# Patient Record
Sex: MALE | Race: BLACK OR AFRICAN AMERICAN | NOT HISPANIC OR LATINO | Employment: OTHER | URBAN - METROPOLITAN AREA
[De-identification: names, ages, dates, MRNs, and addresses within clinical notes are randomized per-mention and may not be internally consistent; named-entity substitution may affect disease eponyms.]

---

## 2015-01-15 LAB — HIV1/HIV2 ANTIBODY: NON REACTIVE

## 2017-03-21 ENCOUNTER — LAB VISIT (OUTPATIENT)
Dept: LAB | Facility: HOSPITAL | Age: 62
End: 2017-03-21
Attending: INTERNAL MEDICINE
Payer: COMMERCIAL

## 2017-03-21 ENCOUNTER — OFFICE VISIT (OUTPATIENT)
Dept: FAMILY MEDICINE | Facility: CLINIC | Age: 62
End: 2017-03-21
Payer: COMMERCIAL

## 2017-03-21 VITALS
SYSTOLIC BLOOD PRESSURE: 100 MMHG | TEMPERATURE: 98 F | BODY MASS INDEX: 29.55 KG/M2 | HEART RATE: 76 BPM | DIASTOLIC BLOOD PRESSURE: 80 MMHG | OXYGEN SATURATION: 99 % | WEIGHT: 195 LBS | HEIGHT: 68 IN

## 2017-03-21 DIAGNOSIS — I69.30 HISTORY OF STROKE WITH RESIDUAL EFFECTS: ICD-10-CM

## 2017-03-21 DIAGNOSIS — R71.8 MICROCYTOSIS: ICD-10-CM

## 2017-03-21 DIAGNOSIS — R60.0 PERIPHERAL EDEMA: ICD-10-CM

## 2017-03-21 DIAGNOSIS — R35.89 POLYURIA: ICD-10-CM

## 2017-03-21 DIAGNOSIS — Z86.73 HISTORY OF STROKE: Primary | ICD-10-CM

## 2017-03-21 DIAGNOSIS — Z79.4 CONTROLLED TYPE 2 DIABETES MELLITUS WITHOUT COMPLICATION, WITH LONG-TERM CURRENT USE OF INSULIN: ICD-10-CM

## 2017-03-21 DIAGNOSIS — I69.398 SEIZURE DISORDER AS SEQUELA OF CEREBROVASCULAR ACCIDENT: ICD-10-CM

## 2017-03-21 DIAGNOSIS — Z51.81 THERAPEUTIC DRUG MONITORING: ICD-10-CM

## 2017-03-21 DIAGNOSIS — N40.0 BENIGN NON-NODULAR PROSTATIC HYPERPLASIA WITHOUT LOWER URINARY TRACT SYMPTOMS: ICD-10-CM

## 2017-03-21 DIAGNOSIS — E78.5 HYPERLIPIDEMIA, UNSPECIFIED HYPERLIPIDEMIA TYPE: ICD-10-CM

## 2017-03-21 DIAGNOSIS — N18.30 CKD STAGE 3 SECONDARY TO DIABETES: ICD-10-CM

## 2017-03-21 DIAGNOSIS — I10 ESSENTIAL HYPERTENSION: ICD-10-CM

## 2017-03-21 DIAGNOSIS — E11.9 CONTROLLED TYPE 2 DIABETES MELLITUS WITHOUT COMPLICATION, WITH LONG-TERM CURRENT USE OF INSULIN: ICD-10-CM

## 2017-03-21 DIAGNOSIS — E11.22 CKD STAGE 3 SECONDARY TO DIABETES: ICD-10-CM

## 2017-03-21 DIAGNOSIS — G40.909 SEIZURE DISORDER AS SEQUELA OF CEREBROVASCULAR ACCIDENT: ICD-10-CM

## 2017-03-21 DIAGNOSIS — I69.351 HEMIPLEGIA AND HEMIPARESIS FOLLOWING CEREBRAL INFARCTION AFFECTING RIGHT DOMINANT SIDE: ICD-10-CM

## 2017-03-21 LAB
ALBUMIN SERPL BCP-MCNC: 3.6 G/DL
ALP SERPL-CCNC: 74 U/L
ALT SERPL W/O P-5'-P-CCNC: 17 U/L
ANION GAP SERPL CALC-SCNC: 10 MMOL/L
AST SERPL-CCNC: 17 U/L
BASOPHILS # BLD AUTO: 0.02 K/UL
BASOPHILS NFR BLD: 0.5 %
BILIRUB SERPL-MCNC: 0.4 MG/DL
BUN SERPL-MCNC: 32 MG/DL
CALCIUM SERPL-MCNC: 8.8 MG/DL
CHLORIDE SERPL-SCNC: 106 MMOL/L
CO2 SERPL-SCNC: 23 MMOL/L
CREAT SERPL-MCNC: 1.6 MG/DL
DIFFERENTIAL METHOD: ABNORMAL
EOSINOPHIL # BLD AUTO: 0 K/UL
EOSINOPHIL NFR BLD: 1.1 %
ERYTHROCYTE [DISTWIDTH] IN BLOOD BY AUTOMATED COUNT: 13.7 %
EST. GFR  (AFRICAN AMERICAN): 52.6 ML/MIN/1.73 M^2
EST. GFR  (NON AFRICAN AMERICAN): 45.5 ML/MIN/1.73 M^2
GLUCOSE SERPL-MCNC: 84 MG/DL
HCT VFR BLD AUTO: 39.9 %
HGB BLD-MCNC: 14 G/DL
LYMPHOCYTES # BLD AUTO: 1.1 K/UL
LYMPHOCYTES NFR BLD: 29.8 %
MCH RBC QN AUTO: 26.4 PG
MCHC RBC AUTO-ENTMCNC: 35.1 %
MCV RBC AUTO: 75 FL
MONOCYTES # BLD AUTO: 0.3 K/UL
MONOCYTES NFR BLD: 8.2 %
NEUTROPHILS # BLD AUTO: 2.3 K/UL
NEUTROPHILS NFR BLD: 60.4 %
PHENYTOIN SERPL-MCNC: 10 UG/ML
PLATELET # BLD AUTO: 204 K/UL
PMV BLD AUTO: 9.6 FL
POTASSIUM SERPL-SCNC: 4.7 MMOL/L
PROT SERPL-MCNC: 6.9 G/DL
RBC # BLD AUTO: 5.31 M/UL
SODIUM SERPL-SCNC: 139 MMOL/L
WBC # BLD AUTO: 3.79 K/UL

## 2017-03-21 PROCEDURE — 3074F SYST BP LT 130 MM HG: CPT | Mod: S$GLB,,, | Performed by: INTERNAL MEDICINE

## 2017-03-21 PROCEDURE — 36415 COLL VENOUS BLD VENIPUNCTURE: CPT | Mod: PO

## 2017-03-21 PROCEDURE — 80053 COMPREHEN METABOLIC PANEL: CPT

## 2017-03-21 PROCEDURE — 83036 HEMOGLOBIN GLYCOSYLATED A1C: CPT

## 2017-03-21 PROCEDURE — 3046F HEMOGLOBIN A1C LEVEL >9.0%: CPT | Mod: S$GLB,,, | Performed by: INTERNAL MEDICINE

## 2017-03-21 PROCEDURE — 85025 COMPLETE CBC W/AUTO DIFF WBC: CPT

## 2017-03-21 PROCEDURE — 80185 ASSAY OF PHENYTOIN TOTAL: CPT

## 2017-03-21 PROCEDURE — 1160F RVW MEDS BY RX/DR IN RCRD: CPT | Mod: S$GLB,,, | Performed by: INTERNAL MEDICINE

## 2017-03-21 PROCEDURE — 4010F ACE/ARB THERAPY RXD/TAKEN: CPT | Mod: S$GLB,,, | Performed by: INTERNAL MEDICINE

## 2017-03-21 PROCEDURE — 99204 OFFICE O/P NEW MOD 45 MIN: CPT | Mod: S$GLB,,, | Performed by: INTERNAL MEDICINE

## 2017-03-21 PROCEDURE — 3079F DIAST BP 80-89 MM HG: CPT | Mod: S$GLB,,, | Performed by: INTERNAL MEDICINE

## 2017-03-21 PROCEDURE — 99999 PR PBB SHADOW E&M-NEW PATIENT-LVL III: CPT | Mod: PBBFAC,,, | Performed by: INTERNAL MEDICINE

## 2017-03-21 PROCEDURE — 2022F DILAT RTA XM EVC RTNOPTHY: CPT | Mod: S$GLB,,, | Performed by: INTERNAL MEDICINE

## 2017-03-21 RX ORDER — PHENYTOIN SODIUM 100 MG/1
100 CAPSULE, EXTENDED RELEASE ORAL 3 TIMES DAILY
Qty: 90 CAPSULE | Refills: 5 | Status: SHIPPED | OUTPATIENT
Start: 2017-03-21 | End: 2017-10-10 | Stop reason: SDUPTHER

## 2017-03-21 RX ORDER — TAMSULOSIN HYDROCHLORIDE 0.4 MG/1
0.4 CAPSULE ORAL DAILY
COMMUNITY
End: 2017-03-21 | Stop reason: SDUPTHER

## 2017-03-21 RX ORDER — CLOPIDOGREL BISULFATE 75 MG/1
75 TABLET ORAL DAILY
COMMUNITY
End: 2017-03-21 | Stop reason: SDUPTHER

## 2017-03-21 RX ORDER — CLOPIDOGREL BISULFATE 75 MG/1
75 TABLET ORAL DAILY
Qty: 30 TABLET | Refills: 5 | Status: SHIPPED | OUTPATIENT
Start: 2017-03-21 | End: 2017-09-28 | Stop reason: SDUPTHER

## 2017-03-21 RX ORDER — FUROSEMIDE 40 MG/1
40 TABLET ORAL DAILY
COMMUNITY
End: 2017-03-21

## 2017-03-21 RX ORDER — BENAZEPRIL HYDROCHLORIDE 10 MG/1
10 TABLET ORAL DAILY
COMMUNITY
End: 2017-03-21 | Stop reason: SDUPTHER

## 2017-03-21 RX ORDER — BENAZEPRIL HYDROCHLORIDE 10 MG/1
10 TABLET ORAL DAILY
Qty: 30 TABLET | Refills: 5 | Status: SHIPPED | OUTPATIENT
Start: 2017-03-21 | End: 2017-05-30 | Stop reason: SDUPTHER

## 2017-03-21 RX ORDER — FUROSEMIDE 20 MG/1
20 TABLET ORAL DAILY
Qty: 30 TABLET | Refills: 5 | Status: SHIPPED | OUTPATIENT
Start: 2017-03-21 | End: 2018-05-11 | Stop reason: SDUPTHER

## 2017-03-21 RX ORDER — BACLOFEN 10 MG/1
10 TABLET ORAL 4 TIMES DAILY
COMMUNITY
End: 2017-03-21 | Stop reason: SDUPTHER

## 2017-03-21 RX ORDER — PHENYTOIN SODIUM 100 MG/1
100 CAPSULE, EXTENDED RELEASE ORAL 3 TIMES DAILY
COMMUNITY
End: 2017-03-21 | Stop reason: SDUPTHER

## 2017-03-21 RX ORDER — TAMSULOSIN HYDROCHLORIDE 0.4 MG/1
0.4 CAPSULE ORAL DAILY
Qty: 30 CAPSULE | Refills: 5 | Status: SHIPPED | OUTPATIENT
Start: 2017-03-21 | End: 2017-05-08 | Stop reason: SDUPTHER

## 2017-03-21 RX ORDER — FUROSEMIDE 20 MG/1
20 TABLET ORAL DAILY
COMMUNITY
End: 2017-03-21 | Stop reason: SDUPTHER

## 2017-03-21 RX ORDER — ATORVASTATIN CALCIUM 80 MG/1
80 TABLET, FILM COATED ORAL DAILY
Qty: 30 TABLET | Refills: 5 | Status: SHIPPED | OUTPATIENT
Start: 2017-03-21 | End: 2018-05-11 | Stop reason: SDUPTHER

## 2017-03-21 RX ORDER — BACLOFEN 10 MG/1
10 TABLET ORAL 4 TIMES DAILY
Qty: 120 TABLET | Refills: 5 | Status: SHIPPED | OUTPATIENT
Start: 2017-03-21 | End: 2017-12-06 | Stop reason: SDUPTHER

## 2017-03-21 RX ORDER — INSULIN GLARGINE 100 [IU]/ML
INJECTION, SOLUTION SUBCUTANEOUS NIGHTLY
COMMUNITY
End: 2017-08-02

## 2017-03-21 NOTE — PROGRESS NOTES
Assessment & Plan    History of stroke with residual effects - R sided hemiplegia.  Refilled baclofen.  ASA and plavix. Referral to neurology. Needs statin - start atorva 80.  On ACEI  History of stroke   -     Ambulatory referral to Neurology  -     baclofen (LIORESAL) 10 MG tablet; Take 1 tablet (10 mg total) by mouth 4 (four) times daily.  Dispense: 120 tablet; Refill: 5  -     clopidogrel (PLAVIX) 75 mg tablet; Take 1 tablet (75 mg total) by mouth once daily.  Dispense: 30 tablet; Refill: 5    Essential hypertension - stable, benazepril 10  -     benazepril (LOTENSIN) 10 MG tablet; Take 1 tablet (10 mg total) by mouth once daily.  Dispense: 30 tablet; Refill: 5    Hyperlipidemia, unspecified hyperlipidemia type - with hx of CVA - needs secondary prevention - start high dose statin - lipitor 80.  -     atorvastatin (LIPITOR) 80 MG tablet; Take 1 tablet (80 mg total) by mouth once daily.  Dispense: 30 tablet; Refill: 5    Benign non-nodular prostatic hyperplasia without lower urinary tract symptoms - check UA.  On flomax.    Seizure disorder as sequela of cerebrovascular accident - check dilantin level.  Referral to neuro.  No seizures since by self-report.  -     Ambulatory referral to Neurology  -     phenytoin (DILANTIN) 100 MG ER capsule; Take 1 capsule (100 mg total) by mouth 3 (three) times daily.  Dispense: 90 capsule; Refill: 5    Hemiplegia and hemiparesis following cerebral infarction affecting right dominant side  -     Ambulatory referral to Neurology  -     baclofen (LIORESAL) 10 MG tablet; Take 1 tablet (10 mg total) by mouth 4 (four) times daily.  Dispense: 120 tablet; Refill: 5    Therapeutic drug monitoring - check CBC, CMP, dilantin level.  -     Comprehensive metabolic panel; Future; Expected date: 3/21/17  -     CBC auto differential; Future; Expected date: 3/21/17  -     Phenytoin level, total; Future; Expected date: 3/21/17    Polyuria - r/o uncontrolled DM; check UA for infection?  -      "Urinalysis; Future; Expected date: 3/21/17  -     tamsulosin (FLOMAX) 0.4 mg Cp24; Take 1 capsule (0.4 mg total) by mouth once daily.  Dispense: 30 capsule; Refill: 5    Peripheral edema - lasix 20 works; not using lasix 40; refilled lasix.  -     furosemide (LASIX) 20 MG tablet; Take 1 tablet (20 mg total) by mouth once daily.  Dispense: 30 tablet; Refill: 5    Controlled type 2 diabetes mellitus without complication, with long-term current use of insulin - unsure last labs, so check A1c today.  Off lantus, just using humalog 7 units with meals.  -     Hemoglobin A1c; Future; Expected date: 3/21/17    Reports he was followed by cardiology - unclear reason - for CHF? (pedal edema); HTN and lipid? Old records from Dr. Bowman.  On ACEI, statin, plavix, ASA      Medications Discontinued During This Encounter   Medication Reason    baclofen (LIORESAL) 10 MG tablet Reorder    benazepril (LOTENSIN) 10 MG tablet Reorder    clopidogrel (PLAVIX) 75 mg tablet Reorder    phenytoin (DILANTIN) 100 MG ER capsule Reorder    tamsulosin (FLOMAX) 0.4 mg Cp24 Reorder    furosemide (LASIX) 20 MG tablet Reorder    furosemide (LASIX) 40 MG tablet        Follow-up: Return in about 3 months (around 6/21/2017) for follow up chronic medical problem, non-fasting.      =================================================================      Chief Complaint   Patient presents with    [facility]       HPI  Miguel is a 61 y.o. male, last appointment with this clinic was Visit date not found.    Here with significant other.  History mainly from the patient.    Was seeing Freddy Mcneil until insurance change. Also Demetrius Bowman (cardiology); it's unclear specifically the reason he sees cardiology.    Hx of CVA with R sided deficits, 1/2015, [facility].  Old records reviewed:    "CT head neg for hemorrhage.  No TPA.  A1c at that time 11.   at presentation.  Carotid Ultrasound showed no hemodynamic significant " "stenosis.  TTE showed EF > 55%, normal diastolic filling, and negative bubble study.  MRI Brain:Focal early subacute infarct along the deep cerebral white matter in the left periventricular corona radiata without evidence of acute bleed. Moderate dilation of the lateral ventricles without focal abnormal signal abnormality, this may represent chronic diffuse cerebral white matter atrophy, normal-pressure hydrocephalus is not excluded.  MRA Brain: Mild to moderate (50-60% narrowing) of the M1 segment of the left MCA.   CTA of head and neck: No hemodynamically significant lesions are observed vessels of the neck. There is moderate stenoses in the cavernous segment of the left internal carotid artery, the M1 segment of the left middle cerebral artery, and M2 segment of the right middle cerebral artery. No major intracranial vessel occlusion is identified.    Consulted Neurosurgery for opinion on the stenoses noted on imaging. Recommended maximal medical management and possible candidate for intracranial stenting. Pt was transferred to [facility] to undergo cerebral angiogram and angioplasty with possible stenting."    HTN - was started on lisinopril and amlodipine at that admission.    Lipid - was started on crestor 40 at that admission.  DM - discharged on NPH  Pt notes he had subsequent stenting.  Residual R sided deficits.  Able to perform his own ADLs.   Peripheral edema.  Takes a fluid pill for this - furosemide. Was given rx for 20 mg and for 40 mg and told to take the 40 if the 20 didn't work.  The 20 mg works so he takes that one not the 40 mg.  Post -CVA seizure.  Taking dilantin - pt thinks he did have a seizure during rehabilitation in the subacute period.  Taking regularly, TID.    DM - Currently taking 7 units of humalog with meals.  Is supposed to be taking lantus 15 units at night.  But has not been taking; does not feel he needs it.  Home readings - 120s, 130s. Off lantus x 2 months.  Does not " recall hx of hypoglycemia.  Not currently on statin.  At least, brought in his meds, statin not included.  He notes polyuria.  No pain no burning.    Patient Care Team:  Raciel Raymond MD as PCP - General (Internal Medicine)    Patient Active Problem List    Diagnosis Date Noted    Essential hypertension 03/21/2017    Hyperlipidemia 03/21/2017    Controlled type 2 diabetes mellitus, with long-term current use of insulin 03/21/2017    Benign non-nodular prostatic hyperplasia without lower urinary tract symptoms 03/21/2017    Seizure disorder as sequela of cerebrovascular accident 03/21/2017    Cerebral infarction due to thrombosis of cerebral artery 01/13/2015       PAST MEDICAL HISTORY:  Past Medical History:   Diagnosis Date    Diabetes mellitus, type 2     Hypertension     Stroke        PAST SURGICAL HISTORY:  No past surgical history on file.    Family History   Problem Relation Age of Onset    Diabetes Mother     Heart disease Mother     Hypertension Mother     Hypertension Sister     No Known Problems Brother     No Known Problems Daughter      Family History   Problem Relation Age of Onset    Diabetes Mother     Heart disease Mother     Hypertension Mother     Hypertension Sister     No Known Problems Brother     No Known Problems Daughter          SOCIAL HISTORY:  Social History     Social History    Marital status: Single     Spouse name: N/A    Number of children: N/A    Years of education: N/A     Occupational History    disabled - former  - dozers, etc      Social History Main Topics    Smoking status: Never Smoker    Smokeless tobacco: Not on file    Alcohol use Not on file    Drug use: Not on file    Sexual activity: Not on file     Other Topics Concern    Not on file     Social History Narrative    No narrative on file       ALLERGIES AND MEDICATIONS: updated and reviewed.  Review of patient's allergies indicates:   Allergen Reactions    Penicillins  "Hives     Current Outpatient Prescriptions   Medication Sig Dispense Refill    baclofen (LIORESAL) 10 MG tablet Take 10 mg by mouth 4 (four) times daily.      benazepril (LOTENSIN) 10 MG tablet Take 10 mg by mouth once daily.      clopidogrel (PLAVIX) 75 mg tablet Take 75 mg by mouth once daily.      furosemide (LASIX) 20 MG tablet Take 20 mg by mouth once daily.      furosemide (LASIX) 40 MG tablet Take 40 mg by mouth once daily.      insulin glargine (LANTUS) 100 unit/mL injection Inject into the skin every evening.      phenytoin (DILANTIN) 100 MG ER capsule Take 100 mg by mouth 3 (three) times daily.      tamsulosin (FLOMAX) 0.4 mg Cp24 Take 0.4 mg by mouth once daily.       No current facility-administered medications for this visit.      Not taking lasix 40.  Not taking lantus    Review of Systems   Constitutional: Negative for fever, malaise/fatigue and weight loss.   HENT: Negative for congestion.    Eyes: Negative for blurred vision and pain.   Respiratory: Negative for shortness of breath and wheezing.    Cardiovascular: Positive for leg swelling. Negative for chest pain and palpitations.   Gastrointestinal: Negative for abdominal pain, blood in stool, constipation, diarrhea and heartburn.   Genitourinary: Positive for frequency. Negative for dysuria, hematuria and urgency.   Musculoskeletal: Negative for joint pain.   Neurological: Positive for focal weakness. Negative for tingling, weakness and headaches.        Chronic R hemiplegia   Psychiatric/Behavioral: Negative for depression. The patient is not nervous/anxious.        Physical Exam   Vitals:    03/21/17 1021 03/21/17 1100   BP: (!) 140/82 100/80   BP Location:  Left arm   Patient Position:  Standing   Pulse: 76    Temp: 98.2 °F (36.8 °C)    TempSrc: Oral    SpO2: 99%    Weight: 88.5 kg (195 lb)    Height: 5' 8" (1.727 m)     Body mass index is 29.65 kg/(m^2).  Weight: 88.5 kg (195 lb)   Height: 5' 8" (172.7 cm)     Physical Exam "   Constitutional: He is oriented to person, place, and time. He appears well-developed and well-nourished. No distress.   Eyes: EOM are normal.   Cardiovascular: Normal rate, regular rhythm and normal heart sounds.    No murmur heard.  Pulmonary/Chest: Effort normal and breath sounds normal.   Musculoskeletal: Normal range of motion. He exhibits edema.   Right-sided hemiplegia.  Ambulating with walker.  Right-sided facial droop  2 edema to knees bilaterally, R > L   Neurological: He is alert and oriented to person, place, and time. A cranial nerve deficit is present. He exhibits abnormal muscle tone. Coordination abnormal.   Skin: Skin is warm and dry.   Psychiatric: He has a normal mood and affect. His behavior is normal. Thought content normal.

## 2017-03-22 PROBLEM — N40.0 BENIGN NON-NODULAR PROSTATIC HYPERPLASIA WITHOUT LOWER URINARY TRACT SYMPTOMS: Chronic | Status: ACTIVE | Noted: 2017-03-21

## 2017-03-22 PROBLEM — N18.30 CONTROLLED TYPE 2 DIABETES MELLITUS WITH STAGE 3 CHRONIC KIDNEY DISEASE, WITH LONG-TERM CURRENT USE OF INSULIN: Status: ACTIVE | Noted: 2017-03-21

## 2017-03-22 PROBLEM — E11.22 CKD STAGE 3 SECONDARY TO DIABETES: Status: ACTIVE | Noted: 2017-03-22

## 2017-03-22 PROBLEM — I69.351 HEMIPLEGIA AND HEMIPARESIS FOLLOWING CEREBRAL INFARCTION AFFECTING RIGHT DOMINANT SIDE: Chronic | Status: ACTIVE | Noted: 2017-03-21

## 2017-03-22 PROBLEM — E11.22 CONTROLLED TYPE 2 DIABETES MELLITUS WITH STAGE 3 CHRONIC KIDNEY DISEASE, WITH LONG-TERM CURRENT USE OF INSULIN: Status: ACTIVE | Noted: 2017-03-21

## 2017-03-22 PROBLEM — E11.22 CONTROLLED TYPE 2 DIABETES MELLITUS WITH STAGE 3 CHRONIC KIDNEY DISEASE, WITH LONG-TERM CURRENT USE OF INSULIN: Chronic | Status: ACTIVE | Noted: 2017-03-21

## 2017-03-22 PROBLEM — N18.30 CONTROLLED TYPE 2 DIABETES MELLITUS WITH STAGE 3 CHRONIC KIDNEY DISEASE, WITH LONG-TERM CURRENT USE OF INSULIN: Chronic | Status: ACTIVE | Noted: 2017-03-21

## 2017-03-22 PROBLEM — N18.30 CKD STAGE 3 SECONDARY TO DIABETES: Status: ACTIVE | Noted: 2017-03-22

## 2017-03-22 PROBLEM — R71.8 MICROCYTOSIS: Status: ACTIVE | Noted: 2017-03-22

## 2017-03-22 LAB
ESTIMATED AVG GLUCOSE: 134 MG/DL
HBA1C MFR BLD HPLC: 6.3 %

## 2017-03-23 NOTE — PROGRESS NOTES
CKD - seen on admission at [facility] at time of his stroke.  Microcytosis - also seen on previous labs.  Hb/Hct WNL.  Suspect hemoglobinopathy.  Dilantin level normal.  Results to pt.  Old records requested.  Plan for f/u 1 month.

## 2017-04-03 ENCOUNTER — OFFICE VISIT (OUTPATIENT)
Dept: PODIATRY | Facility: CLINIC | Age: 62
End: 2017-04-03
Payer: COMMERCIAL

## 2017-04-03 VITALS — HEIGHT: 68 IN | BODY MASS INDEX: 29.55 KG/M2 | WEIGHT: 195 LBS

## 2017-04-03 DIAGNOSIS — L84 CORN OR CALLUS: ICD-10-CM

## 2017-04-03 DIAGNOSIS — Z86.73 HISTORY OF STROKE: ICD-10-CM

## 2017-04-03 DIAGNOSIS — R53.1 RIGHT SIDED WEAKNESS: ICD-10-CM

## 2017-04-03 DIAGNOSIS — B35.1 ONYCHOMYCOSIS DUE TO DERMATOPHYTE: ICD-10-CM

## 2017-04-03 DIAGNOSIS — R60.0 BILATERAL LOWER EXTREMITY EDEMA: ICD-10-CM

## 2017-04-03 DIAGNOSIS — E11.49 TYPE II DIABETES MELLITUS WITH NEUROLOGICAL MANIFESTATIONS: Primary | ICD-10-CM

## 2017-04-03 PROCEDURE — 99999 PR PBB SHADOW E&M-EST. PATIENT-LVL II: CPT | Mod: PBBFAC,,, | Performed by: PODIATRIST

## 2017-04-03 PROCEDURE — 3044F HG A1C LEVEL LT 7.0%: CPT | Mod: S$GLB,,, | Performed by: PODIATRIST

## 2017-04-03 PROCEDURE — 11055 PARING/CUTG B9 HYPRKER LES 1: CPT | Mod: S$GLB,,, | Performed by: PODIATRIST

## 2017-04-03 PROCEDURE — 4010F ACE/ARB THERAPY RXD/TAKEN: CPT | Mod: S$GLB,,, | Performed by: PODIATRIST

## 2017-04-03 PROCEDURE — 1160F RVW MEDS BY RX/DR IN RCRD: CPT | Mod: S$GLB,,, | Performed by: PODIATRIST

## 2017-04-03 PROCEDURE — 99203 OFFICE O/P NEW LOW 30 MIN: CPT | Mod: 25,S$GLB,, | Performed by: PODIATRIST

## 2017-04-03 PROCEDURE — 11721 DEBRIDE NAIL 6 OR MORE: CPT | Mod: 59,S$GLB,, | Performed by: PODIATRIST

## 2017-04-03 NOTE — MR AVS SNAPSHOT
[facility]  [address]  [place] [address]  Phone: [phone]                  Miguel Moore   4/3/2017 3:45 PM   Office Visit    Description:  Male : 1955   Provider:  Gabriella Manjarrez DPM   Department:  [facility]           Reason for Visit     Diabetic Foot Exam     Diabetes Mellitus     Nail Care     Callouses           Diagnoses this Visit        Comments    Type II diabetes mellitus with neurological manifestations    -  Primary            To Do List           Future Appointments        Provider Department Dept Phone    2017 8:40 AM Geovany Rucker MD [facility] [phone]    7/3/2017 4:00 PM Gabriella Manjarrez DPM [facility] [phone]      Goals (5 Years of Data)     None      [facility]     [facility] On Call Nurse Care Line -  Assistance  Unless otherwise directed by your provider, please contact [facility] On-Call, our nurse care line that is available for  assistance.     Registered nurses in the [facility] On Call Center provide: appointment scheduling, clinical advisement, health education, and other advisory services.  Call: [phone] (toll free)               Medications                Verify that the below list of medications is an accurate representation of the medications you are currently taking.  If none reported, the list may be blank. If incorrect, please contact your healthcare provider. Carry this list with you in case of emergency.           Current Medications     atorvastatin (LIPITOR) 80 MG tablet Take 1 tablet (80 mg total) by mouth once daily.    baclofen (LIORESAL) 10 MG tablet Take 1 tablet (10 mg total) by mouth 4 (four) times daily.    benazepril (LOTENSIN) 10 MG tablet Take 1 tablet (10 mg total) by mouth once daily.    clopidogrel (PLAVIX) 75 mg tablet Take 1 tablet (75 mg total) by mouth once daily.    furosemide (LASIX) 20 MG tablet Take 1 tablet (20 mg total) by mouth once daily.    insulin glargine (LANTUS)  "100 unit/mL injection Inject into the skin every evening.    phenytoin (DILANTIN) 100 MG ER capsule Take 1 capsule (100 mg total) by mouth 3 (three) times daily.    tamsulosin (FLOMAX) 0.4 mg Cp24 Take 1 capsule (0.4 mg total) by mouth once daily.           Clinical Reference Information           Your Vitals Were     Height Weight BMI          5' 8" (1.727 m) 88.5 kg (195 lb) 29.65 kg/m2        Allergies as of 4/3/2017     Penicillins      Immunizations Administered on Date of Encounter - 4/3/2017     None      [facility] Sign-Up     Activating your [facility] account is as easy as 1-2-3!     1) Visit [email or web address], select Sign Up Now, enter this activation code and your date of birth, then select Next.  [email or web address]  Expires: 5/18/2017  4:01 PM      2) Create a username and password to use when you visit [facility] in the future and select a security question in case you lose your password and select Next.    3) Enter your e-mail address and click Sign Up!    Additional Information  If you have questions, please e-mail [email or web address] or call [phone] to talk to our [facility] staff. Remember, [facility] is NOT to be used for urgent needs. For medical emergencies, dial 911.         Language Assistance Services     ATTENTION: Language assistance services are available, free of charge. Please call [phone].      ATENCIÓN: Si habla bayleeañol, tiene a babin disposición servicios gratuitos de asistencia lingüística. Llame al [phone].     CHÚ Ý: N?u b?n nói Ti?ng Vi?t, có các d?ch v? h? tr? ngôn ng? mi?n phí dành cho b?n. G?i s? [phone].         [facility] complies with applicable Federal civil rights laws and does not discriminate on the basis of race, color, national origin, age, disability, or sex.        "

## 2017-04-03 NOTE — PROGRESS NOTES
Subjective:      Patient ID: Miguel Moore is a 61 y.o. male.    Chief Complaint: Diabetic Foot Exam (bilateral swelling Pcp Dr. Raymond  03/21/2017); Diabetes Mellitus; Nail Care; and Callouses    Miguel is a 61 y.o. male who presents to the clinic for evaluation and treatment of high risk feet. Miguel has a past medical history of Diabetes mellitus, type 2; Hypertension; and Stroke. The patient's chief complaint is long, thick toenails and uncomfortable callus at tip of left great toe under nail plate. Previously saw podiatrist at . Here to establish care. This patient has documented high risk feet requiring routine maintenance secondary to diabetes mellitis and those secondary complications of diabetes, as mentioned. no other major complaints today. Had stroke 2 years ago and has undergone extensive therapy and has improved with getting around greatly. Using cane today.     PCP: Raciel Raymond MD    Date Last Seen by PCP:   Chief Complaint   Patient presents with    Diabetic Foot Exam     bilateral swelling Pcp Dr. Raymond  03/21/2017    Diabetes Mellitus    Nail Care    Callouses         Current shoe gear:   Tennis shoes         Hemoglobin A1C   Date Value Ref Range Status   03/21/2017 6.3 (H) 4.5 - 6.2 % Final     Comment:     According to ADA guidelines, hemoglobin A1C <7.0% represents  optimal control in non-pregnant diabetic patients.  Different  metrics may apply to specific populations.   Standards of Medical Care in Diabetes - 2016.  For the purpose of screening for the presence of diabetes:  <5.7%     Consistent with the absence of diabetes  5.7-6.4%  Consistent with increasing risk for diabetes   (prediabetes)  >or=6.5%  Consistent with diabetes  Currently no consensus exists for use of hemoglobin A1C  for diagnosis of diabetes for children.       Past Medical History:   Diagnosis Date    Diabetes mellitus, type 2     Hypertension     Stroke        History reviewed. No pertinent surgical  history.    Family History   Problem Relation Age of Onset    Diabetes Mother     Heart disease Mother     Hypertension Mother     Hypertension Sister     No Known Problems Brother     No Known Problems Daughter        Social History     Social History    Marital status: Single     Spouse name: N/A    Number of children: N/A    Years of education: N/A     Occupational History    disabled - former  - dozers, etc      Social History Main Topics    Smoking status: Never Smoker    Smokeless tobacco: None    Alcohol use None    Drug use: None    Sexual activity: Not Asked     Other Topics Concern    None     Social History Narrative       Current Outpatient Prescriptions   Medication Sig Dispense Refill    atorvastatin (LIPITOR) 80 MG tablet Take 1 tablet (80 mg total) by mouth once daily. 30 tablet 5    baclofen (LIORESAL) 10 MG tablet Take 1 tablet (10 mg total) by mouth 4 (four) times daily. 120 tablet 5    benazepril (LOTENSIN) 10 MG tablet Take 1 tablet (10 mg total) by mouth once daily. 30 tablet 5    clopidogrel (PLAVIX) 75 mg tablet Take 1 tablet (75 mg total) by mouth once daily. 30 tablet 5    furosemide (LASIX) 20 MG tablet Take 1 tablet (20 mg total) by mouth once daily. 30 tablet 5    insulin glargine (LANTUS) 100 unit/mL injection Inject into the skin every evening.      phenytoin (DILANTIN) 100 MG ER capsule Take 1 capsule (100 mg total) by mouth 3 (three) times daily. 90 capsule 5    tamsulosin (FLOMAX) 0.4 mg Cp24 Take 1 capsule (0.4 mg total) by mouth once daily. 30 capsule 5     No current facility-administered medications for this visit.        Review of patient's allergies indicates:   Allergen Reactions    Penicillins Hives       Review of Systems   Constitution: Negative for chills and fever.   Cardiovascular: Positive for leg swelling. Negative for chest pain and claudication.   Respiratory: Negative for cough and shortness of breath.    Skin: Positive for  dry skin and nail changes.   Musculoskeletal: Positive for muscle weakness.   Gastrointestinal: Negative for nausea and vomiting.   Neurological: Positive for focal weakness (right sided), numbness and sensory change. Negative for paresthesias.   Psychiatric/Behavioral: Negative for altered mental status.           Objective:      Physical Exam   Constitutional: He is oriented to person, place, and time. He appears well-developed and well-nourished.   HENT:   Head: Normocephalic.   Cardiovascular: Intact distal pulses.    Pulses:       Dorsalis pedis pulses are 2+ on the right side, and 2+ on the left side.        Posterior tibial pulses are 1+ on the right side, and 1+ on the left side.   CRT < 3 sec to tips of toes. 2+ pitting edema noted to b/l LE. No vericosities noted to b/l LEs.      Pulmonary/Chest: No respiratory distress.   Musculoskeletal:   Gastrocnemius equinus noted to b/l ankles with decreased DF noted on exam. MMT 5/5 in DF/PF/Inv/Ev resistance with no reproduction of pain in any direction Right, 3/5 left. Passive range of motion of ankle and pedal joints is painless. Adequate pedal joint ROM.      Neurological: He is alert and oriented to person, place, and time. He has normal strength. A sensory deficit is present.   Light touch, proprioception, and sharp/dull sensation are all intact bilaterally. Protective threshold with the Ephraim-Wienstein monofilament is intact bilaterally. Vibratory sensation diminished b/l distal foot.      Skin: Skin is warm, dry and intact. No erythema.   No open lesions, lacerations or wounds noted. Nails are thickened, elongated, discolored yellow/brown with subungual debris and brittleness to R 1-5 and L 1-5. Interdigital spaces clean, dry and intact b/l. No erythema noted to b/l foot. Skin texture thin, shiny, atrophic. Pedal hair absent. Toes cool to touch b/l.     Hyperkeratotic lesion noted to distal tip of left hallux just below elongated nail. Stable lesion without  signs of open wounds, however pre-ulcerative.      Psychiatric: He has a normal mood and affect. His behavior is normal. Judgment and thought content normal.   Vitals reviewed.            Assessment:       Encounter Diagnoses   Name Primary?    Type II diabetes mellitus with neurological manifestations Yes    History of stroke     Right sided weakness     Onychomycosis due to dermatophyte     Corn or callus     Bilateral lower extremity edema          Plan:       Miguel was seen today for diabetic foot exam, diabetes mellitus, nail care and callouses.    Diagnoses and all orders for this visit:    Type II diabetes mellitus with neurological manifestations    History of stroke    Right sided weakness    Onychomycosis due to dermatophyte    Corn or callus    Bilateral lower extremity edema      I counseled the patient on his conditions, their implications and medical management.        - Shoe inspection. Diabetic Foot Education. Patient reminded of the importance of good nutrition and blood sugar control to help prevent podiatric complications of diabetes. Patient instructed on proper foot hygeine. We discussed wearing proper shoe gear, daily foot inspections, never walking without protective shoe gear, never putting sharp instruments to feet, routine podiatric nail visits every 2-3 months.      - With patient's permission, nails were aggressively reduced and debrided x 10 to their soft tissue attachment mechanically and with electric , removing all offending nail and debris. Patient relates relief following the procedure. He will continue to monitor the areas daily, inspect his feet, wear protective shoe gear when ambulatory, moisturizer to maintain skin integrity and follow in this office in approximately 2-3 months, sooner p.r.n.    - Discussed regular and routine moisturizer to skin of both feet to help improve dry skin. Advised to apply twice daily until resolution of symptoms. Avoid between toes.      - Discussed the use of compression stockings b/l to help control edema. Tubigrip applied today. Discussed proper and consistent elevation of lower extremities, above the level of the heart, while at rest, to help control/improve edema.     RTC 3 months, sooner PRN

## 2017-04-10 ENCOUNTER — NURSE TRIAGE (OUTPATIENT)
Dept: ADMINISTRATIVE | Facility: CLINIC | Age: 62
End: 2017-04-10

## 2017-04-10 DIAGNOSIS — E11.22 CONTROLLED TYPE 2 DIABETES MELLITUS WITH STAGE 3 CHRONIC KIDNEY DISEASE, WITHOUT LONG-TERM CURRENT USE OF INSULIN: Primary | ICD-10-CM

## 2017-04-10 DIAGNOSIS — N18.30 CONTROLLED TYPE 2 DIABETES MELLITUS WITH STAGE 3 CHRONIC KIDNEY DISEASE, WITHOUT LONG-TERM CURRENT USE OF INSULIN: Primary | ICD-10-CM

## 2017-04-10 RX ORDER — INSULIN ASPART 100 [IU]/ML
7 INJECTION, SOLUTION INTRAVENOUS; SUBCUTANEOUS
Qty: 5 BOX | Refills: 3 | Status: SHIPPED | OUTPATIENT
Start: 2017-04-10 | End: 2018-05-11 | Stop reason: SDUPTHER

## 2017-04-10 NOTE — TELEPHONE ENCOUNTER
Spoke w/[facility] pharmacist, requesting Humalog flexpen insulin be changed to Novalog flexpen. States insurance will not cover Humalog. Patient states he injects 7units 3x/day.

## 2017-04-10 NOTE — TELEPHONE ENCOUNTER
----- Message from Laney Hayes sent at 4/10/2017 10:41 AM CDT -----  Contact: self  Pt calling to request a refill of insulin glargine (LANTUS) 100 unit/mL injection. Please call pt to verify medication first. Pt would like request sent to [facility]. Please call [phone].

## 2017-04-11 NOTE — TELEPHONE ENCOUNTER
Reason for Disposition   Caller has NON-URGENT medication question about med that PCP prescribed and triager unable to answer question    Protocols used: ST MEDICATION QUESTION CALL-A-AH    Miguel called to say he has been on Humalog insulin for some time.  He recently began coming to [facility], and he said Raciel Raymond MD ordered Novolog instead of Humalog.  He said he called the pharmacist, who told him both are basically the same. He would like verification of that information, and he wants to know why the change was made. Of note, he has not gone to pick it up from pharmacy yet, as he still has some Humalog left, and does not want to switch to the Novolog until he understands why the change was made.  Assured him I will message Dr Raymond, with request that a call be made to him with reason for change, and should he expect any difference in the response to this new medication.  Message to Raciel Raymond MD .  Please contact caller directly with any additional care advice.

## 2017-05-08 ENCOUNTER — OFFICE VISIT (OUTPATIENT)
Dept: FAMILY MEDICINE | Facility: CLINIC | Age: 62
End: 2017-05-08
Payer: COMMERCIAL

## 2017-05-08 VITALS
SYSTOLIC BLOOD PRESSURE: 120 MMHG | TEMPERATURE: 98 F | HEIGHT: 68 IN | HEART RATE: 94 BPM | BODY MASS INDEX: 29.3 KG/M2 | OXYGEN SATURATION: 98 % | WEIGHT: 193.31 LBS | DIASTOLIC BLOOD PRESSURE: 78 MMHG

## 2017-05-08 DIAGNOSIS — N18.30 CONTROLLED TYPE 2 DIABETES MELLITUS WITH STAGE 3 CHRONIC KIDNEY DISEASE, WITH LONG-TERM CURRENT USE OF INSULIN: Chronic | ICD-10-CM

## 2017-05-08 DIAGNOSIS — R35.89 POLYURIA: ICD-10-CM

## 2017-05-08 DIAGNOSIS — Z23 NEED FOR VACCINATION FOR STREP PNEUMONIAE: ICD-10-CM

## 2017-05-08 DIAGNOSIS — Z23 NEED FOR DIPHTHERIA-TETANUS-PERTUSSIS (TDAP) VACCINE, ADULT/ADOLESCENT: ICD-10-CM

## 2017-05-08 DIAGNOSIS — I69.30 HISTORY OF STROKE WITH RESIDUAL EFFECTS: Chronic | ICD-10-CM

## 2017-05-08 DIAGNOSIS — E11.22 CONTROLLED TYPE 2 DIABETES MELLITUS WITH STAGE 3 CHRONIC KIDNEY DISEASE, WITH LONG-TERM CURRENT USE OF INSULIN: Chronic | ICD-10-CM

## 2017-05-08 DIAGNOSIS — E78.5 HYPERLIPIDEMIA, UNSPECIFIED HYPERLIPIDEMIA TYPE: Chronic | ICD-10-CM

## 2017-05-08 DIAGNOSIS — Z12.11 SCREENING FOR COLON CANCER: ICD-10-CM

## 2017-05-08 DIAGNOSIS — Z79.4 CONTROLLED TYPE 2 DIABETES MELLITUS WITH STAGE 3 CHRONIC KIDNEY DISEASE, WITH LONG-TERM CURRENT USE OF INSULIN: Chronic | ICD-10-CM

## 2017-05-08 DIAGNOSIS — N18.30 CKD STAGE 3 SECONDARY TO DIABETES: ICD-10-CM

## 2017-05-08 DIAGNOSIS — N13.9 URINARY OBSTRUCTION: Primary | ICD-10-CM

## 2017-05-08 DIAGNOSIS — E11.22 CKD STAGE 3 SECONDARY TO DIABETES: ICD-10-CM

## 2017-05-08 PROCEDURE — 3044F HG A1C LEVEL LT 7.0%: CPT | Mod: S$GLB,,, | Performed by: INTERNAL MEDICINE

## 2017-05-08 PROCEDURE — 99214 OFFICE O/P EST MOD 30 MIN: CPT | Mod: 25,S$GLB,, | Performed by: INTERNAL MEDICINE

## 2017-05-08 PROCEDURE — 90715 TDAP VACCINE 7 YRS/> IM: CPT | Mod: S$GLB,,, | Performed by: INTERNAL MEDICINE

## 2017-05-08 PROCEDURE — 90472 IMMUNIZATION ADMIN EACH ADD: CPT | Mod: S$GLB,,, | Performed by: INTERNAL MEDICINE

## 2017-05-08 PROCEDURE — 99999 PR PBB SHADOW E&M-EST. PATIENT-LVL V: CPT | Mod: PBBFAC,,, | Performed by: INTERNAL MEDICINE

## 2017-05-08 PROCEDURE — 3078F DIAST BP <80 MM HG: CPT | Mod: S$GLB,,, | Performed by: INTERNAL MEDICINE

## 2017-05-08 PROCEDURE — 3074F SYST BP LT 130 MM HG: CPT | Mod: S$GLB,,, | Performed by: INTERNAL MEDICINE

## 2017-05-08 PROCEDURE — 90471 IMMUNIZATION ADMIN: CPT | Mod: S$GLB,,, | Performed by: INTERNAL MEDICINE

## 2017-05-08 PROCEDURE — 1160F RVW MEDS BY RX/DR IN RCRD: CPT | Mod: S$GLB,,, | Performed by: INTERNAL MEDICINE

## 2017-05-08 PROCEDURE — 4010F ACE/ARB THERAPY RXD/TAKEN: CPT | Mod: S$GLB,,, | Performed by: INTERNAL MEDICINE

## 2017-05-08 PROCEDURE — 90732 PPSV23 VACC 2 YRS+ SUBQ/IM: CPT | Mod: S$GLB,,, | Performed by: INTERNAL MEDICINE

## 2017-05-08 RX ORDER — TAMSULOSIN HYDROCHLORIDE 0.4 MG/1
0.8 CAPSULE ORAL DAILY
Qty: 60 CAPSULE | Refills: 5 | Status: SHIPPED | OUTPATIENT
Start: 2017-05-08 | End: 2017-11-08 | Stop reason: SDUPTHER

## 2017-05-08 NOTE — PROGRESS NOTES
Assessment & Plan    Polyuria  Urinary obstruction-urgent referral for urology for evaluation.  Has had the Nunez catheter in since this past Friday.  Increase the Flomax from 0.4 mg to 0.8 mg in the interim.  -     Cancel: Ambulatory referral to Urology  -     Ambulatory referral to Urology  -     tamsulosin (FLOMAX) 0.4 mg Cp24; Take 2 capsules (0.8 mg total) by mouth once daily. Increased dose.  Dispense: 60 capsule; Refill: 5    Need for vaccination for Strep pneumoniae  -     Pneumococcal Polysaccharide Vaccine (23 Valent) (SQ/IM)    Need for diphtheria-tetanus-pertussis (Tdap) vaccine, adult/adolescent  -     Tdap Vaccine    Controlled type 2 diabetes mellitus with stage 3 chronic kidney disease, with long-term current use of insulin-A1c very good.  Denies episodes of hypoglycemia.  For now no change.  Diabetic eye camera ordered today.  -     Diabetic Eye Screening Photo; Future    Screening for colon cancer-referral for colonoscopy ordered  -     Case request GI: COLONOSCOPY, ESOPHAGOGASTRODUODENOSCOPY (EGD)    CKD stage 3 secondary to diabetes-we talked about avoiding NSAIDs, which she doesn't use any way, and limiting protein intake to avoid increased kidney load.    History of stroke with residual effects-stable.    Hyperlipidemia, unspecified hyperlipidemia type-started on statin, has not quite been 3 months yet, no change    Other orders  -     Cancel: Hepatitis C antibody; Future; Expected date: 5/8/17  -     Cancel: Lipid panel; Future; Expected date: 5/8/17  -     Cancel: Case request GI: COLONOSCOPY        There are no discontinued medications.    Follow-up: No Follow-up on file. follow-up 3 months, at that visit, check lipid profile on new start statin, check hep C screening at that time as well, check A1c follow-up      =================================================================      Chief Complaint   Patient presents with    Urinary Retention     [facility] follow up from friday  for urinary retention       HPI  Miguel is a 61 y.o. male, last appointment with this clinic was 3/21/2017.    DM with neuropathy - diminished vibriception; on novolog only, had stopped lantus previously; a1c 6.3.  Hx of CVA with R sided hemiplegia  Seizure disorder after CVA, dilantin  Hyperlipidemia started on atorvastatin last OV  HTN benazepril  CKD  Peripheral edema, lasix  Microcytosis, suspect hemoglobinopathy  BPH on flomax  Needs fasting labs (lipid), HCV    Still have not received old records.    Had to go to the ER for urinary retention and had a Sinclair placed.  Needs to see urology to have it removed.  Had similar episode a few years ago related to prostate swelling and had to have sinclair at that time.  Was told that he couldn't leave it in more than a week.  Went to [facility] ER.  Given rx for tamsulosin.  But he's already taking.  0.4 mg.      Patient Care Team:  Raciel Raymond MD as PCP - General (Internal Medicine)    Patient Active Problem List    Diagnosis Date Noted    Microcytosis 03/22/2017     Seen on admission as well 2015; normal Hb, low MCV.  Normal RDW      CKD stage 3 secondary to diabetes 03/22/2017    Essential hypertension 03/21/2017    Hyperlipidemia 03/21/2017    Controlled type 2 diabetes mellitus with stage 3 chronic kidney disease, with long-term current use of insulin 03/21/2017    Benign non-nodular prostatic hyperplasia without lower urinary tract symptoms 03/21/2017    Seizure disorder as sequela of cerebrovascular accident 03/21/2017    Hemiplegia and hemiparesis following cerebral infarction affecting right dominant side 03/21/2017    History of stroke with residual effects 01/13/2015 1/2015 Carotid Ultrasound showed no hemodynamic significant stenosis.  1/2015: TTE showed EF > 55%, normal diastolic filling, and negative bubble study.  1/2015: MRI Brain:Focal early subacute infarct along the deep cerebral white matter in the left periventricular corona radiata  without evidence of acute bleed. Moderate dilation of the lateral ventricles without focal abnormal signal abnormality, this may represent chronic diffuse cerebral white matter atrophy, normal-pressure hydrocephalus is not excluded.  1/2015: MRA Brain: Mild to moderate (50-60% narrowing) of the M1 segment of the left MCA.   1/2015: CTA of head and neck: No hemodynamically significant lesions are observed vessels of the neck. There is moderate stenoses in the cavernous segment of the left internal carotid artery, the M1 segment of the left middle cerebral artery, and M2 segment of the right middle cerebral artery. No major intracranial vessel occlusion is identified.  S/p stenting of the left ICA by patient report         PAST MEDICAL HISTORY:  Past Medical History:   Diagnosis Date    Diabetes mellitus, type 2     Hypertension     Stroke        PAST SURGICAL HISTORY:  History reviewed. No pertinent surgical history.    SOCIAL HISTORY:  Social History     Social History    Marital status: Single     Spouse name: N/A    Number of children: N/A    Years of education: N/A     Occupational History    disabled - former  - dozers, etc      Social History Main Topics    Smoking status: Never Smoker    Smokeless tobacco: Not on file    Alcohol use Not on file    Drug use: Not on file    Sexual activity: Not on file     Other Topics Concern    Not on file     Social History Narrative       ALLERGIES AND MEDICATIONS: updated and reviewed.  Review of patient's allergies indicates:   Allergen Reactions    Penicillins Hives     Current Outpatient Prescriptions   Medication Sig Dispense Refill    atorvastatin (LIPITOR) 80 MG tablet Take 1 tablet (80 mg total) by mouth once daily. 30 tablet 5    baclofen (LIORESAL) 10 MG tablet Take 1 tablet (10 mg total) by mouth 4 (four) times daily. 120 tablet 5    benazepril (LOTENSIN) 10 MG tablet Take 1 tablet (10 mg total) by mouth once daily. 30 tablet 5     "clopidogrel (PLAVIX) 75 mg tablet Take 1 tablet (75 mg total) by mouth once daily. 30 tablet 5    furosemide (LASIX) 20 MG tablet Take 1 tablet (20 mg total) by mouth once daily. 30 tablet 5    insulin aspart (NOVOLOG) 100 unit/mL InPn pen Inject 7 Units into the skin 3 (three) times daily with meals. 5 Box 3    insulin glargine (LANTUS) 100 unit/mL injection Inject into the skin every evening.      phenytoin (DILANTIN) 100 MG ER capsule Take 1 capsule (100 mg total) by mouth 3 (three) times daily. 90 capsule 5    tamsulosin (FLOMAX) 0.4 mg Cp24 Take 1 capsule (0.4 mg total) by mouth once daily. 30 capsule 5     No current facility-administered medications for this visit.        Review of Systems   Constitutional: Negative for fever, malaise/fatigue and weight loss.   HENT: Negative for congestion.    Eyes: Negative for blurred vision and pain.   Respiratory: Negative for shortness of breath and wheezing.    Cardiovascular: Negative for chest pain, palpitations and leg swelling.   Gastrointestinal: Negative for abdominal pain, blood in stool, constipation, diarrhea and heartburn.   Genitourinary:        History of present illness   Musculoskeletal: Negative for joint pain.   Neurological: Negative for tingling, focal weakness, weakness and headaches.   Psychiatric/Behavioral: Negative for depression. The patient is not nervous/anxious.        Physical Exam   Vitals:    05/08/17 0938 05/08/17 1002   BP: (!) 157/80 120/78   BP Location: Left arm Left arm   Patient Position: Sitting Sitting   BP Method: Manual    Pulse: 94    Temp: 98.2 °F (36.8 °C)    TempSrc: Oral    SpO2: 98%    Weight: 87.7 kg (193 lb 5.5 oz)    Height: 5' 8" (1.727 m)     Body mass index is 29.4 kg/(m^2).  Weight: 87.7 kg (193 lb 5.5 oz)   Height: 5' 8" (172.7 cm)     Physical Exam   Constitutional: He is oriented to person, place, and time. He appears well-developed and well-nourished. No distress.   Eyes: EOM are normal.   Cardiovascular: " Normal rate, regular rhythm and normal heart sounds.    No murmur heard.  Pulmonary/Chest: Effort normal and breath sounds normal.   Genitourinary:   Genitourinary Comments: Nunez bag draining clear urine   Musculoskeletal: Normal range of motion.   Neurological: He is alert and oriented to person, place, and time. Coordination normal.   Chronic spastic hemiplegia right side   Skin: Skin is warm and dry.   Psychiatric: He has a normal mood and affect. His behavior is normal. Thought content normal.

## 2017-05-08 NOTE — PATIENT INSTRUCTIONS
Diet for Chronic Kidney Disease  Following a special diet when you have kidney disease can help you stay as healthy as possible. Your healthcare provider or dietitian should make a special diet plan just for you.    Eating right  Here are some good eating rules to follow:  · Protein. Eating protein is important for your body. But too much protein can put a strain on your kidneys. Eating less protein may slow the progression of chronic kidney disease. Foods high in protein include meat, fish, eggs, cheese, and other dairy products. A registered dietitian can help you plan a diet that has the right amount of protein for you.  · Sodium. Having too much salt in your diet can make your body hold onto (retain) water. Ask your provider or dietitian how much sodium per day you are allowed. This will help you avoid fluid buildup in your body (fluid retention). It can also help control high blood pressure. Learn to read food labels to know how much sodium is in one serving. Foods high in salt include processed meats, canned and boxed foods, sauces, salted chips and snacks, pickled foods, frozen dinners, and restaurant and fast food.  · Fluids. If you have advanced kidney disease, you will need to limit the water and fluids you drink. If you dont, then too much water will build up in your body. The exact amount of fluid you can drink depends on how well your kidneys are working. Ask your provider how much water you can safely drink each day.  · Potassium. In advanced kidney disease, your potassium level can go dangerously high. This affects your heart. It can cause an irregular heartbeat (arrhythmia). Ask your provider or dietitian if you should limit potassium in your diet. Foods high in potassium include dairy products (milk, yogurt, cheese), dried beans, bananas, oranges, potatoes, tomatoes, spinach, cantaloupe, honeydew melon, dried fruits, and nuts.   · Calcium. Calcium is important to build strong bones. But foods  high in calcium are also high in phosphorus, which can take calcium from your bones. Limiting foods high in phosphorus will help keep calcium in your bones. Ask your provider how much calcium you should get each day.  · Phosphorus. In advanced kidney disease, your phosphorus level can go dangerously high. This affects many systems in the body and can damage your heart. Limit your intake of phosphorus-rich foods. These include dried beans and peas, nuts, peanut butter, cocoa, beer, cola drinks, and dairy products.  Date Last Reviewed: 8/1/2016  © 6793-7614 [employer]. [address] [place] [address], [place] [address]. All rights reserved. This information is not intended as a substitute for professional medical care. Always follow your healthcare professional's instructions.

## 2017-05-08 NOTE — MR AVS SNAPSHOT
[facility]  [address]  [place] [address]  Phone: [phone]  Fax: [phone]                  Miguel Moore   2017 10:00 AM   Office Visit    Description:  Male : 1955   Provider:  Raciel Raymond MD   Department[facility]  [facility]           Reason for Visit     Urinary Retention           Diagnoses this Visit        Comments    Urinary obstruction    -  Primary     Polyuria         Need for vaccination for Strep pneumoniae         Need for diphtheria-tetanus-pertussis (Tdap) vaccine, adult/adolescent         Controlled type 2 diabetes mellitus with stage 3 chronic kidney disease, with long-term current use of insulin         Screening for colon cancer         CKD stage 3 secondary to diabetes         History of stroke with residual effects         Hyperlipidemia, unspecified hyperlipidemia type                To Do List           Future Appointments        Provider Department Dept Phone    2017 9:20 AM Geovany Rucker MD [facility] [phone]    7/3/2017 4:00 PM Gabriella Manjarrez DPM [facility] [phone]      Goals (5 Years of Data)     None       These Medications        Disp Refills Start End    tamsulosin (FLOMAX) 0.4 mg Cp24 60 capsule 5 2017     Take 2 capsules (0.8 mg total) by mouth once daily. Increased dose. - Oral    Pharmacy: [facility]  [place] - [address] [place] [address] Ph #: [phone]         [facility] On [facility]     [facility] On Call Nurse Care Line -  Assistance  Unless otherwise directed by your provider, please contact [facility], our nurse care line that is available for  assistance.     Registered nurses in the [facility] Center provide: appointment scheduling, clinical advisement, health education, and other advisory services.  Call: [phone] (toll free)               Medications           CHANGE how you are taking these medications     Start Taking Instead of     "tamsulosin (FLOMAX) 0.4 mg Cp24 tamsulosin (FLOMAX) 0.4 mg Cp24    Dosage:  Take 2 capsules (0.8 mg total) by mouth once daily. Increased dose. Dosage:  Take 1 capsule (0.4 mg total) by mouth once daily.    Reason for Change:  Reorder            Verify that the below list of medications is an accurate representation of the medications you are currently taking.  If none reported, the list may be blank. If incorrect, please contact your healthcare provider. Carry this list with you in case of emergency.           Current Medications     atorvastatin (LIPITOR) 80 MG tablet Take 1 tablet (80 mg total) by mouth once daily.    baclofen (LIORESAL) 10 MG tablet Take 1 tablet (10 mg total) by mouth 4 (four) times daily.    benazepril (LOTENSIN) 10 MG tablet Take 1 tablet (10 mg total) by mouth once daily.    clopidogrel (PLAVIX) 75 mg tablet Take 1 tablet (75 mg total) by mouth once daily.    furosemide (LASIX) 20 MG tablet Take 1 tablet (20 mg total) by mouth once daily.    insulin aspart (NOVOLOG) 100 unit/mL InPn pen Inject 7 Units into the skin 3 (three) times daily with meals.    insulin glargine (LANTUS) 100 unit/mL injection Inject into the skin every evening.    phenytoin (DILANTIN) 100 MG ER capsule Take 1 capsule (100 mg total) by mouth 3 (three) times daily.    tamsulosin (FLOMAX) 0.4 mg Cp24 Take 2 capsules (0.8 mg total) by mouth once daily. Increased dose.           Clinical Reference Information           Your Vitals Were     BP Pulse Temp Height Weight SpO2    120/78 (BP Location: Left arm, Patient Position: Sitting) 94 98.2 °F (36.8 °C) (Oral) 5' 8" (1.727 m) 87.7 kg (193 lb 5.5 oz) 98%    BMI                29.4 kg/m2          Blood Pressure          Most Recent Value    BP  120/78      Allergies as of 5/8/2017     Penicillins      Immunizations Administered on Date of Encounter - 5/8/2017     Name Date Dose VIS Date Route    Pneumococcal Polysaccharide - 23 Valent  Incomplete 0.5 mL 4/24/2015 Intramuscular "    TDAP  Incomplete 0.5 mL 2/24/2015 Intramuscular      Orders Placed During Today's Visit      Normal Orders This Visit    Ambulatory referral to Urology     Case request GI: COLONOSCOPY, ESOPHAGOGASTRODUODENOSCOPY (EGD)     Pneumococcal Polysaccharide Vaccine (23 Valent) (SQ/IM)     Tdap Vaccine     Future Labs/Procedures Expected by Expires    Diabetic Eye Screening Photo  As directed 5/8/2018      [facility] Sign-Up     Activating your [facility] account is as easy as 1-2-3!     1) Visit [email or web address], select Sign Up Now, enter this activation code and your date of birth, then select Next.  [email or web address]  Expires: 5/18/2017  4:01 PM      2) Create a username and password to use when you visit [facility] in the future and select a security question in case you lose your password and select Next.    3) Enter your e-mail address and click Sign Up!    Additional Information  If you have questions, please e-mail [email or web address] or call [phone] to talk to our [facility] staff. Remember, [facility] is NOT to be used for urgent needs. For medical emergencies, dial 911.         Instructions      Diet for Chronic Kidney Disease  Following a special diet when you have kidney disease can help you stay as healthy as possible. Your healthcare provider or dietitian should make a special diet plan just for you.    Eating right  Here are some good eating rules to follow:  · Protein. Eating protein is important for your body. But too much protein can put a strain on your kidneys. Eating less protein may slow the progression of chronic kidney disease. Foods high in protein include meat, fish, eggs, cheese, and other dairy products. A registered dietitian can help you plan a diet that has the right amount of protein for you.  · Sodium. Having too much salt in your diet can make your body hold onto (retain) water. Ask your provider or dietitian how much sodium per day you are allowed. This will help you avoid fluid  buildup in your body (fluid retention). It can also help control high blood pressure. Learn to read food labels to know how much sodium is in one serving. Foods high in salt include processed meats, canned and boxed foods, sauces, salted chips and snacks, pickled foods, frozen dinners, and restaurant and fast food.  · Fluids. If you have advanced kidney disease, you will need to limit the water and fluids you drink. If you dont, then too much water will build up in your body. The exact amount of fluid you can drink depends on how well your kidneys are working. Ask your provider how much water you can safely drink each day.  · Potassium. In advanced kidney disease, your potassium level can go dangerously high. This affects your heart. It can cause an irregular heartbeat (arrhythmia). Ask your provider or dietitian if you should limit potassium in your diet. Foods high in potassium include dairy products (milk, yogurt, cheese), dried beans, bananas, oranges, potatoes, tomatoes, spinach, cantaloupe, honeydew melon, dried fruits, and nuts.   · Calcium. Calcium is important to build strong bones. But foods high in calcium are also high in phosphorus, which can take calcium from your bones. Limiting foods high in phosphorus will help keep calcium in your bones. Ask your provider how much calcium you should get each day.  · Phosphorus. In advanced kidney disease, your phosphorus level can go dangerously high. This affects many systems in the body and can damage your heart. Limit your intake of phosphorus-rich foods. These include dried beans and peas, nuts, peanut butter, cocoa, beer, cola drinks, and dairy products.  Date Last Reviewed: 8/1/2016 © 2000-2016 [employer]. [address] [place] [address], [place] [address]. All rights reserved. This information is not intended as a substitute for professional medical care. Always follow your healthcare professional's instructions.             Language Assistance  Services     ATTENTION: Language assistance services are available, free of charge. Please call [phone].      ATENCIÓN: Si habla arnol, tiene a babin disposición servicios gratuitos de asistencia lingüística. Llame al [phone].     CHÚ Ý: N?u b?n nói Ti?ng Vi?t, có các d?ch v? h? tr? ngôn ng? mi?n phí dành cho b?n. G?i s? [phone].         [facility] complies with applicable Federal civil rights laws and does not discriminate on the basis of race, color, national origin, age, disability, or sex.

## 2017-05-09 ENCOUNTER — TELEPHONE (OUTPATIENT)
Dept: FAMILY MEDICINE | Facility: CLINIC | Age: 62
End: 2017-05-09

## 2017-05-09 NOTE — TELEPHONE ENCOUNTER
Patient has an appointment 6/6/17 2pm with DR. Chambers at the [facility], patient was notified.

## 2017-05-10 ENCOUNTER — CLINICAL SUPPORT (OUTPATIENT)
Dept: OPHTHALMOLOGY | Facility: CLINIC | Age: 62
End: 2017-05-10
Attending: INTERNAL MEDICINE
Payer: COMMERCIAL

## 2017-05-10 ENCOUNTER — TELEPHONE (OUTPATIENT)
Dept: OPTOMETRY | Facility: CLINIC | Age: 62
End: 2017-05-10

## 2017-05-10 DIAGNOSIS — E11.22 CONTROLLED TYPE 2 DIABETES MELLITUS WITH STAGE 3 CHRONIC KIDNEY DISEASE, WITH LONG-TERM CURRENT USE OF INSULIN: Chronic | ICD-10-CM

## 2017-05-10 DIAGNOSIS — Z79.4 CONTROLLED TYPE 2 DIABETES MELLITUS WITH STAGE 3 CHRONIC KIDNEY DISEASE, WITH LONG-TERM CURRENT USE OF INSULIN: Chronic | ICD-10-CM

## 2017-05-10 DIAGNOSIS — N18.30 CONTROLLED TYPE 2 DIABETES MELLITUS WITH STAGE 3 CHRONIC KIDNEY DISEASE, WITH LONG-TERM CURRENT USE OF INSULIN: Chronic | ICD-10-CM

## 2017-05-10 PROCEDURE — 99999 PR PBB SHADOW E&M-EST. PATIENT-LVL I: CPT | Mod: PBBFAC,,,

## 2017-05-10 PROCEDURE — 92227 IMG RTA DETCJ/MNTR DS STAFF: CPT | Mod: S$GLB,,, | Performed by: OPHTHALMOLOGY

## 2017-05-10 NOTE — PROGRESS NOTES
HPI     60 y/o here for screening for diabetic retinopathy with non-dilated   fundus photos per   Dr. Raymond.       Last edited by Ursula Albarran on 5/10/2017 11:21 AM.         Assessment /Plan     For exam results, see Encounter Report.    Controlled type 2 diabetes mellitus with stage 3 chronic kidney disease, with long-term current use of insulin  -     Diabetic Eye Screening Photo      Please see Dr. Lozano progress note for interpretation.

## 2017-05-10 NOTE — TELEPHONE ENCOUNTER
Notified pt of eye cam results; images inadequate to diagnose recommending pt to make appt with primary eye care physician.  Pt agreed to make appt with [facility].  Explained Medical and Vision plan insurance to pt ; fact that if chooses to have vision checked insurance may not pay and will be responsible for $30 refraction fee.  Dilation exam will be filed through medical plan ([employer]).  Pt verbally agreed/understood stating he wanted full exam due to not having exam in a long time.  Pt appt on 5/15/17 @ 10:30 AM.

## 2017-05-11 ENCOUNTER — TELEPHONE (OUTPATIENT)
Dept: NEUROLOGY | Facility: CLINIC | Age: 62
End: 2017-05-11

## 2017-05-11 NOTE — TELEPHONE ENCOUNTER
----- Message from Padma Vera sent at 5/11/2017  1:02 PM CDT -----  Contact: Self  Pt calling to speak to nurse regarding catheter. Please call [phone]

## 2017-05-12 DIAGNOSIS — E11.9 TYPE 2 DIABETES MELLITUS WITHOUT COMPLICATION: ICD-10-CM

## 2017-05-12 DIAGNOSIS — Z12.11 COLON CANCER SCREENING: Primary | ICD-10-CM

## 2017-05-15 ENCOUNTER — OFFICE VISIT (OUTPATIENT)
Dept: OPTOMETRY | Facility: CLINIC | Age: 62
End: 2017-05-15
Payer: COMMERCIAL

## 2017-05-15 DIAGNOSIS — Z01.00 DIABETIC EYE EXAM: ICD-10-CM

## 2017-05-15 DIAGNOSIS — H52.7 REFRACTIVE ERROR: ICD-10-CM

## 2017-05-15 DIAGNOSIS — H25.13 NUCLEAR SCLEROSIS, BILATERAL: ICD-10-CM

## 2017-05-15 DIAGNOSIS — E11.9 DIABETIC EYE EXAM: ICD-10-CM

## 2017-05-15 DIAGNOSIS — H25.043 POSTERIOR SUBCAPSULAR AGE-RELATED CATARACT OF BOTH EYES: Primary | ICD-10-CM

## 2017-05-15 DIAGNOSIS — I10 ESSENTIAL HYPERTENSION: Chronic | ICD-10-CM

## 2017-05-15 PROCEDURE — 99999 PR PBB SHADOW E&M-EST. PATIENT-LVL II: CPT | Mod: PBBFAC,,, | Performed by: OPTOMETRIST

## 2017-05-15 PROCEDURE — 92004 COMPRE OPH EXAM NEW PT 1/>: CPT | Mod: S$GLB,,, | Performed by: OPTOMETRIST

## 2017-05-15 NOTE — PROGRESS NOTES
Subjective:       Patient ID: Miguel Moore is a 61 y.o. male      Chief Complaint   Patient presents with    Concerns About Ocular Health    Diabetic Eye Exam     IDDM 2; A1c = 6.3 (3/21/17)    Hypertensive Eye Exam     History of Present Illness  Dls: ? Yrs     Pt here for diabetic and hypertensive eye exam.   Pt c/o blurry vision at near ou. Pt wears otc readers. Pt states no   tearing no itching no burning no pain no ha's no floaters.     Eye meds:   None    Hemoglobin A1C       Date                     Value               Ref Range           Status                03/21/2017               6.3 (H)             4.5 - 6.2 %         Final             ----------        Assessment/Plan:     1. Posterior subcapsular age-related cataract of both eyes  2. Nuclear sclerosis, bilateral  Visually significant cataract OD >> OS. Discussed diagnosis with patient, different lens options, and surgical process. Referral to Dr. Tay for cataract evaluation.    - Ambulatory referral to Ophthalmology    3. Diabetic eye exam  No diabetic retinopathy OS. Unable to assess OD due to dense cataract. Educated patient on importance of good blood sugar control, compliance with meds, and follow up care with PCP.     4. Essential hypertension  No hypertensive retinopathy. Continue BP control. RTC 1 year for DFE.    5. Refractive error  No Srx today. Pt to proceed with cataract surgery.    Return for Cataract consult with Dr. Tay.

## 2017-05-15 NOTE — MR AVS SNAPSHOT
[facility]  [address] [place] [address]  Phone: [phone]  Fax: [phone]                  Miguel Moore   5/15/2017 10:30 AM   Office Visit    Description:  Male : 1955   Provider:  Caroline Rogers OD   Department:  [facility]           Reason for Visit     Concerns About Ocular Health     Diabetic Eye Exam     Hypertensive Eye Exam           Diagnoses this Visit        Comments    Posterior subcapsular age-related cataract of both eyes    -  Primary     Nuclear sclerosis, bilateral         Diabetic eye exam         Essential hypertension         Refractive error                To Do List           Future Appointments        Provider Department Dept Phone    2017 10:20 AM Paige Garcia PA-C [facility] - Urology 4th Floor [phone]    2017 1:45 PM Mac Chambers Jr., MD [facility] [phone]    2017 11:20 AM Geovany Rucker MD [facility]- Neurology [phone]    2017 1:30 PM Bob Tay MD [facility] [phone]    7/3/2017 4:00 PM Gabriella Manjarrez DPM [facility] [phone]      Your Future Surgeries/Procedures     2017   Surgery with Desmond Chapman MD   [facility] ([facility])    [address] [place] [address] [phone]              Goals (5 Years of Data)     None      Follow-Up and Disposition     Return for Cataract consult with Dr. Tay.      [facility]     [facility] Nurse Care Line -  Assistance  Unless otherwise directed by your provider, please contact [facility], our nurse care line that is available for  assistance.     Registered nurses in the [facility] provide: appointment scheduling, clinical advisement, health education, and other advisory services.  Call: [phone] (toll free)               Medications                Verify that the below list of medications is an accurate  representation of the medications you are currently taking.  If none reported, the list may be blank. If incorrect, please contact your healthcare provider. Carry this list with you in case of emergency.           Current Medications     atorvastatin (LIPITOR) 80 MG tablet Take 1 tablet (80 mg total) by mouth once daily.    baclofen (LIORESAL) 10 MG tablet Take 1 tablet (10 mg total) by mouth 4 (four) times daily.    benazepril (LOTENSIN) 10 MG tablet Take 1 tablet (10 mg total) by mouth once daily.    clopidogrel (PLAVIX) 75 mg tablet Take 1 tablet (75 mg total) by mouth once daily.    furosemide (LASIX) 20 MG tablet Take 1 tablet (20 mg total) by mouth once daily.    insulin aspart (NOVOLOG) 100 unit/mL InPn pen Inject 7 Units into the skin 3 (three) times daily with meals.    insulin glargine (LANTUS) 100 unit/mL injection Inject into the skin every evening.    phenytoin (DILANTIN) 100 MG ER capsule Take 1 capsule (100 mg total) by mouth 3 (three) times daily.    tamsulosin (FLOMAX) 0.4 mg Cp24 Take 2 capsules (0.8 mg total) by mouth once daily. Increased dose.           Clinical Reference Information           Allergies as of 5/15/2017     Penicillins      Immunizations Administered on Date of Encounter - 5/15/2017     None      Orders Placed During Today's Visit      Normal Orders This Visit    Ambulatory referral to Ophthalmology       [facility] Sign-Up     Activating your [facility] account is as easy as 1-2-3!     1) Visit [email or web address], select Sign Up Now, enter this activation code and your date of birth, then select Next.  [email or web address]  Expires: 5/18/2017  4:01 PM      2) Create a username and password to use when you visit [facility] in the future and select a security question in case you lose your password and select Next.    3) Enter your e-mail address and click Sign Up!    Additional Information  If you have questions, please e-mail [email or web address] or call [phone] to talk to our  [facility] staff. Remember, [facility] is NOT to be used for urgent needs. For medical emergencies, dial 911.         Language Assistance Services     ATTENTION: Language assistance services are available, free of charge. Please call [phone].      ATENCIÓN: Si habla bayleeañol, tiene a babin disposición servicios gratuitos de asistencia lingüística. Llame al [phone].     CHÚ Ý: N?u b?n nói Ti?ng Vi?t, có các d?ch v? h? tr? ngôn ng? mi?n phí dành cho b?n. G?i s? [phone].         [facility] complies with applicable Federal civil rights laws and does not discriminate on the basis of race, color, national origin, age, disability, or sex.

## 2017-05-15 NOTE — LETTER
May 15, 2017      Raciel Raymond MD  [address]  [place] [address]           [facility]  [address]  [place] [address]  Phone: [phone]  Fax: [phone]          Patient: Migule Moore   MR Number: [id number]   YOB: 1955   Date of Visit: 5/15/2017       Dear Dr. Raciel Raymond:    Thank you for referring Miguel Moore to me for evaluation. Attached you will find relevant portions of my assessment and plan of care.    If you have questions, please do not hesitate to call me. I look forward to following Miguel Moore along with you.    Sincerely,    Caroline Rogers, OD    Enclosure  CC:  No Recipients    If you would like to receive this communication electronically, please contact [email or web address] or [phone] to request more information on [employer] Link access.    For providers and/or their staff who would like to refer a patient to [facility], please contact us through our one-stop-shop provider referral line, [facility], at [phone].    If you feel you have received this communication in error or would no longer like to receive these types of communications, please e-mail [email or web address]

## 2017-05-16 ENCOUNTER — OFFICE VISIT (OUTPATIENT)
Dept: UROLOGY | Facility: CLINIC | Age: 62
End: 2017-05-16
Payer: COMMERCIAL

## 2017-05-16 VITALS
SYSTOLIC BLOOD PRESSURE: 128 MMHG | HEIGHT: 68 IN | BODY MASS INDEX: 28.95 KG/M2 | DIASTOLIC BLOOD PRESSURE: 83 MMHG | HEART RATE: 80 BPM | WEIGHT: 191 LBS

## 2017-05-16 DIAGNOSIS — R35.0 FREQUENCY OF URINATION: ICD-10-CM

## 2017-05-16 DIAGNOSIS — R33.9 URINARY RETENTION: ICD-10-CM

## 2017-05-16 DIAGNOSIS — R35.1 NOCTURIA: ICD-10-CM

## 2017-05-16 DIAGNOSIS — N13.8 BPH (BENIGN PROSTATIC HYPERTROPHY) WITH URINARY OBSTRUCTION: Primary | ICD-10-CM

## 2017-05-16 DIAGNOSIS — N40.1 BPH (BENIGN PROSTATIC HYPERTROPHY) WITH URINARY OBSTRUCTION: Primary | ICD-10-CM

## 2017-05-16 PROCEDURE — 99204 OFFICE O/P NEW MOD 45 MIN: CPT | Mod: 25,S$GLB,, | Performed by: PHYSICIAN ASSISTANT

## 2017-05-16 PROCEDURE — 51700 IRRIGATION OF BLADDER: CPT | Mod: S$GLB,,, | Performed by: PHYSICIAN ASSISTANT

## 2017-05-16 PROCEDURE — 3074F SYST BP LT 130 MM HG: CPT | Mod: S$GLB,,, | Performed by: PHYSICIAN ASSISTANT

## 2017-05-16 PROCEDURE — 3079F DIAST BP 80-89 MM HG: CPT | Mod: S$GLB,,, | Performed by: PHYSICIAN ASSISTANT

## 2017-05-16 PROCEDURE — 1160F RVW MEDS BY RX/DR IN RCRD: CPT | Mod: S$GLB,,, | Performed by: PHYSICIAN ASSISTANT

## 2017-05-16 PROCEDURE — 99999 PR PBB SHADOW E&M-EST. PATIENT-LVL III: CPT | Mod: PBBFAC,,, | Performed by: PHYSICIAN ASSISTANT

## 2017-05-16 NOTE — PROGRESS NOTES
CHIEF COMPLAINT:    Mr. Moore is a 61 y.o. male presenting for urinary retention.   PRESENTING ILLNESS:    Miguel Moore is a 61 y.o. male who presents for urinary retention.  He reports the difficulty urinating and bladder pain.  He presented to the [place] ED the following day (5/5/17).  Nunez catheter was placed and his pain resolved.  He is here today for a voiding trial.  His catheter has been draining well.  He has a history of prostate issues.  He reports an episode in which he experienced urinary retention a few years ago.  He has been on flomax for several years.  He was seen by his pcp on 5/8 who increased his flomax to 0.8mg.  Prior to presenting to the ED earlier this month he reports frequency, and nocturia.  He denies hesitancy, intermittency.  He felt like he emptied his bladder completely.  He reports taking something in the past that stopped him for urinating so frequently.  He can not recall the name.     He denies a family and personal history of prostate cancer.      REVIEW OF SYSTEMS:    Constitutional: Negative for fever and chills.   HENT: Negative for hearing loss.   Eyes: Negative for visual disturbance.   Respiratory: Negative for shortness of breath.   Cardiovascular: Negative for chest pain.   Gastrointestinal: Negative for nausea, vomiting, and constipation.   Genitourinary: Positive for difficulty urinating, incomplete bladder emptying, frequency, nocturia.  Negative for urgency, incontinence, dysuria, hematuria, intermittency, hesitancy, and decreased urine volume.   Neurological: Negative for dizziness.   Hematological: Does not bruise/bleed easily.   Psychiatric/Behavioral: Negative for confusion.       PATIENT HISTORY:    Past Medical History:   Diagnosis Date    Diabetes mellitus, type 2     Hypertension     Stroke        No past surgical history on file.    Family History   Problem Relation Age of Onset    Diabetes Mother     Heart disease Mother     Hypertension  Mother     Cataracts Mother     No Known Problems Father     Hypertension Sister     No Known Problems Brother     No Known Problems Daughter     No Known Problems Maternal Aunt     No Known Problems Maternal Uncle     No Known Problems Paternal Aunt     No Known Problems Paternal Uncle     No Known Problems Maternal Grandmother     No Known Problems Maternal Grandfather     No Known Problems Paternal Grandmother     No Known Problems Paternal Grandfather     Amblyopia Neg Hx     Blindness Neg Hx     Cancer Neg Hx     Glaucoma Neg Hx     Macular degeneration Neg Hx     Retinal detachment Neg Hx     Strabismus Neg Hx     Stroke Neg Hx     Thyroid disease Neg Hx        Social History     Social History    Marital status: Single     Spouse name: N/A    Number of children: N/A    Years of education: N/A     Occupational History    disabled - former  - dozers, etc      Social History Main Topics    Smoking status: Never Smoker    Smokeless tobacco: Not on file    Alcohol use Not on file    Drug use: Not on file    Sexual activity: Not on file     Other Topics Concern    Not on file     Social History Narrative       Allergies:  Penicillins    Medications:    Current Outpatient Prescriptions:     atorvastatin (LIPITOR) 80 MG tablet, Take 1 tablet (80 mg total) by mouth once daily., Disp: 30 tablet, Rfl: 5    baclofen (LIORESAL) 10 MG tablet, Take 1 tablet (10 mg total) by mouth 4 (four) times daily., Disp: 120 tablet, Rfl: 5    benazepril (LOTENSIN) 10 MG tablet, Take 1 tablet (10 mg total) by mouth once daily., Disp: 30 tablet, Rfl: 5    clopidogrel (PLAVIX) 75 mg tablet, Take 1 tablet (75 mg total) by mouth once daily., Disp: 30 tablet, Rfl: 5    furosemide (LASIX) 20 MG tablet, Take 1 tablet (20 mg total) by mouth once daily., Disp: 30 tablet, Rfl: 5    insulin aspart (NOVOLOG) 100 unit/mL InPn pen, Inject 7 Units into the skin 3 (three) times daily with meals.,  Disp: 5 Box, Rfl: 3    insulin glargine (LANTUS) 100 unit/mL injection, Inject into the skin every evening., Disp: , Rfl:     phenytoin (DILANTIN) 100 MG ER capsule, Take 1 capsule (100 mg total) by mouth 3 (three) times daily., Disp: 90 capsule, Rfl: 5    tamsulosin (FLOMAX) 0.4 mg Cp24, Take 2 capsules (0.8 mg total) by mouth once daily. Increased dose., Disp: 60 capsule, Rfl: 5    PHYSICAL EXAMINATION:     Constitutional: He appears well-developed and well-nourished.  He is in no apparent distress.    Eyes: No scleral icterus noted bilaterally.  No discharge noted bilaterally.    Cardiovascular: Normal rate.  Pitting edema noted in lower right extremity    Pulmonary/Chest: Effort normal. No respiratory distress.     Abdominal:  He exhibits no distension.  There is no CVA tenderness.     Lymphadenopathy:        Right: No supraclavicular adenopathy present.        Left: No supraclavicular adenopathy present.     Neurological: He is alert and oriented to person, place, and time.     Skin: Skin is warm and dry.     Psych: Cooperative with normal affect.    Genitourinary:  Normal anal sphincter tone. No rectal mass.  Prostate is smooth, non tender with no nodules noted.    Physical Exam    Yellow urine noted in leg bag.      LABS:  No results found for: PSA, PSADIAG, PSATOTAL, PSAFREE, PSAFREEPCT    IMPRESSION:    Encounter Diagnoses   Name Primary?    BPH (benign prostatic hypertrophy) with urinary obstruction Yes    Urinary retention     Nocturia     Frequency of urination          PLAN:    Given patient's recent catheterization, will check psa in 1 month.    Voiding trial performed by Nurse Jones.  180ml of sterile water was instilled into bladder.  Bladder began to spasm and sterile water started coming out before catheter was removed.  Nunez catheter was removed. Patient urinated 120ml.    Patient was instructed to drink plenty of fluids today.  Instructed patient to call at 2p.m. to give an update on urine  output.  I instructed patient to return to clinic or emergency department (if after clinic hours) to have sinclair catheter put back in if unable to urinate within 5 hours of sinclair catheter removal or starts to experience bladder pressure/pain, decrease flow, straining/difficulty urinating.    Patient voiced understanding.    Continue flomax 0.8mg    Return to clinic in 6-8 weeks for pvr.    Paige Garcia PA-C

## 2017-05-23 ENCOUNTER — TELEPHONE (OUTPATIENT)
Dept: UROLOGY | Facility: CLINIC | Age: 62
End: 2017-05-23

## 2017-05-23 NOTE — TELEPHONE ENCOUNTER
----- Message from Ann Mojica sent at 5/23/2017 12:34 PM CDT -----  Contact: SELF  Pt calling to request and earlier appt due to having a catheter put in by [facility] ER.  Please call pt at .    Thanks

## 2017-05-30 ENCOUNTER — OFFICE VISIT (OUTPATIENT)
Dept: NEUROLOGY | Facility: CLINIC | Age: 62
End: 2017-05-30
Payer: COMMERCIAL

## 2017-05-30 VITALS
DIASTOLIC BLOOD PRESSURE: 90 MMHG | SYSTOLIC BLOOD PRESSURE: 170 MMHG | HEART RATE: 92 BPM | HEIGHT: 68 IN | WEIGHT: 191.69 LBS | BODY MASS INDEX: 29.05 KG/M2

## 2017-05-30 DIAGNOSIS — I69.398 SEIZURE DISORDER AS SEQUELA OF CEREBROVASCULAR ACCIDENT: Chronic | ICD-10-CM

## 2017-05-30 DIAGNOSIS — I69.30 HISTORY OF STROKE WITH RESIDUAL EFFECTS: Chronic | ICD-10-CM

## 2017-05-30 DIAGNOSIS — I69.359 HEMIPARESIS AFFECTING DOMINANT SIDE AS LATE EFFECT OF CEREBROVASCULAR ACCIDENT: Primary | ICD-10-CM

## 2017-05-30 DIAGNOSIS — I10 ESSENTIAL HYPERTENSION: ICD-10-CM

## 2017-05-30 DIAGNOSIS — G40.909 SEIZURE DISORDER AS SEQUELA OF CEREBROVASCULAR ACCIDENT: Chronic | ICD-10-CM

## 2017-05-30 PROCEDURE — 99999 PR PBB SHADOW E&M-EST. PATIENT-LVL III: CPT | Mod: PBBFAC,,, | Performed by: NEUROLOGICAL SURGERY

## 2017-05-30 PROCEDURE — 99204 OFFICE O/P NEW MOD 45 MIN: CPT | Mod: S$GLB,,, | Performed by: NEUROLOGICAL SURGERY

## 2017-05-30 RX ORDER — BENAZEPRIL HYDROCHLORIDE 20 MG/1
20 TABLET ORAL DAILY
Qty: 30 TABLET | Refills: 11 | Status: SHIPPED | OUTPATIENT
Start: 2017-05-30 | End: 2017-08-30 | Stop reason: SDUPTHER

## 2017-05-30 NOTE — LETTER
May 30, 2017      Raciel Raymond MD  [address]  [place] [address]           [facility] [address] [facility] [address]  [facility] [address]  Phone: [phone]  Fax: [phone]          Patient: Miguel Moore   MR Number: [id number]   YOB: 1955   Date of Visit: 5/30/2017       Dear Dr. Raciel Raymond:    Thank you for referring Miguel Moore to me for evaluation. Attached you will find relevant portions of my assessment and plan of care.    If you have questions, please do not hesitate to call me. I look forward to following Miguel Moore along with you.    Sincerely,    Geovany Rucker MD    Enclosure  CC:  No Recipients    If you would like to receive this communication electronically, please contact [email or web address] or [phone] to request more information on [employer] Link access.    For providers and/or their staff who would like to refer a patient to [facility], please contact us through our one-stop-shop provider referral line, [facility], at [phone].    If you feel you have received this communication in error or would no longer like to receive these types of communications, please e-mail [email or web address]

## 2017-05-30 NOTE — PROGRESS NOTES
Chief Complaint   Patient presents with    Seizures        Miguel Moore is a 61 y.o. male with a history of multiple medical diagnoses as listed below that presents for evaluation of stroke. He was hospitalized for stroke in the past and while in the hospital he says that he was told that he had a seizure. Since the time he was hospitalized he has been doing well overall though he has still been dealing with a dense right hemiparesis. He has been dealing with significant muscle spasms in the right arm and leg as well. He has been compliant with all medications since he has been discharged from the hospital.     PAST MEDICAL HISTORY:  Past Medical History:   Diagnosis Date    Diabetes mellitus, type 2     Hypertension     Stroke        PAST SURGICAL HISTORY:  History reviewed. No pertinent surgical history.    SOCIAL HISTORY:  Social History     Social History    Marital status: Single     Spouse name: N/A    Number of children: N/A    Years of education: N/A     Occupational History    disabled - former  - dozers, etc      Social History Main Topics    Smoking status: Never Smoker    Smokeless tobacco: Not on file    Alcohol use Not on file    Drug use: Unknown    Sexual activity: Not on file     Other Topics Concern    Not on file     Social History Narrative    No narrative on file       FAMILY HISTORY:  Family History   Problem Relation Age of Onset    Diabetes Mother     Heart disease Mother     Hypertension Mother     Cataracts Mother     No Known Problems Father     Hypertension Sister     No Known Problems Brother     No Known Problems Daughter     No Known Problems Maternal Aunt     No Known Problems Maternal Uncle     No Known Problems Paternal Aunt     No Known Problems Paternal Uncle     No Known Problems Maternal Grandmother     No Known Problems Maternal Grandfather     No Known Problems Paternal Grandmother     No Known Problems Paternal Grandfather      Amblyopia Neg Hx     Blindness Neg Hx     Cancer Neg Hx     Glaucoma Neg Hx     Macular degeneration Neg Hx     Retinal detachment Neg Hx     Strabismus Neg Hx     Stroke Neg Hx     Thyroid disease Neg Hx        ALLERGIES AND MEDICATIONS: updated and reviewed.  Review of patient's allergies indicates:   Allergen Reactions    Penicillins Hives     Current Outpatient Prescriptions   Medication Sig Dispense Refill    atorvastatin (LIPITOR) 80 MG tablet Take 1 tablet (80 mg total) by mouth once daily. 30 tablet 5    baclofen (LIORESAL) 10 MG tablet Take 1 tablet (10 mg total) by mouth 4 (four) times daily. 120 tablet 5    benazepril (LOTENSIN) 20 MG tablet Take 1 tablet (20 mg total) by mouth once daily. 30 tablet 11    clopidogrel (PLAVIX) 75 mg tablet Take 1 tablet (75 mg total) by mouth once daily. 30 tablet 5    furosemide (LASIX) 20 MG tablet Take 1 tablet (20 mg total) by mouth once daily. 30 tablet 5    insulin aspart (NOVOLOG) 100 unit/mL InPn pen Inject 7 Units into the skin 3 (three) times daily with meals. 5 Box 3    insulin glargine (LANTUS) 100 unit/mL injection Inject into the skin every evening.      phenytoin (DILANTIN) 100 MG ER capsule Take 1 capsule (100 mg total) by mouth 3 (three) times daily. 90 capsule 5    tamsulosin (FLOMAX) 0.4 mg Cp24 Take 2 capsules (0.8 mg total) by mouth once daily. Increased dose. 60 capsule 5     No current facility-administered medications for this visit.        Review of Systems   Constitutional: Negative for activity change, fatigue and unexpected weight change.   HENT: Negative for trouble swallowing and voice change.    Eyes: Negative for photophobia, pain and visual disturbance.   Respiratory: Negative for apnea and shortness of breath.    Cardiovascular: Negative for chest pain and palpitations.   Gastrointestinal: Negative for constipation, nausea and vomiting.   Genitourinary: Negative for difficulty urinating.   Musculoskeletal: Positive  for gait problem, myalgias and neck stiffness. Negative for arthralgias, back pain and neck pain.   Skin: Negative for color change and rash.   Neurological: Positive for facial asymmetry, speech difficulty, weakness and numbness. Negative for dizziness, seizures, syncope, light-headedness and headaches.   Psychiatric/Behavioral: Negative for agitation, behavioral problems and confusion.       Neurologic Exam     Mental Status   Oriented to person, place, and time.   Registration: recalls 3 of 3 objects.   Attention: normal. Concentration: normal.   Speech: speech is normal   Level of consciousness: alert  Knowledge: good.     Cranial Nerves     CN II   Visual fields full to confrontation.   Right visual field deficit: none  Left visual field deficit: none     CN III, IV, VI   Pupils are equal, round, and reactive to light.  Extraocular motions are normal.   Right pupil: Size: 3 mm. Shape: regular. Accommodation: intact.   Left pupil: Size: 3 mm. Shape: regular. Accommodation: intact.   CN III: no CN III palsy  CN VI: no CN VI palsy  Nystagmus: none   Diplopia: none  Ophthalmoparesis: none  Upgaze: normal  Downgaze: normal  Conjugate gaze: present    CN V   Facial sensation intact.   Right facial sensation deficit: none  Left facial sensation deficit: none    CN VII   Facial expression full, symmetric.   Right facial weakness: central  Left facial weakness: none    CN VIII   CN VIII normal.     CN IX, X   CN IX normal.   CN X normal.   Palate: symmetric    CN XI   CN XI normal.   Right sternocleidomastoid strength: weak  Left sternocleidomastoid strength: normal  Right trapezius strength: weak  Left trapezius strength: normal    CN XII   CN XII normal.   Tongue deviation: right    Motor Exam   Muscle bulk: normal  Overall muscle tone: normal  Right arm tone: normal  Left arm tone: normal  Right leg tone: normal  Left leg tone: normal    Strength   Right deltoid: 2/5  Right biceps: 2/5  Right triceps: 2/5  Right  wrist flexion: 2/5  Right wrist extension: 2/5  Right interossei: 2/5  Right iliopsoas: 3/5  Right quadriceps: 4/5  Right hamstrin/5  Right anterior tibial: 3/5  Right posterior tibial: 3/5  Right peroneal: 3/5  Right gastroc: 3/5    Sensory Exam   Right arm light touch: normal  Left arm light touch: normal  Right leg light touch: normal  Left leg light touch: normal  Right arm vibration: normal  Left arm vibration: normal  Right leg vibration: normal  Left leg vibration: normal  Right arm proprioception: normal  Left arm proprioception: normal  Right leg proprioception: normal  Left leg proprioception: normal  Right arm pinprick: normal  Left arm pinprick: normal  Right leg pinprick: normal  Left leg pinprick: normal    Gait, Coordination, and Reflexes     Gait  Gait: normal    Coordination   Romberg: negative  Finger to nose coordination: normal  Heel to shin coordination: normal  Tandem walking coordination: normal    Tremor   Resting tremor: absent    Reflexes   Right brachioradialis: 2+  Left brachioradialis: 2+  Right biceps: 2+  Left biceps: 2+  Right triceps: 2+  Left triceps: 2+  Right patellar: 2+  Left patellar: 2+  Right achilles: 2+  Left achilles: 2+  Right plantar: normal  Left plantar: normal      Physical Exam   Constitutional: He is oriented to person, place, and time. He appears well-developed and well-nourished.   HENT:   Head: Normocephalic and atraumatic.   Eyes: EOM are normal. Pupils are equal, round, and reactive to light.   Cardiovascular: Intact distal pulses.    Pulmonary/Chest: Effort normal. No respiratory distress.   Musculoskeletal: Normal range of motion.   Neurological: He is alert and oriented to person, place, and time. He has a normal Finger-Nose-Finger Test, a normal Heel to Shin Test, a normal Romberg Test and a normal Tandem Gait Test. Gait normal.   Reflex Scores:       Tricep reflexes are 2+ on the right side and 2+ on the left side.       Bicep reflexes are 2+ on the  "right side and 2+ on the left side.       Brachioradialis reflexes are 2+ on the right side and 2+ on the left side.       Patellar reflexes are 2+ on the right side and 2+ on the left side.       Achilles reflexes are 2+ on the right side and 2+ on the left side.  Skin: Skin is warm and dry.   Psychiatric: He has a normal mood and affect. His speech is normal and behavior is normal.       Vitals:    05/30/17 1112   BP: (!) 170/90   BP Location: Left arm   Patient Position: Sitting   BP Method: Manual   Pulse: 92   Weight: 87 kg (191 lb 11 oz)   Height: 5' 8" (1.727 m)       Assessment & Plan:    Problem List Items Addressed This Visit     History of stroke with residual effects (Chronic)    Overview     1/2015 Carotid Ultrasound showed no hemodynamic significant stenosis.  1/2015: TTE showed EF > 55%, normal diastolic filling, and negative bubble study.  1/2015: MRI Brain:Focal early subacute infarct along the deep cerebral white matter in the left periventricular corona radiata without evidence of acute bleed. Moderate dilation of the lateral ventricles without focal abnormal signal abnormality, this may represent chronic diffuse cerebral white matter atrophy, normal-pressure hydrocephalus is not excluded.  1/2015: MRA Brain: Mild to moderate (50-60% narrowing) of the M1 segment of the left MCA.   1/2015: CTA of head and neck: No hemodynamically significant lesions are observed vessels of the neck. There is moderate stenoses in the cavernous segment of the left internal carotid artery, the M1 segment of the left middle cerebral artery, and M2 segment of the right middle cerebral artery. No major intracranial vessel occlusion is identified.  S/p stenting of the left ICA by patient report         Essential hypertension (Chronic)    Relevant Medications    benazepril (LOTENSIN) 20 MG tablet    Seizure disorder as sequela of cerebrovascular accident (Chronic)    Hemiparesis affecting dominant side as late effect of " cerebrovascular accident - Primary    Relevant Orders    Ambulatory Referral to Physical/Occupational Therapy      Other Visit Diagnoses    None.         Follow-up: Return in about 3 months (around 8/30/2017).   More than 50% of this 45 minute encounter was spent in counseling and coordinating care.

## 2017-06-01 ENCOUNTER — OFFICE VISIT (OUTPATIENT)
Dept: UROLOGY | Facility: CLINIC | Age: 62
End: 2017-06-01
Payer: COMMERCIAL

## 2017-06-01 VITALS
HEART RATE: 82 BPM | DIASTOLIC BLOOD PRESSURE: 70 MMHG | SYSTOLIC BLOOD PRESSURE: 122 MMHG | BODY MASS INDEX: 29.16 KG/M2 | WEIGHT: 191.81 LBS

## 2017-06-01 DIAGNOSIS — R33.9 RETENTION OF URINE: Primary | ICD-10-CM

## 2017-06-01 PROCEDURE — 99999 PR PBB SHADOW E&M-EST. PATIENT-LVL III: CPT | Mod: PBBFAC,,, | Performed by: UROLOGY

## 2017-06-01 PROCEDURE — 99213 OFFICE O/P EST LOW 20 MIN: CPT | Mod: S$GLB,,, | Performed by: UROLOGY

## 2017-06-01 NOTE — LETTER
June 1, 2017      Raciel Raymond MD  [address]  [place] [address]           [facility] - Urology 4th Floor  [address]  [place] [address]  Phone: [phone]          Patient: Miguel Moore   MR Number: [id number]   YOB: 1955   Date of Visit: 6/1/2017       Dear Dr. Raciel Raymond:    Thank you for referring Miguel Moore to me for evaluation. Attached you will find relevant portions of my assessment and plan of care.    If you have questions, please do not hesitate to call me. I look forward to following Miguel Moore along with you.    Sincerely,    Mac Chambers Jr., MD    Enclosure  CC:  No Recipients    If you would like to receive this communication electronically, please contact [email or web address] or [phone] to request more information on [employer] Link access.    For providers and/or their staff who would like to refer a patient to [facility], please contact us through our one-stop-shop provider referral line, [facility], at [phone].    If you feel you have received this communication in error or would no longer like to receive these types of communications, please e-mail [email or web address]

## 2017-06-01 NOTE — PROGRESS NOTES
Subjective:       Patient ID: Miguel Moore is a 61 y.o. male.    Chief Complaint: Urinary Retention (sinclair cath placed at [facility] on 5/22. )    HPI patient is here for 2 episodes of urinary retention.  He's also had urinary retention several years ago.  He status post CVA with a right arm paralysis.  He has an indwelling Sinclair.  He saw HANNAH Garcia last month and states was small.  No PSA is down and I will try and repeat one with a Sinclair catheter in place.  He will need a workup    Past Medical History:   Diagnosis Date    Diabetes mellitus, type 2     Hypertension     Stroke        History reviewed. No pertinent surgical history.    Family History   Problem Relation Age of Onset    Diabetes Mother     Heart disease Mother     Hypertension Mother     Cataracts Mother     No Known Problems Father     Hypertension Sister     No Known Problems Brother     No Known Problems Daughter     No Known Problems Maternal Aunt     No Known Problems Maternal Uncle     No Known Problems Paternal Aunt     No Known Problems Paternal Uncle     No Known Problems Maternal Grandmother     No Known Problems Maternal Grandfather     No Known Problems Paternal Grandmother     No Known Problems Paternal Grandfather     Amblyopia Neg Hx     Blindness Neg Hx     Cancer Neg Hx     Glaucoma Neg Hx     Macular degeneration Neg Hx     Retinal detachment Neg Hx     Strabismus Neg Hx     Stroke Neg Hx     Thyroid disease Neg Hx        Social History     Social History    Marital status: Single     Spouse name: N/A    Number of children: N/A    Years of education: N/A     Occupational History    disabled - former  - dozers, etc      Social History Main Topics    Smoking status: Never Smoker    Smokeless tobacco: Not on file    Alcohol use Not on file    Drug use: Unknown    Sexual activity: Not on file     Other Topics Concern    Not on file     Social History Narrative    No narrative on  file       Allergies:  Penicillins    Medications:    Current Outpatient Prescriptions:     atorvastatin (LIPITOR) 80 MG tablet, Take 1 tablet (80 mg total) by mouth once daily., Disp: 30 tablet, Rfl: 5    baclofen (LIORESAL) 10 MG tablet, Take 1 tablet (10 mg total) by mouth 4 (four) times daily., Disp: 120 tablet, Rfl: 5    benazepril (LOTENSIN) 20 MG tablet, Take 1 tablet (20 mg total) by mouth once daily., Disp: 30 tablet, Rfl: 11    clopidogrel (PLAVIX) 75 mg tablet, Take 1 tablet (75 mg total) by mouth once daily., Disp: 30 tablet, Rfl: 5    furosemide (LASIX) 20 MG tablet, Take 1 tablet (20 mg total) by mouth once daily., Disp: 30 tablet, Rfl: 5    insulin aspart (NOVOLOG) 100 unit/mL InPn pen, Inject 7 Units into the skin 3 (three) times daily with meals., Disp: 5 Box, Rfl: 3    insulin glargine (LANTUS) 100 unit/mL injection, Inject into the skin every evening., Disp: , Rfl:     phenytoin (DILANTIN) 100 MG ER capsule, Take 1 capsule (100 mg total) by mouth 3 (three) times daily., Disp: 90 capsule, Rfl: 5    tamsulosin (FLOMAX) 0.4 mg Cp24, Take 2 capsules (0.8 mg total) by mouth once daily. Increased dose., Disp: 60 capsule, Rfl: 5    Review of Systems    Objective:      Physical Exam   Constitutional: He appears well-developed.   HENT:   Head: Normocephalic.   Cardiovascular: Normal rate.    Pulmonary/Chest: Effort normal.   Abdominal: Soft.   Neurological: He is alert.   Skin: Skin is warm.     Psychiatric: He has a normal mood and affect.       Assessment:       1. Retention of urine        Plan:       Miguel was seen today for urinary retention.    Diagnoses and all orders for this visit:    Retention of urine  -     US Retroperitoneal Complete (Kidney and; Future  -     Cystoscopy; Future  -     Simple urodynamics        change Nunez every 4-6 weeks and we'll teach CIC when necessary

## 2017-06-02 ENCOUNTER — OFFICE VISIT (OUTPATIENT)
Dept: UROLOGY | Facility: CLINIC | Age: 62
End: 2017-06-02
Payer: COMMERCIAL

## 2017-06-02 VITALS — DIASTOLIC BLOOD PRESSURE: 73 MMHG | HEART RATE: 71 BPM | SYSTOLIC BLOOD PRESSURE: 133 MMHG

## 2017-06-02 DIAGNOSIS — R33.9 URINARY RETENTION: ICD-10-CM

## 2017-06-02 DIAGNOSIS — Z97.8 FOLEY CATHETER IN PLACE: ICD-10-CM

## 2017-06-02 DIAGNOSIS — R82.90 MALODOROUS URINE: Primary | ICD-10-CM

## 2017-06-02 PROCEDURE — 87088 URINE BACTERIA CULTURE: CPT

## 2017-06-02 PROCEDURE — 99213 OFFICE O/P EST LOW 20 MIN: CPT | Mod: S$GLB,,, | Performed by: PHYSICIAN ASSISTANT

## 2017-06-02 PROCEDURE — 87077 CULTURE AEROBIC IDENTIFY: CPT

## 2017-06-02 PROCEDURE — 99999 PR PBB SHADOW E&M-EST. PATIENT-LVL III: CPT | Mod: PBBFAC,,, | Performed by: PHYSICIAN ASSISTANT

## 2017-06-02 PROCEDURE — 87086 URINE CULTURE/COLONY COUNT: CPT

## 2017-06-02 PROCEDURE — 87186 SC STD MICRODIL/AGAR DIL: CPT

## 2017-06-02 NOTE — PROGRESS NOTES
CHIEF COMPLAINT:    Mr. Moore is a 61 y.o. male presenting for penile swelling and malodorous urine.   PRESENTING ILLNESS:    Miguel Moore is a 61 y.o. male who presents for penile swelling and malodorous urine.  He reports malodorous urine since last night.  He reports swelling of his penis and penile pain x 1 day. He has not taken anything for the pain.   He reports clear yellow urine draining from his catheter.    He was seen in clinic for urinary retention on 5/16/17.  He underwent a voiding trial in which he passed.  However he presented to the ED at [facility] on 5/22/17 to have sinclair catheter placed due to urinary retention.    He is taking flomax 0.8mg.   He was last seen in clinic by Dr. Chambers yesterday.  He is scheduled for suds/cysto later this month.   He reports having chills a few days ago.  He denies fever.      REVIEW OF SYSTEMS:     Constitutional: Negative for fever.  HENT: Negative for hearing loss.   Eyes: Negative for visual disturbance.   Respiratory: Negative for shortness of breath.   Cardiovascular: Negative for chest pain.   Gastrointestinal: Negative for nausea, vomiting, and constipation.   Genitourinary:  Negative for urgency, frequency, nocturia, incontinence, dysuria, hematuria, intermittency, hesitancy, and decreased urine volume.   Neurological: Negative for dizziness.   Hematological: Does bruise/bleed easily.   Psychiatric/Behavioral: Negative for confusion.       PATIENT HISTORY:    Past Medical History:   Diagnosis Date    Diabetes mellitus, type 2     Hypertension     Stroke        History reviewed. No pertinent surgical history.    Family History   Problem Relation Age of Onset    Diabetes Mother     Heart disease Mother     Hypertension Mother     Cataracts Mother     No Known Problems Father     Hypertension Sister     No Known Problems Brother     No Known Problems Daughter     No Known Problems Maternal Aunt     No Known Problems Maternal Uncle     No  Known Problems Paternal Aunt     No Known Problems Paternal Uncle     No Known Problems Maternal Grandmother     No Known Problems Maternal Grandfather     No Known Problems Paternal Grandmother     No Known Problems Paternal Grandfather     Amblyopia Neg Hx     Blindness Neg Hx     Cancer Neg Hx     Glaucoma Neg Hx     Macular degeneration Neg Hx     Retinal detachment Neg Hx     Strabismus Neg Hx     Stroke Neg Hx     Thyroid disease Neg Hx        Social History     Social History    Marital status: Single     Spouse name: N/A    Number of children: N/A    Years of education: N/A     Occupational History    disabled - former  - dozers, etc      Social History Main Topics    Smoking status: Never Smoker    Smokeless tobacco: Not on file    Alcohol use Not on file    Drug use: Unknown    Sexual activity: Not on file     Other Topics Concern    Not on file     Social History Narrative    No narrative on file       Allergies:  Penicillins    Medications:    Current Outpatient Prescriptions:     atorvastatin (LIPITOR) 80 MG tablet, Take 1 tablet (80 mg total) by mouth once daily., Disp: 30 tablet, Rfl: 5    baclofen (LIORESAL) 10 MG tablet, Take 1 tablet (10 mg total) by mouth 4 (four) times daily., Disp: 120 tablet, Rfl: 5    benazepril (LOTENSIN) 20 MG tablet, Take 1 tablet (20 mg total) by mouth once daily., Disp: 30 tablet, Rfl: 11    clopidogrel (PLAVIX) 75 mg tablet, Take 1 tablet (75 mg total) by mouth once daily., Disp: 30 tablet, Rfl: 5    doxycycline (VIBRAMYCIN) 100 MG Cap, Take 1 capsule (100 mg total) by mouth every 12 (twelve) hours. Take with food, Disp: 14 capsule, Rfl: 0    furosemide (LASIX) 20 MG tablet, Take 1 tablet (20 mg total) by mouth once daily., Disp: 30 tablet, Rfl: 5    insulin aspart (NOVOLOG) 100 unit/mL InPn pen, Inject 7 Units into the skin 3 (three) times daily with meals., Disp: 5 Box, Rfl: 3    insulin glargine (LANTUS) 100 unit/mL  injection, Inject into the skin every evening., Disp: , Rfl:     phenytoin (DILANTIN) 100 MG ER capsule, Take 1 capsule (100 mg total) by mouth 3 (three) times daily., Disp: 90 capsule, Rfl: 5    tamsulosin (FLOMAX) 0.4 mg Cp24, Take 2 capsules (0.8 mg total) by mouth once daily. Increased dose., Disp: 60 capsule, Rfl: 5    PHYSICAL EXAMINATION:    Constitutional: He appears well-developed and well-nourished.  He is in no apparent distress.    Eyes: No scleral icterus noted bilaterally.  No discharge noted bilaterally.    Cardiovascular: Normal rate.  No pitting edema noted in lower extremities bilaterally    Pulmonary/Chest: Effort normal. No respiratory distress.     Abdominal:  He exhibits no distension.      Lymphadenopathy:        Right: No supraclavicular adenopathy present.        Left: No supraclavicular adenopathy present.     Neurological: He is alert and oriented to person, place, and time.     Skin: Skin is warm and dry.     Psych: Cooperative with normal affect.    Genitourinary: The penis is uncircumcised with evidence of paraphimosis. Nunez catheter in place.  Yellow urine noted in leg bag.    Physical Exam      LABS:      No results found for: PSA, PSADIAG, PSATOTAL, PSAFREE, PSAFREEPCT    IMPRESSION:    Encounter Diagnoses   Name Primary?    Malodorous urine Yes    Urinary retention     Nunez catheter in place          PLAN:    Paraphimosis reduced in office.  Counseled patient on the cause of paraphimosis.  Instructed him to always pull foreskin back over the glans to its resting postion.  Urine culture.  Treatment based on urine sensitivity.  Follow up on 6/26 for urodynamic testing.       Paige Garcia PA-C

## 2017-06-05 ENCOUNTER — TELEPHONE (OUTPATIENT)
Dept: UROLOGY | Facility: CLINIC | Age: 62
End: 2017-06-05

## 2017-06-05 DIAGNOSIS — A49.9 BACTERIAL UTI: Primary | ICD-10-CM

## 2017-06-05 DIAGNOSIS — N39.0 BACTERIAL UTI: Primary | ICD-10-CM

## 2017-06-05 LAB — BACTERIA UR CULT: NORMAL

## 2017-06-05 RX ORDER — DOXYCYCLINE 100 MG/1
100 CAPSULE ORAL EVERY 12 HOURS
Qty: 14 CAPSULE | Refills: 0 | Status: SHIPPED | OUTPATIENT
Start: 2017-06-05 | End: 2017-06-12

## 2017-06-05 NOTE — TELEPHONE ENCOUNTER
Patient notified of his positive urine culture. Will treat with doxy as he is scheduled for SUDs this month.  He was instructed to wear protective clothing or sunscreen while on doxycycline and one week after he completes it.  He voiced understanding.

## 2017-06-06 ENCOUNTER — TELEPHONE (OUTPATIENT)
Dept: UROLOGY | Facility: CLINIC | Age: 62
End: 2017-06-06

## 2017-06-09 ENCOUNTER — INITIAL CONSULT (OUTPATIENT)
Dept: OPHTHALMOLOGY | Facility: CLINIC | Age: 62
End: 2017-06-09
Payer: COMMERCIAL

## 2017-06-09 DIAGNOSIS — I10 BENIGN ESSENTIAL HTN: ICD-10-CM

## 2017-06-09 DIAGNOSIS — E11.9 DM TYPE 2 WITHOUT RETINOPATHY: ICD-10-CM

## 2017-06-09 DIAGNOSIS — H25.13 NUCLEAR SCLEROSIS, BILATERAL: Primary | ICD-10-CM

## 2017-06-09 DIAGNOSIS — H26.9 CORTICAL CATARACT OF BOTH EYES: ICD-10-CM

## 2017-06-09 PROCEDURE — 99999 PR PBB SHADOW E&M-EST. PATIENT-LVL II: CPT | Mod: PBBFAC,,, | Performed by: OPHTHALMOLOGY

## 2017-06-09 PROCEDURE — 92136 OPHTHALMIC BIOMETRY: CPT | Mod: RT,S$GLB,, | Performed by: OPHTHALMOLOGY

## 2017-06-09 PROCEDURE — 92014 COMPRE OPH EXAM EST PT 1/>: CPT | Mod: S$GLB,,, | Performed by: OPHTHALMOLOGY

## 2017-06-09 NOTE — PROGRESS NOTES
Subjective:       Patient ID: Miguel Moore is a 61 y.o. male.    Chief Complaint: Cataract (Per. )    HPI  Review of Systems    Objective:      Physical Exam    Assessment:       1. Nuclear sclerosis, bilateral    2. Cortical cataract of both eyes    3. DM type 2 without retinopathy    4. Benign essential HTN        Plan:       Visually significant cataract OU -Pt. Wants Sx.     DM-No NPDR OU.  HTN-No retinopathy OU.        Pt to call to schedule CE OD 1st SN60WF 17.5,                                           OS 2nd SN60WF 18.5.  Control DM & HTN.

## 2017-06-09 NOTE — LETTER
June 9, 2017      Caroline Rogers, OD  [address]  [place] [address]           [facility]  [address]  [place] [address]  Phone: [phone]  Fax: [phone]          Patient: Miguel Moore   MR Number: [id number]   YOB: 1955   Date of Visit: 6/9/2017       Dear Dr. Caroline Rogers:    Thank you for referring Miguel Moore to me for evaluation. Attached you will find relevant portions of my assessment and plan of care.    If you have questions, please do not hesitate to call me. I look forward to following Miguel Moore along with you.    Sincerely,    Bob Tay MD    Enclosure  CC:  No Recipients    If you would like to receive this communication electronically, please contact [email or web address] or [phone] to request more information on [employer] Link access.    For providers and/or their staff who would like to refer a patient to [facility], please contact us through our one-stop-shop provider referral line, [facility], at [phone].    If you feel you have received this communication in error or would no longer like to receive these types of communications, please e-mail [email or web address]

## 2017-06-14 ENCOUNTER — TELEPHONE (OUTPATIENT)
Dept: REHABILITATION | Facility: HOSPITAL | Age: 62
End: 2017-06-14

## 2017-06-23 ENCOUNTER — CLINICAL SUPPORT (OUTPATIENT)
Dept: REHABILITATION | Facility: HOSPITAL | Age: 62
End: 2017-06-23
Attending: NEUROLOGICAL SURGERY
Payer: COMMERCIAL

## 2017-06-23 DIAGNOSIS — Z74.09 IMPAIRED FUNCTIONAL MOBILITY, BALANCE, GAIT, AND ENDURANCE: ICD-10-CM

## 2017-06-23 DIAGNOSIS — I69.359 HEMIPARESIS AFFECTING DOMINANT SIDE AS LATE EFFECT OF CEREBROVASCULAR ACCIDENT: Primary | ICD-10-CM

## 2017-06-23 DIAGNOSIS — M62.89 MUSCLE TIGHTNESS: ICD-10-CM

## 2017-06-23 PROCEDURE — G8979 MOBILITY GOAL STATUS: HCPCS | Mod: CK,PN

## 2017-06-23 PROCEDURE — G8978 MOBILITY CURRENT STATUS: HCPCS | Mod: CK,PN

## 2017-06-23 PROCEDURE — 97161 PT EVAL LOW COMPLEX 20 MIN: CPT | Mod: PN

## 2017-06-23 NOTE — PLAN OF CARE
Physical Therapy Evaluation    Name: Miguel Moore  Clinic Number: [id number]      Diagnosis:   Encounter Diagnosis   Name Primary?    Hemiparesis affecting dominant side as late effect of cerebrovascular accident Yes     Physician: Geovany Rucker MD  Treatment Orders: PT Eval and Treat    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    Stroke      Current Outpatient Prescriptions   Medication Sig    atorvastatin (LIPITOR) 80 MG tablet Take 1 tablet (80 mg total) by mouth once daily.    baclofen (LIORESAL) 10 MG tablet Take 1 tablet (10 mg total) by mouth 4 (four) times daily.    benazepril (LOTENSIN) 20 MG tablet Take 1 tablet (20 mg total) by mouth once daily.    clopidogrel (PLAVIX) 75 mg tablet Take 1 tablet (75 mg total) by mouth once daily.    furosemide (LASIX) 20 MG tablet Take 1 tablet (20 mg total) by mouth once daily.    insulin aspart (NOVOLOG) 100 unit/mL InPn pen Inject 7 Units into the skin 3 (three) times daily with meals.    insulin glargine (LANTUS) 100 unit/mL injection Inject into the skin every evening.    phenytoin (DILANTIN) 100 MG ER capsule Take 1 capsule (100 mg total) by mouth 3 (three) times daily.    tamsulosin (FLOMAX) 0.4 mg Cp24 Take 2 capsules (0.8 mg total) by mouth once daily. Increased dose.     No current facility-administered medications for this visit.      Review of patient's allergies indicates:   Allergen Reactions    Penicillins Hives     Precautions: DM, HTN, CVA    Evaluation Date: 6/23/2017  Visit # authorized: 25  Authorization period: 12/31/17      Luci Rivera is a 61 y.o. male that presents to [facility] outpatient clinic secondary to R sided hemiparesis. Pt is scheduled to get his urinary catheter removed on Monday. Pt reports that therapy was helping him a little bit.     Patient c/o: stiffness and flexibility - pt reports he does a lot of strengthening at  "home  Onset:: CVA about 2 years prior - was attending therapy at [facility] about 6 months   Pain Scale: none  Aggravating factors: n/a  Relieving factors: n/a  Previous treatment: at [facility] 6 months prior  Past surgical history: stend placement in groin  Functional deficits: difficulty walking, flexbility  Prior level of function: independent - has 2 wheeled walker and bedside commode -   Occupation: retired/disability,  - day to day basis working on improving functional mobility - independent with daily hygiene and household chores  Environment: 1 story home with 1 steps to enter, has SPC AD, lives with daughter  No cultural or spiritual barriers identified to treatment or learning.  Patient's goals: "improve flexibility and to walk better (more fluent)      Objective     Observation: maintenance of RUE in ADD/IR, clenched fist, pt in standing with decreased weight shift to the RLE    Posture Alignment :midline awareness - maintained weight shift to the L  Dominant hand:  left     ROM:   UPPER EXTREMITY--AROM/PROM  (R) UE: limited as follows: shoulder elevation to 90 degree passively   (L) UE: WFLs           RANGE OF MOTION--LOWER EXTREMITIES  (R) LE Hip: 90 degrees flexion, 0 degrees extension and 17 degrees abduction   Knee: 92 degrees flexion   Ankle: WNL passively    (L) LE: Hip: normal   Knee: normal   Ankle: normal    Lower Extremity Strength  Right LE  Left LE    Hip flexion:  4/5 Hip flexion: 5/5   Knee extension: 4-/5 Knee extension: 5/5   Knee flexion: 3/5 Knee flexion: 5/5   Ankle dorsiflexion:  2/5 Ankle dorsiflexion: 5/5   Ankle plantarflexion:  3/5 Ankle plantarflexion: 5/5   Hip abduction: 3+/5 Hip abduction: 4+/5   Hip adduction: 3/5 Hip adduction 5/5   Hip extension: 2/5 Hip extension: 4/5     Functional Strength:   -30 sec sit to stand: 2   -Heel Walking: unable to perform    -Toe Walking:  Unable to perform      Bed mobility: supervision required for sequencing of rolling to the R - " independent all others    Transfers: Modified Independent    Stairs: heavy assist from LUE - step to pattern to ascend and descend - descents    TU minute 7 seconds    WILD Assessment  1. Sitting to Standin - needs minimal aid to stand or stabilize  2. Standing Unsupported: 4 - able to stand safely 2 minutes without hold  3. Sitting Unsupported: 4 - able to sit safely and securely 2 minutes  4. Standing to Sittin - sit independently but has uncontrolled descent  5. Pivot Transfer: 3 - able to transfer safely with definite use of hands  6. Standing with Eyes Closed: 4 - albe to stand 10 seconds safely  7. Standing with Feet Together: 4 - able to place feet together independently and stand 1 minute safely  8. Reaching Forward with Outstretched Arm: 3 - can reach forward 12 cm/5 inches safely  9. Retrieving Object from Floor: 3 - able to pick slipper but needs supervision  10. Turning to Look Behind: 1 - needs supervision when turning  11. Turning 360 Degrees: 1 - needs close supervision or verbal cueing  12. Placing Alternate Foot on Step: 0 - needs assist to keep from falling/unable to try  13. Standing with One Foot in Front: 0 - Looses balance while stepping or standing  14. Standing on One Foot: 0 - unable to try or needs assistance to prevent fall  Total: 29  Maximum: 56 - HIGH FALL RISK      Coordination:   Rapid Alternating Movements: unable to perform RLE/RUE  Point to Point:    -Finger to Nose: WNL L; unable to perform RLE/RUE   -Heel to Shin: WNL L; unable to perform RLE/RUE    Flexibility:    Ely's test: positive BLE   Hamstring length 90/90: R = 55 degrees ; L = 70 degrees   Rex test: positive BLE    Sensation: grossly intact to light touch throughout BUE and BLE    Gait Assessment:   · AD used: SPC;Assistance: independent; Distance: 50'    · GAIT DEVIATIONS:   Miguel displays the following deviations with ambulation:    Impairments contributing to deviations: impaired motor control,  decreased muscle flexibility, poor balance, muscle weakness    Functional Limitations Reports - G Codes  Category: Mobility  Tool: FOTO  Score: 44% limitation  Current:  CK 40-60%  Goal:  CK 40-60%    PT Evaluation Completed? Yes  Discussed Plan of Care with patient: Yes      Assessment     This is a 61 y.o. male referred to outpatient physical therapy and presents with a medical diagnosis of R sided hemiparesis s/p CVA and demonstrates limitations as described in the problem list. Pt will benefit from physcial therapy services in order to maximize functional independence. The following goals were discussed with the patient and patient is in agreement with them as to be addressed in the treatment plan. Pt verbally understood the instructions as they were given and demonstrated proper form and technique during therapy. Pt was advised to perform these exercises free of pain, and to stop performing them if pain occurs.     History  Co-morbidities and personal factors that may impact the plan of care Examination  Body Structures and Functions, activity limitations and participation restrictions that may impact the plan of care Clinical Presentation   Decision Making/ Complexity Score   Co-morbidities:   CVA, HTN, DM      Personal Factors:   Independent with all ADL's lives with daughter   Body Regions: Trunk, BLE    Body Systems: NM, MSK    Activity limitations: unable to squat, lift, push, pull, amb    Participation Restrictions:   Unable to assist with heavy household chores, unable to resume gym routine   Stable uncomplicated   Low     Prognosis: fair    Anticipated barriers to physical therapy: transportation, chronic hemiparesis    Medical necessity is demonstrated by the following IMPAIRMENTS/PROBLEM LIST:   1) Impaired functional mobility/balance   2) LE weakness   3) Decreased flexibility   4) difficulty walking   5) Lack of HEP    GOALS: Short Term Goals:  4 weeks  1. Pt will demonstrate bed mobility  without PT assist for ease with bed mobility at home  2. Pt to increase B popliteal angle by > or = 10 degrees in order to improve flexibility and posture.   3. Increased MMT  to  3/5 dorsiflexion  to increase tolerance for ADL and work activities.  4. Pt will demonstrate improved TUG score by 3 seconds for ease with functional mobility  5. Pt to tolerate HEP to improve ROM and independence with ADL's      Long Term Goals: 8 weeks  1. Pt will demonstrate sit to stand with minimal use of LUE for ease with transfers  2 .Pt to increase B popliteal angle by > or = 20 degrees in order to improve flexibility and posture.   3. Increased MMT  To 4- in the RLE  to increase tolerance for ADL and work activities.  4. Pt will demonstrate 5 sit to stands on 30 seconds sit to stand test for improvements in endurance and strength  5. Pt to be Independent with HEP to improve ROM and independence with ADL's  6. Pt to improve TUG score by 5 seconds for ease with functional mobility  7. Pt will score <44% limitation on the FOTO for ease with return to participation within the community    Plan     Pt will be treated by physical therapy 1-2 times a week for 8 weeks for Pt Education, HEP, therapeutic exercises, neuromuscular re-education, joint mobilizations, modalities prn to achieve established goals. Miguel may at times be seen by a PTA as part of the Rehab Team. Cont PT for 8 weeks.     I certify the need for these services furnished under this plan of treatment and while under my care.______________________________ Physician/Referring Practitioner  Date of Signature      Gogo Katz, PT

## 2017-06-23 NOTE — PROGRESS NOTES
Physical Therapy Evaluation    Name: Miguel Moore  Clinic Number: [id number]      Diagnosis:   Encounter Diagnosis   Name Primary?    Hemiparesis affecting dominant side as late effect of cerebrovascular accident Yes     Physician: Geovany Rucker MD  Treatment Orders: PT Eval and Treat    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    Stroke      Current Outpatient Prescriptions   Medication Sig    atorvastatin (LIPITOR) 80 MG tablet Take 1 tablet (80 mg total) by mouth once daily.    baclofen (LIORESAL) 10 MG tablet Take 1 tablet (10 mg total) by mouth 4 (four) times daily.    benazepril (LOTENSIN) 20 MG tablet Take 1 tablet (20 mg total) by mouth once daily.    clopidogrel (PLAVIX) 75 mg tablet Take 1 tablet (75 mg total) by mouth once daily.    furosemide (LASIX) 20 MG tablet Take 1 tablet (20 mg total) by mouth once daily.    insulin aspart (NOVOLOG) 100 unit/mL InPn pen Inject 7 Units into the skin 3 (three) times daily with meals.    insulin glargine (LANTUS) 100 unit/mL injection Inject into the skin every evening.    phenytoin (DILANTIN) 100 MG ER capsule Take 1 capsule (100 mg total) by mouth 3 (three) times daily.    tamsulosin (FLOMAX) 0.4 mg Cp24 Take 2 capsules (0.8 mg total) by mouth once daily. Increased dose.     No current facility-administered medications for this visit.      Review of patient's allergies indicates:   Allergen Reactions    Penicillins Hives     Precautions: DM, HTN, CVA    Evaluation Date: 6/23/2017  Visit # authorized: 25  Authorization period: 12/31/17      Luci Rivera is a 61 y.o. male that presents to [facility] outpatient clinic secondary to R sided hemiparesis. Pt is scheduled to get his urinary catheter removed on Monday. Pt reports that therapy was helping him a little bit.     Patient c/o: stiffness and flexibility - pt reports he does a lot of strengthening at  "home  Onset:: CVA about 2 years prior - was attending therapy at [facility] about 6 months   Pain Scale: none  Aggravating factors: n/a  Relieving factors: n/a  Previous treatment: at [facility] 6 months prior  Past surgical history: stend placement in groin  Functional deficits: difficulty walking, flexbility  Prior level of function: independent - has 2 wheeled walker and bedside commode -   Occupation: retired/disability,  - day to day basis working on improving functional mobility - independent with daily hygiene and household chores  Environment: 1 story home with 1 steps to enter, has SPC AD, lives with daughter  No cultural or spiritual barriers identified to treatment or learning.  Patient's goals: "improve flexibility and to walk better (more fluent)      Objective     Observation: maintenance of RUE in ADD/IR, clenched fist, pt in standing with decreased weight shift to the RLE    Posture Alignment :midline awareness - maintained weight shift to the L  Dominant hand:  left     ROM:   UPPER EXTREMITY--AROM/PROM  (R) UE: limited as follows: shoulder elevation to 90 degree passively   (L) UE: WFLs           RANGE OF MOTION--LOWER EXTREMITIES  (R) LE Hip: 90 degrees flexion, 0 degrees extension and 17 degrees abduction   Knee: 92 degrees flexion   Ankle: WNL passively    (L) LE: Hip: normal   Knee: normal   Ankle: normal    Lower Extremity Strength  Right LE  Left LE    Hip flexion:  4/5 Hip flexion: 5/5   Knee extension: 4-/5 Knee extension: 5/5   Knee flexion: 3/5 Knee flexion: 5/5   Ankle dorsiflexion:  2/5 Ankle dorsiflexion: 5/5   Ankle plantarflexion:  3/5 Ankle plantarflexion: 5/5   Hip abduction: 3+/5 Hip abduction: 4+/5   Hip adduction: 3/5 Hip adduction 5/5   Hip extension: 2/5 Hip extension: 4/5     Functional Strength:   -30 sec sit to stand: 2   -Heel Walking: unable to perform    -Toe Walking:  Unable to perform      Bed mobility: supervision required for sequencing of rolling to the R - " independent all others    Transfers: Modified Independent    Stairs: heavy assist from LUE - step to pattern to ascend and descend - descents    TU minute 7 seconds    WILD Assessment  1. Sitting to Standin - needs minimal aid to stand or stabilize  2. Standing Unsupported: 4 - able to stand safely 2 minutes without hold  3. Sitting Unsupported: 4 - able to sit safely and securely 2 minutes  4. Standing to Sittin - sit independently but has uncontrolled descent  5. Pivot Transfer: 3 - able to transfer safely with definite use of hands  6. Standing with Eyes Closed: 4 - albe to stand 10 seconds safely  7. Standing with Feet Together: 4 - able to place feet together independently and stand 1 minute safely  8. Reaching Forward with Outstretched Arm: 3 - can reach forward 12 cm/5 inches safely  9. Retrieving Object from Floor: 3 - able to pick slipper but needs supervision  10. Turning to Look Behind: 1 - needs supervision when turning  11. Turning 360 Degrees: 1 - needs close supervision or verbal cueing  12. Placing Alternate Foot on Step: 0 - needs assist to keep from falling/unable to try  13. Standing with One Foot in Front: 0 - Looses balance while stepping or standing  14. Standing on One Foot: 0 - unable to try or needs assistance to prevent fall  Total: 29  Maximum: 56 - HIGH FALL RISK      Coordination:   Rapid Alternating Movements: unable to perform RLE/RUE  Point to Point:    -Finger to Nose: WNL L; unable to perform RLE/RUE   -Heel to Shin: WNL L; unable to perform RLE/RUE    Flexibility:    Ely's test: positive BLE   Hamstring length 90/90: R = 55 degrees ; L = 70 degrees   Rex test: positive BLE    Sensation: grossly intact to light touch throughout BUE and BLE    Gait Assessment:   · AD used: SPC;Assistance: independent; Distance: 50'    · GAIT DEVIATIONS:   Miguel displays the following deviations with ambulation:    Impairments contributing to deviations: impaired motor control,  decreased muscle flexibility, poor balance, muscle weakness    Functional Limitations Reports - G Codes  Category: Mobility  Tool: FOTO  Score: 44% limitation  Current:  CK 40-60%  Goal:  CK 40-60%    PT Evaluation Completed? Yes  Discussed Plan of Care with patient: Yes      Assessment     This is a 61 y.o. male referred to outpatient physical therapy and presents with a medical diagnosis of R sided hemiparesis s/p CVA and demonstrates limitations as described in the problem list. Pt will benefit from physcial therapy services in order to maximize functional independence. The following goals were discussed with the patient and patient is in agreement with them as to be addressed in the treatment plan. Pt verbally understood the instructions as they were given and demonstrated proper form and technique during therapy. Pt was advised to perform these exercises free of pain, and to stop performing them if pain occurs.     History  Co-morbidities and personal factors that may impact the plan of care Examination  Body Structures and Functions, activity limitations and participation restrictions that may impact the plan of care Clinical Presentation   Decision Making/ Complexity Score   Co-morbidities:   CVA, HTN, DM      Personal Factors:   Independent with all ADL's lives with daughter   Body Regions: Trunk, BLE    Body Systems: NM, MSK    Activity limitations: unable to squat, lift, push, pull, amb    Participation Restrictions:   Unable to assist with heavy household chores, unable to resume gym routine   Stable uncomplicated   Low     Prognosis: fair    Anticipated barriers to physical therapy: transportation, chronic hemiparesis    Medical necessity is demonstrated by the following IMPAIRMENTS/PROBLEM LIST:   1) Impaired functional mobility/balance   2) LE weakness   3) Decreased flexibility   4) difficulty walking   5) Lack of HEP    GOALS: Short Term Goals:  4 weeks  1. Pt will demonstrate bed mobility  without PT assist for ease with bed mobility at home  2. Pt to increase B popliteal angle by > or = 10 degrees in order to improve flexibility and posture.   3. Increased MMT  to  3/5 dorsiflexion  to increase tolerance for ADL and work activities.  4. Pt will demonstrate improved TUG score by 3 seconds for ease with functional mobility  5. Pt to tolerate HEP to improve ROM and independence with ADL's      Long Term Goals: 8 weeks  1. Pt will demonstrate sit to stand with minimal use of LUE for ease with transfers  2 .Pt to increase B popliteal angle by > or = 20 degrees in order to improve flexibility and posture.   3. Increased MMT  To 4- in the RLE  to increase tolerance for ADL and work activities.  4. Pt will demonstrate 5 sit to stands on 30 seconds sit to stand test for improvements in endurance and strength  5. Pt to be Independent with HEP to improve ROM and independence with ADL's  6. Pt to improve TUG score by 5 seconds for ease with functional mobility  7. Pt will score <44% limitation on the FOTO for ease with return to participation within the community    Plan     Pt will be treated by physical therapy 1-2 times a week for 8 weeks for Pt Education, HEP, therapeutic exercises, neuromuscular re-education, joint mobilizations, modalities prn to achieve established goals. Miguel may at times be seen by a PTA as part of the Rehab Team. Cont PT for 8 weeks.     I certify the need for these services furnished under this plan of treatment and while under my care.______________________________ Physician/Referring Practitioner  Date of Signature      Gogo Katz, PT

## 2017-06-26 ENCOUNTER — HOSPITAL ENCOUNTER (OUTPATIENT)
Dept: RADIOLOGY | Facility: HOSPITAL | Age: 62
Discharge: HOME | End: 2017-06-26
Attending: UROLOGY
Payer: COMMERCIAL

## 2017-06-26 ENCOUNTER — PROCEDURE VISIT (OUTPATIENT)
Dept: UROLOGY | Facility: CLINIC | Age: 62
End: 2017-06-26
Payer: COMMERCIAL

## 2017-06-26 VITALS
BODY MASS INDEX: 30.01 KG/M2 | WEIGHT: 198 LBS | TEMPERATURE: 98 F | SYSTOLIC BLOOD PRESSURE: 165 MMHG | HEIGHT: 68 IN | HEART RATE: 72 BPM | DIASTOLIC BLOOD PRESSURE: 95 MMHG

## 2017-06-26 DIAGNOSIS — R33.9 RETENTION OF URINE: ICD-10-CM

## 2017-06-26 PROCEDURE — 76770 US EXAM ABDO BACK WALL COMP: CPT | Mod: 26,,, | Performed by: RADIOLOGY

## 2017-06-26 PROCEDURE — 76770 US EXAM ABDO BACK WALL COMP: CPT | Mod: TC

## 2017-06-26 RX ORDER — SULFAMETHOXAZOLE AND TRIMETHOPRIM 800; 160 MG/1; MG/1
1 TABLET ORAL 2 TIMES DAILY
Qty: 20 TABLET | Refills: 2 | Status: SHIPPED | OUTPATIENT
Start: 2017-06-26 | End: 2017-07-26

## 2017-06-26 NOTE — PROCEDURES
Simple urodynamics  Date/Time: 6/26/2017 12:54 PM  Performed by: NICKIE AGUIAR JR  Authorized by: NICKIE AGUIAR JR   Preparation: Patient was prepped and draped in the usual sterile fashion.  Local anesthesia used: no    Anesthesia:  Local anesthesia used: no    Sedation:  Patient sedated: no  Patient tolerance: Patient tolerated the procedure well with no immediate complications         This office note has been dictated.  Procedure Date: 6/26/2017

## 2017-06-26 NOTE — PATIENT INSTRUCTIONS
SIMPLE URODYNAMIC STUDY (SUDS) & CYSTOSCOPY  UROLOGY CLINIC DISCHARGE INSTRUCTIONS    You have had a procedure that will require time to properly heal. Follow the instructions you have been given on how to care for yourself once you are home. Below is additional information to help in your recovery.    ACTIVITY  · There are no restrictions in activity. Start doing again the things you did before the procedure.  · You may experience a slight burning sensation. You may notice a small amount of blood in your urine. This will clear up within a day. Call the clinic if this continues beyond 48 hours.    DIET  · Continue your normal diet. You may eat the same foods you ate before your procedure.  · Drink plenty of fluids during the first 24-48 hours following your procedure.    MEDICATIONS  · Resume all other previous medications from your prescribing physician.  · Continue any pre=procedure antibiotics until they are all gone.    SIGNS AND SYMPTOMS TO REPORT TO THE DOCTOR  · Chills or fever greater than 101° F within 24 hours of procedure.  · Changes in urination, such as increased bleeding, foul smell, cloudy urine, or painful urination.  · Call your doctor with any questions or concerns.    For any emergency situation, call [phone] immediately or go to your nearest emergency room.    [facility]  [phone]      Instructed by_________________________________          Patient Signature___________________________________                                                                                                                                                                                             If any problems after hours or weekends, you may call [phone] and ask for the urology resident on call.

## 2017-07-03 ENCOUNTER — OFFICE VISIT (OUTPATIENT)
Dept: PODIATRY | Facility: CLINIC | Age: 62
End: 2017-07-03
Payer: COMMERCIAL

## 2017-07-03 VITALS — BODY MASS INDEX: 30.01 KG/M2 | WEIGHT: 198 LBS | HEIGHT: 68 IN

## 2017-07-03 DIAGNOSIS — L84 CORN OR CALLUS: ICD-10-CM

## 2017-07-03 DIAGNOSIS — R53.1 RIGHT SIDED WEAKNESS: ICD-10-CM

## 2017-07-03 DIAGNOSIS — E11.49 TYPE II DIABETES MELLITUS WITH NEUROLOGICAL MANIFESTATIONS: Primary | ICD-10-CM

## 2017-07-03 DIAGNOSIS — Z86.73 HISTORY OF STROKE: ICD-10-CM

## 2017-07-03 DIAGNOSIS — M20.41 HAMMER TOES OF BOTH FEET: ICD-10-CM

## 2017-07-03 DIAGNOSIS — M20.42 HAMMER TOES OF BOTH FEET: ICD-10-CM

## 2017-07-03 DIAGNOSIS — M21.6X9 PES CAVUS, UNSPECIFIED LATERALITY: ICD-10-CM

## 2017-07-03 DIAGNOSIS — B35.1 ONYCHOMYCOSIS DUE TO DERMATOPHYTE: ICD-10-CM

## 2017-07-03 DIAGNOSIS — R60.0 BILATERAL LOWER EXTREMITY EDEMA: ICD-10-CM

## 2017-07-03 PROCEDURE — 11721 DEBRIDE NAIL 6 OR MORE: CPT | Mod: 59,S$GLB,, | Performed by: PODIATRIST

## 2017-07-03 PROCEDURE — 99499 UNLISTED E&M SERVICE: CPT | Mod: S$GLB,,, | Performed by: PODIATRIST

## 2017-07-03 PROCEDURE — 99999 PR PBB SHADOW E&M-EST. PATIENT-LVL II: CPT | Mod: PBBFAC,,, | Performed by: PODIATRIST

## 2017-07-03 PROCEDURE — 11055 PARING/CUTG B9 HYPRKER LES 1: CPT | Mod: S$GLB,,, | Performed by: PODIATRIST

## 2017-07-03 NOTE — PROGRESS NOTES
Subjective:      Patient ID: Miguel Moore is a 61 y.o. male.    Chief Complaint: Diabetic Foot Exam (Pcp Dr. Raymond 05/08/2017); Diabetes Mellitus; and Nail Care    Miguel is a 61 y.o. male who presents to the clinic for evaluation and treatment of high risk feet. Miguel has a past medical history of Diabetes mellitus, type 2; Hypertension; Nuclear sclerosis of both eyes (6/9/2017); Stroke; and Urinary tract infection. The patient's chief complaint is long, thick toenails. Routine trimming of these help keep symptoms under control. This patient has documented high risk feet requiring routine maintenance secondary to diabetes mellitis and those secondary complications of diabetes, as mentioned. no other major complaints today. Had stroke 2 years ago and has undergone extensive therapy and has improved with getting around greatly. Using cane today.     PCP: Raciel Raymond MD    Date Last Seen by PCP:   Chief Complaint   Patient presents with    Diabetic Foot Exam     Pcp Dr. Raymond 05/08/2017    Diabetes Mellitus    Nail Care         Current shoe gear:   Tennis shoes         Hemoglobin A1C   Date Value Ref Range Status   03/21/2017 6.3 (H) 4.5 - 6.2 % Final     Comment:     According to ADA guidelines, hemoglobin A1C <7.0% represents  optimal control in non-pregnant diabetic patients.  Different  metrics may apply to specific populations.   Standards of Medical Care in Diabetes - 2016.  For the purpose of screening for the presence of diabetes:  <5.7%     Consistent with the absence of diabetes  5.7-6.4%  Consistent with increasing risk for diabetes   (prediabetes)  >or=6.5%  Consistent with diabetes  Currently no consensus exists for use of hemoglobin A1C  for diagnosis of diabetes for children.       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    Stroke     Urinary tract infection        History reviewed. No pertinent  surgical history.    Family History   Problem Relation Age of Onset    Diabetes Mother     Heart disease Mother     Hypertension Mother     Cataracts Mother     No Known Problems Father     Hypertension Sister     No Known Problems Brother     No Known Problems Daughter     No Known Problems Maternal Aunt     No Known Problems Maternal Uncle     No Known Problems Paternal Aunt     No Known Problems Paternal Uncle     No Known Problems Maternal Grandmother     No Known Problems Maternal Grandfather     No Known Problems Paternal Grandmother     No Known Problems Paternal Grandfather     Amblyopia Neg Hx     Blindness Neg Hx     Cancer Neg Hx     Glaucoma Neg Hx     Macular degeneration Neg Hx     Retinal detachment Neg Hx     Strabismus Neg Hx     Stroke Neg Hx     Thyroid disease Neg Hx        Social History     Social History    Marital status: Single     Spouse name: N/A    Number of children: N/A    Years of education: N/A     Occupational History    disabled - former  - dozers, etc      Social History Main Topics    Smoking status: Never Smoker    Smokeless tobacco: None    Alcohol use None    Drug use: Unknown    Sexual activity: Not Asked     Other Topics Concern    None     Social History Narrative    None       Current Outpatient Prescriptions   Medication Sig Dispense Refill    atorvastatin (LIPITOR) 80 MG tablet Take 1 tablet (80 mg total) by mouth once daily. 30 tablet 5    baclofen (LIORESAL) 10 MG tablet Take 1 tablet (10 mg total) by mouth 4 (four) times daily. 120 tablet 5    benazepril (LOTENSIN) 20 MG tablet Take 1 tablet (20 mg total) by mouth once daily. 30 tablet 11    clopidogrel (PLAVIX) 75 mg tablet Take 1 tablet (75 mg total) by mouth once daily. 30 tablet 5    furosemide (LASIX) 20 MG tablet Take 1 tablet (20 mg total) by mouth once daily. 30 tablet 5    insulin aspart (NOVOLOG) 100 unit/mL InPn pen Inject 7 Units into the skin 3  (three) times daily with meals. 5 Box 3    insulin glargine (LANTUS) 100 unit/mL injection Inject into the skin every evening.      phenytoin (DILANTIN) 100 MG ER capsule Take 1 capsule (100 mg total) by mouth 3 (three) times daily. 90 capsule 5    sulfamethoxazole-trimethoprim 800-160mg (BACTRIM DS) 800-160 mg Tab Take 1 tablet by mouth 2 (two) times daily. 20 tablet 2    tamsulosin (FLOMAX) 0.4 mg Cp24 Take 2 capsules (0.8 mg total) by mouth once daily. Increased dose. 60 capsule 5     No current facility-administered medications for this visit.        Review of patient's allergies indicates:   Allergen Reactions    Penicillins Hives       Review of Systems   Constitution: Negative for chills and fever.   Cardiovascular: Positive for leg swelling. Negative for chest pain and claudication.   Respiratory: Negative for cough and shortness of breath.    Skin: Positive for dry skin and nail changes.   Musculoskeletal: Positive for muscle weakness.   Gastrointestinal: Negative for nausea and vomiting.   Neurological: Positive for focal weakness (right sided), numbness and sensory change. Negative for paresthesias.   Psychiatric/Behavioral: Negative for altered mental status.           Objective:      Physical Exam   Constitutional: He is oriented to person, place, and time. He appears well-developed and well-nourished.   HENT:   Head: Normocephalic.   Cardiovascular: Intact distal pulses.    Pulses:       Dorsalis pedis pulses are 2+ on the right side, and 2+ on the left side.        Posterior tibial pulses are 1+ on the right side, and 1+ on the left side.   CRT < 3 sec to tips of toes. 2+ pitting edema noted to b/l LE. No vericosities noted to b/l LEs.      Pulmonary/Chest: No respiratory distress.   Musculoskeletal:   Gastrocnemius equinus noted to b/l ankles with decreased DF noted on exam. MMT 5/5 in DF/PF/Inv/Ev resistance with no reproduction of pain in any direction Right, 3/5 left. Passive range of motion  of ankle and pedal joints is painless. Adequate pedal joint ROM.      Neurological: He is alert and oriented to person, place, and time. He has normal strength. A sensory deficit is present.   Light touch, proprioception, and sharp/dull sensation are all intact bilaterally. Protective threshold with the [place]ienstein monofilament is intact bilaterally. Vibratory sensation diminished b/l distal foot.      Skin: Skin is warm, dry and intact. No erythema.   No open lesions, lacerations or wounds noted. Nails are thickened, elongated, discolored yellow/brown with subungual debris and brittleness to R 1-5 and L 1-5. Interdigital spaces clean, dry and intact b/l. No erythema noted to b/l foot. Skin texture thin, shiny, atrophic. Pedal hair absent. Toes cool to touch b/l.     Hyperkeratotic lesion noted to distal tip of left hallux just below elongated nail. Stable lesion without signs of open wounds, however pre-ulcerative.     Mild hyperpigmentation to skin of left foot and ankle consistent with hemosiderin deposits.      Psychiatric: He has a normal mood and affect. His behavior is normal. Judgment and thought content normal.   Vitals reviewed.            Assessment:       Encounter Diagnoses   Name Primary?    Type II diabetes mellitus with neurological manifestations Yes    History of stroke     Right sided weakness     Onychomycosis due to dermatophyte     Corn or callus     Bilateral lower extremity edema          Plan:       Miguel was seen today for diabetic foot exam, diabetes mellitus and nail care.    Diagnoses and all orders for this visit:    Type II diabetes mellitus with neurological manifestations  -     DIABETIC SHOES FOR HOME USE    History of stroke  -     DIABETIC SHOES FOR HOME USE    Right sided weakness  -     DIABETIC SHOES FOR HOME USE    Onychomycosis due to dermatophyte    Corn or callus  -     DIABETIC SHOES FOR HOME USE    Bilateral lower extremity edema  -     DIABETIC SHOES FOR  HOME USE      I counseled the patient on his conditions, their implications and medical management.        - Shoe inspection. Diabetic Foot Education. Patient reminded of the importance of good nutrition and blood sugar control to help prevent podiatric complications of diabetes. Patient instructed on proper foot hygeine. We discussed wearing proper shoe gear, daily foot inspections, never walking without protective shoe gear, never putting sharp instruments to feet, routine podiatric nail visits every 2-3 months.      - With patient's permission, nails were aggressively reduced and debrided x 10 to their soft tissue attachment mechanically and with electric , removing all offending nail and debris. Patient relates relief following the procedure. He will continue to monitor the areas daily, inspect his feet, wear protective shoe gear when ambulatory, moisturizer to maintain skin integrity and follow in this office in approximately 2-3 months, sooner p.r.n.    - The affected area was cleansed with an alcohol prep pad. Next, utilizing a No.15 scalpel, the hyperkeratotic tissues were trimmed from distal tip of left hallux, down to appropriate level of skin. Care was taken to remove any nucleated core from the center of the lesion. No pinpoint bleeding was encountered. The patient tolerated relief following this procedure.     - Discussed regular and routine moisturizer to skin of both feet to help improve dry skin. Advised to apply twice daily until resolution of symptoms. Avoid between toes.     - Discussed the use of compression stockings b/l to help control edema. Tubigrip applied today. Discussed proper and consistent elevation of lower extremities, above the level of the heart, while at rest, to help control/improve edema.     RTC 3 months, sooner PRN

## 2017-07-05 ENCOUNTER — CLINICAL SUPPORT (OUTPATIENT)
Dept: REHABILITATION | Facility: HOSPITAL | Age: 62
End: 2017-07-05
Attending: NEUROLOGICAL SURGERY
Payer: MEDICARE

## 2017-07-05 DIAGNOSIS — M62.89 MUSCLE TIGHTNESS: ICD-10-CM

## 2017-07-05 DIAGNOSIS — I69.359 HEMIPARESIS AFFECTING DOMINANT SIDE AS LATE EFFECT OF CEREBROVASCULAR ACCIDENT: Primary | ICD-10-CM

## 2017-07-05 DIAGNOSIS — R53.1 RIGHT SIDED WEAKNESS: Primary | ICD-10-CM

## 2017-07-05 DIAGNOSIS — Z74.09 IMPAIRED FUNCTIONAL MOBILITY, BALANCE, GAIT, AND ENDURANCE: ICD-10-CM

## 2017-07-05 PROCEDURE — 97110 THERAPEUTIC EXERCISES: CPT | Mod: PN

## 2017-07-05 PROCEDURE — 97112 NEUROMUSCULAR REEDUCATION: CPT | Mod: PN

## 2017-07-05 NOTE — PROGRESS NOTES
Name: Miguel Moore  Clinic Number: [id number]  Date of Treatment: 07/05/2017   Diagnosis:   Encounter Diagnoses   Name Primary?    Hemiparesis affecting dominant side as late effect of cerebrovascular accident Yes    Muscle tightness     Impaired functional mobility, balance, gait, and endurance        Time in: 1000  Time Out: 1105  Total Treatment Time: 65' ( 1:1 with PTA for duration of treatment session)     Visit # 2/ 25       Subjective:    Miguel reports that he is ready to work.   Pt states that he feels his biggest problem is his flexibility.  Patient reports their pain to be 0/10 on a 0-10 scale with 0 being no pain and 10 being the worst pain imaginable.    Objective    Miguel received therapeutic exercises to develop strength, endurance, ROM, flexibility, posture and core stabilization for 55 minutes including:     Nustep x 8'   Heel Props x 3'   SAQ 2' x 3'' hold   Bridges 2 x 10   HL hip adduction x 2'   BKFO RLE x 2'   SLR 2 x 10   Supine HSS with Strap 3 x 30''   Supine hip flexor stretch 2 x 30''   LAQ 10 x 3'' hold at end range      Patient participated in neuromuscular re-education activities to improve: Balance, Coordination and Posture for 10 minutes. The following activities were included:     Sit<>Stand 2 x 10   Gait training x 60' with SBQC and verbal cueing to increase step length     Written Home Exercises Provided: SAQ, bridges,  heel props, hamstring stretch , SLR , hip flexor stretch   Pt demo good understanding of the education provided. Miguel demonstrated good return demonstration of activities.     Assessment:   Mr. Rivera tolerated treatment session well.  Patient with decreased flexibility throughout the right lower extremity.  Patient with decreased neuromuscular control of the RLE requiring very minimal assist with hamstring stretches and RLE support with performance of bridges.   Pt will continue to benefit from skilled PT intervention. Medical Necessity is demonstrated  by:  Fall Risk, Continued inability to participate in vocational pursuits, Unable to participate fully in daily activities, Requires skilled supervision to complete and progress HEP and Weakness.    Patient is making fair progress towards established goals.       GOALS: Short Term Goals:  4 weeks  1. Pt will demonstrate bed mobility without PT assist for ease with bed mobility at home  2. Pt to increase B popliteal angle by > or = 10 degrees in order to improve flexibility and posture.   3. Increased MMT  to  3/5 dorsiflexion  to increase tolerance for ADL and work activities.  4. Pt will demonstrate improved TUG score by 3 seconds for ease with functional mobility  5. Pt to tolerate HEP to improve ROM and independence with ADL's      Plan:  Continue with established Plan of Care towards PT goals.     Kaia Parada, PTA

## 2017-07-07 ENCOUNTER — CLINICAL SUPPORT (OUTPATIENT)
Dept: REHABILITATION | Facility: HOSPITAL | Age: 62
End: 2017-07-07
Attending: NEUROLOGICAL SURGERY
Payer: MEDICARE

## 2017-07-07 DIAGNOSIS — Z74.09 IMPAIRED FUNCTIONAL MOBILITY, BALANCE, GAIT, AND ENDURANCE: ICD-10-CM

## 2017-07-07 DIAGNOSIS — M62.89 MUSCLE TIGHTNESS: ICD-10-CM

## 2017-07-07 PROCEDURE — 97110 THERAPEUTIC EXERCISES: CPT | Mod: PN

## 2017-07-07 PROCEDURE — 97112 NEUROMUSCULAR REEDUCATION: CPT | Mod: PN

## 2017-07-07 NOTE — PROGRESS NOTES
"Name: Miguel Moore  Clinic Number: [id number]  Date of Treatment: 07/07/2017   Diagnosis:   Encounter Diagnoses   Name Primary?    Muscle tightness     Impaired functional mobility, balance, gait, and endurance        Time in: 1300  Time Out: 1400  Total Treatment Time: 60' ( 1:1 with PT for duration of treatment session)     Visit # 3/ 25       Subjective:    Miguel reports that he is ready to work.  Pt states that he feels really tight today.  Patient reports their pain to be 0/10 on a 0-10 scale with 0 being no pain and 10 being the worst pain imaginable.    Objective    Miguel Rivera received therapeutic exercises to develop strength, endurance, ROM, flexibility, posture and core stabilization for 50 minutes including:     Nustep x 8'   Heel Props with quad sets x 3'   SAQ 2' x 3'' hold    Bridges 2 x 10   HL hip adduction x 2'   BKFO RLE x 2'   SLR 2 x 10   Supine HSS with Strap 3 x 30''   Supine hip flexor stretch 2 x 30''   LAQ 10 x 3'' hold at end range  +Step ups 4" 2x10    Patient participated in neuromuscular re-education activities to improve: Balance, Coordination and Posture for 10 minutes. The following activities were included:     Sit<>Stand 2 x 10 - changed to 1x10 today 2* time constraints  Gait training x 60' without SBQC and verbal cueing to increase step length and hip/knee flexion    Written Home Exercises Provided: sit <> stand using LUE on RLE for push off   Pt demo good understanding of the education provided. Miguel demonstrated good return demonstration of activities.     Assessment:   Mr. Rivera tolerated treatment session well.  Pt with significant increased tone and spasticity throughout RLE with some improvement with hamstring stretching  Patient continues to require minimal assistance with SLR and bridges due to decreased neuromuscular control and decreased strength of the RLE. Pt demonstrates improvement of sit <> stand technique after education on technique for push-off " emphasizing weight bearing through the RLE, and decreased reliance on LUE. Pt with difficulty completing step-ups, using compensatory circumduction to achieve clearance. Improvement upon cueing for increased hip and knee flexion. Pt will continue to benefit from skilled PT intervention. Medical Necessity is demonstrated by:  Fall Risk, Continued inability to participate in vocational pursuits, Unable to participate fully in daily activities, Requires skilled supervision to complete and progress HEP and Weakness.    Patient is making fair progress towards established goals.       GOALS: Short Term Goals:  4 weeks  1. Pt will demonstrate bed mobility without PT assist for ease with bed mobility at home  2. Pt to increase B popliteal angle by > or = 10 degrees in order to improve flexibility and posture.   3. Increased MMT  to  3/5 dorsiflexion  to increase tolerance for ADL and work activities.  4. Pt will demonstrate improved TUG score by 3 seconds for ease with functional mobility  5. Pt to tolerate HEP to improve ROM and independence with ADL's      Plan:  Continue with established Plan of Care towards PT goals.     Gogo Katz, PT

## 2017-07-10 ENCOUNTER — CLINICAL SUPPORT (OUTPATIENT)
Dept: REHABILITATION | Facility: HOSPITAL | Age: 62
End: 2017-07-10
Attending: NEUROLOGICAL SURGERY
Payer: MEDICARE

## 2017-07-10 DIAGNOSIS — I69.359 HEMIPARESIS AFFECTING DOMINANT SIDE AS LATE EFFECT OF CEREBROVASCULAR ACCIDENT: Primary | ICD-10-CM

## 2017-07-10 DIAGNOSIS — M62.89 MUSCLE TIGHTNESS: ICD-10-CM

## 2017-07-10 DIAGNOSIS — Z74.09 IMPAIRED FUNCTIONAL MOBILITY, BALANCE, GAIT, AND ENDURANCE: ICD-10-CM

## 2017-07-10 PROCEDURE — 97110 THERAPEUTIC EXERCISES: CPT | Mod: PN

## 2017-07-10 NOTE — PROGRESS NOTES
"Name: Miguel Moore  Clinic Number: [id number]  Date of Treatment: 07/10/2017   Diagnosis:   Encounter Diagnoses   Name Primary?    Hemiparesis affecting dominant side as late effect of cerebrovascular accident Yes    Muscle tightness     Impaired functional mobility, balance, gait, and endurance        Time in: 1000  Time Out: 1055  Total Treatment Time: 55' ( 1:1 with PT for 30 of treatment session)     Visit # 4/ 25       Subjective:    Miguel reports that he was a little sore after his last session but feels looser than before. Patient reports their pain to be 0/10 on a 0-10 scale with 0 being no pain and 10 being the worst pain imaginable.    Objective    Miguel presents to therapy with SBQC.     Miguel received therapeutic exercises to develop strength, endurance, ROM, flexibility, posture and core stabilization for 45 minutes including:     Nustep x 8'   Heel Props with quad sets x 3'   SAQ 2' x 3'' hold    Bridges 2 x 10   HL hip adduction x 2'   BKFO RLE x 2'   SLR 2 x 10   Supine HSS with Strap 3 x 30''   Supine hip flexor stretch 2 x 30''   LAQ 10 x 3'' hold at end range  Step ups 4" 2x10 ea LE    Patient participated in neuromuscular re-education activities to improve: Balance, Coordination and Posture for 10 minutes. The following activities were included:     Sit<>Stand 2 x 10 - LUE on RLE for push-off, YTB used for TC  Gait training x 60' without SBQC and verbal cueing to increase step length and hip/knee flexion    Written Home Exercises Provided: sit <> stand using LUE on RLE for push off   Pt demo good understanding of the education provided. Miguel demonstrated good return demonstration of activities.     Assessment:   Mr. Rivera tolerated treatment session well.  Pt with tightness throughout RLE that improved throughout treatment session with stretching and functional activities  Pt demonstrates good carryover effect with sit <> stand technique but has valgus collapse on RLE due to weak " glute med. Improvement upon VC/TC to push out against YTB. Pt with increased step length and decreased compensatory mechanism for clearance during ambulation today. Pt with posterior weight shift and LUE assistance to complete step ups due to decreased strength and decreased confidence to bear weight through RLE. Pt will continue to benefit from skilled PT intervention. Medical Necessity is demonstrated by:  Fall Risk, Continued inability to participate in vocational pursuits, Unable to participate fully in daily activities, Requires skilled supervision to complete and progress HEP and Weakness.    Patient is making fair progress towards established goals.       GOALS: Short Term Goals:  4 weeks  1. Pt will demonstrate bed mobility without PT assist for ease with bed mobility at home  2. Pt to increase B popliteal angle by > or = 10 degrees in order to improve flexibility and posture.   3. Increased MMT  to  3/5 dorsiflexion  to increase tolerance for ADL and work activities.  4. Pt will demonstrate improved TUG score by 3 seconds for ease with functional mobility  5. Pt to tolerate HEP to improve ROM and independence with ADL's      Plan:  Continue with established Plan of Care towards PT goals.     Gogo Katz, PT

## 2017-07-12 NOTE — PROCEDURES
DATE OF PROCEDURE:  06/26/2017    PREOPERATIVE DIAGNOSIS:  Urinary retention.    POSTOPERATIVE DIAGNOSES:  Urinary retention secondary to BPH.    OPERATIONS PERFORMED:  Cystoscopy, simple urodynamics.    PROCEDURE IN DETAIL:  A two-catheter CMG study was performed using subtractive   rectal pressure monitoring.  The patient entered the Urodynamic Suite with a   Nunez catheter draining pink urine.  A two-catheter CMG study was performed   using subtractive rectal pressure monitoring.  Sterile water was used as a   medium at 30 mL per minute.  There were no uninhibited bladder contractions.    There was no bladder sphincter dyssynergia.  First sensation was at 118 mL,   first desire at 167.  Total volume was 186 mL.  The patient then voided with the   detrusor pressure of 75 cm of water and a maximum flow rate of 17.8 mL per   second.  His residual was 120 mL.  Flexible cystoscopy was then performed.  The   anterior urethra was normal.  Prostatic urethra was 4.5 cm and had obstructing   lateral lobe tissue.  There was no evidence of enlarged median lobe.  There were   grade II trabeculations.  Both ureteral orifices were normal size, shape and   position.  There was evidence of catheter reaction.  There were no stones,   tumors, foreign bodies, or active infections noted.    IMPRESSION:  BPH with outlet obstruction.    RECOMMENDATIONS:  The patient voided after cystoscopy with a fairly good flow   and felt like he emptied.  I offered to replace catheter; however, he would like   to trial of voiding himself without the aid of the catheter.  The patient is   not a good candidate for CIC.  He will continue taking the Flomax and he will   return to see me in two weeks.  If he is unable to void, he will call me sooner   and we will replace the catheter.  He understands that if this happens at night,   he may need to go to the Emergency Room.  If he is unable to void or if he   maintains high residuals, then he would be a  candidate for a laser   prostatectomy.  The patient will call me if he has any problems whatsoever and I   will send him in a prescription for Bactrim DS.      RACHEL/SHAKEEL  dd: 06/26/2017 14:52:23 (CDT)  td: 06/26/2017 22:49:01 (CDT)  Doc ID   #[id number]  Job ID #[id number]    CC:

## 2017-07-17 ENCOUNTER — CLINICAL SUPPORT (OUTPATIENT)
Dept: REHABILITATION | Facility: HOSPITAL | Age: 62
End: 2017-07-17
Attending: NEUROLOGICAL SURGERY
Payer: MEDICARE

## 2017-07-17 DIAGNOSIS — M62.89 MUSCLE TIGHTNESS: ICD-10-CM

## 2017-07-17 DIAGNOSIS — I69.359 HEMIPARESIS AFFECTING DOMINANT SIDE AS LATE EFFECT OF CEREBROVASCULAR ACCIDENT: Primary | ICD-10-CM

## 2017-07-17 DIAGNOSIS — Z74.09 IMPAIRED FUNCTIONAL MOBILITY, BALANCE, GAIT, AND ENDURANCE: ICD-10-CM

## 2017-07-17 PROCEDURE — 97112 NEUROMUSCULAR REEDUCATION: CPT | Mod: PN

## 2017-07-17 PROCEDURE — 97110 THERAPEUTIC EXERCISES: CPT | Mod: PN

## 2017-07-17 NOTE — PROGRESS NOTES
"Name: Miguel Moore  Clinic Number: [id number]  Date of Treatment: 07/17/2017   Diagnosis:   Encounter Diagnoses   Name Primary?    Hemiparesis affecting dominant side as late effect of cerebrovascular accident Yes    Muscle tightness     Impaired functional mobility, balance, gait, and endurance        Time in: 1045  Time Out: 1147  Total Treatment Time:  62'  ( 1:1 with PTA for duration of treatment session)     Visit #  5/ 25       Subjective:    Miguel reports that he just a little slow today.  Patient reports their pain to be 0/10 on a 0-10 scale with 0 being no pain and 10 being the worst pain imaginable.  Pt reports compliance with HEP.     Objective    Miguel presents to therapy with SBQC.     Miguel received therapeutic exercises to develop strength, endurance, ROM, flexibility, posture and core stabilization for 35  minutes including:     Nustep x 8'   Heel Props with quad sets x 3'   SAQ 2' x 3'' hold    Bridges 2 x 10   HL hip adduction x 2'   BKFO RLE x 2'   SLR 2 x 10   Supine HSS with Strap 2 x 30''   Supine hip flexor stretch 2 x 30''   LAQ 10 x 3'' hold at end range    + Standing Heel Raises 1 x 10   + Standing Marches x 20   + Standing Hip abduction 2 x 10 ea LE   Step ups 4" 1 x 10 ea LE  + Lateral Step Ups 1 x 10 ea LE ( unable to achieve full TKE on RLE)   + Cone Taps 1 x 10 ea LE ( cueing for trunk compensation on the RLE)     + Mat Mobility sit<>supine Mod I, sit<>supine Mod I , Rolling L & R Mod I     Patient participated in neuromuscular re-education activities to improve: Balance, Coordination and Posture for 25 minutes. The following activities were included:     Sit<>Stand 2 x 10 - LUE on RLE for push-off, YTB used for TC  Gait training x 60' without SBQC and verbal cueing to increase step length and hip/knee flexion  + Lateral Kelly Step -Overs 1 x 10 ea LE   + Fwd Kelly step over 1 x 10 ea LE   Tandem walking 1 lap  Retro walking 1 lap      Written Home Exercises reviewed.   Pt " fran good understanding of the education provided. Miguel demonstrated good return demonstration of activities.     Assessment:   Mr. Rivera tolerated treatment session well.  Patient with good tolerance to progression of standing exercises, however easily experienced muscle fatigue.  Patient demonstrated hip and trunk compensatory patterns to achieve foot clearance over obstacles.  Patient with decreased neuromuscular control on the RLE.  Pt will continue to benefit from skilled PT intervention. Medical Necessity is demonstrated by:  Fall Risk, Continued inability to participate in vocational pursuits, Unable to participate fully in daily activities, Requires skilled supervision to complete and progress HEP and Weakness.    Patient is making fair progress towards established goals.       GOALS: Short Term Goals:  4 weeks  1. Pt will demonstrate bed mobility without PT assist for ease with bed mobility at home  2. Pt to increase B popliteal angle by > or = 10 degrees in order to improve flexibility and posture.   3. Increased MMT  to  3/5 dorsiflexion  to increase tolerance for ADL and work activities.  4. Pt will demonstrate improved TUG score by 3 seconds for ease with functional mobility  5. Pt to tolerate HEP to improve ROM and independence with ADL's      Plan:  Continue with established Plan of Care towards PT goals.     Kaia Parada, PTA

## 2017-07-18 ENCOUNTER — OFFICE VISIT (OUTPATIENT)
Dept: OTOLARYNGOLOGY | Facility: CLINIC | Age: 62
End: 2017-07-18
Payer: COMMERCIAL

## 2017-07-18 VITALS
DIASTOLIC BLOOD PRESSURE: 88 MMHG | BODY MASS INDEX: 29.83 KG/M2 | WEIGHT: 196.19 LBS | HEART RATE: 67 BPM | SYSTOLIC BLOOD PRESSURE: 167 MMHG

## 2017-07-18 DIAGNOSIS — H61.23 BILATERAL IMPACTED CERUMEN: ICD-10-CM

## 2017-07-18 DIAGNOSIS — H93.8X3 SENSATION OF FULLNESS IN BOTH EARS: Primary | ICD-10-CM

## 2017-07-18 PROCEDURE — 99999 PR PBB SHADOW E&M-EST. PATIENT-LVL III: CPT | Mod: PBBFAC,,, | Performed by: NURSE PRACTITIONER

## 2017-07-18 PROCEDURE — 69210 REMOVE IMPACTED EAR WAX UNI: CPT | Mod: S$GLB,,, | Performed by: NURSE PRACTITIONER

## 2017-07-18 PROCEDURE — 99203 OFFICE O/P NEW LOW 30 MIN: CPT | Mod: 25,S$GLB,, | Performed by: NURSE PRACTITIONER

## 2017-07-18 NOTE — PROGRESS NOTES
Subjective:       Patient ID: Miguel Moore is a 61 y.o. male.    Chief Complaint: Cerumen Impaction and Hearing Loss    Ear Fullness    There is pain in both ears. This is a new problem. The current episode started more than 1 month ago. The problem occurs constantly. The problem has been unchanged. There has been no fever. The patient is experiencing no pain. Associated symptoms include hearing loss. Pertinent negatives include no abdominal pain, coughing, diarrhea, ear discharge, headaches, neck pain, rash, rhinorrhea, sore throat or vomiting. He has tried nothing for the symptoms. There is no history of a chronic ear infection, hearing loss or a tympanostomy tube.        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    Past Surgical History: Patient has no past surgical history on file.    Social History: Patient reports that he has never smoked. He does not have any smokeless tobacco history on file.    Family History: family history includes Cataracts in his mother; Diabetes in his mother; Heart disease in his mother; Hypertension in his mother and sister; No Known Problems in his brother, daughter, father, maternal aunt, maternal grandfather, maternal grandmother, maternal uncle, paternal aunt, paternal grandfather, paternal grandmother, and paternal uncle.    Medications:   Current Outpatient Prescriptions   Medication Sig    atorvastatin (LIPITOR) 80 MG tablet Take 1 tablet (80 mg total) by mouth once daily.    baclofen (LIORESAL) 10 MG tablet Take 1 tablet (10 mg total) by mouth 4 (four) times daily.    benazepril (LOTENSIN) 20 MG tablet Take 1 tablet (20 mg total) by mouth once daily.    clopidogrel (PLAVIX) 75 mg tablet Take 1 tablet (75 mg total) by mouth once daily.    furosemide (LASIX) 20 MG tablet Take 1 tablet (20 mg total) by mouth once daily.    insulin  aspart (NOVOLOG) 100 unit/mL InPn pen Inject 7 Units into the skin 3 (three) times daily with meals.    insulin glargine (LANTUS) 100 unit/mL injection Inject into the skin every evening.    phenytoin (DILANTIN) 100 MG ER capsule Take 1 capsule (100 mg total) by mouth 3 (three) times daily.    sulfamethoxazole-trimethoprim 800-160mg (BACTRIM DS) 800-160 mg Tab Take 1 tablet by mouth 2 (two) times daily.    tamsulosin (FLOMAX) 0.4 mg Cp24 Take 2 capsules (0.8 mg total) by mouth once daily. Increased dose.     No current facility-administered medications for this visit.        Allergies: Patient is allergic to penicillins.    Review of Systems   Constitutional: Negative for activity change, fatigue, fever and unexpected weight change.   HENT: Positive for hearing loss. Negative for congestion, dental problem, ear discharge, ear pain, facial swelling, mouth sores, nosebleeds, postnasal drip, rhinorrhea, sinus pressure, sneezing, sore throat, tinnitus, trouble swallowing and voice change.    Eyes: Negative for pain and visual disturbance.   Respiratory: Negative for cough, choking, chest tightness, shortness of breath, wheezing and stridor.    Cardiovascular: Negative for chest pain.   Gastrointestinal: Negative for abdominal pain, diarrhea, nausea and vomiting.   Musculoskeletal: Negative for gait problem, neck pain and neck stiffness.   Skin: Negative for color change, rash and wound.   Allergic/Immunologic: Negative for environmental allergies.   Neurological: Negative for dizziness, seizures, syncope, facial asymmetry, speech difficulty, weakness, light-headedness, numbness and headaches.   Psychiatric/Behavioral: Negative for agitation, confusion and decreased concentration. The patient is not nervous/anxious.        Objective:       BP (!) 167/88 (BP Location: Right arm, Patient Position: Sitting, BP Method: Automatic)   Pulse 67   Wt 89 kg (196 lb 3.4 oz)   BMI 29.83 kg/m²     Physical Exam    Constitutional: He is oriented to person, place, and time. He appears well-developed and well-nourished.   HENT:   Head: Normocephalic and atraumatic. Not macrocephalic and not microcephalic. Head is without raccoon's eyes, without Jacobs's sign, without abrasion, without contusion, without laceration, without right periorbital erythema and without left periorbital erythema. Hair is normal.   Right Ear: Tympanic membrane, external ear and ear canal normal. No lacerations. No drainage, swelling or tenderness. No foreign bodies. No mastoid tenderness. Tympanic membrane is not injected, not scarred, not perforated, not erythematous, not retracted and not bulging. Tympanic membrane mobility is normal. No middle ear effusion. No hemotympanum. Decreased hearing is noted.   Left Ear: Tympanic membrane, external ear and ear canal normal. No lacerations. No drainage, swelling or tenderness. No foreign bodies. No mastoid tenderness. Tympanic membrane is not injected, not scarred, not perforated, not erythematous, not retracted and not bulging. Tympanic membrane mobility is normal.  No middle ear effusion. No hemotympanum. Decreased hearing is noted.   Nose: Nose normal. No mucosal edema, rhinorrhea, nose lacerations, sinus tenderness or nasal deformity.   Mouth/Throat: Uvula is midline.     PROCEDURE NOTE:  Ceruminosis is noted in both EACs.  Wax was removed by manual debridement and suctioning utilizing the assistance of binocular microscopy revealing EACs and TMs WNL. The patient tolerated this procedure without difficulty. The subjective decrease noted in hearing pre-cleaning was resolved post-cleaning.       Eyes: Conjunctivae, EOM and lids are normal. Pupils are equal, round, and reactive to light.   Neck: Trachea normal and normal range of motion. Neck supple. No spinous process tenderness and no muscular tenderness present. No neck rigidity. No edema, no erythema and normal range of motion present. No thyroid mass  and no thyromegaly present.   Pulmonary/Chest: Effort normal.   Abdominal: Soft.   Musculoskeletal: Normal range of motion.   Lymphadenopathy:        Head (right side): No submental, no submandibular, no tonsillar, no preauricular and no posterior auricular adenopathy present.        Head (left side): No submental, no submandibular, no tonsillar, no preauricular, no posterior auricular and no occipital adenopathy present.     He has no cervical adenopathy.   Neurological: He is alert and oriented to person, place, and time. No cranial nerve deficit or sensory deficit.   Skin: Skin is warm and dry.   Psychiatric: He has a normal mood and affect. His behavior is normal. Judgment and thought content normal.   Nursing note and vitals reviewed.      Assessment:       1. Sensation of fullness in both ears    2. Bilateral impacted cerumen        Plan:       Patient deferred baseline audio.  F/U with PCP as per schedule.  RTC in 1 year for routine ear cleaning and baseline audio or prn sooner if symptoms worsen.

## 2017-07-19 ENCOUNTER — CLINICAL SUPPORT (OUTPATIENT)
Dept: REHABILITATION | Facility: HOSPITAL | Age: 62
End: 2017-07-19
Attending: NEUROLOGICAL SURGERY
Payer: MEDICARE

## 2017-07-19 DIAGNOSIS — Z74.09 IMPAIRED FUNCTIONAL MOBILITY, BALANCE, GAIT, AND ENDURANCE: ICD-10-CM

## 2017-07-19 DIAGNOSIS — M62.89 MUSCLE TIGHTNESS: ICD-10-CM

## 2017-07-19 DIAGNOSIS — I69.359 HEMIPARESIS AFFECTING DOMINANT SIDE AS LATE EFFECT OF CEREBROVASCULAR ACCIDENT: Primary | ICD-10-CM

## 2017-07-19 PROCEDURE — 97112 NEUROMUSCULAR REEDUCATION: CPT | Mod: PN

## 2017-07-19 PROCEDURE — 97110 THERAPEUTIC EXERCISES: CPT | Mod: PN

## 2017-07-19 NOTE — PROGRESS NOTES
"Name: Miguel Moore  Clinic Number: [id number]  Date of Treatment: 07/19/2017   Diagnosis:   Encounter Diagnoses   Name Primary?    Muscle tightness     Impaired functional mobility, balance, gait, and endurance     Hemiparesis affecting dominant side as late effect of cerebrovascular accident Yes       Time in: 1100  Time Out: 1200  Total Treatment Time:  60'  ( 1:1 with PT for duration of treatment session)     Visit #  6/ 25       Subjective:    Miguel reports that he sore from the last treatment session from all the new stuff he did.  Patient reports their pain to be 0/10 on a 0-10 scale with 0 being no pain and 10 being the worst pain imaginable.  Pt reports compliance with HEP.     Objective    Miguel presents to therapy with SBQC.     Miguel received therapeutic exercises to develop strength, endurance, ROM, flexibility, posture and core stabilization for 35  minutes including:     Nustep x 8'   Heel Props with quad sets x 3'   SAQ 2' x 3'' hold    Bridges 2 x 10   HL hip adduction x 2'   BKFO RLE x 2'   SLR 2 x 10   Supine HSS with Strap 2 x 30''   Supine hip flexor stretch 2 x 30''   LAQ 10 x 3'' hold at end range    + Standing Heel Raises 1 x 10   + Standing Marches x 20   + Standing Hip abduction 2 x 10 ea LE   Step ups 4" 1 x 10 ea LE  + Lateral Step Ups 1 x 10 ea LE ( unable to achieve full TKE on RLE)   + Cone Taps 1 x 10 ea LE ( cueing for trunk compensation on the RLE)     + Mat Mobility sit<>supine Mod I, sit<>supine Mod I , Rolling L & R Mod I     Patient participated in neuromuscular re-education activities to improve: Balance, Coordination and Posture for 25 minutes. The following activities were included:     Sit<>Stand 2 x 10 - LUE on RLE for push-off, YTB used for TC  Gait training x 60' without SBQC and verbal cueing to increase step length and hip/knee flexion  + Lateral Kelly Step -Overs 1 x 10 ea LE   + Fwd Kelly step over 1 x 10 ea LE   Tandem walking 1 lap  Retro walking 1 " lap      Written Home Exercises reviewed.   Pt demo good understanding of the education provided. Miguel demonstrated good return demonstration of activities.     Assessment:   Mr. Rivera tolerated treatment session well. Pt demonstrates improved 3-point gait pattern with increased step length on RLE, requiring min VC/TC for increasing LLE step length due to decreased confidence in weight bearing through R side. Patient continues to demonstrate hip and trunk compensatory patterns to achieve foot clearance over obstacles. Improvement upon right hip flexor facilitation and tactile/verbal cueing to increase knee flexion. Pt with crouched posture during lateral step ups due to decreased quad and glute strength necessary to achieve stability on RLE. Pt will continue to benefit from skilled PT intervention. Medical Necessity is demonstrated by:  Fall Risk, Continued inability to participate in vocational pursuits, Unable to participate fully in daily activities, Requires skilled supervision to complete and progress HEP and Weakness.    Patient is making fair progress towards established goals.       GOALS: Short Term Goals:  4 weeks  1. Pt will demonstrate bed mobility without PT assist for ease with bed mobility at home  2. Pt to increase B popliteal angle by > or = 10 degrees in order to improve flexibility and posture.   3. Increased MMT  to  3/5 dorsiflexion  to increase tolerance for ADL and work activities.  4. Pt will demonstrate improved TUG score by 3 seconds for ease with functional mobility  5. Pt to tolerate HEP to improve ROM and independence with ADL's      Plan:  Continue with established Plan of Care towards PT goals.     Gogo Katz, PT

## 2017-07-20 ENCOUNTER — CLINICAL SUPPORT (OUTPATIENT)
Dept: REHABILITATION | Facility: HOSPITAL | Age: 62
End: 2017-07-20
Attending: NEUROLOGICAL SURGERY
Payer: MEDICARE

## 2017-07-20 DIAGNOSIS — R53.1 RIGHT SIDED WEAKNESS: ICD-10-CM

## 2017-07-20 DIAGNOSIS — I69.359 HEMIPARESIS AFFECTING DOMINANT SIDE AS LATE EFFECT OF CEREBROVASCULAR ACCIDENT: Primary | ICD-10-CM

## 2017-07-20 PROCEDURE — G8988 SELF CARE GOAL STATUS: HCPCS | Mod: CK,PN

## 2017-07-20 PROCEDURE — G8987 SELF CARE CURRENT STATUS: HCPCS | Mod: CL,PN

## 2017-07-20 PROCEDURE — 97165 OT EVAL LOW COMPLEX 30 MIN: CPT | Mod: PN

## 2017-07-20 NOTE — PROGRESS NOTES
Occupational Therapy Evaluation    Name: Miguel Moore  MRN: [id number]   Physician: Geovany Rucker MD     Diagnosis:   Encounter Diagnoses   Name Primary?    Hemiparesis affecting dominant side as late effect of cerebrovascular accident Yes    Right sided weakness         Onset date:  Year 2015    Date of service: 7/20/17  Start time: 2:10 pm  End time: 3:10 pm    Orders: Eval and Treat    Subjective:  Patient goals: To be able to move my right hand.    Chief Complaint:   Chief Complaint   Patient presents with    OT Initial Evaluation      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    Stroke     Urinary tract infection       Current Outpatient Prescriptions   Medication Sig    atorvastatin (LIPITOR) 80 MG tablet Take 1 tablet (80 mg total) by mouth once daily.    baclofen (LIORESAL) 10 MG tablet Take 1 tablet (10 mg total) by mouth 4 (four) times daily.    benazepril (LOTENSIN) 20 MG tablet Take 1 tablet (20 mg total) by mouth once daily.    clopidogrel (PLAVIX) 75 mg tablet Take 1 tablet (75 mg total) by mouth once daily.    furosemide (LASIX) 20 MG tablet Take 1 tablet (20 mg total) by mouth once daily.    insulin aspart (NOVOLOG) 100 unit/mL InPn pen Inject 7 Units into the skin 3 (three) times daily with meals.    insulin glargine (LANTUS) 100 unit/mL injection Inject into the skin every evening.    phenytoin (DILANTIN) 100 MG ER capsule Take 1 capsule (100 mg total) by mouth 3 (three) times daily.    sulfamethoxazole-trimethoprim 800-160mg (BACTRIM DS) 800-160 mg Tab Take 1 tablet by mouth 2 (two) times daily.    tamsulosin (FLOMAX) 0.4 mg Cp24 Take 2 capsules (0.8 mg total) by mouth once daily. Increased dose.     No current facility-administered medications for this visit.      Precautions: universal  Social Hx: lives with their daughter    DME: Small based Quad cane    Home access: 2 small steps, one  story home    Review of patient's allergies indicates:   Allergen Reactions    Penicillins Hives     Special Tests:  MRI:   Xrays:    Past treatment includes: OT  OP 6 months, in pt rehab prior to outpt    Precautions:  Pain: 5-6/10 with stretching right wrist/hand. 0/10 at rest. Pain established using the Numbers pain scale.   Pain location:finger/wrist joint  Pain description: tightness  Relieved by: stop stretching    Dominant hand: ambidextrous    Occupation/hobbies/homemaking:   Job description includes: not working since stroke  Driving status: not active    Objective  Cognitive Exam  Oriented: Person, Place, Time and Situation  Behaviors: Follows two-step commands  Follows Commands/attention: Follows two-step commands  Communication: understandable  Memory: n/t  Safety awareness/insight to disability: aware of his capabilities  Coping skills/emotional control: may be unrealistic to RUE potential    Visual/perceptual:   Right eye cataract, reading glasses    Physical Exam:    Postural examination/scapula alignment: Head forward and Affected scapula elevated  Joint integrity: Hard end feel  Skin integrity: intact  Edema: minimal in right hand    Palpation: slight subluxation of humeral head    Sensation: Miguel reports no numbness or tingling in UE's  Light touch:  intact  Sharp/Dull:  impaired fingers of right hand  Kinesthesia: unable to test due to pts limitation in AROM  Proprioception:  intact  Temperature:  Intact per pt report    Joint Evaluation AROM  PROM     Left Right Left Right   Shoulder flex 0-180 40    90   Shoulder Abd 0-180 20   90   Shoulder ER 0-90 10   40   Shoulder IR 0-90 40   45   Shoulder Extension 0-80 neutral   40   Shoulder Horizontal adduction 0-90 5   15   Elbow flex/ext 0-150 90/-70   110/-30   Wrist flex 0-80 0   30   Wrist ext 0-70 0   10   Supination 0-80 -45   15   Pronation 0-80 full   80     Fist: no active finger control    Comments: limited AROM of  right arm     Strength: n/a    Lateral Pinch strength: n/a  3 point pinch strength: n/a  Tip pinch strength:n/a    Fine motor coordination: n/a    Gross motor coordination:n/a    Tone:  Modified Anitra Scale:   3 Considerable increase in muscle tone, passive movement difficult    Comments:     Balance:   Static Sit normal  Dynamic sit:  normal  Static Stand n/t  Dynamic stand n/t    Comments:    Functional Status: currently, pt is able to clean his room, prepare food, shower, dress UB and LB with shoes tied prior to donning                                                    Functional Mobility:  Bed mobility: Mod I  Roll to left: Mod I  Roll to right: Mod I  Supine to sit: Mod I  Sit to supine: Mod I  Transfers to bed: Mod I  Transfers to toilet: Mod I using bedside commode  Car transfers: Mod I  Wheelchair mobility: n/a    ADL's:  Feeding: Mod I  Grooming: Mod I left hair  Hygiene: Mod I  UB Dressing: Mod I  LB Dressing: Mod I  Toileting: Mod I  Bathing: I    IADL's:  Homecare: Mod I  Keeps his room clean  Cooking: Mod I  Laundry: Mod I  Yard work: n/a  Use of telephone: I  Money management: I  Medication management: Mod I  Comments:     TODAY'S TREATMENT     Miguel educated in the importance of wgt bearing on right side.  Pt practiced laying on right side and supporting his arm/hand on table top or arm of chair while at home. Miguel understanding of instructions provided.      Treatment today also included: education re: the potential for change after 2 years post stroke    ASSESSMENT:  OT diagnosis: R hemiparesis with increased tone and decreased active control    Assessment  Miguel Moore is a 61 y.o. male with a medical diagnosis of   Encounter Diagnoses   Name Primary?    Hemiparesis affecting dominant side as late effect of cerebrovascular accident Yes    Right sided weakness     referred to occupational therapy for evaluate and treat. He presents with minimal active movement or use of  RUE.    Miguel can benefit from outpatient Occupational therapy and a home program to address the stated goals to improve impairments and functional limitations. Treatment will be directed at improve the following impairments. Rehab potential is poor.    PROBLEM LIST/IMPAIRMENTS:   decreased flexibility, decreased range of motion      GOALS:  Time frame: 8 weeks  Competence in HEP to increase proprioceptive input into RUE to reduce tone  Pt will demonstrate stretches in clinic for RUE with 90% accuracy  Pt will receive a resting hand splint for RUE.    G CODE TOOL: FOTO  Self care  Current Score  = 53 or 47% impaired   Goal at Discharge Score 57 or 43% impaired   Discharge status Score =  or % impaired     Score interpretation is as follows:     TEST SCORE  Modifier  Impairment Limitation Restriction    0%  CH  0 % impaired, limited or restricted    1-19%  CI  @ least 1% but less than 20% impaired, limited or restricted    20-39%  CJ  @ least 20%<40% impaired, limited or restricted    40-59%  CK  @ least 40%<60% impaired, limited or restricted    60-79%  CL  @ least 60% <80% impaired, limited or restricted    80-99%  CM  @ least 80%<100% impaired limited or restricted    100%  CN  100% impaired, limited or restricted     History Examination Decision Making Complexity Score   Occupational Profile:     Medical and Therapy History:   Brief  Pt has had 6 months of therapy post stroke, 2 years since CVA               Performance Deficits     Physical  Decreased AROM and passive motion of RUE, increased RUE tone    Cognitive intact    Psychosocial:  Pt seems well adjusted to his medical and functional status       FOTO score  62% limitation    Pt will benefit from education re: HEP for wgt bearing and stretching RUE to increase functional active motion  1 x week x 8 weeks    Pt provided with HEP this day    Rehab potential fair to poor LOW       PLAN:    Patient/caregiver understands and agrees with plan  of care.    Outpatient occupational therapy 1  times weekly for 8 weeks  to include: pt ed, hep, therapeutic exercises, therapeutic activity, ADLs,  neuromuscular re-education, joint mobilizations, modalities prn,     Will avoid:   .    Treatment Time: 60    I certify the need for these services furnished under this plan of treatment and while under my care.____________________________________ Physician/Referring _______________________Practitioner Date of Signature

## 2017-07-20 NOTE — PLAN OF CARE
Occupational Therapy Evaluation    Name: Miguel Moore  MRN: [id number]   Physician: Geovany Rucker MD     Diagnosis:   Encounter Diagnoses   Name Primary?    Hemiparesis affecting dominant side as late effect of cerebrovascular accident Yes    Right sided weakness         Onset date:  Year 2015    Date of service: 7/20/17  Start time: 2:10 pm  End time: 3:10 pm    Orders: Eval and Treat    Subjective:  Patient goals: To be able to move my right hand.    Chief Complaint:   Chief Complaint   Patient presents with    OT Initial Evaluation      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    Stroke     Urinary tract infection       Current Outpatient Prescriptions   Medication Sig    atorvastatin (LIPITOR) 80 MG tablet Take 1 tablet (80 mg total) by mouth once daily.    baclofen (LIORESAL) 10 MG tablet Take 1 tablet (10 mg total) by mouth 4 (four) times daily.    benazepril (LOTENSIN) 20 MG tablet Take 1 tablet (20 mg total) by mouth once daily.    clopidogrel (PLAVIX) 75 mg tablet Take 1 tablet (75 mg total) by mouth once daily.    furosemide (LASIX) 20 MG tablet Take 1 tablet (20 mg total) by mouth once daily.    insulin aspart (NOVOLOG) 100 unit/mL InPn pen Inject 7 Units into the skin 3 (three) times daily with meals.    insulin glargine (LANTUS) 100 unit/mL injection Inject into the skin every evening.    phenytoin (DILANTIN) 100 MG ER capsule Take 1 capsule (100 mg total) by mouth 3 (three) times daily.    sulfamethoxazole-trimethoprim 800-160mg (BACTRIM DS) 800-160 mg Tab Take 1 tablet by mouth 2 (two) times daily.    tamsulosin (FLOMAX) 0.4 mg Cp24 Take 2 capsules (0.8 mg total) by mouth once daily. Increased dose.     No current facility-administered medications for this visit.      Precautions: universal  Social Hx: lives with their daughter    DME: Small based Quad cane    Home access: 2 small steps, one  story home    Review of patient's allergies indicates:   Allergen Reactions    Penicillins Hives     Special Tests:  MRI:   Xrays:    Past treatment includes: OT  OP 6 months, in pt rehab prior to outpt    Precautions:  Pain: 5-6/10 with stretching right wrist/hand. 0/10 at rest. Pain established using the Numbers pain scale.   Pain location:finger/wrist joint  Pain description: tightness  Relieved by: stop stretching    Dominant hand: ambidextrous    Occupation/hobbies/homemaking:   Job description includes: not working since stroke  Driving status: not active    Objective  Cognitive Exam  Oriented: Person, Place, Time and Situation  Behaviors: Follows two-step commands  Follows Commands/attention: Follows two-step commands  Communication: understandable  Memory: n/t  Safety awareness/insight to disability: aware of his capabilities  Coping skills/emotional control: may be unrealistic to RUE potential    Visual/perceptual:   Right eye cataract, reading glasses    Physical Exam:    Postural examination/scapula alignment: Head forward and Affected scapula elevated  Joint integrity: Hard end feel  Skin integrity: intact  Edema: minimal in right hand    Palpation: slight subluxation of humeral head    Sensation: Miguel reports no numbness or tingling in UE's  Light touch:  intact  Sharp/Dull:  impaired fingers of right hand  Kinesthesia: unable to test due to pts limitation in AROM  Proprioception:  intact  Temperature:  Intact per pt report    Joint Evaluation AROM  PROM     Left Right Left Right   Shoulder flex 0-180 40    90   Shoulder Abd 0-180 20   90   Shoulder ER 0-90 10   40   Shoulder IR 0-90 40   45   Shoulder Extension 0-80 neutral   40   Shoulder Horizontal adduction 0-90 5   15   Elbow flex/ext 0-150 90/-70   110/-30   Wrist flex 0-80 0   30   Wrist ext 0-70 0   10   Supination 0-80 -45   15   Pronation 0-80 full   80     Fist: no active finger control    Comments: limited AROM of  right arm     Strength: n/a    Lateral Pinch strength: n/a  3 point pinch strength: n/a  Tip pinch strength:n/a    Fine motor coordination: n/a    Gross motor coordination:n/a    Tone:  Modified Anitra Scale:   3 Considerable increase in muscle tone, passive movement difficult    Comments:     Balance:   Static Sit normal  Dynamic sit:  normal  Static Stand n/t  Dynamic stand n/t    Comments:    Functional Status: currently, pt is able to clean his room, prepare food, shower, dress UB and LB with shoes tied prior to donning                                                    Functional Mobility:  Bed mobility: Mod I  Roll to left: Mod I  Roll to right: Mod I  Supine to sit: Mod I  Sit to supine: Mod I  Transfers to bed: Mod I  Transfers to toilet: Mod I using bedside commode  Car transfers: Mod I  Wheelchair mobility: n/a    ADL's:  Feeding: Mod I  Grooming: Mod I left hair  Hygiene: Mod I  UB Dressing: Mod I  LB Dressing: Mod I  Toileting: Mod I  Bathing: I    IADL's:  Homecare: Mod I  Keeps his room clean  Cooking: Mod I  Laundry: Mod I  Yard work: n/a  Use of telephone: I  Money management: I  Medication management: Mod I  Comments:     TODAY'S TREATMENT     Miguel educated in the importance of wgt bearing on right side.  Pt practiced laying on right side and supporting his arm/hand on table top or arm of chair while at home. Miguel understanding of instructions provided.      Treatment today also included: education re: the potential for change after 2 years post stroke    ASSESSMENT:  OT diagnosis: R hemiparesis with increased tone and decreased active control    Assessment  Miguel Moore is a 61 y.o. male with a medical diagnosis of   Encounter Diagnoses   Name Primary?    Hemiparesis affecting dominant side as late effect of cerebrovascular accident Yes    Right sided weakness     referred to occupational therapy for evaluate and treat. He presents with minimal active movement or use of  RUE.    Miguel can benefit from outpatient Occupational therapy and a home program to address the stated goals to improve impairments and functional limitations. Treatment will be directed at improve the following impairments. Rehab potential is poor.    PROBLEM LIST/IMPAIRMENTS:   decreased flexibility, decreased range of motion      GOALS:  Time frame: 8 weeks  Competence in HEP to increase proprioceptive input into RUE to reduce tone  Pt will demonstrate stretches in clinic for RUE with 90% accuracy  Pt will receive a resting hand splint for RUE.    G CODE TOOL: FOTO  Self care  Current Score  = 38 or 62% impaired   Goal at Discharge Score 42 or 58% impaired   Discharge status Score =  or % impaired     Score interpretation is as follows:     TEST SCORE  Modifier  Impairment Limitation Restriction    0%  CH  0 % impaired, limited or restricted    1-19%  CI  @ least 1% but less than 20% impaired, limited or restricted    20-39%  CJ  @ least 20%<40% impaired, limited or restricted    40-59%  CK  @ least 40%<60% impaired, limited or restricted    60-79%  CL  @ least 60% <80% impaired, limited or restricted    80-99%  CM  @ least 80%<100% impaired limited or restricted    100%  CN  100% impaired, limited or restricted     History Examination Decision Making Complexity Score   Occupational Profile:     Medical and Therapy History:   Brief  Pt has had 6 months of therapy post stroke, 2 years since CVA               Performance Deficits     Physical  Decreased AROM and passive motion of RUE, increased RUE tone    Cognitive intact    Psychosocial:  Pt seems well adjusted to his medical and functional status       FOTO score  62% limitation    Pt will benefit from education re: HEP for wgt bearing and stretching RUE to increase functional active motion  1 x week x 8 weeks    Pt provided with HEP this day    Rehab potential fair to poor LOW       PLAN:    Patient/caregiver understands and agrees with plan  of care.    Outpatient occupational therapy 1  times weekly for 8 weeks  to include: pt ed, hep, therapeutic exercises, therapeutic activity, ADLs,  neuromuscular re-education, joint mobilizations, modalities prn,     Will avoid:   .    Treatment Time: 60    I certify the need for these services furnished under this plan of treatment and while under my care.____________________________________ Physician/Referring _______________________Practitioner Date of Signature

## 2017-07-24 NOTE — PATIENT INSTRUCTIONS
Patient deferred baseline audio.  F/U with PCP as per schedule.  RTC in 1 year for routine ear cleaning and baseline audio or prn sooner if symptoms worsen.

## 2017-07-26 ENCOUNTER — CLINICAL SUPPORT (OUTPATIENT)
Dept: REHABILITATION | Facility: HOSPITAL | Age: 62
End: 2017-07-26
Attending: NEUROLOGICAL SURGERY
Payer: MEDICARE

## 2017-07-26 DIAGNOSIS — Z74.09 IMPAIRED FUNCTIONAL MOBILITY, BALANCE, GAIT, AND ENDURANCE: ICD-10-CM

## 2017-07-26 DIAGNOSIS — M62.89 MUSCLE TIGHTNESS: ICD-10-CM

## 2017-07-26 DIAGNOSIS — I69.359 HEMIPARESIS AFFECTING DOMINANT SIDE AS LATE EFFECT OF CEREBROVASCULAR ACCIDENT: Primary | ICD-10-CM

## 2017-07-26 DIAGNOSIS — R53.1 RIGHT SIDED WEAKNESS: ICD-10-CM

## 2017-07-26 PROCEDURE — 97110 THERAPEUTIC EXERCISES: CPT | Mod: PN

## 2017-07-26 PROCEDURE — 97112 NEUROMUSCULAR REEDUCATION: CPT | Mod: PN

## 2017-07-26 NOTE — PROGRESS NOTES
"Name: Miguel Moore  Clinic Number: [id number]  Date of Treatment: 07/26/2017   Diagnosis:   Encounter Diagnoses   Name Primary?    Hemiparesis affecting dominant side as late effect of cerebrovascular accident Yes    Right sided weakness     Muscle tightness     Impaired functional mobility, balance, gait, and endurance        Time in: 1055  Time Out: 1153  Total Treatment Time:  58'  ( 1:1 with PTA for duration of treatment session)     Visit #  7/ 25       Subjective:    Miguel reports that he sore from the last treatment session from all the new stuff he did.  Patient reports their pain to be 0/10 on a 0-10 scale with 0 being no pain and 10 being the worst pain imaginable.  Pt reports compliance with HEP.     Objective    Miguel presents to therapy with SBQC.     Miguel received therapeutic exercises to develop strength, endurance, ROM, flexibility, posture and core stabilization for 35  minutes including:     Nustep x 8'   Heel Props with quad sets x 3'   SAQ 2' x 3'' hold    Bridges 2 x 10   HL hip adduction x 2'   BKFO RLE x 2'   SLR 2 x 10   Supine HSS with Strap 2 x 30''   Supine hip flexor stretch 2 x 30''   LAQ 10 x 3'' hold at end range    Standing Heel Raises 1 x 10   Standing Marches x 20   Standing Hip abduction 2 x 10 ea LE   Step ups 4" 1 x 15 ea LE   Lateral Step Ups 1 x 10 ea LE ( unable to achieve full TKE on RLE)   Cone Taps 1 x 10 ea LE ( cueing for trunk compensation on the RLE)     + Mat Mobility sit<>supine Mod I, sit<>supine Mod I , Rolling L & R Mod I     Patient participated in neuromuscular re-education activities to improve: Balance, Coordination and Posture for 25 minutes. The following activities were included:     Sit<>Stand 2 x 10 - LUE on RLE for push-off, YTB used for TC  Gait training x 60' without SBQC and verbal cueing to increase step length and hip/knee flexion   Lateral Kelly Step -Overs 1 x 10 ea LE   Fwd Kelly step over 1 x 10 ea LE   Tandem walking 1 lap  Retro " walking 4 lap  Fwd [place] with focus on exaggerated step lengths x 5 laps     Written Home Exercises reviewed.   Pt demo good understanding of the education provided. Miguel demonstrated good return demonstration of activities.     Assessment:   Mr. Rivera tolerated treatment session well. Patient with good tolerance to initiation of gait training in the parallel bars demonstrating improvements in gait pattern with use of the SBQC.  Patient with muscle fatigue easily achieved requiring frequent seated rest breaks. Pt will continue to benefit from skilled PT intervention. Medical Necessity is demonstrated by:  Fall Risk, Continued inability to participate in vocational pursuits, Unable to participate fully in daily activities, Requires skilled supervision to complete and progress HEP and Weakness.    Patient is making fair progress towards established goals.       GOALS: Short Term Goals:  4 weeks  1. Pt will demonstrate bed mobility without PT assist for ease with bed mobility at home  2. Pt to increase B popliteal angle by > or = 10 degrees in order to improve flexibility and posture.   3. Increased MMT  to  3/5 dorsiflexion  to increase tolerance for ADL and work activities.  4. Pt will demonstrate improved TUG score by 3 seconds for ease with functional mobility  5. Pt to tolerate HEP to improve ROM and independence with ADL's      Plan:  Continue with established Plan of Care towards PT goals.     Kaia Parada, PTA

## 2017-08-01 NOTE — PROGRESS NOTES
This note was created by combination of typed  and Dragon dictation.  Transcription errors may be present.  If there are any questions, please contact me.    Assessment & Plan  DM type 2 without retinopathy-nonfasting but difficult to get here so I'll check nonfasting labs CMP, A1c, lipid.  Taking NovoLog 7 units with meals, not taking Lantus.  -     Comprehensive metabolic panel; Future; Expected date: 08/02/2017  -     Lipid panel; Future; Expected date: 08/02/2017  -     Hemoglobin A1c; Future; Expected date: 08/02/2017    Hyperlipidemia, unspecified hyperlipidemia type-nonfasting, check lipid profile, on statin    Benign essential HTN-she is agreeable for referral to digital hypertension or gram, submitted.  Stable today.  -     NURSING COMMUNICATION: Create [facility] Account  -     Hypertension Digital [facility] ([facility]) Enrollment Order  -     Hypertension Digital Medicine (HDMP): Assign Onboarding Questionnaires    Need for hepatitis C screening test  -     Hepatitis C antibody; Future; Expected date: 08/02/2017    CKD stage 3 secondary to diabetes-handout regarding chronic kidney diet.  Check PTH, vitamin D, phosphorus.  Rationale discussed.  -     Phosphorus; Future; Expected date: 08/02/2017  -     Vitamin D; Future; Expected date: 08/02/2017  -     PTH, intact; Future; Expected date: 08/02/2017  -     CBC auto differential; Future; Expected date: 08/02/2017    Screening for colon cancer-transportation is an issue in regards to colonoscopy so check home guaiac  -     Occult blood x 1, stool; Future; Expected date: 08/02/2017  -     Occult blood x 1, stool; Future; Expected date: 08/02/2017  -     Occult blood x 1, stool; Future; Expected date: 08/02/2017        There are no discontinued medications.    Follow-up: No Follow-up on file. plan for follow-up 6 months.  In the interim he is followed by neurology.      =================================================================      Chief Complaint    Patient presents with    Hypertension    Hyperlipidemia    Diabetes       HPI  Miguel is a 61 y.o. male, last appointment with this clinic was 5/8/2017.    Last seen 5/2017  DM with neuropathy - diminished vibriception; on novolog only, had stopped lantus previously; a1c 6.3.  Currently 7 units with meals.  Cataracts seen by optometry since OV.  Hx of CVA with R sided hemiplegia  Seizure disorder after CVA, dilantin  Hyperlipidemia started on atorvastatin last OV  HTN benazepril, increased dose previously.  CKD  Peripheral edema, lasix, taking every other day perhaps.   Microcytosis, suspect hemoglobinopathy  BPH on flomax; last OV - urinary retention. Increased the flomax. Plan was cystoscopy and renal US.  6/26/2017 renal US mod prostatomegaly.  Impacted cerumen, ENT    Needs fasting labs (lipid), HCV; already referred for colonoscopy.    Requesting special transportation for [place] Patel.  He filled out the form, but next step is for him to submit and they'll send me more in depth paperwork.  Issues with endurance getting to the bus stop, usually travels alone.  Ambulates with a cane.    nonfasting - ate breakfast - oatmeal, toast, water. Turkey sausage.  However difficult for him to come in so i'll get nonfasting.    Transportation issue for colonoscopy.     Patient Care Team:  Raciel Raymond MD as PCP - General (Internal Medicine)    Patient Active Problem List    Diagnosis Date Noted    Muscle tightness 06/23/2017    Impaired functional mobility, balance, gait, and endurance 06/23/2017    Nuclear sclerosis of both eyes 06/09/2017    Cortical cataract of both eyes 06/09/2017    DM type 2 without retinopathy 06/09/2017    Hemiparesis affecting dominant side as late effect of cerebrovascular accident 05/30/2017    Microcytosis 03/22/2017     Seen on admission as well 2015; normal Hb, low MCV.  Normal RDW      CKD stage 3 secondary to diabetes 03/22/2017    Benign essential HTN 03/21/2017     Hyperlipidemia 03/21/2017    Controlled type 2 diabetes mellitus with stage 3 chronic kidney disease, with long-term current use of insulin 03/21/2017    Benign non-nodular prostatic hyperplasia without lower urinary tract symptoms 03/21/2017    Seizure disorder as sequela of cerebrovascular accident 03/21/2017    Hemiplegia and hemiparesis following cerebral infarction affecting right dominant side 03/21/2017    History of stroke with residual effects 01/13/2015 1/2015 Carotid Ultrasound showed no hemodynamic significant stenosis.  1/2015: TTE showed EF > 55%, normal diastolic filling, and negative bubble study.  1/2015: MRI Brain:Focal early subacute infarct along the deep cerebral white matter in the left periventricular corona radiata without evidence of acute bleed. Moderate dilation of the lateral ventricles without focal abnormal signal abnormality, this may represent chronic diffuse cerebral white matter atrophy, normal-pressure hydrocephalus is not excluded.  1/2015: MRA Brain: Mild to moderate (50-60% narrowing) of the M1 segment of the left MCA.   1/2015: CTA of head and neck: No hemodynamically significant lesions are observed vessels of the neck. There is moderate stenoses in the cavernous segment of the left internal carotid artery, the M1 segment of the left middle cerebral artery, and M2 segment of the right middle cerebral artery. No major intracranial vessel occlusion is identified.  S/p stenting of the left ICA by patient report         PAST MEDICAL HISTORY:  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    Stroke     Urinary tract infection        PAST SURGICAL HISTORY:  No past surgical history on file.    SOCIAL HISTORY:  Social History     Social History    Marital status: Single     Spouse name: N/A    Number of children: N/A    Years of education: N/A     Occupational History    disabled -  former  - dozers, etc      Social History Main Topics    Smoking status: Never Smoker    Smokeless tobacco: Not on file    Alcohol use Not on file    Drug use: Unknown    Sexual activity: Not on file     Other Topics Concern    Not on file     Social History Narrative    No narrative on file       ALLERGIES AND MEDICATIONS: updated and reviewed.  Review of patient's allergies indicates:   Allergen Reactions    Penicillins Hives     Current Outpatient Prescriptions   Medication Sig Dispense Refill    atorvastatin (LIPITOR) 80 MG tablet Take 1 tablet (80 mg total) by mouth once daily. 30 tablet 5    baclofen (LIORESAL) 10 MG tablet Take 1 tablet (10 mg total) by mouth 4 (four) times daily. 120 tablet 5    benazepril (LOTENSIN) 20 MG tablet Take 1 tablet (20 mg total) by mouth once daily. 30 tablet 11    clopidogrel (PLAVIX) 75 mg tablet Take 1 tablet (75 mg total) by mouth once daily. 30 tablet 5    furosemide (LASIX) 20 MG tablet Take 1 tablet (20 mg total) by mouth once daily. 30 tablet 5    insulin aspart (NOVOLOG) 100 unit/mL InPn pen Inject 7 Units into the skin 3 (three) times daily with meals. 5 Box 3    insulin glargine (LANTUS) 100 unit/mL injection Inject into the skin every evening.      phenytoin (DILANTIN) 100 MG ER capsule Take 1 capsule (100 mg total) by mouth 3 (three) times daily. 90 capsule 5    tamsulosin (FLOMAX) 0.4 mg Cp24 Take 2 capsules (0.8 mg total) by mouth once daily. Increased dose. 60 capsule 5     No current facility-administered medications for this visit.      Not taking Lantus  Taking Lasix as needed, estimates every other day    Review of Systems   Constitutional: Negative for fever, malaise/fatigue and weight loss.   HENT: Negative for congestion.    Eyes: Negative for blurred vision and pain.   Respiratory: Negative for shortness of breath and wheezing.    Cardiovascular: Negative for chest pain, palpitations and leg swelling.   Gastrointestinal:  "Negative for abdominal pain, blood in stool, constipation, diarrhea and heartburn.   Genitourinary: Negative for dysuria, hematuria and urgency.   Musculoskeletal: Negative for joint pain.   Neurological: Negative for tingling, weakness and headaches.   Psychiatric/Behavioral: Negative for depression. The patient is not nervous/anxious.        Physical Exam   Vitals:    08/02/17 0943 08/02/17 1013   BP: (!) 148/80 122/64   BP Location:  Left arm   Patient Position:  Sitting   Pulse: 68    Temp: 98 °F (36.7 °C)    SpO2: 98%    Weight: 87.8 kg (193 lb 7.3 oz)    Height: 5' 8" (1.727 m)     Body mass index is 29.41 kg/m².            Physical Exam   Constitutional: He is oriented to person, place, and time. He appears well-developed and well-nourished. No distress.   Eyes: EOM are normal.   Cardiovascular: Normal rate, regular rhythm and normal heart sounds.    No murmur heard.  Pulmonary/Chest: Effort normal and breath sounds normal.   Musculoskeletal: Normal range of motion. He exhibits no edema.   Neurological: He is alert and oriented to person, place, and time. A cranial nerve deficit is present. He exhibits abnormal muscle tone. Coordination normal.   Baseline right-sided hemiplegia   Skin: Skin is warm and dry.   Psychiatric: He has a normal mood and affect. His behavior is normal. Thought content normal.     "

## 2017-08-02 ENCOUNTER — OFFICE VISIT (OUTPATIENT)
Dept: FAMILY MEDICINE | Facility: CLINIC | Age: 62
End: 2017-08-02
Payer: COMMERCIAL

## 2017-08-02 ENCOUNTER — LAB VISIT (OUTPATIENT)
Dept: LAB | Facility: HOSPITAL | Age: 62
End: 2017-08-02
Attending: INTERNAL MEDICINE
Payer: MEDICARE

## 2017-08-02 VITALS
WEIGHT: 193.44 LBS | HEART RATE: 68 BPM | SYSTOLIC BLOOD PRESSURE: 122 MMHG | OXYGEN SATURATION: 98 % | BODY MASS INDEX: 29.32 KG/M2 | HEIGHT: 68 IN | TEMPERATURE: 98 F | DIASTOLIC BLOOD PRESSURE: 64 MMHG

## 2017-08-02 DIAGNOSIS — Z12.11 SCREENING FOR COLON CANCER: ICD-10-CM

## 2017-08-02 DIAGNOSIS — N18.30 CONTROLLED TYPE 2 DIABETES MELLITUS WITH STAGE 3 CHRONIC KIDNEY DISEASE, WITH LONG-TERM CURRENT USE OF INSULIN: Chronic | ICD-10-CM

## 2017-08-02 DIAGNOSIS — N18.30 CKD STAGE 3 SECONDARY TO DIABETES: ICD-10-CM

## 2017-08-02 DIAGNOSIS — E11.9 DM TYPE 2 WITHOUT RETINOPATHY: ICD-10-CM

## 2017-08-02 DIAGNOSIS — Z11.59 NEED FOR HEPATITIS C SCREENING TEST: ICD-10-CM

## 2017-08-02 DIAGNOSIS — E11.22 CONTROLLED TYPE 2 DIABETES MELLITUS WITH STAGE 3 CHRONIC KIDNEY DISEASE, WITH LONG-TERM CURRENT USE OF INSULIN: Chronic | ICD-10-CM

## 2017-08-02 DIAGNOSIS — E78.5 HYPERLIPIDEMIA, UNSPECIFIED HYPERLIPIDEMIA TYPE: Chronic | ICD-10-CM

## 2017-08-02 DIAGNOSIS — E11.9 DM TYPE 2 WITHOUT RETINOPATHY: Primary | ICD-10-CM

## 2017-08-02 DIAGNOSIS — Z79.4 CONTROLLED TYPE 2 DIABETES MELLITUS WITH STAGE 3 CHRONIC KIDNEY DISEASE, WITH LONG-TERM CURRENT USE OF INSULIN: Chronic | ICD-10-CM

## 2017-08-02 DIAGNOSIS — E11.22 CKD STAGE 3 SECONDARY TO DIABETES: ICD-10-CM

## 2017-08-02 DIAGNOSIS — I10 BENIGN ESSENTIAL HTN: Chronic | ICD-10-CM

## 2017-08-02 LAB
25(OH)D3+25(OH)D2 SERPL-MCNC: 4 NG/ML
ALBUMIN SERPL BCP-MCNC: 3.5 G/DL
ALP SERPL-CCNC: 73 U/L
ALT SERPL W/O P-5'-P-CCNC: 20 U/L
ANION GAP SERPL CALC-SCNC: 9 MMOL/L
AST SERPL-CCNC: 16 U/L
BASOPHILS # BLD AUTO: 0 K/UL
BASOPHILS NFR BLD: 0 %
BILIRUB SERPL-MCNC: 0.2 MG/DL
BUN SERPL-MCNC: 30 MG/DL
CALCIUM SERPL-MCNC: 8.6 MG/DL
CHLORIDE SERPL-SCNC: 108 MMOL/L
CHOLEST/HDLC SERPL: 3.3 {RATIO}
CO2 SERPL-SCNC: 21 MMOL/L
CREAT SERPL-MCNC: 1.6 MG/DL
DIFFERENTIAL METHOD: ABNORMAL
EOSINOPHIL # BLD AUTO: 0 K/UL
EOSINOPHIL NFR BLD: 0 %
ERYTHROCYTE [DISTWIDTH] IN BLOOD BY AUTOMATED COUNT: 14.7 %
EST. GFR  (AFRICAN AMERICAN): 52.6 ML/MIN/1.73 M^2
EST. GFR  (NON AFRICAN AMERICAN): 45.5 ML/MIN/1.73 M^2
GLUCOSE SERPL-MCNC: 80 MG/DL
HCT VFR BLD AUTO: 38.3 %
HCV AB SERPL QL IA: NEGATIVE
HDL/CHOLESTEROL RATIO: 30.2 %
HDLC SERPL-MCNC: 189 MG/DL
HDLC SERPL-MCNC: 57 MG/DL
HGB BLD-MCNC: 12.8 G/DL
LDLC SERPL CALC-MCNC: 117.2 MG/DL
LYMPHOCYTES # BLD AUTO: 1.3 K/UL
LYMPHOCYTES NFR BLD: 34.3 %
MCH RBC QN AUTO: 25.5 PG
MCHC RBC AUTO-ENTMCNC: 33.4 G/DL
MCV RBC AUTO: 76 FL
MONOCYTES # BLD AUTO: 0.4 K/UL
MONOCYTES NFR BLD: 10.1 %
NEUTROPHILS # BLD AUTO: 2 K/UL
NEUTROPHILS NFR BLD: 55.3 %
NONHDLC SERPL-MCNC: 132 MG/DL
PHOSPHATE SERPL-MCNC: 3.5 MG/DL
PLATELET # BLD AUTO: 150 K/UL
PMV BLD AUTO: 10.3 FL
POTASSIUM SERPL-SCNC: 5.2 MMOL/L
PROT SERPL-MCNC: 6.8 G/DL
PTH-INTACT SERPL-MCNC: 183 PG/ML
RBC # BLD AUTO: 5.02 M/UL
SODIUM SERPL-SCNC: 138 MMOL/L
TRIGL SERPL-MCNC: 74 MG/DL
WBC # BLD AUTO: 3.67 K/UL

## 2017-08-02 PROCEDURE — 99999 PR PBB SHADOW E&M-EST. PATIENT-LVL III: CPT | Mod: PBBFAC,,, | Performed by: INTERNAL MEDICINE

## 2017-08-02 PROCEDURE — 36415 COLL VENOUS BLD VENIPUNCTURE: CPT | Mod: PO

## 2017-08-02 PROCEDURE — 99214 OFFICE O/P EST MOD 30 MIN: CPT | Mod: S$GLB,,, | Performed by: INTERNAL MEDICINE

## 2017-08-02 PROCEDURE — 80053 COMPREHEN METABOLIC PANEL: CPT

## 2017-08-02 PROCEDURE — 82306 VITAMIN D 25 HYDROXY: CPT

## 2017-08-02 PROCEDURE — 3044F HG A1C LEVEL LT 7.0%: CPT | Mod: S$GLB,,, | Performed by: INTERNAL MEDICINE

## 2017-08-02 PROCEDURE — 85025 COMPLETE CBC W/AUTO DIFF WBC: CPT

## 2017-08-02 PROCEDURE — 83970 ASSAY OF PARATHORMONE: CPT

## 2017-08-02 PROCEDURE — 4010F ACE/ARB THERAPY RXD/TAKEN: CPT | Mod: S$GLB,,, | Performed by: INTERNAL MEDICINE

## 2017-08-02 PROCEDURE — 83036 HEMOGLOBIN GLYCOSYLATED A1C: CPT

## 2017-08-02 PROCEDURE — 80061 LIPID PANEL: CPT

## 2017-08-02 PROCEDURE — 84100 ASSAY OF PHOSPHORUS: CPT

## 2017-08-02 PROCEDURE — 86803 HEPATITIS C AB TEST: CPT

## 2017-08-02 NOTE — PATIENT INSTRUCTIONS
CALL DR. HOSEA SNYDER, EYE DOCTOR, TO SCHEDULE YOUR SURGERY,  - [phone]      Diet for Chronic Kidney Disease  Following a special diet when you have kidney disease can help you stay as healthy as possible. Your healthcare provider or dietitian should make a special diet plan just for you.    Eating right  Here are some good eating rules to follow:  · Protein. Eating protein is important for your body. But too much protein can put a strain on your kidneys. Eating less protein may slow the progression of chronic kidney disease. Foods high in protein include meat, fish, eggs, cheese, and other dairy products. A registered dietitian can help you plan a diet that has the right amount of protein for you.  · Sodium. Having too much salt in your diet can make your body hold onto (retain) water. Ask your provider or dietitian how much sodium per day you are allowed. This will help you avoid fluid buildup in your body (fluid retention). It can also help control high blood pressure. Learn to read food labels to know how much sodium is in one serving. Foods high in salt include processed meats, canned and boxed foods, sauces, salted chips and snacks, pickled foods, frozen dinners, and restaurant and fast food.  · Fluids. If you have advanced kidney disease, you will need to limit the water and fluids you drink. If you dont, then too much water will build up in your body. The exact amount of fluid you can drink depends on how well your kidneys are working. Ask your provider how much water you can safely drink each day.  · Potassium. In advanced kidney disease, your potassium level can go dangerously high. This affects your heart. It can cause an irregular heartbeat (arrhythmia). Ask your provider or dietitian if you should limit potassium in your diet. Foods high in potassium include dairy products (milk, yogurt, cheese), dried beans, bananas, oranges, potatoes, tomatoes, spinach, cantaloupe, honeydew melon, dried  fruits, and nuts.   · Calcium. Calcium is important to build strong bones. But foods high in calcium are also high in phosphorus, which can take calcium from your bones. Limiting foods high in phosphorus will help keep calcium in your bones. Ask your provider how much calcium you should get each day.  · Phosphorus. In advanced kidney disease, your phosphorus level can go dangerously high. This affects many systems in the body and can damage your heart. Limit your intake of phosphorus-rich foods. These include dried beans and peas, nuts, peanut butter, cocoa, beer, cola drinks, and dairy products.  Date Last Reviewed: 8/1/2016  © 3860-3228 [employer]. [address] [place] [address], [place] [address]. All rights reserved. This information is not intended as a substitute for professional medical care. Always follow your healthcare professional's instructions.

## 2017-08-03 ENCOUNTER — CLINICAL SUPPORT (OUTPATIENT)
Dept: REHABILITATION | Facility: HOSPITAL | Age: 62
End: 2017-08-03
Attending: NEUROLOGICAL SURGERY
Payer: MEDICARE

## 2017-08-03 ENCOUNTER — TELEPHONE (OUTPATIENT)
Dept: FAMILY MEDICINE | Facility: CLINIC | Age: 62
End: 2017-08-03

## 2017-08-03 DIAGNOSIS — N25.81 SECONDARY HYPERPARATHYROIDISM OF RENAL ORIGIN: ICD-10-CM

## 2017-08-03 DIAGNOSIS — R53.1 RIGHT SIDED WEAKNESS: ICD-10-CM

## 2017-08-03 DIAGNOSIS — M62.89 MUSCLE TIGHTNESS: ICD-10-CM

## 2017-08-03 DIAGNOSIS — E55.9 VITAMIN D DEFICIENCY: ICD-10-CM

## 2017-08-03 DIAGNOSIS — I69.359 HEMIPARESIS AFFECTING DOMINANT SIDE AS LATE EFFECT OF CEREBROVASCULAR ACCIDENT: Primary | ICD-10-CM

## 2017-08-03 LAB
ESTIMATED AVG GLUCOSE: 140 MG/DL
HBA1C MFR BLD HPLC: 6.5 %

## 2017-08-03 PROCEDURE — 97112 NEUROMUSCULAR REEDUCATION: CPT | Mod: PN

## 2017-08-03 RX ORDER — ERGOCALCIFEROL 1.25 MG/1
50000 CAPSULE ORAL
Qty: 4 CAPSULE | Refills: 10 | Status: SHIPPED | OUTPATIENT
Start: 2017-10-02 | End: 2018-05-11 | Stop reason: ALTCHOICE

## 2017-08-03 RX ORDER — ERGOCALCIFEROL 1.25 MG/1
50000 CAPSULE ORAL
Qty: 4 CAPSULE | Refills: 0 | Status: SHIPPED | OUTPATIENT
Start: 2017-08-03 | End: 2017-10-08 | Stop reason: SDUPTHER

## 2017-08-03 NOTE — PROGRESS NOTES
a1c good on novolog only not on lantus  PTH high and vit D low.  CKD  HCV screen negative  Would start vit D 50k weekly for 8 weeks, then monthly thereafter

## 2017-08-03 NOTE — PROGRESS NOTES
Patient:  Miguel Moore  Clinic #:  [id number]   Date of Note: 08/03/2017   Referring Physician:  Geovany Rucker MD  Diagnosis:    Encounter Diagnoses   Name Primary?    Hemiparesis affecting dominant side as late effect of cerebrovascular accident Yes    Right sided weakness     Muscle tightness         Start Time: 11:25 am  End Time: 12:05 pm  Total Time: 40 min  Group Time: -    2 of 20 visits  Exp 12/31/17    Subjective:   Pt states he is feeling good.  Pain: no pain reported today    Objective:   Patient seen by OT this session. Treatment  consist of the following:  Neuromuscular re-education    Treatment: Neuromuscular re-education x 40 min   Pt was engaged in wgt bearing onto RUE with initial treatment of reducing tone in right hand to achieve palm on surface of half ball. Wgt bearing provided by therapist intermittent pressure.  Additional wgt bearing with reaching activity with LUE to right quadrant for wgt bearing on right hand.  Pt transitioned to supine for self stretching of right shoulder and elbow.  Pt used left hand hold on right wrist for flexion, verbal cues to continue motion for good stretch x 10 reps.  Rolling pin utilized to achieve stretching of right elbow into extension, verbal cues for additional stretch x 10 reps.  Pt's right hand was positioned with pulley handle for passive non resistive pulley stretch for shoulder flexion in seated position x 10 reps.  Pt demonstrated his stretching across a table by positioning hand over edge and shifting his body, pt was instructed lean back as to produce greater elbow extension.  All stretches performed in slow manner to decrease tonal increases with stretch.    Plan of care rec'd from Dr. Rucker for care from 7/20/17 to 9/14/17.    Assessment:  Pt able to demonstrate and performed supine stretches in clinic but still required cueing to achieve full stretch.  Pt will continue to benefit from skilled OT intervention. Medical necessity is  demonstrated by: Pt continues to be limited in functional and leisurely pursuits. Pain limits patients participation in ADL's. Pt is not able to carryout necessary vocational tasks. Patient continues to requires cues and skilled supervision to complete HEP    Plan:  Continue with plan of care 1x week x 8 weeks  during the certification period from   7/20/17  to 9/14/17  in pursuit of  OT goals.

## 2017-08-03 NOTE — TELEPHONE ENCOUNTER
ATTEMPTED TO CONTACT PATIENT CONCERNING TEST RESULTS. NO ANSWER, LEFT VM INSTRUCTING PATIENT TO CALL THE CLINIC AT HIS EARLIEST CONVENIENCE.

## 2017-08-03 NOTE — TELEPHONE ENCOUNTER
Please call pt:  1. Vitamin D very low.  I sent in prescription - take 1 pill every week for 2 months.  After that, take one a month and keep taking it for maintenance.    Rest of his labs were normal.  No change to his insulin regimen.    Follow up 6 months

## 2017-08-03 NOTE — TELEPHONE ENCOUNTER
SPOKE WITH PATIENT CONCERNING TEST RESULTS AND VIT D LEVEL. PATIENT VERBALIZED UNDERSTANDING. NO FURTHER QUESTIONS OR CONCERNS AT THIS TIME.

## 2017-08-07 ENCOUNTER — TELEPHONE (OUTPATIENT)
Dept: OPHTHALMOLOGY | Facility: CLINIC | Age: 62
End: 2017-08-07

## 2017-08-07 DIAGNOSIS — H25.13 NUCLEAR SCLEROTIC CATARACT OF BOTH EYES: Primary | ICD-10-CM

## 2017-08-07 RX ORDER — DIFLUPREDNATE OPHTHALMIC 0.5 MG/ML
1 EMULSION OPHTHALMIC 4 TIMES DAILY
Qty: 5 ML | Refills: 1 | Status: SHIPPED | OUTPATIENT
Start: 2017-10-05 | End: 2017-11-04

## 2017-08-07 RX ORDER — NEPAFENAC 3 MG/ML
1 SUSPENSION/ DROPS OPHTHALMIC DAILY
Qty: 3 ML | Refills: 1 | Status: SHIPPED | OUTPATIENT
Start: 2017-10-02 | End: 2017-11-01

## 2017-08-07 RX ORDER — OFLOXACIN 3 MG/ML
1 SOLUTION/ DROPS OPHTHALMIC 4 TIMES DAILY
Qty: 5 ML | Refills: 1 | Status: SHIPPED | OUTPATIENT
Start: 2017-10-02 | End: 2017-10-12

## 2017-08-14 ENCOUNTER — CLINICAL SUPPORT (OUTPATIENT)
Dept: REHABILITATION | Facility: HOSPITAL | Age: 62
End: 2017-08-14
Attending: NEUROLOGICAL SURGERY
Payer: MEDICARE

## 2017-08-14 DIAGNOSIS — R53.1 RIGHT SIDED WEAKNESS: ICD-10-CM

## 2017-08-14 DIAGNOSIS — M62.89 MUSCLE TIGHTNESS: ICD-10-CM

## 2017-08-14 DIAGNOSIS — I69.359 HEMIPARESIS AFFECTING DOMINANT SIDE AS LATE EFFECT OF CEREBROVASCULAR ACCIDENT: Primary | ICD-10-CM

## 2017-08-14 DIAGNOSIS — Z74.09 IMPAIRED FUNCTIONAL MOBILITY, BALANCE, GAIT, AND ENDURANCE: ICD-10-CM

## 2017-08-14 PROCEDURE — G8979 MOBILITY GOAL STATUS: HCPCS | Mod: CK,PN

## 2017-08-14 PROCEDURE — 97110 THERAPEUTIC EXERCISES: CPT | Mod: PN

## 2017-08-14 PROCEDURE — G8978 MOBILITY CURRENT STATUS: HCPCS | Mod: CK,PN

## 2017-08-14 PROCEDURE — 97112 NEUROMUSCULAR REEDUCATION: CPT | Mod: PN

## 2017-08-14 NOTE — PROGRESS NOTES
Name: Miguel Moore  Clinic Number: [id number]  Date of Treatment: 2017   Diagnosis:   Encounter Diagnoses   Name Primary?    Hemiparesis affecting dominant side as late effect of cerebrovascular accident Yes    Right sided weakness     Muscle tightness     Impaired functional mobility, balance, gait, and endurance        Time in: 1400  Time Out: 1435  Total Treatment Time:  35'  ( 1:1 with PT for duration of treatment session)     Visit #         Subjective:    Miguel reports that he is tired from his OT appointment, and has been working hard with his HEP.  Patient reports their pain to be 0/10 on a 0-10 scale with 0 being no pain and 10 being the worst pain imaginable.       Objective    Lower Extremity Strength  Right LE   Left LE     Hip flexion:  4+/5 Hip flexion: 5/5   Knee extension: 4+/5 Knee extension: 5/5   Knee flexion: 4-/5 Knee flexion: 5/5   Ankle dorsiflexion:  2+/5 Ankle dorsiflexion: 5/5   Ankle plantarflexion:  3/5 Ankle plantarflexion: 5/5   Hip abduction: 4/5 Hip abduction: 4+/5   Hip adduction: 4+/5 Hip adduction 5/5   Hip extension: 2/5 Hip extension: 4/5      Functional Strength:                        -30 sec sit to stand: 5                        -Heel Walking: unable to perform                         -Toe Walking:  Unable to perform  Stairs: heavy assist from LUE - step to pattern to ascend and descend - descents     TU seconds using SBQC     WILD Assessment  1. Sitting to Standing   3 - able to stand independely using hands  2. Standing Unsupported   4 - able to stand safely 2 minutes without hold  3. Sitting Unsupported   4 - able to sit safely and securely 2 minutes  4. Standing to Sitting   3 - controls descent by using hands  5. Pivot Transfer   3 - able to transfer safely with definite use of hands  6. Standing with Eyes Closed   4 - albe to stand 10 seconds safely  7. Standing with Feet Together   4 - able to place feet together independently and stand 1 minute  "safely  8. Reaching Forward with Outstretched Arm   3 - can reach forward 12 cm/5 inches safely  9. Retrieving Object from Floor   4 - able to  slipper safely and easily  10. Turning to Look Behind   3 - looks behind one side only, other side less weight shift  11. Turning 360 Degrees   2 - able to turn 360 safely but slowly  12. Placing Alternate Foot on Step   1 - able to complete > 2 steps needs min assist  13. Standing with One Foot in Front   0 - Looses balance while stepping or standing  14. Standing on One Foot   1 - tries to lift leg and unable to hold 3 seconds but remains standing independently  TOTAL: 38/56    Functional Limitations Reports - G Codes  Category: Mobility  Tool: FOTO  Score: 48% limitation  Current:  CK 40-60%  Goal:  CK 40-60%    Miguel presents to therapy with SBQC.     Miguel received therapeutic exercises to develop strength, endurance, ROM, flexibility, posture and core stabilization for 25  minutes including:     Nustep x 8' hills level 1   Heel Props with quad sets x 3'   SAQ 2' x 3'' hold    Bridges 2 x 10   HL hip adduction x 2'   BKFO RLE x 2'   SLR 2 x 10   Supine HSS with Strap 2 x 30''   Supine hip flexor stretch 2 x 30''   LAQ 10 x 3'' hold at end range    Standing Heel Raises 1 x 10   Standing Marches x 20   Standing Hip abduction 2 x 10 ea LE   Step ups 4" 1 x 15 ea LE   Lateral Step Ups 1 x 10 ea LE ( unable to achieve full TKE on RLE)   Cone Taps 1 x 10 ea LE ( cueing for trunk compensation on the RLE)     + Mat Mobility sit<>supine Mod I, sit<>supine Mod I , Rolling L & R Mod I     Patient participated in neuromuscular re-education activities to improve: Balance, Coordination and Posture for 10 minutes. The following activities were included:     Sit<>Stand 2 x 10 - LUE on RLE for push-off, YTB used for TC  Gait training x 60' without SBQC and verbal cueing to increase step length and hip/knee flexion   Lateral Kelly Step -Overs 1 x 10 ea LE   Fwd " Kelly step over 1 x 10 ea LE   Tandem walking 1 lap  Retro walking 4 lap  Fwd [place] with focus on exaggerated step lengths x 5 laps     Written Home Exercises reviewed.   Pt demo good understanding of the education provided. Miguel demonstrated good return demonstration of activities.     Assessment:   Mr. Rivera tolerated treatment session well.  Patient with muscle fatigue easily achieved requiring frequent seated rest breaks. Monthly assessment performed today. Pt presenting with good improvements in RLE strength with continues greatest limitation in R ankle dorsiflexion and knee flexion. Pt with significant improvements in schulte balance assessment and TUG score, although pt is still considered a fall risk. Pt has achieved all of his short term goals with the exception of LE strength and is progressing apporpaitely towards his long term goals. Pt will continue to benefit from skilled PT intervention. Medical Necessity is demonstrated by:  Fall Risk, Continued inability to participate in vocational pursuits, Unable to participate fully in daily activities, Requires skilled supervision to complete and progress HEP and Weakness.    Patient is making fair progress towards established goals.       GOALS: Short Term Goals:  4 weeks  1. Pt will demonstrate bed mobility without PT assist for ease with bed mobility at home met  2. Pt to increase B popliteal angle by > or = 10 degrees in order to improve flexibility and posture. met  3. Increased MMT  to  3/5 dorsiflexion  to increase tolerance for ADL and work activities. In progress  4. Pt will demonstrate improved TUG score by 3 seconds for ease with functional mobility met  5. Pt to tolerate HEP to improve ROM and independence with ADL's met      Plan:  Continue with established Plan of Care towards PT goals.     Gogo Katz, PT

## 2017-08-14 NOTE — PROGRESS NOTES
Patient:  Miguel Moore  Clinic #:  [id number]   Date of Note: 08/14/2017   Referring Physician:  Geovany Rucker MD  Diagnosis:    Encounter Diagnoses   Name Primary?    Hemiparesis affecting dominant side as late effect of cerebrovascular accident Yes    Right sided weakness     Muscle tightness         Start Time: 1:35 pm  End Time: 2:00 pm  Total Time: 25 min  Group Time: -    3 of 20 visits  Exp 12/31/17    Subjective:   Pt states he had a good birthday.  Pt states he didn't come last week to therapy because it was his birthday.  Pt states he has been sleeping on his side and leaning on his right side for weight bearing.  Pain: no pain reported today    Objective:   Patient seen by OT this session. Treatment  consist of the following:  Neuromuscular re-education    Treatment: Neuromuscular re-education x 25 min      Joint Evaluation AROM  PROM     right Right Left Right   Shoulder flex 0-180 25(-15)    90   Shoulder Abd 0-180 25(+5)   90   Shoulder ER 0-90 20(+10)   40   Shoulder IR 0-90 40(=)   45   Shoulder Extension 0-80 20(+20)   40   Shoulder Horizontal adduction 0-90 10(+5)   15   Elbow flex/ext 0-150 100(+10)/-55(+15)   110/-30   Wrist flex 0-80 0   30   Wrist ext 0-70 0   10   Supination 0-80 -40(+5)   15   Pronation 0-80 full   80      Pt was engaged in wgt bearing onto RUE with initial treatment of reducing tone in right hand to achieve palm on surface table. Pt was demonstrated how in seated position increase in compensatory truck movements with self ranging.  Pt practiced wgt bearing on RUE and table top with table slides.. Wgt bearing provided by therapist intermittent pressure.  Pt performed supine hand hold hand stretch into shoulder flexion with manual cues to  achieve of greater then 90* of motion.  Pt encouraged to continue with his stretching at home.   Plan of care rec'd from Dr. Rucker for care from 7/20/17 to 9/14/17.    Assessment:    GOALS:  Time frame: 8 weeks  Competence in HEP  to increase proprioceptive input into RUE to reduce tone- in progress  Pt will demonstrate stretches in clinic for RUE with 90% accuracy- improving  Pt will receive a resting hand splint for RUE.- not needed due to full AROM with pt in supine position overnight.       Pt will continue to benefit from skilled OT intervention. Medical necessity is demonstrated by: Pt continues to be limited in functional and leisurely pursuits. Pain limits patients participation in ADL's. Pt is not able to carryout necessary vocational tasks. Patient continues to requires cues and skilled supervision to complete HEP    Plan:  Continue with plan of care 1x week x 8 weeks  during the certification period from   7/20/17  to 9/14/17  in pursuit of  OT goals.

## 2017-08-16 ENCOUNTER — CLINICAL SUPPORT (OUTPATIENT)
Dept: REHABILITATION | Facility: HOSPITAL | Age: 62
End: 2017-08-16
Attending: NEUROLOGICAL SURGERY
Payer: MEDICARE

## 2017-08-16 DIAGNOSIS — Z74.09 IMPAIRED FUNCTIONAL MOBILITY, BALANCE, GAIT, AND ENDURANCE: ICD-10-CM

## 2017-08-16 DIAGNOSIS — M62.89 MUSCLE TIGHTNESS: Primary | ICD-10-CM

## 2017-08-16 DIAGNOSIS — I69.359 HEMIPARESIS AFFECTING DOMINANT SIDE AS LATE EFFECT OF CEREBROVASCULAR ACCIDENT: ICD-10-CM

## 2017-08-16 PROCEDURE — 97112 NEUROMUSCULAR REEDUCATION: CPT | Mod: PN

## 2017-08-16 PROCEDURE — 97110 THERAPEUTIC EXERCISES: CPT | Mod: PN

## 2017-08-16 NOTE — PROGRESS NOTES
"Name: Miguel Moore  Clinic Number: [id number]  Date of Treatment: 08/16/2017   Diagnosis:   Encounter Diagnoses   Name Primary?    Muscle tightness Yes    Impaired functional mobility, balance, gait, and endurance     Hemiparesis affecting dominant side as late effect of cerebrovascular accident        Time in: 1000  Time Out: 1100  Total Treatment Time:  60'  ( 1:1 with PT for 30' of treatment session)     Visit #  8/ 25       Subjective:    Miguel reports that he is doing better today than last session.  Patient reports their pain to be 0/10 on a 0-10 scale with 0 being no pain and 10 being the worst pain imaginable.       Objective      Miguel presents to therapy with SBQC.     Miguel received therapeutic exercises to develop strength, endurance, ROM, flexibility, posture and core stabilization for 20  minutes including:     Nustep x 8' hills level 1 - upright bike performed this session  Heel Props with quad sets x 3'   SAQ 2' x 3'' hold    Bridges 2 x 10   HL hip adduction x 2'   BKFO RLE x 2'   SLR 2 x 10   Supine HSS with Strap 2 x 30''   Supine hip flexor stretch 2 x 30''   LAQ 10 x 3'' hold at end range    Standing Heel Raises 1 x 10   Standing Marches x 20   Standing Hip abduction 2 x 10 ea LE   Step ups 4" 1 x 15 ea LE   Lateral Step Ups 1 x 10 ea LE ( unable to achieve full TKE on RLE)   Cone Taps 1 x 10 ea LE ( cueing for trunk compensation on the RLE)     + Mat Mobility sit<>supine Mod I, sit<>supine Mod I , Rolling L & R Mod I     Patient participated in neuromuscular re-education activities to improve: Balance, Coordination and Posture for 40 minutes. The following activities were included:     Sit<>Stand 2 x 10 - LUE on RLE for push-off, YTB used for TC  Gait training x 60' without SBQC in // bars and verbal cueing to increase step length and hip/knee flexion   Lateral Kelly Step -Overs 1 x 10 ea LE   Fwd Kelly step over 1 x 10 ea LE - Tactile facilitation to the R hip flexors)  Tandem " walking 1 lap  Retro walking 4 lap  Fwd Waling with focus on exaggerated step lengths x 5 laps     Written Home Exercises reviewed.   Pt demo good understanding of the education provided. Miguel demonstrated good return demonstration of activities.     Assessment:   Mr. Rivera tolerated treatment session well.  Pt with good tolerance to tactile facilitation of R hip flexors with standing exercises in parallel bars demonstrating improved leg elevation for step clearance. Pt fatigued easily this session, producing mild increase in tone of the RLE, requiring min assist with bed mobility and positioning for supine mat exercises.  Pt will continue to benefit from skilled PT intervention. Medical Necessity is demonstrated by:  Fall Risk, Continued inability to participate in vocational pursuits, Unable to participate fully in daily activities, Requires skilled supervision to complete and progress HEP and Weakness.    Patient is making fair progress towards established goals.       GOALS: Short Term Goals:  4 weeks  1. Pt will demonstrate bed mobility without PT assist for ease with bed mobility at home met  2. Pt to increase B popliteal angle by > or = 10 degrees in order to improve flexibility and posture. met  3. Increased MMT  to  3/5 dorsiflexion  to increase tolerance for ADL and work activities. In progress  4. Pt will demonstrate improved TUG score by 3 seconds for ease with functional mobility met  5. Pt to tolerate HEP to improve ROM and independence with ADL's met      Plan:  Continue with established Plan of Care towards PT goals.     Gogo Katz, PT

## 2017-08-23 ENCOUNTER — CLINICAL SUPPORT (OUTPATIENT)
Dept: REHABILITATION | Facility: HOSPITAL | Age: 62
End: 2017-08-23
Attending: NEUROLOGICAL SURGERY
Payer: MEDICARE

## 2017-08-23 DIAGNOSIS — R53.1 RIGHT SIDED WEAKNESS: Primary | ICD-10-CM

## 2017-08-23 DIAGNOSIS — M62.89 MUSCLE TIGHTNESS: Primary | ICD-10-CM

## 2017-08-23 DIAGNOSIS — Z74.09 IMPAIRED FUNCTIONAL MOBILITY, BALANCE, GAIT, AND ENDURANCE: ICD-10-CM

## 2017-08-23 DIAGNOSIS — I69.359 HEMIPARESIS AFFECTING DOMINANT SIDE AS LATE EFFECT OF CEREBROVASCULAR ACCIDENT: ICD-10-CM

## 2017-08-23 DIAGNOSIS — R53.1 RIGHT SIDED WEAKNESS: ICD-10-CM

## 2017-08-23 PROCEDURE — 97110 THERAPEUTIC EXERCISES: CPT | Mod: PN

## 2017-08-23 PROCEDURE — 97112 NEUROMUSCULAR REEDUCATION: CPT | Mod: PN

## 2017-08-23 NOTE — PROGRESS NOTES
"Name: Miguel Moore  Clinic Number: [id number]  Date of Treatment: 08/23/2017   Diagnosis:   Encounter Diagnoses   Name Primary?    Muscle tightness Yes    Impaired functional mobility, balance, gait, and endurance     Hemiparesis affecting dominant side as late effect of cerebrovascular accident     Right sided weakness        Time in: 1000  Time Out: 1100  Total Treatment Time:  60'  ( 1:1 with PT for 60' of treatment session)     Visit #  9/ 25       Subjective:    Miguel reports that he is doing better today than last session.  Patient reports their pain to be 0/10 on a 0-10 scale with 0 being no pain and 10 being the worst pain imaginable.       Objective      Miguel presents to therapy with SBQC.     Miguel received therapeutic exercises to develop strength, endurance, ROM, flexibility, posture and core stabilization for 30  minutes including:     Nustep x 8' hills level 1   Heel Props with quad sets x 3'   SAQ 2' x 3'' hold    Bridges 2 x 10   HL hip adduction x 2'   BKFO RLE x 2'   SLR 2 x 10   Supine HSS with Strap 2 x 30''   Supine hip flexor stretch 2 x 30''   MATRIX LAQ 10 # 3x5 x 3'' hold at end range    Standing Heel Raises 1 x 10   Standing Marches x 20   Standing Hip abduction 2 x 10 ea LE   Step ups 4" 1 x 15 ea LE   Lateral Step Ups 1 x 10 ea LE ( unable to achieve full TKE on RLE)   Cone Taps 1 x 10 ea LE ( cueing for trunk compensation on the RLE)     + Mat Mobility sit<>supine Mod I, sit<>supine Mod I , Rolling L & R Mod I     Patient participated in neuromuscular re-education activities to improve: Balance, Coordination and Posture for 30 minutes. The following activities were included:     Sit<>Stand 2 x 10 - LUE on RLE for push-off, YTB used for TC  Gait training x 60' without SBQC in // bars and verbal cueing to increase step length and hip/knee flexion  Large stepping forward and return, as well as side and return x10 ea. In // bars   Lateral Kelly Step -Overs 1 x 10 ea LE   Fwd " Kelly step over 1 x 10 ea LE - Tactile facilitation to the R hip flexors)  Tandem walking 1 lap  Retro walking 4 lap  Fwd Waling with focus on exaggerated step lengths x 5 laps     Written Home Exercises reviewed.   Pt demo good understanding of the education provided. Miguel demonstrated good return demonstration of activities.     Assessment:   Mr. Rivera tolerated treatment session well.  Pt with excellent tolerance to progression of LE strengthening this session. Pt with improvements noted in sit to stand transitions without requiring multiple attempts and without significant valgus collapse of the R knee. Pt ambulating with improved step length bilaterally, however, lacks L hip extension requiring increased knee flexion at end stance phase, and decreased dorsiflexion of the R ankle with limitation in heel toe pattern.  Pt will continue to benefit from skilled PT intervention. Medical Necessity is demonstrated by:  Fall Risk, Continued inability to participate in vocational pursuits, Unable to participate fully in daily activities, Requires skilled supervision to complete and progress HEP and Weakness.    Patient is making fair progress towards established goals.       GOALS: Short Term Goals:  4 weeks  1. Pt will demonstrate bed mobility without PT assist for ease with bed mobility at home met  2. Pt to increase B popliteal angle by > or = 10 degrees in order to improve flexibility and posture. met  3. Increased MMT  to  3/5 dorsiflexion  to increase tolerance for ADL and work activities. In progress  4. Pt will demonstrate improved TUG score by 3 seconds for ease with functional mobility met  5. Pt to tolerate HEP to improve ROM and independence with ADL's met      Plan:  Continue with established Plan of Care towards PT goals.     Gogo Katz, PT

## 2017-08-23 NOTE — PROGRESS NOTES
Patient:  Miguel Moore  Clinic #:  [id number]   Date of Note: 08/23/2017   Referring Physician:  Geovany Rucker MD  Diagnosis:    Encounter Diagnoses   Name Primary?    Right sided weakness Yes    Hemiparesis affecting dominant side as late effect of cerebrovascular accident         Start Time: 11:15 am  End Time: 11:50 pm  Total Time: 35 min  Group Time: -    4 of 20 visits  Exp 12/31/17    Subjective:   Pt states he is gotta go he has another appointment at 12.  Pt reports that he has been doing his exercises at home.   Pain: no pain reported today    Objective:   Patient seen by OT this session. Treatment  consist of the following:  Neuromuscular re-education    Treatment: Neuromuscular re-education x 35 min   Pt was engaged in wgt bearing onto RUE with simultaneous digital stretching.  Standing with wgt bearing on ball with movement of arm into forward and lateral movements.  Pt was engaged in supine chest press with dowel, manual cues for more efficient technique x 10 reps.  Pt engaged in left hand hold on right wrist for stretching into shoulder flexion, verbal cues to sustain elbow in extension during  Motion x 10 reps. Pt rec'd stretching of right shoulder into all planes and elbow into flex/ext by therapist with some crepitus noted with rotation and elbow extension.  Pt was encouraged to continue with his HEP.    Plan of care rec'd from Dr. Rucker for care from 7/20/17 to 9/14/17.    Assessment:  Pt with reports of feeling more flexible after therapy session. Pt will continue to benefit from skilled OT intervention. Medical necessity is demonstrated by: Pt continues to be limited in functional and leisurely pursuits. Pain limits patients participation in ADL's. Pt is not able to carryout necessary vocational tasks. Patient continues to requires cues and skilled supervision to complete HEP    Plan:  Continue with plan of care 1x week x 8 weeks  during the certification period from   7/20/17  to  9/14/17  in pursuit of  OT goals.

## 2017-08-28 ENCOUNTER — CLINICAL SUPPORT (OUTPATIENT)
Dept: REHABILITATION | Facility: HOSPITAL | Age: 62
End: 2017-08-28
Attending: NEUROLOGICAL SURGERY
Payer: MEDICARE

## 2017-08-28 DIAGNOSIS — I69.359 HEMIPARESIS AFFECTING DOMINANT SIDE AS LATE EFFECT OF CEREBROVASCULAR ACCIDENT: ICD-10-CM

## 2017-08-28 DIAGNOSIS — M62.89 MUSCLE TIGHTNESS: Primary | ICD-10-CM

## 2017-08-28 DIAGNOSIS — Z74.09 IMPAIRED FUNCTIONAL MOBILITY, BALANCE, GAIT, AND ENDURANCE: ICD-10-CM

## 2017-08-28 DIAGNOSIS — R53.1 RIGHT SIDED WEAKNESS: ICD-10-CM

## 2017-08-28 PROCEDURE — 97112 NEUROMUSCULAR REEDUCATION: CPT | Mod: PN

## 2017-08-28 PROCEDURE — 97110 THERAPEUTIC EXERCISES: CPT | Mod: PN

## 2017-08-28 NOTE — PROGRESS NOTES
"Name: Miguel Moore  Clinic Number: [id number]  Date of Treatment: 08/28/2017   Diagnosis:   No diagnosis found.    Time in: 0955  Time Out: 1050  Total Treatment Time:  55'  ( 1:1 with PT for 55' of treatment session)     Visit #  10/ 25       Subjective:    Miguel reports that he is doing good today.  Patient reports their pain to be 0/10 on a 0-10 scale with 0 being no pain and 10 being the worst pain imaginable.       Objective      Miguel presents to therapy with SBQC.     Miguel received therapeutic exercises to develop strength, endurance, ROM, flexibility, posture and core stabilization for 30  minutes including:     Nustep x 8' hills level 1   Heel Props with quad sets x 3'   SAQ 2' x 3'' hold    Bridges 2 x 10   HL hip adduction x 2'   BKFO RLE x 2'   SLR 2 x 10   Supine HSS with Strap 2 x 30''   Supine hip flexor stretch 2 x 30''   MATRIX LAQ 10 # 3x5 x 3'' hold at end range  Shuttle RLE only (semi reclined) 1.5 cords    Standing Heel Raises 1 x 10   Standing Marches x 20   Standing Hip abduction 2 x 10 ea LE   Step ups 4" 1 x 15 ea LE   Lateral Step Ups 1 x 10 ea LE ( unable to achieve full TKE on RLE)   Standing hamstring stretch 2x30 seconds ea.       + Mat Mobility sit<>supine Mod I, sit<>supine Mod I , Rolling L & R Mod I     Patient participated in neuromuscular re-education activities to improve: Balance, Coordination and Posture for 30 minutes. The following activities were included:     Sit<>Stand 2 x 10 - LUE on RLE for push-off, YTB used for TC  Gait training x 60' without SBQC in // bars and verbal cueing to increase step length and hip/knee flexion  Large stepping forward and return, as well as side and return x10 ea. In // bars  Kelly Taps (level 1) 2 x 10 ea LE ( cueing for trunk compensation on the RLE)   Lateral Kelly Step -Overs 1 x 15 ea LE   Fwd Kelly step over 1 x 15 ea LE - Tactile facilitation to the R hip flexors)  Tandem walking 1 lap  Retro walking 4 lap  Fwd Waling with " focus on exaggerated step lengths x 5 laps     Written Home Exercises reviewed.   Pt demo good understanding of the education provided. Miguel demonstrated good return demonstration of activities.     Assessment:   Mr. Rivera tolerated treatment session well.  Pt able to complete all neuromuscular re-education exercises without tactile facilitation this session, yet continues to demonstrate compensatory movements due to lack of mobility from muscle tone in the RLE. Pt ambulating with improved step length bilaterally, however, lacks L hip extension requiring increased knee flexion at end stance phase, and decreased dorsiflexion of the R ankle with limitation in heel toe pattern.  Pt will continue to benefit from skilled PT intervention. Medical Necessity is demonstrated by:  Fall Risk, Continued inability to participate in vocational pursuits, Unable to participate fully in daily activities, Requires skilled supervision to complete and progress HEP and Weakness.    Patient is making fair progress towards established goals.       GOALS: Short Term Goals:  4 weeks  1. Pt will demonstrate bed mobility without PT assist for ease with bed mobility at home met  2. Pt to increase B popliteal angle by > or = 10 degrees in order to improve flexibility and posture. met  3. Increased MMT  to  3/5 dorsiflexion  to increase tolerance for ADL and work activities. In progress  4. Pt will demonstrate improved TUG score by 3 seconds for ease with functional mobility met  5. Pt to tolerate HEP to improve ROM and independence with ADL's met      Plan:  Continue with established Plan of Care towards PT goals.     Gogo Katz, PT

## 2017-08-30 ENCOUNTER — OFFICE VISIT (OUTPATIENT)
Dept: NEUROLOGY | Facility: CLINIC | Age: 62
End: 2017-08-30
Payer: COMMERCIAL

## 2017-08-30 VITALS
DIASTOLIC BLOOD PRESSURE: 92 MMHG | HEIGHT: 68 IN | SYSTOLIC BLOOD PRESSURE: 165 MMHG | HEART RATE: 91 BPM | BODY MASS INDEX: 29.03 KG/M2 | WEIGHT: 191.56 LBS

## 2017-08-30 DIAGNOSIS — G40.909 SEIZURE DISORDER AS SEQUELA OF CEREBROVASCULAR ACCIDENT: Chronic | ICD-10-CM

## 2017-08-30 DIAGNOSIS — I69.351 HEMIPLEGIA AND HEMIPARESIS FOLLOWING CEREBRAL INFARCTION AFFECTING RIGHT DOMINANT SIDE: Chronic | ICD-10-CM

## 2017-08-30 DIAGNOSIS — I10 ESSENTIAL HYPERTENSION: ICD-10-CM

## 2017-08-30 DIAGNOSIS — I69.398 SEIZURE DISORDER AS SEQUELA OF CEREBROVASCULAR ACCIDENT: Chronic | ICD-10-CM

## 2017-08-30 DIAGNOSIS — I69.30 HISTORY OF STROKE WITH RESIDUAL EFFECTS: Primary | Chronic | ICD-10-CM

## 2017-08-30 PROCEDURE — 99999 PR PBB SHADOW E&M-EST. PATIENT-LVL III: CPT | Mod: PBBFAC,,, | Performed by: NEUROLOGICAL SURGERY

## 2017-08-30 PROCEDURE — 3080F DIAST BP >= 90 MM HG: CPT | Mod: S$GLB,,, | Performed by: NEUROLOGICAL SURGERY

## 2017-08-30 PROCEDURE — 99214 OFFICE O/P EST MOD 30 MIN: CPT | Mod: S$GLB,,, | Performed by: NEUROLOGICAL SURGERY

## 2017-08-30 PROCEDURE — 3077F SYST BP >= 140 MM HG: CPT | Mod: S$GLB,,, | Performed by: NEUROLOGICAL SURGERY

## 2017-08-30 PROCEDURE — 3008F BODY MASS INDEX DOCD: CPT | Mod: S$GLB,,, | Performed by: NEUROLOGICAL SURGERY

## 2017-08-30 RX ORDER — BENAZEPRIL HYDROCHLORIDE 40 MG/1
40 TABLET ORAL DAILY
Qty: 30 TABLET | Refills: 11 | Status: SHIPPED | OUTPATIENT
Start: 2017-08-30 | End: 2018-05-11 | Stop reason: SDUPTHER

## 2017-08-30 NOTE — PROGRESS NOTES
Chief Complaint   Patient presents with    Seizures        Miguel Moore is a 62 y.o. male with a history of multiple medical diagnoses as listed below that presents for evaluation of stroke. He was hospitalized for stroke in the past and while in the hospital he says that he was told that he had a seizure. Since the time he was hospitalized he has been doing well overall though he has still been dealing with a dense right hemiparesis. He has been dealing with significant muscle spasms in the right arm and leg as well. He has been compliant with all medications since he has been discharged from the hospital.    Interval History  08/30/2017  Since last seen he has been feeling that his strength and his ROM have been improved with physical therapy. He has not had any new problems since he was last seen in clinic. He has not been taking his BP at home often but says that he last checked it a few weeks and felt that it was in the 150s.     PAST MEDICAL HISTORY:  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    Stroke     Urinary tract infection        PAST SURGICAL HISTORY:  History reviewed. No pertinent surgical history.    SOCIAL HISTORY:  Social History     Social History    Marital status: Single     Spouse name: N/A    Number of children: N/A    Years of education: N/A     Occupational History    disabled - former  - dozers, etc      Social History Main Topics    Smoking status: Never Smoker    Smokeless tobacco: Never Used    Alcohol use Not on file    Drug use: Unknown    Sexual activity: Not on file     Other Topics Concern    Not on file     Social History Narrative    No narrative on file       FAMILY HISTORY:  Family History   Problem Relation Age of Onset    Diabetes Mother     Heart disease Mother     Hypertension Mother     Cataracts Mother     No Known Problems Father      Hypertension Sister     No Known Problems Brother     No Known Problems Daughter     No Known Problems Maternal Aunt     No Known Problems Maternal Uncle     No Known Problems Paternal Aunt     No Known Problems Paternal Uncle     No Known Problems Maternal Grandmother     No Known Problems Maternal Grandfather     No Known Problems Paternal Grandmother     No Known Problems Paternal Grandfather     Amblyopia Neg Hx     Blindness Neg Hx     Cancer Neg Hx     Glaucoma Neg Hx     Macular degeneration Neg Hx     Retinal detachment Neg Hx     Strabismus Neg Hx     Stroke Neg Hx     Thyroid disease Neg Hx        ALLERGIES AND MEDICATIONS: updated and reviewed.  Review of patient's allergies indicates:   Allergen Reactions    Penicillins Hives     Current Outpatient Prescriptions   Medication Sig Dispense Refill    atorvastatin (LIPITOR) 80 MG tablet Take 1 tablet (80 mg total) by mouth once daily. 30 tablet 5    baclofen (LIORESAL) 10 MG tablet Take 1 tablet (10 mg total) by mouth 4 (four) times daily. 120 tablet 5    benazepril (LOTENSIN) 40 MG tablet Take 1 tablet (40 mg total) by mouth once daily. 30 tablet 11    clopidogrel (PLAVIX) 75 mg tablet Take 1 tablet (75 mg total) by mouth once daily. 30 tablet 5    [START ON 10/5/2017] difluprednate (DUREZOL) 0.05 % Drop ophthalmic solution Place 1 drop into the right eye 4 (four) times daily. For 30 days 5 mL 1    ergocalciferol (ERGOCALCIFEROL) 50,000 unit Cap Take 1 capsule (50,000 Units total) by mouth every 7 days. After 2 months change to monthly 4 capsule 0    [START ON 10/2/2017] ergocalciferol (ERGOCALCIFEROL) 50,000 unit Cap Take 1 capsule (50,000 Units total) by mouth every 30 days. Maintenance dose 4 capsule 10    furosemide (LASIX) 20 MG tablet Take 1 tablet (20 mg total) by mouth once daily. 30 tablet 5    [START ON 10/2/2017] ILEVRO 0.3 % DrpS Place 1 drop into both eyes once daily. For 30 days 3 mL 1    insulin aspart (NOVOLOG)  100 unit/mL InPn pen Inject 7 Units into the skin 3 (three) times daily with meals. 5 Box 3    [START ON 10/2/2017] ofloxacin (OCUFLOX) 0.3 % ophthalmic solution Place 1 drop into the right eye 4 (four) times daily. 5 mL 1    phenytoin (DILANTIN) 100 MG ER capsule Take 1 capsule (100 mg total) by mouth 3 (three) times daily. 90 capsule 5    tamsulosin (FLOMAX) 0.4 mg Cp24 Take 2 capsules (0.8 mg total) by mouth once daily. Increased dose. 60 capsule 5     No current facility-administered medications for this visit.        Review of Systems   Constitutional: Negative for activity change, fatigue and unexpected weight change.   HENT: Negative for trouble swallowing and voice change.    Eyes: Negative for photophobia, pain and visual disturbance.   Respiratory: Negative for apnea and shortness of breath.    Cardiovascular: Negative for chest pain and palpitations.   Gastrointestinal: Negative for constipation, nausea and vomiting.   Genitourinary: Negative for difficulty urinating.   Musculoskeletal: Positive for gait problem, myalgias and neck stiffness. Negative for arthralgias, back pain and neck pain.   Skin: Negative for color change and rash.   Neurological: Positive for facial asymmetry, speech difficulty, weakness and numbness. Negative for dizziness, seizures, syncope, light-headedness and headaches.   Psychiatric/Behavioral: Negative for agitation, behavioral problems and confusion.       Neurologic Exam     Mental Status   Oriented to person, place, and time.   Registration: recalls 3 of 3 objects.   Attention: normal. Concentration: normal.   Speech: speech is normal   Level of consciousness: alert  Knowledge: good.     Cranial Nerves     CN II   Visual fields full to confrontation.   Right visual field deficit: none  Left visual field deficit: none     CN III, IV, VI   Pupils are equal, round, and reactive to light.  Extraocular motions are normal.   Right pupil: Size: 3 mm. Shape: regular.  Accommodation: intact.   Left pupil: Size: 3 mm. Shape: regular. Accommodation: intact.   CN III: no CN III palsy  CN VI: no CN VI palsy  Nystagmus: none   Diplopia: none  Ophthalmoparesis: none  Upgaze: normal  Downgaze: normal  Conjugate gaze: present    CN V   Facial sensation intact.   Right facial sensation deficit: none  Left facial sensation deficit: none    CN VII   Facial expression full, symmetric.   Right facial weakness: central  Left facial weakness: none    CN VIII   CN VIII normal.     CN IX, X   CN IX normal.   CN X normal.   Palate: symmetric    CN XI   CN XI normal.   Right sternocleidomastoid strength: weak  Left sternocleidomastoid strength: normal  Right trapezius strength: weak  Left trapezius strength: normal    CN XII   CN XII normal.   Tongue deviation: right    Motor Exam   Muscle bulk: normal  Overall muscle tone: normal  Right arm tone: normal  Left arm tone: normal  Right leg tone: normal  Left leg tone: normal    Strength   Right deltoid: 2/5  Right biceps: 2/5  Right triceps: 2/5  Right wrist flexion: 2/5  Right wrist extension: 2/5  Right interossei: 2/5  Right iliopsoas: 3/5  Right quadriceps: 4/5  Right hamstrin/5  Right anterior tibial: 3/5  Right posterior tibial: 3/5  Right peroneal: 3/5  Right gastroc: 3/5    Sensory Exam   Right arm light touch: normal  Left arm light touch: normal  Right leg light touch: normal  Left leg light touch: normal  Right arm vibration: normal  Left arm vibration: normal  Right leg vibration: normal  Left leg vibration: normal  Right arm proprioception: normal  Left arm proprioception: normal  Right leg proprioception: normal  Left leg proprioception: normal  Right arm pinprick: normal  Left arm pinprick: normal  Right leg pinprick: normal  Left leg pinprick: normal    Gait, Coordination, and Reflexes     Gait  Gait: normal    Coordination   Romberg: negative  Finger to nose coordination: normal  Heel to shin coordination: normal  Tandem walking  "coordination: normal    Tremor   Resting tremor: absent    Reflexes   Right brachioradialis: 2+  Left brachioradialis: 2+  Right biceps: 2+  Left biceps: 2+  Right triceps: 2+  Left triceps: 2+  Right patellar: 2+  Left patellar: 2+  Right achilles: 2+  Left achilles: 2+  Right plantar: normal  Left plantar: normal      Physical Exam   Constitutional: He is oriented to person, place, and time. He appears well-developed and well-nourished.   HENT:   Head: Normocephalic and atraumatic.   Eyes: EOM are normal. Pupils are equal, round, and reactive to light.   Cardiovascular: Intact distal pulses.    Pulmonary/Chest: Effort normal. No respiratory distress.   Musculoskeletal: Normal range of motion.   Neurological: He is alert and oriented to person, place, and time. He has a normal Finger-Nose-Finger Test, a normal Heel to Shin Test, a normal Romberg Test and a normal Tandem Gait Test. Gait normal.   Reflex Scores:       Tricep reflexes are 2+ on the right side and 2+ on the left side.       Bicep reflexes are 2+ on the right side and 2+ on the left side.       Brachioradialis reflexes are 2+ on the right side and 2+ on the left side.       Patellar reflexes are 2+ on the right side and 2+ on the left side.       Achilles reflexes are 2+ on the right side and 2+ on the left side.  Skin: Skin is warm and dry.   Psychiatric: He has a normal mood and affect. His speech is normal and behavior is normal.       Vitals:    08/30/17 1059   BP: (!) 165/92   BP Location: Left arm   Patient Position: Sitting   BP Method: Large (Automatic)   Pulse: 91   Weight: 86.9 kg (191 lb 9.3 oz)   Height: 5' 8" (1.727 m)       Assessment & Plan:    Problem List Items Addressed This Visit     History of stroke with residual effects - Primary (Chronic)    Overview     1/2015 Carotid Ultrasound showed no hemodynamic significant stenosis.  1/2015: TTE showed EF > 55%, normal diastolic filling, and negative bubble study.  1/2015: MRI Brain:Focal " early subacute infarct along the deep cerebral white matter in the left periventricular corona radiata without evidence of acute bleed. Moderate dilation of the lateral ventricles without focal abnormal signal abnormality, this may represent chronic diffuse cerebral white matter atrophy, normal-pressure hydrocephalus is not excluded.  1/2015: MRA Brain: Mild to moderate (50-60% narrowing) of the M1 segment of the left MCA.   1/2015: CTA of head and neck: No hemodynamically significant lesions are observed vessels of the neck. There is moderate stenoses in the cavernous segment of the left internal carotid artery, the M1 segment of the left middle cerebral artery, and M2 segment of the right middle cerebral artery. No major intracranial vessel occlusion is identified.  S/p stenting of the left ICA by patient report         Relevant Medications    benazepril (LOTENSIN) 40 MG tablet    Seizure disorder as sequela of cerebrovascular accident (Chronic)    Hemiplegia and hemiparesis following cerebral infarction affecting right dominant side (Chronic)      Other Visit Diagnoses     Essential hypertension        Relevant Medications    benazepril (LOTENSIN) 40 MG tablet          Follow-up: Return in about 3 months (around 11/30/2017).   More than 50% of this 25 minute encounter was spent in counseling and coordinating care.

## 2017-08-31 ENCOUNTER — CLINICAL SUPPORT (OUTPATIENT)
Dept: REHABILITATION | Facility: HOSPITAL | Age: 62
End: 2017-08-31
Attending: NEUROLOGICAL SURGERY
Payer: MEDICARE

## 2017-08-31 DIAGNOSIS — M62.89 MUSCLE TIGHTNESS: ICD-10-CM

## 2017-08-31 DIAGNOSIS — Z74.09 IMPAIRED FUNCTIONAL MOBILITY, BALANCE, GAIT, AND ENDURANCE: ICD-10-CM

## 2017-08-31 DIAGNOSIS — R53.1 RIGHT SIDED WEAKNESS: ICD-10-CM

## 2017-08-31 DIAGNOSIS — I69.359 HEMIPARESIS AFFECTING DOMINANT SIDE AS LATE EFFECT OF CEREBROVASCULAR ACCIDENT: Primary | ICD-10-CM

## 2017-08-31 PROCEDURE — G8988 SELF CARE GOAL STATUS: HCPCS | Mod: CK,PN

## 2017-08-31 PROCEDURE — G8987 SELF CARE CURRENT STATUS: HCPCS | Mod: CK,PN

## 2017-08-31 PROCEDURE — 97110 THERAPEUTIC EXERCISES: CPT | Mod: PN

## 2017-08-31 PROCEDURE — 97112 NEUROMUSCULAR REEDUCATION: CPT | Mod: PN

## 2017-08-31 NOTE — PROGRESS NOTES
"Name: Miguel Moore  Clinic Number: [id number]  Date of Treatment: 08/31/2017   Diagnosis:   Encounter Diagnoses   Name Primary?    Hemiparesis affecting dominant side as late effect of cerebrovascular accident Yes    Right sided weakness     Muscle tightness     Impaired functional mobility, balance, gait, and endurance        Time in: 1105  Time Out:   Total Treatment Time:  55'  ( 1:1 with PT/PTA for 55' of treatment session)     Visit #  12/ 25       Subjective:    Miguel reports that he is doing okay today.   Patient reports their pain to be 0/10 on a 0-10 scale with 0 being no pain and 10 being the worst pain imaginable.       Objective    Miguel presents to therapy with SBQC.     Miguel received therapeutic exercises to develop strength, endurance, ROM, flexibility, posture and core stabilization for 30  minutes including:     + Upright bike x 8'  With foot strapped     Nustep x 8' hills level 1   Heel Props with quad sets x 3'   SAQ 2' x 3'' hold    Bridges 2 x 10   HL hip adduction x 2'   BKFO RLE x 2'   SLR 2 x 10   Supine HSS with Strap 2 x 30''   Supine hip flexor stretch 2 x 30''     MATRIX LAQ 10 # 3x5 x 3'' hold at end range  Shuttle RLE only (semi reclined) 1.5 cords    Standing Heel Raises 1 x 10   Standing Marches x 20  Feedback to maintain base of support and focus on heel strike   Standing Hip abduction 2 x 10 ea LE   Step ups 4" 1 x 15 ea LE   Lateral Step Ups 1 x 10 ea LE ( unable to achieve full TKE on RLE)   Standing hamstring stretch 2x30 seconds ea.       Mat Mobility sit<>supine Mod I, sit<>supine Mod I , Rolling L & R Mod I     Patient participated in neuromuscular re-education activities to improve: Balance, Coordination and Posture for 30 minutes. The following activities were included:     Sit<>Stand 2 x 10 - LUE on RLE for push-off, YTB used for TC  Gait training x 60' without SBQC in // bars and verbal cueing to increase step length and hip/knee flexion  Large stepping forward " and return, as well as side and return x10 ea. In // bars  Kelly Taps (level 1) 2 x 10 ea LE ( cueing for trunk compensation on the RLE)   Lateral Kelly Step -Overs 1 x 15 ea LE   Fwd Kelly step over 1 x 15 ea LE - Tactile facilitation to the R hip flexors)    Tandem walking 1 lap  Retro walking 3 lap  Side stepping x2 laps  Fwd Waling with focus on exaggerated step lengths x 5 laps     Written Home Exercises reviewed.   Pt demo good understanding of the education provided. Miguel demonstrated good return demonstration of activities.     Assessment:   Mr. Rivera tolerated treatment session well.  Pt able to perform up right bike today with reverse revolutions , however required TC/VC for control of RLE.  Pt required moderate feedback to maintain base of support and neuromuscular control of the RLE with standing marches.  Pt unable to perform plantarflexion of the right foot in seated position.         Pt able to complete all neuromuscular re-education exercises without tactile facilitation this session, yet continues to demonstrate compensatory movements due to lack of mobility from muscle tone in the RLE. Pt ambulating with improved step length bilaterally, however, lacks L hip extension requiring increased knee flexion at end stance phase, and decreased dorsiflexion of the R ankle with limitation in heel toe pattern.      Pt will continue to benefit from skilled PT intervention. Medical Necessity is demonstrated by:  Fall Risk, Continued inability to participate in vocational pursuits, Unable to participate fully in daily activities, Requires skilled supervision to complete and progress HEP and Weakness.    Patient is making fair progress towards established goals.       GOALS: Short Term Goals:  4 weeks  1. Pt will demonstrate bed mobility without PT assist for ease with bed mobility at home met  2. Pt to increase B popliteal angle by > or = 10 degrees in order to improve flexibility and posture. met  3.  Increased MMT  to  3/5 dorsiflexion  to increase tolerance for ADL and work activities. In progress  4. Pt will demonstrate improved TUG score by 3 seconds for ease with functional mobility met  5. Pt to tolerate HEP to improve ROM and independence with ADL's met      Plan:  Continue with established Plan of Care towards PT goals.     Kaia Parada, PTA , Gogo Katz, PT

## 2017-08-31 NOTE — PROGRESS NOTES
Patient:  Miguel Moore  Clinic #:  [id number]   Date of Note: 08/31/2017   Referring Physician:  Geovany Rucker MD  Diagnosis:    Encounter Diagnoses   Name Primary?    Hemiparesis affecting dominant side as late effect of cerebrovascular accident Yes    Right sided weakness         Start Time: 11:15 am  End Time: 11:50 pm  Total Time: 35 min  Group Time: -    4 of 20 visits  Exp 12/31/17    Subjective:   Pt states he is gotta go he has another appointment at 12.  Pt reports that he has been doing his exercises at home.   Pain: no pain reported today    Objective:   Patient seen by OT this session. Treatment  consist of the following:  Neuromuscular re-education    Treatment: Neuromuscular re-education x 35 min   Pt was engaged in wgt bearing onto RUE with simultaneous digital stretching.  Standing with wgt bearing on ball with movement of arm into forward and lateral movements.  Pt was engaged in supine chest press with dowel, manual cues for more efficient technique x 10 reps.  Pt engaged in left hand hold on right wrist for stretching into shoulder flexion, verbal cues to sustain elbow in extension during  Motion x 10 reps. Pt rec'd stretching of right shoulder into all planes and elbow into flex/ext by therapist with some crepitus noted with rotation and elbow extension.  Pt was encouraged to continue with his HEP.    Plan of care rec'd from Dr. Rucker for care from 7/20/17 to 9/14/17.    Assessment:  Pt with reports of feeling more flexible after therapy session. Pt will continue to benefit from skilled OT intervention. Medical necessity is demonstrated by: Pt continues to be limited in functional and leisurely pursuits. Pain limits patients participation in ADL's. Pt is not able to carryout necessary vocational tasks. Patient continues to requires cues and skilled supervision to complete HEP    Plan:  Continue with plan of care 1x week x 8 weeks  during the certification period from   7/20/17  to  9/14/17  in pursuit of  OT goals.    Patient:  Miguel Moore  [facility] #:  [id number]   Date of Note: 08/31/2017   Referring Physician:  Geovany Rucker MD  Diagnosis:    Encounter Diagnoses   Name Primary?    Hemiparesis affecting dominant side as late effect of cerebrovascular accident Yes    Right sided weakness         Start Time: 10:20 am  End Time: 11:00 am  Total Time: 40 min  Group Time: -  5 of 20 visits  Exp 12/31/17    Subjective:   Pt states he is feeling a little tight today.   Pain: no pain reported today    Objective:   Patient seen by OT this session. Treatment  consist of the following:  Neuromuscular re-education    Treatment: Neuromuscular re-education x 40 min   Pt was engaged in wgt bearing onto RUE with simultaneous digital stretching onto firm surface, increased tone with digital stretch.  RUE stretching attempted in seated position with large therapy ball, increased tone noted with stretching attempts. Pt rec'd stretching of RUE in supine inclusive of shoulder and elbow.  A decrease in tonal resistance with stretching in supine.  Pt engaged in left hand hold on right wrist for stretching into shoulder flexion, verbal cues to sustain elbow in extension during motion x 10 reps. Wgt bearing onto RUE elbow/forearm in sidelying position performed x 30 second interval x 2. Pt was engaged in active flexion and extension of right elbow facilitation to his full extension required. Pt able to flex in a controlled manner x 10 reps.  Pt encouraged to continue with his HEP.    Plan of care rec'd from Dr. Rucker for care from 7/20/17 to 9/14/17.    Assessment:  Pt with increase in tone today of RUE although reduced after therapy. Pt continues with posturing of RUE while ambulating  Pt will continue to benefit from skilled OT intervention. Medical necessity is demonstrated by: Pt continues to be limited in functional and leisurely pursuits. Pain limits patients participation in ADL's. Pt is not able to  carryout necessary vocational tasks. Patient continues to requires cues and skilled supervision to complete HEP    FOTO  Results   G CODE TOOL: FOTO  Self care  Current Score  = 60 or 40% impaired   Goal at Discharge Score  57 or 43% impaired    Met     New goal for QRL UE  Self care  Current Score  = 46 or 54% impaired   Goal at Discharge Score 50 or 50% impaired     Plan:  Continue with plan of care 1x week x 8 weeks  during the certification period from   7/20/17  to 9/14/17  in pursuit of  OT goals.

## 2017-09-06 ENCOUNTER — CLINICAL SUPPORT (OUTPATIENT)
Dept: REHABILITATION | Facility: HOSPITAL | Age: 62
End: 2017-09-06
Attending: NEUROLOGICAL SURGERY
Payer: COMMERCIAL

## 2017-09-06 DIAGNOSIS — I69.359 HEMIPARESIS AFFECTING DOMINANT SIDE AS LATE EFFECT OF CEREBROVASCULAR ACCIDENT: Primary | ICD-10-CM

## 2017-09-06 DIAGNOSIS — Z74.09 IMPAIRED FUNCTIONAL MOBILITY, BALANCE, GAIT, AND ENDURANCE: ICD-10-CM

## 2017-09-06 DIAGNOSIS — M62.89 MUSCLE TIGHTNESS: ICD-10-CM

## 2017-09-06 DIAGNOSIS — I69.351 HEMIPLEGIA AND HEMIPARESIS FOLLOWING CEREBRAL INFARCTION AFFECTING RIGHT DOMINANT SIDE: ICD-10-CM

## 2017-09-06 DIAGNOSIS — I69.30 HISTORY OF STROKE WITH RESIDUAL EFFECTS: ICD-10-CM

## 2017-09-06 DIAGNOSIS — I69.359 HEMIPARESIS AFFECTING DOMINANT SIDE AS LATE EFFECT OF CEREBROVASCULAR ACCIDENT: ICD-10-CM

## 2017-09-06 DIAGNOSIS — R53.1 RIGHT SIDED WEAKNESS: ICD-10-CM

## 2017-09-06 PROCEDURE — 97110 THERAPEUTIC EXERCISES: CPT | Mod: PN

## 2017-09-06 PROCEDURE — 97112 NEUROMUSCULAR REEDUCATION: CPT | Mod: PN

## 2017-09-06 NOTE — PROGRESS NOTES
"Name: Miguel Moore  Clinic Number: [id number]  Date of Treatment: 09/06/2017   Diagnosis:   Encounter Diagnoses   Name Primary?    Hemiparesis affecting dominant side as late effect of cerebrovascular accident Yes    Right sided weakness     Muscle tightness     Impaired functional mobility, balance, gait, and endurance        Time in: 1100  Time Out:  1150  Total Treatment Time:  50'  ( 1:1 with PTA for 30' of treatment session)     Visit #  13/ 25       Subjective:    Miguel reports that he is doing okay today.   Patient reports their pain to be 0/10 on a 0-10 scale with 0 being no pain and 10 being the worst pain imaginable.       Objective    Miguel presents to therapy with SBQC.     Miguel received therapeutic exercises to develop strength, endurance, ROM, flexibility, posture and core stabilization for 20  minutes including:     Upright bike x 8'  With foot strapped     Nustep x 8' hills level 1   Heel Props with quad sets x 3'   SAQ 2' x 3'' hold    Bridges 2 x 10   HL hip adduction x 2'   BKFO RLE x 2'   SLR 2 x 10   Supine HSS with Strap 2 x 30''   Supine hip flexor stretch 2 x 30''     MATRIX LAQ 10 # 3x5 x 3'' hold at end range  Shuttle RLE only (semi reclined) 1.5 cords    Standing Heel Raises 1 x 10   Standing Marches x 20  Feedback to maintain base of support and focus on heel strike   Standing Hip abduction 2 x 10 ea LE   Step ups 4" 1 x 15 ea LE   Lateral Step Ups 1 x 10 ea LE ( unable to achieve full TKE on RLE)   Standing hamstring stretch 2x30 seconds ea.       Mat Mobility sit<>supine Mod I, sit<>supine Mod I , Rolling L & R Mod I     Patient participated in neuromuscular re-education activities to improve: Balance, Coordination and Posture for 30 minutes. The following activities were included:     Sit<>Stand 2 x 10 - LUE on RLE for push-off, YTB used for TC  Gait training x 60' without SBQC in // bars and verbal cueing to increase step length and hip/knee flexion  Large stepping forward " and return, as well as side and return x10 ea. In // bars  Kelly Taps (level 1) 2 x 10 ea LE ( cueing for trunk compensation on the RLE)   Lateral Kelly Step -Overs 1 x 15 ea LE   Fwd Kelly step over 1 x 15 ea LE - Tactile facilitation to the R hip flexors)    Tandem walking 1 lap  Retro walking 3 lap  Side stepping x2 laps  Fwd Waling with focus on exaggerated step lengths x 5 laps     Written Home Exercises reviewed.   Pt demo good understanding of the education provided. Miguel demonstrated good return demonstration of activities.     Assessment:   Mr. Rivera tolerated treatment session well.  Patient continues to lack endurance to perform standing exercises without rest durations.  Pt able to complete all neuromuscular re-education exercises without tactile facilitation this session, yet continues to demonstrate compensatory movements due to lack of mobility from muscle tone in the RLE.  Pt ambulating with improved step length bilaterally, however, lacks L hip extension requiring increased knee flexion at end stance phase, and decreased dorsiflexion of the R ankle with limitation in heel toe pattern.  Pt will continue to benefit from skilled PT intervention. Medical Necessity is demonstrated by:  Fall Risk, Continued inability to participate in vocational pursuits, Unable to participate fully in daily activities, Requires skilled supervision to complete and progress HEP and Weakness.    Patient is making fair progress towards established goals.       GOALS: Short Term Goals:  4 weeks  1. Pt will demonstrate bed mobility without PT assist for ease with bed mobility at home met  2. Pt to increase B popliteal angle by > or = 10 degrees in order to improve flexibility and posture. met  3. Increased MMT  to  3/5 dorsiflexion  to increase tolerance for ADL and work activities. In progress  4. Pt will demonstrate improved TUG score by 3 seconds for ease with functional mobility met  5. Pt to tolerate HEP to  improve ROM and independence with ADL's met      Plan:  Continue with established Plan of Care towards PT goals.     Kaia Parada, PTA

## 2017-09-06 NOTE — PROGRESS NOTES
Patient:  Miguel Moore  Clinic #:  [id number]   Date of Note: 09/06/2017   Referring Physician:  Geovany uRcker MD  Diagnosis:    Encounter Diagnoses   Name Primary?    Hemiparesis affecting dominant side as late effect of cerebrovascular accident     Hemiplegia and hemiparesis following cerebral infarction affecting right dominant side     History of stroke with residual effects         Start Time: 1000 am  End Time: 1050pm  Total Time: 50 min  Group Time: -    5 of 20 visits  Exp 12/31/17    Subjective:   Pt states he is doing pretty good today. Right arm is tight.  Pain: no pain reported today    Objective:   Patient seen by OT this session. Treatment  consist of the following:  Neuromuscular re-education    Treatment: there ex 30 and Neuromuscular re-education x 20min    Patient ambulated into gym with cane TF to supine for PROM to R UR shoulder, elbow wrist and hand. Low load prolonged stretch at end ROM; supine chest press with dowel, manual cues for more efficient technique 2 x 10 reps.  Pt engaged in left hand hold on right wrist for stretching into shoulder flexion, verbal cues to sustain elbow in extension during  Motion x 10 reps. Sitting weight bearing on R UE reaching with L to decrease flexor tone R UE. Standing reaching over swiss ball with hand over hand for stability performed protraction retraction and lateral movements.  Pt was encouraged to continue with his HEP.    Plan of care rec'd from Dr. Rucker for care from 7/20/17 to 9/14/17.    Assessment:  Pt with reports of feeling more flexible after therapy session, but still with significant increased flexor tone in R hand and wrist. Pt will continue to benefit from skilled OT intervention. Medical necessity is demonstrated by: Pt continues to be limited in functional and leisurely pursuits. Pain limits patients participation in ADL's. Pt is not able to carryout necessary vocational tasks. Patient continues to requires cues and skilled  supervision to complete HEP    Plan:  Continue with plan of care 1x week x 8 weeks  during the certification period from   7/20/17  to 9/14/17  in pursuit of  OT goals.    KAREN Rodriges

## 2017-09-11 ENCOUNTER — CLINICAL SUPPORT (OUTPATIENT)
Dept: REHABILITATION | Facility: HOSPITAL | Age: 62
End: 2017-09-11
Attending: NEUROLOGICAL SURGERY
Payer: COMMERCIAL

## 2017-09-11 DIAGNOSIS — M62.89 MUSCLE TIGHTNESS: ICD-10-CM

## 2017-09-11 DIAGNOSIS — Z74.09 IMPAIRED FUNCTIONAL MOBILITY, BALANCE, GAIT, AND ENDURANCE: ICD-10-CM

## 2017-09-11 PROBLEM — R53.1 RIGHT SIDED WEAKNESS: Status: ACTIVE | Noted: 2017-09-11

## 2017-09-11 PROCEDURE — 97112 NEUROMUSCULAR REEDUCATION: CPT | Mod: PN

## 2017-09-11 PROCEDURE — 97110 THERAPEUTIC EXERCISES: CPT | Mod: PN

## 2017-09-11 NOTE — PROGRESS NOTES
"Name: Miguel Moore  Clinic Number: [id number]  Date of Treatment: 09/11/2017   Diagnosis:   Encounter Diagnoses   Name Primary?    Hemiparesis affecting dominant side as late effect of cerebrovascular accident Yes    History of stroke with residual effects     Muscle tightness     Right sided weakness     Impaired functional mobility, balance, gait, and endurance        Time in: 1100  Time Out:  1154  Total Treatment Time:  54'  ( 1:1 with PTA for 54' of treatment session)     Visit #  13/ 25       Subjective:    Miguel reports that he is doing okay today.   Patient reports their pain to be 0/10 on a 0-10 scale with 0 being no pain and 10 being the worst pain imaginable.       Objective    Miguel presents to therapy with SBQC.     Miguel received therapeutic exercises to develop strength, endurance, ROM, flexibility, posture and core stabilization for 25  minutes including:     Upright bike x 8'  With foot strapped     Nustep x 8' hills level 1   Heel Props with quad sets x 3'   SAQ 2' x 3'' hold    Bridges 2 x 10   HL hip adduction x 2'   BKFO RLE x 2'   SLR 2 x 10   Supine HSS with Strap 2 x 30''   Supine hip flexor stretch 2 x 30''     MATRIX LAQ 10 # 3x5 x 3'' hold at end range  Shuttle RLE only (semi reclined) 1.5 cords    Standing Heel Raises 1 x 10   Standing Marches x 20  Feedback to maintain base of support and focus on heel strike   Squats with WBOS 2x10  Standing Hip abduction 2 x 10 ea LE   Step ups 4" 1 x 15 ea LE   Lateral Step Ups 1 x 10 ea LE ( unable to achieve full TKE on RLE)   Standing hamstring stretch 2x30 seconds ea.       Mat Mobility sit<>supine Mod I, sit<>supine Mod I , Rolling L & R Mod I     Patient participated in neuromuscular re-education activities to improve: Balance, Coordination and Posture for 30 minutes. The following activities were included:     Sit<>Stand x5 from standard chair- heavy cueing for anterior weight shift  Gait training x 60' without SBQC in // bars and " verbal cueing to increase step length and hip/knee flexion  Large stepping forward and return, as well as side and return x10 ea. In // bars  Kelly Taps (level 1) 2 x 10 ea LE ( cueing for trunk compensation on the RLE)   Lateral Kelly Step -Overs 1 x 15 ea LE   Fwd Kelly step over 1 x 15 ea LE - Tactile facilitation to the R hip flexors)    Tandem walking 1 lap  Retro walking 3 lap  Side stepping x2 laps  Fwd [place] with focus on exaggerated step lengths x 5 laps     Written Home Exercises reviewed.   Pt demo good understanding of the education provided. Miguel demonstrated good return demonstration of activities.     Assessment:   Mr. Rivera tolerated treatment session well. Pt with good tolerance to progression of step ups this session, yet requires heavy UE assist to achieve full BLE extension secondary to BLE weakness. Pt continues to demonstrate improvements in ambulation achieving adequate step length  Bilaterally, yet with continues with compensatory movements as described previously.  Pt will continue to benefit from skilled PT intervention. Medical Necessity is demonstrated by:  Fall Risk, Continued inability to participate in vocational pursuits, Unable to participate fully in daily activities, Requires skilled supervision to complete and progress HEP and Weakness.    Patient is making fair progress towards established goals.       GOALS: Short Term Goals:  4 weeks  1. Pt will demonstrate bed mobility without PT assist for ease with bed mobility at home met  2. Pt to increase B popliteal angle by > or = 10 degrees in order to improve flexibility and posture. met  3. Increased MMT  to  3/5 dorsiflexion  to increase tolerance for ADL and work activities. In progress  4. Pt will demonstrate improved TUG score by 3 seconds for ease with functional mobility met  5. Pt to tolerate HEP to improve ROM and independence with ADL's met      Plan:  Continue with established Plan of Care towards PT goals.      Gogo Katz, PT

## 2017-09-14 ENCOUNTER — TELEPHONE (OUTPATIENT)
Dept: OPHTHALMOLOGY | Facility: CLINIC | Age: 62
End: 2017-09-14

## 2017-09-14 NOTE — TELEPHONE ENCOUNTER
----- Message from Basilia Larkin sent at 9/14/2017  1:16 PM CDT -----  Contact: Miguel Moore   Pt would like to speak with  nurse about the eye surgery pt can be reached at [phone] please thanks.

## 2017-09-19 ENCOUNTER — TELEPHONE (OUTPATIENT)
Dept: OPHTHALMOLOGY | Facility: CLINIC | Age: 62
End: 2017-09-19

## 2017-09-19 DIAGNOSIS — H25.11 NS (NUCLEAR SCLEROSIS), RIGHT: Primary | ICD-10-CM

## 2017-09-21 ENCOUNTER — TELEPHONE (OUTPATIENT)
Dept: OPHTHALMOLOGY | Facility: CLINIC | Age: 62
End: 2017-09-21

## 2017-09-21 DIAGNOSIS — H25.12 NUCLEAR SCLEROSIS, LEFT: Primary | ICD-10-CM

## 2017-09-22 ENCOUNTER — TELEPHONE (OUTPATIENT)
Dept: OPHTHALMOLOGY | Facility: CLINIC | Age: 62
End: 2017-09-22

## 2017-09-26 ENCOUNTER — TELEPHONE (OUTPATIENT)
Dept: OPHTHALMOLOGY | Facility: CLINIC | Age: 62
End: 2017-09-26

## 2017-09-26 NOTE — TELEPHONE ENCOUNTER
----- Message from Severo Joy sent at 9/26/2017 11:28 AM CDT -----  Contact: Miguel  Mr. Moore would like for you to contact him. I would like to make sure that he have all the information that he needs for his surgery. He can be reached at [phone]. His surgery is scheduled for the 5th of October.

## 2017-09-26 NOTE — TELEPHONE ENCOUNTER
----- Message from Severo Joy sent at 9/26/2017 12:39 PM CDT -----  Contact: Miguel  Mr. Moore was returning your call. He can be reached at [phone].

## 2017-09-27 ENCOUNTER — CLINICAL SUPPORT (OUTPATIENT)
Dept: REHABILITATION | Facility: HOSPITAL | Age: 62
End: 2017-09-27
Attending: NEUROLOGICAL SURGERY
Payer: COMMERCIAL

## 2017-09-27 DIAGNOSIS — Z74.09 IMPAIRED FUNCTIONAL MOBILITY, BALANCE, GAIT, AND ENDURANCE: ICD-10-CM

## 2017-09-27 DIAGNOSIS — I69.351 HEMIPLEGIA AND HEMIPARESIS FOLLOWING CEREBRAL INFARCTION AFFECTING RIGHT DOMINANT SIDE: Primary | ICD-10-CM

## 2017-09-27 DIAGNOSIS — I69.359 HEMIPARESIS AFFECTING DOMINANT SIDE AS LATE EFFECT OF CEREBROVASCULAR ACCIDENT: ICD-10-CM

## 2017-09-27 DIAGNOSIS — R53.1 RIGHT SIDED WEAKNESS: ICD-10-CM

## 2017-09-27 DIAGNOSIS — M62.89 MUSCLE TIGHTNESS: ICD-10-CM

## 2017-09-27 PROCEDURE — 97110 THERAPEUTIC EXERCISES: CPT | Mod: PN

## 2017-09-27 PROCEDURE — 97112 NEUROMUSCULAR REEDUCATION: CPT | Mod: PN

## 2017-09-27 PROCEDURE — G8980 MOBILITY D/C STATUS: HCPCS | Mod: CJ,PN

## 2017-09-27 PROCEDURE — G8979 MOBILITY GOAL STATUS: HCPCS | Mod: CK,PN

## 2017-09-27 NOTE — PROGRESS NOTES
Occupational Therapy Progress Note and D/C Summary    Patient:  Miguel Moore  [facility] #:  [id number]   Date of Note: 09/27/2017   Referring Physician:  Geovany Rucker MD  Diagnosis:    Encounter Diagnoses   Name Primary?    Hemiplegia and hemiparesis following cerebral infarction affecting right dominant side Yes    Right sided weakness         Start Time: 10:35 am  End Time: 11:15 am  Total Time: 40 min  Group Time: -    7 of 20 visits  Exp 12/31/17    Subjective:   Pt states he had a good birthday.  Pt states he didn't come last week to therapy because it was his birthday.  Pt states he has been sleeping on his side and leaning on his right side for weight bearing.  Pain: no pain reported today    Objective:   Patient seen by OT this session. Treatment  consist of the following:  Neuromuscular re-education    Treatment: Neuromuscular re-education x 40 min      Joint Evaluation AROM  PROM     right Right Left Right   Shoulder flex 0-180 20(-5)    90   Shoulder Abd 0-180 35(+10)   90   Shoulder ER 0-90 30(+10)   40   Shoulder IR 0-90 40(=)   45   Shoulder Extension 0-80 20(=)   40   Shoulder Horizontal adduction 0-90 10(=)   15   Elbow flex/ext 0-150 100(+10)/-55(=)   110/-30   Wrist flex 0-80 0   30   Wrist ext 0-70 0   10   Supination 0-80 -40(=)   15   Pronation 0-80 full   80      Pt demonstrated stretching of right arm across table with right finger over edge of table and left placed on top to keep in place.  Stretch held x 4 min with a reduction of right arm tone after releasing stretch.  Pt performed supine hand hold hand stretch into shoulder flexion with good technique x 5 reps.  Pt encouraged to continue with his stretching at home consistently to keep arm flexible for activities such as dressing.        Patient is a 62 y.o. male referred to Occupational Therapy by  with a referral for brittany and treat.  Patient received initial evaluation on 7/20/17 and returned for 6 treatment sessions.  Patient had 3 missed visits due to transportation issues.  Patient's treatment included education re: stretching and wgt bearing and recovery time from strokes, the importance of stretching daily to keep flexible and wgt bearing to help tone.    Assessment:  Pt is demonstrating minimal changes in AROM of right shoulder but is able to demonstrate stretching of right arm in seated and supine positions.  Pt has been inconsistent with performance of his exercises at home.    GOALS:  Time frame: 8 weeks  Competence in HEP to increase proprioceptive input into RUE to reduce tone- met  Pt will demonstrate stretches in clinic for RUE with 90% accuracy- met but is not performed consistently    G CODE TOOL: FOTO  Self car  Goal at Discharge Score 57 or 43% impaired   met  Discharge status Score = 60 or 40% impaired        Plan:  Pt will be d/c from formal occupational therapy at this time due to meeting established goals.

## 2017-09-27 NOTE — PROGRESS NOTES
"Name: Miguel Moore  Clinic Number: [id number]  Date of Treatment: 09/27/2017   Diagnosis:   Encounter Diagnoses   Name Primary?    Muscle tightness     Impaired functional mobility, balance, gait, and endurance     Hemiparesis affecting dominant side as late effect of cerebrovascular accident        Time in: 1125  Time Out:  1200  Total Treatment Time:  35'  ( 1:1 with PT for 35' of treatment session)     Visit #  13/ 25       Subjective:    Miguel reports that he is doing okay today.   Patient reports their pain to be 0/10 on a 0-10 scale with 0 being no pain and 10 being the worst pain imaginable.       Objective    Lower Extremity Strength  Right LE   Left LE     Hip flexion:  4+/5 Hip flexion: 5/5   Knee extension: 4+/5 Knee extension: 5/5   Knee flexion: 4-/5 Knee flexion: 5/5   Ankle dorsiflexion:  2+/5 Ankle dorsiflexion: 5/5   Ankle plantarflexion:  3/5 Ankle plantarflexion: 5/5   Hip abduction: 4/5 Hip abduction: 4+/5   Hip adduction: 4+/5 Hip adduction 5/5   Hip extension: 2+/5 Hip extension: 4/5     Functional Strength:                        -30 sec sit to stand: 9    Functional Limitations Reports - G Codes  Category: Mobility  Tool: FOTO  Score: 38% limitation  Current:  CJ 20-40%  Goal:  CK 40-60%    Miguel presents to therapy with SBQC.     Miguel received therapeutic exercises to develop strength, endurance, ROM, flexibility, posture and core stabilization for 25  minutes including:   MATRIX LAQ 10 # 3x5 x 3'' hold at end range  Shuttle RLE only (semi reclined) 1.5 cords    Standing Heel Raises 1 x 10   Standing Marches x 20  Feedback to maintain base of support and focus on heel strike   Squats with WBOS 2x10  Standing Hip abduction 2 x 10 ea LE   Step ups 4" 1 x 15 ea LE   Lateral Step Ups 1 x 10 ea LE ( unable to achieve full TKE on RLE)   Standing hamstring stretch 2x30 seconds ea.     Patient participated in neuromuscular re-education activities to improve: Balance, " Coordination and Posture for 10 minutes. The following activities were included:   Large stepping forward and return, as well as side and return x20 ea. In // bars      Written Home Exercises reviewed.   Pt demo good understanding of the education provided. Miguel demonstrated good return demonstration of activities.     Assessment:   Mr. Rivera tolerated treatment session well. Discussed with pt that he has been tolerating his therapy sessions well, however, upon re-assessment, pt demonstrates maintenance of objective measures from previous assessments. Pts functional mobility has significantly improved throughout his treatment episode. Pt has achieved all of his short and long term goals with the exception of improvements in popliteal angle, likely secondary to tone. Pt has achieved maximum benefit from skilled PT services at this time and no longer requires skilled PT services. Pt is now discharged.      GOALS: Short Term Goals:  4 weeks  1. Pt will demonstrate bed mobility without PT assist for ease with bed mobility at home met  2. Pt to increase B popliteal angle by > or = 10 degrees in order to improve flexibility and posture. met  3. Increased MMT  to  3/5 dorsiflexion  to increase tolerance for ADL and work activities. met  4. Pt will demonstrate improved TUG score by 3 seconds for ease with functional mobility met  5. Pt to tolerate HEP to improve ROM and independence with ADL's met  Long Term Goals: 8 weeks  1. Pt will demonstrate sit to stand with minimal use of LUE for ease with transfers met  2 .Pt to increase B popliteal angle by > or = 20 degrees in order to improve flexibility and posture.  met  3. Increased MMT  To 4- in the RLE  to increase tolerance for ADL and work activities. Not met  4. Pt will demonstrate 5 sit to stands on 30 seconds sit to stand test for improvements in endurance and strength met  5. Pt to be Independent with HEP to improve ROM and independence with ADL's met  6. Pt will  score <44% limitation on the FOTO for ease with return to participation within the community met       Plan:  Pt is discharged from skilled PT services.     Gogo Katz, PT

## 2017-09-28 DIAGNOSIS — Z86.73 HISTORY OF STROKE: ICD-10-CM

## 2017-09-28 RX ORDER — CLOPIDOGREL BISULFATE 75 MG/1
TABLET ORAL
Qty: 30 TABLET | Refills: 5 | Status: SHIPPED | OUTPATIENT
Start: 2017-09-28 | End: 2018-04-01 | Stop reason: SDUPTHER

## 2017-09-29 NOTE — PRE ADMISSION SCREENING
RN Phone Pre op done 9-29-17.  Instructed to remain NPO after midnight prior to surgery, except to take Dilantin as directed.  Expressed understanding of instructions.  Arrival time 06:30 AM.

## 2017-10-01 NOTE — H&P
[facility]  History & Physical    Subjective:      Chief Complaint/Reason for Admission:     Miguel Moore is a 62 y.o. male.    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    Stroke     Urinary tract infection      Past Surgical History:   Procedure Laterality Date    FEMORAL ARTERY STENT      KNEE SURGERY      bilateral scope     Family History   Problem Relation Age of Onset    Diabetes Mother     Heart disease Mother     Hypertension Mother     Cataracts Mother     No Known Problems Father     Hypertension Sister     No Known Problems Brother     No Known Problems Daughter     No Known Problems Maternal Aunt     No Known Problems Maternal Uncle     No Known Problems Paternal Aunt     No Known Problems Paternal Uncle     No Known Problems Maternal Grandmother     No Known Problems Maternal Grandfather     No Known Problems Paternal Grandmother     No Known Problems Paternal Grandfather     Amblyopia Neg Hx     Blindness Neg Hx     Cancer Neg Hx     Glaucoma Neg Hx     Macular degeneration Neg Hx     Retinal detachment Neg Hx     Strabismus Neg Hx     Stroke Neg Hx     Thyroid disease Neg Hx      Social History   Substance Use Topics    Smoking status: Never Smoker    Smokeless tobacco: Never Used    Alcohol use No       No prescriptions prior to admission.     Review of patient's allergies indicates:   Allergen Reactions    Penicillins Hives        Review of Systems   Eyes: Positive for blurred vision.   All other systems reviewed and are negative.      Objective:      Vital Signs (Most Recent)       Vital Signs Range (Last 24H):       Physical Exam   Constitutional: He is oriented to person, place, and time. He appears well-developed and well-nourished.   HENT:   Head: Normocephalic.   Eyes: Conjunctivae and EOM are normal. Pupils are equal, round, and reactive to light.    Neck: Normal range of motion. Neck supple.   Cardiovascular: Normal rate.    Pulmonary/Chest: Effort normal and breath sounds normal.   Abdominal: Soft. Bowel sounds are normal.   Musculoskeletal: Normal range of motion.   Neurological: He is alert and oriented to person, place, and time.   Skin: Skin is warm.   Psychiatric: He has a normal mood and affect.       Data Review:   ECG:     Assessment:      Cataract OD.    Plan:    CE OD.

## 2017-10-04 ENCOUNTER — OFFICE VISIT (OUTPATIENT)
Dept: PODIATRY | Facility: CLINIC | Age: 62
End: 2017-10-04
Payer: COMMERCIAL

## 2017-10-04 ENCOUNTER — ANESTHESIA EVENT (OUTPATIENT)
Dept: SURGERY | Facility: HOSPITAL | Age: 62
End: 2017-10-04
Payer: COMMERCIAL

## 2017-10-04 VITALS
DIASTOLIC BLOOD PRESSURE: 90 MMHG | BODY MASS INDEX: 30.3 KG/M2 | HEIGHT: 68 IN | WEIGHT: 199.94 LBS | SYSTOLIC BLOOD PRESSURE: 160 MMHG

## 2017-10-04 DIAGNOSIS — L84 PRE-ULCERATIVE CALLUSES: ICD-10-CM

## 2017-10-04 DIAGNOSIS — M20.42 HAMMER TOES OF BOTH FEET: ICD-10-CM

## 2017-10-04 DIAGNOSIS — M20.31 HALLUX MALLEUS OF RIGHT FOOT: ICD-10-CM

## 2017-10-04 DIAGNOSIS — E11.22 CKD STAGE 3 SECONDARY TO DIABETES: ICD-10-CM

## 2017-10-04 DIAGNOSIS — R53.1 RIGHT SIDED WEAKNESS: ICD-10-CM

## 2017-10-04 DIAGNOSIS — M20.41 HAMMER TOES OF BOTH FEET: ICD-10-CM

## 2017-10-04 DIAGNOSIS — L60.1 ONYCHOLYSIS: ICD-10-CM

## 2017-10-04 DIAGNOSIS — N18.30 CKD STAGE 3 SECONDARY TO DIABETES: ICD-10-CM

## 2017-10-04 DIAGNOSIS — B35.1 ONYCHOMYCOSIS DUE TO DERMATOPHYTE: ICD-10-CM

## 2017-10-04 DIAGNOSIS — M20.32 HALLUX MALLEUS OF LEFT FOOT: ICD-10-CM

## 2017-10-04 DIAGNOSIS — E11.49 TYPE II DIABETES MELLITUS WITH NEUROLOGICAL MANIFESTATIONS: Primary | ICD-10-CM

## 2017-10-04 PROCEDURE — 11721 DEBRIDE NAIL 6 OR MORE: CPT | Mod: 59,Q9,S$GLB, | Performed by: PODIATRIST

## 2017-10-04 PROCEDURE — 11056 PARNG/CUTG B9 HYPRKR LES 2-4: CPT | Mod: Q9,S$GLB,, | Performed by: PODIATRIST

## 2017-10-04 PROCEDURE — 99999 PR PBB SHADOW E&M-EST. PATIENT-LVL III: CPT | Mod: PBBFAC,,, | Performed by: PODIATRIST

## 2017-10-04 PROCEDURE — 99499 UNLISTED E&M SERVICE: CPT | Mod: S$GLB,,, | Performed by: PODIATRIST

## 2017-10-04 NOTE — PATIENT INSTRUCTIONS
Recommend lotions: eucerin, aquaphor, A&D ointment, gold bond for diabetics, sween    Shoe recommendations: (try 6pm.com, zappos.com , nordstromrack.com, or shoes.com for discounted prices) you can visit DSW shoes in [place] as well    Asics (GT 1000 or gel foundations), new balance, saucony (stabil c3),  Patel (transcend), vionic, propet (tennis shoe)    soft brand, clarks, crocs, aerosoles, naturalizers, SAS, ecco, jacey, etelvina kim, rockports (dress shoes)    Vionic, volitiles, burkenstocks, fitflops, propet (sandals)    Nike comfort thong sandals, crocs (house shoes)    Nail Home remedy:  Vicks Vapor rub OR Listerine and apple cider vinegar in a spray bottle to nails    Occasional soaks for 15-20 mins in luke warm water with 1 cup of listerine and 1 cup of apple cider vinegar are ok You may add several drops of oil of oregano or tea tree oil as well      Diabetes: Inspecting Your Feet  Diabetes increases your chances of developing foot problems. So inspect your feet every day. This helps you find small skin irritations before they become serious infections. If you have trouble seeing the bottoms of your feet, use a mirror or ask a family member or friend to help.     Pressure spots on the bottom of the foot are common areas where problems develop.   How to check your feet  Below are tips to help you look for foot problems. Try to check your feet at the same time each day, such as when you get out of bed in the morning:  · Check the top of each foot. The tops of toes, back of the heel, and outer edge of the foot can get a lot of rubbing from poor-fitting shoes.  · Check the bottom of each foot. Daily wear and tear often leads to problems at pressure spots.  · Check the toes and nails. Fungal infections often occur between toes. Toenail problems can also be a sign of fungal infections or lead to breaks in the skin.  · Check your shoes, too. Loose objects inside a shoe can injure the foot. Use your hand to feel  inside your shoes for things like rosaura, loose stitching, or rough areas that could irritate your skin.  Warning signs  Look for any color changes in the foot. Redness with streaks can signal a severe infection, which needs immediate medical attention. Tell your doctor right away if you have any of these problems:  · Swelling, sometimes with color changes, may be a sign of poor blood flow or infection. Symptoms include tenderness and an increase in the size of your foot.  · Warm or hot areas on your feet may be signs of infection. A foot that is cold may not be getting enough blood.  · Sensations such as burning, tingling, or pins and needles can be signs of a problem. Also check for areas that may be numb.  · Hot spots are caused by friction or pressure. Look for hot spots in areas that get a lot of rubbing. Hot spots can turn into blisters, calluses, or sores.  · Cracks and sores are caused by dry or irritated skin. They are a sign that the skin is breaking down, which can lead to infection.  · Toenail problems to watch for include nails growing into the skin (ingrown toenail) and causing redness or pain. Thick, yellow, or discolored nails can signal a fungal infection.  · Drainage and odor can develop from untreated sores and ulcers. Call your doctor right away if you notice white or yellow drainage, bleeding, or unpleasant odor.   © 8929-4251 [employer]. [address] [place] [address], [place] [address]. All rights reserved. This information is not intended as a substitute for professional medical care. Always follow your healthcare professional's instructions.

## 2017-10-04 NOTE — PROGRESS NOTES
Subjective:      Patient ID: Miguel Moore is a 62 y.o. male.    Chief Complaint: Diabetes Mellitus (pcp Dair/ 8-2-17); Diabetic Foot Exam; and Nail Care    Miguel is a 62 y.o. male who presents to the clinic for evaluation and treatment of high risk feet. Miguel has a past medical history of Diabetes mellitus, type 2; Hypertension; Nuclear sclerosis of both eyes (6/9/2017); Stroke; and Urinary tract infection. The patient's chief complaint is long, thick toenails. Routine trimming of these help keep symptoms under control. This patient has documented high risk feet requiring routine maintenance secondary to diabetes mellitis and those secondary complications of diabetes, as mentioned. no other major complaints today. Had stroke 2 years ago and has undergone extensive therapy and has improved with getting around greatly. Using cane for assistance. Also has some corns/calluses on both feet. He is wearing unsupportive sneakers and has not gotten his DM shoes yet. Requesting new prescription.     PCP: Raciel Raymond MD    Date Last Seen by PCP:   Chief Complaint   Patient presents with    Diabetes Mellitus     pcp Dair/ 8-2-17    Diabetic Foot Exam    Nail Care         Current shoe gear:   Tennis shoes         Hemoglobin A1C   Date Value Ref Range Status   08/02/2017 6.5 (H) 4.0 - 5.6 % Final     Comment:     According to ADA guidelines, hemoglobin A1c <7.0% represents  optimal control in non-pregnant diabetic patients. Different  metrics may apply to specific patient populations.   Standards of Medical Care in Diabetes-2016.  For the purpose of screening for the presence of diabetes:  <5.7%     Consistent with the absence of diabetes  5.7-6.4%  Consistent with increasing risk for diabetes   (prediabetes)  >or=6.5%  Consistent with diabetes  Currently, no consensus exists for use of hemoglobin A1c  for diagnosis of diabetes for children.  This Hemoglobin A1c assay has significant interference with fetal    hemoglobin   (HbF). The results are invalid for patients with abnormal amounts of   HbF,   including those with known Hereditary Persistence   of Fetal Hemoglobin. Heterozygous hemoglobin variants (HbAS, HbAC,   HbAD, HbAE, HbA2) do not significantly interfere with this assay;   however, presence of multiple variants in a sample may impact the %   interference.     03/21/2017 6.3 (H) 4.5 - 6.2 % Final     Comment:     According to ADA guidelines, hemoglobin A1C <7.0% represents  optimal control in non-pregnant diabetic patients.  Different  metrics may apply to specific populations.   Standards of Medical Care in Diabetes - 2016.  For the purpose of screening for the presence of diabetes:  <5.7%     Consistent with the absence of diabetes  5.7-6.4%  Consistent with increasing risk for diabetes   (prediabetes)  >or=6.5%  Consistent with diabetes  Currently no consensus exists for use of hemoglobin A1C  for diagnosis of diabetes for children.       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    Stroke     Urinary tract infection        Past Surgical History:   Procedure Laterality Date    FEMORAL ARTERY STENT      KNEE SURGERY      bilateral scope       Family History   Problem Relation Age of Onset    Diabetes Mother     Heart disease Mother     Hypertension Mother     Cataracts Mother     No Known Problems Father     Hypertension Sister     No Known Problems Brother     No Known Problems Daughter     No Known Problems Maternal Aunt     No Known Problems Maternal Uncle     No Known Problems Paternal Aunt     No Known Problems Paternal Uncle     No Known Problems Maternal Grandmother     No Known Problems Maternal Grandfather     No Known Problems Paternal Grandmother     No Known Problems Paternal Grandfather     Amblyopia Neg Hx     Blindness Neg Hx     Cancer Neg Hx     Glaucoma Neg Hx     Macular  degeneration Neg Hx     Retinal detachment Neg Hx     Strabismus Neg Hx     Stroke Neg Hx     Thyroid disease Neg Hx        Social History     Social History    Marital status: Single     Spouse name: N/A    Number of children: N/A    Years of education: N/A     Occupational History    disabled - former  - dozers, etc      Social History Main Topics    Smoking status: Never Smoker    Smokeless tobacco: Never Used    Alcohol use No    Drug use: No    Sexual activity: Not Asked     Other Topics Concern    None     Social History Narrative    None       Current Outpatient Prescriptions   Medication Sig Dispense Refill    atorvastatin (LIPITOR) 80 MG tablet Take 1 tablet (80 mg total) by mouth once daily. 30 tablet 5    baclofen (LIORESAL) 10 MG tablet Take 1 tablet (10 mg total) by mouth 4 (four) times daily. 120 tablet 5    benazepril (LOTENSIN) 40 MG tablet Take 1 tablet (40 mg total) by mouth once daily. 30 tablet 11    clopidogrel (PLAVIX) 75 mg tablet TAKE ONE TABLET BY MOUTH ONCE DAILY 30 tablet 5    difluprednate (DUREZOL) 0.05 % Drop ophthalmic solution Place 1 drop into the right eye 4 (four) times daily. For 30 days 5 mL 1    ergocalciferol (ERGOCALCIFEROL) 50,000 unit Cap Take 1 capsule (50,000 Units total) by mouth every 30 days. Maintenance dose 4 capsule 10    furosemide (LASIX) 20 MG tablet Take 1 tablet (20 mg total) by mouth once daily. 30 tablet 5    ILEVRO 0.3 % DrpS Place 1 drop into both eyes once daily. For 30 days 3 mL 1    insulin aspart (NOVOLOG) 100 unit/mL InPn pen Inject 7 Units into the skin 3 (three) times daily with meals. 5 Box 3    ofloxacin (OCUFLOX) 0.3 % ophthalmic solution Place 1 drop into the right eye 4 (four) times daily. 5 mL 1    phenytoin (DILANTIN) 100 MG ER capsule Take 1 capsule (100 mg total) by mouth 3 (three) times daily. 90 capsule 5    tamsulosin (FLOMAX) 0.4 mg Cp24 Take 2 capsules (0.8 mg total) by mouth once daily.  Increased dose. 60 capsule 5     No current facility-administered medications for this visit.        Review of patient's allergies indicates:   Allergen Reactions    Penicillins Hives       Review of Systems   Constitution: Negative for chills and fever.   Cardiovascular: Positive for leg swelling. Negative for chest pain and claudication.   Respiratory: Negative for cough and shortness of breath.    Skin: Positive for dry skin, nail changes and suspicious lesions (corns/callus).   Musculoskeletal: Positive for muscle weakness.   Gastrointestinal: Negative for nausea and vomiting.   Neurological: Positive for focal weakness (right sided), numbness and sensory change. Negative for paresthesias.   Psychiatric/Behavioral: Negative for altered mental status.           Objective:      Physical Exam   Constitutional: He is oriented to person, place, and time. He appears well-developed and well-nourished.   HENT:   Head: Normocephalic.   Cardiovascular: Intact distal pulses.    Pulses:       Dorsalis pedis pulses are 2+ on the right side, and 2+ on the left side.        Posterior tibial pulses are 1+ on the right side, and 1+ on the left side.   CRT < 3 sec to tips of toes. 2+ pitting edema noted to b/l LE. No vericosities noted to b/l LEs.      Pulmonary/Chest: No respiratory distress.   Musculoskeletal:   Gastrocnemius equinus noted to b/l ankles with decreased DF noted on exam. MMT 5/5 in DF/PF/Inv/Ev resistance with no reproduction of pain in any direction Right, 3/5 left. Passive range of motion of ankle and pedal joints is painless. Adequate pedal joint ROM.     Rigid hammertoe contractures noted to toes 2-4 R-asymptomatic.     Limited hallux MTPJ ROM with plantarflexion contracture of b/l hallux IPJ, rigid R semi-reducible L.    Neurological: He is alert and oriented to person, place, and time. He has normal strength. A sensory deficit is present.   Light touch, proprioception, and sharp/dull sensation are all  intact bilaterally. Protective threshold with the [place]-Wienstein monofilament is intact bilaterally. Vibratory sensation diminished b/l distal foot.      Skin: Skin is warm, dry and intact. No erythema.   No open lesions, lacerations or wounds noted. Nails are thickened, elongated, discolored yellow/brown with subungual debris and brittleness to R 1-5 and L 1-5. Interdigital spaces clean, dry and intact b/l. No erythema noted to b/l foot. Skin texture thin, shiny, atrophic. Pedal hair absent. Toes cool to touch b/l. Left hallux toenail distally lysed 70% with no underlying wound. No drainage or signs of infection.     Hyperkeratotic lesion noted to distal tip of left hallux just below elongated nail. Stable lesion without signs of open wounds, however pre-ulcerative.     Focal hyperkeratotic lesions noted to dorsal 2nd digit PIPJ b/l and dorsal PIPJ R 5th toe. All with skin lines present, no signs of deep tissue injury.     Mild hyperpigmentation to skin of left foot and ankle consistent with hemosiderin deposits.      Psychiatric: He has a normal mood and affect. His behavior is normal. Judgment and thought content normal.   Vitals reviewed.            Assessment:       Encounter Diagnoses   Name Primary?    Type II diabetes mellitus with neurological manifestations Yes    CKD stage 3 secondary to diabetes     Right sided weakness     Onychomycosis due to dermatophyte     Onycholysis - Left Foot     Hammer toes of both feet     Hallux malleus of left foot     Hallux malleus of right foot     Pre-ulcerative calluses          Plan:       Miguel was seen today for diabetes mellitus, diabetic foot exam and nail care.    Diagnoses and all orders for this visit:    Type II diabetes mellitus with neurological manifestations  -     DIABETIC SHOES FOR HOME USE    CKD stage 3 secondary to diabetes  -     DIABETIC SHOES FOR HOME USE    Right sided weakness  -     DIABETIC SHOES FOR HOME USE    Onychomycosis due to  dermatophyte    Onycholysis - Left Foot  -     DIABETIC SHOES FOR HOME USE    Hammer toes of both feet  -     DIABETIC SHOES FOR HOME USE    Hallux malleus of left foot  -     DIABETIC SHOES FOR HOME USE    Hallux malleus of right foot  -     DIABETIC SHOES FOR HOME USE    Pre-ulcerative calluses  -     DIABETIC SHOES FOR HOME USE      I counseled the patient on his conditions, their implications and medical management.        - Shoe inspection. Diabetic Foot Education. Patient reminded of the importance of good nutrition and blood sugar control to help prevent podiatric complications of diabetes. Patient instructed on proper foot hygeine. We discussed wearing proper shoe gear, daily foot inspections, never walking without protective shoe gear, never putting sharp instruments to feet, routine podiatric nail visits every 2-3 months.      - With patient's permission, nails were aggressively reduced and debrided x 10 to their soft tissue attachment mechanically and with electric , removing all offending nail and debris. Patient relates relief following the procedure. He will continue to monitor the areas daily, inspect his feet, wear protective shoe gear when ambulatory, moisturizer to maintain skin integrity and follow in this office in approximately 2-3 months, sooner p.r.n.    - The affected area was cleansed with an alcohol prep pad. Next, utilizing a No.15 scalpel, the hyperkeratotic tissues were trimmed from distal tip of left hallux, dorsal PIPJ b/l 2nd toe and R 5th toe, down to appropriate level of skin. Care was taken to remove any nucleated core from the center of the lesion. No pinpoint bleeding was encountered. The patient tolerated relief following this procedure.     - Discussed regular and routine moisturizer to skin of both feet to help improve dry skin. Advised to apply twice daily until resolution of symptoms. Avoid between toes.     - Discussed the use of compression stockings b/l to help  control edema. Tubigrip applied today. Discussed proper and consistent elevation of lower extremities, above the level of the heart, while at rest, to help control/improve edema.     Rx diabetic shoes with custom molded inserts to be worn at all times while ambulating. Prescription provided with list of local retailers.     RTC 3 months, sooner PRN

## 2017-10-05 ENCOUNTER — HOSPITAL ENCOUNTER (OUTPATIENT)
Facility: HOSPITAL | Age: 62
Discharge: HOME | End: 2017-10-05
Attending: OPHTHALMOLOGY | Admitting: OPHTHALMOLOGY
Payer: COMMERCIAL

## 2017-10-05 ENCOUNTER — SURGERY (OUTPATIENT)
Age: 62
End: 2017-10-05

## 2017-10-05 ENCOUNTER — ANESTHESIA (OUTPATIENT)
Dept: SURGERY | Facility: HOSPITAL | Age: 62
End: 2017-10-05
Payer: COMMERCIAL

## 2017-10-05 VITALS
DIASTOLIC BLOOD PRESSURE: 94 MMHG | SYSTOLIC BLOOD PRESSURE: 167 MMHG | HEIGHT: 68 IN | WEIGHT: 200 LBS | RESPIRATION RATE: 17 BRPM | HEART RATE: 70 BPM | TEMPERATURE: 98 F | OXYGEN SATURATION: 99 % | BODY MASS INDEX: 30.31 KG/M2

## 2017-10-05 DIAGNOSIS — H25.10 SENILE NUCLEAR SCLEROSIS: ICD-10-CM

## 2017-10-05 LAB — POCT GLUCOSE: 150 MG/DL (ref 70–110)

## 2017-10-05 PROCEDURE — 36000707: Performed by: OPHTHALMOLOGY

## 2017-10-05 PROCEDURE — D9220A PRA ANESTHESIA: Mod: CRNA,,, | Performed by: NURSE ANESTHETIST, CERTIFIED REGISTERED

## 2017-10-05 PROCEDURE — 36000706: Performed by: OPHTHALMOLOGY

## 2017-10-05 PROCEDURE — 71000015 HC POSTOP RECOV 1ST HR: Performed by: OPHTHALMOLOGY

## 2017-10-05 PROCEDURE — 82962 GLUCOSE BLOOD TEST: CPT | Performed by: OPHTHALMOLOGY

## 2017-10-05 PROCEDURE — D9220A PRA ANESTHESIA: Mod: ANES,,, | Performed by: ANESTHESIOLOGY

## 2017-10-05 PROCEDURE — V2632 POST CHMBR INTRAOCULAR LENS: HCPCS | Performed by: OPHTHALMOLOGY

## 2017-10-05 PROCEDURE — 66984 XCAPSL CTRC RMVL W/O ECP: CPT | Mod: RT,,, | Performed by: OPHTHALMOLOGY

## 2017-10-05 PROCEDURE — 63600175 PHARM REV CODE 636 W HCPCS: Performed by: OPHTHALMOLOGY

## 2017-10-05 PROCEDURE — C9447 INJ, PHENYLEPHRINE KETOROLAC: HCPCS | Performed by: OPHTHALMOLOGY

## 2017-10-05 PROCEDURE — 63600175 PHARM REV CODE 636 W HCPCS: Performed by: NURSE ANESTHETIST, CERTIFIED REGISTERED

## 2017-10-05 PROCEDURE — 25000003 PHARM REV CODE 250: Performed by: ANESTHESIOLOGY

## 2017-10-05 PROCEDURE — 37000009 HC ANESTHESIA EA ADD 15 MINS: Performed by: OPHTHALMOLOGY

## 2017-10-05 PROCEDURE — 25000003 PHARM REV CODE 250: Performed by: OPHTHALMOLOGY

## 2017-10-05 PROCEDURE — 37000008 HC ANESTHESIA 1ST 15 MINUTES: Performed by: OPHTHALMOLOGY

## 2017-10-05 DEVICE — LENS 17.5: Type: IMPLANTABLE DEVICE | Site: EYE | Status: FUNCTIONAL

## 2017-10-05 RX ORDER — HYDRALAZINE HYDROCHLORIDE 20 MG/ML
INJECTION INTRAMUSCULAR; INTRAVENOUS
Status: DISCONTINUED | OUTPATIENT
Start: 2017-10-05 | End: 2017-10-05

## 2017-10-05 RX ORDER — TROPICAMIDE 10 MG/ML
1 SOLUTION/ DROPS OPHTHALMIC
Status: DISCONTINUED | OUTPATIENT
Start: 2017-10-05 | End: 2017-10-05 | Stop reason: HOSPADM

## 2017-10-05 RX ORDER — SODIUM CHLORIDE, SODIUM LACTATE, POTASSIUM CHLORIDE, CALCIUM CHLORIDE 600; 310; 30; 20 MG/100ML; MG/100ML; MG/100ML; MG/100ML
INJECTION, SOLUTION INTRAVENOUS CONTINUOUS
Status: DISCONTINUED | OUTPATIENT
Start: 2017-10-05 | End: 2017-10-05 | Stop reason: HOSPADM

## 2017-10-05 RX ORDER — PROPARACAINE HYDROCHLORIDE 5 MG/ML
1 SOLUTION/ DROPS OPHTHALMIC
Status: DISCONTINUED | OUTPATIENT
Start: 2017-10-05 | End: 2017-10-05 | Stop reason: HOSPADM

## 2017-10-05 RX ORDER — ACETAMINOPHEN 325 MG/1
650 TABLET ORAL EVERY 4 HOURS PRN
Status: DISCONTINUED | OUTPATIENT
Start: 2017-10-05 | End: 2017-10-05 | Stop reason: HOSPADM

## 2017-10-05 RX ORDER — HYDROCODONE BITARTRATE AND ACETAMINOPHEN 5; 325 MG/1; MG/1
1 TABLET ORAL EVERY 4 HOURS PRN
Status: DISCONTINUED | OUTPATIENT
Start: 2017-10-05 | End: 2017-10-05 | Stop reason: HOSPADM

## 2017-10-05 RX ORDER — LIDOCAINE HYDROCHLORIDE 40 MG/ML
INJECTION, SOLUTION RETROBULBAR
Status: DISCONTINUED | OUTPATIENT
Start: 2017-10-05 | End: 2017-10-05 | Stop reason: HOSPADM

## 2017-10-05 RX ORDER — LIDOCAINE HYDROCHLORIDE 10 MG/ML
1 INJECTION, SOLUTION EPIDURAL; INFILTRATION; INTRACAUDAL; PERINEURAL ONCE
Status: DISCONTINUED | OUTPATIENT
Start: 2017-10-05 | End: 2017-10-05 | Stop reason: HOSPADM

## 2017-10-05 RX ORDER — CYCLOPENTOLATE HYDROCHLORIDE 10 MG/ML
1 SOLUTION/ DROPS OPHTHALMIC
Status: COMPLETED | OUTPATIENT
Start: 2017-10-05 | End: 2017-10-05

## 2017-10-05 RX ORDER — POVIDONE-IODINE 5 %
SOLUTION, NON-ORAL OPHTHALMIC (EYE)
Status: DISCONTINUED | OUTPATIENT
Start: 2017-10-05 | End: 2017-10-05 | Stop reason: HOSPADM

## 2017-10-05 RX ORDER — PREDNISOLONE ACETATE 10 MG/ML
SUSPENSION/ DROPS OPHTHALMIC
Status: DISCONTINUED | OUTPATIENT
Start: 2017-10-05 | End: 2017-10-05 | Stop reason: HOSPADM

## 2017-10-05 RX ORDER — MIDAZOLAM HYDROCHLORIDE 1 MG/ML
INJECTION, SOLUTION INTRAMUSCULAR; INTRAVENOUS
Status: DISCONTINUED | OUTPATIENT
Start: 2017-10-05 | End: 2017-10-05

## 2017-10-05 RX ORDER — PHENYLEPHRINE HYDROCHLORIDE 25 MG/ML
1 SOLUTION/ DROPS OPHTHALMIC
Status: DISCONTINUED | OUTPATIENT
Start: 2017-10-05 | End: 2017-10-05 | Stop reason: HOSPADM

## 2017-10-05 RX ORDER — OFLOXACIN 3 MG/ML
1 SOLUTION/ DROPS OPHTHALMIC
Status: COMPLETED | OUTPATIENT
Start: 2017-10-05 | End: 2017-10-05

## 2017-10-05 RX ADMIN — HYDRALAZINE HYDROCHLORIDE 5 MG: 20 INJECTION INTRAMUSCULAR; INTRAVENOUS at 09:10

## 2017-10-05 RX ADMIN — SODIUM CHONDROITIN SULFATE / SODIUM HYALURONATE 1 ML: 0.55-0.5 INJECTION INTRAOCULAR at 09:10

## 2017-10-05 RX ADMIN — OFLOXACIN 1 DROP: 3 SOLUTION OPHTHALMIC at 06:10

## 2017-10-05 RX ADMIN — MIDAZOLAM HYDROCHLORIDE 2 MG: 1 INJECTION, SOLUTION INTRAMUSCULAR; INTRAVENOUS at 09:10

## 2017-10-05 RX ADMIN — CYCLOPENTOLATE HYDROCHLORIDE 1 DROP: 10 SOLUTION/ DROPS OPHTHALMIC at 06:10

## 2017-10-05 RX ADMIN — FLUORESCEIN SODIUM 1 STRIP: 1 STRIP OPHTHALMIC at 09:10

## 2017-10-05 RX ADMIN — POVIDONE-IODINE 30 ML: 5 SOLUTION OPHTHALMIC at 09:10

## 2017-10-05 RX ADMIN — PHENYLEPHRINE HYDROCHLORIDE 1 DROP: 25 SOLUTION/ DROPS OPHTHALMIC at 06:10

## 2017-10-05 RX ADMIN — BALANCED SALT SOLUTION ENRICHED WITH BICARBONATE, DEXTROSE, AND GLUTATHIONE 500 ML: KIT at 09:10

## 2017-10-05 RX ADMIN — TROPICAMIDE 1 DROP: 10 SOLUTION/ DROPS OPHTHALMIC at 06:10

## 2017-10-05 RX ADMIN — LIDOCAINE HYDROCHLORIDE 3 ML: 40 INJECTION, SOLUTION RETROBULBAR; TOPICAL at 09:10

## 2017-10-05 RX ADMIN — PROPARACAINE HYDROCHLORIDE 1 DROP: 5 SOLUTION/ DROPS OPHTHALMIC at 06:10

## 2017-10-05 RX ADMIN — PHENYLEPHRINE AND KETOROLAC 4 ML: 10.16; 2.88 INJECTION, SOLUTION, CONCENTRATE INTRAOCULAR at 09:10

## 2017-10-05 RX ADMIN — SODIUM CHLORIDE, SODIUM LACTATE, POTASSIUM CHLORIDE, AND CALCIUM CHLORIDE: .6; .31; .03; .02 INJECTION, SOLUTION INTRAVENOUS at 07:10

## 2017-10-05 RX ADMIN — PREDNISOLONE ACETATE 2 DROP: 10 SUSPENSION/ DROPS OPHTHALMIC at 09:10

## 2017-10-05 RX ADMIN — Medication 15 ML: at 09:10

## 2017-10-05 NOTE — ANESTHESIA POSTPROCEDURE EVALUATION
"Anesthesia Post Evaluation    Patient: Miguel Moore    Procedure(s) Performed: Procedure(s) (LRB):  PHACOEMULSIFICATION-ASPIRATION-CATARACT (Right)  INSERTION-INTRAOCULAR LENS (IOL) (Right)    Final Anesthesia Type: MAC  Patient location during evaluation: PACU  Patient participation: Yes- Able to Participate  Level of consciousness: awake and alert  Post-procedure vital signs: reviewed and stable  Pain management: adequate  Airway patency: patent  PONV status at discharge: No PONV  Anesthetic complications: no      Cardiovascular status: blood pressure returned to baseline and hemodynamically stable  Respiratory status: unassisted  Hydration status: euvolemic  Follow-up not needed.        Visit Vitals  BP (!) 167/94 (BP Location: Left arm)   Pulse 70   Temp 36.6 °C (97.9 °F) (Oral)   Resp 17   Ht 5' 8" (1.727 m)   Wt 90.7 kg (200 lb)   SpO2 99%   BMI 30.41 kg/m²       Pain/Skyler Score: Pain Assessment Performed: Yes (10/5/2017  9:47 AM)  Presence of Pain: denies (10/5/2017  9:47 AM)  Skyler Score: 10 (10/5/2017  9:51 AM)      "

## 2017-10-05 NOTE — DISCHARGE INSTRUCTIONS
ACTIVITY LEVEL:  If you received sedation or an anesthetic, you may feel sleepy for several hours. Rest until you are more awake. Gradually resume your normal activities. Normal activity will cause no undue risk to your eye. The white part of your eye might be red - this is nothing to worry about. Wear your old glasses or sunglasses that were given to you for protection during the day.    RESTRICTIONS - for the next 7 days  · Do not lift anything over 10 pounds.  · When bending, bend at the knees not the waist.  · Do not rub the eye.  · Do not get water in the eye.  · Do not sleep on the side that had surgery.  · Protect your eye at bedtime with the shield provided.    DIET: At home, continue with liquids. If there is no nausea, you may eat a soft diet and gradually resume your normal diet. Limit alcohol intake for 24 hours.    BATHING: You may shower or bathe as normal. You may take a warm wash cloth, which has been wrung out to remove excess water, and gently remove crusting on the lashes.    MEDICATIONS: You may take Extra Strength Tylenol every 4 hours for pain/headache.     Use your drops     Today: Pink-  1pm     5pm    9pm     Beige - 1pm     5pm    9pm     Grey-  1pm    Tomorrow:  Resume pink and beige cap drops four times a day.  Resume grey cap drops once a day.    Place one drop in the eye that had surgery from the first bottle. Wait 5 minutes and then use the second bottle. (It does not matter which bottle is used first.) CONTINUE all glaucoma drops.   No driving, alcoholic beverages or signing legal documents for next 24 hours or while taking pain medication    Fall Prevention  Millions of people fall every year and injure themselves. You may have had anesthesia or sedation which may increase your risk of falling. You may have health issues that put you at an increased risk of falling.     Here are ways to reduce your risk of falling.  ·   · Make your home safe by keeping walkways clear of objects you  may trip over.  · Use non-slip pads under rugs. Do not use area rugs or small throw rugs.  · Use non-slip mats in bathtubs and showers.  · Install handrails and lights on staircases.  · Do not walk in poorly lit areas.  · Do not stand on chairs or wobbly ladders.  · Use caution when reaching overhead or looking upward. This position can cause a loss of balance.  · Be sure your shoes fit properly, have non-slip bottoms and are in good condition.   · Wear shoes both inside and out. Avoid going barefoot or wearing slippers.  · Be cautious when going up and down stairs, curbs, and when walking on uneven sidewalks.  · If your balance is poor, consider using a cane or walker.  · If your fall was related to alcohol use, stop or limit alcohol intake.   · If your fall was related to use of sleeping medicines, talk to your doctor about this. You may need to reduce your dosage at bedtime if you awaken during the night to go to the bathroom.    · To reduce the need for nighttime bathroom trips:  ¨ Avoid drinking fluids for several hours before going to bed  ¨ Empty your bladder before going to bed  ¨ Men can keep a urinal at the bedside  · Stay as active as you can. Balance, flexibility, strength, and endurance all come from exercise. They all play a role in preventing falls. Ask your healthcare provider which types of activity are right for you.  · Get your vision checked on a regular basis.  · If you have pets, know where they are before you stand up or walk so you don't trip over them.  · Use night lights.

## 2017-10-05 NOTE — OP NOTE
DATE OF PROCEDURE:  10/05/2017    SURGEON:  Bob Tay MD    PREOPERATIVE DIAGNOSIS:  Nucleosclerotic cataract, right eye.    POSTOPERATIVE DIAGNOSIS:  Nucleosclerotic cataract, right eye.    PROCEDURE:  Clear corneal phacoemulsification with posterior chamber intraocular   lens implant, right eye.    ANESTHESIA:  Monitored anesthesia care with 2% Xylocaine jelly topically, 1%   free lidocaine topically and intrachamberly.    PROCEDURE IN DETAIL:  After the appropriate preoperative consent was obtained,   the patient had the 2% Xylocaine jelly applied to the cornea.  The patient was   then brought to the operating room and placed into the supine position.  The   right periorbit was then prepped and draped in the usual sterile ophthalmic   fashion.  A lid speculum was then inserted into the right eye.  Several drops of   the 1% lidocaine were placed onto the right cornea.  The 1% lidocaine was also   applied to the perilimbal region with the Weck-denton sponge.  Using the 0.12-mm   forceps and the Super Sharp blade, a paracentesis site was made at the 12   o'clock position.  Approximately 0.5 mL of the 1% lidocaine was injected into   the anterior chamber.  Next, Viscoat was injected into the anterior chamber   through the paracentesis site.  The 2.75-mm keratome and the cyclodialysis   spatula were used to create a 2.75-mm clear corneal temporal groove.  The   cystitomy needle and Utrata forceps were then used to create a continuous tear   360-degree capsulorrhexis.  BSS in a cannula was then used for hydrodissection.    Phacoemulsification then proceeded in a stop-and-chop fashion without any   complications.  Another 0.5 mL of the 1% lidocaine was injected into the   anterior chamber.  The curved tip irrigation aspiration handpiece was then used   to remove the residual cortical material from the capsular bag.  Again, the 1%   lidocaine was applied to the perilimbal region with the Weck-denton sponge.  Lucy    GV was then injected into the anterior chamber and capsular bag.  An [employer] intraocular lens model SN60WF, 17.5 diopters in power, and serial #   [id number] was injected into the capsular bag with the lens injector.  The   Sinskey hook was used to position the lens into its proper place.  The residual   viscoelastic material was removed from the anterior chamber and capsular bag   with the curved tip irrigation aspiration handpiece.  Both wounds were hydrated   with BSS on a cannula.  Both wounds were checked and found to be watertight.    The lid speculum was then removed from the right eye.  The patient then had 1   drop of Vigamox ophthalmic drop and 1 drop of Econopred +1% ophthalmic drop   instilled onto the right cornea.  The eye was then shielded.  The patient   tolerated the procedure well and was then brought to the recovery room in good   condition.      ALYSON  dd: 10/05/2017 13:04:09 (CDT)  td: 10/05/2017 14:56:42 (CDT)  Doc ID   #[id number]  Job ID #[id number]    CC:

## 2017-10-05 NOTE — ANESTHESIA PREPROCEDURE EVALUATION
10/05/2017  Miguel Moore is a 62 y.o., male.    Anesthesia Evaluation    I have reviewed the Patient Summary Reports.     I have reviewed the Medications.     Review of Systems  Anesthesia Hx:  No problems with previous Anesthesia    Social:  Non-Smoker, No Alcohol Use    Cardiovascular:   Hypertension hyperlipidemia    Renal/:   Chronic Renal Disease, CRI    Neurological:   CVA    Endocrine:   Diabetes        Physical Exam  General:  Well nourished    Airway/Jaw/Neck:  Airway Findings: Mouth Opening: Normal Tongue: Normal  General Airway Assessment: Adult  Mallampati: II  TM Distance: Normal, at least 6 cm  Jaw/Neck Findings:  Neck ROM: Normal ROM      Dental:  Dental Findings: In tact   Chest/Lungs:  Chest/Lungs Findings: Clear to auscultation, Normal Respiratory Rate     Heart/Vascular:  Heart Findings: Rate: Normal        Mental Status:  Mental Status Findings:  Cooperative, Alert and Oriented         Anesthesia Plan  Type of Anesthesia, risks & benefits discussed:  Anesthesia Type:  MAC  Patient's Preference:   Intra-op Monitoring Plan: standard ASA monitors  Intra-op Monitoring Plan Comments:   Post Op Pain Control Plan: multimodal analgesia, IV/PO Opioids PRN and per primary service following discharge from PACU  Post Op Pain Control Plan Comments:   Induction:    Beta Blocker:  Patient is not currently on a Beta-Blocker (No further documentation required).       Informed Consent: Patient understands risks and agrees with Anesthesia plan.  Questions answered. Anesthesia consent signed with patient.  ASA Score: 2     Day of Surgery Review of History & Physical:    H&P update referred to the surgeon.         Ready For Surgery From Anesthesia Perspective.

## 2017-10-05 NOTE — BRIEF OP NOTE
Operative Note     SUMMARY     Surgery Date: 10/5/2017    Surgeon(s) and Role:      Bob Tay MD - Primary    Pre-op Diagnosis:  Nuclear sclerosis     Post-op/ Diagnosis:  Same    Final Diagnosis: Cataract    Procedure(s) (LRB):  PHACOEMULSIFICATION-ASPIRATION-CATARACT   INSERTION-INTRAOCULAR LENS (IOL)     Anesthesia: Monitored Anesthesia Care    Findings/Key Components:  Cataract    Outcome: Tolerated procedure well    Estimated Blood Loss: None         Specimens     None          Follow-up:  Tomorrow in clinic      Discharge Note      SUMMARY     Admit Date: 10/5/2017    Attending Physician: Bob Tay MD    Discharge Physician: Bob Tay MD    Discharge Date: 10/5/2017    Final Diagnosis: Cataract    Outcome: Tolerated procedure well    Disposition: Discharge to Home.    Medications:       Miguel Moore   Home Medication Instructions ROSE:[id number]    Printed on:10/05/17 0946   Medication Information                      atorvastatin (LIPITOR) 80 MG tablet  Take 1 tablet (80 mg total) by mouth once daily.             baclofen (LIORESAL) 10 MG tablet  Take 1 tablet (10 mg total) by mouth 4 (four) times daily.             benazepril (LOTENSIN) 40 MG tablet  Take 1 tablet (40 mg total) by mouth once daily.             clopidogrel (PLAVIX) 75 mg tablet  TAKE ONE TABLET BY MOUTH ONCE DAILY             difluprednate (DUREZOL) 0.05 % Drop ophthalmic solution  Place 1 drop into the right eye 4 (four) times daily. For 30 days             ergocalciferol (ERGOCALCIFEROL) 50,000 unit Cap  Take 1 capsule (50,000 Units total) by mouth every 30 days. Maintenance dose             furosemide (LASIX) 20 MG tablet  Take 1 tablet (20 mg total) by mouth once daily.             ILEVRO 0.3 % DrpS  Place 1 drop into both eyes once daily. For 30 days             insulin aspart (NOVOLOG) 100 unit/mL InPn pen  Inject 7 Units into the skin 3 (three) times daily with meals.             ofloxacin  (OCUFLOX) 0.3 % ophthalmic solution  Place 1 drop into the right eye 4 (four) times daily.             phenytoin (DILANTIN) 100 MG ER capsule  Take 1 capsule (100 mg total) by mouth 3 (three) times daily.             tamsulosin (FLOMAX) 0.4 mg Cp24  Take 2 capsules (0.8 mg total) by mouth once daily. Increased dose.                   Patient Discharge Instructions:     Keep Leon Shield over operated eye when not using drops.     DIET:  Regular    Activity: No heavy lifting or bending X 1 week.    Follow-up:  Tomorrow in clinic

## 2017-10-05 NOTE — TRANSFER OF CARE
"Anesthesia Transfer of Care Note    Patient: Miguel Moore    Procedure(s) Performed: Procedure(s) (LRB):  PHACOEMULSIFICATION-ASPIRATION-CATARACT (Right)  INSERTION-INTRAOCULAR LENS (IOL) (Right)    Patient location: [facility]    Anesthesia Type: MAC    Transport from OR: Transported from OR on room air with adequate spontaneous ventilation    Post pain: adequate analgesia    Post assessment: no apparent anesthetic complications and tolerated procedure well    Post vital signs: stable    Level of consciousness: awake, alert and oriented    Nausea/Vomiting: no nausea/vomiting    Complications: none    Transfer of care protocol was followed      Last vitals:   Visit Vitals  BP (!) 167/94 (BP Location: Left arm)   Pulse 70   Temp 36.6 °C (97.9 °F) (Oral)   Resp 17   Ht 5' 8" (1.727 m)   Wt 90.7 kg (200 lb)   SpO2 99%   BMI 30.41 kg/m²     "

## 2017-10-06 ENCOUNTER — OFFICE VISIT (OUTPATIENT)
Dept: OPHTHALMOLOGY | Facility: CLINIC | Age: 62
End: 2017-10-06
Payer: COMMERCIAL

## 2017-10-06 VITALS — HEART RATE: 72 BPM | SYSTOLIC BLOOD PRESSURE: 188 MMHG | DIASTOLIC BLOOD PRESSURE: 95 MMHG

## 2017-10-06 DIAGNOSIS — Z98.890 POST-OPERATIVE STATE: Primary | ICD-10-CM

## 2017-10-06 PROCEDURE — 99024 POSTOP FOLLOW-UP VISIT: CPT | Mod: S$GLB,,, | Performed by: OPHTHALMOLOGY

## 2017-10-06 PROCEDURE — 99999 PR PBB SHADOW E&M-EST. PATIENT-LVL III: CPT | Mod: PBBFAC,,, | Performed by: OPHTHALMOLOGY

## 2017-10-06 NOTE — PROGRESS NOTES
Subjective:       Patient ID: Miguel Moore is a 62 y.o. male.    Chief Complaint: Post-op Evaluation (1 day po od )    HPI  Review of Systems    Objective:      Physical Exam    Assessment:       1. Post-operative state        Plan:       S/p CE OD- Doing well.           CPM OD.  RTC 1 wk.

## 2017-10-08 DIAGNOSIS — N25.81 SECONDARY HYPERPARATHYROIDISM OF RENAL ORIGIN: ICD-10-CM

## 2017-10-08 DIAGNOSIS — E55.9 VITAMIN D DEFICIENCY: ICD-10-CM

## 2017-10-09 ENCOUNTER — TELEPHONE (OUTPATIENT)
Dept: PODIATRY | Facility: CLINIC | Age: 62
End: 2017-10-09

## 2017-10-09 RX ORDER — ERGOCALCIFEROL 1.25 MG/1
CAPSULE ORAL
Qty: 4 CAPSULE | Refills: 2 | Status: SHIPPED | OUTPATIENT
Start: 2017-10-09 | End: 2018-05-21 | Stop reason: SDUPTHER

## 2017-10-09 NOTE — TELEPHONE ENCOUNTER
----- Message from Rebeca Stubbs sent at 10/9/2017 11:06 AM CDT -----  Contact: self  Patient called requesting a order for diabetic shoes be faxed over to [employer]. Please contact him at [phone].    Thanks!

## 2017-10-10 DIAGNOSIS — I69.398 SEIZURE DISORDER AS SEQUELA OF CEREBROVASCULAR ACCIDENT: ICD-10-CM

## 2017-10-10 DIAGNOSIS — G40.909 SEIZURE DISORDER AS SEQUELA OF CEREBROVASCULAR ACCIDENT: ICD-10-CM

## 2017-10-10 RX ORDER — PHENYTOIN SODIUM 100 MG/1
CAPSULE, EXTENDED RELEASE ORAL
Qty: 270 CAPSULE | Refills: 3 | Status: SHIPPED | OUTPATIENT
Start: 2017-10-10 | End: 2018-05-11 | Stop reason: SDUPTHER

## 2017-10-13 ENCOUNTER — OFFICE VISIT (OUTPATIENT)
Dept: OPHTHALMOLOGY | Facility: CLINIC | Age: 62
End: 2017-10-13
Payer: COMMERCIAL

## 2017-10-13 DIAGNOSIS — Z98.890 POST-OPERATIVE STATE: Primary | ICD-10-CM

## 2017-10-13 DIAGNOSIS — H25.12 NUCLEAR SCLEROSIS, LEFT: ICD-10-CM

## 2017-10-13 PROCEDURE — 92136 OPHTHALMIC BIOMETRY: CPT | Mod: 26,LT,S$GLB, | Performed by: OPHTHALMOLOGY

## 2017-10-13 PROCEDURE — 99024 POSTOP FOLLOW-UP VISIT: CPT | Mod: S$GLB,,, | Performed by: OPHTHALMOLOGY

## 2017-10-13 PROCEDURE — 99999 PR PBB SHADOW E&M-EST. PATIENT-LVL II: CPT | Mod: PBBFAC,,, | Performed by: OPHTHALMOLOGY

## 2017-10-13 NOTE — PROGRESS NOTES
Subjective:       Patient ID: Miguel Moore is a 62 y.o. male.    Chief Complaint: Post-op Evaluation (1 week po od )    HPI  Review of Systems    Objective:      Physical Exam    Assessment:       1. Post-operative state    2. Nuclear sclerosis, left        Plan:       S/p CE OD- Doing well.     Visually significant cataract OS -Pt. Wants Sx.        Taper gtts OD.  CE OS 10/19/17.

## 2017-10-15 NOTE — H&P
[facility]  History & Physical    Subjective:      Chief Complaint/Reason for Admission:       Miguel Moore is a 62 y.o. male.    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    Stroke     Urinary tract infection      Past Surgical History:   Procedure Laterality Date    FEMORAL ARTERY STENT      KNEE SURGERY      bilateral scope     Family History   Problem Relation Age of Onset    Diabetes Mother     Heart disease Mother     Hypertension Mother     Cataracts Mother     No Known Problems Father     Hypertension Sister     No Known Problems Brother     No Known Problems Daughter     No Known Problems Maternal Aunt     No Known Problems Maternal Uncle     No Known Problems Paternal Aunt     No Known Problems Paternal Uncle     No Known Problems Maternal Grandmother     No Known Problems Maternal Grandfather     No Known Problems Paternal Grandmother     No Known Problems Paternal Grandfather     Amblyopia Neg Hx     Blindness Neg Hx     Cancer Neg Hx     Glaucoma Neg Hx     Macular degeneration Neg Hx     Retinal detachment Neg Hx     Strabismus Neg Hx     Stroke Neg Hx     Thyroid disease Neg Hx      Social History   Substance Use Topics    Smoking status: Never Smoker    Smokeless tobacco: Never Used    Alcohol use No       No prescriptions prior to admission.     Review of patient's allergies indicates:   Allergen Reactions    Penicillins Hives        Review of Systems   Eyes: Positive for blurred vision.   All other systems reviewed and are negative.      Objective:      Vital Signs (Most Recent)       Vital Signs Range (Last 24H):  BP: ()/()   Arterial Line BP: ()/()     Physical Exam   Constitutional: He is oriented to person, place, and time. He appears well-developed and well-nourished.   HENT:   Head: Normocephalic.   Eyes: Conjunctivae and EOM are normal. Pupils are  equal, round, and reactive to light.   Neck: Normal range of motion. Neck supple.   Cardiovascular: Normal rate.    Pulmonary/Chest: Effort normal and breath sounds normal.   Abdominal: Soft. Bowel sounds are normal.   Musculoskeletal: Normal range of motion.   Neurological: He is alert and oriented to person, place, and time.   Skin: Skin is warm.   Psychiatric: He has a normal mood and affect.       Data Review:    ECG:     Assessment:      Cataract OS.    Plan:    CE OS.

## 2017-10-16 ENCOUNTER — TELEPHONE (OUTPATIENT)
Dept: PODIATRY | Facility: CLINIC | Age: 62
End: 2017-10-16

## 2017-10-16 NOTE — PRE ADMISSION SCREENING
RN Phone Pre op done.  Instructed to remain NPO after midnight prior to surgery, except to take Lotensin and Dilantin as directed.  Expressed understanding of instructions.  Arrival time 08:45 AM.

## 2017-10-16 NOTE — TELEPHONE ENCOUNTER
----- Message from Padma Vera sent at 10/16/2017 11:53 AM CDT -----  Contact: Self  Pt calling regarding shoes. Please call [phone]

## 2017-10-18 ENCOUNTER — TELEPHONE (OUTPATIENT)
Dept: FAMILY MEDICINE | Facility: CLINIC | Age: 62
End: 2017-10-18

## 2017-10-18 NOTE — TELEPHONE ENCOUNTER
----- Message from Carli Montgomery sent at 10/17/2017 11:03 AM CDT -----  Contact: Self   Patient says he need you to call  his dentist office because they have been trying to fax you to get clearance to have his teeth pulled . [facility] on [facility]. Please call patient at [phone]

## 2017-10-19 ENCOUNTER — HOSPITAL ENCOUNTER (OUTPATIENT)
Facility: HOSPITAL | Age: 62
Discharge: HOME | End: 2017-10-19
Attending: OPHTHALMOLOGY | Admitting: OPHTHALMOLOGY
Payer: COMMERCIAL

## 2017-10-19 ENCOUNTER — SURGERY (OUTPATIENT)
Age: 62
End: 2017-10-19

## 2017-10-19 ENCOUNTER — ANESTHESIA (OUTPATIENT)
Dept: SURGERY | Facility: HOSPITAL | Age: 62
End: 2017-10-19
Payer: COMMERCIAL

## 2017-10-19 ENCOUNTER — ANESTHESIA EVENT (OUTPATIENT)
Dept: SURGERY | Facility: HOSPITAL | Age: 62
End: 2017-10-19
Payer: COMMERCIAL

## 2017-10-19 ENCOUNTER — TELEPHONE (OUTPATIENT)
Dept: FAMILY MEDICINE | Facility: CLINIC | Age: 62
End: 2017-10-19

## 2017-10-19 VITALS
HEART RATE: 70 BPM | HEIGHT: 68 IN | RESPIRATION RATE: 14 BRPM | DIASTOLIC BLOOD PRESSURE: 92 MMHG | SYSTOLIC BLOOD PRESSURE: 158 MMHG | OXYGEN SATURATION: 96 % | WEIGHT: 200 LBS | TEMPERATURE: 98 F | BODY MASS INDEX: 30.31 KG/M2

## 2017-10-19 DIAGNOSIS — H25.10 SENILE NUCLEAR SCLEROSIS: ICD-10-CM

## 2017-10-19 LAB — POCT GLUCOSE: 153 MG/DL (ref 70–110)

## 2017-10-19 PROCEDURE — D9220A PRA ANESTHESIA: Mod: CRNA,,, | Performed by: NURSE ANESTHETIST, CERTIFIED REGISTERED

## 2017-10-19 PROCEDURE — 66984 XCAPSL CTRC RMVL W/O ECP: CPT | Mod: 79,LT,, | Performed by: OPHTHALMOLOGY

## 2017-10-19 PROCEDURE — 37000009 HC ANESTHESIA EA ADD 15 MINS: Performed by: OPHTHALMOLOGY

## 2017-10-19 PROCEDURE — 36000707: Performed by: OPHTHALMOLOGY

## 2017-10-19 PROCEDURE — D9220A PRA ANESTHESIA: Mod: ANES,,, | Performed by: ANESTHESIOLOGY

## 2017-10-19 PROCEDURE — 36000706: Performed by: OPHTHALMOLOGY

## 2017-10-19 PROCEDURE — 25000003 PHARM REV CODE 250: Performed by: OPHTHALMOLOGY

## 2017-10-19 PROCEDURE — V2632 POST CHMBR INTRAOCULAR LENS: HCPCS | Performed by: OPHTHALMOLOGY

## 2017-10-19 PROCEDURE — 63600175 PHARM REV CODE 636 W HCPCS: Performed by: NURSE ANESTHETIST, CERTIFIED REGISTERED

## 2017-10-19 PROCEDURE — 63600175 PHARM REV CODE 636 W HCPCS: Performed by: OPHTHALMOLOGY

## 2017-10-19 PROCEDURE — 82962 GLUCOSE BLOOD TEST: CPT | Performed by: OPHTHALMOLOGY

## 2017-10-19 PROCEDURE — 25000003 PHARM REV CODE 250: Performed by: ANESTHESIOLOGY

## 2017-10-19 PROCEDURE — C9447 INJ, PHENYLEPHRINE KETOROLAC: HCPCS | Performed by: OPHTHALMOLOGY

## 2017-10-19 PROCEDURE — 71000015 HC POSTOP RECOV 1ST HR: Performed by: OPHTHALMOLOGY

## 2017-10-19 PROCEDURE — 71000016 HC POSTOP RECOV ADDL HR: Performed by: OPHTHALMOLOGY

## 2017-10-19 PROCEDURE — 37000008 HC ANESTHESIA 1ST 15 MINUTES: Performed by: OPHTHALMOLOGY

## 2017-10-19 DEVICE — LENS 18.5: Type: IMPLANTABLE DEVICE | Site: EYE | Status: FUNCTIONAL

## 2017-10-19 RX ORDER — MIDAZOLAM HYDROCHLORIDE 1 MG/ML
INJECTION, SOLUTION INTRAMUSCULAR; INTRAVENOUS
Status: DISCONTINUED | OUTPATIENT
Start: 2017-10-19 | End: 2017-10-19

## 2017-10-19 RX ORDER — ACETAMINOPHEN 325 MG/1
650 TABLET ORAL EVERY 4 HOURS PRN
Status: DISCONTINUED | OUTPATIENT
Start: 2017-10-19 | End: 2017-10-19 | Stop reason: HOSPADM

## 2017-10-19 RX ORDER — CYCLOPENTOLATE HYDROCHLORIDE 10 MG/ML
1 SOLUTION/ DROPS OPHTHALMIC
Status: DISCONTINUED | OUTPATIENT
Start: 2017-10-19 | End: 2017-10-19 | Stop reason: HOSPADM

## 2017-10-19 RX ORDER — PROPARACAINE HYDROCHLORIDE 5 MG/ML
1 SOLUTION/ DROPS OPHTHALMIC
Status: DISCONTINUED | OUTPATIENT
Start: 2017-10-19 | End: 2017-10-19 | Stop reason: HOSPADM

## 2017-10-19 RX ORDER — PHENYLEPHRINE HYDROCHLORIDE 25 MG/ML
1 SOLUTION/ DROPS OPHTHALMIC
Status: DISCONTINUED | OUTPATIENT
Start: 2017-10-19 | End: 2017-10-19 | Stop reason: HOSPADM

## 2017-10-19 RX ORDER — PREDNISOLONE ACETATE 10 MG/ML
SUSPENSION/ DROPS OPHTHALMIC
Status: DISCONTINUED | OUTPATIENT
Start: 2017-10-19 | End: 2017-10-19 | Stop reason: HOSPADM

## 2017-10-19 RX ORDER — OFLOXACIN 3 MG/ML
1 SOLUTION/ DROPS OPHTHALMIC
Status: COMPLETED | OUTPATIENT
Start: 2017-10-19 | End: 2017-10-19

## 2017-10-19 RX ORDER — TROPICAMIDE 10 MG/ML
1 SOLUTION/ DROPS OPHTHALMIC
Status: DISCONTINUED | OUTPATIENT
Start: 2017-10-19 | End: 2017-10-19 | Stop reason: HOSPADM

## 2017-10-19 RX ORDER — LIDOCAINE HYDROCHLORIDE 10 MG/ML
1 INJECTION, SOLUTION EPIDURAL; INFILTRATION; INTRACAUDAL; PERINEURAL ONCE
Status: DISCONTINUED | OUTPATIENT
Start: 2017-10-19 | End: 2017-10-19 | Stop reason: HOSPADM

## 2017-10-19 RX ORDER — HYDROCODONE BITARTRATE AND ACETAMINOPHEN 5; 325 MG/1; MG/1
1 TABLET ORAL EVERY 4 HOURS PRN
Status: DISCONTINUED | OUTPATIENT
Start: 2017-10-19 | End: 2017-10-19 | Stop reason: HOSPADM

## 2017-10-19 RX ORDER — LIDOCAINE HYDROCHLORIDE 40 MG/ML
INJECTION, SOLUTION RETROBULBAR
Status: DISCONTINUED | OUTPATIENT
Start: 2017-10-19 | End: 2017-10-19 | Stop reason: HOSPADM

## 2017-10-19 RX ORDER — SODIUM CHLORIDE, SODIUM LACTATE, POTASSIUM CHLORIDE, CALCIUM CHLORIDE 600; 310; 30; 20 MG/100ML; MG/100ML; MG/100ML; MG/100ML
INJECTION, SOLUTION INTRAVENOUS CONTINUOUS
Status: DISCONTINUED | OUTPATIENT
Start: 2017-10-19 | End: 2017-10-19 | Stop reason: HOSPADM

## 2017-10-19 RX ORDER — POVIDONE-IODINE 5 %
SOLUTION, NON-ORAL OPHTHALMIC (EYE)
Status: DISCONTINUED | OUTPATIENT
Start: 2017-10-19 | End: 2017-10-19 | Stop reason: HOSPADM

## 2017-10-19 RX ADMIN — PHENYLEPHRINE AND KETOROLAC 4 ML: 10.16; 2.88 INJECTION, SOLUTION, CONCENTRATE INTRAOCULAR at 10:10

## 2017-10-19 RX ADMIN — PHENYLEPHRINE HYDROCHLORIDE 1 DROP: 25 SOLUTION/ DROPS OPHTHALMIC at 09:10

## 2017-10-19 RX ADMIN — PROPARACAINE HYDROCHLORIDE 1 DROP: 5 SOLUTION/ DROPS OPHTHALMIC at 08:10

## 2017-10-19 RX ADMIN — OFLOXACIN 1 DROP: 3 SOLUTION OPHTHALMIC at 08:10

## 2017-10-19 RX ADMIN — Medication 15 ML: at 10:10

## 2017-10-19 RX ADMIN — CYCLOPENTOLATE HYDROCHLORIDE 1 DROP: 10 SOLUTION/ DROPS OPHTHALMIC at 08:10

## 2017-10-19 RX ADMIN — PHENYLEPHRINE HYDROCHLORIDE 1 DROP: 25 SOLUTION/ DROPS OPHTHALMIC at 08:10

## 2017-10-19 RX ADMIN — FLUORESCEIN SODIUM 1 STRIP: 1 STRIP OPHTHALMIC at 10:10

## 2017-10-19 RX ADMIN — SODIUM CHONDROITIN SULFATE / SODIUM HYALURONATE 1 ML: 0.55-0.5 INJECTION INTRAOCULAR at 10:10

## 2017-10-19 RX ADMIN — CYCLOPENTOLATE HYDROCHLORIDE 1 DROP: 10 SOLUTION/ DROPS OPHTHALMIC at 09:10

## 2017-10-19 RX ADMIN — PREDNISOLONE ACETATE 2 DROP: 10 SUSPENSION/ DROPS OPHTHALMIC at 10:10

## 2017-10-19 RX ADMIN — TROPICAMIDE 1 DROP: 10 SOLUTION/ DROPS OPHTHALMIC at 08:10

## 2017-10-19 RX ADMIN — MIDAZOLAM HYDROCHLORIDE 1 MG: 1 INJECTION, SOLUTION INTRAMUSCULAR; INTRAVENOUS at 10:10

## 2017-10-19 RX ADMIN — SODIUM CHLORIDE, SODIUM LACTATE, POTASSIUM CHLORIDE, AND CALCIUM CHLORIDE: 600; 310; 30; 20 INJECTION, SOLUTION INTRAVENOUS at 08:10

## 2017-10-19 RX ADMIN — LIDOCAINE HYDROCHLORIDE 3 ML: 40 INJECTION, SOLUTION RETROBULBAR; TOPICAL at 10:10

## 2017-10-19 RX ADMIN — TROPICAMIDE 1 DROP: 10 SOLUTION/ DROPS OPHTHALMIC at 09:10

## 2017-10-19 RX ADMIN — OFLOXACIN 1 DROP: 3 SOLUTION OPHTHALMIC at 09:10

## 2017-10-19 RX ADMIN — POVIDONE-IODINE 30 ML: 5 SOLUTION OPHTHALMIC at 10:10

## 2017-10-19 RX ADMIN — BALANCED SALT SOLUTION ENRICHED WITH BICARBONATE, DEXTROSE, AND GLUTATHIONE 500 ML: KIT at 10:10

## 2017-10-19 NOTE — ANESTHESIA PREPROCEDURE EVALUATION
10/19/2017  Miguel Moore is a 62 y.o., male.    Pre-op Assessment    I have reviewed the Patient Summary Reports.      I have reviewed the Medications.     Review of Systems  Anesthesia Hx:  No problems with previous Anesthesia    Social:  Non-Smoker, No Alcohol Use    Cardiovascular:   Hypertension hyperlipidemia    Renal/:   Chronic Renal Disease, CRI    Neurological:   CVA    Endocrine:   Diabetes        Physical Exam  General:  Well nourished    Airway/Jaw/Neck:  Airway Findings: Mouth Opening: Normal Tongue: Normal  General Airway Assessment: Adult  Mallampati: II  TM Distance: Normal, at least 6 cm  Jaw/Neck Findings:  Neck ROM: Normal ROM      Dental:  Dental Findings: In tact   Chest/Lungs:  Chest/Lungs Findings: Clear to auscultation, Normal Respiratory Rate     Heart/Vascular:  Heart Findings: Rate: Normal        Mental Status:  Mental Status Findings:  Cooperative, Alert and Oriented         Anesthesia Plan  Type of Anesthesia, risks & benefits discussed:  Anesthesia Type:  MAC  Patient's Preference:   Intra-op Monitoring Plan: standard ASA monitors  Intra-op Monitoring Plan Comments:   Post Op Pain Control Plan: multimodal analgesia, IV/PO Opioids PRN and per primary service following discharge from PACU  Post Op Pain Control Plan Comments:   Induction:    Beta Blocker:  Patient is not currently on a Beta-Blocker (No further documentation required).       Informed Consent: Patient understands risks and agrees with Anesthesia plan.  Questions answered. Anesthesia consent signed with patient.  ASA Score: 2     Day of Surgery Review of History & Physical:    H&P update referred to the surgeon.         Ready For Surgery From Anesthesia Perspective.

## 2017-10-19 NOTE — TELEPHONE ENCOUNTER
----- Message from Carli Montgomery sent at 10/19/2017 11:29 AM CDT -----  Contact: Self   Patient called to check on the status of his Dental form being sent back to his Dentist. Please call at [phone].

## 2017-10-19 NOTE — OP NOTE
DATE OF PROCEDURE:  10/19/2017.    SURGEON:  Bob Tay M.D.    PREOPERATIVE DIAGNOSIS:  Nuclear sclerotic cataract, left eye.    PREOPERATIVE DIAGNOSIS:  Nuclear sclerotic cataract, left eye.    PROCEDURE:  Clear corneal phacoemulsification with posterior chamber intraocular   lens implant, left eye.    ANESTHESIA:  Monitored anesthesia care with 2% Xylocaine jelly topically, 1%   free lidocaine topically and intrachamberly.    PROCEDURE IN DETAIL:  After the appropriate preoperative consent was obtained,   the patient had the 2% Xylocaine jelly applied to the cornea.  The patient was   then brought to the operating room and placed into the supine position.  The   left periorbit was then prepped and draped in the usual sterile ophthalmic   fashion.  A lid speculum was then inserted into the left eye.  Several drops of   the 1% lidocaine were placed onto the left cornea.  The 1% lidocaine was also   applied to the perilimbal region with the Weck-denton sponge.  Using the 0.12-mm   forceps and the Super Sharp blade, a paracentesis site was made at the 6 o'clock   position.  Approximately 0.5 mL of the 1% lidocaine was injected into the   anterior chamber.  Next, Viscoat was injected into the anterior chamber through   the paracentesis site.  The 2.75-mm keratome and the cyclodialysis spatula were   used to create a 2.75-mm clear corneal temporal groove.  The cystitomy needle   and Utrata forceps were then used to create a continuous tear 360-degree   capsulorrhexis.  BSS in a cannula was then used for hydrodissection.    Phacoemulsification then proceeded in a stop-and-chop fashion without any   complications.  Another 0.5 mL of the 1% lidocaine was injected into the   anterior chamber.  The curved tip irrigation aspiration handpiece was then used   to remove the residual cortical material from the capsular bag.  Again, the 1%   lidocaine was applied to the perilimbal region with the Weck-denton sponge.  Lucy    GV was then injected into the anterior chamber and capsular bag.  An [employer] intraocular lens model SN60WF, 18.5 diopters in power, and serial #   [id number] was injected into the capsular bag with the lens injector.  The   Sinskey hook was used to position the lens into its proper place.  The residual   viscoelastic material was removed from the anterior chamber and capsular bag   with the curved tip irrigation aspiration handpiece.  Both wounds were hydrated   with BSS on a cannula.  Both wounds were checked and found to be watertight.    The lid speculum was then removed from the left eye.  The patient then had 1   drop of Vigamox ophthalmic drop and 1 drop of Econopred +1% ophthalmic drop   instilled onto the left cornea.  The eye was then shielded.  The patient   tolerated the procedure well and was then brought to the recovery room in good   condition.      ALYSON  dd: 10/19/2017 12:26:16 (CDT)  td: 10/19/2017 16:06:17 (CDT)  Doc ID   #[id number]  Job ID #[id number]    CC:

## 2017-10-19 NOTE — TRANSFER OF CARE
"Anesthesia Transfer of Care Note    Patient: Miguel Moore    Procedure(s) Performed: Procedure(s) (LRB):  PHACOEMULSIFICATION-ASPIRATION-CATARACT (Left)  INSERTION-INTRAOCULAR LENS (IOL) (Left)    Patient location: [facility]    Anesthesia Type: MAC    Transport from OR: Transported from OR on room air with adequate spontaneous ventilation    Post assessment: no apparent anesthetic complications and tolerated procedure well    Post vital signs: stable    Level of consciousness: awake, alert and oriented    Nausea/Vomiting: no nausea/vomiting    Complications: none    Transfer of care protocol was followed      Last vitals:   Visit Vitals  BP (!) 158/92   Pulse 70   Temp 36.6 °C (97.9 °F) (Oral)   Resp 14   Ht 5' 8" (1.727 m)   Wt 90.7 kg (200 lb)   SpO2 96%   BMI 30.41 kg/m²     "

## 2017-10-19 NOTE — DISCHARGE INSTRUCTIONS
ACTIVITY LEVEL:  If you received sedation or an anesthetic, you may feel sleepy for several hours. Rest until you are more awake. Gradually resume your normal activities. Normal activity will cause no undue risk to your eye. The white part of your eye might be red - this is nothing to worry about. Wear your old glasses or sunglasses that were given to you for protection during the day.    RESTRICTIONS - for the next 7 days  · Do not lift anything over 10 pounds.  · When bending, bend at the knees not the waist.  · Do not rub the eye.  · Do not get water in the eye.  · Do not sleep on the side that had surgery.  · Protect your eye at bedtime with the shield provided.    DIET: At home, continue with liquids. If there is no nausea, you may eat a soft diet and gradually resume your normal diet. Limit alcohol intake for 24 hours.    BATHING: You may shower or bathe as normal. You may take a warm wash cloth, which has been wrung out to remove excess water, and gently remove crusting on the lashes.    MEDICATIONS: You may take Extra Strength Tylenol every 4 hours for pain/headache.     Use your drops     Today: Pink-  2pm      6pm     10pm     Beige - 2pm     6pm       10pm     Grey-  2pm    Tomorrow:  Resume pink and beige cap drops four times a day.  Resume grey cap drops once a day.    Place one drop in the eye that had surgery from the first bottle. Wait 5 minutes and then use the second bottle. (It does not matter which bottle is used first.) CONTINUE all glaucoma drops.   No driving, alcoholic beverages or signing legal documents for next 24 hours or while taking pain medication      WHEN TO CALL THE DOCTOR:  · Redness that increases significantly  · Pain not relieved by Tylenol  RETURN APPOINTMENT:  You will need to see Dr. Tay on Friday at the [facility] clinic .  Bring your eye kit with you on your follow-up visit. Your kit contains sunglasses, eye shield, tape and your eye drops.  FOR EMERGENCIES:  If any  unusual problems or difficulties occur, contact Dr. Tay at the Clinic office at [phone] during normal business hours. If after hours, call [phone].    Fall Prevention  Millions of people fall every year and injure themselves. You may have had anesthesia or sedation which may increase your risk of falling. You may have health issues that put you at an increased risk of falling.     Here are ways to reduce your risk of falling.  ·   · Make your home safe by keeping walkways clear of objects you may trip over.  · Use non-slip pads under rugs. Do not use area rugs or small throw rugs.  · Use non-slip mats in bathtubs and showers.  · Install handrails and lights on staircases.  · Do not walk in poorly lit areas.  · Do not stand on chairs or wobbly ladders.  · Use caution when reaching overhead or looking upward. This position can cause a loss of balance.  · Be sure your shoes fit properly, have non-slip bottoms and are in good condition.   · Wear shoes both inside and out. Avoid going barefoot or wearing slippers.  · Be cautious when going up and down stairs, curbs, and when walking on uneven sidewalks.  · If your balance is poor, consider using a cane or walker.  · If your fall was related to alcohol use, stop or limit alcohol intake.   · If your fall was related to use of sleeping medicines, talk to your doctor about this. You may need to reduce your dosage at bedtime if you awaken during the night to go to the bathroom.    · To reduce the need for nighttime bathroom trips:  ¨ Avoid drinking fluids for several hours before going to bed  ¨ Empty your bladder before going to bed  ¨ Men can keep a urinal at the bedside  · Stay as active as you can. Balance, flexibility, strength, and endurance all come from exercise. They all play a role in preventing falls. Ask your healthcare provider which types of activity are right for you.  · Get your vision checked on a regular basis.  · If you have pets, know  where they are before you stand up or walk so you don't trip over them.  · Use night lights.

## 2017-10-19 NOTE — ANESTHESIA POSTPROCEDURE EVALUATION
"Anesthesia Post Evaluation    Patient: Miguel Moore    Procedure(s) Performed: Procedure(s) (LRB):  PHACOEMULSIFICATION-ASPIRATION-CATARACT (Left)  INSERTION-INTRAOCULAR LENS (IOL) (Left)    Final Anesthesia Type: MAC  Patient location during evaluation: PACU  Patient participation: Yes- Able to Participate  Level of consciousness: awake and alert and oriented  Post-procedure vital signs: reviewed and stable  Pain management: adequate  Airway patency: patent  PONV status at discharge: No PONV  Anesthetic complications: no      Cardiovascular status: blood pressure returned to baseline and hemodynamically stable  Respiratory status: unassisted, spontaneous ventilation and room air  Hydration status: euvolemic  Follow-up not needed.        Visit Vitals  BP (!) 158/92   Pulse 70   Temp 36.6 °C (97.9 °F) (Oral)   Resp 14   Ht 5' 8" (1.727 m)   Wt 90.7 kg (200 lb)   SpO2 96%   BMI 30.41 kg/m²       Pain/Skyler Score: No Data Recorded      "

## 2017-10-19 NOTE — BRIEF OP NOTE
Operative Note     SUMMARY     Surgery Date: 10/19/2017    Surgeon(s) and Role:      Bob Tay MD - Primary    Pre-op Diagnosis:  Nuclear sclerosis     Post-op/ Diagnosis:  Same    Final Diagnosis: Cataract    Procedure(s) (LRB):  PHACOEMULSIFICATION-ASPIRATION-CATARACT   INSERTION-INTRAOCULAR LENS (IOL)     Anesthesia: Monitored Anesthesia Care    Findings/Key Components:  Cataract    Outcome: Tolerated procedure well    Estimated Blood Loss: None         Specimens     None          Follow-up:  Tomorrow in clinic      Discharge Note      SUMMARY     Admit Date: 10/19/2017    Attending Physician: Bob Tay MD    Discharge Physician: Bob Tay MD    Discharge Date: 10/19/2017    Final Diagnosis: Cataract    Outcome: Tolerated procedure well    Disposition: Discharge to Home.    Medications:       Miguel Moore   Home Medication Instructions ROSE:[id number]    Printed on:10/19/17 1049   Medication Information                      atorvastatin (LIPITOR) 80 MG tablet  Take 1 tablet (80 mg total) by mouth once daily.             baclofen (LIORESAL) 10 MG tablet  Take 1 tablet (10 mg total) by mouth 4 (four) times daily.             benazepril (LOTENSIN) 40 MG tablet  Take 1 tablet (40 mg total) by mouth once daily.             clopidogrel (PLAVIX) 75 mg tablet  TAKE ONE TABLET BY MOUTH ONCE DAILY             difluprednate (DUREZOL) 0.05 % Drop ophthalmic solution  Place 1 drop into the right eye 4 (four) times daily. For 30 days             ergocalciferol (ERGOCALCIFEROL) 50,000 unit Cap  Take 1 capsule (50,000 Units total) by mouth every 30 days. Maintenance dose             furosemide (LASIX) 20 MG tablet  Take 1 tablet (20 mg total) by mouth once daily.             ILEVRO 0.3 % DrpS  Place 1 drop into both eyes once daily. For 30 days             insulin aspart (NOVOLOG) 100 unit/mL InPn pen  Inject 7 Units into the skin 3 (three) times daily with meals.             phenytoin  (DILANTIN) 100 MG ER capsule  TAKE ONE CAPSULE BY MOUTH THREE TIMES DAILY             tamsulosin (FLOMAX) 0.4 mg Cp24  Take 2 capsules (0.8 mg total) by mouth once daily. Increased dose.             VITAMIN D2 50,000 unit capsule  TAKE 1 CAPSULE BY MOUTH EVERY 7 DAYS AFTER 2 MONTHS CHANGE TO 1 CAPSULE ONCE MONTHLY                   Patient Discharge Instructions:     Keep Leon Shield over operated eye when not using drops.     DIET:  Regular    Activity: No heavy lifting or bending X 1 week.    Follow-up:  Tomorrow in clinic

## 2017-10-20 ENCOUNTER — OFFICE VISIT (OUTPATIENT)
Dept: OPHTHALMOLOGY | Facility: CLINIC | Age: 62
End: 2017-10-20
Payer: COMMERCIAL

## 2017-10-20 VITALS — HEART RATE: 91 BPM | SYSTOLIC BLOOD PRESSURE: 139 MMHG | DIASTOLIC BLOOD PRESSURE: 82 MMHG

## 2017-10-20 DIAGNOSIS — Z98.890 POST-OPERATIVE STATE: Primary | ICD-10-CM

## 2017-10-20 PROCEDURE — 99999 PR PBB SHADOW E&M-EST. PATIENT-LVL III: CPT | Mod: PBBFAC,,, | Performed by: OPHTHALMOLOGY

## 2017-10-20 PROCEDURE — 99024 POSTOP FOLLOW-UP VISIT: CPT | Mod: S$GLB,,, | Performed by: OPHTHALMOLOGY

## 2017-10-21 NOTE — PROGRESS NOTES
Subjective:       Patient ID: Miguel Moore is a 62 y.o. male.    Chief Complaint: Post-op Evaluation (1 day PO OS. CE IOL 10/19/2017 OS. )    HPI  Review of Systems    Objective:      Physical Exam    Assessment:       1. Post-operative state        Plan:       S/p CE OU- Doing well.        CPM OS.  Taper gtts OD.  RTC 1 wk.

## 2017-10-27 ENCOUNTER — TELEPHONE (OUTPATIENT)
Dept: OPHTHALMOLOGY | Facility: CLINIC | Age: 62
End: 2017-10-27

## 2017-11-06 ENCOUNTER — OFFICE VISIT (OUTPATIENT)
Dept: PODIATRY | Facility: CLINIC | Age: 62
End: 2017-11-06
Payer: MEDICARE

## 2017-11-06 VITALS — BODY MASS INDEX: 30.31 KG/M2 | HEIGHT: 68 IN | WEIGHT: 200 LBS

## 2017-11-06 DIAGNOSIS — Z87.2 HEALED FOOT ULCER: ICD-10-CM

## 2017-11-06 DIAGNOSIS — E11.49 TYPE II DIABETES MELLITUS WITH NEUROLOGICAL MANIFESTATIONS: Primary | ICD-10-CM

## 2017-11-06 PROCEDURE — 99211 OFF/OP EST MAY X REQ PHY/QHP: CPT | Mod: S$GLB,,, | Performed by: PODIATRIST

## 2017-11-06 PROCEDURE — 99999 PR PBB SHADOW E&M-EST. PATIENT-LVL II: CPT | Mod: PBBFAC,,, | Performed by: PODIATRIST

## 2017-11-06 NOTE — PROGRESS NOTES
Subjective:      Patient ID: Miguel Moore is a 62 y.o. male.    Chief Complaint: Follow-up (left foot blister Pcp Dr. Raymond 8/2/17) and Foot Problem    Miguel is a 62 y.o. male who presents to the clinic for evaluation and treatment of high risk feet. Miguel has a past medical history of Diabetes mellitus, type 2; Hypertension; Nuclear sclerosis of both eyes (6/9/2017); Stroke; and Urinary tract infection. The patient's chief complaint is long, thick toenails. Routine trimming of these help keep symptoms under control. This patient has documented high risk feet requiring routine maintenance secondary to diabetes mellitis and those secondary complications of diabetes, as mentioned. no other major complaints today. Had stroke 2 years ago and has undergone extensive therapy and has improved with getting around greatly. Using cane for assistance. Also has some corns/calluses on both feet. He is wearing unsupportive sneakers and has not gotten his DM shoes yet. Requesting new prescription.     11/07/2017: 3 week follow up for subungual blister left great toe. No pain. No problems was applying Triple abx ointment as directed. Relates improvement. No other complaints today. Just a quick check up of blister.     PCP: Raciel Raymond MD    Date Last Seen by PCP:   Chief Complaint   Patient presents with    Follow-up     left foot blister Pcp Dr. Raymond 8/2/17    Foot Problem         Current shoe gear:   Tennis shoes         Hemoglobin A1C   Date Value Ref Range Status   08/02/2017 6.5 (H) 4.0 - 5.6 % Final     Comment:     According to ADA guidelines, hemoglobin A1c <7.0% represents  optimal control in non-pregnant diabetic patients. Different  metrics may apply to specific patient populations.   Standards of Medical Care in Diabetes-2016.  For the purpose of screening for the presence of diabetes:  <5.7%     Consistent with the absence of diabetes  5.7-6.4%  Consistent with increasing risk for diabetes    (prediabetes)  >or=6.5%  Consistent with diabetes  Currently, no consensus exists for use of hemoglobin A1c  for diagnosis of diabetes for children.  This Hemoglobin A1c assay has significant interference with fetal   hemoglobin   (HbF). The results are invalid for patients with abnormal amounts of   HbF,   including those with known Hereditary Persistence   of Fetal Hemoglobin. Heterozygous hemoglobin variants (HbAS, HbAC,   HbAD, HbAE, HbA2) do not significantly interfere with this assay;   however, presence of multiple variants in a sample may impact the %   interference.     03/21/2017 6.3 (H) 4.5 - 6.2 % Final     Comment:     According to ADA guidelines, hemoglobin A1C <7.0% represents  optimal control in non-pregnant diabetic patients.  Different  metrics may apply to specific populations.   Standards of Medical Care in Diabetes - 2016.  For the purpose of screening for the presence of diabetes:  <5.7%     Consistent with the absence of diabetes  5.7-6.4%  Consistent with increasing risk for diabetes   (prediabetes)  >or=6.5%  Consistent with diabetes  Currently no consensus exists for use of hemoglobin A1C  for diagnosis of diabetes for children.       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 Tay    CATARACT EXTRACTION W/  INTRAOCULAR LENS IMPLANT Left 10/19/2017    Dr. Tay    FEMORAL ARTERY STENT      KNEE SURGERY      bilateral scope       Family History   Problem Relation Age of Onset    Diabetes Mother     Heart disease Mother     Hypertension Mother     Cataracts Mother     No Known Problems Father     Hypertension Sister     No Known Problems Brother     No Known Problems Daughter     No Known Problems Maternal  Aunt     No Known Problems Maternal Uncle     No Known Problems Paternal Aunt     No Known Problems Paternal Uncle     No Known Problems Maternal Grandmother     No Known Problems Maternal Grandfather     No Known Problems Paternal Grandmother     No Known Problems Paternal Grandfather     Amblyopia Neg Hx     Blindness Neg Hx     Cancer Neg Hx     Glaucoma Neg Hx     Macular degeneration Neg Hx     Retinal detachment Neg Hx     Strabismus Neg Hx     Stroke Neg Hx     Thyroid disease Neg Hx        Social History     Social History    Marital status: Single     Spouse name: N/A    Number of children: N/A    Years of education: N/A     Occupational History    disabled - former  - dozers, etc      Social History Main Topics    Smoking status: Never Smoker    Smokeless tobacco: Never Used    Alcohol use No    Drug use: No    Sexual activity: Not Asked     Other Topics Concern    None     Social History Narrative    None       Current Outpatient Prescriptions   Medication Sig Dispense Refill    atorvastatin (LIPITOR) 80 MG tablet Take 1 tablet (80 mg total) by mouth once daily. 30 tablet 5    baclofen (LIORESAL) 10 MG tablet Take 1 tablet (10 mg total) by mouth 4 (four) times daily. 120 tablet 5    benazepril (LOTENSIN) 40 MG tablet Take 1 tablet (40 mg total) by mouth once daily. 30 tablet 11    clopidogrel (PLAVIX) 75 mg tablet TAKE ONE TABLET BY MOUTH ONCE DAILY 30 tablet 5    ergocalciferol (ERGOCALCIFEROL) 50,000 unit Cap Take 1 capsule (50,000 Units total) by mouth every 30 days. Maintenance dose 4 capsule 10    furosemide (LASIX) 20 MG tablet Take 1 tablet (20 mg total) by mouth once daily. 30 tablet 5    insulin aspart (NOVOLOG) 100 unit/mL InPn pen Inject 7 Units into the skin 3 (three) times daily with meals. 5 Box 3    phenytoin (DILANTIN) 100 MG ER capsule TAKE ONE CAPSULE BY MOUTH THREE TIMES DAILY 270 capsule 3    tamsulosin (FLOMAX) 0.4 mg Cp24 Take 2  capsules (0.8 mg total) by mouth once daily. Increased dose. 60 capsule 5    VITAMIN D2 50,000 unit capsule TAKE 1 CAPSULE BY MOUTH EVERY 7 DAYS AFTER 2 MONTHS CHANGE TO 1 CAPSULE ONCE MONTHLY 4 capsule 2     No current facility-administered medications for this visit.        Review of patient's allergies indicates:   Allergen Reactions    Penicillins Hives       Review of Systems   Constitution: Negative for chills and fever.   Cardiovascular: Positive for leg swelling. Negative for chest pain and claudication.   Respiratory: Negative for cough and shortness of breath.    Skin: Positive for dry skin and suspicious lesions (corns/callus).   Musculoskeletal: Positive for muscle weakness.   Gastrointestinal: Negative for nausea and vomiting.   Neurological: Positive for focal weakness (right sided), numbness and sensory change. Negative for paresthesias.   Psychiatric/Behavioral: Negative for altered mental status.           Objective:      Physical Exam   Constitutional: He is oriented to person, place, and time. He appears well-developed and well-nourished.   HENT:   Head: Normocephalic.   Cardiovascular: Intact distal pulses.    Pulses:       Dorsalis pedis pulses are 2+ on the right side, and 2+ on the left side.        Posterior tibial pulses are 1+ on the right side, and 1+ on the left side.   CRT < 3 sec to tips of toes. 2+ pitting edema noted to b/l LE. No vericosities noted to b/l LEs.      Pulmonary/Chest: No respiratory distress.   Musculoskeletal:   Gastrocnemius equinus noted to b/l ankles with decreased DF noted on exam. MMT 5/5 in DF/PF/Inv/Ev resistance with no reproduction of pain in any direction Right, 3/5 left. Passive range of motion of ankle and pedal joints is painless. Adequate pedal joint ROM.     Rigid hammertoe contractures noted to toes 2-4 R-asymptomatic.     Limited hallux MTPJ ROM with plantarflexion contracture of b/l hallux IPJ, rigid R semi-reducible L.    Neurological: He is alert  and oriented to person, place, and time. He has normal strength. A sensory deficit is present.   Light touch, proprioception, and sharp/dull sensation are all intact bilaterally. Protective threshold with the [place]-Wienstein monofilament is intact bilaterally. Vibratory sensation diminished b/l distal foot.      Skin: Skin is warm, dry and intact. No erythema.   No open lesions, lacerations or wounds noted. Nails are dystrophic but well trimmed to R 1-5 and L 1-5. Interdigital spaces clean, dry and intact b/l. No erythema noted to b/l foot. Skin texture thin, shiny, atrophic. Pedal hair absent. Toes cool to touch b/l. Left hallux toenail appears stable with no further signs of blister. Remaining toenails (subungual) appear normal with no signs of open wounds.         Psychiatric: He has a normal mood and affect. His behavior is normal. Judgment and thought content normal.   Vitals reviewed.            Assessment:       Encounter Diagnoses   Name Primary?    Type II diabetes mellitus with neurological manifestations Yes    Healed foot ulcer          Plan:       Miguel was seen today for follow-up and foot problem.    Diagnoses and all orders for this visit:    Type II diabetes mellitus with neurological manifestations    Healed foot ulcer      I counseled the patient on his conditions, their implications and medical management.        - Shoe inspection. Diabetic Foot Education. Patient reminded of the importance of good nutrition and blood sugar control to help prevent podiatric complications of diabetes. Patient instructed on proper foot hygeine. We discussed wearing proper shoe gear, daily foot inspections, never walking without protective shoe gear, never putting sharp instruments to feet, routine podiatric nail visits every 2-3 months.      - no further wound or blisters noted. All stable.     RTC 9 weeks for routine foot care and DM assessment/care.     I warned patient of any wounds forming and watch for  any signs and symptoms of infection including redness, drainage, purulence, odor, streaking, fever, chills, etc and I advised her to seek medical attention (ER or urgent car) if these symptoms arise.

## 2017-11-08 ENCOUNTER — TELEPHONE (OUTPATIENT)
Dept: OPHTHALMOLOGY | Facility: CLINIC | Age: 62
End: 2017-11-08

## 2017-11-08 DIAGNOSIS — R35.89 POLYURIA: ICD-10-CM

## 2017-11-08 RX ORDER — TAMSULOSIN HYDROCHLORIDE 0.4 MG/1
CAPSULE ORAL
Qty: 60 CAPSULE | Refills: 5 | Status: SHIPPED | OUTPATIENT
Start: 2017-11-08 | End: 2018-05-01 | Stop reason: SDUPTHER

## 2017-11-08 NOTE — TELEPHONE ENCOUNTER
----- Message from Tania Gann sent at 11/8/2017 12:06 PM CST -----  Contact: Pt  Pt returning a missed call from Anastasia about rescheduling his post-op appt which is tomorrow Thu 11/09. He can be reached at [phone]

## 2017-11-16 ENCOUNTER — OFFICE VISIT (OUTPATIENT)
Dept: OPHTHALMOLOGY | Facility: CLINIC | Age: 62
End: 2017-11-16
Payer: MEDICARE

## 2017-11-16 DIAGNOSIS — H35.353 CME (CYSTOID MACULAR EDEMA), BILATERAL: ICD-10-CM

## 2017-11-16 DIAGNOSIS — E11.9 DM TYPE 2 WITHOUT RETINOPATHY: ICD-10-CM

## 2017-11-16 DIAGNOSIS — H52.7 REFRACTIVE ERROR: ICD-10-CM

## 2017-11-16 DIAGNOSIS — Z98.890 POST-OPERATIVE STATE: Primary | ICD-10-CM

## 2017-11-16 PROCEDURE — 99999 PR PBB SHADOW E&M-EST. PATIENT-LVL III: CPT | Mod: PBBFAC,,, | Performed by: OPHTHALMOLOGY

## 2017-11-16 PROCEDURE — 99024 POSTOP FOLLOW-UP VISIT: CPT | Mod: S$GLB,,, | Performed by: OPHTHALMOLOGY

## 2017-11-16 RX ORDER — NEPAFENAC 3 MG/ML
1 SUSPENSION/ DROPS OPHTHALMIC DAILY
Qty: 3 ML | Refills: 2 | Status: SHIPPED | OUTPATIENT
Start: 2017-11-16 | End: 2018-01-15

## 2017-11-17 NOTE — PROGRESS NOTES
Subjective:       Patient ID: Miguel Moore is a 62 y.o. male.    Chief Complaint: Post-op Evaluation (3 weeks po ou )    HPI     Post-op Evaluation    Additional comments: 3 weeks po ou            Comments   DSL- 10/20/17     Pt states Va is well. Pt denies pain and discomfort. Pt sees floaters.     Eyemeds  No gtts       Last edited by Nico Rudolph on 11/16/2017  2:05 PM. (History)             Assessment:       1. Post-operative state    2. CME (cystoid macular edema), bilateral    3. DM type 2 without retinopathy    4. Refractive error        Plan:       S/p CE OU- Doing well but with residual iritis OS & CME OU.     CME OU s/p CE OU-OCT's:, .  DM-No NPDR OU.  RE        Start PF qid OU & ilevro qd OU.  RTC 4 wks.

## 2017-12-04 ENCOUNTER — OFFICE VISIT (OUTPATIENT)
Dept: NEUROLOGY | Facility: CLINIC | Age: 62
End: 2017-12-04
Payer: COMMERCIAL

## 2017-12-04 VITALS
DIASTOLIC BLOOD PRESSURE: 85 MMHG | HEART RATE: 95 BPM | BODY MASS INDEX: 30.01 KG/M2 | HEIGHT: 68 IN | WEIGHT: 198 LBS | SYSTOLIC BLOOD PRESSURE: 164 MMHG

## 2017-12-04 DIAGNOSIS — I10 BENIGN ESSENTIAL HTN: Chronic | ICD-10-CM

## 2017-12-04 DIAGNOSIS — E11.9 DM TYPE 2 WITHOUT RETINOPATHY: ICD-10-CM

## 2017-12-04 DIAGNOSIS — Z74.09 IMPAIRED FUNCTIONAL MOBILITY, BALANCE, GAIT, AND ENDURANCE: ICD-10-CM

## 2017-12-04 DIAGNOSIS — R53.1 RIGHT SIDED WEAKNESS: ICD-10-CM

## 2017-12-04 DIAGNOSIS — G40.909 SEIZURE DISORDER AS SEQUELA OF CEREBROVASCULAR ACCIDENT: Chronic | ICD-10-CM

## 2017-12-04 DIAGNOSIS — E78.5 HYPERLIPIDEMIA, UNSPECIFIED HYPERLIPIDEMIA TYPE: Chronic | ICD-10-CM

## 2017-12-04 DIAGNOSIS — I69.398 SEIZURE DISORDER AS SEQUELA OF CEREBROVASCULAR ACCIDENT: Chronic | ICD-10-CM

## 2017-12-04 DIAGNOSIS — Z86.73 HISTORY OF STROKE: Primary | ICD-10-CM

## 2017-12-04 PROCEDURE — 99214 OFFICE O/P EST MOD 30 MIN: CPT | Mod: S$GLB,,, | Performed by: NEUROLOGICAL SURGERY

## 2017-12-04 PROCEDURE — 99999 PR PBB SHADOW E&M-EST. PATIENT-LVL III: CPT | Mod: PBBFAC,,, | Performed by: NEUROLOGICAL SURGERY

## 2017-12-05 NOTE — PROGRESS NOTES
Chief Complaint   Patient presents with    Seizures        Miguel Moore is a 62 y.o. male with a history of multiple medical diagnoses as listed below that presents for evaluation of stroke. He was hospitalized for stroke in the past and while in the hospital he says that he was told that he had a seizure. Since the time he was hospitalized he has been doing well overall though he has still been dealing with a dense right hemiparesis. He has been dealing with significant muscle spasms in the right arm and leg as well. He has been compliant with all medications since he has been discharged from the hospital.    Interval History  08/30/2017  Since last seen he has been feeling that his strength and his ROM have been improved with physical therapy. He has not had any new problems since he was last seen in clinic. He has not been taking his BP at home often but says that he last checked it a few weeks and felt that it was in the 150s.    12/04/2017  He has been discharged from therapy since he was last seen in clinic.  He continues to work at home and does exercises that were recommended to him at his last physical therapy session.  He says that he continues to have difficulty with his walking, but possibly everything that he can to maintain independence.  Uses a 4 pronged cane to walk safely.  He has been compliant with all of his medications.  He says his blood pressures been normal at home.  It is elevated today, but he attributes it to the long walk from the lobby to the examination room.  He says that he has been concerned recently about his new blood pressure medication and his ability to maintain an adequate erection.  He feels that he is not able to maintain the same degree of erection as prior to when he was on the medication.     PAST MEDICAL HISTORY:  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    Stroke     Urinary  tract infection        PAST SURGICAL HISTORY:  Past Surgical History:   Procedure Laterality Date    CATARACT EXTRACTION W/  INTRAOCULAR LENS IMPLANT Right 10/05/2017    Dr. Tay    CATARACT EXTRACTION W/  INTRAOCULAR LENS IMPLANT Left 10/19/2017    Dr. Tay    FEMORAL ARTERY STENT      KNEE SURGERY      bilateral scope       SOCIAL HISTORY:  Social History     Social History    Marital status: Single     Spouse name: N/A    Number of children: N/A    Years of education: N/A     Occupational History    disabled - former  - dozers, etc      Social History Main Topics    Smoking status: Never Smoker    Smokeless tobacco: Never Used    Alcohol use No    Drug use: No    Sexual activity: Not on file     Other Topics Concern    Not on file     Social History Narrative    No narrative on file       FAMILY HISTORY:  Family History   Problem Relation Age of Onset    Diabetes Mother     Heart disease Mother     Hypertension Mother     Cataracts Mother     No Known Problems Father     Hypertension Sister     No Known Problems Brother     No Known Problems Daughter     No Known Problems Maternal Aunt     No Known Problems Maternal Uncle     No Known Problems Paternal Aunt     No Known Problems Paternal Uncle     No Known Problems Maternal Grandmother     No Known Problems Maternal Grandfather     No Known Problems Paternal Grandmother     No Known Problems Paternal Grandfather     Amblyopia Neg Hx     Blindness Neg Hx     Cancer Neg Hx     Glaucoma Neg Hx     Macular degeneration Neg Hx     Retinal detachment Neg Hx     Strabismus Neg Hx     Stroke Neg Hx     Thyroid disease Neg Hx        ALLERGIES AND MEDICATIONS: updated and reviewed.  Review of patient's allergies indicates:   Allergen Reactions    Penicillins Hives     Current Outpatient Prescriptions   Medication Sig Dispense Refill    atorvastatin (LIPITOR) 80 MG tablet Take 1 tablet (80 mg total) by  mouth once daily. 30 tablet 5    baclofen (LIORESAL) 10 MG tablet Take 1 tablet (10 mg total) by mouth 4 (four) times daily. 120 tablet 5    benazepril (LOTENSIN) 40 MG tablet Take 1 tablet (40 mg total) by mouth once daily. 30 tablet 11    clopidogrel (PLAVIX) 75 mg tablet TAKE ONE TABLET BY MOUTH ONCE DAILY 30 tablet 5    ergocalciferol (ERGOCALCIFEROL) 50,000 unit Cap Take 1 capsule (50,000 Units total) by mouth every 30 days. Maintenance dose 4 capsule 10    furosemide (LASIX) 20 MG tablet Take 1 tablet (20 mg total) by mouth once daily. 30 tablet 5    ILEVRO 0.3 % DrpS Place 1 drop into both eyes once daily. For 30 days 3 mL 2    insulin aspart (NOVOLOG) 100 unit/mL InPn pen Inject 7 Units into the skin 3 (three) times daily with meals. 5 Box 3    phenytoin (DILANTIN) 100 MG ER capsule TAKE ONE CAPSULE BY MOUTH THREE TIMES DAILY 270 capsule 3    tamsulosin (FLOMAX) 0.4 mg Cp24 TAKE TWO CAPSULES BY MOUTH ONCE DAILY 60 capsule 5    VITAMIN D2 50,000 unit capsule TAKE 1 CAPSULE BY MOUTH EVERY 7 DAYS AFTER 2 MONTHS CHANGE TO 1 CAPSULE ONCE MONTHLY 4 capsule 2     No current facility-administered medications for this visit.        Review of Systems   Constitutional: Negative for activity change, fatigue and unexpected weight change.   HENT: Negative for trouble swallowing and voice change.    Eyes: Negative for photophobia, pain and visual disturbance.   Respiratory: Negative for apnea and shortness of breath.    Cardiovascular: Negative for chest pain and palpitations.   Gastrointestinal: Negative for constipation, nausea and vomiting.   Genitourinary: Negative for difficulty urinating.   Musculoskeletal: Positive for gait problem, myalgias and neck stiffness. Negative for arthralgias, back pain and neck pain.   Skin: Negative for color change and rash.   Neurological: Positive for facial asymmetry, speech difficulty, weakness and numbness. Negative for dizziness, seizures, syncope, light-headedness and  headaches.   Psychiatric/Behavioral: Negative for agitation, behavioral problems and confusion.       Neurologic Exam     Mental Status   Oriented to person, place, and time.   Registration: recalls 3 of 3 objects.   Attention: normal. Concentration: normal.   Speech: speech is normal   Level of consciousness: alert  Knowledge: good.     Cranial Nerves     CN II   Visual fields full to confrontation.   Right visual field deficit: none  Left visual field deficit: none     CN III, IV, VI   Pupils are equal, round, and reactive to light.  Extraocular motions are normal.   Right pupil: Size: 3 mm. Shape: regular. Accommodation: intact.   Left pupil: Size: 3 mm. Shape: regular. Accommodation: intact.   CN III: no CN III palsy  CN VI: no CN VI palsy  Nystagmus: none   Diplopia: none  Ophthalmoparesis: none  Upgaze: normal  Downgaze: normal  Conjugate gaze: present    CN V   Facial sensation intact.   Right facial sensation deficit: none  Left facial sensation deficit: none    CN VII   Facial expression full, symmetric.   Right facial weakness: central  Left facial weakness: none    CN VIII   CN VIII normal.     CN IX, X   CN IX normal.   CN X normal.   Palate: symmetric    CN XI   CN XI normal.   Right sternocleidomastoid strength: weak  Left sternocleidomastoid strength: normal  Right trapezius strength: weak  Left trapezius strength: normal    CN XII   CN XII normal.   Tongue deviation: right    Motor Exam   Muscle bulk: normal  Overall muscle tone: normal  Right arm tone: normal  Left arm tone: normal  Right leg tone: normal  Left leg tone: normal    Strength   Right deltoid: 2/5  Right biceps: 2/5  Right triceps: 2/5  Right wrist flexion: 2/5  Right wrist extension: 2/5  Right interossei: 2/5  Right iliopsoas: 3/5  Right quadriceps: 4/5  Right hamstrin/5  Right anterior tibial: 3/5  Right posterior tibial: 3/5  Right peroneal: 3/5  Right gastroc: 3/5    Sensory Exam   Right arm light touch: normal  Left arm light  touch: normal  Right leg light touch: normal  Left leg light touch: normal  Right arm vibration: normal  Left arm vibration: normal  Right leg vibration: normal  Left leg vibration: normal  Right arm proprioception: normal  Left arm proprioception: normal  Right leg proprioception: normal  Left leg proprioception: normal  Right arm pinprick: normal  Left arm pinprick: normal  Right leg pinprick: normal  Left leg pinprick: normal    Gait, Coordination, and Reflexes     Gait  Gait: normal    Coordination   Romberg: negative  Finger to nose coordination: normal  Heel to shin coordination: normal  Tandem walking coordination: normal    Tremor   Resting tremor: absent    Reflexes   Right brachioradialis: 2+  Left brachioradialis: 2+  Right biceps: 2+  Left biceps: 2+  Right triceps: 2+  Left triceps: 2+  Right patellar: 2+  Left patellar: 2+  Right achilles: 2+  Left achilles: 2+  Right plantar: normal  Left plantar: normal      Physical Exam   Constitutional: He is oriented to person, place, and time. He appears well-developed and well-nourished.   HENT:   Head: Normocephalic and atraumatic.   Eyes: EOM are normal. Pupils are equal, round, and reactive to light.   Cardiovascular: Intact distal pulses.    Pulmonary/Chest: Effort normal. No respiratory distress.   Musculoskeletal: Normal range of motion.   Neurological: He is alert and oriented to person, place, and time. He has a normal Finger-Nose-Finger Test, a normal Heel to Shin Test, a normal Romberg Test and a normal Tandem Gait Test. Gait normal.   Reflex Scores:       Tricep reflexes are 2+ on the right side and 2+ on the left side.       Bicep reflexes are 2+ on the right side and 2+ on the left side.       Brachioradialis reflexes are 2+ on the right side and 2+ on the left side.       Patellar reflexes are 2+ on the right side and 2+ on the left side.       Achilles reflexes are 2+ on the right side and 2+ on the left side.  Skin: Skin is warm and dry.  "  Psychiatric: He has a normal mood and affect. His speech is normal and behavior is normal.       Vitals:    12/04/17 1052   BP: (!) 164/85   BP Location: Left arm   Patient Position: Sitting   BP Method: Large (Automatic)   Pulse: 95   Weight: 89.8 kg (197 lb 15.6 oz)   Height: 5' 8" (1.727 m)       Assessment & Plan:    Problem List Items Addressed This Visit     Benign essential HTN (Chronic)    Hyperlipidemia (Chronic)    Seizure disorder as sequela of cerebrovascular accident (Chronic)    DM type 2 without retinopathy    Impaired functional mobility, balance, gait, and endurance    Right sided weakness    History of stroke - Primary          Follow-up: Return in about 3 months (around 3/4/2018).   More than 50% of this 25 minute encounter was spent in counseling and coordinating care.        "

## 2017-12-06 DIAGNOSIS — I69.351 HEMIPLEGIA AND HEMIPARESIS FOLLOWING CEREBRAL INFARCTION AFFECTING RIGHT DOMINANT SIDE: ICD-10-CM

## 2017-12-06 DIAGNOSIS — Z86.73 HISTORY OF STROKE: ICD-10-CM

## 2017-12-06 RX ORDER — BACLOFEN 10 MG/1
TABLET ORAL
Qty: 120 TABLET | Refills: 5 | Status: SHIPPED | OUTPATIENT
Start: 2017-12-06 | End: 2018-08-18 | Stop reason: SDUPTHER

## 2017-12-15 ENCOUNTER — OFFICE VISIT (OUTPATIENT)
Dept: OPHTHALMOLOGY | Facility: CLINIC | Age: 62
End: 2017-12-15
Payer: MEDICARE

## 2017-12-15 DIAGNOSIS — H35.353 CME (CYSTOID MACULAR EDEMA), BILATERAL: ICD-10-CM

## 2017-12-15 DIAGNOSIS — Z98.890 POST-OPERATIVE STATE: Primary | ICD-10-CM

## 2017-12-15 DIAGNOSIS — H52.7 REFRACTIVE ERROR: ICD-10-CM

## 2017-12-15 PROCEDURE — 99999 PR PBB SHADOW E&M-EST. PATIENT-LVL II: CPT | Mod: PBBFAC,,, | Performed by: OPHTHALMOLOGY

## 2017-12-15 PROCEDURE — 99024 POSTOP FOLLOW-UP VISIT: CPT | Mod: S$GLB,,, | Performed by: OPHTHALMOLOGY

## 2017-12-15 RX ORDER — KETOROLAC TROMETHAMINE 5 MG/ML
1 SOLUTION OPHTHALMIC 4 TIMES DAILY
Qty: 15 ML | Refills: 2 | Status: SHIPPED | OUTPATIENT
Start: 2017-12-15 | End: 2018-12-15

## 2017-12-15 RX ORDER — PREDNISOLONE ACETATE 10 MG/ML
1 SUSPENSION/ DROPS OPHTHALMIC 4 TIMES DAILY
Qty: 15 ML | Refills: 2 | Status: SHIPPED | OUTPATIENT
Start: 2017-12-15 | End: 2018-01-14

## 2017-12-16 NOTE — PROGRESS NOTES
Subjective:       Patient ID: Miguel Moore is a 62 y.o. male.    Chief Complaint: Post-op Evaluation    HPI     Dls: 11/16/17 Dr. Tay    Po CE OU f/u CME ou   Pt states x 4 days blood shot red os throbbing os no pain blurry vision os   off/on. Pt c/o fbs os itching os off/on   Eye meds:  PF ou qid last dose this am  ilevro ou qd last dose this am           Last edited by Ursula Albarran on 12/15/2017  3:15 PM. (History)             Assessment:       1. Post-operative state    2. CME (cystoid macular edema), bilateral    3. Refractive error        Plan:       S/p CE OU- Doing well.  CME OS>OD s/p CE OU-Improving OD & stable OS per [facility].  RE-Will hold off on giving until CME resolves OU.        Cont PF qid OU.  Switch from ilevro to ketorolac qid OU to save $.  RTC 5 wks.

## 2018-01-15 ENCOUNTER — OFFICE VISIT (OUTPATIENT)
Dept: PODIATRY | Facility: CLINIC | Age: 63
End: 2018-01-15
Payer: MEDICARE

## 2018-01-15 VITALS
DIASTOLIC BLOOD PRESSURE: 70 MMHG | HEIGHT: 68 IN | WEIGHT: 197 LBS | SYSTOLIC BLOOD PRESSURE: 174 MMHG | BODY MASS INDEX: 29.86 KG/M2

## 2018-01-15 DIAGNOSIS — L84 CORN OR CALLUS: ICD-10-CM

## 2018-01-15 DIAGNOSIS — R53.1 RIGHT SIDED WEAKNESS: ICD-10-CM

## 2018-01-15 DIAGNOSIS — Z86.73 HISTORY OF STROKE: ICD-10-CM

## 2018-01-15 DIAGNOSIS — E11.49 TYPE II DIABETES MELLITUS WITH NEUROLOGICAL MANIFESTATIONS: Primary | ICD-10-CM

## 2018-01-15 DIAGNOSIS — B35.1 ONYCHOMYCOSIS DUE TO DERMATOPHYTE: ICD-10-CM

## 2018-01-15 PROCEDURE — 11055 PARING/CUTG B9 HYPRKER LES 1: CPT | Mod: S$GLB,,, | Performed by: PODIATRIST

## 2018-01-15 PROCEDURE — 99499 UNLISTED E&M SERVICE: CPT | Mod: S$GLB,,, | Performed by: PODIATRIST

## 2018-01-15 PROCEDURE — 99999 PR PBB SHADOW E&M-EST. PATIENT-LVL III: CPT | Mod: PBBFAC,,, | Performed by: PODIATRIST

## 2018-01-15 PROCEDURE — 11721 DEBRIDE NAIL 6 OR MORE: CPT | Mod: 59,S$GLB,, | Performed by: PODIATRIST

## 2018-01-15 NOTE — PROGRESS NOTES
Subjective:      Patient ID: Miguel Moore is a 62 y.o. male.    Chief Complaint: Diabetes Mellitus (pcp Dr. Raymond 8-2-17); Diabetic Foot Exam; and Nail Care    Miguel is a 62 y.o. male who presents to the clinic for evaluation and treatment of high risk feet. Miguel has a past medical history of Diabetes mellitus, type 2; Hypertension; Nuclear sclerosis of both eyes (6/9/2017); Stroke; and Urinary tract infection. The patient's chief complaint is long, thick toenails. Routine trimming of these help keep symptoms under control. This patient has documented high risk feet requiring routine maintenance secondary to diabetes mellitis and those secondary complications of diabetes, as mentioned. no other major complaints today. Had stroke 2-3 years ago and has undergone extensive therapy and has improved with getting around greatly. Using cane for assistance. Also has some corns/calluses on both feet. He is wearing new DM shoes and states these feel great. No other pedal issues today.       PCP: Raciel Raymond MD    Date Last Seen by PCP:   Chief Complaint   Patient presents with    Diabetes Mellitus     pcp Dr. Raymond 8-2-17    Diabetic Foot Exam    Nail Care         Current shoe gear:   Rx diabetic extra depth shoes and custom accommodative insoles         Hemoglobin A1C   Date Value Ref Range Status   08/02/2017 6.5 (H) 4.0 - 5.6 % Final     Comment:     According to ADA guidelines, hemoglobin A1c <7.0% represents  optimal control in non-pregnant diabetic patients. Different  metrics may apply to specific patient populations.   Standards of Medical Care in Diabetes-2016.  For the purpose of screening for the presence of diabetes:  <5.7%     Consistent with the absence of diabetes  5.7-6.4%  Consistent with increasing risk for diabetes   (prediabetes)  >or=6.5%  Consistent with diabetes  Currently, no consensus exists for use of hemoglobin A1c  for diagnosis of diabetes for children.  This Hemoglobin A1c assay has  significant interference with fetal   hemoglobin   (HbF). The results are invalid for patients with abnormal amounts of   HbF,   including those with known Hereditary Persistence   of Fetal Hemoglobin. Heterozygous hemoglobin variants (HbAS, HbAC,   HbAD, HbAE, HbA2) do not significantly interfere with this assay;   however, presence of multiple variants in a sample may impact the %   interference.     03/21/2017 6.3 (H) 4.5 - 6.2 % Final     Comment:     According to ADA guidelines, hemoglobin A1C <7.0% represents  optimal control in non-pregnant diabetic patients.  Different  metrics may apply to specific populations.   Standards of Medical Care in Diabetes - 2016.  For the purpose of screening for the presence of diabetes:  <5.7%     Consistent with the absence of diabetes  5.7-6.4%  Consistent with increasing risk for diabetes   (prediabetes)  >or=6.5%  Consistent with diabetes  Currently no consensus exists for use of hemoglobin A1C  for diagnosis of diabetes for children.       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 Tay    CATARACT EXTRACTION W/  INTRAOCULAR LENS IMPLANT Left 10/19/2017    Dr. Tay    FEMORAL ARTERY STENT      KNEE SURGERY      bilateral scope       Family History   Problem Relation Age of Onset    Diabetes Mother     Heart disease Mother     Hypertension Mother     Cataracts Mother     No Known Problems Father     Hypertension Sister     No Known Problems Brother     No Known Problems Daughter     No Known Problems Maternal Aunt     No Known Problems Maternal Uncle     No Known Problems Paternal Aunt     No Known Problems Paternal Uncle     No Known Problems Maternal Grandmother     No Known Problems  Maternal Grandfather     No Known Problems Paternal Grandmother     No Known Problems Paternal Grandfather     Amblyopia Neg Hx     Blindness Neg Hx     Cancer Neg Hx     Glaucoma Neg Hx     Macular degeneration Neg Hx     Retinal detachment Neg Hx     Strabismus Neg Hx     Stroke Neg Hx     Thyroid disease Neg Hx        Social History     Social History    Marital status: Single     Spouse name: N/A    Number of children: N/A    Years of education: N/A     Occupational History    disabled - former  - dozers, etc      Social History Main Topics    Smoking status: Never Smoker    Smokeless tobacco: Never Used    Alcohol use No    Drug use: No    Sexual activity: Not Asked     Other Topics Concern    None     Social History Narrative    None       Current Outpatient Prescriptions   Medication Sig Dispense Refill    atorvastatin (LIPITOR) 80 MG tablet Take 1 tablet (80 mg total) by mouth once daily. 30 tablet 5    baclofen (LIORESAL) 10 MG tablet TAKE ONE TABLET BY MOUTH 4 TIMES DAILY 120 tablet 5    benazepril (LOTENSIN) 40 MG tablet Take 1 tablet (40 mg total) by mouth once daily. 30 tablet 11    clopidogrel (PLAVIX) 75 mg tablet TAKE ONE TABLET BY MOUTH ONCE DAILY 30 tablet 5    ergocalciferol (ERGOCALCIFEROL) 50,000 unit Cap Take 1 capsule (50,000 Units total) by mouth every 30 days. Maintenance dose 4 capsule 10    furosemide (LASIX) 20 MG tablet Take 1 tablet (20 mg total) by mouth once daily. 30 tablet 5    ILEVRO 0.3 % DrpS Place 1 drop into both eyes once daily. For 30 days 3 mL 2    insulin aspart (NOVOLOG) 100 unit/mL InPn pen Inject 7 Units into the skin 3 (three) times daily with meals. 5 Box 3    ketorolac 0.5% (ACULAR) 0.5 % Drop Place 1 drop into both eyes 4 (four) times daily. 15 mL 2    phenytoin (DILANTIN) 100 MG ER capsule TAKE ONE CAPSULE BY MOUTH THREE TIMES DAILY 270 capsule 3    tamsulosin (FLOMAX) 0.4 mg Cp24 TAKE TWO CAPSULES BY MOUTH ONCE DAILY  60 capsule 5    VITAMIN D2 50,000 unit capsule TAKE 1 CAPSULE BY MOUTH EVERY 7 DAYS AFTER 2 MONTHS CHANGE TO 1 CAPSULE ONCE MONTHLY 4 capsule 2     No current facility-administered medications for this visit.        Review of patient's allergies indicates:   Allergen Reactions    Penicillins Hives       Review of Systems   Constitution: Negative for chills and fever.   Cardiovascular: Positive for leg swelling. Negative for chest pain and claudication.   Respiratory: Negative for cough and shortness of breath.    Skin: Positive for dry skin and suspicious lesions (corns/callus).   Musculoskeletal: Positive for muscle weakness.   Gastrointestinal: Negative for nausea and vomiting.   Neurological: Positive for focal weakness (right sided), numbness and sensory change. Negative for paresthesias.   Psychiatric/Behavioral: Negative for altered mental status.           Objective:      Physical Exam   Constitutional: He is oriented to person, place, and time. He appears well-developed and well-nourished.   HENT:   Head: Normocephalic.   Cardiovascular: Intact distal pulses.    Pulses:       Dorsalis pedis pulses are 2+ on the right side, and 2+ on the left side.        Posterior tibial pulses are 1+ on the right side, and 1+ on the left side.   CRT < 3 sec to tips of toes. 2+ pitting edema noted to b/l LE. No vericosities noted to b/l LEs.      Pulmonary/Chest: No respiratory distress.   Musculoskeletal:   Gastrocnemius equinus noted to b/l ankles with decreased DF noted on exam. MMT 5/5 in DF/PF/Inv/Ev resistance with no reproduction of pain in any direction Right, 3/5 left. Passive range of motion of ankle and pedal joints is painless. Adequate pedal joint ROM.     Rigid hammertoe contractures noted to toes 2-4 R-asymptomatic.     Limited hallux MTPJ ROM with plantarflexion contracture of b/l hallux IPJ, rigid R semi-reducible L.    Neurological: He is alert and oriented to person, place, and time. He has normal  strength. A sensory deficit is present.   Light touch, proprioception, and sharp/dull sensation are all intact bilaterally. Protective threshold with the Kensington-Wienstein monofilament is intact bilaterally. Vibratory sensation diminished b/l distal foot.      Skin: Skin is warm, dry and intact. Lesion noted. No bruising and no ecchymosis noted. No erythema.   No open lesions, lacerations or wounds noted. Nails are dystrophic, elongated, discolored with subungual debris, thickened to R 1-5 and L 1-5. Interdigital spaces clean, dry and intact b/l. No erythema noted to b/l foot. Skin texture thin, shiny, atrophic. Pedal hair absent. Toes cool to touch b/l.     Hyperkeratotic lesion noted to distal tip of left hallux. Skin lines present to lesion/s. No signs of deep tissue injury.          Psychiatric: He has a normal mood and affect. His behavior is normal. Judgment and thought content normal.   Vitals reviewed.            Assessment:       Encounter Diagnoses   Name Primary?    Type II diabetes mellitus with neurological manifestations Yes    History of stroke     Right sided weakness     Onychomycosis due to dermatophyte     Corn or callus          Plan:       Miguel was seen today for diabetes mellitus, diabetic foot exam and nail care.    Diagnoses and all orders for this visit:    Type II diabetes mellitus with neurological manifestations    History of stroke    Right sided weakness    Onychomycosis due to dermatophyte    Corn or callus      I counseled the patient on his conditions, their implications and medical management.    - Shoe inspection. Diabetic Foot Education. Patient reminded of the importance of good nutrition and blood sugar control to help prevent podiatric complications of diabetes. Patient instructed on proper foot hygeine. We discussed wearing proper shoe gear, daily foot inspections, never walking without protective shoe gear, never putting sharp instruments to feet, routine podiatric  nail visits every 2-3 months.      - With patient's permission, nails were aggressively reduced and debrided x 10 to their soft tissue attachment mechanically and with electric , removing all offending nail and debris. Patient relates relief following the procedure. He will continue to monitor the areas daily, inspect his feet, wear protective shoe gear when ambulatory, moisturizer to maintain skin integrity and follow in this office in approximately 2-3 months, sooner p.r.n.    - The affected area was cleansed with an alcohol prep pad. Next, utilizing a No.15 scalpel, the hyperkeratotic tissues were trimmed from distal tip of left hallux down to appropriate level of skin. Care was taken to remove any nucleated core from the center of the lesion. No pinpoint bleeding was encountered. The patient tolerated relief following this procedure.     - Discussed regular and routine moisturizer to skin of both feet to help improve dry skin. Advised to apply twice daily until resolution of symptoms. Avoid between toes.     - Discussed the use of compression stockings b/l to help control edema. Tubigrip applied today. Discussed proper and consistent elevation of lower extremities, above the level of the heart, while at rest, to help control/improve edema.     Rx diabetic shoes with custom molded inserts to be worn at all times while ambulating. Continue current pair.     RTC 3 months, sooner PRN

## 2018-01-15 NOTE — PATIENT INSTRUCTIONS
Recommend lotions: eucerin, aquaphor, A&D ointment, gold bond for diabetics, sween; urea 40 with aloe (found on amazon.com)    Shoe recommendations: (try 6pm.com, zappos.com , nordstromrack.com, or shoes.com for discounted prices) you can visit DSW shoes in [place] as well    Asics (GT 2000 or gel foundations), new balance, saucony (stabil c3),  Patel (GTS or Beast or transcend), vionic, propet (tennis shoe)    sofft brand, clarks, crocs, aerosoles, naturalizers, SAS, ecco, jacey, etelvina kim, rockports (dress shoes)    Vionic, burkenstocks, fitflops, propet (sandals)    Nike comfort thong sandals, crocs, propet (house shoes)    Nail Home remedy:  Vicks Vapor rub to nails for easier managability    Occasional soaks for 15-20 mins in luke warm water with 1 cup of listerine and 1 cup of apple cider vinegar are ok You may add several drops of oil of oregano or tea tree oil as well      Diabetes: Inspecting Your Feet  Diabetes increases your chances of developing foot problems. So inspect your feet every day. This helps you find small skin irritations before they become serious infections. If you have trouble seeing the bottoms of your feet, use a mirror or ask a family member or friend to help.     Pressure spots on the bottom of the foot are common areas where problems develop.   How to check your feet  Below are tips to help you look for foot problems. Try to check your feet at the same time each day, such as when you get out of bed in the morning:  · Check the top of each foot. The tops of toes, back of the heel, and outer edge of the foot can get a lot of rubbing from poor-fitting shoes.  · Check the bottom of each foot. Daily wear and tear often leads to problems at pressure spots.  · Check the toes and nails. Fungal infections often occur between toes. Toenail problems can also be a sign of fungal infections or lead to breaks in the skin.  · Check your shoes, too. Loose objects inside a shoe can injure the foot.  Use your hand to feel inside your shoes for things like rosaura, loose stitching, or rough areas that could irritate your skin.  Warning signs  Look for any color changes in the foot. Redness with streaks can signal a severe infection, which needs immediate medical attention. Tell your doctor right away if you have any of these problems:  · Swelling, sometimes with color changes, may be a sign of poor blood flow or infection. Symptoms include tenderness and an increase in the size of your foot.  · Warm or hot areas on your feet may be signs of infection. A foot that is cold may not be getting enough blood.  · Sensations such as burning, tingling, or pins and needles can be signs of a problem. Also check for areas that may be numb.  · Hot spots are caused by friction or pressure. Look for hot spots in areas that get a lot of rubbing. Hot spots can turn into blisters, calluses, or sores.  · Cracks and sores are caused by dry or irritated skin. They are a sign that the skin is breaking down, which can lead to infection.  · Toenail problems to watch for include nails growing into the skin (ingrown toenail) and causing redness or pain. Thick, yellow, or discolored nails can signal a fungal infection.  · Drainage and odor can develop from untreated sores and ulcers. Call your doctor right away if you notice white or yellow drainage, bleeding, or unpleasant odor.   © 3644-8423 [employer]. [address] [place] [address]. All rights reserved. This information is not intended as a substitute for professional medical care. Always follow your healthcare professional's instructions.        Step-by-Step:  Inspecting Your Feet (Diabetes)    Date Last Reviewed: 10/1/2016  © 3515-6295 [employer]. [address] [place] [address]. All rights reserved. This information is not intended as a substitute for professional medical care. Always follow your healthcare professional's  instructions.

## 2018-02-07 ENCOUNTER — TELEPHONE (OUTPATIENT)
Dept: FAMILY MEDICINE | Facility: CLINIC | Age: 63
End: 2018-02-07

## 2018-02-07 DIAGNOSIS — Z79.4 CONTROLLED TYPE 2 DIABETES MELLITUS WITH STAGE 3 CHRONIC KIDNEY DISEASE, WITH LONG-TERM CURRENT USE OF INSULIN: Primary | Chronic | ICD-10-CM

## 2018-02-07 DIAGNOSIS — N18.30 CONTROLLED TYPE 2 DIABETES MELLITUS WITH STAGE 3 CHRONIC KIDNEY DISEASE, WITH LONG-TERM CURRENT USE OF INSULIN: Primary | Chronic | ICD-10-CM

## 2018-02-07 DIAGNOSIS — E11.22 CONTROLLED TYPE 2 DIABETES MELLITUS WITH STAGE 3 CHRONIC KIDNEY DISEASE, WITH LONG-TERM CURRENT USE OF INSULIN: Primary | Chronic | ICD-10-CM

## 2018-02-07 NOTE — TELEPHONE ENCOUNTER
----- Message from Carli Montgomery sent at 2/7/2018 12:12 PM CST -----  Contact: Self   Patient says he need to speak with the doctor to discuss another insulin. Please call at [phone]

## 2018-02-07 NOTE — TELEPHONE ENCOUNTER
Spoke w/patient, requesting advise/recommendations.Want to know if he can use Levimeir insulin that he received from a former doctor. States the Novolog will not be covered by his insurance company and it will cost him $500.00 which he cannot afford. The expiration date on the insulin is 01/2017, patient informed insulin is too old to use. Please advise.

## 2018-02-07 NOTE — TELEPHONE ENCOUNTER
novolog and levemir are not the same.  I will ask the [facility] to assist with possible alternatives.  humalog?

## 2018-02-16 DIAGNOSIS — E11.9 TYPE 2 DIABETES MELLITUS WITHOUT COMPLICATION: ICD-10-CM

## 2018-04-01 DIAGNOSIS — Z86.73 HISTORY OF STROKE: ICD-10-CM

## 2018-04-02 RX ORDER — CLOPIDOGREL BISULFATE 75 MG/1
TABLET ORAL
Qty: 30 TABLET | Refills: 5 | Status: SHIPPED | OUTPATIENT
Start: 2018-04-02 | End: 2018-05-11 | Stop reason: SDUPTHER

## 2018-04-16 ENCOUNTER — OFFICE VISIT (OUTPATIENT)
Dept: PODIATRY | Facility: CLINIC | Age: 63
End: 2018-04-16
Payer: MEDICARE

## 2018-04-16 VITALS — HEIGHT: 68 IN | WEIGHT: 197 LBS | BODY MASS INDEX: 29.86 KG/M2

## 2018-04-16 DIAGNOSIS — Z86.73 HISTORY OF STROKE: ICD-10-CM

## 2018-04-16 DIAGNOSIS — R53.1 RIGHT SIDED WEAKNESS: ICD-10-CM

## 2018-04-16 DIAGNOSIS — L84 CORN OR CALLUS: ICD-10-CM

## 2018-04-16 DIAGNOSIS — B35.1 ONYCHOMYCOSIS DUE TO DERMATOPHYTE: ICD-10-CM

## 2018-04-16 DIAGNOSIS — E11.49 TYPE II DIABETES MELLITUS WITH NEUROLOGICAL MANIFESTATIONS: Primary | ICD-10-CM

## 2018-04-16 PROCEDURE — 99212 OFFICE O/P EST SF 10 MIN: CPT | Mod: PBBFAC,PO | Performed by: PODIATRIST

## 2018-04-16 PROCEDURE — 17999 UNLISTD PX SKN MUC MEMB SUBQ: CPT | Mod: CSM,,, | Performed by: PODIATRIST

## 2018-04-16 PROCEDURE — 99999 PR PBB SHADOW E&M-EST. PATIENT-LVL II: CPT | Mod: PBBFAC,,, | Performed by: PODIATRIST

## 2018-04-16 NOTE — PROGRESS NOTES
Subjective:      Patient ID: Miguel Moore is a 62 y.o. male.    Chief Complaint: Nail Care and Callouses    Miguel is a 62 y.o. male who presents to the clinic for evaluation and treatment of high risk feet. Miguel has a past medical history of Diabetes mellitus, type 2; Hypertension; Nuclear sclerosis of both eyes (6/9/2017); Stroke; and Urinary tract infection. The patient's chief complaint is long, thick toenails on both feet and calluses on toes of left foot. Routine trimming of these help keep symptoms under control. This patient has documented high risk feet requiring routine maintenance secondary to diabetes mellitis and those secondary complications of diabetes, as mentioned. no other major complaints today. Currently he is uninsured and is awaiting Medicare to start in 2 months. No other issues. Here for Proc B nail and callus trimming.     PCP: Raciel Raymond MD    Date Last Seen by PCP:   Chief Complaint   Patient presents with    Nail Care    Callouses         Current shoe gear:   Tennis shoes         Hemoglobin A1C   Date Value Ref Range Status   08/02/2017 6.5 (H) 4.0 - 5.6 % Final     Comment:     According to ADA guidelines, hemoglobin A1c <7.0% represents  optimal control in non-pregnant diabetic patients. Different  metrics may apply to specific patient populations.   Standards of Medical Care in Diabetes-2016.  For the purpose of screening for the presence of diabetes:  <5.7%     Consistent with the absence of diabetes  5.7-6.4%  Consistent with increasing risk for diabetes   (prediabetes)  >or=6.5%  Consistent with diabetes  Currently, no consensus exists for use of hemoglobin A1c  for diagnosis of diabetes for children.  This Hemoglobin A1c assay has significant interference with fetal   hemoglobin   (HbF). The results are invalid for patients with abnormal amounts of   HbF,   including those with known Hereditary Persistence   of Fetal Hemoglobin. Heterozygous hemoglobin variants (HbAS,  HbAC,   HbAD, HbAE, HbA2) do not significantly interfere with this assay;   however, presence of multiple variants in a sample may impact the %   interference.     03/21/2017 6.3 (H) 4.5 - 6.2 % Final     Comment:     According to ADA guidelines, hemoglobin A1C <7.0% represents  optimal control in non-pregnant diabetic patients.  Different  metrics may apply to specific populations.   Standards of Medical Care in Diabetes - 2016.  For the purpose of screening for the presence of diabetes:  <5.7%     Consistent with the absence of diabetes  5.7-6.4%  Consistent with increasing risk for diabetes   (prediabetes)  >or=6.5%  Consistent with diabetes  Currently no consensus exists for use of hemoglobin A1C  for diagnosis of diabetes for children.       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 Tay    CATARACT EXTRACTION W/  INTRAOCULAR LENS IMPLANT Left 10/19/2017    Dr. Tay    FEMORAL ARTERY STENT      KNEE SURGERY      bilateral scope       Family History   Problem Relation Age of Onset    Diabetes Mother     Heart disease Mother     Hypertension Mother     Cataracts Mother     No Known Problems Father     Hypertension Sister     No Known Problems Brother     No Known Problems Daughter     No Known Problems Maternal Aunt     No Known Problems Maternal Uncle     No Known Problems Paternal Aunt     No Known Problems Paternal Uncle     No Known Problems Maternal Grandmother     No Known Problems Maternal Grandfather     No Known Problems Paternal Grandmother     No Known Problems Paternal Grandfather     Amblyopia Neg Hx     Blindness Neg Hx     Cancer Neg Hx     Glaucoma Neg Hx     Macular degeneration Neg Hx     Retinal  detachment Neg Hx     Strabismus Neg Hx     Stroke Neg Hx     Thyroid disease Neg Hx        Social History     Social History    Marital status: Single     Spouse name: N/A    Number of children: N/A    Years of education: N/A     Occupational History    disabled - former  - dozers, etc      Social History Main Topics    Smoking status: Never Smoker    Smokeless tobacco: Never Used    Alcohol use No    Drug use: No    Sexual activity: Not Asked     Other Topics Concern    None     Social History Narrative    None       Current Outpatient Prescriptions   Medication Sig Dispense Refill    baclofen (LIORESAL) 10 MG tablet TAKE ONE TABLET BY MOUTH 4 TIMES DAILY 120 tablet 5    benazepril (LOTENSIN) 40 MG tablet Take 1 tablet (40 mg total) by mouth once daily. 30 tablet 11    clopidogrel (PLAVIX) 75 mg tablet TAKE ONE TABLET BY MOUTH ONCE DAILY 30 tablet 5    ergocalciferol (ERGOCALCIFEROL) 50,000 unit Cap Take 1 capsule (50,000 Units total) by mouth every 30 days. Maintenance dose 4 capsule 10    furosemide (LASIX) 20 MG tablet Take 1 tablet (20 mg total) by mouth once daily. 30 tablet 5    insulin aspart (NOVOLOG) 100 unit/mL InPn pen Inject 7 Units into the skin 3 (three) times daily with meals. 5 Box 3    ketorolac 0.5% (ACULAR) 0.5 % Drop Place 1 drop into both eyes 4 (four) times daily. 15 mL 2    phenytoin (DILANTIN) 100 MG ER capsule TAKE ONE CAPSULE BY MOUTH THREE TIMES DAILY 270 capsule 3    tamsulosin (FLOMAX) 0.4 mg Cp24 TAKE TWO CAPSULES BY MOUTH ONCE DAILY 60 capsule 5    VITAMIN D2 50,000 unit capsule TAKE 1 CAPSULE BY MOUTH EVERY 7 DAYS AFTER 2 MONTHS CHANGE TO 1 CAPSULE ONCE MONTHLY 4 capsule 2    atorvastatin (LIPITOR) 80 MG tablet Take 1 tablet (80 mg total) by mouth once daily. 30 tablet 5     No current facility-administered medications for this visit.        Review of patient's allergies indicates:   Allergen Reactions    Penicillins Hives       Review of  Systems   Constitution: Negative for chills and fever.   Cardiovascular: Positive for leg swelling. Negative for chest pain and claudication.   Respiratory: Negative for cough and shortness of breath.    Skin: Positive for dry skin and suspicious lesions (corns/callus).   Musculoskeletal: Positive for muscle weakness.   Gastrointestinal: Negative for nausea and vomiting.   Neurological: Positive for focal weakness (right sided), numbness and sensory change. Negative for paresthesias.   Psychiatric/Behavioral: Negative for altered mental status.           Objective:      Physical Exam   Constitutional: He is oriented to person, place, and time. He appears well-developed and well-nourished.   HENT:   Head: Normocephalic.   Cardiovascular: Intact distal pulses.    Pulses:       Dorsalis pedis pulses are 2+ on the right side, and 2+ on the left side.        Posterior tibial pulses are 1+ on the right side, and 1+ on the left side.   CRT < 3 sec to tips of toes. 2+ pitting edema noted to b/l LE. No vericosities noted to b/l LEs.      Pulmonary/Chest: No respiratory distress.   Musculoskeletal:   Gastrocnemius equinus noted to b/l ankles with decreased DF noted on exam. MMT 5/5 in DF/PF/Inv/Ev resistance with no reproduction of pain in any direction Right, 3/5 left. Passive range of motion of ankle and pedal joints is painless. Adequate pedal joint ROM.     Rigid hammertoe contractures noted to toes 2-4 R-asymptomatic.     Limited hallux MTPJ ROM with plantarflexion contracture of b/l hallux IPJ, rigid R semi-reducible L.    Neurological: He is alert and oriented to person, place, and time. He has normal strength. A sensory deficit is present.   Light touch, proprioception, and sharp/dull sensation are all intact bilaterally. Protective threshold with the Rose-Wienstein monofilament is intact bilaterally. Vibratory sensation diminished b/l distal foot.      Skin: Skin is warm, dry and intact. Lesion noted. No ecchymosis  noted. No erythema.   No open lesions, lacerations or wounds noted. Nails are dystrophic, elongated, thickened with yellow/brown discoloration to R 1 and L 1-5. Remaining toenails normotrophic.  Interdigital spaces clean, dry and intact b/l. No erythema noted to b/l foot. Skin texture thin, shiny, atrophic. Pedal hair absent. Toes cool to touch b/l. Hyperkeratotic lesion noted to left hallux distal medial aspect. Skin lines present to lesion/s. No signs of deep tissue injury.        Psychiatric: He has a normal mood and affect. His behavior is normal. Judgment and thought content normal.   Vitals reviewed.            Assessment:       Encounter Diagnoses   Name Primary?    Type II diabetes mellitus with neurological manifestations Yes    History of stroke     Right sided weakness     Onychomycosis due to dermatophyte     Corn or callus          Plan:       Miguel was seen today for nail care and callouses.    Diagnoses and all orders for this visit:    Type II diabetes mellitus with neurological manifestations    History of stroke    Right sided weakness    Onychomycosis due to dermatophyte    Corn or callus      I counseled the patient on his conditions, their implications and medical management.        - Shoe inspection. Diabetic Foot Education. Patient reminded of the importance of good nutrition and blood sugar control to help prevent podiatric complications of diabetes. Patient instructed on proper foot hygeine. We discussed wearing proper shoe gear, daily foot inspections, never walking without protective shoe gear, never putting sharp instruments to feet, routine podiatric nail visits every 2-3 months.      - With patient's permission, nails were aggressively reduced and debrided 1,2,3,4, 5 R and 1,2, 3,4,5 L and filed to their soft tissue attachment mechanically and with electric , removing all offending nail and debris. This was done as Proc B today. Patient tolerated this well and no blood was  drawn. Patient reports relief following the procedure.     - The affected area was cleansed with an alcohol prep pad. Next, utilizing a No.15 scalpel, the hyperkeratotic tissues were trimmed from distal tip of left hallux (Proc B), down to appropriate level of skin. Care was taken to remove any nucleated core from the center of the lesion. No pinpoint bleeding was encountered. The patient tolerated relief following this procedure.     RTC 3 months for routine DM foot exam and nail/callus trimming under Medicare.

## 2018-05-01 DIAGNOSIS — R35.89 POLYURIA: ICD-10-CM

## 2018-05-01 RX ORDER — TAMSULOSIN HYDROCHLORIDE 0.4 MG/1
CAPSULE ORAL
Qty: 60 CAPSULE | Refills: 0 | Status: SHIPPED | OUTPATIENT
Start: 2018-05-01 | End: 2018-05-11 | Stop reason: SDUPTHER

## 2018-05-01 NOTE — TELEPHONE ENCOUNTER
Spoke w/patient, states he is having problems with his insurance right now. States he will call office back to schedule appt once he talks with insurance company.

## 2018-05-10 PROBLEM — Z79.4 TYPE 2 DIABETES MELLITUS WITH DIABETIC NEUROPATHY, WITH LONG-TERM CURRENT USE OF INSULIN: Status: ACTIVE | Noted: 2018-05-10

## 2018-05-10 PROBLEM — E11.40 TYPE 2 DIABETES MELLITUS WITH DIABETIC NEUROPATHY, WITHOUT LONG-TERM CURRENT USE OF INSULIN: Status: ACTIVE | Noted: 2018-05-10

## 2018-05-10 NOTE — PROGRESS NOTES
"This note was created by combination of typed  and Dragon dictation.  Transcription errors may be present.  If there are any questions, please contact me.    Assessment & Plan:   Controlled type 2 diabetes mellitus with stage 3 chronic kidney disease, without long-term current use of insulin  Type 2 diabetes mellitus with diabetic neuropathy, with long-term current use of insulin   - refilled meds novolog 7 with meals, metformin, glucose meter and supplies, pen needles, check labs.  -     insulin aspart U-100 (NOVOLOG) 100 unit/mL InPn pen; Inject 7 Units into the skin 3 (three) times daily with meals.  Dispense: 5 Box; Refill: 11  -     blood-glucose meter Misc; 1 each by Misc.(Non-Drug; Combo Route) route once daily. Pharmacy-whichever brand specified by insurance  Dispense: 1 each; Refill: 0  -     lancets (ONETOUCH DELICA LANCETS) 33 gauge Misc; 1 lancet by Misc.(Non-Drug; Combo Route) route once daily. ONCE DAILY BLOOD SUGAR TESTING; WHICHEVER BRAND COVERED BY INSURANCE  Dispense: 30 each; Refill: 11  -     blood sugar diagnostic Strp; ONCE DAILY BLOOD SUGAR TESTING; WHICHEVER BRAND COVERED BY INSURANCE  Dispense: 30 strip; Refill: 11  -     pen needle, diabetic 31 gauge x 1/4" Ndle; For mealtime insulin  Dispense: 300 each; Refill: 11  -     Hemoglobin A1c; Future; Expected date: 05/11/2018    Secondary hyperparathyroidism of renal origin  Vitamin D deficiency  -asked him and his partner to verify if he is taking vit D 50k and if so, how often; instructed to take weekly not daily. Check PTH and vit D.  -     PTH, intact; Future; Expected date: 05/11/2018  -     Vitamin D; Future; Expected date: 05/11/2018    Essential hypertension  History of stroke  Hemiplegia and hemiparesis following cerebral infarction affecting right dominant side  History of stroke with residual effects  Hyperlipidemia, unspecified hyperlipidemia type   - stable, benazepril no change; check labs on lipitor 80; on plavix for " secondary prevention.  -     benazepril (LOTENSIN) 40 MG tablet; Take 1 tablet (40 mg total) by mouth once daily.  Dispense: 90 tablet; Refill: 3  -     clopidogrel (PLAVIX) 75 mg tablet; Take 1 tablet (75 mg total) by mouth once daily.  Dispense: 90 tablet; Refill: 3  -     atorvastatin (LIPITOR) 80 MG tablet; Take 1 tablet (80 mg total) by mouth once daily.  Dispense: 90 tablet; Refill: 3  -     Comprehensive metabolic panel; Future; Expected date: 05/11/2018  -     Lipid panel; Future; Expected date: 05/11/2018    Peripheral edema - refilled lasix, check lytes.  -     furosemide (LASIX) 20 MG tablet; Take 1 tablet (20 mg total) by mouth once daily.  Dispense: 90 tablet; Refill: 3    Polyuria - refilled tamsulosin.   -     tamsulosin (FLOMAX) 0.4 mg Cp24; Take 2 capsules (0.8 mg total) by mouth once daily.  Dispense: 60 capsule; Refill: 3    Seizure disorder as sequela of cerebrovascular accident - check dilantin level.  Takes extended release TID.  -     phenytoin (DILANTIN) 100 MG ER capsule; Take 1 capsule (100 mg total) by mouth 3 (three) times daily.  Dispense: 270 capsule; Refill: 3    Screening for colon cancer  -     Fecal Immunochemical Test (iFOBT); Future; Expected date: 05/11/2018    FitKit was given to patient on 5/11/2018 12:06 PM     Medications Discontinued During This Encounter   Medication Reason    insulin aspart (NOVOLOG) 100 unit/mL InPn pen Reorder    benazepril (LOTENSIN) 40 MG tablet Reorder    clopidogrel (PLAVIX) 75 mg tablet Reorder    atorvastatin (LIPITOR) 80 MG tablet Reorder    furosemide (LASIX) 20 MG tablet Reorder    tamsulosin (FLOMAX) 0.4 mg Cp24 Reorder    phenytoin (DILANTIN) 100 MG ER capsule Reorder    ergocalciferol (ERGOCALCIFEROL) 50,000 unit Cap Therapy completed     Modified Medications    Modified Medication Previous Medication    ATORVASTATIN (LIPITOR) 80 MG TABLET atorvastatin (LIPITOR) 80 MG tablet       Take 1 tablet (80 mg total) by mouth once daily.     "Take 1 tablet (80 mg total) by mouth once daily.    BENAZEPRIL (LOTENSIN) 40 MG TABLET benazepril (LOTENSIN) 40 MG tablet       Take 1 tablet (40 mg total) by mouth once daily.    Take 1 tablet (40 mg total) by mouth once daily.    CLOPIDOGREL (PLAVIX) 75 MG TABLET clopidogrel (PLAVIX) 75 mg tablet       Take 1 tablet (75 mg total) by mouth once daily.    TAKE ONE TABLET BY MOUTH ONCE DAILY    FUROSEMIDE (LASIX) 20 MG TABLET furosemide (LASIX) 20 MG tablet       Take 1 tablet (20 mg total) by mouth once daily.    Take 1 tablet (20 mg total) by mouth once daily.    INSULIN ASPART U-100 (NOVOLOG) 100 UNIT/ML INPN PEN insulin aspart (NOVOLOG) 100 unit/mL InPn pen       Inject 7 Units into the skin 3 (three) times daily with meals.    Inject 7 Units into the skin 3 (three) times daily with meals.    PHENYTOIN (DILANTIN) 100 MG ER CAPSULE phenytoin (DILANTIN) 100 MG ER capsule       Take 1 capsule (100 mg total) by mouth 3 (three) times daily.    TAKE ONE CAPSULE BY MOUTH THREE TIMES DAILY    TAMSULOSIN (FLOMAX) 0.4 MG CP24 tamsulosin (FLOMAX) 0.4 mg Cp24       Take 2 capsules (0.8 mg total) by mouth once daily.    TAKE TWO CAPSULES BY MOUTH ONCE DAILY     New Prescriptions    BLOOD SUGAR DIAGNOSTIC STRP    ONCE DAILY BLOOD SUGAR TESTING; WHICHEVER BRAND COVERED BY INSURANCE    BLOOD-GLUCOSE METER MISC    1 each by Misc.(Non-Drug; Combo Route) route once daily. Pharmacy-whichever brand specified by insurance    LANCETS (ONETOUCH DELICA LANCETS) 33 GAUGE MISC    1 lancet by Misc.(Non-Drug; Combo Route) route once daily. ONCE DAILY BLOOD SUGAR TESTING; WHICHEVER BRAND COVERED BY INSURANCE    PEN NEEDLE, DIABETIC 31 GAUGE X 1/4" NDLE    For mealtime insulin       Follow Up: No Follow-up on file. OV 6 months if normal.        Subjective:     Chief Complaint   Patient presents with    Diabetes    Hypertension       HPI  Miguel is a 62 y.o. male, last appointment with this clinic was Visit date not found.    DM with " neuropathy - diminished vibriception; on novolog only, had stopped lantus previously; a1c 6.3.  Currently 7 units with meals.  Cataracts seen by optometry since OV.  Hx of CVA with R sided hemiplegia  Seizure disorder after CVA, dilantin  Hyperlipidemia started on atorvastatin last OV  HTN benazepril, increased dose previously.  CKD stage 3  Peripheral edema, lasix, taking every other day perhaps.   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  Impacted cerumen, ENT    Labs August 2017, vitamin-D very low.  A1c 6.5.  Lipid profile okay.    No outside BP readings.  novolog 7 units with meals.   It's unclear if he is taking the vit D 50k and if so if he is taking daily or weekly. I have asked him to verify this.  Needs fitkit.    Patient Care Team:  Raciel Raymond MD as PCP - General (Internal Medicine)  Gabriella Manjarrez DPM as Consulting Physician (Podiatry)  Mac Chambers Jr., MD as Consulting Physician (Urology)  Geovany Rucker MD as Consulting Physician (Neurology)  Bob Tay MD as Consulting Physician (Ophthalmology)    Patient Active Problem List    Diagnosis Date Noted    Type 2 diabetes mellitus with diabetic neuropathy, without long-term current use of insulin 05/10/2018    History of stroke 12/04/2017    CME (cystoid macular edema), bilateral 11/16/2017    Refractive error 11/16/2017    Post-operative state 10/06/2017    Senile nuclear sclerosis 10/05/2017    Right sided weakness 09/11/2017    Secondary hyperparathyroidism of renal origin 08/03/2017    Vitamin D deficiency 08/03/2017    Impaired functional mobility, balance, gait, and endurance 06/23/2017    Nuclear sclerosis, left 06/09/2017    Cortical cataract of both eyes 06/09/2017    DM type 2 without retinopathy 06/09/2017    Microcytosis 03/22/2017     Seen on admission as well 2015; normal Hb, low MCV.  Normal RDW      CKD stage 3 secondary to diabetes 03/22/2017     Benign essential HTN 03/21/2017    Hyperlipidemia 03/21/2017    Controlled type 2 diabetes mellitus with stage 3 chronic kidney disease, with long-term current use of insulin 03/21/2017    Benign non-nodular prostatic hyperplasia without lower urinary tract symptoms 03/21/2017    Seizure disorder as sequela of cerebrovascular accident 03/21/2017       PAST MEDICAL HISTORY:  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    Stroke     Urinary tract infection        PAST SURGICAL HISTORY:  Past Surgical History:   Procedure Laterality Date    CATARACT EXTRACTION W/  INTRAOCULAR LENS IMPLANT Right 10/05/2017    Dr. Tay    CATARACT EXTRACTION W/  INTRAOCULAR LENS IMPLANT Left 10/19/2017    Dr. Tay    FEMORAL ARTERY STENT      KNEE SURGERY      bilateral scope       SOCIAL HISTORY:  Social History     Social History    Marital status: Single     Spouse name: N/A    Number of children: N/A    Years of education: N/A     Occupational History    disabled - former  - dozers, etc      Social History Main Topics    Smoking status: Never Smoker    Smokeless tobacco: Never Used    Alcohol use No    Drug use: No    Sexual activity: Not on file     Other Topics Concern    Not on file     Social History Narrative    No narrative on file       ALLERGIES AND MEDICATIONS: updated and reviewed.  Review of patient's allergies indicates:   Allergen Reactions    Penicillins Hives     Current Outpatient Prescriptions   Medication Sig Dispense Refill    atorvastatin (LIPITOR) 80 MG tablet Take 1 tablet (80 mg total) by mouth once daily. 30 tablet 5    baclofen (LIORESAL) 10 MG tablet TAKE ONE TABLET BY MOUTH 4 TIMES DAILY 120 tablet 5    benazepril (LOTENSIN) 40 MG tablet Take 1 tablet (40 mg total) by mouth once daily. 30 tablet 11    clopidogrel (PLAVIX) 75 mg tablet TAKE ONE TABLET BY MOUTH ONCE  "DAILY 30 tablet 5    ergocalciferol (ERGOCALCIFEROL) 50,000 unit Cap Take 1 capsule (50,000 Units total) by mouth every 30 days. Maintenance dose 4 capsule 10    furosemide (LASIX) 20 MG tablet Take 1 tablet (20 mg total) by mouth once daily. 30 tablet 5    insulin aspart (NOVOLOG) 100 unit/mL InPn pen Inject 7 Units into the skin 3 (three) times daily with meals. 5 Box 3    ketorolac 0.5% (ACULAR) 0.5 % Drop Place 1 drop into both eyes 4 (four) times daily. 15 mL 2    phenytoin (DILANTIN) 100 MG ER capsule TAKE ONE CAPSULE BY MOUTH THREE TIMES DAILY 270 capsule 3    tamsulosin (FLOMAX) 0.4 mg Cp24 TAKE TWO CAPSULES BY MOUTH ONCE DAILY 60 capsule 0    VITAMIN D2 50,000 unit capsule TAKE 1 CAPSULE BY MOUTH EVERY 7 DAYS AFTER 2 MONTHS CHANGE TO 1 CAPSULE ONCE MONTHLY 4 capsule 2     No current facility-administered medications for this visit.        Review of Systems   All other systems reviewed and are negative.      Objective:   Physical Exam   Vitals:    05/11/18 1145 05/11/18 1256   BP: (!) 178/104 120/72   BP Location:  Left arm   Patient Position:  Sitting   BP Method:  Large (Manual)   Pulse: 92    Temp: 98.3 °F (36.8 °C)    TempSrc: Oral    SpO2: 98%    Weight: 88.3 kg (194 lb 8.9 oz)    Height: 5' 8" (1.727 m)     There is no height or weight on file to calculate BMI.            Physical Exam   Constitutional: He is oriented to person, place, and time. He appears well-developed and well-nourished. No distress.   Eyes: EOM are normal.   Cardiovascular: Normal rate, regular rhythm and normal heart sounds.    No murmur heard.  Pulmonary/Chest: Effort normal and breath sounds normal.   Musculoskeletal:   Baseline right hemiplegia   Neurological: He is alert and oriented to person, place, and time.   Baseline right hemiplegia  Right sided lower facial droop baseline   Skin: Skin is warm and dry.   Psychiatric: He has a normal mood and affect. His behavior is normal. Thought content normal.     "

## 2018-05-11 ENCOUNTER — LAB VISIT (OUTPATIENT)
Dept: LAB | Facility: HOSPITAL | Age: 63
End: 2018-05-11
Attending: INTERNAL MEDICINE

## 2018-05-11 ENCOUNTER — OFFICE VISIT (OUTPATIENT)
Dept: FAMILY MEDICINE | Facility: CLINIC | Age: 63
End: 2018-05-11
Payer: MEDICARE

## 2018-05-11 VITALS
HEIGHT: 68 IN | HEART RATE: 92 BPM | OXYGEN SATURATION: 98 % | DIASTOLIC BLOOD PRESSURE: 72 MMHG | SYSTOLIC BLOOD PRESSURE: 120 MMHG | WEIGHT: 194.56 LBS | TEMPERATURE: 98 F | BODY MASS INDEX: 29.49 KG/M2

## 2018-05-11 DIAGNOSIS — I69.398 SEIZURE DISORDER AS SEQUELA OF CEREBROVASCULAR ACCIDENT: ICD-10-CM

## 2018-05-11 DIAGNOSIS — I10 BENIGN ESSENTIAL HTN: Chronic | ICD-10-CM

## 2018-05-11 DIAGNOSIS — E55.9 VITAMIN D DEFICIENCY: ICD-10-CM

## 2018-05-11 DIAGNOSIS — N18.30 CONTROLLED TYPE 2 DIABETES MELLITUS WITH STAGE 3 CHRONIC KIDNEY DISEASE, WITHOUT LONG-TERM CURRENT USE OF INSULIN: Primary | ICD-10-CM

## 2018-05-11 DIAGNOSIS — E11.22 CONTROLLED TYPE 2 DIABETES MELLITUS WITH STAGE 3 CHRONIC KIDNEY DISEASE, WITHOUT LONG-TERM CURRENT USE OF INSULIN: Primary | ICD-10-CM

## 2018-05-11 DIAGNOSIS — Z86.73 HISTORY OF STROKE: ICD-10-CM

## 2018-05-11 DIAGNOSIS — I69.30 HISTORY OF STROKE WITH RESIDUAL EFFECTS: Chronic | ICD-10-CM

## 2018-05-11 DIAGNOSIS — E78.5 HYPERLIPIDEMIA, UNSPECIFIED HYPERLIPIDEMIA TYPE: ICD-10-CM

## 2018-05-11 DIAGNOSIS — Z79.4 TYPE 2 DIABETES MELLITUS WITH DIABETIC NEUROPATHY, WITH LONG-TERM CURRENT USE OF INSULIN: ICD-10-CM

## 2018-05-11 DIAGNOSIS — R35.89 POLYURIA: ICD-10-CM

## 2018-05-11 DIAGNOSIS — R60.0 PERIPHERAL EDEMA: ICD-10-CM

## 2018-05-11 DIAGNOSIS — E11.40 TYPE 2 DIABETES MELLITUS WITH DIABETIC NEUROPATHY, WITH LONG-TERM CURRENT USE OF INSULIN: ICD-10-CM

## 2018-05-11 DIAGNOSIS — I10 ESSENTIAL HYPERTENSION: ICD-10-CM

## 2018-05-11 DIAGNOSIS — G40.909 SEIZURE DISORDER AS SEQUELA OF CEREBROVASCULAR ACCIDENT: ICD-10-CM

## 2018-05-11 DIAGNOSIS — N25.81 SECONDARY HYPERPARATHYROIDISM OF RENAL ORIGIN: ICD-10-CM

## 2018-05-11 DIAGNOSIS — Z12.11 SCREENING FOR COLON CANCER: ICD-10-CM

## 2018-05-11 DIAGNOSIS — I69.351 HEMIPLEGIA AND HEMIPARESIS FOLLOWING CEREBRAL INFARCTION AFFECTING RIGHT DOMINANT SIDE: Chronic | ICD-10-CM

## 2018-05-11 LAB
25(OH)D3+25(OH)D2 SERPL-MCNC: 5 NG/ML
CHOLEST SERPL-MCNC: 212 MG/DL
CHOLEST/HDLC SERPL: 3.3 {RATIO}
ESTIMATED AVG GLUCOSE: 131 MG/DL
HBA1C MFR BLD HPLC: 6.2 %
HDLC SERPL-MCNC: 64 MG/DL
HDLC SERPL: 30.2 %
LDLC SERPL CALC-MCNC: 133 MG/DL
NONHDLC SERPL-MCNC: 148 MG/DL
PTH-INTACT SERPL-MCNC: 237 PG/ML
TRIGL SERPL-MCNC: 75 MG/DL

## 2018-05-11 PROCEDURE — 99999 PR PBB SHADOW E&M-EST. PATIENT-LVL III: CPT | Mod: PBBFAC,,, | Performed by: INTERNAL MEDICINE

## 2018-05-11 PROCEDURE — 83970 ASSAY OF PARATHORMONE: CPT

## 2018-05-11 PROCEDURE — 99214 OFFICE O/P EST MOD 30 MIN: CPT | Mod: S$PBB,,, | Performed by: INTERNAL MEDICINE

## 2018-05-11 PROCEDURE — 83036 HEMOGLOBIN GLYCOSYLATED A1C: CPT

## 2018-05-11 PROCEDURE — 82306 VITAMIN D 25 HYDROXY: CPT

## 2018-05-11 PROCEDURE — 80061 LIPID PANEL: CPT

## 2018-05-11 PROCEDURE — 36415 COLL VENOUS BLD VENIPUNCTURE: CPT | Mod: PO

## 2018-05-11 PROCEDURE — 99213 OFFICE O/P EST LOW 20 MIN: CPT | Mod: PBBFAC,PO | Performed by: INTERNAL MEDICINE

## 2018-05-11 RX ORDER — INSULIN ASPART 100 [IU]/ML
7 INJECTION, SOLUTION INTRAVENOUS; SUBCUTANEOUS
Qty: 5 BOX | Refills: 11 | Status: SHIPPED | OUTPATIENT
Start: 2018-05-11 | End: 2019-01-04 | Stop reason: SDUPTHER

## 2018-05-11 RX ORDER — CLOPIDOGREL BISULFATE 75 MG/1
75 TABLET ORAL DAILY
Qty: 90 TABLET | Refills: 3 | Status: SHIPPED | OUTPATIENT
Start: 2018-05-11 | End: 2019-01-04 | Stop reason: SDUPTHER

## 2018-05-11 RX ORDER — FUROSEMIDE 20 MG/1
20 TABLET ORAL DAILY
Qty: 90 TABLET | Refills: 3 | Status: SHIPPED | OUTPATIENT
Start: 2018-05-11 | End: 2018-08-09 | Stop reason: SDUPTHER

## 2018-05-11 RX ORDER — ATORVASTATIN CALCIUM 80 MG/1
80 TABLET, FILM COATED ORAL DAILY
Qty: 90 TABLET | Refills: 3 | Status: SHIPPED | OUTPATIENT
Start: 2018-05-11 | End: 2019-01-04 | Stop reason: SDUPTHER

## 2018-05-11 RX ORDER — PHENYTOIN SODIUM 100 MG/1
100 CAPSULE, EXTENDED RELEASE ORAL 3 TIMES DAILY
Qty: 270 CAPSULE | Refills: 3 | Status: SHIPPED | OUTPATIENT
Start: 2018-05-11 | End: 2019-01-04 | Stop reason: SDUPTHER

## 2018-05-11 RX ORDER — LANCETS 33 GAUGE
1 EACH MISCELLANEOUS DAILY
Qty: 30 EACH | Refills: 11 | Status: SHIPPED | OUTPATIENT
Start: 2018-05-11 | End: 2021-03-30

## 2018-05-11 RX ORDER — DEXTROSE 4 G
1 TABLET,CHEWABLE ORAL DAILY
Qty: 1 EACH | Refills: 0 | Status: SHIPPED | OUTPATIENT
Start: 2018-05-11 | End: 2019-09-09

## 2018-05-11 RX ORDER — PEN NEEDLE, DIABETIC 29 G X1/2"
NEEDLE, DISPOSABLE MISCELLANEOUS
Qty: 300 EACH | Refills: 11 | Status: SHIPPED | OUTPATIENT
Start: 2018-05-11 | End: 2022-08-18

## 2018-05-11 RX ORDER — TAMSULOSIN HYDROCHLORIDE 0.4 MG/1
2 CAPSULE ORAL DAILY
Qty: 60 CAPSULE | Refills: 3 | Status: SHIPPED | OUTPATIENT
Start: 2018-05-11 | End: 2018-12-26 | Stop reason: SDUPTHER

## 2018-05-11 RX ORDER — BENAZEPRIL HYDROCHLORIDE 40 MG/1
40 TABLET ORAL DAILY
Qty: 90 TABLET | Refills: 3 | Status: SHIPPED | OUTPATIENT
Start: 2018-05-11 | End: 2018-07-25 | Stop reason: SDUPTHER

## 2018-05-11 NOTE — PATIENT INSTRUCTIONS
CHECK YOUR MEDICINES AT HOME - THE VITAMIN D 50,000 IU PILL (IF YOU ARE TAKING IT) SHOULD BE ONCE A WEEK, NOT EVERY DAY.

## 2018-05-13 NOTE — PROGRESS NOTES
a1c good  Lipid not ideal but on max dose statin  PTH high; vit D very low.  He said he was taking vit D but does not appear so.    Please call pt - his vitamin D was very low.  I am sending in rx for vitamin D - take every WEEK. Otherwise stay on all other meds.

## 2018-05-14 ENCOUNTER — TELEPHONE (OUTPATIENT)
Dept: FAMILY MEDICINE | Facility: CLINIC | Age: 63
End: 2018-05-14

## 2018-05-14 NOTE — TELEPHONE ENCOUNTER
----- Message from Raciel Raymond MD sent at 5/12/2018  9:38 PM CDT -----  a1c good  Lipid not ideal but on max dose statin  PTH high; vit D very low.  He said he was taking vit D but does not appear so.    Please call pt - his vitamin D was very low.  I am sending in rx for vitamin D - take every WEEK. Otherwise stay on all other meds.

## 2018-05-21 DIAGNOSIS — E55.9 VITAMIN D DEFICIENCY: ICD-10-CM

## 2018-05-21 DIAGNOSIS — N25.81 SECONDARY HYPERPARATHYROIDISM OF RENAL ORIGIN: ICD-10-CM

## 2018-05-21 RX ORDER — ERGOCALCIFEROL 1.25 MG/1
CAPSULE ORAL
Qty: 10 CAPSULE | Refills: 2 | Status: SHIPPED | OUTPATIENT
Start: 2018-05-21 | End: 2018-07-25 | Stop reason: SDUPTHER

## 2018-05-21 NOTE — TELEPHONE ENCOUNTER
----- Message from Beth Tobar sent at 5/21/2018  1:51 PM CDT -----  Contact: Self/ [phone]  Pt calling to speak to nurses about his VIT D prescription. Please call back to advise. Thank you.

## 2018-07-24 PROBLEM — E78.00 PURE HYPERCHOLESTEROLEMIA: Status: ACTIVE | Noted: 2017-03-21

## 2018-07-24 NOTE — PROGRESS NOTES
This note was created by combination of typed  and Dragon dictation.  Transcription errors may be present.  If there are any questions, please contact me.    Assessment & Plan:   Type 2 diabetes mellitus with diabetic neuropathy, with long-term current use of insulin  Controlled type 2 diabetes mellitus with stage 3 chronic kidney disease, with long-term current use of insulin  -check labs today.  Referral to Optometry.  -     Comprehensive metabolic panel; Future; Expected date: 07/25/2018  -     Hemoglobin A1c; Future; Expected date: 07/25/2018  -     Ambulatory referral to Optometry    Secondary hyperparathyroidism of renal origin  Vitamin D deficiency  -he finished his weekly vitamin-D dose and is now on the monthly vitamin-D supplementation.  Check PTH and vitamin-D level.  Takes at the beginning of each month.   -     PTH, intact; Future; Expected date: 07/25/2018        -     Vitamin D; Future; Expected date: 07/25/2018    CKD stage 3 secondary to diabetes  Benign essential HTN  -blood pressure not quite ideal.  Does sound like he could reduce his sodium intake further.  I have given him some information about sodium intake.  He does read labels.  I have recommended he avoid processed meats, processed foods, etc.  He is on Lasix daily though some days he will skip days if he is going to be out and about.  He    Pure hypercholesterolemia - on max dose statin.    Hemiplegia and hemiparesis following cerebral infarction affecting right dominant side    Pedal edema  -no known cardiac dysfunction.  Does have known kidney disease.  Check TSH.  Check BNP.  On Lasix.  For now no change.  Could increase the dose if necessary.  -     Brain natriuretic peptide; Future; Expected date: 07/25/2018  -     TSH; Future; Expected date: 07/25/2018    Screening for colon cancer   -has fitkit at home, needs to perform; however, only has use of 1 arm so difficult for him to perform. He'll try to do it when his daughter  is available.    Other orders  -     Cancel: Vitamin D; Future; Expected date: 07/25/2018    There are no discontinued medications.  Modified Medications    No medications on file     New Prescriptions    No medications on file       Follow Up: No Follow-up on file. f/u labs. Plan for OV 3 months BP check.         Subjective:     Chief Complaint   Patient presents with    3 month checkup       HPI  Miguel is a 62 y.o. male, last appointment with this clinic was 5/11/2018.    DM with neuropathy - diminished vibriception; on novolog only, had stopped lantus previously; a1c 6.3.  Currently 7 units with meals.  Cataracts seen by optometry since OV.  Hx of CVA with R sided hemiplegia  Seizure disorder after CVA, dilantin  Hyperlipidemia started on atorvastatin last OV  HTN benazepril, increased dose previously.  CKD stage 3  Peripheral edema, lasix, taking every other day perhaps.   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  Impacted cerumen, ENT  Secondary hyperPTH  Vit D deficiency    Last seen in May.  Secondary hyperparathyroidism with low vitamin-D.  I wanted him to start a weekly supplement.  Lipid not ideal but on maximum dose statin.    C/o swelling that started about a week ago. Worse as the day goes on. With leg elevation it improves. No chest pain no dyspnea.     Still on insulin 7 units with meals.  120 by recall.  Not daily checks.  Only in the morning.  1 pillow to sleep.  No orthopnea.     Took the vit D weekly and now back to once a month.     Hard for him to do the fitkit. B/c he has one useful arm.       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  Normal RDW      CKD stage 3 secondary to diabetes 03/22/2017    Benign essential HTN 03/21/2017    Pure hypercholesterolemia 03/21/2017    Controlled type 2 diabetes mellitus with stage 3 chronic kidney disease, with long-term current use of insulin 03/21/2017    Benign non-nodular prostatic hyperplasia without lower urinary tract symptoms 03/21/2017    Seizure disorder as sequela of cerebrovascular accident 03/21/2017    Hemiplegia and hemiparesis following cerebral infarction affecting right dominant side 03/21/2017       PAST MEDICAL HISTORY:  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    Stroke     Urinary tract infection        PAST SURGICAL HISTORY:  Past Surgical History:   Procedure Laterality Date    CATARACT EXTRACTION W/  INTRAOCULAR LENS IMPLANT Right 10/05/2017    Dr. Tay    CATARACT EXTRACTION W/  INTRAOCULAR LENS IMPLANT Left 10/19/2017    Dr. Tay    FEMORAL ARTERY STENT      KNEE SURGERY      bilateral scope       SOCIAL HISTORY:  Social History     Social History    Marital status: Single     Spouse name: N/A    Number of children: N/A    Years of  "education: N/A     Occupational History    disabled - former  - dozers, etc      Social History Main Topics    Smoking status: Never Smoker    Smokeless tobacco: Never Used    Alcohol use No    Drug use: No    Sexual activity: Not on file     Other Topics Concern    Not on file     Social History Narrative    No narrative on file       ALLERGIES AND MEDICATIONS: updated and reviewed.  Review of patient's allergies indicates:   Allergen Reactions    Penicillins Hives     Current Outpatient Prescriptions   Medication Sig Dispense Refill    atorvastatin (LIPITOR) 80 MG tablet Take 1 tablet (80 mg total) by mouth once daily. 90 tablet 3    baclofen (LIORESAL) 10 MG tablet TAKE ONE TABLET BY MOUTH 4 TIMES DAILY 120 tablet 5    benazepril (LOTENSIN) 40 MG tablet Take 1 tablet (40 mg total) by mouth once daily. 90 tablet 3    blood sugar diagnostic Strp ONCE DAILY BLOOD SUGAR TESTING; [employer] BR[employer] COVERED BY INSURANCE 30 strip 11    blood-glucose meter Misc 1 each by Misc.(Non-Drug; Combo Route) route once daily. Pharmacy-whichever brand specified by insurance 1 each 0    clopidogrel (PLAVIX) 75 mg tablet Take 1 tablet (75 mg total) by mouth once daily. 90 tablet 3    ergocalciferol (VITAMIN D2) 50,000 unit Cap TAKE 1 CAPSULE BY MOUTH EVERY 7 DAYS AFTER 2 MONTHS CHANGE TO 1 CAPSULE ONCE MONTHLY 10 capsule 2    furosemide (LASIX) 20 MG tablet Take 1 tablet (20 mg total) by mouth once daily. 90 tablet 3    insulin aspart U-100 (NOVOLOG) 100 unit/mL InPn pen Inject 7 Units into the skin 3 (three) times daily with meals. 5 Box 11    ketorolac 0.5% (ACULAR) 0.5 % Drop Place 1 drop into both eyes 4 (four) times daily. 15 mL 2    pen needle, diabetic 31 gauge x 1/4" Ndle For mealtime insulin 300 each 11    phenytoin (DILANTIN) 100 MG ER capsule Take 1 capsule (100 mg total) by mouth 3 (three) times daily. 270 capsule 3    tamsulosin (FLOMAX) 0.4 mg Cp24 Take 2 capsules (0.8 mg total) by " "mouth once daily. 60 capsule 3    lancets (ONETOUCH DELICA LANCETS) 33 gauge Misc 1 lancet by Misc.(Non-Drug; Combo Route) route once daily. ONCE DAILY BLOOD SUGAR TESTING; [employer] COVERED BY INSURANCE 30 each 11     No current facility-administered medications for this visit.        Review of Systems   All other systems reviewed and are negative.      Objective:   Physical Exam   Vitals:    07/25/18 1035 07/25/18 1052   BP: (!) 156/88 (!) 144/80   BP Location:  Left arm   Patient Position:  Sitting   BP Method:  Large (Manual)   Pulse: 68    Temp: 98.1 °F (36.7 °C)    SpO2: 98%    Weight: 85.2 kg (187 lb 13.3 oz)    Height: 5' 8" (1.727 m)     Body mass index is 28.56 kg/m².  Weight: 85.2 kg (187 lb 13.3 oz)   Height: 5' 8" (172.7 cm)     Physical Exam   Constitutional: He is oriented to person, place, and time. He appears well-developed and well-nourished. No distress.   Eyes: EOM are normal.   Cardiovascular: Normal rate, regular rhythm and normal heart sounds.    No murmur heard.  Pulmonary/Chest: Effort normal and breath sounds normal.   Musculoskeletal: Normal range of motion. He exhibits edema.   1+ edema to mid tibiae bilaterally; on compression stockings   Neurological: He is alert and oriented to person, place, and time.   Right facial droop, right hemiplegia baseline   Skin: Skin is warm and dry.   Psychiatric: He has a normal mood and affect. His behavior is normal. Thought content normal.     "

## 2018-07-25 ENCOUNTER — LAB VISIT (OUTPATIENT)
Dept: LAB | Facility: HOSPITAL | Age: 63
End: 2018-07-25
Attending: INTERNAL MEDICINE
Payer: MEDICARE

## 2018-07-25 ENCOUNTER — OFFICE VISIT (OUTPATIENT)
Dept: FAMILY MEDICINE | Facility: CLINIC | Age: 63
End: 2018-07-25
Payer: MEDICARE

## 2018-07-25 ENCOUNTER — OFFICE VISIT (OUTPATIENT)
Dept: PODIATRY | Facility: CLINIC | Age: 63
End: 2018-07-25
Payer: MEDICARE

## 2018-07-25 VITALS
OXYGEN SATURATION: 98 % | WEIGHT: 187.81 LBS | TEMPERATURE: 98 F | HEART RATE: 68 BPM | HEIGHT: 68 IN | SYSTOLIC BLOOD PRESSURE: 144 MMHG | BODY MASS INDEX: 28.46 KG/M2 | DIASTOLIC BLOOD PRESSURE: 80 MMHG

## 2018-07-25 VITALS
DIASTOLIC BLOOD PRESSURE: 74 MMHG | BODY MASS INDEX: 29.4 KG/M2 | HEIGHT: 68 IN | SYSTOLIC BLOOD PRESSURE: 150 MMHG | WEIGHT: 194 LBS

## 2018-07-25 DIAGNOSIS — E55.9 VITAMIN D DEFICIENCY: ICD-10-CM

## 2018-07-25 DIAGNOSIS — I69.351 HEMIPLEGIA AND HEMIPARESIS FOLLOWING CEREBRAL INFARCTION AFFECTING RIGHT DOMINANT SIDE: Chronic | ICD-10-CM

## 2018-07-25 DIAGNOSIS — E87.5 HYPERKALEMIA: Primary | ICD-10-CM

## 2018-07-25 DIAGNOSIS — N18.30 CONTROLLED TYPE 2 DIABETES MELLITUS WITH STAGE 3 CHRONIC KIDNEY DISEASE, WITH LONG-TERM CURRENT USE OF INSULIN: Chronic | ICD-10-CM

## 2018-07-25 DIAGNOSIS — Z86.73 HISTORY OF STROKE: ICD-10-CM

## 2018-07-25 DIAGNOSIS — Z79.4 CONTROLLED TYPE 2 DIABETES MELLITUS WITH STAGE 3 CHRONIC KIDNEY DISEASE, WITH LONG-TERM CURRENT USE OF INSULIN: Chronic | ICD-10-CM

## 2018-07-25 DIAGNOSIS — R53.1 RIGHT SIDED WEAKNESS: ICD-10-CM

## 2018-07-25 DIAGNOSIS — N25.81 SECONDARY HYPERPARATHYROIDISM OF RENAL ORIGIN: ICD-10-CM

## 2018-07-25 DIAGNOSIS — E78.00 PURE HYPERCHOLESTEROLEMIA: ICD-10-CM

## 2018-07-25 DIAGNOSIS — E11.22 CONTROLLED TYPE 2 DIABETES MELLITUS WITH STAGE 3 CHRONIC KIDNEY DISEASE, WITH LONG-TERM CURRENT USE OF INSULIN: Chronic | ICD-10-CM

## 2018-07-25 DIAGNOSIS — N18.30 CKD STAGE 3 SECONDARY TO DIABETES: ICD-10-CM

## 2018-07-25 DIAGNOSIS — I10 BENIGN ESSENTIAL HTN: Chronic | ICD-10-CM

## 2018-07-25 DIAGNOSIS — E11.40 TYPE 2 DIABETES MELLITUS WITH DIABETIC NEUROPATHY, WITH LONG-TERM CURRENT USE OF INSULIN: Primary | ICD-10-CM

## 2018-07-25 DIAGNOSIS — E11.22 CKD STAGE 3 SECONDARY TO DIABETES: ICD-10-CM

## 2018-07-25 DIAGNOSIS — E11.40 TYPE 2 DIABETES MELLITUS WITH DIABETIC NEUROPATHY, WITH LONG-TERM CURRENT USE OF INSULIN: ICD-10-CM

## 2018-07-25 DIAGNOSIS — Z12.11 SCREENING FOR COLON CANCER: ICD-10-CM

## 2018-07-25 DIAGNOSIS — Z79.4 TYPE 2 DIABETES MELLITUS WITH DIABETIC NEUROPATHY, WITH LONG-TERM CURRENT USE OF INSULIN: ICD-10-CM

## 2018-07-25 DIAGNOSIS — I69.30 HISTORY OF STROKE WITH RESIDUAL EFFECTS: Chronic | ICD-10-CM

## 2018-07-25 DIAGNOSIS — R60.0 PEDAL EDEMA: ICD-10-CM

## 2018-07-25 DIAGNOSIS — E11.49 TYPE II DIABETES MELLITUS WITH NEUROLOGICAL MANIFESTATIONS: Primary | ICD-10-CM

## 2018-07-25 DIAGNOSIS — L84 CORN OR CALLUS: ICD-10-CM

## 2018-07-25 DIAGNOSIS — I10 ESSENTIAL HYPERTENSION: ICD-10-CM

## 2018-07-25 DIAGNOSIS — Z79.4 TYPE 2 DIABETES MELLITUS WITH DIABETIC NEUROPATHY, WITH LONG-TERM CURRENT USE OF INSULIN: Primary | ICD-10-CM

## 2018-07-25 DIAGNOSIS — B35.1 ONYCHOMYCOSIS DUE TO DERMATOPHYTE: ICD-10-CM

## 2018-07-25 LAB
25(OH)D3+25(OH)D2 SERPL-MCNC: 13 NG/ML
ALBUMIN SERPL BCP-MCNC: 3.5 G/DL
ALP SERPL-CCNC: 71 U/L
ALT SERPL W/O P-5'-P-CCNC: 18 U/L
ANION GAP SERPL CALC-SCNC: 6 MMOL/L
AST SERPL-CCNC: 17 U/L
BILIRUB SERPL-MCNC: 0.2 MG/DL
BNP SERPL-MCNC: 14 PG/ML
BUN SERPL-MCNC: 44 MG/DL
CALCIUM SERPL-MCNC: 8.8 MG/DL
CHLORIDE SERPL-SCNC: 110 MMOL/L
CO2 SERPL-SCNC: 22 MMOL/L
CREAT SERPL-MCNC: 1.9 MG/DL
EST. GFR  (AFRICAN AMERICAN): 42.4 ML/MIN/1.73 M^2
EST. GFR  (NON AFRICAN AMERICAN): 36.7 ML/MIN/1.73 M^2
ESTIMATED AVG GLUCOSE: 126 MG/DL
GLUCOSE SERPL-MCNC: 118 MG/DL
HBA1C MFR BLD HPLC: 6 %
POTASSIUM SERPL-SCNC: 6.1 MMOL/L
PROT SERPL-MCNC: 6.5 G/DL
PTH-INTACT SERPL-MCNC: 187 PG/ML
SODIUM SERPL-SCNC: 138 MMOL/L
TSH SERPL DL<=0.005 MIU/L-ACNC: 1.12 UIU/ML

## 2018-07-25 PROCEDURE — 83880 ASSAY OF NATRIURETIC PEPTIDE: CPT

## 2018-07-25 PROCEDURE — 99999 PR PBB SHADOW E&M-EST. PATIENT-LVL II: CPT | Mod: PBBFAC,,, | Performed by: PODIATRIST

## 2018-07-25 PROCEDURE — 11056 PARNG/CUTG B9 HYPRKR LES 2-4: CPT | Mod: Q9,S$PBB,, | Performed by: PODIATRIST

## 2018-07-25 PROCEDURE — 83970 ASSAY OF PARATHORMONE: CPT

## 2018-07-25 PROCEDURE — 11721 DEBRIDE NAIL 6 OR MORE: CPT | Mod: 59,Q9,S$PBB, | Performed by: PODIATRIST

## 2018-07-25 PROCEDURE — 99214 OFFICE O/P EST MOD 30 MIN: CPT | Mod: S$PBB,,, | Performed by: INTERNAL MEDICINE

## 2018-07-25 PROCEDURE — 11056 PARNG/CUTG B9 HYPRKR LES 2-4: CPT | Mod: Q9,PBBFAC,PO | Performed by: PODIATRIST

## 2018-07-25 PROCEDURE — 82306 VITAMIN D 25 HYDROXY: CPT

## 2018-07-25 PROCEDURE — 80053 COMPREHEN METABOLIC PANEL: CPT

## 2018-07-25 PROCEDURE — 99214 OFFICE O/P EST MOD 30 MIN: CPT | Mod: PBBFAC,27,PO,25 | Performed by: INTERNAL MEDICINE

## 2018-07-25 PROCEDURE — 99499 UNLISTED E&M SERVICE: CPT | Mod: S$PBB,,, | Performed by: PODIATRIST

## 2018-07-25 PROCEDURE — 99999 PR PBB SHADOW E&M-EST. PATIENT-LVL IV: CPT | Mod: PBBFAC,,, | Performed by: INTERNAL MEDICINE

## 2018-07-25 PROCEDURE — 36415 COLL VENOUS BLD VENIPUNCTURE: CPT | Mod: PO

## 2018-07-25 PROCEDURE — 11721 DEBRIDE NAIL 6 OR MORE: CPT | Mod: Q9,PBBFAC,PO | Performed by: PODIATRIST

## 2018-07-25 PROCEDURE — 84443 ASSAY THYROID STIM HORMONE: CPT

## 2018-07-25 PROCEDURE — 99212 OFFICE O/P EST SF 10 MIN: CPT | Mod: PBBFAC,PO | Performed by: PODIATRIST

## 2018-07-25 PROCEDURE — 83036 HEMOGLOBIN GLYCOSYLATED A1C: CPT

## 2018-07-25 RX ORDER — BENAZEPRIL HYDROCHLORIDE 20 MG/1
20 TABLET ORAL DAILY
Qty: 90 TABLET | Refills: 3 | Status: SHIPPED | OUTPATIENT
Start: 2018-07-25 | End: 2018-08-06 | Stop reason: SINTOL

## 2018-07-25 RX ORDER — ERGOCALCIFEROL 1.25 MG/1
CAPSULE ORAL
Qty: 12 CAPSULE | Refills: 3 | Status: SHIPPED | OUTPATIENT
Start: 2018-07-25 | End: 2019-01-04 | Stop reason: ALTCHOICE

## 2018-07-25 NOTE — PATIENT INSTRUCTIONS
Low-Salt Diet  This diet removes foods that are high in salt. It also limits the amount of salt you use when cooking. It is most often used for people with high blood pressure, edema (fluid retention), and kidney, liver, or heart disease.  Table salt contains the mineral sodium. Your body needs sodium to work normally. But too much sodium can make your health problems worse. Your healthcare provider is recommending a low-salt (also called low-sodium) diet for you. Your total daily allowance of salt is 1,500 to 2,300 milligrams (mg). It is less than 1 teaspoon of table salt. This means you can have only about 500 to 700 mg of sodium at each meal. People with certain health problems should limit salt intake to the lower end of the recommended range.    When you cook, dont add much salt. If you can cook without using salt, even better. Dont add salt to your food at the table.  When shopping, read food labels. Salt is often called sodium on the label. Choose foods that are salt-free, low salt, or very low salt. Note that foods with reduced salt may not lower your salt intake enough.    Beans, potatoes, and pasta  Ok: Dry beans, split peas, lentils, potatoes, rice, macaroni, pasta, spaghetti without added salt  Avoid: Potato chips, tortilla chips, and similar products  Breads and cereals  Ok: Low-sodium breads, rolls, cereals, and cakes; low-salt crackers, matzo crackers  Avoid: Salted crackers, pretzels, popcorn, Tajik toast, pancakes, muffins  Dairy  Ok: Milk, chocolate milk, hot chocolate mix, low-salt cheeses, and yogurt  Avoid: Processed cheese and cheese spreads; Roquefort, Camembert, and cottage cheese; buttermilk, instant breakfast drink  Desserts  Ok: Ice cream, frozen yogurt, juice bars, gelatin, cookies and pies, sugar, honey, jelly, hard candy  Avoid: Most pies, cakes and cookies prepared or processed with salt; instant pudding  Drinks  Ok: Tea, coffee, fizzy (carbonated) drinks, juices  Avoid: Flavored  coffees, electrolyte replacement drinks, sports drinks  Meats  Ok: All fresh meat, fish, poultry, low-salt tuna, eggs, egg substitute  Avoid: Smoked, pickled, brine-cured, or salted meats and fish. This includes arroyo, chipped beef, corned beef, hot dogs, deli meats, ham, kosher meats, salt pork, sausage, canned tuna, salted codfish, smoked salmon, herring, sardines, or anchovies.  Seasonings and spices  Ok: Most seasonings are okay. Good substitutes for salt include: fresh herb blends, hot sauce, lemon, garlic, arzate, vinegar, dry mustard, parsley, cilantro, horseradish, tomato paste, regular margarine, mayonnaise, unsalted butter, cream cheese, vegetable oil, cream, low-salt salad dressing and gravy.  Avoid: Regular ketchup, relishes, pickles, soy sauce, teriyaki sauce, Worcestershire sauce, BBQ sauce, tartar sauce, meat tenderizer, chili sauce, regular gravy, regular salad dressing, salted butter  Soups  Ok: Low-salt soups and broths made with allowed foods  Avoid: Bouillon cubes, soups with smoked or salted meats, regular soup and broth  Vegetables  Ok: Most vegetables are okay; also low-salt tomato and vegetable juices  Avoid: Sauerkraut and other brine-soaked vegetables; pickles and other pickled vegetables; tomato juice, olives  Date Last Reviewed: 8/1/2016 © 2000-2017 [employer]. [address] [place] [id number]. All rights reserved. This information is not intended as a substitute for professional medical care. Always follow your healthcare professional's instructions.

## 2018-07-25 NOTE — PROGRESS NOTES
Subjective:      Patient ID: Miguel Moore is a 62 y.o. male.    Chief Complaint: Diabetes Mellitus (Pcp Dr. Raymond  5/11/18); Diabetic Foot Exam; and Nail Care    Miguel is a 62 y.o. male who presents to the clinic for evaluation and treatment of high risk feet. Miguel has a past medical history of Diabetes mellitus, type 2; Hypertension; Nuclear sclerosis of both eyes (6/9/2017); Stroke; and Urinary tract infection. The patient's chief complaint is long, thick toenails on both feet and calluses on toes of left foot. Routine trimming of these help keep symptoms under control. This patient has documented high risk feet requiring routine maintenance secondary to diabetes mellitis and those secondary complications of diabetes, as mentioned. No other major complaints today. Has hx of stroke and right sided weakness.   PCP: Raciel Raymond MD    Date Last Seen by PCP:   Chief Complaint   Patient presents with    Diabetes Mellitus     Pcp Dr. Raymond  5/11/18    Diabetic Foot Exam    Nail Care         Current shoe gear:   Tennis shoes         Hemoglobin A1C   Date Value Ref Range Status   05/11/2018 6.2 (H) 4.0 - 5.6 % Final     Comment:     According to ADA guidelines, hemoglobin A1c <7.0% represents  optimal control in non-pregnant diabetic patients. Different  metrics may apply to specific patient populations.   Standards of Medical Care in Diabetes-2016.  For the purpose of screening for the presence of diabetes:  <5.7%     Consistent with the absence of diabetes  5.7-6.4%  Consistent with increasing risk for diabetes   (prediabetes)  >or=6.5%  Consistent with diabetes  Currently, no consensus exists for use of hemoglobin A1c  for diagnosis of diabetes for children.  This Hemoglobin A1c assay has significant interference with fetal   hemoglobin   (HbF). The results are invalid for patients with abnormal amounts of   HbF,   including those with known Hereditary Persistence   of Fetal Hemoglobin. Heterozygous  hemoglobin variants (HbAS, HbAC,   HbAD, HbAE, HbA2) do not significantly interfere with this assay;   however, presence of multiple variants in a sample may impact the %   interference.     08/02/2017 6.5 (H) 4.0 - 5.6 % Final     Comment:     According to ADA guidelines, hemoglobin A1c <7.0% represents  optimal control in non-pregnant diabetic patients. Different  metrics may apply to specific patient populations.   Standards of Medical Care in Diabetes-2016.  For the purpose of screening for the presence of diabetes:  <5.7%     Consistent with the absence of diabetes  5.7-6.4%  Consistent with increasing risk for diabetes   (prediabetes)  >or=6.5%  Consistent with diabetes  Currently, no consensus exists for use of hemoglobin A1c  for diagnosis of diabetes for children.  This Hemoglobin A1c assay has significant interference with fetal   hemoglobin   (HbF). The results are invalid for patients with abnormal amounts of   HbF,   including those with known Hereditary Persistence   of Fetal Hemoglobin. Heterozygous hemoglobin variants (HbAS, HbAC,   HbAD, HbAE, HbA2) do not significantly interfere with this assay;   however, presence of multiple variants in a sample may impact the %   interference.     03/21/2017 6.3 (H) 4.5 - 6.2 % Final     Comment:     According to ADA guidelines, hemoglobin A1C <7.0% represents  optimal control in non-pregnant diabetic patients.  Different  metrics may apply to specific populations.   Standards of Medical Care in Diabetes - 2016.  For the purpose of screening for the presence of diabetes:  <5.7%     Consistent with the absence of diabetes  5.7-6.4%  Consistent with increasing risk for diabetes   (prediabetes)  >or=6.5%  Consistent with diabetes  Currently no consensus exists for use of hemoglobin A1C  for diagnosis of diabetes for children.       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     Stroke     Urinary tract infection        Past Surgical History:   Procedure Laterality Date    CATARACT EXTRACTION W/  INTRAOCULAR LENS IMPLANT Right 10/05/2017    Dr. Tay    CATARACT EXTRACTION W/  INTRAOCULAR LENS IMPLANT Left 10/19/2017    Dr. Tay    FEMORAL ARTERY STENT      KNEE SURGERY      bilateral scope       Family History   Problem Relation Age of Onset    Diabetes Mother     Heart disease Mother     Hypertension Mother     Cataracts Mother     No Known Problems Father     Hypertension Sister     No Known Problems Brother     No Known Problems Daughter     No Known Problems Maternal Aunt     No Known Problems Maternal Uncle     No Known Problems Paternal Aunt     No Known Problems Paternal Uncle     No Known Problems Maternal Grandmother     No Known Problems Maternal Grandfather     No Known Problems Paternal Grandmother     No Known Problems Paternal Grandfather     Amblyopia Neg Hx     Blindness Neg Hx     Cancer Neg Hx     Glaucoma Neg Hx     Macular degeneration Neg Hx     Retinal detachment Neg Hx     Strabismus Neg Hx     Stroke Neg Hx     Thyroid disease Neg Hx        Social History     Social History    Marital status: Single     Spouse name: N/A    Number of children: N/A    Years of education: N/A     Occupational History    disabled - former  - dozers, etc      Social History Main Topics    Smoking status: Never Smoker    Smokeless tobacco: Never Used    Alcohol use No    Drug use: No    Sexual activity: Not Asked     Other Topics Concern    None     Social History Narrative    None       Current Outpatient Prescriptions   Medication Sig Dispense Refill    atorvastatin (LIPITOR) 80 MG tablet Take 1 tablet (80 mg total) by mouth once daily. 90 tablet 3    baclofen (LIORESAL) 10 MG tablet TAKE ONE TABLET BY MOUTH 4 TIMES DAILY 120 tablet 5    benazepril (LOTENSIN) 40 MG tablet Take 1 tablet (40 mg total) by mouth once  "daily. 90 tablet 3    blood sugar diagnostic Strp ONCE DAILY BLOOD SUGAR TESTING; [employer] COVERED BY INSURANCE 30 strip 11    blood-glucose meter Misc 1 each by Misc.(Non-Drug; Combo Route) route once daily. Pharmacy-whichever brand specified by insurance 1 each 0    clopidogrel (PLAVIX) 75 mg tablet Take 1 tablet (75 mg total) by mouth once daily. 90 tablet 3    ergocalciferol (VITAMIN D2) 50,000 unit Cap TAKE 1 CAPSULE BY MOUTH EVERY 7 DAYS AFTER 2 MONTHS CHANGE TO 1 CAPSULE ONCE MONTHLY 10 capsule 2    furosemide (LASIX) 20 MG tablet Take 1 tablet (20 mg total) by mouth once daily. 90 tablet 3    insulin aspart U-100 (NOVOLOG) 100 unit/mL InPn pen Inject 7 Units into the skin 3 (three) times daily with meals. 5 Box 11    ketorolac 0.5% (ACULAR) 0.5 % Drop Place 1 drop into both eyes 4 (four) times daily. 15 mL 2    pen needle, diabetic 31 gauge x 1/4" Ndle For mealtime insulin 300 each 11    phenytoin (DILANTIN) 100 MG ER capsule Take 1 capsule (100 mg total) by mouth 3 (three) times daily. 270 capsule 3    tamsulosin (FLOMAX) 0.4 mg Cp24 Take 2 capsules (0.8 mg total) by mouth once daily. 60 capsule 3    lancets (ONETOUCH DELICA LANCETS) 33 gauge Misc 1 lancet by Misc.(Non-Drug; Combo Route) route once daily. ONCE DAILY BLOOD SUGAR TESTING; [employer] COVERED BY INSURANCE 30 each 11     No current facility-administered medications for this visit.        Review of patient's allergies indicates:   Allergen Reactions    Penicillins Hives       Review of Systems   Constitution: Negative for chills and fever.   Cardiovascular: Positive for leg swelling. Negative for chest pain and claudication.   Respiratory: Negative for cough and shortness of breath.    Skin: Positive for dry skin and suspicious lesions (corns/callus).   Musculoskeletal: Positive for muscle weakness.   Gastrointestinal: Negative for nausea and vomiting.   Neurological: Positive for focal weakness (right sided), numbness and " sensory change. Negative for paresthesias.   Psychiatric/Behavioral: Negative for altered mental status.           Objective:      Physical Exam   Constitutional: He is oriented to person, place, and time. He appears well-developed and well-nourished.   HENT:   Head: Normocephalic.   Cardiovascular: Intact distal pulses.    Pulses:       Dorsalis pedis pulses are 2+ on the right side, and 2+ on the left side.        Posterior tibial pulses are 1+ on the right side, and 1+ on the left side.   CRT < 3 sec to tips of toes. 2+ pitting edema noted to b/l LE. No vericosities noted to b/l LEs.      Pulmonary/Chest: No respiratory distress.   Musculoskeletal:   Gastrocnemius equinus noted to b/l ankles with decreased DF noted on exam. MMT 5/5 in DF/PF/Inv/Ev resistance with no reproduction of pain in any direction Right, 3/5 left. Passive range of motion of ankle and pedal joints is painless. Adequate pedal joint ROM.     Rigid hammertoe contractures noted to toes 2-4 R-asymptomatic.     Limited hallux MTPJ ROM with plantarflexion contracture of b/l hallux IPJ, rigid R semi-reducible L.    Neurological: He is alert and oriented to person, place, and time. He has normal strength. A sensory deficit is present.   Light touch, proprioception, and sharp/dull sensation are all intact bilaterally. Protective threshold with the Sweetwater-Wienstein monofilament is intact bilaterally. Vibratory sensation diminished b/l distal foot.      Skin: Skin is warm, dry and intact. Lesion noted. No ecchymosis noted. No erythema.   No open lesions, lacerations or wounds noted. Nails are dystrophic, elongated, thickened with yellow/brown discoloration to R 1 and L 1-5. Remaining toenails normotrophic.  Interdigital spaces clean, dry and intact b/l. No erythema noted to b/l foot. Skin texture thin, shiny, atrophic. Pedal hair absent. Toes cool to touch b/l. Hyperkeratotic lesion noted to left hallux distal medial aspect, plantar L 2nd and 3rd met  heads. Skin lines present to lesion/s. No signs of deep tissue injury.        Psychiatric: He has a normal mood and affect. His behavior is normal. Judgment and thought content normal.   Vitals reviewed.            Assessment:       Encounter Diagnoses   Name Primary?    Type II diabetes mellitus with neurological manifestations Yes    History of stroke     Right sided weakness     Onychomycosis due to dermatophyte     Corn or callus          Plan:       Miguel was seen today for diabetes mellitus, diabetic foot exam and nail care.    Diagnoses and all orders for this visit:    Type II diabetes mellitus with neurological manifestations    History of stroke    Right sided weakness    Onychomycosis due to dermatophyte    Corn or callus      I counseled the patient on his conditions, their implications and medical management.        - Shoe inspection. Diabetic Foot Education. Patient reminded of the importance of good nutrition and blood sugar control to help prevent podiatric complications of diabetes. Patient instructed on proper foot hygeine. We discussed wearing proper shoe gear, daily foot inspections, never walking without protective shoe gear, never putting sharp instruments to feet, routine podiatric nail visits every 2-3 months.      - With patient's permission, nails were aggressively reduced and debrided 1,2,3,4, 5 R and 1,2, 3,4,5 L and filed to their soft tissue attachment mechanically and with electric , removing all offending nail and debris. This was done as Proc B today. Patient tolerated this well and no blood was drawn. Patient reports relief following the procedure.     - The affected area was cleansed with an alcohol prep pad. Next, utilizing a No.15 scalpel, the hyperkeratotic tissues were trimmed from distal L hallux tip, plantar L 2nd and 3rd met heads (3 lesions total), down to appropriate level of skin. Care was taken to remove any nucleated core from the center of the lesion. No  pinpoint bleeding was encountered. The patient tolerated relief following this procedure.     Discussed regular and routine moisturizer to skin of both feet to help improve dry skin. Advised to apply twice daily until resolution of symptoms. Avoid between toes. Recommended Gold Bond Foot cream or Diabetic cream.     Long discussion with patient regarding appropriate, supportive and comfortable shoes. Recommended supportive athletic style shoes with adequate arch supports to alleviate abnormal pressure and improve stability of foot while walking. Avoid flat shoes and barefoot walking as these will exacerbate or worsen symptoms.       RTC 3 months for routine DM foot exam and nail/callus trimming under Medicare.

## 2018-07-26 ENCOUNTER — TELEPHONE (OUTPATIENT)
Dept: FAMILY MEDICINE | Facility: CLINIC | Age: 63
End: 2018-07-26

## 2018-07-26 NOTE — PROGRESS NOTES
K high. Cr elevated. On lasix and ACEI.   PTH better still high > 150. Vit D low - is on monthly vit D.  a1c good. Denies hypoglycemia    Would decrease benazepril and increase vit D to weekly.  Please call pt:  1. Potassium was high - this could be worsened by benazepril - I will decrease the dose.  2. Vit D is low - go back to weekly vitamin D - will update rx.  3. Repeat labs in 1 week BMP - ordered.  Very important.

## 2018-07-26 NOTE — TELEPHONE ENCOUNTER
----- Message from Raciel Raymond MD sent at 7/25/2018  8:52 PM CDT -----  K high. Cr elevated. On lasix and ACEI.   PTH better still high > 150. Vit D low - is on monthly vit D.  a1c good. Denies hypoglycemia    Would decrease benazepril and increase vit D to weekly.  Please call pt:  1. Potassium was high - this could be worsened by benazepril - I will decrease the dose.  2. Vit D is low - go back to weekly vitamin D - will update rx.  3. Repeat labs in 1 week BMP - ordered.  Very important.

## 2018-07-26 NOTE — TELEPHONE ENCOUNTER
Spoke with patient informed of results and medication change. Pt states he will start benazepril 20 mg tomorrow. Lab visit scheduled 8/3/18

## 2018-08-03 ENCOUNTER — LAB VISIT (OUTPATIENT)
Dept: LAB | Facility: HOSPITAL | Age: 63
End: 2018-08-03
Attending: INTERNAL MEDICINE
Payer: MEDICARE

## 2018-08-03 ENCOUNTER — TELEPHONE (OUTPATIENT)
Dept: INTERNAL MEDICINE | Facility: CLINIC | Age: 63
End: 2018-08-03

## 2018-08-03 DIAGNOSIS — E87.5 HYPERKALEMIA: ICD-10-CM

## 2018-08-03 LAB
ANION GAP SERPL CALC-SCNC: 7 MMOL/L
BUN SERPL-MCNC: 46 MG/DL
CALCIUM SERPL-MCNC: 9.1 MG/DL
CHLORIDE SERPL-SCNC: 110 MMOL/L
CO2 SERPL-SCNC: 21 MMOL/L
CREAT SERPL-MCNC: 2 MG/DL
EST. GFR  (AFRICAN AMERICAN): 39.9 ML/MIN/1.73 M^2
EST. GFR  (NON AFRICAN AMERICAN): 34.5 ML/MIN/1.73 M^2
GLUCOSE SERPL-MCNC: 92 MG/DL
POTASSIUM SERPL-SCNC: 6.3 MMOL/L
SODIUM SERPL-SCNC: 138 MMOL/L

## 2018-08-03 PROCEDURE — 36415 COLL VENOUS BLD VENIPUNCTURE: CPT | Mod: PO

## 2018-08-03 PROCEDURE — 80048 BASIC METABOLIC PNL TOTAL CA: CPT

## 2018-08-04 NOTE — PROGRESS NOTES
Contacted by lab for critical K. patient contacted and advised to go to ER to reassess and treat. Advised that elevated K can cause threatening heart problems. [facility] I advised him to seek care there, preferably [facility] that could see his records.

## 2018-08-06 DIAGNOSIS — E87.5 HYPERKALEMIA: Primary | ICD-10-CM

## 2018-08-06 DIAGNOSIS — R68.89 FLU-LIKE SYMPTOMS: ICD-10-CM

## 2018-08-06 NOTE — TELEPHONE ENCOUNTER
8/3 p.m.    Contacted by lab for critical K. patient contacted and advised to go to ER to reassess and treat. Advised that elevated K can cause threatening heart problems. He stated was OOT in [facility]. I advised him to seek care there, preferably [facility] that could see his records.

## 2018-08-06 NOTE — PROGRESS NOTES
K high. CKD stage 3 bordering on stage 4.  Had reduced the benazepril but still high. Is on lasix.  Pt was contacted by on call and instructed to report to an ER for re-evaluation/retesting    Spoke with pt - he was not able to go to an ER - was out of town.  Instructed to stop the benazepril and stay on lasix.  Repeat BMP in 2 days.  Lab appt set.

## 2018-08-07 ENCOUNTER — TELEPHONE (OUTPATIENT)
Dept: FAMILY MEDICINE | Facility: CLINIC | Age: 63
End: 2018-08-07

## 2018-08-07 DIAGNOSIS — I10 BENIGN ESSENTIAL HTN: Primary | Chronic | ICD-10-CM

## 2018-08-07 RX ORDER — AMLODIPINE BESYLATE 5 MG/1
5 TABLET ORAL DAILY
Qty: 30 TABLET | Refills: 2 | Status: SHIPPED | OUTPATIENT
Start: 2018-08-07 | End: 2018-11-05 | Stop reason: SDUPTHER

## 2018-08-07 NOTE — TELEPHONE ENCOUNTER
Spoke with patient and advised of below    He states that he will still come in to do labs tomorrow

## 2018-08-07 NOTE — TELEPHONE ENCOUNTER
----- Message from Christen House sent at 8/7/2018  2:16 PM CDT -----  Contact: self  Pt states Dr. Raymond discontinued his pressure medication and would like to know what he should do now. [phone].

## 2018-08-08 ENCOUNTER — LAB VISIT (OUTPATIENT)
Dept: LAB | Facility: HOSPITAL | Age: 63
End: 2018-08-08
Attending: INTERNAL MEDICINE
Payer: MEDICARE

## 2018-08-08 DIAGNOSIS — E87.5 HYPERKALEMIA: ICD-10-CM

## 2018-08-08 LAB
ANION GAP SERPL CALC-SCNC: 7 MMOL/L
BUN SERPL-MCNC: 38 MG/DL
CALCIUM SERPL-MCNC: 8.8 MG/DL
CHLORIDE SERPL-SCNC: 108 MMOL/L
CO2 SERPL-SCNC: 22 MMOL/L
CREAT SERPL-MCNC: 1.9 MG/DL
EST. GFR  (AFRICAN AMERICAN): 42.4 ML/MIN/1.73 M^2
EST. GFR  (NON AFRICAN AMERICAN): 36.7 ML/MIN/1.73 M^2
GLUCOSE SERPL-MCNC: 102 MG/DL
POTASSIUM SERPL-SCNC: 5.8 MMOL/L
SODIUM SERPL-SCNC: 137 MMOL/L

## 2018-08-08 PROCEDURE — 36415 COLL VENOUS BLD VENIPUNCTURE: CPT | Mod: PO

## 2018-08-08 PROCEDURE — 80048 BASIC METABOLIC PNL TOTAL CA: CPT

## 2018-08-09 DIAGNOSIS — E87.5 HYPERKALEMIA: Primary | ICD-10-CM

## 2018-08-09 DIAGNOSIS — R60.0 PERIPHERAL EDEMA: ICD-10-CM

## 2018-08-09 RX ORDER — FUROSEMIDE 20 MG/1
20 TABLET ORAL 2 TIMES DAILY
Qty: 90 TABLET | Refills: 3
Start: 2018-08-09 | End: 2019-01-04 | Stop reason: SDUPTHER

## 2018-08-09 NOTE — PROGRESS NOTES
K still high. Spoke with pt - started amlodipipne, last ACEI dose was MOnday I think. So off x 2 days at time of labs.  Increase lasix to 20 BID from daily repeat labs 1 week BMP.

## 2018-08-10 DIAGNOSIS — E11.9 TYPE 2 DIABETES MELLITUS WITHOUT COMPLICATION: ICD-10-CM

## 2018-08-14 ENCOUNTER — TELEPHONE (OUTPATIENT)
Dept: PODIATRY | Facility: CLINIC | Age: 63
End: 2018-08-14

## 2018-08-14 NOTE — TELEPHONE ENCOUNTER
----- Message from Jami De La Rosa sent at 8/14/2018 10:23 AM CDT -----  Contact: self   Pt is requesting a call back regarding the new location of the physician. Pt can be reached at [phone].

## 2018-08-14 NOTE — TELEPHONE ENCOUNTER
Patient want to know in case of an emergency, does he has to come to [facility]. I told him he can come to [facility] if he want too or he can go to [facility] in [place] if that is closer to him. Pat. Has appt to see Dr. Douglas in October

## 2018-08-15 ENCOUNTER — LAB VISIT (OUTPATIENT)
Dept: LAB | Facility: HOSPITAL | Age: 63
End: 2018-08-15
Attending: INTERNAL MEDICINE
Payer: MEDICARE

## 2018-08-15 DIAGNOSIS — E87.5 HYPERKALEMIA: ICD-10-CM

## 2018-08-15 LAB
ANION GAP SERPL CALC-SCNC: 5 MMOL/L
BUN SERPL-MCNC: 39 MG/DL
CALCIUM SERPL-MCNC: 9 MG/DL
CHLORIDE SERPL-SCNC: 108 MMOL/L
CO2 SERPL-SCNC: 25 MMOL/L
CREAT SERPL-MCNC: 1.9 MG/DL
EST. GFR  (AFRICAN AMERICAN): 42.1 ML/MIN/1.73 M^2
EST. GFR  (NON AFRICAN AMERICAN): 36.4 ML/MIN/1.73 M^2
GLUCOSE SERPL-MCNC: 100 MG/DL
POTASSIUM SERPL-SCNC: 5.4 MMOL/L
SODIUM SERPL-SCNC: 138 MMOL/L

## 2018-08-15 PROCEDURE — 80048 BASIC METABOLIC PNL TOTAL CA: CPT

## 2018-08-15 PROCEDURE — 36415 COLL VENOUS BLD VENIPUNCTURE: CPT | Mod: PO

## 2018-08-18 DIAGNOSIS — Z86.73 HISTORY OF STROKE: ICD-10-CM

## 2018-08-18 DIAGNOSIS — I69.351 HEMIPLEGIA AND HEMIPARESIS FOLLOWING CEREBRAL INFARCTION AFFECTING RIGHT DOMINANT SIDE: ICD-10-CM

## 2018-08-18 RX ORDER — BACLOFEN 10 MG/1
TABLET ORAL
Qty: 120 TABLET | Refills: 5 | Status: SHIPPED | OUTPATIENT
Start: 2018-08-18 | End: 2019-01-04 | Stop reason: SDUPTHER

## 2018-08-27 DIAGNOSIS — E87.5 HYPERKALEMIA: Primary | ICD-10-CM

## 2018-08-27 NOTE — PROGRESS NOTES
K better still high  Was started on amlodipine  And previously his ACEI was stopped.    Can we get him in for nurse visit for BP check on amlodipine and repeat BMP? Labs ordered

## 2018-08-29 ENCOUNTER — TELEPHONE (OUTPATIENT)
Dept: FAMILY MEDICINE | Facility: CLINIC | Age: 63
End: 2018-08-29

## 2018-08-29 NOTE — TELEPHONE ENCOUNTER
----- Message from Tiffany Figueroa sent at 8/29/2018  9:44 AM CDT -----  Contact: Self   Pt is returning a call. [phone].

## 2018-08-31 ENCOUNTER — CLINICAL SUPPORT (OUTPATIENT)
Dept: FAMILY MEDICINE | Facility: CLINIC | Age: 63
End: 2018-08-31
Payer: MEDICARE

## 2018-08-31 ENCOUNTER — LAB VISIT (OUTPATIENT)
Dept: LAB | Facility: HOSPITAL | Age: 63
End: 2018-08-31
Attending: INTERNAL MEDICINE
Payer: MEDICARE

## 2018-08-31 VITALS — DIASTOLIC BLOOD PRESSURE: 76 MMHG | SYSTOLIC BLOOD PRESSURE: 130 MMHG | OXYGEN SATURATION: 99 % | HEART RATE: 91 BPM

## 2018-08-31 DIAGNOSIS — E87.5 HYPERKALEMIA: ICD-10-CM

## 2018-08-31 DIAGNOSIS — I10 BENIGN ESSENTIAL HTN: Primary | ICD-10-CM

## 2018-08-31 LAB
ANION GAP SERPL CALC-SCNC: 7 MMOL/L
BUN SERPL-MCNC: 33 MG/DL
CALCIUM SERPL-MCNC: 8.7 MG/DL
CHLORIDE SERPL-SCNC: 111 MMOL/L
CO2 SERPL-SCNC: 22 MMOL/L
CREAT SERPL-MCNC: 2 MG/DL
EST. GFR  (AFRICAN AMERICAN): 39.6 ML/MIN/1.73 M^2
EST. GFR  (NON AFRICAN AMERICAN): 34.3 ML/MIN/1.73 M^2
GLUCOSE SERPL-MCNC: 170 MG/DL
POTASSIUM SERPL-SCNC: 5 MMOL/L
SODIUM SERPL-SCNC: 140 MMOL/L

## 2018-08-31 PROCEDURE — 99999 PR PBB SHADOW E&M-EST. PATIENT-LVL II: CPT | Mod: PBBFAC,,,

## 2018-08-31 PROCEDURE — 36415 COLL VENOUS BLD VENIPUNCTURE: CPT | Mod: PO

## 2018-08-31 PROCEDURE — 99499 UNLISTED E&M SERVICE: CPT | Mod: S$PBB,,, | Performed by: INTERNAL MEDICINE

## 2018-08-31 PROCEDURE — 99212 OFFICE O/P EST SF 10 MIN: CPT | Mod: PBBFAC,PO

## 2018-08-31 PROCEDURE — 80048 BASIC METABOLIC PNL TOTAL CA: CPT

## 2018-08-31 NOTE — PROGRESS NOTES
Miguel Moore 63 y.o. male is here today for Blood Pressure check.   History of HTN yes.    Review of patient's allergies indicates:   Allergen Reactions    Penicillins Hives     Creatinine   Date Value Ref Range Status   08/15/2018 1.9 (H) 0.5 - 1.4 mg/dL Final     Sodium   Date Value Ref Range Status   08/15/2018 138 136 - 145 mmol/L Final     Potassium   Date Value Ref Range Status   08/15/2018 5.4 (H) 3.5 - 5.1 mmol/L Final     Comment:     *No Visible Hemolysis   ]  Patient verifies taking blood pressure medications on a regular basis at the same time of the day.     Current Outpatient Medications:     amLODIPine (NORVASC) 5 MG tablet, Take 1 tablet (5 mg total) by mouth once daily., Disp: 30 tablet, Rfl: 2    atorvastatin (LIPITOR) 80 MG tablet, Take 1 tablet (80 mg total) by mouth once daily., Disp: 90 tablet, Rfl: 3    baclofen (LIORESAL) 10 MG tablet, TAKE ONE TABLET BY MOUTH 4 TIMES DAILY, Disp: 120 tablet, Rfl: 5    blood sugar diagnostic Strp, ONCE DAILY BLOOD SUGAR TESTING; [employer] COVERED BY INSURANCE, Disp: 30 strip, Rfl: 11    blood-glucose meter Misc, 1 each by Misc.(Non-Drug; Combo Route) route once daily. Pharmacy-whichever brand specified by insurance, Disp: 1 each, Rfl: 0    clopidogrel (PLAVIX) 75 mg tablet, Take 1 tablet (75 mg total) by mouth once daily., Disp: 90 tablet, Rfl: 3    ergocalciferol (VITAMIN D2) 50,000 unit Cap, TAKE 1 CAPSULE BY MOUTH EVERY 7 DAYS, Disp: 12 capsule, Rfl: 3    furosemide (LASIX) 20 MG tablet, Take 1 tablet (20 mg total) by mouth 2 (two) times daily., Disp: 90 tablet, Rfl: 3    insulin aspart U-100 (NOVOLOG) 100 unit/mL InPn pen, Inject 7 Units into the skin 3 (three) times daily with meals., Disp: 5 Box, Rfl: 11    ketorolac 0.5% (ACULAR) 0.5 % Drop, Place 1 drop into both eyes 4 (four) times daily., Disp: 15 mL, Rfl: 2    lancets (ONETOUCH DELICA LANCETS) 33 gauge Misc, 1 lancet by Misc.(Non-Drug; Combo Route) route once daily. ONCE DAILY  "BLOOD SUGAR TESTING; [employer] COVERED BY INSURANCE, Disp: 30 each, Rfl: 11    pen needle, diabetic 31 gauge x 1/4" Ndle, For mealtime insulin, Disp: 300 each, Rfl: 11    phenytoin (DILANTIN) 100 MG ER capsule, Take 1 capsule (100 mg total) by mouth 3 (three) times daily., Disp: 270 capsule, Rfl: 3    tamsulosin (FLOMAX) 0.4 mg Cp24, Take 2 capsules (0.8 mg total) by mouth once daily., Disp: 60 capsule, Rfl: 3  Does patient have record of home blood pressure readings no. .   Last dose of blood pressure medication was taken at 8:00 this am.  Patient is asymptomatic.   Complains of none.    Vitals:    08/31/18 1402   BP: 130/76   BP Location: Left arm   Patient Position: Sitting   BP Method: Medium (Manual)   Pulse: 91   SpO2: 99%         Dr. Raymond informed of nurse visit.   "

## 2018-09-03 NOTE — PROGRESS NOTES
CKD stable  K WNL now off of ACEI and on amlodipine, nurse visit for BP check - < 140/90  No changes. F/u 3 months.

## 2018-09-29 DIAGNOSIS — R35.89 POLYURIA: ICD-10-CM

## 2018-09-30 RX ORDER — TAMSULOSIN HYDROCHLORIDE 0.4 MG/1
CAPSULE ORAL
Qty: 60 CAPSULE | Refills: 2 | Status: SHIPPED | OUTPATIENT
Start: 2018-09-30 | End: 2018-12-26 | Stop reason: SDUPTHER

## 2018-10-18 ENCOUNTER — TELEPHONE (OUTPATIENT)
Dept: OPHTHALMOLOGY | Facility: CLINIC | Age: 63
End: 2018-10-18

## 2018-10-24 ENCOUNTER — OFFICE VISIT (OUTPATIENT)
Dept: PODIATRY | Facility: CLINIC | Age: 63
End: 2018-10-24
Payer: MEDICARE

## 2018-10-24 VITALS — BODY MASS INDEX: 28.34 KG/M2 | WEIGHT: 187 LBS | HEIGHT: 68 IN

## 2018-10-24 DIAGNOSIS — B35.1 ONYCHOMYCOSIS DUE TO DERMATOPHYTE: ICD-10-CM

## 2018-10-24 DIAGNOSIS — Z86.73 HISTORY OF STROKE: ICD-10-CM

## 2018-10-24 DIAGNOSIS — E11.49 TYPE II DIABETES MELLITUS WITH NEUROLOGICAL MANIFESTATIONS: Primary | ICD-10-CM

## 2018-10-24 DIAGNOSIS — R53.1 RIGHT SIDED WEAKNESS: ICD-10-CM

## 2018-10-24 PROCEDURE — 99213 OFFICE O/P EST LOW 20 MIN: CPT | Mod: PBBFAC,PO | Performed by: PODIATRIST

## 2018-10-24 PROCEDURE — 99999 PR PBB SHADOW E&M-EST. PATIENT-LVL III: CPT | Mod: PBBFAC,,, | Performed by: PODIATRIST

## 2018-10-24 PROCEDURE — 99499 UNLISTED E&M SERVICE: CPT | Mod: S$PBB,,, | Performed by: PODIATRIST

## 2018-10-24 PROCEDURE — 11721 DEBRIDE NAIL 6 OR MORE: CPT | Mod: PBBFAC,PO | Performed by: PODIATRIST

## 2018-10-24 PROCEDURE — 11721 DEBRIDE NAIL 6 OR MORE: CPT | Mod: S$PBB,Q9,, | Performed by: PODIATRIST

## 2018-10-25 NOTE — PROGRESS NOTES
Subjective:      Patient ID: Miguel Moore is a 63 y.o. male.    Chief Complaint: Diabetes Mellitus (PCP Dr Raymond); Diabetic Foot Exam; and Nail Care    Miguel is a 63 y.o. male who presents to the clinic for evaluation and treatment of high risk feet. 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 The patient's chief complaint is long, thick toenails on both feet and calluses on toes of left foot. Routine trimming of these help keep symptoms under control. This patient has documented high risk feet requiring routine maintenance secondary to diabetes mellitis and those secondary complications of diabetes, as mentioned. No other major complaints today. Has hx of stroke and right sided weakness.       PCP: Raciel Raymond MD    Date Last Seen by PCP:   Chief Complaint   Patient presents with    Diabetes Mellitus     PCP Dr Raymond    Diabetic Foot Exam    Nail Care         Current shoe gear:   Tennis shoes         Hemoglobin A1C   Date Value Ref Range Status   07/25/2018 6.0 (H) 4.0 - 5.6 % Final     Comment:     ADA Screening Guidelines:  5.7-6.4%  Consistent with prediabetes  >or=6.5%  Consistent with diabetes  High levels of fetal hemoglobin interfere with the HbA1C  assay. Heterozygous hemoglobin variants (HbS, HgC, etc)do  not significantly interfere with this assay.   However, presence of multiple variants may affect accuracy.     05/11/2018 6.2 (H) 4.0 - 5.6 % Final     Comment:     According to ADA guidelines, hemoglobin A1c <7.0% represents  optimal control in non-pregnant diabetic patients. Different  metrics may apply to specific patient populations.   Standards of Medical Care in Diabetes-2016.  For the purpose of screening for the presence of diabetes:  <5.7%     Consistent with the absence of diabetes  5.7-6.4%  Consistent with increasing risk for diabetes   (prediabetes)  >or=6.5%  Consistent  with diabetes  Currently, no consensus exists for use of hemoglobin A1c  for diagnosis of diabetes for children.  This Hemoglobin A1c assay has significant interference with fetal   hemoglobin   (HbF). The results are invalid for patients with abnormal amounts of   HbF,   including those with known Hereditary Persistence   of Fetal Hemoglobin. Heterozygous hemoglobin variants (HbAS, HbAC,   HbAD, HbAE, HbA2) do not significantly interfere with this assay;   however, presence of multiple variants in a sample may impact the %   interference.     08/02/2017 6.5 (H) 4.0 - 5.6 % Final     Comment:     According to ADA guidelines, hemoglobin A1c <7.0% represents  optimal control in non-pregnant diabetic patients. Different  metrics may apply to specific patient populations.   Standards of Medical Care in Diabetes-2016.  For the purpose of screening for the presence of diabetes:  <5.7%     Consistent with the absence of diabetes  5.7-6.4%  Consistent with increasing risk for diabetes   (prediabetes)  >or=6.5%  Consistent with diabetes  Currently, no consensus exists for use of hemoglobin A1c  for diagnosis of diabetes for children.  This Hemoglobin A1c assay has significant interference with fetal   hemoglobin   (HbF). The results are invalid for patients with abnormal amounts of   HbF,   including those with known Hereditary Persistence   of Fetal Hemoglobin. Heterozygous hemoglobin variants (HbAS, HbAC,   HbAD, HbAE, HbA2) do not significantly interfere with this assay;   however, presence of multiple variants in a sample may impact the %   interference.       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 Tay    CATARACT EXTRACTION W/  INTRAOCULAR LENS  IMPLANT Left 10/19/2017    Dr. Tay    FEMORAL ARTERY STENT      INSERTION-INTRAOCULAR LENS (IOL) Left 10/19/2017    [facility]    INSERTION-INTRAOCULAR LENS (IOL) Right 10/5/2017    [facility]    KNEE SURGERY      bilateral scope    PHACOEMULSIFICATION-ASPIRATION-CATARACT Left 10/19/2017    [facility]    PHACOEMULSIFICATION-ASPIRATION-CATARACT Right 10/5/2017    [facility]       Family History   Problem Relation Age of Onset    Diabetes Mother     Heart disease Mother     Hypertension Mother     Cataracts Mother     No Known Problems Father     Hypertension Sister     No Known Problems Brother     No Known Problems Daughter     No Known Problems Maternal Aunt     No Known Problems Maternal Uncle     No Known Problems Paternal Aunt     No Known Problems Paternal Uncle     No Known Problems Maternal Grandmother     No Known Problems Maternal Grandfather     No Known Problems Paternal Grandmother     No Known Problems Paternal Grandfather     Amblyopia Neg Hx     Blindness Neg Hx     Cancer Neg Hx     Glaucoma Neg Hx     Macular degeneration Neg Hx     Retinal detachment Neg Hx     Strabismus Neg Hx     Stroke Neg Hx     Thyroid disease Neg Hx        Social History     Socioeconomic History    Marital status: Single     Spouse name: None    Number of children: None    Years of education: None    Highest education level: None   Social Needs    Financial resource strain: None    Food insecurity - worry: None    Food insecurity - inability: None    Transportation needs - medical: None    Transportation needs - non-medical: None   Occupational History    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, etc   Tobacco Use    Smoking status: Never Smoker    Smokeless tobacco: Never Used   Substance and Sexual  "Activity    Alcohol use: No    Drug use: No    Sexual activity: None   Other Topics Concern    None   Social History Narrative    None       Current Outpatient Medications   Medication Sig Dispense Refill    amLODIPine (NORVASC) 5 MG tablet Take 1 tablet (5 mg total) by mouth once daily. 30 tablet 2    atorvastatin (LIPITOR) 80 MG tablet Take 1 tablet (80 mg total) by mouth once daily. 90 tablet 3    baclofen (LIORESAL) 10 MG tablet TAKE ONE TABLET BY MOUTH 4 TIMES DAILY 120 tablet 5    blood sugar diagnostic Strp ONCE DAILY BLOOD SUGAR TESTING; [employer] COVERED BY INSURANCE 30 strip 11    blood-glucose meter Misc 1 each by Misc.(Non-Drug; Combo Route) route once daily. Pharmacy-whichever brand specified by insurance 1 each 0    clopidogrel (PLAVIX) 75 mg tablet Take 1 tablet (75 mg total) by mouth once daily. 90 tablet 3    ergocalciferol (VITAMIN D2) 50,000 unit Cap TAKE 1 CAPSULE BY MOUTH EVERY 7 DAYS 12 capsule 3    furosemide (LASIX) 20 MG tablet Take 1 tablet (20 mg total) by mouth 2 (two) times daily. 90 tablet 3    insulin aspart U-100 (NOVOLOG) 100 unit/mL InPn pen Inject 7 Units into the skin 3 (three) times daily with meals. 5 Box 11    ketorolac 0.5% (ACULAR) 0.5 % Drop Place 1 drop into both eyes 4 (four) times daily. 15 mL 2    pen needle, diabetic 31 gauge x 1/4" Ndle For mealtime insulin 300 each 11    phenytoin (DILANTIN) 100 MG ER capsule Take 1 capsule (100 mg total) by mouth 3 (three) times daily. 270 capsule 3    tamsulosin (FLOMAX) 0.4 mg Cap TAKE 2 CAPSULES BY MOUTH ONCE DAILY 60 capsule 2    tamsulosin (FLOMAX) 0.4 mg Cp24 Take 2 capsules (0.8 mg total) by mouth once daily. 60 capsule 3    lancets (ONETOUCH DELICA LANCETS) 33 gauge Misc 1 lancet by Misc.(Non-Drug; Combo Route) route once daily. ONCE DAILY BLOOD SUGAR TESTING; [employer] COVERED BY INSURANCE 30 each 11     No current facility-administered medications for this visit.        Review of patient's " allergies indicates:   Allergen Reactions    Penicillins Hives       Review of Systems   Constitution: Negative for chills and fever.   Cardiovascular: Positive for leg swelling. Negative for chest pain and claudication.   Respiratory: Negative for cough and shortness of breath.    Skin: Positive for dry skin and suspicious lesions (corns/callus).   Musculoskeletal: Positive for muscle weakness.   Gastrointestinal: Negative for nausea and vomiting.   Neurological: Positive for focal weakness (right sided), numbness and sensory change. Negative for paresthesias.   Psychiatric/Behavioral: Negative for altered mental status.           Objective:      Physical Exam   Constitutional: He is oriented to person, place, and time. He appears well-developed and well-nourished.   HENT:   Head: Normocephalic.   Cardiovascular: Intact distal pulses.   Pulses:       Dorsalis pedis pulses are 2+ on the right side, and 2+ on the left side.        Posterior tibial pulses are 1+ on the right side, and 1+ on the left side.   CRT < 3 sec to tips of toes. 2+ pitting edema noted to b/l LE. No vericosities noted to b/l LEs.      Pulmonary/Chest: No respiratory distress.   Musculoskeletal:   Gastrocnemius equinus noted to b/l ankles with decreased DF noted on exam. MMT 5/5 in DF/PF/Inv/Ev resistance with no reproduction of pain in any direction Right, 3/5 left. Passive range of motion of ankle and pedal joints is painless. Adequate pedal joint ROM.     Rigid hammertoe contractures noted to toes 2-4 R-asymptomatic.     Limited hallux MTPJ ROM with plantarflexion contracture of b/l hallux IPJ, rigid R semi-reducible L.    Neurological: He is alert and oriented to person, place, and time. He has normal strength. A sensory deficit is present.   Light touch, proprioception, and sharp/dull sensation are all intact bilaterally. Protective threshold with the [place]-Wienstein monofilament is intact bilaterally. Vibratory sensation diminished b/l  distal foot.      Skin: Skin is warm, dry and intact. Lesion noted. No ecchymosis noted. No erythema.   No open lesions, lacerations or wounds noted. Nails are dystrophic, elongated, thickened with yellow/brown discoloration to R 1 and L 1-5. Remaining toenails normotrophic.  Interdigital spaces clean, dry and intact b/l. No erythema noted to b/l foot. Skin texture thin, shiny, atrophic. Pedal hair absent. Toes cool to touch b/l.        Psychiatric: He has a normal mood and affect. His behavior is normal. Judgment and thought content normal.   Vitals reviewed.            Assessment:       Encounter Diagnoses   Name Primary?    Type II diabetes mellitus with neurological manifestations Yes    History of stroke     Right sided weakness     Onychomycosis due to dermatophyte          Plan:       Miguel was seen today for diabetes mellitus, diabetic foot exam and nail care.    Diagnoses and all orders for this visit:    Type II diabetes mellitus with neurological manifestations  -     Routine Foot Care    History of stroke    Right sided weakness    Onychomycosis due to dermatophyte  -     Routine Foot Care      I counseled the patient on his conditions, their implications and medical management.    - Shoe inspection. Diabetic Foot Education. Patient reminded of the importance of good nutrition and blood sugar control to help prevent podiatric complications of diabetes. Patient instructed on proper foot hygeine. We discussed wearing proper shoe gear, daily foot inspections, never walking without protective shoe gear, never putting sharp instruments to feet, routine podiatric nail visits every 2-3 months.      See routine foot care procedure note for nail debridement     Discussed regular and routine moisturizer to skin of both feet to help improve dry skin. Advised to apply twice daily until resolution of symptoms. Avoid between toes. Recommended Gold Bond Foot cream or Diabetic cream.     Long discussion with  patient regarding appropriate, supportive and comfortable shoes. Recommended supportive athletic style shoes with adequate arch supports to alleviate abnormal pressure and improve stability of foot while walking. Avoid flat shoes and barefoot walking as these will exacerbate or worsen symptoms.       RTC 3 months     Sabi Douglas DPM

## 2018-10-25 NOTE — PROCEDURES
Routine Foot Care  Date/Time: 10/24/2018 10:00 AM  Performed by: Sabi Douglas DPM  Authorized by: Sabi Douglas DPM     Hyperkeratotic Skin Lesions?: No      Nail Care Type:  Debride  Location(s): All  (Left 1st Toe, Left 3rd Toe, Left 2nd Toe, Left 4th Toe, Left 5th Toe, Right 1st Toe, Right 2nd Toe, Right 3rd Toe, Right 4th Toe and Right 5th Toe)  Patient tolerance:  Patient tolerated the procedure well with no immediate complications

## 2018-11-05 DIAGNOSIS — I10 BENIGN ESSENTIAL HTN: Chronic | ICD-10-CM

## 2018-11-05 RX ORDER — AMLODIPINE BESYLATE 5 MG/1
TABLET ORAL
Qty: 30 TABLET | Refills: 2 | Status: CANCELLED | OUTPATIENT
Start: 2018-11-05

## 2018-11-05 RX ORDER — AMLODIPINE BESYLATE 5 MG/1
5 TABLET ORAL DAILY
Qty: 90 TABLET | Refills: 1 | Status: SHIPPED | OUTPATIENT
Start: 2018-11-05 | End: 2019-01-04 | Stop reason: SDUPTHER

## 2018-11-05 NOTE — TELEPHONE ENCOUNTER
----- Message from Bonifacio Gallego sent at 11/5/2018 11:33 AM CST -----  Contact: Self/[phone]  Refill:amLODIPine (NORVASC) 5 MG tablet      [facility] - [place], [place] - [address] [place] [address] [phone] (Phone)  [phone] (Fax)    Thank you

## 2018-12-11 ENCOUNTER — PATIENT OUTREACH (OUTPATIENT)
Dept: ADMINISTRATIVE | Facility: HOSPITAL | Age: 63
End: 2018-12-11

## 2018-12-26 DIAGNOSIS — R35.89 POLYURIA: ICD-10-CM

## 2018-12-26 RX ORDER — TAMSULOSIN HYDROCHLORIDE 0.4 MG/1
CAPSULE ORAL
Qty: 180 CAPSULE | Refills: 0 | Status: SHIPPED | OUTPATIENT
Start: 2018-12-26 | End: 2019-01-04 | Stop reason: SDUPTHER

## 2019-01-03 NOTE — PROGRESS NOTES
This note was created by combination of typed  and Dragon dictation.  Transcription errors may be present.  If there are any questions, please contact me.    Assessment & Plan:   DM type 2 without retinopathy  Controlled type 2 diabetes mellitus with stage 3 chronic kidney disease, with long-term current use of insulin  Type 2 diabetes mellitus with diabetic neuropathy, with long-term current use of insulin  -check labs on NovoLog.  Contraindication ACE-inhibitor and ARB because of hyperkalemia.  Referral to home health.  Transportation has been an issue for him.  Hemiplegic.  For periodic glucose monitoring.  -     insulin aspart U-100 (NOVOLOG) 100 unit/mL InPn pen; Inject 7 Units into the skin 3 (three) times daily with meals.  Dispense: 5 Box; Refill: 11  -     Comprehensive metabolic panel; Future; Expected date: 01/04/2019  -     Hemoglobin A1c; Future; Expected date: 01/04/2019  -     Ambulatory referral to Home Health    Benign essential HTN; ACEI hyperK  CKD stage 3 secondary to diabetes  Secondary hyperparathyroidism of renal origin  -stable.  Not on Ace inhibitor or ARB because of hyperkalemia.  On amlodipine.  Blood pressure is okay.  Check urine microalbumin.  Check vitamin-D.  Elevated PTH previously.  Recommended he start vitamin-D 1000 daily.  Over-the-counter.  -     amLODIPine (NORVASC) 5 MG tablet; Take 1 tablet (5 mg total) by mouth once daily.  Dispense: 90 tablet; Refill: 3  -     Vitamin D; Future; Expected date: 01/04/2019  -     Microalbumin/creatinine urine ratio; Future; Expected date: 01/04/2019  -     CBC auto differential; Future; Expected date: 01/04/2019  -     Comprehensive metabolic panel; Future; Expected date: 01/04/2019  -     cholecalciferol, vitamin D3, (VITAMIN D3) 1,000 unit capsule; Take 1 capsule (1,000 Units total) by mouth once daily.  Dispense: 90 capsule; Refill: 3    Hemiplegia and hemiparesis following cerebral infarction affecting right dominant  side  Seizure disorder as sequela of cerebrovascular accident  History of stroke  -chronic spasticity of the right side.  He had fevers see found physical therapy to be helpful.  After discussion I will refer him for home physical therapy occupational therapy.  Transportation is an issue for him.  Homebound because of hemiplegia.  He is interested in a motorized wheelchair and I have submitted a referral for Physical Medicine and Rehabilitation for motorized wheelchair evaluation.  -     baclofen (LIORESAL) 10 MG tablet; Take 1 tablet (10 mg total) by mouth 4 (four) times daily.  Dispense: 120 tablet; Refill: 5  -     clopidogrel (PLAVIX) 75 mg tablet; Take 1 tablet (75 mg total) by mouth once daily.  Dispense: 90 tablet; Refill: 3  -     Ambulatory referral to Physical Medicine Rehab  -     Ambulatory referral to Home Health  -     baclofen (LIORESAL) 10 MG tablet; Take 1 tablet (10 mg total) by mouth 4 (four) times daily.  Dispense: 120 tablet; Refill: 5  -     phenytoin (DILANTIN) 100 MG ER capsule; Take 1 capsule (100 mg total) by mouth 3 (three) times daily.  Dispense: 270 capsule; Refill: 3    Need for influenza vaccination  -     Influenza - Quadrivalent (3 years & older) (PF)    Hyperlipidemia, unspecified hyperlipidemia type  -stable on high-dose Lipitor no change  -     atorvastatin (LIPITOR) 80 MG tablet; Take 1 tablet (80 mg total) by mouth once daily.  Dispense: 90 tablet; Refill: 3    Peripheral edema  -he really takes Lasix on an as-needed basis.  Refilled today.  -     furosemide (LASIX) 20 MG tablet; Take 1 tablet (20 mg total) by mouth 2 (two) times daily.  Dispense: 90 tablet; Refill: 3    Polyuria  -refill tamsulosin  -     tamsulosin (FLOMAX) 0.4 mg Cap; Take 2 capsules (0.8 mg total) by mouth once daily.  Dispense: 180 capsule; Refill: 3    FitKit was given to patient on 1/4/2019 2:16 PM     Medications Discontinued During This Encounter   Medication Reason    ergocalciferol (VITAMIN D2)  50,000 unit Cap Therapy completed    amLODIPine (NORVASC) 5 MG tablet Reorder    atorvastatin (LIPITOR) 80 MG tablet Reorder    baclofen (LIORESAL) 10 MG tablet Reorder    clopidogrel (PLAVIX) 75 mg tablet Reorder    furosemide (LASIX) 20 MG tablet Reorder    insulin aspart U-100 (NOVOLOG) 100 unit/mL InPn pen Reorder    phenytoin (DILANTIN) 100 MG ER capsule Reorder    tamsulosin (FLOMAX) 0.4 mg Cap Reorder       meds sent this encounter:  Medications Ordered This Encounter   Medications    amLODIPine (NORVASC) 5 MG tablet     Sig: Take 1 tablet (5 mg total) by mouth once daily.     Dispense:  90 tablet     Refill:  3          atorvastatin (LIPITOR) 80 MG tablet     Sig: Take 1 tablet (80 mg total) by mouth once daily.     Dispense:  90 tablet     Refill:  3          baclofen (LIORESAL) 10 MG tablet     Sig: Take 1 tablet (10 mg total) by mouth 4 (four) times daily.     Dispense:  120 tablet     Refill:  5          cholecalciferol, vitamin D3, (VITAMIN D3) 1,000 unit capsule     Sig: Take 1 capsule (1,000 Units total) by mouth once daily.     Dispense:  90 capsule     Refill:  3          clopidogrel (PLAVIX) 75 mg tablet     Sig: Take 1 tablet (75 mg total) by mouth once daily.     Dispense:  90 tablet     Refill:  3          furosemide (LASIX) 20 MG tablet     Sig: Take 1 tablet (20 mg total) by mouth 2 (two) times daily.     Dispense:  90 tablet     Refill:  3          insulin aspart U-100 (NOVOLOG) 100 unit/mL InPn pen     Sig: Inject 7 Units into the skin 3 (three) times daily with meals.     Dispense:  5 Box     Refill:  11           Order Specific Question:   This medication typically requires a prior authorization. For prior auth services, patient financial assistance, patient education and medication management send to an [facility] retail pharmacy.     Answer:   No, I will select a different retail pharmacy    phenytoin (DILANTIN) 100 MG ER capsule     Sig: Take 1 capsule (100 mg total) by  mouth 3 (three) times daily.     Dispense:  270 capsule     Refill:  3          tamsulosin (FLOMAX) 0.4 mg Cap     Sig: Take 2 capsules (0.8 mg total) by mouth once daily.     Dispense:  180 capsule     Refill:  3             Follow Up: No Follow-up on file. OV 6 months.    Subjective:     Chief Complaint   Patient presents with    Diabetes    Hypertension     recall       HPI  Miguel is a 63 y.o. male, last appointment with this clinic was 8/31/2018.    DM with neuropathy - diminished vibriception; on novolog only, had stopped lantus previously; a1c 6.3.  Currently 7 units with meals.  Cataracts seen by optometry since OV.  Hx of CVA with R sided hemiplegia  Seizure disorder after CVA, dilantin  Hyperlipidemia started on atorvastatin last OV  HTN benazepril, increased dose previously.  CKD stage 3  Peripheral edema, lasix, taking every other day perhaps.   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  Impacted cerumen, ENT  Secondary hyperPTH  Vit D deficiency     Last seen 7/2018 - K high. Decreased benazepril and then stopped it for persisting high k.  Changed vit D to weekly.  Started amlodipine.    novolog 7 with meals.  glc checks twice daily. By recall 120s. 2 episodes of hypoglycemia > 1 month ago.      Is interested in [employer]. Transportation is an issue.  His fiancee would be unable to propel him in a manual wheelchair.  Because of his hemiplegia it is difficult for him to walk long distances.  Had previously found physical therapy to be somewhat helpful.  He has chronic contractures of his right hand.  Limited use of the hand and I am not sure if we can increase this.  So I am going to submit for home health physical therapy and occupational therapy.    He takes Lasix on an as-needed basis.  If he gets particularly swollen he may take 1 or maybe even 2 doses of Lasix.  But he does not take it every day.    Patient Care Team:  Raciel Raymond,  MD as PCP - General (Internal Medicine)  Gabriella Manjarrez DPM as Consulting Physician (Podiatry)  Mac Chambers Jr., MD as Consulting Physician (Urology)  Geovany Rucker MD as Consulting Physician (Neurology)  Bob Tay MD as Consulting Physician (Ophthalmology)    Patient Active Problem List    Diagnosis Date Noted    Type 2 diabetes mellitus with diabetic neuropathy, with long-term current use of insulin 05/10/2018    History of stroke 12/04/2017    CME (cystoid macular edema), bilateral 11/16/2017    Refractive error 11/16/2017    Post-operative state 10/06/2017    Senile nuclear sclerosis 10/05/2017    Right sided weakness 09/11/2017    Secondary hyperparathyroidism of renal origin 08/03/2017    Vitamin D deficiency 08/03/2017    Impaired functional mobility, balance, gait, and endurance 06/23/2017    Nuclear sclerosis, left 06/09/2017    Cortical cataract of both eyes 06/09/2017    DM type 2 without retinopathy 06/09/2017    Microcytosis 03/22/2017     Seen on admission as well 2015; normal Hb, low MCV.  Normal RDW      CKD stage 3 secondary to diabetes 03/22/2017    Benign essential HTN; ACEI hyperK 03/21/2017    Pure hypercholesterolemia 03/21/2017    Controlled type 2 diabetes mellitus with stage 3 chronic kidney disease, with long-term current use of insulin 03/21/2017    Benign non-nodular prostatic hyperplasia without lower urinary tract symptoms 03/21/2017    Seizure disorder as sequela of cerebrovascular accident 03/21/2017    Hemiplegia and hemiparesis following cerebral infarction affecting right dominant side 03/21/2017       PAST MEDICAL HISTORY:  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    Stroke     Urinary tract infection        PAST SURGICAL HISTORY:  Past Surgical History:   Procedure Laterality Date    CATARACT EXTRACTION W/  INTRAOCULAR LENS IMPLANT Right 10/05/2017     Dr. Tay    CATARACT EXTRACTION W/  INTRAOCULAR LENS IMPLANT Left 10/19/2017    Dr. Tay    FEMORAL ARTERY STENT      INSERTION-INTRAOCULAR LENS (IOL) Left 10/19/2017    [facility]    INSERTION-INTRAOCULAR LENS (IOL) Right 10/5/2017    [facility]    KNEE SURGERY      bilateral scope    PHACOEMULSIFICATION-ASPIRATION-CATARACT Left 10/19/2017    [facility]    PHACOEMULSIFICATION-ASPIRATION-CATARACT Right 10/5/2017    [facility]       SOCIAL HISTORY:  Social History     Socioeconomic History    Marital status: Single     Spouse name: Not on file    Number of children: Not on file    Years of education: Not on file    Highest education level: Not on file   Social Needs    Financial resource strain: Not on file    Food insecurity - worry: Not on file    Food insecurity - inability: Not on file    Transportation needs - medical: Not on file    Transportation needs - non-medical: Not on file   Occupational History    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, etc   Tobacco Use    Smoking status: Never Smoker    Smokeless tobacco: Never Used   Substance and Sexual Activity    Alcohol use: No    Drug use: No    Sexual activity: Not on file   Other Topics Concern    Not on file   Social History Narrative    Not on file       ALLERGIES AND MEDICATIONS: updated and reviewed.  Review of patient's allergies indicates:   Allergen Reactions    Ace inhibitors      Hyperkalemia 8/2018    Penicillins Hives     Current Outpatient Medications   Medication Sig Dispense Refill    amLODIPine (NORVASC) 5 MG tablet Take 1 tablet (5 mg total) by mouth once daily. 90 tablet 1    atorvastatin (LIPITOR) 80 MG tablet Take 1 tablet (80 mg total) by mouth once daily. 90 tablet 3    baclofen (LIORESAL) 10 MG tablet TAKE ONE TABLET BY MOUTH  "4 TIMES DAILY 120 tablet 5    blood sugar diagnostic Strp ONCE DAILY BLOOD SUGAR TESTING; [employer] BRAND COVERED BY INSURANCE 30 strip 11    blood-glucose meter Misc 1 each by Misc.(Non-Drug; Combo Route) route once daily. Pharmacy-whichever brand specified by insurance 1 each 0    clopidogrel (PLAVIX) 75 mg tablet Take 1 tablet (75 mg total) by mouth once daily. 90 tablet 3    ergocalciferol (VITAMIN D2) 50,000 unit Cap TAKE 1 CAPSULE BY MOUTH EVERY 7 DAYS 12 capsule 3    furosemide (LASIX) 20 MG tablet Take 1 tablet (20 mg total) by mouth 2 (two) times daily. 90 tablet 3    insulin aspart U-100 (NOVOLOG) 100 unit/mL InPn pen Inject 7 Units into the skin 3 (three) times daily with meals. 5 Box 11    lancets (ONETOUCH DELICA LANCETS) 33 gauge Misc 1 lancet by Misc.(Non-Drug; Combo Route) route once daily. ONCE DAILY BLOOD SUGAR TESTING; [employer] BRAND COVERED BY INSURANCE 30 each 11    pen needle, diabetic 31 gauge x 1/4" Ndle For mealtime insulin 300 each 11    phenytoin (DILANTIN) 100 MG ER capsule Take 1 capsule (100 mg total) by mouth 3 (three) times daily. 270 capsule 3    tamsulosin (FLOMAX) 0.4 mg Cap TAKE 2 CAPSULES BY MOUTH ONCE DAILY 180 capsule 0     No current facility-administered medications for this visit.        Review of Systems   Constitutional: Negative for chills and fever.   Cardiovascular: Negative for chest pain and palpitations.       Objective:   Physical Exam   Vitals:    01/04/19 1401   BP: 138/88   Pulse: 72   Temp: 98.3 °F (36.8 °C)   SpO2: 98%   Weight: 84.8 kg (187 lb)   Height: 5' 8" (1.727 m)    Body mass index is 28.43 kg/m².  Weight: 84.8 kg (187 lb)   Height: 5' 8" (172.7 cm)     Physical Exam   Constitutional: He is oriented to person, place, and time. He appears well-developed and well-nourished. No distress.   Eyes: EOM are normal.   Cardiovascular: Normal rate, regular rhythm and normal heart sounds.   No murmur heard.  Pulmonary/Chest: Effort normal and breath " sounds normal.   Musculoskeletal: He exhibits edema.   Spastic hemiplegia of the right side.  The right hand flexion contracture.  Some mild swelling of the hand which I think is from dependent edema.  Some swelling of the right leg less than the left.  Spastic hemiplegia of the right leg as well.  Ambulating with assistance of a cane.   Neurological: He is alert and oriented to person, place, and time.   Hemiplegia of the right side stable and chronic   Skin: Skin is warm and dry.   Psychiatric: He has a normal mood and affect. His behavior is normal. Thought content normal.

## 2019-01-04 ENCOUNTER — LAB VISIT (OUTPATIENT)
Dept: LAB | Facility: HOSPITAL | Age: 64
End: 2019-01-04
Attending: INTERNAL MEDICINE
Payer: MEDICARE

## 2019-01-04 ENCOUNTER — OFFICE VISIT (OUTPATIENT)
Dept: FAMILY MEDICINE | Facility: CLINIC | Age: 64
End: 2019-01-04
Payer: MEDICARE

## 2019-01-04 VITALS
HEART RATE: 72 BPM | DIASTOLIC BLOOD PRESSURE: 88 MMHG | TEMPERATURE: 98 F | OXYGEN SATURATION: 98 % | SYSTOLIC BLOOD PRESSURE: 138 MMHG | BODY MASS INDEX: 28.34 KG/M2 | WEIGHT: 187 LBS | HEIGHT: 68 IN

## 2019-01-04 DIAGNOSIS — E11.22 CKD STAGE 3 SECONDARY TO DIABETES: ICD-10-CM

## 2019-01-04 DIAGNOSIS — Z79.4 CONTROLLED TYPE 2 DIABETES MELLITUS WITH STAGE 3 CHRONIC KIDNEY DISEASE, WITH LONG-TERM CURRENT USE OF INSULIN: Chronic | ICD-10-CM

## 2019-01-04 DIAGNOSIS — Z79.4 TYPE 2 DIABETES MELLITUS WITH DIABETIC NEUROPATHY, WITH LONG-TERM CURRENT USE OF INSULIN: ICD-10-CM

## 2019-01-04 DIAGNOSIS — Z86.73 HISTORY OF STROKE: ICD-10-CM

## 2019-01-04 DIAGNOSIS — E11.40 TYPE 2 DIABETES MELLITUS WITH DIABETIC NEUROPATHY, WITH LONG-TERM CURRENT USE OF INSULIN: ICD-10-CM

## 2019-01-04 DIAGNOSIS — I69.398 SEIZURE DISORDER AS SEQUELA OF CEREBROVASCULAR ACCIDENT: Chronic | ICD-10-CM

## 2019-01-04 DIAGNOSIS — E11.22 CONTROLLED TYPE 2 DIABETES MELLITUS WITH STAGE 3 CHRONIC KIDNEY DISEASE, WITHOUT LONG-TERM CURRENT USE OF INSULIN: ICD-10-CM

## 2019-01-04 DIAGNOSIS — N18.30 CONTROLLED TYPE 2 DIABETES MELLITUS WITH STAGE 3 CHRONIC KIDNEY DISEASE, WITH LONG-TERM CURRENT USE OF INSULIN: Chronic | ICD-10-CM

## 2019-01-04 DIAGNOSIS — E11.22 CONTROLLED TYPE 2 DIABETES MELLITUS WITH STAGE 3 CHRONIC KIDNEY DISEASE, WITH LONG-TERM CURRENT USE OF INSULIN: Chronic | ICD-10-CM

## 2019-01-04 DIAGNOSIS — I69.351 HEMIPLEGIA AND HEMIPARESIS FOLLOWING CEREBRAL INFARCTION AFFECTING RIGHT DOMINANT SIDE: Chronic | ICD-10-CM

## 2019-01-04 DIAGNOSIS — N18.30 CKD STAGE 3 SECONDARY TO DIABETES: ICD-10-CM

## 2019-01-04 DIAGNOSIS — E11.9 DM TYPE 2 WITHOUT RETINOPATHY: ICD-10-CM

## 2019-01-04 DIAGNOSIS — E78.5 HYPERLIPIDEMIA, UNSPECIFIED HYPERLIPIDEMIA TYPE: ICD-10-CM

## 2019-01-04 DIAGNOSIS — E11.9 DM TYPE 2 WITHOUT RETINOPATHY: Primary | ICD-10-CM

## 2019-01-04 DIAGNOSIS — I10 BENIGN ESSENTIAL HTN: Chronic | ICD-10-CM

## 2019-01-04 DIAGNOSIS — R60.0 PERIPHERAL EDEMA: ICD-10-CM

## 2019-01-04 DIAGNOSIS — G40.909 SEIZURE DISORDER AS SEQUELA OF CEREBROVASCULAR ACCIDENT: Chronic | ICD-10-CM

## 2019-01-04 DIAGNOSIS — N18.30 CONTROLLED TYPE 2 DIABETES MELLITUS WITH STAGE 3 CHRONIC KIDNEY DISEASE, WITHOUT LONG-TERM CURRENT USE OF INSULIN: ICD-10-CM

## 2019-01-04 DIAGNOSIS — N25.81 SECONDARY HYPERPARATHYROIDISM OF RENAL ORIGIN: ICD-10-CM

## 2019-01-04 DIAGNOSIS — R35.89 POLYURIA: ICD-10-CM

## 2019-01-04 DIAGNOSIS — Z23 NEED FOR INFLUENZA VACCINATION: ICD-10-CM

## 2019-01-04 LAB
25(OH)D3+25(OH)D2 SERPL-MCNC: 9 NG/ML
ALBUMIN SERPL BCP-MCNC: 2.9 G/DL
ALP SERPL-CCNC: 78 U/L
ALT SERPL W/O P-5'-P-CCNC: 14 U/L
ANION GAP SERPL CALC-SCNC: 5 MMOL/L
AST SERPL-CCNC: 14 U/L
BASOPHILS # BLD AUTO: 0.02 K/UL
BASOPHILS NFR BLD: 0.5 %
BILIRUB SERPL-MCNC: 0.2 MG/DL
BUN SERPL-MCNC: 36 MG/DL
CALCIUM SERPL-MCNC: 8.7 MG/DL
CHLORIDE SERPL-SCNC: 110 MMOL/L
CO2 SERPL-SCNC: 24 MMOL/L
CREAT SERPL-MCNC: 2.3 MG/DL
DIFFERENTIAL METHOD: ABNORMAL
EOSINOPHIL # BLD AUTO: 0 K/UL
EOSINOPHIL NFR BLD: 0.7 %
ERYTHROCYTE [DISTWIDTH] IN BLOOD BY AUTOMATED COUNT: 13.7 %
EST. GFR  (AFRICAN AMERICAN): 33.4 ML/MIN/1.73 M^2
EST. GFR  (NON AFRICAN AMERICAN): 28.9 ML/MIN/1.73 M^2
ESTIMATED AVG GLUCOSE: 134 MG/DL
GLUCOSE SERPL-MCNC: 126 MG/DL
HBA1C MFR BLD HPLC: 6.3 %
HCT VFR BLD AUTO: 39.2 %
HGB BLD-MCNC: 12.7 G/DL
IMM GRANULOCYTES # BLD AUTO: 0.01 K/UL
IMM GRANULOCYTES NFR BLD AUTO: 0.2 %
LYMPHOCYTES # BLD AUTO: 0.8 K/UL
LYMPHOCYTES NFR BLD: 19.7 %
MCH RBC QN AUTO: 24.9 PG
MCHC RBC AUTO-ENTMCNC: 32.4 G/DL
MCV RBC AUTO: 77 FL
MONOCYTES # BLD AUTO: 0.4 K/UL
MONOCYTES NFR BLD: 8.2 %
NEUTROPHILS # BLD AUTO: 3 K/UL
NEUTROPHILS NFR BLD: 70.7 %
NRBC BLD-RTO: 0 /100 WBC
PLATELET # BLD AUTO: 164 K/UL
PMV BLD AUTO: 10.9 FL
POTASSIUM SERPL-SCNC: 5.1 MMOL/L
PROT SERPL-MCNC: 6.4 G/DL
RBC # BLD AUTO: 5.1 M/UL
SODIUM SERPL-SCNC: 139 MMOL/L
WBC # BLD AUTO: 4.26 K/UL

## 2019-01-04 PROCEDURE — 90686 IIV4 VACC NO PRSV 0.5 ML IM: CPT | Mod: PBBFAC,PO

## 2019-01-04 PROCEDURE — 36415 COLL VENOUS BLD VENIPUNCTURE: CPT | Mod: PO

## 2019-01-04 PROCEDURE — 85025 COMPLETE CBC W/AUTO DIFF WBC: CPT

## 2019-01-04 PROCEDURE — 99214 OFFICE O/P EST MOD 30 MIN: CPT | Mod: PBBFAC,PO,25 | Performed by: INTERNAL MEDICINE

## 2019-01-04 PROCEDURE — 99214 PR OFFICE/OUTPT VISIT, EST, LEVL IV, 30-39 MIN: ICD-10-PCS | Mod: S$PBB,,, | Performed by: INTERNAL MEDICINE

## 2019-01-04 PROCEDURE — 99214 OFFICE O/P EST MOD 30 MIN: CPT | Mod: S$PBB,,, | Performed by: INTERNAL MEDICINE

## 2019-01-04 PROCEDURE — 83036 HEMOGLOBIN GLYCOSYLATED A1C: CPT

## 2019-01-04 PROCEDURE — 99999 PR PBB SHADOW E&M-EST. PATIENT-LVL IV: CPT | Mod: PBBFAC,,, | Performed by: INTERNAL MEDICINE

## 2019-01-04 PROCEDURE — 82306 VITAMIN D 25 HYDROXY: CPT

## 2019-01-04 PROCEDURE — 80053 COMPREHEN METABOLIC PANEL: CPT

## 2019-01-04 PROCEDURE — 99999 PR PBB SHADOW E&M-EST. PATIENT-LVL IV: ICD-10-PCS | Mod: PBBFAC,,, | Performed by: INTERNAL MEDICINE

## 2019-01-04 RX ORDER — PHENYTOIN SODIUM 100 MG/1
100 CAPSULE, EXTENDED RELEASE ORAL 3 TIMES DAILY
Qty: 270 CAPSULE | Refills: 3 | Status: SHIPPED | OUTPATIENT
Start: 2019-01-04 | End: 2020-04-05

## 2019-01-04 RX ORDER — FUROSEMIDE 20 MG/1
20 TABLET ORAL 2 TIMES DAILY
Qty: 90 TABLET | Refills: 3 | Status: SHIPPED | OUTPATIENT
Start: 2019-01-04 | End: 2020-05-12

## 2019-01-04 RX ORDER — ATORVASTATIN CALCIUM 80 MG/1
80 TABLET, FILM COATED ORAL DAILY
Qty: 90 TABLET | Refills: 3 | Status: SHIPPED | OUTPATIENT
Start: 2019-01-04 | End: 2020-01-17 | Stop reason: SDUPTHER

## 2019-01-04 RX ORDER — AMLODIPINE BESYLATE 5 MG/1
5 TABLET ORAL DAILY
Qty: 90 TABLET | Refills: 3 | Status: SHIPPED | OUTPATIENT
Start: 2019-01-04 | End: 2020-01-17

## 2019-01-04 RX ORDER — BACLOFEN 10 MG/1
10 TABLET ORAL 4 TIMES DAILY
Qty: 120 TABLET | Refills: 5 | Status: SHIPPED | OUTPATIENT
Start: 2019-01-04 | End: 2019-12-19

## 2019-01-04 RX ORDER — INSULIN ASPART 100 [IU]/ML
7 INJECTION, SOLUTION INTRAVENOUS; SUBCUTANEOUS
Qty: 5 BOX | Refills: 11 | Status: SHIPPED | OUTPATIENT
Start: 2019-01-04 | End: 2019-09-09 | Stop reason: SDUPTHER

## 2019-01-04 RX ORDER — CLOPIDOGREL BISULFATE 75 MG/1
75 TABLET ORAL DAILY
Qty: 90 TABLET | Refills: 3 | Status: SHIPPED | OUTPATIENT
Start: 2019-01-04 | End: 2020-04-05

## 2019-01-04 RX ORDER — VIT C/E/ZN/COPPR/LUTEIN/ZEAXAN 250MG-90MG
1000 CAPSULE ORAL DAILY
Qty: 90 CAPSULE | Refills: 3 | Status: SHIPPED | OUTPATIENT
Start: 2019-01-04 | End: 2021-12-16 | Stop reason: SDUPTHER

## 2019-01-04 RX ORDER — TAMSULOSIN HYDROCHLORIDE 0.4 MG/1
2 CAPSULE ORAL DAILY
Qty: 180 CAPSULE | Refills: 3 | Status: SHIPPED | OUTPATIENT
Start: 2019-01-04 | End: 2020-04-05

## 2019-01-06 DIAGNOSIS — D50.9 MICROCYTIC ANEMIA: Primary | ICD-10-CM

## 2019-01-06 DIAGNOSIS — N18.4 CKD (CHRONIC KIDNEY DISEASE) STAGE 4, GFR 15-29 ML/MIN: Primary | ICD-10-CM

## 2019-01-06 NOTE — PROGRESS NOTES
"CKD stage 4 with proteinuria. Not on ACEI or ARB b/c of hyperkalemia. Last renal US without hydronephrosis, though at that time he had a sinclair in him.  Vit D low. At last OV I had sent in daily vit D3 1000 IU  a1c good. K WNL. Takes lasix more as needed.  Microcytic anemia stable. Has not done colon CA screening. No iron profile.  Suspect due to CKD but would need to check other causes.    Please call pt - his kidney function is at a point where he's leaking protein out and I want him to see a nephrologist (kidney doctor). I will refer him.  We talked about taking vit D over the counter - he should do that.  He's anemic. This could be due to "chronic kidney disease" but I need to make sure it's not due to blood loss or the intestinal tract.  Very important that he do the home stool fitkit.  I believe we sent him home with it on his OV.  "

## 2019-01-08 ENCOUNTER — TELEPHONE (OUTPATIENT)
Dept: FAMILY MEDICINE | Facility: CLINIC | Age: 64
End: 2019-01-08

## 2019-01-08 DIAGNOSIS — I69.351 HEMIPLEGIA AND HEMIPARESIS FOLLOWING CEREBRAL INFARCTION AFFECTING RIGHT DOMINANT SIDE: Primary | Chronic | ICD-10-CM

## 2019-01-08 DIAGNOSIS — Z86.73 HISTORY OF STROKE: ICD-10-CM

## 2019-01-08 NOTE — TELEPHONE ENCOUNTER
----- Message from Lindy Marshall sent at 1/8/2019 10:20 AM CST -----  Contact: [facility]  Received referral but pt doesn't want home health pt wants outpatient services. Please call Gita  at [phone]

## 2019-01-08 NOTE — TELEPHONE ENCOUNTER
----- Message from Pamela Bangura sent at 1/8/2019  8:51 AM CST -----  Contact: Patience kirk/  [facility] [phone]  They received a order for  for pt, pt is declining HH but is welling to go to OT.Pls call Patience kirk/ [facility] [phone]. Thanks......Risa

## 2019-01-14 ENCOUNTER — TELEPHONE (OUTPATIENT)
Dept: FAMILY MEDICINE | Facility: CLINIC | Age: 64
End: 2019-01-14

## 2019-01-14 NOTE — TELEPHONE ENCOUNTER
"----- Message from Raciel Raymond MD sent at 1/6/2019  4:01 PM CST -----  CKD stage 4 with proteinuria. Not on ACEI or ARB b/c of hyperkalemia. Last renal US without hydronephrosis, though at that time he had a sinclair in him.  Vit D low. At last OV I had sent in daily vit D3 1000 IU  a1c good. K WNL. Takes lasix more as needed.  Microcytic anemia stable. Has not done colon CA screening. No iron profile.  Suspect due to CKD but would need to check other causes.    Please call pt - his kidney function is at a point where he's leaking protein out and I want him to see a nephrologist (kidney doctor). I will refer him.  We talked about taking vit D over the counter - he should do that.  He's anemic. This could be due to "chronic kidney disease" but I need to make sure it's not due to blood loss or the intestinal tract.  Very important that he do the home stool fitkit.  I believe we sent him home with it on his OV.    "

## 2019-01-24 ENCOUNTER — OFFICE VISIT (OUTPATIENT)
Dept: PODIATRY | Facility: CLINIC | Age: 64
End: 2019-01-24
Payer: MEDICARE

## 2019-01-24 VITALS — WEIGHT: 187 LBS | HEIGHT: 68 IN | BODY MASS INDEX: 28.34 KG/M2

## 2019-01-24 DIAGNOSIS — B35.1 ONYCHOMYCOSIS DUE TO DERMATOPHYTE: ICD-10-CM

## 2019-01-24 DIAGNOSIS — E11.49 TYPE II DIABETES MELLITUS WITH NEUROLOGICAL MANIFESTATIONS: Primary | ICD-10-CM

## 2019-01-24 PROCEDURE — 11721 ROUTINE FOOT CARE: ICD-10-PCS | Mod: S$PBB,Q9,, | Performed by: PODIATRIST

## 2019-01-24 PROCEDURE — 99213 OFFICE O/P EST LOW 20 MIN: CPT | Mod: PBBFAC,PO,25 | Performed by: PODIATRIST

## 2019-01-24 PROCEDURE — 99999 PR PBB SHADOW E&M-EST. PATIENT-LVL III: CPT | Mod: PBBFAC,,, | Performed by: PODIATRIST

## 2019-01-24 PROCEDURE — 99499 UNLISTED E&M SERVICE: CPT | Mod: S$PBB,,, | Performed by: PODIATRIST

## 2019-01-24 PROCEDURE — 99499 NO LOS: ICD-10-PCS | Mod: S$PBB,,, | Performed by: PODIATRIST

## 2019-01-24 PROCEDURE — 11721 DEBRIDE NAIL 6 OR MORE: CPT | Mod: PBBFAC,PO | Performed by: PODIATRIST

## 2019-01-24 PROCEDURE — 99999 PR PBB SHADOW E&M-EST. PATIENT-LVL III: ICD-10-PCS | Mod: PBBFAC,,, | Performed by: PODIATRIST

## 2019-01-24 NOTE — PROCEDURES
Routine Foot Care  Date/Time: 1/24/2019 10:16 AM  Performed by: Sabi Douglas DPM  Authorized by: Sabi Douglas DPM     Hyperkeratotic Skin Lesions?: No      Nail Care Type:  Debride  Location(s): All  (Left 1st Toe, Left 3rd Toe, Left 2nd Toe, Left 4th Toe, Left 5th Toe, Right 1st Toe, Right 2nd Toe, Right 3rd Toe, Right 4th Toe and Right 5th Toe)  Patient tolerance:  Patient tolerated the procedure well with no immediate complications

## 2019-01-24 NOTE — PROGRESS NOTES
Subjective:      Patient ID: Miguel Moore is a 63 y.o. male.    Chief Complaint: Diabetes Mellitus (PCP Dr Raymond); Diabetic Foot Exam; and Nail Care    Miguel is a 63 y.o. male who presents to the clinic for evaluation and treatment of high risk feet. 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 The patient's chief complaint is long, thick toenails on both feet and calluses on toes of left foot. Routine trimming of these help keep symptoms under control. This patient has documented high risk feet requiring routine maintenance secondary to diabetes mellitis and those secondary complications of diabetes, as mentioned. No other major complaints today. Has hx of stroke and right sided weakness.       PCP: Raciel Raymond MD    Date Last Seen by PCP:   Chief Complaint   Patient presents with    Diabetes Mellitus     PCP Dr Raymond    Diabetic Foot Exam    Nail Care         Current shoe gear:   Tennis shoes         Hemoglobin A1C   Date Value Ref Range Status   01/04/2019 6.3 (H) 4.0 - 5.6 % Final     Comment:     ADA Screening Guidelines:  5.7-6.4%  Consistent with prediabetes  >or=6.5%  Consistent with diabetes  High levels of fetal hemoglobin interfere with the HbA1C  assay. Heterozygous hemoglobin variants (HbS, HgC, etc)do  not significantly interfere with this assay.   However, presence of multiple variants may affect accuracy.     07/25/2018 6.0 (H) 4.0 - 5.6 % Final     Comment:     ADA Screening Guidelines:  5.7-6.4%  Consistent with prediabetes  >or=6.5%  Consistent with diabetes  High levels of fetal hemoglobin interfere with the HbA1C  assay. Heterozygous hemoglobin variants (HbS, HgC, etc)do  not significantly interfere with this assay.   However, presence of multiple variants may affect accuracy.     05/11/2018 6.2 (H) 4.0 - 5.6 % Final     Comment:     According to ADA guidelines, hemoglobin  A1c <7.0% represents  optimal control in non-pregnant diabetic patients. Different  metrics may apply to specific patient populations.   Standards of Medical Care in Diabetes-2016.  For the purpose of screening for the presence of diabetes:  <5.7%     Consistent with the absence of diabetes  5.7-6.4%  Consistent with increasing risk for diabetes   (prediabetes)  >or=6.5%  Consistent with diabetes  Currently, no consensus exists for use of hemoglobin A1c  for diagnosis of diabetes for children.  This Hemoglobin A1c assay has significant interference with fetal   hemoglobin   (HbF). The results are invalid for patients with abnormal amounts of   HbF,   including those with known Hereditary Persistence   of Fetal Hemoglobin. Heterozygous hemoglobin variants (HbAS, HbAC,   HbAD, HbAE, HbA2) do not significantly interfere with this assay;   however, presence of multiple variants in a sample may impact the %   interference.       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 Tay    CATARACT EXTRACTION W/  INTRAOCULAR LENS IMPLANT Left 10/19/2017    Dr. Tay    FEMORAL ARTERY STENT      INSERTION-INTRAOCULAR LENS (IOL) Left 10/19/2017    [facility]    INSERTION-INTRAOCULAR LENS (IOL) Right 10/5/2017    [facility]    KNEE SURGERY      bilateral scope    PHACOEMULSIFICATION-ASPIRATION-CATARACT Left 10/19/2017    [facility]    PHACOEMULSIFICATION-ASPIRATION-CATARACT Right 10/5/2017    [facility]       Family History   Problem Relation Age of Onset    Diabetes Mother     Heart disease Mother      Hypertension Mother     Cataracts Mother     No Known Problems Father     Hypertension Sister     No Known Problems Brother     No Known Problems Daughter     No Known Problems Maternal Aunt     No Known Problems Maternal Uncle     No Known Problems Paternal Aunt     No Known Problems Paternal Uncle     No Known Problems Maternal Grandmother     No Known Problems Maternal Grandfather     No Known Problems Paternal Grandmother     No Known Problems Paternal Grandfather     Amblyopia Neg Hx     Blindness Neg Hx     Cancer Neg Hx     Glaucoma Neg Hx     Macular degeneration Neg Hx     Retinal detachment Neg Hx     Strabismus Neg Hx     Stroke Neg Hx     Thyroid disease Neg Hx        Social History     Socioeconomic History    Marital status: Single     Spouse name: None    Number of children: None    Years of education: None    Highest education level: None   Social Needs    Financial resource strain: None    Food insecurity - worry: None    Food insecurity - inability: None    Transportation needs - medical: None    Transportation needs - non-medical: None   Occupational History    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, etc   Tobacco Use    Smoking status: Never Smoker    Smokeless tobacco: Never Used   Substance and Sexual Activity    Alcohol use: No    Drug use: No    Sexual activity: None   Other Topics Concern    None   Social History Narrative    None       Current Outpatient Medications   Medication Sig Dispense Refill    amLODIPine (NORVASC) 5 MG tablet Take 1 tablet (5 mg total) by mouth once daily. 90 tablet 3    atorvastatin (LIPITOR) 80 MG tablet Take 1 tablet (80 mg total) by mouth once daily. 90 tablet 3    baclofen (LIORESAL) 10 MG tablet Take 1 tablet (10 mg total) by mouth 4 (four) times daily. 120 tablet 5    blood sugar diagnostic Strp ONCE DAILY BLOOD SUGAR TESTING; [employer] BRAND COVERED BY INSURANCE 30 strip 11    blood-glucose meter  "Misc 1 each by Misc.(Non-Drug; Combo Route) route once daily. Pharmacy-whichever brand specified by insurance 1 each 0    cholecalciferol, vitamin D3, (VITAMIN D3) 1,000 unit capsule Take 1 capsule (1,000 Units total) by mouth once daily. 90 capsule 3    clopidogrel (PLAVIX) 75 mg tablet Take 1 tablet (75 mg total) by mouth once daily. 90 tablet 3    furosemide (LASIX) 20 MG tablet Take 1 tablet (20 mg total) by mouth 2 (two) times daily. 90 tablet 3    insulin aspart U-100 (NOVOLOG) 100 unit/mL InPn pen Inject 7 Units into the skin 3 (three) times daily with meals. 5 Box 11    pen needle, diabetic 31 gauge x 1/4" Ndle For mealtime insulin 300 each 11    phenytoin (DILANTIN) 100 MG ER capsule Take 1 capsule (100 mg total) by mouth 3 (three) times daily. 270 capsule 3    tamsulosin (FLOMAX) 0.4 mg Cap Take 2 capsules (0.8 mg total) by mouth once daily. 180 capsule 3    lancets (ONETOUCH DELICA LANCETS) 33 gauge Misc 1 lancet by Misc.(Non-Drug; Combo Route) route once daily. ONCE DAILY BLOOD SUGAR TESTING; [employer] COVERED BY INSURANCE 30 each 11     No current facility-administered medications for this visit.        Review of patient's allergies indicates:   Allergen Reactions    Penicillins Hives       Review of Systems   Constitution: Negative for chills and fever.   Cardiovascular: Positive for leg swelling. Negative for chest pain and claudication.   Respiratory: Negative for cough and shortness of breath.    Skin: Positive for dry skin and suspicious lesions (corns/callus).   Musculoskeletal: Positive for muscle weakness.   Gastrointestinal: Negative for nausea and vomiting.   Neurological: Positive for focal weakness (right sided), numbness and sensory change. Negative for paresthesias.   Psychiatric/Behavioral: Negative for altered mental status.           Objective:      Physical Exam   Constitutional: He is oriented to person, place, and time. He appears well-developed and well-nourished. "   HENT:   Head: Normocephalic.   Cardiovascular: Intact distal pulses.   Pulses:       Dorsalis pedis pulses are 2+ on the right side, and 2+ on the left side.        Posterior tibial pulses are 1+ on the right side, and 1+ on the left side.   CRT < 3 sec to tips of toes. 2+ pitting edema noted to b/l LE. No vericosities noted to b/l LEs.      Pulmonary/Chest: No respiratory distress.   Musculoskeletal:   Gastrocnemius equinus noted to b/l ankles with decreased DF noted on exam. MMT 5/5 in DF/PF/Inv/Ev resistance with no reproduction of pain in any direction Right, 3/5 left. Passive range of motion of ankle and pedal joints is painless. Adequate pedal joint ROM.     Rigid hammertoe contractures noted to toes 2-4 R-asymptomatic.     Limited hallux MTPJ ROM with plantarflexion contracture of b/l hallux IPJ, rigid R semi-reducible L.    Neurological: He is alert and oriented to person, place, and time. He has normal strength. A sensory deficit is present.   Light touch, proprioception, and sharp/dull sensation are all intact bilaterally. Protective threshold with the [place]-Wienstein monofilament is intact bilaterally. Vibratory sensation diminished b/l distal foot.      Skin: Skin is warm, dry and intact. Lesion noted. No ecchymosis noted. No erythema.   No open lesions, lacerations or wounds noted. Nails are dystrophic, elongated, thickened with yellow/brown discoloration to R 1 and L 1-5. Remaining toenails normotrophic.  Interdigital spaces clean, dry and intact b/l. No erythema noted to b/l foot. Skin texture thin, shiny, atrophic. Pedal hair absent. Toes cool to touch b/l.        Psychiatric: He has a normal mood and affect. His behavior is normal. Judgment and thought content normal.   Vitals reviewed.            Assessment:       Encounter Diagnoses   Name Primary?    Type II diabetes mellitus with neurological manifestations Yes    Onychomycosis due to dermatophyte          Plan:       Miguel was seen today  for diabetes mellitus, diabetic foot exam and nail care.    Diagnoses and all orders for this visit:    Type II diabetes mellitus with neurological manifestations    Onychomycosis due to dermatophyte      I counseled the patient on his conditions, their implications and medical management.    - Shoe inspection. Diabetic Foot Education. Patient reminded of the importance of good nutrition and blood sugar control to help prevent podiatric complications of diabetes. Patient instructed on proper foot hygeine. We discussed wearing proper shoe gear, daily foot inspections, never walking without protective shoe gear, never putting sharp instruments to feet, routine podiatric nail visits every 2-3 months.      See routine foot care procedure note for nail debridement     Discussed regular and routine moisturizer to skin of both feet to help improve dry skin. Advised to apply twice daily until resolution of symptoms. Avoid between toes. Recommended Gold Bond Foot cream or Diabetic cream.     Long discussion with patient regarding appropriate, supportive and comfortable shoes. Recommended supportive athletic style shoes with adequate arch supports to alleviate abnormal pressure and improve stability of foot while walking. Avoid flat shoes and barefoot walking as these will exacerbate or worsen symptoms.     RTC 3 months     Sabi Douglas DPM

## 2019-02-18 ENCOUNTER — DOCUMENTATION ONLY (OUTPATIENT)
Dept: REHABILITATION | Facility: HOSPITAL | Age: 64
End: 2019-02-18

## 2019-02-18 NOTE — PROGRESS NOTES
Physical Therapy    Patient did not show up for his scheduled PT evaluation today. This is the first no-show.

## 2019-03-04 ENCOUNTER — TELEPHONE (OUTPATIENT)
Dept: OPHTHALMOLOGY | Facility: CLINIC | Age: 64
End: 2019-03-04

## 2019-03-04 NOTE — TELEPHONE ENCOUNTER
----- Message from Blessing Haynes sent at 3/4/2019 10:24 AM CST -----  Contact: Pt  Pt would like to reschedule his appt today at the [facility] location, I am unable to schedule that appt    Pt# [phone]    Thanks

## 2019-03-13 ENCOUNTER — TELEPHONE (OUTPATIENT)
Dept: FAMILY MEDICINE | Facility: CLINIC | Age: 64
End: 2019-03-13

## 2019-03-13 DIAGNOSIS — I69.351 HEMIPLEGIA AND HEMIPARESIS FOLLOWING CEREBRAL INFARCTION AFFECTING RIGHT DOMINANT SIDE: Primary | Chronic | ICD-10-CM

## 2019-03-13 NOTE — TELEPHONE ENCOUNTER
----- Message from Tiffany Figueroa sent at 3/12/2019  1:58 PM CDT -----  Contact: self   Patient is asking if a order can be sent to [facility] for a new bedside commode. Please call at [phone].

## 2019-03-15 ENCOUNTER — TELEPHONE (OUTPATIENT)
Dept: FAMILY MEDICINE | Facility: CLINIC | Age: 64
End: 2019-03-15

## 2019-03-15 DIAGNOSIS — R26.81 UNSTABLE GAIT: Primary | ICD-10-CM

## 2019-03-22 ENCOUNTER — TELEPHONE (OUTPATIENT)
Dept: FAMILY MEDICINE | Facility: CLINIC | Age: 64
End: 2019-03-22

## 2019-03-22 DIAGNOSIS — I69.351 HEMIPLEGIA AND HEMIPARESIS FOLLOWING CEREBRAL INFARCTION AFFECTING RIGHT DOMINANT SIDE: Chronic | ICD-10-CM

## 2019-03-22 DIAGNOSIS — Z86.73 HISTORY OF STROKE: Primary | ICD-10-CM

## 2019-03-22 NOTE — TELEPHONE ENCOUNTER
----- Message from Bonifacio Gallego sent at 3/21/2019  3:44 PM CDT -----  Contact: Self/[phone]  The patient would like an order submitted for a bed side commode.          Thank you

## 2019-03-26 ENCOUNTER — TELEPHONE (OUTPATIENT)
Dept: FAMILY MEDICINE | Facility: CLINIC | Age: 64
End: 2019-03-26

## 2019-03-26 NOTE — TELEPHONE ENCOUNTER
----- Message from Bonifacio Gallego sent at 3/26/2019  2:09 PM CDT -----  Contact: Self/[phone]  The patient would like to speak to the staff regarding his bed side commode. He's stating DME never received the order.            Thank you

## 2019-03-26 NOTE — TELEPHONE ENCOUNTER
----- Message from Padma Vera sent at 3/26/2019  2:11 PM CDT -----  Contact: [facility]  [place] requesting for order to be refaxed. Fax # [phone] or [phone]

## 2019-03-28 ENCOUNTER — TELEPHONE (OUTPATIENT)
Dept: FAMILY MEDICINE | Facility: CLINIC | Age: 64
End: 2019-03-28

## 2019-03-28 NOTE — TELEPHONE ENCOUNTER
----- Message from Padma Vera sent at 3/28/2019 12:53 PM CDT -----  [facility] pt clinical notes for bed side commode. Fax # [phone] Phone # [phone].

## 2019-04-01 ENCOUNTER — TELEPHONE (OUTPATIENT)
Dept: FAMILY MEDICINE | Facility: CLINIC | Age: 64
End: 2019-04-01

## 2019-04-01 NOTE — TELEPHONE ENCOUNTER
----- Message from Lucinda Gallegos sent at 4/1/2019  9:32 AM CDT -----  Contact: Self  Patient called because he received a call from [employer] stating that they didn't receive the orders for his bedside commode. Please call to advise at [phone].

## 2019-04-24 ENCOUNTER — HOSPITAL ENCOUNTER (OUTPATIENT)
Dept: RADIOLOGY | Facility: HOSPITAL | Age: 64
Discharge: HOME | End: 2019-04-24
Attending: PODIATRIST
Payer: MEDICARE

## 2019-04-24 ENCOUNTER — OFFICE VISIT (OUTPATIENT)
Dept: PODIATRY | Facility: CLINIC | Age: 64
End: 2019-04-24
Payer: MEDICARE

## 2019-04-24 VITALS — WEIGHT: 187 LBS | HEIGHT: 68 IN | BODY MASS INDEX: 28.34 KG/M2

## 2019-04-24 DIAGNOSIS — L97.522 FOOT ULCERATION, LEFT, WITH FAT LAYER EXPOSED: Primary | ICD-10-CM

## 2019-04-24 DIAGNOSIS — L97.522 FOOT ULCERATION, LEFT, WITH FAT LAYER EXPOSED: ICD-10-CM

## 2019-04-24 DIAGNOSIS — E11.49 TYPE II DIABETES MELLITUS WITH NEUROLOGICAL MANIFESTATIONS: ICD-10-CM

## 2019-04-24 DIAGNOSIS — B35.1 ONYCHOMYCOSIS DUE TO DERMATOPHYTE: ICD-10-CM

## 2019-04-24 PROCEDURE — 87070 CULTURE OTHR SPECIMN AEROBIC: CPT

## 2019-04-24 PROCEDURE — 87077 CULTURE AEROBIC IDENTIFY: CPT

## 2019-04-24 PROCEDURE — 87075 CULTR BACTERIA EXCEPT BLOOD: CPT

## 2019-04-24 PROCEDURE — 99213 OFFICE O/P EST LOW 20 MIN: CPT | Mod: PBBFAC,25,PO | Performed by: PODIATRIST

## 2019-04-24 PROCEDURE — 73630 XR FOOT COMPLETE 3 VIEW LEFT: ICD-10-PCS | Mod: 26,LT,, | Performed by: RADIOLOGY

## 2019-04-24 PROCEDURE — 99213 OFFICE O/P EST LOW 20 MIN: CPT | Mod: S$PBB,,, | Performed by: PODIATRIST

## 2019-04-24 PROCEDURE — 87186 SC STD MICRODIL/AGAR DIL: CPT

## 2019-04-24 PROCEDURE — 99999 PR PBB SHADOW E&M-EST. PATIENT-LVL III: CPT | Mod: PBBFAC,,, | Performed by: PODIATRIST

## 2019-04-24 PROCEDURE — 99213 PR OFFICE/OUTPT VISIT, EST, LEVL III, 20-29 MIN: ICD-10-PCS | Mod: S$PBB,,, | Performed by: PODIATRIST

## 2019-04-24 PROCEDURE — 99999 PR PBB SHADOW E&M-EST. PATIENT-LVL III: ICD-10-PCS | Mod: PBBFAC,,, | Performed by: PODIATRIST

## 2019-04-24 PROCEDURE — 73630 X-RAY EXAM OF FOOT: CPT | Mod: TC,FY,PO,LT

## 2019-04-24 PROCEDURE — 73630 X-RAY EXAM OF FOOT: CPT | Mod: 26,LT,, | Performed by: RADIOLOGY

## 2019-04-28 LAB
BACTERIA SPEC AEROBE CULT: NORMAL

## 2019-04-29 LAB — BACTERIA SPEC ANAEROBE CULT: NORMAL

## 2019-04-29 NOTE — PROGRESS NOTES
Subjective:      Patient ID: Miguel Moore is a 63 y.o. male.    Chief Complaint: Diabetes Mellitus (Left foot between 4th and 5th digit pain (PCP Dr Raymond 1/4/19)); Diabetic Foot Exam; and Nail Care    Miguel is a 63 y.o. male who presents to the clinic for evaluation and treatment of high risk feet. 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 The patient's chief complaint is long, thick toenails on both feet and calluses on toes of left foot. Routine trimming of these help keep symptoms under control. This patient has documented high risk feet requiring routine maintenance secondary to diabetes mellitis and those secondary complications of diabetes, as mentioned. No other major complaints today. Has hx of stroke and right sided weakness.   New onset left 4th lateral toe ulceration does not know how this started. Thinks this started one week ago.       PCP: Raciel Raymond MD    Date Last Seen by PCP:   Chief Complaint   Patient presents with    Diabetes Mellitus     Left foot between 4th and 5th digit pain (PCP Dr Raymond 1/4/19)    Diabetic Foot Exam    Nail Care         Current shoe gear:   Tennis shoes         Hemoglobin A1C   Date Value Ref Range Status   01/04/2019 6.3 (H) 4.0 - 5.6 % Final     Comment:     ADA Screening Guidelines:  5.7-6.4%  Consistent with prediabetes  >or=6.5%  Consistent with diabetes  High levels of fetal hemoglobin interfere with the HbA1C  assay. Heterozygous hemoglobin variants (HbS, HgC, etc)do  not significantly interfere with this assay.   However, presence of multiple variants may affect accuracy.     07/25/2018 6.0 (H) 4.0 - 5.6 % Final     Comment:     ADA Screening Guidelines:  5.7-6.4%  Consistent with prediabetes  >or=6.5%  Consistent with diabetes  High levels of fetal hemoglobin interfere with the HbA1C  assay. Heterozygous hemoglobin variants (HbS, HgC,  etc)do  not significantly interfere with this assay.   However, presence of multiple variants may affect accuracy.     05/11/2018 6.2 (H) 4.0 - 5.6 % Final     Comment:     According to ADA guidelines, hemoglobin A1c <7.0% represents  optimal control in non-pregnant diabetic patients. Different  metrics may apply to specific patient populations.   Standards of Medical Care in Diabetes-2016.  For the purpose of screening for the presence of diabetes:  <5.7%     Consistent with the absence of diabetes  5.7-6.4%  Consistent with increasing risk for diabetes   (prediabetes)  >or=6.5%  Consistent with diabetes  Currently, no consensus exists for use of hemoglobin A1c  for diagnosis of diabetes for children.  This Hemoglobin A1c assay has significant interference with fetal   hemoglobin   (HbF). The results are invalid for patients with abnormal amounts of   HbF,   including those with known Hereditary Persistence   of Fetal Hemoglobin. Heterozygous hemoglobin variants (HbAS, HbAC,   HbAD, HbAE, HbA2) do not significantly interfere with this assay;   however, presence of multiple variants in a sample may impact the %   interference.       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 Tay    CATARACT EXTRACTION W/  INTRAOCULAR LENS IMPLANT Left 10/19/2017    Dr. Tay    FEMORAL ARTERY STENT      INSERTION-INTRAOCULAR LENS (IOL) Left 10/19/2017    [facility]    INSERTION-INTRAOCULAR LENS (IOL) Right 10/5/2017    [facility]    KNEE SURGERY      bilateral scope    PHACOEMULSIFICATION-ASPIRATION-CATARACT Left 10/19/2017    [facility]     PHACOEMULSIFICATION-ASPIRATION-CATARACT Right 10/5/2017    [facility]       Family History   Problem Relation Age of Onset    Diabetes Mother     Heart disease Mother     Hypertension Mother     Cataracts Mother     No Known Problems Father     Hypertension Sister     No Known Problems Brother     No Known Problems Daughter     No Known Problems Maternal Aunt     No Known Problems Maternal Uncle     No Known Problems Paternal Aunt     No Known Problems Paternal Uncle     No Known Problems Maternal Grandmother     No Known Problems Maternal Grandfather     No Known Problems Paternal Grandmother     No Known Problems Paternal Grandfather     Amblyopia Neg Hx     Blindness Neg Hx     Cancer Neg Hx     Glaucoma Neg Hx     Macular degeneration Neg Hx     Retinal detachment Neg Hx     Strabismus Neg Hx     Stroke Neg Hx     Thyroid disease Neg Hx        Social History     Socioeconomic History    Marital status: Single     Spouse name: Not on file    Number of children: Not on file    Years of education: Not on file    Highest education level: Not on file   Occupational History    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, etc   Social Needs    Financial resource strain: Not on file    Food insecurity:     Worry: Not on file     Inability: Not on file    Transportation needs:     Medical: Not on file     Non-medical: Not on file   Tobacco Use    Smoking status: Never Smoker    Smokeless tobacco: Never Used   Substance and Sexual Activity    Alcohol use: No    Drug use: No    Sexual activity: Not on file   Lifestyle    Physical activity:     Days per week: Not on file     Minutes per session: Not on file    Stress: Not on file   Relationships    Social connections:     Talks on phone: Not on file     Gets together: Not on file     Attends Orthodoxy service: Not on file     Active member of club or organization: Not on file      "Attends meetings of clubs or organizations: Not on file     Relationship status: Not on file   Other Topics Concern    Not on file   Social History Narrative    Not on file       Current Outpatient Medications   Medication Sig Dispense Refill    amLODIPine (NORVASC) 5 MG tablet Take 1 tablet (5 mg total) by mouth once daily. 90 tablet 3    atorvastatin (LIPITOR) 80 MG tablet Take 1 tablet (80 mg total) by mouth once daily. 90 tablet 3    baclofen (LIORESAL) 10 MG tablet Take 1 tablet (10 mg total) by mouth 4 (four) times daily. 120 tablet 5    blood sugar diagnostic Strp ONCE DAILY BLOOD SUGAR TESTING; [employer] BRAND COVERED BY INSURANCE 30 strip 11    blood-glucose meter Misc 1 each by Misc.(Non-Drug; Combo Route) route once daily. Pharmacy-whichever brand specified by insurance 1 each 0    cholecalciferol, vitamin D3, (VITAMIN D3) 1,000 unit capsule Take 1 capsule (1,000 Units total) by mouth once daily. 90 capsule 3    clopidogrel (PLAVIX) 75 mg tablet Take 1 tablet (75 mg total) by mouth once daily. 90 tablet 3    furosemide (LASIX) 20 MG tablet Take 1 tablet (20 mg total) by mouth 2 (two) times daily. 90 tablet 3    insulin aspart U-100 (NOVOLOG) 100 unit/mL InPn pen Inject 7 Units into the skin 3 (three) times daily with meals. 5 Box 11    pen needle, diabetic 31 gauge x 1/4" Ndle For mealtime insulin 300 each 11    phenytoin (DILANTIN) 100 MG ER capsule Take 1 capsule (100 mg total) by mouth 3 (three) times daily. 270 capsule 3    tamsulosin (FLOMAX) 0.4 mg Cap Take 2 capsules (0.8 mg total) by mouth once daily. 180 capsule 3    lancets (ONETOUCH DELICA LANCETS) 33 gauge Misc 1 lancet by Misc.(Non-Drug; Combo Route) route once daily. ONCE DAILY BLOOD SUGAR TESTING; [employer] BR[employer] COVERED BY INSURANCE 30 each 11     No current facility-administered medications for this visit.        Review of patient's allergies indicates:   Allergen Reactions    Penicillins Hives       Review of Systems "   Constitution: Negative for chills and fever.   Cardiovascular: Positive for leg swelling. Negative for chest pain and claudication.   Respiratory: Negative for cough and shortness of breath.    Skin: Positive for dry skin and suspicious lesions (corns/callus).   Musculoskeletal: Positive for muscle weakness.   Gastrointestinal: Negative for nausea and vomiting.   Neurological: Positive for focal weakness (right sided), numbness and sensory change. Negative for paresthesias.   Psychiatric/Behavioral: Negative for altered mental status.           Objective:      Physical Exam   Constitutional: He is oriented to person, place, and time. He appears well-developed and well-nourished.   HENT:   Head: Normocephalic.   Cardiovascular: Intact distal pulses.   Pulses:       Dorsalis pedis pulses are 2+ on the right side, and 2+ on the left side.        Posterior tibial pulses are 1+ on the right side, and 1+ on the left side.   CRT < 3 sec to tips of toes. 2+ pitting edema noted to b/l LE. No vericosities noted to b/l LEs.      Pulmonary/Chest: No respiratory distress.   Musculoskeletal:   Gastrocnemius equinus noted to b/l ankles with decreased DF noted on exam. MMT 5/5 in DF/PF/Inv/Ev resistance with no reproduction of pain in any direction Right, 3/5 left. Passive range of motion of ankle and pedal joints is painless. Adequate pedal joint ROM.     Rigid hammertoe contractures noted to toes 2-4 R-asymptomatic.     Limited hallux MTPJ ROM with plantarflexion contracture of b/l hallux IPJ, rigid R semi-reducible L.    Neurological: He is alert and oriented to person, place, and time. He has normal strength. A sensory deficit is present.   Light touch, proprioception, and sharp/dull sensation are all intact bilaterally. Protective threshold with the [place]ienstein monofilament is intact bilaterally. Vibratory sensation diminished b/l distal foot.      Skin: Skin is warm, dry and intact. Lesion noted. No ecchymosis noted.  No erythema.   No open lesions, lacerations or wounds noted. Nails are dystrophic, elongated, thickened with yellow/brown discoloration to R 1 and L 1-5. Remaining toenails normotrophic.  Interdigital spaces clean, dry and intact b/l. No erythema noted to b/l foot. Skin texture thin, shiny, atrophic. Pedal hair absent. Toes cool to touch b/l.        Psychiatric: He has a normal mood and affect. His behavior is normal. Judgment and thought content normal.   Vitals reviewed.    Wound 1:Left lateral 4th toe ulceration.   Measurement: 0.2cmx0.2cmx0.1cm  pre debridement 0.4cmx0.4cmx0.1cmpost debridement.  Base: fibrogranular base   Periwound skin: HPK  Drainage: none  Erythema: none  Probe: none, no bone exposed  T                      Assessment:       Encounter Diagnoses   Name Primary?    Foot ulceration, left, with fat layer exposed Yes    Type II diabetes mellitus with neurological manifestations     Onychomycosis due to dermatophyte          Plan:       Miguel was seen today for diabetes mellitus, diabetic foot exam and nail care.    Diagnoses and all orders for this visit:    Foot ulceration, left, with fat layer exposed  -     X-Ray Foot Complete Left; Future  -     Aerobic culture (Specify Source)  -     CULTURE, ANAEROBE    Type II diabetes mellitus with neurological manifestations    Onychomycosis due to dermatophyte      I counseled the patient on his conditions, their implications and medical management.    - Shoe inspection. Diabetic Foot Education. Patient reminded of the importance of good nutrition and blood sugar control to help prevent podiatric complications of diabetes. Patient instructed on proper foot hygeine. We discussed wearing proper shoe gear, daily foot inspections, never walking without protective shoe gear, never putting sharp instruments to feet, routine podiatric nail visits every 2-3 months.    Nails 1-5 B/L debrided without incident.     Discussed regular and routine moisturizer to  skin of both feet to help improve dry skin. Advised to apply twice daily until resolution of symptoms. Avoid between toes. Recommended [place] Foot cream or Diabetic cream.       Debridement:Procedure: After verbal consent, a sterile #15 blade was used to excisionally debride viable and non viable tissue on the left 4th lateral toe.  Tissue debrided through epidermis, dermis, and subcutaneous tissue. Tissue excised included epidermis, dermis, sucutaneous tissue, fibrin, biofilm, and callus.    Dressings: Iodosorb  Offloading:Hydroferal blue,     [employer] shoes dispensed.     Aerobic, anaerobic cultures obtained     Left foot xray to assess underlying deformity and for underlying osseous pathology.     Prescription for Augmentin sent to pharmacy.     Follow-up:Patient is to return to the clinic in one week for follow-up but should call [facility] immediately if any signs of infection, such as fever, chills, sweats, increased redness or pain.    Short-term goals include maintaining good offloading and minimizing bioburden, promoting granulation and epithelialization to healing.  Long-term goals include keeping the wound healed by good offloading and medical management under the direction of internist.      Sabi Douglas DPM

## 2019-04-30 DIAGNOSIS — Z12.11 ENCOUNTER FOR FIT (FECAL IMMUNOCHEMICAL TEST) SCREENING: Primary | ICD-10-CM

## 2019-05-01 ENCOUNTER — OFFICE VISIT (OUTPATIENT)
Dept: PODIATRY | Facility: CLINIC | Age: 64
End: 2019-05-01
Payer: MEDICARE

## 2019-05-01 VITALS — HEIGHT: 68 IN | WEIGHT: 187 LBS | BODY MASS INDEX: 28.34 KG/M2

## 2019-05-01 DIAGNOSIS — L97.522 FOOT ULCERATION, LEFT, WITH FAT LAYER EXPOSED: Primary | ICD-10-CM

## 2019-05-01 DIAGNOSIS — E11.49 TYPE II DIABETES MELLITUS WITH NEUROLOGICAL MANIFESTATIONS: ICD-10-CM

## 2019-05-01 PROCEDURE — 99213 OFFICE O/P EST LOW 20 MIN: CPT | Mod: PBBFAC,PO,25 | Performed by: PODIATRIST

## 2019-05-01 PROCEDURE — 99499 NO LOS: ICD-10-PCS | Mod: S$PBB,,, | Performed by: PODIATRIST

## 2019-05-01 PROCEDURE — 99999 PR PBB SHADOW E&M-EST. PATIENT-LVL III: ICD-10-PCS | Mod: PBBFAC,,, | Performed by: PODIATRIST

## 2019-05-01 PROCEDURE — 11042 WOUND DEBRIDEMENT: ICD-10-PCS | Mod: S$PBB,,, | Performed by: PODIATRIST

## 2019-05-01 PROCEDURE — 11042 DBRDMT SUBQ TIS 1ST 20SQCM/<: CPT | Mod: PBBFAC,PO | Performed by: PODIATRIST

## 2019-05-01 PROCEDURE — 99499 UNLISTED E&M SERVICE: CPT | Mod: S$PBB,,, | Performed by: PODIATRIST

## 2019-05-01 PROCEDURE — 99999 PR PBB SHADOW E&M-EST. PATIENT-LVL III: CPT | Mod: PBBFAC,,, | Performed by: PODIATRIST

## 2019-05-01 RX ORDER — CIPROFLOXACIN 500 MG/1
500 TABLET ORAL EVERY 12 HOURS
Qty: 20 TABLET | Refills: 0 | Status: SHIPPED | OUTPATIENT
Start: 2019-05-01 | End: 2019-09-09

## 2019-05-01 NOTE — PROGRESS NOTES
Subjective:      Patient ID: Miguel Moore is a 63 y.o. male.    Chief Complaint: Wound Care (left foot between 4th and 5th digit (PCP Dr Raymond 1/4/19))    Miguel is a 63 y.o. male who presents to the clinic for evaluation and treatment of high risk feet. 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 The patient's chief complaint is long, thick toenails on both feet and calluses on toes of left foot. Routine trimming of these help keep symptoms under control. This patient has documented high risk feet requiring routine maintenance secondary to diabetes mellitis and those secondary complications of diabetes, as mentioned. No other major complaints today. Has hx of stroke and right sided weakness.   New onset left 4th lateral toe ulceration does not know how this started. Thinks this started one week ago.     5/1/19: F/u left lateral 4th toe ulceration. Has been applying betadine and hydrofera blue. Presents in sandals today. States he wears the [employer] shoes previously dispensed at home and does not feel they are supportive to walk out with. H/o stroke.       PCP: Raciel Raymond MD    Date Last Seen by PCP:   Chief Complaint   Patient presents with    Wound Care     left foot between 4th and 5th digit (PCP Dr Raymond 1/4/19)         Current shoe gear:   Tennis shoes         Hemoglobin A1C   Date Value Ref Range Status   01/04/2019 6.3 (H) 4.0 - 5.6 % Final     Comment:     ADA Screening Guidelines:  5.7-6.4%  Consistent with prediabetes  >or=6.5%  Consistent with diabetes  High levels of fetal hemoglobin interfere with the HbA1C  assay. Heterozygous hemoglobin variants (HbS, HgC, etc)do  not significantly interfere with this assay.   However, presence of multiple variants may affect accuracy.     07/25/2018 6.0 (H) 4.0 - 5.6 % Final     Comment:     ADA Screening Guidelines:  5.7-6.4%  Consistent with  prediabetes  >or=6.5%  Consistent with diabetes  High levels of fetal hemoglobin interfere with the HbA1C  assay. Heterozygous hemoglobin variants (HbS, HgC, etc)do  not significantly interfere with this assay.   However, presence of multiple variants may affect accuracy.     05/11/2018 6.2 (H) 4.0 - 5.6 % Final     Comment:     According to ADA guidelines, hemoglobin A1c <7.0% represents  optimal control in non-pregnant diabetic patients. Different  metrics may apply to specific patient populations.   Standards of Medical Care in Diabetes-2016.  For the purpose of screening for the presence of diabetes:  <5.7%     Consistent with the absence of diabetes  5.7-6.4%  Consistent with increasing risk for diabetes   (prediabetes)  >or=6.5%  Consistent with diabetes  Currently, no consensus exists for use of hemoglobin A1c  for diagnosis of diabetes for children.  This Hemoglobin A1c assay has significant interference with fetal   hemoglobin   (HbF). The results are invalid for patients with abnormal amounts of   HbF,   including those with known Hereditary Persistence   of Fetal Hemoglobin. Heterozygous hemoglobin variants (HbAS, HbAC,   HbAD, HbAE, HbA2) do not significantly interfere with this assay;   however, presence of multiple variants in a sample may impact the %   interference.       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 Tay    CATARACT EXTRACTION W/  INTRAOCULAR LENS IMPLANT Left 10/19/2017    Dr. Tay    FEMORAL ARTERY STENT      INSERTION-INTRAOCULAR LENS (IOL) Left 10/19/2017    [facility]    INSERTION-INTRAOCULAR LENS (IOL) Right 10/5/2017    [facility]  OR    KNEE SURGERY      bilateral scope    PHACOEMULSIFICATION-ASPIRATION-CATARACT Left 10/19/2017    [facility] OR    PHACOEMULSIFICATION-ASPIRATION-CATARACT Right 10/5/2017    [facility] at [facility] OR       Family History   Problem Relation Age of Onset    Diabetes Mother     Heart disease Mother     Hypertension Mother     Cataracts Mother     No Known Problems Father     Hypertension Sister     No Known Problems Brother     No Known Problems Daughter     No Known Problems Maternal Aunt     No Known Problems Maternal Uncle     No Known Problems Paternal Aunt     No Known Problems Paternal Uncle     No Known Problems Maternal Grandmother     No Known Problems Maternal Grandfather     No Known Problems Paternal Grandmother     No Known Problems Paternal Grandfather     Amblyopia Neg Hx     Blindness Neg Hx     Cancer Neg Hx     Glaucoma Neg Hx     Macular degeneration Neg Hx     Retinal detachment Neg Hx     Strabismus Neg Hx     Stroke Neg Hx     Thyroid disease Neg Hx        Social History     Socioeconomic History    Marital status: Single     Spouse name: Not on file    Number of children: Not on file    Years of education: Not on file    Highest education level: Not on file   Occupational History    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, etc   Social Needs    Financial resource strain: Not on file    Food insecurity:     Worry: Not on file     Inability: Not on file    Transportation needs:     Medical: Not on file     Non-medical: Not on file   Tobacco Use    Smoking status: Never Smoker    Smokeless tobacco: Never Used   Substance and Sexual Activity    Alcohol use: No    Drug use: No    Sexual activity: Not on file   Lifestyle    Physical activity:     Days per week: Not on file     Minutes per session: Not on file    Stress: Not on file   Relationships    Social connections:     Talks  "on phone: Not on file     Gets together: Not on file     Attends [employer] service: Not on file     Active member of club or organization: Not on file     Attends meetings of clubs or organizations: Not on file     Relationship status: Not on file   Other Topics Concern    Not on file   Social History Narrative    Not on file       Current Outpatient Medications   Medication Sig Dispense Refill    amLODIPine (NORVASC) 5 MG tablet Take 1 tablet (5 mg total) by mouth once daily. 90 tablet 3    atorvastatin (LIPITOR) 80 MG tablet Take 1 tablet (80 mg total) by mouth once daily. 90 tablet 3    baclofen (LIORESAL) 10 MG tablet Take 1 tablet (10 mg total) by mouth 4 (four) times daily. 120 tablet 5    blood sugar diagnostic Strp ONCE DAILY BLOOD SUGAR TESTING; [employer] BRAND COVERED BY INSURANCE 30 strip 11    blood-glucose meter Misc 1 each by Misc.(Non-Drug; Combo Route) route once daily. Pharmacy-whichever brand specified by insurance 1 each 0    cholecalciferol, vitamin D3, (VITAMIN D3) 1,000 unit capsule Take 1 capsule (1,000 Units total) by mouth once daily. 90 capsule 3    clopidogrel (PLAVIX) 75 mg tablet Take 1 tablet (75 mg total) by mouth once daily. 90 tablet 3    furosemide (LASIX) 20 MG tablet Take 1 tablet (20 mg total) by mouth 2 (two) times daily. 90 tablet 3    insulin aspart U-100 (NOVOLOG) 100 unit/mL InPn pen Inject 7 Units into the skin 3 (three) times daily with meals. 5 Box 11    pen needle, diabetic 31 gauge x 1/4" Ndle For mealtime insulin 300 each 11    phenytoin (DILANTIN) 100 MG ER capsule Take 1 capsule (100 mg total) by mouth 3 (three) times daily. 270 capsule 3    tamsulosin (FLOMAX) 0.4 mg Cap Take 2 capsules (0.8 mg total) by mouth once daily. 180 capsule 3    ciprofloxacin HCl (CIPRO) 500 MG tablet Take 1 tablet (500 mg total) by mouth every 12 (twelve) hours. 20 tablet 0    lancets (ONETOUCH DELICA LANCETS) 33 gauge Misc 1 lancet by Misc.(Non-Drug; Combo Route) route " once daily. ONCE DAILY BLOOD SUGAR TESTING; [employer] BR[employer] COVERED BY INSURANCE 30 each 11     No current facility-administered medications for this visit.        Review of patient's allergies indicates:   Allergen Reactions    Penicillins Hives       Review of Systems   Constitution: Negative for chills and fever.   Cardiovascular: Positive for leg swelling. Negative for chest pain and claudication.   Respiratory: Negative for cough and shortness of breath.    Skin: Positive for dry skin and suspicious lesions (corns/callus).   Musculoskeletal: Positive for muscle weakness.   Gastrointestinal: Negative for nausea and vomiting.   Neurological: Positive for focal weakness (right sided), numbness and sensory change. Negative for paresthesias.   Psychiatric/Behavioral: Negative for altered mental status.           Objective:      Physical Exam   Constitutional: He is oriented to person, place, and time. He appears well-developed and well-nourished.   HENT:   Head: Normocephalic.   Cardiovascular: Intact distal pulses.   Pulses:       Dorsalis pedis pulses are 2+ on the right side, and 2+ on the left side.        Posterior tibial pulses are 1+ on the right side, and 1+ on the left side.   CRT < 3 sec to tips of toes. 2+ pitting edema noted to b/l LE. No vericosities noted to b/l LEs.      Pulmonary/Chest: No respiratory distress.   Musculoskeletal:   Gastrocnemius equinus noted to b/l ankles with decreased DF noted on exam. MMT 5/5 in DF/PF/Inv/Ev resistance with no reproduction of pain in any direction Right, 3/5 left. Passive range of motion of ankle and pedal joints is painless. Adequate pedal joint ROM.     Rigid hammertoe contractures noted to toes 2-4 R-asymptomatic.     Limited hallux MTPJ ROM with plantarflexion contracture of b/l hallux IPJ, rigid R semi-reducible L.    Neurological: He is alert and oriented to person, place, and time. He has normal strength. A sensory deficit is present.   Light touch,  proprioception, and sharp/dull sensation are all intact bilaterally. Protective threshold with the [place]-Wienstein monofilament is intact bilaterally. Vibratory sensation diminished b/l distal foot.      Skin: Skin is warm, dry and intact. Lesion noted. No ecchymosis noted. No erythema.   No open lesions, lacerations or wounds noted. Nails are dystrophic, elongated, thickened with yellow/brown discoloration to R 1 and L 1-5. Remaining toenails normotrophic.  Interdigital spaces clean, dry and intact b/l. No erythema noted to b/l foot. Skin texture thin, shiny, atrophic. Pedal hair absent. Toes cool to touch b/l.        Psychiatric: He has a normal mood and affect. His behavior is normal. Judgment and thought content normal.   Vitals reviewed.    Wound 1:Left lateral 4th toe ulceration.   Measurement: 0.2cmx0.2cmx0.1cm  pre debridement 0.3cmx0.3cmx0.1cmpost debridement.  Base: fibrogranular base   Periwound skin: HPK  Drainage: none  Erythema: none  Probe: none, no bone exposed    5/1/19 4/24/19                Assessment:       Encounter Diagnoses   Name Primary?    Foot ulceration, left, with fat layer exposed Yes    Type II diabetes mellitus with neurological manifestations          Plan:       Miguel was seen today for wound care.    Diagnoses and all orders for this visit:    Foot ulceration, left, with fat layer exposed    Type II diabetes mellitus with neurological manifestations    Other orders  -     ciprofloxacin HCl (CIPRO) 500 MG tablet; Take 1 tablet (500 mg total) by mouth every 12 (twelve) hours.      I counseled the patient on his conditions, their implications and medical management.    - Shoe inspection. Diabetic Foot Education. Patient reminded of the importance of good nutrition and blood sugar control to help prevent podiatric complications of diabetes. Patient instructed on proper foot hygeine. We discussed wearing proper shoe gear, daily foot inspections, never walking without  protective shoe gear, never putting sharp instruments to feet, routine podiatric nail visits every 2-3 months.      Discussed regular and routine moisturizer to skin of both feet to help improve dry skin. Advised to apply twice daily until resolution of symptoms. Avoid between toes. Recommended Gold Bond Foot cream or Diabetic cream.       Debridement:Procedure: After verbal consent, a sterile #15 blade was used to excisionally debride viable and non viable tissue on the left 4th lateral toe.  Tissue debrided through epidermis, dermis, and subcutaneous tissue. Tissue excised included epidermis, dermis, sucutaneous tissue, fibrin, biofilm, and callus.    Dressings: Iodosorb  Offloading:Hydroferal blue,     Instructed to continue to wear [employer] shoe.     Prescription for cipro sent to pharmacy.      Left foot xray to assess underlying deformity and for underlying osseous pathology.     Prescription for Augmentin sent to pharmacy.     Follow-up:Patient is to return to the clinic in one week for follow-up but should call [facility] immediately if any signs of infection, such as fever, chills, sweats, increased redness or pain.    Short-term goals include maintaining good offloading and minimizing bioburden, promoting granulation and epithelialization to healing.  Long-term goals include keeping the wound healed by good offloading and medical management under the direction of internist.      Sabi Douglas DPM

## 2019-05-01 NOTE — PROCEDURES
Wound Debridement  Date/Time: 5/1/2019 9:51 AM  Performed by: Sabi Douglas DPM  Authorized by: Sabi Douglas DPM     Consent Done?:  Yes (Verbal)    Wound Details:    Location:  Left foot    Location:  Left 4th Toe    Type of Debridement:  Excisional       Length (cm):  0.3       Area (sq cm):  0.09       Width (cm):  0.3       Percent Debrided (%):  100       Depth (cm):  0.1       Total Area Debrided (sq cm):  0.09    Depth of debridement:  Subcutaneous tissue    Tissue debrided:  Dermis    Devitalized tissue debrided:  Callus    Instruments:  Curette    Bleeding:  None  Patient tolerance:  Patient tolerated the procedure well with no immediate complications

## 2019-05-08 ENCOUNTER — OFFICE VISIT (OUTPATIENT)
Dept: PODIATRY | Facility: CLINIC | Age: 64
End: 2019-05-08
Payer: MEDICARE

## 2019-05-08 VITALS — BODY MASS INDEX: 28.34 KG/M2 | HEIGHT: 68 IN | WEIGHT: 187 LBS

## 2019-05-08 DIAGNOSIS — Z87.2 HEALED FOOT ULCER: ICD-10-CM

## 2019-05-08 DIAGNOSIS — E11.49 TYPE II DIABETES MELLITUS WITH NEUROLOGICAL MANIFESTATIONS: Primary | ICD-10-CM

## 2019-05-08 PROCEDURE — 99999 PR PBB SHADOW E&M-EST. PATIENT-LVL III: ICD-10-PCS | Mod: PBBFAC,,, | Performed by: PODIATRIST

## 2019-05-08 PROCEDURE — 99212 OFFICE O/P EST SF 10 MIN: CPT | Mod: S$PBB,,, | Performed by: PODIATRIST

## 2019-05-08 PROCEDURE — 99999 PR PBB SHADOW E&M-EST. PATIENT-LVL III: CPT | Mod: PBBFAC,,, | Performed by: PODIATRIST

## 2019-05-08 PROCEDURE — 99212 PR OFFICE/OUTPT VISIT, EST, LEVL II, 10-19 MIN: ICD-10-PCS | Mod: S$PBB,,, | Performed by: PODIATRIST

## 2019-05-08 PROCEDURE — 99213 OFFICE O/P EST LOW 20 MIN: CPT | Mod: PBBFAC,PO | Performed by: PODIATRIST

## 2019-05-08 NOTE — PROGRESS NOTES
Subjective:      Patient ID: Miguel Moore is a 63 y.o. male.    Chief Complaint: Wound Care (left foot between 3rd and 4th digit (PCP Dr Raymond))    Miguel is a 63 y.o. male who presents to the clinic for evaluation and treatment of high risk feet. 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 The patient's chief complaint is long, thick toenails on both feet and calluses on toes of left foot. Routine trimming of these help keep symptoms under control. This patient has documented high risk feet requiring routine maintenance secondary to diabetes mellitis and those secondary complications of diabetes, as mentioned. No other major complaints today. Has hx of stroke and right sided weakness.   New onset left 4th lateral toe ulceration does not know how this started. Thinks this started one week ago.     5/1/19: F/u left lateral 4th toe ulceration. Has been applying betadine and hydrofera blue. Presents in sandals today. States he wears the [employer] shoes previously dispensed at home and does not feel they are supportive to walk out with. H/o stroke.     5/8/19: F/u left lateral 4th toe ulceration. Has been applying betadine, hydrofera blue. Presents in sandals today.   PCP: Raciel Raymond MD    Date Last Seen by PCP:   Chief Complaint   Patient presents with    Wound Care     left foot between 3rd and 4th digit (PCP Dr Raymond)         Current shoe gear:   Tennis shoes         Hemoglobin A1C   Date Value Ref Range Status   01/04/2019 6.3 (H) 4.0 - 5.6 % Final     Comment:     ADA Screening Guidelines:  5.7-6.4%  Consistent with prediabetes  >or=6.5%  Consistent with diabetes  High levels of fetal hemoglobin interfere with the HbA1C  assay. Heterozygous hemoglobin variants (HbS, HgC, etc)do  not significantly interfere with this assay.   However, presence of multiple variants may affect accuracy.     07/25/2018  6.0 (H) 4.0 - 5.6 % Final     Comment:     ADA Screening Guidelines:  5.7-6.4%  Consistent with prediabetes  >or=6.5%  Consistent with diabetes  High levels of fetal hemoglobin interfere with the HbA1C  assay. Heterozygous hemoglobin variants (HbS, HgC, etc)do  not significantly interfere with this assay.   However, presence of multiple variants may affect accuracy.     05/11/2018 6.2 (H) 4.0 - 5.6 % Final     Comment:     According to ADA guidelines, hemoglobin A1c <7.0% represents  optimal control in non-pregnant diabetic patients. Different  metrics may apply to specific patient populations.   Standards of Medical Care in Diabetes-2016.  For the purpose of screening for the presence of diabetes:  <5.7%     Consistent with the absence of diabetes  5.7-6.4%  Consistent with increasing risk for diabetes   (prediabetes)  >or=6.5%  Consistent with diabetes  Currently, no consensus exists for use of hemoglobin A1c  for diagnosis of diabetes for children.  This Hemoglobin A1c assay has significant interference with fetal   hemoglobin   (HbF). The results are invalid for patients with abnormal amounts of   HbF,   including those with known Hereditary Persistence   of Fetal Hemoglobin. Heterozygous hemoglobin variants (HbAS, HbAC,   HbAD, HbAE, HbA2) do not significantly interfere with this assay;   however, presence of multiple variants in a sample may impact the %   interference.       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 Tay    CATARACT EXTRACTION W/  INTRAOCULAR LENS IMPLANT Left 10/19/2017    Dr. Tay    FEMORAL ARTERY STENT      INSERTION-INTRAOCULAR LENS (IOL) Left 10/19/2017    [facility]     INSERTION-INTRAOCULAR LENS (IOL) Right 10/5/2017    [facility]    KNEE SURGERY      bilateral scope    PHACOEMULSIFICATION-ASPIRATION-CATARACT Left 10/19/2017    [facility]    PHACOEMULSIFICATION-ASPIRATION-CATARACT Right 10/5/2017    [facility]       Family History   Problem Relation Age of Onset    Diabetes Mother     Heart disease Mother     Hypertension Mother     Cataracts Mother     No Known Problems Father     Hypertension Sister     No Known Problems Brother     No Known Problems Daughter     No Known Problems Maternal Aunt     No Known Problems Maternal Uncle     No Known Problems Paternal Aunt     No Known Problems Paternal Uncle     No Known Problems Maternal Grandmother     No Known Problems Maternal Grandfather     No Known Problems Paternal Grandmother     No Known Problems Paternal Grandfather     Amblyopia Neg Hx     Blindness Neg Hx     Cancer Neg Hx     Glaucoma Neg Hx     Macular degeneration Neg Hx     Retinal detachment Neg Hx     Strabismus Neg Hx     Stroke Neg Hx     Thyroid disease Neg Hx        Social History     Socioeconomic History    Marital status: Single     Spouse name: Not on file    Number of children: Not on file    Years of education: Not on file    Highest education level: Not on file   Occupational History    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, etc   Social Needs    Financial resource strain: Not on file    Food insecurity:     Worry: Not on file     Inability: Not on file    Transportation needs:     Medical: Not on file     Non-medical: Not on file   Tobacco Use    Smoking status: Never Smoker    Smokeless tobacco: Never Used   Substance and Sexual Activity    Alcohol use: No    Drug use: No    Sexual activity: Not on file   Lifestyle    Physical activity:     Days per week: Not on file     Minutes  "per session: Not on file    Stress: Not on file   Relationships    Social connections:     Talks on phone: Not on file     Gets together: Not on file     Attends Christian service: Not on file     Active member of club or organization: Not on file     Attends meetings of clubs or organizations: Not on file     Relationship status: Not on file   Other Topics Concern    Not on file   Social History Narrative    Not on file       Current Outpatient Medications   Medication Sig Dispense Refill    amLODIPine (NORVASC) 5 MG tablet Take 1 tablet (5 mg total) by mouth once daily. 90 tablet 3    atorvastatin (LIPITOR) 80 MG tablet Take 1 tablet (80 mg total) by mouth once daily. 90 tablet 3    baclofen (LIORESAL) 10 MG tablet Take 1 tablet (10 mg total) by mouth 4 (four) times daily. 120 tablet 5    blood sugar diagnostic Strp ONCE DAILY BLOOD SUGAR TESTING; [employer] BR[employer] COVERED BY INSURANCE 30 strip 11    blood-glucose meter Misc 1 each by Misc.(Non-Drug; Combo Route) route once daily. Pharmacy-whichever brand specified by insurance 1 each 0    cholecalciferol, vitamin D3, (VITAMIN D3) 1,000 unit capsule Take 1 capsule (1,000 Units total) by mouth once daily. 90 capsule 3    ciprofloxacin HCl (CIPRO) 500 MG tablet Take 1 tablet (500 mg total) by mouth every 12 (twelve) hours. 20 tablet 0    clopidogrel (PLAVIX) 75 mg tablet Take 1 tablet (75 mg total) by mouth once daily. 90 tablet 3    furosemide (LASIX) 20 MG tablet Take 1 tablet (20 mg total) by mouth 2 (two) times daily. 90 tablet 3    insulin aspart U-100 (NOVOLOG) 100 unit/mL InPn pen Inject 7 Units into the skin 3 (three) times daily with meals. 5 Box 11    pen needle, diabetic 31 gauge x 1/4" Ndle For mealtime insulin 300 each 11    phenytoin (DILANTIN) 100 MG ER capsule Take 1 capsule (100 mg total) by mouth 3 (three) times daily. 270 capsule 3    tamsulosin (FLOMAX) 0.4 mg Cap Take 2 capsules (0.8 mg total) by mouth once daily. 180 capsule 3 "    lancets (ONETOUCH DELICA LANCETS) 33 gauge Misc 1 lancet by Misc.(Non-Drug; Combo Route) route once daily. ONCE DAILY BLOOD SUGAR TESTING; [employer] BR[employer] COVERED BY INSURANCE 30 each 11     No current facility-administered medications for this visit.        Review of patient's allergies indicates:   Allergen Reactions    Penicillins Hives       Review of Systems   Constitution: Negative for chills and fever.   Cardiovascular: Positive for leg swelling. Negative for chest pain and claudication.   Respiratory: Negative for cough and shortness of breath.    Skin: Positive for dry skin and suspicious lesions (corns/callus).   Musculoskeletal: Positive for muscle weakness.   Gastrointestinal: Negative for nausea and vomiting.   Neurological: Positive for focal weakness (right sided), numbness and sensory change. Negative for paresthesias.   Psychiatric/Behavioral: Negative for altered mental status.           Objective:      Physical Exam   Constitutional: He is oriented to person, place, and time. He appears well-developed and well-nourished.   HENT:   Head: Normocephalic.   Cardiovascular: Intact distal pulses.   Pulses:       Dorsalis pedis pulses are 2+ on the right side, and 2+ on the left side.        Posterior tibial pulses are 1+ on the right side, and 1+ on the left side.   CRT < 3 sec to tips of toes. 2+ pitting edema noted to b/l LE. No vericosities noted to b/l LEs.      Pulmonary/Chest: No respiratory distress.   Musculoskeletal:   Gastrocnemius equinus noted to b/l ankles with decreased DF noted on exam. MMT 5/5 in DF/PF/Inv/Ev resistance with no reproduction of pain in any direction Right, 3/5 left. Passive range of motion of ankle and pedal joints is painless. Adequate pedal joint ROM.     Rigid hammertoe contractures noted to toes 2-4 R-asymptomatic.     Limited hallux MTPJ ROM with plantarflexion contracture of b/l hallux IPJ, rigid R semi-reducible L.    Neurological: He is alert and oriented to  person, place, and time. He has normal strength. A sensory deficit is present.   Light touch, proprioception, and sharp/dull sensation are all intact bilaterally. Protective threshold with the Upperco-Wienstein monofilament is intact bilaterally. Vibratory sensation diminished b/l distal foot.      Skin: Skin is warm, dry and intact. Lesion noted. No ecchymosis noted. No erythema.   No open lesions, lacerations or wounds noted. Nails are dystrophic, elongated, thickened with yellow/brown discoloration to R 1 and L 1-5. Remaining toenails normotrophic.  Interdigital spaces clean, dry and intact b/l. No erythema noted to b/l foot. Skin texture thin, shiny, atrophic. Pedal hair absent. Toes cool to touch b/l.        Psychiatric: He has a normal mood and affect. His behavior is normal. Judgment and thought content normal.   Vitals reviewed.    Wound 1:Left lateral 4th toe ulceration. Healed with fragile epithelium.   Measurement: 0x0x0  Base: [facility]    5/8/19 5/1/19 4/24/19                Assessment:       Encounter Diagnoses   Name Primary?    Type II diabetes mellitus with neurological manifestations Yes    Healed foot ulcer - Left Foot          Plan:       Miguel was seen today for wound care.    Diagnoses and all orders for this visit:    Type II diabetes mellitus with neurological manifestations    Healed foot ulcer - Left Foot      I counseled the patient on his conditions, their implications and medical management.    - Shoe inspection. Diabetic Foot Education. Patient reminded of the importance of good nutrition and blood sugar control to help prevent podiatric complications of diabetes. Patient instructed on proper foot hygeine. We discussed wearing proper shoe gear, daily foot inspections, never walking without protective shoe gear, never putting sharp instruments to feet, routine podiatric nail visits every 2-3 months.      Discussed regular and routine moisturizer to skin of both  feet to help improve dry skin. Advised to apply twice daily until resolution of symptoms. Avoid between toes. Recommended Gold Bond Foot cream or Diabetic cream.       Debridement:  With patient's permission a sterile curette used to trim callus wound healed with fragile epithelium.     Dressings: Iodosorb  Offloading:Hydroferal blue,     Instructed to continue to wear [employer] shoe.     Prescription for cipro sent to pharmacy- continue.      Left foot xray to assess underlying deformity and for underlying osseous pathology. - reviewed.     Prescription for Augmentin sent to pharmacy.- completed.      Follow-up:Patient is to return to the clinic in two week for follow-up but should call [facility] immediately if any signs of infection, such as fever, chills, sweats, increased redness or pain.    Short-term goals include maintaining good offloading and minimizing bioburden, promoting granulation and epithelialization to healing.  Long-term goals include keeping the wound healed by good offloading and medical management under the direction of internist.      Sabi Douglas DPM

## 2019-05-17 DIAGNOSIS — E11.9 TYPE 2 DIABETES MELLITUS WITHOUT COMPLICATION: ICD-10-CM

## 2019-05-22 ENCOUNTER — OFFICE VISIT (OUTPATIENT)
Dept: PODIATRY | Facility: CLINIC | Age: 64
End: 2019-05-22
Payer: MEDICARE

## 2019-05-22 VITALS — BODY MASS INDEX: 28.34 KG/M2 | HEIGHT: 68 IN | WEIGHT: 187 LBS

## 2019-05-22 DIAGNOSIS — E11.49 TYPE II DIABETES MELLITUS WITH NEUROLOGICAL MANIFESTATIONS: Primary | ICD-10-CM

## 2019-05-22 DIAGNOSIS — Z87.2 HEALED FOOT ULCER: ICD-10-CM

## 2019-05-22 PROCEDURE — 99999 PR PBB SHADOW E&M-EST. PATIENT-LVL III: CPT | Mod: PBBFAC,,, | Performed by: PODIATRIST

## 2019-05-22 PROCEDURE — 99999 PR PBB SHADOW E&M-EST. PATIENT-LVL III: ICD-10-PCS | Mod: PBBFAC,,, | Performed by: PODIATRIST

## 2019-05-22 PROCEDURE — 99212 OFFICE O/P EST SF 10 MIN: CPT | Mod: S$PBB,,, | Performed by: PODIATRIST

## 2019-05-22 PROCEDURE — 99212 PR OFFICE/OUTPT VISIT, EST, LEVL II, 10-19 MIN: ICD-10-PCS | Mod: S$PBB,,, | Performed by: PODIATRIST

## 2019-05-22 PROCEDURE — 99213 OFFICE O/P EST LOW 20 MIN: CPT | Mod: PBBFAC,PO | Performed by: PODIATRIST

## 2019-05-24 NOTE — PROGRESS NOTES
Subjective:      Patient ID: Miguel Moore is a 63 y.o. male.    Chief Complaint: Wound Care (left foot between 4th and 5th toe (PCP Dr Raymond))    Miguel is a 63 y.o. male who presents to the clinic for evaluation and treatment of high risk feet. 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 The patient's chief complaint is long, thick toenails on both feet and calluses on toes of left foot. Routine trimming of these help keep symptoms under control. This patient has documented high risk feet requiring routine maintenance secondary to diabetes mellitis and those secondary complications of diabetes, as mentioned. No other major complaints today. Has hx of stroke and right sided weakness.   New onset left 4th lateral toe ulceration does not know how this started. Thinks this started one week ago.     5/1/19: F/u left lateral 4th toe ulceration. Has been applying betadine and hydrofera blue. Presents in sandals today. States he wears the [employer] shoes previously dispensed at home and does not feel they are supportive to walk out with. H/o stroke.     5/8/19: F/u left lateral 4th toe ulceration. Has been applying betadine, hydrofera blue. Presents in sandals today.     5/22/19: F/u left lateral 4th toe ulceration. Has been applying betadine to all interdigital spaces. Wound healed today. Presents with family.     PCP: Raciel Raymond MD    Date Last Seen by PCP:   Chief Complaint   Patient presents with    Wound Care     left foot between 4th and 5th toe (PCP Dr Raymond)         Current shoe gear:   Tennis shoes         Hemoglobin A1C   Date Value Ref Range Status   01/04/2019 6.3 (H) 4.0 - 5.6 % Final     Comment:     ADA Screening Guidelines:  5.7-6.4%  Consistent with prediabetes  >or=6.5%  Consistent with diabetes  High levels of fetal hemoglobin interfere with the HbA1C  assay. Heterozygous hemoglobin  variants (HbS, HgC, etc)do  not significantly interfere with this assay.   However, presence of multiple variants may affect accuracy.     07/25/2018 6.0 (H) 4.0 - 5.6 % Final     Comment:     ADA Screening Guidelines:  5.7-6.4%  Consistent with prediabetes  >or=6.5%  Consistent with diabetes  High levels of fetal hemoglobin interfere with the HbA1C  assay. Heterozygous hemoglobin variants (HbS, HgC, etc)do  not significantly interfere with this assay.   However, presence of multiple variants may affect accuracy.     05/11/2018 6.2 (H) 4.0 - 5.6 % Final     Comment:     According to ADA guidelines, hemoglobin A1c <7.0% represents  optimal control in non-pregnant diabetic patients. Different  metrics may apply to specific patient populations.   Standards of Medical Care in Diabetes-2016.  For the purpose of screening for the presence of diabetes:  <5.7%     Consistent with the absence of diabetes  5.7-6.4%  Consistent with increasing risk for diabetes   (prediabetes)  >or=6.5%  Consistent with diabetes  Currently, no consensus exists for use of hemoglobin A1c  for diagnosis of diabetes for children.  This Hemoglobin A1c assay has significant interference with fetal   hemoglobin   (HbF). The results are invalid for patients with abnormal amounts of   HbF,   including those with known Hereditary Persistence   of Fetal Hemoglobin. Heterozygous hemoglobin variants (HbAS, HbAC,   HbAD, HbAE, HbA2) do not significantly interfere with this assay;   however, presence of multiple variants in a sample may impact the %   interference.       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 Tay    CATARACT EXTRACTION W/  INTRAOCULAR LENS IMPLANT Left 10/19/2017      Jasvir    FEMORAL ARTERY STENT      INSERTION-INTRAOCULAR LENS (IOL) Left 10/19/2017    [facility]    INSERTION-INTRAOCULAR LENS (IOL) Right 10/5/2017    [facility]    KNEE SURGERY      bilateral scope    PHACOEMULSIFICATION-ASPIRATION-CATARACT Left 10/19/2017    [facility]    PHACOEMULSIFICATION-ASPIRATION-CATARACT Right 10/5/2017    [facility]       Family History   Problem Relation Age of Onset    Diabetes Mother     Heart disease Mother     Hypertension Mother     Cataracts Mother     No Known Problems Father     Hypertension Sister     No Known Problems Brother     No Known Problems Daughter     No Known Problems Maternal Aunt     No Known Problems Maternal Uncle     No Known Problems Paternal Aunt     No Known Problems Paternal Uncle     No Known Problems Maternal Grandmother     No Known Problems Maternal Grandfather     No Known Problems Paternal Grandmother     No Known Problems Paternal Grandfather     Amblyopia Neg Hx     Blindness Neg Hx     Cancer Neg Hx     Glaucoma Neg Hx     Macular degeneration Neg Hx     Retinal detachment Neg Hx     Strabismus Neg Hx     Stroke Neg Hx     Thyroid disease Neg Hx        Social History     Socioeconomic History    Marital status: Single     Spouse name: Not on file    Number of children: Not on file    Years of education: Not on file    Highest education level: Not on file   Occupational History    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, etc   Social Needs    Financial resource strain: Not on file    Food insecurity:     Worry: Not on file     Inability: Not on file    Transportation needs:     Medical: Not on file     Non-medical: Not on file   Tobacco Use    Smoking status: Never Smoker    Smokeless tobacco: Never Used   Substance and Sexual Activity  "   Alcohol use: No    Drug use: No    Sexual activity: Not on file   Lifestyle    Physical activity:     Days per week: Not on file     Minutes per session: Not on file    Stress: Not on file   Relationships    Social connections:     Talks on phone: Not on file     Gets together: Not on file     Attends Zoroastrian service: Not on file     Active member of club or organization: Not on file     Attends meetings of clubs or organizations: Not on file     Relationship status: Not on file   Other Topics Concern    Not on file   Social History Narrative    Not on file       Current Outpatient Medications   Medication Sig Dispense Refill    amLODIPine (NORVASC) 5 MG tablet Take 1 tablet (5 mg total) by mouth once daily. 90 tablet 3    atorvastatin (LIPITOR) 80 MG tablet Take 1 tablet (80 mg total) by mouth once daily. 90 tablet 3    baclofen (LIORESAL) 10 MG tablet Take 1 tablet (10 mg total) by mouth 4 (four) times daily. 120 tablet 5    blood sugar diagnostic Strp ONCE DAILY BLOOD SUGAR TESTING; [employer] BRAND COVERED BY INSURANCE 30 strip 11    cholecalciferol, vitamin D3, (VITAMIN D3) 1,000 unit capsule Take 1 capsule (1,000 Units total) by mouth once daily. 90 capsule 3    ciprofloxacin HCl (CIPRO) 500 MG tablet Take 1 tablet (500 mg total) by mouth every 12 (twelve) hours. 20 tablet 0    clopidogrel (PLAVIX) 75 mg tablet Take 1 tablet (75 mg total) by mouth once daily. 90 tablet 3    furosemide (LASIX) 20 MG tablet Take 1 tablet (20 mg total) by mouth 2 (two) times daily. 90 tablet 3    insulin aspart U-100 (NOVOLOG) 100 unit/mL InPn pen Inject 7 Units into the skin 3 (three) times daily with meals. 5 Box 11    pen needle, diabetic 31 gauge x 1/4" Ndle For mealtime insulin 300 each 11    phenytoin (DILANTIN) 100 MG ER capsule Take 1 capsule (100 mg total) by mouth 3 (three) times daily. 270 capsule 3    tamsulosin (FLOMAX) 0.4 mg Cap Take 2 capsules (0.8 mg total) by mouth once daily. 180 capsule " 3    blood-glucose meter Misc 1 each by Misc.(Non-Drug; Combo Route) route once daily. Pharmacy-whichever brand specified by insurance 1 each 0    lancets (ONETOUCH DELICA LANCETS) 33 gauge Misc 1 lancet by Misc.(Non-Drug; Combo Route) route once daily. ONCE DAILY BLOOD SUGAR TESTING; [employer] BRAND COVERED BY INSURANCE 30 each 11     No current facility-administered medications for this visit.        Review of patient's allergies indicates:   Allergen Reactions    Penicillins Hives       Review of Systems   Constitution: Negative for chills and fever.   Cardiovascular: Positive for leg swelling. Negative for chest pain and claudication.   Respiratory: Negative for cough and shortness of breath.    Skin: Positive for dry skin and suspicious lesions (corns/callus).   Musculoskeletal: Positive for muscle weakness.   Gastrointestinal: Negative for nausea and vomiting.   Neurological: Positive for focal weakness (right sided), numbness and sensory change. Negative for paresthesias.   Psychiatric/Behavioral: Negative for altered mental status.           Objective:      Physical Exam   Constitutional: He is oriented to person, place, and time. He appears well-developed and well-nourished.   HENT:   Head: Normocephalic.   Cardiovascular: Intact distal pulses.   Pulses:       Dorsalis pedis pulses are 2+ on the right side, and 2+ on the left side.        Posterior tibial pulses are 1+ on the right side, and 1+ on the left side.   CRT < 3 sec to tips of toes. 2+ pitting edema noted to b/l LE. No vericosities noted to b/l LEs.      Pulmonary/Chest: No respiratory distress.   Musculoskeletal:   Gastrocnemius equinus noted to b/l ankles with decreased DF noted on exam. MMT 5/5 in DF/PF/Inv/Ev resistance with no reproduction of pain in any direction Right, 3/5 left. Passive range of motion of ankle and pedal joints is painless. Adequate pedal joint ROM.     Rigid hammertoe contractures noted to toes 2-4 R-asymptomatic.      Limited hallux MTPJ ROM with plantarflexion contracture of b/l hallux IPJ, rigid R semi-reducible L.    Neurological: He is alert and oriented to person, place, and time. He has normal strength. A sensory deficit is present.   Light touch, proprioception, and sharp/dull sensation are all intact bilaterally. Protective threshold with the Bybee-Wienstein monofilament is intact bilaterally. Vibratory sensation diminished b/l distal foot.      Skin: Skin is warm, dry and intact. Lesion noted. No ecchymosis noted. No erythema.   No open lesions, lacerations or wounds noted. Nails are dystrophic, elongated, thickened with yellow/brown discoloration to R 1 and L 1-5. Remaining toenails normotrophic.  Interdigital spaces clean, dry and intact b/l. No erythema noted to b/l foot. Skin texture thin, shiny, atrophic. Pedal hair absent. Toes cool to touch b/l.        Psychiatric: He has a normal mood and affect. His behavior is normal. Judgment and thought content normal.   Vitals reviewed.    Wound 1:Left lateral 4th toe ulceration. Healed   Base: HPK    5/22/19 5/8/19 5/1/19 4/24/19                Assessment:       Encounter Diagnoses   Name Primary?    Type II diabetes mellitus with neurological manifestations Yes    Healed foot ulcer - Left Foot          Plan:       Miguel was seen today for wound care.    Diagnoses and all orders for this visit:    Type II diabetes mellitus with neurological manifestations    Healed foot ulcer - Left Foot      I counseled the patient on his conditions, their implications and medical management.    - Shoe inspection. Diabetic Foot Education. Patient reminded of the importance of good nutrition and blood sugar control to help prevent podiatric complications of diabetes. Patient instructed on proper foot hygeine. We discussed wearing proper shoe gear, daily foot inspections, never walking without protective shoe gear, never putting sharp  instruments to feet, routine podiatric nail visits every 2-3 months.      Discussed regular and routine moisturizer to skin of both feet to help improve dry skin. Advised to apply twice daily until resolution of symptoms. Avoid between toes. Recommended Gold Bond Foot cream or Diabetic cream.      - With patient's permission, nails were aggressively reduced and debrided x 10 to their soft tissue attachment mechanically and with electric , removing all offending nail and debris. Patient relates relief following the procedure. He will continue to monitor the areas daily, inspect his feet, wear protective shoe gear when ambulatory, moisturizer to maintain skin integrity and follow in this office in approximately 9 weeks, sooner p.r.n.      Debridement:  With patient's permission a sterile curette used to trim callus wound healed.     Dressings: Betadine   Offloading:Hydroferal blue,     Instructed to continue to wear [employer] shoe.     Prescription for cipro sent to pharmacy- completed     Left foot xray to assess underlying deformity and for underlying osseous pathology. - reviewed.     Prescription for Augmentin sent to pharmacy.- completed.      Follow-up:Patient is to return to the clinic in 9 weeks for follow-up but should call [facility] immediately if any signs of infection, such as fever, chills, sweats, increased redness or pain.    Short-term goals include maintaining good offloading and minimizing bioburden, promoting granulation and epithelialization to healing.  Long-term goals include keeping the wound healed by good offloading and medical management under the direction of internist.      Sabi Douglas DPM

## 2019-06-11 DIAGNOSIS — N18.30 CONTROLLED TYPE 2 DIABETES MELLITUS WITH STAGE 3 CHRONIC KIDNEY DISEASE, WITHOUT LONG-TERM CURRENT USE OF INSULIN: ICD-10-CM

## 2019-06-11 DIAGNOSIS — E11.22 CONTROLLED TYPE 2 DIABETES MELLITUS WITH STAGE 3 CHRONIC KIDNEY DISEASE, WITHOUT LONG-TERM CURRENT USE OF INSULIN: ICD-10-CM

## 2019-06-11 RX ORDER — INSULIN ASPART 100 [IU]/ML
INJECTION, SOLUTION INTRAVENOUS; SUBCUTANEOUS
Qty: 15 SYRINGE | Refills: 11 | Status: SHIPPED | OUTPATIENT
Start: 2019-06-11 | End: 2020-03-27 | Stop reason: ALTCHOICE

## 2019-06-11 NOTE — TELEPHONE ENCOUNTER
----- Message from Alejandrina Segundo sent at 6/11/2019 11:32 AM CDT -----  Contact: Miguel [phone]  Type: RX Refill Request    Who Called: Miguel    Refill or New Rx:Refill     RX Name and Strength:insulin aspart U-100 (NOVOLOG) 100 unit/mL InPn pen    Is this a 30 day or 90 day RX:30 day    Preferred Pharmacy with phone number:[facility] - [place] ([place], [address]    Would the patient rather a call back or a response via [facility]? No response necessary    Best Call Back Number:[phone]

## 2019-07-01 ENCOUNTER — PATIENT OUTREACH (OUTPATIENT)
Dept: ADMINISTRATIVE | Facility: HOSPITAL | Age: 64
End: 2019-07-01

## 2019-07-01 DIAGNOSIS — Z79.4 CONTROLLED TYPE 2 DIABETES MELLITUS WITH STAGE 3 CHRONIC KIDNEY DISEASE, WITH LONG-TERM CURRENT USE OF INSULIN: Primary | Chronic | ICD-10-CM

## 2019-07-01 DIAGNOSIS — N18.30 CONTROLLED TYPE 2 DIABETES MELLITUS WITH STAGE 3 CHRONIC KIDNEY DISEASE, WITH LONG-TERM CURRENT USE OF INSULIN: Primary | Chronic | ICD-10-CM

## 2019-07-01 DIAGNOSIS — E11.22 CONTROLLED TYPE 2 DIABETES MELLITUS WITH STAGE 3 CHRONIC KIDNEY DISEASE, WITH LONG-TERM CURRENT USE OF INSULIN: Primary | Chronic | ICD-10-CM

## 2019-07-01 NOTE — PROGRESS NOTES
Reached out to pt in regards to overdue HM pt unavailable, left voicemail. Letter also mailed out in regards to overdue HM. Please schedule overdue labs and discuss Fitkit. Eye exam needed. Referral placed, please sign.

## 2019-07-08 ENCOUNTER — TELEPHONE (OUTPATIENT)
Dept: FAMILY MEDICINE | Facility: CLINIC | Age: 64
End: 2019-07-08

## 2019-07-08 NOTE — TELEPHONE ENCOUNTER
----- Message from Padma Vera sent at 7/8/2019  8:07 AM CDT -----  Contact: Self   Type: Patient Call Back    What is the request in detail: Pt returning call. Pt states he received missed call from office.     Can the clinic reply by [facility]? no    Would the patient rather a call back or a response via [facility]? call    Best call back number: [phone]

## 2019-07-10 ENCOUNTER — TELEPHONE (OUTPATIENT)
Dept: OPTOMETRY | Facility: CLINIC | Age: 64
End: 2019-07-10

## 2019-07-19 ENCOUNTER — PATIENT OUTREACH (OUTPATIENT)
Dept: ADMINISTRATIVE | Facility: HOSPITAL | Age: 64
End: 2019-07-19

## 2019-07-19 NOTE — PROGRESS NOTES
Reached out to pt in regards to overdue HM pt unavailable, left voicemail. Letter also mailed out in regards to overdue HM. Please schedule overdue labs and discuss Fitkit. Eye exam needed.

## 2019-08-08 NOTE — TELEPHONE ENCOUNTER
Mini Mckay Patient Age: 76 year old  MESSAGE:   Pt returning call    No further information given    Call connected to  OB Triage Queue.  Routed to provider's clinical pool.     WEIGHT AND HEIGHT:   Wt Readings from Last 1 Encounters:   05/08/19 59.4 kg (131 lb)     Ht Readings from Last 1 Encounters:   05/07/19 5' 2\" (1.575 m)     BMI Readings from Last 1 Encounters:   05/08/19 23.96 kg/m²       ALLERGIES:  Sulfa antibiotics; Penicillins; Tetracycline; Aspirin; Clindamycin; Codeine; Ibuprofen; Nsaids; and Opioid analgesics  Current Outpatient Medications   Medication   • nitrofurantoin, macrocrystal-monohydrate, (MACROBID) 100 MG capsule   • dilTIAZem (CARDIZEM CD) 240 MG 24 hr capsule   • loratadine (CLARITIN) 10 MG tablet   • cefdinir (OMNICEF) 300 MG capsule   • ranitidine (ZANTAC) 300 MG capsule   • cephalexin (KEFLEX) 500 MG capsule   • Acetaminophen (TYLENOL ARTHRITIS PAIN PO)   • citalopram (CELEXA) 20 MG tablet   • pravastatin (PRAVACHOL) 10 MG tablet   • diclofenac (VOLTAREN) 1 % gel   • pantoprazole (PROTONIX) 40 MG tablet   • carisoprodol (SOMA) 250 MG tablet   • Calcium Carbonate 1500 (600 Ca) MG Tab   • ranitidine (ZANTAC) 300 MG capsule   • vitamin D, Cholecalciferol, 1000 units capsule     No current facility-administered medications for this visit.      PHARMACY to use:           Pharmacy preference(s) on file:   [id number] - [place], [place] - [address]  [address] [address]  Phone: [phone] Fax: [phone]    [id number] - [place], [place] - [address] [facility] AT [address] [address]  Phone: [phone] Fax: [phone]      CALL BACK INFO: Ok to leave response (including medical information) on answering machine  ROUTING: Patient's physician/staff        PCP: Paco Jeffrey DO         INS: Payor: [employer] / Plan: GROUP MEDICARE ADV [id number] / Product Type: MEDICARE   PATIENT ADDRESS:  [address] [place] [address]  ----- Message from Raciel Raymond MD sent at 8/27/2018  8:35 AM CDT -----  K better still high  Was started on amlodipine  And previously his ACEI was stopped.    Can we get him in for nurse visit for BP check on amlodipine and repeat BMP? Labs ordered   [place] [address]

## 2019-08-09 DIAGNOSIS — Z12.11 COLON CANCER SCREENING: ICD-10-CM

## 2019-09-08 NOTE — PROGRESS NOTES
This note was created by combination of typed  and Dragon dictation.  Transcription errors may be present.  If there are any questions, please contact me.    Assessment & Plan:   Pedal edema  -notes worse compared to baseline.  Taking lasix daily.  Due to salt intake? Worsening of renal function?   Check labs. Lasix currently daily, increase to bid temporarily.    CKD (chronic kidney disease) stage 4, GFR 15-29 ml/min with proteinuria  -never got set up with nephrology, discussed importance and rationale. CKD stage 4, but if progresses, may need to see nephrology to discuss natural course/history and possibly dialysis needs down the road. Check labs.  Re-referred to nephrology.  -     Ambulatory referral to Nephrology  -     Comprehensive metabolic panel; Future; Expected date: 09/09/2019  -     Phosphorus; Future; Expected date: 09/09/2019    Anemia, unspecified type  Screening for colon cancer  -unable to do fitkit b/c of dexterity issues (hemiplegic).  Will refer to colonoscopy.  His significant other is agreeable to this.  -     Case request GI: COLONOSCOPY    Need for vaccination for Strep pneumoniae  -     (In Office Administered) Pneumococcal Conjugate Vaccine (13 Valent) (IM)        Medications Discontinued During This Encounter   Medication Reason    ciprofloxacin HCl (CIPRO) 500 MG tablet Patient no longer taking    insulin aspart U-100 (NOVOLOG) 100 unit/mL InPn pen Duplicate Order       meds sent this encounter:       Follow Up: No follow-ups on file.    Subjective:     Chief Complaint   Patient presents with    Edema     right leg.       ELADIA Rivera is a 64 y.o. male, last appointment with this clinic was Visit date not found.    I previously saw him back in January.  Diabetes, on insulin.  Chronic kidney disease with secondary hyperparathyroid  Hypertension, history of ACE-inhibitor/ARB with hyperkalemia.  History of stroke with right-sided spasticity and hemiplegia.  I had referred him  for home health PT/OT.  I referred him for physical medicine and rehabilitation for motorized wheelchair evaluation.  Peripheral edema, taking Lasix as needed    Labs done in January, chronic kidney disease stage 4  Vitamin-D at that time was low.  A1c was good.  Microcytic anemia at that time.  It was difficult for him to do stool cards because of dexterity issues.  I had referred him to Nephrology    Does not appear he got set up with Nephrology.    Seeing Podiatry.  For left lateral 4th toe ulceration.  Healed.    Labs ordered    Swelling in the legs worse in the past 4 days.  Possibly increased salt intake. No dyspnea. No chest pain.  Wearing compression stockings.  Lasix once a day. Some days he skips if he is going out.  Last time 1/2019 was taking lasix daily PRN.   No chronic nsaids.   BP high on intake.  Better on repeat.    DM - taking insulin, novolog 7 units with meals.         Patient Care Team:  Raciel Raymond MD as PCP - General (Internal Medicine)  Gabriella Manjarrez DPM (Inactive) as Consulting Physician (Podiatry)  Mac Chambers Jr., MD as Consulting Physician (Urology)  Geovany Rucker MD as Consulting Physician (Neurology)  Bob Tay MD as Consulting Physician (Ophthalmology)  Tres Reyna MA as Care Coordinator    Patient Active Problem List    Diagnosis Date Noted    Type 2 diabetes mellitus with diabetic neuropathy, with long-term current use of insulin 05/10/2018    History of stroke 12/04/2017    CME (cystoid macular edema), bilateral 11/16/2017    Refractive error 11/16/2017    Post-operative state 10/06/2017    Senile nuclear sclerosis 10/05/2017    Right sided weakness 09/11/2017    Secondary hyperparathyroidism of renal origin 08/03/2017    Vitamin D deficiency 08/03/2017    Impaired functional mobility, balance, gait, and endurance 06/23/2017    Nuclear sclerosis, left 06/09/2017    Cortical cataract of both eyes 06/09/2017    DM type 2 without  retinopathy 06/09/2017    Microcytosis 03/22/2017     Seen on admission as well 2015; normal Hb, low MCV.  Normal RDW      CKD (chronic kidney disease) stage 4, GFR 15-29 ml/min with proteinuria 03/22/2017    Benign essential HTN; ACEI hyperK 03/21/2017    Pure hypercholesterolemia 03/21/2017    Controlled type 2 diabetes mellitus with stage 3 chronic kidney disease, with long-term current use of insulin 03/21/2017    Benign non-nodular prostatic hyperplasia without lower urinary tract symptoms 03/21/2017 6/26/2017 renal US mod prostatomegaly.      Seizure disorder as sequela of cerebrovascular accident 03/21/2017    Hemiplegia and hemiparesis following cerebral infarction affecting right dominant side 03/21/2017       PAST MEDICAL HISTORY:  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    Stroke     Urinary tract infection        PAST SURGICAL HISTORY:  Past Surgical History:   Procedure Laterality Date    CATARACT EXTRACTION W/  INTRAOCULAR LENS IMPLANT Right 10/05/2017    Dr. Tay    CATARACT EXTRACTION W/  INTRAOCULAR LENS IMPLANT Left 10/19/2017    Dr. Tay    FEMORAL ARTERY STENT      INSERTION-INTRAOCULAR LENS (IOL) Left 10/19/2017    [facility]    INSERTION-INTRAOCULAR LENS (IOL) Right 10/5/2017    [facility] at [facility] OR    KNEE SURGERY      bilateral scope    PHACOEMULSIFICATION-ASPIRATION-CATARACT Left 10/19/2017    [facility] at [facility]    PHACOEMULSIFICATION-ASPIRATION-CATARACT Right 10/5/2017    [facility] at [facility] OR       SOCIAL HISTORY:  Social History     Socioeconomic History    Marital status: Single     Spouse name: Not on file    Number of children: Not on file    Years of education: Not on file    Highest education level: Not on file   Occupational  History    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, etc   Social Needs    Financial resource strain: Not on file    Food insecurity:     Worry: Not on file     Inability: Not on file    Transportation needs:     Medical: Not on file     Non-medical: Not on file   Tobacco Use    Smoking status: Never Smoker    Smokeless tobacco: Never Used   Substance and Sexual Activity    Alcohol use: No    Drug use: No    Sexual activity: Not on file   Lifestyle    Physical activity:     Days per week: Not on file     Minutes per session: Not on file    Stress: Not on file   Relationships    Social connections:     Talks on phone: Not on file     Gets together: Not on file     Attends Taoism service: Not on file     Active member of club or organization: Not on file     Attends meetings of clubs or organizations: Not on file     Relationship status: Not on file   Other Topics Concern    Not on file   Social History Narrative    Not on file       ALLERGIES AND MEDICATIONS: updated and reviewed.  Review of patient's allergies indicates:   Allergen Reactions    Ace inhibitors      Hyperkalemia 8/2018    Penicillins Hives     Current Outpatient Medications   Medication Sig Dispense Refill    amLODIPine (NORVASC) 5 MG tablet Take 1 tablet (5 mg total) by mouth once daily. 90 tablet 3    atorvastatin (LIPITOR) 80 MG tablet Take 1 tablet (80 mg total) by mouth once daily. 90 tablet 3    baclofen (LIORESAL) 10 MG tablet Take 1 tablet (10 mg total) by mouth 4 (four) times daily. 120 tablet 5    blood-glucose meter Misc 1 each by Misc.(Non-Drug; Combo Route) route once daily. Pharmacy-whichever brand specified by insurance 1 each 0    cholecalciferol, vitamin D3, (VITAMIN D3) 1,000 unit capsule Take 1 capsule (1,000 Units total) by mouth once daily. 90 capsule 3    clopidogrel (PLAVIX) 75 mg tablet Take 1 tablet (75 mg total) by mouth once daily. 90 tablet 3    furosemide (LASIX) 20 MG tablet Take 1  "tablet (20 mg total) by mouth 2 (two) times daily. 90 tablet 3    insulin aspart U-100 (NOVOLOG) 100 unit/mL InPn pen Inject 7 Units into the skin 3 (three) times daily with meals. 5 Box 11    lancets (ONETOUCH DELICA LANCETS) 33 gauge Misc 1 lancet by Misc.(Non-Drug; Combo Route) route once daily. ONCE DAILY BLOOD SUGAR TESTING; WHICHEVER BRAND COVERED BY INSURANCE 30 each 11    NOVOLOG FLEXPEN U-100 INSULIN 100 unit/mL (3 mL) InPn pen INJECT 7 UNITS UNDER THE SKIN 3 TIMES DAILY WITH MEALS 15 Syringe 11    pen needle, diabetic 31 gauge x 1/4" Ndle For mealtime insulin 300 each 11    phenytoin (DILANTIN) 100 MG ER capsule Take 1 capsule (100 mg total) by mouth 3 (three) times daily. 270 capsule 3    tamsulosin (FLOMAX) 0.4 mg Cap Take 2 capsules (0.8 mg total) by mouth once daily. 180 capsule 3     No current facility-administered medications for this visit.        Review of Systems   All other systems reviewed and are negative.      Objective:   Physical Exam   Vitals:    09/09/19 1317 09/09/19 1342   BP: (!) 176/86 138/66   BP Location: Left arm Left arm   Patient Position: Sitting Sitting   BP Method: Medium (Manual) Large (Manual)   Pulse: 97    Temp: 98.3 °F (36.8 °C)    TempSrc: Oral    SpO2: 98%    Weight: 93.6 kg (206 lb 5.6 oz)    Height: 5' 8" (1.727 m)     Body mass index is 31.38 kg/m².  Weight: 93.6 kg (206 lb 5.6 oz)   Height: 5' 8" (172.7 cm)     Physical Exam   Constitutional: He is oriented to person, place, and time. He appears well-developed and well-nourished. No distress.   Eyes: EOM are normal.   Cardiovascular: Normal rate, regular rhythm and normal heart sounds.   No murmur heard.  Pulmonary/Chest: Effort normal and breath sounds normal.   Musculoskeletal: He exhibits edema.   2+ edema to knees bilaterally; warm and perfused   Neurological: He is alert and oriented to person, place, and time. He exhibits abnormal muscle tone. Coordination abnormal.   R hemiplegia and R CN palsy   Skin: " Skin is warm and dry.   Psychiatric: He has a normal mood and affect. His behavior is normal. Thought content normal.

## 2019-09-09 ENCOUNTER — LAB VISIT (OUTPATIENT)
Dept: LAB | Facility: HOSPITAL | Age: 64
End: 2019-09-09
Attending: INTERNAL MEDICINE
Payer: MEDICARE

## 2019-09-09 ENCOUNTER — OFFICE VISIT (OUTPATIENT)
Dept: FAMILY MEDICINE | Facility: CLINIC | Age: 64
End: 2019-09-09
Payer: MEDICARE

## 2019-09-09 VITALS
HEIGHT: 68 IN | BODY MASS INDEX: 31.28 KG/M2 | WEIGHT: 206.38 LBS | SYSTOLIC BLOOD PRESSURE: 138 MMHG | HEART RATE: 97 BPM | DIASTOLIC BLOOD PRESSURE: 66 MMHG | TEMPERATURE: 98 F | OXYGEN SATURATION: 98 %

## 2019-09-09 DIAGNOSIS — D64.9 ANEMIA, UNSPECIFIED TYPE: ICD-10-CM

## 2019-09-09 DIAGNOSIS — N18.4 CKD (CHRONIC KIDNEY DISEASE) STAGE 4, GFR 15-29 ML/MIN: ICD-10-CM

## 2019-09-09 DIAGNOSIS — E11.9 TYPE 2 DIABETES MELLITUS WITHOUT COMPLICATION: ICD-10-CM

## 2019-09-09 DIAGNOSIS — Z23 NEED FOR VACCINATION FOR STREP PNEUMONIAE: ICD-10-CM

## 2019-09-09 DIAGNOSIS — Z79.4 CONTROLLED TYPE 2 DIABETES MELLITUS WITH STAGE 3 CHRONIC KIDNEY DISEASE, WITH LONG-TERM CURRENT USE OF INSULIN: Chronic | ICD-10-CM

## 2019-09-09 DIAGNOSIS — E11.22 CONTROLLED TYPE 2 DIABETES MELLITUS WITH STAGE 3 CHRONIC KIDNEY DISEASE, WITH LONG-TERM CURRENT USE OF INSULIN: Chronic | ICD-10-CM

## 2019-09-09 DIAGNOSIS — Z12.11 SCREENING FOR COLON CANCER: ICD-10-CM

## 2019-09-09 DIAGNOSIS — N18.30 CONTROLLED TYPE 2 DIABETES MELLITUS WITH STAGE 3 CHRONIC KIDNEY DISEASE, WITH LONG-TERM CURRENT USE OF INSULIN: Chronic | ICD-10-CM

## 2019-09-09 DIAGNOSIS — D50.9 MICROCYTIC ANEMIA: ICD-10-CM

## 2019-09-09 DIAGNOSIS — R60.0 PEDAL EDEMA: Primary | ICD-10-CM

## 2019-09-09 LAB
ALBUMIN SERPL BCP-MCNC: 3 G/DL (ref 3.5–5.2)
ALP SERPL-CCNC: 67 U/L (ref 55–135)
ALT SERPL W/O P-5'-P-CCNC: 13 U/L (ref 10–44)
ANION GAP SERPL CALC-SCNC: 7 MMOL/L (ref 8–16)
AST SERPL-CCNC: 14 U/L (ref 10–40)
BASOPHILS # BLD AUTO: 0.02 K/UL (ref 0–0.2)
BASOPHILS NFR BLD: 0.5 % (ref 0–1.9)
BILIRUB SERPL-MCNC: 0.2 MG/DL (ref 0.1–1)
BUN SERPL-MCNC: 45 MG/DL (ref 8–23)
CALCIUM SERPL-MCNC: 8 MG/DL (ref 8.7–10.5)
CHLORIDE SERPL-SCNC: 110 MMOL/L (ref 95–110)
CHOLEST SERPL-MCNC: 205 MG/DL (ref 120–199)
CHOLEST/HDLC SERPL: 3.3 {RATIO} (ref 2–5)
CO2 SERPL-SCNC: 21 MMOL/L (ref 23–29)
CREAT SERPL-MCNC: 2.9 MG/DL (ref 0.5–1.4)
DIFFERENTIAL METHOD: ABNORMAL
EOSINOPHIL # BLD AUTO: 0.1 K/UL (ref 0–0.5)
EOSINOPHIL NFR BLD: 1.9 % (ref 0–8)
ERYTHROCYTE [DISTWIDTH] IN BLOOD BY AUTOMATED COUNT: 14.1 % (ref 11.5–14.5)
EST. GFR  (AFRICAN AMERICAN): 25.1 ML/MIN/1.73 M^2
EST. GFR  (NON AFRICAN AMERICAN): 21.7 ML/MIN/1.73 M^2
ESTIMATED AVG GLUCOSE: 128 MG/DL (ref 68–131)
FERRITIN SERPL-MCNC: 200 NG/ML (ref 20–300)
GLUCOSE SERPL-MCNC: 153 MG/DL (ref 70–110)
HBA1C MFR BLD HPLC: 6.1 % (ref 4–5.6)
HCT VFR BLD AUTO: 32.5 % (ref 40–54)
HDLC SERPL-MCNC: 62 MG/DL (ref 40–75)
HDLC SERPL: 30.2 % (ref 20–50)
HGB BLD-MCNC: 10.5 G/DL (ref 14–18)
IMM GRANULOCYTES # BLD AUTO: 0.01 K/UL (ref 0–0.04)
IMM GRANULOCYTES NFR BLD AUTO: 0.3 % (ref 0–0.5)
IRON SERPL-MCNC: 92 UG/DL (ref 45–160)
LDLC SERPL CALC-MCNC: 133 MG/DL (ref 63–159)
LYMPHOCYTES # BLD AUTO: 0.8 K/UL (ref 1–4.8)
LYMPHOCYTES NFR BLD: 22.1 % (ref 18–48)
MCH RBC QN AUTO: 25.1 PG (ref 27–31)
MCHC RBC AUTO-ENTMCNC: 32.3 G/DL (ref 32–36)
MCV RBC AUTO: 78 FL (ref 82–98)
MONOCYTES # BLD AUTO: 0.4 K/UL (ref 0.3–1)
MONOCYTES NFR BLD: 9.3 % (ref 4–15)
NEUTROPHILS # BLD AUTO: 2.5 K/UL (ref 1.8–7.7)
NEUTROPHILS NFR BLD: 65.9 % (ref 38–73)
NONHDLC SERPL-MCNC: 143 MG/DL
NRBC BLD-RTO: 0 /100 WBC
PHOSPHATE SERPL-MCNC: 3.2 MG/DL (ref 2.7–4.5)
PLATELET # BLD AUTO: 151 K/UL (ref 150–350)
PMV BLD AUTO: 10.9 FL (ref 9.2–12.9)
POTASSIUM SERPL-SCNC: 5.1 MMOL/L (ref 3.5–5.1)
PROT SERPL-MCNC: 5.7 G/DL (ref 6–8.4)
RBC # BLD AUTO: 4.19 M/UL (ref 4.6–6.2)
SATURATED IRON: 40 % (ref 20–50)
SODIUM SERPL-SCNC: 138 MMOL/L (ref 136–145)
TOTAL IRON BINDING CAPACITY: 232 UG/DL (ref 250–450)
TRANSFERRIN SERPL-MCNC: 157 MG/DL (ref 200–375)
TRIGL SERPL-MCNC: 50 MG/DL (ref 30–150)
WBC # BLD AUTO: 3.76 K/UL (ref 3.9–12.7)

## 2019-09-09 PROCEDURE — 85025 COMPLETE CBC W/AUTO DIFF WBC: CPT

## 2019-09-09 PROCEDURE — 80053 COMPREHEN METABOLIC PANEL: CPT

## 2019-09-09 PROCEDURE — 84100 ASSAY OF PHOSPHORUS: CPT

## 2019-09-09 PROCEDURE — 99214 OFFICE O/P EST MOD 30 MIN: CPT | Mod: S$PBB,,, | Performed by: INTERNAL MEDICINE

## 2019-09-09 PROCEDURE — 80061 LIPID PANEL: CPT

## 2019-09-09 PROCEDURE — 99999 PR PBB SHADOW E&M-EST. PATIENT-LVL IV: ICD-10-PCS | Mod: PBBFAC,,, | Performed by: INTERNAL MEDICINE

## 2019-09-09 PROCEDURE — 82728 ASSAY OF FERRITIN: CPT

## 2019-09-09 PROCEDURE — 99999 PR PBB SHADOW E&M-EST. PATIENT-LVL IV: CPT | Mod: PBBFAC,,, | Performed by: INTERNAL MEDICINE

## 2019-09-09 PROCEDURE — 99214 PR OFFICE/OUTPT VISIT, EST, LEVL IV, 30-39 MIN: ICD-10-PCS | Mod: S$PBB,,, | Performed by: INTERNAL MEDICINE

## 2019-09-09 PROCEDURE — 99214 OFFICE O/P EST MOD 30 MIN: CPT | Mod: PBBFAC,PO | Performed by: INTERNAL MEDICINE

## 2019-09-09 PROCEDURE — G0009 ADMIN PNEUMOCOCCAL VACCINE: HCPCS | Mod: PBBFAC,PO

## 2019-09-09 PROCEDURE — 36415 COLL VENOUS BLD VENIPUNCTURE: CPT | Mod: PO

## 2019-09-09 PROCEDURE — 83036 HEMOGLOBIN GLYCOSYLATED A1C: CPT

## 2019-09-09 PROCEDURE — 83540 ASSAY OF IRON: CPT

## 2019-09-09 NOTE — PATIENT INSTRUCTIONS
If you have not been contacted in a week about your referral to nephrology (kidney doctor), please call my Referrals Coordinator at [phone]      Please contact the Endoscopy department to schedule your colonoscopy at [phone] before 3 PM or [phone] after 3 PM.   If neither number answers, call [phone]

## 2019-09-10 ENCOUNTER — TELEPHONE (OUTPATIENT)
Dept: FAMILY MEDICINE | Facility: CLINIC | Age: 64
End: 2019-09-10

## 2019-09-10 NOTE — TELEPHONE ENCOUNTER
----- Message from Raciel Raymond MD sent at 9/10/2019 10:00 AM CDT -----  A1c good 6.1  Chronic kidney disease stage 4 from previous stage III, has been climbing.  Had been referred to Nephrology at his visit.  Phosphorus was normal.  Ferritin was normal and iron profile, consistent with anemia of chronic kidney disease  CBC hemoglobin decreased.  Likely from anemia of chronic kidney disease.    Plan at office visit was to increase the Lasix to twice daily.  Please call the patient-his labs show his kidney disease is a bit worse.  Very important to see Nephrology.  Stay on his medicines otherwise.  Take the Lasix twice a day for the next 3 days and if his swelling gets better go back to once daily

## 2019-09-10 NOTE — PROGRESS NOTES
A1c good 6.1  Chronic kidney disease stage 4 from previous stage III, has been climbing.  Had been referred to Nephrology at his visit.  Phosphorus was normal.  Ferritin was normal and iron profile, consistent with anemia of chronic kidney disease  CBC hemoglobin decreased.  Likely from anemia of chronic kidney disease.    Plan at office visit was to increase the Lasix to twice daily.  Please call the patient-his labs show his kidney disease is a bit worse.  Very important to see Nephrology.  Stay on his medicines otherwise.  Take the Lasix twice a day for the next 3 days and if his swelling gets better go back to once daily

## 2019-09-13 ENCOUNTER — PATIENT OUTREACH (OUTPATIENT)
Dept: ADMINISTRATIVE | Facility: OTHER | Age: 64
End: 2019-09-13

## 2019-09-16 ENCOUNTER — TELEPHONE (OUTPATIENT)
Dept: FAMILY MEDICINE | Facility: CLINIC | Age: 64
End: 2019-09-16

## 2019-09-16 NOTE — TELEPHONE ENCOUNTER
----- Message from Dinora Odonnell sent at 9/16/2019  9:15 AM CDT -----  Contact: self  Type: Patient Call Back    Who called:self    What is the request in detail: patient is saying he should have a referral to see a kidney specialist and no one at the Dr Office has spoke to him about anything.    Can the clinic reply by [facility]? no    Would the patient rather a call back or a response via [facility]? call    Best call back number:

## 2019-09-17 ENCOUNTER — OFFICE VISIT (OUTPATIENT)
Dept: PODIATRY | Facility: CLINIC | Age: 64
End: 2019-09-17
Payer: MEDICARE

## 2019-09-17 DIAGNOSIS — E11.49 TYPE II DIABETES MELLITUS WITH NEUROLOGICAL MANIFESTATIONS: Primary | ICD-10-CM

## 2019-09-17 DIAGNOSIS — Z86.73 HISTORY OF STROKE: ICD-10-CM

## 2019-09-17 DIAGNOSIS — B35.1 ONYCHOMYCOSIS DUE TO DERMATOPHYTE: ICD-10-CM

## 2019-09-17 PROCEDURE — 11721 ROUTINE FOOT CARE: ICD-10-PCS | Mod: S$PBB,Q9,, | Performed by: PODIATRIST

## 2019-09-17 PROCEDURE — 11721 DEBRIDE NAIL 6 OR MORE: CPT | Mod: PBBFAC,PO | Performed by: PODIATRIST

## 2019-09-17 PROCEDURE — 99499 UNLISTED E&M SERVICE: CPT | Mod: S$PBB,,, | Performed by: PODIATRIST

## 2019-09-17 PROCEDURE — 99499 NO LOS: ICD-10-PCS | Mod: S$PBB,,, | Performed by: PODIATRIST

## 2019-09-17 NOTE — PROCEDURES
Routine Foot Care  Date/Time: 9/17/2019 10:35 AM  Performed by: Sabi Douglas DPM  Authorized by: Sabi Douglas DPM     Consent Done?:  Yes (Verbal)    Nail Care Type:  Debride  Location(s): All  (Left 1st Toe, Left 3rd Toe, Left 2nd Toe, Left 4th Toe, Left 5th Toe, Right 1st Toe, Right 2nd Toe, Right 3rd Toe, Right 4th Toe and Right 5th Toe)  Patient tolerance:  Patient tolerated the procedure well with no immediate complications

## 2019-09-17 NOTE — PROGRESS NOTES
Subjective:      Patient ID: Miguel Moore is a 64 y.o. male.    Chief Complaint: No chief complaint on file.    Miguel is a 64 y.o. male who presents to the clinic for evaluation and treatment of high risk feet. 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 The patient's chief complaint is long, thick toenails on both feet and calluses on toes of left foot. Routine trimming of these help keep symptoms under control. This patient has documented high risk feet requiring routine maintenance secondary to diabetes mellitis and those secondary complications of diabetes, as mentioned. No other major complaints today. Has hx of stroke and right sided weakness.Presents for diabetic foot risk assessment. No new issues.    PCP: Raciel Raymond MD    Date Last Seen by PCP:   No chief complaint on file.        Current shoe gear:   Tennis shoes         Hemoglobin A1C   Date Value Ref Range Status   09/09/2019 6.1 (H) 4.0 - 5.6 % Final     Comment:     ADA Screening Guidelines:  5.7-6.4%  Consistent with prediabetes  >or=6.5%  Consistent with diabetes  High levels of fetal hemoglobin interfere with the HbA1C  assay. Heterozygous hemoglobin variants (HbS, HgC, etc)do  not significantly interfere with this assay.   However, presence of multiple variants may affect accuracy.     01/04/2019 6.3 (H) 4.0 - 5.6 % Final     Comment:     ADA Screening Guidelines:  5.7-6.4%  Consistent with prediabetes  >or=6.5%  Consistent with diabetes  High levels of fetal hemoglobin interfere with the HbA1C  assay. Heterozygous hemoglobin variants (HbS, HgC, etc)do  not significantly interfere with this assay.   However, presence of multiple variants may affect accuracy.     07/25/2018 6.0 (H) 4.0 - 5.6 % Final     Comment:     ADA Screening Guidelines:  5.7-6.4%  Consistent with prediabetes  >or=6.5%  Consistent with diabetes  High levels  of fetal hemoglobin interfere with the HbA1C  assay. Heterozygous hemoglobin variants (HbS, HgC, etc)do  not significantly interfere with this assay.   However, presence of multiple variants may affect accuracy.       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 Tay    CATARACT EXTRACTION W/  INTRAOCULAR LENS IMPLANT Left 10/19/2017    Dr. Tay    FEMORAL ARTERY STENT      INSERTION-INTRAOCULAR LENS (IOL) Left 10/19/2017    [facility]    INSERTION-INTRAOCULAR LENS (IOL) Right 10/5/2017    [facility]    KNEE SURGERY      bilateral scope    PHACOEMULSIFICATION-ASPIRATION-CATARACT Left 10/19/2017    [facility]    PHACOEMULSIFICATION-ASPIRATION-CATARACT Right 10/5/2017    [facility] at [facility]       Family History   Problem Relation Age of Onset    Diabetes Mother     Heart disease Mother     Hypertension Mother     Cataracts Mother     No Known Problems Father     Hypertension Sister     No Known Problems Brother     No Known Problems Daughter     No Known Problems Maternal Aunt     No Known Problems Maternal Uncle     No Known Problems Paternal Aunt     No Known Problems Paternal Uncle     No Known Problems Maternal Grandmother     No Known Problems Maternal Grandfather     No Known Problems Paternal Grandmother     No Known Problems Paternal Grandfather     Amblyopia Neg Hx     Blindness Neg Hx     Cancer Neg Hx     Glaucoma Neg Hx     Macular degeneration Neg Hx     Retinal detachment Neg Hx     Strabismus Neg Hx     Stroke Neg Hx     Thyroid disease Neg Hx        Social  History     Socioeconomic History    Marital status: Single     Spouse name: Not on file    Number of children: Not on file    Years of education: Not on file    Highest education level: Not on file   Occupational History    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, etc   Social Needs    Financial resource strain: Not on file    Food insecurity:     Worry: Not on file     Inability: Not on file    Transportation needs:     Medical: Not on file     Non-medical: Not on file   Tobacco Use    Smoking status: Never Smoker    Smokeless tobacco: Never Used   Substance and Sexual Activity    Alcohol use: No    Drug use: No    Sexual activity: Not on file   Lifestyle    Physical activity:     Days per week: Not on file     Minutes per session: Not on file    Stress: Not on file   Relationships    Social connections:     Talks on phone: Not on file     Gets together: Not on file     Attends Mandaeism service: Not on file     Active member of club or organization: Not on file     Attends meetings of clubs or organizations: Not on file     Relationship status: Not on file   Other Topics Concern    Not on file   Social History Narrative    Not on file       Current Outpatient Medications   Medication Sig Dispense Refill    amLODIPine (NORVASC) 5 MG tablet Take 1 tablet (5 mg total) by mouth once daily. 90 tablet 3    atorvastatin (LIPITOR) 80 MG tablet Take 1 tablet (80 mg total) by mouth once daily. 90 tablet 3    baclofen (LIORESAL) 10 MG tablet Take 1 tablet (10 mg total) by mouth 4 (four) times daily. 120 tablet 5    blood-glucose meter Misc 1 each by Misc.(Non-Drug; Combo Route) route once daily. Pharmacy-whichever brand specified by insurance 1 each 0    cholecalciferol, vitamin D3, (VITAMIN D3) 1,000 unit capsule Take 1 capsule (1,000 Units total) by mouth once daily. 90 capsule 3    clopidogrel (PLAVIX) 75 mg tablet Take 1 tablet (75 mg total) by mouth once daily. 90 tablet 3     "furosemide (LASIX) 20 MG tablet Take 1 tablet (20 mg total) by mouth 2 (two) times daily. 90 tablet 3    lancets (ONETOUCH DELICA LANCETS) 33 gauge Misc 1 lancet by Misc.(Non-Drug; Combo Route) route once daily. ONCE DAILY BLOOD SUGAR TESTING; WHICHEVER BRAND COVERED BY INSURANCE 30 each 11    NOVOLOG FLEXPEN U-100 INSULIN 100 unit/mL (3 mL) InPn pen INJECT 7 UNITS UNDER THE SKIN 3 TIMES DAILY WITH MEALS 15 Syringe 11    pen needle, diabetic 31 gauge x 1/4" Ndle For mealtime insulin 300 each 11    phenytoin (DILANTIN) 100 MG ER capsule Take 1 capsule (100 mg total) by mouth 3 (three) times daily. 270 capsule 3    tamsulosin (FLOMAX) 0.4 mg Cap Take 2 capsules (0.8 mg total) by mouth once daily. 180 capsule 3     No current facility-administered medications for this visit.        Review of patient's allergies indicates:   Allergen Reactions    Penicillins Hives       Review of Systems   Constitution: Negative for chills and fever.   Cardiovascular: Positive for leg swelling. Negative for chest pain and claudication.   Respiratory: Negative for cough and shortness of breath.    Skin: Positive for dry skin and suspicious lesions (corns/callus).   Musculoskeletal: Positive for muscle weakness.   Gastrointestinal: Negative for nausea and vomiting.   Neurological: Positive for focal weakness (right sided), numbness and sensory change. Negative for paresthesias.   Psychiatric/Behavioral: Negative for altered mental status.           Objective:      Physical Exam   Constitutional: He is oriented to person, place, and time. He appears well-developed and well-nourished.   HENT:   Head: Normocephalic.   Cardiovascular: Intact distal pulses.   Pulses:       Dorsalis pedis pulses are 2+ on the right side, and 2+ on the left side.        Posterior tibial pulses are 1+ on the right side, and 1+ on the left side.   CRT < 3 sec to tips of toes. 2+ pitting edema noted to b/l LE. No vericosities noted to b/l LEs.    "   Pulmonary/Chest: No respiratory distress.   Musculoskeletal:   Gastrocnemius equinus noted to b/l ankles with decreased DF noted on exam. MMT 5/5 in DF/PF/Inv/Ev resistance with no reproduction of pain in any direction Right, 3/5 left. Passive range of motion of ankle and pedal joints is painless. Adequate pedal joint ROM.     Rigid hammertoe contractures noted to toes 2-4 R-asymptomatic.     Limited hallux MTPJ ROM with plantarflexion contracture of b/l hallux IPJ, rigid R semi-reducible L.    Neurological: He is alert and oriented to person, place, and time. He has normal strength. A sensory deficit is present.   Light touch, proprioception, and sharp/dull sensation are all intact bilaterally. Protective threshold with the [place]-Wienstein monofilament is intact bilaterally. Vibratory sensation diminished b/l distal foot.      Skin: Skin is warm, dry and intact. Lesion noted. No ecchymosis noted. No erythema.   No open lesions, lacerations or wounds noted. Nails are dystrophic, elongated, thickened with yellow/brown discoloration to R 1 and L 1-5. Remaining toenails normotrophic.  Interdigital spaces clean, dry and intact b/l. No erythema noted to b/l foot. Skin texture thin, shiny, atrophic. Pedal hair absent. Toes cool to touch b/l.        Psychiatric: He has a normal mood and affect. His behavior is normal. Judgment and thought content normal.   Vitals reviewed.            Assessment:       Encounter Diagnoses   Name Primary?    Type II diabetes mellitus with neurological manifestations Yes    Onychomycosis due to dermatophyte     History of stroke          Plan:       Diagnoses and all orders for this visit:    Type II diabetes mellitus with neurological manifestations  -     Routine Foot Care    Onychomycosis due to dermatophyte  -     Routine Foot Care    History of stroke  -     Routine Foot Care      I counseled the patient on his conditions, their implications and medical management.    - Shoe  inspection. Diabetic Foot Education. Patient reminded of the importance of good nutrition and blood sugar control to help prevent podiatric complications of diabetes. Patient instructed on proper foot hygeine. We discussed wearing proper shoe gear, daily foot inspections, never walking without protective shoe gear, never putting sharp instruments to feet, routine podiatric nail visits every 2-3 months.      Discussed regular and routine moisturizer to skin of both feet to help improve dry skin. Advised to apply twice daily until resolution of symptoms. Avoid between toes. Recommended Gold Bond Foot cream or Diabetic cream.      - See routine foot care procedure note for nail debridement        Sabi Douglas DPM

## 2019-09-18 DIAGNOSIS — Z12.11 COLON CANCER SCREENING: Primary | ICD-10-CM

## 2019-10-22 ENCOUNTER — OFFICE VISIT (OUTPATIENT)
Dept: NEPHROLOGY | Facility: CLINIC | Age: 64
End: 2019-10-22
Payer: MEDICARE

## 2019-10-22 ENCOUNTER — LAB VISIT (OUTPATIENT)
Dept: LAB | Facility: HOSPITAL | Age: 64
End: 2019-10-22
Attending: INTERNAL MEDICINE
Payer: MEDICARE

## 2019-10-22 VITALS
HEIGHT: 68 IN | SYSTOLIC BLOOD PRESSURE: 170 MMHG | DIASTOLIC BLOOD PRESSURE: 80 MMHG | OXYGEN SATURATION: 98 % | HEART RATE: 91 BPM | WEIGHT: 210.13 LBS | BODY MASS INDEX: 31.85 KG/M2

## 2019-10-22 DIAGNOSIS — Z79.4 CONTROLLED TYPE 2 DIABETES MELLITUS WITH STAGE 3 CHRONIC KIDNEY DISEASE, WITH LONG-TERM CURRENT USE OF INSULIN: Primary | ICD-10-CM

## 2019-10-22 DIAGNOSIS — N18.30 CONTROLLED TYPE 2 DIABETES MELLITUS WITH STAGE 3 CHRONIC KIDNEY DISEASE, WITH LONG-TERM CURRENT USE OF INSULIN: ICD-10-CM

## 2019-10-22 DIAGNOSIS — N18.30 CONTROLLED TYPE 2 DIABETES MELLITUS WITH STAGE 3 CHRONIC KIDNEY DISEASE, WITH LONG-TERM CURRENT USE OF INSULIN: Primary | ICD-10-CM

## 2019-10-22 DIAGNOSIS — E11.22 CONTROLLED TYPE 2 DIABETES MELLITUS WITH STAGE 3 CHRONIC KIDNEY DISEASE, WITH LONG-TERM CURRENT USE OF INSULIN: ICD-10-CM

## 2019-10-22 DIAGNOSIS — E11.22 CONTROLLED TYPE 2 DIABETES MELLITUS WITH STAGE 3 CHRONIC KIDNEY DISEASE, WITH LONG-TERM CURRENT USE OF INSULIN: Primary | ICD-10-CM

## 2019-10-22 DIAGNOSIS — N39.0 UTI (URINARY TRACT INFECTION), UNCOMPLICATED: ICD-10-CM

## 2019-10-22 DIAGNOSIS — Z79.4 CONTROLLED TYPE 2 DIABETES MELLITUS WITH STAGE 3 CHRONIC KIDNEY DISEASE, WITH LONG-TERM CURRENT USE OF INSULIN: ICD-10-CM

## 2019-10-22 PROCEDURE — 99999 PR PBB SHADOW E&M-EST. PATIENT-LVL III: CPT | Mod: PBBFAC,,, | Performed by: INTERNAL MEDICINE

## 2019-10-22 PROCEDURE — 99999 PR PBB SHADOW E&M-EST. PATIENT-LVL III: ICD-10-PCS | Mod: PBBFAC,,, | Performed by: INTERNAL MEDICINE

## 2019-10-22 PROCEDURE — 99204 OFFICE O/P NEW MOD 45 MIN: CPT | Mod: S$PBB,,, | Performed by: INTERNAL MEDICINE

## 2019-10-22 PROCEDURE — 99204 PR OFFICE/OUTPT VISIT, NEW, LEVL IV, 45-59 MIN: ICD-10-PCS | Mod: S$PBB,,, | Performed by: INTERNAL MEDICINE

## 2019-10-22 PROCEDURE — 99213 OFFICE O/P EST LOW 20 MIN: CPT | Mod: PBBFAC,PO | Performed by: INTERNAL MEDICINE

## 2019-10-22 NOTE — Clinical Note
Please call to reschedule labs that weren't drawn 10.22 (including urine culture, urine p/c ratio), orders still in, thank you.

## 2019-10-22 NOTE — LETTER
October 30, 2019      Raciel Raymond MD  [address]  [facility] [address]           [facility]  [address]  [place] [address]  Phone: [phone]          Patient: Miguel Moore   MR Number: [id number]   YOB: 1955   Date of Visit: 10/22/2019       Dear Dr. Raciel Raymond:    Thank you for referring Miguel Moore to me for evaluation. Attached you will find relevant portions of my assessment and plan of care.    If you have questions, please do not hesitate to call me. I look forward to following Miguel Moore along with you.    Sincerely,    Sanya Roa MD    Enclosure  CC:  No Recipients    If you would like to receive this communication electronically, please contact [email or web address] or [phone] to request more information on [employer] Link access.    For providers and/or their staff who would like to refer a patient to [facility], please contact us through our one-stop-shop provider referral line, [facility], at [phone].    If you feel you have received this communication in error or would no longer like to receive these types of communications, please e-mail [email or web address]

## 2019-10-30 ENCOUNTER — TELEPHONE (OUTPATIENT)
Dept: NEPHROLOGY | Facility: CLINIC | Age: 64
End: 2019-10-30

## 2019-10-30 NOTE — TELEPHONE ENCOUNTER
----- Message from Sanya Roa MD sent at 10/30/2019 10:00 AM CDT -----  Please call to reschedule labs that weren't drawn 10.22 (including urine culture, urine p/c ratio), orders still in, thank you.     done

## 2019-10-30 NOTE — PROGRESS NOTES
Subjective:       Patient ID: Miguel Moore is a 64 y.o. Black or  male who presents for new evaluation of Chronic Kidney Disease    HPI  Mr. Moore is a 64 year old man with medical history of diabetes, hypertension presenting for evaluation of chronic kidney disease.  Patient creatinine 1.9-2.0mg/dL in 2018 (previously 1.6mg/dL), up to 2.3mg/dL in January 2019, most recently 2.9mg/dL September 2019.  Patient reports more adequate fluid intake with sodium restriction, denies any NSAID use.  He reports lower extremity swelling, otherwise denies any fever, chest pain, shortness of breath, abdominal pain, diarrhea, dysuria/hematuria.  He denies any recent acute illness/infections/injury, no recent hospitalizations/ED visits/procedures.  He reports blood sugars well-controlled, blood pressure usually well-controlled.     Review of Systems   Constitutional: Negative for appetite change, fatigue and fever.   Respiratory: Negative for cough and shortness of breath.    Cardiovascular: Positive for leg swelling. Negative for chest pain.   Gastrointestinal: Negative for abdominal pain, constipation, diarrhea, nausea and vomiting.   Genitourinary: Negative for dysuria, flank pain, frequency, hematuria and urgency.   Musculoskeletal: Negative for arthralgias, back pain and joint swelling.   Skin: Negative for rash.   Neurological: Negative for dizziness and light-headedness.   All other systems reviewed and are negative.      Objective:      Physical Exam   Constitutional: He appears well-developed and well-nourished.   Cardiovascular: Normal rate and regular rhythm. Exam reveals no gallop and no friction rub.   No murmur heard.  Pulmonary/Chest: Effort normal and breath sounds normal. No respiratory distress. He has no wheezes. He has no rales.   Musculoskeletal: He exhibits edema (1+ bilateral lower extremity).   Neurological: He is alert.   Skin: Skin is warm and dry. No rash noted.   Vitals reviewed.       Assessment:       1. Controlled type 2 diabetes mellitus with stage 3 chronic kidney disease, with long-term current use of insulin    2. UTI (urinary tract infection), uncomplicated        Plan:     Mr. Moore is a 64 year old man with medical history of diabetes, hypertension presenting for evaluation of chronic kidney disease.  Patient creatinine 1.9-2.0mg/dL in 2018 (previously 1.6mg/dL), up to 2.3mg/dL in January 2019, most recently 2.9mg/dL September 2019.  Suspect patient with acute kidney injury, possible due to volume overload, will increase diuretic x3 days and phone review daily weight; will obtain urine studies to rule out infectious/glomerular etiology (patient previously with nephrotic proteinuria), will obtain renal US with doppler to rule out obstructive/vascular etiology (and in preparation for possible renal biopsy).  Stressed importance of blood pressure/glycemic control to prevent any further progression of kidney disease, patient voiced understanding.  Further recommendations pending results of above.      Return to clinic in 2-3 months pending renal panel, then with renal/heme panel, iron/TIBC/ferritin, urinalysis/culture, urine protein/creatinine ratio, PTH/VitD prior to next visit

## 2019-11-01 ENCOUNTER — LAB VISIT (OUTPATIENT)
Dept: LAB | Facility: HOSPITAL | Age: 64
End: 2019-11-01
Attending: INTERNAL MEDICINE
Payer: MEDICARE

## 2019-11-01 DIAGNOSIS — Z79.4 CONTROLLED TYPE 2 DIABETES MELLITUS WITH STAGE 3 CHRONIC KIDNEY DISEASE, WITH LONG-TERM CURRENT USE OF INSULIN: ICD-10-CM

## 2019-11-01 DIAGNOSIS — E11.22 CONTROLLED TYPE 2 DIABETES MELLITUS WITH STAGE 3 CHRONIC KIDNEY DISEASE, WITH LONG-TERM CURRENT USE OF INSULIN: ICD-10-CM

## 2019-11-01 DIAGNOSIS — N18.30 CONTROLLED TYPE 2 DIABETES MELLITUS WITH STAGE 3 CHRONIC KIDNEY DISEASE, WITH LONG-TERM CURRENT USE OF INSULIN: ICD-10-CM

## 2019-11-01 LAB
25(OH)D3+25(OH)D2 SERPL-MCNC: 5 NG/ML (ref 30–96)
ALBUMIN SERPL BCP-MCNC: 3 G/DL (ref 3.5–5.2)
ANION GAP SERPL CALC-SCNC: 8 MMOL/L (ref 8–16)
BUN SERPL-MCNC: 53 MG/DL (ref 8–23)
CALCIUM SERPL-MCNC: 8.1 MG/DL (ref 8.7–10.5)
CHLORIDE SERPL-SCNC: 113 MMOL/L (ref 95–110)
CO2 SERPL-SCNC: 18 MMOL/L (ref 23–29)
CREAT SERPL-MCNC: 3.2 MG/DL (ref 0.5–1.4)
EST. GFR  (AFRICAN AMERICAN): 22.3 ML/MIN/1.73 M^2
EST. GFR  (NON AFRICAN AMERICAN): 19.3 ML/MIN/1.73 M^2
GLUCOSE SERPL-MCNC: 124 MG/DL (ref 70–110)
PHOSPHATE SERPL-MCNC: 3.7 MG/DL (ref 2.7–4.5)
POTASSIUM SERPL-SCNC: 5.4 MMOL/L (ref 3.5–5.1)
PTH-INTACT SERPL-MCNC: 476 PG/ML (ref 9–77)
SODIUM SERPL-SCNC: 139 MMOL/L (ref 136–145)

## 2019-11-01 PROCEDURE — 82306 VITAMIN D 25 HYDROXY: CPT

## 2019-11-01 PROCEDURE — 36415 COLL VENOUS BLD VENIPUNCTURE: CPT | Mod: PO

## 2019-11-01 PROCEDURE — 80069 RENAL FUNCTION PANEL: CPT

## 2019-11-01 PROCEDURE — 83970 ASSAY OF PARATHORMONE: CPT

## 2019-11-01 PROCEDURE — 86038 ANTINUCLEAR ANTIBODIES: CPT

## 2019-11-04 LAB — ANA SER QL IF: NORMAL

## 2019-11-05 LAB
PHOSPHOLIPASE A2 RECEPTOR, ELISA: 3 RU/ML
PHOSPHOLIPASE A2 RECEPTOR, IFA: NEGATIVE

## 2019-11-06 ENCOUNTER — TELEPHONE (OUTPATIENT)
Dept: FAMILY MEDICINE | Facility: CLINIC | Age: 64
End: 2019-11-06

## 2019-11-06 NOTE — TELEPHONE ENCOUNTER
----- Message from Bharti Damico sent at 11/4/2019 12:14 PM CST -----  Regarding: colonoscopy  Dear Dr. Raymond,    We have been trying to reach your patient to schedule their colonoscopy  you have placed for them.  A letter has also been sent to  schedule as well as several calls. Please reach out to your patient regarding scheduling their procedure.  He no showed his appointment with us.      Sincerely,  [facility]   [phone]

## 2019-11-06 NOTE — TELEPHONE ENCOUNTER
Called pt. Regarding scheduling there colonoscopy.  phone number given to scheduling department. Pt. Would like an appt. Dec/Joshua

## 2019-11-29 ENCOUNTER — TELEPHONE (OUTPATIENT)
Dept: PODIATRY | Facility: CLINIC | Age: 64
End: 2019-11-29

## 2019-12-16 ENCOUNTER — PATIENT OUTREACH (OUTPATIENT)
Dept: ADMINISTRATIVE | Facility: OTHER | Age: 64
End: 2019-12-16

## 2019-12-19 DIAGNOSIS — Z86.73 HISTORY OF STROKE: ICD-10-CM

## 2019-12-19 DIAGNOSIS — I69.351 HEMIPLEGIA AND HEMIPARESIS FOLLOWING CEREBRAL INFARCTION AFFECTING RIGHT DOMINANT SIDE: Chronic | ICD-10-CM

## 2019-12-19 RX ORDER — BACLOFEN 10 MG/1
TABLET ORAL
Qty: 120 TABLET | Refills: 1 | Status: SHIPPED | OUTPATIENT
Start: 2019-12-19 | End: 2020-04-17

## 2020-01-06 ENCOUNTER — PATIENT OUTREACH (OUTPATIENT)
Dept: ADMINISTRATIVE | Facility: OTHER | Age: 65
End: 2020-01-06

## 2020-01-09 ENCOUNTER — OFFICE VISIT (OUTPATIENT)
Dept: PODIATRY | Facility: CLINIC | Age: 65
End: 2020-01-09
Payer: MEDICARE

## 2020-01-09 VITALS
HEART RATE: 83 BPM | SYSTOLIC BLOOD PRESSURE: 162 MMHG | DIASTOLIC BLOOD PRESSURE: 89 MMHG | WEIGHT: 210.13 LBS | HEIGHT: 68 IN | BODY MASS INDEX: 31.85 KG/M2

## 2020-01-09 DIAGNOSIS — B35.1 ONYCHOMYCOSIS DUE TO DERMATOPHYTE: ICD-10-CM

## 2020-01-09 DIAGNOSIS — E11.49 TYPE II DIABETES MELLITUS WITH NEUROLOGICAL MANIFESTATIONS: Primary | ICD-10-CM

## 2020-01-09 PROCEDURE — 11721 PR DEBRIDEMENT OF NAILS, 6 OR MORE: ICD-10-PCS | Mod: Q9,S$PBB,, | Performed by: PODIATRIST

## 2020-01-09 PROCEDURE — 99999 PR PBB SHADOW E&M-EST. PATIENT-LVL III: CPT | Mod: PBBFAC,,, | Performed by: PODIATRIST

## 2020-01-09 PROCEDURE — 11721 DEBRIDE NAIL 6 OR MORE: CPT | Mod: Q9,S$PBB,, | Performed by: PODIATRIST

## 2020-01-09 PROCEDURE — 11721 DEBRIDE NAIL 6 OR MORE: CPT | Mod: PBBFAC,PO | Performed by: PODIATRIST

## 2020-01-09 PROCEDURE — 99499 NO LOS: ICD-10-PCS | Mod: S$PBB,,, | Performed by: PODIATRIST

## 2020-01-09 PROCEDURE — 99499 UNLISTED E&M SERVICE: CPT | Mod: S$PBB,,, | Performed by: PODIATRIST

## 2020-01-09 PROCEDURE — 99999 PR PBB SHADOW E&M-EST. PATIENT-LVL III: ICD-10-PCS | Mod: PBBFAC,,, | Performed by: PODIATRIST

## 2020-01-09 PROCEDURE — 99213 OFFICE O/P EST LOW 20 MIN: CPT | Mod: PBBFAC,PO | Performed by: PODIATRIST

## 2020-01-12 NOTE — PROGRESS NOTES
Subjective:      Patient ID: Miguel Moore is a 64 y.o. male.    Chief Complaint: Diabetes Mellitus ( 9/9/19 Dair ) and Nail Care    Miguel is a 64 y.o. male who presents to the clinic for evaluation and treatment of high risk feet. 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 The patient's chief complaint is long, thick toenails on both feet and calluses on toes of left foot. Routine trimming of these help keep symptoms under control. This patient has documented high risk feet requiring routine maintenance secondary to diabetes mellitis and those secondary complications of diabetes, as mentioned. No other major complaints today. Has hx of stroke and right sided weakness.Presents for diabetic foot risk assessment. No new issues.    PCP: Raciel Raymond MD    Date Last Seen by PCP:   Chief Complaint   Patient presents with    Diabetes Mellitus      9/9/19 Dair     Nail Care         Current shoe gear:   Tennis shoes         Hemoglobin A1C   Date Value Ref Range Status   09/09/2019 6.1 (H) 4.0 - 5.6 % Final     Comment:     ADA Screening Guidelines:  5.7-6.4%  Consistent with prediabetes  >or=6.5%  Consistent with diabetes  High levels of fetal hemoglobin interfere with the HbA1C  assay. Heterozygous hemoglobin variants (HbS, HgC, etc)do  not significantly interfere with this assay.   However, presence of multiple variants may affect accuracy.     01/04/2019 6.3 (H) 4.0 - 5.6 % Final     Comment:     ADA Screening Guidelines:  5.7-6.4%  Consistent with prediabetes  >or=6.5%  Consistent with diabetes  High levels of fetal hemoglobin interfere with the HbA1C  assay. Heterozygous hemoglobin variants (HbS, HgC, etc)do  not significantly interfere with this assay.   However, presence of multiple variants may affect accuracy.     07/25/2018 6.0 (H) 4.0 - 5.6 % Final     Comment:     ADA Screening  Guidelines:  5.7-6.4%  Consistent with prediabetes  >or=6.5%  Consistent with diabetes  High levels of fetal hemoglobin interfere with the HbA1C  assay. Heterozygous hemoglobin variants (HbS, HgC, etc)do  not significantly interfere with this assay.   However, presence of multiple variants may affect accuracy.       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 Tay    CATARACT EXTRACTION W/  INTRAOCULAR LENS IMPLANT Left 10/19/2017    Dr. Tay    FEMORAL ARTERY STENT      KNEE SURGERY      bilateral scope       Family History   Problem Relation Age of Onset    Diabetes Mother     Heart disease Mother     Hypertension Mother     Cataracts Mother     No Known Problems Father     Hypertension Sister     No Known Problems Brother     No Known Problems Daughter     No Known Problems Maternal Aunt     No Known Problems Maternal Uncle     No Known Problems Paternal Aunt     No Known Problems Paternal Uncle     No Known Problems Maternal Grandmother     No Known Problems Maternal Grandfather     No Known Problems Paternal Grandmother     No Known Problems Paternal Grandfather     Amblyopia Neg Hx     Blindness Neg Hx     Cancer Neg Hx     Glaucoma Neg Hx     Macular degeneration Neg Hx     Retinal detachment Neg Hx     Strabismus Neg Hx     Stroke Neg Hx     Thyroid disease Neg Hx        Social History     Socioeconomic History    Marital status: Single     Spouse name: Not on file    Number of children: Not on file    Years of education: Not on file    Highest education level: Not on file   Occupational History    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, etc   Social Needs    Financial resource strain:  Not on file    Food insecurity:     Worry: Not on file     Inability: Not on file    Transportation needs:     Medical: Not on file     Non-medical: Not on file   Tobacco Use    Smoking status: Never Smoker    Smokeless tobacco: Never Used   Substance and Sexual Activity    Alcohol use: No    Drug use: No    Sexual activity: Not on file   Lifestyle    Physical activity:     Days per week: Not on file     Minutes per session: Not on file    Stress: Not on file   Relationships    Social connections:     Talks on phone: Not on file     Gets together: Not on file     Attends [employer] service: Not on file     Active member of club or organization: Not on file     Attends meetings of clubs or organizations: Not on file     Relationship status: Not on file   Other Topics Concern    Not on file   Social History Narrative    Not on file       Current Outpatient Medications   Medication Sig Dispense Refill    amLODIPine (NORVASC) 5 MG tablet Take 1 tablet (5 mg total) by mouth once daily. 90 tablet 3    atorvastatin (LIPITOR) 80 MG tablet Take 1 tablet (80 mg total) by mouth once daily. 90 tablet 3    baclofen (LIORESAL) 10 MG tablet TAKE 1 TABLET BY MOUTH 4 TIMES DAILY 120 tablet 1    blood-glucose meter Misc 1 each by Misc.(Non-Drug; Combo Route) route once daily. Pharmacy-whichever brand specified by insurance 1 each 0    cholecalciferol, vitamin D3, (VITAMIN D3) 1,000 unit capsule Take 1 capsule (1,000 Units total) by mouth once daily. 90 capsule 3    clopidogrel (PLAVIX) 75 mg tablet Take 1 tablet (75 mg total) by mouth once daily. 90 tablet 3    furosemide (LASIX) 20 MG tablet Take 1 tablet (20 mg total) by mouth 2 (two) times daily. 90 tablet 3    lancets (ONETOUCH DELICA LANCETS) 33 gauge Misc 1 lancet by Misc.(Non-Drug; Combo Route) route once daily. ONCE DAILY BLOOD SUGAR TESTING; [employer]IC[employer] BRAND COVERED BY INSURANCE 30 each 11    NOVOLOG FLEXPEN U-100 INSULIN 100 unit/mL (3 mL) InPn pen INJECT  "7 UNITS UNDER THE SKIN 3 TIMES DAILY WITH MEALS 15 Syringe 11    pen needle, diabetic 31 gauge x 1/4" Ndle For mealtime insulin 300 each 11    phenytoin (DILANTIN) 100 MG ER capsule Take 1 capsule (100 mg total) by mouth 3 (three) times daily. 270 capsule 3    tamsulosin (FLOMAX) 0.4 mg Cap Take 2 capsules (0.8 mg total) by mouth once daily. 180 capsule 3     No current facility-administered medications for this visit.        Review of patient's allergies indicates:   Allergen Reactions    Penicillins Hives       Review of Systems   Constitution: Negative for chills and fever.   Cardiovascular: Positive for leg swelling. Negative for chest pain and claudication.   Respiratory: Negative for cough and shortness of breath.    Skin: Positive for dry skin and suspicious lesions (corns/callus).   Musculoskeletal: Positive for muscle weakness.   Gastrointestinal: Negative for nausea and vomiting.   Neurological: Positive for focal weakness (right sided), numbness and sensory change. Negative for paresthesias.   Psychiatric/Behavioral: Negative for altered mental status.           Objective:      Physical Exam   Constitutional: He is oriented to person, place, and time. He appears well-developed and well-nourished.   HENT:   Head: Normocephalic.   Cardiovascular: Intact distal pulses.   Pulses:       Dorsalis pedis pulses are 2+ on the right side, and 2+ on the left side.        Posterior tibial pulses are 1+ on the right side, and 1+ on the left side.   CRT < 3 sec to tips of toes. 2+ pitting edema noted to b/l LE. No vericosities noted to b/l LEs.      Pulmonary/Chest: No respiratory distress.   Musculoskeletal:   Gastrocnemius equinus noted to b/l ankles with decreased DF noted on exam. MMT 5/5 in DF/PF/Inv/Ev resistance with no reproduction of pain in any direction Right, 3/5 left. Passive range of motion of ankle and pedal joints is painless. Adequate pedal joint ROM.     Rigid hammertoe contractures noted to toes " 2-4 R-asymptomatic.     Limited hallux MTPJ ROM with plantarflexion contracture of b/l hallux IPJ, rigid R semi-reducible L.    Neurological: He is alert and oriented to person, place, and time. He has normal strength. A sensory deficit is present.   Light touch, proprioception, and sharp/dull sensation are all intact bilaterally. Protective threshold with the Ruby-Wienstein monofilament is intact bilaterally. Vibratory sensation diminished b/l distal foot.      Skin: Skin is warm, dry and intact. Lesion noted. No ecchymosis noted. No erythema.   No open lesions, lacerations or wounds noted. Nails are dystrophic, elongated, thickened with yellow/brown discoloration to R 1 and L 1-5. Remaining toenails normotrophic.  Interdigital spaces clean, dry and intact b/l. No erythema noted to b/l foot. Skin texture thin, shiny, atrophic. Pedal hair absent. Toes cool to touch b/l.        Psychiatric: He has a normal mood and affect. His behavior is normal. Judgment and thought content normal.   Vitals reviewed.            Assessment:       Encounter Diagnoses   Name Primary?    Type II diabetes mellitus with neurological manifestations Yes    Onychomycosis due to dermatophyte          Plan:       Miguel was seen today for diabetes mellitus and nail care.    Diagnoses and all orders for this visit:    Type II diabetes mellitus with neurological manifestations    Onychomycosis due to dermatophyte      I counseled the patient on his conditions, their implications and medical management.    - Shoe inspection. Diabetic Foot Education. Patient reminded of the importance of good nutrition and blood sugar control to help prevent podiatric complications of diabetes. Patient instructed on proper foot hygeine. We discussed wearing proper shoe gear, daily foot inspections, never walking without protective shoe gear, never putting sharp instruments to feet, routine podiatric nail visits every 2-3 months.      Discussed regular and  routine moisturizer to skin of both feet to help improve dry skin. Advised to apply twice daily until resolution of symptoms. Avoid between toes. Recommended Gold Bond Foot cream or Diabetic cream.      - With patient's permission, nails were aggressively reduced and debrided x 10 to their soft tissue attachment mechanically and with electric , removing all offending nail and debris. Patient relates relief following the procedure. He will continue to monitor the areas daily, inspect his feet, wear protective shoe gear when ambulatory, moisturizer to maintain skin integrity and follow in this office in approximately 2-3 months, sooner p.r.n.       Sabi Douglas DPM

## 2020-01-16 ENCOUNTER — CLINICAL SUPPORT (OUTPATIENT)
Dept: FAMILY MEDICINE | Facility: CLINIC | Age: 65
End: 2020-01-16
Payer: MEDICARE

## 2020-01-16 VITALS — SYSTOLIC BLOOD PRESSURE: 170 MMHG | OXYGEN SATURATION: 99 % | DIASTOLIC BLOOD PRESSURE: 88 MMHG | HEART RATE: 63 BPM

## 2020-01-16 DIAGNOSIS — I10 ESSENTIAL HYPERTENSION: Primary | ICD-10-CM

## 2020-01-16 PROCEDURE — 99499 UNLISTED E&M SERVICE: CPT | Mod: S$PBB,,, | Performed by: INTERNAL MEDICINE

## 2020-01-16 PROCEDURE — 99999 PR PBB SHADOW E&M-EST. PATIENT-LVL II: ICD-10-PCS | Mod: PBBFAC,,,

## 2020-01-16 PROCEDURE — 99212 OFFICE O/P EST SF 10 MIN: CPT | Mod: PBBFAC,PO

## 2020-01-16 PROCEDURE — 99499 NO LOS: ICD-10-PCS | Mod: S$PBB,,, | Performed by: INTERNAL MEDICINE

## 2020-01-16 PROCEDURE — 99999 PR PBB SHADOW E&M-EST. PATIENT-LVL II: CPT | Mod: PBBFAC,,,

## 2020-01-16 NOTE — PROGRESS NOTES
"Miguel Moore 64 y.o. male is here today for Blood Pressure check.   History of HTN yes.    Review of patient's allergies indicates:   Allergen Reactions    Ace inhibitors      Hyperkalemia 8/2018    Penicillins Hives     Creatinine   Date Value Ref Range Status   11/01/2019 3.2 (H) 0.5 - 1.4 mg/dL Final     Sodium   Date Value Ref Range Status   11/01/2019 139 136 - 145 mmol/L Final     Potassium   Date Value Ref Range Status   11/01/2019 5.4 (H) 3.5 - 5.1 mmol/L Final     Comment:     *No Visible Hemolysis   ]  Patient verifies taking blood pressure medications on a regular basis at the same time of the day.     Current Outpatient Medications:     amLODIPine (NORVASC) 5 MG tablet, Take 1 tablet (5 mg total) by mouth once daily., Disp: 90 tablet, Rfl: 3    atorvastatin (LIPITOR) 80 MG tablet, Take 1 tablet (80 mg total) by mouth once daily., Disp: 90 tablet, Rfl: 3    baclofen (LIORESAL) 10 MG tablet, TAKE 1 TABLET BY MOUTH 4 TIMES DAILY, Disp: 120 tablet, Rfl: 1    cholecalciferol, vitamin D3, (VITAMIN D3) 1,000 unit capsule, Take 1 capsule (1,000 Units total) by mouth once daily., Disp: 90 capsule, Rfl: 3    clopidogrel (PLAVIX) 75 mg tablet, Take 1 tablet (75 mg total) by mouth once daily., Disp: 90 tablet, Rfl: 3    furosemide (LASIX) 20 MG tablet, Take 1 tablet (20 mg total) by mouth 2 (two) times daily., Disp: 90 tablet, Rfl: 3    NOVOLOG FLEXPEN U-100 INSULIN 100 unit/mL (3 mL) InPn pen, INJECT 7 UNITS UNDER THE SKIN 3 TIMES DAILY WITH MEALS, Disp: 15 Syringe, Rfl: 11    pen needle, diabetic 31 gauge x 1/4" Ndle, For mealtime insulin, Disp: 300 each, Rfl: 11    phenytoin (DILANTIN) 100 MG ER capsule, Take 1 capsule (100 mg total) by mouth 3 (three) times daily., Disp: 270 capsule, Rfl: 3    tamsulosin (FLOMAX) 0.4 mg Cap, Take 2 capsules (0.8 mg total) by mouth once daily., Disp: 180 capsule, Rfl: 3    blood-glucose meter Misc, 1 each by Misc.(Non-Drug; Combo Route) route once daily. " Pharmacy-whichever brand specified by insurance, Disp: 1 each, Rfl: 0    lancets (ONETOUCH DELICA LANCETS) 33 gauge Misc, 1 lancet by Misc.(Non-Drug; Combo Route) route once daily. ONCE DAILY BLOOD SUGAR TESTING; [employer] BRAND COVERED BY INSURANCE, Disp: 30 each, Rfl: 11  Does patient have record of home blood pressure readings no.  Last dose of blood pressure medication was taken at 8 am.  Patient is asymptomatic.   Complains of none.    Vitals:    01/16/20 1147 01/16/20 1210   BP: (!) 180/82 (!) 170/88   BP Location: Left arm Left arm   Patient Position: Sitting Sitting   BP Method: Medium (Manual) Medium (Manual)   Pulse: 63    SpO2: 99%        Appointment with PCP scheduled for tomorrow.       Dr. Raciel Raymond notified.

## 2020-01-17 ENCOUNTER — LAB VISIT (OUTPATIENT)
Dept: LAB | Facility: HOSPITAL | Age: 65
End: 2020-01-17
Attending: INTERNAL MEDICINE
Payer: MEDICARE

## 2020-01-17 ENCOUNTER — OFFICE VISIT (OUTPATIENT)
Dept: FAMILY MEDICINE | Facility: CLINIC | Age: 65
End: 2020-01-17
Payer: MEDICARE

## 2020-01-17 ENCOUNTER — CLINICAL SUPPORT (OUTPATIENT)
Dept: OPHTHALMOLOGY | Facility: CLINIC | Age: 65
End: 2020-01-17
Attending: INTERNAL MEDICINE
Payer: MEDICARE

## 2020-01-17 VITALS
OXYGEN SATURATION: 99 % | SYSTOLIC BLOOD PRESSURE: 146 MMHG | HEIGHT: 68 IN | HEART RATE: 71 BPM | DIASTOLIC BLOOD PRESSURE: 76 MMHG | BODY MASS INDEX: 31.37 KG/M2 | TEMPERATURE: 99 F | WEIGHT: 207 LBS

## 2020-01-17 DIAGNOSIS — E11.22 CONTROLLED TYPE 2 DIABETES MELLITUS WITH STAGE 3 CHRONIC KIDNEY DISEASE, WITH LONG-TERM CURRENT USE OF INSULIN: Primary | Chronic | ICD-10-CM

## 2020-01-17 DIAGNOSIS — E11.22 CONTROLLED TYPE 2 DIABETES MELLITUS WITH STAGE 3 CHRONIC KIDNEY DISEASE, WITH LONG-TERM CURRENT USE OF INSULIN: ICD-10-CM

## 2020-01-17 DIAGNOSIS — R53.1 RIGHT SIDED WEAKNESS: ICD-10-CM

## 2020-01-17 DIAGNOSIS — I69.351 HEMIPLEGIA AND HEMIPARESIS FOLLOWING CEREBRAL INFARCTION AFFECTING RIGHT DOMINANT SIDE: Chronic | ICD-10-CM

## 2020-01-17 DIAGNOSIS — I69.398 SEIZURE DISORDER AS SEQUELA OF CEREBROVASCULAR ACCIDENT: Chronic | ICD-10-CM

## 2020-01-17 DIAGNOSIS — I10 BENIGN ESSENTIAL HTN: Chronic | ICD-10-CM

## 2020-01-17 DIAGNOSIS — N18.4 CKD (CHRONIC KIDNEY DISEASE) STAGE 4, GFR 15-29 ML/MIN: ICD-10-CM

## 2020-01-17 DIAGNOSIS — E78.5 HYPERLIPIDEMIA, UNSPECIFIED HYPERLIPIDEMIA TYPE: ICD-10-CM

## 2020-01-17 DIAGNOSIS — G40.909 SEIZURE DISORDER AS SEQUELA OF CEREBROVASCULAR ACCIDENT: Chronic | ICD-10-CM

## 2020-01-17 DIAGNOSIS — Z79.4 CONTROLLED TYPE 2 DIABETES MELLITUS WITH STAGE 3 CHRONIC KIDNEY DISEASE, WITH LONG-TERM CURRENT USE OF INSULIN: Primary | Chronic | ICD-10-CM

## 2020-01-17 DIAGNOSIS — N18.30 CONTROLLED TYPE 2 DIABETES MELLITUS WITH STAGE 3 CHRONIC KIDNEY DISEASE, WITH LONG-TERM CURRENT USE OF INSULIN: ICD-10-CM

## 2020-01-17 DIAGNOSIS — Z23 NEEDS FLU SHOT: ICD-10-CM

## 2020-01-17 DIAGNOSIS — N18.30 CONTROLLED TYPE 2 DIABETES MELLITUS WITH STAGE 3 CHRONIC KIDNEY DISEASE, WITH LONG-TERM CURRENT USE OF INSULIN: Chronic | ICD-10-CM

## 2020-01-17 DIAGNOSIS — Z79.4 CONTROLLED TYPE 2 DIABETES MELLITUS WITH STAGE 3 CHRONIC KIDNEY DISEASE, WITH LONG-TERM CURRENT USE OF INSULIN: Chronic | ICD-10-CM

## 2020-01-17 DIAGNOSIS — N25.81 SECONDARY HYPERPARATHYROIDISM OF RENAL ORIGIN: ICD-10-CM

## 2020-01-17 DIAGNOSIS — N18.30 CONTROLLED TYPE 2 DIABETES MELLITUS WITH STAGE 3 CHRONIC KIDNEY DISEASE, WITH LONG-TERM CURRENT USE OF INSULIN: Primary | Chronic | ICD-10-CM

## 2020-01-17 DIAGNOSIS — Z79.4 CONTROLLED TYPE 2 DIABETES MELLITUS WITH STAGE 3 CHRONIC KIDNEY DISEASE, WITH LONG-TERM CURRENT USE OF INSULIN: ICD-10-CM

## 2020-01-17 DIAGNOSIS — E11.22 CONTROLLED TYPE 2 DIABETES MELLITUS WITH STAGE 3 CHRONIC KIDNEY DISEASE, WITH LONG-TERM CURRENT USE OF INSULIN: Chronic | ICD-10-CM

## 2020-01-17 PROCEDURE — 92250 FUNDUS PHOTOGRAPHY W/I&R: CPT | Mod: 26,S$PBB,, | Performed by: OPHTHALMOLOGY

## 2020-01-17 PROCEDURE — 99999 PR PBB SHADOW E&M-EST. PATIENT-LVL IV: ICD-10-PCS | Mod: PBBFAC,,, | Performed by: INTERNAL MEDICINE

## 2020-01-17 PROCEDURE — 92250 DIABETIC EYE SCREENING PHOTO: ICD-10-PCS | Mod: 26,S$PBB,, | Performed by: OPHTHALMOLOGY

## 2020-01-17 PROCEDURE — 99999 PR PBB SHADOW E&M-EST. PATIENT-LVL IV: CPT | Mod: PBBFAC,,, | Performed by: INTERNAL MEDICINE

## 2020-01-17 PROCEDURE — 92250 FUNDUS PHOTOGRAPHY W/I&R: CPT | Mod: PBBFAC,PO

## 2020-01-17 PROCEDURE — 2022F DILAT RTA XM EVC RTNOPTHY: CPT | Mod: S$PBB,,, | Performed by: OPHTHALMOLOGY

## 2020-01-17 PROCEDURE — 80061 LIPID PANEL: CPT

## 2020-01-17 PROCEDURE — 80053 COMPREHEN METABOLIC PANEL: CPT

## 2020-01-17 PROCEDURE — 99214 OFFICE O/P EST MOD 30 MIN: CPT | Mod: S$PBB,,, | Performed by: INTERNAL MEDICINE

## 2020-01-17 PROCEDURE — 83036 HEMOGLOBIN GLYCOSYLATED A1C: CPT

## 2020-01-17 PROCEDURE — 99214 PR OFFICE/OUTPT VISIT, EST, LEVL IV, 30-39 MIN: ICD-10-PCS | Mod: S$PBB,,, | Performed by: INTERNAL MEDICINE

## 2020-01-17 PROCEDURE — 2022F DIABETIC EYE SCREENING PHOTO: ICD-10-PCS | Mod: S$PBB,,, | Performed by: OPHTHALMOLOGY

## 2020-01-17 PROCEDURE — 99214 OFFICE O/P EST MOD 30 MIN: CPT | Mod: PBBFAC,PO | Performed by: INTERNAL MEDICINE

## 2020-01-17 PROCEDURE — 80185 ASSAY OF PHENYTOIN TOTAL: CPT

## 2020-01-17 PROCEDURE — 90662 IIV NO PRSV INCREASED AG IM: CPT | Mod: PBBFAC,PO

## 2020-01-17 PROCEDURE — 36415 COLL VENOUS BLD VENIPUNCTURE: CPT | Mod: PO

## 2020-01-17 RX ORDER — ATORVASTATIN CALCIUM 80 MG/1
80 TABLET, FILM COATED ORAL DAILY
Qty: 90 TABLET | Refills: 3 | Status: SHIPPED | OUTPATIENT
Start: 2020-01-17 | End: 2020-01-19 | Stop reason: SDUPTHER

## 2020-01-17 RX ORDER — ATORVASTATIN CALCIUM 80 MG/1
80 TABLET, FILM COATED ORAL DAILY
Qty: 90 TABLET | Refills: 3 | Status: SHIPPED | OUTPATIENT
Start: 2020-01-17 | End: 2020-01-17 | Stop reason: SDUPTHER

## 2020-01-17 RX ORDER — AMLODIPINE BESYLATE 5 MG/1
5 TABLET ORAL DAILY
Qty: 90 TABLET | Refills: 3 | Status: CANCELLED | OUTPATIENT
Start: 2020-01-17 | End: 2021-01-16

## 2020-01-17 RX ORDER — AMLODIPINE BESYLATE 10 MG/1
10 TABLET ORAL DAILY
Qty: 90 TABLET | Refills: 3 | Status: SHIPPED | OUTPATIENT
Start: 2020-01-17 | End: 2020-07-31 | Stop reason: SDUPTHER

## 2020-01-17 NOTE — PROGRESS NOTES
This note was created by combination of typed  and M-Modal dictation.  Transcription errors may be present.  If there are any questions, please contact me.    Assessment & Plan:   Controlled type 2 diabetes mellitus with stage 3 chronic kidney disease, with long-term current use of insulin  CKD stage 4  Secondary hyperPTH  -last A1c was good.  Repeat A1c today.  On mealtime insulin NovoLog.  7 units with meals.  Denies episodes of hypoglycemia.  Needs eye exam.  Status post cataract surgery. Eye photo ordered.  Fasting today, check CMP and lipid  Kidney disease followed by Nephrology.  Had previously had renal Dopplers ordered and needs to reschedule that.  I have given him the contact information for radiology department to get that done.  -     Comprehensive metabolic panel; Future; Expected date: 04/15/2020  -     Lipid panel; Future; Expected date: 04/15/2020  -     Hemoglobin A1c; Future; Expected date: 04/15/2020  -     Diabetic Eye Screening Photo; Future    Benign essential HTN  -not to goal.  On low-dose Lasix, mid range dose of amlodipine.  Not on Ace inhibitor and ARB, fluctuating creatinine at this time.  Increase the amlodipine.  He is aware of the dietary limitations on sodium.  -     amLODIPine (NORVASC) 10 MG tablet; Take 1 tablet (10 mg total) by mouth once daily.  Dispense: 90 tablet; Refill: 3    Hyperlipidemia, unspecified hyperlipidemia type  -refilled atorvastatin 80  -     Discontinue: atorvastatin (LIPITOR) 80 MG tablet; Take 1 tablet (80 mg total) by mouth once daily.  Dispense: 90 tablet; Refill: 3  -     atorvastatin (LIPITOR) 80 MG tablet; Take 1 tablet (80 mg total) by mouth once daily.  Dispense: 90 tablet; Refill: 3    Right sided weakness  Hemiplegia after CVA  -had previously been referred for evaluation for motorized wheelchair but has not been scheduled.  I have resubmitted referral.  -     Ambulatory referral to Physical Medicine Rehab    Needs flu shot  -     Influenza  - High Dose (65+) (PF) (IM)    Seizure disorder after CVA  -stable on dilantin check level    Medications Discontinued During This Encounter   Medication Reason    atorvastatin (LIPITOR) 80 MG tablet Reorder    atorvastatin (LIPITOR) 80 MG tablet Reorder    amLODIPine (NORVASC) 5 MG tablet        meds sent this encounter:  Medications Ordered This Encounter   Medications    amLODIPine (NORVASC) 10 MG tablet     Sig: Take 1 tablet (10 mg total) by mouth once daily.     Dispense:  90 tablet     Refill:  3     Increased dose    atorvastatin (LIPITOR) 80 MG tablet     Sig: Take 1 tablet (80 mg total) by mouth once daily.     Dispense:  90 tablet     Refill:  3             Follow Up: No follow-ups on file. OV 3 months.     Subjective:     Chief Complaint   Patient presents with    Hypertension       HPI  Miguel is a 64 y.o. male, last appointment with this clinic was 1/16/2020.    Last visit referred for colonoscopy  Saw nephrology. Monitor.  Vit d very low 11/19  pth high  9/19 a1c 6.1    Notes BP has been high in the past week.  Possible more sodium intake. High stress.  Has made some recent diet modification - more salads. Home cooking. No canned goods.     BP high on intake, better on repeat.    Still taking insulin 7 units with meals. BID glc checks. By recall 120s. No recent hypoglycemic episodes. estimates that this might happen every month or 2.    Fasting today. It's 1 PM. Got out of bed 5 AM. Usually does not eat until after 12 PM.     He missed appt for renal doppler US.  I have given him contact information for radiology department to schedule this.    He is moving and needs a power scooter. He had previously discussed this with me last fall. I will refer to [facility] again for motorized wheelchair evaluation    Patient Care Team:  Raciel Raymond MD as PCP - General (Internal Medicine)  Gabriella Manjarrez DPM as Consulting Physician (Podiatry)  Mac Chambers Jr., MD as Consulting Physician (Urology)  Geovany DENT  MD Chaim as Consulting Physician (Neurology)  Bob aTy MD as Consulting Physician (Ophthalmology)  Tres Reyna MA as Care Coordinator    Patient Active Problem List    Diagnosis Date Noted    Type 2 diabetes mellitus with diabetic neuropathy, with long-term current use of insulin 05/10/2018    History of stroke 12/04/2017    CME (cystoid macular edema), bilateral 11/16/2017    Refractive error 11/16/2017    Post-operative state 10/06/2017    Senile nuclear sclerosis 10/05/2017    Right sided weakness 09/11/2017    Secondary hyperparathyroidism of renal origin 08/03/2017    Vitamin D deficiency 08/03/2017    Impaired functional mobility, balance, gait, and endurance 06/23/2017    Nuclear sclerosis, left 06/09/2017    Cortical cataract of both eyes 06/09/2017    DM type 2 without retinopathy 06/09/2017    Microcytosis 03/22/2017     Seen on admission as well 2015; normal Hb, low MCV.  Normal RDW      CKD (chronic kidney disease) stage 4, GFR 15-29 ml/min with proteinuria 03/22/2017    Benign essential HTN; ACEI hyperK 03/21/2017    Pure hypercholesterolemia 03/21/2017    Controlled type 2 diabetes mellitus with stage 3 chronic kidney disease, with long-term current use of insulin 03/21/2017    Benign non-nodular prostatic hyperplasia without lower urinary tract symptoms 03/21/2017 6/26/2017 renal US mod prostatomegaly.      Seizure disorder as sequela of cerebrovascular accident 03/21/2017    Hemiplegia and hemiparesis following cerebral infarction affecting right dominant side 03/21/2017       PAST MEDICAL HISTORY:  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    Stroke     Urinary tract infection        PAST SURGICAL HISTORY:  Past Surgical History:   Procedure Laterality Date    CATARACT EXTRACTION W/  INTRAOCULAR LENS IMPLANT Right 10/05/2017    Dr. Tay    CATARACT  EXTRACTION W/  INTRAOCULAR LENS IMPLANT Left 10/19/2017    Dr. Tay    FEMORAL ARTERY STENT      KNEE SURGERY      bilateral scope       SOCIAL HISTORY:  Social History     Socioeconomic History    Marital status: Single     Spouse name: Not on file    Number of children: Not on file    Years of education: Not on file    Highest education level: Not on file   Occupational History    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, etc   Social Needs    Financial resource strain: Not on file    Food insecurity:     Worry: Not on file     Inability: Not on file    Transportation needs:     Medical: Not on file     Non-medical: Not on file   Tobacco Use    Smoking status: Never Smoker    Smokeless tobacco: Never Used   Substance and Sexual Activity    Alcohol use: No    Drug use: No    Sexual activity: Not on file   Lifestyle    Physical activity:     Days per week: Not on file     Minutes per session: Not on file    Stress: Not on file   Relationships    Social connections:     Talks on phone: Not on file     Gets together: Not on file     Attends Spiritism service: Not on file     Active member of club or organization: Not on file     Attends meetings of clubs or organizations: Not on file     Relationship status: Not on file   Other Topics Concern    Not on file   Social History Narrative    Not on file       ALLERGIES AND MEDICATIONS: updated and reviewed.  Review of patient's allergies indicates:   Allergen Reactions    Ace inhibitors      Hyperkalemia 8/2018    Penicillins Hives     Current Outpatient Medications   Medication Sig Dispense Refill    amLODIPine (NORVASC) 5 MG tablet Take 1 tablet (5 mg total) by mouth once daily. 90 tablet 3    atorvastatin (LIPITOR) 80 MG tablet Take 1 tablet (80 mg total) by mouth once daily. 90 tablet 3    baclofen (LIORESAL) 10 MG tablet TAKE 1 TABLET BY MOUTH 4 TIMES DAILY 120 tablet 1    cholecalciferol, vitamin D3, (VITAMIN D3) 1,000  "unit capsule Take 1 capsule (1,000 Units total) by mouth once daily. 90 capsule 3    clopidogrel (PLAVIX) 75 mg tablet Take 1 tablet (75 mg total) by mouth once daily. 90 tablet 3    furosemide (LASIX) 20 MG tablet Take 1 tablet (20 mg total) by mouth 2 (two) times daily. 90 tablet 3    NOVOLOG FLEXPEN U-100 INSULIN 100 unit/mL (3 mL) InPn pen INJECT 7 UNITS UNDER THE SKIN 3 TIMES DAILY WITH MEALS 15 Syringe 11    pen needle, diabetic 31 gauge x 1/4" Ndle For mealtime insulin 300 each 11    phenytoin (DILANTIN) 100 MG ER capsule Take 1 capsule (100 mg total) by mouth 3 (three) times daily. 270 capsule 3    tamsulosin (FLOMAX) 0.4 mg Cap Take 2 capsules (0.8 mg total) by mouth once daily. 180 capsule 3    blood-glucose meter Misc 1 each by Misc.(Non-Drug; Combo Route) route once daily. Pharmacy-whichever brand specified by insurance 1 each 0    lancets (ONETOUCH DELICA LANCETS) 33 gauge Misc 1 lancet by Misc.(Non-Drug; Combo Route) route once daily. ONCE DAILY BLOOD SUGAR TESTING; WHICHEVER BRAND COVERED BY INSURANCE 30 each 11     No current facility-administered medications for this visit.        Review of Systems   Constitutional: Negative for chills and fever.   Respiratory: Negative for shortness of breath.    Cardiovascular: Negative for chest pain.       Objective:   Physical Exam   Vitals:    01/17/20 1339 01/17/20 1356   BP: (!) 170/84 (!) 146/76   BP Location: Left arm Left arm   Patient Position: Sitting Sitting   BP Method: Large (Manual) Large (Manual)   Pulse: 71    Temp: 98.5 °F (36.9 °C)    TempSrc: Oral    SpO2: 99%    Weight: 93.9 kg (207 lb)    Height: 5' 8" (1.727 m)     Body mass index is 31.47 kg/m².  Weight: 93.9 kg (207 lb)   Height: 5' 8" (172.7 cm)     Physical Exam   Constitutional: He is oriented to person, place, and time. He appears well-developed and well-nourished. No distress.   Eyes: EOM are normal.   Cardiovascular: Normal rate, regular rhythm and normal heart sounds.   No " murmur heard.  Pulmonary/Chest: Effort normal and breath sounds normal.   Musculoskeletal: Normal range of motion. He exhibits edema.   2+ edema to knees bilaterally   Neurological: He is alert and oriented to person, place, and time. Coordination abnormal.   Dense hemiplegia on the right side   Skin: Skin is warm and dry.   Psychiatric: He has a normal mood and affect. His behavior is normal. Thought content normal.

## 2020-01-17 NOTE — PROGRESS NOTES
HPI     63 Y/o here for screening for diabetic retinopathy with non-dilated   fundus photos per Dr. Raymond     Last edited by Han Arguelles MA on 1/17/2020  3:18 PM. (History)            Assessment /Plan     For exam results, see Encounter Report.    Controlled type 2 diabetes mellitus with stage 3 chronic kidney disease, with long-term current use of insulin  -     Diabetic Eye Screening Photo      Please see Dr. Lozano progress note for interpretation  Patient pupils were small

## 2020-01-18 LAB
ALBUMIN SERPL BCP-MCNC: 2.9 G/DL (ref 3.5–5.2)
ALP SERPL-CCNC: 81 U/L (ref 55–135)
ALT SERPL W/O P-5'-P-CCNC: 11 U/L (ref 10–44)
ANION GAP SERPL CALC-SCNC: 9 MMOL/L (ref 8–16)
AST SERPL-CCNC: 11 U/L (ref 10–40)
BILIRUB SERPL-MCNC: 0.2 MG/DL (ref 0.1–1)
BUN SERPL-MCNC: 53 MG/DL (ref 8–23)
CALCIUM SERPL-MCNC: 7.8 MG/DL (ref 8.7–10.5)
CHLORIDE SERPL-SCNC: 110 MMOL/L (ref 95–110)
CHOLEST SERPL-MCNC: 199 MG/DL (ref 120–199)
CHOLEST/HDLC SERPL: 3.2 {RATIO} (ref 2–5)
CO2 SERPL-SCNC: 19 MMOL/L (ref 23–29)
CREAT SERPL-MCNC: 3.5 MG/DL (ref 0.5–1.4)
EST. GFR  (AFRICAN AMERICAN): 20 ML/MIN/1.73 M^2
EST. GFR  (NON AFRICAN AMERICAN): 17.3 ML/MIN/1.73 M^2
ESTIMATED AVG GLUCOSE: 126 MG/DL (ref 68–131)
GLUCOSE SERPL-MCNC: 150 MG/DL (ref 70–110)
HBA1C MFR BLD HPLC: 6 % (ref 4–5.6)
HDLC SERPL-MCNC: 63 MG/DL (ref 40–75)
HDLC SERPL: 31.7 % (ref 20–50)
LDLC SERPL CALC-MCNC: 125.6 MG/DL (ref 63–159)
NONHDLC SERPL-MCNC: 136 MG/DL
PHENYTOIN SERPL-MCNC: 3.7 UG/ML (ref 10–20)
POTASSIUM SERPL-SCNC: 5 MMOL/L (ref 3.5–5.1)
PROT SERPL-MCNC: 6 G/DL (ref 6–8.4)
SODIUM SERPL-SCNC: 138 MMOL/L (ref 136–145)
TRIGL SERPL-MCNC: 52 MG/DL (ref 30–150)

## 2020-01-19 RX ORDER — ATORVASTATIN CALCIUM 80 MG/1
80 TABLET, FILM COATED ORAL DAILY
Qty: 90 TABLET | Refills: 3 | Status: SHIPPED | OUTPATIENT
Start: 2020-01-19 | End: 2020-07-31 | Stop reason: SDUPTHER

## 2020-01-20 ENCOUNTER — TELEPHONE (OUTPATIENT)
Dept: OPHTHALMOLOGY | Facility: CLINIC | Age: 65
End: 2020-01-20

## 2020-01-20 ENCOUNTER — TELEPHONE (OUTPATIENT)
Dept: FAMILY MEDICINE | Facility: CLINIC | Age: 65
End: 2020-01-20

## 2020-01-20 DIAGNOSIS — I69.398 SEIZURE DISORDER AS SEQUELA OF CEREBROVASCULAR ACCIDENT: Primary | ICD-10-CM

## 2020-01-20 DIAGNOSIS — G40.909 SEIZURE DISORDER AS SEQUELA OF CEREBROVASCULAR ACCIDENT: Primary | ICD-10-CM

## 2020-01-20 DIAGNOSIS — N18.4 CKD (CHRONIC KIDNEY DISEASE) STAGE 4, GFR 15-29 ML/MIN: Primary | ICD-10-CM

## 2020-01-20 NOTE — TELEPHONE ENCOUNTER
I can see that there is US Doppler renal artery and vein, ordered 10/30/19 by Dr. Roa.  Is that order still valid?

## 2020-01-20 NOTE — PROGRESS NOTES
Creatinine continues to climb  A1c good  Dilantin level was low.  Need to confirm that he was actually taking it.  Has historically been okay.  Lipid profile good  He notes that he stays hydrated.  He had lab workup with Nephrology in November.  Should proteinuria.  Hep C negative.  MARITZA negative.  RPR negative.  Complement levels normal.  phospholipase A2 antibodies negative  Renal Dopplers were ordered but not done.  To review for obstruction.  And possible biopsy.  Previous urinalysis with low specific gravity.  He notes he stays hydrated.  He will need to follow up with Nephrology about this.

## 2020-01-20 NOTE — TELEPHONE ENCOUNTER
----- Message from Tiffany Figueroa sent at 1/20/2020  1:57 PM CST -----  Contact: Self   Type: Patient Call Back    Who called: self     What is the request in detail:  Patient is asking to speak with the office   Can the clinic reply by [facility]?  Call   Would the patient rather a call back or a response via [facility]?  Call   Best call back number: [phone]      Additional Information:

## 2020-01-20 NOTE — TELEPHONE ENCOUNTER
Below information given to patient, verbalized   understanding, states is taking dilantin, referrals to assist with nephology appointment, no orders in system for kidney ultrasound. Please advise

## 2020-01-20 NOTE — TELEPHONE ENCOUNTER
Please call pt - unfortunately, his creatinine (kidney test) is going up.  i'm not sure why. He saw nephrology in November and he is due to follow up.  But he needs to do the kidney ultrasound (I think we talked about this at his visit and I gave him the phone # for radiology).  He'll need to do that and follow up with nephrology. Very important that he do so.    Can we help him arrange follow up visit with nephrology? Or contact the nephrology dept to schedule him?     And is he taking his dilantin? For now no change will need to repeat labs next visit.  F/u 3 months.

## 2020-01-21 ENCOUNTER — TELEPHONE (OUTPATIENT)
Dept: FAMILY MEDICINE | Facility: CLINIC | Age: 65
End: 2020-01-21

## 2020-01-21 NOTE — TELEPHONE ENCOUNTER
----- Message from Stewart Torres sent at 1/21/2020 10:47 AM CST -----  Contact: Self: [phone]  Type: Patient Call Back    Who called:Self    What is the request in detail:Patient is requesting orders be submitted for a motor scooter    Can the clinic reply by [facility]? NO    Would the patient rather a call back or a response via [facility]?     Best call back number:[phone]

## 2020-02-06 ENCOUNTER — TELEPHONE (OUTPATIENT)
Dept: NEPHROLOGY | Facility: CLINIC | Age: 65
End: 2020-02-06

## 2020-02-06 DIAGNOSIS — N18.4 CHRONIC KIDNEY DISEASE, STAGE IV (SEVERE): Primary | ICD-10-CM

## 2020-02-10 ENCOUNTER — TELEPHONE (OUTPATIENT)
Dept: NEPHROLOGY | Facility: CLINIC | Age: 65
End: 2020-02-10

## 2020-02-14 ENCOUNTER — TELEPHONE (OUTPATIENT)
Dept: NEPHROLOGY | Facility: CLINIC | Age: 65
End: 2020-02-14

## 2020-02-27 ENCOUNTER — HOSPITAL ENCOUNTER (OUTPATIENT)
Dept: RADIOLOGY | Facility: HOSPITAL | Age: 65
Discharge: HOME | End: 2020-02-27
Attending: INTERNAL MEDICINE
Payer: MEDICARE

## 2020-02-27 DIAGNOSIS — N18.4 CKD (CHRONIC KIDNEY DISEASE) STAGE 4, GFR 15-29 ML/MIN: ICD-10-CM

## 2020-02-27 PROCEDURE — 93975 US DOPPLER RENAL ARTERY AND VEIN (XPD): ICD-10-PCS | Mod: 26,,, | Performed by: RADIOLOGY

## 2020-02-27 PROCEDURE — 93975 VASCULAR STUDY: CPT | Mod: 26,,, | Performed by: RADIOLOGY

## 2020-02-27 PROCEDURE — 93975 VASCULAR STUDY: CPT | Mod: TC

## 2020-02-27 NOTE — PROGRESS NOTES
1. Symmetric diffusely increased cortical echogenicity and elevated resistive indices, nonspecific indicators of chronic medical renal disease.  2. On the images provided, no Doppler evidence of hemodynamically significant renal artery stenosis bilaterally.  3. Prostatomegaly.    Medical renal disease, no ALF  Nephrology had wanted this to complete workup.  Copy of results to pt and also to nephrology.

## 2020-03-05 ENCOUNTER — TELEPHONE (OUTPATIENT)
Dept: FAMILY MEDICINE | Facility: CLINIC | Age: 65
End: 2020-03-05

## 2020-03-05 NOTE — TELEPHONE ENCOUNTER
----- Message from Omero Johnson sent at 3/5/2020 12:33 PM CST -----  Contact: Pt   Name of Who is Calling: TRACI CHAO [[id number]]    What is the request in detail: Patient is requesting a call back regards to insulin ..... Please contact to further discuss and advise      Can the clinic reply by [facility]: No     [facility]  [phone] (home)

## 2020-03-27 ENCOUNTER — TELEPHONE (OUTPATIENT)
Dept: FAMILY MEDICINE | Facility: CLINIC | Age: 65
End: 2020-03-27

## 2020-03-27 DIAGNOSIS — E11.22 CONTROLLED TYPE 2 DIABETES MELLITUS WITH STAGE 3 CHRONIC KIDNEY DISEASE, WITH LONG-TERM CURRENT USE OF INSULIN: Primary | Chronic | ICD-10-CM

## 2020-03-27 DIAGNOSIS — N18.30 CONTROLLED TYPE 2 DIABETES MELLITUS WITH STAGE 3 CHRONIC KIDNEY DISEASE, WITH LONG-TERM CURRENT USE OF INSULIN: Primary | Chronic | ICD-10-CM

## 2020-03-27 DIAGNOSIS — Z79.4 CONTROLLED TYPE 2 DIABETES MELLITUS WITH STAGE 3 CHRONIC KIDNEY DISEASE, WITH LONG-TERM CURRENT USE OF INSULIN: Primary | Chronic | ICD-10-CM

## 2020-03-27 NOTE — TELEPHONE ENCOUNTER
Currently on novolog, I have never rx's levemir.    Should be on novolog 7 units with meals. So total daily dose of 21 units.  Last a1c 6.0    Stop the novolog.  Take levemir but reduced dose 16 units daily. Check sugars.  If morning sugars < 90, will need to lower dose.  If morning sugars > 140 will need to raise dose.

## 2020-03-27 NOTE — TELEPHONE ENCOUNTER
----- Message from Xuan Singleton sent at 3/27/2020  2:02 PM CDT -----  Contact: Self [phone]  Type: Patient Call Back    Who called:Self    What is the request in detail: pt is calling in regards to questions about his current insulin that he is on and he is wondering if he can be on LEVEMIR    Can the clinic reply by [facility]? Call back    Would the patient rather a call back or a response via [facility]? Call back    Best call back number: [phone]

## 2020-04-04 DIAGNOSIS — I69.398 SEIZURE DISORDER AS SEQUELA OF CEREBROVASCULAR ACCIDENT: Chronic | ICD-10-CM

## 2020-04-04 DIAGNOSIS — G40.909 SEIZURE DISORDER AS SEQUELA OF CEREBROVASCULAR ACCIDENT: Chronic | ICD-10-CM

## 2020-04-04 DIAGNOSIS — Z86.73 HISTORY OF STROKE: ICD-10-CM

## 2020-04-04 DIAGNOSIS — R35.89 POLYURIA: ICD-10-CM

## 2020-04-05 RX ORDER — TAMSULOSIN HYDROCHLORIDE 0.4 MG/1
CAPSULE ORAL
Qty: 180 CAPSULE | Refills: 0 | Status: SHIPPED | OUTPATIENT
Start: 2020-04-05 | End: 2020-07-05

## 2020-04-05 RX ORDER — CLOPIDOGREL BISULFATE 75 MG/1
TABLET ORAL
Qty: 90 TABLET | Refills: 0 | Status: SHIPPED | OUTPATIENT
Start: 2020-04-05 | End: 2020-07-05

## 2020-04-05 RX ORDER — PHENYTOIN SODIUM 100 MG/1
CAPSULE, EXTENDED RELEASE ORAL
Qty: 270 CAPSULE | Refills: 0 | Status: SHIPPED | OUTPATIENT
Start: 2020-04-05 | End: 2020-07-31 | Stop reason: SDUPTHER

## 2020-04-14 ENCOUNTER — PATIENT OUTREACH (OUTPATIENT)
Dept: ADMINISTRATIVE | Facility: HOSPITAL | Age: 65
End: 2020-04-14

## 2020-04-16 ENCOUNTER — TELEPHONE (OUTPATIENT)
Dept: PODIATRY | Facility: CLINIC | Age: 65
End: 2020-04-16

## 2020-04-16 NOTE — TELEPHONE ENCOUNTER
----- Message from Dionne Sumner sent at 4/16/2020  2:42 PM CDT -----  Contact: Patient [phone]  Type: Patient Call Back    Who called: Patient    What is the request in detail: Calling to get the name of pads Dr Douglas gave him to put between his toes. Pt was told that he could get them from [facility], but need the name. Please call pt.    Would the patient rather a call back or a response via [facility]? Call back    Best call back number: [phone]

## 2020-04-17 DIAGNOSIS — Z86.73 HISTORY OF STROKE: ICD-10-CM

## 2020-04-17 DIAGNOSIS — I69.351 HEMIPLEGIA AND HEMIPARESIS FOLLOWING CEREBRAL INFARCTION AFFECTING RIGHT DOMINANT SIDE: Chronic | ICD-10-CM

## 2020-04-17 RX ORDER — BACLOFEN 10 MG/1
TABLET ORAL
Qty: 120 TABLET | Refills: 0 | Status: SHIPPED | OUTPATIENT
Start: 2020-04-17 | End: 2020-06-21

## 2020-05-12 DIAGNOSIS — R60.0 PERIPHERAL EDEMA: ICD-10-CM

## 2020-05-12 RX ORDER — FUROSEMIDE 20 MG/1
20 TABLET ORAL DAILY
Qty: 90 TABLET | Refills: 1 | Status: SHIPPED | OUTPATIENT
Start: 2020-05-12 | End: 2020-07-31 | Stop reason: SDUPTHER

## 2020-06-25 ENCOUNTER — TELEPHONE (OUTPATIENT)
Dept: FAMILY MEDICINE | Facility: CLINIC | Age: 65
End: 2020-06-25

## 2020-06-25 ENCOUNTER — PATIENT OUTREACH (OUTPATIENT)
Dept: ADMINISTRATIVE | Facility: OTHER | Age: 65
End: 2020-06-25

## 2020-06-25 DIAGNOSIS — I69.351 HEMIPLEGIA AND HEMIPARESIS FOLLOWING CEREBRAL INFARCTION AFFECTING RIGHT DOMINANT SIDE: ICD-10-CM

## 2020-06-25 DIAGNOSIS — R53.1 RIGHT SIDED WEAKNESS: Primary | ICD-10-CM

## 2020-06-25 RX ORDER — TIZANIDINE 2 MG/1
4 TABLET ORAL EVERY 8 HOURS
Qty: 60 TABLET | Refills: 0 | Status: SHIPPED | OUTPATIENT
Start: 2020-06-25 | End: 2020-06-30 | Stop reason: SINTOL

## 2020-06-25 NOTE — TELEPHONE ENCOUNTER
Advice Medication expensive Baclofen an alternative medication'            ----- Message from Xuan Singleton sent at 6/25/2020 12:03 PM CDT -----  Contact: Self [phone]  Type: Patient Call Back    Who called: Self    What is the request in detail: pt is calling to find out if there is a less expensive drug that is equivalent to baclofen (LIORESAL) 10 MG tablet because it is too expensive the patient can not afford the medication    Can the clinic reply by [facility]? Call back    Would the patient rather a call back or a response via [facility]? Call back    Best call back number: [phone]

## 2020-06-25 NOTE — PROGRESS NOTES
Requested updates within Care Everywhere.  Patient's chart was reviewed for overdue VERA topics.

## 2020-06-25 NOTE — TELEPHONE ENCOUNTER
I can try him on tizanidine, see if it's cheaper, but may not be as effective as the baclofen.   negative...

## 2020-06-30 ENCOUNTER — TELEPHONE (OUTPATIENT)
Dept: FAMILY MEDICINE | Facility: CLINIC | Age: 65
End: 2020-06-30

## 2020-06-30 ENCOUNTER — PATIENT OUTREACH (OUTPATIENT)
Dept: ADMINISTRATIVE | Facility: HOSPITAL | Age: 65
End: 2020-06-30

## 2020-06-30 DIAGNOSIS — I69.351 HEMIPLEGIA AND HEMIPARESIS FOLLOWING CEREBRAL INFARCTION AFFECTING RIGHT DOMINANT SIDE: Primary | ICD-10-CM

## 2020-06-30 RX ORDER — BACLOFEN 10 MG/1
10 TABLET ORAL 4 TIMES DAILY
Qty: 120 TABLET | Refills: 11 | Status: SHIPPED | OUTPATIENT
Start: 2020-06-30 | End: 2020-07-31 | Stop reason: SDUPTHER

## 2020-06-30 NOTE — TELEPHONE ENCOUNTER
Spoke with patient and he informed me that his medication was changed from Baclofen to Zanaflex. Patient is requesting to be changed back to the Baclofen. He said that the Zanaflex is giving him diarrhea and he is be over heated.

## 2020-06-30 NOTE — TELEPHONE ENCOUNTER
Baclofen was expensive so changed to tizanidine.  I will send in baclofen.  If he is unable to afford it, I can put in referral for pharmacy assistance.

## 2020-06-30 NOTE — TELEPHONE ENCOUNTER
----- Message from Beht Martinez sent at 6/30/2020  1:32 PM CDT -----  Regarding: medications causing diarrhea  Type: Patient Call Back    Who called:pt    What is the request in detail:pt calling to discuss medications. Call pt    Can the clinic reply by [facility]?    Would the patient rather a call back or a response via [facility]? call    Best call back number:[phone] (home)       Additional Information:

## 2020-07-13 ENCOUNTER — TELEPHONE (OUTPATIENT)
Dept: FAMILY MEDICINE | Facility: CLINIC | Age: 65
End: 2020-07-13

## 2020-07-13 NOTE — TELEPHONE ENCOUNTER
----- Message from Jessie Kenroy sent at 7/13/2020  2:16 PM CDT -----  Contact: TRACI CHAO [[id number]]  Type: Patient Call Back    Who called: TRACI CHAO [[id number]]    What is the request in detail: Patient is requesting a call back. He states that he had an reaction to the last medication that he was given. He is not sure of the name. He states that he was vomiting and had diarrhea. He stats that he stopped taking the medication and would like to know what he should do next.   Please advise.    Can the clinic reply by [facility]? No    Best call back number: [phone]    Additional Information: N/A

## 2020-07-13 NOTE — TELEPHONE ENCOUNTER
Patient said that he was calling provider to let him know that he is doing better on the Balcofen. Patient has a follow up appointment with provider for 7/31/20. Appointment slip in the mail as requested.

## 2020-07-17 ENCOUNTER — PATIENT OUTREACH (OUTPATIENT)
Dept: ADMINISTRATIVE | Facility: HOSPITAL | Age: 65
End: 2020-07-17

## 2020-07-17 DIAGNOSIS — Z71.89 COMPLEX CARE COORDINATION: ICD-10-CM

## 2020-07-20 ENCOUNTER — OFFICE VISIT (OUTPATIENT)
Dept: PODIATRY | Facility: CLINIC | Age: 65
End: 2020-07-20
Payer: MEDICARE

## 2020-07-20 VITALS
DIASTOLIC BLOOD PRESSURE: 84 MMHG | WEIGHT: 207 LBS | HEART RATE: 85 BPM | SYSTOLIC BLOOD PRESSURE: 189 MMHG | HEIGHT: 68 IN | BODY MASS INDEX: 31.37 KG/M2

## 2020-07-20 DIAGNOSIS — B35.1 ONYCHOMYCOSIS DUE TO DERMATOPHYTE: ICD-10-CM

## 2020-07-20 DIAGNOSIS — E11.49 TYPE II DIABETES MELLITUS WITH NEUROLOGICAL MANIFESTATIONS: Primary | ICD-10-CM

## 2020-07-20 PROCEDURE — 99999 PR PBB SHADOW E&M-EST. PATIENT-LVL III: ICD-10-PCS | Mod: PBBFAC,,, | Performed by: PODIATRIST

## 2020-07-20 PROCEDURE — 99213 OFFICE O/P EST LOW 20 MIN: CPT | Mod: PBBFAC,PO | Performed by: PODIATRIST

## 2020-07-20 PROCEDURE — 11721 PR DEBRIDEMENT OF NAILS, 6 OR MORE: ICD-10-PCS | Mod: Q9,S$PBB,, | Performed by: PODIATRIST

## 2020-07-20 PROCEDURE — 99999 PR PBB SHADOW E&M-EST. PATIENT-LVL III: CPT | Mod: PBBFAC,,, | Performed by: PODIATRIST

## 2020-07-20 PROCEDURE — 11721 DEBRIDE NAIL 6 OR MORE: CPT | Mod: Q9,S$PBB,, | Performed by: PODIATRIST

## 2020-07-20 PROCEDURE — 99499 NO LOS: ICD-10-PCS | Mod: S$PBB,,, | Performed by: PODIATRIST

## 2020-07-20 PROCEDURE — 99499 UNLISTED E&M SERVICE: CPT | Mod: S$PBB,,, | Performed by: PODIATRIST

## 2020-07-20 PROCEDURE — 11721 DEBRIDE NAIL 6 OR MORE: CPT | Mod: PBBFAC,PO,59 | Performed by: PODIATRIST

## 2020-07-21 NOTE — PROGRESS NOTES
Subjective:      Patient ID: Miguel Moore is a 64 y.o. male.    Chief Complaint: Diabetes Mellitus (ov Dr Raymond PCP 1/16/20) and Nail Care    Miguel is a 64 y.o. male who presents to the clinic for evaluation and treatment of high risk feet. 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 The patient's chief complaint is long, thick toenails on both feet and calluses on toes of left foot. Routine trimming of these help keep symptoms under control. This patient has documented high risk feet requiring routine maintenance secondary to diabetes mellitis and those secondary complications of diabetes, as mentioned. No other major complaints today. Has hx of stroke and right sided weakness.Presents for diabetic foot risk assessment. No new issues.    PCP: Raciel Raymond MD    Date Last Seen by PCP:   Chief Complaint   Patient presents with    Diabetes Mellitus     ov Dr Raymond PCP 1/16/20    Nail Care         Current shoe gear:   Tennis shoes         Hemoglobin A1C   Date Value Ref Range Status   01/17/2020 6.0 (H) 4.0 - 5.6 % Final     Comment:     ADA Screening Guidelines:  5.7-6.4%  Consistent with prediabetes  >or=6.5%  Consistent with diabetes  High levels of fetal hemoglobin interfere with the HbA1C  assay. Heterozygous hemoglobin variants (HbS, HgC, etc)do  not significantly interfere with this assay.   However, presence of multiple variants may affect accuracy.     09/09/2019 6.1 (H) 4.0 - 5.6 % Final     Comment:     ADA Screening Guidelines:  5.7-6.4%  Consistent with prediabetes  >or=6.5%  Consistent with diabetes  High levels of fetal hemoglobin interfere with the HbA1C  assay. Heterozygous hemoglobin variants (HbS, HgC, etc)do  not significantly interfere with this assay.   However, presence of multiple variants may affect accuracy.     01/04/2019 6.3 (H) 4.0 - 5.6 % Final     Comment:     ADA  Screening Guidelines:  5.7-6.4%  Consistent with prediabetes  >or=6.5%  Consistent with diabetes  High levels of fetal hemoglobin interfere with the HbA1C  assay. Heterozygous hemoglobin variants (HbS, HgC, etc)do  not significantly interfere with this assay.   However, presence of multiple variants may affect accuracy.       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 Tay    CATARACT EXTRACTION W/  INTRAOCULAR LENS IMPLANT Left 10/19/2017    Dr. Tay    FEMORAL ARTERY STENT      KNEE SURGERY      bilateral scope       Family History   Problem Relation Age of Onset    Diabetes Mother     Heart disease Mother     Hypertension Mother     Cataracts Mother     No Known Problems Father     Hypertension Sister     No Known Problems Brother     No Known Problems Daughter     No Known Problems Maternal Aunt     No Known Problems Maternal Uncle     No Known Problems Paternal Aunt     No Known Problems Paternal Uncle     No Known Problems Maternal Grandmother     No Known Problems Maternal Grandfather     No Known Problems Paternal Grandmother     No Known Problems Paternal Grandfather     Amblyopia Neg Hx     Blindness Neg Hx     Cancer Neg Hx     Glaucoma Neg Hx     Macular degeneration Neg Hx     Retinal detachment Neg Hx     Strabismus Neg Hx     Stroke Neg Hx     Thyroid disease Neg Hx        Social History     Socioeconomic History    Marital status: Single     Spouse name: Not on file    Number of children: Not on file    Years of education: Not on file    Highest education level: Not on file   Occupational History    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, etc   Social Needs    Financial resource  "strain: Not on file    Food insecurity     Worry: Not on file     Inability: Not on file    Transportation needs     Medical: Not on file     Non-medical: Not on file   Tobacco Use    Smoking status: Never Smoker    Smokeless tobacco: Never Used   Substance and Sexual Activity    Alcohol use: No    Drug use: No    Sexual activity: Not on file   Lifestyle    Physical activity     Days per week: Not on file     Minutes per session: Not on file    Stress: Not on file   Relationships    Social connections     Talks on phone: Not on file     Gets together: Not on file     Attends Muslim service: Not on file     Active member of club or organization: Not on file     Attends meetings of clubs or organizations: Not on file     Relationship status: Not on file   Other Topics Concern    Not on file   Social History Narrative    Not on file       Current Outpatient Medications   Medication Sig Dispense Refill    amLODIPine (NORVASC) 10 MG tablet Take 1 tablet (10 mg total) by mouth once daily. 90 tablet 3    atorvastatin (LIPITOR) 80 MG tablet Take 1 tablet (80 mg total) by mouth once daily. 90 tablet 3    baclofen (LIORESAL) 10 MG tablet Take 1 tablet (10 mg total) by mouth 4 (four) times daily. 120 tablet 11    cholecalciferol, vitamin D3, (VITAMIN D3) 1,000 unit capsule Take 1 capsule (1,000 Units total) by mouth once daily. 90 capsule 3    clopidogreL (PLAVIX) 75 mg tablet Take 1 tablet by mouth once daily 90 tablet 0    furosemide (LASIX) 20 MG tablet Take 1 tablet (20 mg total) by mouth once daily. 90 tablet 1    insulin detemir U-100 (LEVEMIR FLEXTOUCH U-100 INSULN) 100 unit/mL (3 mL) SubQ InPn pen Inject 16 Units into the skin every evening. STOP NOVOLOG 1 Box 11    pen needle, diabetic 31 gauge x 1/4" Ndle For mealtime insulin 300 each 11    phenytoin (DILANTIN) 100 MG ER capsule TAKE 1 CAPSULE BY MOUTH THREE TIMES DAILY 270 capsule 0    tamsulosin (FLOMAX) 0.4 mg Cap Take 2 capsules by mouth " once daily 180 capsule 0    blood-glucose meter Misc 1 each by Misc.(Non-Drug; Combo Route) route once daily. Pharmacy-whichever brand specified by insurance 1 each 0    lancets (ONETOUCH DELICA LANCETS) 33 gauge Misc 1 lancet by Misc.(Non-Drug; Combo Route) route once daily. ONCE DAILY BLOOD SUGAR TESTING; WHICHEVER BRAND COVERED BY INSURANCE 30 each 11     No current facility-administered medications for this visit.        Review of patient's allergies indicates:   Allergen Reactions    Penicillins Hives       Review of Systems   Constitution: Negative for chills and fever.   Cardiovascular: Positive for leg swelling. Negative for chest pain and claudication.   Respiratory: Negative for cough and shortness of breath.    Skin: Positive for dry skin and suspicious lesions (corns/callus).   Musculoskeletal: Positive for muscle weakness.   Gastrointestinal: Negative for nausea and vomiting.   Neurological: Positive for focal weakness (right sided), numbness and sensory change. Negative for paresthesias.   Psychiatric/Behavioral: Negative for altered mental status.           Objective:      Physical Exam  Vitals signs reviewed.   Constitutional:       Appearance: He is well-developed.   HENT:      Head: Normocephalic.   Cardiovascular:      Pulses:           Dorsalis pedis pulses are 2+ on the right side and 2+ on the left side.        Posterior tibial pulses are 1+ on the right side and 1+ on the left side.      Comments: CRT < 3 sec to tips of toes. 2+ pitting edema noted to b/l LE. No vericosities noted to b/l LEs.     Pulmonary:      Effort: No respiratory distress.   Musculoskeletal:      Comments: Gastrocnemius equinus noted to b/l ankles with decreased DF noted on exam. MMT 5/5 in DF/PF/Inv/Ev resistance with no reproduction of pain in any direction Right, 3/5 left. Passive range of motion of ankle and pedal joints is painless. Adequate pedal joint ROM.     Rigid hammertoe contractures noted to toes 2-4  R-asymptomatic.     Limited hallux MTPJ ROM with plantarflexion contracture of b/l hallux IPJ, rigid R semi-reducible L.    Skin:     General: Skin is warm and dry.      Findings: Lesion present. No ecchymosis or erythema.      Comments: No open lesions, lacerations or wounds noted. Nails are dystrophic, elongated, thickened with yellow/brown discoloration to R 1 and L 1-5. Remaining toenails normotrophic.  Interdigital spaces clean, dry and intact b/l. No erythema noted to b/l foot. Skin texture thin, shiny, atrophic. Pedal hair absent. Toes cool to touch b/l.        Neurological:      Mental Status: He is alert and oriented to person, place, and time.      Sensory: Sensory deficit present.      Comments: Light touch, proprioception, and sharp/dull sensation are all intact bilaterally. Protective threshold with the Gotebo-Wienstein monofilament is intact bilaterally. Vibratory sensation diminished b/l distal foot.      Psychiatric:         Behavior: Behavior normal.         Thought Content: Thought content normal.         Judgment: Judgment normal.               Assessment:       Encounter Diagnoses   Name Primary?    Type II diabetes mellitus with neurological manifestations Yes    Onychomycosis due to dermatophyte          Plan:       Miguel was seen today for diabetes mellitus and nail care.    Diagnoses and all orders for this visit:    Type II diabetes mellitus with neurological manifestations    Onychomycosis due to dermatophyte      I counseled the patient on his conditions, their implications and medical management.    - Shoe inspection. Diabetic Foot Education. Patient reminded of the importance of good nutrition and blood sugar control to help prevent podiatric complications of diabetes. Patient instructed on proper foot hygeine. We discussed wearing proper shoe gear, daily foot inspections, never walking without protective shoe gear, never putting sharp instruments to feet, routine podiatric nail  visits every 2-3 months.      Discussed regular and routine moisturizer to skin of both feet to help improve dry skin. Advised to apply twice daily until resolution of symptoms. Avoid between toes. Recommended Gold Bond Foot cream or Diabetic cream.      - With patient's permission, nails were aggressively reduced and debrided x 10 to their soft tissue attachment mechanically and with electric , removing all offending nail and debris. Patient relates relief following the procedure. He will continue to monitor the areas daily, inspect his feet, wear protective shoe gear when ambulatory, moisturizer to maintain skin integrity and follow in this office in approximately 2-3 months, sooner p.r.jacob.       Sabi Douglas DPM

## 2020-07-22 NOTE — TELEPHONE ENCOUNTER
I just sent Metformin today to a different pharmacy, meds pended are duplicates as well, most of these meds were just refilled, please clarify with pt I am not sending meds to multiple pharmacies when he has refills I put in the order 1/17 already

## 2020-07-30 NOTE — PROGRESS NOTES
This note was created by combination of typed  and M-Modal dictation.  Transcription errors may be present.  If there are any questions, please contact me.    Assessment and Plan:   Controlled type 2 diabetes mellitus with stage 3 chronic kidney disease, with long-term current use of insulin  DM type 2 without retinopathy  Type 2 diabetes mellitus with diabetic neuropathy, with long-term current use of insulin  -status post switch from NovoLog to Levemir.  Reports that morning sugars are good in the 110-120 range.  Denies hypoglycemia or hyperglycemia  Check A1c.  On statin  -     insulin detemir U-100 (LEVEMIR FLEXTOUCH U-100 INSULN) 100 unit/mL (3 mL) InPn pen; Inject 16 Units into the skin every evening. STOP NOVOLOG  Dispense: 1 Box; Refill: 11      Benign essential HTN; ACEI hyperK  -for now no change on amlodipine, furosemide  -     amLODIPine (NORVASC) 10 MG tablet; Take 1 tablet (10 mg total) by mouth once daily.  Dispense: 90 tablet; Refill: 3    CKD (chronic kidney disease) stage 4, GFR 15-29 ml/min with proteinuria  Peripheral edema  Secondary hyperparathyroidism of renal origin  -needs the renal Doppler ultrasound that was ordered previously.  And needs to follow-up with nephrology.  Our staff to assist with arranging the ultrasound  On Lasix  -     furosemide (LASIX) 20 MG tablet; Take 1 tablet (20 mg total) by mouth once daily.  Dispense: 90 tablet; Refill: 3    Microcytosis  -likely anemia of chronic kidney disease as well as anemia of chronic disease with elevated ferritin.  Needs colon cancer screening.  It has been difficult for him to perform fit kit because of dexterity issues.    Pure hypercholesterolemia  -check labs on statin    History of stroke  Hemiplegia and hemiparesis following cerebral infarction affecting right dominant side  -on Plavix on statin on baclofen.  Tizanidine with side effects.  -     clopidogreL (PLAVIX) 75 mg tablet; Take 1 tablet (75 mg total) by mouth once  daily.  Dispense: 90 tablet; Refill: 3  -     baclofen (LIORESAL) 10 MG tablet; Take 1 tablet (10 mg total) by mouth 4 (four) times daily.  Dispense: 120 tablet; Refill: 11    Seizure disorder as sequela of cerebrovascular accident  -check Dilantin levels.  Takes t.i.d.  -     phenytoin (DILANTIN) 100 MG ER capsule; Take 1 capsule (100 mg total) by mouth 3 (three) times daily.  Dispense: 270 capsule; Refill: 3    Need for shingles vaccine  -     (In Office Administered) Zoster Recombinant Vaccine    Polyuria  -refilled tamsulosin  -     tamsulosin (FLOMAX) 0.4 mg Cap; Take 2 capsules (0.8 mg total) by mouth once daily.  Dispense: 180 capsule; Refill: 3    Diarrhea, unspecified type  -unclear etiology.  Seems to be persisting too long to be SE of tizanidine.  OTC immodium.  If no improvement - stool collection kit given to pt to check for culture and O+P    Screening for colon cancer  -cologuard ordered.  -     Cologuard Screening (Multitarget Stool DNA); Future; Expected date: 07/31/2020          Medications Discontinued During This Encounter   Medication Reason    amLODIPine (NORVASC) 10 MG tablet Reorder    atorvastatin (LIPITOR) 80 MG tablet Reorder    insulin detemir U-100 (LEVEMIR FLEXTOUCH U-100 INSULN) 100 unit/mL (3 mL) SubQ InPn pen Reorder    phenytoin (DILANTIN) 100 MG ER capsule Reorder    furosemide (LASIX) 20 MG tablet Reorder    baclofen (LIORESAL) 10 MG tablet Reorder    clopidogreL (PLAVIX) 75 mg tablet Reorder    tamsulosin (FLOMAX) 0.4 mg Cap Reorder    atorvastatin (LIPITOR) 80 MG tablet Reorder       meds sent this encounter:  Medications Ordered This Encounter   Medications    amLODIPine (NORVASC) 10 MG tablet     Sig: Take 1 tablet (10 mg total) by mouth once daily.     Dispense:  90 tablet     Refill:  3     Increased dose    atorvastatin (LIPITOR) 80 MG tablet     Sig: Take 1 tablet (80 mg total) by mouth once daily.     Dispense:  90 tablet     Refill:  3          baclofen  (LIORESAL) 10 MG tablet     Sig: Take 1 tablet (10 mg total) by mouth 4 (four) times daily.     Dispense:  120 tablet     Refill:  11     Stop tizanidine    clopidogreL (PLAVIX) 75 mg tablet     Sig: Take 1 tablet (75 mg total) by mouth once daily.     Dispense:  90 tablet     Refill:  3    furosemide (LASIX) 20 MG tablet     Sig: Take 1 tablet (20 mg total) by mouth once daily.     Dispense:  90 tablet     Refill:  3    insulin detemir U-100 (LEVEMIR FLEXTOUCH U-100 INSULN) 100 unit/mL (3 mL) InPn pen     Sig: Inject 16 Units into the skin every evening. STOP NOVOLOG     Dispense:  1 Box     Refill:  11     Stop novolog    phenytoin (DILANTIN) 100 MG ER capsule     Sig: Take 1 capsule (100 mg total) by mouth 3 (three) times daily.     Dispense:  270 capsule     Refill:  3    tamsulosin (FLOMAX) 0.4 mg Cap     Sig: Take 2 capsules (0.8 mg total) by mouth once daily.     Dispense:  180 capsule     Refill:  3       Follow Up: No follow-ups on file. plan for OV 6 months, shingrix #2    Subjective:     Chief Complaint   Patient presents with    Follow-up     after labs       HPI  Miguel is a 64 y.o. male, last appointment with this clinic was Visit date not found.    Last visit 1/2020  DM2; CKD stage 4; was taking novolog 7 with meals.  Followed by nephrology  And podiatry  HTN at that time not to goal increased amlodipine    Labs at that time a1c was good. Lipid good.  Nephrology wanted renal dopplers.    3/2020 changed novolog to levemir, reduced the dose. From 21 total to 16.     6/2020 changed baclofen to tizanidine b/c of $, may need to titrate up  6/2020 back to baclofen. tizanidine SE    C/o diarrhea. Started last week. Due to the tizanidine initially he thinks. But it's persisting. Having 2 BMs daily.  No blood. No abd pain. No recent antibiotics. No questionable foods to trigger this. No sick contacts.  pepto without relief. Ongoing now about a month.     levemir 15 units. Checks glc in the AM.  By  recollection 115-120.  Denies hypoglycemia. Denies hyperglycemia.    Needs to reschedule the renal dopper. Was previously scheduled 11/2019 but no show.     Patient Care Team:  Raciel Raymond MD as PCP - General (Internal Medicine)  Gabriella Manjarrez DPM as Consulting Physician (Podiatry)  Mac Chambers Jr., MD as Consulting Physician (Urology)  Geovany Rucker MD as Consulting Physician (Neurology)  Bob Tay MD as Consulting Physician (Ophthalmology)  Tres Reyna MA as Care Coordinator    Patient Active Problem List    Diagnosis Date Noted    Type 2 diabetes mellitus with diabetic neuropathy, with long-term current use of insulin 05/10/2018    History of stroke 12/04/2017    CME (cystoid macular edema), bilateral 11/16/2017    Refractive error 11/16/2017    Post-operative state 10/06/2017    Senile nuclear sclerosis 10/05/2017    Right sided weakness 09/11/2017    Secondary hyperparathyroidism of renal origin 08/03/2017    Vitamin D deficiency 08/03/2017    Impaired functional mobility, balance, gait, and endurance 06/23/2017    Nuclear sclerosis, left 06/09/2017    Cortical cataract of both eyes 06/09/2017    DM type 2 without retinopathy 06/09/2017    Microcytosis 9/2019 labs c/w AoCD and AoCKD 03/22/2017     Seen on admission as well 2015; normal Hb, low MCV.  Normal RDW      CKD (chronic kidney disease) stage 4, GFR 15-29 ml/min with proteinuria 03/22/2017 2/27/20 renal US with dopplers no ALF.  Medical renal disease      Benign essential HTN; ACEI hyperK 03/21/2017    Pure hypercholesterolemia 03/21/2017    Controlled type 2 diabetes mellitus with stage 3 chronic kidney disease, with long-term current use of insulin 03/21/2017    Benign non-nodular prostatic hyperplasia without lower urinary tract symptoms 03/21/2017 6/26/2017 renal US mod prostatomegaly.      Seizure disorder as sequela of cerebrovascular accident 03/21/2017    Hemiplegia and hemiparesis  following cerebral infarction affecting right dominant side 03/21/2017       PAST MEDICAL HISTORY:  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    Stroke     Urinary tract infection        PAST SURGICAL HISTORY:  Past Surgical History:   Procedure Laterality Date    CATARACT EXTRACTION W/  INTRAOCULAR LENS IMPLANT Right 10/05/2017    Dr. Tay    CATARACT EXTRACTION W/  INTRAOCULAR LENS IMPLANT Left 10/19/2017    Dr. Tay    FEMORAL ARTERY STENT      KNEE SURGERY      bilateral scope       SOCIAL HISTORY:  Social History     Socioeconomic History    Marital status: Single     Spouse name: Not on file    Number of children: Not on file    Years of education: Not on file    Highest education level: Not on file   Occupational History    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, etc   Social Needs    Financial resource strain: Not on file    Food insecurity     Worry: Not on file     Inability: Not on file    Transportation needs     Medical: Not on file     Non-medical: Not on file   Tobacco Use    Smoking status: Never Smoker    Smokeless tobacco: Never Used   Substance and Sexual Activity    Alcohol use: No    Drug use: No    Sexual activity: Not on file   Lifestyle    Physical activity     Days per week: Not on file     Minutes per session: Not on file    Stress: Not on file   Relationships    Social connections     Talks on phone: Not on file     Gets together: Not on file     Attends Zoroastrian service: Not on file     Active member of club or organization: Not on file     Attends meetings of clubs or organizations: Not on file     Relationship status: Not on file   Other Topics Concern    Not on file   Social History Narrative    Not on file       ALLERGIES AND MEDICATIONS: updated and reviewed.  Review of patient's allergies indicates:   Allergen Reactions    Ace inhibitors   "    Hyperkalemia 8/2018    Penicillins Hives    Tizanidine      Felt hot       Medication List with Changes/Refills   Current Medications    AMLODIPINE (NORVASC) 10 MG TABLET    Take 1 tablet (10 mg total) by mouth once daily.    ATORVASTATIN (LIPITOR) 80 MG TABLET    Take 1 tablet (80 mg total) by mouth once daily.    BACLOFEN (LIORESAL) 10 MG TABLET    Take 1 tablet (10 mg total) by mouth 4 (four) times daily.    BLOOD-GLUCOSE METER MISC    1 each by Misc.(Non-Drug; Combo Route) route once daily. Pharmacy-whichever brand specified by insurance    CHOLECALCIFEROL, VITAMIN D3, (VITAMIN D3) 1,000 UNIT CAPSULE    Take 1 capsule (1,000 Units total) by mouth once daily.    CLOPIDOGREL (PLAVIX) 75 MG TABLET    Take 1 tablet by mouth once daily    FUROSEMIDE (LASIX) 20 MG TABLET    Take 1 tablet (20 mg total) by mouth once daily.    INSULIN DETEMIR U-100 (LEVEMIR FLEXTOUCH U-100 INSULN) 100 UNIT/ML (3 ML) SUBQ INPN PEN    Inject 16 Units into the skin every evening. STOP NOVOLOG    LANCETS (ONETOUCH DELICA LANCETS) 33 GAUGE MISC    1 lancet by Misc.(Non-Drug; Combo Route) route once daily. ONCE DAILY BLOOD SUGAR TESTING; WHICHEVER BRAND COVERED BY INSURANCE    PEN NEEDLE, DIABETIC 31 GAUGE X 1/4" NDLE    For mealtime insulin    PHENYTOIN (DILANTIN) 100 MG ER CAPSULE    TAKE 1 CAPSULE BY MOUTH THREE TIMES DAILY    TAMSULOSIN (FLOMAX) 0.4 MG CAP    Take 2 capsules by mouth once daily        Review of Systems   All other systems reviewed and are negative.      Objective:   Physical Exam   Vitals:    07/31/20 1336   BP: (!) 150/82   BP Location: Right arm   Patient Position: Sitting   BP Method: Large (Manual)   Pulse: 104   Resp: 18   Temp: 97.7 °F (36.5 °C)   TempSrc: Temporal   SpO2: 97%   Weight: 98.7 kg (217 lb 9.5 oz)   Height: 5' 8" (1.727 m)    Body mass index is 33.09 kg/m².  Weight: 98.7 kg (217 lb 9.5 oz)   Height: 5' 8" (172.7 cm)     Physical Exam  Constitutional:       General: He is not in acute distress.     " Appearance: He is well-developed.   HENT:      Head: Normocephalic and atraumatic.   Cardiovascular:      Rate and Rhythm: Normal rate and regular rhythm.      Heart sounds: Normal heart sounds. No murmur.   Pulmonary:      Effort: Pulmonary effort is normal.      Breath sounds: Normal breath sounds.   Abdominal:      General: Abdomen is flat. There is no distension.      Tenderness: There is no abdominal tenderness. There is no guarding.   Musculoskeletal:      Right lower leg: Edema present.      Left lower leg: Edema present.      Comments: Edema right greater than left.  Pitting.  Warm and perfused.  Right hand chronic flexion contracture  Right elbow full chronic flexion contracture  Edema of the right arm   Skin:     General: Skin is warm and dry.   Neurological:      Mental Status: He is alert and oriented to person, place, and time.      Coordination: Coordination normal.      Comments: Right hemiplegia baseline   Psychiatric:         Behavior: Behavior normal.         Thought Content: Thought content normal.

## 2020-07-31 ENCOUNTER — OFFICE VISIT (OUTPATIENT)
Dept: FAMILY MEDICINE | Facility: CLINIC | Age: 65
End: 2020-07-31
Payer: MEDICARE

## 2020-07-31 VITALS
OXYGEN SATURATION: 97 % | TEMPERATURE: 98 F | HEIGHT: 68 IN | BODY MASS INDEX: 32.98 KG/M2 | SYSTOLIC BLOOD PRESSURE: 132 MMHG | WEIGHT: 217.63 LBS | DIASTOLIC BLOOD PRESSURE: 78 MMHG | HEART RATE: 104 BPM | RESPIRATION RATE: 18 BRPM

## 2020-07-31 DIAGNOSIS — I10 BENIGN ESSENTIAL HTN: Chronic | ICD-10-CM

## 2020-07-31 DIAGNOSIS — E78.00 PURE HYPERCHOLESTEROLEMIA: ICD-10-CM

## 2020-07-31 DIAGNOSIS — E11.9 DM TYPE 2 WITHOUT RETINOPATHY: ICD-10-CM

## 2020-07-31 DIAGNOSIS — Z86.73 HISTORY OF STROKE: ICD-10-CM

## 2020-07-31 DIAGNOSIS — G40.909 SEIZURE DISORDER AS SEQUELA OF CEREBROVASCULAR ACCIDENT: Chronic | ICD-10-CM

## 2020-07-31 DIAGNOSIS — I69.351 HEMIPLEGIA AND HEMIPARESIS FOLLOWING CEREBRAL INFARCTION AFFECTING RIGHT DOMINANT SIDE: Chronic | ICD-10-CM

## 2020-07-31 DIAGNOSIS — R19.7 DIARRHEA, UNSPECIFIED TYPE: ICD-10-CM

## 2020-07-31 DIAGNOSIS — N18.30 CONTROLLED TYPE 2 DIABETES MELLITUS WITH STAGE 3 CHRONIC KIDNEY DISEASE, WITH LONG-TERM CURRENT USE OF INSULIN: Primary | Chronic | ICD-10-CM

## 2020-07-31 DIAGNOSIS — E11.22 CONTROLLED TYPE 2 DIABETES MELLITUS WITH STAGE 3 CHRONIC KIDNEY DISEASE, WITH LONG-TERM CURRENT USE OF INSULIN: Primary | Chronic | ICD-10-CM

## 2020-07-31 DIAGNOSIS — Z79.4 TYPE 2 DIABETES MELLITUS WITH DIABETIC NEUROPATHY, WITH LONG-TERM CURRENT USE OF INSULIN: ICD-10-CM

## 2020-07-31 DIAGNOSIS — N18.4 CKD (CHRONIC KIDNEY DISEASE) STAGE 4, GFR 15-29 ML/MIN: ICD-10-CM

## 2020-07-31 DIAGNOSIS — R71.8 MICROCYTOSIS: ICD-10-CM

## 2020-07-31 DIAGNOSIS — I69.398 SEIZURE DISORDER AS SEQUELA OF CEREBROVASCULAR ACCIDENT: Chronic | ICD-10-CM

## 2020-07-31 DIAGNOSIS — R60.0 PERIPHERAL EDEMA: ICD-10-CM

## 2020-07-31 DIAGNOSIS — R35.89 POLYURIA: ICD-10-CM

## 2020-07-31 DIAGNOSIS — Z12.11 SCREENING FOR COLON CANCER: ICD-10-CM

## 2020-07-31 DIAGNOSIS — Z79.4 CONTROLLED TYPE 2 DIABETES MELLITUS WITH STAGE 3 CHRONIC KIDNEY DISEASE, WITH LONG-TERM CURRENT USE OF INSULIN: Primary | Chronic | ICD-10-CM

## 2020-07-31 DIAGNOSIS — N25.81 SECONDARY HYPERPARATHYROIDISM OF RENAL ORIGIN: ICD-10-CM

## 2020-07-31 DIAGNOSIS — Z23 NEED FOR SHINGLES VACCINE: ICD-10-CM

## 2020-07-31 DIAGNOSIS — E11.40 TYPE 2 DIABETES MELLITUS WITH DIABETIC NEUROPATHY, WITH LONG-TERM CURRENT USE OF INSULIN: ICD-10-CM

## 2020-07-31 PROCEDURE — 99214 PR OFFICE/OUTPT VISIT, EST, LEVL IV, 30-39 MIN: ICD-10-PCS | Mod: S$PBB,,, | Performed by: INTERNAL MEDICINE

## 2020-07-31 PROCEDURE — 99999 PR PBB SHADOW E&M-EST. PATIENT-LVL IV: ICD-10-PCS | Mod: PBBFAC,,, | Performed by: INTERNAL MEDICINE

## 2020-07-31 PROCEDURE — 90750 HZV VACC RECOMBINANT IM: CPT | Mod: PBBFAC,PO

## 2020-07-31 PROCEDURE — 2024F 7 FLD RTA PHOTO EVC RTNOPTHY: CPT | Mod: ,,, | Performed by: INTERNAL MEDICINE

## 2020-07-31 PROCEDURE — 99214 OFFICE O/P EST MOD 30 MIN: CPT | Mod: PBBFAC,PO | Performed by: INTERNAL MEDICINE

## 2020-07-31 PROCEDURE — 99999 PR PBB SHADOW E&M-EST. PATIENT-LVL IV: CPT | Mod: PBBFAC,,, | Performed by: INTERNAL MEDICINE

## 2020-07-31 PROCEDURE — 99214 OFFICE O/P EST MOD 30 MIN: CPT | Mod: S$PBB,,, | Performed by: INTERNAL MEDICINE

## 2020-07-31 PROCEDURE — 2024F PR 7 FIELD PHOTOS WITH INTERP/ REVIEW: ICD-10-PCS | Mod: ,,, | Performed by: INTERNAL MEDICINE

## 2020-07-31 RX ORDER — ATORVASTATIN CALCIUM 80 MG/1
80 TABLET, FILM COATED ORAL DAILY
Qty: 90 TABLET | Refills: 3 | Status: SHIPPED | OUTPATIENT
Start: 2020-07-31 | End: 2020-07-31 | Stop reason: SDUPTHER

## 2020-07-31 RX ORDER — TAMSULOSIN HYDROCHLORIDE 0.4 MG/1
2 CAPSULE ORAL DAILY
Qty: 180 CAPSULE | Refills: 3 | Status: SHIPPED | OUTPATIENT
Start: 2020-07-31 | End: 2020-11-19 | Stop reason: SDUPTHER

## 2020-07-31 RX ORDER — ATORVASTATIN CALCIUM 80 MG/1
80 TABLET, FILM COATED ORAL DAILY
Qty: 90 TABLET | Refills: 3 | Status: SHIPPED | OUTPATIENT
Start: 2020-07-31 | End: 2021-03-30 | Stop reason: SDUPTHER

## 2020-07-31 RX ORDER — AMLODIPINE BESYLATE 10 MG/1
10 TABLET ORAL DAILY
Qty: 90 TABLET | Refills: 3 | Status: SHIPPED | OUTPATIENT
Start: 2020-07-31 | End: 2021-03-30 | Stop reason: SDUPTHER

## 2020-07-31 RX ORDER — BACLOFEN 10 MG/1
10 TABLET ORAL 4 TIMES DAILY
Qty: 120 TABLET | Refills: 11 | Status: ON HOLD | OUTPATIENT
Start: 2020-07-31 | End: 2020-08-31 | Stop reason: HOSPADM

## 2020-07-31 RX ORDER — INSULIN DETEMIR 100 [IU]/ML
16 INJECTION, SOLUTION SUBCUTANEOUS NIGHTLY
Qty: 1 BOX | Refills: 11 | Status: SHIPPED | OUTPATIENT
Start: 2020-07-31 | End: 2020-08-13 | Stop reason: SDUPTHER

## 2020-07-31 RX ORDER — PHENYTOIN SODIUM 100 MG/1
100 CAPSULE, EXTENDED RELEASE ORAL 3 TIMES DAILY
Qty: 270 CAPSULE | Refills: 3 | Status: SHIPPED | OUTPATIENT
Start: 2020-07-31 | End: 2020-12-15 | Stop reason: SDUPTHER

## 2020-07-31 RX ORDER — CLOPIDOGREL BISULFATE 75 MG/1
75 TABLET ORAL DAILY
Qty: 90 TABLET | Refills: 3 | Status: SHIPPED | OUTPATIENT
Start: 2020-07-31 | End: 2021-03-30 | Stop reason: SDUPTHER

## 2020-07-31 RX ORDER — FUROSEMIDE 20 MG/1
20 TABLET ORAL DAILY
Qty: 90 TABLET | Refills: 3 | Status: ON HOLD | OUTPATIENT
Start: 2020-07-31 | End: 2020-08-31 | Stop reason: HOSPADM

## 2020-07-31 NOTE — PATIENT INSTRUCTIONS
TRY IMMODIUM OVER THE COUNTER FOR THE DIARRHEA    IF THE DIARRHEA DOES NOT GO AWAY - PLEASE CALL ME AND DO A STOOL SAMPLE COLLECTION

## 2020-08-13 ENCOUNTER — OFFICE VISIT (OUTPATIENT)
Dept: FAMILY MEDICINE | Facility: CLINIC | Age: 65
DRG: 699 | End: 2020-08-13
Payer: MEDICARE

## 2020-08-13 ENCOUNTER — HOSPITAL ENCOUNTER (OUTPATIENT)
Dept: RADIOLOGY | Facility: HOSPITAL | Age: 65
Discharge: HOME | DRG: 699 | End: 2020-08-13
Attending: NURSE PRACTITIONER
Payer: MEDICARE

## 2020-08-13 ENCOUNTER — TELEPHONE (OUTPATIENT)
Dept: FAMILY MEDICINE | Facility: CLINIC | Age: 65
End: 2020-08-13

## 2020-08-13 VITALS
WEIGHT: 214.19 LBS | SYSTOLIC BLOOD PRESSURE: 160 MMHG | DIASTOLIC BLOOD PRESSURE: 80 MMHG | HEART RATE: 100 BPM | OXYGEN SATURATION: 98 % | TEMPERATURE: 98 F | BODY MASS INDEX: 32.46 KG/M2 | HEIGHT: 68 IN

## 2020-08-13 DIAGNOSIS — I69.351 HEMIPLEGIA AND HEMIPARESIS FOLLOWING CEREBRAL INFARCTION AFFECTING RIGHT DOMINANT SIDE: ICD-10-CM

## 2020-08-13 DIAGNOSIS — N18.4 CKD (CHRONIC KIDNEY DISEASE) STAGE 4, GFR 15-29 ML/MIN: ICD-10-CM

## 2020-08-13 DIAGNOSIS — G40.909 SEIZURE DISORDER AS SEQUELA OF CEREBROVASCULAR ACCIDENT: ICD-10-CM

## 2020-08-13 DIAGNOSIS — M79.89 RIGHT LEG SWELLING: ICD-10-CM

## 2020-08-13 DIAGNOSIS — I69.398 SEIZURE DISORDER AS SEQUELA OF CEREBROVASCULAR ACCIDENT: ICD-10-CM

## 2020-08-13 DIAGNOSIS — E11.22 CONTROLLED TYPE 2 DIABETES MELLITUS WITH STAGE 3 CHRONIC KIDNEY DISEASE, WITH LONG-TERM CURRENT USE OF INSULIN: Chronic | ICD-10-CM

## 2020-08-13 DIAGNOSIS — M79.89 RIGHT LEG SWELLING: Primary | ICD-10-CM

## 2020-08-13 DIAGNOSIS — R60.0 LOCALIZED EDEMA: ICD-10-CM

## 2020-08-13 DIAGNOSIS — Z79.4 CONTROLLED TYPE 2 DIABETES MELLITUS WITH STAGE 3 CHRONIC KIDNEY DISEASE, WITH LONG-TERM CURRENT USE OF INSULIN: Chronic | ICD-10-CM

## 2020-08-13 DIAGNOSIS — E11.9 DM TYPE 2 WITHOUT RETINOPATHY: ICD-10-CM

## 2020-08-13 DIAGNOSIS — N18.30 CONTROLLED TYPE 2 DIABETES MELLITUS WITH STAGE 3 CHRONIC KIDNEY DISEASE, WITH LONG-TERM CURRENT USE OF INSULIN: Chronic | ICD-10-CM

## 2020-08-13 PROCEDURE — 2024F PR 7 FIELD PHOTOS WITH INTERP/ REVIEW: ICD-10-PCS | Mod: ,,, | Performed by: NURSE PRACTITIONER

## 2020-08-13 PROCEDURE — 99999 PR PBB SHADOW E&M-EST. PATIENT-LVL V: CPT | Mod: PBBFAC,,, | Performed by: NURSE PRACTITIONER

## 2020-08-13 PROCEDURE — 2024F 7 FLD RTA PHOTO EVC RTNOPTHY: CPT | Mod: ,,, | Performed by: NURSE PRACTITIONER

## 2020-08-13 PROCEDURE — 99214 PR OFFICE/OUTPT VISIT, EST, LEVL IV, 30-39 MIN: ICD-10-PCS | Mod: S$PBB,,, | Performed by: NURSE PRACTITIONER

## 2020-08-13 PROCEDURE — 93971 EXTREMITY STUDY: CPT | Mod: 26,RT,, | Performed by: RADIOLOGY

## 2020-08-13 PROCEDURE — 93971 EXTREMITY STUDY: CPT | Mod: TC,RT

## 2020-08-13 PROCEDURE — 93971 US LOWER EXTREMITY VEINS RIGHT: ICD-10-PCS | Mod: 26,RT,, | Performed by: RADIOLOGY

## 2020-08-13 PROCEDURE — 99215 OFFICE O/P EST HI 40 MIN: CPT | Mod: PBBFAC,25,PO | Performed by: NURSE PRACTITIONER

## 2020-08-13 PROCEDURE — 99214 OFFICE O/P EST MOD 30 MIN: CPT | Mod: S$PBB,,, | Performed by: NURSE PRACTITIONER

## 2020-08-13 PROCEDURE — 99999 PR PBB SHADOW E&M-EST. PATIENT-LVL V: ICD-10-PCS | Mod: PBBFAC,,, | Performed by: NURSE PRACTITIONER

## 2020-08-13 RX ORDER — FUROSEMIDE 20 MG/1
TABLET ORAL
Qty: 120 TABLET | Refills: 0 | Status: ON HOLD | OUTPATIENT
Start: 2020-08-13 | End: 2020-08-31 | Stop reason: HOSPADM

## 2020-08-13 RX ORDER — INSULIN DETEMIR 100 [IU]/ML
16 INJECTION, SOLUTION SUBCUTANEOUS NIGHTLY
Qty: 1 BOX | Refills: 11 | Status: SHIPPED | OUTPATIENT
Start: 2020-08-13 | End: 2020-11-19

## 2020-08-13 NOTE — TELEPHONE ENCOUNTER
I as well as SHAYLA Montano also have been attempting to leave messages for the patient, his VM is full, I called his SO Tracey Fisher's cellphone and left VM, and called her home phone, someone else answered, I asked him to relay message to her to call her doctor's office ASAP (she is also my patient).

## 2020-08-13 NOTE — PROGRESS NOTES
Subjective:       Patient ID: Miguel Moore is a 65 y.o. male.    Chief Complaint: Edema (arm)    65-year-old male presents to the clinic today complaint of swelling to left leg and the right hand x1 week.  He denies any chest pain, shortness of breath, or heart palpitations.  He denies any headaches, dizziness, or blurred vision.  He states his blood sugar this morning was 120.  He is currently taking Lasix 20 mg 1 twice a day.  He denies any calf pain.  He has a mild cough.  However, he denies any wheezing.  He denies any fever, chills, sore throat, sinus congestion, ear pain, abdominal pain, nausea, vomiting, or diarrhea.  His wife states that they watch the salt closely in his diet.    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 Tay    CATARACT EXTRACTION W/  INTRAOCULAR LENS IMPLANT Left 10/19/2017    Dr. Tay    FEMORAL ARTERY STENT      KNEE SURGERY      bilateral scope      reports that he has never smoked. He has never used smokeless tobacco. He reports that he does not drink alcohol or use drugs.  Review of Systems   Constitutional: Negative for chills and fever.   HENT: Negative for congestion, ear pain, postnasal drip, sinus pressure and sore throat.    Respiratory: Positive for cough. Negative for shortness of breath and wheezing.    Cardiovascular: Positive for leg swelling. Negative for chest pain and palpitations.   Gastrointestinal: Negative for abdominal pain, blood in stool, constipation, diarrhea, nausea and vomiting.   Musculoskeletal: Positive for gait problem.   Skin: Negative for rash.   Neurological: Negative for dizziness, light-headedness and headaches.        Right-sided hemiparesis        Objective:      Physical  Exam  Constitutional:       General: He is not in acute distress.     Appearance: Normal appearance. He is not ill-appearing, toxic-appearing or diaphoretic.   Cardiovascular:      Rate and Rhythm: Tachycardia present.      Heart sounds: Normal heart sounds. No murmur.      Comments: Tachycardia   Pulmonary:      Effort: Pulmonary effort is normal.      Breath sounds: Normal breath sounds. No wheezing or rhonchi.   Abdominal:      General: Bowel sounds are normal.      Palpations: Abdomen is soft.      Tenderness: There is no abdominal tenderness.   Musculoskeletal:      Right lower leg: Edema present.      Left lower leg: Edema present.      Comments: Swelling to both lower extremities but right leg more swollen than left leg swelling noted right hand right leg no calf tenderness negative dayday's sign    Neurological:      Mental Status: He is alert and oriented to person, place, and time.      Comments: Right-sided hemiparesis          Assessment:       1. Right leg swelling    2. Controlled type 2 diabetes mellitus with stage 3 chronic kidney disease, with long-term current use of insulin    3. CKD (chronic kidney disease) stage 4, GFR 15-29 ml/min with proteinuria    4. DM type 2 without retinopathy    5. Seizure disorder as sequela of cerebrovascular accident    6. Hemiplegia and hemiparesis following cerebral infarction affecting right dominant side    7. Localized edema         Plan:         Right leg swelling  -     US Lower Extremity Veins Right; Future; Expected date: 08/13/2020    Controlled type 2 diabetes mellitus with stage 3 chronic kidney disease, with long-term current use of insulin  -     insulin detemir U-100 (LEVEMIR FLEXTOUCH U-100 INSULN) 100 unit/mL (3 mL) InPn pen; Inject 16 Units into the skin every evening. STOP NOVOLOG  Dispense: 1 Box; Refill: 11  - continue current medications  - continue monitoring blood sugars closely     CKD (chronic kidney disease) stage 4, GFR 15-29 ml/min with  proteinuria  - avoid all anti-inflammatories and stay well hydrated    DM type 2 without retinopathy  - yearly eye exams    Seizure disorder as sequela of cerebrovascular accident  - The current medical regimen is effective;  continue present plan and medications.    Hemiplegia and hemiparesis following cerebral infarction affecting right dominant side  - stable      Localized edema   -     US Lower Extremity Veins Right; Future; Expected date: 08/13/2020    Other orders  -     furosemide (LASIX) 20 MG tablet; Take two tablets twice a day  Dispense: 120 tablet; Refill: 0    - Patient was also evaluated per Dr. Raymond and discussed treatment plan.

## 2020-08-13 NOTE — PATIENT INSTRUCTIONS
Stat ultrasound of right leg rule out DVT  Increase Lasix 20 mg take two twice a day   Elevated right hand   Follow up with Dr. Raymond next week

## 2020-08-13 NOTE — TELEPHONE ENCOUNTER
Spoke with Silas at the lab and he report critical calcium level at 6.0. Advise by provider to have patient go to the emergency room. Attempt to contact patient by listed number on profile. Unable to leave message. Mailbox full. Attempt call to Tracey Devine (significant other) [phone]. Left message on answering machine to return call to clinic. Attempt call to sister Juana Stock at [phone]. No answer.   Detail Level: Detailed X Size Of Lesion In Cm (Optional): 0

## 2020-08-13 NOTE — TELEPHONE ENCOUNTER
I texted his phone to let him know that his ultrasound was normal and we caught a call from lab of some critical lab values and he needs to the ER tonight for further evaluation. I also left a message on the wife's voice mail.

## 2020-08-14 ENCOUNTER — TELEPHONE (OUTPATIENT)
Dept: FAMILY MEDICINE | Facility: CLINIC | Age: 65
End: 2020-08-14

## 2020-08-14 NOTE — TELEPHONE ENCOUNTER
Patient was advised that all his medications as directed expected for the lasix. Restated that his needs to go to the ED. Lasix will not work being patient is in kidney failure.

## 2020-08-14 NOTE — TELEPHONE ENCOUNTER
----- Message from Raciel Raymond MD sent at 8/14/2020  7:48 AM CDT -----  Regarding: please direct pt to go to ER

## 2020-08-14 NOTE — TELEPHONE ENCOUNTER
----- Message from Emmanuel Coles, Patient Care Assistant sent at 8/14/2020 11:27 AM CDT -----  Name of Who is Calling: TRACI CHAO [[id number]]    What is the request in detail: Requesting a call back in regards of instructions and should he take medications before going to emergency room. Please contact to further discuss and advise      Can the clinic reply by [facility]: No    What Number to Call Back if not in [facility]:   [phone]

## 2020-08-14 NOTE — TELEPHONE ENCOUNTER
Patient was advised that he needs to go to the ED due to critical labs (kidney failure). Patient verbally understands.

## 2020-08-15 ENCOUNTER — HOSPITAL ENCOUNTER (INPATIENT)
Facility: HOSPITAL | Age: 65
LOS: 16 days | Discharge: NURSING, REHAB OR LONG-TERM CARE | DRG: 699 | End: 2020-09-01
Attending: EMERGENCY MEDICINE
Payer: MEDICARE

## 2020-08-15 DIAGNOSIS — G40.909 SEIZURE DISORDER AS SEQUELA OF CEREBROVASCULAR ACCIDENT: Chronic | ICD-10-CM

## 2020-08-15 DIAGNOSIS — Z79.4 CONTROLLED TYPE 2 DIABETES MELLITUS WITH STAGE 4 CHRONIC KIDNEY DISEASE, WITH LONG-TERM CURRENT USE OF INSULIN: Chronic | ICD-10-CM

## 2020-08-15 DIAGNOSIS — N40.0 ENLARGED PROSTATE: ICD-10-CM

## 2020-08-15 DIAGNOSIS — E78.00 PURE HYPERCHOLESTEROLEMIA: Chronic | ICD-10-CM

## 2020-08-15 DIAGNOSIS — E11.40 TYPE 2 DIABETES MELLITUS WITH DIABETIC NEUROPATHY, WITH LONG-TERM CURRENT USE OF INSULIN: ICD-10-CM

## 2020-08-15 DIAGNOSIS — M62.82 NON-TRAUMATIC RHABDOMYOLYSIS: ICD-10-CM

## 2020-08-15 DIAGNOSIS — D64.9 ANEMIA, UNSPECIFIED TYPE: ICD-10-CM

## 2020-08-15 DIAGNOSIS — E87.5 HYPERKALEMIA: ICD-10-CM

## 2020-08-15 DIAGNOSIS — N40.0 BENIGN NON-NODULAR PROSTATIC HYPERPLASIA WITHOUT LOWER URINARY TRACT SYMPTOMS: Chronic | ICD-10-CM

## 2020-08-15 DIAGNOSIS — E11.22 CONTROLLED TYPE 2 DIABETES MELLITUS WITH STAGE 4 CHRONIC KIDNEY DISEASE, WITH LONG-TERM CURRENT USE OF INSULIN: Chronic | ICD-10-CM

## 2020-08-15 DIAGNOSIS — B37.7 CANDIDEMIA: ICD-10-CM

## 2020-08-15 DIAGNOSIS — N04.9 NEPHROTIC SYNDROME: ICD-10-CM

## 2020-08-15 DIAGNOSIS — B49 FUNGEMIA: ICD-10-CM

## 2020-08-15 DIAGNOSIS — I69.398 SEIZURE DISORDER AS SEQUELA OF CEREBROVASCULAR ACCIDENT: Chronic | ICD-10-CM

## 2020-08-15 DIAGNOSIS — Z79.4 TYPE 2 DIABETES MELLITUS WITH DIABETIC NEUROPATHY, WITH LONG-TERM CURRENT USE OF INSULIN: ICD-10-CM

## 2020-08-15 DIAGNOSIS — R60.1 ANASARCA: ICD-10-CM

## 2020-08-15 DIAGNOSIS — I69.351 HEMIPLEGIA AND HEMIPARESIS FOLLOWING CEREBRAL INFARCTION AFFECTING RIGHT DOMINANT SIDE: Chronic | ICD-10-CM

## 2020-08-15 DIAGNOSIS — E83.51 HYPOCALCEMIA: ICD-10-CM

## 2020-08-15 DIAGNOSIS — I10 BENIGN ESSENTIAL HTN: Chronic | ICD-10-CM

## 2020-08-15 DIAGNOSIS — B37.9 CANDIDA PARAPSILOSIS INFECTION: Primary | ICD-10-CM

## 2020-08-15 DIAGNOSIS — N18.6 ESRD (END STAGE RENAL DISEASE): ICD-10-CM

## 2020-08-15 DIAGNOSIS — D64.9 ANEMIA: ICD-10-CM

## 2020-08-15 DIAGNOSIS — N18.4 CONTROLLED TYPE 2 DIABETES MELLITUS WITH STAGE 4 CHRONIC KIDNEY DISEASE, WITH LONG-TERM CURRENT USE OF INSULIN: Chronic | ICD-10-CM

## 2020-08-15 DIAGNOSIS — N17.9 AKI (ACUTE KIDNEY INJURY): ICD-10-CM

## 2020-08-15 LAB
ALBUMIN SERPL BCP-MCNC: 2.4 G/DL (ref 3.5–5.2)
ALP SERPL-CCNC: 98 U/L (ref 55–135)
ALT SERPL W/O P-5'-P-CCNC: 13 U/L (ref 10–44)
ANION GAP SERPL CALC-SCNC: 18 MMOL/L (ref 8–16)
ANION GAP SERPL CALC-SCNC: 9 MMOL/L (ref 8–16)
AST SERPL-CCNC: 13 U/L (ref 10–40)
BACTERIA #/AREA URNS HPF: NORMAL /HPF
BASOPHILS # BLD AUTO: 0.03 K/UL (ref 0–0.2)
BASOPHILS NFR BLD: 0.6 % (ref 0–1.9)
BILIRUB SERPL-MCNC: 0.1 MG/DL (ref 0.1–1)
BILIRUB UR QL STRIP: NEGATIVE
BNP SERPL-MCNC: 103 PG/ML (ref 0–99)
BUN SERPL-MCNC: 57 MG/DL (ref 6–30)
BUN SERPL-MCNC: 64 MG/DL (ref 8–23)
CALCIUM SERPL-MCNC: 5.7 MG/DL (ref 8.7–10.5)
CHLORIDE SERPL-SCNC: 110 MMOL/L (ref 95–110)
CHLORIDE SERPL-SCNC: 111 MMOL/L (ref 95–110)
CHLORIDE UR-SCNC: 72 MMOL/L (ref 25–200)
CK SERPL-CCNC: 1262 U/L (ref 20–200)
CLARITY UR: CLEAR
CO2 SERPL-SCNC: 17 MMOL/L (ref 23–29)
COLOR UR: ABNORMAL
CREAT SERPL-MCNC: 5.3 MG/DL (ref 0.5–1.4)
CREAT SERPL-MCNC: 5.5 MG/DL (ref 0.5–1.4)
CREAT UR-MCNC: 75.2 MG/DL (ref 23–375)
CREAT UR-MCNC: 75.2 MG/DL (ref 23–375)
DIFFERENTIAL METHOD: ABNORMAL
EOSINOPHIL # BLD AUTO: 0.1 K/UL (ref 0–0.5)
EOSINOPHIL NFR BLD: 1.8 % (ref 0–8)
ERYTHROCYTE [DISTWIDTH] IN BLOOD BY AUTOMATED COUNT: 14.9 % (ref 11.5–14.5)
EST. GFR  (AFRICAN AMERICAN): 12 ML/MIN/1.73 M^2
EST. GFR  (NON AFRICAN AMERICAN): 10 ML/MIN/1.73 M^2
GLUCOSE SERPL-MCNC: 109 MG/DL (ref 70–110)
GLUCOSE SERPL-MCNC: 151 MG/DL (ref 70–110)
GLUCOSE UR QL STRIP: ABNORMAL
HCT VFR BLD AUTO: 22.7 % (ref 40–54)
HCT VFR BLD CALC: 25 %PCV (ref 36–54)
HGB BLD-MCNC: 7.5 G/DL (ref 14–18)
HGB UR QL STRIP: ABNORMAL
HYALINE CASTS #/AREA URNS LPF: 0 /LPF
IMM GRANULOCYTES # BLD AUTO: 0.01 K/UL (ref 0–0.04)
IMM GRANULOCYTES NFR BLD AUTO: 0.2 % (ref 0–0.5)
KETONES UR QL STRIP: NEGATIVE
LEUKOCYTE ESTERASE UR QL STRIP: NEGATIVE
LYMPHOCYTES # BLD AUTO: 0.5 K/UL (ref 1–4.8)
LYMPHOCYTES NFR BLD: 10 % (ref 18–48)
MAGNESIUM SERPL-MCNC: 1.3 MG/DL (ref 1.6–2.6)
MCH RBC QN AUTO: 25.5 PG (ref 27–31)
MCHC RBC AUTO-ENTMCNC: 33 G/DL (ref 32–36)
MCV RBC AUTO: 77 FL (ref 82–98)
MICROSCOPIC COMMENT: NORMAL
MONOCYTES # BLD AUTO: 0.4 K/UL (ref 0.3–1)
MONOCYTES NFR BLD: 8.4 % (ref 4–15)
NEUTROPHILS # BLD AUTO: 4 K/UL (ref 1.8–7.7)
NEUTROPHILS NFR BLD: 79 % (ref 38–73)
NITRITE UR QL STRIP: NEGATIVE
NRBC BLD-RTO: 0 /100 WBC
PH UR STRIP: 6 [PH] (ref 5–8)
PHOSPHATE SERPL-MCNC: 5.6 MG/DL (ref 2.7–4.5)
PLATELET # BLD AUTO: 165 K/UL (ref 150–350)
PMV BLD AUTO: 10 FL (ref 9.2–12.9)
POC IONIZED CALCIUM: 0.82 MMOL/L (ref 1.06–1.42)
POC TCO2 (MEASURED): 18 MMOL/L (ref 23–29)
POCT GLUCOSE: 69 MG/DL (ref 70–110)
POTASSIUM BLD-SCNC: 5.7 MMOL/L (ref 3.5–5.1)
POTASSIUM SERPL-SCNC: 5.4 MMOL/L (ref 3.5–5.1)
PROT SERPL-MCNC: 5.4 G/DL (ref 6–8.4)
PROT UR QL STRIP: ABNORMAL
PROT UR-MCNC: 571 MG/DL
PROT UR-MCNC: 571 MG/DL
PROT/CREAT UR: 7.59 MG/G{CREAT} (ref 0–0.2)
RBC # BLD AUTO: 2.94 M/UL (ref 4.6–6.2)
RBC #/AREA URNS HPF: 1 /HPF (ref 0–4)
SAMPLE: ABNORMAL
SARS-COV-2 RDRP RESP QL NAA+PROBE: NEGATIVE
SODIUM BLD-SCNC: 139 MMOL/L (ref 136–145)
SODIUM SERPL-SCNC: 137 MMOL/L (ref 136–145)
SODIUM UR-SCNC: 80 MMOL/L (ref 20–250)
SP GR UR STRIP: 1.01 (ref 1–1.03)
URN SPEC COLLECT METH UR: ABNORMAL
UROBILINOGEN UR STRIP-ACNC: NEGATIVE EU/DL
WBC # BLD AUTO: 5 K/UL (ref 3.9–12.7)
WBC #/AREA URNS HPF: 1 /HPF (ref 0–5)

## 2020-08-15 PROCEDURE — 93010 EKG 12-LEAD: ICD-10-PCS | Mod: ,,, | Performed by: INTERNAL MEDICINE

## 2020-08-15 PROCEDURE — G0378 HOSPITAL OBSERVATION PER HR: HCPCS

## 2020-08-15 PROCEDURE — U0002 COVID-19 LAB TEST NON-CDC: HCPCS

## 2020-08-15 PROCEDURE — 81000 URINALYSIS NONAUTO W/SCOPE: CPT

## 2020-08-15 PROCEDURE — 99900035 HC TECH TIME PER 15 MIN (STAT)

## 2020-08-15 PROCEDURE — 63600175 PHARM REV CODE 636 W HCPCS: Performed by: EMERGENCY MEDICINE

## 2020-08-15 PROCEDURE — 25000003 PHARM REV CODE 250: Performed by: HOSPITALIST

## 2020-08-15 PROCEDURE — 93005 ELECTROCARDIOGRAM TRACING: CPT

## 2020-08-15 PROCEDURE — 85014 HEMATOCRIT: CPT

## 2020-08-15 PROCEDURE — 83735 ASSAY OF MAGNESIUM: CPT

## 2020-08-15 PROCEDURE — 82803 BLOOD GASES ANY COMBINATION: CPT

## 2020-08-15 PROCEDURE — 82570 ASSAY OF URINE CREATININE: CPT

## 2020-08-15 PROCEDURE — 36415 COLL VENOUS BLD VENIPUNCTURE: CPT

## 2020-08-15 PROCEDURE — 82565 ASSAY OF CREATININE: CPT

## 2020-08-15 PROCEDURE — 84105 ASSAY OF URINE PHOSPHORUS: CPT

## 2020-08-15 PROCEDURE — 85025 COMPLETE CBC W/AUTO DIFF WBC: CPT

## 2020-08-15 PROCEDURE — 93010 ELECTROCARDIOGRAM REPORT: CPT | Mod: ,,, | Performed by: INTERNAL MEDICINE

## 2020-08-15 PROCEDURE — 82550 ASSAY OF CK (CPK): CPT

## 2020-08-15 PROCEDURE — 83605 ASSAY OF LACTIC ACID: CPT

## 2020-08-15 PROCEDURE — 80053 COMPREHEN METABOLIC PANEL: CPT

## 2020-08-15 PROCEDURE — 96372 THER/PROPH/DIAG INJ SC/IM: CPT | Mod: 59

## 2020-08-15 PROCEDURE — 80186 ASSAY OF PHENYTOIN FREE: CPT

## 2020-08-15 PROCEDURE — 82436 ASSAY OF URINE CHLORIDE: CPT

## 2020-08-15 PROCEDURE — 84300 ASSAY OF URINE SODIUM: CPT

## 2020-08-15 PROCEDURE — 84100 ASSAY OF PHOSPHORUS: CPT

## 2020-08-15 PROCEDURE — 84132 ASSAY OF SERUM POTASSIUM: CPT

## 2020-08-15 PROCEDURE — 84295 ASSAY OF SERUM SODIUM: CPT

## 2020-08-15 PROCEDURE — 25000003 PHARM REV CODE 250: Performed by: EMERGENCY MEDICINE

## 2020-08-15 PROCEDURE — 63600175 PHARM REV CODE 636 W HCPCS: Performed by: HOSPITALIST

## 2020-08-15 PROCEDURE — 96374 THER/PROPH/DIAG INJ IV PUSH: CPT

## 2020-08-15 PROCEDURE — 83880 ASSAY OF NATRIURETIC PEPTIDE: CPT

## 2020-08-15 PROCEDURE — 99291 CRITICAL CARE FIRST HOUR: CPT | Mod: 25

## 2020-08-15 PROCEDURE — 82652 VIT D 1 25-DIHYDROXY: CPT

## 2020-08-15 RX ORDER — TAMSULOSIN HYDROCHLORIDE 0.4 MG/1
2 CAPSULE ORAL DAILY
Status: DISCONTINUED | OUTPATIENT
Start: 2020-08-16 | End: 2020-09-01 | Stop reason: HOSPADM

## 2020-08-15 RX ORDER — AMLODIPINE BESYLATE 5 MG/1
10 TABLET ORAL DAILY
Status: DISCONTINUED | OUTPATIENT
Start: 2020-08-16 | End: 2020-08-16

## 2020-08-15 RX ORDER — TALC
6 POWDER (GRAM) TOPICAL NIGHTLY PRN
Status: DISCONTINUED | OUTPATIENT
Start: 2020-08-15 | End: 2020-08-24

## 2020-08-15 RX ORDER — HEPARIN SODIUM 5000 [USP'U]/ML
5000 INJECTION, SOLUTION INTRAVENOUS; SUBCUTANEOUS EVERY 8 HOURS
Status: DISCONTINUED | OUTPATIENT
Start: 2020-08-15 | End: 2020-08-17

## 2020-08-15 RX ORDER — SODIUM CHLORIDE 0.9 % (FLUSH) 0.9 %
10 SYRINGE (ML) INJECTION
Status: DISCONTINUED | OUTPATIENT
Start: 2020-08-15 | End: 2020-09-01 | Stop reason: HOSPADM

## 2020-08-15 RX ORDER — PHENYTOIN SODIUM 100 MG/1
100 CAPSULE, EXTENDED RELEASE ORAL 3 TIMES DAILY
Status: DISCONTINUED | OUTPATIENT
Start: 2020-08-15 | End: 2020-08-15

## 2020-08-15 RX ORDER — IBUPROFEN 200 MG
24 TABLET ORAL
Status: DISCONTINUED | OUTPATIENT
Start: 2020-08-15 | End: 2020-08-17

## 2020-08-15 RX ORDER — CLOPIDOGREL BISULFATE 75 MG/1
75 TABLET ORAL DAILY
Status: DISCONTINUED | OUTPATIENT
Start: 2020-08-16 | End: 2020-09-01 | Stop reason: HOSPADM

## 2020-08-15 RX ORDER — PHENYTOIN SODIUM 100 MG/1
100 CAPSULE, EXTENDED RELEASE ORAL 3 TIMES DAILY
Status: DISCONTINUED | OUTPATIENT
Start: 2020-08-15 | End: 2020-09-01 | Stop reason: HOSPADM

## 2020-08-15 RX ORDER — INSULIN ASPART 100 [IU]/ML
1-10 INJECTION, SOLUTION INTRAVENOUS; SUBCUTANEOUS
Status: DISCONTINUED | OUTPATIENT
Start: 2020-08-15 | End: 2020-08-17

## 2020-08-15 RX ORDER — SODIUM CHLORIDE 9 MG/ML
1000 INJECTION, SOLUTION INTRAVENOUS
Status: COMPLETED | OUTPATIENT
Start: 2020-08-15 | End: 2020-08-15

## 2020-08-15 RX ORDER — IBUPROFEN 200 MG
16 TABLET ORAL
Status: DISCONTINUED | OUTPATIENT
Start: 2020-08-15 | End: 2020-08-17

## 2020-08-15 RX ORDER — GLUCAGON 1 MG
1 KIT INJECTION
Status: DISCONTINUED | OUTPATIENT
Start: 2020-08-15 | End: 2020-08-17

## 2020-08-15 RX ORDER — ACETAMINOPHEN 325 MG/1
650 TABLET ORAL EVERY 6 HOURS PRN
Status: DISCONTINUED | OUTPATIENT
Start: 2020-08-15 | End: 2020-08-25

## 2020-08-15 RX ORDER — ATORVASTATIN CALCIUM 40 MG/1
80 TABLET, FILM COATED ORAL DAILY
Status: DISCONTINUED | OUTPATIENT
Start: 2020-08-16 | End: 2020-09-01 | Stop reason: HOSPADM

## 2020-08-15 RX ADMIN — PHENYTOIN SODIUM 100 MG: 100 CAPSULE ORAL at 05:08

## 2020-08-15 RX ADMIN — SODIUM CHLORIDE 1000 ML: 0.9 INJECTION, SOLUTION INTRAVENOUS at 04:08

## 2020-08-15 RX ADMIN — PHENYTOIN SODIUM 100 MG: 100 CAPSULE ORAL at 09:08

## 2020-08-15 RX ADMIN — CALCIUM GLUCONATE 1 G: 94 INJECTION, SOLUTION INTRAVENOUS at 04:08

## 2020-08-15 RX ADMIN — HEPARIN SODIUM 5000 UNITS: 5000 INJECTION INTRAVENOUS; SUBCUTANEOUS at 09:08

## 2020-08-15 RX ADMIN — Medication 6 MG: at 09:08

## 2020-08-15 NOTE — SUBJECTIVE & OBJECTIVE
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 Tay    CATARACT EXTRACTION W/  INTRAOCULAR LENS IMPLANT Left 10/19/2017    Dr. Tay    FEMORAL ARTERY STENT      KNEE SURGERY      bilateral scope       Review of patient's allergies indicates:   Allergen Reactions    Ace inhibitors      Hyperkalemia 8/2018    Penicillins Hives    Tizanidine      Felt hot       No current facility-administered medications on file prior to encounter.      Current Outpatient Medications on File Prior to Encounter   Medication Sig    amLODIPine (NORVASC) 10 MG tablet Take 1 tablet (10 mg total) by mouth once daily.    atorvastatin (LIPITOR) 80 MG tablet Take 1 tablet (80 mg total) by mouth once daily.    baclofen (LIORESAL) 10 MG tablet Take 1 tablet (10 mg total) by mouth 4 (four) times daily.    cholecalciferol, vitamin D3, (VITAMIN D3) 1,000 unit capsule Take 1 capsule (1,000 Units total) by mouth once daily.    clopidogreL (PLAVIX) 75 mg tablet Take 1 tablet (75 mg total) by mouth once daily.    furosemide (LASIX) 20 MG tablet Take 1 tablet (20 mg total) by mouth once daily.    insulin detemir U-100 (LEVEMIR FLEXTOUCH U-100 INSULN) 100 unit/mL (3 mL) InPn pen Inject 16 Units into the skin every evening. STOP NOVOLOG    tamsulosin (FLOMAX) 0.4 mg Cap Take 2 capsules (0.8 mg total) by mouth once daily.    blood-glucose meter Misc 1 each by Misc.(Non-Drug; Combo Route) route once daily. Pharmacy-whichever brand specified by insurance    furosemide (LASIX) 20 MG tablet Take two tablets twice a day    lancets (ONETOUCH DELICA LANCETS) 33 gauge Misc 1 lancet by Misc.(Non-Drug; Combo Route) route once daily. ONCE DAILY BLOOD SUGAR TESTING; WHICHEVER BRAND  "COVERED BY INSURANCE    pen needle, diabetic 31 gauge x 1/4" Ndle For mealtime insulin (Patient not taking: Reported on 8/13/2020)    phenytoin (DILANTIN) 100 MG ER capsule Take 1 capsule (100 mg total) by mouth 3 (three) times daily.     Family History     Problem Relation (Age of Onset)    Cataracts Mother    Diabetes Mother    Heart disease Mother    Hypertension Mother, Sister    No Known Problems Father, Brother, Daughter, Maternal Aunt, Maternal Uncle, Paternal Aunt, Paternal Uncle, Maternal Grandmother, Maternal Grandfather, Paternal Grandmother, Paternal Grandfather        Tobacco Use    Smoking status: Never Smoker    Smokeless tobacco: Never Used   Substance and Sexual Activity    Alcohol use: No    Drug use: No    Sexual activity: Not on file     Review of Systems   Constitutional: Positive for unexpected weight change. Negative for chills and fever.   Eyes: Negative for photophobia and visual disturbance.   Respiratory: Negative for cough and shortness of breath.    Cardiovascular: Positive for leg swelling. Negative for chest pain and palpitations.   Gastrointestinal: Negative for abdominal pain, diarrhea, nausea and vomiting.   Genitourinary: Negative for frequency, hematuria and urgency.   Skin: Negative for pallor, rash and wound.   Neurological: Negative for light-headedness and headaches.   Psychiatric/Behavioral: Negative for confusion and decreased concentration.     Objective:     Vital Signs (Most Recent):  Temp: 98.8 °F (37.1 °C) (08/15/20 1152)  Pulse: 87 (08/15/20 1433)  Resp: 13 (08/15/20 1345)  BP: (!) 168/84 (08/15/20 1433)  SpO2: 99 % (08/15/20 1433) Vital Signs (24h Range):  Temp:  [98.8 °F (37.1 °C)] 98.8 °F (37.1 °C)  Pulse:  [] 87  Resp:  [13-18] 13  SpO2:  [98 %-99 %] 99 %  BP: (168-175)/(81-86) 168/84     Weight: 97.1 kg (214 lb)  Body mass index is 32.54 kg/m².    Physical Exam  Vitals signs and nursing note reviewed.   Constitutional:       General: He is not in " acute distress.     Appearance: He is well-developed.   HENT:      Head: Normocephalic and atraumatic.      Right Ear: External ear normal.      Left Ear: External ear normal.      Nose: Nose normal.   Eyes:      Conjunctiva/sclera: Conjunctivae normal.      Pupils: Pupils are equal, round, and reactive to light.   Neck:      Musculoskeletal: Normal range of motion and neck supple.   Cardiovascular:      Rate and Rhythm: Normal rate and regular rhythm.   Pulmonary:      Effort: Pulmonary effort is normal. No respiratory distress.      Breath sounds: Normal breath sounds. No wheezing or rales.   Abdominal:      General: Bowel sounds are normal. There is no distension.      Palpations: Abdomen is soft.      Tenderness: There is no abdominal tenderness.      Comments: No palpable hepatomegaly or splenomegaly   Musculoskeletal: Normal range of motion.         General: No tenderness.      Right lower leg: Edema present.   Skin:     General: Skin is warm and dry.   Neurological:      Mental Status: He is alert and oriented to person, place, and time.   Psychiatric:         Thought Content: Thought content normal.           CRANIAL NERVES     CN III, IV, VI   Pupils are equal, round, and reactive to light.       Significant Labs: All pertinent labs within the past 24 hours have been reviewed.    Significant Imaging: I have reviewed all pertinent imaging results/findings within the past 24 hours.

## 2020-08-15 NOTE — ASSESSMENT & PLAN NOTE
Last HgbA1c   Lab Results   Component Value Date    HGBA1C 5.5 08/13/2020     Hold oral antihyperglycemics while inpatient  PRN sliding scale insulin  ACHS glucose monitoring   ADA diet

## 2020-08-15 NOTE — HPI
65 y.o. 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  States he was called on 8/13/2020, but did not come to the ED until today.  He reports mild edema and weight gain of 1-2 lb.  Denies fever, chills, cough, SOB, chest pain, palpitations, orthopnea, PND, dizziness, syncope, n/v/d, abd pain, or dysuria.  In the ED, Cr/BUN 5.3/64, K+ 5.4, H/H 7.5/22.7, CPK 1262, , UA shows proteinura 3+, renal US suggest chronic medical renal disease.  His Nephrologist is Dr. Roa, last visit Oct 2019.  Nephrology consult.  Placed in observation.

## 2020-08-15 NOTE — H&P
[facility] Medicine  History & Physical    Patient Name: Miguel Moore  MRN: [id number]  Admission Date: 8/15/2020  Attending Physician: Pramod Alatorre, *   Primary Care Provider: Raciel Raymond MD         Patient information was obtained from patient, past medical records and ER records.     Subjective:     Principal Problem:CKD (chronic kidney disease) stage 4, GFR 15-29 ml/min    Chief Complaint:   Chief Complaint   Patient presents with    Abnormal Lab     Pt states he was told by his doctor to go ER becuase blood work showed kidney problems        HPI: 65 y.o. 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  States he was called on 8/13/2020, but did not come to the ED until today.  He reports mild edema and weight gain of 1-2 lb.  Denies fever, chills, cough, SOB, chest pain, palpitations, orthopnea, PND, dizziness, syncope, n/v/d, abd pain, or dysuria.  In the ED, Cr/BUN 5.3/64, K+ 5.4, H/H 7.5/22.7, CPK 1262, , UA shows proteinura 3+, renal US suggest chronic medical renal disease.  His Nephrologist is Dr. Roa, last visit Oct 2019.  Nephrology consult.  Placed in observation.    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 Tay    CATARACT EXTRACTION W/  INTRAOCULAR LENS IMPLANT Left 10/19/2017    Dr. Tay    FEMORAL ARTERY STENT      KNEE SURGERY      bilateral scope       Review of patient's allergies indicates:   Allergen Reactions    Ace inhibitors      Hyperkalemia 8/2018    Penicillins Hives  "   Tizanidine      Felt hot       No current facility-administered medications on file prior to encounter.      Current Outpatient Medications on File Prior to Encounter   Medication Sig    amLODIPine (NORVASC) 10 MG tablet Take 1 tablet (10 mg total) by mouth once daily.    atorvastatin (LIPITOR) 80 MG tablet Take 1 tablet (80 mg total) by mouth once daily.    baclofen (LIORESAL) 10 MG tablet Take 1 tablet (10 mg total) by mouth 4 (four) times daily.    cholecalciferol, vitamin D3, (VITAMIN D3) 1,000 unit capsule Take 1 capsule (1,000 Units total) by mouth once daily.    clopidogreL (PLAVIX) 75 mg tablet Take 1 tablet (75 mg total) by mouth once daily.    furosemide (LASIX) 20 MG tablet Take 1 tablet (20 mg total) by mouth once daily.    insulin detemir U-100 (LEVEMIR FLEXTOUCH U-100 INSULN) 100 unit/mL (3 mL) InPn pen Inject 16 Units into the skin every evening. STOP NOVOLOG    tamsulosin (FLOMAX) 0.4 mg Cap Take 2 capsules (0.8 mg total) by mouth once daily.    blood-glucose meter Misc 1 each by Misc.(Non-Drug; Combo Route) route once daily. Pharmacy-whichever brand specified by insurance    furosemide (LASIX) 20 MG tablet Take two tablets twice a day    lancets (ONETOUCH DELICA LANCETS) 33 gauge Misc 1 lancet by Misc.(Non-Drug; Combo Route) route once daily. ONCE DAILY BLOOD SUGAR TESTING; WHICHEVER BRAND COVERED BY INSURANCE    pen needle, diabetic 31 gauge x 1/4" Ndle For mealtime insulin (Patient not taking: Reported on 8/13/2020)    phenytoin (DILANTIN) 100 MG ER capsule Take 1 capsule (100 mg total) by mouth 3 (three) times daily.     Family History     Problem Relation (Age of Onset)    Cataracts Mother    Diabetes Mother    Heart disease Mother    Hypertension Mother, Sister    No Known Problems Father, Brother, Daughter, Maternal Aunt, Maternal Uncle, Paternal Aunt, Paternal Uncle, Maternal Grandmother, Maternal Grandfather, Paternal Grandmother, Paternal Grandfather        Tobacco Use    " Smoking status: Never Smoker    Smokeless tobacco: Never Used   Substance and Sexual Activity    Alcohol use: No    Drug use: No    Sexual activity: Not on file     Review of Systems   Constitutional: Positive for unexpected weight change. Negative for chills and fever.   Eyes: Negative for photophobia and visual disturbance.   Respiratory: Negative for cough and shortness of breath.    Cardiovascular: Positive for leg swelling. Negative for chest pain and palpitations.   Gastrointestinal: Negative for abdominal pain, diarrhea, nausea and vomiting.   Genitourinary: Negative for frequency, hematuria and urgency.   Skin: Negative for pallor, rash and wound.   Neurological: Negative for light-headedness and headaches.   Psychiatric/Behavioral: Negative for confusion and decreased concentration.     Objective:     Vital Signs (Most Recent):  Temp: 98.8 °F (37.1 °C) (08/15/20 1152)  Pulse: 87 (08/15/20 1433)  Resp: 13 (08/15/20 1345)  BP: (!) 168/84 (08/15/20 1433)  SpO2: 99 % (08/15/20 1433) Vital Signs (24h Range):  Temp:  [98.8 °F (37.1 °C)] 98.8 °F (37.1 °C)  Pulse:  [] 87  Resp:  [13-18] 13  SpO2:  [98 %-99 %] 99 %  BP: (168-175)/(81-86) 168/84     Weight: 97.1 kg (214 lb)  Body mass index is 32.54 kg/m².    Physical Exam  Vitals signs and nursing note reviewed.   Constitutional:       General: He is not in acute distress.     Appearance: He is well-developed.   HENT:      Head: Normocephalic and atraumatic.      Right Ear: External ear normal.      Left Ear: External ear normal.      Nose: Nose normal.   Eyes:      Conjunctiva/sclera: Conjunctivae normal.      Pupils: Pupils are equal, round, and reactive to light.   Neck:      Musculoskeletal: Normal range of motion and neck supple.   Cardiovascular:      Rate and Rhythm: Normal rate and regular rhythm.   Pulmonary:      Effort: Pulmonary effort is normal. No respiratory distress.      Breath sounds: Normal breath sounds. No wheezing or rales.    Abdominal:      General: Bowel sounds are normal. There is no distension.      Palpations: Abdomen is soft.      Tenderness: There is no abdominal tenderness.      Comments: No palpable hepatomegaly or splenomegaly   Musculoskeletal: Normal range of motion.         General: No tenderness.      Right lower leg: Edema present.   Skin:     General: Skin is warm and dry.   Neurological:      Mental Status: He is alert and oriented to person, place, and time.   Psychiatric:         Thought Content: Thought content normal.           CRANIAL NERVES     CN III, IV, VI   Pupils are equal, round, and reactive to light.       Significant Labs: All pertinent labs within the past 24 hours have been reviewed.    Significant Imaging: I have reviewed all pertinent imaging results/findings within the past 24 hours.    Assessment/Plan:     * CKD (chronic kidney disease) stage 4, GFR 15-29 ml/min with proteinuria  Appears to be worsening, Nephrology consult, appreciate recs    Non-traumatic rhabdomyolysis  CPK mildly elevated, denies myalgias or other symptoms, IV fluids in ED, repeat level    Hemiplegia and hemiparesis following cerebral infarction affecting right dominant side  Stable, no acute issue, continue plavix/statin    Seizure disorder as sequela of cerebrovascular accident  Continue home regimen of phenytoin, check level    Benign non-nodular prostatic hyperplasia without lower urinary tract symptoms  Continue home regimen of tamsulosin     Controlled type 2 diabetes mellitus with stage 4 chronic kidney disease, with long-term current use of insulin  Last HgbA1c   Lab Results   Component Value Date    HGBA1C 5.5 08/13/2020     Hold oral antihyperglycemics while inpatient  PRN sliding scale insulin  ACHS glucose monitoring   ADA diet     Pure hypercholesterolemia  Continue statin    Benign essential HTN; ACEI hyperK  Well controlled, continue home medications and monitor blood pressure, adjust as needed.       VTE Risk  Mitigation (From admission, onward)         Ordered     heparin (porcine) injection 5,000 Units  Every 8 hours      08/15/20 1505     IP VTE HIGH RISK PATIENT  Once      08/15/20 1505     Place sequential compression device  Until discontinued      08/15/20 1505              Ajay Baxter Jr., APRN, [facility]  Hospitalist - Department of Hospital Medicine  [facility] [address] [place] [id number]  Office #: [phone]; Pager #: [phone]

## 2020-08-15 NOTE — ED PROVIDER NOTES
Encounter Date: 8/15/2020       History     Chief Complaint   Patient presents with    Abnormal Lab     Pt states he was told by his doctor to go ER becuase blood work showed kidney problems     65 y.o. male Past Medical History:  No date: Diabetes mellitus, type 2  No date: Hypertension  6/9/2017: Nuclear sclerosis of both eyes  No date: Stroke  No date: Urinary tract infection     Pt with ckd  Cr 3.5 on 1/17/2020, send in to ED by PMD after routine labwork demonstrated worsening CKD with Cr 5.4, pt notes that he has gained approx 2 pounds over the last week and his R arm is swollen.      RLE US 8/13/2020 demonstrated no dvt.  Pt is followed by Dr. Roa        Review of patient's allergies indicates:   Allergen Reactions    Ace inhibitors      Hyperkalemia 8/2018    Penicillins Hives    Tizanidine      Felt hot     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    Stroke     Urinary tract infection      Past Surgical History:   Procedure Laterality Date    CATARACT EXTRACTION W/  INTRAOCULAR LENS IMPLANT Right 10/05/2017    Dr. Tay    CATARACT EXTRACTION W/  INTRAOCULAR LENS IMPLANT Left 10/19/2017    Dr. Tay    FEMORAL ARTERY STENT      KNEE SURGERY      bilateral scope     Family History   Problem Relation Age of Onset    Diabetes Mother     Heart disease Mother     Hypertension Mother     Cataracts Mother     No Known Problems Father     Hypertension Sister     No Known Problems Brother     No Known Problems Daughter     No Known Problems Maternal Aunt     No Known Problems Maternal Uncle     No Known Problems Paternal Aunt     No Known Problems Paternal Uncle     No Known Problems Maternal Grandmother     No Known Problems Maternal Grandfather     No Known Problems Paternal Grandmother     No Known Problems Paternal Grandfather     Amblyopia Neg Hx     Blindness Neg Hx     Cancer Neg  Hx     Glaucoma Neg Hx     Macular degeneration Neg Hx     Retinal detachment Neg Hx     Strabismus Neg Hx     Stroke Neg Hx     Thyroid disease Neg Hx      Social History     Tobacco Use    Smoking status: Never Smoker    Smokeless tobacco: Never Used   Substance Use Topics    Alcohol use: No    Drug use: No     Review of Systems   Constitutional: Negative for fever.   HENT: Negative for sore throat.    Respiratory: Negative for shortness of breath.    Cardiovascular: Negative for chest pain.   Gastrointestinal: Negative for nausea.   Genitourinary: Negative for dysuria.   Musculoskeletal: Negative for back pain.   Skin: Negative for rash.   Neurological: Negative for weakness.   Hematological: Does not bruise/bleed easily.   All other systems reviewed and are negative.      Physical Exam     Initial Vitals [08/15/20 1152]   BP Pulse Resp Temp SpO2   -- -- 18 98.8 °F (37.1 °C) --      MAP       --         Physical Exam    Nursing note and vitals reviewed.  Constitutional: He appears well-developed and well-nourished.   HENT:   Head: Normocephalic and atraumatic.   Eyes: EOM are normal. Pupils are equal, round, and reactive to light.   Cardiovascular: Normal rate and regular rhythm.   Pulmonary/Chest: Effort normal.   Abdominal: He exhibits no distension.   Musculoskeletal: No tenderness or edema.   Neurological: He is alert and oriented to person, place, and time.   Skin: Skin is warm and dry.   Psychiatric: He has a normal mood and affect.       RUE  Held in contracture, swollen    Symmetric b/l LE swelling    ED Course   Procedures  Labs Reviewed   CBC W/ AUTO DIFFERENTIAL - Abnormal; Notable for the following components:       Result Value    RBC 2.94 (*)     Hemoglobin 7.5 (*)     Hematocrit 22.7 (*)     Mean Corpuscular Volume 77 (*)     Mean Corpuscular Hemoglobin 25.5 (*)     RDW 14.9 (*)     Lymph # 0.5 (*)     Gran% 79.0 (*)     Lymph% 10.0 (*)     All other components within normal limits    COMPREHENSIVE METABOLIC PANEL - Abnormal; Notable for the following components:    Potassium 5.4 (*)     Chloride 111 (*)     CO2 17 (*)     Glucose 151 (*)     BUN, Bld 64 (*)     Creatinine 5.3 (*)     Calcium 5.7 (*)     Total Protein 5.4 (*)     Albumin 2.4 (*)     eGFR if  12 (*)     eGFR if non  10 (*)     All other components within normal limits    Narrative:     CALCIUM    critical result(s) called and verbal readback obtained   from DARRION GORDON  by TN3 08/15/2020 12:53   MAGNESIUM - Abnormal; Notable for the following components:    Magnesium 1.3 (*)     All other components within normal limits   PHOSPHORUS - Abnormal; Notable for the following components:    Phosphorus 5.6 (*)     All other components within normal limits   CK   URINALYSIS, REFLEX TO URINE CULTURE   B-TYPE NATRIURETIC PEPTIDE   PROTEIN / CREATININE RATIO, URINE   PROTEIN, QUANTITATIVE, URINE RANDOM   CREATININE, URINE, RANDOM   SODIUM, URINE, RANDOM   CHLORIDE, URINE, RANDOM   PHOSPHORUS, URINE, RANDOM   SARS-COV-2 RNA AMPLIFICATION, QUAL   CK   ISTAT CHEM8     EKG Readings: (Independently Interpreted)   Hr 90 sinus, nl axis/intervals, no jack/twi, non acute, no stemi.       Imaging Results    None                       Attending Attestation:         Attending Critical Care:   Critical Care Times:   Direct Patient Care (initial evaluation, reassessments, and time considering the case)................................................................30 minutes.   Additional History from reviewing old medical records or taking additional history from the family, EMS, PCP, etc.......................10 minutes.   Ordering, Reviewing, and Interpreting Diagnostic Studies...............................................................................................................10 minutes.    Documentation..................................................................................................................................................................................10 minutes.   Consultation with other Physicians. .................................................................................................................................................10 minutes.   ==============================================================  · Total Critical Care Time - exclusive of procedural time: 70 minutes.  ==============================================================            I have spoken with Dr. Hoffman from medicine who feels that nothing acute is happening since pt asymptomatic and change was found on routine labs. Patient will be made obs status to provide additional calcium, trend ck, and consult nephrology.          I have spoken with Dr. Nils Menard from nephrology who will consult on the patient. Also cautions to only give 1g calcium as the pts phos is elevated.      Clinical Impression:       ICD-10-CM ICD-9-CM   1. Non-traumatic rhabdomyolysis  M62.82 728.88   2. ANA (acute kidney injury)  N17.9 584.9   3. Hypocalcemia  E83.51 275.41                                Pramod Alatorre MD  08/15/20 1433       Pramod Alatorre MD  08/15/20 1443       Pramod Alatorre MD  08/24/20 0708

## 2020-08-16 PROBLEM — N04.9 NEPHROTIC SYNDROME: Status: ACTIVE | Noted: 2020-08-16

## 2020-08-16 PROBLEM — N40.0 ENLARGED PROSTATE: Status: ACTIVE | Noted: 2020-08-16

## 2020-08-16 PROBLEM — R60.1 ANASARCA: Status: ACTIVE | Noted: 2020-08-16

## 2020-08-16 PROBLEM — D64.9 ANEMIA: Status: ACTIVE | Noted: 2020-08-16

## 2020-08-16 PROBLEM — E83.51 HYPOCALCEMIA: Status: ACTIVE | Noted: 2020-08-16

## 2020-08-16 LAB
ABO + RH BLD: NORMAL
ALBUMIN SERPL BCP-MCNC: 2.4 G/DL (ref 3.5–5.2)
ALP SERPL-CCNC: 99 U/L (ref 55–135)
ALT SERPL W/O P-5'-P-CCNC: 11 U/L (ref 10–44)
ANION GAP SERPL CALC-SCNC: 11 MMOL/L (ref 8–16)
AST SERPL-CCNC: 12 U/L (ref 10–40)
BILIRUB SERPL-MCNC: 0.2 MG/DL (ref 0.1–1)
BLD GP AB SCN CELLS X3 SERPL QL: NORMAL
BUN SERPL-MCNC: 63 MG/DL (ref 8–23)
CALCIUM SERPL-MCNC: 6 MG/DL (ref 8.7–10.5)
CHLORIDE SERPL-SCNC: 111 MMOL/L (ref 95–110)
CK MB SERPL-MCNC: 6.5 NG/ML (ref 0.1–6.5)
CK MB SERPL-RTO: 0.6 % (ref 0–5)
CK SERPL-CCNC: 1092 U/L (ref 20–200)
CK SERPL-CCNC: 1168 U/L (ref 20–200)
CO2 SERPL-SCNC: 16 MMOL/L (ref 23–29)
CREAT SERPL-MCNC: 5.4 MG/DL (ref 0.5–1.4)
EST. GFR  (AFRICAN AMERICAN): 12 ML/MIN/1.73 M^2
EST. GFR  (NON AFRICAN AMERICAN): 10 ML/MIN/1.73 M^2
GLUCOSE SERPL-MCNC: 77 MG/DL (ref 70–110)
MAGNESIUM SERPL-MCNC: 1.4 MG/DL (ref 1.6–2.6)
PHOSPHATE UR-MCNC: 29 MG/DL
POCT GLUCOSE: 142 MG/DL (ref 70–110)
POCT GLUCOSE: 172 MG/DL (ref 70–110)
POCT GLUCOSE: 89 MG/DL (ref 70–110)
POCT GLUCOSE: 90 MG/DL (ref 70–110)
POTASSIUM SERPL-SCNC: 5.8 MMOL/L (ref 3.5–5.1)
PROT SERPL-MCNC: 5.5 G/DL (ref 6–8.4)
SODIUM SERPL-SCNC: 138 MMOL/L (ref 136–145)

## 2020-08-16 PROCEDURE — 63600175 PHARM REV CODE 636 W HCPCS: Performed by: HOSPITALIST

## 2020-08-16 PROCEDURE — 82550 ASSAY OF CK (CPK): CPT

## 2020-08-16 PROCEDURE — 80053 COMPREHEN METABOLIC PANEL: CPT

## 2020-08-16 PROCEDURE — 86850 RBC ANTIBODY SCREEN: CPT

## 2020-08-16 PROCEDURE — 83735 ASSAY OF MAGNESIUM: CPT

## 2020-08-16 PROCEDURE — 25000003 PHARM REV CODE 250: Performed by: HOSPITALIST

## 2020-08-16 PROCEDURE — 21400001 HC TELEMETRY ROOM

## 2020-08-16 PROCEDURE — 96375 TX/PRO/DX INJ NEW DRUG ADDON: CPT

## 2020-08-16 PROCEDURE — 36415 COLL VENOUS BLD VENIPUNCTURE: CPT

## 2020-08-16 PROCEDURE — 63600175 PHARM REV CODE 636 W HCPCS: Performed by: NURSE PRACTITIONER

## 2020-08-16 PROCEDURE — 96372 THER/PROPH/DIAG INJ SC/IM: CPT

## 2020-08-16 PROCEDURE — 82550 ASSAY OF CK (CPK): CPT | Mod: 91

## 2020-08-16 PROCEDURE — 25000003 PHARM REV CODE 250: Performed by: INTERNAL MEDICINE

## 2020-08-16 PROCEDURE — 82553 CREATINE MB FRACTION: CPT

## 2020-08-16 PROCEDURE — 83970 ASSAY OF PARATHORMONE: CPT

## 2020-08-16 PROCEDURE — 25000003 PHARM REV CODE 250: Performed by: NURSE PRACTITIONER

## 2020-08-16 RX ORDER — METOPROLOL TARTRATE 1 MG/ML
5 INJECTION, SOLUTION INTRAVENOUS EVERY 5 MIN PRN
Status: DISCONTINUED | OUTPATIENT
Start: 2020-08-16 | End: 2020-08-24

## 2020-08-16 RX ORDER — CALCIUM ACETATE 667 MG/1
667 CAPSULE ORAL
Status: DISCONTINUED | OUTPATIENT
Start: 2020-08-16 | End: 2020-08-30

## 2020-08-16 RX ORDER — LABETALOL 100 MG/1
200 TABLET, FILM COATED ORAL EVERY 12 HOURS
Status: DISCONTINUED | OUTPATIENT
Start: 2020-08-16 | End: 2020-08-21

## 2020-08-16 RX ORDER — FUROSEMIDE 40 MG/1
80 TABLET ORAL DAILY
Status: DISCONTINUED | OUTPATIENT
Start: 2020-08-16 | End: 2020-08-16

## 2020-08-16 RX ORDER — HYDRALAZINE HYDROCHLORIDE 20 MG/ML
10 INJECTION INTRAMUSCULAR; INTRAVENOUS EVERY 8 HOURS PRN
Status: DISCONTINUED | OUTPATIENT
Start: 2020-08-16 | End: 2020-09-01 | Stop reason: HOSPADM

## 2020-08-16 RX ORDER — LANOLIN ALCOHOL/MO/W.PET/CERES
400 CREAM (GRAM) TOPICAL 2 TIMES DAILY
Status: DISCONTINUED | OUTPATIENT
Start: 2020-08-16 | End: 2020-08-31

## 2020-08-16 RX ORDER — FUROSEMIDE 10 MG/ML
40 INJECTION INTRAMUSCULAR; INTRAVENOUS
Status: DISCONTINUED | OUTPATIENT
Start: 2020-08-16 | End: 2020-08-25

## 2020-08-16 RX ORDER — CALCITRIOL 0.25 UG/1
0.25 CAPSULE ORAL DAILY
Status: DISCONTINUED | OUTPATIENT
Start: 2020-08-16 | End: 2020-09-01 | Stop reason: HOSPADM

## 2020-08-16 RX ADMIN — FUROSEMIDE 80 MG: 40 TABLET ORAL at 09:08

## 2020-08-16 RX ADMIN — CALCIUM ACETATE 667 MG: 667 CAPSULE ORAL at 12:08

## 2020-08-16 RX ADMIN — PHENYTOIN SODIUM 100 MG: 100 CAPSULE ORAL at 09:08

## 2020-08-16 RX ADMIN — FUROSEMIDE 40 MG: 10 INJECTION, SOLUTION INTRAVENOUS at 05:08

## 2020-08-16 RX ADMIN — HEPARIN SODIUM 5000 UNITS: 5000 INJECTION INTRAVENOUS; SUBCUTANEOUS at 05:08

## 2020-08-16 RX ADMIN — LABETALOL HYDROCHLORIDE 200 MG: 100 TABLET, FILM COATED ORAL at 09:08

## 2020-08-16 RX ADMIN — CLOPIDOGREL 75 MG: 75 TABLET, FILM COATED ORAL at 09:08

## 2020-08-16 RX ADMIN — HYDRALAZINE HYDROCHLORIDE 10 MG: 20 INJECTION INTRAMUSCULAR; INTRAVENOUS at 05:08

## 2020-08-16 RX ADMIN — HEPARIN SODIUM 5000 UNITS: 5000 INJECTION INTRAVENOUS; SUBCUTANEOUS at 02:08

## 2020-08-16 RX ADMIN — TAMSULOSIN HYDROCHLORIDE 0.8 MG: 0.4 CAPSULE ORAL at 09:08

## 2020-08-16 RX ADMIN — CALCIUM ACETATE 667 MG: 667 CAPSULE ORAL at 05:08

## 2020-08-16 RX ADMIN — MAGNESIUM OXIDE TAB 400 MG (241.3 MG ELEMENTAL MG) 400 MG: 400 (241.3 MG) TAB at 05:08

## 2020-08-16 RX ADMIN — INSULIN ASPART 1 UNITS: 100 INJECTION, SOLUTION INTRAVENOUS; SUBCUTANEOUS at 09:08

## 2020-08-16 RX ADMIN — PHENYTOIN SODIUM 100 MG: 100 CAPSULE ORAL at 02:08

## 2020-08-16 RX ADMIN — CALCITRIOL CAPSULES 0.25 MCG 0.25 MCG: 0.25 CAPSULE ORAL at 09:08

## 2020-08-16 RX ADMIN — ATORVASTATIN CALCIUM 80 MG: 40 TABLET, FILM COATED ORAL at 09:08

## 2020-08-16 NOTE — ASSESSMENT & PLAN NOTE
08/15/16: Appears to be worsening, Nephrology consult, appreciate recs    08/16/20: Nephrology following - acute on chronic worsening significantly from baseline; challenging to diuresis 2/2 renal - diuretics and monitor closely

## 2020-08-16 NOTE — ASSESSMENT & PLAN NOTE
Patient with profound edema - anasarca up to abdomen - 2-3+ pitting after overnight diuresis + 3+ proteinuria - CKD3 worsened from creatine baseline up to creatine of around 3-->5.8, severe activity intolerance - Nephrology consulted and following - diureses, strict I&Os, daily weight  -stop amlodipine - add labetalol 200 mg per nephrology  -follow labs

## 2020-08-16 NOTE — ASSESSMENT & PLAN NOTE
Reports dark stools with low hgb - monitor labs, consult to GI  Results for TRACI CHAO (MRN [id number]) as of 8/16/2020 16:29   Ref. Range 8/13/2020 14:34 8/15/2020 12:01   Hemoglobin Latest Ref Range: 14.0 - 18.0 g/dL 8.0 (L) 7.5 (L)   Hematocrit Latest Ref Range: 40.0 - 54.0 % 24.6 (L) 22.7 (L)

## 2020-08-16 NOTE — PLAN OF CARE
08/16/20 0852   Discharge Assessment   Assessment Type Discharge Planning Assessment   Confirmed/corrected address and phone number on facesheet? Yes   Assessment information obtained from? Patient;Medical Record   Expected Length of Stay (days) 1   Communicated expected length of stay with patient/caregiver yes   Prior to hospitilization cognitive status: Alert/Oriented   Prior to hospitalization functional status: Independent   Current cognitive status: Alert/Oriented   Current Functional Status: Independent   Facility Arrived From: Home   Lives With alone  (Significant and sisters are near; assist if/when needed; provide transport)   Able to Return to Prior Arrangements yes   Is patient able to care for self after discharge? Yes   Who are your caregiver(s) and their phone number(s)? [phone]; Juana-sister: [phone]   Patient's perception of discharge disposition home or selfcare   Readmission Within the Last 30 Days no previous admission in last 30 days   Patient currently being followed by outpatient case management? No   Patient currently receives any other outside agency services? No   Equipment Currently Used at Home none   Do you have any problems affording any of your prescribed medications? No   Is the patient taking medications as prescribed? yes   Does the patient have transportation home? Yes   Transportation Anticipated family or friend will provide   Does the patient receive services at the [facility]mad[facility] Clinic? No   Discharge Plan A Home  (With significant other and family assist)   Discharge Plan B Other  (TBD)   DME Needed Upon Discharge  other (see comments)  (TBD)   Patient/Family in Agreement with Plan yes     SW Role explained to patient; two patient identifiers recognized; SW contact information placed on Communication board. Discussed patient managing health care at home; determined who would be helping patient at home with recovery: Tracey-significant other will help with recovery at  home.    PCP: Raciel Raymond MD  Prefers morning appointments    Extended Emergency Contact Information  Primary Emergency Contact: MontrellJuana   [place]  Mobile Phone: [phone]  Relation: Sister  Secondary Emergency Contact: NilsonTracye   [place]  Mobile Phone: [phone]  Relation: Significant other     [facility] - [place] - [address]  [address]  [place] [address]  Phone: [phone] Fax: [phone]     Payor: MEDICARE / Plan: MEDICARE PART A & B / Product Type: Government /

## 2020-08-16 NOTE — NURSING
Arrived on unit in bed awake and alert. No pain complaint. Edema to l;ower legs.   Paralysis to Right hand. Skin intact . Plan of care reviewed  Bed low call light in reach  Assessment completed.

## 2020-08-16 NOTE — PROGRESS NOTES
[facility] Hospital Medicine  Progress Note    Patient Name: Miguel Moore  MRN: [id number]  Patient Class: IP- Inpatient   Admission Date: 8/15/2020  Length of Stay: 0 days  Attending Physician: Angelina Duran MD  Primary Care Provider: Raciel Raymond MD        Subjective:     Principal Problem:Nephrotic syndrome        HPI:  65 y.o. 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  States he was called on 8/13/2020, but did not come to the ED until today.  He reports mild edema and weight gain of 1-2 lb.  Denies fever, chills, cough, SOB, chest pain, palpitations, orthopnea, PND, dizziness, syncope, n/v/d, abd pain, or dysuria.  In the ED, Cr/BUN 5.3/64, K+ 5.4, H/H 7.5/22.7, CPK 1262, , UA shows proteinura 3+, renal US suggest chronic medical renal disease.  His Nephrologist is Dr. Roa, last visit Oct 2019.  Nephrology consult.  Placed in observation.    Overview/Hospital Course:  65 y.o. AAM with history significant for DMII, HTN, stroke, and CKD4 presents for evaluation of worsening CKD sent in by provider. Creatine = 2.0 1 year ago now with creatine 5.4 and potassium 5.8 worsening swelling and anasarca 2-3+ edema up to abdomen and flank, activity intolerance and orthopnea X 5 pillows. He is not on hemodialysis.     PLAN: Nephrology following - diuretics, stop amlodipine, monitor lytes, renal functions and CPK closely; FR, strict I&Os, Daily wts -- Calcium replacement (Corrected 7.3) ---- Follow H/H (dark stools/GI consulted)    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    Stroke     Urinary tract infection        Past Surgical History:   Procedure Laterality Date    CATARACT EXTRACTION W/  INTRAOCULAR  "LENS IMPLANT Right 10/05/2017    Dr. Tay    CATARACT EXTRACTION W/  INTRAOCULAR LENS IMPLANT Left 10/19/2017    Dr. Tay    FEMORAL ARTERY STENT      KNEE SURGERY      bilateral scope       Review of patient's allergies indicates:   Allergen Reactions    Ace inhibitors      Hyperkalemia 8/2018    Penicillins Hives    Tizanidine      Felt hot       No current facility-administered medications on file prior to encounter.      Current Outpatient Medications on File Prior to Encounter   Medication Sig    amLODIPine (NORVASC) 10 MG tablet Take 1 tablet (10 mg total) by mouth once daily.    atorvastatin (LIPITOR) 80 MG tablet Take 1 tablet (80 mg total) by mouth once daily.    baclofen (LIORESAL) 10 MG tablet Take 1 tablet (10 mg total) by mouth 4 (four) times daily.    cholecalciferol, vitamin D3, (VITAMIN D3) 1,000 unit capsule Take 1 capsule (1,000 Units total) by mouth once daily.    clopidogreL (PLAVIX) 75 mg tablet Take 1 tablet (75 mg total) by mouth once daily.    furosemide (LASIX) 20 MG tablet Take 1 tablet (20 mg total) by mouth once daily.    insulin detemir U-100 (LEVEMIR FLEXTOUCH U-100 INSULN) 100 unit/mL (3 mL) InPn pen Inject 16 Units into the skin every evening. STOP NOVOLOG    tamsulosin (FLOMAX) 0.4 mg Cap Take 2 capsules (0.8 mg total) by mouth once daily.    blood-glucose meter Misc 1 each by Misc.(Non-Drug; Combo Route) route once daily. Pharmacy-whichever brand specified by insurance    furosemide (LASIX) 20 MG tablet Take two tablets twice a day    lancets (ONETOUCH DELICA LANCETS) 33 gauge Misc 1 lancet by Misc.(Non-Drug; Combo Route) route once daily. ONCE DAILY BLOOD SUGAR TESTING; WHICHEVER BRAND COVERED BY INSURANCE    pen needle, diabetic 31 gauge x 1/4" Ndle For mealtime insulin (Patient not taking: Reported on 8/13/2020)    phenytoin (DILANTIN) 100 MG ER capsule Take 1 capsule (100 mg total) by mouth 3 (three) times daily.     Family History     Problem " Relation (Age of Onset)    Cataracts Mother    Diabetes Mother    Heart disease Mother    Hypertension Mother, Sister    No Known Problems Father, Brother, Daughter, Maternal Aunt, Maternal Uncle, Paternal Aunt, Paternal Uncle, Maternal Grandmother, Maternal Grandfather, Paternal Grandmother, Paternal Grandfather        Tobacco Use    Smoking status: Never Smoker    Smokeless tobacco: Never Used   Substance and Sexual Activity    Alcohol use: No    Drug use: No    Sexual activity: Not on file     Review of Systems   Constitutional: Positive for unexpected weight change. Negative for appetite change, chills, diaphoresis and fever.   HENT: Negative for congestion, hearing loss, sore throat, tinnitus and trouble swallowing.    Eyes: Negative for photophobia, discharge, itching and visual disturbance.   Respiratory: Negative for apnea, cough, shortness of breath, wheezing and stridor.    Cardiovascular: Negative for chest pain, palpitations and leg swelling.   Gastrointestinal: Negative for abdominal distention, abdominal pain, blood in stool, constipation, diarrhea, nausea and vomiting.   Endocrine: Negative for polydipsia, polyphagia and polyuria.   Genitourinary: Negative for difficulty urinating, dysuria, flank pain, frequency, hematuria and urgency.   Musculoskeletal: Negative for arthralgias, joint swelling and neck stiffness.   Skin: Negative for color change, pallor, rash and wound.   Neurological: Negative for dizziness, tremors, seizures, light-headedness, numbness and headaches.   Hematological: Negative for adenopathy.   Psychiatric/Behavioral: Negative for confusion, decreased concentration, hallucinations and self-injury.     Objective:     Vital Signs (Most Recent):  Temp: 98.6 °F (37 °C) (08/16/20 1151)  Pulse: 87 (08/16/20 1221)  Resp: 18 (08/16/20 1151)  BP: (!) 179/94 (08/16/20 1221)  SpO2: 97 % (08/16/20 1151) Vital Signs (24h Range):  Temp:  [98.2 °F (36.8 °C)-99 °F (37.2 °C)] 98.6 °F (37  °C)  Pulse:  [77-96] 87  Resp:  [16-18] 18  SpO2:  [95 %-99 %] 97 %  BP: (160-195)/(78-94) 179/94     Weight: 99.5 kg (219 lb 5.7 oz)  Body mass index is 33.35 kg/m².    Physical Exam  Vitals signs and nursing note reviewed.   Constitutional:       General: He is not in acute distress.     Appearance: He is well-developed.   HENT:      Head: Normocephalic and atraumatic.      Right Ear: External ear normal.      Left Ear: External ear normal.      Nose: Nose normal.   Eyes:      Conjunctiva/sclera: Conjunctivae normal.      Pupils: Pupils are equal, round, and reactive to light.   Neck:      Musculoskeletal: Normal range of motion and neck supple.   Cardiovascular:      Rate and Rhythm: Normal rate and regular rhythm.   Pulmonary:      Effort: Pulmonary effort is normal. No respiratory distress.      Breath sounds: Rales present. No wheezing.   Abdominal:      General: Bowel sounds are normal. There is no distension.      Palpations: Abdomen is soft.      Tenderness: There is no abdominal tenderness.      Comments: No palpable hepatomegaly or splenomegaly   Musculoskeletal:         General: Swelling present. No tenderness.      Right lower leg: Edema present.      Left lower leg: Edema present.      Comments: Activity intolerance   Skin:     General: Skin is warm and dry.      Comments: Swelling and pitting edema up to abdomen and flank   Neurological:      Mental Status: He is alert and oriented to person, place, and time.      Motor: Weakness present.   Psychiatric:         Thought Content: Thought content normal.           CRANIAL NERVES     CN III, IV, VI   Pupils are equal, round, and reactive to light.       Significant Labs: All pertinent labs within the past 24 hours have been reviewed.    Significant Imaging: I have reviewed all pertinent imaging results/findings within the past 24 hours.      Assessment/Plan:      * Nephrotic syndrome  Patient with profound edema - anasarca up to abdomen - 2-3+ pitting  after overnight diuresis + 3+ proteinuria - CKD3 worsened from creatine baseline up to creatine of around 3-->5.8, severe activity intolerance - Nephrology consulted and following - diureses, strict I&Os, daily weight  -stop amlodipine - add labetalol 200 mg per nephrology  -follow labs    Hypocalcemia  Calcium = 6 corrected = 7.2 - nephrology following calcitrol 0.25 mcg and calcium acetate 667 po tid AC    Anasarca  See above      Non-traumatic rhabdomyolysis  08/15/20: CPK mildly elevated, denies myalgias or other symptoms, IV fluids in ED, repeat level    08/16/20: CPK 1262 minimal improvement 1168 - follow labs q12    Anemia  Reports dark stools with low hgb - monitor labs, consult to GI  Results for TRACI CHAO (MRN [id number]) as of 8/16/2020 16:29   Ref. Range 8/13/2020 14:34 8/15/2020 12:01   Hemoglobin Latest Ref Range: 14.0 - 18.0 g/dL 8.0 (L) 7.5 (L)   Hematocrit Latest Ref Range: 40.0 - 54.0 % 24.6 (L) 22.7 (L)         Enlarged prostate  Continue home flomax - voiding well - strict I&Os      Controlled type 2 diabetes mellitus with stage 4 chronic kidney disease, with long-term current use of insulin  Last HgbA1c   Lab Results   Component Value Date    HGBA1C 5.5 08/13/2020     Hold oral antihyperglycemics while inpatient  PRN sliding scale insulin  ACHS glucose monitoring   ADA diet     CKD (chronic kidney disease) stage 4, GFR 15-29 ml/min with proteinuria  08/15/16: Appears to be worsening, Nephrology consult, appreciate recs    08/16/20: Nephrology following - acute on chronic worsening significantly from baseline; challenging to diuresis 2/2 renal - diuretics and monitor closely    Hemiplegia and hemiparesis following cerebral infarction affecting right dominant side  Stable, no acute issue, continue plavix/statin    Seizure disorder as sequela of cerebrovascular accident  Continue home regimen of phenytoin, check level    Benign non-nodular prostatic hyperplasia without lower urinary tract  symptoms  Continue home regimen of tamsulosin     Pure hypercholesterolemia  Continue statin    Benign essential HTN; ACEI hyperK  Well controlled, continue home medications and monitor blood pressure, adjust as needed.     VTE Risk Mitigation (From admission, onward)         Ordered     heparin (porcine) injection 5,000 Units  Every 8 hours      08/15/20 1505     IP VTE HIGH RISK PATIENT  Once      08/15/20 1505     Place sequential compression device  Until discontinued      08/15/20 1505                Discharge Planning   GARY:      Code Status: Full Code   Is the patient medically ready for discharge?:     Reason for patient still in hospital (select all that apply): Patient unstable and Treatment plus diuresis  Discharge Plan A: Home(With significant other and family assist)              BERTO Vicente, FNP-C  Hospitalist - Department of Hospital Medicine  [address] [place] [address]  Office [phone]; Pager [phone]

## 2020-08-16 NOTE — ASSESSMENT & PLAN NOTE
08/15/20: CPK mildly elevated, denies myalgias or other symptoms, IV fluids in ED, repeat level    08/16/20: CPK 1262 minimal improvement 1168 - follow labs q12

## 2020-08-16 NOTE — CONSULTS
Message sent to pt via my chart with lab results and updates to plan.     Please schedule ferritin, tibc, magnesium, vit d, along with b12, A1c, and folate in 1-3 weeks    Reason for consultation:  Renal failure     HPI:  66 yo AA man with h/o HTN, DM type 2, CKD stage 4, seizure d/o, BPH, CVA w/ residual (R) hemiparesis was directed to ER per PCP for abnormal labs.  He is admitted and nephrology is consulted for evaluation of renal failure.  At this time he denies any problems with chest pain, SOB, fever, chills, N/V.    PMH:  As above    Scheduled Meds:   amLODIPine  10 mg Oral Daily    atorvastatin  80 mg Oral Daily    clopidogreL  75 mg Oral Daily    heparin (porcine)  5,000 Units Subcutaneous Q8H    phenytoin  100 mg Oral TID    tamsulosin  2 capsule Oral Daily       Review of patient's allergies indicates:   Allergen Reactions    Ace inhibitors      Hyperkalemia 8/2018    Penicillins Hives    Tizanidine      Felt hot     Family history:  Non contributory    Social history:  No tobacco, no alcohol    Vital Signs Range (Last 24H):  Temp:  [98.2 °F (36.8 °C)-99 °F (37.2 °C)]   Pulse:  []   Resp:  [13-18]   BP: (160-177)/(78-91)   SpO2:  [96 %-99 %]     I & O (Last 24H):    Intake/Output Summary (Last 24 hours) at 8/16/2020 0733  Last data filed at 8/16/2020 0600  Gross per 24 hour   Intake 280 ml   Output 775 ml   Net -495 ml           Physical Exam:  General appearance: well developed, well nourished, no distress  Lungs:  clear to auscultation bilaterally and normal respiratory effort  Heart: regular rate and rhythm  Abdomen: soft, non-tender non-distented; bowel sounds normal; no masses,  no organomegaly  Extremities: edema (+)    Laboratory:  CBC:   Recent Labs   Lab 08/15/20  1201 08/15/20  1345   WBC 5.00  --    RBC 2.94*  --    HGB 7.5*  --    HCT 22.7* 25*     --    MCV 77*  --    MCH 25.5*  --    MCHC 33.0  --      CMP:   Recent Labs   Lab 08/16/20  0536   GLU 77   CALCIUM 6.0*   ALBUMIN 2.4*   PROT 5.5*      K 5.8*   CO2 16*   *   BUN 63*   CREATININE 5.4*   ALKPHOS 99   ALT 11   AST 12   BILITOT 0.2     Recent Labs   Lab 08/15/20  0131    COLORU Straw   SPECGRAV 1.015   PHUR 6.0   PROTEINUA 3+*   BACTERIA Occasional   NITRITE Negative   LEUKOCYTESUR Negative   UROBILINOGEN Negative   HYALINECASTS 0       Impression:    ANA  on CKD stage 4 - ? Progression of underlying kidney disease  Proteinuria - DM nephropathy  HTN  DM type 2  H/o CVA  Anemia of CKD    Discussion/Recommendations:    In light of increased edema, hold Amlodipine and start oral Lasix  Add Labetalol 200 mg po bid for BP control  Check iPTH, vitamin D  Calcitriol 0.25 mcg po daily  Calcium acetate 667mg po tid AC  Watch renal function and electrolytes closely  We'll follow    Thank you for the courtesy of the consultation  Watch renal function, electrolytes    Nils Menard  8/16/2020

## 2020-08-16 NOTE — ASSESSMENT & PLAN NOTE
Calcium = 6 corrected = 7.2 - nephrology following calcitrol 0.25 mcg and calcium acetate 667 po tid AC

## 2020-08-16 NOTE — SUBJECTIVE & OBJECTIVE
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 Tay    CATARACT EXTRACTION W/  INTRAOCULAR LENS IMPLANT Left 10/19/2017    Dr. Tay    FEMORAL ARTERY STENT      KNEE SURGERY      bilateral scope       Review of patient's allergies indicates:   Allergen Reactions    Ace inhibitors      Hyperkalemia 8/2018    Penicillins Hives    Tizanidine      Felt hot       No current facility-administered medications on file prior to encounter.      Current Outpatient Medications on File Prior to Encounter   Medication Sig    amLODIPine (NORVASC) 10 MG tablet Take 1 tablet (10 mg total) by mouth once daily.    atorvastatin (LIPITOR) 80 MG tablet Take 1 tablet (80 mg total) by mouth once daily.    baclofen (LIORESAL) 10 MG tablet Take 1 tablet (10 mg total) by mouth 4 (four) times daily.    cholecalciferol, vitamin D3, (VITAMIN D3) 1,000 unit capsule Take 1 capsule (1,000 Units total) by mouth once daily.    clopidogreL (PLAVIX) 75 mg tablet Take 1 tablet (75 mg total) by mouth once daily.    furosemide (LASIX) 20 MG tablet Take 1 tablet (20 mg total) by mouth once daily.    insulin detemir U-100 (LEVEMIR FLEXTOUCH U-100 INSULN) 100 unit/mL (3 mL) InPn pen Inject 16 Units into the skin every evening. STOP NOVOLOG    tamsulosin (FLOMAX) 0.4 mg Cap Take 2 capsules (0.8 mg total) by mouth once daily.    blood-glucose meter Misc 1 each by Misc.(Non-Drug; Combo Route) route once daily. Pharmacy-whichever brand specified by insurance    furosemide (LASIX) 20 MG tablet Take two tablets twice a day    lancets (ONETOUCH DELICA LANCETS) 33 gauge Misc 1 lancet by Misc.(Non-Drug; Combo Route) route once daily. ONCE DAILY BLOOD SUGAR TESTING; WHICHEVER BRAND  "COVERED BY INSURANCE    pen needle, diabetic 31 gauge x 1/4" Ndle For mealtime insulin (Patient not taking: Reported on 8/13/2020)    phenytoin (DILANTIN) 100 MG ER capsule Take 1 capsule (100 mg total) by mouth 3 (three) times daily.     Family History     Problem Relation (Age of Onset)    Cataracts Mother    Diabetes Mother    Heart disease Mother    Hypertension Mother, Sister    No Known Problems Father, Brother, Daughter, Maternal Aunt, Maternal Uncle, Paternal Aunt, Paternal Uncle, Maternal Grandmother, Maternal Grandfather, Paternal Grandmother, Paternal Grandfather        Tobacco Use    Smoking status: Never Smoker    Smokeless tobacco: Never Used   Substance and Sexual Activity    Alcohol use: No    Drug use: No    Sexual activity: Not on file     Review of Systems   Constitutional: Positive for unexpected weight change. Negative for appetite change, chills, diaphoresis and fever.   HENT: Negative for congestion, hearing loss, sore throat, tinnitus and trouble swallowing.    Eyes: Negative for photophobia, discharge, itching and visual disturbance.   Respiratory: Negative for apnea, cough, shortness of breath, wheezing and stridor.    Cardiovascular: Negative for chest pain, palpitations and leg swelling.   Gastrointestinal: Negative for abdominal distention, abdominal pain, blood in stool, constipation, diarrhea, nausea and vomiting.   Endocrine: Negative for polydipsia, polyphagia and polyuria.   Genitourinary: Negative for difficulty urinating, dysuria, flank pain, frequency, hematuria and urgency.   Musculoskeletal: Negative for arthralgias, joint swelling and neck stiffness.   Skin: Negative for color change, pallor, rash and wound.   Neurological: Negative for dizziness, tremors, seizures, light-headedness, numbness and headaches.   Hematological: Negative for adenopathy.   Psychiatric/Behavioral: Negative for confusion, decreased concentration, hallucinations and self-injury.     Objective: "     Vital Signs (Most Recent):  Temp: 98.6 °F (37 °C) (08/16/20 1151)  Pulse: 87 (08/16/20 1221)  Resp: 18 (08/16/20 1151)  BP: (!) 179/94 (08/16/20 1221)  SpO2: 97 % (08/16/20 1151) Vital Signs (24h Range):  Temp:  [98.2 °F (36.8 °C)-99 °F (37.2 °C)] 98.6 °F (37 °C)  Pulse:  [77-96] 87  Resp:  [16-18] 18  SpO2:  [95 %-99 %] 97 %  BP: (160-195)/(78-94) 179/94     Weight: 99.5 kg (219 lb 5.7 oz)  Body mass index is 33.35 kg/m².    Physical Exam  Vitals signs and nursing note reviewed.   Constitutional:       General: He is not in acute distress.     Appearance: He is well-developed.   HENT:      Head: Normocephalic and atraumatic.      Right Ear: External ear normal.      Left Ear: External ear normal.      Nose: Nose normal.   Eyes:      Conjunctiva/sclera: Conjunctivae normal.      Pupils: Pupils are equal, round, and reactive to light.   Neck:      Musculoskeletal: Normal range of motion and neck supple.   Cardiovascular:      Rate and Rhythm: Normal rate and regular rhythm.   Pulmonary:      Effort: Pulmonary effort is normal. No respiratory distress.      Breath sounds: Rales present. No wheezing.   Abdominal:      General: Bowel sounds are normal. There is no distension.      Palpations: Abdomen is soft.      Tenderness: There is no abdominal tenderness.      Comments: No palpable hepatomegaly or splenomegaly   Musculoskeletal:         General: Swelling present. No tenderness.      Right lower leg: Edema present.      Left lower leg: Edema present.      Comments: Activity intolerance   Skin:     General: Skin is warm and dry.      Comments: Swelling and pitting edema up to abdomen and flank   Neurological:      Mental Status: He is alert and oriented to person, place, and time.      Motor: Weakness present.   Psychiatric:         Thought Content: Thought content normal.           CRANIAL NERVES     CN III, IV, VI   Pupils are equal, round, and reactive to light.       Significant Labs: All pertinent labs within  the past 24 hours have been reviewed.    Significant Imaging: I have reviewed all pertinent imaging results/findings within the past 24 hours.

## 2020-08-16 NOTE — PLAN OF CARE
POC reviewed with pt, pt verbalized understanding. Safety maintained throughout shift, bed locked and in lowest position, call light in reach, Side rails up X 2. Non skid sock on when OOB. Pt remained free of fall/trauma. Pt denies pain throughout shift. VSS, pt remained afebrile this shift.  Pt tolerating meals well,  no reports of nausea and vomiting. IV lasix given this shift. Calcium replacement given this shift. Pt had dark stool, NP made aware, awaiting sample. GI consult, called this evening.  POCT glucose checked, no coverage needed. Telemetry placed, NSR.  Will continue to monitor.

## 2020-08-17 ENCOUNTER — ANESTHESIA (OUTPATIENT)
Dept: ENDOSCOPY | Facility: HOSPITAL | Age: 65
DRG: 699 | End: 2020-08-17
Payer: MEDICARE

## 2020-08-17 ENCOUNTER — ANESTHESIA EVENT (OUTPATIENT)
Dept: ENDOSCOPY | Facility: HOSPITAL | Age: 65
DRG: 699 | End: 2020-08-17
Payer: MEDICARE

## 2020-08-17 LAB
ALBUMIN SERPL BCP-MCNC: 2.4 G/DL (ref 3.5–5.2)
ALP SERPL-CCNC: 100 U/L (ref 55–135)
ALT SERPL W/O P-5'-P-CCNC: 10 U/L (ref 10–44)
ANION GAP SERPL CALC-SCNC: 11 MMOL/L (ref 8–16)
AST SERPL-CCNC: 13 U/L (ref 10–40)
BACTERIA #/AREA URNS HPF: ABNORMAL /HPF
BASOPHILS # BLD AUTO: 0.03 K/UL (ref 0–0.2)
BASOPHILS NFR BLD: 0.7 % (ref 0–1.9)
BILIRUB SERPL-MCNC: 0.2 MG/DL (ref 0.1–1)
BILIRUB UR QL STRIP: NEGATIVE
BUN SERPL-MCNC: 64 MG/DL (ref 8–23)
CALCIUM SERPL-MCNC: 6.2 MG/DL (ref 8.7–10.5)
CHLORIDE SERPL-SCNC: 111 MMOL/L (ref 95–110)
CK MB SERPL-MCNC: 11.1 NG/ML (ref 0.1–6.5)
CK MB SERPL-MCNC: 7.3 NG/ML (ref 0.1–6.5)
CK MB SERPL-RTO: 0.6 % (ref 0–5)
CK MB SERPL-RTO: 0.9 % (ref 0–5)
CK SERPL-CCNC: 1262 U/L (ref 20–200)
CK SERPL-CCNC: 1293 U/L (ref 20–200)
CLARITY UR: ABNORMAL
CO2 SERPL-SCNC: 17 MMOL/L (ref 23–29)
COLOR UR: ABNORMAL
CREAT SERPL-MCNC: 5.9 MG/DL (ref 0.5–1.4)
DIFFERENTIAL METHOD: ABNORMAL
EOSINOPHIL # BLD AUTO: 0.1 K/UL (ref 0–0.5)
EOSINOPHIL NFR BLD: 2.9 % (ref 0–8)
ERYTHROCYTE [DISTWIDTH] IN BLOOD BY AUTOMATED COUNT: 14.5 % (ref 11.5–14.5)
EST. GFR  (AFRICAN AMERICAN): 11 ML/MIN/1.73 M^2
EST. GFR  (NON AFRICAN AMERICAN): 9 ML/MIN/1.73 M^2
FERRITIN SERPL-MCNC: 285 NG/ML (ref 20–300)
GLUCOSE SERPL-MCNC: 85 MG/DL (ref 70–110)
GLUCOSE UR QL STRIP: NEGATIVE
HCT VFR BLD AUTO: 25.9 % (ref 40–54)
HGB BLD-MCNC: 8.4 G/DL (ref 14–18)
HGB UR QL STRIP: ABNORMAL
HYALINE CASTS #/AREA URNS LPF: 0 /LPF
IMM GRANULOCYTES # BLD AUTO: 0 K/UL (ref 0–0.04)
IMM GRANULOCYTES NFR BLD AUTO: 0 % (ref 0–0.5)
IRON SERPL-MCNC: 63 UG/DL (ref 45–160)
KETONES UR QL STRIP: NEGATIVE
LEUKOCYTE ESTERASE UR QL STRIP: ABNORMAL
LYMPHOCYTES # BLD AUTO: 0.8 K/UL (ref 1–4.8)
LYMPHOCYTES NFR BLD: 18.2 % (ref 18–48)
MCH RBC QN AUTO: 24.6 PG (ref 27–31)
MCHC RBC AUTO-ENTMCNC: 32.4 G/DL (ref 32–36)
MCV RBC AUTO: 76 FL (ref 82–98)
MICROSCOPIC COMMENT: ABNORMAL
MONOCYTES # BLD AUTO: 0.5 K/UL (ref 0.3–1)
MONOCYTES NFR BLD: 10.8 % (ref 4–15)
NEUTROPHILS # BLD AUTO: 3.1 K/UL (ref 1.8–7.7)
NEUTROPHILS NFR BLD: 67.4 % (ref 38–73)
NITRITE UR QL STRIP: NEGATIVE
NRBC BLD-RTO: 0 /100 WBC
OB PNL STL: NEGATIVE
PH UR STRIP: 7 [PH] (ref 5–8)
PLATELET # BLD AUTO: 185 K/UL (ref 150–350)
PMV BLD AUTO: 9.5 FL (ref 9.2–12.9)
POCT GLUCOSE: 115 MG/DL (ref 70–110)
POCT GLUCOSE: 153 MG/DL (ref 70–110)
POCT GLUCOSE: 82 MG/DL (ref 70–110)
POTASSIUM SERPL-SCNC: 5.5 MMOL/L (ref 3.5–5.1)
PROT SERPL-MCNC: 5.8 G/DL (ref 6–8.4)
PROT UR QL STRIP: ABNORMAL
PTH-INTACT SERPL-MCNC: 438.7 PG/ML (ref 9–77)
RBC # BLD AUTO: 3.41 M/UL (ref 4.6–6.2)
RBC #/AREA URNS HPF: 3 /HPF (ref 0–4)
SATURATED IRON: 30 % (ref 20–50)
SODIUM SERPL-SCNC: 139 MMOL/L (ref 136–145)
SP GR UR STRIP: 1.01 (ref 1–1.03)
TOTAL IRON BINDING CAPACITY: 209 UG/DL (ref 250–450)
TRANSFERRIN SERPL-MCNC: 141 MG/DL (ref 200–375)
URN SPEC COLLECT METH UR: ABNORMAL
UROBILINOGEN UR STRIP-ACNC: NEGATIVE EU/DL
WBC # BLD AUTO: 4.55 K/UL (ref 3.9–12.7)
WBC #/AREA URNS HPF: 2 /HPF (ref 0–5)

## 2020-08-17 PROCEDURE — D9220A PRA ANESTHESIA: Mod: CRNA,,, | Performed by: NURSE ANESTHETIST, CERTIFIED REGISTERED

## 2020-08-17 PROCEDURE — 25000003 PHARM REV CODE 250: Performed by: HOSPITALIST

## 2020-08-17 PROCEDURE — 37000008 HC ANESTHESIA 1ST 15 MINUTES: Performed by: INTERNAL MEDICINE

## 2020-08-17 PROCEDURE — D9220A PRA ANESTHESIA: ICD-10-PCS | Mod: CRNA,,, | Performed by: NURSE ANESTHETIST, CERTIFIED REGISTERED

## 2020-08-17 PROCEDURE — 25000003 PHARM REV CODE 250: Performed by: INTERNAL MEDICINE

## 2020-08-17 PROCEDURE — 83540 ASSAY OF IRON: CPT

## 2020-08-17 PROCEDURE — D9220A PRA ANESTHESIA: ICD-10-PCS | Mod: ANES,,, | Performed by: ANESTHESIOLOGY

## 2020-08-17 PROCEDURE — 43239 EGD BIOPSY SINGLE/MULTIPLE: CPT | Performed by: INTERNAL MEDICINE

## 2020-08-17 PROCEDURE — 25000003 PHARM REV CODE 250: Performed by: NURSE ANESTHETIST, CERTIFIED REGISTERED

## 2020-08-17 PROCEDURE — 88305 TISSUE EXAM BY PATHOLOGIST: ICD-10-PCS | Mod: 26,,, | Performed by: PATHOLOGY

## 2020-08-17 PROCEDURE — 37000009 HC ANESTHESIA EA ADD 15 MINS: Performed by: INTERNAL MEDICINE

## 2020-08-17 PROCEDURE — 88305 TISSUE EXAM BY PATHOLOGIST: CPT | Performed by: PATHOLOGY

## 2020-08-17 PROCEDURE — 88305 TISSUE EXAM BY PATHOLOGIST: CPT | Mod: 26,,, | Performed by: PATHOLOGY

## 2020-08-17 PROCEDURE — 86334 IMMUNOFIX E-PHORESIS SERUM: CPT | Mod: 26,,, | Performed by: PATHOLOGY

## 2020-08-17 PROCEDURE — D9220A PRA ANESTHESIA: Mod: ANES,,, | Performed by: ANESTHESIOLOGY

## 2020-08-17 PROCEDURE — 36415 COLL VENOUS BLD VENIPUNCTURE: CPT

## 2020-08-17 PROCEDURE — 86334 IMMUNOFIX E-PHORESIS SERUM: CPT

## 2020-08-17 PROCEDURE — 25000003 PHARM REV CODE 250

## 2020-08-17 PROCEDURE — 81000 URINALYSIS NONAUTO W/SCOPE: CPT

## 2020-08-17 PROCEDURE — 82553 CREATINE MB FRACTION: CPT | Mod: 91

## 2020-08-17 PROCEDURE — 86334 PATHOLOGIST INTERPRETATION IFE: ICD-10-PCS | Mod: 26,,, | Performed by: PATHOLOGY

## 2020-08-17 PROCEDURE — 82728 ASSAY OF FERRITIN: CPT

## 2020-08-17 PROCEDURE — 27201012 HC FORCEPS, HOT/COLD, DISP: Performed by: INTERNAL MEDICINE

## 2020-08-17 PROCEDURE — 63600175 PHARM REV CODE 636 W HCPCS: Performed by: NURSE ANESTHETIST, CERTIFIED REGISTERED

## 2020-08-17 PROCEDURE — 25000003 PHARM REV CODE 250: Performed by: NURSE PRACTITIONER

## 2020-08-17 PROCEDURE — 85025 COMPLETE CBC W/AUTO DIFF WBC: CPT

## 2020-08-17 PROCEDURE — 80053 COMPREHEN METABOLIC PANEL: CPT

## 2020-08-17 PROCEDURE — 82550 ASSAY OF CK (CPK): CPT | Mod: 91

## 2020-08-17 PROCEDURE — 82272 OCCULT BLD FECES 1-3 TESTS: CPT

## 2020-08-17 PROCEDURE — 63600175 PHARM REV CODE 636 W HCPCS: Performed by: NURSE PRACTITIONER

## 2020-08-17 PROCEDURE — 21400001 HC TELEMETRY ROOM

## 2020-08-17 PROCEDURE — 63600175 PHARM REV CODE 636 W HCPCS: Performed by: HOSPITALIST

## 2020-08-17 RX ORDER — LIDOCAINE HYDROCHLORIDE 20 MG/ML
INJECTION, SOLUTION INFILTRATION; PERINEURAL
Status: DISPENSED
Start: 2020-08-17 | End: 2020-08-18

## 2020-08-17 RX ORDER — GLYCOPYRROLATE 0.2 MG/ML
INJECTION INTRAMUSCULAR; INTRAVENOUS
Status: DISCONTINUED | OUTPATIENT
Start: 2020-08-17 | End: 2020-08-17

## 2020-08-17 RX ORDER — SODIUM CHLORIDE 9 MG/ML
INJECTION, SOLUTION INTRAVENOUS CONTINUOUS PRN
Status: DISCONTINUED | OUTPATIENT
Start: 2020-08-17 | End: 2020-08-17

## 2020-08-17 RX ORDER — PROPOFOL 10 MG/ML
VIAL (ML) INTRAVENOUS
Status: DISCONTINUED | OUTPATIENT
Start: 2020-08-17 | End: 2020-08-17

## 2020-08-17 RX ORDER — SODIUM CHLORIDE 0.9 % (FLUSH) 0.9 %
10 SYRINGE (ML) INJECTION
Status: DISCONTINUED | OUTPATIENT
Start: 2020-08-17 | End: 2020-09-01 | Stop reason: HOSPADM

## 2020-08-17 RX ORDER — HYDRALAZINE HYDROCHLORIDE 25 MG/1
25 TABLET, FILM COATED ORAL EVERY 8 HOURS
Status: DISCONTINUED | OUTPATIENT
Start: 2020-08-17 | End: 2020-08-18

## 2020-08-17 RX ORDER — LABETALOL HYDROCHLORIDE 5 MG/ML
INJECTION, SOLUTION INTRAVENOUS
Status: COMPLETED
Start: 2020-08-17 | End: 2020-08-17

## 2020-08-17 RX ORDER — PROPOFOL 10 MG/ML
INJECTION, EMULSION INTRAVENOUS
Status: DISPENSED
Start: 2020-08-17 | End: 2020-08-18

## 2020-08-17 RX ORDER — LABETALOL HYDROCHLORIDE 5 MG/ML
10 INJECTION, SOLUTION INTRAVENOUS ONCE
Status: COMPLETED | OUTPATIENT
Start: 2020-08-17 | End: 2020-08-17

## 2020-08-17 RX ORDER — LIDOCAINE HYDROCHLORIDE 20 MG/ML
INJECTION INTRAVENOUS
Status: DISCONTINUED | OUTPATIENT
Start: 2020-08-17 | End: 2020-08-17

## 2020-08-17 RX ADMIN — Medication 50 MG: at 03:08

## 2020-08-17 RX ADMIN — METOPROLOL TARTRATE 5 MG: 5 INJECTION, SOLUTION INTRAVENOUS at 12:08

## 2020-08-17 RX ADMIN — MAGNESIUM OXIDE TAB 400 MG (241.3 MG ELEMENTAL MG) 400 MG: 400 (241.3 MG) TAB at 04:08

## 2020-08-17 RX ADMIN — HYDRALAZINE HYDROCHLORIDE 25 MG: 25 TABLET, FILM COATED ORAL at 05:08

## 2020-08-17 RX ADMIN — LABETALOL HYDROCHLORIDE 10 MG: 5 INJECTION, SOLUTION INTRAVENOUS at 02:08

## 2020-08-17 RX ADMIN — Medication 100 MG: at 03:08

## 2020-08-17 RX ADMIN — HYDRALAZINE HYDROCHLORIDE 10 MG: 20 INJECTION INTRAMUSCULAR; INTRAVENOUS at 08:08

## 2020-08-17 RX ADMIN — TAMSULOSIN HYDROCHLORIDE 0.8 MG: 0.4 CAPSULE ORAL at 04:08

## 2020-08-17 RX ADMIN — FUROSEMIDE 40 MG: 10 INJECTION, SOLUTION INTRAVENOUS at 04:08

## 2020-08-17 RX ADMIN — LABETALOL HYDROCHLORIDE 200 MG: 100 TABLET, FILM COATED ORAL at 08:08

## 2020-08-17 RX ADMIN — ACETAMINOPHEN 650 MG: 325 TABLET ORAL at 04:08

## 2020-08-17 RX ADMIN — GLYCOPYRROLATE 0.1 MG: 0.2 INJECTION, SOLUTION INTRAMUSCULAR; INTRAVENOUS at 03:08

## 2020-08-17 RX ADMIN — LABETALOL HYDROCHLORIDE 200 MG: 100 TABLET, FILM COATED ORAL at 09:08

## 2020-08-17 RX ADMIN — CALCIUM ACETATE 667 MG: 667 CAPSULE ORAL at 04:08

## 2020-08-17 RX ADMIN — HYDRALAZINE HYDROCHLORIDE 25 MG: 25 TABLET, FILM COATED ORAL at 09:08

## 2020-08-17 RX ADMIN — CALCITRIOL CAPSULES 0.25 MCG 0.25 MCG: 0.25 CAPSULE ORAL at 04:08

## 2020-08-17 RX ADMIN — PHENYTOIN SODIUM 100 MG: 100 CAPSULE ORAL at 09:08

## 2020-08-17 RX ADMIN — MAGNESIUM OXIDE TAB 400 MG (241.3 MG ELEMENTAL MG) 400 MG: 400 (241.3 MG) TAB at 09:08

## 2020-08-17 RX ADMIN — PROPOFOL 100 MG: 10 INJECTION, EMULSION INTRAVENOUS at 03:08

## 2020-08-17 RX ADMIN — PHENYTOIN SODIUM 100 MG: 100 CAPSULE ORAL at 04:08

## 2020-08-17 RX ADMIN — CLOPIDOGREL 75 MG: 75 TABLET, FILM COATED ORAL at 04:08

## 2020-08-17 RX ADMIN — SODIUM CHLORIDE: 0.9 INJECTION, SOLUTION INTRAVENOUS at 03:08

## 2020-08-17 RX ADMIN — HYDRALAZINE HYDROCHLORIDE 10 MG: 20 INJECTION INTRAMUSCULAR; INTRAVENOUS at 12:08

## 2020-08-17 RX ADMIN — ATORVASTATIN CALCIUM 80 MG: 40 TABLET, FILM COATED ORAL at 04:08

## 2020-08-17 NOTE — PROGRESS NOTES
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 Tay    CATARACT EXTRACTION W/  INTRAOCULAR LENS IMPLANT Left 10/19/2017    Dr. Tay    FEMORAL ARTERY STENT      KNEE SURGERY      bilateral scope     Review of patient's allergies indicates:   Allergen Reactions    Ace inhibitors      Hyperkalemia 8/2018    Penicillins Hives    Tizanidine      Felt hot       Current Facility-Administered Medications   Medication    acetaminophen tablet 650 mg    atorvastatin tablet 80 mg    calcitRIOL capsule 0.25 mcg    calcium acetate(phosphat bind) capsule 667 mg    clopidogreL tablet 75 mg    furosemide injection 40 mg    hydrALAZINE injection 10 mg    hydrALAZINE tablet 25 mg    labetaloL tablet 200 mg    magnesium oxide tablet 400 mg    melatonin tablet 6 mg    metoprolol injection 5 mg    phenytoin (DILANTIN) ER capsule 100 mg    sodium chloride 0.9% flush 10 mL    sodium chloride 0.9% flush 10 mL    tamsulosin 24 hr capsule 0.8 mg       LABS    Recent Results (from the past 24 hour(s))   POCT glucose    Collection Time: 08/16/20  4:31 PM   Result Value Ref Range    POCT Glucose 142 (H) 70 - 110 mg/dL   Type & Screen    Collection Time: 08/16/20  5:25 PM   Result Value Ref Range    Group & Rh A POS     Indirect Henrry NEG    POCT glucose    Collection Time: 08/16/20  7:13 PM   Result Value Ref Range    POCT Glucose 172 (H) 70 - 110 mg/dL   CK-MB    Collection Time: 08/16/20  8:02 PM   Result Value Ref Range    CPK 1092 (H) 20 - 200 U/L    CPK MB 6.5 0.1 - 6.5 ng/mL    MB% 0.6 0.0 - 5.0 %   Occult blood x 1, stool    Collection Time: 08/17/20  4:35 AM   Result Value Ref  Range    Occult Blood Negative Negative   CBC auto differential    Collection Time: 08/17/20  4:48 AM   Result Value Ref Range    WBC 4.55 3.90 - 12.70 K/uL    RBC 3.41 (L) 4.60 - 6.20 M/uL    Hemoglobin 8.4 (L) 14.0 - 18.0 g/dL    Hematocrit 25.9 (L) 40.0 - 54.0 %    Mean Corpuscular Volume 76 (L) 82 - 98 fL    Mean Corpuscular Hemoglobin 24.6 (L) 27.0 - 31.0 pg    Mean Corpuscular Hemoglobin Conc 32.4 32.0 - 36.0 g/dL    RDW 14.5 11.5 - 14.5 %    Platelets 185 150 - 350 K/uL    MPV 9.5 9.2 - 12.9 fL    Immature Granulocytes 0.0 0.0 - 0.5 %    Gran # (ANC) 3.1 1.8 - 7.7 K/uL    Immature Grans (Abs) 0.00 0.00 - 0.04 K/uL    Lymph # 0.8 (L) 1.0 - 4.8 K/uL    Mono # 0.5 0.3 - 1.0 K/uL    Eos # 0.1 0.0 - 0.5 K/uL    Baso # 0.03 0.00 - 0.20 K/uL    nRBC 0 0 /100 WBC    Gran% 67.4 38.0 - 73.0 %    Lymph% 18.2 18.0 - 48.0 %    Mono% 10.8 4.0 - 15.0 %    Eosinophil% 2.9 0.0 - 8.0 %    Basophil% 0.7 0.0 - 1.9 %    Differential Method Automated    Comprehensive metabolic panel    Collection Time: 08/17/20  4:48 AM   Result Value Ref Range    Sodium 139 136 - 145 mmol/L    Potassium 5.5 (H) 3.5 - 5.1 mmol/L    Chloride 111 (H) 95 - 110 mmol/L    CO2 17 (L) 23 - 29 mmol/L    Glucose 85 70 - 110 mg/dL    BUN, Bld 64 (H) 8 - 23 mg/dL    Creatinine 5.9 (H) 0.5 - 1.4 mg/dL    Calcium 6.2 (LL) 8.7 - 10.5 mg/dL    Total Protein 5.8 (L) 6.0 - 8.4 g/dL    Albumin 2.4 (L) 3.5 - 5.2 g/dL    Total Bilirubin 0.2 0.1 - 1.0 mg/dL    Alkaline Phosphatase 100 55 - 135 U/L    AST 13 10 - 40 U/L    ALT 10 10 - 44 U/L    Anion Gap 11 8 - 16 mmol/L    eGFR if African American 11 (A) >60 mL/min/1.73 m^2    eGFR if non African American 9 (A) >60 mL/min/1.73 m^2   POCT glucose    Collection Time: 08/17/20  8:00 AM   Result Value Ref Range    POCT Glucose 82 70 - 110 mg/dL   CK-MB    Collection Time: 08/17/20  8:16 AM   Result Value Ref Range    CPK 1262 (H) 20 - 200 U/L    CPK MB 7.3 (H) 0.1 - 6.5 ng/mL    MB% 0.6 0.0 - 5.0 %   Urinalysis, Reflex to  Urine Culture Urine, Clean Catch    Collection Time: 08/17/20 10:12 AM    Specimen: Urine   Result Value Ref Range    Specimen UA Urine, Clean Catch     Color, UA Straw Yellow, Straw, Magdalena    Appearance, UA Hazy (A) Clear    pH, UA 7.0 5.0 - 8.0    Specific Gravity, UA 1.010 1.005 - 1.030    Protein, UA 2+ (A) Negative    Glucose, UA Negative Negative    Ketones, UA Negative Negative    Bilirubin (UA) Negative Negative    Occult Blood UA 1+ (A) Negative    Nitrite, UA Negative Negative    Urobilinogen, UA Negative <2.0 EU/dL    Leukocytes, UA Trace (A) Negative   Urinalysis Microscopic    Collection Time: 08/17/20 10:12 AM   Result Value Ref Range    RBC, UA 3 0 - 4 /hpf    WBC, UA 2 0 - 5 /hpf    Bacteria Few (A) None-Occ /hpf    Hyaline Casts, UA 0 0-1/lpf /lpf    Microscopic Comment SEE COMMENT    POCT glucose    Collection Time: 08/17/20 11:19 AM   Result Value Ref Range    POCT Glucose 115 (H) 70 - 110 mg/dL   Iron and TIBC    Collection Time: 08/17/20 11:23 AM   Result Value Ref Range    Iron 63 45 - 160 ug/dL    Transferrin 141 (L) 200 - 375 mg/dL    TIBC 209 (L) 250 - 450 ug/dL    Saturated Iron 30 20 - 50 %   Ferritin    Collection Time: 08/17/20 11:23 AM   Result Value Ref Range    Ferritin 285 20.0 - 300.0 ng/mL   ]    I/O last 3 completed shifts:  In: 580 [P.O.:580]  Out: 3750 [Urine:3750]    Vitals:    08/17/20 0824 08/17/20 0900 08/17/20 1121 08/17/20 1220   BP: (!) 200/94 (!) 160/100 (!) 201/96 (!) 174/90   Pulse:   91    Resp:   17    Temp:   98.2 °F (36.8 °C)    TempSrc:   Oral    SpO2:   99%    Weight:       Height:           No Jvd, Thyromegaly or Lymphadenopathy  Lungs: Fairly clear anteriorly and laterally  Cor: RRR no G or rubs  Abd: Soft benign good bowel sounds non tender  Ext: Pos edema, no calf tenderness    A)  Nephrotic snd  Anemia  BPH co inability to void  CKD4  Hyperpth on binders and vitd analogs  DM  HTN  Obesity  Hyperpth    P)  Renal Diet  Home meds  Protect L arm for future  access  EPO might be needed, check SPEP  Binders  Adjust all meds to the degree of renal fx  Close follow up I/O and weights  Maintain Hydration

## 2020-08-17 NOTE — NURSING
Patient to scheduled procedure as ordered.  Patient awake alert and oriented. No apparent distress noted. Pt went per stretcher.

## 2020-08-17 NOTE — PLAN OF CARE
MD at bedside with patient. Results and follow-up discussed, patient verbalized understanding.  No acute distress voiced or observed.

## 2020-08-17 NOTE — SUBJECTIVE & OBJECTIVE
Interval History: No new complaints     Review of Systems   Constitutional: Negative for activity change.   HENT: Negative for congestion.    Respiratory: Negative for chest tightness and shortness of breath.    Cardiovascular: Negative for chest pain.   Gastrointestinal: Negative for abdominal pain.   Genitourinary: Negative for difficulty urinating.   Musculoskeletal: Negative for arthralgias.   Psychiatric/Behavioral: Negative for agitation.     Objective:     Vital Signs (Most Recent):  Temp: 98.2 °F (36.8 °C) (08/17/20 0435)  Pulse: 87 (08/17/20 0525)  Resp: 20 (08/17/20 0525)  BP: (!) 160/80 (08/17/20 0525)  SpO2: 98 % (08/17/20 0525) Vital Signs (24h Range):  Temp:  [98.2 °F (36.8 °C)-98.6 °F (37 °C)] 98.2 °F (36.8 °C)  Pulse:  [79-96] 87  Resp:  [16-20] 20  SpO2:  [95 %-98 %] 98 %  BP: (160-195)/(80-94) 160/80     Weight: 98.2 kg (216 lb 7.9 oz)  Body mass index is 32.92 kg/m².    Intake/Output Summary (Last 24 hours) at 8/17/2020 0655  Last data filed at 8/17/2020 0600  Gross per 24 hour   Intake 300 ml   Output 2775 ml   Net -2475 ml      Physical Exam  Vitals signs and nursing note reviewed.   Constitutional:       General: He is not in acute distress.     Appearance: Normal appearance. He is obese. He is not ill-appearing, toxic-appearing or diaphoretic.   HENT:      Head: Normocephalic and atraumatic.   Pulmonary:      Effort: No respiratory distress.   Neurological:      Mental Status: He is alert and oriented to person, place, and time.   Psychiatric:         Mood and Affect: Mood normal.         Behavior: Behavior normal.         Significant Labs:   BMP:   Recent Labs   Lab 08/16/20  0939 08/17/20  0448   GLU  --  85   NA  --  139   K  --  5.5*   CL  --  111*   CO2  --  17*   BUN  --  64*   CREATININE  --  5.9*   CALCIUM  --  6.2*   MG 1.4*  --      CBC:   Recent Labs   Lab 08/15/20  1201 08/15/20  1345 08/17/20  0448   WBC 5.00  --  4.55   HGB 7.5*  --  8.4*   HCT 22.7* 25* 25.9*     --  185        Significant Imaging:

## 2020-08-17 NOTE — [employer] PATIENT INSTRUCTIONS
Discharge Summary/Instructions after an Endoscopic Procedure  Patient Name: Miguel Moore  Patient MRN: [id number]  Patient YOB: 1955  Monday, August 17, 2020  Desmond Chapman MD  RESTRICTIONS:  During your procedure today, you received medications for sedation.  These   medications may affect your judgment, balance and coordination.  Therefore,   for 24 hours, you have the following restrictions:   - DO NOT drive a car, operate machinery, make legal/financial decisions,   sign important papers or drink alcohol.    ACTIVITY:  Today: no heavy lifting, straining or running due to procedural   sedation/anesthesia.  The following day: return to full activity including work.  DIET:  Eat and drink normally unless instructed otherwise.     TREATMENT FOR COMMON SIDE EFFECTS:  - Mild abdominal pain, nausea, belching, bloating or excessive gas:  rest,   eat lightly and use a heating pad.  - Sore Throat: treat with throat lozenges and/or gargle with warm salt   water.  - Because air was used during the procedure, expelling large amounts of air   from your rectum or belching is normal.  - If a bowel prep was taken, you may not have a bowel movement for 1-3 days.    This is normal.  SYMPTOMS TO WATCH FOR AND REPORT TO YOUR PHYSICIAN:  1. Abdominal pain or bloating, other than gas cramps.  2. Chest pain.  3. Back pain.  4. Signs of infection such as: chills or fever occurring within 24 hours   after the procedure.  5. Rectal bleeding, which would show as bright red, maroon, or black stools.   (A tablespoon of blood from the rectum is not serious, especially if   hemorrhoids are present.)  6. Vomiting.  7. Weakness or dizziness.  GO DIRECTLY TO THE NEAREST EMERGENCY ROOM IF YOU HAVE ANY OF THE FOLLOWING:      Difficulty breathing              Chills and/or fever over 101 F   Persistent vomiting and/or vomiting blood   Severe abdominal pain   Severe chest pain   Black, tarry stools   Bleeding- more than one  tablespoon   Any other symptom or condition that you feel may need urgent attention  Your doctor recommends these additional instructions:  If any biopsies were taken, your doctors clinic will contact you in 1 to 2   weeks with any results.  - Return patient to [facility] for ongoing care.   - Clear liquid diet.   - Continue present medications.   - Await pathology results.   - Perform a colonoscopy, in near future, based on clinical picture.  For questions, problems or results please call your physician - Desmond Chapman MD at Work:  [phone].  [facility] can be reached at [phone]     IF A COMPLICATION OR EMERGENCY SITUATION ARISES AND YOU ARE UNABLE TO REACH   YOUR PHYSICIAN - GO DIRECTLY TO THE EMERGENCY ROOM.  Desmond Chapman MD  8/17/2020 3:39:02 PM  This report has been verified and signed electronically.  [employer]

## 2020-08-17 NOTE — CONSULTS
.[facility]  Gastroenterology  Consult Note    Patient Name: Miguel Moore  MRN: [id number]  Admission Date: 8/15/2020  Hospital Length of Stay: 1 days  Code Status: Full Code   Primary Care Physician: Raciel Raymond MD  Principal Problem:Nephrotic syndrome    Inpatient consult to Gastroenterology  Consult performed by: Abby Bryant PA-C  Consult ordered by: RAFAEL Vicente        Subjective:     Chief complaint: Black stools.    HPI: The patient is a 65 year old male with a history of HTN, DM, CKD, CVA and seizures presenting with acute kidney injury and a possible GI bleed.  He reports a one week history of black, loose stools.  No associated hematemesis or hematochezia.  Possibly precipitated / aggravated by plavix.  No alleviating factors.  He denies abdominal pain or weight loss.  No heavy NSAID use.  Denies taking iron pills or pepto-bismol recently.  No prior history of similar symptoms.  He denies any prior endoscopic procedures.    Past medical history:  Hypertension.  Diabetes mellitus, type 2.  Chronic kidney disease, stage 4.  Cerebrovascular accident.  Seizures.    Past surgical history:  Cataract extraction.  Femoral artery stent.  Knee arthroscopy.    Social history:  Tobacco use: denies.  Alcohol use: denies.  Illicit drug use: denies.    Family history:  No family history of liver or colon cancer.    Medications:  Medications Prior to Admission   Medication Sig Dispense Refill Last Dose    amLODIPine (NORVASC) 10 MG tablet Take 1 tablet (10 mg total) by mouth once daily. 90 tablet 3 8/15/2020    atorvastatin (LIPITOR) 80 MG tablet Take 1 tablet (80 mg total) by mouth once daily. 90 tablet 3 8/15/2020    baclofen (LIORESAL) 10 MG tablet Take 1 tablet (10 mg total) by mouth 4 (four) times daily. 120 tablet 11 8/15/2020    cholecalciferol, vitamin D3, (VITAMIN D3) 1,000 unit capsule Take 1 capsule (1,000 Units total) by mouth once daily. 90 capsule 3 8/15/2020     "clopidogreL (PLAVIX) 75 mg tablet Take 1 tablet (75 mg total) by mouth once daily. 90 tablet 3 8/15/2020    furosemide (LASIX) 20 MG tablet Take 1 tablet (20 mg total) by mouth once daily. 90 tablet 3 8/15/2020    insulin detemir U-100 (LEVEMIR FLEXTOUCH U-100 INSULN) 100 unit/mL (3 mL) InPn pen Inject 16 Units into the skin every evening. STOP NOVOLOG 1 Box 11 8/15/2020    tamsulosin (FLOMAX) 0.4 mg Cap Take 2 capsules (0.8 mg total) by mouth once daily. 180 capsule 3 8/15/2020    blood-glucose meter Misc 1 each by Misc.(Non-Drug; Combo Route) route once daily. Pharmacy-whichever brand specified by insurance 1 each 0     furosemide (LASIX) 20 MG tablet Take two tablets twice a day 120 tablet 0     lancets (ONETOUCH DELICA LANCETS) 33 gauge Misc 1 lancet by Misc.(Non-Drug; Combo Route) route once daily. ONCE DAILY BLOOD SUGAR TESTING; WHICHEVER BRAND COVERED BY INSURANCE 30 each 11     pen needle, diabetic 31 gauge x 1/4" Ndle For mealtime insulin (Patient not taking: Reported on 8/13/2020) 300 each 11     phenytoin (DILANTIN) 100 MG ER capsule Take 1 capsule (100 mg total) by mouth 3 (three) times daily. 270 capsule 3        Allergies:  ACE inhibitors.  Penicillin.  Tizanidine.    Review of systems:  CONSTITUTIONAL: Negative for fever, chills, weakness, weight loss, weight gain.  HEENT: Negative for blurred vision, hearing loss, nasal congestion, dry mouth, sore throat.  CARDIOVASCULAR: Negative for chest pain or palpitations.  RESPIRATORY: Negative for SOB or cough.  GASTROINTESTINAL: See HPI  GENITOURINARY: Negative for dysuria or hematuria.  MUSCULOSKELETAL: Negative for osteoarthritis or muscle pain.  SKIN: Negative for rashes/lesions.  NEUROLOGIC: Negative for headaches, numbness/tingling.  ENDOCRINE: Negative for diabetes or thyroid abnormalities.  HEMATOLOGIC: Negative for anemia or blood dyscrasias.    Objective:     Vital Signs (Most Recent):  Temp: 98.6 °F (37 °C) (08/17/20 0802)  Pulse: 92 " (08/17/20 0802)  Resp: 17 (08/17/20 0802)  BP: (!) 160/100 (08/17/20 0900)  SpO2: 96 % (08/17/20 0802) Vital Signs (24h Range):  Temp:  [98.2 °F (36.8 °C)-98.6 °F (37 °C)] 98.6 °F (37 °C)  Pulse:  [79-95] 92  Resp:  [16-20] 17  SpO2:  [95 %-98 %] 96 %  BP: (160-203)/() 160/100     Physical examination:  General: well developed, well nourished, no apparent distress  HENT: NCAT, atraumatic, hearing grossly intact, no visible or palpable thyroid mass  Eyes: PERRL, EOMI, anicteric sclera  Cardiovascular: Regular rate and rhythm. No peripheral edema.   Lungs: Non-labored respirations. Breath sounds equal.   Abdomen: soft, NTND, normoactive BS  Extremities: No C/C, 2+ dorsalis pedis pulses bilaterally  Neuro: AA&O x 3, no asterixes or tremors  Psych: Appropriate mood and affect. No SI.  Skin: No jaundice, rashes or lesions  Musculoskeletal: 5/5 strength bilaterally    CBC:   Recent Labs   Lab 08/15/20  1201 08/15/20  1345 08/17/20  0448   WBC 5.00  --  4.55   HGB 7.5*  --  8.4*   HCT 22.7* 25* 25.9*     --  185       Assessment:   65 year old male with a history of HTN, DM, CKD, CVA and seizures presenting with acute renal failure, subjective melena and a microcytic anemia.  H/H with significant decline over the last year, though stable this admission.  Concern for chronic blood loss, aggravated by plavix.    Plan:   1.  EGD today.  2.  Empiric PPI BID.  3.  Trend H/H & transfuse if Hb <7g/dL.  4.  Check iron studies.  5.  If EGD neg, will need outpatient colonoscopy in near future.    Thank you for your consult.     Abby Bryant PA-C  Gastroenterology  [facility]

## 2020-08-17 NOTE — PROGRESS NOTES
MEWS Monitoring: Chart check completed, abnormal VS noted, bedside RN, Kenyon & Susy contacted, pt is still in Endo, BP will be treated ASAP upon return to unit, and Susy will call Endo. MD aware/ following, instructed to call [phone] for further concerns or assistance..

## 2020-08-17 NOTE — NURSING
Patient returned to unit from scheduled procedure. Patient awake, alert and oriented. Patient c/o discomfort.to lower abdomen

## 2020-08-17 NOTE — NURSING
Dr. Bravo notify with regards to Heparin SQ Q8H that has been hold last night as ordered by SHAYLA Moss but still in MAR. Awaiting reply.

## 2020-08-17 NOTE — PROGRESS NOTES
MEWS Monitoring: Chart check completed, abnormal VS noted, BP medication administered, no concerns verbalized at this time, instructed to call [phone] for further concerns or assistance..

## 2020-08-17 NOTE — NURSING
B/p 206/106, no c/o of a headache or numbness. Dr. Al notified. New orders noted Labatelol 10mg IVP now.

## 2020-08-17 NOTE — TRANSFER OF CARE
"Anesthesia Transfer of Care Note    Patient: Miguel Moore    Procedure(s) Performed: Procedure(s) (LRB):  EGD (ESOPHAGOGASTRODUODENOSCOPY) (N/A)    Patient location: PACU    Anesthesia Type: general    Transport from OR: Transported from OR on room air with adequate spontaneous ventilation    Post pain: adequate analgesia    Post assessment: no apparent anesthetic complications and tolerated procedure well    Post vital signs: stable    Level of consciousness: awake, alert and oriented    Nausea/Vomiting: no nausea/vomiting    Complications: none    Transfer of care protocol was followed      Last vitals:   Visit Vitals  BP (!) 170/76   Pulse 80   Temp 36.5 °C (97.7 °F) (Oral)   Resp (!) 6   Ht 5' 8" (1.727 m)   Wt 98.2 kg (216 lb 7.9 oz)   SpO2 100%   BMI 32.92 kg/m²     "

## 2020-08-17 NOTE — PLAN OF CARE
Problem: Fall Injury Risk  Goal: Absence of Fall and Fall-Related Injury  Outcome: Ongoing, Progressing  Intervention: Identify and Manage Contributors to Fall Injury Risk  Flowsheets (Taken 8/17/2020 0550)  Self-Care Promotion:   independence encouraged   BADL personal objects within reach   BADL personal routines maintained  Medication Review/Management:   medications reviewed   high risk medications identified  Intervention: Promote Injury-Free Environment  Flowsheets (Taken 8/17/2020 0550)  Safety Promotion/Fall Prevention:   assistive device/personal item within reach   bed alarm set   instructed to call staff for mobility   supervised activity   nonskid shoes/socks when out of bed   bed alarm refused   gait belt with ambulation   medications reviewed   high risk medications identified   lighting adjusted   Fall Risk reviewed with patient/family     Problem: Adult Inpatient Plan of Care  Goal: Plan of Care Review  Flowsheets (Taken 8/17/2020 0550)  Plan of Care Reviewed With: patient  Goal: Patient-Specific Goal (Individualization)  Outcome: Ongoing, Progressing  Goal: Optimal Comfort and Wellbeing  Outcome: Ongoing, Progressing  Goal: Readiness for Transition of Care  Outcome: Ongoing, Progressing  Goal: Rounds/Family Conference  Outcome: Ongoing, Progressing

## 2020-08-17 NOTE — PROGRESS NOTES
[facility] Hospital Medicine  Progress Note    Patient Name: Miguel Moore  MRN: [id number]  Patient Class: IP- Inpatient   Admission Date: 8/15/2020  Length of Stay: 1 days  Attending Physician: Randal Bravo MD  Primary Care Provider: Raciel Raymond MD        Subjective:     Principal Problem:Nephrotic syndrome        HPI:  65 y.o. 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  States he was called on 8/13/2020, but did not come to the ED until today.  He reports mild edema and weight gain of 1-2 lb.  Denies fever, chills, cough, SOB, chest pain, palpitations, orthopnea, PND, dizziness, syncope, n/v/d, abd pain, or dysuria.  In the ED, Cr/BUN 5.3/64, K+ 5.4, H/H 7.5/22.7, CPK 1262, , UA shows proteinura 3+, renal US suggest chronic medical renal disease.  His Nephrologist is Dr. Roa, last visit Oct 2019.  Nephrology consult.  Placed in observation.    Overview/Hospital Course:  65 y.o. AAM with history significant for DMII, HTN, stroke, and CKD4 presents for evaluation of worsening CKD sent in by provider. Creatine = 2.0 1 year ago now with creatine 5.4 and potassium 5.8 worsening swelling and anasarca 2-3+ edema up to abdomen and flank, activity intolerance and orthopnea X 5 pillows. He is not on hemodialysis.     PLAN: Nephrology following - diuretics, stop amlodipine, monitor lytes, renal functions and CPK closely; FR, strict I&Os, Daily wts -- Calcium replacement (Corrected 7.3) ---- Follow H/H (dark stools/GI consulted)    Interval History: No new complaints     Review of Systems   Constitutional: Negative for activity change.   HENT: Negative for congestion.    Respiratory: Negative for chest tightness and shortness of breath.    Cardiovascular: Negative for chest pain.    Gastrointestinal: Negative for abdominal pain.   Genitourinary: Negative for difficulty urinating.   Musculoskeletal: Negative for arthralgias.   Psychiatric/Behavioral: Negative for agitation.     Objective:     Vital Signs (Most Recent):  Temp: 98.2 °F (36.8 °C) (08/17/20 0435)  Pulse: 87 (08/17/20 0525)  Resp: 20 (08/17/20 0525)  BP: (!) 160/80 (08/17/20 0525)  SpO2: 98 % (08/17/20 0525) Vital Signs (24h Range):  Temp:  [98.2 °F (36.8 °C)-98.6 °F (37 °C)] 98.2 °F (36.8 °C)  Pulse:  [79-96] 87  Resp:  [16-20] 20  SpO2:  [95 %-98 %] 98 %  BP: (160-195)/(80-94) 160/80     Weight: 98.2 kg (216 lb 7.9 oz)  Body mass index is 32.92 kg/m².    Intake/Output Summary (Last 24 hours) at 8/17/2020 0655  Last data filed at 8/17/2020 0600  Gross per 24 hour   Intake 300 ml   Output 2775 ml   Net -2475 ml      Physical Exam  Vitals signs and nursing note reviewed.   Constitutional:       General: He is not in acute distress.     Appearance: Normal appearance. He is obese. He is not ill-appearing, toxic-appearing or diaphoretic.   HENT:      Head: Normocephalic and atraumatic.   Pulmonary:      Effort: No respiratory distress.   Neurological:      Mental Status: He is alert and oriented to person, place, and time.   Psychiatric:         Mood and Affect: Mood normal.         Behavior: Behavior normal.         Significant Labs:   BMP:   Recent Labs   Lab 08/16/20  0939 08/17/20  0448   GLU  --  85   NA  --  139   K  --  5.5*   CL  --  111*   CO2  --  17*   BUN  --  64*   CREATININE  --  5.9*   CALCIUM  --  6.2*   MG 1.4*  --      CBC:   Recent Labs   Lab 08/15/20  1201 08/15/20  1345 08/17/20  0448   WBC 5.00  --  4.55   HGB 7.5*  --  8.4*   HCT 22.7* 25* 25.9*     --  185       Significant Imaging:       Assessment/Plan:      * Nephrotic syndrome  Patient with profound edema - anasarca up to abdomen - 2-3+ pitting after overnight diuresis + 3+ proteinuria - CKD3 worsened from creatine baseline up to creatine of around  3-->5.8, severe activity intolerance - Nephrology consulted and following - diureses, strict I&Os, daily weight  -stop amlodipine - add labetalol 200 mg per nephrology  -follow labs    Anemia  Reports dark stools with low hgb - monitor labs, consult to GI  Results for TRACI CHAO (MRN [id number]) as of 8/16/2020 16:29   Ref. Range 8/13/2020 14:34 8/15/2020 12:01   Hemoglobin Latest Ref Range: 14.0 - 18.0 g/dL 8.0 (L) 7.5 (L)   Hematocrit Latest Ref Range: 40.0 - 54.0 % 24.6 (L) 22.7 (L)         Enlarged prostate  Continue home flomax - voiding well - strict I&Os      Hypocalcemia  Calcium = 6 corrected = 7.2 - nephrology following calcitrol 0.25 mcg and calcium acetate 667 po tid AC    Anasarca  See above      Non-traumatic rhabdomyolysis  08/15/20: CPK mildly elevated, denies myalgias or other symptoms, IV fluids in ED, repeat level    08/16/20: CPK 1262 minimal improvement 1168 - follow labs q12    CKD (chronic kidney disease) stage 4, GFR 15-29 ml/min with proteinuria  08/15/16: Appears to be worsening, Nephrology consult, appreciate recs    08/16/20: Nephrology following - acute on chronic worsening significantly from baseline; challenging to diuresis 2/2 renal - diuretics and monitor closely    Hemiplegia and hemiparesis following cerebral infarction affecting right dominant side  Stable, no acute issue, continue plavix/statin    Seizure disorder as sequela of cerebrovascular accident  Continue home regimen of phenytoin, check level    Benign non-nodular prostatic hyperplasia without lower urinary tract symptoms  Continue home regimen of tamsulosin     Controlled type 2 diabetes mellitus with stage 4 chronic kidney disease, with long-term current use of insulin  Last HgbA1c   Lab Results   Component Value Date    HGBA1C 5.5 08/13/2020     Hold oral antihyperglycemics while inpatient  PRN sliding scale insulin  ACHS glucose monitoring   ADA diet     Pure hypercholesterolemia  Continue statin    Benign  essential HTN; ACEI hyperK  Well controlled, continue home medications and monitor blood pressure, adjust as needed.     Obesity Body mass index is 32.92 kg/m². weight loss as out patient     VTE Risk Mitigation (From admission, onward)         Ordered     heparin (porcine) injection 5,000 Units  Every 8 hours      08/15/20 1505     IP VTE HIGH RISK PATIENT  Once      08/15/20 1505     Place sequential compression device  Until discontinued      08/15/20 1505                Discharge Planning   GARY:      Code Status: Full Code   Is the patient medically ready for discharge?:     Reason for patient still in hospital (select all that apply): Consult recommendations  Discharge Plan A: Home(With significant other and family assist)        GI eval today. Home likely on 8/18.          Randal Hoffman MD  Department of Hospital Medicine   [facility]

## 2020-08-17 NOTE — ANESTHESIA PREPROCEDURE EVALUATION
08/17/2020  Miguel Moore is a 65 y.o., male.    Anesthesia Evaluation     I have reviewed the Nursing Notes.       Review of Systems  Anesthesia Hx:  No previous Anesthesia   Social:  Non-Smoker    Hematology/Oncology:     Oncology Normal    -- Anemia: Hematology Comments: Pt takes plavix    EENT/Dental:EENT/Dental Normal   Cardiovascular:   Exercise tolerance: poor Denies Pacemaker. Hypertension  hyperlipidemia    Pulmonary:   Denies Pneumonia Denies COPD.  Denies Asthma.  Denies Shortness of breath.  Denies Recent URI.  Denies Sleep Apnea.    Renal/:   Chronic Renal Disease, ARF, CRI BPH    Hepatic/GI:  Hepatic/GI Normal    Musculoskeletal:  Musculoskeletal Normal Anasarca this admission   Neurological:   CVA, residual symptoms Neuromuscular Disease, Seizures Rt sided pareses   Endocrine:   Diabetes, type 2, using insulin    Dermatological:  Skin Normal    Psych:  Psychiatric Normal           Physical Exam  General:  Well nourished    Airway/Jaw/Neck:  Airway Findings: Mallampati: II TM Distance: < 4 cm      Dental:  DENTAL FINDINGS: Normal   Chest/Lungs:  Chest/Lungs Findings: Tachypnea     Heart/Vascular:  Heart Findings: Normal       Mental Status:  Mental Status Findings:  Cooperative, Alert and Oriented         Anesthesia Plan  Type of Anesthesia, risks & benefits discussed:  Anesthesia Type:  general  Patient's Preference:   Intra-op Monitoring Plan: standard ASA monitors  Intra-op Monitoring Plan Comments:   Post Op Pain Control Plan: multimodal analgesia, IV/PO Opioids PRN and per primary service following discharge from PACU  Post Op Pain Control Plan Comments:   Induction:    Beta Blocker:  Patient is not currently on a Beta-Blocker (No further documentation required).       Informed Consent: Patient understands risks and agrees with Anesthesia plan.  Questions answered. Anesthesia consent  signed with patient.  ASA Score: 3     Day of Surgery Review of History & Physical:    H&P update referred to the provider.  H&P completed by Anesthesiologist.   Anesthesia Plan Notes: npo        Ready For Surgery From Anesthesia Perspective.

## 2020-08-17 NOTE — NURSING
Spoke to Dr. Jiménez regarding patient c/o not being able to void. New orders noted in and out cath now x1.

## 2020-08-18 PROBLEM — E87.5 HYPERKALEMIA: Status: ACTIVE | Noted: 2020-08-18

## 2020-08-18 LAB
ALBUMIN SERPL BCP-MCNC: 2.7 G/DL (ref 3.5–5.2)
ALP SERPL-CCNC: 101 U/L (ref 55–135)
ALT SERPL W/O P-5'-P-CCNC: 14 U/L (ref 10–44)
ANION GAP SERPL CALC-SCNC: 15 MMOL/L (ref 8–16)
AST SERPL-CCNC: 17 U/L (ref 10–40)
BASOPHILS # BLD AUTO: 0.02 K/UL (ref 0–0.2)
BASOPHILS NFR BLD: 0.3 % (ref 0–1.9)
BILIRUB SERPL-MCNC: 0.3 MG/DL (ref 0.1–1)
BUN SERPL-MCNC: 75 MG/DL (ref 8–23)
CALCIUM SERPL-MCNC: 6.6 MG/DL (ref 8.7–10.5)
CHLORIDE SERPL-SCNC: 106 MMOL/L (ref 95–110)
CK MB SERPL-MCNC: 13.3 NG/ML (ref 0.1–6.5)
CK MB SERPL-RTO: 1.1 % (ref 0–5)
CK SERPL-CCNC: 1251 U/L (ref 20–200)
CO2 SERPL-SCNC: 15 MMOL/L (ref 23–29)
CREAT SERPL-MCNC: 6.4 MG/DL (ref 0.5–1.4)
DIFFERENTIAL METHOD: ABNORMAL
EOSINOPHIL # BLD AUTO: 0 K/UL (ref 0–0.5)
EOSINOPHIL NFR BLD: 0.1 % (ref 0–8)
ERYTHROCYTE [DISTWIDTH] IN BLOOD BY AUTOMATED COUNT: 14.4 % (ref 11.5–14.5)
EST. GFR  (AFRICAN AMERICAN): 10 ML/MIN/1.73 M^2
EST. GFR  (NON AFRICAN AMERICAN): 8 ML/MIN/1.73 M^2
GLUCOSE SERPL-MCNC: 147 MG/DL (ref 70–110)
HCT VFR BLD AUTO: 25.7 % (ref 40–54)
HGB BLD-MCNC: 8.6 G/DL (ref 14–18)
IMM GRANULOCYTES # BLD AUTO: 0.03 K/UL (ref 0–0.04)
IMM GRANULOCYTES NFR BLD AUTO: 0.4 % (ref 0–0.5)
LYMPHOCYTES # BLD AUTO: 0.5 K/UL (ref 1–4.8)
LYMPHOCYTES NFR BLD: 6.5 % (ref 18–48)
MCH RBC QN AUTO: 25.1 PG (ref 27–31)
MCHC RBC AUTO-ENTMCNC: 33.5 G/DL (ref 32–36)
MCV RBC AUTO: 75 FL (ref 82–98)
MONOCYTES # BLD AUTO: 0.7 K/UL (ref 0.3–1)
MONOCYTES NFR BLD: 9.6 % (ref 4–15)
NEUTROPHILS # BLD AUTO: 5.8 K/UL (ref 1.8–7.7)
NEUTROPHILS NFR BLD: 83.1 % (ref 38–73)
NRBC BLD-RTO: 0 /100 WBC
PHENYTOIN FREE SERPL-MCNC: 1.1 MCG/ML (ref 1–2)
PHENYTOIN, TOTAL: 4.1 MCG/ML (ref 10–20)
PLATELET # BLD AUTO: 208 K/UL (ref 150–350)
PMV BLD AUTO: 10.4 FL (ref 9.2–12.9)
POCT GLUCOSE: 144 MG/DL (ref 70–110)
POCT GLUCOSE: 150 MG/DL (ref 70–110)
POCT GLUCOSE: 155 MG/DL (ref 70–110)
POCT GLUCOSE: 163 MG/DL (ref 70–110)
POTASSIUM SERPL-SCNC: 6.1 MMOL/L (ref 3.5–5.1)
PROT SERPL-MCNC: 6.2 G/DL (ref 6–8.4)
RBC # BLD AUTO: 3.42 M/UL (ref 4.6–6.2)
SODIUM SERPL-SCNC: 136 MMOL/L (ref 136–145)
WBC # BLD AUTO: 6.97 K/UL (ref 3.9–12.7)

## 2020-08-18 PROCEDURE — 25000003 PHARM REV CODE 250: Performed by: INTERNAL MEDICINE

## 2020-08-18 PROCEDURE — 25000003 PHARM REV CODE 250: Performed by: HOSPITALIST

## 2020-08-18 PROCEDURE — 80053 COMPREHEN METABOLIC PANEL: CPT

## 2020-08-18 PROCEDURE — 25000003 PHARM REV CODE 250: Performed by: NURSE PRACTITIONER

## 2020-08-18 PROCEDURE — 94761 N-INVAS EAR/PLS OXIMETRY MLT: CPT

## 2020-08-18 PROCEDURE — 63600175 PHARM REV CODE 636 W HCPCS: Performed by: HOSPITALIST

## 2020-08-18 PROCEDURE — 63600175 PHARM REV CODE 636 W HCPCS: Performed by: NURSE PRACTITIONER

## 2020-08-18 PROCEDURE — 82550 ASSAY OF CK (CPK): CPT

## 2020-08-18 PROCEDURE — 21400001 HC TELEMETRY ROOM

## 2020-08-18 PROCEDURE — 82553 CREATINE MB FRACTION: CPT

## 2020-08-18 PROCEDURE — 36415 COLL VENOUS BLD VENIPUNCTURE: CPT

## 2020-08-18 PROCEDURE — 85025 COMPLETE CBC W/AUTO DIFF WBC: CPT

## 2020-08-18 RX ORDER — IBUPROFEN 200 MG
24 TABLET ORAL
Status: DISCONTINUED | OUTPATIENT
Start: 2020-08-18 | End: 2020-09-01 | Stop reason: HOSPADM

## 2020-08-18 RX ORDER — TRAZODONE HYDROCHLORIDE 50 MG/1
50 TABLET ORAL NIGHTLY PRN
Status: DISCONTINUED | OUTPATIENT
Start: 2020-08-18 | End: 2020-08-24

## 2020-08-18 RX ORDER — GLUCAGON 1 MG
1 KIT INJECTION
Status: DISCONTINUED | OUTPATIENT
Start: 2020-08-18 | End: 2020-09-01 | Stop reason: HOSPADM

## 2020-08-18 RX ORDER — IBUPROFEN 200 MG
16 TABLET ORAL
Status: DISCONTINUED | OUTPATIENT
Start: 2020-08-18 | End: 2020-09-01 | Stop reason: HOSPADM

## 2020-08-18 RX ORDER — HYDRALAZINE HYDROCHLORIDE 25 MG/1
50 TABLET, FILM COATED ORAL EVERY 8 HOURS
Status: DISCONTINUED | OUTPATIENT
Start: 2020-08-18 | End: 2020-08-20

## 2020-08-18 RX ORDER — INSULIN ASPART 100 [IU]/ML
1-10 INJECTION, SOLUTION INTRAVENOUS; SUBCUTANEOUS
Status: DISCONTINUED | OUTPATIENT
Start: 2020-08-18 | End: 2020-09-01 | Stop reason: HOSPADM

## 2020-08-18 RX ADMIN — PHENYTOIN SODIUM 100 MG: 100 CAPSULE ORAL at 08:08

## 2020-08-18 RX ADMIN — CALCIUM ACETATE 667 MG: 667 CAPSULE ORAL at 08:08

## 2020-08-18 RX ADMIN — TAMSULOSIN HYDROCHLORIDE 0.8 MG: 0.4 CAPSULE ORAL at 08:08

## 2020-08-18 RX ADMIN — CALCITRIOL CAPSULES 0.25 MCG 0.25 MCG: 0.25 CAPSULE ORAL at 08:08

## 2020-08-18 RX ADMIN — METOPROLOL TARTRATE 5 MG: 5 INJECTION, SOLUTION INTRAVENOUS at 02:08

## 2020-08-18 RX ADMIN — MAGNESIUM OXIDE TAB 400 MG (241.3 MG ELEMENTAL MG) 400 MG: 400 (241.3 MG) TAB at 08:08

## 2020-08-18 RX ADMIN — PHENYTOIN SODIUM 100 MG: 100 CAPSULE ORAL at 02:08

## 2020-08-18 RX ADMIN — ATORVASTATIN CALCIUM 80 MG: 40 TABLET, FILM COATED ORAL at 08:08

## 2020-08-18 RX ADMIN — HYDRALAZINE HYDROCHLORIDE 50 MG: 25 TABLET, FILM COATED ORAL at 09:08

## 2020-08-18 RX ADMIN — HYDRALAZINE HYDROCHLORIDE 10 MG: 20 INJECTION INTRAMUSCULAR; INTRAVENOUS at 12:08

## 2020-08-18 RX ADMIN — TRAZODONE HYDROCHLORIDE 50 MG: 50 TABLET ORAL at 09:08

## 2020-08-18 RX ADMIN — CALCIUM ACETATE 667 MG: 667 CAPSULE ORAL at 04:08

## 2020-08-18 RX ADMIN — LABETALOL HYDROCHLORIDE 200 MG: 100 TABLET, FILM COATED ORAL at 09:08

## 2020-08-18 RX ADMIN — CALCIUM ACETATE 667 MG: 667 CAPSULE ORAL at 12:08

## 2020-08-18 RX ADMIN — LABETALOL HYDROCHLORIDE 200 MG: 100 TABLET, FILM COATED ORAL at 08:08

## 2020-08-18 RX ADMIN — HYDRALAZINE HYDROCHLORIDE 50 MG: 25 TABLET, FILM COATED ORAL at 01:08

## 2020-08-18 RX ADMIN — HYDRALAZINE HYDROCHLORIDE 25 MG: 25 TABLET, FILM COATED ORAL at 06:08

## 2020-08-18 RX ADMIN — CALCIUM GLUCONATE 1000 MG: 98 INJECTION, SOLUTION INTRAVENOUS at 01:08

## 2020-08-18 RX ADMIN — MAGNESIUM OXIDE TAB 400 MG (241.3 MG ELEMENTAL MG) 400 MG: 400 (241.3 MG) TAB at 09:08

## 2020-08-18 RX ADMIN — PHENYTOIN SODIUM 100 MG: 100 CAPSULE ORAL at 09:08

## 2020-08-18 RX ADMIN — CLOPIDOGREL 75 MG: 75 TABLET, FILM COATED ORAL at 08:08

## 2020-08-18 RX ADMIN — FUROSEMIDE 40 MG: 10 INJECTION, SOLUTION INTRAVENOUS at 04:08

## 2020-08-18 RX ADMIN — FUROSEMIDE 40 MG: 10 INJECTION, SOLUTION INTRAVENOUS at 05:08

## 2020-08-18 NOTE — PROGRESS NOTES
[facility] Hospital Medicine  Progress Note    Patient Name: Miguel Moore  MRN: [id number]  Patient Class: IP- Inpatient   Admission Date: 8/15/2020  Length of Stay: 2 days  Attending Physician: Barrie Negro MD  Primary Care Provider: Raciel Raymond MD        Subjective:     Principal Problem:Nephrotic syndrome        HPI:  65 y.o. 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  States he was called on 8/13/2020, but did not come to the ED until today.  He reports mild edema and weight gain of 1-2 lb.  Denies fever, chills, cough, SOB, chest pain, palpitations, orthopnea, PND, dizziness, syncope, n/v/d, abd pain, or dysuria.  In the ED, Cr/BUN 5.3/64, K+ 5.4, H/H 7.5/22.7, CPK 1262, , UA shows proteinura 3+, renal US suggest chronic medical renal disease.  His Nephrologist is Dr. Roa, last visit Oct 2019.  Nephrology consult.  Placed in observation.    Overview/Hospital Course:  65 y.o. AAM with history significant for DMII, HTN, stroke, and CKD4 presents for evaluation of worsening CKD sent in by provider. Creatine = 2.0 1 year ago now with creatine 5.4 and potassium 5.8 worsening swelling and anasarca 2-3+ edema up to abdomen and flank, activity intolerance and orthopnea X 5 pillows. He is not on hemodialysis.  Amlodipine stopped.  Worsening BP and started on Hydralazine.  Nephrology consulted.    Interval History: No new complaints.    Review of Systems   HENT: Negative for ear discharge and ear pain.    Eyes: Negative for discharge and itching.   Endocrine: Negative for cold intolerance and heat intolerance.   Neurological: Negative for seizures and syncope.     Objective:     Vital Signs (Most Recent):  Temp: 97.5 °F (36.4 °C) (08/18/20 1205)  Pulse: 75 (08/18/20 1205)  Resp: 17  (08/18/20 1205)  BP: (!) 178/81 (08/18/20 1231)  SpO2: 97 % (08/18/20 1205) Vital Signs (24h Range):  Temp:  [97.5 °F (36.4 °C)-98.3 °F (36.8 °C)] 97.5 °F (36.4 °C)  Pulse:  [75-97] 75  Resp:  [6-20] 17  SpO2:  [96 %-100 %] 97 %  BP: (169-215)/() 178/81     Weight: 96.1 kg (211 lb 13.8 oz)  Body mass index is 32.21 kg/m².    Intake/Output Summary (Last 24 hours) at 8/18/2020 1303  Last data filed at 8/18/2020 0433  Gross per 24 hour   Intake 700 ml   Output --   Net 700 ml      Physical Exam  Vitals signs and nursing note reviewed.   Constitutional:       General: He is not in acute distress.     Appearance: He is well-developed.   HENT:      Head: Normocephalic and atraumatic.      Right Ear: External ear normal.      Left Ear: External ear normal.      Nose: Nose normal.   Eyes:      Conjunctiva/sclera: Conjunctivae normal.      Pupils: Pupils are equal, round, and reactive to light.   Neck:      Musculoskeletal: Normal range of motion and neck supple.   Cardiovascular:      Rate and Rhythm: Normal rate and regular rhythm.   Pulmonary:      Effort: Pulmonary effort is normal. No respiratory distress.      Breath sounds: Rales present. No wheezing.   Abdominal:      General: Bowel sounds are normal. There is no distension.      Palpations: Abdomen is soft.      Tenderness: There is no abdominal tenderness.      Comments: No palpable hepatomegaly or splenomegaly   Musculoskeletal:         General: Swelling present. No tenderness.      Right lower leg: Edema present.      Left lower leg: Edema present.      Comments: Activity intolerance   Skin:     General: Skin is warm and dry.      Comments: Swelling and pitting edema up to abdomen and flank   Neurological:      Mental Status: He is alert and oriented to person, place, and time.      Motor: Weakness present.   Psychiatric:         Thought Content: Thought content normal.         Significant Labs:   BMP:   Recent Labs   Lab 08/18/20  0811   *       K 6.1*      CO2 15*   BUN 75*   CREATININE 6.4*   CALCIUM 6.6*     CBC:   Recent Labs   Lab 08/17/20  0448 08/18/20  0811   WBC 4.55 6.97   HGB 8.4* 8.6*   HCT 25.9* 25.7*    208       Significant Imaging: I have reviewed all pertinent imaging results/findings within the past 24 hours.      Assessment/Plan:      * Nephrotic syndrome  Patient with profound edema - anasarca up to abdomen - 2-3+ pitting after overnight diuresis + 3+ proteinuria - CKD stage 4 worsened from creatine baseline up to creatine of around 3-->5.8, severe activity intolerance - Nephrology consulted and following - diureses, strict I&Os, daily weight  -stop amlodipine - added labetalol 200 mg per nephrology.  Also started on Hydralazine.  -follow labs    Hyperkalemia  Will treat with Calcium Gluconate      Anemia  Anemia of CKD  H/H stable since admission with no evidence of bleeding.        Enlarged prostate  Continue home flomax - voiding well - strict I&Os      Hypocalcemia  Calcium = 6 corrected = 7.2 - nephrology following calcitrol 0.25 mcg and calcium acetate 667 po tid AC    Anasarca  See above      Non-traumatic rhabdomyolysis  CPK has remained around 1200    Type 2 diabetes mellitus with diabetic neuropathy, with long-term current use of insulin        CKD (chronic kidney disease) stage 4, GFR 15-29 ml/min with proteinuria  Acute on chronic CKD stage 4  Appreciate Nephrology input.    Hemiplegia and hemiparesis following cerebral infarction affecting right dominant side  Stable, no acute issue, continue plavix/statin.  PT/OT evaluation.    Seizure disorder as sequela of cerebrovascular accident  Continue home regimen of phenytoin    Benign non-nodular prostatic hyperplasia without lower urinary tract symptoms  Continue home regimen of tamsulosin     Controlled type 2 diabetes mellitus with stage 4 chronic kidney disease, with long-term current use of insulin  Last HgbA1c   Lab Results   Component Value Date    HGBA1C 5.5  08/13/2020     Hold oral antihyperglycemics while inpatient  PRN sliding scale insulin  ACHS glucose monitoring   ADA diet     Pure hypercholesterolemia  Continue statin    Benign essential HTN; ACEI hyperK  Uncontrolled.  Norvasc stopped with lower extremity edema.  Started on Hydralazine.  Increase as needed.      VTE Risk Mitigation (From admission, onward)         Ordered     IP VTE HIGH RISK PATIENT  Once      08/15/20 1505     Place sequential compression device  Until discontinued      08/15/20 1505                Discharge Planning   GARY:      Code Status: Full Code   Is the patient medically ready for discharge?:     Reason for patient still in hospital (select all that apply): Patient unstable  Discharge Plan A: Home(With significant other and family assist)                  Barrie Negro MD  Department of Hospital Medicine   [facility]

## 2020-08-18 NOTE — SUBJECTIVE & OBJECTIVE
Interval History: No new complaints.    Review of Systems   HENT: Negative for ear discharge and ear pain.    Eyes: Negative for discharge and itching.   Endocrine: Negative for cold intolerance and heat intolerance.   Neurological: Negative for seizures and syncope.     Objective:     Vital Signs (Most Recent):  Temp: 97.5 °F (36.4 °C) (08/18/20 1205)  Pulse: 75 (08/18/20 1205)  Resp: 17 (08/18/20 1205)  BP: (!) 178/81 (08/18/20 1231)  SpO2: 97 % (08/18/20 1205) Vital Signs (24h Range):  Temp:  [97.5 °F (36.4 °C)-98.3 °F (36.8 °C)] 97.5 °F (36.4 °C)  Pulse:  [75-97] 75  Resp:  [6-20] 17  SpO2:  [96 %-100 %] 97 %  BP: (169-215)/() 178/81     Weight: 96.1 kg (211 lb 13.8 oz)  Body mass index is 32.21 kg/m².    Intake/Output Summary (Last 24 hours) at 8/18/2020 1303  Last data filed at 8/18/2020 0433  Gross per 24 hour   Intake 700 ml   Output --   Net 700 ml      Physical Exam  Vitals signs and nursing note reviewed.   Constitutional:       General: He is not in acute distress.     Appearance: He is well-developed.   HENT:      Head: Normocephalic and atraumatic.      Right Ear: External ear normal.      Left Ear: External ear normal.      Nose: Nose normal.   Eyes:      Conjunctiva/sclera: Conjunctivae normal.      Pupils: Pupils are equal, round, and reactive to light.   Neck:      Musculoskeletal: Normal range of motion and neck supple.   Cardiovascular:      Rate and Rhythm: Normal rate and regular rhythm.   Pulmonary:      Effort: Pulmonary effort is normal. No respiratory distress.      Breath sounds: Rales present. No wheezing.   Abdominal:      General: Bowel sounds are normal. There is no distension.      Palpations: Abdomen is soft.      Tenderness: There is no abdominal tenderness.      Comments: No palpable hepatomegaly or splenomegaly   Musculoskeletal:         General: Swelling present. No tenderness.      Right lower leg: Edema present.      Left lower leg: Edema present.      Comments: Activity  intolerance   Skin:     General: Skin is warm and dry.      Comments: Swelling and pitting edema up to abdomen and flank   Neurological:      Mental Status: He is alert and oriented to person, place, and time.      Motor: Weakness present.   Psychiatric:         Thought Content: Thought content normal.         Significant Labs:   BMP:   Recent Labs   Lab 08/18/20  0811   *      K 6.1*      CO2 15*   BUN 75*   CREATININE 6.4*   CALCIUM 6.6*     CBC:   Recent Labs   Lab 08/17/20  0448 08/18/20  0811   WBC 4.55 6.97   HGB 8.4* 8.6*   HCT 25.9* 25.7*    208       Significant Imaging: I have reviewed all pertinent imaging results/findings within the past 24 hours.

## 2020-08-18 NOTE — ANESTHESIA POSTPROCEDURE EVALUATION
Anesthesia Post Evaluation    Patient: Miguel Moore    Procedure(s) Performed: Procedure(s) (LRB):  EGD (ESOPHAGOGASTRODUODENOSCOPY) (N/A)    Final Anesthesia Type: general    Patient location during evaluation: GI PACU  Patient participation: Yes- Able to Participate  Level of consciousness: awake and alert  Post-procedure vital signs: reviewed and stable  Pain management: adequate  Airway patency: patent    PONV status at discharge: No PONV  Anesthetic complications: no      Cardiovascular status: blood pressure returned to baseline and hemodynamically stable  Respiratory status: unassisted and spontaneous ventilation  Hydration status: euvolemic  Follow-up not needed.          Vitals Value Taken Time   /89 08/18/20 0842   Temp 36.5 °C (97.7 °F) 08/18/20 0842   Pulse 82 08/18/20 0842   Resp 17 08/18/20 0842   SpO2 98 % 08/18/20 0842         Event Time   Out of Recovery 08/17/2020 16:13:43         Pain/Skyler Score: Pain Rating Prior to Med Admin: 10 (8/17/2020  5:00 PM)  Pain Rating Post Med Admin: 8 (8/17/2020  5:45 PM)  Skyler Score: 10 (8/17/2020  4:07 PM)

## 2020-08-18 NOTE — NURSING
MEWS Monitoring: Chart check completed, abnormal VS noted, B/P 195/91,  bedside RN, Ariel contacted, no concerns verbalized at this time, instructed to call [phone] for further concerns or assistance..

## 2020-08-18 NOTE — ASSESSMENT & PLAN NOTE
Uncontrolled.  Norvasc stopped with lower extremity edema.  Started on Hydralazine.  Increase as needed.

## 2020-08-18 NOTE — PLAN OF CARE
Important Message from Medicare and discharge appeal process reviewed with patient; patient was given opportunity to ask questions; after which, verbalized understanding of rights; copy was placed in medical record chart and one copy given to patient to place in his [employer] Folder for his review and records        08/18/20 1614   Medicare Message   Important Message from Medicare regarding Discharge Appeal Rights Given to patient/caregiver;Explained to patient/caregiver;Signed/date by patient/caregiver   Date IMM was signed 08/18/20   Time IMM was signed 1500

## 2020-08-18 NOTE — ASSESSMENT & PLAN NOTE
Patient with profound edema - anasarca up to abdomen - 2-3+ pitting after overnight diuresis + 3+ proteinuria - CKD stage 4 worsened from creatine baseline up to creatine of around 3-->5.8, severe activity intolerance - Nephrology consulted and following - diureses, strict I&Os, daily weight  -stop amlodipine - added labetalol 200 mg per nephrology.  Also started on Hydralazine.  -follow labs

## 2020-08-18 NOTE — PROGRESS NOTES
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 Tay    CATARACT EXTRACTION W/  INTRAOCULAR LENS IMPLANT Left 10/19/2017    Dr. Tay    ESOPHAGOGASTRODUODENOSCOPY N/A 8/17/2020    Procedure: EGD (ESOPHAGOGASTRODUODENOSCOPY);  Surgeon: Desmond Chapman MD;  Location: [facility];  Service: Endoscopy;  Laterality: N/A;    FEMORAL ARTERY STENT      KNEE SURGERY      bilateral scope     Review of patient's allergies indicates:   Allergen Reactions    Ace inhibitors      Hyperkalemia 8/2018    Penicillins Hives    Tizanidine      Felt hot       Current Facility-Administered Medications   Medication    acetaminophen tablet 650 mg    atorvastatin tablet 80 mg    calcitRIOL capsule 0.25 mcg    calcium acetate(phosphat bind) capsule 667 mg    clopidogreL tablet 75 mg    furosemide injection 40 mg    hydrALAZINE injection 10 mg    hydrALAZINE tablet 25 mg    labetaloL tablet 200 mg    magnesium oxide tablet 400 mg    melatonin tablet 6 mg    metoprolol injection 5 mg    phenytoin (DILANTIN) ER capsule 100 mg    sodium chloride 0.9% flush 10 mL    sodium chloride 0.9% flush 10 mL    tamsulosin 24 hr capsule 0.8 mg       LABS    Recent Results (from the past 24 hour(s))   POCT glucose    Collection Time: 08/17/20 11:19 AM   Result Value Ref Range    POCT Glucose 115 (H) 70 - 110 mg/dL   Iron and TIBC    Collection Time: 08/17/20 11:23 AM   Result Value Ref Range    Iron 63 45 - 160 ug/dL    Transferrin 141 (L) 200 - 375 mg/dL    TIBC 209 (L) 250 - 450 ug/dL    Saturated Iron 30 20 - 50 %   Ferritin    Collection Time: 08/17/20 11:23 AM   Result Value Ref Range    Ferritin  285 20.0 - 300.0 ng/mL   POCT glucose    Collection Time: 08/17/20  4:50 PM   Result Value Ref Range    POCT Glucose 153 (H) 70 - 110 mg/dL   CK-MB    Collection Time: 08/17/20  8:27 PM   Result Value Ref Range    CPK 1293 (H) 20 - 200 U/L    CPK MB 11.1 (H) 0.1 - 6.5 ng/mL    MB% 0.9 0.0 - 5.0 %   CK-MB    Collection Time: 08/18/20  8:11 AM   Result Value Ref Range    CPK 1251 (H) 20 - 200 U/L    CPK MB 13.3 (H) 0.1 - 6.5 ng/mL    MB% 1.1 0.0 - 5.0 %   Comprehensive metabolic panel    Collection Time: 08/18/20  8:11 AM   Result Value Ref Range    Sodium 136 136 - 145 mmol/L    Potassium 6.1 (H) 3.5 - 5.1 mmol/L    Chloride 106 95 - 110 mmol/L    CO2 15 (L) 23 - 29 mmol/L    Glucose 147 (H) 70 - 110 mg/dL    BUN, Bld 75 (H) 8 - 23 mg/dL    Creatinine 6.4 (H) 0.5 - 1.4 mg/dL    Calcium 6.6 (LL) 8.7 - 10.5 mg/dL    Total Protein 6.2 6.0 - 8.4 g/dL    Albumin 2.7 (L) 3.5 - 5.2 g/dL    Total Bilirubin 0.3 0.1 - 1.0 mg/dL    Alkaline Phosphatase 101 55 - 135 U/L    AST 17 10 - 40 U/L    ALT 14 10 - 44 U/L    Anion Gap 15 8 - 16 mmol/L    eGFR if African American 10 (A) >60 mL/min/1.73 m^2    eGFR if non African American 8 (A) >60 mL/min/1.73 m^2   CBC auto differential    Collection Time: 08/18/20  8:11 AM   Result Value Ref Range    WBC 6.97 3.90 - 12.70 K/uL    RBC 3.42 (L) 4.60 - 6.20 M/uL    Hemoglobin 8.6 (L) 14.0 - 18.0 g/dL    Hematocrit 25.7 (L) 40.0 - 54.0 %    Mean Corpuscular Volume 75 (L) 82 - 98 fL    Mean Corpuscular Hemoglobin 25.1 (L) 27.0 - 31.0 pg    Mean Corpuscular Hemoglobin Conc 33.5 32.0 - 36.0 g/dL    RDW 14.4 11.5 - 14.5 %    Platelets 208 150 - 350 K/uL    MPV 10.4 9.2 - 12.9 fL    Immature Granulocytes 0.4 0.0 - 0.5 %    Gran # (ANC) 5.8 1.8 - 7.7 K/uL    Immature Grans (Abs) 0.03 0.00 - 0.04 K/uL    Lymph # 0.5 (L) 1.0 - 4.8 K/uL    Mono # 0.7 0.3 - 1.0 K/uL    Eos # 0.0 0.0 - 0.5 K/uL    Baso # 0.02 0.00 - 0.20 K/uL    nRBC 0 0 /100 WBC    Gran% 83.1 (H) 38.0 - 73.0 %    Lymph% 6.5 (L) 18.0 -  48.0 %    Mono% 9.6 4.0 - 15.0 %    Eosinophil% 0.1 0.0 - 8.0 %    Basophil% 0.3 0.0 - 1.9 %    Differential Method Automated    POCT glucose    Collection Time: 08/18/20  8:36 AM   Result Value Ref Range    POCT Glucose 144 (H) 70 - 110 mg/dL   ]    I/O last 3 completed shifts:  In: 1000 [P.O.:540; I.V.:100; Other:360]  Out: 1675 [Urine:1675]    Vitals:    08/18/20 0208 08/18/20 0433 08/18/20 0610 08/18/20 0842   BP: (!) 191/96 (!) 178/88 (!) 178/89 (!) 185/89   Pulse: 86 83  82   Resp:  20  17   Temp:  98.3 °F (36.8 °C)  97.7 °F (36.5 °C)   TempSrc:    Oral   SpO2:  98%  98%   Weight:  96.1 kg (211 lb 13.8 oz)     Height:           No Jvd, Thyromegaly or Lymphadenopathy  Lungs: Fairly clear anteriorly and laterally  Cor: RRR no G or rubs  Abd: Soft benign good bowel sounds non tender  Ext: Pos edema    A)    Nephrotic snd (with smallish renal shadows on US), likely diabetic in nature  Anemia  BPH (co inability to void better)  CKD4 creat continue to rise (not a good sign)  Hyperpth on binders and vitd analogs  DM  HTN  Obesity  Hyperpth     P)  Renal Diet  Home meds  Protect L arm for future access  EPO might be needed, check SPEP  Binders  Adjust all meds to the degree of renal fx  Close follow up I/O and weights  Maintain Hydration   Dialysis might be needed

## 2020-08-18 NOTE — PROGRESS NOTES
[facility]  Gastroenterology  Progress Note    Patient Name: Miguel Moore  MRN: [id number]  Admission Date: 8/15/2020  Hospital Length of Stay: 2 days  Code Status: Full Code   Attending Provider: Barrie Negro MD  Primary Care Physician: Raciel Raymond MD  Principal Problem: Nephrotic syndrome    Subjective:     CC= anemia/black stools    Interval History:     No acute events overnight.  Pt resting comfortably in bed with no new complaints.  Last bowel movement was two days ago and was dark.  He denies abd pain, nausea or vomiting.  He reports taking Pepto-Bismol prior to having dark stools.    Review of systems:  General: Negative for fevers, chills.  Cardiovascular:  Negative for chest pain, shortness of breath     Objective:     Vital Signs (Most Recent):  Temp: 97.7 °F (36.5 °C) (08/18/20 0842)  Pulse: 82 (08/18/20 0842)  Resp: 17 (08/18/20 0842)  BP: (!) 185/89 (08/18/20 0842)  SpO2: 98 % (08/18/20 0842) Vital Signs (24h Range):  Temp:  [97.7 °F (36.5 °C)-98.3 °F (36.8 °C)] 97.7 °F (36.5 °C)  Pulse:  [80-97] 82  Resp:  [6-20] 17  SpO2:  [96 %-100 %] 98 %  BP: (169-215)/() 185/89     Physical examination:  GEN: Well developed, well nourished in no apparent distress   HENT: Normocephalic, anicteric sclera   Cardiovascular: Regular rate and rhythm. No murmurs appreciated.   Chest: Non-labored respirations. Breath sounds equal   Abdomen: Soft, NTND, normoactive BS  Psych: Appropriate mood and affect.   Extermities: No C/C/E. 2+ dorsalis pedis pulses bilaterally    CBC:   Recent Labs   Lab 08/17/20  0448 08/18/20  0811   WBC 4.55 6.97   HGB 8.4* 8.6*   HCT 25.9* 25.7*    208         Assessment:   65 year old male with a history of HTN, DM, CKD, CVA and seizures presenting with acute renal failure, subjective melena and a microcytic anemia.  H/H with significant decline over the last year, though stable this admission.  Iron studies normal.  Some discoloration and nodularity in  the stomach seen on EGD yesterday, though no source of blood loss.  Suspect dark stools related to recent Pepto-Bismol.    Plan:   1.  Follow up gastric biopsies.  2.  Trend H/H & transfuse if Hb <7g/dL.  3.  Outpatient colonoscopy in near future.  4.  If colonoscopy unrevealing, may need small bowel capsule +/- hematology evaluation.  5.  Nothing left to add as inpatient from our standpoint. Will arrange clinic follow up.      Abby Bryant PA-C  Gastroenterology  [facility]

## 2020-08-19 LAB
1,25(OH)2D3 SERPL-MCNC: 8 PG/ML (ref 20–79)
ALBUMIN SERPL BCP-MCNC: 2.8 G/DL (ref 3.5–5.2)
ALP SERPL-CCNC: 94 U/L (ref 55–135)
ALT SERPL W/O P-5'-P-CCNC: 18 U/L (ref 10–44)
ANION GAP SERPL CALC-SCNC: 10 MMOL/L (ref 8–16)
AST SERPL-CCNC: 20 U/L (ref 10–40)
BILIRUB SERPL-MCNC: 0.2 MG/DL (ref 0.1–1)
BUN SERPL-MCNC: 81 MG/DL (ref 8–23)
CALCIUM SERPL-MCNC: 6.8 MG/DL (ref 8.7–10.5)
CHLORIDE SERPL-SCNC: 105 MMOL/L (ref 95–110)
CO2 SERPL-SCNC: 18 MMOL/L (ref 23–29)
CREAT SERPL-MCNC: 6.9 MG/DL (ref 0.5–1.4)
EST. GFR  (AFRICAN AMERICAN): 9 ML/MIN/1.73 M^2
EST. GFR  (NON AFRICAN AMERICAN): 8 ML/MIN/1.73 M^2
GLUCOSE SERPL-MCNC: 137 MG/DL (ref 70–110)
INTERPRETATION SERPL IFE-IMP: NORMAL
POCT GLUCOSE: 123 MG/DL (ref 70–110)
POCT GLUCOSE: 136 MG/DL (ref 70–110)
POCT GLUCOSE: 147 MG/DL (ref 70–110)
POCT GLUCOSE: 200 MG/DL (ref 70–110)
POTASSIUM SERPL-SCNC: 6.1 MMOL/L (ref 3.5–5.1)
PROT SERPL-MCNC: 6.3 G/DL (ref 6–8.4)
SODIUM SERPL-SCNC: 133 MMOL/L (ref 136–145)

## 2020-08-19 PROCEDURE — 63600175 PHARM REV CODE 636 W HCPCS: Performed by: HOSPITALIST

## 2020-08-19 PROCEDURE — 25000003 PHARM REV CODE 250: Performed by: HOSPITALIST

## 2020-08-19 PROCEDURE — 97162 PT EVAL MOD COMPLEX 30 MIN: CPT

## 2020-08-19 PROCEDURE — 25000003 PHARM REV CODE 250: Performed by: INTERNAL MEDICINE

## 2020-08-19 PROCEDURE — 80053 COMPREHEN METABOLIC PANEL: CPT

## 2020-08-19 PROCEDURE — 21400001 HC TELEMETRY ROOM

## 2020-08-19 PROCEDURE — 97166 OT EVAL MOD COMPLEX 45 MIN: CPT

## 2020-08-19 PROCEDURE — 63600175 PHARM REV CODE 636 W HCPCS: Performed by: INTERNAL MEDICINE

## 2020-08-19 PROCEDURE — 36415 COLL VENOUS BLD VENIPUNCTURE: CPT

## 2020-08-19 PROCEDURE — 97530 THERAPEUTIC ACTIVITIES: CPT

## 2020-08-19 RX ADMIN — TRAZODONE HYDROCHLORIDE 50 MG: 50 TABLET ORAL at 10:08

## 2020-08-19 RX ADMIN — CALCIUM ACETATE 667 MG: 667 CAPSULE ORAL at 04:08

## 2020-08-19 RX ADMIN — HYDRALAZINE HYDROCHLORIDE 50 MG: 25 TABLET, FILM COATED ORAL at 10:08

## 2020-08-19 RX ADMIN — MAGNESIUM OXIDE TAB 400 MG (241.3 MG ELEMENTAL MG) 400 MG: 400 (241.3 MG) TAB at 10:08

## 2020-08-19 RX ADMIN — TAMSULOSIN HYDROCHLORIDE 0.8 MG: 0.4 CAPSULE ORAL at 09:08

## 2020-08-19 RX ADMIN — FUROSEMIDE 40 MG: 10 INJECTION, SOLUTION INTRAVENOUS at 04:08

## 2020-08-19 RX ADMIN — CALCITRIOL CAPSULES 0.25 MCG 0.25 MCG: 0.25 CAPSULE ORAL at 09:08

## 2020-08-19 RX ADMIN — INSULIN ASPART 2 UNITS: 100 INJECTION, SOLUTION INTRAVENOUS; SUBCUTANEOUS at 12:08

## 2020-08-19 RX ADMIN — PHENYTOIN SODIUM 100 MG: 100 CAPSULE ORAL at 09:08

## 2020-08-19 RX ADMIN — CALCIUM ACETATE 667 MG: 667 CAPSULE ORAL at 10:08

## 2020-08-19 RX ADMIN — HYDRALAZINE HYDROCHLORIDE 50 MG: 25 TABLET, FILM COATED ORAL at 03:08

## 2020-08-19 RX ADMIN — HYDRALAZINE HYDROCHLORIDE 50 MG: 25 TABLET, FILM COATED ORAL at 05:08

## 2020-08-19 RX ADMIN — ATORVASTATIN CALCIUM 80 MG: 40 TABLET, FILM COATED ORAL at 09:08

## 2020-08-19 RX ADMIN — CLOPIDOGREL 75 MG: 75 TABLET, FILM COATED ORAL at 09:08

## 2020-08-19 RX ADMIN — LABETALOL HYDROCHLORIDE 200 MG: 100 TABLET, FILM COATED ORAL at 10:08

## 2020-08-19 RX ADMIN — PHENYTOIN SODIUM 100 MG: 100 CAPSULE ORAL at 10:08

## 2020-08-19 RX ADMIN — PHENYTOIN SODIUM 100 MG: 100 CAPSULE ORAL at 03:08

## 2020-08-19 RX ADMIN — CALCIUM GLUCONATE 1000 MG: 98 INJECTION, SOLUTION INTRAVENOUS at 12:08

## 2020-08-19 RX ADMIN — LABETALOL HYDROCHLORIDE 200 MG: 100 TABLET, FILM COATED ORAL at 09:08

## 2020-08-19 NOTE — ASSESSMENT & PLAN NOTE
Patient with profound edema - anasarca up to abdomen - 2-3+ pitting after overnight diuresis + 3+ proteinuria - CKD stage 4 worsened from creatine baseline up to creatine of around 3-->5.8, severe activity intolerance - Nephrology consulted and following - diureses, strict I&Os, daily weight  -stop amlodipine - added labetalol 200 mg per nephrology.  Also started on Hydralazine.  Increasing Creat with persistent hyperkalemia.  Will probably need HD.

## 2020-08-19 NOTE — PROGRESS NOTES
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 Tay    CATARACT EXTRACTION W/  INTRAOCULAR LENS IMPLANT Left 10/19/2017    Dr. Tay    ESOPHAGOGASTRODUODENOSCOPY N/A 8/17/2020    Procedure: EGD (ESOPHAGOGASTRODUODENOSCOPY);  Surgeon: Desmond Chapman MD;  Location: [facility];  Service: Endoscopy;  Laterality: N/A;    FEMORAL ARTERY STENT      KNEE SURGERY      bilateral scope     Review of patient's allergies indicates:   Allergen Reactions    Ace inhibitors      Hyperkalemia 8/2018    Penicillins Hives    Tizanidine      Felt hot       Current Facility-Administered Medications   Medication    acetaminophen tablet 650 mg    atorvastatin tablet 80 mg    calcitRIOL capsule 0.25 mcg    calcium acetate(phosphat bind) capsule 667 mg    calcium gluconate 1g in dextrose 5% 100mL (ready to mix system)    clopidogreL tablet 75 mg    dextrose 50% injection 12.5 g    dextrose 50% injection 25 g    furosemide injection 40 mg    glucagon (human recombinant) injection 1 mg    glucose chewable tablet 16 g    glucose chewable tablet 24 g    hydrALAZINE injection 10 mg    hydrALAZINE tablet 50 mg    insulin aspart U-100 pen 1-10 Units    labetaloL tablet 200 mg    magnesium oxide tablet 400 mg    melatonin tablet 6 mg    metoprolol injection 5 mg    phenytoin (DILANTIN) ER capsule 100 mg    sodium chloride 0.9% flush 10 mL    sodium chloride 0.9% flush 10 mL    tamsulosin 24 hr capsule 0.8 mg    traZODone tablet 50 mg       LABS    Recent Results (from the past 24 hour(s))   POCT glucose    Collection Time: 08/18/20 12:01 PM   Result Value Ref Range    POCT Glucose  163 (H) 70 - 110 mg/dL   POCT glucose    Collection Time: 08/18/20  4:28 PM   Result Value Ref Range    POCT Glucose 150 (H) 70 - 110 mg/dL   POCT glucose    Collection Time: 08/18/20  8:28 PM   Result Value Ref Range    POCT Glucose 155 (H) 70 - 110 mg/dL   Comprehensive metabolic panel    Collection Time: 08/19/20  3:20 AM   Result Value Ref Range    Sodium 133 (L) 136 - 145 mmol/L    Potassium 6.1 (H) 3.5 - 5.1 mmol/L    Chloride 105 95 - 110 mmol/L    CO2 18 (L) 23 - 29 mmol/L    Glucose 137 (H) 70 - 110 mg/dL    BUN, Bld 81 (H) 8 - 23 mg/dL    Creatinine 6.9 (H) 0.5 - 1.4 mg/dL    Calcium 6.8 (LL) 8.7 - 10.5 mg/dL    Total Protein 6.3 6.0 - 8.4 g/dL    Albumin 2.8 (L) 3.5 - 5.2 g/dL    Total Bilirubin 0.2 0.1 - 1.0 mg/dL    Alkaline Phosphatase 94 55 - 135 U/L    AST 20 10 - 40 U/L    ALT 18 10 - 44 U/L    Anion Gap 10 8 - 16 mmol/L    eGFR if African American 9 (A) >60 mL/min/1.73 m^2    eGFR if non African American 8 (A) >60 mL/min/1.73 m^2   POCT glucose    Collection Time: 08/19/20  7:53 AM   Result Value Ref Range    POCT Glucose 123 (H) 70 - 110 mg/dL   ]    No intake/output data recorded.    Vitals:    08/18/20 2332 08/19/20 0443 08/19/20 0559 08/19/20 0756   BP: 133/63 (!) 140/65 (!) 146/65 (!) 117/57   Pulse: 82 79  80   Resp: 19 19 18   Temp: 97.9 °F (36.6 °C)   98 °F (36.7 °C)   TempSrc: Oral   Oral   SpO2: 99% 100%  100%   Weight:  98 kg (216 lb 0.8 oz)     Height:           No Jvd, Thyromegaly or Lymphadenopathy  Lungs: Fairly clear anteriorly and laterally  Cor: RRR no G or rubs  Abd: Soft benign good bowel sounds non tender  Ext: Pos edema    A)    Nephrotic snd (with smallish renal shadows on US), likely diabetic in nature. We talked about possible HD but pt is completely asx without signs of uremia. ( studies does not support early hd)  Anemia  BPH (co inability to void better)  CKD4 creat continue to rise (not a good sign)  Hyperpth on binders and vitd  analogs  DM  HTN  Obesity  Hyperpth     P)  Renal Diet  Home meds  Protect L arm for future access  EPO might be needed  Binders  Adjust all meds to the degree of renal fx  Close follow up I/O and weights  Maintain Hydration   Dialysis might be needed in 1-2 days pt aware

## 2020-08-19 NOTE — PROGRESS NOTES
Report received from DARRION Phillips. Patient care assumed. Patient lying in bed without any acute distress noted at this time.

## 2020-08-19 NOTE — SUBJECTIVE & OBJECTIVE
Interval History: About the same.    Review of Systems   HENT: Negative for ear discharge and ear pain.    Eyes: Negative for discharge and itching.   Endocrine: Negative for cold intolerance and heat intolerance.   Neurological: Negative for seizures and syncope.     Objective:     Vital Signs (Most Recent):  Temp: 98.4 °F (36.9 °C) (08/19/20 1245)  Pulse: 76 (08/19/20 1245)  Resp: 16 (08/19/20 1245)  BP: (!) 114/58 (08/19/20 1245)  SpO2: 99 % (08/19/20 1245) Vital Signs (24h Range):  Temp:  [97.5 °F (36.4 °C)-98.4 °F (36.9 °C)] 98.4 °F (36.9 °C)  Pulse:  [69-82] 76  Resp:  [16-19] 16  SpO2:  [98 %-100 %] 99 %  BP: (114-146)/(57-65) 114/58     Weight: 98 kg (216 lb 0.8 oz)  Body mass index is 32.85 kg/m².    Intake/Output Summary (Last 24 hours) at 8/19/2020 1348  Last data filed at 8/19/2020 0443  Gross per 24 hour   Intake 0 ml   Output --   Net 0 ml      Physical Exam  Vitals signs and nursing note reviewed.   Constitutional:       General: He is not in acute distress.     Appearance: He is well-developed.   HENT:      Head: Normocephalic and atraumatic.      Right Ear: External ear normal.      Left Ear: External ear normal.      Nose: Nose normal.   Eyes:      Conjunctiva/sclera: Conjunctivae normal.      Pupils: Pupils are equal, round, and reactive to light.   Neck:      Musculoskeletal: Normal range of motion and neck supple.   Cardiovascular:      Rate and Rhythm: Normal rate and regular rhythm.   Pulmonary:      Effort: Pulmonary effort is normal. No respiratory distress.      Breath sounds: Rales present. No wheezing.   Abdominal:      General: Bowel sounds are normal. There is no distension.      Palpations: Abdomen is soft.      Tenderness: There is no abdominal tenderness.      Comments: No palpable hepatomegaly or splenomegaly   Musculoskeletal:         General: Swelling present. No tenderness.      Right lower leg: Edema present.      Left lower leg: Edema present.      Comments: Activity  intolerance   Skin:     General: Skin is warm and dry.      Comments: Swelling and pitting edema up to abdomen and flank   Neurological:      Mental Status: He is alert and oriented to person, place, and time.      Motor: Weakness present.   Psychiatric:         Thought Content: Thought content normal.         Significant Labs:   BMP:   Recent Labs   Lab 08/19/20  0320   *   *   K 6.1*      CO2 18*   BUN 81*   CREATININE 6.9*   CALCIUM 6.8*     CBC:   Recent Labs   Lab 08/18/20  0811   WBC 6.97   HGB 8.6*   HCT 25.7*          Significant Imaging: I have reviewed all pertinent imaging results/findings within the past 24 hours.

## 2020-08-19 NOTE — PLAN OF CARE
Problem: Fall Injury Risk  Goal: Absence of Fall and Fall-Related Injury  Outcome: Ongoing, Progressing     Problem: Adult Inpatient Plan of Care  Goal: Plan of Care Review  8/19/2020 1802 by Zoe White RN  Flowsheets (Taken 8/19/2020 1802)  Plan of Care Reviewed With: patient  8/19/2020 1801 by Zoe White RN  Flowsheets (Taken 8/19/2020 1801)  Plan of Care Reviewed With: patient     Problem: Infection  Goal: Infection Symptom Resolution  Intervention: Prevent or Manage Infection  Flowsheets (Taken 8/19/2020 1802)  Fever Reduction/Comfort Measures: lightweight bedding  Infection Management: aseptic technique maintained     Problem: Fluid Volume Excess  Goal: Fluid Balance  Intervention: Monitor and Manage Hypervolemia  Flowsheets (Taken 8/19/2020 1802)  Skin Protection:   adhesive use limited   protective footwear used   pouching devices used  Fluid/Electrolyte Management: fluids restricted

## 2020-08-19 NOTE — PLAN OF CARE
Problem: Physical Therapy Goal  Goal: Physical Therapy Goal  Description: Goals to be met by:     Patient will increase functional independence with mobility by performin. Supine to sit with Stand-by Assistance  2. Sit to supine with Stand-by Assistance  3. Sit to stand transfer with Stand-by Assistance using Quad Cane.  4. Bed to chair transfer with Minimal Assistance using Quad Cane and stand pivot transfer.  5. Bed to Wheelchair transfer with Stand-by Assistance and squat pivot transfer.  6. Gait x 50 feet with Minimal Assistance using Quad Cane.   7. Stand for 20 minutes with Stand-by Assistance using Quad Cane  8. Lower extremity exercise program x20 reps per handout, with assistance as needed    Outcome: Ongoing, Progressing     Recommend SNF. Pt performed bed<>chair transfer with max A with 2 people and quad cane

## 2020-08-19 NOTE — PLAN OF CARE
Problem: Occupational Therapy Goal  Goal: Occupational Therapy Goal  Description: Goals to be met by: 09/02/20     Patient will increase functional independence with ADLs by performing:    Feeding with Modified [place].  UE Dressing with Modified [place].  LE Dressing with Minimal Assistance.  Grooming while seated with Modified [place].  Toileting from bedside commode with Stand-by Assistance for hygiene and clothing management.   Rolling to Bilateral with Supervision.   Supine to sit with Supervision.  Step transfer with Stand-by Assistance  Stand pivot transfer with Stand-by Assistance  Bed>wheelchair transfer with Stand-by assistance   Pt will manually propel w/c household distance with MOD I.   Toilet transfer to bedside commode with Stand-by Assistance.  Upper extremity exercise program x15 reps per handout, with independence.    Outcome: Ongoing, Progressing     MAX A x2 with SBQC to stand and take a few steps from bed> chair. OT rec SNF at discharge in order to address functional deficits and increase safety & independence with ADLs and all aspects of functional mobility.

## 2020-08-19 NOTE — PROGRESS NOTES
[facility] Hospital Medicine  Progress Note    Patient Name: Miguel Moore  MRN: [id number]  Patient Class: IP- Inpatient   Admission Date: 8/15/2020  Length of Stay: 3 days  Attending Physician: Barrie Negro MD  Primary Care Provider: Raciel Raymond MD        Subjective:     Principal Problem:Nephrotic syndrome        HPI:  65 y.o. 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  States he was called on 8/13/2020, but did not come to the ED until today.  He reports mild edema and weight gain of 1-2 lb.  Denies fever, chills, cough, SOB, chest pain, palpitations, orthopnea, PND, dizziness, syncope, n/v/d, abd pain, or dysuria.  In the ED, Cr/BUN 5.3/64, K+ 5.4, H/H 7.5/22.7, CPK 1262, , UA shows proteinura 3+, renal US suggest chronic medical renal disease.  His Nephrologist is Dr. Roa, last visit Oct 2019.  Nephrology consult.  Placed in observation.    Overview/Hospital Course:  65 y.o. AAM with history significant for DMII, HTN, stroke, and CKD4 presents for evaluation of worsening CKD sent in by provider. Creatine = 2.0 1 year ago now with creatine 5.4 and potassium 5.8 worsening swelling and anasarca 2-3+ edema up to abdomen and flank, activity intolerance and orthopnea X 5 pillows. He is not on hemodialysis.  Amlodipine stopped.  Worsening BP and started on Hydralazine.  Nephrology consulted.  BP improved, but worsening Creat with persistent hyperkalemia.    Interval History: About the same.    Review of Systems   HENT: Negative for ear discharge and ear pain.    Eyes: Negative for discharge and itching.   Endocrine: Negative for cold intolerance and heat intolerance.   Neurological: Negative for seizures and syncope.     Objective:     Vital Signs (Most Recent):  Temp: 98.4 °F (36.9 °C)  (08/19/20 1245)  Pulse: 76 (08/19/20 1245)  Resp: 16 (08/19/20 1245)  BP: (!) 114/58 (08/19/20 1245)  SpO2: 99 % (08/19/20 1245) Vital Signs (24h Range):  Temp:  [97.5 °F (36.4 °C)-98.4 °F (36.9 °C)] 98.4 °F (36.9 °C)  Pulse:  [69-82] 76  Resp:  [16-19] 16  SpO2:  [98 %-100 %] 99 %  BP: (114-146)/(57-65) 114/58     Weight: 98 kg (216 lb 0.8 oz)  Body mass index is 32.85 kg/m².    Intake/Output Summary (Last 24 hours) at 8/19/2020 1348  Last data filed at 8/19/2020 0443  Gross per 24 hour   Intake 0 ml   Output --   Net 0 ml      Physical Exam  Vitals signs and nursing note reviewed.   Constitutional:       General: He is not in acute distress.     Appearance: He is well-developed.   HENT:      Head: Normocephalic and atraumatic.      Right Ear: External ear normal.      Left Ear: External ear normal.      Nose: Nose normal.   Eyes:      Conjunctiva/sclera: Conjunctivae normal.      Pupils: Pupils are equal, round, and reactive to light.   Neck:      Musculoskeletal: Normal range of motion and neck supple.   Cardiovascular:      Rate and Rhythm: Normal rate and regular rhythm.   Pulmonary:      Effort: Pulmonary effort is normal. No respiratory distress.      Breath sounds: Rales present. No wheezing.   Abdominal:      General: Bowel sounds are normal. There is no distension.      Palpations: Abdomen is soft.      Tenderness: There is no abdominal tenderness.      Comments: No palpable hepatomegaly or splenomegaly   Musculoskeletal:         General: Swelling present. No tenderness.      Right lower leg: Edema present.      Left lower leg: Edema present.      Comments: Activity intolerance   Skin:     General: Skin is warm and dry.      Comments: Swelling and pitting edema up to abdomen and flank   Neurological:      Mental Status: He is alert and oriented to person, place, and time.      Motor: Weakness present.   Psychiatric:         Thought Content: Thought content normal.         Significant Labs:   BMP:   Recent  Labs   Lab 08/19/20  0320   *   *   K 6.1*      CO2 18*   BUN 81*   CREATININE 6.9*   CALCIUM 6.8*     CBC:   Recent Labs   Lab 08/18/20  0811   WBC 6.97   HGB 8.6*   HCT 25.7*          Significant Imaging: I have reviewed all pertinent imaging results/findings within the past 24 hours.      Assessment/Plan:      * Nephrotic syndrome  Patient with profound edema - anasarca up to abdomen - 2-3+ pitting after overnight diuresis + 3+ proteinuria - CKD stage 4 worsened from creatine baseline up to creatine of around 3-->5.8, severe activity intolerance - Nephrology consulted and following - diureses, strict I&Os, daily weight  -stop amlodipine - added labetalol 200 mg per nephrology.  Also started on Hydralazine.  Increasing Creat with persistent hyperkalemia.  Will probably need HD.    Hyperkalemia  Will treat with Calcium Gluconate      Anemia  Anemia of CKD  H/H stable since admission with no evidence of bleeding.        Enlarged prostate  Continue home flomax - voiding well - strict I&Os      Hypocalcemia  Calcium = 6 corrected = 7.2 - nephrology following calcitrol 0.25 mcg and calcium acetate 667 po tid AC    Anasarca  See above      Non-traumatic rhabdomyolysis  CPK has remained around 1200    Type 2 diabetes mellitus with diabetic neuropathy, with long-term current use of insulin        CKD (chronic kidney disease) stage 4, GFR 15-29 ml/min with proteinuria  Acute on chronic CKD stage 4  Appreciate Nephrology input.  As above.    Hemiplegia and hemiparesis following cerebral infarction affecting right dominant side  Stable, no acute issue, continue plavix/statin.  PT/OT evaluation.    Seizure disorder as sequela of cerebrovascular accident  Continue home regimen of phenytoin    Benign non-nodular prostatic hyperplasia without lower urinary tract symptoms  Continue home regimen of tamsulosin     Controlled type 2 diabetes mellitus with stage 4 chronic kidney disease, with long-term  current use of insulin  Last HgbA1c   Lab Results   Component Value Date    HGBA1C 5.5 08/13/2020     Hold oral antihyperglycemics while inpatient  PRN sliding scale insulin  ACHS glucose monitoring   ADA diet     Pure hypercholesterolemia  Continue statin    Benign essential HTN; ACEI hyperK  Uncontrolled.  Norvasc stopped with lower extremity edema.  Started on Hydralazine.  Increase as needed.      VTE Risk Mitigation (From admission, onward)         Ordered     IP VTE HIGH RISK PATIENT  Once      08/15/20 1505     Place sequential compression device  Until discontinued      08/15/20 1505                Discharge Planning   GARY:      Code Status: Full Code   Is the patient medically ready for discharge?:     Reason for patient still in hospital (select all that apply): Patient unstable  Discharge Plan A: Home(With significant other and family assist)                  Barrie Negro MD  Department of Hospital Medicine   [facility]

## 2020-08-19 NOTE — PT/OT/SLP EVAL
"Physical Therapy Evaluation    Patient Name:  Miguel Moore   MRN:  [id number]    Recommendations:     Discharge Recommendations:  nursing facility, skilled   Discharge Equipment Recommendations: bedside commode   Barriers to discharge home: Decreased caregiver support, decreased mobility    Assessment:     Miguel Moore is a 65 y.o. male admitted with a medical diagnosis of Nephrotic syndrome.  He presents with the following impairments/functional limitations:  weakness, abnormal tone, impaired joint extensibility, impaired endurance, decreased ROM, impaired self care skills, decreased upper extremity function, decreased lower extremity function, edema, decreased safety awareness, gait instability, impaired balance.    Pt is pleasant and motivated to work with PT. Pt has significant R UE/LE hemiparalysis from previous stroke.    Rehab Prognosis: Fair; patient would benefit from acute skilled PT services to address these deficits and reach maximum level of function.    Recent Surgery: Procedure(s) (LRB):  EGD (ESOPHAGOGASTRODUODENOSCOPY) (N/A) 2 Days Post-Op    Plan:     During this hospitalization, patient to be seen 5 x/week to address the identified rehab impairments via therapeutic activities, gait training, therapeutic exercises, neuromuscular re-education and progress toward the following goals:    · Plan of Care Expires:  08/26/20    Subjective     Chief Complaint: "I feel uncomfortable in the bed"  Patient/Family Comments/goals: Pt states he primarily uses WC at home.  Pain/Comfort:  Pain Rating 1: 0/10    Patients cultural, spiritual, Christianity conflicts given the current situation: no    Living Environment:  Pt lives alone in [facility] with no [place].  Prior to admission, patients level of function was modified independent with ADLs and transfers from bed to WC and uses quad cane for ambulation.  Equipment used at home: wheelchair, cane, quad.  DME owned (not currently used): none.  Upon discharge, patient will " have limited assistance from his fiance and sisters that live locally.    Objective:     Patient found HOB elevated with peripheral IV and AVASYS upon PT entry to room.    General Precautions: Standard, fall, diabetic   Orthopedic Precautions:N/A   Braces: N/A     Exams:  · Sensation:    · -       Intact light/touch (B) LE  · Skin Integrity/Edema:      · -       Edema: Moderate, pitting (B) LE, R>L  · RLE ROM:   · PROM: Moderate hypertonicity with knee extension and ankle DF  · RLE Strength:   · Hip flexion: 1/5  · Knee flexion/extension: 1/5  · Ankle PF/DF: 1/5  · LLE ROM: WFL  · LLE Strength:   · Hip flexion: 4/5  · Knee flexion/extension: 5/5  · Ankle PF/DF: 5/5    Functional Mobility:  · Bed Mobility:     · Scooting: stand by assistance  · Supine to Sit: Pt required moderate assistance with 2 people to assist with (B) LE and upper trunk.  · Transfers:     · Sit to Stand: Pt required maximal assistance with 2 people with small based quad cane. Mod VC for hand placement onto quad cane and foot placement of R LE.  · Bed to Chair: Pt required maximal assistance with 2 people with small based quad cane using Stand Pivot.   · Gait: Pt performed 8 side steps with maximal assistance with 2 people with small based quad cane along the bed. Pt demonstrated forward trunk lean, decreased foot clearance ability, decreased weight shifting onto RLE, and decreased LUE WB onto quad cane during transfer.  · Balance: Good static sitting balance, Fair (-) static and dynamic standing      AM-PAC 6 CLICK MOBILITY  Total Score:13     Patient left reclined up in chair with AVASYS, seat cushion, all lines intact, call button in reach, chair alarm on and DARRION Phillips notified.  Tray table in front.    GOALS:   Multidisciplinary Problems     Physical Therapy Goals        Problem: Physical Therapy Goal    Goal Priority Disciplines Outcome Goal Variances Interventions   Physical Therapy Goal     PT, PT/OT Ongoing, Progressing     Description:  Goals to be met by:     Patient will increase functional independence with mobility by performin. Supine to sit with Stand-by Assistance  2. Sit to supine with Stand-by Assistance  3. Sit to stand transfer with Stand-by Assistance using Quad Cane.  4. Bed to chair transfer with Minimal Assistance using Quad Cane and stand pivot transfer.  5. Bed to Wheelchair transfer with Stand-by Assistance and squat pivot transfer.  6. Gait x 50 feet with Minimal Assistance using Quad Cane.   7. Stand for 20 minutes with Stand-by Assistance using Quad Cane  8. Lower extremity exercise program x20 reps per handout, with assistance as needed                     History:     Past Medical History:   Diagnosis Date    Diabetes mellitus, type 2     Hypertension     Nuclear sclerosis of both eyes 2017    Stroke     Urinary tract infection        Past Surgical History:   Procedure Laterality Date    CATARACT EXTRACTION W/  INTRAOCULAR LENS IMPLANT Right 10/05/2017    Dr. Tay    CATARACT EXTRACTION W/  INTRAOCULAR LENS IMPLANT Left 10/19/2017    Dr. Tay    ESOPHAGOGASTRODUODENOSCOPY N/A 2020    Procedure: EGD (ESOPHAGOGASTRODUODENOSCOPY);  Surgeon: Desmond Chapman MD;  Location: [facility];  Service: Endoscopy;  Laterality: N/A;    FEMORAL ARTERY STENT      KNEE SURGERY      bilateral scope       Time Tracking:     PT Received On: 20  PT Start Time: 42     PT Stop Time: 1001  PT Total Time (min): 19 min     Billable Minutes: Evaluation 19 co-eval with JEROD Martino, PT  2020

## 2020-08-19 NOTE — PLAN OF CARE
"Chart reviewed to address discharge needs and to update discharge plan; pt in with Nephrotic syndrome; noted to be hyperkalemic Nephrology consulted and suggest HD may be needed in 1-2 days; pt was evaluated by therapy who rec SNF; referrals sent 0 changes in previously given information    [place] spoke with nelson Castañeda at  regarding DC plan; stated that father lives alone with the help of his "girlfriend"; stated that patient once lived with her and felt that he has "dropped the ball" taking care of himself; stated that he is aware that he may need HD and would prefer [facility] since patient lives nearby; daughter stated that patient may need assistance at home; [place] provided resources for applying for Medicaid and LTC services; stated will forward information to patient's significant other    Situation: lives alone; help at home: daughter Maddy and Significant other    IMM: current expires 8/20    Appts:will need PCP/Neph    Needs: SNF referral    Dispo: SNF vs        08/19/20 6443   Discharge Reassessment   Assessment Type Discharge Planning Reassessment   Provided patient/caregiver education on the expected discharge date and the discharge plan Yes   Do you have any problems affording any of your prescribed medications? No   Discharge Plan A Skilled Nursing Facility   Discharge Plan B Home Health   DME Needed Upon Discharge  bedside commode   Anticipated Discharge Disposition SNF   Can the patient/caregiver answer the patient profile reliably? Yes, cognitively intact   How does the patient rate their overall health at the present time? Fair   Describe the patient's ability to walk at the present time. Walks with the help of equipment   How often would a person be available to care for the patient? Often   Post-Acute Status   Post-Acute Authorization Placement  (SNF)   Post-Acute Placement Status Referrals Sent   Discharge Delays None known at this time         "

## 2020-08-19 NOTE — PT/OT/SLP EVAL
Occupational Therapy   Evaluation    Name: Miguel Moore  MRN: [id number]  Admitting Diagnosis:  Nephrotic syndrome 2 Days Post-Op    Recommendations:     Discharge Recommendations: nursing facility, skilled  Discharge Equipment Recommendations:  bath bench, bedside commode  Barriers to discharge:  Decreased caregiver support(decreased mobility; increased assist required for all aspects of care)    Assessment:     Miguel Moore is a 65 y.o. male with a medical diagnosis of Nephrotic syndrome. Performance deficits affecting function: weakness, gait instability, decreased upper extremity function, decreased ROM, impaired endurance, impaired balance, decreased lower extremity function, decreased safety awareness, impaired fine motor, impaired self care skills, impaired functional mobilty, decreased coordination.      MAX A x2 with SBQC to stand and take a few steps from bed> chair. OT rec SNF at discharge in order to address functional deficits and increase safety & independence with ADLs and all aspects of functional mobility.     Rehab Prognosis: Good; patient would benefit from acute skilled OT services to address these deficits and reach maximum level of function.       Plan:     Patient to be seen (5-6x/wk) to address the above listed problems via self-care/home management, therapeutic activities, therapeutic exercises, neuromuscular re-education  · Plan of Care Expires: 09/02/20  · Plan of Care Reviewed with: patient    Subjective     Chief Complaint: stiffness   Patient/Family Comments/goals: agreeable to pause breakfast to finish it sitting up in the chair     Occupational Profile:  Living Environment: Pt lives alone in a [facility] with 0 [address]ADEEP. Bathroom set-up: tub with grab bars and no bench; raised toilet with grab bars.  Previous level of function: Pt reports MOD I with ADLs and functional mobility, primarily using his w/c; SBQC prn. Pt reports stretching his RUE when he remembers: shoulder abduction.   Roles and  Routines: cooks; does not drive   Equipment Used at Home:  wheelchair, raised toilet, grab bar(SBQC)  Assistance upon Discharge: daron, Ms. Webb, works; sister nearby/retired     Pain/Comfort:  · Pain Rating 1: 0/10    Patients cultural, spiritual, Jewish conflicts given the current situation: no    Objective:     Patient found HOB elevated with bed alarm, peripheral IV, telemetry(Avasys) upon OT entry to room.    General Precautions: Standard, fall, diabetic, seizure   Orthopedic Precautions:N/A   Braces: N/A     Occupational Performance:    Bed Mobility:    · Patient completed Rolling/Turning to Left with  maximal assistance  · Patient completed Scooting anteriorly with minimum assistance  · Patient completed Supine to Sit with moderate assistance, 2 persons and HOB elevated    Functional Mobility/Transfers:  · Patient completed Sit <> Stand Transfer with maximal assistance and of 2 persons  with  SBQC and RUE supported   · Functional Mobility: Pt took a few sidesteps at EOB to the chair with MAX A x2 using SBQC and RUE supported.     Activities of Daily Living:  · Feeding:  pt attempted multiple times to open the seal off the juice and unable to, requiring total A. pt able to feed himself food with LUE with everything set-up. Set-up for straw in cup. (offered to re-heat breakfast but pt declined)  · Upper Body Dressing: maximal assistance to don back gown    Cognitive/Visual Perceptual:  Cognitive/Psychosocial Skills:     -       Follows Commands/attention:Follows most simple commands   -       Communication: clear/fluent  -       Memory: Poor immediate recall  -       Safety awareness/insight to disability: impaired   -       Mood/Affect/Coping skills/emotional control: Cooperative  Visual/Perceptual:      -Intact  acuity and R/L discrimination      Physical Exam:  Balance:    -       static sit: SBA; dynamic sit: SBA/CGA; static stand: MOD A x2; dynamic stand: MAX A x2  Postural examination/scapula  alignment:    -       Rounded shoulders  -       Forward head  -       Extensor synergy pattern to RUE  Skin integrity: Visible skin intact  Edema:  no BUE edema noted  Sensation:    -       Intact  light/touch BUE  Dominant hand:    -       Pt is ambidextrous; was R-hand dominant prior to CVA; now, pt uses L hand   Upper Extremity Range of Motion:     -       Right Upper Extremity: able to stretch some to shoulder abduction actively; appears in extensor synergy pattern  -       Left Upper Extremity: WFL  Upper Extremity Strength:    -       Right Upper Extremity: impaired  -       Left Upper Extremity: WFL   Strength:    -       Right Upper Extremity: impaired  -       Left Upper Extremity: WFL  Fine Motor Coordination:    -       Intact  Left hand, manipulation of objects  -       Impaired  Right hand, manipulation of objects    Gross motor coordination:   impaired    AMPAC 6 Click ADL:  AMPAC Total Score: 14    Treatment & Education:  · Pt educated on OT role/POC.   · Importance of OOB activity with staff assistance. Encouraged pt to sit up in the chair through breakfast for ~1 to 2 hours then call for assist back to bed. Pt agreed.   · OT demo'd stretches pt can try to RUE throughout the day passively using LUE: digits extension, forearm supination/pronation, elbow extension  · Safety during functional t/f and mobility   · White board updated: level 2  · Multiple self-care tasks/functional mobility completed- assistance level noted above   · Increased time taken to don back gown: sequencing: RUE threaded through sleeve first   · Increased time taken to attempt in opening food packets with great difficulty- max assist required then pt able to feed self with set-up  · All questions/concerns answered within OT scope of practice     Education:    Patient left up in chair reclined with BLE offloaded and  all lines intact, call button in reach, chair alarm on and nurseJacqueline and Avasys present    GOALS:    Multidisciplinary Problems     Occupational Therapy Goals        Problem: Occupational Therapy Goal    Goal Priority Disciplines Outcome Interventions   Occupational Therapy Goal     OT, PT/OT Ongoing, Progressing    Description: Goals to be met by: 09/02/20     Patient will increase functional independence with ADLs by performing:    Feeding with Modified [place].  UE Dressing with Modified [place].  LE Dressing with Minimal Assistance.  Grooming while seated with Modified [place].  Toileting from bedside commode with Stand-by Assistance for hygiene and clothing management.   Rolling to Bilateral with Supervision.   Supine to sit with Supervision.  Step transfer with Stand-by Assistance  Stand pivot transfer with Stand-by Assistance  Bed>wheelchair transfer with Stand-by assistance   Pt will manually propel w/c household distance with MOD I.   Toilet transfer to bedside commode with Stand-by Assistance.  Upper extremity exercise program x15 reps per handout, with independence.                     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 Tya    CATARACT EXTRACTION W/  INTRAOCULAR LENS IMPLANT Left 10/19/2017    Dr. Tay    ESOPHAGOGASTRODUODENOSCOPY N/A 8/17/2020    Procedure: EGD (ESOPHAGOGASTRODUODENOSCOPY);  Surgeon: Desmond Chapman MD;  Location: [facility];  Service: Endoscopy;  Laterality: N/A;    FEMORAL ARTERY STENT      KNEE SURGERY      bilateral scope       Time Tracking:     OT Date of Treatment: 08/19/20  OT Start Time: 0942  OT Stop Time: 1005  OT Total Time (min): 23 min    Billable Minutes:Evaluation 15 min  Therapeutic Activity 8 min  Total Time 23 min (co-eval with PT)    Minerva Pleitez  OT  8/19/2020

## 2020-08-20 LAB
ALBUMIN SERPL BCP-MCNC: 2.6 G/DL (ref 3.5–5.2)
ALP SERPL-CCNC: 102 U/L (ref 55–135)
ALT SERPL W/O P-5'-P-CCNC: 18 U/L (ref 10–44)
ANION GAP SERPL CALC-SCNC: 12 MMOL/L (ref 8–16)
AST SERPL-CCNC: 24 U/L (ref 10–40)
BASOPHILS # BLD AUTO: 0.03 K/UL (ref 0–0.2)
BASOPHILS NFR BLD: 0.5 % (ref 0–1.9)
BILIRUB SERPL-MCNC: 0.2 MG/DL (ref 0.1–1)
BUN SERPL-MCNC: 87 MG/DL (ref 8–23)
CALCIUM SERPL-MCNC: 6.4 MG/DL (ref 8.7–10.5)
CHLORIDE SERPL-SCNC: 104 MMOL/L (ref 95–110)
CO2 SERPL-SCNC: 17 MMOL/L (ref 23–29)
CREAT SERPL-MCNC: 7.5 MG/DL (ref 0.5–1.4)
DIFFERENTIAL METHOD: ABNORMAL
EOSINOPHIL # BLD AUTO: 0 K/UL (ref 0–0.5)
EOSINOPHIL NFR BLD: 0.7 % (ref 0–8)
ERYTHROCYTE [DISTWIDTH] IN BLOOD BY AUTOMATED COUNT: 13.9 % (ref 11.5–14.5)
EST. GFR  (AFRICAN AMERICAN): 8 ML/MIN/1.73 M^2
EST. GFR  (NON AFRICAN AMERICAN): 7 ML/MIN/1.73 M^2
FINAL PATHOLOGIC DIAGNOSIS: NORMAL
GLUCOSE SERPL-MCNC: 140 MG/DL (ref 70–110)
GROSS: NORMAL
HCT VFR BLD AUTO: 24.8 % (ref 40–54)
HGB BLD-MCNC: 8.5 G/DL (ref 14–18)
IMM GRANULOCYTES # BLD AUTO: 0.14 K/UL (ref 0–0.04)
IMM GRANULOCYTES NFR BLD AUTO: 2.4 % (ref 0–0.5)
LYMPHOCYTES # BLD AUTO: 0.6 K/UL (ref 1–4.8)
LYMPHOCYTES NFR BLD: 9.5 % (ref 18–48)
MCH RBC QN AUTO: 25.6 PG (ref 27–31)
MCHC RBC AUTO-ENTMCNC: 34.3 G/DL (ref 32–36)
MCV RBC AUTO: 75 FL (ref 82–98)
MONOCYTES # BLD AUTO: 0.6 K/UL (ref 0.3–1)
MONOCYTES NFR BLD: 9.8 % (ref 4–15)
NEUTROPHILS # BLD AUTO: 4.5 K/UL (ref 1.8–7.7)
NEUTROPHILS NFR BLD: 77.1 % (ref 38–73)
NRBC BLD-RTO: 0 /100 WBC
PATHOLOGIST INTERPRETATION IFE: NORMAL
PLATELET # BLD AUTO: 199 K/UL (ref 150–350)
PMV BLD AUTO: 9.8 FL (ref 9.2–12.9)
POCT GLUCOSE: 150 MG/DL (ref 70–110)
POCT GLUCOSE: 174 MG/DL (ref 70–110)
POCT GLUCOSE: 194 MG/DL (ref 70–110)
POTASSIUM SERPL-SCNC: 6.5 MMOL/L (ref 3.5–5.1)
PROT SERPL-MCNC: 6 G/DL (ref 6–8.4)
RBC # BLD AUTO: 3.32 M/UL (ref 4.6–6.2)
SODIUM SERPL-SCNC: 133 MMOL/L (ref 136–145)
WBC # BLD AUTO: 5.81 K/UL (ref 3.9–12.7)

## 2020-08-20 PROCEDURE — 63600175 PHARM REV CODE 636 W HCPCS: Performed by: RADIOLOGY

## 2020-08-20 PROCEDURE — 21400001 HC TELEMETRY ROOM

## 2020-08-20 PROCEDURE — 80100014 HC HEMODIALYSIS 1:1

## 2020-08-20 PROCEDURE — 97530 THERAPEUTIC ACTIVITIES: CPT

## 2020-08-20 PROCEDURE — 63600175 PHARM REV CODE 636 W HCPCS: Performed by: INTERNAL MEDICINE

## 2020-08-20 PROCEDURE — 25000003 PHARM REV CODE 250: Performed by: HOSPITALIST

## 2020-08-20 PROCEDURE — 25000003 PHARM REV CODE 250: Performed by: INTERNAL MEDICINE

## 2020-08-20 PROCEDURE — 36415 COLL VENOUS BLD VENIPUNCTURE: CPT

## 2020-08-20 PROCEDURE — 80074 ACUTE HEPATITIS PANEL: CPT

## 2020-08-20 PROCEDURE — 85025 COMPLETE CBC W/AUTO DIFF WBC: CPT

## 2020-08-20 PROCEDURE — 86706 HEP B SURFACE ANTIBODY: CPT

## 2020-08-20 PROCEDURE — 80053 COMPREHEN METABOLIC PANEL: CPT

## 2020-08-20 RX ORDER — MUPIROCIN 20 MG/G
OINTMENT TOPICAL 2 TIMES DAILY
Status: DISCONTINUED | OUTPATIENT
Start: 2020-08-20 | End: 2020-08-25

## 2020-08-20 RX ORDER — HEPARIN SODIUM 5000 [USP'U]/ML
5000 INJECTION, SOLUTION INTRAVENOUS; SUBCUTANEOUS
Status: COMPLETED | OUTPATIENT
Start: 2020-08-20 | End: 2020-08-26

## 2020-08-20 RX ORDER — HYDRALAZINE HYDROCHLORIDE 25 MG/1
25 TABLET, FILM COATED ORAL EVERY 8 HOURS
Status: DISCONTINUED | OUTPATIENT
Start: 2020-08-20 | End: 2020-09-01 | Stop reason: HOSPADM

## 2020-08-20 RX ORDER — HEPARIN SODIUM 5000 [USP'U]/ML
10000 INJECTION, SOLUTION INTRAVENOUS; SUBCUTANEOUS ONCE
Status: COMPLETED | OUTPATIENT
Start: 2020-08-20 | End: 2020-08-20

## 2020-08-20 RX ADMIN — LABETALOL HYDROCHLORIDE 200 MG: 100 TABLET, FILM COATED ORAL at 09:08

## 2020-08-20 RX ADMIN — CALCIUM ACETATE 667 MG: 667 CAPSULE ORAL at 04:08

## 2020-08-20 RX ADMIN — MUPIROCIN: 20 OINTMENT TOPICAL at 09:08

## 2020-08-20 RX ADMIN — HYDRALAZINE HYDROCHLORIDE 25 MG: 25 TABLET, FILM COATED ORAL at 10:08

## 2020-08-20 RX ADMIN — CALCITRIOL CAPSULES 0.25 MCG 0.25 MCG: 0.25 CAPSULE ORAL at 08:08

## 2020-08-20 RX ADMIN — HEPARIN SODIUM 10000 UNITS: 5000 INJECTION, SOLUTION INTRAVENOUS; SUBCUTANEOUS at 11:08

## 2020-08-20 RX ADMIN — PHENYTOIN SODIUM 100 MG: 100 CAPSULE ORAL at 08:08

## 2020-08-20 RX ADMIN — FUROSEMIDE 40 MG: 10 INJECTION, SOLUTION INTRAVENOUS at 04:08

## 2020-08-20 RX ADMIN — CLOPIDOGREL 75 MG: 75 TABLET, FILM COATED ORAL at 08:08

## 2020-08-20 RX ADMIN — LABETALOL HYDROCHLORIDE 200 MG: 100 TABLET, FILM COATED ORAL at 08:08

## 2020-08-20 RX ADMIN — MAGNESIUM OXIDE TAB 400 MG (241.3 MG ELEMENTAL MG) 400 MG: 400 (241.3 MG) TAB at 09:08

## 2020-08-20 RX ADMIN — ATORVASTATIN CALCIUM 80 MG: 40 TABLET, FILM COATED ORAL at 08:08

## 2020-08-20 RX ADMIN — HEPARIN SODIUM 5000 UNITS: 5000 INJECTION INTRAVENOUS; SUBCUTANEOUS at 03:08

## 2020-08-20 RX ADMIN — MAGNESIUM OXIDE TAB 400 MG (241.3 MG ELEMENTAL MG) 400 MG: 400 (241.3 MG) TAB at 08:08

## 2020-08-20 RX ADMIN — MUPIROCIN: 20 OINTMENT TOPICAL at 04:08

## 2020-08-20 RX ADMIN — ACETAMINOPHEN 650 MG: 325 TABLET ORAL at 05:08

## 2020-08-20 RX ADMIN — TAMSULOSIN HYDROCHLORIDE 0.8 MG: 0.4 CAPSULE ORAL at 08:08

## 2020-08-20 RX ADMIN — CALCIUM ACETATE 667 MG: 667 CAPSULE ORAL at 08:08

## 2020-08-20 RX ADMIN — PHENYTOIN SODIUM 100 MG: 100 CAPSULE ORAL at 09:08

## 2020-08-20 RX ADMIN — HYDRALAZINE HYDROCHLORIDE 50 MG: 25 TABLET, FILM COATED ORAL at 05:08

## 2020-08-20 NOTE — PROGRESS NOTES
Awake alert oriented NAD    We talked about his renal failure causes and possible treatments. He opted to proceed with HD rather than not to dialyze. Options and alternatives given to the patient he chose HD. Indication nd possible complications and side effects explained to satisfaction. All questions answered.     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 Tay    CATARACT EXTRACTION W/  INTRAOCULAR LENS IMPLANT Left 10/19/2017    Dr. Tay    ESOPHAGOGASTRODUODENOSCOPY N/A 8/17/2020    Procedure: EGD (ESOPHAGOGASTRODUODENOSCOPY);  Surgeon: Desmond Chapman MD;  Location: [facility];  Service: Endoscopy;  Laterality: N/A;    FEMORAL ARTERY STENT      KNEE SURGERY      bilateral scope     Review of patient's allergies indicates:   Allergen Reactions    Ace inhibitors      Hyperkalemia 8/2018    Penicillins Hives    Tizanidine      Felt hot       Current Facility-Administered Medications   Medication    acetaminophen tablet 650 mg    atorvastatin tablet 80 mg    calcitRIOL capsule 0.25 mcg    calcium acetate(phosphat bind) capsule 667 mg    clopidogreL tablet 75 mg    dextrose 50% injection 12.5 g    dextrose 50% injection 25 g    furosemide injection 40 mg    glucagon (human recombinant) injection 1 mg    glucose chewable tablet 16 g    glucose chewable tablet 24 g    hydrALAZINE injection 10 mg    hydrALAZINE tablet 25 mg    insulin aspart U-100 pen 1-10 Units    labetaloL tablet 200 mg    magnesium oxide tablet 400 mg    melatonin tablet 6 mg    metoprolol injection 5 mg    phenytoin (DILANTIN) ER capsule 100 mg    sodium chloride 0.9% flush 10 mL    sodium chloride 0.9% flush  10 mL    tamsulosin 24 hr capsule 0.8 mg    traZODone tablet 50 mg       LABS    Recent Results (from the past 24 hour(s))   POCT glucose    Collection Time: 08/19/20 12:40 PM   Result Value Ref Range    POCT Glucose 200 (H) 70 - 110 mg/dL   POCT glucose    Collection Time: 08/19/20  3:26 PM   Result Value Ref Range    POCT Glucose 136 (H) 70 - 110 mg/dL   POCT glucose    Collection Time: 08/19/20  8:09 PM   Result Value Ref Range    POCT Glucose 147 (H) 70 - 110 mg/dL   Comprehensive metabolic panel    Collection Time: 08/20/20  4:57 AM   Result Value Ref Range    Sodium 133 (L) 136 - 145 mmol/L    Potassium 6.5 (HH) 3.5 - 5.1 mmol/L    Chloride 104 95 - 110 mmol/L    CO2 17 (L) 23 - 29 mmol/L    Glucose 140 (H) 70 - 110 mg/dL    BUN, Bld 87 (H) 8 - 23 mg/dL    Creatinine 7.5 (H) 0.5 - 1.4 mg/dL    Calcium 6.4 (LL) 8.7 - 10.5 mg/dL    Total Protein 6.0 6.0 - 8.4 g/dL    Albumin 2.6 (L) 3.5 - 5.2 g/dL    Total Bilirubin 0.2 0.1 - 1.0 mg/dL    Alkaline Phosphatase 102 55 - 135 U/L    AST 24 10 - 40 U/L    ALT 18 10 - 44 U/L    Anion Gap 12 8 - 16 mmol/L    eGFR if African American 8 (A) >60 mL/min/1.73 m^2    eGFR if non African American 7 (A) >60 mL/min/1.73 m^2   CBC auto differential    Collection Time: 08/20/20  4:57 AM   Result Value Ref Range    WBC 5.81 3.90 - 12.70 K/uL    RBC 3.32 (L) 4.60 - 6.20 M/uL    Hemoglobin 8.5 (L) 14.0 - 18.0 g/dL    Hematocrit 24.8 (L) 40.0 - 54.0 %    Mean Corpuscular Volume 75 (L) 82 - 98 fL    Mean Corpuscular Hemoglobin 25.6 (L) 27.0 - 31.0 pg    Mean Corpuscular Hemoglobin Conc 34.3 32.0 - 36.0 g/dL    RDW 13.9 11.5 - 14.5 %    Platelets 199 150 - 350 K/uL    MPV 9.8 9.2 - 12.9 fL    Immature Granulocytes 2.4 (H) 0.0 - 0.5 %    Gran # (ANC) 4.5 1.8 - 7.7 K/uL    Immature Grans (Abs) 0.14 (H) 0.00 - 0.04 K/uL    Lymph # 0.6 (L) 1.0 - 4.8 K/uL    Mono # 0.6 0.3 - 1.0 K/uL    Eos # 0.0 0.0 - 0.5 K/uL    Baso # 0.03 0.00 - 0.20 K/uL    nRBC 0 0 /100 WBC    Gran% 77.1 (H) 38.0 -  73.0 %    Lymph% 9.5 (L) 18.0 - 48.0 %    Mono% 9.8 4.0 - 15.0 %    Eosinophil% 0.7 0.0 - 8.0 %    Basophil% 0.5 0.0 - 1.9 %    Differential Method Automated    POCT glucose    Collection Time: 08/20/20  8:29 AM   Result Value Ref Range    POCT Glucose 174 (H) 70 - 110 mg/dL   POCT glucose    Collection Time: 08/20/20  8:59 AM   Result Value Ref Range    POCT Glucose 194 (H) 70 - 110 mg/dL   ]    I/O last 3 completed shifts:  In: 600 [P.O.:600]  Out: 250 [Urine:250]    Vitals:    08/19/20 2346 08/20/20 0459 08/20/20 0822 08/20/20 0900   BP: 134/60 (!) 143/66 (!) 118/57 (!) 114/58   Pulse: 82 81 82 80   Resp: 18 18 18 18   Temp: 98.1 °F (36.7 °C) 98.6 °F (37 °C) 97.5 °F (36.4 °C) 98 °F (36.7 °C)   TempSrc: Oral Axillary Oral Oral   SpO2: 99% 96% 99% 98%   Weight:       Height:           No Jvd, Thyromegaly or Lymphadenopathy  Lungs: Fairly clear anteriorly and laterally  Cor: RRR no G or rubs  Abd: Soft benign good bowel sounds non tender  Ext: Pos edema    A)    Nephrotic snd (with smallish renal shadows on US), likely diabetic in nature. We talked about possible HD but pt is completely asx without signs of uremia. ( studies does not support early hd)  Anemia  BPH (co inability to void better)  CKD4 creat continue to rise (not a good sign)  Hyperpth on binders and vitd analogs  DM  HTN  Obesity  Hyperpth     P)  Renal Diet  Home meds  Protect L arm for future access  EPO might be needed  Binders  Adjust all meds to the degree of renal fx  Close follow up I/O and weights  Maintain Hydration   Dialysis today

## 2020-08-20 NOTE — PROGRESS NOTES
08/20/20 1540   Post-Hemodialysis Assessment   Blood Volume Processed (Liters) 61 L   Dialyzer Clearance Moderately streaked   Duration of Treatment (minutes) 240 minutes   Hemodialysis Intake (mL) 500 mL   Total UF (mL) 1500 mL   Net Fluid Removal 1000   Patient Response to Treatment tolerated well     Report given to bedside RN, nadn and vss.

## 2020-08-20 NOTE — PT/OT/SLP PROGRESS
Occupational Therapy   Treatment    Name: Miguel Moore  MRN: [id number]  Admitting Diagnosis:  Nephrotic syndrome  3 Days Post-Op    Recommendations:     Discharge Recommendations: nursing facility, skilled  Discharge Equipment Recommendations:  bath bench, bedside commode  Barriers to discharge:  (decreased mobility; increased assist required for all aspects of care; high risk for falls, unplanned readmission, and morbidity if d/c home)    Assessment:     Miguel Moore is a 65 y.o. male with a medical diagnosis of Nephrotic syndrome. Performance deficits affecting function are weakness, impaired endurance, decreased ROM, decreased coordination, impaired self care skills, decreased upper extremity function, impaired fine motor, impaired functional mobilty, decreased lower extremity function, gait instability, decreased safety awareness, impaired balance.     Pt's Ms. Tracey neri, present and reaffirmed pt's PLOF: MOD I PTA with ADLs and functional mobility. Now, pt is very deconditioned and required Total A to sit EOB today; MOD A for sit>supine. Pt will benefit from SNF at d/c in order to address functional deficits and regain PLOF.     Rehab Prognosis:  Good; patient would benefit from acute skilled OT services to address these deficits and reach maximum level of function.       Plan:     Patient to be seen (5-6x/wk) to address the above listed problems via self-care/home management, therapeutic activities, therapeutic exercises, neuromuscular re-education  · Plan of Care Expires: 09/02/20  · Plan of Care Reviewed with: patient, significant other    Subjective     Chief complaint: weak  Patient's comments/goals: agreeable to sit EOB     Pain/Comfort:  · Pain Rating 1: (c/o discomfort all over)  · Pain Addressed 1: Reposition    Objective:     Communicated with: nurseMary, prior to session.  Patient found HOB elevated with bed alarm, peripheral IV, telemetry(Avasys) upon OT entry to room.    General  Precautions: Standard, fall, diabetic, seizure   Orthopedic Precautions:N/A   Braces: N/A     Occupational Performance:     Bed Mobility:  OT encouraged pt to roll to the R in order to use LUE and LLE to assist more with sup>sit, but declined d/t R knee pain.  · Patient completed Rolling/Turning to Right with maximal assistance  · Patient completed Scooting anteriorly with minimum assistance and B/L LE blocked; patient scooted laterally at EOB to HOB with minimal assistance   · Patient completed Supine to Sit with total assistance and HOB elevated     Functional Mobility/Transfers:  · Functional Mobility: NT; transport arrived to take pt by bed.     Activities of Daily Living:  · NT      [facility] 6 Click ADL: 13    Treatment & Education:  · Pt re-educated on OT role/POC.   · Importance of EOB activity with therapy.  · Safety during bed mobility   · White board updated: OT adjusted progressive mobility to level 1 and notified nurse   · Pt RUE position entails flexed digits I-V, wrist flexed, forearm pronated and internally rotated, and shoulder elevated  · PROM to digits I-V; pt only able to extend digits I, II, and IV  · Gentle PROM to forearm in supination with very limited range  · Pt able to abduct shoulder actively minimally; no shoulder flexion PROM d/t rigidity   · Pt reports compliance with SO cutting his fingernails to protect skin integrity in his palm   · All questions/concerns answered within OT scope of practice       Patient left HOB elevated with all lines intact, call button in reach, nurseMary, notified and transport presentEducation:      GOALS:   Multidisciplinary Problems     Occupational Therapy Goals        Problem: Occupational Therapy Goal    Goal Priority Disciplines Outcome Interventions   Occupational Therapy Goal     OT, PT/OT Ongoing, Progressing    Description: Goals to be met by: 09/02/20     Patient will increase functional independence with ADLs by performing:    Feeding with  Modified [place].  UE Dressing with Modified [place].  LE Dressing with Minimal Assistance.  Grooming while seated with Modified [place].  Toileting from bedside commode with Stand-by Assistance for hygiene and clothing management.   Rolling to Bilateral with Supervision.   Supine to sit with Supervision.  Step transfer with Stand-by Assistance  Stand pivot transfer with Stand-by Assistance  Bed>wheelchair transfer with Stand-by assistance   Pt will manually propel w/c household distance with MOD I.   Toilet transfer to bedside commode with Stand-by Assistance.  Upper extremity exercise program x15 reps per handout, with independence.                     Time Tracking:     OT Date of Treatment: 08/20/20  OT Start Time: 1017  OT Stop Time: 1031  OT Total Time (min): 14 min    Billable Minutes:Therapeutic Activity 14 min    Minerva Pleitez OT  8/20/2020

## 2020-08-20 NOTE — ASSESSMENT & PLAN NOTE
Patient with profound edema - anasarca up to abdomen - 2-3+ pitting after overnight diuresis + 3+ proteinuria - CKD stage 4 worsened from creatine baseline up to creatine of around 3-->5.8, severe activity intolerance - Nephrology consulted and following - diureses, strict I&Os, daily weight  -stop amlodipine - added labetalol 200 mg per nephrology.  Also started on Hydralazine.  Increasing Creat with persistent hyperkalemia.  IR consult for HD catheter.  Probably HD afterwards.

## 2020-08-20 NOTE — PROGRESS NOTES
1050: Consent obtained per MD and verified per RN. Pt wheeled into IR procedure room and assisted to fluoro procedure table. Attached to vitals monitor. VSS. NADN. Prepped in sterile fashion per RT.   1100: MD notified of patient and team's readiness to begin.   1104: MD in room.   1105: Time out completed.   1107: Lidocaine administered per MD to right neck. Tolerated well.   1108: Access obtained into R IJ. Tolerated well. Remains NSR on monitor. Guidewire advanced into vessel under fluoro guidance with no arrhythmias noted.   1112: Catheter advanced under fluoro per MD into vessel. Tolerated well with no arrhythmias noted on monitor.   1113: Guidewire removed. Placement verified under fluoro.   1114: Catheter sutured in place per MD. Tolerated well. Site dressed with biopatch, gauze and tegaderm, CDI, no redness, swelling or hematoma noted.   1115: Both ports heparin locked. Report called to Leanna dialysis RN and DARRION Hernandez. Pt to go straight to dialysis.

## 2020-08-20 NOTE — ASSESSMENT & PLAN NOTE
Uncontrolled.  Norvasc stopped with lower extremity edema.  Started on Hydralazine.  Increase as needed.  Improving.

## 2020-08-20 NOTE — PROGRESS NOTES
[facility] Hospital Medicine  Progress Note    Patient Name: Miguel Moore  MRN: [id number]  Patient Class: IP- Inpatient   Admission Date: 8/15/2020  Length of Stay: 4 days  Attending Physician: Barrie Negro MD  Primary Care Provider: Raciel Raymond MD        Subjective:     Principal Problem:Nephrotic syndrome        HPI:  65 y.o. 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  States he was called on 8/13/2020, but did not come to the ED until today.  He reports mild edema and weight gain of 1-2 lb.  Denies fever, chills, cough, SOB, chest pain, palpitations, orthopnea, PND, dizziness, syncope, n/v/d, abd pain, or dysuria.  In the ED, Cr/BUN 5.3/64, K+ 5.4, H/H 7.5/22.7, CPK 1262, , UA shows proteinura 3+, renal US suggest chronic medical renal disease.  His Nephrologist is Dr. Roa, last visit Oct 2019.  Nephrology consult.  Placed in observation.    Overview/Hospital Course:  65 y.o. AAM with history significant for DMII, HTN, stroke, and CKD4 presents for evaluation of worsening CKD sent in by provider. Creatine = 2.0 1 year ago now with creatine 5.4 and potassium 5.8 worsening swelling and anasarca 2-3+ edema up to abdomen and flank, activity intolerance and orthopnea X 5 pillows. He is not on hemodialysis.  Amlodipine stopped.  Worsening BP and started on Hydralazine.  Nephrology consulted.  BP improved, but worsening Creat with persistent hyperkalemia.  Patient will need HD.  IR consulted for HD catheter.    Interval History: No new complaints.    Review of Systems   HENT: Negative for ear discharge and ear pain.    Eyes: Negative for discharge and itching.   Endocrine: Negative for cold intolerance and heat intolerance.   Neurological: Negative for seizures and syncope.      Objective:     Vital Signs (Most Recent):  Temp: 98 °F (36.7 °C) (08/20/20 0900)  Pulse: 80 (08/20/20 0900)  Resp: 18 (08/20/20 0900)  BP: (!) 114/58 (08/20/20 0900)  SpO2: 98 % (08/20/20 0900) Vital Signs (24h Range):  Temp:  [97.5 °F (36.4 °C)-98.6 °F (37 °C)] 98 °F (36.7 °C)  Pulse:  [75-82] 80  Resp:  [16-18] 18  SpO2:  [96 %-100 %] 98 %  BP: (114-143)/(57-66) 114/58     Weight: 98 kg (216 lb 0.8 oz)  Body mass index is 32.85 kg/m².    Intake/Output Summary (Last 24 hours) at 8/20/2020 1001  Last data filed at 8/20/2020 0500  Gross per 24 hour   Intake 600 ml   Output 250 ml   Net 350 ml      Physical Exam  Vitals signs and nursing note reviewed.   Constitutional:       General: He is not in acute distress.     Appearance: He is well-developed.   HENT:      Head: Normocephalic and atraumatic.      Right Ear: External ear normal.      Left Ear: External ear normal.      Nose: Nose normal.   Eyes:      Conjunctiva/sclera: Conjunctivae normal.      Pupils: Pupils are equal, round, and reactive to light.   Neck:      Musculoskeletal: Normal range of motion and neck supple.   Cardiovascular:      Rate and Rhythm: Normal rate and regular rhythm.   Pulmonary:      Effort: Pulmonary effort is normal. No respiratory distress.      Breath sounds: Rales present. No wheezing.   Abdominal:      General: Bowel sounds are normal. There is no distension.      Palpations: Abdomen is soft.      Tenderness: There is no abdominal tenderness.      Comments: No palpable hepatomegaly or splenomegaly   Musculoskeletal:         General: Swelling present. No tenderness.      Right lower leg: Edema present.      Left lower leg: Edema present.      Comments: Activity intolerance   Skin:     General: Skin is warm and dry.      Comments: Swelling and pitting edema up to abdomen and flank   Neurological:      Mental Status: He is alert and oriented to person, place, and time.      Motor: Weakness present.   Psychiatric:         Thought  Content: Thought content normal.         Significant Labs:   BMP:   Recent Labs   Lab 08/20/20  0457   *   *   K 6.5*      CO2 17*   BUN 87*   CREATININE 7.5*   CALCIUM 6.4*     CBC:   Recent Labs   Lab 08/20/20  0457   WBC 5.81   HGB 8.5*   HCT 24.8*          Significant Imaging: I have reviewed all pertinent imaging results/findings within the past 24 hours.      Assessment/Plan:      * Nephrotic syndrome  Patient with profound edema - anasarca up to abdomen - 2-3+ pitting after overnight diuresis + 3+ proteinuria - CKD stage 4 worsened from creatine baseline up to creatine of around 3-->5.8, severe activity intolerance - Nephrology consulted and following - diureses, strict I&Os, daily weight  -stop amlodipine - added labetalol 200 mg per nephrology.  Also started on Hydralazine.  Increasing Creat with persistent hyperkalemia.  IR consult for HD catheter.  Probably HD afterwards.    Hyperkalemia  Probable HD today.      Anemia  Anemia of CKD  H/H stable since admission with no evidence of bleeding.        Enlarged prostate  Continue home flomax - voiding well - strict I&Os      Hypocalcemia  Calcium = 6 corrected = 7.2 - nephrology following calcitrol 0.25 mcg and calcium acetate 667 po tid AC    Anasarca  See above      Non-traumatic rhabdomyolysis  CPK has remained around 1200    Type 2 diabetes mellitus with diabetic neuropathy, with long-term current use of insulin        CKD (chronic kidney disease) stage 4, GFR 15-29 ml/min with proteinuria  Acute on chronic CKD stage 4  Appreciate Nephrology input.  As above.    Hemiplegia and hemiparesis following cerebral infarction affecting right dominant side  Stable, no acute issue, continue plavix/statin.  PT/OT evaluation.    Seizure disorder as sequela of cerebrovascular accident  Continue home regimen of phenytoin    Benign non-nodular prostatic hyperplasia without lower urinary tract symptoms  Continue home regimen of tamsulosin      Controlled type 2 diabetes mellitus with stage 4 chronic kidney disease, with long-term current use of insulin  Last HgbA1c   Lab Results   Component Value Date    HGBA1C 5.5 08/13/2020     Hold oral antihyperglycemics while inpatient  PRN sliding scale insulin  ACHS glucose monitoring   ADA diet     Pure hypercholesterolemia  Continue statin    Benign essential HTN; ACEI hyperK  Uncontrolled.  Norvasc stopped with lower extremity edema.  Started on Hydralazine.  Increase as needed.  Improving.      VTE Risk Mitigation (From admission, onward)         Ordered     IP VTE HIGH RISK PATIENT  Once      08/15/20 1505     Place sequential compression device  Until discontinued      08/15/20 1505                Discharge Planning   GARY:      Code Status: Full Code   Is the patient medically ready for discharge?:     Reason for patient still in hospital (select all that apply): Patient unstable  Discharge Plan A: Skilled Nursing Facility   Discharge Delays: None known at this time              Barrie Negro MD  Department of Hospital Medicine   [facility]

## 2020-08-20 NOTE — SUBJECTIVE & OBJECTIVE
Interval History: No new complaints.    Review of Systems   HENT: Negative for ear discharge and ear pain.    Eyes: Negative for discharge and itching.   Endocrine: Negative for cold intolerance and heat intolerance.   Neurological: Negative for seizures and syncope.     Objective:     Vital Signs (Most Recent):  Temp: 98 °F (36.7 °C) (08/20/20 0900)  Pulse: 80 (08/20/20 0900)  Resp: 18 (08/20/20 0900)  BP: (!) 114/58 (08/20/20 0900)  SpO2: 98 % (08/20/20 0900) Vital Signs (24h Range):  Temp:  [97.5 °F (36.4 °C)-98.6 °F (37 °C)] 98 °F (36.7 °C)  Pulse:  [75-82] 80  Resp:  [16-18] 18  SpO2:  [96 %-100 %] 98 %  BP: (114-143)/(57-66) 114/58     Weight: 98 kg (216 lb 0.8 oz)  Body mass index is 32.85 kg/m².    Intake/Output Summary (Last 24 hours) at 8/20/2020 1001  Last data filed at 8/20/2020 0500  Gross per 24 hour   Intake 600 ml   Output 250 ml   Net 350 ml      Physical Exam  Vitals signs and nursing note reviewed.   Constitutional:       General: He is not in acute distress.     Appearance: He is well-developed.   HENT:      Head: Normocephalic and atraumatic.      Right Ear: External ear normal.      Left Ear: External ear normal.      Nose: Nose normal.   Eyes:      Conjunctiva/sclera: Conjunctivae normal.      Pupils: Pupils are equal, round, and reactive to light.   Neck:      Musculoskeletal: Normal range of motion and neck supple.   Cardiovascular:      Rate and Rhythm: Normal rate and regular rhythm.   Pulmonary:      Effort: Pulmonary effort is normal. No respiratory distress.      Breath sounds: Rales present. No wheezing.   Abdominal:      General: Bowel sounds are normal. There is no distension.      Palpations: Abdomen is soft.      Tenderness: There is no abdominal tenderness.      Comments: No palpable hepatomegaly or splenomegaly   Musculoskeletal:         General: Swelling present. No tenderness.      Right lower leg: Edema present.      Left lower leg: Edema present.      Comments: Activity  intolerance   Skin:     General: Skin is warm and dry.      Comments: Swelling and pitting edema up to abdomen and flank   Neurological:      Mental Status: He is alert and oriented to person, place, and time.      Motor: Weakness present.   Psychiatric:         Thought Content: Thought content normal.         Significant Labs:   BMP:   Recent Labs   Lab 08/20/20 0457   *   *   K 6.5*      CO2 17*   BUN 87*   CREATININE 7.5*   CALCIUM 6.4*     CBC:   Recent Labs   Lab 08/20/20 0457   WBC 5.81   HGB 8.5*   HCT 24.8*          Significant Imaging: I have reviewed all pertinent imaging results/findings within the past 24 hours.

## 2020-08-20 NOTE — PLAN OF CARE
08/20/20 0907   Post-Acute Status   Post-Acute Authorization Placement  (SNF)   Post-Acute Placement Status Referrals Sent   Discharge Delays None known at this time   Discharge Plan   Discharge Plan A Skilled Nursing Facility

## 2020-08-20 NOTE — PLAN OF CARE
Problem: Adult Inpatient Plan of Care  Goal: Plan of Care Review  Outcome: Ongoing, Progressing   Patient remained free of injury throughout the shift. Vital signs remained WNL. No complaints of pain or signs of respiratory distress noted. Plan to continue diuresing patient with Lasix, continue dialysis. Patient updated on plan of care and verbalized  understanding. Call light in reach and patient instructed to inform the nurse if anything is needed. Patient stable and will continue to be monitored.

## 2020-08-20 NOTE — CONSULTS
Inpatient Radiology Pre-procedure Note    History of Present Illness:  Miguel Moore is a 65 y.o. male with pertinent PMHx of DM2, HTN, stage IV CKD (Cr/GFR = 2.3/33.4) and nephrotic syndrome now with continued worsening Cr/GFR (currently 7.5/8.0), symptoms of nephrotic syndrome and hyperkalemia that has been difficult to optimally manage medically requiring central venous access for HD and correction of hyperkalemia.     Of note, pt is on MONE and right-handed with need to protect LUE for future access.     A new inpatient IR consult received for current placement of a RIJV-approach NTHDC for urgent HD and correction of hyperkalemia with plan to subsequently convert to THDC.     Admission H&P reviewed.  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 Tay    CATARACT EXTRACTION W/  INTRAOCULAR LENS IMPLANT Left 10/19/2017    Dr. Tay    ESOPHAGOGASTRODUODENOSCOPY N/A 8/17/2020    Procedure: EGD (ESOPHAGOGASTRODUODENOSCOPY);  Surgeon: Desmond Chapman MD;  Location: [facility];  Service: Endoscopy;  Laterality: N/A;    FEMORAL ARTERY STENT      KNEE SURGERY      bilateral scope     Review of Systems:   As documented in primary team H&P    Home Meds:   Prior to Admission medications    Medication Sig Start Date End Date Taking? Authorizing Provider   amLODIPine (NORVASC) 10 MG tablet Take 1 tablet (10 mg total) by mouth once daily. 7/31/20 7/31/21 Yes Raciel Raymond MD   atorvastatin (LIPITOR) 80 MG tablet Take 1 tablet (80 mg total) by mouth once daily. 7/31/20 7/31/21 Yes Raciel Raymond MD   baclofen (LIORESAL) 10 MG tablet Take 1 tablet (10 mg total) by mouth 4 (four) times daily. 7/31/20 7/31/21 Yes Raciel Raymond MD   cholecalciferol,  "vitamin D3, (VITAMIN D3) 1,000 unit capsule Take 1 capsule (1,000 Units total) by mouth once daily. 1/4/19  Yes Raciel Raymond MD   clopidogreL (PLAVIX) 75 mg tablet Take 1 tablet (75 mg total) by mouth once daily. 7/31/20  Yes Raciel Raymond MD   furosemide (LASIX) 20 MG tablet Take 1 tablet (20 mg total) by mouth once daily. 7/31/20  Yes Raciel Raymond MD   insulin detemir U-100 (LEVEMIR FLEXTOUCH U-100 INSULN) 100 unit/mL (3 mL) InPn pen Inject 16 Units into the skin every evening. STOP NOVOLOG 8/13/20 8/13/21 Yes RENZO Jordan   tamsulosin (FLOMAX) 0.4 mg Cap Take 2 capsules (0.8 mg total) by mouth once daily. 7/31/20  Yes Raciel Raymond MD   blood-glucose meter Misc 1 each by Misc.(Non-Drug; Combo Route) route once daily. Pharmacy-whichever brand specified by insurance 5/11/18 9/9/19  Raciel Raymond MD   furosemide (LASIX) 20 MG tablet Take two tablets twice a day 8/13/20   RENZO Jordan   lancets (ONETOUCH DELICA LANCETS) 33 gauge Misc 1 lancet by Misc.(Non-Drug; Combo Route) route once daily. ONCE DAILY BLOOD SUGAR TESTING; [employer] BRAND COVERED BY INSURANCE 5/11/18 9/9/19  Raciel Raymond MD   pen needle, diabetic 31 gauge x 1/4" Ndle For mealtime insulin  Patient not taking: Reported on 8/13/2020 5/11/18   Raciel Raymond MD   phenytoin (DILANTIN) 100 MG ER capsule Take 1 capsule (100 mg total) by mouth 3 (three) times daily. 7/31/20   Raciel Raymond MD     Scheduled Meds:    atorvastatin  80 mg Oral Daily    calcitRIOL  0.25 mcg Oral Daily    calcium acetate(phosphat bind)  667 mg Oral TID WM    clopidogreL  75 mg Oral Daily    furosemide (LASIX) IV  40 mg Intravenous Q12H    hydrALAZINE  50 mg Oral Q8H    labetaloL  200 mg Oral Q12H    magnesium oxide  400 mg Oral BID    phenytoin  100 mg Oral TID    tamsulosin  2 capsule Oral Daily     Continuous Infusions:   PRN Meds:acetaminophen, dextrose 50%, dextrose 50%, glucagon (human recombinant), glucose, glucose, " hydrALAZINE, insulin aspart U-100, melatonin, metoprolol, sodium chloride 0.9%, sodium chloride 0.9%, trazodone     Anticoagulants/Antiplatelets: Plavix    Allergies:   Review of patient's allergies indicates:   Allergen Reactions    Ace inhibitors      Hyperkalemia 8/2018    Penicillins Hives    Tizanidine      Felt hot     Sedation Hx: have not been any systemic reactions    Labs:  No results for input(s): INR in the last 168 hours.    Invalid input(s):  PT,  PTT    Recent Labs   Lab 08/20/20 0457   WBC 5.81   HGB 8.5*   HCT 24.8*   MCV 75*         Recent Labs   Lab 08/16/20  0939  08/20/20 0457   GLU  --    < > 140*   NA  --    < > 133*   K  --    < > 6.5*   CL  --    < > 104   CO2  --    < > 17*   BUN  --    < > 87*   CREATININE  --    < > 7.5*   CALCIUM  --    < > 6.4*   MG 1.4*  --   --    ALT  --    < > 18   AST  --    < > 24   ALBUMIN  --    < > 2.6*   BILITOT  --    < > 0.2    < > = values in this interval not displayed.     Physical Exam:  ASA: III  Mallampati: II    General: no acute distress  Mental Status: alert and oriented to person, place and time  HEENT: normocephalic, atraumatic  Chest: unlabored breathing  Heart: regular heart rate  Abdomen: nondistended  Extremity: moves all extremities    Vitals:  Temp: 98 °F (36.7 °C) (08/20/20 0900)  Pulse: 80 (08/20/20 0900)  Resp: 18 (08/20/20 0900)  BP: (!) 114/58 (08/20/20 0900)  SpO2: 98 % (08/20/20 0900)     A/P:  65 y.o. male with stage IV CKD (Cr/GFR = 2.3/33.4) and nephrotic syndrome now with continued worsening Cr/GFR (currently 7.5/8.0), symptoms of nephrotic syndrome and hyperkalemia that has been difficult to optimally manage medically requiring central venous access for HD and correction of hyperkalemia. Of note, pt is on MONE and right-handed with need to protect LUE for future access.     1. Stage IV CKD, nephrotic syndrome and hyperkalemia - Will attempt placement of a RIJV-approach NTHDC for urgent HD and correction of hyperkalemia  with local anesthetic only this afternoon with tentative plan to subsequently convert to RIJV-approach THDC on AM of 8/21/20 under moderate conscious sedation assuming appropriate correction of hyperkalemia with tentative HD later today.    Risks (including, but not limited to, pain, bleeding, infection, damage to nearby structures, failure to obtain sufficient material for a diagnosis, the need for additional procedures, and death), benefits, and alternatives were discussed with the patient. All questions were answered to the best of my abilities. The patient wishes to proceed with the procedure. Written informed consent was obtained.    Thank you for considering IR for the care of your patient.     Eran Ravi MD  Interventional Radiology

## 2020-08-20 NOTE — PLAN OF CARE
Problem: Occupational Therapy Goal  Goal: Occupational Therapy Goal  Description: Goals to be met by: 09/02/20     Patient will increase functional independence with ADLs by performing:    Feeding with Modified [place].  UE Dressing with Modified [place].  LE Dressing with Minimal Assistance.  Grooming while seated with Modified [place].  Toileting from bedside commode with Stand-by Assistance for hygiene and clothing management.   Rolling to Bilateral with Supervision.   Supine to sit with Supervision.  Step transfer with Stand-by Assistance  Stand pivot transfer with Stand-by Assistance  Bed>wheelchair transfer with Stand-by assistance   Pt will manually propel w/c household distance with MOD I.   Toilet transfer to bedside commode with Stand-by Assistance.  Upper extremity exercise program x15 reps per handout, with independence.    Outcome: Ongoing, Progressing     Total A to sit EOB today; MOD A for sit>supine. Session shortened d/t transport present. OT rec SNF at discharge to address functional deficits and increase independence with ADLs and all aspects of functional mobility.

## 2020-08-20 NOTE — PT/OT/SLP PROGRESS
Physical Therapy      Patient Name:  Miguel Moore   MRN:  [id number]    Patient not seen today secondary to first attempt pt in IR, 2nd attempt  Dialysis. Will follow-up as able.    Julián Milton, PTA

## 2020-08-21 PROBLEM — M62.82 NON-TRAUMATIC RHABDOMYOLYSIS: Status: RESOLVED | Noted: 2020-08-15 | Resolved: 2020-08-21

## 2020-08-21 PROBLEM — E87.5 HYPERKALEMIA: Status: RESOLVED | Noted: 2020-08-18 | Resolved: 2020-08-21

## 2020-08-21 LAB
ALBUMIN SERPL BCP-MCNC: 2.6 G/DL (ref 3.5–5.2)
ALP SERPL-CCNC: 99 U/L (ref 55–135)
ALT SERPL W/O P-5'-P-CCNC: 21 U/L (ref 10–44)
ANION GAP SERPL CALC-SCNC: 10 MMOL/L (ref 8–16)
AST SERPL-CCNC: 23 U/L (ref 10–40)
BILIRUB SERPL-MCNC: 0.2 MG/DL (ref 0.1–1)
BUN SERPL-MCNC: 50 MG/DL (ref 8–23)
CALCIUM SERPL-MCNC: 6.8 MG/DL (ref 8.7–10.5)
CHLORIDE SERPL-SCNC: 102 MMOL/L (ref 95–110)
CO2 SERPL-SCNC: 20 MMOL/L (ref 23–29)
CREAT SERPL-MCNC: 5.2 MG/DL (ref 0.5–1.4)
EST. GFR  (AFRICAN AMERICAN): 12 ML/MIN/1.73 M^2
EST. GFR  (NON AFRICAN AMERICAN): 11 ML/MIN/1.73 M^2
GLUCOSE SERPL-MCNC: 142 MG/DL (ref 70–110)
HAV IGM SERPL QL IA: NEGATIVE
HBV CORE IGM SERPL QL IA: NEGATIVE
HBV SURFACE AG SERPL QL IA: NEGATIVE
HCV AB SERPL QL IA: NEGATIVE
POCT GLUCOSE: 140 MG/DL (ref 70–110)
POTASSIUM SERPL-SCNC: 5.2 MMOL/L (ref 3.5–5.1)
PROT SERPL-MCNC: 5.9 G/DL (ref 6–8.4)
SODIUM SERPL-SCNC: 132 MMOL/L (ref 136–145)

## 2020-08-21 PROCEDURE — 63600175 PHARM REV CODE 636 W HCPCS: Performed by: RADIOLOGY

## 2020-08-21 PROCEDURE — 25000003 PHARM REV CODE 250: Performed by: INTERNAL MEDICINE

## 2020-08-21 PROCEDURE — 21400001 HC TELEMETRY ROOM

## 2020-08-21 PROCEDURE — 25000003 PHARM REV CODE 250: Performed by: HOSPITALIST

## 2020-08-21 PROCEDURE — 63600175 PHARM REV CODE 636 W HCPCS: Performed by: INTERNAL MEDICINE

## 2020-08-21 PROCEDURE — 80053 COMPREHEN METABOLIC PANEL: CPT

## 2020-08-21 PROCEDURE — 90935 HEMODIALYSIS ONE EVALUATION: CPT

## 2020-08-21 PROCEDURE — 94761 N-INVAS EAR/PLS OXIMETRY MLT: CPT

## 2020-08-21 PROCEDURE — 36415 COLL VENOUS BLD VENIPUNCTURE: CPT

## 2020-08-21 RX ORDER — HEPARIN SODIUM 5000 [USP'U]/ML
10000 INJECTION, SOLUTION INTRAVENOUS; SUBCUTANEOUS ONCE
Status: COMPLETED | OUTPATIENT
Start: 2020-08-21 | End: 2020-08-21

## 2020-08-21 RX ORDER — LABETALOL 100 MG/1
100 TABLET, FILM COATED ORAL EVERY 12 HOURS
Status: DISCONTINUED | OUTPATIENT
Start: 2020-08-21 | End: 2020-09-01 | Stop reason: HOSPADM

## 2020-08-21 RX ORDER — FENTANYL CITRATE 50 UG/ML
INJECTION, SOLUTION INTRAMUSCULAR; INTRAVENOUS CODE/TRAUMA/SEDATION MEDICATION
Status: COMPLETED | OUTPATIENT
Start: 2020-08-21 | End: 2020-08-21

## 2020-08-21 RX ORDER — MIDAZOLAM HYDROCHLORIDE 1 MG/ML
INJECTION INTRAMUSCULAR; INTRAVENOUS CODE/TRAUMA/SEDATION MEDICATION
Status: COMPLETED | OUTPATIENT
Start: 2020-08-21 | End: 2020-08-21

## 2020-08-21 RX ADMIN — ACETAMINOPHEN 650 MG: 325 TABLET ORAL at 04:08

## 2020-08-21 RX ADMIN — HYDRALAZINE HYDROCHLORIDE 25 MG: 25 TABLET, FILM COATED ORAL at 05:08

## 2020-08-21 RX ADMIN — ATORVASTATIN CALCIUM 80 MG: 40 TABLET, FILM COATED ORAL at 09:08

## 2020-08-21 RX ADMIN — MIDAZOLAM HYDROCHLORIDE 1 MG: 1 INJECTION, SOLUTION INTRAMUSCULAR; INTRAVENOUS at 08:08

## 2020-08-21 RX ADMIN — Medication 6 MG: at 11:08

## 2020-08-21 RX ADMIN — PHENYTOIN SODIUM 100 MG: 100 CAPSULE ORAL at 10:08

## 2020-08-21 RX ADMIN — MAGNESIUM OXIDE TAB 400 MG (241.3 MG ELEMENTAL MG) 400 MG: 400 (241.3 MG) TAB at 10:08

## 2020-08-21 RX ADMIN — CLOPIDOGREL 75 MG: 75 TABLET, FILM COATED ORAL at 09:08

## 2020-08-21 RX ADMIN — PHENYTOIN SODIUM 100 MG: 100 CAPSULE ORAL at 09:08

## 2020-08-21 RX ADMIN — CALCIUM ACETATE 667 MG: 667 CAPSULE ORAL at 04:08

## 2020-08-21 RX ADMIN — HEPARIN SODIUM 10000 UNITS: 5000 INJECTION, SOLUTION INTRAVENOUS; SUBCUTANEOUS at 08:08

## 2020-08-21 RX ADMIN — TAMSULOSIN HYDROCHLORIDE 0.8 MG: 0.4 CAPSULE ORAL at 09:08

## 2020-08-21 RX ADMIN — HYDRALAZINE HYDROCHLORIDE 25 MG: 25 TABLET, FILM COATED ORAL at 10:08

## 2020-08-21 RX ADMIN — MAGNESIUM OXIDE TAB 400 MG (241.3 MG ELEMENTAL MG) 400 MG: 400 (241.3 MG) TAB at 09:08

## 2020-08-21 RX ADMIN — CALCITRIOL CAPSULES 0.25 MCG 0.25 MCG: 0.25 CAPSULE ORAL at 09:08

## 2020-08-21 RX ADMIN — PHENYTOIN SODIUM 100 MG: 100 CAPSULE ORAL at 04:08

## 2020-08-21 RX ADMIN — FENTANYL CITRATE 50 MCG: 50 INJECTION INTRAMUSCULAR; INTRAVENOUS at 08:08

## 2020-08-21 RX ADMIN — MUPIROCIN: 20 OINTMENT TOPICAL at 10:08

## 2020-08-21 RX ADMIN — FUROSEMIDE 40 MG: 10 INJECTION, SOLUTION INTRAVENOUS at 04:08

## 2020-08-21 RX ADMIN — HEPARIN SODIUM 5000 UNITS: 5000 INJECTION INTRAVENOUS; SUBCUTANEOUS at 05:08

## 2020-08-21 RX ADMIN — CALCIUM ACETATE 667 MG: 667 CAPSULE ORAL at 09:08

## 2020-08-21 NOTE — PT/OT/SLP PROGRESS
"Physical Therapy      Patient Name:  Miguel Moore   MRN:  [id number]    Patient not seen today secondary to Other (pt has an active bedrest order for 2 hours post procedure) , second attempt 1600 pm, pt stated " today is not a good day , I can't do it "  . Will follow-up as able later hour/day.    Winter Rose PTA    "

## 2020-08-21 NOTE — PLAN OF CARE
Problem: Fall Injury Risk  Goal: Absence of Fall and Fall-Related Injury  Outcome: Ongoing, Progressing  Intervention: Identify and Manage Contributors to Fall Injury Risk  Flowsheets (Taken 8/21/2020 0643)  Self-Care Promotion:   independence encouraged   BADL personal objects within reach   BADL personal routines maintained  Medication Review/Management:   medications reviewed   high risk medications identified  Intervention: Promote Injury-Free Environment  Flowsheets (Taken 8/21/2020 0643)  Safety Promotion/Fall Prevention:   assistive device/personal item within reach   medications reviewed   nonskid shoes/socks when out of bed   instructed to call staff for mobility   supervised activity   Fall Risk reviewed with patient/family   high risk medications identified  Environmental Safety Modification:   assistive device/personal items within reach   clutter free environment maintained   lighting adjusted     Problem: Adult Inpatient Plan of Care  Goal: Plan of Care Review  Flowsheets (Taken 8/21/2020 0643)  Plan of Care Reviewed With: patient  Goal: Patient-Specific Goal (Individualization)  Outcome: Ongoing, Progressing  Goal: Absence of Hospital-Acquired Illness or Injury  Outcome: Ongoing, Progressing  Goal: Optimal Comfort and Wellbeing  Outcome: Ongoing, Progressing  Goal: Readiness for Transition of Care  Outcome: Ongoing, Progressing  Goal: Rounds/Family Conference  Outcome: Ongoing, Progressing

## 2020-08-21 NOTE — SEDATION DOCUMENTATION
Procedure end. Tolerated procedure well. Site sutured with biopatch, gauze and tegaderm, CDI, no redness, swelling or hematoma noted. VSS. NADN. Both ports heparin locked. Report called to DARRION Hernandez.

## 2020-08-21 NOTE — PLAN OF CARE
[place] spoke with patient and daughter regarding DC plan and SNF option; daughter agrees that patient will need short stay in SNF before discharging home; list of accepting facilities provided to daughter to review; [place] to follow up on Monday for choices

## 2020-08-21 NOTE — PROGRESS NOTES
Awake alert oriented NAD    Back form IR with new tunneled HD cath     Denies CNS ENT CP GI  RHEUM OR DERM SX  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    Stroke     Urinary tract infection      Past Surgical History:   Procedure Laterality Date    CATARACT EXTRACTION W/  INTRAOCULAR LENS IMPLANT Right 10/05/2017    Dr. Tay    CATARACT EXTRACTION W/  INTRAOCULAR LENS IMPLANT Left 10/19/2017    Dr. Tay    ESOPHAGOGASTRODUODENOSCOPY N/A 8/17/2020    Procedure: EGD (ESOPHAGOGASTRODUODENOSCOPY);  Surgeon: Desmond Chapman MD;  Location: [facility];  Service: Endoscopy;  Laterality: N/A;    FEMORAL ARTERY STENT      KNEE SURGERY      bilateral scope     Review of patient's allergies indicates:   Allergen Reactions    Ace inhibitors      Hyperkalemia 8/2018    Penicillins Hives    Tizanidine      Felt hot       Current Facility-Administered Medications   Medication    acetaminophen tablet 650 mg    atorvastatin tablet 80 mg    calcitRIOL capsule 0.25 mcg    calcium acetate(phosphat bind) capsule 667 mg    clopidogreL tablet 75 mg    dextrose 50% injection 12.5 g    dextrose 50% injection 25 g    furosemide injection 40 mg    glucagon (human recombinant) injection 1 mg    glucose chewable tablet 16 g    glucose chewable tablet 24 g    heparin (porcine) injection 5,000 Units    hydrALAZINE injection 10 mg    hydrALAZINE tablet 25 mg    insulin aspart U-100 pen 1-10 Units    labetaloL tablet 200 mg    magnesium oxide tablet 400 mg    melatonin tablet 6 mg    metoprolol injection 5 mg    mupirocin 2 % ointment    phenytoin (DILANTIN) ER capsule 100 mg    sodium chloride 0.9% flush 10 mL    sodium chloride 0.9% flush 10 mL    tamsulosin 24 hr capsule 0.8 mg    traZODone tablet 50 mg       LABS    Recent Results (from the past 24 hour(s))   POCT glucose    Collection Time: 08/20/20  8:14  PM   Result Value Ref Range    POCT Glucose 150 (H) 70 - 110 mg/dL   Comprehensive metabolic panel    Collection Time: 08/21/20  4:56 AM   Result Value Ref Range    Sodium 132 (L) 136 - 145 mmol/L    Potassium 5.2 (H) 3.5 - 5.1 mmol/L    Chloride 102 95 - 110 mmol/L    CO2 20 (L) 23 - 29 mmol/L    Glucose 142 (H) 70 - 110 mg/dL    BUN, Bld 50 (H) 8 - 23 mg/dL    Creatinine 5.2 (H) 0.5 - 1.4 mg/dL    Calcium 6.8 (LL) 8.7 - 10.5 mg/dL    Total Protein 5.9 (L) 6.0 - 8.4 g/dL    Albumin 2.6 (L) 3.5 - 5.2 g/dL    Total Bilirubin 0.2 0.1 - 1.0 mg/dL    Alkaline Phosphatase 99 55 - 135 U/L    AST 23 10 - 40 U/L    ALT 21 10 - 44 U/L    Anion Gap 10 8 - 16 mmol/L    eGFR if African American 12 (A) >60 mL/min/1.73 m^2    eGFR if non African American 11 (A) >60 mL/min/1.73 m^2   ]    I/O last 3 completed shifts:  In: 960 [P.O.:460; Other:500]  Out: 1750 [Urine:250; Other:1500]    Vitals:    08/21/20 0855 08/21/20 0900 08/21/20 0906 08/21/20 1140   BP: (!) 117/53 (!) 113/55  (!) 118/59   Pulse: 75 73  83   Resp: 16 16  18   Temp:    97.8 °F (36.6 °C)   TempSrc:    Oral   SpO2: 100% 100% 99% 99%   Weight:       Height:           No Jvd, Thyromegaly or Lymphadenopathy  Lungs: Fairly clear anteriorly and laterally  Cor: RRR no G or rubs  Abd: Soft benign good bowel sounds non tender  Ext: Pos edema    A)    Nephrotic snd (with smallish renal shadows on US), likely diabetic in nature.   Anemia  BPH (co inability to void better)  CKD4 creat continue to rise (not a good sign)  Hyperpth on binders and vitd analogs  DM  HTN  Obesity  Hyperpth     P)  Renal Diet  Home meds  Protect L arm for future access  EPO added  Binders  Adjust all meds to the degree of renal fx  Close follow up I/O and weights  Maintain Hydration   Dialysis TTS

## 2020-08-21 NOTE — SUBJECTIVE & OBJECTIVE
Interval History: Doing ok.    Review of Systems   HENT: Negative for ear discharge and ear pain.    Eyes: Negative for discharge and itching.   Endocrine: Negative for cold intolerance and heat intolerance.   Neurological: Negative for seizures and syncope.     Objective:     Vital Signs (Most Recent):  Temp: 97.8 °F (36.6 °C) (08/21/20 1140)  Pulse: 83 (08/21/20 1140)  Resp: 18 (08/21/20 1140)  BP: (!) 118/59 (08/21/20 1140)  SpO2: 99 % (08/21/20 1140) Vital Signs (24h Range):  Temp:  [97.6 °F (36.4 °C)-98.4 °F (36.9 °C)] 97.8 °F (36.6 °C)  Pulse:  [71-95] 83  Resp:  [16-20] 18  SpO2:  [99 %-100 %] 99 %  BP: (104-141)/(44-65) 118/59     Weight: 98 kg (216 lb 0.8 oz)  Body mass index is 32.85 kg/m².    Intake/Output Summary (Last 24 hours) at 8/21/2020 1229  Last data filed at 8/21/2020 0500  Gross per 24 hour   Intake 600 ml   Output 1500 ml   Net -900 ml      Physical Exam  Vitals signs and nursing note reviewed.   Constitutional:       General: He is not in acute distress.     Appearance: He is well-developed.   HENT:      Head: Normocephalic and atraumatic.      Right Ear: External ear normal.      Left Ear: External ear normal.      Nose: Nose normal.   Eyes:      Conjunctiva/sclera: Conjunctivae normal.      Pupils: Pupils are equal, round, and reactive to light.   Neck:      Musculoskeletal: Normal range of motion and neck supple.   Cardiovascular:      Rate and Rhythm: Normal rate and regular rhythm.   Pulmonary:      Effort: Pulmonary effort is normal. No respiratory distress.      Breath sounds: Rales present. No wheezing.   Abdominal:      General: Bowel sounds are normal. There is no distension.      Palpations: Abdomen is soft.      Tenderness: There is no abdominal tenderness.      Comments: No palpable hepatomegaly or splenomegaly   Musculoskeletal:         General: Swelling present. No tenderness.      Right lower leg: Edema present.      Left lower leg: Edema present.      Comments: Activity  intolerance   Skin:     General: Skin is warm and dry.      Comments: Swelling and pitting edema up to abdomen and flank   Neurological:      Mental Status: He is alert and oriented to person, place, and time.      Motor: Weakness present.   Psychiatric:         Thought Content: Thought content normal.         Significant Labs:   BMP:   Recent Labs   Lab 08/21/20  0456   *   *   K 5.2*      CO2 20*   BUN 50*   CREATININE 5.2*   CALCIUM 6.8*     CBC:   Recent Labs   Lab 08/20/20  0457   WBC 5.81   HGB 8.5*   HCT 24.8*          Significant Imaging: I have reviewed all pertinent imaging results/findings within the past 24 hours.

## 2020-08-21 NOTE — PT/OT/SLP PROGRESS
Occupational Therapy      Patient Name:  Miguel Moore   MRN:  [id number]    Patient not seen today secondary to (off the floor with bedrest orders following). Will follow-up later as able.    Minerva Pleitez OT  8/21/2020

## 2020-08-21 NOTE — SEDATION DOCUMENTATION
Guidewire advanced through existing rahul catheter under fluoro. Tolerated well. No arrhythmias noted on monitor.

## 2020-08-21 NOTE — ASSESSMENT & PLAN NOTE
Patient with profound edema - anasarca up to abdomen - 2-3+ pitting after overnight diuresis + 3+ proteinuria - CKD stage 4 worsened from creatine baseline up to creatine of around 3-->5.8, severe activity intolerance - Nephrology consulted and following - diureses, strict I&Os, daily weight  -stop amlodipine - added labetalol per nephrology.  Also started on Hydralazine.  Increasing Creat with persistent hyperkalemia.  HD catheter placed and patient underwent HD on 8/20.  Further HD per Nephrology.

## 2020-08-21 NOTE — SEDATION DOCUMENTATION
Consent obtained per MD and verified per RN. Pt wheeled into IR procedure room and assisted to fluoro procedure table. Attached to vitals monitor. VSS. NADN. Prepped in sterile fashion per RT.

## 2020-08-21 NOTE — BRIEF OP NOTE
Radiology Post-Procedure Note    Pre Op Diagnosis: Stage IV CKD, nephrotic syndrome and hyperkalemia  Post Op Diagnosis: Same    Procedure: Fluoroscopic-guided placement of a 19.0-cm RIJV-approach THDC    Procedure performed by: Eran Ravi MD    Written Informed Consent Obtained: Yes  Specimen Removed: NO  Estimated Blood Loss: Minimal    Findings:   Successful fluoroscopic-guided placement of a 19.0-cm RIJV-approach THDC under moderate conscious sedation. Patient tolerated the procedure well. No immediate post-procedural complications noted.     Bed rest for 2 hours with HOB elevated at least 30 degrees.     Catheter is ready for use immediately.    Thank you for considering IR for the care of your patient.     Eran Ravi MD  Interventional Radiology

## 2020-08-21 NOTE — SEDATION DOCUMENTATION
Tunneled catheter advanced over guidewire. Placement confirmed under fluoro. Guidewire removed. Tolerating well. Both ports assessed per MD - each with adequate blood return and flush easily.

## 2020-08-21 NOTE — PROGRESS NOTES
[facility] Hospital Medicine  Progress Note    Patient Name: Miguel Moore  MRN: [id number]  Patient Class: IP- Inpatient   Admission Date: 8/15/2020  Length of Stay: 5 days  Attending Physician: Barrie Negro MD  Primary Care Provider: Raciel Raymond MD        Subjective:     Principal Problem:Nephrotic syndrome        HPI:  65 y.o. 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  States he was called on 8/13/2020, but did not come to the ED until today.  He reports mild edema and weight gain of 1-2 lb.  Denies fever, chills, cough, SOB, chest pain, palpitations, orthopnea, PND, dizziness, syncope, n/v/d, abd pain, or dysuria.  In the ED, Cr/BUN 5.3/64, K+ 5.4, H/H 7.5/22.7, CPK 1262, , UA shows proteinura 3+, renal US suggest chronic medical renal disease.  His Nephrologist is Dr. Roa, last visit Oct 2019.  Nephrology consult.  Placed in observation.    Overview/Hospital Course:  65 y.o. AAM with history significant for DMII, HTN, stroke, and CKD4 presents for evaluation of worsening CKD sent in by provider. Creatine = 2.0 1 year ago now with creatine 5.4 and potassium 5.8 worsening swelling and anasarca 2-3+ edema up to abdomen and flank, activity intolerance and orthopnea X 5 pillows. He is not on hemodialysis.  Amlodipine stopped.  Worsening BP and started on Hydralazine.  Nephrology consulted.  BP improved, but worsening Creat with persistent hyperkalemia.  Patient will need HD.  IR consulted for HD catheter.  Temporary HD catheter placed on 8/20 and patient underwent HD.  Catheter switched to tunneled catheter on 8/21.    Interval History: Doing ok.    Review of Systems   HENT: Negative for ear discharge and ear pain.    Eyes: Negative for discharge and itching.   Endocrine: Negative for  cold intolerance and heat intolerance.   Neurological: Negative for seizures and syncope.     Objective:     Vital Signs (Most Recent):  Temp: 97.8 °F (36.6 °C) (08/21/20 1140)  Pulse: 83 (08/21/20 1140)  Resp: 18 (08/21/20 1140)  BP: (!) 118/59 (08/21/20 1140)  SpO2: 99 % (08/21/20 1140) Vital Signs (24h Range):  Temp:  [97.6 °F (36.4 °C)-98.4 °F (36.9 °C)] 97.8 °F (36.6 °C)  Pulse:  [71-95] 83  Resp:  [16-20] 18  SpO2:  [99 %-100 %] 99 %  BP: (104-141)/(44-65) 118/59     Weight: 98 kg (216 lb 0.8 oz)  Body mass index is 32.85 kg/m².    Intake/Output Summary (Last 24 hours) at 8/21/2020 1229  Last data filed at 8/21/2020 0500  Gross per 24 hour   Intake 600 ml   Output 1500 ml   Net -900 ml      Physical Exam  Vitals signs and nursing note reviewed.   Constitutional:       General: He is not in acute distress.     Appearance: He is well-developed.   HENT:      Head: Normocephalic and atraumatic.      Right Ear: External ear normal.      Left Ear: External ear normal.      Nose: Nose normal.   Eyes:      Conjunctiva/sclera: Conjunctivae normal.      Pupils: Pupils are equal, round, and reactive to light.   Neck:      Musculoskeletal: Normal range of motion and neck supple.   Cardiovascular:      Rate and Rhythm: Normal rate and regular rhythm.   Pulmonary:      Effort: Pulmonary effort is normal. No respiratory distress.      Breath sounds: Rales present. No wheezing.   Abdominal:      General: Bowel sounds are normal. There is no distension.      Palpations: Abdomen is soft.      Tenderness: There is no abdominal tenderness.      Comments: No palpable hepatomegaly or splenomegaly   Musculoskeletal:         General: Swelling present. No tenderness.      Right lower leg: Edema present.      Left lower leg: Edema present.      Comments: Activity intolerance   Skin:     General: Skin is warm and dry.      Comments: Swelling and pitting edema up to abdomen and flank   Neurological:      Mental Status: He is alert and  oriented to person, place, and time.      Motor: Weakness present.   Psychiatric:         Thought Content: Thought content normal.         Significant Labs:   BMP:   Recent Labs   Lab 08/21/20  0456   *   *   K 5.2*      CO2 20*   BUN 50*   CREATININE 5.2*   CALCIUM 6.8*     CBC:   Recent Labs   Lab 08/20/20  0457   WBC 5.81   HGB 8.5*   HCT 24.8*          Significant Imaging: I have reviewed all pertinent imaging results/findings within the past 24 hours.      Assessment/Plan:      * Nephrotic syndrome  Patient with profound edema - anasarca up to abdomen - 2-3+ pitting after overnight diuresis + 3+ proteinuria - CKD stage 4 worsened from creatine baseline up to creatine of around 3-->5.8, severe activity intolerance - Nephrology consulted and following - diureses, strict I&Os, daily weight  -stop amlodipine - added labetalol per nephrology.  Also started on Hydralazine.  Increasing Creat with persistent hyperkalemia.  HD catheter placed and patient underwent HD on 8/20.  Further HD per Nephrology.    Anemia  Anemia of CKD  H/H stable since admission with no evidence of bleeding.        Enlarged prostate  Continue home flomax - voiding well - strict I&Os      Hypocalcemia  Started on calcitrol 0.25 mcg and calcium acetate 667 po tid AC    Anasarca  See above      Type 2 diabetes mellitus with diabetic neuropathy, with long-term current use of insulin        CKD (chronic kidney disease) stage 4, GFR 15-29 ml/min with proteinuria  Acute on chronic CKD stage 4  Appreciate Nephrology input.  As above.    Hemiplegia and hemiparesis following cerebral infarction affecting right dominant side  Stable, no acute issue, continue plavix/statin.  PT/OT evaluation.    Seizure disorder as sequela of cerebrovascular accident  Continue home regimen of phenytoin    Benign non-nodular prostatic hyperplasia without lower urinary tract symptoms  Continue home regimen of tamsulosin     Controlled type 2  diabetes mellitus with stage 4 chronic kidney disease, with long-term current use of insulin  Last HgbA1c   Lab Results   Component Value Date    HGBA1C 5.5 08/13/2020     Hold oral antihyperglycemics while inpatient  PRN sliding scale insulin  ACHS glucose monitoring   ADA diet     Pure hypercholesterolemia  Continue statin    Benign essential HTN; ACEI hyperK  Uncontrolled.  Norvasc stopped with lower extremity edema.  Started on Hydralazine.  Increase as needed.  Improving.      VTE Risk Mitigation (From admission, onward)         Ordered     heparin (porcine) injection 5,000 Units  As needed (PRN)      08/20/20 1251     IP VTE HIGH RISK PATIENT  Once      08/15/20 1505     Place sequential compression device  Until discontinued      08/15/20 1505                Discharge Planning   GARY:      Code Status: Full Code   Is the patient medically ready for discharge?:     Reason for patient still in hospital (select all that apply): Patient trending condition  Discharge Plan A: Skilled Nursing Facility   Discharge Delays: None known at this time              Barrie Negro MD  Department of Hospital Medicine   [facility]

## 2020-08-21 NOTE — NURSING
Report received from DARRION Hernandez, Patient resting comfortably in bed, no acute distress noted. Plan of care reviewed with patient. Instructed patient to call for assistance before ambulating, side rails up x2, bed alarm set, call light in reach, non skid socks in use.  Peripheral IV site, no redness or swelling noted. Quention Cath site clean and intact, no bleeding or hematoma noted.Pain free as claimed.  Patient verbalized understanding of instructions.  Will continue to monitor.

## 2020-08-21 NOTE — PROGRESS NOTES
After d/w pt and review of pts EMR, there has been no significant interval change when compared to H/P from 8/20/20. Will proceed with US and fluoroscopic-guided placement of a RIJV-approach THDC under moderate conscious sedation.     Risks (including, but not limited to, pain, bleeding, infection, damage to nearby structures, failure to obtain sufficient material for a diagnosis, the need for additional procedures, and death), benefits, and alternatives were discussed with the patient. All questions were answered to the best of my abilities. The patient wishes to proceed with the procedure. Written informed consent was obtained.    Thank you for considering IR for the care of your patient.     Eran Ravi MD  Interventional Radiology

## 2020-08-22 LAB
ALBUMIN SERPL BCP-MCNC: 2.6 G/DL (ref 3.5–5.2)
ALP SERPL-CCNC: 99 U/L (ref 55–135)
ALT SERPL W/O P-5'-P-CCNC: 18 U/L (ref 10–44)
ANION GAP SERPL CALC-SCNC: 12 MMOL/L (ref 8–16)
AST SERPL-CCNC: 20 U/L (ref 10–40)
BASOPHILS # BLD AUTO: 0.04 K/UL (ref 0–0.2)
BASOPHILS NFR BLD: 0.6 % (ref 0–1.9)
BILIRUB SERPL-MCNC: 0.2 MG/DL (ref 0.1–1)
BUN SERPL-MCNC: 59 MG/DL (ref 8–23)
CALCIUM SERPL-MCNC: 6.9 MG/DL (ref 8.7–10.5)
CHLORIDE SERPL-SCNC: 102 MMOL/L (ref 95–110)
CO2 SERPL-SCNC: 19 MMOL/L (ref 23–29)
CREAT SERPL-MCNC: 6.1 MG/DL (ref 0.5–1.4)
DIFFERENTIAL METHOD: ABNORMAL
EOSINOPHIL # BLD AUTO: 0 K/UL (ref 0–0.5)
EOSINOPHIL NFR BLD: 0.6 % (ref 0–8)
ERYTHROCYTE [DISTWIDTH] IN BLOOD BY AUTOMATED COUNT: 13.8 % (ref 11.5–14.5)
EST. GFR  (AFRICAN AMERICAN): 10 ML/MIN/1.73 M^2
EST. GFR  (NON AFRICAN AMERICAN): 9 ML/MIN/1.73 M^2
GLUCOSE SERPL-MCNC: 134 MG/DL (ref 70–110)
HCT VFR BLD AUTO: 24.7 % (ref 40–54)
HGB BLD-MCNC: 8.2 G/DL (ref 14–18)
IMM GRANULOCYTES # BLD AUTO: 0.01 K/UL (ref 0–0.04)
IMM GRANULOCYTES NFR BLD AUTO: 0.2 % (ref 0–0.5)
LYMPHOCYTES # BLD AUTO: 0.6 K/UL (ref 1–4.8)
LYMPHOCYTES NFR BLD: 9.6 % (ref 18–48)
MCH RBC QN AUTO: 25.1 PG (ref 27–31)
MCHC RBC AUTO-ENTMCNC: 33.2 G/DL (ref 32–36)
MCV RBC AUTO: 76 FL (ref 82–98)
MONOCYTES # BLD AUTO: 0.8 K/UL (ref 0.3–1)
MONOCYTES NFR BLD: 12.5 % (ref 4–15)
NEUTROPHILS # BLD AUTO: 4.8 K/UL (ref 1.8–7.7)
NEUTROPHILS NFR BLD: 76.5 % (ref 38–73)
NRBC BLD-RTO: 0 /100 WBC
PLATELET # BLD AUTO: 166 K/UL (ref 150–350)
PMV BLD AUTO: 10.5 FL (ref 9.2–12.9)
POCT GLUCOSE: 147 MG/DL (ref 70–110)
POCT GLUCOSE: 147 MG/DL (ref 70–110)
POCT GLUCOSE: 207 MG/DL (ref 70–110)
POTASSIUM SERPL-SCNC: 5 MMOL/L (ref 3.5–5.1)
PROT SERPL-MCNC: 5.9 G/DL (ref 6–8.4)
RBC # BLD AUTO: 3.27 M/UL (ref 4.6–6.2)
SODIUM SERPL-SCNC: 133 MMOL/L (ref 136–145)
WBC # BLD AUTO: 6.22 K/UL (ref 3.9–12.7)

## 2020-08-22 PROCEDURE — 63600175 PHARM REV CODE 636 W HCPCS: Performed by: INTERNAL MEDICINE

## 2020-08-22 PROCEDURE — 80053 COMPREHEN METABOLIC PANEL: CPT

## 2020-08-22 PROCEDURE — 25000003 PHARM REV CODE 250: Performed by: INTERNAL MEDICINE

## 2020-08-22 PROCEDURE — 85025 COMPLETE CBC W/AUTO DIFF WBC: CPT

## 2020-08-22 PROCEDURE — 94761 N-INVAS EAR/PLS OXIMETRY MLT: CPT

## 2020-08-22 PROCEDURE — 25000003 PHARM REV CODE 250: Performed by: HOSPITALIST

## 2020-08-22 PROCEDURE — 21400001 HC TELEMETRY ROOM

## 2020-08-22 PROCEDURE — 36415 COLL VENOUS BLD VENIPUNCTURE: CPT

## 2020-08-22 PROCEDURE — 90935 HEMODIALYSIS ONE EVALUATION: CPT

## 2020-08-22 RX ORDER — DIAZEPAM 5 MG/1
5 TABLET ORAL EVERY 6 HOURS PRN
Status: CANCELLED | OUTPATIENT
Start: 2020-08-22

## 2020-08-22 RX ORDER — DIAZEPAM 2 MG/1
2 TABLET ORAL 2 TIMES DAILY PRN
Status: DISCONTINUED | OUTPATIENT
Start: 2020-08-22 | End: 2020-09-01 | Stop reason: HOSPADM

## 2020-08-22 RX ADMIN — EPOETIN ALFA-EPBX 10000 UNITS: 10000 INJECTION, SOLUTION INTRAVENOUS; SUBCUTANEOUS at 07:08

## 2020-08-22 RX ADMIN — PHENYTOIN SODIUM 100 MG: 100 CAPSULE ORAL at 05:08

## 2020-08-22 RX ADMIN — CLOPIDOGREL 75 MG: 75 TABLET, FILM COATED ORAL at 09:08

## 2020-08-22 RX ADMIN — FUROSEMIDE 40 MG: 10 INJECTION, SOLUTION INTRAVENOUS at 06:08

## 2020-08-22 RX ADMIN — HEPARIN SODIUM 5000 UNITS: 5000 INJECTION INTRAVENOUS; SUBCUTANEOUS at 11:08

## 2020-08-22 RX ADMIN — HYDRALAZINE HYDROCHLORIDE 25 MG: 25 TABLET, FILM COATED ORAL at 05:08

## 2020-08-22 RX ADMIN — Medication 6 MG: at 10:08

## 2020-08-22 RX ADMIN — MUPIROCIN: 20 OINTMENT TOPICAL at 10:08

## 2020-08-22 RX ADMIN — CALCIUM ACETATE 667 MG: 667 CAPSULE ORAL at 09:08

## 2020-08-22 RX ADMIN — LABETALOL HYDROCHLORIDE 100 MG: 100 TABLET, FILM COATED ORAL at 10:08

## 2020-08-22 RX ADMIN — HEPARIN SODIUM 5000 UNITS: 5000 INJECTION INTRAVENOUS; SUBCUTANEOUS at 02:08

## 2020-08-22 RX ADMIN — MAGNESIUM OXIDE TAB 400 MG (241.3 MG ELEMENTAL MG) 400 MG: 400 (241.3 MG) TAB at 10:08

## 2020-08-22 RX ADMIN — PHENYTOIN SODIUM 100 MG: 100 CAPSULE ORAL at 10:08

## 2020-08-22 RX ADMIN — CALCITRIOL CAPSULES 0.25 MCG 0.25 MCG: 0.25 CAPSULE ORAL at 09:08

## 2020-08-22 RX ADMIN — ATORVASTATIN CALCIUM 80 MG: 40 TABLET, FILM COATED ORAL at 09:08

## 2020-08-22 RX ADMIN — FUROSEMIDE 40 MG: 10 INJECTION, SOLUTION INTRAVENOUS at 03:08

## 2020-08-22 RX ADMIN — CALCIUM ACETATE 667 MG: 667 CAPSULE ORAL at 05:08

## 2020-08-22 RX ADMIN — DIAZEPAM 2 MG: 2 TABLET ORAL at 06:08

## 2020-08-22 RX ADMIN — PHENYTOIN SODIUM 100 MG: 100 CAPSULE ORAL at 09:08

## 2020-08-22 RX ADMIN — TAMSULOSIN HYDROCHLORIDE 0.8 MG: 0.4 CAPSULE ORAL at 09:08

## 2020-08-22 RX ADMIN — MAGNESIUM OXIDE TAB 400 MG (241.3 MG ELEMENTAL MG) 400 MG: 400 (241.3 MG) TAB at 09:08

## 2020-08-22 RX ADMIN — HYDRALAZINE HYDROCHLORIDE 25 MG: 25 TABLET, FILM COATED ORAL at 10:08

## 2020-08-22 RX ADMIN — INSULIN ASPART 2 UNITS: 100 INJECTION, SOLUTION INTRAVENOUS; SUBCUTANEOUS at 10:08

## 2020-08-22 RX ADMIN — MUPIROCIN: 20 OINTMENT TOPICAL at 09:08

## 2020-08-22 NOTE — NURSING
End of shift bedside report given to DARRION Linda. Patient in no apparent distress.     12 hour chart check complete.

## 2020-08-22 NOTE — NURSING
IR notified that patient was bleeding from site on Tunnel Cath was placed this AM. Pressure was held but continued to slowly bleed. Eventually with more pressure held bleeding finally stopped.

## 2020-08-22 NOTE — NURSING
Bedside report received from DARRION Linda. Patient awake and alert sitting up in bed. NAD noted at this time. All safety precautions in place. Will continue to monitor.

## 2020-08-22 NOTE — PROGRESS NOTES
"Just back from HD    CO being "stiff" requesting a muscle relaxant    Awake alert oriented NAD    Denies CNS ENT CP GI  RHEUM OR DERM SX  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    Stroke     Urinary tract infection      Past Surgical History:   Procedure Laterality Date    CATARACT EXTRACTION W/  INTRAOCULAR LENS IMPLANT Right 10/05/2017    Dr. Tay    CATARACT EXTRACTION W/  INTRAOCULAR LENS IMPLANT Left 10/19/2017    Dr. Tay    ESOPHAGOGASTRODUODENOSCOPY N/A 8/17/2020    Procedure: EGD (ESOPHAGOGASTRODUODENOSCOPY);  Surgeon: Desmond Chapman MD;  Location: [facility];  Service: Endoscopy;  Laterality: N/A;    FEMORAL ARTERY STENT      KNEE SURGERY      bilateral scope     Review of patient's allergies indicates:   Allergen Reactions    Ace inhibitors      Hyperkalemia 8/2018    Penicillins Hives    Tizanidine      Felt hot       Current Facility-Administered Medications   Medication    acetaminophen tablet 650 mg    atorvastatin tablet 80 mg    calcitRIOL capsule 0.25 mcg    calcium acetate(phosphat bind) capsule 667 mg    clopidogreL tablet 75 mg    dextrose 50% injection 12.5 g    dextrose 50% injection 25 g    epoetin marisol-epbx injection 10,000 Units    furosemide injection 40 mg    glucagon (human recombinant) injection 1 mg    glucose chewable tablet 16 g    glucose chewable tablet 24 g    heparin (porcine) injection 5,000 Units    hydrALAZINE injection 10 mg    hydrALAZINE tablet 25 mg    insulin aspart U-100 pen 1-10 Units    labetaloL tablet 100 mg    magnesium oxide tablet 400 mg    melatonin tablet 6 mg    metoprolol injection 5 mg    mupirocin 2 % ointment    phenytoin (DILANTIN) ER capsule 100 mg    sodium chloride 0.9% flush 10 mL    sodium chloride 0.9% flush 10 mL    tamsulosin 24 hr capsule 0.8 mg    traZODone tablet 50 mg       LABS    Recent Results (from " the past 24 hour(s))   POCT glucose    Collection Time: 08/21/20  9:57 PM   Result Value Ref Range    POCT Glucose 140 (H) 70 - 110 mg/dL   Comprehensive metabolic panel    Collection Time: 08/22/20  5:17 AM   Result Value Ref Range    Sodium 133 (L) 136 - 145 mmol/L    Potassium 5.0 3.5 - 5.1 mmol/L    Chloride 102 95 - 110 mmol/L    CO2 19 (L) 23 - 29 mmol/L    Glucose 134 (H) 70 - 110 mg/dL    BUN, Bld 59 (H) 8 - 23 mg/dL    Creatinine 6.1 (H) 0.5 - 1.4 mg/dL    Calcium 6.9 (LL) 8.7 - 10.5 mg/dL    Total Protein 5.9 (L) 6.0 - 8.4 g/dL    Albumin 2.6 (L) 3.5 - 5.2 g/dL    Total Bilirubin 0.2 0.1 - 1.0 mg/dL    Alkaline Phosphatase 99 55 - 135 U/L    AST 20 10 - 40 U/L    ALT 18 10 - 44 U/L    Anion Gap 12 8 - 16 mmol/L    eGFR if African American 10 (A) >60 mL/min/1.73 m^2    eGFR if non African American 9 (A) >60 mL/min/1.73 m^2   CBC auto differential    Collection Time: 08/22/20  5:17 AM   Result Value Ref Range    WBC 6.22 3.90 - 12.70 K/uL    RBC 3.27 (L) 4.60 - 6.20 M/uL    Hemoglobin 8.2 (L) 14.0 - 18.0 g/dL    Hematocrit 24.7 (L) 40.0 - 54.0 %    Mean Corpuscular Volume 76 (L) 82 - 98 fL    Mean Corpuscular Hemoglobin 25.1 (L) 27.0 - 31.0 pg    Mean Corpuscular Hemoglobin Conc 33.2 32.0 - 36.0 g/dL    RDW 13.8 11.5 - 14.5 %    Platelets 166 150 - 350 K/uL    MPV 10.5 9.2 - 12.9 fL    Immature Granulocytes 0.2 0.0 - 0.5 %    Gran # (ANC) 4.8 1.8 - 7.7 K/uL    Immature Grans (Abs) 0.01 0.00 - 0.04 K/uL    Lymph # 0.6 (L) 1.0 - 4.8 K/uL    Mono # 0.8 0.3 - 1.0 K/uL    Eos # 0.0 0.0 - 0.5 K/uL    Baso # 0.04 0.00 - 0.20 K/uL    nRBC 0 0 /100 WBC    Gran% 76.5 (H) 38.0 - 73.0 %    Lymph% 9.6 (L) 18.0 - 48.0 %    Mono% 12.5 4.0 - 15.0 %    Eosinophil% 0.6 0.0 - 8.0 %    Basophil% 0.6 0.0 - 1.9 %    Differential Method Automated    POCT glucose    Collection Time: 08/22/20  9:44 AM   Result Value Ref Range    POCT Glucose 147 (H) 70 - 110 mg/dL   POCT glucose    Collection Time: 08/22/20  4:24 PM   Result Value  Ref Range    POCT Glucose 147 (H) 70 - 110 mg/dL   ]    I/O last 3 completed shifts:  In: 500 [P.O.:500]  Out: -     Vitals:    08/22/20 1330 08/22/20 1400 08/22/20 1500 08/22/20 1547   BP: (!) 138/56 128/72 131/68 116/60   Pulse: 92 82 99 97   Resp:   18 18   Temp:   98.5 °F (36.9 °C) 98.7 °F (37.1 °C)   TempSrc:   Oral Oral   SpO2:    99%   Weight:       Height:           No Jvd, Thyromegaly or Lymphadenopathy  Lungs: Fairly clear anteriorly and laterally  Cor: RRR no G or rubs  Abd: Soft benign good bowel sounds non tender  Ext: pos edema    A)    Nephrotic snd (with smallish renal shadows on US), likely diabetic in nature.   Anemia on epo  BPH (co inability to void better)  CKD4 creat continue to rise (not a good sign)  Hyperpth on binders and vitd analogs  DM  HTN  Obesity  Hyperpth     P)  Renal Diet  Home meds  Protect L arm for future access  EPO  Binders  Adjust all meds to the degree of renal fx  Close follow up I/O and weights  Maintain Hydration   Dialysis TTS

## 2020-08-22 NOTE — NURSING
Critcal value Calcium called into Dr Negro- no new orders. Patient rested between care- stable hours

## 2020-08-22 NOTE — SUBJECTIVE & OBJECTIVE
Interval History: Doing well on dialysis.    Review of Systems   HENT: Negative for ear discharge and ear pain.    Eyes: Negative for discharge and itching.   Endocrine: Negative for cold intolerance and heat intolerance.   Neurological: Negative for seizures and syncope.     Objective:     Vital Signs (Most Recent):  Temp: 97.8 °F (36.6 °C) (08/22/20 0832)  Pulse: 91 (08/22/20 0832)  Resp: 18 (08/22/20 0832)  BP: (!) 145/67 (08/22/20 0832)  SpO2: 98 % (08/22/20 0907) Vital Signs (24h Range):  Temp:  [97.8 °F (36.6 °C)-98.5 °F (36.9 °C)] 97.8 °F (36.6 °C)  Pulse:  [82-91] 91  Resp:  [18] 18  SpO2:  [97 %-99 %] 98 %  BP: (114-145)/(56-67) 145/67     Weight: 98 kg (216 lb 0.8 oz)  Body mass index is 32.85 kg/m².    Intake/Output Summary (Last 24 hours) at 8/22/2020 1158  Last data filed at 8/21/2020 1900  Gross per 24 hour   Intake 400 ml   Output --   Net 400 ml      Physical Exam  Vitals signs and nursing note reviewed.   Constitutional:       General: He is not in acute distress.     Appearance: He is well-developed.   HENT:      Head: Normocephalic and atraumatic.      Right Ear: External ear normal.      Left Ear: External ear normal.      Nose: Nose normal.   Eyes:      Conjunctiva/sclera: Conjunctivae normal.      Pupils: Pupils are equal, round, and reactive to light.   Neck:      Musculoskeletal: Normal range of motion and neck supple.   Cardiovascular:      Rate and Rhythm: Normal rate and regular rhythm.   Pulmonary:      Effort: Pulmonary effort is normal. No respiratory distress.      Breath sounds: No wheezing.   Abdominal:      General: Bowel sounds are normal. There is no distension.      Palpations: Abdomen is soft.      Tenderness: There is no abdominal tenderness.      Comments: No palpable hepatomegaly or splenomegaly   Musculoskeletal:         General: Swelling present. No tenderness.   Skin:     General: Skin is warm and dry.      Comments: Swelling and pitting edema up to abdomen and flank    Neurological:      Mental Status: He is alert and oriented to person, place, and time.      Motor: Weakness present.   Psychiatric:         Thought Content: Thought content normal.         Significant Labs:   BMP:   Recent Labs   Lab 08/22/20  0517   *   *   K 5.0      CO2 19*   BUN 59*   CREATININE 6.1*   CALCIUM 6.9*     CBC:   Recent Labs   Lab 08/22/20  0517   WBC 6.22   HGB 8.2*   HCT 24.7*          Significant Imaging: I have reviewed all pertinent imaging results/findings within the past 24 hours.

## 2020-08-22 NOTE — ASSESSMENT & PLAN NOTE
Stable, no acute issue, continue plavix/statin.  PT/OT evaluation.  Initial recommendation for SNF

## 2020-08-22 NOTE — NURSING
Received bedside handoff from [place]. Discussed plan of care, pain management and safety measures with patient. Patient states resting comfortably at present time.

## 2020-08-22 NOTE — PROGRESS NOTES
08/22/20 1435   Post-Hemodialysis Assessment   Rinseback Volume (mL) 250 mL   Blood Volume Processed (Liters) 60.5 L   Dialyzer Clearance Lightly streaked   Duration of Treatment (minutes) 240 minutes   Hemodialysis Intake (mL) 500 mL   Total UF (mL) 2000 mL   Net Fluid Removal 1500   Patient Response to Treatment tolerated well   Post-Treatment Weight 95.7 kg (210 lb 15.7 oz)   Treatment Weight Change -1.1   Post-Hemodialysis Comments  Tx. terminated, blood returned

## 2020-08-22 NOTE — PROGRESS NOTES
[facility] Hospital Medicine  Progress Note    Patient Name: Miguel Moore  MRN: [id number]  Patient Class: IP- Inpatient   Admission Date: 8/15/2020  Length of Stay: 6 days  Attending Physician: Barrie Negro MD  Primary Care Provider: Raciel Raymond MD        Subjective:     Principal Problem:Nephrotic syndrome        HPI:  65 y.o. 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  States he was called on 8/13/2020, but did not come to the ED until today.  He reports mild edema and weight gain of 1-2 lb.  Denies fever, chills, cough, SOB, chest pain, palpitations, orthopnea, PND, dizziness, syncope, n/v/d, abd pain, or dysuria.  In the ED, Cr/BUN 5.3/64, K+ 5.4, H/H 7.5/22.7, CPK 1262, , UA shows proteinura 3+, renal US suggest chronic medical renal disease.  His Nephrologist is Dr. Roa, last visit Oct 2019.  Nephrology consult.  Placed in observation.    Overview/Hospital Course:  65 y.o. AAM with history significant for DMII, HTN, stroke, and CKD4 presents for evaluation of worsening CKD sent in by provider. Creatine = 2.0 1 year ago now with creatine 5.4 and potassium 5.8 worsening swelling and anasarca 2-3+ edema up to abdomen and flank, activity intolerance and orthopnea X 5 pillows. He is not on hemodialysis.  Amlodipine stopped.  Worsening BP and started on Hydralazine.  Nephrology consulted.  BP improved, but worsening Creat with persistent hyperkalemia.  Patient will need HD.  IR consulted for HD catheter.  Temporary HD catheter placed on 8/20 and patient underwent HD.  Catheter switched to tunneled catheter on 8/21.    Interval History: Doing well on dialysis.    Review of Systems   HENT: Negative for ear discharge and ear pain.    Eyes: Negative for discharge and itching.    Endocrine: Negative for cold intolerance and heat intolerance.   Neurological: Negative for seizures and syncope.     Objective:     Vital Signs (Most Recent):  Temp: 97.8 °F (36.6 °C) (08/22/20 0832)  Pulse: 91 (08/22/20 0832)  Resp: 18 (08/22/20 0832)  BP: (!) 145/67 (08/22/20 0832)  SpO2: 98 % (08/22/20 0907) Vital Signs (24h Range):  Temp:  [97.8 °F (36.6 °C)-98.5 °F (36.9 °C)] 97.8 °F (36.6 °C)  Pulse:  [82-91] 91  Resp:  [18] 18  SpO2:  [97 %-99 %] 98 %  BP: (114-145)/(56-67) 145/67     Weight: 98 kg (216 lb 0.8 oz)  Body mass index is 32.85 kg/m².    Intake/Output Summary (Last 24 hours) at 8/22/2020 1158  Last data filed at 8/21/2020 1900  Gross per 24 hour   Intake 400 ml   Output --   Net 400 ml      Physical Exam  Vitals signs and nursing note reviewed.   Constitutional:       General: He is not in acute distress.     Appearance: He is well-developed.   HENT:      Head: Normocephalic and atraumatic.      Right Ear: External ear normal.      Left Ear: External ear normal.      Nose: Nose normal.   Eyes:      Conjunctiva/sclera: Conjunctivae normal.      Pupils: Pupils are equal, round, and reactive to light.   Neck:      Musculoskeletal: Normal range of motion and neck supple.   Cardiovascular:      Rate and Rhythm: Normal rate and regular rhythm.   Pulmonary:      Effort: Pulmonary effort is normal. No respiratory distress.      Breath sounds: No wheezing.   Abdominal:      General: Bowel sounds are normal. There is no distension.      Palpations: Abdomen is soft.      Tenderness: There is no abdominal tenderness.      Comments: No palpable hepatomegaly or splenomegaly   Musculoskeletal:         General: Swelling present. No tenderness.   Skin:     General: Skin is warm and dry.      Comments: Swelling and pitting edema up to abdomen and flank   Neurological:      Mental Status: He is alert and oriented to person, place, and time.      Motor: Weakness present.   Psychiatric:         Thought Content:  Thought content normal.         Significant Labs:   BMP:   Recent Labs   Lab 08/22/20  0517   *   *   K 5.0      CO2 19*   BUN 59*   CREATININE 6.1*   CALCIUM 6.9*     CBC:   Recent Labs   Lab 08/22/20  0517   WBC 6.22   HGB 8.2*   HCT 24.7*          Significant Imaging: I have reviewed all pertinent imaging results/findings within the past 24 hours.      Assessment/Plan:      * Nephrotic syndrome  Patient with profound edema - anasarca up to abdomen - 2-3+ pitting after overnight diuresis + 3+ proteinuria - CKD stage 4 worsened from creatine baseline up to creatine of around 3-->5.8, severe activity intolerance - Nephrology consulted and following - diureses, strict I&Os, daily weight  -stop amlodipine - added labetalol per nephrology.  Also started on Hydralazine.  Increasing Creat with persistent hyperkalemia.  HD catheter placed and patient underwent HD on 8/20.  Further HD per Nephrology.    Anemia  Anemia of CKD  H/H stable since admission with no evidence of bleeding.        Enlarged prostate  Continue home flomax - voiding well - strict I&Os      Hypocalcemia  Started on calcitrol 0.25 mcg and calcium acetate 667 po tid AC    Anasarca  See above      Type 2 diabetes mellitus with diabetic neuropathy, with long-term current use of insulin        CKD (chronic kidney disease) stage 4, GFR 15-29 ml/min with proteinuria  Acute on chronic CKD stage 4  Appreciate Nephrology input.  As above.    Hemiplegia and hemiparesis following cerebral infarction affecting right dominant side  Stable, no acute issue, continue plavix/statin.  PT/OT evaluation.  Initial recommendation for SNF    Seizure disorder as sequela of cerebrovascular accident  Continue home regimen of phenytoin    Benign non-nodular prostatic hyperplasia without lower urinary tract symptoms  Continue home regimen of tamsulosin     Controlled type 2 diabetes mellitus with stage 4 chronic kidney disease, with long-term current use  of insulin  Last HgbA1c   Lab Results   Component Value Date    HGBA1C 5.5 08/13/2020     Hold oral antihyperglycemics while inpatient  PRN sliding scale insulin  ACHS glucose monitoring   ADA diet     Pure hypercholesterolemia  Continue statin    Benign essential HTN; ACEI hyperK  Uncontrolled.  Norvasc stopped with lower extremity edema.  Started on Hydralazine.  Increase as needed.  Improving.      VTE Risk Mitigation (From admission, onward)         Ordered     heparin (porcine) injection 5,000 Units  As needed (PRN)      08/20/20 1251     IP VTE HIGH RISK PATIENT  Once      08/15/20 1505     Place sequential compression device  Until discontinued      08/15/20 1505                Discharge Planning   GARY:      Code Status: Full Code   Is the patient medically ready for discharge?:     Reason for patient still in hospital (select all that apply): Patient trending condition  Discharge Plan A: Skilled Nursing Facility   Discharge Delays: None known at this time              Barrie Negro MD  Department of Hospital Medicine   [facility]

## 2020-08-23 LAB
ALBUMIN SERPL BCP-MCNC: 2.3 G/DL (ref 3.5–5.2)
ALP SERPL-CCNC: 92 U/L (ref 55–135)
ALT SERPL W/O P-5'-P-CCNC: 22 U/L (ref 10–44)
ANION GAP SERPL CALC-SCNC: 11 MMOL/L (ref 8–16)
AST SERPL-CCNC: 16 U/L (ref 10–40)
BILIRUB SERPL-MCNC: 0.2 MG/DL (ref 0.1–1)
BUN SERPL-MCNC: 42 MG/DL (ref 8–23)
CALCIUM SERPL-MCNC: 7 MG/DL (ref 8.7–10.5)
CHLORIDE SERPL-SCNC: 100 MMOL/L (ref 95–110)
CO2 SERPL-SCNC: 21 MMOL/L (ref 23–29)
CREAT SERPL-MCNC: 4.8 MG/DL (ref 0.5–1.4)
EST. GFR  (AFRICAN AMERICAN): 14 ML/MIN/1.73 M^2
EST. GFR  (NON AFRICAN AMERICAN): 12 ML/MIN/1.73 M^2
GLUCOSE SERPL-MCNC: 161 MG/DL (ref 70–110)
POCT GLUCOSE: 146 MG/DL (ref 70–110)
POCT GLUCOSE: 151 MG/DL (ref 70–110)
POCT GLUCOSE: 154 MG/DL (ref 70–110)
POTASSIUM SERPL-SCNC: 4.7 MMOL/L (ref 3.5–5.1)
PROT SERPL-MCNC: 5.7 G/DL (ref 6–8.4)
SODIUM SERPL-SCNC: 132 MMOL/L (ref 136–145)

## 2020-08-23 PROCEDURE — 25000003 PHARM REV CODE 250: Performed by: INTERNAL MEDICINE

## 2020-08-23 PROCEDURE — 97530 THERAPEUTIC ACTIVITIES: CPT | Mod: CQ

## 2020-08-23 PROCEDURE — 25000003 PHARM REV CODE 250: Performed by: HOSPITALIST

## 2020-08-23 PROCEDURE — 94761 N-INVAS EAR/PLS OXIMETRY MLT: CPT

## 2020-08-23 PROCEDURE — 80053 COMPREHEN METABOLIC PANEL: CPT

## 2020-08-23 PROCEDURE — 21400001 HC TELEMETRY ROOM

## 2020-08-23 PROCEDURE — 63600175 PHARM REV CODE 636 W HCPCS: Performed by: INTERNAL MEDICINE

## 2020-08-23 PROCEDURE — 36415 COLL VENOUS BLD VENIPUNCTURE: CPT

## 2020-08-23 RX ADMIN — PHENYTOIN SODIUM 100 MG: 100 CAPSULE ORAL at 09:08

## 2020-08-23 RX ADMIN — FUROSEMIDE 40 MG: 10 INJECTION, SOLUTION INTRAVENOUS at 05:08

## 2020-08-23 RX ADMIN — PHENYTOIN SODIUM 100 MG: 100 CAPSULE ORAL at 04:08

## 2020-08-23 RX ADMIN — MUPIROCIN: 20 OINTMENT TOPICAL at 09:08

## 2020-08-23 RX ADMIN — MAGNESIUM OXIDE TAB 400 MG (241.3 MG ELEMENTAL MG) 400 MG: 400 (241.3 MG) TAB at 09:08

## 2020-08-23 RX ADMIN — FUROSEMIDE 40 MG: 10 INJECTION, SOLUTION INTRAVENOUS at 04:08

## 2020-08-23 RX ADMIN — CLOPIDOGREL 75 MG: 75 TABLET, FILM COATED ORAL at 09:08

## 2020-08-23 RX ADMIN — INSULIN ASPART 1 UNITS: 100 INJECTION, SOLUTION INTRAVENOUS; SUBCUTANEOUS at 09:08

## 2020-08-23 RX ADMIN — DIAZEPAM 2 MG: 2 TABLET ORAL at 09:08

## 2020-08-23 RX ADMIN — TAMSULOSIN HYDROCHLORIDE 0.8 MG: 0.4 CAPSULE ORAL at 09:08

## 2020-08-23 RX ADMIN — HYDRALAZINE HYDROCHLORIDE 25 MG: 25 TABLET, FILM COATED ORAL at 09:08

## 2020-08-23 RX ADMIN — LABETALOL HYDROCHLORIDE 100 MG: 100 TABLET, FILM COATED ORAL at 09:08

## 2020-08-23 RX ADMIN — HYDRALAZINE HYDROCHLORIDE 25 MG: 25 TABLET, FILM COATED ORAL at 06:08

## 2020-08-23 RX ADMIN — ATORVASTATIN CALCIUM 80 MG: 40 TABLET, FILM COATED ORAL at 09:08

## 2020-08-23 RX ADMIN — CALCIUM ACETATE 667 MG: 667 CAPSULE ORAL at 04:08

## 2020-08-23 RX ADMIN — CALCITRIOL CAPSULES 0.25 MCG 0.25 MCG: 0.25 CAPSULE ORAL at 09:08

## 2020-08-23 RX ADMIN — CALCIUM ACETATE 667 MG: 667 CAPSULE ORAL at 09:08

## 2020-08-23 RX ADMIN — CALCIUM ACETATE 667 MG: 667 CAPSULE ORAL at 01:08

## 2020-08-23 NOTE — ASSESSMENT & PLAN NOTE
Patient with profound edema - anasarca up to abdomen - 2-3+ pitting after overnight diuresis + 3+ proteinuria - CKD stage 4 worsened from creatine baseline up to creatine of around 3-->5.8, severe activity intolerance - Nephrology consulted and following - diureses, strict I&Os, daily weight  -stop amlodipine - added labetalol per nephrology.  Also started on Hydralazine.  Increasing Creat with persistent hyperkalemia.  HD catheter placed and patient underwent HD on 8/20.  Will need outpatient HD arrangements.  SW consulted.

## 2020-08-23 NOTE — ASSESSMENT & PLAN NOTE
Initially uncontrolled.  Norvasc stopped with lower extremity edema.  Started on Hydralazine.  Increase as needed.  Improving.

## 2020-08-23 NOTE — SUBJECTIVE & OBJECTIVE
Interval History: No new issues overnight.    Review of Systems   HENT: Negative for ear discharge and ear pain.    Eyes: Negative for discharge and itching.   Endocrine: Negative for cold intolerance and heat intolerance.   Neurological: Negative for seizures and syncope.     Objective:     Vital Signs (Most Recent):  Temp: 98.7 °F (37.1 °C) (08/23/20 0739)  Pulse: 86 (08/23/20 0739)  Resp: 17 (08/23/20 0739)  BP: 128/63 (08/23/20 0739)  SpO2: 99 % (08/23/20 0824) Vital Signs (24h Range):  Temp:  [98.3 °F (36.8 °C)-98.8 °F (37.1 °C)] 98.7 °F (37.1 °C)  Pulse:  [] 86  Resp:  [16-20] 17  SpO2:  [99 %-100 %] 99 %  BP: (116-149)/(42-72) 128/63     Weight: 98 kg (216 lb 0.8 oz)  Body mass index is 32.85 kg/m².    Intake/Output Summary (Last 24 hours) at 8/23/2020 1007  Last data filed at 8/22/2020 1800  Gross per 24 hour   Intake 700 ml   Output 2000 ml   Net -1300 ml      Physical Exam  Vitals signs and nursing note reviewed.   Constitutional:       General: He is not in acute distress.     Appearance: He is well-developed.   HENT:      Head: Normocephalic and atraumatic.      Right Ear: External ear normal.      Left Ear: External ear normal.      Nose: Nose normal.   Eyes:      Conjunctiva/sclera: Conjunctivae normal.      Pupils: Pupils are equal, round, and reactive to light.   Neck:      Musculoskeletal: Normal range of motion and neck supple.   Cardiovascular:      Rate and Rhythm: Normal rate and regular rhythm.   Pulmonary:      Effort: Pulmonary effort is normal. No respiratory distress.      Breath sounds: No wheezing.   Abdominal:      General: Bowel sounds are normal. There is no distension.      Palpations: Abdomen is soft.      Tenderness: There is no abdominal tenderness.      Comments: No palpable hepatomegaly or splenomegaly   Musculoskeletal:         General: Swelling present. No tenderness.   Skin:     General: Skin is warm and dry.      Comments: Swelling and pitting edema up to abdomen and  flank   Neurological:      Mental Status: He is alert and oriented to person, place, and time.      Comments: Right sided hemiplegia   Psychiatric:         Thought Content: Thought content normal.         Significant Labs:   BMP:   Recent Labs   Lab 08/23/20  0512   *   *   K 4.7      CO2 21*   BUN 42*   CREATININE 4.8*   CALCIUM 7.0*     CBC:   Recent Labs   Lab 08/22/20  0517   WBC 6.22   HGB 8.2*   HCT 24.7*          Significant Imaging: I have reviewed all pertinent imaging results/findings within the past 24 hours.

## 2020-08-23 NOTE — PROGRESS NOTES
[facility] Hospital Medicine  Progress Note    Patient Name: Miguel Moore  MRN: [id number]  Patient Class: IP- Inpatient   Admission Date: 8/15/2020  Length of Stay: 7 days  Attending Physician: Barrie Negro MD  Primary Care Provider: Raciel Raymond MD        Subjective:     Principal Problem:Nephrotic syndrome        HPI:  65 y.o. 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  States he was called on 8/13/2020, but did not come to the ED until today.  He reports mild edema and weight gain of 1-2 lb.  Denies fever, chills, cough, SOB, chest pain, palpitations, orthopnea, PND, dizziness, syncope, n/v/d, abd pain, or dysuria.  In the ED, Cr/BUN 5.3/64, K+ 5.4, H/H 7.5/22.7, CPK 1262, , UA shows proteinura 3+, renal US suggest chronic medical renal disease.  His Nephrologist is Dr. Roa, last visit Oct 2019.  Nephrology consult.  Placed in observation.    Overview/Hospital Course:  65 y.o. AAM with history significant for DMII, HTN, stroke, and CKD4 presents for evaluation of worsening CKD sent in by provider. Creatine = 2.0 1 year ago now with creatine 5.4 and potassium 5.8 worsening swelling and anasarca 2-3+ edema up to abdomen and flank, activity intolerance and orthopnea X 5 pillows. He is not on hemodialysis.  Amlodipine stopped.  Worsening BP and started on Hydralazine.  Nephrology consulted.  BP improved, but worsening Creat with persistent hyperkalemia.  Patient will need HD.  IR consulted for HD catheter.  Temporary HD catheter placed on 8/20 and patient underwent HD.  Catheter switched to tunneled catheter on 8/21.  Patient with right sided hemiplegia from previous stroke.  Deconditioned and PT/OT consulted.  Initial recommendation for SNF.  Patient will need outpatient HD  arrangements and SW consulted.    Interval History: No new issues overnight.    Review of Systems   HENT: Negative for ear discharge and ear pain.    Eyes: Negative for discharge and itching.   Endocrine: Negative for cold intolerance and heat intolerance.   Neurological: Negative for seizures and syncope.     Objective:     Vital Signs (Most Recent):  Temp: 98.7 °F (37.1 °C) (08/23/20 0739)  Pulse: 86 (08/23/20 0739)  Resp: 17 (08/23/20 0739)  BP: 128/63 (08/23/20 0739)  SpO2: 99 % (08/23/20 0824) Vital Signs (24h Range):  Temp:  [98.3 °F (36.8 °C)-98.8 °F (37.1 °C)] 98.7 °F (37.1 °C)  Pulse:  [] 86  Resp:  [16-20] 17  SpO2:  [99 %-100 %] 99 %  BP: (116-149)/(42-72) 128/63     Weight: 98 kg (216 lb 0.8 oz)  Body mass index is 32.85 kg/m².    Intake/Output Summary (Last 24 hours) at 8/23/2020 1007  Last data filed at 8/22/2020 1800  Gross per 24 hour   Intake 700 ml   Output 2000 ml   Net -1300 ml      Physical Exam  Vitals signs and nursing note reviewed.   Constitutional:       General: He is not in acute distress.     Appearance: He is well-developed.   HENT:      Head: Normocephalic and atraumatic.      Right Ear: External ear normal.      Left Ear: External ear normal.      Nose: Nose normal.   Eyes:      Conjunctiva/sclera: Conjunctivae normal.      Pupils: Pupils are equal, round, and reactive to light.   Neck:      Musculoskeletal: Normal range of motion and neck supple.   Cardiovascular:      Rate and Rhythm: Normal rate and regular rhythm.   Pulmonary:      Effort: Pulmonary effort is normal. No respiratory distress.      Breath sounds: No wheezing.   Abdominal:      General: Bowel sounds are normal. There is no distension.      Palpations: Abdomen is soft.      Tenderness: There is no abdominal tenderness.      Comments: No palpable hepatomegaly or splenomegaly   Musculoskeletal:         General: Swelling present. No tenderness.   Skin:     General: Skin is warm and dry.      Comments: Swelling and  pitting edema up to abdomen and flank   Neurological:      Mental Status: He is alert and oriented to person, place, and time.      Comments: Right sided hemiplegia   Psychiatric:         Thought Content: Thought content normal.         Significant Labs:   BMP:   Recent Labs   Lab 08/23/20  0512   *   *   K 4.7      CO2 21*   BUN 42*   CREATININE 4.8*   CALCIUM 7.0*     CBC:   Recent Labs   Lab 08/22/20  0517   WBC 6.22   HGB 8.2*   HCT 24.7*          Significant Imaging: I have reviewed all pertinent imaging results/findings within the past 24 hours.      Assessment/Plan:      * Nephrotic syndrome  Patient with profound edema - anasarca up to abdomen - 2-3+ pitting after overnight diuresis + 3+ proteinuria - CKD stage 4 worsened from creatine baseline up to creatine of around 3-->5.8, severe activity intolerance - Nephrology consulted and following - diureses, strict I&Os, daily weight  -stop amlodipine - added labetalol per nephrology.  Also started on Hydralazine.  Increasing Creat with persistent hyperkalemia.  HD catheter placed and patient underwent HD on 8/20.  Will need outpatient HD arrangements.  SW consulted.    Anemia  Anemia of CKD  H/H stable since admission with no evidence of bleeding.        Enlarged prostate  Continue home flomax - voiding well - strict I&Os      Hypocalcemia  Started on calcitrol 0.25 mcg and calcium acetate 667 po tid AC    Anasarca  See above      Type 2 diabetes mellitus with diabetic neuropathy, with long-term current use of insulin        CKD (chronic kidney disease) stage 4, GFR 15-29 ml/min with proteinuria  Acute on chronic CKD stage 4  Appreciate Nephrology input.  As above.    Hemiplegia and hemiparesis following cerebral infarction affecting right dominant side  Stable, no acute issue, continue plavix/statin.  PT/OT evaluation.  Initial recommendation for SNF    Seizure disorder as sequela of cerebrovascular accident  Continue home regimen of  phenytoin  Will check level in Am.    Benign non-nodular prostatic hyperplasia without lower urinary tract symptoms  Continue home regimen of tamsulosin     Controlled type 2 diabetes mellitus with stage 4 chronic kidney disease, with long-term current use of insulin  Last HgbA1c   Lab Results   Component Value Date    HGBA1C 5.5 08/13/2020     Hold oral antihyperglycemics while inpatient  PRN sliding scale insulin  ACHS glucose monitoring   ADA diet     Pure hypercholesterolemia  Continue statin    Benign essential HTN; ACEI hyperK  Initially uncontrolled.  Norvasc stopped with lower extremity edema.  Started on Hydralazine.  Increase as needed.  Improving.      VTE Risk Mitigation (From admission, onward)         Ordered     heparin (porcine) injection 5,000 Units  As needed (PRN)      08/20/20 1251     IP VTE HIGH RISK PATIENT  Once      08/15/20 1505     Place sequential compression device  Until discontinued      08/15/20 1505                Discharge Planning   GARY:      Code Status: Full Code   Is the patient medically ready for discharge?:     Reason for patient still in hospital (select all that apply): Patient trending condition  Discharge Plan A: Skilled Nursing Facility   Discharge Delays: None known at this time              Barrie Negro MD  Department of Hospital Medicine   [facility]

## 2020-08-23 NOTE — ASSESSMENT & PLAN NOTE
Continue statin   Non-Graft Cartilage Fenestration Text: The cartilage was fenestrated with a 2mm punch biopsy to help facilitate healing.

## 2020-08-23 NOTE — PROGRESS NOTES
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 aTy    CATARACT EXTRACTION W/  INTRAOCULAR LENS IMPLANT Left 10/19/2017    Dr. Tay    ESOPHAGOGASTRODUODENOSCOPY N/A 8/17/2020    Procedure: EGD (ESOPHAGOGASTRODUODENOSCOPY);  Surgeon: Desmond Chapman MD;  Location: [facility];  Service: Endoscopy;  Laterality: N/A;    FEMORAL ARTERY STENT      KNEE SURGERY      bilateral scope     Review of patient's allergies indicates:   Allergen Reactions    Ace inhibitors      Hyperkalemia 8/2018    Penicillins Hives    Tizanidine      Felt hot       Current Facility-Administered Medications   Medication    acetaminophen tablet 650 mg    atorvastatin tablet 80 mg    calcitRIOL capsule 0.25 mcg    calcium acetate(phosphat bind) capsule 667 mg    clopidogreL tablet 75 mg    dextrose 50% injection 12.5 g    dextrose 50% injection 25 g    diazePAM tablet 2 mg    epoetin marisol-epbx injection 10,000 Units    furosemide injection 40 mg    glucagon (human recombinant) injection 1 mg    glucose chewable tablet 16 g    glucose chewable tablet 24 g    heparin (porcine) injection 5,000 Units    hydrALAZINE injection 10 mg    hydrALAZINE tablet 25 mg    insulin aspart U-100 pen 1-10 Units    labetaloL tablet 100 mg    magnesium oxide tablet 400 mg    melatonin tablet 6 mg    metoprolol injection 5 mg    mupirocin 2 % ointment    phenytoin (DILANTIN) ER capsule 100 mg    sodium chloride 0.9% flush 10 mL    sodium chloride 0.9% flush 10 mL    tamsulosin 24 hr capsule 0.8 mg    traZODone tablet 50 mg       LABS    Recent Results  (from the past 24 hour(s))   POCT glucose    Collection Time: 08/22/20  4:24 PM   Result Value Ref Range    POCT Glucose 147 (H) 70 - 110 mg/dL   POCT glucose    Collection Time: 08/22/20  8:55 PM   Result Value Ref Range    POCT Glucose 207 (H) 70 - 110 mg/dL   Comprehensive metabolic panel    Collection Time: 08/23/20  5:12 AM   Result Value Ref Range    Sodium 132 (L) 136 - 145 mmol/L    Potassium 4.7 3.5 - 5.1 mmol/L    Chloride 100 95 - 110 mmol/L    CO2 21 (L) 23 - 29 mmol/L    Glucose 161 (H) 70 - 110 mg/dL    BUN, Bld 42 (H) 8 - 23 mg/dL    Creatinine 4.8 (H) 0.5 - 1.4 mg/dL    Calcium 7.0 (L) 8.7 - 10.5 mg/dL    Total Protein 5.7 (L) 6.0 - 8.4 g/dL    Albumin 2.3 (L) 3.5 - 5.2 g/dL    Total Bilirubin 0.2 0.1 - 1.0 mg/dL    Alkaline Phosphatase 92 55 - 135 U/L    AST 16 10 - 40 U/L    ALT 22 10 - 44 U/L    Anion Gap 11 8 - 16 mmol/L    eGFR if African American 14 (A) >60 mL/min/1.73 m^2    eGFR if non African American 12 (A) >60 mL/min/1.73 m^2   POCT glucose    Collection Time: 08/23/20  7:40 AM   Result Value Ref Range    POCT Glucose 146 (H) 70 - 110 mg/dL   POCT glucose    Collection Time: 08/23/20 11:14 AM   Result Value Ref Range    POCT Glucose 154 (H) 70 - 110 mg/dL   ]    I/O last 3 completed shifts:  In: 900 [P.O.:400; Other:500]  Out: 2000 [Other:2000]    Vitals:    08/23/20 0739 08/23/20 0824 08/23/20 1113 08/23/20 1510   BP: 128/63  (!) 122/56 115/62   Pulse: 86  85 89   Resp: 17 17 17   Temp: 98.7 °F (37.1 °C)  99 °F (37.2 °C) 98.8 °F (37.1 °C)   TempSrc: Oral  Oral    SpO2: 99% 99% 98% 99%   Weight:       Height:           No Jvd, Thyromegaly or Lymphadenopathy  Lungs: Fairly clear anteriorly and laterally  Cor: RRR no G or rubs  Abd: Soft benign good bowel sounds non tender  Ext: Pos edema    A)    Nephrotic snd (with smallish renal shadows on US), likely diabetic in nature.   Anemia on epo  BPH (co inability to void better)  CKD4 creat continue to rise (not a good sign)  Hyperpth on binders  and vitd analogs  DM  HTN  Obesity  Hyperpth     P)  Renal Diet  Home meds  Protect L arm for future access  EPO  Binders  Adjust all meds to the degree of renal fx  Close follow up I/O and weights  Maintain Hydration   Dialysis in am (in prep for storm)

## 2020-08-24 LAB
ALBUMIN SERPL BCP-MCNC: 2.3 G/DL (ref 3.5–5.2)
ALP SERPL-CCNC: 88 U/L (ref 55–135)
ALT SERPL W/O P-5'-P-CCNC: 17 U/L (ref 10–44)
ANION GAP SERPL CALC-SCNC: 13 MMOL/L (ref 8–16)
AST SERPL-CCNC: 15 U/L (ref 10–40)
BASOPHILS # BLD AUTO: 0.02 K/UL (ref 0–0.2)
BASOPHILS NFR BLD: 0.2 % (ref 0–1.9)
BILIRUB SERPL-MCNC: 0.2 MG/DL (ref 0.1–1)
BUN SERPL-MCNC: 50 MG/DL (ref 8–23)
CALCIUM SERPL-MCNC: 7.1 MG/DL (ref 8.7–10.5)
CHLORIDE SERPL-SCNC: 100 MMOL/L (ref 95–110)
CO2 SERPL-SCNC: 20 MMOL/L (ref 23–29)
CREAT SERPL-MCNC: 5.7 MG/DL (ref 0.5–1.4)
DIFFERENTIAL METHOD: ABNORMAL
EOSINOPHIL # BLD AUTO: 0 K/UL (ref 0–0.5)
EOSINOPHIL NFR BLD: 0.4 % (ref 0–8)
ERYTHROCYTE [DISTWIDTH] IN BLOOD BY AUTOMATED COUNT: 13.5 % (ref 11.5–14.5)
EST. GFR  (AFRICAN AMERICAN): 11 ML/MIN/1.73 M^2
EST. GFR  (NON AFRICAN AMERICAN): 10 ML/MIN/1.73 M^2
GLUCOSE SERPL-MCNC: 153 MG/DL (ref 70–110)
HBV SURFACE AB SER QL IA: NEGATIVE
HBV SURFACE AB SERPL IA-ACNC: <3 MIU/ML
HCT VFR BLD AUTO: 26.5 % (ref 40–54)
HGB BLD-MCNC: 8.7 G/DL (ref 14–18)
IMM GRANULOCYTES # BLD AUTO: 0.05 K/UL (ref 0–0.04)
IMM GRANULOCYTES NFR BLD AUTO: 0.6 % (ref 0–0.5)
LYMPHOCYTES # BLD AUTO: 0.3 K/UL (ref 1–4.8)
LYMPHOCYTES NFR BLD: 3.3 % (ref 18–48)
MCH RBC QN AUTO: 24.9 PG (ref 27–31)
MCHC RBC AUTO-ENTMCNC: 32.8 G/DL (ref 32–36)
MCV RBC AUTO: 76 FL (ref 82–98)
MONOCYTES # BLD AUTO: 0.7 K/UL (ref 0.3–1)
MONOCYTES NFR BLD: 8.1 % (ref 4–15)
NEUTROPHILS # BLD AUTO: 7.9 K/UL (ref 1.8–7.7)
NEUTROPHILS NFR BLD: 87.4 % (ref 38–73)
NRBC BLD-RTO: 0 /100 WBC
PHENYTOIN SERPL-MCNC: 4.5 UG/ML (ref 10–20)
PLATELET # BLD AUTO: 160 K/UL (ref 150–350)
PMV BLD AUTO: 9.8 FL (ref 9.2–12.9)
POCT GLUCOSE: 180 MG/DL (ref 70–110)
POCT GLUCOSE: 201 MG/DL (ref 70–110)
POCT GLUCOSE: 201 MG/DL (ref 70–110)
POTASSIUM SERPL-SCNC: 4.8 MMOL/L (ref 3.5–5.1)
PROT SERPL-MCNC: 5.8 G/DL (ref 6–8.4)
RBC # BLD AUTO: 3.49 M/UL (ref 4.6–6.2)
SARS-COV-2 RDRP RESP QL NAA+PROBE: NEGATIVE
SODIUM SERPL-SCNC: 133 MMOL/L (ref 136–145)
WBC # BLD AUTO: 8.99 K/UL (ref 3.9–12.7)

## 2020-08-24 PROCEDURE — 25000003 PHARM REV CODE 250: Performed by: INTERNAL MEDICINE

## 2020-08-24 PROCEDURE — 80185 ASSAY OF PHENYTOIN TOTAL: CPT

## 2020-08-24 PROCEDURE — 97110 THERAPEUTIC EXERCISES: CPT | Mod: CQ

## 2020-08-24 PROCEDURE — 97530 THERAPEUTIC ACTIVITIES: CPT | Mod: CQ

## 2020-08-24 PROCEDURE — 30200315 PPD INTRADERMAL TEST REV CODE 302: Performed by: HOSPITALIST

## 2020-08-24 PROCEDURE — 80053 COMPREHEN METABOLIC PANEL: CPT

## 2020-08-24 PROCEDURE — 85025 COMPLETE CBC W/AUTO DIFF WBC: CPT

## 2020-08-24 PROCEDURE — 80100016 HC MAINTENANCE HEMODIALYSIS

## 2020-08-24 PROCEDURE — 63600175 PHARM REV CODE 636 W HCPCS: Performed by: INTERNAL MEDICINE

## 2020-08-24 PROCEDURE — 86704 HEP B CORE ANTIBODY TOTAL: CPT

## 2020-08-24 PROCEDURE — 25000003 PHARM REV CODE 250: Performed by: HOSPITALIST

## 2020-08-24 PROCEDURE — 86580 TB INTRADERMAL TEST: CPT | Performed by: HOSPITALIST

## 2020-08-24 PROCEDURE — 36415 COLL VENOUS BLD VENIPUNCTURE: CPT

## 2020-08-24 PROCEDURE — 21400001 HC TELEMETRY ROOM

## 2020-08-24 PROCEDURE — U0002 COVID-19 LAB TEST NON-CDC: HCPCS

## 2020-08-24 RX ORDER — TRAZODONE HYDROCHLORIDE 50 MG/1
50 TABLET ORAL NIGHTLY
Status: DISCONTINUED | OUTPATIENT
Start: 2020-08-24 | End: 2020-09-01 | Stop reason: HOSPADM

## 2020-08-24 RX ADMIN — HYDRALAZINE HYDROCHLORIDE 25 MG: 25 TABLET, FILM COATED ORAL at 04:08

## 2020-08-24 RX ADMIN — TRAZODONE HYDROCHLORIDE 50 MG: 50 TABLET ORAL at 09:08

## 2020-08-24 RX ADMIN — FUROSEMIDE 40 MG: 10 INJECTION, SOLUTION INTRAVENOUS at 04:08

## 2020-08-24 RX ADMIN — TUBERCULIN PURIFIED PROTEIN DERIVATIVE 5 UNITS: 5 INJECTION, SOLUTION INTRADERMAL at 11:08

## 2020-08-24 RX ADMIN — CALCIUM ACETATE 667 MG: 667 CAPSULE ORAL at 11:08

## 2020-08-24 RX ADMIN — ATORVASTATIN CALCIUM 80 MG: 40 TABLET, FILM COATED ORAL at 10:08

## 2020-08-24 RX ADMIN — CLOPIDOGREL 75 MG: 75 TABLET, FILM COATED ORAL at 10:08

## 2020-08-24 RX ADMIN — CALCITRIOL CAPSULES 0.25 MCG 0.25 MCG: 0.25 CAPSULE ORAL at 10:08

## 2020-08-24 RX ADMIN — CALCIUM ACETATE 667 MG: 667 CAPSULE ORAL at 04:08

## 2020-08-24 RX ADMIN — INSULIN ASPART 2 UNITS: 100 INJECTION, SOLUTION INTRAVENOUS; SUBCUTANEOUS at 09:08

## 2020-08-24 RX ADMIN — TAMSULOSIN HYDROCHLORIDE 0.8 MG: 0.4 CAPSULE ORAL at 10:08

## 2020-08-24 RX ADMIN — MAGNESIUM OXIDE TAB 400 MG (241.3 MG ELEMENTAL MG) 400 MG: 400 (241.3 MG) TAB at 09:08

## 2020-08-24 RX ADMIN — HYDRALAZINE HYDROCHLORIDE 25 MG: 25 TABLET, FILM COATED ORAL at 09:08

## 2020-08-24 RX ADMIN — MAGNESIUM OXIDE TAB 400 MG (241.3 MG ELEMENTAL MG) 400 MG: 400 (241.3 MG) TAB at 10:08

## 2020-08-24 RX ADMIN — PHENYTOIN SODIUM 100 MG: 100 CAPSULE ORAL at 04:08

## 2020-08-24 RX ADMIN — MUPIROCIN: 20 OINTMENT TOPICAL at 10:08

## 2020-08-24 RX ADMIN — LABETALOL HYDROCHLORIDE 100 MG: 100 TABLET, FILM COATED ORAL at 09:08

## 2020-08-24 RX ADMIN — PHENYTOIN SODIUM 100 MG: 100 CAPSULE ORAL at 10:08

## 2020-08-24 RX ADMIN — PHENYTOIN SODIUM 100 MG: 100 CAPSULE ORAL at 09:08

## 2020-08-24 RX ADMIN — MUPIROCIN: 20 OINTMENT TOPICAL at 09:08

## 2020-08-24 NOTE — SUBJECTIVE & OBJECTIVE
Interval History: Complaining of unable to sleep at night.    Review of Systems   HENT: Negative for ear discharge and ear pain.    Eyes: Negative for discharge and itching.   Endocrine: Negative for cold intolerance and heat intolerance.   Neurological: Negative for seizures and syncope.     Objective:     Vital Signs (Most Recent):  Temp: 98.2 °F (36.8 °C) (08/24/20 0900)  Pulse: 98 (08/24/20 0900)  Resp: 16 (08/24/20 0900)  BP: 111/81 (08/24/20 0900)  SpO2: 97 % (08/23/20 2344) Vital Signs (24h Range):  Temp:  [98 °F (36.7 °C)-99 °F (37.2 °C)] 98.2 °F (36.8 °C)  Pulse:  [] 98  Resp:  [16-19] 16  SpO2:  [97 %-99 %] 97 %  BP: (100-175)/(56-94) 111/81     Weight: 98 kg (216 lb 0.8 oz)  Body mass index is 32.85 kg/m².    Intake/Output Summary (Last 24 hours) at 8/24/2020 1047  Last data filed at 8/24/2020 0900  Gross per 24 hour   Intake 900 ml   Output 1000 ml   Net -100 ml      Physical Exam  Vitals signs and nursing note reviewed.   Constitutional:       General: He is not in acute distress.     Appearance: He is well-developed.   HENT:      Head: Normocephalic and atraumatic.      Right Ear: External ear normal.      Left Ear: External ear normal.      Nose: Nose normal.   Eyes:      Conjunctiva/sclera: Conjunctivae normal.      Pupils: Pupils are equal, round, and reactive to light.   Neck:      Musculoskeletal: Normal range of motion and neck supple.   Cardiovascular:      Rate and Rhythm: Normal rate and regular rhythm.   Pulmonary:      Effort: Pulmonary effort is normal. No respiratory distress.      Breath sounds: No wheezing.   Abdominal:      General: Bowel sounds are normal. There is no distension.      Palpations: Abdomen is soft.      Tenderness: There is no abdominal tenderness.      Comments: No palpable hepatomegaly or splenomegaly   Musculoskeletal:         General: Swelling present. No tenderness.   Skin:     General: Skin is warm and dry.   Neurological:      Mental Status: He is alert and  oriented to person, place, and time.      Comments: Right sided hemiplegia   Psychiatric:         Thought Content: Thought content normal.         Significant Labs:   BMP:   Recent Labs   Lab 08/24/20  0049   *   *   K 4.8      CO2 20*   BUN 50*   CREATININE 5.7*   CALCIUM 7.1*     CBC:   Recent Labs   Lab 08/24/20  0937   WBC 8.99   HGB 8.7*   HCT 26.5*          Significant Imaging: I have reviewed all pertinent imaging results/findings within the past 24 hours.

## 2020-08-24 NOTE — PROGRESS NOTES
[facility] Hospital Medicine  Progress Note    Patient Name: Miguel Moore  MRN: [id number]  Patient Class: IP- Inpatient   Admission Date: 8/15/2020  Length of Stay: 8 days  Attending Physician: Barrie Negro MD  Primary Care Provider: Raciel Raymond MD        Subjective:     Principal Problem:Nephrotic syndrome        HPI:  65 y.o. 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  States he was called on 8/13/2020, but did not come to the ED until today.  He reports mild edema and weight gain of 1-2 lb.  Denies fever, chills, cough, SOB, chest pain, palpitations, orthopnea, PND, dizziness, syncope, n/v/d, abd pain, or dysuria.  In the ED, Cr/BUN 5.3/64, K+ 5.4, H/H 7.5/22.7, CPK 1262, , UA shows proteinura 3+, renal US suggest chronic medical renal disease.  His Nephrologist is Dr. Roa, last visit Oct 2019.  Nephrology consult.  Placed in observation.    Overview/Hospital Course:  65 y.o. AAM with history significant for DMII, HTN, stroke, and CKD4 presents for evaluation of worsening CKD sent in by provider. Creatine = 2.0 1 year ago now with creatine 5.4 and potassium 5.8 worsening swelling and anasarca 2-3+ edema up to abdomen and flank, activity intolerance and orthopnea X 5 pillows. He is not on hemodialysis.  Amlodipine stopped.  Worsening BP and started on Hydralazine.  Nephrology consulted.  BP improved, but worsening Creat with persistent hyperkalemia.  Patient will need HD.  IR consulted for HD catheter.  Temporary HD catheter placed on 8/20 and patient underwent HD.  Catheter switched to tunneled catheter on 8/21.  Patient with right sided hemiplegia from previous stroke.  Deconditioned and PT/OT consulted.  Initial recommendation for SNF.  Patient will need outpatient HD  arrangements and SW consulted.    Interval History: Complaining of unable to sleep at night.    Review of Systems   HENT: Negative for ear discharge and ear pain.    Eyes: Negative for discharge and itching.   Endocrine: Negative for cold intolerance and heat intolerance.   Neurological: Negative for seizures and syncope.     Objective:     Vital Signs (Most Recent):  Temp: 98.2 °F (36.8 °C) (08/24/20 0900)  Pulse: 98 (08/24/20 0900)  Resp: 16 (08/24/20 0900)  BP: 111/81 (08/24/20 0900)  SpO2: 97 % (08/23/20 2344) Vital Signs (24h Range):  Temp:  [98 °F (36.7 °C)-99 °F (37.2 °C)] 98.2 °F (36.8 °C)  Pulse:  [] 98  Resp:  [16-19] 16  SpO2:  [97 %-99 %] 97 %  BP: (100-175)/(56-94) 111/81     Weight: 98 kg (216 lb 0.8 oz)  Body mass index is 32.85 kg/m².    Intake/Output Summary (Last 24 hours) at 8/24/2020 1047  Last data filed at 8/24/2020 0900  Gross per 24 hour   Intake 900 ml   Output 1000 ml   Net -100 ml      Physical Exam  Vitals signs and nursing note reviewed.   Constitutional:       General: He is not in acute distress.     Appearance: He is well-developed.   HENT:      Head: Normocephalic and atraumatic.      Right Ear: External ear normal.      Left Ear: External ear normal.      Nose: Nose normal.   Eyes:      Conjunctiva/sclera: Conjunctivae normal.      Pupils: Pupils are equal, round, and reactive to light.   Neck:      Musculoskeletal: Normal range of motion and neck supple.   Cardiovascular:      Rate and Rhythm: Normal rate and regular rhythm.   Pulmonary:      Effort: Pulmonary effort is normal. No respiratory distress.      Breath sounds: No wheezing.   Abdominal:      General: Bowel sounds are normal. There is no distension.      Palpations: Abdomen is soft.      Tenderness: There is no abdominal tenderness.      Comments: No palpable hepatomegaly or splenomegaly   Musculoskeletal:         General: Swelling present. No tenderness.   Skin:     General: Skin is warm and dry.   Neurological:       Mental Status: He is alert and oriented to person, place, and time.      Comments: Right sided hemiplegia   Psychiatric:         Thought Content: Thought content normal.         Significant Labs:   BMP:   Recent Labs   Lab 08/24/20  0049   *   *   K 4.8      CO2 20*   BUN 50*   CREATININE 5.7*   CALCIUM 7.1*     CBC:   Recent Labs   Lab 08/24/20  0937   WBC 8.99   HGB 8.7*   HCT 26.5*          Significant Imaging: I have reviewed all pertinent imaging results/findings within the past 24 hours.      Assessment/Plan:      * Nephrotic syndrome  Patient with profound edema - anasarca up to abdomen - 2-3+ pitting after overnight diuresis + 3+ proteinuria - CKD stage 4 worsened from creatine baseline up to creatine of around 3-->5.8, severe activity intolerance - Nephrology consulted and following - diureses, strict I&Os, daily weight  -stop amlodipine - added labetalol per nephrology.  Also started on Hydralazine.  Increasing Creat with persistent hyperkalemia.  HD catheter placed and patient underwent HD on 8/20.  Will discuss need to continue IV Lasix with Nephrology.  Will need outpatient HD arrangements.  SW consulted.    Anemia  Anemia of CKD  H/H stable since admission with no evidence of bleeding.        Enlarged prostate  Continue home flomax - voiding well - strict I&Os      Hypocalcemia  Started on calcitrol 0.25 mcg and calcium acetate 667 po tid AC    Anasarca  See above      Type 2 diabetes mellitus with diabetic neuropathy, with long-term current use of insulin        CKD (chronic kidney disease) stage 4, GFR 15-29 ml/min with proteinuria  Acute on chronic CKD stage 4  Appreciate Nephrology input.  As above.    Hemiplegia and hemiparesis following cerebral infarction affecting right dominant side  Stable, no acute issue, continue plavix/statin.  PT/OT evaluation.  Initial recommendation for SNF.  SW consulted.    Seizure disorder as sequela of cerebrovascular accident  Continue  home regimen of phenytoin  Subtherapeutic level.  No seizures.  Will continue current regimen for now.    Benign non-nodular prostatic hyperplasia without lower urinary tract symptoms  Continue home regimen of tamsulosin     Controlled type 2 diabetes mellitus with stage 4 chronic kidney disease, with long-term current use of insulin  Last HgbA1c   Lab Results   Component Value Date    HGBA1C 5.5 08/13/2020     Hold oral antihyperglycemics while inpatient  PRN sliding scale insulin  ACHS glucose monitoring   ADA diet     Pure hypercholesterolemia  Continue statin    Benign essential HTN; ACEI hyperK  Initially uncontrolled.  Norvasc stopped with lower extremity edema.  Started on Hydralazine.  Increase as needed.  Improving.      VTE Risk Mitigation (From admission, onward)         Ordered     heparin (porcine) injection 5,000 Units  As needed (PRN)      08/20/20 1251     IP VTE HIGH RISK PATIENT  Once      08/15/20 1505     Place sequential compression device  Until discontinued      08/15/20 1505                Discharge Planning   GARY:      Code Status: Full Code   Is the patient medically ready for discharge?:     Reason for patient still in hospital (select all that apply): Patient trending condition  Discharge Plan A: Skilled Nursing Facility   Discharge Delays: None known at this time              Barrie Negro MD  Department of Hospital Medicine   [facility]

## 2020-08-24 NOTE — ASSESSMENT & PLAN NOTE
Stable, no acute issue, continue plavix/statin.  PT/OT evaluation.  Initial recommendation for SNF.  SW consulted.

## 2020-08-24 NOTE — PLAN OF CARE
Received HD schedule letter from [facility]; pt has been accepted for HD to start 8/28/20 @ 1:00PM; letter forwarded to daughter and will be sent to accepting SNF facility; MD notified via secure chat that patient may need to have 2 treatments here prior to discharge to start his HD on Friday 08/24/20 1321   Post-Acute Status   Post-Acute Authorization Dialysis   Diaylsis Status Set-up Complete  ([facility] MWF @ 1300)

## 2020-08-24 NOTE — PT/OT/SLP PROGRESS
"Physical Therapy Treatment    Patient Name:  Miguel Moore   MRN:  [id number]    Recommendations:     Discharge Recommendations:  nursing facility, skilled   Discharge Equipment Recommendations: bedside commode   Barriers to discharge: decreased mobility, increased weakness, increased  fall risk    Assessment:     Miguel Moore is a 65 y.o. male admitted with a medical diagnosis of Nephrotic syndrome.  He presents with the following impairments/functional limitations:  weakness, impaired endurance, impaired functional mobilty, gait instability, impaired balance, decreased upper extremity function, decreased lower extremity function, decreased safety awareness     Pt with increased fatigue today 2/2 to dialysis this morning.  Pt performed LE [place] supine.  LLE TE 3x10;  Pt reported increased fatigue /p therex, declining further activity til tomorrow.    Rehab Prognosis: Fair; patient would benefit from acute skilled PT services to address these deficits and reach maximum level of function.    Recent Surgery: Procedure(s) (LRB):  EGD (ESOPHAGOGASTRODUODENOSCOPY) (N/A) 7 Days Post-Op    Plan:     During this hospitalization, patient to be seen 5 x/week to address the identified rehab impairments via therapeutic activities, gait training, therapeutic exercises, neuromuscular re-education and progress toward the following goals:    · Plan of Care Expires:  08/26/20    Subjective     Chief Complaint: tired  Patient/Family Comments/goals: "come back tomorrow and i'll do the standing part."  Pain/Comfort:  · Pain Rating 1: 0/10      Objective:     Communicated with nurse prior to session.  Patient found HOB elevated with telemetry, peripheral IV upon PT entry to room.     General Precautions: Standard, fall, diabetic   Orthopedic Precautions:N/A   Braces: N/A       AM-PAC 6 CLICK MOBILITY  Sitting down on and standing up from a chair with arms (e.g., wheelchair, bedside commode, etc.): 2  Moving from lying on back to " sitting on the side of the bed?: 2  Moving to and from a bed to a chair (including a wheelchair)?: 2  Need to walk in hospital room?: 2  Climbing 3-5 steps with a railing?: 1       Therapeutic Activities and Exercises:   LLE TE supine 3x10; HS, abd/add,ap    Patient left HOB elevated with all lines intact, call button in reach, bed alarm on and nurse notified..    GOALS:   Multidisciplinary Problems     Physical Therapy Goals        Problem: Physical Therapy Goal    Goal Priority Disciplines Outcome Goal Variances Interventions   Physical Therapy Goal     PT, PT/OT Ongoing, Progressing     Description: Goals to be met by:     Patient will increase functional independence with mobility by performin. Supine to sit with Stand-by Assistance  2. Sit to supine with Stand-by Assistance  3. Sit to stand transfer with Stand-by Assistance using Quad Cane.  4. Bed to chair transfer with Minimal Assistance using Quad Cane and stand pivot transfer.  5. Bed to Wheelchair transfer with Stand-by Assistance and squat pivot transfer.  6. Gait x 50 feet with Minimal Assistance using Quad Cane.   7. Stand for 20 minutes with Stand-by Assistance using Quad Cane  8. Lower extremity exercise program x20 reps per handout, with assistance as needed                     Time Tracking:     PT Received On: 20  PT Start Time: 1330     PT Stop Time: 1348  PT Total Time (min): 18 min     Billable Minutes: Therapeutic Exercise 18    Treatment Type: Treatment  PT/PTA: PTA     PTA Visit Number: 2     Julián Milton, PTA  2020

## 2020-08-24 NOTE — ASSESSMENT & PLAN NOTE
Continue home regimen of phenytoin  Subtherapeutic level.  No seizures.  Will continue current regimen for now.

## 2020-08-24 NOTE — PT/OT/SLP PROGRESS
"Physical Therapy Treatment    Patient Name:  Miguel Moore   MRN:  [id number]    Recommendations:     Discharge Recommendations:  nursing facility, skilled   Discharge Equipment Recommendations: bedside commode   Barriers to discharge: decreased mobility, increased weakness, increased fall risk    Assessment:     Miguel Moore is a 65 y.o. male admitted with a medical diagnosis of Nephrotic syndrome.  He presents with the following impairments/functional limitations:  weakness, impaired endurance, impaired functional mobilty, gait instability, impaired balance, decreased upper extremity function, decreased lower extremity function, decreased safety awareness.  Pt motivated to participate. Sup to sit with mod A, sit to sup with Min A, sit to stand with mod A /c QC x3 from EOB. Pt with difficulty with moving RLE. Pt took a few small side step s with decreased foot clearance, decreased step length and height, foot flat.  Pt with increased weakness, poor endurance, poor balance needing min/mod A for wt shifting,      Rehab Prognosis: Fair; patient would benefit from acute skilled PT services to address these deficits and reach maximum level of function.    Recent Surgery: Procedure(s) (LRB):  EGD (ESOPHAGOGASTRODUODENOSCOPY) (N/A) 7 Days Post-Op    Plan:     During this hospitalization, patient to be seen 5 x/week to address the identified rehab impairments via therapeutic activities, gait training, therapeutic exercises, neuromuscular re-education and progress toward the following goals:    · Plan of Care Expires:  08/26/20    Subjective     Chief Complaint: "my leg is heavy."  Patient/Family Comments/goals: pt agreed to therapy  Pain/Comfort:  · Pain Rating 1: 0/10      Objective:     Communicated with nurse prior to session.  Patient found HOB elevated with telemetry, peripheral IV upon PT entry to room.     General Precautions: Standard, fall, diabetic   Orthopedic Precautions:N/A   Braces: N/A     Functional " Mobility:  · Bed Mobility:     · Scooting: moderate assistance  · Supine to Sit: moderate assistance  · Sit to Supine: moderate assistance  · Transfers:     · Sit to Stand:  moderate assistance with quad cane x 3 trials. Static stand x 1-2 mins eachGait: 3 side steps x 3 trials with qc  · Balance: f/f- sitting and standing      AM-PAC 6 CLICK MOBILITY  Turning over in bed (including adjusting bedclothes, sheets and blankets)?: 3  Sitting down on and standing up from a chair with arms (e.g., wheelchair, bedside commode, etc.): 2  Moving from lying on back to sitting on the side of the bed?: 2  Moving to and from a bed to a chair (including a wheelchair)?: 2  Need to walk in hospital room?: 2  Climbing 3-5 steps with a railing?: 1  Basic Mobility Total Score: 12       Therapeutic Activities and Exercises:   sat EOB X 15 mins with poor posture and balance, LOB needed min A and vcs to return to midline    Patient left HOB elevated with all lines intact, call button in reach, bed alarm on and nurse notified..    GOALS:   Multidisciplinary Problems     Physical Therapy Goals        Problem: Physical Therapy Goal    Goal Priority Disciplines Outcome Goal Variances Interventions   Physical Therapy Goal     PT, PT/OT Ongoing, Progressing     Description: Goals to be met by:     Patient will increase functional independence with mobility by performin. Supine to sit with Stand-by Assistance  2. Sit to supine with Stand-by Assistance  3. Sit to stand transfer with Stand-by Assistance using Quad Cane.  4. Bed to chair transfer with Minimal Assistance using Quad Cane and stand pivot transfer.  5. Bed to Wheelchair transfer with Stand-by Assistance and squat pivot transfer.  6. Gait x 50 feet with Minimal Assistance using Quad Cane.   7. Stand for 20 minutes with Stand-by Assistance using Quad Cane  8. Lower extremity exercise program x20 reps per handout, with assistance as needed                     Time Tracking:      PT Received On: 08/23/20  PT Start Time: 1505     PT Stop Time: 1533  PT Total Time (min): 28 min     Billable Minutes: Gait Training 15 and Therapeutic Activity 13    Treatment Type: Treatment  PT/PTA: PTA     PTA Visit Number: 2     Julián Milton, [place]  08/24/2020

## 2020-08-24 NOTE — PROGRESS NOTES
08/24/20 0900   Post-Hemodialysis Assessment   Blood Volume Processed (Liters) 61 L   Dialyzer Clearance Lightly streaked   Duration of Treatment (minutes) 180 minutes   Hemodialysis Intake (mL) 500 mL   Total UF (mL) 1000 mL   Net Fluid Removal 500     Report given to bedside RN, nadn, vss.

## 2020-08-24 NOTE — CONSULTS
SW consult received to arrange outpt HD and SNF; TN spoke with patient and daughter and both are in agreement; HD referral sent to [facility] d/t proximity of home; pending review;     List of surrounding SNF facilities given to daughter; plan to provide choices out of accepting facilities today; TN to follow up with referrals

## 2020-08-24 NOTE — PROGRESS NOTES
Miguel Moore is a 65 y.o. male patient.    Follow for ANA, CKD stg 4, dialysis    Patient seen while on dialsyis  No new c/o, comfortable    Scheduled Meds:   atorvastatin  80 mg Oral Daily    calcitRIOL  0.25 mcg Oral Daily    calcium acetate(phosphat bind)  667 mg Oral TID WM    clopidogreL  75 mg Oral Daily    epoetin marisol-epbx  10,000 Units Subcutaneous Every Tues, Thurs, Sat    furosemide (LASIX) IV  40 mg Intravenous Q12H    hydrALAZINE  25 mg Oral Q8H    labetaloL  100 mg Oral Q12H    magnesium oxide  400 mg Oral BID    mupirocin   Nasal BID    phenytoin  100 mg Oral TID    tamsulosin  2 capsule Oral Daily       Review of patient's allergies indicates:   Allergen Reactions    Ace inhibitors      Hyperkalemia 8/2018    Penicillins Hives    Tizanidine      Felt hot         Vital Signs Range (Last 24H):  Temp:  [98 °F (36.7 °C)-99 °F (37.2 °C)]   Pulse:  []   Resp:  [17-19]   BP: (100-175)/(56-85)   SpO2:  [97 %-99 %]     I & O (Last 24H):    Intake/Output Summary (Last 24 hours) at 8/24/2020 0618  Last data filed at 8/23/2020 1700  Gross per 24 hour   Intake 600 ml   Output --   Net 600 ml           Physical Exam:  General appearance: well developed, well nourished, no distress  Lungs:  clear to auscultation bilaterally and normal respiratory effort  Heart: regular rate and rhythm  Abdomen: soft, non-tender non-distented; bowel sounds normal; no masses,  no organomegaly  Extremities: edema (+)    Laboratory:  CBC:   Recent Labs   Lab 08/22/20  0517   WBC 6.22   RBC 3.27*   HGB 8.2*   HCT 24.7*      MCV 76*   MCH 25.1*   MCHC 33.2     CMP:   Recent Labs   Lab 08/24/20  0049   *   CALCIUM 7.1*   ALBUMIN 2.3*   PROT 5.8*   *   K 4.8   CO2 20*      BUN 50*   CREATININE 5.7*   ALKPHOS 88   ALT 17   AST 15   BILITOT 0.2       Imp/Plan    ANA on CKD stg 4 - most likely reach ESRD, on dialysis, tolerated well  HTN  DM type 2  Proteinuria  Anemia of CKD      Trac T  Le  8/24/2020

## 2020-08-24 NOTE — PLAN OF CARE
Problem: Physical Therapy Goal  Goal: Physical Therapy Goal  Description: Goals to be met by:     Patient will increase functional independence with mobility by performin. Supine to sit with Stand-by Assistance  2. Sit to supine with Stand-by Assistance  3. Sit to stand transfer with Stand-by Assistance using Quad Cane.  4. Bed to chair transfer with Minimal Assistance using Quad Cane and stand pivot transfer.  5. Bed to Wheelchair transfer with Stand-by Assistance and squat pivot transfer.  6. Gait x 50 feet with Minimal Assistance using Quad Cane.   7. Stand for 20 minutes with Stand-by Assistance using Quad Cane  8. Lower extremity exercise program x20 reps per handout, with assistance as needed    Outcome: Ongoing, Progressing   Pt with increased fatigue today 2/2 to dialysis this morning.  Pt performed LE [place] supine.  LLE TE 3x10;  Pt reported increased fatigue /p therex, declining further activity til tomorrow.

## 2020-08-24 NOTE — PLAN OF CARE
Important Message from Medicare and discharge appeal process reviewed with patient's daughter, Maddy; copy emailed to sheba       08/24/20 0289   Medicare Message   Important Message from Medicare regarding Discharge Appeal Rights Given to patient/caregiver;Explained to patient/caregiver;Signed/date by patient/caregiver   Date IMM was signed 08/24/20   Time IMM was signed [id number]

## 2020-08-24 NOTE — PT/OT/SLP PROGRESS
Occupational Therapy      Patient Name:  Miguel Moore   MRN:  [id number]    Patient not seen today secondary to (HOLLY to dialysis in the AM; limited bed level session with PTA this afternoon 2* fatigue from HD.). Will follow-up tomorrow, 08/25/20.    Minerva Pleitez, JEROD  8/24/2020

## 2020-08-24 NOTE — PLAN OF CARE
Problem: Physical Therapy Goal  Goal: Physical Therapy Goal  Description: Goals to be met by:     Patient will increase functional independence with mobility by performin. Supine to sit with Stand-by Assistance  2. Sit to supine with Stand-by Assistance  3. Sit to stand transfer with Stand-by Assistance using Quad Cane.  4. Bed to chair transfer with Minimal Assistance using Quad Cane and stand pivot transfer.  5. Bed to Wheelchair transfer with Stand-by Assistance and squat pivot transfer.  6. Gait x 50 feet with Minimal Assistance using Quad Cane.   7. Stand for 20 minutes with Stand-by Assistance using Quad Cane  8. Lower extremity exercise program x20 reps per handout, with assistance as needed    Outcome: Ongoing, Progressing   Pt motivated to participate. Sup to sit with mod A, sit to sup with Min A, sit to stand with mod A /c QC x3 from EOB. Pt with difficulty with moving RLE. Pt took a few small side step s with decreased foot clearance, decreased step length and height, foot flat.  Pt with increased weakness, poor endurance, poor balance needing min/mod A for wt shifting,

## 2020-08-24 NOTE — CONSULTS
[facility] Medicine  Telemedicine Consult Note     Unable to accept patient to virtual medicine at this time due to limitations in availability of telemedicine staff.     Rosalva Manning MD  [facility] Medicine Staff

## 2020-08-25 PROBLEM — R50.9 FEVER: Status: ACTIVE | Noted: 2020-08-25

## 2020-08-25 LAB
BACTERIA #/AREA URNS HPF: ABNORMAL /HPF
BILIRUB UR QL STRIP: NEGATIVE
CLARITY UR: ABNORMAL
COLOR UR: YELLOW
GLUCOSE UR QL STRIP: ABNORMAL
HBV CORE AB SERPL QL IA: NEGATIVE
HGB UR QL STRIP: ABNORMAL
HYALINE CASTS #/AREA URNS LPF: 0 /LPF
KETONES UR QL STRIP: NEGATIVE
LEUKOCYTE ESTERASE UR QL STRIP: ABNORMAL
MICROSCOPIC COMMENT: ABNORMAL
NITRITE UR QL STRIP: NEGATIVE
PH UR STRIP: 6 [PH] (ref 5–8)
POCT GLUCOSE: 204 MG/DL (ref 70–110)
PROT UR QL STRIP: ABNORMAL
RBC #/AREA URNS HPF: 5 /HPF (ref 0–4)
SP GR UR STRIP: 1.01 (ref 1–1.03)
SQUAMOUS #/AREA URNS HPF: 4 /HPF
URN SPEC COLLECT METH UR: ABNORMAL
UROBILINOGEN UR STRIP-ACNC: NEGATIVE EU/DL
WBC #/AREA URNS HPF: 10 /HPF (ref 0–5)
YEAST URNS QL MICRO: ABNORMAL

## 2020-08-25 PROCEDURE — 63600175 PHARM REV CODE 636 W HCPCS: Performed by: INTERNAL MEDICINE

## 2020-08-25 PROCEDURE — 63600175 PHARM REV CODE 636 W HCPCS: Performed by: HOSPITALIST

## 2020-08-25 PROCEDURE — 25000003 PHARM REV CODE 250: Performed by: INTERNAL MEDICINE

## 2020-08-25 PROCEDURE — 25000003 PHARM REV CODE 250: Performed by: HOSPITALIST

## 2020-08-25 PROCEDURE — 21400001 HC TELEMETRY ROOM

## 2020-08-25 PROCEDURE — 36415 COLL VENOUS BLD VENIPUNCTURE: CPT

## 2020-08-25 PROCEDURE — 87040 BLOOD CULTURE FOR BACTERIA: CPT

## 2020-08-25 PROCEDURE — 97802 MEDICAL NUTRITION INDIV IN: CPT

## 2020-08-25 PROCEDURE — 87106 FUNGI IDENTIFICATION YEAST: CPT

## 2020-08-25 PROCEDURE — 81000 URINALYSIS NONAUTO W/SCOPE: CPT

## 2020-08-25 RX ORDER — LACTULOSE 10 G/15ML
20 SOLUTION ORAL ONCE
Status: COMPLETED | OUTPATIENT
Start: 2020-08-25 | End: 2020-08-25

## 2020-08-25 RX ORDER — OXYCODONE AND ACETAMINOPHEN 5; 325 MG/1; MG/1
1 TABLET ORAL EVERY 4 HOURS PRN
Status: DISCONTINUED | OUTPATIENT
Start: 2020-08-25 | End: 2020-09-01 | Stop reason: HOSPADM

## 2020-08-25 RX ORDER — ACETAMINOPHEN 325 MG/1
650 TABLET ORAL EVERY 4 HOURS PRN
Status: DISCONTINUED | OUTPATIENT
Start: 2020-08-25 | End: 2020-09-01 | Stop reason: HOSPADM

## 2020-08-25 RX ORDER — FUROSEMIDE 40 MG/1
80 TABLET ORAL DAILY
Status: DISCONTINUED | OUTPATIENT
Start: 2020-08-26 | End: 2020-08-30

## 2020-08-25 RX ORDER — LACTULOSE 10 G/15ML
30 SOLUTION ORAL ONCE
Status: DISCONTINUED | OUTPATIENT
Start: 2020-08-25 | End: 2020-09-01 | Stop reason: HOSPADM

## 2020-08-25 RX ORDER — HEPARIN SODIUM 5000 [USP'U]/ML
5000 INJECTION, SOLUTION INTRAVENOUS; SUBCUTANEOUS EVERY 8 HOURS
Status: DISCONTINUED | OUTPATIENT
Start: 2020-08-25 | End: 2020-08-27

## 2020-08-25 RX ADMIN — PHENYTOIN SODIUM 100 MG: 100 CAPSULE ORAL at 09:08

## 2020-08-25 RX ADMIN — VANCOMYCIN HYDROCHLORIDE 2000 MG: 100 INJECTION, POWDER, LYOPHILIZED, FOR SOLUTION INTRAVENOUS at 02:08

## 2020-08-25 RX ADMIN — CLOPIDOGREL 75 MG: 75 TABLET, FILM COATED ORAL at 09:08

## 2020-08-25 RX ADMIN — OXYCODONE HYDROCHLORIDE AND ACETAMINOPHEN 1 TABLET: 5; 325 TABLET ORAL at 08:08

## 2020-08-25 RX ADMIN — EPOETIN ALFA-EPBX 10000 UNITS: 10000 INJECTION, SOLUTION INTRAVENOUS; SUBCUTANEOUS at 11:08

## 2020-08-25 RX ADMIN — ACETAMINOPHEN 650 MG: 325 TABLET ORAL at 12:08

## 2020-08-25 RX ADMIN — TRAZODONE HYDROCHLORIDE 50 MG: 50 TABLET ORAL at 08:08

## 2020-08-25 RX ADMIN — FUROSEMIDE 40 MG: 10 INJECTION, SOLUTION INTRAVENOUS at 04:08

## 2020-08-25 RX ADMIN — MAGNESIUM OXIDE TAB 400 MG (241.3 MG ELEMENTAL MG) 400 MG: 400 (241.3 MG) TAB at 09:08

## 2020-08-25 RX ADMIN — DIAZEPAM 2 MG: 2 TABLET ORAL at 09:08

## 2020-08-25 RX ADMIN — ACETAMINOPHEN 650 MG: 325 TABLET ORAL at 02:08

## 2020-08-25 RX ADMIN — CALCIUM ACETATE 667 MG: 667 CAPSULE ORAL at 04:08

## 2020-08-25 RX ADMIN — HEPARIN SODIUM 5000 UNITS: 5000 INJECTION INTRAVENOUS; SUBCUTANEOUS at 11:08

## 2020-08-25 RX ADMIN — TAMSULOSIN HYDROCHLORIDE 0.8 MG: 0.4 CAPSULE ORAL at 09:08

## 2020-08-25 RX ADMIN — HYDRALAZINE HYDROCHLORIDE 25 MG: 25 TABLET, FILM COATED ORAL at 02:08

## 2020-08-25 RX ADMIN — LABETALOL HYDROCHLORIDE 100 MG: 100 TABLET, FILM COATED ORAL at 09:08

## 2020-08-25 RX ADMIN — CALCITRIOL CAPSULES 0.25 MCG 0.25 MCG: 0.25 CAPSULE ORAL at 09:08

## 2020-08-25 RX ADMIN — OXYCODONE HYDROCHLORIDE AND ACETAMINOPHEN 1 TABLET: 5; 325 TABLET ORAL at 04:08

## 2020-08-25 RX ADMIN — CALCIUM ACETATE 667 MG: 667 CAPSULE ORAL at 11:08

## 2020-08-25 RX ADMIN — CALCIUM ACETATE 667 MG: 667 CAPSULE ORAL at 09:08

## 2020-08-25 RX ADMIN — MAGNESIUM OXIDE TAB 400 MG (241.3 MG ELEMENTAL MG) 400 MG: 400 (241.3 MG) TAB at 08:08

## 2020-08-25 RX ADMIN — PHENYTOIN SODIUM 100 MG: 100 CAPSULE ORAL at 08:08

## 2020-08-25 RX ADMIN — PHENYTOIN SODIUM 100 MG: 100 CAPSULE ORAL at 02:08

## 2020-08-25 RX ADMIN — LACTULOSE 20 G: 10 SOLUTION ORAL at 09:08

## 2020-08-25 RX ADMIN — ATORVASTATIN CALCIUM 80 MG: 40 TABLET, FILM COATED ORAL at 09:08

## 2020-08-25 RX ADMIN — HYDRALAZINE HYDROCHLORIDE 25 MG: 25 TABLET, FILM COATED ORAL at 06:08

## 2020-08-25 NOTE — PROGRESS NOTES
Miguel Moore is a 65 y.o. male patient.    Follow for ANA, CKD stg 4, dialysis    C/o having problem with constipation  Otherwise doing OK    Scheduled Meds:   atorvastatin  80 mg Oral Daily    calcitRIOL  0.25 mcg Oral Daily    calcium acetate(phosphat bind)  667 mg Oral TID WM    clopidogreL  75 mg Oral Daily    epoetin marisol-epbx  10,000 Units Subcutaneous Every Tues, Thurs, Sat    furosemide (LASIX) IV  40 mg Intravenous Q12H    hydrALAZINE  25 mg Oral Q8H    labetaloL  100 mg Oral Q12H    magnesium oxide  400 mg Oral BID    phenytoin  100 mg Oral TID    tamsulosin  2 capsule Oral Daily    trazodone  50 mg Oral QHS       Review of patient's allergies indicates:   Allergen Reactions    Ace inhibitors      Hyperkalemia 8/2018    Penicillins Hives    Tizanidine      Felt hot         Vital Signs Range (Last 24H):  Temp:  [98 °F (36.7 °C)-102.2 °F (39 °C)]   Pulse:  []   Resp:  [16-19]   BP: (120-176)/(58-79)   SpO2:  [96 %-98 %]     I & O (Last 24H):No intake or output data in the 24 hours ending 08/25/20 1015        Physical Exam:  General appearance: well developed, well nourished, no distress  Lungs:  clear to auscultation bilaterally and normal respiratory effort  Heart: regular rate and rhythm  Abdomen: soft, non-tender non-distented; bowel sounds normal; no masses,  no organomegaly  Extremities: edema (+)    Laboratory:  CBC:   Recent Labs   Lab 08/24/20  0937   WBC 8.99   RBC 3.49*   HGB 8.7*   HCT 26.5*      MCV 76*   MCH 24.9*   MCHC 32.8     CMP:   Recent Labs   Lab 08/24/20  0049   *   CALCIUM 7.1*   ALBUMIN 2.3*   PROT 5.8*   *   K 4.8   CO2 20*      BUN 50*   CREATININE 5.7*   ALKPHOS 88   ALT 17   AST 15   BILITOT 0.2       Imp/Plan    ANA on CKD stg 4 - ESRD, HD q MWF  HTN  DM type 2  Fever - afebrile now, follow cultures  Proteinuria - nephrotic  Anemia of CKD    Continue present Rx  Lactulose 30 cc po x 1  We'll follow for dialysis      Trac T  Le  8/25/2020

## 2020-08-25 NOTE — PROGRESS NOTES
"[facility]  Adult Nutrition  Progress Note    SUMMARY       Recommendations    1. Continue diabetic, renal diet, encourage PO intake.   2. Weigh pt 3x/week after HD    Goals: 1. Consume >/=75% of meals by discharge  Nutrition Goal Status: new  Communication of RD Recs: (plan of care)    Reason for Assessment    Reason For Assessment: length of stay(DAY 10)  Diagnosis: renal disease(Nephrotic syndrome)  Relevant Medical History: stroke, HTN, T2DM, nuclear sclerosis, UTI  Interdisciplinary Rounds: did not attend    General Information Comments: 65 y.o. male was sent to the ED 10 days ago by PMD after routine lab work demonstarted worsening CKD w/ creat 5.4. Pt states he has gained 2 lbs over the past week. Pt wife reports montioring the pt salt intake. Pt started HD 8/20 and will continue qMWF outpatient. Pt on PO diet intake 50-75% of meals. Pt reports having constipation but is resolved with lactulose. No other GI distress noted. No reports of chewing/swallowing difficulty. No weight loss noted only weight gain. NFPE not warrented at this time. Pt appears well-nourished. Will continue to monitor.    Nutrition Discharge Planning: Adequate intake on diabetic/renal diet    Nutrition Risk Screen    Nutrition Risk Screen: no indicators present    Nutrition/Diet History    Patient Reported Diet/Restrictions/Preferences: heart healthy, diabetic diet  Spiritual, Cultural Beliefs, Amish Practices, Values that Affect Care: no  Food Allergies: NKFA  Factors Affecting Nutritional Intake: None identified at this time    Anthropometrics    Temp: 98 °F (36.7 °C)  Height Method: Stated  Height: 5' 8" (172.7 cm)  Height (inches): 68 in  Weight Method: Bed Scale  Weight: 97.8 kg (215 lb 9.8 oz)  Weight (lb): 215.61 lb  Ideal Body Weight (IBW), Male: 154 lb  % Ideal Body Weight, Male (lb): 142.58 %  BMI (Calculated): 32.8  BMI Grade: 30 - 34.9- obesity - grade I  Weight Loss: (none prior to admit)   "   Lab/Procedures/Meds    Pertinent Labs Reviewed: reviewed  Pertinent Labs Comments: 8/24: Na 133, BUN 50, Creat 5.7, GFR 11, Glu 153, Ca 7.1, pro tot 5.8, Alb 2.3  Pertinent Medications Reviewed: reviewed  Pertinent Medications Comments: calcitriol, calcium acetate, epoetin, furosemide, hydralazine, lactulose, magnesium oxide    Estimated/Assessed Needs    Weight Used For Calorie Calculations: 97.8 kg (215 lb 9.8 oz)  Energy Calorie Requirements (kcal): 1732 kcal  Energy Need Method: [place]-St Jeor  Protein Requirements: 117 g (1.2 g/kg)  Weight Used For Protein Calculations: 97.8 kg (215 lb 9.8 oz)  Fluid Requirements (mL): 1 ml/kcal or per MD  Estimated Fluid Requirement Method: RDA Method  RDA Method (mL): 1732  CHO Requirement: 215 g daily    Nutrition Prescription Ordered    Current Diet Order: diabetic/renal    Evaluation of Received Nutrient/Fluid Intake    I/O: 500/1000  Energy Calories Required: meeting needs  Protein Required: meeting needs  Fluid Required: meeting needs  Comments: Last BM 8/23  Tolerance: tolerating  % Intake of Estimated Energy Needs: 75 - 100 %  % Meal Intake: 50 - 75 %    Nutrition Risk    Level of Risk/Frequency of Follow-up: low(1x/week)     Assessment and Plan    Nutrition Problem  increased protein needs    Related to (etiology):   Receiving HD qMWF    Signs and Symptoms (as evidenced by):   Pt needs 1.2 g/kg of protein daily    Interventions (treatment strategy):  Carbohydrate modified diet  Nutrient modified diet- renal  Collaboration with other providers    Nutrition Diagnosis Status:   New    Monitor and Evaluation    Food and Nutrient Intake: energy intake, food and beverage intake  Food and Nutrient Adminstration: diet order  Knowledge/Beliefs/Attitudes: food and nutrition knowledge/skill  Physical Activity and Function: nutrition-related ADLs and IADLs  Anthropometric Measurements: weight, weight change  Biochemical Data, Medical Tests and Procedures: electrolyte and renal  panel, glucose/endocrine profile  Nutrition-Focused Physical Findings: overall appearance, skin     Malnutrition Assessment  Malnutrition Type: (NFPE not warrented at this time)  Energy Intake: (8/15-8/25: meal intake 50-75%)  Skin (Micronutrient): (lashon score-14)  Tongue (Micronutrient): (dental appliance present)       Weight Loss (Malnutrition): (8/25/20 97.8 kg)       Edema (Fluid Accumulation): 3-->moderate(+4 scrotum)   Subcutaneous Fat Loss (Final Summary): well nourished  Muscle Loss Evaluation (Final Summary): well nourished       Nutrition Follow-Up    RD Follow-up?: Yes

## 2020-08-25 NOTE — PLAN OF CARE
Recommendations    1. Continue diabetic, renal diet, encourage PO intake.   2. Weigh pt 3x/week after HD    Goals: 1. Consume >/=75% of meals by discharge  Nutrition Goal Status: new  Communication of RD Recs: (plan of care)

## 2020-08-25 NOTE — ASSESSMENT & PLAN NOTE
Patient with profound edema - anasarca up to abdomen - 2-3+ pitting after overnight diuresis + 3+ proteinuria - CKD stage 4 worsened from creatine baseline up to creatine of around 3-->5.8, severe activity intolerance - Nephrology consulted and following - diureses, strict I&Os, daily weight  -stop amlodipine - added labetalol per nephrology.  Also started on Hydralazine.  Increasing Creat with persistent hyperkalemia.  HD catheter placed and patient underwent HD on 8/20.  Will discuss need to continue IV Lasix with Nephrology.  Will need outpatient HD arrangements.  SW consulted.

## 2020-08-25 NOTE — PT/OT/SLP PROGRESS
"Occupational Therapy      Patient Name:  Miguel Moore   MRN:  [id number]    Patient not seen today secondary to Patient unwilling to participate 2* R sided pain/stiffness and constipation. Despite max education and encouragement from both RODRIGUEZ and PTA on benefits/importance mobilizing, pt continued to decline. Pt stated  "maybe later". Will follow-up as time allows.    2nd attempt 1440: Pt declined therapy. Pt reported he still does not feel well and "doesn't feel like it today".  Will follow-up as able.     GENEVA Mckenzie  8/25/2020  "

## 2020-08-25 NOTE — NURSING
Patients temperature returned to normal after IV Vancomycin, C&DB , ice packs  And Tylenol. Also CXR and Blood cultures done. Patient feeling better this morning.

## 2020-08-25 NOTE — PT/OT/SLP PROGRESS
Physical Therapy      Patient Name:  Miguel Moore   MRN:  [id number]    Patient not seen today secondary to Patient unwilling to participate. Pt c/o, constipated, stiff, and pain on whole right side of body. PT and OT encouraged and educated pt on importance of participating. Pt continued to decline, even on second attempt.  Will follow-up able.    Julián Milton, PTA

## 2020-08-25 NOTE — NURSING
Received bedside handoff from [place]. Patient free of pain. Discussed plan of care, pain management and safety measures with patient.

## 2020-08-25 NOTE — PROGRESS NOTES
[facility] Medicine  Progress Note    Patient Name: Miguel Moore  MRN: [id number]  Patient Class: IP- Inpatient   Admission Date: 8/15/2020  Length of Stay: 9 days  Attending Physician: Barrie Negro MD  Primary Care Provider: Raciel Raymond MD        Subjective:     Principal Problem:Nephrotic syndrome        HPI:  65 y.o. 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  States he was called on 8/13/2020, but did not come to the ED until today.  He reports mild edema and weight gain of 1-2 lb.  Denies fever, chills, cough, SOB, chest pain, palpitations, orthopnea, PND, dizziness, syncope, n/v/d, abd pain, or dysuria.  In the ED, Cr/BUN 5.3/64, K+ 5.4, H/H 7.5/22.7, CPK 1262, , UA shows proteinura 3+, renal US suggest chronic medical renal disease.  His Nephrologist is Dr. Roa, last visit Oct 2019.  Nephrology consult.  Placed in observation.    Overview/Hospital Course:  65 y.o. AAM with history significant for DMII, HTN, stroke, and CKD4 presents for evaluation of worsening CKD sent in by provider. Creatine = 2.0 1 year ago now with creatine 5.4 and potassium 5.8 worsening swelling and anasarca 2-3+ edema up to abdomen and flank, activity intolerance and orthopnea X 5 pillows. He is not on hemodialysis.  Amlodipine stopped.  Worsening BP and started on Hydralazine.  Nephrology consulted.  BP improved, but worsening Creat with persistent hyperkalemia.  Patient will need HD.  IR consulted for HD catheter.  Temporary HD catheter placed on 8/20 and patient underwent HD.  Catheter switched to tunneled catheter on 8/21.  Patient with right sided hemiplegia from previous stroke.  Deconditioned and PT/OT consulted.  Initial recommendation for SNF.  Patient will need outpatient HD  arrangements and SW consulted.  Patient started spiking fevers overnight 8/25.  Unremarkable CXR.  BCx and UA obtained.    Interval History: Febrile overnight.  Complaining of constipation.    Review of Systems   HENT: Negative for ear discharge and ear pain.    Eyes: Negative for discharge and itching.   Endocrine: Negative for cold intolerance and heat intolerance.   Neurological: Negative for seizures and syncope.     Objective:     Vital Signs (Most Recent):  Temp: 98 °F (36.7 °C) (08/25/20 1135)  Pulse: 84 (08/25/20 1135)  Resp: 18 (08/25/20 1135)  BP: 129/86 (08/25/20 1135)  SpO2: 96 % (08/25/20 1135) Vital Signs (24h Range):  Temp:  [98 °F (36.7 °C)-102.2 °F (39 °C)] 98 °F (36.7 °C)  Pulse:  [] 84  Resp:  [16-19] 18  SpO2:  [96 %-98 %] 96 %  BP: (120-176)/(60-86) 129/86     Weight: 97.8 kg (215 lb 9.8 oz)  Body mass index is 32.78 kg/m².  No intake or output data in the 24 hours ending 08/25/20 1238   Physical Exam  Vitals signs and nursing note reviewed.   Constitutional:       General: He is not in acute distress.     Appearance: He is well-developed.   HENT:      Head: Normocephalic and atraumatic.      Right Ear: External ear normal.      Left Ear: External ear normal.      Nose: Nose normal.   Eyes:      Conjunctiva/sclera: Conjunctivae normal.      Pupils: Pupils are equal, round, and reactive to light.   Neck:      Musculoskeletal: Normal range of motion and neck supple.   Cardiovascular:      Rate and Rhythm: Normal rate and regular rhythm.   Pulmonary:      Effort: Pulmonary effort is normal. No respiratory distress.      Breath sounds: No wheezing.   Abdominal:      General: Bowel sounds are normal. There is no distension.      Palpations: Abdomen is soft.      Tenderness: There is no abdominal tenderness.      Comments: No palpable hepatomegaly or splenomegaly   Musculoskeletal:         General: Swelling present. No tenderness.   Skin:     General: Skin is warm and dry.   Neurological:       Mental Status: He is alert and oriented to person, place, and time.      Comments: Right sided hemiplegia   Psychiatric:         Thought Content: Thought content normal.         Significant Labs:   BMP:   Recent Labs   Lab 08/24/20  0049   *   *   K 4.8      CO2 20*   BUN 50*   CREATININE 5.7*   CALCIUM 7.1*     CBC:   Recent Labs   Lab 08/24/20  0937   WBC 8.99   HGB 8.7*   HCT 26.5*          Significant Imaging: I have reviewed all pertinent imaging results/findings within the past 24 hours.      Assessment/Plan:      * Nephrotic syndrome  Patient with profound edema - anasarca up to abdomen - 2-3+ pitting after overnight diuresis + 3+ proteinuria - CKD stage 4 worsened from creatine baseline up to creatine of around 3-->5.8, severe activity intolerance - Nephrology consulted and following - diureses, strict I&Os, daily weight  -stop amlodipine - added labetalol per nephrology.  Also started on Hydralazine.  Increasing Creat with persistent hyperkalemia.  HD catheter placed and patient underwent HD on 8/20.  Will discuss need to continue IV Lasix with Nephrology.  Will need outpatient HD arrangements.  SW consulted.    Fever  Febrile overnight.  No obvious source of infection.  Unremarkable CXR.  Will check UA.   BCx obtained. Negative so far. Patient with recent HD catheter placement.        Anemia  Anemia of CKD  H/H stable since admission with no evidence of bleeding.        Enlarged prostate  Continue home flomax - voiding well - strict I&Os      Hypocalcemia  Started on calcitrol 0.25 mcg and calcium acetate 667 po tid AC    Anasarca  See above      Type 2 diabetes mellitus with diabetic neuropathy, with long-term current use of insulin        CKD (chronic kidney disease) stage 4, GFR 15-29 ml/min with proteinuria  Acute on chronic CKD stage 4  Appreciate Nephrology input.  As above.    Hemiplegia and hemiparesis following cerebral infarction affecting right dominant side  Stable, no  acute issue, continue plavix/statin.  PT/OT evaluation.  Initial recommendation for SNF.  SW consulted.    Seizure disorder as sequela of cerebrovascular accident  Continue home regimen of phenytoin  Subtherapeutic level.  No seizures.  Will continue current regimen for now.    Benign non-nodular prostatic hyperplasia without lower urinary tract symptoms  Continue home regimen of tamsulosin     Controlled type 2 diabetes mellitus with stage 4 chronic kidney disease, with long-term current use of insulin  Last HgbA1c   Lab Results   Component Value Date    HGBA1C 5.5 08/13/2020     Hold oral antihyperglycemics while inpatient  PRN sliding scale insulin  ACHS glucose monitoring   ADA diet     Pure hypercholesterolemia  Continue statin    Benign essential HTN; ACEI hyperK  Initially uncontrolled.  Norvasc stopped with lower extremity edema.  Started on Hydralazine.  Increase as needed.  Improving.      VTE Risk Mitigation (From admission, onward)         Ordered     heparin (porcine) injection 5,000 Units  As needed (PRN)      08/20/20 1251     IP VTE HIGH RISK PATIENT  Once      08/15/20 1505     Place sequential compression device  Until discontinued      08/15/20 1505                Discharge Planning   GARY:      Code Status: Full Code   Is the patient medically ready for discharge?:     Reason for patient still in hospital (select all that apply): Patient trending condition  Discharge Plan A: Skilled Nursing Facility   Discharge Delays: None known at this time              Barrie Negro MD  Department of Hospital Medicine   [facility]

## 2020-08-25 NOTE — SIGNIFICANT EVENT
"Notified of new fever in the patient with recent dialysis catheter placement.    /60 (BP Location: Left leg, Patient Position: Lying)   Pulse 100   Temp (!) 101.2 °F (38.4 °C) (Oral)   Resp 16   Ht 5' 8" (1.727 m)   Wt 98 kg (216 lb 0.8 oz)   SpO2 96%   BMI 32.85 kg/m²     Will draw blood cultures  Check CXR  Empiric vanco until sure blood cultures negative  "

## 2020-08-25 NOTE — NURSING
Temp 102.2 orally. Ice packs to axilla rupinder and neck. Tylenol prn given po along with 2 cups of cold water. Secure chat to Dr Robledo-watch patient-no new orders

## 2020-08-25 NOTE — PROGRESS NOTES
Pharmacokinetic Initial Assessment: IV Vancomycin    Assessment/Plan:    Initiate intravenous vancomycin with loading dose of 2000 mg once with subsequent doses when random concentrations are less than 20 mcg/mL  Desired empiric serum trough concentration is 10 to 20 mcg/mL  Draw vancomycin random level on 8/26/2020 at 0400.  Pharmacy will continue to follow and monitor vancomycin.      Please contact pharmacy at extension [phone] with any questions regarding this assessment.     Thank you for the consult,   Hipolito Sandy       Patient brief summary:  Miguel Moore is a 65 y.o. male initiated on antimicrobial therapy with IV Vancomycin for treatment of suspected bacteremia    Drug Allergies:   Review of patient's allergies indicates:   Allergen Reactions    Ace inhibitors      Hyperkalemia 8/2018    Penicillins Hives    Tizanidine      Felt hot       Actual Body Weight:   98 kg    Renal Function:   Estimated Creatinine Clearance: 14.5 mL/min (A) (based on SCr of 5.7 mg/dL (H)).,     Dialysis Method (if applicable):  intermittent HD    CBC (last 72 hours):  Recent Labs   Lab Result Units 08/22/20  0517 08/24/20  0937   WBC K/uL 6.22 8.99   Hemoglobin g/dL 8.2* 8.7*   Hematocrit % 24.7* 26.5*   Platelets K/uL 166 160   Gran% % 76.5* 87.4*   Lymph% % 9.6* 3.3*   Mono% % 12.5 8.1   Eosinophil% % 0.6 0.4   Basophil% % 0.6 0.2   Differential Method  Automated Automated       Metabolic Panel (last 72 hours):  Recent Labs   Lab Result Units 08/22/20  0517 08/23/20  0512 08/24/20  0049   Sodium mmol/L 133* 132* 133*   Potassium mmol/L 5.0 4.7 4.8   Chloride mmol/L 102 100 100   CO2 mmol/L 19* 21* 20*   Glucose mg/dL 134* 161* 153*   BUN, Bld mg/dL 59* 42* 50*   Creatinine mg/dL 6.1* 4.8* 5.7*   Albumin g/dL 2.6* 2.3* 2.3*   Total Bilirubin mg/dL 0.2 0.2 0.2   Alkaline Phosphatase U/L 99 92 88   AST U/L 20 16 15   ALT U/L 18 22 17       Drug levels (last 3 results):  No results for input(s): VANCOMYCINRA, VANCOMYCINPE,  VANCOMYCINTR in the last 72 hours.    Microbiologic Results:  Microbiology Results (last 7 days)       Procedure Component Value Units Date/Time    Blood culture [[id number]] Collected: 08/25/20 0200    Order Status: Sent Specimen: Blood from Peripheral, Right Wrist Updated: 08/25/20 0201

## 2020-08-25 NOTE — SUBJECTIVE & OBJECTIVE
Interval History: Febrile overnight.  Complaining of constipation.    Review of Systems   HENT: Negative for ear discharge and ear pain.    Eyes: Negative for discharge and itching.   Endocrine: Negative for cold intolerance and heat intolerance.   Neurological: Negative for seizures and syncope.     Objective:     Vital Signs (Most Recent):  Temp: 98 °F (36.7 °C) (08/25/20 1135)  Pulse: 84 (08/25/20 1135)  Resp: 18 (08/25/20 1135)  BP: 129/86 (08/25/20 1135)  SpO2: 96 % (08/25/20 1135) Vital Signs (24h Range):  Temp:  [98 °F (36.7 °C)-102.2 °F (39 °C)] 98 °F (36.7 °C)  Pulse:  [] 84  Resp:  [16-19] 18  SpO2:  [96 %-98 %] 96 %  BP: (120-176)/(60-86) 129/86     Weight: 97.8 kg (215 lb 9.8 oz)  Body mass index is 32.78 kg/m².  No intake or output data in the 24 hours ending 08/25/20 1238   Physical Exam  Vitals signs and nursing note reviewed.   Constitutional:       General: He is not in acute distress.     Appearance: He is well-developed.   HENT:      Head: Normocephalic and atraumatic.      Right Ear: External ear normal.      Left Ear: External ear normal.      Nose: Nose normal.   Eyes:      Conjunctiva/sclera: Conjunctivae normal.      Pupils: Pupils are equal, round, and reactive to light.   Neck:      Musculoskeletal: Normal range of motion and neck supple.   Cardiovascular:      Rate and Rhythm: Normal rate and regular rhythm.   Pulmonary:      Effort: Pulmonary effort is normal. No respiratory distress.      Breath sounds: No wheezing.   Abdominal:      General: Bowel sounds are normal. There is no distension.      Palpations: Abdomen is soft.      Tenderness: There is no abdominal tenderness.      Comments: No palpable hepatomegaly or splenomegaly   Musculoskeletal:         General: Swelling present. No tenderness.   Skin:     General: Skin is warm and dry.   Neurological:      Mental Status: He is alert and oriented to person, place, and time.      Comments: Right sided hemiplegia   Psychiatric:          Thought Content: Thought content normal.         Significant Labs:   BMP:   Recent Labs   Lab 08/24/20  0049   *   *   K 4.8      CO2 20*   BUN 50*   CREATININE 5.7*   CALCIUM 7.1*     CBC:   Recent Labs   Lab 08/24/20  0937   WBC 8.99   HGB 8.7*   HCT 26.5*          Significant Imaging: I have reviewed all pertinent imaging results/findings within the past 24 hours.

## 2020-08-25 NOTE — NURSING
Received bedside handoff from De Leon. Discussed plan of care, pain management and safety measures with patient. Resting comfortably at present time

## 2020-08-25 NOTE — ASSESSMENT & PLAN NOTE
Febrile overnight.  No obvious source of infection.  Unremarkable CXR.  Will check UA.   BCx obtained. Negative so far. Patient with recent HD catheter placement.

## 2020-08-26 PROBLEM — E11.40 TYPE 2 DIABETES MELLITUS WITH DIABETIC NEUROPATHY, WITH LONG-TERM CURRENT USE OF INSULIN: Status: RESOLVED | Noted: 2018-05-10 | Resolved: 2020-08-26

## 2020-08-26 PROBLEM — Z79.4 TYPE 2 DIABETES MELLITUS WITH DIABETIC NEUROPATHY, WITH LONG-TERM CURRENT USE OF INSULIN: Status: RESOLVED | Noted: 2018-05-10 | Resolved: 2020-08-26

## 2020-08-26 PROBLEM — N18.6 ESRD (END STAGE RENAL DISEASE): Status: ACTIVE | Noted: 2017-03-22

## 2020-08-26 LAB
ALBUMIN SERPL BCP-MCNC: 2 G/DL (ref 3.5–5.2)
ALP SERPL-CCNC: 74 U/L (ref 55–135)
ALT SERPL W/O P-5'-P-CCNC: 22 U/L (ref 10–44)
ANION GAP SERPL CALC-SCNC: 9 MMOL/L (ref 8–16)
AST SERPL-CCNC: 20 U/L (ref 10–40)
BASOPHILS # BLD AUTO: 0.01 K/UL (ref 0–0.2)
BASOPHILS NFR BLD: 0.2 % (ref 0–1.9)
BILIRUB SERPL-MCNC: 0.2 MG/DL (ref 0.1–1)
BUN SERPL-MCNC: 57 MG/DL (ref 8–23)
CALCIUM SERPL-MCNC: 7.1 MG/DL (ref 8.7–10.5)
CHLORIDE SERPL-SCNC: 95 MMOL/L (ref 95–110)
CO2 SERPL-SCNC: 24 MMOL/L (ref 23–29)
CREAT SERPL-MCNC: 6.4 MG/DL (ref 0.5–1.4)
DIFFERENTIAL METHOD: ABNORMAL
EOSINOPHIL # BLD AUTO: 0.1 K/UL (ref 0–0.5)
EOSINOPHIL NFR BLD: 2.3 % (ref 0–8)
ERYTHROCYTE [DISTWIDTH] IN BLOOD BY AUTOMATED COUNT: 13.1 % (ref 11.5–14.5)
ERYTHROCYTE [DISTWIDTH] IN BLOOD BY AUTOMATED COUNT: 13.2 % (ref 11.5–14.5)
EST. GFR  (AFRICAN AMERICAN): 10 ML/MIN/1.73 M^2
EST. GFR  (NON AFRICAN AMERICAN): 8 ML/MIN/1.73 M^2
GLUCOSE SERPL-MCNC: 174 MG/DL (ref 70–110)
HCT VFR BLD AUTO: 22.2 % (ref 40–54)
HCT VFR BLD AUTO: 24.5 % (ref 40–54)
HGB BLD-MCNC: 7.4 G/DL (ref 14–18)
HGB BLD-MCNC: 8.1 G/DL (ref 14–18)
IMM GRANULOCYTES # BLD AUTO: 0.02 K/UL (ref 0–0.04)
IMM GRANULOCYTES NFR BLD AUTO: 0.5 % (ref 0–0.5)
LYMPHOCYTES # BLD AUTO: 0.5 K/UL (ref 1–4.8)
LYMPHOCYTES NFR BLD: 12 % (ref 18–48)
MCH RBC QN AUTO: 24.9 PG (ref 27–31)
MCH RBC QN AUTO: 25.1 PG (ref 27–31)
MCHC RBC AUTO-ENTMCNC: 33.1 G/DL (ref 32–36)
MCHC RBC AUTO-ENTMCNC: 33.3 G/DL (ref 32–36)
MCV RBC AUTO: 75 FL (ref 82–98)
MCV RBC AUTO: 75 FL (ref 82–98)
MONOCYTES # BLD AUTO: 0.7 K/UL (ref 0.3–1)
MONOCYTES NFR BLD: 15 % (ref 4–15)
NEUTROPHILS # BLD AUTO: 3.1 K/UL (ref 1.8–7.7)
NEUTROPHILS NFR BLD: 70 % (ref 38–73)
NRBC BLD-RTO: 0 /100 WBC
PLATELET # BLD AUTO: 141 K/UL (ref 150–350)
PLATELET # BLD AUTO: 155 K/UL (ref 150–350)
PMV BLD AUTO: 10.5 FL (ref 9.2–12.9)
PMV BLD AUTO: 9.9 FL (ref 9.2–12.9)
POCT GLUCOSE: 179 MG/DL (ref 70–110)
POCT GLUCOSE: 193 MG/DL (ref 70–110)
POCT GLUCOSE: 213 MG/DL (ref 70–110)
POCT GLUCOSE: 220 MG/DL (ref 70–110)
POTASSIUM SERPL-SCNC: 4.5 MMOL/L (ref 3.5–5.1)
PROCALCITONIN SERPL IA-MCNC: 0.56 NG/ML
PROT SERPL-MCNC: 5 G/DL (ref 6–8.4)
RBC # BLD AUTO: 2.95 M/UL (ref 4.6–6.2)
RBC # BLD AUTO: 3.25 M/UL (ref 4.6–6.2)
SODIUM SERPL-SCNC: 128 MMOL/L (ref 136–145)
VANCOMYCIN SERPL-MCNC: 17.7 UG/ML
WBC # BLD AUTO: 4.41 K/UL (ref 3.9–12.7)
WBC # BLD AUTO: 5.31 K/UL (ref 3.9–12.7)

## 2020-08-26 PROCEDURE — 63600175 PHARM REV CODE 636 W HCPCS: Performed by: HOSPITALIST

## 2020-08-26 PROCEDURE — 85025 COMPLETE CBC W/AUTO DIFF WBC: CPT

## 2020-08-26 PROCEDURE — 80053 COMPREHEN METABOLIC PANEL: CPT

## 2020-08-26 PROCEDURE — 25000003 PHARM REV CODE 250: Performed by: INTERNAL MEDICINE

## 2020-08-26 PROCEDURE — 97535 SELF CARE MNGMENT TRAINING: CPT | Mod: CO

## 2020-08-26 PROCEDURE — 85027 COMPLETE CBC AUTOMATED: CPT

## 2020-08-26 PROCEDURE — 80202 ASSAY OF VANCOMYCIN: CPT

## 2020-08-26 PROCEDURE — 25000003 PHARM REV CODE 250: Performed by: HOSPITALIST

## 2020-08-26 PROCEDURE — 36415 COLL VENOUS BLD VENIPUNCTURE: CPT

## 2020-08-26 PROCEDURE — 97530 THERAPEUTIC ACTIVITIES: CPT | Mod: CQ

## 2020-08-26 PROCEDURE — 21400001 HC TELEMETRY ROOM

## 2020-08-26 PROCEDURE — 63600175 PHARM REV CODE 636 W HCPCS: Performed by: INTERNAL MEDICINE

## 2020-08-26 PROCEDURE — 84145 PROCALCITONIN (PCT): CPT

## 2020-08-26 RX ADMIN — MAGNESIUM OXIDE TAB 400 MG (241.3 MG ELEMENTAL MG) 400 MG: 400 (241.3 MG) TAB at 08:08

## 2020-08-26 RX ADMIN — CALCIUM ACETATE 667 MG: 667 CAPSULE ORAL at 08:08

## 2020-08-26 RX ADMIN — HEPARIN SODIUM 5000 UNITS: 5000 INJECTION INTRAVENOUS; SUBCUTANEOUS at 11:08

## 2020-08-26 RX ADMIN — PHENYTOIN SODIUM 100 MG: 100 CAPSULE ORAL at 08:08

## 2020-08-26 RX ADMIN — TAMSULOSIN HYDROCHLORIDE 0.8 MG: 0.4 CAPSULE ORAL at 08:08

## 2020-08-26 RX ADMIN — CALCITRIOL CAPSULES 0.25 MCG 0.25 MCG: 0.25 CAPSULE ORAL at 08:08

## 2020-08-26 RX ADMIN — ATORVASTATIN CALCIUM 80 MG: 40 TABLET, FILM COATED ORAL at 08:08

## 2020-08-26 RX ADMIN — HEPARIN SODIUM 5000 UNITS: 5000 INJECTION INTRAVENOUS; SUBCUTANEOUS at 05:08

## 2020-08-26 RX ADMIN — CLOPIDOGREL 75 MG: 75 TABLET, FILM COATED ORAL at 08:08

## 2020-08-26 RX ADMIN — HYDRALAZINE HYDROCHLORIDE 25 MG: 25 TABLET, FILM COATED ORAL at 05:08

## 2020-08-26 RX ADMIN — TRAZODONE HYDROCHLORIDE 50 MG: 50 TABLET ORAL at 08:08

## 2020-08-26 RX ADMIN — LABETALOL HYDROCHLORIDE 100 MG: 100 TABLET, FILM COATED ORAL at 08:08

## 2020-08-26 RX ADMIN — INSULIN ASPART 2 UNITS: 100 INJECTION, SOLUTION INTRAVENOUS; SUBCUTANEOUS at 11:08

## 2020-08-26 RX ADMIN — DIAZEPAM 2 MG: 2 TABLET ORAL at 08:08

## 2020-08-26 RX ADMIN — HEPARIN SODIUM 5000 UNITS: 5000 INJECTION INTRAVENOUS; SUBCUTANEOUS at 06:08

## 2020-08-26 RX ADMIN — FUROSEMIDE 80 MG: 40 TABLET ORAL at 08:08

## 2020-08-26 RX ADMIN — VANCOMYCIN HYDROCHLORIDE 500 MG: 500 INJECTION, POWDER, LYOPHILIZED, FOR SOLUTION INTRAVENOUS at 08:08

## 2020-08-26 NOTE — ASSESSMENT & PLAN NOTE
Admitted to inpatient status  On admit had anasarca up to abdomen.    Previously had CKD4 with baseline Cr of 3 but was 5.8 on admit.    Noted nephrotic proteinuria  Continue strict ins/outs and daily weights  Continue diuresis with lasix  Continue dialysis per nephrology  Dr Menard on board and input appreciated  Outpatient HD has been arranged  Likely discharge tomorrow if remains afebrile and cultures remain negative.

## 2020-08-26 NOTE — PROGRESS NOTES
Miguel Moore is a 65 y.o. male patient.    Follow for ANA, CKD stg 4, dialysis    No new c/o, feeling better  Comfortable    Scheduled Meds:   atorvastatin  80 mg Oral Daily    calcitRIOL  0.25 mcg Oral Daily    calcium acetate(phosphat bind)  667 mg Oral TID WM    clopidogreL  75 mg Oral Daily    epoetin marisol-epbx  10,000 Units Subcutaneous Every Tues, Thurs, Sat    furosemide  80 mg Oral Daily    heparin (porcine)  5,000 Units Subcutaneous Q8H    hydrALAZINE  25 mg Oral Q8H    labetaloL  100 mg Oral Q12H    lactulose  30 g Oral Once    magnesium oxide  400 mg Oral BID    phenytoin  100 mg Oral TID    tamsulosin  2 capsule Oral Daily    trazodone  50 mg Oral QHS       Review of patient's allergies indicates:   Allergen Reactions    Ace inhibitors      Hyperkalemia 8/2018    Penicillins Hives    Tizanidine      Felt hot         Vital Signs Range (Last 24H):  Temp:  [97.8 °F (36.6 °C)-99 °F (37.2 °C)]   Pulse:  [82-96]   Resp:  [17-18]   BP: (110-175)/(51-86)   SpO2:  [96 %-100 %]     I & O (Last 24H):    Intake/Output Summary (Last 24 hours) at 8/26/2020 1114  Last data filed at 8/26/2020 0600  Gross per 24 hour   Intake 400 ml   Output 200 ml   Net 200 ml           Physical Exam:  General appearance: well developed, well nourished, no distress  Lungs:  clear to auscultation bilaterally and normal respiratory effort  Heart: regular rate and rhythm  Abdomen: soft, non-tender non-distented; bowel sounds normal; no masses,  no organomegaly  Extremities: edema (+)    Laboratory:  CBC:   Recent Labs   Lab 08/26/20  0849   WBC 5.31   RBC 3.25*   HGB 8.1*   HCT 24.5*      MCV 75*   MCH 24.9*   MCHC 33.1     CMP:   Recent Labs   Lab 08/26/20  0131   *   CALCIUM 7.1*   ALBUMIN 2.0*   PROT 5.0*   *   K 4.5   CO2 24   CL 95   BUN 57*   CREATININE 6.4*   ALKPHOS 74   ALT 22   AST 20   BILITOT 0.2       Imp/Plan    ANA on CKD stg 4 - ESRD, scheduled for HD later today  HTN  DM type  2  Fever - afebrile for last 24 hr, cultures (-)  Proteinuria  Anemia of CKD    HD later today as ordered  Continue HD qMWF  We'll follow for dialysis      Nils Menard  8/26/2020

## 2020-08-26 NOTE — PT/OT/SLP PROGRESS
Physical Therapy Treatment    Patient Name:  Miguel Moore   MRN:  [id number]    Recommendations:     Discharge Recommendations:  nursing facility, skilled   Discharge Equipment Recommendations: bedside commode   Barriers to discharge: decreased mobility, increased weakness, increased fall risk (pt is not as PLOF)     Assessment:     Miguel Moore is a 65 y.o. male admitted with a medical diagnosis of Nephrotic syndrome.  He presents with the following impairments/functional limitations:  weakness, impaired endurance, impaired self care skills, impaired functional mobilty, pain, decreased safety awareness, decreased lower extremity function, impaired balance, decreased upper extremity function, gait instability, decreased ROM, impaired coordination, decreased coordination, impaired sensation, edema . Pt will benefit from further skilled therapy in order to return to PLOF. There is expectation of returning to prior level of function and avoiding readmission.  Pt is at high risk of unplanned readmission, decline functional status and fall risk .      Rehab Prognosis: Fair; patient would benefit from acute skilled PT services to address these deficits and reach maximum level of function.    Recent Surgery: Procedure(s) (LRB):  EGD (ESOPHAGOGASTRODUODENOSCOPY) (N/A) 9 Days Post-Op    Plan:     During this hospitalization, patient to be seen 5 x/week to address the identified rehab impairments via therapeutic activities, gait training, therapeutic exercises, neuromuscular re-education and progress toward the following goals:    · Plan of Care Expires:  08/26/20    Subjective     Chief Complaint: weakness, BLE edema,  fatigue and R side pain   Patient/Family Comments/goals: pt agreeable to therapy with encouragement   Pain/Comfort:  · Pain Rating 1: 8/10  · Location - Side 1: Right  · Location - Orientation 1: generalized  · Pain Addressed 1: Pre-medicate for activity, Cessation of Activity, Nurse notified  · Pain  Rating Post-Intervention 1: 8/10      Objective:     Communicated with nurse  prior to session.  Patient found HOB elevated with bed alarm, telemetry upon PT entry to room.     General Precautions: Standard, fall   Orthopedic Precautions:N/A   Braces: N/A     · Functional Mobility: pt required more assistance for all functional mobility however  improved sitting balance upon sitting EOB.   · Bed Mobility:     · Rolling Left:  dependence  · Scooting: dependence and of 2 persons  · Supine to Sit: dependence and of 2 persons, HOB elevated   · Sit to Supine: dependence and of 2 persons  · Balance:  Pt with fair in static sitting balance (SBA), fair - in dynamic sitting. Pt tolerated sitting EOB x ~ 20 min to perform ADL's with OT and for BLE ex's. Pt required MIN A for dynamic sitting.  V/T cues for cores activations to improve balance and trunk stability .         AM-PAC 6 CLICK MOBILITY  Turning over in bed (including adjusting bedclothes, sheets and blankets)?: 2  Sitting down on and standing up from a chair with arms (e.g., wheelchair, bedside commode, etc.): 2  Moving from lying on back to sitting on the side of the bed?: 2  Moving to and from a bed to a chair (including a wheelchair)?: 2  Need to walk in hospital room?: 2  Climbing 3-5 steps with a railing?: 1  Basic Mobility Total Score: 11       Therapeutic Activities and Exercises:   Pt performed seated BLE 10 reps (AAROM -AROM BLE ) :   AP, LAQ, HS,  Hip abd/add  , Hip flexion. V/T's cues for technique and sequence.     Patient left with bed in chair position with BUE/BLE elevated on pillow all lines intact, call button in reach, bed alarm on, nurse notified and [employer] present..    GOALS:   Multidisciplinary Problems     Physical Therapy Goals        Problem: Physical Therapy Goal    Goal Priority Disciplines Outcome Goal Variances Interventions   Physical Therapy Goal     PT, PT/OT Ongoing, Progressing     Description: Goals to be met by: 8/26    Patient will  increase functional independence with mobility by performin. Supine to sit with Stand-by Assistance  2. Sit to supine with Stand-by Assistance  3. Sit to stand transfer with Stand-by Assistance using Quad Cane.  4. Bed to chair transfer with Minimal Assistance using Quad Cane and stand pivot transfer.  5. Bed to Wheelchair transfer with Stand-by Assistance and squat pivot transfer.  6. Gait x 50 feet with Minimal Assistance using Quad Cane.   7. Stand for 20 minutes with Stand-by Assistance using Quad Cane  8. Lower extremity exercise program x20 reps per handout, with assistance as needed                     Time Tracking:     PT Received On: 20  PT Start Time: 1143     PT Stop Time: 1215  PT Total Time (min): 32 min     Billable Minutes: Therapeutic Activity 16 and Total Time 32 min co-treat with OT     Treatment Type: Treatment  PT/PTA: PTA     PTA Visit Number: 3     Tram T Rose, PTA  2020

## 2020-08-26 NOTE — SUBJECTIVE & OBJECTIVE
Interval History: No acute events overnight.  Still profoundly weak and with significant edema up to his thighs.  Vasquez cp and sob.    Review of Systems   Constitutional: Positive for activity change. Negative for appetite change and fever.   HENT: Negative for ear discharge and ear pain.    Eyes: Negative for discharge and itching.   Respiratory: Negative for cough and chest tightness.    Cardiovascular: Positive for leg swelling. Negative for chest pain.   Gastrointestinal: Negative for abdominal distention and abdominal pain.   Endocrine: Negative for cold intolerance and heat intolerance.   Genitourinary: Negative for difficulty urinating and dysuria.   Musculoskeletal: Negative for arthralgias and back pain.   Neurological: Positive for weakness. Negative for seizures and syncope.   Psychiatric/Behavioral: Positive for dysphoric mood. Negative for agitation and confusion.     Objective:     Vital Signs (Most Recent):  Temp: 98.5 °F (36.9 °C) (08/26/20 1223)  Pulse: 78 (08/26/20 1223)  Resp: 17 (08/26/20 1223)  BP: (!) 124/53 (08/26/20 1223)  SpO2: 97 % (08/26/20 1223) Vital Signs (24h Range):  Temp:  [97.8 °F (36.6 °C)-99 °F (37.2 °C)] 98.5 °F (36.9 °C)  Pulse:  [78-96] 78  Resp:  [17-18] 17  SpO2:  [97 %-100 %] 97 %  BP: (110-175)/(51-75) 124/53     Weight: 99.2 kg (218 lb 11.1 oz)  Body mass index is 33.25 kg/m².    Intake/Output Summary (Last 24 hours) at 8/26/2020 1515  Last data filed at 8/26/2020 0600  Gross per 24 hour   Intake 200 ml   Output 200 ml   Net 0 ml      Physical Exam  Vitals signs and nursing note reviewed.   Constitutional:       General: He is not in acute distress.     Appearance: He is well-developed.   HENT:      Head: Normocephalic and atraumatic.      Right Ear: External ear normal.      Left Ear: External ear normal.      Nose: Nose normal.   Eyes:      Conjunctiva/sclera: Conjunctivae normal.      Pupils: Pupils are equal, round, and reactive to light.   Neck:      Musculoskeletal:  Normal range of motion and neck supple.   Cardiovascular:      Rate and Rhythm: Normal rate and regular rhythm.   Pulmonary:      Effort: Pulmonary effort is normal. No respiratory distress.      Breath sounds: No wheezing.   Abdominal:      General: Bowel sounds are normal. There is no distension.      Palpations: Abdomen is soft.      Tenderness: There is no abdominal tenderness.      Comments: No palpable hepatomegaly or splenomegaly   Musculoskeletal:         General: Swelling present. No tenderness.      Comments: Deep edema up to both thighs   Skin:     General: Skin is warm and dry.   Neurological:      Mental Status: He is alert and oriented to person, place, and time.      Comments: Right sided hemiplegia   Psychiatric:         Thought Content: Thought content normal.      Comments: Dysphoric mood         Significant Labs: All pertinent labs within the past 24 hours have been reviewed.    Significant Imaging: I have reviewed and interpreted all pertinent imaging results/findings within the past 24 hours.

## 2020-08-26 NOTE — PROGRESS NOTES
[facility] Hospital Medicine  Progress Note    Patient Name: Miguel Moore  MRN: [id number]  Patient Class: IP- Inpatient   Admission Date: 8/15/2020  Length of Stay: 10 days  Attending Physician: Clement Wolfe MD  Primary Care Provider: Raciel Raymond MD        Subjective:     Principal Problem:ESRD (end stage renal disease)        HPI:  65 y.o. 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  States he was called on 8/13/2020, but did not come to the ED until today.  He reports mild edema and weight gain of 1-2 lb.  Denies fever, chills, cough, SOB, chest pain, palpitations, orthopnea, PND, dizziness, syncope, n/v/d, abd pain, or dysuria.  In the ED, Cr/BUN 5.3/64, K+ 5.4, H/H 7.5/22.7, CPK 1262, , UA shows proteinura 3+, renal US suggest chronic medical renal disease.  His Nephrologist is Dr. Roa, last visit Oct 2019.  Nephrology consult.  Placed in observation.    Overview/Hospital Course:  65 y.o. AAM with history significant for DMII, HTN, stroke, and CKD4 presents for evaluation of worsening CKD sent in by provider. Creatine = 2.0 1 year ago now with creatine 5.4 and potassium 5.8 worsening swelling and anasarca 2-3+ edema up to abdomen and flank, activity intolerance and orthopnea X 5 pillows. He is not on hemodialysis.  Amlodipine stopped.  Worsening BP and started on Hydralazine.  Nephrology consulted.  BP improved, but worsening Creat with persistent hyperkalemia.  Patient will need HD.  IR consulted for HD catheter.  Temporary HD catheter placed on 8/20 and patient underwent HD.  Catheter switched to tunneled catheter on 8/21.  Patient with right sided hemiplegia from previous stroke.  Deconditioned and PT/OT consulted.  Initial recommendation for SNF.  Patient will need outpatient HD  arrangements and SW consulted.  Patient started spiking fevers overnight 8/25.  Unremarkable CXR.  BCx and UA obtained.    Interval History: No acute events overnight.  Still profoundly weak and with significant edema up to his thighs.  Vasquez cp and sob.    Review of Systems   Constitutional: Positive for activity change. Negative for appetite change and fever.   HENT: Negative for ear discharge and ear pain.    Eyes: Negative for discharge and itching.   Respiratory: Negative for cough and chest tightness.    Cardiovascular: Positive for leg swelling. Negative for chest pain.   Gastrointestinal: Negative for abdominal distention and abdominal pain.   Endocrine: Negative for cold intolerance and heat intolerance.   Genitourinary: Negative for difficulty urinating and dysuria.   Musculoskeletal: Negative for arthralgias and back pain.   Neurological: Positive for weakness. Negative for seizures and syncope.   Psychiatric/Behavioral: Positive for dysphoric mood. Negative for agitation and confusion.     Objective:     Vital Signs (Most Recent):  Temp: 98.5 °F (36.9 °C) (08/26/20 1223)  Pulse: 78 (08/26/20 1223)  Resp: 17 (08/26/20 1223)  BP: (!) 124/53 (08/26/20 1223)  SpO2: 97 % (08/26/20 1223) Vital Signs (24h Range):  Temp:  [97.8 °F (36.6 °C)-99 °F (37.2 °C)] 98.5 °F (36.9 °C)  Pulse:  [78-96] 78  Resp:  [17-18] 17  SpO2:  [97 %-100 %] 97 %  BP: (110-175)/(51-75) 124/53     Weight: 99.2 kg (218 lb 11.1 oz)  Body mass index is 33.25 kg/m².    Intake/Output Summary (Last 24 hours) at 8/26/2020 1515  Last data filed at 8/26/2020 0600  Gross per 24 hour   Intake 200 ml   Output 200 ml   Net 0 ml      Physical Exam  Vitals signs and nursing note reviewed.   Constitutional:       General: He is not in acute distress.     Appearance: He is well-developed.   HENT:      Head: Normocephalic and atraumatic.      Right Ear: External ear normal.      Left Ear: External ear normal.      Nose: Nose normal.   Eyes:       Conjunctiva/sclera: Conjunctivae normal.      Pupils: Pupils are equal, round, and reactive to light.   Neck:      Musculoskeletal: Normal range of motion and neck supple.   Cardiovascular:      Rate and Rhythm: Normal rate and regular rhythm.   Pulmonary:      Effort: Pulmonary effort is normal. No respiratory distress.      Breath sounds: No wheezing.   Abdominal:      General: Bowel sounds are normal. There is no distension.      Palpations: Abdomen is soft.      Tenderness: There is no abdominal tenderness.      Comments: No palpable hepatomegaly or splenomegaly   Musculoskeletal:         General: Swelling present. No tenderness.      Comments: Deep edema up to both thighs   Skin:     General: Skin is warm and dry.   Neurological:      Mental Status: He is alert and oriented to person, place, and time.      Comments: Right sided hemiplegia   Psychiatric:         Thought Content: Thought content normal.      Comments: Dysphoric mood         Significant Labs: All pertinent labs within the past 24 hours have been reviewed.    Significant Imaging: I have reviewed and interpreted all pertinent imaging results/findings within the past 24 hours.      Assessment/Plan:      * ESRD (end stage renal disease)  Admitted to inpatient status  On admit had anasarca up to abdomen.    Previously had CKD4 with baseline Cr of 3 but was 5.8 on admit.    Noted nephrotic proteinuria  Continue strict ins/outs and daily weights  Continue diuresis with lasix  Continue dialysis per nephrology  Dr Menard on board and input appreciated  Outpatient HD has been arranged  Likely discharge tomorrow if remains afebrile and cultures remain negative.    Anasarca  Treatment as above    Nephrotic syndrome  Treatment as above    Fever  Febrile overnight 8/24-8/25.  Afebrile since 8/25 at 2:30AM  No obvious source of infection.  Unremarkable CXR.    UA without strong evidence of UTI and no dysuria or frequency.  PCT minimally elevated consistent with  ESRD  Blood cultures negative so far and remains afebrile.  Continue vancomycin for now    Hypocalcemia  Started on calcitrol 0.25 mcg and calcium acetate 667 po tid AC    Anemia  Anemia of CKD  H/H stable since admission with no evidence of bleeding.        Enlarged prostate  Continue home flomax - voiding well - strict I&Os      Hemiplegia and hemiparesis following cerebral infarction affecting right dominant side  Stable, no acute issue, continue plavix/statin.  PT/OT evaluation.  Anticipate discharge to SNF tomorrow if medically stable    Seizure disorder as sequela of cerebrovascular accident  Continue home regimen of phenytoin  No seizures.      Benign non-nodular prostatic hyperplasia without lower urinary tract symptoms  Continue home tamsulosin     Controlled type 2 diabetes mellitus with stage 4 chronic kidney disease, with long-term current use of insulin  HgbA1c 5.8  Hold oral antihyperglycemics while inpatient  PRN sliding scale insulin  ACHS glucose monitoring   ADA diet     Pure hypercholesterolemia  Continue statin    Benign essential HTN; ACEI hyperK  Initially uncontrolled now well managed  Norvasc stopped with lower extremity edema.  Continue hydrlazine and labetalol      VTE Risk Mitigation (From admission, onward)         Ordered     heparin (porcine) injection 5,000 Units  Every 8 hours      08/25/20 1628     heparin (porcine) injection 5,000 Units  As needed (PRN)      08/20/20 1251     IP VTE HIGH RISK PATIENT  Once      08/15/20 1505     Place sequential compression device  Until discontinued      08/15/20 1505                Discharge Planning   GARY:      Code Status: Full Code   Is the patient medically ready for discharge?:     Reason for patient still in hospital (select all that apply): Treatment  Discharge Plan A: Skilled Nursing Facility   Discharge Delays: None known at this time              Clement Wolfe MD  Department of Hospital Medicine   [facility]

## 2020-08-26 NOTE — ASSESSMENT & PLAN NOTE
Febrile overnight 8/24-8/25.  Afebrile since 8/25 at 2:30AM  No obvious source of infection.  Unremarkable CXR.    UA without strong evidence of UTI and no dysuria or frequency.  PCT minimally elevated consistent with ESRD  Blood cultures negative so far and remains afebrile.  Continue vancomycin for now

## 2020-08-26 NOTE — ASSESSMENT & PLAN NOTE
Stable, no acute issue, continue plavix/statin.  PT/OT evaluation.  Anticipate discharge to SNF tomorrow if medically stable

## 2020-08-26 NOTE — NURSING
Received bedside handoff from [facility]. Patient resting comfortably at present time. Discussed plan of care, pain management and safety measures with patient. Patient has external catheter on and urinedraining per urimeter.

## 2020-08-26 NOTE — PT/OT/SLP PROGRESS
Occupational Therapy   Treatment    Name: Miguel Moore  MRN: [id number]  Admitting Diagnosis:  Nephrotic syndrome  9 Days Post-Op    Recommendations:     Discharge Recommendations: nursing facility, skilled  Discharge Equipment Recommendations:  bath bench, bedside commode  Barriers to discharge:       Assessment:     Miguel Moore is a 65 y.o. male with a medical diagnosis of Nephrotic syndrome.  He presents with the following performance deficits affecting function: weakness, impaired endurance, impaired self care skills, impaired balance, gait instability, impaired functional mobilty, decreased upper extremity function, decreased lower extremity function, decreased ROM, edema, pain, decreased safety awareness, impaired fine motor, impaired coordination, impaired joint extensibility, abnormal tone.     Pt tolerated EOB self-care tasks today with SBA-Min A to maintain static/dynamic sitting balance . Pt still c/o R sided pain rated 8/10. Pt progressing and will benefit from continued OT services to address functional deficits, safety concerns, and maximize level of functional independence.    Rehab Prognosis:  Good; patient would benefit from acute skilled OT services to address these deficits and reach maximum level of function.       Plan:     Patient to be seen (5-6x/wk) to address the above listed problems via self-care/home management, therapeutic activities, therapeutic exercises, neuromuscular re-education  · Plan of Care Expires: 09/02/20  · Plan of Care Reviewed with: patient, significant other    Subjective   Pt agreeable to therapy with encouragement.     Pain/Comfort:  · Pain Rating 1: 8/10  · Location - Side 1: Right  · Location 1: other (see comments)  · Pain Addressed 1: Reposition, Distraction, Cessation of Activity, Pre-medicate for activity, Nurse notified    Objective:     Communicated with: Nurse prior to session.  Patient found supine with telemetry, peripheral IV upon OT entry to  room.    General Precautions: Standard, fall, diabetic, seizure   Orthopedic Precautions:N/A   Braces: N/A     Occupational Performance:     Bed Mobility:  Pt with increased pain to R side with mobility.  · Patient completed Scooting/Bridging with dependent and 2 persons to scoot to HOB, anteriorly to EOB, and laterally along EOB  · Patient completed Supine to Sit with dependent and 2 persons  · Patient completed Sit to Supine with dependent and 2 persons     Functional Mobility/Transfers: Not performed    Activities of Daily Living:  · Grooming: minimum assistance overall to perform oral care and to wash face while seated EOB using LUE. Min A to maintain dynamic sitting balance during task. Pt uses one handed techniques; occasionally using RUE as functional assist when able ( placed toothpaste in right hand to unscrew top), but ultimately required set-up assist prior to completing task. Pt also noted with decreased FMC and strength to L hand (difficulty grasping items requiring multiple attempts; both gross grasp and FM; grasping full cup of water and toothbrush )   · Upper Body Dressing: total assistance to daxa gown via sahara dressing technique ( pt with increased pain)      AMPAC 6 Click ADL: 13    Treatment & Education:   Pt re-educated on OT role/POC   Importance of EOB activity and mobilizing with therapy to maximize recovery   Pt tolerated sitting EOB ~ 20 mins with SBA to maintain static sitting balance and Min A to maintain dynamic sitting balance during self-care tasks.    Importance of performing UE/LE therex throughout day to increase strength and endurance   Safety and proper techniques with bed mobility and ADL tasks given R sided deficits   White board updated    Multiple self-care tasks/bed mobility completed- assistance level noted above    Pt verbalizes understanding of all education provided this date. All questions/concerns answered within OT scope of practice      Patient left HOB  elevated with BLE heels offloaded on pillow, all lines intact, call button in reach, bed alarm on, nurse notified and AVASYS presentEducation:      GOALS:   Multidisciplinary Problems     Occupational Therapy Goals        Problem: Occupational Therapy Goal    Goal Priority Disciplines Outcome Interventions   Occupational Therapy Goal     OT, PT/OT Ongoing, Progressing    Description: Goals to be met by: 09/02/20     Patient will increase functional independence with ADLs by performing:    Feeding with Modified [place].  UE Dressing with Modified [place].  LE Dressing with Minimal Assistance.  Grooming while seated with Modified [place].  Toileting from bedside commode with Stand-by Assistance for hygiene and clothing management.   Rolling to Bilateral with Supervision.   Supine to sit with Supervision.  Step transfer with Stand-by Assistance  Stand pivot transfer with Stand-by Assistance  Bed>wheelchair transfer with Stand-by assistance   Pt will manually propel w/c household distance with MOD I.   Toilet transfer to bedside commode with Stand-by Assistance.  Upper extremity exercise program x15 reps per handout, with independence.                     Time Tracking:     OT Date of Treatment: 08/26/20  OT Start Time: 1143  OT Stop Time: 1215  OT Total Time (min): 32 min    Billable Minutes:Self Care/Home Management 16 (co-tx with PT)    GENEVA Mckenzie  8/26/2020

## 2020-08-26 NOTE — PLAN OF CARE
Problem: Physical Therapy Goal  Goal: Physical Therapy Goal  Description: Goals to be met by:     Patient will increase functional independence with mobility by performin. Supine to sit with Stand-by Assistance  2. Sit to supine with Stand-by Assistance  3. Sit to stand transfer with Stand-by Assistance using Quad Cane.  4. Bed to chair transfer with Minimal Assistance using Quad Cane and stand pivot transfer.  5. Bed to Wheelchair transfer with Stand-by Assistance and squat pivot transfer.  6. Gait x 50 feet with Minimal Assistance using Quad Cane.   7. Stand for 20 minutes with Stand-by Assistance using Quad Cane  8. Lower extremity exercise program x20 reps per handout, with assistance as needed    Outcome: Ongoing, Progressing   Pt will benefit from further skilled therapy in order to return to PLOF.

## 2020-08-26 NOTE — PT/OT/SLP PROGRESS
Occupational Therapy      Patient Name:  Miguel Moore   MRN:  [id number]    Patient not seen today secondary to Nursing care. Will attempt to follow-up with patient prior to HD this afternoon if time allows.    GENEVA Mckenzie  8/26/2020

## 2020-08-26 NOTE — PLAN OF CARE
Problem: Occupational Therapy Goal  Goal: Occupational Therapy Goal  Description: Goals to be met by: 09/02/20     Patient will increase functional independence with ADLs by performing:    Feeding with Modified [place].  UE Dressing with Modified [place].  LE Dressing with Minimal Assistance.  Grooming while seated with Modified [place].  Toileting from bedside commode with Stand-by Assistance for hygiene and clothing management.   Rolling to Bilateral with Supervision.   Supine to sit with Supervision.  Step transfer with Stand-by Assistance  Stand pivot transfer with Stand-by Assistance  Bed>wheelchair transfer with Stand-by assistance   Pt will manually propel w/c household distance with MOD I.   Toilet transfer to bedside commode with Stand-by Assistance.  Upper extremity exercise program x15 reps per handout, with independence.    Outcome: Ongoing, Progressing  Pt tolerated EOB self-care tasks today with improved sitting balance. Pt still c/o R sided pain rated 8/10. Pt progressing and will benefit from continued OT services to address functional deficits. Continue OT POC as indicated.

## 2020-08-26 NOTE — UM SECONDARY REVIEW
[facility]    IP Extended Stay > 10     Likely dc to SNF tomorrow if afebrile and cx remain negative    {OHS  Review Outcome:[id number]}

## 2020-08-26 NOTE — ASSESSMENT & PLAN NOTE
Initially uncontrolled now well managed  Norvasc stopped with lower extremity edema.  Continue hydrlazine and labetalol

## 2020-08-26 NOTE — NURSING
1151 Dr. Negro, notified that patient was given the PRN diazepam for muscle spasms at 940 but his right side leg and arm is still hurting him, 8 out of 10 on the pain scale. Therapy tried to work with him but he asked them to come back later due to his pain. Also complaining about being constipated, Dr. Menard ordered 1 time dose of lactulose, that was given this AM also.      1410 Tylenol given for the pain as nothing else available on MAR.       1628 Dr. Negro notified that patients daughter is here, and she ask that I message MD again regarding his pain because she states that he has never had pain on his right side before since the stroke 5 years ago.  Dr. Negro ordered PRN Percocet, and another dose of Lactulose.       1800 Patient had BM before second dose of Lactulose given. Patient refused second dose when offered stating that he already had a BM. Patient states he got some relief from percocet, pain went from 8 down to 6. Patient made aware that he may call for pain medication every 4 hours if he needs it, verbalized understanding.

## 2020-08-26 NOTE — PROGRESS NOTES
Pharmacokinetic Assessment Follow Up: IV Vancomycin    Vancomycin serum concentration assessment(s):    The random level was drawn correctly and can be used to guide therapy at this time. The measurement is within the desired definitive target range of 10 to 20 mcg/mL.    Vancomycin Regimen Plan:    Re-dose when the random level is less than 20 mcg/mL, next level to be drawn at 0400 on 8/28/2020    Drug levels (last 3 results):  Recent Labs   Lab Result Units 08/26/20  0131   Vancomycin, Random ug/mL 17.7       Pharmacy will continue to follow and monitor vancomycin.    Please contact pharmacy at extension [phone] for questions regarding this assessment.    Thank you for the consult,   Hipolito Sandy       Patient brief summary:  Miguel Moore is a 65 y.o. male initiated on antimicrobial therapy with IV Vancomycin for treatment of bacteremia    The patient's current regimen is random based upon pre HD level to determine post HD dose.    Drug Allergies:   Review of patient's allergies indicates:   Allergen Reactions    Ace inhibitors      Hyperkalemia 8/2018    Penicillins Hives    Tizanidine      Felt hot       Actual Body Weight:   97.8 kg    Renal Function:   Estimated Creatinine Clearance: 12.9 mL/min (A) (based on SCr of 6.4 mg/dL (H)).,     Dialysis Method (if applicable):  intermittent HD    CBC (last 72 hours):  Recent Labs   Lab Result Units 08/24/20  0937   WBC K/uL 8.99   Hemoglobin g/dL 8.7*   Hematocrit % 26.5*   Platelets K/uL 160   Gran% % 87.4*   Lymph% % 3.3*   Mono% % 8.1   Eosinophil% % 0.4   Basophil% % 0.2   Differential Method  Automated       Metabolic Panel (last 72 hours):  Recent Labs   Lab Result Units 08/23/20  0512 08/24/20  0049 08/25/20  1650 08/26/20  0131   Sodium mmol/L 132* 133*  --  128*   Potassium mmol/L 4.7 4.8  --  4.5   Chloride mmol/L 100 100  --  95   CO2 mmol/L 21* 20*  --  24   Glucose mg/dL 161* 153*  --  174*   Glucose, UA   --   --  1+*  --    BUN, Bld mg/dL 42*  50*  --  57*   Creatinine mg/dL 4.8* 5.7*  --  6.4*   Albumin g/dL 2.3* 2.3*  --  2.0*   Total Bilirubin mg/dL 0.2 0.2  --  0.2   Alkaline Phosphatase U/L 92 88  --  74   AST U/L 16 15  --  20   ALT U/L 22 17  --  22       Vancomycin Administrations:  vancomycin given in the last 96 hours                     vancomycin (VANCOCIN) 2,000 mg in dextrose 5 % 500 mL IVPB (mg) 2,000 mg New Bag 08/25/20 0255                    Microbiologic Results:  Microbiology Results (last 7 days)       Procedure Component Value Units Date/Time    Blood culture [[id number]] Collected: 08/25/20 0200    Order Status: Completed Specimen: Blood from Peripheral, Right Wrist Updated: 08/25/20 1112     Blood Culture, Routine No Growth to date

## 2020-08-26 NOTE — ASSESSMENT & PLAN NOTE
HgbA1c 5.8  Hold oral antihyperglycemics while inpatient  PRN sliding scale insulin  ACHS glucose monitoring   ADA diet

## 2020-08-26 NOTE — PLAN OF CARE
Important Message from Medicare and discharge appeal process reviewed with patient's daughter; copy emailed to [email or web address]       08/26/20 1630   Medicare Message   Date IMM was signed 08/26/20   Time IMM was signed 1621

## 2020-08-27 PROBLEM — B49 FUNGEMIA: Status: RESOLVED | Noted: 2020-08-27 | Resolved: 2020-08-27

## 2020-08-27 PROBLEM — B37.7 CANDIDEMIA: Status: RESOLVED | Noted: 2020-08-27 | Resolved: 2020-08-27

## 2020-08-27 PROBLEM — B37.7 CANDIDEMIA: Status: ACTIVE | Noted: 2020-08-27

## 2020-08-27 PROBLEM — B49 FUNGEMIA: Status: ACTIVE | Noted: 2020-08-25

## 2020-08-27 PROBLEM — B49 FUNGEMIA: Status: ACTIVE | Noted: 2020-08-27

## 2020-08-27 LAB
ALBUMIN SERPL BCP-MCNC: 1.8 G/DL (ref 3.5–5.2)
ALP SERPL-CCNC: 73 U/L (ref 55–135)
ALT SERPL W/O P-5'-P-CCNC: 17 U/L (ref 10–44)
ANION GAP SERPL CALC-SCNC: 6 MMOL/L (ref 8–16)
AST SERPL-CCNC: 12 U/L (ref 10–40)
BASOPHILS # BLD AUTO: 0.03 K/UL (ref 0–0.2)
BASOPHILS NFR BLD: 0.6 % (ref 0–1.9)
BILIRUB SERPL-MCNC: 0.1 MG/DL (ref 0.1–1)
BUN SERPL-MCNC: 37 MG/DL (ref 8–23)
CALCIUM SERPL-MCNC: 6.7 MG/DL (ref 8.7–10.5)
CHLORIDE SERPL-SCNC: 102 MMOL/L (ref 95–110)
CO2 SERPL-SCNC: 26 MMOL/L (ref 23–29)
CREAT SERPL-MCNC: 4.4 MG/DL (ref 0.5–1.4)
DIFFERENTIAL METHOD: ABNORMAL
EOSINOPHIL # BLD AUTO: 0.1 K/UL (ref 0–0.5)
EOSINOPHIL NFR BLD: 2.5 % (ref 0–8)
ERYTHROCYTE [DISTWIDTH] IN BLOOD BY AUTOMATED COUNT: 13 % (ref 11.5–14.5)
EST. GFR  (AFRICAN AMERICAN): 15 ML/MIN/1.73 M^2
EST. GFR  (NON AFRICAN AMERICAN): 13 ML/MIN/1.73 M^2
FERRITIN SERPL-MCNC: 419 NG/ML (ref 20–300)
FOLATE SERPL-MCNC: 3.7 NG/ML (ref 4–24)
GLUCOSE SERPL-MCNC: 168 MG/DL (ref 70–110)
HCT VFR BLD AUTO: 21.8 % (ref 40–54)
HGB BLD-MCNC: 7.3 G/DL (ref 14–18)
IMM GRANULOCYTES # BLD AUTO: 0.02 K/UL (ref 0–0.04)
IMM GRANULOCYTES NFR BLD AUTO: 0.4 % (ref 0–0.5)
IRON SERPL-MCNC: 36 UG/DL (ref 45–160)
LYMPHOCYTES # BLD AUTO: 0.8 K/UL (ref 1–4.8)
LYMPHOCYTES NFR BLD: 16.3 % (ref 18–48)
MAGNESIUM SERPL-MCNC: 2 MG/DL (ref 1.6–2.6)
MCH RBC QN AUTO: 25.6 PG (ref 27–31)
MCHC RBC AUTO-ENTMCNC: 33.5 G/DL (ref 32–36)
MCV RBC AUTO: 77 FL (ref 82–98)
MONOCYTES # BLD AUTO: 0.9 K/UL (ref 0.3–1)
MONOCYTES NFR BLD: 18 % (ref 4–15)
NEUTROPHILS # BLD AUTO: 3 K/UL (ref 1.8–7.7)
NEUTROPHILS NFR BLD: 62.2 % (ref 38–73)
NRBC BLD-RTO: 0 /100 WBC
PHOSPHATE SERPL-MCNC: 3.4 MG/DL (ref 2.7–4.5)
PLATELET # BLD AUTO: 129 K/UL (ref 150–350)
PMV BLD AUTO: 9.9 FL (ref 9.2–12.9)
POCT GLUCOSE: 155 MG/DL (ref 70–110)
POCT GLUCOSE: 167 MG/DL (ref 70–110)
POCT GLUCOSE: 173 MG/DL (ref 70–110)
POCT GLUCOSE: 192 MG/DL (ref 70–110)
POTASSIUM SERPL-SCNC: 4 MMOL/L (ref 3.5–5.1)
PROT SERPL-MCNC: 4.8 G/DL (ref 6–8.4)
RBC # BLD AUTO: 2.85 M/UL (ref 4.6–6.2)
SATURATED IRON: 24 % (ref 20–50)
SODIUM SERPL-SCNC: 134 MMOL/L (ref 136–145)
TOTAL IRON BINDING CAPACITY: 149 UG/DL (ref 250–450)
TRANSFERRIN SERPL-MCNC: 101 MG/DL (ref 200–375)
VIT B12 SERPL-MCNC: 896 PG/ML (ref 210–950)
WBC # BLD AUTO: 4.79 K/UL (ref 3.9–12.7)

## 2020-08-27 PROCEDURE — 25000003 PHARM REV CODE 250: Performed by: INTERNAL MEDICINE

## 2020-08-27 PROCEDURE — 82728 ASSAY OF FERRITIN: CPT

## 2020-08-27 PROCEDURE — 99223 1ST HOSP IP/OBS HIGH 75: CPT | Mod: ,,, | Performed by: INTERNAL MEDICINE

## 2020-08-27 PROCEDURE — 82746 ASSAY OF FOLIC ACID SERUM: CPT

## 2020-08-27 PROCEDURE — 36415 COLL VENOUS BLD VENIPUNCTURE: CPT

## 2020-08-27 PROCEDURE — 82607 VITAMIN B-12: CPT

## 2020-08-27 PROCEDURE — 63600175 PHARM REV CODE 636 W HCPCS: Performed by: INTERNAL MEDICINE

## 2020-08-27 PROCEDURE — 80053 COMPREHEN METABOLIC PANEL: CPT

## 2020-08-27 PROCEDURE — 84100 ASSAY OF PHOSPHORUS: CPT

## 2020-08-27 PROCEDURE — 25000003 PHARM REV CODE 250: Performed by: HOSPITALIST

## 2020-08-27 PROCEDURE — 99223 PR INITIAL HOSPITAL CARE,LEVL III: ICD-10-PCS | Mod: ,,, | Performed by: INTERNAL MEDICINE

## 2020-08-27 PROCEDURE — 83540 ASSAY OF IRON: CPT

## 2020-08-27 PROCEDURE — 85025 COMPLETE CBC W/AUTO DIFF WBC: CPT

## 2020-08-27 PROCEDURE — 63600175 PHARM REV CODE 636 W HCPCS: Performed by: HOSPITALIST

## 2020-08-27 PROCEDURE — 21400001 HC TELEMETRY ROOM

## 2020-08-27 PROCEDURE — 94761 N-INVAS EAR/PLS OXIMETRY MLT: CPT

## 2020-08-27 PROCEDURE — 83735 ASSAY OF MAGNESIUM: CPT

## 2020-08-27 RX ADMIN — TAMSULOSIN HYDROCHLORIDE 0.8 MG: 0.4 CAPSULE ORAL at 08:08

## 2020-08-27 RX ADMIN — INSULIN ASPART 1 UNITS: 100 INJECTION, SOLUTION INTRAVENOUS; SUBCUTANEOUS at 09:08

## 2020-08-27 RX ADMIN — CLOPIDOGREL 75 MG: 75 TABLET, FILM COATED ORAL at 09:08

## 2020-08-27 RX ADMIN — MAGNESIUM OXIDE TAB 400 MG (241.3 MG ELEMENTAL MG) 400 MG: 400 (241.3 MG) TAB at 09:08

## 2020-08-27 RX ADMIN — ATORVASTATIN CALCIUM 80 MG: 40 TABLET, FILM COATED ORAL at 08:08

## 2020-08-27 RX ADMIN — FUROSEMIDE 80 MG: 40 TABLET ORAL at 08:08

## 2020-08-27 RX ADMIN — CALCIUM ACETATE 667 MG: 667 CAPSULE ORAL at 05:08

## 2020-08-27 RX ADMIN — EPOETIN ALFA-EPBX 10000 UNITS: 10000 INJECTION, SOLUTION INTRAVENOUS; SUBCUTANEOUS at 08:08

## 2020-08-27 RX ADMIN — CALCITRIOL CAPSULES 0.25 MCG 0.25 MCG: 0.25 CAPSULE ORAL at 09:08

## 2020-08-27 RX ADMIN — CALCIUM ACETATE 667 MG: 667 CAPSULE ORAL at 12:08

## 2020-08-27 RX ADMIN — HYDRALAZINE HYDROCHLORIDE 25 MG: 25 TABLET, FILM COATED ORAL at 09:08

## 2020-08-27 RX ADMIN — TRAZODONE HYDROCHLORIDE 50 MG: 50 TABLET ORAL at 09:08

## 2020-08-27 RX ADMIN — HEPARIN SODIUM 5000 UNITS: 5000 INJECTION INTRAVENOUS; SUBCUTANEOUS at 05:08

## 2020-08-27 RX ADMIN — HEPARIN SODIUM 5000 UNITS: 5000 INJECTION INTRAVENOUS; SUBCUTANEOUS at 02:08

## 2020-08-27 RX ADMIN — CALCIUM ACETATE 667 MG: 667 CAPSULE ORAL at 08:08

## 2020-08-27 RX ADMIN — PHENYTOIN SODIUM 100 MG: 100 CAPSULE ORAL at 02:08

## 2020-08-27 RX ADMIN — INSULIN ASPART 2 UNITS: 100 INJECTION, SOLUTION INTRAVENOUS; SUBCUTANEOUS at 04:08

## 2020-08-27 RX ADMIN — PHENYTOIN SODIUM 100 MG: 100 CAPSULE ORAL at 09:08

## 2020-08-27 RX ADMIN — MICAFUNGIN SODIUM 100 MG: 20 INJECTION, POWDER, LYOPHILIZED, FOR SOLUTION INTRAVENOUS at 10:08

## 2020-08-27 RX ADMIN — LABETALOL HYDROCHLORIDE 100 MG: 100 TABLET, FILM COATED ORAL at 09:08

## 2020-08-27 RX ADMIN — INSULIN ASPART 2 UNITS: 100 INJECTION, SOLUTION INTRAVENOUS; SUBCUTANEOUS at 12:08

## 2020-08-27 RX ADMIN — INSULIN ASPART 2 UNITS: 100 INJECTION, SOLUTION INTRAVENOUS; SUBCUTANEOUS at 08:08

## 2020-08-27 NOTE — PLAN OF CARE
Problem: Fall Injury Risk  Goal: Absence of Fall and Fall-Related Injury  Outcome: Ongoing, Progressing  Intervention: Identify and Manage Contributors to Fall Injury Risk  Flowsheets (Taken 8/27/2020 0440)  Self-Care Promotion:   independence encouraged   BADL personal objects within reach  Medication Review/Management: medications reviewed  Intervention: Promote Injury-Free Environment  Flowsheets (Taken 8/27/2020 0440)  Safety Promotion/Fall Prevention:   assistive device/personal item within reach   bed alarm set   instructed to call staff for mobility   medications reviewed  Environmental Safety Modification:   assistive device/personal items within reach   clutter free environment maintained   room near unit station     Problem: Adult Inpatient Plan of Care  Goal: Plan of Care Review  Outcome: Ongoing, Progressing     Problem: Skin Injury Risk Increased  Goal: Skin Health and Integrity  Outcome: Ongoing, Progressing  Intervention: Optimize Skin Protection  Flowsheets (Taken 8/27/2020 0440)  Pressure Reduction Techniques:   frequent weight shift encouraged   heels elevated off bed   positioned off wounds   weight shift assistance provided  Pressure Reduction Devices: heel offloading device utilized  Skin Protection:   adhesive use limited   incontinence pads utilized  Head of Bed (HOB): HOB elevated     Problem: Infection  Goal: Infection Symptom Resolution  Outcome: Ongoing, Progressing  Intervention: Prevent or Manage Infection  Flowsheets (Taken 8/27/2020 0440)  Fever Reduction/Comfort Measures: medication administered  Infection Management: aseptic technique maintained  Isolation Precautions: protective environment maintained

## 2020-08-27 NOTE — PROGRESS NOTES
[facility] Hospital Medicine  Progress Note    Patient Name: Miguel Moore  MRN: [id number]  Patient Class: IP- Inpatient   Admission Date: 8/15/2020  Length of Stay: 11 days  Attending Physician: Clement Wolfe MD  Primary Care Provider: Raciel Raymond MD        Subjective:     Principal Problem:ESRD (end stage renal disease)        HPI:  65 y.o. 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  States he was called on 8/13/2020, but did not come to the ED until today.  He reports mild edema and weight gain of 1-2 lb.  Denies fever, chills, cough, SOB, chest pain, palpitations, orthopnea, PND, dizziness, syncope, n/v/d, abd pain, or dysuria.  In the ED, Cr/BUN 5.3/64, K+ 5.4, H/H 7.5/22.7, CPK 1262, , UA shows proteinura 3+, renal US suggest chronic medical renal disease.  His Nephrologist is Dr. Roa, last visit Oct 2019.  Nephrology consult.  Placed in observation.    Overview/Hospital Course:  65 y.o. AAM with history significant for DMII, HTN, stroke, and CKD4 presents for evaluation of worsening CKD sent in by provider. Creatine = 2.0 1 year ago now with creatine 5.4 and potassium 5.8 worsening swelling and anasarca 2-3+ edema up to abdomen and flank, activity intolerance and orthopnea X 5 pillows. He is not on hemodialysis.  Amlodipine stopped.  Worsening BP and started on Hydralazine.  Nephrology consulted.  BP improved, but worsening Creat with persistent hyperkalemia.  Patient will need HD.  IR consulted for HD catheter.  Temporary HD catheter placed on 8/20 and patient underwent HD.  Catheter switched to tunneled catheter on 8/21.  Patient with right sided hemiplegia from previous stroke.  Deconditioned and PT/OT consulted.  Initial recommendation for SNF.  Patient will need outpatient HD  arrangements and SW consulted.  Patient started spiking fevers overnight 8/25.  Unremarkable CXR.  BCx and UA obtained.    Interval History: No acute events overnight.  In better spirits.  Still profoundly weak and with significant edema up to his thighs.  Remains afebrile.  Denies cp and sob.    Review of Systems   Constitutional: Positive for activity change. Negative for appetite change and fever.   HENT: Negative for ear discharge and ear pain.    Eyes: Negative for discharge and itching.   Respiratory: Negative for cough and chest tightness.    Cardiovascular: Positive for leg swelling. Negative for chest pain.   Gastrointestinal: Negative for abdominal distention and abdominal pain.   Endocrine: Negative for cold intolerance and heat intolerance.   Genitourinary: Negative for difficulty urinating and dysuria.   Musculoskeletal: Negative for arthralgias and back pain.   Neurological: Positive for weakness. Negative for seizures and syncope.   Psychiatric/Behavioral: Negative for agitation, confusion and dysphoric mood.     Objective:     Vital Signs (Most Recent):  Temp: 98.6 °F (37 °C) (08/27/20 0522)  Pulse: 90 (08/27/20 0522)  Resp: 18 (08/27/20 0522)  BP: (!) 114/54 (08/27/20 0522)  SpO2: 96 % (08/27/20 0522) Vital Signs (24h Range):  Temp:  [98 °F (36.7 °C)-98.9 °F (37.2 °C)] 98.6 °F (37 °C)  Pulse:  [78-99] 90  Resp:  [16-18] 18  SpO2:  [96 %-98 %] 96 %  BP: ()/(48-73) 114/54     Weight: 97.9 kg (215 lb 13.3 oz)  Body mass index is 32.82 kg/m².    Intake/Output Summary (Last 24 hours) at 8/27/2020 0633  Last data filed at 8/27/2020 0600  Gross per 24 hour   Intake 650 ml   Output 1700 ml   Net -1050 ml      Physical Exam  Vitals signs and nursing note reviewed.   Constitutional:       General: He is not in acute distress.     Appearance: He is well-developed.   HENT:      Head: Normocephalic and atraumatic.      Right Ear: External ear normal.      Left Ear: External ear normal.      Nose: Nose  normal.   Eyes:      Conjunctiva/sclera: Conjunctivae normal.      Pupils: Pupils are equal, round, and reactive to light.   Neck:      Musculoskeletal: Normal range of motion and neck supple.   Cardiovascular:      Rate and Rhythm: Normal rate and regular rhythm.   Pulmonary:      Effort: Pulmonary effort is normal. No respiratory distress.      Breath sounds: No wheezing.   Abdominal:      General: Bowel sounds are normal. There is no distension.      Palpations: Abdomen is soft.      Tenderness: There is no abdominal tenderness.      Comments: No palpable hepatomegaly or splenomegaly   Musculoskeletal:         General: Swelling present. No tenderness.      Comments: Deep edema up to both thighs   Skin:     General: Skin is warm and dry.   Neurological:      Mental Status: He is alert and oriented to person, place, and time.      Comments: Right sided hemiplegia   Psychiatric:         Thought Content: Thought content normal.         Significant Labs: All pertinent labs within the past 24 hours have been reviewed.    Significant Imaging: I have reviewed and interpreted all pertinent imaging results/findings within the past 24 hours.      Assessment/Plan:      * ESRD (end stage renal disease)  Admitted to inpatient status  On admit had anasarca up to abdomen.    Previously had CKD4 with baseline Cr of 3 but was 5.8 on admit.    Noted nephrotic proteinuria  Continue strict ins/outs and daily weights  Continue diuresis with lasix  Continue dialysis per nephrology  Dr Menard on board and input appreciated  Outpatient HD has been arranged  Had planned discharge today if remained afebrile and cultures negative.  While he is afebrile, his blood culture gram stain showed budding yeast.   Dr. Gmoez consulted and case discussed with him.  Have started micafungin and will remove dialysis line after HD tomorrow.  Plan line holiday over weekend and new line on Monday.  Will discontinue vancomycin.    Anasarca  Treatment as  above    Nephrotic syndrome  Treatment as above    Fungemia  Febrile overnight 8/24-8/25.  Was started on vanco and has been afebrile since 8/25 at 2:30AM  No obvious source of infection.  Unremarkable CXR.    UA without strong evidence of UTI and no dysuria or frequency.  PCT minimally elevated consistent with ESRD  Blood cultures now showing budding yeast on gram stain, but have no growth so far.  Will discontinue vancomycin   Consulted ID.  Micafungin started.  Remove dialysis line tomorrow after HD and plan line holiday.  New Line Monday.  Repeat cultures today.    Hypocalcemia  Corrected calcium is 8.9  Continue calcitrol 0.25 mcg and calcium acetate 667 po tid AC    Anemia  Anemia of CKD  No evidence of bleeding.  Hb has dropped today but remains above transfusion threshold  Check iron, ferritin, b12 and folate.  Repeat cbc in AM        Enlarged prostate  Continue home flomax - voiding well - strict I&Os      Hemiplegia and hemiparesis following cerebral infarction affecting right dominant side  Stable, no acute issue, continue plavix/statin.  PT/OT evaluation.  Anticipate discharge to SNF when medically stable    Seizure disorder as sequela of cerebrovascular accident  Continue home regimen of phenytoin  No seizures.      Benign non-nodular prostatic hyperplasia without lower urinary tract symptoms  Continue home tamsulosin     Controlled type 2 diabetes mellitus with stage 4 chronic kidney disease, with long-term current use of insulin  HgbA1c 5.8  Hold oral antihyperglycemics while inpatient  PRN sliding scale insulin  ACHS glucose monitoring   ADA diet     Pure hypercholesterolemia  Continue statin    Benign essential HTN; ACEI hyperK  Initially uncontrolled now well managed  Norvasc stopped due to lower extremity edema.  Continue hydrlazine and labetalol    VTE Risk Mitigation (From admission, onward)         Ordered     IP VTE HIGH RISK PATIENT  Once      08/15/20 1505     Place sequential compression  device  Until discontinued      08/15/20 1505                Discharge Planning   GARY:      Code Status: Full Code   Is the patient medically ready for discharge?:     Reason for patient still in hospital (select all that apply): Patient new problem  Discharge Plan A: Skilled Nursing Facility   Discharge Delays: None known at this time              Clement Wolfe MD  Department of Hospital Medicine   [facility]

## 2020-08-27 NOTE — ASSESSMENT & PLAN NOTE
Anemia of CKD  No evidence of bleeding.  Hb has dropped today but remains above transfusion threshold  Check iron, ferritin, b12 and folate.  Repeat cbc in AM

## 2020-08-27 NOTE — PT/OT/SLP PROGRESS
Physical Therapy      Patient Name:  Miguel Moore   MRN:  [id number]    Patient not seen today secondary to Patient unwilling to participate. On first attempt pt refused 2/2 to IV running at time and would be finished an 1 hour. 2nd attempt pt was soiled and awaiting nsg to clean. Will follow-up able.    Julián Milton, PTA

## 2020-08-27 NOTE — PT/OT/SLP PROGRESS
"Occupational Therapy      Patient Name:  Miguel Moore   MRN:  [id number]    Patient not seen today secondary to Patient unwilling to participate. Pt with no complaints other than he did not want get up with his IV running. Pt reports "maybe this evening".  Will follow-up as able.    2nd attempt 13:49 Pt not seen secondary to ( pt requiring nursing care/hygiene). Will follow-up as able.    GENEVA Mckenzie  8/27/2020  "

## 2020-08-27 NOTE — SUBJECTIVE & OBJECTIVE
Interval History: No acute events overnight.  In better spirits.  Still profoundly weak and with significant edema up to his thighs.  Remains afebrile.  Denies cp and sob.    Review of Systems   Constitutional: Positive for activity change. Negative for appetite change and fever.   HENT: Negative for ear discharge and ear pain.    Eyes: Negative for discharge and itching.   Respiratory: Negative for cough and chest tightness.    Cardiovascular: Positive for leg swelling. Negative for chest pain.   Gastrointestinal: Negative for abdominal distention and abdominal pain.   Endocrine: Negative for cold intolerance and heat intolerance.   Genitourinary: Negative for difficulty urinating and dysuria.   Musculoskeletal: Negative for arthralgias and back pain.   Neurological: Positive for weakness. Negative for seizures and syncope.   Psychiatric/Behavioral: Negative for agitation, confusion and dysphoric mood.     Objective:     Vital Signs (Most Recent):  Temp: 98.6 °F (37 °C) (08/27/20 0522)  Pulse: 90 (08/27/20 0522)  Resp: 18 (08/27/20 0522)  BP: (!) 114/54 (08/27/20 0522)  SpO2: 96 % (08/27/20 0522) Vital Signs (24h Range):  Temp:  [98 °F (36.7 °C)-98.9 °F (37.2 °C)] 98.6 °F (37 °C)  Pulse:  [78-99] 90  Resp:  [16-18] 18  SpO2:  [96 %-98 %] 96 %  BP: ()/(48-73) 114/54     Weight: 97.9 kg (215 lb 13.3 oz)  Body mass index is 32.82 kg/m².    Intake/Output Summary (Last 24 hours) at 8/27/2020 0633  Last data filed at 8/27/2020 0600  Gross per 24 hour   Intake 650 ml   Output 1700 ml   Net -1050 ml      Physical Exam  Vitals signs and nursing note reviewed.   Constitutional:       General: He is not in acute distress.     Appearance: He is well-developed.   HENT:      Head: Normocephalic and atraumatic.      Right Ear: External ear normal.      Left Ear: External ear normal.      Nose: Nose normal.   Eyes:      Conjunctiva/sclera: Conjunctivae normal.      Pupils: Pupils are equal, round, and reactive to light.    Neck:      Musculoskeletal: Normal range of motion and neck supple.   Cardiovascular:      Rate and Rhythm: Normal rate and regular rhythm.   Pulmonary:      Effort: Pulmonary effort is normal. No respiratory distress.      Breath sounds: No wheezing.   Abdominal:      General: Bowel sounds are normal. There is no distension.      Palpations: Abdomen is soft.      Tenderness: There is no abdominal tenderness.      Comments: No palpable hepatomegaly or splenomegaly   Musculoskeletal:         General: Swelling present. No tenderness.      Comments: Deep edema up to both thighs   Skin:     General: Skin is warm and dry.   Neurological:      Mental Status: He is alert and oriented to person, place, and time.      Comments: Right sided hemiplegia   Psychiatric:         Thought Content: Thought content normal.         Significant Labs: All pertinent labs within the past 24 hours have been reviewed.    Significant Imaging: I have reviewed and interpreted all pertinent imaging results/findings within the past 24 hours.

## 2020-08-27 NOTE — NURSING
Report Recieved from off going nurse Giana MAIER. Patient is AAO, on Room air, no distress noted. Bed in lowest position, wheels locked, call light in reach.

## 2020-08-27 NOTE — CONSULTS
[facility]  Infectious Disease  Consult Note    Patient Name: Miguel Moore  MRN: [id number]  Admission Date: 8/15/2020  Hospital Length of Stay: 11 days  Attending Physician: Clement Wolfe MD  Primary Care Provider: Raciel Raymond MD     Isolation Status: No active isolations    Patient information was obtained from patient, past medical records and ER records.      Inpatient consult to Infectious Diseases  Consult performed by: Emil Ramirez MD  Consult ordered by: Clement Wolfe MD        Assessment/Plan:     * Candidemia  - blood culture obtained for fever  - d/w Micro: yeast  - continue micafungin  - d/w  Le: line holiday after HD tomorrow  - recommend Ophth consult per IDSA recommendations  - repeat blood cultures today      Thank you for your consult. I will follow-up with patient. Please contact us if you have any additional questions.    Emil Ramirez MD  Infectious Disease  [facility]    Subjective:     Principal Problem: Candidemia    HPI: 65 y.o. AAM with history significant for DMII, HTN, stroke, and CKD4 presents for evaluation of worsening CKD sent in by provider. Creatine = 2.0 1 year ago now with creatine 5.4 and potassium 5.8 worsening swelling and anasarca 2-3+ edema up to abdomen and flank, activity intolerance and orthopnea X 5 pillows. He is not on hemodialysis.  Amlodipine stopped.  Worsening BP and started on Hydralazine.  Nephrology consulted.  BP improved, but worsening Creat with persistent hyperkalemia.  Patient will need HD.  IR consulted for HD catheter.  Temporary HD catheter placed on 8/20 and patient underwent HD.  Catheter switched to tunneled catheter on 8/21.  Patient with right sided hemiplegia from previous stroke.  Deconditioned and PT/OT consulted.  Initial recommendation for SNF.  Patient will need outpatient HD arrangements and SW consulted.  Patient started spiking fevers overnight 8/25.  Unremarkable CXR.  BCx and  "UA obtained. Blood culture from the 25th is positive and the Gram stain demonstrates "budding yeasts." ID is consulted for that. Micafungin started. Patient feels well. No complaints. No visual changes.       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 Tay    CATARACT EXTRACTION W/  INTRAOCULAR LENS IMPLANT Left 10/19/2017    Dr. Tay    ESOPHAGOGASTRODUODENOSCOPY N/A 8/17/2020    Procedure: EGD (ESOPHAGOGASTRODUODENOSCOPY);  Surgeon: Desmond Chapman MD;  Location: [facility];  Service: Endoscopy;  Laterality: N/A;    FEMORAL ARTERY STENT      KNEE SURGERY      bilateral scope       Review of patient's allergies indicates:   Allergen Reactions    Ace inhibitors      Hyperkalemia 8/2018    Penicillins Hives    Tizanidine      Felt hot       Medications:  Medications Prior to Admission   Medication Sig    amLODIPine (NORVASC) 10 MG tablet Take 1 tablet (10 mg total) by mouth once daily.    atorvastatin (LIPITOR) 80 MG tablet Take 1 tablet (80 mg total) by mouth once daily.    baclofen (LIORESAL) 10 MG tablet Take 1 tablet (10 mg total) by mouth 4 (four) times daily.    cholecalciferol, vitamin D3, (VITAMIN D3) 1,000 unit capsule Take 1 capsule (1,000 Units total) by mouth once daily.    clopidogreL (PLAVIX) 75 mg tablet Take 1 tablet (75 mg total) by mouth once daily.    furosemide (LASIX) 20 MG tablet Take 1 tablet (20 mg total) by mouth once daily.    insulin detemir U-100 (LEVEMIR FLEXTOUCH U-100 INSULN) 100 unit/mL (3 mL) InPn pen Inject 16 Units into the skin every evening. STOP NOVOLOG    tamsulosin (FLOMAX) 0.4 mg Cap Take 2 capsules (0.8 mg total) by mouth once daily.    blood-glucose meter Misc 1 each by Misc.(Non-Drug; " "Combo Route) route once daily. Pharmacy-whichever brand specified by insurance    furosemide (LASIX) 20 MG tablet Take two tablets twice a day    lancets (ONETOUCH DELICA LANCETS) 33 gauge Misc 1 lancet by Misc.(Non-Drug; Combo Route) route once daily. ONCE DAILY BLOOD SUGAR TESTING; WHICHEVER BRAND COVERED BY INSURANCE    pen needle, diabetic 31 gauge x 1/4" Ndle For mealtime insulin (Patient not taking: Reported on 8/13/2020)    phenytoin (DILANTIN) 100 MG ER capsule Take 1 capsule (100 mg total) by mouth 3 (three) times daily.     Antibiotics (From admission, onward)    None        Antifungals (From admission, onward)    Start     Stop Route Frequency Ordered    08/27/20 1045  micafungin 100 mg in sodium chloride 0.9 % 100 mL IVPB (ready to mix system)      -- IV Every 24 hours (non-standard times) 08/27/20 0938        Antivirals (From admission, onward)    None           Immunization History   Administered Date(s) Administered    Influenza - High Dose - PF (65 years and older) 01/17/2020    Influenza - Quadrivalent - PF *Preferred* (6 months and older) 01/04/2019    PPD Test 08/24/2020    Pneumococcal Conjugate - 13 Valent 09/09/2019    Pneumococcal Polysaccharide - 23 Valent 05/08/2017    Tdap 05/08/2017    Zoster Recombinant 07/31/2020       Family History     Problem Relation (Age of Onset)    Cataracts Mother    Diabetes Mother    Heart disease Mother    Hypertension Mother, Sister    No Known Problems Father, Brother, Daughter, Maternal Aunt, Maternal Uncle, Paternal Aunt, Paternal Uncle, Maternal Grandmother, Maternal Grandfather, Paternal Grandmother, Paternal Grandfather        Social History     Socioeconomic History    Marital status: Single     Spouse name: Not on file    Number of children: Not on file    Years of education: Not on file    Highest education level: Not on file   Occupational History    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, etc   Social Needs    " Financial resource strain: Not on file    Food insecurity     Worry: Not on file     Inability: Not on file    Transportation needs     Medical: Not on file     Non-medical: Not on file   Tobacco Use    Smoking status: Never Smoker    Smokeless tobacco: Never Used   Substance and Sexual Activity    Alcohol use: No    Drug use: No    Sexual activity: Not on file   Lifestyle    Physical activity     Days per week: Not on file     Minutes per session: Not on file    Stress: Not on file   Relationships    Social connections     Talks on phone: Not on file     Gets together: Not on file     Attends Voodoo service: Not on file     Active member of club or organization: Not on file     Attends meetings of clubs or organizations: Not on file     Relationship status: Not on file   Other Topics Concern    Not on file   Social History Narrative    Not on file     Review of Systems   Constitutional: Negative for chills and fever.   Neurological: Positive for facial asymmetry and speech difficulty.   All other systems reviewed and are negative.    Objective:     Vital Signs (Most Recent):  Temp: 99 °F (37.2 °C) (08/27/20 1140)  Pulse: 80 (08/27/20 1140)  Resp: 18 (08/27/20 1140)  BP: (!) 107/53 (08/27/20 1140)  SpO2: 97 % (08/27/20 1140) Vital Signs (24h Range):  Temp:  [98 °F (36.7 °C)-99.2 °F (37.3 °C)] 99 °F (37.2 °C)  Pulse:  [78-99] 80  Resp:  [16-18] 18  SpO2:  [96 %-98 %] 97 %  BP: ()/(48-73) 107/53     Weight: 97.9 kg (215 lb 13.3 oz)  Body mass index is 32.82 kg/m².    Estimated Creatinine Clearance: 18.7 mL/min (A) (based on SCr of 4.4 mg/dL (H)).    Physical Exam  Vitals signs and nursing note reviewed.   Constitutional:       General: He is not in acute distress.     Appearance: Normal appearance. He is normal weight. He is not ill-appearing, toxic-appearing or diaphoretic.   HENT:      Head: Normocephalic and atraumatic.      Right Ear: External ear normal.      Left Ear: External ear normal.       Mouth/Throat:      Mouth: Mucous membranes are moist.   Eyes:      Extraocular Movements: Extraocular movements intact.      Conjunctiva/sclera: Conjunctivae normal.      Pupils: Pupils are equal, round, and reactive to light.   Neck:      Musculoskeletal: Normal range of motion and neck supple.   Cardiovascular:      Rate and Rhythm: Normal rate and regular rhythm.   Pulmonary:      Effort: Pulmonary effort is normal.      Breath sounds: Normal breath sounds.   Abdominal:      General: Abdomen is flat. Bowel sounds are normal.      Palpations: Abdomen is soft.   Musculoskeletal: Normal range of motion.         General: No swelling or tenderness.   Skin:     General: Skin is warm and dry.      Coloration: Skin is not jaundiced.   Neurological:      General: No focal deficit present.      Mental Status: He is alert and oriented to person, place, and time.   Psychiatric:         Mood and Affect: Mood normal.         Behavior: Behavior normal.         Thought Content: Thought content normal.         Judgment: Judgment normal.         Significant Labs:   Blood Culture:   Recent Labs   Lab 08/25/20  0200   LABBLOO Gram stain kelsey bottle: budding yeast   08/26/2020  21:10    Results called to and read back by: JB BEAUCHAMP/JULIO     CBC:   Recent Labs   Lab 08/26/20  0131 08/26/20  0849 08/27/20  0409   WBC 4.41 5.31 4.79   HGB 7.4* 8.1* 7.3*   HCT 22.2* 24.5* 21.8*   * 155 129*     CMP:   Recent Labs   Lab 08/26/20  0131 08/27/20  0409   * 134*   K 4.5 4.0   CL 95 102   CO2 24 26   * 168*   BUN 57* 37*   CREATININE 6.4* 4.4*   CALCIUM 7.1* 6.7*   PROT 5.0* 4.8*   ALBUMIN 2.0* 1.8*   BILITOT 0.2 0.1   ALKPHOS 74 73   AST 20 12   ALT 22 17   ANIONGAP 9 6*   EGFRNONAA 8* 13*     Urine Culture: No results for input(s): LABURIN in the last 4320 hours.  Wound Culture: No results for input(s): LABAERO in the last 4320 hours.    Significant Imaging: I have reviewed all pertinent imaging  results/findings within the past 24 hours.

## 2020-08-27 NOTE — HPI
"65 y.o. AAM with history significant for DMII, HTN, stroke, and CKD4 presents for evaluation of worsening CKD sent in by provider. Creatine = 2.0 1 year ago now with creatine 5.4 and potassium 5.8 worsening swelling and anasarca 2-3+ edema up to abdomen and flank, activity intolerance and orthopnea X 5 pillows. He is not on hemodialysis.  Amlodipine stopped.  Worsening BP and started on Hydralazine.  Nephrology consulted.  BP improved, but worsening Creat with persistent hyperkalemia.  Patient will need HD.  IR consulted for HD catheter.  Temporary HD catheter placed on 8/20 and patient underwent HD.  Catheter switched to tunneled catheter on 8/21.  Patient with right sided hemiplegia from previous stroke.  Deconditioned and PT/OT consulted.  Initial recommendation for SNF.  Patient will need outpatient HD arrangements and SW consulted.  Patient started spiking fevers overnight 8/25.  Unremarkable CXR.  BCx and UA obtained. Blood culture from the 25th is positive and the Gram stain demonstrates "budding yeasts." ID is consulted for that. Micafungin started. Patient feels well. No complaints. No visual changes.     "

## 2020-08-27 NOTE — PROGRESS NOTES
Miguel Moore is a 65 y.o. male patient.    Follow for ANA, CKD stg 4, dialysis    No new c/o, reportedly feeling OK  Comfortable    Scheduled Meds:   atorvastatin  80 mg Oral Daily    calcitRIOL  0.25 mcg Oral Daily    calcium acetate(phosphat bind)  667 mg Oral TID WM    clopidogreL  75 mg Oral Daily    epoetin marisol-epbx  10,000 Units Subcutaneous Every Tues, Thurs, Sat    furosemide  80 mg Oral Daily    heparin (porcine)  5,000 Units Subcutaneous Q8H    hydrALAZINE  25 mg Oral Q8H    labetaloL  100 mg Oral Q12H    lactulose  30 g Oral Once    magnesium oxide  400 mg Oral BID    micafungin (MYCAMINE) IVPB  100 mg Intravenous Q24H    phenytoin  100 mg Oral TID    tamsulosin  2 capsule Oral Daily    trazodone  50 mg Oral QHS       Review of patient's allergies indicates:   Allergen Reactions    Ace inhibitors      Hyperkalemia 8/2018    Penicillins Hives    Tizanidine      Felt hot         Vital Signs Range (Last 24H):  Temp:  [98 °F (36.7 °C)-99.2 °F (37.3 °C)]   Pulse:  [78-99]   Resp:  [16-18]   BP: ()/(48-73)   SpO2:  [96 %-98 %]     I & O (Last 24H):    Intake/Output Summary (Last 24 hours) at 8/27/2020 1046  Last data filed at 8/27/2020 0858  Gross per 24 hour   Intake 890 ml   Output 1700 ml   Net -810 ml           Physical Exam:  General appearance: well developed, well nourished, no distress  Lungs:  clear to auscultation bilaterally and normal respiratory effort  Heart: regular rate and rhythm  Abdomen: soft, non-tender non-distented; bowel sounds normal; no masses,  no organomegaly  Extremities: edema (+) edema    Laboratory:  CBC:   Recent Labs   Lab 08/27/20  0409   WBC 4.79   RBC 2.85*   HGB 7.3*   HCT 21.8*   *   MCV 77*   MCH 25.6*   MCHC 33.5     CMP:   Recent Labs   Lab 08/27/20  0409   *   CALCIUM 6.7*   ALBUMIN 1.8*   PROT 4.8*   *   K 4.0   CO2 26      BUN 37*   CREATININE 4.4*   ALKPHOS 73   ALT 17   AST 12   BILITOT 0.1        Imp/Plan    ANA on CKD stg 4 - ESRD, HD q MWF  HTN  DM type 2  Fever - culture (+) for yeast, on micafungin, await ID input  Proteinuria  Anemia of CKD    Continue present Rx  We'll follow for dialysis      Tramadeline Menard  8/27/2020

## 2020-08-27 NOTE — ASSESSMENT & PLAN NOTE
Admitted to inpatient status  On admit had anasarca up to abdomen.    Previously had CKD4 with baseline Cr of 3 but was 5.8 on admit.    Noted nephrotic proteinuria  Continue strict ins/outs and daily weights  Continue diuresis with lasix  Continue dialysis per nephrology  Dr Menard on board and input appreciated  Outpatient HD has been arranged  Had planned discharge today if remained afebrile and cultures negative.  While he is afebrile, his blood culture gram stain showed budding yeast.   Dr. Gomez consulted and case discussed with him.  Have started micafungin and will remove dialysis line after HD tomorrow.  Plan line holiday over weekend and new line on Monday.  Will discontinue vancomycin.

## 2020-08-27 NOTE — CONSULTS
Inpatient Radiology Pre-procedure Note     History of Present Illness:  Miguel Moore is a 65 y.o. 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    ID/Neph recommendations for IV Abx and removal of THDC for 'line holiday' over the weekend after HD session on 8/28/20.    New inpatient IR consult received for fluoroscopic-guided removal of the 19.0-cm RIJV-approach THDC.    Admission H&P reviewed.  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 Tay    CATARACT EXTRACTION W/  INTRAOCULAR LENS IMPLANT Left 10/19/2017    Dr. Tay    ESOPHAGOGASTRODUODENOSCOPY N/A 8/17/2020    Procedure: EGD (ESOPHAGOGASTRODUODENOSCOPY);  Surgeon: Desmond Chapman MD;  Location: [facility];  Service: Endoscopy;  Laterality: N/A;    FEMORAL ARTERY STENT      KNEE SURGERY      bilateral scope     Review of Systems:   As documented in primary team H&P    Home Meds:   Prior to Admission medications    Medication Sig Start Date End Date Taking? Authorizing Provider   amLODIPine (NORVASC) 10 MG tablet Take 1 tablet (10 mg total) by mouth once daily. 7/31/20 7/31/21 Yes Raciel Raymond MD   atorvastatin (LIPITOR) 80 MG tablet Take 1 tablet (80 mg total) by mouth once daily. 7/31/20 7/31/21 Yes Raciel Raymond MD   baclofen (LIORESAL) 10 MG tablet Take 1 tablet (10 mg total) by mouth 4 (four) times daily. 7/31/20 7/31/21 Yes Raciel Raymond MD   cholecalciferol, vitamin D3, (VITAMIN D3) 1,000 unit capsule Take 1 capsule (1,000 Units total) by mouth once  "daily. 1/4/19  Yes Raciel Raymond MD   clopidogreL (PLAVIX) 75 mg tablet Take 1 tablet (75 mg total) by mouth once daily. 7/31/20  Yes Raciel Raymond MD   furosemide (LASIX) 20 MG tablet Take 1 tablet (20 mg total) by mouth once daily. 7/31/20  Yes Raciel Raymond MD   insulin detemir U-100 (LEVEMIR FLEXTOUCH U-100 INSULN) 100 unit/mL (3 mL) InPn pen Inject 16 Units into the skin every evening. STOP NOVOLOG 8/13/20 8/13/21 Yes RAFAEL Jordan-MICHELLE   tamsulosin (FLOMAX) 0.4 mg Cap Take 2 capsules (0.8 mg total) by mouth once daily. 7/31/20  Yes Raciel Raymond MD   blood-glucose meter Misc 1 each by Misc.(Non-Drug; Combo Route) route once daily. Pharmacy-whichever brand specified by insurance 5/11/18 9/9/19  Raciel Raymond MD   furosemide (LASIX) 20 MG tablet Take two tablets twice a day 8/13/20   RAFAEL Jordan-MICHELLE   lancets (ONETOUCH DELICA LANCETS) 33 gauge Misc 1 lancet by Misc.(Non-Drug; Combo Route) route once daily. ONCE DAILY BLOOD SUGAR TESTING; [employer] BRAND COVERED BY INSURANCE 5/11/18 9/9/19  Raciel Raymond MD   pen needle, diabetic 31 gauge x 1/4" Ndle For mealtime insulin  Patient not taking: Reported on 8/13/2020 5/11/18   Raciel Raymond MD   phenytoin (DILANTIN) 100 MG ER capsule Take 1 capsule (100 mg total) by mouth 3 (three) times daily. 7/31/20   Raciel Raymond MD     Scheduled Meds:    atorvastatin  80 mg Oral Daily    calcitRIOL  0.25 mcg Oral Daily    calcium acetate(phosphat bind)  667 mg Oral TID WM    clopidogreL  75 mg Oral Daily    epoetin marisol-epbx  10,000 Units Subcutaneous Every Tues, Thurs, Sat    furosemide  80 mg Oral Daily    heparin (porcine)  5,000 Units Subcutaneous Q8H    hydrALAZINE  25 mg Oral Q8H    labetaloL  100 mg Oral Q12H    lactulose  30 g Oral Once    magnesium oxide  400 mg Oral BID    micafungin (MYCAMINE) IVPB  100 mg Intravenous Q24H    phenytoin  100 mg Oral TID    tamsulosin  2 capsule Oral Daily    trazodone  50 mg Oral QHS "     Continuous Infusions:   PRN Meds:acetaminophen, dextrose 50%, dextrose 50%, diazePAM, glucagon (human recombinant), glucose, glucose, hydrALAZINE, insulin aspart U-100, oxyCODONE-acetaminophen, sodium chloride 0.9%, sodium chloride 0.9%     Anticoagulants/Antiplatelets: aspirin, Plavix and Heparin    Allergies:   Review of patient's allergies indicates:   Allergen Reactions    Ace inhibitors      Hyperkalemia 8/2018    Penicillins Hives    Tizanidine      Felt hot     Sedation Hx: have not been any systemic reactions    Labs:  No results for input(s): INR in the last 168 hours.    Invalid input(s):  PT,  PTT    Recent Labs   Lab 08/27/20  0409   WBC 4.79   HGB 7.3*   HCT 21.8*   MCV 77*   *      Recent Labs   Lab 08/27/20  0409   *   *   K 4.0      CO2 26   BUN 37*   CREATININE 4.4*   CALCIUM 6.7*   MG 2.0   ALT 17   AST 12   ALBUMIN 1.8*   BILITOT 0.1     Physical Exam:  General: no acute distress  Mental Status: alert and oriented to person, place and time  HEENT: normocephalic, atraumatic  Chest: unlabored breathing  Heart: regular heart rate  Abdomen: nondistended  Extremity: moves all extremities    Vitals:  Temp: 99 °F (37.2 °C) (08/27/20 1140)  Pulse: 80 (08/27/20 1140)  Resp: 18 (08/27/20 1140)  BP: (!) 107/53 (08/27/20 1140)  SpO2: 97 % (08/27/20 1140)     A/P:  65 y.o. 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    1. 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     Request that HD session be scheduled no later than 9AM tomorrow to ensure HD complete at appropriate time for IR availability.     Of note, pt on MONE with ASA/Plavix as well as Heparin SubQ 5kU Q8H. There will  likely be expected oozing via THDC site after removal. Recommend holding Heparin tomorrow until THDC removal complete to ameliorate potential for oozing.    Risks (including, but not limited to, pain, bleeding, infection, damage to nearby structures, failure to obtain sufficient material for a diagnosis, the need for additional procedures, and death), benefits, and alternatives were discussed with the patient. All questions were answered to the best of my abilities. The patient wishes to proceed with the procedure. Written informed consent was obtained.    Thank you for considering IR for the care of your patient.     Eran Ravi MD  Interventional Radiology

## 2020-08-27 NOTE — ASSESSMENT & PLAN NOTE
Initially uncontrolled now well managed  Norvasc stopped due to lower extremity edema.  Continue hydrlazine and labetalol

## 2020-08-27 NOTE — SUBJECTIVE & OBJECTIVE
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 Tay    CATARACT EXTRACTION W/  INTRAOCULAR LENS IMPLANT Left 10/19/2017    Dr. Tay    ESOPHAGOGASTRODUODENOSCOPY N/A 8/17/2020    Procedure: EGD (ESOPHAGOGASTRODUODENOSCOPY);  Surgeon: Desmond Chapman MD;  Location: [facility];  Service: Endoscopy;  Laterality: N/A;    FEMORAL ARTERY STENT      KNEE SURGERY      bilateral scope       Review of patient's allergies indicates:   Allergen Reactions    Ace inhibitors      Hyperkalemia 8/2018    Penicillins Hives    Tizanidine      Felt hot       Medications:  Medications Prior to Admission   Medication Sig    amLODIPine (NORVASC) 10 MG tablet Take 1 tablet (10 mg total) by mouth once daily.    atorvastatin (LIPITOR) 80 MG tablet Take 1 tablet (80 mg total) by mouth once daily.    baclofen (LIORESAL) 10 MG tablet Take 1 tablet (10 mg total) by mouth 4 (four) times daily.    cholecalciferol, vitamin D3, (VITAMIN D3) 1,000 unit capsule Take 1 capsule (1,000 Units total) by mouth once daily.    clopidogreL (PLAVIX) 75 mg tablet Take 1 tablet (75 mg total) by mouth once daily.    furosemide (LASIX) 20 MG tablet Take 1 tablet (20 mg total) by mouth once daily.    insulin detemir U-100 (LEVEMIR FLEXTOUCH U-100 INSULN) 100 unit/mL (3 mL) InPn pen Inject 16 Units into the skin every evening. STOP NOVOLOG    tamsulosin (FLOMAX) 0.4 mg Cap Take 2 capsules (0.8 mg total) by mouth once daily.    blood-glucose meter Misc 1 each by Misc.(Non-Drug; Combo Route) route once daily. Pharmacy-whichever brand specified by insurance    furosemide (LASIX) 20 MG tablet Take two tablets twice a day    lancets (ONETOUCH DELICA LANCETS) 33 gauge Misc 1 lancet by  "Misc.(Non-Drug; Combo Route) route once daily. ONCE DAILY BLOOD SUGAR TESTING; WHICHEVER BRAND COVERED BY INSURANCE    pen needle, diabetic 31 gauge x 1/4" Ndle For mealtime insulin (Patient not taking: Reported on 8/13/2020)    phenytoin (DILANTIN) 100 MG ER capsule Take 1 capsule (100 mg total) by mouth 3 (three) times daily.     Antibiotics (From admission, onward)    None        Antifungals (From admission, onward)    Start     Stop Route Frequency Ordered    08/27/20 1045  micafungin 100 mg in sodium chloride 0.9 % 100 mL IVPB (ready to mix system)      -- IV Every 24 hours (non-standard times) 08/27/20 0938        Antivirals (From admission, onward)    None           Immunization History   Administered Date(s) Administered    Influenza - High Dose - PF (65 years and older) 01/17/2020    Influenza - Quadrivalent - PF *Preferred* (6 months and older) 01/04/2019    PPD Test 08/24/2020    Pneumococcal Conjugate - 13 Valent 09/09/2019    Pneumococcal Polysaccharide - 23 Valent 05/08/2017    Tdap 05/08/2017    Zoster Recombinant 07/31/2020       Family History     Problem Relation (Age of Onset)    Cataracts Mother    Diabetes Mother    Heart disease Mother    Hypertension Mother, Sister    No Known Problems Father, Brother, Daughter, Maternal Aunt, Maternal Uncle, Paternal Aunt, Paternal Uncle, Maternal Grandmother, Maternal Grandfather, Paternal Grandmother, Paternal Grandfather        Social History     Socioeconomic History    Marital status: Single     Spouse name: Not on file    Number of children: Not on file    Years of education: Not on file    Highest education level: Not on file   Occupational History    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, etc   Social Needs    Financial resource strain: Not on file    Food insecurity     Worry: Not on file     Inability: Not on file    Transportation needs     Medical: Not on file     Non-medical: Not on file   Tobacco Use    " Smoking status: Never Smoker    Smokeless tobacco: Never Used   Substance and Sexual Activity    Alcohol use: No    Drug use: No    Sexual activity: Not on file   Lifestyle    Physical activity     Days per week: Not on file     Minutes per session: Not on file    Stress: Not on file   Relationships    Social connections     Talks on phone: Not on file     Gets together: Not on file     Attends Alevism service: Not on file     Active member of club or organization: Not on file     Attends meetings of clubs or organizations: Not on file     Relationship status: Not on file   Other Topics Concern    Not on file   Social History Narrative    Not on file     Review of Systems   Constitutional: Negative for chills and fever.   Neurological: Positive for facial asymmetry and speech difficulty.   All other systems reviewed and are negative.    Objective:     Vital Signs (Most Recent):  Temp: 99 °F (37.2 °C) (08/27/20 1140)  Pulse: 80 (08/27/20 1140)  Resp: 18 (08/27/20 1140)  BP: (!) 107/53 (08/27/20 1140)  SpO2: 97 % (08/27/20 1140) Vital Signs (24h Range):  Temp:  [98 °F (36.7 °C)-99.2 °F (37.3 °C)] 99 °F (37.2 °C)  Pulse:  [78-99] 80  Resp:  [16-18] 18  SpO2:  [96 %-98 %] 97 %  BP: ()/(48-73) 107/53     Weight: 97.9 kg (215 lb 13.3 oz)  Body mass index is 32.82 kg/m².    Estimated Creatinine Clearance: 18.7 mL/min (A) (based on SCr of 4.4 mg/dL (H)).    Physical Exam  Vitals signs and nursing note reviewed.   Constitutional:       General: He is not in acute distress.     Appearance: Normal appearance. He is normal weight. He is not ill-appearing, toxic-appearing or diaphoretic.   HENT:      Head: Normocephalic and atraumatic.      Right Ear: External ear normal.      Left Ear: External ear normal.      Mouth/Throat:      Mouth: Mucous membranes are moist.   Eyes:      Extraocular Movements: Extraocular movements intact.      Conjunctiva/sclera: Conjunctivae normal.      Pupils: Pupils are equal, round,  and reactive to light.   Neck:      Musculoskeletal: Normal range of motion and neck supple.   Cardiovascular:      Rate and Rhythm: Normal rate and regular rhythm.   Pulmonary:      Effort: Pulmonary effort is normal.      Breath sounds: Normal breath sounds.   Abdominal:      General: Abdomen is flat. Bowel sounds are normal.      Palpations: Abdomen is soft.   Musculoskeletal: Normal range of motion.         General: No swelling or tenderness.   Skin:     General: Skin is warm and dry.      Coloration: Skin is not jaundiced.   Neurological:      General: No focal deficit present.      Mental Status: He is alert and oriented to person, place, and time.   Psychiatric:         Mood and Affect: Mood normal.         Behavior: Behavior normal.         Thought Content: Thought content normal.         Judgment: Judgment normal.         Significant Labs:   Blood Culture:   Recent Labs   Lab 08/25/20  0200   LABBLOO Gram stain kelsey bottle: budding yeast   08/26/2020  21:10    Results called to and read back by: JB BEAUCHAMP/[id number]     CBC:   Recent Labs   Lab 08/26/20  0131 08/26/20  0849 08/27/20  0409   WBC 4.41 5.31 4.79   HGB 7.4* 8.1* 7.3*   HCT 22.2* 24.5* 21.8*   * 155 129*     CMP:   Recent Labs   Lab 08/26/20  0131 08/27/20  0409   * 134*   K 4.5 4.0   CL 95 102   CO2 24 26   * 168*   BUN 57* 37*   CREATININE 6.4* 4.4*   CALCIUM 7.1* 6.7*   PROT 5.0* 4.8*   ALBUMIN 2.0* 1.8*   BILITOT 0.2 0.1   ALKPHOS 74 73   AST 20 12   ALT 22 17   ANIONGAP 9 6*   EGFRNONAA 8* 13*     Urine Culture: No results for input(s): LABURIN in the last 4320 hours.  Wound Culture: No results for input(s): LABAERO in the last 4320 hours.    Significant Imaging: I have reviewed all pertinent imaging results/findings within the past 24 hours.

## 2020-08-27 NOTE — PLAN OF CARE
Pt was accepted to [facility] to start first treatment tomorrow 8/28/20; pt developed fever and is scheduled for a line holiday; pt will remain in hospital over weekend; [place] contacted [facility] admission center and spoke with Lien to inform; stated that she will change patient's schedule to next week; [place] to follow up with final discharge date     08/27/20 1607   Post-Acute Status   Post-Acute Authorization Dialysis   Diaylsis Status Discharge Plan Changed

## 2020-08-27 NOTE — ASSESSMENT & PLAN NOTE
- blood culture obtained for fever  - d/w Micro: yeast  - continue micafungin  - d/w Dr. Menard: line holiday after HD tomorrow  - recommend Ophth consult per IDSA recommendations  - repeat blood cultures today

## 2020-08-27 NOTE — PROGRESS NOTES
08/26/20 1855   Post-Hemodialysis Assessment   Rinseback Volume (mL) 250 mL   Blood Volume Processed (Liters) 62.8 L   Dialyzer Clearance Moderately streaked   Duration of Treatment (minutes) 210 minutes   Hemodialysis Intake (mL) 250 mL   Total UF (mL) 1700 mL   Net Fluid Removal 1000   Patient Response to Treatment tolerated   Post-Treatment Weight 101.2 kg (223 lb 1.7 oz)   Treatment Weight Change -1

## 2020-08-27 NOTE — ASSESSMENT & PLAN NOTE
Stable, no acute issue, continue plavix/statin.  PT/OT evaluation.  Anticipate discharge to SNF when medically stable

## 2020-08-28 PROBLEM — B37.7 CANDIDEMIA: Status: RESOLVED | Noted: 2020-08-27 | Resolved: 2020-08-28

## 2020-08-28 PROBLEM — B49 FUNGEMIA: Status: RESOLVED | Noted: 2020-08-25 | Resolved: 2020-08-28

## 2020-08-28 LAB
ALBUMIN SERPL BCP-MCNC: 1.8 G/DL (ref 3.5–5.2)
ALP SERPL-CCNC: 73 U/L (ref 55–135)
ALT SERPL W/O P-5'-P-CCNC: 19 U/L (ref 10–44)
ANION GAP SERPL CALC-SCNC: 10 MMOL/L (ref 8–16)
AST SERPL-CCNC: 13 U/L (ref 10–40)
BACTERIA BLD CULT: ABNORMAL
BASOPHILS # BLD AUTO: 0.02 K/UL (ref 0–0.2)
BASOPHILS NFR BLD: 0.4 % (ref 0–1.9)
BILIRUB SERPL-MCNC: 0.1 MG/DL (ref 0.1–1)
BUN SERPL-MCNC: 47 MG/DL (ref 8–23)
CALCIUM SERPL-MCNC: 6.9 MG/DL (ref 8.7–10.5)
CHLORIDE SERPL-SCNC: 102 MMOL/L (ref 95–110)
CO2 SERPL-SCNC: 24 MMOL/L (ref 23–29)
CREAT SERPL-MCNC: 5 MG/DL (ref 0.5–1.4)
DIFFERENTIAL METHOD: ABNORMAL
EOSINOPHIL # BLD AUTO: 0.2 K/UL (ref 0–0.5)
EOSINOPHIL NFR BLD: 3.1 % (ref 0–8)
ERYTHROCYTE [DISTWIDTH] IN BLOOD BY AUTOMATED COUNT: 13.2 % (ref 11.5–14.5)
EST. GFR  (AFRICAN AMERICAN): 13 ML/MIN/1.73 M^2
EST. GFR  (NON AFRICAN AMERICAN): 11 ML/MIN/1.73 M^2
GLUCOSE SERPL-MCNC: 140 MG/DL (ref 70–110)
HCT VFR BLD AUTO: 22 % (ref 40–54)
HGB BLD-MCNC: 7.3 G/DL (ref 14–18)
IMM GRANULOCYTES # BLD AUTO: 0.02 K/UL (ref 0–0.04)
IMM GRANULOCYTES NFR BLD AUTO: 0.4 % (ref 0–0.5)
LYMPHOCYTES # BLD AUTO: 1 K/UL (ref 1–4.8)
LYMPHOCYTES NFR BLD: 18.6 % (ref 18–48)
MAGNESIUM SERPL-MCNC: 2 MG/DL (ref 1.6–2.6)
MCH RBC QN AUTO: 25 PG (ref 27–31)
MCHC RBC AUTO-ENTMCNC: 33.2 G/DL (ref 32–36)
MCV RBC AUTO: 75 FL (ref 82–98)
MONOCYTES # BLD AUTO: 0.8 K/UL (ref 0.3–1)
MONOCYTES NFR BLD: 14.9 % (ref 4–15)
NEUTROPHILS # BLD AUTO: 3.2 K/UL (ref 1.8–7.7)
NEUTROPHILS NFR BLD: 62.6 % (ref 38–73)
NRBC BLD-RTO: 0 /100 WBC
PHOSPHATE SERPL-MCNC: 3.5 MG/DL (ref 2.7–4.5)
PLATELET # BLD AUTO: 147 K/UL (ref 150–350)
PMV BLD AUTO: 9.6 FL (ref 9.2–12.9)
POCT GLUCOSE: 116 MG/DL (ref 70–110)
POCT GLUCOSE: 144 MG/DL (ref 70–110)
POCT GLUCOSE: 192 MG/DL (ref 70–110)
POCT GLUCOSE: 234 MG/DL (ref 70–110)
POTASSIUM SERPL-SCNC: 4.1 MMOL/L (ref 3.5–5.1)
PROT SERPL-MCNC: 4.9 G/DL (ref 6–8.4)
RBC # BLD AUTO: 2.92 M/UL (ref 4.6–6.2)
SODIUM SERPL-SCNC: 136 MMOL/L (ref 136–145)
VANCOMYCIN SERPL-MCNC: 14.7 UG/ML
WBC # BLD AUTO: 5.1 K/UL (ref 3.9–12.7)

## 2020-08-28 PROCEDURE — 87040 BLOOD CULTURE FOR BACTERIA: CPT | Mod: 59

## 2020-08-28 PROCEDURE — 80202 ASSAY OF VANCOMYCIN: CPT

## 2020-08-28 PROCEDURE — 63700000 PHARM REV CODE 250 ALT 637 W/O HCPCS: Performed by: INTERNAL MEDICINE

## 2020-08-28 PROCEDURE — 25000003 PHARM REV CODE 250: Performed by: INTERNAL MEDICINE

## 2020-08-28 PROCEDURE — 80053 COMPREHEN METABOLIC PANEL: CPT

## 2020-08-28 PROCEDURE — 83735 ASSAY OF MAGNESIUM: CPT

## 2020-08-28 PROCEDURE — 84100 ASSAY OF PHOSPHORUS: CPT

## 2020-08-28 PROCEDURE — 99233 PR SUBSEQUENT HOSPITAL CARE,LEVL III: ICD-10-PCS | Mod: ,,, | Performed by: INTERNAL MEDICINE

## 2020-08-28 PROCEDURE — 85025 COMPLETE CBC W/AUTO DIFF WBC: CPT

## 2020-08-28 PROCEDURE — 21400001 HC TELEMETRY ROOM

## 2020-08-28 PROCEDURE — 80100016 HC MAINTENANCE HEMODIALYSIS

## 2020-08-28 PROCEDURE — 36415 COLL VENOUS BLD VENIPUNCTURE: CPT

## 2020-08-28 PROCEDURE — 25000003 PHARM REV CODE 250: Performed by: HOSPITALIST

## 2020-08-28 PROCEDURE — 99233 SBSQ HOSP IP/OBS HIGH 50: CPT | Mod: ,,, | Performed by: INTERNAL MEDICINE

## 2020-08-28 PROCEDURE — 87070 CULTURE OTHR SPECIMN AEROBIC: CPT

## 2020-08-28 RX ORDER — FLUCONAZOLE 100 MG/1
200 TABLET ORAL NIGHTLY
Status: DISCONTINUED | OUTPATIENT
Start: 2020-08-28 | End: 2020-09-01 | Stop reason: HOSPADM

## 2020-08-28 RX ADMIN — CALCIUM ACETATE 667 MG: 667 CAPSULE ORAL at 05:08

## 2020-08-28 RX ADMIN — LABETALOL HYDROCHLORIDE 100 MG: 100 TABLET, FILM COATED ORAL at 09:08

## 2020-08-28 RX ADMIN — CALCITRIOL CAPSULES 0.25 MCG 0.25 MCG: 0.25 CAPSULE ORAL at 01:08

## 2020-08-28 RX ADMIN — TRAZODONE HYDROCHLORIDE 50 MG: 50 TABLET ORAL at 09:08

## 2020-08-28 RX ADMIN — MAGNESIUM OXIDE TAB 400 MG (241.3 MG ELEMENTAL MG) 400 MG: 400 (241.3 MG) TAB at 09:08

## 2020-08-28 RX ADMIN — ATORVASTATIN CALCIUM 80 MG: 40 TABLET, FILM COATED ORAL at 01:08

## 2020-08-28 RX ADMIN — INSULIN ASPART 2 UNITS: 100 INJECTION, SOLUTION INTRAVENOUS; SUBCUTANEOUS at 09:08

## 2020-08-28 RX ADMIN — TAMSULOSIN HYDROCHLORIDE 0.8 MG: 0.4 CAPSULE ORAL at 01:08

## 2020-08-28 RX ADMIN — OXYCODONE HYDROCHLORIDE AND ACETAMINOPHEN 1 TABLET: 5; 325 TABLET ORAL at 01:08

## 2020-08-28 RX ADMIN — CALCIUM ACETATE 667 MG: 667 CAPSULE ORAL at 01:08

## 2020-08-28 RX ADMIN — INSULIN ASPART 2 UNITS: 100 INJECTION, SOLUTION INTRAVENOUS; SUBCUTANEOUS at 03:08

## 2020-08-28 RX ADMIN — FLUCONAZOLE 200 MG: 100 TABLET ORAL at 09:08

## 2020-08-28 RX ADMIN — CLOPIDOGREL 75 MG: 75 TABLET, FILM COATED ORAL at 01:08

## 2020-08-28 RX ADMIN — PHENYTOIN SODIUM 100 MG: 100 CAPSULE ORAL at 09:08

## 2020-08-28 RX ADMIN — HYDRALAZINE HYDROCHLORIDE 25 MG: 25 TABLET, FILM COATED ORAL at 09:08

## 2020-08-28 RX ADMIN — OXYCODONE HYDROCHLORIDE AND ACETAMINOPHEN 1 TABLET: 5; 325 TABLET ORAL at 09:08

## 2020-08-28 RX ADMIN — FUROSEMIDE 80 MG: 40 TABLET ORAL at 01:08

## 2020-08-28 NOTE — SUBJECTIVE & OBJECTIVE
Interval History: Seen briefly in HD. No complaints. Tolerating micafungin.     Review of Systems   Constitutional: Negative for chills and fever.   All other systems reviewed and are negative.    Objective:     Vital Signs (Most Recent):  Temp: 98.6 °F (37 °C) (08/28/20 0823)  Pulse: 81 (08/28/20 0930)  Resp: 18 (08/28/20 0823)  BP: 136/60 (08/28/20 0830)  SpO2: 98 % (08/28/20 0823) Vital Signs (24h Range):  Temp:  [98.4 °F (36.9 °C)-99.4 °F (37.4 °C)] 98.6 °F (37 °C)  Pulse:  [78-88] 81  Resp:  [16-18] 18  SpO2:  [97 %-98 %] 98 %  BP: (113-136)/(54-63) 136/60     Weight: 99 kg (218 lb 4.1 oz)  Body mass index is 33.19 kg/m².    Estimated Creatinine Clearance: 16.6 mL/min (A) (based on SCr of 5 mg/dL (H)).    Physical Exam  Nursing note reviewed.   Constitutional:       Appearance: Normal appearance. He is normal weight.   HENT:      Head: Normocephalic and atraumatic.   Cardiovascular:      Rate and Rhythm: Normal rate and regular rhythm.   Pulmonary:      Effort: No respiratory distress.      Breath sounds: Normal breath sounds. No wheezing.   Abdominal:      General: Abdomen is flat. Bowel sounds are normal.      Palpations: Abdomen is soft.   Musculoskeletal: Normal range of motion.   Skin:     General: Skin is warm.   Neurological:      General: No focal deficit present.      Mental Status: He is alert and oriented to person, place, and time.   Psychiatric:         Mood and Affect: Mood normal.         Behavior: Behavior normal.         Thought Content: Thought content normal.         Judgment: Judgment normal.         Significant Labs:   Blood Culture:   Recent Labs   Lab 08/25/20  0200   LABBLOO Gram stain kelsey bottle: budding yeast   08/26/2020  21:10    Results called to and read back by: JB BEAUCHAMP/[id number]  CANDIDA PARAPSILOSIS*     CBC:   Recent Labs   Lab 08/27/20  0409 08/28/20  0310   WBC 4.79 5.10   HGB 7.3* 7.3*   HCT 21.8* 22.0*   * 147*     CMP:   Recent Labs   Lab 08/27/20  0400  08/28/20  0310   * 136   K 4.0 4.1    102   CO2 26 24   * 140*   BUN 37* 47*   CREATININE 4.4* 5.0*   CALCIUM 6.7* 6.9*   PROT 4.8* 4.9*   ALBUMIN 1.8* 1.8*   BILITOT 0.1 0.1   ALKPHOS 73 73   AST 12 13   ALT 17 19   ANIONGAP 6* 10   EGFRNONAA 13* 11*       Significant Imaging: I have reviewed all pertinent imaging results/findings within the past 24 hours.

## 2020-08-28 NOTE — ASSESSMENT & PLAN NOTE
-Anemia of CKD  -No evidence of bleeding.  -Hb has dropped today but remains above transfusion threshold  -Check iron, ferritin, b12 and folate.  -Repeat cbc in AM

## 2020-08-28 NOTE — ASSESSMENT & PLAN NOTE
-Admitted to inpatient status  -On admit had anasarca up to abdomen.    -Previously had CKD4 with baseline Cr of 3 but was 5.8 on admit.    -Noted nephrotic proteinuria  -Continue strict ins/outs and daily weights  -Continue diuresis with lasix  -Continue dialysis per nephrology  -Dr Menard on board and input appreciated  -Outpatient HD has been arranged  -Had planned discharge 8/27 if remained afebrile and cultures negative.  Unfortunately his blood culture gram stain showed budding yeast. No growth so far though  -Dr. Gomez with ID consulted and case discussed with him.  Started micafungin 8/27.  -Dialysis line removed after HD today and plan line holiday over weekend.  -Plan new line by IR on Monday.

## 2020-08-28 NOTE — ASSESSMENT & PLAN NOTE
-Stable, no acute issue, continue plavix/statin.  -PT/OT evaluation.  -Anticipate discharge to SNF when medically stable

## 2020-08-28 NOTE — NURSING
Report given to oncoming nurse Tara MAIER, Patient is awake and alert. Bed is in lowest position, wheels locked, call light is in reach. 12 hour chart check completed

## 2020-08-28 NOTE — ASSESSMENT & PLAN NOTE
-Initially uncontrolled now well managed  -Norvasc stopped due to lower extremity edema.  -Continue hydrlazine and labetalol

## 2020-08-28 NOTE — ASSESSMENT & PLAN NOTE
- blood culture obtained for fever  - Candida parapsilosis  - change to fluconazole  - line holiday after HD tomorrow  - recommend Ophth consult per IDSA recommendations  - repeat blood cultures pending

## 2020-08-28 NOTE — SUBJECTIVE & OBJECTIVE
Interval History: No acute events overnight.  Seen in dialysis.  Still very weak and with significant edema up to his thighs.  Remains afebrile.  Denies cp and sob.    Review of Systems   Constitutional: Positive for activity change. Negative for appetite change and fever.   HENT: Negative for ear discharge and ear pain.    Eyes: Negative for discharge and itching.   Respiratory: Negative for cough and chest tightness.    Cardiovascular: Positive for leg swelling. Negative for chest pain.   Gastrointestinal: Negative for abdominal distention and abdominal pain.   Endocrine: Negative for cold intolerance and heat intolerance.   Genitourinary: Negative for difficulty urinating and dysuria.   Musculoskeletal: Negative for arthralgias and back pain.   Neurological: Positive for weakness. Negative for seizures and syncope.   Psychiatric/Behavioral: Negative for agitation, confusion and dysphoric mood.     Objective:     Vital Signs (Most Recent):  Temp: 98.6 °F (37 °C) (08/28/20 1552)  Pulse: 89 (08/28/20 1552)  Resp: 16 (08/28/20 1552)  BP: (!) 131/54 (08/28/20 1552)  SpO2: 98 % (08/28/20 1552) Vital Signs (24h Range):  Temp:  [98.4 °F (36.9 °C)-98.9 °F (37.2 °C)] 98.6 °F (37 °C)  Pulse:  [78-98] 89  Resp:  [16-18] 16  SpO2:  [97 %-99 %] 98 %  BP: (114-136)/(54-63) 131/54     Weight: 99 kg (218 lb 4.1 oz)  Body mass index is 33.19 kg/m².    Intake/Output Summary (Last 24 hours) at 8/28/2020 1848  Last data filed at 8/28/2020 1400  Gross per 24 hour   Intake 240 ml   Output --   Net 240 ml      Physical Exam  Vitals signs and nursing note reviewed.   Constitutional:       General: He is not in acute distress.     Appearance: He is well-developed.   HENT:      Head: Normocephalic and atraumatic.      Right Ear: External ear normal.      Left Ear: External ear normal.      Nose: Nose normal.   Eyes:      Conjunctiva/sclera: Conjunctivae normal.      Pupils: Pupils are equal, round, and reactive to light.   Neck:       Musculoskeletal: Normal range of motion and neck supple.   Cardiovascular:      Rate and Rhythm: Normal rate and regular rhythm.   Pulmonary:      Effort: Pulmonary effort is normal. No respiratory distress.      Breath sounds: No wheezing.   Abdominal:      General: Bowel sounds are normal. There is no distension.      Palpations: Abdomen is soft.      Tenderness: There is no abdominal tenderness.      Comments: No palpable hepatomegaly or splenomegaly   Musculoskeletal:         General: Swelling present. No tenderness.      Comments: Deep edema up to both thighs   Skin:     General: Skin is warm and dry.   Neurological:      Mental Status: He is alert and oriented to person, place, and time.      Comments: Right sided hemiplegia   Psychiatric:         Thought Content: Thought content normal.         Significant Labs: All pertinent labs within the past 24 hours have been reviewed.    Significant Imaging: I have reviewed and interpreted all pertinent imaging results/findings within the past 24 hours.

## 2020-08-28 NOTE — PROGRESS NOTES
[facility] Hospital Medicine  Progress Note    Patient Name: Miguel Moore  MRN: [id number]  Patient Class: IP- Inpatient   Admission Date: 8/15/2020  Length of Stay: 12 days  Attending Physician: Clement Wolfe MD  Primary Care Provider: Raciel Raymond MD        Subjective:     Principal Problem:ESRD (end stage renal disease)        HPI:  65 y.o. 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  States he was called on 8/13/2020, but did not come to the ED until today.  He reports mild edema and weight gain of 1-2 lb.  Denies fever, chills, cough, SOB, chest pain, palpitations, orthopnea, PND, dizziness, syncope, n/v/d, abd pain, or dysuria.  In the ED, Cr/BUN 5.3/64, K+ 5.4, H/H 7.5/22.7, CPK 1262, , UA shows proteinura 3+, renal US suggest chronic medical renal disease.  His Nephrologist is Dr. Roa, last visit Oct 2019.  Nephrology consult.  Placed in observation.    Overview/Hospital Course:  65 y.o. AAM with history significant for DMII, HTN, stroke, and CKD4 presents for evaluation of worsening CKD sent in by provider. Creatine = 2.0 1 year ago now with creatine 5.4 and potassium 5.8 worsening swelling and anasarca 2-3+ edema up to abdomen and flank, activity intolerance and orthopnea X 5 pillows. He is not on hemodialysis.  Amlodipine stopped.  Worsening BP and started on Hydralazine.  Nephrology consulted.  BP improved, but worsening Creat with persistent hyperkalemia.  Patient will need HD.  IR consulted for HD catheter.  Temporary HD catheter placed on 8/20 and patient underwent HD.  Catheter switched to tunneled catheter on 8/21.  Patient with right sided hemiplegia from previous stroke.  Deconditioned and PT/OT consulted.  Initial recommendation for SNF.  Patient will need outpatient HD  arrangements and SW consulted.  Patient started spiking fevers overnight 8/25.  Unremarkable CXR.  BCx and UA obtained.    Interval History: No acute events overnight.  Seen in dialysis.  Still very weak and with significant edema up to his thighs.  Remains afebrile.  Denies cp and sob.    Review of Systems   Constitutional: Positive for activity change. Negative for appetite change and fever.   HENT: Negative for ear discharge and ear pain.    Eyes: Negative for discharge and itching.   Respiratory: Negative for cough and chest tightness.    Cardiovascular: Positive for leg swelling. Negative for chest pain.   Gastrointestinal: Negative for abdominal distention and abdominal pain.   Endocrine: Negative for cold intolerance and heat intolerance.   Genitourinary: Negative for difficulty urinating and dysuria.   Musculoskeletal: Negative for arthralgias and back pain.   Neurological: Positive for weakness. Negative for seizures and syncope.   Psychiatric/Behavioral: Negative for agitation, confusion and dysphoric mood.     Objective:     Vital Signs (Most Recent):  Temp: 98.6 °F (37 °C) (08/28/20 1552)  Pulse: 89 (08/28/20 1552)  Resp: 16 (08/28/20 1552)  BP: (!) 131/54 (08/28/20 1552)  SpO2: 98 % (08/28/20 1552) Vital Signs (24h Range):  Temp:  [98.4 °F (36.9 °C)-98.9 °F (37.2 °C)] 98.6 °F (37 °C)  Pulse:  [78-98] 89  Resp:  [16-18] 16  SpO2:  [97 %-99 %] 98 %  BP: (114-136)/(54-63) 131/54     Weight: 99 kg (218 lb 4.1 oz)  Body mass index is 33.19 kg/m².    Intake/Output Summary (Last 24 hours) at 8/28/2020 1848  Last data filed at 8/28/2020 1400  Gross per 24 hour   Intake 240 ml   Output --   Net 240 ml      Physical Exam  Vitals signs and nursing note reviewed.   Constitutional:       General: He is not in acute distress.     Appearance: He is well-developed.   HENT:      Head: Normocephalic and atraumatic.      Right Ear: External ear normal.      Left Ear: External ear normal.      Nose: Nose normal.   Eyes:       Conjunctiva/sclera: Conjunctivae normal.      Pupils: Pupils are equal, round, and reactive to light.   Neck:      Musculoskeletal: Normal range of motion and neck supple.   Cardiovascular:      Rate and Rhythm: Normal rate and regular rhythm.   Pulmonary:      Effort: Pulmonary effort is normal. No respiratory distress.      Breath sounds: No wheezing.   Abdominal:      General: Bowel sounds are normal. There is no distension.      Palpations: Abdomen is soft.      Tenderness: There is no abdominal tenderness.      Comments: No palpable hepatomegaly or splenomegaly   Musculoskeletal:         General: Swelling present. No tenderness.      Comments: Deep edema up to both thighs   Skin:     General: Skin is warm and dry.   Neurological:      Mental Status: He is alert and oriented to person, place, and time.      Comments: Right sided hemiplegia   Psychiatric:         Thought Content: Thought content normal.         Significant Labs: All pertinent labs within the past 24 hours have been reviewed.    Significant Imaging: I have reviewed and interpreted all pertinent imaging results/findings within the past 24 hours.      Assessment/Plan:      * ESRD (end stage renal disease)  -Admitted to inpatient status  -On admit had anasarca up to abdomen.    -Previously had CKD4 with baseline Cr of 3 but was 5.8 on admit.    -Noted nephrotic proteinuria  -Continue strict ins/outs and daily weights  -Continue diuresis with lasix  -Continue dialysis per nephrology  -Dr Menard on board and input appreciated  -Outpatient HD has been arranged  -Had planned discharge 8/27 if remained afebrile and cultures negative.  Unfortunately his blood culture gram stain showed budding yeast. No growth so far though  -Dr. Gomez with ID consulted and case discussed with him.  Started micafungin 8/27.  -Dialysis line removed after HD today and plan line holiday over weekend.  -Plan new line by IR on Monday.    Anasarca  -Treatment as above    Nephrotic  syndrome  -Treatment as above    Hypocalcemia  -Corrected calcium is 8.9  -Continue calcitrol 0.25 mcg and calcium acetate 667 po tid AC    Anemia  -Anemia of CKD  -No evidence of bleeding.  -Hb has dropped today but remains above transfusion threshold  -Check iron, ferritin, b12 and folate.  -Repeat cbc in AM        Enlarged prostate  -Continue home flomax - voiding well - strict I&Os      Hemiplegia and hemiparesis following cerebral infarction affecting right dominant side  -Stable, no acute issue, continue plavix/statin.  -PT/OT evaluation.  -Anticipate discharge to SNF when medically stable    Seizure disorder as sequela of cerebrovascular accident  -Continue home regimen of phenytoin  -No seizures.      Benign non-nodular prostatic hyperplasia without lower urinary tract symptoms  -Continue home tamsulosin     Controlled type 2 diabetes mellitus with stage 4 chronic kidney disease, with long-term current use of insulin  -HgbA1c 5.8  -Hold oral antihyperglycemics while inpatient  -PRN sliding scale insulin  -ACHS glucose monitoring   -ADA diet     Pure hypercholesterolemia  -Continue statin    Benign essential HTN; ACEI hyperK  -Initially uncontrolled now well managed  -Norvasc stopped due to lower extremity edema.  -Continue hydrlazine and labetalol      VTE Risk Mitigation (From admission, onward)         Ordered     IP VTE HIGH RISK PATIENT  Once      08/15/20 1505     Place sequential compression device  Until discontinued      08/15/20 1505                Discharge Planning   GARY:      Code Status: Full Code   Is the patient medically ready for discharge?:     Reason for patient still in hospital (select all that apply): Treatment  Discharge Plan A: Skilled Nursing Facility   Discharge Delays: (!) Change in Medical Condition              Clement Wolfe MD  Department of Hospital Medicine   [facility]

## 2020-08-28 NOTE — ASSESSMENT & PLAN NOTE
-HgbA1c 5.8  -Hold oral antihyperglycemics while inpatient  -PRN sliding scale insulin  -ACHS glucose monitoring   -ADA diet

## 2020-08-28 NOTE — PT/OT/SLP PROGRESS
Occupational Therapy      Patient Name:  Miguel Moore   MRN:  [id number]    Patient not seen today secondary to Dialysis in AM and pt off the floor to IR procedure in PM. Will follow-up as able.    GENEVA Mckenzie  8/28/2020

## 2020-08-28 NOTE — PROGRESS NOTES
Miguel Moore is a 65 y.o. male patient.    Follow for ANA, CKD stg 4, dialysis    Patient seen while on dialysis  No new c/o, comfortable    Scheduled Meds:   atorvastatin  80 mg Oral Daily    calcitRIOL  0.25 mcg Oral Daily    calcium acetate(phosphat bind)  667 mg Oral TID WM    clopidogreL  75 mg Oral Daily    epoetin marisol-epbx  10,000 Units Subcutaneous Every Tues, Thurs, Sat    furosemide  80 mg Oral Daily    hydrALAZINE  25 mg Oral Q8H    labetaloL  100 mg Oral Q12H    lactulose  30 g Oral Once    magnesium oxide  400 mg Oral BID    micafungin (MYCAMINE) IVPB  100 mg Intravenous Q24H    phenytoin  100 mg Oral TID    tamsulosin  2 capsule Oral Daily    trazodone  50 mg Oral QHS       Review of patient's allergies indicates:   Allergen Reactions    Ace inhibitors      Hyperkalemia 8/2018    Penicillins Hives    Tizanidine      Felt hot         Vital Signs Range (Last 24H):  Temp:  [98.4 °F (36.9 °C)-99.4 °F (37.4 °C)]   Pulse:  [79-82]   Resp:  [16-18]   BP: (107-125)/(53-61)   SpO2:  [97 %-98 %]     I & O (Last 24H):    Intake/Output Summary (Last 24 hours) at 8/28/2020 0933  Last data filed at 8/27/2020 1811  Gross per 24 hour   Intake 240 ml   Output --   Net 240 ml           Physical Exam:  General appearance: well developed, well nourished, no distress  Lungs:  clear to auscultation bilaterally and normal respiratory effort  Heart: regular rate and rhythm  Abdomen: soft, non-tender non-distented; bowel sounds normal; no masses,  no organomegaly  Extremities: edema (+)    Laboratory:  CBC:   Recent Labs   Lab 08/28/20  0310   WBC 5.10   RBC 2.92*   HGB 7.3*   HCT 22.0*   *   MCV 75*   MCH 25.0*   MCHC 33.2     CMP:   Recent Labs   Lab 08/28/20  0310   *   CALCIUM 6.9*   ALBUMIN 1.8*   PROT 4.9*      K 4.1   CO2 24      BUN 47*   CREATININE 5.0*   ALKPHOS 73   ALT 19   AST 13   BILITOT 0.1       Imp/Plan    ANA on CKD stg 4 - ESRD, on dialysis, stable,  tolerated well  HTN  DM type 2  Proteinuria  Fungemia  Anemia of CKD    Tunneled cath removal after dialysis today  Line holiday for the weekend  Continue Abx      Trac T Le  8/28/2020

## 2020-08-28 NOTE — PROGRESS NOTES
"[facility]  Infectious Disease  Progress Note    Patient Name: Miguel Moore  MRN: [id number]  Admission Date: 8/15/2020  Length of Stay: 12 days  Attending Physician: Clement Wolfe MD  Primary Care Provider: Raciel Raymond MD    Isolation Status: No active isolations  Assessment/Plan:      Candidemia  - blood culture obtained for fever  - Candida parapsilosis  - change to fluconazole  - line holiday after HD tomorrow  - recommend Ophth consult per IDSA recommendations  - repeat blood cultures pending      Thank you for your consult. I will follow-up with patient. Please contact us if you have any additional questions.    Emil Ramirez MD  Infectious Disease  [facility]    Subjective:     Principal Problem:ESRD (end stage renal disease)    HPI: 65 y.o. AAM with history significant for DMII, HTN, stroke, and CKD4 presents for evaluation of worsening CKD sent in by provider. Creatine = 2.0 1 year ago now with creatine 5.4 and potassium 5.8 worsening swelling and anasarca 2-3+ edema up to abdomen and flank, activity intolerance and orthopnea X 5 pillows. He is not on hemodialysis.  Amlodipine stopped.  Worsening BP and started on Hydralazine.  Nephrology consulted.  BP improved, but worsening Creat with persistent hyperkalemia.  Patient will need HD.  IR consulted for HD catheter.  Temporary HD catheter placed on 8/20 and patient underwent HD.  Catheter switched to tunneled catheter on 8/21.  Patient with right sided hemiplegia from previous stroke.  Deconditioned and PT/OT consulted.  Initial recommendation for SNF.  Patient will need outpatient HD arrangements and SW consulted.  Patient started spiking fevers overnight 8/25.  Unremarkable CXR.  BCx and UA obtained. Blood culture from the 25th is positive and the Gram stain demonstrates "budding yeasts." ID is consulted for that. Micafungin started. Patient feels well. No complaints. No visual changes.     Interval " History: Seen briefly in HD. No complaints. Tolerating micafungin.     Review of Systems   Constitutional: Negative for chills and fever.   All other systems reviewed and are negative.    Objective:     Vital Signs (Most Recent):  Temp: 98.6 °F (37 °C) (08/28/20 0823)  Pulse: 81 (08/28/20 0930)  Resp: 18 (08/28/20 0823)  BP: 136/60 (08/28/20 0830)  SpO2: 98 % (08/28/20 0823) Vital Signs (24h Range):  Temp:  [98.4 °F (36.9 °C)-99.4 °F (37.4 °C)] 98.6 °F (37 °C)  Pulse:  [78-88] 81  Resp:  [16-18] 18  SpO2:  [97 %-98 %] 98 %  BP: (113-136)/(54-63) 136/60     Weight: 99 kg (218 lb 4.1 oz)  Body mass index is 33.19 kg/m².    Estimated Creatinine Clearance: 16.6 mL/min (A) (based on SCr of 5 mg/dL (H)).    Physical Exam  Nursing note reviewed.   Constitutional:       Appearance: Normal appearance. He is normal weight.   HENT:      Head: Normocephalic and atraumatic.   Cardiovascular:      Rate and Rhythm: Normal rate and regular rhythm.   Pulmonary:      Effort: No respiratory distress.      Breath sounds: Normal breath sounds. No wheezing.   Abdominal:      General: Abdomen is flat. Bowel sounds are normal.      Palpations: Abdomen is soft.   Musculoskeletal: Normal range of motion.   Skin:     General: Skin is warm.   Neurological:      General: No focal deficit present.      Mental Status: He is alert and oriented to person, place, and time.   Psychiatric:         Mood and Affect: Mood normal.         Behavior: Behavior normal.         Thought Content: Thought content normal.         Judgment: Judgment normal.         Significant Labs:   Blood Culture:   Recent Labs   Lab 08/25/20  0200   LABBLOO Gram stain kelsey bottle: budding yeast   08/26/2020  21:10    Results called to and read back by: JB BEAUCHAMP/[id number]  CANDIDA PARAPSILOSIS*     CBC:   Recent Labs   Lab 08/27/20  0409 08/28/20  0310   WBC 4.79 5.10   HGB 7.3* 7.3*   HCT 21.8* 22.0*   * 147*     CMP:   Recent Labs   Lab 08/27/20  0408  08/28/20  0310   * 136   K 4.0 4.1    102   CO2 26 24   * 140*   BUN 37* 47*   CREATININE 4.4* 5.0*   CALCIUM 6.7* 6.9*   PROT 4.8* 4.9*   ALBUMIN 1.8* 1.8*   BILITOT 0.1 0.1   ALKPHOS 73 73   AST 12 13   ALT 17 19   ANIONGAP 6* 10   EGFRNONAA 13* 11*       Significant Imaging: I have reviewed all pertinent imaging results/findings within the past 24 hours.

## 2020-08-28 NOTE — NURSING
Patient going to scheduled procedure as ordered (Interventional Radiology) for tunnel cath removal. Patient is AAOx4 without any discomfort.

## 2020-08-28 NOTE — PLAN OF CARE
Chart reviewed to address discharge needs and to update discharge plan; patient in with ESRD; was found to need HD which has been arranged; pt received THDC and developed temp; BC + yeast and was started on IV abx; plan for line holiday over the weekend then replace if BC clears; pt was also evaluated by therapy who rec: SNF; pt has been accepted for admission to [facility] via [facility]; 0 changes in previously given information    TN in to room to speak to patient to update discharge plan; out of room to HD: TN has been in contact with daughter, Navdeep Sorto updated via [facility] regarding delay in discharge d/t fever and requiring line holiday; informed of possible discharge on Monday    TN previously contacted [facility] [place] [place] to inform that patient will not discharge until Monday; treatment schedule changed to next Wednesday 9/2/20    Situation: lives home alone with support of girlfriend and daughter    Services: accepted for admissions to [facility]; HD arranged at [facility]    Equipment: pending PT/OT recs    IMM: will need on Sunday    Appts: will need PCP/Neph    Needs: TN to update SNF with final HD schedule    Dispo:Transfer to [facility] once medically stable with outpt HD @ [facility] [place] [place] [facility]       08/28/20 1513   Discharge Reassessment   Assessment Type Discharge Planning Reassessment   Provided patient/caregiver education on the expected discharge date and the discharge plan Yes   Do you have any problems affording any of your prescribed medications? No   Discharge Plan A Skilled Nursing Facility   Discharge Plan B Home Health   DME Needed Upon Discharge    (TBD)   Patient choice form signed by patient/caregiver Yes  ([facility] per daughter Maddy)   Anticipated Discharge Disposition SNF   Can the patient/caregiver answer the patient profile reliably? Yes, cognitively intact   How does the patient rate their overall health at the present time? Fair   Describe the patient's ability to walk at the present time. Walks with  the help of equipment   How often would a person be available to care for the patient? Often   Post-Acute Status   Post-Acute Authorization Placement;Dialysis   Post-Acute Placement Status Awaiting Internal Medical Clearance   Diaylsis Status Authorization Obtained   Discharge Delays (!) Change in Medical Condition

## 2020-08-28 NOTE — PT/OT/SLP PROGRESS
Physical Therapy      Patient Name:  Miguel Moore   MRN:  [id number]    Patient not seen today secondary to Dialysis , Second attempt , pt is off the unit to  IR . Will follow-up as able later hour/day .    Winter Rose, PTA

## 2020-08-28 NOTE — BRIEF OP NOTE
Radiology Post-Procedure Note    Pre Op Diagnosis: Candidemia in presence of in-dwelling THDC  Post Op Diagnosis: Same    Procedure: Fluoroscopic-guided removal of the 19.0-cm RIJV-approach THDC    Procedure performed by: Eran Ravi MD    Written Informed Consent Obtained: Yes  Specimen Removed: YES, catheter tip submitted for cultures  Estimated Blood Loss: Minimal    Findings:   Successful fluoroscopic-guided removal of the 19.0-cm RIJV-approach THDC with local anesthetic only. Patient tolerated the procedure well. No immediate post-procedural complications noted.     HOB elevated at least 30 degrees for 2 hours s/p removal of the RIJV-approach THDC; no lying flat to prevent/lessen oozing.     Thank you for considering IR for the care of your patient.     Eran Ravi MD  Interventional Radiology

## 2020-08-29 PROBLEM — B37.9 CANDIDA PARAPSILOSIS INFECTION: Status: ACTIVE | Noted: 2020-08-29

## 2020-08-29 LAB
ALBUMIN SERPL BCP-MCNC: 1.7 G/DL (ref 3.5–5.2)
ALP SERPL-CCNC: 76 U/L (ref 55–135)
ALT SERPL W/O P-5'-P-CCNC: 24 U/L (ref 10–44)
ANION GAP SERPL CALC-SCNC: 8 MMOL/L (ref 8–16)
AST SERPL-CCNC: 18 U/L (ref 10–40)
BASOPHILS # BLD AUTO: 0.02 K/UL (ref 0–0.2)
BASOPHILS NFR BLD: 0.4 % (ref 0–1.9)
BILIRUB SERPL-MCNC: 0.1 MG/DL (ref 0.1–1)
BUN SERPL-MCNC: 34 MG/DL (ref 8–23)
CALCIUM SERPL-MCNC: 7.6 MG/DL (ref 8.7–10.5)
CHLORIDE SERPL-SCNC: 104 MMOL/L (ref 95–110)
CO2 SERPL-SCNC: 24 MMOL/L (ref 23–29)
CREAT SERPL-MCNC: 3.7 MG/DL (ref 0.5–1.4)
DIFFERENTIAL METHOD: ABNORMAL
EOSINOPHIL # BLD AUTO: 0.2 K/UL (ref 0–0.5)
EOSINOPHIL NFR BLD: 3.1 % (ref 0–8)
ERYTHROCYTE [DISTWIDTH] IN BLOOD BY AUTOMATED COUNT: 13.2 % (ref 11.5–14.5)
EST. GFR  (AFRICAN AMERICAN): 19 ML/MIN/1.73 M^2
EST. GFR  (NON AFRICAN AMERICAN): 16 ML/MIN/1.73 M^2
GLUCOSE SERPL-MCNC: 155 MG/DL (ref 70–110)
HCT VFR BLD AUTO: 22.5 % (ref 40–54)
HGB BLD-MCNC: 7.3 G/DL (ref 14–18)
IMM GRANULOCYTES # BLD AUTO: 0.03 K/UL (ref 0–0.04)
IMM GRANULOCYTES NFR BLD AUTO: 0.5 % (ref 0–0.5)
LYMPHOCYTES # BLD AUTO: 1 K/UL (ref 1–4.8)
LYMPHOCYTES NFR BLD: 17.9 % (ref 18–48)
MAGNESIUM SERPL-MCNC: 2 MG/DL (ref 1.6–2.6)
MCH RBC QN AUTO: 24.7 PG (ref 27–31)
MCHC RBC AUTO-ENTMCNC: 32.4 G/DL (ref 32–36)
MCV RBC AUTO: 76 FL (ref 82–98)
MONOCYTES # BLD AUTO: 0.6 K/UL (ref 0.3–1)
MONOCYTES NFR BLD: 10.6 % (ref 4–15)
NEUTROPHILS # BLD AUTO: 3.7 K/UL (ref 1.8–7.7)
NEUTROPHILS NFR BLD: 67.5 % (ref 38–73)
NRBC BLD-RTO: 0 /100 WBC
PHOSPHATE SERPL-MCNC: 2.6 MG/DL (ref 2.7–4.5)
PLATELET # BLD AUTO: 156 K/UL (ref 150–350)
PMV BLD AUTO: 9.9 FL (ref 9.2–12.9)
POCT GLUCOSE: 127 MG/DL (ref 70–110)
POCT GLUCOSE: 165 MG/DL (ref 70–110)
POCT GLUCOSE: 174 MG/DL (ref 70–110)
POCT GLUCOSE: 219 MG/DL (ref 70–110)
POTASSIUM SERPL-SCNC: 4 MMOL/L (ref 3.5–5.1)
PROT SERPL-MCNC: 4.9 G/DL (ref 6–8.4)
RBC # BLD AUTO: 2.95 M/UL (ref 4.6–6.2)
SODIUM SERPL-SCNC: 136 MMOL/L (ref 136–145)
WBC # BLD AUTO: 5.46 K/UL (ref 3.9–12.7)

## 2020-08-29 PROCEDURE — 94761 N-INVAS EAR/PLS OXIMETRY MLT: CPT

## 2020-08-29 PROCEDURE — 97110 THERAPEUTIC EXERCISES: CPT | Mod: CQ

## 2020-08-29 PROCEDURE — 97530 THERAPEUTIC ACTIVITIES: CPT | Mod: CQ

## 2020-08-29 PROCEDURE — 25000003 PHARM REV CODE 250: Performed by: INTERNAL MEDICINE

## 2020-08-29 PROCEDURE — 63700000 PHARM REV CODE 250 ALT 637 W/O HCPCS: Performed by: INTERNAL MEDICINE

## 2020-08-29 PROCEDURE — 25000003 PHARM REV CODE 250: Performed by: HOSPITALIST

## 2020-08-29 PROCEDURE — 36415 COLL VENOUS BLD VENIPUNCTURE: CPT

## 2020-08-29 PROCEDURE — 21400001 HC TELEMETRY ROOM

## 2020-08-29 PROCEDURE — 84100 ASSAY OF PHOSPHORUS: CPT

## 2020-08-29 PROCEDURE — 80053 COMPREHEN METABOLIC PANEL: CPT

## 2020-08-29 PROCEDURE — 85025 COMPLETE CBC W/AUTO DIFF WBC: CPT

## 2020-08-29 PROCEDURE — 83735 ASSAY OF MAGNESIUM: CPT

## 2020-08-29 RX ORDER — FOLIC ACID 1 MG/1
1 TABLET ORAL DAILY
Status: DISCONTINUED | OUTPATIENT
Start: 2020-08-29 | End: 2020-09-01 | Stop reason: HOSPADM

## 2020-08-29 RX ORDER — AMOXICILLIN 250 MG
1 CAPSULE ORAL DAILY PRN
Status: DISCONTINUED | OUTPATIENT
Start: 2020-08-29 | End: 2020-09-01 | Stop reason: HOSPADM

## 2020-08-29 RX ORDER — FERROUS SULFATE 325(65) MG
325 TABLET, DELAYED RELEASE (ENTERIC COATED) ORAL DAILY
Status: DISCONTINUED | OUTPATIENT
Start: 2020-08-29 | End: 2020-08-30

## 2020-08-29 RX ADMIN — HYDRALAZINE HYDROCHLORIDE 25 MG: 25 TABLET, FILM COATED ORAL at 02:08

## 2020-08-29 RX ADMIN — INSULIN ASPART 2 UNITS: 100 INJECTION, SOLUTION INTRAVENOUS; SUBCUTANEOUS at 12:08

## 2020-08-29 RX ADMIN — CALCIUM ACETATE 667 MG: 667 CAPSULE ORAL at 05:08

## 2020-08-29 RX ADMIN — FERROUS SULFATE TAB EC 325 MG (65 MG FE EQUIVALENT) 325 MG: 325 (65 FE) TABLET DELAYED RESPONSE at 02:08

## 2020-08-29 RX ADMIN — CALCITRIOL CAPSULES 0.25 MCG 0.25 MCG: 0.25 CAPSULE ORAL at 09:08

## 2020-08-29 RX ADMIN — CLOPIDOGREL 75 MG: 75 TABLET, FILM COATED ORAL at 09:08

## 2020-08-29 RX ADMIN — LABETALOL HYDROCHLORIDE 100 MG: 100 TABLET, FILM COATED ORAL at 09:08

## 2020-08-29 RX ADMIN — CALCIUM ACETATE 667 MG: 667 CAPSULE ORAL at 09:08

## 2020-08-29 RX ADMIN — MAGNESIUM OXIDE TAB 400 MG (241.3 MG ELEMENTAL MG) 400 MG: 400 (241.3 MG) TAB at 09:08

## 2020-08-29 RX ADMIN — FOLIC ACID 1 MG: 1 TABLET ORAL at 02:08

## 2020-08-29 RX ADMIN — PHENYTOIN SODIUM 100 MG: 100 CAPSULE ORAL at 09:08

## 2020-08-29 RX ADMIN — FLUCONAZOLE 200 MG: 100 TABLET ORAL at 09:08

## 2020-08-29 RX ADMIN — DOCUSATE SODIUM 50 MG AND SENNOSIDES 8.6 MG 1 TABLET: 8.6; 5 TABLET, FILM COATED ORAL at 09:08

## 2020-08-29 RX ADMIN — ATORVASTATIN CALCIUM 80 MG: 40 TABLET, FILM COATED ORAL at 09:08

## 2020-08-29 RX ADMIN — TAMSULOSIN HYDROCHLORIDE 0.8 MG: 0.4 CAPSULE ORAL at 09:08

## 2020-08-29 RX ADMIN — HYDRALAZINE HYDROCHLORIDE 25 MG: 25 TABLET, FILM COATED ORAL at 05:08

## 2020-08-29 RX ADMIN — INSULIN ASPART 2 UNITS: 100 INJECTION, SOLUTION INTRAVENOUS; SUBCUTANEOUS at 09:08

## 2020-08-29 RX ADMIN — CALCIUM ACETATE 667 MG: 667 CAPSULE ORAL at 12:08

## 2020-08-29 RX ADMIN — HYDRALAZINE HYDROCHLORIDE 25 MG: 25 TABLET, FILM COATED ORAL at 09:08

## 2020-08-29 RX ADMIN — PHENYTOIN SODIUM 100 MG: 100 CAPSULE ORAL at 02:08

## 2020-08-29 RX ADMIN — FUROSEMIDE 80 MG: 40 TABLET ORAL at 09:08

## 2020-08-29 RX ADMIN — TRAZODONE HYDROCHLORIDE 50 MG: 50 TABLET ORAL at 09:08

## 2020-08-29 NOTE — PT/OT/SLP PROGRESS
Physical Therapy Treatment    Patient Name:  Miguel Moore   MRN:  [id number]    Recommendations:     Discharge Recommendations:  nursing facility, skilled   Discharge Equipment Recommendations: bedside commode   Barriers to discharge: decreased mobility, increased weakness, increased fall risk (pt is not as PLOF)    Assessment:     Miguel Moore is a 65 y.o. male admitted with a medical diagnosis of ESRD (end stage renal disease).  He presents with the following impairments/functional limitations:  weakness, impaired functional mobilty, decreased safety awareness, impaired coordination, impaired endurance, gait instability, decreased coordination, pain, impaired fine motor, impaired joint extensibility, impaired muscle length, impaired skin, impaired balance, impaired sensation, impaired self care skills, decreased lower extremity function, decreased ROM, edema, abnormal tone.    Pt required Dep A of 2 persons for rolling. Pt able to perform supine exercises to LLE and required PROM to RLE in supine. Total A of 2 people for donning/doffing of brief and pericare. There is expectation of returning to prior level of function and avoiding readmission.  Pt is at high risk of unplanned readmission, decline functional status and fall risk .   Pt was ambulating with quad cane prior to recently getting sick. Pt would benefit from further skilled PT services to address pt's deficits and assist with return to PLOF.    Rehab Prognosis: Fair; patient would benefit from acute skilled PT services to address these deficits and reach maximum level of function.    Recent Surgery: Procedure(s) (LRB):  EGD (ESOPHAGOGASTRODUODENOSCOPY) (N/A) 12 Days Post-Op    Plan:     During this hospitalization, patient to be seen 5 x/week to address the identified rehab impairments via therapeutic activities, therapeutic exercises, neuromuscular re-education and progress toward the following goals:    · Plan of Care Expires:  08/26/20(dialogue  "with supervising PT)    Subjective     Chief Complaint: had a BM  Patient/Family Comments/goals: " I need two people to help"  Pain/Comfort:  · Pain Rating 1: (yes)  · Location - Side 1: Bilateral  · Location 1: (legs)  · Pain Addressed 1: Pre-medicate for activity, Reposition, Distraction, Cessation of Activity      Objective:     Communicated with pt's nurse, Kaela, and Madison (charge nurse) prior to session.  Patient found HOB elevated with bed alarm, telemetry(HD cath) upon PT entry to room.     General Precautions: Standard, fall   Orthopedic Precautions:N/A   Braces: N/A     Functional Mobility:  · Bed Mobility:     · Rolling Left:  dependence and of 2 persons  · Rolling Right: dependence and of 2 persons   · Scooting: Dep A of 2 persons towards HOB with bed in trendelenburg      AM-PAC 6 CLICK MOBILITY  Turning over in bed (including adjusting bedclothes, sheets and blankets)?: 2  Sitting down on and standing up from a chair with arms (e.g., wheelchair, bedside commode, etc.): 2  Moving from lying on back to sitting on the side of the bed?: 2  Moving to and from a bed to a chair (including a wheelchair)?: 2  Need to walk in hospital room?: 2  Climbing 3-5 steps with a railing?: 1  Basic Mobility Total Score: 11       Therapeutic Activities and Exercises:   Supine therex to BLEs x20 reps PROM to RLE and AROM to LLE: Heel slides, hip abduction, hip adduction, SAQs, ankle pumps, LLE quad sets, glut sets  Required total A of 2 people  for donning/doffing brief and perineal cleaning.    Increased edema to BLEs and scrotum  region  · Pt educated on PTA role/POC.   · Importance of OOB activity with staff assistance.  · Importance of sitting up in the chair throughout the day as tolerated, especially for meals   · Safety during functional t/f and mobility with use of rehab staff and AD  · White board updated   · Multiple self-care tasks/functional mobility completed- assistance level noted above   · All " questions/concerns answered within  scope of practice      Patient left HOB elevated with BLEs elevated and B heels floating with all lines intact, call button in reach, bed alarm on and pt's nurse, Kaela, notified..    GOALS:   Multidisciplinary Problems     Physical Therapy Goals        Problem: Physical Therapy Goal    Goal Priority Disciplines Outcome Goal Variances Interventions   Physical Therapy Goal     PT, PT/OT Ongoing, Progressing     Description: Goals to be met by:     Patient will increase functional independence with mobility by performin. Supine to sit with Stand-by Assistance  2. Sit to supine with Stand-by Assistance  3. Sit to stand transfer with Stand-by Assistance using Quad Cane.  4. Bed to chair transfer with Minimal Assistance using Quad Cane and stand pivot transfer.  5. Bed to Wheelchair transfer with Stand-by Assistance and squat pivot transfer.  6. Gait x 50 feet with Minimal Assistance using Quad Cane.   7. Stand for 20 minutes with Stand-by Assistance using Quad Cane  8. Lower extremity exercise program x20 reps per handout, with assistance as needed                     Time Tracking:     PT Received On: 20  PT Start Time: 1406     PT Stop Time: 1437  PT Total Time (min): 31 min     Billable Minutes: Therapeutic Activity 15 and Therapeutic Exercise 16    Treatment Type: Treatment  PT/PTA: PTA     PTA Visit Number: 4     Ly Díaz, PTA  2020

## 2020-08-29 NOTE — PROGRESS NOTES
08/28/20 1200   During Hemodialysis Assessment   Blood Flow Rate (mL/min) 300 mL/min   Dialysate Flow Rate (mL/min) 600 ml/min   Ultrafiltration Rate (mL/Hr) 571 mL/Hr   Arteriovenous Lines Secure Yes   Arterial Pressure (mmHg) -210 mmHg   Venous Pressure (mmHg) 81   Blood Volume Processed (Liters) 0 L   UF Removed (mL) 1601 mL   TMP 22   Venous Line in Air Detector Yes   Intake (mL) 250 mL   Transducer Dry Yes   Access Visible Yes    notified of access issue? N/A   Heparin given? N/A   Intra-Hemodialysis Comments hd tx completed   Post-Hemodialysis Assessment   Rinseback Volume (mL) 250 mL   Blood Volume Processed (Liters) 63 L   Dialyzer Clearance Moderately streaked   Duration of Treatment (minutes) 210 minutes   Hemodialysis Intake (mL) 500 mL   Total UF (mL) 1601 mL   Net Fluid Removal 1101   Patient Response to Treatment tolerated   Post-Treatment Weight 97.9 kg (215 lb 13.3 oz)   Treatment Weight Change -1.1   Arterial bleeding stop time (min) 0 min   Venous bleeding stop time (min) 0 min   Post-Hemodialysis Comments blood reperfused and post hd tx line care done according to P&P, RTF with primary nurse as same via landline.

## 2020-08-29 NOTE — PROGRESS NOTES
[facility] Hospital Medicine  Progress Note    Patient Name: Miguel Moore  MRN: [id number]  Patient Class: IP- Inpatient   Admission Date: 8/15/2020  Length of Stay: 13 days  Attending Physician: Clement Wolfe MD  Primary Care Provider: Raciel Raymond MD        Subjective:     Principal Problem:ESRD (end stage renal disease)        HPI:  65 y.o. 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  States he was called on 8/13/2020, but did not come to the ED until today.  He reports mild edema and weight gain of 1-2 lb.  Denies fever, chills, cough, SOB, chest pain, palpitations, orthopnea, PND, dizziness, syncope, n/v/d, abd pain, or dysuria.  In the ED, Cr/BUN 5.3/64, K+ 5.4, H/H 7.5/22.7, CPK 1262, , UA shows proteinura 3+, renal US suggest chronic medical renal disease.  His Nephrologist is Dr. Roa, last visit Oct 2019.  Nephrology consult.  Placed in observation.    Overview/Hospital Course:  65 y.o. AAM with history significant for DMII, HTN, stroke, and CKD4 presents for evaluation of worsening CKD sent in by provider. Creatine = 2.0 1 year ago now with creatine 5.4 and potassium 5.8 worsening swelling and anasarca 2-3+ edema up to abdomen and flank, activity intolerance and orthopnea X 5 pillows. He is not on hemodialysis.  Amlodipine stopped.  Worsening BP and started on Hydralazine.  Nephrology consulted.  BP improved, but worsening Creat with persistent hyperkalemia.  Patient will need HD.  IR consulted for HD catheter.  Temporary HD catheter placed on 8/20 and patient underwent HD.  Catheter switched to tunneled catheter on 8/21.  Patient with right sided hemiplegia from previous stroke.  Deconditioned and PT/OT consulted.  Initial recommendation for SNF.  Patient will need outpatient HD  arrangements and SW consulted.  Patient started spiking fevers overnight 8/25.  Unremarkable CXR.  BCx and UA obtained.    Interval History: No acute events overnight.  In good spirits today but rather week.  Having bowel movement during my visit.  Denies cp and sob.  Remains afebrile.      Review of Systems   Constitutional: Positive for activity change. Negative for appetite change and fever.   HENT: Negative for ear discharge and ear pain.    Eyes: Negative for discharge and itching.   Respiratory: Negative for cough and chest tightness.    Cardiovascular: Positive for leg swelling. Negative for chest pain.   Gastrointestinal: Negative for abdominal distention and abdominal pain.   Endocrine: Negative for cold intolerance and heat intolerance.   Genitourinary: Negative for difficulty urinating and dysuria.   Musculoskeletal: Negative for arthralgias and back pain.   Neurological: Positive for weakness. Negative for seizures and syncope.   Psychiatric/Behavioral: Negative for agitation, confusion and dysphoric mood.     Objective:     Vital Signs (Most Recent):  Temp: 98.8 °F (37.1 °C) (08/29/20 1110)  Pulse: 94 (08/29/20 1110)  Resp: 16 (08/29/20 1110)  BP: (!) 187/80 (08/29/20 1110)  SpO2: 100 % (08/29/20 1110) Vital Signs (24h Range):  Temp:  [97.8 °F (36.6 °C)-98.8 °F (37.1 °C)] 98.8 °F (37.1 °C)  Pulse:  [74-98] 94  Resp:  [16-18] 16  SpO2:  [98 %-100 %] 100 %  BP: (126-187)/(54-80) 187/80     Weight: 99 kg (218 lb 4.1 oz)  Body mass index is 33.19 kg/m².    Intake/Output Summary (Last 24 hours) at 8/29/2020 1227  Last data filed at 8/28/2020 1400  Gross per 24 hour   Intake 240 ml   Output --   Net 240 ml      Physical Exam  Vitals signs and nursing note reviewed.   Constitutional:       General: He is not in acute distress.     Appearance: He is well-developed.   HENT:      Head: Normocephalic and atraumatic.      Right Ear: External ear normal.      Left Ear: External ear normal.      Nose: Nose normal.    Eyes:      Conjunctiva/sclera: Conjunctivae normal.      Pupils: Pupils are equal, round, and reactive to light.   Neck:      Musculoskeletal: Normal range of motion and neck supple.   Cardiovascular:      Rate and Rhythm: Normal rate and regular rhythm.   Pulmonary:      Effort: Pulmonary effort is normal. No respiratory distress.      Breath sounds: No wheezing.   Abdominal:      General: Bowel sounds are normal. There is no distension.      Palpations: Abdomen is soft.      Tenderness: There is no abdominal tenderness.      Comments: No palpable hepatomegaly or splenomegaly   Musculoskeletal:         General: Swelling present. No tenderness.      Comments: Deep edema up to both thighs   Skin:     General: Skin is warm and dry.   Neurological:      Mental Status: He is alert and oriented to person, place, and time.      Comments: Right sided hemiplegia   Psychiatric:         Thought Content: Thought content normal.         Significant Labs: All pertinent labs within the past 24 hours have been reviewed.    Significant Imaging: I have reviewed and interpreted all pertinent imaging results/findings within the past 24 hours.      Assessment/Plan:      * ESRD (end stage renal disease)  -Admitted to inpatient status  -On admit had anasarca up to abdomen.    -Previously had CKD4 with baseline Cr of 3 but was 5.8 on admit.    -Noted nephrotic proteinuria  -Continue strict ins/outs and daily weights  -Dr Menard on board and input appreciated  -Continue diuresis with lasix  -Continue dialysis per nephrology  -Outpatient HD has been arranged  -Had planned discharge 8/27 if remained afebrile and cultures negative.  Unfortunately his blood culture grew Candida parapsilosis  -Dr. Gomez with ID consulted and case discussed with him.  Started micafungin 8/27 and now on fluconazole.  -Dialysis line removed after HD 8/28 and plan line holiday over weekend.  -Plan new line by IR on Monday.    Anasarca  -Treatment as  above    Nephrotic syndrome  -Treatment as above    Candida parapsilosis infection  -Found to have candidemia - source unclear  -Started on micafungin and now converted to fluconazole  -Repeat cultures negative so far  -Dialysis line removed 8/28.  Plan to replace line Monday  -Appreciate input from Dr. Gomez.    Hypocalcemia  -Corrected calcium is 8.9  -Continue calcitrol 0.25 mcg and calcium acetate 667 po tid AC    Anemia  -Anemia of CKD  -No evidence of bleeding.  -Hb stable above transfusion threshold x 3 days  -He is folate deficient.  Start replacement  -Borderline iron deficient.  Will start replacement  -Repeat cbc in AM        Enlarged prostate  -Continue home flomax - voiding well - strict I&Os      Hemiplegia and hemiparesis following cerebral infarction affecting right dominant side  -Stable, no acute issue, continue plavix/statin.  -PT/OT evaluation.  -Anticipate discharge to SNF when medically stable    Seizure disorder as sequela of cerebrovascular accident  -Continue home regimen of phenytoin  -No seizures.      Benign non-nodular prostatic hyperplasia without lower urinary tract symptoms  -Continue home tamsulosin     Controlled type 2 diabetes mellitus with stage 4 chronic kidney disease, with long-term current use of insulin  -HgbA1c 5.8  -Hold oral antihyperglycemics while inpatient  -PRN sliding scale insulin  -ACHS glucose monitoring   -ADA diet     Pure hypercholesterolemia  -Continue statin    Benign essential HTN; ACEI hyperK  -Generally well managed but higher this morning  -Norvasc stopped due to lower extremity edema.  -Continue hydrlazine and labetalol  -If elevations persist consider nifedipine.      VTE Risk Mitigation (From admission, onward)         Ordered     IP VTE HIGH RISK PATIENT  Once      08/15/20 1505     Place sequential compression device  Until discontinued      08/15/20 1505                Discharge Planning   GARY:      Code Status: Full Code   Is the patient medically  ready for discharge?:     Reason for patient still in hospital (select all that apply): Treatment  Discharge Plan A: Skilled Nursing Facility   Discharge Delays: (!) Change in Medical Condition              Clement Wolfe MD  Department of Hospital Medicine   [facility]

## 2020-08-29 NOTE — PLAN OF CARE
08/29/20 1506   Medicare Message   Important Message from Medicare regarding Discharge Appeal Rights Given to patient/caregiver;Explained to patient/caregiver;Signed/date by patient/caregiver;Other (comments)   Date IMM was signed 08/29/20   Time IMM was signed 1506   Patient verbally expressed understanding of IMM Notice; signed but was unable to continue with date and time.

## 2020-08-29 NOTE — ASSESSMENT & PLAN NOTE
-Anemia of CKD  -No evidence of bleeding.  -Hb stable above transfusion threshold x 3 days  -He is folate deficient.  Start replacement  -Borderline iron deficient.  Will start replacement  -Repeat cbc in AM

## 2020-08-29 NOTE — ASSESSMENT & PLAN NOTE
-Admitted to inpatient status  -On admit had anasarca up to abdomen.    -Previously had CKD4 with baseline Cr of 3 but was 5.8 on admit.    -Noted nephrotic proteinuria  -Continue strict ins/outs and daily weights  -Dr Menard on board and input appreciated  -Continue diuresis with lasix  -Continue dialysis per nephrology  -Outpatient HD has been arranged  -Had planned discharge 8/27 if remained afebrile and cultures negative.  Unfortunately his blood culture grew Candida parapsilosis  -Dr. Gomez with ID consulted and case discussed with him.  Started micafungin 8/27 and now on fluconazole.  -Dialysis line removed after HD 8/28 and plan line holiday over weekend.  -Plan new line by IR on Monday.

## 2020-08-29 NOTE — ASSESSMENT & PLAN NOTE
-Found to have candidemia - source unclear  -Started on micafungin and now converted to fluconazole  -Repeat cultures negative so far  -Dialysis line removed 8/28.  Plan to replace line Monday  -Appreciate input from Dr. Gomez.

## 2020-08-29 NOTE — ASSESSMENT & PLAN NOTE
-Generally well managed but higher this morning  -Norvasc stopped due to lower extremity edema.  -Continue hydrlazine and labetalol  -If elevations persist consider nifedipine.

## 2020-08-29 NOTE — SUBJECTIVE & OBJECTIVE
Interval History: No acute events overnight.  In good spirits today but rather week.  Having bowel movement during my visit.  Denies cp and sob.  Remains afebrile.      Review of Systems   Constitutional: Positive for activity change. Negative for appetite change and fever.   HENT: Negative for ear discharge and ear pain.    Eyes: Negative for discharge and itching.   Respiratory: Negative for cough and chest tightness.    Cardiovascular: Positive for leg swelling. Negative for chest pain.   Gastrointestinal: Negative for abdominal distention and abdominal pain.   Endocrine: Negative for cold intolerance and heat intolerance.   Genitourinary: Negative for difficulty urinating and dysuria.   Musculoskeletal: Negative for arthralgias and back pain.   Neurological: Positive for weakness. Negative for seizures and syncope.   Psychiatric/Behavioral: Negative for agitation, confusion and dysphoric mood.     Objective:     Vital Signs (Most Recent):  Temp: 98.8 °F (37.1 °C) (08/29/20 1110)  Pulse: 94 (08/29/20 1110)  Resp: 16 (08/29/20 1110)  BP: (!) 187/80 (08/29/20 1110)  SpO2: 100 % (08/29/20 1110) Vital Signs (24h Range):  Temp:  [97.8 °F (36.6 °C)-98.8 °F (37.1 °C)] 98.8 °F (37.1 °C)  Pulse:  [74-98] 94  Resp:  [16-18] 16  SpO2:  [98 %-100 %] 100 %  BP: (126-187)/(54-80) 187/80     Weight: 99 kg (218 lb 4.1 oz)  Body mass index is 33.19 kg/m².    Intake/Output Summary (Last 24 hours) at 8/29/2020 1227  Last data filed at 8/28/2020 1400  Gross per 24 hour   Intake 240 ml   Output --   Net 240 ml      Physical Exam  Vitals signs and nursing note reviewed.   Constitutional:       General: He is not in acute distress.     Appearance: He is well-developed.   HENT:      Head: Normocephalic and atraumatic.      Right Ear: External ear normal.      Left Ear: External ear normal.      Nose: Nose normal.   Eyes:      Conjunctiva/sclera: Conjunctivae normal.      Pupils: Pupils are equal, round, and reactive to light.   Neck:       Musculoskeletal: Normal range of motion and neck supple.   Cardiovascular:      Rate and Rhythm: Normal rate and regular rhythm.   Pulmonary:      Effort: Pulmonary effort is normal. No respiratory distress.      Breath sounds: No wheezing.   Abdominal:      General: Bowel sounds are normal. There is no distension.      Palpations: Abdomen is soft.      Tenderness: There is no abdominal tenderness.      Comments: No palpable hepatomegaly or splenomegaly   Musculoskeletal:         General: Swelling present. No tenderness.      Comments: Deep edema up to both thighs   Skin:     General: Skin is warm and dry.   Neurological:      Mental Status: He is alert and oriented to person, place, and time.      Comments: Right sided hemiplegia   Psychiatric:         Thought Content: Thought content normal.         Significant Labs: All pertinent labs within the past 24 hours have been reviewed.    Significant Imaging: I have reviewed and interpreted all pertinent imaging results/findings within the past 24 hours.

## 2020-08-29 NOTE — NURSING
Report received from DARRION Pacheco. Visualized patient and assessed patient's overall condition and appearance. No acute distress noted. Will continue to monitor

## 2020-08-29 NOTE — PLAN OF CARE
Problem: Physical Therapy Goal  Goal: Physical Therapy Goal  Description: Goals to be met by:     Patient will increase functional independence with mobility by performin. Supine to sit with Stand-by Assistance  2. Sit to supine with Stand-by Assistance  3. Sit to stand transfer with Stand-by Assistance using Quad Cane.  4. Bed to chair transfer with Minimal Assistance using Quad Cane and stand pivot transfer.  5. Bed to Wheelchair transfer with Stand-by Assistance and squat pivot transfer.  6. Gait x 50 feet with Minimal Assistance using Quad Cane.   7. Stand for 20 minutes with Stand-by Assistance using Quad Cane  8. Lower extremity exercise program x20 reps per handout, with assistance as needed    Outcome: Ongoing, Progressing   Pt required Dep A of 2 persons for rolling. Pt able to perform supine exercises to LLE and required PROM to RLE in supine. Total A of 2 people for donning/doffing of brief and pericare. There is expectation of returning to prior level of function and avoiding readmission.  Pt is at high risk of unplanned readmission, decline functional status and fall risk .   Pt was ambulating with quad cane prior to recently getting sick. Pt would benefit from further skilled PT services to address pt's deficits and assist with return to PLOF

## 2020-08-30 LAB
ALBUMIN SERPL BCP-MCNC: 1.8 G/DL (ref 3.5–5.2)
ALP SERPL-CCNC: 76 U/L (ref 55–135)
ALT SERPL W/O P-5'-P-CCNC: 23 U/L (ref 10–44)
ANION GAP SERPL CALC-SCNC: 11 MMOL/L (ref 8–16)
AST SERPL-CCNC: 16 U/L (ref 10–40)
BASOPHILS # BLD AUTO: 0.03 K/UL (ref 0–0.2)
BASOPHILS NFR BLD: 0.5 % (ref 0–1.9)
BILIRUB SERPL-MCNC: 0.2 MG/DL (ref 0.1–1)
BUN SERPL-MCNC: 40 MG/DL (ref 8–23)
CALCIUM SERPL-MCNC: 7.6 MG/DL (ref 8.7–10.5)
CHLORIDE SERPL-SCNC: 102 MMOL/L (ref 95–110)
CO2 SERPL-SCNC: 23 MMOL/L (ref 23–29)
CREAT SERPL-MCNC: 4.4 MG/DL (ref 0.5–1.4)
DIFFERENTIAL METHOD: ABNORMAL
EOSINOPHIL # BLD AUTO: 0.2 K/UL (ref 0–0.5)
EOSINOPHIL NFR BLD: 3.2 % (ref 0–8)
ERYTHROCYTE [DISTWIDTH] IN BLOOD BY AUTOMATED COUNT: 13.4 % (ref 11.5–14.5)
EST. GFR  (AFRICAN AMERICAN): 15 ML/MIN/1.73 M^2
EST. GFR  (NON AFRICAN AMERICAN): 13 ML/MIN/1.73 M^2
GLUCOSE SERPL-MCNC: 142 MG/DL (ref 70–110)
HCT VFR BLD AUTO: 23.3 % (ref 40–54)
HGB BLD-MCNC: 7.5 G/DL (ref 14–18)
IMM GRANULOCYTES # BLD AUTO: 0.03 K/UL (ref 0–0.04)
IMM GRANULOCYTES NFR BLD AUTO: 0.5 % (ref 0–0.5)
LYMPHOCYTES # BLD AUTO: 0.9 K/UL (ref 1–4.8)
LYMPHOCYTES NFR BLD: 14.2 % (ref 18–48)
MAGNESIUM SERPL-MCNC: 1.9 MG/DL (ref 1.6–2.6)
MCH RBC QN AUTO: 25 PG (ref 27–31)
MCHC RBC AUTO-ENTMCNC: 32.2 G/DL (ref 32–36)
MCV RBC AUTO: 78 FL (ref 82–98)
MONOCYTES # BLD AUTO: 0.6 K/UL (ref 0.3–1)
MONOCYTES NFR BLD: 9.4 % (ref 4–15)
NEUTROPHILS # BLD AUTO: 4.8 K/UL (ref 1.8–7.7)
NEUTROPHILS NFR BLD: 72.2 % (ref 38–73)
NRBC BLD-RTO: 0 /100 WBC
PHOSPHATE SERPL-MCNC: 2.4 MG/DL (ref 2.7–4.5)
PLATELET # BLD AUTO: 185 K/UL (ref 150–350)
PMV BLD AUTO: 9.7 FL (ref 9.2–12.9)
POCT GLUCOSE: 155 MG/DL (ref 70–110)
POCT GLUCOSE: 181 MG/DL (ref 70–110)
POCT GLUCOSE: 191 MG/DL (ref 70–110)
POCT GLUCOSE: 198 MG/DL (ref 70–110)
POTASSIUM SERPL-SCNC: 3.8 MMOL/L (ref 3.5–5.1)
PROT SERPL-MCNC: 5.1 G/DL (ref 6–8.4)
RBC # BLD AUTO: 3 M/UL (ref 4.6–6.2)
SODIUM SERPL-SCNC: 136 MMOL/L (ref 136–145)
WBC # BLD AUTO: 6.63 K/UL (ref 3.9–12.7)

## 2020-08-30 PROCEDURE — 63600175 PHARM REV CODE 636 W HCPCS: Performed by: INTERNAL MEDICINE

## 2020-08-30 PROCEDURE — 25000003 PHARM REV CODE 250: Performed by: INTERNAL MEDICINE

## 2020-08-30 PROCEDURE — 25000003 PHARM REV CODE 250: Performed by: HOSPITALIST

## 2020-08-30 PROCEDURE — 21400001 HC TELEMETRY ROOM

## 2020-08-30 PROCEDURE — 83735 ASSAY OF MAGNESIUM: CPT

## 2020-08-30 PROCEDURE — 36415 COLL VENOUS BLD VENIPUNCTURE: CPT

## 2020-08-30 PROCEDURE — 94761 N-INVAS EAR/PLS OXIMETRY MLT: CPT

## 2020-08-30 PROCEDURE — 85025 COMPLETE CBC W/AUTO DIFF WBC: CPT

## 2020-08-30 PROCEDURE — 80053 COMPREHEN METABOLIC PANEL: CPT

## 2020-08-30 PROCEDURE — 63700000 PHARM REV CODE 250 ALT 637 W/O HCPCS: Performed by: INTERNAL MEDICINE

## 2020-08-30 PROCEDURE — 84100 ASSAY OF PHOSPHORUS: CPT

## 2020-08-30 RX ORDER — FERROUS GLUCONATE 324(37.5)
324 TABLET ORAL 2 TIMES DAILY WITH MEALS
Status: DISCONTINUED | OUTPATIENT
Start: 2020-08-30 | End: 2020-09-01 | Stop reason: HOSPADM

## 2020-08-30 RX ORDER — FUROSEMIDE 10 MG/ML
80 INJECTION INTRAMUSCULAR; INTRAVENOUS ONCE
Status: COMPLETED | OUTPATIENT
Start: 2020-08-30 | End: 2020-08-30

## 2020-08-30 RX ADMIN — MAGNESIUM OXIDE TAB 400 MG (241.3 MG ELEMENTAL MG) 400 MG: 400 (241.3 MG) TAB at 09:08

## 2020-08-30 RX ADMIN — INSULIN ASPART 2 UNITS: 100 INJECTION, SOLUTION INTRAVENOUS; SUBCUTANEOUS at 05:08

## 2020-08-30 RX ADMIN — FUROSEMIDE 80 MG: 40 TABLET ORAL at 09:08

## 2020-08-30 RX ADMIN — LABETALOL HYDROCHLORIDE 100 MG: 100 TABLET, FILM COATED ORAL at 09:08

## 2020-08-30 RX ADMIN — DOCUSATE SODIUM 50 MG AND SENNOSIDES 8.6 MG 1 TABLET: 8.6; 5 TABLET, FILM COATED ORAL at 09:08

## 2020-08-30 RX ADMIN — INSULIN ASPART 1 UNITS: 100 INJECTION, SOLUTION INTRAVENOUS; SUBCUTANEOUS at 10:08

## 2020-08-30 RX ADMIN — FERROUS SULFATE TAB EC 325 MG (65 MG FE EQUIVALENT) 325 MG: 325 (65 FE) TABLET DELAYED RESPONSE at 09:08

## 2020-08-30 RX ADMIN — HYDRALAZINE HYDROCHLORIDE 25 MG: 25 TABLET, FILM COATED ORAL at 02:08

## 2020-08-30 RX ADMIN — ATORVASTATIN CALCIUM 80 MG: 40 TABLET, FILM COATED ORAL at 09:08

## 2020-08-30 RX ADMIN — HYDRALAZINE HYDROCHLORIDE 25 MG: 25 TABLET, FILM COATED ORAL at 11:08

## 2020-08-30 RX ADMIN — FUROSEMIDE 80 MG: 10 INJECTION, SOLUTION INTRAVENOUS at 03:08

## 2020-08-30 RX ADMIN — CALCIUM ACETATE 667 MG: 667 CAPSULE ORAL at 11:08

## 2020-08-30 RX ADMIN — HYDRALAZINE HYDROCHLORIDE 25 MG: 25 TABLET, FILM COATED ORAL at 05:08

## 2020-08-30 RX ADMIN — PHENYTOIN SODIUM 100 MG: 100 CAPSULE ORAL at 09:08

## 2020-08-30 RX ADMIN — HYDRALAZINE HYDROCHLORIDE 10 MG: 20 INJECTION INTRAMUSCULAR; INTRAVENOUS at 07:08

## 2020-08-30 RX ADMIN — FERROUS GLUCONATE TAB 324 MG (37.5 MG ELEMENTAL IRON) 324 MG: 324 (37.5 FE) TAB at 05:08

## 2020-08-30 RX ADMIN — TAMSULOSIN HYDROCHLORIDE 0.8 MG: 0.4 CAPSULE ORAL at 09:08

## 2020-08-30 RX ADMIN — FOLIC ACID 1 MG: 1 TABLET ORAL at 09:08

## 2020-08-30 RX ADMIN — CALCIUM ACETATE 667 MG: 667 CAPSULE ORAL at 09:08

## 2020-08-30 RX ADMIN — CLOPIDOGREL 75 MG: 75 TABLET, FILM COATED ORAL at 09:08

## 2020-08-30 RX ADMIN — TRAZODONE HYDROCHLORIDE 50 MG: 50 TABLET ORAL at 09:08

## 2020-08-30 RX ADMIN — CALCITRIOL CAPSULES 0.25 MCG 0.25 MCG: 0.25 CAPSULE ORAL at 09:08

## 2020-08-30 RX ADMIN — PHENYTOIN SODIUM 100 MG: 100 CAPSULE ORAL at 02:08

## 2020-08-30 RX ADMIN — FLUCONAZOLE 200 MG: 100 TABLET ORAL at 09:08

## 2020-08-30 NOTE — PLAN OF CARE
Problem: Fall Injury Risk  Goal: Absence of Fall and Fall-Related Injury  Outcome: Ongoing, Progressing  Intervention: Identify and Manage Contributors to Fall Injury Risk  Flowsheets (Taken 8/30/2020 0459)  Self-Care Promotion:   independence encouraged   BADL personal objects within reach  Medication Review/Management: medications reviewed  Intervention: Promote Injury-Free Environment  Flowsheets (Taken 8/30/2020 0459)  Safety Promotion/Fall Prevention:   assistive device/personal item within reach   bed alarm set   side rails raised x 2   medications reviewed  Environmental Safety Modification:   assistive device/personal items within reach   clutter free environment maintained     Problem: Adult Inpatient Plan of Care  Goal: Plan of Care Review  Outcome: Ongoing, Progressing     Problem: Diabetes Comorbidity  Goal: Blood Glucose Level Within Desired Range  Outcome: Ongoing, Progressing  Intervention: Maintain Glycemic Control  Flowsheets (Taken 8/30/2020 0459)  Glycemic Management:   blood glucose monitoring   supplemental insulin given     Problem: Skin Injury Risk Increased  Goal: Skin Health and Integrity  Outcome: Ongoing, Progressing  Intervention: Optimize Skin Protection  Flowsheets (Taken 8/30/2020 0459)  Pressure Reduction Techniques:   frequent weight shift encouraged   heels elevated off bed   weight shift assistance provided  Pressure Reduction Devices: positioning supports utilized  Skin Protection: incontinence pads utilized  Head of Bed (HOB): HOB elevated     Problem: Fluid Volume Excess  Goal: Fluid Balance  Outcome: Ongoing, Progressing  Intervention: Monitor and Manage Hypervolemia  Flowsheets (Taken 8/30/2020 0459)  Skin Protection: incontinence pads utilized

## 2020-08-30 NOTE — PLAN OF CARE
Problem: Fall Injury Risk  Goal: Absence of Fall and Fall-Related Injury  Outcome: Ongoing, Progressing     Problem: Adult Inpatient Plan of Care  Goal: Plan of Care Review  Outcome: Ongoing, Progressing     Problem: Diabetes Comorbidity  Goal: Blood Glucose Level Within Desired Range  Outcome: Ongoing, Progressing     Problem: Infection  Goal: Infection Symptom Resolution  Outcome: Ongoing, Progressing     Problem: Infection (Hemodialysis)  Goal: Absence of Infection Signs/Symptoms  Outcome: Ongoing, Progressing    Pt sitting up in the bed with finance at the bedside. BM X 1. Right arm continues to  be swollen. Elevated on pillow; no pain or discomfort to PIV in (R) arm. Consult for IR on 08/31/20 for dialysis port insertion. LE with 3+ edema elevated on pillows with heels floating off. Pt free of falls and injury throughout the shift. Pt's head and face shaven, hot towel applied with moisturizer. Plan of care updated and reviewed with pt. Pt verbalized understanding. Bed lowered and lock with call light within reach. Will continue to be monitored.

## 2020-08-30 NOTE — NURSING
Report received from DARRION Faria. Visualized patient and assessed patient's overall condition and appearance. No acute distress noted. Will continue to monitor

## 2020-08-30 NOTE — ASSESSMENT & PLAN NOTE
-Anemia of CKD  -No evidence of bleeding.  -Hb stable above transfusion threshold x 3 days  -He is folate deficient.  Started replacement 8/29  -Borderline iron deficient.  Started replacement 8/29  -Repeat cbc in AM

## 2020-08-30 NOTE — PROGRESS NOTES
Date of Admission:8/15/2020    SUBJECTIVE: notes feeling ok  Notes edema of rt hand    Current Facility-Administered Medications   Medication    acetaminophen tablet 650 mg    atorvastatin tablet 80 mg    calcitRIOL capsule 0.25 mcg    calcium acetate(phosphat bind) capsule 667 mg    clopidogreL tablet 75 mg    dextrose 50% injection 12.5 g    dextrose 50% injection 25 g    diazePAM tablet 2 mg    epoetin marisol-epbx injection 10,000 Units    ferrous gluconate tablet 324 mg    fluconazole tablet 200 mg    folic acid tablet 1 mg    furosemide injection 80 mg    glucagon (human recombinant) injection 1 mg    glucose chewable tablet 16 g    glucose chewable tablet 24 g    hydrALAZINE injection 10 mg    hydrALAZINE tablet 25 mg    insulin aspart U-100 pen 1-10 Units    labetaloL tablet 100 mg    lactulose 20 gram/30 mL solution Soln 30 g    magnesium oxide tablet 400 mg    oxyCODONE-acetaminophen 5-325 mg per tablet 1 tablet    phenytoin (DILANTIN) ER capsule 100 mg    senna-docusate 8.6-50 mg per tablet 1 tablet    sodium chloride 0.9% flush 10 mL    sodium chloride 0.9% flush 10 mL    tamsulosin 24 hr capsule 0.8 mg    traZODone tablet 50 mg       Wt Readings from Last 3 Encounters:   08/30/20 97.9 kg (215 lb 13.3 oz)   08/13/20 97.1 kg (214 lb 2.8 oz)   07/31/20 98.7 kg (217 lb 9.5 oz)     Temp Readings from Last 3 Encounters:   08/30/20 98.5 °F (36.9 °C) (Oral)   08/13/20 98.4 °F (36.9 °C)   07/31/20 97.7 °F (36.5 °C) (Temporal)     BP Readings from Last 3 Encounters:   08/30/20 (!) 150/65   08/13/20 (!) 160/80   07/31/20 132/78     Pulse Readings from Last 3 Encounters:   08/30/20 79   08/13/20 100   07/31/20 104       Intake/Output Summary (Last 24 hours) at 8/30/2020 1508  Last data filed at 8/30/2020 0500  Gross per 24 hour   Intake 360 ml   Output --   Net 360 ml       PE:  GEN:wd male in nad  HEENT:ncat,eomi,mm  CVS:s1s2 regular  PULM:ctab  ABD:+bs,soft,nd  EXT:rt arm edema, 2+leg  edema   NEURO:awake    Recent Labs   Lab 08/30/20  0453   *      K 3.8      CO2 23   BUN 40*   CREATININE 4.4*   CALCIUM 7.6*   MG 1.9       Lab Results   Component Value Date    .7 (H) 08/16/2020    CALCIUM 7.6 (L) 08/30/2020    PHOS 2.4 (L) 08/30/2020       Recent Labs   Lab 08/30/20  0453   WBC 6.63   RBC 3.00*   HGB 7.5*   HCT 23.3*      MCV 78*   MCH 25.0*   MCHC 32.2           A/P:  1.wan. on ckd. Very overloaded. Try iv lasix today. pc for am.  Creatinine still trending up.  2.anemia. Increase oral iron.  3.hyperphospatemia. Phos better. Dc binders.  4.candida bacteremia. Cont difucan renal dose. Needs eye exams.  5.2nd hyperpara. Following ca/phos.  6.htn. no arb for now. bp  Up some. Try some mor diuresis.  7.sever maln. Needs to eat mor.

## 2020-08-30 NOTE — ASSESSMENT & PLAN NOTE
-Admitted to inpatient status  -On admit had anasarca up to abdomen including significant penile and scrotal edema.    -Previously had CKD4 with baseline Cr of 3 but was 5.8 on admit.    -Noted nephrotic proteinuria  -Continue strict ins/outs and daily weights  -Dr Menard and Dr. Tamez on board and input appreciated  -Continue diuresis with lasix  -Continue dialysis per nephrology  -Outpatient HD has been arranged  -Had planned discharge 8/27 if remained afebrile and cultures negative.  Unfortunately his blood culture grew Candida parapsilosis  -Dr. Gomez with ID consulted and case discussed with him.  Started micafungin 8/27 and now on fluconazole.  -Dialysis line removed after HD 8/28 and plan line holiday over weekend.  -Plan new line by IR on Monday.

## 2020-08-30 NOTE — NURSING
Bedside Report given to DARRION Faria. Walking rounds completed. Visualized and assessed patient NAD noted. Safety precautions maintained and call light within reach.     Chart check completed.

## 2020-08-30 NOTE — SUBJECTIVE & OBJECTIVE
Interval History: No acute events overnight.  In good spirits today.  No complaints other than ongoing scrotal and penile edema which are present since admit.  Denies cp and sob.  Remains afebrile.      Review of Systems   Constitutional: Positive for activity change. Negative for appetite change and fever.   HENT: Negative for ear discharge and ear pain.    Eyes: Negative for discharge and itching.   Respiratory: Negative for cough and chest tightness.    Cardiovascular: Positive for leg swelling. Negative for chest pain.   Gastrointestinal: Negative for abdominal distention and abdominal pain.   Endocrine: Negative for cold intolerance and heat intolerance.   Genitourinary: Positive for penile swelling and scrotal swelling. Negative for difficulty urinating and dysuria.   Musculoskeletal: Negative for arthralgias and back pain.   Neurological: Positive for weakness. Negative for seizures and syncope.   Psychiatric/Behavioral: Negative for agitation, confusion and dysphoric mood.     Objective:     Vital Signs (Most Recent):  Temp: 98.5 °F (36.9 °C) (08/30/20 1122)  Pulse: 79 (08/30/20 1122)  Resp: 18 (08/30/20 1122)  BP: (!) 150/65 (08/30/20 1122)  SpO2: 99 % (08/30/20 1122) Vital Signs (24h Range):  Temp:  [97.5 °F (36.4 °C)-98.9 °F (37.2 °C)] 98.5 °F (36.9 °C)  Pulse:  [73-82] 79  Resp:  [16-18] 18  SpO2:  [98 %-99 %] 99 %  BP: (120-150)/(60-75) 150/65     Weight: 97.9 kg (215 lb 13.3 oz)  Body mass index is 32.82 kg/m².    Intake/Output Summary (Last 24 hours) at 8/30/2020 1419  Last data filed at 8/30/2020 0500  Gross per 24 hour   Intake 360 ml   Output --   Net 360 ml      Physical Exam  Vitals signs and nursing note reviewed.   Constitutional:       General: He is not in acute distress.     Appearance: He is well-developed.   HENT:      Head: Normocephalic and atraumatic.      Right Ear: External ear normal.      Left Ear: External ear normal.      Nose: Nose normal.   Eyes:      Conjunctiva/sclera:  Conjunctivae normal.      Pupils: Pupils are equal, round, and reactive to light.   Neck:      Musculoskeletal: Normal range of motion and neck supple.   Cardiovascular:      Rate and Rhythm: Normal rate and regular rhythm.   Pulmonary:      Effort: Pulmonary effort is normal. No respiratory distress.      Breath sounds: No wheezing.   Abdominal:      General: Bowel sounds are normal. There is no distension.      Palpations: Abdomen is soft.      Tenderness: There is no abdominal tenderness.      Comments: No palpable hepatomegaly or splenomegaly   Genitourinary:     Comments: Penis and scrotum quite swollen  Musculoskeletal:         General: Swelling present. No tenderness.      Comments: Deep edema up to both thighs but now starting to see wrinkling in skin of legs - indicating improvement.   Skin:     General: Skin is warm and dry.   Neurological:      Mental Status: He is alert and oriented to person, place, and time.      Comments: Right sided hemiplegia   Psychiatric:         Thought Content: Thought content normal.         Significant Labs: All pertinent labs within the past 24 hours have been reviewed.    Significant Imaging: I have reviewed and interpreted all pertinent imaging results/findings within the past 24 hours.

## 2020-08-30 NOTE — ASSESSMENT & PLAN NOTE
-Generally well managed but higher this morning  -Norvasc stopped due to lower extremity edema.  -Continue hydralazine and labetalol  -If elevations persist consider nifedipine.

## 2020-08-30 NOTE — PROGRESS NOTES
[facility] Hospital Medicine  Progress Note    Patient Name: Miguel Moore  MRN: [id number]  Patient Class: IP- Inpatient   Admission Date: 8/15/2020  Length of Stay: 14 days  Attending Physician: Clement Wolfe MD  Primary Care Provider: Raciel Raymond MD        Subjective:     Principal Problem:ESRD (end stage renal disease)        HPI:  65 y.o. 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  States he was called on 8/13/2020, but did not come to the ED until today.  He reports mild edema and weight gain of 1-2 lb.  Denies fever, chills, cough, SOB, chest pain, palpitations, orthopnea, PND, dizziness, syncope, n/v/d, abd pain, or dysuria.  In the ED, Cr/BUN 5.3/64, K+ 5.4, H/H 7.5/22.7, CPK 1262, , UA shows proteinura 3+, renal US suggest chronic medical renal disease.  His Nephrologist is Dr. Roa, last visit Oct 2019.  Nephrology consult.  Placed in observation.    Overview/Hospital Course:  65 y.o. AAM with history significant for DMII, HTN, stroke, and CKD4 presents for evaluation of worsening CKD sent in by provider. Creatine = 2.0 1 year ago now with creatine 5.4 and potassium 5.8 worsening swelling and anasarca 2-3+ edema up to abdomen and flank, activity intolerance and orthopnea X 5 pillows. He is not on hemodialysis.  Amlodipine stopped.  Worsening BP and started on Hydralazine.  Nephrology consulted.  BP improved, but worsening Creat with persistent hyperkalemia.  Patient will need HD.  IR consulted for HD catheter.  Temporary HD catheter placed on 8/20 and patient underwent HD.  Catheter switched to tunneled catheter on 8/21.  Patient with right sided hemiplegia from previous stroke.  Deconditioned and PT/OT consulted.  Initial recommendation for SNF.  Patient will need outpatient HD  arrangements and SW consulted.  Patient started spiking fevers overnight 8/25.  Unremarkable CXR.  BCx and UA obtained.    Interval History: No acute events overnight.  In good spirits today.  No complaints other than ongoing scrotal and penile edema which are present since admit.  Denies cp and sob.  Remains afebrile.      Review of Systems   Constitutional: Positive for activity change. Negative for appetite change and fever.   HENT: Negative for ear discharge and ear pain.    Eyes: Negative for discharge and itching.   Respiratory: Negative for cough and chest tightness.    Cardiovascular: Positive for leg swelling. Negative for chest pain.   Gastrointestinal: Negative for abdominal distention and abdominal pain.   Endocrine: Negative for cold intolerance and heat intolerance.   Genitourinary: Positive for penile swelling and scrotal swelling. Negative for difficulty urinating and dysuria.   Musculoskeletal: Negative for arthralgias and back pain.   Neurological: Positive for weakness. Negative for seizures and syncope.   Psychiatric/Behavioral: Negative for agitation, confusion and dysphoric mood.     Objective:     Vital Signs (Most Recent):  Temp: 98.5 °F (36.9 °C) (08/30/20 1122)  Pulse: 79 (08/30/20 1122)  Resp: 18 (08/30/20 1122)  BP: (!) 150/65 (08/30/20 1122)  SpO2: 99 % (08/30/20 1122) Vital Signs (24h Range):  Temp:  [97.5 °F (36.4 °C)-98.9 °F (37.2 °C)] 98.5 °F (36.9 °C)  Pulse:  [73-82] 79  Resp:  [16-18] 18  SpO2:  [98 %-99 %] 99 %  BP: (120-150)/(60-75) 150/65     Weight: 97.9 kg (215 lb 13.3 oz)  Body mass index is 32.82 kg/m².    Intake/Output Summary (Last 24 hours) at 8/30/2020 1419  Last data filed at 8/30/2020 0500  Gross per 24 hour   Intake 360 ml   Output --   Net 360 ml      Physical Exam  Vitals signs and nursing note reviewed.   Constitutional:       General: He is not in acute distress.     Appearance: He is well-developed.   HENT:      Head: Normocephalic and atraumatic.      Right  Ear: External ear normal.      Left Ear: External ear normal.      Nose: Nose normal.   Eyes:      Conjunctiva/sclera: Conjunctivae normal.      Pupils: Pupils are equal, round, and reactive to light.   Neck:      Musculoskeletal: Normal range of motion and neck supple.   Cardiovascular:      Rate and Rhythm: Normal rate and regular rhythm.   Pulmonary:      Effort: Pulmonary effort is normal. No respiratory distress.      Breath sounds: No wheezing.   Abdominal:      General: Bowel sounds are normal. There is no distension.      Palpations: Abdomen is soft.      Tenderness: There is no abdominal tenderness.      Comments: No palpable hepatomegaly or splenomegaly   Genitourinary:     Comments: Penis and scrotum quite swollen  Musculoskeletal:         General: Swelling present. No tenderness.      Comments: Deep edema up to both thighs but now starting to see wrinkling in skin of legs - indicating improvement.   Skin:     General: Skin is warm and dry.   Neurological:      Mental Status: He is alert and oriented to person, place, and time.      Comments: Right sided hemiplegia   Psychiatric:         Thought Content: Thought content normal.         Significant Labs: All pertinent labs within the past 24 hours have been reviewed.    Significant Imaging: I have reviewed and interpreted all pertinent imaging results/findings within the past 24 hours.      Assessment/Plan:      * ESRD (end stage renal disease)  -Admitted to inpatient status  -On admit had anasarca up to abdomen including significant penile and scrotal edema.    -Previously had CKD4 with baseline Cr of 3 but was 5.8 on admit.    -Noted nephrotic proteinuria  -Continue strict ins/outs and daily weights  -Dr Menard and Dr. Tamez on board and input appreciated  -Continue diuresis with lasix  -Continue dialysis per nephrology  -Outpatient HD has been arranged  -Had planned discharge 8/27 if remained afebrile and cultures negative.  Unfortunately his blood culture  grew Candida parapsilosis  -Dr. Gomez with ID consulted and case discussed with him.  Started micafungin 8/27 and now on fluconazole.  -Dialysis line removed after HD 8/28 and plan line holiday over weekend.  -Plan new line by IR on Monday.    Anasarca  -Treatment as above    Nephrotic syndrome  -Treatment as above    Candida parapsilosis infection  -Found to have candidemia - source unclear  -Started on micafungin and now converted to fluconazole  -Repeat cultures negative so far  -Dialysis line removed 8/28.  Plan to replace line Monday  -Appreciate input from Dr. Gomez.    Hypocalcemia  -Corrected calcium is 8.9  -Continue calcitrol 0.25 mcg and calcium acetate 667 po tid AC    Anemia  -Anemia of CKD  -No evidence of bleeding.  -Hb stable above transfusion threshold x 3 days  -He is folate deficient.  Started replacement 8/29  -Borderline iron deficient.  Started replacement 8/29  -Repeat cbc in AM        Enlarged prostate  -Continue home flomax - voiding well - strict I&Os      Hemiplegia and hemiparesis following cerebral infarction affecting right dominant side  -Stable, no acute issue, continue plavix/statin.  -PT/OT evaluation.  -Anticipate discharge to SNF when medically stable    Seizure disorder as sequela of cerebrovascular accident  -Continue home regimen of phenytoin  -No seizures.      Benign non-nodular prostatic hyperplasia without lower urinary tract symptoms  -Continue home tamsulosin     Controlled type 2 diabetes mellitus with stage 4 chronic kidney disease, with long-term current use of insulin  -HgbA1c 5.8  -Hold oral antihyperglycemics while inpatient  -PRN sliding scale insulin  -ACHS glucose monitoring   -ADA diet     Pure hypercholesterolemia  -Continue statin    Benign essential HTN; ACEI hyperK  -Generally well managed but higher this morning  -Norvasc stopped due to lower extremity edema.  -Continue hydralazine and labetalol  -If elevations persist consider nifedipine.      VTE Risk  Mitigation (From admission, onward)         Ordered     IP VTE HIGH RISK PATIENT  Once      08/15/20 1505     Place sequential compression device  Until discontinued      08/15/20 1505                Discharge Planning   GARY:      Code Status: Full Code   Is the patient medically ready for discharge?:     Reason for patient still in hospital (select all that apply): Treatment  Discharge Plan A: Skilled Nursing Facility   Discharge Delays: (!) Change in Medical Condition              Clement Wolfe MD  Department of Hospital Medicine   [facility]

## 2020-08-30 NOTE — PLAN OF CARE
Problem: Fall Injury Risk  Goal: Absence of Fall and Fall-Related Injury  Outcome: Ongoing, Progressing     Problem: Adult Inpatient Plan of Care  Goal: Plan of Care Review  Outcome: Ongoing, Progressing     Problem: Skin Injury Risk Increased  Goal: Skin Health and Integrity  Outcome: Ongoing, Progressing     Problem: Fluid Volume Excess  Goal: Fluid Balance  Outcome: Ongoing, Progressing     Problem: Infection (Hemodialysis)  Goal: Absence of Infection Signs/Symptoms  Outcome: Ongoing, Progressing

## 2020-08-31 LAB
ALBUMIN SERPL BCP-MCNC: 1.9 G/DL (ref 3.5–5.2)
ALP SERPL-CCNC: 88 U/L (ref 55–135)
ALT SERPL W/O P-5'-P-CCNC: 22 U/L (ref 10–44)
ANION GAP SERPL CALC-SCNC: 11 MMOL/L (ref 8–16)
AST SERPL-CCNC: 14 U/L (ref 10–40)
BACTERIA CATH TIP CULT: NO GROWTH
BASOPHILS # BLD AUTO: 0.04 K/UL (ref 0–0.2)
BASOPHILS NFR BLD: 0.6 % (ref 0–1.9)
BILIRUB SERPL-MCNC: 0.1 MG/DL (ref 0.1–1)
BUN SERPL-MCNC: 46 MG/DL (ref 8–23)
CALCIUM SERPL-MCNC: 7.5 MG/DL (ref 8.7–10.5)
CHLORIDE SERPL-SCNC: 101 MMOL/L (ref 95–110)
CO2 SERPL-SCNC: 22 MMOL/L (ref 23–29)
CREAT SERPL-MCNC: 4.7 MG/DL (ref 0.5–1.4)
DIFFERENTIAL METHOD: ABNORMAL
EOSINOPHIL # BLD AUTO: 0.2 K/UL (ref 0–0.5)
EOSINOPHIL NFR BLD: 2.8 % (ref 0–8)
ERYTHROCYTE [DISTWIDTH] IN BLOOD BY AUTOMATED COUNT: 13.3 % (ref 11.5–14.5)
EST. GFR  (AFRICAN AMERICAN): 14 ML/MIN/1.73 M^2
EST. GFR  (NON AFRICAN AMERICAN): 12 ML/MIN/1.73 M^2
GLUCOSE SERPL-MCNC: 152 MG/DL (ref 70–110)
HCT VFR BLD AUTO: 24.7 % (ref 40–54)
HGB BLD-MCNC: 8.2 G/DL (ref 14–18)
IMM GRANULOCYTES # BLD AUTO: 0.04 K/UL (ref 0–0.04)
IMM GRANULOCYTES NFR BLD AUTO: 0.6 % (ref 0–0.5)
INR PPP: 1 (ref 0.8–1.2)
LYMPHOCYTES # BLD AUTO: 0.9 K/UL (ref 1–4.8)
LYMPHOCYTES NFR BLD: 11.8 % (ref 18–48)
MAGNESIUM SERPL-MCNC: 2 MG/DL (ref 1.6–2.6)
MCH RBC QN AUTO: 24.9 PG (ref 27–31)
MCHC RBC AUTO-ENTMCNC: 33.2 G/DL (ref 32–36)
MCV RBC AUTO: 75 FL (ref 82–98)
MONOCYTES # BLD AUTO: 0.6 K/UL (ref 0.3–1)
MONOCYTES NFR BLD: 7.7 % (ref 4–15)
NEUTROPHILS # BLD AUTO: 5.6 K/UL (ref 1.8–7.7)
NEUTROPHILS NFR BLD: 76.5 % (ref 38–73)
NRBC BLD-RTO: 0 /100 WBC
PHOSPHATE SERPL-MCNC: 2.7 MG/DL (ref 2.7–4.5)
PLATELET # BLD AUTO: 219 K/UL (ref 150–350)
PMV BLD AUTO: 9.7 FL (ref 9.2–12.9)
POCT GLUCOSE: 135 MG/DL (ref 70–110)
POCT GLUCOSE: 154 MG/DL (ref 70–110)
POTASSIUM SERPL-SCNC: 4 MMOL/L (ref 3.5–5.1)
PROT SERPL-MCNC: 5.1 G/DL (ref 6–8.4)
PROTHROMBIN TIME: 10.7 SEC (ref 9–12.5)
RBC # BLD AUTO: 3.29 M/UL (ref 4.6–6.2)
SODIUM SERPL-SCNC: 134 MMOL/L (ref 136–145)
WBC # BLD AUTO: 7.27 K/UL (ref 3.9–12.7)

## 2020-08-31 PROCEDURE — 85025 COMPLETE CBC W/AUTO DIFF WBC: CPT

## 2020-08-31 PROCEDURE — 90935 HEMODIALYSIS ONE EVALUATION: CPT

## 2020-08-31 PROCEDURE — 63700000 PHARM REV CODE 250 ALT 637 W/O HCPCS: Performed by: INTERNAL MEDICINE

## 2020-08-31 PROCEDURE — 25000003 PHARM REV CODE 250: Performed by: HOSPITALIST

## 2020-08-31 PROCEDURE — 84100 ASSAY OF PHOSPHORUS: CPT

## 2020-08-31 PROCEDURE — 36415 COLL VENOUS BLD VENIPUNCTURE: CPT

## 2020-08-31 PROCEDURE — 99232 SBSQ HOSP IP/OBS MODERATE 35: CPT | Mod: ,,, | Performed by: HOSPITALIST

## 2020-08-31 PROCEDURE — 83735 ASSAY OF MAGNESIUM: CPT

## 2020-08-31 PROCEDURE — 25000003 PHARM REV CODE 250: Performed by: INTERNAL MEDICINE

## 2020-08-31 PROCEDURE — 21400001 HC TELEMETRY ROOM

## 2020-08-31 PROCEDURE — 99232 SBSQ HOSP IP/OBS MODERATE 35: CPT | Mod: ,,, | Performed by: INTERNAL MEDICINE

## 2020-08-31 PROCEDURE — 99232 PR SUBSEQUENT HOSPITAL CARE,LEVL II: ICD-10-PCS | Mod: ,,, | Performed by: INTERNAL MEDICINE

## 2020-08-31 PROCEDURE — 80053 COMPREHEN METABOLIC PANEL: CPT

## 2020-08-31 PROCEDURE — 63600175 PHARM REV CODE 636 W HCPCS: Performed by: RADIOLOGY

## 2020-08-31 PROCEDURE — 85610 PROTHROMBIN TIME: CPT

## 2020-08-31 PROCEDURE — 94761 N-INVAS EAR/PLS OXIMETRY MLT: CPT

## 2020-08-31 PROCEDURE — 99232 PR SUBSEQUENT HOSPITAL CARE,LEVL II: ICD-10-PCS | Mod: ,,, | Performed by: HOSPITALIST

## 2020-08-31 RX ORDER — FLUCONAZOLE 200 MG/1
200 TABLET ORAL NIGHTLY
Start: 2020-08-31 | End: 2020-10-12

## 2020-08-31 RX ORDER — LABETALOL 100 MG/1
100 TABLET, FILM COATED ORAL EVERY 12 HOURS
Qty: 60 TABLET | Refills: 11 | Status: SHIPPED | OUTPATIENT
Start: 2020-08-31 | End: 2020-12-15 | Stop reason: SDUPTHER

## 2020-08-31 RX ORDER — HEPARIN SODIUM 5000 [USP'U]/ML
5000 INJECTION, SOLUTION INTRAVENOUS; SUBCUTANEOUS
Status: DISCONTINUED | OUTPATIENT
Start: 2020-08-31 | End: 2020-09-01 | Stop reason: HOSPADM

## 2020-08-31 RX ORDER — FENTANYL CITRATE 50 UG/ML
INJECTION, SOLUTION INTRAMUSCULAR; INTRAVENOUS CODE/TRAUMA/SEDATION MEDICATION
Status: COMPLETED | OUTPATIENT
Start: 2020-08-31 | End: 2020-08-31

## 2020-08-31 RX ORDER — HEPARIN SODIUM 5000 [USP'U]/ML
10000 INJECTION, SOLUTION INTRAVENOUS; SUBCUTANEOUS ONCE
Status: COMPLETED | OUTPATIENT
Start: 2020-08-31 | End: 2020-08-31

## 2020-08-31 RX ORDER — FERROUS GLUCONATE 324(37.5)
324 TABLET ORAL 2 TIMES DAILY WITH MEALS
Qty: 60 TABLET | Refills: 11 | COMMUNITY
Start: 2020-08-31 | End: 2022-08-18

## 2020-08-31 RX ORDER — HYDRALAZINE HYDROCHLORIDE 25 MG/1
25 TABLET, FILM COATED ORAL EVERY 8 HOURS
Qty: 90 TABLET | Refills: 11 | Status: SHIPPED | OUTPATIENT
Start: 2020-08-31 | End: 2020-12-15 | Stop reason: SDUPTHER

## 2020-08-31 RX ORDER — FOLIC ACID 1 MG/1
1 TABLET ORAL DAILY
Qty: 30 TABLET | Refills: 11
Start: 2020-09-01 | End: 2020-12-15 | Stop reason: SDUPTHER

## 2020-08-31 RX ORDER — CALCITRIOL 0.25 UG/1
0.25 CAPSULE ORAL DAILY
Start: 2020-09-01 | End: 2023-02-19 | Stop reason: SDUPTHER

## 2020-08-31 RX ORDER — MIDAZOLAM HYDROCHLORIDE 1 MG/ML
INJECTION INTRAMUSCULAR; INTRAVENOUS CODE/TRAUMA/SEDATION MEDICATION
Status: COMPLETED | OUTPATIENT
Start: 2020-08-31 | End: 2020-08-31

## 2020-08-31 RX ADMIN — FENTANYL CITRATE 50 MCG: 50 INJECTION INTRAMUSCULAR; INTRAVENOUS at 12:08

## 2020-08-31 RX ADMIN — ATORVASTATIN CALCIUM 80 MG: 40 TABLET, FILM COATED ORAL at 08:08

## 2020-08-31 RX ADMIN — PHENYTOIN SODIUM 100 MG: 100 CAPSULE ORAL at 08:08

## 2020-08-31 RX ADMIN — LABETALOL HYDROCHLORIDE 100 MG: 100 TABLET, FILM COATED ORAL at 09:08

## 2020-08-31 RX ADMIN — TAMSULOSIN HYDROCHLORIDE 0.8 MG: 0.4 CAPSULE ORAL at 08:08

## 2020-08-31 RX ADMIN — FERROUS GLUCONATE TAB 324 MG (37.5 MG ELEMENTAL IRON) 324 MG: 324 (37.5 FE) TAB at 02:08

## 2020-08-31 RX ADMIN — HEPARIN SODIUM 10000 UNITS: 5000 INJECTION, SOLUTION INTRAVENOUS; SUBCUTANEOUS at 12:08

## 2020-08-31 RX ADMIN — FOLIC ACID 1 MG: 1 TABLET ORAL at 08:08

## 2020-08-31 RX ADMIN — HYDRALAZINE HYDROCHLORIDE 25 MG: 25 TABLET, FILM COATED ORAL at 09:08

## 2020-08-31 RX ADMIN — MAGNESIUM OXIDE TAB 400 MG (241.3 MG ELEMENTAL MG) 400 MG: 400 (241.3 MG) TAB at 08:08

## 2020-08-31 RX ADMIN — CALCITRIOL CAPSULES 0.25 MCG 0.25 MCG: 0.25 CAPSULE ORAL at 08:08

## 2020-08-31 RX ADMIN — TRAZODONE HYDROCHLORIDE 50 MG: 50 TABLET ORAL at 09:08

## 2020-08-31 RX ADMIN — PHENYTOIN SODIUM 100 MG: 100 CAPSULE ORAL at 09:08

## 2020-08-31 RX ADMIN — MIDAZOLAM HYDROCHLORIDE 1 MG: 1 INJECTION, SOLUTION INTRAMUSCULAR; INTRAVENOUS at 12:08

## 2020-08-31 RX ADMIN — PHENYTOIN SODIUM 100 MG: 100 CAPSULE ORAL at 02:08

## 2020-08-31 RX ADMIN — FLUCONAZOLE 200 MG: 100 TABLET ORAL at 09:08

## 2020-08-31 NOTE — CONSULTS
Inpatient Radiology Pre-procedure Note    History of Present Illness:  Miguel Moore is a 65 y.o. male with pertinent PMHx of DM2, HTN, nephrotic syndrome and ANA on stage IV CKD requiring long-term central venous access for tentative outpatient HD. Of note, pt is on MONE and right-handed with need to protect LUE for future access.      A new inpatient IR consult received for US and fluoroscopic-guided placement of a RIJV-approach 19.0-cm THDC.     Admission H&P reviewed.  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 Tay    CATARACT EXTRACTION W/  INTRAOCULAR LENS IMPLANT Left 10/19/2017    Dr. Tay    ESOPHAGOGASTRODUODENOSCOPY N/A 8/17/2020    Procedure: EGD (ESOPHAGOGASTRODUODENOSCOPY);  Surgeon: Desmond Chapman MD;  Location: [facility];  Service: Endoscopy;  Laterality: N/A;    FEMORAL ARTERY STENT      KNEE SURGERY      bilateral scope     Review of Systems:   As documented in primary team H&P    Home Meds:   Prior to Admission medications    Medication Sig Start Date End Date Taking? Authorizing Provider   amLODIPine (NORVASC) 10 MG tablet Take 1 tablet (10 mg total) by mouth once daily. 7/31/20 7/31/21 Yes Raciel Raymond MD   atorvastatin (LIPITOR) 80 MG tablet Take 1 tablet (80 mg total) by mouth once daily. 7/31/20 7/31/21 Yes Raciel Raymond MD   baclofen (LIORESAL) 10 MG tablet Take 1 tablet (10 mg total) by mouth 4 (four) times daily. 7/31/20 7/31/21 Yes Raciel Raymond MD   cholecalciferol, vitamin D3, (VITAMIN D3) 1,000 unit capsule Take 1 capsule (1,000 Units total) by mouth once daily. 1/4/19  Yes Raciel Raymond MD   clopidogreL (PLAVIX) 75 mg tablet Take 1 tablet (75 mg total) by mouth once daily. 7/31/20  Yes Raciel TERESA  "MD Mandi   furosemide (LASIX) 20 MG tablet Take 1 tablet (20 mg total) by mouth once daily. 7/31/20  Yes Raciel Raymond MD   insulin detemir U-100 (LEVEMIR FLEXTOUCH U-100 INSULN) 100 unit/mL (3 mL) InPn pen Inject 16 Units into the skin every evening. STOP NOVOLOG 8/13/20 8/13/21 Yes RAFAEL Jordan-MICHELLE   tamsulosin (FLOMAX) 0.4 mg Cap Take 2 capsules (0.8 mg total) by mouth once daily. 7/31/20  Yes Raciel Raymond MD   blood-glucose meter Misc 1 each by Misc.(Non-Drug; Combo Route) route once daily. Pharmacy-whichever brand specified by insurance 5/11/18 9/9/19  Raciel Raymond MD   furosemide (LASIX) 20 MG tablet Take two tablets twice a day 8/13/20   RENZO Jordan   lancets (ONETOUCH DELICA LANCETS) 33 gauge Misc 1 lancet by Misc.(Non-Drug; Combo Route) route once daily. ONCE DAILY BLOOD SUGAR TESTING; [employer] BRAND COVERED BY INSURANCE 5/11/18 9/9/19  Raciel Raymond MD   pen needle, diabetic 31 gauge x 1/4" Ndle For mealtime insulin  Patient not taking: Reported on 8/13/2020 5/11/18   Raciel Raymond MD   phenytoin (DILANTIN) 100 MG ER capsule Take 1 capsule (100 mg total) by mouth 3 (three) times daily. 7/31/20   Raciel Raymond MD     Scheduled Meds:    atorvastatin  80 mg Oral Daily    calcitRIOL  0.25 mcg Oral Daily    clopidogreL  75 mg Oral Daily    epoetin marisol-epbx  10,000 Units Subcutaneous Every Tues, Thurs, Sat    ferrous gluconate  324 mg Oral BID WM    fluconazole  200 mg Oral QHS    folic acid  1 mg Oral Daily    hydrALAZINE  25 mg Oral Q8H    labetaloL  100 mg Oral Q12H    lactulose  30 g Oral Once    magnesium oxide  400 mg Oral BID    phenytoin  100 mg Oral TID    tamsulosin  2 capsule Oral Daily    trazodone  50 mg Oral QHS     Continuous Infusions:   PRN Meds:acetaminophen, dextrose 50%, dextrose 50%, diazePAM, glucagon (human recombinant), glucose, glucose, hydrALAZINE, insulin aspart U-100, oxyCODONE-acetaminophen, senna-docusate 8.6-50 mg, sodium " chloride 0.9%, sodium chloride 0.9%     Anticoagulants/Antiplatelets: aspirin and Plavix    Allergies:   Review of patient's allergies indicates:   Allergen Reactions    Ace inhibitors      Hyperkalemia 8/2018    Penicillins Hives    Tizanidine      Felt hot     Sedation Hx: have not been any systemic reactions    Labs:  Recent Labs   Lab 08/31/20 0805   INR 1.0       Recent Labs   Lab 08/31/20 0607   WBC 7.27   HGB 8.2*   HCT 24.7*   MCV 75*         Recent Labs   Lab 08/31/20 0607   *   *   K 4.0      CO2 22*   BUN 46*   CREATININE 4.7*   CALCIUM 7.5*   MG 2.0   ALT 22   AST 14   ALBUMIN 1.9*   BILITOT 0.1     Vitals:  Temp: 98.2 °F (36.8 °C) (08/31/20 0829)  Pulse: 76 (08/31/20 0829)  Resp: 18 (08/31/20 0829)  BP: (!) 114/58 (08/31/20 0829)  SpO2: 99 % (08/31/20 0829)     Physical Exam:  ASA: III  Mallampati: II     General: no acute distress  Mental Status: alert and oriented to person, place and time  HEENT: normocephalic, atraumatic  Chest: unlabored breathing  Heart: regular heart rate  Abdomen: nondistended  Extremity: moves all extremities    A/P:  65 y.o. male with ANA on stage IV CKD and nephrotic syndrome requiring central venous access for HD. Of note, pt is on MONE and right-handed with need to protect LUE for future access.      1. ANA on stage IV CKD and nephrotic syndrome - Will attempt US and fluoroscopic-guided placement of a RIJV-approach 19.0-cm THDC under moderate conscious sedation today.     Risks (including, but not limited to, pain, bleeding, infection, damage to nearby structures, failure to obtain sufficient material for a diagnosis, the need for additional procedures, and death), benefits, and alternatives were discussed with the patient. All questions were answered to the best of my abilities. The patient wishes to proceed with the procedure. Written informed consent was obtained.     Thank you for considering IR for the care of your patient.      Eran  MD Trav  Interventional Radiology

## 2020-08-31 NOTE — SEDATION DOCUMENTATION
MD assessing R neck/RCW for optimal access site via ultrasound guidance. Lidocaine administered per MD.

## 2020-08-31 NOTE — PLAN OF CARE
Problem: Fall Injury Risk  Goal: Absence of Fall and Fall-Related Injury  Outcome: Ongoing, Progressing  Intervention: Identify and Manage Contributors to Fall Injury Risk  Flowsheets (Taken 8/31/2020 0426)  Self-Care Promotion:   independence encouraged   BADL personal objects within reach  Medication Review/Management: medications reviewed  Intervention: Promote Injury-Free Environment  Flowsheets (Taken 8/31/2020 0426)  Safety Promotion/Fall Prevention:   side rails raised x 2   bed alarm set   assistive device/personal item within reach   medications reviewed   instructed to call staff for mobility  Environmental Safety Modification:   assistive device/personal items within reach   clutter free environment maintained     Problem: Adult Inpatient Plan of Care  Goal: Plan of Care Review  Outcome: Ongoing, Progressing  Problem: Diabetes Comorbidity  Goal: Blood Glucose Level Within Desired Range  Outcome: Ongoing, Progressing  Intervention: Maintain Glycemic Control  Flowsheets (Taken 8/31/2020 0426)  Glycemic Management:   blood glucose monitoring   supplemental insulin given     Problem: Skin Injury Risk Increased  Goal: Skin Health and Integrity  Outcome: Ongoing, Progressing  Intervention: Optimize Skin Protection  Flowsheets (Taken 8/31/2020 0426)  Pressure Reduction Techniques:   frequent weight shift encouraged   weight shift assistance provided   heels elevated off bed  Pressure Reduction Devices: pressure-redistributing mattress utilized  Skin Protection:   adhesive use limited   electrode sites changed   incontinence pads utilized   skin sealant/moisture barrier applied   tubing/devices free from skin contact  Head of Bed (HOB): HOB elevated  Intervention: Promote and Optimize Oral Intake  Flowsheets (Taken 8/31/2020 0426)  Oral Nutrition Promotion: rest periods promoted     Problem: Infection  Goal: Infection Symptom Resolution  Outcome: Ongoing, Progressing  Intervention: Prevent or Manage  Infection  Flowsheets (Taken 8/31/2020 0426)  Fever Reduction/Comfort Measures: medication administered  Infection Management: aseptic technique maintained  Isolation Precautions: protective environment maintained     Problem: Fluid Volume Excess  Goal: Fluid Balance  Outcome: Ongoing, Progressing  Intervention: Monitor and Manage Hypervolemia  Flowsheets (Taken 8/31/2020 0426)  Skin Protection:   adhesive use limited   electrode sites changed   incontinence pads utilized   skin sealant/moisture barrier applied   tubing/devices free from skin contact      Problem: Infection (Hemodialysis)  Goal: Absence of Infection Signs/Symptoms  Outcome: Ongoing, Progressing  Intervention: Prevent or Manage Infection  Flowsheets (Taken 8/31/2020 0426)  Fever Reduction/Comfort Measures: medication administered  Infection Management: aseptic technique maintained

## 2020-08-31 NOTE — SEDATION DOCUMENTATION
Access obtained into R IJ. Guidewire advanced through vessel per MD. Tolerated well. No cardiac arrhythmias noted on monitor.

## 2020-08-31 NOTE — PROGRESS NOTES
[facility]  Infectious Disease  Progress Note    Patient Name: Miguel Moore  MRN: [id number]  Admission Date: 8/15/2020  Length of Stay: 15 days  Attending Physician: Rosalva Manning MD  Primary Care Provider: Raciel Raymond MD    Isolation Status: No active isolations  Assessment/Plan:      Candida parapsilosis infection  Afebrile and without leukocytosis. Repeat blood cultures 8/29 remain NGTD.   · Continue renally dosed fluconazole.   · Need ophthalmologic evaluation to assess for fungal endophthalmitis and to determine to christopher duration of antifungal agents.   · If no endophthalmitis then will plan for 2 weeks from 8/28. Otherwise would need at least 4-6 weeks of treatment.       Anticipated Disposition: per primary    Thank you for your consult. I will follow-up with patient. Please contact us if you have any additional questions.    Day Currie MD  Infectious Disease  [facility]    Subjective:     Principal Problem:ESRD (end stage renal disease)    HPI: 65 y.o. AAM with history significant for DMII, HTN, stroke, and CKD4 presents for evaluation of worsening CKD sent in by provider. Creatine = 2.0 1 year ago now with creatine 5.4 and potassium 5.8 worsening swelling and anasarca 2-3+ edema up to abdomen and flank, activity intolerance and orthopnea X 5 pillows. He is not on hemodialysis.  Amlodipine stopped.  Worsening BP and started on Hydralazine.  Nephrology consulted.  BP improved, but worsening Creat with persistent hyperkalemia.  Patient will need HD.  IR consulted for HD catheter.  Temporary HD catheter placed on 8/20 and patient underwent HD.  Catheter switched to tunneled catheter on 8/21.  Patient with right sided hemiplegia from previous stroke.  Deconditioned and PT/OT consulted.  Initial recommendation for SNF.  Patient will need outpatient HD arrangements and SW consulted.  Patient started spiking fevers overnight 8/25.  Unremarkable CXR.  BCx and UA  "obtained. Blood culture from the 25th is positive and the Gram stain demonstrates "budding yeasts." ID is consulted for that. Micafungin started. Patient feels well. No complaints. No visual changes.     Interval History: "Taking it day by day". Patient with C parapsilosis candidemia. Repeat blood cultures 8/28 NGTD thus far.    Review of Systems   Constitutional: Negative for chills, fatigue and fever.   HENT: Negative for ear pain, mouth sores, nosebleeds, postnasal drip, rhinorrhea, sinus pressure, sore throat, tinnitus, trouble swallowing and voice change.    Eyes: Negative for photophobia, pain, redness and visual disturbance.   Respiratory: Negative for apnea, cough, chest tightness, shortness of breath and wheezing.    Cardiovascular: Negative for chest pain, palpitations and leg swelling.   Gastrointestinal: Negative for abdominal pain, blood in stool, constipation, diarrhea, nausea and vomiting.   Endocrine: Negative for cold intolerance, heat intolerance, polydipsia and polyuria.   Genitourinary: Negative for decreased urine volume, difficulty urinating, discharge, dysuria, flank pain, frequency, genital sores, hematuria, penile pain, penile swelling, scrotal swelling, testicular pain and urgency.   Musculoskeletal: Negative for arthralgias, back pain, joint swelling, myalgias and neck pain.   Skin: Negative for color change and rash.   Allergic/Immunologic: Negative for environmental allergies and food allergies.   Neurological: Negative for dizziness, seizures, syncope, weakness, light-headedness, numbness and headaches.   Hematological: Negative for adenopathy. Does not bruise/bleed easily.   Psychiatric/Behavioral: Negative for agitation, confusion, decreased concentration, hallucinations, self-injury, sleep disturbance and suicidal ideas. The patient is not nervous/anxious.      Objective:     Vital Signs (Most Recent):  Temp: 98.2 °F (36.8 °C) (08/31/20 0829)  Pulse: 76 (08/31/20 0829)  Resp: 18 " (08/31/20 0829)  BP: (!) 114/58 (08/31/20 0829)  SpO2: 99 % (08/31/20 0829) Vital Signs (24h Range):  Temp:  [97.7 °F (36.5 °C)-98.7 °F (37.1 °C)] 98.2 °F (36.8 °C)  Pulse:  [74-86] 76  Resp:  [17-18] 18  SpO2:  [97 %-99 %] 99 %  BP: (114-181)/(58-80) 114/58     Weight: 98.3 kg (216 lb 11.4 oz)  Body mass index is 32.95 kg/m².    Estimated Creatinine Clearance: 17.6 mL/min (A) (based on SCr of 4.7 mg/dL (H)).    Physical Exam  Vitals signs reviewed.   Constitutional:       Appearance: He is well-developed.   HENT:      Head: Normocephalic and atraumatic.      Right Ear: External ear normal.      Left Ear: External ear normal.   Eyes:      Conjunctiva/sclera: Conjunctivae normal.   Neck:      Musculoskeletal: Normal range of motion and neck supple.      Thyroid: No thyromegaly.   Cardiovascular:      Rate and Rhythm: Normal rate and regular rhythm.      Heart sounds: Normal heart sounds. No murmur.   Pulmonary:      Effort: Pulmonary effort is normal.      Breath sounds: Normal breath sounds. No wheezing or rales.   Abdominal:      General: Bowel sounds are normal.      Palpations: Abdomen is soft. There is no mass.      Tenderness: There is no abdominal tenderness. There is no rebound.   Lymphadenopathy:      Cervical: No cervical adenopathy.   Skin:     General: Skin is warm and dry.   Neurological:      Mental Status: He is alert and oriented to person, place, and time.   Psychiatric:         Mood and Affect: Affect is angry.         Behavior: Behavior normal.         Significant Labs:   Blood Culture:   Recent Labs   Lab 08/25/20  0200 08/28/20  0803 08/28/20  0808   LABBLOO Gram stain kelsey bottle: budding yeast   08/26/2020  21:10    Results called to and read back by: JB BEAUCHAMP/W337  CANDIDA PARAPSILOSIS* No Growth to date  No Growth to date  No Growth to date  No Growth to date No Growth to date  No Growth to date  No Growth to date  No Growth to date     BMP:   Recent Labs   Lab  08/31/20  0607   *   *   K 4.0      CO2 22*   BUN 46*   CREATININE 4.7*   CALCIUM 7.5*   MG 2.0     CBC:   Recent Labs   Lab 08/30/20  0453 08/31/20  0607   WBC 6.63 7.27   HGB 7.5* 8.2*   HCT 23.3* 24.7*    219       Significant Imaging: I have reviewed all pertinent imaging results/findings within the past 24 hours.

## 2020-08-31 NOTE — SEDATION DOCUMENTATION
Catheter placement confirmed under fluoro per MD. Both ports with blood return and easily flushed.

## 2020-08-31 NOTE — PROGRESS NOTES
Renal Progress Note    Date of Admission:  8/15/2020 12:34 PM    Length of Stay: 15  Days    Subjective: no complaints    Objective:    Current Facility-Administered Medications   Medication    acetaminophen tablet 650 mg    atorvastatin tablet 80 mg    calcitRIOL capsule 0.25 mcg    clopidogreL tablet 75 mg    dextrose 50% injection 12.5 g    dextrose 50% injection 25 g    diazePAM tablet 2 mg    epoetin marisol-epbx injection 10,000 Units    ferrous gluconate tablet 324 mg    fluconazole tablet 200 mg    folic acid tablet 1 mg    glucagon (human recombinant) injection 1 mg    glucose chewable tablet 16 g    glucose chewable tablet 24 g    hydrALAZINE injection 10 mg    hydrALAZINE tablet 25 mg    insulin aspart U-100 pen 1-10 Units    labetaloL tablet 100 mg    lactulose 20 gram/30 mL solution Soln 30 g    magnesium oxide tablet 400 mg    oxyCODONE-acetaminophen 5-325 mg per tablet 1 tablet    phenytoin (DILANTIN) ER capsule 100 mg    senna-docusate 8.6-50 mg per tablet 1 tablet    sodium chloride 0.9% flush 10 mL    sodium chloride 0.9% flush 10 mL    tamsulosin 24 hr capsule 0.8 mg    traZODone tablet 50 mg       Vitals:    08/30/20 2303 08/30/20 2313 08/31/20 0555 08/31/20 0829   BP: (!) 129/58 (!) 156/78 121/71 (!) 114/58   BP Location: Left leg Left arm Left leg Left leg   Patient Position: Lying Lying Lying Lying   Pulse: 86  80 76   Resp: 18  18 18   Temp: 97.7 °F (36.5 °C)  98.7 °F (37.1 °C) 98.2 °F (36.8 °C)   TempSrc: Oral  Oral Oral   SpO2: 99%  99% 99%   Weight:   98.3 kg (216 lb 11.4 oz)    Height:           I/O last 3 completed shifts:  In: 360 [P.O.:360]  Out: -   No intake/output data recorded.      Physical Exam:     General: NAD  Neck: supple  Heart: RRR  Lungs: unlabored breathing  Abdomen: n/a  Limbs: n/a  Neurologic: awake      Laboratories:    Recent Labs   Lab 08/31/20  0607   WBC 7.27   RBC 3.29*   HGB 8.2*   HCT 24.7*      MCV 75*    MCH 24.9*   MCHC 33.2       Recent Labs   Lab 08/31/20  0607   CALCIUM 7.5*   PROT 5.1*   *   K 4.0   CO2 22*      BUN 46*   CREATININE 4.7*   ALKPHOS 88   ALT 22   AST 14   BILITOT 0.1       No results for input(s): COLORU, CLARITYU, SPECGRAV, PHUR, PROTEINUA, GLUCOSEU, BLOODU, WBCU, RBCU, BACTERIA, MUCUS in the last 24 hours.    Invalid input(s):  BILIRUBINCON    Microbiology Results (last 7 days)     Procedure Component Value Units Date/Time    Blood culture [[id number]] Collected: 08/28/20 0803    Order Status: Completed Specimen: Blood from Peripheral, Right Hand Updated: 08/31/20 0903     Blood Culture, Routine No Growth to date      No Growth to date      No Growth to date      No Growth to date    Blood culture [[id number]] Collected: 08/28/20 0808    Order Status: Completed Specimen: Blood from Peripheral, Right Updated: 08/31/20 0903     Blood Culture, Routine No Growth to date      No Growth to date      No Growth to date      No Growth to date    IV catheter culture [[id number]] Collected: 08/28/20 1506    Order Status: Completed Specimen: Catheter Tip, Tunneled Updated: 08/31/20 0849     Aerobic Culture - Cath tip No growth    Blood culture [[id number]]  (Abnormal) Collected: 08/25/20 0200    Order Status: Completed Specimen: Blood from Peripheral, Right Wrist Updated: 08/28/20 1133     Blood Culture, Routine Gram stain kelsey bottle: budding yeast       08/26/2020  21:10        Results called to and read back by: JB BEAUCHAMP/W337      DEANNE PARAPSILOSIS            Diagnostic Tests: n/a        Assessment:    64 y/o male admitted with:    - Progression of CKD-4 (followed by Dr. Roa) to ESRD requiring initiation of HD via DC s/p line holiday 2nd. Candidemia  - Hyperkalemia  - Candidemia  - Anemia r/o GIB  - DM-2  - HTN  - BPH  - Hypoalbuminemia          Plan:    - Micafungin as per ID  - Resume Dialysis Q MWF  - Renal ADA diet  - GI following s/p EGD w/ biopsies  - Stop MgOx (nl  Mg++)  - f/u Labs.  - Other problems per admitting      Will f/u on Dialysis days only.

## 2020-08-31 NOTE — SEDATION DOCUMENTATION
Procedure end. Tolerated THDC placement well. Both ports heparin locked. Site with biopatch, gauze and tegaderm, CDI, no redness, swelling or hematoma noted. RAYSA ONEAL.

## 2020-08-31 NOTE — CONSULTS
[facility] Hospital Medicine  Telemedicine Consult Note       Patient will be accepted to virtual medicine starting 6am tomorrow.      Rosalva Manning MD  [facility] Medicine Staff

## 2020-08-31 NOTE — ASSESSMENT & PLAN NOTE
Afebrile and without leukocytosis. Repeat blood cultures 8/29 remain NGTD.   · Continue renally dosed fluconazole.   · Need ophthalmologic evaluation to assess for fungal endophthalmitis and to determine to christopher duration of antifungal agents.   · If no endophthalmitis then will plan for 2 weeks from 8/28. Otherwise would need at least 4-6 weeks of treatment.

## 2020-08-31 NOTE — SEDATION DOCUMENTATION
Catheter advanced over guidewire into vessel. Guidewire removed. Denies pain or discomfort. NSR on monitor.

## 2020-08-31 NOTE — PLAN OF CARE
Important Message from Medicare and discharge appeal process reviewed with patient's daughter, Maddy due to patient being in Hemodialysis; copy emailed to [email or web address]       08/31/20 1642   Medicare Message   Important Message from Medicare regarding Discharge Appeal Rights Given to patient/caregiver;Explained to patient/caregiver;Signed/date by patient/caregiver   Date IMM was signed 08/31/20   Time IMM was signed [id number]

## 2020-08-31 NOTE — PLAN OF CARE
[facility]     Department of Hospital Medicine     [address] [place] [address]     [phone] after hours  [phone] fax       NURSING HOME ORDERS    08/31/2020    Admit to Nursing Home:  Skilled Bed                                                  Diagnoses:  Active Hospital Problems    Diagnosis  POA    *ESRD (end stage renal disease) [N18.6]  Yes     2/27/20 renal US with dopplers no ALF.  Medical renal disease  8/2020 renal US: 1. Symmetric diffusely increased cortical echogenicity and elevated resistive indices, nonspecific indicators of chronic medical renal disease.  2. Prostatomegaly.  Some of the provided images are suggestive of a distinct hyperechoic component of the prostate gland, of uncertain etiology or significance, and potentially artifactual.  Dedicated prostate ultrasound or MRI may be of benefit for further characterization.     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      Seizure disorder as sequela of cerebrovascular accident [I69.398, G40.909]  Not Applicable     Chronic    Hemiplegia and hemiparesis following cerebral infarction affecting right dominant side [I69.351]  Not Applicable     Chronic    Pure hypercholesterolemia [E78.00]  Yes     Chronic      Resolved Hospital Problems    Diagnosis Date Resolved POA    Candidemia [B37.7] 08/28/2020 No    Fungemia [B49] 08/27/2020 No    Fungemia [B49]  08/28/2020 No    Hyperkalemia [E87.5] 08/21/2020 Yes    Non-traumatic rhabdomyolysis [M62.82] 08/21/2020 Yes    Type 2 diabetes mellitus with diabetic neuropathy, with long-term current use of insulin [E11.40, Z79.4] 08/26/2020 Not Applicable       Patient is homebound due to:  ESRD (end stage renal disease)    Allergies:  Review of patient's allergies indicates:   Allergen Reactions    Ace inhibitors      Hyperkalemia 8/2018    Penicillins Hives    Tizanidine      Felt hot       Vitals:  Every shift (Skilled Nursing patients)    Diet: renal   Supplement:  1 can every three times a day with meals                         Type:   Nepro      Acitivities: Per PT   - Up in a chair each morning as tolerated   - Ambulate with assistance to bathroom   - Scheduled walks once each shift (every 8 hours)    LABS:  Per facility protocol    Nursing Precautions:     - Aspiration precautions:             - Total assistance with meals            -  Upright 90 degrees befor during and after meals             -  Suction at bedside          - Fall precautions per nursing home protocol   - Seizure precaution per snf protocol   - Decubitus precautions:        -  for positioning   - Pressure reducing foam mattress   - Turn patient every two hours. Use wedge pillows to anchor patient    CONSULTS:      Physical Therapy to evaluate and treat     Occupational Therapy to evaluate and treat     Speech Therapy  to evaluate and treat     Nutrition to evaluate and recommend diet     Psychiatry to evaluate and follow patients for delirium    MISCELLANEOUS CARE:     Routine Skin for Bedridden Patients:  Apply moisture barrier cream to all    skin folds and wet areas in perineal area daily and after baths and                           all bowel movements.                  DIABETES CARE:      Check blood sugar:       Fingerstick blood sugar AC and HS   Fingerstick blood sugar every 6 hours if unable to eat      Report CBG < 60 or  > 400 to physician.                                          Insulin Sliding Scale          Glucose  Novolog Insulin Subcutaneous        0 - 60   Orange juice or glucose tablet, hold insulin      No insulin   201-250  2 units   251-300  4 units   301-350  6 units   351-400  8 units   >400   10 units then call physician      Medications: Discontinue all previous medication orders, if any. See new list below.     Miguel Moore   Home Medication Instructions ROSE:[id number]    Printed on:08/31/20 6473   Medication Information                      amLODIPine (NORVASC) 10 MG tablet  Take 1 tablet (10 mg total) by mouth once daily.             atorvastatin (LIPITOR) 80 MG tablet  Take 1 tablet (80 mg total) by mouth once daily.             blood-glucose meter Misc  1 each by Misc.(Non-Drug; Combo Route) route once daily. Pharmacy-whichever brand specified by insurance             calcitRIOL (ROCALTROL) 0.25 MCG Cap  Take 1 capsule (0.25 mcg total) by mouth once daily.             cholecalciferol, vitamin D3, (VITAMIN D3) 1,000 unit capsule  Take 1 capsule (1,000 Units total) by mouth once daily.             clopidogreL (PLAVIX) 75 mg tablet  Take 1 tablet (75 mg total) by mouth once daily.             ferrous gluconate 324 mg (37.5 mg iron) Tab tablet  Take 1 tablet (324 mg total) by mouth 2 (two) times daily with meals.             fluconazole (DIFLUCAN) 200 MG Tab  Take 1 tablet (200 mg total) by mouth every evening.  Tentative end date 9/11/2020 If no endophthalmitis. If noted to have endophthalmitis during optho visit, would need at least 4-6 weeks of treatment           folic acid (FOLVITE) 1 MG tablet  Take 1 tablet (1 mg total) by mouth once daily.             hydrALAZINE (APRESOLINE) 25 MG tablet  Take 1 tablet (25 mg total) by mouth every 8 (eight) hours.             insulin detemir U-100 (LEVEMIR FLEXTOUCH U-100 INSULN) 100 unit/mL (3 mL) InPn pen  Inject 16 Units into the skin every evening. STOP  "NOVOLOG             labetaloL (NORMODYNE) 100 MG tablet  Take 1 tablet (100 mg total) by mouth every 12 (twelve) hours.             lancets (ONETOUCH DELICA LANCETS) 33 gauge Misc  1 lancet by Misc.(Non-Drug; Combo Route) route once daily. ONCE DAILY BLOOD SUGAR TESTING; WHICHEVER BRAND COVERED BY INSURANCE             pen needle, diabetic 31 gauge x 1/4" Ndle  For mealtime insulin             phenytoin (DILANTIN) 100 MG ER capsule  Take 1 capsule (100 mg total) by mouth 3 (three) times daily.             tamsulosin (FLOMAX) 0.4 mg Cap  Take 2 capsules (0.8 mg total) by mouth once daily.                       _________________________________  Rosalva Manning MD  08/31/2020    "

## 2020-08-31 NOTE — SUBJECTIVE & OBJECTIVE
"Interval History: "Taking it day by day". Patient with C parapsilosis candidemia. Repeat blood cultures 8/28 NGTD thus far.    Review of Systems   Constitutional: Negative for chills, fatigue and fever.   HENT: Negative for ear pain, mouth sores, nosebleeds, postnasal drip, rhinorrhea, sinus pressure, sore throat, tinnitus, trouble swallowing and voice change.    Eyes: Negative for photophobia, pain, redness and visual disturbance.   Respiratory: Negative for apnea, cough, chest tightness, shortness of breath and wheezing.    Cardiovascular: Negative for chest pain, palpitations and leg swelling.   Gastrointestinal: Negative for abdominal pain, blood in stool, constipation, diarrhea, nausea and vomiting.   Endocrine: Negative for cold intolerance, heat intolerance, polydipsia and polyuria.   Genitourinary: Negative for decreased urine volume, difficulty urinating, discharge, dysuria, flank pain, frequency, genital sores, hematuria, penile pain, penile swelling, scrotal swelling, testicular pain and urgency.   Musculoskeletal: Negative for arthralgias, back pain, joint swelling, myalgias and neck pain.   Skin: Negative for color change and rash.   Allergic/Immunologic: Negative for environmental allergies and food allergies.   Neurological: Negative for dizziness, seizures, syncope, weakness, light-headedness, numbness and headaches.   Hematological: Negative for adenopathy. Does not bruise/bleed easily.   Psychiatric/Behavioral: Negative for agitation, confusion, decreased concentration, hallucinations, self-injury, sleep disturbance and suicidal ideas. The patient is not nervous/anxious.      Objective:     Vital Signs (Most Recent):  Temp: 98.2 °F (36.8 °C) (08/31/20 0829)  Pulse: 76 (08/31/20 0829)  Resp: 18 (08/31/20 0829)  BP: (!) 114/58 (08/31/20 0829)  SpO2: 99 % (08/31/20 0829) Vital Signs (24h Range):  Temp:  [97.7 °F (36.5 °C)-98.7 °F (37.1 °C)] 98.2 °F (36.8 °C)  Pulse:  [74-86] 76  Resp:  [17-18] " 18  SpO2:  [97 %-99 %] 99 %  BP: (114-181)/(58-80) 114/58     Weight: 98.3 kg (216 lb 11.4 oz)  Body mass index is 32.95 kg/m².    Estimated Creatinine Clearance: 17.6 mL/min (A) (based on SCr of 4.7 mg/dL (H)).    Physical Exam  Vitals signs reviewed.   Constitutional:       Appearance: He is well-developed.   HENT:      Head: Normocephalic and atraumatic.      Right Ear: External ear normal.      Left Ear: External ear normal.   Eyes:      Conjunctiva/sclera: Conjunctivae normal.   Neck:      Musculoskeletal: Normal range of motion and neck supple.      Thyroid: No thyromegaly.   Cardiovascular:      Rate and Rhythm: Normal rate and regular rhythm.      Heart sounds: Normal heart sounds. No murmur.   Pulmonary:      Effort: Pulmonary effort is normal.      Breath sounds: Normal breath sounds. No wheezing or rales.   Abdominal:      General: Bowel sounds are normal.      Palpations: Abdomen is soft. There is no mass.      Tenderness: There is no abdominal tenderness. There is no rebound.   Lymphadenopathy:      Cervical: No cervical adenopathy.   Skin:     General: Skin is warm and dry.   Neurological:      Mental Status: He is alert and oriented to person, place, and time.   Psychiatric:         Mood and Affect: Affect is angry.         Behavior: Behavior normal.         Significant Labs:   Blood Culture:   Recent Labs   Lab 08/25/20  0200 08/28/20  0803 08/28/20  0808   LABBLOO Gram stain kelsey bottle: budding yeast   08/26/2020  21:10    Results called to and read back by: JB BEAUCHAMP/JULIO  CANDIDA PARAPSILOSIS* No Growth to date  No Growth to date  No Growth to date  No Growth to date No Growth to date  No Growth to date  No Growth to date  No Growth to date     BMP:   Recent Labs   Lab 08/31/20  0607   *   *   K 4.0      CO2 22*   BUN 46*   CREATININE 4.7*   CALCIUM 7.5*   MG 2.0     CBC:   Recent Labs   Lab 08/30/20  0453 08/31/20  0607   WBC 6.63 7.27   HGB 7.5* 8.2*   HCT  23.3* 24.7*    219       Significant Imaging: I have reviewed all pertinent imaging results/findings within the past 24 hours.

## 2020-08-31 NOTE — NURSING
Patient returned to unit from scheduled procedure. Patient awake, alert and oriented. Patient without c/o discomfort. Dressing intact to the rt subclavian. Pt on a virtual visit with Dr. Manning.

## 2020-08-31 NOTE — PT/OT/SLP PROGRESS
Occupational Therapy      Patient Name:  Miguel Moore   MRN:  [id number]    Patient not seen today secondary to Unavailable x 2 attempts. Pt off unit to IR In AM and HD in PM. Will follow-up tomorrow.     GENEVA Mckenzie  8/31/2020

## 2020-08-31 NOTE — NURSING
Patient to scheduled procedure as ordered.  Patient awake alert and oriented. No apparent distress noted.

## 2020-08-31 NOTE — PROGRESS NOTES
Hospital Medicine / Virtual Medicine  Progress note      Team: [facility] [place] VIRTUAL TEAM 1 Rosalva Manning MD   Admit Date: 8/15/2020   Hospital Day: 15  GARY:    Code status: Full Code   Principal Problem: ESRD (end stage renal disease)     VIRTUAL TELENOTE    Chief complaint: ESRD  The patient location is:   Start time:12:15 pm  End time:  12:30 pm  Total time spent with patient: 15 mins    Present with the patient at the time of the telemed/virtual assessment: telepresenter   I have assessed findings virtually using a telemedicine platform and with assistance of the bedside nurse or telemedicine presenter.  The attending portion of this evaluation, treatment, and documentation was performed per Rosalva Manning MD via audiovisual.    HPI: 65 y.o. 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  States he was called on 8/13/2020, but did not come to the ED until today.  He reports mild edema and weight gain of 1-2 lb.  Denies fever, chills, cough, SOB, chest pain, palpitations, orthopnea, PND, dizziness, syncope, n/v/d, abd pain, or dysuria.  In the ED, Cr/BUN 5.3/64, K+ 5.4, H/H 7.5/22.7, CPK 1262, , UA shows proteinura 3+, renal US suggest chronic medical renal disease.  His Nephrologist is Dr. Roa, last visit Oct 2019.  Nephrology consult.  Placed in observation.     Overview/Hospital Course: 65 y.o. AAM with history significant for DMII, HTN, stroke, and CKD4 presents for evaluation of worsening CKD sent in by provider. Creatine = 2.0 1 year ago now with creatine 5.4 and potassium 5.8 worsening swelling and anasarca 2-3+ edema up to abdomen and flank, activity intolerance and orthopnea X 5 pillows. He is not on hemodialysis.  Amlodipine stopped.  Worsening BP and started on Hydralazine.  Nephrology consulted.  BP  improved, but worsening Creat with persistent hyperkalemia.  Patient will need HD.  IR consulted for HD catheter.  Temporary HD catheter placed on 8/20 and patient underwent HD.  Catheter switched to tunneled catheter on 8/21.  Patient with right sided hemiplegia from previous stroke.  Deconditioned and PT/OT consulted.  Initial recommendation for SNF.  Patient will need outpatient HD arrangements and SW consulted.  Patient started spiking fevers overnight 8/25.  Unremarkable CXR.  BCx and UA obtained.    Interval hx:   Pt was seen and examined at bedside. Pt had no acute events overnight, and no new complaints this morning. Pt remained hemodynamically stable and afebrile. Pt has been tolerating PO intake well without any nausea, vomiting, diarrhea, constipation, blood in stools or trouble urinating. Pt denies any fevers, chills, malaise, headaches, chest pain, SOB, cough. permacath placed today. HD today.     ROS (Positive in Bold, otherwise negative)  Constitutional: fever, chills, night sweats  CV: chest pain, edema, palpitations  Resp: SOB, cough, sputum production  GI: changes in appetite, NVDC, pain, melena, hematochezia, GERD, hematemesis  : Dysuria, hematuria, urinary urgency, frequency  MSK: arthralgia/myalgia, joint swelling  Neuro/Psych: anxiety, depression    PEx   Temp:  [97.7 °F (36.5 °C)-98.7 °F (37.1 °C)]   Pulse:  [72-89]   Resp:  [17-20]   BP: (114-181)/(55-80)   SpO2:  [97 %-100 %]      I & O (Last 24H):   No intake or output data in the 24 hours ending 08/31/20 1629    General:  male  in no acute distress. Nontoxic. Resting in bed. Cooperative.  HEENT: NCAT. PERRL. EOMI. Sclera Anicteric.  CVS: RRR. Normal S1 S2. No murmurs  Pulm: CTAB. Normal respiratory effort. No wheezes, rhonchi, or crackles.  Abdomen: Soft. Non-distended. No tenderness to palpation. No rebound or guarding. +BS.  Extremities: No edema. No cyanosis. Full ROM.  Neuro: Alert, oriented x 4, Spont mvt of all  extremities with no focal deficits noted.    Recent Results (from the past 24 hour(s))   POCT glucose    Collection Time: 08/30/20  4:42 PM   Result Value Ref Range    POCT Glucose 198 (H) 70 - 110 mg/dL   POCT glucose    Collection Time: 08/30/20  8:21 PM   Result Value Ref Range    POCT Glucose 191 (H) 70 - 110 mg/dL   CBC auto differential    Collection Time: 08/31/20  6:07 AM   Result Value Ref Range    WBC 7.27 3.90 - 12.70 K/uL    RBC 3.29 (L) 4.60 - 6.20 M/uL    Hemoglobin 8.2 (L) 14.0 - 18.0 g/dL    Hematocrit 24.7 (L) 40.0 - 54.0 %    Mean Corpuscular Volume 75 (L) 82 - 98 fL    Mean Corpuscular Hemoglobin 24.9 (L) 27.0 - 31.0 pg    Mean Corpuscular Hemoglobin Conc 33.2 32.0 - 36.0 g/dL    RDW 13.3 11.5 - 14.5 %    Platelets 219 150 - 350 K/uL    MPV 9.7 9.2 - 12.9 fL    Immature Granulocytes 0.6 (H) 0.0 - 0.5 %    Gran # (ANC) 5.6 1.8 - 7.7 K/uL    Immature Grans (Abs) 0.04 0.00 - 0.04 K/uL    Lymph # 0.9 (L) 1.0 - 4.8 K/uL    Mono # 0.6 0.3 - 1.0 K/uL    Eos # 0.2 0.0 - 0.5 K/uL    Baso # 0.04 0.00 - 0.20 K/uL    nRBC 0 0 /100 WBC    Gran% 76.5 (H) 38.0 - 73.0 %    Lymph% 11.8 (L) 18.0 - 48.0 %    Mono% 7.7 4.0 - 15.0 %    Eosinophil% 2.8 0.0 - 8.0 %    Basophil% 0.6 0.0 - 1.9 %    Differential Method Automated    Comprehensive metabolic panel    Collection Time: 08/31/20  6:07 AM   Result Value Ref Range    Sodium 134 (L) 136 - 145 mmol/L    Potassium 4.0 3.5 - 5.1 mmol/L    Chloride 101 95 - 110 mmol/L    CO2 22 (L) 23 - 29 mmol/L    Glucose 152 (H) 70 - 110 mg/dL    BUN, Bld 46 (H) 8 - 23 mg/dL    Creatinine 4.7 (H) 0.5 - 1.4 mg/dL    Calcium 7.5 (L) 8.7 - 10.5 mg/dL    Total Protein 5.1 (L) 6.0 - 8.4 g/dL    Albumin 1.9 (L) 3.5 - 5.2 g/dL    Total Bilirubin 0.1 0.1 - 1.0 mg/dL    Alkaline Phosphatase 88 55 - 135 U/L    AST 14 10 - 40 U/L    ALT 22 10 - 44 U/L    Anion Gap 11 8 - 16 mmol/L    eGFR if African American 14 (A) >60 mL/min/1.73 m^2    eGFR if non African American 12 (A) >60 mL/min/1.73 m^2    Magnesium    Collection Time: 08/31/20  6:07 AM   Result Value Ref Range    Magnesium 2.0 1.6 - 2.6 mg/dL   Phosphorus    Collection Time: 08/31/20  6:07 AM   Result Value Ref Range    Phosphorus 2.7 2.7 - 4.5 mg/dL   Protime-INR    Collection Time: 08/31/20  8:05 AM   Result Value Ref Range    Prothrombin Time 10.7 9.0 - 12.5 sec    INR 1.0 0.8 - 1.2   POCT glucose    Collection Time: 08/31/20  8:26 AM   Result Value Ref Range    POCT Glucose 154 (H) 70 - 110 mg/dL       Recent Labs   Lab 08/29/20  1947 08/30/20  0752 08/30/20  1123 08/30/20  1642 08/30/20 2021 08/31/20  0826   POCTGLUCOSE 219* 155* 181* 198* 191* 154*       Hemoglobin A1C   Date Value Ref Range Status   08/13/2020 5.5 4.0 - 5.6 % Final     Comment:     ADA Screening Guidelines:  5.7-6.4%  Consistent with prediabetes  >or=6.5%  Consistent with diabetes  High levels of fetal hemoglobin interfere with the HbA1C  assay. Heterozygous hemoglobin variants (HbS, HgC, etc)do  not significantly interfere with this assay.   However, presence of multiple variants may affect accuracy.     01/17/2020 6.0 (H) 4.0 - 5.6 % Final     Comment:     ADA Screening Guidelines:  5.7-6.4%  Consistent with prediabetes  >or=6.5%  Consistent with diabetes  High levels of fetal hemoglobin interfere with the HbA1C  assay. Heterozygous hemoglobin variants (HbS, HgC, etc)do  not significantly interfere with this assay.   However, presence of multiple variants may affect accuracy.     09/09/2019 6.1 (H) 4.0 - 5.6 % Final     Comment:     ADA Screening Guidelines:  5.7-6.4%  Consistent with prediabetes  >or=6.5%  Consistent with diabetes  High levels of fetal hemoglobin interfere with the HbA1C  assay. Heterozygous hemoglobin variants (HbS, HgC, etc)do  not significantly interfere with this assay.   However, presence of multiple variants may affect accuracy.          Active Hospital Problems    Diagnosis  POA    *ESRD (end stage renal disease) [N18.6]  Yes     2/27/20 renal US  with dopplers no ALF.  Medical renal disease  8/2020 renal US: 1. Symmetric diffusely increased cortical echogenicity and elevated resistive indices, nonspecific indicators of chronic medical renal disease.  2. Prostatomegaly.  Some of the provided images are suggestive of a distinct hyperechoic component of the prostate gland, of uncertain etiology or significance, and potentially artifactual.  Dedicated prostate ultrasound or MRI may be of benefit for further characterization.     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      Seizure disorder as sequela of cerebrovascular accident [I69.398, G40.909]  Not Applicable     Chronic    Hemiplegia and hemiparesis following cerebral infarction affecting right dominant side [I69.351]  Not Applicable     Chronic    Pure hypercholesterolemia [E78.00]  Yes     Chronic      Resolved Hospital Problems    Diagnosis Date Resolved POA    Candidemia [B37.7] 08/28/2020 No    Fungemia [B49] 08/27/2020 No    Fungemia [B49] 08/28/2020 No    Hyperkalemia [E87.5] 08/21/2020 Yes    Non-traumatic rhabdomyolysis [M62.82] 08/21/2020 Yes    Type 2 diabetes mellitus with diabetic neuropathy, with long-term current use of insulin [E11.40, Z79.4] 08/26/2020 Not Applicable          Assessment and Plan for problems addressed today:      atorvastatin  80 mg Oral Daily    calcitRIOL  0.25 mcg Oral Daily    clopidogreL  75 mg Oral Daily    epoetin marisol-epbx  10,000  Units Subcutaneous Every Tues, Thurs, Sat    ferrous gluconate  324 mg Oral BID WM    fluconazole  200 mg Oral QHS    folic acid  1 mg Oral Daily    hydrALAZINE  25 mg Oral Q8H    labetaloL  100 mg Oral Q12H    lactulose  30 g Oral Once    phenytoin  100 mg Oral TID    tamsulosin  2 capsule Oral Daily    trazodone  50 mg Oral QHS     acetaminophen, dextrose 50%, dextrose 50%, diazePAM, glucagon (human recombinant), glucose, glucose, hydrALAZINE, insulin aspart U-100, oxyCODONE-acetaminophen, senna-docusate 8.6-50 mg, sodium chloride 0.9%, sodium chloride 0.9%    ESRD (end stage renal disease)  -Admitted to inpatient status  -On admit had anasarca up to abdomen including significant penile and scrotal edema.    -Previously had CKD4 with baseline Cr of 3 but was 5.8 on admit.    -Noted nephrotic proteinuria  -Continue strict ins/outs and daily weights  -Dr Menard and Dr. Tamez on board and input appreciated  -Continue diuresis with lasix  -Continue dialysis per nephrology  -Outpatient HD has been arranged  -Had planned discharge 8/27 if remained afebrile and cultures negative.  Unfortunately his blood culture grew Candida parapsilosis  -Dr. Gomez with ID consulted and case discussed with him.  Started micafungin 8/27 and now on fluconazole.  -Dialysis line removed after HD 8/28 and plan line holiday over weekend.  -new line by IR on Monday.     Anasarca  -Treatment as above     Nephrotic syndrome  -Treatment as above     Candida parapsilosis infection  -Found to have candidemia - source unclear  -Started on micafungin and now converted to fluconazole  -Repeat cultures negative so far  -Dialysis line removed 8/28.  Plan to replace line Monday  -Appreciate input from Dr. Gomez.     Hypocalcemia  -Corrected calcium is 8.9  -Continue calcitrol 0.25 mcg and calcium acetate 667 po tid AC     Anemia  -Anemia of CKD  -No evidence of bleeding.  -Hb stable above transfusion threshold x 3 days  -He is folate deficient.   Started replacement 8/29  -Borderline iron deficient.  Started replacement 8/29  -Repeat cbc in AM      Enlarged prostate  -Continue home flomax - voiding well - strict I&Os     Hemiplegia and hemiparesis following cerebral infarction affecting right dominant side  -Stable, no acute issue, continue plavix/statin.  -PT/OT evaluation.  -Anticipate discharge to SNF when medically stable     Seizure disorder as sequela of cerebrovascular accident  -Continue home regimen of phenytoin  -No seizures.       Benign non-nodular prostatic hyperplasia without lower urinary tract symptoms  -Continue home tamsulosin      Controlled type 2 diabetes mellitus with stage 4 chronic kidney disease, with long-term current use of insulin  -HgbA1c 5.8  -Hold oral antihyperglycemics while inpatient  -PRN sliding scale insulin  -ACHS glucose monitoring   -ADA diet      Pure hypercholesterolemia  -Continue statin     Benign essential HTN; ACEI hyperK  -Generally well managed but higher this morning  -Norvasc stopped due to lower extremity edema.  -Continue hydralazine and labetalol  -If elevations persist consider nifedipine.    DVT PPx: SCDs    Discharge plan and follow up: DC SNF once medically stable     Rosalva Manning MD  Hospital Medicine Staff

## 2020-08-31 NOTE — PT/OT/SLP PROGRESS
Physical Therapy      Patient Name:  Miguel Moore   MRN:  [id number]    1st Attempt 1159: Patient not seen today secondary to (pt off floor for [facility] with dialysis to follow). Will follow-up as able.    2nd Attempt 1545: Pt reports he will be leaving soon for HD. Will follow up tomorrow.    Ly Díaz, PTA

## 2020-09-01 VITALS
DIASTOLIC BLOOD PRESSURE: 62 MMHG | RESPIRATION RATE: 18 BRPM | TEMPERATURE: 97 F | HEART RATE: 79 BPM | WEIGHT: 214.06 LBS | BODY MASS INDEX: 32.44 KG/M2 | OXYGEN SATURATION: 98 % | SYSTOLIC BLOOD PRESSURE: 129 MMHG | HEIGHT: 68 IN

## 2020-09-01 LAB
ALBUMIN SERPL BCP-MCNC: 1.8 G/DL (ref 3.5–5.2)
ALP SERPL-CCNC: 90 U/L (ref 55–135)
ALT SERPL W/O P-5'-P-CCNC: 16 U/L (ref 10–44)
ANION GAP SERPL CALC-SCNC: 7 MMOL/L (ref 8–16)
AST SERPL-CCNC: 11 U/L (ref 10–40)
BASOPHILS # BLD AUTO: 0.03 K/UL (ref 0–0.2)
BASOPHILS NFR BLD: 0.5 % (ref 0–1.9)
BILIRUB SERPL-MCNC: 0.2 MG/DL (ref 0.1–1)
BUN SERPL-MCNC: 27 MG/DL (ref 8–23)
CALCIUM SERPL-MCNC: 7.3 MG/DL (ref 8.7–10.5)
CHLORIDE SERPL-SCNC: 103 MMOL/L (ref 95–110)
CO2 SERPL-SCNC: 24 MMOL/L (ref 23–29)
CREAT SERPL-MCNC: 3.4 MG/DL (ref 0.5–1.4)
DIFFERENTIAL METHOD: ABNORMAL
EOSINOPHIL # BLD AUTO: 0.1 K/UL (ref 0–0.5)
EOSINOPHIL NFR BLD: 2.3 % (ref 0–8)
ERYTHROCYTE [DISTWIDTH] IN BLOOD BY AUTOMATED COUNT: 13.9 % (ref 11.5–14.5)
EST. GFR  (AFRICAN AMERICAN): 21 ML/MIN/1.73 M^2
EST. GFR  (NON AFRICAN AMERICAN): 18 ML/MIN/1.73 M^2
GLUCOSE SERPL-MCNC: 148 MG/DL (ref 70–110)
HCT VFR BLD AUTO: 23.8 % (ref 40–54)
HGB BLD-MCNC: 7.9 G/DL (ref 14–18)
IMM GRANULOCYTES # BLD AUTO: 0.07 K/UL (ref 0–0.04)
IMM GRANULOCYTES NFR BLD AUTO: 1.1 % (ref 0–0.5)
LYMPHOCYTES # BLD AUTO: 0.7 K/UL (ref 1–4.8)
LYMPHOCYTES NFR BLD: 12 % (ref 18–48)
MAGNESIUM SERPL-MCNC: 1.9 MG/DL (ref 1.6–2.6)
MCH RBC QN AUTO: 25.5 PG (ref 27–31)
MCHC RBC AUTO-ENTMCNC: 33.2 G/DL (ref 32–36)
MCV RBC AUTO: 77 FL (ref 82–98)
MONOCYTES # BLD AUTO: 0.5 K/UL (ref 0.3–1)
MONOCYTES NFR BLD: 8.8 % (ref 4–15)
NEUTROPHILS # BLD AUTO: 4.6 K/UL (ref 1.8–7.7)
NEUTROPHILS NFR BLD: 75.3 % (ref 38–73)
NRBC BLD-RTO: 0 /100 WBC
PHOSPHATE SERPL-MCNC: 2.4 MG/DL (ref 2.7–4.5)
PLATELET # BLD AUTO: 194 K/UL (ref 150–350)
PMV BLD AUTO: 10.4 FL (ref 9.2–12.9)
POCT GLUCOSE: 139 MG/DL (ref 70–110)
POCT GLUCOSE: 156 MG/DL (ref 70–110)
POCT GLUCOSE: 182 MG/DL (ref 70–110)
POTASSIUM SERPL-SCNC: 3.9 MMOL/L (ref 3.5–5.1)
PROT SERPL-MCNC: 5 G/DL (ref 6–8.4)
RBC # BLD AUTO: 3.1 M/UL (ref 4.6–6.2)
SODIUM SERPL-SCNC: 134 MMOL/L (ref 136–145)
WBC # BLD AUTO: 6.16 K/UL (ref 3.9–12.7)

## 2020-09-01 PROCEDURE — 99239 PR HOSPITAL DISCHARGE DAY,>30 MIN: ICD-10-PCS | Mod: ,,, | Performed by: HOSPITALIST

## 2020-09-01 PROCEDURE — 97803 MED NUTRITION INDIV SUBSEQ: CPT

## 2020-09-01 PROCEDURE — 25000003 PHARM REV CODE 250: Performed by: HOSPITALIST

## 2020-09-01 PROCEDURE — 94761 N-INVAS EAR/PLS OXIMETRY MLT: CPT

## 2020-09-01 PROCEDURE — 25000003 PHARM REV CODE 250: Performed by: INTERNAL MEDICINE

## 2020-09-01 PROCEDURE — 97530 THERAPEUTIC ACTIVITIES: CPT

## 2020-09-01 PROCEDURE — 84100 ASSAY OF PHOSPHORUS: CPT

## 2020-09-01 PROCEDURE — 83735 ASSAY OF MAGNESIUM: CPT

## 2020-09-01 PROCEDURE — 97110 THERAPEUTIC EXERCISES: CPT

## 2020-09-01 PROCEDURE — 36415 COLL VENOUS BLD VENIPUNCTURE: CPT

## 2020-09-01 PROCEDURE — 80053 COMPREHEN METABOLIC PANEL: CPT

## 2020-09-01 PROCEDURE — 85025 COMPLETE CBC W/AUTO DIFF WBC: CPT

## 2020-09-01 PROCEDURE — 63600175 PHARM REV CODE 636 W HCPCS: Performed by: INTERNAL MEDICINE

## 2020-09-01 PROCEDURE — 99239 HOSP IP/OBS DSCHRG MGMT >30: CPT | Mod: ,,, | Performed by: HOSPITALIST

## 2020-09-01 RX ADMIN — PHENYTOIN SODIUM 100 MG: 100 CAPSULE ORAL at 03:09

## 2020-09-01 RX ADMIN — LABETALOL HYDROCHLORIDE 100 MG: 100 TABLET, FILM COATED ORAL at 10:09

## 2020-09-01 RX ADMIN — FERROUS GLUCONATE TAB 324 MG (37.5 MG ELEMENTAL IRON) 324 MG: 324 (37.5 FE) TAB at 05:09

## 2020-09-01 RX ADMIN — TAMSULOSIN HYDROCHLORIDE 0.8 MG: 0.4 CAPSULE ORAL at 10:09

## 2020-09-01 RX ADMIN — FOLIC ACID 1 MG: 1 TABLET ORAL at 10:09

## 2020-09-01 RX ADMIN — DIAZEPAM 2 MG: 2 TABLET ORAL at 05:09

## 2020-09-01 RX ADMIN — HYDRALAZINE HYDROCHLORIDE 25 MG: 25 TABLET, FILM COATED ORAL at 03:09

## 2020-09-01 RX ADMIN — PHENYTOIN SODIUM 100 MG: 100 CAPSULE ORAL at 10:09

## 2020-09-01 RX ADMIN — ATORVASTATIN CALCIUM 80 MG: 40 TABLET, FILM COATED ORAL at 10:09

## 2020-09-01 RX ADMIN — CLOPIDOGREL 75 MG: 75 TABLET, FILM COATED ORAL at 11:09

## 2020-09-01 RX ADMIN — EPOETIN ALFA-EPBX 10000 UNITS: 10000 INJECTION, SOLUTION INTRAVENOUS; SUBCUTANEOUS at 11:09

## 2020-09-01 RX ADMIN — CALCITRIOL CAPSULES 0.25 MCG 0.25 MCG: 0.25 CAPSULE ORAL at 10:09

## 2020-09-01 NOTE — PT/OT/SLP PROGRESS
Occupational Therapy   Treatment    Name: Miguel Moore  MRN: [id number]  Admitting Diagnosis:  ESRD (end stage renal disease)  15 Days Post-Op    Recommendations:     Discharge Recommendations: nursing facility, skilled  Discharge Equipment Recommendations:  bedside commode  Barriers to discharge:  (decreased mobility; increased assist required for all aspects of care; high risk for falls, unplanned readmission, and morbidity if d/c home)    Assessment:     Miguel Moore is a 65 y.o. male with a medical diagnosis of ESRD (end stage renal disease). Performance deficits affecting function are weakness, abnormal tone, impaired endurance, decreased ROM, decreased coordination, impaired self care skills, decreased upper extremity function, impaired fine motor, impaired functional mobilty, decreased lower extremity function, gait instability, decreased safety awareness, edema, impaired balance, pain.     Pt stood 2x at EOB with MAX A x2 with str ; MAX A x2 for sidesteps at EOB. Pt had a good session. POC updated- goals remain appropriate.    Rehab Prognosis:  Fair +; patient would benefit from acute skilled OT services to address these deficits and reach maximum level of function.       Plan:     Patient to be seen (5-6x/wk) to address the above listed problems via self-care/home management, therapeutic activities, therapeutic exercises  · Plan of Care Expires: 09/15/20  · Plan of Care Reviewed with: patient    Subjective     Chief complaint: none reported   Patient's comments/goals: quiet during session, but thankful and happy to have gotten out of bed     Pain/Comfort:  · Pain Rating 1: (Pt with no pain at rest, c/o RLE pain with ROM 2* edema)  · Pain Addressed 1: Reposition    Objective:     Patient found HOB elevated with BLE elevated with bed alarm, peripheral IV, telemetry(Avasys, HD cath) upon OT entry to room.    General Precautions: Standard, fall, diabetic   Orthopedic Precautions:N/A   Braces: N/A      Occupational Performance:     Bed Mobility:    · Patient completed Scooting anteriorly at EOB with minimum assistance; scooting to HOB in supine with dependent and 2 persons  · Patient completed Supine to Sit with moderate assistance, 2 persons, with side rail and HOB elevated  · Patient completed Sit to Supine with dependent and 2 persons     Functional Mobility/Transfers:  · Patient completed Sit <> Stand Transfer with maximal assistance and of 2 persons  with  SBQC   · Functional Mobility: Pt stood first time with BLE knees blocked and SBAC in LUE and MAX A x2- verbal cueing for upright posture. Pt completed weight-shifting L<>R with MOD A x2. Pt completed static standing then requested seated rest break. After LUE exercises at EOB, pt stood again with same assist required and took sidesteps at EOB with MOD A x2 with verbal cueing for sequencing of BLE and cane.     Activities of Daily Living:  · NT this session      [facility] 6 Click ADL: 13    Treatment & Education:  · Pt re-educated on OT role/POC.   · Safety during functional t/f and all aspects of mobility   · White board updated: level 1 still appropriate   · Gentle stretch to RUE minimally: digits to extension, wrist ext, forearm supination, elbow flexion , shoulder ROM, external rotation; moderate edema to RUE  · Seated EOB in unsupported sit, pt completed the following LUE exercises, 1x15:   · Shoulder flex/ext   · Forward small circles with LUE in shoulder horizontal abduction then 15 backwards direction  · 1x10 anterior reach for small ADL object in all planes promoting trunk engagement and increased anterior lean (CGA required)   · Multiple self-care tasks/functional mobility completed- assistance level noted above   · All questions/concerns answered within OT scope of practice       Patient left HOB elevated with RUE elevated and BLE elevated with all lines intact, call button in reach, bed alarm on, Avasys present and all needs met/within  reachEducation:      GOALS:   Multidisciplinary Problems     Occupational Therapy Goals        Problem: Occupational Therapy Goal    Goal Priority Disciplines Outcome Interventions   Occupational Therapy Goal     OT, PT/OT Ongoing, Progressing    Description: Goals to be met by: 09/02/20     Patient will increase functional independence with ADLs by performing:    Feeding with Modified Ora.  UE Dressing with Modified Ora.  LE Dressing with Minimal Assistance.  Grooming while seated with Modified Ora.  Toileting from bedside commode with Stand-by Assistance for hygiene and clothing management.   Rolling to Bilateral with Supervision.   Supine to sit with Supervision.  Step transfer with Stand-by Assistance  Stand pivot transfer with Stand-by Assistance  Bed>wheelchair transfer with Stand-by assistance   Pt will manually propel w/c household distance with MOD I.   Toilet transfer to bedside commode with Stand-by Assistance.  Upper extremity exercise program x15 reps per handout, with independence.                     Time Tracking:     OT Date of Treatment: 09/01/20  OT Start Time: 1059  OT Stop Time: 1130  OT Total Time (min): 31 min    Billable Minutes:Therapeutic Exercise 16 min  Total Time 31 min (co-treat with PT)    Minerva Pleitez OT  9/1/2020

## 2020-09-01 NOTE — PLAN OF CARE
Follow-up Information     Day Currie MD. Go on 9/14/2020.    Specialty: Infectious Diseases  Why: at 10 am.   Contact information:  [address]  [facility] [id number] [phone]             Janie Guerrero MD On 9/8/2020.    Specialty: Ophthalmology  Why: Opthomology appointment scheduled September 8th @ 1:00 at [facility]; to look for fungal infection in the eye. Of extreme importance  Contact information:  [address]  [address]  [facility] [address] [phone]             Raciel Raymond MD.    Specialty: Internal Medicine  Why: Please assist patient in making a PCP follow up appointment once discharged from [facility]  Contact information:  [address] [place] [address] [phone]

## 2020-09-01 NOTE — PROGRESS NOTES
"[facility]  Adult Nutrition  Progress Note    SUMMARY       Recommendations    1. Continue diabetic, renal diet, encourage PO intake.   2. Weigh pt 3x/week after HD    Goals: 1. Consume >/=75% of meals by discharge  Nutrition Goal Status: goal met  Communication of RD Recs: (plan of care)    Reason for Assessment    Reason For Assessment: RD follow-up  Diagnosis: renal disease(Nephrotic syndrome)  Relevant Medical History: stroke, HTN, T2DM, nuclear sclerosis, UTI  Interdisciplinary Rounds: did not attend    General Information Comments: Pt on side of bed working with PT at visit. Pt reports having a good appetite, eating well. Meal intake 100%. Pt denies any GI distress or chewing/swallowing difficulty. Last BM 8/31. Pt recieves HD qMWF. NFPE not warrented at this time. Pt likely to d/c today.    Nutrition Discharge Planning: Adequate intake on diabetic/renal diet    Nutrition Risk Screen    Nutrition Risk Screen: no indicators present    Nutrition/Diet History    Patient Reported Diet/Restrictions/Preferences: heart healthy, diabetic diet  Spiritual, Cultural Beliefs, Catholic Practices, Values that Affect Care: no  Food Allergies: NKFA  Factors Affecting Nutritional Intake: None identified at this time    Anthropometrics    Temp: 97.6 °F (36.4 °C)  Height Method: Stated  Height: 5' 8" (172.7 cm)  Height (inches): 68 in  Weight Method: Bed Scale  Weight: 97.1 kg (214 lb 1.1 oz)  Weight (lb): 214.07 lb  Ideal Body Weight (IBW), Male: 154 lb  % Ideal Body Weight, Male (lb): 142.58 %  BMI (Calculated): 32.6  BMI Grade: 30 - 34.9- obesity - grade I  Weight Loss: (none prior to admit)     Lab/Procedures/Meds    Pertinent Labs Reviewed: reviewed  Pertinent Labs Comments: H/H 7.9/23.8, Na 134, BUN 27, Creat 3.4, GFR 21, Glu 148, Ca 7.3, Phos 2.4, Pro tot 5, Alb 1.8  Pertinent Medications Reviewed: reviewed  Pertinent Medications Comments: calcitriol, epoetin w/ HD, ferrous gluconate, folic acid, " hydralazine    Estimated/Assessed Needs    Weight Used For Calorie Calculations: 97.1 kg (214 lb 1.1 oz)  Energy Calorie Requirements (kcal): 1725 kcal  Energy Need Method: Sharkey-St Jeor  Protein Requirements: 116 g (1.2 g/kg)  Weight Used For Protein Calculations: 97.1 kg (214 lb 1.1 oz)  Fluid Requirements (mL): 1 ml/kcal or per MD  Estimated Fluid Requirement Method: RDA Method  RDA Method (mL): 1725  CHO Requirement: 215 g daily    Nutrition Prescription Ordered    Current Diet Order: diabetic/renal    Evaluation of Received Nutrient/Fluid Intake    I/O: 680/3500  Energy Calories Required: meeting needs  Protein Required: meeting needs  Fluid Required: meeting needs  Comments: Last BM 8/31  Tolerance: tolerating  % Intake of Estimated Energy Needs: 75 - 100 %  % Meal Intake: 75 - 100 %    Nutrition Risk    Level of Risk/Frequency of Follow-up: low(1x/week)     Assessment and Plan  Nutrition Problem  Increased protein needs     Related to (etiology):   Receiving HD qMWF     Signs and Symptoms (as evidenced by):   Pt needs 1.2 g/kg of protein daily     Interventions (treatment strategy):  Carbohydrate modified diet  Nutrient modified diet- renal  Collaboration with other providers     Nutrition Diagnosis Status:   Continues    Monitor and Evaluation    Food and Nutrient Intake: energy intake, food and beverage intake  Food and Nutrient Adminstration: diet order  Knowledge/Beliefs/Attitudes: food and nutrition knowledge/skill  Physical Activity and Function: nutrition-related ADLs and IADLs  Anthropometric Measurements: weight, weight change  Biochemical Data, Medical Tests and Procedures: electrolyte and renal panel, glucose/endocrine profile  Nutrition-Focused Physical Findings: overall appearance, skin     Malnutrition Assessment  Malnutrition Type: (NFPE not warrented at this time)  Energy Intake:   8/15-8/25: meal intake 50-75%   8/25-9/01: meal intake 100%    Skin (Micronutrient): (lashon score-15)  Tongue  (Micronutrient): (dental appliance present)       Weight Loss (Malnutrition):   8/25/20 97.8 kg   9/01/20 97.1 kg)           Edema (Fluid Accumulation): 3-->moderate(+4 scrotum)   Subcutaneous Fat Loss (Final Summary): well nourished  Muscle Loss Evaluation (Final Summary): well nourished         Nutrition Follow-Up    RD Follow-up?: Yes

## 2020-09-01 NOTE — PLAN OF CARE
TN received notice from [facility] that they can no longer accept patient at this time; TN sent additional referrals via ; pt has been [facility]; TN in to speak to patient; is in agreement to go to [facility]; all clinicals forwarded via ; pending review    TN also contacted [facility] admissions center; confirmed that patient's first outpt HD treatment is scheduled for 9/2/20 @ 11:45; information forwarded to [facility] via     Per MD, patient will need Opthalmology follow up scheduled before 9/10; TN secured appointment with Dr Guerrero for 9/8/20 @ [facility]; appointment sheet forwarded to [facility] via     Pending call report information        09/01/20 1507   Post-Acute Status   Post-Acute Authorization Placement   Post-Acute Placement Status Referrals Sent   Diaylsis Status Set-up Complete  ([facility])   Discharge Delays None known at this time   Discharge Plan   Discharge Plan A Skilled Nursing Facility

## 2020-09-01 NOTE — BRIEF OP NOTE
Radiology Post-Procedure Note    Pre Op Diagnosis: AoCKI requiring long-term access for HD  Post Op Diagnosis: Same    Procedure: US and fluoroscopic-guided placement of a 19.0-cm RIJV-approach THDC    Procedure performed by: Eran Ravi MD    Written Informed Consent Obtained: Yes  Specimen Removed: NO  Estimated Blood Loss: Minimal    Findings:   Successful US and fluoroscopic-guided placement of a 19.0-cm RIJV-approach THDC under moderate conscious sedation. Patient tolerated the procedure well. No immediate post-procedural complications noted.     Thank you for considering IR for the care of your patient.     Eran Ravi MD  Interventional Radiology

## 2020-09-01 NOTE — DISCHARGE SUMMARY
Discharge Summary  Hospital Medicine       Name: Miguel Moore  YOB: 1955 (Age: 65 y.o.)  Date of Admission: 8/15/2020  Date of Discharge: 9/1/2020  Attending Provider on Discharge: Rosalva Manning MD  [facility] Medicine Team: [facility] VIRTUAL TEAM 1  Code status: Full Code    Primary Care Provider: Raciel Raymond MD    Discharge Diagnosis:  Active Hospital Problems    Diagnosis  POA    *ESRD (end stage renal disease) [N18.6]  Yes     2/27/20 renal US with dopplers no ALF.  Medical renal disease  8/2020 renal US: 1. Symmetric diffusely increased cortical echogenicity and elevated resistive indices, nonspecific indicators of chronic medical renal disease.  2. Prostatomegaly.  Some of the provided images are suggestive of a distinct hyperechoic component of the prostate gland, of uncertain etiology or significance, and potentially artifactual.  Dedicated prostate ultrasound or MRI may be of benefit for further characterization.     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      Seizure disorder as sequela of cerebrovascular accident [I69.398, G40.909]  Not Applicable     Chronic    Hemiplegia and hemiparesis following cerebral infarction affecting right dominant side [I69.351]  Not Applicable     Chronic    Pure hypercholesterolemia [E78.00]  Yes     Chronic      Resolved Hospital Problems    Diagnosis Date Resolved POA    Candidemia [B37.7] 08/28/2020 No    Fungemia [B49]  08/27/2020 No    Fungemia [B49] 08/28/2020 No    Hyperkalemia [E87.5] 08/21/2020 Yes    Non-traumatic rhabdomyolysis [M62.82] 08/21/2020 Yes    Type 2 diabetes mellitus with diabetic neuropathy, with long-term current use of insulin [E11.40, Z79.4] 08/26/2020 Not Applicable       HPI: 65 y.o. 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  States he was called on 8/13/2020, but did not come to the ED until today.  He reports mild edema and weight gain of 1-2 lb.  Denies fever, chills, cough, SOB, chest pain, palpitations, orthopnea, PND, dizziness, syncope, n/v/d, abd pain, or dysuria.  In the ED, Cr/BUN 5.3/64, K+ 5.4, H/H 7.5/22.7, CPK 1262, , UA shows proteinura 3+, renal US suggest chronic medical renal disease.  His Nephrologist is Dr. Roa, last visit Oct 2019.  Nephrology consult.  Placed in observation.     Overview/Hospital Course: 65 y.o. AAM with history significant for DMII, HTN, stroke, and CKD4 presents for evaluation of worsening CKD sent in by provider. Creatine = 2.0 1 year ago now with creatine 5.4 and potassium 5.8 worsening swelling and anasarca 2-3+ edema up to abdomen and flank, activity intolerance and orthopnea X 5 pillows. He is not on hemodialysis.  Amlodipine stopped.  Worsening BP and started on Hydralazine.  Nephrology consulted.  BP improved, but worsening Creat with persistent hyperkalemia.  Patient will need HD.  IR consulted for HD catheter.  Temporary HD catheter placed on 8/20 and patient underwent HD.  Catheter switched to tunneled catheter on 8/21.  Patient with right sided hemiplegia from previous stroke.  Deconditioned and PT/OT consulted.  Initial recommendation for SNF.  Patient will need outpatient HD arrangements and SW consulted.  Patient started spiking fevers overnight  8/25.  Unremarkable CXR.  BCx and UA obtained.     Hospital Course:   ESRD (end stage renal disease)  -Admitted to inpatient status  -On admit had anasarca up to abdomen including significant penile and scrotal edema.    -Previously had CKD4 with baseline Cr of 3 but was 5.8 on admit.    -Noted nephrotic proteinuria  -Continue strict ins/outs and daily weights  -Dr Menard and Dr. Tamez on board and input appreciated  -Continue diuresis with lasix  -Continue dialysis per nephrology  -Outpatient HD has been arranged  -Had planned discharge 8/27 if remained afebrile and cultures negative.  Unfortunately his blood culture grew Candida parapsilosis  -Dr. Gomez with ID consulted and case discussed with him.  Started micafungin 8/27 and now on fluconazole.  -Dialysis line removed after HD 8/28 and plan line holiday over weekend.  -new line by IR on 8/31, tolerated dialysis fluid.  -continue outpatient dialysis.     Anasarca  -Treatment as above     Nephrotic syndrome  -Treatment as above     Candida parapsilosis infection  -Found to have candidemia - source unclear  -infectious disease consulted, Started on micafungin and now converted to fluconazole  -Repeat cultures negative so far  -Dialysis line removed 8/28.   line replaced on 08/31 after line holiday.  -end date of fluconazole will be 09/11/2020.  Patient has ophthalmology follow-up scheduled on 09/08/2020. Tentative end date 9/11/2020 If no endophthalmitis. If noted to have endophthalmitis during optho visit, would need at least 4-6 weeks of treatment     Hypocalcemia  -Corrected calcium is 8.9  -Continue calcitrol 0.25 mcg and calcium acetate 667 po tid AC     Anemia  -Anemia of CKD  -No evidence of bleeding.  -Hb stable above transfusion threshold x 3 days  -He is folate deficient.  Started replacement 8/29  -Borderline iron deficient.  Started replacement 8/29  -continue to monitor iron panel in the outpatient setting      Enlarged prostate  -Continue home flomax -  voiding well - strict I&Os     Hemiplegia and hemiparesis following cerebral infarction affecting right dominant side  -Stable, no acute issue, continue plavix/statin.  -PT/OT evaluation.  -discharge to SNF     Seizure disorder as sequela of cerebrovascular accident  -Continue home regimen of phenytoin  -No seizures.       Benign non-nodular prostatic hyperplasia without lower urinary tract symptoms  -Continue home tamsulosin      Controlled type 2 diabetes mellitus with stage 4 chronic kidney disease, with long-term current use of insulin  -HgbA1c 5.8  -Hold oral antihyperglycemics while inpatient  -PRN sliding scale insulin  -ACHS glucose monitoring   -ADA diet      Pure hypercholesterolemia  -Continue statin     Benign essential HTN; ACEI hyperK  -Generally well managed but higher this morning  -Norvasc stopped due to lower extremity edema.  -Continue hydralazine and labetalol  -If elevations persist consider nifedipine.    Labs:  Recent Labs   Lab 08/30/20 0453 08/31/20  0607 09/01/20  0641   WBC 6.63 7.27 6.16   HGB 7.5* 8.2* 7.9*   HCT 23.3* 24.7* 23.8*    219 194     Recent Labs   Lab 08/30/20  0453 08/31/20  0607 09/01/20  0641    134* 134*   K 3.8 4.0 3.9    101 103   CO2 23 22* 24   BUN 40* 46* 27*   CREATININE 4.4* 4.7* 3.4*   * 152* 148*   CALCIUM 7.6* 7.5* 7.3*   MG 1.9 2.0 1.9   PHOS 2.4* 2.7 2.4*     Recent Labs   Lab 08/30/20  0453 08/31/20  0607 08/31/20  0805 09/01/20  0641   ALKPHOS 76 88  --  90   ALT 23 22  --  16   AST 16 14  --  11   ALBUMIN 1.8* 1.9*  --  1.8*   PROT 5.1* 5.1*  --  5.0*   BILITOT 0.2 0.1  --  0.2   INR  --   --  1.0  --       Recent Labs   Lab 08/30/20  1642 08/30/20  2021 08/31/20  0826 08/31/20  2145 09/01/20  0906 09/01/20  1137   POCTGLUCOSE 198* 191* 154* 135* 182* 139*     No results for input(s): CPK, CPKMB, MB, TROPONINI in the last 72 hours.    ROS (Positive in Bold, otherwise negative)  Constitutional: fever, chills, night sweats  CV:  chest pain, edema, palpitations  Resp: SOB, cough, sputum production  GI: changes in appetite, NVDC, pain, melena, hematochezia, GERD, hematemesis  : Dysuria, hematuria, urinary urgency, frequency  MSK: arthralgia/myalgia, joint swelling  Neuro/Psych: anxiety, depression    PEx   Temp:  [97.1 °F (36.2 °C)-98.8 °F (37.1 °C)]   Pulse:  [70-93]   Resp:  [17-19]   BP: (108-165)/(54-89)   SpO2:  [96 %-100 %]      General: no distress   Lungs: clear to ausculation anteriorly and posteriorly   Heart: regular rate and rhythm   Abdomen: normal bowel sounds, soft, no tenderness   Extremities: no edema. No clubbing or cyanosis       Procedures: CXR, permacath placement    Consultants: ID, nephrology     Current Discharge Medication List      START taking these medications    Details   calcitRIOL (ROCALTROL) 0.25 MCG Cap Take 1 capsule (0.25 mcg total) by mouth once daily.  Qty:        ferrous gluconate 324 mg (37.5 mg iron) Tab tablet Take 1 tablet (324 mg total) by mouth 2 (two) times daily with meals.  Qty: 60 tablet, Refills: 11      fluconazole (DIFLUCAN) 200 MG Tab Take 1 tablet (200 mg total) by mouth every evening.      folic acid (FOLVITE) 1 MG tablet Take 1 tablet (1 mg total) by mouth once daily.  Qty: 30 tablet, Refills: 11      hydrALAZINE (APRESOLINE) 25 MG tablet Take 1 tablet (25 mg total) by mouth every 8 (eight) hours.  Qty: 90 tablet, Refills: 11    Comments: .      labetaloL (NORMODYNE) 100 MG tablet Take 1 tablet (100 mg total) by mouth every 12 (twelve) hours.  Qty: 60 tablet, Refills: 11    Comments: .         CONTINUE these medications which have NOT CHANGED    Details   amLODIPine (NORVASC) 10 MG tablet Take 1 tablet (10 mg total) by mouth once daily.  Qty: 90 tablet, Refills: 3    Comments: Increased dose  Associated Diagnoses: Benign essential HTN      atorvastatin (LIPITOR) 80 MG tablet Take 1 tablet (80 mg total) by mouth once daily.  Qty: 90 tablet, Refills: 3    Comments:    Associated  "Diagnoses: Pure hypercholesterolemia      cholecalciferol, vitamin D3, (VITAMIN D3) 1,000 unit capsule Take 1 capsule (1,000 Units total) by mouth once daily.  Qty: 90 capsule, Refills: 3    Comments:    Associated Diagnoses: Secondary hyperparathyroidism of renal origin      clopidogreL (PLAVIX) 75 mg tablet Take 1 tablet (75 mg total) by mouth once daily.  Qty: 90 tablet, Refills: 3    Associated Diagnoses: History of stroke      insulin detemir U-100 (LEVEMIR FLEXTOUCH U-100 INSULN) 100 unit/mL (3 mL) InPn pen Inject 16 Units into the skin every evening. STOP NOVOLOG  Qty: 1 Box, Refills: 11    Comments: Stop novolog  Associated Diagnoses: Controlled type 2 diabetes mellitus with stage 3 chronic kidney disease, with long-term current use of insulin      tamsulosin (FLOMAX) 0.4 mg Cap Take 2 capsules (0.8 mg total) by mouth once daily.  Qty: 180 capsule, Refills: 3    Associated Diagnoses: Polyuria      blood-glucose meter Misc 1 each by Misc.(Non-Drug; Combo Route) route once daily. Pharmacy-whichever brand specified by insurance  Qty: 1 each, Refills: 0    Associated Diagnoses: Controlled type 2 diabetes mellitus with stage 3 chronic kidney disease, without long-term current use of insulin      lancets (ONETOUCH DELICA LANCETS) 33 gauge Misc 1 lancet by Misc.(Non-Drug; Combo Route) route once daily. ONCE DAILY BLOOD SUGAR TESTING; WHICHEVER BRAND COVERED BY INSURANCE  Qty: 30 each, Refills: 11    Associated Diagnoses: Controlled type 2 diabetes mellitus with stage 3 chronic kidney disease, without long-term current use of insulin      pen needle, diabetic 31 gauge x 1/4" Ndle For mealtime insulin  Qty: 300 each, Refills: 11    Associated Diagnoses: Controlled type 2 diabetes mellitus with stage 3 chronic kidney disease, without long-term current use of insulin      phenytoin (DILANTIN) 100 MG ER capsule Take 1 capsule (100 mg total) by mouth 3 (three) times daily.  Qty: 270 capsule, Refills: 3    Associated " Diagnoses: Seizure disorder as sequela of cerebrovascular accident         STOP taking these medications       baclofen (LIORESAL) 10 MG tablet Comments:   Reason for Stopping:         furosemide (LASIX) 20 MG tablet Comments:   Reason for Stopping:         furosemide (LASIX) 20 MG tablet Comments:   Reason for Stopping:               The relevant and important risks, side effects, and benefits of their medications were reviewed with patient during hospitalization and at discharge. The patient was given the opportunity to discuss and ask questions about their medications, including target symptoms, potential risks, side effects and benefits of their medications, as well as their expected prognosis if non-medication treatment options were chosen.  The patient expresses understanding of all these options and information and voluntarily consents to treatment.    Discharge Diet:renal diet with Normal Fluid intake of 1500 - 2000 mL per day    Activity: activity as tolerated    Discharge Condition: Stable    Disposition: Skilled Nursing Facility    Tests pending at the time of discharge: none     Time spent  on the discharge of the patient including review of hospital course with the patient. reviewing discharge medications and arranging follow-up care: 39 mins    Discharge examination Patient was seen and examined on the date of discharge and determined to be suitable for discharge.    Discharge plan and follow up:  It is critical that you make your follow-up appointment(s). If you are discharged on the weekend or after business hours, or if we are unable to schedule these appointments for you for any reason, you or a family member need to call during the next business day to schedule your appointment(s).    -Follow up with you PCP, Raciel Raymond MD within 1-2 weeks as arranged with SW and treatment team prior to discharge  -Take all medications as prescribed and listed above.  -Recommended Follow-up Tests: eye  exam      - Please return to ED or call your physician if you have:         1. Fevers > 101.5 unresponsive to tylenol.       2. Abdominal pain and/or distention       3. Intractable nausea, vomiting or diarrhea       4. Inability to tolerate adequate oral intake of food       5. Neurologic changes, chest pain or shortness of breath         Future Appointments   Date Time Provider Department Center   9/8/2020  1:00 PM Janie Guerrero MD [facility] OPHTHAL [facility]   9/14/2020 10:00 AM Day Currie MD [facility] ID [address]   10/14/2020  3:20 PM Bob Delgado MD [facility] PHYSMD [place]   10/20/2020 10:30 AM Sabi Douglas DPM [facility] POD [place]     F/u with ID, ophthalmology, PCP      Rosalva Manning MD  Hospital Medicine Staff  [phone] pager

## 2020-09-01 NOTE — PLAN OF CARE
Problem: Occupational Therapy Goal  Goal: Occupational Therapy Goal  Description: Goals to be met by: 09/15/20     Patient will increase functional independence with ADLs by performing:    Feeding with Modified [place].  UE Dressing with Modified [place].  LE Dressing with Minimal Assistance.  Grooming while seated with Modified [place].  Toileting from bedside commode with Stand-by Assistance for hygiene and clothing management.   Rolling to Bilateral with Supervision.   Supine to sit with Supervision.  Step transfer with Stand-by Assistance  Stand pivot transfer with Stand-by Assistance  Bed>wheelchair transfer with Stand-by assistance   Pt will manually propel w/c household distance with MOD I.   Toilet transfer to bedside commode with Stand-by Assistance.  Upper extremity exercise program x15 reps per handout, with independence.    Outcome: Ongoing, Progressing     Pt stood 2x at EOB with MAX A x2 with str ; MAX A x2 for sidesteps at EOB. Pt had a good session. POC updated- goals remain appropriate.

## 2020-09-01 NOTE — NURSING
Patient returned from dialysis. VSS. Safety precautions maintained. Scheduled medication given. No c/o pain. Will continue to monitor.

## 2020-09-01 NOTE — PLAN OF CARE
Patient ordered to transfer to [facility]; [place] confirmed that all orders and clinicals have been received by facility; follow up appointment list forwarded to facility via ; call report information forwarded to nurse Prescott; transportation has been arranged for 1730; Nurse Prescott informed that all discharge planning needs have been met and patient can discharge from Case Management standpoint         09/01/20 1614   Final Note   Assessment Type Final Discharge Note   Anticipated Discharge Disposition SNF   What phone number can be called within the next 1-3 days to see how you are doing after discharge? [phone]   Hospital Follow Up  Appt(s) scheduled? Yes   Discharge plans and expectations educations in teach back method with documentation complete? Yes   Right Care Referral Info   Referral Type Skilled Nursing   Facility Name [facility]   Post-Acute Status   Post-Acute Authorization Placement   Post-Acute Placement Status Set-up Complete   Diaylsis Status Set-up Complete  ([facility])   Discharge Delays None known at this time

## 2020-09-01 NOTE — NURSING
Report given to receiving nurse Kenyon. Pt awake in bed. Walking rounds completed. Pt assessed, NAD noted.     24hr chart check completed.

## 2020-09-01 NOTE — PLAN OF CARE
Problem: Physical Therapy Goal  Goal: Physical Therapy Goal  Description: Goals to be met by: 20    Patient will increase functional independence with mobility by performin. Supine to sit with Stand-by Assistance  2. Sit to supine with Stand-by Assistance  3. Sit to stand transfer with Stand-by Assistance using Quad Cane.  4. Bed to chair transfer with Minimal Assistance using Quad Cane and stand pivot transfer.  5. Bed to Wheelchair transfer with Stand-by Assistance and squat pivot transfer.  6. Gait x 50 feet with Minimal Assistance using Quad Cane.   7. Stand for 20 minutes with Stand-by Assistance using Quad Cane  8. Lower extremity exercise program x20 reps per handout, with assistance as needed    Outcome: Ongoing, Progressing    Pt able to stand and take ~6 sidesteps along bedside with mod A of 2 persons using SBQC.

## 2020-09-01 NOTE — PT/OT/SLP PROGRESS
Physical Therapy Treatment    Patient Name:  Miguel Moore   MRN:  [id number]    Recommendations:     Discharge Recommendations:  nursing facility, skilled   Discharge Equipment Recommendations: bedside commode   Barriers to discharge home: Pt with decreased mobility and not at PLOF.    Assessment:     Miguel Moore is a 65 y.o. male admitted with a medical diagnosis of ESRD (end stage renal disease).  He presents with the following impairments/functional limitations:  weakness, abnormal tone, impaired joint extensibility, impaired endurance, decreased ROM, impaired self care skills, decreased upper extremity function, decreased lower extremity function, edema, decreased safety awareness, gait instability, impaired balance.    Rehab Prognosis: Fair; patient would benefit from acute skilled PT services to address these deficits and reach maximum level of function.    Recent Surgery: Procedure(s) (LRB):  EGD (ESOPHAGOGASTRODUODENOSCOPY) (N/A) 15 Days Post-Op    Plan:     During this hospitalization, patient to be seen 5 x/week to address the identified rehab impairments via gait training, therapeutic activities, therapeutic exercises, neuromuscular re-education, wheelchair management/training and progress toward the following goals:    · Plan of Care Expires:  09/09/20    Subjective     Chief Complaint: N/A  Patient/Family Comments/goals: Pt agreeable to therapy.  Pain/Comfort:  Pain Rating 1: (Pt with no pain at rest, c/o RLE pain with ROM 2* edema/hypertonicity.)  Pain Addressed 1: Reposition      Objective:     Patient found HOB elevated with peripheral IV, bed alarm, and [employer] monitor upon PT entry to room.     General Precautions: Standard, fall, ESRD on HD MWF, DM, and seizure   Orthopedic Precautions:N/A   Braces: N/A    Functional Mobility:  Pt with flat affect, however motivated to work with therapy.  Pt required extra time to complete all tasks.    · Bed Mobility:     · Scooting: minimum assistance for  anterior scooting  · Bridging: dependence and of 2 persons to reposition HOB  · Supine to Sit: moderate assistance and of 2 persons with HOB elevated   · Sit to Supine: dependence and of 2 persons  · Transfers:     · Sit to Stand:  maximal assistance and of 2 persons with quad cane and bed slightly elevated x2 trials; Pt required extra time to achieve upright posture and assistance to weight shift to achieve proper foot placement with good MERON.  · Gait: Pt ambulated ~6 sidesteps along bedside with mod A of 2 persons using SBQC.  Pt with decreased weight shifting, foot clearance, and step length.  Pt with MAX decreased john.  Pt also required assistance with management of SBQC.   · Balance: Pt with fair static sit/stand balance and fair- dynamic sit/stand balance.      AM-PAC 6 CLICK MOBILITY  Turning over in bed (including adjusting bedclothes, sheets and blankets)?: 2  Sitting down on and standing up from a chair with arms (e.g., wheelchair, bedside commode, etc.): 2  Moving from lying on back to sitting on the side of the bed?: 2  Moving to and from a bed to a chair (including a wheelchair)?: 2  Need to walk in hospital room?: 2  Climbing 3-5 steps with a railing?: 1  Basic Mobility Total Score: 11       Therapeutic Activities and Exercises:  PROM RLE and AAROM LLE seated in all available planes: hip flex, hip abd/add, knee flex/ext, and ankle DF/PF    Balance Training  Static Standing:  Patient performed static standing on level surface  using SBQC with Moderate Assistance and moderate verbal cues for foot placement x2 trials.     Dynamic Standing:  Patient performed dynamic standing on level surface using SBQC with Moderate Assistance and moderate verbal cues during minimal excursions. 1st trial: lateral weight shifting.  2nd trial: anterior/posterior weight shifting.        Patient left with bed in chair position, RUE, and BLE elevated on pillows with all lines intact, call button in reach, bed alarm on and  Avasys monitor present.  Tray table close by.      GOALS:   Multidisciplinary Problems     Physical Therapy Goals        Problem: Physical Therapy Goal    Goal Priority Disciplines Outcome Goal Variances Interventions   Physical Therapy Goal     PT, PT/OT Ongoing, Progressing     Description: Goals to be met by: 20    Patient will increase functional independence with mobility by performin. Supine to sit with Stand-by Assistance  2. Sit to supine with Stand-by Assistance  3. Sit to stand transfer with Stand-by Assistance using Quad Cane.  4. Bed to chair transfer with Minimal Assistance using Quad Cane and stand pivot transfer.  5. Bed to Wheelchair transfer with Stand-by Assistance and squat pivot transfer.  6. Gait x 50 feet with Minimal Assistance using Quad Cane.   7. Stand for 20 minutes with Stand-by Assistance using Quad Cane  8. Lower extremity exercise program x20 reps per handout, with assistance as needed                     Time Tracking:     PT Received On: 20  PT Start Time: 1059     PT Stop Time: 1126  PT Total Time (min): 27 min     Billable Minutes: Therapeutic Activity 14 min co-tx with OT    Treatment Type: Treatment  PT/PTA: PT     PTA Visit Number: 0     Chichi Martino, PT  2020

## 2020-09-01 NOTE — PLAN OF CARE
Recommendations    1. Continue diabetic, renal diet, encourage PO intake.   2. Weigh pt 3x/week after HD    Goals: 1. Consume >/=75% of meals by discharge  Nutrition Goal Status: goal met  Communication of RD Recs: (plan of care)

## 2020-09-01 NOTE — PROGRESS NOTES
Hospital Medicine / Virtual Medicine  Progress note      [place] [facility] [place] Rosalva Manning MD   Admit Date: 8/15/2020   Hospital Day: 16  GARY:    Code status: Full Code   Principal Problem: ESRD (end stage renal disease)     VIRTUAL TELENOTE    Chief complaint: ESRD  The patient location is:   Start time:11:15 am  End time:  11:30 am  Total time spent with patient: 15 mins    Present with the patient at the time of the telemed/virtual assessment: telepresenter   I have assessed findings virtually using a telemedicine platform and with assistance of the bedside nurse or telemedicine presenter.  The attending portion of this evaluation, treatment, and documentation was performed per Rosalva Manning MD via audiovisual.    HPI: 65 y.o. 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  States he was called on 8/13/2020, but did not come to the ED until today.  He reports mild edema and weight gain of 1-2 lb.  Denies fever, chills, cough, SOB, chest pain, palpitations, orthopnea, PND, dizziness, syncope, n/v/d, abd pain, or dysuria.  In the ED, Cr/BUN 5.3/64, K+ 5.4, H/H 7.5/22.7, CPK 1262, , UA shows proteinura 3+, renal US suggest chronic medical renal disease.  His Nephrologist is Dr. Roa, last visit Oct 2019.  Nephrology consult.  Placed in observation.     Overview/Hospital Course: 65 y.o. AAM with history significant for DMII, HTN, stroke, and CKD4 presents for evaluation of worsening CKD sent in by provider. Creatine = 2.0 1 year ago now with creatine 5.4 and potassium 5.8 worsening swelling and anasarca 2-3+ edema up to abdomen and flank, activity intolerance and orthopnea X 5 pillows. He is not on hemodialysis.  Amlodipine stopped.  Worsening BP and started on Hydralazine.  Nephrology consulted.  BP  improved, but worsening Creat with persistent hyperkalemia.  Patient will need HD.  IR consulted for HD catheter.  Temporary HD catheter placed on 8/20 and patient underwent HD.  Catheter switched to tunneled catheter on 8/21.  Patient with right sided hemiplegia from previous stroke.  Deconditioned and PT/OT consulted.  Initial recommendation for SNF.  Patient will need outpatient HD arrangements and SW consulted.  Patient started spiking fevers overnight 8/25.  Unremarkable CXR.  BCx and UA obtained.    Interval hx:   Pt was seen and examined at bedside. Pt had no acute events overnight, and no new complaints this morning. Pt remained hemodynamically stable and afebrile.  Doing well today.  Feeling close to baseline.  Medically stable for discharge at this time.  PermCath placed yesterday, underwent dialysis [facility].  Continue fluconazole for Candida bacteremia till 09/11/2020.  Unfortunately, pathology is not available in the hospital to evaluate patient.  Patient has been set up with ophthalmology on 09/08/2020 to determine ultimate duration of antibiotics.    ROS (Positive in Bold, otherwise negative)  Constitutional: fever, chills, night sweats  CV: chest pain, edema, palpitations  Resp: SOB, cough, sputum production  GI: changes in appetite, NVDC, pain, melena, hematochezia, GERD, hematemesis  : Dysuria, hematuria, urinary urgency, frequency  MSK: arthralgia/myalgia, joint swelling  Neuro/Psych: anxiety, depression    PEx   Temp:  [97.1 °F (36.2 °C)-98.8 °F (37.1 °C)]   Pulse:  [70-93]   Resp:  [17-20]   BP: (108-165)/(54-89)   SpO2:  [96 %-100 %]      I & O (Last 24H):     Intake/Output Summary (Last 24 hours) at 9/1/2020 1141  Last data filed at 9/1/2020 0423  Gross per 24 hour   Intake 680 ml   Output 3500 ml   Net -2820 ml       General:  male  in no acute distress. Nontoxic. Resting in bed. Cooperative.  HEENT: NCAT. PERRL. EOMI. Sclera Anicteric.  CVS: RRR. Normal S1 S2. No  murmurs  Pulm: CTAB. Normal respiratory effort. No wheezes, rhonchi, or crackles.  Abdomen: Soft. Non-distended. No tenderness to palpation. No rebound or guarding. +BS.  Extremities: No edema. No cyanosis. Full ROM.  Neuro: Alert, oriented x 4, Spont mvt of all extremities with no focal deficits noted.    Recent Results (from the past 24 hour(s))   POCT glucose    Collection Time: 08/31/20  9:45 PM   Result Value Ref Range    POCT Glucose 135 (H) 70 - 110 mg/dL   CBC auto differential    Collection Time: 09/01/20  6:41 AM   Result Value Ref Range    WBC 6.16 3.90 - 12.70 K/uL    RBC 3.10 (L) 4.60 - 6.20 M/uL    Hemoglobin 7.9 (L) 14.0 - 18.0 g/dL    Hematocrit 23.8 (L) 40.0 - 54.0 %    Mean Corpuscular Volume 77 (L) 82 - 98 fL    Mean Corpuscular Hemoglobin 25.5 (L) 27.0 - 31.0 pg    Mean Corpuscular Hemoglobin Conc 33.2 32.0 - 36.0 g/dL    RDW 13.9 11.5 - 14.5 %    Platelets 194 150 - 350 K/uL    MPV 10.4 9.2 - 12.9 fL    Immature Granulocytes 1.1 (H) 0.0 - 0.5 %    Gran # (ANC) 4.6 1.8 - 7.7 K/uL    Immature Grans (Abs) 0.07 (H) 0.00 - 0.04 K/uL    Lymph # 0.7 (L) 1.0 - 4.8 K/uL    Mono # 0.5 0.3 - 1.0 K/uL    Eos # 0.1 0.0 - 0.5 K/uL    Baso # 0.03 0.00 - 0.20 K/uL    nRBC 0 0 /100 WBC    Gran% 75.3 (H) 38.0 - 73.0 %    Lymph% 12.0 (L) 18.0 - 48.0 %    Mono% 8.8 4.0 - 15.0 %    Eosinophil% 2.3 0.0 - 8.0 %    Basophil% 0.5 0.0 - 1.9 %    Differential Method Automated    Comprehensive metabolic panel    Collection Time: 09/01/20  6:41 AM   Result Value Ref Range    Sodium 134 (L) 136 - 145 mmol/L    Potassium 3.9 3.5 - 5.1 mmol/L    Chloride 103 95 - 110 mmol/L    CO2 24 23 - 29 mmol/L    Glucose 148 (H) 70 - 110 mg/dL    BUN, Bld 27 (H) 8 - 23 mg/dL    Creatinine 3.4 (H) 0.5 - 1.4 mg/dL    Calcium 7.3 (L) 8.7 - 10.5 mg/dL    Total Protein 5.0 (L) 6.0 - 8.4 g/dL    Albumin 1.8 (L) 3.5 - 5.2 g/dL    Total Bilirubin 0.2 0.1 - 1.0 mg/dL    Alkaline Phosphatase 90 55 - 135 U/L    AST 11 10 - 40 U/L    ALT 16 10 - 44  U/L    Anion Gap 7 (L) 8 - 16 mmol/L    eGFR if African American 21 (A) >60 mL/min/1.73 m^2    eGFR if non African American 18 (A) >60 mL/min/1.73 m^2   Magnesium    Collection Time: 09/01/20  6:41 AM   Result Value Ref Range    Magnesium 1.9 1.6 - 2.6 mg/dL   Phosphorus    Collection Time: 09/01/20  6:41 AM   Result Value Ref Range    Phosphorus 2.4 (L) 2.7 - 4.5 mg/dL   POCT glucose    Collection Time: 09/01/20  9:06 AM   Result Value Ref Range    POCT Glucose 182 (H) 70 - 110 mg/dL       Recent Labs   Lab 08/30/20  1123 08/30/20  1642 08/30/20  2021 08/31/20  0826 08/31/20  2145 09/01/20  0906   POCTGLUCOSE 181* 198* 191* 154* 135* 182*       Hemoglobin A1C   Date Value Ref Range Status   08/13/2020 5.5 4.0 - 5.6 % Final     Comment:     ADA Screening Guidelines:  5.7-6.4%  Consistent with prediabetes  >or=6.5%  Consistent with diabetes  High levels of fetal hemoglobin interfere with the HbA1C  assay. Heterozygous hemoglobin variants (HbS, HgC, etc)do  not significantly interfere with this assay.   However, presence of multiple variants may affect accuracy.     01/17/2020 6.0 (H) 4.0 - 5.6 % Final     Comment:     ADA Screening Guidelines:  5.7-6.4%  Consistent with prediabetes  >or=6.5%  Consistent with diabetes  High levels of fetal hemoglobin interfere with the HbA1C  assay. Heterozygous hemoglobin variants (HbS, HgC, etc)do  not significantly interfere with this assay.   However, presence of multiple variants may affect accuracy.     09/09/2019 6.1 (H) 4.0 - 5.6 % Final     Comment:     ADA Screening Guidelines:  5.7-6.4%  Consistent with prediabetes  >or=6.5%  Consistent with diabetes  High levels of fetal hemoglobin interfere with the HbA1C  assay. Heterozygous hemoglobin variants (HbS, HgC, etc)do  not significantly interfere with this assay.   However, presence of multiple variants may affect accuracy.          Active Hospital Problems    Diagnosis  POA    *ESRD (end stage renal disease) [N18.6]  Yes      2/27/20 renal US with dopplers no ALF.  Medical renal disease  8/2020 renal US: 1. Symmetric diffusely increased cortical echogenicity and elevated resistive indices, nonspecific indicators of chronic medical renal disease.  2. Prostatomegaly.  Some of the provided images are suggestive of a distinct hyperechoic component of the prostate gland, of uncertain etiology or significance, and potentially artifactual.  Dedicated prostate ultrasound or MRI may be of benefit for further characterization.     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      Seizure disorder as sequela of cerebrovascular accident [I69.398, G40.909]  Not Applicable     Chronic    Hemiplegia and hemiparesis following cerebral infarction affecting right dominant side [I69.351]  Not Applicable     Chronic    Pure hypercholesterolemia [E78.00]  Yes     Chronic      Resolved Hospital Problems    Diagnosis Date Resolved POA    Candidemia [B37.7] 08/28/2020 No    Fungemia [B49] 08/27/2020 No    Fungemia [B49] 08/28/2020 No    Hyperkalemia [E87.5] 08/21/2020 Yes    Non-traumatic rhabdomyolysis [M62.82] 08/21/2020 Yes    Type 2 diabetes mellitus with diabetic neuropathy, with long-term current use of insulin [E11.40, Z79.4] 08/26/2020 Not Applicable          Assessment and Plan for problems addressed today:      atorvastatin  80 mg Oral Daily    calcitRIOL  0.25 mcg Oral Daily    clopidogreL  75 mg Oral Daily    epoetin  marisol-epbx  10,000 Units Subcutaneous Every Tues, Thurs, Sat    ferrous gluconate  324 mg Oral BID WM    fluconazole  200 mg Oral QHS    folic acid  1 mg Oral Daily    hydrALAZINE  25 mg Oral Q8H    labetaloL  100 mg Oral Q12H    lactulose  30 g Oral Once    phenytoin  100 mg Oral TID    tamsulosin  2 capsule Oral Daily    trazodone  50 mg Oral QHS     acetaminophen, dextrose 50%, dextrose 50%, diazePAM, glucagon (human recombinant), glucose, glucose, heparin (porcine), hydrALAZINE, insulin aspart U-100, oxyCODONE-acetaminophen, senna-docusate 8.6-50 mg, sodium chloride 0.9%, sodium chloride 0.9%    ESRD (end stage renal disease)  -Admitted to inpatient status  -On admit had anasarca up to abdomen including significant penile and scrotal edema.    -Previously had CKD4 with baseline Cr of 3 but was 5.8 on admit.    -Noted nephrotic proteinuria  -Continue strict ins/outs and daily weights  -Dr Menard and Dr. Tamez on board and input appreciated  -Continue diuresis with lasix  -Continue dialysis per nephrology  -Outpatient HD has been arranged  -Had planned discharge 8/27 if remained afebrile and cultures negative.  Unfortunately his blood culture grew Candida parapsilosis  -Dr. Gomez with ID consulted and case discussed with him.  Started micafungin 8/27 and now on fluconazole.  -Dialysis line removed after HD 8/28 and plan line holiday over weekend.  -new line by IR on 8/31, tolerated dialysis fluid.  -continue outpatient dialysis.     Anasarca  -Treatment as above     Nephrotic syndrome  -Treatment as above     Candida parapsilosis infection  -Found to have candidemia - source unclear  -infectious disease consulted, Started on micafungin and now converted to fluconazole  -Repeat cultures negative so far  -Dialysis line removed 8/28.   line replaced on 08/31 after line holiday.  -end date of fluconazole will be 09/11/2020.  Patient has ophthalmology follow-up scheduled on 09/08/2020. Tentative end date 9/11/2020  If no endophthalmitis. If noted to have endophthalmitis during optho visit, would need at least 4-6 weeks of treatment     Hypocalcemia  -Corrected calcium is 8.9  -Continue calcitrol 0.25 mcg and calcium acetate 667 po tid AC     Anemia  -Anemia of CKD  -No evidence of bleeding.  -Hb stable above transfusion threshold x 3 days  -He is folate deficient.  Started replacement 8/29  -Borderline iron deficient.  Started replacement 8/29  -continue to monitor iron panel in the outpatient setting      Enlarged prostate  -Continue home flomax - voiding well - strict I&Os     Hemiplegia and hemiparesis following cerebral infarction affecting right dominant side  -Stable, no acute issue, continue plavix/statin.  -PT/OT evaluation.  -discharge to SNF     Seizure disorder as sequela of cerebrovascular accident  -Continue home regimen of phenytoin  -No seizures.       Benign non-nodular prostatic hyperplasia without lower urinary tract symptoms  -Continue home tamsulosin      Controlled type 2 diabetes mellitus with stage 4 chronic kidney disease, with long-term current use of insulin  -HgbA1c 5.8  -Hold oral antihyperglycemics while inpatient  -PRN sliding scale insulin  -ACHS glucose monitoring   -ADA diet      Pure hypercholesterolemia  -Continue statin     Benign essential HTN; ACEI hyperK  -Generally well managed but higher this morning  -Norvasc stopped due to lower extremity edema.  -Continue hydralazine and labetalol  -If elevations persist consider nifedipine.    DVT PPx: SCDs    Discharge plan and follow up:  Medically stable for discharge to SNF     Rosalva Manning MD  Hospital Medicine Staff

## 2020-09-01 NOTE — PLAN OF CARE
Infectious Disease Note    Chart has been reviewed. Patient discharged today. Inpatient Ophthalmology no available at this site. Discussed with Dr. Manning. Patient will need outpatient Ophthalmology evaluation to determined total treatment duration with fluconazole. ID clinic follow up on 9/14 at 10 am. Please call if questions arise.      Day Currie MD  Infectious Diseases

## 2020-09-02 LAB
BACTERIA BLD CULT: NORMAL
BACTERIA BLD CULT: NORMAL

## 2020-09-02 NOTE — PT/OT/SLP DISCHARGE
Physical Therapy Discharge Summary    Name: Miguel Moore  MRN: [id number]   Principal Problem: ESRD (end stage renal disease)     Patient Discharged from acute Physical Therapy on 20.  Please refer to prior PT notes for functional status.     Assessment:     Patient appropriate for care in another setting.    Objective:     GOALS:   Multidisciplinary Problems     Physical Therapy Goals        Problem: Physical Therapy Goal    Goal Priority Disciplines Outcome Goal Variances Interventions   Physical Therapy Goal     PT, PT/OT Ongoing, Progressing     Description: Goals to be met by: 20    Patient will increase functional independence with mobility by performin. Supine to sit with Stand-by Assistance  2. Sit to supine with Stand-by Assistance  3. Sit to stand transfer with Stand-by Assistance using Quad Cane.  4. Bed to chair transfer with Minimal Assistance using Quad Cane and stand pivot transfer.  5. Bed to Wheelchair transfer with Stand-by Assistance and squat pivot transfer.  6. Gait x 50 feet with Minimal Assistance using Quad Cane.   7. Stand for 20 minutes with Stand-by Assistance using Quad Cane  8. Lower extremity exercise program x20 reps per handout, with assistance as needed                     Reasons for Discontinuation of Therapy Services  Transfer to alternate level of care.      Plan:     Patient Discharged to: Skilled Nursing Facility.    Chichi Martino, PT  2020

## 2020-09-02 NOTE — PT/OT/SLP DISCHARGE
Occupational Therapy Discharge Summary    Miguel Moore  MRN: [id number]   Principal Problem: ESRD (end stage renal disease)      Patient Discharged from acute Occupational Therapy on 09/01/20.  Please refer to prior OT notes for functional status.    Assessment:      Patient appropriate for care in another setting.    Objective:     GOALS:   Multidisciplinary Problems     Occupational Therapy Goals        Problem: Occupational Therapy Goal    Goal Priority Disciplines Outcome Interventions   Occupational Therapy Goal     OT, PT/OT Ongoing, Progressing    Description: Goals to be met by: 09/02/20     Patient will increase functional independence with ADLs by performing:    Feeding with Modified [place].  UE Dressing with Modified [place].  LE Dressing with Minimal Assistance.  Grooming while seated with Modified [place].  Toileting from bedside commode with Stand-by Assistance for hygiene and clothing management.   Rolling to Bilateral with Supervision.   Supine to sit with Supervision.  Step transfer with Stand-by Assistance  Stand pivot transfer with Stand-by Assistance  Bed>wheelchair transfer with Stand-by assistance   Pt will manually propel w/c household distance with MOD I.   Toilet transfer to bedside commode with Stand-by Assistance.  Upper extremity exercise program x15 reps per handout, with independence.                     Reasons for Discontinuation of Therapy Services  Transfer to alternate level of care.      Plan:     Patient Discharged to: Skilled Nursing Facility ([facility])     Minerva Pleitez, JEROD  9/2/2020

## 2020-09-02 NOTE — NURSING
Report received from Kenyon MAIER. Patient awake in bed, watching TV. NAD noted. Previous nurse removed IV, No tele monitor in place. Safety precautions maintained. Call light within reach. Will continue to monitor.    8:15pm- Patient discharged. Belongings and discharge papers sent. Vitals stable.

## 2020-09-06 ENCOUNTER — PATIENT OUTREACH (OUTPATIENT)
Dept: ADMINISTRATIVE | Facility: OTHER | Age: 65
End: 2020-09-06

## 2020-09-07 NOTE — PROGRESS NOTES
Chart reviewed.   Immunizations: Triggered Imm Registry     Orders placed: n/a  Upcoming appts to satisfy VERA topics: n/a

## 2020-09-08 ENCOUNTER — TELEPHONE (OUTPATIENT)
Dept: OPHTHALMOLOGY | Facility: CLINIC | Age: 65
End: 2020-09-08

## 2020-09-08 NOTE — TELEPHONE ENCOUNTER
Spoke to Yuko and rescheduled appointment for 09/15/2020 @ 11:30 and that Dr. Guerrero will see him on the stretcher     ----- Message from Zeynep Goodman sent at 9/8/2020  9:52 AM CDT -----  Regarding: Urgent appt  Contact: Yuko Huntley from [facility] calling to find out if urgent appt can be rescheduled.  She states pt is on a stretcher due to fluid build up and wanted to know if he could be seen on a stretcher. She says she has to put in a request to get him transferred by ambulance to get here and cannot do that on the same day she needs a day in advance.  Also, he is in dialysis on M< W< F.  Please contact her to discuss further at [phone]

## 2020-09-14 ENCOUNTER — TELEPHONE (OUTPATIENT)
Dept: OPHTHALMOLOGY | Facility: CLINIC | Age: 65
End: 2020-09-14

## 2020-09-14 NOTE — TELEPHONE ENCOUNTER
----- Message from Jessica Mckinney sent at 9/14/2020  3:30 PM CDT -----  Contact: Yuko @ [phone]  Pt has an urgent appt scheduled tomorrow, but the home he lives in is needing to reschedule due to weather. When looking at the schedule, I'm only showing next Friday available. The caretaker says the pt is unavailable on Mon, Wed and Fridays. He is needing to reschedule for another Tuesday.

## 2020-09-15 ENCOUNTER — TELEPHONE (OUTPATIENT)
Dept: OPHTHALMOLOGY | Facility: CLINIC | Age: 65
End: 2020-09-15

## 2020-09-15 NOTE — TELEPHONE ENCOUNTER
----- Message from Zeynep Goodman sent at 9/15/2020  2:03 PM CDT -----  Regarding: Reschedule  Contact: [place]  MsDaniel Whartonda on behalf of pt calling to reschedule the urgent appt that was missed on yesterday due to weather.  She can be reached at [phone]

## 2020-09-17 ENCOUNTER — OFFICE VISIT (OUTPATIENT)
Dept: INFECTIOUS DISEASES | Facility: CLINIC | Age: 65
End: 2020-09-17
Payer: MEDICARE

## 2020-09-17 VITALS
BODY MASS INDEX: 32.43 KG/M2 | TEMPERATURE: 98 F | SYSTOLIC BLOOD PRESSURE: 159 MMHG | WEIGHT: 214 LBS | HEART RATE: 89 BPM | HEIGHT: 68 IN | DIASTOLIC BLOOD PRESSURE: 90 MMHG

## 2020-09-17 DIAGNOSIS — B37.9 CANDIDA PARAPSILOSIS INFECTION: Primary | ICD-10-CM

## 2020-09-17 PROCEDURE — 99213 OFFICE O/P EST LOW 20 MIN: CPT | Mod: PBBFAC | Performed by: INTERNAL MEDICINE

## 2020-09-17 PROCEDURE — 99999 PR PBB SHADOW E&M-EST. PATIENT-LVL III: ICD-10-PCS | Mod: PBBFAC,,, | Performed by: INTERNAL MEDICINE

## 2020-09-17 PROCEDURE — 99999 PR PBB SHADOW E&M-EST. PATIENT-LVL III: CPT | Mod: PBBFAC,,, | Performed by: INTERNAL MEDICINE

## 2020-09-17 PROCEDURE — 99213 PR OFFICE/OUTPT VISIT, EST, LEVL III, 20-29 MIN: ICD-10-PCS | Mod: S$PBB,,, | Performed by: INTERNAL MEDICINE

## 2020-09-17 PROCEDURE — 99213 OFFICE O/P EST LOW 20 MIN: CPT | Mod: S$PBB,,, | Performed by: INTERNAL MEDICINE

## 2020-09-17 NOTE — PROGRESS NOTES
Subjective:      Patient ID: Miguel Moore is a 65 y.o. male.    Chief Complaint:Follow-up      History of Present Illness    A 65-year-old man with past stroke, HTN, DM2, CKD 5 on acute HD, and recent C parapsilosis candidemia who is seen as a hospital follow up. Mr. Moore was discharged on a 2 week course of fluconazole with plans for outpatient opthalmology evaluation to rule out endophthalmitis prior to completion. Unfortunately, he rescheduled the appointment and has yet had fungal infection rule out. He is very angry during today's visit and had an argument with EMS personnel who is transporting him as his SNF/NH could not explain the reason for his visit today even though this was discussed with the patient prior to his hospital discharge. He continues to have HD three times per week and says his edema has decreased.     Review of Systems   Constitution: Negative for chills, decreased appetite, fever, malaise/fatigue, night sweats, weight gain and weight loss.   HENT: Negative for congestion, ear pain, hearing loss, hoarse voice, sore throat and tinnitus.    Eyes: Negative for blurred vision, redness and visual disturbance.   Cardiovascular: Positive for leg swelling. Negative for chest pain and palpitations.   Respiratory: Negative for cough, hemoptysis, shortness of breath and sputum production.    Hematologic/Lymphatic: Negative for adenopathy. Does not bruise/bleed easily.   Skin: Negative for dry skin, itching, rash and suspicious lesions.   Musculoskeletal: Negative for back pain, joint pain, myalgias and neck pain.   Gastrointestinal: Negative for abdominal pain, constipation, diarrhea, heartburn, nausea and vomiting.   Genitourinary: Negative for dysuria, flank pain, frequency, hematuria, hesitancy and urgency.   Neurological: Negative for dizziness, headaches, numbness, paresthesias and weakness.   Psychiatric/Behavioral: Negative for depression and memory loss. The patient does not have insomnia  and is not nervous/anxious.      Objective:   Physical Exam  Vitals signs reviewed.   Constitutional:       Appearance: He is well-developed.   HENT:      Head: Normocephalic and atraumatic.      Right Ear: External ear normal.      Left Ear: External ear normal.   Eyes:      Conjunctiva/sclera: Conjunctivae normal.   Neck:      Musculoskeletal: Normal range of motion and neck supple.      Thyroid: No thyromegaly.   Cardiovascular:      Rate and Rhythm: Normal rate and regular rhythm.      Heart sounds: Normal heart sounds. No murmur.   Pulmonary:      Effort: Pulmonary effort is normal.      Breath sounds: Normal breath sounds. No wheezing or rales.   Abdominal:      General: Bowel sounds are normal.      Palpations: Abdomen is soft. There is no mass.      Tenderness: There is no abdominal tenderness. There is no rebound.   Musculoskeletal: Normal range of motion.      Right lower leg: Edema (3+) present.      Left lower leg: Edema (3+) present.   Lymphadenopathy:      Cervical: No cervical adenopathy.   Skin:     General: Skin is warm and dry.   Neurological:      Mental Status: He is alert and oriented to person, place, and time.   Psychiatric:         Mood and Affect: Affect is angry.         Behavior: Behavior normal.       Assessment:       1. 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 Doing well and tolerating antifungal therapy. Unfortunately, has not had ophthalmologic evaluation to determine extent of his infection.   · Continue fluconazole 200 mg PO daily.  · Follow up with Ophthalmology.   · If no fungal endophthalmitis then discontinue can discontinue fluconazole.   · If evidence of endophthalmitis or fungal invasive disease then complete at least 6 weeks of fluconazole (10/12/2020).  · Provided appointments for Ophthalmology and ID follow up.   · RTC on 10/13/20

## 2020-09-21 ENCOUNTER — HOSPITAL ENCOUNTER (EMERGENCY)
Facility: HOSPITAL | Age: 65
Discharge: HOME | End: 2020-09-22
Attending: EMERGENCY MEDICINE
Payer: MEDICARE

## 2020-09-21 DIAGNOSIS — M79.89 RIGHT LEG SWELLING: ICD-10-CM

## 2020-09-21 DIAGNOSIS — I82.412 ACUTE DEEP VEIN THROMBOSIS (DVT) OF FEMORAL VEIN OF LEFT LOWER EXTREMITY: Primary | ICD-10-CM

## 2020-09-21 LAB
ALBUMIN SERPL BCP-MCNC: 2.1 G/DL (ref 3.5–5.2)
ALP SERPL-CCNC: 103 U/L (ref 55–135)
ALT SERPL W/O P-5'-P-CCNC: 17 U/L (ref 10–44)
ANION GAP SERPL CALC-SCNC: 9 MMOL/L (ref 8–16)
AST SERPL-CCNC: 17 U/L (ref 10–40)
BASOPHILS # BLD AUTO: 0.04 K/UL (ref 0–0.2)
BASOPHILS NFR BLD: 1.1 % (ref 0–1.9)
BILIRUB SERPL-MCNC: 0.1 MG/DL (ref 0.1–1)
BUN SERPL-MCNC: 31 MG/DL (ref 8–23)
CALCIUM SERPL-MCNC: 7.4 MG/DL (ref 8.7–10.5)
CHLORIDE SERPL-SCNC: 104 MMOL/L (ref 95–110)
CO2 SERPL-SCNC: 28 MMOL/L (ref 23–29)
CREAT SERPL-MCNC: 3 MG/DL (ref 0.5–1.4)
DIFFERENTIAL METHOD: ABNORMAL
EOSINOPHIL # BLD AUTO: 0.2 K/UL (ref 0–0.5)
EOSINOPHIL NFR BLD: 4.3 % (ref 0–8)
ERYTHROCYTE [DISTWIDTH] IN BLOOD BY AUTOMATED COUNT: 14.8 % (ref 11.5–14.5)
EST. GFR  (AFRICAN AMERICAN): 24 ML/MIN/1.73 M^2
EST. GFR  (NON AFRICAN AMERICAN): 21 ML/MIN/1.73 M^2
GLUCOSE SERPL-MCNC: 162 MG/DL (ref 70–110)
HCT VFR BLD AUTO: 25.5 % (ref 40–54)
HGB BLD-MCNC: 8.6 G/DL (ref 14–18)
IMM GRANULOCYTES # BLD AUTO: 0.01 K/UL (ref 0–0.04)
IMM GRANULOCYTES NFR BLD AUTO: 0.3 % (ref 0–0.5)
LYMPHOCYTES # BLD AUTO: 1 K/UL (ref 1–4.8)
LYMPHOCYTES NFR BLD: 25.8 % (ref 18–48)
MCH RBC QN AUTO: 26.3 PG (ref 27–31)
MCHC RBC AUTO-ENTMCNC: 33.7 G/DL (ref 32–36)
MCV RBC AUTO: 78 FL (ref 82–98)
MONOCYTES # BLD AUTO: 0.5 K/UL (ref 0.3–1)
MONOCYTES NFR BLD: 12.1 % (ref 4–15)
NEUTROPHILS # BLD AUTO: 2.1 K/UL (ref 1.8–7.7)
NEUTROPHILS NFR BLD: 56.4 % (ref 38–73)
NRBC BLD-RTO: 0 /100 WBC
PLATELET # BLD AUTO: 180 K/UL (ref 150–350)
PMV BLD AUTO: 9.9 FL (ref 9.2–12.9)
POTASSIUM SERPL-SCNC: 3.4 MMOL/L (ref 3.5–5.1)
PROT SERPL-MCNC: 5.5 G/DL (ref 6–8.4)
RBC # BLD AUTO: 3.27 M/UL (ref 4.6–6.2)
SODIUM SERPL-SCNC: 141 MMOL/L (ref 136–145)
WBC # BLD AUTO: 3.72 K/UL (ref 3.9–12.7)

## 2020-09-21 PROCEDURE — 85025 COMPLETE CBC W/AUTO DIFF WBC: CPT

## 2020-09-21 PROCEDURE — 99283 EMERGENCY DEPT VISIT LOW MDM: CPT

## 2020-09-21 PROCEDURE — 80053 COMPREHEN METABOLIC PANEL: CPT

## 2020-09-22 ENCOUNTER — OFFICE VISIT (OUTPATIENT)
Dept: OPHTHALMOLOGY | Facility: CLINIC | Age: 65
End: 2020-09-22
Attending: OPHTHALMOLOGY
Payer: MEDICARE

## 2020-09-22 VITALS
HEIGHT: 68 IN | SYSTOLIC BLOOD PRESSURE: 182 MMHG | DIASTOLIC BLOOD PRESSURE: 84 MMHG | TEMPERATURE: 98 F | HEART RATE: 82 BPM | BODY MASS INDEX: 32.43 KG/M2 | RESPIRATION RATE: 19 BRPM | OXYGEN SATURATION: 100 % | WEIGHT: 214 LBS

## 2020-09-22 DIAGNOSIS — B37.9 CANDIDA PARAPSILOSIS INFECTION: ICD-10-CM

## 2020-09-22 PROCEDURE — 99999 PR PBB SHADOW E&M-EST. PATIENT-LVL III: ICD-10-PCS | Mod: PBBFAC,,, | Performed by: OPHTHALMOLOGY

## 2020-09-22 PROCEDURE — 25000003 PHARM REV CODE 250: Performed by: EMERGENCY MEDICINE

## 2020-09-22 PROCEDURE — 99999 PR PBB SHADOW E&M-EST. PATIENT-LVL III: CPT | Mod: PBBFAC,,, | Performed by: OPHTHALMOLOGY

## 2020-09-22 PROCEDURE — 92014 PR EYE EXAM, EST PATIENT,COMPREHESV: ICD-10-PCS | Mod: S$PBB,,, | Performed by: OPHTHALMOLOGY

## 2020-09-22 PROCEDURE — 92014 COMPRE OPH EXAM EST PT 1/>: CPT | Mod: S$PBB,,, | Performed by: OPHTHALMOLOGY

## 2020-09-22 PROCEDURE — 99213 OFFICE O/P EST LOW 20 MIN: CPT | Mod: PBBFAC | Performed by: OPHTHALMOLOGY

## 2020-09-22 RX ADMIN — APIXABAN 10 MG: 5 TABLET, FILM COATED ORAL at 12:09

## 2020-09-22 NOTE — ED PROVIDER NOTES
"Encounter Date: 9/21/2020    SCRIBE #1 NOTE: I, Lucinda Horton, am scribing for, and in the presence of,  Emilio Jaimes MD. I have scribed the following portions of the note - Other sections scribed: HPI, ROS, PE.       History     Chief Complaint   Patient presents with    Leg Swelling     Patient from [facility] with right groin and leg swelling. Patient received an US which states "nonocclusive dvt in right femoral popliteal venous system."     CC: Leg swelling     Patient is a 65-year-old male with a PMHx of DM and HTN who presents to the ED, per EMS, complaining of RLE swelling that worsened this morning. Patient is a resident at [facility]. He reports he received an US on 09/21/2020 at 8:10 pm of the RLE that showed a nonocclusive DVT in the right femoral popliteal venous system. He reports he received dialysis today. Patient takes Plavix. He ambulates with a walker. Patient denies any RLE pain. He denies CP or SOB. No further complaints.     The history is provided by the patient.     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 Tay    CATARACT EXTRACTION W/  INTRAOCULAR LENS IMPLANT Left 10/19/2017    Dr. Tay    ESOPHAGOGASTRODUODENOSCOPY N/A 8/17/2020    Procedure: EGD (ESOPHAGOGASTRODUODENOSCOPY);  Surgeon: Desmond Chapman MD;  [facility];  Service: Endoscopy;  Laterality: N/A;    FEMORAL ARTERY STENT      KNEE SURGERY      bilateral scope     Family History   Problem Relation Age of Onset    Diabetes Mother     Heart disease Mother  "    Hypertension Mother     Cataracts Mother     No Known Problems Father     Hypertension Sister     No Known Problems Brother     No Known Problems Daughter     No Known Problems Maternal Aunt     No Known Problems Maternal Uncle     No Known Problems Paternal Aunt     No Known Problems Paternal Uncle     No Known Problems Maternal Grandmother     No Known Problems Maternal Grandfather     No Known Problems Paternal Grandmother     No Known Problems Paternal Grandfather     Amblyopia Neg Hx     Blindness Neg Hx     Cancer Neg Hx     Glaucoma Neg Hx     Macular degeneration Neg Hx     Retinal detachment Neg Hx     Strabismus Neg Hx     Stroke Neg Hx     Thyroid disease Neg Hx      Social History     Tobacco Use    Smoking status: Never Smoker    Smokeless tobacco: Never Used   Substance Use Topics    Alcohol use: No    Drug use: No     Review of Systems   Constitutional: Negative.    HENT: Negative.    Eyes: Negative.    Respiratory: Negative.    Cardiovascular: Positive for leg swelling (RLE).   Gastrointestinal: Negative.    Genitourinary: Negative.    Musculoskeletal: Negative.    Skin: Negative.    Neurological: Negative.        Physical Exam     Initial Vitals [09/21/20 2217]   BP Pulse Resp Temp SpO2   (!) 154/80 82 19 98.4 °F (36.9 °C) 99 %      MAP       --         Physical Exam    Nursing note and vitals reviewed.  Constitutional: He appears well-developed and well-nourished. He is not diaphoretic. No distress.   HENT:   Head: Normocephalic and atraumatic.   Nose: Nose normal.   Mouth/Throat: No oropharyngeal exudate.   Eyes: EOM are normal. Pupils are equal, round, and reactive to light.   Neck: Normal range of motion. Neck supple. No tracheal deviation present. No JVD present.   Cardiovascular: Normal rate, regular rhythm and normal heart sounds.   No murmur heard.  Pulmonary/Chest: Breath sounds normal. No respiratory distress. He has no wheezes. He has no rhonchi. He has no  rales.   Abdominal: Soft. Bowel sounds are normal. He exhibits no distension. There is no abdominal tenderness. There is no rebound and no guarding.   Musculoskeletal: Edema present. No tenderness.      Comments: 2+ edema to BLE. RUE contracture.    Neurological: He is alert and oriented to person, place, and time. He has normal strength. No cranial nerve deficit.   Skin: Skin is warm and dry. Capillary refill takes less than 2 seconds. No rash noted. No erythema.         ED Course   Procedures  Labs Reviewed   CBC W/ AUTO DIFFERENTIAL - Abnormal; Notable for the following components:       Result Value    WBC 3.72 (*)     RBC 3.27 (*)     Hemoglobin 8.6 (*)     Hematocrit 25.5 (*)     Mean Corpuscular Volume 78 (*)     Mean Corpuscular Hemoglobin 26.3 (*)     RDW 14.8 (*)     All other components within normal limits   COMPREHENSIVE METABOLIC PANEL - Abnormal; Notable for the following components:    Potassium 3.4 (*)     Glucose 162 (*)     BUN, Bld 31 (*)     Creatinine 3.0 (*)     Calcium 7.4 (*)     Total Protein 5.5 (*)     Albumin 2.1 (*)     eGFR if  24 (*)     eGFR if non  21 (*)     All other components within normal limits          Imaging Results    None          Medical Decision Making:   History:   Old Medical Records: I decided to obtain old medical records.  Clinical Tests:   Lab Tests: Ordered and Reviewed  Radiological Study: Ordered and Reviewed            Scribe Attestation:   Scribe #1: I performed the above scribed service and the documentation accurately describes the services I performed. I attest to the accuracy of the note.      65-year-old male with past medical history as noted above presents with worsening right groin pain and leg swelling.  Ultrasound performed earlier today showing nonocclusive DVT in the right femoral and popliteal venous system, CBC and CMP otherwise comparable to blood work obtained here.  Patient with no chest pain, shortness of  breath, or any current complaint to warrant further workup at this time.  Pulses intact to bilateral lower extremity, mild swelling when compared to left side, patient otherwise well appearing and at baseline.  Will start patient on Eliquis for DVT, 1st dose given here in the ED, follow-up with Dr. Raymond strongly advised, patient advised on treatment, and possible increased bleeding risk as he is also on DAPT with his prior stroke.  At this time based on physical examination patient do not suspect PE, acute cardiac etiology, arterial occlusion, CVA/TIA, infection, or any further malignant etiology.  Discussed return precautions with patient, all questions answered, patient discharged home improved and stable.                    Clinical Impression:     ICD-10-CM ICD-9-CM   1. Acute deep vein thrombosis (DVT) of femoral vein of left lower extremity  I82.412 453.41   2. Right leg swelling  M79.89 729.81                          ED Disposition Condition    Discharge Stable        ED Prescriptions     Medication Sig Dispense Start Date End Date Auth. Provider    apixaban (ELIQUIS) 5 mg Tab Take 2 tablets (10 mg total) by mouth 2 (two) times daily. for 7 days 28 tablet 9/21/2020 9/28/2020 Emilio Jaimes MD    apixaban (ELIQUIS) 5 mg Tab Take 1 tablet (5 mg total) by mouth 2 (two) times daily. 166 tablet 9/28/2020 12/20/2020 Emilio Jaimes MD        Follow-up Information     Follow up With Specialties Details Why Contact Info    [facility] Emergency Medicine Go to  If symptoms worsen [address]  [facility] [address] [phone]    Raciel Raymond MD Internal Medicine Go in 1 week As needed [address] [facility] [address]  [phone]                                  I, Emilio Jaimes M.D., personally performed the services described in this documentation. All medical record entries made by the scribe were at my direction and in my presence.  I have reviewed the  chart and agree that the record reflects my personal performance and is accurate and complete.         Emilio Jaimes MD  09/22/20 [id number]

## 2020-10-12 ENCOUNTER — PATIENT OUTREACH (OUTPATIENT)
Dept: ADMINISTRATIVE | Facility: OTHER | Age: 65
End: 2020-10-12

## 2020-10-13 ENCOUNTER — OFFICE VISIT (OUTPATIENT)
Dept: INFECTIOUS DISEASES | Facility: CLINIC | Age: 65
End: 2020-10-13
Payer: MEDICARE

## 2020-10-13 VITALS
DIASTOLIC BLOOD PRESSURE: 77 MMHG | HEART RATE: 60 BPM | WEIGHT: 190 LBS | HEIGHT: 68 IN | TEMPERATURE: 98 F | SYSTOLIC BLOOD PRESSURE: 153 MMHG | BODY MASS INDEX: 28.79 KG/M2

## 2020-10-13 DIAGNOSIS — B37.9 CANDIDA PARAPSILOSIS INFECTION: Primary | ICD-10-CM

## 2020-10-13 PROCEDURE — 99213 PR OFFICE/OUTPT VISIT, EST, LEVL III, 20-29 MIN: ICD-10-PCS | Mod: S$PBB,,, | Performed by: INTERNAL MEDICINE

## 2020-10-13 PROCEDURE — 99999 PR PBB SHADOW E&M-EST. PATIENT-LVL IV: CPT | Mod: PBBFAC,,, | Performed by: INTERNAL MEDICINE

## 2020-10-13 PROCEDURE — 99999 PR PBB SHADOW E&M-EST. PATIENT-LVL IV: ICD-10-PCS | Mod: PBBFAC,,, | Performed by: INTERNAL MEDICINE

## 2020-10-13 PROCEDURE — 99214 OFFICE O/P EST MOD 30 MIN: CPT | Mod: PBBFAC | Performed by: INTERNAL MEDICINE

## 2020-10-13 PROCEDURE — 99213 OFFICE O/P EST LOW 20 MIN: CPT | Mod: S$PBB,,, | Performed by: INTERNAL MEDICINE

## 2020-10-13 NOTE — PROGRESS NOTES
Subjective:      Patient ID: Miguel Moore is a 65 y.o. male.    Chief Complaint:Follow-up      History of Present Illness    A 65-year-old man with past stroke, HTN, DM2, CKD 5 on acute HD, and recent C parapsilosis candidemia who is seen as a follow up for ongoing management of his fungal infection. When last seen Mr. Moore had yet to follow up with Ophthalmology to assess for invasive ophthalmologic fungal infection. His fluconazole therapy was extended pending evaluation. Unfortunately, post his clinic visit he was diagnosed with a LE DVT and started on anticoagulation. Today he has no complaints. He is feeling stronger and hopes he will be discharged from the SNF soon.       Review of Systems   Constitution: Negative for chills, decreased appetite, fever, malaise/fatigue, night sweats, weight gain and weight loss.   HENT: Negative for congestion, ear pain, hearing loss, hoarse voice, sore throat and tinnitus.    Eyes: Negative for blurred vision, redness and visual disturbance.   Cardiovascular: Positive for leg swelling. Negative for chest pain and palpitations.   Respiratory: Negative for cough, hemoptysis, shortness of breath and sputum production.    Hematologic/Lymphatic: Negative for adenopathy. Does not bruise/bleed easily.   Skin: Negative for dry skin, itching, rash and suspicious lesions.   Musculoskeletal: Negative for back pain, joint pain, myalgias and neck pain.   Gastrointestinal: Negative for abdominal pain, constipation, diarrhea, heartburn, nausea and vomiting.   Genitourinary: Negative for dysuria, flank pain, frequency, hematuria, hesitancy and urgency.   Neurological: Negative for dizziness, headaches, numbness, paresthesias and weakness.   Psychiatric/Behavioral: Negative for depression and memory loss. The patient does not have insomnia and is not nervous/anxious.      Objective:   Physical Exam  Vitals signs reviewed.   Constitutional:       Appearance: He is well-developed.   HENT:       Head: Normocephalic and atraumatic.      Right Ear: External ear normal.      Left Ear: External ear normal.   Eyes:      Conjunctiva/sclera: Conjunctivae normal.   Neck:      Musculoskeletal: Normal range of motion and neck supple.      Thyroid: No thyromegaly.   Cardiovascular:      Rate and Rhythm: Normal rate and regular rhythm.      Heart sounds: Normal heart sounds. No murmur.   Pulmonary:      Effort: Pulmonary effort is normal.      Breath sounds: Normal breath sounds. No wheezing or rales.   Abdominal:      General: Bowel sounds are normal.      Palpations: Abdomen is soft. There is no mass.      Tenderness: There is no abdominal tenderness. There is no rebound.   Musculoskeletal: Normal range of motion.      Right lower leg: Edema (2+) present.      Left lower leg: Edema (2+) present.   Lymphadenopathy:      Cervical: No cervical adenopathy.   Skin:     General: Skin is warm and dry.   Neurological:      Mental Status: He is alert and oriented to person, place, and time.   Psychiatric:         Mood and Affect: Affect is angry.         Behavior: Behavior normal.       Assessment:       1. 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 Had ophthalmologic examination which per patient report did not find any evidence of fungal eye disease.   · OK to discontinue fluconazole 200 mg PO daily. (Already done by provider at [facility])  · If recurrent fungemia then will need further work up.   · RTC as needed.

## 2020-10-16 ENCOUNTER — HOSPITAL ENCOUNTER (EMERGENCY)
Facility: HOSPITAL | Age: 65
Discharge: HOME | End: 2020-10-17
Attending: EMERGENCY MEDICINE
Payer: MEDICARE

## 2020-10-16 DIAGNOSIS — R30.0 DYSURIA: ICD-10-CM

## 2020-10-16 DIAGNOSIS — R31.9 HEMATURIA, UNSPECIFIED TYPE: Primary | ICD-10-CM

## 2020-10-16 DIAGNOSIS — N18.6 END STAGE RENAL DISEASE: ICD-10-CM

## 2020-10-16 DIAGNOSIS — N40.0 BENIGN PROSTATIC HYPERPLASIA, UNSPECIFIED WHETHER LOWER URINARY TRACT SYMPTOMS PRESENT: ICD-10-CM

## 2020-10-16 PROCEDURE — 36000 PLACE NEEDLE IN VEIN: CPT

## 2020-10-16 PROCEDURE — 99283 EMERGENCY DEPT VISIT LOW MDM: CPT | Mod: 25

## 2020-10-16 PROCEDURE — 80053 COMPREHEN METABOLIC PANEL: CPT

## 2020-10-16 PROCEDURE — 51798 US URINE CAPACITY MEASURE: CPT

## 2020-10-16 PROCEDURE — 85025 COMPLETE CBC W/AUTO DIFF WBC: CPT

## 2020-10-17 VITALS
OXYGEN SATURATION: 100 % | DIASTOLIC BLOOD PRESSURE: 83 MMHG | SYSTOLIC BLOOD PRESSURE: 169 MMHG | TEMPERATURE: 99 F | HEART RATE: 81 BPM | RESPIRATION RATE: 18 BRPM | WEIGHT: 190 LBS | BODY MASS INDEX: 28.89 KG/M2

## 2020-10-17 LAB
ALBUMIN SERPL BCP-MCNC: 2.5 G/DL (ref 3.5–5.2)
ALP SERPL-CCNC: 84 U/L (ref 55–135)
ALT SERPL W/O P-5'-P-CCNC: 21 U/L (ref 10–44)
ANION GAP SERPL CALC-SCNC: 9 MMOL/L (ref 8–16)
AST SERPL-CCNC: 15 U/L (ref 10–40)
BACTERIA #/AREA URNS HPF: ABNORMAL /HPF
BASOPHILS # BLD AUTO: 0.02 K/UL (ref 0–0.2)
BASOPHILS NFR BLD: 0.5 % (ref 0–1.9)
BILIRUB SERPL-MCNC: 0.2 MG/DL (ref 0.1–1)
BILIRUB UR QL STRIP: NEGATIVE
BUN SERPL-MCNC: 35 MG/DL (ref 8–23)
CALCIUM SERPL-MCNC: 7.8 MG/DL (ref 8.7–10.5)
CHLORIDE SERPL-SCNC: 103 MMOL/L (ref 95–110)
CLARITY UR: ABNORMAL
CO2 SERPL-SCNC: 28 MMOL/L (ref 23–29)
COLOR UR: YELLOW
CREAT SERPL-MCNC: 3.5 MG/DL (ref 0.5–1.4)
DIFFERENTIAL METHOD: ABNORMAL
EOSINOPHIL # BLD AUTO: 0.2 K/UL (ref 0–0.5)
EOSINOPHIL NFR BLD: 4.7 % (ref 0–8)
ERYTHROCYTE [DISTWIDTH] IN BLOOD BY AUTOMATED COUNT: 15.5 % (ref 11.5–14.5)
EST. GFR  (AFRICAN AMERICAN): 20 ML/MIN/1.73 M^2
EST. GFR  (NON AFRICAN AMERICAN): 17 ML/MIN/1.73 M^2
GLUCOSE SERPL-MCNC: 97 MG/DL (ref 70–110)
GLUCOSE UR QL STRIP: ABNORMAL
HCT VFR BLD AUTO: 23.1 % (ref 40–54)
HGB BLD-MCNC: 7.7 G/DL (ref 14–18)
HGB UR QL STRIP: ABNORMAL
HYALINE CASTS #/AREA URNS LPF: 0 /LPF
IMM GRANULOCYTES # BLD AUTO: 0.01 K/UL (ref 0–0.04)
IMM GRANULOCYTES NFR BLD AUTO: 0.3 % (ref 0–0.5)
KETONES UR QL STRIP: NEGATIVE
LEUKOCYTE ESTERASE UR QL STRIP: NEGATIVE
LYMPHOCYTES # BLD AUTO: 1 K/UL (ref 1–4.8)
LYMPHOCYTES NFR BLD: 25.5 % (ref 18–48)
MCH RBC QN AUTO: 25.5 PG (ref 27–31)
MCHC RBC AUTO-ENTMCNC: 33.3 G/DL (ref 32–36)
MCV RBC AUTO: 77 FL (ref 82–98)
MICROSCOPIC COMMENT: ABNORMAL
MONOCYTES # BLD AUTO: 0.5 K/UL (ref 0.3–1)
MONOCYTES NFR BLD: 13 % (ref 4–15)
NEUTROPHILS # BLD AUTO: 2.2 K/UL (ref 1.8–7.7)
NEUTROPHILS NFR BLD: 56 % (ref 38–73)
NITRITE UR QL STRIP: NEGATIVE
NRBC BLD-RTO: 0 /100 WBC
PH UR STRIP: 7 [PH] (ref 5–8)
PLATELET # BLD AUTO: 137 K/UL (ref 150–350)
PMV BLD AUTO: 9.2 FL (ref 9.2–12.9)
POTASSIUM SERPL-SCNC: 3.5 MMOL/L (ref 3.5–5.1)
PROT SERPL-MCNC: 5.7 G/DL (ref 6–8.4)
PROT UR QL STRIP: ABNORMAL
RBC # BLD AUTO: 3.02 M/UL (ref 4.6–6.2)
RBC #/AREA URNS HPF: 5 /HPF (ref 0–4)
SODIUM SERPL-SCNC: 140 MMOL/L (ref 136–145)
SP GR UR STRIP: 1.02 (ref 1–1.03)
URN SPEC COLLECT METH UR: ABNORMAL
UROBILINOGEN UR STRIP-ACNC: NEGATIVE EU/DL
WBC # BLD AUTO: 3.84 K/UL (ref 3.9–12.7)
WBC #/AREA URNS HPF: 0 /HPF (ref 0–5)

## 2020-10-17 PROCEDURE — 25000003 PHARM REV CODE 250: Performed by: EMERGENCY MEDICINE

## 2020-10-17 PROCEDURE — 81000 URINALYSIS NONAUTO W/SCOPE: CPT

## 2020-10-17 RX ORDER — CIPROFLOXACIN 500 MG/1
500 TABLET ORAL DAILY
Qty: 10 TABLET | Refills: 0 | Status: SHIPPED | OUTPATIENT
Start: 2020-10-17 | End: 2020-10-27

## 2020-10-17 RX ORDER — CIPROFLOXACIN 500 MG/1
500 TABLET ORAL
Status: COMPLETED | OUTPATIENT
Start: 2020-10-17 | End: 2020-10-17

## 2020-10-17 RX ADMIN — CIPROFLOXACIN 500 MG: 500 TABLET, FILM COATED ORAL at 04:10

## 2020-10-17 NOTE — ED PROVIDER NOTES
Encounter Date: 10/16/2020       History     Chief Complaint   Patient presents with    Hematuria     hx ESRD, dialysis (MWF). [facility]. reported 20-30cc blood-tinged urine. full code, aaox4     Patient presents for evaluation of gross hematuria.  Patient admits to discomfort with urination that started yesterday.  Patient is is on anticoagulation for previous DVT.  He is also taking Plavix for history of CVA.  He denies abdominal pain.  No flank pain.  No testicular swelling or pain.  No fever.  No nausea or vomiting.  No previous history hematuria.  He is not bleeding from any other sources.  Denies GI bleeding.  No recent bruising.  No epistaxis.        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 Tay    CATARACT EXTRACTION W/  INTRAOCULAR LENS IMPLANT Left 10/19/2017    Dr. Tay    ESOPHAGOGASTRODUODENOSCOPY N/A 8/17/2020    Procedure: EGD (ESOPHAGOGASTRODUODENOSCOPY);  Surgeon: Desmond Chapman MD;  Location: [facility];  Service: Endoscopy;  Laterality: N/A;    FEMORAL ARTERY STENT      KNEE SURGERY      bilateral scope     Family History   Problem Relation Age of Onset    Diabetes Mother     Heart disease Mother     Hypertension Mother     Cataracts Mother     No Known Problems Father     Hypertension Sister     No Known Problems Brother     No Known Problems Daughter     No Known Problems Maternal Aunt     No Known Problems Maternal Uncle     No Known Problems Paternal Aunt     No Known Problems Paternal Uncle     No Known Problems Maternal Grandmother      No Known Problems Maternal Grandfather     No Known Problems Paternal Grandmother     No Known Problems Paternal Grandfather     Amblyopia Neg Hx     Blindness Neg Hx     Cancer Neg Hx     Glaucoma Neg Hx     Macular degeneration Neg Hx     Retinal detachment Neg Hx     Strabismus Neg Hx     Stroke Neg Hx     Thyroid disease Neg Hx      Social History     Tobacco Use    Smoking status: Never Smoker    Smokeless tobacco: Never Used   Substance Use Topics    Alcohol use: No    Drug use: No     Review of Systems   Constitutional: Negative for chills, diaphoresis and fever.   HENT: Negative for congestion and sore throat.    Eyes: Negative.  Negative for visual disturbance.   Respiratory: Negative for cough and shortness of breath.    Cardiovascular: Negative for chest pain.   Gastrointestinal: Negative for abdominal pain, blood in stool, diarrhea, nausea and vomiting.        NO melena or rectal bleeding   Genitourinary: Positive for dysuria and hematuria. Negative for flank pain, frequency, scrotal swelling and testicular pain.   Musculoskeletal: Negative for back pain.   Skin: Negative for rash.   Neurological: Negative for headaches.        No numbness   Psychiatric/Behavioral: Negative for confusion.   All other systems reviewed and are negative.      Physical Exam     Initial Vitals [10/16/20 2210]   BP Pulse Resp Temp SpO2   (!) 165/85 86 17 98.7 °F (37.1 °C) 100 %      MAP       --         Physical Exam    Nursing note and vitals reviewed.  Constitutional: He appears well-developed and well-nourished. He is not diaphoretic. No distress.   HENT:   Head: Normocephalic and atraumatic.   Right Ear: External ear normal.   Left Ear: External ear normal.   Nose: Nose normal.   Eyes: Conjunctivae and EOM are normal. Pupils are equal, round, and reactive to light. Right eye exhibits no discharge. Left eye exhibits no discharge.   Neck: Neck supple. No tracheal deviation present.   Cardiovascular: Normal  rate, regular rhythm and normal heart sounds.   No murmur heard.  Pulmonary/Chest: Breath sounds normal. No stridor. No respiratory distress. He has no wheezes. He has no rhonchi. He has no rales.   Abdominal: Soft. He exhibits no distension. There is no abdominal tenderness. There is no rebound and no guarding.   No flank tenderness.   Genitourinary:    Penis normal.      Genitourinary Comments: Normal scrotum and testes.     Musculoskeletal: Normal range of motion. No tenderness or edema.   Neurological: He is alert and oriented to person, place, and time. No cranial nerve deficit. GCS score is 15. GCS eye subscore is 4. GCS verbal subscore is 5. GCS motor subscore is 6.   Chronic right hemiparesis that is unchanged.  Normal strength left upper extremity and left lower extremity.   Skin: Skin is warm and dry. No rash noted. No pallor.   Psychiatric: He has a normal mood and affect. His behavior is normal. Judgment and thought content normal.         ED Course   Procedures  Labs Reviewed   CBC W/ AUTO DIFFERENTIAL - Abnormal; Notable for the following components:       Result Value    WBC 3.84 (*)     RBC 3.02 (*)     Hemoglobin 7.7 (*)     Hematocrit 23.1 (*)     Mean Corpuscular Volume 77 (*)     Mean Corpuscular Hemoglobin 25.5 (*)     RDW 15.5 (*)     Platelets 137 (*)     All other components within normal limits   COMPREHENSIVE METABOLIC PANEL - Abnormal; Notable for the following components:    BUN, Bld 35 (*)     Creatinine 3.5 (*)     Calcium 7.8 (*)     Total Protein 5.7 (*)     Albumin 2.5 (*)     eGFR if  20 (*)     eGFR if non  17 (*)     All other components within normal limits   URINALYSIS, REFLEX TO URINE CULTURE - Abnormal; Notable for the following components:    Appearance, UA Hazy (*)     Protein, UA 3+ (*)     Glucose, UA 2+ (*)     Occult Blood UA 2+ (*)     All other components within normal limits    Narrative:     Specimen Source->Urine   URINALYSIS  MICROSCOPIC - Abnormal; Notable for the following components:    RBC, UA 5 (*)     All other components within normal limits    Narrative:     Specimen Source->Urine          Imaging Results    None          Medical Decision Making:   Initial Assessment:   Patient presents for evaluation of dysuria hematuria.  Patient is on anticoagulants.  He denies abdominal pain or flank pain.  Patient is afebrile.  Patient recently started dialysis.  He is still making urine.  Will assess for UTI.  Will check baseline lab work.  Differential Diagnosis:   Medication side effect, UTI  Clinical Tests:   Lab Tests: Ordered and Reviewed  ED Management:  0330:  No significant hematuria on urinalysis.  Patient is difficulty emptying his bladder.  History of BPH.  Complains of dysuria.  Currently on Flomax.  No bacteria or pyuria on urinalysis.  No nitrites.  White blood cell count is normal.  Patient is afebrile.  Will place on antibiotics for possible early prostatitis given that he is symptomatic.  Will continue current medications.  No evidence significant blood loss from hematuria.  Patient to follow-up with urology if symptoms persist or new symptoms develop.                             Clinical Impression:     ICD-10-CM ICD-9-CM   1. Hematuria, unspecified type  R31.9 599.70   2. Dysuria  R30.0 788.1   3. Benign prostatic hyperplasia, unspecified whether lower urinary tract symptoms present  N40.0 600.00   4. End stage renal disease  N18.6 585.6                      Disposition:   Disposition: Discharged  Condition: Stable     ED Disposition Condition    Discharge Stable        ED Prescriptions     Medication Sig Dispense Start Date End Date Auth. Provider    ciprofloxacin HCl (CIPRO) 500 MG tablet Take 1 tablet (500 mg total) by mouth once daily. for 10 days 10 tablet 10/17/2020 10/27/2020 Hans Flores MD        Follow-up Information    None                                      Hans Flores MD  10/17/20 0339

## 2020-10-17 NOTE — ED TRIAGE NOTES
Patient arrived via EMS with c/o hematuria, dysuria, and urinary frequency.  Denies n/v, abd pain, back pain, or fever.

## 2020-10-17 NOTE — ED NOTES
Instructed patient on urine specimen collection and patient verbalized understanding of instructions.  Patient unable to urinate at this time.

## 2020-10-28 PROBLEM — I82.431 ACUTE DEEP VEIN THROMBOSIS (DVT) OF POPLITEAL VEIN OF RIGHT LOWER EXTREMITY: Status: ACTIVE | Noted: 2020-09-21

## 2020-11-18 NOTE — PROGRESS NOTES
This note was created by combination of typed  and M-Modal dictation.  Transcription errors may be present.  If there are any questions, please contact me.    Assessment and Plan:   Acute deep vein thrombosis (DVT) of popliteal vein of right lower extremity on outside RLE  9/21/2020, eliquis  -unprovoked.  On Eliquis.  Plan for least 3 months continuous anticoagulation  Check PSA today  Is in need of colonoscopy.  Previously had been troubles trying to get colon cancer screening because he lives alone and because of his stroke with hemiparesis, dexterity is an issue in collecting a stool sample  Ultimately he should get a colonoscopy, plan to readdress maybe in January.  -     apixaban (ELIQUIS) 5 mg Tab; Take 1 tablet (5 mg total) by mouth 2 (two) times daily.  Dispense: 180 tablet; Refill: 3    Benign essential HTN; ACEI hyperK  -blood pressure was high today, he reports that he did not take any of his medicines this morning.  For now no change.    History of stroke  -with hemiplegia.    Pure hypercholesterolemia  -on high-dose statin    ESRD (end stage renal disease)  Secondary hyperparathyroidism of renal origin  -dialysis MWF in [place]. Nephrologist I believe is Dr. Kush Vera    Controlled type 2 diabetes mellitus with chronic kidney disease on chronic dialysis, with long-term current use of insulin  DM type 2 without retinopathy  -reports he was getting Levemir while in [facility] and novolog sliding scale, was discharged with both levemir and novolog without clear instruction. He has been taking novolog and not levemir since discharge  a1c has been always good  Stop taking novolog. Change back to levemir 15 units nightly.  -     insulin detemir U-100 (LEVEMIR FLEXTOUCH U-100 INSULN) 100 unit/mL (3 mL) InPn pen; Inject 15 Units into the skin every evening. STOP NOVOLOG  Dispense: 1 Box; Refill: 11    Benign non-nodular prostatic hyperplasia without lower urinary tract symptoms  -refilled  flomax.  -     tamsulosin (FLOMAX) 0.4 mg Cap; Take 2 capsules (0.8 mg total) by mouth once daily.  Dispense: 180 capsule; Refill: 3    Screening for prostate cancer  -check PSA  -     PSA, Screening; Future; Expected date: 11/19/2020    Microhematuria  -was seen in ER and treated for presumed prostatitis.  UA micro with 5 RBC  Referral to urology  -     Ambulatory referral/consult to Urology; Future; Expected date: 11/26/2020          Medications Discontinued During This Encounter   Medication Reason    tamsulosin (FLOMAX) 0.4 mg Cap Reorder    insulin detemir U-100 (LEVEMIR FLEXTOUCH U-100 INSULN) 100 unit/mL (3 mL) InPn pen     apixaban (ELIQUIS) 5 mg Tab Reorder       meds sent this encounter:  Medications Ordered This Encounter   Medications    apixaban (ELIQUIS) 5 mg Tab     Sig: Take 1 tablet (5 mg total) by mouth 2 (two) times daily.     Dispense:  180 tablet     Refill:  3    insulin detemir U-100 (LEVEMIR FLEXTOUCH U-100 INSULN) 100 unit/mL (3 mL) InPn pen     Sig: Inject 15 Units into the skin every evening. STOP NOVOLOG     Dispense:  1 Box     Refill:  11     Stop novolog    tamsulosin (FLOMAX) 0.4 mg Cap     Sig: Take 2 capsules (0.8 mg total) by mouth once daily.     Dispense:  180 capsule     Refill:  3       Follow Up: No follow-ups on file. plan to follow up 1/2021    Subjective:     Chief Complaint   Patient presents with    Diabetes    Hypertension    Hyperlipidemia       ELADIA Rivera is a 65 y.o. male, last appointment with this clinic was 8/13/2020.    Hospitalization 8/15 through 9/1  Overview/Hospital Course: 65 y.o. AAM with history significant for DMII, HTN, stroke, and CKD4 presents for evaluation of worsening CKD sent in by provider. Creatine = 2.0 1 year ago now with creatine 5.4 and potassium 5.8 worsening swelling and anasarca 2-3+ edema up to abdomen and flank, activity intolerance and orthopnea X 5 pillows. He is not on hemodialysis.  Amlodipine stopped.  Worsening BP and  started on Hydralazine.  Nephrology consulted.  BP improved, but worsening Creat with persistent hyperkalemia.  Patient will need HD.  IR consulted for HD catheter.  Temporary HD catheter placed on 8/20 and patient underwent HD.  Catheter switched to tunneled catheter on 8/21.  Patient with right sided hemiplegia from previous stroke.  Deconditioned and PT/OT consulted.  Initial recommendation for SNF.  Patient will need outpatient HD arrangements and SW consulted.  Patient started spiking fevers overnight 8/25.  Unremarkable CXR.  BCx and UA obtained.  Candida parapsilosis infection  -Found to have candidemia - source unclear  -infectious disease consulted, Started on micafungin and now converted to fluconazole  -Repeat cultures negative so far  -Dialysis line removed 8/28.   line replaced on 08/31 after line holiday.  -end date of fluconazole will be 09/11/2020.  Patient has ophthalmology follow-up scheduled on 09/08/2020. Tentative end date 9/11/2020 If no endophthalmitis. If noted to have endophthalmitis during optho visit, would need at least 4-6 weeks of treatment    8/15/20 US: Prostatomegaly.  Some of the provided images are suggestive of a distinct hyperechoic component of the prostate gland, of uncertain etiology or significance, and potentially artifactual.  Dedicated prostate ultrasound or MRI may be of benefit for further characterization.    ER visit mid September for leg swelling.  Had had an ultrasound done earlier that day showing right lower extremity nonocclusive DVT in the right popliteal femoral vein. Started on eliquis.    Most recent ER visit in mid October for gross hematuria.  In the setting of anticoagulation for DVT.  As well as being on Plavix.  Treat for possible prostatitis.  Urinalysis at the ER showing occult blood but on microscopy RBC 5.    Here alone.  Brother drove him here.    Was at [facility] for about 2 months for rehab  Returned to home last week  Walking some but needs to build  up strength.    Did not take any meds this AM. BP high on intake.     Taking insulin - discharged on both levemir and novolog. Taking 8 units novolog. levemir not taking.   Thinks he was getting levemir 15 at night while in [facility]. And thinks he was getting novolog sliding scale? Was not consistent  Prior to hospitalization was on levemir 16  He checks glucose every other day he states  Denies hypoglycemia    The DVT sounds like it was unprovoked.  DVT in the right leg which is the leg with hemiplegia.  This was while he was at [facility].  Does not recall any prior trauma or fall or injury to the leg.  Need to treat this as unprovoked DVT.  Had previously wanted him to get colon cancer screening but difficulties with obtaining stool sample for blood because of dexterity issues.  Lives alone and no one to assist him in collecting stool sample.  Discussed with him that I will probably need him to get a colonoscopy done by would like to have him have at least 3 months of continuous anticoagulation 1st    Dialyzes Monday Wednesday Friday in [place].  Does not know the name of his nephrologist.  Thinks he has 2.  One is a man 1 is a woman.    Patient Care Team:  Raciel Raymond MD as PCP - General (Internal Medicine)  Gabriella Manjarrez DPM as Consulting Physician (Podiatry)  Mac Chambers Jr., MD as Consulting Physician (Urology)  Geovany Rucker MD as Consulting Physician (Neurology)  Bob Tay MD as Consulting Physician (Ophthalmology)  Tres Reyna MA as Care Coordinator    Patient Active Problem List    Diagnosis Date Noted    Acute deep vein thrombosis (DVT) of popliteal vein of right lower extremity on outside RLE US 9/21/2020, eliquis 09/21/2020    Candida parapsilosis infection while hospitalized 8/2020 08/29/2020     -Found to have candidemia - source unclear  -infectious disease consulted, Started on micafungin and now converted to fluconazole  -Repeat cultures negative so far  -Dialysis  line removed 8/28.   line replaced on 08/31 after line holiday.  -end date of fluconazole will be 09/11/2020.  Patient has ophthalmology follow-up scheduled on 09/08/2020. Tentative end date 9/11/2020 If no endophthalmitis. If noted to have endophthalmitis during optho visit, would need at least 4-6 weeks of treatment      Nephrotic syndrome 08/16/2020    Anasarca 08/16/2020    Hypocalcemia 08/16/2020    Enlarged prostate 08/16/2020    Anemia 08/16/2020    History of stroke 12/04/2017    CME (cystoid macular edema), bilateral 11/16/2017    Refractive error 11/16/2017    Post-operative state 10/06/2017    Senile nuclear sclerosis 10/05/2017    Right sided weakness 09/11/2017    Secondary hyperparathyroidism of renal origin 08/03/2017    Vitamin D deficiency 08/03/2017    Impaired functional mobility, balance, gait, and endurance 06/23/2017    Nuclear sclerosis, left 06/09/2017    Cortical cataract of both eyes 06/09/2017    DM type 2 without retinopathy 06/09/2017    Microcytosis 9/2019 labs c/w AoCD and AoCKD 03/22/2017     Seen on admission as well 2015; normal Hb, low MCV.  Normal RDW  08/17/2020 EGD normal esophagus.  Discolored nodular and texture change mucosa in the antrum.  Normal duodenum.  Path with foveolar hyperplasia and early xanthoma formation.  H pylori negative.  Mild chronic inflammation without acute activity      ESRD (end stage renal disease) 03/22/2017 2/27/20 renal US with dopplers no ALF.  Medical renal disease  8/2020 renal US: 1. Symmetric diffusely increased cortical echogenicity and elevated resistive indices, nonspecific indicators of chronic medical renal disease.  2. Prostatomegaly.  Some of the provided images are suggestive of a distinct hyperechoic component of the prostate gland, of uncertain etiology or significance, and potentially artifactual.  Dedicated prostate ultrasound or MRI may be of benefit for further characterization.    Started on HD while  hospitalized for progression to ESRD 8/2020  -Continue dialysis per nephrology  -Outpatient HD has been arranged  -Had planned discharge 8/27 if remained afebrile and cultures negative.  Unfortunately his blood culture grew Candida parapsilosis  -Dr. Gomez with ID consulted and case discussed with him.  Started micafungin 8/27 and now on fluconazole.  -Dialysis line removed after HD 8/28 and plan line holiday over weekend.  -new line by IR on 8/31, tolerated dialysis fluid.  -continue outpatient dialysis.      Benign essential HTN; ACEI hyperK 03/21/2017     -Norvasc stopped due to lower extremity edema.      Pure hypercholesterolemia 03/21/2017    Controlled type 2 diabetes mellitus with stage 4 chronic kidney disease, with long-term current use of insulin 03/21/2017    Benign non-nodular prostatic hyperplasia without lower urinary tract symptoms 03/21/2017 6/26/2017 renal US mod prostatomegaly.      Seizure disorder as sequela of cerebrovascular accident 03/21/2017    Hemiplegia and hemiparesis following cerebral infarction affecting right dominant side 03/21/2017       PAST MEDICAL HISTORY:  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    Stroke     Urinary tract infection        PAST SURGICAL HISTORY:  Past Surgical History:   Procedure Laterality Date    CATARACT EXTRACTION W/  INTRAOCULAR LENS IMPLANT Right 10/05/2017    Dr. Tay    CATARACT EXTRACTION W/  INTRAOCULAR LENS IMPLANT Left 10/19/2017    Dr. Tay    ESOPHAGOGASTRODUODENOSCOPY N/A 8/17/2020    Procedure: EGD (ESOPHAGOGASTRODUODENOSCOPY);  Surgeon: Desmond Chapman MD;  Location: [facility];  Service: Endoscopy;  Laterality: N/A;    FEMORAL ARTERY STENT      KNEE SURGERY      bilateral scope       SOCIAL HISTORY:  Social History     Socioeconomic History    Marital status: Single     Spouse name: Not on file    Number of children:  Not on file    Years of education: Not on file    Highest education level: Not on file   Occupational History    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, etc   Social Needs    Financial resource strain: Not on file    Food insecurity     Worry: Not on file     Inability: Not on file    Transportation needs     Medical: Not on file     Non-medical: Not on file   Tobacco Use    Smoking status: Never Smoker    Smokeless tobacco: Never Used   Substance and Sexual Activity    Alcohol use: No    Drug use: No    Sexual activity: Not on file   Lifestyle    Physical activity     Days per week: Not on file     Minutes per session: Not on file    Stress: Not on file   Relationships    Social connections     Talks on phone: Not on file     Gets together: Not on file     Attends [employer] service: Not on file     Active member of club or organization: Not on file     Attends meetings of clubs or organizations: Not on file     Relationship status: Not on file   Other Topics Concern    Not on file   Social History Narrative    Not on file       ALLERGIES AND MEDICATIONS: updated and reviewed.  Review of patient's allergies indicates:   Allergen Reactions    Ace inhibitors      Hyperkalemia 8/2018    Penicillins Hives    Tizanidine      Felt hot       Medication List with Changes/Refills   Current Medications    AMLODIPINE (NORVASC) 10 MG TABLET    Take 1 tablet (10 mg total) by mouth once daily.    APIXABAN (ELIQUIS) 5 MG TAB    Take 1 tablet (5 mg total) by mouth 2 (two) times daily.    ATORVASTATIN (LIPITOR) 80 MG TABLET    Take 1 tablet (80 mg total) by mouth once daily.    BLOOD-GLUCOSE METER MISC    1 each by Misc.(Non-Drug; Combo Route) route once daily. Pharmacy-whichever brand specified by insurance    CALCITRIOL (ROCALTROL) 0.25 MCG CAP    Take 1 capsule (0.25 mcg total) by mouth once daily.    CHOLECALCIFEROL, VITAMIN D3, (VITAMIN D3) 1,000 UNIT CAPSULE    Take 1 capsule (1,000 Units  "total) by mouth once daily.    CLOPIDOGREL (PLAVIX) 75 MG TABLET    Take 1 tablet (75 mg total) by mouth once daily.    FERROUS GLUCONATE 324 MG (37.5 MG IRON) TAB TABLET    Take 1 tablet (324 mg total) by mouth 2 (two) times daily with meals.    FOLIC ACID (FOLVITE) 1 MG TABLET    Take 1 tablet (1 mg total) by mouth once daily.    HYDRALAZINE (APRESOLINE) 25 MG TABLET    Take 1 tablet (25 mg total) by mouth every 8 (eight) hours.    INSULIN ASPART U-100 (NOVOLOG) 100 UNIT/ML INJECTION    Inject into the skin 3 (three) times daily before meals. Take 8 units in am    INSULIN DETEMIR U-100 (LEVEMIR FLEXTOUCH U-100 INSULN) 100 UNIT/ML (3 ML) INPN PEN    Inject 16 Units into the skin every evening. STOP NOVOLOG    LABETALOL (NORMODYNE) 100 MG TABLET    Take 1 tablet (100 mg total) by mouth every 12 (twelve) hours.    LANCETS (ONETOUCH DELICA LANCETS) 33 GAUGE MISC    1 lancet by Misc.(Non-Drug; Combo Route) route once daily. ONCE DAILY BLOOD SUGAR TESTING; WHICHEVER BRAND COVERED BY INSURANCE    PEN NEEDLE, DIABETIC 31 GAUGE X 1/4" NDLE    For mealtime insulin    PHENYTOIN (DILANTIN) 100 MG ER CAPSULE    Take 1 capsule (100 mg total) by mouth 3 (three) times daily.    RENVELA 800 MG TAB        TAMSULOSIN (FLOMAX) 0.4 MG CAP    Take 2 capsules (0.8 mg total) by mouth once daily.        Review of Systems   Constitutional: Negative for chills and fever.   Respiratory: Negative for cough and shortness of breath.    Cardiovascular: Negative for chest pain and palpitations.   Genitourinary: Negative for dysuria and flank pain.       Objective:   Physical Exam   Vitals:    11/19/20 0904   BP: (!) 184/96   BP Location: Left arm   Patient Position: Sitting   BP Method: Large (Manual)   Pulse: 80   Temp: 97.7 °F (36.5 °C)   TempSrc: Temporal   SpO2: 98%   Height: 5' 8" (1.727 m)    Body mass index is 28.89 kg/m².      Height: 5' 8" (172.7 cm)     Physical Exam  Constitutional:       General: He is not in acute distress.     " Appearance: He is well-developed.   Cardiovascular:      Rate and Rhythm: Normal rate and regular rhythm.      Heart sounds: Normal heart sounds. No murmur.   Pulmonary:      Effort: Pulmonary effort is normal.      Breath sounds: Normal breath sounds.   Musculoskeletal: Normal range of motion.      Right lower leg: Edema present.      Left lower leg: Edema present.   Skin:     General: Skin is warm and dry.   Neurological:      Mental Status: He is alert and oriented to person, place, and time.      Coordination: Coordination abnormal.      Comments: Right dense hemiplegia, baseline   Psychiatric:         Behavior: Behavior normal.         Thought Content: Thought content normal.

## 2020-11-19 ENCOUNTER — OFFICE VISIT (OUTPATIENT)
Dept: FAMILY MEDICINE | Facility: CLINIC | Age: 65
End: 2020-11-19
Payer: MEDICARE

## 2020-11-19 ENCOUNTER — LAB VISIT (OUTPATIENT)
Dept: LAB | Facility: HOSPITAL | Age: 65
End: 2020-11-19
Attending: INTERNAL MEDICINE
Payer: MEDICARE

## 2020-11-19 VITALS
SYSTOLIC BLOOD PRESSURE: 184 MMHG | HEIGHT: 68 IN | BODY MASS INDEX: 28.89 KG/M2 | HEART RATE: 80 BPM | TEMPERATURE: 98 F | DIASTOLIC BLOOD PRESSURE: 96 MMHG | OXYGEN SATURATION: 98 %

## 2020-11-19 DIAGNOSIS — Z79.4 CONTROLLED TYPE 2 DIABETES MELLITUS WITH CHRONIC KIDNEY DISEASE ON CHRONIC DIALYSIS, WITH LONG-TERM CURRENT USE OF INSULIN: ICD-10-CM

## 2020-11-19 DIAGNOSIS — N18.6 ESRD (END STAGE RENAL DISEASE): ICD-10-CM

## 2020-11-19 DIAGNOSIS — E78.00 PURE HYPERCHOLESTEROLEMIA: Chronic | ICD-10-CM

## 2020-11-19 DIAGNOSIS — Z86.73 HISTORY OF STROKE: ICD-10-CM

## 2020-11-19 DIAGNOSIS — Z99.2 CONTROLLED TYPE 2 DIABETES MELLITUS WITH CHRONIC KIDNEY DISEASE ON CHRONIC DIALYSIS, WITH LONG-TERM CURRENT USE OF INSULIN: ICD-10-CM

## 2020-11-19 DIAGNOSIS — N18.6 CONTROLLED TYPE 2 DIABETES MELLITUS WITH CHRONIC KIDNEY DISEASE ON CHRONIC DIALYSIS, WITH LONG-TERM CURRENT USE OF INSULIN: ICD-10-CM

## 2020-11-19 DIAGNOSIS — E11.9 DM TYPE 2 WITHOUT RETINOPATHY: ICD-10-CM

## 2020-11-19 DIAGNOSIS — Z12.5 SCREENING FOR PROSTATE CANCER: ICD-10-CM

## 2020-11-19 DIAGNOSIS — E11.22 CONTROLLED TYPE 2 DIABETES MELLITUS WITH CHRONIC KIDNEY DISEASE ON CHRONIC DIALYSIS, WITH LONG-TERM CURRENT USE OF INSULIN: ICD-10-CM

## 2020-11-19 DIAGNOSIS — N40.0 BENIGN NON-NODULAR PROSTATIC HYPERPLASIA WITHOUT LOWER URINARY TRACT SYMPTOMS: Chronic | ICD-10-CM

## 2020-11-19 DIAGNOSIS — I10 BENIGN ESSENTIAL HTN: Chronic | ICD-10-CM

## 2020-11-19 DIAGNOSIS — R31.29 MICROHEMATURIA: ICD-10-CM

## 2020-11-19 DIAGNOSIS — N25.81 SECONDARY HYPERPARATHYROIDISM OF RENAL ORIGIN: ICD-10-CM

## 2020-11-19 DIAGNOSIS — I82.431 ACUTE DEEP VEIN THROMBOSIS (DVT) OF POPLITEAL VEIN OF RIGHT LOWER EXTREMITY: Primary | ICD-10-CM

## 2020-11-19 LAB — COMPLEXED PSA SERPL-MCNC: 10.2 NG/ML (ref 0–4)

## 2020-11-19 PROCEDURE — 99214 PR OFFICE/OUTPT VISIT, EST, LEVL IV, 30-39 MIN: ICD-10-PCS | Mod: S$PBB,,, | Performed by: INTERNAL MEDICINE

## 2020-11-19 PROCEDURE — 99214 OFFICE O/P EST MOD 30 MIN: CPT | Mod: PBBFAC,PO | Performed by: INTERNAL MEDICINE

## 2020-11-19 PROCEDURE — 36415 COLL VENOUS BLD VENIPUNCTURE: CPT | Mod: PO

## 2020-11-19 PROCEDURE — 99999 PR PBB SHADOW E&M-EST. PATIENT-LVL IV: ICD-10-PCS | Mod: PBBFAC,,, | Performed by: INTERNAL MEDICINE

## 2020-11-19 PROCEDURE — 84153 ASSAY OF PSA TOTAL: CPT

## 2020-11-19 PROCEDURE — 99999 PR PBB SHADOW E&M-EST. PATIENT-LVL IV: CPT | Mod: PBBFAC,,, | Performed by: INTERNAL MEDICINE

## 2020-11-19 PROCEDURE — 99214 OFFICE O/P EST MOD 30 MIN: CPT | Mod: S$PBB,,, | Performed by: INTERNAL MEDICINE

## 2020-11-19 RX ORDER — INSULIN DETEMIR 100 [IU]/ML
15 INJECTION, SOLUTION SUBCUTANEOUS NIGHTLY
Qty: 1 BOX | Refills: 11 | Status: ON HOLD | OUTPATIENT
Start: 2020-11-19 | End: 2021-01-11 | Stop reason: HOSPADM

## 2020-11-19 RX ORDER — TAMSULOSIN HYDROCHLORIDE 0.4 MG/1
2 CAPSULE ORAL DAILY
Qty: 180 CAPSULE | Refills: 3 | Status: SHIPPED | OUTPATIENT
Start: 2020-11-19 | End: 2021-12-16 | Stop reason: SDUPTHER

## 2020-11-19 RX ORDER — SEVELAMER CARBONATE 800 MG/1
TABLET, FILM COATED ORAL
COMMUNITY
Start: 2020-11-18 | End: 2021-12-16 | Stop reason: SDUPTHER

## 2020-11-19 RX ORDER — INSULIN ASPART 100 [IU]/ML
INJECTION, SOLUTION INTRAVENOUS; SUBCUTANEOUS
Status: ON HOLD | COMMUNITY
End: 2021-01-11 | Stop reason: HOSPADM

## 2020-11-25 ENCOUNTER — TELEPHONE (OUTPATIENT)
Dept: FAMILY MEDICINE | Facility: CLINIC | Age: 65
End: 2020-11-25

## 2020-11-25 NOTE — TELEPHONE ENCOUNTER
Patient's PSA was elevated at 10.2. Recommend referral to Urology for further evaluation. He already has an appointment with Urology on 12/3 for microhematuria. Patient was informed and verified understanding.

## 2020-12-08 NOTE — TELEPHONE ENCOUNTER
Please call pt - he missed his appointment with urology 12/3.  He needs to reschedule - very important.

## 2020-12-10 ENCOUNTER — OFFICE VISIT (OUTPATIENT)
Dept: PODIATRY | Facility: CLINIC | Age: 65
End: 2020-12-10
Payer: MEDICARE

## 2020-12-10 VITALS
BODY MASS INDEX: 28.8 KG/M2 | SYSTOLIC BLOOD PRESSURE: 154 MMHG | WEIGHT: 190.06 LBS | DIASTOLIC BLOOD PRESSURE: 92 MMHG | HEIGHT: 68 IN

## 2020-12-10 DIAGNOSIS — B35.1 ONYCHOMYCOSIS DUE TO DERMATOPHYTE: ICD-10-CM

## 2020-12-10 DIAGNOSIS — L84 PRE-ULCERATIVE CALLUSES: ICD-10-CM

## 2020-12-10 DIAGNOSIS — E11.49 TYPE II DIABETES MELLITUS WITH NEUROLOGICAL MANIFESTATIONS: Primary | ICD-10-CM

## 2020-12-10 DIAGNOSIS — Z86.73 HISTORY OF STROKE: ICD-10-CM

## 2020-12-10 PROCEDURE — 99499 UNLISTED E&M SERVICE: CPT | Mod: S$PBB,,, | Performed by: PODIATRIST

## 2020-12-10 PROCEDURE — 11055 PARING/CUTG B9 HYPRKER LES 1: CPT | Mod: Q9,S$PBB,, | Performed by: PODIATRIST

## 2020-12-10 PROCEDURE — 99999 PR PBB SHADOW E&M-EST. PATIENT-LVL III: CPT | Mod: PBBFAC,,, | Performed by: PODIATRIST

## 2020-12-10 PROCEDURE — 11055 PARING/CUTG B9 HYPRKER LES 1: CPT | Mod: PBBFAC,PO | Performed by: PODIATRIST

## 2020-12-10 PROCEDURE — 11721 PR DEBRIDEMENT OF NAILS, 6 OR MORE: ICD-10-PCS | Mod: Q9,59,S$PBB, | Performed by: PODIATRIST

## 2020-12-10 PROCEDURE — 99499 NO LOS: ICD-10-PCS | Mod: S$PBB,,, | Performed by: PODIATRIST

## 2020-12-10 PROCEDURE — 99213 OFFICE O/P EST LOW 20 MIN: CPT | Mod: PBBFAC,PO,25 | Performed by: PODIATRIST

## 2020-12-10 PROCEDURE — 11721 DEBRIDE NAIL 6 OR MORE: CPT | Mod: Q9,59,S$PBB, | Performed by: PODIATRIST

## 2020-12-10 PROCEDURE — 99999 PR PBB SHADOW E&M-EST. PATIENT-LVL III: ICD-10-PCS | Mod: PBBFAC,,, | Performed by: PODIATRIST

## 2020-12-10 PROCEDURE — 11721 DEBRIDE NAIL 6 OR MORE: CPT | Mod: PBBFAC,PO,59 | Performed by: PODIATRIST

## 2020-12-10 PROCEDURE — 11055 PR TRIM HYPERKERATOTIC SKIN LESION, ONE: ICD-10-PCS | Mod: Q9,S$PBB,, | Performed by: PODIATRIST

## 2020-12-11 ENCOUNTER — TELEPHONE (OUTPATIENT)
Dept: FAMILY MEDICINE | Facility: CLINIC | Age: 65
End: 2020-12-11

## 2020-12-11 NOTE — TELEPHONE ENCOUNTER
Left message on answering machine to return call to clinic. Need to reschedule Shingrix #2. Letter in the mail.

## 2020-12-14 ENCOUNTER — TELEPHONE (OUTPATIENT)
Dept: FAMILY MEDICINE | Facility: CLINIC | Age: 65
End: 2020-12-14

## 2020-12-14 NOTE — TELEPHONE ENCOUNTER
----- Message from Emmanuel Coles, Patient Care Assistant sent at 12/14/2020 11:22 AM CST -----  Name of Who is Calling: TRACI CHAO [[id number]]    What is the request in detail: Requesting several medications to be refilled. Please contact to further discuss and advise      Can the clinic reply by [facility]: No    What Number to Call Back if not in [facility]:   [phone]

## 2020-12-14 NOTE — TELEPHONE ENCOUNTER
Patient not at home and does not know names of medication he needs filled. Will call clinic when he has list

## 2020-12-15 DIAGNOSIS — G40.909 SEIZURE DISORDER AS SEQUELA OF CEREBROVASCULAR ACCIDENT: Chronic | ICD-10-CM

## 2020-12-15 DIAGNOSIS — I69.398 SEIZURE DISORDER AS SEQUELA OF CEREBROVASCULAR ACCIDENT: Chronic | ICD-10-CM

## 2020-12-15 DIAGNOSIS — E53.8 FOLIC ACID DEFICIENCY: ICD-10-CM

## 2020-12-15 DIAGNOSIS — I10 BENIGN ESSENTIAL HTN: Primary | ICD-10-CM

## 2020-12-15 RX ORDER — PHENYTOIN SODIUM 100 MG/1
100 CAPSULE, EXTENDED RELEASE ORAL 3 TIMES DAILY
Qty: 270 CAPSULE | Refills: 0 | Status: ON HOLD | OUTPATIENT
Start: 2020-12-15 | End: 2021-01-10 | Stop reason: HOSPADM

## 2020-12-15 RX ORDER — HYDRALAZINE HYDROCHLORIDE 25 MG/1
25 TABLET, FILM COATED ORAL EVERY 8 HOURS
Qty: 90 TABLET | Refills: 0 | Status: SHIPPED | OUTPATIENT
Start: 2020-12-15 | End: 2021-03-30 | Stop reason: SDUPTHER

## 2020-12-15 RX ORDER — LABETALOL 100 MG/1
100 TABLET, FILM COATED ORAL EVERY 12 HOURS
Qty: 60 TABLET | Refills: 0 | Status: SHIPPED | OUTPATIENT
Start: 2020-12-15 | End: 2021-03-30 | Stop reason: SDUPTHER

## 2020-12-15 RX ORDER — FOLIC ACID 1 MG/1
1 TABLET ORAL DAILY
Qty: 30 TABLET | Refills: 0 | Status: SHIPPED | OUTPATIENT
Start: 2020-12-15 | End: 2022-08-18

## 2020-12-15 NOTE — TELEPHONE ENCOUNTER
----- Message from Beth Martinez sent at 12/14/2020  3:58 PM CST -----  Regarding: sol with [facility] [phone]  Type: Patient Call Back    Who called:sol    What is the request in detail:requesting dilantin 100 mg three times a day capsule, folic acid 1 mg once a day, hydralazine 25 mg 1 every 8 hours, labetalol 100 mg 1 twice a day tablet.  Call sol. Pharmacy [facility] [phone] lauren    Can the clinic reply by [facility]?    Would the patient rather a call back or a response via [facility]? call    Best call back number:sol with [facility] [phone]    Additional Information:

## 2020-12-15 NOTE — PROGRESS NOTES
Subjective:      Patient ID: Miguel Moore is a 65 y.o. male.    Chief Complaint: Diabetes Mellitus (Dr Raymond PCP 11/19/20), Nail Care, and Nail Problem (left great toenail)    Miguel is a 65 y.o. male who presents to the clinic for evaluation and treatment of high risk feet. 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 The patient's chief complaint is long, thick toenails on both feet and calluses on toes of left foot. Routine trimming of these help keep symptoms under control. This patient has documented high risk feet requiring routine maintenance secondary to diabetes mellitis and those secondary complications of diabetes, as mentioned. No other major complaints today. Has hx of stroke and right sided weakness.Presents for diabetic foot risk assessment. No new issues.    PCP: Raciel Raymond MD    Date Last Seen by PCP:   Chief Complaint   Patient presents with    Diabetes Mellitus     Dr Raymond PCP 11/19/20    Nail Care    Nail Problem     left great toenail         Current shoe gear:   Tennis shoes         Hemoglobin A1C   Date Value Ref Range Status   08/13/2020 5.5 4.0 - 5.6 % Final     Comment:     ADA Screening Guidelines:  5.7-6.4%  Consistent with prediabetes  >or=6.5%  Consistent with diabetes  High levels of fetal hemoglobin interfere with the HbA1C  assay. Heterozygous hemoglobin variants (HbS, HgC, etc)do  not significantly interfere with this assay.   However, presence of multiple variants may affect accuracy.     01/17/2020 6.0 (H) 4.0 - 5.6 % Final     Comment:     ADA Screening Guidelines:  5.7-6.4%  Consistent with prediabetes  >or=6.5%  Consistent with diabetes  High levels of fetal hemoglobin interfere with the HbA1C  assay. Heterozygous hemoglobin variants (HbS, HgC, etc)do  not significantly interfere with this assay.   However, presence of multiple variants may affect  accuracy.     09/09/2019 6.1 (H) 4.0 - 5.6 % Final     Comment:     ADA Screening Guidelines:  5.7-6.4%  Consistent with prediabetes  >or=6.5%  Consistent with diabetes  High levels of fetal hemoglobin interfere with the HbA1C  assay. Heterozygous hemoglobin variants (HbS, HgC, etc)do  not significantly interfere with this assay.   However, presence of multiple variants may affect accuracy.       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 Tay    CATARACT EXTRACTION W/  INTRAOCULAR LENS IMPLANT Left 10/19/2017    Dr. Tay    ESOPHAGOGASTRODUODENOSCOPY N/A 8/17/2020    Procedure: EGD (ESOPHAGOGASTRODUODENOSCOPY);  Surgeon: Desmond Chapman MD;  Location: [facility];  Service: Endoscopy;  Laterality: N/A;    FEMORAL ARTERY STENT      KNEE SURGERY      bilateral scope       Family History   Problem Relation Age of Onset    Diabetes Mother     Heart disease Mother     Hypertension Mother     Cataracts Mother     No Known Problems Father     Hypertension Sister     No Known Problems Brother     No Known Problems Daughter     No Known Problems Maternal Aunt     No Known Problems Maternal Uncle     No Known Problems Paternal Aunt     No Known Problems Paternal Uncle     No Known Problems Maternal Grandmother     No Known Problems Maternal Grandfather     No Known Problems Paternal Grandmother     No Known Problems Paternal Grandfather     Amblyopia Neg Hx     Blindness Neg Hx     Cancer Neg Hx     Glaucoma Neg Hx     Macular degeneration Neg Hx     Retinal detachment Neg Hx     Strabismus Neg Hx     Stroke Neg Hx     Thyroid disease Neg Hx        Social History     Socioeconomic History    Marital status: Single     Spouse  name: Not on file    Number of children: Not on file    Years of education: Not on file    Highest education level: Not on file   Occupational History    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, etc   Social Needs    Financial resource strain: Not on file    Food insecurity     Worry: Not on file     Inability: Not on file    Transportation needs     Medical: Not on file     Non-medical: Not on file   Tobacco Use    Smoking status: Never Smoker    Smokeless tobacco: Never Used   Substance and Sexual Activity    Alcohol use: No    Drug use: No    Sexual activity: Not on file   Lifestyle    Physical activity     Days per week: Not on file     Minutes per session: Not on file    Stress: Not on file   Relationships    Social connections     Talks on phone: Not on file     Gets together: Not on file     Attends Mandaeism service: Not on file     Active member of club or organization: Not on file     Attends meetings of clubs or organizations: Not on file     Relationship status: Not on file   Other Topics Concern    Not on file   Social History Narrative    Not on file       Current Outpatient Medications   Medication Sig Dispense Refill    amLODIPine (NORVASC) 10 MG tablet Take 1 tablet (10 mg total) by mouth once daily. 90 tablet 3    apixaban (ELIQUIS) 5 mg Tab Take 1 tablet (5 mg total) by mouth 2 (two) times daily. 180 tablet 3    atorvastatin (LIPITOR) 80 MG tablet Take 1 tablet (80 mg total) by mouth once daily. 90 tablet 3    calcitRIOL (ROCALTROL) 0.25 MCG Cap Take 1 capsule (0.25 mcg total) by mouth once daily.      cholecalciferol, vitamin D3, (VITAMIN D3) 1,000 unit capsule Take 1 capsule (1,000 Units total) by mouth once daily. 90 capsule 3    clopidogreL (PLAVIX) 75 mg tablet Take 1 tablet (75 mg total) by mouth once daily. 90 tablet 3    ferrous gluconate 324 mg (37.5 mg iron) Tab tablet Take 1 tablet (324 mg total) by mouth 2 (two) times daily with meals. 60 tablet  "11    folic acid (FOLVITE) 1 MG tablet Take 1 tablet (1 mg total) by mouth once daily. 30 tablet 11    hydrALAZINE (APRESOLINE) 25 MG tablet Take 1 tablet (25 mg total) by mouth every 8 (eight) hours. 90 tablet 11    insulin aspart U-100 (NOVOLOG) 100 unit/mL injection Inject into the skin 3 (three) times daily before meals. Take 8 units in am      insulin detemir U-100 (LEVEMIR FLEXTOUCH U-100 INSULN) 100 unit/mL (3 mL) InPn pen Inject 15 Units into the skin every evening. STOP NOVOLOG 1 Box 11    labetaloL (NORMODYNE) 100 MG tablet Take 1 tablet (100 mg total) by mouth every 12 (twelve) hours. 60 tablet 11    pen needle, diabetic 31 gauge x 1/4" Ndle For mealtime insulin 300 each 11    phenytoin (DILANTIN) 100 MG ER capsule Take 1 capsule (100 mg total) by mouth 3 (three) times daily. 270 capsule 3    RENVELA 800 mg Tab       tamsulosin (FLOMAX) 0.4 mg Cap Take 2 capsules (0.8 mg total) by mouth once daily. 180 capsule 3    blood-glucose meter Misc 1 each by Misc.(Non-Drug; Combo Route) route once daily. Pharmacy-whichever brand specified by insurance 1 each 0    lancets (ONETOUCH DELICA LANCETS) 33 gauge Misc 1 lancet by Misc.(Non-Drug; Combo Route) route once daily. ONCE DAILY BLOOD SUGAR TESTING; WHICHEVER BRAND COVERED BY INSURANCE 30 each 11     No current facility-administered medications for this visit.        Review of patient's allergies indicates:   Allergen Reactions    Penicillins Hives       Review of Systems   Constitution: Negative for chills and fever.   Cardiovascular: Positive for leg swelling. Negative for chest pain and claudication.   Respiratory: Negative for cough and shortness of breath.    Skin: Positive for dry skin and suspicious lesions (corns/callus).   Musculoskeletal: Positive for muscle weakness.   Gastrointestinal: Negative for nausea and vomiting.   Neurological: Positive for focal weakness (right sided), numbness and sensory change. Negative for paresthesias. "   Psychiatric/Behavioral: Negative for altered mental status.           Objective:      Physical Exam  Vitals signs reviewed.   Constitutional:       Appearance: He is well-developed.   HENT:      Head: Normocephalic.   Cardiovascular:      Pulses:           Dorsalis pedis pulses are 2+ on the right side and 2+ on the left side.        Posterior tibial pulses are 1+ on the right side and 1+ on the left side.      Comments: CRT < 3 sec to tips of toes. 2+ pitting edema noted to b/l LE. No vericosities noted to b/l LEs.     Pulmonary:      Effort: No respiratory distress.   Musculoskeletal:      Comments: Gastrocnemius equinus noted to b/l ankles with decreased DF noted on exam. MMT 5/5 in DF/PF/Inv/Ev resistance with no reproduction of pain in any direction Right, 3/5 left. Passive range of motion of ankle and pedal joints is painless. Adequate pedal joint ROM.     Rigid hammertoe contractures noted to toes 2-4 R-asymptomatic.     Limited hallux MTPJ ROM with plantarflexion contracture of b/l hallux IPJ, rigid R semi-reducible L.    Skin:     General: Skin is warm and dry.      Findings: Lesion present. No ecchymosis or erythema.      Comments: No open lesions, lacerations or wounds noted. Nails are dystrophic, elongated, thickened with yellow/brown discoloration to R 1 and L 1-5. Remaining toenails normotrophic.  Interdigital spaces clean, dry and intact b/l. No erythema noted to b/l foot. Skin texture thin, shiny, atrophic. Pedal hair absent. Toes cool to touch b/l.     Pre ulcerative callus left heel.    Neurological:      Mental Status: He is alert and oriented to person, place, and time.      Sensory: Sensory deficit present.      Comments: Light touch, proprioception, and sharp/dull sensation are all intact bilaterally. Protective threshold with the [place]-Wienstein monofilament is intact bilaterally. Vibratory sensation diminished b/l distal foot.      Psychiatric:         Behavior: Behavior normal.          Thought Content: Thought content normal.         Judgment: Judgment normal.               Assessment:       Encounter Diagnoses   Name Primary?    Type II diabetes mellitus with neurological manifestations Yes    History of stroke          Plan:       Miguel was seen today for diabetes mellitus, nail care and nail problem.    Diagnoses and all orders for this visit:    Type II diabetes mellitus with neurological manifestations  -     BOOT LINER FOR HOME USE    History of stroke  -     BOOT LINER FOR HOME USE      I counseled the patient on his conditions, their implications and medical management.    - Shoe inspection. Diabetic Foot Education. Patient reminded of the importance of good nutrition and blood sugar control to help prevent podiatric complications of diabetes. Patient instructed on proper foot hygeine. We discussed wearing proper shoe gear, daily foot inspections, never walking without protective shoe gear, never putting sharp instruments to feet, routine podiatric nail visits every 2-3 months.      Discussed regular and routine moisturizer to skin of both feet to help improve dry skin. Advised to apply twice daily until resolution of symptoms. Avoid between toes. Recommended Gold Bond Foot cream or Diabetic cream.      - With patient's permission, nails were aggressively reduced and debrided x 10 to their soft tissue attachment mechanically and with electric , removing all offending nail and debris. Patient relates relief following the procedure. He will continue to monitor the areas daily, inspect his feet, wear protective shoe gear when ambulatory, moisturizer to maintain skin integrity and follow in this office in approximately 2-3 months, sooner p.r.n.     - After cleansing the area w/ alcohol prep pad the above mentioned hyperkeratosis was trimmed utilizing No 15 scapel, to a smooth base with out incident. Left heel.       Pre ulcerative callus left heel, Rx. Heel protector boot given to  patient.     Sabi Douglas DPM

## 2021-01-04 ENCOUNTER — HOSPITAL ENCOUNTER (INPATIENT)
Facility: HOSPITAL | Age: 66
LOS: 8 days | Discharge: HOME HEALTH | DRG: 637 | End: 2021-01-12
Attending: EMERGENCY MEDICINE | Admitting: EMERGENCY MEDICINE
Payer: MEDICARE

## 2021-01-04 DIAGNOSIS — R31.9 HEMATURIA, UNSPECIFIED TYPE: ICD-10-CM

## 2021-01-04 DIAGNOSIS — I69.398 SEIZURE DISORDER AS SEQUELA OF CEREBROVASCULAR ACCIDENT: Chronic | ICD-10-CM

## 2021-01-04 DIAGNOSIS — N40.0 BENIGN PROSTATIC HYPERPLASIA, UNSPECIFIED WHETHER LOWER URINARY TRACT SYMPTOMS PRESENT: ICD-10-CM

## 2021-01-04 DIAGNOSIS — D70.9 FEVER AND NEUTROPENIA: ICD-10-CM

## 2021-01-04 DIAGNOSIS — G40.909 SEIZURE DISORDER AS SEQUELA OF CEREBROVASCULAR ACCIDENT: Chronic | ICD-10-CM

## 2021-01-04 DIAGNOSIS — N18.6 ESRD (END STAGE RENAL DISEASE): Primary | ICD-10-CM

## 2021-01-04 DIAGNOSIS — E87.5 HYPERKALEMIA: ICD-10-CM

## 2021-01-04 DIAGNOSIS — R50.9 FEVER, UNSPECIFIED FEVER CAUSE: ICD-10-CM

## 2021-01-04 DIAGNOSIS — D61.818 PANCYTOPENIA: ICD-10-CM

## 2021-01-04 DIAGNOSIS — G93.41 ENCEPHALOPATHY, METABOLIC: ICD-10-CM

## 2021-01-04 DIAGNOSIS — A41.9 SEPSIS: ICD-10-CM

## 2021-01-04 DIAGNOSIS — Z86.718 HISTORY OF DEEP VEIN THROMBOSIS (DVT) OF LOWER EXTREMITY: ICD-10-CM

## 2021-01-04 DIAGNOSIS — R50.81 FEVER AND NEUTROPENIA: ICD-10-CM

## 2021-01-04 DIAGNOSIS — R50.9 FUO (FEVER OF UNKNOWN ORIGIN): ICD-10-CM

## 2021-01-04 DIAGNOSIS — R07.9 CHEST PAIN: ICD-10-CM

## 2021-01-04 DIAGNOSIS — R00.0 TACHYCARDIA: ICD-10-CM

## 2021-01-04 PROBLEM — R74.8 ELEVATED LIVER ENZYMES: Status: ACTIVE | Noted: 2021-01-04

## 2021-01-04 PROBLEM — E16.2 HYPOGLYCEMIA: Status: ACTIVE | Noted: 2021-01-04

## 2021-01-04 LAB
ALBUMIN SERPL BCP-MCNC: 2.3 G/DL (ref 3.5–5.2)
ALP SERPL-CCNC: 234 U/L (ref 55–135)
ALT SERPL W/O P-5'-P-CCNC: 96 U/L (ref 10–44)
ANION GAP SERPL CALC-SCNC: 15 MMOL/L (ref 8–16)
ANISOCYTOSIS BLD QL SMEAR: SLIGHT
AST SERPL-CCNC: 190 U/L (ref 10–40)
BASOPHILS # BLD AUTO: 0.01 K/UL (ref 0–0.2)
BASOPHILS # BLD AUTO: 0.01 K/UL (ref 0–0.2)
BASOPHILS NFR BLD: 0.2 % (ref 0–1.9)
BASOPHILS NFR BLD: 0.4 % (ref 0–1.9)
BILIRUB SERPL-MCNC: 0.7 MG/DL (ref 0.1–1)
BUN SERPL-MCNC: 79 MG/DL (ref 8–23)
BURR CELLS BLD QL SMEAR: ABNORMAL
CALCIUM SERPL-MCNC: 6.4 MG/DL (ref 8.7–10.5)
CHLORIDE SERPL-SCNC: 89 MMOL/L (ref 95–110)
CO2 SERPL-SCNC: 21 MMOL/L (ref 23–29)
CREAT SERPL-MCNC: 12.1 MG/DL (ref 0.5–1.4)
CTP QC/QA: YES
CTP QC/QA: YES
DIFFERENTIAL METHOD: ABNORMAL
DIFFERENTIAL METHOD: ABNORMAL
EOSINOPHIL # BLD AUTO: 0 K/UL (ref 0–0.5)
EOSINOPHIL # BLD AUTO: 0 K/UL (ref 0–0.5)
EOSINOPHIL NFR BLD: 0 % (ref 0–8)
EOSINOPHIL NFR BLD: 0 % (ref 0–8)
ERYTHROCYTE [DISTWIDTH] IN BLOOD BY AUTOMATED COUNT: 15.4 % (ref 11.5–14.5)
ERYTHROCYTE [DISTWIDTH] IN BLOOD BY AUTOMATED COUNT: 15.5 % (ref 11.5–14.5)
EST. GFR  (AFRICAN AMERICAN): 4.4 ML/MIN/1.73 M^2
EST. GFR  (NON AFRICAN AMERICAN): 3.8 ML/MIN/1.73 M^2
FIBRINOGEN PPP-MCNC: 215 MG/DL (ref 182–366)
GLUCOSE SERPL-MCNC: 105 MG/DL (ref 70–110)
HCT VFR BLD AUTO: 33.3 % (ref 40–54)
HCT VFR BLD AUTO: 34 % (ref 40–54)
HGB BLD-MCNC: 11.9 G/DL (ref 14–18)
HGB BLD-MCNC: 12 G/DL (ref 14–18)
IMM GRANULOCYTES # BLD AUTO: 0.02 K/UL (ref 0–0.04)
IMM GRANULOCYTES # BLD AUTO: 0.03 K/UL (ref 0–0.04)
IMM GRANULOCYTES NFR BLD AUTO: 0.6 % (ref 0–0.5)
IMM GRANULOCYTES NFR BLD AUTO: 0.9 % (ref 0–0.5)
INR PPP: 1.1 (ref 0.8–1.2)
LACTATE SERPL-SCNC: 0.7 MMOL/L (ref 0.5–2.2)
LACTATE SERPL-SCNC: 1.8 MMOL/L (ref 0.5–2.2)
LYMPHOCYTES # BLD AUTO: 0.4 K/UL (ref 1–4.8)
LYMPHOCYTES # BLD AUTO: 0.4 K/UL (ref 1–4.8)
LYMPHOCYTES NFR BLD: 17.1 % (ref 18–48)
LYMPHOCYTES NFR BLD: 7.6 % (ref 18–48)
MAGNESIUM SERPL-MCNC: 2.2 MG/DL (ref 1.6–2.6)
MCH RBC QN AUTO: 24 PG (ref 27–31)
MCH RBC QN AUTO: 24.1 PG (ref 27–31)
MCHC RBC AUTO-ENTMCNC: 35.3 G/DL (ref 32–36)
MCHC RBC AUTO-ENTMCNC: 35.7 G/DL (ref 32–36)
MCV RBC AUTO: 68 FL (ref 82–98)
MCV RBC AUTO: 68 FL (ref 82–98)
MONOCYTES # BLD AUTO: 0.2 K/UL (ref 0.3–1)
MONOCYTES # BLD AUTO: 0.3 K/UL (ref 0.3–1)
MONOCYTES NFR BLD: 10.3 % (ref 4–15)
MONOCYTES NFR BLD: 6.6 % (ref 4–15)
NEUTROPHILS # BLD AUTO: 1.7 K/UL (ref 1.8–7.7)
NEUTROPHILS # BLD AUTO: 4.1 K/UL (ref 1.8–7.7)
NEUTROPHILS NFR BLD: 71.3 % (ref 38–73)
NEUTROPHILS NFR BLD: 85 % (ref 38–73)
NRBC BLD-RTO: 0 /100 WBC
NRBC BLD-RTO: 0 /100 WBC
OSMOLALITY SERPL: 285 MOSM/KG (ref 280–300)
OVALOCYTES BLD QL SMEAR: ABNORMAL
PATH REV BLD -IMP: NORMAL
PHENYTOIN SERPL-MCNC: 0.9 UG/ML (ref 10–20)
PHOSPHATE SERPL-MCNC: 6.6 MG/DL (ref 2.7–4.5)
PLATELET # BLD AUTO: 37 K/UL (ref 150–350)
PLATELET # BLD AUTO: 40 K/UL (ref 150–350)
PLATELET BLD QL SMEAR: ABNORMAL
PMV BLD AUTO: ABNORMAL FL (ref 9.2–12.9)
PMV BLD AUTO: ABNORMAL FL (ref 9.2–12.9)
POC MOLECULAR INFLUENZA A AGN: NEGATIVE
POC MOLECULAR INFLUENZA B AGN: NEGATIVE
POCT GLUCOSE: 107 MG/DL (ref 70–110)
POCT GLUCOSE: 117 MG/DL (ref 70–110)
POCT GLUCOSE: 127 MG/DL (ref 70–110)
POCT GLUCOSE: 138 MG/DL (ref 70–110)
POCT GLUCOSE: 160 MG/DL (ref 70–110)
POCT GLUCOSE: 68 MG/DL (ref 70–110)
POCT GLUCOSE: 80 MG/DL (ref 70–110)
POCT GLUCOSE: 89 MG/DL (ref 70–110)
POIKILOCYTOSIS BLD QL SMEAR: SLIGHT
POTASSIUM SERPL-SCNC: 6.6 MMOL/L (ref 3.5–5.1)
PROT SERPL-MCNC: 5.7 G/DL (ref 6–8.4)
PROTHROMBIN TIME: 11.9 SEC (ref 9–12.5)
RBC # BLD AUTO: 4.93 M/UL (ref 4.6–6.2)
RBC # BLD AUTO: 5 M/UL (ref 4.6–6.2)
SARS-COV-2 RDRP RESP QL NAA+PROBE: NEGATIVE
SODIUM SERPL-SCNC: 125 MMOL/L (ref 136–145)
TROPONIN I SERPL DL<=0.01 NG/ML-MCNC: 0.09 NG/ML (ref 0–0.03)
WBC # BLD AUTO: 2.34 K/UL (ref 3.9–12.7)
WBC # BLD AUTO: 4.85 K/UL (ref 3.9–12.7)

## 2021-01-04 PROCEDURE — 86703 HIV-1/HIV-2 1 RESULT ANTBDY: CPT

## 2021-01-04 PROCEDURE — 99291 CRITICAL CARE FIRST HOUR: CPT | Mod: ,,, | Performed by: EMERGENCY MEDICINE

## 2021-01-04 PROCEDURE — 96366 THER/PROPH/DIAG IV INF ADDON: CPT

## 2021-01-04 PROCEDURE — 36415 COLL VENOUS BLD VENIPUNCTURE: CPT

## 2021-01-04 PROCEDURE — 83930 ASSAY OF BLOOD OSMOLALITY: CPT

## 2021-01-04 PROCEDURE — 84484 ASSAY OF TROPONIN QUANT: CPT

## 2021-01-04 PROCEDURE — 80185 ASSAY OF PHENYTOIN TOTAL: CPT

## 2021-01-04 PROCEDURE — 90935 PR HEMODIALYSIS, ONE EVALUATION: ICD-10-PCS | Mod: GC,,, | Performed by: INTERNAL MEDICINE

## 2021-01-04 PROCEDURE — 93005 ELECTROCARDIOGRAM TRACING: CPT

## 2021-01-04 PROCEDURE — 25000003 PHARM REV CODE 250: Performed by: PHYSICIAN ASSISTANT

## 2021-01-04 PROCEDURE — 99223 PR INITIAL HOSPITAL CARE,LEVL III: ICD-10-PCS | Mod: AI,GC,, | Performed by: STUDENT IN AN ORGANIZED HEALTH CARE EDUCATION/TRAINING PROGRAM

## 2021-01-04 PROCEDURE — 25000003 PHARM REV CODE 250: Performed by: STUDENT IN AN ORGANIZED HEALTH CARE EDUCATION/TRAINING PROGRAM

## 2021-01-04 PROCEDURE — 96365 THER/PROPH/DIAG IV INF INIT: CPT

## 2021-01-04 PROCEDURE — 82962 GLUCOSE BLOOD TEST: CPT

## 2021-01-04 PROCEDURE — 83605 ASSAY OF LACTIC ACID: CPT | Mod: 91

## 2021-01-04 PROCEDURE — 80053 COMPREHEN METABOLIC PANEL: CPT

## 2021-01-04 PROCEDURE — 63600175 PHARM REV CODE 636 W HCPCS: Performed by: STUDENT IN AN ORGANIZED HEALTH CARE EDUCATION/TRAINING PROGRAM

## 2021-01-04 PROCEDURE — U0002 COVID-19 LAB TEST NON-CDC: HCPCS | Performed by: PHYSICIAN ASSISTANT

## 2021-01-04 PROCEDURE — 83735 ASSAY OF MAGNESIUM: CPT

## 2021-01-04 PROCEDURE — 94761 N-INVAS EAR/PLS OXIMETRY MLT: CPT

## 2021-01-04 PROCEDURE — 84100 ASSAY OF PHOSPHORUS: CPT

## 2021-01-04 PROCEDURE — 99291 CRITICAL CARE FIRST HOUR: CPT | Mod: 25

## 2021-01-04 PROCEDURE — 94640 AIRWAY INHALATION TREATMENT: CPT

## 2021-01-04 PROCEDURE — 99291 PR CRITICAL CARE, E/M 30-74 MINUTES: ICD-10-PCS | Mod: ,,, | Performed by: EMERGENCY MEDICINE

## 2021-01-04 PROCEDURE — 93010 ELECTROCARDIOGRAM REPORT: CPT | Mod: ,,, | Performed by: INTERNAL MEDICINE

## 2021-01-04 PROCEDURE — 25000242 PHARM REV CODE 250 ALT 637 W/ HCPCS: Performed by: PHYSICIAN ASSISTANT

## 2021-01-04 PROCEDURE — 85060 BLOOD SMEAR INTERPRETATION: CPT | Mod: ,,, | Performed by: PATHOLOGY

## 2021-01-04 PROCEDURE — 85610 PROTHROMBIN TIME: CPT

## 2021-01-04 PROCEDURE — 80074 ACUTE HEPATITIS PANEL: CPT

## 2021-01-04 PROCEDURE — 87040 BLOOD CULTURE FOR BACTERIA: CPT

## 2021-01-04 PROCEDURE — 90935 HEMODIALYSIS ONE EVALUATION: CPT | Mod: GC,,, | Performed by: INTERNAL MEDICINE

## 2021-01-04 PROCEDURE — 85060 PATHOLOGIST REVIEW: ICD-10-PCS | Mod: ,,, | Performed by: PATHOLOGY

## 2021-01-04 PROCEDURE — 80100016 HC MAINTENANCE HEMODIALYSIS

## 2021-01-04 PROCEDURE — 85025 COMPLETE CBC W/AUTO DIFF WBC: CPT | Mod: 91

## 2021-01-04 PROCEDURE — 20600001 HC STEP DOWN PRIVATE ROOM

## 2021-01-04 PROCEDURE — 63600175 PHARM REV CODE 636 W HCPCS: Performed by: PHYSICIAN ASSISTANT

## 2021-01-04 PROCEDURE — 96361 HYDRATE IV INFUSION ADD-ON: CPT

## 2021-01-04 PROCEDURE — 96375 TX/PRO/DX INJ NEW DRUG ADDON: CPT

## 2021-01-04 PROCEDURE — 99223 1ST HOSP IP/OBS HIGH 75: CPT | Mod: AI,GC,, | Performed by: STUDENT IN AN ORGANIZED HEALTH CARE EDUCATION/TRAINING PROGRAM

## 2021-01-04 PROCEDURE — 80047 BASIC METABLC PNL IONIZED CA: CPT

## 2021-01-04 PROCEDURE — 87502 INFLUENZA DNA AMP PROBE: CPT

## 2021-01-04 PROCEDURE — 93010 EKG 12-LEAD: ICD-10-PCS | Mod: ,,, | Performed by: INTERNAL MEDICINE

## 2021-01-04 PROCEDURE — 85384 FIBRINOGEN ACTIVITY: CPT

## 2021-01-04 RX ORDER — LABETALOL 100 MG/1
100 TABLET, FILM COATED ORAL EVERY 12 HOURS
Status: DISCONTINUED | OUTPATIENT
Start: 2021-01-04 | End: 2021-01-12 | Stop reason: HOSPADM

## 2021-01-04 RX ORDER — IBUPROFEN 200 MG
24 TABLET ORAL
Status: DISCONTINUED | OUTPATIENT
Start: 2021-01-04 | End: 2021-01-12 | Stop reason: HOSPADM

## 2021-01-04 RX ORDER — ATORVASTATIN CALCIUM 20 MG/1
80 TABLET, FILM COATED ORAL DAILY
Status: DISCONTINUED | OUTPATIENT
Start: 2021-01-05 | End: 2021-01-12 | Stop reason: HOSPADM

## 2021-01-04 RX ORDER — HEPARIN SODIUM 1000 [USP'U]/ML
1000 INJECTION, SOLUTION INTRAVENOUS; SUBCUTANEOUS
Status: DISCONTINUED | OUTPATIENT
Start: 2021-01-04 | End: 2021-01-04

## 2021-01-04 RX ORDER — AMLODIPINE BESYLATE 10 MG/1
10 TABLET ORAL DAILY
Status: DISCONTINUED | OUTPATIENT
Start: 2021-01-05 | End: 2021-01-12 | Stop reason: HOSPADM

## 2021-01-04 RX ORDER — SODIUM CHLORIDE 0.9 % (FLUSH) 0.9 %
10 SYRINGE (ML) INJECTION
Status: DISCONTINUED | OUTPATIENT
Start: 2021-01-04 | End: 2021-01-12 | Stop reason: HOSPADM

## 2021-01-04 RX ORDER — CEFEPIME HYDROCHLORIDE 2 G/1
2 INJECTION, POWDER, FOR SOLUTION INTRAVENOUS
Status: COMPLETED | OUTPATIENT
Start: 2021-01-04 | End: 2021-01-04

## 2021-01-04 RX ORDER — ALBUTEROL SULFATE 2.5 MG/.5ML
10 SOLUTION RESPIRATORY (INHALATION)
Status: COMPLETED | OUTPATIENT
Start: 2021-01-04 | End: 2021-01-04

## 2021-01-04 RX ORDER — ACETAMINOPHEN 325 MG/1
650 TABLET ORAL EVERY 6 HOURS PRN
Status: COMPLETED | OUTPATIENT
Start: 2021-01-04 | End: 2021-01-04

## 2021-01-04 RX ORDER — HYDRALAZINE HYDROCHLORIDE 25 MG/1
25 TABLET, FILM COATED ORAL EVERY 8 HOURS
Status: DISCONTINUED | OUTPATIENT
Start: 2021-01-04 | End: 2021-01-12 | Stop reason: HOSPADM

## 2021-01-04 RX ORDER — MUPIROCIN 20 MG/G
OINTMENT TOPICAL 2 TIMES DAILY
Status: DISPENSED | OUTPATIENT
Start: 2021-01-04 | End: 2021-01-09

## 2021-01-04 RX ORDER — CEFEPIME HYDROCHLORIDE 1 G/1
1 INJECTION, POWDER, FOR SOLUTION INTRAMUSCULAR; INTRAVENOUS
Status: DISCONTINUED | OUTPATIENT
Start: 2021-01-05 | End: 2021-01-09

## 2021-01-04 RX ORDER — CALCITRIOL 0.25 UG/1
0.25 CAPSULE ORAL DAILY
Status: DISCONTINUED | OUTPATIENT
Start: 2021-01-05 | End: 2021-01-12 | Stop reason: HOSPADM

## 2021-01-04 RX ORDER — CLOPIDOGREL BISULFATE 75 MG/1
75 TABLET ORAL DAILY
Status: DISCONTINUED | OUTPATIENT
Start: 2021-01-05 | End: 2021-01-04

## 2021-01-04 RX ORDER — SEVELAMER CARBONATE 800 MG/1
800 TABLET, FILM COATED ORAL
Status: DISCONTINUED | OUTPATIENT
Start: 2021-01-04 | End: 2021-01-12 | Stop reason: HOSPADM

## 2021-01-04 RX ORDER — SODIUM CHLORIDE 9 MG/ML
INJECTION, SOLUTION INTRAVENOUS ONCE
Status: COMPLETED | OUTPATIENT
Start: 2021-01-04 | End: 2021-01-04

## 2021-01-04 RX ORDER — IBUPROFEN 200 MG
16 TABLET ORAL
Status: DISCONTINUED | OUTPATIENT
Start: 2021-01-04 | End: 2021-01-12 | Stop reason: HOSPADM

## 2021-01-04 RX ORDER — PHENYTOIN SODIUM 100 MG/1
100 CAPSULE, EXTENDED RELEASE ORAL 3 TIMES DAILY
Status: DISCONTINUED | OUTPATIENT
Start: 2021-01-04 | End: 2021-01-08

## 2021-01-04 RX ORDER — GLUCAGON 1 MG
1 KIT INJECTION
Status: DISCONTINUED | OUTPATIENT
Start: 2021-01-04 | End: 2021-01-12 | Stop reason: HOSPADM

## 2021-01-04 RX ORDER — TAMSULOSIN HYDROCHLORIDE 0.4 MG/1
2 CAPSULE ORAL DAILY
Status: DISCONTINUED | OUTPATIENT
Start: 2021-01-05 | End: 2021-01-12 | Stop reason: HOSPADM

## 2021-01-04 RX ORDER — DEXTROSE MONOHYDRATE 50 MG/ML
INJECTION, SOLUTION INTRAVENOUS CONTINUOUS
Status: DISCONTINUED | OUTPATIENT
Start: 2021-01-04 | End: 2021-01-04

## 2021-01-04 RX ADMIN — HYDRALAZINE HYDROCHLORIDE 25 MG: 25 TABLET, FILM COATED ORAL at 11:01

## 2021-01-04 RX ADMIN — SODIUM CHLORIDE 100 ML/HR: 0.9 INJECTION, SOLUTION INTRAVENOUS at 05:01

## 2021-01-04 RX ADMIN — SODIUM CHLORIDE 2586 ML: 0.9 INJECTION, SOLUTION INTRAVENOUS at 12:01

## 2021-01-04 RX ADMIN — ACETAMINOPHEN 650 MG: 325 TABLET ORAL at 11:01

## 2021-01-04 RX ADMIN — PHENYTOIN SODIUM 100 MG: 100 CAPSULE ORAL at 11:01

## 2021-01-04 RX ADMIN — HEPARIN SODIUM 1000 UNITS: 1000 INJECTION, SOLUTION INTRAVENOUS; SUBCUTANEOUS at 06:01

## 2021-01-04 RX ADMIN — VANCOMYCIN HYDROCHLORIDE 1250 MG: 1.25 INJECTION, POWDER, LYOPHILIZED, FOR SOLUTION INTRAVENOUS at 01:01

## 2021-01-04 RX ADMIN — LABETALOL HYDROCHLORIDE 100 MG: 100 TABLET, FILM COATED ORAL at 11:01

## 2021-01-04 RX ADMIN — Medication 16 G: at 05:01

## 2021-01-04 RX ADMIN — CEFEPIME 2 G: 2 INJECTION, POWDER, FOR SOLUTION INTRAVENOUS at 12:01

## 2021-01-04 RX ADMIN — Medication 16 G: at 04:01

## 2021-01-04 RX ADMIN — DEXTROSE MONOHYDRATE 25 G: 25 INJECTION, SOLUTION INTRAVENOUS at 01:01

## 2021-01-04 RX ADMIN — ALBUTEROL SULFATE 10 MG: 2.5 SOLUTION RESPIRATORY (INHALATION) at 01:01

## 2021-01-04 RX ADMIN — MUPIROCIN: 20 OINTMENT TOPICAL at 11:01

## 2021-01-04 RX ADMIN — CALCIUM GLUCONATE 1 G: 98 INJECTION, SOLUTION INTRAVENOUS at 01:01

## 2021-01-04 RX ADMIN — HYDRALAZINE HYDROCHLORIDE 25 MG: 25 TABLET, FILM COATED ORAL at 05:01

## 2021-01-04 RX ADMIN — INSULIN HUMAN 10 UNITS: 100 INJECTION, SOLUTION PARENTERAL at 01:01

## 2021-01-04 RX ADMIN — LABETALOL HYDROCHLORIDE 100 MG: 100 TABLET, FILM COATED ORAL at 06:01

## 2021-01-05 PROBLEM — R53.1 WEAKNESS: Status: ACTIVE | Noted: 2021-01-05

## 2021-01-05 LAB
ALBUMIN SERPL BCP-MCNC: 1.9 G/DL (ref 3.5–5.2)
ALP SERPL-CCNC: 173 U/L (ref 55–135)
ALT SERPL W/O P-5'-P-CCNC: 80 U/L (ref 10–44)
ANION GAP SERPL CALC-SCNC: 13 MMOL/L (ref 8–16)
ANISOCYTOSIS BLD QL SMEAR: SLIGHT
AST SERPL-CCNC: 164 U/L (ref 10–40)
BACTERIA #/AREA URNS AUTO: ABNORMAL /HPF
BASOPHILS # BLD AUTO: 0.01 K/UL (ref 0–0.2)
BASOPHILS NFR BLD: 0.3 % (ref 0–1.9)
BILIRUB SERPL-MCNC: 0.7 MG/DL (ref 0.1–1)
BILIRUB UR QL STRIP: NEGATIVE
BUN SERPL-MCNC: 46 MG/DL (ref 8–23)
BUN SERPL-MCNC: 65 MG/DL (ref 6–30)
BURR CELLS BLD QL SMEAR: ABNORMAL
CALCIUM SERPL-MCNC: 6.7 MG/DL (ref 8.7–10.5)
CHLORIDE SERPL-SCNC: 92 MMOL/L (ref 95–110)
CHLORIDE SERPL-SCNC: 95 MMOL/L (ref 95–110)
CLARITY UR REFRACT.AUTO: ABNORMAL
CO2 SERPL-SCNC: 22 MMOL/L (ref 23–29)
COLOR UR AUTO: ABNORMAL
CREAT SERPL-MCNC: 13.2 MG/DL (ref 0.5–1.4)
CREAT SERPL-MCNC: 8.1 MG/DL (ref 0.5–1.4)
DACRYOCYTES BLD QL SMEAR: ABNORMAL
DIFFERENTIAL METHOD: ABNORMAL
EOSINOPHIL # BLD AUTO: 0 K/UL (ref 0–0.5)
EOSINOPHIL NFR BLD: 0 % (ref 0–8)
ERYTHROCYTE [DISTWIDTH] IN BLOOD BY AUTOMATED COUNT: 15.4 % (ref 11.5–14.5)
EST. GFR  (AFRICAN AMERICAN): 7.2 ML/MIN/1.73 M^2
EST. GFR  (NON AFRICAN AMERICAN): 6.2 ML/MIN/1.73 M^2
GLUCOSE SERPL-MCNC: 152 MG/DL (ref 70–110)
GLUCOSE SERPL-MCNC: 98 MG/DL (ref 70–110)
GLUCOSE UR QL STRIP: ABNORMAL
HAV IGM SERPL QL IA: NEGATIVE
HBV CORE IGM SERPL QL IA: NEGATIVE
HBV SURFACE AG SERPL QL IA: NEGATIVE
HCT VFR BLD AUTO: 29.1 % (ref 40–54)
HCT VFR BLD CALC: 37 %PCV (ref 36–54)
HCV AB SERPL QL IA: NEGATIVE
HGB BLD-MCNC: 9.9 G/DL (ref 14–18)
HGB UR QL STRIP: ABNORMAL
HIV 1+2 AB+HIV1 P24 AG SERPL QL IA: NEGATIVE
HYALINE CASTS UR QL AUTO: 0 /LPF
HYPOCHROMIA BLD QL SMEAR: ABNORMAL
IMM GRANULOCYTES # BLD AUTO: 0.01 K/UL (ref 0–0.04)
IMM GRANULOCYTES NFR BLD AUTO: 0.3 % (ref 0–0.5)
KETONES UR QL STRIP: NEGATIVE
LEUKOCYTE ESTERASE UR QL STRIP: ABNORMAL
LYMPHOCYTES # BLD AUTO: 0.5 K/UL (ref 1–4.8)
LYMPHOCYTES NFR BLD: 13.1 % (ref 18–48)
MCH RBC QN AUTO: 23.6 PG (ref 27–31)
MCHC RBC AUTO-ENTMCNC: 34 G/DL (ref 32–36)
MCV RBC AUTO: 69 FL (ref 82–98)
MICROSCOPIC COMMENT: ABNORMAL
MONOCYTES # BLD AUTO: 0.3 K/UL (ref 0.3–1)
MONOCYTES NFR BLD: 7.4 % (ref 4–15)
NEUTROPHILS # BLD AUTO: 2.9 K/UL (ref 1.8–7.7)
NEUTROPHILS NFR BLD: 78.9 % (ref 38–73)
NITRITE UR QL STRIP: NEGATIVE
NRBC BLD-RTO: 0 /100 WBC
OSMOLALITY UR: 274 MOSM/KG (ref 50–1200)
OVALOCYTES BLD QL SMEAR: ABNORMAL
PATH REV BLD -IMP: NORMAL
PH UR STRIP: 7 [PH] (ref 5–8)
PLATELET # BLD AUTO: 36 K/UL (ref 150–350)
PLATELET BLD QL SMEAR: ABNORMAL
PMV BLD AUTO: ABNORMAL FL (ref 9.2–12.9)
POC IONIZED CALCIUM: 0.83 MMOL/L (ref 1.06–1.42)
POC TCO2 (MEASURED): 24 MMOL/L (ref 23–29)
POCT GLUCOSE: 106 MG/DL (ref 70–110)
POCT GLUCOSE: 115 MG/DL (ref 70–110)
POCT GLUCOSE: 143 MG/DL (ref 70–110)
POCT GLUCOSE: 160 MG/DL (ref 70–110)
POCT GLUCOSE: 92 MG/DL (ref 70–110)
POCT GLUCOSE: 99 MG/DL (ref 70–110)
POIKILOCYTOSIS BLD QL SMEAR: SLIGHT
POLYCHROMASIA BLD QL SMEAR: ABNORMAL
POTASSIUM BLD-SCNC: 5.3 MMOL/L (ref 3.5–5.1)
POTASSIUM SERPL-SCNC: 5 MMOL/L (ref 3.5–5.1)
PROT SERPL-MCNC: 4.7 G/DL (ref 6–8.4)
PROT UR QL STRIP: ABNORMAL
RBC # BLD AUTO: 4.2 M/UL (ref 4.6–6.2)
RBC #/AREA URNS AUTO: 5 /HPF (ref 0–4)
SAMPLE: ABNORMAL
SODIUM BLD-SCNC: 127 MMOL/L (ref 136–145)
SODIUM SERPL-SCNC: 130 MMOL/L (ref 136–145)
SODIUM UR-SCNC: 58 MMOL/L (ref 20–250)
SP GR UR STRIP: 1.01 (ref 1–1.03)
TARGETS BLD QL SMEAR: ABNORMAL
URN SPEC COLLECT METH UR: ABNORMAL
VANCOMYCIN SERPL-MCNC: 8.3 UG/ML
WBC # BLD AUTO: 3.66 K/UL (ref 3.9–12.7)
WBC #/AREA URNS AUTO: 9 /HPF (ref 0–5)

## 2021-01-05 PROCEDURE — 84300 ASSAY OF URINE SODIUM: CPT

## 2021-01-05 PROCEDURE — 81001 URINALYSIS AUTO W/SCOPE: CPT

## 2021-01-05 PROCEDURE — 80202 ASSAY OF VANCOMYCIN: CPT

## 2021-01-05 PROCEDURE — 99233 SBSQ HOSP IP/OBS HIGH 50: CPT | Mod: GC,,, | Performed by: STUDENT IN AN ORGANIZED HEALTH CARE EDUCATION/TRAINING PROGRAM

## 2021-01-05 PROCEDURE — 87040 BLOOD CULTURE FOR BACTERIA: CPT | Mod: 59

## 2021-01-05 PROCEDURE — 63600175 PHARM REV CODE 636 W HCPCS: Performed by: STUDENT IN AN ORGANIZED HEALTH CARE EDUCATION/TRAINING PROGRAM

## 2021-01-05 PROCEDURE — 97165 OT EVAL LOW COMPLEX 30 MIN: CPT

## 2021-01-05 PROCEDURE — 99233 PR SUBSEQUENT HOSPITAL CARE,LEVL III: ICD-10-PCS | Mod: GC,,, | Performed by: STUDENT IN AN ORGANIZED HEALTH CARE EDUCATION/TRAINING PROGRAM

## 2021-01-05 PROCEDURE — 97110 THERAPEUTIC EXERCISES: CPT

## 2021-01-05 PROCEDURE — 83935 ASSAY OF URINE OSMOLALITY: CPT

## 2021-01-05 PROCEDURE — 97535 SELF CARE MNGMENT TRAINING: CPT

## 2021-01-05 PROCEDURE — 80053 COMPREHEN METABOLIC PANEL: CPT

## 2021-01-05 PROCEDURE — 20600001 HC STEP DOWN PRIVATE ROOM

## 2021-01-05 PROCEDURE — 25000003 PHARM REV CODE 250: Performed by: STUDENT IN AN ORGANIZED HEALTH CARE EDUCATION/TRAINING PROGRAM

## 2021-01-05 PROCEDURE — 36415 COLL VENOUS BLD VENIPUNCTURE: CPT

## 2021-01-05 PROCEDURE — 85025 COMPLETE CBC W/AUTO DIFF WBC: CPT

## 2021-01-05 RX ORDER — AMOXICILLIN 250 MG
1 CAPSULE ORAL DAILY PRN
Status: DISCONTINUED | OUTPATIENT
Start: 2021-01-05 | End: 2021-01-12 | Stop reason: HOSPADM

## 2021-01-05 RX ORDER — SODIUM CHLORIDE 9 MG/ML
INJECTION, SOLUTION INTRAVENOUS ONCE
Status: COMPLETED | OUTPATIENT
Start: 2021-01-06 | End: 2021-01-06

## 2021-01-05 RX ADMIN — SEVELAMER CARBONATE 800 MG: 800 TABLET, FILM COATED ORAL at 05:01

## 2021-01-05 RX ADMIN — PHENYTOIN SODIUM 100 MG: 100 CAPSULE ORAL at 02:01

## 2021-01-05 RX ADMIN — MUPIROCIN: 20 OINTMENT TOPICAL at 09:01

## 2021-01-05 RX ADMIN — PHENYTOIN SODIUM 100 MG: 100 CAPSULE ORAL at 09:01

## 2021-01-05 RX ADMIN — HYDRALAZINE HYDROCHLORIDE 25 MG: 25 TABLET, FILM COATED ORAL at 06:01

## 2021-01-05 RX ADMIN — VANCOMYCIN HYDROCHLORIDE 1250 MG: 1.25 INJECTION, POWDER, LYOPHILIZED, FOR SOLUTION INTRAVENOUS at 11:01

## 2021-01-05 RX ADMIN — LABETALOL HYDROCHLORIDE 100 MG: 100 TABLET, FILM COATED ORAL at 09:01

## 2021-01-05 RX ADMIN — AMLODIPINE BESYLATE 10 MG: 10 TABLET ORAL at 09:01

## 2021-01-05 RX ADMIN — HYDRALAZINE HYDROCHLORIDE 25 MG: 25 TABLET, FILM COATED ORAL at 02:01

## 2021-01-05 RX ADMIN — SEVELAMER CARBONATE 800 MG: 800 TABLET, FILM COATED ORAL at 12:01

## 2021-01-05 RX ADMIN — ATORVASTATIN CALCIUM 80 MG: 20 TABLET, FILM COATED ORAL at 09:01

## 2021-01-05 RX ADMIN — CALCITRIOL CAPSULES 0.25 MCG 0.25 MCG: 0.25 CAPSULE ORAL at 09:01

## 2021-01-05 RX ADMIN — CEFEPIME 1 G: 1 INJECTION, POWDER, FOR SOLUTION INTRAMUSCULAR; INTRAVENOUS at 11:01

## 2021-01-05 RX ADMIN — TAMSULOSIN HYDROCHLORIDE 0.8 MG: 0.4 CAPSULE ORAL at 09:01

## 2021-01-05 RX ADMIN — HYDRALAZINE HYDROCHLORIDE 25 MG: 25 TABLET, FILM COATED ORAL at 10:01

## 2021-01-06 PROBLEM — R91.1 PULMONARY NODULE: Status: ACTIVE | Noted: 2021-01-06

## 2021-01-06 PROBLEM — Z86.718 HISTORY OF DEEP VEIN THROMBOSIS (DVT) OF LOWER EXTREMITY: Status: ACTIVE | Noted: 2021-01-06

## 2021-01-06 LAB
ALBUMIN SERPL BCP-MCNC: 1.8 G/DL (ref 3.5–5.2)
ALP SERPL-CCNC: 169 U/L (ref 55–135)
ALT SERPL W/O P-5'-P-CCNC: 96 U/L (ref 10–44)
ANION GAP SERPL CALC-SCNC: 12 MMOL/L (ref 8–16)
ANISOCYTOSIS BLD QL SMEAR: SLIGHT
ASCENDING AORTA: 2.78 CM
AST SERPL-CCNC: 209 U/L (ref 10–40)
AV INDEX (PROSTH): 0.73
AV MEAN GRADIENT: 3 MMHG
AV PEAK GRADIENT: 5 MMHG
AV VALVE AREA: 2.79 CM2
AV VELOCITY RATIO: 0.7
BACTERIA #/AREA URNS AUTO: ABNORMAL /HPF
BASOPHILS # BLD AUTO: 0.02 K/UL (ref 0–0.2)
BASOPHILS NFR BLD: 0.9 % (ref 0–1.9)
BILIRUB SERPL-MCNC: 0.6 MG/DL (ref 0.1–1)
BILIRUB UR QL STRIP: NEGATIVE
BSA FOR ECHO PROCEDURE: 1.98 M2
BUN SERPL-MCNC: 59 MG/DL (ref 8–23)
BURR CELLS BLD QL SMEAR: ABNORMAL
CALCIUM SERPL-MCNC: 5.8 MG/DL (ref 8.7–10.5)
CHLORIDE SERPL-SCNC: 93 MMOL/L (ref 95–110)
CLARITY UR REFRACT.AUTO: ABNORMAL
CO2 SERPL-SCNC: 23 MMOL/L (ref 23–29)
COLOR UR AUTO: ABNORMAL
CREAT SERPL-MCNC: 9.3 MG/DL (ref 0.5–1.4)
CV ECHO LV RWT: 0.27 CM
DACRYOCYTES BLD QL SMEAR: ABNORMAL
DIFFERENTIAL METHOD: ABNORMAL
DOP CALC AO PEAK VEL: 1.14 M/S
DOP CALC AO VTI: 17.94 CM
DOP CALC LVOT AREA: 3.8 CM2
DOP CALC LVOT DIAMETER: 2.21 CM
DOP CALC LVOT PEAK VEL: 0.8 M/S
DOP CALC LVOT STROKE VOLUME: 50.03 CM3
DOP CALCLVOT PEAK VEL VTI: 13.05 CM
E WAVE DECELERATION TIME: 173.62 MSEC
E/A RATIO: 0.6
E/E' RATIO: 9.33 M/S
ECHO LV POSTERIOR WALL: 0.7 CM (ref 0.6–1.1)
EOSINOPHIL # BLD AUTO: 0 K/UL (ref 0–0.5)
EOSINOPHIL NFR BLD: 0 % (ref 0–8)
ERYTHROCYTE [DISTWIDTH] IN BLOOD BY AUTOMATED COUNT: 15.4 % (ref 11.5–14.5)
EST. GFR  (AFRICAN AMERICAN): 6.1 ML/MIN/1.73 M^2
EST. GFR  (NON AFRICAN AMERICAN): 5.3 ML/MIN/1.73 M^2
FRACTIONAL SHORTENING: 31 % (ref 28–44)
GLUCOSE SERPL-MCNC: 88 MG/DL (ref 70–110)
GLUCOSE UR QL STRIP: ABNORMAL
HCT VFR BLD AUTO: 27.6 % (ref 40–54)
HGB BLD-MCNC: 9.6 G/DL (ref 14–18)
HGB UR QL STRIP: ABNORMAL
HYALINE CASTS UR QL AUTO: 0 /LPF
HYPOCHROMIA BLD QL SMEAR: ABNORMAL
IMM GRANULOCYTES # BLD AUTO: 0.02 K/UL (ref 0–0.04)
IMM GRANULOCYTES NFR BLD AUTO: 0.9 % (ref 0–0.5)
INTERVENTRICULAR SEPTUM: 0.81 CM (ref 0.6–1.1)
IVRT: 85.63 MSEC
KETONES UR QL STRIP: NEGATIVE
LA MAJOR: 5.07 CM
LA MINOR: 4.84 CM
LA WIDTH: 4.02 CM
LEFT ATRIUM SIZE: 4.15 CM
LEFT ATRIUM VOLUME INDEX MOD: 28.5 ML/M2
LEFT ATRIUM VOLUME INDEX: 35.9 ML/M2
LEFT ATRIUM VOLUME MOD: 55.77 CM3
LEFT ATRIUM VOLUME: 70.23 CM3
LEFT INTERNAL DIMENSION IN SYSTOLE: 3.56 CM (ref 2.1–4)
LEFT VENTRICLE DIASTOLIC VOLUME INDEX: 65.69 ML/M2
LEFT VENTRICLE DIASTOLIC VOLUME: 128.39 ML
LEFT VENTRICLE MASS INDEX: 69 G/M2
LEFT VENTRICLE SYSTOLIC VOLUME INDEX: 27.2 ML/M2
LEFT VENTRICLE SYSTOLIC VOLUME: 53.09 ML
LEFT VENTRICULAR INTERNAL DIMENSION IN DIASTOLE: 5.18 CM (ref 3.5–6)
LEFT VENTRICULAR MASS: 134.07 G
LEUKOCYTE ESTERASE UR QL STRIP: ABNORMAL
LV LATERAL E/E' RATIO: 9.33 M/S
LV SEPTAL E/E' RATIO: 9.33 M/S
LYMPHOCYTES # BLD AUTO: 0.5 K/UL (ref 1–4.8)
LYMPHOCYTES NFR BLD: 24 % (ref 18–48)
MCH RBC QN AUTO: 24 PG (ref 27–31)
MCHC RBC AUTO-ENTMCNC: 34.8 G/DL (ref 32–36)
MCV RBC AUTO: 69 FL (ref 82–98)
MICROSCOPIC COMMENT: ABNORMAL
MONOCYTES # BLD AUTO: 0.2 K/UL (ref 0.3–1)
MONOCYTES NFR BLD: 10.4 % (ref 4–15)
MV A" WAVE DURATION": 10.47 MSEC
MV PEAK A VEL: 0.93 M/S
MV PEAK E VEL: 0.56 M/S
NEUTROPHILS # BLD AUTO: 1.4 K/UL (ref 1.8–7.7)
NEUTROPHILS NFR BLD: 63.8 % (ref 38–73)
NITRITE UR QL STRIP: POSITIVE
NRBC BLD-RTO: 0 /100 WBC
OVALOCYTES BLD QL SMEAR: ABNORMAL
PH UR STRIP: 7 [PH] (ref 5–8)
PHENYTOIN SERPL-MCNC: 1.9 UG/ML (ref 10–20)
PISA TR MAX VEL: 2.09 M/S
PLATELET # BLD AUTO: 52 K/UL (ref 150–350)
PLATELET BLD QL SMEAR: ABNORMAL
PMV BLD AUTO: ABNORMAL FL (ref 9.2–12.9)
POCT GLUCOSE: 115 MG/DL (ref 70–110)
POCT GLUCOSE: 115 MG/DL (ref 70–110)
POCT GLUCOSE: 134 MG/DL (ref 70–110)
POCT GLUCOSE: 86 MG/DL (ref 70–110)
POCT GLUCOSE: 90 MG/DL (ref 70–110)
POCT GLUCOSE: 92 MG/DL (ref 70–110)
POIKILOCYTOSIS BLD QL SMEAR: ABNORMAL
POLYCHROMASIA BLD QL SMEAR: ABNORMAL
POTASSIUM SERPL-SCNC: 5.3 MMOL/L (ref 3.5–5.1)
PROT SERPL-MCNC: 4.4 G/DL (ref 6–8.4)
PROT UR QL STRIP: ABNORMAL
PULM VEIN S/D RATIO: 1.58
PV PEAK D VEL: 0.31 M/S
PV PEAK S VEL: 0.49 M/S
RA MAJOR: 4.67 CM
RA PRESSURE: 3 MMHG
RA WIDTH: 3.64 CM
RBC # BLD AUTO: 4 M/UL (ref 4.6–6.2)
RBC #/AREA URNS AUTO: >100 /HPF (ref 0–4)
RIGHT VENTRICULAR END-DIASTOLIC DIMENSION: 3.71 CM
RV TISSUE DOPPLER FREE WALL SYSTOLIC VELOCITY 1 (APICAL 4 CHAMBER VIEW): 17.16 CM/S
SINUS: 3.28 CM
SODIUM SERPL-SCNC: 128 MMOL/L (ref 136–145)
SP GR UR STRIP: 1.02 (ref 1–1.03)
STJ: 2.67 CM
TARGETS BLD QL SMEAR: ABNORMAL
TDI LATERAL: 0.06 M/S
TDI SEPTAL: 0.06 M/S
TDI: 0.06 M/S
TR MAX PG: 17 MMHG
TRICUSPID ANNULAR PLANE SYSTOLIC EXCURSION: 2.78 CM
TV REST PULMONARY ARTERY PRESSURE: 20 MMHG
URN SPEC COLLECT METH UR: ABNORMAL
VANCOMYCIN SERPL-MCNC: 18.9 UG/ML
WBC # BLD AUTO: 2.21 K/UL (ref 3.9–12.7)
WBC #/AREA URNS AUTO: 0 /HPF (ref 0–5)

## 2021-01-06 PROCEDURE — 25000003 PHARM REV CODE 250: Performed by: STUDENT IN AN ORGANIZED HEALTH CARE EDUCATION/TRAINING PROGRAM

## 2021-01-06 PROCEDURE — 99233 PR SUBSEQUENT HOSPITAL CARE,LEVL III: ICD-10-PCS | Mod: GC,,, | Performed by: STUDENT IN AN ORGANIZED HEALTH CARE EDUCATION/TRAINING PROGRAM

## 2021-01-06 PROCEDURE — 90935 HEMODIALYSIS ONE EVALUATION: CPT | Mod: ,,, | Performed by: INTERNAL MEDICINE

## 2021-01-06 PROCEDURE — 97162 PT EVAL MOD COMPLEX 30 MIN: CPT

## 2021-01-06 PROCEDURE — 63600175 PHARM REV CODE 636 W HCPCS: Performed by: STUDENT IN AN ORGANIZED HEALTH CARE EDUCATION/TRAINING PROGRAM

## 2021-01-06 PROCEDURE — 36415 COLL VENOUS BLD VENIPUNCTURE: CPT

## 2021-01-06 PROCEDURE — 81001 URINALYSIS AUTO W/SCOPE: CPT

## 2021-01-06 PROCEDURE — 80185 ASSAY OF PHENYTOIN TOTAL: CPT

## 2021-01-06 PROCEDURE — 80053 COMPREHEN METABOLIC PANEL: CPT

## 2021-01-06 PROCEDURE — 80100016 HC MAINTENANCE HEMODIALYSIS

## 2021-01-06 PROCEDURE — 90935 PR HEMODIALYSIS, ONE EVALUATION: ICD-10-PCS | Mod: ,,, | Performed by: INTERNAL MEDICINE

## 2021-01-06 PROCEDURE — 80202 ASSAY OF VANCOMYCIN: CPT

## 2021-01-06 PROCEDURE — 25500020 PHARM REV CODE 255: Performed by: STUDENT IN AN ORGANIZED HEALTH CARE EDUCATION/TRAINING PROGRAM

## 2021-01-06 PROCEDURE — 97530 THERAPEUTIC ACTIVITIES: CPT

## 2021-01-06 PROCEDURE — 85025 COMPLETE CBC W/AUTO DIFF WBC: CPT

## 2021-01-06 PROCEDURE — 99233 SBSQ HOSP IP/OBS HIGH 50: CPT | Mod: GC,,, | Performed by: STUDENT IN AN ORGANIZED HEALTH CARE EDUCATION/TRAINING PROGRAM

## 2021-01-06 PROCEDURE — 20600001 HC STEP DOWN PRIVATE ROOM

## 2021-01-06 RX ORDER — VANCOMYCIN HCL IN 5 % DEXTROSE 1G/250ML
1000 PLASTIC BAG, INJECTION (ML) INTRAVENOUS
Status: DISCONTINUED | OUTPATIENT
Start: 2021-01-06 | End: 2021-01-09

## 2021-01-06 RX ORDER — GENTAMICIN SULFATE 40 MG/ML
80 INJECTION, SOLUTION INTRAMUSCULAR; INTRAVENOUS
Status: DISCONTINUED | OUTPATIENT
Start: 2021-01-06 | End: 2021-01-12 | Stop reason: HOSPADM

## 2021-01-06 RX ORDER — ACETAMINOPHEN 325 MG/1
650 TABLET ORAL ONCE AS NEEDED
Status: COMPLETED | OUTPATIENT
Start: 2021-01-06 | End: 2021-01-06

## 2021-01-06 RX ADMIN — PHENYTOIN SODIUM 100 MG: 100 CAPSULE ORAL at 08:01

## 2021-01-06 RX ADMIN — LABETALOL HYDROCHLORIDE 100 MG: 100 TABLET, FILM COATED ORAL at 09:01

## 2021-01-06 RX ADMIN — HYDRALAZINE HYDROCHLORIDE 25 MG: 25 TABLET, FILM COATED ORAL at 02:01

## 2021-01-06 RX ADMIN — PHENYTOIN SODIUM 100 MG: 100 CAPSULE ORAL at 02:01

## 2021-01-06 RX ADMIN — TAMSULOSIN HYDROCHLORIDE 0.8 MG: 0.4 CAPSULE ORAL at 08:01

## 2021-01-06 RX ADMIN — CEFEPIME 1 G: 1 INJECTION, POWDER, FOR SOLUTION INTRAMUSCULAR; INTRAVENOUS at 02:01

## 2021-01-06 RX ADMIN — SODIUM CHLORIDE 100 ML/HR: 0.9 INJECTION, SOLUTION INTRAVENOUS at 10:01

## 2021-01-06 RX ADMIN — ACETAMINOPHEN 650 MG: 500 TABLET ORAL at 08:01

## 2021-01-06 RX ADMIN — ATORVASTATIN CALCIUM 80 MG: 20 TABLET, FILM COATED ORAL at 08:01

## 2021-01-06 RX ADMIN — EPOETIN ALFA-EPBX 4100 UNITS: 10000 INJECTION, SOLUTION INTRAVENOUS; SUBCUTANEOUS at 01:01

## 2021-01-06 RX ADMIN — HYDRALAZINE HYDROCHLORIDE 25 MG: 25 TABLET, FILM COATED ORAL at 06:01

## 2021-01-06 RX ADMIN — MUPIROCIN: 20 OINTMENT TOPICAL at 09:01

## 2021-01-06 RX ADMIN — CALCITRIOL CAPSULES 0.25 MCG 0.25 MCG: 0.25 CAPSULE ORAL at 08:01

## 2021-01-06 RX ADMIN — AMLODIPINE BESYLATE 10 MG: 10 TABLET ORAL at 08:01

## 2021-01-06 RX ADMIN — PHENYTOIN SODIUM 100 MG: 100 CAPSULE ORAL at 09:01

## 2021-01-06 RX ADMIN — IOHEXOL 100 ML: 350 INJECTION, SOLUTION INTRAVENOUS at 09:01

## 2021-01-06 RX ADMIN — LABETALOL HYDROCHLORIDE 100 MG: 100 TABLET, FILM COATED ORAL at 08:01

## 2021-01-06 RX ADMIN — VANCOMYCIN HYDROCHLORIDE 1000 MG: 1 INJECTION, POWDER, LYOPHILIZED, FOR SOLUTION INTRAVENOUS at 03:01

## 2021-01-06 RX ADMIN — SEVELAMER CARBONATE 800 MG: 800 TABLET, FILM COATED ORAL at 05:01

## 2021-01-06 RX ADMIN — HYDRALAZINE HYDROCHLORIDE 25 MG: 25 TABLET, FILM COATED ORAL at 09:01

## 2021-01-06 RX ADMIN — GENTAMICIN SULFATE 80 MG: 40 INJECTION, SOLUTION INTRAMUSCULAR; INTRAVENOUS at 01:01

## 2021-01-07 LAB
25(OH)D3+25(OH)D2 SERPL-MCNC: 8 NG/ML (ref 30–96)
ALBUMIN SERPL BCP-MCNC: 1.9 G/DL (ref 3.5–5.2)
ALP SERPL-CCNC: 192 U/L (ref 55–135)
ALT SERPL W/O P-5'-P-CCNC: 115 U/L (ref 10–44)
ANION GAP SERPL CALC-SCNC: 10 MMOL/L (ref 8–16)
ANISOCYTOSIS BLD QL SMEAR: SLIGHT
AST SERPL-CCNC: 252 U/L (ref 10–40)
BACTERIA BLD CULT: ABNORMAL
BASOPHILS # BLD AUTO: 0.02 K/UL (ref 0–0.2)
BASOPHILS NFR BLD: 0.9 % (ref 0–1.9)
BILIRUB SERPL-MCNC: 0.8 MG/DL (ref 0.1–1)
BUN SERPL-MCNC: 36 MG/DL (ref 8–23)
BURR CELLS BLD QL SMEAR: ABNORMAL
C DIFF GDH STL QL: NEGATIVE
C DIFF TOX A+B STL QL IA: NEGATIVE
CALCIUM SERPL-MCNC: 6.7 MG/DL (ref 8.7–10.5)
CHLORIDE SERPL-SCNC: 97 MMOL/L (ref 95–110)
CO2 SERPL-SCNC: 21 MMOL/L (ref 23–29)
CREAT SERPL-MCNC: 6.7 MG/DL (ref 0.5–1.4)
DIFFERENTIAL METHOD: ABNORMAL
EOSINOPHIL # BLD AUTO: 0 K/UL (ref 0–0.5)
EOSINOPHIL NFR BLD: 0 % (ref 0–8)
ERYTHROCYTE [DISTWIDTH] IN BLOOD BY AUTOMATED COUNT: 15.4 % (ref 11.5–14.5)
EST. GFR  (AFRICAN AMERICAN): 9.1 ML/MIN/1.73 M^2
EST. GFR  (NON AFRICAN AMERICAN): 7.8 ML/MIN/1.73 M^2
GLUCOSE SERPL-MCNC: 92 MG/DL (ref 70–110)
HCT VFR BLD AUTO: 30.4 % (ref 40–54)
HGB BLD-MCNC: 10.4 G/DL (ref 14–18)
HYPOCHROMIA BLD QL SMEAR: ABNORMAL
IMM GRANULOCYTES # BLD AUTO: 0.01 K/UL (ref 0–0.04)
IMM GRANULOCYTES NFR BLD AUTO: 0.5 % (ref 0–0.5)
LYMPHOCYTES # BLD AUTO: 0.3 K/UL (ref 1–4.8)
LYMPHOCYTES NFR BLD: 14.2 % (ref 18–48)
MCH RBC QN AUTO: 24.5 PG (ref 27–31)
MCHC RBC AUTO-ENTMCNC: 34.2 G/DL (ref 32–36)
MCV RBC AUTO: 72 FL (ref 82–98)
MONOCYTES # BLD AUTO: 0.1 K/UL (ref 0.3–1)
MONOCYTES NFR BLD: 6.4 % (ref 4–15)
NEUTROPHILS # BLD AUTO: 1.7 K/UL (ref 1.8–7.7)
NEUTROPHILS NFR BLD: 78 % (ref 38–73)
NRBC BLD-RTO: 0 /100 WBC
OVALOCYTES BLD QL SMEAR: ABNORMAL
PLATELET # BLD AUTO: 59 K/UL (ref 150–350)
PLATELET BLD QL SMEAR: ABNORMAL
PMV BLD AUTO: ABNORMAL FL (ref 9.2–12.9)
POCT GLUCOSE: 101 MG/DL (ref 70–110)
POCT GLUCOSE: 114 MG/DL (ref 70–110)
POCT GLUCOSE: 96 MG/DL (ref 70–110)
POCT GLUCOSE: 97 MG/DL (ref 70–110)
POIKILOCYTOSIS BLD QL SMEAR: SLIGHT
POLYCHROMASIA BLD QL SMEAR: ABNORMAL
POTASSIUM SERPL-SCNC: 4.8 MMOL/L (ref 3.5–5.1)
PROT SERPL-MCNC: 4.7 G/DL (ref 6–8.4)
RBC # BLD AUTO: 4.25 M/UL (ref 4.6–6.2)
SODIUM SERPL-SCNC: 128 MMOL/L (ref 136–145)
TARGETS BLD QL SMEAR: ABNORMAL
VANCOMYCIN SERPL-MCNC: 24.1 UG/ML
WBC # BLD AUTO: 2.18 K/UL (ref 3.9–12.7)

## 2021-01-07 PROCEDURE — 99233 SBSQ HOSP IP/OBS HIGH 50: CPT | Mod: GC,,, | Performed by: STUDENT IN AN ORGANIZED HEALTH CARE EDUCATION/TRAINING PROGRAM

## 2021-01-07 PROCEDURE — 25000003 PHARM REV CODE 250: Performed by: STUDENT IN AN ORGANIZED HEALTH CARE EDUCATION/TRAINING PROGRAM

## 2021-01-07 PROCEDURE — 63600175 PHARM REV CODE 636 W HCPCS: Performed by: STUDENT IN AN ORGANIZED HEALTH CARE EDUCATION/TRAINING PROGRAM

## 2021-01-07 PROCEDURE — 20600001 HC STEP DOWN PRIVATE ROOM

## 2021-01-07 PROCEDURE — 99233 PR SUBSEQUENT HOSPITAL CARE,LEVL III: ICD-10-PCS | Mod: GC,,, | Performed by: STUDENT IN AN ORGANIZED HEALTH CARE EDUCATION/TRAINING PROGRAM

## 2021-01-07 PROCEDURE — 82306 VITAMIN D 25 HYDROXY: CPT

## 2021-01-07 PROCEDURE — 87324 CLOSTRIDIUM AG IA: CPT

## 2021-01-07 PROCEDURE — 80202 ASSAY OF VANCOMYCIN: CPT

## 2021-01-07 PROCEDURE — 97112 NEUROMUSCULAR REEDUCATION: CPT

## 2021-01-07 PROCEDURE — 85025 COMPLETE CBC W/AUTO DIFF WBC: CPT

## 2021-01-07 PROCEDURE — 87449 NOS EACH ORGANISM AG IA: CPT

## 2021-01-07 PROCEDURE — 80053 COMPREHEN METABOLIC PANEL: CPT

## 2021-01-07 RX ORDER — BENZONATATE 100 MG/1
100 CAPSULE ORAL 3 TIMES DAILY PRN
Status: DISCONTINUED | OUTPATIENT
Start: 2021-01-07 | End: 2021-01-12 | Stop reason: HOSPADM

## 2021-01-07 RX ORDER — PARICALCITOL 5 UG/ML
0.1 INJECTION, SOLUTION INTRAVENOUS
Status: DISCONTINUED | OUTPATIENT
Start: 2021-01-08 | End: 2021-01-12 | Stop reason: HOSPADM

## 2021-01-07 RX ORDER — SODIUM CHLORIDE 9 MG/ML
INJECTION, SOLUTION INTRAVENOUS ONCE
Status: COMPLETED | OUTPATIENT
Start: 2021-01-08 | End: 2021-01-08

## 2021-01-07 RX ADMIN — AMLODIPINE BESYLATE 10 MG: 10 TABLET ORAL at 08:01

## 2021-01-07 RX ADMIN — HYDRALAZINE HYDROCHLORIDE 25 MG: 25 TABLET, FILM COATED ORAL at 03:01

## 2021-01-07 RX ADMIN — HYDRALAZINE HYDROCHLORIDE 25 MG: 25 TABLET, FILM COATED ORAL at 10:01

## 2021-01-07 RX ADMIN — LABETALOL HYDROCHLORIDE 100 MG: 100 TABLET, FILM COATED ORAL at 08:01

## 2021-01-07 RX ADMIN — SEVELAMER CARBONATE 800 MG: 800 TABLET, FILM COATED ORAL at 08:01

## 2021-01-07 RX ADMIN — MUPIROCIN: 20 OINTMENT TOPICAL at 08:01

## 2021-01-07 RX ADMIN — LABETALOL HYDROCHLORIDE 100 MG: 100 TABLET, FILM COATED ORAL at 09:01

## 2021-01-07 RX ADMIN — CEFEPIME 1 G: 1 INJECTION, POWDER, FOR SOLUTION INTRAMUSCULAR; INTRAVENOUS at 12:01

## 2021-01-07 RX ADMIN — TAMSULOSIN HYDROCHLORIDE 0.8 MG: 0.4 CAPSULE ORAL at 08:01

## 2021-01-07 RX ADMIN — ATORVASTATIN CALCIUM 80 MG: 20 TABLET, FILM COATED ORAL at 08:01

## 2021-01-07 RX ADMIN — PHENYTOIN SODIUM 100 MG: 100 CAPSULE ORAL at 03:01

## 2021-01-07 RX ADMIN — SEVELAMER CARBONATE 800 MG: 800 TABLET, FILM COATED ORAL at 12:01

## 2021-01-07 RX ADMIN — PHENYTOIN SODIUM 100 MG: 100 CAPSULE ORAL at 08:01

## 2021-01-07 RX ADMIN — HYDRALAZINE HYDROCHLORIDE 25 MG: 25 TABLET, FILM COATED ORAL at 06:01

## 2021-01-07 RX ADMIN — MUPIROCIN: 20 OINTMENT TOPICAL at 09:01

## 2021-01-07 RX ADMIN — CALCITRIOL CAPSULES 0.25 MCG 0.25 MCG: 0.25 CAPSULE ORAL at 08:01

## 2021-01-07 RX ADMIN — BENZONATATE 100 MG: 100 CAPSULE ORAL at 10:01

## 2021-01-07 RX ADMIN — PHENYTOIN SODIUM 100 MG: 100 CAPSULE ORAL at 09:01

## 2021-01-07 RX ADMIN — SEVELAMER CARBONATE 800 MG: 800 TABLET, FILM COATED ORAL at 06:01

## 2021-01-08 PROBLEM — E87.5 HYPERKALEMIA: Status: RESOLVED | Noted: 2021-01-04 | Resolved: 2021-01-08

## 2021-01-08 PROBLEM — R50.9 FEVER: Status: ACTIVE | Noted: 2021-01-08

## 2021-01-08 LAB
ALBUMIN SERPL BCP-MCNC: 1.7 G/DL (ref 3.5–5.2)
ALP SERPL-CCNC: 189 U/L (ref 55–135)
ALT SERPL W/O P-5'-P-CCNC: 114 U/L (ref 10–44)
ANION GAP SERPL CALC-SCNC: 10 MMOL/L (ref 8–16)
ANISOCYTOSIS BLD QL SMEAR: SLIGHT
APTT BLDCRRT: 34.6 SEC (ref 21–32)
AST SERPL-CCNC: 251 U/L (ref 10–40)
BASOPHILS # BLD AUTO: ABNORMAL K/UL (ref 0–0.2)
BASOPHILS NFR BLD: 0 % (ref 0–1.9)
BILIRUB SERPL-MCNC: 0.6 MG/DL (ref 0.1–1)
BUN SERPL-MCNC: 47 MG/DL (ref 8–23)
BURR CELLS BLD QL SMEAR: ABNORMAL
CALCIUM SERPL-MCNC: 6.4 MG/DL (ref 8.7–10.5)
CHLORIDE SERPL-SCNC: 96 MMOL/L (ref 95–110)
CO2 SERPL-SCNC: 21 MMOL/L (ref 23–29)
CREAT SERPL-MCNC: 8.1 MG/DL (ref 0.5–1.4)
DIFFERENTIAL METHOD: ABNORMAL
EOSINOPHIL # BLD AUTO: ABNORMAL K/UL (ref 0–0.5)
EOSINOPHIL NFR BLD: 0 % (ref 0–8)
ERYTHROCYTE [DISTWIDTH] IN BLOOD BY AUTOMATED COUNT: 15.8 % (ref 11.5–14.5)
EST. GFR  (AFRICAN AMERICAN): 7.2 ML/MIN/1.73 M^2
EST. GFR  (NON AFRICAN AMERICAN): 6.2 ML/MIN/1.73 M^2
GLUCOSE SERPL-MCNC: 90 MG/DL (ref 70–110)
HCT VFR BLD AUTO: 27.2 % (ref 40–54)
HGB BLD-MCNC: 9.2 G/DL (ref 14–18)
HYPOCHROMIA BLD QL SMEAR: ABNORMAL
IMM GRANULOCYTES # BLD AUTO: ABNORMAL K/UL (ref 0–0.04)
IMM GRANULOCYTES NFR BLD AUTO: ABNORMAL % (ref 0–0.5)
INR PPP: 1.2 (ref 0.8–1.2)
LDH SERPL L TO P-CCNC: 669 U/L (ref 110–260)
LYMPHOCYTES # BLD AUTO: ABNORMAL K/UL (ref 1–4.8)
LYMPHOCYTES NFR BLD: 25 % (ref 18–48)
MCH RBC QN AUTO: 23.5 PG (ref 27–31)
MCHC RBC AUTO-ENTMCNC: 33.8 G/DL (ref 32–36)
MCV RBC AUTO: 69 FL (ref 82–98)
MONOCYTES # BLD AUTO: ABNORMAL K/UL (ref 0.3–1)
MONOCYTES NFR BLD: 5 % (ref 4–15)
NEUTROPHILS NFR BLD: 70 % (ref 38–73)
NRBC BLD-RTO: 0 /100 WBC
OVALOCYTES BLD QL SMEAR: ABNORMAL
PLATELET # BLD AUTO: 58 K/UL (ref 150–350)
PLATELET BLD QL SMEAR: ABNORMAL
PMV BLD AUTO: ABNORMAL FL (ref 9.2–12.9)
POCT GLUCOSE: 104 MG/DL (ref 70–110)
POCT GLUCOSE: 117 MG/DL (ref 70–110)
POCT GLUCOSE: 156 MG/DL (ref 70–110)
POCT GLUCOSE: 206 MG/DL (ref 70–110)
POIKILOCYTOSIS BLD QL SMEAR: SLIGHT
POLYCHROMASIA BLD QL SMEAR: ABNORMAL
POTASSIUM SERPL-SCNC: 4.8 MMOL/L (ref 3.5–5.1)
PROT SERPL-MCNC: 4.3 G/DL (ref 6–8.4)
PROTHROMBIN TIME: 12.8 SEC (ref 9–12.5)
PTH-INTACT SERPL-MCNC: 441 PG/ML (ref 9–77)
RBC # BLD AUTO: 3.92 M/UL (ref 4.6–6.2)
SODIUM SERPL-SCNC: 127 MMOL/L (ref 136–145)
TARGETS BLD QL SMEAR: ABNORMAL
VANCOMYCIN SERPL-MCNC: 19.7 UG/ML
WBC # BLD AUTO: 1.95 K/UL (ref 3.9–12.7)

## 2021-01-08 PROCEDURE — 90935 HEMODIALYSIS ONE EVALUATION: CPT | Mod: ,,, | Performed by: INTERNAL MEDICINE

## 2021-01-08 PROCEDURE — 80202 ASSAY OF VANCOMYCIN: CPT

## 2021-01-08 PROCEDURE — 25000003 PHARM REV CODE 250: Performed by: STUDENT IN AN ORGANIZED HEALTH CARE EDUCATION/TRAINING PROGRAM

## 2021-01-08 PROCEDURE — 85007 BL SMEAR W/DIFF WBC COUNT: CPT

## 2021-01-08 PROCEDURE — 83615 LACTATE (LD) (LDH) ENZYME: CPT

## 2021-01-08 PROCEDURE — 63600175 PHARM REV CODE 636 W HCPCS: Performed by: STUDENT IN AN ORGANIZED HEALTH CARE EDUCATION/TRAINING PROGRAM

## 2021-01-08 PROCEDURE — 99233 PR SUBSEQUENT HOSPITAL CARE,LEVL III: ICD-10-PCS | Mod: GC,,, | Performed by: STUDENT IN AN ORGANIZED HEALTH CARE EDUCATION/TRAINING PROGRAM

## 2021-01-08 PROCEDURE — 99223 PR INITIAL HOSPITAL CARE,LEVL III: ICD-10-PCS | Mod: GC,,, | Performed by: INTERNAL MEDICINE

## 2021-01-08 PROCEDURE — 36415 COLL VENOUS BLD VENIPUNCTURE: CPT

## 2021-01-08 PROCEDURE — 99233 SBSQ HOSP IP/OBS HIGH 50: CPT | Mod: GC,,, | Performed by: STUDENT IN AN ORGANIZED HEALTH CARE EDUCATION/TRAINING PROGRAM

## 2021-01-08 PROCEDURE — 85027 COMPLETE CBC AUTOMATED: CPT

## 2021-01-08 PROCEDURE — 80053 COMPREHEN METABOLIC PANEL: CPT

## 2021-01-08 PROCEDURE — 86038 ANTINUCLEAR ANTIBODIES: CPT

## 2021-01-08 PROCEDURE — 80100016 HC MAINTENANCE HEMODIALYSIS

## 2021-01-08 PROCEDURE — 20600001 HC STEP DOWN PRIVATE ROOM

## 2021-01-08 PROCEDURE — 85730 THROMBOPLASTIN TIME PARTIAL: CPT

## 2021-01-08 PROCEDURE — 99223 1ST HOSP IP/OBS HIGH 75: CPT | Mod: GC,,, | Performed by: INTERNAL MEDICINE

## 2021-01-08 PROCEDURE — 90935 PR HEMODIALYSIS, ONE EVALUATION: ICD-10-PCS | Mod: ,,, | Performed by: INTERNAL MEDICINE

## 2021-01-08 PROCEDURE — 83970 ASSAY OF PARATHORMONE: CPT

## 2021-01-08 PROCEDURE — 85610 PROTHROMBIN TIME: CPT

## 2021-01-08 RX ORDER — ACETAMINOPHEN 325 MG/1
650 TABLET ORAL EVERY 6 HOURS PRN
Status: DISCONTINUED | OUTPATIENT
Start: 2021-01-08 | End: 2021-01-12 | Stop reason: HOSPADM

## 2021-01-08 RX ORDER — HEPARIN SODIUM,PORCINE/D5W 25000/250
0-40 INTRAVENOUS SOLUTION INTRAVENOUS CONTINUOUS
Status: DISCONTINUED | OUTPATIENT
Start: 2021-01-09 | End: 2021-01-10

## 2021-01-08 RX ORDER — LEVETIRACETAM 250 MG/1
250 TABLET ORAL 2 TIMES DAILY
Status: DISCONTINUED | OUTPATIENT
Start: 2021-01-08 | End: 2021-01-12 | Stop reason: HOSPADM

## 2021-01-08 RX ORDER — LEVETIRACETAM 250 MG/1
250 TABLET ORAL
Status: DISCONTINUED | OUTPATIENT
Start: 2021-01-11 | End: 2021-01-12 | Stop reason: HOSPADM

## 2021-01-08 RX ORDER — LOPERAMIDE HYDROCHLORIDE 2 MG/1
2 CAPSULE ORAL 4 TIMES DAILY PRN
Status: DISCONTINUED | OUTPATIENT
Start: 2021-01-08 | End: 2021-01-08

## 2021-01-08 RX ORDER — LEVETIRACETAM 5 MG/ML
500 INJECTION INTRAVASCULAR ONCE
Status: COMPLETED | OUTPATIENT
Start: 2021-01-08 | End: 2021-01-08

## 2021-01-08 RX ADMIN — APIXABAN 5 MG: 5 TABLET, FILM COATED ORAL at 12:01

## 2021-01-08 RX ADMIN — ATORVASTATIN CALCIUM 80 MG: 20 TABLET, FILM COATED ORAL at 12:01

## 2021-01-08 RX ADMIN — LABETALOL HYDROCHLORIDE 100 MG: 100 TABLET, FILM COATED ORAL at 12:01

## 2021-01-08 RX ADMIN — PHENYTOIN SODIUM 100 MG: 100 CAPSULE ORAL at 12:01

## 2021-01-08 RX ADMIN — EPOETIN ALFA-EPBX 4300 UNITS: 10000 INJECTION, SOLUTION INTRAVENOUS; SUBCUTANEOUS at 10:01

## 2021-01-08 RX ADMIN — MUPIROCIN: 20 OINTMENT TOPICAL at 12:01

## 2021-01-08 RX ADMIN — LEVETIRACETAM INJECTION 500 MG: 5 INJECTION INTRAVENOUS at 05:01

## 2021-01-08 RX ADMIN — TAMSULOSIN HYDROCHLORIDE 0.8 MG: 0.4 CAPSULE ORAL at 12:01

## 2021-01-08 RX ADMIN — SEVELAMER CARBONATE 800 MG: 800 TABLET, FILM COATED ORAL at 12:01

## 2021-01-08 RX ADMIN — CALCITRIOL CAPSULES 0.25 MCG 0.25 MCG: 0.25 CAPSULE ORAL at 12:01

## 2021-01-08 RX ADMIN — ACETAMINOPHEN 650 MG: 325 TABLET ORAL at 03:01

## 2021-01-08 RX ADMIN — SEVELAMER CARBONATE 800 MG: 800 TABLET, FILM COATED ORAL at 05:01

## 2021-01-08 RX ADMIN — VANCOMYCIN HYDROCHLORIDE 1000 MG: 1 INJECTION, POWDER, LYOPHILIZED, FOR SOLUTION INTRAVENOUS at 12:01

## 2021-01-08 RX ADMIN — LOPERAMIDE HYDROCHLORIDE 2 MG: 2 CAPSULE ORAL at 04:01

## 2021-01-08 RX ADMIN — LEVETIRACETAM 250 MG: 250 TABLET ORAL at 08:01

## 2021-01-08 RX ADMIN — HYDRALAZINE HYDROCHLORIDE 25 MG: 25 TABLET, FILM COATED ORAL at 05:01

## 2021-01-08 RX ADMIN — SODIUM CHLORIDE 100 ML/HR: 0.9 INJECTION, SOLUTION INTRAVENOUS at 07:01

## 2021-01-08 RX ADMIN — LABETALOL HYDROCHLORIDE 100 MG: 100 TABLET, FILM COATED ORAL at 08:01

## 2021-01-08 RX ADMIN — HYDRALAZINE HYDROCHLORIDE 25 MG: 25 TABLET, FILM COATED ORAL at 03:01

## 2021-01-08 RX ADMIN — BENZONATATE 100 MG: 100 CAPSULE ORAL at 05:01

## 2021-01-08 RX ADMIN — CEFEPIME 1 G: 1 INJECTION, POWDER, FOR SOLUTION INTRAMUSCULAR; INTRAVENOUS at 12:01

## 2021-01-08 RX ADMIN — SEVELAMER CARBONATE 800 MG: 800 TABLET, FILM COATED ORAL at 09:01

## 2021-01-08 RX ADMIN — MUPIROCIN: 20 OINTMENT TOPICAL at 09:01

## 2021-01-08 RX ADMIN — HYDRALAZINE HYDROCHLORIDE 25 MG: 25 TABLET, FILM COATED ORAL at 10:01

## 2021-01-08 RX ADMIN — ACETAMINOPHEN 650 MG: 325 TABLET ORAL at 12:01

## 2021-01-08 RX ADMIN — GENTAMICIN SULFATE 80 MG: 40 INJECTION, SOLUTION INTRAMUSCULAR; INTRAVENOUS at 10:01

## 2021-01-08 RX ADMIN — AMLODIPINE BESYLATE 10 MG: 10 TABLET ORAL at 12:01

## 2021-01-09 LAB
ALBUMIN SERPL BCP-MCNC: 1.7 G/DL (ref 3.5–5.2)
ALP SERPL-CCNC: 205 U/L (ref 55–135)
ALT SERPL W/O P-5'-P-CCNC: 123 U/L (ref 10–44)
ANION GAP SERPL CALC-SCNC: 12 MMOL/L (ref 8–16)
APTT BLDCRRT: 103.6 SEC (ref 21–32)
APTT BLDCRRT: 118.4 SEC (ref 21–32)
APTT BLDCRRT: 89.7 SEC (ref 21–32)
AST SERPL-CCNC: 254 U/L (ref 10–40)
BACTERIA BLD CULT: NORMAL
BASOPHILS # BLD AUTO: 0.01 K/UL (ref 0–0.2)
BASOPHILS NFR BLD: 0.5 % (ref 0–1.9)
BILIRUB SERPL-MCNC: 0.6 MG/DL (ref 0.1–1)
BUN SERPL-MCNC: 32 MG/DL (ref 8–23)
CALCIUM SERPL-MCNC: 6.6 MG/DL (ref 8.7–10.5)
CHLORIDE SERPL-SCNC: 94 MMOL/L (ref 95–110)
CO2 SERPL-SCNC: 22 MMOL/L (ref 23–29)
CREAT SERPL-MCNC: 6.2 MG/DL (ref 0.5–1.4)
DIFFERENTIAL METHOD: ABNORMAL
EOSINOPHIL # BLD AUTO: 0 K/UL (ref 0–0.5)
EOSINOPHIL NFR BLD: 0.5 % (ref 0–8)
ERYTHROCYTE [DISTWIDTH] IN BLOOD BY AUTOMATED COUNT: 15.5 % (ref 11.5–14.5)
EST. GFR  (AFRICAN AMERICAN): 9.9 ML/MIN/1.73 M^2
EST. GFR  (NON AFRICAN AMERICAN): 8.6 ML/MIN/1.73 M^2
GLUCOSE SERPL-MCNC: 114 MG/DL (ref 70–110)
HCT VFR BLD AUTO: 26.7 % (ref 40–54)
HGB BLD-MCNC: 9.1 G/DL (ref 14–18)
IMM GRANULOCYTES # BLD AUTO: 0.01 K/UL (ref 0–0.04)
IMM GRANULOCYTES NFR BLD AUTO: 0.5 % (ref 0–0.5)
LYMPHOCYTES # BLD AUTO: 0.6 K/UL (ref 1–4.8)
LYMPHOCYTES NFR BLD: 25 % (ref 18–48)
MCH RBC QN AUTO: 23.9 PG (ref 27–31)
MCHC RBC AUTO-ENTMCNC: 34.1 G/DL (ref 32–36)
MCV RBC AUTO: 70 FL (ref 82–98)
MONOCYTES # BLD AUTO: 0.2 K/UL (ref 0.3–1)
MONOCYTES NFR BLD: 10.9 % (ref 4–15)
NEUTROPHILS # BLD AUTO: 1.4 K/UL (ref 1.8–7.7)
NEUTROPHILS NFR BLD: 62.6 % (ref 38–73)
NRBC BLD-RTO: 0 /100 WBC
PLATELET # BLD AUTO: 58 K/UL (ref 150–350)
PMV BLD AUTO: ABNORMAL FL (ref 9.2–12.9)
POCT GLUCOSE: 101 MG/DL (ref 70–110)
POCT GLUCOSE: 114 MG/DL (ref 70–110)
POCT GLUCOSE: 120 MG/DL (ref 70–110)
POTASSIUM SERPL-SCNC: 4.4 MMOL/L (ref 3.5–5.1)
PROCALCITONIN SERPL IA-MCNC: 1.64 NG/ML
PROT SERPL-MCNC: 4.3 G/DL (ref 6–8.4)
RBC # BLD AUTO: 3.8 M/UL (ref 4.6–6.2)
SODIUM SERPL-SCNC: 128 MMOL/L (ref 136–145)
WBC # BLD AUTO: 2.2 K/UL (ref 3.9–12.7)

## 2021-01-09 PROCEDURE — 85025 COMPLETE CBC W/AUTO DIFF WBC: CPT

## 2021-01-09 PROCEDURE — 36415 COLL VENOUS BLD VENIPUNCTURE: CPT

## 2021-01-09 PROCEDURE — 86704 HEP B CORE ANTIBODY TOTAL: CPT

## 2021-01-09 PROCEDURE — 85730 THROMBOPLASTIN TIME PARTIAL: CPT | Mod: 91

## 2021-01-09 PROCEDURE — 25000003 PHARM REV CODE 250: Performed by: STUDENT IN AN ORGANIZED HEALTH CARE EDUCATION/TRAINING PROGRAM

## 2021-01-09 PROCEDURE — 63600175 PHARM REV CODE 636 W HCPCS: Performed by: STUDENT IN AN ORGANIZED HEALTH CARE EDUCATION/TRAINING PROGRAM

## 2021-01-09 PROCEDURE — 99233 PR SUBSEQUENT HOSPITAL CARE,LEVL III: ICD-10-PCS | Mod: GC,,, | Performed by: STUDENT IN AN ORGANIZED HEALTH CARE EDUCATION/TRAINING PROGRAM

## 2021-01-09 PROCEDURE — 80053 COMPREHEN METABOLIC PANEL: CPT

## 2021-01-09 PROCEDURE — 85730 THROMBOPLASTIN TIME PARTIAL: CPT

## 2021-01-09 PROCEDURE — 99233 SBSQ HOSP IP/OBS HIGH 50: CPT | Mod: GC,,, | Performed by: INTERNAL MEDICINE

## 2021-01-09 PROCEDURE — 99233 SBSQ HOSP IP/OBS HIGH 50: CPT | Mod: GC,,, | Performed by: STUDENT IN AN ORGANIZED HEALTH CARE EDUCATION/TRAINING PROGRAM

## 2021-01-09 PROCEDURE — 20600001 HC STEP DOWN PRIVATE ROOM

## 2021-01-09 PROCEDURE — 84145 PROCALCITONIN (PCT): CPT

## 2021-01-09 PROCEDURE — 99233 PR SUBSEQUENT HOSPITAL CARE,LEVL III: ICD-10-PCS | Mod: GC,,, | Performed by: INTERNAL MEDICINE

## 2021-01-09 RX ORDER — SODIUM CHLORIDE 9 MG/ML
INJECTION, SOLUTION INTRAVENOUS
Status: DISCONTINUED | OUTPATIENT
Start: 2021-01-11 | End: 2021-01-12 | Stop reason: HOSPADM

## 2021-01-09 RX ORDER — CLOPIDOGREL BISULFATE 75 MG/1
75 TABLET ORAL DAILY
Status: DISCONTINUED | OUTPATIENT
Start: 2021-01-09 | End: 2021-01-09

## 2021-01-09 RX ORDER — SODIUM CHLORIDE 9 MG/ML
INJECTION, SOLUTION INTRAVENOUS ONCE
Status: COMPLETED | OUTPATIENT
Start: 2021-01-11 | End: 2021-01-11

## 2021-01-09 RX ADMIN — AMLODIPINE BESYLATE 10 MG: 10 TABLET ORAL at 09:01

## 2021-01-09 RX ADMIN — CALCITRIOL CAPSULES 0.25 MCG 0.25 MCG: 0.25 CAPSULE ORAL at 09:01

## 2021-01-09 RX ADMIN — HYDRALAZINE HYDROCHLORIDE 25 MG: 25 TABLET, FILM COATED ORAL at 09:01

## 2021-01-09 RX ADMIN — LEVETIRACETAM 250 MG: 250 TABLET ORAL at 09:01

## 2021-01-09 RX ADMIN — HEPARIN SODIUM AND DEXTROSE 11 UNITS/KG/HR: 10000; 5 INJECTION INTRAVENOUS at 11:01

## 2021-01-09 RX ADMIN — HEPARIN SODIUM AND DEXTROSE 14 UNITS/KG/HR: 10000; 5 INJECTION INTRAVENOUS at 02:01

## 2021-01-09 RX ADMIN — HEPARIN SODIUM AND DEXTROSE 18 UNITS/KG/HR: 10000; 5 INJECTION INTRAVENOUS at 01:01

## 2021-01-09 RX ADMIN — HYDRALAZINE HYDROCHLORIDE 25 MG: 25 TABLET, FILM COATED ORAL at 06:01

## 2021-01-09 RX ADMIN — TAMSULOSIN HYDROCHLORIDE 0.8 MG: 0.4 CAPSULE ORAL at 09:01

## 2021-01-09 RX ADMIN — SEVELAMER CARBONATE 800 MG: 800 TABLET, FILM COATED ORAL at 09:01

## 2021-01-09 RX ADMIN — SEVELAMER CARBONATE 800 MG: 800 TABLET, FILM COATED ORAL at 12:01

## 2021-01-09 RX ADMIN — MUPIROCIN: 20 OINTMENT TOPICAL at 09:01

## 2021-01-09 RX ADMIN — LABETALOL HYDROCHLORIDE 100 MG: 100 TABLET, FILM COATED ORAL at 09:01

## 2021-01-09 RX ADMIN — ATORVASTATIN CALCIUM 80 MG: 20 TABLET, FILM COATED ORAL at 09:01

## 2021-01-09 RX ADMIN — HYDRALAZINE HYDROCHLORIDE 25 MG: 25 TABLET, FILM COATED ORAL at 01:01

## 2021-01-09 RX ADMIN — CEFEPIME 1 G: 1 INJECTION, POWDER, FOR SOLUTION INTRAMUSCULAR; INTRAVENOUS at 01:01

## 2021-01-10 PROBLEM — R31.9 HEMATURIA: Status: ACTIVE | Noted: 2021-01-10

## 2021-01-10 LAB
ALBUMIN SERPL BCP-MCNC: 1.6 G/DL (ref 3.5–5.2)
ALP SERPL-CCNC: 223 U/L (ref 55–135)
ALT SERPL W/O P-5'-P-CCNC: 122 U/L (ref 10–44)
ANION GAP SERPL CALC-SCNC: 9 MMOL/L (ref 8–16)
APTT BLDCRRT: 38.4 SEC (ref 21–32)
APTT BLDCRRT: 53.3 SEC (ref 21–32)
APTT BLDCRRT: 76.5 SEC (ref 21–32)
AST SERPL-CCNC: 237 U/L (ref 10–40)
BACTERIA BLD CULT: NORMAL
BACTERIA BLD CULT: NORMAL
BASOPHILS # BLD AUTO: 0.02 K/UL (ref 0–0.2)
BASOPHILS NFR BLD: 0.9 % (ref 0–1.9)
BILIRUB SERPL-MCNC: 0.5 MG/DL (ref 0.1–1)
BUN SERPL-MCNC: 43 MG/DL (ref 8–23)
C DIFF GDH STL QL: NEGATIVE
C DIFF TOX A+B STL QL IA: NEGATIVE
CALCIUM SERPL-MCNC: 6.5 MG/DL (ref 8.7–10.5)
CHLORIDE SERPL-SCNC: 95 MMOL/L (ref 95–110)
CO2 SERPL-SCNC: 22 MMOL/L (ref 23–29)
CREAT SERPL-MCNC: 7.2 MG/DL (ref 0.5–1.4)
DAT IGG-SP REAG RBC-IMP: NORMAL
DIFFERENTIAL METHOD: ABNORMAL
EOSINOPHIL # BLD AUTO: 0 K/UL (ref 0–0.5)
EOSINOPHIL NFR BLD: 0.9 % (ref 0–8)
ERYTHROCYTE [DISTWIDTH] IN BLOOD BY AUTOMATED COUNT: 15.9 % (ref 11.5–14.5)
EST. GFR  (AFRICAN AMERICAN): 8.3 ML/MIN/1.73 M^2
EST. GFR  (NON AFRICAN AMERICAN): 7.2 ML/MIN/1.73 M^2
FERRITIN SERPL-MCNC: 3442 NG/ML (ref 20–300)
FOLATE SERPL-MCNC: 5.3 NG/ML (ref 4–24)
GLUCOSE SERPL-MCNC: 93 MG/DL (ref 70–110)
HAPTOGLOB SERPL-MCNC: 116 MG/DL (ref 30–250)
HCT VFR BLD AUTO: 26.4 % (ref 40–54)
HGB BLD-MCNC: 8.9 G/DL (ref 14–18)
IMM GRANULOCYTES # BLD AUTO: 0.01 K/UL (ref 0–0.04)
IMM GRANULOCYTES NFR BLD AUTO: 0.4 % (ref 0–0.5)
IRON SERPL-MCNC: 49 UG/DL (ref 45–160)
LYMPHOCYTES # BLD AUTO: 0.6 K/UL (ref 1–4.8)
LYMPHOCYTES NFR BLD: 26.4 % (ref 18–48)
MAGNESIUM SERPL-MCNC: 2 MG/DL (ref 1.6–2.6)
MCH RBC QN AUTO: 23.6 PG (ref 27–31)
MCHC RBC AUTO-ENTMCNC: 33.7 G/DL (ref 32–36)
MCV RBC AUTO: 70 FL (ref 82–98)
MONOCYTES # BLD AUTO: 0.2 K/UL (ref 0.3–1)
MONOCYTES NFR BLD: 9.3 % (ref 4–15)
NEUTROPHILS # BLD AUTO: 1.4 K/UL (ref 1.8–7.7)
NEUTROPHILS NFR BLD: 62.1 % (ref 38–73)
NRBC BLD-RTO: 0 /100 WBC
OSMOLALITY SERPL: 281 MOSM/KG (ref 280–300)
PLATELET # BLD AUTO: 64 K/UL (ref 150–350)
PMV BLD AUTO: ABNORMAL FL (ref 9.2–12.9)
POCT GLUCOSE: 121 MG/DL (ref 70–110)
POCT GLUCOSE: 124 MG/DL (ref 70–110)
POCT GLUCOSE: 132 MG/DL (ref 70–110)
POCT GLUCOSE: 138 MG/DL (ref 70–110)
POCT GLUCOSE: 94 MG/DL (ref 70–110)
POTASSIUM SERPL-SCNC: 4.8 MMOL/L (ref 3.5–5.1)
PROT SERPL-MCNC: 4.4 G/DL (ref 6–8.4)
RBC # BLD AUTO: 3.77 M/UL (ref 4.6–6.2)
RETICS/RBC NFR AUTO: 0.7 % (ref 0.4–2)
SATURATED IRON: 40 % (ref 20–50)
SODIUM SERPL-SCNC: 126 MMOL/L (ref 136–145)
TOTAL IRON BINDING CAPACITY: 121 UG/DL (ref 250–450)
TRANSFERRIN SERPL-MCNC: 82 MG/DL (ref 200–375)
VIT B12 SERPL-MCNC: 1087 PG/ML (ref 210–950)
WBC # BLD AUTO: 2.27 K/UL (ref 3.9–12.7)

## 2021-01-10 PROCEDURE — 99223 1ST HOSP IP/OBS HIGH 75: CPT | Mod: GC,,, | Performed by: INTERNAL MEDICINE

## 2021-01-10 PROCEDURE — 84165 PATHOLOGIST INTERPRETATION SPE: ICD-10-PCS | Mod: 26,,, | Performed by: PATHOLOGY

## 2021-01-10 PROCEDURE — 83930 ASSAY OF BLOOD OSMOLALITY: CPT

## 2021-01-10 PROCEDURE — 82746 ASSAY OF FOLIC ACID SERUM: CPT

## 2021-01-10 PROCEDURE — 82728 ASSAY OF FERRITIN: CPT

## 2021-01-10 PROCEDURE — 86334 PATHOLOGIST INTERPRETATION IFE: ICD-10-PCS | Mod: 26,,, | Performed by: PATHOLOGY

## 2021-01-10 PROCEDURE — 85730 THROMBOPLASTIN TIME PARTIAL: CPT | Mod: 91

## 2021-01-10 PROCEDURE — 84165 PROTEIN E-PHORESIS SERUM: CPT | Mod: 26,,, | Performed by: PATHOLOGY

## 2021-01-10 PROCEDURE — 80053 COMPREHEN METABOLIC PANEL: CPT

## 2021-01-10 PROCEDURE — 84165 PROTEIN E-PHORESIS SERUM: CPT

## 2021-01-10 PROCEDURE — 36415 COLL VENOUS BLD VENIPUNCTURE: CPT

## 2021-01-10 PROCEDURE — 85045 AUTOMATED RETICULOCYTE COUNT: CPT

## 2021-01-10 PROCEDURE — 83540 ASSAY OF IRON: CPT

## 2021-01-10 PROCEDURE — 87046 STOOL CULTR AEROBIC BACT EA: CPT | Mod: 59

## 2021-01-10 PROCEDURE — 99233 PR SUBSEQUENT HOSPITAL CARE,LEVL III: ICD-10-PCS | Mod: GC,,, | Performed by: INTERNAL MEDICINE

## 2021-01-10 PROCEDURE — 99233 SBSQ HOSP IP/OBS HIGH 50: CPT | Mod: GC,,, | Performed by: STUDENT IN AN ORGANIZED HEALTH CARE EDUCATION/TRAINING PROGRAM

## 2021-01-10 PROCEDURE — 87045 FECES CULTURE AEROBIC BACT: CPT

## 2021-01-10 PROCEDURE — 82607 VITAMIN B-12: CPT

## 2021-01-10 PROCEDURE — 86334 IMMUNOFIX E-PHORESIS SERUM: CPT

## 2021-01-10 PROCEDURE — 99233 SBSQ HOSP IP/OBS HIGH 50: CPT | Mod: GC,,, | Performed by: INTERNAL MEDICINE

## 2021-01-10 PROCEDURE — 87427 SHIGA-LIKE TOXIN AG IA: CPT | Mod: 59

## 2021-01-10 PROCEDURE — 87449 NOS EACH ORGANISM AG IA: CPT

## 2021-01-10 PROCEDURE — 99223 PR INITIAL HOSPITAL CARE,LEVL III: ICD-10-PCS | Mod: GC,,, | Performed by: INTERNAL MEDICINE

## 2021-01-10 PROCEDURE — 20600001 HC STEP DOWN PRIVATE ROOM

## 2021-01-10 PROCEDURE — 85025 COMPLETE CBC W/AUTO DIFF WBC: CPT

## 2021-01-10 PROCEDURE — 87324 CLOSTRIDIUM AG IA: CPT

## 2021-01-10 PROCEDURE — 83010 ASSAY OF HAPTOGLOBIN QUANT: CPT

## 2021-01-10 PROCEDURE — 25000003 PHARM REV CODE 250: Performed by: STUDENT IN AN ORGANIZED HEALTH CARE EDUCATION/TRAINING PROGRAM

## 2021-01-10 PROCEDURE — 99233 PR SUBSEQUENT HOSPITAL CARE,LEVL III: ICD-10-PCS | Mod: GC,,, | Performed by: STUDENT IN AN ORGANIZED HEALTH CARE EDUCATION/TRAINING PROGRAM

## 2021-01-10 PROCEDURE — 86334 IMMUNOFIX E-PHORESIS SERUM: CPT | Mod: 26,,, | Performed by: PATHOLOGY

## 2021-01-10 PROCEDURE — 86880 COOMBS TEST DIRECT: CPT

## 2021-01-10 PROCEDURE — 85730 THROMBOPLASTIN TIME PARTIAL: CPT

## 2021-01-10 PROCEDURE — 83735 ASSAY OF MAGNESIUM: CPT

## 2021-01-10 RX ORDER — CLOPIDOGREL BISULFATE 75 MG/1
75 TABLET ORAL DAILY
Status: DISCONTINUED | OUTPATIENT
Start: 2021-01-10 | End: 2021-01-12 | Stop reason: HOSPADM

## 2021-01-10 RX ORDER — LOPERAMIDE HYDROCHLORIDE 2 MG/1
2 CAPSULE ORAL 4 TIMES DAILY PRN
Status: DISCONTINUED | OUTPATIENT
Start: 2021-01-10 | End: 2021-01-12 | Stop reason: HOSPADM

## 2021-01-10 RX ORDER — LEVETIRACETAM 250 MG/1
250 TABLET ORAL 2 TIMES DAILY
Qty: 80 TABLET | Refills: 11 | Status: SHIPPED | OUTPATIENT
Start: 2021-01-10 | End: 2021-03-30 | Stop reason: SDUPTHER

## 2021-01-10 RX ORDER — LEVETIRACETAM 250 MG/1
250 TABLET ORAL
Qty: 12 TABLET | Refills: 11 | Status: SHIPPED | OUTPATIENT
Start: 2021-01-11 | End: 2021-01-10 | Stop reason: CLARIF

## 2021-01-10 RX ORDER — CLOPIDOGREL BISULFATE 75 MG/1
75 TABLET ORAL DAILY
Status: DISCONTINUED | OUTPATIENT
Start: 2021-01-10 | End: 2021-01-10

## 2021-01-10 RX ADMIN — HYDRALAZINE HYDROCHLORIDE 25 MG: 25 TABLET, FILM COATED ORAL at 06:01

## 2021-01-10 RX ADMIN — SEVELAMER CARBONATE 800 MG: 800 TABLET, FILM COATED ORAL at 10:01

## 2021-01-10 RX ADMIN — LOPERAMIDE HYDROCHLORIDE 2 MG: 2 CAPSULE ORAL at 05:01

## 2021-01-10 RX ADMIN — TAMSULOSIN HYDROCHLORIDE 0.8 MG: 0.4 CAPSULE ORAL at 09:01

## 2021-01-10 RX ADMIN — APIXABAN 5 MG: 5 TABLET, FILM COATED ORAL at 09:01

## 2021-01-10 RX ADMIN — CALCITRIOL CAPSULES 0.25 MCG 0.25 MCG: 0.25 CAPSULE ORAL at 09:01

## 2021-01-10 RX ADMIN — HYDRALAZINE HYDROCHLORIDE 25 MG: 25 TABLET, FILM COATED ORAL at 03:01

## 2021-01-10 RX ADMIN — LABETALOL HYDROCHLORIDE 100 MG: 100 TABLET, FILM COATED ORAL at 09:01

## 2021-01-10 RX ADMIN — ATORVASTATIN CALCIUM 80 MG: 20 TABLET, FILM COATED ORAL at 09:01

## 2021-01-10 RX ADMIN — LEVETIRACETAM 250 MG: 250 TABLET ORAL at 09:01

## 2021-01-10 RX ADMIN — SEVELAMER CARBONATE 800 MG: 800 TABLET, FILM COATED ORAL at 06:01

## 2021-01-10 RX ADMIN — CLOPIDOGREL 75 MG: 75 TABLET, FILM COATED ORAL at 03:01

## 2021-01-10 RX ADMIN — SEVELAMER CARBONATE 800 MG: 800 TABLET, FILM COATED ORAL at 12:01

## 2021-01-10 RX ADMIN — AMLODIPINE BESYLATE 10 MG: 10 TABLET ORAL at 09:01

## 2021-01-10 RX ADMIN — HYDRALAZINE HYDROCHLORIDE 25 MG: 25 TABLET, FILM COATED ORAL at 09:01

## 2021-01-10 RX ADMIN — LEVETIRACETAM 250 MG: 250 TABLET ORAL at 12:01

## 2021-01-11 LAB
ACANTHOCYTES BLD QL SMEAR: ABNORMAL
ALBUMIN SERPL BCP-MCNC: 1.5 G/DL (ref 3.5–5.2)
ALBUMIN SERPL ELPH-MCNC: 1.87 G/DL (ref 3.35–5.55)
ALP SERPL-CCNC: 226 U/L (ref 55–135)
ALPHA1 GLOB SERPL ELPH-MCNC: 0.41 G/DL (ref 0.17–0.41)
ALPHA2 GLOB SERPL ELPH-MCNC: 0.64 G/DL (ref 0.43–0.99)
ALT SERPL W/O P-5'-P-CCNC: 123 U/L (ref 10–44)
ANA SER QL IF: NORMAL
ANION GAP SERPL CALC-SCNC: 14 MMOL/L (ref 8–16)
ANISOCYTOSIS BLD QL SMEAR: ABNORMAL
AST SERPL-CCNC: 222 U/L (ref 10–40)
AUER BODIES BLD QL SMEAR: ABNORMAL
B-GLOBULIN SERPL ELPH-MCNC: 0.4 G/DL (ref 0.5–1.1)
BASO STIPL BLD QL SMEAR: ABNORMAL
BASOPHILS # BLD AUTO: ABNORMAL K/UL (ref 0–0.2)
BASOPHILS NFR BLD: ABNORMAL % (ref 0–1.9)
BILIRUB SERPL-MCNC: 0.6 MG/DL (ref 0.1–1)
BLASTS NFR BLD MANUAL: ABNORMAL %
BUN SERPL-MCNC: 52 MG/DL (ref 8–23)
BURR CELLS BLD QL SMEAR: ABNORMAL
CABOT RINGS BLD QL SMEAR: ABNORMAL
CALCIUM SERPL-MCNC: 6.4 MG/DL (ref 8.7–10.5)
CHLORIDE SERPL-SCNC: 97 MMOL/L (ref 95–110)
CO2 SERPL-SCNC: 18 MMOL/L (ref 23–29)
CREAT SERPL-MCNC: 8.3 MG/DL (ref 0.5–1.4)
DACRYOCYTES BLD QL SMEAR: ABNORMAL
DIFFERENTIAL METHOD: ABNORMAL
DOHLE BOD BLD QL SMEAR: ABNORMAL
EOSINOPHIL # BLD AUTO: ABNORMAL K/UL (ref 0–0.5)
EOSINOPHIL NFR BLD: ABNORMAL % (ref 0–8)
ERYTHROCYTE [DISTWIDTH] IN BLOOD BY AUTOMATED COUNT: ABNORMAL % (ref 11.5–14.5)
EST. GFR  (AFRICAN AMERICAN): 7 ML/MIN/1.73 M^2
EST. GFR  (NON AFRICAN AMERICAN): 6 ML/MIN/1.73 M^2
GAMMA GLOB SERPL ELPH-MCNC: 0.77 G/DL (ref 0.67–1.58)
GIANT PLATELETS BLD QL SMEAR: ABNORMAL
GLUCOSE SERPL-MCNC: 84 MG/DL (ref 70–110)
HBV CORE AB SERPL QL IA: NEGATIVE
HCT VFR BLD AUTO: ABNORMAL % (ref 40–54)
HEINZ BOD BLD QL SMEAR: ABNORMAL
HGB BLD-MCNC: ABNORMAL G/DL (ref 14–18)
HGB C CRY RBC QL MICRO: ABNORMAL
HOWELL-JOLLY BOD BLD QL SMEAR: ABNORMAL
HYPOCHROMIA BLD QL SMEAR: ABNORMAL
IMM GRANULOCYTES # BLD AUTO: ABNORMAL K/UL (ref 0–0.04)
IMM GRANULOCYTES NFR BLD AUTO: ABNORMAL % (ref 0–0.5)
INTERPRETATION SERPL IFE-IMP: NORMAL
LYMPHOCYTES # BLD AUTO: ABNORMAL K/UL (ref 1–4.8)
LYMPHOCYTES NFR BLD: ABNORMAL % (ref 18–48)
MAGNESIUM SERPL-MCNC: 2.1 MG/DL (ref 1.6–2.6)
MCH RBC QN AUTO: ABNORMAL PG (ref 27–31)
MCHC RBC AUTO-ENTMCNC: ABNORMAL G/DL (ref 32–36)
MCV RBC AUTO: ABNORMAL FL (ref 82–98)
MEGAKARYOCYTIC FRAGMENTS: ABNORMAL
METAMYELOCYTES NFR BLD MANUAL: ABNORMAL %
MONOCYTES # BLD AUTO: ABNORMAL K/UL (ref 0.3–1)
MONOCYTES NFR BLD: ABNORMAL % (ref 4–15)
MYELOCYTES NFR BLD MANUAL: ABNORMAL %
NEUTROPHILS # BLD AUTO: ABNORMAL K/UL (ref 1.8–7.7)
NEUTROPHILS NFR BLD: ABNORMAL % (ref 38–73)
NEUTS BAND NFR BLD MANUAL: ABNORMAL %
NRBC BLD-RTO: ABNORMAL /100 WBC
OVALOCYTES BLD QL SMEAR: ABNORMAL
PAPPENHEIMER BOD BLD QL SMEAR: ABNORMAL
PATHOLOGIST INTERPRETATION IFE: NORMAL
PATHOLOGIST INTERPRETATION SPE: NORMAL
PHOSPHATE SERPL-MCNC: 5.1 MG/DL (ref 2.7–4.5)
PLASMODIUM BLD QL SMEAR: ABNORMAL
PLATELET # BLD AUTO: ABNORMAL K/UL (ref 150–350)
PLATELET BLD QL SMEAR: ABNORMAL
PMV BLD AUTO: ABNORMAL FL (ref 9.2–12.9)
POCT GLUCOSE: 142 MG/DL (ref 70–110)
POCT GLUCOSE: 176 MG/DL (ref 70–110)
POIKILOCYTOSIS BLD QL SMEAR: ABNORMAL
POLYCHROMASIA BLD QL SMEAR: ABNORMAL
POTASSIUM SERPL-SCNC: 5.5 MMOL/L (ref 3.5–5.1)
PROMYELOCYTES NFR BLD MANUAL: ABNORMAL %
PROT SERPL-MCNC: 4.1 G/DL (ref 6–8.4)
PROT SERPL-MCNC: 4.3 G/DL (ref 6–8.4)
RBC # BLD AUTO: ABNORMAL M/UL (ref 4.6–6.2)
RBC AGGLUT BLD QL: ABNORMAL
ROULEAUX BLD QL SMEAR: ABNORMAL
SCHISTOCYTES BLD QL SMEAR: ABNORMAL
SCHISTOCYTES BLD QL SMEAR: ABNORMAL
SICKLE CELLS BLD QL SMEAR: ABNORMAL
SMUDGE CELLS BLD QL SMEAR: ABNORMAL
SODIUM SERPL-SCNC: 129 MMOL/L (ref 136–145)
SPHEROCYTES BLD QL SMEAR: ABNORMAL
STOMATOCYTES BLD QL SMEAR: ABNORMAL
TARGETS BLD QL SMEAR: ABNORMAL
TOXIC GRANULES BLD QL SMEAR: ABNORMAL
WBC # BLD AUTO: ABNORMAL K/UL (ref 3.9–12.7)
WBC NRBC COR # BLD: ABNORMAL K/UL (ref 3.9–12.7)
WBC OTHER NFR BLD MANUAL: ABNORMAL %
WBC TOXIC VACUOLES BLD QL SMEAR: ABNORMAL

## 2021-01-11 PROCEDURE — 20600001 HC STEP DOWN PRIVATE ROOM

## 2021-01-11 PROCEDURE — 90935 PR HEMODIALYSIS, ONE EVALUATION: ICD-10-PCS | Mod: ,,, | Performed by: INTERNAL MEDICINE

## 2021-01-11 PROCEDURE — 99239 PR HOSPITAL DISCHARGE DAY,>30 MIN: ICD-10-PCS | Mod: GC,,, | Performed by: STUDENT IN AN ORGANIZED HEALTH CARE EDUCATION/TRAINING PROGRAM

## 2021-01-11 PROCEDURE — 80053 COMPREHEN METABOLIC PANEL: CPT

## 2021-01-11 PROCEDURE — 83735 ASSAY OF MAGNESIUM: CPT

## 2021-01-11 PROCEDURE — 90935 HEMODIALYSIS ONE EVALUATION: CPT | Mod: ,,, | Performed by: INTERNAL MEDICINE

## 2021-01-11 PROCEDURE — 25000003 PHARM REV CODE 250: Performed by: STUDENT IN AN ORGANIZED HEALTH CARE EDUCATION/TRAINING PROGRAM

## 2021-01-11 PROCEDURE — 84100 ASSAY OF PHOSPHORUS: CPT

## 2021-01-11 PROCEDURE — 80100016 HC MAINTENANCE HEMODIALYSIS

## 2021-01-11 PROCEDURE — 25000003 PHARM REV CODE 250: Performed by: NURSE PRACTITIONER

## 2021-01-11 PROCEDURE — 99239 HOSP IP/OBS DSCHRG MGMT >30: CPT | Mod: GC,,, | Performed by: STUDENT IN AN ORGANIZED HEALTH CARE EDUCATION/TRAINING PROGRAM

## 2021-01-11 PROCEDURE — 63600175 PHARM REV CODE 636 W HCPCS: Performed by: STUDENT IN AN ORGANIZED HEALTH CARE EDUCATION/TRAINING PROGRAM

## 2021-01-11 PROCEDURE — 36415 COLL VENOUS BLD VENIPUNCTURE: CPT

## 2021-01-11 RX ADMIN — BENZONATATE 100 MG: 100 CAPSULE ORAL at 01:01

## 2021-01-11 RX ADMIN — HYDRALAZINE HYDROCHLORIDE 25 MG: 25 TABLET, FILM COATED ORAL at 05:01

## 2021-01-11 RX ADMIN — HYDRALAZINE HYDROCHLORIDE 25 MG: 25 TABLET, FILM COATED ORAL at 03:01

## 2021-01-11 RX ADMIN — APIXABAN 5 MG: 5 TABLET, FILM COATED ORAL at 09:01

## 2021-01-11 RX ADMIN — LABETALOL HYDROCHLORIDE 100 MG: 100 TABLET, FILM COATED ORAL at 01:01

## 2021-01-11 RX ADMIN — CALCITRIOL CAPSULES 0.25 MCG 0.25 MCG: 0.25 CAPSULE ORAL at 01:01

## 2021-01-11 RX ADMIN — APIXABAN 5 MG: 5 TABLET, FILM COATED ORAL at 01:01

## 2021-01-11 RX ADMIN — LEVETIRACETAM 250 MG: 250 TABLET ORAL at 09:01

## 2021-01-11 RX ADMIN — LEVETIRACETAM 250 MG: 250 TABLET ORAL at 01:01

## 2021-01-11 RX ADMIN — TAMSULOSIN HYDROCHLORIDE 0.8 MG: 0.4 CAPSULE ORAL at 01:01

## 2021-01-11 RX ADMIN — SODIUM CHLORIDE 100 ML/HR: 0.9 INJECTION, SOLUTION INTRAVENOUS at 07:01

## 2021-01-11 RX ADMIN — GENTAMICIN SULFATE 80 MG: 40 INJECTION, SOLUTION INTRAMUSCULAR; INTRAVENOUS at 11:01

## 2021-01-11 RX ADMIN — SEVELAMER CARBONATE 800 MG: 800 TABLET, FILM COATED ORAL at 01:01

## 2021-01-11 RX ADMIN — CLOPIDOGREL 75 MG: 75 TABLET, FILM COATED ORAL at 01:01

## 2021-01-11 RX ADMIN — LABETALOL HYDROCHLORIDE 100 MG: 100 TABLET, FILM COATED ORAL at 09:01

## 2021-01-11 RX ADMIN — AMLODIPINE BESYLATE 10 MG: 10 TABLET ORAL at 01:01

## 2021-01-11 RX ADMIN — EPOETIN ALFA-EPBX 4300 UNITS: 10000 INJECTION, SOLUTION INTRAVENOUS; SUBCUTANEOUS at 11:01

## 2021-01-11 RX ADMIN — ATORVASTATIN CALCIUM 80 MG: 20 TABLET, FILM COATED ORAL at 01:01

## 2021-01-11 RX ADMIN — HYDRALAZINE HYDROCHLORIDE 25 MG: 25 TABLET, FILM COATED ORAL at 09:01

## 2021-01-11 RX ADMIN — PARICALCITOL 8 MCG: 5 INJECTION, SOLUTION INTRAVENOUS at 11:01

## 2021-01-12 ENCOUNTER — TELEPHONE (OUTPATIENT)
Dept: HEMATOLOGY/ONCOLOGY | Facility: HOSPITAL | Age: 66
End: 2021-01-12

## 2021-01-12 ENCOUNTER — TELEPHONE (OUTPATIENT)
Dept: HEMATOLOGY/ONCOLOGY | Facility: CLINIC | Age: 66
End: 2021-01-12

## 2021-01-12 VITALS
HEART RATE: 81 BPM | HEIGHT: 68 IN | SYSTOLIC BLOOD PRESSURE: 127 MMHG | TEMPERATURE: 98 F | DIASTOLIC BLOOD PRESSURE: 67 MMHG | OXYGEN SATURATION: 95 % | BODY MASS INDEX: 29.23 KG/M2 | WEIGHT: 192.88 LBS | RESPIRATION RATE: 20 BRPM

## 2021-01-12 DIAGNOSIS — D61.818 PANCYTOPENIA: Primary | ICD-10-CM

## 2021-01-12 LAB
ALBUMIN SERPL BCP-MCNC: 1.5 G/DL (ref 3.5–5.2)
ALP SERPL-CCNC: 230 U/L (ref 55–135)
ALT SERPL W/O P-5'-P-CCNC: 120 U/L (ref 10–44)
ANION GAP SERPL CALC-SCNC: 10 MMOL/L (ref 8–16)
AST SERPL-CCNC: 206 U/L (ref 10–40)
BASOPHILS # BLD AUTO: 0.02 K/UL (ref 0–0.2)
BASOPHILS NFR BLD: 0.9 % (ref 0–1.9)
BILIRUB SERPL-MCNC: 0.6 MG/DL (ref 0.1–1)
BUN SERPL-MCNC: 30 MG/DL (ref 8–23)
CALCIUM SERPL-MCNC: 6.7 MG/DL (ref 8.7–10.5)
CHLORIDE SERPL-SCNC: 96 MMOL/L (ref 95–110)
CO2 SERPL-SCNC: 24 MMOL/L (ref 23–29)
CREAT SERPL-MCNC: 6 MG/DL (ref 0.5–1.4)
DIFFERENTIAL METHOD: ABNORMAL
E COLI SXT1 STL QL IA: NEGATIVE
E COLI SXT2 STL QL IA: NEGATIVE
EOSINOPHIL # BLD AUTO: 0 K/UL (ref 0–0.5)
EOSINOPHIL NFR BLD: 0.5 % (ref 0–8)
ERYTHROCYTE [DISTWIDTH] IN BLOOD BY AUTOMATED COUNT: 16 % (ref 11.5–14.5)
EST. GFR  (AFRICAN AMERICAN): 10.3 ML/MIN/1.73 M^2
EST. GFR  (NON AFRICAN AMERICAN): 8.9 ML/MIN/1.73 M^2
GLUCOSE SERPL-MCNC: 98 MG/DL (ref 70–110)
HCT VFR BLD AUTO: 26.5 % (ref 40–54)
HGB BLD-MCNC: 9.1 G/DL (ref 14–18)
IMM GRANULOCYTES # BLD AUTO: 0.02 K/UL (ref 0–0.04)
IMM GRANULOCYTES NFR BLD AUTO: 0.9 % (ref 0–0.5)
LYMPHOCYTES # BLD AUTO: 0.6 K/UL (ref 1–4.8)
LYMPHOCYTES NFR BLD: 25.8 % (ref 18–48)
MAGNESIUM SERPL-MCNC: 1.9 MG/DL (ref 1.6–2.6)
MCH RBC QN AUTO: 23.9 PG (ref 27–31)
MCHC RBC AUTO-ENTMCNC: 34.3 G/DL (ref 32–36)
MCV RBC AUTO: 70 FL (ref 82–98)
MONOCYTES # BLD AUTO: 0.3 K/UL (ref 0.3–1)
MONOCYTES NFR BLD: 11.3 % (ref 4–15)
NEUTROPHILS # BLD AUTO: 1.3 K/UL (ref 1.8–7.7)
NEUTROPHILS NFR BLD: 60.6 % (ref 38–73)
NRBC BLD-RTO: 0 /100 WBC
PLATELET # BLD AUTO: 64 K/UL (ref 150–350)
PMV BLD AUTO: ABNORMAL FL (ref 9.2–12.9)
POCT GLUCOSE: 106 MG/DL (ref 70–110)
POCT GLUCOSE: 123 MG/DL (ref 70–110)
POTASSIUM SERPL-SCNC: 4.2 MMOL/L (ref 3.5–5.1)
PROT SERPL-MCNC: 4.3 G/DL (ref 6–8.4)
RBC # BLD AUTO: 3.8 M/UL (ref 4.6–6.2)
SODIUM SERPL-SCNC: 130 MMOL/L (ref 136–145)
WBC # BLD AUTO: 2.21 K/UL (ref 3.9–12.7)

## 2021-01-12 PROCEDURE — 25000003 PHARM REV CODE 250: Performed by: STUDENT IN AN ORGANIZED HEALTH CARE EDUCATION/TRAINING PROGRAM

## 2021-01-12 PROCEDURE — 85025 COMPLETE CBC W/AUTO DIFF WBC: CPT

## 2021-01-12 PROCEDURE — 36415 COLL VENOUS BLD VENIPUNCTURE: CPT

## 2021-01-12 PROCEDURE — 83735 ASSAY OF MAGNESIUM: CPT

## 2021-01-12 PROCEDURE — 99239 HOSP IP/OBS DSCHRG MGMT >30: CPT | Mod: GC,,, | Performed by: STUDENT IN AN ORGANIZED HEALTH CARE EDUCATION/TRAINING PROGRAM

## 2021-01-12 PROCEDURE — 99239 PR HOSPITAL DISCHARGE DAY,>30 MIN: ICD-10-PCS | Mod: GC,,, | Performed by: STUDENT IN AN ORGANIZED HEALTH CARE EDUCATION/TRAINING PROGRAM

## 2021-01-12 PROCEDURE — 80053 COMPREHEN METABOLIC PANEL: CPT

## 2021-01-12 RX ORDER — INSULIN ASPART 100 [IU]/ML
INJECTION, SOLUTION INTRAVENOUS; SUBCUTANEOUS
Qty: 1 BOX | Refills: 2 | Status: SHIPPED | OUTPATIENT
Start: 2021-01-12 | End: 2021-03-30 | Stop reason: ALTCHOICE

## 2021-01-12 RX ORDER — SODIUM CHLORIDE 9 MG/ML
INJECTION, SOLUTION INTRAVENOUS ONCE
Status: DISCONTINUED | OUTPATIENT
Start: 2021-01-13 | End: 2021-01-12 | Stop reason: HOSPADM

## 2021-01-12 RX ADMIN — LOPERAMIDE HYDROCHLORIDE 2 MG: 2 CAPSULE ORAL at 08:01

## 2021-01-12 RX ADMIN — LEVETIRACETAM 250 MG: 250 TABLET ORAL at 08:01

## 2021-01-12 RX ADMIN — TAMSULOSIN HYDROCHLORIDE 0.8 MG: 0.4 CAPSULE ORAL at 08:01

## 2021-01-12 RX ADMIN — SEVELAMER CARBONATE 800 MG: 800 TABLET, FILM COATED ORAL at 07:01

## 2021-01-12 RX ADMIN — CLOPIDOGREL 75 MG: 75 TABLET, FILM COATED ORAL at 08:01

## 2021-01-12 RX ADMIN — HYDRALAZINE HYDROCHLORIDE 25 MG: 25 TABLET, FILM COATED ORAL at 06:01

## 2021-01-12 RX ADMIN — APIXABAN 5 MG: 5 TABLET, FILM COATED ORAL at 08:01

## 2021-01-12 RX ADMIN — LABETALOL HYDROCHLORIDE 100 MG: 100 TABLET, FILM COATED ORAL at 08:01

## 2021-01-12 RX ADMIN — ATORVASTATIN CALCIUM 80 MG: 20 TABLET, FILM COATED ORAL at 08:01

## 2021-01-12 RX ADMIN — CALCITRIOL CAPSULES 0.25 MCG 0.25 MCG: 0.25 CAPSULE ORAL at 08:01

## 2021-01-12 RX ADMIN — AMLODIPINE BESYLATE 10 MG: 10 TABLET ORAL at 08:01

## 2021-01-13 ENCOUNTER — PATIENT OUTREACH (OUTPATIENT)
Dept: ADMINISTRATIVE | Facility: CLINIC | Age: 66
End: 2021-01-13

## 2021-01-13 DIAGNOSIS — E11.40 TYPE 2 DIABETES MELLITUS WITH DIABETIC NEUROPATHY, WITH LONG-TERM CURRENT USE OF INSULIN: Primary | ICD-10-CM

## 2021-01-13 DIAGNOSIS — Z79.4 TYPE 2 DIABETES MELLITUS WITH DIABETIC NEUROPATHY, WITH LONG-TERM CURRENT USE OF INSULIN: Primary | ICD-10-CM

## 2021-01-13 LAB — BACTERIA STL CULT: NORMAL

## 2021-01-13 RX ORDER — INSULIN PUMP SYRINGE, 3 ML
EACH MISCELLANEOUS
Qty: 1 EACH | Refills: 0 | Status: SHIPPED | OUTPATIENT
Start: 2021-01-13 | End: 2021-03-30

## 2021-01-15 ENCOUNTER — TELEPHONE (OUTPATIENT)
Dept: FAMILY MEDICINE | Facility: CLINIC | Age: 66
End: 2021-01-15

## 2021-01-15 DIAGNOSIS — I69.351 HEMIPLEGIA AND HEMIPARESIS FOLLOWING CEREBRAL INFARCTION AFFECTING RIGHT DOMINANT SIDE: Primary | Chronic | ICD-10-CM

## 2021-01-15 PROCEDURE — G0179 MD RECERTIFICATION HHA PT: HCPCS | Mod: ,,, | Performed by: INTERNAL MEDICINE

## 2021-01-15 PROCEDURE — G0179 PR HOME HEALTH MD RECERTIFICATION: ICD-10-PCS | Mod: ,,, | Performed by: INTERNAL MEDICINE

## 2021-01-20 PROBLEM — R50.9 FUO (FEVER OF UNKNOWN ORIGIN): Status: RESOLVED | Noted: 2021-01-08 | Resolved: 2021-01-20

## 2021-01-20 PROBLEM — G93.41 ENCEPHALOPATHY, METABOLIC: Status: RESOLVED | Noted: 2021-01-04 | Resolved: 2021-01-20

## 2021-01-20 PROBLEM — E16.2 HYPOGLYCEMIA: Status: RESOLVED | Noted: 2021-01-04 | Resolved: 2021-01-20

## 2021-01-20 PROBLEM — Z86.718 HISTORY OF DEEP VEIN THROMBOSIS (DVT) OF LOWER EXTREMITY: Status: RESOLVED | Noted: 2021-01-06 | Resolved: 2021-01-20

## 2021-01-20 PROBLEM — N40.0 ENLARGED PROSTATE: Status: RESOLVED | Noted: 2020-08-16 | Resolved: 2021-01-20

## 2021-01-20 PROBLEM — Z74.09 IMPAIRED FUNCTIONAL MOBILITY, BALANCE, GAIT, AND ENDURANCE: Status: RESOLVED | Noted: 2017-06-23 | Resolved: 2021-01-20

## 2021-01-20 PROBLEM — A41.9 SEPSIS: Status: RESOLVED | Noted: 2021-01-04 | Resolved: 2021-01-20

## 2021-01-20 PROBLEM — R53.1 WEAKNESS: Status: RESOLVED | Noted: 2021-01-05 | Resolved: 2021-01-20

## 2021-01-20 PROBLEM — Z98.890 POST-OPERATIVE STATE: Status: RESOLVED | Noted: 2017-10-06 | Resolved: 2021-01-20

## 2021-01-20 PROBLEM — D64.9 ANEMIA: Status: RESOLVED | Noted: 2020-08-16 | Resolved: 2021-01-20

## 2021-02-01 ENCOUNTER — TELEPHONE (OUTPATIENT)
Dept: HEMATOLOGY/ONCOLOGY | Facility: CLINIC | Age: 66
End: 2021-02-01

## 2021-02-02 ENCOUNTER — OFFICE VISIT (OUTPATIENT)
Dept: UROLOGY | Facility: CLINIC | Age: 66
End: 2021-02-02
Payer: MEDICARE

## 2021-02-02 ENCOUNTER — TELEPHONE (OUTPATIENT)
Dept: HEMATOLOGY/ONCOLOGY | Facility: CLINIC | Age: 66
End: 2021-02-02

## 2021-02-02 VITALS — BODY MASS INDEX: 29.23 KG/M2 | HEIGHT: 68 IN | WEIGHT: 192.88 LBS

## 2021-02-02 DIAGNOSIS — R97.20 ELEVATED PSA: ICD-10-CM

## 2021-02-02 DIAGNOSIS — R31.0 GROSS HEMATURIA: Primary | ICD-10-CM

## 2021-02-02 DIAGNOSIS — R35.1 NOCTURIA MORE THAN TWICE PER NIGHT: ICD-10-CM

## 2021-02-02 DIAGNOSIS — N40.1 BPH WITH OBSTRUCTION/LOWER URINARY TRACT SYMPTOMS: ICD-10-CM

## 2021-02-02 DIAGNOSIS — N13.8 BPH WITH OBSTRUCTION/LOWER URINARY TRACT SYMPTOMS: ICD-10-CM

## 2021-02-02 PROCEDURE — 99214 OFFICE O/P EST MOD 30 MIN: CPT | Mod: PBBFAC | Performed by: UROLOGY

## 2021-02-02 PROCEDURE — 99999 PR PBB SHADOW E&M-EST. PATIENT-LVL IV: CPT | Mod: PBBFAC,,, | Performed by: UROLOGY

## 2021-02-02 PROCEDURE — 99204 OFFICE O/P NEW MOD 45 MIN: CPT | Mod: S$PBB,,, | Performed by: UROLOGY

## 2021-02-02 PROCEDURE — 99204 PR OFFICE/OUTPT VISIT, NEW, LEVL IV, 45-59 MIN: ICD-10-PCS | Mod: S$PBB,,, | Performed by: UROLOGY

## 2021-02-02 PROCEDURE — 99999 PR PBB SHADOW E&M-EST. PATIENT-LVL IV: ICD-10-PCS | Mod: PBBFAC,,, | Performed by: UROLOGY

## 2021-02-02 RX ORDER — CIPROFLOXACIN 500 MG/1
500 TABLET ORAL 2 TIMES DAILY
Qty: 6 TABLET | Refills: 0 | Status: SHIPPED | OUTPATIENT
Start: 2021-02-02 | End: 2021-02-05

## 2021-02-03 ENCOUNTER — TELEPHONE (OUTPATIENT)
Dept: HEMATOLOGY/ONCOLOGY | Facility: CLINIC | Age: 66
End: 2021-02-03

## 2021-02-04 ENCOUNTER — DOCUMENT SCAN (OUTPATIENT)
Dept: HOME HEALTH SERVICES | Facility: HOSPITAL | Age: 66
End: 2021-02-04
Payer: MEDICARE

## 2021-02-10 ENCOUNTER — LAB VISIT (OUTPATIENT)
Dept: LAB | Facility: OTHER | Age: 66
End: 2021-02-10
Payer: MEDICARE

## 2021-02-10 DIAGNOSIS — Z20.822 ENCOUNTER FOR LABORATORY TESTING FOR COVID-19 VIRUS: ICD-10-CM

## 2021-02-10 PROCEDURE — U0003 INFECTIOUS AGENT DETECTION BY NUCLEIC ACID (DNA OR RNA); SEVERE ACUTE RESPIRATORY SYNDROME CORONAVIRUS 2 (SARS-COV-2) (CORONAVIRUS DISEASE [COVID-19]), AMPLIFIED PROBE TECHNIQUE, MAKING USE OF HIGH THROUGHPUT TECHNOLOGIES AS DESCRIBED BY CMS-2020-01-R: HCPCS

## 2021-02-11 ENCOUNTER — PATIENT OUTREACH (OUTPATIENT)
Dept: HOME HEALTH SERVICES | Facility: HOSPITAL | Age: 66
End: 2021-02-11

## 2021-02-11 ENCOUNTER — HOSPITAL ENCOUNTER (OUTPATIENT)
Dept: PREADMISSION TESTING | Facility: HOSPITAL | Age: 66
Discharge: HOME | End: 2021-02-11
Attending: UROLOGY
Payer: MEDICARE

## 2021-02-11 VITALS
HEIGHT: 68 IN | OXYGEN SATURATION: 98 % | RESPIRATION RATE: 17 BRPM | BODY MASS INDEX: 28.79 KG/M2 | TEMPERATURE: 99 F | DIASTOLIC BLOOD PRESSURE: 78 MMHG | WEIGHT: 190 LBS | HEART RATE: 72 BPM | SYSTOLIC BLOOD PRESSURE: 145 MMHG

## 2021-02-11 DIAGNOSIS — R97.20 ELEVATED PSA: ICD-10-CM

## 2021-02-11 DIAGNOSIS — R31.0 GROSS HEMATURIA: ICD-10-CM

## 2021-02-11 LAB
ANION GAP SERPL CALC-SCNC: 9 MMOL/L (ref 8–16)
BASOPHILS # BLD AUTO: 0.03 K/UL (ref 0–0.2)
BASOPHILS NFR BLD: 1.1 % (ref 0–1.9)
BUN SERPL-MCNC: 25 MG/DL (ref 8–23)
CALCIUM SERPL-MCNC: 7.8 MG/DL (ref 8.7–10.5)
CHLORIDE SERPL-SCNC: 96 MMOL/L (ref 95–110)
CO2 SERPL-SCNC: 26 MMOL/L (ref 23–29)
CREAT SERPL-MCNC: 5 MG/DL (ref 0.5–1.4)
DIFFERENTIAL METHOD: ABNORMAL
EOSINOPHIL # BLD AUTO: 0.1 K/UL (ref 0–0.5)
EOSINOPHIL NFR BLD: 2.6 % (ref 0–8)
ERYTHROCYTE [DISTWIDTH] IN BLOOD BY AUTOMATED COUNT: 18.1 % (ref 11.5–14.5)
EST. GFR  (AFRICAN AMERICAN): 13 ML/MIN/1.73 M^2
EST. GFR  (NON AFRICAN AMERICAN): 11 ML/MIN/1.73 M^2
GLUCOSE SERPL-MCNC: 107 MG/DL (ref 70–110)
HCT VFR BLD AUTO: 26.3 % (ref 40–54)
HGB BLD-MCNC: 8.8 G/DL (ref 14–18)
IMM GRANULOCYTES # BLD AUTO: 0.01 K/UL (ref 0–0.04)
IMM GRANULOCYTES NFR BLD AUTO: 0.4 % (ref 0–0.5)
LYMPHOCYTES # BLD AUTO: 0.5 K/UL (ref 1–4.8)
LYMPHOCYTES NFR BLD: 18.8 % (ref 18–48)
MCH RBC QN AUTO: 24.4 PG (ref 27–31)
MCHC RBC AUTO-ENTMCNC: 33.5 G/DL (ref 32–36)
MCV RBC AUTO: 73 FL (ref 82–98)
MONOCYTES # BLD AUTO: 0.5 K/UL (ref 0.3–1)
MONOCYTES NFR BLD: 18.8 % (ref 4–15)
NEUTROPHILS # BLD AUTO: 1.6 K/UL (ref 1.8–7.7)
NEUTROPHILS NFR BLD: 58.3 % (ref 38–73)
NRBC BLD-RTO: 0 /100 WBC
PLATELET # BLD AUTO: 120 K/UL (ref 150–350)
PMV BLD AUTO: 9.1 FL (ref 9.2–12.9)
POTASSIUM SERPL-SCNC: 4.6 MMOL/L (ref 3.5–5.1)
RBC # BLD AUTO: 3.61 M/UL (ref 4.6–6.2)
SARS-COV-2 RNA RESP QL NAA+PROBE: NOT DETECTED
SODIUM SERPL-SCNC: 131 MMOL/L (ref 136–145)
WBC # BLD AUTO: 2.66 K/UL (ref 3.9–12.7)

## 2021-02-11 PROCEDURE — 85025 COMPLETE CBC W/AUTO DIFF WBC: CPT

## 2021-02-11 PROCEDURE — 80048 BASIC METABOLIC PNL TOTAL CA: CPT

## 2021-02-17 ENCOUNTER — EXTERNAL HOME HEALTH (OUTPATIENT)
Dept: HOME HEALTH SERVICES | Facility: HOSPITAL | Age: 66
End: 2021-02-17
Payer: MEDICARE

## 2021-02-17 ENCOUNTER — HOSPITAL ENCOUNTER (OUTPATIENT)
Dept: PREADMISSION TESTING | Facility: HOSPITAL | Age: 66
Discharge: HOME | End: 2021-02-17
Attending: UROLOGY
Payer: MEDICARE

## 2021-02-17 ENCOUNTER — ANESTHESIA EVENT (OUTPATIENT)
Dept: SURGERY | Facility: HOSPITAL | Age: 66
End: 2021-02-17
Payer: MEDICARE

## 2021-02-17 DIAGNOSIS — Z01.812 ENCOUNTER FOR PREOPERATIVE SCREENING LABORATORY TESTING FOR COVID-19 VIRUS: ICD-10-CM

## 2021-02-17 DIAGNOSIS — Z11.52 ENCOUNTER FOR PREOPERATIVE SCREENING LABORATORY TESTING FOR COVID-19 VIRUS: ICD-10-CM

## 2021-02-17 DIAGNOSIS — Z20.822 ENCOUNTER FOR LABORATORY TESTING FOR COVID-19 VIRUS: ICD-10-CM

## 2021-02-17 LAB — SARS-COV-2 RDRP RESP QL NAA+PROBE: NEGATIVE

## 2021-02-17 PROCEDURE — U0002 COVID-19 LAB TEST NON-CDC: HCPCS

## 2021-02-18 ENCOUNTER — ANESTHESIA (OUTPATIENT)
Dept: SURGERY | Facility: HOSPITAL | Age: 66
End: 2021-02-18
Payer: MEDICARE

## 2021-02-18 ENCOUNTER — HOSPITAL ENCOUNTER (OUTPATIENT)
Facility: HOSPITAL | Age: 66
Discharge: NURSING, REHAB OR LONG-TERM CARE | End: 2021-02-18
Attending: UROLOGY | Admitting: UROLOGY
Payer: MEDICARE

## 2021-02-18 ENCOUNTER — TELEPHONE (OUTPATIENT)
Dept: UROLOGY | Facility: CLINIC | Age: 66
End: 2021-02-18

## 2021-02-18 VITALS
TEMPERATURE: 99 F | BODY MASS INDEX: 28.86 KG/M2 | DIASTOLIC BLOOD PRESSURE: 84 MMHG | HEART RATE: 75 BPM | SYSTOLIC BLOOD PRESSURE: 168 MMHG | WEIGHT: 189.81 LBS | OXYGEN SATURATION: 98 % | RESPIRATION RATE: 18 BRPM

## 2021-02-18 DIAGNOSIS — Z01.812 ENCOUNTER FOR PREOPERATIVE SCREENING LABORATORY TESTING FOR COVID-19 VIRUS: ICD-10-CM

## 2021-02-18 DIAGNOSIS — E11.9 DM TYPE 2 WITHOUT RETINOPATHY: ICD-10-CM

## 2021-02-18 DIAGNOSIS — Z11.52 ENCOUNTER FOR PREOPERATIVE SCREENING LABORATORY TESTING FOR COVID-19 VIRUS: ICD-10-CM

## 2021-02-18 DIAGNOSIS — R31.0 GROSS HEMATURIA: Primary | ICD-10-CM

## 2021-02-18 DIAGNOSIS — R97.20 ELEVATED PSA: ICD-10-CM

## 2021-02-18 LAB — POCT GLUCOSE: 115 MG/DL (ref 70–110)

## 2021-02-18 PROCEDURE — 52000 CYSTOURETHROSCOPY: CPT | Mod: 59,,, | Performed by: UROLOGY

## 2021-02-18 PROCEDURE — 63600175 PHARM REV CODE 636 W HCPCS: Performed by: STUDENT IN AN ORGANIZED HEALTH CARE EDUCATION/TRAINING PROGRAM

## 2021-02-18 PROCEDURE — D9220A PRA ANESTHESIA: Mod: CRNA,,, | Performed by: NURSE ANESTHETIST, CERTIFIED REGISTERED

## 2021-02-18 PROCEDURE — 71000016 HC POSTOP RECOV ADDL HR: Performed by: UROLOGY

## 2021-02-18 PROCEDURE — 36000707: Performed by: UROLOGY

## 2021-02-18 PROCEDURE — D9220A PRA ANESTHESIA: ICD-10-PCS | Mod: ANES,,, | Performed by: ANESTHESIOLOGY

## 2021-02-18 PROCEDURE — 25000003 PHARM REV CODE 250: Performed by: STUDENT IN AN ORGANIZED HEALTH CARE EDUCATION/TRAINING PROGRAM

## 2021-02-18 PROCEDURE — 76872 PR US TRANSRECTAL: ICD-10-PCS | Mod: 26,,, | Performed by: UROLOGY

## 2021-02-18 PROCEDURE — 63600175 PHARM REV CODE 636 W HCPCS: Performed by: ANESTHESIOLOGY

## 2021-02-18 PROCEDURE — 71000015 HC POSTOP RECOV 1ST HR: Performed by: UROLOGY

## 2021-02-18 PROCEDURE — 88305 TISSUE EXAM BY PATHOLOGIST: ICD-10-PCS | Mod: 26,,, | Performed by: PATHOLOGY

## 2021-02-18 PROCEDURE — 52000 PR CYSTOURETHROSCOPY: ICD-10-PCS | Mod: 59,,, | Performed by: UROLOGY

## 2021-02-18 PROCEDURE — 71000039 HC RECOVERY, EACH ADD'L HOUR: Performed by: UROLOGY

## 2021-02-18 PROCEDURE — 74420 PR  X-RAY RETROGRADE PYELOGRAM: ICD-10-PCS | Mod: 26,,, | Performed by: UROLOGY

## 2021-02-18 PROCEDURE — 88305 TISSUE EXAM BY PATHOLOGIST: CPT | Performed by: PATHOLOGY

## 2021-02-18 PROCEDURE — 88305 TISSUE EXAM BY PATHOLOGIST: CPT | Mod: 26,,, | Performed by: PATHOLOGY

## 2021-02-18 PROCEDURE — 74420 UROGRAPHY RTRGR +-KUB: CPT | Mod: 26,,, | Performed by: UROLOGY

## 2021-02-18 PROCEDURE — 37000009 HC ANESTHESIA EA ADD 15 MINS: Performed by: UROLOGY

## 2021-02-18 PROCEDURE — C1758 CATHETER, URETERAL: HCPCS | Performed by: UROLOGY

## 2021-02-18 PROCEDURE — 71000033 HC RECOVERY, INTIAL HOUR: Performed by: UROLOGY

## 2021-02-18 PROCEDURE — 25000003 PHARM REV CODE 250: Performed by: ANESTHESIOLOGY

## 2021-02-18 PROCEDURE — 37000008 HC ANESTHESIA 1ST 15 MINUTES: Performed by: UROLOGY

## 2021-02-18 PROCEDURE — 55700 PR BIOPSY OF PROSTATE,NEEDLE/PUNCH: ICD-10-PCS | Mod: ,,, | Performed by: UROLOGY

## 2021-02-18 PROCEDURE — D9220A PRA ANESTHESIA: ICD-10-PCS | Mod: CRNA,,, | Performed by: NURSE ANESTHETIST, CERTIFIED REGISTERED

## 2021-02-18 PROCEDURE — 36000706: Performed by: UROLOGY

## 2021-02-18 PROCEDURE — 63600175 PHARM REV CODE 636 W HCPCS: Performed by: UROLOGY

## 2021-02-18 PROCEDURE — C1729 CATH, DRAINAGE: HCPCS | Performed by: UROLOGY

## 2021-02-18 PROCEDURE — 76872 US TRANSRECTAL: CPT | Mod: 26,,, | Performed by: UROLOGY

## 2021-02-18 PROCEDURE — D9220A PRA ANESTHESIA: Mod: ANES,,, | Performed by: ANESTHESIOLOGY

## 2021-02-18 PROCEDURE — 55700 PR BIOPSY OF PROSTATE,NEEDLE/PUNCH: CPT | Mod: ,,, | Performed by: UROLOGY

## 2021-02-18 RX ORDER — HYDROCODONE BITARTRATE AND ACETAMINOPHEN 5; 325 MG/1; MG/1
1 TABLET ORAL EVERY 4 HOURS PRN
Status: DISCONTINUED | OUTPATIENT
Start: 2021-02-18 | End: 2021-02-18 | Stop reason: HOSPADM

## 2021-02-18 RX ORDER — FENTANYL CITRATE 50 UG/ML
INJECTION, SOLUTION INTRAMUSCULAR; INTRAVENOUS
Status: DISCONTINUED | OUTPATIENT
Start: 2021-02-18 | End: 2021-02-18

## 2021-02-18 RX ORDER — FENTANYL CITRATE 50 UG/ML
25 INJECTION, SOLUTION INTRAMUSCULAR; INTRAVENOUS EVERY 5 MIN PRN
Status: DISCONTINUED | OUTPATIENT
Start: 2021-02-18 | End: 2021-02-18 | Stop reason: HOSPADM

## 2021-02-18 RX ORDER — SODIUM CHLORIDE, SODIUM LACTATE, POTASSIUM CHLORIDE, CALCIUM CHLORIDE 600; 310; 30; 20 MG/100ML; MG/100ML; MG/100ML; MG/100ML
INJECTION, SOLUTION INTRAVENOUS CONTINUOUS PRN
Status: DISCONTINUED | OUTPATIENT
Start: 2021-02-18 | End: 2021-02-18

## 2021-02-18 RX ORDER — SODIUM CHLORIDE 0.9 % (FLUSH) 0.9 %
3 SYRINGE (ML) INJECTION
Status: DISCONTINUED | OUTPATIENT
Start: 2021-02-18 | End: 2021-02-18 | Stop reason: HOSPADM

## 2021-02-18 RX ORDER — HYDROMORPHONE HYDROCHLORIDE 2 MG/ML
0.2 INJECTION, SOLUTION INTRAMUSCULAR; INTRAVENOUS; SUBCUTANEOUS EVERY 5 MIN PRN
Status: DISCONTINUED | OUTPATIENT
Start: 2021-02-18 | End: 2021-02-18 | Stop reason: HOSPADM

## 2021-02-18 RX ORDER — HYDROCODONE BITARTRATE AND ACETAMINOPHEN 5; 325 MG/1; MG/1
1 TABLET ORAL EVERY 6 HOURS PRN
Qty: 21 TABLET | Refills: 0 | Status: SHIPPED | OUTPATIENT
Start: 2021-02-18 | End: 2021-12-09 | Stop reason: ALTCHOICE

## 2021-02-18 RX ORDER — PROPOFOL 10 MG/ML
VIAL (ML) INTRAVENOUS
Status: DISCONTINUED | OUTPATIENT
Start: 2021-02-18 | End: 2021-02-18

## 2021-02-18 RX ORDER — CIPROFLOXACIN 500 MG/1
500 TABLET ORAL 2 TIMES DAILY
Qty: 6 TABLET | Refills: 0 | Status: SHIPPED | OUTPATIENT
Start: 2021-02-18 | End: 2021-02-21

## 2021-02-18 RX ORDER — SODIUM CHLORIDE, SODIUM LACTATE, POTASSIUM CHLORIDE, CALCIUM CHLORIDE 600; 310; 30; 20 MG/100ML; MG/100ML; MG/100ML; MG/100ML
INJECTION, SOLUTION INTRAVENOUS CONTINUOUS
Status: DISCONTINUED | OUTPATIENT
Start: 2021-02-18 | End: 2021-02-18 | Stop reason: HOSPADM

## 2021-02-18 RX ORDER — FINASTERIDE 5 MG/1
5 TABLET, FILM COATED ORAL DAILY
Qty: 30 TABLET | Refills: 11 | Status: SHIPPED | OUTPATIENT
Start: 2021-02-18 | End: 2022-08-18 | Stop reason: SDUPTHER

## 2021-02-18 RX ORDER — LIDOCAINE HYDROCHLORIDE 20 MG/ML
INJECTION INTRAVENOUS
Status: DISCONTINUED | OUTPATIENT
Start: 2021-02-18 | End: 2021-02-18

## 2021-02-18 RX ORDER — ONDANSETRON 2 MG/ML
INJECTION INTRAMUSCULAR; INTRAVENOUS
Status: DISCONTINUED | OUTPATIENT
Start: 2021-02-18 | End: 2021-02-18

## 2021-02-18 RX ORDER — LIDOCAINE HYDROCHLORIDE 10 MG/ML
1 INJECTION, SOLUTION EPIDURAL; INFILTRATION; INTRACAUDAL; PERINEURAL ONCE
Status: DISCONTINUED | OUTPATIENT
Start: 2021-02-18 | End: 2021-02-18 | Stop reason: HOSPADM

## 2021-02-18 RX ORDER — GENTAMICIN SULFATE 60 MG/50ML
120 INJECTION, SOLUTION INTRAVENOUS
Status: COMPLETED | OUTPATIENT
Start: 2021-02-18 | End: 2021-02-18

## 2021-02-18 RX ORDER — ACETAMINOPHEN 500 MG
1000 TABLET ORAL
Status: COMPLETED | OUTPATIENT
Start: 2021-02-18 | End: 2021-02-18

## 2021-02-18 RX ADMIN — SODIUM CHLORIDE, SODIUM LACTATE, POTASSIUM CHLORIDE, AND CALCIUM CHLORIDE: .6; .31; .03; .02 INJECTION, SOLUTION INTRAVENOUS at 07:02

## 2021-02-18 RX ADMIN — FENTANYL CITRATE 25 MCG: 50 INJECTION, SOLUTION INTRAMUSCULAR; INTRAVENOUS at 11:02

## 2021-02-18 RX ADMIN — GENTAMICIN SULFATE 120 MG: 60 INJECTION, SOLUTION INTRAVENOUS at 08:02

## 2021-02-18 RX ADMIN — SODIUM CHLORIDE, SODIUM LACTATE, POTASSIUM CHLORIDE, AND CALCIUM CHLORIDE: .6; .31; .03; .02 INJECTION, SOLUTION INTRAVENOUS at 08:02

## 2021-02-18 RX ADMIN — FENTANYL CITRATE 25 MCG: 50 INJECTION, SOLUTION INTRAMUSCULAR; INTRAVENOUS at 09:02

## 2021-02-18 RX ADMIN — PROPOFOL 150 MG: 10 INJECTION, EMULSION INTRAVENOUS at 08:02

## 2021-02-18 RX ADMIN — ACETAMINOPHEN 1000 MG: 500 TABLET ORAL at 10:02

## 2021-02-18 RX ADMIN — FENTANYL CITRATE 50 MCG: 50 INJECTION, SOLUTION INTRAMUSCULAR; INTRAVENOUS at 08:02

## 2021-02-18 RX ADMIN — PROPOFOL 50 MG: 10 INJECTION, EMULSION INTRAVENOUS at 08:02

## 2021-02-18 RX ADMIN — FENTANYL CITRATE 25 MCG: 50 INJECTION, SOLUTION INTRAMUSCULAR; INTRAVENOUS at 12:02

## 2021-02-18 RX ADMIN — Medication 100 MG: at 08:02

## 2021-02-18 RX ADMIN — ONDANSETRON 4 MG: 2 INJECTION, SOLUTION INTRAMUSCULAR; INTRAVENOUS at 08:02

## 2021-02-23 ENCOUNTER — TELEPHONE (OUTPATIENT)
Dept: FAMILY MEDICINE | Facility: CLINIC | Age: 66
End: 2021-02-23

## 2021-02-23 LAB
FINAL PATHOLOGIC DIAGNOSIS: NORMAL
GROSS: NORMAL
Lab: NORMAL

## 2021-02-24 ENCOUNTER — LAB VISIT (OUTPATIENT)
Dept: LAB | Facility: OTHER | Age: 66
End: 2021-02-24
Payer: MEDICARE

## 2021-02-24 DIAGNOSIS — Z20.822 ENCOUNTER FOR LABORATORY TESTING FOR COVID-19 VIRUS: ICD-10-CM

## 2021-02-24 PROCEDURE — U0003 INFECTIOUS AGENT DETECTION BY NUCLEIC ACID (DNA OR RNA); SEVERE ACUTE RESPIRATORY SYNDROME CORONAVIRUS 2 (SARS-COV-2) (CORONAVIRUS DISEASE [COVID-19]), AMPLIFIED PROBE TECHNIQUE, MAKING USE OF HIGH THROUGHPUT TECHNOLOGIES AS DESCRIBED BY CMS-2020-01-R: HCPCS

## 2021-02-25 ENCOUNTER — OFFICE VISIT (OUTPATIENT)
Dept: UROLOGY | Facility: CLINIC | Age: 66
End: 2021-02-25
Payer: MEDICARE

## 2021-02-25 VITALS — BODY MASS INDEX: 28.77 KG/M2 | HEIGHT: 68 IN | WEIGHT: 189.81 LBS

## 2021-02-25 DIAGNOSIS — R97.20 ELEVATED PSA: ICD-10-CM

## 2021-02-25 DIAGNOSIS — R31.0 GROSS HEMATURIA: ICD-10-CM

## 2021-02-25 DIAGNOSIS — R33.9 URINARY RETENTION: Primary | ICD-10-CM

## 2021-02-25 LAB — SARS-COV-2 RNA RESP QL NAA+PROBE: NOT DETECTED

## 2021-02-25 PROCEDURE — 51700 PR IRRIGATION, BLADDER: ICD-10-PCS | Mod: S$PBB,,, | Performed by: NURSE PRACTITIONER

## 2021-02-25 PROCEDURE — 99999 PR PBB SHADOW E&M-EST. PATIENT-LVL III: ICD-10-PCS | Mod: PBBFAC,,, | Performed by: NURSE PRACTITIONER

## 2021-02-25 PROCEDURE — 99214 PR OFFICE/OUTPT VISIT, EST, LEVL IV, 30-39 MIN: ICD-10-PCS | Mod: S$PBB,25,, | Performed by: NURSE PRACTITIONER

## 2021-02-25 PROCEDURE — 99214 OFFICE O/P EST MOD 30 MIN: CPT | Mod: S$PBB,25,, | Performed by: NURSE PRACTITIONER

## 2021-02-25 PROCEDURE — 51700 IRRIGATION OF BLADDER: CPT | Mod: PBBFAC | Performed by: NURSE PRACTITIONER

## 2021-02-25 PROCEDURE — 99999 PR PBB SHADOW E&M-EST. PATIENT-LVL III: CPT | Mod: PBBFAC,,, | Performed by: NURSE PRACTITIONER

## 2021-02-25 PROCEDURE — 51700 IRRIGATION OF BLADDER: CPT | Mod: S$PBB,,, | Performed by: NURSE PRACTITIONER

## 2021-02-25 PROCEDURE — 99213 OFFICE O/P EST LOW 20 MIN: CPT | Mod: PBBFAC | Performed by: NURSE PRACTITIONER

## 2021-03-03 ENCOUNTER — LAB VISIT (OUTPATIENT)
Dept: LAB | Facility: OTHER | Age: 66
End: 2021-03-03
Payer: MEDICARE

## 2021-03-03 DIAGNOSIS — Z20.822 ENCOUNTER FOR LABORATORY TESTING FOR COVID-19 VIRUS: ICD-10-CM

## 2021-03-03 PROCEDURE — U0003 INFECTIOUS AGENT DETECTION BY NUCLEIC ACID (DNA OR RNA); SEVERE ACUTE RESPIRATORY SYNDROME CORONAVIRUS 2 (SARS-COV-2) (CORONAVIRUS DISEASE [COVID-19]), AMPLIFIED PROBE TECHNIQUE, MAKING USE OF HIGH THROUGHPUT TECHNOLOGIES AS DESCRIBED BY CMS-2020-01-R: HCPCS | Performed by: NURSE PRACTITIONER

## 2021-03-04 LAB — SARS-COV-2 RNA RESP QL NAA+PROBE: NOT DETECTED

## 2021-03-09 ENCOUNTER — OFFICE VISIT (OUTPATIENT)
Dept: UROLOGY | Facility: CLINIC | Age: 66
End: 2021-03-09
Payer: MEDICARE

## 2021-03-09 VITALS — BODY MASS INDEX: 28.8 KG/M2 | WEIGHT: 190.06 LBS | HEIGHT: 68 IN

## 2021-03-09 DIAGNOSIS — R97.20 ELEVATED PSA: ICD-10-CM

## 2021-03-09 DIAGNOSIS — R31.0 GROSS HEMATURIA: Primary | ICD-10-CM

## 2021-03-09 DIAGNOSIS — N13.8 BPH WITH OBSTRUCTION/LOWER URINARY TRACT SYMPTOMS: ICD-10-CM

## 2021-03-09 DIAGNOSIS — N40.1 BPH WITH OBSTRUCTION/LOWER URINARY TRACT SYMPTOMS: ICD-10-CM

## 2021-03-09 DIAGNOSIS — R35.1 NOCTURIA MORE THAN TWICE PER NIGHT: ICD-10-CM

## 2021-03-09 PROCEDURE — 99214 OFFICE O/P EST MOD 30 MIN: CPT | Mod: S$PBB,,, | Performed by: UROLOGY

## 2021-03-09 PROCEDURE — 99214 PR OFFICE/OUTPT VISIT, EST, LEVL IV, 30-39 MIN: ICD-10-PCS | Mod: S$PBB,,, | Performed by: UROLOGY

## 2021-03-09 PROCEDURE — 99214 OFFICE O/P EST MOD 30 MIN: CPT | Mod: PBBFAC | Performed by: UROLOGY

## 2021-03-09 PROCEDURE — 99999 PR PBB SHADOW E&M-EST. PATIENT-LVL IV: CPT | Mod: PBBFAC,,, | Performed by: UROLOGY

## 2021-03-09 PROCEDURE — 99999 PR PBB SHADOW E&M-EST. PATIENT-LVL IV: ICD-10-PCS | Mod: PBBFAC,,, | Performed by: UROLOGY

## 2021-03-15 ENCOUNTER — PES CALL (OUTPATIENT)
Dept: ADMINISTRATIVE | Facility: CLINIC | Age: 66
End: 2021-03-15

## 2021-03-22 ENCOUNTER — TELEPHONE (OUTPATIENT)
Dept: FAMILY MEDICINE | Facility: CLINIC | Age: 66
End: 2021-03-22

## 2021-03-23 ENCOUNTER — TELEPHONE (OUTPATIENT)
Dept: FAMILY MEDICINE | Facility: CLINIC | Age: 66
End: 2021-03-23

## 2021-03-30 ENCOUNTER — OFFICE VISIT (OUTPATIENT)
Dept: FAMILY MEDICINE | Facility: CLINIC | Age: 66
End: 2021-03-30
Payer: MEDICARE

## 2021-03-30 ENCOUNTER — OFFICE VISIT (OUTPATIENT)
Dept: PODIATRY | Facility: CLINIC | Age: 66
End: 2021-03-30
Payer: MEDICARE

## 2021-03-30 ENCOUNTER — HOSPITAL ENCOUNTER (OUTPATIENT)
Dept: RADIOLOGY | Facility: HOSPITAL | Age: 66
Discharge: HOME | End: 2021-03-30
Attending: PODIATRIST
Payer: MEDICARE

## 2021-03-30 VITALS
DIASTOLIC BLOOD PRESSURE: 66 MMHG | SYSTOLIC BLOOD PRESSURE: 130 MMHG | HEIGHT: 68 IN | WEIGHT: 179.88 LBS | BODY MASS INDEX: 27.26 KG/M2

## 2021-03-30 VITALS
DIASTOLIC BLOOD PRESSURE: 60 MMHG | HEART RATE: 89 BPM | WEIGHT: 180 LBS | HEIGHT: 68 IN | TEMPERATURE: 98 F | SYSTOLIC BLOOD PRESSURE: 132 MMHG | OXYGEN SATURATION: 98 % | BODY MASS INDEX: 27.28 KG/M2

## 2021-03-30 DIAGNOSIS — Z79.4 TYPE 2 DIABETES MELLITUS WITH DIABETIC NEUROPATHY, WITH LONG-TERM CURRENT USE OF INSULIN: ICD-10-CM

## 2021-03-30 DIAGNOSIS — I69.398 SEIZURE DISORDER AS SEQUELA OF CEREBROVASCULAR ACCIDENT: Chronic | ICD-10-CM

## 2021-03-30 DIAGNOSIS — L97.521 ULCER OF BOTH FEET, LIMITED TO BREAKDOWN OF SKIN: ICD-10-CM

## 2021-03-30 DIAGNOSIS — Z99.2 ANEMIA IN CHRONIC KIDNEY DISEASE, ON CHRONIC DIALYSIS: ICD-10-CM

## 2021-03-30 DIAGNOSIS — I10 BENIGN ESSENTIAL HTN: Chronic | ICD-10-CM

## 2021-03-30 DIAGNOSIS — E78.00 PURE HYPERCHOLESTEROLEMIA: Chronic | ICD-10-CM

## 2021-03-30 DIAGNOSIS — L97.511 ULCER OF BOTH FEET, LIMITED TO BREAKDOWN OF SKIN: ICD-10-CM

## 2021-03-30 DIAGNOSIS — B35.1 ONYCHOMYCOSIS DUE TO DERMATOPHYTE: ICD-10-CM

## 2021-03-30 DIAGNOSIS — D63.1 ANEMIA IN CHRONIC KIDNEY DISEASE, ON CHRONIC DIALYSIS: ICD-10-CM

## 2021-03-30 DIAGNOSIS — E11.22 CONTROLLED TYPE 2 DIABETES MELLITUS WITH CHRONIC KIDNEY DISEASE ON CHRONIC DIALYSIS, WITH LONG-TERM CURRENT USE OF INSULIN: Primary | ICD-10-CM

## 2021-03-30 DIAGNOSIS — E11.49 TYPE II DIABETES MELLITUS WITH NEUROLOGICAL MANIFESTATIONS: Primary | ICD-10-CM

## 2021-03-30 DIAGNOSIS — I82.431 ACUTE DEEP VEIN THROMBOSIS (DVT) OF POPLITEAL VEIN OF RIGHT LOWER EXTREMITY: ICD-10-CM

## 2021-03-30 DIAGNOSIS — Z99.2 CONTROLLED TYPE 2 DIABETES MELLITUS WITH CHRONIC KIDNEY DISEASE ON CHRONIC DIALYSIS, WITH LONG-TERM CURRENT USE OF INSULIN: Primary | ICD-10-CM

## 2021-03-30 DIAGNOSIS — N18.6 ANEMIA IN CHRONIC KIDNEY DISEASE, ON CHRONIC DIALYSIS: ICD-10-CM

## 2021-03-30 DIAGNOSIS — N25.81 SECONDARY HYPERPARATHYROIDISM OF RENAL ORIGIN: ICD-10-CM

## 2021-03-30 DIAGNOSIS — I69.351 HEMIPLEGIA AND HEMIPARESIS FOLLOWING CEREBRAL INFARCTION AFFECTING RIGHT DOMINANT SIDE: Chronic | ICD-10-CM

## 2021-03-30 DIAGNOSIS — R53.1 RIGHT SIDED WEAKNESS: ICD-10-CM

## 2021-03-30 DIAGNOSIS — Z86.73 HISTORY OF STROKE: ICD-10-CM

## 2021-03-30 DIAGNOSIS — N18.6 ESRD (END STAGE RENAL DISEASE): ICD-10-CM

## 2021-03-30 DIAGNOSIS — E11.40 TYPE 2 DIABETES MELLITUS WITH DIABETIC NEUROPATHY, WITH LONG-TERM CURRENT USE OF INSULIN: ICD-10-CM

## 2021-03-30 DIAGNOSIS — N18.6 CONTROLLED TYPE 2 DIABETES MELLITUS WITH CHRONIC KIDNEY DISEASE ON CHRONIC DIALYSIS, WITH LONG-TERM CURRENT USE OF INSULIN: Primary | ICD-10-CM

## 2021-03-30 DIAGNOSIS — Z79.4 CONTROLLED TYPE 2 DIABETES MELLITUS WITH CHRONIC KIDNEY DISEASE ON CHRONIC DIALYSIS, WITH LONG-TERM CURRENT USE OF INSULIN: Primary | ICD-10-CM

## 2021-03-30 DIAGNOSIS — G40.909 SEIZURE DISORDER AS SEQUELA OF CEREBROVASCULAR ACCIDENT: Chronic | ICD-10-CM

## 2021-03-30 PROBLEM — N18.9 ANEMIA IN CHRONIC KIDNEY DISEASE: Status: ACTIVE | Noted: 2020-08-26

## 2021-03-30 PROCEDURE — 11721 DEBRIDE NAIL 6 OR MORE: CPT | Mod: Q9,S$PBB,, | Performed by: PODIATRIST

## 2021-03-30 PROCEDURE — 11721 PR DEBRIDEMENT OF NAILS, 6 OR MORE: ICD-10-PCS | Mod: Q9,S$PBB,, | Performed by: PODIATRIST

## 2021-03-30 PROCEDURE — 99999 PR PBB SHADOW E&M-EST. PATIENT-LVL IV: CPT | Mod: PBBFAC,,, | Performed by: PODIATRIST

## 2021-03-30 PROCEDURE — 73650 X-RAY EXAM OF HEEL: CPT | Mod: TC,50,FY,PO

## 2021-03-30 PROCEDURE — 11721 DEBRIDE NAIL 6 OR MORE: CPT | Mod: PBBFAC,PO | Performed by: PODIATRIST

## 2021-03-30 PROCEDURE — 99214 OFFICE O/P EST MOD 30 MIN: CPT | Mod: PBBFAC,25,27,PO | Performed by: PODIATRIST

## 2021-03-30 PROCEDURE — 99214 PR OFFICE/OUTPT VISIT, EST, LEVL IV, 30-39 MIN: ICD-10-PCS | Mod: 25,S$PBB,, | Performed by: PODIATRIST

## 2021-03-30 PROCEDURE — 99999 PR PBB SHADOW E&M-EST. PATIENT-LVL IV: ICD-10-PCS | Mod: PBBFAC,,, | Performed by: INTERNAL MEDICINE

## 2021-03-30 PROCEDURE — 99214 OFFICE O/P EST MOD 30 MIN: CPT | Mod: S$PBB,,, | Performed by: INTERNAL MEDICINE

## 2021-03-30 PROCEDURE — 73650 XR CALCANEUS BILATERAL: ICD-10-PCS | Mod: 26,50,, | Performed by: RADIOLOGY

## 2021-03-30 PROCEDURE — 73650 X-RAY EXAM OF HEEL: CPT | Mod: 26,50,, | Performed by: RADIOLOGY

## 2021-03-30 PROCEDURE — 99214 OFFICE O/P EST MOD 30 MIN: CPT | Mod: PBBFAC,PO,25 | Performed by: INTERNAL MEDICINE

## 2021-03-30 PROCEDURE — 99214 OFFICE O/P EST MOD 30 MIN: CPT | Mod: 25,S$PBB,, | Performed by: PODIATRIST

## 2021-03-30 PROCEDURE — 99999 PR PBB SHADOW E&M-EST. PATIENT-LVL IV: ICD-10-PCS | Mod: PBBFAC,,, | Performed by: PODIATRIST

## 2021-03-30 PROCEDURE — 99999 PR PBB SHADOW E&M-EST. PATIENT-LVL IV: CPT | Mod: PBBFAC,,, | Performed by: INTERNAL MEDICINE

## 2021-03-30 PROCEDURE — 99214 PR OFFICE/OUTPT VISIT, EST, LEVL IV, 30-39 MIN: ICD-10-PCS | Mod: S$PBB,,, | Performed by: INTERNAL MEDICINE

## 2021-03-30 RX ORDER — LEVETIRACETAM 250 MG/1
250 TABLET ORAL 2 TIMES DAILY
Qty: 80 TABLET | Refills: 11 | Status: SHIPPED | OUTPATIENT
Start: 2021-03-30 | End: 2021-12-16 | Stop reason: SDUPTHER

## 2021-03-30 RX ORDER — CLOPIDOGREL BISULFATE 75 MG/1
75 TABLET ORAL DAILY
Qty: 90 TABLET | Refills: 3 | Status: SHIPPED | OUTPATIENT
Start: 2021-03-30 | End: 2021-12-16 | Stop reason: SDUPTHER

## 2021-03-30 RX ORDER — ATORVASTATIN CALCIUM 80 MG/1
80 TABLET, FILM COATED ORAL DAILY
Qty: 90 TABLET | Refills: 3 | Status: SHIPPED | OUTPATIENT
Start: 2021-03-30 | End: 2021-04-06 | Stop reason: SDUPTHER

## 2021-03-30 RX ORDER — INSULIN PUMP SYRINGE, 3 ML
EACH MISCELLANEOUS
Qty: 1 EACH | Refills: 0 | Status: SHIPPED | OUTPATIENT
Start: 2021-03-30 | End: 2024-03-20 | Stop reason: CLARIF

## 2021-03-30 RX ORDER — HYDRALAZINE HYDROCHLORIDE 25 MG/1
25 TABLET, FILM COATED ORAL EVERY 8 HOURS
Qty: 270 TABLET | Refills: 3 | Status: SHIPPED | OUTPATIENT
Start: 2021-03-30 | End: 2021-12-09 | Stop reason: SDUPTHER

## 2021-03-30 RX ORDER — LANCETS
EACH MISCELLANEOUS
Qty: 100 EACH | Refills: 4 | Status: SHIPPED | OUTPATIENT
Start: 2021-03-30 | End: 2024-03-20 | Stop reason: CLARIF

## 2021-03-30 RX ORDER — LABETALOL 100 MG/1
100 TABLET, FILM COATED ORAL EVERY 12 HOURS
Qty: 60 TABLET | Refills: 3 | Status: SHIPPED | OUTPATIENT
Start: 2021-03-30 | End: 2021-12-09 | Stop reason: SDUPTHER

## 2021-03-30 RX ORDER — ATORVASTATIN CALCIUM 80 MG/1
80 TABLET, FILM COATED ORAL DAILY
Qty: 90 TABLET | Refills: 3 | Status: SHIPPED | OUTPATIENT
Start: 2021-03-30 | End: 2021-03-30 | Stop reason: SDUPTHER

## 2021-03-30 RX ORDER — AMLODIPINE BESYLATE 10 MG/1
10 TABLET ORAL DAILY
Qty: 90 TABLET | Refills: 3 | Status: SHIPPED | OUTPATIENT
Start: 2021-03-30 | End: 2021-12-16 | Stop reason: SDUPTHER

## 2021-03-30 RX ORDER — CYPROHEPTADINE HYDROCHLORIDE 4 MG/1
TABLET ORAL
COMMUNITY
Start: 2020-12-29 | End: 2022-08-18

## 2021-04-06 ENCOUNTER — TELEPHONE (OUTPATIENT)
Dept: FAMILY MEDICINE | Facility: CLINIC | Age: 66
End: 2021-04-06

## 2021-04-06 RX ORDER — ATORVASTATIN CALCIUM 80 MG/1
80 TABLET, FILM COATED ORAL DAILY
Qty: 90 TABLET | Refills: 3 | Status: SHIPPED | OUTPATIENT
Start: 2021-04-06 | End: 2022-08-18 | Stop reason: SDUPTHER

## 2021-04-09 ENCOUNTER — PES CALL (OUTPATIENT)
Dept: ADMINISTRATIVE | Facility: CLINIC | Age: 66
End: 2021-04-09

## 2021-04-21 ENCOUNTER — OFFICE VISIT (OUTPATIENT)
Dept: PODIATRY | Facility: CLINIC | Age: 66
End: 2021-04-21
Payer: MEDICARE

## 2021-04-21 VITALS — HEIGHT: 68 IN | BODY MASS INDEX: 27.26 KG/M2 | WEIGHT: 179.88 LBS

## 2021-04-21 DIAGNOSIS — L97.511 ULCER OF BOTH FEET, LIMITED TO BREAKDOWN OF SKIN: ICD-10-CM

## 2021-04-21 DIAGNOSIS — L89.610 DECUBITUS ULCER OF RIGHT HEEL, UNSTAGEABLE: Primary | ICD-10-CM

## 2021-04-21 DIAGNOSIS — L97.521 ULCER OF BOTH FEET, LIMITED TO BREAKDOWN OF SKIN: ICD-10-CM

## 2021-04-21 DIAGNOSIS — E11.49 TYPE II DIABETES MELLITUS WITH NEUROLOGICAL MANIFESTATIONS: ICD-10-CM

## 2021-04-21 PROCEDURE — 99214 PR OFFICE/OUTPT VISIT, EST, LEVL IV, 30-39 MIN: ICD-10-PCS | Mod: S$PBB,,, | Performed by: PODIATRIST

## 2021-04-21 PROCEDURE — 87070 CULTURE OTHR SPECIMN AEROBIC: CPT | Performed by: PODIATRIST

## 2021-04-21 PROCEDURE — 99999 PR PBB SHADOW E&M-EST. PATIENT-LVL IV: CPT | Mod: PBBFAC,,, | Performed by: PODIATRIST

## 2021-04-21 PROCEDURE — 99999 PR PBB SHADOW E&M-EST. PATIENT-LVL IV: ICD-10-PCS | Mod: PBBFAC,,, | Performed by: PODIATRIST

## 2021-04-21 PROCEDURE — 87075 CULTR BACTERIA EXCEPT BLOOD: CPT | Performed by: PODIATRIST

## 2021-04-21 PROCEDURE — 87186 SC STD MICRODIL/AGAR DIL: CPT | Mod: 59 | Performed by: PODIATRIST

## 2021-04-21 PROCEDURE — 99214 OFFICE O/P EST MOD 30 MIN: CPT | Mod: S$PBB,,, | Performed by: PODIATRIST

## 2021-04-21 PROCEDURE — 87077 CULTURE AEROBIC IDENTIFY: CPT | Performed by: PODIATRIST

## 2021-04-21 PROCEDURE — 99214 OFFICE O/P EST MOD 30 MIN: CPT | Mod: PBBFAC,PO | Performed by: PODIATRIST

## 2021-04-21 RX ORDER — DOXYCYCLINE 100 MG/1
100 CAPSULE ORAL EVERY 12 HOURS
Qty: 20 CAPSULE | Refills: 0 | Status: SHIPPED | OUTPATIENT
Start: 2021-04-21 | End: 2021-12-09 | Stop reason: ALTCHOICE

## 2021-04-22 ENCOUNTER — TELEPHONE (OUTPATIENT)
Dept: PODIATRY | Facility: CLINIC | Age: 66
End: 2021-04-22

## 2021-04-26 LAB
BACTERIA SPEC AEROBE CULT: ABNORMAL
BACTERIA SPEC AEROBE CULT: ABNORMAL

## 2021-04-27 LAB — BACTERIA SPEC ANAEROBE CULT: NORMAL

## 2021-05-10 ENCOUNTER — TELEPHONE (OUTPATIENT)
Dept: PODIATRY | Facility: CLINIC | Age: 66
End: 2021-05-10

## 2021-05-12 ENCOUNTER — OFFICE VISIT (OUTPATIENT)
Dept: PODIATRY | Facility: CLINIC | Age: 66
End: 2021-05-12
Payer: MEDICARE

## 2021-05-12 VITALS — BODY MASS INDEX: 27.13 KG/M2 | WEIGHT: 179 LBS | HEIGHT: 68 IN

## 2021-05-12 DIAGNOSIS — L97.412 HEEL ULCERATION, RIGHT, WITH FAT LAYER EXPOSED: Primary | ICD-10-CM

## 2021-05-12 PROCEDURE — 11042 DBRDMT SUBQ TIS 1ST 20SQCM/<: CPT | Mod: PBBFAC,PO | Performed by: PODIATRIST

## 2021-05-12 PROCEDURE — 99499 NO LOS: ICD-10-PCS | Mod: S$PBB,,, | Performed by: PODIATRIST

## 2021-05-12 PROCEDURE — 99214 OFFICE O/P EST MOD 30 MIN: CPT | Mod: PBBFAC,PO | Performed by: PODIATRIST

## 2021-05-12 PROCEDURE — 99999 PR PBB SHADOW E&M-EST. PATIENT-LVL IV: CPT | Mod: PBBFAC,,, | Performed by: PODIATRIST

## 2021-05-12 PROCEDURE — 99499 UNLISTED E&M SERVICE: CPT | Mod: S$PBB,,, | Performed by: PODIATRIST

## 2021-05-12 PROCEDURE — 99999 PR PBB SHADOW E&M-EST. PATIENT-LVL IV: ICD-10-PCS | Mod: PBBFAC,,, | Performed by: PODIATRIST

## 2021-05-12 PROCEDURE — 11042 PR DEBRIDEMENT, SKIN, SUB-Q TISSUE,=<20 SQ CM: ICD-10-PCS | Mod: S$PBB,,, | Performed by: PODIATRIST

## 2021-05-12 PROCEDURE — 11042 DBRDMT SUBQ TIS 1ST 20SQCM/<: CPT | Mod: S$PBB,,, | Performed by: PODIATRIST

## 2021-05-18 ENCOUNTER — TELEPHONE (OUTPATIENT)
Dept: FAMILY MEDICINE | Facility: CLINIC | Age: 66
End: 2021-05-18

## 2021-05-18 DIAGNOSIS — I69.351 HEMIPLEGIA AND HEMIPARESIS FOLLOWING CEREBRAL INFARCTION AFFECTING RIGHT DOMINANT SIDE: Primary | ICD-10-CM

## 2021-05-25 ENCOUNTER — HOSPITAL ENCOUNTER (OUTPATIENT)
Dept: WOUND CARE | Facility: HOSPITAL | Age: 66
Discharge: HOME | End: 2021-05-25
Attending: PODIATRIST
Payer: MEDICARE

## 2021-05-25 VITALS — SYSTOLIC BLOOD PRESSURE: 126 MMHG | HEART RATE: 70 BPM | DIASTOLIC BLOOD PRESSURE: 68 MMHG | TEMPERATURE: 97 F

## 2021-05-25 DIAGNOSIS — L97.412 HEEL ULCERATION, RIGHT, WITH FAT LAYER EXPOSED: ICD-10-CM

## 2021-05-25 PROCEDURE — 11042 DEBRIDEMENT: ICD-10-PCS | Mod: ,,, | Performed by: FAMILY MEDICINE

## 2021-05-25 PROCEDURE — 11042 DBRDMT SUBQ TIS 1ST 20SQCM/<: CPT | Mod: ,,, | Performed by: FAMILY MEDICINE

## 2021-05-25 PROCEDURE — 11042 DBRDMT SUBQ TIS 1ST 20SQCM/<: CPT | Performed by: FAMILY MEDICINE

## 2021-05-25 PROCEDURE — 99214 PR OFFICE/OUTPT VISIT, EST, LEVL IV, 30-39 MIN: ICD-10-PCS | Mod: 25,,, | Performed by: FAMILY MEDICINE

## 2021-05-25 PROCEDURE — 27201912 HC WOUND CARE DEBRIDEMENT SUPPLIES: Performed by: FAMILY MEDICINE

## 2021-05-25 PROCEDURE — 99214 OFFICE O/P EST MOD 30 MIN: CPT | Mod: 25,,, | Performed by: FAMILY MEDICINE

## 2021-06-01 ENCOUNTER — PES CALL (OUTPATIENT)
Dept: ADMINISTRATIVE | Facility: CLINIC | Age: 66
End: 2021-06-01

## 2021-06-08 ENCOUNTER — HOSPITAL ENCOUNTER (OUTPATIENT)
Dept: WOUND CARE | Facility: HOSPITAL | Age: 66
Discharge: HOME | End: 2021-06-08
Attending: FAMILY MEDICINE
Payer: MEDICARE

## 2021-06-08 VITALS
TEMPERATURE: 98 F | SYSTOLIC BLOOD PRESSURE: 114 MMHG | HEART RATE: 72 BPM | DIASTOLIC BLOOD PRESSURE: 61 MMHG | RESPIRATION RATE: 20 BRPM

## 2021-06-08 DIAGNOSIS — L97.412 HEEL ULCERATION, RIGHT, WITH FAT LAYER EXPOSED: Primary | ICD-10-CM

## 2021-06-08 PROCEDURE — 11042 DEBRIDEMENT: ICD-10-PCS | Mod: ,,, | Performed by: FAMILY MEDICINE

## 2021-06-08 PROCEDURE — 11042 DBRDMT SUBQ TIS 1ST 20SQCM/<: CPT | Performed by: FAMILY MEDICINE

## 2021-06-08 PROCEDURE — 99214 PR OFFICE/OUTPT VISIT, EST, LEVL IV, 30-39 MIN: ICD-10-PCS | Mod: 25,,, | Performed by: FAMILY MEDICINE

## 2021-06-08 PROCEDURE — 11042 DBRDMT SUBQ TIS 1ST 20SQCM/<: CPT | Mod: ,,, | Performed by: FAMILY MEDICINE

## 2021-06-08 PROCEDURE — 27201912 HC WOUND CARE DEBRIDEMENT SUPPLIES: Performed by: FAMILY MEDICINE

## 2021-06-08 PROCEDURE — 99214 OFFICE O/P EST MOD 30 MIN: CPT | Mod: 25,,, | Performed by: FAMILY MEDICINE

## 2021-06-17 ENCOUNTER — HOSPITAL ENCOUNTER (OUTPATIENT)
Dept: WOUND CARE | Facility: HOSPITAL | Age: 66
Discharge: HOME | End: 2021-06-17
Attending: FAMILY MEDICINE
Payer: MEDICARE

## 2021-06-17 VITALS
SYSTOLIC BLOOD PRESSURE: 125 MMHG | DIASTOLIC BLOOD PRESSURE: 66 MMHG | TEMPERATURE: 98 F | RESPIRATION RATE: 20 BRPM | HEART RATE: 74 BPM

## 2021-06-17 DIAGNOSIS — L97.412 HEEL ULCERATION, RIGHT, WITH FAT LAYER EXPOSED: Primary | ICD-10-CM

## 2021-06-17 PROCEDURE — 99214 PR OFFICE/OUTPT VISIT, EST, LEVL IV, 30-39 MIN: ICD-10-PCS | Mod: 25,,, | Performed by: FAMILY MEDICINE

## 2021-06-17 PROCEDURE — 11042 DEBRIDEMENT: ICD-10-PCS | Mod: ,,, | Performed by: FAMILY MEDICINE

## 2021-06-17 PROCEDURE — 11042 DBRDMT SUBQ TIS 1ST 20SQCM/<: CPT | Performed by: FAMILY MEDICINE

## 2021-06-17 PROCEDURE — 99214 OFFICE O/P EST MOD 30 MIN: CPT | Mod: 25,,, | Performed by: FAMILY MEDICINE

## 2021-06-17 PROCEDURE — 27201912 HC WOUND CARE DEBRIDEMENT SUPPLIES: Performed by: FAMILY MEDICINE

## 2021-06-17 PROCEDURE — 11042 DBRDMT SUBQ TIS 1ST 20SQCM/<: CPT | Mod: ,,, | Performed by: FAMILY MEDICINE

## 2021-06-18 ENCOUNTER — DOCUMENT SCAN (OUTPATIENT)
Dept: HOME HEALTH SERVICES | Facility: HOSPITAL | Age: 66
End: 2021-06-18
Payer: MEDICARE

## 2021-06-30 NOTE — TELEPHONE ENCOUNTER
Called Mrs. Huntley at the patient nursing home and left message for her to call back to reschedule his appointment    61.7

## 2021-07-09 ENCOUNTER — HOSPITAL ENCOUNTER (OUTPATIENT)
Dept: WOUND CARE | Facility: HOSPITAL | Age: 66
Discharge: HOME | End: 2021-07-09
Attending: FAMILY MEDICINE
Payer: MEDICARE

## 2021-07-09 VITALS
SYSTOLIC BLOOD PRESSURE: 110 MMHG | RESPIRATION RATE: 18 BRPM | DIASTOLIC BLOOD PRESSURE: 56 MMHG | HEART RATE: 71 BPM | TEMPERATURE: 98 F

## 2021-07-09 DIAGNOSIS — L97.412 HEEL ULCERATION, RIGHT, WITH FAT LAYER EXPOSED: Primary | ICD-10-CM

## 2021-07-09 PROCEDURE — 11042 DBRDMT SUBQ TIS 1ST 20SQCM/<: CPT | Mod: ,,, | Performed by: PODIATRIST

## 2021-07-09 PROCEDURE — 27201912 HC WOUND CARE DEBRIDEMENT SUPPLIES: Performed by: PODIATRIST

## 2021-07-09 PROCEDURE — 99499 UNLISTED E&M SERVICE: CPT | Mod: ,,, | Performed by: PODIATRIST

## 2021-07-09 PROCEDURE — 99499 NO LOS: ICD-10-PCS | Mod: ,,, | Performed by: PODIATRIST

## 2021-07-09 PROCEDURE — 11042 WOUND DEBRIDEMENT: ICD-10-PCS | Mod: ,,, | Performed by: PODIATRIST

## 2021-07-09 PROCEDURE — 11042 DBRDMT SUBQ TIS 1ST 20SQCM/<: CPT | Performed by: PODIATRIST

## 2021-07-14 ENCOUNTER — TELEPHONE (OUTPATIENT)
Dept: WOUND CARE | Facility: HOSPITAL | Age: 66
End: 2021-07-14

## 2021-07-23 ENCOUNTER — HOSPITAL ENCOUNTER (OUTPATIENT)
Dept: WOUND CARE | Facility: HOSPITAL | Age: 66
Discharge: HOME | End: 2021-07-23
Attending: PODIATRIST
Payer: MEDICARE

## 2021-07-23 VITALS — TEMPERATURE: 98 F | SYSTOLIC BLOOD PRESSURE: 131 MMHG | DIASTOLIC BLOOD PRESSURE: 67 MMHG | HEART RATE: 77 BPM

## 2021-07-23 DIAGNOSIS — L97.412 HEEL ULCERATION, RIGHT, WITH FAT LAYER EXPOSED: Primary | ICD-10-CM

## 2021-07-23 PROCEDURE — 99213 OFFICE O/P EST LOW 20 MIN: CPT | Performed by: PODIATRIST

## 2021-07-23 PROCEDURE — 99213 OFFICE O/P EST LOW 20 MIN: CPT | Mod: ,,, | Performed by: PODIATRIST

## 2021-07-23 PROCEDURE — 99213 PR OFFICE/OUTPT VISIT, EST, LEVL III, 20-29 MIN: ICD-10-PCS | Mod: ,,, | Performed by: PODIATRIST

## 2021-07-27 ENCOUNTER — OFFICE VISIT (OUTPATIENT)
Dept: PODIATRY | Facility: CLINIC | Age: 66
End: 2021-07-27
Payer: MEDICARE

## 2021-07-27 VITALS — WEIGHT: 179 LBS | HEIGHT: 68 IN | BODY MASS INDEX: 27.13 KG/M2

## 2021-07-27 DIAGNOSIS — E11.49 TYPE II DIABETES MELLITUS WITH NEUROLOGICAL MANIFESTATIONS: Primary | ICD-10-CM

## 2021-07-27 DIAGNOSIS — Z87.2 HEALED FOOT ULCER: ICD-10-CM

## 2021-07-27 PROCEDURE — 99999 PR PBB SHADOW E&M-EST. PATIENT-LVL II: CPT | Mod: PBBFAC,,, | Performed by: PODIATRIST

## 2021-07-27 PROCEDURE — 99212 OFFICE O/P EST SF 10 MIN: CPT | Mod: PBBFAC,PO | Performed by: PODIATRIST

## 2021-07-27 PROCEDURE — 99999 PR PBB SHADOW E&M-EST. PATIENT-LVL II: ICD-10-PCS | Mod: PBBFAC,,, | Performed by: PODIATRIST

## 2021-07-27 PROCEDURE — 99213 PR OFFICE/OUTPT VISIT, EST, LEVL III, 20-29 MIN: ICD-10-PCS | Mod: S$PBB,,, | Performed by: PODIATRIST

## 2021-07-27 PROCEDURE — 99213 OFFICE O/P EST LOW 20 MIN: CPT | Mod: S$PBB,,, | Performed by: PODIATRIST

## 2021-08-06 ENCOUNTER — PES CALL (OUTPATIENT)
Dept: ADMINISTRATIVE | Facility: CLINIC | Age: 66
End: 2021-08-06

## 2021-08-25 ENCOUNTER — PES CALL (OUTPATIENT)
Dept: ADMINISTRATIVE | Facility: CLINIC | Age: 66
End: 2021-08-25

## 2021-09-13 ENCOUNTER — PES CALL (OUTPATIENT)
Dept: ADMINISTRATIVE | Facility: CLINIC | Age: 66
End: 2021-09-13

## 2021-09-27 ENCOUNTER — PATIENT OUTREACH (OUTPATIENT)
Dept: ADMINISTRATIVE | Facility: OTHER | Age: 66
End: 2021-09-27

## 2021-09-28 ENCOUNTER — OFFICE VISIT (OUTPATIENT)
Dept: PODIATRY | Facility: CLINIC | Age: 66
End: 2021-09-28
Payer: MEDICARE

## 2021-09-28 VITALS — BODY MASS INDEX: 27.13 KG/M2 | HEIGHT: 68 IN | WEIGHT: 179 LBS

## 2021-09-28 DIAGNOSIS — B35.1 ONYCHOMYCOSIS DUE TO DERMATOPHYTE: ICD-10-CM

## 2021-09-28 DIAGNOSIS — E11.49 TYPE II DIABETES MELLITUS WITH NEUROLOGICAL MANIFESTATIONS: Primary | ICD-10-CM

## 2021-09-28 PROCEDURE — 11055 PR TRIM HYPERKERATOTIC SKIN LESION, ONE: ICD-10-PCS | Mod: Q9,S$PBB,, | Performed by: PODIATRIST

## 2021-09-28 PROCEDURE — 99999 PR PBB SHADOW E&M-EST. PATIENT-LVL II: CPT | Mod: PBBFAC,,, | Performed by: PODIATRIST

## 2021-09-28 PROCEDURE — 11721 PR DEBRIDEMENT OF NAILS, 6 OR MORE: ICD-10-PCS | Mod: Q9,59,S$PBB, | Performed by: PODIATRIST

## 2021-09-28 PROCEDURE — 99499 NO LOS: ICD-10-PCS | Mod: S$PBB,,, | Performed by: PODIATRIST

## 2021-09-28 PROCEDURE — 11721 DEBRIDE NAIL 6 OR MORE: CPT | Mod: Q9,59,S$PBB, | Performed by: PODIATRIST

## 2021-09-28 PROCEDURE — 99499 UNLISTED E&M SERVICE: CPT | Mod: S$PBB,,, | Performed by: PODIATRIST

## 2021-09-28 PROCEDURE — 11055 PARING/CUTG B9 HYPRKER LES 1: CPT | Mod: Q9,PBBFAC,PO | Performed by: PODIATRIST

## 2021-09-28 PROCEDURE — 99999 PR PBB SHADOW E&M-EST. PATIENT-LVL II: ICD-10-PCS | Mod: PBBFAC,,, | Performed by: PODIATRIST

## 2021-09-28 PROCEDURE — 99212 OFFICE O/P EST SF 10 MIN: CPT | Mod: PBBFAC,PO | Performed by: PODIATRIST

## 2021-09-28 PROCEDURE — 11055 PARING/CUTG B9 HYPRKER LES 1: CPT | Mod: Q9,S$PBB,, | Performed by: PODIATRIST

## 2021-09-28 PROCEDURE — 11721 DEBRIDE NAIL 6 OR MORE: CPT | Mod: PBBFAC,PO | Performed by: PODIATRIST

## 2021-10-14 DIAGNOSIS — E11.22 CONTROLLED TYPE 2 DIABETES MELLITUS WITH CHRONIC KIDNEY DISEASE ON CHRONIC DIALYSIS, WITH LONG-TERM CURRENT USE OF INSULIN: Primary | ICD-10-CM

## 2021-10-14 DIAGNOSIS — E78.00 PURE HYPERCHOLESTEROLEMIA: ICD-10-CM

## 2021-10-14 DIAGNOSIS — Z79.4 CONTROLLED TYPE 2 DIABETES MELLITUS WITH CHRONIC KIDNEY DISEASE ON CHRONIC DIALYSIS, WITH LONG-TERM CURRENT USE OF INSULIN: Primary | ICD-10-CM

## 2021-10-14 DIAGNOSIS — Z99.2 CONTROLLED TYPE 2 DIABETES MELLITUS WITH CHRONIC KIDNEY DISEASE ON CHRONIC DIALYSIS, WITH LONG-TERM CURRENT USE OF INSULIN: Primary | ICD-10-CM

## 2021-10-14 DIAGNOSIS — N18.6 CONTROLLED TYPE 2 DIABETES MELLITUS WITH CHRONIC KIDNEY DISEASE ON CHRONIC DIALYSIS, WITH LONG-TERM CURRENT USE OF INSULIN: Primary | ICD-10-CM

## 2021-11-02 ENCOUNTER — LAB VISIT (OUTPATIENT)
Dept: LAB | Facility: HOSPITAL | Age: 66
End: 2021-11-02
Attending: UROLOGY
Payer: MEDICARE

## 2021-11-02 ENCOUNTER — OFFICE VISIT (OUTPATIENT)
Dept: FAMILY MEDICINE | Facility: CLINIC | Age: 66
End: 2021-11-02
Payer: MEDICARE

## 2021-11-02 VITALS
SYSTOLIC BLOOD PRESSURE: 132 MMHG | HEIGHT: 68 IN | OXYGEN SATURATION: 99 % | TEMPERATURE: 98 F | WEIGHT: 179 LBS | BODY MASS INDEX: 27.13 KG/M2 | HEART RATE: 72 BPM | DIASTOLIC BLOOD PRESSURE: 84 MMHG

## 2021-11-02 DIAGNOSIS — R21 RASH: ICD-10-CM

## 2021-11-02 DIAGNOSIS — E11.22 CONTROLLED TYPE 2 DIABETES MELLITUS WITH CHRONIC KIDNEY DISEASE ON CHRONIC DIALYSIS, WITH LONG-TERM CURRENT USE OF INSULIN: ICD-10-CM

## 2021-11-02 DIAGNOSIS — L20.9 ATOPIC DERMATITIS, UNSPECIFIED TYPE: Primary | ICD-10-CM

## 2021-11-02 DIAGNOSIS — E78.00 PURE HYPERCHOLESTEROLEMIA: ICD-10-CM

## 2021-11-02 DIAGNOSIS — Z99.2 CONTROLLED TYPE 2 DIABETES MELLITUS WITH CHRONIC KIDNEY DISEASE ON CHRONIC DIALYSIS, WITH LONG-TERM CURRENT USE OF INSULIN: ICD-10-CM

## 2021-11-02 DIAGNOSIS — R97.20 ELEVATED PSA: ICD-10-CM

## 2021-11-02 DIAGNOSIS — Z79.4 CONTROLLED TYPE 2 DIABETES MELLITUS WITH CHRONIC KIDNEY DISEASE ON CHRONIC DIALYSIS, WITH LONG-TERM CURRENT USE OF INSULIN: ICD-10-CM

## 2021-11-02 DIAGNOSIS — N18.6 CONTROLLED TYPE 2 DIABETES MELLITUS WITH CHRONIC KIDNEY DISEASE ON CHRONIC DIALYSIS, WITH LONG-TERM CURRENT USE OF INSULIN: ICD-10-CM

## 2021-11-02 LAB
CHOLEST SERPL-MCNC: 133 MG/DL (ref 120–199)
CHOLEST/HDLC SERPL: 3.4 {RATIO} (ref 2–5)
COMPLEXED PSA SERPL-MCNC: 16.7 NG/ML (ref 0–4)
ESTIMATED AVG GLUCOSE: 123 MG/DL (ref 68–131)
HBA1C MFR BLD: 5.9 % (ref 4–5.6)
HDLC SERPL-MCNC: 39 MG/DL (ref 40–75)
HDLC SERPL: 29.3 % (ref 20–50)
LDLC SERPL CALC-MCNC: 72 MG/DL (ref 63–159)
NONHDLC SERPL-MCNC: 94 MG/DL
TRIGL SERPL-MCNC: 110 MG/DL (ref 30–150)

## 2021-11-02 PROCEDURE — 99213 PR OFFICE/OUTPT VISIT, EST, LEVL III, 20-29 MIN: ICD-10-PCS | Mod: S$PBB,,, | Performed by: NURSE PRACTITIONER

## 2021-11-02 PROCEDURE — 83036 HEMOGLOBIN GLYCOSYLATED A1C: CPT | Performed by: INTERNAL MEDICINE

## 2021-11-02 PROCEDURE — 99999 PR PBB SHADOW E&M-EST. PATIENT-LVL V: CPT | Mod: PBBFAC,,, | Performed by: NURSE PRACTITIONER

## 2021-11-02 PROCEDURE — 36415 COLL VENOUS BLD VENIPUNCTURE: CPT | Mod: PO | Performed by: UROLOGY

## 2021-11-02 PROCEDURE — 99213 OFFICE O/P EST LOW 20 MIN: CPT | Mod: S$PBB,,, | Performed by: NURSE PRACTITIONER

## 2021-11-02 PROCEDURE — 99215 OFFICE O/P EST HI 40 MIN: CPT | Mod: PBBFAC,PO | Performed by: NURSE PRACTITIONER

## 2021-11-02 PROCEDURE — 99999 PR PBB SHADOW E&M-EST. PATIENT-LVL V: ICD-10-PCS | Mod: PBBFAC,,, | Performed by: NURSE PRACTITIONER

## 2021-11-02 PROCEDURE — 80061 LIPID PANEL: CPT | Performed by: INTERNAL MEDICINE

## 2021-11-02 PROCEDURE — 84153 ASSAY OF PSA TOTAL: CPT | Performed by: UROLOGY

## 2021-11-02 RX ORDER — TRIAMCINOLONE ACETONIDE 1 MG/G
CREAM TOPICAL 2 TIMES DAILY
Qty: 15 G | Refills: 0 | Status: SHIPPED | OUTPATIENT
Start: 2021-11-02 | End: 2021-11-09

## 2021-11-09 ENCOUNTER — PATIENT OUTREACH (OUTPATIENT)
Dept: ADMINISTRATIVE | Facility: CLINIC | Age: 66
End: 2021-11-09
Payer: MEDICARE

## 2021-11-09 ENCOUNTER — EXTERNAL HOSPITAL ADMISSION (OUTPATIENT)
Dept: ADMINISTRATIVE | Facility: CLINIC | Age: 66
End: 2021-11-09
Payer: MEDICARE

## 2021-11-09 RX ORDER — CIPROFLOXACIN 500 MG/1
500 TABLET ORAL DAILY
COMMUNITY
Start: 2021-11-09 | End: 2021-11-16

## 2021-11-22 ENCOUNTER — TELEPHONE (OUTPATIENT)
Dept: FAMILY MEDICINE | Facility: CLINIC | Age: 66
End: 2021-11-22
Payer: MEDICARE

## 2021-11-22 DIAGNOSIS — L20.9 ATOPIC DERMATITIS, UNSPECIFIED TYPE: Primary | ICD-10-CM

## 2021-11-22 RX ORDER — HYDROCORTISONE VALERATE CREAM 2 MG/G
CREAM TOPICAL 2 TIMES DAILY
Qty: 15 G | Refills: 0 | Status: SHIPPED | OUTPATIENT
Start: 2021-11-22 | End: 2024-03-20 | Stop reason: CLARIF

## 2021-11-24 ENCOUNTER — TELEPHONE (OUTPATIENT)
Dept: UROLOGY | Facility: CLINIC | Age: 66
End: 2021-11-24
Payer: MEDICARE

## 2021-11-24 DIAGNOSIS — R97.20 ELEVATED PSA: Primary | ICD-10-CM

## 2021-12-07 ENCOUNTER — OFFICE VISIT (OUTPATIENT)
Dept: PODIATRY | Facility: CLINIC | Age: 66
End: 2021-12-07
Payer: MEDICARE

## 2021-12-07 VITALS — BODY MASS INDEX: 27.13 KG/M2 | HEIGHT: 68 IN | WEIGHT: 179 LBS

## 2021-12-07 DIAGNOSIS — E11.49 TYPE II DIABETES MELLITUS WITH NEUROLOGICAL MANIFESTATIONS: Primary | ICD-10-CM

## 2021-12-07 DIAGNOSIS — B35.1 ONYCHOMYCOSIS DUE TO DERMATOPHYTE: ICD-10-CM

## 2021-12-07 DIAGNOSIS — L84 PRE-ULCERATIVE CALLUSES: ICD-10-CM

## 2021-12-07 PROCEDURE — 99999 PR PBB SHADOW E&M-EST. PATIENT-LVL IV: CPT | Mod: PBBFAC,,, | Performed by: PODIATRIST

## 2021-12-07 PROCEDURE — 11055 PARING/CUTG B9 HYPRKER LES 1: CPT | Mod: Q9,S$PBB,, | Performed by: PODIATRIST

## 2021-12-07 PROCEDURE — 11721 PR DEBRIDEMENT OF NAILS, 6 OR MORE: ICD-10-PCS | Mod: Q9,59,S$PBB, | Performed by: PODIATRIST

## 2021-12-07 PROCEDURE — 11721 DEBRIDE NAIL 6 OR MORE: CPT | Mod: Q9,59,S$PBB, | Performed by: PODIATRIST

## 2021-12-07 PROCEDURE — 99214 OFFICE O/P EST MOD 30 MIN: CPT | Mod: PBBFAC,PO | Performed by: PODIATRIST

## 2021-12-07 PROCEDURE — 11055 PARING/CUTG B9 HYPRKER LES 1: CPT | Mod: Q9,PBBFAC,PO | Performed by: PODIATRIST

## 2021-12-07 PROCEDURE — 11055 PR TRIM HYPERKERATOTIC SKIN LESION, ONE: ICD-10-PCS | Mod: Q9,S$PBB,, | Performed by: PODIATRIST

## 2021-12-07 PROCEDURE — 99499 UNLISTED E&M SERVICE: CPT | Mod: S$PBB,,, | Performed by: PODIATRIST

## 2021-12-07 PROCEDURE — 99499 NO LOS: ICD-10-PCS | Mod: S$PBB,,, | Performed by: PODIATRIST

## 2021-12-07 PROCEDURE — 11721 DEBRIDE NAIL 6 OR MORE: CPT | Mod: PBBFAC,PO | Performed by: PODIATRIST

## 2021-12-07 PROCEDURE — 99999 PR PBB SHADOW E&M-EST. PATIENT-LVL IV: ICD-10-PCS | Mod: PBBFAC,,, | Performed by: PODIATRIST

## 2021-12-09 ENCOUNTER — OFFICE VISIT (OUTPATIENT)
Dept: FAMILY MEDICINE | Facility: CLINIC | Age: 66
End: 2021-12-09
Payer: MEDICARE

## 2021-12-09 VITALS
HEIGHT: 68 IN | TEMPERATURE: 98 F | WEIGHT: 176.06 LBS | HEART RATE: 79 BPM | RESPIRATION RATE: 16 BRPM | DIASTOLIC BLOOD PRESSURE: 60 MMHG | OXYGEN SATURATION: 99 % | BODY MASS INDEX: 26.68 KG/M2 | SYSTOLIC BLOOD PRESSURE: 90 MMHG

## 2021-12-09 DIAGNOSIS — I10 BENIGN ESSENTIAL HTN: ICD-10-CM

## 2021-12-09 DIAGNOSIS — N18.6 CONTROLLED TYPE 2 DIABETES MELLITUS WITH CHRONIC KIDNEY DISEASE ON CHRONIC DIALYSIS, WITH LONG-TERM CURRENT USE OF INSULIN: ICD-10-CM

## 2021-12-09 DIAGNOSIS — Z23 NEED FOR SHINGLES VACCINE: ICD-10-CM

## 2021-12-09 DIAGNOSIS — S61.309A COMPLETE AVULSION OF FINGERNAIL, INITIAL ENCOUNTER: ICD-10-CM

## 2021-12-09 DIAGNOSIS — I82.431 ACUTE DEEP VEIN THROMBOSIS (DVT) OF POPLITEAL VEIN OF RIGHT LOWER EXTREMITY: ICD-10-CM

## 2021-12-09 DIAGNOSIS — R21 RASH: Primary | ICD-10-CM

## 2021-12-09 DIAGNOSIS — E11.22 CONTROLLED TYPE 2 DIABETES MELLITUS WITH CHRONIC KIDNEY DISEASE ON CHRONIC DIALYSIS, WITH LONG-TERM CURRENT USE OF INSULIN: ICD-10-CM

## 2021-12-09 DIAGNOSIS — Z79.4 TYPE 2 DIABETES MELLITUS WITH DIABETIC NEUROPATHY, WITH LONG-TERM CURRENT USE OF INSULIN: ICD-10-CM

## 2021-12-09 DIAGNOSIS — E11.9 DM TYPE 2 WITHOUT RETINOPATHY: ICD-10-CM

## 2021-12-09 DIAGNOSIS — N25.81 SECONDARY HYPERPARATHYROIDISM OF RENAL ORIGIN: ICD-10-CM

## 2021-12-09 DIAGNOSIS — Z79.4 CONTROLLED TYPE 2 DIABETES MELLITUS WITH CHRONIC KIDNEY DISEASE ON CHRONIC DIALYSIS, WITH LONG-TERM CURRENT USE OF INSULIN: ICD-10-CM

## 2021-12-09 DIAGNOSIS — E78.00 PURE HYPERCHOLESTEROLEMIA: ICD-10-CM

## 2021-12-09 DIAGNOSIS — I10 BENIGN ESSENTIAL HTN: Chronic | ICD-10-CM

## 2021-12-09 DIAGNOSIS — N18.6 ESRD (END STAGE RENAL DISEASE): ICD-10-CM

## 2021-12-09 DIAGNOSIS — E11.40 TYPE 2 DIABETES MELLITUS WITH DIABETIC NEUROPATHY, WITH LONG-TERM CURRENT USE OF INSULIN: ICD-10-CM

## 2021-12-09 DIAGNOSIS — Z99.2 CONTROLLED TYPE 2 DIABETES MELLITUS WITH CHRONIC KIDNEY DISEASE ON CHRONIC DIALYSIS, WITH LONG-TERM CURRENT USE OF INSULIN: ICD-10-CM

## 2021-12-09 PROCEDURE — 99214 OFFICE O/P EST MOD 30 MIN: CPT | Mod: S$PBB,,, | Performed by: INTERNAL MEDICINE

## 2021-12-09 PROCEDURE — 87220 TISSUE EXAM FOR FUNGI: CPT | Performed by: INTERNAL MEDICINE

## 2021-12-09 PROCEDURE — 99999 PR PBB SHADOW E&M-EST. PATIENT-LVL V: CPT | Mod: PBBFAC,,, | Performed by: INTERNAL MEDICINE

## 2021-12-09 PROCEDURE — 99215 OFFICE O/P EST HI 40 MIN: CPT | Mod: PBBFAC,PO | Performed by: INTERNAL MEDICINE

## 2021-12-09 PROCEDURE — 99214 PR OFFICE/OUTPT VISIT, EST, LEVL IV, 30-39 MIN: ICD-10-PCS | Mod: S$PBB,,, | Performed by: INTERNAL MEDICINE

## 2021-12-09 PROCEDURE — 99999 PR PBB SHADOW E&M-EST. PATIENT-LVL V: ICD-10-PCS | Mod: PBBFAC,,, | Performed by: INTERNAL MEDICINE

## 2021-12-09 RX ORDER — TRIAMCINOLONE ACETONIDE 1 MG/G
1 CREAM TOPICAL
COMMUNITY
Start: 2021-11-02 | End: 2021-12-09 | Stop reason: SDUPTHER

## 2021-12-09 RX ORDER — TAMSULOSIN HYDROCHLORIDE 0.4 MG/1
CAPSULE ORAL
COMMUNITY
Start: 2021-11-12 | End: 2021-12-09 | Stop reason: SDUPTHER

## 2021-12-09 RX ORDER — HYDRALAZINE HYDROCHLORIDE 25 MG/1
25 TABLET, FILM COATED ORAL EVERY 12 HOURS
Qty: 180 TABLET | Refills: 3 | Status: SHIPPED | OUTPATIENT
Start: 2021-12-09 | End: 2022-08-18 | Stop reason: ALTCHOICE

## 2021-12-09 RX ORDER — LABETALOL 100 MG/1
100 TABLET, FILM COATED ORAL EVERY 12 HOURS
Qty: 180 TABLET | Refills: 3 | Status: SHIPPED | OUTPATIENT
Start: 2021-12-09 | End: 2022-08-18 | Stop reason: SDUPTHER

## 2021-12-10 ENCOUNTER — TELEPHONE (OUTPATIENT)
Dept: FAMILY MEDICINE | Facility: CLINIC | Age: 66
End: 2021-12-10
Payer: MEDICARE

## 2021-12-10 DIAGNOSIS — R21 RASH OF BACK: Primary | ICD-10-CM

## 2021-12-10 LAB — KOH PREP SPEC: NORMAL

## 2021-12-10 RX ORDER — CLOTRIMAZOLE AND BETAMETHASONE DIPROPIONATE 10; .64 MG/G; MG/G
CREAM TOPICAL 2 TIMES DAILY
Qty: 45 G | Refills: 0 | Status: SHIPPED | OUTPATIENT
Start: 2021-12-10 | End: 2021-12-24

## 2021-12-16 DIAGNOSIS — I69.398 SEIZURE DISORDER AS SEQUELA OF CEREBROVASCULAR ACCIDENT: Chronic | ICD-10-CM

## 2021-12-16 DIAGNOSIS — I10 BENIGN ESSENTIAL HTN: Chronic | ICD-10-CM

## 2021-12-16 DIAGNOSIS — N25.81 SECONDARY HYPERPARATHYROIDISM OF RENAL ORIGIN: ICD-10-CM

## 2021-12-16 DIAGNOSIS — N18.6 ESRD (END STAGE RENAL DISEASE): ICD-10-CM

## 2021-12-16 DIAGNOSIS — N40.0 BENIGN NON-NODULAR PROSTATIC HYPERPLASIA WITHOUT LOWER URINARY TRACT SYMPTOMS: Chronic | ICD-10-CM

## 2021-12-16 DIAGNOSIS — Z86.73 HISTORY OF STROKE: ICD-10-CM

## 2021-12-16 DIAGNOSIS — G40.909 SEIZURE DISORDER AS SEQUELA OF CEREBROVASCULAR ACCIDENT: Chronic | ICD-10-CM

## 2021-12-16 RX ORDER — TAMSULOSIN HYDROCHLORIDE 0.4 MG/1
2 CAPSULE ORAL DAILY
Qty: 180 CAPSULE | Refills: 3 | Status: SHIPPED | OUTPATIENT
Start: 2021-12-16 | End: 2022-06-10 | Stop reason: SDUPTHER

## 2021-12-16 RX ORDER — SEVELAMER CARBONATE 800 MG/1
800 TABLET, FILM COATED ORAL
Qty: 90 TABLET | Refills: 3 | Status: SHIPPED | OUTPATIENT
Start: 2021-12-16 | End: 2022-04-23

## 2021-12-16 RX ORDER — AMLODIPINE BESYLATE 10 MG/1
10 TABLET ORAL DAILY
Qty: 90 TABLET | Refills: 3 | Status: SHIPPED | OUTPATIENT
Start: 2021-12-16 | End: 2022-08-18 | Stop reason: SDUPTHER

## 2021-12-16 RX ORDER — VIT C/E/ZN/COPPR/LUTEIN/ZEAXAN 250MG-90MG
1000 CAPSULE ORAL DAILY
Qty: 90 CAPSULE | Refills: 3 | Status: SHIPPED | OUTPATIENT
Start: 2021-12-16 | End: 2024-03-20 | Stop reason: CLARIF

## 2021-12-16 RX ORDER — CLOPIDOGREL BISULFATE 75 MG/1
75 TABLET ORAL DAILY
Qty: 90 TABLET | Refills: 3 | Status: SHIPPED | OUTPATIENT
Start: 2021-12-16 | End: 2022-11-03 | Stop reason: SDUPTHER

## 2021-12-16 RX ORDER — LEVETIRACETAM 250 MG/1
250 TABLET ORAL 2 TIMES DAILY
Qty: 80 TABLET | Refills: 11 | Status: SHIPPED | OUTPATIENT
Start: 2021-12-16 | End: 2022-08-18 | Stop reason: SDUPTHER

## 2021-12-23 NOTE — ADDENDUM NOTE
Encounter addended by: Gogo Katz PT on: 8/31/2017 12:40 PM<BR>    Actions taken: Sign clinical note
GENERAL: NAD, well-groomed, well-developed  HEAD:  Atraumatic, Normocephalic  EYES: EOMI, PERRLA, conjunctiva and sclera clear  ENMT: No tonsillar erythema, exudates, or enlargement; Moist mucous membranes, Good dentition, No lesions  NECK: Supple, normal appearance, No JVD; Normal thyroid; Trachea midline  NERVOUS SYSTEM:  Alert & Oriented X3,  Motor Strength 5/5 B/L upper and lower extremities, sensation intact  CHEST/LUNG: Lungs clear to auscultation bilaterally, No rales, rhonchi, wheezing   HEART: Regular rate and rhythm; No murmurs, rubs, or gallops  ABDOMEN: Soft, Nontender, Nondistended; Bowel sounds present  EXTREMITIES:  2+ Peripheral Pulses, No clubbing, cyanosis, or edema  LYMPH: No lymphadenopathy noted  SKIN: No rashes or lesions;  Good capillary refill

## 2021-12-30 ENCOUNTER — PATIENT OUTREACH (OUTPATIENT)
Dept: ADMINISTRATIVE | Facility: OTHER | Age: 66
End: 2021-12-30
Payer: MEDICARE

## 2022-01-05 ENCOUNTER — TELEPHONE (OUTPATIENT)
Dept: FAMILY MEDICINE | Facility: CLINIC | Age: 67
End: 2022-01-05
Payer: MEDICARE

## 2022-01-05 NOTE — PROGRESS NOTES
Fit Chief Complaint  Chief Complaint   Patient presents with    Nail Problem    Rash       HPI    HPI   Mr. Miguel Moore is a 66 y.o male with multiple medical problems as listed below. The patient presents to clinic with with non healing rash on the upper back. The patient has been seen multiple times by PCP. Steroids were tried unsuccessfully. The patient had a KOH done and which was negative. The patient then was prescribed Lotrisone. The patient states that he thinks that the cream is working but that it is out of it. The patient did not want to be referred to derm as was suggested by PCP.    The patient also c/o of nail problem. Thee patient had a complete avulsion of the nail and was seen on 12/09/2021. The patient was told to keep it covered and apply Vaseline as a barrier. The patient states that he followed the instructions however, the nail is not growing properly. The patient denies any pain or any other complications.     PAST MEDICAL HISTORY:  Past Medical History:   Diagnosis Date    Allergy     Clotting disorder     Elaquis and APlavix    Deep vein thrombosis     Diabetes mellitus, type 2     Hypertension     Nuclear sclerosis of both eyes 6/9/2017    Renal disorder     Seizures     Stroke     Urinary tract infection        PAST SURGICAL HISTORY:  Past Surgical History:   Procedure Laterality Date    CATARACT EXTRACTION W/  INTRAOCULAR LENS IMPLANT Right 10/05/2017    Dr. Tay    CATARACT EXTRACTION W/  INTRAOCULAR LENS IMPLANT Left 10/19/2017    Dr. Tay    CYSTOSCOPY W/ RETROGRADES Bilateral 2/18/2021    Procedure: CYSTOSCOPY, WITH RETROGRADE PYELOGRAM;  Surgeon: JEMMA Styles MD;  Location: [facility];  Service: Urology;  Laterality: Bilateral;  REQUESTING EARLY AS POSSIBE-LO / 2/8/2021 @ 1:13PM  RN Pre Op 2-11-21, Covid NEGATIVE ON  2-17-21.  C A    ESOPHAGOGASTRODUODENOSCOPY N/A 8/17/2020    Procedure: EGD (ESOPHAGOGASTRODUODENOSCOPY);  Surgeon: Desmond Chapman,  MD;  Location[facility];  Service: Endoscopy;  Laterality: N/A;    EYE SURGERY Bilateral     cataract    FEMORAL ARTERY STENT      FRACTURE SURGERY      KNEE SURGERY      bilateral scope       SOCIAL HISTORY:  Social History     Socioeconomic History    Marital status: Single   Occupational History    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, etc   Tobacco Use    Smoking status: Never Smoker    Smokeless tobacco: Never Used   Substance and Sexual Activity    Alcohol use: No    Drug use: No   Social History Narrative    Lives with daughter       FAMILY HISTORY:  Family History   Problem Relation Age of Onset    Diabetes Mother     Heart disease Mother     Hypertension Mother     Cataracts Mother     No Known Problems Father     Hypertension Sister     No Known Problems Brother     No Known Problems Daughter     No Known Problems Maternal Aunt     No Known Problems Maternal Uncle     No Known Problems Paternal Aunt     No Known Problems Paternal Uncle     No Known Problems Maternal Grandmother     No Known Problems Maternal Grandfather     No Known Problems Paternal Grandmother     No Known Problems Paternal Grandfather     Amblyopia Neg Hx     Blindness Neg Hx     Cancer Neg Hx     Glaucoma Neg Hx     Macular degeneration Neg Hx     Retinal detachment Neg Hx     Strabismus Neg Hx     Stroke Neg Hx     Thyroid disease Neg Hx        ALLERGIES AND MEDICATIONS: updated and reviewed.  Review of patient's allergies indicates:   Allergen Reactions    Ace inhibitors      Hyperkalemia 8/2018  Other reaction(s): Unknown    Penicillins Hives    Tizanidine      [place] hot     Current Outpatient Medications   Medication Sig Dispense Refill    amLODIPine (NORVASC) 10 MG tablet Take 1 tablet (10 mg total) by mouth once daily. 90 tablet 3    apixaban (ELIQUIS) 5 mg Tab Take 1 tablet (5 mg total) by mouth 2 (two) times daily. 180 tablet 3    atorvastatin (LIPITOR) 80 MG tablet  "Take 1 tablet (80 mg total) by mouth once daily. 90 tablet 3    blood sugar diagnostic Strp To check BG 1 times daily, to use with insurance preferred meter 100 strip 3    blood-glucose meter kit To check BG 1 times daily, to use with insurance preferred meter 1 each 0    calcitRIOL (ROCALTROL) 0.25 MCG Cap Take 1 capsule (0.25 mcg total) by mouth once daily.      cholecalciferol, vitamin D3, (VITAMIN D3) 25 mcg (1,000 unit) capsule Take 1 capsule (1,000 Units total) by mouth once daily. 90 capsule 3    clopidogreL (PLAVIX) 75 mg tablet Take 1 tablet (75 mg total) by mouth once daily. 90 tablet 3    cyproheptadine (PERIACTIN) 4 mg tablet       ergocalciferol, vitamin D2, (VITAMIN D ORAL) Take 1 mcg by mouth.      heparin sodium,porcine (HEPARIN, PORCINE,) 1,000 unit/mL Soln injection by Intra-Catheter route.      hydrALAZINE (APRESOLINE) 25 MG tablet Take 1 tablet (25 mg total) by mouth every 12 (twelve) hours. 180 tablet 3    labetaloL (NORMODYNE) 100 MG tablet Take 1 tablet (100 mg total) by mouth every 12 (twelve) hours. 180 tablet 3    lancets Misc To check BG 1 times daily, to use with insurance preferred meter 100 each 4    levETIRAcetam (KEPPRA) 250 MG Tab Take 1 tablet (250 mg total) by mouth 2 (two) times daily on Tuesday, Thursday, Saturday & Sunday. Take 2 tablets (500mg total) by mouth in the morning and 1 tablet (250mg total) in the evening on dialysis days (Monday, Wednesday, & Friday) 80 tablet 11    methoxy peg-epoetin beta (MIRCERA INJ) 50 mcg.      pen needle, diabetic 31 gauge x 1/4" Ndle For mealtime insulin 300 each 11    RENVELA 800 mg Tab Take 1 tablet (800 mg total) by mouth 3 (three) times daily with meals. 90 tablet 3    sodium chloride 0.9% SolP 1,000 mL with heparin (porcine) 10,000 unit/mL Soln 90,000 Units by Intra-Catheter route.      tamsulosin (FLOMAX) 0.4 mg Cap Take 2 capsules (0.8 mg total) by mouth once daily. 180 capsule 3    clotrimazole-betamethasone 1-0.05% " (LOTRISONE) cream Apply topically 2 (two) times daily. 15 g 0    ferrous gluconate 324 mg (37.5 mg iron) Tab tablet Take 1 tablet (324 mg total) by mouth 2 (two) times daily with meals. 60 tablet 11    finasteride (PROSCAR) 5 mg tablet Take 1 tablet (5 mg total) by mouth once daily. (Patient not taking: No sig reported) 30 tablet 11    folic acid (FOLVITE) 1 MG tablet Take 1 tablet (1 mg total) by mouth once daily. 30 tablet 0    hydrocortisone (WESTCORT) 0.2 % cream Apply topically 2 (two) times daily. (Patient not taking: No sig reported) 15 g 0     No current facility-administered medications for this visit.       Patient Care Team:  Raciel Raymond MD as PCP - General (Internal Medicine)  Gabriella Manjarrez DPM as Consulting Physician (Podiatry)  Mac Chambers Jr., MD as Consulting Physician (Urology)  Geovany Rucker MD as Consulting Physician (Neurology)  Bob Tay MD as Consulting Physician (Ophthalmology)  Tres Reyna MA as Care Coordinator  Adrian Millard MD as Consulting Physician (Nephrology)    ROS  Review of Systems   Constitutional: Negative for chills, fatigue, fever and unexpected weight change.   HENT: Negative for congestion, ear pain, sore throat and voice change.    Eyes: Negative for photophobia, pain, discharge and visual disturbance.   Respiratory: Negative for cough, shortness of breath and wheezing.    Cardiovascular: Negative for chest pain, palpitations and leg swelling.   Gastrointestinal: Negative for abdominal pain, blood in stool, constipation, diarrhea, nausea and vomiting.   Genitourinary: Negative for dysuria and frequency.   Musculoskeletal: Negative for gait problem, joint swelling and neck stiffness.   Skin: Positive for rash and wound. Negative for color change.   Neurological: Negative for seizures, weakness and headaches.   Hematological: Negative for adenopathy. Does not bruise/bleed easily.   Psychiatric/Behavioral: Negative for behavioral  "problems and dysphoric mood. The patient is not nervous/anxious.            Physical Exam  Vitals:    01/06/22 1031   BP: 129/62   Pulse: 61   Temp: 98 °F (36.7 °C)   SpO2: 99%   Weight: 79.9 kg (176 lb 2.4 oz)   Height: 5' 8" (1.727 m)    Body mass index is 26.78 kg/m².  Weight: 79.9 kg (176 lb 2.4 oz)   Height: 5' 8" (172.7 cm)     Physical Exam  Constitutional:       Appearance: He is well-developed and well-nourished.   HENT:      Head: Normocephalic and atraumatic.   Eyes:      Extraocular Movements: EOM normal.      Conjunctiva/sclera: Conjunctivae normal.      Pupils: Pupils are equal, round, and reactive to light.   Neck:      Thyroid: No thyromegaly.   Cardiovascular:      Rate and Rhythm: Normal rate and regular rhythm.      Pulses: Intact distal pulses.      Heart sounds: No murmur heard.      Pulmonary:      Effort: Pulmonary effort is normal.      Breath sounds: Normal breath sounds. No wheezing.   Musculoskeletal:         General: No edema. Normal range of motion.      Cervical back: Normal range of motion and neck supple.   Lymphadenopathy:      Cervical: No cervical adenopathy.   Skin:     General: Skin is warm and dry.      Findings: Rash (left upper back) present. Rash is crusting.      Comments: LEFT hand middle finger has hard callus to the tip of the finger. Also nail bed is elevated and calloused.   Neurological:      Mental Status: He is alert and oriented to person, place, and time.   Psychiatric:         Mood and Affect: Mood and affect normal.         Health Maintenance       Date Due Completion Date    Shingles Vaccine (2 of 2) 09/25/2020 7/31/2020    Colorectal Cancer Screening 08/17/2021 8/17/2020    Eye Exam 09/22/2021 9/22/2020    Hemoglobin A1c 05/02/2022 11/2/2021    Pneumococcal Vaccines (Age 65+) (3 of 4 - PPSV23) 05/08/2022 9/9/2019    Lipid Panel 11/02/2022 11/2/2021    Foot Exam 12/07/2022 12/7/2021 (Done)    Override on 12/7/2021: Done    Override on 1/9/2020: Done    Override " on 4/24/2019: Done    Override on 1/15/2018: Done    Override on 4/3/2017: Done (Gabriella Manjarrez/Podiatry)    TETANUS VACCINE 05/08/2027 5/8/2017        Health maintenance reviewed at this time.  Assessment & Plan    Rash  -     clotrimazole-betamethasone 1-0.05% (LOTRISONE) cream; Apply topically 2 (two) times daily.  Dispense: 15 g; Refill: 0  The current medical regimen is effective;  continue present plan and medications.    Nail abnormalities/Callus  Educated patient that nail will most likely grow out and he will be able to cut of the deformity. Also recommened filing down the callus to help the nail grow properly. The patient verbalized understanding.     Will refer to general surgery if there is no improvement.     Screening for colon cancer  -     Fecal Immunochemical Test (iFOBT); Future; Expected date: 01/06/2022    Benign essential HTN  The current medical regimen is effective;  continue present plan and medications.    DM type 2 without retinopathy/ESRD (end stage renal disease)  Stable. Patient is on dialysis.         Follow-up: Follow up if symptoms worsen or fail to improve.

## 2022-01-06 ENCOUNTER — OFFICE VISIT (OUTPATIENT)
Dept: OPTOMETRY | Facility: CLINIC | Age: 67
End: 2022-01-06
Payer: MEDICARE

## 2022-01-06 ENCOUNTER — OFFICE VISIT (OUTPATIENT)
Dept: FAMILY MEDICINE | Facility: CLINIC | Age: 67
End: 2022-01-06
Payer: MEDICARE

## 2022-01-06 VITALS
HEART RATE: 61 BPM | OXYGEN SATURATION: 99 % | DIASTOLIC BLOOD PRESSURE: 62 MMHG | BODY MASS INDEX: 26.69 KG/M2 | TEMPERATURE: 98 F | SYSTOLIC BLOOD PRESSURE: 129 MMHG | WEIGHT: 176.13 LBS | HEIGHT: 68 IN

## 2022-01-06 DIAGNOSIS — L84 CALLUS: ICD-10-CM

## 2022-01-06 DIAGNOSIS — R21 RASH: Primary | ICD-10-CM

## 2022-01-06 DIAGNOSIS — E11.9 TYPE 2 DIABETES MELLITUS WITHOUT RETINOPATHY: Primary | ICD-10-CM

## 2022-01-06 DIAGNOSIS — H52.7 REFRACTIVE ERROR: ICD-10-CM

## 2022-01-06 DIAGNOSIS — L60.9 NAIL ABNORMALITIES: ICD-10-CM

## 2022-01-06 DIAGNOSIS — I10 BENIGN ESSENTIAL HTN: ICD-10-CM

## 2022-01-06 DIAGNOSIS — E11.9 DM TYPE 2 WITHOUT RETINOPATHY: ICD-10-CM

## 2022-01-06 DIAGNOSIS — Z12.11 SCREENING FOR COLON CANCER: ICD-10-CM

## 2022-01-06 DIAGNOSIS — N18.6 ESRD (END STAGE RENAL DISEASE): ICD-10-CM

## 2022-01-06 DIAGNOSIS — Z96.1 PSEUDOPHAKIA: ICD-10-CM

## 2022-01-06 PROCEDURE — 92015 DETERMINE REFRACTIVE STATE: CPT | Mod: ,,, | Performed by: OPTOMETRIST

## 2022-01-06 PROCEDURE — 99214 PR OFFICE/OUTPT VISIT, EST, LEVL IV, 30-39 MIN: ICD-10-PCS | Mod: S$PBB,,, | Performed by: NURSE PRACTITIONER

## 2022-01-06 PROCEDURE — 99999 PR PBB SHADOW E&M-EST. PATIENT-LVL III: ICD-10-PCS | Mod: PBBFAC,,, | Performed by: OPTOMETRIST

## 2022-01-06 PROCEDURE — 99214 OFFICE O/P EST MOD 30 MIN: CPT | Mod: S$PBB,,, | Performed by: NURSE PRACTITIONER

## 2022-01-06 PROCEDURE — 99999 PR PBB SHADOW E&M-EST. PATIENT-LVL V: CPT | Mod: PBBFAC,,, | Performed by: NURSE PRACTITIONER

## 2022-01-06 PROCEDURE — 99499 RISK ADDL DX/OHS AUDIT: ICD-10-PCS | Mod: S$PBB,,, | Performed by: OPTOMETRIST

## 2022-01-06 PROCEDURE — 99999 PR PBB SHADOW E&M-EST. PATIENT-LVL V: ICD-10-PCS | Mod: PBBFAC,,, | Performed by: NURSE PRACTITIONER

## 2022-01-06 PROCEDURE — 99215 OFFICE O/P EST HI 40 MIN: CPT | Mod: PBBFAC,PO | Performed by: NURSE PRACTITIONER

## 2022-01-06 PROCEDURE — 99213 OFFICE O/P EST LOW 20 MIN: CPT | Mod: PBBFAC,27,PO | Performed by: OPTOMETRIST

## 2022-01-06 PROCEDURE — 92004 COMPRE OPH EXAM NEW PT 1/>: CPT | Mod: S$PBB,,, | Performed by: OPTOMETRIST

## 2022-01-06 PROCEDURE — 92015 PR REFRACTION: ICD-10-PCS | Mod: ,,, | Performed by: OPTOMETRIST

## 2022-01-06 PROCEDURE — 99499 UNLISTED E&M SERVICE: CPT | Mod: S$PBB,,, | Performed by: OPTOMETRIST

## 2022-01-06 PROCEDURE — 92004 PR EYE EXAM, NEW PATIENT,COMPREHESV: ICD-10-PCS | Mod: S$PBB,,, | Performed by: OPTOMETRIST

## 2022-01-06 PROCEDURE — 99999 PR PBB SHADOW E&M-EST. PATIENT-LVL III: CPT | Mod: PBBFAC,,, | Performed by: OPTOMETRIST

## 2022-01-06 RX ORDER — CLOTRIMAZOLE AND BETAMETHASONE DIPROPIONATE 10; .64 MG/G; MG/G
CREAM TOPICAL 2 TIMES DAILY
Qty: 15 G | Refills: 0 | Status: SHIPPED | OUTPATIENT
Start: 2022-01-06 | End: 2024-03-20 | Stop reason: CLARIF

## 2022-01-06 NOTE — PATIENT INSTRUCTIONS
Patient Education       Fungal Skin Rash   About this topic   A fungal skin rash is a common infection that can happen to anyone and can be found anywhere on your body.  What are the causes?   A fungal infection is caused by a type of germ called a fungus that lives in warm, moist places.  What can make this more likely to happen?   You are more likely to have this kind of infection if you:  · Have poor hygiene or wear tight clothes  · Are in hot, humid places  · Use community gyms, showers, or baths  · Are overweight  · Have diabetes  · Have problems with your immune system  · Take some kinds of drugs  What are the main signs?   This kind of rash is most often very red in color and may be ring shaped. Your skin around the rash may be scaly or flaky and you may lose hair in this area. Your skin may itch, crack, or be sore. The rash may have small dots around the edges or have a white or yellowish discharge.  How does the doctor diagnose this health problem?   The doctor will ask you questions about your health history and do an exam. The doctor will look at your rash carefully and may scrape an area on top of your skin or nails. This will go to the lab and the staff will look for germs.  How does the doctor treat this health problem?   The doctor will want you to keep the area where the rash is clean and dry. You may have a cream, spray, or powder to put on the rash. It will be important to put on clean clothes each day.  What drugs may be needed?   The doctor may order drugs to:  · Treat the fungus  · Fight an infection  · Relieve itch  What can be done to prevent this health problem?   · Avoid walking barefoot. Wear sandals in public swim and shower areas.  · Wash armpits, groin, feet, and other areas of your body with soap and water each day. Dry fully.  · Wear clean underwear and socks each day.  · Wear clothes and shoes that help move moisture away from your skin.  · Use an antifungal powder in shoes.  · Keep  toenails cut short and straight across. Use different clippers for infected nails and healthy nails.  Where can I learn more?   Centers for Disease Control and Prevention  https://www.cdc.gov/fungal/diseases/ringworm/index.html   NHS Choices  https://www.nhs.uk/conditions/ringworm/   [facility] Choices  https://www.nhs.uk/conditions/thrush-in-men-and-women/   Last Reviewed Date   2021-05-14  Consumer Information Use and Disclaimer   This information is not specific medical advice and does not replace information you receive from your health care provider. This is only a brief summary of general information. It does NOT include all information about conditions, illnesses, injuries, tests, procedures, treatments, therapies, discharge instructions or life-style choices that may apply to you. You must talk with your health care provider for complete information about your health and treatment options. This information should not be used to decide whether or not to accept your health care providers advice, instructions or recommendations. Only your health care provider has the knowledge and training to provide advice that is right for you.  Copyright   Copyright © 2021 UpToDate, Inc. and its affiliates and/or licensors. All rights reserved.  Patient Education       Corns and Calluses   The Basics   Written by the doctors and editors at [employer]   What are corns and calluses? -- Corns and calluses are areas of thick, hard skin, usually on the hands or feet. They can form when something rubs or presses on the skin over time.  Corns usually affect the bottoms of the feet and sides of the toes. A corn looks like a small bump, and has a hard center surrounded by an area of irritated skin. Corns are often painful.  Calluses often form on the hands, fingers, feet, or toes (picture 2). They look like thick, rough, sometimes bumpy skin. Calluses usually do not hurt.  What causes corns and calluses? -- Corns and calluses can result  from:  · Wearing shoes that are too tight or too loose  · Wearing shoes without socks  · Walking around barefoot  · Using tools (like a hammer or rake) or sports equipment (like a tennis racket) that can rub against the skin  If you have other problems with your feet, you might be more likely to get corns or calluses. For example, if you have a bunion (a bony bump at the base of your big toe), your shoes can rub against it. This can cause a corn or callus.  Is there a test for corns and calluses? -- No. There is no test. But your doctor or nurse can tell if you have a corn or callus by looking at your skin. If your doctor or nurse thinks that your corns or calluses are caused by problems with the bones in your feet, you might need an X-ray.  Is there anything I can do on my own to get rid of corns and calluses? -- Yes. To help corns and calluses heal, and prevent new ones, you can:  · Wear shoes and socks that fit properly. Tight shoes or shoes with high heels can cause corns and calluses.  · Avoid going barefoot, or wearing shoes without socks.  · Use special pads inside your shoes to prevent rubbing.  Should I see a doctor or nurse? -- Maybe. If you think you have a corn or a callus and it is causing pain, see a doctor or nurse. They can check to see if you have a corn, callus, or something else (like a wart), and whether it needs treatment.  How are corns and calluses treated? -- If you have a corn or callus that causes pain or won't heal on its own, your doctor can treat it. Treatment usually involves removing the top layers of skin on the corn or callus, and then applying a patch that has medicine in it. The patch will soften the skin, so that more can be removed. After it has been in place for 2 to 3 days, the doctor can trim the skin again and replace the patch. This process can be repeated until the trouble spot is gone.  If you would like to treat yourself, you can buy the patches with medicine in them  "without a prescription (sample brand names: Curad Mediplast, Dr. Dillon's Callus Removers). Then your doctor can teach you how and when to change the patch yourself. But you should not use this treatment if you have diabetes or other conditions that can affect the nerves in the feet.  If your corns or calluses are severe or keep coming back, your doctor might refer you to a foot doctor, called a "podiatrist." A podiatrist can fit you with a special shoe insert (called an "orthotic") to help protect and cushion your feet.  Can corns and calluses be prevented? -- Sometimes. You are less likely to get corns or calluses if you avoid shoes that squeeze or rub against your feet or toes. You can also wear gloves to protect your hands when using tools that might rub on your skin (such as while gardening).  All topics are updated as new evidence becomes available and our peer review process is complete.  This topic retrieved from [employer] on: Sep 21, 2021.  Topic 58196 Version 5.0  Release: 29.4.2 - C29.263  © 2021 UpToDate, Inc. and/or its affiliates. All rights reserved.  picture 2: Calluses on the hands     This picture shows calluses on a person's hands. These form when something repeatedly rubs or presses on the skin. Over time, using gardening tools or sports equipment can cause calluses to appear on the hands.  Graphic 47821 Version 2.0    Consumer Information Use and Disclaimer   This information is not specific medical advice and does not replace information you receive from your health care provider. This is only a brief summary of general information. It does NOT include all information about conditions, illnesses, injuries, tests, procedures, treatments, therapies, discharge instructions or life-style choices that may apply to you. You must talk with your health care provider for complete information about your health and treatment options. This information should not be used to decide whether or not to accept " your health care provider's advice, instructions or recommendations. Only your health care provider has the knowledge and training to provide advice that is right for you. The use of this information is governed by the "[employer] End User License Agreement, available at https://www.[employer]/en/solutions/[employer]/about/jason.The use of [employer] content is governed by the [employer] Terms of Use. ©2021 UpToDate, Inc. All rights reserved.  Copyright   © 2021 UpToDate, Inc. and/or its affiliates. All rights reserved.

## 2022-01-06 NOTE — PROGRESS NOTES
Subjective:       Patient ID: Miguel Moore is a 66 y.o. male      Chief Complaint   Patient presents with    Concerns About Ocular Health    Diabetic Eye Exam     History of Present Illness  Dls: 9/22/20 Dr. Guerrero     65 y/o male presents today for diabetic eye exam.  Pt c/o blurry vision at distance and near ou.   Pt wears otc readers.      this am     No tearing  No itching  No burning  No pain  No ha's  + floaters  No flashes    Eye meds  None    Hemoglobin A1C       Date                     Value               Ref Range             Status                11/02/2021               5.9 (H)             4.0 - 5.6 %           Final                 08/13/2020               5.5                 4.0 - 5.6 %           Final                  01/17/2020               6.0 (H)             4.0 - 5.6 %           Final                 Assessment/Plan:     1. Type 2 diabetes mellitus without retinopathy  No diabetic retinopathy. Discussed with pt the effects of diabetes on vision, importance of good blood sugar control, compliance with meds, and follow up care with PCP. Return in 1 year for dilated eye exam, sooner PRN.    2. Pseudophakia  Tr PCO. Centered.     3. Refractive error  Educated patient on refractive error and discussed lens options. Dispensed updated spectacle Rx. Educated about adaptation period to new specs.    Eyeglass Final Rx     Eyeglass Final Rx       Sphere Cylinder Axis Add    Right +0.50 Sphere  +2.50    Left -0.25 +1.00 155 +2.50    Expiration Date: 1/7/2023                  Follow up in about 1 year (around 1/6/2023) for Diabetic Eye Exam.

## 2022-02-08 ENCOUNTER — PES CALL (OUTPATIENT)
Dept: ADMINISTRATIVE | Facility: CLINIC | Age: 67
End: 2022-02-08
Payer: MEDICARE

## 2022-02-08 ENCOUNTER — OFFICE VISIT (OUTPATIENT)
Dept: FAMILY MEDICINE | Facility: CLINIC | Age: 67
End: 2022-02-08
Payer: MEDICARE

## 2022-02-08 VITALS
SYSTOLIC BLOOD PRESSURE: 120 MMHG | TEMPERATURE: 98 F | HEART RATE: 72 BPM | WEIGHT: 175.69 LBS | OXYGEN SATURATION: 99 % | DIASTOLIC BLOOD PRESSURE: 76 MMHG | HEIGHT: 68 IN | BODY MASS INDEX: 26.63 KG/M2

## 2022-02-08 DIAGNOSIS — S61.309D NAIL AVULSION, FINGER, SUBSEQUENT ENCOUNTER: ICD-10-CM

## 2022-02-08 DIAGNOSIS — L60.9 NAIL ABNORMALITY: Primary | ICD-10-CM

## 2022-02-08 PROCEDURE — 99999 PR PBB SHADOW E&M-EST. PATIENT-LVL V: CPT | Mod: PBBFAC,,, | Performed by: FAMILY MEDICINE

## 2022-02-08 PROCEDURE — 99213 OFFICE O/P EST LOW 20 MIN: CPT | Mod: S$PBB,,, | Performed by: FAMILY MEDICINE

## 2022-02-08 PROCEDURE — 99999 PR PBB SHADOW E&M-EST. PATIENT-LVL V: ICD-10-PCS | Mod: PBBFAC,,, | Performed by: FAMILY MEDICINE

## 2022-02-08 PROCEDURE — 99215 OFFICE O/P EST HI 40 MIN: CPT | Mod: PBBFAC,PO | Performed by: FAMILY MEDICINE

## 2022-02-08 PROCEDURE — 99213 PR OFFICE/OUTPT VISIT, EST, LEVL III, 20-29 MIN: ICD-10-PCS | Mod: S$PBB,,, | Performed by: FAMILY MEDICINE

## 2022-02-08 NOTE — PROGRESS NOTES
"  Physical Exam  /76   Pulse 72   Temp 97.8 °F (36.6 °C) (Oral)   Ht 5' 8" (1.727 m)   Wt 79.7 kg (175 lb 11.3 oz)   SpO2 99%   BMI 26.72 kg/m²      Office Visit    Patient Name: Miguel Moore    : 1955  MRN: [id number]      Assessment/Plan:  Miguel Moore is a 66 y.o. male who presents today for :    Nail abnormality  -     Ambulatory referral/consult to Dermatology; Future; Expected date: 02/15/2022  Nail avulsion, finger, subsequent encounter  -     Ambulatory referral/consult to Dermatology; Future; Expected date: 02/15/2022  -f/u with Derm for further management      Follow up PRN      This note was created by combination of typed  and MModal dictation.  Transcription errors may be present.  If there are any questions, please contact me.        ----------------------------------------------------------------------------------------------------------------------      HPI:  Patient Care Team:  Raciel Raymond MD as PCP - General (Internal Medicine)  Gabriella Manjarrez DPM as Consulting Physician (Podiatry)  Mac Chambers Jr., MD as Consulting Physician (Urology)  Geovany Rucker MD as Consulting Physician (Neurology)  Bob Tay MD as Consulting Physician (Ophthalmology)  Tres Reyna MA as Care Coordinator  Adrian Millard MD as Consulting Physician (Nephrology)    Miguel is a 66 y.o. male with      Patient Active Problem List   Diagnosis    Benign essential HTN    Pure hypercholesterolemia    Controlled type 2 diabetes mellitus with chronic kidney disease on chronic dialysis, with long-term current use of insulin    BPH (benign prostatic hyperplasia) 21 prostate bx normal    Seizure disorder as sequela of cerebrovascular accident    Hemiplegia and hemiparesis following cerebral infarction affecting right dominant side    Microcytosis 2019 labs c/w AoCD and AoCKD    ESRD (end stage renal disease)    Nuclear sclerosis, left    Cortical " cataract of both eyes    DM type 2 without retinopathy    Secondary hyperparathyroidism of renal origin    Vitamin D deficiency    Right sided weakness    Senile nuclear sclerosis    CME (cystoid macular edema), bilateral    Refractive error    History of stroke    Type 2 diabetes mellitus with diabetic neuropathy, with long-term current use of insulin    Nephrotic syndrome    Anasarca    Hypocalcemia    Candida parapsilosis infection while hospitalized 8/2020    Acute deep vein thrombosis (DVT) of popliteal vein of right lower extremity on outside E US 9/21/2020, unprovoked; eliquis    Pancytopenia    Elevated liver enzymes    Pulmonary nodule 1/2021 4 mm nodule.    Hematuria    Elevated PSA 2/18/21 prostate bx normal    Anemia in chronic kidney disease    Heel ulceration, right, with fat layer exposed    Pseudophakia     Patient is new to me and has PMHx as above presents today for follow up of abnormal nail growth. He was seen by his PCP two months ago for L middle finger nail avulsion after he jammed his finger against a table. Since then, his nail has grown back but is very thick and clumpy. He denies any pain but states that he would like to get it removed as it makes him uncomfortable. He denies swelling/redness/tenderness of his L middle finger. Otherwise, no other acute issues during this visit.          Additional ROS    Negative review of system  except per HPI             Current Medications  Medications reviewed and updated.       Current Outpatient Medications:     amLODIPine (NORVASC) 10 MG tablet, Take 1 tablet (10 mg total) by mouth once daily., Disp: 90 tablet, Rfl: 3    apixaban (ELIQUIS) 5 mg Tab, Take 1 tablet (5 mg total) by mouth 2 (two) times daily., Disp: 180 tablet, Rfl: 3    atorvastatin (LIPITOR) 80 MG tablet, Take 1 tablet (80 mg total) by mouth once daily., Disp: 90 tablet, Rfl: 3    blood sugar diagnostic Strp, To check BG 1 times daily, to use with  insurance preferred meter, Disp: 100 strip, Rfl: 3    blood-glucose meter kit, To check BG 1 times daily, to use with insurance preferred meter, Disp: 1 each, Rfl: 0    calcitRIOL (ROCALTROL) 0.25 MCG Cap, Take 1 capsule (0.25 mcg total) by mouth once daily., Disp:  , Rfl:     cholecalciferol, vitamin D3, (VITAMIN D3) 25 mcg (1,000 unit) capsule, Take 1 capsule (1,000 Units total) by mouth once daily., Disp: 90 capsule, Rfl: 3    clopidogreL (PLAVIX) 75 mg tablet, Take 1 tablet (75 mg total) by mouth once daily., Disp: 90 tablet, Rfl: 3    clotrimazole-betamethasone 1-0.05% (LOTRISONE) cream, Apply topically 2 (two) times daily., Disp: 15 g, Rfl: 0    cyproheptadine (PERIACTIN) 4 mg tablet, , Disp: , Rfl:     ergocalciferol, vitamin D2, (VITAMIN D ORAL), Take 1 mcg by mouth., Disp: , Rfl:     ferrous gluconate 324 mg (37.5 mg iron) Tab tablet, Take 1 tablet (324 mg total) by mouth 2 (two) times daily with meals., Disp: 60 tablet, Rfl: 11    finasteride (PROSCAR) 5 mg tablet, Take 1 tablet (5 mg total) by mouth once daily., Disp: 30 tablet, Rfl: 11    folic acid (FOLVITE) 1 MG tablet, Take 1 tablet (1 mg total) by mouth once daily., Disp: 30 tablet, Rfl: 0    heparin sodium,porcine (HEPARIN, PORCINE,) 1,000 unit/mL Soln injection, by Intra-Catheter route., Disp: , Rfl:     hydrALAZINE (APRESOLINE) 25 MG tablet, Take 1 tablet (25 mg total) by mouth every 12 (twelve) hours., Disp: 180 tablet, Rfl: 3    hydrocortisone (WESTCORT) 0.2 % cream, Apply topically 2 (two) times daily., Disp: 15 g, Rfl: 0    labetaloL (NORMODYNE) 100 MG tablet, Take 1 tablet (100 mg total) by mouth every 12 (twelve) hours., Disp: 180 tablet, Rfl: 3    lancets Misc, To check BG 1 times daily, to use with insurance preferred meter, Disp: 100 each, Rfl: 4    levETIRAcetam (KEPPRA) 250 MG Tab, Take 1 tablet (250 mg total) by mouth 2 (two) times daily on Tuesday, Thursday, Saturday & Sunday. Take 2 tablets (500mg total) by mouth in  "the morning and 1 tablet (250mg total) in the evening on dialysis days (Monday, Wednesday, & Friday), Disp: 80 tablet, Rfl: 11    pen needle, diabetic 31 gauge x 1/4" Ndle, For mealtime insulin, Disp: 300 each, Rfl: 11    RENVELA 800 mg Tab, Take 1 tablet (800 mg total) by mouth 3 (three) times daily with meals., Disp: 90 tablet, Rfl: 3    sodium chloride 0.9% SolP 1,000 mL with heparin (porcine) 10,000 unit/mL Soln 90,000 Units, by Intra-Catheter route., Disp: , Rfl:     tamsulosin (FLOMAX) 0.4 mg Cap, Take 2 capsules (0.8 mg total) by mouth once daily., Disp: 180 capsule, Rfl: 3    Past Surgical History:   Procedure Laterality Date    CATARACT EXTRACTION W/  INTRAOCULAR LENS IMPLANT Right 10/05/2017    Dr. Tay    CATARACT EXTRACTION W/  INTRAOCULAR LENS IMPLANT Left 10/19/2017    Dr. Tay    CYSTOSCOPY W/ RETROGRADES Bilateral 2/18/2021    Procedure: CYSTOSCOPY, WITH RETROGRADE PYELOGRAM;  Surgeon: JEMMA Styles MD;  Location: [facility] OR;  Service: Urology;  Laterality: Bilateral;  REQUESTING EARLY AS POSSIBE-LO / 2/8/2021 @ 1:13PM  RN Pre Op 2-11-21, Covid NEGATIVE ON  2-17-21.  C A    ESOPHAGOGASTRODUODENOSCOPY N/A 8/17/2020    Procedure: EGD (ESOPHAGOGASTRODUODENOSCOPY);  Surgeon: Desmond Chapman MD;  Location: [facility] ENDO;  Service: Endoscopy;  Laterality: N/A;    EYE SURGERY Bilateral     cataract    FEMORAL ARTERY STENT      FRACTURE SURGERY      KNEE SURGERY      bilateral scope       Family History   Problem Relation Age of Onset    Diabetes Mother     Heart disease Mother     Hypertension Mother     Cataracts Mother     No Known Problems Father     Hypertension Sister     No Known Problems Brother     No Known Problems Daughter     No Known Problems Maternal Aunt     No Known Problems Maternal Uncle     No Known Problems Paternal Aunt     No Known Problems Paternal Uncle     No Known Problems Maternal Grandmother     No Known Problems Maternal Grandfather     No " "Known Problems Paternal Grandmother     No Known Problems Paternal Grandfather     Amblyopia Neg Hx     Blindness Neg Hx     Cancer Neg Hx     Glaucoma Neg Hx     Macular degeneration Neg Hx     Retinal detachment Neg Hx     Strabismus Neg Hx     Stroke Neg Hx     Thyroid disease Neg Hx        Social History     Socioeconomic History    Marital status: Single   Occupational History    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, etc   Tobacco Use    Smoking status: Never Smoker    Smokeless tobacco: Never Used   Substance and Sexual Activity    Alcohol use: No    Drug use: No   Social History Narrative    Lives with daughter           Allergies   Review of patient's allergies indicates:   Allergen Reactions    Ace inhibitors      Hyperkalemia 8/2018  Other reaction(s): Unknown    Penicillins Hives    Tizanidine      Felt hot             Review of Systems  See HPI      [unfilled]  /76   Pulse 72   Temp 97.8 °F (36.6 °C) (Oral)   Ht 5' 8" (1.727 m)   Wt 79.7 kg (175 lb 11.3 oz)   SpO2 99%   BMI 26.72 kg/m²     GEN: NAD, pleasant  HEENT: NCAT, PERRLA, EOMI, sclera clear, anicteric  SKIN: normal turgor, L 3rd digit nail with disordered growth, no TTP nor surrouding edema/erythema. Normal ROM of PCP/MCP joint.  PSYCH: AOx3              "

## 2022-03-08 ENCOUNTER — OFFICE VISIT (OUTPATIENT)
Dept: PODIATRY | Facility: CLINIC | Age: 67
End: 2022-03-08
Payer: MEDICARE

## 2022-03-08 VITALS — HEIGHT: 68 IN | WEIGHT: 175.69 LBS | BODY MASS INDEX: 26.63 KG/M2

## 2022-03-08 DIAGNOSIS — B35.1 ONYCHOMYCOSIS DUE TO DERMATOPHYTE: ICD-10-CM

## 2022-03-08 DIAGNOSIS — E11.49 TYPE II DIABETES MELLITUS WITH NEUROLOGICAL MANIFESTATIONS: Primary | ICD-10-CM

## 2022-03-08 PROCEDURE — 99499 RISK ADDL DX/OHS AUDIT: ICD-10-PCS | Mod: S$PBB,,, | Performed by: PODIATRIST

## 2022-03-08 PROCEDURE — 11721 DEBRIDE NAIL 6 OR MORE: CPT | Mod: Q9,PBBFAC,PO | Performed by: PODIATRIST

## 2022-03-08 PROCEDURE — 99499 UNLISTED E&M SERVICE: CPT | Mod: S$PBB,,, | Performed by: PODIATRIST

## 2022-03-08 PROCEDURE — 11721 DEBRIDE NAIL 6 OR MORE: CPT | Mod: Q9,S$PBB,, | Performed by: PODIATRIST

## 2022-03-08 PROCEDURE — 99999 PR PBB SHADOW E&M-EST. PATIENT-LVL III: ICD-10-PCS | Mod: PBBFAC,,, | Performed by: PODIATRIST

## 2022-03-08 PROCEDURE — 99213 OFFICE O/P EST LOW 20 MIN: CPT | Mod: PBBFAC,PO | Performed by: PODIATRIST

## 2022-03-08 PROCEDURE — 99999 PR PBB SHADOW E&M-EST. PATIENT-LVL III: CPT | Mod: PBBFAC,,, | Performed by: PODIATRIST

## 2022-03-08 PROCEDURE — 11721 PR DEBRIDEMENT OF NAILS, 6 OR MORE: ICD-10-PCS | Mod: Q9,S$PBB,, | Performed by: PODIATRIST

## 2022-03-08 NOTE — PROGRESS NOTES
Subjective:      Patient ID: Miguel Moore is a 66 y.o. male.    Chief Complaint: Diabetes Mellitus (12/9/21 Dr Raymond PCP) and Nail Care    Miguel is a 66 y.o. male who presents to the clinic for evaluation and treatment of high risk feet. Miguel has a past medical history of Allergy, Clotting disorder, Deep vein thrombosis, Diabetes mellitus, type 2, Hypertension, Nuclear sclerosis of both eyes (6/9/2017), Renal disorder, Seizures, Stroke, and Urinary tract infection. The patient's chief complaint is long, thick toenails on both feet and calluses on toes of left foot. Routine trimming of these help keep symptoms under control. This patient has documented high risk feet requiring routine maintenance secondary to diabetes mellitis and those secondary complications of diabetes, as mentioned. No other major complaints today. Has hx of stroke and right sided weakness.Presents for diabetic foot risk assessment. No new issues.          PCP: Raciel Raymond MD    Date Last Seen by PCP:   Chief Complaint   Patient presents with    Diabetes Mellitus     12/9/21 Dr Raymond PCP    Nail Care         Current shoe gear:   Tennis shoes         Hemoglobin A1C   Date Value Ref Range Status   11/02/2021 5.9 (H) 4.0 - 5.6 % Final     Comment:     ADA Screening Guidelines:  5.7-6.4%  Consistent with prediabetes  >or=6.5%  Consistent with diabetes    High levels of fetal hemoglobin interfere with the HbA1C  assay. Heterozygous hemoglobin variants (HbS, HgC, etc)do  not significantly interfere with this assay.   However, presence of multiple variants may affect accuracy.     08/13/2020 5.5 4.0 - 5.6 % Final     Comment:     ADA Screening Guidelines:  5.7-6.4%  Consistent with prediabetes  >or=6.5%  Consistent with diabetes  High levels of fetal hemoglobin interfere with the HbA1C  assay. Heterozygous hemoglobin variants (HbS, HgC, etc)do  not significantly interfere with this assay.   However, presence of multiple variants may  affect accuracy.     01/17/2020 6.0 (H) 4.0 - 5.6 % Final     Comment:     ADA Screening Guidelines:  5.7-6.4%  Consistent with prediabetes  >or=6.5%  Consistent with diabetes  High levels of fetal hemoglobin interfere with the HbA1C  assay. Heterozygous hemoglobin variants (HbS, HgC, etc)do  not significantly interfere with this assay.   However, presence of multiple variants may affect accuracy.       Past Medical History:   Diagnosis Date    Allergy     Clotting disorder     Elaquis and APlavix    Deep vein thrombosis     Diabetes mellitus, type 2     Hypertension     Nuclear sclerosis of both eyes 6/9/2017    Renal disorder     Seizures     Stroke     Urinary tract infection        Past Surgical History:   Procedure Laterality Date    CATARACT EXTRACTION W/  INTRAOCULAR LENS IMPLANT Right 10/05/2017    Dr. Tay    CATARACT EXTRACTION W/  INTRAOCULAR LENS IMPLANT Left 10/19/2017    Dr. Tay    CYSTOSCOPY W/ RETROGRADES Bilateral 2/18/2021    Procedure: CYSTOSCOPY, WITH RETROGRADE PYELOGRAM;  Surgeon: JEMMA Styles MD;  Location: [facility] OR;  Service: Urology;  Laterality: Bilateral;  REQUESTING EARLY AS POSSIBE-LO / 2/8/2021 @ 1:13PM  RN Pre Op 2-11-21, Covid NEGATIVE ON  2-17-21.  C A    ESOPHAGOGASTRODUODENOSCOPY N/A 8/17/2020    Procedure: EGD (ESOPHAGOGASTRODUODENOSCOPY);  Surgeon: Desmond Chapman MD;  Location: [facility];  Service: Endoscopy;  Laterality: N/A;    EYE SURGERY Bilateral     cataract    FEMORAL ARTERY STENT      FRACTURE SURGERY      KNEE SURGERY      bilateral scope       Family History   Problem Relation Age of Onset    Diabetes Mother     Heart disease Mother     Hypertension Mother     Cataracts Mother     No Known Problems Father     Hypertension Sister     No Known Problems Brother     No Known Problems Daughter     No Known Problems Maternal Aunt     No Known Problems Maternal Uncle     No Known Problems Paternal Aunt     No Known Problems  Paternal Uncle     No Known Problems Maternal Grandmother     No Known Problems Maternal Grandfather     No Known Problems Paternal Grandmother     No Known Problems Paternal Grandfather     Amblyopia Neg Hx     Blindness Neg Hx     Cancer Neg Hx     Glaucoma Neg Hx     Macular degeneration Neg Hx     Retinal detachment Neg Hx     Strabismus Neg Hx     Stroke Neg Hx     Thyroid disease Neg Hx        Social History     Socioeconomic History    Marital status: Single   Occupational History    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, etc   Tobacco Use    Smoking status: Never Smoker    Smokeless tobacco: Never Used   Substance and Sexual Activity    Alcohol use: No    Drug use: No   Social History Narrative    Lives with daughter       Current Outpatient Medications   Medication Sig Dispense Refill    amLODIPine (NORVASC) 10 MG tablet Take 1 tablet (10 mg total) by mouth once daily. 90 tablet 3    atorvastatin (LIPITOR) 80 MG tablet Take 1 tablet (80 mg total) by mouth once daily. 90 tablet 3    blood sugar diagnostic Strp To check BG 1 times daily, to use with insurance preferred meter 100 strip 3    blood-glucose meter kit To check BG 1 times daily, to use with insurance preferred meter 1 each 0    calcitRIOL (ROCALTROL) 0.25 MCG Cap Take 1 capsule (0.25 mcg total) by mouth once daily.      cholecalciferol, vitamin D3, (VITAMIN D3) 25 mcg (1,000 unit) capsule Take 1 capsule (1,000 Units total) by mouth once daily. 90 capsule 3    clopidogreL (PLAVIX) 75 mg tablet Take 1 tablet (75 mg total) by mouth once daily. 90 tablet 3    clotrimazole-betamethasone 1-0.05% (LOTRISONE) cream Apply topically 2 (two) times daily. 15 g 0    cyproheptadine (PERIACTIN) 4 mg tablet       ELIQUIS 5 mg Tab Take 1 tablet by mouth twice daily 180 tablet 0    ergocalciferol, vitamin D2, (VITAMIN D ORAL) Take 1 mcg by mouth.      heparin sodium,porcine (HEPARIN, PORCINE,) 1,000 unit/mL Soln  "injection by Intra-Catheter route.      hydrALAZINE (APRESOLINE) 25 MG tablet Take 1 tablet (25 mg total) by mouth every 12 (twelve) hours. 180 tablet 3    hydrocortisone (WESTCORT) 0.2 % cream Apply topically 2 (two) times daily. 15 g 0    labetaloL (NORMODYNE) 100 MG tablet Take 1 tablet (100 mg total) by mouth every 12 (twelve) hours. 180 tablet 3    lancets Misc To check BG 1 times daily, to use with insurance preferred meter 100 each 4    levETIRAcetam (KEPPRA) 250 MG Tab Take 1 tablet (250 mg total) by mouth 2 (two) times daily on Tuesday, Thursday, Saturday & Sunday. Take 2 tablets (500mg total) by mouth in the morning and 1 tablet (250mg total) in the evening on dialysis days (Monday, Wednesday, & Friday) 80 tablet 11    pen needle, diabetic 31 gauge x 1/4" Ndle For mealtime insulin 300 each 11    RENVELA 800 mg Tab Take 1 tablet (800 mg total) by mouth 3 (three) times daily with meals. 90 tablet 3    sodium chloride 0.9% SolP 1,000 mL with heparin (porcine) 10,000 unit/mL Soln 90,000 Units by Intra-Catheter route.      tamsulosin (FLOMAX) 0.4 mg Cap Take 2 capsules (0.8 mg total) by mouth once daily. 180 capsule 3    ferrous gluconate 324 mg (37.5 mg iron) Tab tablet Take 1 tablet (324 mg total) by mouth 2 (two) times daily with meals. 60 tablet 11    finasteride (PROSCAR) 5 mg tablet Take 1 tablet (5 mg total) by mouth once daily. 30 tablet 11    folic acid (FOLVITE) 1 MG tablet Take 1 tablet (1 mg total) by mouth once daily. 30 tablet 0     No current facility-administered medications for this visit.       Review of patient's allergies indicates:   Allergen Reactions    Penicillins Hives       Review of Systems   Constitutional: Negative for chills and fever.   Cardiovascular: Positive for leg swelling. Negative for chest pain and claudication.   Respiratory: Negative for cough and shortness of breath.    Skin: Positive for dry skin and suspicious lesions (corns/callus).   Musculoskeletal: " Positive for muscle weakness.   Gastrointestinal: Negative for nausea and vomiting.   Neurological: Positive for focal weakness (right sided), numbness and sensory change. Negative for paresthesias.   Psychiatric/Behavioral: Negative for altered mental status.           Objective:      Physical Exam  Vitals reviewed.   Constitutional:       Appearance: He is well-developed.   HENT:      Head: Normocephalic.   Cardiovascular:      Pulses:           Dorsalis pedis pulses are 2+ on the right side and 2+ on the left side.        Posterior tibial pulses are 1+ on the right side and 1+ on the left side.      Comments: CRT < 3 sec to tips of toes. 2+ pitting edema noted to b/l LE. No vericosities noted to b/l LEs.     Pulmonary:      Effort: No respiratory distress.   Musculoskeletal:      Comments: Gastrocnemius equinus noted to b/l ankles with decreased DF noted on exam. MMT 5/5 in DF/PF/Inv/Ev resistance with no reproduction of pain in any direction Right, 3/5 left. Passive range of motion of ankle and pedal joints is painless. Adequate pedal joint ROM.     Rigid hammertoe contractures noted to toes 2-4 R-asymptomatic.     Limited hallux MTPJ ROM with plantarflexion contracture of b/l hallux IPJ, rigid R semi-reducible L.    Skin:     General: Skin is warm and dry.      Findings: Lesion present. No ecchymosis or erythema.      Comments: No open lesions, lacerations or wounds noted. Nails are dystrophic, elongated, thickened with yellow/brown discoloration to R 1 and L 1-5. Remaining toenails normotrophic.  Interdigital spaces clean, dry and intact b/l. No erythema noted to b/l foot. Skin texture thin, shiny, atrophic. Pedal hair absent. Toes cool to touch b/l.     Pre ulcerative callus left heel.    Neurological:      Mental Status: He is alert and oriented to person, place, and time.      Sensory: Sensory deficit present.      Comments: Light touch, proprioception, and sharp/dull sensation are all intact bilaterally.  Protective threshold with the [place]-Wienstein monofilament is intact bilaterally. Vibratory sensation diminished b/l distal foot.      Psychiatric:         Behavior: Behavior normal.         Thought Content: Thought content normal.         Judgment: Judgment normal.       Assessment:       Encounter Diagnoses   Name Primary?    Type II diabetes mellitus with neurological manifestations Yes    Onychomycosis due to dermatophyte          Plan:       Miguel was seen today for diabetes mellitus and nail care.    Diagnoses and all orders for this visit:    Type II diabetes mellitus with neurological manifestations    Onychomycosis due to dermatophyte      I counseled the patient on his conditions, their implications and medical management.    - Shoe inspection. Diabetic Foot Education. Patient reminded of the importance of good nutrition and blood sugar control to help prevent podiatric complications of diabetes. Patient instructed on proper foot hygeine. We discussed wearing proper shoe gear, daily foot inspections, never walking without protective shoe gear, never putting sharp instruments to feet, routine podiatric nail visits every 2-3 months.      Discussed regular and routine moisturizer to skin of both feet to help improve dry skin. Advised to apply twice daily until resolution of symptoms. Avoid between toes. Recommended Gold Bond Foot cream or Diabetic cream.      - With patient's permission, nails were aggressively reduced and debrided x 10 to their soft tissue attachment mechanically, removing all offending nail and debris. Patient relates relief following the procedure. He will continue to monitor the areas daily, inspect his feet, wear protective shoe gear when ambulatory, moisturizer to maintain skin integrity and follow in this office in approximately 2-3 months, sooner p.r.n.       Sabi Douglas DPM

## 2022-03-12 ENCOUNTER — DOCUMENT SCAN (OUTPATIENT)
Dept: HOME HEALTH SERVICES | Facility: HOSPITAL | Age: 67
End: 2022-03-12
Payer: MEDICARE

## 2022-03-28 ENCOUNTER — PES CALL (OUTPATIENT)
Dept: ADMINISTRATIVE | Facility: CLINIC | Age: 67
End: 2022-03-28
Payer: MEDICARE

## 2022-03-29 ENCOUNTER — TELEPHONE (OUTPATIENT)
Dept: FAMILY MEDICINE | Facility: CLINIC | Age: 67
End: 2022-03-29
Payer: MEDICARE

## 2022-03-29 NOTE — TELEPHONE ENCOUNTER
Return call to patient. Patient said that he was returning a call to this number that was a miss call on his phone. Patient did not check the answering machine to see. Will do this and address miss call as according.

## 2022-03-29 NOTE — TELEPHONE ENCOUNTER
----- Message from Jacquelyn Crane sent at 3/29/2022  8:26 AM CDT -----  Regarding: Return call    Type:  Patient Returning Call    Who Called: TRACI CHAO [[id number]]    Who Left Message for Patient: unknown     Does the patient know what this is regarding?: N    Can the clinic reply in [facility]: No    Best Call Back Number: [phone]    Additional Information: N/A

## 2022-04-27 ENCOUNTER — TELEPHONE (OUTPATIENT)
Dept: FAMILY MEDICINE | Facility: CLINIC | Age: 67
End: 2022-04-27
Payer: MEDICARE

## 2022-04-27 NOTE — TELEPHONE ENCOUNTER
Spoke with the Patient and scheduled Enhanced Annual Wellness Visit (EAWV) on 05/05/22 @ 10:00am.  Patient verbally understands.

## 2022-05-02 ENCOUNTER — TELEPHONE (OUTPATIENT)
Dept: FAMILY MEDICINE | Facility: CLINIC | Age: 67
End: 2022-05-02
Payer: MEDICARE

## 2022-05-02 DIAGNOSIS — Z79.4 TYPE 2 DIABETES MELLITUS WITH DIABETIC NEUROPATHY, WITH LONG-TERM CURRENT USE OF INSULIN: Primary | ICD-10-CM

## 2022-05-02 DIAGNOSIS — E11.40 TYPE 2 DIABETES MELLITUS WITH DIABETIC NEUROPATHY, WITH LONG-TERM CURRENT USE OF INSULIN: Primary | ICD-10-CM

## 2022-05-02 NOTE — TELEPHONE ENCOUNTER
----- Message from Bushra Chancefield sent at 5/2/2022 10:02 AM CDT -----  Who Called: TRACI CHAO    What is the request in detail: Requesting an order for diabetic shoes. Please advise.     Can the clinic reply by [facility]?: No    Best Call Back Number: [phone]

## 2022-05-04 ENCOUNTER — TELEPHONE (OUTPATIENT)
Dept: FAMILY MEDICINE | Facility: CLINIC | Age: 67
End: 2022-05-04
Payer: MEDICARE

## 2022-05-04 ENCOUNTER — TELEPHONE (OUTPATIENT)
Dept: ADMINISTRATIVE | Facility: CLINIC | Age: 67
End: 2022-05-04
Payer: MEDICARE

## 2022-05-04 NOTE — TELEPHONE ENCOUNTER
Patient stated that he would like to get some diabetic shoes. He believes its been over a year since he got his last pair. Informed patient to contact his podiatrist regarding getting diabetic shoes.

## 2022-05-04 NOTE — TELEPHONE ENCOUNTER
----- Message from Tonie Watson sent at 5/4/2022 11:03 AM CDT -----  Type: Patient Call Back    Who called:Self    What is the request in detail: Wants to make sure shoes are ready to be picked up    Can the clinic reply by [facility]?no    Would the patient rather a call back or a response via [facility]?call    Best call back number: ...[phone]

## 2022-05-05 ENCOUNTER — TELEPHONE (OUTPATIENT)
Dept: PODIATRY | Facility: CLINIC | Age: 67
End: 2022-05-05
Payer: MEDICARE

## 2022-05-05 DIAGNOSIS — M20.40 HAMMER TOE, UNSPECIFIED LATERALITY: ICD-10-CM

## 2022-05-05 DIAGNOSIS — E11.49 TYPE II DIABETES MELLITUS WITH NEUROLOGICAL MANIFESTATIONS: Primary | ICD-10-CM

## 2022-05-05 NOTE — TELEPHONE ENCOUNTER
Attempted to contact the pt regarding his shoe prescription. Pt did not answer a detailed message was left.

## 2022-05-05 NOTE — TELEPHONE ENCOUNTER
----- Message from Sussy Elizabeth sent at 5/4/2022  1:12 PM CDT -----  Type: Patient Call Back    Who called: self     What is the request in detail: Pt is requesting diabetic shoes please call     Can the clinic reply by [facility]?    Would the patient rather a call back or a response via [facility]? Call     Best call back number: [phone]

## 2022-05-09 ENCOUNTER — TELEPHONE (OUTPATIENT)
Dept: ADMINISTRATIVE | Facility: CLINIC | Age: 67
End: 2022-05-09
Payer: MEDICARE

## 2022-05-12 ENCOUNTER — PES CALL (OUTPATIENT)
Dept: ADMINISTRATIVE | Facility: CLINIC | Age: 67
End: 2022-05-12
Payer: MEDICARE

## 2022-05-17 DIAGNOSIS — I82.431 ACUTE DEEP VEIN THROMBOSIS (DVT) OF POPLITEAL VEIN OF RIGHT LOWER EXTREMITY: ICD-10-CM

## 2022-05-18 RX ORDER — APIXABAN 5 MG/1
TABLET, FILM COATED ORAL
Qty: 180 TABLET | Refills: 0 | Status: SHIPPED | OUTPATIENT
Start: 2022-05-18 | End: 2022-08-23

## 2022-06-06 ENCOUNTER — PES CALL (OUTPATIENT)
Dept: ADMINISTRATIVE | Facility: CLINIC | Age: 67
End: 2022-06-06
Payer: MEDICARE

## 2022-06-07 ENCOUNTER — OFFICE VISIT (OUTPATIENT)
Dept: PODIATRY | Facility: CLINIC | Age: 67
End: 2022-06-07
Payer: MEDICARE

## 2022-06-07 VITALS — BODY MASS INDEX: 26.63 KG/M2 | HEIGHT: 68 IN | WEIGHT: 175.69 LBS

## 2022-06-07 DIAGNOSIS — M20.40 HAMMER TOE, UNSPECIFIED LATERALITY: ICD-10-CM

## 2022-06-07 DIAGNOSIS — E11.49 TYPE II DIABETES MELLITUS WITH NEUROLOGICAL MANIFESTATIONS: Primary | ICD-10-CM

## 2022-06-07 PROCEDURE — 11721 DEBRIDE NAIL 6 OR MORE: CPT | Mod: Q9,S$GLB,, | Performed by: PODIATRIST

## 2022-06-07 PROCEDURE — 99499 UNLISTED E&M SERVICE: CPT | Mod: S$GLB,,, | Performed by: PODIATRIST

## 2022-06-07 PROCEDURE — 99999 PR PBB SHADOW E&M-EST. PATIENT-LVL III: ICD-10-PCS | Mod: PBBFAC,,, | Performed by: PODIATRIST

## 2022-06-07 PROCEDURE — 11721 DEBRIDE NAIL 6 OR MORE: CPT | Mod: Q9,PBBFAC,PO | Performed by: PODIATRIST

## 2022-06-07 PROCEDURE — 99213 OFFICE O/P EST LOW 20 MIN: CPT | Mod: PBBFAC,PO | Performed by: PODIATRIST

## 2022-06-07 PROCEDURE — 11721 PR DEBRIDEMENT OF NAILS, 6 OR MORE: ICD-10-PCS | Mod: Q9,S$GLB,, | Performed by: PODIATRIST

## 2022-06-07 PROCEDURE — 99999 PR PBB SHADOW E&M-EST. PATIENT-LVL III: CPT | Mod: PBBFAC,,, | Performed by: PODIATRIST

## 2022-06-07 PROCEDURE — 99499 RISK ADDL DX/OHS AUDIT: ICD-10-PCS | Mod: S$GLB,,, | Performed by: PODIATRIST

## 2022-06-15 NOTE — PROGRESS NOTES
Subjective:      Patient ID: Miguel Moore is a 66 y.o. male.    Chief Complaint: Diabetes Mellitus (1/6/22 NP [place]) and Nail Care    Miguel is a 66 y.o. male who presents to the clinic for evaluation and treatment of high risk feet. Miguel has a past medical history of Allergy, Clotting disorder, Deep vein thrombosis, Diabetes mellitus, type 2, Hypertension, Nuclear sclerosis of both eyes (6/9/2017), Renal disorder, Seizures, Stroke, and Urinary tract infection. The patient's chief complaint is long, thick toenails on both feet and calluses on toes of left foot. Routine trimming of these help keep symptoms under control. This patient has documented high risk feet requiring routine maintenance secondary to diabetes mellitis and those secondary complications of diabetes, as mentioned. No other major complaints today. Has hx of stroke and right sided weakness.Presents for diabetic foot risk assessment. No new issues. Reports elongated nails that are difficult to trim.           PCP: Raciel Raymond MD    Date Last Seen by PCP:   Chief Complaint   Patient presents with    Diabetes Mellitus     1/6/22 SHAYLA Higginbotham    Nail Care         Current shoe gear:   Tennis shoes         Hemoglobin A1C   Date Value Ref Range Status   11/02/2021 5.9 (H) 4.0 - 5.6 % Final     Comment:     ADA Screening Guidelines:  5.7-6.4%  Consistent with prediabetes  >or=6.5%  Consistent with diabetes    High levels of fetal hemoglobin interfere with the HbA1C  assay. Heterozygous hemoglobin variants (HbS, HgC, etc)do  not significantly interfere with this assay.   However, presence of multiple variants may affect accuracy.     08/13/2020 5.5 4.0 - 5.6 % Final     Comment:     ADA Screening Guidelines:  5.7-6.4%  Consistent with prediabetes  >or=6.5%  Consistent with diabetes  High levels of fetal hemoglobin interfere with the HbA1C  assay. Heterozygous hemoglobin variants (HbS, HgC, etc)do  not significantly interfere with this assay.    However, presence of multiple variants may affect accuracy.     01/17/2020 6.0 (H) 4.0 - 5.6 % Final     Comment:     ADA Screening Guidelines:  5.7-6.4%  Consistent with prediabetes  >or=6.5%  Consistent with diabetes  High levels of fetal hemoglobin interfere with the HbA1C  assay. Heterozygous hemoglobin variants (HbS, HgC, etc)do  not significantly interfere with this assay.   However, presence of multiple variants may affect accuracy.       Past Medical History:   Diagnosis Date    Allergy     Clotting disorder     Elaquis and APlavix    Deep vein thrombosis     Diabetes mellitus, type 2     Hypertension     Nuclear sclerosis of both eyes 6/9/2017    Renal disorder     Seizures     Stroke     Urinary tract infection        Past Surgical History:   Procedure Laterality Date    CATARACT EXTRACTION W/  INTRAOCULAR LENS IMPLANT Right 10/05/2017    Dr. Tay    CATARACT EXTRACTION W/  INTRAOCULAR LENS IMPLANT Left 10/19/2017    Dr. Tay    CYSTOSCOPY W/ RETROGRADES Bilateral 2/18/2021    Procedure: CYSTOSCOPY, WITH RETROGRADE PYELOGRAM;  Surgeon: JEMMA Styles MD;  Location: [facility] OR;  Service: Urology;  Laterality: Bilateral;  REQUESTING EARLY AS POSSIBE-LO / 2/8/2021 @ 1:13PM  RN Pre Op 2-11-21, Covid NEGATIVE ON  2-17-21.  C A    ESOPHAGOGASTRODUODENOSCOPY N/A 8/17/2020    Procedure: EGD (ESOPHAGOGASTRODUODENOSCOPY);  Surgeon: Desmond Chapman MD;  Location: [facility] ENDO;  Service: Endoscopy;  Laterality: N/A;    EYE SURGERY Bilateral     cataract    FEMORAL ARTERY STENT      FRACTURE SURGERY      KNEE SURGERY      bilateral scope       Family History   Problem Relation Age of Onset    Diabetes Mother     Heart disease Mother     Hypertension Mother     Cataracts Mother     No Known Problems Father     Hypertension Sister     No Known Problems Brother     No Known Problems Daughter     No Known Problems Maternal Aunt     No Known Problems Maternal Uncle     No Known  Problems Paternal Aunt     No Known Problems Paternal Uncle     No Known Problems Maternal Grandmother     No Known Problems Maternal Grandfather     No Known Problems Paternal Grandmother     No Known Problems Paternal Grandfather     Amblyopia Neg Hx     Blindness Neg Hx     Cancer Neg Hx     Glaucoma Neg Hx     Macular degeneration Neg Hx     Retinal detachment Neg Hx     Strabismus Neg Hx     Stroke Neg Hx     Thyroid disease Neg Hx        Social History     Socioeconomic History    Marital status: Single   Occupational History    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, etc   Tobacco Use    Smoking status: Never Smoker    Smokeless tobacco: Never Used   Substance and Sexual Activity    Alcohol use: No    Drug use: No   Social History Narrative    Lives with daughter       Current Outpatient Medications   Medication Sig Dispense Refill    amLODIPine (NORVASC) 10 MG tablet Take 1 tablet (10 mg total) by mouth once daily. 90 tablet 3    blood sugar diagnostic Strp To check BG 1 times daily, to use with insurance preferred meter 100 strip 3    blood-glucose meter kit To check BG 1 times daily, to use with insurance preferred meter 1 each 0    calcitRIOL (ROCALTROL) 0.25 MCG Cap Take 1 capsule (0.25 mcg total) by mouth once daily.      cholecalciferol, vitamin D3, (VITAMIN D3) 25 mcg (1,000 unit) capsule Take 1 capsule (1,000 Units total) by mouth once daily. 90 capsule 3    clopidogreL (PLAVIX) 75 mg tablet Take 1 tablet (75 mg total) by mouth once daily. 90 tablet 3    clotrimazole-betamethasone 1-0.05% (LOTRISONE) cream Apply topically 2 (two) times daily. 15 g 0    cyproheptadine (PERIACTIN) 4 mg tablet       ELIQUIS 5 mg Tab Take 1 tablet by mouth twice daily 180 tablet 0    ergocalciferol, vitamin D2, (VITAMIN D ORAL) Take 1 mcg by mouth.      heparin sodium,porcine (HEPARIN, PORCINE,) 1,000 unit/mL Soln injection by Intra-Catheter route.      hydrALAZINE  "(APRESOLINE) 25 MG tablet Take 1 tablet (25 mg total) by mouth every 12 (twelve) hours. 180 tablet 3    hydrocortisone (WESTCORT) 0.2 % cream Apply topically 2 (two) times daily. 15 g 0    labetaloL (NORMODYNE) 100 MG tablet Take 1 tablet (100 mg total) by mouth every 12 (twelve) hours. 180 tablet 3    lancets Misc To check BG 1 times daily, to use with insurance preferred meter 100 each 4    levETIRAcetam (KEPPRA) 250 MG Tab Take 1 tablet (250 mg total) by mouth 2 (two) times daily on Tuesday, Thursday, Saturday & Sunday. Take 2 tablets (500mg total) by mouth in the morning and 1 tablet (250mg total) in the evening on dialysis days (Monday, Wednesday, & Friday) 80 tablet 11    pen needle, diabetic 31 gauge x 1/4" Ndle For mealtime insulin 300 each 11    sodium chloride 0.9% SolP 1,000 mL with heparin (porcine) 10,000 unit/mL Soln 90,000 Units by Intra-Catheter route.      atorvastatin (LIPITOR) 80 MG tablet Take 1 tablet (80 mg total) by mouth once daily. 90 tablet 3    ferrous gluconate 324 mg (37.5 mg iron) Tab tablet Take 1 tablet (324 mg total) by mouth 2 (two) times daily with meals. 60 tablet 11    finasteride (PROSCAR) 5 mg tablet Take 1 tablet (5 mg total) by mouth once daily. 30 tablet 11    folic acid (FOLVITE) 1 MG tablet Take 1 tablet (1 mg total) by mouth once daily. 30 tablet 0    RENVELA 800 mg Tab TAKE 1 TABLET BY MOUTH THREE TIMES DAILY WITH MEALS 90 tablet 0    tamsulosin (FLOMAX) 0.4 mg Cap Take 2 capsules (0.8 mg total) by mouth once daily. 180 capsule 0     No current facility-administered medications for this visit.       Review of patient's allergies indicates:   Allergen Reactions    Penicillins Hives       Review of Systems   Constitutional: Negative for chills and fever.   Cardiovascular: Positive for leg swelling. Negative for chest pain and claudication.   Respiratory: Negative for cough and shortness of breath.    Skin: Positive for dry skin and suspicious lesions " (corns/callus).   Musculoskeletal: Positive for muscle weakness.   Gastrointestinal: Negative for nausea and vomiting.   Neurological: Positive for focal weakness (right sided), numbness and sensory change. Negative for paresthesias.   Psychiatric/Behavioral: Negative for altered mental status.           Objective:      Physical Exam  Vitals reviewed.   Constitutional:       Appearance: He is well-developed.   HENT:      Head: Normocephalic.   Cardiovascular:      Pulses:           Dorsalis pedis pulses are 2+ on the right side and 2+ on the left side.        Posterior tibial pulses are 1+ on the right side and 1+ on the left side.      Comments: CRT < 3 sec to tips of toes. 2+ pitting edema noted to b/l LE. No vericosities noted to b/l LEs.     Pulmonary:      Effort: No respiratory distress.   Musculoskeletal:      Comments: Gastrocnemius equinus noted to b/l ankles with decreased DF noted on exam. MMT 5/5 in DF/PF/Inv/Ev resistance with no reproduction of pain in any direction Right, 3/5 left. Passive range of motion of ankle and pedal joints is painless. Adequate pedal joint ROM.     Rigid hammertoe contractures noted to toes 2-4 R-asymptomatic.     Limited hallux MTPJ ROM with plantarflexion contracture of b/l hallux IPJ, rigid R semi-reducible L.    Skin:     General: Skin is warm and dry.      Findings: Lesion present. No ecchymosis or erythema.      Comments: No open lesions, lacerations or wounds noted. Nails are dystrophic, elongated, thickened with yellow/brown discoloration to R 1 and L 1-5. Remaining toenails normotrophic.  Interdigital spaces clean, dry and intact b/l. No erythema noted to b/l foot. Skin texture thin, shiny, atrophic. Pedal hair absent. Toes cool to touch b/l.     Pre ulcerative callus left heel.    Neurological:      Mental Status: He is alert and oriented to person, place, and time.      Sensory: Sensory deficit present.      Comments: Light touch, proprioception, and sharp/dull  sensation are all intact bilaterally. Protective threshold with the Franklin-Wienstein monofilament is intact bilaterally. Vibratory sensation diminished b/l distal foot.      Psychiatric:         Behavior: Behavior normal.         Thought Content: Thought content normal.         Judgment: Judgment normal.       Assessment:       Encounter Diagnoses   Name Primary?    Type II diabetes mellitus with neurological manifestations Yes    Hammer toe, unspecified laterality          Plan:       Miguel was seen today for diabetes mellitus and nail care.    Diagnoses and all orders for this visit:    Type II diabetes mellitus with neurological manifestations  -     DIABETIC SHOES FOR HOME USE    Hammer toe, unspecified laterality  -     DIABETIC SHOES FOR HOME USE      I counseled the patient on his conditions, their implications and medical management.    - Shoe inspection. Diabetic Foot Education. Patient reminded of the importance of good nutrition and blood sugar control to help prevent podiatric complications of diabetes. Patient instructed on proper foot hygeine. We discussed wearing proper shoe gear, daily foot inspections, never walking without protective shoe gear, never putting sharp instruments to feet, routine podiatric nail visits every 2-3 months.      Discussed regular and routine moisturizer to skin of both feet to help improve dry skin. Advised to apply twice daily until resolution of symptoms. Avoid between toes. Recommended Gold Bond Foot cream or Diabetic cream.      - With patient's permission, nails were aggressively reduced and debrided x 10 to their soft tissue attachment mechanically, removing all offending nail and debris. Patient relates relief following the procedure. He will continue to monitor the areas daily, inspect his feet, wear protective shoe gear when ambulatory, moisturizer to maintain skin integrity and follow in this office in approximately 2-3 months, sooner p.r.n.     Rx diabetic  shoes with custom molded inserts to be worn at all times while ambulating. Prescription provided with list of local retailers.       Sabi Douglas DPM

## 2022-06-30 ENCOUNTER — TELEPHONE (OUTPATIENT)
Dept: FAMILY MEDICINE | Facility: CLINIC | Age: 67
End: 2022-06-30
Payer: MEDICARE

## 2022-06-30 NOTE — TELEPHONE ENCOUNTER
----- Message from Raciel Raymond MD sent at 6/30/2022 11:05 AM CDT -----  Regarding: FW: self [phone]  Form filled out yesterday  ----- Message -----  From: Rubi Jennifer  Sent: 6/30/2022   9:32 AM CDT  To: Mandi Schrader Staff  Subject: self [phone]                                .Type: Patient Call Back    Who called: self     What is the request in detail: Requesting current medication chart noted to be sent to [employer] for diabetic shoes    Can the clinic reply by [facility]? Call back     Would the patient rather a call back or a response via [facility]?  Call back     Best call back number:.[phone]

## 2022-07-01 ENCOUNTER — TELEPHONE (OUTPATIENT)
Dept: FAMILY MEDICINE | Facility: CLINIC | Age: 67
End: 2022-07-01
Payer: MEDICARE

## 2022-07-01 NOTE — TELEPHONE ENCOUNTER
----- Message from Muriel Dias sent at 7/1/2022 10:48 AM CDT -----  Who called?:PT      What is the request in detail:PT would like to speak with staff about receiving current paper work so that he can be fitted for shoes. Please advise         Can the clinic reply by [facility]?:NO        Best Call Back Number: [phone]

## 2022-07-01 NOTE — TELEPHONE ENCOUNTER
I just counter signed the paperwork, I believe staff faxed it about 1-2 days ago.  May just need to take time for the DME company to get it

## 2022-07-05 ENCOUNTER — TELEPHONE (OUTPATIENT)
Dept: FAMILY MEDICINE | Facility: CLINIC | Age: 67
End: 2022-07-05
Payer: MEDICARE

## 2022-07-05 NOTE — TELEPHONE ENCOUNTER
----- Message from Beth Juan sent at 7/1/2022  9:44 AM CDT -----  Type: Patient Call Back    Who called:lee ann with [employer] [phone] fax [phone]    What is the request in detail:requesting last doctor's note for mnf. Call lee ann. She received notes from December. call    Can the clinic reply by [facility]?    Would the patient rather a call back or a response via [facility]? call    Best call back number:    Additional Information:

## 2022-07-05 NOTE — TELEPHONE ENCOUNTER
----- Message from Ann Chambers sent at 7/5/2022  8:42 AM CDT -----  Name of Who is Calling: TRACI CHAO [[id number]]           What is the request in detail: Patient is requesting a call back to schedule an appointment. Please assist.           Can the clinic reply by [facility]: No           What Number to Call Back if not in [facility]: [phone]

## 2022-07-16 NOTE — TELEPHONE ENCOUNTER
Patient requesting referral to Physical Med for evaluation for Motor Scooter.   · Coreg dose increased today

## 2022-08-02 ENCOUNTER — TELEPHONE (OUTPATIENT)
Dept: FAMILY MEDICINE | Facility: CLINIC | Age: 67
End: 2022-08-02
Payer: MEDICARE

## 2022-08-02 NOTE — TELEPHONE ENCOUNTER
----- Message from Raciel Raymond MD sent at 8/2/2022 10:54 AM CDT -----  Will send to [facility] for PA. Please let pt know.  If they can't get it authorized, he will need to talk to his nephrologist for an alternative.  ----- Message -----  From: Kavitha Godinez MA  Sent: 8/2/2022   9:04 AM CDT  To: Raciel Raymond MD      ----- Message -----  From: Maddy Juarez MA  Sent: 8/2/2022   8:48 AM CDT  To: [facility] Staff    Type: Patient Call Back    Who called: Mark Roman    What is the request in detail: insurance ill not pay for brand medication .. needs a generic..    Can the clinic reply by [facility]?no     Would the patient rather a call back or a response via [facility]? yes    Best call back number: [phone]

## 2022-08-03 ENCOUNTER — TELEPHONE (OUTPATIENT)
Dept: PHARMACY | Facility: CLINIC | Age: 67
End: 2022-08-03
Payer: MEDICARE

## 2022-08-03 ENCOUNTER — OFFICE VISIT (OUTPATIENT)
Dept: UROLOGY | Facility: CLINIC | Age: 67
End: 2022-08-03
Payer: MEDICARE

## 2022-08-03 VITALS — BODY MASS INDEX: 25.22 KG/M2 | WEIGHT: 165.88 LBS

## 2022-08-03 DIAGNOSIS — R39.89 SUSPECTED UTI: Primary | ICD-10-CM

## 2022-08-03 DIAGNOSIS — R97.20 ELEVATED PSA: ICD-10-CM

## 2022-08-03 LAB
BACTERIA #/AREA URNS HPF: ABNORMAL /HPF
BILIRUB SERPL-MCNC: NORMAL MG/DL
BLOOD URINE, POC: NORMAL
COLOR, POC UA: YELLOW
GLUCOSE UR QL STRIP: NORMAL
KETONES UR QL STRIP: NORMAL
LEUKOCYTE ESTERASE URINE, POC: NORMAL
MICROSCOPIC COMMENT: ABNORMAL
NITRITE, POC UA: NORMAL
PH, POC UA: 7
POC RESIDUAL URINE VOLUME: 59 ML (ref 0–100)
PROTEIN, POC: NORMAL
RBC #/AREA URNS HPF: 78 /HPF (ref 0–4)
SPECIFIC GRAVITY, POC UA: 1010
UROBILINOGEN, POC UA: NORMAL
WBC #/AREA URNS HPF: >100 /HPF (ref 0–5)
WBC CLUMPS URNS QL MICRO: ABNORMAL
YEAST URNS QL MICRO: ABNORMAL

## 2022-08-03 PROCEDURE — 1160F RVW MEDS BY RX/DR IN RCRD: CPT | Mod: CPTII,S$GLB,, | Performed by: STUDENT IN AN ORGANIZED HEALTH CARE EDUCATION/TRAINING PROGRAM

## 2022-08-03 PROCEDURE — 87186 SC STD MICRODIL/AGAR DIL: CPT | Performed by: STUDENT IN AN ORGANIZED HEALTH CARE EDUCATION/TRAINING PROGRAM

## 2022-08-03 PROCEDURE — 87077 CULTURE AEROBIC IDENTIFY: CPT | Performed by: STUDENT IN AN ORGANIZED HEALTH CARE EDUCATION/TRAINING PROGRAM

## 2022-08-03 PROCEDURE — 3008F BODY MASS INDEX DOCD: CPT | Mod: CPTII,S$GLB,, | Performed by: STUDENT IN AN ORGANIZED HEALTH CARE EDUCATION/TRAINING PROGRAM

## 2022-08-03 PROCEDURE — 87086 URINE CULTURE/COLONY COUNT: CPT | Performed by: STUDENT IN AN ORGANIZED HEALTH CARE EDUCATION/TRAINING PROGRAM

## 2022-08-03 PROCEDURE — 1126F AMNT PAIN NOTED NONE PRSNT: CPT | Mod: CPTII,S$GLB,, | Performed by: STUDENT IN AN ORGANIZED HEALTH CARE EDUCATION/TRAINING PROGRAM

## 2022-08-03 PROCEDURE — 81000 URINALYSIS NONAUTO W/SCOPE: CPT | Performed by: STUDENT IN AN ORGANIZED HEALTH CARE EDUCATION/TRAINING PROGRAM

## 2022-08-03 PROCEDURE — 1160F PR REVIEW ALL MEDS BY PRESCRIBER/CLIN PHARMACIST DOCUMENTED: ICD-10-PCS | Mod: CPTII,S$GLB,, | Performed by: STUDENT IN AN ORGANIZED HEALTH CARE EDUCATION/TRAINING PROGRAM

## 2022-08-03 PROCEDURE — 1159F PR MEDICATION LIST DOCUMENTED IN MEDICAL RECORD: ICD-10-PCS | Mod: CPTII,S$GLB,, | Performed by: STUDENT IN AN ORGANIZED HEALTH CARE EDUCATION/TRAINING PROGRAM

## 2022-08-03 PROCEDURE — 99214 PR OFFICE/OUTPT VISIT, EST, LEVL IV, 30-39 MIN: ICD-10-PCS | Mod: S$GLB,,, | Performed by: STUDENT IN AN ORGANIZED HEALTH CARE EDUCATION/TRAINING PROGRAM

## 2022-08-03 PROCEDURE — 81001 URINALYSIS AUTO W/SCOPE: CPT | Mod: S$GLB,,, | Performed by: STUDENT IN AN ORGANIZED HEALTH CARE EDUCATION/TRAINING PROGRAM

## 2022-08-03 PROCEDURE — 51798 US URINE CAPACITY MEASURE: CPT | Mod: S$GLB,,, | Performed by: STUDENT IN AN ORGANIZED HEALTH CARE EDUCATION/TRAINING PROGRAM

## 2022-08-03 PROCEDURE — 1101F PR PT FALLS ASSESS DOC 0-1 FALLS W/OUT INJ PAST YR: ICD-10-PCS | Mod: CPTII,S$GLB,, | Performed by: STUDENT IN AN ORGANIZED HEALTH CARE EDUCATION/TRAINING PROGRAM

## 2022-08-03 PROCEDURE — 3288F PR FALLS RISK ASSESSMENT DOCUMENTED: ICD-10-PCS | Mod: CPTII,S$GLB,, | Performed by: STUDENT IN AN ORGANIZED HEALTH CARE EDUCATION/TRAINING PROGRAM

## 2022-08-03 PROCEDURE — 1101F PT FALLS ASSESS-DOCD LE1/YR: CPT | Mod: CPTII,S$GLB,, | Performed by: STUDENT IN AN ORGANIZED HEALTH CARE EDUCATION/TRAINING PROGRAM

## 2022-08-03 PROCEDURE — 1159F MED LIST DOCD IN RCRD: CPT | Mod: CPTII,S$GLB,, | Performed by: STUDENT IN AN ORGANIZED HEALTH CARE EDUCATION/TRAINING PROGRAM

## 2022-08-03 PROCEDURE — 3288F FALL RISK ASSESSMENT DOCD: CPT | Mod: CPTII,S$GLB,, | Performed by: STUDENT IN AN ORGANIZED HEALTH CARE EDUCATION/TRAINING PROGRAM

## 2022-08-03 PROCEDURE — 3008F PR BODY MASS INDEX (BMI) DOCUMENTED: ICD-10-PCS | Mod: CPTII,S$GLB,, | Performed by: STUDENT IN AN ORGANIZED HEALTH CARE EDUCATION/TRAINING PROGRAM

## 2022-08-03 PROCEDURE — 87088 URINE BACTERIA CULTURE: CPT | Performed by: STUDENT IN AN ORGANIZED HEALTH CARE EDUCATION/TRAINING PROGRAM

## 2022-08-03 PROCEDURE — 51798 POCT BLADDER SCAN: ICD-10-PCS | Mod: S$GLB,,, | Performed by: STUDENT IN AN ORGANIZED HEALTH CARE EDUCATION/TRAINING PROGRAM

## 2022-08-03 PROCEDURE — 1126F PR PAIN SEVERITY QUANTIFIED, NO PAIN PRESENT: ICD-10-PCS | Mod: CPTII,S$GLB,, | Performed by: STUDENT IN AN ORGANIZED HEALTH CARE EDUCATION/TRAINING PROGRAM

## 2022-08-03 PROCEDURE — 81001 POCT URINALYSIS, DIPSTICK OR TABLET REAGENT, AUTOMATED, WITH MICROSCOP: ICD-10-PCS | Mod: S$GLB,,, | Performed by: STUDENT IN AN ORGANIZED HEALTH CARE EDUCATION/TRAINING PROGRAM

## 2022-08-03 PROCEDURE — 99214 OFFICE O/P EST MOD 30 MIN: CPT | Mod: S$GLB,,, | Performed by: STUDENT IN AN ORGANIZED HEALTH CARE EDUCATION/TRAINING PROGRAM

## 2022-08-03 PROCEDURE — 99999 PR PBB SHADOW E&M-EST. PATIENT-LVL IV: ICD-10-PCS | Mod: PBBFAC,,, | Performed by: STUDENT IN AN ORGANIZED HEALTH CARE EDUCATION/TRAINING PROGRAM

## 2022-08-03 PROCEDURE — 99999 PR PBB SHADOW E&M-EST. PATIENT-LVL IV: CPT | Mod: PBBFAC,,, | Performed by: STUDENT IN AN ORGANIZED HEALTH CARE EDUCATION/TRAINING PROGRAM

## 2022-08-03 RX ORDER — CIPROFLOXACIN 500 MG/1
250 TABLET ORAL DAILY
Qty: 3 TABLET | Refills: 0 | Status: SHIPPED | OUTPATIENT
Start: 2022-08-03 | End: 2022-08-08

## 2022-08-03 NOTE — TELEPHONE ENCOUNTER
[facility] unable to get prior auth approved.  Please have pt discuss with his nephrologist about renvela or alternatives.

## 2022-08-03 NOTE — PROGRESS NOTES
Patient ID: Miguel Moore is a 66 y.o. male.    Chief Complaint: Other (Strain to urinate) and Urinary Frequency        HPI  66 y.o. who presents to the Urology clinic for evaluation of concern for UTI. Pt is established with Dr. Styles, previously noted to have elevated PSA.  Notes for the past several days he has noted he has to strain to urinate, he does not make much urine due to dialysis.   Denies flank pain, nausea, vomiting, hematuria, pain with urination.       Medically Necessary ROS documented in HPI    Past Medical History  Active Ambulatory Problems     Diagnosis Date Noted    Benign essential HTN 03/21/2017    Pure hypercholesterolemia 03/21/2017    Controlled type 2 diabetes mellitus with chronic kidney disease on chronic dialysis, with long-term current use of insulin 03/21/2017    BPH (benign prostatic hyperplasia) 2/18/21 prostate bx normal 03/21/2017    Seizure disorder as sequela of cerebrovascular accident 03/21/2017    Hemiplegia and hemiparesis following cerebral infarction affecting right dominant side 03/21/2017    Microcytosis 9/2019 labs c/w AoCD and AoCKD 03/22/2017    ESRD (end stage renal disease) 03/22/2017    Nuclear sclerosis, left 06/09/2017    Cortical cataract of both eyes 06/09/2017    DM type 2 without retinopathy 06/09/2017    Secondary hyperparathyroidism of renal origin 08/03/2017    Vitamin D deficiency 08/03/2017    Right sided weakness 09/11/2017    Senile nuclear sclerosis 10/05/2017    CME (cystoid macular edema), bilateral 11/16/2017    Refractive error 11/16/2017    History of stroke 12/04/2017    Type 2 diabetes mellitus with diabetic neuropathy, with long-term current use of insulin 05/10/2018    Nephrotic syndrome 08/16/2020    Anasarca 08/16/2020    Hypocalcemia 08/16/2020    Candida parapsilosis infection while hospitalized 8/2020 08/29/2020    Acute deep vein thrombosis (DVT) of popliteal vein of right lower extremity on outside RLE US  9/21/2020, unprovoked; eliquis 09/21/2020    Pancytopenia 01/04/2021    Elevated liver enzymes 01/04/2021    Pulmonary nodule 1/2021 4 mm nodule. 01/06/2021    Hematuria 01/10/2021    Elevated PSA 2/18/21 prostate bx normal 02/18/2021    Anemia in chronic kidney disease 08/26/2020    Heel ulceration, right, with fat layer exposed     Pseudophakia 01/06/2022     Resolved Ambulatory Problems     Diagnosis Date Noted    History of stroke with residual effects 01/13/2015    Hemiparesis affecting dominant side as late effect of cerebrovascular accident 05/30/2017    Muscle tightness 06/23/2017    Impaired functional mobility, balance, gait, and endurance 06/23/2017    Post-operative state 10/06/2017    Non-traumatic rhabdomyolysis 08/15/2020    Enlarged prostate 08/16/2020    Anemia 08/16/2020    Hyperkalemia 08/18/2020    Fungemia 08/25/2020    Candidemia 08/27/2020    Fungemia 08/27/2020    ANA (acute kidney injury)     Hyperkalemia 01/04/2021    Encephalopathy, metabolic 01/04/2021    Sepsis 01/04/2021    Hypoglycemia 01/04/2021    Weakness 01/05/2021    History of deep vein thrombosis (DVT) of lower extremity 01/06/2021    FUO (fever of unknown origin) 01/08/2021    Fever and neutropenia      Past Medical History:   Diagnosis Date    Allergy     Clotting disorder     Deep vein thrombosis     Diabetes mellitus, type 2     Hypertension     Nuclear sclerosis of both eyes 6/9/2017    Renal disorder     Seizures     Stroke     Urinary tract infection          Past Surgical History  Past Surgical History:   Procedure Laterality Date    CATARACT EXTRACTION W/  INTRAOCULAR LENS IMPLANT Right 10/05/2017    Dr. Tay    CATARACT EXTRACTION W/  INTRAOCULAR LENS IMPLANT Left 10/19/2017    Dr. Tay    CYSTOSCOPY W/ RETROGRADES Bilateral 2/18/2021    Procedure: CYSTOSCOPY, WITH RETROGRADE PYELOGRAM;  Surgeon: JEMMA Styles MD;  Location: [facility];  Service: Urology;   Laterality: Bilateral;  REQUESTING EARLY AS POSSIBE-LO / 2/8/2021 @ 1:13PM  RN Pre Op 2-11-21, Covid NEGATIVE ON  2-17-21.  C A    ESOPHAGOGASTRODUODENOSCOPY N/A 8/17/2020    Procedure: EGD (ESOPHAGOGASTRODUODENOSCOPY);  Surgeon: Desmond Chapman MD;  Location: [facility];  Service: Endoscopy;  Laterality: N/A;    EYE SURGERY Bilateral     cataract    FEMORAL ARTERY STENT      FRACTURE SURGERY      KNEE SURGERY      bilateral scope       Social History  Social Connections: Not on file       Medications    Current Outpatient Medications:     amLODIPine (NORVASC) 10 MG tablet, Take 1 tablet (10 mg total) by mouth once daily., Disp: 90 tablet, Rfl: 3    blood sugar diagnostic Strp, To check BG 1 times daily, to use with insurance preferred meter, Disp: 100 strip, Rfl: 3    blood-glucose meter kit, To check BG 1 times daily, to use with insurance preferred meter, Disp: 1 each, Rfl: 0    calcitRIOL (ROCALTROL) 0.25 MCG Cap, Take 1 capsule (0.25 mcg total) by mouth once daily., Disp:  , Rfl:     cholecalciferol, vitamin D3, (VITAMIN D3) 25 mcg (1,000 unit) capsule, Take 1 capsule (1,000 Units total) by mouth once daily., Disp: 90 capsule, Rfl: 3    clopidogreL (PLAVIX) 75 mg tablet, Take 1 tablet (75 mg total) by mouth once daily., Disp: 90 tablet, Rfl: 3    clotrimazole-betamethasone 1-0.05% (LOTRISONE) cream, Apply topically 2 (two) times daily., Disp: 15 g, Rfl: 0    cyproheptadine (PERIACTIN) 4 mg tablet, , Disp: , Rfl:     ELIQUIS 5 mg Tab, Take 1 tablet by mouth twice daily, Disp: 180 tablet, Rfl: 0    ergocalciferol, vitamin D2, (VITAMIN D ORAL), Take 1 mcg by mouth., Disp: , Rfl:     heparin sodium,porcine (HEPARIN, PORCINE,) 1,000 unit/mL Soln injection, by Intra-Catheter route., Disp: , Rfl:     hydrALAZINE (APRESOLINE) 25 MG tablet, Take 1 tablet (25 mg total) by mouth every 12 (twelve) hours., Disp: 180 tablet, Rfl: 3    hydrocortisone (WESTCORT) 0.2 % cream, Apply topically 2 (two) times  "daily., Disp: 15 g, Rfl: 0    labetaloL (NORMODYNE) 100 MG tablet, Take 1 tablet (100 mg total) by mouth every 12 (twelve) hours., Disp: 180 tablet, Rfl: 3    lancets Misc, To check BG 1 times daily, to use with insurance preferred meter, Disp: 100 each, Rfl: 4    levETIRAcetam (KEPPRA) 250 MG Tab, Take 1 tablet (250 mg total) by mouth 2 (two) times daily on Tuesday, Thursday, Saturday & Sunday. Take 2 tablets (500mg total) by mouth in the morning and 1 tablet (250mg total) in the evening on dialysis days (Monday, Wednesday, & Friday), Disp: 80 tablet, Rfl: 11    pen needle, diabetic 31 gauge x 1/4" Ndle, For mealtime insulin, Disp: 300 each, Rfl: 11    RENVELA 800 mg Tab, Take 1 tablet (800 mg total) by mouth 3 (three) times daily with meals., Disp: 90 tablet, Rfl: 0    sodium chloride 0.9% SolP 1,000 mL with heparin (porcine) 10,000 unit/mL Soln 90,000 Units, by Intra-Catheter route., Disp: , Rfl:     tamsulosin (FLOMAX) 0.4 mg Cap, Take 2 capsules (0.8 mg total) by mouth once daily., Disp: 180 capsule, Rfl: 0    atorvastatin (LIPITOR) 80 MG tablet, Take 1 tablet (80 mg total) by mouth once daily., Disp: 90 tablet, Rfl: 3    ferrous gluconate 324 mg (37.5 mg iron) Tab tablet, Take 1 tablet (324 mg total) by mouth 2 (two) times daily with meals., Disp: 60 tablet, Rfl: 11    finasteride (PROSCAR) 5 mg tablet, Take 1 tablet (5 mg total) by mouth once daily., Disp: 30 tablet, Rfl: 11    folic acid (FOLVITE) 1 MG tablet, Take 1 tablet (1 mg total) by mouth once daily., Disp: 30 tablet, Rfl: 0    Allergies  Review of patient's allergies indicates:   Allergen Reactions    Ace inhibitors      Hyperkalemia 8/2018  Other reaction(s): Unknown    Penicillins Hives    Tizanidine      Felt hot       Patient's PMH, FH, Social hx, Medications, allergies reviewed and updated as pertinent to today's visit    Objective:      Physical Exam  Constitutional:       General: He is not in acute distress.     Appearance: He " is well-developed. He is not ill-appearing, toxic-appearing or diaphoretic.   HENT:      Head: Normocephalic and atraumatic.      Mouth/Throat:      Mouth: Mucous membranes are moist.   Eyes:      Conjunctiva/sclera: Conjunctivae normal.   Pulmonary:      Effort: Pulmonary effort is normal. No respiratory distress.   Abdominal:      General: There is no distension.      Palpations: Abdomen is soft. There is no mass.      Tenderness: There is no abdominal tenderness. There is no guarding.   Musculoskeletal:         General: No swelling or deformity.      Cervical back: Neck supple.      Comments: wheelchair   Skin:     General: Skin is warm.      Capillary Refill: Capillary refill takes less than 2 seconds.      Findings: No rash.   Neurological:      Mental Status: He is alert and oriented to person, place, and time.   Psychiatric:         Mood and Affect: Mood normal.         Thought Content: Thought content normal.         Judgment: Judgment normal.             Lab Results   Component Value Date    PSADIAG 16.7 (H) 11/02/2021     Assessment:       1. Suspected UTI    2. Elevated PSA        Plan:       PVR 59 cc, no signs of retention  Urine culture sent/ POCT UA    Possible blood, micro sent as well  Patient requesting abx, renal dosed cipro provided  Follow up with Dr. Styles after MRI for elevated PSA history - assisted patient with scheduling

## 2022-08-04 ENCOUNTER — PES CALL (OUTPATIENT)
Dept: ADMINISTRATIVE | Facility: CLINIC | Age: 67
End: 2022-08-04
Payer: MEDICARE

## 2022-08-05 LAB — BACTERIA UR CULT: ABNORMAL

## 2022-08-07 DIAGNOSIS — R39.89 SUSPECTED UTI: ICD-10-CM

## 2022-08-08 ENCOUNTER — TELEPHONE (OUTPATIENT)
Dept: FAMILY MEDICINE | Facility: CLINIC | Age: 67
End: 2022-08-08
Payer: MEDICARE

## 2022-08-08 NOTE — TELEPHONE ENCOUNTER
----- Message from Garry Agudelo sent at 8/8/2022  1:46 PM CDT -----  Type: Patient Call Back    Who called:self    What is the request in detail:Pt would like to know if doctor can prescribe the generic version of RENVELA 800 mg Tab instead.    Can the clinic reply by [facility]?no    Would the patient rather a call back or a response via [facility]? call    Best call back number:[phone] (home)       Additional Information:[facility] [address] [place], [place] [address] - [address] [place] [address]   Phone:  [phone]  Fax:  [phone]

## 2022-08-08 NOTE — TELEPHONE ENCOUNTER
Please tell pt he should get this med from his nephrologist.  There are some generic alternatives but not sure if this is as good as the renvela.  Nephrology generally manages this.

## 2022-08-09 ENCOUNTER — TELEPHONE (OUTPATIENT)
Dept: ADMINISTRATIVE | Facility: CLINIC | Age: 67
End: 2022-08-09
Payer: MEDICARE

## 2022-08-09 NOTE — TELEPHONE ENCOUNTER
Called pt, informed pt I was calling to remind pt of his in office E[facility]V on 8/11/22; clinic location provided to patient; pt confirmed appointment

## 2022-08-18 ENCOUNTER — OFFICE VISIT (OUTPATIENT)
Dept: FAMILY MEDICINE | Facility: CLINIC | Age: 67
End: 2022-08-18
Payer: MEDICARE

## 2022-08-18 ENCOUNTER — OFFICE VISIT (OUTPATIENT)
Dept: PODIATRY | Facility: CLINIC | Age: 67
End: 2022-08-18
Payer: MEDICARE

## 2022-08-18 VITALS
SYSTOLIC BLOOD PRESSURE: 96 MMHG | HEIGHT: 68 IN | WEIGHT: 166 LBS | HEIGHT: 68 IN | OXYGEN SATURATION: 98 % | TEMPERATURE: 98 F | BODY MASS INDEX: 25.16 KG/M2 | BODY MASS INDEX: 25.22 KG/M2 | HEART RATE: 73 BPM | DIASTOLIC BLOOD PRESSURE: 60 MMHG

## 2022-08-18 DIAGNOSIS — R22.41 MASS OF FOOT, RIGHT: ICD-10-CM

## 2022-08-18 DIAGNOSIS — I10 BENIGN ESSENTIAL HTN: Chronic | ICD-10-CM

## 2022-08-18 DIAGNOSIS — Z79.4 TYPE 2 DIABETES MELLITUS WITH DIABETIC NEUROPATHY, WITH LONG-TERM CURRENT USE OF INSULIN: Primary | ICD-10-CM

## 2022-08-18 DIAGNOSIS — Z99.2 CONTROLLED TYPE 2 DIABETES MELLITUS WITH CHRONIC KIDNEY DISEASE ON CHRONIC DIALYSIS, WITH LONG-TERM CURRENT USE OF INSULIN: ICD-10-CM

## 2022-08-18 DIAGNOSIS — E11.40 TYPE 2 DIABETES MELLITUS WITH DIABETIC NEUROPATHY, WITH LONG-TERM CURRENT USE OF INSULIN: Primary | ICD-10-CM

## 2022-08-18 DIAGNOSIS — I69.398 SEIZURE DISORDER AS SEQUELA OF CEREBROVASCULAR ACCIDENT: Chronic | ICD-10-CM

## 2022-08-18 DIAGNOSIS — E78.00 PURE HYPERCHOLESTEROLEMIA: Chronic | ICD-10-CM

## 2022-08-18 DIAGNOSIS — N40.0 BENIGN NON-NODULAR PROSTATIC HYPERPLASIA WITHOUT LOWER URINARY TRACT SYMPTOMS: Chronic | ICD-10-CM

## 2022-08-18 DIAGNOSIS — Z23 NEED FOR VACCINATION FOR STREP PNEUMONIAE: ICD-10-CM

## 2022-08-18 DIAGNOSIS — Z12.11 SCREENING FOR COLON CANCER: ICD-10-CM

## 2022-08-18 DIAGNOSIS — G40.909 SEIZURE DISORDER AS SEQUELA OF CEREBROVASCULAR ACCIDENT: Chronic | ICD-10-CM

## 2022-08-18 DIAGNOSIS — N18.6 CONTROLLED TYPE 2 DIABETES MELLITUS WITH CHRONIC KIDNEY DISEASE ON CHRONIC DIALYSIS, WITH LONG-TERM CURRENT USE OF INSULIN: ICD-10-CM

## 2022-08-18 DIAGNOSIS — E11.9 DM TYPE 2 WITHOUT RETINOPATHY: ICD-10-CM

## 2022-08-18 DIAGNOSIS — E11.22 CONTROLLED TYPE 2 DIABETES MELLITUS WITH CHRONIC KIDNEY DISEASE ON CHRONIC DIALYSIS, WITH LONG-TERM CURRENT USE OF INSULIN: ICD-10-CM

## 2022-08-18 DIAGNOSIS — D61.818 PANCYTOPENIA: ICD-10-CM

## 2022-08-18 DIAGNOSIS — I69.351 HEMIPLEGIA AND HEMIPARESIS FOLLOWING CEREBRAL INFARCTION AFFECTING RIGHT DOMINANT SIDE: Chronic | ICD-10-CM

## 2022-08-18 DIAGNOSIS — N25.81 SECONDARY HYPERPARATHYROIDISM OF RENAL ORIGIN: ICD-10-CM

## 2022-08-18 DIAGNOSIS — I82.431 ACUTE DEEP VEIN THROMBOSIS (DVT) OF POPLITEAL VEIN OF RIGHT LOWER EXTREMITY: ICD-10-CM

## 2022-08-18 DIAGNOSIS — N18.6 ESRD (END STAGE RENAL DISEASE): ICD-10-CM

## 2022-08-18 DIAGNOSIS — Z79.4 CONTROLLED TYPE 2 DIABETES MELLITUS WITH CHRONIC KIDNEY DISEASE ON CHRONIC DIALYSIS, WITH LONG-TERM CURRENT USE OF INSULIN: ICD-10-CM

## 2022-08-18 DIAGNOSIS — E11.49 TYPE II DIABETES MELLITUS WITH NEUROLOGICAL MANIFESTATIONS: Primary | ICD-10-CM

## 2022-08-18 PROCEDURE — 99499 RISK ADDL DX/OHS AUDIT: ICD-10-PCS | Mod: HCNC,S$GLB,, | Performed by: INTERNAL MEDICINE

## 2022-08-18 PROCEDURE — 99499 UNLISTED E&M SERVICE: CPT | Mod: HCNC,S$GLB,, | Performed by: INTERNAL MEDICINE

## 2022-08-18 PROCEDURE — 1101F PR PT FALLS ASSESS DOC 0-1 FALLS W/OUT INJ PAST YR: ICD-10-PCS | Mod: CPTII,S$GLB,, | Performed by: PODIATRIST

## 2022-08-18 PROCEDURE — 1160F PR REVIEW ALL MEDS BY PRESCRIBER/CLIN PHARMACIST DOCUMENTED: ICD-10-PCS | Mod: CPTII,S$GLB,, | Performed by: INTERNAL MEDICINE

## 2022-08-18 PROCEDURE — 3074F SYST BP LT 130 MM HG: CPT | Mod: CPTII,S$GLB,, | Performed by: INTERNAL MEDICINE

## 2022-08-18 PROCEDURE — 1101F PT FALLS ASSESS-DOCD LE1/YR: CPT | Mod: CPTII,S$GLB,, | Performed by: PODIATRIST

## 2022-08-18 PROCEDURE — 1159F PR MEDICATION LIST DOCUMENTED IN MEDICAL RECORD: ICD-10-PCS | Mod: CPTII,S$GLB,, | Performed by: INTERNAL MEDICINE

## 2022-08-18 PROCEDURE — 3008F PR BODY MASS INDEX (BMI) DOCUMENTED: ICD-10-PCS | Mod: CPTII,S$GLB,, | Performed by: INTERNAL MEDICINE

## 2022-08-18 PROCEDURE — 3008F BODY MASS INDEX DOCD: CPT | Mod: CPTII,S$GLB,, | Performed by: INTERNAL MEDICINE

## 2022-08-18 PROCEDURE — 99214 OFFICE O/P EST MOD 30 MIN: CPT | Mod: S$GLB,,, | Performed by: INTERNAL MEDICINE

## 2022-08-18 PROCEDURE — G0009 PNEUMOCOCCAL CONJUGATE VACCINE 20-VALENT: ICD-10-PCS | Mod: S$GLB,,, | Performed by: INTERNAL MEDICINE

## 2022-08-18 PROCEDURE — 3008F PR BODY MASS INDEX (BMI) DOCUMENTED: ICD-10-PCS | Mod: CPTII,S$GLB,, | Performed by: PODIATRIST

## 2022-08-18 PROCEDURE — 3288F PR FALLS RISK ASSESSMENT DOCUMENTED: ICD-10-PCS | Mod: CPTII,S$GLB,, | Performed by: PODIATRIST

## 2022-08-18 PROCEDURE — 99499 RISK ADDL DX/OHS AUDIT: ICD-10-PCS | Mod: HCNC,S$GLB,, | Performed by: PODIATRIST

## 2022-08-18 PROCEDURE — 99999 PR PBB SHADOW E&M-EST. PATIENT-LVL V: ICD-10-PCS | Mod: PBBFAC,,, | Performed by: INTERNAL MEDICINE

## 2022-08-18 PROCEDURE — 1101F PR PT FALLS ASSESS DOC 0-1 FALLS W/OUT INJ PAST YR: ICD-10-PCS | Mod: CPTII,S$GLB,, | Performed by: INTERNAL MEDICINE

## 2022-08-18 PROCEDURE — G0009 ADMIN PNEUMOCOCCAL VACCINE: HCPCS | Mod: S$GLB,,, | Performed by: INTERNAL MEDICINE

## 2022-08-18 PROCEDURE — 3288F FALL RISK ASSESSMENT DOCD: CPT | Mod: CPTII,S$GLB,, | Performed by: INTERNAL MEDICINE

## 2022-08-18 PROCEDURE — 99999 PR PBB SHADOW E&M-EST. PATIENT-LVL V: CPT | Mod: PBBFAC,,, | Performed by: INTERNAL MEDICINE

## 2022-08-18 PROCEDURE — 90677 PNEUMOCOCCAL CONJUGATE VACCINE 20-VALENT: ICD-10-PCS | Mod: S$GLB,,, | Performed by: INTERNAL MEDICINE

## 2022-08-18 PROCEDURE — 3074F PR MOST RECENT SYSTOLIC BLOOD PRESSURE < 130 MM HG: ICD-10-PCS | Mod: CPTII,S$GLB,, | Performed by: INTERNAL MEDICINE

## 2022-08-18 PROCEDURE — 3288F FALL RISK ASSESSMENT DOCD: CPT | Mod: CPTII,S$GLB,, | Performed by: PODIATRIST

## 2022-08-18 PROCEDURE — 90677 PCV20 VACCINE IM: CPT | Mod: S$GLB,,, | Performed by: INTERNAL MEDICINE

## 2022-08-18 PROCEDURE — 1126F PR PAIN SEVERITY QUANTIFIED, NO PAIN PRESENT: ICD-10-PCS | Mod: CPTII,S$GLB,, | Performed by: PODIATRIST

## 2022-08-18 PROCEDURE — 3078F DIAST BP <80 MM HG: CPT | Mod: CPTII,S$GLB,, | Performed by: INTERNAL MEDICINE

## 2022-08-18 PROCEDURE — 99499 UNLISTED E&M SERVICE: CPT | Mod: HCNC,S$GLB,, | Performed by: PODIATRIST

## 2022-08-18 PROCEDURE — 1126F AMNT PAIN NOTED NONE PRSNT: CPT | Mod: CPTII,S$GLB,, | Performed by: PODIATRIST

## 2022-08-18 PROCEDURE — 99214 PR OFFICE/OUTPT VISIT, EST, LEVL IV, 30-39 MIN: ICD-10-PCS | Mod: S$GLB,,, | Performed by: PODIATRIST

## 2022-08-18 PROCEDURE — 99999 PR PBB SHADOW E&M-EST. PATIENT-LVL IV: CPT | Mod: PBBFAC,,, | Performed by: PODIATRIST

## 2022-08-18 PROCEDURE — 1101F PT FALLS ASSESS-DOCD LE1/YR: CPT | Mod: CPTII,S$GLB,, | Performed by: INTERNAL MEDICINE

## 2022-08-18 PROCEDURE — 99214 OFFICE O/P EST MOD 30 MIN: CPT | Mod: S$GLB,,, | Performed by: PODIATRIST

## 2022-08-18 PROCEDURE — 3288F PR FALLS RISK ASSESSMENT DOCUMENTED: ICD-10-PCS | Mod: CPTII,S$GLB,, | Performed by: INTERNAL MEDICINE

## 2022-08-18 PROCEDURE — 3008F BODY MASS INDEX DOCD: CPT | Mod: CPTII,S$GLB,, | Performed by: PODIATRIST

## 2022-08-18 PROCEDURE — 1126F PR PAIN SEVERITY QUANTIFIED, NO PAIN PRESENT: ICD-10-PCS | Mod: CPTII,S$GLB,, | Performed by: INTERNAL MEDICINE

## 2022-08-18 PROCEDURE — 99214 PR OFFICE/OUTPT VISIT, EST, LEVL IV, 30-39 MIN: ICD-10-PCS | Mod: S$GLB,,, | Performed by: INTERNAL MEDICINE

## 2022-08-18 PROCEDURE — 1159F MED LIST DOCD IN RCRD: CPT | Mod: CPTII,S$GLB,, | Performed by: INTERNAL MEDICINE

## 2022-08-18 PROCEDURE — 1126F AMNT PAIN NOTED NONE PRSNT: CPT | Mod: CPTII,S$GLB,, | Performed by: INTERNAL MEDICINE

## 2022-08-18 PROCEDURE — 1160F RVW MEDS BY RX/DR IN RCRD: CPT | Mod: CPTII,S$GLB,, | Performed by: INTERNAL MEDICINE

## 2022-08-18 PROCEDURE — 3078F PR MOST RECENT DIASTOLIC BLOOD PRESSURE < 80 MM HG: ICD-10-PCS | Mod: CPTII,S$GLB,, | Performed by: INTERNAL MEDICINE

## 2022-08-18 PROCEDURE — 99999 PR PBB SHADOW E&M-EST. PATIENT-LVL IV: ICD-10-PCS | Mod: PBBFAC,,, | Performed by: PODIATRIST

## 2022-08-18 RX ORDER — LABETALOL 100 MG/1
100 TABLET, FILM COATED ORAL DAILY
Qty: 90 TABLET | Refills: 3 | Status: SHIPPED | OUTPATIENT
Start: 2022-08-18 | End: 2023-10-05

## 2022-08-18 RX ORDER — ATORVASTATIN CALCIUM 80 MG/1
80 TABLET, FILM COATED ORAL DAILY
Qty: 90 TABLET | Refills: 3 | Status: SHIPPED | OUTPATIENT
Start: 2022-08-18 | End: 2023-10-26 | Stop reason: SDUPTHER

## 2022-08-18 RX ORDER — FINASTERIDE 5 MG/1
5 TABLET, FILM COATED ORAL DAILY
Qty: 90 TABLET | Refills: 3 | Status: SHIPPED | OUTPATIENT
Start: 2022-08-18 | End: 2023-08-01 | Stop reason: SDUPTHER

## 2022-08-18 RX ORDER — LEVETIRACETAM 250 MG/1
250 TABLET ORAL 2 TIMES DAILY
Qty: 80 TABLET | Refills: 11 | Status: SHIPPED | OUTPATIENT
Start: 2022-08-18 | End: 2024-03-20 | Stop reason: CLARIF

## 2022-08-18 RX ORDER — HYDRALAZINE HYDROCHLORIDE 25 MG/1
25 TABLET, FILM COATED ORAL EVERY 12 HOURS
Qty: 180 TABLET | Refills: 3 | Status: CANCELLED | OUTPATIENT
Start: 2022-08-18 | End: 2023-08-18

## 2022-08-18 RX ORDER — AMLODIPINE BESYLATE 10 MG/1
10 TABLET ORAL DAILY
Qty: 90 TABLET | Refills: 3 | Status: SHIPPED | OUTPATIENT
Start: 2022-08-18 | End: 2023-08-22

## 2022-08-18 RX ORDER — TAMSULOSIN HYDROCHLORIDE 0.4 MG/1
2 CAPSULE ORAL DAILY
Qty: 180 CAPSULE | Refills: 3 | Status: SHIPPED | OUTPATIENT
Start: 2022-08-18 | End: 2023-07-17

## 2022-08-18 NOTE — PROGRESS NOTES
This note was created by combination of typed  and M-Modal dictation.  Transcription errors may be present.  If there are any questions, please contact me.    Assessment and Plan:   Type 2 diabetes mellitus with diabetic neuropathy, with long-term current use of insulin  Controlled type 2 diabetes mellitus with chronic kidney disease on chronic dialysis, with long-term current use of insulin  DM type 2 without retinopathy  -currently diet controlled.  Checks his blood sugars in the mornings and typically below 120 range.  A1c I do not think is very reliable with him given his dialysis status.    Tests blood sugars on himself daily.  Refilled test strips.  -     blood sugar diagnostic Strp; To check BG 1 times daily, to use with insurance preferred meter  Dispense: 100 strip; Refill: 3    Seizure disorder as sequela of cerebrovascular accident  Hemiplegia and hemiparesis following cerebral infarction affecting right dominant side  -has not taken Keppra in a while.  Since at least last year he thinks.  Recalls the 1 seizure which I think was in the setting of post CVA.  Not sure if he needs continued Keppra.  I have refilled for him but I will also have him follow-up with Neurology to see if he needs to continue to take medication.  Requesting handicap placard, dense hemiplegia after CVA, WC bound, placard given. See scanned media  -     levETIRAcetam (KEPPRA) 250 MG Tab; Take 1 tablet (250 mg total) by mouth 2 (two) times daily on Tuesday, Thursday, Saturday & Sunday. Take 2 tablets (500mg total) by mouth in the morning and 1 tablet (250mg total) in the evening on dialysis days (Monday, Wednesday, & Friday)  Dispense: 80 tablet; Refill: 11  -     Ambulatory referral/consult to Neurology; Future; Expected date: 08/25/2022    Benign essential HTN; ACEI hyperK  -has not been taking his hydralazine.  Taking his labetalol once a day.  Taking his amlodipine.  Blood pressure is difficult to auscultate but seems to  "be in range.  No change in regimen.  -     amLODIPine (NORVASC) 10 MG tablet; Take 1 tablet (10 mg total) by mouth once daily.  Dispense: 90 tablet; Refill: 3  -     labetaloL (NORMODYNE) 100 MG tablet; Take 1 tablet (100 mg total) by mouth once daily at 6am.  Dispense: 90 tablet; Refill: 3    Pure hypercholesterolemia  -refilled statin.  No recent refills.  Restart.  -     atorvastatin (LIPITOR) 80 MG tablet; Take 1 tablet (80 mg total) by mouth once daily.  Dispense: 90 tablet; Refill: 3    Acute deep vein thrombosis (DVT) of popliteal vein of right lower extremity on outside RLE  9/21/2020, unprovoked; eliquis  -on DOAC.  Needs colonoscopy.    ESRD (end stage renal disease)  Secondary hyperparathyroidism of renal origin  Pancytopenia  -managed by nephrology.    Benign non-nodular prostatic hyperplasia without lower urinary tract symptoms  -followed by urology.  On flomax. Not taking proscar. Was rx'd by urology.  He would be agreeable to start the finasteride.  Hx of prostate bx negative.  Has upcoming MRI prostate.  -     tamsulosin (FLOMAX) 0.4 mg Cap; Take 2 capsules (0.8 mg total) by mouth once daily.  Dispense: 180 capsule; Refill: 3  -     finasteride (PROSCAR) 5 mg tablet; Take 1 tablet (5 mg total) by mouth once daily.  Dispense: 90 tablet; Refill: 3    Screening for colon cancer  -needs colonoscopy.  Unprovoked DVT.  Rationale discussed.  He recalls having had colonoscopy before but I don't see anything in the system.  He would be agreeable to proceed. Ordered.  -     Ambulatory referral/consult to Endo Procedure ; Future; Expected date: 08/19/2022    Need for vaccination for Strep pneumoniae  -     (In Office Administered) Pneumococcal Conjugate Vaccine (20 Valent) (IM)          Medications Discontinued During This Encounter   Medication Reason    pen needle, diabetic 31 gauge x 1/4" Ndle     cyproheptadine (PERIACTIN) 4 mg tablet Patient no longer taking    hydrALAZINE (APRESOLINE) 25 MG " tablet Therapy completed    finasteride (PROSCAR) 5 mg tablet Reorder    blood sugar diagnostic Strp Reorder    atorvastatin (LIPITOR) 80 MG tablet Reorder    labetaloL (NORMODYNE) 100 MG tablet Reorder    amLODIPine (NORVASC) 10 MG tablet Reorder    levETIRAcetam (KEPPRA) 250 MG Tab Reorder    tamsulosin (FLOMAX) 0.4 mg Cap Reorder       meds sent this encounter:  Medications Ordered This Encounter   Medications    amLODIPine (NORVASC) 10 MG tablet     Sig: Take 1 tablet (10 mg total) by mouth once daily.     Dispense:  90 tablet     Refill:  3     Increased dose    atorvastatin (LIPITOR) 80 MG tablet     Sig: Take 1 tablet (80 mg total) by mouth once daily.     Dispense:  90 tablet     Refill:  3          blood sugar diagnostic Strp     Sig: To check BG 1 times daily, to use with insurance preferred meter     Dispense:  100 strip     Refill:  3    finasteride (PROSCAR) 5 mg tablet     Sig: Take 1 tablet (5 mg total) by mouth once daily.     Dispense:  90 tablet     Refill:  3    labetaloL (NORMODYNE) 100 MG tablet     Sig: Take 1 tablet (100 mg total) by mouth once daily at 6am.     Dispense:  90 tablet     Refill:  3     .    levETIRAcetam (KEPPRA) 250 MG Tab     Sig: Take 1 tablet (250 mg total) by mouth 2 (two) times daily on Tuesday, Thursday, Saturday & Sunday. Take 2 tablets (500mg total) by mouth in the morning and 1 tablet (250mg total) in the evening on dialysis days (Monday, Wednesday, & Friday)     Dispense:  80 tablet     Refill:  11     verified sig per MD 1/10/21    tamsulosin (FLOMAX) 0.4 mg Cap     Sig: Take 2 capsules (0.8 mg total) by mouth once daily.     Dispense:  180 capsule     Refill:  3       Follow Up: No follow-ups on file. OV 6 months.  Hopefully to have had colonoscopy in the interim.    Subjective:     Chief Complaint   Patient presents with    Diabetes    Hypertension       HPI  Miguel is a 67 y.o. male, last appointment with this clinic was Visit date not  found.  Social History     Tobacco Use    Smoking status: Never Smoker    Smokeless tobacco: Never Used   Substance Use Topics    Alcohol use: No      Social History     Occupational History    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, etc      Social History     Social History Narrative    Lives with daughter       Last visit with me in December   Rash unclear etiology.  Did not look like drug rash.  Finger nail avulsion.  Subsequently saw my partners for abnormal regrowth which he found uncomfortable and was referred to dermatology.  Diabetes at that time controlled not on medication   Hypertension  Hyperlipidemia unclear if he was taking statin  ESRD/HD with secondary hyper PTH.  Followed by Nephrology.    Unprovoked DVT right leg 09/2020.  On DOAC.  Needs colon cancer screening.  Has been very difficult to proceed due to dexterity issues.  History of seizure disorder should be on Keppra.  Saw Urology for dysuria.  Elevated PSA.  Plan is MRI which is scheduled for September.    Outside labs 8/8 CBC 3.4/11.1/34.6/152     Vitamin-D 14.1    Recent fills on amlodipine 10   Eliquis  Plavix  Labetalol   Renvela   Tamsulosin    Presents today unaccompanied.    Needs refill on this test stress.  Not taking anything for his diabetes.  Checks his blood sugars every other day.  He uses his good hand to check his hemiplegic hand every day.  By recall blood sugars typically 120 or less in the mornings.    Blood pressure difficult to auscultate today.  Have to use his right arm which is contractured after his stroke.  His left arm cannot use because that is the arm with the fistula.  Blood pressure today is good.  Reports he is taking amlodipine.  And takes labetalol but once a day.  Does not take his hydralazine.    Got generic for renvela.    Still makes urine  Takes the tamsulosin.    Finasteride is listed but he does not recall taking this medication.  He would be agreeable to start.    Has not  taken keppra x last year.  No seizure.  Recalls having had 1 seizure it sounds like he was in physical therapy at the time and became unresponsive and had a seizure than.  I think this was in the setting of post CVA.  Has not had any seizures since.  I will have him see Neurology.  He saw Neurology years ago.  See if he needs to continue to take Keppra.    We talked again about his unprovoked DVT and need to rule out secondary causes such as neoplasm.  He has been evaluated for his prostate.  Needs colon cancer screening.  Described the colonoscopy procedure and he feels like this is familiar like he had had it done before.  I do not see any records of such.  He has had an EGD done before but no colonoscopy in our records.  He would be agreeable.    Patient Care Team:  Raciel Raymond MD as PCP - General (Internal Medicine)  Gabriella Manjarrez DPM as Consulting Physician (Podiatry)  Mac Chambers Jr., MD as Consulting Physician (Urology)  Geovany Rucker MD as Consulting Physician (Neurology)  Bob Tay MD as Consulting Physician (Ophthalmology)  Tres Reyna MA as Care Coordinator  Adrian Millard MD as Consulting Physician (Nephrology)    Patient Active Problem List    Diagnosis Date Noted    Pseudophakia 01/06/2022    Heel ulceration, right, with fat layer exposed     Elevated PSA 2/18/21 prostate bx normal 02/18/2021 2/18/21 prostate bx normal      Hematuria 01/10/2021    Pulmonary nodule 1/2021 4 mm nodule. 01/06/2021 1/6/2021 CT abd/pelvis: 4 mm nodule in the right middle lobe      Pancytopenia 01/04/2021    Elevated liver enzymes 01/04/2021    Acute deep vein thrombosis (DVT) of popliteal vein of right lower extremity on outside RLE US 9/21/2020, unprovoked; eliquis 09/21/2020    Candida parapsilosis infection while hospitalized 8/2020 08/29/2020     -Found to have candidemia - source unclear  -infectious disease consulted, Started on micafungin and now converted  to fluconazole  -Repeat cultures negative so far  -Dialysis line removed 8/28.   line replaced on 08/31 after line holiday.  -end date of fluconazole will be 09/11/2020.  Patient has ophthalmology follow-up scheduled on 09/08/2020. Tentative end date 9/11/2020 If no endophthalmitis. If noted to have endophthalmitis during optho visit, would need at least 4-6 weeks of treatment      Anemia in chronic kidney disease 08/26/2020    Nephrotic syndrome 08/16/2020    Anasarca 08/16/2020    Hypocalcemia 08/16/2020    Type 2 diabetes mellitus with diabetic neuropathy, with long-term current use of insulin 05/10/2018    History of stroke 12/04/2017    CME (cystoid macular edema), bilateral 11/16/2017    Refractive error 11/16/2017    Senile nuclear sclerosis 10/05/2017    Right sided weakness 09/11/2017    Secondary hyperparathyroidism of renal origin 08/03/2017    Vitamin D deficiency 08/03/2017    Nuclear sclerosis, left 06/09/2017    Cortical cataract of both eyes 06/09/2017    DM type 2 without retinopathy 06/09/2017    Microcytosis 9/2019 labs c/w AoCD and AoCKD 03/22/2017     Seen on admission as well 2015; normal Hb, low MCV.  Normal RDW  08/17/2020 EGD normal esophagus.  Discolored nodular and texture change mucosa in the antrum.  Normal duodenum.  Path with foveolar hyperplasia and early xanthoma formation.  H pylori negative.  Mild chronic inflammation without acute activity      ESRD (end stage renal disease) 03/22/2017 2/27/20 renal US with dopplers no ALF.  Medical renal disease  8/2020 renal US: 1. Symmetric diffusely increased cortical echogenicity and elevated resistive indices, nonspecific indicators of chronic medical renal disease.  2. Prostatomegaly.  Some of the provided images are suggestive of a distinct hyperechoic component of the prostate gland, of uncertain etiology or significance, and potentially artifactual.  Dedicated prostate ultrasound or MRI may be of benefit for further  characterization.    Started on HD while hospitalized for progression to ESRD 8/2020  -Continue dialysis per nephrology  -Outpatient HD has been arranged  -Had planned discharge 8/27 if remained afebrile and cultures negative.  Unfortunately his blood culture grew Candida parapsilosis  -Dr. Gomez with ID consulted and case discussed with him.  Started micafungin 8/27 and now on fluconazole.  -Dialysis line removed after HD 8/28 and plan line holiday over weekend.  -new line by IR on 8/31, tolerated dialysis fluid.  -continue outpatient dialysis.      Benign essential HTN 03/21/2017 01/06/2021 TTE mild left atrial enlargement.  LV normal size and systolic function LVEF 55%.  Indeterminate diastolic function.  Mild right atrial enlargement.  Normal RV systolic function.  Normal RV size.  PA pressure 20.  Normal CVP.  Three.      Pure hypercholesterolemia 03/21/2017    Controlled type 2 diabetes mellitus with chronic kidney disease on chronic dialysis, with long-term current use of insulin 03/21/2017    BPH (benign prostatic hyperplasia) 2/18/21 prostate bx normal 03/21/2017 6/26/2017 renal US mod prostatomegaly.      Seizure disorder as sequela of cerebrovascular accident 03/21/2017    Hemiplegia and hemiparesis following cerebral infarction affecting right dominant side 03/21/2017       PAST MEDICAL PROBLEMS, PAST SURGICAL HISTORY: please see relevant portions of the electronic medical record    ALLERGIES AND MEDICATIONS: updated and reviewed.  Review of patient's allergies indicates:   Allergen Reactions    Ace inhibitors      Hyperkalemia 8/2018  Other reaction(s): Unknown    Penicillins Hives    Tizanidine      Felt hot       Medication List with Changes/Refills   Current Medications    AMLODIPINE (NORVASC) 10 MG TABLET    Take 1 tablet (10 mg total) by mouth once daily.    ATORVASTATIN (LIPITOR) 80 MG TABLET    Take 1 tablet (80 mg total) by mouth once daily.    BLOOD SUGAR DIAGNOSTIC STRP    To  check BG 1 times daily, to use with insurance preferred meter    BLOOD-GLUCOSE METER KIT    To check BG 1 times daily, to use with insurance preferred meter    CALCITRIOL (ROCALTROL) 0.25 MCG CAP    Take 1 capsule (0.25 mcg total) by mouth once daily.    CHOLECALCIFEROL, VITAMIN D3, (VITAMIN D3) 25 MCG (1,000 UNIT) CAPSULE    Take 1 capsule (1,000 Units total) by mouth once daily.    CLOPIDOGREL (PLAVIX) 75 MG TABLET    Take 1 tablet (75 mg total) by mouth once daily.    CLOTRIMAZOLE-BETAMETHASONE 1-0.05% (LOTRISONE) CREAM    Apply topically 2 (two) times daily.    CYPROHEPTADINE (PERIACTIN) 4 MG TABLET        ELIQUIS 5 MG TAB    Take 1 tablet by mouth twice daily    ERGOCALCIFEROL, VITAMIN D2, (VITAMIN D ORAL)    Take 1 mcg by mouth.    FERROUS GLUCONATE 324 MG (37.5 MG IRON) TAB TABLET    Take 1 tablet (324 mg total) by mouth 2 (two) times daily with meals.    FINASTERIDE (PROSCAR) 5 MG TABLET    Take 1 tablet (5 mg total) by mouth once daily.    FOLIC ACID (FOLVITE) 1 MG TABLET    Take 1 tablet (1 mg total) by mouth once daily.    HEPARIN SODIUM,PORCINE (HEPARIN, PORCINE,) 1,000 UNIT/ML SOLN INJECTION    by Intra-Catheter route.    HYDRALAZINE (APRESOLINE) 25 MG TABLET    Take 1 tablet (25 mg total) by mouth every 12 (twelve) hours.    HYDROCORTISONE (WESTCORT) 0.2 % CREAM    Apply topically 2 (two) times daily.    LABETALOL (NORMODYNE) 100 MG TABLET    Take 1 tablet (100 mg total) by mouth every 12 (twelve) hours.    LANCETS MISC    To check BG 1 times daily, to use with insurance preferred meter    LEVETIRACETAM (KEPPRA) 250 MG TAB    Take 1 tablet (250 mg total) by mouth 2 (two) times daily on Tuesday, Thursday, Saturday & Sunday. Take 2 tablets (500mg total) by mouth in the morning and 1 tablet (250mg total) in the evening on dialysis days (Monday, Wednesday, & Friday)    LOPERAMIDE (IMODIUM) 1 MG/5 ML SOLUTION    Take 4 mg by mouth.    METHOXY PEG-EPOETIN BETA (MIRCERA INJ)    30 mcg.    PEN NEEDLE, DIABETIC  "31 GAUGE X 1/4" NDLE    For mealtime insulin    RENVELA 800 MG TAB    Take 1 tablet (800 mg total) by mouth 3 (three) times daily with meals.    SODIUM CHLORIDE 0.9% SOLP 1,000 ML WITH HEPARIN (PORCINE) 10,000 UNIT/ML SOLN 90,000 UNITS    by Intra-Catheter route.    SODIUM CHLORIDE 0.9% SOLP 100 ML WITH IRON SUCROSE 100 MG IRON/5 ML SOLN 100 MG    50 mg.    TAMSULOSIN (FLOMAX) 0.4 MG CAP    Take 2 capsules (0.8 mg total) by mouth once daily.        Objective:   Physical Exam   Vitals:    08/18/22 1033 08/18/22 1154   BP:  96/60   BP Location:  Right arm   Patient Position:  Sitting   BP Method:  Medium (Manual)   Pulse: 73    Temp: 98.3 °F (36.8 °C)    TempSrc: Oral    SpO2: 98%    Height: 5' 8" (1.727 m)     Body mass index is 25.22 kg/m².      Height: 5' 8" (172.7 cm)     Physical Exam  Constitutional:       General: He is not in acute distress.     Appearance: He is well-developed.   Cardiovascular:      Rate and Rhythm: Normal rate and regular rhythm.      Heart sounds: Normal heart sounds. No murmur heard.  Pulmonary:      Effort: Pulmonary effort is normal.      Breath sounds: Normal breath sounds.   Musculoskeletal:         General: Normal range of motion.   Skin:     General: Skin is warm and dry.   Neurological:      Mental Status: He is alert and oriented to person, place, and time. Mental status is at baseline.      Coordination: Coordination abnormal.      Comments: Dense right-sided hemiplegia, baseline   Psychiatric:         Behavior: Behavior normal.         Thought Content: Thought content normal.         "

## 2022-08-21 NOTE — PROGRESS NOTES
"    Subjective:      Patient ID: Miguel Moore is a 67 y.o. male.    Chief Complaint: Diabetes Mellitus (8/18/22 Dr Raymond PCP) and Nail Care    Miguel is a 67 y.o. male who presents to the clinic for evaluation and treatment of high risk feet. Miguel has a past medical history of Allergy, Clotting disorder, Deep vein thrombosis, Diabetes mellitus, type 2, Hypertension, Nuclear sclerosis of both eyes (6/9/2017), Renal disorder, Seizures, Stroke, and Urinary tract infection. The patient's chief complaint is new onset "lump" right dorsal foot denies trauma to area.  This patient has documented high risk feet requiring routine maintenance secondary to diabetes mellitis and those secondary complications of diabetes, as mentioned.         PCP: Raciel Raymond MD    Date Last Seen by PCP:   Chief Complaint   Patient presents with    Diabetes Mellitus     8/18/22 Dr Raymond PCP    Nail Care         Current shoe gear:   Tennis shoes         Hemoglobin A1C   Date Value Ref Range Status   11/02/2021 5.9 (H) 4.0 - 5.6 % Final     Comment:     ADA Screening Guidelines:  5.7-6.4%  Consistent with prediabetes  >or=6.5%  Consistent with diabetes    High levels of fetal hemoglobin interfere with the HbA1C  assay. Heterozygous hemoglobin variants (HbS, HgC, etc)do  not significantly interfere with this assay.   However, presence of multiple variants may affect accuracy.     08/13/2020 5.5 4.0 - 5.6 % Final     Comment:     ADA Screening Guidelines:  5.7-6.4%  Consistent with prediabetes  >or=6.5%  Consistent with diabetes  High levels of fetal hemoglobin interfere with the HbA1C  assay. Heterozygous hemoglobin variants (HbS, HgC, etc)do  not significantly interfere with this assay.   However, presence of multiple variants may affect accuracy.     01/17/2020 6.0 (H) 4.0 - 5.6 % Final     Comment:     ADA Screening Guidelines:  5.7-6.4%  Consistent with prediabetes  >or=6.5%  Consistent with diabetes  High levels of fetal hemoglobin " interfere with the HbA1C  assay. Heterozygous hemoglobin variants (HbS, HgC, etc)do  not significantly interfere with this assay.   However, presence of multiple variants may affect accuracy.       Past Medical History:   Diagnosis Date    Allergy     Clotting disorder     Elaquis and APlavix    Deep vein thrombosis     Diabetes mellitus, type 2     Hypertension     Nuclear sclerosis of both eyes 6/9/2017    Renal disorder     Seizures     Stroke     Urinary tract infection        Past Surgical History:   Procedure Laterality Date    CATARACT EXTRACTION W/  INTRAOCULAR LENS IMPLANT Right 10/05/2017    Dr. Tay    CATARACT EXTRACTION W/  INTRAOCULAR LENS IMPLANT Left 10/19/2017    Dr. Tay    CYSTOSCOPY W/ RETROGRADES Bilateral 2/18/2021    Procedure: CYSTOSCOPY, WITH RETROGRADE PYELOGRAM;  Surgeon: JEMMA Styles MD;  Location: [facility] OR;  Service: Urology;  Laterality: Bilateral;  REQUESTING EARLY AS POSSIBE-LO / 2/8/2021 @ 1:13PM  RN Pre Op 2-11-21, Covid NEGATIVE ON  2-17-21.  C A    ESOPHAGOGASTRODUODENOSCOPY N/A 8/17/2020    Procedure: EGD (ESOPHAGOGASTRODUODENOSCOPY);  Surgeon: Desmond Chapman MD;  Location: [facility] ENDO;  Service: Endoscopy;  Laterality: N/A;    EYE SURGERY Bilateral     cataract    FEMORAL ARTERY STENT      FRACTURE SURGERY      KNEE SURGERY      bilateral scope       Family History   Problem Relation Age of Onset    Diabetes Mother     Heart disease Mother     Hypertension Mother     Cataracts Mother     No Known Problems Father     Hypertension Sister     No Known Problems Brother     No Known Problems Daughter     No Known Problems Maternal Aunt     No Known Problems Maternal Uncle     No Known Problems Paternal Aunt     No Known Problems Paternal Uncle     No Known Problems Maternal Grandmother     No Known Problems Maternal Grandfather     No Known Problems Paternal Grandmother     No Known Problems Paternal Grandfather     Amblyopia Neg Hx      Blindness Neg Hx     Cancer Neg Hx     Glaucoma Neg Hx     Macular degeneration Neg Hx     Retinal detachment Neg Hx     Strabismus Neg Hx     Stroke Neg Hx     Thyroid disease Neg Hx        Social History     Socioeconomic History    Marital status: Single   Occupational History    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, etc   Tobacco Use    Smoking status: Never Smoker    Smokeless tobacco: Never Used   Substance and Sexual Activity    Alcohol use: No    Drug use: No   Social History Narrative    Lives with daughter       Current Outpatient Medications   Medication Sig Dispense Refill    amLODIPine (NORVASC) 10 MG tablet Take 1 tablet (10 mg total) by mouth once daily. 90 tablet 3    atorvastatin (LIPITOR) 80 MG tablet Take 1 tablet (80 mg total) by mouth once daily. 90 tablet 3    blood sugar diagnostic Strp To check BG 1 times daily, to use with insurance preferred meter 100 strip 3    blood-glucose meter kit To check BG 1 times daily, to use with insurance preferred meter 1 each 0    calcitRIOL (ROCALTROL) 0.25 MCG Cap Take 1 capsule (0.25 mcg total) by mouth once daily.      cholecalciferol, vitamin D3, (VITAMIN D3) 25 mcg (1,000 unit) capsule Take 1 capsule (1,000 Units total) by mouth once daily. 90 capsule 3    clopidogreL (PLAVIX) 75 mg tablet Take 1 tablet (75 mg total) by mouth once daily. 90 tablet 3    clotrimazole-betamethasone 1-0.05% (LOTRISONE) cream Apply topically 2 (two) times daily. 15 g 0    ELIQUIS 5 mg Tab Take 1 tablet by mouth twice daily 180 tablet 0    ergocalciferol, vitamin D2, (VITAMIN D ORAL) Take 1 mcg by mouth.      finasteride (PROSCAR) 5 mg tablet Take 1 tablet (5 mg total) by mouth once daily. 90 tablet 3    heparin sodium,porcine (HEPARIN, PORCINE,) 1,000 unit/mL Soln injection by Intra-Catheter route.      hydrocortisone (WESTCORT) 0.2 % cream Apply topically 2 (two) times daily. 15 g 0    labetaloL (NORMODYNE) 100 MG tablet  Take 1 tablet (100 mg total) by mouth once daily at 6am. 90 tablet 3    lancets Misc To check BG 1 times daily, to use with insurance preferred meter 100 each 4    levETIRAcetam (KEPPRA) 250 MG Tab Take 1 tablet (250 mg total) by mouth 2 (two) times daily on Tuesday, Thursday, Saturday & Sunday. Take 2 tablets (500mg total) by mouth in the morning and 1 tablet (250mg total) in the evening on dialysis days (Monday, Wednesday, & Friday) 80 tablet 11    loperamide (IMODIUM) 1 mg/5 mL solution Take 4 mg by mouth.      methoxy peg-epoetin beta (MIRCERA INJ) 30 mcg.      RENVELA 800 mg Tab Take 1 tablet (800 mg total) by mouth 3 (three) times daily with meals. 90 tablet 0    sodium chloride 0.9% SolP 1,000 mL with heparin (porcine) 10,000 unit/mL Soln 90,000 Units by Intra-Catheter route.      sodium chloride 0.9% SolP 100 mL with iron sucrose 100 mg iron/5 mL Soln 100 mg 50 mg.      tamsulosin (FLOMAX) 0.4 mg Cap Take 2 capsules (0.8 mg total) by mouth once daily. 180 capsule 3     No current facility-administered medications for this visit.       Review of patient's allergies indicates:   Allergen Reactions    Penicillins Hives       Review of Systems   Constitutional: Negative for chills and fever.   Cardiovascular: Positive for leg swelling. Negative for chest pain and claudication.   Respiratory: Negative for cough and shortness of breath.    Skin: Positive for dry skin and suspicious lesions (corns/callus).   Musculoskeletal: Positive for muscle weakness.   Gastrointestinal: Negative for nausea and vomiting.   Neurological: Positive for focal weakness (right sided), numbness and sensory change. Negative for paresthesias.   Psychiatric/Behavioral: Negative for altered mental status.           Objective:      Physical Exam  Vitals reviewed.   Constitutional:       Appearance: He is well-developed.   HENT:      Head: Normocephalic.   Cardiovascular:      Pulses:           Dorsalis pedis pulses are 2+ on the  right side and 2+ on the left side.        Posterior tibial pulses are 1+ on the right side and 1+ on the left side.      Comments: CRT < 3 sec to tips of toes. 2+ pitting edema noted to b/l LE. No vericosities noted to b/l LEs.     Pulmonary:      Effort: No respiratory distress.   Musculoskeletal:      Comments: Gastrocnemius equinus noted to b/l ankles with decreased DF noted on exam. MMT 5/5 in DF/PF/Inv/Ev resistance with no reproduction of pain in any direction Right, 3/5 left. Passive range of motion of ankle and pedal joints is painless. Adequate pedal joint ROM.     Rigid hammertoe contractures noted to toes 2-4 R-asymptomatic.     Limited hallux MTPJ ROM with plantarflexion contracture of b/l hallux IPJ, rigid R semi-reducible L.      A mass of size 2 cm is felt in the right dorsal foot.     Skin:     General: Skin is warm and dry.      Findings: Lesion present. No ecchymosis or erythema.      Comments: No open lesions, lacerations or wounds noted. Nails are dystrophic, elongated, thickened with yellow/brown discoloration to R 1 and L 1-5. Remaining toenails normotrophic.  Interdigital spaces clean, dry and intact b/l. No erythema noted to b/l foot. Skin texture thin, shiny, atrophic. Pedal hair absent. Toes cool to touch b/l.     Pre ulcerative callus left heel.    Neurological:      Mental Status: He is alert and oriented to person, place, and time.      Sensory: Sensory deficit present.      Comments: Light touch, proprioception, and sharp/dull sensation are all intact bilaterally. Protective threshold with the [place]-Wienstein monofilament is intact bilaterally. Vibratory sensation diminished b/l distal foot.      Psychiatric:         Behavior: Behavior normal.         Thought Content: Thought content normal.         Judgment: Judgment normal.       Assessment:       Encounter Diagnoses   Name Primary?    Type II diabetes mellitus with neurological manifestations Yes    Mass of foot, right           Plan:       Miguel was seen today for diabetes mellitus and nail care.    Diagnoses and all orders for this visit:    Type II diabetes mellitus with neurological manifestations    Mass of foot, right      I counseled the patient on his conditions, their implications and medical management.    - Shoe inspection. Diabetic Foot Education. Patient reminded of the importance of good nutrition and blood sugar control to help prevent podiatric complications of diabetes. Patient instructed on proper foot hygeine. We discussed wearing proper shoe gear, daily foot inspections, never walking without protective shoe gear, never putting sharp instruments to feet, routine podiatric nail visits every 2-3 months.      Discussed regular and routine moisturizer to skin of both feet to help improve dry skin. Advised to apply twice daily until resolution of symptoms. Avoid between toes. Recommended Gold Bond Foot cream or Diabetic cream.      - I discussed condition and treatment which includes conservative and surgical options for the mass to the foot.  All questions were answered.     Since the cyst is not painful and is not interfering with daily function, the patient would like to continue monitoring it and will return to the clinic when patient wants intervention.  I provided reassurance.          Sabi Douglas DPM

## 2022-08-22 ENCOUNTER — TELEPHONE (OUTPATIENT)
Dept: TRANSPLANT | Facility: CLINIC | Age: 67
End: 2022-08-22
Payer: MEDICARE

## 2022-08-23 DIAGNOSIS — I82.431 ACUTE DEEP VEIN THROMBOSIS (DVT) OF POPLITEAL VEIN OF RIGHT LOWER EXTREMITY: ICD-10-CM

## 2022-08-23 RX ORDER — APIXABAN 5 MG/1
TABLET, FILM COATED ORAL
Qty: 180 TABLET | Refills: 3 | Status: SHIPPED | OUTPATIENT
Start: 2022-08-23 | End: 2023-08-28 | Stop reason: SDUPTHER

## 2022-08-23 NOTE — TELEPHONE ENCOUNTER
----- Message from Alejandrina Segundo sent at 8/23/2022  9:33 AM CDT -----  Regarding: refill  Type: RX Refill Request    Who Called:Miguel     Refill or New Rx:refill     RX Name and Strength: ELIQUIS 5 mg Tab    Is this a 30 day or 90 day RX: 30 day    Preferred Pharmacy with phone number: [facility]  [facility] [place] - [address] [facility]    Would the patient rather a call back or a response via [facility]? Call back     Best Call Back Number:[phone]

## 2022-08-26 ENCOUNTER — PES CALL (OUTPATIENT)
Dept: ADMINISTRATIVE | Facility: CLINIC | Age: 67
End: 2022-08-26
Payer: MEDICARE

## 2022-08-29 ENCOUNTER — TELEPHONE (OUTPATIENT)
Dept: FAMILY MEDICINE | Facility: CLINIC | Age: 67
End: 2022-08-29
Payer: MEDICARE

## 2022-08-29 ENCOUNTER — TELEPHONE (OUTPATIENT)
Dept: TRANSPLANT | Facility: CLINIC | Age: 67
End: 2022-08-29
Payer: MEDICARE

## 2022-08-29 NOTE — TELEPHONE ENCOUNTER
----- Message from Vidya Jhoan sent at 8/29/2022  9:59 AM CDT -----  Type: Patient Call Back    Who called: self     What is the request in detail: pts is requesting to have the correct paperwork sent to [facility] for his shoes. Please call    Can the clinic reply by [facility]? No     Would the patient rather a call back or a response via [facility]? call    Best call back number: .[phone] (home)

## 2022-08-30 ENCOUNTER — TELEPHONE (OUTPATIENT)
Dept: ADMINISTRATIVE | Facility: CLINIC | Age: 67
End: 2022-08-30
Payer: MEDICARE

## 2022-09-06 ENCOUNTER — OFFICE VISIT (OUTPATIENT)
Dept: NEUROLOGY | Facility: CLINIC | Age: 67
End: 2022-09-06
Payer: MEDICARE

## 2022-09-06 VITALS
BODY MASS INDEX: 25.16 KG/M2 | HEIGHT: 68 IN | SYSTOLIC BLOOD PRESSURE: 138 MMHG | DIASTOLIC BLOOD PRESSURE: 72 MMHG | WEIGHT: 166 LBS | HEART RATE: 59 BPM

## 2022-09-06 DIAGNOSIS — I69.398 SEIZURE DISORDER AS SEQUELA OF CEREBROVASCULAR ACCIDENT: Chronic | ICD-10-CM

## 2022-09-06 DIAGNOSIS — G40.909 SEIZURE DISORDER AS SEQUELA OF CEREBROVASCULAR ACCIDENT: Chronic | ICD-10-CM

## 2022-09-06 DIAGNOSIS — Z86.73 HISTORY OF CVA (CEREBROVASCULAR ACCIDENT): Primary | ICD-10-CM

## 2022-09-06 PROCEDURE — 3008F BODY MASS INDEX DOCD: CPT | Mod: CPTII,S$GLB,,

## 2022-09-06 PROCEDURE — 1159F MED LIST DOCD IN RCRD: CPT | Mod: CPTII,S$GLB,,

## 2022-09-06 PROCEDURE — 1101F PT FALLS ASSESS-DOCD LE1/YR: CPT | Mod: CPTII,S$GLB,,

## 2022-09-06 PROCEDURE — 3288F PR FALLS RISK ASSESSMENT DOCUMENTED: ICD-10-PCS | Mod: CPTII,S$GLB,,

## 2022-09-06 PROCEDURE — 99999 PR PBB SHADOW E&M-EST. PATIENT-LVL IV: CPT | Mod: PBBFAC,,,

## 2022-09-06 PROCEDURE — 99203 PR OFFICE/OUTPT VISIT, NEW, LEVL III, 30-44 MIN: ICD-10-PCS | Mod: S$GLB,,,

## 2022-09-06 PROCEDURE — 3078F DIAST BP <80 MM HG: CPT | Mod: CPTII,S$GLB,,

## 2022-09-06 PROCEDURE — 3075F PR MOST RECENT SYSTOLIC BLOOD PRESS GE 130-139MM HG: ICD-10-PCS | Mod: CPTII,S$GLB,,

## 2022-09-06 PROCEDURE — 99203 OFFICE O/P NEW LOW 30 MIN: CPT | Mod: S$GLB,,,

## 2022-09-06 PROCEDURE — 3078F PR MOST RECENT DIASTOLIC BLOOD PRESSURE < 80 MM HG: ICD-10-PCS | Mod: CPTII,S$GLB,,

## 2022-09-06 PROCEDURE — 1101F PR PT FALLS ASSESS DOC 0-1 FALLS W/OUT INJ PAST YR: ICD-10-PCS | Mod: CPTII,S$GLB,,

## 2022-09-06 PROCEDURE — 1126F AMNT PAIN NOTED NONE PRSNT: CPT | Mod: CPTII,S$GLB,,

## 2022-09-06 PROCEDURE — 1160F PR REVIEW ALL MEDS BY PRESCRIBER/CLIN PHARMACIST DOCUMENTED: ICD-10-PCS | Mod: CPTII,S$GLB,,

## 2022-09-06 PROCEDURE — 1126F PR PAIN SEVERITY QUANTIFIED, NO PAIN PRESENT: ICD-10-PCS | Mod: CPTII,S$GLB,,

## 2022-09-06 PROCEDURE — 3075F SYST BP GE 130 - 139MM HG: CPT | Mod: CPTII,S$GLB,,

## 2022-09-06 PROCEDURE — 3288F FALL RISK ASSESSMENT DOCD: CPT | Mod: CPTII,S$GLB,,

## 2022-09-06 PROCEDURE — 1159F PR MEDICATION LIST DOCUMENTED IN MEDICAL RECORD: ICD-10-PCS | Mod: CPTII,S$GLB,,

## 2022-09-06 PROCEDURE — 3008F PR BODY MASS INDEX (BMI) DOCUMENTED: ICD-10-PCS | Mod: CPTII,S$GLB,,

## 2022-09-06 PROCEDURE — 99999 PR PBB SHADOW E&M-EST. PATIENT-LVL IV: ICD-10-PCS | Mod: PBBFAC,,,

## 2022-09-06 PROCEDURE — 1160F RVW MEDS BY RX/DR IN RCRD: CPT | Mod: CPTII,S$GLB,,

## 2022-09-06 NOTE — PROGRESS NOTES
Subjective:       Patient ID: Miguel Moore is a 67 y.o. male.    Chief Complaint:  Stroke and Seizures      History of Present Illness  67 year old male who presents today for evaluation of seizures and history of CVA. Past medical history below. He states he had a stroke over 5 years ago. The stroke left him with right side hemiparesis. He is left handed. He is currently in a wheelchair for this visit. He reports that he is still able to ambulate short distances and complete all ADLs. He further reports having a seizure after he had a stroke. He denies any seizures since then. He has recently started retaking Keppra. He is going to Dialysis [facility]. Reports tolerating that well. He reports compliance with all other medications.     Past Medical History:   Diagnosis Date    Allergy     Clotting disorder     Elaquis and APlavix    Deep vein thrombosis     Diabetes mellitus, type 2     Hypertension     Nuclear sclerosis of both eyes 6/9/2017    Renal disorder     Seizures     Stroke     Urinary tract infection        Past Surgical History:   Procedure Laterality Date    CATARACT EXTRACTION W/  INTRAOCULAR LENS IMPLANT Right 10/05/2017    Dr. Tay    CATARACT EXTRACTION W/  INTRAOCULAR LENS IMPLANT Left 10/19/2017    Dr. Tay    CYSTOSCOPY W/ RETROGRADES Bilateral 2/18/2021    Procedure: CYSTOSCOPY, WITH RETROGRADE PYELOGRAM;  Surgeon: JEMMA Styles MD;  Location: [facility] OR;  Service: Urology;  Laterality: Bilateral;  REQUESTING EARLY AS POSSIBE-LO / 2/8/2021 @ 1:13PM  RN Pre Op 2-11-21, Covid NEGATIVE ON  2-17-21.  C A    ESOPHAGOGASTRODUODENOSCOPY N/A 8/17/2020    Procedure: EGD (ESOPHAGOGASTRODUODENOSCOPY);  Surgeon: Desmond Chapman MD;  Location: [facility] ENDO;  Service: Endoscopy;  Laterality: N/A;    EYE SURGERY Bilateral     cataract    FEMORAL ARTERY STENT      FRACTURE SURGERY      KNEE SURGERY      bilateral scope       Family History   Problem Relation Age of Onset    Diabetes Mother     Heart  disease Mother     Hypertension Mother     Cataracts Mother     No Known Problems Father     Hypertension Sister     No Known Problems Brother     No Known Problems Daughter     No Known Problems Maternal Aunt     No Known Problems Maternal Uncle     No Known Problems Paternal Aunt     No Known Problems Paternal Uncle     No Known Problems Maternal Grandmother     No Known Problems Maternal Grandfather     No Known Problems Paternal Grandmother     No Known Problems Paternal Grandfather     Amblyopia Neg Hx     Blindness Neg Hx     Cancer Neg Hx     Glaucoma Neg Hx     Macular degeneration Neg Hx     Retinal detachment Neg Hx     Strabismus Neg Hx     Stroke Neg Hx     Thyroid disease Neg Hx        Social History     Socioeconomic History    Marital status: Single   Occupational History    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 90 tablet 3    atorvastatin (LIPITOR) 80 MG tablet Take 1 tablet (80 mg total) by mouth once daily. 90 tablet 3    blood sugar diagnostic Strp To check BG 1 times daily, to use with insurance preferred meter 100 strip 3    blood-glucose meter kit To check BG 1 times daily, to use with insurance preferred meter 1 each 0    calcitRIOL (ROCALTROL) 0.25 MCG Cap Take 1 capsule (0.25 mcg total) by mouth once daily.      cholecalciferol, vitamin D3, (VITAMIN D3) 25 mcg (1,000 unit) capsule Take 1 capsule (1,000 Units total) by mouth once daily. 90 capsule 3    clopidogreL (PLAVIX) 75 mg tablet Take 1 tablet (75 mg total) by mouth once daily. 90 tablet 3    clotrimazole-betamethasone 1-0.05% (LOTRISONE) cream Apply topically 2 (two) times daily.  15 g 0    ELIQUIS 5 mg Tab Take 1 tablet by mouth twice daily 180 tablet 3    ergocalciferol, vitamin D2, (VITAMIN D ORAL) Take 1 mcg by mouth.      finasteride (PROSCAR) 5 mg tablet Take 1 tablet (5 mg total) by mouth once daily. 90 tablet 3    heparin sodium,porcine (HEPARIN, PORCINE,) 1,000 unit/mL Soln injection by Intra-Catheter route.      hydrocortisone (WESTCORT) 0.2 % cream Apply topically 2 (two) times daily. 15 g 0    labetaloL (NORMODYNE) 100 MG tablet Take 1 tablet (100 mg total) by mouth once daily at 6am. 90 tablet 3    lancets Misc To check BG 1 times daily, to use with insurance preferred meter 100 each 4    levETIRAcetam (KEPPRA) 250 MG Tab Take 1 tablet (250 mg total) by mouth 2 (two) times daily on Tuesday, Thursday, Saturday & Sunday. Take 2 tablets (500mg total) by mouth in the morning and 1 tablet (250mg total) in the evening on dialysis days (Monday, Wednesday, & Friday) 80 tablet 11    loperamide (IMODIUM) 1 mg/5 mL solution Take 4 mg by mouth.      methoxy peg-epoetin beta (MIRCERA INJ) 30 mcg.      RENVELA 800 mg Tab Take 1 tablet (800 mg total) by mouth 3 (three) times daily with meals. 90 tablet 0    sodium chloride 0.9% SolP 1,000 mL with heparin (porcine) 10,000 unit/mL Soln 90,000 Units by Intra-Catheter route.      sodium chloride 0.9% SolP 100 mL with iron sucrose 100 mg iron/5 mL Soln 100 mg 50 mg.      tamsulosin (FLOMAX) 0.4 mg Cap Take 2 capsules (0.8 mg total) by mouth once daily. 180 capsule 3     No current facility-administered medications for this visit.       Review of patient's allergies indicates:   Allergen Reactions    Ace inhibitors      Hyperkalemia 8/2018  Other reaction(s): Unknown    Penicillins Hives    Tizanidine      Felt hot        Review of Systems  Review of Systems   Constitutional: Negative.    HENT: Negative.     Eyes: Negative.    Respiratory: Negative.     Cardiovascular: Negative.    Gastrointestinal: Negative.    Endocrine: Negative.    Genitourinary:  Negative.    Musculoskeletal: Negative.    Skin: Negative.    Neurological:  Positive for seizures.   Psychiatric/Behavioral: Negative.       Objective:      Neurologic Exam     Mental Status   Oriented to person, place, and time.   Attention: normal. Concentration: normal.   Speech: speech is normal   Level of consciousness: alert  Knowledge: good.     Cranial Nerves   Cranial nerves II through XII intact.     CN II   Visual fields full to confrontation.     CN III, IV, VI   Pupils are equal, round, and reactive to light.  Extraocular motions are normal.   CN III: no CN III palsy  CN VI: no CN VI palsy  Nystagmus: none   Diplopia: none    CN V   Facial sensation intact.     CN VII   Right facial weakness: peripheral    CN VIII   CN VIII normal.     CN IX, X   CN IX normal.   CN X normal.     CN XI   CN XI normal.     CN XII   CN XII normal.     Motor Exam   Muscle bulk: decreased  Overall muscle tone: decreased  Right arm tone: spastic  Left arm tone: normal  Right arm pronator drift: absent  Left arm pronator drift: absent  Right leg tone: spastic  Left leg tone: normal    Strength   Right biceps: 2/5  Left biceps: 5/5  Right triceps: 2/5  Left triceps: 5/5  Right quadriceps: 4/5  Left quadriceps: 5/5  Right anterior tibial: 4/5  Left anterior tibial: 5/5  Right posterior tibial: 4/5  Left posterior tibial: 5/5    Sensory Exam   Light touch normal.     Gait, Coordination, and Reflexes     Tremor   Resting tremor: absent  Intention tremor: absent  Action tremor: absent    Reflexes   Right brachioradialis: 1+  Left brachioradialis: 2+  Right biceps: 1+  Left biceps: 2+  Right triceps: 1+  Left triceps: 2+  Right patellar: 1+  Left patellar: 2+  Right achilles: 1+  Left achilles: 2+  Right : 0  Left : 2+    Physical Exam  HENT:      Head: Normocephalic and atraumatic.   Eyes:      Extraocular Movements: EOM normal.      Pupils: Pupils are equal, round, and reactive to light.   Cardiovascular:      Rate and  Rhythm: Normal rate.   Pulmonary:      Effort: Pulmonary effort is normal.   Skin:     General: Skin is warm and dry.   Neurological:      Mental Status: He is alert and oriented to person, place, and time.      Cranial Nerves: Cranial nerves 2-12 are intact.      Sensory: Sensation is intact.      Motor: Weakness present.      Coordination: Coordination is intact.      Deep Tendon Reflexes: Reflexes abnormal.      Reflex Scores:       Tricep reflexes are 1+ on the right side and 2+ on the left side.       Bicep reflexes are 1+ on the right side and 2+ on the left side.       Brachioradialis reflexes are 1+ on the right side and 2+ on the left side.       Patellar reflexes are 1+ on the right side and 2+ on the left side.       Achilles reflexes are 1+ on the right side and 2+ on the left side.  Psychiatric:         Mood and Affect: Mood normal.         Speech: Speech normal.         Behavior: Behavior is cooperative.         Assessment and Plan:       1. Seizure disorder as sequela of cerebrovascular accident  - continue taking Keppra    2. History of CVA (cerebrovascular accident)  -continue taking Lipitor, Eliquis, and Norvasc  -educated the patient on the importance of compliance with all medications  -encouraged healthy lifestyle with healthy diet and exercise as tolerated   -Educated patient on s/s of stroke such as facial droop, speech changes, numbness/tingling, arm/leg weakness. Emphasized the importance of time and brain loss. If s/s of stroke are present and suspected patient/family educated to call 911. Patient and family verbalized understanding

## 2022-09-07 ENCOUNTER — PES CALL (OUTPATIENT)
Dept: ADMINISTRATIVE | Facility: CLINIC | Age: 67
End: 2022-09-07
Payer: MEDICARE

## 2022-09-15 ENCOUNTER — OFFICE VISIT (OUTPATIENT)
Dept: UROLOGY | Facility: CLINIC | Age: 67
End: 2022-09-15
Payer: MEDICARE

## 2022-09-15 VITALS — WEIGHT: 171.06 LBS | BODY MASS INDEX: 25.92 KG/M2 | HEIGHT: 68 IN

## 2022-09-15 DIAGNOSIS — R39.89 SUSPECTED UTI: Primary | ICD-10-CM

## 2022-09-15 DIAGNOSIS — N40.1 BPH WITH OBSTRUCTION/LOWER URINARY TRACT SYMPTOMS: ICD-10-CM

## 2022-09-15 DIAGNOSIS — R35.1 NOCTURIA MORE THAN TWICE PER NIGHT: ICD-10-CM

## 2022-09-15 DIAGNOSIS — N13.8 BPH WITH OBSTRUCTION/LOWER URINARY TRACT SYMPTOMS: ICD-10-CM

## 2022-09-15 LAB
BILIRUB SERPL-MCNC: NEGATIVE MG/DL
BLOOD URINE, POC: 250
COLOR, POC UA: YELLOW
GLUCOSE UR QL STRIP: NORMAL
KETONES UR QL STRIP: NEGATIVE
LEUKOCYTE ESTERASE URINE, POC: NORMAL
NITRITE, POC UA: NEGATIVE
PH, POC UA: 5
PROTEIN, POC: NORMAL
SPECIFIC GRAVITY, POC UA: 1015
UROBILINOGEN, POC UA: NORMAL

## 2022-09-15 PROCEDURE — 1159F PR MEDICATION LIST DOCUMENTED IN MEDICAL RECORD: ICD-10-PCS | Mod: CPTII,S$GLB,, | Performed by: UROLOGY

## 2022-09-15 PROCEDURE — 1160F PR REVIEW ALL MEDS BY PRESCRIBER/CLIN PHARMACIST DOCUMENTED: ICD-10-PCS | Mod: CPTII,S$GLB,, | Performed by: UROLOGY

## 2022-09-15 PROCEDURE — 1159F MED LIST DOCD IN RCRD: CPT | Mod: CPTII,S$GLB,, | Performed by: UROLOGY

## 2022-09-15 PROCEDURE — 87088 URINE BACTERIA CULTURE: CPT | Performed by: UROLOGY

## 2022-09-15 PROCEDURE — 99214 PR OFFICE/OUTPT VISIT, EST, LEVL IV, 30-39 MIN: ICD-10-PCS | Mod: S$GLB,,, | Performed by: UROLOGY

## 2022-09-15 PROCEDURE — 87086 URINE CULTURE/COLONY COUNT: CPT | Performed by: UROLOGY

## 2022-09-15 PROCEDURE — 99214 OFFICE O/P EST MOD 30 MIN: CPT | Mod: S$GLB,,, | Performed by: UROLOGY

## 2022-09-15 PROCEDURE — 1101F PR PT FALLS ASSESS DOC 0-1 FALLS W/OUT INJ PAST YR: ICD-10-PCS | Mod: CPTII,S$GLB,, | Performed by: UROLOGY

## 2022-09-15 PROCEDURE — 99999 PR PBB SHADOW E&M-EST. PATIENT-LVL IV: ICD-10-PCS | Mod: PBBFAC,,, | Performed by: UROLOGY

## 2022-09-15 PROCEDURE — 87077 CULTURE AEROBIC IDENTIFY: CPT | Performed by: UROLOGY

## 2022-09-15 PROCEDURE — 3288F FALL RISK ASSESSMENT DOCD: CPT | Mod: CPTII,S$GLB,, | Performed by: UROLOGY

## 2022-09-15 PROCEDURE — 3288F PR FALLS RISK ASSESSMENT DOCUMENTED: ICD-10-PCS | Mod: CPTII,S$GLB,, | Performed by: UROLOGY

## 2022-09-15 PROCEDURE — 1101F PT FALLS ASSESS-DOCD LE1/YR: CPT | Mod: CPTII,S$GLB,, | Performed by: UROLOGY

## 2022-09-15 PROCEDURE — 1126F PR PAIN SEVERITY QUANTIFIED, NO PAIN PRESENT: ICD-10-PCS | Mod: CPTII,S$GLB,, | Performed by: UROLOGY

## 2022-09-15 PROCEDURE — 99999 PR PBB SHADOW E&M-EST. PATIENT-LVL IV: CPT | Mod: PBBFAC,,, | Performed by: UROLOGY

## 2022-09-15 PROCEDURE — 81001 POCT URINALYSIS, DIPSTICK OR TABLET REAGENT, AUTOMATED, WITH MICROSCOP: ICD-10-PCS | Mod: S$GLB,,, | Performed by: UROLOGY

## 2022-09-15 PROCEDURE — 3008F PR BODY MASS INDEX (BMI) DOCUMENTED: ICD-10-PCS | Mod: CPTII,S$GLB,, | Performed by: UROLOGY

## 2022-09-15 PROCEDURE — 1160F RVW MEDS BY RX/DR IN RCRD: CPT | Mod: CPTII,S$GLB,, | Performed by: UROLOGY

## 2022-09-15 PROCEDURE — 87186 SC STD MICRODIL/AGAR DIL: CPT | Performed by: UROLOGY

## 2022-09-15 PROCEDURE — 3008F BODY MASS INDEX DOCD: CPT | Mod: CPTII,S$GLB,, | Performed by: UROLOGY

## 2022-09-15 PROCEDURE — 81001 URINALYSIS AUTO W/SCOPE: CPT | Mod: S$GLB,,, | Performed by: UROLOGY

## 2022-09-15 PROCEDURE — 1126F AMNT PAIN NOTED NONE PRSNT: CPT | Mod: CPTII,S$GLB,, | Performed by: UROLOGY

## 2022-09-15 RX ORDER — CIPROFLOXACIN 500 MG/1
500 TABLET ORAL 2 TIMES DAILY
Qty: 14 TABLET | Refills: 0 | Status: SHIPPED | OUTPATIENT
Start: 2022-09-15 | End: 2022-09-17 | Stop reason: ALTCHOICE

## 2022-09-15 NOTE — PROGRESS NOTES
Subjective:       Patient ID: Miguel Moore is a 67 y.o. male who was last seen in this office 8/3/2022    Chief Complaint:   Chief Complaint   Patient presents with    Urinary Tract Infection     Discolored urine     Hematuria  Patient complains of gross hematuria. Onset of hematuria was a few months ago and was gradual in onset. There is not a history of nephrolithiasis. There is not a history of urologic trauma. Other urologic symptoms include slow stream, hesitancy, intermittency, nocturia. Patient admits to history of no risk factors for cancer. Patient denies history of Agent Orange exposure, chronic Nunez catheter,  surgeries, occupational exposure, sexually transmitted diseases, tobacco use, trauma and urolithiasis. Prior workup has been none.  He had a CT in January when he was hospitalized with renal failure.    He had a Cystoscopy with simple UDS in 2017 by Dr. Chambers.  This showed an enlarged prostate and incomplete bladder emptying.    He is now on MWF dialysis.    09/15/2022  He had a UTI in August 2022.  He had a negative prostate biopsy in February 2021 for a PSA 10.2.  He thinks he has a UTI due to a strong smell and dark color.     ACTIVE MEDICAL ISSUES:  Patient Active Problem List   Diagnosis    Benign essential HTN    Pure hypercholesterolemia    Controlled type 2 diabetes mellitus with chronic kidney disease on chronic dialysis, with long-term current use of insulin    BPH (benign prostatic hyperplasia) 2/18/21 prostate bx normal    Seizure disorder as sequela of cerebrovascular accident    Hemiplegia and hemiparesis following cerebral infarction affecting right dominant side    Microcytosis 9/2019 labs c/w AoCD and AoCKD    ESRD (end stage renal disease)    Nuclear sclerosis, left    Cortical cataract of both eyes    DM type 2 without retinopathy    Secondary hyperparathyroidism of renal origin    Vitamin D deficiency    Right sided weakness    Senile nuclear sclerosis    CME (cystoid  macular edema), bilateral    Refractive error    History of stroke    Type 2 diabetes mellitus with diabetic neuropathy, with long-term current use of insulin    Nephrotic syndrome    Anasarca    Hypocalcemia    Candida parapsilosis infection while hospitalized 8/2020    Acute deep vein thrombosis (DVT) of popliteal vein of right lower extremity on outside E US 9/21/2020, unprovoked; eliquis    Pancytopenia    Elevated liver enzymes    Pulmonary nodule 1/2021 4 mm nodule.    Hematuria    Elevated PSA 2/18/21 prostate bx normal    Anemia in chronic kidney disease    Heel ulceration, right, with fat layer exposed    Pseudophakia       ALLERGIES AND MEDICATIONS: updated and reviewed.  Review of patient's allergies indicates:   Allergen Reactions    Ace inhibitors      Hyperkalemia 8/2018  Other reaction(s): Unknown    Penicillins Hives    Tizanidine      Felt hot     Current Outpatient Medications   Medication Sig    amLODIPine (NORVASC) 10 MG tablet Take 1 tablet (10 mg total) by mouth once daily.    atorvastatin (LIPITOR) 80 MG tablet Take 1 tablet (80 mg total) by mouth once daily.    blood sugar diagnostic Strp To check BG 1 times daily, to use with insurance preferred meter    blood-glucose meter kit To check BG 1 times daily, to use with insurance preferred meter    calcitRIOL (ROCALTROL) 0.25 MCG Cap Take 1 capsule (0.25 mcg total) by mouth once daily.    cholecalciferol, vitamin D3, (VITAMIN D3) 25 mcg (1,000 unit) capsule Take 1 capsule (1,000 Units total) by mouth once daily.    clopidogreL (PLAVIX) 75 mg tablet Take 1 tablet (75 mg total) by mouth once daily.    clotrimazole-betamethasone 1-0.05% (LOTRISONE) cream Apply topically 2 (two) times daily.    ELIQUIS 5 mg Tab Take 1 tablet by mouth twice daily    ergocalciferol, vitamin D2, (VITAMIN D ORAL) Take 1 mcg by mouth.    finasteride (PROSCAR) 5 mg tablet Take 1 tablet (5 mg total) by mouth once daily.    heparin sodium,porcine (HEPARIN, PORCINE,) 1,000  unit/mL Soln injection by Intra-Catheter route.    hydrocortisone (WESTCORT) 0.2 % cream Apply topically 2 (two) times daily.    labetaloL (NORMODYNE) 100 MG tablet Take 1 tablet (100 mg total) by mouth once daily at 6am.    lancets Misc To check BG 1 times daily, to use with insurance preferred meter    levETIRAcetam (KEPPRA) 250 MG Tab Take 1 tablet (250 mg total) by mouth 2 (two) times daily on Tuesday, Thursday, Saturday & Sunday. Take 2 tablets (500mg total) by mouth in the morning and 1 tablet (250mg total) in the evening on dialysis days (Monday, Wednesday, & Friday)    loperamide (IMODIUM) 1 mg/5 mL solution Take 4 mg by mouth.    methoxy peg-epoetin beta (MIRCERA INJ) 30 mcg.    RENVELA 800 mg Tab Take 1 tablet (800 mg total) by mouth 3 (three) times daily with meals.    sodium chloride 0.9% SolP 1,000 mL with heparin (porcine) 10,000 unit/mL Soln 90,000 Units by Intra-Catheter route.    sodium chloride 0.9% SolP 100 mL with iron sucrose 100 mg iron/5 mL Soln 100 mg 50 mg.    tamsulosin (FLOMAX) 0.4 mg Cap Take 2 capsules (0.8 mg total) by mouth once daily.    ciprofloxacin HCl (CIPRO) 500 MG tablet Take 1 tablet (500 mg total) by mouth 2 (two) times daily. for 7 days     No current facility-administered medications for this visit.       Review of Systems   Constitutional:  Negative for activity change, fatigue, fever and unexpected weight change.   HENT:  Negative for congestion.    Eyes:  Negative for redness.   Respiratory:  Negative for chest tightness and shortness of breath.    Cardiovascular:  Negative for chest pain and leg swelling.   Gastrointestinal:  Negative for abdominal pain, constipation, diarrhea, nausea and vomiting.   Genitourinary:  Negative for dysuria, flank pain, frequency, hematuria, penile pain, penile swelling, scrotal swelling, testicular pain and urgency.   Musculoskeletal:  Negative for arthralgias and back pain.   Neurological:  Negative for dizziness and light-headedness.  "  Psychiatric/Behavioral:  Negative for behavioral problems and confusion. The patient is not nervous/anxious.    All other systems reviewed and are negative.    Objective:      Vitals:    09/15/22 1141   Weight: 77.6 kg (171 lb 1.2 oz)   Height: 5' 8" (1.727 m)     Physical Exam  Vitals and nursing note reviewed.   Constitutional:       Appearance: He is well-developed.   HENT:      Head: Normocephalic.   Eyes:      Conjunctiva/sclera: Conjunctivae normal.   Neck:      Thyroid: No thyromegaly.      Trachea: No tracheal deviation.   Cardiovascular:      Rate and Rhythm: Normal rate.      Heart sounds: Normal heart sounds.   Pulmonary:      Effort: Pulmonary effort is normal. No respiratory distress.      Breath sounds: Normal breath sounds. No wheezing.   Abdominal:      General: Bowel sounds are normal.      Palpations: Abdomen is soft.      Tenderness: There is no abdominal tenderness. There is no rebound.      Hernia: No hernia is present.   Musculoskeletal:         General: No tenderness. Normal range of motion.      Cervical back: Normal range of motion and neck supple.   Lymphadenopathy:      Cervical: No cervical adenopathy.   Skin:     General: Skin is warm and dry.      Findings: No erythema or rash.   Neurological:      Mental Status: He is alert and oriented to person, place, and time.   Psychiatric:         Behavior: Behavior normal.         Thought Content: Thought content normal.         Judgment: Judgment normal.       Urine dipstick shows positive for leukocytes.  Micro exam: 50 WBC's per HPF and 250 RBC's per HPF.    Assessment:       1. Suspected UTI    2. BPH with obstruction/lower urinary tract symptoms    3. Nocturia more than twice per night          Plan:       1. Suspected UTI    - Urine culture  - POCT urinalysis, dipstick or tablet reag  - ciprofloxacin HCl (CIPRO) 500 MG tablet; Take 1 tablet (500 mg total) by mouth 2 (two) times daily. for 7 days  Dispense: 14 tablet; Refill: 0    2. BPH " with obstruction/lower urinary tract symptoms  Need to recheck PSA when he does not have an infection    - US Retroperitoneal Complete (Kidney and; Future    3. Nocturia more than twice per night            Follow up in about 6 weeks (around 10/27/2022) for Follow up, Review X-ray.

## 2022-09-17 ENCOUNTER — TELEPHONE (OUTPATIENT)
Dept: UROLOGY | Facility: HOSPITAL | Age: 67
End: 2022-09-17
Payer: MEDICARE

## 2022-09-17 DIAGNOSIS — R39.89 SUSPECTED UTI: Primary | ICD-10-CM

## 2022-09-17 LAB — BACTERIA UR CULT: ABNORMAL

## 2022-09-17 RX ORDER — NITROFURANTOIN 25; 75 MG/1; MG/1
100 CAPSULE ORAL 2 TIMES DAILY
Qty: 20 CAPSULE | Refills: 0 | Status: SHIPPED | OUTPATIENT
Start: 2022-09-17 | End: 2022-09-27

## 2022-09-19 ENCOUNTER — TELEPHONE (OUTPATIENT)
Dept: ADMINISTRATIVE | Facility: CLINIC | Age: 67
End: 2022-09-19
Payer: MEDICARE

## 2022-09-19 NOTE — TELEPHONE ENCOUNTER
Called pt, informed pt I was calling to remind pt of his in office EAWV on 9/20/22; clinic location provided to patient; pt confirmed appointment

## 2022-09-26 ENCOUNTER — TELEPHONE (OUTPATIENT)
Dept: FAMILY MEDICINE | Facility: CLINIC | Age: 67
End: 2022-09-26
Payer: MEDICARE

## 2022-09-26 NOTE — TELEPHONE ENCOUNTER
Left a voicemail for the Patient to call the office back to be rescheduled for an EAWV appointment.

## 2022-10-05 ENCOUNTER — PATIENT OUTREACH (OUTPATIENT)
Dept: ADMINISTRATIVE | Facility: HOSPITAL | Age: 67
End: 2022-10-05
Payer: MEDICARE

## 2022-10-05 DIAGNOSIS — Z79.4 TYPE 2 DIABETES MELLITUS WITH DIABETIC NEUROPATHY, WITH LONG-TERM CURRENT USE OF INSULIN: Primary | ICD-10-CM

## 2022-10-05 DIAGNOSIS — E11.40 TYPE 2 DIABETES MELLITUS WITH DIABETIC NEUROPATHY, WITH LONG-TERM CURRENT USE OF INSULIN: Primary | ICD-10-CM

## 2022-10-06 ENCOUNTER — HOSPITAL ENCOUNTER (OUTPATIENT)
Dept: RADIOLOGY | Facility: HOSPITAL | Age: 67
Discharge: HOME | End: 2022-10-06
Attending: UROLOGY
Payer: MEDICARE

## 2022-10-06 DIAGNOSIS — N13.8 BPH WITH OBSTRUCTION/LOWER URINARY TRACT SYMPTOMS: ICD-10-CM

## 2022-10-06 DIAGNOSIS — N40.1 BPH WITH OBSTRUCTION/LOWER URINARY TRACT SYMPTOMS: ICD-10-CM

## 2022-10-06 PROCEDURE — 76770 US EXAM ABDO BACK WALL COMP: CPT | Mod: 26,,, | Performed by: RADIOLOGY

## 2022-10-06 PROCEDURE — 76770 US EXAM ABDO BACK WALL COMP: CPT | Mod: TC

## 2022-10-06 PROCEDURE — 76770 US RETROPERITONEAL COMPLETE: ICD-10-PCS | Mod: 26,,, | Performed by: RADIOLOGY

## 2022-10-20 ENCOUNTER — PES CALL (OUTPATIENT)
Dept: ADMINISTRATIVE | Facility: CLINIC | Age: 67
End: 2022-10-20
Payer: MEDICARE

## 2022-11-03 DIAGNOSIS — Z86.73 HISTORY OF STROKE: ICD-10-CM

## 2022-11-03 NOTE — TELEPHONE ENCOUNTER
Care Due:                  Date            Visit Type   Department     Provider  --------------------------------------------------------------------------------                                [facility]                              PRIMARY      MED/ INTERNAL  Last Visit: 08-      CARE ([facility])   MED/ PEDS      Raciel Raymond                              [facility]                              PRIMARY      MED/ INTERNAL  Next Visit: 02-      CARE ([facility])   MED/ PEDS      Raciel Raymond                                                            Last  Test          Frequency    Reason                     Performed    Due Date  --------------------------------------------------------------------------------    CBC.........  12 months..  clopidogreL..............  02- 02-    CMP.........  12 months..  atorvastatin.............  01- 01-    Lipid Panel.  12 months..  atorvastatin.............  11-   10-    [facility] Embedded Care Gaps. Reference number: [id number]. 11/03/2022   8:45:53 AM CDT

## 2022-11-03 NOTE — TELEPHONE ENCOUNTER
Refill Routing Note   Medication(s) are not appropriate for processing by [facility] for the following reason(s):      - Required laboratory values are outdated    [facility] action(s):  Defer          Medication reconciliation completed: No     Appointments  past 12m or future 3m with PCP    Date Provider   Last Visit   8/18/2022 Raciel Raymond MD   Next Visit   2/20/2023 Raciel Raymond MD   ED visits in past 90 days: 0        Note composed:1:00 PM 11/03/2022

## 2022-11-04 RX ORDER — CLOPIDOGREL BISULFATE 75 MG/1
75 TABLET ORAL DAILY
Qty: 90 TABLET | Refills: 3 | Status: SHIPPED | OUTPATIENT
Start: 2022-11-04 | End: 2023-10-26 | Stop reason: ALTCHOICE

## 2022-11-15 ENCOUNTER — OFFICE VISIT (OUTPATIENT)
Dept: PODIATRY | Facility: CLINIC | Age: 67
End: 2022-11-15
Payer: MEDICARE

## 2022-11-15 DIAGNOSIS — N18.6 CONTROLLED TYPE 2 DIABETES MELLITUS WITH CHRONIC KIDNEY DISEASE ON CHRONIC DIALYSIS, WITH LONG-TERM CURRENT USE OF INSULIN: Primary | ICD-10-CM

## 2022-11-15 DIAGNOSIS — Z79.4 CONTROLLED TYPE 2 DIABETES MELLITUS WITH CHRONIC KIDNEY DISEASE ON CHRONIC DIALYSIS, WITH LONG-TERM CURRENT USE OF INSULIN: Primary | ICD-10-CM

## 2022-11-15 DIAGNOSIS — E11.22 CONTROLLED TYPE 2 DIABETES MELLITUS WITH CHRONIC KIDNEY DISEASE ON CHRONIC DIALYSIS, WITH LONG-TERM CURRENT USE OF INSULIN: Primary | ICD-10-CM

## 2022-11-15 DIAGNOSIS — L84 CORN OR CALLUS: ICD-10-CM

## 2022-11-15 DIAGNOSIS — Z99.2 CONTROLLED TYPE 2 DIABETES MELLITUS WITH CHRONIC KIDNEY DISEASE ON CHRONIC DIALYSIS, WITH LONG-TERM CURRENT USE OF INSULIN: Primary | ICD-10-CM

## 2022-11-15 DIAGNOSIS — B35.1 ONYCHOMYCOSIS DUE TO DERMATOPHYTE: ICD-10-CM

## 2022-11-15 PROCEDURE — 99499 NO LOS: ICD-10-PCS | Mod: S$GLB,,, | Performed by: PODIATRIST

## 2022-11-15 PROCEDURE — 11055 PARING/CUTG B9 HYPRKER LES 1: CPT | Mod: Q9,S$GLB,, | Performed by: PODIATRIST

## 2022-11-15 PROCEDURE — 99499 UNLISTED E&M SERVICE: CPT | Mod: S$GLB,,, | Performed by: PODIATRIST

## 2022-11-15 PROCEDURE — 11721 DEBRIDE NAIL 6 OR MORE: CPT | Mod: 59,Q9,S$GLB, | Performed by: PODIATRIST

## 2022-11-15 PROCEDURE — 11721 PR DEBRIDEMENT OF NAILS, 6 OR MORE: ICD-10-PCS | Mod: 59,Q9,S$GLB, | Performed by: PODIATRIST

## 2022-11-15 PROCEDURE — 11055 PR TRIM HYPERKERATOTIC SKIN LESION, ONE: ICD-10-PCS | Mod: Q9,S$GLB,, | Performed by: PODIATRIST

## 2022-11-18 NOTE — PROGRESS NOTES
Subjective:      Patient ID: Miguel Moore is a 67 y.o. male.    Chief Complaint: No chief complaint on file.    Miguel is a 67 y.o. male who presents to the clinic for evaluation and treatment of high risk feet. Miguel has a past medical history of Allergy, Clotting disorder, Deep vein thrombosis, Diabetes mellitus, type 2, Hypertension, Nuclear sclerosis of both eyes (6/9/2017), Renal disorder, Seizures, Stroke, and Urinary tract infection.   This patient has documented high risk feet requiring routine maintenance secondary to diabetes mellitis and those secondary complications of diabetes, as mentioned.   Presents for diabetic foot risk assessment.         PCP: Raciel Raymond MD    Date Last Seen by PCP:   No chief complaint on file.        Current shoe gear:   Tennis shoes         Hemoglobin A1C   Date Value Ref Range Status   11/02/2021 5.9 (H) 4.0 - 5.6 % Final     Comment:     ADA Screening Guidelines:  5.7-6.4%  Consistent with prediabetes  >or=6.5%  Consistent with diabetes    High levels of fetal hemoglobin interfere with the HbA1C  assay. Heterozygous hemoglobin variants (HbS, HgC, etc)do  not significantly interfere with this assay.   However, presence of multiple variants may affect accuracy.     08/13/2020 5.5 4.0 - 5.6 % Final     Comment:     ADA Screening Guidelines:  5.7-6.4%  Consistent with prediabetes  >or=6.5%  Consistent with diabetes  High levels of fetal hemoglobin interfere with the HbA1C  assay. Heterozygous hemoglobin variants (HbS, HgC, etc)do  not significantly interfere with this assay.   However, presence of multiple variants may affect accuracy.     01/17/2020 6.0 (H) 4.0 - 5.6 % Final     Comment:     ADA Screening Guidelines:  5.7-6.4%  Consistent with prediabetes  >or=6.5%  Consistent with diabetes  High levels of fetal hemoglobin interfere with the HbA1C  assay. Heterozygous hemoglobin variants (HbS, HgC, etc)do  not significantly interfere with this assay.   However,  presence of multiple variants may affect accuracy.       Past Medical History:   Diagnosis Date    Allergy     Clotting disorder     Elaquis and APlavix    Deep vein thrombosis     Diabetes mellitus, type 2     Hypertension     Nuclear sclerosis of both eyes 6/9/2017    Renal disorder     Seizures     Stroke     Urinary tract infection        Past Surgical History:   Procedure Laterality Date    CATARACT EXTRACTION W/  INTRAOCULAR LENS IMPLANT Right 10/05/2017    Dr. Tay    CATARACT EXTRACTION W/  INTRAOCULAR LENS IMPLANT Left 10/19/2017    Dr. Tay    CYSTOSCOPY W/ RETROGRADES Bilateral 2/18/2021    Procedure: CYSTOSCOPY, WITH RETROGRADE PYELOGRAM;  Surgeon: JEMMA Styles MD;  Location: [facility] OR;  Service: Urology;  Laterality: Bilateral;  REQUESTING EARLY AS POSSIBE-LO / 2/8/2021 @ 1:13PM  RN Pre Op 2-11-21, Covid NEGATIVE ON  2-17-21.  C A    ESOPHAGOGASTRODUODENOSCOPY N/A 8/17/2020    Procedure: EGD (ESOPHAGOGASTRODUODENOSCOPY);  Surgeon: Desmond Chapman MD;  Location: [facility] ENDO;  Service: Endoscopy;  Laterality: N/A;    EYE SURGERY Bilateral     cataract    FEMORAL ARTERY STENT      FRACTURE SURGERY      KNEE SURGERY      bilateral scope       Family History   Problem Relation Age of Onset    Diabetes Mother     Heart disease Mother     Hypertension Mother     Cataracts Mother     No Known Problems Father     Hypertension Sister     No Known Problems Brother     No Known Problems Daughter     No Known Problems Maternal Aunt     No Known Problems Maternal Uncle     No Known Problems Paternal Aunt     No Known Problems Paternal Uncle     No Known Problems Maternal Grandmother     No Known Problems Maternal Grandfather     No Known Problems Paternal Grandmother     No Known Problems Paternal Grandfather     Amblyopia Neg Hx     Blindness Neg Hx     Cancer Neg Hx     Glaucoma Neg Hx     Macular degeneration Neg Hx     Retinal detachment Neg Hx     Strabismus Neg Hx     Stroke Neg Hx     Thyroid  disease Neg Hx        Social History     Socioeconomic History    Marital status: Single   Occupational History    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 90 tablet 3    atorvastatin (LIPITOR) 80 MG tablet Take 1 tablet (80 mg total) by mouth once daily. 90 tablet 3    blood sugar diagnostic Strp To check BG 1 times daily, to use with insurance preferred meter 100 strip 3    blood-glucose meter kit To check BG 1 times daily, to use with insurance preferred meter 1 each 0    calcitRIOL (ROCALTROL) 0.25 MCG Cap Take 1 capsule (0.25 mcg total) by mouth once daily.      cholecalciferol, vitamin D3, (VITAMIN D3) 25 mcg (1,000 unit) capsule Take 1 capsule (1,000 Units total) by mouth once daily. 90 capsule 3    clopidogreL (PLAVIX) 75 mg tablet Take 1 tablet (75 mg total) by mouth once daily. 90 tablet 3    clotrimazole-betamethasone 1-0.05% (LOTRISONE) cream Apply topically 2 (two) times daily. 15 g 0    ELIQUIS 5 mg Tab Take 1 tablet by mouth twice daily 180 tablet 3    ergocalciferol, vitamin D2, (VITAMIN D ORAL) Take 1 mcg by mouth.      finasteride (PROSCAR) 5 mg tablet Take 1 tablet (5 mg total) by mouth once daily. 90 tablet 3    heparin sodium,porcine (HEPARIN, PORCINE,) 1,000 unit/mL Soln injection by Intra-Catheter route.      hydrocortisone (WESTCORT) 0.2 % cream Apply topically 2 (two) times daily. 15 g 0    labetaloL (NORMODYNE) 100 MG tablet Take 1 tablet (100 mg total) by mouth once daily at 6am. 90 tablet 3    lancets Misc To check BG 1 times daily, to use with insurance preferred meter 100 each 4    levETIRAcetam (KEPPRA) 250 MG Tab Take 1 tablet (250  mg total) by mouth 2 (two) times daily on Tuesday, Thursday, Saturday & Sunday. Take 2 tablets (500mg total) by mouth in the morning and 1 tablet (250mg total) in the evening on dialysis days (Monday, Wednesday, & Friday) 80 tablet 11    loperamide (IMODIUM) 1 mg/5 mL solution Take 4 mg by mouth.      methoxy peg-epoetin beta (MIRCERA INJ) 30 mcg.      RENVELA 800 mg Tab Take 1 tablet (800 mg total) by mouth 3 (three) times daily with meals. 90 tablet 0    sodium chloride 0.9% SolP 1,000 mL with heparin (porcine) 10,000 unit/mL Soln 90,000 Units by Intra-Catheter route.      sodium chloride 0.9% SolP 100 mL with iron sucrose 100 mg iron/5 mL Soln 100 mg 50 mg.      tamsulosin (FLOMAX) 0.4 mg Cap Take 2 capsules (0.8 mg total) by mouth once daily. 180 capsule 3     No current facility-administered medications for this visit.       Review of patient's allergies indicates:   Allergen Reactions    Penicillins Hives       Review of Systems   Constitutional: Negative for chills and fever.   Cardiovascular:  Positive for leg swelling. Negative for chest pain and claudication.   Respiratory:  Negative for cough and shortness of breath.    Skin:  Positive for dry skin and suspicious lesions (corns/callus).   Musculoskeletal:  Positive for muscle weakness.   Gastrointestinal:  Negative for nausea and vomiting.   Neurological:  Positive for focal weakness (right sided), numbness and sensory change. Negative for paresthesias.   Psychiatric/Behavioral:  Negative for altered mental status.          Objective:      Physical Exam  Vitals reviewed.   Constitutional:       Appearance: He is well-developed.   HENT:      Head: Normocephalic.   Cardiovascular:      Pulses:           Dorsalis pedis pulses are 2+ on the right side and 2+ on the left side.        Posterior tibial pulses are 1+ on the right side and 1+ on the left side.      Comments: CRT < 3 sec to tips of toes. 2+ pitting edema noted to b/l LE. No vericosities noted to  b/l LEs.     Pulmonary:      Effort: No respiratory distress.   Musculoskeletal:      Comments: Gastrocnemius equinus noted to b/l ankles with decreased DF noted on exam. MMT 5/5 in DF/PF/Inv/Ev resistance with no reproduction of pain in any direction Right, 3/5 left. Passive range of motion of ankle and pedal joints is painless. Adequate pedal joint ROM.     Rigid hammertoe contractures noted to toes 2-4 R-asymptomatic.     Limited hallux MTPJ ROM with plantarflexion contracture of b/l hallux IPJ, rigid R semi-reducible L.      A mass of size 2 cm is felt in the right dorsal foot.     Skin:     General: Skin is warm and dry.      Findings: Lesion present. No ecchymosis or erythema.      Comments: No open lesions, lacerations or wounds noted. Nails are dystrophic, elongated, thickened with yellow/brown discoloration to R 1 and L 1-5. Remaining toenails normotrophic.  Interdigital spaces clean, dry and intact b/l. No erythema noted to b/l foot. Skin texture thin, shiny, atrophic. Pedal hair absent. Toes cool to touch b/l.     Pre ulcerative callus left heel.    Neurological:      Mental Status: He is alert and oriented to person, place, and time.      Sensory: Sensory deficit present.      Comments: Light touch, proprioception, and sharp/dull sensation are all intact bilaterally. Protective threshold with the Brunswick-Wienstein monofilament is intact bilaterally. Vibratory sensation diminished b/l distal foot.      Psychiatric:         Behavior: Behavior normal.         Thought Content: Thought content normal.         Judgment: Judgment normal.       Assessment:       Encounter Diagnoses   Name Primary?    Controlled type 2 diabetes mellitus with chronic kidney disease on chronic dialysis, with long-term current use of insulin Yes    Onychomycosis due to dermatophyte            Plan:       Diagnoses and all orders for this visit:    Controlled type 2 diabetes mellitus with chronic kidney disease on chronic dialysis,  with long-term current use of insulin    Onychomycosis due to dermatophyte      I counseled the patient on his conditions, their implications and medical management.    - Shoe inspection. Diabetic Foot Education. Patient reminded of the importance of good nutrition and blood sugar control to help prevent podiatric complications of diabetes. Patient instructed on proper foot hygeine. We discussed wearing proper shoe gear, daily foot inspections, never walking without protective shoe gear, never putting sharp instruments to feet, routine podiatric nail visits every 2-3 months.   - With patient's permission, nails were aggressively reduced and debrided x 10 to their soft tissue attachment mechanically and with electric , removing all offending nail and debris. Patient relates relief following the procedure. He will continue to monitor the areas daily, inspect his feet, wear protective shoe gear when ambulatory, moisturizer to maintain skin integrity and follow in this office in approximately 2-3 months, sooner p.r.n.   - After cleansing the area w/ alcohol prep pad the above mentioned hyperkeratosis was trimmed utilizing No 15 scapel, to a smooth base with out incident.     Sabi Douglas DPM

## 2022-11-21 ENCOUNTER — TELEPHONE (OUTPATIENT)
Dept: FAMILY MEDICINE | Facility: CLINIC | Age: 67
End: 2022-11-21
Payer: MEDICARE

## 2022-11-23 ENCOUNTER — PES CALL (OUTPATIENT)
Dept: ADMINISTRATIVE | Facility: OTHER | Age: 67
End: 2022-11-23
Payer: MEDICARE

## 2022-12-05 ENCOUNTER — PES CALL (OUTPATIENT)
Dept: ADMINISTRATIVE | Facility: CLINIC | Age: 67
End: 2022-12-05
Payer: MEDICARE

## 2022-12-19 ENCOUNTER — TELEPHONE (OUTPATIENT)
Dept: UROLOGY | Facility: HOSPITAL | Age: 67
End: 2022-12-19
Payer: MEDICARE

## 2022-12-19 NOTE — TELEPHONE ENCOUNTER
----- Message from Leanna Reyes MA sent at 12/19/2022 10:38 AM CST -----  The pt is wanting to know if a new ultrasound can be ordered as he had the US done early in October.

## 2022-12-23 ENCOUNTER — TELEPHONE (OUTPATIENT)
Dept: UROLOGY | Facility: CLINIC | Age: 67
End: 2022-12-23
Payer: MEDICARE

## 2022-12-23 NOTE — TELEPHONE ENCOUNTER
JOSEM to confirm appointment on 12/27 at 10:15 with Dr. Styles   Patient requests all Lab, Cardiology, and Radiology Results on their Discharge Instructions

## 2023-01-13 ENCOUNTER — PES CALL (OUTPATIENT)
Dept: ADMINISTRATIVE | Facility: CLINIC | Age: 68
End: 2023-01-13
Payer: MEDICARE

## 2023-01-31 ENCOUNTER — PES CALL (OUTPATIENT)
Dept: ADMINISTRATIVE | Facility: CLINIC | Age: 68
End: 2023-01-31
Payer: MEDICARE

## 2023-02-14 ENCOUNTER — OFFICE VISIT (OUTPATIENT)
Dept: PODIATRY | Facility: CLINIC | Age: 68
End: 2023-02-14
Payer: MEDICARE

## 2023-02-14 VITALS — WEIGHT: 171.06 LBS | BODY MASS INDEX: 25.92 KG/M2 | HEIGHT: 68 IN

## 2023-02-14 DIAGNOSIS — B35.1 ONYCHOMYCOSIS DUE TO DERMATOPHYTE: ICD-10-CM

## 2023-02-14 DIAGNOSIS — N18.6 CONTROLLED TYPE 2 DIABETES MELLITUS WITH CHRONIC KIDNEY DISEASE ON CHRONIC DIALYSIS, WITH LONG-TERM CURRENT USE OF INSULIN: Primary | ICD-10-CM

## 2023-02-14 DIAGNOSIS — E11.22 CONTROLLED TYPE 2 DIABETES MELLITUS WITH CHRONIC KIDNEY DISEASE ON CHRONIC DIALYSIS, WITH LONG-TERM CURRENT USE OF INSULIN: Primary | ICD-10-CM

## 2023-02-14 DIAGNOSIS — Z79.4 CONTROLLED TYPE 2 DIABETES MELLITUS WITH CHRONIC KIDNEY DISEASE ON CHRONIC DIALYSIS, WITH LONG-TERM CURRENT USE OF INSULIN: Primary | ICD-10-CM

## 2023-02-14 DIAGNOSIS — Z99.2 CONTROLLED TYPE 2 DIABETES MELLITUS WITH CHRONIC KIDNEY DISEASE ON CHRONIC DIALYSIS, WITH LONG-TERM CURRENT USE OF INSULIN: Primary | ICD-10-CM

## 2023-02-14 DIAGNOSIS — L84 CORN OR CALLUS: ICD-10-CM

## 2023-02-14 PROCEDURE — 11721 DEBRIDE NAIL 6 OR MORE: CPT | Mod: 59,Q9,HCNC,S$GLB | Performed by: PODIATRIST

## 2023-02-14 PROCEDURE — 11055 PARING/CUTG B9 HYPRKER LES 1: CPT | Mod: Q9,HCNC,S$GLB, | Performed by: PODIATRIST

## 2023-02-14 PROCEDURE — 1159F MED LIST DOCD IN RCRD: CPT | Mod: HCNC,CPTII,S$GLB, | Performed by: PODIATRIST

## 2023-02-14 PROCEDURE — 1159F PR MEDICATION LIST DOCUMENTED IN MEDICAL RECORD: ICD-10-PCS | Mod: HCNC,CPTII,S$GLB, | Performed by: PODIATRIST

## 2023-02-14 PROCEDURE — 3008F PR BODY MASS INDEX (BMI) DOCUMENTED: ICD-10-PCS | Mod: HCNC,CPTII,S$GLB, | Performed by: PODIATRIST

## 2023-02-14 PROCEDURE — 99999 PR PBB SHADOW E&M-EST. PATIENT-LVL III: CPT | Mod: PBBFAC,HCNC,, | Performed by: PODIATRIST

## 2023-02-14 PROCEDURE — 3288F PR FALLS RISK ASSESSMENT DOCUMENTED: ICD-10-PCS | Mod: HCNC,CPTII,S$GLB, | Performed by: PODIATRIST

## 2023-02-14 PROCEDURE — 3288F FALL RISK ASSESSMENT DOCD: CPT | Mod: HCNC,CPTII,S$GLB, | Performed by: PODIATRIST

## 2023-02-14 PROCEDURE — 1101F PT FALLS ASSESS-DOCD LE1/YR: CPT | Mod: HCNC,CPTII,S$GLB, | Performed by: PODIATRIST

## 2023-02-14 PROCEDURE — 99499 UNLISTED E&M SERVICE: CPT | Mod: HCNC,S$GLB,, | Performed by: PODIATRIST

## 2023-02-14 PROCEDURE — 99999 PR PBB SHADOW E&M-EST. PATIENT-LVL III: ICD-10-PCS | Mod: PBBFAC,HCNC,, | Performed by: PODIATRIST

## 2023-02-14 PROCEDURE — 11055 PR TRIM HYPERKERATOTIC SKIN LESION, ONE: ICD-10-PCS | Mod: Q9,HCNC,S$GLB, | Performed by: PODIATRIST

## 2023-02-14 PROCEDURE — 99499 NO LOS: ICD-10-PCS | Mod: HCNC,S$GLB,, | Performed by: PODIATRIST

## 2023-02-14 PROCEDURE — 11721 PR DEBRIDEMENT OF NAILS, 6 OR MORE: ICD-10-PCS | Mod: 59,Q9,HCNC,S$GLB | Performed by: PODIATRIST

## 2023-02-14 PROCEDURE — 3008F BODY MASS INDEX DOCD: CPT | Mod: HCNC,CPTII,S$GLB, | Performed by: PODIATRIST

## 2023-02-14 PROCEDURE — 99499 UNLISTED E&M SERVICE: CPT | Mod: S$GLB,,, | Performed by: PODIATRIST

## 2023-02-14 PROCEDURE — 1101F PR PT FALLS ASSESS DOC 0-1 FALLS W/OUT INJ PAST YR: ICD-10-PCS | Mod: HCNC,CPTII,S$GLB, | Performed by: PODIATRIST

## 2023-02-14 NOTE — PROGRESS NOTES
Subjective:      Patient ID: Miguel Moore is a 67 y.o. male.    Chief Complaint: Diabetes Mellitus (8/18/22 Dr Raymond PCP) and Nail Care      Miguel is a 67 y.o. male who presents to the clinic for evaluation and treatment of high risk feet. Miguel has a past medical history of Allergy, Clotting disorder, Deep vein thrombosis, Diabetes mellitus, type 2, Hypertension, Nuclear sclerosis of both eyes (6/9/2017), Renal disorder, Seizures, Stroke, and Urinary tract infection.   This patient has documented high risk feet requiring routine maintenance secondary to diabetes mellitis and those secondary complications of diabetes, as mentioned.   Presents for diabetic foot risk assessment.         PCP: Raciel Raymond MD    Date Last Seen by PCP:   Chief Complaint   Patient presents with    Diabetes Mellitus     8/18/22 Dr Raymond PCP    Nail Care           Current shoe gear:   Tennis shoes         Hemoglobin A1C   Date Value Ref Range Status   11/02/2021 5.9 (H) 4.0 - 5.6 % Final     Comment:     ADA Screening Guidelines:  5.7-6.4%  Consistent with prediabetes  >or=6.5%  Consistent with diabetes    High levels of fetal hemoglobin interfere with the HbA1C  assay. Heterozygous hemoglobin variants (HbS, HgC, etc)do  not significantly interfere with this assay.   However, presence of multiple variants may affect accuracy.     08/13/2020 5.5 4.0 - 5.6 % Final     Comment:     ADA Screening Guidelines:  5.7-6.4%  Consistent with prediabetes  >or=6.5%  Consistent with diabetes  High levels of fetal hemoglobin interfere with the HbA1C  assay. Heterozygous hemoglobin variants (HbS, HgC, etc)do  not significantly interfere with this assay.   However, presence of multiple variants may affect accuracy.     01/17/2020 6.0 (H) 4.0 - 5.6 % Final     Comment:     ADA Screening Guidelines:  5.7-6.4%  Consistent with prediabetes  >or=6.5%  Consistent with diabetes  High levels of fetal hemoglobin interfere with the HbA1C  assay.  Heterozygous hemoglobin variants (HbS, HgC, etc)do  not significantly interfere with this assay.   However, presence of multiple variants may affect accuracy.       Past Medical History:   Diagnosis Date    Allergy     Clotting disorder     Elaquis and APlavix    Deep vein thrombosis     Diabetes mellitus, type 2     Hypertension     Nuclear sclerosis of both eyes 6/9/2017    Renal disorder     Seizures     Stroke     Urinary tract infection        Past Surgical History:   Procedure Laterality Date    CATARACT EXTRACTION W/  INTRAOCULAR LENS IMPLANT Right 10/05/2017    Dr. Tay    CATARACT EXTRACTION W/  INTRAOCULAR LENS IMPLANT Left 10/19/2017    Dr. Tay    CYSTOSCOPY W/ RETROGRADES Bilateral 2/18/2021    Procedure: CYSTOSCOPY, WITH RETROGRADE PYELOGRAM;  Surgeon: JEMMA Styles MD;  Location: [facility] OR;  Service: Urology;  Laterality: Bilateral;  REQUESTING EARLY AS POSSIBE-LO / 2/8/2021 @ 1:13PM  RN Pre Op 2-11-21, Covid NEGATIVE ON  2-17-21.  C A    ESOPHAGOGASTRODUODENOSCOPY N/A 8/17/2020    Procedure: EGD (ESOPHAGOGASTRODUODENOSCOPY);  Surgeon: Desmond Chapman MD;  Location: [facility] ENDO;  Service: Endoscopy;  Laterality: N/A;    EYE SURGERY Bilateral     cataract    FEMORAL ARTERY STENT      FRACTURE SURGERY      KNEE SURGERY      bilateral scope       Family History   Problem Relation Age of Onset    Diabetes Mother     Heart disease Mother     Hypertension Mother     Cataracts Mother     No Known Problems Father     Hypertension Sister     No Known Problems Brother     No Known Problems Daughter     No Known Problems Maternal Aunt     No Known Problems Maternal Uncle     No Known Problems Paternal Aunt     No Known Problems Paternal Uncle     No Known Problems Maternal Grandmother     No Known Problems Maternal Grandfather     No Known Problems Paternal Grandmother     No Known Problems Paternal Grandfather     Amblyopia Neg Hx     Blindness Neg Hx     Cancer Neg Hx     Glaucoma Neg Hx      Macular degeneration Neg Hx     Retinal detachment Neg Hx     Strabismus Neg Hx     Stroke Neg Hx     Thyroid disease Neg Hx        Social History     Socioeconomic History    Marital status: Single   Occupational History    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 90 tablet 3    atorvastatin (LIPITOR) 80 MG tablet Take 1 tablet (80 mg total) by mouth once daily. 90 tablet 3    blood sugar diagnostic Strp To check BG 1 times daily, to use with insurance preferred meter 100 strip 3    blood-glucose meter kit To check BG 1 times daily, to use with insurance preferred meter 1 each 0    calcitRIOL (ROCALTROL) 0.25 MCG Cap Take 1 capsule (0.25 mcg total) by mouth once daily.      cholecalciferol, vitamin D3, (VITAMIN D3) 25 mcg (1,000 unit) capsule Take 1 capsule (1,000 Units total) by mouth once daily. 90 capsule 3    clopidogreL (PLAVIX) 75 mg tablet Take 1 tablet (75 mg total) by mouth once daily. 90 tablet 3    clotrimazole-betamethasone 1-0.05% (LOTRISONE) cream Apply topically 2 (two) times daily. 15 g 0    ELIQUIS 5 mg Tab Take 1 tablet by mouth twice daily 180 tablet 3    finasteride (PROSCAR) 5 mg tablet Take 1 tablet (5 mg total) by mouth once daily. 90 tablet 3    hydrocortisone (WESTCORT) 0.2 % cream Apply topically 2 (two) times daily. 15 g 0    labetaloL (NORMODYNE) 100 MG tablet Take 1 tablet (100 mg total) by mouth once daily at 6am. 90 tablet 3    lancets Misc To check BG 1 times daily, to use with insurance preferred meter 100 each 4    levETIRAcetam (KEPPRA) 250 MG Tab Take 1 tablet (250 mg total) by mouth 2 (two) times daily on Tuesday, Thursday,  Saturday & Sunday. Take 2 tablets (500mg total) by mouth in the morning and 1 tablet (250mg total) in the evening on dialysis days (Monday, Wednesday, & Friday) 80 tablet 11    methoxy peg-epoetin beta (MIRCERA INJ) 30 mcg.      RENVELA 800 mg Tab Take 1 tablet (800 mg total) by mouth 3 (three) times daily with meals. 90 tablet 0    sodium chloride 0.9% SolP 100 mL with iron sucrose 100 mg iron/5 mL Soln 100 mg 50 mg.      tamsulosin (FLOMAX) 0.4 mg Cap Take 2 capsules (0.8 mg total) by mouth once daily. 180 capsule 3     No current facility-administered medications for this visit.       Review of patient's allergies indicates:   Allergen Reactions    Penicillins Hives       Review of Systems   Constitutional: Negative for chills and fever.   Cardiovascular:  Positive for leg swelling. Negative for chest pain and claudication.   Respiratory:  Negative for cough and shortness of breath.    Skin:  Positive for dry skin and suspicious lesions (corns/callus).   Musculoskeletal:  Positive for muscle weakness.   Gastrointestinal:  Negative for nausea and vomiting.   Neurological:  Positive for focal weakness (right sided), numbness and sensory change. Negative for paresthesias.   Psychiatric/Behavioral:  Negative for altered mental status.          Objective:      Physical Exam  Vitals reviewed.   Constitutional:       Appearance: He is well-developed.   HENT:      Head: Normocephalic.   Cardiovascular:      Pulses:           Dorsalis pedis pulses are 2+ on the right side and 2+ on the left side.        Posterior tibial pulses are 1+ on the right side and 1+ on the left side.      Comments: CRT < 3 sec to tips of toes. 2+ pitting edema noted to b/l LE. No vericosities noted to b/l LEs.     Pulmonary:      Effort: No respiratory distress.   Musculoskeletal:      Comments: Gastrocnemius equinus noted to b/l ankles with decreased DF noted on exam. MMT 5/5 in DF/PF/Inv/Ev resistance with no reproduction of pain in any  direction Right, 3/5 left. Passive range of motion of ankle and pedal joints is painless. Adequate pedal joint ROM.     Rigid hammertoe contractures noted to toes 2-4 R-asymptomatic.     Limited hallux MTPJ ROM with plantarflexion contracture of b/l hallux IPJ, rigid R semi-reducible L.      A mass of size 2 cm is felt in the right dorsal foot.     Skin:     General: Skin is warm and dry.      Findings: Lesion present. No ecchymosis or erythema.      Comments: No open lesions, lacerations or wounds noted. Nails are dystrophic, elongated, thickened with yellow/brown discoloration to R 1 and L 1-5. Remaining toenails normotrophic.  Interdigital spaces clean, dry and intact b/l. No erythema noted to b/l foot. Skin texture thin, shiny, atrophic. Pedal hair absent. Toes cool to touch b/l.     Pre ulcerative callus right heel.    Neurological:      Mental Status: He is alert and oriented to person, place, and time.      Sensory: Sensory deficit present.      Comments: Light touch, proprioception, and sharp/dull sensation are all intact bilaterally. Protective threshold with the [place]-Wienstein monofilament is intact bilaterally. Vibratory sensation diminished b/l distal foot.      Psychiatric:         Behavior: Behavior normal.         Thought Content: Thought content normal.         Judgment: Judgment normal.       Assessment:       Encounter Diagnoses   Name Primary?    Controlled type 2 diabetes mellitus with chronic kidney disease on chronic dialysis, with long-term current use of insulin Yes    Onychomycosis due to dermatophyte     Corn or callus              Plan:       Miguel was seen today for diabetes mellitus and nail care.    Diagnoses and all orders for this visit:    Controlled type 2 diabetes mellitus with chronic kidney disease on chronic dialysis, with long-term current use of insulin    Onychomycosis due to dermatophyte    Corn or callus          I counseled the patient on his conditions, their  implications and medical management.    - Shoe inspection. Diabetic Foot Education. Patient reminded of the importance of good nutrition and blood sugar control to help prevent podiatric complications of diabetes. Patient instructed on proper foot hygeine. We discussed wearing proper shoe gear, daily foot inspections, never walking without protective shoe gear, never putting sharp instruments to feet, routine podiatric nail visits every 2-3 months.   - With patient's permission, nails were aggressively reduced and debrided x 10 to their soft tissue attachment mechanically and with electric , removing all offending nail and debris. Patient relates relief following the procedure. He will continue to monitor the areas daily, inspect his feet, wear protective shoe gear when ambulatory, moisturizer to maintain skin integrity and follow in this office in approximately 2-3 months, sooner p.r.n.   - After cleansing the area w/ alcohol prep pad the above mentioned hyperkeratosis was trimmed utilizing No 15 scapel, to a smooth base with out incident.     Sabi Douglas DPM

## 2023-02-19 PROBLEM — I82.531 CHRONIC DEEP VEIN THROMBOSIS (DVT) OF POPLITEAL VEIN OF RIGHT LOWER EXTREMITY: Status: ACTIVE | Noted: 2020-09-21

## 2023-02-19 PROBLEM — Z86.19 HISTORY OF FUNGAL INFECTION: Status: ACTIVE | Noted: 2020-08-29

## 2023-03-06 ENCOUNTER — TELEPHONE (OUTPATIENT)
Dept: ADMINISTRATIVE | Facility: CLINIC | Age: 68
End: 2023-03-06
Payer: MEDICARE

## 2023-03-06 NOTE — TELEPHONE ENCOUNTER
Called pt; no answer; could not leave a message due to line kept ringing; I was calling to confirm pt's in office [facility] appt on 3/7/23

## 2023-03-24 ENCOUNTER — PATIENT OUTREACH (OUTPATIENT)
Dept: ADMINISTRATIVE | Facility: HOSPITAL | Age: 68
End: 2023-03-24
Payer: MEDICARE

## 2023-03-24 DIAGNOSIS — E11.40 TYPE 2 DIABETES MELLITUS WITH DIABETIC NEUROPATHY, WITH LONG-TERM CURRENT USE OF INSULIN: Primary | ICD-10-CM

## 2023-03-24 DIAGNOSIS — Z79.4 TYPE 2 DIABETES MELLITUS WITH DIABETIC NEUROPATHY, WITH LONG-TERM CURRENT USE OF INSULIN: Primary | ICD-10-CM

## 2023-03-29 ENCOUNTER — PES CALL (OUTPATIENT)
Dept: ADMINISTRATIVE | Facility: CLINIC | Age: 68
End: 2023-03-29
Payer: MEDICARE

## 2023-04-25 ENCOUNTER — TELEPHONE (OUTPATIENT)
Dept: FAMILY MEDICINE | Facility: CLINIC | Age: 68
End: 2023-04-25
Payer: MEDICARE

## 2023-04-25 DIAGNOSIS — N18.6 ESRD (END STAGE RENAL DISEASE): Primary | ICD-10-CM

## 2023-04-25 NOTE — TELEPHONE ENCOUNTER
----- Message from Rosy Bean sent at 4/25/2023  7:54 AM CDT -----  .Type: Patient Call Back    Who called: Self     What is the request in detail: Patient stated [place] [facility] [place] would like to know can he get a referral to vascular surgery     Can the clinic reply by [facility]? No     Would the patient rather a call back or a response via [facility]? Call Back     Best call back number: .[phone] (home)       Additional Information:

## 2023-04-27 ENCOUNTER — LAB VISIT (OUTPATIENT)
Dept: LAB | Facility: HOSPITAL | Age: 68
End: 2023-04-27
Attending: INTERNAL MEDICINE
Payer: MEDICARE

## 2023-04-27 DIAGNOSIS — Z79.4 TYPE 2 DIABETES MELLITUS WITH DIABETIC NEUROPATHY, WITH LONG-TERM CURRENT USE OF INSULIN: ICD-10-CM

## 2023-04-27 DIAGNOSIS — E11.40 TYPE 2 DIABETES MELLITUS WITH DIABETIC NEUROPATHY, WITH LONG-TERM CURRENT USE OF INSULIN: ICD-10-CM

## 2023-04-27 LAB
CHOLEST SERPL-MCNC: 190 MG/DL (ref 120–199)
CHOLEST/HDLC SERPL: 5.3 {RATIO} (ref 2–5)
ESTIMATED AVG GLUCOSE: 123 MG/DL (ref 68–131)
HBA1C MFR BLD: 5.9 % (ref 4–5.6)
HDLC SERPL-MCNC: 36 MG/DL (ref 40–75)
HDLC SERPL: 18.9 % (ref 20–50)
LDLC SERPL CALC-MCNC: 132.8 MG/DL (ref 63–159)
NONHDLC SERPL-MCNC: 154 MG/DL
TRIGL SERPL-MCNC: 106 MG/DL (ref 30–150)

## 2023-04-27 PROCEDURE — 83036 HEMOGLOBIN GLYCOSYLATED A1C: CPT | Mod: HCNC | Performed by: INTERNAL MEDICINE

## 2023-04-27 PROCEDURE — 80061 LIPID PANEL: CPT | Mod: HCNC | Performed by: INTERNAL MEDICINE

## 2023-04-27 PROCEDURE — 36415 COLL VENOUS BLD VENIPUNCTURE: CPT | Mod: HCNC,PO | Performed by: INTERNAL MEDICINE

## 2023-05-02 ENCOUNTER — OFFICE VISIT (OUTPATIENT)
Dept: OPTOMETRY | Facility: CLINIC | Age: 68
End: 2023-05-02
Payer: MEDICARE

## 2023-05-02 ENCOUNTER — OFFICE VISIT (OUTPATIENT)
Dept: UROLOGY | Facility: CLINIC | Age: 68
End: 2023-05-02
Payer: MEDICARE

## 2023-05-02 DIAGNOSIS — E11.9 TYPE 2 DIABETES MELLITUS WITHOUT RETINOPATHY: Primary | ICD-10-CM

## 2023-05-02 DIAGNOSIS — R35.1 NOCTURIA MORE THAN TWICE PER NIGHT: ICD-10-CM

## 2023-05-02 DIAGNOSIS — R39.89 SUSPECTED UTI: Primary | ICD-10-CM

## 2023-05-02 DIAGNOSIS — Z96.1 PSEUDOPHAKIA: ICD-10-CM

## 2023-05-02 DIAGNOSIS — H26.493 BILATERAL POSTERIOR CAPSULAR OPACIFICATION: ICD-10-CM

## 2023-05-02 DIAGNOSIS — H52.7 REFRACTIVE ERROR: ICD-10-CM

## 2023-05-02 DIAGNOSIS — N13.8 BPH WITH OBSTRUCTION/LOWER URINARY TRACT SYMPTOMS: ICD-10-CM

## 2023-05-02 DIAGNOSIS — N40.1 BPH WITH OBSTRUCTION/LOWER URINARY TRACT SYMPTOMS: ICD-10-CM

## 2023-05-02 PROCEDURE — 99999 PR PBB SHADOW E&M-EST. PATIENT-LVL III: ICD-10-PCS | Mod: PBBFAC,HCNC,, | Performed by: OPTOMETRIST

## 2023-05-02 PROCEDURE — 3044F PR MOST RECENT HEMOGLOBIN A1C LEVEL <7.0%: ICD-10-PCS | Mod: HCNC,CPTII,S$GLB, | Performed by: UROLOGY

## 2023-05-02 PROCEDURE — 1159F MED LIST DOCD IN RCRD: CPT | Mod: HCNC,CPTII,S$GLB, | Performed by: UROLOGY

## 2023-05-02 PROCEDURE — 1160F RVW MEDS BY RX/DR IN RCRD: CPT | Mod: HCNC,CPTII,S$GLB, | Performed by: UROLOGY

## 2023-05-02 PROCEDURE — 87077 CULTURE AEROBIC IDENTIFY: CPT | Mod: HCNC | Performed by: UROLOGY

## 2023-05-02 PROCEDURE — 1159F PR MEDICATION LIST DOCUMENTED IN MEDICAL RECORD: ICD-10-PCS | Mod: HCNC,CPTII,S$GLB, | Performed by: UROLOGY

## 2023-05-02 PROCEDURE — 92015 PR REFRACTION: ICD-10-PCS | Mod: HCNC,S$GLB,, | Performed by: OPTOMETRIST

## 2023-05-02 PROCEDURE — 1101F PT FALLS ASSESS-DOCD LE1/YR: CPT | Mod: HCNC,CPTII,S$GLB, | Performed by: OPTOMETRIST

## 2023-05-02 PROCEDURE — 99999 PR PBB SHADOW E&M-EST. PATIENT-LVL III: CPT | Mod: PBBFAC,HCNC,, | Performed by: OPTOMETRIST

## 2023-05-02 PROCEDURE — 1101F PT FALLS ASSESS-DOCD LE1/YR: CPT | Mod: HCNC,CPTII,S$GLB, | Performed by: UROLOGY

## 2023-05-02 PROCEDURE — 92014 COMPRE OPH EXAM EST PT 1/>: CPT | Mod: HCNC,S$GLB,, | Performed by: OPTOMETRIST

## 2023-05-02 PROCEDURE — 3288F PR FALLS RISK ASSESSMENT DOCUMENTED: ICD-10-PCS | Mod: HCNC,CPTII,S$GLB, | Performed by: UROLOGY

## 2023-05-02 PROCEDURE — 3044F PR MOST RECENT HEMOGLOBIN A1C LEVEL <7.0%: ICD-10-PCS | Mod: HCNC,CPTII,S$GLB, | Performed by: OPTOMETRIST

## 2023-05-02 PROCEDURE — 1101F PR PT FALLS ASSESS DOC 0-1 FALLS W/OUT INJ PAST YR: ICD-10-PCS | Mod: HCNC,CPTII,S$GLB, | Performed by: UROLOGY

## 2023-05-02 PROCEDURE — 99999 PR PBB SHADOW E&M-EST. PATIENT-LVL II: CPT | Mod: PBBFAC,HCNC,, | Performed by: UROLOGY

## 2023-05-02 PROCEDURE — 1159F MED LIST DOCD IN RCRD: CPT | Mod: HCNC,CPTII,S$GLB, | Performed by: OPTOMETRIST

## 2023-05-02 PROCEDURE — 99214 OFFICE O/P EST MOD 30 MIN: CPT | Mod: HCNC,S$GLB,, | Performed by: UROLOGY

## 2023-05-02 PROCEDURE — 87186 SC STD MICRODIL/AGAR DIL: CPT | Mod: HCNC | Performed by: UROLOGY

## 2023-05-02 PROCEDURE — 3044F HG A1C LEVEL LT 7.0%: CPT | Mod: HCNC,CPTII,S$GLB, | Performed by: OPTOMETRIST

## 2023-05-02 PROCEDURE — 2023F PR DILATED RETINAL EXAM W/O EVID OF RETINOPATHY: ICD-10-PCS | Mod: HCNC,CPTII,S$GLB, | Performed by: OPTOMETRIST

## 2023-05-02 PROCEDURE — 3288F FALL RISK ASSESSMENT DOCD: CPT | Mod: HCNC,CPTII,S$GLB, | Performed by: UROLOGY

## 2023-05-02 PROCEDURE — 1160F PR REVIEW ALL MEDS BY PRESCRIBER/CLIN PHARMACIST DOCUMENTED: ICD-10-PCS | Mod: HCNC,CPTII,S$GLB, | Performed by: UROLOGY

## 2023-05-02 PROCEDURE — 99999 PR PBB SHADOW E&M-EST. PATIENT-LVL II: ICD-10-PCS | Mod: PBBFAC,HCNC,, | Performed by: UROLOGY

## 2023-05-02 PROCEDURE — 87088 URINE BACTERIA CULTURE: CPT | Mod: HCNC | Performed by: UROLOGY

## 2023-05-02 PROCEDURE — 1160F RVW MEDS BY RX/DR IN RCRD: CPT | Mod: HCNC,CPTII,S$GLB, | Performed by: OPTOMETRIST

## 2023-05-02 PROCEDURE — 2023F DILAT RTA XM W/O RTNOPTHY: CPT | Mod: HCNC,CPTII,S$GLB, | Performed by: OPTOMETRIST

## 2023-05-02 PROCEDURE — 3288F FALL RISK ASSESSMENT DOCD: CPT | Mod: HCNC,CPTII,S$GLB, | Performed by: OPTOMETRIST

## 2023-05-02 PROCEDURE — 1126F AMNT PAIN NOTED NONE PRSNT: CPT | Mod: HCNC,CPTII,S$GLB, | Performed by: OPTOMETRIST

## 2023-05-02 PROCEDURE — 1160F PR REVIEW ALL MEDS BY PRESCRIBER/CLIN PHARMACIST DOCUMENTED: ICD-10-PCS | Mod: HCNC,CPTII,S$GLB, | Performed by: OPTOMETRIST

## 2023-05-02 PROCEDURE — 92015 DETERMINE REFRACTIVE STATE: CPT | Mod: HCNC,S$GLB,, | Performed by: OPTOMETRIST

## 2023-05-02 PROCEDURE — 1126F PR PAIN SEVERITY QUANTIFIED, NO PAIN PRESENT: ICD-10-PCS | Mod: HCNC,CPTII,S$GLB, | Performed by: OPTOMETRIST

## 2023-05-02 PROCEDURE — 92014 PR EYE EXAM, EST PATIENT,COMPREHESV: ICD-10-PCS | Mod: HCNC,S$GLB,, | Performed by: OPTOMETRIST

## 2023-05-02 PROCEDURE — 99214 PR OFFICE/OUTPT VISIT, EST, LEVL IV, 30-39 MIN: ICD-10-PCS | Mod: HCNC,S$GLB,, | Performed by: UROLOGY

## 2023-05-02 PROCEDURE — 3044F HG A1C LEVEL LT 7.0%: CPT | Mod: HCNC,CPTII,S$GLB, | Performed by: UROLOGY

## 2023-05-02 PROCEDURE — 3288F PR FALLS RISK ASSESSMENT DOCUMENTED: ICD-10-PCS | Mod: HCNC,CPTII,S$GLB, | Performed by: OPTOMETRIST

## 2023-05-02 PROCEDURE — 1101F PR PT FALLS ASSESS DOC 0-1 FALLS W/OUT INJ PAST YR: ICD-10-PCS | Mod: HCNC,CPTII,S$GLB, | Performed by: OPTOMETRIST

## 2023-05-02 PROCEDURE — 1126F AMNT PAIN NOTED NONE PRSNT: CPT | Mod: HCNC,CPTII,S$GLB, | Performed by: UROLOGY

## 2023-05-02 PROCEDURE — 1159F PR MEDICATION LIST DOCUMENTED IN MEDICAL RECORD: ICD-10-PCS | Mod: HCNC,CPTII,S$GLB, | Performed by: OPTOMETRIST

## 2023-05-02 PROCEDURE — 87086 URINE CULTURE/COLONY COUNT: CPT | Mod: HCNC | Performed by: UROLOGY

## 2023-05-02 PROCEDURE — 1126F PR PAIN SEVERITY QUANTIFIED, NO PAIN PRESENT: ICD-10-PCS | Mod: HCNC,CPTII,S$GLB, | Performed by: UROLOGY

## 2023-05-02 NOTE — PROGRESS NOTES
Subjective:       Patient ID: Miguel Moore is a 67 y.o. male The patient's last visit with me was on 9/15/2022.     Chief Complaint:   Chief Complaint   Patient presents with    Follow-up     Hematuria  Patient complains of gross hematuria. Onset of hematuria was a few months ago and was gradual in onset. There is not a history of nephrolithiasis. There is not a history of urologic trauma. Other urologic symptoms include slow stream, hesitancy, intermittency, nocturia. Patient admits to history of no risk factors for cancer. Patient denies history of Agent Orange exposure, chronic Nunez catheter,  surgeries, occupational exposure, sexually transmitted diseases, tobacco use, trauma and urolithiasis. Prior workup has been none.  He had a CT in January when he was hospitalized with renal failure.    He had a Cystoscopy with simple UDS in 2017 by Dr. Chambers.  This showed an enlarged prostate and incomplete bladder emptying.    He is now on MWF dialysis.    9/15/2022  He had a UTI in August 2022.  He had a negative prostate biopsy in February 2021 for a PSA 10.2.  He thinks he has a UTI due to a strong smell and dark color.     05/02/2023  He has noted some straining for the past week.  The odor is off.    ACTIVE MEDICAL ISSUES:  Patient Active Problem List   Diagnosis    Benign essential HTN    Pure hypercholesterolemia    Controlled type 2 diabetes mellitus with chronic kidney disease on chronic dialysis, with long-term current use of insulin    BPH (benign prostatic hyperplasia) 2/18/21 prostate bx normal    Seizure disorder as sequela of cerebrovascular accident    Hemiplegia and hemiparesis following cerebral infarction affecting right dominant side    Microcytosis 9/2019 labs c/w AoCD and AoCKD    ESRD (end stage renal disease)    Nuclear sclerosis, left    Cortical cataract of both eyes    DM type 2 without retinopathy    Secondary hyperparathyroidism of renal origin    Vitamin D deficiency    Right sided  weakness    Senile nuclear sclerosis    CME (cystoid macular edema), bilateral    Refractive error    History of stroke    Type 2 diabetes mellitus with diabetic neuropathy, with long-term current use of insulin    Nephrotic syndrome    Anasarca    Hypocalcemia    History of fungal infection candida parasilosis hospitalization 8/2020    Chronic deep vein thrombosis (DVT) of popliteal vein of right lower extremity 9/21/20 outside [place]; unprovoked; anticoagulation    Pancytopenia    Elevated liver enzymes    Pulmonary nodule 1/2021 4 mm nodule.    Hematuria    Elevated PSA 2/18/21 prostate bx normal    Anemia in chronic kidney disease    History of diabetic ulcer of foot    Pseudophakia    Bilateral posterior capsular opacification       ALLERGIES AND MEDICATIONS: updated and reviewed.  Review of patient's allergies indicates:   Allergen Reactions    Ace inhibitors      Hyperkalemia 8/2018  Other reaction(s): Unknown    Penicillins Hives    Tizanidine      Felt hot     Current Outpatient Medications   Medication Sig    amLODIPine (NORVASC) 10 MG tablet Take 1 tablet (10 mg total) by mouth once daily.    atorvastatin (LIPITOR) 80 MG tablet Take 1 tablet (80 mg total) by mouth once daily.    blood sugar diagnostic Strp To check BG 1 times daily, to use with insurance preferred meter    blood-glucose meter kit To check BG 1 times daily, to use with insurance preferred meter    CALCITRIOL ORAL Take 1.25 mcg by mouth 3 (three) times a week.    cholecalciferol, vitamin D3, (VITAMIN D3) 25 mcg (1,000 unit) capsule Take 1 capsule (1,000 Units total) by mouth once daily.    clopidogreL (PLAVIX) 75 mg tablet Take 1 tablet (75 mg total) by mouth once daily.    clotrimazole-betamethasone 1-0.05% (LOTRISONE) cream Apply topically 2 (two) times daily.    ELIQUIS 5 mg Tab Take 1 tablet by mouth twice daily    finasteride (PROSCAR) 5 mg tablet Take 1 tablet (5 mg total) by mouth once daily.    hydrocortisone (WESTCORT) 0.2 % cream  Apply topically 2 (two) times daily.    labetaloL (NORMODYNE) 100 MG tablet Take 1 tablet (100 mg total) by mouth once daily at 6am.    lancets Misc To check BG 1 times daily, to use with insurance preferred meter    levETIRAcetam (KEPPRA) 250 MG Tab Take 1 tablet (250 mg total) by mouth 2 (two) times daily on Tuesday, Thursday, Saturday & Sunday. Take 2 tablets (500mg total) by mouth in the morning and 1 tablet (250mg total) in the evening on dialysis days (Monday, Wednesday, & Friday)    methoxy peg-epoetin beta (MIRCERA INJ) 30 mcg.    RENVELA 800 mg Tab Take 1 tablet (800 mg total) by mouth 3 (three) times daily with meals.    sodium chloride 0.9% SolP 100 mL with iron sucrose 100 mg iron/5 mL Soln 100 mg 50 mg.    tamsulosin (FLOMAX) 0.4 mg Cap Take 2 capsules (0.8 mg total) by mouth once daily.     No current facility-administered medications for this visit.       Review of Systems   Constitutional:  Negative for activity change, fatigue, fever and unexpected weight change.   HENT:  Negative for congestion.    Eyes:  Negative for redness.   Respiratory:  Negative for chest tightness and shortness of breath.    Cardiovascular:  Negative for chest pain and leg swelling.   Gastrointestinal:  Negative for abdominal pain, constipation, diarrhea, nausea and vomiting.   Genitourinary:  Negative for dysuria, flank pain, frequency, hematuria, penile pain, penile swelling, scrotal swelling, testicular pain and urgency.   Musculoskeletal:  Negative for arthralgias and back pain.   Neurological:  Negative for dizziness and light-headedness.   Psychiatric/Behavioral:  Negative for behavioral problems and confusion. The patient is not nervous/anxious.    All other systems reviewed and are negative.    Objective:      There were no vitals filed for this visit.    Physical Exam  Vitals and nursing note reviewed.   Constitutional:       Appearance: He is well-developed.   HENT:      Head: Normocephalic.   Eyes:       Conjunctiva/sclera: Conjunctivae normal.   Neck:      Thyroid: No thyromegaly.      Trachea: No tracheal deviation.   Cardiovascular:      Rate and Rhythm: Normal rate.      Heart sounds: Normal heart sounds.   Pulmonary:      Effort: Pulmonary effort is normal. No respiratory distress.      Breath sounds: Normal breath sounds. No wheezing.   Abdominal:      General: Bowel sounds are normal.      Palpations: Abdomen is soft.      Tenderness: There is no abdominal tenderness. There is no rebound.      Hernia: No hernia is present.   Musculoskeletal:         General: No tenderness. Normal range of motion.      Cervical back: Normal range of motion and neck supple.   Lymphadenopathy:      Cervical: No cervical adenopathy.   Skin:     General: Skin is warm and dry.      Findings: No erythema or rash.   Neurological:      Mental Status: He is alert and oriented to person, place, and time.   Psychiatric:         Behavior: Behavior normal.         Thought Content: Thought content normal.         Judgment: Judgment normal.       Urine dipstick shows not done.  Micro exam: not done.     US Retroperitoneal Complete (Kidney and  Order: [id number]  Status: Final result     Visible to patient: No (inaccessible in Patient Portal)     Next appt: Today at 02:45 PM in Urology (Lee Styles MD)     Dx: BPH with obstruction/lower urinary tr...     0 Result Notes  Details    Reading Physician Reading Date Result Priority   Tien Harrison III, MD  [phone] 10/7/2022 Routine     Narrative & Impression  EXAMINATION:  US RETROPERITONEAL COMPLETE     CLINICAL HISTORY:  Benign prostatic hyperplasia with lower urinary tract symptoms     FINDINGS:  The right kidney measures 7 cm, the left kidney 6.7.  The right resistive index is 0.72, the left 0.82.  Kidneys demonstrate no mass, scar, stone, or hydronephrosis.  The bladder is partially collapsed.  Bladder volume is 13 mL.  Prostate volume is 97.4 mL.      Impression:     No significant abnormality seen.        Electronically signed by: Tien Harrison MD  Date:                                            10/07/2022  Time:                                           08:00           Exam Ended: 10/06/22 17:36             Assessment:       1. Suspected UTI    2. BPH with obstruction/lower urinary tract symptoms    3. Nocturia more than twice per night            Plan:       1. Suspected UTI    - Urine culture    2. BPH with obstruction/lower urinary tract symptoms  Recheck in 6 weeks    - Prostate Specific Antigen, Diagnostic; Future    3. Nocturia more than twice per night  stable             Follow up in about 6 weeks (around 6/13/2023) for Follow up Established, Review PSA.

## 2023-05-02 NOTE — PROGRESS NOTES
Subjective:       Patient ID: Miguel Moore is a 67 y.o. male      Chief Complaint   Patient presents with    Concerns About Ocular Health    Diabetic Eye Exam     History of Present Illness      Dls: 1/6/22 Dr. Rogers     66 y/o male presents today for diabetic eye exam.   Pt c/o double vision ou. Pt does not wear any glasses.     LBS ?     No tearing  No itching  No burning  No pain  No ha's  No floaters  No flashes    Eye meds  None    Hemoglobin A1C       Date                     Value               Ref Range             Status                04/27/2023               5.9 (H)             4.0 - 5.6 %           Final                   11/02/2021               5.9 (H)             4.0 - 5.6 %           Final                  08/13/2020               5.5                 4.0 - 5.6 %           Final                 Assessment/Plan:     1. Type 2 diabetes mellitus without retinopathy  No diabetic retinopathy. Discussed with pt the effects of diabetes on vision, importance of good blood sugar control, compliance with meds, and follow up care with PCP. Return in 1 year for dilated eye exam, sooner PRN.    2. Pseudophakia  3. Bilateral posterior capsular opacification  NVS PCO. Monitor.    4. Refractive error  No diplopia in office with MRx. Educated patient on refractive error and discussed lens options. Dispensed updated spectacle Rx. Educated about adaptation period to new specs.    Eyeglass Final Rx       Eyeglass Final Rx         Sphere Cylinder Axis Add    Right +0.50 Sphere  +2.50    Left +0.25 +1.25 155 +2.50      Expiration Date: 5/2/2024                      Follow up in about 1 year (around 5/2/2024) for Diabetic Eye Exam.

## 2023-05-05 LAB
BACTERIA UR CULT: ABNORMAL
BACTERIA UR CULT: ABNORMAL

## 2023-05-11 ENCOUNTER — TELEPHONE (OUTPATIENT)
Dept: UROLOGY | Facility: CLINIC | Age: 68
End: 2023-05-11
Payer: MEDICARE

## 2023-05-11 DIAGNOSIS — R39.89 SUSPECTED UTI: Primary | ICD-10-CM

## 2023-05-11 RX ORDER — NITROFURANTOIN 25; 75 MG/1; MG/1
100 CAPSULE ORAL 2 TIMES DAILY
Qty: 20 CAPSULE | Refills: 0 | Status: SHIPPED | OUTPATIENT
Start: 2023-05-11 | End: 2023-05-15 | Stop reason: ALTCHOICE

## 2023-05-11 RX ORDER — SULFAMETHOXAZOLE AND TRIMETHOPRIM 800; 160 MG/1; MG/1
1 TABLET ORAL 2 TIMES DAILY
Qty: 10 TABLET | Refills: 0 | Status: SHIPPED | OUTPATIENT
Start: 2023-05-11 | End: 2023-05-15 | Stop reason: ALTCHOICE

## 2023-05-11 NOTE — TELEPHONE ENCOUNTER
Pt notified of urine culture results and [facility]        ----- Message from Lee Styles MD sent at 5/11/2023  7:59 AM CDT -----  Two antibiotics sent to his pharmacy.  Bactrim and Macrobid

## 2023-05-12 ENCOUNTER — TELEPHONE (OUTPATIENT)
Dept: UROLOGY | Facility: HOSPITAL | Age: 68
End: 2023-05-12
Payer: MEDICARE

## 2023-05-12 ENCOUNTER — TELEPHONE (OUTPATIENT)
Dept: UROLOGY | Facility: CLINIC | Age: 68
End: 2023-05-12
Payer: MEDICARE

## 2023-05-12 NOTE — TELEPHONE ENCOUNTER
I spoke with pt and informed him I would send a message to Dr. Styles and would get back with him. He verbalized understanding.

## 2023-05-12 NOTE — TELEPHONE ENCOUNTER
Feeling hot is not necessarily an allergic reaction.  He can take benadryl to make sure.    If he has difficulty breathing or swelling then he should go to the ED.    If this continues to happen then he should stop taking Bactrim.

## 2023-05-12 NOTE — TELEPHONE ENCOUNTER
----- Message from Leanna Reyes MA sent at 5/12/2023  9:50 AM CDT -----  The pt called the office stating he started taking the bactrim and his body is starting feel hot and he is getting overheated. Please advise.

## 2023-05-12 NOTE — TELEPHONE ENCOUNTER
"I spoke with pt and let him know per Dr. Styles,   "Feeling hot is not necessarily an allergic reaction.  He can take benadryl to make sure.     If he has difficulty breathing or swelling then he should go to the ED.     If this continues to happen then he should stop taking Bactrim."  The pt verbalized understanding.  "

## 2023-05-15 ENCOUNTER — TELEPHONE (OUTPATIENT)
Dept: UROLOGY | Facility: CLINIC | Age: 68
End: 2023-05-15
Payer: MEDICARE

## 2023-05-15 DIAGNOSIS — R39.89 SUSPECTED UTI: Primary | ICD-10-CM

## 2023-05-15 RX ORDER — CIPROFLOXACIN 500 MG/1
500 TABLET ORAL 2 TIMES DAILY
Qty: 14 TABLET | Refills: 0 | Status: SHIPPED | OUTPATIENT
Start: 2023-05-15 | End: 2023-05-22

## 2023-05-15 NOTE — TELEPHONE ENCOUNTER
Pt called he stopped taking Bactrim & Macrobid on Friday.  His body felt hot inside and his arm and leg was stiff.  Pt notified I will send Dr. Styles a message and get back with him.        ----- Message from Angella Tsai sent at 5/15/2023  1:14 PM CDT -----  Regarding: [phone]  Type: Patient Call Back    Who called:self    What is the request in detail: pt states that the medication he has been taking prescribed by the provider has been giving him a number side effects, pt will like a call back from the nurse to further discuss.    Can the clinic reply by [facility]?no    Would the patient rather a call back or a response via [facility]? Call back     Best call back number:[phone]      Additional Information:

## 2023-05-16 ENCOUNTER — OFFICE VISIT (OUTPATIENT)
Dept: PODIATRY | Facility: CLINIC | Age: 68
End: 2023-05-16
Payer: MEDICARE

## 2023-05-16 VITALS — HEIGHT: 68 IN | BODY MASS INDEX: 25.92 KG/M2 | WEIGHT: 171.06 LBS

## 2023-05-16 DIAGNOSIS — Z99.2 CONTROLLED TYPE 2 DIABETES MELLITUS WITH CHRONIC KIDNEY DISEASE ON CHRONIC DIALYSIS, WITH LONG-TERM CURRENT USE OF INSULIN: Primary | ICD-10-CM

## 2023-05-16 DIAGNOSIS — Z79.4 CONTROLLED TYPE 2 DIABETES MELLITUS WITH CHRONIC KIDNEY DISEASE ON CHRONIC DIALYSIS, WITH LONG-TERM CURRENT USE OF INSULIN: Primary | ICD-10-CM

## 2023-05-16 DIAGNOSIS — B35.1 ONYCHOMYCOSIS DUE TO DERMATOPHYTE: ICD-10-CM

## 2023-05-16 DIAGNOSIS — N18.6 CONTROLLED TYPE 2 DIABETES MELLITUS WITH CHRONIC KIDNEY DISEASE ON CHRONIC DIALYSIS, WITH LONG-TERM CURRENT USE OF INSULIN: Primary | ICD-10-CM

## 2023-05-16 DIAGNOSIS — L84 CORN OR CALLUS: ICD-10-CM

## 2023-05-16 DIAGNOSIS — E11.22 CONTROLLED TYPE 2 DIABETES MELLITUS WITH CHRONIC KIDNEY DISEASE ON CHRONIC DIALYSIS, WITH LONG-TERM CURRENT USE OF INSULIN: Primary | ICD-10-CM

## 2023-05-16 PROCEDURE — 99499 UNLISTED E&M SERVICE: CPT | Mod: HCNC,S$GLB,, | Performed by: PODIATRIST

## 2023-05-16 PROCEDURE — 99499 UNLISTED E&M SERVICE: CPT | Mod: ,,, | Performed by: PODIATRIST

## 2023-05-16 PROCEDURE — 99999 PR PBB SHADOW E&M-EST. PATIENT-LVL III: ICD-10-PCS | Mod: PBBFAC,,, | Performed by: PODIATRIST

## 2023-05-16 PROCEDURE — 99999 PR PBB SHADOW E&M-EST. PATIENT-LVL III: CPT | Mod: PBBFAC,,, | Performed by: PODIATRIST

## 2023-05-16 PROCEDURE — 99499 RISK ADDL DX/OHS AUDIT: ICD-10-PCS | Mod: HCNC,S$GLB,, | Performed by: PODIATRIST

## 2023-05-16 PROCEDURE — 11055 PARING/CUTG B9 HYPRKER LES 1: CPT | Mod: Q9,,, | Performed by: PODIATRIST

## 2023-05-16 PROCEDURE — 11721 PR DEBRIDEMENT OF NAILS, 6 OR MORE: ICD-10-PCS | Mod: 59,Q9,, | Performed by: PODIATRIST

## 2023-05-16 PROCEDURE — 11721 DEBRIDE NAIL 6 OR MORE: CPT | Mod: 59,Q9,, | Performed by: PODIATRIST

## 2023-05-16 PROCEDURE — 11055 PR TRIM HYPERKERATOTIC SKIN LESION, ONE: ICD-10-PCS | Mod: Q9,,, | Performed by: PODIATRIST

## 2023-05-16 NOTE — PROGRESS NOTES
Subjective:      Patient ID: Miguel Moore is a 67 y.o. male.    Chief Complaint: Diabetes Mellitus (8/18/22 DR Raymond PCP) and Nail Care      Miguel is a 67 y.o. male who presents to the clinic for evaluation and treatment of high risk feet. Miguel has a past medical history of Allergy, Clotting disorder, Deep vein thrombosis, Diabetes mellitus, type 2, Hypertension, Nuclear sclerosis of both eyes (6/9/2017), Renal disorder, Seizures, Stroke, and Urinary tract infection.   This patient has documented high risk feet requiring routine maintenance secondary to diabetes mellitis and those secondary complications of diabetes, as mentioned.   Presents for diabetic foot risk assessment.         PCP: Raciel Raymond MD    Date Last Seen by PCP:   Chief Complaint   Patient presents with    Diabetes Mellitus     8/18/22 DR Raymond PCP    Nail Care           Current shoe gear:   Tennis shoes         Hemoglobin A1C   Date Value Ref Range Status   04/27/2023 5.9 (H) 4.0 - 5.6 % Final     Comment:     ADA Screening Guidelines:  5.7-6.4%  Consistent with prediabetes  >or=6.5%  Consistent with diabetes    High levels of fetal hemoglobin interfere with the HbA1C  assay. Heterozygous hemoglobin variants (HbS, HgC, etc)do  not significantly interfere with this assay.   However, presence of multiple variants may affect accuracy.     11/02/2021 5.9 (H) 4.0 - 5.6 % Final     Comment:     ADA Screening Guidelines:  5.7-6.4%  Consistent with prediabetes  >or=6.5%  Consistent with diabetes    High levels of fetal hemoglobin interfere with the HbA1C  assay. Heterozygous hemoglobin variants (HbS, HgC, etc)do  not significantly interfere with this assay.   However, presence of multiple variants may affect accuracy.     08/13/2020 5.5 4.0 - 5.6 % Final     Comment:     ADA Screening Guidelines:  5.7-6.4%  Consistent with prediabetes  >or=6.5%  Consistent with diabetes  High levels of fetal hemoglobin interfere with the HbA1C  assay.  Heterozygous hemoglobin variants (HbS, HgC, etc)do  not significantly interfere with this assay.   However, presence of multiple variants may affect accuracy.       Past Medical History:   Diagnosis Date    Allergy     Clotting disorder     Elaquis and APlavix    Deep vein thrombosis     Diabetes mellitus, type 2     Hypertension     Nuclear sclerosis of both eyes 6/9/2017    Renal disorder     Seizures     Stroke     Urinary tract infection        Past Surgical History:   Procedure Laterality Date    CATARACT EXTRACTION W/  INTRAOCULAR LENS IMPLANT Right 10/05/2017    Dr. Tay    CATARACT EXTRACTION W/  INTRAOCULAR LENS IMPLANT Left 10/19/2017    Dr. Tay    CYSTOSCOPY W/ RETROGRADES Bilateral 2/18/2021    Procedure: CYSTOSCOPY, WITH RETROGRADE PYELOGRAM;  Surgeon: JEMMA Styles MD;  Location: [facility] OR;  Service: Urology;  Laterality: Bilateral;  REQUESTING EARLY AS POSSIBE-LO / 2/8/2021 @ 1:13PM  RN Pre Op 2-11-21, Covid NEGATIVE ON  2-17-21.  C A    ESOPHAGOGASTRODUODENOSCOPY N/A 8/17/2020    Procedure: EGD (ESOPHAGOGASTRODUODENOSCOPY);  Surgeon: Desmond Chapman MD;  Location: [facility] ENDO;  Service: Endoscopy;  Laterality: N/A;    EYE SURGERY Bilateral     cataract    FEMORAL ARTERY STENT      FRACTURE SURGERY      KNEE SURGERY      bilateral scope       Family History   Problem Relation Age of Onset    Diabetes Mother     Heart disease Mother     Hypertension Mother     Cataracts Mother     No Known Problems Father     Hypertension Sister     No Known Problems Brother     No Known Problems Maternal Aunt     No Known Problems Maternal Uncle     No Known Problems Paternal Aunt     No Known Problems Paternal Uncle     No Known Problems Maternal Grandmother     No Known Problems Maternal Grandfather     No Known Problems Paternal Grandmother     No Known Problems Paternal Grandfather     No Known Problems Daughter     No Known Problems Other     Amblyopia Neg Hx     Blindness Neg Hx     Cancer Neg Hx      Glaucoma Neg Hx     Macular degeneration Neg Hx     Retinal detachment Neg Hx     Strabismus Neg Hx     Stroke Neg Hx     Thyroid disease Neg Hx        Social History     Socioeconomic History    Marital status: Single   Occupational History    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 90 tablet 3    atorvastatin (LIPITOR) 80 MG tablet Take 1 tablet (80 mg total) by mouth once daily. 90 tablet 3    blood sugar diagnostic Strp To check BG 1 times daily, to use with insurance preferred meter 100 strip 3    blood-glucose meter kit To check BG 1 times daily, to use with insurance preferred meter 1 each 0    CALCITRIOL ORAL Take 1.25 mcg by mouth 3 (three) times a week.      cholecalciferol, vitamin D3, (VITAMIN D3) 25 mcg (1,000 unit) capsule Take 1 capsule (1,000 Units total) by mouth once daily. 90 capsule 3    ciprofloxacin HCl (CIPRO) 500 MG tablet Take 1 tablet (500 mg total) by mouth 2 (two) times daily. for 7 days 14 tablet 0    clopidogreL (PLAVIX) 75 mg tablet Take 1 tablet (75 mg total) by mouth once daily. 90 tablet 3    clotrimazole-betamethasone 1-0.05% (LOTRISONE) cream Apply topically 2 (two) times daily. 15 g 0    ELIQUIS 5 mg Tab Take 1 tablet by mouth twice daily 180 tablet 3    finasteride (PROSCAR) 5 mg tablet Take 1 tablet (5 mg total) by mouth once daily. 90 tablet 3    hydrocortisone (WESTCORT) 0.2 % cream Apply topically 2 (two) times daily. 15 g 0    labetaloL (NORMODYNE) 100 MG tablet Take 1 tablet (100 mg total) by mouth once daily at 6am. 90 tablet 3    lancets Misc To check BG 1 times daily, to use with insurance preferred meter 100  each 4    levETIRAcetam (KEPPRA) 250 MG Tab Take 1 tablet (250 mg total) by mouth 2 (two) times daily on Tuesday, Thursday, Saturday & Sunday. Take 2 tablets (500mg total) by mouth in the morning and 1 tablet (250mg total) in the evening on dialysis days (Monday, Wednesday, & Friday) 80 tablet 11    methoxy peg-epoetin beta (MIRCERA INJ) 30 mcg.      RENVELA 800 mg Tab Take 1 tablet (800 mg total) by mouth 3 (three) times daily with meals. 90 tablet 0    sodium chloride 0.9% SolP 100 mL with iron sucrose 100 mg iron/5 mL Soln 100 mg 50 mg.      tamsulosin (FLOMAX) 0.4 mg Cap Take 2 capsules (0.8 mg total) by mouth once daily. 180 capsule 3     No current facility-administered medications for this visit.       Review of patient's allergies indicates:   Allergen Reactions    Penicillins Hives       Review of Systems   Constitutional: Negative for chills and fever.   Cardiovascular:  Positive for leg swelling. Negative for chest pain and claudication.   Respiratory:  Negative for cough and shortness of breath.    Skin:  Positive for dry skin and suspicious lesions (corns/callus).   Musculoskeletal:  Positive for muscle weakness.   Gastrointestinal:  Negative for nausea and vomiting.   Neurological:  Positive for focal weakness (right sided), numbness and sensory change. Negative for paresthesias.   Psychiatric/Behavioral:  Negative for altered mental status.          Objective:      Physical Exam  Vitals reviewed.   Constitutional:       Appearance: He is well-developed.   HENT:      Head: Normocephalic.   Cardiovascular:      Pulses:           Dorsalis pedis pulses are 2+ on the right side and 2+ on the left side.        Posterior tibial pulses are 1+ on the right side and 1+ on the left side.      Comments: CRT < 3 sec to tips of toes. 2+ pitting edema noted to b/l LE. No vericosities noted to b/l LEs.     Pulmonary:      Effort: No respiratory distress.   Musculoskeletal:      Comments: Gastrocnemius equinus noted  to b/l ankles with decreased DF noted on exam. MMT 5/5 in DF/PF/Inv/Ev resistance with no reproduction of pain in any direction Right, 3/5 left. Passive range of motion of ankle and pedal joints is painless. Adequate pedal joint ROM.     Rigid hammertoe contractures noted to toes 2-4 R-asymptomatic.     Limited hallux MTPJ ROM with plantarflexion contracture of b/l hallux IPJ, rigid R semi-reducible L.      A mass of size 2 cm is felt in the right dorsal foot.     Skin:     General: Skin is warm and dry.      Findings: Lesion present. No ecchymosis or erythema.      Comments: No open lesions, lacerations or wounds noted. Nails are dystrophic, elongated, thickened with yellow/brown discoloration to R 1 and L 1-5. Remaining toenails normotrophic.  Interdigital spaces clean, dry and intact b/l. No erythema noted to b/l foot. Skin texture thin, shiny, atrophic. Pedal hair absent. Toes cool to touch b/l.     Pre ulcerative callus right heel.    Neurological:      Mental Status: He is alert and oriented to person, place, and time.      Sensory: Sensory deficit present.      Comments: Light touch, proprioception, and sharp/dull sensation are all intact bilaterally. Protective threshold with the [place]-Wienstein monofilament is intact bilaterally. Vibratory sensation diminished b/l distal foot.      Psychiatric:         Behavior: Behavior normal.         Thought Content: Thought content normal.         Judgment: Judgment normal.       Assessment:       No diagnosis found.            Plan:       There are no diagnoses linked to this encounter.        I counseled the patient on his conditions, their implications and medical management.    - Shoe inspection. Diabetic Foot Education. Patient reminded of the importance of good nutrition and blood sugar control to help prevent podiatric complications of diabetes. Patient instructed on proper foot hygeine. We discussed wearing proper shoe gear, daily foot inspections, never  walking without protective shoe gear, never putting sharp instruments to feet, routine podiatric nail visits every 2-3 months.   - With patient's permission, nails were aggressively reduced and debrided x 10 to their soft tissue attachment mechanically and with electric , removing all offending nail and debris. Patient relates relief following the procedure. He will continue to monitor the areas daily, inspect his feet, wear protective shoe gear when ambulatory, moisturizer to maintain skin integrity and follow in this office in approximately 2-3 months, sooner p.r.n.   - After cleansing the area w/ alcohol prep pad the above mentioned hyperkeratosis was trimmed utilizing No 15 scapel, to a smooth base with out incident.     Sabi Douglas DPM

## 2023-05-30 ENCOUNTER — TELEPHONE (OUTPATIENT)
Dept: INTERVENTIONAL RADIOLOGY/VASCULAR | Facility: HOSPITAL | Age: 68
End: 2023-05-30
Payer: MEDICARE

## 2023-05-30 ENCOUNTER — INITIAL CONSULT (OUTPATIENT)
Dept: VASCULAR SURGERY | Facility: CLINIC | Age: 68
End: 2023-05-30
Payer: MEDICARE

## 2023-05-30 VITALS
WEIGHT: 176.5 LBS | BODY MASS INDEX: 26.83 KG/M2 | SYSTOLIC BLOOD PRESSURE: 122 MMHG | DIASTOLIC BLOOD PRESSURE: 74 MMHG | HEART RATE: 73 BPM

## 2023-05-30 DIAGNOSIS — N18.6 ESRD (END STAGE RENAL DISEASE): Primary | ICD-10-CM

## 2023-05-30 PROCEDURE — 99999 PR PBB SHADOW E&M-EST. PATIENT-LVL III: ICD-10-PCS | Mod: PBBFAC,,, | Performed by: SURGERY

## 2023-05-30 PROCEDURE — 99499 UNLISTED E&M SERVICE: CPT | Mod: HCNC,S$GLB,, | Performed by: SURGERY

## 2023-05-30 PROCEDURE — 99204 PR OFFICE/OUTPT VISIT, NEW, LEVL IV, 45-59 MIN: ICD-10-PCS | Mod: S$GLB,,, | Performed by: SURGERY

## 2023-05-30 PROCEDURE — 99204 OFFICE O/P NEW MOD 45 MIN: CPT | Mod: S$GLB,,, | Performed by: SURGERY

## 2023-05-30 PROCEDURE — 99999 PR PBB SHADOW E&M-EST. PATIENT-LVL III: CPT | Mod: PBBFAC,,, | Performed by: SURGERY

## 2023-05-30 NOTE — TELEPHONE ENCOUNTER
Attempted to call patient to schedule IR procedure. Unable to reach patient, a voicemail was left with our contact information ([phone]).

## 2023-05-30 NOTE — PROGRESS NOTES
Dipak Lim MD [facility]                         05/30/2023    HPI:  Miguel Moore is a 67 y.o. male with   Patient Active Problem List   Diagnosis    Benign essential HTN    Pure hypercholesterolemia    Controlled type 2 diabetes mellitus with chronic kidney disease on chronic dialysis, with long-term current use of insulin    BPH (benign prostatic hyperplasia) 2/18/21 prostate bx normal    Seizure disorder as sequela of cerebrovascular accident    Hemiplegia and hemiparesis following cerebral infarction affecting right dominant side    Microcytosis 9/2019 labs c/w AoCD and AoCKD    ESRD (end stage renal disease)    Nuclear sclerosis, left    Cortical cataract of both eyes    DM type 2 without retinopathy    Secondary hyperparathyroidism of renal origin    Vitamin D deficiency    Right sided weakness    Senile nuclear sclerosis    CME (cystoid macular edema), bilateral    Refractive error    History of stroke    Type 2 diabetes mellitus with diabetic neuropathy, with long-term current use of insulin    Nephrotic syndrome    Anasarca    Hypocalcemia    History of fungal infection candida parasilosis hospitalization 8/2020    Chronic deep vein thrombosis (DVT) of popliteal vein of right lower extremity 9/[place]/20 outside [place]; unprovoked; anticoagulation    Pancytopenia    Elevated liver enzymes    Pulmonary nodule 1/2021 4 mm nodule.    Hematuria    Elevated PSA 2/18/21 prostate bx normal    Anemia in chronic kidney disease    History of diabetic ulcer of foot    Pseudophakia    Bilateral posterior capsular opacification    being managed by PCP and specialists who is in need for evaluation of long term dialysis access.  s/p LUE brachiocephalic AVF 2020 by Dr. Perez.  No current issues with HD. no extremity pain and swelling.       Past Medical History:   Diagnosis Date    Allergy     Clotting disorder     Elaquis and APlavix    Deep vein thrombosis      Diabetes mellitus, type 2     Hypertension     Nuclear sclerosis of both eyes 6/9/2017    Renal disorder     Seizures     Stroke     Urinary tract infection      Past Surgical History:   Procedure Laterality Date    CATARACT EXTRACTION W/  INTRAOCULAR LENS IMPLANT Right 10/05/2017    Dr. Tay    CATARACT EXTRACTION W/  INTRAOCULAR LENS IMPLANT Left 10/19/2017    Dr. Tay    CYSTOSCOPY W/ RETROGRADES Bilateral 2/18/2021    Procedure: CYSTOSCOPY, WITH RETROGRADE PYELOGRAM;  Surgeon: JEMMA Styles MD;  Location: [facility] OR;  Service: Urology;  Laterality: Bilateral;  REQUESTING EARLY AS POSSIBE-LO / 2/8/2021 @ 1:13PM  RN Pre Op 2-11-21, Covid NEGATIVE ON  2-17-21.  C A    ESOPHAGOGASTRODUODENOSCOPY N/A 8/17/2020    Procedure: EGD (ESOPHAGOGASTRODUODENOSCOPY);  Surgeon: Desmond Chapman MD;  Location: [facility] ENDO;  Service: Endoscopy;  Laterality: N/A;    EYE SURGERY Bilateral     cataract    FEMORAL ARTERY STENT      FRACTURE SURGERY      KNEE SURGERY      bilateral scope     Family History   Problem Relation Age of Onset    Diabetes Mother     Heart disease Mother     Hypertension Mother     Cataracts Mother     No Known Problems Father     Hypertension Sister     No Known Problems Brother     No Known Problems Maternal Aunt     No Known Problems Maternal Uncle     No Known Problems Paternal Aunt     No Known Problems Paternal Uncle     No Known Problems Maternal Grandmother     No Known Problems Maternal Grandfather     No Known Problems Paternal Grandmother     No Known Problems Paternal Grandfather     No Known Problems Daughter     No Known Problems Other     Amblyopia Neg Hx     Blindness Neg Hx     Cancer Neg Hx     Glaucoma Neg Hx     Macular degeneration Neg Hx     Retinal detachment Neg Hx     Strabismus Neg Hx     Stroke Neg Hx     Thyroid disease Neg Hx      Social History     Socioeconomic History    Marital status: Single   Occupational History    Occupation: disabled - former   - dozers, etc   Tobacco Use    Smoking status: Never    Smokeless tobacco: Never   Substance and Sexual Activity    Alcohol use: No    Drug use: No   Social History Narrative    Lives with daughter       Current Outpatient Medications:     amLODIPine (NORVASC) 10 MG tablet, Take 1 tablet (10 mg total) by mouth once daily., Disp: 90 tablet, Rfl: 3    atorvastatin (LIPITOR) 80 MG tablet, Take 1 tablet (80 mg total) by mouth once daily., Disp: 90 tablet, Rfl: 3    blood sugar diagnostic Strp, To check BG 1 times daily, to use with insurance preferred meter, Disp: 100 strip, Rfl: 3    blood-glucose meter kit, To check BG 1 times daily, to use with insurance preferred meter, Disp: 1 each, Rfl: 0    CALCITRIOL ORAL, Take 1.25 mcg by mouth 3 (three) times a week., Disp: , Rfl:     cholecalciferol, vitamin D3, (VITAMIN D3) 25 mcg (1,000 unit) capsule, Take 1 capsule (1,000 Units total) by mouth once daily., Disp: 90 capsule, Rfl: 3    clopidogreL (PLAVIX) 75 mg tablet, Take 1 tablet (75 mg total) by mouth once daily., Disp: 90 tablet, Rfl: 3    clotrimazole-betamethasone 1-0.05% (LOTRISONE) cream, Apply topically 2 (two) times daily., Disp: 15 g, Rfl: 0    ELIQUIS 5 mg Tab, Take 1 tablet by mouth twice daily, Disp: 180 tablet, Rfl: 3    finasteride (PROSCAR) 5 mg tablet, Take 1 tablet (5 mg total) by mouth once daily., Disp: 90 tablet, Rfl: 3    hydrocortisone (WESTCORT) 0.2 % cream, Apply topically 2 (two) times daily., Disp: 15 g, Rfl: 0    labetaloL (NORMODYNE) 100 MG tablet, Take 1 tablet (100 mg total) by mouth once daily at 6am., Disp: 90 tablet, Rfl: 3    lancets Misc, To check BG 1 times daily, to use with insurance preferred meter, Disp: 100 each, Rfl: 4    levETIRAcetam (KEPPRA) 250 MG Tab, Take 1 tablet (250 mg total) by mouth 2 (two) times daily on Tuesday, Thursday, Saturday & Sunday. Take 2 tablets (500mg total) by mouth in the morning and 1 tablet (250mg total) in the evening on dialysis days (Monday,  Wednesday, & Friday), Disp: 80 tablet, Rfl: 11    methoxy peg-epoetin beta (MIRCERA INJ), 30 mcg., Disp: , Rfl:     RENVELA 800 mg Tab, Take 1 tablet (800 mg total) by mouth 3 (three) times daily with meals., Disp: 90 tablet, Rfl: 0    sodium chloride 0.9% SolP 100 mL with iron sucrose 100 mg iron/5 mL Soln 100 mg, 50 mg., Disp: , Rfl:     tamsulosin (FLOMAX) 0.4 mg Cap, Take 2 capsules (0.8 mg total) by mouth once daily., Disp: 180 capsule, Rfl: 3    REVIEW OF SYSTEMS:  General: No fevers or chills; ENT: No sore throat; Allergy and Immunology: no persistent infections; Hematological and Lymphatic: No history of bleeding or easy bruising; Endocrine: negative; Respiratory: no cough, shortness of breath, or wheezing; Cardiovascular: no chest pain or dyspnea on exertion; Gastrointestinal: no abdominal pain/back, change in bowel habits, or bloody stools; Genito-Urinary: no dysuria, trouble voiding, or hematuria; Musculoskeletal: negative; Neurological: no TIA or stroke symptoms; Psychiatric: no nervousness, anxiety or depression.    PHYSICAL EXAM:      Pulse: 73         General appearance:  Alert, well-appearing, and in no distress.  Oriented to person, place, and time                    Neurological: Normal speech, no focal findings noted; CN II - XII grossly intact. BUE with sensation to light touch.            Musculoskeletal: Digits/nail without cyanosis/clubbing.  Strength 5/5 BUE.                    Neck: Supple                  Chest:   No use of accessory muscles               Cardiac: Normal rate and regular rhythm, S1 and S2 normal            Abdomen: Soft           Extremities:   2 + R radial pulse, 2 + L radial pulse     2 + R brachial pulse, 2 + L brachial pulse     no RUE edema, no LUE edema     negative Blane's test RUE, negative Blane's test LUE    Skin: No tissue loss    LAB RESULTS:  No results found for: CBC  Lab Results   Component Value Date    LABPROT 12.8 (H) 01/08/2021    INR 1.2 01/08/2021      Lab Results   Component Value Date     (L) 02/11/2021    K 4.6 02/11/2021    CL 96 02/11/2021    CO2 26 02/11/2021     02/11/2021    BUN 25 (H) 02/11/2021    CREATININE 5.0 (H) 02/11/2021    CALCIUM 7.8 (L) 02/11/2021    ANIONGAP 9 02/11/2021    EGFRNONAA 11 (A) 02/11/2021     Lab Results   Component Value Date    WBC 2.66 (L) 02/11/2021    RBC 3.61 (L) 02/11/2021    HGB 8.8 (L) 02/11/2021    HCT 26.3 (L) 02/11/2021    MCV 73 (L) 02/11/2021    MCH 24.4 (L) 02/11/2021    MCHC 33.5 02/11/2021    RDW 18.1 (H) 02/11/2021     (L) 02/11/2021    MPV 9.1 (L) 02/11/2021    GRAN 1.6 (L) 02/11/2021    GRAN 58.3 02/11/2021    LYMPH 0.5 (L) 02/11/2021    LYMPH 18.8 02/11/2021    MONO 0.5 02/11/2021    MONO 18.8 (H) 02/11/2021    EOS 0.1 02/11/2021    BASO 0.03 02/11/2021    EOSINOPHIL 2.6 02/11/2021    BASOPHIL 1.1 02/11/2021    DIFFMETHOD Automated 02/11/2021     .  Lab Results   Component Value Date    HGBA1C 5.9 (H) 04/27/2023       IMAGING:  All pertinent imaging has been reviewed and interpreted independently.    IMP/PLAN:  67 y.o. male with   Patient Active Problem List   Diagnosis    Benign essential HTN    Pure hypercholesterolemia    Controlled type 2 diabetes mellitus with chronic kidney disease on chronic dialysis, with long-term current use of insulin    BPH (benign prostatic hyperplasia) 2/18/21 prostate bx normal    Seizure disorder as sequela of cerebrovascular accident    Hemiplegia and hemiparesis following cerebral infarction affecting right dominant side    Microcytosis 9/2019 labs c/w AoCD and AoCKD    ESRD (end stage renal disease)    Nuclear sclerosis, left    Cortical cataract of both eyes    DM type 2 without retinopathy    Secondary hyperparathyroidism of renal origin    Vitamin D deficiency    Right sided weakness    Senile nuclear sclerosis    CME (cystoid macular edema), bilateral    Refractive error    History of stroke    Type 2 diabetes mellitus with diabetic neuropathy,  with long-term current use of insulin    Nephrotic syndrome    Anasarca    Hypocalcemia    History of fungal infection candida parasilosis hospitalization 8/2020    Chronic deep vein thrombosis (DVT) of popliteal vein of right lower extremity 9/[place]/20 outside [place]; unprovoked; anticoagulation    Pancytopenia    Elevated liver enzymes    Pulmonary nodule 1/2021 4 mm nodule.    Hematuria    Elevated PSA 2/18/21 prostate bx normal    Anemia in chronic kidney disease    History of diabetic ulcer of foot    Pseudophakia    Bilateral posterior capsular opacification    being managed by PCP and specialists who is here today for evaluation of long term HD access.    -Recommend LUE fistulogram , radial access 6/6/23  -Cont renal diet    I spent 11 minutes evaluating this patient and greater than 50% of the time was spent counseling, coordinator care and discussing the plan of care.  All questions were answered and patient stated understanding with agreement with the above treatment plan.    Dipak Lim MD [facility]  Vascular and Endovascular Surgery

## 2023-06-01 ENCOUNTER — TELEPHONE (OUTPATIENT)
Dept: VASCULAR SURGERY | Facility: CLINIC | Age: 68
End: 2023-06-01
Payer: MEDICARE

## 2023-06-01 NOTE — TELEPHONE ENCOUNTER
Talked with Pt to give them the Pre Op dept number.    ----- Message from Beth Martinez sent at 6/1/2023  9:11 AM CDT -----  Type: Patient Call Back    Who called:pt     What is the request in detail:pt requesting to discuss upcoming pre op. Please call pt     Can the clinic reply by [facility]LEX[facility]NER?    Would the patient rather a call back or a response via [facility]? call    Best call back number:[phone] (home)       Additional Information:

## 2023-06-02 ENCOUNTER — HOSPITAL ENCOUNTER (OUTPATIENT)
Dept: PREADMISSION TESTING | Facility: HOSPITAL | Age: 68
Discharge: HOME | End: 2023-06-02
Attending: SURGERY
Payer: MEDICARE

## 2023-06-02 VITALS — BODY MASS INDEX: 27.13 KG/M2 | WEIGHT: 179 LBS | HEIGHT: 68 IN

## 2023-06-02 NOTE — PLAN OF CARE
Pre-operative instructions, medication directives and pain scales reviewed with patient. All questions the patient had were answered. Re-assurance about surgical procedure and day of surgery routine given as needed, patient verbalized understanding of the pre-op instructions.     Phone preop done.  Arrival 930 am

## 2023-06-05 ENCOUNTER — HOSPITAL ENCOUNTER (OUTPATIENT)
Dept: CARDIOLOGY | Facility: HOSPITAL | Age: 68
Discharge: HOME | End: 2023-06-05
Attending: SURGERY
Payer: MEDICARE

## 2023-06-06 ENCOUNTER — TELEPHONE (OUTPATIENT)
Dept: INTERVENTIONAL RADIOLOGY/VASCULAR | Facility: HOSPITAL | Age: 68
End: 2023-06-06

## 2023-06-14 DIAGNOSIS — N18.6 ESRD (END STAGE RENAL DISEASE): Primary | ICD-10-CM

## 2023-06-20 ENCOUNTER — TELEPHONE (OUTPATIENT)
Dept: FAMILY MEDICINE | Facility: CLINIC | Age: 68
End: 2023-06-20
Payer: MEDICARE

## 2023-06-26 ENCOUNTER — HOSPITAL ENCOUNTER (OUTPATIENT)
Dept: PREADMISSION TESTING | Facility: HOSPITAL | Age: 68
Discharge: HOME | End: 2023-06-26
Attending: SURGERY
Payer: MEDICARE

## 2023-06-26 DIAGNOSIS — Z01.818 PREOPERATIVE TESTING: Primary | ICD-10-CM

## 2023-06-26 NOTE — PLAN OF CARE
Pre-operative instructions, medication directives and pain scales reviewed with patient. All questions the patient had were answered. Re-assurance about surgical procedure and day of surgery routine given as needed, patient verbalized understanding of the pre-op instructions.     Phone preop done to review previous preop call. Arrival time 730 am

## 2023-06-29 ENCOUNTER — HOSPITAL ENCOUNTER (OUTPATIENT)
Dept: INTERVENTIONAL RADIOLOGY/VASCULAR | Facility: HOSPITAL | Age: 68
Discharge: HOME | End: 2023-06-29
Attending: SURGERY
Payer: MEDICARE

## 2023-06-29 VITALS
HEART RATE: 78 BPM | RESPIRATION RATE: 16 BRPM | TEMPERATURE: 98 F | OXYGEN SATURATION: 99 % | DIASTOLIC BLOOD PRESSURE: 59 MMHG | SYSTOLIC BLOOD PRESSURE: 123 MMHG

## 2023-06-29 DIAGNOSIS — N18.6 ESRD (END STAGE RENAL DISEASE): ICD-10-CM

## 2023-06-29 LAB — POCT GLUCOSE: 104 MG/DL (ref 70–110)

## 2023-06-29 PROCEDURE — C1894 INTRO/SHEATH, NON-LASER: HCPCS | Mod: HCNC

## 2023-06-29 PROCEDURE — C1725 CATH, TRANSLUMIN NON-LASER: HCPCS | Mod: HCNC

## 2023-06-29 PROCEDURE — 82962 GLUCOSE BLOOD TEST: CPT | Mod: HCNC

## 2023-06-29 PROCEDURE — 36902 PR INTRO CATH, DIALYSIS CIRCUIT W/TRANSLML BALLOON ANGIO: ICD-10-PCS | Mod: HCNC,,, | Performed by: SURGERY

## 2023-06-29 PROCEDURE — 99152 MOD SED SAME PHYS/QHP 5/>YRS: CPT | Mod: HCNC

## 2023-06-29 PROCEDURE — 99153 MOD SED SAME PHYS/QHP EA: CPT | Mod: HCNC

## 2023-06-29 PROCEDURE — 99152 PR MOD CONSCIOUS SEDATION, SAME PHYS, 5+ YRS, FIRST 15 MIN: ICD-10-PCS | Mod: HCNC,,, | Performed by: SURGERY

## 2023-06-29 PROCEDURE — 25000003 PHARM REV CODE 250: Mod: HCNC | Performed by: SURGERY

## 2023-06-29 PROCEDURE — 36902 INTRO CATH DIALYSIS CIRCUIT: CPT | Mod: HCNC,,, | Performed by: SURGERY

## 2023-06-29 PROCEDURE — 99152 MOD SED SAME PHYS/QHP 5/>YRS: CPT | Mod: HCNC,,, | Performed by: SURGERY

## 2023-06-29 PROCEDURE — 25500020 PHARM REV CODE 255: Mod: HCNC | Performed by: SURGERY

## 2023-06-29 PROCEDURE — 63600175 PHARM REV CODE 636 W HCPCS: Mod: HCNC | Performed by: SURGERY

## 2023-06-29 PROCEDURE — 36902 INTRO CATH DIALYSIS CIRCUIT: CPT | Mod: HCNC | Performed by: SURGERY

## 2023-06-29 RX ORDER — HEPARIN SODIUM 1000 [USP'U]/ML
INJECTION, SOLUTION INTRAVENOUS; SUBCUTANEOUS
Status: COMPLETED | OUTPATIENT
Start: 2023-06-29 | End: 2023-06-29

## 2023-06-29 RX ORDER — CLINDAMYCIN PHOSPHATE 900 MG/50ML
900 INJECTION, SOLUTION INTRAVENOUS
Status: COMPLETED | OUTPATIENT
Start: 2023-06-29 | End: 2023-06-29

## 2023-06-29 RX ORDER — LIDOCAINE HYDROCHLORIDE 10 MG/ML
INJECTION INFILTRATION; PERINEURAL
Status: COMPLETED | OUTPATIENT
Start: 2023-06-29 | End: 2023-06-29

## 2023-06-29 RX ORDER — MIDAZOLAM HYDROCHLORIDE 1 MG/ML
INJECTION INTRAMUSCULAR; INTRAVENOUS
Status: COMPLETED | OUTPATIENT
Start: 2023-06-29 | End: 2023-06-29

## 2023-06-29 RX ORDER — FENTANYL CITRATE 50 UG/ML
INJECTION, SOLUTION INTRAMUSCULAR; INTRAVENOUS
Status: COMPLETED | OUTPATIENT
Start: 2023-06-29 | End: 2023-06-29

## 2023-06-29 RX ADMIN — MIDAZOLAM HYDROCHLORIDE 1 MG: 1 INJECTION, SOLUTION INTRAMUSCULAR; INTRAVENOUS at 09:06

## 2023-06-29 RX ADMIN — CLINDAMYCIN PHOSPHATE 900 MG: 18 INJECTION, SOLUTION INTRAVENOUS at 09:06

## 2023-06-29 RX ADMIN — IOHEXOL 65 ML: 300 INJECTION, SOLUTION INTRAVENOUS at 10:06

## 2023-06-29 RX ADMIN — HEPARIN SODIUM 3000 UNITS: 1000 INJECTION, SOLUTION INTRAVENOUS; SUBCUTANEOUS at 09:06

## 2023-06-29 RX ADMIN — MIDAZOLAM HYDROCHLORIDE 0.5 MG: 1 INJECTION, SOLUTION INTRAMUSCULAR; INTRAVENOUS at 10:06

## 2023-06-29 RX ADMIN — FENTANYL CITRATE 50 MCG: 50 INJECTION, SOLUTION INTRAMUSCULAR; INTRAVENOUS at 09:06

## 2023-06-29 RX ADMIN — FENTANYL CITRATE 25 MCG: 50 INJECTION, SOLUTION INTRAMUSCULAR; INTRAVENOUS at 10:06

## 2023-06-29 RX ADMIN — LIDOCAINE HYDROCHLORIDE 1 ML: 10 INJECTION, SOLUTION INFILTRATION; PERINEURAL at 09:06

## 2023-06-29 NOTE — BRIEF OP NOTE
[facility] - Interventional Radiology  Brief Operative Note    Surgery Date: 6/29/23    Surgeon: Dipak Lim MD    Assisting: None    Pre-op Diagnosis:  Arteriovenous fistula stenosis, initial encounter    Post-op Diagnosis:  same    Procedure:LUE fistulogram with PTA of proximal fistula and cephalic arch    Anesthesia: Moderate sedation    Operative Findings: adequate radial for access    Estimated Blood Loss: <10 cc         Specimens:   Specimen (24h ago, onward)      None              Discharge Note    OUTCOME: Patient tolerated treatment/procedure well without complication and is now ready for discharge.    DISPOSITION: Home or Self Care    FINAL DIAGNOSIS:  <principal problem not specified>    FOLLOWUP: In clinic    DISCHARGE INSTRUCTIONS:    Discharge Procedure Orders   Remove dressing in 48 hours     Activity as tolerated

## 2023-06-29 NOTE — PLAN OF CARE
Pt verbalized readiness to go home and understanding of discharge instructions. Dressing CDI, VSS.

## 2023-06-29 NOTE — H&P
The patient has been examined and the H&P has been reviewed:  I concur with the findings and no changes have occurred since H&P was written.    Mallampati Scale Class 2   ASA Classification Class III         Anesthesia/Surgery risks, benefits and alternative options discussed and understood by patient/family.    There are no hospital problems to display for this patient.

## 2023-06-29 NOTE — Clinical Note
Left: Arm.   Scrubbed with Chlorohexidine.    Hair: N/A.  Skin prep dry before draping.  Prepped by: Taylor Gaston, RT 6/29/2023 9:36 AM.

## 2023-06-29 NOTE — OP NOTE
Date: 06/29/2023     Surgeon(s) and Role:   Dipak Lim MD    Assistant: None    Pre-op Diagnosis:   1. T82.858A: Stenosis of vascular prosthetic devices, implants and grafts, initial encounter  2.   Patient Active Problem List    Diagnosis Date Noted    Bilateral posterior capsular opacification 05/02/2023    Pseudophakia 01/06/2022    History of diabetic ulcer of foot     Elevated PSA 2/18/21 prostate bx normal 02/18/2021    Hematuria 01/10/2021    Pulmonary nodule 1/2021 4 mm nodule. 01/06/2021    Pancytopenia 01/04/2021    Elevated liver enzymes 01/04/2021    Chronic deep vein thrombosis (DVT) of popliteal vein of right lower extremity 9/21/20 outside [place]; unprovoked; anticoagulation 09/21/2020    History of fungal infection candida parasilosis hospitalization 8/2020 08/29/2020    Anemia in chronic kidney disease 08/26/2020    Nephrotic syndrome 08/16/2020    Anasarca 08/16/2020    Hypocalcemia 08/16/2020    Type 2 diabetes mellitus with diabetic neuropathy, with long-term current use of insulin 05/10/2018    History of stroke 12/04/2017    CME (cystoid macular edema), bilateral 11/16/2017    Refractive error 11/16/2017    Senile nuclear sclerosis 10/05/2017    Right sided weakness 09/11/2017    Secondary hyperparathyroidism of renal origin 08/03/2017    Vitamin D deficiency 08/03/2017    Nuclear sclerosis, left 06/09/2017    Cortical cataract of both eyes 06/09/2017    DM type 2 without retinopathy 06/09/2017    Microcytosis 9/2019 labs c/w AoCD and AoCKD 03/22/2017    ESRD (end stage renal disease) 03/22/2017    Benign essential HTN 03/21/2017    Pure hypercholesterolemia 03/21/2017    Controlled type 2 diabetes mellitus with chronic kidney disease on chronic dialysis, with long-term current use of insulin 03/21/2017    BPH (benign prostatic hyperplasia) 2/18/21 prostate bx normal 03/21/2017    Seizure disorder as sequela of cerebrovascular accident 03/21/2017    Hemiplegia and hemiparesis following  cerebral infarction affecting right dominant side 03/21/2017          Post-op Diagnosis: Same     Procedure(s):   1. US guided L radial artery percutaneous access  2. LUE fistulogram  3. Central venogram  4. Moderate sedation  5. Balloon angioplasty of proximal fistula with 7-9 Conquest balloons  6. Balloon angioplasty of cephalic arch with 7-8 mm Conquest balloons    Anesthesia: Local MAC     Findings/Key Components:   Successful treatment of > 70% stenosis  Strong palpable thrill     EBL: Minimal    PROCEDURE IN DETAIL:After an informed consent was obtained the patient was taken to the room and placed in the supine position.  Arm was prepped and draped in the standard surgical fashion. Under ultrasound guidance, the L radial artery was accessed with a micropuncture needle; ultrasound confirmed vessel patency, followed by placement of 4/3-Azerbaijani micropuncture dilator. Through this, an 0.035-inch wire was placed in the short 6-Azerbaijani sheath.   A fistulogram and central venogram was performed.  After independent review and interpretation, based upon the fistulogram results, I decided to intervene. Through this, an 0.035-inch Glidewire was placed through the high-grade stenosis, which was demonstrated by the angiogram.  A high-grade stenosis in the proximal AVF and cephalic arch was found, which was crossed with a hyrophilic 0.035-in glidewire. This was treated with the high-pressure, noncompliant balloon(s) listed above. Resolution of the stenosis was noted. Strong thrill could be felt. The sheath was removed, a vasc band was placed with good hemostasis.    MODERATE SEDATION   I was present for and monitored the patients cardio respiratory functions during the moderate sedation. See nurses notes for Intra-service start and end times, the medications their doseage and route.    Time of sedation:  45 mins.

## 2023-06-29 NOTE — DISCHARGE INSTRUCTIONS
"DIET: You may resume your home diet. If nausea is present, increase your diet gradually with fluids and bland foods    ACTIVITY LEVEL: You have received sedation or an anesthetic, you may feel sleepy for several hours. Rest until you are more awake.     Limit movement of wrist for the next 24 hours.  No lifting over 5 pounds for the next 24 hours.     If Oozing occurs at the injection site, lie down. Apply pressure with a clean wash cloth for 20-30 minutes. Call the doctor.     If severe bleeding occurs, lie down, apply pressure. Call 911    DRESSING: Remove dressing in 24 hours, and you may shower. Clean area with soap and water, apply band aid for the next 5 days.       Medications: Pain medication should be taken only if needed and as directed. If antibiotics are prescribed, the medication should be taken until completed. You will be given an updated list of you medications.    No driving, alcoholic beverages or signing legal documents for next 24 hours or while taking pain medication.       CALL 911  Chest pain   Shortness of breath           CALL THE DOCTOR:   Fever of 100.4 degrees Fahrenheit or higher, or as directed by your healthcare provider.  Red, hot, or swollen area around the site.   Loss of pulsing feeling ("thrill") over the fistula  Pain, cold, or numbness in the hand          If any unusual problems or difficulties occur contact your doctor. If you cannot contact your doctor but feel your signs and symptoms warrant a physicians attention return to the emergency room.    Fall Prevention  Millions of people fall every year and injure themselves. You may have had anesthesia or sedation which may increase your risk of falling. You may have health issues that put you at an increased risk of falling.     Here are ways to reduce your risk of falling.    Make your home safe by keeping walkways clear of objects you may trip over.  Use non-slip pads under rugs. Do not use area rugs or small throw rugs.  Use " non-slip mats in bathtubs and showers.  Install handrails and lights on staircases.  Do not walk in poorly lit areas.  Do not stand on chairs or wobbly ladders.  Use caution when reaching overhead or looking upward. This position can cause a loss of balance.  Be sure your shoes fit properly, have non-slip bottoms and are in good condition.   Wear shoes both inside and out. Avoid going barefoot or wearing slippers.  Be cautious when going up and down stairs, curbs, and when walking on uneven sidewalks.  If your balance is poor, consider using a cane or walker.  If your fall was related to alcohol use, stop or limit alcohol intake.   If your fall was related to use of sleeping medicines, talk to your doctor about this. You may need to reduce your dosage at bedtime if you awaken during the night to go to the bathroom.    To reduce the need for nighttime bathroom trips:  Avoid drinking fluids for several hours before going to bed  Empty your bladder before going to bed  Men can keep a urinal at the bedside  Stay as active as you can. Balance, flexibility, strength, and endurance all come from exercise. They all play a role in preventing falls. Ask your healthcare provider which types of activity are right for you.  Get your vision checked on a regular basis.  If you have pets, know where they are before you stand up or walk so you don't trip over them.  Use night lights.

## 2023-08-01 ENCOUNTER — OFFICE VISIT (OUTPATIENT)
Dept: UROLOGY | Facility: CLINIC | Age: 68
End: 2023-08-01
Payer: MEDICARE

## 2023-08-01 VITALS — WEIGHT: 180.75 LBS | BODY MASS INDEX: 27.49 KG/M2

## 2023-08-01 DIAGNOSIS — N13.8 BPH WITH OBSTRUCTION/LOWER URINARY TRACT SYMPTOMS: Primary | ICD-10-CM

## 2023-08-01 DIAGNOSIS — N40.1 BPH WITH OBSTRUCTION/LOWER URINARY TRACT SYMPTOMS: Primary | ICD-10-CM

## 2023-08-01 DIAGNOSIS — R97.20 ELEVATED PSA: ICD-10-CM

## 2023-08-01 DIAGNOSIS — R35.1 NOCTURIA MORE THAN TWICE PER NIGHT: ICD-10-CM

## 2023-08-01 DIAGNOSIS — N40.0 BENIGN NON-NODULAR PROSTATIC HYPERPLASIA WITHOUT LOWER URINARY TRACT SYMPTOMS: Chronic | ICD-10-CM

## 2023-08-01 DIAGNOSIS — R39.89 SUSPECTED UTI: ICD-10-CM

## 2023-08-01 PROCEDURE — 1101F PR PT FALLS ASSESS DOC 0-1 FALLS W/OUT INJ PAST YR: ICD-10-PCS | Mod: HCNC,CPTII,S$GLB, | Performed by: UROLOGY

## 2023-08-01 PROCEDURE — 3288F PR FALLS RISK ASSESSMENT DOCUMENTED: ICD-10-PCS | Mod: HCNC,CPTII,S$GLB, | Performed by: UROLOGY

## 2023-08-01 PROCEDURE — 3008F PR BODY MASS INDEX (BMI) DOCUMENTED: ICD-10-PCS | Mod: HCNC,CPTII,S$GLB, | Performed by: UROLOGY

## 2023-08-01 PROCEDURE — 1160F RVW MEDS BY RX/DR IN RCRD: CPT | Mod: HCNC,CPTII,S$GLB, | Performed by: UROLOGY

## 2023-08-01 PROCEDURE — 1159F PR MEDICATION LIST DOCUMENTED IN MEDICAL RECORD: ICD-10-PCS | Mod: HCNC,CPTII,S$GLB, | Performed by: UROLOGY

## 2023-08-01 PROCEDURE — 3288F FALL RISK ASSESSMENT DOCD: CPT | Mod: HCNC,CPTII,S$GLB, | Performed by: UROLOGY

## 2023-08-01 PROCEDURE — 99213 OFFICE O/P EST LOW 20 MIN: CPT | Mod: HCNC,S$GLB,, | Performed by: UROLOGY

## 2023-08-01 PROCEDURE — 3044F PR MOST RECENT HEMOGLOBIN A1C LEVEL <7.0%: ICD-10-PCS | Mod: HCNC,CPTII,S$GLB, | Performed by: UROLOGY

## 2023-08-01 PROCEDURE — 1101F PT FALLS ASSESS-DOCD LE1/YR: CPT | Mod: HCNC,CPTII,S$GLB, | Performed by: UROLOGY

## 2023-08-01 PROCEDURE — 1160F PR REVIEW ALL MEDS BY PRESCRIBER/CLIN PHARMACIST DOCUMENTED: ICD-10-PCS | Mod: HCNC,CPTII,S$GLB, | Performed by: UROLOGY

## 2023-08-01 PROCEDURE — 1126F PR PAIN SEVERITY QUANTIFIED, NO PAIN PRESENT: ICD-10-PCS | Mod: HCNC,CPTII,S$GLB, | Performed by: UROLOGY

## 2023-08-01 PROCEDURE — 3044F HG A1C LEVEL LT 7.0%: CPT | Mod: HCNC,CPTII,S$GLB, | Performed by: UROLOGY

## 2023-08-01 PROCEDURE — 99999 PR PBB SHADOW E&M-EST. PATIENT-LVL III: ICD-10-PCS | Mod: PBBFAC,HCNC,, | Performed by: UROLOGY

## 2023-08-01 PROCEDURE — 3008F BODY MASS INDEX DOCD: CPT | Mod: HCNC,CPTII,S$GLB, | Performed by: UROLOGY

## 2023-08-01 PROCEDURE — 99999 PR PBB SHADOW E&M-EST. PATIENT-LVL III: CPT | Mod: PBBFAC,HCNC,, | Performed by: UROLOGY

## 2023-08-01 PROCEDURE — 99213 PR OFFICE/OUTPT VISIT, EST, LEVL III, 20-29 MIN: ICD-10-PCS | Mod: HCNC,S$GLB,, | Performed by: UROLOGY

## 2023-08-01 PROCEDURE — 1126F AMNT PAIN NOTED NONE PRSNT: CPT | Mod: HCNC,CPTII,S$GLB, | Performed by: UROLOGY

## 2023-08-01 PROCEDURE — 1159F MED LIST DOCD IN RCRD: CPT | Mod: HCNC,CPTII,S$GLB, | Performed by: UROLOGY

## 2023-08-01 RX ORDER — FINASTERIDE 5 MG/1
5 TABLET, FILM COATED ORAL DAILY
Qty: 90 TABLET | Refills: 3 | Status: SHIPPED | OUTPATIENT
Start: 2023-08-01 | End: 2024-02-27 | Stop reason: SDUPTHER

## 2023-08-01 NOTE — PROGRESS NOTES
Subjective:       Patient ID: Miguel Moore is a 67 y.o. male The patient's last visit with me was on 5/2/2023.     Chief Complaint:   Chief Complaint   Patient presents with    Follow-up     Hematuria  Patient complains of gross hematuria. Onset of hematuria was a few months ago and was gradual in onset. There is not a history of nephrolithiasis. There is not a history of urologic trauma. Other urologic symptoms include slow stream, hesitancy, intermittency, nocturia. Patient admits to history of no risk factors for cancer. Patient denies history of Agent Orange exposure, chronic Nunez catheter,  surgeries, occupational exposure, sexually transmitted diseases, tobacco use, trauma and urolithiasis. Prior workup has been none.  He had a CT in January when he was hospitalized with renal failure.    He had a Cystoscopy with simple UDS in 2017 by Dr. Chambers.  This showed an enlarged prostate and incomplete bladder emptying.    He is now on MWF dialysis.    9/15/2022  He had a UTI in August 2022.  He had a negative prostate biopsy in February 2021 for a PSA 10.2.  He thinks he has a UTI due to a strong smell and dark color.     5/2/2023  He has noted some straining for the past week.  The odor is off.    08/01/2023  He was given antibiotics last visit.  He is improved after antibiotics.  He has noted the odor is returning.        ACTIVE MEDICAL ISSUES:  Patient Active Problem List   Diagnosis    Benign essential HTN    Pure hypercholesterolemia    Controlled type 2 diabetes mellitus with chronic kidney disease on chronic dialysis, with long-term current use of insulin    BPH (benign prostatic hyperplasia) 2/18/21 prostate bx normal    Seizure disorder as sequela of cerebrovascular accident    Hemiplegia and hemiparesis following cerebral infarction affecting right dominant side    Microcytosis 9/2019 labs c/w AoCD and AoCKD    ESRD (end stage renal disease)    Nuclear sclerosis, left    Cortical cataract of both eyes     DM type 2 without retinopathy    Secondary hyperparathyroidism of renal origin    Vitamin D deficiency    Right sided weakness    Senile nuclear sclerosis    CME (cystoid macular edema), bilateral    Refractive error    History of stroke    Type 2 diabetes mellitus with diabetic neuropathy, with long-term current use of insulin    Nephrotic syndrome    Anasarca    Hypocalcemia    History of fungal infection candida parasilosis hospitalization 8/2020    Chronic deep vein thrombosis (DVT) of popliteal vein of right lower extremity 9/[place]/20 outside [place]; unprovoked; anticoagulation    Pancytopenia    Elevated liver enzymes    Pulmonary nodule 1/2021 4 mm nodule.    Hematuria    Elevated PSA 2/18/21 prostate bx normal    Anemia in chronic kidney disease    History of diabetic ulcer of foot    Pseudophakia    Bilateral posterior capsular opacification       ALLERGIES AND MEDICATIONS: updated and reviewed.  Review of patient's allergies indicates:   Allergen Reactions    Ace inhibitors      Hyperkalemia 8/2018  Other reaction(s): Unknown    Penicillins Hives    Tizanidine      Felt hot     Current Outpatient Medications   Medication Sig    amLODIPine (NORVASC) 10 MG tablet Take 1 tablet (10 mg total) by mouth once daily.    atorvastatin (LIPITOR) 80 MG tablet Take 1 tablet (80 mg total) by mouth once daily.    blood sugar diagnostic Strp To check BG 1 times daily, to use with insurance preferred meter    blood-glucose meter kit To check BG 1 times daily, to use with insurance preferred meter    CALCITRIOL ORAL Take 1.25 mcg by mouth 3 (three) times a week.    cholecalciferol, vitamin D3, (VITAMIN D3) 25 mcg (1,000 unit) capsule Take 1 capsule (1,000 Units total) by mouth once daily.    clopidogreL (PLAVIX) 75 mg tablet Take 1 tablet (75 mg total) by mouth once daily.    clotrimazole-betamethasone 1-0.05% (LOTRISONE) cream Apply topically 2 (two) times daily.    ELIQUIS 5 mg Tab Take 1 tablet by mouth twice daily     hydrocortisone (WESTCORT) 0.2 % cream Apply topically 2 (two) times daily.    labetaloL (NORMODYNE) 100 MG tablet Take 1 tablet (100 mg total) by mouth once daily at 6am.    lancets Misc To check BG 1 times daily, to use with insurance preferred meter    levETIRAcetam (KEPPRA) 250 MG Tab Take 1 tablet (250 mg total) by mouth 2 (two) times daily on Tuesday, Thursday, Saturday & Sunday. Take 2 tablets (500mg total) by mouth in the morning and 1 tablet (250mg total) in the evening on dialysis days (Monday, Wednesday, & Friday)    RENVELA 800 mg Tab Take 1 tablet (800 mg total) by mouth 3 (three) times daily with meals.    sodium chloride 0.9% SolP 100 mL with iron sucrose 100 mg iron/5 mL Soln 100 mg 50 mg.    tamsulosin (FLOMAX) 0.4 mg Cap Take 2 capsules by mouth once daily    finasteride (PROSCAR) 5 mg tablet Take 1 tablet (5 mg total) by mouth once daily.     No current facility-administered medications for this visit.       Review of Systems   Constitutional:  Negative for activity change, fatigue, fever and unexpected weight change.   HENT:  Negative for congestion.    Eyes:  Negative for redness.   Respiratory:  Negative for chest tightness and shortness of breath.    Cardiovascular:  Negative for chest pain and leg swelling.   Gastrointestinal:  Negative for abdominal pain, constipation, diarrhea, nausea and vomiting.   Genitourinary:  Negative for dysuria, flank pain, frequency, hematuria, penile pain, penile swelling, scrotal swelling, testicular pain and urgency.   Musculoskeletal:  Negative for arthralgias and back pain.   Neurological:  Negative for dizziness and light-headedness.   Psychiatric/Behavioral:  Negative for behavioral problems and confusion. The patient is not nervous/anxious.    All other systems reviewed and are negative.      Objective:      Vitals:    08/01/23 1553   Weight: 82 kg (180 lb 12.4 oz)       Physical Exam  Vitals and nursing note reviewed.   Constitutional:       Appearance: He  is well-developed.   HENT:      Head: Normocephalic.   Eyes:      Conjunctiva/sclera: Conjunctivae normal.   Neck:      Thyroid: No thyromegaly.      Trachea: No tracheal deviation.   Cardiovascular:      Rate and Rhythm: Normal rate.      Heart sounds: Normal heart sounds.   Pulmonary:      Effort: Pulmonary effort is normal. No respiratory distress.      Breath sounds: Normal breath sounds. No wheezing.   Abdominal:      General: Bowel sounds are normal.      Palpations: Abdomen is soft.      Tenderness: There is no abdominal tenderness. There is no rebound.      Hernia: No hernia is present.   Musculoskeletal:         General: No tenderness. Normal range of motion.      Cervical back: Normal range of motion and neck supple.   Lymphadenopathy:      Cervical: No cervical adenopathy.   Skin:     General: Skin is warm and dry.      Findings: No erythema or rash.   Neurological:      Mental Status: He is alert and oriented to person, place, and time.   Psychiatric:         Behavior: Behavior normal.         Thought Content: Thought content normal.         Judgment: Judgment normal.         Urine dipstick shows not done.  Micro exam: not done.     US Retroperitoneal Complete (Kidney and  Order: [id number]  Status: Final result     Visible to patient: No (inaccessible in Patient Portal)     Next appt: Today at 02:45 PM in Urology (Lee Styles MD)     Dx: BPH with obstruction/lower urinary tr...     0 Result Notes  Details    Reading Physician Reading Date Result Priority   Tien Harrison III, MD  [phone]  [phone] 10/7/2022 Routine     Narrative & Impression  EXAMINATION:  US RETROPERITONEAL COMPLETE     CLINICAL HISTORY:  Benign prostatic hyperplasia with lower urinary tract symptoms     FINDINGS:  The right kidney measures 7 cm, the left kidney 6.7.  The right resistive index is 0.72, the left 0.82.  Kidneys demonstrate no mass, scar, stone, or hydronephrosis.  The bladder is partially collapsed.   Bladder volume is 13 mL.  Prostate volume is 97.4 mL.     Impression:     No significant abnormality seen.        Electronically signed by: Tien Harrison MD  Date:                                            10/07/2022  Time:                                           08:00           Exam Ended: 10/06/22 17:36             Assessment:       1. BPH with obstruction/lower urinary tract symptoms    2. Elevated PSA    3. Nocturia more than twice per night    4. Suspected UTI    5. Benign non-nodular prostatic hyperplasia without lower urinary tract symptoms              Plan:       1. BPH with obstruction/lower urinary tract symptoms  Flomax  Proscar    2. Elevated PSA  Check again  - Prostate Specific Antigen, Diagnostic; Future    3. Nocturia more than twice per night      4. Suspected UTI    - Urine culture; Future             Follow up in about 4 weeks (around 8/29/2023) for Follow up Established, Review X-ray.

## 2023-08-03 ENCOUNTER — LAB VISIT (OUTPATIENT)
Dept: LAB | Facility: HOSPITAL | Age: 68
End: 2023-08-03
Attending: UROLOGY
Payer: MEDICARE

## 2023-08-03 DIAGNOSIS — R39.89 SUSPECTED UTI: ICD-10-CM

## 2023-08-03 PROCEDURE — 87086 URINE CULTURE/COLONY COUNT: CPT | Mod: HCNC | Performed by: UROLOGY

## 2023-08-03 PROCEDURE — 87186 SC STD MICRODIL/AGAR DIL: CPT | Mod: 59,HCNC | Performed by: UROLOGY

## 2023-08-03 PROCEDURE — 87077 CULTURE AEROBIC IDENTIFY: CPT | Mod: 59,HCNC | Performed by: UROLOGY

## 2023-08-03 PROCEDURE — 87088 URINE BACTERIA CULTURE: CPT | Mod: HCNC | Performed by: UROLOGY

## 2023-08-05 LAB
BACTERIA UR CULT: ABNORMAL
BACTERIA UR CULT: ABNORMAL

## 2023-08-07 ENCOUNTER — TELEPHONE (OUTPATIENT)
Dept: UROLOGY | Facility: CLINIC | Age: 68
End: 2023-08-07
Payer: MEDICARE

## 2023-08-07 DIAGNOSIS — R39.89 SUSPECTED UTI: Primary | ICD-10-CM

## 2023-08-07 RX ORDER — CEFDINIR 300 MG/1
300 CAPSULE ORAL 2 TIMES DAILY
Qty: 20 CAPSULE | Refills: 0 | Status: SHIPPED | OUTPATIENT
Start: 2023-08-07 | End: 2023-08-17

## 2023-08-07 NOTE — TELEPHONE ENCOUNTER
Pt was contacted. Pt was informed of results and medication sent over to pharmacy.  ----- Message from Lee Styles MD sent at 8/7/2023  9:51 AM CDT -----  Omnicef sent to the pharmacy

## 2023-08-15 ENCOUNTER — OFFICE VISIT (OUTPATIENT)
Dept: PODIATRY | Facility: CLINIC | Age: 68
End: 2023-08-15
Payer: MEDICARE

## 2023-08-15 VITALS — HEIGHT: 68 IN | BODY MASS INDEX: 27.39 KG/M2 | WEIGHT: 180.75 LBS

## 2023-08-15 DIAGNOSIS — Z79.4 CONTROLLED TYPE 2 DIABETES MELLITUS WITH CHRONIC KIDNEY DISEASE ON CHRONIC DIALYSIS, WITH LONG-TERM CURRENT USE OF INSULIN: Primary | ICD-10-CM

## 2023-08-15 DIAGNOSIS — E11.22 CONTROLLED TYPE 2 DIABETES MELLITUS WITH CHRONIC KIDNEY DISEASE ON CHRONIC DIALYSIS, WITH LONG-TERM CURRENT USE OF INSULIN: Primary | ICD-10-CM

## 2023-08-15 DIAGNOSIS — M20.40 HAMMER TOE, UNSPECIFIED LATERALITY: ICD-10-CM

## 2023-08-15 DIAGNOSIS — Z99.2 CONTROLLED TYPE 2 DIABETES MELLITUS WITH CHRONIC KIDNEY DISEASE ON CHRONIC DIALYSIS, WITH LONG-TERM CURRENT USE OF INSULIN: Primary | ICD-10-CM

## 2023-08-15 DIAGNOSIS — N18.6 CONTROLLED TYPE 2 DIABETES MELLITUS WITH CHRONIC KIDNEY DISEASE ON CHRONIC DIALYSIS, WITH LONG-TERM CURRENT USE OF INSULIN: Primary | ICD-10-CM

## 2023-08-15 DIAGNOSIS — B35.1 ONYCHOMYCOSIS DUE TO DERMATOPHYTE: ICD-10-CM

## 2023-08-15 PROCEDURE — 99499 NO LOS: ICD-10-PCS | Mod: HCNC,S$GLB,, | Performed by: PODIATRIST

## 2023-08-15 PROCEDURE — 99999 PR PBB SHADOW E&M-EST. PATIENT-LVL III: CPT | Mod: PBBFAC,HCNC,, | Performed by: PODIATRIST

## 2023-08-15 PROCEDURE — 11055 PR TRIM HYPERKERATOTIC SKIN LESION, ONE: ICD-10-PCS | Mod: Q9,HCNC,S$GLB, | Performed by: PODIATRIST

## 2023-08-15 PROCEDURE — 99999 PR PBB SHADOW E&M-EST. PATIENT-LVL III: ICD-10-PCS | Mod: PBBFAC,HCNC,, | Performed by: PODIATRIST

## 2023-08-15 PROCEDURE — 11721 PR DEBRIDEMENT OF NAILS, 6 OR MORE: ICD-10-PCS | Mod: 59,Q9,HCNC,S$GLB | Performed by: PODIATRIST

## 2023-08-15 PROCEDURE — 11055 PARING/CUTG B9 HYPRKER LES 1: CPT | Mod: Q9,HCNC,S$GLB, | Performed by: PODIATRIST

## 2023-08-15 PROCEDURE — 11721 DEBRIDE NAIL 6 OR MORE: CPT | Mod: 59,Q9,HCNC,S$GLB | Performed by: PODIATRIST

## 2023-08-15 PROCEDURE — 99499 UNLISTED E&M SERVICE: CPT | Mod: HCNC,S$GLB,, | Performed by: PODIATRIST

## 2023-08-16 NOTE — PROGRESS NOTES
Subjective:     Patient ID: Miguel Moore is a 68 y.o. male.    Chief Complaint: Diabetes Mellitus (Upcoming 10/26/23 Dr Raymond) and Nail Care    Miguel is a 68 y.o. male who presents to the clinic for evaluation and treatment of high risk feet. Miguel has a past medical history of Allergy, Clotting disorder, Deep vein thrombosis, Diabetes mellitus, type 2, Hypertension, Nuclear sclerosis of both eyes (6/9/2017), Renal disorder, Seizures, Stroke, and Urinary tract infection. The patient's chief complaint is long, thick toenails. This patient has documented high risk feet requiring routine maintenance secondary to peripheral neuropathy.    PCP: Raciel Raymond MD    Date Last Seen by PCP: per above    Current shoe gear:  Affected Foot: Tennis shoes     Unaffected Foot: Tennis shoes    Hemoglobin A1C   Date Value Ref Range Status   04/27/2023 5.9 (H) 4.0 - 5.6 % Final     Comment:     ADA Screening Guidelines:  5.7-6.4%  Consistent with prediabetes  >or=6.5%  Consistent with diabetes    High levels of fetal hemoglobin interfere with the HbA1C  assay. Heterozygous hemoglobin variants (HbS, HgC, etc)do  not significantly interfere with this assay.   However, presence of multiple variants may affect accuracy.     11/02/2021 5.9 (H) 4.0 - 5.6 % Final     Comment:     ADA Screening Guidelines:  5.7-6.4%  Consistent with prediabetes  >or=6.5%  Consistent with diabetes    High levels of fetal hemoglobin interfere with the HbA1C  assay. Heterozygous hemoglobin variants (HbS, HgC, etc)do  not significantly interfere with this assay.   However, presence of multiple variants may affect accuracy.     08/13/2020 5.5 4.0 - 5.6 % Final     Comment:     ADA Screening Guidelines:  5.7-6.4%  Consistent with prediabetes  >or=6.5%  Consistent with diabetes  High levels of fetal hemoglobin interfere with the HbA1C  assay. Heterozygous hemoglobin variants (HbS, HgC, etc)do  not significantly interfere with this assay.   However,  presence of multiple variants may affect accuracy.         Review of Systems   Constitutional: Negative for chills.   Cardiovascular:  Negative for chest pain and claudication.   Respiratory:  Negative for cough.    Skin:  Positive for color change, dry skin and nail changes.   Musculoskeletal:  Positive for joint pain.   Gastrointestinal:  Negative for nausea.   Neurological:  Positive for paresthesias. Negative for numbness.   Psychiatric/Behavioral:  The patient is not nervous/anxious.         Objective:     Physical Exam  Constitutional:       Appearance: He is well-developed.   Cardiovascular:      Comments: Dorsalis pedis and posterior tibial pulses are diminished Bilaterally. Toes are cool to touch. Feet are warm proximally.There is decreased digital hair. Skin is atrophic, slightly hyperpigmented, and mildly edematous   Pulmonary:      Effort: No respiratory distress.   Musculoskeletal:         General: Tenderness present.      Comments: Adequate joint range of motion without pain, limitation, nor crepitation Bilateral feet and ankle joints. Muscle strength is 5/5 in all groups bilaterally.   Decreased stride, station of gait.  apropulsive toe off.  Increased angle and base of gait.      Patient has hammertoes of digits 2-5 bilateral partially reducible without symptom today.     Visible and palpable bunion without pain at dorsomedial 1st metatarsal head right and left.  Hallux abducted right and left partially reducible, tracks laterally without being track bound.  No ecchymosis, erythema, edema, or cardinal signs infection or signs of trauma same foot.         Feet:      Right foot:      Skin integrity: Callus and dry skin present. No ulcer or skin breakdown.      Left foot:      Skin integrity: Dry skin present. No ulcer.   Skin:     General: Skin is dry.      Findings: No erythema.      Comments: Nails x10 are elongated by 2-6mm's, thickened by 3-5 mm's, dystrophic, and are darkened in coloration .  Xerosis Bilaterally. No open lesions noted   Focal hyperkeratotic lesion consisting entirely of hyperkeratotic tissue without open skin, drainage, pus, fluctuance, malodor, or signs of infection: right heel      Neurological:      Mental Status: He is alert.      Comments: [place]-Alex 5.07 monofilamant testing is diminished Desmond feet. Sharp/dull sensation diminished Bilaterally. Light touch absent Bilaterally.          Assessment:      Encounter Diagnoses   Name Primary?    Controlled type 2 diabetes mellitus with chronic kidney disease on chronic dialysis, with long-term current use of insulin Yes    Onychomycosis due to dermatophyte      Plan:     Miguel was seen today for diabetes mellitus and nail care.    Diagnoses and all orders for this visit:    Controlled type 2 diabetes mellitus with chronic kidney disease on chronic dialysis, with long-term current use of insulin  -     DIABETIC SHOES FOR HOME USE    Onychomycosis due to dermatophyte  -     DIABETIC SHOES FOR HOME USE      I counseled the patient on his conditions, their implications and medical management.    - Shoe inspection. Diabetic Foot Education. Patient reminded of the importance of good nutrition and blood sugar control to help prevent podiatric complications of diabetes. Patient instructed on proper foot hygeine. We discussed wearing proper shoe gear, daily foot inspections, never walking without protective shoe gear, never putting sharp instruments to feet, routine podiatric nail visits every 2-3 months.   - With patient's permission, nails were aggressively reduced and debrided x 10 to their soft tissue attachment mechanically and with electric , removing all offending nail and debris. Patient relates relief following the procedure. He will continue to monitor the areas daily, inspect his feet, wear protective shoe gear when ambulatory, moisturizer to maintain skin integrity and follow in this office in approximately 2-3 months,  sooner p.r.n.   - After cleansing the area w/ alcohol prep pad the above mentioned hyperkeratosis was trimmed utilizing No 15 scapel, to a smooth base with out incident. Right heel

## 2023-08-22 DIAGNOSIS — I10 BENIGN ESSENTIAL HTN: Chronic | ICD-10-CM

## 2023-08-22 RX ORDER — AMLODIPINE BESYLATE 10 MG/1
TABLET ORAL
Qty: 90 TABLET | Refills: 0 | Status: SHIPPED | OUTPATIENT
Start: 2023-08-22 | End: 2023-10-22

## 2023-08-22 NOTE — TELEPHONE ENCOUNTER
No care due was identified.  [facility] Embedded Care Due Messages. Reference number: [id number].   8/22/2023 10:54:02 AM CDT

## 2023-08-22 NOTE — TELEPHONE ENCOUNTER
Refill Decision Note   Miguel Moore  is requesting a refill authorization.  Brief Assessment and Rationale for Refill:  Approve     Medication Therapy Plan:         Comments:     Note composed:11:20 AM 08/22/2023             Appointments     Last Visit   8/18/2022 Raciel Raymond MD   Next Visit   10/26/2023 Raciel Raymond MD

## 2023-08-28 DIAGNOSIS — I82.431 ACUTE DEEP VEIN THROMBOSIS (DVT) OF POPLITEAL VEIN OF RIGHT LOWER EXTREMITY: ICD-10-CM

## 2023-08-28 NOTE — TELEPHONE ENCOUNTER
----- Message from Naveed Mark sent at 8/28/2023  1:42 PM CDT -----  Regarding: Self  [phone]  Type: RX Refill Request    Who Called:  Self     Have you contacted your pharmacy: no     Refill    RX Name and Strength: ELIQUIS 5 mg Tab    Preferred Pharmacy with phone number:   [facility]  [place] - [address]  [address] [place] [address]  Phone: [phone] Fax: [phone]    Local or Mail Order: local     Would the patient rather a call back or a response via My [facility]? Call back     Best Call Back Number: [phone]     Additional Information:     Thank you.

## 2023-10-05 DIAGNOSIS — N40.0 BENIGN NON-NODULAR PROSTATIC HYPERPLASIA WITHOUT LOWER URINARY TRACT SYMPTOMS: Chronic | ICD-10-CM

## 2023-10-05 DIAGNOSIS — I10 BENIGN ESSENTIAL HTN: Chronic | ICD-10-CM

## 2023-10-05 RX ORDER — LABETALOL 100 MG/1
100 TABLET, FILM COATED ORAL
Qty: 90 TABLET | Refills: 0 | Status: SHIPPED | OUTPATIENT
Start: 2023-10-05 | End: 2023-10-26 | Stop reason: SDUPTHER

## 2023-10-05 RX ORDER — TAMSULOSIN HYDROCHLORIDE 0.4 MG/1
CAPSULE ORAL
Qty: 180 CAPSULE | Refills: 0 | Status: SHIPPED | OUTPATIENT
Start: 2023-10-05 | End: 2023-10-26 | Stop reason: SDUPTHER

## 2023-10-05 NOTE — TELEPHONE ENCOUNTER
No care due was identified.  [facility] Embedded Care Due Messages. Reference number: [id number].   10/05/2023 12:44:57 PM CDT

## 2023-10-05 NOTE — TELEPHONE ENCOUNTER
Refill Routing Note   Medication(s) are not appropriate for processing by [facility] for the following reason(s):      Drug-drug interaction    [facility] action(s):  Defer Care Due:  None identified     Medication Therapy Plan: Duplicate Therapy: labetaloL, tamsulosin    Pharmacist review requested: Yes     Appointments  past 12m or future 3m with PCP    Date Provider   Last Visit   8/18/2022 Raciel Raymond MD   Next Visit   10/26/2023 Raciel Raymond MD   ED visits in past 90 days: 0        Note composed:3:30 PM 10/05/2023

## 2023-10-05 NOTE — TELEPHONE ENCOUNTER
Refill Decision Note   Miguel Oscar  is requesting a refill authorization.  Brief Assessment and Rationale for Refill:  Approve     Medication Therapy Plan:         Pharmacist review requested: Yes   Extended chart review required: Yes   Comments:     Note composed:4:23 PM 10/05/2023

## 2023-10-06 ENCOUNTER — TELEPHONE (OUTPATIENT)
Dept: FAMILY MEDICINE | Facility: CLINIC | Age: 68
End: 2023-10-06
Payer: MEDICARE

## 2023-10-06 NOTE — TELEPHONE ENCOUNTER
----- Message from Herve Goodman sent at 10/6/2023  1:01 PM CDT -----  Regarding: Self [phone]   Type: RX Refill Request    Who Called: Self    Have you contacted your pharmacy:  yes     Refill    RX Name and Strength:tamsulosin (FLOMAX) 0.4 mg Cap, labetaloL (NORMODYNE) 100 MG tablet    Preferred Pharmacy with phone number: .  [facility] [address] [place] [address] [place] - [address] [facility]  [address] [place] [address]  Phone: [phone] Fax: [phone]         Local or Mail Order: local     Would the patient rather a call back or a response via My [facility]? Call back     Best Call Back Number: .[phone]      Additional Information:     Thank you.

## 2023-10-16 ENCOUNTER — TELEPHONE (OUTPATIENT)
Dept: FAMILY MEDICINE | Facility: CLINIC | Age: 68
End: 2023-10-16
Payer: MEDICARE

## 2023-10-16 NOTE — TELEPHONE ENCOUNTER
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 presents the emergency department with back pain  Wife is at the bedside confirmed that he has been swimming for 6 months  Patient was moving a safe yesterday from 1 2 another and felt a pop in his lower back  Patient denies any bowel or bladder incontinence  She denies fever, chills, dysuria  Patient did not take any medications prior to arrival to the emergency department as they felt that it may interact with his Suboxone  Patient denies any difficulty urinating, rash, any other history of trauma  No numbness and tingling down the lower extremities  Prior to Admission Medications   Prescriptions Last Dose Informant Patient Reported? Taking? buprenorphine-naloxone (Suboxone) 8-2 mg   No No   Sig: One or two strips sl q day  citalopram (CeleXA) 20 mg tablet   No No   Sig: Take 1 tablet (20 mg total) by mouth daily   Patient not taking: No sig reported      Facility-Administered Medications: None       History reviewed  No pertinent past medical history  Past Surgical History:   Procedure Laterality Date   • HAND TENDON SURGERY         Family History   Problem Relation Age of Onset   • Substance Abuse Mother    • Alcohol abuse Father    • Cirrhosis Father    • Cirrhosis Maternal Grandfather    • Throat cancer Family      I have reviewed and agree with the history as documented      E-Cigarette/Vaping   • E-Cigarette Use Never User      E-Cigarette/Vaping Substances   • Nicotine No    • THC No    • CBD No    • Flavoring No    • Other No    • Unknown No      Social History     Tobacco Use   • Smoking status: Current Every Day Smoker     Packs/day: 1 00     Years: 20 00     Pack years: 20 00     Types: Cigarettes   • Smokeless tobacco: Never Used   Vaping Use   • Vaping Use: Never used   Substance Use Topics   • Alcohol use: Not Currently     Comment: socially   • Drug use: Yes     Types: Heroin     Comment: last use 1100 hours 5/26/2022       Review of Systems   Constitutional: Negative  HENT: Negative  Eyes: Negative  Respiratory: Negative  Cardiovascular: Negative  Gastrointestinal: Negative  Endocrine: Negative  Genitourinary: Negative  Musculoskeletal: Positive for back pain  Allergic/Immunologic: Negative  Neurological: Negative  Hematological: Negative  Psychiatric/Behavioral: Negative  Physical Exam  Physical Exam  Vitals and nursing note reviewed  Constitutional:       Appearance: Normal appearance  He is normal weight  HENT:      Head: Normocephalic and atraumatic  Right Ear: External ear normal       Left Ear: External ear normal       Nose: Nose normal       Mouth/Throat:      Mouth: Mucous membranes are moist       Pharynx: Oropharynx is clear  Eyes:      Extraocular Movements: Extraocular movements intact  Conjunctiva/sclera: Conjunctivae normal       Pupils: Pupils are equal, round, and reactive to light  Cardiovascular:      Rate and Rhythm: Normal rate and regular rhythm  Pulses: Normal pulses  Heart sounds: Normal heart sounds  Pulmonary:      Effort: Pulmonary effort is normal       Breath sounds: Normal breath sounds  Abdominal:      General: Abdomen is flat  Bowel sounds are normal    Musculoskeletal:         General: No swelling or tenderness  Normal range of motion  Cervical back: Normal range of motion  Comments: Bilateral tissue texture changes along the bilateral quadratus lumborum muscle group  Examination of the posterior thoracic area reveals no rash  Skin:     General: Skin is warm  Capillary Refill: Capillary refill takes less than 2 seconds  Neurological:      General: No focal deficit present        Mental Status: He is alert and oriented to person, place, and time  Mental status is at baseline  Psychiatric:         Mood and Affect: Mood normal          Behavior: Behavior normal          Thought Content: Thought content normal          Judgment: Judgment normal          Vital Signs  ED Triage Vitals [10/31/22 0922]   Temperature Pulse Respirations Blood Pressure SpO2   98 6 °F (37 °C) 83 16 121/74 94 %      Temp Source Heart Rate Source Patient Position - Orthostatic VS BP Location FiO2 (%)   Oral Monitor Sitting Right arm --      Pain Score       5           Vitals:    10/31/22 0922   BP: 121/74   Pulse: 83   Patient Position - Orthostatic VS: Sitting         Visual Acuity      ED Medications  Medications   ketorolac (TORADOL) injection 30 mg (30 mg Intramuscular Given 10/31/22 1002)   dexamethasone (DECADRON) injection 8 mg (8 mg Intramuscular Given 10/31/22 1003)       Diagnostic Studies  Results Reviewed     None                 No orders to display              Procedures  Procedures         ED Course                                             MDM  Number of Diagnoses or Management Options  Acute low back pain  Diagnosis management comments: Acute muscle skeletal back pain, no bowel or bladder incontinence, no fever chills  Patient is given Toradol and Decadron for pain control at the bedside, patient will need to follow-up with [facility], ambulatory referral made on the patient's PI  Denies history of saddle anesthesia/perineal anesthesia  Denies bowel or bladder incontinence/retention   History does not suggest diagnosis of cauda equina syndrome   Patient denies history of IVDA, denies history of fevers, no recent surgeries or any procedures to suggest a transient bacteremia leading to a diagnosis of subdural abscess  Denies history of blood thinner use with recent history of lumbar puncture or any violation of the epidural space to suggest history of epidural hematoma   Therefore these above diagnoses (cauda equina syndrome, epidural abscess, epidural hematoma) were not pursued with diagnostic imaging  Amount and/or Complexity of Data Reviewed  Decide to obtain previous medical records or to obtain history from someone other than the patient: yes  Obtain history from someone other than the patient: yes (Wife at the bedside )  Review and summarize past medical records: yes  Independent visualization of images, tracings, or specimens: yes        Disposition  Final diagnoses:   Acute low back pain     Time reflects when diagnosis was documented in both MDM as applicable and the Disposition within this note     Time User Action Codes Description Comment    10/31/2022 10:17 AM Pina Easley Add [M54 50] Acute low back pain       ED Disposition     ED Disposition   Discharge    Condition   Stable    Date/Time   Mon Oct 31, 2022 10:17 AM    Comment   Dev Diaz discharge to home/self care                 Follow-up Information     Follow up With Specialties Details Why Contact Info Additional Yung Eugene, [facility]   [address] [place] [id number] [place] [address] [facility]   [phone]          Patient's Medications   Discharge Prescriptions    No medications on file           PDMP Review       Value Time User    PDMP Reviewed  Yes 5/27/2022 12:34 PM Noel Russell DO          ED Provider  Electronically Signed by           Roseanne Vargas III, DO  10/31/22 1036

## 2023-10-16 NOTE — TELEPHONE ENCOUNTER
----- Message from Saranya Pavon sent at 10/16/2023 10:58 AM CDT -----  Regarding: Refill request  .Type: RX Refill Request    Who Called:self     Have you contacted your pharmacy:yes     Refill or New Rx: Refill    RX Name and Strength:reny medication is to break down the foods that he eats(does not know name of medication)    Preferred Pharmacy with phone number:[facility] - [place], [place] - [address] [facility]  [address]  [place] [address]  Phone: [phone] Fax: [phone]    Local or Mail Order:local     Ordering Provider:DENA Raymond     Would the patient rather a call back or a response via My [facility]? Call     Best Call Back Number:..[phone]      Additional Information: caller states for office to call him so they can figure out what he needs

## 2023-10-17 DIAGNOSIS — N18.6 ESRD (END STAGE RENAL DISEASE): ICD-10-CM

## 2023-10-17 NOTE — TELEPHONE ENCOUNTER
----- Message from Rubi Rodriguez sent at 10/17/2023 10:02 AM CDT -----  Regarding: self .[phone]  .Type: RX Refill Request    Who Called:  self     Have you contacted your pharmacy: no    Refill or New Rx: refill     RX Name and Strength:  sevelamer 800 mg      Preferred Pharmacy with phone number: [facility] [address] [place] [address] [place] - [address] [facility]  [address] [place] [address]  Phone: [phone] Fax: [phone]    Local or Mail Order: local     Would the patient rather a call back or a response via My [facility]? Call back     Best Call Back Number: .[phone]

## 2023-10-18 RX ORDER — SEVELAMER CARBONATE 800 MG/1
800 TABLET, FILM COATED ORAL
Qty: 90 TABLET | Refills: 0 | OUTPATIENT
Start: 2023-10-18

## 2023-10-22 DIAGNOSIS — I10 BENIGN ESSENTIAL HTN: Chronic | ICD-10-CM

## 2023-10-22 RX ORDER — AMLODIPINE BESYLATE 10 MG/1
TABLET ORAL
Qty: 90 TABLET | Refills: 0 | Status: SHIPPED | OUTPATIENT
Start: 2023-10-22 | End: 2023-10-23 | Stop reason: SDUPTHER

## 2023-10-22 NOTE — TELEPHONE ENCOUNTER
Provider Staff:  Action required for this patient     Please see care gap opportunities below in Care Due Message.    Thanks!  [facility]     Appointments      Date Provider   Last Visit   8/18/2022 Raciel Raymond MD   Next Visit   10/26/2023 Raciel Raymond MD     Refill Decision Note   Miguel Moore  is requesting a refill authorization.  Brief Assessment and Rationale for Refill:  Approve     Medication Therapy Plan:         Comments:     Note composed:6:05 PM 10/22/2023

## 2023-10-22 NOTE — TELEPHONE ENCOUNTER
Care Due:                  Date            Visit Type   Department     Provider  --------------------------------------------------------------------------------                                [facility] -         [facility]                              PRIMARY      MED/ INTERNAL  Last Visit: 08-      CARE ([facility])   MED/ JELLYS      Raciel Raymond                              [facility]                              PRIMARY      MED/ INTERNAL  Next Visit: 10-      CARE ([facility])   MED/ PEDS      Raciel Raymond                                                            Last  Test          Frequency    Reason                     Performed    Due Date  --------------------------------------------------------------------------------    CBC.........  12 months..  clopidogreL..............  Not Found    Overdue    CMP.........  12 months..  atorvastatin.............  Not Found    Overdue    [employer] Embedded Care Due Messages. Reference number: [id number].   10/22/2023 4:53:28 PM CDT

## 2023-10-23 DIAGNOSIS — I10 BENIGN ESSENTIAL HTN: Chronic | ICD-10-CM

## 2023-10-23 RX ORDER — AMLODIPINE BESYLATE 10 MG/1
TABLET ORAL
Qty: 90 TABLET | Refills: 0 | Status: SHIPPED | OUTPATIENT
Start: 2023-10-23 | End: 2023-10-26 | Stop reason: SDUPTHER
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
## 2023-10-23 NOTE — TELEPHONE ENCOUNTER
No care due was identified.  [facility] Embedded Care Due Messages. Reference number: [id number].   10/23/2023 9:12:04 AM CDT

## 2023-10-25 NOTE — PROGRESS NOTES
This note was created by combination of typed  and M-Modal dictation.  Transcription errors may be present.  If there are any questions, please contact me.    Assessment and Plan:   Assessment and Plan   Controlled type 2 diabetes mellitus with chronic kidney disease on chronic dialysis, with long-term current use of insulin  Type 2 diabetes mellitus with diabetic neuropathy, with long-term current use of insulin  -diet-controlled, last A1c was good though confounder is on ESRD/HD.  Check A1c.  -     Hemoglobin A1C; Future; Expected date: 10/27/2023    History of stroke  -on statin.  On Plavix.  Change Plavix to aspirin.  Also on DOAC.  -     aspirin (ECOTRIN) 81 MG EC tablet; Take 1 tablet (81 mg total) by mouth once daily.  Dispense: 90 tablet; Refill: 3    Seizure disorder as sequela of cerebrovascular accident  -has not been taking his Keppra.  Had a seizure post stroke and does not sound like he has had 1 since.  He did see Neurology nurse practitioner last year follow-up and at the time he reportedly had temporarily restarted his Keppra but has since stopped.  Recommendations at the time were to continue the Keppra.    We talked about risks of recurrence of seizure given his history of seizure.  After discussion I will refer him to Neurology for further evaluation and defer to them regarding the need for any further tests and recommendations about continuing anti epileptic medication  -     Ambulatory referral/consult to Neurology; Future; Expected date: 11/02/2023    Hemiplegia and hemiparesis following cerebral infarction affecting right dominant side  -mobility remains an issue.  Dense hemiplegia on the right.  Has a cane that he uses to ambulate very short distances.  Also has a manual wheelchair that is deteriorating at home.  But if he walks long distances like at the mall, he is unable to walk that far and is requesting motorized wheelchair  Will refer him to PM&R for motorized wheelchair  evaluation  -     Ambulatory referral/consult to Physical Medicine Rehab; Future; Expected date: 11/02/2023      Acute deep vein thrombosis (DVT) of popliteal vein of right lower extremity on outside RLE US 9/21/2020, unprovoked; eliquis  -on chronic DOAC, no changes  -     apixaban (ELIQUIS) 5 mg Tab; Take 1 tablet (5 mg total) by mouth 2 (two) times daily.  Dispense: 180 tablet; Refill: 3    Dyslipidemia  -last lipid profile good on statin no changes refilled  -     atorvastatin (LIPITOR) 80 MG tablet; Take 1 tablet (80 mg total) by mouth once daily.  Dispense: 90 tablet; Refill: 3    Benign essential HTN; ACEI hyperK  -blood pressure today is okay.  On amlodipine, labetalol once daily.  No changes.  -     amLODIPine (NORVASC) 10 MG tablet; Take 1 tablet by mouth once daily  Dispense: 90 tablet; Refill: 3  -     labetaloL (NORMODYNE) 100 MG tablet; Take 1 tablet (100 mg total) by mouth once daily.  Dispense: 90 tablet; Refill: 3    ESRD (end stage renal disease)  Secondary hyperparathyroidism of renal origin  -ESRD/HD Monday Wednesday Friday with Dr. Currie and Dr. Joshua.  Will defer to them regarding calcium and phosphorus management with sevelamer and vitamin-D recommendations    Elevated PSA 2/18/21 prostate bx normal  Benign non-nodular prostatic hyperplasia without lower urinary tract symptoms  -followed by Urology.  Has upcoming follow-up.  Check PSA ordered by Urology  Refilled flomax  -     tamsulosin (FLOMAX) 0.4 mg Cap; Take 2 capsules (0.8 mg total) by mouth once daily.  Dispense: 180 capsule; Refill: 3    Pancytopenia  -stable.    Screening for colorectal cancer  -we talked about need for colonoscopy, he would prefer to do the fit kit.  He is now living with his partner, and maybe able to assist him with performing the fit kit as dexterity issues were an issue in the past.  Discussed that if fit  -     Fecal Immunochemical Test (iFOBT); Future; Expected date: 10/27/2023             Medications  Discontinued During This Encounter   Medication Reason    clopidogreL (PLAVIX) 75 mg tablet Alternate therapy    atorvastatin (LIPITOR) 80 MG tablet Reorder    apixaban (ELIQUIS) 5 mg Tab Reorder    tamsulosin (FLOMAX) 0.4 mg Cap Reorder    labetaloL (NORMODYNE) 100 MG tablet Reorder    amLODIPine (NORVASC) 10 MG tablet Reorder       meds sent this encounter:  Medications Ordered This Encounter   Medications    amLODIPine (NORVASC) 10 MG tablet     Sig: Take 1 tablet by mouth once daily     Dispense:  90 tablet     Refill:  3     .    apixaban (ELIQUIS) 5 mg Tab     Sig: Take 1 tablet (5 mg total) by mouth 2 (two) times daily.     Dispense:  180 tablet     Refill:  3    aspirin (ECOTRIN) 81 MG EC tablet     Sig: Take 1 tablet (81 mg total) by mouth once daily.     Dispense:  90 tablet     Refill:  3     Stop plavix    atorvastatin (LIPITOR) 80 MG tablet     Sig: Take 1 tablet (80 mg total) by mouth once daily.     Dispense:  90 tablet     Refill:  3          labetaloL (NORMODYNE) 100 MG tablet     Sig: Take 1 tablet (100 mg total) by mouth once daily.     Dispense:  90 tablet     Refill:  3     .    tamsulosin (FLOMAX) 0.4 mg Cap     Sig: Take 2 capsules (0.8 mg total) by mouth once daily.     Dispense:  180 capsule     Refill:  3         Follow Up: follow up 6 months.   Future Appointments   Date Time Provider Department Center   11/7/2023  2:00 PM Lee Styles MD [facility] [place] [facility]   11/14/2023  3:15 PM Sabi Douglas DPM [facility] [place]   12/12/2023 11:15 AM Dipak Lim MD [facility]          Subjective:   Subjective   Chief Complaint   Patient presents with    Medication Refill       HPI  Miguel is a 68 y.o. male.     Social History     Tobacco Use    Smoking status: Never    Smokeless tobacco: Never   Substance Use Topics    Alcohol use: No      Social History     Occupational History    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, etc      Social History      Social History Narrative    Lives with daughter       Last appointment with this clinic was Visit date not found. Last visit with me 8/18/2022   To summarize last visit and events leading up to today:  Diabetes diet controlled.  A1c not particularly reliable given dialysis status.  Seizures had not been taking Keppra regularly.  Had been over a year since he last took it.  I wanted him to see Neurology about need to continue.  Hypertension stable not taking hydralazine.    Hyperlipidemia/statin, restart  Ordered colonoscopy  ESRD/HD with secondary hyper PTH.  Followed by Nephrology.    Unprovoked DVT right leg 09/2020.  On DOAC.     Saw Neurology in early september.  No change.  The seizures, continue Keppra.  History of stroke continue Lipitor Eliquis on Norvasc     Gets Mircera during dialysis every 2 weeks  Calcitriol 3 times a week during dialysis  Renvela      04/2023 A1c 5.9.    Lipid profile normal     Saw Urology in May.  Suspected UTI.  Urine showing Morganella and Enterococcus.    Today's visit:  Here with his partner Tracey    Not taking keppra x > 1 year  Has seen neuro last year and rec's were to continue to take the keppra  No seizures.  Sounds like no seizures if instances initial stroke.  Discussed risks of recurrence of seizures.  I will refer him to Neurology for recommendations about antiseizure medication.    Requesting a replacement WC.  His is old and breaking down.   It's a manual WC but he is also inquiring about a motorized WC  Ambulates with a cane  But requesting motorized for longer distances like the mall.      Denies chest pain or shortness a breath.    Running low on his sevelamer.  This is managed by his nephrologists.    Reports he is taking his blood pressure medicines   And his anticoagulant  And his statin   Taking Plavix.  He is on DOAC, would change his Plavix to aspirin.    Patient Care Team:  Raciel Raymond MD as PCP - General (Internal Medicine)  Gabriella Manjarrez DPM as  Consulting Physician (Podiatry)  Mac Chambers Jr., MD as Consulting Physician (Urology)  Geovany Rucker MD as Consulting Physician (Neurology)  Bob Tay MD as Consulting Physician (Ophthalmology)  Adrian Millard MD as Consulting Physician (Nephrology)  Nikhil Martino MA as Care Coordinator    Patient Active Problem List    Diagnosis Date Noted    Bilateral posterior capsular opacification 05/02/2023    Pseudophakia 01/06/2022    History of diabetic ulcer of foot     Elevated PSA 2/18/21 prostate bx normal 02/18/2021 2/18/21 prostate bx normal      Hematuria 01/10/2021    Pulmonary nodule 1/2021 4 mm nodule. 01/06/2021 1/6/2021 CT abd/pelvis: 4 mm nodule in the right middle lobe      Pancytopenia 01/04/2021    Elevated liver enzymes 01/04/2021    Chronic deep vein thrombosis (DVT) of popliteal vein of right lower extremity 9/21/20 outside US; unprovoked; anticoagulation 09/21/2020    History of fungal infection candida parasilosis hospitalization 8/2020 08/29/2020     -Found to have candidemia - source unclear  -infectious disease consulted, Started on micafungin and now converted to fluconazole  -Repeat cultures negative so far  -Dialysis line removed 8/28.   line replaced on 08/31 after line holiday.  -end date of fluconazole will be 09/11/2020.  Patient has ophthalmology follow-up scheduled on 09/08/2020. Tentative end date 9/11/2020 If no endophthalmitis. If noted to have endophthalmitis during optho visit, would need at least 4-6 weeks of treatment      Anemia in chronic kidney disease 08/26/2020    Nephrotic syndrome 08/16/2020    Anasarca 08/16/2020    Hypocalcemia 08/16/2020    Type 2 diabetes mellitus with diabetic neuropathy, with long-term current use of insulin 05/10/2018    History of stroke 12/04/2017    CME (cystoid macular edema), bilateral 11/16/2017    Refractive error 11/16/2017    Senile nuclear sclerosis 10/05/2017    Right sided weakness 09/11/2017     Secondary hyperparathyroidism of renal origin 08/03/2017    Vitamin D deficiency 08/03/2017    Nuclear sclerosis, left 06/09/2017    Cortical cataract of both eyes 06/09/2017    DM type 2 without retinopathy 06/09/2017    Microcytosis 9/2019 labs c/w AoCD and AoCKD 03/22/2017     Seen on admission as well 2015; normal Hb, low MCV.  Normal RDW  08/17/2020 EGD normal esophagus.  Discolored nodular and texture change mucosa in the antrum.  Normal duodenum.  Path with foveolar hyperplasia and early xanthoma formation.  H pylori negative.  Mild chronic inflammation without acute activity      ESRD (end stage renal disease) 03/22/2017 2/27/20 renal US with dopplers no ALF.  Medical renal disease  8/2020 renal US: 1. Symmetric diffusely increased cortical echogenicity and elevated resistive indices, nonspecific indicators of chronic medical renal disease.  2. Prostatomegaly.  Some of the provided images are suggestive of a distinct hyperechoic component of the prostate gland, of uncertain etiology or significance, and potentially artifactual.  Dedicated prostate ultrasound or MRI may be of benefit for further characterization.    Started on HD while hospitalized for progression to ESRD 8/2020  -Continue dialysis per nephrology  -Outpatient HD has been arranged  -Had planned discharge 8/27 if remained afebrile and cultures negative.  Unfortunately his blood culture grew Candida parapsilosis  -Dr. Gomez with ID consulted and case discussed with him.  Started micafungin 8/27 and now on fluconazole.  -Dialysis line removed after HD 8/28 and plan line holiday over weekend.  -new line by IR on 8/31, tolerated dialysis fluid.  -continue outpatient dialysis.      Benign essential HTN 03/21/2017 01/06/2021 TTE mild left atrial enlargement.  LV normal size and systolic function LVEF 55%.  Indeterminate diastolic function.  Mild right atrial enlargement.  Normal RV systolic function.  Normal RV size.  PA pressure 20.   Normal CVP.  Three.      Dyslipidemia 03/21/2017    Controlled type 2 diabetes mellitus with chronic kidney disease on chronic dialysis, with long-term current use of insulin 03/21/2017    BPH (benign prostatic hyperplasia) 2/18/21 prostate bx normal 03/21/2017 6/26/2017 renal US mod prostatomegaly.      Seizure disorder as sequela of cerebrovascular accident 03/21/2017    Hemiplegia and hemiparesis following cerebral infarction affecting right dominant side 03/21/2017       PAST MEDICAL PROBLEMS, PAST SURGICAL HISTORY: please see relevant portions of the electronic medical record    ALLERGIES AND MEDICATIONS: updated and reviewed.  Medication List with Changes/Refills   Current Medications    AMLODIPINE (NORVASC) 10 MG TABLET    Take 1 tablet by mouth once daily    APIXABAN (ELIQUIS) 5 MG TAB    Take 1 tablet (5 mg total) by mouth 2 (two) times daily.    ATORVASTATIN (LIPITOR) 80 MG TABLET    Take 1 tablet (80 mg total) by mouth once daily.    BLOOD SUGAR DIAGNOSTIC STRP    To check BG 1 times daily, to use with insurance preferred meter    BLOOD-GLUCOSE METER KIT    To check BG 1 times daily, to use with insurance preferred meter    CALCITRIOL ORAL    Take 1.25 mcg by mouth 3 (three) times a week.    CHOLECALCIFEROL, VITAMIN D3, (VITAMIN D3) 25 MCG (1,000 UNIT) CAPSULE    Take 1 capsule (1,000 Units total) by mouth once daily.    CLOPIDOGREL (PLAVIX) 75 MG TABLET    Take 1 tablet (75 mg total) by mouth once daily.    CLOTRIMAZOLE-BETAMETHASONE 1-0.05% (LOTRISONE) CREAM    Apply topically 2 (two) times daily.    FINASTERIDE (PROSCAR) 5 MG TABLET    Take 1 tablet (5 mg total) by mouth once daily.    HYDROCORTISONE (WESTCORT) 0.2 % CREAM    Apply topically 2 (two) times daily.    LABETALOL (NORMODYNE) 100 MG TABLET    TAKE 1 TABLET BY MOUTH ONCE DAILY AT  6AM    LANCETS MISC    To check BG 1 times daily, to use with insurance preferred meter    LEVETIRACETAM (KEPPRA) 250 MG TAB    Take 1 tablet (250 mg total) by  "mouth 2 (two) times daily on Tuesday, Thursday, Saturday & Sunday. Take 2 tablets (500mg total) by mouth in the morning and 1 tablet (250mg total) in the evening on dialysis days (Monday, Wednesday, & Friday)    RENVELA 800 MG TAB    Take 1 tablet (800 mg total) by mouth 3 (three) times daily with meals.    TAMSULOSIN (FLOMAX) 0.4 MG CAP    Take 2 capsules by mouth once daily         Objective:   Objective   Physical Exam   Vitals:    10/26/23 1343   BP: 124/78   Pulse: 81   Temp: 98.7 °F (37.1 °C)   TempSrc: Oral   SpO2: 99%   Weight: 79.8 kg (175 lb 14.8 oz)   Height: 5' 8" (1.727 m)    Body mass index is 26.75 kg/m².  Weight: 79.8 kg (175 lb 14.8 oz)   Height: 5' 8" (172.7 cm)     Physical Exam  Constitutional:       General: He is not in acute distress.     Appearance: He is well-developed.   Eyes:      Extraocular Movements: Extraocular movements intact.   Cardiovascular:      Rate and Rhythm: Normal rate and regular rhythm.      Heart sounds: Normal heart sounds. No murmur heard.  Pulmonary:      Effort: Pulmonary effort is normal.      Breath sounds: Normal breath sounds.   Musculoskeletal:         General: Normal range of motion.   Skin:     General: Skin is warm and dry.   Neurological:      Mental Status: He is alert and oriented to person, place, and time.      Cranial Nerves: Cranial nerve deficit present.      Motor: Weakness present.      Coordination: Coordination abnormal.      Gait: Gait abnormal.      Comments: Dense right-sided hemiplegia.  In wheelchair I did not challenge him to stand   Psychiatric:         Behavior: Behavior normal.         Thought Content: Thought content normal.         Component      Latest Ref Rng 4/27/2023   Cholesterol Total      120 - 199 mg/dL 190    Triglycerides      30 - 150 mg/dL 106    HDL      40 - 75 mg/dL 36 (L)    LDL Cholesterol External      63.0 - 159.0 mg/dL 132.8    HDL/Cholesterol Ratio      20.0 - 50.0 % 18.9 (L)    Total Cholesterol/HDL Ratio      2.0 - " 5.0  5.3 (H)    Non-HDL Cholesterol      mg/dL 154    Hemoglobin A1C External      4.0 - 5.6 % 5.9 (H)    Estimated Avg Glucose      68 - 131 mg/dL 123       Legend:  (L) Low  (H) High    HGB 12.7 g/dL Males: 14.0 - 18.0 g/dL Females: 12.0 - 16.0 g/dL  Low October 02, 2023   Hemoglobin x 3 38.1 % Male: 14.0 - 18.0 g/dL; Female: 12.0-16.0 g/dL Low October 02, 2023   Ferritin 925 ng/mL Males: 22 - 322 ng/mL; Females: 10 - 291 ng/mL High October 09, 2023   Reticulocyte 0.83 % 0.8 - 2.1 %  - October 09, 2023   TIBC 221 mcg/dL 185-515 mcg/dL  - October 09, 2023   Transferrin Sat. (Calc) 35 % 20-55%  - October 09, 2023   Iron 78 mcg/dL Females:  mcg/dL Males:  mcg/dL  - October 09, 2023   UIBC (Calc) 143 mcg/dL 155-355 mcg/dL  Low October 09, 2023   Neutrophils 57.1 % 40.0-75.0% - October 09, 2023   Monocytes 6.8 % 3.0-10.0% - October 09, 2023   Lymphocytes 27.9 % 19.0-48.0% - October 09, 2023   Basophils 1.3 % 0.0-1.5% - October 09, 2023   Eosinophil 4.4 % 0.0-7.0% - October 09, 2023   SYLVIA 2.6 % 0.0-4.0% - October 09, 2023   RBC 4.64 mill/mcL Males: 4.70 - 6.10 mill/mcL Females: 4.20 - 5.40 mill/mcL  Low October 09, 2023   WBC (No Diff) 3.25 1000/mcL 4.8-10.8 thous/mcL  Low October 09, 2023   HCT 39.8 % Males: 42 - 52% Females: 37 - 47%  Low October 09, 2023   MCHC 32.3 g/dL 30 - 36 g/dL - October 09, 2023   MCH 27.8 pg 27 - 31 pg/cell - October 09, 2023   HGB 12.9 g/dL Males: 14.0 - 18.0 g/dL Females: 12.0 - 16.0 g/dL  Low October 09, 2023   RDW 16.3 % No Reference range provided High October 09, 2023   Platelets 118 1000/mcL 474-954 7680/mcL  Low October 09, 2023   Hemoglobin x 3 38.7 % Male: 14.0 - 18.0 g/dL; Female: 12.0-16.0 g/dL Low October 09, 2023   Hemoglobin x 3 41.1 % Male: 14.0 - 18.0 g/dL; Female: 12.0-16.0 g/dL Low October 16, 2023   HGB 13.7 g/dL Males: 14.0 - 18.0 g/dL Females: 12.0 - 16.0 g/dL  Low October 16, 2023   Hemoglobin x 3 38.7 % Male: 14.0 - 18.0 g/dL; Female: 12.0-16.0 g/dL Low  October 23, 2023   HGB 12.9 g/dL Males: 14.0 - 18.0 g/dL Females: 12.0 - 16.0 g/dL  Low October 23, 2023       BUN/Creat Ratio 5.1 10-20 Low October 09, 2023   Sodium 138 mEq/L 136-145 mEq/L  - October 09, 2023   Potassium 5.1 mEq/L 3.5-5.1 mEq/L  - October 09, 2023   Chloride 97 mEq/L  mEq/L  - October 09, 2023   Bicarbonate 24 mEq/L 22-29 mEq/L  - October 09, 2023   BUN 64 mg/dL 6-19 mg/dl High October 09, 2023   Creatinine, Serum 12.67 mg/dL 0.6-1.3 mg/dL  High October 09, 2023   BUN, Post 18 mg/dL 6-19 mg/dL  - October 09, 2023   URR, Calc 72 % 65 - 80%  - October 09, 2023     Calcium, Total 9.3 mg/dL 8.4-10.2 mg/dL  - October 09, 2023   Phosphorus 4.5 mg/dL 2.6-4.5 mg/dL  - October 09, 2023   Ca x P Product 42 < 55  - October 09, 2023   PTH-Intact, Plasma 314 pg/mL 16 to 80 pg/mL High October 09, 2023   Vitamin D 25 Hydroxy 11.5 ng/mL  ng/mL  Low October 09, 2023   Corrected Ca x P Product 41 < 55 - October 09, 2023     Total Protein 6.7 g/dL 6.0-8.5 g/dL - October 09, 2023   Albumin (BCG) 4.2 g/dL 3.5-5.2 g/dL - October 09, 2023   Globulin (Calc) 2.5 g/dL 1.0 - 2.0 - October 09, 2023   A/G Ratio 1.7 1.0-2.0  - October 09, 2023

## 2023-10-26 ENCOUNTER — OFFICE VISIT (OUTPATIENT)
Dept: FAMILY MEDICINE | Facility: CLINIC | Age: 68
End: 2023-10-26
Payer: MEDICARE

## 2023-10-26 ENCOUNTER — LAB VISIT (OUTPATIENT)
Dept: LAB | Facility: HOSPITAL | Age: 68
End: 2023-10-26
Attending: INTERNAL MEDICINE
Payer: MEDICARE

## 2023-10-26 VITALS
HEART RATE: 81 BPM | WEIGHT: 175.94 LBS | OXYGEN SATURATION: 99 % | DIASTOLIC BLOOD PRESSURE: 78 MMHG | SYSTOLIC BLOOD PRESSURE: 124 MMHG | BODY MASS INDEX: 26.66 KG/M2 | TEMPERATURE: 99 F | HEIGHT: 68 IN

## 2023-10-26 DIAGNOSIS — Z79.4 TYPE 2 DIABETES MELLITUS WITH DIABETIC NEUROPATHY, WITH LONG-TERM CURRENT USE OF INSULIN: ICD-10-CM

## 2023-10-26 DIAGNOSIS — N18.6 CONTROLLED TYPE 2 DIABETES MELLITUS WITH CHRONIC KIDNEY DISEASE ON CHRONIC DIALYSIS, WITH LONG-TERM CURRENT USE OF INSULIN: ICD-10-CM

## 2023-10-26 DIAGNOSIS — Z79.4 CONTROLLED TYPE 2 DIABETES MELLITUS WITH CHRONIC KIDNEY DISEASE ON CHRONIC DIALYSIS, WITH LONG-TERM CURRENT USE OF INSULIN: ICD-10-CM

## 2023-10-26 DIAGNOSIS — I69.351 HEMIPLEGIA AND HEMIPARESIS FOLLOWING CEREBRAL INFARCTION AFFECTING RIGHT DOMINANT SIDE: ICD-10-CM

## 2023-10-26 DIAGNOSIS — Z79.4 CONTROLLED TYPE 2 DIABETES MELLITUS WITH CHRONIC KIDNEY DISEASE ON CHRONIC DIALYSIS, WITH LONG-TERM CURRENT USE OF INSULIN: Primary | ICD-10-CM

## 2023-10-26 DIAGNOSIS — N13.8 BPH WITH OBSTRUCTION/LOWER URINARY TRACT SYMPTOMS: ICD-10-CM

## 2023-10-26 DIAGNOSIS — E78.5 DYSLIPIDEMIA: ICD-10-CM

## 2023-10-26 DIAGNOSIS — G40.909 SEIZURE DISORDER AS SEQUELA OF CEREBROVASCULAR ACCIDENT: ICD-10-CM

## 2023-10-26 DIAGNOSIS — N40.1 BPH WITH OBSTRUCTION/LOWER URINARY TRACT SYMPTOMS: ICD-10-CM

## 2023-10-26 DIAGNOSIS — E11.22 CONTROLLED TYPE 2 DIABETES MELLITUS WITH CHRONIC KIDNEY DISEASE ON CHRONIC DIALYSIS, WITH LONG-TERM CURRENT USE OF INSULIN: Primary | ICD-10-CM

## 2023-10-26 DIAGNOSIS — E11.22 CONTROLLED TYPE 2 DIABETES MELLITUS WITH CHRONIC KIDNEY DISEASE ON CHRONIC DIALYSIS, WITH LONG-TERM CURRENT USE OF INSULIN: ICD-10-CM

## 2023-10-26 DIAGNOSIS — Z86.73 HISTORY OF STROKE: ICD-10-CM

## 2023-10-26 DIAGNOSIS — I10 BENIGN ESSENTIAL HTN: Chronic | ICD-10-CM

## 2023-10-26 DIAGNOSIS — E11.40 TYPE 2 DIABETES MELLITUS WITH DIABETIC NEUROPATHY, WITH LONG-TERM CURRENT USE OF INSULIN: ICD-10-CM

## 2023-10-26 DIAGNOSIS — Z99.2 CONTROLLED TYPE 2 DIABETES MELLITUS WITH CHRONIC KIDNEY DISEASE ON CHRONIC DIALYSIS, WITH LONG-TERM CURRENT USE OF INSULIN: ICD-10-CM

## 2023-10-26 DIAGNOSIS — R97.20 ELEVATED PSA: ICD-10-CM

## 2023-10-26 DIAGNOSIS — N40.0 BENIGN NON-NODULAR PROSTATIC HYPERPLASIA WITHOUT LOWER URINARY TRACT SYMPTOMS: Chronic | ICD-10-CM

## 2023-10-26 DIAGNOSIS — I69.398 SEIZURE DISORDER AS SEQUELA OF CEREBROVASCULAR ACCIDENT: ICD-10-CM

## 2023-10-26 DIAGNOSIS — N18.6 CONTROLLED TYPE 2 DIABETES MELLITUS WITH CHRONIC KIDNEY DISEASE ON CHRONIC DIALYSIS, WITH LONG-TERM CURRENT USE OF INSULIN: Primary | ICD-10-CM

## 2023-10-26 DIAGNOSIS — N18.6 ESRD (END STAGE RENAL DISEASE): ICD-10-CM

## 2023-10-26 DIAGNOSIS — Z99.2 CONTROLLED TYPE 2 DIABETES MELLITUS WITH CHRONIC KIDNEY DISEASE ON CHRONIC DIALYSIS, WITH LONG-TERM CURRENT USE OF INSULIN: Primary | ICD-10-CM

## 2023-10-26 DIAGNOSIS — Z12.12 SCREENING FOR COLORECTAL CANCER: ICD-10-CM

## 2023-10-26 DIAGNOSIS — Z12.11 SCREENING FOR COLORECTAL CANCER: ICD-10-CM

## 2023-10-26 DIAGNOSIS — D61.818 PANCYTOPENIA: ICD-10-CM

## 2023-10-26 DIAGNOSIS — N25.81 SECONDARY HYPERPARATHYROIDISM OF RENAL ORIGIN: ICD-10-CM

## 2023-10-26 DIAGNOSIS — I82.431 ACUTE DEEP VEIN THROMBOSIS (DVT) OF POPLITEAL VEIN OF RIGHT LOWER EXTREMITY: ICD-10-CM

## 2023-10-26 LAB
COMPLEXED PSA SERPL-MCNC: 13.7 NG/ML (ref 0–4)
ESTIMATED AVG GLUCOSE: 123 MG/DL (ref 68–131)
HBA1C MFR BLD: 5.9 % (ref 4–5.6)

## 2023-10-26 PROCEDURE — 1101F PT FALLS ASSESS-DOCD LE1/YR: CPT | Mod: HCNC,CPTII,S$GLB, | Performed by: INTERNAL MEDICINE

## 2023-10-26 PROCEDURE — 3288F FALL RISK ASSESSMENT DOCD: CPT | Mod: HCNC,CPTII,S$GLB, | Performed by: INTERNAL MEDICINE

## 2023-10-26 PROCEDURE — 84153 ASSAY OF PSA TOTAL: CPT | Mod: HCNC | Performed by: UROLOGY

## 2023-10-26 PROCEDURE — 3074F PR MOST RECENT SYSTOLIC BLOOD PRESSURE < 130 MM HG: ICD-10-PCS | Mod: HCNC,CPTII,S$GLB, | Performed by: INTERNAL MEDICINE

## 2023-10-26 PROCEDURE — 3078F DIAST BP <80 MM HG: CPT | Mod: HCNC,CPTII,S$GLB, | Performed by: INTERNAL MEDICINE

## 2023-10-26 PROCEDURE — 1101F PR PT FALLS ASSESS DOC 0-1 FALLS W/OUT INJ PAST YR: ICD-10-PCS | Mod: HCNC,CPTII,S$GLB, | Performed by: INTERNAL MEDICINE

## 2023-10-26 PROCEDURE — 3008F BODY MASS INDEX DOCD: CPT | Mod: HCNC,CPTII,S$GLB, | Performed by: INTERNAL MEDICINE

## 2023-10-26 PROCEDURE — 1160F PR REVIEW ALL MEDS BY PRESCRIBER/CLIN PHARMACIST DOCUMENTED: ICD-10-PCS | Mod: HCNC,CPTII,S$GLB, | Performed by: INTERNAL MEDICINE

## 2023-10-26 PROCEDURE — 1159F PR MEDICATION LIST DOCUMENTED IN MEDICAL RECORD: ICD-10-PCS | Mod: HCNC,CPTII,S$GLB, | Performed by: INTERNAL MEDICINE

## 2023-10-26 PROCEDURE — 99214 OFFICE O/P EST MOD 30 MIN: CPT | Mod: HCNC,S$GLB,, | Performed by: INTERNAL MEDICINE

## 2023-10-26 PROCEDURE — 1160F RVW MEDS BY RX/DR IN RCRD: CPT | Mod: HCNC,CPTII,S$GLB, | Performed by: INTERNAL MEDICINE

## 2023-10-26 PROCEDURE — 99999 PR PBB SHADOW E&M-EST. PATIENT-LVL IV: CPT | Mod: PBBFAC,HCNC,, | Performed by: INTERNAL MEDICINE

## 2023-10-26 PROCEDURE — 3008F PR BODY MASS INDEX (BMI) DOCUMENTED: ICD-10-PCS | Mod: HCNC,CPTII,S$GLB, | Performed by: INTERNAL MEDICINE

## 2023-10-26 PROCEDURE — 3288F PR FALLS RISK ASSESSMENT DOCUMENTED: ICD-10-PCS | Mod: HCNC,CPTII,S$GLB, | Performed by: INTERNAL MEDICINE

## 2023-10-26 PROCEDURE — 83036 HEMOGLOBIN GLYCOSYLATED A1C: CPT | Mod: HCNC | Performed by: INTERNAL MEDICINE

## 2023-10-26 PROCEDURE — 99214 PR OFFICE/OUTPT VISIT, EST, LEVL IV, 30-39 MIN: ICD-10-PCS | Mod: HCNC,S$GLB,, | Performed by: INTERNAL MEDICINE

## 2023-10-26 PROCEDURE — 1159F MED LIST DOCD IN RCRD: CPT | Mod: HCNC,CPTII,S$GLB, | Performed by: INTERNAL MEDICINE

## 2023-10-26 PROCEDURE — 3078F PR MOST RECENT DIASTOLIC BLOOD PRESSURE < 80 MM HG: ICD-10-PCS | Mod: HCNC,CPTII,S$GLB, | Performed by: INTERNAL MEDICINE

## 2023-10-26 PROCEDURE — 3044F PR MOST RECENT HEMOGLOBIN A1C LEVEL <7.0%: ICD-10-PCS | Mod: HCNC,CPTII,S$GLB, | Performed by: INTERNAL MEDICINE

## 2023-10-26 PROCEDURE — 3074F SYST BP LT 130 MM HG: CPT | Mod: HCNC,CPTII,S$GLB, | Performed by: INTERNAL MEDICINE

## 2023-10-26 PROCEDURE — 99999 PR PBB SHADOW E&M-EST. PATIENT-LVL IV: ICD-10-PCS | Mod: PBBFAC,HCNC,, | Performed by: INTERNAL MEDICINE

## 2023-10-26 PROCEDURE — 3044F HG A1C LEVEL LT 7.0%: CPT | Mod: HCNC,CPTII,S$GLB, | Performed by: INTERNAL MEDICINE

## 2023-10-26 PROCEDURE — 36415 COLL VENOUS BLD VENIPUNCTURE: CPT | Mod: HCNC,PO | Performed by: UROLOGY

## 2023-10-26 RX ORDER — LABETALOL 100 MG/1
100 TABLET, FILM COATED ORAL DAILY
Qty: 90 TABLET | Refills: 3 | Status: ON HOLD | OUTPATIENT
Start: 2023-10-26

## 2023-10-26 RX ORDER — ASPIRIN 81 MG/1
81 TABLET ORAL DAILY
Qty: 90 TABLET | Refills: 3 | Status: ON HOLD | OUTPATIENT
Start: 2023-10-26 | End: 2024-10-25

## 2023-10-26 RX ORDER — ATORVASTATIN CALCIUM 80 MG/1
80 TABLET, FILM COATED ORAL DAILY
Qty: 90 TABLET | Refills: 3 | Status: ON HOLD | OUTPATIENT
Start: 2023-10-26 | End: 2024-10-25

## 2023-10-26 RX ORDER — AMLODIPINE BESYLATE 10 MG/1
TABLET ORAL
Qty: 90 TABLET | Refills: 3 | Status: ON HOLD | OUTPATIENT
Start: 2023-10-26

## 2023-10-26 RX ORDER — SEVELAMER CARBONATE 800 MG/1
800 TABLET, FILM COATED ORAL
Qty: 90 TABLET | Refills: 0 | Status: CANCELLED | OUTPATIENT
Start: 2023-10-26

## 2023-10-26 RX ORDER — TAMSULOSIN HYDROCHLORIDE 0.4 MG/1
2 CAPSULE ORAL DAILY
Qty: 180 CAPSULE | Refills: 3 | Status: SHIPPED | OUTPATIENT
Start: 2023-10-26 | End: 2024-02-27 | Stop reason: SDUPTHER

## 2023-11-07 ENCOUNTER — OFFICE VISIT (OUTPATIENT)
Dept: UROLOGY | Facility: CLINIC | Age: 68
End: 2023-11-07
Payer: MEDICARE

## 2023-11-07 DIAGNOSIS — N13.8 BPH WITH OBSTRUCTION/LOWER URINARY TRACT SYMPTOMS: ICD-10-CM

## 2023-11-07 DIAGNOSIS — N40.1 BPH WITH OBSTRUCTION/LOWER URINARY TRACT SYMPTOMS: ICD-10-CM

## 2023-11-07 DIAGNOSIS — R97.20 ELEVATED PSA: Primary | ICD-10-CM

## 2023-11-07 DIAGNOSIS — R35.1 NOCTURIA MORE THAN TWICE PER NIGHT: ICD-10-CM

## 2023-11-07 PROCEDURE — 1160F RVW MEDS BY RX/DR IN RCRD: CPT | Mod: HCNC,CPTII,S$GLB, | Performed by: UROLOGY

## 2023-11-07 PROCEDURE — 3288F PR FALLS RISK ASSESSMENT DOCUMENTED: ICD-10-PCS | Mod: HCNC,CPTII,S$GLB, | Performed by: UROLOGY

## 2023-11-07 PROCEDURE — 1160F PR REVIEW ALL MEDS BY PRESCRIBER/CLIN PHARMACIST DOCUMENTED: ICD-10-PCS | Mod: HCNC,CPTII,S$GLB, | Performed by: UROLOGY

## 2023-11-07 PROCEDURE — 99999 PR PBB SHADOW E&M-EST. PATIENT-LVL III: CPT | Mod: PBBFAC,HCNC,, | Performed by: UROLOGY

## 2023-11-07 PROCEDURE — 1159F MED LIST DOCD IN RCRD: CPT | Mod: HCNC,CPTII,S$GLB, | Performed by: UROLOGY

## 2023-11-07 PROCEDURE — 1159F PR MEDICATION LIST DOCUMENTED IN MEDICAL RECORD: ICD-10-PCS | Mod: HCNC,CPTII,S$GLB, | Performed by: UROLOGY

## 2023-11-07 PROCEDURE — 1101F PT FALLS ASSESS-DOCD LE1/YR: CPT | Mod: HCNC,CPTII,S$GLB, | Performed by: UROLOGY

## 2023-11-07 PROCEDURE — 3044F PR MOST RECENT HEMOGLOBIN A1C LEVEL <7.0%: ICD-10-PCS | Mod: HCNC,CPTII,S$GLB, | Performed by: UROLOGY

## 2023-11-07 PROCEDURE — 99214 OFFICE O/P EST MOD 30 MIN: CPT | Mod: HCNC,S$GLB,, | Performed by: UROLOGY

## 2023-11-07 PROCEDURE — 1101F PR PT FALLS ASSESS DOC 0-1 FALLS W/OUT INJ PAST YR: ICD-10-PCS | Mod: HCNC,CPTII,S$GLB, | Performed by: UROLOGY

## 2023-11-07 PROCEDURE — 3288F FALL RISK ASSESSMENT DOCD: CPT | Mod: HCNC,CPTII,S$GLB, | Performed by: UROLOGY

## 2023-11-07 PROCEDURE — 99999 PR PBB SHADOW E&M-EST. PATIENT-LVL III: ICD-10-PCS | Mod: PBBFAC,HCNC,, | Performed by: UROLOGY

## 2023-11-07 PROCEDURE — 3044F HG A1C LEVEL LT 7.0%: CPT | Mod: HCNC,CPTII,S$GLB, | Performed by: UROLOGY

## 2023-11-07 PROCEDURE — 99214 PR OFFICE/OUTPT VISIT, EST, LEVL IV, 30-39 MIN: ICD-10-PCS | Mod: HCNC,S$GLB,, | Performed by: UROLOGY

## 2023-11-07 NOTE — PROGRESS NOTES
Subjective:       Patient ID: Miguel Moore is a 68 y.o. male The patient's last visit with me was on 8/1/2023.     Chief Complaint:   Chief Complaint   Patient presents with    Follow-up     Hematuria  Patient complains of gross hematuria. Onset of hematuria was a few months ago and was gradual in onset. There is not a history of nephrolithiasis. There is not a history of urologic trauma. Other urologic symptoms include slow stream, hesitancy, intermittency, nocturia. Patient admits to history of no risk factors for cancer. Patient denies history of Agent Orange exposure, chronic Nunez catheter,  surgeries, occupational exposure, sexually transmitted diseases, tobacco use, trauma and urolithiasis. Prior workup has been none.  He had a CT in January when he was hospitalized with renal failure.    He had a Cystoscopy with simple UDS in 2017 by Dr. Chambers.  This showed an enlarged prostate and incomplete bladder emptying.    He is now on MWF dialysis.    9/15/2022  He had a UTI in August 2022.  He had a negative prostate biopsy in February 2021 for a PSA 10.2.  He thinks he has a UTI due to a strong smell and dark color.     5/2/2023  He has noted some straining for the past week.  The odor is off.    8/1/2023  He was given antibiotics last visit.  He is improved after antibiotics.  He has noted the odor is returning.      11/07/2023  He is back with a new PSA.  He tells me his stream is okay.  He is still taking Flomax and Proscar.        ACTIVE MEDICAL ISSUES:  Patient Active Problem List   Diagnosis    Benign essential HTN    Dyslipidemia    Controlled type 2 diabetes mellitus with chronic kidney disease on chronic dialysis, with long-term current use of insulin    BPH (benign prostatic hyperplasia) 2/18/21 prostate bx normal    Seizure disorder as sequela of cerebrovascular accident    Hemiplegia and hemiparesis following cerebral infarction affecting right dominant side    Microcytosis 9/2019 labs c/w AoCD and  AoCKD    ESRD (end stage renal disease)    Nuclear sclerosis, left    Cortical cataract of both eyes    DM type 2 without retinopathy    Secondary hyperparathyroidism of renal origin    Vitamin D deficiency    Right sided weakness    Senile nuclear sclerosis    CME (cystoid macular edema), bilateral    Refractive error    History of stroke    Type 2 diabetes mellitus with diabetic neuropathy, with long-term current use of insulin    Nephrotic syndrome    Anasarca    Hypocalcemia    History of fungal infection candida parasilosis hospitalization 8/2020    Chronic deep vein thrombosis (DVT) of popliteal vein of right lower extremity 9/21/20 outside [place]; unprovoked; anticoagulation    Pancytopenia    Elevated liver enzymes    Pulmonary nodule 1/2021 4 mm nodule.    Hematuria    Elevated PSA 2/18/21 prostate bx normal    Anemia in chronic kidney disease    History of diabetic ulcer of foot    Pseudophakia    Bilateral posterior capsular opacification       ALLERGIES AND MEDICATIONS: updated and reviewed.  Review of patient's allergies indicates:   Allergen Reactions    Ace inhibitors      Hyperkalemia 8/2018  Other reaction(s): Unknown    Penicillins Hives    Tizanidine      Felt hot     Current Outpatient Medications   Medication Sig    amLODIPine (NORVASC) 10 MG tablet Take 1 tablet by mouth once daily    apixaban (ELIQUIS) 5 mg Tab Take 1 tablet (5 mg total) by mouth 2 (two) times daily.    aspirin (ECOTRIN) 81 MG EC tablet Take 1 tablet (81 mg total) by mouth once daily.    atorvastatin (LIPITOR) 80 MG tablet Take 1 tablet (80 mg total) by mouth once daily.    blood sugar diagnostic Strp To check BG 1 times daily, to use with insurance preferred meter    blood-glucose meter kit To check BG 1 times daily, to use with insurance preferred meter    CALCITRIOL ORAL Take 1.25 mcg by mouth 3 (three) times a week.    cholecalciferol, vitamin D3, (VITAMIN D3) 25 mcg (1,000 unit) capsule Take 1 capsule (1,000 Units total) by  mouth once daily.    clotrimazole-betamethasone 1-0.05% (LOTRISONE) cream Apply topically 2 (two) times daily.    finasteride (PROSCAR) 5 mg tablet Take 1 tablet (5 mg total) by mouth once daily.    hydrocortisone (WESTCORT) 0.2 % cream Apply topically 2 (two) times daily.    labetaloL (NORMODYNE) 100 MG tablet Take 1 tablet (100 mg total) by mouth once daily.    lancets Misc To check BG 1 times daily, to use with insurance preferred meter    levETIRAcetam (KEPPRA) 250 MG Tab Take 1 tablet (250 mg total) by mouth 2 (two) times daily on Tuesday, Thursday, Saturday & Sunday. Take 2 tablets (500mg total) by mouth in the morning and 1 tablet (250mg total) in the evening on dialysis days (Monday, Wednesday, & Friday)    RENVELA 800 mg Tab Take 1 tablet (800 mg total) by mouth 3 (three) times daily with meals.    tamsulosin (FLOMAX) 0.4 mg Cap Take 2 capsules (0.8 mg total) by mouth once daily.     No current facility-administered medications for this visit.       Review of Systems   Constitutional:  Negative for activity change, fatigue, fever and unexpected weight change.   HENT:  Negative for congestion.    Eyes:  Negative for redness.   Respiratory:  Negative for chest tightness and shortness of breath.    Cardiovascular:  Negative for chest pain and leg swelling.   Gastrointestinal:  Negative for abdominal pain, constipation, diarrhea, nausea and vomiting.   Genitourinary:  Negative for dysuria, flank pain, frequency, hematuria, penile pain, penile swelling, scrotal swelling, testicular pain and urgency.   Musculoskeletal:  Negative for arthralgias and back pain.   Neurological:  Negative for dizziness and light-headedness.   Psychiatric/Behavioral:  Negative for behavioral problems and confusion. The patient is not nervous/anxious.    All other systems reviewed and are negative.      Objective:      There were no vitals filed for this visit.      Physical Exam  Vitals and nursing note reviewed.   Constitutional:        Appearance: He is well-developed.   HENT:      Head: Normocephalic.   Eyes:      Conjunctiva/sclera: Conjunctivae normal.   Neck:      Thyroid: No thyromegaly.      Trachea: No tracheal deviation.   Cardiovascular:      Rate and Rhythm: Normal rate.      Heart sounds: Normal heart sounds.   Pulmonary:      Effort: Pulmonary effort is normal. No respiratory distress.      Breath sounds: Normal breath sounds. No wheezing.   Abdominal:      General: Bowel sounds are normal.      Palpations: Abdomen is soft.      Tenderness: There is no abdominal tenderness. There is no rebound.      Hernia: No hernia is present.   Musculoskeletal:         General: No tenderness. Normal range of motion.      Cervical back: Normal range of motion and neck supple.   Lymphadenopathy:      Cervical: No cervical adenopathy.   Skin:     General: Skin is warm and dry.      Findings: No erythema or rash.   Neurological:      Mental Status: He is alert and oriented to person, place, and time.   Psychiatric:         Behavior: Behavior normal.         Thought Content: Thought content normal.         Judgment: Judgment normal.         Urine dipstick shows not done.  Micro exam: not done.     Component Ref Range & Units 12 d ago 2 yr ago   PSA Diagnostic 0.00 - 4.00 ng/mL 13.7 High   16.7 High  CM    Comment: The testing method is a chemiluminescent microparticle immunoassay   manufactured by [employer] and performed on the [employer]   or   [employer] system. Values obtained with different assay manufacturers   for   methods may be different and cannot be used interchangeably.   PSA Expected levels:   Hormonal Therapy: <0.05 ng/ml   Prostatectomy: <0.01 ng/ml   Radiation Therapy: <1.00 ng/ml    Resulting Agency  [facility]              Specimen Collected: 10/26/23 14:39 Last Resulted: 10/26/23 21:13               Assessment:       1. Elevated PSA    2. BPH with obstruction/lower urinary tract symptoms    3. Nocturia more than twice per  night                Plan:       1. Elevated PSA    - MRI Prostate W W/O Contrast; Future    2. BPH with obstruction/lower urinary tract symptoms  Flomax and Proscar    3. Nocturia more than twice per night  stable             Follow up in about 4 weeks (around 12/5/2023).

## 2023-11-14 ENCOUNTER — OFFICE VISIT (OUTPATIENT)
Dept: PODIATRY | Facility: CLINIC | Age: 68
End: 2023-11-14
Payer: MEDICARE

## 2023-11-14 VITALS
HEIGHT: 68 IN | SYSTOLIC BLOOD PRESSURE: 123 MMHG | BODY MASS INDEX: 26.66 KG/M2 | HEART RATE: 73 BPM | WEIGHT: 175.94 LBS | DIASTOLIC BLOOD PRESSURE: 70 MMHG

## 2023-11-14 DIAGNOSIS — E11.22 CONTROLLED TYPE 2 DIABETES MELLITUS WITH CHRONIC KIDNEY DISEASE ON CHRONIC DIALYSIS, WITH LONG-TERM CURRENT USE OF INSULIN: Primary | ICD-10-CM

## 2023-11-14 DIAGNOSIS — L84 CORN OR CALLUS: ICD-10-CM

## 2023-11-14 DIAGNOSIS — Z79.4 CONTROLLED TYPE 2 DIABETES MELLITUS WITH CHRONIC KIDNEY DISEASE ON CHRONIC DIALYSIS, WITH LONG-TERM CURRENT USE OF INSULIN: Primary | ICD-10-CM

## 2023-11-14 DIAGNOSIS — B35.1 ONYCHOMYCOSIS DUE TO DERMATOPHYTE: ICD-10-CM

## 2023-11-14 DIAGNOSIS — Z99.2 CONTROLLED TYPE 2 DIABETES MELLITUS WITH CHRONIC KIDNEY DISEASE ON CHRONIC DIALYSIS, WITH LONG-TERM CURRENT USE OF INSULIN: Primary | ICD-10-CM

## 2023-11-14 DIAGNOSIS — N18.6 CONTROLLED TYPE 2 DIABETES MELLITUS WITH CHRONIC KIDNEY DISEASE ON CHRONIC DIALYSIS, WITH LONG-TERM CURRENT USE OF INSULIN: Primary | ICD-10-CM

## 2023-11-14 PROCEDURE — 99499 NO LOS: ICD-10-PCS | Mod: HCNC,S$GLB,, | Performed by: PODIATRIST

## 2023-11-14 PROCEDURE — 99499 UNLISTED E&M SERVICE: CPT | Mod: HCNC,S$GLB,, | Performed by: PODIATRIST

## 2023-11-14 PROCEDURE — 99999 PR PBB SHADOW E&M-EST. PATIENT-LVL IV: ICD-10-PCS | Mod: PBBFAC,HCNC,, | Performed by: PODIATRIST

## 2023-11-14 PROCEDURE — 11721 DEBRIDE NAIL 6 OR MORE: CPT | Mod: 59,Q9,HCNC,S$GLB | Performed by: PODIATRIST

## 2023-11-14 PROCEDURE — 11055 PR TRIM HYPERKERATOTIC SKIN LESION, ONE: ICD-10-PCS | Mod: Q9,HCNC,S$GLB, | Performed by: PODIATRIST

## 2023-11-14 PROCEDURE — 99999 PR PBB SHADOW E&M-EST. PATIENT-LVL IV: CPT | Mod: PBBFAC,HCNC,, | Performed by: PODIATRIST

## 2023-11-14 PROCEDURE — 11721 PR DEBRIDEMENT OF NAILS, 6 OR MORE: ICD-10-PCS | Mod: 59,Q9,HCNC,S$GLB | Performed by: PODIATRIST

## 2023-11-14 PROCEDURE — 11055 PARING/CUTG B9 HYPRKER LES 1: CPT | Mod: Q9,HCNC,S$GLB, | Performed by: PODIATRIST

## 2023-11-15 NOTE — PROGRESS NOTES
Subjective:     Patient ID: Miguel Moore is a 68 y.o. male.    Chief Complaint: Diabetes Mellitus (10/26/23 Dr Raymond PCP) and Nail Care    Miguel is a 68 y.o. male who presents to the clinic for evaluation and treatment of high risk feet. Miguel has a past medical history of Allergy, Clotting disorder, Deep vein thrombosis, Diabetes mellitus, type 2, Hypertension, Nuclear sclerosis of both eyes (6/9/2017), Renal disorder, Seizures, Stroke, and Urinary tract infection. The patient's chief complaint is long, thick toenails. This patient has documented high risk feet requiring routine maintenance secondary to peripheral neuropathy.    PCP: Raciel Raymond MD    Date Last Seen by PCP: per above    Current shoe gear:  Affected Foot: Tennis shoes     Unaffected Foot: Tennis shoes    Hemoglobin A1C   Date Value Ref Range Status   10/26/2023 5.9 (H) 4.0 - 5.6 % Final     Comment:     ADA Screening Guidelines:  5.7-6.4%  Consistent with prediabetes  >or=6.5%  Consistent with diabetes    High levels of fetal hemoglobin interfere with the HbA1C  assay. Heterozygous hemoglobin variants (HbS, HgC, etc)do  not significantly interfere with this assay.   However, presence of multiple variants may affect accuracy.     04/27/2023 5.9 (H) 4.0 - 5.6 % Final     Comment:     ADA Screening Guidelines:  5.7-6.4%  Consistent with prediabetes  >or=6.5%  Consistent with diabetes    High levels of fetal hemoglobin interfere with the HbA1C  assay. Heterozygous hemoglobin variants (HbS, HgC, etc)do  not significantly interfere with this assay.   However, presence of multiple variants may affect accuracy.     11/02/2021 5.9 (H) 4.0 - 5.6 % Final     Comment:     ADA Screening Guidelines:  5.7-6.4%  Consistent with prediabetes  >or=6.5%  Consistent with diabetes    High levels of fetal hemoglobin interfere with the HbA1C  assay. Heterozygous hemoglobin variants (HbS, HgC, etc)do  not significantly interfere with this assay.   However,  presence of multiple variants may affect accuracy.         Review of Systems   Constitutional: Negative for chills.   Cardiovascular:  Negative for chest pain and claudication.   Respiratory:  Negative for cough.    Skin:  Positive for color change, dry skin and nail changes.   Musculoskeletal:  Positive for joint pain.   Gastrointestinal:  Negative for nausea.   Neurological:  Positive for paresthesias. Negative for numbness.   Psychiatric/Behavioral:  The patient is not nervous/anxious.         Objective:     Physical Exam  Constitutional:       Appearance: He is well-developed.   Cardiovascular:      Comments: Dorsalis pedis and posterior tibial pulses are diminished Bilaterally. Toes are cool to touch. Feet are warm proximally.There is decreased digital hair. Skin is atrophic, slightly hyperpigmented, and mildly edematous   Pulmonary:      Effort: No respiratory distress.   Musculoskeletal:         General: Tenderness present.      Comments: Adequate joint range of motion without pain, limitation, nor crepitation Bilateral feet and ankle joints. Muscle strength is 5/5 in all groups bilaterally.   Decreased stride, station of gait.  apropulsive toe off.  Increased angle and base of gait.      Patient has hammertoes of digits 2-5 bilateral partially reducible without symptom today.     Visible and palpable bunion without pain at dorsomedial 1st metatarsal head right and left.  Hallux abducted right and left partially reducible, tracks laterally without being track bound.  No ecchymosis, erythema, edema, or cardinal signs infection or signs of trauma same foot.         Feet:      Right foot:      Skin integrity: Callus and dry skin present. No ulcer or skin breakdown.      Left foot:      Skin integrity: Dry skin present. No ulcer.   Skin:     General: Skin is dry.      Findings: No erythema.      Comments: Nails x10 are elongated by 2-6mm's, thickened by 3-5 mm's, dystrophic, and are darkened in coloration .  Xerosis Bilaterally. No open lesions noted   Focal hyperkeratotic lesion consisting entirely of hyperkeratotic tissue without open skin, drainage, pus, fluctuance, malodor, or signs of infection: right heel      Neurological:      Mental Status: He is alert.      Comments: Candor-Alex 5.07 monofilamant testing is diminished Desmond feet. Sharp/dull sensation diminished Bilaterally. Light touch absent Bilaterally.            Assessment:      Encounter Diagnoses   Name Primary?    Controlled type 2 diabetes mellitus with chronic kidney disease on chronic dialysis, with long-term current use of insulin Yes    Onychomycosis due to dermatophyte     Corn or callus        Plan:     Miguel was seen today for diabetes mellitus and nail care.    Diagnoses and all orders for this visit:    Controlled type 2 diabetes mellitus with chronic kidney disease on chronic dialysis, with long-term current use of insulin    Onychomycosis due to dermatophyte    Corn or callus        I counseled the patient on his conditions, their implications and medical management.    - Shoe inspection. Diabetic Foot Education. Patient reminded of the importance of good nutrition and blood sugar control to help prevent podiatric complications of diabetes. Patient instructed on proper foot hygeine. We discussed wearing proper shoe gear, daily foot inspections, never walking without protective shoe gear, never putting sharp instruments to feet, routine podiatric nail visits every 2-3 months.   - With patient's permission, nails were aggressively reduced and debrided x 10 to their soft tissue attachment mechanically and with electric , removing all offending nail and debris. Patient relates relief following the procedure. He will continue to monitor the areas daily, inspect his feet, wear protective shoe gear when ambulatory, moisturizer to maintain skin integrity and follow in this office in approximately 2-3 months, sooner p.r.n.   - After  cleansing the area w/ alcohol prep pad the above mentioned hyperkeratosis was trimmed utilizing No 15 scapel, to a smooth base with out incident. Right heel

## 2023-12-11 ENCOUNTER — TELEPHONE (OUTPATIENT)
Dept: VASCULAR SURGERY | Facility: CLINIC | Age: 68
End: 2023-12-11
Payer: MEDICARE

## 2023-12-12 ENCOUNTER — TELEPHONE (OUTPATIENT)
Dept: UROLOGY | Facility: CLINIC | Age: 68
End: 2023-12-12
Payer: MEDICARE

## 2023-12-12 ENCOUNTER — TELEPHONE (OUTPATIENT)
Dept: FAMILY MEDICINE | Facility: CLINIC | Age: 68
End: 2023-12-12
Payer: MEDICARE

## 2023-12-12 ENCOUNTER — OFFICE VISIT (OUTPATIENT)
Dept: URGENT CARE | Facility: CLINIC | Age: 68
End: 2023-12-12
Payer: MEDICARE

## 2023-12-12 VITALS
OXYGEN SATURATION: 98 % | HEART RATE: 70 BPM | SYSTOLIC BLOOD PRESSURE: 137 MMHG | BODY MASS INDEX: 26.61 KG/M2 | RESPIRATION RATE: 17 BRPM | DIASTOLIC BLOOD PRESSURE: 88 MMHG | TEMPERATURE: 98 F | WEIGHT: 175 LBS

## 2023-12-12 DIAGNOSIS — S61.309A FINGERNAIL AVULSION, COMPLETE, INITIAL ENCOUNTER: Primary | ICD-10-CM

## 2023-12-12 DIAGNOSIS — S80.12XA HEMATOMA OF LEG, LEFT, INITIAL ENCOUNTER: ICD-10-CM

## 2023-12-12 PROCEDURE — 99215 PR OFFICE/OUTPT VISIT, EST, LEVL V, 40-54 MIN: ICD-10-PCS | Mod: S$GLB,,, | Performed by: EMERGENCY MEDICINE

## 2023-12-12 PROCEDURE — 99215 OFFICE O/P EST HI 40 MIN: CPT | Mod: S$GLB,,, | Performed by: EMERGENCY MEDICINE

## 2023-12-12 RX ORDER — MUPIROCIN 20 MG/G
OINTMENT TOPICAL
Qty: 22 G | Refills: 1 | Status: SHIPPED | OUTPATIENT
Start: 2023-12-12 | End: 2024-01-18 | Stop reason: SDUPTHER

## 2023-12-12 NOTE — TELEPHONE ENCOUNTER
Informed patient nothing is available in clinic offered another clinic patient declined. Patient states he will go to urgent care

## 2023-12-12 NOTE — PATIENT INSTRUCTIONS
USE THE BACTROBAN OINTMENT PRESCRIPTION SENT TO PHARMACY FOR BOTH THE NAIL AND THE LEG WOUND   KEEP CLEAN AND COVERED AND WASH WITH ANTIBACTERIAL SOAP   USE SPLINT FOR THE FINGER   RETURN FOR ANY CONCERNS AND FOLLOW UP WITH PCP.

## 2023-12-12 NOTE — PROGRESS NOTES
Subjective:      Patient ID: Miguel Moore is a 68 y.o. male.    Vitals:  weight is 79.4 kg (175 lb). His oral temperature is 97.9 °F (36.6 °C). His blood pressure is 137/88 and his pulse is 70. His respiration is 17 and oxygen saturation is 98%.     Chief Complaint: Finger Pain    Pt states that he ripped off his left index fingernail on 12/11.  Pt also states that he has a knot on his right lower leg that is tender .     Patient is a 68-year-old male who states that he had a loose nail to the left inner finger and was trying to open a bottle and the nail completely avulsed.  No significant pain or bleeding or laceration.  Physical exam shows an adult it.  CVA with right upper and lower extremity he hit his left anteromedial shin with resultant hematoma versus abscess versus cyst.  It is about 2 x 2 cm and raised and fluctuant.  Small incision made and able to drain clotted blood consistent with contusion.  No pus or signs of infection.  Will prescribe antibiotic ointment Bactroban for both sites and continued local wound care with antibacterial soap of dial and antibiotic Bactroban to the wounds and keeping clean and covered.  Will follow-up with PCP.    Finger Pain  This is a new problem. The current episode started yesterday. The problem occurs constantly. The problem has been unchanged. Pertinent negatives include no arthralgias, chest pain, chills, coughing, fever, neck pain, numbness or sore throat. He has tried nothing for the symptoms.     ROS    Constitution: Negative for chills and fever.   HENT:  Negative for postnasal drip, sinus pain and sore throat.    Neck: Negative for neck pain and neck stiffness.   Cardiovascular:  Negative for chest pain and palpitations.   Respiratory:  Negative for cough and shortness of breath.    Genitourinary:  Negative for dysuria and hematuria.   Musculoskeletal:  Positive for pain and trauma. Negative for joint pain.   Skin:  Positive for color change and skin  thickening/induration. Negative for wound, laceration and erythema.   Neurological:  Negative for altered mental status, numbness and tingling.   Psychiatric/Behavioral:  Negative for altered mental status.       Objective:     Physical Exam   Constitutional: He is oriented to person, place, and time. He appears well-developed.   HENT:   Head: Normocephalic and atraumatic. Head is without abrasion, without contusion and without laceration.   Ears:   Right Ear: External ear normal.   Left Ear: External ear normal.   Nose: Nose normal.   Mouth/Throat: Oropharynx is clear and moist and mucous membranes are normal.   Eyes: Conjunctivae, EOM and lids are normal. Pupils are equal, round, and reactive to light.   Neck: Trachea normal and phonation normal. Neck supple.   Cardiovascular: Normal rate, regular rhythm and normal heart sounds.   Pulmonary/Chest: Effort normal and breath sounds normal. No stridor. No respiratory distress.   Musculoskeletal: Normal range of motion.         General: Normal range of motion.      Comments: EXAMINATION OF THE LEFT INDEX FINGER SHOWS A COMPLETELY AVULSED NAIL WITHOUT SIGNS OF INFECTION IN THE NAILBED IS INTACT     EXAMINATION OF THE RIGHT LOWER EXTREMITY SHOWS LOWER ANTEROMEDIAL SKIN THICKENING AND RAISED AREA CONSISTENT WITH EITHER ABSCESS OR CYST OR HEMATOMA.  TINY INCISION MADE PROVING HEMATOMA WITHOUT ABSCESS.  DISTALLY NEUROVASCULARLY INTACT.   Neurological: He is alert and oriented to person, place, and time.   Skin: Skin is warm, dry, intact and no rash. Capillary refill takes less than 2 seconds. No abrasion, No burn, No bruising, No erythema and No ecchymosis   Psychiatric: His speech is normal and behavior is normal. Judgment and thought content normal.   Nursing note and vitals reviewed.       Assessment:     1. Fingernail avulsion, complete, initial encounter    2. Hematoma of leg, left, initial encounter        Plan:       Fingernail avulsion, complete, initial  encounter    Hematoma of leg, left, initial encounter    Other orders  -     mupirocin (BACTROBAN) 2 % ointment; Apply to affected area 3 times daily  Dispense: 22 g; Refill: 1      Patient Instructions   USE THE BACTROBAN OINTMENT PRESCRIPTION SENT TO PHARMACY FOR BOTH THE NAIL AND THE LEG WOUND   KEEP CLEAN AND COVERED AND WASH WITH ANTIBACTERIAL SOAP   USE SPLINT FOR THE FINGER   RETURN FOR ANY CONCERNS AND FOLLOW UP WITH PCP.

## 2023-12-12 NOTE — TELEPHONE ENCOUNTER
----- Message from Evelin Negro sent at 12/12/2023  8:43 AM CST -----  Type: Patient Call Back         Who called: TRACI CHAO [[id number]]          What is the request in detail: Pt states he has a knot on his leg. He would like to be seen today          Can the clinic reply by [facility]? No         Would the patient rather a call back or a response via [facility]? Call back         Best call back number:  Telephone Information:  Mobile          [phone]             Additional Information:         Thank you.

## 2023-12-13 ENCOUNTER — OFFICE VISIT (OUTPATIENT)
Dept: URGENT CARE | Facility: CLINIC | Age: 68
End: 2023-12-13
Payer: MEDICARE

## 2023-12-13 VITALS
RESPIRATION RATE: 20 BRPM | BODY MASS INDEX: 26.52 KG/M2 | TEMPERATURE: 99 F | WEIGHT: 175 LBS | OXYGEN SATURATION: 98 % | HEIGHT: 68 IN | SYSTOLIC BLOOD PRESSURE: 110 MMHG | DIASTOLIC BLOOD PRESSURE: 57 MMHG | HEART RATE: 80 BPM

## 2023-12-13 DIAGNOSIS — T14.8XXA BLEEDING FROM WOUND: ICD-10-CM

## 2023-12-13 DIAGNOSIS — S80.12XD HEMATOMA OF LEG, LEFT, SUBSEQUENT ENCOUNTER: Primary | ICD-10-CM

## 2023-12-13 PROCEDURE — 12001 LACERATION REPAIR: ICD-10-PCS | Mod: S$GLB,,, | Performed by: NURSE PRACTITIONER

## 2023-12-13 PROCEDURE — 99213 PR OFFICE/OUTPT VISIT, EST, LEVL III, 20-29 MIN: ICD-10-PCS | Mod: 25,S$GLB,, | Performed by: NURSE PRACTITIONER

## 2023-12-13 PROCEDURE — 99213 OFFICE O/P EST LOW 20 MIN: CPT | Mod: 25,S$GLB,, | Performed by: NURSE PRACTITIONER

## 2023-12-13 PROCEDURE — 12001 RPR S/N/AX/GEN/TRNK 2.5CM/<: CPT | Mod: S$GLB,,, | Performed by: NURSE PRACTITIONER

## 2023-12-13 RX ORDER — ACETAMINOPHEN 325 MG/1
650 TABLET ORAL
Status: COMPLETED | OUTPATIENT
Start: 2023-12-13 | End: 2023-12-13

## 2023-12-13 RX ADMIN — ACETAMINOPHEN 650 MG: 325 TABLET ORAL at 05:12

## 2023-12-13 NOTE — PROGRESS NOTES
"Subjective:      Patient ID: Miguel Moore is a 68 y.o. male.    Vitals:  height is 5' 8" (1.727 m) and weight is 79.4 kg (175 lb). His oral temperature is 98.5 °F (36.9 °C). His blood pressure is 110/57 (abnormal) and his pulse is 80. His respiration is 20 and oxygen saturation is 98%.     Chief Complaint: Leg Injury    68-year-old male with medical history as listed below presents to clinic for evaluation of continued bleeding from left lower leg wound.  Patient was seen in clinic yesterday for hematoma to lower leg.  Hematoma was lanced, no signs of infection, bandage, with Ace wrap for compression.  Wife states that bleeding continued through bandage.  She reports attempting to address wound today.  Patient did have dialysis today.  Was advised to return to clinic for re-evaluation.  He is awake and alert, behavior appropriate, no acute distress noted on today's visit.    Past Medical History:  No date: Allergy  No date: Clotting disorder      Comment:  Elaquis and APlavix  No date: Deep vein thrombosis  No date: Diabetes mellitus, type 2  No date: Hypertension  6/9/2017: Nuclear sclerosis of both eyes  No date: Renal disorder  No date: Seizures  No date: Stroke  No date: Urinary tract infection      Leg Pain   The incident occurred 12 to 24 hours ago. The incident occurred at home. There was no injury mechanism. The quality of the pain is described as aching. The pain is at a severity of 0/10. The patient is experiencing no pain. The pain has been Constant since onset. Pertinent negatives include no numbness. The symptoms are aggravated by movement. He has tried nothing for the symptoms. The treatment provided no relief.       Constitution: Negative for activity change.   Cardiovascular:  Negative for chest pain.   Respiratory:  Negative for shortness of breath.    Skin:  Positive for wound. Negative for erythema.   Neurological:  Negative for numbness and tingling.      Objective:     Physical Exam "   Constitutional: He is oriented to person, place, and time. He appears well-developed.  Non-toxic appearance. He does not appear ill.   HENT:   Head: Normocephalic and atraumatic. Head is without abrasion, without contusion and without laceration.   Ears:   Right Ear: External ear normal.   Left Ear: External ear normal.   Mouth/Throat: Oropharynx is clear and moist and mucous membranes are normal.   Eyes: Conjunctivae, EOM and lids are normal.   Neck: Trachea normal and phonation normal.   Cardiovascular: Normal rate.   Pulmonary/Chest: Effort normal. No respiratory distress.   Abdominal: Normal appearance.   Musculoskeletal: Normal range of motion.         General: Normal range of motion.   Neurological: He is alert and oriented to person, place, and time.   Skin: Skin is warm, dry, intact and no rash. lesion No abrasion, No burn, No bruising, No erythema and No ecchymosis        Psychiatric: His speech is normal and behavior is normal. Mood, judgment and thought content normal.   Nursing note and vitals reviewed.    Laceration Repair    Date/Time: 12/13/2023 4:15 PM    Performed by: Irvin Rm NP  Authorized by: Irvin Rm NP  Body area: lower extremity  Location details: left lower leg  Laceration length: 0.5 cm  Amount of cleaning: standard  Skin closure: staples  Number of sutures: 2  Approximation: close  Patient tolerance: Patient tolerated the procedure well with no immediate complications  Comments: Active, uncontrolled bleeding to previous incision site.  2 staples placed, bleeding controlled.  Covered with nonadherent dressing, and Ace wrap.          Assessment:     1. Hematoma of leg, left, subsequent encounter    2. Bleeding from wound        Plan:       Hematoma of leg, left, subsequent encounter  -     acetaminophen tablet 650 mg    Bleeding from wound  -     Laceration Repair      - 2 staples placed to incision site to control bleeding.  Wound covered with nonadherent dressing,  compressed with Ace wrap.  If bleeding reoccurs through bandage, recommend ER.  Leave bandage on for 24 hours.  Elevate leg at home.  Can loosen Ace wrap as necessary.  Follow-up with PCP.  Patient and wife both verbalized understanding, and both in agreement with plan.    Patient Instructions   - Follow up with your PCP or specialty clinic as directed in the next 1-2 weeks if not improved or as needed.  You can call [phone] to schedule an appointment with the appropriate provider.    - Go to the ER or seek medical attention immediately if you develop new or worsening symptoms.    - You must understand that you have received an Urgent Care treatment only and that you may be released before all of your medical problems are known or treated.   - You, the patient, will arrange for follow up care as instructed.   - If your condition worsens or fails to improve we recommend that you receive another evaluation at the ER immediately or contact your PCP to discuss your concerns or return here.       Staples should be removed in 7-10 days.  If bleeding continues, recommend ER evaluation.  Monitor for signs and symptoms of infection.  Follow-up with PCP.

## 2023-12-13 NOTE — PATIENT INSTRUCTIONS
- Follow up with your PCP or specialty clinic as directed in the next 1-2 weeks if not improved or as needed.  You can call [phone] to schedule an appointment with the appropriate provider.    - Go to the ER or seek medical attention immediately if you develop new or worsening symptoms.    - You must understand that you have received an Urgent Care treatment only and that you may be released before all of your medical problems are known or treated.   - You, the patient, will arrange for follow up care as instructed.   - If your condition worsens or fails to improve we recommend that you receive another evaluation at the ER immediately or contact your PCP to discuss your concerns or return here.       Staples should be removed in 7-10 days.  If bleeding continues, recommend ER evaluation.  Monitor for signs and symptoms of infection.  Follow-up with PCP.

## 2023-12-21 ENCOUNTER — CLINICAL SUPPORT (OUTPATIENT)
Dept: URGENT CARE | Facility: CLINIC | Age: 68
End: 2023-12-21
Payer: MEDICARE

## 2023-12-21 VITALS
BODY MASS INDEX: 26.52 KG/M2 | DIASTOLIC BLOOD PRESSURE: 60 MMHG | OXYGEN SATURATION: 96 % | WEIGHT: 175 LBS | TEMPERATURE: 98 F | RESPIRATION RATE: 18 BRPM | HEART RATE: 91 BPM | HEIGHT: 68 IN | SYSTOLIC BLOOD PRESSURE: 96 MMHG

## 2023-12-21 DIAGNOSIS — Z48.02 ENCOUNTER FOR STAPLE REMOVAL: Primary | ICD-10-CM

## 2023-12-21 PROCEDURE — 99024 POSTOP FOLLOW-UP VISIT: CPT | Mod: S$GLB,,, | Performed by: FAMILY MEDICINE

## 2023-12-21 PROCEDURE — 99024 STAPLE REMOVAL: ICD-10-PCS | Mod: S$GLB,,, | Performed by: FAMILY MEDICINE

## 2023-12-21 PROCEDURE — 99499 NO LOS: ICD-10-PCS | Mod: S$GLB,,, | Performed by: FAMILY MEDICINE

## 2023-12-21 PROCEDURE — 99499 UNLISTED E&M SERVICE: CPT | Mod: S$GLB,,, | Performed by: FAMILY MEDICINE

## 2023-12-21 NOTE — PROCEDURES
Staple Removal    Date/Time: 12/21/2023 10:15 AM  Location procedure was performed: [facility]    Performed by: Dannielle Jimenez MD  Authorized by: Dannielle Jimenez MD  Pre-operative diagnosis: laceration  Post-operative diagnosis: laceration  Location: right anterior ankle.  Wound Appearance: clean, well healed, warm and no drainage  Staples Removed: 2  Post-removal: bandaid applied  Complications: No  Estimated blood loss (mL): 0  Specimens: No  Implants: No  Patient tolerance: Patient tolerated the procedure well with no immediate complications

## 2023-12-21 NOTE — PROGRESS NOTES
"Subjective:      Patient ID: Miguel Moore is a 68 y.o. male.    Vitals:  height is 5' 8" (1.727 m) and weight is 79.4 kg (175 lb). His oral temperature is 98.4 °F (36.9 °C). His blood pressure is 96/60 and his pulse is 91. His respiration is 18 and oxygen saturation is 96%.     Chief Complaint: Suture / Staple Removal    Pt is here for staple removal. No pain or discomfort     Suture / Staple Removal  The sutures were placed 7 to 10 days ago. He tried antibiotic ointment use since the wound repair. The treatment provided no relief. There has been no drainage from the wound. There is no redness present. There is no swelling present. There is no pain present.     ROS   Objective:     Physical Exam   Constitutional: He is oriented to person, place, and time.   HENT:   Head: Normocephalic.   Ears:   Right Ear: External ear normal.   Left Ear: External ear normal.   Nose: Nose normal.   Mouth/Throat: Mucous membranes are moist.   Eyes: Conjunctivae are normal.   Cardiovascular: Normal rate.   Pulmonary/Chest: Effort normal.   Musculoskeletal: Normal range of motion.         General: Normal range of motion.   Neurological: He is alert and oriented to person, place, and time.   Skin: Skin is dry.         Comments: Right lower shin, some chronic edema, but the laceration has healed well. No signs of infection. Two staples in place.    Psychiatric: His behavior is normal.       Assessment:     No diagnosis found.    Plan:       There are no diagnoses linked to this encounter.                "

## 2023-12-29 ENCOUNTER — TELEPHONE (OUTPATIENT)
Dept: VASCULAR SURGERY | Facility: CLINIC | Age: 68
End: 2023-12-29
Payer: MEDICARE

## 2023-12-29 ENCOUNTER — TELEPHONE (OUTPATIENT)
Dept: UROLOGY | Facility: CLINIC | Age: 68
End: 2023-12-29
Payer: MEDICARE

## 2023-12-29 NOTE — TELEPHONE ENCOUNTER
----- Message from Deepti Vera MA sent at 12/28/2023  1:39 PM CST -----    ----- Message -----  From: Dominique Rodríguez  Sent: 12/28/2023  12:58 PM CST  To: [facility] Staff    Type:  Patient Returning Call    Who Called: pt   Who Left Message for Patient: pt   Does the patient know what this is regarding?: pt need a new referral in for a MRI he want make it in today   Would the patient rather a call back or a response via [facility]?  call  Best Call Back Number:[phone]  Additional Information:  call back

## 2024-01-09 ENCOUNTER — TELEPHONE (OUTPATIENT)
Dept: UROLOGY | Facility: CLINIC | Age: 69
End: 2024-01-09
Payer: MEDICARE

## 2024-01-09 NOTE — TELEPHONE ENCOUNTER
----- Message from Jaxson Bautista sent at 1/9/2024 10:22 AM CST -----  Type: Patient Call Back    Who called:SELF    What is the request in detail:iN REGARDS TO POWER OUTAGE, PT WONT BE ABLE TO TAKE TEST    Can the clinic reply by [facility]?NO    Would the patient rather a call back or a response via [facility]? CALL    Best call back number:.[phone] (home)       Additional Information:

## 2024-01-18 ENCOUNTER — OFFICE VISIT (OUTPATIENT)
Dept: FAMILY MEDICINE | Facility: CLINIC | Age: 69
End: 2024-01-18
Payer: MEDICARE

## 2024-01-18 VITALS
HEART RATE: 76 BPM | HEIGHT: 68 IN | WEIGHT: 178.56 LBS | BODY MASS INDEX: 27.06 KG/M2 | OXYGEN SATURATION: 96 % | DIASTOLIC BLOOD PRESSURE: 62 MMHG | SYSTOLIC BLOOD PRESSURE: 100 MMHG | TEMPERATURE: 99 F

## 2024-01-18 DIAGNOSIS — E11.22 CONTROLLED TYPE 2 DIABETES MELLITUS WITH CHRONIC KIDNEY DISEASE ON CHRONIC DIALYSIS, WITH LONG-TERM CURRENT USE OF INSULIN: ICD-10-CM

## 2024-01-18 DIAGNOSIS — T14.8XXA HEMATOMA: Primary | ICD-10-CM

## 2024-01-18 DIAGNOSIS — L60.9 NAIL ABNORMALITY: ICD-10-CM

## 2024-01-18 DIAGNOSIS — Z99.2 CONTROLLED TYPE 2 DIABETES MELLITUS WITH CHRONIC KIDNEY DISEASE ON CHRONIC DIALYSIS, WITH LONG-TERM CURRENT USE OF INSULIN: ICD-10-CM

## 2024-01-18 DIAGNOSIS — Z79.4 CONTROLLED TYPE 2 DIABETES MELLITUS WITH CHRONIC KIDNEY DISEASE ON CHRONIC DIALYSIS, WITH LONG-TERM CURRENT USE OF INSULIN: ICD-10-CM

## 2024-01-18 DIAGNOSIS — N18.6 ESRD (END STAGE RENAL DISEASE): ICD-10-CM

## 2024-01-18 DIAGNOSIS — N18.6 CONTROLLED TYPE 2 DIABETES MELLITUS WITH CHRONIC KIDNEY DISEASE ON CHRONIC DIALYSIS, WITH LONG-TERM CURRENT USE OF INSULIN: ICD-10-CM

## 2024-01-18 PROCEDURE — 3074F SYST BP LT 130 MM HG: CPT | Mod: HCNC,CPTII,S$GLB,

## 2024-01-18 PROCEDURE — 1159F MED LIST DOCD IN RCRD: CPT | Mod: HCNC,CPTII,S$GLB,

## 2024-01-18 PROCEDURE — 3072F LOW RISK FOR RETINOPATHY: CPT | Mod: HCNC,CPTII,S$GLB,

## 2024-01-18 PROCEDURE — 1101F PT FALLS ASSESS-DOCD LE1/YR: CPT | Mod: HCNC,CPTII,S$GLB,

## 2024-01-18 PROCEDURE — 3078F DIAST BP <80 MM HG: CPT | Mod: HCNC,CPTII,S$GLB,

## 2024-01-18 PROCEDURE — 99999 PR PBB SHADOW E&M-EST. PATIENT-LVL V: CPT | Mod: PBBFAC,HCNC,,

## 2024-01-18 PROCEDURE — 3288F FALL RISK ASSESSMENT DOCD: CPT | Mod: HCNC,CPTII,S$GLB,

## 2024-01-18 PROCEDURE — 3008F BODY MASS INDEX DOCD: CPT | Mod: HCNC,CPTII,S$GLB,

## 2024-01-18 PROCEDURE — 99214 OFFICE O/P EST MOD 30 MIN: CPT | Mod: HCNC,S$GLB,,

## 2024-01-18 RX ORDER — MUPIROCIN 20 MG/G
OINTMENT TOPICAL
Qty: 22 G | Refills: 1 | Status: SHIPPED | OUTPATIENT
Start: 2024-01-18

## 2024-01-18 NOTE — PROGRESS NOTES
HPI     Chief Complaint:  Chief Complaint   Patient presents with    hematoma       Miguel Moore is a 68 y.o. male with multiple medical diagnoses as listed in the medical history and problem list that presents for hematoma.  Pt is not known to me with his last appointment 10/26/2023.      Mass  This is a new problem. The current episode started 1 to 4 weeks ago (started 2 weeks ago). Progression since onset: seen in urgent care about 2-3 weeks ago and lanced lesion which helped. Associated symptoms include joint swelling. Pertinent negatives include no chills or fever. Treatments tried: mupirocin. The treatment provided mild relief.   Mupirocin and draining helped but lesion is back.         Assessment & Plan     Problem List Items Addressed This Visit          Renal/    ESRD (end stage renal disease)  Stable. On hemodialysis, followed by nephrology.    Overview     2/27/20 renal US with dopplers no ALF.  Medical renal disease  8/2020 renal US: 1. Symmetric diffusely increased cortical echogenicity and elevated resistive indices, nonspecific indicators of chronic medical renal disease.  2. Prostatomegaly.  Some of the provided images are suggestive of a distinct hyperechoic component of the prostate gland, of uncertain etiology or significance, and potentially artifactual.  Dedicated prostate ultrasound or MRI may be of benefit for further characterization.    Started on HD while hospitalized for progression to ESRD 8/2020  -Continue dialysis per nephrology  -Outpatient HD has been arranged  -Had planned discharge 8/27 if remained afebrile and cultures negative.  Unfortunately his blood culture grew Candida parapsilosis  -Dr. Gomez with ID consulted and case discussed with him.  Started micafungin 8/27 and now on fluconazole.  -Dialysis line removed after HD 8/28 and plan line holiday over weekend.  -new line by IR on 8/31, tolerated dialysis fluid.  -continue outpatient dialysis.          Other Visit  Diagnoses       Hematoma    -  Primary  Hematoma to right lower leg.  Seen in urgent care and hematoma was drained patient sent home with mupirocin ointment.  Hematoma has grown back see physical exam for picture.  We will refer to general surgery for evaluation.    Relevant Orders    Ambulatory referral/consult to General Surgery    Nail abnormality      Nail abnormality related to opening a soda can.  Has applied mupirocin ointment which has helped some.  See picture in physical exam.  We will refill ointment.    Relevant Medications    mupirocin (BACTROBAN) 2 % ointment              --------------------------------------------      Health Maintenance:  Health Maintenance         Date Due Completion Date    RSV Vaccine (Age 60+ and Pregnant patients) (1 - 1-dose 60+ series) Never done ---    Shingles Vaccine (2 of 2) 09/25/2020 7/31/2020    COVID-19 Vaccine (7 - 2023-24 season) 09/01/2023 11/10/2021    Foot Exam 02/14/2024 2/14/2023 (Done)    Override on 2/14/2023: Done    Override on 12/7/2021: Done    Override on 1/9/2020: Done    Override on 4/24/2019: Done    Override on 1/15/2018: Done    Override on 4/3/2017: Done (Gabriella Manjarrez/Podiatry)    Hemoglobin A1c 04/26/2024 10/26/2023    Lipid Panel 04/27/2024 4/27/2023    Eye Exam 05/02/2024 5/2/2023    Colorectal Cancer Screening 10/26/2024 10/26/2023    TETANUS VACCINE 05/08/2027 5/8/2017            Health maintenance reviewed    Follow Up:  No follow-ups on file.    Exam     Review of Systems:  (as noted above)  Review of Systems   Constitutional:  Negative for chills and fever.   Musculoskeletal:  Positive for joint swelling.   Skin:  Positive for color change.        hematoma       Physical Exam:   Physical Exam  Constitutional:       General: He is not in acute distress.     Appearance: He is not ill-appearing or diaphoretic.      Comments: Ambulates with wheelchair   HENT:      Head: Normocephalic and atraumatic.   Eyes:      General: No scleral  "icterus.  Cardiovascular:      Rate and Rhythm: Normal rate and regular rhythm.      Pulses: Normal pulses.      Heart sounds: No murmur heard.     No friction rub. No gallop.   Pulmonary:      Effort: No respiratory distress.   Chest:      Chest wall: No tenderness.   Musculoskeletal:      Cervical back: No rigidity.   Neurological:      Mental Status: He is alert and oriented to person, place, and time.         Vitals:    01/18/24 1140   BP: 100/62   Pulse: 76   Temp: 98.6 °F (37 °C)   TempSrc: Oral   SpO2: 96%   Weight: 81 kg (178 lb 9.2 oz)   Height: 5' 8" (1.727 m)      Body mass index is 27.15 kg/m².        History     Past Medical History:  Past Medical History:   Diagnosis Date    Allergy     Clotting disorder     Elaquis and APlavix    Deep vein thrombosis     Diabetes mellitus, type 2     Hypertension     Nuclear sclerosis of both eyes 6/9/2017    Renal disorder     Seizures     Stroke     Urinary tract infection        Past Surgical History:  Past Surgical History:   Procedure Laterality Date    CATARACT EXTRACTION W/  INTRAOCULAR LENS IMPLANT Right 10/05/2017    Dr. Tay    CATARACT EXTRACTION W/  INTRAOCULAR LENS IMPLANT Left 10/19/2017    Dr. Tay    CYSTOSCOPY W/ RETROGRADES Bilateral 2/18/2021    Procedure: CYSTOSCOPY, WITH RETROGRADE PYELOGRAM;  Surgeon: JEMMA Styles MD;  Location: [facility] OR;  Service: Urology;  Laterality: Bilateral;  REQUESTING EARLY AS POSSIBE-LO / 2/8/2021 @ 1:13PM  RN Pre Op 2-11-21, Covid NEGATIVE ON  2-17-21.  C A    ESOPHAGOGASTRODUODENOSCOPY N/A 8/17/2020    Procedure: EGD (ESOPHAGOGASTRODUODENOSCOPY);  Surgeon: Desmond Chapman MD;  Location: [facility] ENDO;  Service: Endoscopy;  Laterality: N/A;    EYE SURGERY Bilateral     cataract    FEMORAL ARTERY STENT      FRACTURE SURGERY      KNEE SURGERY      bilateral scope       Social History:  Social History     Socioeconomic History    Marital status: Single   Occupational History    Occupation: " disabled - former  - dozers, etc   Tobacco Use    Smoking status: Never    Smokeless tobacco: Never   Substance and Sexual Activity    Alcohol use: No    Drug use: No   Social History Narrative    Lives with daughter       Family History:  Family History   Problem Relation Age of Onset    Diabetes Mother     Heart disease Mother     Hypertension Mother     Cataracts Mother     No Known Problems Father     Hypertension Sister     No Known Problems Brother     No Known Problems Maternal Aunt     No Known Problems Maternal Uncle     No Known Problems Paternal Aunt     No Known Problems Paternal Uncle     No Known Problems Maternal Grandmother     No Known Problems Maternal Grandfather     No Known Problems Paternal Grandmother     No Known Problems Paternal Grandfather     No Known Problems Daughter     No Known Problems Other     Amblyopia Neg Hx     Blindness Neg Hx     Cancer Neg Hx     Glaucoma Neg Hx     Macular degeneration Neg Hx     Retinal detachment Neg Hx     Strabismus Neg Hx     Stroke Neg Hx     Thyroid disease Neg Hx        Allergies and Medications: (updated and reviewed)  Review of patient's allergies indicates:   Allergen Reactions    Ace inhibitors      Hyperkalemia 8/2018  Other reaction(s): Unknown    Penicillins Hives    Tizanidine      [place] hot     Current Outpatient Medications   Medication Sig Dispense Refill    amLODIPine (NORVASC) 10 MG tablet Take 1 tablet by mouth once daily 90 tablet 3    apixaban (ELIQUIS) 5 mg Tab Take 1 tablet (5 mg total) by mouth 2 (two) times daily. 180 tablet 3    aspirin (ECOTRIN) 81 MG EC tablet Take 1 tablet (81 mg total) by mouth once daily. 90 tablet 3    atorvastatin (LIPITOR) 80 MG tablet Take 1 tablet (80 mg total) by mouth once daily. 90 tablet 3    blood sugar diagnostic Strp To check BG 1 times daily, to use with insurance preferred meter 100 strip 3    blood-glucose meter kit To check BG 1 times  daily, to use with insurance preferred meter 1 each 0    CALCITRIOL ORAL Take 1.25 mcg by mouth 3 (three) times a week.      cholecalciferol, vitamin D3, (VITAMIN D3) 25 mcg (1,000 unit) capsule Take 1 capsule (1,000 Units total) by mouth once daily. 90 capsule 3    clotrimazole-betamethasone 1-0.05% (LOTRISONE) cream Apply topically 2 (two) times daily. 15 g 0    finasteride (PROSCAR) 5 mg tablet Take 1 tablet (5 mg total) by mouth once daily. 90 tablet 3    hydrocortisone (WESTCORT) 0.2 % cream Apply topically 2 (two) times daily. 15 g 0    labetaloL (NORMODYNE) 100 MG tablet Take 1 tablet (100 mg total) by mouth once daily. 90 tablet 3    lancets Misc To check BG 1 times daily, to use with insurance preferred meter 100 each 4    RENVELA 800 mg Tab Take 1 tablet (800 mg total) by mouth 3 (three) times daily with meals. 90 tablet 0    tamsulosin (FLOMAX) 0.4 mg Cap Take 2 capsules (0.8 mg total) by mouth once daily. 180 capsule 3    levETIRAcetam (KEPPRA) 250 MG Tab Take 1 tablet (250 mg total) by mouth 2 (two) times daily on Tuesday, Thursday, Saturday & Sunday. Take 2 tablets (500mg total) by mouth in the morning and 1 tablet (250mg total) in the evening on dialysis days (Monday, Wednesday, & Friday) 80 tablet 11    mupirocin (BACTROBAN) 2 % ointment Apply to affected area 3 times daily 22 g 1     No current facility-administered medications for this visit.       Patient Care Team:  Raciel Raymond MD as PCP - General (Internal Medicine)  Gabriella Manjarrez DPM as Consulting Physician (Podiatry)  Mac Chambers Jr., MD as Consulting Physician (Urology)  Geovany Rucker MD as Consulting Physician (Neurology)  Bob Tay MD as Consulting Physician (Ophthalmology)  Adrian Millard MD as Consulting Physician (Nephrology)  Nikhil Martino MA as Care Coordinator         - The patient is given an After Visit Summary that lists all medications with directions, allergies, education,  orders placed during this encounter and follow-up instructions.      - I have reviewed the patient's medical information including past medical, family, and social history sections including the medications and allergies.      - We discussed the patient's current medications.     This note was created by combination of typed  and MModal dictation.  Transcription errors may be present.  If there are any questions, please contact me.       Lucinda Jimenez NP

## 2024-01-23 ENCOUNTER — OFFICE VISIT (OUTPATIENT)
Dept: SURGERY | Facility: CLINIC | Age: 69
End: 2024-01-23
Payer: MEDICARE

## 2024-01-23 VITALS
SYSTOLIC BLOOD PRESSURE: 129 MMHG | DIASTOLIC BLOOD PRESSURE: 82 MMHG | HEART RATE: 85 BPM | WEIGHT: 172.94 LBS | BODY MASS INDEX: 26.21 KG/M2 | HEIGHT: 68 IN

## 2024-01-23 DIAGNOSIS — T14.8XXA HEMATOMA: ICD-10-CM

## 2024-01-23 PROCEDURE — 3072F LOW RISK FOR RETINOPATHY: CPT | Mod: HCNC,CPTII,S$GLB, | Performed by: SURGERY

## 2024-01-23 PROCEDURE — 99203 OFFICE O/P NEW LOW 30 MIN: CPT | Mod: HCNC,S$GLB,, | Performed by: SURGERY

## 2024-01-23 PROCEDURE — 3008F BODY MASS INDEX DOCD: CPT | Mod: HCNC,CPTII,S$GLB, | Performed by: SURGERY

## 2024-01-23 PROCEDURE — 1126F AMNT PAIN NOTED NONE PRSNT: CPT | Mod: HCNC,CPTII,S$GLB, | Performed by: SURGERY

## 2024-01-23 PROCEDURE — 3288F FALL RISK ASSESSMENT DOCD: CPT | Mod: HCNC,CPTII,S$GLB, | Performed by: SURGERY

## 2024-01-23 PROCEDURE — 1159F MED LIST DOCD IN RCRD: CPT | Mod: HCNC,CPTII,S$GLB, | Performed by: SURGERY

## 2024-01-23 PROCEDURE — 3074F SYST BP LT 130 MM HG: CPT | Mod: HCNC,CPTII,S$GLB, | Performed by: SURGERY

## 2024-01-23 PROCEDURE — 3079F DIAST BP 80-89 MM HG: CPT | Mod: HCNC,CPTII,S$GLB, | Performed by: SURGERY

## 2024-01-23 PROCEDURE — 1157F ADVNC CARE PLAN IN RCRD: CPT | Mod: HCNC,CPTII,S$GLB, | Performed by: SURGERY

## 2024-01-23 PROCEDURE — 1101F PT FALLS ASSESS-DOCD LE1/YR: CPT | Mod: HCNC,CPTII,S$GLB, | Performed by: SURGERY

## 2024-01-23 PROCEDURE — 99999 PR PBB SHADOW E&M-EST. PATIENT-LVL III: CPT | Mod: PBBFAC,HCNC,, | Performed by: SURGERY

## 2024-01-23 NOTE — H&P
History & Physical    SUBJECTIVE:     History of Present Illness:  Patient is a 68 y.o. male presents with left shin hematoma. No recollection of trauma. He is on blood thinners. He reports that the ED drained it, and dark thick blood came out. Now he feels it has recurred.  Not painful. No fevers. No drainage    No chief complaint on file.      Review of patient's allergies indicates:   Allergen Reactions    Ace inhibitors      Hyperkalemia 8/2018  Other reaction(s): Unknown    Penicillins Hives    Tizanidine      Felt hot       Current Outpatient Medications   Medication Sig Dispense Refill    amLODIPine (NORVASC) 10 MG tablet Take 1 tablet by mouth once daily 90 tablet 3    apixaban (ELIQUIS) 5 mg Tab Take 1 tablet (5 mg total) by mouth 2 (two) times daily. 180 tablet 3    aspirin (ECOTRIN) 81 MG EC tablet Take 1 tablet (81 mg total) by mouth once daily. 90 tablet 3    atorvastatin (LIPITOR) 80 MG tablet Take 1 tablet (80 mg total) by mouth once daily. 90 tablet 3    blood sugar diagnostic Strp To check BG 1 times daily, to use with insurance preferred meter 100 strip 3    blood-glucose meter kit To check BG 1 times daily, to use with insurance preferred meter 1 each 0    cholecalciferol, vitamin D3, (VITAMIN D3) 25 mcg (1,000 unit) capsule Take 1 capsule (1,000 Units total) by mouth once daily. 90 capsule 3    clotrimazole-betamethasone 1-0.05% (LOTRISONE) cream Apply topically 2 (two) times daily. 15 g 0    finasteride (PROSCAR) 5 mg tablet Take 1 tablet (5 mg total) by mouth once daily. 90 tablet 3    hydrocortisone (WESTCORT) 0.2 % cream Apply topically 2 (two) times daily. 15 g 0    labetaloL (NORMODYNE) 100 MG tablet Take 1 tablet (100 mg total) by mouth once daily. 90 tablet 3    lancets Misc To check BG 1 times daily, to use with insurance preferred meter 100 each 4    mupirocin (BACTROBAN) 2 % ointment Apply to affected area 3 times daily 22 g 1    RENVELA 800 mg Tab Take 1 tablet (800 mg total) by mouth  3 (three) times daily with meals. 90 tablet 0    tamsulosin (FLOMAX) 0.4 mg Cap Take 2 capsules (0.8 mg total) by mouth once daily. 180 capsule 3    levETIRAcetam (KEPPRA) 250 MG Tab Take 1 tablet (250 mg total) by mouth 2 (two) times daily on Tuesday, Thursday, Saturday & Sunday. Take 2 tablets (500mg total) by mouth in the morning and 1 tablet (250mg total) in the evening on dialysis days (Monday, Wednesday, & Friday) 80 tablet 11     No current facility-administered medications for this visit.       Past Medical History:   Diagnosis Date    Allergy     Clotting disorder     Elaquis and APlavix    Deep vein thrombosis     Diabetes mellitus, type 2     Hypertension     Nuclear sclerosis of both eyes 6/9/2017    Renal disorder     Seizures     Stroke     Urinary tract infection      Past Surgical History:   Procedure Laterality Date    CATARACT EXTRACTION W/  INTRAOCULAR LENS IMPLANT Right 10/05/2017    Dr. Tay    CATARACT EXTRACTION W/  INTRAOCULAR LENS IMPLANT Left 10/19/2017    Dr. Tay    CYSTOSCOPY W/ RETROGRADES Bilateral 2/18/2021    Procedure: CYSTOSCOPY, WITH RETROGRADE PYELOGRAM;  Surgeon: JEMMA Styles MD;  Location: [facility] OR;  Service: Urology;  Laterality: Bilateral;  REQUESTING EARLY AS POSSIBE-LO / 2/8/2021 @ 1:13PM  RN Pre Op 2-11-21, Covid NEGATIVE ON  2-17-21.  C A    ESOPHAGOGASTRODUODENOSCOPY N/A 8/17/2020    Procedure: EGD (ESOPHAGOGASTRODUODENOSCOPY);  Surgeon: Desmond Chapman MD;  Location: [facility] ENDO;  Service: Endoscopy;  Laterality: N/A;    EYE SURGERY Bilateral     cataract    FEMORAL ARTERY STENT      FRACTURE SURGERY      KNEE SURGERY      bilateral scope     Family History   Problem Relation Age of Onset    Diabetes Mother     Heart disease Mother     Hypertension Mother     Cataracts Mother     No Known Problems Father     Hypertension Sister     No Known Problems Brother     No Known Problems Maternal Aunt     No Known Problems Maternal Uncle     No Known Problems  "Paternal Aunt     No Known Problems Paternal Uncle     No Known Problems Maternal Grandmother     No Known Problems Maternal Grandfather     No Known Problems Paternal Grandmother     No Known Problems Paternal Grandfather     No Known Problems Daughter     No Known Problems Other     Amblyopia Neg Hx     Blindness Neg Hx     Cancer Neg Hx     Glaucoma Neg Hx     Macular degeneration Neg Hx     Retinal detachment Neg Hx     Strabismus Neg Hx     Stroke Neg Hx     Thyroid disease Neg Hx      Social History     Tobacco Use    Smoking status: Never    Smokeless tobacco: Never   Substance Use Topics    Alcohol use: No    Drug use: No        Review of Systems:  Review of Systems   Constitutional:  Negative for chills and fever.   HENT: Negative.     Eyes: Negative.    Respiratory:  Negative for cough, chest tightness and shortness of breath.    Cardiovascular: Negative.    Gastrointestinal:  Negative for abdominal pain, blood in stool, constipation, diarrhea, nausea and vomiting.   Endocrine: Negative for cold intolerance and heat intolerance.   Genitourinary: Negative.    Musculoskeletal: Negative.    Skin: Negative.  Negative for rash.   Neurological:  Negative for dizziness, syncope and light-headedness.   Psychiatric/Behavioral:  Negative for agitation, confusion and hallucinations.        OBJECTIVE:     Vital Signs (Most Recent)  Pulse: 85 (01/23/24 1311)  BP: 129/82 (01/23/24 1311)  5' 8" (1.727 m)  78.4 kg (172 lb 15.2 oz)     Physical Exam:  Physical Exam  Constitutional:       General: He is not in acute distress.     Appearance: He is well-developed. He is not diaphoretic.   HENT:      Head: Normocephalic and atraumatic.   Eyes:      Conjunctiva/sclera: Conjunctivae normal.      Pupils: Pupils are equal, round, and reactive to light.   Cardiovascular:      Rate and Rhythm: Normal rate and regular rhythm.      Pulses: Normal pulses.      Heart sounds: Normal heart sounds.   Pulmonary:      Effort: Pulmonary " effort is normal. No respiratory distress.      Breath sounds: Normal breath sounds. No stridor. No wheezing.   Abdominal:      General: Bowel sounds are normal. There is no distension.      Palpations: Abdomen is soft.      Tenderness: There is no abdominal tenderness.   Musculoskeletal:         General: Normal range of motion.      Cervical back: Normal range of motion and neck supple.        Legs:       Comments: Soft small hematoma, no cellulitis non tender.   Skin:     General: Skin is warm and dry.      Findings: No rash.   Neurological:      Mental Status: He is alert and oriented to person, place, and time.      Cranial Nerves: No cranial nerve deficit.   Psychiatric:         Behavior: Behavior normal.         ASSESSMENT/PLAN:     Past Medical History:   Diagnosis Date    Allergy     Clotting disorder     Elaquis and APlavix    Deep vein thrombosis     Diabetes mellitus, type 2     Hypertension     Nuclear sclerosis of both eyes 6/9/2017    Renal disorder     Seizures     Stroke     Urinary tract infection        68 yr old black man w DVT on eliquis w DM2, HTN, stroke, and a leg hematoma    PLAN:Plan     No indication for drainage. It would likely recur as it has done once already.  Non tender, no infection, not expanding. Recommend observation. Rest, Ice, Compress, elevation  Rtc prn. Call with changes or questions

## 2024-01-25 ENCOUNTER — HOSPITAL ENCOUNTER (OUTPATIENT)
Dept: RADIOLOGY | Facility: HOSPITAL | Age: 69
Discharge: HOME | End: 2024-01-25
Attending: UROLOGY
Payer: MEDICARE

## 2024-01-25 DIAGNOSIS — R97.20 ELEVATED PSA: ICD-10-CM

## 2024-01-25 PROCEDURE — 72195 MRI PELVIS W/O DYE: CPT | Mod: TC,HCNC

## 2024-01-25 PROCEDURE — 72195 MRI PELVIS W/O DYE: CPT | Mod: 26,HCNC,, | Performed by: STUDENT IN AN ORGANIZED HEALTH CARE EDUCATION/TRAINING PROGRAM

## 2024-01-26 ENCOUNTER — TELEPHONE (OUTPATIENT)
Dept: FAMILY MEDICINE | Facility: CLINIC | Age: 69
End: 2024-01-26
Payer: MEDICARE

## 2024-01-26 NOTE — TELEPHONE ENCOUNTER
----- Message from Herve Goodman sent at 1/26/2024  8:05 AM CST -----  Regarding: Self .[phone]  Who Called: Self     Symptoms (please be specific):  left index finger turning colors from a hangnail     How long has patient had these symptoms:   a couple weeks     Pharmacy: .  [facility] [address] [place] [address] [place] [address]  [address] [facility] [place] [address]  Phone: [phone] Fax: [phone]        Would the patient rather a call back or a response via My [facility]?  Call back     Best Call Back Number:  [phone]      Additional Information: Pt would like to be seen asap no soon appts in office, Pt has Dialysis on MWF

## 2024-01-26 NOTE — TELEPHONE ENCOUNTER
"Spoke with patient in reference to his left index finger fingernail is missing. Questioned patient how did his fingernail became missing? He states "he was trying to open up a soda can". Patient questioned whether or not his wound was draining? Patient states "No, it was just turning colors". Patient instructed to go to the Urgent Care, or Emergency Room due to No sooner availability for provider. Patient refused. Appointment set up with Taylor oNble NP for 2/6/24.  "

## 2024-01-31 NOTE — ASSESSMENT & PLAN NOTE
Problem: SAFETY ADULT  Goal: Maintain or return to baseline ADL function  Description: INTERVENTIONS:  -  Assess patient's ability to carry out ADLs; assess patient's baseline for ADL function and identify physical deficits which impact ability to perform ADLs (bathing, care of mouth/teeth, toileting, grooming, dressing, etc.)  - Assess/evaluate cause of self-care deficits   - Assess range of motion  - Assess patient's mobility; develop plan if impaired  - Assess patient's need for assistive devices and provide as appropriate  - Encourage maximum independence but intervene and supervise when necessary  - Involve family in performance of ADLs  - Assess for home care needs following discharge   - Consider OT consult to assist with ADL evaluation and planning for discharge  - Provide patient education as appropriate  Outcome: Not Progressing  Goal: Maintains/Returns to pre admission functional level  Description: INTERVENTIONS:  - Perform AM-PAC 6 Click Basic Mobility/ Daily Activity assessment daily.  - Set and communicate daily mobility goal to care team and patient/family/caregiver.   - Collaborate with rehabilitation services on mobility goals if co  Problem: BEHAVIOR  Goal: Pt/Family maintain appropriate behavior and adhere to behavioral management agreement, if implemented  Description: INTERVENTIONS:  - Assess the family dynamic   - Encourage verbalization of thoughts and concerns in a socially appropriate manner  - Assess patient/family's coping skills and non-compliant behavior (including use of illegal substances).  - Utilize positive, consistent limit setting strategies supporting safety of patient, staff and others  - Initiate consult with Case Management, Spiritual Care or other ancillary services as appropriate  - If a patient's/visitor's behavior jeopardizes the safety of the patient, staff, or others, refer to organization procedure.   - Notify Security of behavior or suspected illegal substances  Patient with profound edema - anasarca up to abdomen - 2-3+ pitting after overnight diuresis + 3+ proteinuria - CKD stage 4 worsened from creatine baseline up to creatine of around 3-->5.8, severe activity intolerance - Nephrology consulted and following - diureses, strict I&Os, daily weight  -stop amlodipine - added labetalol per nephrology.  Also started on Hydralazine.  Increasing Creat with persistent hyperkalemia.  HD catheter placed and patient underwent HD on 8/20.  Will discuss need to continue IV Lasix with Nephrology.  Will need outpatient HD arrangements.  SW consulted.   which indicate the need for search of the patient and/or belongings  - Encourage participation in the decision making process about a behavioral management agreement; implement if patient meets criteria  Outcome: Progressing     Problem: DISCHARGE PLANNING - CARE MANAGEMENT  Goal: Discharge to post-acute care or home with appropriate resources  Description: INTERVENTIONS:  - Conduct assessment to determine patient/family and health care team treatment goals, and need for post-acute services based on payer coverage, community resources, and patient preferences, and barriers to discharge  - Address psychosocial, clinical, and financial barriers to discharge as identified in assessment in conjunction with the patient/family and health care team  - Arrange appropriate level of post-acute services according to patient’s   needs and preference and payer coverage in collaboration with the physician and health care team  - Communicate with and update the patient/family, physician, and health care team regarding progress on the discharge plan  - Arrange appropriate transportation to post-acute venues  Outcome: Progressing     Problem: Alteration in Thoughts and Perception  Goal: Verbalize thoughts and feelings  Description: Interventions:  - Promote a nonjudgmental and trusting relationship with the patient through active listening and therapeutic communication  - Assess patient's level of functioning, behavior and potential for risk  - Engage patient in 1 on 1 interactions  - Encourage patient to express fears, feelings, frustrations, and discuss symptoms    - [place] patient to reality, help patient recognize reality-based thinking   - Administer medications as ordered and assess for potential side effects  - Provide the patient education related to the signs and symptoms of the illness and desired effects of prescribed medications  Outcome: Not Progressing  Goal: Agree to be compliant with medication regime, as prescribed  and report medication side effects  Description: Interventions:  - Offer appropriate PRN medication and supervise ingestion; conduct AIMS, as needed   Outcome: Progressing  Goal: Attend and participate in unit activities, including therapeutic, recreational, and educational groups  Description: Interventions:  -Encourage Visitation and family involvement in care  Outcome: Progressing  Goal: Recognize dysfunctional thoughts, communicate reality-based thoughts at the time of discharge  Description: Interventions:  - Provide medication and psycho-education to assist patient in compliance and developing insight into his/her illness   Outcome: Not Progressing     Problem: Ineffective Coping  Goal: Cooperates with admission process  Description: Interventions:   - Complete admission process  Outcome: Progressing  Goal: Identifies ineffective coping skills  Outcome: Not Progressing  Goal: Identifies healthy coping skills  Outcome: Not Progressing  Goal: Demonstrates healthy coping skills  Outcome: Not Progressing  Goal: Participates in unit activities  Description: Interventions:  - Provide therapeutic environment   - Provide required programming   - Redirect inappropriate behaviors   Outcome: Progressing  Goal: Patient/Family participate in treatment and DC plans  Description: Interventions:  - Provide therapeutic environment  Outcome: Progressing  Goal: Patient/Family verbalizes awareness of resources  Outcome: Not Progressing  Goal: Understands least restrictive measures  Description: Interventions:  - Utilize least restrictive behavior  Outcome: Not Progressing  Goal: Free from restraint events  Description: - Utilize least restrictive measures   - Provide behavioral interventions   - Redirect inappropriate behaviors   Outcome: Progressing     Problem: Risk for Violence/Aggression Toward Others  Goal: Refrain from harming others  Outcome: Not Progressing  Goal: Refrain from destructive acts on the environment or  property  Outcome: Not Progressing  Goal: Control angry outbursts  Description: Interventions:  - Monitor patient closely, per order  - Ensure early verbal de-escalation  - Monitor prn medication needs  - Set reasonable/therapeutic limits, outline behavioral expectations, and consequences   - Provide a non-threatening milieu, utilizing the least restrictive interventions   Outcome: Not Progressing  Goal: Identify appropriate positive anger management techniques  Description: Interventions:  - Offer anger management and coping skills groups   - Staff will provide positive feedback for appropriate anger control  Outcome: Not Progressing     - Record patient progress and toleration of activity level   Outcome: Progressing     Problem: DISCHARGE PLANNING  Goal: Discharge to home or other facility with appropriate resources  Description: INTERVENTIONS:  - Identify barriers to discharge w/patient and caregiver  - Arrange for needed discharge resources and transportation as appropriate  - Identify discharge learning needs (meds, wound care, etc.)  - Arrange for interpretive services to assist at discharge as needed  - Refer to Case Management Department for coordinating discharge planning if the patient needs post-hospital services based on physician/advanced practitioner order or complex needs related to functional status, cognitive ability, or social support system  Outcome: Progressing

## 2024-02-06 ENCOUNTER — OFFICE VISIT (OUTPATIENT)
Dept: SURGERY | Facility: CLINIC | Age: 69
End: 2024-02-06
Payer: MEDICARE

## 2024-02-06 ENCOUNTER — OFFICE VISIT (OUTPATIENT)
Dept: FAMILY MEDICINE | Facility: CLINIC | Age: 69
End: 2024-02-06
Payer: MEDICARE

## 2024-02-06 VITALS
WEIGHT: 175.94 LBS | TEMPERATURE: 98 F | OXYGEN SATURATION: 99 % | HEART RATE: 80 BPM | HEIGHT: 68 IN | DIASTOLIC BLOOD PRESSURE: 72 MMHG | SYSTOLIC BLOOD PRESSURE: 110 MMHG | BODY MASS INDEX: 26.66 KG/M2

## 2024-02-06 VITALS
DIASTOLIC BLOOD PRESSURE: 71 MMHG | HEART RATE: 80 BPM | TEMPERATURE: 98 F | WEIGHT: 174.5 LBS | BODY MASS INDEX: 26.45 KG/M2 | HEIGHT: 68 IN | SYSTOLIC BLOOD PRESSURE: 111 MMHG

## 2024-02-06 DIAGNOSIS — I10 BENIGN ESSENTIAL HTN: Chronic | ICD-10-CM

## 2024-02-06 DIAGNOSIS — I82.531 CHRONIC DEEP VEIN THROMBOSIS (DVT) OF POPLITEAL VEIN OF RIGHT LOWER EXTREMITY: ICD-10-CM

## 2024-02-06 DIAGNOSIS — G40.909 SEIZURE DISORDER AS SEQUELA OF CEREBROVASCULAR ACCIDENT: Chronic | ICD-10-CM

## 2024-02-06 DIAGNOSIS — T14.8XXA HEMATOMA: Primary | ICD-10-CM

## 2024-02-06 DIAGNOSIS — E11.22 CONTROLLED TYPE 2 DIABETES MELLITUS WITH CHRONIC KIDNEY DISEASE ON CHRONIC DIALYSIS, WITH LONG-TERM CURRENT USE OF INSULIN: ICD-10-CM

## 2024-02-06 DIAGNOSIS — N18.6 CONTROLLED TYPE 2 DIABETES MELLITUS WITH CHRONIC KIDNEY DISEASE ON CHRONIC DIALYSIS, WITH LONG-TERM CURRENT USE OF INSULIN: ICD-10-CM

## 2024-02-06 DIAGNOSIS — Z79.4 CONTROLLED TYPE 2 DIABETES MELLITUS WITH CHRONIC KIDNEY DISEASE ON CHRONIC DIALYSIS, WITH LONG-TERM CURRENT USE OF INSULIN: ICD-10-CM

## 2024-02-06 DIAGNOSIS — I69.398 SEIZURE DISORDER AS SEQUELA OF CEREBROVASCULAR ACCIDENT: Chronic | ICD-10-CM

## 2024-02-06 DIAGNOSIS — I69.351 HEMIPLEGIA AND HEMIPARESIS FOLLOWING CEREBRAL INFARCTION AFFECTING RIGHT DOMINANT SIDE: Chronic | ICD-10-CM

## 2024-02-06 DIAGNOSIS — N18.6 ESRD (END STAGE RENAL DISEASE): ICD-10-CM

## 2024-02-06 DIAGNOSIS — L60.9 NAIL ABNORMALITIES: Primary | ICD-10-CM

## 2024-02-06 DIAGNOSIS — Z99.2 CONTROLLED TYPE 2 DIABETES MELLITUS WITH CHRONIC KIDNEY DISEASE ON CHRONIC DIALYSIS, WITH LONG-TERM CURRENT USE OF INSULIN: ICD-10-CM

## 2024-02-06 PROCEDURE — 1126F AMNT PAIN NOTED NONE PRSNT: CPT | Mod: HCNC,CPTII,S$GLB, | Performed by: SURGERY

## 2024-02-06 PROCEDURE — 1101F PT FALLS ASSESS-DOCD LE1/YR: CPT | Mod: HCNC,CPTII,S$GLB,

## 2024-02-06 PROCEDURE — 3078F DIAST BP <80 MM HG: CPT | Mod: HCNC,CPTII,S$GLB,

## 2024-02-06 PROCEDURE — 1160F RVW MEDS BY RX/DR IN RCRD: CPT | Mod: HCNC,CPTII,S$GLB,

## 2024-02-06 PROCEDURE — 1157F ADVNC CARE PLAN IN RCRD: CPT | Mod: HCNC,CPTII,S$GLB,

## 2024-02-06 PROCEDURE — 99999 PR PBB SHADOW E&M-EST. PATIENT-LVL V: CPT | Mod: PBBFAC,HCNC,,

## 2024-02-06 PROCEDURE — 1157F ADVNC CARE PLAN IN RCRD: CPT | Mod: HCNC,CPTII,S$GLB, | Performed by: SURGERY

## 2024-02-06 PROCEDURE — 99214 OFFICE O/P EST MOD 30 MIN: CPT | Mod: HCNC,S$GLB,,

## 2024-02-06 PROCEDURE — 3078F DIAST BP <80 MM HG: CPT | Mod: HCNC,CPTII,S$GLB, | Performed by: SURGERY

## 2024-02-06 PROCEDURE — 3072F LOW RISK FOR RETINOPATHY: CPT | Mod: HCNC,CPTII,S$GLB,

## 2024-02-06 PROCEDURE — 1159F MED LIST DOCD IN RCRD: CPT | Mod: HCNC,CPTII,S$GLB,

## 2024-02-06 PROCEDURE — 3288F FALL RISK ASSESSMENT DOCD: CPT | Mod: HCNC,CPTII,S$GLB,

## 2024-02-06 PROCEDURE — 3008F BODY MASS INDEX DOCD: CPT | Mod: HCNC,CPTII,S$GLB, | Performed by: SURGERY

## 2024-02-06 PROCEDURE — 1159F MED LIST DOCD IN RCRD: CPT | Mod: HCNC,CPTII,S$GLB, | Performed by: SURGERY

## 2024-02-06 PROCEDURE — 3074F SYST BP LT 130 MM HG: CPT | Mod: HCNC,CPTII,S$GLB,

## 2024-02-06 PROCEDURE — 1126F AMNT PAIN NOTED NONE PRSNT: CPT | Mod: HCNC,CPTII,S$GLB,

## 2024-02-06 PROCEDURE — 99213 OFFICE O/P EST LOW 20 MIN: CPT | Mod: HCNC,S$GLB,, | Performed by: SURGERY

## 2024-02-06 PROCEDURE — 3288F FALL RISK ASSESSMENT DOCD: CPT | Mod: HCNC,CPTII,S$GLB, | Performed by: SURGERY

## 2024-02-06 PROCEDURE — 1101F PT FALLS ASSESS-DOCD LE1/YR: CPT | Mod: HCNC,CPTII,S$GLB, | Performed by: SURGERY

## 2024-02-06 PROCEDURE — 3008F BODY MASS INDEX DOCD: CPT | Mod: HCNC,CPTII,S$GLB,

## 2024-02-06 PROCEDURE — 3074F SYST BP LT 130 MM HG: CPT | Mod: HCNC,CPTII,S$GLB, | Performed by: SURGERY

## 2024-02-06 PROCEDURE — 99999 PR PBB SHADOW E&M-EST. PATIENT-LVL III: CPT | Mod: PBBFAC,HCNC,, | Performed by: SURGERY

## 2024-02-06 PROCEDURE — 3072F LOW RISK FOR RETINOPATHY: CPT | Mod: HCNC,CPTII,S$GLB, | Performed by: SURGERY

## 2024-02-06 NOTE — ASSESSMENT & PLAN NOTE
Dialysis M/W/F. Stable, asymptomatic chronic condition.  Will continue to maximize risk factor reduction and adjust medication as needed

## 2024-02-06 NOTE — ASSESSMENT & PLAN NOTE
The current medical regimen is effective;  continue present plan and medications. Amlodipine 10mg, labetalol

## 2024-02-06 NOTE — PROGRESS NOTES
HPI     Chief Complaint:  Chief Complaint   Patient presents with    Nail Problem     Pointer left finger lose nail from a drink can. Now its going back and he think it might be infected.       Miguel Moore is a 68 y.o. male with multiple medical diagnoses as listed in the medical history and problem list that presents for fingernail deformity.  Pt is new to me but seen in clinic with last appointment 1/18/2024.      HPI    Pt states was pulling up coke tab and fingernail fell off > 1 month ago. Since then has improved but nail still discolored and having some pain. Was seen in clinic 1/18, picture noted then. Some discoloration noted distal and ventral aspect of digit. UTD on TDAP.     Other concerns below    Assessment & Plan       Problem List Items Addressed This Visit          Neuro    Seizure disorder as sequela of cerebrovascular accident (Chronic)    Current Assessment & Plan     Keppra. The current medical regimen is effective;  continue present plan and medications.         Hemiplegia and hemiparesis following cerebral infarction affecting right dominant side (Chronic)    Current Assessment & Plan     Ambulatory with cane, in wheelchair in clinic. Stable, asymptomatic chronic condition.  Will continue to maximize risk factor reduction and adjust medication as needed. Chronic right sided weakness.               Cardiac/Vascular    Benign essential HTN (Chronic)    Overview     01/06/2021 TTE mild left atrial enlargement.  LV normal size and systolic function LVEF 55%.  Indeterminate diastolic function.  Mild right atrial enlargement.  Normal RV systolic function.  Normal RV size.  PA pressure 20.  Normal CVP.  Three.         Current Assessment & Plan     The current medical regimen is effective;  continue present plan and medications. Amlodipine 10mg, labetalol            Renal/    ESRD (end stage renal disease)    Overview     2/27/20 renal US with dopplers no ALF.  Medical renal disease  8/2020  renal US: 1. Symmetric diffusely increased cortical echogenicity and elevated resistive indices, nonspecific indicators of chronic medical renal disease.  2. Prostatomegaly.  Some of the provided images are suggestive of a distinct hyperechoic component of the prostate gland, of uncertain etiology or significance, and potentially artifactual.  Dedicated prostate ultrasound or MRI may be of benefit for further characterization.    Started on HD while hospitalized for progression to ESRD 8/2020  -Continue dialysis per nephrology  -Outpatient HD has been arranged  -Had planned discharge 8/27 if remained afebrile and cultures negative.  Unfortunately his blood culture grew Candida parapsilosis  -Dr. Gomez with ID consulted and case discussed with him.  Started micafungin 8/27 and now on fluconazole.  -Dialysis line removed after HD 8/28 and plan line holiday over weekend.  -new line by IR on 8/31, tolerated dialysis fluid.  -continue outpatient dialysis.         Current Assessment & Plan     Dialysis M/W/F. Stable, asymptomatic chronic condition.  Will continue to maximize risk factor reduction and adjust medication as needed              Hematology    Chronic deep vein thrombosis (DVT) of popliteal vein of right lower extremity 9/21/20 outside US; unprovoked; anticoagulation    Current Assessment & Plan     Eliquis. The current medical regimen is effective;  continue present plan and medications.              Endocrine    Controlled type 2 diabetes mellitus with chronic kidney disease on chronic dialysis, with long-term current use of insulin    Current Assessment & Plan     Lab Results   Component Value Date    HGBA1C 5.9 (H) 10/26/2023     Pt reports monitor BG at home and well controlled. Fasting <120. The current medical regimen is effective;  continue present plan and medications.            Other Visit Diagnoses       Nail abnormalities    -  Primary  Concerned for bone involvement or infection. Will complete  xray.   Referral placed.   Consider oral abx  Continue present plan for antibiotic ointment, monitor for s/s of infection    Relevant Orders    X-Ray Hand Complete Left    Ambulatory referral/consult to Rheumatology                  --------------------------------------------      Health Maintenance:  Health Maintenance         Date Due Completion Date    RSV Vaccine (Age 60+ and Pregnant patients) (1 - 1-dose 60+ series) Never done ---    Shingles Vaccine (2 of 2) 09/25/2020 7/31/2020    COVID-19 Vaccine (7 - 2023-24 season) 09/01/2023 11/10/2021    Foot Exam 02/14/2024 2/14/2023 (Done)    Override on 2/14/2023: Done    Override on 12/7/2021: Done    Override on 1/9/2020: Done    Override on 4/24/2019: Done    Override on 1/15/2018: Done    Override on 4/3/2017: Done (Gabriella Manjarrez/Podiatry)    Eye Exam 05/02/2024 5/2/2023    Hemoglobin A1c 04/26/2024 10/26/2023    Lipid Panel 04/27/2024 4/27/2023    Colorectal Cancer Screening 10/26/2024 10/26/2023    TETANUS VACCINE 05/08/2027 5/8/2017            Follow Up:  Follow up if symptoms worsen or fail to improve.    Discussed DDx, condition, and treatment.   Education sent to patient portal/included in after visit summary.  ED precautions given.   Notify provider if symptoms do not resolve or increase in severity.   Patient verbalizes understanding and agrees with plan of care.    Exam     Review of Systems:  (as noted above)  Review of Systems    Physical Exam:   Physical Exam  Vitals reviewed.   Constitutional:       Appearance: Normal appearance. He is normal weight.   Cardiovascular:      Rate and Rhythm: Normal rate and regular rhythm.      Pulses: Normal pulses.      Arteriovenous access: Left arteriovenous access is present.     Comments: +bruit/thrill  Pulmonary:      Effort: Pulmonary effort is normal. No respiratory distress.      Breath sounds: Normal breath sounds. No wheezing.   Musculoskeletal:         General: Normal range of motion.      Right hand: No  "tenderness or bony tenderness. Normal range of motion. Normal sensation.      Left hand: Deformity present. No tenderness or bony tenderness. Normal sensation.      Cervical back: Normal range of motion and neck supple. No rigidity.   Skin:     General: Skin is warm and dry.      Capillary Refill: Capillary refill takes less than 2 seconds.      Findings: Wound present.      Nails: There is clubbing.      Comments: Left hand second digit; nail deformity   Neurological:      General: No focal deficit present.      Mental Status: He is alert and oriented to person, place, and time.   Psychiatric:         Mood and Affect: Mood normal.       Vitals:    02/06/24 1313   BP: 110/72   Pulse: 80   Temp: 98 °F (36.7 °C)   TempSrc: Oral   SpO2: 99%   Weight: 79.8 kg (175 lb 14.8 oz)   Height: 5' 8" (1.727 m)      Body mass index is 26.75 kg/m².        History     Past Medical History:  Past Medical History:   Diagnosis Date    Allergy     Clotting disorder     Elaquis and APlavix    Deep vein thrombosis     Diabetes mellitus, type 2     Hypertension     Nuclear sclerosis of both eyes 6/9/2017    Renal disorder     Seizures     Stroke     Urinary tract infection        Past Surgical History:  Past Surgical History:   Procedure Laterality Date    CATARACT EXTRACTION W/  INTRAOCULAR LENS IMPLANT Right 10/05/2017    Dr. Tay    CATARACT EXTRACTION W/  INTRAOCULAR LENS IMPLANT Left 10/19/2017    Dr. Tay    CYSTOSCOPY W/ RETROGRADES Bilateral 2/18/2021    Procedure: CYSTOSCOPY, WITH RETROGRADE PYELOGRAM;  Surgeon: JEMMA Styles MD;  Location: [facility] OR;  Service: Urology;  Laterality: Bilateral;  REQUESTING EARLY AS POSSIBE-LO / 2/8/2021 @ 1:13PM  RN Pre Op 2-11-21, Covid NEGATIVE ON  2-17-21.  C A    ESOPHAGOGASTRODUODENOSCOPY N/A 8/17/2020    Procedure: EGD (ESOPHAGOGASTRODUODENOSCOPY);  Surgeon: Desmond Chapman MD;  Location: [facility] ENDO;  Service: Endoscopy;  Laterality: N/A;    EYE SURGERY Bilateral     cataract "    FEMORAL ARTERY STENT      FRACTURE SURGERY      KNEE SURGERY      bilateral scope       Social History:  Social History     Socioeconomic History    Marital status: Single   Occupational History    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, etc   Tobacco Use    Smoking status: Never    Smokeless tobacco: Never   Substance and Sexual Activity    Alcohol use: No    Drug use: No   Social History Narrative    Lives with daughter       Family History:  Family History   Problem Relation Age of Onset    Diabetes Mother     Heart disease Mother     Hypertension Mother     Cataracts Mother     No Known Problems Father     Hypertension Sister     No Known Problems Brother     No Known Problems Maternal Aunt     No Known Problems Maternal Uncle     No Known Problems Paternal Aunt     No Known Problems Paternal Uncle     No Known Problems Maternal Grandmother     No Known Problems Maternal Grandfather     No Known Problems Paternal Grandmother     No Known Problems Paternal Grandfather     No Known Problems Daughter     No Known Problems Other     Amblyopia Neg Hx     Blindness Neg Hx     Cancer Neg Hx     Glaucoma Neg Hx     Macular degeneration Neg Hx     Retinal detachment Neg Hx     Strabismus Neg Hx     Stroke Neg Hx     Thyroid disease Neg Hx        Allergies and Medications: (updated and reviewed)  Review of patient's allergies indicates:   Allergen Reactions    Ace inhibitors      Hyperkalemia 8/2018  Other reaction(s): Unknown    Penicillins Hives    Tizanidine      Felt hot     Current Outpatient Medications   Medication Sig Dispense Refill    amLODIPine (NORVASC) 10 MG tablet Take 1 tablet by mouth once daily 90 tablet 3    apixaban (ELIQUIS) 5 mg Tab Take 1 tablet (5 mg total) by mouth 2 (two) times daily. 180 tablet 3    aspirin (ECOTRIN) 81 MG EC tablet Take 1 tablet (81 mg total) by mouth once daily. 90 tablet 3    atorvastatin (LIPITOR) 80 MG tablet Take 1 tablet (80 mg total) by mouth once  daily. 90 tablet 3    blood sugar diagnostic Strp To check BG 1 times daily, to use with insurance preferred meter 100 strip 3    blood-glucose meter kit To check BG 1 times daily, to use with insurance preferred meter 1 each 0    cholecalciferol, vitamin D3, (VITAMIN D3) 25 mcg (1,000 unit) capsule Take 1 capsule (1,000 Units total) by mouth once daily. 90 capsule 3    clotrimazole-betamethasone 1-0.05% (LOTRISONE) cream Apply topically 2 (two) times daily. 15 g 0    finasteride (PROSCAR) 5 mg tablet Take 1 tablet (5 mg total) by mouth once daily. 90 tablet 3    hydrocortisone (WESTCORT) 0.2 % cream Apply topically 2 (two) times daily. 15 g 0    iron sucrose in NS (VENOFER) 100 mg/100 mL PgBk 50 mg.      labetaloL (NORMODYNE) 100 MG tablet Take 1 tablet (100 mg total) by mouth once daily. 90 tablet 3    lancets Misc To check BG 1 times daily, to use with insurance preferred meter 100 each 4    methoxy peg-epoetin beta (MIRCERA INJ) 75 mcg.      mupirocin (BACTROBAN) 2 % ointment Apply to affected area 3 times daily 22 g 1    RENVELA 800 mg Tab Take 1 tablet (800 mg total) by mouth 3 (three) times daily with meals. 90 tablet 0    tamsulosin (FLOMAX) 0.4 mg Cap Take 2 capsules (0.8 mg total) by mouth once daily. 180 capsule 3    levETIRAcetam (KEPPRA) 250 MG Tab Take 1 tablet (250 mg total) by mouth 2 (two) times daily on Tuesday, Thursday, Saturday & Sunday. Take 2 tablets (500mg total) by mouth in the morning and 1 tablet (250mg total) in the evening on dialysis days (Monday, Wednesday, & Friday) 80 tablet 11     No current facility-administered medications for this visit.       Patient Care Team:  Raciel Raymond MD as PCP - General (Internal Medicine)  Gabriella Manjarrez DPM as Consulting Physician (Podiatry)  Mac Chambers Jr., MD as Consulting Physician (Urology)  Geovany Rucker MD as Consulting Physician (Neurology)  Bob Tay MD as Consulting Physician (Ophthalmology)  Freeman,  MD Adrian as Consulting Physician (Nephrology)  Nikhil Martino MA as Care Coordinator         - The patient is given an After Visit Summary that lists all medications with directions, allergies, education, orders placed during this encounter and follow-up instructions.      - I have reviewed the patient's medical information including past medical, family, and social history sections including the medications and allergies.      - We discussed the patient's current medications.     This note was created by combination of typed  and MModal dictation.  Transcription errors may be present.  If there are any questions, please contact me.

## 2024-02-06 NOTE — ASSESSMENT & PLAN NOTE
Lab Results   Component Value Date    HGBA1C 5.9 (H) 10/26/2023       Pt reports monitor BG at home and well controlled. Fasting <120. The current medical regimen is effective;  continue present plan and medications.

## 2024-02-06 NOTE — PROGRESS NOTES
Surgery Clinic Note    Miguel Moore is a 68 y.o. year old male in clinic today for follow up of left shin hematoma. Was seen 2 wks ago. He reports that he is concerned the skin is dark and there is still swelling. There is a small scab. I removed the scab and removed about 10 mL of purulent clotted blood. Now the cavity is collapsed. He has no fevers. He feels better and more reassured. Still on blood thinners.    ROS:  Negative except above    PE:  Vitals:    02/06/24 1033   BP: 111/71   Pulse: 80   Temp: 98.1 °F (36.7 °C)       NAD  No belabored breathing  Left shin: 1 cm eschar, partially trimmed away to enter the wound cavity, purulent dark bloody fluid squeezed out (10 mL), and irrigated with normal saline.    A/P:  Miguel Moore is a 68 y.o. year old male on eliquis with a shin hematoma    -residual wound cavity contents drained out manually.  -cover with bandage. Shower/clean wound daily  -rtc prn    Dragan Dean  General Surgery - [facility]  2/6/2024

## 2024-02-06 NOTE — ASSESSMENT & PLAN NOTE
Ambulatory with cane, in wheelchair in clinic. Stable, asymptomatic chronic condition.  Will continue to maximize risk factor reduction and adjust medication as needed. Chronic right sided weakness.

## 2024-02-08 ENCOUNTER — TELEPHONE (OUTPATIENT)
Dept: FAMILY MEDICINE | Facility: CLINIC | Age: 69
End: 2024-02-08
Payer: MEDICARE

## 2024-02-08 ENCOUNTER — HOSPITAL ENCOUNTER (OUTPATIENT)
Dept: RADIOLOGY | Facility: HOSPITAL | Age: 69
Discharge: HOME | End: 2024-02-08
Payer: MEDICARE

## 2024-02-08 DIAGNOSIS — L60.9 NAIL ABNORMALITIES: ICD-10-CM

## 2024-02-08 DIAGNOSIS — R93.89 ABNORMAL X-RAY: ICD-10-CM

## 2024-02-08 DIAGNOSIS — L60.9 NAIL ABNORMALITIES: Primary | ICD-10-CM

## 2024-02-08 PROCEDURE — 73130 X-RAY EXAM OF HAND: CPT | Mod: TC,HCNC,FY,PO,LT

## 2024-02-08 PROCEDURE — 73130 X-RAY EXAM OF HAND: CPT | Mod: 26,HCNC,LT, | Performed by: RADIOLOGY

## 2024-02-08 NOTE — TELEPHONE ENCOUNTER
Called patient about the X ray and notified that he has a referral in for ortho and advise him to call to schedule. Number was given to patient and will call.

## 2024-02-20 ENCOUNTER — HOSPITAL ENCOUNTER (OUTPATIENT)
Dept: CARDIOLOGY | Facility: HOSPITAL | Age: 69
Discharge: HOME | End: 2024-02-20
Attending: SURGERY
Payer: MEDICARE

## 2024-02-20 DIAGNOSIS — N18.6 ESRD (END STAGE RENAL DISEASE): ICD-10-CM

## 2024-02-20 PROCEDURE — 93990 DOPPLER FLOW TESTING: CPT | Mod: 26,HCNC,, | Performed by: SURGERY

## 2024-02-20 PROCEDURE — 93990 DOPPLER FLOW TESTING: CPT | Mod: TC,HCNC

## 2024-02-21 ENCOUNTER — APPOINTMENT (OUTPATIENT)
Dept: RADIOLOGY | Facility: HOSPITAL | Age: 69
End: 2024-02-21
Attending: ORTHOPAEDIC SURGERY
Payer: MEDICARE

## 2024-02-21 ENCOUNTER — OFFICE VISIT (OUTPATIENT)
Dept: ORTHOPEDICS | Facility: CLINIC | Age: 69
End: 2024-02-21
Payer: MEDICARE

## 2024-02-21 DIAGNOSIS — R93.89 ABNORMAL X-RAY: ICD-10-CM

## 2024-02-21 DIAGNOSIS — L60.9 NAIL ABNORMALITIES: ICD-10-CM

## 2024-02-21 PROCEDURE — 1160F RVW MEDS BY RX/DR IN RCRD: CPT | Mod: HCNC,CPTII,S$GLB, | Performed by: ORTHOPAEDIC SURGERY

## 2024-02-21 PROCEDURE — 3072F LOW RISK FOR RETINOPATHY: CPT | Mod: HCNC,CPTII,S$GLB, | Performed by: ORTHOPAEDIC SURGERY

## 2024-02-21 PROCEDURE — 1159F MED LIST DOCD IN RCRD: CPT | Mod: HCNC,CPTII,S$GLB, | Performed by: ORTHOPAEDIC SURGERY

## 2024-02-21 PROCEDURE — 73140 X-RAY EXAM OF FINGER(S): CPT | Mod: TC,HCNC,FY,PN,LT

## 2024-02-21 PROCEDURE — 99203 OFFICE O/P NEW LOW 30 MIN: CPT | Mod: HCNC,S$GLB,, | Performed by: ORTHOPAEDIC SURGERY

## 2024-02-21 PROCEDURE — 1126F AMNT PAIN NOTED NONE PRSNT: CPT | Mod: HCNC,CPTII,S$GLB, | Performed by: ORTHOPAEDIC SURGERY

## 2024-02-21 PROCEDURE — 1101F PT FALLS ASSESS-DOCD LE1/YR: CPT | Mod: HCNC,CPTII,S$GLB, | Performed by: ORTHOPAEDIC SURGERY

## 2024-02-21 PROCEDURE — 3288F FALL RISK ASSESSMENT DOCD: CPT | Mod: HCNC,CPTII,S$GLB, | Performed by: ORTHOPAEDIC SURGERY

## 2024-02-21 PROCEDURE — 99999 PR PBB SHADOW E&M-EST. PATIENT-LVL III: CPT | Mod: PBBFAC,HCNC,, | Performed by: ORTHOPAEDIC SURGERY

## 2024-02-21 PROCEDURE — 73140 X-RAY EXAM OF FINGER(S): CPT | Mod: 26,HCNC,LT, | Performed by: RADIOLOGY

## 2024-02-21 PROCEDURE — 1157F ADVNC CARE PLAN IN RCRD: CPT | Mod: HCNC,CPTII,S$GLB, | Performed by: ORTHOPAEDIC SURGERY

## 2024-02-22 ENCOUNTER — TELEPHONE (OUTPATIENT)
Dept: ORTHOPEDICS | Facility: CLINIC | Age: 69
End: 2024-02-22
Payer: MEDICARE

## 2024-02-22 NOTE — TELEPHONE ENCOUNTER
Spoke to patient and scheduled a sooner appointment on 2/27 ----- Message from Kamini Randall sent at 2/22/2024  3:53 PM CST -----  Contact: [place]  Type:  Patient Call          Who Called: Dr Liuzza - [facility]         Does the patient know what this is regarding?: requesting a call back for a appt soon as possible ; diagnosis code osteomyelitis ; Provider would like him to be seen soon  please advise           Would the patient rather a call back or a response via [facility]?call           Best Call Back Number:[phone]             Additional Information:

## 2024-02-23 NOTE — PROGRESS NOTES
Assessment: 68 y.o. male with trauma to the nail bed of the right index finger, osteolysis of distal phalanx    I explained my diagnostic impression and the reasoning behind it in detail, using layman's terms.     Plan:   - Repeat XR - ostephysis concerning for possible osteo  - CBC, ESR, CRP  - if concern for osteo on labs/imaging will refer to hand       All questions were answered in detail. The patient is in full agreement with the treatment plan and will proceed accordingly.    Chief Complaint   Patient presents with    Left Hand - Pain       Initial visit (2/21/24): Miguel Moore is a 68 y.o. male who presents today complaining of Pain of the Left Hand     Nail was lifted off his right index finger when opening a coke can 6 weeks ago  He has been seen in primary care for this a couple of times.  Was initially given Naprosyn which held   Continues to have swelling to the digit as well as abnormality of the nail  From the finger  No signs of overt infection   Is dependent on hemodialysis for kidney failure      This patient was seen in consultation at the request of Taylor Noble    This is the extent of the patient's complaints at this time.          Review of patient's allergies indicates:   Allergen Reactions    Ace inhibitors      Hyperkalemia 8/2018  Other reaction(s): Unknown    Penicillins Hives    Tizanidine      Felt hot         Current Outpatient Medications:     amLODIPine (NORVASC) 10 MG tablet, Take 1 tablet by mouth once daily, Disp: 90 tablet, Rfl: 3    apixaban (ELIQUIS) 5 mg Tab, Take 1 tablet (5 mg total) by mouth 2 (two) times daily., Disp: 180 tablet, Rfl: 3    aspirin (ECOTRIN) 81 MG EC tablet, Take 1 tablet (81 mg total) by mouth once daily., Disp: 90 tablet, Rfl: 3    atorvastatin (LIPITOR) 80 MG tablet, Take 1 tablet (80 mg total) by mouth once daily., Disp: 90 tablet, Rfl: 3    blood sugar diagnostic Strp, To check BG 1 times daily, to use with insurance preferred meter, Disp: 100 strip,  Rfl: 3    blood-glucose meter kit, To check BG 1 times daily, to use with insurance preferred meter, Disp: 1 each, Rfl: 0    cholecalciferol, vitamin D3, (VITAMIN D3) 25 mcg (1,000 unit) capsule, Take 1 capsule (1,000 Units total) by mouth once daily., Disp: 90 capsule, Rfl: 3    clotrimazole-betamethasone 1-0.05% (LOTRISONE) cream, Apply topically 2 (two) times daily., Disp: 15 g, Rfl: 0    finasteride (PROSCAR) 5 mg tablet, Take 1 tablet (5 mg total) by mouth once daily., Disp: 90 tablet, Rfl: 3    hydrocortisone (WESTCORT) 0.2 % cream, Apply topically 2 (two) times daily., Disp: 15 g, Rfl: 0    iron sucrose in NS (VENOFER) 100 mg/100 mL PgBk, 50 mg., Disp: , Rfl:     labetaloL (NORMODYNE) 100 MG tablet, Take 1 tablet (100 mg total) by mouth once daily., Disp: 90 tablet, Rfl: 3    lancets Misc, To check BG 1 times daily, to use with insurance preferred meter, Disp: 100 each, Rfl: 4    methoxy peg-epoetin beta (MIRCERA INJ), 75 mcg., Disp: , Rfl:     mupirocin (BACTROBAN) 2 % ointment, Apply to affected area 3 times daily, Disp: 22 g, Rfl: 1    RENVELA 800 mg Tab, Take 1 tablet (800 mg total) by mouth 3 (three) times daily with meals., Disp: 90 tablet, Rfl: 0    tamsulosin (FLOMAX) 0.4 mg Cap, Take 2 capsules (0.8 mg total) by mouth once daily., Disp: 180 capsule, Rfl: 3    levETIRAcetam (KEPPRA) 250 MG Tab, Take 1 tablet (250 mg total) by mouth 2 (two) times daily on Tuesday, Thursday, Saturday & Sunday. Take 2 tablets (500mg total) by mouth in the morning and 1 tablet (250mg total) in the evening on dialysis days (Monday, Wednesday, & Friday), Disp: 80 tablet, Rfl: 11    Physical Exam:   Vitals:    02/21/24 1431   PainSc: 0-No pain       General: Patient is alert, awake and oriented to time, place and person. Mood and affect are appropriate.  Patient does not appear to be in any distress, denies any constitutional symptoms and appears stated age.   HEENT:  Pupils are equal and round, sclera are not injected.  External examination of ears and nose reveals no abnormalities. Cranial nerves II-X are grossly intact  Skin:  no rashes, abrasions or open wounds on the affected extremity   Resp: No respiratory distress or audible wheezing   CV: 2+  pulses, all extremities warm and well perfused   Right Hand   It appears that his nail fell off with the trauma that he has reporting a new nail is growing in   The with the finger is somewhat swollen but is not red and is not tender   Full range of motion of the digit   No open wounds or drainage   LTSI m/u/r  2+ RP  + EPL, IO, FDS, FDP       Imaging:  Three views of the left index finger show a small area of osteolysis underlying the nail concerning for potential osteomyelitis.  It does not appear to be significantly changed compared to previous films    I personally reviewed and interpreted the patient's imaging obtained today in clinic and prior to visit       A note notifying Taylor Noble of my findings was sent via the electronic medical record     This note was created by combination of typed  and M-Modal dictation. Transcription and phonetic errors may be present.  If there are any questions, please contact me.    Past Medical History:   Diagnosis Date    Allergy     Clotting disorder     Elaquis and APlavix    Deep vein thrombosis     Diabetes mellitus, type 2     Hypertension     Nuclear sclerosis of both eyes 6/9/2017    Renal disorder     Seizures     Stroke     Urinary tract infection        Active Problem List with Overview Notes    Diagnosis Date Noted    Bilateral posterior capsular opacification 05/02/2023    Pseudophakia 01/06/2022    History of diabetic ulcer of foot     Elevated PSA 2/18/21 prostate bx normal 02/18/2021 2/18/21 prostate bx normal        Hematuria 01/10/2021    Pulmonary nodule 1/2021 4 mm nodule. 01/06/2021 1/6/2021 CT abd/pelvis: 4 mm nodule in the right middle lobe        Pancytopenia 01/04/2021    Elevated liver enzymes 01/04/2021     Chronic deep vein thrombosis (DVT) of popliteal vein of right lower extremity 9/21/20 outside US; unprovoked; anticoagulation 09/21/2020    History of fungal infection candida parasilosis hospitalization 8/2020 08/29/2020     -Found to have candidemia - source unclear  -infectious disease consulted, Started on micafungin and now converted to fluconazole  -Repeat cultures negative so far  -Dialysis line removed 8/28.   line replaced on 08/31 after line holiday.  -end date of fluconazole will be 09/11/2020.  Patient has ophthalmology follow-up scheduled on 09/08/2020. Tentative end date 9/11/2020 If no endophthalmitis. If noted to have endophthalmitis during optho visit, would need at least 4-6 weeks of treatment        Anemia in chronic kidney disease 08/26/2020    Nephrotic syndrome 08/16/2020    Anasarca 08/16/2020    Hypocalcemia 08/16/2020    Type 2 diabetes mellitus with diabetic neuropathy, with long-term current use of insulin 05/10/2018    History of stroke 12/04/2017    CME (cystoid macular edema), bilateral 11/16/2017    Refractive error 11/16/2017    Senile nuclear sclerosis 10/05/2017    Right sided weakness 09/11/2017    Secondary hyperparathyroidism of renal origin 08/03/2017    Vitamin D deficiency 08/03/2017    Nuclear sclerosis, left 06/09/2017    Cortical cataract of both eyes 06/09/2017    DM type 2 without retinopathy 06/09/2017    Microcytosis 9/2019 labs c/w AoCD and AoCKD 03/22/2017     Seen on admission as well 2015; normal Hb, low MCV.  Normal RDW  08/17/2020 EGD normal esophagus.  Discolored nodular and texture change mucosa in the antrum.  Normal duodenum.  Path with foveolar hyperplasia and early xanthoma formation.  H pylori negative.  Mild chronic inflammation without acute activity        ESRD (end stage renal disease) 03/22/2017 2/27/20 renal US with dopplers no ALF.  Medical renal disease  8/2020 renal US: 1. Symmetric diffusely increased cortical echogenicity and elevated  resistive indices, nonspecific indicators of chronic medical renal disease.  2. Prostatomegaly.  Some of the provided images are suggestive of a distinct hyperechoic component of the prostate gland, of uncertain etiology or significance, and potentially artifactual.  Dedicated prostate ultrasound or MRI may be of benefit for further characterization.    Started on HD while hospitalized for progression to ESRD 8/2020  -Continue dialysis per nephrology  -Outpatient HD has been arranged  -Had planned discharge 8/27 if remained afebrile and cultures negative.  Unfortunately his blood culture grew Candida parapsilosis  -Dr. Gomez with ID consulted and case discussed with him.  Started micafungin 8/27 and now on fluconazole.  -Dialysis line removed after HD 8/28 and plan line holiday over weekend.  -new line by IR on 8/31, tolerated dialysis fluid.  -continue outpatient dialysis.      Benign essential HTN 03/21/2017 01/06/2021 TTE mild left atrial enlargement.  LV normal size and systolic function LVEF 55%.  Indeterminate diastolic function.  Mild right atrial enlargement.  Normal RV systolic function.  Normal RV size.  PA pressure 20.  Normal CVP.  Three.      Dyslipidemia 03/21/2017    Controlled type 2 diabetes mellitus with chronic kidney disease on chronic dialysis, with long-term current use of insulin 03/21/2017    BPH (benign prostatic hyperplasia) 2/18/21 prostate bx normal 03/21/2017 6/26/2017 renal US mod prostatomegaly.      Seizure disorder as sequela of cerebrovascular accident 03/21/2017    Hemiplegia and hemiparesis following cerebral infarction affecting right dominant side 03/21/2017       Past Surgical History:   Procedure Laterality Date    CATARACT EXTRACTION W/  INTRAOCULAR LENS IMPLANT Right 10/05/2017    Dr. Tay    CATARACT EXTRACTION W/  INTRAOCULAR LENS IMPLANT Left 10/19/2017    Dr. Tay    CYSTOSCOPY W/ RETROGRADES Bilateral 2/18/2021    Procedure: CYSTOSCOPY, WITH  RETROGRADE PYELOGRAM;  Surgeon: JEMMA Styles MD;  Location: [facility] OR;  Service: Urology;  Laterality: Bilateral;  REQUESTING EARLY AS POSSIBE-LO / 2/8/2021 @ 1:13PM  RN Pre Op 2-11-21, Covid NEGATIVE ON  2-17-21.  C A    ESOPHAGOGASTRODUODENOSCOPY N/A 8/17/2020    Procedure: EGD (ESOPHAGOGASTRODUODENOSCOPY);  Surgeon: Desmond Chapman MD;  Location: [facility] ENDO;  Service: Endoscopy;  Laterality: N/A;    EYE SURGERY Bilateral     cataract    FEMORAL ARTERY STENT      FRACTURE SURGERY      KNEE SURGERY      bilateral scope       Family History   Problem Relation Age of Onset    Diabetes Mother     Heart disease Mother     Hypertension Mother     Cataracts Mother     No Known Problems Father     Hypertension Sister     No Known Problems Brother     No Known Problems Maternal Aunt     No Known Problems Maternal Uncle     No Known Problems Paternal Aunt     No Known Problems Paternal Uncle     No Known Problems Maternal Grandmother     No Known Problems Maternal Grandfather     No Known Problems Paternal Grandmother     No Known Problems Paternal Grandfather     No Known Problems Daughter     No Known Problems Other     Amblyopia Neg Hx     Blindness Neg Hx     Cancer Neg Hx     Glaucoma Neg Hx     Macular degeneration Neg Hx     Retinal detachment Neg Hx     Strabismus Neg Hx     Stroke Neg Hx     Thyroid disease Neg Hx

## 2024-02-26 LAB
HD FISTULA OUTFLOW VEIN VESSEL: NORMAL
HD INFLOW ARTERY VESSEL: NORMAL
RIGHT DIS GRAFT PSV: 108 CM/ SEC
RIGHT INFLOW PSV: 211 CM/S
RIGHT MID GRAFT PSV: 70 CM/S
RIGHT OUTFLOW VEIN PSV: 132 CM/ SEC
RIGHT PROX ANA PSV: 536 CM/S
RIGHT PROX GRAFT PSV: 541 CM/S
RIGHT VOLUME FLOW PSV: 4200 ML/MIN

## 2024-02-27 ENCOUNTER — OFFICE VISIT (OUTPATIENT)
Dept: UROLOGY | Facility: CLINIC | Age: 69
End: 2024-02-27
Payer: MEDICARE

## 2024-02-27 ENCOUNTER — OFFICE VISIT (OUTPATIENT)
Dept: ORTHOPEDICS | Facility: CLINIC | Age: 69
End: 2024-02-27
Payer: MEDICARE

## 2024-02-27 VITALS — BODY MASS INDEX: 26.66 KG/M2 | WEIGHT: 175.94 LBS | HEIGHT: 68 IN

## 2024-02-27 DIAGNOSIS — R97.20 ELEVATED PSA: Primary | ICD-10-CM

## 2024-02-27 DIAGNOSIS — M86.9 OSTEOMYELITIS, UNSPECIFIED SITE, UNSPECIFIED TYPE: Primary | ICD-10-CM

## 2024-02-27 DIAGNOSIS — N40.0 BENIGN NON-NODULAR PROSTATIC HYPERPLASIA WITHOUT LOWER URINARY TRACT SYMPTOMS: Chronic | ICD-10-CM

## 2024-02-27 DIAGNOSIS — M79.642 PAIN OF LEFT HAND: ICD-10-CM

## 2024-02-27 DIAGNOSIS — N52.8 OTHER MALE ERECTILE DYSFUNCTION: ICD-10-CM

## 2024-02-27 DIAGNOSIS — R35.1 NOCTURIA MORE THAN TWICE PER NIGHT: ICD-10-CM

## 2024-02-27 DIAGNOSIS — N13.8 BPH WITH OBSTRUCTION/LOWER URINARY TRACT SYMPTOMS: ICD-10-CM

## 2024-02-27 DIAGNOSIS — N40.1 BPH WITH OBSTRUCTION/LOWER URINARY TRACT SYMPTOMS: ICD-10-CM

## 2024-02-27 PROCEDURE — 99999 PR PBB SHADOW E&M-EST. PATIENT-LVL IV: CPT | Mod: PBBFAC,HCNC,, | Performed by: ORTHOPAEDIC SURGERY

## 2024-02-27 PROCEDURE — 3288F FALL RISK ASSESSMENT DOCD: CPT | Mod: CPTII,S$GLB,, | Performed by: UROLOGY

## 2024-02-27 PROCEDURE — 99999 PR PBB SHADOW E&M-EST. PATIENT-LVL III: CPT | Mod: PBBFAC,HCNC,, | Performed by: UROLOGY

## 2024-02-27 PROCEDURE — 1159F MED LIST DOCD IN RCRD: CPT | Mod: CPTII,S$GLB,, | Performed by: ORTHOPAEDIC SURGERY

## 2024-02-27 PROCEDURE — 1157F ADVNC CARE PLAN IN RCRD: CPT | Mod: CPTII,S$GLB,, | Performed by: ORTHOPAEDIC SURGERY

## 2024-02-27 PROCEDURE — 1101F PT FALLS ASSESS-DOCD LE1/YR: CPT | Mod: CPTII,S$GLB,, | Performed by: UROLOGY

## 2024-02-27 PROCEDURE — 3072F LOW RISK FOR RETINOPATHY: CPT | Mod: CPTII,S$GLB,, | Performed by: ORTHOPAEDIC SURGERY

## 2024-02-27 PROCEDURE — 1126F AMNT PAIN NOTED NONE PRSNT: CPT | Mod: CPTII,S$GLB,, | Performed by: ORTHOPAEDIC SURGERY

## 2024-02-27 PROCEDURE — 1157F ADVNC CARE PLAN IN RCRD: CPT | Mod: CPTII,S$GLB,, | Performed by: UROLOGY

## 2024-02-27 PROCEDURE — 3072F LOW RISK FOR RETINOPATHY: CPT | Mod: CPTII,S$GLB,, | Performed by: UROLOGY

## 2024-02-27 PROCEDURE — 1160F RVW MEDS BY RX/DR IN RCRD: CPT | Mod: CPTII,S$GLB,, | Performed by: UROLOGY

## 2024-02-27 PROCEDURE — 3288F FALL RISK ASSESSMENT DOCD: CPT | Mod: CPTII,S$GLB,, | Performed by: ORTHOPAEDIC SURGERY

## 2024-02-27 PROCEDURE — 1101F PT FALLS ASSESS-DOCD LE1/YR: CPT | Mod: CPTII,S$GLB,, | Performed by: ORTHOPAEDIC SURGERY

## 2024-02-27 PROCEDURE — 1126F AMNT PAIN NOTED NONE PRSNT: CPT | Mod: CPTII,S$GLB,, | Performed by: UROLOGY

## 2024-02-27 PROCEDURE — 1159F MED LIST DOCD IN RCRD: CPT | Mod: CPTII,S$GLB,, | Performed by: UROLOGY

## 2024-02-27 PROCEDURE — 99214 OFFICE O/P EST MOD 30 MIN: CPT | Mod: S$GLB,,, | Performed by: ORTHOPAEDIC SURGERY

## 2024-02-27 PROCEDURE — 99214 OFFICE O/P EST MOD 30 MIN: CPT | Mod: S$GLB,,, | Performed by: UROLOGY

## 2024-02-27 PROCEDURE — 3008F BODY MASS INDEX DOCD: CPT | Mod: CPTII,S$GLB,, | Performed by: ORTHOPAEDIC SURGERY

## 2024-02-27 RX ORDER — FINASTERIDE 5 MG/1
5 TABLET, FILM COATED ORAL DAILY
Qty: 90 TABLET | Refills: 3 | Status: SHIPPED | OUTPATIENT
Start: 2024-02-27 | End: 2025-02-26

## 2024-02-27 RX ORDER — TAMSULOSIN HYDROCHLORIDE 0.4 MG/1
2 CAPSULE ORAL DAILY
Qty: 180 CAPSULE | Refills: 3 | Status: SHIPPED | OUTPATIENT
Start: 2024-02-27

## 2024-02-27 RX ORDER — SILDENAFIL 100 MG/1
100 TABLET, FILM COATED ORAL DAILY PRN
Qty: 10 TABLET | Refills: 11 | Status: ON HOLD | OUTPATIENT
Start: 2024-02-27 | End: 2024-04-19 | Stop reason: HOSPADM

## 2024-02-27 NOTE — PROGRESS NOTES
Subjective:       Patient ID: Miguel Moore is a 68 y.o. male The patient's last visit with me was on 11/7/2023.     Chief Complaint:   Chief Complaint   Patient presents with    Follow-up     Hematuria  Patient complains of gross hematuria. Onset of hematuria was a few months ago and was gradual in onset. There is not a history of nephrolithiasis. There is not a history of urologic trauma. Other urologic symptoms include slow stream, hesitancy, intermittency, nocturia. Patient admits to history of no risk factors for cancer. Patient denies history of Agent Orange exposure, chronic Nunez catheter,  surgeries, occupational exposure, sexually transmitted diseases, tobacco use, trauma and urolithiasis. Prior workup has been none.  He had a CT in January when he was hospitalized with renal failure.    He had a Cystoscopy with simple UDS in 2017 by Dr. Chambers.  This showed an enlarged prostate and incomplete bladder emptying.    He is now on MWF dialysis.    9/15/2022  He had a UTI in August 2022.  He had a negative prostate biopsy in February 2021 for a PSA 10.2.  He thinks he has a UTI due to a strong smell and dark color.     5/2/2023  He has noted some straining for the past week.  The odor is off.    8/1/2023  He was given antibiotics last visit.  He is improved after antibiotics.  He has noted the odor is returning.      11/7/2023  He is back with a new PSA.  He tells me his stream is okay.  He is still taking Flomax and Proscar.    02/27/2024  He is back with a MRI.  He has noted an odor to his urine.  He feels he has an odor currently.        ACTIVE MEDICAL ISSUES:  Patient Active Problem List   Diagnosis    Benign essential HTN    Dyslipidemia    Controlled type 2 diabetes mellitus with chronic kidney disease on chronic dialysis, with long-term current use of insulin    BPH (benign prostatic hyperplasia) 2/18/21 prostate bx normal    Seizure disorder as sequela of cerebrovascular accident    Hemiplegia and  hemiparesis following cerebral infarction affecting right dominant side    Microcytosis 9/2019 labs c/w AoCD and AoCKD    ESRD (end stage renal disease)    Nuclear sclerosis, left    Cortical cataract of both eyes    DM type 2 without retinopathy    Secondary hyperparathyroidism of renal origin    Vitamin D deficiency    Right sided weakness    Senile nuclear sclerosis    CME (cystoid macular edema), bilateral    Refractive error    History of stroke    Type 2 diabetes mellitus with diabetic neuropathy, with long-term current use of insulin    Nephrotic syndrome    Anasarca    Hypocalcemia    History of fungal infection candida parasilosis hospitalization 8/2020    Chronic deep vein thrombosis (DVT) of popliteal vein of right lower extremity 9/21/20 outside US; unprovoked; anticoagulation    Pancytopenia    Elevated liver enzymes    Pulmonary nodule 1/2021 4 mm nodule.    Hematuria    Elevated PSA 2/18/21 prostate bx normal    Anemia in chronic kidney disease    History of diabetic ulcer of foot    Pseudophakia    Bilateral posterior capsular opacification       ALLERGIES AND MEDICATIONS: updated and reviewed.  Review of patient's allergies indicates:   Allergen Reactions    Ace inhibitors      Hyperkalemia 8/2018  Other reaction(s): Unknown    Penicillins Hives    Tizanidine      Felt hot     Current Outpatient Medications   Medication Sig    amLODIPine (NORVASC) 10 MG tablet Take 1 tablet by mouth once daily    apixaban (ELIQUIS) 5 mg Tab Take 1 tablet (5 mg total) by mouth 2 (two) times daily.    aspirin (ECOTRIN) 81 MG EC tablet Take 1 tablet (81 mg total) by mouth once daily.    atorvastatin (LIPITOR) 80 MG tablet Take 1 tablet (80 mg total) by mouth once daily.    blood sugar diagnostic Strp To check BG 1 times daily, to use with insurance preferred meter    blood-glucose meter kit To check BG 1 times daily, to use with insurance preferred meter    cholecalciferol, vitamin D3, (VITAMIN D3) 25 mcg (1,000 unit)  capsule Take 1 capsule (1,000 Units total) by mouth once daily.    clotrimazole-betamethasone 1-0.05% (LOTRISONE) cream Apply topically 2 (two) times daily.    hydrocortisone (WESTCORT) 0.2 % cream Apply topically 2 (two) times daily.    iron sucrose in NS (VENOFER) 100 mg/100 mL PgBk 50 mg.    labetaloL (NORMODYNE) 100 MG tablet Take 1 tablet (100 mg total) by mouth once daily.    lancets Misc To check BG 1 times daily, to use with insurance preferred meter    methoxy peg-epoetin beta (MIRCERA INJ) 75 mcg.    mupirocin (BACTROBAN) 2 % ointment Apply to affected area 3 times daily    RENVELA 800 mg Tab Take 1 tablet (800 mg total) by mouth 3 (three) times daily with meals.    finasteride (PROSCAR) 5 mg tablet Take 1 tablet (5 mg total) by mouth once daily.    levETIRAcetam (KEPPRA) 250 MG Tab Take 1 tablet (250 mg total) by mouth 2 (two) times daily on Tuesday, Thursday, Saturday & Sunday. Take 2 tablets (500mg total) by mouth in the morning and 1 tablet (250mg total) in the evening on dialysis days (Monday, Wednesday, & Friday)    sildenafiL (VIAGRA) 100 MG tablet Take 1 tablet (100 mg total) by mouth daily as needed.    tamsulosin (FLOMAX) 0.4 mg Cap Take 2 capsules (0.8 mg total) by mouth once daily.     No current facility-administered medications for this visit.       Review of Systems   Constitutional:  Negative for activity change, fatigue, fever and unexpected weight change.   HENT:  Negative for congestion.    Eyes:  Negative for redness.   Respiratory:  Negative for chest tightness and shortness of breath.    Cardiovascular:  Negative for chest pain and leg swelling.   Gastrointestinal:  Negative for abdominal pain, constipation, diarrhea, nausea and vomiting.   Genitourinary:  Negative for dysuria, flank pain, frequency, hematuria, penile pain, penile swelling, scrotal swelling, testicular pain and urgency.   Musculoskeletal:  Negative for arthralgias and back pain.   Neurological:  Negative for dizziness  and light-headedness.   Psychiatric/Behavioral:  Negative for behavioral problems and confusion. The patient is not nervous/anxious.    All other systems reviewed and are negative.      Objective:      There were no vitals filed for this visit.      Physical Exam  Vitals and nursing note reviewed.   Constitutional:       Appearance: He is well-developed.   HENT:      Head: Normocephalic.   Eyes:      Conjunctiva/sclera: Conjunctivae normal.   Neck:      Thyroid: No thyromegaly.      Trachea: No tracheal deviation.   Cardiovascular:      Rate and Rhythm: Normal rate.      Heart sounds: Normal heart sounds.   Pulmonary:      Effort: Pulmonary effort is normal. No respiratory distress.      Breath sounds: Normal breath sounds. No wheezing.   Abdominal:      General: Bowel sounds are normal.      Palpations: Abdomen is soft.      Tenderness: There is no abdominal tenderness. There is no rebound.      Hernia: No hernia is present.   Musculoskeletal:         General: No tenderness. Normal range of motion.      Cervical back: Normal range of motion and neck supple.   Lymphadenopathy:      Cervical: No cervical adenopathy.   Skin:     General: Skin is warm and dry.      Findings: No erythema or rash.   Neurological:      Mental Status: He is alert and oriented to person, place, and time.   Psychiatric:         Behavior: Behavior normal.         Thought Content: Thought content normal.         Judgment: Judgment normal.         Urine dipstick shows not done.  Micro exam: not done.     Component Ref Range & Units 12 d ago 2 yr ago   PSA Diagnostic 0.00 - 4.00 ng/mL 13.7 High   16.7 High  CM    Comment: The testing method is a chemiluminescent microparticle immunoassay   manufactured by [employer] and performed on the DEXMA   or   PolyRemedy system. Values obtained with different assay manufacturers   for   methods may be different and cannot be used interchangeably.   PSA Expected levels:   Hormonal Therapy:  <0.05 ng/ml   Prostatectomy: <0.01 ng/ml   Radiation Therapy: <1.00 ng/ml    Resulting Agency  [facility]              Specimen Collected: 10/26/23 14:39 Last Resulted: 10/26/23 21:13           MRI Pelvis Without Contrast  Order: [id number]  Status: Final result       Visible to patient: No (inaccessible in Patient Portal)       Next appt: Today at 02:30 PM in Urology (Lee Styles MD)       Dx: Elevated PSA    1 Result Note  Details    Reading Physician Reading Date Result Priority   Wander Ontiveros MD  [phone] 1/25/2024 Routine     Narrative & Impression  EXAMINATION:  MRI PELVIS WITHOUT CONTRAST     CLINICAL HISTORY:  Prostate cancer suspected;  Elevated prostate specific antigen (PSA)     Additional history: None provided.     TECHNIQUE:  Multiparametric MRI of the prostate/pelvis performed on a 3T scanner with phase pelvic coil. Multiplanar, multisequence images including high resolution, small field-of-view T2-WI; axial diffusion weighted images with multiple B-values and creation of ADC-maps; and dynamic contrast enhanced T1-weighted images through the prostate were obtained without contrast.     COMPARISON:  CT 01/06/2021     FINDINGS:  Previous biopsy: Negative biopsy 02/18/2021     PSA: 13.7 ng/mL 10/26/2023     Prior therapy: None     Prostate: 5.6 x 5.5 x 7.2 cm corresponding to a computed volume of 116 cc.     Peripheral zone: Diffuse thinning.  No focal abnormalities with imaging features concerning for prostate cancer, score 1.     Transitional zone: Benign prostatic hyperplasia without focal suspicious abnormality, score 2.  Superior protrusion of the prostate at the bladder base.     Neurovascular bundle: Normal appearance.     Seminal vesicles: T1 signal hyperintensity within the right seminal vesicle which could represent hemorrhagic or proteinaceous material.     Adjacent Organ Involvement: No evidence for urinary bladder or rectal invasion.     Lymphadenopathy: None.     Other  Findings: Circumferential bladder wall thickening, likely sequela of chronic outlet obstruction.  Trace pelvic free fluid.  Nonspecific diffuse heterogeneous marrow signal.     Impression:     1. No suspicious prostate lesion.  2. BPH.  3. Signal within the right seminal vesicle may represent hemorrhagic or proteinaceous material.  4. Additional findings as above.  Overall Assessment: PI-RADS 2 - Low (clinically significant cancer is unlikely to be present)     Number of targets created for potential MR/US fusion biopsy     Peripheral zone: 0     Transition zone: 0        Electronically signed by: Wander Ontiveros  Date:                                            01/25/2024  Time:                                           16:51           Exam Ended: 01/25/24 16:30 CST             Assessment:       1. Elevated PSA    2. BPH with obstruction/lower urinary tract symptoms    3. Nocturia more than twice per night    4. Benign non-nodular prostatic hyperplasia without lower urinary tract symptoms    5. Other male erectile dysfunction                  Plan:       1. Elevated PSA    - Prostate Specific Antigen, Diagnostic; Future    2. BPH with obstruction/lower urinary tract symptoms    - Urine culture; Future    3. Nocturia more than twice per night      4. Benign non-nodular prostatic hyperplasia without lower urinary tract symptoms    - tamsulosin (FLOMAX) 0.4 mg Cap; Take 2 capsules (0.8 mg total) by mouth once daily.  Dispense: 180 capsule; Refill: 3  - finasteride (PROSCAR) 5 mg tablet; Take 1 tablet (5 mg total) by mouth once daily.  Dispense: 90 tablet; Refill: 3    5. Other male erectile dysfunction    - sildenafiL (VIAGRA) 100 MG tablet; Take 1 tablet (100 mg total) by mouth daily as needed.  Dispense: 10 tablet; Refill: 11              Follow up in about 6 months (around 8/27/2024) for Follow up Established, Review PSA.

## 2024-02-27 NOTE — PROGRESS NOTES
Subjective:      Patient ID: Miguel Moore is a 68 y.o. male.    Chief Complaint: Swelling and Numbness of the Left Hand      HPI  Miguel Moore is a left hand dominant 68 y.o. male presenting today for left index finger nail osteomyelitis. Patient states he was opening a coke can and his nail was hanging on so he pulled it off. He saw Dr Monsivais and was put an antibiotics. He is on dialysis, has diabetes, denies heart problems. Patient is a non smoker. Patient uses a wheelchair due to a stroke. He is right sided paralyzed     Review of patient's allergies indicates:   Allergen Reactions    Ace inhibitors      Hyperkalemia 8/2018  Other reaction(s): Unknown    Penicillins Hives    Tizanidine      Felt hot         Current Outpatient Medications   Medication Sig Dispense Refill    amLODIPine (NORVASC) 10 MG tablet Take 1 tablet by mouth once daily 90 tablet 3    apixaban (ELIQUIS) 5 mg Tab Take 1 tablet (5 mg total) by mouth 2 (two) times daily. 180 tablet 3    aspirin (ECOTRIN) 81 MG EC tablet Take 1 tablet (81 mg total) by mouth once daily. 90 tablet 3    atorvastatin (LIPITOR) 80 MG tablet Take 1 tablet (80 mg total) by mouth once daily. 90 tablet 3    blood sugar diagnostic Strp To check BG 1 times daily, to use with insurance preferred meter 100 strip 3    blood-glucose meter kit To check BG 1 times daily, to use with insurance preferred meter 1 each 0    cholecalciferol, vitamin D3, (VITAMIN D3) 25 mcg (1,000 unit) capsule Take 1 capsule (1,000 Units total) by mouth once daily. 90 capsule 3    clotrimazole-betamethasone 1-0.05% (LOTRISONE) cream Apply topically 2 (two) times daily. 15 g 0    finasteride (PROSCAR) 5 mg tablet Take 1 tablet (5 mg total) by mouth once daily. 90 tablet 3    hydrocortisone (WESTCORT) 0.2 % cream Apply topically 2 (two) times daily. 15 g 0    iron sucrose in NS (VENOFER) 100 mg/100 mL PgBk 50 mg.      labetaloL (NORMODYNE) 100 MG tablet Take 1 tablet (100 mg total) by mouth once  daily. 90 tablet 3    lancets Misc To check BG 1 times daily, to use with insurance preferred meter 100 each 4    methoxy peg-epoetin beta (MIRCERA INJ) 75 mcg.      mupirocin (BACTROBAN) 2 % ointment Apply to affected area 3 times daily 22 g 1    RENVELA 800 mg Tab Take 1 tablet (800 mg total) by mouth 3 (three) times daily with meals. 90 tablet 0    tamsulosin (FLOMAX) 0.4 mg Cap Take 2 capsules (0.8 mg total) by mouth once daily. 180 capsule 3    levETIRAcetam (KEPPRA) 250 MG Tab Take 1 tablet (250 mg total) by mouth 2 (two) times daily on Tuesday, Thursday, Saturday & Sunday. Take 2 tablets (500mg total) by mouth in the morning and 1 tablet (250mg total) in the evening on dialysis days (Monday, Wednesday, & Friday) 80 tablet 11     No current facility-administered medications for this visit.       Past Medical History:   Diagnosis Date    Allergy     Clotting disorder     Elaquis and APlavix    Deep vein thrombosis     Diabetes mellitus, type 2     Hypertension     Nuclear sclerosis of both eyes 6/9/2017    Renal disorder     Seizures     Stroke     Urinary tract infection        Past Surgical History:   Procedure Laterality Date    CATARACT EXTRACTION W/  INTRAOCULAR LENS IMPLANT Right 10/05/2017    Dr. Tay    CATARACT EXTRACTION W/  INTRAOCULAR LENS IMPLANT Left 10/19/2017    Dr. Tay    CYSTOSCOPY W/ RETROGRADES Bilateral 2/18/2021    Procedure: CYSTOSCOPY, WITH RETROGRADE PYELOGRAM;  Surgeon: JEMMA Styles MD;  Location: [facility] OR;  Service: Urology;  Laterality: Bilateral;  REQUESTING EARLY AS POSSIBE-LO / 2/8/2021 @ 1:13PM  RN Pre Op 2-11-21, Covid NEGATIVE ON  2-17-21.  C A    ESOPHAGOGASTRODUODENOSCOPY N/A 8/17/2020    Procedure: EGD (ESOPHAGOGASTRODUODENOSCOPY);  Surgeon: Desmond Chapman MD;  Location: [facility] ENDO;  Service: Endoscopy;  Laterality: N/A;    EYE SURGERY Bilateral     cataract    FEMORAL ARTERY STENT      FRACTURE SURGERY      KNEE SURGERY      bilateral scope       Review  "of Systems:  Constitutional: Negative for chills and fever.   Respiratory: Negative for cough and shortness of breath.    Gastrointestinal: Negative for nausea and vomiting.   Skin: Negative for rash.   Neurological: Negative for dizziness and headaches.   Psychiatric/Behavioral: Negative for depression.   MSK as in HPI       OBJECTIVE:     PHYSICAL EXAM:  Ht 5' 8" (1.727 m)   Wt 79.8 kg (175 lb 14.8 oz)   BMI 26.75 kg/m²     GEN:  NAD, well-developed, well-groomed.  NEURO: Awake, alert, and oriented. Normal attention and concentration.    PSYCH: Normal mood and affect. Behavior is normal.  HEENT: No cervical lymphadenopathy noted.  CARDIOVASCULAR: Radial pulses 2+ bilaterally. No LE edema noted.  PULMONARY: Breath sounds normal. No respiratory distress.  SKIN: Intact, no rashes.      MSK:     RUE:  Good active ROM of the wrist and fingers. AIN/PIN/Radial/Median/Ulnar Nerves assessed in isolation without deficit. Radial & Ulnar arteries palpated 2+. Capillary Refill <3s.    LUE:  Good active ROM of the wrist and fingers. AIN/PIN/Radial/Median/Ulnar Nerves assessed in isolation without deficit. Radial & Ulnar arteries palpated 2+. Capillary Refill <3s.  Mild swelling noted left index finger  Nail bed deformity, images in media     RADIOGRAPHS:  Lucency of the distal aspect of the distal phalanx of the 2nd digit. Findings may be seen with osteomyelitis or acro-osteolysis. Differential considerations include scleroderma, Raynaud's, or psoriatic arthritis. Remaining osseous structures are intact. Linear radiopaque density is again visualized within the hand suspicious for a foreign body.   Comments: I have personally reviewed the imaging and I agree with the above radiologist's report.    ASSESSMENT/PLAN:   No diagnosis found.  68 yr old male with a left index nail bed deformity      No orders of the defined types were placed in this encounter.       Plan:   MRI f/u after  Referral to infectious disease   Patient has " chronic kidney disease and therefore the Bactrim is not recommended like Infectious Disease to weigh in on antibiotic usage both before surgery after surgery the plan will be either curettage of the infected bone or if MRI shows more distal phalanx involvement we will need to do a fingertip amputation but I would like that MRI prior to surgery    The patient indicates understanding of these issues and agrees to the plan.    Tara Medeiros ATC, [facility]  [facility]   [facility]  [place]

## 2024-02-28 ENCOUNTER — LAB VISIT (OUTPATIENT)
Dept: LAB | Facility: HOSPITAL | Age: 69
End: 2024-02-28
Attending: UROLOGY
Payer: MEDICARE

## 2024-02-28 DIAGNOSIS — N40.1 BPH WITH OBSTRUCTION/LOWER URINARY TRACT SYMPTOMS: ICD-10-CM

## 2024-02-28 DIAGNOSIS — N13.8 BPH WITH OBSTRUCTION/LOWER URINARY TRACT SYMPTOMS: ICD-10-CM

## 2024-02-28 PROCEDURE — 87186 SC STD MICRODIL/AGAR DIL: CPT | Performed by: UROLOGY

## 2024-02-28 PROCEDURE — 87086 URINE CULTURE/COLONY COUNT: CPT | Performed by: UROLOGY

## 2024-02-28 PROCEDURE — 87077 CULTURE AEROBIC IDENTIFY: CPT | Performed by: UROLOGY

## 2024-02-28 PROCEDURE — 87088 URINE BACTERIA CULTURE: CPT | Performed by: UROLOGY

## 2024-03-02 LAB — BACTERIA UR CULT: ABNORMAL

## 2024-03-04 ENCOUNTER — TELEPHONE (OUTPATIENT)
Dept: UROLOGY | Facility: CLINIC | Age: 69
End: 2024-03-04
Payer: MEDICARE

## 2024-03-04 RX ORDER — CIPROFLOXACIN 500 MG/1
500 TABLET ORAL 2 TIMES DAILY
Qty: 14 TABLET | Refills: 0 | Status: SHIPPED | OUTPATIENT
Start: 2024-03-04 | End: 2024-03-12

## 2024-03-04 NOTE — TELEPHONE ENCOUNTER
----- Message from Roxanne Rod MD sent at 3/4/2024 10:30 AM CST -----  Please inform patient of urinary tract infection on recent urine culture. Appropriate antibiotics (Cipro) were sent in to the pharmacy. (Jensen pt)

## 2024-03-07 ENCOUNTER — OFFICE VISIT (OUTPATIENT)
Dept: PODIATRY | Facility: CLINIC | Age: 69
End: 2024-03-07
Attending: ORTHOPAEDIC SURGERY
Payer: MEDICARE

## 2024-03-07 VITALS
HEIGHT: 68 IN | DIASTOLIC BLOOD PRESSURE: 65 MMHG | BODY MASS INDEX: 26.66 KG/M2 | WEIGHT: 175.94 LBS | SYSTOLIC BLOOD PRESSURE: 129 MMHG

## 2024-03-07 DIAGNOSIS — N18.6 CONTROLLED TYPE 2 DIABETES MELLITUS WITH CHRONIC KIDNEY DISEASE ON CHRONIC DIALYSIS, WITH LONG-TERM CURRENT USE OF INSULIN: Primary | ICD-10-CM

## 2024-03-07 DIAGNOSIS — B35.1 ONYCHOMYCOSIS DUE TO DERMATOPHYTE: ICD-10-CM

## 2024-03-07 DIAGNOSIS — E11.22 CONTROLLED TYPE 2 DIABETES MELLITUS WITH CHRONIC KIDNEY DISEASE ON CHRONIC DIALYSIS, WITH LONG-TERM CURRENT USE OF INSULIN: Primary | ICD-10-CM

## 2024-03-07 DIAGNOSIS — Z79.4 CONTROLLED TYPE 2 DIABETES MELLITUS WITH CHRONIC KIDNEY DISEASE ON CHRONIC DIALYSIS, WITH LONG-TERM CURRENT USE OF INSULIN: Primary | ICD-10-CM

## 2024-03-07 DIAGNOSIS — L84 CORN OR CALLUS: ICD-10-CM

## 2024-03-07 DIAGNOSIS — Z99.2 CONTROLLED TYPE 2 DIABETES MELLITUS WITH CHRONIC KIDNEY DISEASE ON CHRONIC DIALYSIS, WITH LONG-TERM CURRENT USE OF INSULIN: Primary | ICD-10-CM

## 2024-03-07 PROCEDURE — 99999 PR PBB SHADOW E&M-EST. PATIENT-LVL IV: CPT | Mod: PBBFAC,,, | Performed by: PODIATRIST

## 2024-03-07 PROCEDURE — 11056 PARNG/CUTG B9 HYPRKR LES 2-4: CPT | Mod: Q9,S$GLB,, | Performed by: PODIATRIST

## 2024-03-07 PROCEDURE — 99499 UNLISTED E&M SERVICE: CPT | Mod: S$GLB,,, | Performed by: PODIATRIST

## 2024-03-07 PROCEDURE — 11721 DEBRIDE NAIL 6 OR MORE: CPT | Mod: 59,Q9,S$GLB, | Performed by: PODIATRIST

## 2024-03-07 NOTE — PROGRESS NOTES
Subjective:     Patient ID: Miguel Moore is a 68 y.o. male.    Chief Complaint: Diabetes Mellitus (2/6/24 NP Aide) and Nail Care    Miguel is a 68 y.o. male who presents to the clinic for evaluation and treatment of high risk feet. Miguel has a past medical history of Allergy, Clotting disorder, Deep vein thrombosis, Diabetes mellitus, type 2, Hypertension, Nuclear sclerosis of both eyes (6/9/2017), Renal disorder, Seizures, Stroke, and Urinary tract infection. The patient's chief complaint is long, thick toenails. This patient has documented high risk feet requiring routine maintenance secondary to peripheral neuropathy.    PCP: Raciel Raymond MD    Date Last Seen by PCP: per above    Current shoe gear:  Affected Foot: Tennis shoes     Unaffected Foot: Tennis shoes    Hemoglobin A1C   Date Value Ref Range Status   10/26/2023 5.9 (H) 4.0 - 5.6 % Final     Comment:     ADA Screening Guidelines:  5.7-6.4%  Consistent with prediabetes  >or=6.5%  Consistent with diabetes    High levels of fetal hemoglobin interfere with the HbA1C  assay. Heterozygous hemoglobin variants (HbS, HgC, etc)do  not significantly interfere with this assay.   However, presence of multiple variants may affect accuracy.     04/27/2023 5.9 (H) 4.0 - 5.6 % Final     Comment:     ADA Screening Guidelines:  5.7-6.4%  Consistent with prediabetes  >or=6.5%  Consistent with diabetes    High levels of fetal hemoglobin interfere with the HbA1C  assay. Heterozygous hemoglobin variants (HbS, HgC, etc)do  not significantly interfere with this assay.   However, presence of multiple variants may affect accuracy.     11/02/2021 5.9 (H) 4.0 - 5.6 % Final     Comment:     ADA Screening Guidelines:  5.7-6.4%  Consistent with prediabetes  >or=6.5%  Consistent with diabetes    High levels of fetal hemoglobin interfere with the HbA1C  assay. Heterozygous hemoglobin variants (HbS, HgC, etc)do  not significantly interfere with this assay.   However, presence of  multiple variants may affect accuracy.         Review of Systems   Constitutional: Negative for chills.   Cardiovascular:  Negative for chest pain and claudication.   Respiratory:  Negative for cough.    Skin:  Positive for color change, dry skin and nail changes.   Musculoskeletal:  Positive for joint pain.   Gastrointestinal:  Negative for nausea.   Neurological:  Positive for paresthesias. Negative for numbness.   Psychiatric/Behavioral:  The patient is not nervous/anxious.         Objective:     Physical Exam  Constitutional:       Appearance: He is well-developed.   Cardiovascular:      Comments: Dorsalis pedis and posterior tibial pulses are diminished Bilaterally. Toes are cool to touch. Feet are warm proximally.There is decreased digital hair. Skin is atrophic, slightly hyperpigmented, and mildly edematous   Pulmonary:      Effort: No respiratory distress.   Musculoskeletal:         General: Tenderness present.      Comments: Adequate joint range of motion without pain, limitation, nor crepitation Bilateral feet and ankle joints. Muscle strength is 5/5 in all groups bilaterally.   Decreased stride, station of gait.  apropulsive toe off.  Increased angle and base of gait.      Patient has hammertoes of digits 2-5 bilateral partially reducible without symptom today.     Visible and palpable bunion without pain at dorsomedial 1st metatarsal head right and left.  Hallux abducted right and left partially reducible, tracks laterally without being track bound.  No ecchymosis, erythema, edema, or cardinal signs infection or signs of trauma same foot.         Feet:      Right foot:      Skin integrity: Callus and dry skin present. No ulcer or skin breakdown.      Left foot:      Skin integrity: Dry skin present. No ulcer.   Skin:     General: Skin is dry.      Findings: No erythema.      Comments: Nails x10 are elongated by 2-6mm's, thickened by 3-5 mm's, dystrophic, and are darkened in coloration . Xerosis  Bilaterally. No open lesions noted   Focal hyperkeratotic lesion consisting entirely of hyperkeratotic tissue without open skin, drainage, pus, fluctuance, malodor, or signs of infection: right heel, right great toe     Neurological:      Mental Status: He is alert.      Comments: [place]-Alex 5.07 monofilamant testing is diminished Desmond feet. Sharp/dull sensation diminished Bilaterally. Light touch absent Bilaterally.            Assessment:      Encounter Diagnoses   Name Primary?    Controlled type 2 diabetes mellitus with chronic kidney disease on chronic dialysis, with long-term current use of insulin Yes    Onychomycosis due to dermatophyte     Corn or callus          Plan:     Miguel was seen today for diabetes mellitus and nail care.    Diagnoses and all orders for this visit:    Controlled type 2 diabetes mellitus with chronic kidney disease on chronic dialysis, with long-term current use of insulin    Onychomycosis due to dermatophyte    Corn or callus          I counseled the patient on his conditions, their implications and medical management.    - Shoe inspection. Diabetic Foot Education. Patient reminded of the importance of good nutrition and blood sugar control to help prevent podiatric complications of diabetes. Patient instructed on proper foot hygeine. We discussed wearing proper shoe gear, daily foot inspections, never walking without protective shoe gear, never putting sharp instruments to feet, routine podiatric nail visits every 2-3 months.   - With patient's permission, nails were aggressively reduced and debrided x 10 to their soft tissue attachment mechanically and with electric , removing all offending nail and debris. Patient relates relief following the procedure. He will continue to monitor the areas daily, inspect his feet, wear protective shoe gear when ambulatory, moisturizer to maintain skin integrity and follow in this office in approximately 2-3 months, sooner p.r.n.   -  After cleansing the area w/ alcohol prep pad the above mentioned hyperkeratosis was trimmed utilizing No 15 scapel, to a smooth base with out incident. Right heel, right great toe

## 2024-03-12 ENCOUNTER — TELEPHONE (OUTPATIENT)
Dept: UROLOGY | Facility: CLINIC | Age: 69
End: 2024-03-12
Payer: MEDICARE

## 2024-03-12 DIAGNOSIS — R35.1 NOCTURIA MORE THAN TWICE PER NIGHT: Primary | ICD-10-CM

## 2024-03-12 RX ORDER — SULFAMETHOXAZOLE AND TRIMETHOPRIM 800; 160 MG/1; MG/1
1 TABLET ORAL 2 TIMES DAILY
Qty: 6 TABLET | Refills: 0 | Status: SHIPPED | OUTPATIENT
Start: 2024-03-12 | End: 2024-03-15

## 2024-03-12 NOTE — TELEPHONE ENCOUNTER
Cipro was sent in on 3/4 and was only for a week.  He should have taken enough medication to treat the infection.    No new medications at this time.

## 2024-03-12 NOTE — TELEPHONE ENCOUNTER
----- Message from Deepti Vera MA sent at 3/12/2024 10:30 AM CDT -----  Please advise   ----- Message -----  From: Rosy Bean  Sent: 3/12/2024   9:54 AM CDT  To: [facility] Staff    .Type: Patient Call Back    Who called: Self     What is the request in detail: Stated the medication ciprofloxacin HCl (CIPRO) 500 MG tablet is making his legs heavy    Can the clinic reply by [facility]? No     Would the patient rather a call back or a response via [facility]? Call Back     Best call back number:.[phone] (home)       Additional Information:

## 2024-03-14 ENCOUNTER — TELEPHONE (OUTPATIENT)
Dept: UROLOGY | Facility: HOSPITAL | Age: 69
End: 2024-03-14
Payer: MEDICARE

## 2024-03-14 NOTE — TELEPHONE ENCOUNTER
"----- Message from Deepti Vera MA sent at 3/14/2024  2:33 PM CDT -----  Regarding: FW: Patient Advice    ----- Message -----  From: Patti Hanna  Sent: 3/14/2024   8:29 AM CDT  To: [facility] Staff  Subject: Patient Advice                                               Name of Who is Calling:  Miguel Moore    Who Left The Message:  Miguel Moore      What is the request in detail:      Patient called requesting a call back as this regards the side effects cause by the  prescribed medication Ciprofloxacin 500MG table. Patient states,  "the medication makes his legs feel heavy."  Please give the patient a call back at your earliest convenience and further advise.   Thank you      Reply by [facility]: NO      Preferred Call Back :  [phone] (B)                                            "

## 2024-03-20 ENCOUNTER — ANESTHESIA EVENT (OUTPATIENT)
Dept: INTENSIVE CARE | Facility: HOSPITAL | Age: 69
DRG: 264 | End: 2024-03-20
Payer: MEDICARE

## 2024-03-20 ENCOUNTER — HOSPITAL ENCOUNTER (INPATIENT)
Facility: HOSPITAL | Age: 69
LOS: 30 days | Discharge: HOME HEALTH | DRG: 264 | End: 2024-04-19
Attending: EMERGENCY MEDICINE | Admitting: HOSPITALIST
Payer: MEDICARE

## 2024-03-20 ENCOUNTER — ANESTHESIA (OUTPATIENT)
Dept: INTENSIVE CARE | Facility: HOSPITAL | Age: 69
DRG: 264 | End: 2024-03-20
Payer: MEDICARE

## 2024-03-20 DIAGNOSIS — M79.89 LEG SWELLING: ICD-10-CM

## 2024-03-20 DIAGNOSIS — R50.9 FEVER: ICD-10-CM

## 2024-03-20 DIAGNOSIS — R07.9 CHEST PAIN: ICD-10-CM

## 2024-03-20 DIAGNOSIS — I48.91 ATRIAL FIBRILLATION WITH RVR: ICD-10-CM

## 2024-03-20 DIAGNOSIS — N18.6 ANEMIA IN CHRONIC KIDNEY DISEASE, ON CHRONIC DIALYSIS: ICD-10-CM

## 2024-03-20 DIAGNOSIS — I48.91 A-FIB: ICD-10-CM

## 2024-03-20 DIAGNOSIS — I48.0 PAROXYSMAL ATRIAL FIBRILLATION WITH RVR: ICD-10-CM

## 2024-03-20 DIAGNOSIS — M79.606 LEG PAIN: ICD-10-CM

## 2024-03-20 DIAGNOSIS — Z86.73 HISTORY OF STROKE: ICD-10-CM

## 2024-03-20 DIAGNOSIS — N18.6 ESRD (END STAGE RENAL DISEASE): ICD-10-CM

## 2024-03-20 DIAGNOSIS — T14.8XXA OPEN WOUND: ICD-10-CM

## 2024-03-20 DIAGNOSIS — L03.115 CELLULITIS OF RIGHT FOOT: ICD-10-CM

## 2024-03-20 DIAGNOSIS — I48.19 PERSISTENT ATRIAL FIBRILLATION: ICD-10-CM

## 2024-03-20 DIAGNOSIS — Z99.2 ANEMIA IN CHRONIC KIDNEY DISEASE, ON CHRONIC DIALYSIS: ICD-10-CM

## 2024-03-20 DIAGNOSIS — D63.1 ANEMIA IN CHRONIC KIDNEY DISEASE, ON CHRONIC DIALYSIS: ICD-10-CM

## 2024-03-20 DIAGNOSIS — L03.115 CELLULITIS OF RIGHT FOOT: Primary | ICD-10-CM

## 2024-03-20 DIAGNOSIS — R53.1 RIGHT SIDED WEAKNESS: ICD-10-CM

## 2024-03-20 PROBLEM — R74.8 ELEVATED LIVER ENZYMES: Status: RESOLVED | Noted: 2021-01-04 | Resolved: 2024-03-20

## 2024-03-20 PROBLEM — E83.51 HYPOCALCEMIA: Status: RESOLVED | Noted: 2020-08-16 | Resolved: 2024-03-20

## 2024-03-20 PROBLEM — R60.1 ANASARCA: Status: RESOLVED | Noted: 2020-08-16 | Resolved: 2024-03-20

## 2024-03-20 PROBLEM — D61.818 PANCYTOPENIA: Status: RESOLVED | Noted: 2021-01-04 | Resolved: 2024-03-20

## 2024-03-20 PROBLEM — R31.9 HEMATURIA: Status: RESOLVED | Noted: 2021-01-10 | Resolved: 2024-03-20

## 2024-03-20 PROBLEM — M25.571 RIGHT ANKLE PAIN: Status: ACTIVE | Noted: 2024-03-20

## 2024-03-20 LAB
ALBUMIN SERPL BCP-MCNC: 2.7 G/DL (ref 3.5–5.2)
ALP SERPL-CCNC: 61 U/L (ref 55–135)
ALT SERPL W/O P-5'-P-CCNC: 12 U/L (ref 10–44)
ANION GAP SERPL CALC-SCNC: 15 MMOL/L (ref 8–16)
AST SERPL-CCNC: 19 U/L (ref 10–40)
BASOPHILS # BLD AUTO: ABNORMAL K/UL (ref 0–0.2)
BASOPHILS NFR BLD: 0 % (ref 0–1.9)
BILIRUB SERPL-MCNC: 1 MG/DL (ref 0.1–1)
BNP SERPL-MCNC: 330 PG/ML (ref 0–99)
BUN SERPL-MCNC: 65 MG/DL (ref 8–23)
CALCIUM SERPL-MCNC: 9.1 MG/DL (ref 8.7–10.5)
CHLORIDE SERPL-SCNC: 93 MMOL/L (ref 95–110)
CO2 SERPL-SCNC: 25 MMOL/L (ref 23–29)
CREAT SERPL-MCNC: 13.8 MG/DL (ref 0.5–1.4)
DIFFERENTIAL METHOD BLD: ABNORMAL
EOSINOPHIL # BLD AUTO: ABNORMAL K/UL (ref 0–0.5)
EOSINOPHIL NFR BLD: 0 % (ref 0–8)
ERYTHROCYTE [DISTWIDTH] IN BLOOD BY AUTOMATED COUNT: 14.4 % (ref 11.5–14.5)
EST. GFR  (NO RACE VARIABLE): 3 ML/MIN/1.73 M^2
GLUCOSE SERPL-MCNC: 103 MG/DL (ref 70–110)
HCT VFR BLD AUTO: 36 % (ref 40–54)
HGB BLD-MCNC: 12.5 G/DL (ref 14–18)
IMM GRANULOCYTES # BLD AUTO: ABNORMAL K/UL (ref 0–0.04)
IMM GRANULOCYTES NFR BLD AUTO: ABNORMAL % (ref 0–0.5)
LACTATE SERPL-SCNC: 1.9 MMOL/L (ref 0.5–2.2)
LYMPHOCYTES # BLD AUTO: ABNORMAL K/UL (ref 1–4.8)
LYMPHOCYTES NFR BLD: 10 % (ref 18–48)
MCH RBC QN AUTO: 27.1 PG (ref 27–31)
MCHC RBC AUTO-ENTMCNC: 34.7 G/DL (ref 32–36)
MCV RBC AUTO: 78 FL (ref 82–98)
MONOCYTES # BLD AUTO: ABNORMAL K/UL (ref 0.3–1)
MONOCYTES NFR BLD: 6 % (ref 4–15)
NEUTROPHILS NFR BLD: 73 % (ref 38–73)
NEUTS BAND NFR BLD MANUAL: 11 %
NRBC BLD-RTO: 0 /100 WBC
PLATELET # BLD AUTO: 74 K/UL (ref 150–450)
PMV BLD AUTO: 9.9 FL (ref 9.2–12.9)
POCT GLUCOSE: 128 MG/DL (ref 70–110)
POTASSIUM SERPL-SCNC: 6 MMOL/L (ref 3.5–5.1)
PROT SERPL-MCNC: 6.3 G/DL (ref 6–8.4)
RBC # BLD AUTO: 4.62 M/UL (ref 4.6–6.2)
SODIUM SERPL-SCNC: 133 MMOL/L (ref 136–145)
TOXIC GRANULES BLD QL SMEAR: PRESENT
TROPONIN I SERPL DL<=0.01 NG/ML-MCNC: 0.11 NG/ML (ref 0–0.03)
TSH SERPL DL<=0.005 MIU/L-ACNC: 2.67 UIU/ML (ref 0.4–4)
URATE SERPL-MCNC: 5 MG/DL (ref 3.4–7)
WBC # BLD AUTO: 8.12 K/UL (ref 3.9–12.7)

## 2024-03-20 PROCEDURE — 93010 ELECTROCARDIOGRAM REPORT: CPT | Mod: HCNC,,, | Performed by: INTERNAL MEDICINE

## 2024-03-20 PROCEDURE — 96375 TX/PRO/DX INJ NEW DRUG ADDON: CPT | Mod: HCNC

## 2024-03-20 PROCEDURE — A4216 STERILE WATER/SALINE, 10 ML: HCPCS | Mod: HCNC | Performed by: HOSPITALIST

## 2024-03-20 PROCEDURE — 5A1D70Z PERFORMANCE OF URINARY FILTRATION, INTERMITTENT, LESS THAN 6 HOURS PER DAY: ICD-10-PCS | Performed by: HOSPITALIST

## 2024-03-20 PROCEDURE — 84550 ASSAY OF BLOOD/URIC ACID: CPT | Mod: HCNC | Performed by: HOSPITALIST

## 2024-03-20 PROCEDURE — 36000 PLACE NEEDLE IN VEIN: CPT | Mod: HCNC | Performed by: ANESTHESIOLOGY

## 2024-03-20 PROCEDURE — 25000003 PHARM REV CODE 250: Mod: HCNC | Performed by: HOSPITALIST

## 2024-03-20 PROCEDURE — 83036 HEMOGLOBIN GLYCOSYLATED A1C: CPT | Mod: HCNC | Performed by: HOSPITALIST

## 2024-03-20 PROCEDURE — 63600175 PHARM REV CODE 636 W HCPCS: Mod: HCNC | Performed by: HOSPITALIST

## 2024-03-20 PROCEDURE — 83880 ASSAY OF NATRIURETIC PEPTIDE: CPT | Mod: HCNC | Performed by: EMERGENCY MEDICINE

## 2024-03-20 PROCEDURE — 93010 ELECTROCARDIOGRAM REPORT: CPT | Mod: HCNC,76,, | Performed by: INTERNAL MEDICINE

## 2024-03-20 PROCEDURE — 96365 THER/PROPH/DIAG IV INF INIT: CPT | Mod: HCNC

## 2024-03-20 PROCEDURE — 63600175 PHARM REV CODE 636 W HCPCS: Mod: HCNC | Performed by: EMERGENCY MEDICINE

## 2024-03-20 PROCEDURE — 80053 COMPREHEN METABOLIC PANEL: CPT | Mod: HCNC | Performed by: EMERGENCY MEDICINE

## 2024-03-20 PROCEDURE — 84484 ASSAY OF TROPONIN QUANT: CPT | Mod: HCNC | Performed by: EMERGENCY MEDICINE

## 2024-03-20 PROCEDURE — 93005 ELECTROCARDIOGRAM TRACING: CPT | Mod: HCNC

## 2024-03-20 PROCEDURE — 05HP33Z INSERTION OF INFUSION DEVICE INTO RIGHT EXTERNAL JUGULAR VEIN, PERCUTANEOUS APPROACH: ICD-10-PCS | Performed by: ANESTHESIOLOGY

## 2024-03-20 PROCEDURE — 25000003 PHARM REV CODE 250: Mod: HCNC | Performed by: ANESTHESIOLOGY

## 2024-03-20 PROCEDURE — 83605 ASSAY OF LACTIC ACID: CPT | Mod: HCNC | Performed by: EMERGENCY MEDICINE

## 2024-03-20 PROCEDURE — 84443 ASSAY THYROID STIM HORMONE: CPT | Mod: HCNC | Performed by: HOSPITALIST

## 2024-03-20 PROCEDURE — 25000003 PHARM REV CODE 250: Mod: HCNC | Performed by: EMERGENCY MEDICINE

## 2024-03-20 PROCEDURE — 99285 EMERGENCY DEPT VISIT HI MDM: CPT | Mod: 25,HCNC

## 2024-03-20 PROCEDURE — 36410 VNPNXR 3YR/> PHY/QHP DX/THER: CPT | Mod: ,,, | Performed by: ANESTHESIOLOGY

## 2024-03-20 PROCEDURE — 87040 BLOOD CULTURE FOR BACTERIA: CPT | Mod: HCNC | Performed by: EMERGENCY MEDICINE

## 2024-03-20 PROCEDURE — 80100014 HC HEMODIALYSIS 1:1: Mod: HCNC

## 2024-03-20 PROCEDURE — 85007 BL SMEAR W/DIFF WBC COUNT: CPT | Mod: HCNC | Performed by: EMERGENCY MEDICINE

## 2024-03-20 PROCEDURE — 96376 TX/PRO/DX INJ SAME DRUG ADON: CPT | Mod: HCNC

## 2024-03-20 PROCEDURE — 20000000 HC ICU ROOM: Mod: HCNC

## 2024-03-20 PROCEDURE — 85027 COMPLETE CBC AUTOMATED: CPT | Mod: HCNC | Performed by: EMERGENCY MEDICINE

## 2024-03-20 RX ORDER — DILTIAZEM HYDROCHLORIDE 5 MG/ML
20 INJECTION INTRAVENOUS
Status: COMPLETED | OUTPATIENT
Start: 2024-03-20 | End: 2024-03-20

## 2024-03-20 RX ORDER — AMOXICILLIN 250 MG
1 CAPSULE ORAL 2 TIMES DAILY
Status: DISCONTINUED | OUTPATIENT
Start: 2024-03-20 | End: 2024-03-29

## 2024-03-20 RX ORDER — SEVELAMER CARBONATE 800 MG/1
800 TABLET, FILM COATED ORAL
Status: DISCONTINUED | OUTPATIENT
Start: 2024-03-20 | End: 2024-03-25

## 2024-03-20 RX ORDER — ONDANSETRON HYDROCHLORIDE 2 MG/ML
4 INJECTION, SOLUTION INTRAVENOUS EVERY 6 HOURS PRN
Status: DISCONTINUED | OUTPATIENT
Start: 2024-03-20 | End: 2024-04-19 | Stop reason: HOSPADM

## 2024-03-20 RX ORDER — PROCHLORPERAZINE EDISYLATE 5 MG/ML
5 INJECTION INTRAMUSCULAR; INTRAVENOUS EVERY 6 HOURS PRN
Status: DISCONTINUED | OUTPATIENT
Start: 2024-03-20 | End: 2024-04-19 | Stop reason: HOSPADM

## 2024-03-20 RX ORDER — IBUPROFEN 200 MG
16 TABLET ORAL
Status: DISCONTINUED | OUTPATIENT
Start: 2024-03-20 | End: 2024-04-19 | Stop reason: HOSPADM

## 2024-03-20 RX ORDER — GLUCAGON 1 MG
1 KIT INJECTION
Status: DISCONTINUED | OUTPATIENT
Start: 2024-03-20 | End: 2024-04-19 | Stop reason: HOSPADM

## 2024-03-20 RX ORDER — FINASTERIDE 5 MG/1
5 TABLET, FILM COATED ORAL DAILY
Status: DISCONTINUED | OUTPATIENT
Start: 2024-03-21 | End: 2024-04-19 | Stop reason: HOSPADM

## 2024-03-20 RX ORDER — ACETAMINOPHEN 325 MG/1
650 TABLET ORAL EVERY 4 HOURS PRN
Status: DISCONTINUED | OUTPATIENT
Start: 2024-03-20 | End: 2024-03-23

## 2024-03-20 RX ORDER — ATORVASTATIN CALCIUM 40 MG/1
80 TABLET, FILM COATED ORAL DAILY
Status: DISCONTINUED | OUTPATIENT
Start: 2024-03-21 | End: 2024-04-16

## 2024-03-20 RX ORDER — ACETAMINOPHEN 500 MG
1000 TABLET ORAL
Status: COMPLETED | OUTPATIENT
Start: 2024-03-20 | End: 2024-03-20

## 2024-03-20 RX ORDER — IBUPROFEN 200 MG
24 TABLET ORAL
Status: DISCONTINUED | OUTPATIENT
Start: 2024-03-20 | End: 2024-04-19 | Stop reason: HOSPADM

## 2024-03-20 RX ORDER — TALC
6 POWDER (GRAM) TOPICAL NIGHTLY PRN
Status: DISCONTINUED | OUTPATIENT
Start: 2024-03-20 | End: 2024-04-19 | Stop reason: HOSPADM

## 2024-03-20 RX ORDER — INSULIN ASPART 100 [IU]/ML
0-5 INJECTION, SOLUTION INTRAVENOUS; SUBCUTANEOUS
Status: DISCONTINUED | OUTPATIENT
Start: 2024-03-20 | End: 2024-04-19 | Stop reason: HOSPADM

## 2024-03-20 RX ORDER — NALOXONE HCL 0.4 MG/ML
0.02 VIAL (ML) INJECTION
Status: DISCONTINUED | OUTPATIENT
Start: 2024-03-20 | End: 2024-04-19 | Stop reason: HOSPADM

## 2024-03-20 RX ORDER — TAMSULOSIN HYDROCHLORIDE 0.4 MG/1
2 CAPSULE ORAL DAILY
Status: DISCONTINUED | OUTPATIENT
Start: 2024-03-21 | End: 2024-04-19 | Stop reason: HOSPADM

## 2024-03-20 RX ORDER — SODIUM CHLORIDE 0.9 % (FLUSH) 0.9 %
10 SYRINGE (ML) INJECTION EVERY 8 HOURS
Status: DISCONTINUED | OUTPATIENT
Start: 2024-03-20 | End: 2024-03-30

## 2024-03-20 RX ORDER — DILTIAZEM HCL 1 MG/ML
0-15 INJECTION, SOLUTION INTRAVENOUS CONTINUOUS
Status: DISCONTINUED | OUTPATIENT
Start: 2024-03-20 | End: 2024-03-20

## 2024-03-20 RX ORDER — LIDOCAINE HYDROCHLORIDE 10 MG/ML
1 INJECTION INFILTRATION; PERINEURAL ONCE
Status: COMPLETED | OUTPATIENT
Start: 2024-03-20 | End: 2024-03-20

## 2024-03-20 RX ORDER — DILTIAZEM HYDROCHLORIDE 5 MG/ML
15 INJECTION INTRAVENOUS
Status: COMPLETED | OUTPATIENT
Start: 2024-03-20 | End: 2024-03-20

## 2024-03-20 RX ADMIN — Medication 10 ML: at 09:03

## 2024-03-20 RX ADMIN — APIXABAN 5 MG: 5 TABLET, FILM COATED ORAL at 09:03

## 2024-03-20 RX ADMIN — SEVELAMER CARBONATE 800 MG: 800 TABLET, FILM COATED ORAL at 06:03

## 2024-03-20 RX ADMIN — AMIODARONE HYDROCHLORIDE 1 MG/MIN: 1.8 INJECTION, SOLUTION INTRAVENOUS at 06:03

## 2024-03-20 RX ADMIN — DOCUSATE SODIUM AND SENNOSIDES 1 TABLET: 8.6; 5 TABLET ORAL at 09:03

## 2024-03-20 RX ADMIN — SODIUM CHLORIDE 500 ML: 9 INJECTION, SOLUTION INTRAVENOUS at 03:03

## 2024-03-20 RX ADMIN — CEFEPIME 2 G: 2 INJECTION, POWDER, FOR SOLUTION INTRAVENOUS at 03:03

## 2024-03-20 RX ADMIN — CEFTRIAXONE 2 G: 2 INJECTION, POWDER, FOR SOLUTION INTRAMUSCULAR; INTRAVENOUS at 07:03

## 2024-03-20 RX ADMIN — DILTIAZEM HYDROCHLORIDE 20 MG: 5 INJECTION INTRAVENOUS at 01:03

## 2024-03-20 RX ADMIN — AMIODARONE HYDROCHLORIDE 150 MG: 1.5 INJECTION, SOLUTION INTRAVENOUS at 05:03

## 2024-03-20 RX ADMIN — AMIODARONE HYDROCHLORIDE 0.5 MG/MIN: 1.8 INJECTION, SOLUTION INTRAVENOUS at 11:03

## 2024-03-20 RX ADMIN — ACETAMINOPHEN 1000 MG: 500 TABLET ORAL at 02:03

## 2024-03-20 RX ADMIN — DEXTROSE MONOHYDRATE 1250 MG: 50 INJECTION, SOLUTION INTRAVENOUS at 04:03

## 2024-03-20 RX ADMIN — SODIUM ZIRCONIUM CYCLOSILICATE 10 G: 10 POWDER, FOR SUSPENSION ORAL at 06:03

## 2024-03-20 RX ADMIN — LIDOCAINE HYDROCHLORIDE 1 ML: 10 INJECTION, SOLUTION INFILTRATION; PERINEURAL at 07:03

## 2024-03-20 RX ADMIN — DILTIAZEM HYDROCHLORIDE 15 MG: 5 INJECTION INTRAVENOUS at 02:03

## 2024-03-20 NOTE — ASSESSMENT & PLAN NOTE
R ankle pain, swelling, warmth present. Potential gout vs cellulitis vs fracture   - arterial US and venous US with no clots, appropriate flow  - check R ankle XR  - given vanc/cefepime for potential cellulitis. Will continue with ceftriaxone for strep coverage. Blood cultures in process  - check uric acid. Consider colchicine

## 2024-03-20 NOTE — ASSESSMENT & PLAN NOTE
This patient has hyperkalemia which is uncontrolled. We will monitor for arrhythmias with EKG or continuous telemetry. We will treat the hyperkalemia with Potassium Binders and dialysis . The likely etiology of the hyperkalemia is ESRD with missed dialysis session.  The patients latest potassium has been reviewed and the results are listed below  Recent Labs   Lab 03/20/24  1313   K 6.0*   - Nephrology consulted for HD  - sodium zirc x1 now

## 2024-03-20 NOTE — ADMISSIONCARE
AdmissionCare    Guideline: Atrial Fibrillation - INPT, Inpatient    Based on the indications selected for the patient, the bed status of Inpatient was determined to be MET    The following indications were selected as present at the time of evaluation of the patient:      - Initiation or adjustment of antiarrhythmic medication that requires cardiac monitoring (eg, telemetry), as indicated by ALL of the following:    - Cardiac monitoring (eg, telemetry) appropriate, as indicated by 1 or more of the following:     - Need for treatment with antiarrhythmic medication regimen that has significant proarrhythmic potential (eg, monomorphic VT, torsades de pointes (TdP), bradycardia)    - Cardiac monitoring required that extends beyond observation care time frame    AdmissionCare documentation entered by: Andres Peacock    [facility], 27th edition, Copyright © 2023 [facility], [facility] All Rights Reserved.  [id number]

## 2024-03-20 NOTE — NURSING
Patient arrived to ICU via stretcher AAO X3, A-Fib -140s Amio started as ordered, 12 Lead EKG done and result notified to ,  Patient came to ICU with one PIV, pt can't have PICC/Midline per per nephrology, and Dr. Davenport ordered ASAP HD, notified on call HD nurse All RN, Dr. Abraham consult  anesthesiologist for EJ/TLC, anesthesiologist at bedside.

## 2024-03-20 NOTE — CONSULTS
[facility] on Call  I got a call from Nurse Vazquez regarding questions for mid-line and noticed that the Consult for Dr. Currie was never called, Pte. Is a 69 y/o male with ESRD on HD (known to me) being admitted with malaise and R-LE discoloration; Pte's last dialysis was Monday and his K+ now ir 6.  Spoke with Taylor and instructed her to contact Dialysis Nurse on call and proceed with HD treatment tonight.  Regarding Mid-line since this is a Dialysis Pte. No Mid or PICC lines allowed, TLC is o.k.  Dr. Currie will f/u Pte. In a.m.

## 2024-03-20 NOTE — H&P
[facility] Emergency [facility]  [facility] Medicine  History & Physical    Patient Name: Miguel Moore  MRN: [id number]  Patient Class: IP- Inpatient  Admission Date: 3/20/2024  Attending Physician: Patricia Reddy MD   Primary Care Provider: Raciel Raymond MD         Patient information was obtained from patient, past medical records, and ER records.     Subjective:     Principal Problem:Paroxysmal atrial fibrillation with RVR    Chief Complaint:   Chief Complaint   Patient presents with    Fatigue     Pt BIB EMS from home for malaise x3 days and discoloration to right lower leg. Pt stated he missed his dialysis today.        HPI: Mr Miguel Moore is a 68 y.o. man with ESRD on HD, HTN, DM with neuropathy, history of CVA with residual R sided weakness, chronic DVT on eliquis who presented with feeling poorly. He attended his usual dialysis session on Monday 3/18 without issues. He developed fatigue and R ankle swelling. He has some pain with palpation but is able to walk. No reports of trauma. No wounds. No falls. No history of gout. Today he was feeling worse so did not go to dialysis and came to the ED. No fevers. No shortness of breath. Normal appetite. No nausea, vomiting. No chest pain. No palpitations.     In the ED, afebrile with HR initially controlled then to 130s Afib RVR. Started diltiazem but became hypotensive. Diltiazem stopped. Given antibiotics. Admitted for further management.     Past Medical History:   Diagnosis Date    Allergy     Clotting disorder     Elaquis and APlavix    Deep vein thrombosis     Diabetes mellitus, type 2     Hypertension     Nuclear sclerosis of both eyes 6/9/2017    Renal disorder     Seizures     Stroke     Urinary tract infection        Past Surgical History:   Procedure Laterality Date    CATARACT EXTRACTION W/  INTRAOCULAR LENS IMPLANT Right 10/05/2017    Dr. Tay    CATARACT EXTRACTION W/  INTRAOCULAR LENS IMPLANT Left 10/19/2017    Dr. Tay    CYSTOSCOPY  W/ RETROGRADES Bilateral 2/18/2021    Procedure: CYSTOSCOPY, WITH RETROGRADE PYELOGRAM;  Surgeon: JEMMA Styles MD;  Location: [facility] OR;  Service: Urology;  Laterality: Bilateral;  REQUESTING EARLY AS POSSIBE-LO / 2/8/2021 @ 1:13PM  RN Pre Op 2-11-21, Covid NEGATIVE ON  2-17-21.  C A    ESOPHAGOGASTRODUODENOSCOPY N/A 8/17/2020    Procedure: EGD (ESOPHAGOGASTRODUODENOSCOPY);  Surgeon: Desmond Chapman MD;  Location: [facility] ENDO;  Service: Endoscopy;  Laterality: N/A;    EYE SURGERY Bilateral     cataract    FEMORAL ARTERY STENT      FRACTURE SURGERY      KNEE SURGERY      bilateral scope       Review of patient's allergies indicates:   Allergen Reactions    Ace inhibitors      Hyperkalemia 8/2018  Other reaction(s): Unknown    Penicillins Hives    Tizanidine      Felt hot       No current facility-administered medications on file prior to encounter.     Current Outpatient Medications on File Prior to Encounter   Medication Sig    amLODIPine (NORVASC) 10 MG tablet Take 1 tablet by mouth once daily    apixaban (ELIQUIS) 5 mg Tab Take 1 tablet (5 mg total) by mouth 2 (two) times daily.    aspirin (ECOTRIN) 81 MG EC tablet Take 1 tablet (81 mg total) by mouth once daily.    atorvastatin (LIPITOR) 80 MG tablet Take 1 tablet (80 mg total) by mouth once daily.    finasteride (PROSCAR) 5 mg tablet Take 1 tablet (5 mg total) by mouth once daily.    iron sucrose in NS (VENOFER) 100 mg/100 mL PgBk 50 mg.    labetaloL (NORMODYNE) 100 MG tablet Take 1 tablet (100 mg total) by mouth once daily.    methoxy peg-epoetin beta (MIRCERA INJ) 75 mcg.    mupirocin (BACTROBAN) 2 % ointment Apply to affected area 3 times daily    RENVELA 800 mg Tab Take 1 tablet (800 mg total) by mouth 3 (three) times daily with meals.    sildenafiL (VIAGRA) 100 MG tablet Take 1 tablet (100 mg total) by mouth daily as needed.    tamsulosin (FLOMAX) 0.4 mg Cap Take 2 capsules (0.8 mg total) by mouth once daily.    [DISCONTINUED] blood sugar diagnostic Strp  To check BG 1 times daily, to use with insurance preferred meter    [DISCONTINUED] blood-glucose meter kit To check BG 1 times daily, to use with insurance preferred meter    [DISCONTINUED] cholecalciferol, vitamin D3, (VITAMIN D3) 25 mcg (1,000 unit) capsule Take 1 capsule (1,000 Units total) by mouth once daily.    [DISCONTINUED] clotrimazole-betamethasone 1-0.05% (LOTRISONE) cream Apply topically 2 (two) times daily.    [DISCONTINUED] hydrocortisone (WESTCORT) 0.2 % cream Apply topically 2 (two) times daily.    [DISCONTINUED] lancets Misc To check BG 1 times daily, to use with insurance preferred meter    [DISCONTINUED] levETIRAcetam (KEPPRA) 250 MG Tab Take 1 tablet (250 mg total) by mouth 2 (two) times daily on Tuesday, Thursday, Saturday & Sunday. Take 2 tablets (500mg total) by mouth in the morning and 1 tablet (250mg total) in the evening on dialysis days (Monday, Wednesday, & Friday)     Family History       Problem Relation (Age of Onset)    Cataracts Mother    Diabetes Mother    Heart disease Mother    Hypertension Mother, Sister    No Known Problems Father, Brother, Maternal Aunt, Maternal Uncle, Paternal Aunt, Paternal Uncle, Maternal Grandmother, Maternal Grandfather, Paternal Grandmother, Paternal Grandfather, Daughter, Other          Tobacco Use    Smoking status: Never    Smokeless tobacco: Never   Substance and Sexual Activity    Alcohol use: No    Drug use: No    Sexual activity: Not on file     Review of Systems   Constitutional:  Positive for activity change and fatigue. Negative for appetite change and fever.   Respiratory:  Negative for cough, chest tightness, shortness of breath and wheezing.    Cardiovascular:  Negative for chest pain, palpitations and leg swelling.   Gastrointestinal:  Negative for abdominal distention, abdominal pain, constipation, diarrhea, nausea and vomiting.   Genitourinary:  Positive for decreased urine volume.   Musculoskeletal:  Positive for arthralgias.   Skin:   Positive for color change. Negative for wound.   Neurological:  Positive for weakness.   Psychiatric/Behavioral:  Negative for confusion.      Objective:     Vital Signs (Most Recent):  Temp: 99.2 °F (37.3 °C) (03/20/24 1010)  Pulse: (!) 115 (03/20/24 1617)  Resp: (!) 24 (03/20/24 1617)  BP: (!) 89/56 (03/20/24 1617)  SpO2: 96 % (03/20/24 1617) Vital Signs (24h Range):  Temp:  [99.2 °F (37.3 °C)] 99.2 °F (37.3 °C)  Pulse:  [] 115  Resp:  [13-25] 24  SpO2:  [94 %-98 %] 96 %  BP: ()/(31-75) 89/56     Weight: 79.4 kg (175 lb)  Body mass index is 26.61 kg/m².     Physical Exam  Vitals and nursing note reviewed.   Constitutional:       General: He is not in acute distress.     Appearance: He is ill-appearing. He is not toxic-appearing.   HENT:      Head: Normocephalic and atraumatic.      Nose: Nose normal.      Mouth/Throat:      Mouth: Mucous membranes are moist.   Cardiovascular:      Rate and Rhythm: Tachycardia present. Rhythm irregular.      Pulses: Normal pulses.      Comments: AFib  Pulmonary:      Effort: Pulmonary effort is normal. No respiratory distress.      Breath sounds: Normal breath sounds. No wheezing, rhonchi or rales.      Comments: Room air  Abdominal:      General: Bowel sounds are normal. There is no distension.      Palpations: Abdomen is soft. There is no mass.      Tenderness: There is no abdominal tenderness. There is no guarding.   Musculoskeletal:         General: Swelling and tenderness (with R ankle palpation) present.      Right lower leg: Edema present.      Comments: R ankle swelling compared the left   Skin:     Comments: Hyperpigmentation and warmth to R ankle. LUE AVF with thrill   Neurological:      Mental Status: He is alert and oriented to person, place, and time. Mental status is at baseline.      Comments: R arm held in flexion                Significant Labs: All pertinent labs within the past 24 hours have been reviewed.    Significant Imaging: I have reviewed all  pertinent imaging results/findings within the past 24 hours.  Assessment/Plan:     * Paroxysmal atrial fibrillation with RVR  Patient has Paroxysmal (<7 days) atrial fibrillation which is uncontrolled. Patient is currently in atrial fibrillation.  - new onset Afib  - on labetalol for BP at home, eliquis for chronic DVT but did miss some doses of eliquis  - in ED given diltiazem gtt but that caused hypotension  - EKG with Afib RVR with normal Qtc  - plan start amio  - continue home eliquis  - Cardiology consulted  - check TSH, check TTE  - NPO at MD in case needs cardioversion      [id number] - VASc score:  Congestive Heart Failure or EF < 35% NO   Hypertension YES  +1   Age >= 75 NO   Diabetes Mellitus YES  +1   Stroke/TIA prior history YES  +2   Vascular disease (PAD, MI or Aortic Plaque)? YES  +1   Age 65 - 74 YES  +1   Female NO   Total 6         Right ankle pain  R ankle pain, swelling, warmth present. Potential gout vs cellulitis vs fracture   - arterial US and venous US with no clots, appropriate flow  - check R ankle XR  - given vanc/cefepime for potential cellulitis. Will continue with ceftriaxone for strep coverage. Blood cultures in process  - check uric acid. Consider colchicine       Anemia in chronic kidney disease  Patient's anemia is currently controlled. Has not received any PRBCs to date. Etiology likely d/t chronic disease due to ESRD  Current CBC reviewed-   Lab Results   Component Value Date    HGB 12.5 (L) 03/20/2024    HCT 36.0 (L) 03/20/2024     Monitor serial CBC and transfuse if patient becomes hemodynamically unstable, symptomatic or H/H drops below 7/21.    Hyperkalemia  This patient has hyperkalemia which is uncontrolled. We will monitor for arrhythmias with EKG or continuous telemetry. We will treat the hyperkalemia with Potassium Binders and dialysis . The likely etiology of the hyperkalemia is ESRD with missed dialysis session.  The patients latest potassium has been reviewed and the  results are listed below  Recent Labs   Lab 03/20/24  1313   K 6.0*   - Nephrology consulted for HD  - sodium zirc x1 now           Secondary hyperparathyroidism of renal origin  Continue renvela       ESRD (end stage renal disease)  ESRD via LUE AVF. Last session 3/18/24. Missed 3/20/24 due to fatigue. Usually MWF  - hyperkalemic on admit. Does not appear volume overloaded.   - Nephrology Dr Currie consulted. Needs HD  - sodium zirc x1 for now while awaiting HD    Hemiplegia and hemiparesis following cerebral infarction affecting right dominant side  No signs of acute stroke. Does have RUE weakness.   - continue home asa, eliquis, statin      Seizure disorder as sequela of cerebrovascular accident  Not currently on antiepileptics. No seizure activity reported. Monitor.       Controlled type 2 diabetes mellitus with chronic kidney disease on chronic dialysis, with long-term current use of insulin  A1c:   Lab Results   Component Value Date    HGBA1C 5.9 (H) 10/26/2023     Meds: SSI PRN to maintain goal 140-180  ADA renal diet, accuchecks, hypoglycemic protocol      Dyslipidemia  Continue statin      Benign essential HTN  Chronic,  currently hypotensive . Latest blood pressure and vitals reviewed-     Temp:  [99.2 °F (37.3 °C)]   Pulse:  []   Resp:  [13-25]   BP: ()/(31-75)   SpO2:  [94 %-98 %] .   Home meds for hypertension were reviewed and noted below.   Hypertension Medications               amLODIPine (NORVASC) 10 MG tablet Take 1 tablet by mouth once daily    labetaloL (NORMODYNE) 100 MG tablet Take 1 tablet (100 mg total) by mouth once daily.            While in the hospital, will manage blood pressure as follows; Adjust home antihypertensive regimen as follows- hold home Rx due to hypotension    Will utilize p.r.n. blood pressure medication only if patient's blood pressure greater than 180/110 and he develops symptoms such as worsening chest pain or shortness of breath.      VTE Risk Mitigation  (From admission, onward)           Ordered     apixaban tablet 5 mg  2 times daily         03/20/24 7958                       Critical care time spent on the evaluation and treatment of severe organ dysfunction, review of pertinent labs and imaging studies, discussions with consulting providers and discussions with patient/family: 60 minutes           AdmissionCare    Guideline: Atrial Fibrillation - INPT, Inpatient    Based on the indications selected for the patient, the bed status of Inpatient was determined to be MET    The following indications were selected as present at the time of evaluation of the patient:      - Initiation or adjustment of antiarrhythmic medication that requires cardiac monitoring (eg, telemetry), as indicated by ALL of the following:    - Cardiac monitoring (eg, telemetry) appropriate, as indicated by 1 or more of the following:     - Need for treatment with antiarrhythmic medication regimen that has significant proarrhythmic potential (eg, monomorphic VT, torsades de pointes (TdP), bradycardia)    - Cardiac monitoring required that extends beyond observation care time frame    AdmissionCare documentation entered by: Andres Peacock    [facility] [place] [facility] [email or web address], 27th edition, Copyright © 2023 [facility] [place] [email or web address], [facility] All Rights Reserved.  [id number]    Patricia Reddy MD  Department of Hospital Medicine  [facility] - Emergency Dept

## 2024-03-20 NOTE — ED PROVIDER NOTES
Encounter Date: 3/20/2024    SCRIBE #1 NOTE: I, Christiana Abreu, am scribing for, and in the presence of,  Emilio Jaimes MD. I have scribed the following portions of the note - Other sections scribed: HPI, ROS.       History     Chief Complaint   Patient presents with    Fatigue     Pt BIB EMS from home for malaise x3 days and discoloration to right lower leg. Pt stated he missed his dialysis today.     This 68 y.o male, with a medical history of Clotting disorder, Deep vein thrombosis, Diabetes mellitus type 2, Hypertension, Renal disorder, Seizures, Stroke, and Urinary tract infection, presents to the ED c/o acute, constant pain, swelling and weakness to the right lower leg and foot for the last couple of days. Pt reports that he is a dialysis patient (MWF) and was scheduled to receive dialysis today, but missed his treatment due to the symptoms. He states that he is normally able to ambulate on the leg and has not had any issues with it in the past. He is currently on Eliquis for a history of DVT's (unsure which leg). No history of stent placement or cardiac issues. Pt notes his nephrologist as Dr. Menard. He denies hip pain or any other associated symptoms.    The history is provided by the patient.     Review of patient's allergies indicates:   Allergen Reactions    Ace inhibitors      Hyperkalemia 8/2018  Other reaction(s): Unknown    Penicillins Hives    Tizanidine      Felt hot     Past Medical History:   Diagnosis Date    Allergy     Clotting disorder     Elaquis and APlavix    Deep vein thrombosis     Diabetes mellitus, type 2     Hypertension     Nuclear sclerosis of both eyes 6/9/2017    Renal disorder     Seizures     Stroke     Urinary tract infection      Past Surgical History:   Procedure Laterality Date    CATARACT EXTRACTION W/  INTRAOCULAR LENS IMPLANT Right 10/05/2017    Dr. Tay    CATARACT EXTRACTION W/  INTRAOCULAR LENS IMPLANT Left 10/19/2017    Dr. Tay    CYSTOSCOPY W/  RETROGRADES Bilateral 2/18/2021    Procedure: CYSTOSCOPY, WITH RETROGRADE PYELOGRAM;  Surgeon: JEMMA Styles MD;  Location: [facility] OR;  Service: Urology;  Laterality: Bilateral;  REQUESTING EARLY AS POSSIBE-LO / 2/8/2021 @ 1:13PM  RN Pre Op 2-11-21, Covid NEGATIVE ON  2-17-21.  C A    ESOPHAGOGASTRODUODENOSCOPY N/A 8/17/2020    Procedure: EGD (ESOPHAGOGASTRODUODENOSCOPY);  Surgeon: Desmond Chapman MD;  Location: [facility] ENDO;  Service: Endoscopy;  Laterality: N/A;    EYE SURGERY Bilateral     cataract    FEMORAL ARTERY STENT      FRACTURE SURGERY      KNEE SURGERY      bilateral scope     Family History   Problem Relation Age of Onset    Diabetes Mother     Heart disease Mother     Hypertension Mother     Cataracts Mother     No Known Problems Father     Hypertension Sister     No Known Problems Brother     No Known Problems Maternal Aunt     No Known Problems Maternal Uncle     No Known Problems Paternal Aunt     No Known Problems Paternal Uncle     No Known Problems Maternal Grandmother     No Known Problems Maternal Grandfather     No Known Problems Paternal Grandmother     No Known Problems Paternal Grandfather     No Known Problems Daughter     No Known Problems Other     Amblyopia Neg Hx     Blindness Neg Hx     Cancer Neg Hx     Glaucoma Neg Hx     Macular degeneration Neg Hx     Retinal detachment Neg Hx     Strabismus Neg Hx     Stroke Neg Hx     Thyroid disease Neg Hx      Social History     Tobacco Use    Smoking status: Never    Smokeless tobacco: Never   Substance Use Topics    Alcohol use: No    Drug use: No     Review of Systems   Constitutional: Negative.    HENT: Negative.     Eyes: Negative.    Respiratory: Negative.     Cardiovascular:  Positive for leg swelling (to the right lower leg and foot).   Gastrointestinal: Negative.    Genitourinary: Negative.    Musculoskeletal:         (+) pain to the right lower leg and foot   Skin: Negative.    Neurological:  Positive for weakness (to the right  lower leg and foot).       Physical Exam     Initial Vitals [03/20/24 1010]   BP Pulse Resp Temp SpO2   120/70 79 18 99.2 °F (37.3 °C) 98 %      MAP       --         Physical Exam    Nursing note and vitals reviewed.  Constitutional: He is not diaphoretic. He appears distressed (Chronically ill-appearing).   HENT:   Head: Normocephalic and atraumatic.   Nose: Nose normal.   Eyes: EOM are normal. Pupils are equal, round, and reactive to light.   Neck: Neck supple. No tracheal deviation present. No JVD present.   Normal range of motion.  Cardiovascular:  Normal heart sounds and intact distal pulses.           Irregularly irregular rate and rhythm   Pulmonary/Chest: Breath sounds normal. No respiratory distress. He has no wheezes. He has no rhonchi. He has no rales.   Abdominal: Abdomen is soft. Bowel sounds are normal. He exhibits no distension. There is no abdominal tenderness. There is no rebound and no guarding.   Musculoskeletal:         General: Tenderness (notable discoloration of the right lower extremity, trace peripheral edema of the right ankle, +DP pulses small excoriation small wound noted to the anterior portion of distal tibia some mild warmth around this area, no discharge drainage noted.) and edema present.      Cervical back: Normal range of motion and neck supple.     Neurological: He is alert and oriented to person, place, and time. He has normal strength.   Skin: Skin is warm and dry. Capillary refill takes less than 2 seconds. No rash noted. No erythema.         ED Course   Critical Care    Date/Time: 3/20/2024 12:15 PM    Performed by: Emilio Jaimes MD  Authorized by: Emilio Jaimes MD  Direct patient critical care time: 15 minutes  Additional history critical care time: 10 minutes  Ordering / reviewing critical care time: 5 minutes  Documentation critical care time: 5 minutes  Total critical care time (exclusive of procedural time) : 35 minutes  Critical care time was exclusive of  separately billable procedures and treating other patients and teaching time.  Critical care was necessary to treat or prevent imminent or life-threatening deterioration of the following conditions: shock and circulatory failure.  Critical care was time spent personally by me on the following activities: development of treatment plan with patient or surrogate, evaluation of patient's response to treatment, examination of patient, obtaining history from patient or surrogate, ordering and performing treatments and interventions, ordering and review of laboratory studies, ordering and review of radiographic studies, re-evaluation of patient's condition, review of old charts and pulse oximetry.        Labs Reviewed   CBC W/ AUTO DIFFERENTIAL - Abnormal; Notable for the following components:       Result Value    Hemoglobin 12.5 (*)     Hematocrit 36.0 (*)     MCV 78 (*)     Platelets 74 (*)     Lymph % 10.0 (*)     All other components within normal limits   COMPREHENSIVE METABOLIC PANEL - Abnormal; Notable for the following components:    Sodium 133 (*)     Potassium 6.0 (*)     Chloride 93 (*)     BUN 65 (*)     Creatinine 13.8 (*)     Albumin 2.7 (*)     eGFR 3 (*)     All other components within normal limits   TROPONIN I - Abnormal; Notable for the following components:    Troponin I 0.108 (*)     All other components within normal limits   B-TYPE NATRIURETIC PEPTIDE - Abnormal; Notable for the following components:     (*)     All other components within normal limits   LACTIC ACID, PLASMA   HEMOGLOBIN A1C   POCT GLUCOSE MONITORING CONTINUOUS          Imaging Results              X-Ray Ankle 2 View Right (Final result)  Result time 03/20/24 18:43:13      Final result by Juan David Barillas MD (03/20/24 18:43:13)                   Impression:      Advanced degenerative changes seen throughout the ankle and hindfoot.  No definite acute osseous abnormality identified.      Electronically signed by: Juan David  MD Nargis  Date:    03/20/2024  Time:    18:43               Narrative:    EXAMINATION:  XR ANKLE 2 VIEW RIGHT    CLINICAL HISTORY:  R ankle pain;    TECHNIQUE:  Right ankle AP and lateral views.    COMPARISON:  None    FINDINGS:  No evidence of acute displaced fracture or dislocation.  No obvious acute osseous destructive process seen.  Advanced degenerative changes are seen throughout the ankle and hindfoot.  No abnormal widening of the ankle mortise.  Fairly extensive vascular calcifications are noted.  Soft tissue swelling is seen involving the lower leg and ankle region, more pronounced laterally.                                       US Lower Extremity Arteries Right (Final result)  Result time 03/20/24 11:51:34      Final result by Kareem Luu MD (03/20/24 11:51:34)                   Impression:      Arterial vasculature of the right lower extremity is patent.  However, there is evidence of atherosclerotic change with stenosis at the level of popliteal artery.      Electronically signed by: Kareem Luu MD  Date:    03/20/2024  Time:    11:51               Narrative:    EXAMINATION:  US LOWER EXTREMITY ARTERIES RIGHT    CLINICAL HISTORY:  Pain in leg, unspecified    TECHNIQUE:  Bilateral spectral, color and grayscale images of the large arteries of the right lower extremity were performed.    COMPARISON:  None    FINDINGS:  Right lower extremity.    CFA: 95 cm/s, triphasic    DFA: 77 cm/s, triphasic    Prox SFA: 76 cm/s, tri phasic    Mid SFA: 75 cm/s, biphasic    Dist SFA: 49 cm/s, biphasic    Pop A: 104-171 cm/s, biphasic    Per A: 110 cm/s, biphasic    PTA: 22 cm/s, monophasic    SAHRA: 56 cm/s, monophasic    DPA: 59 cm/s, monophasic                                       US Lower Extremity Veins Right (Final result)  Result time 03/20/24 11:42:45      Final result by Kareem Luu MD (03/20/24 11:42:45)                   Impression:      No evidence of deep venous thrombosis in the right lower  extremity.      Electronically signed by: Kareem Luu MD  Date:    03/20/2024  Time:    11:42               Narrative:    EXAMINATION:  US LOWER EXTREMITY VEINS RIGHT    CLINICAL HISTORY:  Other specified soft tissue disorders    TECHNIQUE:  Duplex and color flow Doppler evaluation and graded compression of the right lower extremity veins was performed.    COMPARISON:  08/13/2020    FINDINGS:  Right thigh veins: The common femoral, femoral, popliteal, upper greater saphenous, and deep femoral veins are patent and free of thrombus. The veins are normally compressible and have normal phasic flow and augmentation response.    Right calf veins: The visualized calf veins are patent.    Contralateral CFV: The contralateral (left) common femoral vein is patent and free of thrombus.    Miscellaneous: None                                       Medications   apixaban tablet 5 mg (5 mg Oral Given 3/21/24 0826)   atorvastatin tablet 80 mg (80 mg Oral Given 3/21/24 0827)   finasteride tablet 5 mg (5 mg Oral Given 3/21/24 0826)   sevelamer carbonate tablet 800 mg (800 mg Oral Given 3/21/24 1110)   tamsulosin 24 hr capsule 0.8 mg (0.8 mg Oral Given 3/21/24 0827)   glucose chewable tablet 16 g (has no administration in time range)   glucose chewable tablet 24 g (has no administration in time range)   glucagon (human recombinant) injection 1 mg (has no administration in time range)   insulin aspart U-100 pen 0-5 Units (has no administration in time range)   dextrose 10% bolus 125 mL 125 mL (has no administration in time range)   dextrose 10% bolus 250 mL 250 mL (has no administration in time range)   amiodarone 360 mg/200 mL (1.8 mg/mL) infusion (0 mg/min Intravenous Stopped 3/20/24 2327)   cefTRIAXone (ROCEPHIN) 2 g in dextrose 5 % in water (D5W) 100 mL IVPB (MB+) (0 g Intravenous Stopped 3/20/24 2003)   sodium chloride 0.9% flush 10 mL (10 mLs Intravenous Given 3/21/24 0623)   melatonin tablet 6 mg (has no administration in  time range)   ondansetron injection 4 mg (has no administration in time range)   prochlorperazine injection Soln 5 mg (has no administration in time range)   senna-docusate 8.6-50 mg per tablet 1 tablet (1 tablet Oral Not Given 3/21/24 0900)   acetaminophen tablet 650 mg (has no administration in time range)   naloxone 0.4 mg/mL injection 0.02 mg (has no administration in time range)   sodium chloride 0.9% bolus 250 mL 250 mL (has no administration in time range)   amiodarone tablet 200 mg (200 mg Oral Given 3/21/24 1013)   mupirocin 2 % ointment ( Nasal Given 3/21/24 1013)   colchicine capsule/tablet 0.6 mg (has no administration in time range)   diltiaZEM injection 20 mg (20 mg Intravenous Given 3/20/24 1313)   acetaminophen tablet 1,000 mg (1,000 mg Oral Given 3/20/24 1411)   diltiaZEM injection 15 mg (15 mg Intravenous Given 3/20/24 1420)   vancomycin 1,250 mg in dextrose 5 % (D5W) 250 mL IVPB (Vial-Mate) (0 mg Intravenous Stopped 3/20/24 1744)   ceFEPIme (MAXIPIME) 2 g in dextrose 5 % in water (D5W) 100 mL IVPB (MB+) (0 g Intravenous Stopped 3/20/24 1606)   sodium chloride 0.9% bolus 500 mL 500 mL (0 mLs Intravenous Stopped 3/20/24 1613)   sodium zirconium cyclosilicate packet 10 g (10 g Oral Given 3/20/24 1809)   amiodarone in dextrose 150 mg/100 mL (1.5 mg/mL) loading dose 150 mg (0 mg Intravenous Stopped 3/20/24 1809)   LIDOcaine HCL 10 mg/ml (1%) injection 1 mL (1 mL Other Given 3/20/24 1921)     Medical Decision Making  Amount and/or Complexity of Data Reviewed  Labs: ordered.    Risk  OTC drugs.  Prescription drug management.  Decision regarding hospitalization.      MDM:    68-year-old male with past medical history as noted above presenting with fatigue and right leg pain.  Differential Diagnosis includes:  Cellulitis, abscess, infection, sepsis, need for emergent dialysis, arterial insufficiency, DVT.  Physical exam as noted above.  ED workup notable for TSH 2.67, uric acid 5.0, blood cultures pending,  lactate 1.9, CBC white blood cell count 8.12, hemoglobin 12.5, toxic granulocytes present, 11.0 bands, CMP sodium 133, BUN 65, creatinine 13.8, potassium 6.0, troponin 0.108, BNP 3 3 0, x-ray of the right ankle shows some advanced degenerative changes, otherwise no acute findings noted, ultrasound of the right lower extremity shows no evidence of DVT, some atherosclerotic changes stenosis of the level of popliteal artery is present.  Patient presentation appears consistent with possible cellulitis, significant elevation compared to his baseline leukopenia here today with some noted bands, blood cultures drawn, antibiotics given for possible cellulitis.  He does have some mild hyperkalemia, discussed with nephrology will give Gayma anticipate dialysis likely tomorrow as he does not appear to be overly volume overloaded.  He does appear to be in AFib with RVR, some mention of this previously in his chart history, he is currently anticoagulated given Dilt push x2 and ultimately placed on a diltiazem infusion.  Will transition to ICU for further care and management.  Communicated this to family bedside.      Note was created using voice recognition software. Note may have occasional typographical or grammatical errors, garbled syntax, and other bizarre constructions that may not have been identified and edited despite good juliano initial review prior to signing.             Scribe Attestation:   Scribe #1: I performed the above scribed service and the documentation accurately describes the services I performed. I attest to the accuracy of the note.                         I, Emilio Jaimes M.D., personally performed the services described in this documentation. All medical record entries made by the scribe were at my direction and in my presence. I have reviewed the chart and agree that the record reflects my personal performance and is accurate and complete.       Clinical Impression:  Final diagnoses:  [M79.606]  Leg pain  [M79.89] Leg swelling          ED Disposition Condition    Admit                 Emilio Jaimes MD  03/21/24 1111

## 2024-03-20 NOTE — ASSESSMENT & PLAN NOTE
Patient's anemia is currently controlled. Has not received any PRBCs to date. Etiology likely d/t chronic disease due to ESRD  Current CBC reviewed-   Lab Results   Component Value Date    HGB 12.5 (L) 03/20/2024    HCT 36.0 (L) 03/20/2024     Monitor serial CBC and transfuse if patient becomes hemodynamically unstable, symptomatic or H/H drops below 7/21.

## 2024-03-20 NOTE — HPI
Mr Miguel Moore is a 68 y.o. man with ESRD on HD, HTN, DM with neuropathy, history of CVA with residual R sided weakness, chronic DVT on eliquis who presented with feeling poorly. He attended his usual dialysis session on Monday 3/18 without issues. He developed fatigue and R ankle swelling. He has some pain with palpation but is able to walk. No reports of trauma. No wounds. No falls. No history of gout. Today he was feeling worse so did not go to dialysis and came to the ED. No fevers. No shortness of breath. Normal appetite. No nausea, vomiting. No chest pain. No palpitations.     In the ED, afebrile with HR initially controlled then to 130s Afib RVR. Started diltiazem but became hypotensive. Diltiazem stopped. Given antibiotics. Admitted for further management.

## 2024-03-20 NOTE — ASSESSMENT & PLAN NOTE
ESRD via [facility]. Last session 3/18/24. Missed 3/20/24 due to fatigue. Usually MWF  - hyperkalemic on admit. Does not appear volume overloaded.   - Nephrology Dr Currie consulted. Needs HD  - sodium zirc x1 for now while awaiting HD

## 2024-03-20 NOTE — ASSESSMENT & PLAN NOTE
Chronic,  currently hypotensive . Latest blood pressure and vitals reviewed-     Temp:  [99.2 °F (37.3 °C)]   Pulse:  []   Resp:  [13-25]   BP: ()/(31-75)   SpO2:  [94 %-98 %] .   Home meds for hypertension were reviewed and noted below.   Hypertension Medications               amLODIPine (NORVASC) 10 MG tablet Take 1 tablet by mouth once daily    labetaloL (NORMODYNE) 100 MG tablet Take 1 tablet (100 mg total) by mouth once daily.            While in the hospital, will manage blood pressure as follows; Adjust home antihypertensive regimen as follows- hold home Rx due to hypotension    Will utilize p.r.n. blood pressure medication only if patient's blood pressure greater than 180/110 and he develops symptoms such as worsening chest pain or shortness of breath.

## 2024-03-20 NOTE — PROGRESS NOTES
Russ Alanis (: 1939) is a 85 y.o. male, new patient, here for evaluation of the following chief complaint(s):  No chief complaint on file.       ASSESSMENT/PLAN:  Below is the assessment and plan developed based on review of pertinent history, physical exam, labs, studies, and medications.  Available imaging was independently reviewed including 3 views of the right elbow and CT scan of the elbow which showed a nondisplaced lateral condyle fracture with elbow effusion.  There is olecranon bursitis.    Discussed diagnosis of right nondisplaced distal humerus lateral condyle fracture with the patient and treatment options including nonoperative treatment.  Discussed that since this is so nondisplaced that should heal well on its own over 6 to 8 weeks.  Discussed that the elbow joint gets really stiff so do not recommend immobilization except in the sling for comfort.  He should perform range of motion activities to the elbow and wrist joint at least 3 times a day.  He should avoid lifting over 5 pounds with the right arm.  Continue ice, elevation, anti-inflammatories.  He is taking Norco to help sleep and a new prescription was sent to his pharmacy.  He will follow-up in 5 weeks with new right elbow x-rays.  All questions answered.    1. Closed nondisplaced fracture of lateral condyle of right humerus, initial encounter  -     HYDROcodone-acetaminophen (NORCO) 5-325 MG per tablet; Take 1 tablet by mouth every 6 hours as needed for Pain for up to 5 days. Max Daily Amount: 4 tablets, Disp-20 tablet, R-0Normal  -     XR ELBOW RIGHT (2 VIEWS); Future  -     FL CLTX HUMERAL CONDYLAR FX MEDIAL/LAT W/O MANJ      Return in about 5 years (around 3/20/2029).       SUBJECTIVE/OBJECTIVE:  Russ Alanis (: 1939) is a 85 y.o. male.    He is right hand dominant coming in with right elbow lateral condyle fracture after fall into door frame after losing his footing on 3/16/24.  He was seen in the ED where x-rays  .[employer]Kit was given to patient on 1/4/2019 2:49 PM

## 2024-03-20 NOTE — ASSESSMENT & PLAN NOTE
A1c:   Lab Results   Component Value Date    HGBA1C 5.9 (H) 10/26/2023     Meds: SSI PRN to maintain goal 140-180  ADA renal diet, accuchecks, hypoglycemic protocol

## 2024-03-20 NOTE — ASSESSMENT & PLAN NOTE
Patient has Paroxysmal (<7 days) atrial fibrillation which is uncontrolled. Patient is currently in atrial fibrillation.  - new onset Afib  - on labetalol for BP at home, eliquis for chronic DVT but did miss some doses of eliquis  - in ED given diltiazem gtt but that caused hypotension  - EKG with Afib RVR with normal Qtc  - plan start amio  - continue home eliquis  - Cardiology consulted  - check TSH, check TTE  - NPO at MD in case needs cardioversion      [id number] - VASc score:  Congestive Heart Failure or EF < 35% NO   Hypertension YES  +1   Age >= 75 NO   Diabetes Mellitus YES  +1   Stroke/TIA prior history YES  +2   Vascular disease (PAD, MI or Aortic Plaque)? YES  +1   Age 65 - 74 YES  +1   Female NO   Total 6

## 2024-03-20 NOTE — SUBJECTIVE & OBJECTIVE
Past Medical History:   Diagnosis Date    Allergy     Clotting disorder     Elaquis and APlavix    Deep vein thrombosis     Diabetes mellitus, type 2     Hypertension     Nuclear sclerosis of both eyes 6/9/2017    Renal disorder     Seizures     Stroke     Urinary tract infection        Past Surgical History:   Procedure Laterality Date    CATARACT EXTRACTION W/  INTRAOCULAR LENS IMPLANT Right 10/05/2017    Dr. Tay    CATARACT EXTRACTION W/  INTRAOCULAR LENS IMPLANT Left 10/19/2017    Dr. Tay    CYSTOSCOPY W/ RETROGRADES Bilateral 2/18/2021    Procedure: CYSTOSCOPY, WITH RETROGRADE PYELOGRAM;  Surgeon: JEMMA Styles MD;  Location: [facility] OR;  Service: Urology;  Laterality: Bilateral;  REQUESTING EARLY AS POSSIBE-LO / 2/8/2021 @ 1:13PM  RN Pre Op 2-11-21, Covid NEGATIVE ON  2-17-21.  C A    ESOPHAGOGASTRODUODENOSCOPY N/A 8/17/2020    Procedure: EGD (ESOPHAGOGASTRODUODENOSCOPY);  Surgeon: Desmond Chapman MD;  Location: [facility] ENDO;  Service: Endoscopy;  Laterality: N/A;    EYE SURGERY Bilateral     cataract    FEMORAL ARTERY STENT      FRACTURE SURGERY      KNEE SURGERY      bilateral scope       Review of patient's allergies indicates:   Allergen Reactions    Ace inhibitors      Hyperkalemia 8/2018  Other reaction(s): Unknown    Penicillins Hives    Tizanidine      Felt hot       No current facility-administered medications on file prior to encounter.     Current Outpatient Medications on File Prior to Encounter   Medication Sig    amLODIPine (NORVASC) 10 MG tablet Take 1 tablet by mouth once daily    apixaban (ELIQUIS) 5 mg Tab Take 1 tablet (5 mg total) by mouth 2 (two) times daily.    aspirin (ECOTRIN) 81 MG EC tablet Take 1 tablet (81 mg total) by mouth once daily.    atorvastatin (LIPITOR) 80 MG tablet Take 1 tablet (80 mg total) by mouth once daily.    finasteride (PROSCAR) 5 mg tablet Take 1 tablet (5 mg total) by mouth once daily.    iron sucrose in NS (VENOFER) 100 mg/100 mL PgBk 50 mg.     labetaloL (NORMODYNE) 100 MG tablet Take 1 tablet (100 mg total) by mouth once daily.    methoxy peg-epoetin beta (MIRCERA INJ) 75 mcg.    mupirocin (BACTROBAN) 2 % ointment Apply to affected area 3 times daily    RENVELA 800 mg Tab Take 1 tablet (800 mg total) by mouth 3 (three) times daily with meals.    sildenafiL (VIAGRA) 100 MG tablet Take 1 tablet (100 mg total) by mouth daily as needed.    tamsulosin (FLOMAX) 0.4 mg Cap Take 2 capsules (0.8 mg total) by mouth once daily.    [DISCONTINUED] blood sugar diagnostic Strp To check BG 1 times daily, to use with insurance preferred meter    [DISCONTINUED] blood-glucose meter kit To check BG 1 times daily, to use with insurance preferred meter    [DISCONTINUED] cholecalciferol, vitamin D3, (VITAMIN D3) 25 mcg (1,000 unit) capsule Take 1 capsule (1,000 Units total) by mouth once daily.    [DISCONTINUED] clotrimazole-betamethasone 1-0.05% (LOTRISONE) cream Apply topically 2 (two) times daily.    [DISCONTINUED] hydrocortisone (WESTCORT) 0.2 % cream Apply topically 2 (two) times daily.    [DISCONTINUED] lancets Misc To check BG 1 times daily, to use with insurance preferred meter    [DISCONTINUED] levETIRAcetam (KEPPRA) 250 MG Tab Take 1 tablet (250 mg total) by mouth 2 (two) times daily on Tuesday, Thursday, Saturday & Sunday. Take 2 tablets (500mg total) by mouth in the morning and 1 tablet (250mg total) in the evening on dialysis days (Monday, Wednesday, & Friday)     Family History       Problem Relation (Age of Onset)    Cataracts Mother    Diabetes Mother    Heart disease Mother    Hypertension Mother, Sister    No Known Problems Father, Brother, Maternal Aunt, Maternal Uncle, Paternal Aunt, Paternal Uncle, Maternal Grandmother, Maternal Grandfather, Paternal Grandmother, Paternal Grandfather, Daughter, Other          Tobacco Use    Smoking status: Never    Smokeless tobacco: Never   Substance and Sexual Activity    Alcohol use: No    Drug use: No    Sexual  activity: Not on file     Review of Systems   Constitutional:  Positive for activity change and fatigue. Negative for appetite change and fever.   Respiratory:  Negative for cough, chest tightness, shortness of breath and wheezing.    Cardiovascular:  Negative for chest pain, palpitations and leg swelling.   Gastrointestinal:  Negative for abdominal distention, abdominal pain, constipation, diarrhea, nausea and vomiting.   Genitourinary:  Positive for decreased urine volume.   Musculoskeletal:  Positive for arthralgias.   Skin:  Positive for color change. Negative for wound.   Neurological:  Positive for weakness.   Psychiatric/Behavioral:  Negative for confusion.      Objective:     Vital Signs (Most Recent):  Temp: 99.2 °F (37.3 °C) (03/20/24 1010)  Pulse: (!) 115 (03/20/24 1617)  Resp: (!) 24 (03/20/24 1617)  BP: (!) 89/56 (03/20/24 1617)  SpO2: 96 % (03/20/24 1617) Vital Signs (24h Range):  Temp:  [99.2 °F (37.3 °C)] 99.2 °F (37.3 °C)  Pulse:  [] 115  Resp:  [13-25] 24  SpO2:  [94 %-98 %] 96 %  BP: ()/(31-75) 89/56     Weight: 79.4 kg (175 lb)  Body mass index is 26.61 kg/m².     Physical Exam  Vitals and nursing note reviewed.   Constitutional:       General: He is not in acute distress.     Appearance: He is ill-appearing. He is not toxic-appearing.   HENT:      Head: Normocephalic and atraumatic.      Nose: Nose normal.      Mouth/Throat:      Mouth: Mucous membranes are moist.   Cardiovascular:      Rate and Rhythm: Tachycardia present. Rhythm irregular.      Pulses: Normal pulses.      Comments: AFib  Pulmonary:      Effort: Pulmonary effort is normal. No respiratory distress.      Breath sounds: Normal breath sounds. No wheezing, rhonchi or rales.      Comments: Room air  Abdominal:      General: Bowel sounds are normal. There is no distension.      Palpations: Abdomen is soft. There is no mass.      Tenderness: There is no abdominal tenderness. There is no guarding.   Musculoskeletal:          General: Swelling and tenderness (with R ankle palpation) present.      Right lower leg: Edema present.      Comments: R ankle swelling compared the left   Skin:     Comments: Hyperpigmentation and warmth to R ankle. LUE AVF with thrill   Neurological:      Mental Status: He is alert and oriented to person, place, and time. Mental status is at baseline.      Comments: R arm held in flexion                Significant Labs: All pertinent labs within the past 24 hours have been reviewed.    Significant Imaging: I have reviewed all pertinent imaging results/findings within the past 24 hours.

## 2024-03-21 PROBLEM — E87.5 HYPERKALEMIA: Status: RESOLVED | Noted: 2020-08-18 | Resolved: 2024-03-21

## 2024-03-21 PROBLEM — L03.115 CELLULITIS OF RIGHT FOOT: Status: ACTIVE | Noted: 2024-03-20

## 2024-03-21 LAB
ANION GAP SERPL CALC-SCNC: 9 MMOL/L (ref 8–16)
AV INDEX (PROSTH): 0.8
AV MEAN GRADIENT: 6 MMHG
AV PEAK GRADIENT: 12 MMHG
AV VALVE AREA BY VELOCITY RATIO: 3.15 CM²
AV VALVE AREA: 3.31 CM²
AV VELOCITY RATIO: 0.77
BASOPHILS # BLD AUTO: 0.03 K/UL (ref 0–0.2)
BASOPHILS NFR BLD: 0.4 % (ref 0–1.9)
BSA FOR ECHO PROCEDURE: 1.96 M2
BUN SERPL-MCNC: 38 MG/DL (ref 8–23)
CALCIUM SERPL-MCNC: 8.2 MG/DL (ref 8.7–10.5)
CHLORIDE SERPL-SCNC: 95 MMOL/L (ref 95–110)
CO2 SERPL-SCNC: 28 MMOL/L (ref 23–29)
CREAT SERPL-MCNC: 8.9 MG/DL (ref 0.5–1.4)
CV ECHO LV RWT: 0.32 CM
DIFFERENTIAL METHOD BLD: ABNORMAL
DOP CALC AO PEAK VEL: 1.71 M/S
DOP CALC AO VTI: 32.1 CM
DOP CALC LVOT AREA: 4.1 CM2
DOP CALC LVOT DIAMETER: 2.29 CM
DOP CALC LVOT PEAK VEL: 1.31 M/S
DOP CALC LVOT STROKE VOLUME: 106.21 CM3
DOP CALCLVOT PEAK VEL VTI: 25.8 CM
E WAVE DECELERATION TIME: 150.73 MSEC
E/A RATIO: 0.6
E/E' RATIO: 5.92 M/S
ECHO LV POSTERIOR WALL: 0.8 CM (ref 0.6–1.1)
EOSINOPHIL # BLD AUTO: 0 K/UL (ref 0–0.5)
EOSINOPHIL NFR BLD: 0 % (ref 0–8)
ERYTHROCYTE [DISTWIDTH] IN BLOOD BY AUTOMATED COUNT: 14.2 % (ref 11.5–14.5)
EST. GFR  (NO RACE VARIABLE): 6 ML/MIN/1.73 M^2
ESTIMATED AVG GLUCOSE: 120 MG/DL (ref 68–131)
FRACTIONAL SHORTENING: 28 % (ref 28–44)
GLUCOSE SERPL-MCNC: 120 MG/DL (ref 70–110)
HBA1C MFR BLD: 5.8 % (ref 4–5.6)
HCT VFR BLD AUTO: 33.5 % (ref 40–54)
HGB BLD-MCNC: 11.5 G/DL (ref 14–18)
IMM GRANULOCYTES # BLD AUTO: 0.02 K/UL (ref 0–0.04)
IMM GRANULOCYTES NFR BLD AUTO: 0.3 % (ref 0–0.5)
INTERVENTRICULAR SEPTUM: 0.8 CM (ref 0.6–1.1)
IVC DIAMETER: 1.45 CM
LA MAJOR: 5.54 CM
LA MINOR: 5.19 CM
LA WIDTH: 4.4 CM
LEFT ATRIUM SIZE: 3.57 CM
LEFT ATRIUM VOLUME INDEX: 36.9 ML/M2
LEFT ATRIUM VOLUME: 71.56 CM3
LEFT INTERNAL DIMENSION IN SYSTOLE: 3.59 CM (ref 2.1–4)
LEFT VENTRICLE DIASTOLIC VOLUME INDEX: 61.4 ML/M2
LEFT VENTRICLE DIASTOLIC VOLUME: 119.12 ML
LEFT VENTRICLE MASS INDEX: 70 G/M2
LEFT VENTRICLE SYSTOLIC VOLUME INDEX: 27.8 ML/M2
LEFT VENTRICLE SYSTOLIC VOLUME: 53.98 ML
LEFT VENTRICULAR INTERNAL DIMENSION IN DIASTOLE: 5.02 CM (ref 3.5–6)
LEFT VENTRICULAR MASS: 136.72 G
LV LATERAL E/E' RATIO: 4.81 M/S
LV SEPTAL E/E' RATIO: 7.7 M/S
LVOT MG: 3.43 MMHG
LVOT MV: 0.87 CM/S
LYMPHOCYTES # BLD AUTO: 0.3 K/UL (ref 1–4.8)
LYMPHOCYTES NFR BLD: 4.5 % (ref 18–48)
MCH RBC QN AUTO: 25.7 PG (ref 27–31)
MCHC RBC AUTO-ENTMCNC: 34.3 G/DL (ref 32–36)
MCV RBC AUTO: 75 FL (ref 82–98)
MONOCYTES # BLD AUTO: 0.6 K/UL (ref 0.3–1)
MONOCYTES NFR BLD: 8.4 % (ref 4–15)
MV PEAK A VEL: 1.28 M/S
MV PEAK E VEL: 0.77 M/S
MV STENOSIS PRESSURE HALF TIME: 43.71 MS
MV VALVE AREA P 1/2 METHOD: 5.03 CM2
NEUTROPHILS # BLD AUTO: 6.6 K/UL (ref 1.8–7.7)
NEUTROPHILS NFR BLD: 86.4 % (ref 38–73)
NRBC BLD-RTO: 0 /100 WBC
OHS QRS DURATION: 74 MS
OHS QRS DURATION: 80 MS
OHS QRS DURATION: 84 MS
OHS QTC CALCULATION: 420 MS
OHS QTC CALCULATION: 429 MS
OHS QTC CALCULATION: 431 MS
PHOSPHATE SERPL-MCNC: 3.2 MG/DL (ref 2.7–4.5)
PLATELET # BLD AUTO: 69 K/UL (ref 150–450)
PMV BLD AUTO: ABNORMAL FL (ref 9.2–12.9)
POCT GLUCOSE: 117 MG/DL (ref 70–110)
POCT GLUCOSE: 122 MG/DL (ref 70–110)
POCT GLUCOSE: 128 MG/DL (ref 70–110)
POCT GLUCOSE: 157 MG/DL (ref 70–110)
POCT GLUCOSE: 169 MG/DL (ref 70–110)
POTASSIUM SERPL-SCNC: 4.7 MMOL/L (ref 3.5–5.1)
RA MAJOR: 4.44 CM
RA PRESSURE ESTIMATED: 3 MMHG
RA WIDTH: 3.5 CM
RBC # BLD AUTO: 4.47 M/UL (ref 4.6–6.2)
RIGHT VENTRICULAR END-DIASTOLIC DIMENSION: 3.82 CM
RV TISSUE DOPPLER FREE WALL SYSTOLIC VELOCITY 1 (APICAL 4 CHAMBER VIEW): 10.22 CM/S
SINUS: 3.36 CM
SODIUM SERPL-SCNC: 132 MMOL/L (ref 136–145)
STJ: 2.89 CM
TDI LATERAL: 0.16 M/S
TDI SEPTAL: 0.1 M/S
TDI: 0.13 M/S
TRICUSPID ANNULAR PLANE SYSTOLIC EXCURSION: 2.33 CM
WBC # BLD AUTO: 7.58 K/UL (ref 3.9–12.7)
Z-SCORE OF LEFT VENTRICULAR DIMENSION IN END DIASTOLE: -0.93
Z-SCORE OF LEFT VENTRICULAR DIMENSION IN END SYSTOLE: 0.45

## 2024-03-21 PROCEDURE — 36415 COLL VENOUS BLD VENIPUNCTURE: CPT | Mod: HCNC | Performed by: HOSPITALIST

## 2024-03-21 PROCEDURE — 63600175 PHARM REV CODE 636 W HCPCS: Mod: HCNC | Performed by: HOSPITALIST

## 2024-03-21 PROCEDURE — 99291 CRITICAL CARE FIRST HOUR: CPT | Mod: HCNC,,, | Performed by: INTERNAL MEDICINE

## 2024-03-21 PROCEDURE — 93010 ELECTROCARDIOGRAM REPORT: CPT | Mod: HCNC,,, | Performed by: INTERNAL MEDICINE

## 2024-03-21 PROCEDURE — 93005 ELECTROCARDIOGRAM TRACING: CPT | Mod: HCNC

## 2024-03-21 PROCEDURE — 25000003 PHARM REV CODE 250: Mod: HCNC | Performed by: HOSPITALIST

## 2024-03-21 PROCEDURE — A4216 STERILE WATER/SALINE, 10 ML: HCPCS | Mod: HCNC | Performed by: HOSPITALIST

## 2024-03-21 PROCEDURE — 80048 BASIC METABOLIC PNL TOTAL CA: CPT | Mod: HCNC | Performed by: HOSPITALIST

## 2024-03-21 PROCEDURE — 85025 COMPLETE CBC W/AUTO DIFF WBC: CPT | Mod: HCNC | Performed by: HOSPITALIST

## 2024-03-21 PROCEDURE — 25000003 PHARM REV CODE 250: Mod: HCNC | Performed by: INTERNAL MEDICINE

## 2024-03-21 PROCEDURE — 84100 ASSAY OF PHOSPHORUS: CPT | Mod: HCNC | Performed by: HOSPITALIST

## 2024-03-21 PROCEDURE — 21400001 HC TELEMETRY ROOM: Mod: HCNC

## 2024-03-21 RX ORDER — COLCHICINE 0.6 MG/1
0.6 TABLET, FILM COATED ORAL ONCE
Status: COMPLETED | OUTPATIENT
Start: 2024-03-21 | End: 2024-03-21

## 2024-03-21 RX ORDER — AMIODARONE HYDROCHLORIDE 200 MG/1
200 TABLET ORAL 2 TIMES DAILY
Status: DISCONTINUED | OUTPATIENT
Start: 2024-03-21 | End: 2024-03-22

## 2024-03-21 RX ORDER — MUPIROCIN 20 MG/G
OINTMENT TOPICAL 2 TIMES DAILY
Status: COMPLETED | OUTPATIENT
Start: 2024-03-21 | End: 2024-03-25

## 2024-03-21 RX ORDER — AMLODIPINE BESYLATE 5 MG/1
10 TABLET ORAL DAILY
Status: DISCONTINUED | OUTPATIENT
Start: 2024-03-22 | End: 2024-03-24

## 2024-03-21 RX ORDER — LABETALOL 100 MG/1
100 TABLET, FILM COATED ORAL DAILY
Status: DISCONTINUED | OUTPATIENT
Start: 2024-03-21 | End: 2024-03-24

## 2024-03-21 RX ADMIN — APIXABAN 5 MG: 5 TABLET, FILM COATED ORAL at 08:03

## 2024-03-21 RX ADMIN — Medication 10 ML: at 06:03

## 2024-03-21 RX ADMIN — DOCUSATE SODIUM AND SENNOSIDES 1 TABLET: 8.6; 5 TABLET ORAL at 08:03

## 2024-03-21 RX ADMIN — AMIODARONE HYDROCHLORIDE 200 MG: 200 TABLET ORAL at 08:03

## 2024-03-21 RX ADMIN — ONDANSETRON 4 MG: 2 INJECTION INTRAMUSCULAR; INTRAVENOUS at 05:03

## 2024-03-21 RX ADMIN — CEFTRIAXONE 2 G: 2 INJECTION, POWDER, FOR SOLUTION INTRAMUSCULAR; INTRAVENOUS at 05:03

## 2024-03-21 RX ADMIN — FINASTERIDE 5 MG: 5 TABLET, FILM COATED ORAL at 08:03

## 2024-03-21 RX ADMIN — COLCHICINE 0.6 MG: 0.6 TABLET, FILM COATED ORAL at 11:03

## 2024-03-21 RX ADMIN — MUPIROCIN: 20 OINTMENT TOPICAL at 08:03

## 2024-03-21 RX ADMIN — SEVELAMER CARBONATE 800 MG: 800 TABLET, FILM COATED ORAL at 11:03

## 2024-03-21 RX ADMIN — TAMSULOSIN HYDROCHLORIDE 0.8 MG: 0.4 CAPSULE ORAL at 08:03

## 2024-03-21 RX ADMIN — MUPIROCIN: 20 OINTMENT TOPICAL at 10:03

## 2024-03-21 RX ADMIN — AMIODARONE HYDROCHLORIDE 0.5 MG/MIN: 1.8 INJECTION, SOLUTION INTRAVENOUS at 08:03

## 2024-03-21 RX ADMIN — ATORVASTATIN CALCIUM 80 MG: 40 TABLET, FILM COATED ORAL at 08:03

## 2024-03-21 RX ADMIN — AMIODARONE HYDROCHLORIDE 200 MG: 200 TABLET ORAL at 10:03

## 2024-03-21 RX ADMIN — LABETALOL HYDROCHLORIDE 100 MG: 100 TABLET, FILM COATED ORAL at 05:03

## 2024-03-21 NOTE — CONSULTS
[facility] - Intensive Care  Cardiology  Consult Note    Patient Name: Miguel Moore  MRN: [id number]  Admission Date: 3/20/2024  Hospital Length of Stay: 1 days  Code Status: Full Code   Attending Provider: Patricia Reddy MD   Consulting Provider: Elmer Zuniga MD  Primary Care Physician: Raciel Raymond MD  Principal Problem:Paroxysmal atrial fibrillation with RVR    Patient information was obtained from patient and ER records.     Inpatient consult to Cardiology  Consult performed by: Elmer Zuniga MD  Consult ordered by: Patricia Reddy MD        Subjective:     Chief Complaint:  A-fib            HPI: Mr Miguel Moore is a 68 y.o. man with ESRD on HD, HTN, DM with neuropathy, history of CVA with residual R sided weakness, chronic DVT on eliquis who presented with feeling poorly. He attended his usual dialysis session on Monday 3/18 without issues. He developed fatigue and R ankle swelling. He has some pain with palpation but is able to walk. No reports of trauma. No wounds. No falls. No history of gout. Today he was feeling worse so did not go to dialysis and came to the ED. No fevers. No shortness of breath. Normal appetite. No nausea, vomiting. No chest pain. No palpitations.      In the ED, afebrile with HR initially controlled then to 130s Afib RVR. Started diltiazem but became hypotensive. Diltiazem stopped. Given antibiotics. Admitted for further management.     Converted to  after IV amiodarone  Denies prior Hx A-fib  On eliquis for DVT  Denies CP or SOB  EKG A-fib 168 NSSTT changes  Not followed by a cardiologist    Echo 1/6/21  Mild left atrial enlargement.  The left ventricle is normal in size with normal systolic function. The estimated ejection fraction is 55%  Indeterminate left ventricular diastolic function.  Mild right atrial enlargement.  Normal right ventricular size with normal right ventricular systolic function.  The estimated PA systolic pressure is 20 mmHg.  Normal  central venous pressure (3 mmHg).  Trivial anterolateral pericardial effusion.    Past Medical History:   Diagnosis Date    Allergy     Clotting disorder     Elaquis and APlavix    Deep vein thrombosis     Diabetes mellitus, type 2     Hypertension     Nuclear sclerosis of both eyes 6/9/2017    Renal disorder     Seizures     Stroke     Urinary tract infection        Past Surgical History:   Procedure Laterality Date    CATARACT EXTRACTION W/  INTRAOCULAR LENS IMPLANT Right 10/05/2017    Dr. Tay    CATARACT EXTRACTION W/  INTRAOCULAR LENS IMPLANT Left 10/19/2017    Dr. aTy    CYSTOSCOPY W/ RETROGRADES Bilateral 2/18/2021    Procedure: CYSTOSCOPY, WITH RETROGRADE PYELOGRAM;  Surgeon: JEMMA Styles MD;  Location: [facility] OR;  Service: Urology;  Laterality: Bilateral;  REQUESTING EARLY AS POSSIBE-LO / 2/8/2021 @ 1:13PM  RN Pre Op 2-11-21, Covid NEGATIVE ON  2-17-21.  C A    ESOPHAGOGASTRODUODENOSCOPY N/A 8/17/2020    Procedure: EGD (ESOPHAGOGASTRODUODENOSCOPY);  Surgeon: Desmond Chapman MD;  Location: [facility] ENDO;  Service: Endoscopy;  Laterality: N/A;    EYE SURGERY Bilateral     cataract    FEMORAL ARTERY STENT      FRACTURE SURGERY      KNEE SURGERY      bilateral scope       Review of patient's allergies indicates:   Allergen Reactions    Ace inhibitors      Hyperkalemia 8/2018  Other reaction(s): Unknown    Penicillins Hives    Tizanidine      Felt hot       No current facility-administered medications on file prior to encounter.     Current Outpatient Medications on File Prior to Encounter   Medication Sig    amLODIPine (NORVASC) 10 MG tablet Take 1 tablet by mouth once daily    apixaban (ELIQUIS) 5 mg Tab Take 1 tablet (5 mg total) by mouth 2 (two) times daily.    aspirin (ECOTRIN) 81 MG EC tablet Take 1 tablet (81 mg total) by mouth once daily.    atorvastatin (LIPITOR) 80 MG tablet Take 1 tablet (80 mg total) by mouth once daily.    finasteride (PROSCAR) 5 mg tablet Take 1 tablet (5 mg total) by  mouth once daily.    iron sucrose in NS (VENOFER) 100 mg/100 mL PgBk 50 mg.    labetaloL (NORMODYNE) 100 MG tablet Take 1 tablet (100 mg total) by mouth once daily.    methoxy peg-epoetin beta (MIRCERA INJ) 75 mcg.    mupirocin (BACTROBAN) 2 % ointment Apply to affected area 3 times daily    RENVELA 800 mg Tab Take 1 tablet (800 mg total) by mouth 3 (three) times daily with meals.    sildenafiL (VIAGRA) 100 MG tablet Take 1 tablet (100 mg total) by mouth daily as needed.    tamsulosin (FLOMAX) 0.4 mg Cap Take 2 capsules (0.8 mg total) by mouth once daily.     Family History       Problem Relation (Age of Onset)    Cataracts Mother    Diabetes Mother    Heart disease Mother    Hypertension Mother, Sister    No Known Problems Father, Brother, Maternal Aunt, Maternal Uncle, Paternal Aunt, Paternal Uncle, Maternal Grandmother, Maternal Grandfather, Paternal Grandmother, Paternal Grandfather, Daughter, Other          Tobacco Use    Smoking status: Never    Smokeless tobacco: Never   Substance and Sexual Activity    Alcohol use: No    Drug use: No    Sexual activity: Not on file     Review of Systems   Constitutional: Negative for decreased appetite.   HENT:  Negative for ear discharge.    Eyes:  Negative for blurred vision.   Endocrine: Negative for polyphagia.   Skin:  Negative for nail changes.   Genitourinary:  Negative for bladder incontinence.   Neurological:  Negative for aphonia.   Psychiatric/Behavioral:  Negative for hallucinations.    Allergic/Immunologic: Negative for hives.     Objective:     Vital Signs (Most Recent):  Temp: 97.6 °F (36.4 °C) (03/21/24 0800)  Pulse: 93 (03/21/24 0930)  Resp: 19 (03/21/24 0930)  BP: 134/65 (03/21/24 0930)  SpO2: 100 % (03/21/24 0930) Vital Signs (24h Range):  Temp:  [97.6 °F (36.4 °C)-99.3 °F (37.4 °C)] 97.6 °F (36.4 °C)  Pulse:  [] 93  Resp:  [10-29] 19  SpO2:  [69 %-100 %] 100 %  BP: ()/(31-91) 134/65     Weight: 80.8 kg (178 lb 2.1 oz)  Body mass index is 27.08  kg/m².    SpO2: 100 %         Intake/Output Summary (Last 24 hours) at 3/21/2024 0953  Last data filed at 3/21/2024 0900  Gross per 24 hour   Intake 1755.78 ml   Output 1700 ml   Net 55.78 ml       Lines/Drains/Airways       Peripheral Intravenous Line  Duration                  Hemodialysis AV Fistula Left upper arm -- days         Peripheral IV - Single Lumen 03/20/24 1307 20 G Anterior;Proximal;Right Forearm <1 day         Peripheral IV - Single Lumen 03/20/24 1926 18 G Anterior;Right;Other (Comment) <1 day                     Physical Exam  Constitutional:       Appearance: He is well-developed.   HENT:      Head: Normocephalic and atraumatic.   Eyes:      Conjunctiva/sclera: Conjunctivae normal.      Pupils: Pupils are equal, round, and reactive to light.   Cardiovascular:      Rate and Rhythm: Normal rate.      Pulses: Intact distal pulses.      Heart sounds: Normal heart sounds.   Pulmonary:      Effort: Pulmonary effort is normal.      Breath sounds: Normal breath sounds.   Abdominal:      General: Bowel sounds are normal.      Palpations: Abdomen is soft.   Musculoskeletal:         General: Normal range of motion.      Cervical back: Normal range of motion and neck supple.   Skin:     General: Skin is warm and dry.   Neurological:      Mental Status: He is alert and oriented to person, place, and time.          Significant Labs: All pertinent lab results from the last 24 hours have been reviewed.    Significant Imaging: Echocardiogram: 2D echo with color flow doppler: No results found for this or any previous visit.  Assessment and Plan:     * Paroxysmal atrial fibrillation with RVR  A-fib converted to NSR after IV amiodarone. Change to po. Check echo. Ok for telemetry. Already on eliquis    ESRD (end stage renal disease)  Per renal    Hemiplegia and hemiparesis following cerebral infarction affecting right dominant side  Stable    Controlled type 2 diabetes mellitus with chronic kidney disease on chronic  dialysis, with long-term current use of insulin  Per primary    Dyslipidemia  On statin        VTE Risk Mitigation (From admission, onward)           Ordered     apixaban tablet 5 mg  2 times daily         03/20/24 1708                  35 minutes spent with patient in ICU    Thank you for your consult. I will follow-up with patient. Please contact us if you have any additional questions.    Elmer Zuniga MD  Cardiology   [facility] [place] - Intensive Care

## 2024-03-21 NOTE — HOSPITAL COURSE
68 year old man with ESRD on HD, DM and CVA with R sided weakness admitted on 3/21 for fatigue, hyperkalemia, found to have Afib with RVR. Started amio gtt with conversion to NSR. TTE EF 55-60%, mild LAE. Cardiology consulted. Transitioned on PO amiodarone and continues home eliquis (on this for chronic DVT). He received HD with resolution of hyperkalemia. Transferred out the ICU on 03/26. CT ankle from 3/28 suggestive of cellulitis, no evidence of osteomyelitis, pt had new drainage from R ankle and was found to have an abscess. Orthopedic surgery consulted. Pt S/p I&D 4/4 and 4/6 with OR cx with Ecoli. Operative noted on 4/6 notable for necrotic tissue around distal fibula. Interval improvement in fevers since surgery; however, has leukocytosis that is slowly improving. ID consulted-empiric MRSA coverage added given hx of MRSA. Orthopedic discontinued wound vac on 04/16. PT/OT recommends moderate intensity therapy, but patient declines SNF. Will need home health on discharge. He has elevated liver enzymes - unclear etiology at this time, he is on multiple medications that can cause hepatotoxicity (amio, atorvastatin, abx, etc). Hepatitis panel nonreactive. US abdomen with Hepatosplenomegaly. Blcx so far unrevealing. GI consulted. Amiodarone and statin held. LFTs started improving. Continue to hold statins at the time of dc till f/u with PCP. Wound vac was removed by ortho. Pt dc home with HH, IV abx with HD (vanc and ancef), wound care and OP fu with ID, ortho, pcp.

## 2024-03-21 NOTE — SUBJECTIVE & OBJECTIVE
Past Medical History:   Diagnosis Date    Allergy     Clotting disorder     Elaquis and APlavix    Deep vein thrombosis     Diabetes mellitus, type 2     Hypertension     Nuclear sclerosis of both eyes 6/9/2017    Renal disorder     Seizures     Stroke     Urinary tract infection        Past Surgical History:   Procedure Laterality Date    CATARACT EXTRACTION W/  INTRAOCULAR LENS IMPLANT Right 10/05/2017    Dr. Tay    CATARACT EXTRACTION W/  INTRAOCULAR LENS IMPLANT Left 10/19/2017    Dr. Tay    CYSTOSCOPY W/ RETROGRADES Bilateral 2/18/2021    Procedure: CYSTOSCOPY, WITH RETROGRADE PYELOGRAM;  Surgeon: JEMMA Styles MD;  Location: [facility] OR;  Service: Urology;  Laterality: Bilateral;  REQUESTING EARLY AS POSSIBE-LO / 2/8/2021 @ 1:13PM  RN Pre Op 2-11-21, Covid NEGATIVE ON  2-17-21.  C A    ESOPHAGOGASTRODUODENOSCOPY N/A 8/17/2020    Procedure: EGD (ESOPHAGOGASTRODUODENOSCOPY);  Surgeon: Desmond Chapman MD;  Location: [facility] ENDO;  Service: Endoscopy;  Laterality: N/A;    EYE SURGERY Bilateral     cataract    FEMORAL ARTERY STENT      FRACTURE SURGERY      KNEE SURGERY      bilateral scope       Review of patient's allergies indicates:   Allergen Reactions    Ace inhibitors      Hyperkalemia 8/2018  Other reaction(s): Unknown    Penicillins Hives    Tizanidine      Felt hot       No current facility-administered medications on file prior to encounter.     Current Outpatient Medications on File Prior to Encounter   Medication Sig    amLODIPine (NORVASC) 10 MG tablet Take 1 tablet by mouth once daily    apixaban (ELIQUIS) 5 mg Tab Take 1 tablet (5 mg total) by mouth 2 (two) times daily.    aspirin (ECOTRIN) 81 MG EC tablet Take 1 tablet (81 mg total) by mouth once daily.    atorvastatin (LIPITOR) 80 MG tablet Take 1 tablet (80 mg total) by mouth once daily.    finasteride (PROSCAR) 5 mg tablet Take 1 tablet (5 mg total) by mouth once daily.    iron sucrose in NS (VENOFER) 100 mg/100 mL PgBk 50 mg.     labetaloL (NORMODYNE) 100 MG tablet Take 1 tablet (100 mg total) by mouth once daily.    methoxy peg-epoetin beta (MIRCERA INJ) 75 mcg.    mupirocin (BACTROBAN) 2 % ointment Apply to affected area 3 times daily    RENVELA 800 mg Tab Take 1 tablet (800 mg total) by mouth 3 (three) times daily with meals.    sildenafiL (VIAGRA) 100 MG tablet Take 1 tablet (100 mg total) by mouth daily as needed.    tamsulosin (FLOMAX) 0.4 mg Cap Take 2 capsules (0.8 mg total) by mouth once daily.     Family History       Problem Relation (Age of Onset)    Cataracts Mother    Diabetes Mother    Heart disease Mother    Hypertension Mother, Sister    No Known Problems Father, Brother, Maternal Aunt, Maternal Uncle, Paternal Aunt, Paternal Uncle, Maternal Grandmother, Maternal Grandfather, Paternal Grandmother, Paternal Grandfather, Daughter, Other          Tobacco Use    Smoking status: Never    Smokeless tobacco: Never   Substance and Sexual Activity    Alcohol use: No    Drug use: No    Sexual activity: Not on file     Review of Systems   Constitutional: Negative for decreased appetite.   HENT:  Negative for ear discharge.    Eyes:  Negative for blurred vision.   Endocrine: Negative for polyphagia.   Skin:  Negative for nail changes.   Genitourinary:  Negative for bladder incontinence.   Neurological:  Negative for aphonia.   Psychiatric/Behavioral:  Negative for hallucinations.    Allergic/Immunologic: Negative for hives.     Objective:     Vital Signs (Most Recent):  Temp: 97.6 °F (36.4 °C) (03/21/24 0800)  Pulse: 93 (03/21/24 0930)  Resp: 19 (03/21/24 0930)  BP: 134/65 (03/21/24 0930)  SpO2: 100 % (03/21/24 0930) Vital Signs (24h Range):  Temp:  [97.6 °F (36.4 °C)-99.3 °F (37.4 °C)] 97.6 °F (36.4 °C)  Pulse:  [] 93  Resp:  [10-29] 19  SpO2:  [69 %-100 %] 100 %  BP: ()/(31-91) 134/65     Weight: 80.8 kg (178 lb 2.1 oz)  Body mass index is 27.08 kg/m².    SpO2: 100 %         Intake/Output Summary (Last 24 hours) at  3/21/2024 0953  Last data filed at 3/21/2024 0900  Gross per 24 hour   Intake 1755.78 ml   Output 1700 ml   Net 55.78 ml       Lines/Drains/Airways       Peripheral Intravenous Line  Duration                  Hemodialysis AV Fistula Left upper arm -- days         Peripheral IV - Single Lumen 03/20/24 1307 20 G Anterior;Proximal;Right Forearm <1 day         Peripheral IV - Single Lumen 03/20/24 1926 18 G Anterior;Right;Other (Comment) <1 day                     Physical Exam  Constitutional:       Appearance: He is well-developed.   HENT:      Head: Normocephalic and atraumatic.   Eyes:      Conjunctiva/sclera: Conjunctivae normal.      Pupils: Pupils are equal, round, and reactive to light.   Cardiovascular:      Rate and Rhythm: Normal rate.      Pulses: Intact distal pulses.      Heart sounds: Normal heart sounds.   Pulmonary:      Effort: Pulmonary effort is normal.      Breath sounds: Normal breath sounds.   Abdominal:      General: Bowel sounds are normal.      Palpations: Abdomen is soft.   Musculoskeletal:         General: Normal range of motion.      Cervical back: Normal range of motion and neck supple.   Skin:     General: Skin is warm and dry.   Neurological:      Mental Status: He is alert and oriented to person, place, and time.          Significant Labs: All pertinent lab results from the last 24 hours have been reviewed.    Significant Imaging: Echocardiogram: 2D echo with color flow doppler: No results found for this or any previous visit.

## 2024-03-21 NOTE — CONSULTS
68 y o m with hx of esrd htn anemia 2nd hyperpth admitted with R anklle and leg swelling x 2 days. Also with bandemia and hyperk, Renal consult was requested to help with HD as his HD schedule is MW.. Last pm he was dialyzed without issues. This am he is feeling better.    Denies injuries to his R foot or leg denies hx of gout.     Denies CNS ENT CP GI  OR DERM SX  Past Medical History:   Diagnosis Date    Allergy     Clotting disorder     Elaquis and APlavix    Deep vein thrombosis     Diabetes mellitus, type 2     Hypertension     Nuclear sclerosis of both eyes 6/9/2017    Renal disorder     Seizures     Stroke     Urinary tract infection      Past Surgical History:   Procedure Laterality Date    CATARACT EXTRACTION W/  INTRAOCULAR LENS IMPLANT Right 10/05/2017    Dr. Tay    CATARACT EXTRACTION W/  INTRAOCULAR LENS IMPLANT Left 10/19/2017    Dr. Tay    CYSTOSCOPY W/ RETROGRADES Bilateral 2/18/2021    Procedure: CYSTOSCOPY, WITH RETROGRADE PYELOGRAM;  Surgeon: JEMMA Styles MD;  Location: [facility] OR;  Service: Urology;  Laterality: Bilateral;  REQUESTING EARLY AS POSSIBE-LO / 2/8/2021 @ 1:13PM  RN Pre Op 2-11-21, Covid NEGATIVE ON  2-17-21.  C A    ESOPHAGOGASTRODUODENOSCOPY N/A 8/17/2020    Procedure: EGD (ESOPHAGOGASTRODUODENOSCOPY);  Surgeon: Desmond Chapman MD;  Location: [facility] ENDO;  Service: Endoscopy;  Laterality: N/A;    EYE SURGERY Bilateral     cataract    FEMORAL ARTERY STENT      FRACTURE SURGERY      KNEE SURGERY      bilateral scope     Social History     Socioeconomic History    Marital status: Single   Occupational History    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, etc   Tobacco Use    Smoking status: Never    Smokeless tobacco: Never   Substance and Sexual Activity    Alcohol use: No    Drug use: No   Social History Narrative    Lives with daughter     Family History   Problem Relation Age of Onset    Diabetes Mother     Heart disease Mother     Hypertension  Mother     Cataracts Mother     No Known Problems Father     Hypertension Sister     No Known Problems Brother     No Known Problems Maternal Aunt     No Known Problems Maternal Uncle     No Known Problems Paternal Aunt     No Known Problems Paternal Uncle     No Known Problems Maternal Grandmother     No Known Problems Maternal Grandfather     No Known Problems Paternal Grandmother     No Known Problems Paternal Grandfather     No Known Problems Daughter     No Known Problems Other     Amblyopia Neg Hx     Blindness Neg Hx     Cancer Neg Hx     Glaucoma Neg Hx     Macular degeneration Neg Hx     Retinal detachment Neg Hx     Strabismus Neg Hx     Stroke Neg Hx     Thyroid disease Neg Hx        Review of patient's allergies indicates:   Allergen Reactions    Ace inhibitors      Hyperkalemia 8/2018  Other reaction(s): Unknown    Penicillins Hives    Tizanidine      Felt hot       Current Facility-Administered Medications   Medication    acetaminophen tablet 650 mg    amiodarone tablet 200 mg    apixaban tablet 5 mg    atorvastatin tablet 80 mg    cefTRIAXone (ROCEPHIN) 2 g in dextrose 5 % in water (D5W) 100 mL IVPB (MB+)    dextrose 10% bolus 125 mL 125 mL    dextrose 10% bolus 250 mL 250 mL    finasteride tablet 5 mg    glucagon (human recombinant) injection 1 mg    glucose chewable tablet 16 g    glucose chewable tablet 24 g    insulin aspart U-100 pen 0-5 Units    melatonin tablet 6 mg    mupirocin 2 % ointment    naloxone 0.4 mg/mL injection 0.02 mg    ondansetron injection 4 mg    prochlorperazine injection Soln 5 mg    senna-docusate 8.6-50 mg per tablet 1 tablet    sevelamer carbonate tablet 800 mg    sodium chloride 0.9% bolus 250 mL 250 mL    sodium chloride 0.9% flush 10 mL    tamsulosin 24 hr capsule 0.8 mg       LABS    Recent Results (from the past 24 hour(s))   CBC auto differential    Collection Time: 03/20/24  1:13 PM   Result Value Ref Range    WBC 8.12 3.90 - 12.70 K/uL    RBC 4.62 4.60 - 6.20 M/uL     Hemoglobin 12.5 (L) 14.0 - 18.0 g/dL    Hematocrit 36.0 (L) 40.0 - 54.0 %    MCV 78 (L) 82 - 98 fL    MCH 27.1 27.0 - 31.0 pg    MCHC 34.7 32.0 - 36.0 g/dL    RDW 14.4 11.5 - 14.5 %    Platelets 74 (L) 150 - 450 K/uL    MPV 9.9 9.2 - 12.9 fL    Immature Granulocytes CANCELED 0.0 - 0.5 %    Immature Grans (Abs) CANCELED 0.00 - 0.04 K/uL    Lymph # CANCELED 1.0 - 4.8 K/uL    Mono # CANCELED 0.3 - 1.0 K/uL    Eos # CANCELED 0.0 - 0.5 K/uL    Baso # CANCELED 0.00 - 0.20 K/uL    nRBC 0 0 /100 WBC    Gran % 73.0 38.0 - 73.0 %    Lymph % 10.0 (L) 18.0 - 48.0 %    Mono % 6.0 4.0 - 15.0 %    Eosinophil % 0.0 0.0 - 8.0 %    Basophil % 0.0 0.0 - 1.9 %    Bands 11.0 %    Toxic Granulation Present     Differential Method Manual    Comprehensive metabolic panel    Collection Time: 03/20/24  1:13 PM   Result Value Ref Range    Sodium 133 (L) 136 - 145 mmol/L    Potassium 6.0 (H) 3.5 - 5.1 mmol/L    Chloride 93 (L) 95 - 110 mmol/L    CO2 25 23 - 29 mmol/L    Glucose 103 70 - 110 mg/dL    BUN 65 (H) 8 - 23 mg/dL    Creatinine 13.8 (H) 0.5 - 1.4 mg/dL    Calcium 9.1 8.7 - 10.5 mg/dL    Total Protein 6.3 6.0 - 8.4 g/dL    Albumin 2.7 (L) 3.5 - 5.2 g/dL    Total Bilirubin 1.0 0.1 - 1.0 mg/dL    Alkaline Phosphatase 61 55 - 135 U/L    AST 19 10 - 40 U/L    ALT 12 10 - 44 U/L    eGFR 3 (A) >60 mL/min/1.73 m^2    Anion Gap 15 8 - 16 mmol/L   Troponin I    Collection Time: 03/20/24  1:13 PM   Result Value Ref Range    Troponin I 0.108 (H) 0.000 - 0.026 ng/mL   Brain natriuretic peptide    Collection Time: 03/20/24  1:13 PM   Result Value Ref Range     (H) 0 - 99 pg/mL   Blood Culture #1 **CANNOT BE ORDERED STAT**    Collection Time: 03/20/24  2:49 PM    Specimen: Peripheral, Hand, Right; Blood   Result Value Ref Range    Blood Culture, Routine No Growth to date    Lactic acid, plasma    Collection Time: 03/20/24  2:49 PM   Result Value Ref Range    Lactate (Lactic Acid) 1.9 0.5 - 2.2 mmol/L   Blood Culture #2 **CANNOT BE ORDERED  STAT**    Collection Time: 03/20/24  2:50 PM    Specimen: Peripheral, Hand, Right; Blood   Result Value Ref Range    Blood Culture, Routine No Growth to date    Uric acid    Collection Time: 03/20/24  5:07 PM   Result Value Ref Range    Uric Acid 5.0 3.4 - 7.0 mg/dL   TSH    Collection Time: 03/20/24  5:24 PM   Result Value Ref Range    TSH 2.672 0.400 - 4.000 uIU/mL   POCT glucose    Collection Time: 03/20/24  9:26 PM   Result Value Ref Range    POCT Glucose 128 (H) 70 - 110 mg/dL   Basic Metabolic Panel (BMP)    Collection Time: 03/21/24  4:40 AM   Result Value Ref Range    Sodium 132 (L) 136 - 145 mmol/L    Potassium 4.7 3.5 - 5.1 mmol/L    Chloride 95 95 - 110 mmol/L    CO2 28 23 - 29 mmol/L    Glucose 120 (H) 70 - 110 mg/dL    BUN 38 (H) 8 - 23 mg/dL    Creatinine 8.9 (H) 0.5 - 1.4 mg/dL    Calcium 8.2 (L) 8.7 - 10.5 mg/dL    Anion Gap 9 8 - 16 mmol/L    eGFR 6 (A) >60 mL/min/1.73 m^2   Phosphorus    Collection Time: 03/21/24  4:40 AM   Result Value Ref Range    Phosphorus 3.2 2.7 - 4.5 mg/dL   CBC with Automated Differential    Collection Time: 03/21/24  4:40 AM   Result Value Ref Range    WBC 7.58 3.90 - 12.70 K/uL    RBC 4.47 (L) 4.60 - 6.20 M/uL    Hemoglobin 11.5 (L) 14.0 - 18.0 g/dL    Hematocrit 33.5 (L) 40.0 - 54.0 %    MCV 75 (L) 82 - 98 fL    MCH 25.7 (L) 27.0 - 31.0 pg    MCHC 34.3 32.0 - 36.0 g/dL    RDW 14.2 11.5 - 14.5 %    Platelets 69 (L) 150 - 450 K/uL    MPV SEE COMMENT 9.2 - 12.9 fL    Immature Granulocytes 0.3 0.0 - 0.5 %    Gran # (ANC) 6.6 1.8 - 7.7 K/uL    Immature Grans (Abs) 0.02 0.00 - 0.04 K/uL    Lymph # 0.3 (L) 1.0 - 4.8 K/uL    Mono # 0.6 0.3 - 1.0 K/uL    Eos # 0.0 0.0 - 0.5 K/uL    Baso # 0.03 0.00 - 0.20 K/uL    nRBC 0 0 /100 WBC    Gran % 86.4 (H) 38.0 - 73.0 %    Lymph % 4.5 (L) 18.0 - 48.0 %    Mono % 8.4 4.0 - 15.0 %    Eosinophil % 0.0 0.0 - 8.0 %    Basophil % 0.4 0.0 - 1.9 %    Differential Method Automated    POCT glucose    Collection Time: 03/21/24  6:24 AM   Result  Value Ref Range    POCT Glucose 117 (H) 70 - 110 mg/dL   ]    I/O last 3 completed shifts:  In: 1705.8 [I.V.:281.8; Other:500; IV Piggyback:924]  Out: 1700 [Other:1700]    Vitals:    03/21/24 0845 03/21/24 0900 03/21/24 0915 03/21/24 0930   BP:  124/64  134/65   Pulse: 97 95 97 93   Resp: (!) 24 (!) 21 20 19   Temp:       TempSrc:       SpO2: 97% 100% 99% 100%   Weight:       Height:           No Jvd, Thyromegaly or Lymphadenopathy  Lungs: Fairly clear anteriorly and laterally  Cor: RRR no G or rubs  Abd: Soft benign good bowel sounds non tender  Ext: R foot edematous and reddish sore ankle L leg no edema     A)    ESRD  Htn  Anemia  2nd hypth  Dm  Cellulitis R foot and lower leg  L shift   ? Gout (uric acid 5)  Hypoalb   Hx of high PSA     P)    HD MWF  Renal Diet  Home meds  Protect access  EPO prn (iron prn )  Binders  Adjust all meds to the degree of renal fx  Close follow up I/O and weights  Maintain Hydration   Antibiotx as needed

## 2024-03-21 NOTE — ASSESSMENT & PLAN NOTE
Chronic. Latest blood pressure and vitals reviewed-     Temp:  [97.6 °F (36.4 °C)-99.3 °F (37.4 °C)]   Pulse:  []   Resp:  [10-29]   BP: ()/(31-91)   SpO2:  [69 %-100 %] .   Home meds for hypertension were reviewed and noted below.   Hypertension Medications               amLODIPine (NORVASC) 10 MG tablet Take 1 tablet by mouth once daily    labetaloL (NORMODYNE) 100 MG tablet Take 1 tablet (100 mg total) by mouth once daily.          - BP Rx held on admit due to hypotension  - BP is now normal without Rx  - continue to monitor BP and resume home BP Rx as tolerated    Will utilize p.r.n. blood pressure medication only if patient's blood pressure greater than 180/110 and he develops symptoms such as worsening chest pain or shortness of breath.

## 2024-03-21 NOTE — ANESTHESIA PROCEDURE NOTES
Peripheral IV Insertion    Diagnosis: I87.9 Disorder of vein, unspecified. and I99.8 Other disorder of circulatory system    Patient location during procedure: ICU  Timeout: 3/20/2024 6:59 PM  Procedure end time: 3/20/2024 7:15 PM    Staffing  Authorizing Provider: David Chavarria II, MD  Performing Provider: David Chavarria II, MD    Staffing  Performed by: David Chavarria II, MD  Authorized by: David Chavarria II, MD    Anesthesiologist was present at the time of the procedure.    Preanesthetic Checklist  Completed: patient identified and risks and benefits discussedPeripheral IV Insertion  Skin Prep: alcohol swabs  Local Infiltration: none and lidocaine  Orientation: right  Location: external jugular  Catheter Type: peripheral IV (single lumen)  Catheter Size: 18 G  Catheter placement by Anatomical landmarks. Heme positive aspiration all ports. Insertion Attempts: 2  Assessment  Dressing: secured with tape and tegaderm  Patient: Tolerated well  Line flushed easily.

## 2024-03-21 NOTE — NURSING
[facility]  Nurses Note -- 4 Eyes      3/21/2024       Skin assessed on: Q Shift      [x] No Pressure Injuries Present    [x]Prevention Measures Documented    [] Yes LDA  for Pressure Injury Previously documented     [] Yes New Pressure Injury Discovered   [] LDA for New Pressure Injury Added      Attending RN:  Asia Mckeon RN     Second RN:  DARRION Fonseca

## 2024-03-21 NOTE — ASSESSMENT & PLAN NOTE
This patient has hyperkalemia which is uncontrolled. We will monitor for arrhythmias with EKG or continuous telemetry. We will treat the hyperkalemia with Potassium Binders and dialysis . The likely etiology of the hyperkalemia is ESRD with missed dialysis session.  The patients latest potassium has been reviewed and the results are listed below  Recent Labs   Lab 03/21/24  0440   K 4.7     - Nephrology consulted for HD  - sodium zirc x1 now

## 2024-03-21 NOTE — HPI
HPI: Mr Miguel Moore is a 68 y.o. man with ESRD on HD, HTN, DM with neuropathy, history of CVA with residual R sided weakness, chronic DVT on eliquis who presented with feeling poorly. He attended his usual dialysis session on Monday 3/18 without issues. He developed fatigue and R ankle swelling. He has some pain with palpation but is able to walk. No reports of trauma. No wounds. No falls. No history of gout. Today he was feeling worse so did not go to dialysis and came to the ED. No fevers. No shortness of breath. Normal appetite. No nausea, vomiting. No chest pain. No palpitations.      In the ED, afebrile with HR initially controlled then to 130s Afib RVR. Started diltiazem but became hypotensive. Diltiazem stopped. Given antibiotics. Admitted for further management.     Converted to  after IV amiodarone  Denies prior Hx A-fib  On eliquis for DVT  Denies CP or SOB  EKG A-fib 168 NSSTT changes  Not followed by a cardiologist    Echo 1/6/21  Mild left atrial enlargement.  The left ventricle is normal in size with normal systolic function. The estimated ejection fraction is 55%  Indeterminate left ventricular diastolic function.  Mild right atrial enlargement.  Normal right ventricular size with normal right ventricular systolic function.  The estimated PA systolic pressure is 20 mmHg.  Normal central venous pressure (3 mmHg).  Trivial anterolateral pericardial effusion.

## 2024-03-21 NOTE — ASSESSMENT & PLAN NOTE
Patient has Paroxysmal (<7 days) atrial fibrillation which is uncontrolled. Patient is currently in atrial fibrillation.  - new onset Afib  - on labetalol for BP at home, eliquis for chronic DVT but did miss some doses of eliquis  - in ED given diltiazem gtt but that caused hypotension  - EKG with Afib RVR with normal Qtc  - started amiodarone with conversion to NSR  - now on PO amiodarone   - continue home eliquis  - Cardiology consulted    Lab Results   Component Value Date    TSH 2.672 03/20/2024     - TTE pending       [id number] - VASc score:  Congestive Heart Failure or EF < 35% NO   Hypertension YES  +1   Age >= 75 NO   Diabetes Mellitus YES  +1   Stroke/TIA prior history YES  +2   Vascular disease (PAD, MI or Aortic Plaque)? YES  +1   Age 65 - 74 YES  +1   Female NO   Total 6

## 2024-03-21 NOTE — PROGRESS NOTES
[facility]  Progress Note    Patient Name: Miguel Moore  MRN: [id number]  Patient Class: IP- Inpatient   Admission Date: 3/20/2024  Length of Stay: 1 days  Attending Physician: Patricia Reddy MD  Primary Care Provider: Raciel Raymond MD        Subjective:     Principal Problem:Paroxysmal atrial fibrillation with RVR        HPI:  Mr Miguel Moore is a 68 y.o. man with ESRD on HD, HTN, DM with neuropathy, history of CVA with residual R sided weakness, chronic DVT on eliquis who presented with feeling poorly. He attended his usual dialysis session on Monday 3/18 without issues. He developed fatigue and R ankle swelling. He has some pain with palpation but is able to walk. No reports of trauma. No wounds. No falls. No history of gout. Today he was feeling worse so did not go to dialysis and came to the ED. No fevers. No shortness of breath. Normal appetite. No nausea, vomiting. No chest pain. No palpitations.     In the ED, afebrile with HR initially controlled then to 130s Afib RVR. Started diltiazem but became hypotensive. Diltiazem stopped. Given antibiotics. Admitted for further management.     Overview/Hospital Course:  Mr Miguel Moore is a 68 y.o. man with ESRD on HD, DM who was admitted with new onset Afib RVR, hyperkalemia from missed HD session, and R ankle cellulitis. Started amio gtt with conversion to NSR. TTE pending. Cardiology consulted. Now on PO amiodarone and continues home eliquis (on this for chronic DVT). He received HD with resolution of hyperkalemia. He is on CTX for his R ankle cellulitis which is still present. Stepped down to floor.     Interval History: Overall feeling better today. He is tired. He still has pain and swelling in his R ankle.     Review of Systems   Constitutional:  Positive for fatigue. Negative for chills and fever.   Respiratory:  Negative for shortness of breath.    Cardiovascular:  Negative for chest pain.   Gastrointestinal:   Negative for abdominal pain.   Musculoskeletal:  Positive for arthralgias.   Skin:  Positive for rash.   Neurological:  Positive for weakness.   Psychiatric/Behavioral:  Negative for confusion.      Objective:     Vital Signs (Most Recent):  Temp: 97.6 °F (36.4 °C) (03/21/24 0800)  Pulse: 103 (03/21/24 1100)  Resp: (!) 26 (03/21/24 1100)  BP: 138/63 (03/21/24 1000)  SpO2: 97 % (03/21/24 1100) Vital Signs (24h Range):  Temp:  [97.6 °F (36.4 °C)-99.3 °F (37.4 °C)] 97.6 °F (36.4 °C)  Pulse:  [] 103  Resp:  [10-29] 26  SpO2:  [69 %-100 %] 97 %  BP: ()/(31-91) 138/63     Weight: 80 kg (176 lb 5.9 oz)  Body mass index is 26.82 kg/m².    Intake/Output Summary (Last 24 hours) at 3/21/2024 1101  Last data filed at 3/21/2024 1032  Gross per 24 hour   Intake 1775.71 ml   Output 1700 ml   Net 75.71 ml         Physical Exam  Vitals reviewed.   Constitutional:       General: He is not in acute distress.     Appearance: He is ill-appearing. He is not toxic-appearing.   HENT:      Head: Normocephalic and atraumatic.      Nose: Nose normal.      Mouth/Throat:      Mouth: Mucous membranes are moist.   Cardiovascular:      Rate and Rhythm: Normal rate and regular rhythm.      Pulses: Normal pulses.      Heart sounds: Normal heart sounds.      Comments: NSR  Pulmonary:      Effort: Pulmonary effort is normal. No respiratory distress.      Breath sounds: No wheezing, rhonchi or rales.      Comments: Room air  Abdominal:      General: Bowel sounds are normal.      Palpations: Abdomen is soft.   Musculoskeletal:         General: Swelling (R ankle) and tenderness (R ankle) present.      Right lower leg: No edema.      Left lower leg: No edema.   Skin:     Comments: R ankle hyperpigmentation, warm to touch. LUE AVF   Neurological:      Mental Status: He is alert. Mental status is at baseline.      Motor: Weakness (RUE) present.             Significant Labs: All pertinent labs within the past 24 hours have been  reviewed.    Significant Imaging: I have reviewed all pertinent imaging results/findings within the past 24 hours.    Assessment/Plan:      * Paroxysmal atrial fibrillation with RVR  Patient has Paroxysmal (<7 days) atrial fibrillation which is uncontrolled. Patient is currently in atrial fibrillation.  - new onset Afib  - on labetalol for BP at home, eliquis for chronic DVT but did miss some doses of eliquis  - in ED given diltiazem gtt but that caused hypotension  - EKG with Afib RVR with normal Qtc  - started amiodarone with conversion to NSR  - now on PO amiodarone   - continue home eliquis  - Cardiology consulted    Lab Results   Component Value Date    TSH 2.672 03/20/2024     - TTE pending       [id number] - VASc score:  Congestive Heart Failure or EF < 35% NO   Hypertension YES  +1   Age >= 75 NO   Diabetes Mellitus YES  +1   Stroke/TIA prior history YES  +2   Vascular disease (PAD, MI or Aortic Plaque)? YES  +1   Age 65 - 74 YES  +1   Female NO   Total 6         Cellulitis of right foot  R ankle pain, swelling, warmth present. Potential gout vs cellulitis. 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 No fracture present. Uric acid normal. Arterial US and venous US with no clots, appropriate flow  - continue CTX for suspected cellulitis  - colchicine x1 dose   - blood cultures currently NGTD  - PT, OT consulted       Anemia in chronic kidney disease  Patient's anemia is currently controlled. Has not received any PRBCs to date. Etiology likely d/t chronic disease due to ESRD  Current CBC reviewed-   Lab Results   Component Value Date    HGB 11.5 (L) 03/21/2024    HCT 33.5 (L) 03/21/2024     Monitor serial CBC and transfuse if patient becomes hemodynamically unstable, symptomatic or H/H drops below 7/21.    Secondary hyperparathyroidism of renal origin  Continue renvela       ESRD (end stage renal disease)  ESRD via LUE AVF. Last session 3/18/24. Missed 3/20/24 due to fatigue. Usually  MWF  - hyperkalemic on admit. Does not appear volume overloaded.   - Nephrology Dr Currie consulted. Received HD on 3/20. K normalized.   - continue MWF HD    Hemiplegia and hemiparesis following cerebral infarction affecting right dominant side  No signs of acute stroke. Does have RUE weakness.   - continue home asa, eliquis, statin      Seizure disorder as sequela of cerebrovascular accident  Not currently on antiepileptics. No seizure activity reported. Monitor.       Controlled type 2 diabetes mellitus with chronic kidney disease on chronic dialysis, with long-term current use of insulin  A1c:   Lab Results   Component Value Date    HGBA1C 5.9 (H) 10/26/2023     Meds: SSI PRN to maintain goal 140-180  ADA renal diet, accuchecks, hypoglycemic protocol      Dyslipidemia  Continue statin      Benign essential HTN  Chronic. Latest blood pressure and vitals reviewed-     Temp:  [97.6 °F (36.4 °C)-99.3 °F (37.4 °C)]   Pulse:  []   Resp:  [10-29]   BP: ()/(31-91)   SpO2:  [69 %-100 %] .   Home meds for hypertension were reviewed and noted below.   Hypertension Medications               amLODIPine (NORVASC) 10 MG tablet Take 1 tablet by mouth once daily    labetaloL (NORMODYNE) 100 MG tablet Take 1 tablet (100 mg total) by mouth once daily.          - BP Rx held on admit due to hypotension  - BP is now normal without Rx  - continue to monitor BP and resume home BP Rx as tolerated    Will utilize p.r.n. blood pressure medication only if patient's blood pressure greater than 180/110 and he develops symptoms such as worsening chest pain or shortness of breath.      VTE Risk Mitigation (From admission, onward)           Ordered     apixaban tablet 5 mg  2 times daily         03/20/24 1702                    Discharge Planning   GARY:      Code Status: Full Code   Is the patient medically ready for discharge?:     Reason for patient still in hospital (select all that apply): Patient trending condition                Critical care time spent on the evaluation and treatment of severe organ dysfunction, review of pertinent labs and imaging studies, discussions with consulting providers and discussions with patient/family: 30 minutes.      Patricia Reddy MD  Department of Hospital Medicine   [facility] - Intensive Care

## 2024-03-21 NOTE — PLAN OF CARE
Patient remains in ICU on room air. AAO x4. VSS and afebrile. Patient received hemodialysis, Pt tolerated well per HD RN handoff. No urine output, no BM. CHG bath given, linens changed. NPO beginning at 3/21/24 @ 0001. Free of injuries, falls, and skin breakdown on this shift.

## 2024-03-21 NOTE — ASSESSMENT & PLAN NOTE
R ankle pain, swelling, warmth present. Potential gout vs cellulitis. 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 No fracture present. Uric acid normal. Arterial US and venous US with no clots, appropriate flow  - continue CTX for suspected cellulitis  - colchicine x1 dose   - blood cultures currently NGTD  - PT, OT consulted

## 2024-03-21 NOTE — PLAN OF CARE
Case Management Assessment     PCP: Raciel Raymond  Pharmacy: [facility] [address]    Patient Arrived From: Home  Existing Help at Home: Independent, lives with his daughter Maddy.    Barriers to Discharge: None    Discharge Plan:    A. Home with family   B. Home with family      Initial CM assessment completed with pt at bedside. Pt reports having a cane and w/c at home.  Pt states he uses [employer] for transportation, or his daughter brings him to appointments.           03/21/24 1130   Discharge Assessment   Assessment Type Discharge Planning Assessment   Confirmed/corrected address, phone number and insurance Yes   Confirmed Demographics Correct on Facesheet   Source of Information patient   Reason For Admission Afib with RVR   People in Home significant other   Do you expect to return to your current living situation? Yes   Do you have help at home or someone to help you manage your care at home? Yes  (Independent)   Who are your caregiver(s) and their phone number(s)? Daughter- Maddy Valdes [phone]   Prior to hospitilization cognitive status: Alert/Oriented   Current cognitive status: Alert/Oriented   Equipment Currently Used at Home wheelchair;cane, straight   Readmission within 30 days? No   Patient currently being followed by outpatient case management? No   Do you currently have service(s) that help you manage your care at home? No   Do you take prescription medications? Yes   Do you have prescription coverage? Yes   Who is going to help you get home at discharge? Margie Castañeda or Erick Devine [phone]   How do you get to doctors appointments? public transportation  ([employer])   Are you on dialysis? Yes   Dialysis Name and Scheduled days [facility] on Monday, Wednesday and Fridays,   Do you take coumadin? No   Discharge Plan A Home with family   Discharge Plan B Home with family   DME Needed Upon Discharge  none   Discharge Plan discussed with: Patient   Transition of Care Barriers None

## 2024-03-21 NOTE — SUBJECTIVE & OBJECTIVE
Interval History: Overall feeling better today. He is tired. He still has pain and swelling in his R ankle.     Review of Systems   Constitutional:  Positive for fatigue. Negative for chills and fever.   Respiratory:  Negative for shortness of breath.    Cardiovascular:  Negative for chest pain.   Gastrointestinal:  Negative for abdominal pain.   Musculoskeletal:  Positive for arthralgias.   Skin:  Positive for rash.   Neurological:  Positive for weakness.   Psychiatric/Behavioral:  Negative for confusion.      Objective:     Vital Signs (Most Recent):  Temp: 97.6 °F (36.4 °C) (03/21/24 0800)  Pulse: 103 (03/21/24 1100)  Resp: (!) 26 (03/21/24 1100)  BP: 138/63 (03/21/24 1000)  SpO2: 97 % (03/21/24 1100) Vital Signs (24h Range):  Temp:  [97.6 °F (36.4 °C)-99.3 °F (37.4 °C)] 97.6 °F (36.4 °C)  Pulse:  [] 103  Resp:  [10-29] 26  SpO2:  [69 %-100 %] 97 %  BP: ()/(31-91) 138/63     Weight: 80 kg (176 lb 5.9 oz)  Body mass index is 26.82 kg/m².    Intake/Output Summary (Last 24 hours) at 3/21/2024 1101  Last data filed at 3/21/2024 1032  Gross per 24 hour   Intake 1775.71 ml   Output 1700 ml   Net 75.71 ml         Physical Exam  Vitals reviewed.   Constitutional:       General: He is not in acute distress.     Appearance: He is ill-appearing. He is not toxic-appearing.   HENT:      Head: Normocephalic and atraumatic.      Nose: Nose normal.      Mouth/Throat:      Mouth: Mucous membranes are moist.   Cardiovascular:      Rate and Rhythm: Normal rate and regular rhythm.      Pulses: Normal pulses.      Heart sounds: Normal heart sounds.      Comments: NSR  Pulmonary:      Effort: Pulmonary effort is normal. No respiratory distress.      Breath sounds: No wheezing, rhonchi or rales.      Comments: Room air  Abdominal:      General: Bowel sounds are normal.      Palpations: Abdomen is soft.   Musculoskeletal:         General: Swelling (R ankle) and tenderness (R ankle) present.      Right lower leg: No edema.       Left lower leg: No edema.   Skin:     Comments: R ankle hyperpigmentation, warm to touch. LUE AVF   Neurological:      Mental Status: He is alert. Mental status is at baseline.      Motor: Weakness (RUE) present.             Significant Labs: All pertinent labs within the past 24 hours have been reviewed.    Significant Imaging: I have reviewed all pertinent imaging results/findings within the past 24 hours.   Statement Selected

## 2024-03-21 NOTE — ASSESSMENT & PLAN NOTE
A-fib converted to NSR after IV amiodarone. Change to po. Check echo. Ok for telemetry. Already on eliquis

## 2024-03-21 NOTE — ASSESSMENT & PLAN NOTE
ESRD via LUE AVF. Last session 3/18/24. Missed 3/20/24 due to fatigue. Usually MWF  - hyperkalemic on admit. Does not appear volume overloaded.   - Nephrology Dr Currie consulted. Received HD on 3/20. K normalized.   - continue MWF HD

## 2024-03-21 NOTE — ASSESSMENT & PLAN NOTE
Patient's anemia is currently controlled. Has not received any PRBCs to date. Etiology likely d/t chronic disease due to ESRD  Current CBC reviewed-   Lab Results   Component Value Date    HGB 11.5 (L) 03/21/2024    HCT 33.5 (L) 03/21/2024     Monitor serial CBC and transfuse if patient becomes hemodynamically unstable, symptomatic or H/H drops below 7/21.

## 2024-03-21 NOTE — PLAN OF CARE
Pt remains in the ICU on RA.  NSR in the monitor.  Afebrile.  Aox4.  Diet advanced.  Pt with 1 episode of n/v, PRN zofran received with full relief obtained.  Family at bedside, plan of care reviewed.  No injuries, falls or skin breakdown occurred this shift.

## 2024-03-21 NOTE — PROVIDER TRANSFER
Mr Miguel Moore is a 68 y.o. man with ESRD on HD, DM who was admitted with new onset Afib RVR, hyperkalemia from missed HD session, and R ankle cellulitis. Started amio gtt with conversion to NSR. TTE pending. Cardiology consulted. Now on PO amiodarone and continues home eliquis (on this for chronic DVT). He received HD with resolution of hyperkalemia. He is on CTX for his R ankle cellulitis which is still present. Stepped down to floor.      To do  - follow up TTE  - needs amio Rx upon discharge  - monitor R ankle cellulitis, currently on CTX  - PT, OT consulted    Patricia Reddy MD  03/21/2024 11:08 AM

## 2024-03-22 LAB
ANION GAP SERPL CALC-SCNC: 11 MMOL/L (ref 8–16)
BASOPHILS # BLD AUTO: 0.01 K/UL (ref 0–0.2)
BASOPHILS NFR BLD: 0.1 % (ref 0–1.9)
BUN SERPL-MCNC: 52 MG/DL (ref 8–23)
CALCIUM SERPL-MCNC: 8 MG/DL (ref 8.7–10.5)
CHLORIDE SERPL-SCNC: 94 MMOL/L (ref 95–110)
CO2 SERPL-SCNC: 26 MMOL/L (ref 23–29)
CREAT SERPL-MCNC: 10.7 MG/DL (ref 0.5–1.4)
DIFFERENTIAL METHOD BLD: ABNORMAL
EOSINOPHIL # BLD AUTO: 0 K/UL (ref 0–0.5)
EOSINOPHIL NFR BLD: 0 % (ref 0–8)
ERYTHROCYTE [DISTWIDTH] IN BLOOD BY AUTOMATED COUNT: 13.7 % (ref 11.5–14.5)
EST. GFR  (NO RACE VARIABLE): 5 ML/MIN/1.73 M^2
GLUCOSE SERPL-MCNC: 145 MG/DL (ref 70–110)
HCT VFR BLD AUTO: 29.3 % (ref 40–54)
HGB BLD-MCNC: 10.6 G/DL (ref 14–18)
IMM GRANULOCYTES # BLD AUTO: 0.33 K/UL (ref 0–0.04)
IMM GRANULOCYTES NFR BLD AUTO: 4 % (ref 0–0.5)
LYMPHOCYTES # BLD AUTO: 0.4 K/UL (ref 1–4.8)
LYMPHOCYTES NFR BLD: 5.2 % (ref 18–48)
MCH RBC QN AUTO: 26.8 PG (ref 27–31)
MCHC RBC AUTO-ENTMCNC: 36.2 G/DL (ref 32–36)
MCV RBC AUTO: 74 FL (ref 82–98)
MONOCYTES # BLD AUTO: 1 K/UL (ref 0.3–1)
MONOCYTES NFR BLD: 11.6 % (ref 4–15)
NEUTROPHILS # BLD AUTO: 6.6 K/UL (ref 1.8–7.7)
NEUTROPHILS NFR BLD: 79.1 % (ref 38–73)
NRBC BLD-RTO: 0 /100 WBC
PHOSPHATE SERPL-MCNC: 3.1 MG/DL (ref 2.7–4.5)
PLATELET # BLD AUTO: 81 K/UL (ref 150–450)
PMV BLD AUTO: 10.9 FL (ref 9.2–12.9)
POCT GLUCOSE: 139 MG/DL (ref 70–110)
POCT GLUCOSE: 173 MG/DL (ref 70–110)
POCT GLUCOSE: 188 MG/DL (ref 70–110)
POCT GLUCOSE: 193 MG/DL (ref 70–110)
POTASSIUM SERPL-SCNC: 4.5 MMOL/L (ref 3.5–5.1)
RBC # BLD AUTO: 3.96 M/UL (ref 4.6–6.2)
SODIUM SERPL-SCNC: 131 MMOL/L (ref 136–145)
WBC # BLD AUTO: 8.29 K/UL (ref 3.9–12.7)

## 2024-03-22 PROCEDURE — 36415 COLL VENOUS BLD VENIPUNCTURE: CPT | Mod: HCNC | Performed by: HOSPITALIST

## 2024-03-22 PROCEDURE — 25000003 PHARM REV CODE 250: Mod: HCNC | Performed by: HOSPITALIST

## 2024-03-22 PROCEDURE — 99291 CRITICAL CARE FIRST HOUR: CPT | Mod: HCNC,,, | Performed by: INTERNAL MEDICINE

## 2024-03-22 PROCEDURE — 85025 COMPLETE CBC W/AUTO DIFF WBC: CPT | Mod: HCNC | Performed by: HOSPITALIST

## 2024-03-22 PROCEDURE — 99499 UNLISTED E&M SERVICE: CPT | Mod: HCNC,,, | Performed by: INTERNAL MEDICINE

## 2024-03-22 PROCEDURE — 63600175 PHARM REV CODE 636 W HCPCS: Mod: HCNC | Performed by: HOSPITALIST

## 2024-03-22 PROCEDURE — 80048 BASIC METABOLIC PNL TOTAL CA: CPT | Mod: HCNC | Performed by: HOSPITALIST

## 2024-03-22 PROCEDURE — 94761 N-INVAS EAR/PLS OXIMETRY MLT: CPT | Mod: HCNC

## 2024-03-22 PROCEDURE — 27000221 HC OXYGEN, UP TO 24 HOURS: Mod: HCNC

## 2024-03-22 PROCEDURE — 63600175 PHARM REV CODE 636 W HCPCS: Mod: HCNC | Performed by: STUDENT IN AN ORGANIZED HEALTH CARE EDUCATION/TRAINING PROGRAM

## 2024-03-22 PROCEDURE — 25000003 PHARM REV CODE 250: Mod: HCNC | Performed by: STUDENT IN AN ORGANIZED HEALTH CARE EDUCATION/TRAINING PROGRAM

## 2024-03-22 PROCEDURE — 25000003 PHARM REV CODE 250: Mod: HCNC | Performed by: INTERNAL MEDICINE

## 2024-03-22 PROCEDURE — 84100 ASSAY OF PHOSPHORUS: CPT | Mod: HCNC | Performed by: HOSPITALIST

## 2024-03-22 PROCEDURE — 21400001 HC TELEMETRY ROOM: Mod: HCNC

## 2024-03-22 PROCEDURE — 80100014 HC HEMODIALYSIS 1:1: Mod: HCNC

## 2024-03-22 PROCEDURE — A4216 STERILE WATER/SALINE, 10 ML: HCPCS | Mod: HCNC | Performed by: HOSPITALIST

## 2024-03-22 RX ORDER — AMIODARONE HYDROCHLORIDE 200 MG/1
400 TABLET ORAL 2 TIMES DAILY
Status: DISCONTINUED | OUTPATIENT
Start: 2024-03-22 | End: 2024-03-22

## 2024-03-22 RX ORDER — DILTIAZEM HYDROCHLORIDE 5 MG/ML
15 INJECTION INTRAVENOUS ONCE
Status: COMPLETED | OUTPATIENT
Start: 2024-03-22 | End: 2024-03-22

## 2024-03-22 RX ORDER — COLCHICINE 0.6 MG/1
0.6 TABLET, FILM COATED ORAL DAILY
Status: COMPLETED | OUTPATIENT
Start: 2024-03-22 | End: 2024-03-22

## 2024-03-22 RX ADMIN — AMIODARONE HYDROCHLORIDE 400 MG: 200 TABLET ORAL at 08:03

## 2024-03-22 RX ADMIN — AMIODARONE HYDROCHLORIDE 1 MG/MIN: 1.8 INJECTION, SOLUTION INTRAVENOUS at 09:03

## 2024-03-22 RX ADMIN — SEVELAMER CARBONATE 800 MG: 800 TABLET, FILM COATED ORAL at 02:03

## 2024-03-22 RX ADMIN — APIXABAN 5 MG: 5 TABLET, FILM COATED ORAL at 08:03

## 2024-03-22 RX ADMIN — ATORVASTATIN CALCIUM 80 MG: 40 TABLET, FILM COATED ORAL at 08:03

## 2024-03-22 RX ADMIN — DILTIAZEM HYDROCHLORIDE 15 MG: 5 INJECTION INTRAVENOUS at 03:03

## 2024-03-22 RX ADMIN — AMIODARONE HYDROCHLORIDE 150 MG: 1.5 INJECTION, SOLUTION INTRAVENOUS at 04:03

## 2024-03-22 RX ADMIN — AMIODARONE HYDROCHLORIDE 400 MG: 200 TABLET ORAL at 02:03

## 2024-03-22 RX ADMIN — LABETALOL HYDROCHLORIDE 100 MG: 100 TABLET, FILM COATED ORAL at 08:03

## 2024-03-22 RX ADMIN — SEVELAMER CARBONATE 800 MG: 800 TABLET, FILM COATED ORAL at 05:03

## 2024-03-22 RX ADMIN — FINASTERIDE 5 MG: 5 TABLET, FILM COATED ORAL at 08:03

## 2024-03-22 RX ADMIN — AMLODIPINE BESYLATE 10 MG: 5 TABLET ORAL at 08:03

## 2024-03-22 RX ADMIN — CEFTRIAXONE 2 G: 2 INJECTION, POWDER, FOR SOLUTION INTRAMUSCULAR; INTRAVENOUS at 05:03

## 2024-03-22 RX ADMIN — COLCHICINE 0.6 MG: 0.6 TABLET, FILM COATED ORAL at 10:03

## 2024-03-22 RX ADMIN — MUPIROCIN: 20 OINTMENT TOPICAL at 08:03

## 2024-03-22 RX ADMIN — TAMSULOSIN HYDROCHLORIDE 0.8 MG: 0.4 CAPSULE ORAL at 08:03

## 2024-03-22 RX ADMIN — Medication 10 ML: at 05:03

## 2024-03-22 RX ADMIN — Medication 10 ML: at 09:03

## 2024-03-22 RX ADMIN — Medication 10 ML: at 02:03

## 2024-03-22 RX ADMIN — AMIODARONE HYDROCHLORIDE 1 MG/MIN: 1.8 INJECTION, SOLUTION INTRAVENOUS at 04:03

## 2024-03-22 RX ADMIN — AMIODARONE HYDROCHLORIDE 0.5 MG/MIN: 1.8 INJECTION, SOLUTION INTRAVENOUS at 10:03

## 2024-03-22 NOTE — ASSESSMENT & PLAN NOTE
Patient's anemia is currently controlled. Has not received any PRBCs to date. Etiology likely d/t chronic disease due to ESRD  Current CBC reviewed-   Lab Results   Component Value Date    HGB 10.6 (L) 03/22/2024    HCT 29.3 (L) 03/22/2024     Monitor serial CBC and transfuse if patient becomes hemodynamically unstable, symptomatic or H/H drops below 7/21.

## 2024-03-22 NOTE — NURSING
Pt heart rate remains elevate after iv stat med given per MD orders. MD notified and order given to send pt to ICU for monitoring. Report called to icu receiving nurse. Pt AAOX4 denies any pain or discomfort. Rapid nurse and charge nurse transported pt with monitor and o2 via bed.

## 2024-03-22 NOTE — NURSING
[facility]  Nurses Note -- 4 Eyes      3/22/2024       Skin assessed on: Q Shift      [x] No Pressure Injuries Present    [x]Prevention Measures Documented    [] Yes LDA  for Pressure Injury Previously documented     [] Yes New Pressure Injury Discovered   [] LDA for New Pressure Injury Added      Attending RN:  Steff Crowder, RN     Second RN:  Brandon Modi RN

## 2024-03-22 NOTE — ASSESSMENT & PLAN NOTE
Chronic. Latest blood pressure and vitals reviewed-     Temp:  [97.6 °F (36.4 °C)-99.9 °F (37.7 °C)]   Pulse:  []   Resp:  [0-41]   BP: (109-168)/(56-92)   SpO2:  [86 %-100 %] .   Home meds for hypertension were reviewed and noted below.   Hypertension Medications               amLODIPine (NORVASC) 10 MG tablet Take 1 tablet by mouth once daily    labetaloL (NORMODYNE) 100 MG tablet Take 1 tablet (100 mg total) by mouth once daily.          - BP Rx held on admit due to hypotension  - BP improved. Resumed home Rx    Will utilize p.r.n. blood pressure medication only if patient's blood pressure greater than 180/110 and he develops symptoms such as worsening chest pain or shortness of breath.

## 2024-03-22 NOTE — PROGRESS NOTES
[facility] Medicine  Progress Note    Patient Name: Miguel Moore  MRN: [id number]  Patient Class: IP- Inpatient   Admission Date: 3/20/2024  Length of Stay: 2 days  Attending Physician: Patricia Reddy MD  Primary Care Provider: Raciel Raymond MD        Subjective:     Principal Problem:Paroxysmal atrial fibrillation with RVR        HPI:  Mr Miguel Moore is a 68 y.o. man with ESRD on HD, HTN, DM with neuropathy, history of CVA with residual R sided weakness, chronic DVT on eliquis who presented with feeling poorly. He attended his usual dialysis session on Monday 3/18 without issues. He developed fatigue and R ankle swelling. He has some pain with palpation but is able to walk. No reports of trauma. No wounds. No falls. No history of gout. Today he was feeling worse so did not go to dialysis and came to the ED. No fevers. No shortness of breath. Normal appetite. No nausea, vomiting. No chest pain. No palpitations.     In the ED, afebrile with HR initially controlled then to 130s Afib RVR. Started diltiazem but became hypotensive. Diltiazem stopped. Given antibiotics. Admitted for further management.     Overview/Hospital Course:  Mr Miguel Moore is a 68 y.o. man with ESRD on HD, DM who was admitted with new onset Afib RVR, hyperkalemia from missed HD session, and R ankle cellulitis. Started amio gtt with conversion to NSR. TTE EF 55-60%, mild LAE. Cardiology consulted. Now on PO amiodarone and continues home eliquis (on this for chronic DVT). He received HD with resolution of hyperkalemia. He is on CTX for his R ankle cellulitis and was given colchicine in case gout could contribute. This is improving. He developed recurrent Afib and amio gtt resumed. Cardiology planning SONY/DCCV on 3/22.     Interval History: Developed recurrent Afib overnight. This morning he is tired. His ankle is feeling better than yesterday but he still has swelling present.     Review of Systems    Constitutional:  Positive for fatigue. Negative for chills and fever.   Respiratory:  Negative for shortness of breath.    Cardiovascular:  Negative for chest pain.   Gastrointestinal:  Negative for abdominal pain.   Musculoskeletal:  Positive for arthralgias.   Neurological:  Positive for weakness.   Psychiatric/Behavioral:  Negative for confusion.      Objective:     Vital Signs (Most Recent):  Temp: 99.4 °F (37.4 °C) (03/22/24 0701)  Pulse: (!) 118 (03/22/24 0945)  Resp: 20 (03/22/24 0945)  BP: (!) 119/58 (03/22/24 0945)  SpO2: 95 % (03/22/24 0930) Vital Signs (24h Range):  Temp:  [97.6 °F (36.4 °C)-99.9 °F (37.7 °C)] 99.4 °F (37.4 °C)  Pulse:  [] 118  Resp:  [0-41] 20  SpO2:  [86 %-100 %] 95 %  BP: (109-168)/(56-92) 119/58     Weight: 83.5 kg (184 lb 1.4 oz)  Body mass index is 27.99 kg/m².    Intake/Output Summary (Last 24 hours) at 3/22/2024 0948  Last data filed at 3/22/2024 0900  Gross per 24 hour   Intake 330.02 ml   Output --   Net 330.02 ml           Physical Exam  Vitals reviewed.   Constitutional:       General: He is not in acute distress.     Appearance: He is ill-appearing. He is not toxic-appearing.   HENT:      Head: Normocephalic and atraumatic.      Nose: Nose normal.      Mouth/Throat:      Mouth: Mucous membranes are moist.   Cardiovascular:      Rate and Rhythm: Tachycardia present. Rhythm irregular.      Pulses: Normal pulses.      Heart sounds: Normal heart sounds.      Comments: Afib HR in 130s  Pulmonary:      Effort: Pulmonary effort is normal. No respiratory distress.      Breath sounds: No wheezing, rhonchi or rales.      Comments: Room air  Abdominal:      General: Bowel sounds are normal.      Palpations: Abdomen is soft.   Musculoskeletal:         General: Swelling (R ankle) present. No tenderness (R ankle).      Right lower leg: No edema.      Left lower leg: No edema.   Skin:     Comments: LUE AVF in use for HD   Neurological:      Mental Status: He is alert. Mental status  is at baseline.      Motor: Weakness (RUE) present.             Significant Labs: All pertinent labs within the past 24 hours have been reviewed.    Significant Imaging: I have reviewed all pertinent imaging results/findings within the past 24 hours.    Assessment/Plan:      * Paroxysmal atrial fibrillation with RVR  Patient has Paroxysmal (<7 days) atrial fibrillation which is uncontrolled. Patient is currently in atrial fibrillation.  - new onset Afib  - on labetalol for BP at home, eliquis for chronic DVT but did miss some doses of eliquis  - in ED given diltiazem gtt but that caused hypotension  - EKG with Afib RVR with normal Qtc  - started amiodarone with conversion to NSR. Changed to PO amiodarone  - however, he had recurrent Afib and was placed back on amio gtt.   - Cardiology consulted  - plan for SONY/DCCV on 3/22/24  - continue home eliquis    Lab Results   Component Value Date    TSH 2.672 03/20/2024     - TTE normal EF, mild LAE    [id number] - VASc score:  Congestive Heart Failure or EF < 35% NO   Hypertension YES  +1   Age >= 75 NO   Diabetes Mellitus YES  +1   Stroke/TIA prior history YES  +2   Vascular disease (PAD, MI or Aortic Plaque)? YES  +1   Age 65 - 74 YES  +1   Female NO   Total 6         Cellulitis of right foot  R ankle pain, swelling, warmth present. Potential gout vs cellulitis. 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 No fracture present. Uric acid normal. Arterial US and venous US with no clots, appropriate flow  - continue CTX for suspected cellulitis  - colchicine x1 dose again today in case gout contributes  - blood cultures currently NGTD  - on 3/22, pain is improving, warmth and hyperpigmentation are decreasing. Still has swelling  - PT, OT consults after cardioversion      Anemia in chronic kidney disease  Patient's anemia is currently controlled. Has not received any PRBCs to date. Etiology likely d/t chronic disease due to ESRD  Current CBC reviewed-    Lab Results   Component Value Date    HGB 10.6 (L) 03/22/2024    HCT 29.3 (L) 03/22/2024     Monitor serial CBC and transfuse if patient becomes hemodynamically unstable, symptomatic or H/H drops below 7/21.    Secondary hyperparathyroidism of renal origin  Continue renvela       ESRD (end stage renal disease)  ESRD via LUE AVF. Last session 3/18/24. Missed 3/20/24 due to fatigue. Usually MWF  - hyperkalemic on admit. Does not appear volume overloaded.   - Nephrology Dr Currie consulted. Received HD on 3/20. K normalized.   - continue MWF HD    Hemiplegia and hemiparesis following cerebral infarction affecting right dominant side  No signs of acute stroke. Does have RUE weakness.   - continue home asa, eliquis, statin      Seizure disorder as sequela of cerebrovascular accident  Not currently on antiepileptics. No seizure activity reported. Monitor.       Controlled type 2 diabetes mellitus with chronic kidney disease on chronic dialysis, with long-term current use of insulin  A1c:   Lab Results   Component Value Date    HGBA1C 5.8 (H) 03/20/2024     Meds: SSI PRN to maintain goal 140-180  ADA renal diet, accuchecks, hypoglycemic protocol      Dyslipidemia  Continue statin      Benign essential HTN  Chronic. Latest blood pressure and vitals reviewed-     Temp:  [97.6 °F (36.4 °C)-99.9 °F (37.7 °C)]   Pulse:  []   Resp:  [0-41]   BP: (109-168)/(56-92)   SpO2:  [86 %-100 %] .   Home meds for hypertension were reviewed and noted below.   Hypertension Medications               amLODIPine (NORVASC) 10 MG tablet Take 1 tablet by mouth once daily    labetaloL (NORMODYNE) 100 MG tablet Take 1 tablet (100 mg total) by mouth once daily.          - BP Rx held on admit due to hypotension  - BP improved. Resumed home Rx    Will utilize p.r.n. blood pressure medication only if patient's blood pressure greater than 180/110 and he develops symptoms such as worsening chest pain or shortness of breath.      VTE Risk  Mitigation (From admission, onward)           Ordered     apixaban tablet 5 mg  2 times daily         03/20/24 1701                    Discharge Planning   GARY:      Code Status: Full Code   Is the patient medically ready for discharge?:     Reason for patient still in hospital (select all that apply): Patient trending condition  Discharge Plan A: Home with family            Critical care time spent on the evaluation and treatment of severe organ dysfunction, review of pertinent labs and imaging studies, discussions with consulting providers and discussions with patient/family: 40 minutes.      Patricia Reddy MD  Department of Hospital Medicine   [facility] [place] - Intensive Care

## 2024-03-22 NOTE — NURSING
On call notified that pt's heart rate has converted to AFIB WITH RVR with rates ranging 131-163. Awaiting any new orders.

## 2024-03-22 NOTE — SUBJECTIVE & OBJECTIVE
Interval History:  Patient went back into AFib with RVR.  Initial plan was to do HERBIE cardioversion today.  Underwent dialysis as well as was initiated on amiodarone drip.  Converted back into sinus rhythm on that.  Herbie cardioversion was canceled    Review of Systems   Constitutional: Positive for malaise/fatigue.   HENT: Negative.     Eyes: Negative.    Cardiovascular: Negative.    Respiratory: Negative.     Endocrine: Negative.    Hematologic/Lymphatic: Negative.    Skin: Negative.    Musculoskeletal: Negative.    Gastrointestinal: Negative.    Genitourinary: Negative.    Neurological: Negative.    Psychiatric/Behavioral: Negative.     Allergic/Immunologic: Negative.      Objective:     Vital Signs (Most Recent):  Temp: 98 °F (36.7 °C) (03/22/24 1230)  Pulse: 81 (03/22/24 1230)  Resp: 16 (03/22/24 1230)  BP: (!) 101/56 (03/22/24 1230)  SpO2: 100 % (03/22/24 1145) Vital Signs (24h Range):  Temp:  [97.6 °F (36.4 °C)-99.9 °F (37.7 °C)] 98 °F (36.7 °C)  Pulse:  [] 81  Resp:  [0-41] 16  SpO2:  [86 %-100 %] 100 %  BP: ()/(52-79) 101/56     Weight: 83.5 kg (184 lb 1.4 oz)  Body mass index is 27.99 kg/m².     SpO2: 100 %         Intake/Output Summary (Last 24 hours) at 3/22/2024 1406  Last data filed at 3/22/2024 1101  Gross per 24 hour   Intake 373.84 ml   Output --   Net 373.84 ml       Lines/Drains/Airways       Peripheral Intravenous Line  Duration                  Hemodialysis AV Fistula Left upper arm -- days         Peripheral IV - Single Lumen 03/20/24 1307 20 G Anterior;Proximal;Right Forearm 2 days         Peripheral IV - Single Lumen 03/20/24 1926 18 G Anterior;Right;Other (Comment) 1 day                       Physical Exam  Vitals reviewed.   Constitutional:       Appearance: He is well-developed.   HENT:      Head: Normocephalic.   Eyes:      Conjunctiva/sclera: Conjunctivae normal.      Pupils: Pupils are equal, round, and reactive to light.   Cardiovascular:      Rate and Rhythm: Normal rate and  regular rhythm.      Heart sounds: Normal heart sounds.   Pulmonary:      Effort: Pulmonary effort is normal.      Breath sounds: Normal breath sounds.   Abdominal:      General: Bowel sounds are normal.      Palpations: Abdomen is soft.   Musculoskeletal:      Cervical back: Normal range of motion and neck supple.   Skin:     General: Skin is warm.   Neurological:      Mental Status: He is alert and oriented to person, place, and time.            Significant Labs:     DATA:     Laboratory:  CBC:  Recent Labs   Lab 03/20/24  1313 03/21/24  0440 03/22/24  0424   WBC 8.12 7.58 8.29   Hemoglobin 12.5 L 11.5 L 10.6 L   Hematocrit 36.0 L 33.5 L 29.3 L   Platelets 74 L 69 L 81 L       CHEMISTRIES:  Recent Labs   Lab 07/13/21  0602 07/15/21  1055 11/06/21  1434 11/07/21  0519 03/20/24  1313 03/21/24  0440 03/22/24  0424   Glucose  --   --   --   --  103 120 H 145 H   Sodium 135 L  --  131 L 135 L 133 L 132 L 131 L   Potassium 4.1   < > 5.0 3.9 6.0 H 4.7 4.5   BUN 34.6 H  --  33.8 H 41.4 H 65 H 38 H 52 H   Creatinine 7.52 H  --  8.34 H 10.20 H 13.8 H 8.9 H 10.7 H   eGFR  8 L  --  7 L 5 L  --   --   --    Calcium 9.0  --  9.3 9.2 9.1 8.2 L 8.0 L    < > = values in this interval not displayed.       CARDIAC BIOMARKERS:  Recent Labs   Lab 03/20/24  1313   Troponin I 0.108 H       COAGS:  Recent Labs   Lab 07/13/21  0602 11/06/21  1434   INR 1.0 1.1       LIPIDS/LFTS:  Recent Labs   Lab 11/02/21  0829 11/06/21  1434 04/27/23  1000 03/20/24  1313   Cholesterol 133  --  190  --    Triglycerides 110  --  106  --    HDL 39 L  --  36 L  --    LDL Cholesterol 72.0  --  132.8  --    Non-HDL Cholesterol 94  --  154  --    AST  --  18  --  19   ALT  --  17  --  12       Hemoglobin A1C   Date Value Ref Range Status   03/20/2024 5.8 (H) 4.0 - 5.6 % Final     Comment:     ADA Screening Guidelines:  5.7-6.4%  Consistent with prediabetes  >or=6.5%  Consistent with diabetes    High levels of fetal hemoglobin interfere with the  HbA1C  assay. Heterozygous hemoglobin variants (HbS, HgC, etc)do  not significantly interfere with this assay.   However, presence of multiple variants may affect accuracy.     10/26/2023 5.9 (H) 4.0 - 5.6 % Final     Comment:     ADA Screening Guidelines:  5.7-6.4%  Consistent with prediabetes  >or=6.5%  Consistent with diabetes    High levels of fetal hemoglobin interfere with the HbA1C  assay. Heterozygous hemoglobin variants (HbS, HgC, etc)do  not significantly interfere with this assay.   However, presence of multiple variants may affect accuracy.     04/27/2023 5.9 (H) 4.0 - 5.6 % Final     Comment:     ADA Screening Guidelines:  5.7-6.4%  Consistent with prediabetes  >or=6.5%  Consistent with diabetes    High levels of fetal hemoglobin interfere with the HbA1C  assay. Heterozygous hemoglobin variants (HbS, HgC, etc)do  not significantly interfere with this assay.   However, presence of multiple variants may affect accuracy.         TSH  Recent Labs   Lab 03/20/24  1724   TSH 2.672       The 10-year ASCVD risk score (Sole KWON, et al., 2019) is: 23.5%    Values used to calculate the score:      Age: 68 years      Sex: Male      Is Non- : Yes      Diabetic: Yes      Tobacco smoker: No      Systolic Blood Pressure: 101 mmHg      Is BP treated: Yes      HDL Cholesterol: 36 mg/dL      Total Cholesterol: 190 mg/dL       BNP    Lab Results   Component Value Date/Time     (H) 03/20/2024 01:13 PM     (H) 08/15/2020 12:01 PM    BNP 14 07/25/2018 11:30 AM            ECHO    Results for orders placed during the hospital encounter of 03/20/24    Echo    Interpretation Summary    Left Ventricle: There is normal systolic function with a visually estimated ejection fraction of 55 - 60%.    Right Ventricle: Normal right ventricular cavity size. Systolic function is normal.    Left Atrium: Left atrium is mildly dilated.    Aortic Valve: There is mild aortic valve sclerosis.    IVC/SVC:  Normal venous pressure at 3 mmHg.      STRESS TEST    No results found for this or any previous visit.        CATH    Results for orders placed during the hospital encounter of 03/20/24    Intra-Procedure Documentation    Narrative  Aborted invasive cardiology procedure- see Procedure Log link for details.

## 2024-03-22 NOTE — CARE UPDATE
Patient went back into Afib w/ RVR. A dose of diltiazem 15 mg was given without improvement. Will put patient back on amiodarone infusion and transfer to ICU.

## 2024-03-22 NOTE — PLAN OF CARE
Patient goes to sinus rhythm while on hemodialysis.   Amiodarone drip removed and started on PO amiodarone.    VSS but did drop to 90 SBP during dialysis.    Tolerating diet.   Stepdown orders placed but no available bed at present

## 2024-03-22 NOTE — PROGRESS NOTES
[facility] - Intensive Care  Cardiology  Progress Note    Patient Name: Miguel Moore  MRN: [id number]  Admission Date: 3/20/2024  Hospital Length of Stay: 2 days  Code Status: Full Code   Attending Physician: Patricia Reddy MD   Primary Care Physician: Raciel Raymond MD  Expected Discharge Date:   Principal Problem:Paroxysmal atrial fibrillation with RVR    Subjective:       Interval History:  Patient went back into AFib with RVR.  Initial plan was to do HERBIE cardioversion today.  Underwent dialysis as well as was initiated on amiodarone drip.  Converted back into sinus rhythm on that.  Herbie cardioversion was canceled    Review of Systems   Constitutional: Positive for malaise/fatigue.   HENT: Negative.     Eyes: Negative.    Cardiovascular: Negative.    Respiratory: Negative.     Endocrine: Negative.    Hematologic/Lymphatic: Negative.    Skin: Negative.    Musculoskeletal: Negative.    Gastrointestinal: Negative.    Genitourinary: Negative.    Neurological: Negative.    Psychiatric/Behavioral: Negative.     Allergic/Immunologic: Negative.      Objective:     Vital Signs (Most Recent):  Temp: 98 °F (36.7 °C) (03/22/24 1230)  Pulse: 81 (03/22/24 1230)  Resp: 16 (03/22/24 1230)  BP: (!) 101/56 (03/22/24 1230)  SpO2: 100 % (03/22/24 1145) Vital Signs (24h Range):  Temp:  [97.6 °F (36.4 °C)-99.9 °F (37.7 °C)] 98 °F (36.7 °C)  Pulse:  [] 81  Resp:  [0-41] 16  SpO2:  [86 %-100 %] 100 %  BP: ()/(52-79) 101/56     Weight: 83.5 kg (184 lb 1.4 oz)  Body mass index is 27.99 kg/m².     SpO2: 100 %         Intake/Output Summary (Last 24 hours) at 3/22/2024 1406  Last data filed at 3/22/2024 1101  Gross per 24 hour   Intake 373.84 ml   Output --   Net 373.84 ml       Lines/Drains/Airways       Peripheral Intravenous Line  Duration                  Hemodialysis AV Fistula Left upper arm -- days         Peripheral IV - Single Lumen 03/20/24 1307 20 G Anterior;Proximal;Right Forearm 2 days         Peripheral IV -  Single Lumen 03/20/24 1926 18 G Anterior;Right;Other (Comment) 1 day                       Physical Exam  Vitals reviewed.   Constitutional:       Appearance: He is well-developed.   HENT:      Head: Normocephalic.   Eyes:      Conjunctiva/sclera: Conjunctivae normal.      Pupils: Pupils are equal, round, and reactive to light.   Cardiovascular:      Rate and Rhythm: Normal rate and regular rhythm.      Heart sounds: Normal heart sounds.   Pulmonary:      Effort: Pulmonary effort is normal.      Breath sounds: Normal breath sounds.   Abdominal:      General: Bowel sounds are normal.      Palpations: Abdomen is soft.   Musculoskeletal:      Cervical back: Normal range of motion and neck supple.   Skin:     General: Skin is warm.   Neurological:      Mental Status: He is alert and oriented to person, place, and time.            Significant Labs:     DATA:     Laboratory:  CBC:  Recent Labs   Lab 03/20/24  1313 03/21/24  0440 03/22/24  0424   WBC 8.12 7.58 8.29   Hemoglobin 12.5 L 11.5 L 10.6 L   Hematocrit 36.0 L 33.5 L 29.3 L   Platelets 74 L 69 L 81 L       CHEMISTRIES:  Recent Labs   Lab 07/13/21  0602 07/15/21  1055 11/06/21  1434 11/07/21  0519 03/20/24  1313 03/21/24  0440 03/22/24  0424   Glucose  --   --   --   --  103 120 H 145 H   Sodium 135 L  --  131 L 135 L 133 L 132 L 131 L   Potassium 4.1   < > 5.0 3.9 6.0 H 4.7 4.5   BUN 34.6 H  --  33.8 H 41.4 H 65 H 38 H 52 H   Creatinine 7.52 H  --  8.34 H 10.20 H 13.8 H 8.9 H 10.7 H   eGFR  8 L  --  7 L 5 L  --   --   --    Calcium 9.0  --  9.3 9.2 9.1 8.2 L 8.0 L    < > = values in this interval not displayed.       CARDIAC BIOMARKERS:  Recent Labs   Lab 03/20/24  1313   Troponin I 0.108 H       COAGS:  Recent Labs   Lab 07/13/21  0602 11/06/21  1434   INR 1.0 1.1       LIPIDS/LFTS:  Recent Labs   Lab 11/02/21  0829 11/06/21  1434 04/27/23  1000 03/20/24  1313   Cholesterol 133  --  190  --    Triglycerides 110  --  106  --    HDL 39 L  --  36 L   --    LDL Cholesterol 72.0  --  132.8  --    Non-HDL Cholesterol 94  --  154  --    AST  --  18  --  19   ALT  --  17  --  12       Hemoglobin A1C   Date Value Ref Range Status   03/20/2024 5.8 (H) 4.0 - 5.6 % Final     Comment:     ADA Screening Guidelines:  5.7-6.4%  Consistent with prediabetes  >or=6.5%  Consistent with diabetes    High levels of fetal hemoglobin interfere with the HbA1C  assay. Heterozygous hemoglobin variants (HbS, HgC, etc)do  not significantly interfere with this assay.   However, presence of multiple variants may affect accuracy.     10/26/2023 5.9 (H) 4.0 - 5.6 % Final     Comment:     ADA Screening Guidelines:  5.7-6.4%  Consistent with prediabetes  >or=6.5%  Consistent with diabetes    High levels of fetal hemoglobin interfere with the HbA1C  assay. Heterozygous hemoglobin variants (HbS, HgC, etc)do  not significantly interfere with this assay.   However, presence of multiple variants may affect accuracy.     04/27/2023 5.9 (H) 4.0 - 5.6 % Final     Comment:     ADA Screening Guidelines:  5.7-6.4%  Consistent with prediabetes  >or=6.5%  Consistent with diabetes    High levels of fetal hemoglobin interfere with the HbA1C  assay. Heterozygous hemoglobin variants (HbS, HgC, etc)do  not significantly interfere with this assay.   However, presence of multiple variants may affect accuracy.         TSH  Recent Labs   Lab 03/20/24  1724   TSH 2.672       The 10-year ASCVD risk score (Sole DK, et al., 2019) is: 23.5%    Values used to calculate the score:      Age: 68 years      Sex: Male      Is Non- : Yes      Diabetic: Yes      Tobacco smoker: No      Systolic Blood Pressure: 101 mmHg      Is BP treated: Yes      HDL Cholesterol: 36 mg/dL      Total Cholesterol: 190 mg/dL       BNP    Lab Results   Component Value Date/Time     (H) 03/20/2024 01:13 PM     (H) 08/15/2020 12:01 PM    BNP 14 07/25/2018 11:30 AM            ECHO    Results for orders placed  during the hospital encounter of 03/20/24    Echo    Interpretation Summary    Left Ventricle: There is normal systolic function with a visually estimated ejection fraction of 55 - 60%.    Right Ventricle: Normal right ventricular cavity size. Systolic function is normal.    Left Atrium: Left atrium is mildly dilated.    Aortic Valve: There is mild aortic valve sclerosis.    IVC/SVC: Normal venous pressure at 3 mmHg.      STRESS TEST    No results found for this or any previous visit.        CATH    Results for orders placed during the hospital encounter of 03/20/24    Intra-Procedure Documentation    Narrative  Aborted invasive cardiology procedure- see Procedure Log link for details.      Assessment and Plan:     Brief HPI:     * Paroxysmal atrial fibrillation with RVR  A-fib converted to NSR after IV amiodarone. Change to po.  Continue anticoagulation.    ESRD (end stage renal disease)  Per renal    Hemiplegia and hemiparesis following cerebral infarction affecting right dominant side  Stable    Controlled type 2 diabetes mellitus with chronic kidney disease on chronic dialysis, with long-term current use of insulin  Per primary    Dyslipidemia  On statin        VTE Risk Mitigation (From admission, onward)           Ordered     apixaban tablet 5 mg  2 times daily         03/20/24 5736                    Ludy Munoz MD  Cardiology  [facility] - [place], [place] - Intensive Care      Critical Care Time:  35 minutes     Critical care was time spent personally by me on the following activities: development of treatment plan with patient or surrogate and bedside caregivers, discussions with consultants, evaluation of patient's response to treatment, examination of patient, ordering and performing treatments and interventions, ordering and review of laboratory studies, ordering and review of radiographic studies, pulse oximetry, re-evaluation of patient's condition. This critical care time did not overlap with that of any other  provider or involve time for any procedures.

## 2024-03-22 NOTE — SUBJECTIVE & OBJECTIVE
Interval History: Developed recurrent Afib overnight. This morning he is tired. His ankle is feeling better than yesterday but he still has swelling present.     Review of Systems   Constitutional:  Positive for fatigue. Negative for chills and fever.   Respiratory:  Negative for shortness of breath.    Cardiovascular:  Negative for chest pain.   Gastrointestinal:  Negative for abdominal pain.   Musculoskeletal:  Positive for arthralgias.   Neurological:  Positive for weakness.   Psychiatric/Behavioral:  Negative for confusion.      Objective:     Vital Signs (Most Recent):  Temp: 99.4 °F (37.4 °C) (03/22/24 0701)  Pulse: (!) 118 (03/22/24 0945)  Resp: 20 (03/22/24 0945)  BP: (!) 119/58 (03/22/24 0945)  SpO2: 95 % (03/22/24 0930) Vital Signs (24h Range):  Temp:  [97.6 °F (36.4 °C)-99.9 °F (37.7 °C)] 99.4 °F (37.4 °C)  Pulse:  [] 118  Resp:  [0-41] 20  SpO2:  [86 %-100 %] 95 %  BP: (109-168)/(56-92) 119/58     Weight: 83.5 kg (184 lb 1.4 oz)  Body mass index is 27.99 kg/m².    Intake/Output Summary (Last 24 hours) at 3/22/2024 0948  Last data filed at 3/22/2024 0900  Gross per 24 hour   Intake 330.02 ml   Output --   Net 330.02 ml           Physical Exam  Vitals reviewed.   Constitutional:       General: He is not in acute distress.     Appearance: He is ill-appearing. He is not toxic-appearing.   HENT:      Head: Normocephalic and atraumatic.      Nose: Nose normal.      Mouth/Throat:      Mouth: Mucous membranes are moist.   Cardiovascular:      Rate and Rhythm: Tachycardia present. Rhythm irregular.      Pulses: Normal pulses.      Heart sounds: Normal heart sounds.      Comments: Afib HR in 130s  Pulmonary:      Effort: Pulmonary effort is normal. No respiratory distress.      Breath sounds: No wheezing, rhonchi or rales.      Comments: Room air  Abdominal:      General: Bowel sounds are normal.      Palpations: Abdomen is soft.   Musculoskeletal:         General: Swelling (R ankle) present. No tenderness  (R ankle).      Right lower leg: No edema.      Left lower leg: No edema.   Skin:     Comments: CAITLIN AVF in use for HD   Neurological:      Mental Status: He is alert. Mental status is at baseline.      Motor: Weakness (RUE) present.             Significant Labs: All pertinent labs within the past 24 hours have been reviewed.    Significant Imaging: I have reviewed all pertinent imaging results/findings within the past 24 hours.

## 2024-03-22 NOTE — ASSESSMENT & PLAN NOTE
Patient has Paroxysmal (<7 days) atrial fibrillation which is uncontrolled. Patient is currently in atrial fibrillation.  - new onset Afib  - on labetalol for BP at home, eliquis for chronic DVT but did miss some doses of eliquis  - in ED given diltiazem gtt but that caused hypotension  - EKG with Afib RVR with normal Qtc  - started amiodarone with conversion to NSR. Changed to PO amiodarone  - however, he had recurrent Afib and was placed back on amio gtt.   - Cardiology consulted  - plan for SONY/DCCV on 3/22/24  - continue home eliquis    Lab Results   Component Value Date    TSH 2.672 03/20/2024     - TTE normal EF, mild LAE    [id number] - VASc score:  Congestive Heart Failure or EF < 35% NO   Hypertension YES  +1   Age >= 75 NO   Diabetes Mellitus YES  +1   Stroke/TIA prior history YES  +2   Vascular disease (PAD, MI or Aortic Plaque)? YES  +1   Age 65 - 74 YES  +1   Female NO   Total 6

## 2024-03-22 NOTE — ASSESSMENT & PLAN NOTE
A1c:   Lab Results   Component Value Date    HGBA1C 5.8 (H) 03/20/2024     Meds: SSI PRN to maintain goal 140-180  ADA renal diet, accuchecks, hypoglycemic protocol

## 2024-03-22 NOTE — PROVIDER TRANSFER
Mr Miguel Moore is a 68 y.o. man with ESRD on HD, DM who was admitted with new onset Afib RVR, hyperkalemia from missed HD session, and R ankle cellulitis. Started amio gtt with conversion to NSR. TTE EF 55-60%, mild LAE. Cardiology consulted. Now on PO amiodarone and continues home eliquis (on this for chronic DVT). He received HD with resolution of hyperkalemia. He is on CTX for his R ankle cellulitis and was given colchicine in case gout could contribute. This is improving. He developed recurrent Afib and amio gtt resumed. Converted back to NSR, now on PO amio, and stepped down to floor.     To do  - monitor HR/rhythm  - needs PO amio Rx upon discharge  - monitor R ankle  - PT, OT consulted     Patricia Reddy MD  03/22/2024 2:44 PM

## 2024-03-22 NOTE — EICU
EICU BRIEF ADMIT NOTE:    Reason for ICU admission:  Atrial fibrillation with rapid ventricular rate    Please refer to admission H&P for details.     Comfortably lying in the bed.  Not in distress.    Chart reviewed: yes  Recent MD notes reviewed: Yes  Labs results reviewed: yes  Radiology: Chest images reviewed. Reports for others reviewed.  Telemetry tracing: yes  Evaluation via interactive audio and video telecommunications: yes comfortable and not distress.  Communicated issues/orders/plan with: bedside ICU RN    Best Practices Review:  Stress ulcer prophylaxis:not indicated  DVT prophylaxis: Systemic AC in effect.   Blood glucose monitoring: Ordered        Ventilator review:  Intubated : No      Assessment & Plan:     Impression:  AFib with RVR  Right ankle cellulitis  End-stage renal disease on dialysis  History of diabetes mellitus  Hyponatremia  Anemia  Thrombocytopenia      Plan:  Continue amiodarone.  Cardiology is following.  Continue Eliquis.  Follow for any signs of bleeding.  Nephrology for continuation of dialysis.  Continue rest of supportive care.      Thank you for allowing the EICU to participate in your patient's care. Please feel free to call us as needed.     I have encourage bedside RN to call me with the results of labs/imaging if ordered.         Vania Moon MD  [facility]  [phone]

## 2024-03-22 NOTE — NURSING
RAPID RESPONSE NURSE PROACTIVE ROUNDING NOTE       Time of Visit: 405    Admit Date: 3/20/2024  LOS: 2  Code Status: Full Code   Date of Visit: 2024  : 1955  Age: 68 y.o.  Sex: male  Race: Black or   Bed: W279/W279 A:   MRN: [id number]  Was the patient discharged from an ICU this admission? Yes   Was the patient discharged from a PACU within last 24 hours? No   Did the patient receive conscious sedation/general anesthesia in last 24 hours? No   Was the patient in the ED within the past 24 hours? No   Was the patient on NIPPV within the past 24 hours? No   Attending Physician: Patricia Reddy MD  Primary Service: Hospitalist   Time spent at the bedside: 30 - 45 min    SITUATION    Notified by bedside RN via phone call  Reason for alert: back in afib with RVR    Diagnosis: Paroxysmal atrial fibrillation with RVR   has a past medical history of Allergy, Clotting disorder, Deep vein thrombosis, Diabetes mellitus, type 2, Hypertension, Nuclear sclerosis of both eyes, Renal disorder, Seizures, Stroke, and Urinary tract infection.    Last Vitals:  Temp: 98.8 °F (37.1 °C) (5)  Pulse: 126 (615)  Resp: 14 (615)  BP: 122/56 (615)  SpO2: 99 % (615)    24 Hour Vitals Range:  Temp:  [97.6 °F (36.4 °C)-99.9 °F (37.7 °C)]   Pulse:  []   Resp:  [0-41]   BP: (109-168)/(56-92)   SpO2:  [87 %-100 %]     Clinical Issues: Circulatory    ASSESSMENT/INTERVENTIONS  Pt back in afib rvr from previous NSR. HR 130s-170s on cardiac monitor. 12 lead done prior to my notification and 15 mg diltiazem push was given. Minimal improvement. MD Abraham notified and orders placed for amio bolus and gtt and transfer to ICU. Pt was AAOx4 and denied any SOB, CP, dizzyness.     RECOMMENDATIONS  See above    Discussed plan of care with charge RNAsha and bedside RN Nata    PROVIDER ESCALATION    Physician escalation: Yes    Orders received and case discussed with Dr. Abraham  .    Disposition:Tx in ICU bed 279    FOLLOW UP    Call back the Rapid Response Nurse[facility]ita at [phone] for additional questions or concerns.

## 2024-03-22 NOTE — ASSESSMENT & PLAN NOTE
R ankle pain, swelling, warmth present. Potential gout vs cellulitis. 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 No fracture present. Uric acid normal. Arterial US and venous US with no clots, appropriate flow  - continue CTX for suspected cellulitis  - colchicine x1 dose again today in case gout contributes  - blood cultures currently NGTD  - on 3/22, pain is improving, warmth and hyperpigmentation are decreasing. Still has swelling  - PT, OT consults after cardioversion

## 2024-03-22 NOTE — PROGRESS NOTES
68 y o m with hx of esrd htn anemia 2nd hyperpth admitted with R anklle and leg swelling x 2 days. Also with bandemia and hyperk, Renal consult was requested to help with HD as his HD schedule is MW.. Last pm he was dialyzed without issues. This am he is feeling better.    Denies injuries to his R foot or leg denies hx of gout.     Denies CNS ENT CP GI  OR DERM SX  Past Medical History:   Diagnosis Date    Allergy     Clotting disorder     Elaquis and APlavix    Deep vein thrombosis     Diabetes mellitus, type 2     Hypertension     Nuclear sclerosis of both eyes 6/9/2017    Renal disorder     Seizures     Stroke     Urinary tract infection      Past Surgical History:   Procedure Laterality Date    CATARACT EXTRACTION W/  INTRAOCULAR LENS IMPLANT Right 10/05/2017    Dr. Tay    CATARACT EXTRACTION W/  INTRAOCULAR LENS IMPLANT Left 10/19/2017    Dr. Tay    CYSTOSCOPY W/ RETROGRADES Bilateral 2/18/2021    Procedure: CYSTOSCOPY, WITH RETROGRADE PYELOGRAM;  Surgeon: JEMMA Styles MD;  Location: [facility] OR;  Service: Urology;  Laterality: Bilateral;  REQUESTING EARLY AS POSSIBE-LO / 2/8/2021 @ 1:13PM  RN Pre Op 2-11-21, Covid NEGATIVE ON  2-17-21.  C A    ESOPHAGOGASTRODUODENOSCOPY N/A 8/17/2020    Procedure: EGD (ESOPHAGOGASTRODUODENOSCOPY);  Surgeon: Desmond Chapman MD;  Location: [facility] ENDO;  Service: Endoscopy;  Laterality: N/A;    EYE SURGERY Bilateral     cataract    FEMORAL ARTERY STENT      FRACTURE SURGERY      KNEE SURGERY      bilateral scope     Social History     Socioeconomic History    Marital status: Single   Occupational History    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, etc   Tobacco Use    Smoking status: Never    Smokeless tobacco: Never   Substance and Sexual Activity    Alcohol use: No    Drug use: No   Social History Narrative    Lives with daughter     Family History   Problem Relation Age of Onset    Diabetes Mother     Heart disease Mother     Hypertension  Mother     Cataracts Mother     No Known Problems Father     Hypertension Sister     No Known Problems Brother     No Known Problems Maternal Aunt     No Known Problems Maternal Uncle     No Known Problems Paternal Aunt     No Known Problems Paternal Uncle     No Known Problems Maternal Grandmother     No Known Problems Maternal Grandfather     No Known Problems Paternal Grandmother     No Known Problems Paternal Grandfather     No Known Problems Daughter     No Known Problems Other     Amblyopia Neg Hx     Blindness Neg Hx     Cancer Neg Hx     Glaucoma Neg Hx     Macular degeneration Neg Hx     Retinal detachment Neg Hx     Strabismus Neg Hx     Stroke Neg Hx     Thyroid disease Neg Hx        Review of patient's allergies indicates:   Allergen Reactions    Ace inhibitors      Hyperkalemia 8/2018  Other reaction(s): Unknown    Penicillins Hives    Tizanidine      Felt hot       Current Facility-Administered Medications   Medication    acetaminophen tablet 650 mg    amiodarone 360 mg/200 mL (1.8 mg/mL) infusion    amiodarone 360 mg/200 mL (1.8 mg/mL) infusion    amLODIPine tablet 10 mg    apixaban tablet 5 mg    atorvastatin tablet 80 mg    cefTRIAXone (ROCEPHIN) 2 g in dextrose 5 % in water (D5W) 100 mL IVPB (MB+)    dextrose 10% bolus 125 mL 125 mL    dextrose 10% bolus 250 mL 250 mL    finasteride tablet 5 mg    glucagon (human recombinant) injection 1 mg    glucose chewable tablet 16 g    glucose chewable tablet 24 g    insulin aspart U-100 pen 0-5 Units    labetaloL tablet 100 mg    melatonin tablet 6 mg    mupirocin 2 % ointment    naloxone 0.4 mg/mL injection 0.02 mg    ondansetron injection 4 mg    prochlorperazine injection Soln 5 mg    senna-docusate 8.6-50 mg per tablet 1 tablet    sevelamer carbonate tablet 800 mg    sodium chloride 0.9% bolus 250 mL 250 mL    sodium chloride 0.9% flush 10 mL    tamsulosin 24 hr capsule 0.8 mg       LABS    Recent Results (from the past 24 hour(s))   Echo    Collection  Time: 03/21/24 10:53 AM   Result Value Ref Range    BSA 1.96 m2    LA WIDTH 4.4 cm    RA Width 3.5 cm    LVOT stroke volume 106.21 cm3    LVIDd 5.02 3.5 - 6.0 cm    LV Systolic Volume 53.98 mL    LV Systolic Volume Index 27.8 mL/m2    LVIDs 3.59 2.1 - 4.0 cm    LV Diastolic Volume 119.12 mL    LV Diastolic Volume Index 61.40 mL/m2    IVS 0.80 0.6 - 1.1 cm    LVOT diameter 2.29 cm    LVOT area 4.1 cm2    FS 28 28 - 44 %    Left Ventricle Relative Wall Thickness 0.32 cm    Posterior Wall 0.80 0.6 - 1.1 cm    LV mass 136.72 g    LV Mass Index 70 g/m2    MV Peak E Shravan 0.77 m/s    TDI LATERAL 0.16 m/s    TDI SEPTAL 0.10 m/s    E/E' ratio 5.92 m/s    MV Peak A Shravan 1.28 m/s    E/A ratio 0.60     E wave deceleration time 150.73 msec    LV SEPTAL E/E' RATIO 7.70 m/s    LA Volume Index 36.9 mL/m2    LV LATERAL E/E' RATIO 4.81 m/s    LA volume 71.56 cm3    LVOT peak shravan 1.31 m/s    Left Ventricular Outflow Tract Mean Velocity 0.87 cm/s    Left Ventricular Outflow Tract Mean Gradient 3.43 mmHg    RVDD 3.82 cm    RV S' 10.22 cm/s    TAPSE 2.33 cm    LA size 3.57 cm    Left Atrium Minor Axis 5.19 cm    Left Atrium Major Axis 5.54 cm    RA Major Axis 4.44 cm    AV mean gradient 6 mmHg    AV peak gradient 12 mmHg    Ao peak shravan 1.71 m/s    Ao VTI 32.10 cm    LVOT peak VTI 25.80 cm    AV valve area 3.31 cm²    AV Velocity Ratio 0.77     AV index (prosthetic) 0.80     GENO by Velocity Ratio 3.15 cm²    MV stenosis pressure 1/2 time 43.71 ms    MV valve area p 1/2 method 5.03 cm2    Sinus 3.36 cm    STJ 2.89 cm    IVC diameter 1.45 cm    Mean e' 0.13 m/s    ZLVIDS 0.45     ZLVIDD -0.93     Est. RA pres 3 mmHg   POCT glucose    Collection Time: 03/21/24 11:09 AM   Result Value Ref Range    POCT Glucose 122 (H) 70 - 110 mg/dL   POCT glucose    Collection Time: 03/21/24  4:44 PM   Result Value Ref Range    POCT Glucose 128 (H) 70 - 110 mg/dL   POCT glucose    Collection Time: 03/21/24  8:23 PM   Result Value Ref Range    POCT Glucose 157  (H) 70 - 110 mg/dL   POCT glucose    Collection Time: 03/21/24  9:14 PM   Result Value Ref Range    POCT Glucose 169 (H) 70 - 110 mg/dL   Basic Metabolic Panel (BMP)    Collection Time: 03/22/24  4:24 AM   Result Value Ref Range    Sodium 131 (L) 136 - 145 mmol/L    Potassium 4.5 3.5 - 5.1 mmol/L    Chloride 94 (L) 95 - 110 mmol/L    CO2 26 23 - 29 mmol/L    Glucose 145 (H) 70 - 110 mg/dL    BUN 52 (H) 8 - 23 mg/dL    Creatinine 10.7 (H) 0.5 - 1.4 mg/dL    Calcium 8.0 (L) 8.7 - 10.5 mg/dL    Anion Gap 11 8 - 16 mmol/L    eGFR 5 (A) >60 mL/min/1.73 m^2   CBC with Automated Differential    Collection Time: 03/22/24  4:24 AM   Result Value Ref Range    WBC 8.29 3.90 - 12.70 K/uL    RBC 3.96 (L) 4.60 - 6.20 M/uL    Hemoglobin 10.6 (L) 14.0 - 18.0 g/dL    Hematocrit 29.3 (L) 40.0 - 54.0 %    MCV 74 (L) 82 - 98 fL    MCH 26.8 (L) 27.0 - 31.0 pg    MCHC 36.2 (H) 32.0 - 36.0 g/dL    RDW 13.7 11.5 - 14.5 %    Platelets 81 (L) 150 - 450 K/uL    MPV 10.9 9.2 - 12.9 fL    Immature Granulocytes 4.0 (H) 0.0 - 0.5 %    Gran # (ANC) 6.6 1.8 - 7.7 K/uL    Immature Grans (Abs) 0.33 (H) 0.00 - 0.04 K/uL    Lymph # 0.4 (L) 1.0 - 4.8 K/uL    Mono # 1.0 0.3 - 1.0 K/uL    Eos # 0.0 0.0 - 0.5 K/uL    Baso # 0.01 0.00 - 0.20 K/uL    nRBC 0 0 /100 WBC    Gran % 79.1 (H) 38.0 - 73.0 %    Lymph % 5.2 (L) 18.0 - 48.0 %    Mono % 11.6 4.0 - 15.0 %    Eosinophil % 0.0 0.0 - 8.0 %    Basophil % 0.1 0.0 - 1.9 %    Differential Method Automated    Phosphorus    Collection Time: 03/22/24  4:24 AM   Result Value Ref Range    Phosphorus 3.1 2.7 - 4.5 mg/dL   POCT glucose    Collection Time: 03/22/24  7:25 AM   Result Value Ref Range    POCT Glucose 193 (H) 70 - 110 mg/dL   ]    I/O last 3 completed shifts:  In: 1275.8 [I.V.:351.7; Other:500; IV Piggyback:424.1]  Out: 1700 [Other:1700]    Vitals:    03/22/24 0800 03/22/24 0815 03/22/24 0830 03/22/24 0845   BP: 128/69 116/71 119/67 119/68   Pulse: (!) 138 (!) 138 (!) 138 (!) 139   Resp: 20 12 19 (!) 25    Temp:       TempSrc:       SpO2: 98% (!) 86% 96% (!) 92%   Weight:       Height:           No Jvd, Thyromegaly or Lymphadenopathy  Lungs: Fairly clear anteriorly and laterally  Cor: RRR no G or rubs  Abd: Soft benign good bowel sounds non tender  Ext: R foot edematous and reddish sore ankle L leg no edema     A)    ESRD MWF   Htn  Anemia  2nd hypth  Dm  Cellulitis R foot and lower leg  L shift   ? Gout (uric acid 5)  Hypoalb   Hx of high PSA     P)    HD MWF  Renal Diet  Home meds  Protect access  EPO prn (iron prn )  Binders  Adjust all meds to the degree of renal fx  Close follow up I/O and weights  Maintain Hydration   Antibiotx as needed

## 2024-03-22 NOTE — NURSING
[facility]  Nurses Note -- 4 Eyes      3/22/2024       Skin assessed on: Admit      [x] No Pressure Injuries Present    [x]Prevention Measures Documented    [] Yes LDA  for Pressure Injury Previously documented     [] Yes New Pressure Injury Discovered   [] LDA for New Pressure Injury Added      Attending RN:  Nirali Modi RN     Second RN:  Teresa RN

## 2024-03-22 NOTE — NURSING TRANSFER
Nursing Transfer Note      3/21/2024   9:16 PM    Nurse giving handoff:Marian  Nurse receiving handoff:Tana    Reason patient is being transferred: Patient requiring less critical care.    Transfer To: [id number]    Transfer via bed    Transfer with cardiac monitoring    Transported by ICU RN and Transportation    Transfer Vital Signs:  Blood Pressure:137/65  Heart Rate:98  O2:95  Temperature:99.3 oral  Respirations:20    Telemetry: Box Number [id number]  Order for Tele Monitor? Yes    Medicines sent: N/A    Any special needs or follow-up needed: None    Patient belongings transferred with patient: Yes    Chart send with patient: Yes    Notified: daughter    Patient reassessed at: 03/21/2024, 2112     Upon arrival to floor: cardiac monitor applied, patient oriented to room, call bell in reach, and bed in lowest position with patient belongings in bed including cell phone, cell phone , and headphones.

## 2024-03-22 NOTE — NURSING
Pt admitted from Tele. -160s. Amio bolus given, Amio drip started. Room air. Condom cath in place. No reports of chest pain, SOB, or dizziness. Plan of care reviewed.

## 2024-03-23 LAB
POCT GLUCOSE: 143 MG/DL (ref 70–110)
POCT GLUCOSE: 175 MG/DL (ref 70–110)
POCT GLUCOSE: 187 MG/DL (ref 70–110)
POCT GLUCOSE: 188 MG/DL (ref 70–110)
VANCOMYCIN SERPL-MCNC: 5.7 UG/ML

## 2024-03-23 PROCEDURE — 21400001 HC TELEMETRY ROOM: Mod: HCNC

## 2024-03-23 PROCEDURE — 97162 PT EVAL MOD COMPLEX 30 MIN: CPT | Mod: HCNC | Performed by: PHYSICAL THERAPIST

## 2024-03-23 PROCEDURE — 63600175 PHARM REV CODE 636 W HCPCS: Mod: HCNC | Performed by: HOSPITALIST

## 2024-03-23 PROCEDURE — 80202 ASSAY OF VANCOMYCIN: CPT | Mod: HCNC | Performed by: HOSPITALIST

## 2024-03-23 PROCEDURE — 99291 CRITICAL CARE FIRST HOUR: CPT | Mod: HCNC,,, | Performed by: INTERNAL MEDICINE

## 2024-03-23 PROCEDURE — 25000003 PHARM REV CODE 250: Mod: HCNC | Performed by: HOSPITALIST

## 2024-03-23 PROCEDURE — 36415 COLL VENOUS BLD VENIPUNCTURE: CPT | Mod: HCNC,XB | Performed by: HOSPITALIST

## 2024-03-23 PROCEDURE — 20000000 HC ICU ROOM: Mod: HCNC

## 2024-03-23 PROCEDURE — 97165 OT EVAL LOW COMPLEX 30 MIN: CPT | Mod: HCNC

## 2024-03-23 PROCEDURE — 97530 THERAPEUTIC ACTIVITIES: CPT | Mod: HCNC

## 2024-03-23 PROCEDURE — 25000003 PHARM REV CODE 250: Mod: HCNC | Performed by: INTERNAL MEDICINE

## 2024-03-23 PROCEDURE — A4216 STERILE WATER/SALINE, 10 ML: HCPCS | Mod: HCNC | Performed by: HOSPITALIST

## 2024-03-23 PROCEDURE — 63600175 PHARM REV CODE 636 W HCPCS: Mod: HCNC | Performed by: STUDENT IN AN ORGANIZED HEALTH CARE EDUCATION/TRAINING PROGRAM

## 2024-03-23 PROCEDURE — 87040 BLOOD CULTURE FOR BACTERIA: CPT | Mod: HCNC | Performed by: HOSPITALIST

## 2024-03-23 RX ORDER — AMIODARONE HYDROCHLORIDE 200 MG/1
400 TABLET ORAL 2 TIMES DAILY
Status: DISCONTINUED | OUTPATIENT
Start: 2024-03-23 | End: 2024-04-16

## 2024-03-23 RX ORDER — ACETAMINOPHEN 325 MG/1
650 TABLET ORAL EVERY 6 HOURS PRN
Status: DISCONTINUED | OUTPATIENT
Start: 2024-03-23 | End: 2024-04-08

## 2024-03-23 RX ADMIN — ATORVASTATIN CALCIUM 80 MG: 40 TABLET, FILM COATED ORAL at 08:03

## 2024-03-23 RX ADMIN — AMIODARONE HYDROCHLORIDE 400 MG: 200 TABLET ORAL at 10:03

## 2024-03-23 RX ADMIN — ACETAMINOPHEN 650 MG: 325 TABLET ORAL at 03:03

## 2024-03-23 RX ADMIN — VANCOMYCIN HYDROCHLORIDE 1000 MG: 1 INJECTION, POWDER, LYOPHILIZED, FOR SOLUTION INTRAVENOUS at 01:03

## 2024-03-23 RX ADMIN — APIXABAN 5 MG: 5 TABLET, FILM COATED ORAL at 08:03

## 2024-03-23 RX ADMIN — MUPIROCIN: 20 OINTMENT TOPICAL at 08:03

## 2024-03-23 RX ADMIN — NEPHROCAP 1 CAPSULE: 1 CAP ORAL at 01:03

## 2024-03-23 RX ADMIN — MUPIROCIN: 20 OINTMENT TOPICAL at 09:03

## 2024-03-23 RX ADMIN — CEFTRIAXONE 2 G: 2 INJECTION, POWDER, FOR SOLUTION INTRAMUSCULAR; INTRAVENOUS at 06:03

## 2024-03-23 RX ADMIN — AMIODARONE HYDROCHLORIDE 1 MG/MIN: 1.8 INJECTION, SOLUTION INTRAVENOUS at 03:03

## 2024-03-23 RX ADMIN — AMIODARONE HYDROCHLORIDE 400 MG: 200 TABLET ORAL at 09:03

## 2024-03-23 RX ADMIN — LABETALOL HYDROCHLORIDE 100 MG: 100 TABLET, FILM COATED ORAL at 08:03

## 2024-03-23 RX ADMIN — Medication 10 ML: at 09:03

## 2024-03-23 RX ADMIN — SEVELAMER CARBONATE 800 MG: 800 TABLET, FILM COATED ORAL at 04:03

## 2024-03-23 RX ADMIN — SEVELAMER CARBONATE 800 MG: 800 TABLET, FILM COATED ORAL at 07:03

## 2024-03-23 RX ADMIN — SEVELAMER CARBONATE 800 MG: 800 TABLET, FILM COATED ORAL at 11:03

## 2024-03-23 RX ADMIN — TAMSULOSIN HYDROCHLORIDE 0.8 MG: 0.4 CAPSULE ORAL at 08:03

## 2024-03-23 RX ADMIN — FINASTERIDE 5 MG: 5 TABLET, FILM COATED ORAL at 08:03

## 2024-03-23 RX ADMIN — AMLODIPINE BESYLATE 10 MG: 5 TABLET ORAL at 08:03

## 2024-03-23 RX ADMIN — Medication 10 ML: at 01:03

## 2024-03-23 RX ADMIN — AMIODARONE HYDROCHLORIDE 0.5 MG/MIN: 1.8 INJECTION, SOLUTION INTRAVENOUS at 03:03

## 2024-03-23 RX ADMIN — DOCUSATE SODIUM AND SENNOSIDES 1 TABLET: 8.6; 5 TABLET ORAL at 08:03

## 2024-03-23 RX ADMIN — Medication 10 ML: at 07:03

## 2024-03-23 RX ADMIN — APIXABAN 5 MG: 5 TABLET, FILM COATED ORAL at 09:03

## 2024-03-23 RX ADMIN — DOCUSATE SODIUM AND SENNOSIDES 1 TABLET: 8.6; 5 TABLET ORAL at 09:03

## 2024-03-23 NOTE — NURSING
[facility]  Nurses Note -- 4 Eyes      3/22/2024       Skin assessed on: Q Shift      [x] No Pressure Injuries Present    [x]Prevention Measures Documented    [] Yes LDA  for Pressure Injury Previously documented     [] Yes New Pressure Injury Discovered   [] LDA for New Pressure Injury Added      Attending RN:  Nirali Modi RN     Second RN:  DARRION Artis

## 2024-03-23 NOTE — ASSESSMENT & PLAN NOTE
Patient has Paroxysmal (<7 days) atrial fibrillation which is uncontrolled. Patient is currently in atrial fibrillation.  - new onset Afib  - on labetalol for BP at home, eliquis for chronic DVT but did miss some doses of eliquis  - in ED given diltiazem gtt but that caused hypotension  - EKG with Afib RVR with normal Qtc  - started amiodarone with conversion to NSR. Changed to PO amiodarone  - however, he had recurrent Afib and was placed back on amio gtt.   - Cardiology consulted  - plan for SONY/DCCV on 3/22/24  - continue home eliquis    Lab Results   Component Value Date    TSH 2.672 03/20/2024     - TTE normal EF, mild LAE    [id number] - VASc score:  Congestive Heart Failure or EF < 35% NO   Hypertension YES  +1   Age >= 75 NO   Diabetes Mellitus YES  +1   Stroke/TIA prior history YES  +2   Vascular disease (PAD, MI or Aortic Plaque)? YES  +1   Age 65 - 74 YES  +1   Female NO   Total 6   Sinus at this time.

## 2024-03-23 NOTE — PROGRESS NOTES
[facility]  Progress Note    Patient Name: Miguel Moore  MRN: [id number]  Patient Class: IP- Inpatient   Admission Date: 3/20/2024  Length of Stay: 3 days  Attending Physician: Braden Heredia, *  Primary Care Provider: Raciel Raymond MD        Subjective:     Principal Problem:Paroxysmal atrial fibrillation with RVR        HPI:  Mr Miguel Moore is a 68 y.o. man with ESRD on HD, HTN, DM with neuropathy, history of CVA with residual R sided weakness, chronic DVT on eliquis who presented with feeling poorly. He attended his usual dialysis session on Monday 3/18 without issues. He developed fatigue and R ankle swelling. He has some pain with palpation but is able to walk. No reports of trauma. No wounds. No falls. No history of gout. Today he was feeling worse so did not go to dialysis and came to the ED. No fevers. No shortness of breath. Normal appetite. No nausea, vomiting. No chest pain. No palpitations.     In the ED, afebrile with HR initially controlled then to 130s Afib RVR. Started diltiazem but became hypotensive. Diltiazem stopped. Given antibiotics. Admitted for further management.     Overview/Hospital Course:  Mr Miguel Moore is a 68 y.o. man with ESRD on HD, DM who was admitted with new onset Afib RVR, hyperkalemia from missed HD session, and R ankle cellulitis. Started amio gtt with conversion to NSR. TTE EF 55-60%, mild LAE. Cardiology consulted. Now on PO amiodarone and continues home eliquis (on this for chronic DVT). He received HD with resolution of hyperkalemia. He is on CTX for his R ankle cellulitis and was given colchicine in case gout could contribute. This is improving. He developed recurrent Afib and amio gtt resumed. Cardiology was planning SONY/DCCV on 3/22. But converted to sinus,  Added vanc for foot cellulitis, and PT,OT     Interval History: sinus at this time.  Added vanc for foot cellulitis, and PT,OT     Review of Systems    Constitutional:  Positive for fatigue. Negative for chills and fever.   Respiratory:  Negative for shortness of breath.    Cardiovascular:  Negative for chest pain.   Gastrointestinal:  Negative for abdominal pain.   Musculoskeletal:  Positive for arthralgias.   Neurological:  Positive for weakness.   Psychiatric/Behavioral:  Negative for confusion.      Objective:     Vital Signs (Most Recent):  Temp: 99.6 °F (37.6 °C) (03/23/24 0701)  Pulse: 94 (03/23/24 0900)  Resp: 19 (03/23/24 0900)  BP: (!) 116/57 (03/23/24 0900)  SpO2: 98 % (03/23/24 0900) Vital Signs (24h Range):  Temp:  [98 °F (36.7 °C)-99.6 °F (37.6 °C)] 99.6 °F (37.6 °C)  Pulse:  [] 94  Resp:  [7-26] 19  SpO2:  [90 %-100 %] 98 %  BP: ()/(52-61) 116/57     Weight: 83.5 kg (184 lb 1.4 oz)  Body mass index is 27.99 kg/m².    Intake/Output Summary (Last 24 hours) at 3/23/2024 1008  Last data filed at 3/23/2024 0900  Gross per 24 hour   Intake 1273.97 ml   Output 50 ml   Net 1223.97 ml           Physical Exam  Vitals reviewed.   Constitutional:       General: He is not in acute distress.     Appearance: He is ill-appearing. He is not toxic-appearing.   HENT:      Head: Normocephalic and atraumatic.      Nose: Nose normal.      Mouth/Throat:      Mouth: Mucous membranes are moist.   Cardiovascular:      Rate and Rhythm: Tachycardia present. Rhythm irregular.      Pulses: Normal pulses.      Heart sounds: Normal heart sounds.      Comments: Afib HR in 130s  Pulmonary:      Effort: Pulmonary effort is normal. No respiratory distress.      Breath sounds: No wheezing, rhonchi or rales.      Comments: Room air  Abdominal:      General: Bowel sounds are normal.      Palpations: Abdomen is soft.   Musculoskeletal:         General: Swelling (R ankle) present. No tenderness (R ankle).      Right lower leg: No edema.      Left lower leg: No edema.   Skin:     Comments: LUE AVF in use for HD   Neurological:      Mental Status: He is alert. Mental status is  at baseline.      Motor: Weakness (RUE) present.             Significant Labs: All pertinent labs within the past 24 hours have been reviewed.    Significant Imaging: I have reviewed all pertinent imaging results/findings within the past 24 hours.    Assessment/Plan:      * Paroxysmal atrial fibrillation with RVR  Patient has Paroxysmal (<7 days) atrial fibrillation which is uncontrolled. Patient is currently in atrial fibrillation.  - new onset Afib  - on labetalol for BP at home, eliquis for chronic DVT but did miss some doses of eliquis  - in ED given diltiazem gtt but that caused hypotension  - EKG with Afib RVR with normal Qtc  - started amiodarone with conversion to NSR. Changed to PO amiodarone  - however, he had recurrent Afib and was placed back on amio gtt.   - Cardiology consulted  - plan for SONY/DCCV on 3/22/24  - continue home eliquis    Lab Results   Component Value Date    TSH 2.672 03/20/2024     - TTE normal EF, mild LAE    [id number] - VASc score:  Congestive Heart Failure or EF < 35% NO   Hypertension YES  +1   Age >= 75 NO   Diabetes Mellitus YES  +1   Stroke/TIA prior history YES  +2   Vascular disease (PAD, MI or Aortic Plaque)? YES  +1   Age 65 - 74 YES  +1   Female NO   Total 6   Sinus at this time.      Cellulitis of right foot  R ankle pain, swelling, warmth present. Potential gout vs cellulitis. 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 No fracture present. Uric acid normal. Arterial US and venous US with no clots, appropriate flow  - continue CTX for suspected cellulitis  - colchicine x1 dose again today in case gout contributes  - blood cultures currently NGTD  - on 3/22, pain is improving, warmth and hyperpigmentation are decreasing. Still has swelling  - PT, OT consults after cardioversion  Added vanc for foot cellulitis,       Anemia in chronic kidney disease  Patient's anemia is currently controlled. Has not received any PRBCs to date. Etiology likely d/t  chronic disease due to ESRD  Current CBC reviewed-   Lab Results   Component Value Date    HGB 10.6 (L) 03/22/2024    HCT 29.3 (L) 03/22/2024     Monitor serial CBC and transfuse if patient becomes hemodynamically unstable, symptomatic or H/H drops below 7/21.    History of stroke  pT,OT.      Secondary hyperparathyroidism of renal origin  Continue renvela       ESRD (end stage renal disease)  ESRD via LUE AVF. Last session 3/18/24. Missed 3/20/24 due to fatigue. Usually MWF  - hyperkalemic on admit. Does not appear volume overloaded.   - Nephrology Dr Currie consulted. Received HD on 3/20. K normalized.   - continue MWF HD    Hemiplegia and hemiparesis following cerebral infarction affecting right dominant side  No signs of acute stroke. Does have RUE weakness.   - continue home asa, eliquis, statin      Seizure disorder as sequela of cerebrovascular accident  Not currently on antiepileptics. No seizure activity reported. Monitor.       Controlled type 2 diabetes mellitus with chronic kidney disease on chronic dialysis, with long-term current use of insulin  A1c:   Lab Results   Component Value Date    HGBA1C 5.8 (H) 03/20/2024     Meds: SSI PRN to maintain goal 140-180  ADA renal diet, accuchecks, hypoglycemic protocol      Dyslipidemia  Continue statin      Benign essential HTN  Chronic. Latest blood pressure and vitals reviewed-     Temp:  [97.6 °F (36.4 °C)-99.9 °F (37.7 °C)]   Pulse:  []   Resp:  [0-41]   BP: (109-168)/(56-92)   SpO2:  [86 %-100 %] .   Home meds for hypertension were reviewed and noted below.   Hypertension Medications               amLODIPine (NORVASC) 10 MG tablet Take 1 tablet by mouth once daily    labetaloL (NORMODYNE) 100 MG tablet Take 1 tablet (100 mg total) by mouth once daily.          - BP Rx held on admit due to hypotension  - BP improved. Resumed home Rx    Will utilize p.r.n. blood pressure medication only if patient's blood pressure greater than 180/110 and he develops  symptoms such as worsening chest pain or shortness of breath.      VTE Risk Mitigation (From admission, onward)           Ordered     apixaban tablet 5 mg  2 times daily         03/20/24 1706                    Discharge Planning   GARY:      Code Status: Full Code   Is the patient medically ready for discharge?:     Reason for patient still in hospital (select all that apply): Patient trending condition  Discharge Plan A: Home with family            Critical care time spent on the evaluation and treatment of severe organ dysfunction, review of pertinent labs and imaging studies, discussions with consulting providers and discussions with patient/family:  over 30  minutes.      Braden Heredia MD  Department of Hospital Medicine   [facility] [place] - Intensive Care

## 2024-03-23 NOTE — PT/OT/SLP EVAL
Physical Therapy Evaluation     Patient Name: Miguel Moore   MRN: [id number]  Recent Surgery: Procedure(s) (LRB):  Transesophageal echo ([facility]) intra-procedure log documentation (N/A)  Cardioversion (N/A) 1 Day Post-Op    Recommendations:     Discharge Recommendations: Moderate Intensity Therapy   Discharge Equipment Recommendations: cane quad   Barriers to discharge: Decreased caregiver support and Ongoing medical treatment    Assessment:     Miguel Moore is a 68 y.o. male admitted with a medical diagnosis of Paroxysmal atrial fibrillation with RVR. He presents with the following impairments/functional limitations: weakness, impaired endurance, impaired functional mobility, gait instability, impaired balance, decreased lower extremity function, decreased safety awareness, pain, abnormal tone, impaired coordination, impaired skin, edema, impaired joint extensibility, impaired muscle length. R UE Contractures and R LE Swollen with Gout Flare Up.    Rehab Prognosis: Fair; patient would benefit from acute PT services to address these deficits and reach maximum level of function.    Plan:     During this hospitalization, patient to be seen 5 x/week to address the above listed problems via gait training, therapeutic activities, therapeutic exercises    Plan of Care Expires: 04/05/24    Subjective     Chief Complaint: Right Lower Leg hypersensitive   Patient Comments/Goals: to return to PLOF  Pain/Comfort:  Pain Rating 1: 10/10  Location - Side 1: Right  Location - Orientation 1: lower  Location 1: leg  Pain Addressed 1: Distraction    Social History:  Living Environment: Patient lives with their daughter in a single story home with number of outside stair(s): 1  Prior Level of Function: Prior to admission, patient was modified independent  Equipment Used at Home: wheelchair, cane, straight, glucometer, grab bar  DME owned (not currently used): none  Assistance Upon Discharge: family    Objective:     Communicated with  Nurse prior to session. Patient found HOB elevated with peripheral IV, telemetry, pressure relief boots, blood pressure cuff, PureWick upon PT entry to room.    General Precautions: Standard, fall   Orthopedic Precautions: Full weight bearing   Braces: N/A    Respiratory Status: Room air    Exams:  Cognition: Patient is oriented to Person, Place, and Situation  RLE ROM: Deficits: AROM = 50% due to increase swelling   RLE Strength: Deficits: Quad = 3+/5; Ankle = DNT  LLE ROM: WFL  LLE Strength: WFL  Gross Motor Coordination: R UE = unable to due to contracted in Flexed Position & R LE =  severely limited due to Gout Flare-Up   Skin Integrity/Edema:     -       Skin integrity: Wound R Rodas and R Posterior Heel = NSG notified.   -       Edema: Moderate R LE    Functional Mobility:  Gait belt applied - No  Bed Mobility  Sit to Supine: moderate assistance for LE management and trunk management  Transfers  Sit to Stand: maximal assistance with hand-held assist  Gait  Patient ambulated 5 Side Steps along EOB with hand-held assist and maximal assistance. Patient demonstrates unsteady gait, decreased step length, narrow base of support, decreased foot clearance, decreased john, decreased arm swing, reciprocal arm swing, and circumduction. All lines remained intact throughout ambulation trail.    Therapeutic Activities and Exercises:   Patient educated on role of acute care PT and PT POC, safety while in hospital including calling nurse for mobility, and call light usage      AM-PAC 6 CLICK MOBILITY  Total Score:12    Patient left HOB elevated with all lines intact, call button in reach, and RN notified.    GOALS:   Multidisciplinary Problems       Physical Therapy Goals          Problem: Physical Therapy    Goal Priority Disciplines Outcome Goal Variances Interventions   Physical Therapy Goal     PT, PT/OT Ongoing, Progressing     Description: Goals to be met by:  4/5/2024    Patient will increase functional independence  with mobility by performin. Supine to sit with Modified [place]  2. Sit to supine with Modified [place]  3. Sit to stand transfer with Modified [place]  4. Gait  x 100 feet with Modified [place] using Quad Cane.    Recommend: Moderate Intensity Therapy at time of discharge.                             History:     Past Medical History:   Diagnosis Date    Allergy     Clotting disorder     Elaquis and APlavix    Deep vein thrombosis     Diabetes mellitus, type 2     Hypertension     Nuclear sclerosis of both eyes 2017    Renal disorder     Seizures     Stroke     Urinary tract infection        Past Surgical History:   Procedure Laterality Date    CATARACT EXTRACTION W/  INTRAOCULAR LENS IMPLANT Right 10/05/2017    Dr. Tay    CATARACT EXTRACTION W/  INTRAOCULAR LENS IMPLANT Left 10/19/2017    Dr. Tay    CYSTOSCOPY W/ RETROGRADES Bilateral 2021    Procedure: CYSTOSCOPY, WITH RETROGRADE PYELOGRAM;  Surgeon: JEMMA Styles MD;  Location: [facility] OR;  Service: Urology;  Laterality: Bilateral;  REQUESTING EARLY AS POSSIBE-LO / 2021 @ 1:13PM  RN Pre Op 21, Covid NEGATIVE ON  21.  C A    ESOPHAGOGASTRODUODENOSCOPY N/A 2020    Procedure: EGD (ESOPHAGOGASTRODUODENOSCOPY);  Surgeon: Desmond Chapman MD;  Location: [facility] ENDO;  Service: Endoscopy;  Laterality: N/A;    EYE SURGERY Bilateral     cataract    FEMORAL ARTERY STENT      FRACTURE SURGERY      KNEE SURGERY      bilateral scope       Time Tracking:     PT Received On: 24  PT Start Time: 1130  PT Stop Time: 1200  PT Total Time (min): 30 min     Billable Minutes: Evaluation 30 min    Forrest Rueda, PT  3/23/2024

## 2024-03-23 NOTE — PT/OT/SLP EVAL
"Occupational Therapy   Evaluation    Name: Miguel Moore  MRN: [id number]  Admitting Diagnosis: Paroxysmal atrial fibrillation with RVR  Recent Surgery: Procedure(s) (LRB):  Transesophageal echo ([facility]) intra-procedure log documentation (N/A)  Cardioversion (N/A) 1 Day Post-Op    Recommendations:     Discharge Recommendations: Moderate Intensity Therapy  Discharge Equipment Recommendations:  to be determined by next level of care  Barriers to discharge:   (Pt is at high risk of falls, readmission and morbidity if d/c home at this time.)    Assessment:     Miguel Moore is a 68 y.o. male with a medical diagnosis of Paroxysmal atrial fibrillation with RVR.   Performance deficits affecting function: impaired endurance, weakness, impaired self care skills, impaired functional mobility, gait instability, impaired balance, decreased upper extremity function, decreased coordination, pain, decreased safety awareness, decreased ROM, edema, impaired skin, impaired coordination, impaired fine motor, impaired joint extensibility.      Pt pleasant and willing to participate in tx session this date; pt functionally limited by R ankle pain, requiring max A sit>stand for laterally stepping to HOB w/ max A x 2 persons via HHA. Pt will continue to benefit from skilled acute OT services to maximize functional capacity.     Rehab Prognosis: Good; patient would benefit from acute skilled OT services to address these deficits and reach maximum level of function.       Plan:     Patient to be seen  (3-5x/wk) to address the above listed problems via self-care/home management, therapeutic activities, therapeutic exercises  Plan of Care Expires: 04/05/24  Plan of Care Reviewed with: patient    Subjective     Chief Complaint: pain in R ankle   Patient/Family Comments/goals: "I just want to get back to my routine."     Occupational Profile:  Living Environment: Pt lives w/ dtr in [facility] w/ 0 [address]AD[address]; pt uses a [email or web address] w/ grab bars.   Previous level of " "function: Pt was mod I w/ use of W/C or SPC, depending on "how he was feeling." Pt was taking bus services to dialysis.   Roles and Routines: None stated   Equipment Used at Home: wheelchair, cane, straight, grab bar  Assistance upon Discharge: john soler     Pain/Comfort:  Pain Rating 1: 10/10  Location - Side 1: Right  Location 1: ankle  Pain Addressed 1: Reposition, Distraction, Cessation of Activity    Patients cultural, spiritual, Jew conflicts given the current situation: no    Objective:     Communicated with: Nurse prior to session.  Patient found HOB elevated with peripheral IV, telemetry, blood pressure cuff, pressure relief boots, PureWick upon OT entry to room.    General Precautions: Standard, fall  Orthopedic Precautions: N/A  Braces: N/A  Respiratory Status: Room air    Occupational Performance:    Bed Mobility:    Patient completed Scooting hips to EOB with minimum assistance and moderate assistance  Patient completed Supine to Sit with minimum assistance and moderate assistance  Patient completed Sit to Supine with moderate assistance    Functional Mobility/Transfers:  Patient completed Sit <> Stand Transfer  x 2 trials from EOB with maximal assistance  with  hand-held assist; pt hesitant to bear weight through   Functional Mobility: Pt required max A x 2 persons via HHA for laterally stepping to HOB; pt required assist for weight shifting to step w/ LLE; manual assist required for advancing RLE due to pain.     Activities of Daily Living:  Upper Body Dressing: moderate assistance for donning gown over back     Cognitive/Visual Perceptual:  Cognitive/Psychosocial Skills:     -       Oriented to: Person, Place, Time, and Situation   -       Follows Commands/attention:Follows multistep  commands  -       Communication: clear/fluent  -       Memory: No Deficits noted  -       Safety awareness/insight to disability: intact     Physical Exam:  Balance:    -       fair + sitting; fair - standing "   Postural examination/scapula alignment:    -       Rounded shoulders  -       Forward head  Skin integrity: healing wound to R anterior shin, R heel w/ boggy texture    Edema:  Moderate BLEs   Sensation:    -       Intact  Upper Extremity Range of Motion:     -       Right Upper Extremity: contracted due to old CVA  -       Left Upper Extremity: WFL  Upper Extremity Strength:    -       Right Upper Extremity: unable to assess   -       Left Upper Extremity: WFL   Strength:    -       Right Upper Extremity: unable to assess   -       Left Upper Extremity: WFL    AMPAC 6 Click ADL:  AMPAC Total Score: 14    Treatment & Education:  -Initial eval complete.   -Pt educated on role of OT and POC.       Patient left HOB elevated with all lines intact, call button in reach, bed alarm on, and BLE pressure relief boots in place.    GOALS:   Multidisciplinary Problems       Occupational Therapy Goals          Problem: Occupational Therapy    Goal Priority Disciplines Outcome Interventions   Occupational Therapy Goal     OT, PT/OT Ongoing, Progressing    Description: Goals to be met by: 4/5/24     Patient will increase functional independence with ADLs by performing:    LE Dressing with Supervision.  Grooming while standing at sink w/ seated with Supervision.  Toileting from bedside commode with Supervision for hygiene and clothing management.   Rolling to Bilateral with Supervision.   Supine to sit with Supervision.  Step transfer with Supervision  Toilet transfer to bedside commode with Supervision.  Upper extremity exercise program x15 reps per handout, with independence.                         History:     Past Medical History:   Diagnosis Date    Allergy     Clotting disorder     Elaquis and APlavix    Deep vein thrombosis     Diabetes mellitus, type 2     Hypertension     Nuclear sclerosis of both eyes 6/9/2017    Renal disorder     Seizures     Stroke     Urinary tract infection          Past Surgical History:    Procedure Laterality Date    CATARACT EXTRACTION W/  INTRAOCULAR LENS IMPLANT Right 10/05/2017    Dr. Tay    CATARACT EXTRACTION W/  INTRAOCULAR LENS IMPLANT Left 10/19/2017    Dr. Tay    CYSTOSCOPY W/ RETROGRADES Bilateral 2/18/2021    Procedure: CYSTOSCOPY, WITH RETROGRADE PYELOGRAM;  Surgeon: JEMMA Styles MD;  Location: [facility] OR;  Service: Urology;  Laterality: Bilateral;  REQUESTING EARLY AS POSSIBE-LO / 2/8/2021 @ 1:13PM  RN Pre Op 2-11-21, Covid NEGATIVE ON  2-17-21.  C A    ESOPHAGOGASTRODUODENOSCOPY N/A 8/17/2020    Procedure: EGD (ESOPHAGOGASTRODUODENOSCOPY);  Surgeon: Desmond Chapman MD;  Location: [facility];  Service: Endoscopy;  Laterality: N/A;    EYE SURGERY Bilateral     cataract    FEMORAL ARTERY STENT      FRACTURE SURGERY      KNEE SURGERY      bilateral scope       Time Tracking:     OT Date of Treatment: 03/23/24  OT Start Time: 1054  OT Stop Time: 1117  OT Total Time (min): 23 min    Billable Minutes:Evaluation 15  Therapeutic Activity 8  Total Time 23 (co-eval w/ PT)     3/23/2024

## 2024-03-23 NOTE — PLAN OF CARE
Patient off Amiodarone drip and back on PO Amiodarone, remains in SR all shift.    Temp to 102.8---blood cultures done and Tylenol given, temp decreased to 101.7.    Taking soup for lunch and dinner.   Not hungry.   PT/OT works with patient.   Wife visits.

## 2024-03-23 NOTE — CLINICAL REVIEW
Sepsis Screen (most recent)       Sepsis Screen (IP) - 03/23/24 8543       Is the patient's history or complaint suggestive of a possible infection? Yes  -    Are there at least two of the following signs and symptoms present? Yes   TMax 102.8 -    Sepsis signs/symptoms - Hyper or Hypothermia Hyperthermia >100.4 or Hypothermia < 96.8  -    Sepsis signs/symptoms - Tachycardia Tachycardia     >90  -    Sepsis signs/symptoms - Tachypnea Tachypnea     >20  -    Are any of the following organ dysfunction criteria present and not considered to be due to a chronic condition? Yes   ESRD-HD -    Organ Dysfunction Criteria Total Bilirubin > 2.0 Platelet count < 100,000  -    Organ Dysfunction Criteria - O2 O2 Saturation < 95% on room air  -    Initiate Sepsis Protocol No  -    Reason sepsis not considered Pt. receiving appropriate management  -              User Key  (r) = Recorded By, (t) = Taken By, (c) = Cosigned By      Initials Name     Gita Mays RN

## 2024-03-23 NOTE — ASSESSMENT & PLAN NOTE
R ankle pain, swelling, warmth present. Potential gout vs cellulitis. 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 No fracture present. Uric acid normal. Arterial US and venous US with no clots, appropriate flow  - continue CTX for suspected cellulitis  - colchicine x1 dose again today in case gout contributes  - blood cultures currently NGTD  - on 3/22, pain is improving, warmth and hyperpigmentation are decreasing. Still has swelling  - PT, OT consults after cardioversion  Added vanc for foot cellulitis,

## 2024-03-23 NOTE — PLAN OF CARE
Problem: Occupational Therapy  Goal: Occupational Therapy Goal  Description: Goals to be met by: 4/5/24     Patient will increase functional independence with ADLs by performing:    LE Dressing with Supervision.  Grooming while standing at sink w/ seated with Supervision.  Toileting from bedside commode with Supervision for hygiene and clothing management.   Rolling to Bilateral with Supervision.   Supine to sit with Supervision.  Step transfer with Supervision  Toilet transfer to bedside commode with Supervision.  Upper extremity exercise program x15 reps per handout, with independence.    Outcome: Ongoing, Progressing

## 2024-03-23 NOTE — ASSESSMENT & PLAN NOTE
A-fib converted to NSR after IV amiodarone. Change to po.  Continue anticoagulation.    3/23:  Patient doing fine.  Back in sinus rhythm.  Oral amiodarone 400 mg b.i.d. for 12 days followed by 200 mg daily.  Continue other therapy.  Continue Eliquis

## 2024-03-23 NOTE — SUBJECTIVE & OBJECTIVE
Interval History: sinus at this time.  Added vanc for foot cellulitis, and PT,OT     Review of Systems   Constitutional:  Positive for fatigue. Negative for chills and fever.   Respiratory:  Negative for shortness of breath.    Cardiovascular:  Negative for chest pain.   Gastrointestinal:  Negative for abdominal pain.   Musculoskeletal:  Positive for arthralgias.   Neurological:  Positive for weakness.   Psychiatric/Behavioral:  Negative for confusion.      Objective:     Vital Signs (Most Recent):  Temp: 99.6 °F (37.6 °C) (03/23/24 0701)  Pulse: 94 (03/23/24 0900)  Resp: 19 (03/23/24 0900)  BP: (!) 116/57 (03/23/24 0900)  SpO2: 98 % (03/23/24 0900) Vital Signs (24h Range):  Temp:  [98 °F (36.7 °C)-99.6 °F (37.6 °C)] 99.6 °F (37.6 °C)  Pulse:  [] 94  Resp:  [7-26] 19  SpO2:  [90 %-100 %] 98 %  BP: ()/(52-61) 116/57     Weight: 83.5 kg (184 lb 1.4 oz)  Body mass index is 27.99 kg/m².    Intake/Output Summary (Last 24 hours) at 3/23/2024 1008  Last data filed at 3/23/2024 0900  Gross per 24 hour   Intake 1273.97 ml   Output 50 ml   Net 1223.97 ml           Physical Exam  Vitals reviewed.   Constitutional:       General: He is not in acute distress.     Appearance: He is ill-appearing. He is not toxic-appearing.   HENT:      Head: Normocephalic and atraumatic.      Nose: Nose normal.      Mouth/Throat:      Mouth: Mucous membranes are moist.   Cardiovascular:      Rate and Rhythm: Tachycardia present. Rhythm irregular.      Pulses: Normal pulses.      Heart sounds: Normal heart sounds.      Comments: Afib HR in 130s  Pulmonary:      Effort: Pulmonary effort is normal. No respiratory distress.      Breath sounds: No wheezing, rhonchi or rales.      Comments: Room air  Abdominal:      General: Bowel sounds are normal.      Palpations: Abdomen is soft.   Musculoskeletal:         General: Swelling (R ankle) present. No tenderness (R ankle).      Right lower leg: No edema.      Left lower leg: No edema.    Skin:     Comments: LUE AVF in use for HD   Neurological:      Mental Status: He is alert. Mental status is at baseline.      Motor: Weakness (RUE) present.             Significant Labs: All pertinent labs within the past 24 hours have been reviewed.    Significant Imaging: I have reviewed all pertinent imaging results/findings within the past 24 hours.

## 2024-03-23 NOTE — NURSING
Pt back in A-fib RVR with HR in 140-160s. Dr. Abraham notified. Amio gtt started. No complaints of chest pain, dizziness, or SOB.

## 2024-03-23 NOTE — PROGRESS NOTES
[facility] - Intensive Care  Cardiology  Progress Note    Patient Name: Miguel Moore  MRN: [id number]  Admission Date: 3/20/2024  Hospital Length of Stay: 3 days  Code Status: Full Code   Attending Physician: Braden Heredia, *   Primary Care Physician: Raciel Raymond MD  Expected Discharge Date:   Principal Problem:Paroxysmal atrial fibrillation with RVR    Subjective:       Interval History:  Continues to be in sinus rhythm.      Review of Systems   Constitutional: Positive for malaise/fatigue.   HENT: Negative.     Eyes: Negative.    Cardiovascular: Negative.    Respiratory: Negative.     Endocrine: Negative.    Hematologic/Lymphatic: Negative.    Skin: Negative.    Musculoskeletal: Negative.    Gastrointestinal: Negative.    Genitourinary: Negative.    Neurological: Negative.    Psychiatric/Behavioral: Negative.     Allergic/Immunologic: Negative.      Objective:     Vital Signs (Most Recent):  Temp: 99.3 °F (37.4 °C) (03/23/24 1135)  Pulse: 95 (03/23/24 1300)  Resp: 18 (03/23/24 1300)  BP: (!) 117/56 (03/23/24 1300)  SpO2: 97 % (03/23/24 1300) Vital Signs (24h Range):  Temp:  [98 °F (36.7 °C)-99.6 °F (37.6 °C)] 99.3 °F (37.4 °C)  Pulse:  [] 95  Resp:  [7-38] 18  SpO2:  [90 %-100 %] 97 %  BP: ()/(54-64) 117/56     Weight: 83.5 kg (184 lb 1.4 oz)  Body mass index is 27.99 kg/m².     SpO2: 97 %         Intake/Output Summary (Last 24 hours) at 3/23/2024 1410  Last data filed at 3/23/2024 1000  Gross per 24 hour   Intake 1110.7 ml   Output --   Net 1110.7 ml         Lines/Drains/Airways       Drain  Duration             Male External Urinary Catheter 03/22/24 1400 Other (Comment) 1 day              Peripheral Intravenous Line  Duration                  Hemodialysis AV Fistula Left upper arm -- days         Peripheral IV - Single Lumen 03/20/24 1307 20 G Anterior;Proximal;Right Forearm 3 days         Peripheral IV - Single Lumen 03/20/24 1926 18 G Anterior;Right;Other (Comment) 2 days                        Physical Exam  Vitals reviewed.   Constitutional:       Appearance: He is well-developed.   HENT:      Head: Normocephalic.   Eyes:      Conjunctiva/sclera: Conjunctivae normal.      Pupils: Pupils are equal, round, and reactive to light.   Cardiovascular:      Rate and Rhythm: Normal rate and regular rhythm.      Heart sounds: Normal heart sounds.   Pulmonary:      Effort: Pulmonary effort is normal.      Breath sounds: Normal breath sounds.   Abdominal:      General: Bowel sounds are normal.      Palpations: Abdomen is soft.   Musculoskeletal:      Cervical back: Normal range of motion and neck supple.   Skin:     General: Skin is warm.   Neurological:      Mental Status: He is alert and oriented to person, place, and time.            Significant Labs:     DATA:     Laboratory:  CBC:  Recent Labs   Lab 03/20/24  1313 03/21/24  0440 03/22/24  0424   WBC 8.12 7.58 8.29   Hemoglobin 12.5 L 11.5 L 10.6 L   Hematocrit 36.0 L 33.5 L 29.3 L   Platelets 74 L 69 L 81 L         CHEMISTRIES:  Recent Labs   Lab 07/13/21  0602 07/15/21  1055 11/06/21  1434 11/07/21  0519 03/20/24  1313 03/21/24  0440 03/22/24  0424   Glucose  --   --   --   --  103 120 H 145 H   Sodium 135 L  --  131 L 135 L 133 L 132 L 131 L   Potassium 4.1   < > 5.0 3.9 6.0 H 4.7 4.5   BUN 34.6 H  --  33.8 H 41.4 H 65 H 38 H 52 H   Creatinine 7.52 H  --  8.34 H 10.20 H 13.8 H 8.9 H 10.7 H   eGFR  8 L  --  7 L 5 L  --   --   --    Calcium 9.0  --  9.3 9.2 9.1 8.2 L 8.0 L    < > = values in this interval not displayed.         CARDIAC BIOMARKERS:  Recent Labs   Lab 03/20/24  1313   Troponin I 0.108 H         COAGS:  Recent Labs   Lab 07/13/21  0602 11/06/21  1434   INR 1.0 1.1         LIPIDS/LFTS:  Recent Labs   Lab 11/02/21  0829 11/06/21  1434 04/27/23  1000 03/20/24  1313   Cholesterol 133  --  190  --    Triglycerides 110  --  106  --    HDL 39 L  --  36 L  --    LDL Cholesterol 72.0  --  132.8  --    Non-HDL Cholesterol 94   --  154  --    AST  --  18  --  19   ALT  --  17  --  12         Hemoglobin A1C   Date Value Ref Range Status   03/20/2024 5.8 (H) 4.0 - 5.6 % Final     Comment:     ADA Screening Guidelines:  5.7-6.4%  Consistent with prediabetes  >or=6.5%  Consistent with diabetes    High levels of fetal hemoglobin interfere with the HbA1C  assay. Heterozygous hemoglobin variants (HbS, HgC, etc)do  not significantly interfere with this assay.   However, presence of multiple variants may affect accuracy.     10/26/2023 5.9 (H) 4.0 - 5.6 % Final     Comment:     ADA Screening Guidelines:  5.7-6.4%  Consistent with prediabetes  >or=6.5%  Consistent with diabetes    High levels of fetal hemoglobin interfere with the HbA1C  assay. Heterozygous hemoglobin variants (HbS, HgC, etc)do  not significantly interfere with this assay.   However, presence of multiple variants may affect accuracy.     04/27/2023 5.9 (H) 4.0 - 5.6 % Final     Comment:     ADA Screening Guidelines:  5.7-6.4%  Consistent with prediabetes  >or=6.5%  Consistent with diabetes    High levels of fetal hemoglobin interfere with the HbA1C  assay. Heterozygous hemoglobin variants (HbS, HgC, etc)do  not significantly interfere with this assay.   However, presence of multiple variants may affect accuracy.         TSH  Recent Labs   Lab 03/20/24  1724   TSH 2.672         The 10-year ASCVD risk score (Sole KWON, et al., 2019) is: 29.9%    Values used to calculate the score:      Age: 68 years      Sex: Male      Is Non- : Yes      Diabetic: Yes      Tobacco smoker: No      Systolic Blood Pressure: 117 mmHg      Is BP treated: Yes      HDL Cholesterol: 36 mg/dL      Total Cholesterol: 190 mg/dL       BNP    Lab Results   Component Value Date/Time     (H) 03/20/2024 01:13 PM     (H) 08/15/2020 12:01 PM    BNP 14 07/25/2018 11:30 AM            ECHO    Results for orders placed during the hospital encounter of 03/20/24    Echo    Interpretation  Summary    Left Ventricle: There is normal systolic function with a visually estimated ejection fraction of 55 - 60%.    Right Ventricle: Normal right ventricular cavity size. Systolic function is normal.    Left Atrium: Left atrium is mildly dilated.    Aortic Valve: There is mild aortic valve sclerosis.    IVC/SVC: Normal venous pressure at 3 mmHg.      STRESS TEST    No results found for this or any previous visit.        CATH    Results for orders placed during the hospital encounter of 03/20/24    Intra-Procedure Documentation    Narrative  Aborted invasive cardiology procedure- see Procedure Log link for details.    Assessment and Plan:     Brief HPI:     * Paroxysmal atrial fibrillation with RVR  A-fib converted to NSR after IV amiodarone. Change to po.  Continue anticoagulation.    3/23:  Patient doing fine.  Back in sinus rhythm.  Oral amiodarone 400 mg b.i.d. for 12 days followed by 200 mg daily.  Continue other therapy.  Continue Eliquis    ESRD (end stage renal disease)  Per renal    Hemiplegia and hemiparesis following cerebral infarction affecting right dominant side  Stable    Controlled type 2 diabetes mellitus with chronic kidney disease on chronic dialysis, with long-term current use of insulin  Per primary    Dyslipidemia  On statin        VTE Risk Mitigation (From admission, onward)           Ordered     apixaban tablet 5 mg  2 times daily         03/20/24 1502                    Ludy Munoz MD  Cardiology  [facility] - [place] - Intensive Care    Critical Care Time:  35 minutes     Critical care was time spent personally by me on the following activities: development of treatment plan with patient or surrogate and bedside caregivers, discussions with consultants, evaluation of patient's response to treatment, examination of patient, ordering and performing treatments and interventions, ordering and review of laboratory studies, ordering and review of radiographic studies, pulse oximetry, re-evaluation  of patient's condition. This critical care time did not overlap with that of any other provider or involve time for any procedures.

## 2024-03-23 NOTE — PROGRESS NOTES
"Date of Admission:3/20/2024    SUBJECTIVE:notes foot not hurting now  Notes eating    Current Facility-Administered Medications   Medication    acetaminophen tablet 650 mg    amiodarone tablet 400 mg    amLODIPine tablet 10 mg    apixaban tablet 5 mg    atorvastatin tablet 80 mg    cefTRIAXone (ROCEPHIN) 2 g in dextrose 5 % in water (D5W) 100 mL IVPB (MB+)    dextrose 10% bolus 125 mL 125 mL    dextrose 10% bolus 250 mL 250 mL    finasteride tablet 5 mg    glucagon (human recombinant) injection 1 mg    glucose chewable tablet 16 g    glucose chewable tablet 24 g    insulin aspart U-100 pen 0-5 Units    labetaloL tablet 100 mg    melatonin tablet 6 mg    mupirocin 2 % ointment    naloxone 0.4 mg/mL injection 0.02 mg    ondansetron injection 4 mg    prochlorperazine injection Soln 5 mg    senna-docusate 8.6-50 mg per tablet 1 tablet    sevelamer carbonate tablet 800 mg    sodium chloride 0.9% bolus 250 mL 250 mL    sodium chloride 0.9% flush 10 mL    tamsulosin 24 hr capsule 0.8 mg    vancomycin (VANCOCIN) 1,000 mg in dextrose 5 % (D5W) 250 mL IVPB (Vial-Mate)    vancomycin - pharmacy to dose       Wt Readings from Last 3 Encounters:   03/22/24 83.5 kg (184 lb 1.4 oz)   03/07/24 79.8 kg (175 lb 14.8 oz)   02/27/24 79.8 kg (175 lb 14.8 oz)     Temp Readings from Last 3 Encounters:   03/23/24 99.3 °F (37.4 °C) (Oral)   02/06/24 98 °F (36.7 °C) (Oral)   02/06/24 98.1 °F (36.7 °C) (Oral)     BP Readings from Last 3 Encounters:   03/23/24 128/64   03/07/24 129/65   02/06/24 110/72     Pulse Readings from Last 3 Encounters:   03/23/24 98   02/06/24 80   02/06/24 80       Intake/Output Summary (Last 24 hours) at 3/23/2024 1339  Last data filed at 3/23/2024 1000  Gross per 24 hour   Intake 1227.29 ml   Output 50 ml   Net 1177.29 ml       PE:  GEN:wd male in nad  HEENT:ncat,eomi,mm  CVS:irregular  PULM:ctab  ABD:bs,soft  EXT:wrapped foot, avf arm left  NEURO:awake    No results for input(s): "GLU", "NA", "K", "CL", "CO2", " ""BUN", "CREATININE", "CALCIUM", "MG" in the last 24 hours.    Lab Results   Component Value Date    .0 (H) 01/08/2021    CALCIUM 8.0 (L) 03/22/2024    PHOS 3.1 03/22/2024       No results for input(s): "WBC", "RBC", "HGB", "HCT", "PLT", "MCV", "MCH", "MCHC" in the last 24 hours.        A/P:  1.esrd. cont hd MWF. HD [facility] [place].  2.htn. sbp ok. Cont bp meds.  3.paf. hr ok. Following.  4.cellulitis. ? Following. Gout? Can do nsaids.  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 No epo for now.  Add vitamins.  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 Last phos ok.  "

## 2024-03-23 NOTE — PLAN OF CARE
Pt remains in ICU on 1L NC. Pt went back into A-fib RVR and was placed on amio gtt. Currently pt has converted to sinus rhythm. No complaints of chest pain, dizziness, or SOB. Qivi in place, no urine output this shift. Free of falls, injury, and skin breakdown. CHG bath given, linen & gown changed, incontinence care performed. Plan of care reviewed with patient, expresses understanding.     Problem: Adult Inpatient Plan of Care  Goal: Plan of Care Review  Outcome: Ongoing, Progressing  Goal: Patient-Specific Goal (Individualized)  Outcome: Ongoing, Progressing  Goal: Absence of Hospital-Acquired Illness or Injury  Outcome: Ongoing, Progressing  Goal: Optimal Comfort and Wellbeing  Outcome: Ongoing, Progressing  Goal: Readiness for Transition of Care  Outcome: Ongoing, Progressing     Problem: Infection  Goal: Absence of Infection Signs and Symptoms  Outcome: Ongoing, Progressing     Problem: Diabetes Comorbidity  Goal: Blood Glucose Level Within Targeted Range  Outcome: Ongoing, Progressing     Problem: Device-Related Complication Risk (Hemodialysis)  Goal: Safe, Effective Therapy Delivery  Outcome: Ongoing, Progressing     Problem: Hemodynamic Instability (Hemodialysis)  Goal: Effective Tissue Perfusion  Outcome: Ongoing, Progressing     Problem: Infection (Hemodialysis)  Goal: Absence of Infection Signs and Symptoms  Outcome: Ongoing, Progressing     Problem: Skin Injury Risk Increased  Goal: Skin Health and Integrity  Outcome: Ongoing, Progressing

## 2024-03-23 NOTE — PLAN OF CARE
Problem: Physical Therapy  Goal: Physical Therapy Goal  Description: Goals to be met by:  2024    Patient will increase functional independence with mobility by performin. Supine to sit with Modified [place]  2. Sit to supine with Modified [place]  3. Sit to stand transfer with Modified [place]  4. Gait  x 100 feet with Modified [place] using Quad Cane.    Recommend: Moderate Intensity Therapy at time of discharge.        Outcome: Ongoing, Progressing

## 2024-03-23 NOTE — PROGRESS NOTES
Pharmacokinetic Initial Assessment: IV Vancomycin    Assessment/Plan:    Initiate intravenous vancomycin with loading dose of 1000 mg once with subsequent doses when random concentrations are less than 20 mcg/mL  Desired empiric serum trough concentration is 10 to 20 mcg/mL  Draw vancomycin random level on 03-24-24 at 0400.  Pharmacy will continue to follow and monitor vancomycin.      Please contact pharmacy at extension [phone] with any questions regarding this assessment.     Thank you for the consult,   Stephie Augustine       Patient brief summary:  Miguel Moroe is a 68 y.o. male initiated on antimicrobial therapy with IV Vancomycin for treatment of suspected skin & soft tissue infection    Drug Allergies:   Review of patient's allergies indicates:   Allergen Reactions    Ace inhibitors      Hyperkalemia 8/2018  Other reaction(s): Unknown    Penicillins Hives    Tizanidine      Felt hot       Actual Body Weight:   83.5 kg    Renal Function:   Estimated Creatinine Clearance: 6.9 mL/min (A) (based on SCr of 10.7 mg/dL (H)).,     Dialysis Method (if applicable):  intermittent HD    CBC (last 72 hours):  Recent Labs   Lab Result Units 03/20/24  1313 03/20/24  1724 03/21/24  0440 03/22/24  0424   WBC K/uL 8.12  --  7.58 8.29   Hemoglobin g/dL 12.5*  --  11.5* 10.6*   Hemoglobin A1C %  --  5.8*  --   --    Hematocrit % 36.0*  --  33.5* 29.3*   Platelets K/uL 74*  --  69* 81*   Gran % % 73.0  --  86.4* 79.1*   Lymph % % 10.0*  --  4.5* 5.2*   Mono % % 6.0  --  8.4 11.6   Eosinophil % % 0.0  --  0.0 0.0   Basophil % % 0.0  --  0.4 0.1   Differential Method  Manual  --  Automated Automated       Metabolic Panel (last 72 hours):  Recent Labs   Lab Result Units 03/20/24  1313 03/21/24  0440 03/22/24  0424   Sodium mmol/L 133* 132* 131*   Potassium mmol/L 6.0* 4.7 4.5   Chloride mmol/L 93* 95 94*   CO2 mmol/L 25 28 26   Glucose mg/dL 103 120* 145*   BUN mg/dL 65* 38* 52*   Creatinine mg/dL 13.8* 8.9* 10.7*   Albumin  g/dL 2.7*  --   --    Total Bilirubin mg/dL 1.0  --   --    Alkaline Phosphatase U/L 61  --   --    AST U/L 19  --   --    ALT U/L 12  --   --    Phosphorus mg/dL  --  3.2 3.1       Drug levels (last 3 results):  Recent Labs   Lab Result Units 03/23/24  1025   Vancomycin, Random ug/mL 5.7       Microbiologic Results:  Microbiology Results (last 7 days)       Procedure Component Value Units Date/Time    Blood Culture #2 **CANNOT BE ORDERED STAT** [[id number]] Collected: 03/20/24 1450    Order Status: Completed Specimen: Blood from Peripheral, Hand, Right Updated: 03/22/24 1703     Blood Culture, Routine No Growth to date      No Growth to date      No Growth to date    Blood Culture #1 **CANNOT BE ORDERED STAT** [[id number]] Collected: 03/20/24 1449    Order Status: Completed Specimen: Blood from Peripheral, Hand, Right Updated: 03/22/24 1703     Blood Culture, Routine No Growth to date      No Growth to date      No Growth to date

## 2024-03-23 NOTE — SUBJECTIVE & OBJECTIVE
Interval History:  Continues to be in sinus rhythm.      Review of Systems   Constitutional: Positive for malaise/fatigue.   HENT: Negative.     Eyes: Negative.    Cardiovascular: Negative.    Respiratory: Negative.     Endocrine: Negative.    Hematologic/Lymphatic: Negative.    Skin: Negative.    Musculoskeletal: Negative.    Gastrointestinal: Negative.    Genitourinary: Negative.    Neurological: Negative.    Psychiatric/Behavioral: Negative.     Allergic/Immunologic: Negative.      Objective:     Vital Signs (Most Recent):  Temp: 99.3 °F (37.4 °C) (03/23/24 1135)  Pulse: 95 (03/23/24 1300)  Resp: 18 (03/23/24 1300)  BP: (!) 117/56 (03/23/24 1300)  SpO2: 97 % (03/23/24 1300) Vital Signs (24h Range):  Temp:  [98 °F (36.7 °C)-99.6 °F (37.6 °C)] 99.3 °F (37.4 °C)  Pulse:  [] 95  Resp:  [7-38] 18  SpO2:  [90 %-100 %] 97 %  BP: ()/(54-64) 117/56     Weight: 83.5 kg (184 lb 1.4 oz)  Body mass index is 27.99 kg/m².     SpO2: 97 %         Intake/Output Summary (Last 24 hours) at 3/23/2024 1410  Last data filed at 3/23/2024 1000  Gross per 24 hour   Intake 1110.7 ml   Output --   Net 1110.7 ml         Lines/Drains/Airways       Drain  Duration             Male External Urinary Catheter 03/22/24 1400 Other (Comment) 1 day              Peripheral Intravenous Line  Duration                  Hemodialysis AV Fistula Left upper arm -- days         Peripheral IV - Single Lumen 03/20/24 1307 20 G Anterior;Proximal;Right Forearm 3 days         Peripheral IV - Single Lumen 03/20/24 1926 18 G Anterior;Right;Other (Comment) 2 days                       Physical Exam  Vitals reviewed.   Constitutional:       Appearance: He is well-developed.   HENT:      Head: Normocephalic.   Eyes:      Conjunctiva/sclera: Conjunctivae normal.      Pupils: Pupils are equal, round, and reactive to light.   Cardiovascular:      Rate and Rhythm: Normal rate and regular rhythm.      Heart sounds: Normal heart sounds.   Pulmonary:      Effort:  Pulmonary effort is normal.      Breath sounds: Normal breath sounds.   Abdominal:      General: Bowel sounds are normal.      Palpations: Abdomen is soft.   Musculoskeletal:      Cervical back: Normal range of motion and neck supple.   Skin:     General: Skin is warm.   Neurological:      Mental Status: He is alert and oriented to person, place, and time.            Significant Labs:     DATA:     Laboratory:  CBC:  Recent Labs   Lab 03/20/24  1313 03/21/24  0440 03/22/24  0424   WBC 8.12 7.58 8.29   Hemoglobin 12.5 L 11.5 L 10.6 L   Hematocrit 36.0 L 33.5 L 29.3 L   Platelets 74 L 69 L 81 L         CHEMISTRIES:  Recent Labs   Lab 07/13/21  0602 07/15/21  1055 11/06/21  1434 11/07/21  0519 03/20/24  1313 03/21/24  0440 03/22/24  0424   Glucose  --   --   --   --  103 120 H 145 H   Sodium 135 L  --  131 L 135 L 133 L 132 L 131 L   Potassium 4.1   < > 5.0 3.9 6.0 H 4.7 4.5   BUN 34.6 H  --  33.8 H 41.4 H 65 H 38 H 52 H   Creatinine 7.52 H  --  8.34 H 10.20 H 13.8 H 8.9 H 10.7 H   eGFR  8 L  --  7 L 5 L  --   --   --    Calcium 9.0  --  9.3 9.2 9.1 8.2 L 8.0 L    < > = values in this interval not displayed.         CARDIAC BIOMARKERS:  Recent Labs   Lab 03/20/24  1313   Troponin I 0.108 H         COAGS:  Recent Labs   Lab 07/13/21  0602 11/06/21  1434   INR 1.0 1.1         LIPIDS/LFTS:  Recent Labs   Lab 11/02/21  0829 11/06/21  1434 04/27/23  1000 03/20/24  1313   Cholesterol 133  --  190  --    Triglycerides 110  --  106  --    HDL 39 L  --  36 L  --    LDL Cholesterol 72.0  --  132.8  --    Non-HDL Cholesterol 94  --  154  --    AST  --  18  --  19   ALT  --  17  --  12         Hemoglobin A1C   Date Value Ref Range Status   03/20/2024 5.8 (H) 4.0 - 5.6 % Final     Comment:     ADA Screening Guidelines:  5.7-6.4%  Consistent with prediabetes  >or=6.5%  Consistent with diabetes    High levels of fetal hemoglobin interfere with the HbA1C  assay. Heterozygous hemoglobin variants (HbS, HgC, etc)do  not  significantly interfere with this assay.   However, presence of multiple variants may affect accuracy.     10/26/2023 5.9 (H) 4.0 - 5.6 % Final     Comment:     ADA Screening Guidelines:  5.7-6.4%  Consistent with prediabetes  >or=6.5%  Consistent with diabetes    High levels of fetal hemoglobin interfere with the HbA1C  assay. Heterozygous hemoglobin variants (HbS, HgC, etc)do  not significantly interfere with this assay.   However, presence of multiple variants may affect accuracy.     04/27/2023 5.9 (H) 4.0 - 5.6 % Final     Comment:     ADA Screening Guidelines:  5.7-6.4%  Consistent with prediabetes  >or=6.5%  Consistent with diabetes    High levels of fetal hemoglobin interfere with the HbA1C  assay. Heterozygous hemoglobin variants (HbS, HgC, etc)do  not significantly interfere with this assay.   However, presence of multiple variants may affect accuracy.         TSH  Recent Labs   Lab 03/20/24  1724   TSH 2.672         The 10-year ASCVD risk score (Sole KWON, et al., 2019) is: 29.9%    Values used to calculate the score:      Age: 68 years      Sex: Male      Is Non- : Yes      Diabetic: Yes      Tobacco smoker: No      Systolic Blood Pressure: 117 mmHg      Is BP treated: Yes      HDL Cholesterol: 36 mg/dL      Total Cholesterol: 190 mg/dL       BNP    Lab Results   Component Value Date/Time     (H) 03/20/2024 01:13 PM     (H) 08/15/2020 12:01 PM    BNP 14 07/25/2018 11:30 AM            ECHO    Results for orders placed during the hospital encounter of 03/20/24    Echo    Interpretation Summary    Left Ventricle: There is normal systolic function with a visually estimated ejection fraction of 55 - 60%.    Right Ventricle: Normal right ventricular cavity size. Systolic function is normal.    Left Atrium: Left atrium is mildly dilated.    Aortic Valve: There is mild aortic valve sclerosis.    IVC/SVC: Normal venous pressure at 3 mmHg.      STRESS TEST    No results found  for this or any previous visit.        CATH    Results for orders placed during the hospital encounter of 03/20/24    Intra-Procedure Documentation    Narrative  Aborted invasive cardiology procedure- see Procedure Log link for details.

## 2024-03-24 LAB
BACTERIA BLD CULT: NORMAL
BACTERIA BLD CULT: NORMAL
BASOPHILS # BLD AUTO: 0.01 K/UL (ref 0–0.2)
BASOPHILS NFR BLD: 0.1 % (ref 0–1.9)
DIFFERENTIAL METHOD BLD: ABNORMAL
EOSINOPHIL # BLD AUTO: 0 K/UL (ref 0–0.5)
EOSINOPHIL NFR BLD: 0 % (ref 0–8)
ERYTHROCYTE [DISTWIDTH] IN BLOOD BY AUTOMATED COUNT: 13.9 % (ref 11.5–14.5)
HCT VFR BLD AUTO: 25.4 % (ref 40–54)
HGB BLD-MCNC: 9.7 G/DL (ref 14–18)
IMM GRANULOCYTES # BLD AUTO: 0.12 K/UL (ref 0–0.04)
IMM GRANULOCYTES NFR BLD AUTO: 1.1 % (ref 0–0.5)
LYMPHOCYTES # BLD AUTO: 0.4 K/UL (ref 1–4.8)
LYMPHOCYTES NFR BLD: 3.3 % (ref 18–48)
MCH RBC QN AUTO: 26.6 PG (ref 27–31)
MCHC RBC AUTO-ENTMCNC: 38.2 G/DL (ref 32–36)
MCV RBC AUTO: 70 FL (ref 82–98)
MONOCYTES # BLD AUTO: 1.2 K/UL (ref 0.3–1)
MONOCYTES NFR BLD: 10.6 % (ref 4–15)
NEUTROPHILS # BLD AUTO: 9.2 K/UL (ref 1.8–7.7)
NEUTROPHILS NFR BLD: 84.9 % (ref 38–73)
NRBC BLD-RTO: 0 /100 WBC
PLATELET # BLD AUTO: 108 K/UL (ref 150–450)
PLATELET BLD QL SMEAR: ABNORMAL
PMV BLD AUTO: 10.4 FL (ref 9.2–12.9)
POCT GLUCOSE: 144 MG/DL (ref 70–110)
POCT GLUCOSE: 152 MG/DL (ref 70–110)
POCT GLUCOSE: 184 MG/DL (ref 70–110)
POCT GLUCOSE: 248 MG/DL (ref 70–110)
RBC # BLD AUTO: 3.65 M/UL (ref 4.6–6.2)
TOXIC GRANULES BLD QL SMEAR: PRESENT
VANCOMYCIN SERPL-MCNC: 17 UG/ML
WBC # BLD AUTO: 10.81 K/UL (ref 3.9–12.7)

## 2024-03-24 PROCEDURE — A4216 STERILE WATER/SALINE, 10 ML: HCPCS | Mod: HCNC | Performed by: HOSPITALIST

## 2024-03-24 PROCEDURE — 86580 TB INTRADERMAL TEST: CPT | Mod: HCNC | Performed by: HOSPITALIST

## 2024-03-24 PROCEDURE — 25000003 PHARM REV CODE 250: Mod: HCNC | Performed by: HOSPITALIST

## 2024-03-24 PROCEDURE — 30200315 PPD INTRADERMAL TEST REV CODE 302: Mod: HCNC | Performed by: HOSPITALIST

## 2024-03-24 PROCEDURE — 11000001 HC ACUTE MED/SURG PRIVATE ROOM: Mod: HCNC

## 2024-03-24 PROCEDURE — 99291 CRITICAL CARE FIRST HOUR: CPT | Mod: HCNC,,, | Performed by: INTERNAL MEDICINE

## 2024-03-24 PROCEDURE — 85025 COMPLETE CBC W/AUTO DIFF WBC: CPT | Mod: HCNC | Performed by: HOSPITALIST

## 2024-03-24 PROCEDURE — 36415 COLL VENOUS BLD VENIPUNCTURE: CPT | Mod: HCNC | Performed by: HOSPITALIST

## 2024-03-24 PROCEDURE — 63600175 PHARM REV CODE 636 W HCPCS: Mod: HCNC | Performed by: HOSPITALIST

## 2024-03-24 PROCEDURE — 80202 ASSAY OF VANCOMYCIN: CPT | Mod: HCNC | Performed by: HOSPITALIST

## 2024-03-24 PROCEDURE — 21400001 HC TELEMETRY ROOM: Mod: HCNC

## 2024-03-24 PROCEDURE — 94761 N-INVAS EAR/PLS OXIMETRY MLT: CPT | Mod: HCNC

## 2024-03-24 PROCEDURE — 25000003 PHARM REV CODE 250: Mod: HCNC | Performed by: INTERNAL MEDICINE

## 2024-03-24 RX ORDER — IBUPROFEN 200 MG
200 TABLET ORAL 3 TIMES DAILY
Status: DISCONTINUED | OUTPATIENT
Start: 2024-03-24 | End: 2024-03-26

## 2024-03-24 RX ORDER — COLCHICINE 0.6 MG/1
0.6 TABLET, FILM COATED ORAL DAILY
Status: DISCONTINUED | OUTPATIENT
Start: 2024-03-24 | End: 2024-03-26

## 2024-03-24 RX ADMIN — SEVELAMER CARBONATE 800 MG: 800 TABLET, FILM COATED ORAL at 07:03

## 2024-03-24 RX ADMIN — IBUPROFEN 200 MG: 200 TABLET, FILM COATED ORAL at 03:03

## 2024-03-24 RX ADMIN — LABETALOL HYDROCHLORIDE 100 MG: 100 TABLET, FILM COATED ORAL at 08:03

## 2024-03-24 RX ADMIN — Medication 10 ML: at 01:03

## 2024-03-24 RX ADMIN — MUPIROCIN: 20 OINTMENT TOPICAL at 08:03

## 2024-03-24 RX ADMIN — AMIODARONE HYDROCHLORIDE 400 MG: 200 TABLET ORAL at 08:03

## 2024-03-24 RX ADMIN — INSULIN ASPART 1 UNITS: 100 INJECTION, SOLUTION INTRAVENOUS; SUBCUTANEOUS at 08:03

## 2024-03-24 RX ADMIN — IBUPROFEN 200 MG: 200 TABLET, FILM COATED ORAL at 08:03

## 2024-03-24 RX ADMIN — FINASTERIDE 5 MG: 5 TABLET, FILM COATED ORAL at 08:03

## 2024-03-24 RX ADMIN — SEVELAMER CARBONATE 800 MG: 800 TABLET, FILM COATED ORAL at 04:03

## 2024-03-24 RX ADMIN — APIXABAN 5 MG: 5 TABLET, FILM COATED ORAL at 08:03

## 2024-03-24 RX ADMIN — ATORVASTATIN CALCIUM 80 MG: 40 TABLET, FILM COATED ORAL at 08:03

## 2024-03-24 RX ADMIN — NEPHROCAP 1 CAPSULE: 1 CAP ORAL at 08:03

## 2024-03-24 RX ADMIN — COLCHICINE 0.6 MG: 0.6 TABLET, FILM COATED ORAL at 08:03

## 2024-03-24 RX ADMIN — SEVELAMER CARBONATE 800 MG: 800 TABLET, FILM COATED ORAL at 11:03

## 2024-03-24 RX ADMIN — ACETAMINOPHEN 650 MG: 325 TABLET ORAL at 01:03

## 2024-03-24 RX ADMIN — DOCUSATE SODIUM AND SENNOSIDES 1 TABLET: 8.6; 5 TABLET ORAL at 08:03

## 2024-03-24 RX ADMIN — TUBERCULIN PURIFIED PROTEIN DERIVATIVE 5 UNITS: 5 INJECTION, SOLUTION INTRADERMAL at 01:03

## 2024-03-24 RX ADMIN — AMLODIPINE BESYLATE 10 MG: 5 TABLET ORAL at 08:03

## 2024-03-24 RX ADMIN — TAMSULOSIN HYDROCHLORIDE 0.8 MG: 0.4 CAPSULE ORAL at 08:03

## 2024-03-24 RX ADMIN — Medication 10 ML: at 10:03

## 2024-03-24 RX ADMIN — CEFTRIAXONE 2 G: 2 INJECTION, POWDER, FOR SOLUTION INTRAMUSCULAR; INTRAVENOUS at 05:03

## 2024-03-24 NOTE — SUBJECTIVE & OBJECTIVE
Interval History: sinus at this time.  Added vanc for foot cellulitis, and PT,OT .recommending SNF,consulted SW.    Review of Systems   Constitutional:  Positive for fatigue. Negative for chills and fever.   Respiratory:  Negative for shortness of breath.    Cardiovascular:  Negative for chest pain.   Gastrointestinal:  Negative for abdominal pain.   Musculoskeletal:  Positive for arthralgias.   Neurological:  Positive for weakness.   Psychiatric/Behavioral:  Negative for confusion.      Objective:     Vital Signs (Most Recent):  Temp: 99 °F (37.2 °C) (03/24/24 0701)  Pulse: 89 (03/24/24 1000)  Resp: (!) 23 (03/24/24 1000)  BP: (!) 106/54 (03/24/24 0900)  SpO2: (!) 93 % (03/24/24 1000) Vital Signs (24h Range):  Temp:  [99 °F (37.2 °C)-102.8 °F (39.3 °C)] 99 °F (37.2 °C)  Pulse:  [] 89  Resp:  [15-38] 23  SpO2:  [74 %-97 %] 93 %  BP: ()/(51-64) 106/54     Weight: 83.5 kg (184 lb 1.4 oz)  Body mass index is 27.99 kg/m².    Intake/Output Summary (Last 24 hours) at 3/24/2024 1013  Last data filed at 3/24/2024 0839  Gross per 24 hour   Intake 701.11 ml   Output 0 ml   Net 701.11 ml           Physical Exam  Vitals reviewed.   Constitutional:       General: He is not in acute distress.     Appearance: He is ill-appearing. He is not toxic-appearing.   HENT:      Head: Normocephalic and atraumatic.      Nose: Nose normal.      Mouth/Throat:      Mouth: Mucous membranes are moist.   Cardiovascular:      Rate and Rhythm: Tachycardia present. Rhythm irregular.      Pulses: Normal pulses.      Heart sounds: Normal heart sounds.      Comments: Afib HR in 130s  Pulmonary:      Effort: Pulmonary effort is normal. No respiratory distress.      Breath sounds: No wheezing, rhonchi or rales.      Comments: Room air  Abdominal:      General: Bowel sounds are normal.      Palpations: Abdomen is soft.   Musculoskeletal:         General: Swelling (R ankle) present. No tenderness (R ankle).      Right lower leg: No edema.       Patient is a 24 y.o. male presenting with anxiety. The history is provided by the patient and a parent. Anxiety This is a recurrent problem. The problem has not changed since onset. The pain is associated with emotional upset. The patient is experiencing no pain. Pertinent negatives include no shortness of breath.  
 patient reports running out of xanax and needs it for sleep and his bad anxiety. Past Medical History:  
Diagnosis Date  ADHD  Anxiety  Concussion  Depression Past Surgical History:  
Procedure Laterality Date  HX HEENT    
 dental  
 
   
History reviewed. No pertinent family history. Social History Social History  Marital status: SINGLE Spouse name: N/A  
 Number of children: N/A  
 Years of education: N/A Occupational History  Not on file. Social History Main Topics  Smoking status: Former Smoker  Smokeless tobacco: Current User Comment: vaping daily  Alcohol use Yes Comment: couple times a month  Drug use: Yes Special: Marijuana  Sexual activity: Not on file Other Topics Concern  Not on file Social History Narrative  No narrative on file ALLERGIES: Review of patient's allergies indicates no known allergies. Review of Systems Respiratory: Negative for chest tightness and shortness of breath. Psychiatric/Behavioral: Positive for sleep disturbance. Negative for confusion and self-injury. The patient is nervous/anxious. Occasional suicidal thoughts - but no plan and no current thoughts of suicide Vitals:  
 07/07/18 1354 BP: 121/68 Pulse: 68 Resp: 16 Temp: 97.4 °F (36.3 °C) SpO2: 99% Weight: 62.2 kg (137 lb 2 oz) Height: 5' 8\" (1.727 m) Physical Exam  
Constitutional: He is oriented to person, place, and time. He appears well-developed and well-nourished. No distress. HENT:  
Head: Normocephalic and atraumatic.   
Pulmonary/Chest: Effort normal. No Left lower leg: No edema.   Skin:     Comments: LUE AVF in use for HD   Neurological:      Mental Status: He is alert. Mental status is at baseline.      Motor: Weakness (RUE) present.             Significant Labs: All pertinent labs within the past 24 hours have been reviewed.    Significant Imaging: I have reviewed all pertinent imaging results/findings within the past 24 hours.   respiratory distress. Neurological: He is alert and oriented to person, place, and time. No cranial nerve deficit. Coordination normal.  
Skin: He is not diaphoretic. Psychiatric:  
Flat affect Nursing note and vitals reviewed. MDM Number of Diagnoses or Management Options Anxiety associated with depression:  
Diagnosis management comments: Anxiety with depression and needs medication refill Refer back to his doctors for medication adjustments. rx for 10 tabs of xanax provided ED Course Procedures

## 2024-03-24 NOTE — PROGRESS NOTES
[facility]  Cardiology  Progress Note    Patient Name: Miguel Moore  MRN: [id number]  Admission Date: 3/20/2024  Hospital Length of Stay: 4 days  Code Status: Full Code   Attending Physician: Braden Heredia, *   Primary Care Physician: Raciel Raymond MD  Expected Discharge Date:   Principal Problem:Paroxysmal atrial fibrillation with RVR    Subjective:       Interval History:  continues to be in sinus rhythm.      Review of Systems   Constitutional: Positive for malaise/fatigue.   HENT: Negative.     Eyes: Negative.    Cardiovascular: Negative.    Respiratory: Negative.     Endocrine: Negative.    Hematologic/Lymphatic: Negative.    Skin: Negative.    Musculoskeletal: Negative.    Gastrointestinal: Negative.    Genitourinary: Negative.    Neurological: Negative.    Psychiatric/Behavioral: Negative.     Allergic/Immunologic: Negative.      Objective:     Vital Signs (Most Recent):  Temp: 98.4 °F (36.9 °C) (03/24/24 1501)  Pulse: 71 (03/24/24 1501)  Resp: (!) 25 (03/24/24 1501)  BP: (!) 92/51 (03/24/24 1501)  SpO2: 95 % (03/24/24 1501) Vital Signs (24h Range):  Temp:  [98.4 °F (36.9 °C)-101.7 °F (38.7 °C)] 98.4 °F (36.9 °C)  Pulse:  [] 71  Resp:  [15-27] 25  SpO2:  [74 %-98 %] 95 %  BP: ()/(51-60) 92/51     Weight: 83.5 kg (184 lb 1.4 oz)  Body mass index is 27.99 kg/m².     SpO2: 95 %         Intake/Output Summary (Last 24 hours) at 3/24/2024 1539  Last data filed at 3/24/2024 1523  Gross per 24 hour   Intake 711.56 ml   Output 0 ml   Net 711.56 ml         Lines/Drains/Airways       Peripheral Intravenous Line  Duration                  Hemodialysis AV Fistula Left upper arm -- days         Peripheral IV - Single Lumen 03/20/24 1307 20 G Anterior;Proximal;Right Forearm 4 days         Peripheral IV - Single Lumen 03/20/24 1926 18 G Anterior;Right;Other (Comment) 3 days                       Physical Exam  Vitals reviewed.   Constitutional:       Appearance: He is well-developed.    HENT:      Head: Normocephalic.   Eyes:      Conjunctiva/sclera: Conjunctivae normal.      Pupils: Pupils are equal, round, and reactive to light.   Cardiovascular:      Rate and Rhythm: Normal rate and regular rhythm.      Heart sounds: Normal heart sounds.   Pulmonary:      Effort: Pulmonary effort is normal.      Breath sounds: Normal breath sounds.   Abdominal:      General: Bowel sounds are normal.      Palpations: Abdomen is soft.   Musculoskeletal:      Cervical back: Normal range of motion and neck supple.   Skin:     General: Skin is warm.   Neurological:      Mental Status: He is alert and oriented to person, place, and time.            Significant Labs:     DATA:     Laboratory:  CBC:  Recent Labs   Lab 03/21/24  0440 03/22/24  0424 03/24/24  0419   WBC 7.58 8.29 10.81   Hemoglobin 11.5 L 10.6 L 9.7 L   Hematocrit 33.5 L 29.3 L 25.4 L   Platelets 69 L 81 L 108 L         CHEMISTRIES:  Recent Labs   Lab 07/13/21  0602 07/15/21  1055 11/06/21  1434 11/07/21  0519 03/20/24  1313 03/21/24  0440 03/22/24  0424   Glucose  --   --   --   --  103 120 H 145 H   Sodium 135 L  --  131 L 135 L 133 L 132 L 131 L   Potassium 4.1   < > 5.0 3.9 6.0 H 4.7 4.5   BUN 34.6 H  --  33.8 H 41.4 H 65 H 38 H 52 H   Creatinine 7.52 H  --  8.34 H 10.20 H 13.8 H 8.9 H 10.7 H   eGFR  8 L  --  7 L 5 L  --   --   --    Calcium 9.0  --  9.3 9.2 9.1 8.2 L 8.0 L    < > = values in this interval not displayed.         CARDIAC BIOMARKERS:  Recent Labs   Lab 03/20/24  1313   Troponin I 0.108 H         COAGS:  Recent Labs   Lab 07/13/21  0602 11/06/21  1434   INR 1.0 1.1         LIPIDS/LFTS:  Recent Labs   Lab 11/02/21  0829 11/06/21  1434 04/27/23  1000 03/20/24  1313   Cholesterol 133  --  190  --    Triglycerides 110  --  106  --    HDL 39 L  --  36 L  --    LDL Cholesterol 72.0  --  132.8  --    Non-HDL Cholesterol 94  --  154  --    AST  --  18  --  19   ALT  --  17  --  12         Hemoglobin A1C   Date Value Ref Range  Status   03/20/2024 5.8 (H) 4.0 - 5.6 % Final     Comment:     ADA Screening Guidelines:  5.7-6.4%  Consistent with prediabetes  >or=6.5%  Consistent with diabetes    High levels of fetal hemoglobin interfere with the HbA1C  assay. Heterozygous hemoglobin variants (HbS, HgC, etc)do  not significantly interfere with this assay.   However, presence of multiple variants may affect accuracy.     10/26/2023 5.9 (H) 4.0 - 5.6 % Final     Comment:     ADA Screening Guidelines:  5.7-6.4%  Consistent with prediabetes  >or=6.5%  Consistent with diabetes    High levels of fetal hemoglobin interfere with the HbA1C  assay. Heterozygous hemoglobin variants (HbS, HgC, etc)do  not significantly interfere with this assay.   However, presence of multiple variants may affect accuracy.     04/27/2023 5.9 (H) 4.0 - 5.6 % Final     Comment:     ADA Screening Guidelines:  5.7-6.4%  Consistent with prediabetes  >or=6.5%  Consistent with diabetes    High levels of fetal hemoglobin interfere with the HbA1C  assay. Heterozygous hemoglobin variants (HbS, HgC, etc)do  not significantly interfere with this assay.   However, presence of multiple variants may affect accuracy.         TSH  Recent Labs   Lab 03/20/24  1724   TSH 2.672         The 10-year ASCVD risk score (Sole KWON, et al., 2019) is: 12.9%    Values used to calculate the score:      Age: 68 years      Sex: Male      Is Non- : Yes      Diabetic: Yes      Tobacco smoker: No      Systolic Blood Pressure: 92 mmHg      Is BP treated: No      HDL Cholesterol: 36 mg/dL      Total Cholesterol: 190 mg/dL       BNP    Lab Results   Component Value Date/Time     (H) 03/20/2024 01:13 PM     (H) 08/15/2020 12:01 PM    BNP 14 07/25/2018 11:30 AM            ECHO    Results for orders placed during the hospital encounter of 03/20/24    Echo    Interpretation Summary    Left Ventricle: There is normal systolic function with a visually estimated ejection fraction  of 55 - 60%.    Right Ventricle: Normal right ventricular cavity size. Systolic function is normal.    Left Atrium: Left atrium is mildly dilated.    Aortic Valve: There is mild aortic valve sclerosis.    IVC/SVC: Normal venous pressure at 3 mmHg.      STRESS TEST    No results found for this or any previous visit.        CATH    Results for orders placed during the hospital encounter of 03/20/24    Intra-Procedure Documentation    Narrative  Aborted invasive cardiology procedure- see Procedure Log link for details.  Assessment and Plan:     Brief HPI:     * Paroxysmal atrial fibrillation with RVR  A-fib converted to NSR after IV amiodarone. Change to po.  Continue anticoagulation.    3/23:  Patient doing fine.  Back in sinus rhythm.  Oral amiodarone 400 mg b.i.d. for 12 days followed by 200 mg daily.  Continue other therapy.  Continue Eliquis    3/24:   Continues to be in sinus rhythm.    ESRD (end stage renal disease)  Per renal    Hemiplegia and hemiparesis following cerebral infarction affecting right dominant side  Stable    Controlled type 2 diabetes mellitus with chronic kidney disease on chronic dialysis, with long-term current use of insulin  Per primary    Dyslipidemia  On statin        VTE Risk Mitigation (From admission, onward)           Ordered     apixaban tablet 5 mg  2 times daily         03/20/24 4950                    Ludy Muonz MD  Cardiology  [facility] - Intensive [facility]    Critical Care Time:  35 minutes     Critical care was time spent personally by me on the following activities: development of treatment plan with patient or surrogate and bedside caregivers, discussions with consultants, evaluation of patient's response to treatment, examination of patient, ordering and performing treatments and interventions, ordering and review of laboratory studies, ordering and review of radiographic studies, pulse oximetry, re-evaluation of patient's condition. This critical care time did not overlap  with that of any other provider or involve time for any procedures.

## 2024-03-24 NOTE — SUBJECTIVE & OBJECTIVE
Interval History:  continues to be in sinus rhythm.      Review of Systems   Constitutional: Positive for malaise/fatigue.   HENT: Negative.     Eyes: Negative.    Cardiovascular: Negative.    Respiratory: Negative.     Endocrine: Negative.    Hematologic/Lymphatic: Negative.    Skin: Negative.    Musculoskeletal: Negative.    Gastrointestinal: Negative.    Genitourinary: Negative.    Neurological: Negative.    Psychiatric/Behavioral: Negative.     Allergic/Immunologic: Negative.      Objective:     Vital Signs (Most Recent):  Temp: 98.4 °F (36.9 °C) (03/24/24 1501)  Pulse: 71 (03/24/24 1501)  Resp: (!) 25 (03/24/24 1501)  BP: (!) 92/51 (03/24/24 1501)  SpO2: 95 % (03/24/24 1501) Vital Signs (24h Range):  Temp:  [98.4 °F (36.9 °C)-101.7 °F (38.7 °C)] 98.4 °F (36.9 °C)  Pulse:  [] 71  Resp:  [15-27] 25  SpO2:  [74 %-98 %] 95 %  BP: ()/(51-60) 92/51     Weight: 83.5 kg (184 lb 1.4 oz)  Body mass index is 27.99 kg/m².     SpO2: 95 %         Intake/Output Summary (Last 24 hours) at 3/24/2024 1539  Last data filed at 3/24/2024 1523  Gross per 24 hour   Intake 711.56 ml   Output 0 ml   Net 711.56 ml         Lines/Drains/Airways       Peripheral Intravenous Line  Duration                  Hemodialysis AV Fistula Left upper arm -- days         Peripheral IV - Single Lumen 03/20/24 1307 20 G Anterior;Proximal;Right Forearm 4 days         Peripheral IV - Single Lumen 03/20/24 1926 18 G Anterior;Right;Other (Comment) 3 days                       Physical Exam  Vitals reviewed.   Constitutional:       Appearance: He is well-developed.   HENT:      Head: Normocephalic.   Eyes:      Conjunctiva/sclera: Conjunctivae normal.      Pupils: Pupils are equal, round, and reactive to light.   Cardiovascular:      Rate and Rhythm: Normal rate and regular rhythm.      Heart sounds: Normal heart sounds.   Pulmonary:      Effort: Pulmonary effort is normal.      Breath sounds: Normal breath sounds.   Abdominal:      General:  Bowel sounds are normal.      Palpations: Abdomen is soft.   Musculoskeletal:      Cervical back: Normal range of motion and neck supple.   Skin:     General: Skin is warm.   Neurological:      Mental Status: He is alert and oriented to person, place, and time.            Significant Labs:     DATA:     Laboratory:  CBC:  Recent Labs   Lab 03/21/24  0440 03/22/24  0424 03/24/24  0419   WBC 7.58 8.29 10.81   Hemoglobin 11.5 L 10.6 L 9.7 L   Hematocrit 33.5 L 29.3 L 25.4 L   Platelets 69 L 81 L 108 L         CHEMISTRIES:  Recent Labs   Lab 07/13/21  0602 07/15/21  1055 11/06/21  1434 11/07/21  0519 03/20/24  1313 03/21/24  0440 03/22/24  0424   Glucose  --   --   --   --  103 120 H 145 H   Sodium 135 L  --  131 L 135 L 133 L 132 L 131 L   Potassium 4.1   < > 5.0 3.9 6.0 H 4.7 4.5   BUN 34.6 H  --  33.8 H 41.4 H 65 H 38 H 52 H   Creatinine 7.52 H  --  8.34 H 10.20 H 13.8 H 8.9 H 10.7 H   eGFR  8 L  --  7 L 5 L  --   --   --    Calcium 9.0  --  9.3 9.2 9.1 8.2 L 8.0 L    < > = values in this interval not displayed.         CARDIAC BIOMARKERS:  Recent Labs   Lab 03/20/24  1313   Troponin I 0.108 H         COAGS:  Recent Labs   Lab 07/13/21  0602 11/06/21  1434   INR 1.0 1.1         LIPIDS/LFTS:  Recent Labs   Lab 11/02/21  0829 11/06/21  1434 04/27/23  1000 03/20/24  1313   Cholesterol 133  --  190  --    Triglycerides 110  --  106  --    HDL 39 L  --  36 L  --    LDL Cholesterol 72.0  --  132.8  --    Non-HDL Cholesterol 94  --  154  --    AST  --  18  --  19   ALT  --  17  --  12         Hemoglobin A1C   Date Value Ref Range Status   03/20/2024 5.8 (H) 4.0 - 5.6 % Final     Comment:     ADA Screening Guidelines:  5.7-6.4%  Consistent with prediabetes  >or=6.5%  Consistent with diabetes    High levels of fetal hemoglobin interfere with the HbA1C  assay. Heterozygous hemoglobin variants (HbS, HgC, etc)do  not significantly interfere with this assay.   However, presence of multiple variants may affect  accuracy.     10/26/2023 5.9 (H) 4.0 - 5.6 % Final     Comment:     ADA Screening Guidelines:  5.7-6.4%  Consistent with prediabetes  >or=6.5%  Consistent with diabetes    High levels of fetal hemoglobin interfere with the HbA1C  assay. Heterozygous hemoglobin variants (HbS, HgC, etc)do  not significantly interfere with this assay.   However, presence of multiple variants may affect accuracy.     04/27/2023 5.9 (H) 4.0 - 5.6 % Final     Comment:     ADA Screening Guidelines:  5.7-6.4%  Consistent with prediabetes  >or=6.5%  Consistent with diabetes    High levels of fetal hemoglobin interfere with the HbA1C  assay. Heterozygous hemoglobin variants (HbS, HgC, etc)do  not significantly interfere with this assay.   However, presence of multiple variants may affect accuracy.         TSH  Recent Labs   Lab 03/20/24  1724   TSH 2.672         The 10-year ASCVD risk score (Sole KWON, et al., 2019) is: 12.9%    Values used to calculate the score:      Age: 68 years      Sex: Male      Is Non- : Yes      Diabetic: Yes      Tobacco smoker: No      Systolic Blood Pressure: 92 mmHg      Is BP treated: No      HDL Cholesterol: 36 mg/dL      Total Cholesterol: 190 mg/dL       BNP    Lab Results   Component Value Date/Time     (H) 03/20/2024 01:13 PM     (H) 08/15/2020 12:01 PM    BNP 14 07/25/2018 11:30 AM            ECHO    Results for orders placed during the hospital encounter of 03/20/24    Echo    Interpretation Summary    Left Ventricle: There is normal systolic function with a visually estimated ejection fraction of 55 - 60%.    Right Ventricle: Normal right ventricular cavity size. Systolic function is normal.    Left Atrium: Left atrium is mildly dilated.    Aortic Valve: There is mild aortic valve sclerosis.    IVC/SVC: Normal venous pressure at 3 mmHg.      STRESS TEST    No results found for this or any previous visit.        CATH    Results for orders placed during the hospital  encounter of 03/20/24    Intra-Procedure Documentation    Narrative  Aborted invasive cardiology procedure- see Procedure Log link for details.

## 2024-03-24 NOTE — ASSESSMENT & PLAN NOTE
A-fib converted to NSR after IV amiodarone. Change to po.  Continue anticoagulation.    3/23:  Patient doing fine.  Back in sinus rhythm.  Oral amiodarone 400 mg b.i.d. for 12 days followed by 200 mg daily.  Continue other therapy.  Continue Eliquis    3/24:   Continues to be in sinus rhythm.

## 2024-03-24 NOTE — NURSING
[facility] [place]  Nurses Note -- 4 Eyes      3/24/2024       Skin assessed on: Q Shift      [x] No Pressure Injuries Present    [x]Prevention Measures Documented    [] Yes LDA  for Pressure Injury Previously documented     [] Yes New Pressure Injury Discovered   [] LDA for New Pressure Injury Added      Attending RN:  Nirali Modi RN     Second RN:  DARRION Artis

## 2024-03-24 NOTE — PROGRESS NOTES
[facility]  Progress Note    Patient Name: Miguel Moore  MRN: [id number]  Patient Class: IP- Inpatient   Admission Date: 3/20/2024  Length of Stay: 4 days  Attending Physician: Braden Heredia, *  Primary Care Provider: Raciel Raymond MD        Subjective:     Principal Problem:Paroxysmal atrial fibrillation with RVR        HPI:  Mr Miguel Moore is a 68 y.o. man with ESRD on HD, HTN, DM with neuropathy, history of CVA with residual R sided weakness, chronic DVT on eliquis who presented with feeling poorly. He attended his usual dialysis session on Monday 3/18 without issues. He developed fatigue and R ankle swelling. He has some pain with palpation but is able to walk. No reports of trauma. No wounds. No falls. No history of gout. Today he was feeling worse so did not go to dialysis and came to the ED. No fevers. No shortness of breath. Normal appetite. No nausea, vomiting. No chest pain. No palpitations.     In the ED, afebrile with HR initially controlled then to 130s Afib RVR. Started diltiazem but became hypotensive. Diltiazem stopped. Given antibiotics. Admitted for further management.     Overview/Hospital Course:  Mr Miguel Moore is a 68 y.o. man with ESRD on HD, DM who was admitted with new onset Afib RVR, hyperkalemia from missed HD session, and R ankle cellulitis. Started amio gtt with conversion to NSR. TTE EF 55-60%, mild LAE. Cardiology consulted. Now on PO amiodarone and continues home eliquis (on this for chronic DVT). He received HD with resolution of hyperkalemia. He is on CTX for his R ankle cellulitis and was given colchicine in case gout could contribute. This is improving. He developed recurrent Afib and amio gtt resumed. Cardiology was planning SONY/DCCV on 3/22. But converted to sinus,  Added vanc for foot cellulitis, and PT,OT ,recommending SNF,consulted .    Interval History: sinus at this time.  Added vanc for foot cellulitis, and PT,OT  .recommending SNF,consulted SW.    Review of Systems   Constitutional:  Positive for fatigue. Negative for chills and fever.   Respiratory:  Negative for shortness of breath.    Cardiovascular:  Negative for chest pain.   Gastrointestinal:  Negative for abdominal pain.   Musculoskeletal:  Positive for arthralgias.   Neurological:  Positive for weakness.   Psychiatric/Behavioral:  Negative for confusion.      Objective:     Vital Signs (Most Recent):  Temp: 99 °F (37.2 °C) (03/24/24 0701)  Pulse: 89 (03/24/24 1000)  Resp: (!) 23 (03/24/24 1000)  BP: (!) 106/54 (03/24/24 0900)  SpO2: (!) 93 % (03/24/24 1000) Vital Signs (24h Range):  Temp:  [99 °F (37.2 °C)-102.8 °F (39.3 °C)] 99 °F (37.2 °C)  Pulse:  [] 89  Resp:  [15-38] 23  SpO2:  [74 %-97 %] 93 %  BP: ()/(51-64) 106/54     Weight: 83.5 kg (184 lb 1.4 oz)  Body mass index is 27.99 kg/m².    Intake/Output Summary (Last 24 hours) at 3/24/2024 1013  Last data filed at 3/24/2024 0839  Gross per 24 hour   Intake 701.11 ml   Output 0 ml   Net 701.11 ml           Physical Exam  Vitals reviewed.   Constitutional:       General: He is not in acute distress.     Appearance: He is ill-appearing. He is not toxic-appearing.   HENT:      Head: Normocephalic and atraumatic.      Nose: Nose normal.      Mouth/Throat:      Mouth: Mucous membranes are moist.   Cardiovascular:      Rate and Rhythm: Tachycardia present. Rhythm irregular.      Pulses: Normal pulses.      Heart sounds: Normal heart sounds.      Comments: Afib HR in 130s  Pulmonary:      Effort: Pulmonary effort is normal. No respiratory distress.      Breath sounds: No wheezing, rhonchi or rales.      Comments: Room air  Abdominal:      General: Bowel sounds are normal.      Palpations: Abdomen is soft.   Musculoskeletal:         General: Swelling (R ankle) present. No tenderness (R ankle).      Right lower leg: No edema.      Left lower leg: No edema.   Skin:     Comments: LUE AVF in use for HD    Neurological:      Mental Status: He is alert. Mental status is at baseline.      Motor: Weakness (RUE) present.             Significant Labs: All pertinent labs within the past 24 hours have been reviewed.    Significant Imaging: I have reviewed all pertinent imaging results/findings within the past 24 hours.    Assessment/Plan:      * Paroxysmal atrial fibrillation with RVR  Patient has Paroxysmal (<7 days) atrial fibrillation which is uncontrolled. Patient is currently in atrial fibrillation.  - new onset Afib  - on labetalol for BP at home, eliquis for chronic DVT but did miss some doses of eliquis  - in ED given diltiazem gtt but that caused hypotension  - EKG with Afib RVR with normal Qtc  - started amiodarone with conversion to NSR. Changed to PO amiodarone  - however, he had recurrent Afib and was placed back on amio gtt.   - Cardiology consulted  - plan for SONY/DCCV on 3/22/24  - continue home eliquis    Lab Results   Component Value Date    TSH 2.672 03/20/2024     - TTE normal EF, mild LAE    [id number] - VASc score:  Congestive Heart Failure or EF < 35% NO   Hypertension YES  +1   Age >= 75 NO   Diabetes Mellitus YES  +1   Stroke/TIA prior history YES  +2   Vascular disease (PAD, MI or Aortic Plaque)? YES  +1   Age 65 - 74 YES  +1   Female NO   Total 6   Sinus at this time.      Cellulitis of right foot  R ankle pain, swelling, warmth present. Potential gout vs cellulitis. 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 No fracture present. Uric acid normal. Arterial US and venous US with no clots, appropriate flow  - continue CTX for suspected cellulitis  - colchicine x1 dose again today in case gout contributes  - blood cultures currently NGTD  - on 3/22, pain is improving, warmth and hyperpigmentation are decreasing. Still has swelling  - PT, OT consults after cardioversion  Added vanc for foot cellulitis,       Anemia in chronic kidney disease  Patient's anemia is currently  controlled. Has not received any PRBCs to date. Etiology likely d/t chronic disease due to ESRD  Current CBC reviewed-   Lab Results   Component Value Date    HGB 10.6 (L) 03/22/2024    HCT 29.3 (L) 03/22/2024     Monitor serial CBC and transfuse if patient becomes hemodynamically unstable, symptomatic or H/H drops below 7/21.    History of stroke  pT,OT.      Secondary hyperparathyroidism of renal origin  Continue renvela       ESRD (end stage renal disease)  ESRD via LUE AVF. Last session 3/18/24. Missed 3/20/24 due to fatigue. Usually MWF  - hyperkalemic on admit. Does not appear volume overloaded.   - Nephrology Dr Currie consulted. Received HD on 3/20. K normalized.   - continue MWF HD    Hemiplegia and hemiparesis following cerebral infarction affecting right dominant side  No signs of acute stroke. Does have RUE weakness.   - continue home asa, eliquis, statin      Seizure disorder as sequela of cerebrovascular accident  Not currently on antiepileptics. No seizure activity reported. Monitor.       Controlled type 2 diabetes mellitus with chronic kidney disease on chronic dialysis, with long-term current use of insulin  A1c:   Lab Results   Component Value Date    HGBA1C 5.8 (H) 03/20/2024     Meds: SSI PRN to maintain goal 140-180  ADA renal diet, accuchecks, hypoglycemic protocol      Dyslipidemia  Continue statin      Benign essential HTN  Chronic. Latest blood pressure and vitals reviewed-     Temp:  [97.6 °F (36.4 °C)-99.9 °F (37.7 °C)]   Pulse:  []   Resp:  [0-41]   BP: (109-168)/(56-92)   SpO2:  [86 %-100 %] .   Home meds for hypertension were reviewed and noted below.   Hypertension Medications               amLODIPine (NORVASC) 10 MG tablet Take 1 tablet by mouth once daily    labetaloL (NORMODYNE) 100 MG tablet Take 1 tablet (100 mg total) by mouth once daily.          - BP Rx held on admit due to hypotension  - BP improved. Resumed home Rx    Will utilize p.r.n. blood pressure medication only  if patient's blood pressure greater than 180/110 and he develops symptoms such as worsening chest pain or shortness of breath.      VTE Risk Mitigation (From admission, onward)           Ordered     apixaban tablet 5 mg  2 times daily         03/20/24 1708                    Discharge Planning   GARY:      Code Status: Full Code   Is the patient medically ready for discharge?:     Reason for patient still in hospital (select all that apply): Patient trending condition  Discharge Plan A: Home with family            Critical care time spent on the evaluation and treatment of severe organ dysfunction, review of pertinent labs and imaging studies, discussions with consulting providers and discussions with patient/family:  over 30  minutes.      Braden Heredia MD  Department of Hospital Medicine   [facility] - [place] - Intensive Care

## 2024-03-24 NOTE — PROGRESS NOTES
Date of Admission:3/20/2024    SUBJECTIVE:notes foot feeling about same, no pain now    Current Facility-Administered Medications   Medication    acetaminophen tablet 650 mg    amiodarone tablet 400 mg    apixaban tablet 5 mg    atorvastatin tablet 80 mg    cefTRIAXone (ROCEPHIN) 2 g in dextrose 5 % in water (D5W) 100 mL IVPB (MB+)    colchicine capsule/tablet 0.6 mg    dextrose 10% bolus 125 mL 125 mL    dextrose 10% bolus 250 mL 250 mL    finasteride tablet 5 mg    glucagon (human recombinant) injection 1 mg    glucose chewable tablet 16 g    glucose chewable tablet 24 g    ibuprofen tablet 200 mg    insulin aspart U-100 pen 0-5 Units    melatonin tablet 6 mg    mupirocin 2 % ointment    naloxone 0.4 mg/mL injection 0.02 mg    ondansetron injection 4 mg    prochlorperazine injection Soln 5 mg    senna-docusate 8.6-50 mg per tablet 1 tablet    sevelamer carbonate tablet 800 mg    sodium chloride 0.9% bolus 250 mL 250 mL    sodium chloride 0.9% flush 10 mL    tamsulosin 24 hr capsule 0.8 mg    tuberculin injection 5 Units    vancomycin - pharmacy to dose    vitamin renal formula (B-complex-vitamin c-folic acid) 1 mg per capsule 1 capsule       Wt Readings from Last 3 Encounters:   03/22/24 83.5 kg (184 lb 1.4 oz)   03/07/24 79.8 kg (175 lb 14.8 oz)   02/27/24 79.8 kg (175 lb 14.8 oz)     Temp Readings from Last 3 Encounters:   03/24/24 99 °F (37.2 °C) (Oral)   02/06/24 98 °F (36.7 °C) (Oral)   02/06/24 98.1 °F (36.7 °C) (Oral)     BP Readings from Last 3 Encounters:   03/24/24 (!) 106/59   03/07/24 129/65   02/06/24 110/72     Pulse Readings from Last 3 Encounters:   03/24/24 89   02/06/24 80   02/06/24 80       Intake/Output Summary (Last 24 hours) at 3/24/2024 1056  Last data filed at 3/24/2024 0839  Gross per 24 hour   Intake 701.11 ml   Output 0 ml   Net 701.11 ml         PE:  GEN:wd male in nad  HEENT:ncat,eomi,mm  CVS:irregular  PULM:ctab  ABD:bs,soft  EXT: foot boots, avf arm left  NEURO:awake    No results  "for input(s): "GLU", "NA", "K", "CL", "CO2", "BUN", "CREATININE", "CALCIUM", "MG" in the last 24 hours.    Lab Results   Component Value Date    .0 (H) 01/08/2021    CALCIUM 8.0 (L) 03/22/2024    PHOS 3.1 03/22/2024       Recent Labs   Lab 03/24/24  0419   WBC 10.81   RBC 3.65*   HGB 9.7*   HCT 25.4*   *   MCV 70*   MCH 26.6*   MCHC 38.2*           A/P:  1.esrd. cont hd MWF. HD [facility] [place]. HD in am.  2.htn. sbp ok. Cont bp meds.  3.paf. hr ok. Following.  4.cellulitis. cont tx.  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 Add epo.  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 Last phos ok.  "

## 2024-03-24 NOTE — NURSING
[facility] - [place]  Nurses Note -- 4 Eyes      3/24/2024       Skin assessed on: Q Shift      [x] No Pressure Injuries Present    [x]Prevention Measures Documented    [] Yes LDA  for Pressure Injury Previously documented     [] Yes New Pressure Injury Discovered   [] LDA for New Pressure Injury Added      Attending RN:  Steff Crowder RN     Second RN:  Nirali arriaza

## 2024-03-24 NOTE — PLAN OF CARE
Pt remains in ICU. NSR on monitor. Amio PO given. Temp up to 100.5, tylenol given. Free of falls, injury, and skin breakdown. Plan of care reviewed.     Problem: Adult Inpatient Plan of Care  Goal: Plan of Care Review  Outcome: Ongoing, Progressing  Goal: Patient-Specific Goal (Individualized)  Outcome: Ongoing, Progressing  Goal: Absence of Hospital-Acquired Illness or Injury  Outcome: Ongoing, Progressing  Goal: Optimal Comfort and Wellbeing  Outcome: Ongoing, Progressing  Goal: Readiness for Transition of Care  Outcome: Ongoing, Progressing     Problem: Infection  Goal: Absence of Infection Signs and Symptoms  Outcome: Ongoing, Progressing     Problem: Diabetes Comorbidity  Goal: Blood Glucose Level Within Targeted Range  Outcome: Ongoing, Progressing     Problem: Device-Related Complication Risk (Hemodialysis)  Goal: Safe, Effective Therapy Delivery  Outcome: Ongoing, Progressing     Problem: Hemodynamic Instability (Hemodialysis)  Goal: Effective Tissue Perfusion  Outcome: Ongoing, Progressing     Problem: Infection (Hemodialysis)  Goal: Absence of Infection Signs and Symptoms  Outcome: Ongoing, Progressing     Problem: Skin Injury Risk Increased  Goal: Skin Health and Integrity  Outcome: Ongoing, Progressing

## 2024-03-24 NOTE — PROGRESS NOTES
Pharmacokinetic Assessment Follow Up: IV Vancomycin    Vancomycin serum concentration assessment(s):    The random level was drawn correctly and can be used to guide therapy at this time. The measurement is within the desired definitive target range of 10 to 20 mcg/mL.    Vancomycin Regimen Plan:    No dose today, obtain next level 3/25 at 0400   Dose to be given after HD on 3/25    Drug levels (last 3 results):  Recent Labs   Lab Result Units 03/23/24  1025 03/24/24  0419   Vancomycin, Random ug/mL 5.7 17.0       Pharmacy will continue to follow and monitor vancomycin.    Please contact pharmacy at extension [phone] for questions regarding this assessment.    Thank you for the consult,   Miguel Tong       Patient brief summary:  Miguel Moore is a 68 y.o. male initiated on antimicrobial therapy with IV Vancomycin for treatment of skin & soft tissue infection    Drug Allergies:   Review of patient's allergies indicates:   Allergen Reactions    Ace inhibitors      Hyperkalemia 8/2018  Other reaction(s): Unknown    Penicillins Hives    Tizanidine      Felt hot       Actual Body Weight:   83.5 kg    Renal Function:   Estimated Creatinine Clearance: 6.9 mL/min (A) (based on SCr of 10.7 mg/dL (H)).,     Dialysis Method (if applicable):  intermittent HD    CBC (last 72 hours):  Recent Labs   Lab Result Units 03/22/24  0424 03/24/24  0419   WBC K/uL 8.29 10.81   Hemoglobin g/dL 10.6* 9.7*   Hematocrit % 29.3* 25.4*   Platelets K/uL 81* 108*   Gran % % 79.1* 84.9*   Lymph % % 5.2* 3.3*   Mono % % 11.6 10.6   Eosinophil % % 0.0 0.0   Basophil % % 0.1 0.1   Differential Method  Automated Automated       Metabolic Panel (last 72 hours):  Recent Labs   Lab Result Units 03/22/24  0424   Sodium mmol/L 131*   Potassium mmol/L 4.5   Chloride mmol/L 94*   CO2 mmol/L 26   Glucose mg/dL 145*   BUN mg/dL 52*   Creatinine mg/dL 10.7*   Phosphorus mg/dL 3.1       Vancomycin Administrations:  vancomycin given in the last 96 hours                      vancomycin (VANCOCIN) 1,000 mg in dextrose 5 % (D5W) 250 mL IVPB (Vial-Mate) (mg) 1,000 mg New Bag 03/23/24 1337    vancomycin 1,250 mg in dextrose 5 % (D5W) 250 mL IVPB (Vial-Mate) ()  Restarted 03/20/24 1730     1,250 mg New Bag  1633                    Microbiologic Results:  Microbiology Results (last 7 days)       Procedure Component Value Units Date/Time    Blood culture [[id number]] Collected: 03/23/24 1615    Order Status: Completed Specimen: Blood Updated: 03/23/24 2312     Blood Culture, Routine No Growth to date    Blood culture [[id number]] Collected: 03/23/24 1614    Order Status: Completed Specimen: Blood from Peripheral, Hand, Left Updated: 03/23/24 2312     Blood Culture, Routine No Growth to date    Blood Culture #2 **CANNOT BE ORDERED STAT** [[id number]] Collected: 03/20/24 1450    Order Status: Completed Specimen: Blood from Peripheral, Hand, Right Updated: 03/23/24 1703     Blood Culture, Routine No Growth to date      No Growth to date      No Growth to date      No Growth to date    Blood Culture #1 **CANNOT BE ORDERED STAT** [[id number]] Collected: 03/20/24 1449    Order Status: Completed Specimen: Blood from Peripheral, Hand, Right Updated: 03/23/24 1703     Blood Culture, Routine No Growth to date      No Growth to date      No Growth to date      No Growth to date

## 2024-03-24 NOTE — PLAN OF CARE
Patient awake, alert and oriented,  afebrile today.    Transfer orders given for telemetry, no bed available at present.    Continues sinus rhythm with rare PAC.   Family visits.

## 2024-03-25 LAB
ANION GAP SERPL CALC-SCNC: 13 MMOL/L (ref 8–16)
BASOPHILS # BLD AUTO: 0.03 K/UL (ref 0–0.2)
BASOPHILS NFR BLD: 0.2 % (ref 0–1.9)
BUN SERPL-MCNC: 49 MG/DL (ref 8–23)
CALCIUM SERPL-MCNC: 8.6 MG/DL (ref 8.7–10.5)
CHLORIDE SERPL-SCNC: 97 MMOL/L (ref 95–110)
CO2 SERPL-SCNC: 21 MMOL/L (ref 23–29)
CREAT SERPL-MCNC: 10.6 MG/DL (ref 0.5–1.4)
DIFFERENTIAL METHOD BLD: ABNORMAL
EOSINOPHIL # BLD AUTO: 0 K/UL (ref 0–0.5)
EOSINOPHIL NFR BLD: 0.1 % (ref 0–8)
ERYTHROCYTE [DISTWIDTH] IN BLOOD BY AUTOMATED COUNT: 14.2 % (ref 11.5–14.5)
EST. GFR  (NO RACE VARIABLE): 5 ML/MIN/1.73 M^2
GLUCOSE SERPL-MCNC: 136 MG/DL (ref 70–110)
HCT VFR BLD AUTO: 26.1 % (ref 40–54)
HGB BLD-MCNC: 10.1 G/DL (ref 14–18)
IMM GRANULOCYTES # BLD AUTO: 0.13 K/UL (ref 0–0.04)
IMM GRANULOCYTES NFR BLD AUTO: 0.9 % (ref 0–0.5)
LYMPHOCYTES # BLD AUTO: 0.5 K/UL (ref 1–4.8)
LYMPHOCYTES NFR BLD: 3.3 % (ref 18–48)
MCH RBC QN AUTO: 26 PG (ref 27–31)
MCHC RBC AUTO-ENTMCNC: 38.7 G/DL (ref 32–36)
MCV RBC AUTO: 67 FL (ref 82–98)
MONOCYTES # BLD AUTO: 0.9 K/UL (ref 0.3–1)
MONOCYTES NFR BLD: 6.5 % (ref 4–15)
NEUTROPHILS # BLD AUTO: 12.2 K/UL (ref 1.8–7.7)
NEUTROPHILS NFR BLD: 89 % (ref 38–73)
NRBC BLD-RTO: 0 /100 WBC
PHOSPHATE SERPL-MCNC: 2.1 MG/DL (ref 2.7–4.5)
PLATELET # BLD AUTO: 148 K/UL (ref 150–450)
PMV BLD AUTO: 11.2 FL (ref 9.2–12.9)
POCT GLUCOSE: 153 MG/DL (ref 70–110)
POCT GLUCOSE: 158 MG/DL (ref 70–110)
POCT GLUCOSE: 166 MG/DL (ref 70–110)
POTASSIUM SERPL-SCNC: 4.2 MMOL/L (ref 3.5–5.1)
RBC # BLD AUTO: 3.89 M/UL (ref 4.6–6.2)
SODIUM SERPL-SCNC: 131 MMOL/L (ref 136–145)
VANCOMYCIN SERPL-MCNC: 15.1 UG/ML
WBC # BLD AUTO: 13.76 K/UL (ref 3.9–12.7)

## 2024-03-25 PROCEDURE — 63600175 PHARM REV CODE 636 W HCPCS: Mod: HCNC | Performed by: HOSPITALIST

## 2024-03-25 PROCEDURE — 25000003 PHARM REV CODE 250: Mod: HCNC | Performed by: HOSPITALIST

## 2024-03-25 PROCEDURE — A4216 STERILE WATER/SALINE, 10 ML: HCPCS | Mod: HCNC | Performed by: HOSPITALIST

## 2024-03-25 PROCEDURE — 97116 GAIT TRAINING THERAPY: CPT | Mod: HCNC

## 2024-03-25 PROCEDURE — 97535 SELF CARE MNGMENT TRAINING: CPT | Mod: HCNC

## 2024-03-25 PROCEDURE — 80202 ASSAY OF VANCOMYCIN: CPT | Mod: HCNC | Performed by: HOSPITALIST

## 2024-03-25 PROCEDURE — 84100 ASSAY OF PHOSPHORUS: CPT | Mod: HCNC | Performed by: HOSPITALIST

## 2024-03-25 PROCEDURE — 97530 THERAPEUTIC ACTIVITIES: CPT | Mod: HCNC

## 2024-03-25 PROCEDURE — 80048 BASIC METABOLIC PNL TOTAL CA: CPT | Mod: HCNC | Performed by: HOSPITALIST

## 2024-03-25 PROCEDURE — 80100016 HC MAINTENANCE HEMODIALYSIS: Mod: HCNC

## 2024-03-25 PROCEDURE — 21400001 HC TELEMETRY ROOM: Mod: HCNC

## 2024-03-25 PROCEDURE — 99233 SBSQ HOSP IP/OBS HIGH 50: CPT | Mod: HCNC,,, | Performed by: INTERNAL MEDICINE

## 2024-03-25 PROCEDURE — 85025 COMPLETE CBC W/AUTO DIFF WBC: CPT | Mod: HCNC | Performed by: HOSPITALIST

## 2024-03-25 RX ORDER — MIDODRINE HYDROCHLORIDE 5 MG/1
10 TABLET ORAL 3 TIMES DAILY
Status: DISCONTINUED | OUTPATIENT
Start: 2024-03-25 | End: 2024-04-02

## 2024-03-25 RX ADMIN — TAMSULOSIN HYDROCHLORIDE 0.8 MG: 0.4 CAPSULE ORAL at 08:03

## 2024-03-25 RX ADMIN — MIDODRINE HYDROCHLORIDE 10 MG: 5 TABLET ORAL at 09:03

## 2024-03-25 RX ADMIN — CEFTRIAXONE 2 G: 2 INJECTION, POWDER, FOR SOLUTION INTRAMUSCULAR; INTRAVENOUS at 06:03

## 2024-03-25 RX ADMIN — DOCUSATE SODIUM AND SENNOSIDES 1 TABLET: 8.6; 5 TABLET ORAL at 08:03

## 2024-03-25 RX ADMIN — ATORVASTATIN CALCIUM 80 MG: 40 TABLET, FILM COATED ORAL at 08:03

## 2024-03-25 RX ADMIN — FINASTERIDE 5 MG: 5 TABLET, FILM COATED ORAL at 08:03

## 2024-03-25 RX ADMIN — IBUPROFEN 200 MG: 200 TABLET, FILM COATED ORAL at 08:03

## 2024-03-25 RX ADMIN — IBUPROFEN 200 MG: 200 TABLET, FILM COATED ORAL at 02:03

## 2024-03-25 RX ADMIN — MUPIROCIN: 20 OINTMENT TOPICAL at 09:03

## 2024-03-25 RX ADMIN — COLCHICINE 0.6 MG: 0.6 TABLET, FILM COATED ORAL at 08:03

## 2024-03-25 RX ADMIN — MIDODRINE HYDROCHLORIDE 10 MG: 5 TABLET ORAL at 02:03

## 2024-03-25 RX ADMIN — APIXABAN 5 MG: 5 TABLET, FILM COATED ORAL at 08:03

## 2024-03-25 RX ADMIN — VANCOMYCIN HYDROCHLORIDE 500 MG: 500 INJECTION, POWDER, LYOPHILIZED, FOR SOLUTION INTRAVENOUS at 04:03

## 2024-03-25 RX ADMIN — AMIODARONE HYDROCHLORIDE 400 MG: 200 TABLET ORAL at 08:03

## 2024-03-25 RX ADMIN — APIXABAN 5 MG: 5 TABLET, FILM COATED ORAL at 09:03

## 2024-03-25 RX ADMIN — SEVELAMER CARBONATE 800 MG: 800 TABLET, FILM COATED ORAL at 08:03

## 2024-03-25 RX ADMIN — IBUPROFEN 200 MG: 200 TABLET, FILM COATED ORAL at 09:03

## 2024-03-25 RX ADMIN — SEVELAMER CARBONATE 800 MG: 800 TABLET, FILM COATED ORAL at 04:03

## 2024-03-25 RX ADMIN — NEPHROCAP 1 CAPSULE: 1 CAP ORAL at 08:03

## 2024-03-25 RX ADMIN — AMIODARONE HYDROCHLORIDE 400 MG: 200 TABLET ORAL at 09:03

## 2024-03-25 RX ADMIN — Medication 10 ML: at 09:03

## 2024-03-25 RX ADMIN — Medication 10 ML: at 06:03

## 2024-03-25 RX ADMIN — MUPIROCIN: 20 OINTMENT TOPICAL at 08:03

## 2024-03-25 NOTE — PT/OT/SLP PROGRESS
Occupational Therapy   Treatment    Name: Miguel Moore  MRN: [id number]  Admitting Diagnosis:  Paroxysmal atrial fibrillation with RVR  3 Days Post-Op    Recommendations:     Discharge Recommendations: Moderate Intensity Therapy  Discharge Equipment Recommendations:  to be determined by next level of care  Barriers to discharge:   (Pt is at high risk of falls, readmission and morbidity if d/c home at this time.)    Assessment:     Miguel Moore is a 68 y.o. male with a medical diagnosis of Paroxysmal atrial fibrillation with RVR.  Performance deficits affecting function are weakness, impaired endurance, impaired self care skills, impaired functional mobility, gait instability, impaired balance, decreased safety awareness, decreased lower extremity function, decreased upper extremity function, decreased ROM, pain, abnormal tone, edema, impaired skin.     Pt pleasant and willing to participate in tx session this date; pt w/ less R ankle pain as compared to day of eval as he was able to ambulate functional distances w/ min A and use of quad cane. Pt still w/ weakness and will continue to benefit from skilled acute OT services to maximize functional capacity.     Rehab Prognosis:  Good; patient would benefit from acute skilled OT services to address these deficits and reach maximum level of function.       Plan:     Patient to be seen  (3-5x/wk) to address the above listed problems via self-care/home management, therapeutic activities, therapeutic exercises  Plan of Care Expires: 04/05/24  Plan of Care Reviewed with: patient    Subjective     Chief Complaint: none stated   Patient/Family Comments/goals: agreeable to participate   Pain/Comfort:  Pain Rating 1: 0/10  Pain Rating Post-Intervention 1: 0/10    Objective:     Communicated with: Nurse prior to session.  Patient found HOB elevated with telemetry, peripheral IV (hemodialysis access) upon OT entry to room.    General Precautions: Standard, fall    Orthopedic  Precautions:N/A  Braces: N/A  Respiratory Status: Room air     Occupational Performance:     Bed Mobility:    Patient completed Scooting hips to EOB with contact guard assistance  Patient completed Supine to Sit with contact guard assistance and HOB elevated     Functional Mobility/Transfers:  Patient completed Sit <> Stand Transfer x 2 trials from EOB with minimum assistance  with  quad cane   Patient completed Bed <> Chair Transfer using Step Transfer technique with minimum assistance with quad cane  Functional Mobility: Pt was able to ambulate functional distance in room w/ min A and use of quad cane for transferring to chair; pt required increased time but no LOB occurred.     Activities of Daily Living:  Upper Body Dressing: moderate assistance for doffing soiled gown to daxa new ones over front and back   Toileting: total assistance for standing isrrael-care; PT at side providing standing balance while OT performed care    [facility] 6 Click ADL: 16    Treatment & Education:  -Pt educated on role of OT and POC.   -Pt educated on importance of OOB activity w/ staff.   -Pt educated on negative effects of prolonged bed rest and was encouraged to sit up in chair at least 3 hours per day.     Patient left up in chair with all lines intact and call button in reach    GOALS:   Multidisciplinary Problems       Occupational Therapy Goals          Problem: Occupational Therapy    Goal Priority Disciplines Outcome Interventions   Occupational Therapy Goal     OT, PT/OT Ongoing, Progressing    Description: Goals to be met by: 4/5/24     Patient will increase functional independence with ADLs by performing:    LE Dressing with Supervision.  Grooming while standing at sink w/ seated with Supervision.  Toileting from bedside commode with Supervision for hygiene and clothing management.   Rolling to Bilateral with Supervision.   Supine to sit with Supervision.  Step transfer with Supervision  Toilet transfer to bedside commode with  Supervision.  Upper extremity exercise program x15 reps per handout, with independence.                         Time Tracking:     OT Date of Treatment: 03/25/24  OT Start Time: 1500  OT Stop Time: 1526  OT Total Time (min): 26 min    Billable Minutes:Self Care/Home Management 13  Therapeutic Activity 13  Total Time 26 (co-tx w/ PT)     OT/GENEVA: OT          3/25/2024

## 2024-03-25 NOTE — PLAN OF CARE
Problem: Infection  Goal: Absence of Infection Signs and Symptoms  Intervention: Prevent or Manage Infection  Flowsheets (Taken 3/25/2024 0422)  Infection Management: aseptic technique maintained     Problem: Skin Injury Risk Increased  Goal: Skin Health and Integrity  Intervention: Optimize Skin Protection  Flowsheets (Taken 3/25/2024 0422)  Pressure Reduction Techniques: frequent weight shift encouraged  Pressure Reduction Devices:   pressure-redistributing mattress utilized   heel offloading device utilized  Skin Protection:   adhesive use limited   tubing/devices free from skin contact  Head of Bed (HOB) Positioning: HOB elevated

## 2024-03-25 NOTE — PLAN OF CARE
Problem: Physical Therapy  Goal: Physical Therapy Goal  Description: Goals to be met by:  2024    Patient will increase functional independence with mobility by performin. Supine to sit with Modified [place]  2. Sit to supine with Modified [place]  3. Sit to stand transfer with Modified [place]  4. Gait  x 100 feet with Modified [place] using Quad Cane.    Recommend: Moderate Intensity Therapy at time of discharge.        Outcome: Ongoing, Progressing   Pt seen /p dialysis, appears fatigued however, agreeable.

## 2024-03-25 NOTE — SUBJECTIVE & OBJECTIVE
Interval History: sinus at this time.  Added vanc for right foot cellulitis, also on ibuprofen and colchicine for possible gout.  Foot swelling is improving.and PT,OT .recommending SNF,consulted SW.    Review of Systems   Constitutional:  Positive for fatigue. Negative for chills and fever.   Respiratory:  Negative for shortness of breath.    Cardiovascular:  Negative for chest pain.   Gastrointestinal:  Negative for abdominal pain.   Musculoskeletal:  Positive for arthralgias.   Neurological:  Positive for weakness.   Psychiatric/Behavioral:  Negative for confusion.      Objective:     Vital Signs (Most Recent):  Temp: 98.3 °F (36.8 °C) (03/25/24 0845)  Pulse: (!) 120 (03/25/24 1125)  Resp: 16 (03/25/24 0845)  BP: (!) 100/55 (03/25/24 1035)  SpO2: 95 % (03/25/24 0711) Vital Signs (24h Range):  Temp:  [98.3 °F (36.8 °C)-99.2 °F (37.3 °C)] 98.3 °F (36.8 °C)  Pulse:  [] 120  Resp:  [7-25] 16  SpO2:  [93 %-98 %] 95 %  BP: ()/(51-68) 100/55     Weight: 83.5 kg (184 lb 1.4 oz)  Body mass index is 27.99 kg/m².    Intake/Output Summary (Last 24 hours) at 3/25/2024 1208  Last data filed at 3/25/2024 0918  Gross per 24 hour   Intake 160 ml   Output --   Net 160 ml           Physical Exam  Vitals reviewed.   Constitutional:       General: He is not in acute distress.     Appearance: He is ill-appearing. He is not toxic-appearing.   HENT:      Head: Normocephalic and atraumatic.      Nose: Nose normal.      Mouth/Throat:      Mouth: Mucous membranes are moist.   Cardiovascular:      Rate and Rhythm: Tachycardia present. Rhythm irregular.      Pulses: Normal pulses.      Heart sounds: Normal heart sounds.      Comments: Afib HR in 130s  Pulmonary:      Effort: Pulmonary effort is normal. No respiratory distress.      Breath sounds: No wheezing, rhonchi or rales.      Comments: Room air  Abdominal:      General: Bowel sounds are normal.      Palpations: Abdomen is soft.   Musculoskeletal:         General: Swelling (R  ankle) present. No tenderness (R ankle).      Right lower leg: No edema.      Left lower leg: No edema.   Skin:     Comments: LUE AVF in use for HD   Neurological:      Mental Status: He is alert. Mental status is at baseline.      Motor: Weakness (RUE) present.             Significant Labs: All pertinent labs within the past 24 hours have been reviewed.    Significant Imaging: I have reviewed all pertinent imaging results/findings within the past 24 hours.

## 2024-03-25 NOTE — ASSESSMENT & PLAN NOTE
A-fib converted to NSR after IV amiodarone. Change to po.  Continue anticoagulation.    3/23:  Patient doing fine.  Back in sinus rhythm.  Oral amiodarone 400 mg b.i.d. for 12 days followed by 200 mg daily.  Continue other therapy.  Continue Eliquis    3/24:   Continues to be in sinus rhythm.    3/25:  In sinus rhythm.  Doing fine.  Continue anticoagulation.  Will sign off.  Follow-up in Cardiology Clinic

## 2024-03-25 NOTE — PROGRESS NOTES
03/25/24 1250        Hemodialysis AV Fistula Left upper arm   No placement date or time found.   Location: Left upper arm   Site Assessment Clean;Dry;Intact   Status Deaccessed   Dressing Intervention First dressing   Dressing Status Clean;Dry;Intact   Site Condition No complications   Post-Hemodialysis Assessment   Rinseback Volume (mL) 250 mL   Blood Volume Processed (Liters) 64.1 L   Dialyzer Clearance Moderately streaked   Duration of Treatment 210 minutes   Additional Fluid Intake (mL) 750 mL   Total UF (mL) 500 mL   Net Fluid Removal 0   Patient Response to Treatment Tolerated Well   Post-Hemodialysis Comments HD completed, no UF, +250 cc intake d/t low bp. AVF hemostasis X 2.

## 2024-03-25 NOTE — PROGRESS NOTES
[facility] Surg  Cardiology  Progress Note    Patient Name: Miguel Moore  MRN: [id number]  Admission Date: 3/20/2024  Hospital Length of Stay: 5 days  Code Status: Full Code   Attending Physician: Braden Heredia, *   Primary Care Physician: Raciel Raymond MD  Expected Discharge Date:   Principal Problem:Paroxysmal atrial fibrillation with RVR    Subjective:     Interval History:  Doing fine.  In sinus rhythm.      Review of Systems   Constitutional: Positive for malaise/fatigue.   HENT: Negative.     Eyes: Negative.    Cardiovascular: Negative.    Respiratory: Negative.     Endocrine: Negative.    Hematologic/Lymphatic: Negative.    Skin: Negative.    Musculoskeletal: Negative.    Gastrointestinal: Negative.    Genitourinary: Negative.    Neurological: Negative.    Psychiatric/Behavioral: Negative.     Allergic/Immunologic: Negative.      Objective:     Vital Signs (Most Recent):  Temp: 98.2 °F (36.8 °C) (03/25/24 1250)  Pulse: 99 (03/25/24 1250)  Resp: 16 (03/25/24 1250)  BP: 121/64 (03/25/24 1250)  SpO2: 95 % (03/25/24 0711) Vital Signs (24h Range):  Temp:  [98.2 °F (36.8 °C)-99.2 °F (37.3 °C)] 98.2 °F (36.8 °C)  Pulse:  [] 99  Resp:  [7-25] 16  SpO2:  [93 %-98 %] 95 %  BP: ()/(51-68) 121/64     Weight: 83.5 kg (184 lb 1.4 oz)  Body mass index is 27.99 kg/m².     SpO2: 95 %         Intake/Output Summary (Last 24 hours) at 3/25/2024 1341  Last data filed at 3/25/2024 1250  Gross per 24 hour   Intake 910 ml   Output 500 ml   Net 410 ml         Lines/Drains/Airways       Peripheral Intravenous Line  Duration                  Hemodialysis AV Fistula Left upper arm -- days         Peripheral IV - Single Lumen 03/20/24 1307 20 G Anterior;Proximal;Right Forearm 5 days         Peripheral IV - Single Lumen 03/20/24 1926 18 G Anterior;Right;Other (Comment) 4 days                       Physical Exam  Vitals reviewed.   Constitutional:       Appearance: He is well-developed.   HENT:      Head:  Normocephalic.   Eyes:      Conjunctiva/sclera: Conjunctivae normal.      Pupils: Pupils are equal, round, and reactive to light.   Cardiovascular:      Rate and Rhythm: Normal rate and regular rhythm.      Heart sounds: Normal heart sounds.   Pulmonary:      Effort: Pulmonary effort is normal.      Breath sounds: Normal breath sounds.   Abdominal:      General: Bowel sounds are normal.      Palpations: Abdomen is soft.   Musculoskeletal:      Cervical back: Normal range of motion and neck supple.   Skin:     General: Skin is warm.   Neurological:      Mental Status: He is alert and oriented to person, place, and time.            Significant Labs:     DATA:     Laboratory:  CBC:  Recent Labs   Lab 03/22/24  0424 03/24/24  0419 03/25/24  0350   WBC 8.29 10.81 13.76 H   Hemoglobin 10.6 L 9.7 L 10.1 L   Hematocrit 29.3 L 25.4 L 26.1 L   Platelets 81 L 108 L 148 L         CHEMISTRIES:  Recent Labs   Lab 07/13/21  0602 07/15/21  1055 11/06/21  1434 11/07/21  0519 03/20/24  1313 03/21/24  0440 03/22/24  0424 03/25/24  0350   Glucose  --   --   --   --    < > 120 H 145 H 136 H   Sodium 135 L  --  131 L 135 L   < > 132 L 131 L 131 L   Potassium 4.1   < > 5.0 3.9   < > 4.7 4.5 4.2   BUN 34.6 H  --  33.8 H 41.4 H   < > 38 H 52 H 49 H   Creatinine 7.52 H  --  8.34 H 10.20 H   < > 8.9 H 10.7 H 10.6 H   eGFR  8 L  --  7 L 5 L  --   --   --   --    Calcium 9.0  --  9.3 9.2   < > 8.2 L 8.0 L 8.6 L    < > = values in this interval not displayed.         CARDIAC BIOMARKERS:  Recent Labs   Lab 03/20/24  1313   Troponin I 0.108 H         COAGS:  Recent Labs   Lab 07/13/21  0602 11/06/21  1434   INR 1.0 1.1         LIPIDS/LFTS:  Recent Labs   Lab 11/02/21  0829 11/06/21  1434 04/27/23  1000 03/20/24  1313   Cholesterol 133  --  190  --    Triglycerides 110  --  106  --    HDL 39 L  --  36 L  --    LDL Cholesterol 72.0  --  132.8  --    Non-HDL Cholesterol 94  --  154  --    AST  --  18  --  19   ALT  --  17  --  12          Hemoglobin A1C   Date Value Ref Range Status   03/20/2024 5.8 (H) 4.0 - 5.6 % Final     Comment:     ADA Screening Guidelines:  5.7-6.4%  Consistent with prediabetes  >or=6.5%  Consistent with diabetes    High levels of fetal hemoglobin interfere with the HbA1C  assay. Heterozygous hemoglobin variants (HbS, HgC, etc)do  not significantly interfere with this assay.   However, presence of multiple variants may affect accuracy.     10/26/2023 5.9 (H) 4.0 - 5.6 % Final     Comment:     ADA Screening Guidelines:  5.7-6.4%  Consistent with prediabetes  >or=6.5%  Consistent with diabetes    High levels of fetal hemoglobin interfere with the HbA1C  assay. Heterozygous hemoglobin variants (HbS, HgC, etc)do  not significantly interfere with this assay.   However, presence of multiple variants may affect accuracy.     04/27/2023 5.9 (H) 4.0 - 5.6 % Final     Comment:     ADA Screening Guidelines:  5.7-6.4%  Consistent with prediabetes  >or=6.5%  Consistent with diabetes    High levels of fetal hemoglobin interfere with the HbA1C  assay. Heterozygous hemoglobin variants (HbS, HgC, etc)do  not significantly interfere with this assay.   However, presence of multiple variants may affect accuracy.         TSH  Recent Labs   Lab 03/20/24  1724   TSH 2.672         The 10-year ASCVD risk score (Sole KWON, et al., 2019) is: 20.3%    Values used to calculate the score:      Age: 68 years      Sex: Male      Is Non- : Yes      Diabetic: Yes      Tobacco smoker: No      Systolic Blood Pressure: 121 mmHg      Is BP treated: No      HDL Cholesterol: 36 mg/dL      Total Cholesterol: 190 mg/dL       BNP    Lab Results   Component Value Date/Time     (H) 03/20/2024 01:13 PM     (H) 08/15/2020 12:01 PM    BNP 14 07/25/2018 11:30 AM            ECHO    Results for orders placed during the hospital encounter of 03/20/24    Echo    Interpretation Summary    Left Ventricle: There is normal systolic  function with a visually estimated ejection fraction of 55 - 60%.    Right Ventricle: Normal right ventricular cavity size. Systolic function is normal.    Left Atrium: Left atrium is mildly dilated.    Aortic Valve: There is mild aortic valve sclerosis.    IVC/SVC: Normal venous pressure at 3 mmHg.      STRESS TEST    No results found for this or any previous visit.        CATH    Results for orders placed during the hospital encounter of 03/20/24    Intra-Procedure Documentation    Narrative  Aborted invasive cardiology procedure- see Procedure Log link for details.  Assessment and Plan:     Brief HPI:     * Paroxysmal atrial fibrillation with RVR  A-fib converted to NSR after IV amiodarone. Change to po.  Continue anticoagulation.    3/23:  Patient doing fine.  Back in sinus rhythm.  Oral amiodarone 400 mg b.i.d. for 12 days followed by 200 mg daily.  Continue other therapy.  Continue Eliquis    3/24:   Continues to be in sinus rhythm.    3/25:  In sinus rhythm.  Doing fine.  Continue anticoagulation.  Will sign off.  Follow-up in Cardiology Clinic    ESRD (end stage renal disease)  Per renal    Hemiplegia and hemiparesis following cerebral infarction affecting right dominant side  Stable    Controlled type 2 diabetes mellitus with chronic kidney disease on chronic dialysis, with long-term current use of insulin  Per primary    Dyslipidemia  On statin        VTE Risk Mitigation (From admission, onward)           Ordered     apixaban tablet 5 mg  2 times daily         03/20/24 2921                    Ludy Munoz MD  Cardiology  [facility]

## 2024-03-25 NOTE — ASSESSMENT & PLAN NOTE
R ankle pain, swelling, warmth present. Potential gout vs cellulitis. 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 No fracture present. Uric acid normal. Arterial US and venous US with no clots, appropriate flow  - continue CTX for suspected cellulitis  - colchicine x1 dose again today in case gout contributes  - blood cultures currently NGTD  - on 3/22, pain is improving, warmth and hyperpigmentation are decreasing. Still has swelling  - PT, OT consults after cardioversion  Added vanc for foot cellulitis, alsoc colchicine and ibuprofen for possible gout.

## 2024-03-25 NOTE — NURSING
Received handoff report from RianaICU RN. Patient lying on bed AAOx4, no acute distress noted, breathing even and unlabored. Safety precautions maintained, IV flush, on tele. Care assumed at this time.

## 2024-03-25 NOTE — PT/OT/SLP PROGRESS
"Physical Therapy Treatment    Patient Name:  Miguel Moore   MRN:  [id number]    Recommendations:     Discharge Recommendations: Moderate Intensity Therapy  Discharge Equipment Recommendations: cane, quad      Assessment:     Miguel Moore is a 68 y.o. male admitted with a medical diagnosis of Paroxysmal atrial fibrillation with RVR.  He presents with the following impairments/functional limitations: weakness, impaired endurance, impaired functional mobility, gait instability, impaired balance, decreased lower extremity function, decreased safety awareness, pain, abnormal tone, impaired coordination, impaired skin, edema, impaired joint extensibility, impaired muscle length.    Rehab Prognosis: Good; patient would benefit from acute skilled PT services to address these deficits and reach maximum level of function.    Recent Surgery: Procedure(s) (LRB):  Transesophageal echo ([facility]) intra-procedure log documentation (N/A)  Cardioversion (N/A) 3 Days Post-Op    Plan:     During this hospitalization, patient to be seen 5 x/week to address the identified rehab impairments via gait training, therapeutic activities, therapeutic exercises and progress toward the following goals:    Plan of Care Expires:  04/05/24    Subjective     Chief Complaint: "I'm just getting back here"  Patient/Family Comments/goals: Pt agreeable to therapy treatment.  Pain/Comfort:  Pain Rating 1: 0/10      Objective:     Communicated with nurseChayo prior to session.  Patient found HOB elevated with telemetry upon PT entry to room.     General Precautions: Standard, fall  Orthopedic Precautions: N/A  Braces: N/A  Respiratory Status: Room air     Functional Mobility:  Bed Mobility:     Supine to Sit: contact guard assistance  Transfers:     Sit to Stand:  minimum assistance with quad cane  Gait: 10' w/qc and min A; pt demonstrated poor safety with gait as he let go of qc before he made it to chair and reached for armrest to turn.  Pt stood EOB /s " AD, used bed rail to be cleaned with assistance of JEROD Griffith due to bowel incontinence.      AM-PAC 6 CLICK MOBILITY  Turning over in bed (including adjusting bedclothes, sheets and blankets)?: 4  Sitting down on and standing up from a chair with arms (e.g., wheelchair, bedside commode, etc.): 3  Moving from lying on back to sitting on the side of the bed?: 3  Moving to and from a bed to a chair (including a wheelchair)?: 3  Need to walk in hospital room?: 3  Climbing 3-5 steps with a railing?: 3  Basic Mobility Total Score: 19       Treatment & Education:  OOB, ambulated with quad cane and sat in recliner.    Patient left  reclined in chair  with all lines intact, call button in reach, wife present, and all needs in reach and pillow under LEs .    GOALS:   Multidisciplinary Problems       Physical Therapy Goals          Problem: Physical Therapy    Goal Priority Disciplines Outcome Goal Variances Interventions   Physical Therapy Goal     PT, PT/OT Ongoing, Progressing     Description: Goals to be met by:  2024    Patient will increase functional independence with mobility by performin. Supine to sit with Modified Williams  2. Sit to supine with Modified Williams  3. Sit to stand transfer with Modified Williams  4. Gait  x 100 feet with Modified Williams using Quad Cane.    Recommend: Moderate Intensity Therapy at time of discharge.                             Time Tracking:     PT Received On: 24  PT Start Time: 1500     PT Stop Time: 1525  PT Total Time (min): 25 min     Billable Minutes: Gait Training 10 and Therapeutic Activity 15    Treatment Type: Treatment  PT/PTA: PT     Number of PTA visits since last PT visit: 0     2024

## 2024-03-25 NOTE — PT/OT/SLP PROGRESS
Physical Therapy      Patient Name:  Miguel Moore   MRN:  [id number]    Patient not seen today secondary to Dialysis. Will follow-up later as able and available.

## 2024-03-25 NOTE — NURSING
[facility] [place]  Nurses Note -- 4 Eyes      3/25/2024       Skin assessed on: Transfer      [] No Pressure Injuries Present    []Prevention Measures Documented    [] Yes LDA  for Pressure Injury Previously documented     [x] Yes New Pressure Injury Discovered   [x] LDA for New Pressure Injury Added  Wound on the Left leg and left thigh    Attending RN:  Lopez Whitehead RN     Second RN:  JIM Johnson

## 2024-03-25 NOTE — PROGRESS NOTES
[facility] Medicine  Progress Note    Patient Name: Miguel Moore  MRN: [id number]  Patient Class: IP- Inpatient   Admission Date: 3/20/2024  Length of Stay: 5 days  Attending Physician: Braden Heredia, *  Primary Care Provider: Raciel Raymond MD        Subjective:     Principal Problem:Paroxysmal atrial fibrillation with RVR        HPI:  Mr Miguel Moore is a 68 y.o. man with ESRD on HD, HTN, DM with neuropathy, history of CVA with residual R sided weakness, chronic DVT on eliquis who presented with feeling poorly. He attended his usual dialysis session on Monday 3/18 without issues. He developed fatigue and R ankle swelling. He has some pain with palpation but is able to walk. No reports of trauma. No wounds. No falls. No history of gout. Today he was feeling worse so did not go to dialysis and came to the ED. No fevers. No shortness of breath. Normal appetite. No nausea, vomiting. No chest pain. No palpitations.     In the ED, afebrile with HR initially controlled then to 130s Afib RVR. Started diltiazem but became hypotensive. Diltiazem stopped. Given antibiotics. Admitted for further management.     Overview/Hospital Course:  Mr Miguel Moore is a 68 y.o. man with ESRD on HD, DM who was admitted with new onset Afib RVR, hyperkalemia from missed HD session, and R ankle cellulitis. Started amio gtt with conversion to NSR. TTE EF 55-60%, mild LAE. Cardiology consulted. Now on PO amiodarone and continues home eliquis (on this for chronic DVT). He received HD with resolution of hyperkalemia. He is on CTX for his R ankle cellulitis and was given colchicine in case gout could contribute. Also added vanc,foot swelling is improving.This is improving. He developed recurrent Afib and amio gtt resumed. Cardiology was planning SOYN/DCCV on 3/22. But converted to sinus,  Added vanc for foot cellulitis, and PT,OT ,recommending SNF,consulted SW.    Interval History: sinus at this time.  Added  vanc for right foot cellulitis, also on ibuprofen and colchicine for possible gout.  Foot swelling is improving.and PT,OT .recommending SNF,consulted SW.    Review of Systems   Constitutional:  Positive for fatigue. Negative for chills and fever.   Respiratory:  Negative for shortness of breath.    Cardiovascular:  Negative for chest pain.   Gastrointestinal:  Negative for abdominal pain.   Musculoskeletal:  Positive for arthralgias.   Neurological:  Positive for weakness.   Psychiatric/Behavioral:  Negative for confusion.      Objective:     Vital Signs (Most Recent):  Temp: 98.3 °F (36.8 °C) (03/25/24 0845)  Pulse: (!) 120 (03/25/24 1125)  Resp: 16 (03/25/24 0845)  BP: (!) 100/55 (03/25/24 1035)  SpO2: 95 % (03/25/24 0711) Vital Signs (24h Range):  Temp:  [98.3 °F (36.8 °C)-99.2 °F (37.3 °C)] 98.3 °F (36.8 °C)  Pulse:  [] 120  Resp:  [7-25] 16  SpO2:  [93 %-98 %] 95 %  BP: ()/(51-68) 100/55     Weight: 83.5 kg (184 lb 1.4 oz)  Body mass index is 27.99 kg/m².    Intake/Output Summary (Last 24 hours) at 3/25/2024 1208  Last data filed at 3/25/2024 0918  Gross per 24 hour   Intake 160 ml   Output --   Net 160 ml           Physical Exam  Vitals reviewed.   Constitutional:       General: He is not in acute distress.     Appearance: He is ill-appearing. He is not toxic-appearing.   HENT:      Head: Normocephalic and atraumatic.      Nose: Nose normal.      Mouth/Throat:      Mouth: Mucous membranes are moist.   Cardiovascular:      Rate and Rhythm: Tachycardia present. Rhythm irregular.      Pulses: Normal pulses.      Heart sounds: Normal heart sounds.      Comments: Afib HR in 130s  Pulmonary:      Effort: Pulmonary effort is normal. No respiratory distress.      Breath sounds: No wheezing, rhonchi or rales.      Comments: Room air  Abdominal:      General: Bowel sounds are normal.      Palpations: Abdomen is soft.   Musculoskeletal:         General: Swelling (R ankle) present. No tenderness (R ankle).       Right lower leg: No edema.      Left lower leg: No edema.   Skin:     Comments: CAITLIN AVF in use for HD   Neurological:      Mental Status: He is alert. Mental status is at baseline.      Motor: Weakness (RUE) present.             Significant Labs: All pertinent labs within the past 24 hours have been reviewed.    Significant Imaging: I have reviewed all pertinent imaging results/findings within the past 24 hours.    Assessment/Plan:      * Paroxysmal atrial fibrillation with RVR  Patient has Paroxysmal (<7 days) atrial fibrillation which is uncontrolled. Patient is currently in atrial fibrillation.  - new onset Afib  - on labetalol for BP at home, eliquis for chronic DVT but did miss some doses of eliquis  - in ED given diltiazem gtt but that caused hypotension  - EKG with Afib RVR with normal Qtc  - started amiodarone with conversion to NSR. Changed to PO amiodarone  - however, he had recurrent Afib and was placed back on amio gtt.   - Cardiology consulted  - plan for SONY/DCCV on 3/22/24  - continue home eliquis    Lab Results   Component Value Date    TSH 2.672 03/20/2024     - TTE normal EF, mild LAE    [id number] - VASc score:  Congestive Heart Failure or EF < 35% NO   Hypertension YES  +1   Age >= 75 NO   Diabetes Mellitus YES  +1   Stroke/TIA prior history YES  +2   Vascular disease (PAD, MI or Aortic Plaque)? YES  +1   Age 65 - 74 YES  +1   Female NO   Total 6   Sinus at this time.      Cellulitis of right foot  R ankle pain, swelling, warmth present. Potential gout vs cellulitis. 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 No fracture present. Uric acid normal. Arterial US and venous US with no clots, appropriate flow  - continue CTX for suspected cellulitis  - colchicine x1 dose again today in case gout contributes  - blood cultures currently NGTD  - on 3/22, pain is improving, warmth and hyperpigmentation are decreasing. Still has swelling  - PT, OT consults after  cardioversion  Added vanc for foot cellulitis, alsoc colchicine and ibuprofen for possible gout.      Anemia in chronic kidney disease  Patient's anemia is currently controlled. Has not received any PRBCs to date. Etiology likely d/t chronic disease due to ESRD  Current CBC reviewed-   Lab Results   Component Value Date    HGB 10.6 (L) 03/22/2024    HCT 29.3 (L) 03/22/2024     Monitor serial CBC and transfuse if patient becomes hemodynamically unstable, symptomatic or H/H drops below 7/21.    History of stroke  pT,OT.      Secondary hyperparathyroidism of renal origin  Continue renvela       ESRD (end stage renal disease)  ESRD via LUE AVF. Last session 3/18/24. Missed 3/20/24 due to fatigue. Usually MWF  - hyperkalemic on admit. Does not appear volume overloaded.   - Nephrology Dr Currie consulted. Received HD on 3/20. K normalized.   - continue MWF HD    Hemiplegia and hemiparesis following cerebral infarction affecting right dominant side  No signs of acute stroke. Does have RUE weakness.   - continue home asa, eliquis, statin      Seizure disorder as sequela of cerebrovascular accident  Not currently on antiepileptics. No seizure activity reported. Monitor.       Controlled type 2 diabetes mellitus with chronic kidney disease on chronic dialysis, with long-term current use of insulin  A1c:   Lab Results   Component Value Date    HGBA1C 5.8 (H) 03/20/2024     Meds: SSI PRN to maintain goal 140-180  ADA renal diet, accuchecks, hypoglycemic protocol      Dyslipidemia  Continue statin      Benign essential HTN  Chronic. Latest blood pressure and vitals reviewed-     Temp:  [97.6 °F (36.4 °C)-99.9 °F (37.7 °C)]   Pulse:  []   Resp:  [0-41]   BP: (109-168)/(56-92)   SpO2:  [86 %-100 %] .   Home meds for hypertension were reviewed and noted below.   Hypertension Medications               amLODIPine (NORVASC) 10 MG tablet Take 1 tablet by mouth once daily    labetaloL (NORMODYNE) 100 MG tablet Take 1 tablet (100  mg total) by mouth once daily.          - BP Rx held on admit due to hypotension  - BP improved. Resumed home Rx    Will utilize p.r.n. blood pressure medication only if patient's blood pressure greater than 180/110 and he develops symptoms such as worsening chest pain or shortness of breath.      VTE Risk Mitigation (From admission, onward)           Ordered     apixaban tablet 5 mg  2 times daily         03/20/24 1702                    Discharge Planning   GARY:      Code Status: Full Code   Is the patient medically ready for discharge?:     Reason for patient still in hospital (select all that apply): Patient trending condition  Discharge Plan A: Home with family                  Braden Heredia MD  Department of Hospital Medicine   [facility]

## 2024-03-25 NOTE — PLAN OF CARE
PT/OT recommended SNF. SW spoke to patient's daughter regarding SNF placement, she stated that patient will not want to go, but she will talk to him about it. Patient's daughter asked SW to send referral to all the providers and she will let SW know what's patient final decision tomorrow. Patient receives dialysis on MWF at [facility] in [place].     03/25/24 1724   Discharge Reassessment   Assessment Type Discharge Planning Reassessment   Did the patient's condition or plan change since previous assessment? No   Discharge Plan discussed with: Patient  (Dr. Andrade)   Communicated GARY with patient/caregiver Date not available/Unable to determine   Discharge Plan A Skilled Nursing Facility   Discharge Plan B Home Health   DME Needed Upon Discharge  none   Transition of Care Barriers None   Why the patient remains in the hospital Requires continued medical care   Post-Acute Status   Post-Acute Authorization Placement   Post-Acute Placement Status Pending payor medical review/second level review   Discharge Delays None known at this time     I provided the patient a choice of post acute providers and offered a list of CMS rated providers for SNF. Patient has declined to select a preferred provider and elects placement with the first accepting in network provider that is available to provide services as ordered by the physician.

## 2024-03-25 NOTE — SUBJECTIVE & OBJECTIVE
Interval History:  Doing fine.  In sinus rhythm.      Review of Systems   Constitutional: Positive for malaise/fatigue.   HENT: Negative.     Eyes: Negative.    Cardiovascular: Negative.    Respiratory: Negative.     Endocrine: Negative.    Hematologic/Lymphatic: Negative.    Skin: Negative.    Musculoskeletal: Negative.    Gastrointestinal: Negative.    Genitourinary: Negative.    Neurological: Negative.    Psychiatric/Behavioral: Negative.     Allergic/Immunologic: Negative.      Objective:     Vital Signs (Most Recent):  Temp: 98.2 °F (36.8 °C) (03/25/24 1250)  Pulse: 99 (03/25/24 1250)  Resp: 16 (03/25/24 1250)  BP: 121/64 (03/25/24 1250)  SpO2: 95 % (03/25/24 0711) Vital Signs (24h Range):  Temp:  [98.2 °F (36.8 °C)-99.2 °F (37.3 °C)] 98.2 °F (36.8 °C)  Pulse:  [] 99  Resp:  [7-25] 16  SpO2:  [93 %-98 %] 95 %  BP: ()/(51-68) 121/64     Weight: 83.5 kg (184 lb 1.4 oz)  Body mass index is 27.99 kg/m².     SpO2: 95 %         Intake/Output Summary (Last 24 hours) at 3/25/2024 1341  Last data filed at 3/25/2024 1250  Gross per 24 hour   Intake 910 ml   Output 500 ml   Net 410 ml         Lines/Drains/Airways       Peripheral Intravenous Line  Duration                  Hemodialysis AV Fistula Left upper arm -- days         Peripheral IV - Single Lumen 03/20/24 1307 20 G Anterior;Proximal;Right Forearm 5 days         Peripheral IV - Single Lumen 03/20/24 1926 18 G Anterior;Right;Other (Comment) 4 days                       Physical Exam  Vitals reviewed.   Constitutional:       Appearance: He is well-developed.   HENT:      Head: Normocephalic.   Eyes:      Conjunctiva/sclera: Conjunctivae normal.      Pupils: Pupils are equal, round, and reactive to light.   Cardiovascular:      Rate and Rhythm: Normal rate and regular rhythm.      Heart sounds: Normal heart sounds.   Pulmonary:      Effort: Pulmonary effort is normal.      Breath sounds: Normal breath sounds.   Abdominal:      General: Bowel sounds are  normal.      Palpations: Abdomen is soft.   Musculoskeletal:      Cervical back: Normal range of motion and neck supple.   Skin:     General: Skin is warm.   Neurological:      Mental Status: He is alert and oriented to person, place, and time.            Significant Labs:     DATA:     Laboratory:  CBC:  Recent Labs   Lab 03/22/24  0424 03/24/24  0419 03/25/24  0350   WBC 8.29 10.81 13.76 H   Hemoglobin 10.6 L 9.7 L 10.1 L   Hematocrit 29.3 L 25.4 L 26.1 L   Platelets 81 L 108 L 148 L         CHEMISTRIES:  Recent Labs   Lab 07/13/21  0602 07/15/21  1055 11/06/21  1434 11/07/21  0519 03/20/24  1313 03/21/24  0440 03/22/24  0424 03/25/24  0350   Glucose  --   --   --   --    < > 120 H 145 H 136 H   Sodium 135 L  --  131 L 135 L   < > 132 L 131 L 131 L   Potassium 4.1   < > 5.0 3.9   < > 4.7 4.5 4.2   BUN 34.6 H  --  33.8 H 41.4 H   < > 38 H 52 H 49 H   Creatinine 7.52 H  --  8.34 H 10.20 H   < > 8.9 H 10.7 H 10.6 H   eGFR  8 L  --  7 L 5 L  --   --   --   --    Calcium 9.0  --  9.3 9.2   < > 8.2 L 8.0 L 8.6 L    < > = values in this interval not displayed.         CARDIAC BIOMARKERS:  Recent Labs   Lab 03/20/24  1313   Troponin I 0.108 H         COAGS:  Recent Labs   Lab 07/13/21  0602 11/06/21  1434   INR 1.0 1.1         LIPIDS/LFTS:  Recent Labs   Lab 11/02/21  0829 11/06/21  1434 04/27/23  1000 03/20/24  1313   Cholesterol 133  --  190  --    Triglycerides 110  --  106  --    HDL 39 L  --  36 L  --    LDL Cholesterol 72.0  --  132.8  --    Non-HDL Cholesterol 94  --  154  --    AST  --  18  --  19   ALT  --  17  --  12         Hemoglobin A1C   Date Value Ref Range Status   03/20/2024 5.8 (H) 4.0 - 5.6 % Final     Comment:     ADA Screening Guidelines:  5.7-6.4%  Consistent with prediabetes  >or=6.5%  Consistent with diabetes    High levels of fetal hemoglobin interfere with the HbA1C  assay. Heterozygous hemoglobin variants (HbS, HgC, etc)do  not significantly interfere with this assay.   However,  presence of multiple variants may affect accuracy.     10/26/2023 5.9 (H) 4.0 - 5.6 % Final     Comment:     ADA Screening Guidelines:  5.7-6.4%  Consistent with prediabetes  >or=6.5%  Consistent with diabetes    High levels of fetal hemoglobin interfere with the HbA1C  assay. Heterozygous hemoglobin variants (HbS, HgC, etc)do  not significantly interfere with this assay.   However, presence of multiple variants may affect accuracy.     04/27/2023 5.9 (H) 4.0 - 5.6 % Final     Comment:     ADA Screening Guidelines:  5.7-6.4%  Consistent with prediabetes  >or=6.5%  Consistent with diabetes    High levels of fetal hemoglobin interfere with the HbA1C  assay. Heterozygous hemoglobin variants (HbS, HgC, etc)do  not significantly interfere with this assay.   However, presence of multiple variants may affect accuracy.         TSH  Recent Labs   Lab 03/20/24  1724   TSH 2.672         The 10-year ASCVD risk score (Sole KWON, et al., 2019) is: 20.3%    Values used to calculate the score:      Age: 68 years      Sex: Male      Is Non- : Yes      Diabetic: Yes      Tobacco smoker: No      Systolic Blood Pressure: 121 mmHg      Is BP treated: No      HDL Cholesterol: 36 mg/dL      Total Cholesterol: 190 mg/dL       BNP    Lab Results   Component Value Date/Time     (H) 03/20/2024 01:13 PM     (H) 08/15/2020 12:01 PM    BNP 14 07/25/2018 11:30 AM            ECHO    Results for orders placed during the hospital encounter of 03/20/24    Echo    Interpretation Summary    Left Ventricle: There is normal systolic function with a visually estimated ejection fraction of 55 - 60%.    Right Ventricle: Normal right ventricular cavity size. Systolic function is normal.    Left Atrium: Left atrium is mildly dilated.    Aortic Valve: There is mild aortic valve sclerosis.    IVC/SVC: Normal venous pressure at 3 mmHg.      STRESS TEST    No results found for this or any previous  visit.        CATH    Results for orders placed during the hospital encounter of 03/20/24    Intra-Procedure Documentation    Narrative  Aborted invasive cardiology procedure- see Procedure Log link for details.

## 2024-03-25 NOTE — PROGRESS NOTES
Pharmacokinetic Assessment Follow Up: IV Vancomycin    Vancomycin serum concentration assessment(s):    The random level was drawn correctly and can be used to guide therapy at this time. The measurement is within the desired definitive target range of 10 to 20 mcg/mL.    Vancomycin Regimen Plan:    Give 500 mg after dialysis today.  Re-dose when the random level is less than 20 mcg/mL, next level to be drawn at 0400 on 3/27/2024    Drug levels (last 3 results):  Recent Labs   Lab Result Units 03/23/24  1025 03/24/24  0419 03/25/24  0350   Vancomycin, Random ug/mL 5.7 17.0 15.1       Pharmacy will continue to follow and monitor vancomycin.    Please contact pharmacy at extension [phone] for questions regarding this assessment.    Thank you for the consult,   Jakob Goldberg Jr       Patient brief summary:  Miguel Moore is a 68 y.o. male initiated on antimicrobial therapy with IV Vancomycin for treatment of skin & soft tissue infection    Drug Allergies:   Review of patient's allergies indicates:   Allergen Reactions    Ace inhibitors      Hyperkalemia 8/2018  Other reaction(s): Unknown    Penicillins Hives    Tizanidine      Felt hot       Actual Body Weight:   83.5 kg    Renal Function:   Estimated Creatinine Clearance: 6.9 mL/min (A) (based on SCr of 10.6 mg/dL (H)).,     Dialysis Method (if applicable):  intermittent HD    CBC (last 72 hours):  Recent Labs   Lab Result Units 03/24/24  0419 03/25/24  0350   WBC K/uL 10.81 13.76*   Hemoglobin g/dL 9.7* 10.1*   Hematocrit % 25.4* 26.1*   Platelets K/uL 108* 148*   Gran % % 84.9* 89.0*   Lymph % % 3.3* 3.3*   Mono % % 10.6 6.5   Eosinophil % % 0.0 0.1   Basophil % % 0.1 0.2   Differential Method  Automated Automated       Metabolic Panel (last 72 hours):  Recent Labs   Lab Result Units 03/25/24  0350   Sodium mmol/L 131*   Potassium mmol/L 4.2   Chloride mmol/L 97   CO2 mmol/L 21*   Glucose mg/dL 136*   BUN mg/dL 49*   Creatinine mg/dL 10.6*   Phosphorus mg/dL  2.1*       Vancomycin Administrations:  vancomycin given in the last 96 hours                     vancomycin (VANCOCIN) 1,000 mg in dextrose 5 % (D5W) 250 mL IVPB (Vial-Mate) (mg) 1,000 mg New Bag 03/23/24 1337                    Microbiologic Results:  Microbiology Results (last 7 days)       Procedure Component Value Units Date/Time    Blood Culture #2 **CANNOT BE ORDERED STAT** [[id number]] Collected: 03/20/24 1450    Order Status: Completed Specimen: Blood from Peripheral, Hand, Right Updated: 03/24/24 1703     Blood Culture, Routine No Growth after 4 days.    Blood culture [[id number]] Collected: 03/23/24 1615    Order Status: Completed Specimen: Blood Updated: 03/24/24 1703     Blood Culture, Routine No Growth to date      No Growth to date    Blood culture [[id number]] Collected: 03/23/24 1614    Order Status: Completed Specimen: Blood from Peripheral, Hand, Left Updated: 03/24/24 1703     Blood Culture, Routine No Growth to date      No Growth to date    Blood Culture #1 **CANNOT BE ORDERED STAT** [[id number]] Collected: 03/20/24 1449    Order Status: Completed Specimen: Blood from Peripheral, Hand, Right Updated: 03/24/24 1703     Blood Culture, Routine No Growth after 4 days.

## 2024-03-26 PROBLEM — T14.8XXA OPEN WOUND: Status: ACTIVE | Noted: 2024-03-26

## 2024-03-26 LAB
POCT GLUCOSE: 148 MG/DL (ref 70–110)
POCT GLUCOSE: 167 MG/DL (ref 70–110)
POCT GLUCOSE: 183 MG/DL (ref 70–110)
POCT GLUCOSE: 209 MG/DL (ref 70–110)

## 2024-03-26 PROCEDURE — 25000003 PHARM REV CODE 250: Mod: HCNC | Performed by: HOSPITALIST

## 2024-03-26 PROCEDURE — A4216 STERILE WATER/SALINE, 10 ML: HCPCS | Mod: HCNC | Performed by: HOSPITALIST

## 2024-03-26 PROCEDURE — 63600175 PHARM REV CODE 636 W HCPCS: Mod: HCNC | Performed by: HOSPITALIST

## 2024-03-26 PROCEDURE — 25000003 PHARM REV CODE 250: Mod: HCNC | Performed by: STUDENT IN AN ORGANIZED HEALTH CARE EDUCATION/TRAINING PROGRAM

## 2024-03-26 PROCEDURE — 21400001 HC TELEMETRY ROOM: Mod: HCNC

## 2024-03-26 PROCEDURE — 99223 1ST HOSP IP/OBS HIGH 75: CPT | Mod: HCNC,,,

## 2024-03-26 PROCEDURE — 27000221 HC OXYGEN, UP TO 24 HOURS: Mod: HCNC

## 2024-03-26 PROCEDURE — 94761 N-INVAS EAR/PLS OXIMETRY MLT: CPT | Mod: HCNC

## 2024-03-26 RX ORDER — DIPHENOXYLATE HYDROCHLORIDE AND ATROPINE SULFATE 2.5; .025 MG/1; MG/1
1 TABLET ORAL EVERY 6 HOURS PRN
Status: DISCONTINUED | OUTPATIENT
Start: 2024-03-26 | End: 2024-04-19 | Stop reason: HOSPADM

## 2024-03-26 RX ADMIN — IBUPROFEN 200 MG: 200 TABLET, FILM COATED ORAL at 08:03

## 2024-03-26 RX ADMIN — DIPHENOXYLATE HYDROCHLORIDE AND ATROPINE SULFATE 1 TABLET: 2.5; .025 TABLET ORAL at 05:03

## 2024-03-26 RX ADMIN — NEPHROCAP 1 CAPSULE: 1 CAP ORAL at 08:03

## 2024-03-26 RX ADMIN — TAMSULOSIN HYDROCHLORIDE 0.8 MG: 0.4 CAPSULE ORAL at 08:03

## 2024-03-26 RX ADMIN — APIXABAN 5 MG: 5 TABLET, FILM COATED ORAL at 08:03

## 2024-03-26 RX ADMIN — AMIODARONE HYDROCHLORIDE 400 MG: 200 TABLET ORAL at 08:03

## 2024-03-26 RX ADMIN — Medication 10 ML: at 09:03

## 2024-03-26 RX ADMIN — MIDODRINE HYDROCHLORIDE 10 MG: 5 TABLET ORAL at 08:03

## 2024-03-26 RX ADMIN — Medication 10 ML: at 02:03

## 2024-03-26 RX ADMIN — ONDANSETRON 4 MG: 2 INJECTION INTRAMUSCULAR; INTRAVENOUS at 11:03

## 2024-03-26 RX ADMIN — Medication 10 ML: at 05:03

## 2024-03-26 RX ADMIN — COLCHICINE 0.6 MG: 0.6 TABLET, FILM COATED ORAL at 08:03

## 2024-03-26 RX ADMIN — ATORVASTATIN CALCIUM 80 MG: 40 TABLET, FILM COATED ORAL at 08:03

## 2024-03-26 RX ADMIN — FINASTERIDE 5 MG: 5 TABLET, FILM COATED ORAL at 08:03

## 2024-03-26 RX ADMIN — CEFTRIAXONE 2 G: 2 INJECTION, POWDER, FOR SOLUTION INTRAMUSCULAR; INTRAVENOUS at 05:03

## 2024-03-26 RX ADMIN — INSULIN ASPART 1 UNITS: 100 INJECTION, SOLUTION INTRAVENOUS; SUBCUTANEOUS at 08:03

## 2024-03-26 RX ADMIN — MIDODRINE HYDROCHLORIDE 10 MG: 5 TABLET ORAL at 03:03

## 2024-03-26 NOTE — PT/OT/SLP PROGRESS
Occupational Therapy      Patient Name:  Miguel Moore   MRN:  [id number]    Patient not seen today secondary to pt declined due to fatigue and weakness; pt provided w/ education and encouragement but stated he rather try tomorrow . Will follow-up.    3/26/2024

## 2024-03-26 NOTE — NURSING
[facility]  Nurses Note -- 4 Eyes      3/26/2024       Skin assessed on: Q Shift      [] No Pressure Injuries Present    []Prevention Measures Documented    [x] Yes LDA  for Pressure Injury Previously documented     [] Yes New Pressure Injury Discovered   [] LDA for New Pressure Injury Added      Attending RN:  Cherry Payne RN     Second RN:  Aman

## 2024-03-26 NOTE — ASSESSMENT & PLAN NOTE
R ankle pain, swelling, warmth present. Potential gout vs cellulitis. 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 No fracture present. Uric acid normal. Arterial US and venous US with no clots, appropriate flow  - continue CTX for suspected cellulitis  - colchicine x1 dose again today in case gout contributes  - blood cultures currently NGTD  - on 3/22, pain is improving, warmth and hyperpigmentation are decreasing. Still has swelling  - PT, OT consults after cardioversion  - vanc added - on day #6 of IV antibiotics. Plan to complete 7 days.  - Colchicine started for possible gout flare

## 2024-03-26 NOTE — PT/OT/SLP PROGRESS
Physical Therapy      Patient Name:  Miguel Moore   MRN:  [id number]    Patient not seen today secondary to Patient unwilling to participate despite max education and encouragements x 2 attempts (1202 PM and 1525 PM ). Will follow-up tomorrow .

## 2024-03-26 NOTE — SUBJECTIVE & OBJECTIVE
Interval History: not feeling well today, nauseated and feeling weak. He reports his right foot is feeling better at this time    Review of Systems  Objective:     Vital Signs (Most Recent):  Temp: 98.8 °F (37.1 °C) (03/26/24 1132)  Pulse: 81 (03/26/24 1442)  Resp: 18 (03/26/24 1132)  BP: (!) 113/58 (03/26/24 1132)  SpO2: (!) 94 % (03/26/24 1132) Vital Signs (24h Range):  Temp:  [97.4 °F (36.3 °C)-99.7 °F (37.6 °C)] 98.8 °F (37.1 °C)  Pulse:  [81-93] 81  Resp:  [14-19] 18  SpO2:  [86 %-97 %] 94 %  BP: (102-135)/(51-69) 113/58     Weight: 83.5 kg (184 lb 1.4 oz)  Body mass index is 27.99 kg/m².    Intake/Output Summary (Last 24 hours) at 3/26/2024 1524  Last data filed at 3/26/2024 1220  Gross per 24 hour   Intake 600 ml   Output --   Net 600 ml         Physical Exam  Vitals and nursing note reviewed.   Constitutional:       General: He is not in acute distress.     Appearance: He is ill-appearing.   Cardiovascular:      Rate and Rhythm: Normal rate.      Pulses: Normal pulses.   Pulmonary:      Effort: Pulmonary effort is normal. No respiratory distress.      Breath sounds: No wheezing.   Abdominal:      General: Bowel sounds are normal.   Musculoskeletal:         General: Swelling present.   Skin:     General: Skin is warm and dry.   Neurological:      Mental Status: He is alert. Mental status is at baseline.   Psychiatric:         Mood and Affect: Mood normal.         Thought Content: Thought content normal.             Significant Labs: All pertinent labs within the past 24 hours have been reviewed.    Significant Imaging: I have reviewed all pertinent imaging results/findings within the past 24 hours.

## 2024-03-26 NOTE — PROGRESS NOTES
Vancomycin consult follow-up:    Patient reviewed, dialysis scheduled every Mon, Wed, Fri, no new levels, continue current therapy; Next levels due: random due 3/27/2024 at 0400

## 2024-03-26 NOTE — ASSESSMENT & PLAN NOTE
Patient has Paroxysmal (<7 days) atrial fibrillation which is uncontrolled. Patient is currently in atrial fibrillation.  - new onset Afib  - on labetalol for BP at home, eliquis for chronic DVT but did miss some doses of eliquis  - in ED given diltiazem gtt but that caused hypotension  - EKG with Afib RVR with normal Qtc  - started amiodarone with conversion to NSR. Changed to PO amiodarone  - however, he had recurrent Afib and was placed back on amio gtt.   - Cardiology consulted  - plan for SONY/DCCV on 3/22/24  - continue home eliquis    Lab Results   Component Value Date    TSH 2.672 03/20/2024     - TTE normal EF, mild LAE    [id number] - VASc score:  Congestive Heart Failure or EF < 35% NO   Hypertension YES  +1   Age >= 75 NO   Diabetes Mellitus YES  +1   Stroke/TIA prior history YES  +2   Vascular disease (PAD, MI or Aortic Plaque)? YES  +1   Age 65 - 74 YES  +1   Female NO   Total 6   Sinus at this time.

## 2024-03-26 NOTE — PROGRESS NOTES
Date of Admission:3/20/2024    SUBJECTIVE:noted bp dropped and hr went up on hd    Current Facility-Administered Medications   Medication    acetaminophen tablet 650 mg    amiodarone tablet 400 mg    apixaban tablet 5 mg    atorvastatin tablet 80 mg    cefTRIAXone (ROCEPHIN) 2 g in dextrose 5 % in water (D5W) 100 mL IVPB (MB+)    colchicine capsule/tablet 0.6 mg    dextrose 10% bolus 125 mL 125 mL    dextrose 10% bolus 250 mL 250 mL    finasteride tablet 5 mg    glucagon (human recombinant) injection 1 mg    glucose chewable tablet 16 g    glucose chewable tablet 24 g    ibuprofen tablet 200 mg    insulin aspart U-100 pen 0-5 Units    melatonin tablet 6 mg    midodrine tablet 10 mg    mupirocin 2 % ointment    naloxone 0.4 mg/mL injection 0.02 mg    ondansetron injection 4 mg    prochlorperazine injection Soln 5 mg    senna-docusate 8.6-50 mg per tablet 1 tablet    sevelamer carbonate tablet 800 mg    sodium chloride 0.9% bolus 250 mL 250 mL    sodium chloride 0.9% flush 10 mL    tamsulosin 24 hr capsule 0.8 mg    vancomycin - pharmacy to dose    vitamin renal formula (B-complex-vitamin c-folic acid) 1 mg per capsule 1 capsule       Wt Readings from Last 3 Encounters:   03/22/24 83.5 kg (184 lb 1.4 oz)   03/07/24 79.8 kg (175 lb 14.8 oz)   02/27/24 79.8 kg (175 lb 14.8 oz)     Temp Readings from Last 3 Encounters:   03/25/24 99.5 °F (37.5 °C) (Oral)   02/06/24 98 °F (36.7 °C) (Oral)   02/06/24 98.1 °F (36.7 °C) (Oral)     BP Readings from Last 3 Encounters:   03/25/24 (!) 125/58   03/07/24 129/65   02/06/24 110/72     Pulse Readings from Last 3 Encounters:   03/25/24 86   02/06/24 80   02/06/24 80       Intake/Output Summary (Last 24 hours) at 3/25/2024 2046  Last data filed at 3/25/2024 1748  Gross per 24 hour   Intake 990 ml   Output 500 ml   Net 490 ml         PE:  GEN:wd male in nad  HEENT:ncat,eomi,mm  CVS:irregular  PULM:ctab  ABD:bs,soft  EXT: foot boots, avf arm left  NEURO:awake,alert    Recent Labs   Lab  03/25/24  0350   *   *   K 4.2   CL 97   CO2 21*   BUN 49*   CREATININE 10.6*   CALCIUM 8.6*       Lab Results   Component Value Date    .0 (H) 01/08/2021    CALCIUM 8.6 (L) 03/25/2024    PHOS 2.1 (L) 03/25/2024       Recent Labs   Lab 03/25/24  0350   WBC 13.76*   RBC 3.89*   HGB 10.1*   HCT 26.1*   *   MCV 67*   MCH 26.0*   MCHC 38.7*             A/P:  1.esrd. cont hd MWF. HD [facility]. Seen on hd .  2.htn. sbp went ok. Decrease uf goal.  3.paf. hr ok. Following.  4.cellulitis. cont tx.  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 Cont epo.  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 Phos low. Stop binders.

## 2024-03-26 NOTE — PROGRESS NOTES
[facility] Medicine  Progress Note    Patient Name: Miguel Moore  MRN: [id number]  Patient Class: IP- Inpatient   Admission Date: 3/20/2024  Length of Stay: 6 days  Attending Physician: Bonifacio Reyes MD  Primary Care Provider: Raciel Raymond MD        Subjective:     Principal Problem:Paroxysmal atrial fibrillation with RVR        HPI:  Mr Miguel Moore is a 68 y.o. man with ESRD on HD, HTN, DM with neuropathy, history of CVA with residual R sided weakness, chronic DVT on eliquis who presented with feeling poorly. He attended his usual dialysis session on Monday 3/18 without issues. He developed fatigue and R ankle swelling. He has some pain with palpation but is able to walk. No reports of trauma. No wounds. No falls. No history of gout. Today he was feeling worse so did not go to dialysis and came to the ED. No fevers. No shortness of breath. Normal appetite. No nausea, vomiting. No chest pain. No palpitations.     In the ED, afebrile with HR initially controlled then to 130s Afib RVR. Started diltiazem but became hypotensive. Diltiazem stopped. Given antibiotics. Admitted for further management.     Overview/Hospital Course:  Mr Miguel Moore is a 68 y.o. man with ESRD on HD, DM who was admitted with new onset Afib RVR, hyperkalemia from missed HD session, and R ankle cellulitis. Started amio gtt with conversion to NSR. TTE EF 55-60%, mild LAE. Cardiology consulted. Now on PO amiodarone and continues home eliquis (on this for chronic DVT). He received HD with resolution of hyperkalemia. He is on CTX for his R ankle cellulitis and was given colchicine in case gout could contribute. Also added vanc,foot swelling is improving.This is improving. He developed recurrent Afib and amio gtt resumed. Cardiology was planning SONY/DCCV on 3/22. But converted to sinus,  Added vanc for foot cellulitis, and PT,OT ,recommending SNF,consulted SW.    Interval History: not feeling well today, nauseated  and feeling weak. He reports his right foot is feeling better at this time    Review of Systems  Objective:     Vital Signs (Most Recent):  Temp: 98.8 °F (37.1 °C) (03/26/24 1132)  Pulse: 81 (03/26/24 1442)  Resp: 18 (03/26/24 1132)  BP: (!) 113/58 (03/26/24 1132)  SpO2: (!) 94 % (03/26/24 1132) Vital Signs (24h Range):  Temp:  [97.4 °F (36.3 °C)-99.7 °F (37.6 °C)] 98.8 °F (37.1 °C)  Pulse:  [81-93] 81  Resp:  [14-19] 18  SpO2:  [86 %-97 %] 94 %  BP: (102-135)/(51-69) 113/58     Weight: 83.5 kg (184 lb 1.4 oz)  Body mass index is 27.99 kg/m².    Intake/Output Summary (Last 24 hours) at 3/26/2024 1524  Last data filed at 3/26/2024 1220  Gross per 24 hour   Intake 600 ml   Output --   Net 600 ml         Physical Exam  Vitals and nursing note reviewed.   Constitutional:       General: He is not in acute distress.     Appearance: He is ill-appearing.   Cardiovascular:      Rate and Rhythm: Normal rate.      Pulses: Normal pulses.   Pulmonary:      Effort: Pulmonary effort is normal. No respiratory distress.      Breath sounds: No wheezing.   Abdominal:      General: Bowel sounds are normal.   Musculoskeletal:         General: Swelling present.   Skin:     General: Skin is warm and dry.   Neurological:      Mental Status: He is alert. Mental status is at baseline.   Psychiatric:         Mood and Affect: Mood normal.         Thought Content: Thought content normal.             Significant Labs: All pertinent labs within the past 24 hours have been reviewed.    Significant Imaging: I have reviewed all pertinent imaging results/findings within the past 24 hours.    Assessment/Plan:      * Paroxysmal atrial fibrillation with RVR  Patient has Paroxysmal (<7 days) atrial fibrillation which is uncontrolled. Patient is currently in atrial fibrillation.  - new onset Afib  - on labetalol for BP at home, eliquis for chronic DVT but did miss some doses of eliquis  - in ED given diltiazem gtt but that caused hypotension  - EKG with  Afib RVR with normal Qtc  - started amiodarone with conversion to NSR. Changed to PO amiodarone  - however, he had recurrent Afib and was placed back on amio gtt.   - Cardiology consulted  - plan for SONY/DCCV on 3/22/24  - continue home eliquis    Lab Results   Component Value Date    TSH 2.672 03/20/2024     - TTE normal EF, mild LAE    [id number] - VASc score:  Congestive Heart Failure or EF < 35% NO   Hypertension YES  +1   Age >= 75 NO   Diabetes Mellitus YES  +1   Stroke/TIA prior history YES  +2   Vascular disease (PAD, MI or Aortic Plaque)? YES  +1   Age 65 - 74 YES  +1   Female NO   Total 6   Sinus at this time.      Cellulitis of right foot  R ankle pain, swelling, warmth present. Potential gout vs cellulitis. 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 No fracture present. Uric acid normal. Arterial US and venous US with no clots, appropriate flow  - continue CTX for suspected cellulitis  - colchicine x1 dose again today in case gout contributes  - blood cultures currently NGTD  - on 3/22, pain is improving, warmth and hyperpigmentation are decreasing. Still has swelling  - PT, OT consults after cardioversion  - vanc added - on day #6 of IV antibiotics. Plan to complete 7 days.  - Colchicine started for possible gout flare      Anemia in chronic kidney disease  Patient's anemia is currently controlled. Has not received any PRBCs to date. Etiology likely d/t chronic disease due to ESRD  Current CBC reviewed-   Lab Results   Component Value Date    HGB 10.1 (L) 03/25/2024    HCT 26.1 (L) 03/25/2024     Monitor serial CBC and transfuse if patient becomes hemodynamically unstable, symptomatic or H/H drops below 7/21.    History of stroke  pT,OT.      Secondary hyperparathyroidism of renal origin  Continue renvela       ESRD (end stage renal disease)  ESRD via LUE AVF. Last session 3/18/24. Missed 3/20/24 due to fatigue. Usually MWF  - hyperkalemic on admit. Does not appear volume  overloaded.   - Nephrology Dr Currie consulted. Received HD on 3/20. K normalized.   - continue MWF HD    Hemiplegia and hemiparesis following cerebral infarction affecting right dominant side  No signs of acute stroke. Does have RUE weakness.   - continue home asa, eliquis, statin      Seizure disorder as sequela of cerebrovascular accident  Not currently on antiepileptics. No seizure activity reported. Monitor.       Controlled type 2 diabetes mellitus with chronic kidney disease on chronic dialysis, with long-term current use of insulin  A1c:   Lab Results   Component Value Date    HGBA1C 5.8 (H) 03/20/2024     Meds: SSI PRN to maintain goal 140-180  ADA renal diet, accuchecks, hypoglycemic protocol      Dyslipidemia  Continue statin      Benign essential HTN  Chronic. Latest blood pressure and vitals reviewed-     Temp:  [97.4 °F (36.3 °C)-99.7 °F (37.6 °C)]   Pulse:  [81-93]   Resp:  [14-19]   BP: (102-135)/(51-69)   SpO2:  [86 %-97 %] .   Home meds for hypertension were reviewed and noted below.   Hypertension Medications               amLODIPine (NORVASC) 10 MG tablet Take 1 tablet by mouth once daily    labetaloL (NORMODYNE) 100 MG tablet Take 1 tablet (100 mg total) by mouth once daily.          - BP Rx held on admit due to hypotension  - BP improved. Resumed home Rx    Will utilize p.r.n. blood pressure medication only if patient's blood pressure greater than 180/110 and he develops symptoms such as worsening chest pain or shortness of breath.      VTE Risk Mitigation (From admission, onward)           Ordered     apixaban tablet 5 mg  2 times daily         03/20/24 1708                    Discharge Planning   GARY:      Code Status: Full Code   Is the patient medically ready for discharge?:     Reason for patient still in hospital (select all that apply): Treatment  Discharge Plan A: Skilled Nursing Facility   Discharge Delays: None known at this time              Bonifacio Reyes MD  Department of Hospital  [facility]

## 2024-03-26 NOTE — NURSING
[facility]  Nurses Note -- 4 Eyes      3/25/2024       Skin assessed on: Q Shift      [] No Pressure Injuries Present    []Prevention Measures Documented    [x] Yes LDA  for Pressure Injury Previously documented     [] Yes New Pressure Injury Discovered   [] LDA for New Pressure Injury Added      Attending RN:  Aman Feliz RN     Second RN:  DARRION Domingo

## 2024-03-26 NOTE — NURSING
Pt o2 sat dropped to 87% on RA. No SOB noted. Put it to 1L NC with latest o2 sat of 97%. Claudia YOUNG notified. Will continue to monitor.

## 2024-03-26 NOTE — PLAN OF CARE
Problem: Adult Inpatient Plan of Care  Goal: Plan of Care Review  Outcome: Ongoing, Progressing  Flowsheets (Taken 3/26/2024 0512)  Plan of Care Reviewed With: patient  Goal: Optimal Comfort and Wellbeing  Outcome: Ongoing, Progressing  Intervention: Monitor Pain and Promote Comfort  Flowsheets (Taken 3/26/2024 0512)  Pain Management Interventions:   position adjusted   pillow support provided   quiet environment facilitated     Problem: Diabetes Comorbidity  Goal: Blood Glucose Level Within Targeted Range  Outcome: Ongoing, Progressing  Intervention: Monitor and Manage Glycemia  Flowsheets (Taken 3/26/2024 0512)  Glycemic Management: blood glucose monitored

## 2024-03-26 NOTE — ASSESSMENT & PLAN NOTE
Patient's anemia is currently controlled. Has not received any PRBCs to date. Etiology likely d/t chronic disease due to ESRD  Current CBC reviewed-   Lab Results   Component Value Date    HGB 10.1 (L) 03/25/2024    HCT 26.1 (L) 03/25/2024     Monitor serial CBC and transfuse if patient becomes hemodynamically unstable, symptomatic or H/H drops below 7/21.
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
## 2024-03-26 NOTE — PLAN OF CARE
Problem: Adult Inpatient Plan of Care  Goal: Plan of Care Review  Outcome: Ongoing, Progressing  Flowsheets (Taken 3/26/2024 0815)  Plan of Care Reviewed With: patient  Goal: Patient-Specific Goal (Individualized)  Outcome: Ongoing, Progressing     Problem: Diabetes Comorbidity  Goal: Blood Glucose Level Within Targeted Range  Outcome: Ongoing, Progressing  Intervention: Monitor and Manage Glycemia  Flowsheets (Taken 3/26/2024 0815)  Glycemic Management:   blood glucose monitored   carbohydrate replacement provided   supplemental insulin given     Problem: Adult Inpatient Plan of Care  Goal: Plan of Care Review  Outcome: Ongoing, Progressing  Flowsheets (Taken 3/26/2024 0815)  Plan of Care Reviewed With: patient     Problem: Adult Inpatient Plan of Care  Goal: Plan of Care Review  Outcome: Ongoing, Progressing  Flowsheets (Taken 3/26/2024 0815)  Plan of Care Reviewed With: patient     Problem: Adult Inpatient Plan of Care  Goal: Patient-Specific Goal (Individualized)  Outcome: Ongoing, Progressing     Problem: Adult Inpatient Plan of Care  Goal: Patient-Specific Goal (Individualized)  Outcome: Ongoing, Progressing     Problem: Diabetes Comorbidity  Goal: Blood Glucose Level Within Targeted Range  Outcome: Ongoing, Progressing  Intervention: Monitor and Manage Glycemia  Flowsheets (Taken 3/26/2024 0815)  Glycemic Management:   blood glucose monitored   carbohydrate replacement provided   supplemental insulin given     Problem: Diabetes Comorbidity  Goal: Blood Glucose Level Within Targeted Range  Outcome: Ongoing, Progressing     Problem: Diabetes Comorbidity  Goal: Blood Glucose Level Within Targeted Range  Intervention: Monitor and Manage Glycemia  Flowsheets (Taken 3/26/2024 0815)  Glycemic Management:   blood glucose monitored   carbohydrate replacement provided   supplemental insulin given     Problem: Diabetes Comorbidity  Goal: Blood Glucose Level Within Targeted Range  Intervention: Monitor and Manage  Glycemia  Flowsheets (Taken 3/26/2024 0815)  Glycemic Management:   blood glucose monitored   carbohydrate replacement provided   supplemental insulin given

## 2024-03-26 NOTE — ASSESSMENT & PLAN NOTE
Chronic. Latest blood pressure and vitals reviewed-     Temp:  [97.4 °F (36.3 °C)-99.7 °F (37.6 °C)]   Pulse:  [81-93]   Resp:  [14-19]   BP: (102-135)/(51-69)   SpO2:  [86 %-97 %] .   Home meds for hypertension were reviewed and noted below.   Hypertension Medications               amLODIPine (NORVASC) 10 MG tablet Take 1 tablet by mouth once daily    labetaloL (NORMODYNE) 100 MG tablet Take 1 tablet (100 mg total) by mouth once daily.          - BP Rx held on admit due to hypotension  - BP improved. Resumed home Rx    Will utilize p.r.n. blood pressure medication only if patient's blood pressure greater than 180/110 and he develops symptoms such as worsening chest pain or shortness of breath.

## 2024-03-27 LAB
ANION GAP SERPL CALC-SCNC: 9 MMOL/L (ref 8–16)
BACTERIA BLD CULT: NORMAL
BACTERIA BLD CULT: NORMAL
BASOPHILS # BLD AUTO: 0.02 K/UL (ref 0–0.2)
BASOPHILS NFR BLD: 0.1 % (ref 0–1.9)
BUN SERPL-MCNC: 39 MG/DL (ref 8–23)
CALCIUM SERPL-MCNC: 7.5 MG/DL (ref 8.7–10.5)
CHLORIDE SERPL-SCNC: 100 MMOL/L (ref 95–110)
CO2 SERPL-SCNC: 25 MMOL/L (ref 23–29)
CREAT SERPL-MCNC: 8.3 MG/DL (ref 0.5–1.4)
DIFFERENTIAL METHOD BLD: ABNORMAL
EOSINOPHIL # BLD AUTO: 0.1 K/UL (ref 0–0.5)
EOSINOPHIL NFR BLD: 0.3 % (ref 0–8)
ERYTHROCYTE [DISTWIDTH] IN BLOOD BY AUTOMATED COUNT: 16 % (ref 11.5–14.5)
EST. GFR  (NO RACE VARIABLE): 6 ML/MIN/1.73 M^2
GLUCOSE SERPL-MCNC: 149 MG/DL (ref 70–110)
HCT VFR BLD AUTO: 25.1 % (ref 40–54)
HGB BLD-MCNC: 9.7 G/DL (ref 14–18)
IMM GRANULOCYTES # BLD AUTO: 0.25 K/UL (ref 0–0.04)
IMM GRANULOCYTES NFR BLD AUTO: 1.7 % (ref 0–0.5)
LYMPHOCYTES # BLD AUTO: 0.5 K/UL (ref 1–4.8)
LYMPHOCYTES NFR BLD: 3.6 % (ref 18–48)
MCH RBC QN AUTO: 26.1 PG (ref 27–31)
MCHC RBC AUTO-ENTMCNC: 38.6 G/DL (ref 32–36)
MCV RBC AUTO: 68 FL (ref 82–98)
MONOCYTES # BLD AUTO: 0.9 K/UL (ref 0.3–1)
MONOCYTES NFR BLD: 6.4 % (ref 4–15)
NEUTROPHILS # BLD AUTO: 12.6 K/UL (ref 1.8–7.7)
NEUTROPHILS NFR BLD: 87.9 % (ref 38–73)
NRBC BLD-RTO: 0 /100 WBC
PHOSPHATE SERPL-MCNC: 1.4 MG/DL (ref 2.7–4.5)
PLATELET # BLD AUTO: 210 K/UL (ref 150–450)
PLATELET BLD QL SMEAR: ABNORMAL
PMV BLD AUTO: 10.5 FL (ref 9.2–12.9)
POCT GLUCOSE: 148 MG/DL (ref 70–110)
POCT GLUCOSE: 177 MG/DL (ref 70–110)
POCT GLUCOSE: 193 MG/DL (ref 70–110)
POTASSIUM SERPL-SCNC: 4.1 MMOL/L (ref 3.5–5.1)
RBC # BLD AUTO: 3.71 M/UL (ref 4.6–6.2)
SODIUM SERPL-SCNC: 134 MMOL/L (ref 136–145)
VANCOMYCIN SERPL-MCNC: 14.8 UG/ML
WBC # BLD AUTO: 14.3 K/UL (ref 3.9–12.7)

## 2024-03-27 PROCEDURE — 25000003 PHARM REV CODE 250: Mod: HCNC | Performed by: HOSPITALIST

## 2024-03-27 PROCEDURE — 25000003 PHARM REV CODE 250: Mod: HCNC | Performed by: INTERNAL MEDICINE

## 2024-03-27 PROCEDURE — 84100 ASSAY OF PHOSPHORUS: CPT | Mod: HCNC | Performed by: HOSPITALIST

## 2024-03-27 PROCEDURE — 80202 ASSAY OF VANCOMYCIN: CPT | Mod: HCNC | Performed by: HOSPITALIST

## 2024-03-27 PROCEDURE — 87040 BLOOD CULTURE FOR BACTERIA: CPT | Mod: HCNC | Performed by: STUDENT IN AN ORGANIZED HEALTH CARE EDUCATION/TRAINING PROGRAM

## 2024-03-27 PROCEDURE — 85025 COMPLETE CBC W/AUTO DIFF WBC: CPT | Mod: HCNC | Performed by: HOSPITALIST

## 2024-03-27 PROCEDURE — 36415 COLL VENOUS BLD VENIPUNCTURE: CPT | Mod: HCNC | Performed by: HOSPITALIST

## 2024-03-27 PROCEDURE — 80048 BASIC METABOLIC PNL TOTAL CA: CPT | Mod: HCNC | Performed by: HOSPITALIST

## 2024-03-27 PROCEDURE — 21400001 HC TELEMETRY ROOM: Mod: HCNC

## 2024-03-27 PROCEDURE — A4216 STERILE WATER/SALINE, 10 ML: HCPCS | Mod: HCNC | Performed by: HOSPITALIST

## 2024-03-27 PROCEDURE — 25000003 PHARM REV CODE 250: Mod: HCNC | Performed by: STUDENT IN AN ORGANIZED HEALTH CARE EDUCATION/TRAINING PROGRAM

## 2024-03-27 PROCEDURE — 63600175 PHARM REV CODE 636 W HCPCS: Mod: HCNC | Performed by: STUDENT IN AN ORGANIZED HEALTH CARE EDUCATION/TRAINING PROGRAM

## 2024-03-27 RX ORDER — SODIUM,POTASSIUM PHOSPHATES 280-250MG
1 POWDER IN PACKET (EA) ORAL ONCE
Status: COMPLETED | OUTPATIENT
Start: 2024-03-27 | End: 2024-03-27

## 2024-03-27 RX ADMIN — MEROPENEM 1 G: 1 INJECTION INTRAVENOUS at 05:03

## 2024-03-27 RX ADMIN — Medication 10 ML: at 05:03

## 2024-03-27 RX ADMIN — DIPHENOXYLATE HYDROCHLORIDE AND ATROPINE SULFATE 1 TABLET: 2.5; .025 TABLET ORAL at 12:03

## 2024-03-27 RX ADMIN — AMIODARONE HYDROCHLORIDE 400 MG: 200 TABLET ORAL at 08:03

## 2024-03-27 RX ADMIN — APIXABAN 5 MG: 5 TABLET, FILM COATED ORAL at 08:03

## 2024-03-27 RX ADMIN — SODIUM PHOSPHATE, MONOBASIC, MONOHYDRATE AND SODIUM PHOSPHATE, DIBASIC, ANHYDROUS 20.01 MMOL: 142; 276 INJECTION, SOLUTION INTRAVENOUS at 02:03

## 2024-03-27 RX ADMIN — NEPHROCAP 1 CAPSULE: 1 CAP ORAL at 08:03

## 2024-03-27 RX ADMIN — ACETAMINOPHEN 650 MG: 325 TABLET ORAL at 04:03

## 2024-03-27 RX ADMIN — MIDODRINE HYDROCHLORIDE 10 MG: 5 TABLET ORAL at 08:03

## 2024-03-27 RX ADMIN — VANCOMYCIN HYDROCHLORIDE 500 MG: 500 INJECTION, POWDER, LYOPHILIZED, FOR SOLUTION INTRAVENOUS at 02:03

## 2024-03-27 RX ADMIN — MIDODRINE HYDROCHLORIDE 10 MG: 5 TABLET ORAL at 02:03

## 2024-03-27 RX ADMIN — FINASTERIDE 5 MG: 5 TABLET, FILM COATED ORAL at 08:03

## 2024-03-27 RX ADMIN — APIXABAN 5 MG: 5 TABLET, FILM COATED ORAL at 10:03

## 2024-03-27 RX ADMIN — ATORVASTATIN CALCIUM 80 MG: 40 TABLET, FILM COATED ORAL at 08:03

## 2024-03-27 RX ADMIN — TAMSULOSIN HYDROCHLORIDE 0.8 MG: 0.4 CAPSULE ORAL at 08:03

## 2024-03-27 RX ADMIN — DOCUSATE SODIUM AND SENNOSIDES 1 TABLET: 8.6; 5 TABLET ORAL at 10:03

## 2024-03-27 RX ADMIN — AMIODARONE HYDROCHLORIDE 400 MG: 200 TABLET ORAL at 10:03

## 2024-03-27 RX ADMIN — MIDODRINE HYDROCHLORIDE 10 MG: 5 TABLET ORAL at 10:03

## 2024-03-27 RX ADMIN — Medication 1 PACKET: at 01:03

## 2024-03-27 RX ADMIN — Medication 10 ML: at 02:03

## 2024-03-27 NOTE — PROGRESS NOTES
Pharmacokinetic Assessment Follow Up: IV Vancomycin    Vancomycin serum concentration assessment(s):    The random level was drawn correctly and can be used to guide therapy at this time. The measurement is within the desired definitive target range of 10 to 20 mcg/mL.    Vancomycin Regimen Plan:    Give 500 mg after dialysis today.  Re-dose when the random level is less than 20 mcg/mL, next level to be drawn at 0400 on 3/29/2024    Drug levels (last 3 results):  Recent Labs   Lab Result Units 03/25/24  0350 03/27/24  0345   Vancomycin, Random ug/mL 15.1 14.8       Pharmacy will continue to follow and monitor vancomycin.    Please contact pharmacy at extension [phone] for questions regarding this assessment.    Thank you for the consult,   Jakob Goldberg Jr       Patient brief summary:  Miguel Moore is a 68 y.o. male initiated on antimicrobial therapy with IV Vancomycin for treatment of skin & soft tissue infection    Drug Allergies:   Review of patient's allergies indicates:   Allergen Reactions    Ace inhibitors      Hyperkalemia 8/2018  Other reaction(s): Unknown    Penicillins Hives    Tizanidine      Felt hot       Actual Body Weight:   83.5 kg    Renal Function:   Estimated Creatinine Clearance: 8.8 mL/min (A) (based on SCr of 8.3 mg/dL (H)).,     Dialysis Method (if applicable):  intermittent HD    CBC (last 72 hours):  Recent Labs   Lab Result Units 03/25/24  0350 03/27/24  0345   WBC K/uL 13.76* 14.30*   Hemoglobin g/dL 10.1* 9.7*   Hematocrit % 26.1* 25.1*   Platelets K/uL 148* 210   Gran % % 89.0* 87.9*   Lymph % % 3.3* 3.6*   Mono % % 6.5 6.4   Eosinophil % % 0.1 0.3   Basophil % % 0.2 0.1   Differential Method  Automated Automated       Metabolic Panel (last 72 hours):  Recent Labs   Lab Result Units 03/25/24  0350 03/27/24  0345   Sodium mmol/L 131* 134*   Potassium mmol/L 4.2 4.1   Chloride mmol/L 97 100   CO2 mmol/L 21* 25   Glucose mg/dL 136* 149*   BUN mg/dL 49* 39*   Creatinine mg/dL 10.6*  8.3*   Phosphorus mg/dL 2.1* 1.4*       Vancomycin Administrations:  vancomycin given in the last 96 hours                     vancomycin (VANCOCIN) 500 mg in dextrose 5 % in water (D5W) 100 mL IVPB (MB+) (mg) 500 mg New Bag 03/25/24 1658    vancomycin (VANCOCIN) 1,000 mg in dextrose 5 % (D5W) 250 mL IVPB (Vial-Mate) (mg) 1,000 mg New Bag 03/23/24 1337                    Microbiologic Results:  Microbiology Results (last 7 days)       Procedure Component Value Units Date/Time    Blood culture [[id number]] Collected: 03/23/24 1615    Order Status: Completed Specimen: Blood Updated: 03/26/24 1703     Blood Culture, Routine No Growth to date      No Growth to date      No Growth to date      No Growth to date    Blood culture [[id number]] Collected: 03/23/24 1614    Order Status: Completed Specimen: Blood from Peripheral, Hand, Left Updated: 03/26/24 1703     Blood Culture, Routine No Growth to date      No Growth to date      No Growth to date      No Growth to date    Blood Culture #2 **CANNOT BE ORDERED STAT** [[id number]] Collected: 03/20/24 1450    Order Status: Completed Specimen: Blood from Peripheral, Hand, Right Updated: 03/24/24 1703     Blood Culture, Routine No Growth after 4 days.    Blood Culture #1 **CANNOT BE ORDERED STAT** [[id number]] Collected: 03/20/24 1449    Order Status: Completed Specimen: Blood from Peripheral, Hand, Right Updated: 03/24/24 1703     Blood Culture, Routine No Growth after 4 days.

## 2024-03-27 NOTE — PLAN OF CARE
Problem: Adult Inpatient Plan of Care  Goal: Plan of Care Review  Outcome: Ongoing, Progressing  Flowsheets (Taken 3/27/2024 0815)  Plan of Care Reviewed With: patient  Goal: Patient-Specific Goal (Individualized)  Outcome: Ongoing, Progressing     Problem: Diabetes Comorbidity  Goal: Blood Glucose Level Within Targeted Range  Outcome: Ongoing, Progressing  Intervention: Monitor and Manage Glycemia  Flowsheets (Taken 3/27/2024 0815)  Glycemic Management:   blood glucose monitored   carbohydrate replacement provided   supplemental insulin given     Problem: Adult Inpatient Plan of Care  Goal: Plan of Care Review  Outcome: Ongoing, Progressing  Flowsheets (Taken 3/27/2024 0815)  Plan of Care Reviewed With: patient     Problem: Adult Inpatient Plan of Care  Goal: Plan of Care Review  Outcome: Ongoing, Progressing  Flowsheets (Taken 3/27/2024 0815)  Plan of Care Reviewed With: patient     Problem: Adult Inpatient Plan of Care  Goal: Patient-Specific Goal (Individualized)  Outcome: Ongoing, Progressing     Problem: Adult Inpatient Plan of Care  Goal: Patient-Specific Goal (Individualized)  Outcome: Ongoing, Progressing     Problem: Diabetes Comorbidity  Goal: Blood Glucose Level Within Targeted Range  Outcome: Ongoing, Progressing  Intervention: Monitor and Manage Glycemia  Flowsheets (Taken 3/27/2024 0815)  Glycemic Management:   blood glucose monitored   carbohydrate replacement provided   supplemental insulin given     Problem: Diabetes Comorbidity  Goal: Blood Glucose Level Within Targeted Range  Outcome: Ongoing, Progressing     Problem: Diabetes Comorbidity  Goal: Blood Glucose Level Within Targeted Range  Intervention: Monitor and Manage Glycemia  Flowsheets (Taken 3/27/2024 0815)  Glycemic Management:   blood glucose monitored   carbohydrate replacement provided   supplemental insulin given     Problem: Diabetes Comorbidity  Goal: Blood Glucose Level Within Targeted Range  Intervention: Monitor and Manage  Glycemia  Flowsheets (Taken 3/27/2024 0815)  Glycemic Management:   blood glucose monitored   carbohydrate replacement provided   supplemental insulin given

## 2024-03-27 NOTE — PLAN OF CARE
Problem: Adult Inpatient Plan of Care  Goal: Plan of Care Review  Outcome: Ongoing, Progressing  Flowsheets (Taken 3/27/2024 0556)  Plan of Care Reviewed With: patient  Goal: Optimal Comfort and Wellbeing  Outcome: Ongoing, Progressing  Intervention: Monitor Pain and Promote Comfort  Flowsheets (Taken 3/27/2024 0556)  Pain Management Interventions:   pillow support provided   quiet environment facilitated   position adjusted     Problem: Diabetes Comorbidity  Goal: Blood Glucose Level Within Targeted Range  Outcome: Ongoing, Progressing  Intervention: Monitor and Manage Glycemia  Flowsheets (Taken 3/27/2024 0556)  Glycemic Management: blood glucose monitored

## 2024-03-27 NOTE — ASSESSMENT & PLAN NOTE
R ankle pain, swelling, warmth present. Potential gout vs cellulitis. 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 No fracture present. Uric acid normal. Arterial US and venous US with no clots, appropriate flow  - continue CTX for suspected cellulitis  - colchicine x1 dose again today in case gout contributes  - blood cultures currently NGTD  - on 3/22, pain is improving, warmth and hyperpigmentation are decreasing. Still has swelling  - PT, OT consults after cardioversion  - vanc added - on day #7 of IV antibiotics. Plan to complete 7 days.  - Colchicine started for possible gout flare but suspect more cellulitis. This has been stopped and now monitoring.

## 2024-03-27 NOTE — PLAN OF CARE
Patient refusing SNF. Preference is to home and have Out Patient Physical therapy.  TN talked with daughter as well and expressed her father's choices.       Pre-Monthly Visit    Referring PCP; Mega Woodson MD   My Supervising physician is Natahniel Wells MD. I have his schedule on file at the office.     History Of Present Illness  uKnal is a 65 year old male presenting for Pre-monthly visit   for wellness and weight management for sleeve gastrectomy surgery 35178 and Medical weight loss management.      Fluids: water >64oz, black coffee, water with lemon/pranav/cinnamon sometimes     Diet: When you are going out to eat for dinner, make sure you focus on lean protein and non-starchy veggies  Don't skip lunch - protein powder shake or tuna packet with carrots and hummus or cottage cheese or hard boiled eggs  Don't skip meals. Work on structure throughout the day, have a plan for the next day. Meal prepping can help with this!    Exercise/activity: Kunal is back int the pool walking half a mile 3 days a week. After that he does 2 miles on a bike and then does chest arms and abs one day and the other day does back shoulders and abs. Using mostly machines for the strength training. Patient working with knee injury and  currently. This has been an ongoing process since .     Sleep: okay states gets awoken in the middle of the night sometimes     Psychological wellness: doing well patient states that he wanted to lose more weight this past month     Topiramate:  Does not feel any side effects also does not feel like its doing anything     Past Medical History:   Diagnosis Date   • Borderline high cholesterol    • Borderline HTN         Past Surgical History:   Procedure Laterality Date   • Shoulder surg proc unlisted Right         Social History     Tobacco Use   • Smoking status: Former     Current packs/day: 0.00     Average packs/day: 1 pack/day for 20.0 years (20.0 pk-yrs)     Types: Cigarettes, Cigars     Start date:      Quit date: 2016     Years since quittin.4     Passive exposure: Never   • Smokeless tobacco: Never   Vaping Use   • Vaping Use:  never used   Substance Use Topics   • Alcohol use: Not Currently   • Drug use: Not Currently     Comment: In past         Family History   Problem Relation Age of Onset   • Patient is unaware of any medical problems Mother    • Emphysema Father    • Substance Abuse Sister    • Patient is unaware of any medical problems Maternal Grandmother    • Patient is unaware of any medical problems Maternal Grandfather    • Patient is unaware of any medical problems Paternal Grandmother    • Patient is unaware of any medical problems Paternal Grandfather    • Cancer Neg Hx    • Clotting Disorder Neg Hx        ALLERGIES:  Prochlorperazine and Compazine    Current Outpatient Medications   Medication Sig Dispense Refill   • Cyanocobalamin (VITAMIN B 12 PO) Take by mouth daily.     • VITAMIN D PO Take by mouth daily.     • semaglutide-Weight Management (WEGOVY) 0.25 MG/0.5ML injection Inject 0.25 mg into the skin every 7 days. 2 mL 0   • topiramate (TOPAMAX) 25 MG tablet TAKE 1 TABLET BY MOUTH IN THE MORNING AND IN THE EVENING 180 tablet 0   • apixaBAN (ELIQUIS) 5 MG Tab Take 5 mg by mouth in the morning and 5 mg in the evening.     • aspirin 81 MG EC tablet Take 81 mg by mouth daily.     • atorvastatin (LIPITOR) 40 MG tablet Take 40 mg by mouth daily.     • triamterene-hydroCHLOROthiazide (MAXZIDE-25) 37.5-25 MG per tablet Take 1 tablet by mouth daily.     • metoPROLOL tartrate (LOPRESSOR) 25 MG tablet Take 25 mg by mouth in the morning and 25 mg in the evening.     • phentermine (ADIPEX-P) 37.5 MG tablet Take 37.5 mg by mouth every morning.     • losartan (COZAAR) 50 MG tablet Take 50 mg by mouth daily.       No current facility-administered medications for this visit.        Review of Systems   Constitutional: Negative.    Gastrointestinal: Negative.    Musculoskeletal: Negative.    Neurological: Negative.    Psychiatric/Behavioral: Negative.          Patient Reported Vitals         In Person Vitals  Visit Vitals  /75 (BP  Location: LUE - Left upper extremity, Patient Position: Sitting, Cuff Size: Large Adult)   Pulse 71   Temp 97.9 °F (36.6 °C) (Tympanic)   Ht 6' 3\" (1.905 m)   Wt (!) 163.3 kg (360 lb)   SpO2 97%   BMI 45.00 kg/m²        Physical Exam  Constitutional:       Appearance: He is obese.   HENT:      Head: Normocephalic.      Nose: Nose normal.      Neck: Normal range of motion.   Eyes:      Conjunctiva/sclera: Conjunctivae normal.   Pulmonary:      Effort: Pulmonary effort is normal.   Neurological:      Mental Status: He is alert and oriented to person, place, and time. Mental status is at baseline.   Psychiatric:         Mood and Affect: Mood normal.         Behavior: Behavior normal.         Thought Content: Thought content normal.         Judgment: Judgment normal.        LABS  Recent labs reviewed in [facility] on 04/18/2023   Component Date Value Ref Range Status   • Sodium 04/18/2023 136  135 - 145 mmol/L Final   • Potassium 04/18/2023 3.7  3.4 - 5.1 mmol/L Final   • Chloride 04/18/2023 108  97 - 110 mmol/L Final   • Carbon Dioxide 04/18/2023 25  21 - 32 mmol/L Final   • Anion Gap 04/18/2023 7  7 - 19 mmol/L Final   • Glucose 04/18/2023 104 (H)  70 - 99 mg/dL Final   • BUN 04/18/2023 13  6 - 20 mg/dL Final   • Creatinine 04/18/2023 0.98  0.67 - 1.17 mg/dL Final   • Glomerular Filtration Rate 04/18/2023 85  >=60 Final    eGFR results = or >60 mL/min/1.73m2 = Normal kidney function. Estimated GFR calculated using the CKD-EPI-R (2021) equation that does not include race in the creatinine calculation.   • BUN/Cr 04/18/2023 13  7 - 25 Final   • Calcium 04/18/2023 9.2  8.4 - 10.2 mg/dL Final   • Bilirubin, Total 04/18/2023 0.6  0.2 - 1.0 mg/dL Final   • GOT/AST 04/18/2023 17  <=37 Units/L Final   • GPT/ALT 04/18/2023 28  <64 Units/L Final   • Alkaline Phosphatase 04/18/2023 103  45 - 117 Units/L Final   • Albumin 04/18/2023 3.5 (L)  3.6 - 5.1 g/dL Final   • Protein, Total 04/18/2023 7.2  6.4 - 8.2 g/dL Final    • Globulin 04/18/2023 3.7  2.0 - 4.0 g/dL Final   • A/G Ratio 04/18/2023 0.9 (L)  1.0 - 2.4 Final   • Ferritin 04/18/2023 80  26 - 388 ng/mL Final   • Hemoglobin A1C 04/18/2023 5.6  4.5 - 5.6 % Final      Diabetic Screening  Non Diabetic:             <5.7%  Increased Risk:           5.7-6.4%  Diagnostic For Diabetes:  >6.4%    Diabetic Control  A1C%       eAG mg/dL  6.0            126  6.5            140  7.0            154  7.5            169  8.0            183  8.5            197  9.0            212  9.5            226  10.0           240   • Insulin, Fasting 04/18/2023 31 (H)  3 - 28 mUnits/L Final   • Iron 04/18/2023 75  65 - 175 mcg/dL Final   • Iron Binding Capacity 04/18/2023 347  250 - 450 mcg/dL Final   • Iron, Percent Saturation 04/18/2023 22  15 - 45 % Final   • Cholesterol 04/18/2023 146  <=199 mg/dL Final      Desirable         <200  Borderline High   200 to 239  High              >=240   • Triglycerides 04/18/2023 117  <=149 mg/dL Final      Normal            <150  Borderline High   150 to 199  High              200 to 499  Very High         >=500   • HDL 04/18/2023 37 (L)  >=40 mg/dL Final      Low              <40  Borderline Low   40 to 49  Near Optimal     50 to 59  Optimal          >=60   • LDL 04/18/2023 86  <=129 mg/dL Final      Optimal           <100  Near Optimal      100 to 129  Borderline High   130 to 159  High              160 to 189  Very High         >=190   • Non-HDL Cholesterol 04/18/2023 109  mg/dL Final      Therapeutic Target:  CHD and risk equivalents  <130  Multiple risk factors     <160  0 to 1 risk factor        <190   • Cholesterol/ HDL Ratio 04/18/2023 3.9  <=4.4 Final   • PTH, Intact 04/18/2023 35  19 - 88 pg/mL Final   • Phosphorus 04/18/2023 2.9  2.4 - 4.7 mg/dL Final   • TSH 04/18/2023 1.480  0.350 - 5.000 mcUnits/mL Final    Findings most consistent with euthyroid state, no additional testing suggested. TSH may be normal in patients with thyroid dysfunction and  pituitary disease. Clinical correlation recommended.    (Reflex TSH algorithm is not recommended in hospitalized patients. A variety of drugs, as well as serious acute and chronic illnesses may alter thyroid function tests. Commonly implicated drugs include glucocorticoids, dopamine, carbamazepine, iodine, amiodarone, lithium and heparin.)   • Vitamin B1, Whole Blood 04/18/2023 78  70 - 180 nmol/L Final    INTERPRETIVE INFORMATION: Vitamin B1, Whole Blood    This assay measures the concentration of thiamine diphosphate   (TDP), the primary active form of vitamin B1. Approximately 90   percent of vitamin B1 present in whole blood is TDP. Thiamine and   thiamine monophosphate, which comprise the remaining 10 percent,   are not measured.    This test was developed and its performance characteristics   determined by [facility]. It has not been cleared or   approved by the US Food and Drug Administration. This test was   performed in a CLIA certified laboratory and is intended for   clinical purposes.  Performed By: [facility]  [address] [place] [id number]  : Harrison Styles MD, PhD   • Vitamin B12 04/18/2023 329  211 - 911 pg/mL Final   • Folate 04/18/2023 11.3  >=5.5 ng/mL Final   • Vitamin D, 25-Hydroxy 04/18/2023 21.9 (L)  30.0 - 100.0 ng/mL Final    <20 ng/mL  =  Vitamin D deficiency  20-29 ng/mL  =  Vitamin D insufficiency   ng/mL  =  Optimal Vitamin D  >150 ng/mL  =  Possible toxicity   • WBC 04/18/2023 5.6  4.2 - 11.0 K/mcL Final   • RBC 04/18/2023 4.50  4.50 - 5.90 mil/mcL Final   • HGB 04/18/2023 13.6  13.0 - 17.0 g/dL Final   • HCT 04/18/2023 40.3  39.0 - 51.0 % Final   • MCV 04/18/2023 89.6  78.0 - 100.0 fl Final   • MCH 04/18/2023 30.2  26.0 - 34.0 pg Final   • MCHC 04/18/2023 33.7  32.0 - 36.5 g/dL Final   • RDW-CV 04/18/2023 12.5  11.0 - 15.0 % Final   • RDW-SD 04/18/2023 40.6  39.0 - 50.0 fL Final   • PLT 04/18/2023 200  140 - 450 K/mcL Final   •  NRBC 04/18/2023 0  <=0 /100 WBC Final   • Neutrophil, Percent 04/18/2023 62  % Final   • Lymphocytes, Percent 04/18/2023 28  % Final   • Mono, Percent 04/18/2023 7  % Final   • Eosinophils, Percent 04/18/2023 1  % Final   • Basophils, Percent 04/18/2023 1  % Final   • Immature Granulocytes 04/18/2023 1  % Final   • Absolute Neutrophils 04/18/2023 3.5  1.8 - 7.7 K/mcL Final   • Absolute Lymphocytes 04/18/2023 1.5  1.0 - 4.0 K/mcL Final   • Absolute Monocytes 04/18/2023 0.4  0.3 - 0.9 K/mcL Final   • Absolute Eosinophils  04/18/2023 0.1  0.0 - 0.5 K/mcL Final   • Absolute Basophils 04/18/2023 0.0  0.0 - 0.3 K/mcL Final   • Absolute Immature Granulocytes 04/18/2023 0.0  0.0 - 0.2 K/mcL Final   • T4, Total 04/18/2023 11.4  4.7 - 13.3 mcg/dL Final        Imaging  No Imaging to review.      Assessment/Plan:  Essential hypertension, benign  On medication continue working on eating plan and exercise     Mixed hyperlipidemia  On medication continue working on eating plan and exercise     B12 deficiency  On replacement     Insulin resistance  Continue working on lower carb eating plan     Vitamin D deficiency  On replacement     Severe obesity (CMD)  Fluids: water >64oz, black coffee, water with lemon/pranav/cinnamon sometimes     Diet: When you are going out to eat for dinner, make sure you focus on lean protein and non-starchy veggies  Don't skip lunch - protein powder shake or tuna packet with carrots and hummus or cottage cheese or hard boiled eggs  Don't skip meals. Work on structure throughout the day, have a plan for the next day. Meal prepping can help with this!    Exercise/activity: Kunal is back int the pool walking half a mile 3 days a week. After that he does 2 miles on a bike and then does chest arms and abs one day and the other day does back shoulders and abs. Using mostly machines for the strength training. Patient working with knee injury and  currently. This has been an ongoing process since 2021.      Sleep: okay states gets awoken in the middle of the night sometimes     Psychological wellness: doing well patient states that he wanted to lose more weight this past month     Topiramate:  Does not feel any side effects also does not feel like its doing anything        Orders Placed This Encounter   • SERVICE TO SLEEP MEDICINE   • semaglutide-Weight Management (WEGOVY) 0.25 MG/0.5ML injection        Patient Instructions   To do list   • Please see a sleep specialist for evaluation and treatment for possible sleep apnea  • RMR and PFT   • Ultrasound of the abdomen   • Chest Xray and EKG (good for 6 months)   • Psychological clearance     Negro Yang PsyD- [address] [address]  Appointments: [phone] Office: [phone] Fax: [phone]    Nutrition Goals:   1. When you are going out to eat for dinner, make sure you focus on lean protein and non-starchy veggies   2. Don't skip lunch - protein powder shake or tuna packet with carrots and hummus or cottage cheese or hard boiled eggs  3. Don't skip meals. Work on structure throughout the day, have a plan for the next day. Meal prepping can help with this!    EXERCISE GOALS:          No follow-ups on file.         This visit is being performed via in person to discuss Office Visit    Clinician Location: [facility] [place] [address] Juan Jose Syed is in [place] and his identity has been established.   He was informed that consent to treat includes permission to submit charges to the applicable insurance on file. Kunal was advised regarding the potential risk inherent in video visits, as the assessment may be limited due to what can be seen on the screen which potentially results in an incomplete assessment; as well as either of us may discontinue the video visit if it is felt that the videoconferencing connections are not adequate for his/her situation.       No LOS data to display  This includes  pre-charting, chart review, documenting and referring/communicating with other health care professionals.    Electronically signed by: Malini Bateman PA-C, 6/6/2023

## 2024-03-27 NOTE — NURSING
[facility], [facility]  Nurses Note -- 4 Eyes      3/26/2024       Skin assessed on: Q Shift      [] No Pressure Injuries Present    []Prevention Measures Documented    [x] Yes LDA  for Pressure Injury Previously documented     [] Yes New Pressure Injury Discovered   [] LDA for New Pressure Injury Added      Attending RN:  Aman Feliz RN     Second RN:  DARRION Carey

## 2024-03-27 NOTE — NURSING
[facility] [place] 4 [place]      3/27/2024       Skin assessed on: Q Shift      [x] No Pressure Injuries Present    []Prevention Measures Documented    [] Yes LDA  for Pressure Injury Previously documented     [] Yes New Pressure Injury Discovered   [] LDA for New Pressure Injury Added      Attending RN:  Cherry Payne RN     Second RN:  Aman

## 2024-03-27 NOTE — PT/OT/SLP PROGRESS
Occupational Therapy      Patient Name:  Miguel Moore   MRN:  [id number]    Patient not seen today secondary to Dialysis. OT will follow-up as indicated/next Rx date.     3/27/2024

## 2024-03-27 NOTE — PT/OT/SLP PROGRESS
Physical Therapy      Patient Name:  Miguel Moore   MRN:  [id number]    Patient not seen today secondary to Dialysis. Will follow-up as able later hour/day .

## 2024-03-27 NOTE — PROGRESS NOTES
Date of Admission:3/20/2024    SUBJECTIVE:notes feeling ok on hd    Current Facility-Administered Medications   Medication    acetaminophen tablet 650 mg    amiodarone tablet 400 mg    apixaban tablet 5 mg    atorvastatin tablet 80 mg    cefTRIAXone (ROCEPHIN) 2 g in dextrose 5 % in water (D5W) 100 mL IVPB (MB+)    dextrose 10% bolus 125 mL 125 mL    dextrose 10% bolus 250 mL 250 mL    diphenoxylate-atropine 2.5-0.025 mg per tablet 1 tablet    finasteride tablet 5 mg    glucagon (human recombinant) injection 1 mg    glucose chewable tablet 16 g    glucose chewable tablet 24 g    insulin aspart U-100 pen 0-5 Units    melatonin tablet 6 mg    midodrine tablet 10 mg    naloxone 0.4 mg/mL injection 0.02 mg    ondansetron injection 4 mg    potassium, sodium phosphates 280-160-250 mg packet 1 packet    prochlorperazine injection Soln 5 mg    senna-docusate 8.6-50 mg per tablet 1 tablet    sodium chloride 0.9% bolus 250 mL 250 mL    sodium chloride 0.9% flush 10 mL    tamsulosin 24 hr capsule 0.8 mg    vancomycin - pharmacy to dose    vitamin renal formula (B-complex-vitamin c-folic acid) 1 mg per capsule 1 capsule       Wt Readings from Last 3 Encounters:   03/22/24 83.5 kg (184 lb 1.4 oz)   03/07/24 79.8 kg (175 lb 14.8 oz)   02/27/24 79.8 kg (175 lb 14.8 oz)     Temp Readings from Last 3 Encounters:   03/27/24 98.1 °F (36.7 °C)   02/06/24 98 °F (36.7 °C) (Oral)   02/06/24 98.1 °F (36.7 °C) (Oral)     BP Readings from Last 3 Encounters:   03/27/24 115/70   03/07/24 129/65   02/06/24 110/72     Pulse Readings from Last 3 Encounters:   03/27/24 88   02/06/24 80   02/06/24 80       Intake/Output Summary (Last 24 hours) at 3/27/2024 1247  Last data filed at 3/27/2024 0917  Gross per 24 hour   Intake 220 ml   Output --   Net 220 ml         PE:  GEN:wd male in nad  HEENT:ncat,eomi,mm  CVS:irregular  PULM:ctab  ABD:bs,soft  EXT: foot boots, avf arm left  NEURO:awake,alert    Recent Labs   Lab 03/27/24  0345   *   *    K 4.1      CO2 25   BUN 39*   CREATININE 8.3*   CALCIUM 7.5*         Lab Results   Component Value Date    .0 (H) 01/08/2021    CALCIUM 7.5 (L) 03/27/2024    PHOS 1.4 (L) 03/27/2024       Recent Labs   Lab 03/27/24  0345   WBC 14.30*   RBC 3.71*   HGB 9.7*   HCT 25.1*      MCV 68*   MCH 26.1*   MCHC 38.6*             A/P:  1.esrd. cont hd MWF. HD [facility] [place]. Seen on hd  today.  2.htn. sbp ok. Cont tx.  3.paf. hr ok. Following.  4.cellulitis. cont tx.  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 Cont epo.  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 Phos low. Replete. Off binders.   Metronidazole Pregnancy And Lactation Text: This medication is Pregnancy Category B and considered safe during pregnancy.  It is also excreted in breast milk.

## 2024-03-27 NOTE — ASSESSMENT & PLAN NOTE
Patient has Paroxysmal (<7 days) atrial fibrillation which is uncontrolled. Patient is currently in atrial fibrillation.  - new onset Afib  - on labetalol for BP at home, eliquis for chronic DVT but did miss some doses of eliquis  - in ED given diltiazem gtt but that caused hypotension  - EKG with Afib RVR with normal Qtc  - started amiodarone with conversion to NSR. Changed to PO amiodarone  - however, he had recurrent Afib and was placed back on amio gtt.   - Cardiology consulted  - plan for SONY/DCCV on 3/22/24 but converted to sinus rhythm. Has been in/out of afib but rate controlled  - continue home eliquis    Lab Results   Component Value Date    TSH 2.672 03/20/2024     - TTE normal EF, mild LAE    [id number] - VASc score:  Congestive Heart Failure or EF < 35% NO   Hypertension YES  +1   Age >= 75 NO   Diabetes Mellitus YES  +1   Stroke/TIA prior history YES  +2   Vascular disease (PAD, MI or Aortic Plaque)? YES  +1   Age 65 - 74 YES  +1   Female NO   Total 6   Sinus at this time.

## 2024-03-27 NOTE — NURSING
Upon departure from floor to dialysis: cardiac monitor in progress, patient awake, alert, and oriented x 4. No apparent distress noted at this time.

## 2024-03-27 NOTE — SUBJECTIVE & OBJECTIVE
Interval History: seen after HD, feeling better today. Nausea has improved but still not great appetite. In/Out of atrial flutter but rate controlled.    Review of Systems  Objective:     Vital Signs (Most Recent):  Temp: 98.4 °F (36.9 °C) (03/27/24 1230)  Pulse: 103 (03/27/24 1230)  Resp: 16 (03/27/24 1230)  BP: 118/64 (03/27/24 1230)  SpO2: 95 % (03/27/24 0704) Vital Signs (24h Range):  Temp:  [98 °F (36.7 °C)-99.5 °F (37.5 °C)] 98.4 °F (36.9 °C)  Pulse:  [] 103  Resp:  [16-18] 16  SpO2:  [92 %-96 %] 95 %  BP: (112-142)/(56-77) 118/64     Weight: 83.5 kg (184 lb 1.4 oz)  Body mass index is 27.99 kg/m².    Intake/Output Summary (Last 24 hours) at 3/27/2024 1518  Last data filed at 3/27/2024 0917  Gross per 24 hour   Intake 220 ml   Output --   Net 220 ml           Physical Exam  Vitals and nursing note reviewed.   Constitutional:       General: He is not in acute distress.     Appearance: He is ill-appearing.   Cardiovascular:      Rate and Rhythm: Normal rate.      Pulses: Normal pulses.   Pulmonary:      Effort: Pulmonary effort is normal. No respiratory distress.      Breath sounds: No wheezing.   Abdominal:      General: Bowel sounds are normal.   Musculoskeletal:         General: Swelling present.   Skin:     General: Skin is warm and dry.   Neurological:      Mental Status: He is alert. Mental status is at baseline.   Psychiatric:         Mood and Affect: Mood normal.         Thought Content: Thought content normal.             Significant Labs: All pertinent labs within the past 24 hours have been reviewed.    Significant Imaging: I have reviewed all pertinent imaging results/findings within the past 24 hours.

## 2024-03-27 NOTE — NURSING
Upon arrival to floor from dialysis: cardiac monitoring in progress, patient oriented to room, call bell in reach and bed in lowest position. Denies pain or discomfort at this time. No apparent distress noted.

## 2024-03-28 LAB
BASOPHILS # BLD AUTO: 0.04 K/UL (ref 0–0.2)
BASOPHILS NFR BLD: 0.3 % (ref 0–1.9)
CK SERPL-CCNC: 349 U/L (ref 20–200)
DIFFERENTIAL METHOD BLD: ABNORMAL
EOSINOPHIL # BLD AUTO: 0 K/UL (ref 0–0.5)
EOSINOPHIL NFR BLD: 0.3 % (ref 0–8)
ERYTHROCYTE [DISTWIDTH] IN BLOOD BY AUTOMATED COUNT: 18 % (ref 11.5–14.5)
HCT VFR BLD AUTO: 23.8 % (ref 40–54)
HGB BLD-MCNC: 8.9 G/DL (ref 14–18)
IMM GRANULOCYTES # BLD AUTO: 0.21 K/UL (ref 0–0.04)
IMM GRANULOCYTES NFR BLD AUTO: 1.4 % (ref 0–0.5)
LYMPHOCYTES # BLD AUTO: 0.9 K/UL (ref 1–4.8)
LYMPHOCYTES NFR BLD: 6.1 % (ref 18–48)
MCH RBC QN AUTO: 25.4 PG (ref 27–31)
MCHC RBC AUTO-ENTMCNC: 37.4 G/DL (ref 32–36)
MCV RBC AUTO: 68 FL (ref 82–98)
MONOCYTES # BLD AUTO: 1 K/UL (ref 0.3–1)
MONOCYTES NFR BLD: 7 % (ref 4–15)
NEUTROPHILS # BLD AUTO: 12.3 K/UL (ref 1.8–7.7)
NEUTROPHILS NFR BLD: 84.9 % (ref 38–73)
NRBC BLD-RTO: 0 /100 WBC
PLATELET # BLD AUTO: 233 K/UL (ref 150–450)
PMV BLD AUTO: 10 FL (ref 9.2–12.9)
POCT GLUCOSE: 121 MG/DL (ref 70–110)
POCT GLUCOSE: 134 MG/DL (ref 70–110)
POCT GLUCOSE: 137 MG/DL (ref 70–110)
POCT GLUCOSE: 150 MG/DL (ref 70–110)
RBC # BLD AUTO: 3.5 M/UL (ref 4.6–6.2)
URATE SERPL-MCNC: 4.9 MG/DL (ref 3.4–7)
WBC # BLD AUTO: 14.53 K/UL (ref 3.9–12.7)

## 2024-03-28 PROCEDURE — 36415 COLL VENOUS BLD VENIPUNCTURE: CPT | Mod: HCNC | Performed by: STUDENT IN AN ORGANIZED HEALTH CARE EDUCATION/TRAINING PROGRAM

## 2024-03-28 PROCEDURE — 84550 ASSAY OF BLOOD/URIC ACID: CPT | Mod: HCNC | Performed by: INTERNAL MEDICINE

## 2024-03-28 PROCEDURE — 85025 COMPLETE CBC W/AUTO DIFF WBC: CPT | Mod: HCNC | Performed by: STUDENT IN AN ORGANIZED HEALTH CARE EDUCATION/TRAINING PROGRAM

## 2024-03-28 PROCEDURE — 36415 COLL VENOUS BLD VENIPUNCTURE: CPT | Mod: HCNC,XB | Performed by: INTERNAL MEDICINE

## 2024-03-28 PROCEDURE — 82550 ASSAY OF CK (CPK): CPT | Mod: HCNC | Performed by: INTERNAL MEDICINE

## 2024-03-28 PROCEDURE — 25500020 PHARM REV CODE 255: Mod: HCNC | Performed by: STUDENT IN AN ORGANIZED HEALTH CARE EDUCATION/TRAINING PROGRAM

## 2024-03-28 PROCEDURE — 25000003 PHARM REV CODE 250: Mod: HCNC | Performed by: HOSPITALIST

## 2024-03-28 PROCEDURE — A4216 STERILE WATER/SALINE, 10 ML: HCPCS | Mod: HCNC | Performed by: HOSPITALIST

## 2024-03-28 PROCEDURE — 25000003 PHARM REV CODE 250: Mod: HCNC | Performed by: STUDENT IN AN ORGANIZED HEALTH CARE EDUCATION/TRAINING PROGRAM

## 2024-03-28 PROCEDURE — 21400001 HC TELEMETRY ROOM: Mod: HCNC

## 2024-03-28 PROCEDURE — 63600175 PHARM REV CODE 636 W HCPCS: Mod: HCNC | Performed by: STUDENT IN AN ORGANIZED HEALTH CARE EDUCATION/TRAINING PROGRAM

## 2024-03-28 RX ORDER — HYDROMORPHONE HYDROCHLORIDE 1 MG/ML
0.5 INJECTION, SOLUTION INTRAMUSCULAR; INTRAVENOUS; SUBCUTANEOUS EVERY 6 HOURS PRN
Status: DISCONTINUED | OUTPATIENT
Start: 2024-03-28 | End: 2024-04-08

## 2024-03-28 RX ADMIN — MIDODRINE HYDROCHLORIDE 10 MG: 5 TABLET ORAL at 08:03

## 2024-03-28 RX ADMIN — Medication 10 ML: at 07:03

## 2024-03-28 RX ADMIN — APIXABAN 5 MG: 5 TABLET, FILM COATED ORAL at 09:03

## 2024-03-28 RX ADMIN — HYDROMORPHONE HYDROCHLORIDE 0.5 MG: 1 INJECTION, SOLUTION INTRAMUSCULAR; INTRAVENOUS; SUBCUTANEOUS at 09:03

## 2024-03-28 RX ADMIN — MIDODRINE HYDROCHLORIDE 10 MG: 5 TABLET ORAL at 09:03

## 2024-03-28 RX ADMIN — MEROPENEM 1 G: 1 INJECTION INTRAVENOUS at 05:03

## 2024-03-28 RX ADMIN — TAMSULOSIN HYDROCHLORIDE 0.8 MG: 0.4 CAPSULE ORAL at 09:03

## 2024-03-28 RX ADMIN — IOHEXOL 80 ML: 350 INJECTION, SOLUTION INTRAVENOUS at 03:03

## 2024-03-28 RX ADMIN — HYDROMORPHONE HYDROCHLORIDE 0.5 MG: 1 INJECTION, SOLUTION INTRAMUSCULAR; INTRAVENOUS; SUBCUTANEOUS at 11:03

## 2024-03-28 RX ADMIN — DIPHENOXYLATE HYDROCHLORIDE AND ATROPINE SULFATE 1 TABLET: 2.5; .025 TABLET ORAL at 09:03

## 2024-03-28 RX ADMIN — FINASTERIDE 5 MG: 5 TABLET, FILM COATED ORAL at 09:03

## 2024-03-28 RX ADMIN — ATORVASTATIN CALCIUM 80 MG: 40 TABLET, FILM COATED ORAL at 09:03

## 2024-03-28 RX ADMIN — Medication 10 ML: at 05:03

## 2024-03-28 RX ADMIN — ACETAMINOPHEN 650 MG: 325 TABLET ORAL at 08:03

## 2024-03-28 RX ADMIN — AMIODARONE HYDROCHLORIDE 400 MG: 200 TABLET ORAL at 08:03

## 2024-03-28 RX ADMIN — APIXABAN 5 MG: 5 TABLET, FILM COATED ORAL at 08:03

## 2024-03-28 RX ADMIN — AMIODARONE HYDROCHLORIDE 400 MG: 200 TABLET ORAL at 09:03

## 2024-03-28 RX ADMIN — Medication 10 ML: at 12:03

## 2024-03-28 RX ADMIN — ACETAMINOPHEN 650 MG: 325 TABLET ORAL at 04:03

## 2024-03-28 RX ADMIN — NEPHROCAP 1 CAPSULE: 1 CAP ORAL at 09:03

## 2024-03-28 NOTE — PLAN OF CARE
Recommendations    1. Continue Renal ADA diet; monitoring PO intake of meals and snacks.   2. Continue to monitor PO intake.   3. Continue to monitor weights and labs.   4. Collaboration with medical providers    Goals: 1. Pt to meet % EEN/EPN by RD follow up  Nutrition Goal Status: new  Communication of RD Recs:  (POC)    Assessment and Plan    Nutrition Problem  diabetes    Related to (etiology):   DM    Signs and Symptoms (as evidenced by):   Hemoglobin A1C   Date Value Ref Range Status   03/20/2024 5.8 (H) 4.0 - 5.6 % Final     Comment:     ADA Screening Guidelines:  5.7-6.4%  Consistent with prediabetes  >or=6.5%  Consistent with diabetes    High levels of fetal hemoglobin interfere with the HbA1C  assay. Heterozygous hemoglobin variants (HbS, HgC, etc)do  not significantly interfere with this assay.   However, presence of multiple variants may affect accuracy.     10/26/2023 5.9 (H) 4.0 - 5.6 % Final     Comment:     ADA Screening Guidelines:  5.7-6.4%  Consistent with prediabetes  >or=6.5%  Consistent with diabetes    High levels of fetal hemoglobin interfere with the HbA1C  assay. Heterozygous hemoglobin variants (HbS, HgC, etc)do  not significantly interfere with this assay.   However, presence of multiple variants may affect accuracy.     04/27/2023 5.9 (H) 4.0 - 5.6 % Final     Comment:     ADA Screening Guidelines:  5.7-6.4%  Consistent with prediabetes  >or=6.5%  Consistent with diabetes    High levels of fetal hemoglobin interfere with the HbA1C  assay. Heterozygous hemoglobin variants (HbS, HgC, etc)do  not significantly interfere with this assay.   However, presence of multiple variants may affect accuracy.           Interventions/Recommendations (treatment strategy):  Collaboration with medical providers    Nutrition Diagnosis Status:   New

## 2024-03-28 NOTE — PT/OT/SLP PROGRESS
"Occupational Therapy      Patient Name:  Miguel Moore   MRN:  [id number]    Attempt @ 1005: Pt declined, stating "that gout is bad today." Pt declined participation;  OT assisted pt w/ using call button to request pain meds.   Attempt @ 1502: Per chart, pt HOLLY to CT Scan.     3/28/2024  "

## 2024-03-28 NOTE — ASSESSMENT & PLAN NOTE
R ankle pain, swelling, warmth present. Potential gout vs cellulitis. 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 No fracture present. Uric acid normal. Arterial US and venous US with no clots, appropriate flow  - on ceftriaxone/vanc for 7 days and still with pain and now with increased WBC and fevers  - blood cultures currently NGTD  - has trialed colchicine with no relief  - Has area of fluctuation on right lateral ankle, plan for CT scan of ankle for further evaluation - if abscess/arthritis will consult Orthopedic Surgery

## 2024-03-28 NOTE — PROGRESS NOTES
Date of Admission:3/20/2024    SUBJECTIVE:notes feeling  poorly, not hungry  [facility] bedside  Current Facility-Administered Medications   Medication    acetaminophen tablet 650 mg    amiodarone tablet 400 mg    apixaban tablet 5 mg    atorvastatin tablet 80 mg    dextrose 10% bolus 125 mL 125 mL    dextrose 10% bolus 250 mL 250 mL    diphenoxylate-atropine 2.5-0.025 mg per tablet 1 tablet    finasteride tablet 5 mg    glucagon (human recombinant) injection 1 mg    glucose chewable tablet 16 g    glucose chewable tablet 24 g    HYDROmorphone injection 0.5 mg    insulin aspart U-100 pen 0-5 Units    melatonin tablet 6 mg    [START ON 3/29/2024] meropenem (MERREM) 1 g in sodium chloride 0.9 % 100 mL IVPB (MB+)    midodrine tablet 10 mg    naloxone 0.4 mg/mL injection 0.02 mg    ondansetron injection 4 mg    prochlorperazine injection Soln 5 mg    senna-docusate 8.6-50 mg per tablet 1 tablet    sodium chloride 0.9% bolus 250 mL 250 mL    sodium chloride 0.9% flush 10 mL    tamsulosin 24 hr capsule 0.8 mg    vancomycin - pharmacy to dose    vitamin renal formula (B-complex-vitamin c-folic acid) 1 mg per capsule 1 capsule       Wt Readings from Last 3 Encounters:   03/28/24 83.5 kg (184 lb 1.4 oz)   03/07/24 79.8 kg (175 lb 14.8 oz)   02/27/24 79.8 kg (175 lb 14.8 oz)     Temp Readings from Last 3 Encounters:   03/28/24 98 °F (36.7 °C) (Oral)   02/06/24 98 °F (36.7 °C) (Oral)   02/06/24 98.1 °F (36.7 °C) (Oral)     BP Readings from Last 3 Encounters:   03/28/24 137/63   03/07/24 129/65   02/06/24 110/72     Pulse Readings from Last 3 Encounters:   03/28/24 87   02/06/24 80   02/06/24 80       Intake/Output Summary (Last 24 hours) at 3/28/2024 1553  Last data filed at 3/28/2024 1212  Gross per 24 hour   Intake 1024.52 ml   Output --   Net 1024.52 ml         PE:  GEN:wd male in nad  HEENT:ncat,eomi,mm  CVS:irregular  PULM:ctab  ABD:bs,soft  EXT: foot boots, avf arm left  NEURO:awake,alert    No results for input(s):  ""GLU", "NA", "K", "CL", "CO2", "BUN", "CREATININE", "CALCIUM", "MG" in the last 24 hours.      Lab Results   Component Value Date    .0 (H) 01/08/2021    CALCIUM 7.5 (L) 03/27/2024    PHOS 1.4 (L) 03/27/2024       Recent Labs   Lab 03/28/24  0657   WBC 14.53*   RBC 3.50*   HGB 8.9*   HCT 23.8*      MCV 68*   MCH 25.4*   MCHC 37.4*             A/P:  1.esrd. cont hd MWF. HD in am.  2.htn. sbp ok. Cont tx.  3.paf. hr ok. Following.  4.cellulitis. ck uric acid. Not gout. Ck cpk. Septic jt? 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 Cont epo.  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 Phos low. [place]ans[place] level in am. Off binders.  Discussed with primary.  "

## 2024-03-28 NOTE — PROGRESS NOTES
[facility] [place]  Adult Nutrition  Consult Note    SUMMARY     Recommendations    1. Continue Renal ADA diet; monitoring PO intake of meals and snacks.   2. Continue to monitor PO intake.   3. Continue to monitor weights and labs.   4. Collaboration with medical providers    Goals: 1. Pt to meet % EEN/EPN by RD follow up  Nutrition Goal Status: new  Communication of RD Recs:  (POC)    Assessment and Plan    Nutrition Problem  diabetes    Related to (etiology):   DM    Signs and Symptoms (as evidenced by):   Hemoglobin A1C   Date Value Ref Range Status   03/20/2024 5.8 (H) 4.0 - 5.6 % Final     Comment:     ADA Screening Guidelines:  5.7-6.4%  Consistent with prediabetes  >or=6.5%  Consistent with diabetes    High levels of fetal hemoglobin interfere with the HbA1C  assay. Heterozygous hemoglobin variants (HbS, HgC, etc)do  not significantly interfere with this assay.   However, presence of multiple variants may affect accuracy.     10/26/2023 5.9 (H) 4.0 - 5.6 % Final     Comment:     ADA Screening Guidelines:  5.7-6.4%  Consistent with prediabetes  >or=6.5%  Consistent with diabetes    High levels of fetal hemoglobin interfere with the HbA1C  assay. Heterozygous hemoglobin variants (HbS, HgC, etc)do  not significantly interfere with this assay.   However, presence of multiple variants may affect accuracy.     04/27/2023 5.9 (H) 4.0 - 5.6 % Final     Comment:     ADA Screening Guidelines:  5.7-6.4%  Consistent with prediabetes  >or=6.5%  Consistent with diabetes    High levels of fetal hemoglobin interfere with the HbA1C  assay. Heterozygous hemoglobin variants (HbS, HgC, etc)do  not significantly interfere with this assay.   However, presence of multiple variants may affect accuracy.           Interventions/Recommendations (treatment strategy):  Collaboration with medical providers    Nutrition Diagnosis Status:   New      Reason for Assessment    Reason For Assessment: length of stay  Diagnosis:   "(paroxysmal atrial fibrillation with RVR)  Relevant Medical History: .pmh  Interdisciplinary Rounds: did not attend  General Information Comments: Pt LOS > 8 days. Pt admitted with paroxysmal atrial fibrillation with RVR. Pmhx of  DM ll. ESRD, stroke and open wound. Currently on renal dm diet with varied intake % since admit. BMI 27.99, overweight. No recent significant weight changes per chart review. NFPE not able to be performed at this time, pt is on isolation precautions  Nutrition Discharge Planning: renal ADA    Nutrition Risk Screen    Nutrition Risk Screen: no indicators present    Nutrition/Diet History    Spiritual, Cultural Beliefs, Cheondoism Practices, Values that Affect Care: no  Food Allergies: NKFA    Anthropometrics    Temp: (!) 100.9 °F (38.3 °C)  Height Method: Stated  Height: 5' 8" (172.7 cm)  Height (inches): 68 in  Weight Method: Bed Scale  Weight: 83.5 kg (184 lb 1.4 oz)  Weight (lb): 184.09 lb  Ideal Body Weight (IBW), Male: 154 lb  % Ideal Body Weight, Male (lb): 119.54 %  BMI (Calculated): 28  BMI Grade: 25 - 29.9 - overweight       Lab/Procedures/Meds    Pertinent Labs Reviewed: reviewed  BMP  Lab Results   Component Value Date     (L) 03/27/2024    K 4.1 03/27/2024     03/27/2024    CO2 25 03/27/2024    BUN 39 (H) 03/27/2024    CREATININE 8.3 (H) 03/27/2024    CALCIUM 7.5 (L) 03/27/2024    ANIONGAP 9 03/27/2024    EGFRNORACEVR 6 (A) 03/27/2024       Pertinent Medications Reviewed: reviewed  Current Outpatient Medications   Medication Instructions    amLODIPine (NORVASC) 10 MG tablet Take 1 tablet by mouth once daily    apixaban (ELIQUIS) 5 mg, Oral, 2 times daily    aspirin (ECOTRIN) 81 mg, Oral, Daily    atorvastatin (LIPITOR) 80 mg, Oral, Daily    finasteride (PROSCAR) 5 mg, Oral, Daily    iron sucrose in NS (VENOFER) 50 mg    labetaloL (NORMODYNE) 100 mg, Oral, Daily    methoxy peg-epoetin beta (MIRCERA INJ) 75 mcg    mupirocin (BACTROBAN) 2 % ointment Apply to " affected area 3 times daily    RENVELA 800 mg, Oral, 3 times daily with meals    sildenafiL (VIAGRA) 100 mg, Oral, Daily PRN    tamsulosin (FLOMAX) 0.8 mg, Oral, Daily          Estimated/Assessed Needs    Weight Used For Calorie Calculations: 83.5 kg (184 lb 1.4 oz)  Energy Calorie Requirements (kcal): 1888 kcal  Energy Need Method: Pratt-St Jeor (x 1.2)  Protein Requirements: 84 g  Weight Used For Protein Calculations: 83.5 kg (184 lb 1.4 oz)     Estimated Fluid Requirement Method: RDA Method  RDA Method (mL): 1888         Nutrition Prescription Ordered    Current Diet Order: renal ada    Evaluation of Received Nutrient/Fluid Intake    I/O: i/o  Energy Calories Required: not meeting needs  Protein Required: not meeting needs  Fluid Required: not meeting needs  Comments: lbm 3/26/24  % Intake of Estimated Energy Needs: 25 - 50 %  % Meal Intake: 25 - 50 %    Nutrition Risk    Level of Risk/Frequency of Follow-up: low       Monitor and Evaluation    Food and Nutrient Intake: food and beverage intake  Food and Nutrient Adminstration: diet order  Knowledge/Beliefs/Attitudes: food and nutrition knowledge/skill, beliefs and attitudes  Physical Activity and Function: nutrition-related ADLs and IADLs, factors affecting access to physical activity  Anthropometric Measurements: weight, weight change, body mass index  Biochemical Data, Medical Tests and Procedures: electrolyte and renal panel  Nutrition-Focused Physical Findings: overall appearance       Nutrition Follow-Up    RD Follow-up?: Yes    Katie Chacon, Registration Eligible, Provisional LDN

## 2024-03-28 NOTE — PLAN OF CARE
Pt alert able to make needs known,bella meds well,IV abx remains in progress,no s/s adverse reaction noted,reposition  q 2hrs,pain controlled by prn pain medication,POC explained,remains free from falls and pressure injuries,safety maintained,continue monitoring.      Problem: Adult Inpatient Plan of Care  Goal: Plan of Care Review  Outcome: Ongoing, Progressing  Flowsheets (Taken 3/28/2024 1328)  Plan of Care Reviewed With: patient  Goal: Patient-Specific Goal (Individualized)  Outcome: Ongoing, Progressing  Goal: Absence of Hospital-Acquired Illness or Injury  Outcome: Ongoing, Progressing  Intervention: Identify and Manage Fall Risk  Flowsheets (Taken 3/28/2024 1328)  Safety Promotion/Fall Prevention:   assistive device/personal item within reach   Fall Risk reviewed with patient/family   nonskid shoes/socks when out of bed   high risk medications identified   side rails raised x 2   room near unit station   medications reviewed   instructed to call staff for mobility   bed alarm set  Intervention: Prevent Skin Injury  Flowsheets (Taken 3/28/2024 1328)  Body Position:   position changed independently   weight shifting  Skin Protection:   adhesive use limited   tubing/devices free from skin contact  Intervention: Prevent and Manage VTE (Venous Thromboembolism) Risk  Flowsheets (Taken 3/28/2024 1328)  Activity Management:   Ankle pumps - L1   Arm raise - L1   Rolling - L1   Straight leg raise - L1   Up to bedside commode - L3  VTE Prevention/Management:   bleeding precations maintained   ambulation promoted   bleeding risk assessed  Range of Motion: active ROM (range of motion) encouraged  Intervention: Prevent Infection  Flowsheets (Taken 3/28/2024 1328)  Infection Prevention: hand hygiene promoted  Goal: Optimal Comfort and Wellbeing  Outcome: Ongoing, Progressing  Goal: Readiness for Transition of Care  Outcome: Ongoing, Progressing     Problem: Infection  Goal: Absence of Infection Signs and Symptoms  Outcome:  Ongoing, Progressing  Intervention: Prevent or Manage Infection  Flowsheets (Taken 3/28/2024 1328)  Infection Management: aseptic technique maintained     Problem: Diabetes Comorbidity  Goal: Blood Glucose Level Within Targeted Range  Outcome: Ongoing, Progressing     Problem: Infection (Hemodialysis)  Goal: Absence of Infection Signs and Symptoms  Outcome: Ongoing, Progressing     Problem: Skin Injury Risk Increased  Goal: Skin Health and Integrity  Outcome: Ongoing, Progressing  Intervention: Optimize Skin Protection  Flowsheets (Taken 3/28/2024 1328)  Pressure Reduction Techniques:   frequent weight shift encouraged   pressure points protected   weight shift assistance provided  Skin Protection:   adhesive use limited   tubing/devices free from skin contact  Head of Bed (HOB) Positioning: HOB at 30-45 degrees     Problem: Impaired Wound Healing  Goal: Optimal Wound Healing  Outcome: Ongoing, Progressing     Problem: Fall Injury Risk  Goal: Absence of Fall and Fall-Related Injury  Outcome: Ongoing, Progressing  Intervention: Identify and Manage Contributors  Flowsheets (Taken 3/28/2024 1328)  Self-Care Promotion:   independence encouraged   BADL personal objects within reach   BADL personal routines maintained   meal set-up provided   safe use of adaptive equipment encouraged  Medication Review/Management:   medications reviewed   high-risk medications identified  Intervention: Promote Injury-Free Environment  Flowsheets (Taken 3/28/2024 1328)  Safety Promotion/Fall Prevention:   assistive device/personal item within reach   Fall Risk reviewed with patient/family   nonskid shoes/socks when out of bed   high risk medications identified   side rails raised x 2   room near unit station   medications reviewed   instructed to call staff for mobility   bed alarm set

## 2024-03-28 NOTE — PROGRESS NOTES
Vancomycin consult follow-up:    Patient reviewed, dialysis scheduled every Mon, Wed, Fri, no new levels, continue current therapy; Next levels due: random due 3/29/2024 at 0400

## 2024-03-28 NOTE — PT/OT/SLP PROGRESS
Physical Therapy      Patient Name:  Miguel Moore   MRN:  [id number]    Patient not seen today secondary to Testing/imaging (xray/CT/MRI). Will follow-up as able.

## 2024-03-28 NOTE — AI DETERIORATION ALERT
Artificial Intelligence Notification  [place] [address] [address] [place] [address]  Phone: [phone]          Patient with significant fever overnight despite being on Meropenem, all MO's grown are sensitive to it.   Infection/wound looks better/improving  -Fever yesterday, was expanded to Meropenem, likely just needs a couple days to work  -Consider cont GOUT treatment, still with assoc pains  -discussed w primary            This documentation was triggered by an Artificial Intelligence Notification:    Admit Date: 3/20/2024   LOS: 8  Code Status: Full Code  : 1955  Age: 68 y.o.  Weight:   Wt Readings from Last 1 Encounters:   24 83.5 kg (184 lb 1.4 oz)        Sex: male  Bed: W435/W435 A  MRN: [id number]  Attending Physician: Bonifacio Reyes MD     Date of Alert: 2024  Time AI Alert Received: 0800            Vitals:    24 0748   BP:    Pulse: 94   Resp:    Temp:      SpO2: 98 %

## 2024-03-28 NOTE — SUBJECTIVE & OBJECTIVE
Interval History: having fevers overnight up to 102.7. Antibiotics changed to meropenem. Unable to get urine sample yesterday. Having increased pain in right ankle, feels like fluctuant area on lateral ankle.    Review of Systems  Objective:     Vital Signs (Most Recent):  Temp: 98 °F (36.7 °C) (03/28/24 1145)  Pulse: 87 (03/28/24 1145)  Resp: 19 (03/28/24 1145)  BP: 137/63 (03/28/24 1145)  SpO2: (!) 90 % (03/28/24 1145) Vital Signs (24h Range):  Temp:  [98 °F (36.7 °C)-102.7 °F (39.3 °C)] 98 °F (36.7 °C)  Pulse:  [] 87  Resp:  [18-19] 19  SpO2:  [86 %-98 %] 90 %  BP: (100-137)/(52-63) 137/63     Weight: 83.5 kg (184 lb 1.4 oz)  Body mass index is 27.99 kg/m².    Intake/Output Summary (Last 24 hours) at 3/28/2024 1349  Last data filed at 3/28/2024 0816  Gross per 24 hour   Intake 784.52 ml   Output --   Net 784.52 ml           Physical Exam  Vitals and nursing note reviewed.   Constitutional:       General: He is not in acute distress.     Appearance: He is ill-appearing.   Cardiovascular:      Rate and Rhythm: Normal rate.      Pulses: Normal pulses.   Pulmonary:      Effort: Pulmonary effort is normal. No respiratory distress.      Breath sounds: No wheezing.   Abdominal:      General: Bowel sounds are normal.   Musculoskeletal:         General: Swelling present.      Comments: Right ankle with area of fluctuation, concerning for possible abscess   Skin:     General: Skin is warm and dry.   Neurological:      Mental Status: He is alert. Mental status is at baseline.   Psychiatric:         Mood and Affect: Mood normal.         Thought Content: Thought content normal.             Significant Labs: All pertinent labs within the past 24 hours have been reviewed.    Significant Imaging: I have reviewed all pertinent imaging results/findings within the past 24 hours.

## 2024-03-28 NOTE — PROGRESS NOTES
[facility] Medicine  Progress Note    Patient Name: Miguel Moore  MRN: [id number]  Patient Class: IP- Inpatient   Admission Date: 3/20/2024  Length of Stay: 8 days  Attending Physician: Bonifacio Reyes MD  Primary Care Provider: Raciel Raymond MD        Subjective:     Principal Problem:Paroxysmal atrial fibrillation with RVR        HPI:  Mr Miguel Moore is a 68 y.o. man with ESRD on HD, HTN, DM with neuropathy, history of CVA with residual R sided weakness, chronic DVT on eliquis who presented with feeling poorly. He attended his usual dialysis session on Monday 3/18 without issues. He developed fatigue and R ankle swelling. He has some pain with palpation but is able to walk. No reports of trauma. No wounds. No falls. No history of gout. Today he was feeling worse so did not go to dialysis and came to the ED. No fevers. No shortness of breath. Normal appetite. No nausea, vomiting. No chest pain. No palpitations.     In the ED, afebrile with HR initially controlled then to 130s Afib RVR. Started diltiazem but became hypotensive. Diltiazem stopped. Given antibiotics. Admitted for further management.     Overview/Hospital Course:  Mr Miguel Moore is a 68 y.o. man with ESRD on HD, DM who was admitted with new onset Afib RVR, hyperkalemia from missed HD session, and R ankle cellulitis. Started amio gtt with conversion to NSR. TTE EF 55-60%, mild LAE. Cardiology consulted. Now on PO amiodarone and continues home eliquis (on this for chronic DVT). He received HD with resolution of hyperkalemia. He is on CTX for his R ankle cellulitis and was given colchicine in case gout could contribute. Also added vanc,foot swelling is improving.This is improving. He developed recurrent Afib and amio gtt resumed. Cardiology was planning SONY/DCCV on 3/22. But converted to sinus. Transferred to floor. Right ankle cellulitis appears to be improving. Plan to complete 7 day course of antibiotics. PT/OT  recommending SNF however patient prefers home health.      Interval History: having fevers overnight up to 102.7. Antibiotics changed to meropenem. Unable to get urine sample yesterday. Having increased pain in right ankle, feels like fluctuant area on lateral ankle.    Review of Systems  Objective:     Vital Signs (Most Recent):  Temp: 98 °F (36.7 °C) (03/28/24 1145)  Pulse: 87 (03/28/24 1145)  Resp: 19 (03/28/24 1145)  BP: 137/63 (03/28/24 1145)  SpO2: (!) 90 % (03/28/24 1145) Vital Signs (24h Range):  Temp:  [98 °F (36.7 °C)-102.7 °F (39.3 °C)] 98 °F (36.7 °C)  Pulse:  [] 87  Resp:  [18-19] 19  SpO2:  [86 %-98 %] 90 %  BP: (100-137)/(52-63) 137/63     Weight: 83.5 kg (184 lb 1.4 oz)  Body mass index is 27.99 kg/m².    Intake/Output Summary (Last 24 hours) at 3/28/2024 1349  Last data filed at 3/28/2024 0816  Gross per 24 hour   Intake 784.52 ml   Output --   Net 784.52 ml           Physical Exam  Vitals and nursing note reviewed.   Constitutional:       General: He is not in acute distress.     Appearance: He is ill-appearing.   Cardiovascular:      Rate and Rhythm: Normal rate.      Pulses: Normal pulses.   Pulmonary:      Effort: Pulmonary effort is normal. No respiratory distress.      Breath sounds: No wheezing.   Abdominal:      General: Bowel sounds are normal.   Musculoskeletal:         General: Swelling present.      Comments: Right ankle with area of fluctuation, concerning for possible abscess   Skin:     General: Skin is warm and dry.   Neurological:      Mental Status: He is alert. Mental status is at baseline.   Psychiatric:         Mood and Affect: Mood normal.         Thought Content: Thought content normal.             Significant Labs: All pertinent labs within the past 24 hours have been reviewed.    Significant Imaging: I have reviewed all pertinent imaging results/findings within the past 24 hours.    Assessment/Plan:      * Paroxysmal atrial fibrillation with RVR  Patient has Paroxysmal  (<7 days) atrial fibrillation which is uncontrolled. Patient is currently in atrial fibrillation.  - new onset Afib  - on labetalol for BP at home, eliquis for chronic DVT but did miss some doses of eliquis  - in ED given diltiazem gtt but that caused hypotension  - EKG with Afib RVR with normal Qtc  - started amiodarone with conversion to NSR. Changed to PO amiodarone  - however, he had recurrent Afib and was placed back on amio gtt.   - Cardiology consulted  - plan for SONY/DCCV on 3/22/24 but converted to sinus rhythm. Has been in/out of afib but rate controlled  - continue home eliquis    Lab Results   Component Value Date    TSH 2.672 03/20/2024     - TTE normal EF, mild LAE    [id number] - VASc score:  Congestive Heart Failure or EF < 35% NO   Hypertension YES  +1   Age >= 75 NO   Diabetes Mellitus YES  +1   Stroke/TIA prior history YES  +2   Vascular disease (PAD, MI or Aortic Plaque)? YES  +1   Age 65 - 74 YES  +1   Female NO   Total 6   Sinus at this time.      Cellulitis of right foot  R ankle pain, swelling, warmth present. Potential gout vs cellulitis. 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 No fracture present. Uric acid normal. Arterial US and venous US with no clots, appropriate flow  - on ceftriaxone/vanc for 7 days and still with pain and now with increased WBC and fevers  - blood cultures currently NGTD  - has trialed colchicine with no relief  - Has area of fluctuation on right lateral ankle, plan for CT scan of ankle for further evaluation - if abscess/arthritis will consult Orthopedic Surgery      Anemia in chronic kidney disease  Patient's anemia is currently controlled. Has not received any PRBCs to date. Etiology likely d/t chronic disease due to ESRD  Current CBC reviewed-   Lab Results   Component Value Date    HGB 10.1 (L) 03/25/2024    HCT 26.1 (L) 03/25/2024     Monitor serial CBC and transfuse if patient becomes hemodynamically unstable, symptomatic or H/H  drops below 7/21.    History of stroke  pT,OT.      Secondary hyperparathyroidism of renal origin  Continue renvela       ESRD (end stage renal disease)  ESRD via LUE AVF. Last session 3/18/24. Missed 3/20/24 due to fatigue. Usually MWF  - hyperkalemic on admit. Does not appear volume overloaded.   - Nephrology Dr Currie consulted. Received HD on 3/20. K normalized.   - continue MWF HD    Hemiplegia and hemiparesis following cerebral infarction affecting right dominant side  No signs of acute stroke. Does have RUE weakness.   - continue home asa, eliquis, statin      Seizure disorder as sequela of cerebrovascular accident  Not currently on antiepileptics. No seizure activity reported. Monitor.       Controlled type 2 diabetes mellitus with chronic kidney disease on chronic dialysis, with long-term current use of insulin  A1c:   Lab Results   Component Value Date    HGBA1C 5.8 (H) 03/20/2024     Meds: SSI PRN to maintain goal 140-180  ADA renal diet, accuchecks, hypoglycemic protocol      Dyslipidemia  Continue statin      Benign essential HTN  Chronic. Latest blood pressure and vitals reviewed-     Temp:  [97.4 °F (36.3 °C)-99.7 °F (37.6 °C)]   Pulse:  [81-93]   Resp:  [14-19]   BP: (102-135)/(51-69)   SpO2:  [86 %-97 %] .   Home meds for hypertension were reviewed and noted below.   Hypertension Medications               amLODIPine (NORVASC) 10 MG tablet Take 1 tablet by mouth once daily    labetaloL (NORMODYNE) 100 MG tablet Take 1 tablet (100 mg total) by mouth once daily.          - BP Rx held on admit due to hypotension  - BP improved. Resumed home Rx    Will utilize p.r.n. blood pressure medication only if patient's blood pressure greater than 180/110 and he develops symptoms such as worsening chest pain or shortness of breath.      VTE Risk Mitigation (From admission, onward)           Ordered     apixaban tablet 5 mg  2 times daily         03/20/24 2357                    Discharge Planning   GARY:      Code  Status: Full Code   Is the patient medically ready for discharge?:     Reason for patient still in hospital (select all that apply): Treatment  Discharge Plan A: Skilled Nursing Facility   Discharge Delays: None known at this time              Bonifacio Reyes MD  Department of Hospital Medicine   [facility]

## 2024-03-29 PROBLEM — R50.9 FEVER: Status: ACTIVE | Noted: 2024-03-29

## 2024-03-29 LAB
BASOPHILS # BLD AUTO: 0.05 K/UL (ref 0–0.2)
BASOPHILS NFR BLD: 0.3 % (ref 0–1.9)
DIFFERENTIAL METHOD BLD: ABNORMAL
EOSINOPHIL # BLD AUTO: 0 K/UL (ref 0–0.5)
EOSINOPHIL NFR BLD: 0.3 % (ref 0–8)
ERYTHROCYTE [DISTWIDTH] IN BLOOD BY AUTOMATED COUNT: 19.1 % (ref 11.5–14.5)
HCT VFR BLD AUTO: 24.7 % (ref 40–54)
HGB BLD-MCNC: 8.9 G/DL (ref 14–18)
IMM GRANULOCYTES # BLD AUTO: 0.26 K/UL (ref 0–0.04)
IMM GRANULOCYTES NFR BLD AUTO: 1.8 % (ref 0–0.5)
LYMPHOCYTES # BLD AUTO: 0.8 K/UL (ref 1–4.8)
LYMPHOCYTES NFR BLD: 5.6 % (ref 18–48)
MCH RBC QN AUTO: 24.7 PG (ref 27–31)
MCHC RBC AUTO-ENTMCNC: 36 G/DL (ref 32–36)
MCV RBC AUTO: 69 FL (ref 82–98)
MONOCYTES # BLD AUTO: 1.2 K/UL (ref 0.3–1)
MONOCYTES NFR BLD: 8.5 % (ref 4–15)
NEUTROPHILS # BLD AUTO: 12.1 K/UL (ref 1.8–7.7)
NEUTROPHILS NFR BLD: 83.5 % (ref 38–73)
NRBC BLD-RTO: 0 /100 WBC
PHOSPHATE SERPL-MCNC: 3.5 MG/DL (ref 2.7–4.5)
PLATELET # BLD AUTO: 264 K/UL (ref 150–450)
PMV BLD AUTO: 10.5 FL (ref 9.2–12.9)
POCT GLUCOSE: 113 MG/DL (ref 70–110)
POCT GLUCOSE: 116 MG/DL (ref 70–110)
POCT GLUCOSE: 180 MG/DL (ref 70–110)
POCT GLUCOSE: 213 MG/DL (ref 70–110)
RBC # BLD AUTO: 3.6 M/UL (ref 4.6–6.2)
VANCOMYCIN SERPL-MCNC: 15.1 UG/ML
WBC # BLD AUTO: 14.49 K/UL (ref 3.9–12.7)

## 2024-03-29 PROCEDURE — 63600175 PHARM REV CODE 636 W HCPCS: Mod: HCNC | Performed by: INTERNAL MEDICINE

## 2024-03-29 PROCEDURE — 85025 COMPLETE CBC W/AUTO DIFF WBC: CPT | Mod: HCNC | Performed by: STUDENT IN AN ORGANIZED HEALTH CARE EDUCATION/TRAINING PROGRAM

## 2024-03-29 PROCEDURE — 25000003 PHARM REV CODE 250: Mod: HCNC | Performed by: INTERNAL MEDICINE

## 2024-03-29 PROCEDURE — 63600175 PHARM REV CODE 636 W HCPCS: Mod: HCNC | Performed by: HOSPITALIST

## 2024-03-29 PROCEDURE — 80100016 HC MAINTENANCE HEMODIALYSIS: Mod: HCNC

## 2024-03-29 PROCEDURE — 25000003 PHARM REV CODE 250: Mod: HCNC | Performed by: HOSPITALIST

## 2024-03-29 PROCEDURE — 36415 COLL VENOUS BLD VENIPUNCTURE: CPT | Mod: HCNC | Performed by: HOSPITALIST

## 2024-03-29 PROCEDURE — 94761 N-INVAS EAR/PLS OXIMETRY MLT: CPT | Mod: HCNC

## 2024-03-29 PROCEDURE — 25000003 PHARM REV CODE 250: Mod: HCNC | Performed by: STUDENT IN AN ORGANIZED HEALTH CARE EDUCATION/TRAINING PROGRAM

## 2024-03-29 PROCEDURE — 84100 ASSAY OF PHOSPHORUS: CPT | Mod: HCNC | Performed by: INTERNAL MEDICINE

## 2024-03-29 PROCEDURE — 21400001 HC TELEMETRY ROOM: Mod: HCNC

## 2024-03-29 PROCEDURE — 63600175 PHARM REV CODE 636 W HCPCS: Mod: HCNC | Performed by: STUDENT IN AN ORGANIZED HEALTH CARE EDUCATION/TRAINING PROGRAM

## 2024-03-29 PROCEDURE — 99223 1ST HOSP IP/OBS HIGH 75: CPT | Mod: HCNC,,, | Performed by: STUDENT IN AN ORGANIZED HEALTH CARE EDUCATION/TRAINING PROGRAM

## 2024-03-29 PROCEDURE — A4216 STERILE WATER/SALINE, 10 ML: HCPCS | Mod: HCNC | Performed by: HOSPITALIST

## 2024-03-29 PROCEDURE — 80202 ASSAY OF VANCOMYCIN: CPT | Mod: HCNC | Performed by: HOSPITALIST

## 2024-03-29 RX ADMIN — HYDROMORPHONE HYDROCHLORIDE 0.5 MG: 1 INJECTION, SOLUTION INTRAMUSCULAR; INTRAVENOUS; SUBCUTANEOUS at 08:03

## 2024-03-29 RX ADMIN — ACETAMINOPHEN 650 MG: 325 TABLET ORAL at 10:03

## 2024-03-29 RX ADMIN — Medication 10 ML: at 02:03

## 2024-03-29 RX ADMIN — EPOETIN ALFA-EPBX 5000 UNITS: 10000 INJECTION, SOLUTION INTRAVENOUS; SUBCUTANEOUS at 08:03

## 2024-03-29 RX ADMIN — AMIODARONE HYDROCHLORIDE 400 MG: 200 TABLET ORAL at 08:03

## 2024-03-29 RX ADMIN — FINASTERIDE 5 MG: 5 TABLET, FILM COATED ORAL at 08:03

## 2024-03-29 RX ADMIN — DOCUSATE SODIUM AND SENNOSIDES 1 TABLET: 8.6; 5 TABLET ORAL at 08:03

## 2024-03-29 RX ADMIN — MIDODRINE HYDROCHLORIDE 10 MG: 5 TABLET ORAL at 08:03

## 2024-03-29 RX ADMIN — APIXABAN 5 MG: 5 TABLET, FILM COATED ORAL at 08:03

## 2024-03-29 RX ADMIN — Medication 10 ML: at 06:03

## 2024-03-29 RX ADMIN — MIDODRINE HYDROCHLORIDE 10 MG: 5 TABLET ORAL at 02:03

## 2024-03-29 RX ADMIN — HYDROMORPHONE HYDROCHLORIDE 0.5 MG: 1 INJECTION, SOLUTION INTRAMUSCULAR; INTRAVENOUS; SUBCUTANEOUS at 07:03

## 2024-03-29 RX ADMIN — MEROPENEM 1 G: 1 INJECTION INTRAVENOUS at 06:03

## 2024-03-29 RX ADMIN — Medication 10 ML: at 10:03

## 2024-03-29 RX ADMIN — ATORVASTATIN CALCIUM 80 MG: 40 TABLET, FILM COATED ORAL at 08:03

## 2024-03-29 RX ADMIN — NEPHROCAP 1 CAPSULE: 1 CAP ORAL at 08:03

## 2024-03-29 RX ADMIN — VANCOMYCIN HYDROCHLORIDE 500 MG: 500 INJECTION, POWDER, LYOPHILIZED, FOR SOLUTION INTRAVENOUS at 02:03

## 2024-03-29 RX ADMIN — TAMSULOSIN HYDROCHLORIDE 0.8 MG: 0.4 CAPSULE ORAL at 08:03

## 2024-03-29 RX ADMIN — INSULIN ASPART 2 UNITS: 100 INJECTION, SOLUTION INTRAVENOUS; SUBCUTANEOUS at 04:03

## 2024-03-29 NOTE — ASSESSMENT & PLAN NOTE
Spiked fever on 3/27 despite being on antibiotics for 5 days for right ankle cellulitis. ID consulted. CT ankle with no obvious abscess. Possibly right arm from infiltration. Blood cultures negative to date.

## 2024-03-29 NOTE — CONSULTS
[facility] - Med Surg  Infectious Disease  Consult Note    Patient Name: Miguel Moore  MRN: [id number]  Admission Date: 3/20/2024  Hospital Length of Stay: 9 days  Attending Physician: Bonifacio Reyes MD  Primary Care Provider: Raciel Raymond MD     Isolation Status: No active isolations    Patient information was obtained from patient, past medical records, ER records, and primary team.      Inpatient consult to Infectious Diseases  Consult performed by: Laney Underwood MD  Consult ordered by: Bonifacio Reyes MD        Assessment/Plan:     Other  Fever  I independently reviewed patient's lab work and images as documented. 67 yo male with ESRD on HD, DM and CVA with R sided weakness admitted for fatigue, hyperkalemia, found to have Afib with RVR. Admission course notable for R ankle cellulitis, CT with extensive subcutaneous edema, no focal fluid collection or OM seen. He spiked a fever on 3/27 and 3/28 - blood cx obtained ngtd. TTE on 3/20 without IE. Suspect fevers to be due to infiltrated RUE line vs thrombophlebitis vs cdiff. Mild swelling around ankle, pt reported interval improvement in pain and swelling since admission.    Recommendations  -remove old PIV  -continue empiric vancomycin pharm to dose and meropenem, if cx from 3/27 no growth for ESBL, would stop meropenem  -monitor stool output, if >3watery diarrhea (not on laxatives), consider Cdiff testing as pt has been on broad spectrum abx therapy  -US RUE to evaluate for potential clot/thrombophlebitis        Thank you for your consult. I will follow-up with patient. Please contact us if you have any additional questions. Above d/w primary team and nursing.       Laney Underwood MD  Infectious Disease  [facility] - Med Surg    Subjective:     Principal Problem: Paroxysmal atrial fibrillation with RVR    HPI: 67 yo male with ESRD on HD, DM and CVA with R sided weakness admitted for fatigue, hyperkalemia, found to have Afib with RVR. ID consulted  for fevers. Admission course notable for R ankle cellulitis, CT with extensive subcutaneous edema, no focal fluid collection or OM seen. He spiked a fever on 3/27 and 3/28 - blood cx obtained ngtd. TTE on 3/20 without IE. Pt is currently on vancomycin and meropenem. Prior to admission, pt reported he had pain in R ankle, denied fevers chills, sick contacts. He states that since he's been here, he has had some loose stools and RUE swelling. Reported hives with PCN in ithe past, tolerated carb/cephs.     Past Medical History:   Diagnosis Date    Allergy     Clotting disorder     Elaquis and APlavix    Deep vein thrombosis     Diabetes mellitus, type 2     Hypertension     Nuclear sclerosis of both eyes 6/9/2017    Renal disorder     Seizures     Stroke     Urinary tract infection        Past Surgical History:   Procedure Laterality Date    CATARACT EXTRACTION W/  INTRAOCULAR LENS IMPLANT Right 10/05/2017    Dr. Tay    CATARACT EXTRACTION W/  INTRAOCULAR LENS IMPLANT Left 10/19/2017    Dr. Tay    CYSTOSCOPY W/ RETROGRADES Bilateral 2/18/2021    Procedure: CYSTOSCOPY, WITH RETROGRADE PYELOGRAM;  Surgeon: JEMMA Styles MD;  Location: [facility] OR;  Service: Urology;  Laterality: Bilateral;  REQUESTING EARLY AS POSSIBE-LO / 2/8/2021 @ 1:13PM  RN Pre Op 2-11-21, Covid NEGATIVE ON  2-17-21.  C A    ESOPHAGOGASTRODUODENOSCOPY N/A 8/17/2020    Procedure: EGD (ESOPHAGOGASTRODUODENOSCOPY);  Surgeon: Desmond Chapman MD;  Location: [facility] ENDO;  Service: Endoscopy;  Laterality: N/A;    EYE SURGERY Bilateral     cataract    FEMORAL ARTERY STENT      FRACTURE SURGERY      KNEE SURGERY      bilateral scope       Review of patient's allergies indicates:   Allergen Reactions    Ace inhibitors      Hyperkalemia 8/2018  Other reaction(s): Unknown    Penicillins Hives    Tizanidine      Felt hot       Medications:  Medications Prior to Admission   Medication Sig    amLODIPine (NORVASC) 10 MG tablet Take 1 tablet by mouth  once daily    apixaban (ELIQUIS) 5 mg Tab Take 1 tablet (5 mg total) by mouth 2 (two) times daily.    aspirin (ECOTRIN) 81 MG EC tablet Take 1 tablet (81 mg total) by mouth once daily.    atorvastatin (LIPITOR) 80 MG tablet Take 1 tablet (80 mg total) by mouth once daily.    finasteride (PROSCAR) 5 mg tablet Take 1 tablet (5 mg total) by mouth once daily.    iron sucrose in NS (VENOFER) 100 mg/100 mL PgBk 50 mg.    labetaloL (NORMODYNE) 100 MG tablet Take 1 tablet (100 mg total) by mouth once daily.    methoxy peg-epoetin beta (MIRCERA INJ) 75 mcg.    mupirocin (BACTROBAN) 2 % ointment Apply to affected area 3 times daily    RENVELA 800 mg Tab Take 1 tablet (800 mg total) by mouth 3 (three) times daily with meals.    sildenafiL (VIAGRA) 100 MG tablet Take 1 tablet (100 mg total) by mouth daily as needed.    tamsulosin (FLOMAX) 0.4 mg Cap Take 2 capsules (0.8 mg total) by mouth once daily.     Antibiotics (From admission, onward)      Start     Stop Route Frequency Ordered    03/29/24 0600  meropenem (MERREM) 1 g in sodium chloride 0.9 % 100 mL IVPB (MB+)         -- IV Every 24 hours (non-standard times) 03/28/24 1553    03/23/24 1106  vancomycin - pharmacy to dose  (vancomycin IVPB (PEDS and ADULTS))        See Hyperspace for full Linked Orders Report.    -- IV pharmacy to manage frequency 03/23/24 1007          Antifungals (From admission, onward)      None          Antivirals (From admission, onward)      None             Immunization History   Administered Date(s) Administered    COVID-19, MRNA, LN-S, PF (MODERNA FULL 0.5 ML DOSE) 02/11/2021, 02/11/2021, 03/12/2021, 03/12/2021, 11/10/2021, 11/10/2021    Hepatitis B 04/11/2022, 06/13/2022, 07/11/2022, 08/08/2022    Hepatitis B (recombinant) Adjuvanted, 2 dose 08/08/2022    Hepatitis B, Adult 02/08/2021, 02/08/2021, 03/08/2021, 03/08/2021, 04/12/2021, 08/09/2021    Hepatitis B, Dialysis, 3 Dose 02/08/2021, 03/08/2021, 04/12/2021, 08/09/2021    Influenza (FLUBLOK) -  Quadrivalent - Recombinant - PF *Preferred* (egg allergy) 10/13/2023    Influenza - High Dose - PF (65 years and older) 01/17/2020, 09/30/2020, 10/18/2021    Influenza - Quadrivalent - High Dose - PF (65 years and older) 10/18/2021, 09/28/2022    Influenza - Quadrivalent - PF (6-35 months) 10/01/2020    Influenza - Quadrivalent - PF *Preferred* (6 months and older) 01/04/2019    PPD Test 08/24/2020, 03/24/2024    Pneumococcal Conjugate - 13 Valent 09/09/2019    Pneumococcal Conjugate - 20 Valent 08/18/2022    Pneumococcal Polysaccharide - 23 Valent 05/08/2017    Tdap 05/08/2017    Zoster Recombinant 07/31/2020       Family History       Problem Relation (Age of Onset)    Cataracts Mother    Diabetes Mother    Heart disease Mother    Hypertension Mother, Sister    No Known Problems Father, Brother, Maternal Aunt, Maternal Uncle, Paternal Aunt, Paternal Uncle, Maternal Grandmother, Maternal Grandfather, Paternal Grandmother, Paternal Grandfather, Daughter, Other          Social History     Socioeconomic History    Marital status: Single   Occupational History    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, etc   Tobacco Use    Smoking status: Never    Smokeless tobacco: Never   Substance and Sexual Activity    Alcohol use: No    Drug use: No   Social History Narrative    Lives with daughter     Review of Systems   Constitutional:  Negative for chills and fever.   Gastrointestinal:  Positive for diarrhea.   Genitourinary:  Negative for dysuria.   All other systems reviewed and are negative.    Objective:     Vital Signs (Most Recent):  Temp: 99.1 °F (37.3 °C) (03/29/24 0714)  Pulse: 79 (03/29/24 0817)  Resp: 17 (03/29/24 0817)  BP: 118/66 (03/29/24 0714)  SpO2: (!) 94 % (03/29/24 0817) Vital Signs (24h Range):  Temp:  [98 °F (36.7 °C)-102.8 °F (39.3 °C)] 99.1 °F (37.3 °C)  Pulse:  [76-96] 79  Resp:  [17-20] 17  SpO2:  [89 %-94 %] 94 %  BP: (118-137)/(57-66) 118/66     Weight: 83.5 kg (184 lb 1.4 oz)  Body  mass index is 27.99 kg/m².    Estimated Creatinine Clearance: 8.8 mL/min (A) (based on SCr of 8.3 mg/dL (H)).     Physical Exam  Constitutional:       General: He is not in acute distress.     Appearance: He is not ill-appearing or toxic-appearing.   HENT:      Mouth/Throat:      Mouth: Mucous membranes are moist.      Comments: Poor dentition  Eyes:      General:         Right eye: No discharge.         Left eye: No discharge.   Pulmonary:      Effort: Pulmonary effort is normal. No respiratory distress.   Abdominal:      General: There is no distension.      Palpations: Abdomen is soft.      Tenderness: There is no abdominal tenderness.   Musculoskeletal:         General: Swelling (RUE) present.      Right lower leg: Edema (mild ankle edema, mild TTP) present.   Skin:     General: Skin is warm.      Comments: R anterior LLE lesion - dry, no drainage, erythema or induration  Infiltrated RUE PIV  LAVF   Neurological:      Mental Status: He is alert.          Significant Labs:   Microbiology Results (last 7 days)       Procedure Component Value Units Date/Time    Blood culture [[id number]] Collected: 03/27/24 1727    Order Status: Completed Specimen: Blood from Peripheral, Hand, Right Updated: 03/28/24 1903     Blood Culture, Routine No Growth to date      No Growth to date    Blood culture [[id number]] Collected: 03/27/24 1719    Order Status: Completed Specimen: Blood from Peripheral, Wrist, Right Updated: 03/28/24 1712     Blood Culture, Routine No Growth to date    Blood culture [[id number]] Collected: 03/23/24 1614    Order Status: Completed Specimen: Blood from Peripheral, Hand, Left Updated: 03/27/24 1703     Blood Culture, Routine No Growth after 4 days.    Blood culture [[id number]] Collected: 03/23/24 1615    Order Status: Completed Specimen: Blood Updated: 03/27/24 1703     Blood Culture, Routine No Growth after 4 days.    Blood Culture #2 **CANNOT BE ORDERED STAT** [[id number]] Collected: 03/20/24 9610     Order Status: Completed Specimen: Blood from Peripheral, Hand, Right Updated: 03/24/24 1703     Blood Culture, Routine No Growth after 4 days.    Blood Culture #1 **CANNOT BE ORDERED STAT** [[id number]] Collected: 03/20/24 1449    Order Status: Completed Specimen: Blood from Peripheral, Hand, Right Updated: 03/24/24 1703     Blood Culture, Routine No Growth after 4 days.            Significant Imaging: I have reviewed all pertinent imaging results/findings within the past 24 hours.

## 2024-03-29 NOTE — HPI
67 yo male with ESRD on HD, DM and CVA with R sided weakness admitted for fatigue, hyperkalemia, found to have Afib with RVR. ID consulted for fevers. Admission course notable for R ankle cellulitis, CT with extensive subcutaneous edema, no focal fluid collection or OM seen. He spiked a fever on 3/27 and 3/28 - blood cx obtained ngtd. TTE on 3/20 without IE. Pt is currently on vancomycin and meropenem. Prior to admission, pt reported he had pain in R ankle, denied fevers chills, sick contacts. He states that since he's been here, he has had some loose stools and RUE swelling. Reported hives with PCN in ithe past, tolerated carb/cephs.

## 2024-03-29 NOTE — PT/OT/SLP PROGRESS
Missed Physical Therapy      Patient Name:  Miguel Moore   MRN:  [id number]    Patient not seen today secondary to Dialysis.     Will follow-up at later day.    Forrest Rueda, PT  3/29/2024

## 2024-03-29 NOTE — ASSESSMENT & PLAN NOTE
I independently reviewed patient's lab work and images as documented. 67 yo male with ESRD on HD, DM and CVA with R sided weakness admitted for fatigue, hyperkalemia, found to have Afib with RVR. Admission course notable for R ankle cellulitis, CT with extensive subcutaneous edema, no focal fluid collection or OM seen. He spiked a fever on 3/27 and 3/28 - blood cx obtained ngtd. TTE on 3/20 without IE. Suspect fevers to be due to infiltrated RUE line vs thrombophlebitis vs cdiff. Mild swelling around ankle, pt reported interval improvement in pain and swelling since admission.    Recommendations  -remove old PIV  -continue empiric vancomycin pharm to dose and meropenem, if cx from 3/27 no growth for ESBL, would stop meropenem  -monitor stool output, if >3watery diarrhea (not on laxatives), consider Cdiff testing as pt has been on broad spectrum abx therapy  -US RUE to evaluate for potential clot/thrombophlebitis

## 2024-03-29 NOTE — PROGRESS NOTES
Pharmacokinetic Assessment Follow Up: IV Vancomycin    Vancomycin serum concentration assessment(s):    The random level was drawn correctly and can be used to guide therapy at this time. The measurement is within the desired definitive target range of 10 to 20 mcg/mL.    Vancomycin Regimen Plan:    Give 500 mg after dialysis today.   Re-dose when the random level is less than 20 mcg/mL, next level to be drawn at 0400 on 4/1/24    Drug levels (last 3 results):  Recent Labs   Lab Result Units 03/27/24  0345 03/29/24  0508   Vancomycin, Random ug/mL 14.8 15.1       Pharmacy will continue to follow and monitor vancomycin.    Please contact pharmacy at extension [phone] for questions regarding this assessment.    Thank you for the consult,   Traci Tong       Patient brief summary:  Miguel Moore is a 68 y.o. male initiated on antimicrobial therapy with IV Vancomycin for treatment of skin & soft tissue infection    The patient's current regimen is pulse dose post dialysis    Drug Allergies:   Review of patient's allergies indicates:   Allergen Reactions    Ace inhibitors      Hyperkalemia 8/2018  Other reaction(s): Unknown    Penicillins Hives    Tizanidine      Felt hot       Actual Body Weight:   83.5 kg     Renal Function:   Estimated Creatinine Clearance: 8.8 mL/min (A) (based on SCr of 8.3 mg/dL (H)).,     Dialysis Method (if applicable):  intermittent HD mwf    CBC (last 72 hours):  Recent Labs   Lab Result Units 03/27/24  0345 03/28/24  0657 03/29/24  0508   WBC K/uL 14.30* 14.53* 14.49*   Hemoglobin g/dL 9.7* 8.9* 8.9*   Hematocrit % 25.1* 23.8* 24.7*   Platelets K/uL 210 233 264   Gran % % 87.9* 84.9* 83.5*   Lymph % % 3.6* 6.1* 5.6*   Mono % % 6.4 7.0 8.5   Eosinophil % % 0.3 0.3 0.3   Basophil % % 0.1 0.3 0.3   Differential Method  Automated Automated Automated       Metabolic Panel (last 72 hours):  Recent Labs   Lab Result Units 03/27/24  0345 03/29/24  0508   Sodium mmol/L 134*  --    Potassium mmol/L  4.1  --    Chloride mmol/L 100  --    CO2 mmol/L 25  --    Glucose mg/dL 149*  --    BUN mg/dL 39*  --    Creatinine mg/dL 8.3*  --    Phosphorus mg/dL 1.4* 3.5       Vancomycin Administrations:  vancomycin given in the last 96 hours                     vancomycin (VANCOCIN) 500 mg in dextrose 5 % in water (D5W) 100 mL IVPB (MB+) (mg) 500 mg New Bag 03/27/24 1416    vancomycin (VANCOCIN) 500 mg in dextrose 5 % in water (D5W) 100 mL IVPB (MB+) (mg) 500 mg New Bag 03/25/24 1658                    Microbiologic Results:  Microbiology Results (last 7 days)       Procedure Component Value Units Date/Time    Blood culture [[id number]] Collected: 03/27/24 1727    Order Status: Completed Specimen: Blood from Peripheral, Hand, Right Updated: 03/28/24 1903     Blood Culture, Routine No Growth to date      No Growth to date    Blood culture [[id number]] Collected: 03/27/24 1719    Order Status: Completed Specimen: Blood from Peripheral, Wrist, Right Updated: 03/28/24 1712     Blood Culture, Routine No Growth to date    Blood culture [[id number]] Collected: 03/23/24 1614    Order Status: Completed Specimen: Blood from Peripheral, Hand, Left Updated: 03/27/24 1703     Blood Culture, Routine No Growth after 4 days.    Blood culture [[id number]] Collected: 03/23/24 1615    Order Status: Completed Specimen: Blood Updated: 03/27/24 1703     Blood Culture, Routine No Growth after 4 days.    Blood Culture #2 **CANNOT BE ORDERED STAT** [[id number]] Collected: 03/20/24 1450    Order Status: Completed Specimen: Blood from Peripheral, Hand, Right Updated: 03/24/24 1703     Blood Culture, Routine No Growth after 4 days.    Blood Culture #1 **CANNOT BE ORDERED STAT** [[id number]] Collected: 03/20/24 1449    Order Status: Completed Specimen: Blood from Peripheral, Hand, Right Updated: 03/24/24 1703     Blood Culture, Routine No Growth after 4 days.           No

## 2024-03-29 NOTE — SUBJECTIVE & OBJECTIVE
Interval History: still febrile, ID consulted. Noted swelling and IV infiltration of right arm. U/s ordered for further eval. Right ankle is not hurting today    Review of Systems  Objective:     Vital Signs (Most Recent):  Temp: 97 °F (36.1 °C) (03/29/24 1315)  Pulse: 93 (03/29/24 1315)  Resp: 16 (03/29/24 1315)  BP: (!) 111/54 (03/29/24 1315)  SpO2: (!) 94 % (03/29/24 0817) Vital Signs (24h Range):  Temp:  [97 °F (36.1 °C)-102.8 °F (39.3 °C)] 97 °F (36.1 °C)  Pulse:  [76-96] 93  Resp:  [16-20] 16  SpO2:  [89 %-94 %] 94 %  BP: (111-136)/(54-69) 111/54     Weight: 83.5 kg (184 lb 1.4 oz)  Body mass index is 27.99 kg/m².    Intake/Output Summary (Last 24 hours) at 3/29/2024 1443  Last data filed at 3/29/2024 1326  Gross per 24 hour   Intake 580.62 ml   Output --   Net 580.62 ml           Physical Exam  Vitals and nursing note reviewed.   Constitutional:       General: He is not in acute distress.     Appearance: He is ill-appearing.   Cardiovascular:      Rate and Rhythm: Normal rate.      Pulses: Normal pulses.   Pulmonary:      Effort: Pulmonary effort is normal. No respiratory distress.      Breath sounds: No wheezing.   Abdominal:      General: Bowel sounds are normal.   Musculoskeletal:         General: Swelling present.      Comments: Right ankle with area of fluctuation. Right forearm with swelling, bandaged from iv infilatration site   Skin:     General: Skin is warm and dry.   Neurological:      Mental Status: He is alert. Mental status is at baseline.   Psychiatric:         Mood and Affect: Mood normal.         Thought Content: Thought content normal.             Significant Labs: All pertinent labs within the past 24 hours have been reviewed.    Significant Imaging: I have reviewed all pertinent imaging results/findings within the past 24 hours.

## 2024-03-29 NOTE — SUBJECTIVE & OBJECTIVE
Past Medical History:   Diagnosis Date    Allergy     Clotting disorder     Elaquis and APlavix    Deep vein thrombosis     Diabetes mellitus, type 2     Hypertension     Nuclear sclerosis of both eyes 6/9/2017    Renal disorder     Seizures     Stroke     Urinary tract infection        Past Surgical History:   Procedure Laterality Date    CATARACT EXTRACTION W/  INTRAOCULAR LENS IMPLANT Right 10/05/2017    Dr. Tay    CATARACT EXTRACTION W/  INTRAOCULAR LENS IMPLANT Left 10/19/2017    Dr. Tay    CYSTOSCOPY W/ RETROGRADES Bilateral 2/18/2021    Procedure: CYSTOSCOPY, WITH RETROGRADE PYELOGRAM;  Surgeon: JEMMA Styles MD;  Location: [facility] OR;  Service: Urology;  Laterality: Bilateral;  REQUESTING EARLY AS POSSIBE-LO / 2/8/2021 @ 1:13PM  RN Pre Op 2-11-21, Covid NEGATIVE ON  2-17-21.  C A    ESOPHAGOGASTRODUODENOSCOPY N/A 8/17/2020    Procedure: EGD (ESOPHAGOGASTRODUODENOSCOPY);  Surgeon: Desmond Chapman MD;  Location: [facility] ENDO;  Service: Endoscopy;  Laterality: N/A;    EYE SURGERY Bilateral     cataract    FEMORAL ARTERY STENT      FRACTURE SURGERY      KNEE SURGERY      bilateral scope       Review of patient's allergies indicates:   Allergen Reactions    Ace inhibitors      Hyperkalemia 8/2018  Other reaction(s): Unknown    Penicillins Hives    Tizanidine      Felt hot       Medications:  Medications Prior to Admission   Medication Sig    amLODIPine (NORVASC) 10 MG tablet Take 1 tablet by mouth once daily    apixaban (ELIQUIS) 5 mg Tab Take 1 tablet (5 mg total) by mouth 2 (two) times daily.    aspirin (ECOTRIN) 81 MG EC tablet Take 1 tablet (81 mg total) by mouth once daily.    atorvastatin (LIPITOR) 80 MG tablet Take 1 tablet (80 mg total) by mouth once daily.    finasteride (PROSCAR) 5 mg tablet Take 1 tablet (5 mg total) by mouth once daily.    iron sucrose in NS (VENOFER) 100 mg/100 mL PgBk 50 mg.    labetaloL (NORMODYNE) 100 MG tablet Take 1 tablet (100 mg total) by mouth once daily.     methoxy peg-epoetin beta (MIRCERA INJ) 75 mcg.    mupirocin (BACTROBAN) 2 % ointment Apply to affected area 3 times daily    RENVELA 800 mg Tab Take 1 tablet (800 mg total) by mouth 3 (three) times daily with meals.    sildenafiL (VIAGRA) 100 MG tablet Take 1 tablet (100 mg total) by mouth daily as needed.    tamsulosin (FLOMAX) 0.4 mg Cap Take 2 capsules (0.8 mg total) by mouth once daily.     Antibiotics (From admission, onward)      Start     Stop Route Frequency Ordered    03/29/24 0600  meropenem (MERREM) 1 g in sodium chloride 0.9 % 100 mL IVPB (MB+)         -- IV Every 24 hours (non-standard times) 03/28/24 1553    03/23/24 1106  vancomycin - pharmacy to dose  (vancomycin IVPB (PEDS and ADULTS))        See Hyperspace for full Linked Orders Report.    -- IV pharmacy to manage frequency 03/23/24 1007          Antifungals (From admission, onward)      None          Antivirals (From admission, onward)      None             Immunization History   Administered Date(s) Administered    COVID-19, MRNA, LN-S, PF (MODERNA FULL 0.5 ML DOSE) 02/11/2021, 02/11/2021, 03/12/2021, 03/12/2021, 11/10/2021, 11/10/2021    Hepatitis B 04/11/2022, 06/13/2022, 07/11/2022, 08/08/2022    Hepatitis B (recombinant) Adjuvanted, 2 dose 08/08/2022    Hepatitis B, Adult 02/08/2021, 02/08/2021, 03/08/2021, 03/08/2021, 04/12/2021, 08/09/2021    Hepatitis B, Dialysis, 3 Dose 02/08/2021, 03/08/2021, 04/12/2021, 08/09/2021    Influenza (FLUBLOK) - Quadrivalent - Recombinant - PF *Preferred* (egg allergy) 10/13/2023    Influenza - High Dose - PF (65 years and older) 01/17/2020, 09/30/2020, 10/18/2021    Influenza - Quadrivalent - High Dose - PF (65 years and older) 10/18/2021, 09/28/2022    Influenza - Quadrivalent - PF (6-35 months) 10/01/2020    Influenza - Quadrivalent - PF *Preferred* (6 months and older) 01/04/2019    PPD Test 08/24/2020, 03/24/2024    Pneumococcal Conjugate - 13 Valent 09/09/2019    Pneumococcal Conjugate - 20 Valent  08/18/2022    Pneumococcal Polysaccharide - 23 Valent 05/08/2017    Tdap 05/08/2017    Zoster Recombinant 07/31/2020       Family History       Problem Relation (Age of Onset)    Cataracts Mother    Diabetes Mother    Heart disease Mother    Hypertension Mother, Sister    No Known Problems Father, Brother, Maternal Aunt, Maternal Uncle, Paternal Aunt, Paternal Uncle, Maternal Grandmother, Maternal Grandfather, Paternal Grandmother, Paternal Grandfather, Daughter, Other          Social History     Socioeconomic History    Marital status: Single   Occupational History    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, etc   Tobacco Use    Smoking status: Never    Smokeless tobacco: Never   Substance and Sexual Activity    Alcohol use: No    Drug use: No   Social History Narrative    Lives with daughter     Review of Systems   Constitutional:  Negative for chills and fever.   Gastrointestinal:  Positive for diarrhea.   Genitourinary:  Negative for dysuria.   All other systems reviewed and are negative.    Objective:     Vital Signs (Most Recent):  Temp: 99.1 °F (37.3 °C) (03/29/24 0714)  Pulse: 79 (03/29/24 0817)  Resp: 17 (03/29/24 0817)  BP: 118/66 (03/29/24 0714)  SpO2: (!) 94 % (03/29/24 0817) Vital Signs (24h Range):  Temp:  [98 °F (36.7 °C)-102.8 °F (39.3 °C)] 99.1 °F (37.3 °C)  Pulse:  [76-96] 79  Resp:  [17-20] 17  SpO2:  [89 %-94 %] 94 %  BP: (118-137)/(57-66) 118/66     Weight: 83.5 kg (184 lb 1.4 oz)  Body mass index is 27.99 kg/m².    Estimated Creatinine Clearance: 8.8 mL/min (A) (based on SCr of 8.3 mg/dL (H)).     Physical Exam  Constitutional:       General: He is not in acute distress.     Appearance: He is not ill-appearing or toxic-appearing.   HENT:      Mouth/Throat:      Mouth: Mucous membranes are moist.      Comments: Poor dentition  Eyes:      General:         Right eye: No discharge.         Left eye: No discharge.   Pulmonary:      Effort: Pulmonary effort is normal. No respiratory  distress.   Abdominal:      General: There is no distension.      Palpations: Abdomen is soft.      Tenderness: There is no abdominal tenderness.   Musculoskeletal:         General: Swelling (RUE) present.      Right lower leg: Edema (mild ankle edema, mild TTP) present.   Skin:     General: Skin is warm.      Comments: R anterior LLE lesion - dry, no drainage, erythema or induration  Infiltrated RUE PIV  LAVF   Neurological:      Mental Status: He is alert.          Significant Labs:   Microbiology Results (last 7 days)       Procedure Component Value Units Date/Time    Blood culture [[id number]] Collected: 03/27/24 1727    Order Status: Completed Specimen: Blood from Peripheral, Hand, Right Updated: 03/28/24 1903     Blood Culture, Routine No Growth to date      No Growth to date    Blood culture [[id number]] Collected: 03/27/24 1719    Order Status: Completed Specimen: Blood from Peripheral, Wrist, Right Updated: 03/28/24 1712     Blood Culture, Routine No Growth to date    Blood culture [[id number]] Collected: 03/23/24 1614    Order Status: Completed Specimen: Blood from Peripheral, Hand, Left Updated: 03/27/24 1703     Blood Culture, Routine No Growth after 4 days.    Blood culture [[id number]] Collected: 03/23/24 1615    Order Status: Completed Specimen: Blood Updated: 03/27/24 1703     Blood Culture, Routine No Growth after 4 days.    Blood Culture #2 **CANNOT BE ORDERED STAT** [[id number]] Collected: 03/20/24 1450    Order Status: Completed Specimen: Blood from Peripheral, Hand, Right Updated: 03/24/24 1703     Blood Culture, Routine No Growth after 4 days.    Blood Culture #1 **CANNOT BE ORDERED STAT** [[id number]] Collected: 03/20/24 1449    Order Status: Completed Specimen: Blood from Peripheral, Hand, Right Updated: 03/24/24 1703     Blood Culture, Routine No Growth after 4 days.            Significant Imaging: I have reviewed all pertinent imaging results/findings within the past 24 hours.

## 2024-03-29 NOTE — PROGRESS NOTES
68 y o m with hx of esrd htn anemia 2nd hyperpth seen while on HD tolerating tx well    Denies CNS ENT CP GI  OR DERM SX  Past Medical History:   Diagnosis Date    Allergy     Clotting disorder     Elaquis and APlavix    Deep vein thrombosis     Diabetes mellitus, type 2     Hypertension     Nuclear sclerosis of both eyes 6/9/2017    Renal disorder     Seizures     Stroke     Urinary tract infection      Past Surgical History:   Procedure Laterality Date    CATARACT EXTRACTION W/  INTRAOCULAR LENS IMPLANT Right 10/05/2017    Dr. Tay    CATARACT EXTRACTION W/  INTRAOCULAR LENS IMPLANT Left 10/19/2017    Dr. Tay    CYSTOSCOPY W/ RETROGRADES Bilateral 2/18/2021    Procedure: CYSTOSCOPY, WITH RETROGRADE PYELOGRAM;  Surgeon: JEMMA Styles MD;  Location: [facility] OR;  Service: Urology;  Laterality: Bilateral;  REQUESTING EARLY AS POSSIBE-LO / 2/8/2021 @ 1:13PM  RN Pre Op 2-11-21, Covid NEGATIVE ON  2-17-21.  C A    ESOPHAGOGASTRODUODENOSCOPY N/A 8/17/2020    Procedure: EGD (ESOPHAGOGASTRODUODENOSCOPY);  Surgeon: Desmond Chapman MD;  Location: [facility] ENDO;  Service: Endoscopy;  Laterality: N/A;    EYE SURGERY Bilateral     cataract    FEMORAL ARTERY STENT      FRACTURE SURGERY      KNEE SURGERY      bilateral scope     Social History     Socioeconomic History    Marital status: Single   Occupational History    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, etc   Tobacco Use    Smoking status: Never    Smokeless tobacco: Never   Substance and Sexual Activity    Alcohol use: No    Drug use: No   Social History Narrative    Lives with daughter     Family History   Problem Relation Age of Onset    Diabetes Mother     Heart disease Mother     Hypertension Mother     Cataracts Mother     No Known Problems Father     Hypertension Sister     No Known Problems Brother     No Known Problems Maternal Aunt     No Known Problems Maternal Uncle     No Known Problems Paternal Aunt     No Known Problems Paternal  Uncle     No Known Problems Maternal Grandmother     No Known Problems Maternal Grandfather     No Known Problems Paternal Grandmother     No Known Problems Paternal Grandfather     No Known Problems Daughter     No Known Problems Other     Amblyopia Neg Hx     Blindness Neg Hx     Cancer Neg Hx     Glaucoma Neg Hx     Macular degeneration Neg Hx     Retinal detachment Neg Hx     Strabismus Neg Hx     Stroke Neg Hx     Thyroid disease Neg Hx        Review of patient's allergies indicates:   Allergen Reactions    Ace inhibitors      Hyperkalemia 8/2018  Other reaction(s): Unknown    Penicillins Hives    Tizanidine      Felt hot       Current Facility-Administered Medications   Medication    acetaminophen tablet 650 mg    amiodarone tablet 400 mg    apixaban tablet 5 mg    atorvastatin tablet 80 mg    dextrose 10% bolus 125 mL 125 mL    dextrose 10% bolus 250 mL 250 mL    diphenoxylate-atropine 2.5-0.025 mg per tablet 1 tablet    epoetin marisol-epbx injection 5,000 Units    finasteride tablet 5 mg    glucagon (human recombinant) injection 1 mg    glucose chewable tablet 16 g    glucose chewable tablet 24 g    HYDROmorphone injection 0.5 mg    insulin aspart U-100 pen 0-5 Units    melatonin tablet 6 mg    meropenem (MERREM) 1 g in sodium chloride 0.9 % 100 mL IVPB (MB+)    midodrine tablet 10 mg    naloxone 0.4 mg/mL injection 0.02 mg    ondansetron injection 4 mg    prochlorperazine injection Soln 5 mg    sodium chloride 0.9% bolus 250 mL 250 mL    sodium chloride 0.9% flush 10 mL    tamsulosin 24 hr capsule 0.8 mg    vancomycin (VANCOCIN) 500 mg in dextrose 5 % in water (D5W) 100 mL IVPB (MB+)    vancomycin - pharmacy to dose    vitamin renal formula (B-complex-vitamin c-folic acid) 1 mg per capsule 1 capsule       LABS    Recent Results (from the past 24 hour(s))   POCT glucose    Collection Time: 03/28/24  4:35 PM   Result Value Ref Range    POCT Glucose 134 (H) 70 - 110 mg/dL   Uric acid    Collection Time: 03/28/24   4:53 PM   Result Value Ref Range    Uric Acid 4.9 3.4 - 7.0 mg/dL   CK    Collection Time: 03/28/24  4:53 PM   Result Value Ref Range     (H) 20 - 200 U/L   POCT glucose    Collection Time: 03/28/24  7:50 PM   Result Value Ref Range    POCT Glucose 121 (H) 70 - 110 mg/dL   Vancomycin, random    Collection Time: 03/29/24  5:08 AM   Result Value Ref Range    Vancomycin, Random 15.1 Not established ug/mL   Phosphorus    Collection Time: 03/29/24  5:08 AM   Result Value Ref Range    Phosphorus 3.5 2.7 - 4.5 mg/dL   CBC Auto Differential    Collection Time: 03/29/24  5:08 AM   Result Value Ref Range    WBC 14.49 (H) 3.90 - 12.70 K/uL    RBC 3.60 (L) 4.60 - 6.20 M/uL    Hemoglobin 8.9 (L) 14.0 - 18.0 g/dL    Hematocrit 24.7 (L) 40.0 - 54.0 %    MCV 69 (L) 82 - 98 fL    MCH 24.7 (L) 27.0 - 31.0 pg    MCHC 36.0 32.0 - 36.0 g/dL    RDW 19.1 (H) 11.5 - 14.5 %    Platelets 264 150 - 450 K/uL    MPV 10.5 9.2 - 12.9 fL    Immature Granulocytes 1.8 (H) 0.0 - 0.5 %    Gran # (ANC) 12.1 (H) 1.8 - 7.7 K/uL    Immature Grans (Abs) 0.26 (H) 0.00 - 0.04 K/uL    Lymph # 0.8 (L) 1.0 - 4.8 K/uL    Mono # 1.2 (H) 0.3 - 1.0 K/uL    Eos # 0.0 0.0 - 0.5 K/uL    Baso # 0.05 0.00 - 0.20 K/uL    nRBC 0 0 /100 WBC    Gran % 83.5 (H) 38.0 - 73.0 %    Lymph % 5.6 (L) 18.0 - 48.0 %    Mono % 8.5 4.0 - 15.0 %    Eosinophil % 0.3 0.0 - 8.0 %    Basophil % 0.3 0.0 - 1.9 %    Differential Method Automated    POCT glucose    Collection Time: 03/29/24  7:12 AM   Result Value Ref Range    POCT Glucose 116 (H) 70 - 110 mg/dL   POCT glucose    Collection Time: 03/29/24 11:14 AM   Result Value Ref Range    POCT Glucose 113 (H) 70 - 110 mg/dL   ]    I/O last 3 completed shifts:  In: 1144.5 [P.O.:720; I.V.:10; IV Piggyback:414.5]  Out: -     Vitals:    03/29/24 1145 03/29/24 1215 03/29/24 1246 03/29/24 1315   BP: 121/62 123/64 127/65 (!) 111/54   Pulse: 90 91 92 93   Resp: 16 16 16 16   Temp:    97 °F (36.1 °C)   TempSrc:       SpO2:       Weight:        Height:           No Jvd, Thyromegaly or Lymphadenopathy  Lungs: Fairly clear anteriorly and laterally  Cor: RRR no G or rubs  Abd: Soft benign good bowel sounds non tender  Ext: no edema     A)    ESRD MWF   Htn  Anemia  2nd hypth po4 3.5 no need for binders right now  Dm  Hypoalb   Hx of high PSA     P)    HD MWF  Renal Diet  Home meds  Protect access  EPO prn (iron prn )  Binders prn   Adjust all meds to the degree of renal fx  Close follow up I/O and weights  Maintain Hydration   Antibiotx as needed

## 2024-03-29 NOTE — PLAN OF CARE
Problem: Adult Inpatient Plan of Care  Goal: Plan of Care Review  Outcome: Ongoing, Progressing  Flowsheets (Taken 3/29/2024 1737)  Plan of Care Reviewed With: patient  Goal: Patient-Specific Goal (Individualized)  Outcome: Ongoing, Progressing  Goal: Absence of Hospital-Acquired Illness or Injury  Outcome: Ongoing, Progressing  Intervention: Identify and Manage Fall Risk  Flowsheets (Taken 3/29/2024 1737)  Safety Promotion/Fall Prevention:   assistive device/personal item within reach   Fall Risk reviewed with patient/family   side rails raised x 2   high risk medications identified   nonskid shoes/socks when out of bed   room near unit station   instructed to call staff for mobility   medications reviewed   bed alarm set  Intervention: Prevent Skin Injury  Flowsheets (Taken 3/29/2024 1737)  Body Position:   position changed independently   weight shifting  Skin Protection:   adhesive use limited   tubing/devices free from skin contact  Intervention: Prevent and Manage VTE (Venous Thromboembolism) Risk  Flowsheets (Taken 3/29/2024 1737)  Activity Management:   Arm raise - L1   Ankle pumps - L1   Rolling - L1   Straight leg raise - L1  VTE Prevention/Management:   ambulation promoted   bleeding precations maintained   bleeding risk assessed  Range of Motion: active ROM (range of motion) encouraged  Intervention: Prevent Infection  Flowsheets (Taken 3/29/2024 1737)  Infection Prevention: hand hygiene promoted  Goal: Optimal Comfort and Wellbeing  Outcome: Ongoing, Progressing  Goal: Readiness for Transition of Care  Outcome: Ongoing, Progressing     Problem: Infection  Goal: Absence of Infection Signs and Symptoms  Outcome: Ongoing, Progressing  Intervention: Prevent or Manage Infection  Flowsheets (Taken 3/29/2024 1737)  Infection Management: aseptic technique maintained     Problem: Skin Injury Risk Increased  Goal: Skin Health and Integrity  Outcome: Ongoing, Progressing  Intervention: Optimize Skin  Protection  Flowsheets (Taken 3/29/2024 1737)  Pressure Reduction Techniques:   frequent weight shift encouraged   weight shift assistance provided  Pressure Reduction Devices:   foam padding utilized   pressure-redistributing mattress utilized  Skin Protection:   adhesive use limited   tubing/devices free from skin contact  Head of Bed (HOB) Positioning: HOB at 30-45 degrees     Problem: Impaired Wound Healing  Goal: Optimal Wound Healing  Outcome: Ongoing, Progressing     Problem: Fall Injury Risk  Goal: Absence of Fall and Fall-Related Injury  Outcome: Ongoing, Progressing  Intervention: Identify and Manage Contributors  Flowsheets (Taken 3/29/2024 1737)  Self-Care Promotion:   safe use of adaptive equipment encouraged   BADL personal objects within reach   independence encouraged   BADL personal routines maintained   meal set-up provided  Medication Review/Management:   medications reviewed   high-risk medications identified  Intervention: Promote Injury-Free Environment  Flowsheets (Taken 3/29/2024 1737)  Safety Promotion/Fall Prevention:   assistive device/personal item within reach   Fall Risk reviewed with patient/family   side rails raised x 2   high risk medications identified   nonskid shoes/socks when out of bed   room near unit station   instructed to call staff for mobility   medications reviewed   bed alarm set   Pt alert able to make needs known,bella meds well,IV abx remains in progress,no s/s adverse reaction noted,reposition self q 2hrs,pain controlled by prn pain medication,POC explained,remains free from falls and pressure injuries,safety maintained,continue monitoring.

## 2024-03-29 NOTE — PROGRESS NOTES
[facility] Medicine  Progress Note    Patient Name: Miguel Moore  MRN: [id number]  Patient Class: IP- Inpatient   Admission Date: 3/20/2024  Length of Stay: 9 days  Attending Physician: Bonifacio Reyes MD  Primary Care Provider: Raciel Raymond MD        Subjective:     Principal Problem:Paroxysmal atrial fibrillation with RVR        HPI:  Mr Miguel Moore is a 68 y.o. man with ESRD on HD, HTN, DM with neuropathy, history of CVA with residual R sided weakness, chronic DVT on eliquis who presented with feeling poorly. He attended his usual dialysis session on Monday 3/18 without issues. He developed fatigue and R ankle swelling. He has some pain with palpation but is able to walk. No reports of trauma. No wounds. No falls. No history of gout. Today he was feeling worse so did not go to dialysis and came to the ED. No fevers. No shortness of breath. Normal appetite. No nausea, vomiting. No chest pain. No palpitations.     In the ED, afebrile with HR initially controlled then to 130s Afib RVR. Started diltiazem but became hypotensive. Diltiazem stopped. Given antibiotics. Admitted for further management.     Overview/Hospital Course:  Mr Miguel Moore is a 68 y.o. man with ESRD on HD, DM who was admitted with new onset Afib RVR, hyperkalemia from missed HD session, and R ankle cellulitis. Started amio gtt with conversion to NSR. TTE EF 55-60%, mild LAE. Cardiology consulted. Now on PO amiodarone and continues home eliquis (on this for chronic DVT). He received HD with resolution of hyperkalemia. He is on CTX for his R ankle cellulitis and was given colchicine in case gout could contribute. Also added vanc,foot swelling is improving.This is improving. He developed recurrent Afib and amio gtt resumed. Cardiology was planning SONY/DCCV on 3/22. But converted to sinus. Transferred to floor. Right ankle cellulitis appears to be improving. Plan to complete 7 day course of antibiotics. PT/OT  recommending SNF however patient prefers home health.      Interval History: still febrile, ID consulted. Noted swelling and IV infiltration of right arm. U/s ordered for further eval. Right ankle is not hurting today    Review of Systems  Objective:     Vital Signs (Most Recent):  Temp: 97 °F (36.1 °C) (03/29/24 1315)  Pulse: 93 (03/29/24 1315)  Resp: 16 (03/29/24 1315)  BP: (!) 111/54 (03/29/24 1315)  SpO2: (!) 94 % (03/29/24 0817) Vital Signs (24h Range):  Temp:  [97 °F (36.1 °C)-102.8 °F (39.3 °C)] 97 °F (36.1 °C)  Pulse:  [76-96] 93  Resp:  [16-20] 16  SpO2:  [89 %-94 %] 94 %  BP: (111-136)/(54-69) 111/54     Weight: 83.5 kg (184 lb 1.4 oz)  Body mass index is 27.99 kg/m².    Intake/Output Summary (Last 24 hours) at 3/29/2024 1443  Last data filed at 3/29/2024 1326  Gross per 24 hour   Intake 580.62 ml   Output --   Net 580.62 ml           Physical Exam  Vitals and nursing note reviewed.   Constitutional:       General: He is not in acute distress.     Appearance: He is ill-appearing.   Cardiovascular:      Rate and Rhythm: Normal rate.      Pulses: Normal pulses.   Pulmonary:      Effort: Pulmonary effort is normal. No respiratory distress.      Breath sounds: No wheezing.   Abdominal:      General: Bowel sounds are normal.   Musculoskeletal:         General: Swelling present.      Comments: Right ankle with area of fluctuation. Right forearm with swelling, bandaged from iv infilatration site   Skin:     General: Skin is warm and dry.   Neurological:      Mental Status: He is alert. Mental status is at baseline.   Psychiatric:         Mood and Affect: Mood normal.         Thought Content: Thought content normal.             Significant Labs: All pertinent labs within the past 24 hours have been reviewed.    Significant Imaging: I have reviewed all pertinent imaging results/findings within the past 24 hours.    Assessment/Plan:      * Paroxysmal atrial fibrillation with RVR  Patient has Paroxysmal (<7 days)  atrial fibrillation which is uncontrolled. Patient is currently in atrial fibrillation.  - new onset Afib  - on labetalol for BP at home, eliquis for chronic DVT but did miss some doses of eliquis  - in ED given diltiazem gtt but that caused hypotension  - EKG with Afib RVR with normal Qtc  - started amiodarone with conversion to NSR. Changed to PO amiodarone  - however, he had recurrent Afib and was placed back on amio gtt.   - Cardiology consulted  - plan for SONY/DCCV on 3/22/24 but converted to sinus rhythm. Has been in/out of afib but rate controlled  - continue home eliquis    Lab Results   Component Value Date    TSH 2.672 03/20/2024     - TTE normal EF, mild LAE    [id number] - VASc score:  Congestive Heart Failure or EF < 35% NO   Hypertension YES  +1   Age >= 75 NO   Diabetes Mellitus YES  +1   Stroke/TIA prior history YES  +2   Vascular disease (PAD, MI or Aortic Plaque)? YES  +1   Age 65 - 74 YES  +1   Female NO   Total 6   Sinus at this time.      Fever  Spiked fever on 3/27 despite being on antibiotics for 5 days for right ankle cellulitis. ID consulted. CT ankle with no obvious abscess. Possibly right arm from infiltration. Blood cultures negative to date.      Cellulitis of right foot  R ankle pain, swelling, warmth present. Potential gout vs cellulitis. 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 No fracture present. Uric acid normal. Arterial US and venous US with no clots, appropriate flow  - on ceftriaxone/vanc for 7 days and still with pain and now with increased WBC and fevers  - blood cultures currently NGTD  - has trialed colchicine with no relief  - Has area of fluctuation on right lateral ankle, plan for CT scan of ankle for further evaluation - if abscess/arthritis will consult Orthopedic Surgery      Anemia in chronic kidney disease  Patient's anemia is currently controlled. Has not received any PRBCs to date. Etiology likely d/t chronic disease due to  ESRD  Current CBC reviewed-   Lab Results   Component Value Date    HGB 10.1 (L) 03/25/2024    HCT 26.1 (L) 03/25/2024     Monitor serial CBC and transfuse if patient becomes hemodynamically unstable, symptomatic or H/H drops below 7/21.    History of stroke  pT,OT.      Secondary hyperparathyroidism of renal origin  Continue renvela       ESRD (end stage renal disease)  ESRD via LUE AVF. Last session 3/18/24. Missed 3/20/24 due to fatigue. Usually MWF  - hyperkalemic on admit. Does not appear volume overloaded.   - Nephrology Dr Currie consulted. Received HD on 3/20. K normalized.   - continue MWF HD    Hemiplegia and hemiparesis following cerebral infarction affecting right dominant side  No signs of acute stroke. Does have RUE weakness.   - continue home asa, eliquis, statin      Seizure disorder as sequela of cerebrovascular accident  Not currently on antiepileptics. No seizure activity reported. Monitor.       Controlled type 2 diabetes mellitus with chronic kidney disease on chronic dialysis, with long-term current use of insulin  A1c:   Lab Results   Component Value Date    HGBA1C 5.8 (H) 03/20/2024     Meds: SSI PRN to maintain goal 140-180  ADA renal diet, accuchecks, hypoglycemic protocol      Dyslipidemia  Continue statin      Benign essential HTN  Chronic. Latest blood pressure and vitals reviewed-     Temp:  [97.4 °F (36.3 °C)-99.7 °F (37.6 °C)]   Pulse:  [81-93]   Resp:  [14-19]   BP: (102-135)/(51-69)   SpO2:  [86 %-97 %] .   Home meds for hypertension were reviewed and noted below.   Hypertension Medications               amLODIPine (NORVASC) 10 MG tablet Take 1 tablet by mouth once daily    labetaloL (NORMODYNE) 100 MG tablet Take 1 tablet (100 mg total) by mouth once daily.          - BP Rx held on admit due to hypotension  - BP improved. Resumed home Rx    Will utilize p.r.n. blood pressure medication only if patient's blood pressure greater than 180/110 and he develops symptoms such as worsening  chest pain or shortness of breath.      VTE Risk Mitigation (From admission, onward)           Ordered     apixaban tablet 5 mg  2 times daily         03/20/24 0490                    Discharge Planning   GARY:      Code Status: Full Code   Is the patient medically ready for discharge?:     Reason for patient still in hospital (select all that apply): Treatment  Discharge Plan A: Skilled Nursing Facility   Discharge Delays: None known at this time              Bonifacio Reyes MD  Department of Hospital Medicine   [facility]

## 2024-03-29 NOTE — NURSING
[facility], [facility]  Nurses Note -- 4 Eyes      3/29/2024       Skin assessed on: Q Shift      [x] No Pressure Injuries Present    []Prevention Measures Documented    [] Yes LDA  for Pressure Injury Previously documented     [] Yes New Pressure Injury Discovered   [] LDA for New Pressure Injury Added      Attending RN:  Tamara Dominguez LPN     Second RN:  DARRION Mckeon

## 2024-03-30 LAB
POCT GLUCOSE: 152 MG/DL (ref 70–110)
POCT GLUCOSE: 180 MG/DL (ref 70–110)
POCT GLUCOSE: 183 MG/DL (ref 70–110)
POCT GLUCOSE: 198 MG/DL (ref 70–110)

## 2024-03-30 PROCEDURE — 25000003 PHARM REV CODE 250: Mod: HCNC | Performed by: HOSPITALIST

## 2024-03-30 PROCEDURE — 25000003 PHARM REV CODE 250: Mod: HCNC | Performed by: INTERNAL MEDICINE

## 2024-03-30 PROCEDURE — 63600175 PHARM REV CODE 636 W HCPCS: Mod: HCNC | Performed by: INTERNAL MEDICINE

## 2024-03-30 PROCEDURE — 99232 SBSQ HOSP IP/OBS MODERATE 35: CPT | Mod: HCNC,,, | Performed by: STUDENT IN AN ORGANIZED HEALTH CARE EDUCATION/TRAINING PROGRAM

## 2024-03-30 PROCEDURE — 21400001 HC TELEMETRY ROOM: Mod: HCNC

## 2024-03-30 PROCEDURE — 63600175 PHARM REV CODE 636 W HCPCS: Mod: HCNC | Performed by: STUDENT IN AN ORGANIZED HEALTH CARE EDUCATION/TRAINING PROGRAM

## 2024-03-30 PROCEDURE — A4216 STERILE WATER/SALINE, 10 ML: HCPCS | Mod: HCNC | Performed by: HOSPITALIST

## 2024-03-30 RX ADMIN — ATORVASTATIN CALCIUM 80 MG: 40 TABLET, FILM COATED ORAL at 09:03

## 2024-03-30 RX ADMIN — MIDODRINE HYDROCHLORIDE 10 MG: 5 TABLET ORAL at 08:03

## 2024-03-30 RX ADMIN — NEPHROCAP 1 CAPSULE: 1 CAP ORAL at 09:03

## 2024-03-30 RX ADMIN — APIXABAN 5 MG: 5 TABLET, FILM COATED ORAL at 09:03

## 2024-03-30 RX ADMIN — TAMSULOSIN HYDROCHLORIDE 0.8 MG: 0.4 CAPSULE ORAL at 09:03

## 2024-03-30 RX ADMIN — MEROPENEM 1 G: 1 INJECTION INTRAVENOUS at 05:03

## 2024-03-30 RX ADMIN — HYDROMORPHONE HYDROCHLORIDE 0.5 MG: 1 INJECTION, SOLUTION INTRAMUSCULAR; INTRAVENOUS; SUBCUTANEOUS at 06:03

## 2024-03-30 RX ADMIN — AMIODARONE HYDROCHLORIDE 400 MG: 200 TABLET ORAL at 08:03

## 2024-03-30 RX ADMIN — APIXABAN 5 MG: 5 TABLET, FILM COATED ORAL at 08:03

## 2024-03-30 RX ADMIN — Medication 10 ML: at 05:03

## 2024-03-30 RX ADMIN — ACETAMINOPHEN 650 MG: 325 TABLET ORAL at 04:03

## 2024-03-30 RX ADMIN — AMIODARONE HYDROCHLORIDE 400 MG: 200 TABLET ORAL at 09:03

## 2024-03-30 RX ADMIN — HYDROMORPHONE HYDROCHLORIDE 0.5 MG: 1 INJECTION, SOLUTION INTRAMUSCULAR; INTRAVENOUS; SUBCUTANEOUS at 10:03

## 2024-03-30 RX ADMIN — FINASTERIDE 5 MG: 5 TABLET, FILM COATED ORAL at 09:03

## 2024-03-30 RX ADMIN — HYDROMORPHONE HYDROCHLORIDE 0.5 MG: 1 INJECTION, SOLUTION INTRAMUSCULAR; INTRAVENOUS; SUBCUTANEOUS at 03:03

## 2024-03-30 NOTE — ASSESSMENT & PLAN NOTE
Spiked fever on 3/27 despite being on antibiotics for 5 days for right ankle cellulitis. ID consulted. CT ankle with no obvious abscess. Possibly right arm from infiltration. Blood cultures negative to date. U/s of right forearm with no hematoma/abscess - no erythema/warmth.  - On meropenem/vancomycin  - ID following  - unclear etiology at this time     97.9

## 2024-03-30 NOTE — ASSESSMENT & PLAN NOTE
Patient has Paroxysmal (<7 days) atrial fibrillation which is uncontrolled. Patient is currently in atrial fibrillation.  - new onset Afib  - on labetalol for BP at home, eliquis for chronic DVT but did miss some doses of eliquis  - in ED given diltiazem gtt but that caused hypotension  - EKG with Afib RVR with normal Qtc  - started amiodarone with conversion to NSR. Changed to PO amiodarone  - however, he had recurrent Afib and was placed back on amio gtt.   - Cardiology consulted  - plan for SONY/DCCV on 3/22/24 but converted to sinus rhythm. Has been in/out of afib but rate controlled  - continue home eliquis    Lab Results   Component Value Date    TSH 2.672 03/20/2024     - TTE normal EF, mild LAE    [id number] - VASc score:  Congestive Heart Failure or EF < 35% NO   Hypertension YES  +1   Age >= 75 NO   Diabetes Mellitus YES  +1   Stroke/TIA prior history YES  +2   Vascular disease (PAD, MI or Aortic Plaque)? YES  +1   Age 65 - 74 YES  +1   Female NO   Total 6   Sinus at this time.

## 2024-03-30 NOTE — PLAN OF CARE
Problem: Adult Inpatient Plan of Care  Goal: Plan of Care Review  Outcome: Ongoing, Progressing  Goal: Patient-Specific Goal (Individualized)  Outcome: Ongoing, Progressing  Goal: Absence of Hospital-Acquired Illness or Injury  Outcome: Ongoing, Progressing  Goal: Optimal Comfort and Wellbeing  Outcome: Ongoing, Progressing  Goal: Readiness for Transition of Care  Outcome: Ongoing, Progressing     Problem: Infection  Goal: Absence of Infection Signs and Symptoms  Outcome: Ongoing, Progressing     Problem: Diabetes Comorbidity  Goal: Blood Glucose Level Within Targeted Range  Outcome: Ongoing, Progressing     Problem: Device-Related Complication Risk (Hemodialysis)  Goal: Safe, Effective Therapy Delivery  Outcome: Ongoing, Progressing     Problem: Infection (Hemodialysis)  Goal: Absence of Infection Signs and Symptoms  Outcome: Ongoing, Progressing     Problem: Impaired Wound Healing  Goal: Optimal Wound Healing  Outcome: Ongoing, Progressing     Problem: Fall Injury Risk  Goal: Absence of Fall and Fall-Related Injury  Outcome: Ongoing, Progressing

## 2024-03-30 NOTE — NURSING
[facility] MEWS TRIGGER FOLLOW UP       MEWS Monitoring, Score is: 4  Indication for review: Temp 102.9    Bedside Nurse, Sintia contacted, pt given acetaminophen. No other concerns at this time. Please call [phone] with any questions or concerns.

## 2024-03-30 NOTE — SUBJECTIVE & OBJECTIVE
Interval History: Fever overnight, pt reports feeling better today. Prior RUE PIV removed. US without abscess. Has not had a BM today, though had diarrhea yesterday.     Review of Systems   Constitutional:  Positive for fever. Negative for chills.   Gastrointestinal:  Positive for diarrhea.   All other systems reviewed and are negative.    Objective:     Vital Signs (Most Recent):  Temp: 99.1 °F (37.3 °C) (03/30/24 0738)  Pulse: 85 (03/30/24 0738)  Resp: 18 (03/30/24 0738)  BP: 129/60 (03/30/24 0738)  SpO2: (!) 93 % (03/30/24 0738) Vital Signs (24h Range):  Temp:  [97 °F (36.1 °C)-102.9 °F (39.4 °C)] 99.1 °F (37.3 °C)  Pulse:  [81-95] 85  Resp:  [16-20] 18  SpO2:  [92 %-100 %] 93 %  BP: (111-130)/(54-69) 129/60     Weight: 83.5 kg (184 lb 1.4 oz)  Body mass index is 27.99 kg/m².    Estimated Creatinine Clearance: 8.8 mL/min (A) (based on SCr of 8.3 mg/dL (H)).     Physical Exam  Constitutional:       General: He is not in acute distress.     Appearance: He is not ill-appearing or toxic-appearing.   Pulmonary:      Effort: Pulmonary effort is normal. No respiratory distress.   Abdominal:      General: There is no distension.      Palpations: Abdomen is soft.      Tenderness: There is no abdominal tenderness. There is no guarding.   Musculoskeletal:         General: Swelling (RUE - softer today) present.   Skin:     General: Skin is warm and dry.      Comments: RLE - minimal swelling, nontender to palpation   Neurological:      Mental Status: He is alert.          Significant Labs:   Microbiology Results (last 7 days)       Procedure Component Value Units Date/Time    Blood culture [[id number]] Collected: 03/27/24 1727    Order Status: Completed Specimen: Blood from Peripheral, Hand, Right Updated: 03/29/24 1903     Blood Culture, Routine No Growth to date      No Growth to date      No Growth to date    Blood culture [[id number]] Collected: 03/27/24 1719    Order Status: Completed Specimen: Blood from Peripheral,  Wrist, Right Updated: 03/29/24 1103     Blood Culture, Routine No Growth to date      No Growth to date    Blood culture [[id number]] Collected: 03/23/24 1614    Order Status: Completed Specimen: Blood from Peripheral, Hand, Left Updated: 03/27/24 1703     Blood Culture, Routine No Growth after 4 days.    Blood culture [[id number]] Collected: 03/23/24 1615    Order Status: Completed Specimen: Blood Updated: 03/27/24 1703     Blood Culture, Routine No Growth after 4 days.    Blood Culture #2 **CANNOT BE ORDERED STAT** [[id number]] Collected: 03/20/24 1450    Order Status: Completed Specimen: Blood from Peripheral, Hand, Right Updated: 03/24/24 1703     Blood Culture, Routine No Growth after 4 days.    Blood Culture #1 **CANNOT BE ORDERED STAT** [[id number]] Collected: 03/20/24 1449    Order Status: Completed Specimen: Blood from Peripheral, Hand, Right Updated: 03/24/24 1703     Blood Culture, Routine No Growth after 4 days.            Significant Imaging: I have reviewed all pertinent imaging results/findings within the past 24 hours.

## 2024-03-30 NOTE — PROGRESS NOTES
68 y o m with hx of esrd htn anemia 2nd hyperpth     Denies CNS ENT CP GI  OR DERM SX  Past Medical History:   Diagnosis Date    Allergy     Clotting disorder     Elaquis and APlavix    Deep vein thrombosis     Diabetes mellitus, type 2     Hypertension     Nuclear sclerosis of both eyes 6/9/2017    Renal disorder     Seizures     Stroke     Urinary tract infection      Past Surgical History:   Procedure Laterality Date    CATARACT EXTRACTION W/  INTRAOCULAR LENS IMPLANT Right 10/05/2017    Dr. Tay    CATARACT EXTRACTION W/  INTRAOCULAR LENS IMPLANT Left 10/19/2017    Dr. Tay    CYSTOSCOPY W/ RETROGRADES Bilateral 2/18/2021    Procedure: CYSTOSCOPY, WITH RETROGRADE PYELOGRAM;  Surgeon: JEMMA Styles MD;  Location: [facility] OR;  Service: Urology;  Laterality: Bilateral;  REQUESTING EARLY AS POSSIBE-LO / 2/8/2021 @ 1:13PM  RN Pre Op 2-11-21, Covid NEGATIVE ON  2-17-21.  C A    ESOPHAGOGASTRODUODENOSCOPY N/A 8/17/2020    Procedure: EGD (ESOPHAGOGASTRODUODENOSCOPY);  Surgeon: Desmond Chapman MD;  Location: [facility] ENDO;  Service: Endoscopy;  Laterality: N/A;    EYE SURGERY Bilateral     cataract    FEMORAL ARTERY STENT      FRACTURE SURGERY      KNEE SURGERY      bilateral scope     Social History     Socioeconomic History    Marital status: Single   Occupational History    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, etc   Tobacco Use    Smoking status: Never    Smokeless tobacco: Never   Substance and Sexual Activity    Alcohol use: No    Drug use: No   Social History Narrative    Lives with daughter     Family History   Problem Relation Age of Onset    Diabetes Mother     Heart disease Mother     Hypertension Mother     Cataracts Mother     No Known Problems Father     Hypertension Sister     No Known Problems Brother     No Known Problems Maternal Aunt     No Known Problems Maternal Uncle     No Known Problems Paternal Aunt     No Known Problems Paternal Uncle     No Known Problems  Maternal Grandmother     No Known Problems Maternal Grandfather     No Known Problems Paternal Grandmother     No Known Problems Paternal Grandfather     No Known Problems Daughter     No Known Problems Other     Amblyopia Neg Hx     Blindness Neg Hx     Cancer Neg Hx     Glaucoma Neg Hx     Macular degeneration Neg Hx     Retinal detachment Neg Hx     Strabismus Neg Hx     Stroke Neg Hx     Thyroid disease Neg Hx        Review of patient's allergies indicates:   Allergen Reactions    Ace inhibitors      Hyperkalemia 8/2018  Other reaction(s): Unknown    Penicillins Hives    Tizanidine      Felt hot       Current Facility-Administered Medications   Medication    acetaminophen tablet 650 mg    amiodarone tablet 400 mg    apixaban tablet 5 mg    atorvastatin tablet 80 mg    dextrose 10% bolus 125 mL 125 mL    dextrose 10% bolus 250 mL 250 mL    diphenoxylate-atropine 2.5-0.025 mg per tablet 1 tablet    epoetin marisol-epbx injection 5,000 Units    finasteride tablet 5 mg    glucagon (human recombinant) injection 1 mg    glucose chewable tablet 16 g    glucose chewable tablet 24 g    HYDROmorphone injection 0.5 mg    insulin aspart U-100 pen 0-5 Units    melatonin tablet 6 mg    midodrine tablet 10 mg    naloxone 0.4 mg/mL injection 0.02 mg    ondansetron injection 4 mg    prochlorperazine injection Soln 5 mg    sodium chloride 0.9% bolus 250 mL 250 mL    tamsulosin 24 hr capsule 0.8 mg    vitamin renal formula (B-complex-vitamin c-folic acid) 1 mg per capsule 1 capsule       LABS    Recent Results (from the past 24 hour(s))   POCT glucose    Collection Time: 03/29/24  4:33 PM   Result Value Ref Range    POCT Glucose 213 (H) 70 - 110 mg/dL   POCT glucose    Collection Time: 03/29/24  7:46 PM   Result Value Ref Range    POCT Glucose 180 (H) 70 - 110 mg/dL   POCT glucose    Collection Time: 03/30/24  7:35 AM   Result Value Ref Range    POCT Glucose 152 (H) 70 - 110 mg/dL   POCT glucose    Collection Time: 03/30/24 11:48 AM    Result Value Ref Range    POCT Glucose 198 (H) 70 - 110 mg/dL   ]    I/O last 3 completed shifts:  In: 460.6 [P.O.:360; IV Piggyback:100.6]  Out: 0     Vitals:    03/30/24 0738 03/30/24 1124 03/30/24 1146 03/30/24 1450   BP: 129/60  123/61    Pulse: 85 83 92 86   Resp: 18  18    Temp: 99.1 °F (37.3 °C)  99.5 °F (37.5 °C)    TempSrc: Oral  Oral    SpO2: (!) 93%  (!) 91%    Weight:       Height:           No Jvd, Thyromegaly or Lymphadenopathy  Lungs: Fairly clear anteriorly and laterally  Cor: RRR no G or rubs  Abd: Soft benign good bowel sounds non tender  Ext: no edema     A)    ESRD MWF   Htn  Anemia  2nd hypth po4 3.5 no need for binders right now  Dm  Hypoalb   Hx of high PSA     P)    HD MWF  Renal Diet  Home meds  Protect access  EPO prn (iron prn )  Binders prn   Adjust all meds to the degree of renal fx  Close follow up I/O and weights  Maintain Hydration   Antibiotx as needed

## 2024-03-30 NOTE — NURSING
[facility], [facility]  Nurses Note -- 4 Eyes      3/30/2024       Skin assessed on: Q Shift      [x] No Pressure Injuries Present    [x]Prevention Measures Documented    [] Yes LDA  for Pressure Injury Previously documented     [] Yes New Pressure Injury Discovered   [] LDA for New Pressure Injury Added      Attending RN:  Leah Donnelly, RN     Second RN:  Abbie Ribeiro, PCT

## 2024-03-30 NOTE — CLINICAL REVIEW
Sepsis Screen (most recent)       Sepsis Screen (IP) - 03/30/24 3338       Is the patient's history or complaint suggestive of a possible infection? Yes  -    Are there at least two of the following signs and symptoms present? Yes  -    Sepsis signs/symptoms - Hyper or Hypothermia Hyperthermia >100.4 or Hypothermia < 96.8  -    Sepsis signs/symptoms - WBC WBC < 4,000 or WBC > 12,000  -    Are any of the following organ dysfunction criteria present and not considered to be due to a chronic condition? Yes   ESRD-HD -    Organ Dysfunction Criteria - O2 O2 Saturation < 95% on room air  -    Initiate Sepsis Protocol No  -    Reason sepsis not considered Pt. receiving appropriate management  -              User Key  (r) = Recorded By, (t) = Taken By, (c) = Cosigned By      Initials Name    Gita Peng RN

## 2024-03-30 NOTE — PROGRESS NOTES
[facility] Medicine  Progress Note    Patient Name: Miguel Moore  MRN: [id number]  Patient Class: IP- Inpatient   Admission Date: 3/20/2024  Length of Stay: 10 days  Attending Physician: Bonifacio Reyes MD  Primary Care Provider: Raciel Raymond MD        Subjective:     Principal Problem:Fever        HPI:  Mr Miguel Moore is a 68 y.o. man with ESRD on HD, HTN, DM with neuropathy, history of CVA with residual R sided weakness, chronic DVT on eliquis who presented with feeling poorly. He attended his usual dialysis session on Monday 3/18 without issues. He developed fatigue and R ankle swelling. He has some pain with palpation but is able to walk. No reports of trauma. No wounds. No falls. No history of gout. Today he was feeling worse so did not go to dialysis and came to the ED. No fevers. No shortness of breath. Normal appetite. No nausea, vomiting. No chest pain. No palpitations.     In the ED, afebrile with HR initially controlled then to 130s Afib RVR. Started diltiazem but became hypotensive. Diltiazem stopped. Given antibiotics. Admitted for further management.     Overview/Hospital Course:  Mr Miguel Moore is a 68 y.o. man with ESRD on HD, DM who was admitted with new onset Afib RVR, hyperkalemia from missed HD session, and R ankle cellulitis. Started amio gtt with conversion to NSR. TTE EF 55-60%, mild LAE. Cardiology consulted. Now on PO amiodarone and continues home eliquis (on this for chronic DVT). He received HD with resolution of hyperkalemia. He is on CTX for his R ankle cellulitis and was given colchicine in case gout could contribute. Also added vanc,foot swelling is improving.This is improving. He developed recurrent Afib and amio gtt resumed. Cardiology was planning SONY/DCCV on 3/22. But converted to sinus. Transferred to floor. Right ankle cellulitis appears to be improving. Started spiking fevers on 3/27 up to 102.7 F. Blood cultures, u/a ordered. Vanc/Ceftriaxone  changed to Meropenem. CT right ankle with no obvious acute infection, pain is improving. Right forearm with swelling, u/s negative for abscess or hematoma. Blood cultures negative. ID following. Unclear etiology of fevers.       Interval History: still fevers overnight, cultures negative. His right ankle is not hurting today. His right arm is not painful either. Somewhat swollen but no hard/indurated or erythematous. He has no cough, shortness of breath. No abdominal pain. He reports having loose stool though.    Review of Systems  Objective:     Vital Signs (Most Recent):  Temp: 99.1 °F (37.3 °C) (03/30/24 0738)  Pulse: 85 (03/30/24 0738)  Resp: 18 (03/30/24 0738)  BP: 129/60 (03/30/24 0738)  SpO2: (!) 93 % (03/30/24 0738) Vital Signs (24h Range):  Temp:  [97 °F (36.1 °C)-102.9 °F (39.4 °C)] 99.1 °F (37.3 °C)  Pulse:  [80-95] 85  Resp:  [16-20] 18  SpO2:  [92 %-100 %] 93 %  BP: (111-136)/(54-69) 129/60     Weight: 83.5 kg (184 lb 1.4 oz)  Body mass index is 27.99 kg/m².    Intake/Output Summary (Last 24 hours) at 3/30/2024 0834  Last data filed at 3/30/2024 0727  Gross per 24 hour   Intake 459.38 ml   Output 0 ml   Net 459.38 ml           Physical Exam  Vitals and nursing note reviewed.   Constitutional:       General: He is not in acute distress.     Appearance: He is ill-appearing.   Cardiovascular:      Rate and Rhythm: Normal rate.      Pulses: Normal pulses.   Pulmonary:      Effort: Pulmonary effort is normal. No respiratory distress.      Breath sounds: No wheezing.   Abdominal:      General: Bowel sounds are normal.   Musculoskeletal:         General: Swelling present.      Comments: Lower extremities with swelling significantly improved. Right arm with some swelling but no induration, erythema or increased warmth.    Skin:     General: Skin is warm and dry.   Neurological:      Mental Status: He is alert. Mental status is at baseline.   Psychiatric:         Mood and Affect: Mood normal.         Thought  Content: Thought content normal.             Significant Labs: All pertinent labs within the past 24 hours have been reviewed.    Significant Imaging: I have reviewed all pertinent imaging results/findings within the past 24 hours.    Assessment/Plan:      * Fever  Spiked fever on 3/27 despite being on antibiotics for 5 days for right ankle cellulitis. ID consulted. CT ankle with no obvious abscess. Possibly right arm from infiltration. Blood cultures negative to date. U/s of right forearm with no hematoma/abscess - no erythema/warmth.  - On meropenem/vancomycin  - ID following  - unclear etiology at this time      Cellulitis of right foot  R ankle pain, swelling, warmth present. Potential gout vs cellulitis. 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 No fracture present. Uric acid normal. Arterial US and venous US with no clots, appropriate flow  - on ceftriaxone/vanc for 7 days and still with pain and now with increased WBC and fevers  - blood cultures currently NGTD  - CT right ankle with no acute findings  - I do not believe this is source of recurrent fevers at this time      Paroxysmal atrial fibrillation with RVR  Patient has Paroxysmal (<7 days) atrial fibrillation which is uncontrolled. Patient is currently in atrial fibrillation.  - new onset Afib  - on labetalol for BP at home, eliquis for chronic DVT but did miss some doses of eliquis  - in ED given diltiazem gtt but that caused hypotension  - EKG with Afib RVR with normal Qtc  - started amiodarone with conversion to NSR. Changed to PO amiodarone  - however, he had recurrent Afib and was placed back on amio gtt.   - Cardiology consulted  - plan for OSNY/DCCV on 3/22/24 but converted to sinus rhythm. Has been in/out of afib but rate controlled  - continue home eliquis    Lab Results   Component Value Date    TSH 2.672 03/20/2024     - TTE normal EF, mild LAE    [id number] - VASc score:  Congestive Heart Failure or EF < 35% NO    Hypertension YES  +1   Age >= 75 NO   Diabetes Mellitus YES  +1   Stroke/TIA prior history YES  +2   Vascular disease (PAD, MI or Aortic Plaque)? YES  +1   Age 65 - 74 YES  +1   Female NO   Total 6   Sinus at this time.      Anemia in chronic kidney disease  Patient's anemia is currently controlled. Has not received any PRBCs to date. Etiology likely d/t chronic disease due to ESRD  Current CBC reviewed-   Lab Results   Component Value Date    HGB 8.9 (L) 03/29/2024    HCT 24.7 (L) 03/29/2024     Monitor serial CBC and transfuse if patient becomes hemodynamically unstable, symptomatic or H/H drops below 7/21.    Chronic deep vein thrombosis (DVT) of popliteal vein of right lower extremity 9/21/20 outside US; unprovoked; anticoagulation        History of stroke  pT,OT.      Secondary hyperparathyroidism of renal origin  Continue renvela       ESRD (end stage renal disease)  ESRD via LUE AVF. Last session 3/18/24. Missed 3/20/24 due to fatigue. Usually MWF  - hyperkalemic on admit. Does not appear volume overloaded.   - Nephrology Dr Currie consulted. Received HD on 3/20. K normalized.   - continue MWF HD    Hemiplegia and hemiparesis following cerebral infarction affecting right dominant side  No signs of acute stroke. Does have RUE weakness.   - continue home asa, eliquis, statin      Seizure disorder as sequela of cerebrovascular accident  Not currently on antiepileptics. No seizure activity reported. Monitor.       Controlled type 2 diabetes mellitus with chronic kidney disease on chronic dialysis, with long-term current use of insulin  A1c:   Lab Results   Component Value Date    HGBA1C 5.8 (H) 03/20/2024     Meds: SSI PRN to maintain goal 140-180  ADA renal diet, accuchecks, hypoglycemic protocol      Dyslipidemia  Continue statin      Benign essential HTN  Chronic. Latest blood pressure and vitals reviewed-     Temp:  [97 °F (36.1 °C)-102.9 °F (39.4 °C)]   Pulse:  [80-95]   Resp:  [16-20]   BP:  (111-136)/(54-69)   SpO2:  [92 %-100 %] .   Home meds for hypertension were reviewed and noted below.   Hypertension Medications               amLODIPine (NORVASC) 10 MG tablet Take 1 tablet by mouth once daily    labetaloL (NORMODYNE) 100 MG tablet Take 1 tablet (100 mg total) by mouth once daily.          - BP Rx held on admit due to hypotension  - BP improved. Resumed home Rx    Will utilize p.r.n. blood pressure medication only if patient's blood pressure greater than 180/110 and he develops symptoms such as worsening chest pain or shortness of breath.      VTE Risk Mitigation (From admission, onward)           Ordered     apixaban tablet 5 mg  2 times daily         03/20/24 1708                    Discharge Planning   GARY:      Code Status: Full Code   Is the patient medically ready for discharge?:     Reason for patient still in hospital (select all that apply): Treatment  Discharge Plan A: Skilled Nursing Facility   Discharge Delays: None known at this time              Bonifacio Reyes MD  Department of Hospital Medicine   [facility] [place] - Med Surg

## 2024-03-30 NOTE — ASSESSMENT & PLAN NOTE
Patient's anemia is currently controlled. Has not received any PRBCs to date. Etiology likely d/t chronic disease due to ESRD  Current CBC reviewed-   Lab Results   Component Value Date    HGB 8.9 (L) 03/29/2024    HCT 24.7 (L) 03/29/2024     Monitor serial CBC and transfuse if patient becomes hemodynamically unstable, symptomatic or H/H drops below 7/21.

## 2024-03-30 NOTE — ASSESSMENT & PLAN NOTE
Chronic. Latest blood pressure and vitals reviewed-     Temp:  [97 °F (36.1 °C)-102.9 °F (39.4 °C)]   Pulse:  [80-95]   Resp:  [16-20]   BP: (111-136)/(54-69)   SpO2:  [92 %-100 %] .   Home meds for hypertension were reviewed and noted below.   Hypertension Medications               amLODIPine (NORVASC) 10 MG tablet Take 1 tablet by mouth once daily    labetaloL (NORMODYNE) 100 MG tablet Take 1 tablet (100 mg total) by mouth once daily.          - BP Rx held on admit due to hypotension  - BP improved. Resumed home Rx    Will utilize p.r.n. blood pressure medication only if patient's blood pressure greater than 180/110 and he develops symptoms such as worsening chest pain or shortness of breath.

## 2024-03-30 NOTE — SUBJECTIVE & OBJECTIVE
Interval History: still fevers overnight, cultures negative. His right ankle is not hurting today. His right arm is not painful either. Somewhat swollen but no hard/indurated or erythematous. He has no cough, shortness of breath. No abdominal pain. He reports having loose stool though.    Review of Systems  Objective:     Vital Signs (Most Recent):  Temp: 99.1 °F (37.3 °C) (03/30/24 0738)  Pulse: 85 (03/30/24 0738)  Resp: 18 (03/30/24 0738)  BP: 129/60 (03/30/24 0738)  SpO2: (!) 93 % (03/30/24 0738) Vital Signs (24h Range):  Temp:  [97 °F (36.1 °C)-102.9 °F (39.4 °C)] 99.1 °F (37.3 °C)  Pulse:  [80-95] 85  Resp:  [16-20] 18  SpO2:  [92 %-100 %] 93 %  BP: (111-136)/(54-69) 129/60     Weight: 83.5 kg (184 lb 1.4 oz)  Body mass index is 27.99 kg/m².    Intake/Output Summary (Last 24 hours) at 3/30/2024 0834  Last data filed at 3/30/2024 0727  Gross per 24 hour   Intake 459.38 ml   Output 0 ml   Net 459.38 ml           Physical Exam  Vitals and nursing note reviewed.   Constitutional:       General: He is not in acute distress.     Appearance: He is ill-appearing.   Cardiovascular:      Rate and Rhythm: Normal rate.      Pulses: Normal pulses.   Pulmonary:      Effort: Pulmonary effort is normal. No respiratory distress.      Breath sounds: No wheezing.   Abdominal:      General: Bowel sounds are normal.   Musculoskeletal:         General: Swelling present.      Comments: Lower extremities with swelling significantly improved. Right arm with some swelling but no induration, erythema or increased warmth.    Skin:     General: Skin is warm and dry.   Neurological:      Mental Status: He is alert. Mental status is at baseline.   Psychiatric:         Mood and Affect: Mood normal.         Thought Content: Thought content normal.             Significant Labs: All pertinent labs within the past 24 hours have been reviewed.    Significant Imaging: I have reviewed all pertinent imaging results/findings within the past 24  hours.

## 2024-03-30 NOTE — PROGRESS NOTES
Therapy with vancomycin complete and/or consult discontinued by provider.  Pharmacy will sign off, please re-consult as needed.  Thank You

## 2024-03-30 NOTE — ASSESSMENT & PLAN NOTE
I independently reviewed patient's lab work and images as documented. 67 yo male with ESRD on HD, DM and CVA with R sided weakness admitted for fatigue, hyperkalemia, found to have Afib with RVR. Admission course notable for R ankle cellulitis, CT with extensive subcutaneous edema, no focal fluid collection or OM seen. He spiked episodic fevers- blood cx obtained ngtd. TTE on 3/20 without IE. Suspect fevers to be due to infiltrated RUE line vs thrombophlebitis vs cdiff vs drug fever. Mild swelling around ankle, pt reported interval improvement in pain and swelling since admission - no ongoing cellulitis.     Recommendations  -remove old PIV (R EJ present)  -stop abx and monitor off  -consider noninfectious work up (dopplers bilaterally to evaluate for DVT)  -monitor stool output, if >3watery diarrhea (not on laxatives), consider Cdiff testing as pt has been on broad spectrum abx therapy  -elevate RUE, swelling improved

## 2024-03-30 NOTE — PROGRESS NOTES
[facility] Surg  Infectious Disease  Progress Note    Patient Name: Miguel Moore  MRN: [id number]  Admission Date: 3/20/2024  Length of Stay: 10 days  Attending Physician: Bonifacio Reyes MD  Primary Care Provider: Raciel Raymond MD    Isolation Status: No active isolations  Assessment/Plan:      Other  * Fever  I independently reviewed patient's lab work and images as documented. 69 yo male with ESRD on HD, DM and CVA with R sided weakness admitted for fatigue, hyperkalemia, found to have Afib with RVR. Admission course notable for R ankle cellulitis, CT with extensive subcutaneous edema, no focal fluid collection or OM seen. He spiked episodic fevers- blood cx obtained ngtd. TTE on 3/20 without IE. Suspect fevers to be due to infiltrated RUE line vs thrombophlebitis vs cdiff vs drug fever. Mild swelling around ankle, pt reported interval improvement in pain and swelling since admission - no ongoing cellulitis.     Recommendations  -remove old PIV (R EJ present)  -stop abx and monitor off  -consider noninfectious work up (dopplers bilaterally to evaluate for DVT)  -monitor stool output, if >3watery diarrhea (not on laxatives), consider Cdiff testing as pt has been on broad spectrum abx therapy  -elevate RUE, swelling improved          Thank you for your consult. I will follow-up with patient. Please contact us if you have any additional questions. Above d/w primary team.       Laney Underwood MD  Infectious Disease  [facility] - Med Surg    Subjective:     Principal Problem:Fever    HPI: 69 yo male with ESRD on HD, DM and CVA with R sided weakness admitted for fatigue, hyperkalemia, found to have Afib with RVR. ID consulted for fevers. Admission course notable for R ankle cellulitis, CT with extensive subcutaneous edema, no focal fluid collection or OM seen. He spiked a fever on 3/27 and 3/28 - blood cx obtained ngtd. TTE on 3/20 without IE. Pt is currently on vancomycin and meropenem. Prior to  admission, pt reported he had pain in R ankle, denied fevers chills, sick contacts. He states that since he's been here, he has had some loose stools and RUE swelling. Reported hives with PCN in ithe past, tolerated carb/cephs.   Interval History: Fever overnight, pt reports feeling better today. Prior RUE PIV removed. US without abscess. Has not had a BM today, though had diarrhea yesterday.     Review of Systems   Constitutional:  Positive for fever. Negative for chills.   Gastrointestinal:  Positive for diarrhea.   All other systems reviewed and are negative.    Objective:     Vital Signs (Most Recent):  Temp: 99.1 °F (37.3 °C) (03/30/24 0738)  Pulse: 85 (03/30/24 0738)  Resp: 18 (03/30/24 0738)  BP: 129/60 (03/30/24 0738)  SpO2: (!) 93 % (03/30/24 0738) Vital Signs (24h Range):  Temp:  [97 °F (36.1 °C)-102.9 °F (39.4 °C)] 99.1 °F (37.3 °C)  Pulse:  [81-95] 85  Resp:  [16-20] 18  SpO2:  [92 %-100 %] 93 %  BP: (111-130)/(54-69) 129/60     Weight: 83.5 kg (184 lb 1.4 oz)  Body mass index is 27.99 kg/m².    Estimated Creatinine Clearance: 8.8 mL/min (A) (based on SCr of 8.3 mg/dL (H)).     Physical Exam  Constitutional:       General: He is not in acute distress.     Appearance: He is not ill-appearing or toxic-appearing.   Pulmonary:      Effort: Pulmonary effort is normal. No respiratory distress.   Abdominal:      General: There is no distension.      Palpations: Abdomen is soft.      Tenderness: There is no abdominal tenderness. There is no guarding.   Musculoskeletal:         General: Swelling (RUE - softer today) present.   Skin:     General: Skin is warm and dry.      Comments: RLE - minimal swelling, nontender to palpation   Neurological:      Mental Status: He is alert.          Significant Labs:   Microbiology Results (last 7 days)       Procedure Component Value Units Date/Time    Blood culture [[id number]] Collected: 03/27/24 3822    Order Status: Completed Specimen: Blood from Peripheral, Hand, Right  Updated: 03/29/24 1903     Blood Culture, Routine No Growth to date      No Growth to date      No Growth to date    Blood culture [[id number]] Collected: 03/27/24 1719    Order Status: Completed Specimen: Blood from Peripheral, Wrist, Right Updated: 03/29/24 1103     Blood Culture, Routine No Growth to date      No Growth to date    Blood culture [[id number]] Collected: 03/23/24 1614    Order Status: Completed Specimen: Blood from Peripheral, Hand, Left Updated: 03/27/24 1703     Blood Culture, Routine No Growth after 4 days.    Blood culture [[id number]] Collected: 03/23/24 1615    Order Status: Completed Specimen: Blood Updated: 03/27/24 1703     Blood Culture, Routine No Growth after 4 days.    Blood Culture #2 **CANNOT BE ORDERED STAT** [[id number]] Collected: 03/20/24 1450    Order Status: Completed Specimen: Blood from Peripheral, Hand, Right Updated: 03/24/24 1703     Blood Culture, Routine No Growth after 4 days.    Blood Culture #1 **CANNOT BE ORDERED STAT** [[id number]] Collected: 03/20/24 1449    Order Status: Completed Specimen: Blood from Peripheral, Hand, Right Updated: 03/24/24 1703     Blood Culture, Routine No Growth after 4 days.            Significant Imaging: I have reviewed all pertinent imaging results/findings within the past 24 hours.

## 2024-03-30 NOTE — ASSESSMENT & PLAN NOTE
R ankle pain, swelling, warmth present. Potential gout vs cellulitis. 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 No fracture present. Uric acid normal. Arterial US and venous US with no clots, appropriate flow  - on ceftriaxone/vanc for 7 days and still with pain and now with increased WBC and fevers  - blood cultures currently NGTD  - CT right ankle with no acute findings  - I do not believe this is source of recurrent fevers at this time

## 2024-03-30 NOTE — NURSING
[facility] [place]  Nurses Note -- 4 Eyes      3/29/2024       Skin assessed on: Q Shift      [x] No Pressure Injuries Present    []Prevention Measures Documented    [] Yes LDA  for Pressure Injury Previously documented     [] Yes New Pressure Injury Discovered   [] LDA for New Pressure Injury Added      Attending RN:  Sintia Levy RN     Second RN:  Tamara Dominguez LPN

## 2024-03-30 NOTE — PLAN OF CARE
Problem: Adult Inpatient Plan of Care  Goal: Plan of Care Review  Outcome: Ongoing, Progressing  Flowsheets (Taken 3/30/2024 0638)  Plan of Care Reviewed With: patient     Problem: Adult Inpatient Plan of Care  Goal: Absence of Hospital-Acquired Illness or Injury  Outcome: Ongoing, Progressing  Intervention: Identify and Manage Fall Risk  Flowsheets (Taken 3/30/2024 0638)  Safety Promotion/Fall Prevention:   assistive device/personal item within reach   bed alarm set   commode/urinal/bedpan at bedside   Fall Risk reviewed with patient/family   high risk medications identified   lighting adjusted   medications reviewed   nonskid shoes/socks when out of bed   room near unit station   side rails raised x 2   instructed to call staff for mobility  Intervention: Prevent Skin Injury  Flowsheets (Taken 3/30/2024 0638)  Body Position:   right   turned  Skin Protection:   adhesive use limited   transparent dressing maintained   tubing/devices free from skin contact  Intervention: Prevent Infection  Flowsheets (Taken 3/30/2024 0642)  Infection Prevention:   hand hygiene promoted   single patient room provided   rest/sleep promoted

## 2024-03-31 LAB
ALBUMIN SERPL BCP-MCNC: 1.5 G/DL (ref 3.5–5.2)
ALP SERPL-CCNC: 154 U/L (ref 55–135)
ALT SERPL W/O P-5'-P-CCNC: 29 U/L (ref 10–44)
ANION GAP SERPL CALC-SCNC: 13 MMOL/L (ref 8–16)
AST SERPL-CCNC: 41 U/L (ref 10–40)
BACTERIA BLD CULT: NORMAL
BASOPHILS # BLD AUTO: 0.06 K/UL (ref 0–0.2)
BASOPHILS NFR BLD: 0.4 % (ref 0–1.9)
BILIRUB SERPL-MCNC: 0.7 MG/DL (ref 0.1–1)
BUN SERPL-MCNC: 41 MG/DL (ref 8–23)
CALCIUM SERPL-MCNC: 7.4 MG/DL (ref 8.7–10.5)
CHLORIDE SERPL-SCNC: 101 MMOL/L (ref 95–110)
CO2 SERPL-SCNC: 18 MMOL/L (ref 23–29)
CREAT SERPL-MCNC: 8.4 MG/DL (ref 0.5–1.4)
DIFFERENTIAL METHOD BLD: ABNORMAL
EOSINOPHIL # BLD AUTO: 0.1 K/UL (ref 0–0.5)
EOSINOPHIL NFR BLD: 0.3 % (ref 0–8)
ERYTHROCYTE [DISTWIDTH] IN BLOOD BY AUTOMATED COUNT: 21.9 % (ref 11.5–14.5)
EST. GFR  (NO RACE VARIABLE): 6 ML/MIN/1.73 M^2
GLUCOSE SERPL-MCNC: 152 MG/DL (ref 70–110)
HCT VFR BLD AUTO: 24.8 % (ref 40–54)
HGB BLD-MCNC: 8.6 G/DL (ref 14–18)
IMM GRANULOCYTES # BLD AUTO: 0.25 K/UL (ref 0–0.04)
IMM GRANULOCYTES NFR BLD AUTO: 1.6 % (ref 0–0.5)
LYMPHOCYTES # BLD AUTO: 1.1 K/UL (ref 1–4.8)
LYMPHOCYTES NFR BLD: 6.6 % (ref 18–48)
MCH RBC QN AUTO: 24.6 PG (ref 27–31)
MCHC RBC AUTO-ENTMCNC: 34.7 G/DL (ref 32–36)
MCV RBC AUTO: 71 FL (ref 82–98)
MONOCYTES # BLD AUTO: 1.2 K/UL (ref 0.3–1)
MONOCYTES NFR BLD: 7.7 % (ref 4–15)
NEUTROPHILS # BLD AUTO: 13.5 K/UL (ref 1.8–7.7)
NEUTROPHILS NFR BLD: 83.4 % (ref 38–73)
NRBC BLD-RTO: 0 /100 WBC
PLATELET # BLD AUTO: 328 K/UL (ref 150–450)
PMV BLD AUTO: 10.2 FL (ref 9.2–12.9)
POCT GLUCOSE: 173 MG/DL (ref 70–110)
POCT GLUCOSE: 174 MG/DL (ref 70–110)
POCT GLUCOSE: 189 MG/DL (ref 70–110)
POCT GLUCOSE: 197 MG/DL (ref 70–110)
POTASSIUM SERPL-SCNC: 4.1 MMOL/L (ref 3.5–5.1)
PROT SERPL-MCNC: 4.5 G/DL (ref 6–8.4)
RBC # BLD AUTO: 3.5 M/UL (ref 4.6–6.2)
SODIUM SERPL-SCNC: 132 MMOL/L (ref 136–145)
WBC # BLD AUTO: 16.12 K/UL (ref 3.9–12.7)

## 2024-03-31 PROCEDURE — 80053 COMPREHEN METABOLIC PANEL: CPT | Mod: HCNC | Performed by: STUDENT IN AN ORGANIZED HEALTH CARE EDUCATION/TRAINING PROGRAM

## 2024-03-31 PROCEDURE — 36415 COLL VENOUS BLD VENIPUNCTURE: CPT | Mod: HCNC,XB | Performed by: STUDENT IN AN ORGANIZED HEALTH CARE EDUCATION/TRAINING PROGRAM

## 2024-03-31 PROCEDURE — 25000003 PHARM REV CODE 250: Mod: HCNC | Performed by: HOSPITALIST

## 2024-03-31 PROCEDURE — 87040 BLOOD CULTURE FOR BACTERIA: CPT | Mod: 59,HCNC | Performed by: STUDENT IN AN ORGANIZED HEALTH CARE EDUCATION/TRAINING PROGRAM

## 2024-03-31 PROCEDURE — 99233 SBSQ HOSP IP/OBS HIGH 50: CPT | Mod: HCNC,,, | Performed by: STUDENT IN AN ORGANIZED HEALTH CARE EDUCATION/TRAINING PROGRAM

## 2024-03-31 PROCEDURE — 63600175 PHARM REV CODE 636 W HCPCS: Mod: HCNC | Performed by: STUDENT IN AN ORGANIZED HEALTH CARE EDUCATION/TRAINING PROGRAM

## 2024-03-31 PROCEDURE — 85025 COMPLETE CBC W/AUTO DIFF WBC: CPT | Mod: HCNC | Performed by: STUDENT IN AN ORGANIZED HEALTH CARE EDUCATION/TRAINING PROGRAM

## 2024-03-31 PROCEDURE — 21400001 HC TELEMETRY ROOM: Mod: HCNC

## 2024-03-31 PROCEDURE — 27000207 HC ISOLATION: Mod: HCNC

## 2024-03-31 PROCEDURE — 36415 COLL VENOUS BLD VENIPUNCTURE: CPT | Mod: HCNC | Performed by: STUDENT IN AN ORGANIZED HEALTH CARE EDUCATION/TRAINING PROGRAM

## 2024-03-31 RX ADMIN — AMIODARONE HYDROCHLORIDE 400 MG: 200 TABLET ORAL at 08:03

## 2024-03-31 RX ADMIN — ACETAMINOPHEN 650 MG: 325 TABLET ORAL at 08:03

## 2024-03-31 RX ADMIN — HYDROMORPHONE HYDROCHLORIDE 0.5 MG: 1 INJECTION, SOLUTION INTRAMUSCULAR; INTRAVENOUS; SUBCUTANEOUS at 08:03

## 2024-03-31 RX ADMIN — APIXABAN 5 MG: 5 TABLET, FILM COATED ORAL at 08:03

## 2024-03-31 RX ADMIN — TAMSULOSIN HYDROCHLORIDE 0.8 MG: 0.4 CAPSULE ORAL at 08:03

## 2024-03-31 RX ADMIN — HYDROMORPHONE HYDROCHLORIDE 0.5 MG: 1 INJECTION, SOLUTION INTRAMUSCULAR; INTRAVENOUS; SUBCUTANEOUS at 06:03

## 2024-03-31 RX ADMIN — MIDODRINE HYDROCHLORIDE 10 MG: 5 TABLET ORAL at 08:03

## 2024-03-31 RX ADMIN — ATORVASTATIN CALCIUM 80 MG: 40 TABLET, FILM COATED ORAL at 08:03

## 2024-03-31 RX ADMIN — FINASTERIDE 5 MG: 5 TABLET, FILM COATED ORAL at 09:03

## 2024-03-31 RX ADMIN — MIDODRINE HYDROCHLORIDE 10 MG: 5 TABLET ORAL at 03:03

## 2024-03-31 RX ADMIN — HYDROMORPHONE HYDROCHLORIDE 0.5 MG: 1 INJECTION, SOLUTION INTRAMUSCULAR; INTRAVENOUS; SUBCUTANEOUS at 01:03

## 2024-03-31 RX ADMIN — NEPHROCAP 1 CAPSULE: 1 CAP ORAL at 08:03

## 2024-03-31 NOTE — NURSING
[facility], [facility] - [place]  Nurses Note -- 4 Eyes      3/31/2024       Skin assessed on: Q Shift      [x] No Pressure Injuries Present    [x]Prevention Measures Documented    [] Yes LDA  for Pressure Injury Previously documented     [] Yes New Pressure Injury Discovered   [] LDA for New Pressure Injury Added      Attending RN:  Sintia Levy RN     Second RN:  Leah Donnelly RN

## 2024-03-31 NOTE — PROGRESS NOTES
68 y o m with hx of esrd htn anemia 2nd hyperpth     Denies CNS ENT CP GI  OR DERM SX  Past Medical History:   Diagnosis Date    Allergy     Clotting disorder     Elaquis and APlavix    Deep vein thrombosis     Diabetes mellitus, type 2     Hypertension     Nuclear sclerosis of both eyes 6/9/2017    Renal disorder     Seizures     Stroke     Urinary tract infection      Past Surgical History:   Procedure Laterality Date    CATARACT EXTRACTION W/  INTRAOCULAR LENS IMPLANT Right 10/05/2017    Dr. Tay    CATARACT EXTRACTION W/  INTRAOCULAR LENS IMPLANT Left 10/19/2017    Dr. Tay    CYSTOSCOPY W/ RETROGRADES Bilateral 2/18/2021    Procedure: CYSTOSCOPY, WITH RETROGRADE PYELOGRAM;  Surgeon: JEMMA Styles MD;  Location: [facility] OR;  Service: Urology;  Laterality: Bilateral;  REQUESTING EARLY AS POSSIBE-LO / 2/8/2021 @ 1:13PM  RN Pre Op 2-11-21, Covid NEGATIVE ON  2-17-21.  C A    ESOPHAGOGASTRODUODENOSCOPY N/A 8/17/2020    Procedure: EGD (ESOPHAGOGASTRODUODENOSCOPY);  Surgeon: Desmond Chapman MD;  Location: [facility] ENDO;  Service: Endoscopy;  Laterality: N/A;    EYE SURGERY Bilateral     cataract    FEMORAL ARTERY STENT      FRACTURE SURGERY      KNEE SURGERY      bilateral scope     Social History     Socioeconomic History    Marital status: Single   Occupational History    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, etc   Tobacco Use    Smoking status: Never    Smokeless tobacco: Never   Substance and Sexual Activity    Alcohol use: No    Drug use: No   Social History Narrative    Lives with daughter     Family History   Problem Relation Age of Onset    Diabetes Mother     Heart disease Mother     Hypertension Mother     Cataracts Mother     No Known Problems Father     Hypertension Sister     No Known Problems Brother     No Known Problems Maternal Aunt     No Known Problems Maternal Uncle     No Known Problems Paternal Aunt     No Known Problems Paternal Uncle     No Known Problems  Maternal Grandmother     No Known Problems Maternal Grandfather     No Known Problems Paternal Grandmother     No Known Problems Paternal Grandfather     No Known Problems Daughter     No Known Problems Other     Amblyopia Neg Hx     Blindness Neg Hx     Cancer Neg Hx     Glaucoma Neg Hx     Macular degeneration Neg Hx     Retinal detachment Neg Hx     Strabismus Neg Hx     Stroke Neg Hx     Thyroid disease Neg Hx        Review of patient's allergies indicates:   Allergen Reactions    Ace inhibitors      Hyperkalemia 8/2018  Other reaction(s): Unknown    Penicillins Hives    Tizanidine      Felt hot       Current Facility-Administered Medications   Medication    acetaminophen tablet 650 mg    amiodarone tablet 400 mg    apixaban tablet 5 mg    atorvastatin tablet 80 mg    dextrose 10% bolus 125 mL 125 mL    dextrose 10% bolus 250 mL 250 mL    diphenoxylate-atropine 2.5-0.025 mg per tablet 1 tablet    epoetin marisol-epbx injection 5,000 Units    finasteride tablet 5 mg    glucagon (human recombinant) injection 1 mg    glucose chewable tablet 16 g    glucose chewable tablet 24 g    HYDROmorphone injection 0.5 mg    insulin aspart U-100 pen 0-5 Units    melatonin tablet 6 mg    midodrine tablet 10 mg    naloxone 0.4 mg/mL injection 0.02 mg    ondansetron injection 4 mg    prochlorperazine injection Soln 5 mg    sodium chloride 0.9% bolus 250 mL 250 mL    tamsulosin 24 hr capsule 0.8 mg    vitamin renal formula (B-complex-vitamin c-folic acid) 1 mg per capsule 1 capsule       LABS    Recent Results (from the past 24 hour(s))   POCT glucose    Collection Time: 03/30/24  4:06 PM   Result Value Ref Range    POCT Glucose 183 (H) 70 - 110 mg/dL   POCT glucose    Collection Time: 03/30/24  7:53 PM   Result Value Ref Range    POCT Glucose 180 (H) 70 - 110 mg/dL   CBC Auto Differential    Collection Time: 03/31/24  4:17 AM   Result Value Ref Range    WBC 16.12 (H) 3.90 - 12.70 K/uL    RBC 3.50 (L) 4.60 - 6.20 M/uL    Hemoglobin  8.6 (L) 14.0 - 18.0 g/dL    Hematocrit 24.8 (L) 40.0 - 54.0 %    MCV 71 (L) 82 - 98 fL    MCH 24.6 (L) 27.0 - 31.0 pg    MCHC 34.7 32.0 - 36.0 g/dL    RDW 21.9 (H) 11.5 - 14.5 %    Platelets 328 150 - 450 K/uL    MPV 10.2 9.2 - 12.9 fL    Immature Granulocytes 1.6 (H) 0.0 - 0.5 %    Gran # (ANC) 13.5 (H) 1.8 - 7.7 K/uL    Immature Grans (Abs) 0.25 (H) 0.00 - 0.04 K/uL    Lymph # 1.1 1.0 - 4.8 K/uL    Mono # 1.2 (H) 0.3 - 1.0 K/uL    Eos # 0.1 0.0 - 0.5 K/uL    Baso # 0.06 0.00 - 0.20 K/uL    nRBC 0 0 /100 WBC    Gran % 83.4 (H) 38.0 - 73.0 %    Lymph % 6.6 (L) 18.0 - 48.0 %    Mono % 7.7 4.0 - 15.0 %    Eosinophil % 0.3 0.0 - 8.0 %    Basophil % 0.4 0.0 - 1.9 %    Differential Method Automated    Comprehensive Metabolic Panel    Collection Time: 03/31/24  4:17 AM   Result Value Ref Range    Sodium 132 (L) 136 - 145 mmol/L    Potassium 4.1 3.5 - 5.1 mmol/L    Chloride 101 95 - 110 mmol/L    CO2 18 (L) 23 - 29 mmol/L    Glucose 152 (H) 70 - 110 mg/dL    BUN 41 (H) 8 - 23 mg/dL    Creatinine 8.4 (H) 0.5 - 1.4 mg/dL    Calcium 7.4 (L) 8.7 - 10.5 mg/dL    Total Protein 4.5 (L) 6.0 - 8.4 g/dL    Albumin 1.5 (L) 3.5 - 5.2 g/dL    Total Bilirubin 0.7 0.1 - 1.0 mg/dL    Alkaline Phosphatase 154 (H) 55 - 135 U/L    AST 41 (H) 10 - 40 U/L    ALT 29 10 - 44 U/L    eGFR 6 (A) >60 mL/min/1.73 m^2    Anion Gap 13 8 - 16 mmol/L   POCT glucose    Collection Time: 03/31/24  7:42 AM   Result Value Ref Range    POCT Glucose 173 (H) 70 - 110 mg/dL   POCT glucose    Collection Time: 03/31/24 10:58 AM   Result Value Ref Range    POCT Glucose 197 (H) 70 - 110 mg/dL   ]    I/O last 3 completed shifts:  In: 459.4 [P.O.:360; IV Piggyback:99.4]  Out: 0     Vitals:    03/31/24 1052 03/31/24 1057 03/31/24 1330 03/31/24 1459   BP:  120/60     Pulse: 87 83  83   Resp:  20 18    Temp:  99.2 °F (37.3 °C)     TempSrc:  Oral     SpO2:  (!) 92%     Weight:       Height:           No Jvd, Thyromegaly or Lymphadenopathy  Lungs: Fairly clear anteriorly  and laterally  Cor: RRR no G or rubs  Abd: Soft benign good bowel sounds non tender  Ext: no edema     A)    ESRD MWF   Htn  Anemia  2nd hypth po4 3.5 no need for binders right now  Dm  Hypoalb   Hx of high PSA     P)    HD MWF  Renal Diet  Home meds  Protect access  EPO prn (iron prn )  Binders prn   Adjust all meds to the degree of renal fx  Close follow up I/O and weights  Maintain Hydration   Antibiotx as needed

## 2024-03-31 NOTE — NURSING
Report received and care assumed. Discussed plan of care and safety with patient . Reviewed call system. [facility] [place]  [facility]es [facility] -- 4 Eyes      3/31/2024       Skin assessed on: Q Shift      [x] No Pressure Injuries Present    [x]Prevention Measures Documented    [] Yes LDA  for Pressure Injury Previously documented     [] Yes New Pressure Injury Discovered   [] LDA for New Pressure Injury Added      Attending RN:  Asia Carter RN     Second RN: Sintia Levy RN

## 2024-03-31 NOTE — ASSESSMENT & PLAN NOTE
Spiked fever on 3/27 despite being on antibiotics for 5 days for right ankle cellulitis. ID consulted. CT ankle with no obvious abscess. Possibly right arm from infiltration. Blood cultures negative to date. U/s of right forearm with no hematoma/abscess - no erythema/warmth.  - On meropenem/vancomycin  - ID following  - unclear etiology at this time  ABX's stopped.  IV's removed.  Repeat BCx.

## 2024-03-31 NOTE — NURSING
Poor venous access, limb alert to L arm and swelling noted to R arm.. New PIV started to R hand 24g, blood return noted, flushes w/o difficulty. Pt tolerated well.

## 2024-03-31 NOTE — SUBJECTIVE & OBJECTIVE
Interval History: Febrile this morning. Pt reports he is doing ok, had diarrhea last night.     Review of Systems   Constitutional:  Positive for fever.   Gastrointestinal:  Positive for diarrhea.   All other systems reviewed and are negative.    Objective:     Vital Signs (Most Recent):  Temp: (!) 101.6 °F (38.7 °C) (03/31/24 0837)  Pulse: 92 (03/31/24 0805)  Resp: 20 (03/31/24 0745)  BP: 129/60 (03/31/24 0745)  SpO2: (!) 91 % (03/31/24 0745) Vital Signs (24h Range):  Temp:  [98.2 °F (36.8 °C)-101.6 °F (38.7 °C)] 101.6 °F (38.7 °C)  Pulse:  [78-95] 92  Resp:  [18-20] 20  SpO2:  [91 %-96 %] 91 %  BP: (120-129)/(57-61) 129/60     Weight: 83.5 kg (184 lb 1.4 oz)  Body mass index is 27.99 kg/m².    Estimated Creatinine Clearance: 8.7 mL/min (A) (based on SCr of 8.4 mg/dL (H)).     Physical Exam  Constitutional:       General: He is not in acute distress.     Appearance: He is not ill-appearing.   Pulmonary:      Effort: Pulmonary effort is normal. No respiratory distress.   Abdominal:      General: There is no distension.      Palpations: Abdomen is soft.      Tenderness: There is no abdominal tenderness.   Musculoskeletal:         General: Swelling (RUE - improving) and tenderness (improved RLE ankle tenderness) present.   Skin:     General: Skin is warm and dry.      Comments: L AVF  R EJ   Neurological:      Mental Status: He is alert and oriented to person, place, and time.          Significant Labs:   Microbiology Results (last 7 days)       Procedure Component Value Units Date/Time    Clostridium difficile EIA [[id number]]     Order Status: No result Specimen: Stool     Blood culture [[id number]]     Order Status: Sent Specimen: Blood     Blood culture [[id number]]     Order Status: Sent Specimen: Blood     Blood culture [[id number]] Collected: 03/27/24 1727    Order Status: Completed Specimen: Blood from Peripheral, Hand, Right Updated: 03/30/24 1903     Blood Culture, Routine No Growth to date      No Growth to  date      No Growth to date      No Growth to date    Blood culture [[id number]] Collected: 03/27/24 1719    Order Status: Completed Specimen: Blood from Peripheral, Wrist, Right Updated: 03/30/24 1103     Blood Culture, Routine No Growth to date      No Growth to date      No Growth to date    Blood culture [[id number]] Collected: 03/23/24 1614    Order Status: Completed Specimen: Blood from Peripheral, Hand, Left Updated: 03/27/24 1703     Blood Culture, Routine No Growth after 4 days.    Blood culture [[id number]] Collected: 03/23/24 1615    Order Status: Completed Specimen: Blood Updated: 03/27/24 1703     Blood Culture, Routine No Growth after 4 days.    Blood Culture #2 **CANNOT BE ORDERED STAT** [[id number]] Collected: 03/20/24 1450    Order Status: Completed Specimen: Blood from Peripheral, Hand, Right Updated: 03/24/24 1703     Blood Culture, Routine No Growth after 4 days.    Blood Culture #1 **CANNOT BE ORDERED STAT** [[id number]] Collected: 03/20/24 1449    Order Status: Completed Specimen: Blood from Peripheral, Hand, Right Updated: 03/24/24 1703     Blood Culture, Routine No Growth after 4 days.            Significant Imaging: I have reviewed all pertinent imaging results/findings within the past 24 hours.

## 2024-03-31 NOTE — PROGRESS NOTES
[facility] Medicine  Progress Note    Patient Name: Miguel Moore  MRN: [id number]  Patient Class: IP- Inpatient   Admission Date: 3/20/2024  Length of Stay: 11 days  Attending Physician: Barrie Negro MD  Primary Care Provider: Raciel Raymond MD        Subjective:     Principal Problem:Fever        HPI:  Mr Miguel Moore is a 68 y.o. man with ESRD on HD, HTN, DM with neuropathy, history of CVA with residual R sided weakness, chronic DVT on eliquis who presented with feeling poorly. He attended his usual dialysis session on Monday 3/18 without issues. He developed fatigue and R ankle swelling. He has some pain with palpation but is able to walk. No reports of trauma. No wounds. No falls. No history of gout. Today he was feeling worse so did not go to dialysis and came to the ED. No fevers. No shortness of breath. Normal appetite. No nausea, vomiting. No chest pain. No palpitations.     In the ED, afebrile with HR initially controlled then to 130s Afib RVR. Started diltiazem but became hypotensive. Diltiazem stopped. Given antibiotics. Admitted for further management.     Overview/Hospital Course:  Mr Miguel Moore is a 68 y.o. man with ESRD on HD, DM who was admitted with new onset Afib RVR, hyperkalemia from missed HD session, and R ankle cellulitis. Started amio gtt with conversion to NSR. TTE EF 55-60%, mild LAE. Cardiology consulted. Now on PO amiodarone and continues home eliquis (on this for chronic DVT). He received HD with resolution of hyperkalemia. He is on CTX for his R ankle cellulitis and was given colchicine in case gout could contribute. Also added vanc,foot swelling is improving.This is improving. He developed recurrent Afib and amio gtt resumed. Cardiology was planning SONY/DCCV on 3/22. But converted to sinus. Transferred to floor. Right ankle cellulitis appears to be improving. Started spiking fevers on 3/27 up to 102.7 F. Blood cultures, u/a ordered. Vanc/Ceftriaxone  changed to Meropenem. CT right ankle with no obvious acute infection, pain is improving. Right forearm with swelling, u/s negative for abscess or hematoma. Blood cultures negative. ID following. Unclear etiology of fevers.       Interval History: still spiking fevers    Review of Systems   HENT:  Negative for ear discharge and ear pain.    Eyes:  Negative for discharge and itching.   Endocrine: Negative for cold intolerance and heat intolerance.   Neurological:  Negative for seizures and syncope.     Objective:     Vital Signs (Most Recent):  Temp: 99.2 °F (37.3 °C) (03/31/24 1057)  Pulse: 83 (03/31/24 1057)  Resp: 18 (03/31/24 1330)  BP: 120/60 (03/31/24 1057)  SpO2: (!) 92 % (03/31/24 1057) Vital Signs (24h Range):  Temp:  [98.2 °F (36.8 °C)-101.6 °F (38.7 °C)] 99.2 °F (37.3 °C)  Pulse:  [78-95] 83  Resp:  [18-20] 18  SpO2:  [91 %-96 %] 92 %  BP: (120-129)/(57-60) 120/60     Weight: 83.5 kg (184 lb 1.4 oz)  Body mass index is 27.99 kg/m².    Intake/Output Summary (Last 24 hours) at 3/31/2024 1457  Last data filed at 3/31/2024 1309  Gross per 24 hour   Intake 360 ml   Output --   Net 360 ml         Physical Exam  Vitals and nursing note reviewed.   Constitutional:       General: He is not in acute distress.     Appearance: He is ill-appearing.   Cardiovascular:      Rate and Rhythm: Normal rate.      Pulses: Normal pulses.   Pulmonary:      Effort: Pulmonary effort is normal. No respiratory distress.      Breath sounds: No wheezing.   Abdominal:      General: Bowel sounds are normal.   Musculoskeletal:         General: Swelling present.      Comments: Lower extremities with swelling significantly improved. Right arm with some swelling but no induration, erythema or increased warmth.    Skin:     General: Skin is warm and dry.   Neurological:      Mental Status: He is alert. Mental status is at baseline.   Psychiatric:         Mood and Affect: Mood normal.         Thought Content: Thought content normal.              Significant Labs: All pertinent labs within the past 24 hours have been reviewed.  BMP:   Recent Labs   Lab 03/31/24 0417   *   *   K 4.1      CO2 18*   BUN 41*   CREATININE 8.4*   CALCIUM 7.4*     CBC:   Recent Labs   Lab 03/31/24 0417   WBC 16.12*   HGB 8.6*   HCT 24.8*          Significant Imaging: I have reviewed all pertinent imaging results/findings within the past 24 hours.    Assessment/Plan:      * Fever  Spiked fever on 3/27 despite being on antibiotics for 5 days for right ankle cellulitis. ID consulted. CT ankle with no obvious abscess. Possibly right arm from infiltration. Blood cultures negative to date. U/s of right forearm with no hematoma/abscess - no erythema/warmth.  - On meropenem/vancomycin  - ID following  - unclear etiology at this time  ABX's stopped.  IV's removed.  Repeat BCx.      Open wound        Cellulitis of right foot  R ankle pain, swelling, warmth present. Potential gout vs cellulitis. 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 No fracture present. Uric acid normal. Arterial US and venous US with no clots, appropriate flow  - on ceftriaxone/vanc for 7 days and still with pain and now with increased WBC and fevers  - blood cultures currently NGTD  - CT right ankle with no acute findings  - I do not believe this is source of recurrent fevers at this time      Paroxysmal atrial fibrillation with RVR  Patient has Paroxysmal (<7 days) atrial fibrillation which is uncontrolled. Patient is currently in atrial fibrillation.  - new onset Afib  - on labetalol for BP at home, eliquis for chronic DVT but did miss some doses of eliquis  - in ED given diltiazem gtt but that caused hypotension  - EKG with Afib RVR with normal Qtc  - started amiodarone with conversion to NSR. Changed to PO amiodarone  - however, he had recurrent Afib and was placed back on amio gtt.   - Cardiology consulted  - plan for SONY/DCCV on 3/22/24 but converted  to sinus rhythm. Has been in/out of afib but rate controlled  - continue home eliquis    Lab Results   Component Value Date    TSH 2.672 03/20/2024     - TTE normal EF, mild LAE    [id number] - VASc score:  Congestive Heart Failure or EF < 35% NO   Hypertension YES  +1   Age >= 75 NO   Diabetes Mellitus YES  +1   Stroke/TIA prior history YES  +2   Vascular disease (PAD, MI or Aortic Plaque)? YES  +1   Age 65 - 74 YES  +1   Female NO   Total 6   Sinus at this time.      Anemia in chronic kidney disease  Patient's anemia is currently controlled. Has not received any PRBCs to date. Etiology likely d/t chronic disease due to ESRD  Current CBC reviewed-   Lab Results   Component Value Date    HGB 8.6 (L) 03/31/2024    HCT 24.8 (L) 03/31/2024     Monitor serial CBC and transfuse if patient becomes hemodynamically unstable, symptomatic or H/H drops below 7/21.    Chronic deep vein thrombosis (DVT) of popliteal vein of right lower extremity 9/21/20 outside US; unprovoked; anticoagulation        History of stroke  pT,OT.      Secondary hyperparathyroidism of renal origin  Continue renvela       ESRD (end stage renal disease)  ESRD via LUE AVF. Last session 3/18/24. Missed 3/20/24 due to fatigue. Usually MWF  - hyperkalemic on admit. Does not appear volume overloaded.   - Nephrology Dr Currie consulted. Received HD on 3/20. K normalized.   - continue MWF HD    Hemiplegia and hemiparesis following cerebral infarction affecting right dominant side  No signs of acute stroke. Does have RUE weakness.   - continue home asa, eliquis, statin      Seizure disorder as sequela of cerebrovascular accident  Not currently on antiepileptics. No seizure activity reported. Monitor.       Controlled type 2 diabetes mellitus with chronic kidney disease on chronic dialysis, with long-term current use of insulin  A1c:   Lab Results   Component Value Date    HGBA1C 5.8 (H) 03/20/2024     Meds: SSI PRN to maintain goal 140-180  ADA renal diet,  accuchecks, hypoglycemic protocol      Dyslipidemia  Continue statin      Benign essential HTN  Chronic. Latest blood pressure and vitals reviewed-     Temp:  [98.2 °F (36.8 °C)-101.6 °F (38.7 °C)]   Pulse:  [78-95]   Resp:  [18-20]   BP: (120-129)/(57-60)   SpO2:  [91 %-96 %] .   Home meds for hypertension were reviewed and noted below.   Hypertension Medications               amLODIPine (NORVASC) 10 MG tablet Take 1 tablet by mouth once daily    labetaloL (NORMODYNE) 100 MG tablet Take 1 tablet (100 mg total) by mouth once daily.          - BP Rx held on admit due to hypotension  - BP improved. Resumed home Rx    Will utilize p.r.n. blood pressure medication only if patient's blood pressure greater than 180/110 and he develops symptoms such as worsening chest pain or shortness of breath.      VTE Risk Mitigation (From admission, onward)           Ordered     apixaban tablet 5 mg  2 times daily         03/20/24 7597                    Discharge Planning   GARY:      Code Status: Full Code   Is the patient medically ready for discharge?:     Reason for patient still in hospital (select all that apply): Patient trending condition  Discharge Plan A: Skilled Nursing Facility   Discharge Delays: None known at this time              Barrie Negro MD  Department of Hospital Medicine   [facility] - [place] - Med Surg

## 2024-03-31 NOTE — PLAN OF CARE
Problem: Adult Inpatient Plan of Care  Goal: Plan of Care Review  Outcome: Ongoing, Progressing  Flowsheets (Taken 3/31/2024 0452)  Plan of Care Reviewed With: patient     Problem: Adult Inpatient Plan of Care  Goal: Absence of Hospital-Acquired Illness or Injury  Outcome: Ongoing, Progressing  Intervention: Identify and Manage Fall Risk  Flowsheets (Taken 3/31/2024 0452)  Safety Promotion/Fall Prevention:   assistive device/personal item within reach   bed alarm set   Fall Risk reviewed with patient/family   lighting adjusted   medications reviewed   nonskid shoes/socks when out of bed   room near unit station   instructed to call staff for mobility  Intervention: Prevent Skin Injury  Flowsheets (Taken 3/31/2024 0452)  Body Position: position changed independently  Skin Protection:   adhesive use limited   tubing/devices free from skin contact  Intervention: Prevent Infection  Flowsheets (Taken 3/31/2024 0452)  Infection Prevention:   hand hygiene promoted   single patient room provided   rest/sleep promoted     Problem: Impaired Wound Healing  Goal: Optimal Wound Healing  Outcome: Ongoing, Progressing     Problem: Fall Injury Risk  Goal: Absence of Fall and Fall-Related Injury  Outcome: Ongoing, Progressing

## 2024-03-31 NOTE — ASSESSMENT & PLAN NOTE
Chronic. Latest blood pressure and vitals reviewed-     Temp:  [98.2 °F (36.8 °C)-101.6 °F (38.7 °C)]   Pulse:  [78-95]   Resp:  [18-20]   BP: (120-129)/(57-60)   SpO2:  [91 %-96 %] .   Home meds for hypertension were reviewed and noted below.   Hypertension Medications               amLODIPine (NORVASC) 10 MG tablet Take 1 tablet by mouth once daily    labetaloL (NORMODYNE) 100 MG tablet Take 1 tablet (100 mg total) by mouth once daily.          - BP Rx held on admit due to hypotension  - BP improved. Resumed home Rx    Will utilize p.r.n. blood pressure medication only if patient's blood pressure greater than 180/110 and he develops symptoms such as worsening chest pain or shortness of breath.

## 2024-03-31 NOTE — SUBJECTIVE & OBJECTIVE
Interval History: still spiking fevers    Review of Systems   HENT:  Negative for ear discharge and ear pain.    Eyes:  Negative for discharge and itching.   Endocrine: Negative for cold intolerance and heat intolerance.   Neurological:  Negative for seizures and syncope.     Objective:     Vital Signs (Most Recent):  Temp: 99.2 °F (37.3 °C) (03/31/24 1057)  Pulse: 83 (03/31/24 1057)  Resp: 18 (03/31/24 1330)  BP: 120/60 (03/31/24 1057)  SpO2: (!) 92 % (03/31/24 1057) Vital Signs (24h Range):  Temp:  [98.2 °F (36.8 °C)-101.6 °F (38.7 °C)] 99.2 °F (37.3 °C)  Pulse:  [78-95] 83  Resp:  [18-20] 18  SpO2:  [91 %-96 %] 92 %  BP: (120-129)/(57-60) 120/60     Weight: 83.5 kg (184 lb 1.4 oz)  Body mass index is 27.99 kg/m².    Intake/Output Summary (Last 24 hours) at 3/31/2024 1457  Last data filed at 3/31/2024 1309  Gross per 24 hour   Intake 360 ml   Output --   Net 360 ml         Physical Exam  Vitals and nursing note reviewed.   Constitutional:       General: He is not in acute distress.     Appearance: He is ill-appearing.   Cardiovascular:      Rate and Rhythm: Normal rate.      Pulses: Normal pulses.   Pulmonary:      Effort: Pulmonary effort is normal. No respiratory distress.      Breath sounds: No wheezing.   Abdominal:      General: Bowel sounds are normal.   Musculoskeletal:         General: Swelling present.      Comments: Lower extremities with swelling significantly improved. Right arm with some swelling but no induration, erythema or increased warmth.    Skin:     General: Skin is warm and dry.   Neurological:      Mental Status: He is alert. Mental status is at baseline.   Psychiatric:         Mood and Affect: Mood normal.         Thought Content: Thought content normal.             Significant Labs: All pertinent labs within the past 24 hours have been reviewed.  BMP:   Recent Labs   Lab 03/31/24  0417   *   *   K 4.1      CO2 18*   BUN 41*   CREATININE 8.4*   CALCIUM 7.4*     CBC:    Recent Labs   Lab 03/31/24  0417   WBC 16.12*   HGB 8.6*   HCT 24.8*          Significant Imaging: I have reviewed all pertinent imaging results/findings within the past 24 hours.

## 2024-03-31 NOTE — ASSESSMENT & PLAN NOTE
Patient has Paroxysmal (<7 days) atrial fibrillation which is uncontrolled. Patient is currently in atrial fibrillation.  - new onset Afib  - on labetalol for BP at home, eliquis for chronic DVT but did miss some doses of eliquis  - in ED given diltiazem gtt but that caused hypotension  - EKG with Afib RVR with normal Qtc  - started amiodarone with conversion to NSR. Changed to PO amiodarone  - however, he had recurrent Afib and was placed back on amio gtt.   - Cardiology consulted  - plan for SONY/DCCV on 3/22/24 but converted to sinus rhythm. Has been in/out of afib but rate controlled  - continue home eliquis    Lab Results   Component Value Date    TSH 2.672 03/20/2024     - TTE normal EF, mild LAE    [id number] - VASc score:  Congestive Heart Failure or EF < 35% NO   Hypertension YES  +1   Age >= 75 NO   Diabetes Mellitus YES  +1   Stroke/TIA prior history YES  +2   Vascular disease (PAD, MI or Aortic Plaque)? YES  +1   Age 65 - 74 YES  +1   Female NO   Total 6   Sinus at this time.

## 2024-03-31 NOTE — ASSESSMENT & PLAN NOTE
Patient's anemia is currently controlled. Has not received any PRBCs to date. Etiology likely d/t chronic disease due to ESRD  Current CBC reviewed-   Lab Results   Component Value Date    HGB 8.6 (L) 03/31/2024    HCT 24.8 (L) 03/31/2024     Monitor serial CBC and transfuse if patient becomes hemodynamically unstable, symptomatic or H/H drops below 7/21.

## 2024-03-31 NOTE — PROGRESS NOTES
[facility] - Med Surg  Infectious Disease  Progress Note    Patient Name: Miguel Moore  MRN: [id number]  Admission Date: 3/20/2024  Length of Stay: 11 days  Attending Physician: Barrie Negro MD  Primary Care Provider: Raciel Raymond MD    Isolation Status: Special Contact  Assessment/Plan:      Other  * Fever  I independently reviewed patient's lab work and images as documented. 67 yo male with ESRD on HD, DM and CVA with R sided weakness admitted for fatigue, hyperkalemia, found to have Afib with RVR. Admission course notable for R ankle cellulitis (completed 10d of abx therapy), CT with extensive subcutaneous edema, no focal fluid collection or OM seen. He spiked episodic fevers- blood cx obtained ngtd. TTE on 3/20 without IE. Mild swelling around ankle, pt reported interval improvement in pain and swelling since admission - no ongoing cellulitis. Dopplers negative. Unclear source of fevers, infectious work up so far negative/in process- ?drug fever.     Recommendations  -remove old PIV (R EJ present)  -blood cultures repeated x 2 (ordered)  -continued diarrhea off laxatives, ordered Cdiff  -consider CT c/a/p for further evaluation if persistently febrile            Thank you for your consult. I will follow-up with patient. Please contact us if you have any additional questions. Above d/w primary team.       Laney Underwood MD  Infectious Disease  [facility] - Med Surg    Subjective:     Principal Problem:Fever    HPI: 67 yo male with ESRD on HD, DM and CVA with R sided weakness admitted for fatigue, hyperkalemia, found to have Afib with RVR. ID consulted for fevers. Admission course notable for R ankle cellulitis, CT with extensive subcutaneous edema, no focal fluid collection or OM seen. He spiked a fever on 3/27 and 3/28 - blood cx obtained ngtd. TTE on 3/20 without IE. Pt is currently on vancomycin and meropenem. Prior to admission, pt reported he had pain in R ankle, denied fevers chills, sick  contacts. He states that since he's been here, he has had some loose stools and RUE swelling. Reported hives with PCN in ithe past, tolerated carb/cephs.   Interval History: Febrile this morning. Pt reports he is doing ok, had diarrhea last night.     Review of Systems   Constitutional:  Positive for fever.   Gastrointestinal:  Positive for diarrhea.   All other systems reviewed and are negative.    Objective:     Vital Signs (Most Recent):  Temp: (!) 101.6 °F (38.7 °C) (03/31/24 0837)  Pulse: 92 (03/31/24 0805)  Resp: 20 (03/31/24 0745)  BP: 129/60 (03/31/24 0745)  SpO2: (!) 91 % (03/31/24 0745) Vital Signs (24h Range):  Temp:  [98.2 °F (36.8 °C)-101.6 °F (38.7 °C)] 101.6 °F (38.7 °C)  Pulse:  [78-95] 92  Resp:  [18-20] 20  SpO2:  [91 %-96 %] 91 %  BP: (120-129)/(57-61) 129/60     Weight: 83.5 kg (184 lb 1.4 oz)  Body mass index is 27.99 kg/m².    Estimated Creatinine Clearance: 8.7 mL/min (A) (based on SCr of 8.4 mg/dL (H)).     Physical Exam  Constitutional:       General: He is not in acute distress.     Appearance: He is not ill-appearing.   Pulmonary:      Effort: Pulmonary effort is normal. No respiratory distress.   Abdominal:      General: There is no distension.      Palpations: Abdomen is soft.      Tenderness: There is no abdominal tenderness.   Musculoskeletal:         General: Swelling (RUE - improving) and tenderness (improved RLE ankle tenderness) present.   Skin:     General: Skin is warm and dry.      Comments: L AVF  R EJ   Neurological:      Mental Status: He is alert and oriented to person, place, and time.          Significant Labs:   Microbiology Results (last 7 days)       Procedure Component Value Units Date/Time    Clostridium difficile EIA [[id number]]     Order Status: No result Specimen: Stool     Blood culture [[id number]]     Order Status: Sent Specimen: Blood     Blood culture [[id number]]     Order Status: Sent Specimen: Blood     Blood culture [[id number]] Collected: 03/27/24 1722     Order Status: Completed Specimen: Blood from Peripheral, Hand, Right Updated: 03/30/24 1903     Blood Culture, Routine No Growth to date      No Growth to date      No Growth to date      No Growth to date    Blood culture [[id number]] Collected: 03/27/24 1719    Order Status: Completed Specimen: Blood from Peripheral, Wrist, Right Updated: 03/30/24 1103     Blood Culture, Routine No Growth to date      No Growth to date      No Growth to date    Blood culture [[id number]] Collected: 03/23/24 1614    Order Status: Completed Specimen: Blood from Peripheral, Hand, Left Updated: 03/27/24 1703     Blood Culture, Routine No Growth after 4 days.    Blood culture [[id number]] Collected: 03/23/24 1615    Order Status: Completed Specimen: Blood Updated: 03/27/24 1703     Blood Culture, Routine No Growth after 4 days.    Blood Culture #2 **CANNOT BE ORDERED STAT** [[id number]] Collected: 03/20/24 1450    Order Status: Completed Specimen: Blood from Peripheral, Hand, Right Updated: 03/24/24 1703     Blood Culture, Routine No Growth after 4 days.    Blood Culture #1 **CANNOT BE ORDERED STAT** [[id number]] Collected: 03/20/24 1449    Order Status: Completed Specimen: Blood from Peripheral, Hand, Right Updated: 03/24/24 1703     Blood Culture, Routine No Growth after 4 days.            Significant Imaging: I have reviewed all pertinent imaging results/findings within the past 24 hours.

## 2024-03-31 NOTE — ASSESSMENT & PLAN NOTE
I independently reviewed patient's lab work and images as documented. 67 yo male with ESRD on HD, DM and CVA with R sided weakness admitted for fatigue, hyperkalemia, found to have Afib with RVR. Admission course notable for R ankle cellulitis (completed 10d of abx therapy), CT with extensive subcutaneous edema, no focal fluid collection or OM seen. He spiked episodic fevers- blood cx obtained ngtd. TTE on 3/20 without IE. Mild swelling around ankle, pt reported interval improvement in pain and swelling since admission - no ongoing cellulitis. Dopplers negative. Unclear source of fevers, infectious work up so far negative/in process.     Recommendations  -remove old PIV (R EJ present)  -blood cultures repeated x 2 (ordered)  -continued diarrhea off laxatives, ordered Cdiff  -consider CT c/a/p for further evaluation if persistently febrile

## 2024-04-01 LAB
ALBUMIN SERPL BCP-MCNC: 1.5 G/DL (ref 3.5–5.2)
ALP SERPL-CCNC: 129 U/L (ref 55–135)
ALT SERPL W/O P-5'-P-CCNC: 22 U/L (ref 10–44)
ANION GAP SERPL CALC-SCNC: 13 MMOL/L (ref 8–16)
AST SERPL-CCNC: 33 U/L (ref 10–40)
BACTERIA BLD CULT: NORMAL
BASOPHILS # BLD AUTO: 0.06 K/UL (ref 0–0.2)
BASOPHILS NFR BLD: 0.3 % (ref 0–1.9)
BILIRUB SERPL-MCNC: 0.8 MG/DL (ref 0.1–1)
BUN SERPL-MCNC: 50 MG/DL (ref 8–23)
CALCIUM SERPL-MCNC: 7.5 MG/DL (ref 8.7–10.5)
CHLORIDE SERPL-SCNC: 100 MMOL/L (ref 95–110)
CO2 SERPL-SCNC: 19 MMOL/L (ref 23–29)
CREAT SERPL-MCNC: 10.3 MG/DL (ref 0.5–1.4)
DIFFERENTIAL METHOD BLD: ABNORMAL
EOSINOPHIL # BLD AUTO: 0 K/UL (ref 0–0.5)
EOSINOPHIL NFR BLD: 0.2 % (ref 0–8)
ERYTHROCYTE [DISTWIDTH] IN BLOOD BY AUTOMATED COUNT: 22.2 % (ref 11.5–14.5)
EST. GFR  (NO RACE VARIABLE): 5 ML/MIN/1.73 M^2
GLUCOSE SERPL-MCNC: 128 MG/DL (ref 70–110)
HCT VFR BLD AUTO: 23.6 % (ref 40–54)
HGB BLD-MCNC: 8.2 G/DL (ref 14–18)
IMM GRANULOCYTES # BLD AUTO: 0.21 K/UL (ref 0–0.04)
IMM GRANULOCYTES NFR BLD AUTO: 1.2 % (ref 0–0.5)
LYMPHOCYTES # BLD AUTO: 1.2 K/UL (ref 1–4.8)
LYMPHOCYTES NFR BLD: 7 % (ref 18–48)
MCH RBC QN AUTO: 25 PG (ref 27–31)
MCHC RBC AUTO-ENTMCNC: 34.7 G/DL (ref 32–36)
MCV RBC AUTO: 72 FL (ref 82–98)
MONOCYTES # BLD AUTO: 1.3 K/UL (ref 0.3–1)
MONOCYTES NFR BLD: 7.7 % (ref 4–15)
NEUTROPHILS # BLD AUTO: 14.6 K/UL (ref 1.8–7.7)
NEUTROPHILS NFR BLD: 83.6 % (ref 38–73)
NRBC BLD-RTO: 0 /100 WBC
PLATELET # BLD AUTO: 327 K/UL (ref 150–450)
PMV BLD AUTO: 10.1 FL (ref 9.2–12.9)
POCT GLUCOSE: 139 MG/DL (ref 70–110)
POCT GLUCOSE: 150 MG/DL (ref 70–110)
POCT GLUCOSE: 165 MG/DL (ref 70–110)
POCT GLUCOSE: 185 MG/DL (ref 70–110)
POTASSIUM SERPL-SCNC: 4.3 MMOL/L (ref 3.5–5.1)
PROT SERPL-MCNC: 4.6 G/DL (ref 6–8.4)
RBC # BLD AUTO: 3.28 M/UL (ref 4.6–6.2)
SODIUM SERPL-SCNC: 132 MMOL/L (ref 136–145)
WBC # BLD AUTO: 17.48 K/UL (ref 3.9–12.7)

## 2024-04-01 PROCEDURE — 25000003 PHARM REV CODE 250: Mod: HCNC | Performed by: HOSPITALIST

## 2024-04-01 PROCEDURE — 21400001 HC TELEMETRY ROOM: Mod: HCNC

## 2024-04-01 PROCEDURE — 63600175 PHARM REV CODE 636 W HCPCS: Mod: JG,HCNC | Performed by: INTERNAL MEDICINE

## 2024-04-01 PROCEDURE — 80053 COMPREHEN METABOLIC PANEL: CPT | Mod: HCNC | Performed by: STUDENT IN AN ORGANIZED HEALTH CARE EDUCATION/TRAINING PROGRAM

## 2024-04-01 PROCEDURE — 99233 SBSQ HOSP IP/OBS HIGH 50: CPT | Mod: HCNC,,, | Performed by: INTERNAL MEDICINE

## 2024-04-01 PROCEDURE — 80100014 HC HEMODIALYSIS 1:1: Mod: HCNC

## 2024-04-01 PROCEDURE — 63600175 PHARM REV CODE 636 W HCPCS: Mod: HCNC | Performed by: STUDENT IN AN ORGANIZED HEALTH CARE EDUCATION/TRAINING PROGRAM

## 2024-04-01 PROCEDURE — 27000207 HC ISOLATION: Mod: HCNC

## 2024-04-01 PROCEDURE — 85025 COMPLETE CBC W/AUTO DIFF WBC: CPT | Mod: HCNC | Performed by: STUDENT IN AN ORGANIZED HEALTH CARE EDUCATION/TRAINING PROGRAM

## 2024-04-01 PROCEDURE — 36415 COLL VENOUS BLD VENIPUNCTURE: CPT | Mod: HCNC | Performed by: STUDENT IN AN ORGANIZED HEALTH CARE EDUCATION/TRAINING PROGRAM

## 2024-04-01 RX ADMIN — ATORVASTATIN CALCIUM 80 MG: 40 TABLET, FILM COATED ORAL at 09:04

## 2024-04-01 RX ADMIN — NEPHROCAP 1 CAPSULE: 1 CAP ORAL at 09:04

## 2024-04-01 RX ADMIN — AMIODARONE HYDROCHLORIDE 400 MG: 200 TABLET ORAL at 09:04

## 2024-04-01 RX ADMIN — AMIODARONE HYDROCHLORIDE 400 MG: 200 TABLET ORAL at 08:04

## 2024-04-01 RX ADMIN — TAMSULOSIN HYDROCHLORIDE 0.8 MG: 0.4 CAPSULE ORAL at 09:04

## 2024-04-01 RX ADMIN — FINASTERIDE 5 MG: 5 TABLET, FILM COATED ORAL at 09:04

## 2024-04-01 RX ADMIN — EPOETIN ALFA-EPBX 5000 UNITS: 10000 INJECTION, SOLUTION INTRAVENOUS; SUBCUTANEOUS at 09:04

## 2024-04-01 RX ADMIN — MIDODRINE HYDROCHLORIDE 10 MG: 5 TABLET ORAL at 09:04

## 2024-04-01 RX ADMIN — ACETAMINOPHEN 650 MG: 325 TABLET ORAL at 08:04

## 2024-04-01 RX ADMIN — MIDODRINE HYDROCHLORIDE 10 MG: 5 TABLET ORAL at 05:04

## 2024-04-01 RX ADMIN — APIXABAN 5 MG: 5 TABLET, FILM COATED ORAL at 08:04

## 2024-04-01 RX ADMIN — MIDODRINE HYDROCHLORIDE 10 MG: 5 TABLET ORAL at 08:04

## 2024-04-01 RX ADMIN — HYDROMORPHONE HYDROCHLORIDE 0.5 MG: 1 INJECTION, SOLUTION INTRAMUSCULAR; INTRAVENOUS; SUBCUTANEOUS at 05:04

## 2024-04-01 RX ADMIN — APIXABAN 5 MG: 5 TABLET, FILM COATED ORAL at 09:04

## 2024-04-01 NOTE — ASSESSMENT & PLAN NOTE
I independently reviewed patient's lab work and images as documented. 69 yo male with ESRD on HD, DM and CVA with R sided weakness admitted for fatigue, hyperkalemia, found to have Afib with RVR. Admission course notable for R ankle cellulitis (completed 10d of abx therapy), CT with extensive subcutaneous edema, no focal fluid collection or OM seen. He spiked episodic fevers- blood cx obtained ngtd. TTE on 3/20 without IE. Mild swelling around ankle, pt reported interval improvement in pain and swelling since admission - no ongoing cellulitis. Dopplers negative. Unclear source of fevers, infectious work up so far negative/in process. Fever continues - patient stable and without complaints. Imaging ordered and is pending.    Recommendations  - repeat CT chest/abd/pelvis for further evaluation  - f/u blood cultures  - if not helpful in determining etiology of fever, consider NM imaging

## 2024-04-01 NOTE — NURSING
[facility] [facility] [place] 4 [place]      3/31/2024       Skin assessed on: Q Shift      [x] No Pressure Injuries Present    [x]Prevention Measures Documented    [] Yes LDA  for Pressure Injury Previously documented     [] Yes New Pressure Injury Discovered   [] LDA for New Pressure Injury Added      Attending RN:  Sintia Levy RN     Second RN:   Asia Carter RN

## 2024-04-01 NOTE — ASSESSMENT & PLAN NOTE
Chronic. Latest blood pressure and vitals reviewed-     Temp:  [97 °F (36.1 °C)-100.3 °F (37.9 °C)]   Pulse:  [80-90]   Resp:  [16-20]   BP: ()/(44-79)   SpO2:  [91 %-95 %] .   Home meds for hypertension were reviewed and noted below.   Hypertension Medications               amLODIPine (NORVASC) 10 MG tablet Take 1 tablet by mouth once daily    labetaloL (NORMODYNE) 100 MG tablet Take 1 tablet (100 mg total) by mouth once daily.          - BP Rx held on admit due to hypotension  - BP improved. Resumed home Rx    Will utilize p.r.n. blood pressure medication only if patient's blood pressure greater than 180/110 and he develops symptoms such as worsening chest pain or shortness of breath.

## 2024-04-01 NOTE — ASSESSMENT & PLAN NOTE
Patient's anemia is currently controlled. Has not received any PRBCs to date. Etiology likely d/t chronic disease due to ESRD  Current CBC reviewed-   Lab Results   Component Value Date    HGB 8.2 (L) 04/01/2024    HCT 23.6 (L) 04/01/2024     Monitor serial CBC and transfuse if patient becomes hemodynamically unstable, symptomatic or H/H drops below 7/21.

## 2024-04-01 NOTE — ASSESSMENT & PLAN NOTE
R ankle pain, swelling, warmth present. Potential gout vs cellulitis. 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 No fracture present. Uric acid normal. Arterial US and venous US with no clots, appropriate flow  - on ceftriaxone/vanc for 7 days and still with pain and now with increased WBC and fevers  - blood cultures currently NGTD  - CT right ankle with no evidence of osteo.  - I do not believe this is source of recurrent fevers at this time  ABX's stopped.

## 2024-04-01 NOTE — PROGRESS NOTES
68 y o m with hx of esrd htn anemia 2nd hyperpth     Denies CNS ENT CP GI  OR DERM SX  Past Medical History:   Diagnosis Date    Allergy     Clotting disorder     Elaquis and APlavix    Deep vein thrombosis     Diabetes mellitus, type 2     Hypertension     Nuclear sclerosis of both eyes 6/9/2017    Renal disorder     Seizures     Stroke     Urinary tract infection      Past Surgical History:   Procedure Laterality Date    CATARACT EXTRACTION W/  INTRAOCULAR LENS IMPLANT Right 10/05/2017    Dr. Tay    CATARACT EXTRACTION W/  INTRAOCULAR LENS IMPLANT Left 10/19/2017    Dr. Tay    CYSTOSCOPY W/ RETROGRADES Bilateral 2/18/2021    Procedure: CYSTOSCOPY, WITH RETROGRADE PYELOGRAM;  Surgeon: JEMMA Styles MD;  Location: [facility] OR;  Service: Urology;  Laterality: Bilateral;  REQUESTING EARLY AS POSSIBE-LO / 2/8/2021 @ 1:13PM  RN Pre Op 2-11-21, Covid NEGATIVE ON  2-17-21.  C A    ESOPHAGOGASTRODUODENOSCOPY N/A 8/17/2020    Procedure: EGD (ESOPHAGOGASTRODUODENOSCOPY);  Surgeon: Desmond Chapman MD;  Location: [facility] ENDO;  Service: Endoscopy;  Laterality: N/A;    EYE SURGERY Bilateral     cataract    FEMORAL ARTERY STENT      FRACTURE SURGERY      KNEE SURGERY      bilateral scope     Social History     Socioeconomic History    Marital status: Single   Occupational History    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, etc   Tobacco Use    Smoking status: Never    Smokeless tobacco: Never   Substance and Sexual Activity    Alcohol use: No    Drug use: No   Social History Narrative    Lives with daughter     Family History   Problem Relation Age of Onset    Diabetes Mother     Heart disease Mother     Hypertension Mother     Cataracts Mother     No Known Problems Father     Hypertension Sister     No Known Problems Brother     No Known Problems Maternal Aunt     No Known Problems Maternal Uncle     No Known Problems Paternal Aunt     No Known Problems Paternal Uncle     No Known Problems  Maternal Grandmother     No Known Problems Maternal Grandfather     No Known Problems Paternal Grandmother     No Known Problems Paternal Grandfather     No Known Problems Daughter     No Known Problems Other     Amblyopia Neg Hx     Blindness Neg Hx     Cancer Neg Hx     Glaucoma Neg Hx     Macular degeneration Neg Hx     Retinal detachment Neg Hx     Strabismus Neg Hx     Stroke Neg Hx     Thyroid disease Neg Hx        Review of patient's allergies indicates:   Allergen Reactions    Ace inhibitors      Hyperkalemia 8/2018  Other reaction(s): Unknown    Penicillins Hives    Tizanidine      Felt hot       Current Facility-Administered Medications   Medication    acetaminophen tablet 650 mg    amiodarone tablet 400 mg    apixaban tablet 5 mg    atorvastatin tablet 80 mg    dextrose 10% bolus 125 mL 125 mL    dextrose 10% bolus 250 mL 250 mL    diphenoxylate-atropine 2.5-0.025 mg per tablet 1 tablet    epoetin marisol-epbx injection 5,000 Units    finasteride tablet 5 mg    glucagon (human recombinant) injection 1 mg    glucose chewable tablet 16 g    glucose chewable tablet 24 g    HYDROmorphone injection 0.5 mg    insulin aspart U-100 pen 0-5 Units    melatonin tablet 6 mg    midodrine tablet 10 mg    naloxone 0.4 mg/mL injection 0.02 mg    ondansetron injection 4 mg    prochlorperazine injection Soln 5 mg    sodium chloride 0.9% bolus 250 mL 250 mL    tamsulosin 24 hr capsule 0.8 mg    vitamin renal formula (B-complex-vitamin c-folic acid) 1 mg per capsule 1 capsule       LABS    Recent Results (from the past 24 hour(s))   POCT glucose    Collection Time: 03/31/24  4:12 PM   Result Value Ref Range    POCT Glucose 189 (H) 70 - 110 mg/dL   POCT glucose    Collection Time: 03/31/24  7:35 PM   Result Value Ref Range    POCT Glucose 174 (H) 70 - 110 mg/dL   CBC Auto Differential    Collection Time: 04/01/24  4:51 AM   Result Value Ref Range    WBC 17.48 (H) 3.90 - 12.70 K/uL    RBC 3.28 (L) 4.60 - 6.20 M/uL    Hemoglobin  8.2 (L) 14.0 - 18.0 g/dL    Hematocrit 23.6 (L) 40.0 - 54.0 %    MCV 72 (L) 82 - 98 fL    MCH 25.0 (L) 27.0 - 31.0 pg    MCHC 34.7 32.0 - 36.0 g/dL    RDW 22.2 (H) 11.5 - 14.5 %    Platelets 327 150 - 450 K/uL    MPV 10.1 9.2 - 12.9 fL    Immature Granulocytes 1.2 (H) 0.0 - 0.5 %    Gran # (ANC) 14.6 (H) 1.8 - 7.7 K/uL    Immature Grans (Abs) 0.21 (H) 0.00 - 0.04 K/uL    Lymph # 1.2 1.0 - 4.8 K/uL    Mono # 1.3 (H) 0.3 - 1.0 K/uL    Eos # 0.0 0.0 - 0.5 K/uL    Baso # 0.06 0.00 - 0.20 K/uL    nRBC 0 0 /100 WBC    Gran % 83.6 (H) 38.0 - 73.0 %    Lymph % 7.0 (L) 18.0 - 48.0 %    Mono % 7.7 4.0 - 15.0 %    Eosinophil % 0.2 0.0 - 8.0 %    Basophil % 0.3 0.0 - 1.9 %    Differential Method Automated    Comprehensive Metabolic Panel    Collection Time: 04/01/24  4:51 AM   Result Value Ref Range    Sodium 132 (L) 136 - 145 mmol/L    Potassium 4.3 3.5 - 5.1 mmol/L    Chloride 100 95 - 110 mmol/L    CO2 19 (L) 23 - 29 mmol/L    Glucose 128 (H) 70 - 110 mg/dL    BUN 50 (H) 8 - 23 mg/dL    Creatinine 10.3 (H) 0.5 - 1.4 mg/dL    Calcium 7.5 (L) 8.7 - 10.5 mg/dL    Total Protein 4.6 (L) 6.0 - 8.4 g/dL    Albumin 1.5 (L) 3.5 - 5.2 g/dL    Total Bilirubin 0.8 0.1 - 1.0 mg/dL    Alkaline Phosphatase 129 55 - 135 U/L    AST 33 10 - 40 U/L    ALT 22 10 - 44 U/L    eGFR 5 (A) >60 mL/min/1.73 m^2    Anion Gap 13 8 - 16 mmol/L   POCT glucose    Collection Time: 04/01/24  7:25 AM   Result Value Ref Range    POCT Glucose 185 (H) 70 - 110 mg/dL   POCT glucose    Collection Time: 04/01/24 11:13 AM   Result Value Ref Range    POCT Glucose 150 (H) 70 - 110 mg/dL   ]    I/O last 3 completed shifts:  In: 360 [P.O.:360]  Out: 0     Vitals:    04/01/24 1245 04/01/24 1315 04/01/24 1516 04/01/24 1557   BP: (!) 103/44 (!) 99/52  (!) 129/59   Pulse: 80 80 96 103   Resp: 16 16     Temp:       TempSrc:       SpO2:       Weight:       Height:           No Jvd, Thyromegaly or Lymphadenopathy  Lungs: Fairly clear anteriorly and laterally  Cor: RRR no G or  rubs  Abd: Soft benign good bowel sounds non tender  Ext: no edema     A)    ESRD MWF   Htn  Anemia  2nd hypth po4 3.5 no need for binders right now  Dm  Hypoalb   Hx of high PSA     P)    HD MWF  Renal Diet  Home meds  Protect access  EPO prn (iron prn )  Binders prn   Adjust all meds to the degree of renal fx  Close follow up I/O and weights  Maintain Hydration   Antibiotx as needed

## 2024-04-01 NOTE — PLAN OF CARE
Dialysis patient from home. Refused SNF. Home health is next option. Patient is stable but has a fever, source unknown. ID following.        04/01/24 1734   Discharge Reassessment   Assessment Type Discharge Planning Reassessment   Did the patient's condition or plan change since previous assessment? No   Communicated GARY with patient/caregiver Date not available/Unable to determine   Discharge Plan A Home Health   Discharge Plan B Home Health   DME Needed Upon Discharge  none   Transition of Care Barriers None   Post-Acute Status   Post-Acute Authorization Home Health   Home Health Status Pending medical clearance/testing   Discharge Delays None known at this time

## 2024-04-01 NOTE — PROGRESS NOTES
[facility] Medicine  Progress Note    Patient Name: Miguel Moore  MRN: [id number]  Patient Class: IP- Inpatient   Admission Date: 3/20/2024  Length of Stay: 12 days  Attending Physician: Barrie Negro MD  Primary Care Provider: Raciel Raymond MD        Subjective:     Principal Problem:Fever        HPI:  Mr Miguel Moore is a 68 y.o. man with ESRD on HD, HTN, DM with neuropathy, history of CVA with residual R sided weakness, chronic DVT on eliquis who presented with feeling poorly. He attended his usual dialysis session on Monday 3/18 without issues. He developed fatigue and R ankle swelling. He has some pain with palpation but is able to walk. No reports of trauma. No wounds. No falls. No history of gout. Today he was feeling worse so did not go to dialysis and came to the ED. No fevers. No shortness of breath. Normal appetite. No nausea, vomiting. No chest pain. No palpitations.     In the ED, afebrile with HR initially controlled then to 130s Afib RVR. Started diltiazem but became hypotensive. Diltiazem stopped. Given antibiotics. Admitted for further management.     Overview/Hospital Course:  Mr Miguel Moore is a 68 y.o. man with ESRD on HD, DM who was admitted with new onset Afib RVR, hyperkalemia from missed HD session, and R ankle cellulitis. Started amio gtt with conversion to NSR. TTE EF 55-60%, mild LAE. Cardiology consulted. Now on PO amiodarone and continues home eliquis (on this for chronic DVT). He received HD with resolution of hyperkalemia. He is on CTX for his R ankle cellulitis and was given colchicine in case gout could contribute. Also added vanc,foot swelling is improving.This is improving. He developed recurrent Afib and amio gtt resumed. Cardiology was planning SONY/DCCV on 3/22. But converted to sinus. Transferred to floor. Right ankle cellulitis appears to be improving. Started spiking fevers on 3/27 up to 102.7 F. Blood cultures, u/a ordered. Vanc/Ceftriaxone  changed to Meropenem. CT right ankle with no obvious acute infection, pain is improving. Right forearm with swelling, u/s negative for abscess or hematoma. Blood cultures negative. ID following. Unclear etiology of fevers.       Interval History: right arm swelling.      Review of Systems   HENT:  Negative for ear discharge and ear pain.    Eyes:  Negative for discharge and itching.   Endocrine: Negative for cold intolerance and heat intolerance.   Neurological:  Negative for seizures and syncope.     Objective:     Vital Signs (Most Recent):  Temp: 97 °F (36.1 °C) (04/01/24 0945)  Pulse: 80 (04/01/24 1315)  Resp: 16 (04/01/24 1315)  BP: (!) 99/52 (04/01/24 1315)  SpO2: (!) 92 % (04/01/24 0724) Vital Signs (24h Range):  Temp:  [97 °F (36.1 °C)-100.3 °F (37.9 °C)] 97 °F (36.1 °C)  Pulse:  [80-90] 80  Resp:  [16-20] 16  SpO2:  [91 %-95 %] 92 %  BP: ()/(44-79) 99/52     Weight: 83.5 kg (184 lb 1.4 oz)  Body mass index is 27.99 kg/m².    Intake/Output Summary (Last 24 hours) at 4/1/2024 1452  Last data filed at 4/1/2024 1315  Gross per 24 hour   Intake 240 ml   Output 2000 ml   Net -1760 ml           Physical Exam  Vitals and nursing note reviewed.   Constitutional:       General: He is not in acute distress.     Appearance: He is ill-appearing.   Cardiovascular:      Rate and Rhythm: Normal rate.      Pulses: Normal pulses.   Pulmonary:      Effort: Pulmonary effort is normal. No respiratory distress.      Breath sounds: No wheezing.   Abdominal:      General: Bowel sounds are normal.   Musculoskeletal:         General: Swelling present.      Comments: Lower extremities with swelling significantly improved. Right arm swelling but no induration, erythema or increased warmth.    Skin:     General: Skin is warm and dry.   Neurological:      Mental Status: He is alert. Mental status is at baseline.   Psychiatric:         Mood and Affect: Mood normal.         Thought Content: Thought content normal.              Significant Labs: All pertinent labs within the past 24 hours have been reviewed.  BMP:   Recent Labs   Lab 04/01/24  0451   *   *   K 4.3      CO2 19*   BUN 50*   CREATININE 10.3*   CALCIUM 7.5*       CBC:   Recent Labs   Lab 03/31/24  0417 04/01/24  0451   WBC 16.12* 17.48*   HGB 8.6* 8.2*   HCT 24.8* 23.6*    327         Significant Imaging: I have reviewed all pertinent imaging results/findings within the past 24 hours.    Assessment/Plan:      * Fever  Spiked fever on 3/27 despite being on antibiotics for 5 days for right ankle cellulitis. ID consulted. CT ankle with no obvious abscess. Possibly right arm from infiltration. Blood cultures negative to date. U/s of right forearm with no hematoma/abscess - no erythema/warmth.  - On meropenem/vancomycin  - ID following  - unclear etiology at this time  ABX's stopped.  IV's removed.  Repeat BCx.  CT chest/abd/pelvis      Open wound        Cellulitis of right foot  R ankle pain, swelling, warmth present. Potential gout vs cellulitis. 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 No fracture present. Uric acid normal. Arterial US and venous US with no clots, appropriate flow  - on ceftriaxone/vanc for 7 days and still with pain and now with increased WBC and fevers  - blood cultures currently NGTD  - CT right ankle with no evidence of osteo.  - I do not believe this is source of recurrent fevers at this time  ABX's stopped.      Paroxysmal atrial fibrillation with RVR  Patient has Paroxysmal (<7 days) atrial fibrillation which is uncontrolled. Patient is currently in atrial fibrillation.  - new onset Afib  - on labetalol for BP at home, eliquis for chronic DVT but did miss some doses of eliquis  - in ED given diltiazem gtt but that caused hypotension  - EKG with Afib RVR with normal Qtc  - started amiodarone with conversion to NSR. Changed to PO amiodarone  - however, he had recurrent Afib and was placed back on  amio gtt.   - Cardiology consulted  - plan for SONY/DCCV on 3/22/24 but converted to sinus rhythm. Has been in/out of afib but rate controlled  - continue home eliquis    Lab Results   Component Value Date    TSH 2.672 03/20/2024     - TTE normal EF, mild LAE    [id number] - VASc score:  Congestive Heart Failure or EF < 35% NO   Hypertension YES  +1   Age >= 75 NO   Diabetes Mellitus YES  +1   Stroke/TIA prior history YES  +2   Vascular disease (PAD, MI or Aortic Plaque)? YES  +1   Age 65 - 74 YES  +1   Female NO   Total 6   Sinus at this time.      Anemia in chronic kidney disease  Patient's anemia is currently controlled. Has not received any PRBCs to date. Etiology likely d/t chronic disease due to ESRD  Current CBC reviewed-   Lab Results   Component Value Date    HGB 8.2 (L) 04/01/2024    HCT 23.6 (L) 04/01/2024     Monitor serial CBC and transfuse if patient becomes hemodynamically unstable, symptomatic or H/H drops below 7/21.    Chronic deep vein thrombosis (DVT) of popliteal vein of right lower extremity 9/21/20 outside US; unprovoked; anticoagulation        History of stroke  pT,OT.      Secondary hyperparathyroidism of renal origin  Continue renvela       ESRD (end stage renal disease)  ESRD via LUE AVF. Last session 3/18/24. Missed 3/20/24 due to fatigue. Usually MWF  - hyperkalemic on admit. Does not appear volume overloaded.   - Nephrology Dr Currie consulted. Received HD on 3/20. K normalized.   - continue MWF HD    Hemiplegia and hemiparesis following cerebral infarction affecting right dominant side  No signs of acute stroke. Does have RUE weakness.   - continue home asa, eliquis, statin      Seizure disorder as sequela of cerebrovascular accident  Not currently on antiepileptics. No seizure activity reported. Monitor.       Controlled type 2 diabetes mellitus with chronic kidney disease on chronic dialysis, with long-term current use of insulin  A1c:   Lab Results   Component Value Date    HGBA1C 5.8  (H) 03/20/2024     Meds: SSI PRN to maintain goal 140-180  ADA renal diet, accuchecks, hypoglycemic protocol      Dyslipidemia  Continue statin      Benign essential HTN  Chronic. Latest blood pressure and vitals reviewed-     Temp:  [97 °F (36.1 °C)-100.3 °F (37.9 °C)]   Pulse:  [80-90]   Resp:  [16-20]   BP: ()/(44-79)   SpO2:  [91 %-95 %] .   Home meds for hypertension were reviewed and noted below.   Hypertension Medications               amLODIPine (NORVASC) 10 MG tablet Take 1 tablet by mouth once daily    labetaloL (NORMODYNE) 100 MG tablet Take 1 tablet (100 mg total) by mouth once daily.          - BP Rx held on admit due to hypotension  - BP improved. Resumed home Rx    Will utilize p.r.n. blood pressure medication only if patient's blood pressure greater than 180/110 and he develops symptoms such as worsening chest pain or shortness of breath.      VTE Risk Mitigation (From admission, onward)           Ordered     apixaban tablet 5 mg  2 times daily         03/20/24 1708                    Discharge Planning   GARY:      Code Status: Full Code   Is the patient medically ready for discharge?:     Reason for patient still in hospital (select all that apply): Patient trending condition and Treatment  Discharge Plan A: Skilled Nursing Facility   Discharge Delays: None known at this time              Barrie Negro MD  Department of Hospital Medicine   [facility] - Med Surg

## 2024-04-01 NOTE — PLAN OF CARE
Problem: Adult Inpatient Plan of Care  Goal: Plan of Care Review  Outcome: Ongoing, Progressing  Flowsheets (Taken 4/1/2024 1350)  Plan of Care Reviewed With: patient  Goal: Patient-Specific Goal (Individualized)  Outcome: Ongoing, Progressing  Goal: Absence of Hospital-Acquired Illness or Injury  Outcome: Ongoing, Progressing  Intervention: Identify and Manage Fall Risk  Flowsheets (Taken 4/1/2024 1350)  Safety Promotion/Fall Prevention:   assistive device/personal item within reach   high risk medications identified   Fall Risk reviewed with patient/family   medications reviewed   nonskid shoes/socks when out of bed   side rails raised x 2   room near unit station   instructed to call staff for mobility   bed alarm set  Intervention: Prevent Skin Injury  Flowsheets (Taken 4/1/2024 1350)  Body Position:   position changed independently   weight shifting  Skin Protection:   adhesive use limited   tubing/devices free from skin contact  Intervention: Prevent and Manage VTE (Venous Thromboembolism) Risk  Flowsheets (Taken 4/1/2024 1350)  Activity Management:   Arm raise - L1   Ankle pumps - L1   Straight leg raise - L1   Rolling - L1  VTE Prevention/Management:   ambulation promoted   bleeding precations maintained   bleeding risk assessed  Range of Motion: active ROM (range of motion) encouraged  Intervention: Prevent Infection  Flowsheets (Taken 4/1/2024 1350)  Infection Prevention: hand hygiene promoted  Goal: Optimal Comfort and Wellbeing  Outcome: Ongoing, Progressing  Goal: Readiness for Transition of Care  Outcome: Ongoing, Progressing     Problem: Infection  Goal: Absence of Infection Signs and Symptoms  Outcome: Ongoing, Progressing  Intervention: Prevent or Manage Infection  Flowsheets (Taken 4/1/2024 1350)  Infection Management: aseptic technique maintained     Problem: Diabetes Comorbidity  Goal: Blood Glucose Level Within Targeted Range  Outcome: Ongoing, Progressing  Intervention: Monitor and Manage  Glycemia  Flowsheets (Taken 4/1/2024 1350)  Glycemic Management: blood glucose monitored     Problem: Infection (Hemodialysis)  Goal: Absence of Infection Signs and Symptoms  Outcome: Ongoing, Progressing     Problem: Skin Injury Risk Increased  Goal: Skin Health and Integrity  Outcome: Ongoing, Progressing  Intervention: Optimize Skin Protection  Flowsheets (Taken 4/1/2024 1350)  Pressure Reduction Techniques:   weight shift assistance provided   frequent weight shift encouraged   pressure points protected  Pressure Reduction Devices: foam padding utilized  Skin Protection:   adhesive use limited   tubing/devices free from skin contact  Head of Bed (HOB) Positioning: HOB at 30-45 degrees     Problem: Impaired Wound Healing  Goal: Optimal Wound Healing  Outcome: Ongoing, Progressing  Intervention: Promote Wound Healing  Flowsheets (Taken 4/1/2024 1350)  Sleep/Rest Enhancement: relaxation techniques promoted  Activity Management:   Arm raise - L1   Ankle pumps - L1   Straight leg raise - L1   Rolling - L1  Pain Management Interventions: pain management plan reviewed with patient/caregiver     Problem: Fall Injury Risk  Goal: Absence of Fall and Fall-Related Injury  Outcome: Ongoing, Progressing  Intervention: Identify and Manage Contributors  Flowsheets (Taken 4/1/2024 1350)  Self-Care Promotion:   independence encouraged   BADL personal objects within reach   BADL personal routines maintained   meal set-up provided   safe use of adaptive equipment encouraged  Medication Review/Management:   medications reviewed   high-risk medications identified  Intervention: Promote Injury-Free Environment  Flowsheets (Taken 4/1/2024 1350)  Safety Promotion/Fall Prevention:   assistive device/personal item within reach   high risk medications identified   Fall Risk reviewed with patient/family   medications reviewed   nonskid shoes/socks when out of bed   side rails raised x 2   room near unit station   instructed to call staff  for mobility   bed alarm set   Pt alert able to make needs known,bella meds well,no s/s adverse reaction noted,reposition self q 2hrs,pain controlled by prn pain medication,POC explained,remains free from falls and pressure injuries,safety maintained,continue monitoring.

## 2024-04-01 NOTE — PT/OT/SLP PROGRESS
Physical Therapy      Patient Name:  Miguel Moore   MRN:  [id number]    Patient not seen today secondary to Dialysis. Will follow-up as able.

## 2024-04-01 NOTE — CLINICAL REVIEW
Sepsis Screen (most recent)       Sepsis Screen () - 04/01/24 2171       Is the patient's history or complaint suggestive of a possible infection? Yes  -    Are there at least two of the following signs and symptoms present? Yes  -    Sepsis signs/symptoms - Tachycardia Tachycardia     >90  -    Sepsis signs/symptoms - WBC WBC < 4,000 or WBC > 12,000  -    Are any of the following organ dysfunction criteria present and not considered to be due to a chronic condition? Yes   ESRD-HD -    Organ Dysfunction Criteria - O2 O2 Saturation < 95% on room air  -    Initiate Sepsis Protocol No   abx stopped -    Reason sepsis not considered Pt. receiving appropriate management   ID following -              User Key  (r) = Recorded By, (t) = Taken By, (c) = Cosigned By      Initials Name    Gita Peng RN

## 2024-04-01 NOTE — PT/OT/SLP PROGRESS
Occupational Therapy      Patient Name:  Miguel Moore   MRN:  [id number]    Patient not seen today secondary to Dialysis. Will follow-up as able.    4/1/2024

## 2024-04-01 NOTE — ASSESSMENT & PLAN NOTE
Patient has Paroxysmal (<7 days) atrial fibrillation which is uncontrolled. Patient is currently in atrial fibrillation.  - new onset Afib  - on labetalol for BP at home, eliquis for chronic DVT but did miss some doses of eliquis  - in ED given diltiazem gtt but that caused hypotension  - EKG with Afib RVR with normal Qtc  - started amiodarone with conversion to NSR. Changed to PO amiodarone  - however, he had recurrent Afib and was placed back on amio gtt.   - Cardiology consulted  - plan for SONY/DCCV on 3/22/24 but converted to sinus rhythm. Has been in/out of afib but rate controlled  - continue home eliquis    Lab Results   Component Value Date    TSH 2.672 03/20/2024     - TTE normal EF, mild LAE    [id number] - VASc score:  Congestive Heart Failure or EF < 35% NO   Hypertension YES  +1   Age >= 75 NO   Diabetes Mellitus YES  +1   Stroke/TIA prior history YES  +2   Vascular disease (PAD, MI or Aortic Plaque)? YES  +1   Age 65 - 74 YES  +1   Female NO   Total 6   Sinus at this time.

## 2024-04-01 NOTE — NURSING
Patient left unit via wheelchair/bed with 2 transporter for CT procedure.   No acute distress noted.

## 2024-04-01 NOTE — PROGRESS NOTES
[facility] - [facility] Surg  Infectious Disease  Progress Note    Patient Name: Miguel Moore  MRN: [id number]  Admission Date: 3/20/2024  Length of Stay: 12 days  Attending Physician: Barrie Negro MD  Primary Care Provider: Raciel Raymond MD    Isolation Status: Special Contact    Assessment/Plan:      Other  * Fever  I independently reviewed patient's lab work and images as documented. 69 yo male with ESRD on HD, DM and CVA with R sided weakness admitted for fatigue, hyperkalemia, found to have Afib with RVR. Admission course notable for R ankle cellulitis (completed 10d of abx therapy), CT with extensive subcutaneous edema, no focal fluid collection or OM seen. He spiked episodic fevers- blood cx obtained ngtd. TTE on 3/20 without IE. Mild swelling around ankle, pt reported interval improvement in pain and swelling since admission - no ongoing cellulitis. Dopplers negative. Unclear source of fevers, infectious work up so far negative/in process. Fever continues - patient stable and without complaints. Imaging ordered and is pending.    Recommendations  - repeat CT chest/abd/pelvis for further evaluation  - f/u blood cultures  - if not helpful in determining etiology of fever, consider NM imaging    Discussed with Dr. Marky Ramirez MD  Infectious Disease  [facility] - Med Surg    Subjective:     Principal Problem:Fever    HPI: 69 yo male with ESRD on HD, DM and CVA with R sided weakness admitted for fatigue, hyperkalemia, found to have Afib with RVR. ID consulted for fevers. Admission course notable for R ankle cellulitis, CT with extensive subcutaneous edema, no focal fluid collection or OM seen. He spiked a fever on 3/27 and 3/28 - blood cx obtained ngtd. TTE on 3/20 without IE. Pt is currently on vancomycin and meropenem. Prior to admission, pt reported he had pain in R ankle, denied fevers chills, sick contacts. He states that since he's been here, he has had some loose stools and RUE swelling.  Reported hives with PCN in ithe past, tolerated carb/cephs.   Interval History: Seen in HD. Fever on and off without specific complaints. Off abx.     Review of Systems   Constitutional:  Positive for fever. Negative for chills.   All other systems reviewed and are negative.    Objective:     Vital Signs (Most Recent):  Temp: 100 °F (37.8 °C) (04/01/24 0724)  Pulse: 81 (04/01/24 0735)  Resp: 20 (04/01/24 0724)  BP: 131/62 (04/01/24 0724)  SpO2: (!) 92 % (04/01/24 0724) Vital Signs (24h Range):  Temp:  [98.3 °F (36.8 °C)-100.3 °F (37.9 °C)] 100 °F (37.8 °C)  Pulse:  [80-90] 81  Resp:  [17-20] 20  SpO2:  [91 %-95 %] 92 %  BP: (120-140)/(55-70) 131/62     Weight: 83.5 kg (184 lb 1.4 oz)  Body mass index is 27.99 kg/m².    Estimated Creatinine Clearance: 7.1 mL/min (A) (based on SCr of 10.3 mg/dL (H)).     Physical Exam  Vitals and nursing note reviewed.   Constitutional:       Appearance: Normal appearance.   HENT:      Head: Normocephalic and atraumatic.      Right Ear: External ear normal.      Left Ear: External ear normal.      Mouth/Throat:      Comments: Poor dentition  Eyes:      Conjunctiva/sclera: Conjunctivae normal.      Pupils: Pupils are equal, round, and reactive to light.   Neurological:      General: No focal deficit present.      Mental Status: He is alert.   Psychiatric:         Mood and Affect: Mood normal.         Behavior: Behavior normal.         Thought Content: Thought content normal.         Judgment: Judgment normal.          Significant Labs: Blood Culture:   Recent Labs   Lab 03/23/24  1615 03/27/24  1719 03/27/24  1727 03/31/24  0940 03/31/24  0948   LABBLOO No Growth after 4 days. No Growth to date  No Growth to date  No Growth to date  No Growth to date No Growth after 4 days. No Growth to date No Growth to date     CBC:   Recent Labs   Lab 03/31/24  0417 04/01/24  0451   WBC 16.12* 17.48*   HGB 8.6* 8.2*   HCT 24.8* 23.6*    327     Microbiology Results (last 7 days)        Procedure Component Value Units Date/Time    Blood culture [[id number]] Collected: 03/27/24 1727    Order Status: Completed Specimen: Blood from Peripheral, Hand, Right Updated: 03/31/24 1903     Blood Culture, Routine No Growth after 4 days.    Blood culture [[id number]] Collected: 03/31/24 0940    Order Status: Completed Specimen: Blood from Peripheral, Forearm, Right Updated: 03/31/24 1712     Blood Culture, Routine No Growth to date    Blood culture [[id number]] Collected: 03/31/24 0948    Order Status: Completed Specimen: Blood from Peripheral, Hand, Right Updated: 03/31/24 1712     Blood Culture, Routine No Growth to date    Blood culture [[id number]] Collected: 03/27/24 1719    Order Status: Completed Specimen: Blood from Peripheral, Wrist, Right Updated: 03/31/24 1103     Blood Culture, Routine No Growth to date      No Growth to date      No Growth to date      No Growth to date    Clostridium difficile EIA [[id number]]     Order Status: Canceled Specimen: Stool     Blood culture [[id number]] Collected: 03/23/24 1614    Order Status: Completed Specimen: Blood from Peripheral, Hand, Left Updated: 03/27/24 1703     Blood Culture, Routine No Growth after 4 days.    Blood culture [[id number]] Collected: 03/23/24 1615    Order Status: Completed Specimen: Blood Updated: 03/27/24 1703     Blood Culture, Routine No Growth after 4 days.            Significant Imaging: I have reviewed all pertinent imaging results/findings within the past 24 hours.

## 2024-04-01 NOTE — ASSESSMENT & PLAN NOTE
Spiked fever on 3/27 despite being on antibiotics for 5 days for right ankle cellulitis. ID consulted. CT ankle with no obvious abscess. Possibly right arm from infiltration. Blood cultures negative to date. U/s of right forearm with no hematoma/abscess - no erythema/warmth.  - On meropenem/vancomycin  - ID following  - unclear etiology at this time  ABX's stopped.  IV's removed.  Repeat BCx.  CT chest/abd/pelvis

## 2024-04-01 NOTE — SUBJECTIVE & OBJECTIVE
Interval History: right arm swelling.      Review of Systems   HENT:  Negative for ear discharge and ear pain.    Eyes:  Negative for discharge and itching.   Endocrine: Negative for cold intolerance and heat intolerance.   Neurological:  Negative for seizures and syncope.     Objective:     Vital Signs (Most Recent):  Temp: 97 °F (36.1 °C) (04/01/24 0945)  Pulse: 80 (04/01/24 1315)  Resp: 16 (04/01/24 1315)  BP: (!) 99/52 (04/01/24 1315)  SpO2: (!) 92 % (04/01/24 0724) Vital Signs (24h Range):  Temp:  [97 °F (36.1 °C)-100.3 °F (37.9 °C)] 97 °F (36.1 °C)  Pulse:  [80-90] 80  Resp:  [16-20] 16  SpO2:  [91 %-95 %] 92 %  BP: ()/(44-79) 99/52     Weight: 83.5 kg (184 lb 1.4 oz)  Body mass index is 27.99 kg/m².    Intake/Output Summary (Last 24 hours) at 4/1/2024 1452  Last data filed at 4/1/2024 1315  Gross per 24 hour   Intake 240 ml   Output 2000 ml   Net -1760 ml           Physical Exam  Vitals and nursing note reviewed.   Constitutional:       General: He is not in acute distress.     Appearance: He is ill-appearing.   Cardiovascular:      Rate and Rhythm: Normal rate.      Pulses: Normal pulses.   Pulmonary:      Effort: Pulmonary effort is normal. No respiratory distress.      Breath sounds: No wheezing.   Abdominal:      General: Bowel sounds are normal.   Musculoskeletal:         General: Swelling present.      Comments: Lower extremities with swelling significantly improved. Right arm swelling but no induration, erythema or increased warmth.    Skin:     General: Skin is warm and dry.   Neurological:      Mental Status: He is alert. Mental status is at baseline.   Psychiatric:         Mood and Affect: Mood normal.         Thought Content: Thought content normal.             Significant Labs: All pertinent labs within the past 24 hours have been reviewed.  BMP:   Recent Labs   Lab 04/01/24  0451   *   *   K 4.3      CO2 19*   BUN 50*   CREATININE 10.3*   CALCIUM 7.5*       CBC:   Recent  Labs   Lab 03/31/24  0417 04/01/24  0451   WBC 16.12* 17.48*   HGB 8.6* 8.2*   HCT 24.8* 23.6*    327         Significant Imaging: I have reviewed all pertinent imaging results/findings within the past 24 hours.

## 2024-04-02 LAB
ALBUMIN SERPL BCP-MCNC: 1.4 G/DL (ref 3.5–5.2)
ALP SERPL-CCNC: 133 U/L (ref 55–135)
ALT SERPL W/O P-5'-P-CCNC: 17 U/L (ref 10–44)
ANION GAP SERPL CALC-SCNC: 10 MMOL/L (ref 8–16)
AST SERPL-CCNC: 27 U/L (ref 10–40)
BASOPHILS # BLD AUTO: 0.04 K/UL (ref 0–0.2)
BASOPHILS NFR BLD: 0.2 % (ref 0–1.9)
BILIRUB SERPL-MCNC: 1 MG/DL (ref 0.1–1)
BUN SERPL-MCNC: 32 MG/DL (ref 8–23)
CALCIUM SERPL-MCNC: 7.5 MG/DL (ref 8.7–10.5)
CHLORIDE SERPL-SCNC: 106 MMOL/L (ref 95–110)
CO2 SERPL-SCNC: 17 MMOL/L (ref 23–29)
CREAT SERPL-MCNC: 6.8 MG/DL (ref 0.5–1.4)
DIFFERENTIAL METHOD BLD: ABNORMAL
EOSINOPHIL # BLD AUTO: 0 K/UL (ref 0–0.5)
EOSINOPHIL NFR BLD: 0.2 % (ref 0–8)
ERYTHROCYTE [DISTWIDTH] IN BLOOD BY AUTOMATED COUNT: 21.4 % (ref 11.5–14.5)
EST. GFR  (NO RACE VARIABLE): 8 ML/MIN/1.73 M^2
GLUCOSE SERPL-MCNC: 146 MG/DL (ref 70–110)
HCT VFR BLD AUTO: 23.3 % (ref 40–54)
HGB BLD-MCNC: 7.8 G/DL (ref 14–18)
IMM GRANULOCYTES # BLD AUTO: 0.16 K/UL (ref 0–0.04)
IMM GRANULOCYTES NFR BLD AUTO: 1 % (ref 0–0.5)
LYMPHOCYTES # BLD AUTO: 1 K/UL (ref 1–4.8)
LYMPHOCYTES NFR BLD: 6.2 % (ref 18–48)
MCH RBC QN AUTO: 24.2 PG (ref 27–31)
MCHC RBC AUTO-ENTMCNC: 33.5 G/DL (ref 32–36)
MCV RBC AUTO: 72 FL (ref 82–98)
MONOCYTES # BLD AUTO: 1.2 K/UL (ref 0.3–1)
MONOCYTES NFR BLD: 6.9 % (ref 4–15)
NEUTROPHILS # BLD AUTO: 14.3 K/UL (ref 1.8–7.7)
NEUTROPHILS NFR BLD: 85.5 % (ref 38–73)
NRBC BLD-RTO: 0 /100 WBC
PLATELET # BLD AUTO: 317 K/UL (ref 150–450)
PMV BLD AUTO: 9.7 FL (ref 9.2–12.9)
POCT GLUCOSE: 173 MG/DL (ref 70–110)
POCT GLUCOSE: 188 MG/DL (ref 70–110)
POCT GLUCOSE: 207 MG/DL (ref 70–110)
POCT GLUCOSE: 208 MG/DL (ref 70–110)
POTASSIUM SERPL-SCNC: 4 MMOL/L (ref 3.5–5.1)
PROT SERPL-MCNC: 4.3 G/DL (ref 6–8.4)
RBC # BLD AUTO: 3.22 M/UL (ref 4.6–6.2)
SODIUM SERPL-SCNC: 133 MMOL/L (ref 136–145)
URATE SERPL-MCNC: 4.6 MG/DL (ref 3.4–7)
WBC # BLD AUTO: 16.7 K/UL (ref 3.9–12.7)

## 2024-04-02 PROCEDURE — 36415 COLL VENOUS BLD VENIPUNCTURE: CPT | Mod: HCNC | Performed by: STUDENT IN AN ORGANIZED HEALTH CARE EDUCATION/TRAINING PROGRAM

## 2024-04-02 PROCEDURE — 97530 THERAPEUTIC ACTIVITIES: CPT | Mod: HCNC,CQ

## 2024-04-02 PROCEDURE — 63600175 PHARM REV CODE 636 W HCPCS: Mod: HCNC | Performed by: STUDENT IN AN ORGANIZED HEALTH CARE EDUCATION/TRAINING PROGRAM

## 2024-04-02 PROCEDURE — 25000003 PHARM REV CODE 250: Mod: HCNC | Performed by: HOSPITALIST

## 2024-04-02 PROCEDURE — 97530 THERAPEUTIC ACTIVITIES: CPT | Mod: HCNC

## 2024-04-02 PROCEDURE — 85025 COMPLETE CBC W/AUTO DIFF WBC: CPT | Mod: HCNC | Performed by: STUDENT IN AN ORGANIZED HEALTH CARE EDUCATION/TRAINING PROGRAM

## 2024-04-02 PROCEDURE — 99223 1ST HOSP IP/OBS HIGH 75: CPT | Mod: HCNC,,,

## 2024-04-02 PROCEDURE — 84550 ASSAY OF BLOOD/URIC ACID: CPT | Mod: HCNC | Performed by: INTERNAL MEDICINE

## 2024-04-02 PROCEDURE — 80053 COMPREHEN METABOLIC PANEL: CPT | Mod: HCNC | Performed by: STUDENT IN AN ORGANIZED HEALTH CARE EDUCATION/TRAINING PROGRAM

## 2024-04-02 PROCEDURE — 97535 SELF CARE MNGMENT TRAINING: CPT | Mod: HCNC

## 2024-04-02 PROCEDURE — 21400001 HC TELEMETRY ROOM: Mod: HCNC

## 2024-04-02 PROCEDURE — 25000003 PHARM REV CODE 250: Mod: HCNC | Performed by: STUDENT IN AN ORGANIZED HEALTH CARE EDUCATION/TRAINING PROGRAM

## 2024-04-02 RX ADMIN — HYDROMORPHONE HYDROCHLORIDE 0.5 MG: 1 INJECTION, SOLUTION INTRAMUSCULAR; INTRAVENOUS; SUBCUTANEOUS at 11:04

## 2024-04-02 RX ADMIN — MIDODRINE HYDROCHLORIDE 10 MG: 5 TABLET ORAL at 08:04

## 2024-04-02 RX ADMIN — DIPHENOXYLATE HYDROCHLORIDE AND ATROPINE SULFATE 1 TABLET: 2.5; .025 TABLET ORAL at 11:04

## 2024-04-02 RX ADMIN — ATORVASTATIN CALCIUM 80 MG: 40 TABLET, FILM COATED ORAL at 08:04

## 2024-04-02 RX ADMIN — NEPHROCAP 1 CAPSULE: 1 CAP ORAL at 08:04

## 2024-04-02 RX ADMIN — INSULIN ASPART 2 UNITS: 100 INJECTION, SOLUTION INTRAVENOUS; SUBCUTANEOUS at 08:04

## 2024-04-02 RX ADMIN — TAMSULOSIN HYDROCHLORIDE 0.8 MG: 0.4 CAPSULE ORAL at 08:04

## 2024-04-02 RX ADMIN — APIXABAN 5 MG: 5 TABLET, FILM COATED ORAL at 08:04

## 2024-04-02 RX ADMIN — INSULIN ASPART 2 UNITS: 100 INJECTION, SOLUTION INTRAVENOUS; SUBCUTANEOUS at 12:04

## 2024-04-02 RX ADMIN — DIPHENOXYLATE HYDROCHLORIDE AND ATROPINE SULFATE 1 TABLET: 2.5; .025 TABLET ORAL at 05:04

## 2024-04-02 RX ADMIN — AMIODARONE HYDROCHLORIDE 400 MG: 200 TABLET ORAL at 08:04

## 2024-04-02 RX ADMIN — ACETAMINOPHEN 650 MG: 325 TABLET ORAL at 04:04

## 2024-04-02 RX ADMIN — HYDROMORPHONE HYDROCHLORIDE 0.5 MG: 1 INJECTION, SOLUTION INTRAMUSCULAR; INTRAVENOUS; SUBCUTANEOUS at 05:04

## 2024-04-02 RX ADMIN — FINASTERIDE 5 MG: 5 TABLET, FILM COATED ORAL at 08:04

## 2024-04-02 NOTE — PT/OT/SLP PROGRESS
Physical Therapy Treatment    Patient Name:  Miguel Moore   MRN:  [id number]    Recommendations:     Discharge Recommendations: Moderate Intensity Therapy  Discharge Equipment Recommendations: cane, quad  Barriers to discharge:  Decreased ambulation, decreased functional mobility, increased fall risk    Assessment:     Miguel Moore is a 68 y.o. male admitted with a medical diagnosis of Fever.  He presents with the following impairments/functional limitations: weakness, impaired endurance, impaired functional mobility, gait instability, impaired balance, decreased lower extremity function, decreased safety awareness, pain, abnormal tone, impaired coordination, impaired skin, edema, impaired joint extensibility, impaired muscle length.    Rehab Prognosis: Fair; patient would benefit from acute skilled PT services to address these deficits and reach maximum level of function.    Recent Surgery: Procedure(s) (LRB):  Transesophageal echo (SONY) intra-procedure log documentation (N/A)  Cardioversion (N/A) 11 Days Post-Op    Plan:     During this hospitalization, patient to be seen 5 x/week to address the identified rehab impairments via gait training, therapeutic activities, therapeutic exercises and progress toward the following goals:    Plan of Care Expires:  04/05/24    Subjective     Chief Complaint: ready to get back in bed 2* BM  Patient/Family Comments/goals: Pt agreed to get back in bed  Pain/Comfort:  Pain Rating 1:  (pt did not rate)  Location - Side 1: Right  Location 1: foot      Objective:     Communicated with Lizzy prior to session.  Patient found up in chair with peripheral IV, telemetry upon PT entry to room.     General Precautions: Standard, fall  Orthopedic Precautions: N/A  Braces: N/A  Respiratory Status: Room air     Functional Mobility:  Bed Mobility:     Scooting: minimum assistance and of 2 persons to scoot towards HOB  Sit to Supine: moderate assistance and of 2 persons for trunk control  and LE assistance  Transfers: Gait belt donned    Sit to Stand:  moderate assistance and of 2 persons with hand-held assist  Bed to Chair: moderate assistance, maximal assistance, and of 2 persons with  hand-held assist  using  Stand Pivot  Balance: Pt with poor standing balance      AM-PAC 6 CLICK MOBILITY  Turning over in bed (including adjusting bedclothes, sheets and blankets)?: 2  Sitting down on and standing up from a chair with arms (e.g., wheelchair, bedside commode, etc.): 2  Moving from lying on back to sitting on the side of the bed?: 2  Moving to and from a bed to a chair (including a wheelchair)?: 2  Need to walk in hospital room?: 2  Climbing 3-5 steps with a railing?: 1  Basic Mobility Total Score: 11       Treatment & Education:  Pt performed stand pivot transfer using HHA and Mod-Max A x2 persons    Patient left supine with all lines intact, call button in reach, Nursing notified, and family present and nursing present to complete isrrael-care 2* pt had BM in chair.    GOALS:   Multidisciplinary Problems       Physical Therapy Goals          Problem: Physical Therapy    Goal Priority Disciplines Outcome Goal Variances Interventions   Physical Therapy Goal     PT, PT/OT Ongoing, Progressing     Description: Goals to be met by:  2024    Patient will increase functional independence with mobility by performin. Supine to sit with Modified [place]  2. Sit to supine with Modified [place]  3. Sit to stand transfer with Modified [place]  4. Gait  x 100 feet with Modified [place] using Quad Cane.    Recommend: Moderate Intensity Therapy at time of discharge.                             Time Tracking:     PT Received On: 24  PT Start Time: 1328     PT Stop Time: 1337  PT Total Time (min): 9 min     Billable Minutes: Therapeutic Activity 9    Treatment Type: Treatment  PT/PTA: PTA     Number of PTA visits since last PT visit: 2024

## 2024-04-02 NOTE — PROGRESS NOTES
[facility] Medicine  Progress Note    Patient Name: Miguel Moore  MRN: [id number]  Patient Class: IP- Inpatient   Admission Date: 3/20/2024  Length of Stay: 13 days  Attending Physician: Barrie Negro MD  Primary Care Provider: Raciel Raymond MD        Subjective:     Principal Problem:Fever        HPI:  Mr Miguel Moore is a 68 y.o. man with ESRD on HD, HTN, DM with neuropathy, history of CVA with residual R sided weakness, chronic DVT on eliquis who presented with feeling poorly. He attended his usual dialysis session on Monday 3/18 without issues. He developed fatigue and R ankle swelling. He has some pain with palpation but is able to walk. No reports of trauma. No wounds. No falls. No history of gout. Today he was feeling worse so did not go to dialysis and came to the ED. No fevers. No shortness of breath. Normal appetite. No nausea, vomiting. No chest pain. No palpitations.     In the ED, afebrile with HR initially controlled then to 130s Afib RVR. Started diltiazem but became hypotensive. Diltiazem stopped. Given antibiotics. Admitted for further management.     Overview/Hospital Course:  Mr Miguel Moore is a 68 y.o. man with ESRD on HD, DM who was admitted with new onset Afib RVR, hyperkalemia from missed HD session, and R ankle cellulitis. Started amio gtt with conversion to NSR. TTE EF 55-60%, mild LAE. Cardiology consulted. Now on PO amiodarone and continues home eliquis (on this for chronic DVT). He received HD with resolution of hyperkalemia. He is on CTX for his R ankle cellulitis and was given colchicine in case gout could contribute. Also added vanc,foot swelling is improving.This is improving. He developed recurrent Afib and amio gtt resumed. Cardiology was planning SONY/DCCV on 3/22. But converted to sinus. Transferred to floor. Right ankle cellulitis appears to be improving. Started spiking fevers on 3/27 up to 102.7 F. Blood cultures, u/a ordered. Vanc/Ceftriaxone  changed to Meropenem. CT right ankle with no obvious acute infection, pain is improving. Right forearm with swelling, u/s negative for abscess or hematoma. Blood cultures negative. ID following. Unclear etiology of fevers.       Interval History: still having fevers.    Review of Systems   HENT:  Negative for ear discharge and ear pain.    Eyes:  Negative for discharge and itching.   Endocrine: Negative for cold intolerance and heat intolerance.   Neurological:  Negative for seizures and syncope.     Objective:     Vital Signs (Most Recent):  Temp: (!) 101.4 °F (38.6 °C) (04/02/24 1636)  Pulse: 84 (04/02/24 1600)  Resp: 20 (04/02/24 1600)  BP: (!) 111/53 (04/02/24 1600)  SpO2: (!) 92 % (04/02/24 1600) Vital Signs (24h Range):  Temp:  [99.1 °F (37.3 °C)-101.9 °F (38.8 °C)] 101.4 °F (38.6 °C)  Pulse:  [73-90] 84  Resp:  [18-20] 20  SpO2:  [90 %-94 %] 92 %  BP: (111-127)/(53-59) 111/53     Weight: 90.4 kg (199 lb 4.7 oz)  Body mass index is 30.3 kg/m².    Intake/Output Summary (Last 24 hours) at 4/2/2024 1647  Last data filed at 4/2/2024 1327  Gross per 24 hour   Intake 960 ml   Output --   Net 960 ml           Physical Exam  Vitals and nursing note reviewed.   Constitutional:       General: He is not in acute distress.     Appearance: He is ill-appearing.   Cardiovascular:      Rate and Rhythm: Normal rate.      Pulses: Normal pulses.   Pulmonary:      Effort: Pulmonary effort is normal. No respiratory distress.      Breath sounds: No wheezing.   Abdominal:      General: Bowel sounds are normal.   Musculoskeletal:         General: Swelling present.      Comments: Lower extremities with swelling significantly improved. Right arm swelling but no induration, erythema or increased warmth.    Skin:     General: Skin is warm and dry.   Neurological:      Mental Status: He is alert. Mental status is at baseline.   Psychiatric:         Mood and Affect: Mood normal.         Thought Content: Thought content normal.              Significant Labs: All pertinent labs within the past 24 hours have been reviewed.  BMP:   Recent Labs   Lab 04/02/24  0340   *   *   K 4.0      CO2 17*   BUN 32*   CREATININE 6.8*   CALCIUM 7.5*       CBC:   Recent Labs   Lab 04/01/24  0451 04/02/24  0340   WBC 17.48* 16.70*   HGB 8.2* 7.8*   HCT 23.6* 23.3*    317         Significant Imaging: I have reviewed all pertinent imaging results/findings within the past 24 hours.    Assessment/Plan:      * Fever  Spiked fever on 3/27 despite being on antibiotics for 5 days for right ankle cellulitis. ID consulted. CT ankle with no obvious abscess. Possibly right arm from infiltration. Blood cultures negative to date. U/s of right forearm with no hematoma/abscess - no erythema/warmth.  - On meropenem/vancomycin  - ID following  - unclear etiology at this time  ABX's stopped.  IV's removed.  Repeat BCx.  CT chest/abd/pelvis with no obvious source of infection.      Open wound        Cellulitis of right foot  R ankle pain, swelling, warmth present. Potential gout vs cellulitis. 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 No fracture present. Uric acid normal. Arterial US and venous US with no clots, appropriate flow  - on ceftriaxone/vanc for 7 days and still with pain and now with increased WBC and fevers  - blood cultures currently NGTD  - CT right ankle with no evidence of osteo.  - I do not believe this is source of recurrent fevers at this time  ABX's stopped.      Paroxysmal atrial fibrillation with RVR  Patient has Paroxysmal (<7 days) atrial fibrillation which is uncontrolled. Patient is currently in atrial fibrillation.  - new onset Afib  - on labetalol for BP at home, eliquis for chronic DVT but did miss some doses of eliquis  - in ED given diltiazem gtt but that caused hypotension  - EKG with Afib RVR with normal Qtc  - started amiodarone with conversion to NSR. Changed to PO amiodarone  - however, he had  recurrent Afib and was placed back on amio gtt.   - Cardiology consulted  - plan for SONY/DCCV on 3/22/24 but converted to sinus rhythm. Has been in/out of afib but rate controlled  - continue home eliquis    Lab Results   Component Value Date    TSH 2.672 03/20/2024     - TTE normal EF, mild LAE    [id number] - VASc score:  Congestive Heart Failure or EF < 35% NO   Hypertension YES  +1   Age >= 75 NO   Diabetes Mellitus YES  +1   Stroke/TIA prior history YES  +2   Vascular disease (PAD, MI or Aortic Plaque)? YES  +1   Age 65 - 74 YES  +1   Female NO   Total 6   Sinus at this time.      Anemia in chronic kidney disease  Patient's anemia is currently controlled. Has not received any PRBCs to date. Etiology likely d/t chronic disease due to ESRD  Current CBC reviewed-   Lab Results   Component Value Date    HGB 8.2 (L) 04/01/2024    HCT 23.6 (L) 04/01/2024     Monitor serial CBC and transfuse if patient becomes hemodynamically unstable, symptomatic or H/H drops below 7/21.    Chronic deep vein thrombosis (DVT) of popliteal vein of right lower extremity 9/21/20 outside US; unprovoked; anticoagulation        History of stroke  pT,OT.      Secondary hyperparathyroidism of renal origin  Continue renvela       ESRD (end stage renal disease)  ESRD via LUE AVF. Last session 3/18/24. Missed 3/20/24 due to fatigue. Usually MWF  - hyperkalemic on admit. Does not appear volume overloaded.   - Nephrology Dr Currie consulted. Received HD on 3/20. K normalized.   - continue MWF HD    Hemiplegia and hemiparesis following cerebral infarction affecting right dominant side  No signs of acute stroke. Does have RUE weakness.   - continue home asa, eliquis, statin      Seizure disorder as sequela of cerebrovascular accident  Not currently on antiepileptics. No seizure activity reported. Monitor.       Controlled type 2 diabetes mellitus with chronic kidney disease on chronic dialysis, with long-term current use of insulin  A1c:   Lab Results    Component Value Date    HGBA1C 5.8 (H) 03/20/2024     Meds: SSI PRN to maintain goal 140-180  ADA renal diet, accuchecks, hypoglycemic protocol      Dyslipidemia  Continue statin      Benign essential HTN  Chronic. Latest blood pressure and vitals reviewed-     Temp:  [97 °F (36.1 °C)-100.3 °F (37.9 °C)]   Pulse:  [80-90]   Resp:  [16-20]   BP: ()/(44-79)   SpO2:  [91 %-95 %] .   Home meds for hypertension were reviewed and noted below.   Hypertension Medications               amLODIPine (NORVASC) 10 MG tablet Take 1 tablet by mouth once daily    labetaloL (NORMODYNE) 100 MG tablet Take 1 tablet (100 mg total) by mouth once daily.          - BP Rx held on admit due to hypotension  - BP improved. Resumed home Rx    Will utilize p.r.n. blood pressure medication only if patient's blood pressure greater than 180/110 and he develops symptoms such as worsening chest pain or shortness of breath.      VTE Risk Mitigation (From admission, onward)           Ordered     apixaban tablet 5 mg  2 times daily         03/20/24 1708                    Discharge Planning   GARY:      Code Status: Full Code   Is the patient medically ready for discharge?:     Reason for patient still in hospital (select all that apply): Patient trending condition  Discharge Plan A: Home Health   Discharge Delays: None known at this time              Barrie Negro MD  Department of Hospital Medicine   [facility]

## 2024-04-02 NOTE — ASSESSMENT & PLAN NOTE
Spiked fever on 3/27 despite being on antibiotics for 5 days for right ankle cellulitis. ID consulted. CT ankle with no obvious abscess. Possibly right arm from infiltration. Blood cultures negative to date. U/s of right forearm with no hematoma/abscess - no erythema/warmth.  - On meropenem/vancomycin  - ID following  - unclear etiology at this time  ABX's stopped.  IV's removed.  Repeat BCx.  CT chest/abd/pelvis with no obvious source of infection.

## 2024-04-02 NOTE — PLAN OF CARE
Problem: Adult Inpatient Plan of Care  Goal: Plan of Care Review  Outcome: Ongoing, Progressing  Goal: Patient-Specific Goal (Individualized)  Outcome: Ongoing, Progressing  Goal: Absence of Hospital-Acquired Illness or Injury  Outcome: Ongoing, Progressing  Goal: Optimal Comfort and Wellbeing  Outcome: Ongoing, Progressing  Goal: Readiness for Transition of Care  Outcome: Ongoing, Progressing     Problem: Infection  Goal: Absence of Infection Signs and Symptoms  Outcome: Ongoing, Progressing     Problem: Diabetes Comorbidity  Goal: Blood Glucose Level Within Targeted Range  Outcome: Ongoing, Progressing     Problem: Skin Injury Risk Increased  Goal: Skin Health and Integrity  Outcome: Ongoing, Progressing     Problem: Impaired Wound Healing  Goal: Optimal Wound Healing  Outcome: Ongoing, Progressing     Problem: Fall Injury Risk  Goal: Absence of Fall and Fall-Related Injury  Outcome: Ongoing, Progressing   Pt A&Ox4, free from falls/injury, and able to make needs known during shift. VSS. Pt had c/o pain during shift. PRN pain medication given with relief. Telemetry monitoring continued on box #0382, visible and audible on monitor at nurses' station, no acute distress noted. Bed locked and in lowest position. Bedside table and call light in reach. Will cont to monitor.

## 2024-04-02 NOTE — CONSULTS
[facility] S[facility]  Wound Care  [facility]    Patient Name:  Miguel Moore   MRN:  [id number]  Date: 4/2/2024  Diagnosis: Fever    History:     Past Medical History:   Diagnosis Date    Allergy     Clotting disorder     Elaquis and APlavix    Deep vein thrombosis     Diabetes mellitus, type 2     Hypertension     Nuclear sclerosis of both eyes 6/9/2017    Renal disorder     Seizures     Stroke     Urinary tract infection        Social History     Socioeconomic History    Marital status: Single   Occupational History    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, etc   Tobacco Use    Smoking status: Never    Smokeless tobacco: Never   Substance and Sexual Activity    Alcohol use: No    Drug use: No   Social History Narrative    Lives with daughter       Precautions:     Allergies as of 03/20/2024 - Reviewed 03/20/2024   Allergen Reaction Noted    Ace inhibitors  09/03/2018    Penicillins Hives 01/13/2015    Tizanidine  06/30/2020       [facility] Assessment Details/Treatment     Active Problem List with Overview Notes    Diagnosis Date Noted    Fever 03/29/2024    Open wound 03/26/2024    Paroxysmal atrial fibrillation with RVR 03/20/2024    Cellulitis of right foot 03/20/2024    Bilateral posterior capsular opacification 05/02/2023    Pseudophakia 01/06/2022    History of diabetic ulcer of foot     Elevated PSA 2/18/21 prostate bx normal 02/18/2021 2/18/21 prostate bx normal  1/25/24 MRI prostate PIRADS 2      Pulmonary nodule 1/2021 4 mm nodule. 01/06/2021 1/6/2021 CT abd/pelvis: 4 mm nodule in the right middle lobe      Chronic deep vein thrombosis (DVT) of popliteal vein of right lower extremity 9/21/20 outside US; unprovoked; anticoagulation 09/21/2020    History of fungal infection candida parasilosis hospitalization 8/2020 08/29/2020     -Found to have candidemia - source unclear  -infectious disease consulted, Started on micafungin and now converted to fluconazole  -Repeat cultures negative so  far  -Dialysis line removed 8/28.   line replaced on 08/31 after line holiday.  -end date of fluconazole will be 09/11/2020.  Patient has ophthalmology follow-up scheduled on 09/08/2020. Tentative end date 9/11/2020 If no endophthalmitis. If noted to have endophthalmitis during optho visit, would need at least 4-6 weeks of treatment      Anemia in chronic kidney disease 08/26/2020    Nephrotic syndrome 08/16/2020    Type 2 diabetes mellitus with diabetic neuropathy, with long-term current use of insulin 05/10/2018    History of stroke 12/04/2017    CME (cystoid macular edema), bilateral 11/16/2017    Refractive error 11/16/2017    Senile nuclear sclerosis 10/05/2017    Right sided weakness 09/11/2017    Secondary hyperparathyroidism of renal origin 08/03/2017    Vitamin D deficiency 08/03/2017    Nuclear sclerosis, left 06/09/2017    Cortical cataract of both eyes 06/09/2017    DM type 2 without retinopathy 06/09/2017    Microcytosis 9/2019 labs c/w AoCD and AoCKD 03/22/2017     Seen on admission as well 2015; normal Hb, low MCV.  Normal RDW  08/17/2020 EGD normal esophagus.  Discolored nodular and texture change mucosa in the antrum.  Normal duodenum.  Path with foveolar hyperplasia and early xanthoma formation.  H pylori negative.  Mild chronic inflammation without acute activity      ESRD (end stage renal disease) 03/22/2017 2/27/20 renal US with dopplers no ALF.  Medical renal disease  8/2020 renal US: 1. Symmetric diffusely increased cortical echogenicity and elevated resistive indices, nonspecific indicators of chronic medical renal disease.  2. Prostatomegaly.  Some of the provided images are suggestive of a distinct hyperechoic component of the prostate gland, of uncertain etiology or significance, and potentially artifactual.  Dedicated prostate ultrasound or MRI may be of benefit for further characterization.    Started on HD while hospitalized for progression to ESRD 8/2020  -Continue dialysis per  nephrology  -Outpatient HD has been arranged  -Had planned discharge 8/27 if remained afebrile and cultures negative.  Unfortunately his blood culture grew Candida parapsilosis  -Dr. Gomez with ID consulted and case discussed with him.  Started micafungin 8/27 and now on fluconazole.  -Dialysis line removed after HD 8/28 and plan line holiday over weekend.  -new line by IR on 8/31, tolerated dialysis fluid.  -continue outpatient dialysis.      Benign essential HTN 03/21/2017 01/06/2021 TTE mild left atrial enlargement.  LV normal size and systolic function LVEF 55%.  Indeterminate diastolic function.  Mild right atrial enlargement.  Normal RV systolic function.  Normal RV size.  PA pressure 20.  Normal CVP.  Three.      Dyslipidemia 03/21/2017    Controlled type 2 diabetes mellitus with chronic kidney disease on chronic dialysis, with long-term current use of insulin 03/21/2017    BPH (benign prostatic hyperplasia) 2/18/21 prostate bx normal 03/21/2017 6/26/2017 renal US mod prostatomegaly.      Seizure disorder as sequela of cerebrovascular accident 03/21/2017    Hemiplegia and hemiparesis following cerebral infarction affecting right dominant side 03/21/2017      Consulted for wounds right ankle  3/26 Nursing consult by [facility] nurse for altered skin integrity left leg and thigh and treatment plan developed for wounds on right upper thigh and right anterior lower leg  4/2 WBC 16.7 Hgb 7.8 Hct 23.3   Assessment:  Photodocumentation    Right anterior lower leg- Edematous, pin sized opening with purulent drainage. Fluctuance anterior lower leg extending to lateral lower leg. Painful to touch. Eschar 1 cm adjacent to draining area. Epidermis peeling around opening and lateral ankle.   Mepilex dressing removed saturated with creamy drainage.  Treatment/Plan:  Local wound care to draining area on right lower leg- Cleansed with Vashe. Applied 3M Cavilon No Sting Film Barrier to periwound skin. Covered with Mepilex  dressing and replaced EHOB boot.  MD may want to consider consult to evaluate area of drainage and fluctuance.   Recommendations made to primary team. Orders placed.     04/02/2024

## 2024-04-02 NOTE — NURSING
Pt had an incontinence bowel movement, RN removed mepilex foam dressing to sacrum and it peeled skin from blister. Replaced with mepilex foam dressing. Instructed pt to call for bed pan to prevent soiling dressing and wound being contaminated.

## 2024-04-02 NOTE — NURSING
Pt lying in bed, reports pain to right leg with activity. Dressing to right groin and ankle is clean and intact, noted drainage. Offloading heel boots present. Right arm swelling and contracture noted. Left arm limb alert present, AV fistula with bruit and thrill. Bed alarm set, call light in reach, instructed pt to call for assistance.       [facility]  Nurses Note -- 4 Eyes      4/1/2024       Skin assessed on: Q Shift      [] No Pressure Injuries Present    []Prevention Measures Documented    [] Yes LDA  for Pressure Injury Previously documented     [x] Yes New Pressure Injury Discovered   [x] LDA for New Pressure Injury Added    Left heel    Attending RN:  Tayla Tong RN     Second eyes: JIM Mcdonald

## 2024-04-02 NOTE — SUBJECTIVE & OBJECTIVE
Interval History: still having fevers.    Review of Systems   HENT:  Negative for ear discharge and ear pain.    Eyes:  Negative for discharge and itching.   Endocrine: Negative for cold intolerance and heat intolerance.   Neurological:  Negative for seizures and syncope.     Objective:     Vital Signs (Most Recent):  Temp: (!) 101.4 °F (38.6 °C) (04/02/24 1636)  Pulse: 84 (04/02/24 1600)  Resp: 20 (04/02/24 1600)  BP: (!) 111/53 (04/02/24 1600)  SpO2: (!) 92 % (04/02/24 1600) Vital Signs (24h Range):  Temp:  [99.1 °F (37.3 °C)-101.9 °F (38.8 °C)] 101.4 °F (38.6 °C)  Pulse:  [73-90] 84  Resp:  [18-20] 20  SpO2:  [90 %-94 %] 92 %  BP: (111-127)/(53-59) 111/53     Weight: 90.4 kg (199 lb 4.7 oz)  Body mass index is 30.3 kg/m².    Intake/Output Summary (Last 24 hours) at 4/2/2024 1647  Last data filed at 4/2/2024 1327  Gross per 24 hour   Intake 960 ml   Output --   Net 960 ml           Physical Exam  Vitals and nursing note reviewed.   Constitutional:       General: He is not in acute distress.     Appearance: He is ill-appearing.   Cardiovascular:      Rate and Rhythm: Normal rate.      Pulses: Normal pulses.   Pulmonary:      Effort: Pulmonary effort is normal. No respiratory distress.      Breath sounds: No wheezing.   Abdominal:      General: Bowel sounds are normal.   Musculoskeletal:         General: Swelling present.      Comments: Lower extremities with swelling significantly improved. Right arm swelling but no induration, erythema or increased warmth.    Skin:     General: Skin is warm and dry.   Neurological:      Mental Status: He is alert. Mental status is at baseline.   Psychiatric:         Mood and Affect: Mood normal.         Thought Content: Thought content normal.             Significant Labs: All pertinent labs within the past 24 hours have been reviewed.  BMP:   Recent Labs   Lab 04/02/24  0340   *   *   K 4.0      CO2 17*   BUN 32*   CREATININE 6.8*   CALCIUM 7.5*       CBC:    Recent Labs   Lab 04/01/24  0451 04/02/24  0340   WBC 17.48* 16.70*   HGB 8.2* 7.8*   HCT 23.6* 23.3*    317         Significant Imaging: I have reviewed all pertinent imaging results/findings within the past 24 hours.

## 2024-04-02 NOTE — NURSING
[facility] [place]es [place] 4 Eyes      4/1/2024       Skin assessed on: Q Shift      [x] No Pressure Injuries Present    []Prevention Measures Documented    [] Yes LDA  for Pressure Injury Previously documented     [] Yes New Pressure Injury Discovered   [] LDA for New Pressure Injury Added      Attending RN:  Tamara Dominguez LPN     Second RN:  JIM Dominguez

## 2024-04-02 NOTE — PT/OT/SLP PROGRESS
"Occupational Therapy   Treatment    Name: Miguel Moore  MRN: [id number]  Admitting Diagnosis:  Fever  11 Days Post-Op    Recommendations:     Discharge Recommendations: Moderate Intensity Therapy  Discharge Equipment Recommendations:  to be determined by next level of care  Barriers to discharge:   (Pt is at high risk of falls, readmission and morbidity if d/c home at this time.)    Assessment:     Miguel Moore is a 68 y.o. male with a medical diagnosis of Fever.   Performance deficits affecting function are weakness, impaired endurance, impaired self care skills, impaired functional mobility, gait instability, impaired balance, decreased upper extremity function, decreased coordination, decreased lower extremity function, decreased safety awareness, pain, decreased ROM, edema, impaired skin.     Pt w/ general weakness, requiring increased level of assistance for functional mobility and transfers. Pt w/ complaints of buttock and R ankle pain throughout, requiring max A for stand pivot transfer to chair. Pt will continue to benefit from skilled acute OT services to maximize functional capacity for safe performance w/ ADLs and functional mobility.     Rehab Prognosis:  Fair+; patient would benefit from acute skilled OT services to address these deficits and reach maximum level of function.       Plan:     Patient to be seen  (3-5x/wk) to address the above listed problems via self-care/home management, therapeutic activities, therapeutic exercises  Plan of Care Expires: 04/05/24  Plan of Care Reviewed with: patient    Subjective     Chief Complaint: None stated   Patient/Family Comments/goals: "I am willing to try."   Pain/Comfort:  Pain Rating 1:  (Pt did not rate.)  Location - Side 1: Right  Location 1: ankle  Pain Addressed 1: Reposition, Distraction, Cessation of Activity    Objective:     Communicated with: Nurse prior to session.  Patient found HOB elevated with pressure relief boots, telemetry (AV fistula) " upon OT entry to room.    General Precautions: Standard, fall    Orthopedic Precautions:N/A  Braces: N/A  Respiratory Status: Room air     Occupational Performance:     Bed Mobility:  While sitting EOB, pt w/ lateral lean, requiring mod-max A for correction to maintain static sitting balance.   Patient completed Rolling/Turning to Left with  minimum assistance  Patient completed Rolling/Turning to Right with moderate assistance and use of bed rail   Patient completed Scooting hips to EOB with moderate assistance and maximal assistance  Patient completed Supine to Sit with moderate assistance and maximal assistance     Functional Mobility/Transfers:  Patient completed Sit <> Stand Transfer x 2 trials from EOB with maximal assistance  with  hand-held assist  Functional Mobility: Unable to perform.     Activities of Daily Living:  Upper Body Dressing: moderate assistance and maximal assistance for donning gown   Lower Body Dressing: dependence for donning new brief at bed level   Toileting: dependence for isrrael-care at bed level     [facility] 6 Click ADL: 14    Treatment & Education:  -Pt educated on role of OT and POC.   -Pt educated on importance of OOB activity w/ staff.   -Pt educated on negative effects of prolonged bed rest and was encouraged to sit up in chair at least 3 hours per day.   -Pt encouraged to weight shift while up in chair to prevent pressure injury and promote blood flow.     Patient left up in chair with all lines intact, call button in reach, nurse notified, and BLE pressure relief boots in place.     GOALS:   Multidisciplinary Problems       Occupational Therapy Goals          Problem: Occupational Therapy    Goal Priority Disciplines Outcome Interventions   Occupational Therapy Goal     OT, PT/OT Ongoing, Progressing    Description: Goals to be met by: 4/5/24     Patient will increase functional independence with ADLs by performing:    LE Dressing with Supervision.  Grooming while standing at sink  w/ seated with Supervision.  Toileting from bedside commode with Supervision for hygiene and clothing management.   Rolling to Bilateral with Supervision.   Supine to sit with Supervision.  Step transfer with Supervision  Toilet transfer to bedside commode with Supervision.  Upper extremity exercise program x15 reps per handout, with independence.                         Time Tracking:     OT Date of Treatment: 04/02/24  OT Start Time: 0933  OT Stop Time: 1003  OT Total Time (min): 30 min    Billable Minutes:Self Care/Home Management 15  Therapeutic Activity 15  Total Time 30 (co-tx w/ PT)     OT/GENEVA: OT          PM Session: 9005-5302 (9 min/1 TA) -- Pt required mod A x 2 persons for completeing sit>stand from chair to complete step/stand pivot transfer back to bed. Pt required mod-max A x 2 via HHA for safety. Mod A x 2 for sit>supine, min A x 2 for scooting to HOB in supine w/ use fo LUE/LLE. Pt left w/ nursing/PCT to complete isrrael-care as pt had a BM accident in chair.     4/2/2024

## 2024-04-02 NOTE — NURSING
[facility], [facility] [place]  Nurses Note -- 4 Eyes      4/2/2024       Skin assessed on: Q Shift      [] No Pressure Injuries Present    []Prevention Measures Documented    [x] Yes LDA  for Pressure Injury Previously documented     [] Yes New Pressure Injury Discovered   [] LDA for New Pressure Injury Added      Attending RN:  Lizzy Mauricio RN     Second RN:  DARRION Bourne

## 2024-04-02 NOTE — PROGRESS NOTES
"                        Renal Progress Note    Date of Admission:  3/20/2024 10:11 AM    Length of Stay: 13  Days    Subjective: n/a    Objective:    Current Facility-Administered Medications   Medication    acetaminophen tablet 650 mg    amiodarone tablet 400 mg    apixaban tablet 5 mg    atorvastatin tablet 80 mg    dextrose 10% bolus 125 mL 125 mL    dextrose 10% bolus 250 mL 250 mL    diphenoxylate-atropine 2.5-0.025 mg per tablet 1 tablet    epoetin marisol-epbx injection 5,000 Units    finasteride tablet 5 mg    glucagon (human recombinant) injection 1 mg    glucose chewable tablet 16 g    glucose chewable tablet 24 g    HYDROmorphone injection 0.5 mg    insulin aspart U-100 pen 0-5 Units    melatonin tablet 6 mg    midodrine tablet 10 mg    naloxone 0.4 mg/mL injection 0.02 mg    ondansetron injection 4 mg    prochlorperazine injection Soln 5 mg    sodium chloride 0.9% bolus 250 mL 250 mL    tamsulosin 24 hr capsule 0.8 mg    vitamin renal formula (B-complex-vitamin c-folic acid) 1 mg per capsule 1 capsule       Vitals:    04/02/24 0608 04/02/24 0718 04/02/24 0726 04/02/24 1101   BP:  (!) 121/58     BP Location:  Left arm     Patient Position:  Lying     Pulse:  79 84    Resp:  20  19   Temp:  99.6 °F (37.6 °C)     TempSrc:  Oral     SpO2:  (!) 92%     Weight: 90.4 kg (199 lb 4.7 oz)      Height:           I/O last 3 completed shifts:  In: 720 [P.O.:720]  Out: 2000 [Other:2000]  I/O this shift:  In: 240 [P.O.:240]  Out: -       Physical Exam:       Laboratories:    Recent Labs   Lab 04/02/24  0340   WBC 16.70*   RBC 3.22*   HGB 7.8*   HCT 23.3*      MCV 72*   MCH 24.2*   MCHC 33.5       Recent Labs   Lab 04/02/24  0340   CALCIUM 7.5*   ALBUMIN 1.4*   PROT 4.3*   *   K 4.0   CO2 17*      BUN 32*   CREATININE 6.8*   ALKPHOS 133   ALT 17   AST 27   BILITOT 1.0       No results for input(s): "COLORU", "CLARITYU", "SPECGRAV", "PHUR", "PROTEINUA", "GLUCOSEU", "BILIRUBINCON", "BLOODU", "WBCU", " ""RBCU", "BACTERIA", "MUCUS" in the last 24 hours.    Microbiology Results (last 7 days)       Procedure Component Value Units Date/Time    Blood culture [[id number]] Collected: 03/31/24 0940    Order Status: Completed Specimen: Blood from Peripheral, Forearm, Right Updated: 04/02/24 1103     Blood Culture, Routine No Growth to date      No Growth to date      No Growth to date    Blood culture [[id number]] Collected: 03/31/24 0948    Order Status: Completed Specimen: Blood from Peripheral, Hand, Right Updated: 04/02/24 1103     Blood Culture, Routine No Growth to date      No Growth to date      No Growth to date    Blood culture [[id number]] Collected: 03/27/24 1719    Order Status: Completed Specimen: Blood from Peripheral, Wrist, Right Updated: 04/01/24 1103     Blood Culture, Routine No Growth after 4 days.    Blood culture [[id number]] Collected: 03/27/24 1727    Order Status: Completed Specimen: Blood from Peripheral, Hand, Right Updated: 03/31/24 1903     Blood Culture, Routine No Growth after 4 days.    Clostridium difficile EIA [[id number]]     Order Status: Canceled Specimen: Stool     Blood culture [[id number]] Collected: 03/23/24 1614    Order Status: Completed Specimen: Blood from Peripheral, Hand, Left Updated: 03/27/24 1703     Blood Culture, Routine No Growth after 4 days.    Blood culture [[id number]] Collected: 03/23/24 1615    Order Status: Completed Specimen: Blood Updated: 03/27/24 1703     Blood Culture, Routine No Growth after 4 days.              Diagnostic Tests: n/a        Assessment:    69 y/o male with ESRD on HD admitted with:      - Paroxysmal atrial fibrillation with RVR   - Hyperkalemia RESOLVED  - R-ankle cellulitis / phlebitis  - DM-2 w/renal manifestations  - HTN  - Anemia of ckd  - 2nd. Hyperparathyroidism  - Hypoalbuminemia  - Hx. Of prior CVA with R-hemiplegia  - Seizure disorder due to above  - HLD      Plan:    - Antibiotics per ID  - Dialysis Q MWF  - UF as tolerated  - Stop " Midodrine and watch BPs  - Epogen as needed  - Renal ADA diet + protein supplements  - Amiodarone and Eliquis for PAF as per Cardiology  - Glycemic control and other problems per admitting      Will follow on Dialysis days.

## 2024-04-03 LAB
ALBUMIN SERPL BCP-MCNC: 1.5 G/DL (ref 3.5–5.2)
ALP SERPL-CCNC: 128 U/L (ref 55–135)
ALT SERPL W/O P-5'-P-CCNC: 16 U/L (ref 10–44)
ANION GAP SERPL CALC-SCNC: 11 MMOL/L (ref 8–16)
AST SERPL-CCNC: 25 U/L (ref 10–40)
BASOPHILS # BLD AUTO: 0.07 K/UL (ref 0–0.2)
BASOPHILS NFR BLD: 0.5 % (ref 0–1.9)
BILIRUB SERPL-MCNC: 0.8 MG/DL (ref 0.1–1)
BUN SERPL-MCNC: 39 MG/DL (ref 8–23)
CALCIUM SERPL-MCNC: 7.4 MG/DL (ref 8.7–10.5)
CHLORIDE SERPL-SCNC: 102 MMOL/L (ref 95–110)
CO2 SERPL-SCNC: 20 MMOL/L (ref 23–29)
CREAT SERPL-MCNC: 8.7 MG/DL (ref 0.5–1.4)
DIFFERENTIAL METHOD BLD: ABNORMAL
EOSINOPHIL # BLD AUTO: 0.1 K/UL (ref 0–0.5)
EOSINOPHIL NFR BLD: 0.9 % (ref 0–8)
ERYTHROCYTE [DISTWIDTH] IN BLOOD BY AUTOMATED COUNT: 22.2 % (ref 11.5–14.5)
EST. GFR  (NO RACE VARIABLE): 6 ML/MIN/1.73 M^2
GLUCOSE SERPL-MCNC: 179 MG/DL (ref 70–110)
HBV SURFACE AG SERPL QL IA: NORMAL
HCT VFR BLD AUTO: 24.5 % (ref 40–54)
HGB BLD-MCNC: 7.8 G/DL (ref 14–18)
IMM GRANULOCYTES # BLD AUTO: 0.14 K/UL (ref 0–0.04)
IMM GRANULOCYTES NFR BLD AUTO: 0.9 % (ref 0–0.5)
LYMPHOCYTES # BLD AUTO: 0.8 K/UL (ref 1–4.8)
LYMPHOCYTES NFR BLD: 5.2 % (ref 18–48)
MCH RBC QN AUTO: 24.5 PG (ref 27–31)
MCHC RBC AUTO-ENTMCNC: 31.8 G/DL (ref 32–36)
MCV RBC AUTO: 77 FL (ref 82–98)
MONOCYTES # BLD AUTO: 1.2 K/UL (ref 0.3–1)
MONOCYTES NFR BLD: 7.8 % (ref 4–15)
NEUTROPHILS # BLD AUTO: 13.1 K/UL (ref 1.8–7.7)
NEUTROPHILS NFR BLD: 84.7 % (ref 38–73)
NRBC BLD-RTO: 0 /100 WBC
PLATELET # BLD AUTO: 343 K/UL (ref 150–450)
PMV BLD AUTO: 10.1 FL (ref 9.2–12.9)
POCT GLUCOSE: 150 MG/DL (ref 70–110)
POCT GLUCOSE: 163 MG/DL (ref 70–110)
POCT GLUCOSE: 179 MG/DL (ref 70–110)
POCT GLUCOSE: 205 MG/DL (ref 70–110)
POTASSIUM SERPL-SCNC: 3.9 MMOL/L (ref 3.5–5.1)
PROT SERPL-MCNC: 4.6 G/DL (ref 6–8.4)
RBC # BLD AUTO: 3.19 M/UL (ref 4.6–6.2)
SODIUM SERPL-SCNC: 133 MMOL/L (ref 136–145)
VANCOMYCIN SERPL-MCNC: 10.9 UG/ML
WBC # BLD AUTO: 15.47 K/UL (ref 3.9–12.7)

## 2024-04-03 PROCEDURE — 63600175 PHARM REV CODE 636 W HCPCS: Mod: HCNC | Performed by: INTERNAL MEDICINE

## 2024-04-03 PROCEDURE — 36415 COLL VENOUS BLD VENIPUNCTURE: CPT | Mod: HCNC,XB | Performed by: HOSPITALIST

## 2024-04-03 PROCEDURE — 80100016 HC MAINTENANCE HEMODIALYSIS: Mod: HCNC

## 2024-04-03 PROCEDURE — 21400001 HC TELEMETRY ROOM: Mod: HCNC

## 2024-04-03 PROCEDURE — 86706 HEP B SURFACE ANTIBODY: CPT | Mod: HCNC | Performed by: INTERNAL MEDICINE

## 2024-04-03 PROCEDURE — 25000003 PHARM REV CODE 250: Mod: HCNC | Performed by: HOSPITALIST

## 2024-04-03 PROCEDURE — 63600175 PHARM REV CODE 636 W HCPCS: Mod: HCNC | Performed by: HOSPITALIST

## 2024-04-03 PROCEDURE — 85025 COMPLETE CBC W/AUTO DIFF WBC: CPT | Mod: HCNC | Performed by: STUDENT IN AN ORGANIZED HEALTH CARE EDUCATION/TRAINING PROGRAM

## 2024-04-03 PROCEDURE — 63600175 PHARM REV CODE 636 W HCPCS: Mod: JG,HCNC | Performed by: INTERNAL MEDICINE

## 2024-04-03 PROCEDURE — 80053 COMPREHEN METABOLIC PANEL: CPT | Mod: HCNC | Performed by: STUDENT IN AN ORGANIZED HEALTH CARE EDUCATION/TRAINING PROGRAM

## 2024-04-03 PROCEDURE — 25000003 PHARM REV CODE 250: Mod: HCNC | Performed by: INTERNAL MEDICINE

## 2024-04-03 PROCEDURE — 80202 ASSAY OF VANCOMYCIN: CPT | Mod: HCNC | Performed by: HOSPITALIST

## 2024-04-03 PROCEDURE — 36415 COLL VENOUS BLD VENIPUNCTURE: CPT | Mod: HCNC | Performed by: STUDENT IN AN ORGANIZED HEALTH CARE EDUCATION/TRAINING PROGRAM

## 2024-04-03 PROCEDURE — 63600175 PHARM REV CODE 636 W HCPCS: Mod: HCNC | Performed by: STUDENT IN AN ORGANIZED HEALTH CARE EDUCATION/TRAINING PROGRAM

## 2024-04-03 PROCEDURE — 87340 HEPATITIS B SURFACE AG IA: CPT | Mod: HCNC | Performed by: INTERNAL MEDICINE

## 2024-04-03 RX ADMIN — TAMSULOSIN HYDROCHLORIDE 0.8 MG: 0.4 CAPSULE ORAL at 08:04

## 2024-04-03 RX ADMIN — APIXABAN 5 MG: 5 TABLET, FILM COATED ORAL at 08:04

## 2024-04-03 RX ADMIN — HYDROMORPHONE HYDROCHLORIDE 0.5 MG: 1 INJECTION, SOLUTION INTRAMUSCULAR; INTRAVENOUS; SUBCUTANEOUS at 04:04

## 2024-04-03 RX ADMIN — NEPHROCAP 1 CAPSULE: 1 CAP ORAL at 08:04

## 2024-04-03 RX ADMIN — ACETAMINOPHEN 650 MG: 325 TABLET ORAL at 08:04

## 2024-04-03 RX ADMIN — HYDROMORPHONE HYDROCHLORIDE 0.5 MG: 1 INJECTION, SOLUTION INTRAMUSCULAR; INTRAVENOUS; SUBCUTANEOUS at 11:04

## 2024-04-03 RX ADMIN — HYDROMORPHONE HYDROCHLORIDE 0.5 MG: 1 INJECTION, SOLUTION INTRAMUSCULAR; INTRAVENOUS; SUBCUTANEOUS at 09:04

## 2024-04-03 RX ADMIN — AMIODARONE HYDROCHLORIDE 400 MG: 200 TABLET ORAL at 08:04

## 2024-04-03 RX ADMIN — EPOETIN ALFA-EPBX 5000 UNITS: 10000 INJECTION, SOLUTION INTRAVENOUS; SUBCUTANEOUS at 08:04

## 2024-04-03 RX ADMIN — VANCOMYCIN HYDROCHLORIDE 750 MG: 750 INJECTION, POWDER, LYOPHILIZED, FOR SOLUTION INTRAVENOUS at 03:04

## 2024-04-03 RX ADMIN — FINASTERIDE 5 MG: 5 TABLET, FILM COATED ORAL at 08:04

## 2024-04-03 RX ADMIN — INSULIN ASPART 2 UNITS: 100 INJECTION, SOLUTION INTRAVENOUS; SUBCUTANEOUS at 08:04

## 2024-04-03 RX ADMIN — ATORVASTATIN CALCIUM 80 MG: 40 TABLET, FILM COATED ORAL at 08:04

## 2024-04-03 RX ADMIN — CEFEPIME 1 G: 1 INJECTION, POWDER, FOR SOLUTION INTRAMUSCULAR; INTRAVENOUS at 08:04

## 2024-04-03 NOTE — PT/OT/SLP PROGRESS
Occupational Therapy      Patient Name:  Miguel Moore   MRN:  [id number]    Attempts made 11:23 AM and 5:03 PM Patient not seen today secondary to HOLLY fro HD upon first attempt, and patient reporting significant fatigue upon second attempt. Will follow-up as able.    4/3/2024

## 2024-04-03 NOTE — NURSING
[facility] [place]urses [place] -- 4 Eyes      4/3/2024       Skin assessed on: Q Shift      [] No Pressure Injuries Present    []Prevention Measures Documented    [x] Yes LDA  for Pressure Injury Previously documented     [] Yes New Pressure Injury Discovered   [] LDA for New Pressure Injury Added      Attending RN:  Stephanie Barthelemy, RN     Second RN:  Tayla Tong RN

## 2024-04-03 NOTE — ASSESSMENT & PLAN NOTE
Patient's anemia is currently controlled. Has not received any PRBCs to date. Etiology likely d/t chronic disease due to ESRD  Current CBC reviewed-   Lab Results   Component Value Date    HGB 7.8 (L) 04/03/2024    HCT 24.5 (L) 04/03/2024     Monitor serial CBC and transfuse if patient becomes hemodynamically unstable, symptomatic or H/H drops below 7/21.

## 2024-04-03 NOTE — ASSESSMENT & PLAN NOTE
Chronic. Latest blood pressure and vitals reviewed-     Temp:  [98.2 °F (36.8 °C)-101.4 °F (38.6 °C)]   Pulse:  [79-96]   Resp:  [16-19]   BP: (112-126)/(56-60)   SpO2:  [90 %-93 %] .   Home meds for hypertension were reviewed and noted below.   Hypertension Medications               amLODIPine (NORVASC) 10 MG tablet Take 1 tablet by mouth once daily    labetaloL (NORMODYNE) 100 MG tablet Take 1 tablet (100 mg total) by mouth once daily.          Continue current management.    Will utilize p.r.n. blood pressure medication only if patient's blood pressure greater than 180/110 and he develops symptoms such as worsening chest pain or shortness of breath.

## 2024-04-03 NOTE — SUBJECTIVE & OBJECTIVE
Interval History: no new complaints.    Review of Systems   HENT:  Negative for ear discharge and ear pain.    Eyes:  Negative for discharge and itching.   Endocrine: Negative for cold intolerance and heat intolerance.   Neurological:  Negative for seizures and syncope.     Objective:     Vital Signs (Most Recent):  Temp: 99.5 °F (37.5 °C) (04/03/24 1417)  Pulse: 96 (04/03/24 1450)  Resp: 16 (04/03/24 1417)  BP: (!) 112/56 (04/03/24 1417)  SpO2: (!) 93 % (04/03/24 1417) Vital Signs (24h Range):  Temp:  [98.2 °F (36.8 °C)-101.4 °F (38.6 °C)] 99.5 °F (37.5 °C)  Pulse:  [79-96] 96  Resp:  [16-19] 16  SpO2:  [90 %-93 %] 93 %  BP: (112-126)/(56-60) 112/56     Weight: 90.4 kg (199 lb 4.7 oz)  Body mass index is 30.3 kg/m².    Intake/Output Summary (Last 24 hours) at 4/3/2024 1605  Last data filed at 4/3/2024 0100  Gross per 24 hour   Intake 220 ml   Output 0 ml   Net 220 ml           Physical Exam  Vitals and nursing note reviewed.   Constitutional:       General: He is not in acute distress.     Appearance: He is ill-appearing.   Cardiovascular:      Rate and Rhythm: Normal rate.      Pulses: Normal pulses.   Pulmonary:      Effort: Pulmonary effort is normal. No respiratory distress.      Breath sounds: No wheezing.   Abdominal:      General: Bowel sounds are normal.   Musculoskeletal:         General: Swelling present.      Comments: Lower extremities with swelling significantly improved. Right arm swelling but no induration, erythema or increased warmth.    Skin:     General: Skin is warm and dry.      Comments: RLE wound with small opening with slight purulent discharge.   Neurological:      Mental Status: He is alert. Mental status is at baseline.   Psychiatric:         Mood and Affect: Mood normal.         Thought Content: Thought content normal.             Significant Labs: All pertinent labs within the past 24 hours have been reviewed.  BMP:   Recent Labs   Lab 04/03/24  0340   *   *   K 3.9   CL  102   CO2 20*   BUN 39*   CREATININE 8.7*   CALCIUM 7.4*       CBC:   Recent Labs   Lab 04/02/24  0340 04/03/24  0340   WBC 16.70* 15.47*   HGB 7.8* 7.8*   HCT 23.3* 24.5*    343         Significant Imaging: I have reviewed all pertinent imaging results/findings within the past 24 hours.

## 2024-04-03 NOTE — ASSESSMENT & PLAN NOTE
Patient has Paroxysmal (<7 days) atrial fibrillation which is uncontrolled. Patient is currently in atrial fibrillation.  - new onset Afib  - on labetalol for BP at home, eliquis for chronic DVT but did miss some doses of eliquis  - in ED given diltiazem gtt but that caused hypotension  - EKG with Afib RVR with normal Qtc  - started amiodarone with conversion to NSR. Changed to PO amiodarone  - however, he had recurrent Afib and was placed back on amio gtt.   - Cardiology consulted  - plan for SONY/DCCV on 3/22/24 but converted to sinus rhythm. Has been in/out of afib but rate controlled  - continue home eliquis  Hold Eliquis for possible I&D    Lab Results   Component Value Date    TSH 2.672 03/20/2024     - TTE normal EF, mild LAE    [id number] - VASc score:  Congestive Heart Failure or EF < 35% NO   Hypertension YES  +1   Age >= 75 NO   Diabetes Mellitus YES  +1   Stroke/TIA prior history YES  +2   Vascular disease (PAD, MI or Aortic Plaque)? YES  +1   Age 65 - 74 YES  +1   Female NO   Total 6   Sinus at this time.

## 2024-04-03 NOTE — PLAN OF CARE
Pt AAO x 4, free from falls/injury, and able to make needs known during shift. VSS on room air. Pt had c/o pain during shift and was given PRN pain med as ordered. Telemetry monitoring continued. Normal sinus rhythm. No acute distress noted. Pt underwent dialysis; scheduled MWF. 1.5 L removed. VSS per dialysis, [place]. ABX vanc ordered per infectious disease. VSS once returned to unit. Bed locked and in lowest position. Bedside table and call light in reach. Pt turned to left, wedge in use. No signs of distress noted. Pt encouraged to utilize call bell when in need of assistance.    Problem: Infection  Goal: Absence of Infection Signs and Symptoms  Outcome: Ongoing, Progressing  Intervention: Prevent or Manage Infection  Flowsheets (Taken 4/3/2024 1445)  Isolation Precautions: protective     Problem: Diabetes Comorbidity  Goal: Blood Glucose Level Within Targeted Range  Outcome: Ongoing, Progressing  Intervention: Monitor and Manage Glycemia  Flowsheets (Taken 4/3/2024 1445)  Glycemic Management: blood glucose monitored     Problem: Infection (Hemodialysis)  Goal: Absence of Infection Signs and Symptoms  Outcome: Ongoing, Progressing     Problem: Skin Injury Risk Increased  Goal: Skin Health and Integrity  Outcome: Ongoing, Progressing  Intervention: Optimize Skin Protection  Flowsheets (Taken 4/3/2024 1446)  Pressure Reduction Techniques:   frequent weight shift encouraged   weight shift assistance provided  Skin Protection: tubing/devices free from skin contact  Head of Bed (HOB) Positioning: HOB elevated     Problem: Impaired Wound Healing  Goal: Optimal Wound Healing  Outcome: Ongoing, Progressing  Intervention: Promote Wound Healing  Flowsheets (Taken 4/3/2024 1446)  Activity Management:   Arm raise - L1   Rolling - L1  Pain Management Interventions:   medication offered   pain management plan reviewed with patient/caregiver     Problem: Fall Injury Risk  Goal: Absence of Fall and Fall-Related Injury  Outcome:  Ongoing, Progressing  Intervention: Identify and Manage Contributors  Flowsheets (Taken 4/3/2024 1446)  Self-Care Promotion: BADL personal objects within reach  Medication Review/Management: medications reviewed  Intervention: Promote Injury-Free Environment  Flowsheets (Taken 4/3/2024 1446)  Safety Promotion/Fall Prevention:   assistive device/personal item within reach   nonskid shoes/socks when out of bed   medications reviewed   lighting adjusted   side rails raised x 2

## 2024-04-03 NOTE — NURSING
Pt back on unit from dialysis. Vital signs stable. Blood sugar 150. Pt provided lunch. Pt denies any distress. No signs noted.

## 2024-04-03 NOTE — PROGRESS NOTES
Pharmacokinetic Initial Assessment: IV Vancomycin    Assessment/Plan:  Vancomycin Random level 10.9 on 4/3  Initiate intravenous vancomycin with loading dose of 750mg once with subsequent doses when random concentrations are less than 20 mcg/mL  Desired empiric serum trough concentration is 10 to 20 mcg/mL  Draw vancomycin random level on 4/5/24 at 0400.  Pharmacy will continue to follow and monitor vancomycin.      Please contact pharmacy at extension [phone] with any questions regarding this assessment.     Thank you for the consult,   Siomara Curtis       Patient brief summary:  Miguel Moore is a 68 y.o. male initiated on antimicrobial therapy with IV Vancomycin for treatment of suspected skin & soft tissue infection    Drug Allergies:   Review of patient's allergies indicates:   Allergen Reactions    Ace inhibitors      Hyperkalemia 8/2018  Other reaction(s): Unknown    Penicillins Hives    Tizanidine      Felt hot       Actual Body Weight:   90.4 kg    Renal Function:   Estimated Creatinine Clearance: 8.8 mL/min (A) (based on SCr of 8.7 mg/dL (H)).,     Dialysis Method (if applicable):  intermittent HD    CBC (last 72 hours):  Recent Labs   Lab Result Units 04/01/24  0451 04/02/24  0340 04/03/24  0340   WBC K/uL 17.48* 16.70* 15.47*   Hemoglobin g/dL 8.2* 7.8* 7.8*   Hematocrit % 23.6* 23.3* 24.5*   Platelets K/uL 327 317 343   Gran % % 83.6* 85.5* 84.7*   Lymph % % 7.0* 6.2* 5.2*   Mono % % 7.7 6.9 7.8   Eosinophil % % 0.2 0.2 0.9   Basophil % % 0.3 0.2 0.5   Differential Method  Automated Automated Automated       Metabolic Panel (last 72 hours):  Recent Labs   Lab Result Units 04/01/24  0451 04/02/24  0340 04/03/24  0340   Sodium mmol/L 132* 133* 133*   Potassium mmol/L 4.3 4.0 3.9   Chloride mmol/L 100 106 102   CO2 mmol/L 19* 17* 20*   Glucose mg/dL 128* 146* 179*   BUN mg/dL 50* 32* 39*   Creatinine mg/dL 10.3* 6.8* 8.7*   Albumin g/dL 1.5* 1.4* 1.5*   Total Bilirubin mg/dL 0.8 1.0 0.8   Alkaline  Phosphatase U/L 129 133 128   AST U/L 33 27 25   ALT U/L 22 17 16       Drug levels (last 3 results):  Recent Labs   Lab Result Units 04/03/24  0340   Vancomycin, Random ug/mL 10.9       Microbiologic Results:  Microbiology Results (last 7 days)       Procedure Component Value Units Date/Time    Blood culture [[id number]] Collected: 03/31/24 0940    Order Status: Completed Specimen: Blood from Peripheral, Forearm, Right Updated: 04/02/24 1103     Blood Culture, Routine No Growth to date      No Growth to date      No Growth to date    Blood culture [[id number]] Collected: 03/31/24 0948    Order Status: Completed Specimen: Blood from Peripheral, Hand, Right Updated: 04/02/24 1103     Blood Culture, Routine No Growth to date      No Growth to date      No Growth to date    Blood culture [[id number]] Collected: 03/27/24 1719    Order Status: Completed Specimen: Blood from Peripheral, Wrist, Right Updated: 04/01/24 1103     Blood Culture, Routine No Growth after 4 days.    Blood culture [[id number]] Collected: 03/27/24 1727    Order Status: Completed Specimen: Blood from Peripheral, Hand, Right Updated: 03/31/24 1903     Blood Culture, Routine No Growth after 4 days.    Clostridium difficile EIA [[id number]]     Order Status: Canceled Specimen: Stool     Blood culture [[id number]] Collected: 03/23/24 1614    Order Status: Completed Specimen: Blood from Peripheral, Hand, Left Updated: 03/27/24 1703     Blood Culture, Routine No Growth after 4 days.    Blood culture [[id number]] Collected: 03/23/24 1615    Order Status: Completed Specimen: Blood Updated: 03/27/24 1703     Blood Culture, Routine No Growth after 4 days.

## 2024-04-03 NOTE — PROGRESS NOTES
"                        Renal Progress Note    Date of Admission:  3/20/2024 10:11 AM    Length of Stay: 14  Days    Subjective: n/a    Objective:    Current Facility-Administered Medications   Medication    acetaminophen tablet 650 mg    amiodarone tablet 400 mg    apixaban tablet 5 mg    atorvastatin tablet 80 mg    dextrose 10% bolus 125 mL 125 mL    dextrose 10% bolus 250 mL 250 mL    diphenoxylate-atropine 2.5-0.025 mg per tablet 1 tablet    epoetin marisol-epbx injection 5,000 Units    finasteride tablet 5 mg    glucagon (human recombinant) injection 1 mg    glucose chewable tablet 16 g    glucose chewable tablet 24 g    HYDROmorphone injection 0.5 mg    insulin aspart U-100 pen 0-5 Units    melatonin tablet 6 mg    naloxone 0.4 mg/mL injection 0.02 mg    ondansetron injection 4 mg    prochlorperazine injection Soln 5 mg    sodium chloride 0.9% bolus 250 mL 250 mL    tamsulosin 24 hr capsule 0.8 mg    vancomycin - pharmacy to dose    vitamin renal formula (B-complex-vitamin c-folic acid) 1 mg per capsule 1 capsule       Vitals:    04/03/24 0745 04/03/24 0821 04/03/24 0948 04/03/24 1050   BP: (!) 126/58      BP Location: Right arm      Patient Position: Lying      Pulse: 85 86  89   Resp: 16  16    Temp: 99.5 °F (37.5 °C)      TempSrc: Oral      SpO2: (!) 93%      Weight:       Height:           I/O last 3 completed shifts:  In: 1060 [P.O.:1060]  Out: 0   No intake/output data recorded.      Physical Exam: seen during Dialysis, NAD      Laboratories:    Recent Labs   Lab 04/03/24  0340   WBC 15.47*   RBC 3.19*   HGB 7.8*   HCT 24.5*      MCV 77*   MCH 24.5*   MCHC 31.8*         Recent Labs   Lab 04/03/24  0340   CALCIUM 7.4*   ALBUMIN 1.5*   PROT 4.6*   *   K 3.9   CO2 20*      BUN 39*   CREATININE 8.7*   ALKPHOS 128   ALT 16   AST 25   BILITOT 0.8         No results for input(s): "COLORU", "CLARITYU", "SPECGRAV", "PHUR", "PROTEINUA", "GLUCOSEU", "BILIRUBINCON", "BLOODU", "WBCU", "RBCU", " ""BACTERIA", "MUCUS" in the last 24 hours.    Microbiology Results (last 7 days)       Procedure Component Value Units Date/Time    Blood culture [[id number]] Collected: 03/31/24 0940    Order Status: Completed Specimen: Blood from Peripheral, Forearm, Right Updated: 04/03/24 1103     Blood Culture, Routine No Growth to date      No Growth to date      No Growth to date      No Growth to date    Blood culture [[id number]] Collected: 03/31/24 0948    Order Status: Completed Specimen: Blood from Peripheral, Hand, Right Updated: 04/03/24 1103     Blood Culture, Routine No Growth to date      No Growth to date      No Growth to date      No Growth to date    Blood culture [[id number]] Collected: 03/27/24 1719    Order Status: Completed Specimen: Blood from Peripheral, Wrist, Right Updated: 04/01/24 1103     Blood Culture, Routine No Growth after 4 days.    Blood culture [[id number]] Collected: 03/27/24 1727    Order Status: Completed Specimen: Blood from Peripheral, Hand, Right Updated: 03/31/24 1903     Blood Culture, Routine No Growth after 4 days.    Clostridium difficile EIA [[id number]]     Order Status: Canceled Specimen: Stool     Blood culture [[id number]] Collected: 03/23/24 1614    Order Status: Completed Specimen: Blood from Peripheral, Hand, Left Updated: 03/27/24 1703     Blood Culture, Routine No Growth after 4 days.    Blood culture [[id number]] Collected: 03/23/24 1615    Order Status: Completed Specimen: Blood Updated: 03/27/24 1703     Blood Culture, Routine No Growth after 4 days.              Diagnostic Tests: n/a        Assessment:    69 y/o male with ESRD on HD admitted with:      - Paroxysmal atrial fibrillation with RVR   - Hyperkalemia RESOLVED  - R-ankle cellulitis / phlebitis  - DM-2 w/renal manifestations  - HTN  - Anemia of ckd  - 2nd. Hyperparathyroidism  - Hypoalbuminemia  - Hx. Of prior CVA with R-hemiplegia  - Seizure disorder due to above  - HLD      Plan:    - Antibiotics per ID  - " Dialysis Q MWF  - UF as tolerated  - Stop Midodrine and watch BPs  - Epogen as needed  - Renal ADA diet + protein supplements  - Amiodarone and Eliquis for PAF as per Cardiology  - Glycemic control and other problems per admitting      Will follow on Dialysis days.

## 2024-04-03 NOTE — ASSESSMENT & PLAN NOTE
Spiked fever on 3/27 despite being on antibiotics for 5 days for right ankle cellulitis. ID consulted. CT ankle with no obvious abscess. Possibly right arm from infiltration. Blood cultures negative to date. U/s of right forearm with no hematoma/abscess - no erythema/warmth.  - On meropenem/vancomycin  - ID following  - unclear etiology at this time  ABX's stopped.  IV's removed.  Repeat BCx.  CT chest/abd/pelvis with no obvious source of infection.  RLE wound with small opening and purulent discharge.  Restarted Vanc and Ortho consulted for possible I&D.

## 2024-04-03 NOTE — CONSULTS
Ortho Consult    Chief Complaint   Patient presents with    Fatigue     Pt BIB EMS from home for malaise x3 days and discoloration to right lower leg. Pt stated he missed his dialysis today.       History of present illness:    68 year old with history of end-stage renal disease on dialysis presents with right leg abscess.  He has had failure to improve on IV antibiotics.  He has drainage from the right leg anterior aspect distally.    Past Medical History:   Diagnosis Date    Allergy     Clotting disorder     Elaquis and APlavix    Deep vein thrombosis     Diabetes mellitus, type 2     Hypertension     Nuclear sclerosis of both eyes 6/9/2017    Renal disorder     Seizures     Stroke     Urinary tract infection        Past Surgical History:   Procedure Laterality Date    CATARACT EXTRACTION W/  INTRAOCULAR LENS IMPLANT Right 10/05/2017    Dr. Tay    CATARACT EXTRACTION W/  INTRAOCULAR LENS IMPLANT Left 10/19/2017    Dr. Tay    CYSTOSCOPY W/ RETROGRADES Bilateral 2/18/2021    Procedure: CYSTOSCOPY, WITH RETROGRADE PYELOGRAM;  Surgeon: JEMMA Styles MD;  Location: [facility] OR;  Service: Urology;  Laterality: Bilateral;  REQUESTING EARLY AS POSSIBE-LO / 2/8/2021 @ 1:13PM  RN Pre Op 2-11-21, Covid NEGATIVE ON  2-17-21.  C A    ESOPHAGOGASTRODUODENOSCOPY N/A 8/17/2020    Procedure: EGD (ESOPHAGOGASTRODUODENOSCOPY);  Surgeon: Desmond Chapman MD;  Location: [facility];  Service: Endoscopy;  Laterality: N/A;    EYE SURGERY Bilateral     cataract    FEMORAL ARTERY STENT      FRACTURE SURGERY      KNEE SURGERY      bilateral scope       Review of patient's allergies indicates:   Allergen Reactions    Ace inhibitors      Hyperkalemia 8/2018  Other reaction(s): Unknown    Penicillins Hives    Tizanidine      Felt hot       Prior to Admission medications    Medication Sig Start Date End Date Taking? Authorizing Provider   amLODIPine (NORVASC) 10 MG tablet Take 1 tablet by mouth once daily 10/26/23   Dair,  "Raciel TERESA MD   apixaban (ELIQUIS) 5 mg Tab Take 1 tablet (5 mg total) by mouth 2 (two) times daily. 10/26/23   Raciel Raymond MD   aspirin (ECOTRIN) 81 MG EC tablet Take 1 tablet (81 mg total) by mouth once daily. 10/26/23 10/25/24  Raciel Raymond MD   atorvastatin (LIPITOR) 80 MG tablet Take 1 tablet (80 mg total) by mouth once daily. 10/26/23 10/25/24  Raciel Raymond MD   finasteride (PROSCAR) 5 mg tablet Take 1 tablet (5 mg total) by mouth once daily. 2/27/24 2/26/25  Lee Styles MD   iron sucrose in NS (VENOFER) 100 mg/100 mL PgBk 50 mg. 12/15/23 12/13/24  Provider, Historical   labetaloL (NORMODYNE) 100 MG tablet Take 1 tablet (100 mg total) by mouth once daily. 10/26/23   Raciel Raymond MD   methoxy peg-epoetin beta (MIRCERA INJ) 75 mcg. 1/19/24 1/17/25  Provider, Historical   mupirocin (BACTROBAN) 2 % ointment Apply to affected area 3 times daily 1/18/24   Lucinda Jimenez, NP   RENVELA 800 mg Tab Take 1 tablet (800 mg total) by mouth 3 (three) times daily with meals. 8/2/22   Raciel Raymond MD   sildenafiL (VIAGRA) 100 MG tablet Take 1 tablet (100 mg total) by mouth daily as needed. 2/27/24   Lee Styles MD   tamsulosin (FLOMAX) 0.4 mg Cap Take 2 capsules (0.8 mg total) by mouth once daily. 2/27/24   Lee Styles MD       Social History     Occupational History    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, etc   Tobacco Use    Smoking status: Never    Smokeless tobacco: Never   Substance and Sexual Activity    Alcohol use: No    Drug use: No    Sexual activity: Not on file       Temp:  [98.2 °F (36.8 °C)-101.4 °F (38.6 °C)] 99.5 °F (37.5 °C)  Pulse:  [79-89] 89  Resp:  [16-20] 16  SpO2:  [90 %-93 %] 93 %  BP: (111-126)/(53-60) 126/58  Height: 5' 8" (172.7 cm)  Weight: 90.4 kg (199 lb 4.7 oz)  BMI (Calculated): 30.3    Physical examination:    Awake male in dialysis.  He denies any other complaints besides right leg.    Right leg reveals area of " "fluctuance over the anterior aspect of the distal tibia in the subcutaneous tissues.  There is a punctate area of purulent appearing drainage.      Recent Labs   Lab 04/03/24  0340   WBC 15.47*   RBC 3.19*   HGB 7.8*   HCT 24.5*      MCV 77*   MCH 24.5*   MCHC 31.8*     Recent Labs   Lab 04/03/24  0340   CALCIUM 7.4*   ALBUMIN 1.5*   PROT 4.6*   *   K 3.9   CO2 20*      BUN 39*   CREATININE 8.7*   ALKPHOS 128   ALT 16   AST 25   BILITOT 0.8     No results for input(s): "PT", "INR", "APTT" in the last 24 hours.    Imaging Results              X-Ray Ankle 2 View Right (Final result)  Result time 03/20/24 18:43:13      Final result by Juan David Barillas MD (03/20/24 18:43:13)                   Impression:      Advanced degenerative changes seen throughout the ankle and hindfoot.  No definite acute osseous abnormality identified.      Electronically signed by: Juan David Barillas MD  Date:    03/20/2024  Time:    18:43               Narrative:    EXAMINATION:  XR ANKLE 2 VIEW RIGHT    CLINICAL HISTORY:  R ankle pain;    TECHNIQUE:  Right ankle AP and lateral views.    COMPARISON:  None    FINDINGS:  No evidence of acute displaced fracture or dislocation.  No obvious acute osseous destructive process seen.  Advanced degenerative changes are seen throughout the ankle and hindfoot.  No abnormal widening of the ankle mortise.  Fairly extensive vascular calcifications are noted.  Soft tissue swelling is seen involving the lower leg and ankle region, more pronounced laterally.                                       US Lower Extremity Arteries Right (Final result)  Result time 03/20/24 11:51:34      Final result by Kareem Luu MD (03/20/24 11:51:34)                   Impression:      Arterial vasculature of the right lower extremity is patent.  However, there is evidence of atherosclerotic change with stenosis at the level of popliteal artery.      Electronically signed by: Kareem Luu, " MD  Date:    03/20/2024  Time:    11:51               Narrative:    EXAMINATION:  US LOWER EXTREMITY ARTERIES RIGHT    CLINICAL HISTORY:  Pain in leg, unspecified    TECHNIQUE:  Bilateral spectral, color and grayscale images of the large arteries of the right lower extremity were performed.    COMPARISON:  None    FINDINGS:  Right lower extremity.    CFA: 95 cm/s, triphasic    DFA: 77 cm/s, triphasic    Prox SFA: 76 cm/s, tri phasic    Mid SFA: 75 cm/s, biphasic    Dist SFA: 49 cm/s, biphasic    Pop A: 104-171 cm/s, biphasic    Per A: 110 cm/s, biphasic    PTA: 22 cm/s, monophasic    SAHRA: 56 cm/s, monophasic    DPA: 59 cm/s, monophasic                                       US Lower Extremity Veins Right (Final result)  Result time 03/20/24 11:42:45      Final result by Kareem Luu MD (03/20/24 11:42:45)                   Impression:      No evidence of deep venous thrombosis in the right lower extremity.      Electronically signed by: Kareem Luu MD  Date:    03/20/2024  Time:    11:42               Narrative:    EXAMINATION:  US LOWER EXTREMITY VEINS RIGHT    CLINICAL HISTORY:  Other specified soft tissue disorders    TECHNIQUE:  Duplex and color flow Doppler evaluation and graded compression of the right lower extremity veins was performed.    COMPARISON:  08/13/2020    FINDINGS:  Right thigh veins: The common femoral, femoral, popliteal, upper greater saphenous, and deep femoral veins are patent and free of thrombus. The veins are normally compressible and have normal phasic flow and augmentation response.    Right calf veins: The visualized calf veins are patent.    Contralateral CFV: The contralateral (left) common femoral vein is patent and free of thrombus.    Miscellaneous: None                                        Current Facility-Administered Medications:     acetaminophen tablet 650 mg, 650 mg, Oral, Q6H PRN, Braden Heredia MD, 650 mg at 04/02/24 1636    amiodarone tablet 400 mg, 400  mg, Oral, BID, Braden Heredia MD, 400 mg at 04/03/24 0823    apixaban tablet 5 mg, 5 mg, Oral, BID, Braden Heredia MD, 5 mg at 04/03/24 0823    atorvastatin tablet 80 mg, 80 mg, Oral, Daily, Braden Heredia MD, 80 mg at 04/03/24 0823    dextrose 10% bolus 125 mL 125 mL, 12.5 g, Intravenous, PRN, Braden Heredia MD    dextrose 10% bolus 250 mL 250 mL, 25 g, Intravenous, PRN, Braden Heredia MD    diphenoxylate-atropine 2.5-0.025 mg per tablet 1 tablet, 1 tablet, Oral, Q6H PRN, Bonifacio Reyes MD, 1 tablet at 04/02/24 2334    epoetin marisol-epbx injection 5,000 Units, 5,000 Units, Subcutaneous, Every Mon, Wed, Fri, JoiKaren MD, 5,000 Units at 04/03/24 0823    finasteride tablet 5 mg, 5 mg, Oral, Daily, Braden Heredia MD, 5 mg at 04/03/24 0823    glucagon (human recombinant) injection 1 mg, 1 mg, Intramuscular, PRN, Braden Heredia MD    glucose chewable tablet 16 g, 16 g, Oral, PRN, Braden Heredia MD    glucose chewable tablet 24 g, 24 g, Oral, PRN, Braden Heredia MD    HYDROmorphone injection 0.5 mg, 0.5 mg, Intravenous, Q6H PRN, Bonifacio Reyes MD, 0.5 mg at 04/03/24 0948    insulin aspart U-100 pen 0-5 Units, 0-5 Units, Subcutaneous, QID (AC + HS) PRN, Braden Heredia MD, 2 Units at 04/03/24 0823    melatonin tablet 6 mg, 6 mg, Oral, Nightly PRN, Braden Heredia MD    naloxone 0.4 mg/mL injection 0.02 mg, 0.02 mg, Intravenous, PRN, Braden Heredia MD    ondansetron injection 4 mg, 4 mg, Intravenous, Q6H PRN, Braden Heredia MD, 4 mg at 03/26/24 1116    prochlorperazine injection Soln 5 mg, 5 mg, Intravenous, Q6H PRN, Braden Heredia MD    sodium chloride 0.9% bolus 250 mL 250 mL, 250 mL, Intravenous, PRN, Braden Heredia MD    tamsulosin 24 hr capsule 0.8 mg, 2 capsule, Oral, Daily, Braden Heredia MD, 0.8 mg at 04/03/24 0823    Pharmacy to dose Vancomycin consult, , , Once  **AND** vancomycin - pharmacy to dose, , Intravenous, pharmacy to manage frequency, Barrie Negro MD    vitamin renal formula (B-complex-vitamin c-folic acid) 1 mg per capsule 1 capsule, 1 capsule, Oral, Daily, Braden Heredia MD, 1 capsule at 04/03/24 0855    Assessment and Plan:    68-year-old male with end-stage renal disease on dialysis presents with right leg infection.  He is failed to improve with IV antibiotics and has appearance of abscess in the tissues over the distal tibia.  There is some drainage noted.    Suggest we take him to surgery for irrigation debridement right leg.  The patient agrees.  NPO after midnight.      Jimbo Montilla MD  [facility]  [phone]  This note has been transcribed with voice recognition software, but not reviewed and may contain unrecognized errors.

## 2024-04-03 NOTE — PT/OT/SLP PROGRESS
Physical Therapy      Patient Name:  Miguel Moore   MRN:  [id number]    Patient not seen today secondary to Dialysis. Will follow-up as able.

## 2024-04-03 NOTE — ASSESSMENT & PLAN NOTE
ESRD via LUE [facility]. Last session 3/18/24. Missed 3/20/24 due to fatigue. Usually MWF  - hyperkalemic on admit. Does not appear volume overloaded.   - Nephrology consulted.  - continue MWF HD

## 2024-04-03 NOTE — PROGRESS NOTES
[facility] Medicine  Progress Note    Patient Name: Miguel Moore  MRN: [id number]  Patient Class: IP- Inpatient   Admission Date: 3/20/2024  Length of Stay: 14 days  Attending Physician: Barrie Negro MD  Primary Care Provider: Raciel Raymond MD        Subjective:     Principal Problem:Fever        HPI:  Mr Miguel Moore is a 68 y.o. man with ESRD on HD, HTN, DM with neuropathy, history of CVA with residual R sided weakness, chronic DVT on eliquis who presented with feeling poorly. He attended his usual dialysis session on Monday 3/18 without issues. He developed fatigue and R ankle swelling. He has some pain with palpation but is able to walk. No reports of trauma. No wounds. No falls. No history of gout. Today he was feeling worse so did not go to dialysis and came to the ED. No fevers. No shortness of breath. Normal appetite. No nausea, vomiting. No chest pain. No palpitations.     In the ED, afebrile with HR initially controlled then to 130s Afib RVR. Started diltiazem but became hypotensive. Diltiazem stopped. Given antibiotics. Admitted for further management.     Overview/Hospital Course:  Mr Miguel Moore is a 68 y.o. man with ESRD on HD, DM who was admitted with new onset Afib RVR, hyperkalemia from missed HD session, and R ankle cellulitis. Started amio gtt with conversion to NSR. TTE EF 55-60%, mild LAE. Cardiology consulted. Now on PO amiodarone and continues home eliquis (on this for chronic DVT). He received HD with resolution of hyperkalemia. He is on CTX for his R ankle cellulitis and was given colchicine in case gout could contribute. Also added vanc,foot swelling is improving.This is improving. He developed recurrent Afib and amio gtt resumed. Cardiology was planning SONY/DCCV on 3/22. But converted to sinus. Transferred to floor. Right ankle cellulitis appears to be improving. Started spiking fevers on 3/27 up to 102.7 F. Blood cultures, u/a ordered. Vanc/Ceftriaxone  changed to Meropenem. CT right ankle with no obvious acute infection, pain is improving. Right forearm with swelling, u/s negative for abscess or hematoma. Blood cultures negative. ID following. Unclear etiology of fevers.  Possible medication related fevers and ABx's stopped.  Right ankle wound now with some purulent discharge.  Restarted on Vand and Ortho consulted for possible I&D.      Interval History: no new complaints.    Review of Systems   HENT:  Negative for ear discharge and ear pain.    Eyes:  Negative for discharge and itching.   Endocrine: Negative for cold intolerance and heat intolerance.   Neurological:  Negative for seizures and syncope.     Objective:     Vital Signs (Most Recent):  Temp: 99.5 °F (37.5 °C) (04/03/24 1417)  Pulse: 96 (04/03/24 1450)  Resp: 16 (04/03/24 1417)  BP: (!) 112/56 (04/03/24 1417)  SpO2: (!) 93 % (04/03/24 1417) Vital Signs (24h Range):  Temp:  [98.2 °F (36.8 °C)-101.4 °F (38.6 °C)] 99.5 °F (37.5 °C)  Pulse:  [79-96] 96  Resp:  [16-19] 16  SpO2:  [90 %-93 %] 93 %  BP: (112-126)/(56-60) 112/56     Weight: 90.4 kg (199 lb 4.7 oz)  Body mass index is 30.3 kg/m².    Intake/Output Summary (Last 24 hours) at 4/3/2024 1605  Last data filed at 4/3/2024 0100  Gross per 24 hour   Intake 220 ml   Output 0 ml   Net 220 ml           Physical Exam  Vitals and nursing note reviewed.   Constitutional:       General: He is not in acute distress.     Appearance: He is ill-appearing.   Cardiovascular:      Rate and Rhythm: Normal rate.      Pulses: Normal pulses.   Pulmonary:      Effort: Pulmonary effort is normal. No respiratory distress.      Breath sounds: No wheezing.   Abdominal:      General: Bowel sounds are normal.   Musculoskeletal:         General: Swelling present.      Comments: Lower extremities with swelling significantly improved. Right arm swelling but no induration, erythema or increased warmth.    Skin:     General: Skin is warm and dry.      Comments: RLE wound with small  opening with slight purulent discharge.   Neurological:      Mental Status: He is alert. Mental status is at baseline.   Psychiatric:         Mood and Affect: Mood normal.         Thought Content: Thought content normal.             Significant Labs: All pertinent labs within the past 24 hours have been reviewed.  BMP:   Recent Labs   Lab 04/03/24  0340   *   *   K 3.9      CO2 20*   BUN 39*   CREATININE 8.7*   CALCIUM 7.4*       CBC:   Recent Labs   Lab 04/02/24  0340 04/03/24  0340   WBC 16.70* 15.47*   HGB 7.8* 7.8*   HCT 23.3* 24.5*    343         Significant Imaging: I have reviewed all pertinent imaging results/findings within the past 24 hours.    Assessment/Plan:      * Fever  Spiked fever on 3/27 despite being on antibiotics for 5 days for right ankle cellulitis. ID consulted. CT ankle with no obvious abscess. Possibly right arm from infiltration. Blood cultures negative to date. U/s of right forearm with no hematoma/abscess - no erythema/warmth.  - On meropenem/vancomycin  - ID following  - unclear etiology at this time  ABX's stopped.  IV's removed.  Repeat BCx.  CT chest/abd/pelvis with no obvious source of infection.  RLE wound with small opening and purulent discharge.  Restarted Vanc and Ortho consulted for possible I&D.      Open wound        Cellulitis of right foot  R ankle pain, swelling, warmth present. Potential gout vs cellulitis. 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 No fracture present. Uric acid normal. Arterial US and venous US with no clots, appropriate flow  - on ceftriaxone/vanc for 7 days and still with pain and now with increased WBC and fevers  - blood cultures currently NGTD  - CT right ankle with no evidence of osteo.  - I do not believe this is source of recurrent fevers at this time  ABX's stopped.  Wound now with small opening with slight purulent discharge.  Restarting Vanc and placing Ortho consult for possible  I&D.      Paroxysmal atrial fibrillation with RVR  Patient has Paroxysmal (<7 days) atrial fibrillation which is uncontrolled. Patient is currently in atrial fibrillation.  - new onset Afib  - on labetalol for BP at home, eliquis for chronic DVT but did miss some doses of eliquis  - in ED given diltiazem gtt but that caused hypotension  - EKG with Afib RVR with normal Qtc  - started amiodarone with conversion to NSR. Changed to PO amiodarone  - however, he had recurrent Afib and was placed back on amio gtt.   - Cardiology consulted  - plan for SONY/DCCV on 3/22/24 but converted to sinus rhythm. Has been in/out of afib but rate controlled  - continue home eliquis  Hold Eliquis for possible I&D    Lab Results   Component Value Date    TSH 2.672 03/20/2024     - TTE normal EF, mild LAE    [id number] - VASc score:  Congestive Heart Failure or EF < 35% NO   Hypertension YES  +1   Age >= 75 NO   Diabetes Mellitus YES  +1   Stroke/TIA prior history YES  +2   Vascular disease (PAD, MI or Aortic Plaque)? YES  +1   Age 65 - 74 YES  +1   Female NO   Total 6   Sinus at this time.      Anemia in chronic kidney disease  Patient's anemia is currently controlled. Has not received any PRBCs to date. Etiology likely d/t chronic disease due to ESRD  Current CBC reviewed-   Lab Results   Component Value Date    HGB 7.8 (L) 04/03/2024    HCT 24.5 (L) 04/03/2024     Monitor serial CBC and transfuse if patient becomes hemodynamically unstable, symptomatic or H/H drops below 7/21.    Chronic deep vein thrombosis (DVT) of popliteal vein of right lower extremity 9/21/20 outside US; unprovoked; anticoagulation  Hold Apixaban for possible I&D      History of stroke  pT,OT.      Secondary hyperparathyroidism of renal origin  Continue renvela       ESRD (end stage renal disease)  ESRD via LUE AVF. Last session 3/18/24. Missed 3/20/24 due to fatigue. Usually MWF  - hyperkalemic on admit. Does not appear volume overloaded.   - Nephrology consulted.  -  continue MWF HD    Hemiplegia and hemiparesis following cerebral infarction affecting right dominant side  No signs of acute stroke. Does have RUE weakness.   - continue home asa, eliquis, statin      Seizure disorder as sequela of cerebrovascular accident  Not currently on antiepileptics. No seizure activity reported. Monitor.       Controlled type 2 diabetes mellitus with chronic kidney disease on chronic dialysis, with long-term current use of insulin  A1c:   Lab Results   Component Value Date    HGBA1C 5.8 (H) 03/20/2024     Meds: SSI PRN to maintain goal 140-180  ADA renal diet, accuchecks, hypoglycemic protocol      Dyslipidemia  Continue statin      Benign essential HTN  Chronic. Latest blood pressure and vitals reviewed-     Temp:  [98.2 °F (36.8 °C)-101.4 °F (38.6 °C)]   Pulse:  [79-96]   Resp:  [16-19]   BP: (112-126)/(56-60)   SpO2:  [90 %-93 %] .   Home meds for hypertension were reviewed and noted below.   Hypertension Medications               amLODIPine (NORVASC) 10 MG tablet Take 1 tablet by mouth once daily    labetaloL (NORMODYNE) 100 MG tablet Take 1 tablet (100 mg total) by mouth once daily.          Continue current management.    Will utilize p.r.n. blood pressure medication only if patient's blood pressure greater than 180/110 and he develops symptoms such as worsening chest pain or shortness of breath.      VTE Risk Mitigation (From admission, onward)      None            Discharge Planning   GARY:      Code Status: Full Code   Is the patient medically ready for discharge?:     Reason for patient still in hospital (select all that apply): Patient trending condition and Treatment  Discharge Plan A: Home Health   Discharge Delays: None known at this time              Barrie Negro MD  Department of Hospital Medicine   [facility] [place] [facility]

## 2024-04-03 NOTE — NURSING
[facility], [facility]  Nurses Note -- 4 Eyes      4/2/2024       Skin assessed on: Q Shift      [] No Pressure Injuries Present    []Prevention Measures Documented    [x] Yes LDA  for Pressure Injury Previously documented       [] Yes New Pressure Injury Discovered   [] LDA for New Pressure Injury Added      Attending RN:  Tayla Tong RN     Second RN:  Lizzy RN

## 2024-04-04 ENCOUNTER — ANESTHESIA EVENT (OUTPATIENT)
Dept: SURGERY | Facility: HOSPITAL | Age: 69
DRG: 264 | End: 2024-04-04
Payer: MEDICARE

## 2024-04-04 ENCOUNTER — ANESTHESIA (OUTPATIENT)
Dept: SURGERY | Facility: HOSPITAL | Age: 69
DRG: 264 | End: 2024-04-04
Payer: MEDICARE

## 2024-04-04 LAB
ALBUMIN SERPL BCP-MCNC: 1.4 G/DL (ref 3.5–5.2)
ALP SERPL-CCNC: 108 U/L (ref 55–135)
ALT SERPL W/O P-5'-P-CCNC: 11 U/L (ref 10–44)
ANION GAP SERPL CALC-SCNC: 10 MMOL/L (ref 8–16)
AST SERPL-CCNC: 24 U/L (ref 10–40)
BACTERIA BLD CULT: NORMAL
BACTERIA BLD CULT: NORMAL
BASOPHILS # BLD AUTO: 0.05 K/UL (ref 0–0.2)
BASOPHILS NFR BLD: 0.3 % (ref 0–1.9)
BILIRUB SERPL-MCNC: 1 MG/DL (ref 0.1–1)
BUN SERPL-MCNC: 27 MG/DL (ref 8–23)
CALCIUM SERPL-MCNC: 7.1 MG/DL (ref 8.7–10.5)
CHLORIDE SERPL-SCNC: 99 MMOL/L (ref 95–110)
CO2 SERPL-SCNC: 24 MMOL/L (ref 23–29)
CREAT SERPL-MCNC: 6.7 MG/DL (ref 0.5–1.4)
DIFFERENTIAL METHOD BLD: ABNORMAL
EOSINOPHIL # BLD AUTO: 0 K/UL (ref 0–0.5)
EOSINOPHIL NFR BLD: 0.3 % (ref 0–8)
ERYTHROCYTE [DISTWIDTH] IN BLOOD BY AUTOMATED COUNT: 21.2 % (ref 11.5–14.5)
EST. GFR  (NO RACE VARIABLE): 8 ML/MIN/1.73 M^2
GLUCOSE SERPL-MCNC: 147 MG/DL (ref 70–110)
HCT VFR BLD AUTO: 20.6 % (ref 40–54)
HGB BLD-MCNC: 6.7 G/DL (ref 14–18)
HGB BLD-MCNC: 7 G/DL (ref 14–18)
IMM GRANULOCYTES # BLD AUTO: 0.15 K/UL (ref 0–0.04)
IMM GRANULOCYTES NFR BLD AUTO: 1 % (ref 0–0.5)
LYMPHOCYTES # BLD AUTO: 1.1 K/UL (ref 1–4.8)
LYMPHOCYTES NFR BLD: 7.7 % (ref 18–48)
MCH RBC QN AUTO: 24.4 PG (ref 27–31)
MCHC RBC AUTO-ENTMCNC: 32.5 G/DL (ref 32–36)
MCV RBC AUTO: 75 FL (ref 82–98)
MONOCYTES # BLD AUTO: 1.5 K/UL (ref 0.3–1)
MONOCYTES NFR BLD: 10.2 % (ref 4–15)
NEUTROPHILS # BLD AUTO: 11.7 K/UL (ref 1.8–7.7)
NEUTROPHILS NFR BLD: 80.5 % (ref 38–73)
NRBC BLD-RTO: 0 /100 WBC
PLATELET # BLD AUTO: 296 K/UL (ref 150–450)
PMV BLD AUTO: 9.9 FL (ref 9.2–12.9)
POCT GLUCOSE: 158 MG/DL (ref 70–110)
POCT GLUCOSE: 158 MG/DL (ref 70–110)
POCT GLUCOSE: 166 MG/DL (ref 70–110)
POCT GLUCOSE: 206 MG/DL (ref 70–110)
POTASSIUM SERPL-SCNC: 3.7 MMOL/L (ref 3.5–5.1)
PROT SERPL-MCNC: 4.4 G/DL (ref 6–8.4)
RBC # BLD AUTO: 2.75 M/UL (ref 4.6–6.2)
SODIUM SERPL-SCNC: 133 MMOL/L (ref 136–145)
WBC # BLD AUTO: 14.56 K/UL (ref 3.9–12.7)

## 2024-04-04 PROCEDURE — 87077 CULTURE AEROBIC IDENTIFY: CPT | Mod: HCNC | Performed by: HOSPITALIST

## 2024-04-04 PROCEDURE — 87075 CULTR BACTERIA EXCEPT BLOOD: CPT | Mod: 59,HCNC | Performed by: HOSPITALIST

## 2024-04-04 PROCEDURE — D9220A PRA ANESTHESIA: Mod: ANES,,, | Performed by: ANESTHESIOLOGY

## 2024-04-04 PROCEDURE — 25000003 PHARM REV CODE 250: Mod: HCNC | Performed by: ORTHOPAEDIC SURGERY

## 2024-04-04 PROCEDURE — 36000704 HC OR TIME LEV I 1ST 15 MIN: Mod: HCNC | Performed by: ORTHOPAEDIC SURGERY

## 2024-04-04 PROCEDURE — 63600175 PHARM REV CODE 636 W HCPCS: Mod: HCNC | Performed by: ORTHOPAEDIC SURGERY

## 2024-04-04 PROCEDURE — 85018 HEMOGLOBIN: CPT | Mod: HCNC | Performed by: HOSPITALIST

## 2024-04-04 PROCEDURE — 85025 COMPLETE CBC W/AUTO DIFF WBC: CPT | Mod: HCNC | Performed by: STUDENT IN AN ORGANIZED HEALTH CARE EDUCATION/TRAINING PROGRAM

## 2024-04-04 PROCEDURE — 63600175 PHARM REV CODE 636 W HCPCS: Mod: HCNC | Performed by: NURSE ANESTHETIST, CERTIFIED REGISTERED

## 2024-04-04 PROCEDURE — 21400001 HC TELEMETRY ROOM: Mod: HCNC

## 2024-04-04 PROCEDURE — 36000705 HC OR TIME LEV I EA ADD 15 MIN: Mod: HCNC | Performed by: ORTHOPAEDIC SURGERY

## 2024-04-04 PROCEDURE — 63600175 PHARM REV CODE 636 W HCPCS: Mod: HCNC | Performed by: HOSPITALIST

## 2024-04-04 PROCEDURE — 87070 CULTURE OTHR SPECIMN AEROBIC: CPT | Mod: 59,HCNC | Performed by: HOSPITALIST

## 2024-04-04 PROCEDURE — 87070 CULTURE OTHR SPECIMN AEROBIC: CPT | Mod: HCNC | Performed by: ORTHOPAEDIC SURGERY

## 2024-04-04 PROCEDURE — 36415 COLL VENOUS BLD VENIPUNCTURE: CPT | Mod: HCNC | Performed by: STUDENT IN AN ORGANIZED HEALTH CARE EDUCATION/TRAINING PROGRAM

## 2024-04-04 PROCEDURE — 36415 COLL VENOUS BLD VENIPUNCTURE: CPT | Mod: HCNC | Performed by: HOSPITALIST

## 2024-04-04 PROCEDURE — 71000033 HC RECOVERY, INTIAL HOUR: Mod: HCNC | Performed by: ORTHOPAEDIC SURGERY

## 2024-04-04 PROCEDURE — 63600175 PHARM REV CODE 636 W HCPCS: Mod: HCNC | Performed by: STUDENT IN AN ORGANIZED HEALTH CARE EDUCATION/TRAINING PROGRAM

## 2024-04-04 PROCEDURE — 0LBN0ZZ EXCISION OF RIGHT LOWER LEG TENDON, OPEN APPROACH: ICD-10-PCS | Performed by: ORTHOPAEDIC SURGERY

## 2024-04-04 PROCEDURE — 37000009 HC ANESTHESIA EA ADD 15 MINS: Mod: HCNC | Performed by: ORTHOPAEDIC SURGERY

## 2024-04-04 PROCEDURE — 25000003 PHARM REV CODE 250: Mod: HCNC | Performed by: HOSPITALIST

## 2024-04-04 PROCEDURE — 27201423 OPTIME MED/SURG SUP & DEVICES STERILE SUPPLY: Mod: HCNC | Performed by: ORTHOPAEDIC SURGERY

## 2024-04-04 PROCEDURE — 37000008 HC ANESTHESIA 1ST 15 MINUTES: Mod: HCNC | Performed by: ORTHOPAEDIC SURGERY

## 2024-04-04 PROCEDURE — 99232 SBSQ HOSP IP/OBS MODERATE 35: CPT | Mod: HCNC,,,

## 2024-04-04 PROCEDURE — D9220A PRA ANESTHESIA: Mod: CRNA,,, | Performed by: NURSE ANESTHETIST, CERTIFIED REGISTERED

## 2024-04-04 PROCEDURE — 87075 CULTR BACTERIA EXCEPT BLOOD: CPT | Mod: HCNC | Performed by: ORTHOPAEDIC SURGERY

## 2024-04-04 PROCEDURE — 80053 COMPREHEN METABOLIC PANEL: CPT | Mod: HCNC | Performed by: STUDENT IN AN ORGANIZED HEALTH CARE EDUCATION/TRAINING PROGRAM

## 2024-04-04 PROCEDURE — 25000003 PHARM REV CODE 250: Mod: HCNC | Performed by: NURSE ANESTHETIST, CERTIFIED REGISTERED

## 2024-04-04 PROCEDURE — 87186 SC STD MICRODIL/AGAR DIL: CPT | Mod: HCNC | Performed by: HOSPITALIST

## 2024-04-04 RX ORDER — PROPOFOL 10 MG/ML
VIAL (ML) INTRAVENOUS
Status: DISCONTINUED | OUTPATIENT
Start: 2024-04-04 | End: 2024-04-04

## 2024-04-04 RX ORDER — PROCHLORPERAZINE EDISYLATE 5 MG/ML
5 INJECTION INTRAMUSCULAR; INTRAVENOUS EVERY 30 MIN PRN
Status: DISCONTINUED | OUTPATIENT
Start: 2024-04-04 | End: 2024-04-04 | Stop reason: HOSPADM

## 2024-04-04 RX ORDER — PHENYLEPHRINE HYDROCHLORIDE 10 MG/ML
INJECTION INTRAVENOUS
Status: DISCONTINUED | OUTPATIENT
Start: 2024-04-04 | End: 2024-04-04

## 2024-04-04 RX ORDER — LIDOCAINE HYDROCHLORIDE 20 MG/ML
INJECTION INTRAVENOUS
Status: DISCONTINUED | OUTPATIENT
Start: 2024-04-04 | End: 2024-04-04

## 2024-04-04 RX ORDER — HEPARIN SODIUM 5000 [USP'U]/ML
5000 INJECTION, SOLUTION INTRAVENOUS; SUBCUTANEOUS EVERY 12 HOURS
Status: DISCONTINUED | OUTPATIENT
Start: 2024-04-04 | End: 2024-04-16

## 2024-04-04 RX ORDER — FENTANYL CITRATE 50 UG/ML
INJECTION, SOLUTION INTRAMUSCULAR; INTRAVENOUS
Status: DISCONTINUED | OUTPATIENT
Start: 2024-04-04 | End: 2024-04-04

## 2024-04-04 RX ORDER — FENTANYL CITRATE 50 UG/ML
25 INJECTION, SOLUTION INTRAMUSCULAR; INTRAVENOUS EVERY 5 MIN PRN
Status: DISCONTINUED | OUTPATIENT
Start: 2024-04-04 | End: 2024-04-04 | Stop reason: HOSPADM

## 2024-04-04 RX ADMIN — ACETAMINOPHEN 650 MG: 325 TABLET ORAL at 10:04

## 2024-04-04 RX ADMIN — HYDROMORPHONE HYDROCHLORIDE 0.5 MG: 1 INJECTION, SOLUTION INTRAMUSCULAR; INTRAVENOUS; SUBCUTANEOUS at 06:04

## 2024-04-04 RX ADMIN — HYDROMORPHONE HYDROCHLORIDE 0.5 MG: 1 INJECTION, SOLUTION INTRAMUSCULAR; INTRAVENOUS; SUBCUTANEOUS at 01:04

## 2024-04-04 RX ADMIN — FINASTERIDE 5 MG: 5 TABLET, FILM COATED ORAL at 12:04

## 2024-04-04 RX ADMIN — INSULIN ASPART 1 UNITS: 100 INJECTION, SOLUTION INTRAVENOUS; SUBCUTANEOUS at 10:04

## 2024-04-04 RX ADMIN — ONDANSETRON 4 MG: 2 INJECTION INTRAMUSCULAR; INTRAVENOUS at 10:04

## 2024-04-04 RX ADMIN — PROPOFOL 50 MG: 10 INJECTION, EMULSION INTRAVENOUS at 10:04

## 2024-04-04 RX ADMIN — FENTANYL CITRATE 25 MCG: 50 INJECTION, SOLUTION INTRAMUSCULAR; INTRAVENOUS at 11:04

## 2024-04-04 RX ADMIN — HYDROMORPHONE HYDROCHLORIDE 0.5 MG: 1 INJECTION, SOLUTION INTRAMUSCULAR; INTRAVENOUS; SUBCUTANEOUS at 08:04

## 2024-04-04 RX ADMIN — PROPOFOL 30 MG: 10 INJECTION, EMULSION INTRAVENOUS at 10:04

## 2024-04-04 RX ADMIN — PROCHLORPERAZINE EDISYLATE 5 MG: 5 INJECTION INTRAMUSCULAR; INTRAVENOUS at 11:04

## 2024-04-04 RX ADMIN — PHENYLEPHRINE HYDROCHLORIDE 100 MCG: 10 INJECTION INTRAVENOUS at 10:04

## 2024-04-04 RX ADMIN — ATORVASTATIN CALCIUM 80 MG: 40 TABLET, FILM COATED ORAL at 12:04

## 2024-04-04 RX ADMIN — TAMSULOSIN HYDROCHLORIDE 0.8 MG: 0.4 CAPSULE ORAL at 12:04

## 2024-04-04 RX ADMIN — FENTANYL CITRATE 50 MCG: 50 INJECTION, SOLUTION INTRAMUSCULAR; INTRAVENOUS at 10:04

## 2024-04-04 RX ADMIN — AMIODARONE HYDROCHLORIDE 400 MG: 200 TABLET ORAL at 08:04

## 2024-04-04 RX ADMIN — NEPHROCAP 1 CAPSULE: 1 CAP ORAL at 12:04

## 2024-04-04 RX ADMIN — HEPARIN SODIUM 5000 UNITS: 5000 INJECTION INTRAVENOUS; SUBCUTANEOUS at 08:04

## 2024-04-04 RX ADMIN — PROPOFOL 20 MG: 10 INJECTION, EMULSION INTRAVENOUS at 10:04

## 2024-04-04 RX ADMIN — LIDOCAINE HYDROCHLORIDE 50 MG: 20 INJECTION, SOLUTION INTRAVENOUS at 10:04

## 2024-04-04 RX ADMIN — ACETAMINOPHEN 650 MG: 325 TABLET ORAL at 04:04

## 2024-04-04 RX ADMIN — CEFEPIME 1 G: 1 INJECTION, POWDER, FOR SOLUTION INTRAMUSCULAR; INTRAVENOUS at 08:04

## 2024-04-04 RX ADMIN — AMIODARONE HYDROCHLORIDE 400 MG: 200 TABLET ORAL at 12:04

## 2024-04-04 NOTE — ASSESSMENT & PLAN NOTE
R ankle pain, swelling, warmth present. Potential gout vs cellulitis. 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 No fracture present. Uric acid normal. Arterial US and venous US with no clots, appropriate flow  - on ceftriaxone/vanc for 7 days and still with pain and now with increased WBC and fevers  - blood cultures currently NGTD  - CT right ankle with no evidence of osteo.  - I do not believe this is source of recurrent fevers at this time  ABX's stopped.  Wound now with small opening with slight purulent discharge.  Restarting Vanc and placing Ortho consult for possible I&D.  I&D today.

## 2024-04-04 NOTE — ANESTHESIA PREPROCEDURE EVALUATION
04/04/2024  Miguel Moore is a 68 y.o., male.      Pre-op Assessment    I have reviewed the Patient Summary Reports.     I have reviewed the Nursing Notes. I have reviewed the NPO Status.   I have reviewed the Medications.     Review of Systems  Anesthesia Hx:  No problems with previous Anesthesia             Denies Family Hx of Anesthesia complications.    Denies Personal Hx of Anesthesia complications.                    Social:  Non-Smoker, No Alcohol Use       Hematology/Oncology:  Hematology Normal   Oncology Normal                                   EENT/Dental:  EENT/Dental Normal           Cardiovascular:     Hypertension    Dysrhythmias atrial fibrillation                                   Pulmonary:  Pulmonary Normal                       Renal/:  Chronic Renal Disease, ESRD                Hepatic/GI:  Hepatic/GI Normal                 Neurological:   CVA    Seizures                                Endocrine:  Diabetes, type 2           Psych:  Psychiatric Normal                    Physical Exam  General: Cooperative, Alert and Oriented    Airway:  Mallampati: II   Mouth Opening: Normal  TM Distance: Normal  Tongue: Normal  Neck ROM: Normal ROM    Dental:  Intact        Anesthesia Plan  Type of Anesthesia, risks & benefits discussed:    Anesthesia Type: MAC, Gen Natural Airway  Intra-op Monitoring Plan: Standard ASA Monitors  Induction:  IV  Informed Consent: Informed consent signed with the Patient and all parties understand the risks and agree with anesthesia plan.  All questions answered. Patient consented to blood products? No  ASA Score: 3    Ready For Surgery From Anesthesia Perspective.     .

## 2024-04-04 NOTE — PT/OT/SLP PROGRESS
Physical Therapy      Patient Name:  Miguel Moore   MRN:  [id number]    Patient not seen today secondary to Off the floor for procedure/surgery (in surgery). Will follow-up later or in am.

## 2024-04-04 NOTE — NURSING
[facility] [place]  Nurses Note -- 4 Eyes      4/4/2024       Skin assessed on: Q Shift      [] No Pressure Injuries Present    []Prevention Measures Documented    [x] Yes LDA  for Pressure Injury Previously documented     [] Yes New Pressure Injury Discovered   [] LDA for New Pressure Injury Added      Attending RN:  Stephanie Barthelemy, RN     Second RN:  Aman Feliz RN

## 2024-04-04 NOTE — TRANSFER OF CARE
"Anesthesia Transfer of Care Note    Patient: Miguel Moore    Procedure(s) Performed: Procedure(s) (LRB):  INCISION AND DRAINAGE right leg (Right)    Patient location: [facility] Surgical Floor    Anesthesia Type: MAC    Transport from OR: Transported from OR on 2-3 L/min O2 by NC with adequate spontaneous ventilation    Post pain: adequate analgesia    Post assessment: no apparent anesthetic complications    Post vital signs: stable    Level of consciousness: sedated    Nausea/Vomiting: no nausea/vomiting    Complications: none    Transfer of care protocol was followed      Last vitals: Visit Vitals  BP (!) 118/56 (BP Location: Right arm, Patient Position: Lying)   Pulse 83   Temp 36.3 °C (97.3 °F) (Tympanic)   Resp 18   Ht 5' 8" (1.727 m)   Wt 90.4 kg (199 lb 4.7 oz)   SpO2 (!) 91%   BMI 30.30 kg/m²     "

## 2024-04-04 NOTE — PLAN OF CARE
Recommendations    1. When medically acceptable, advance diet as tolerated to Renal ADA diet; monitoring PO intake of meals and snacks.   2. Monitor PO intake with diet advancement   3. Continue to monitor weights and labs.   4. Collaboration with medical providers    Goals: 1. Pt to meet % EEN/EPN by RD follow up  Nutrition Goal Status: continues  Communication of RD Recs:  (POC)    Assessment and Plan    Nutrition Problem  diabetes    Related to (etiology):   DM    Signs and Symptoms (as evidenced by):   Hemoglobin A1C   Date Value Ref Range Status   03/20/2024 5.8 (H) 4.0 - 5.6 % Final     Comment:     ADA Screening Guidelines:  5.7-6.4%  Consistent with prediabetes  >or=6.5%  Consistent with diabetes    High levels of fetal hemoglobin interfere with the HbA1C  assay. Heterozygous hemoglobin variants (HbS, HgC, etc)do  not significantly interfere with this assay.   However, presence of multiple variants may affect accuracy.     10/26/2023 5.9 (H) 4.0 - 5.6 % Final     Comment:     ADA Screening Guidelines:  5.7-6.4%  Consistent with prediabetes  >or=6.5%  Consistent with diabetes    High levels of fetal hemoglobin interfere with the HbA1C  assay. Heterozygous hemoglobin variants (HbS, HgC, etc)do  not significantly interfere with this assay.   However, presence of multiple variants may affect accuracy.     04/27/2023 5.9 (H) 4.0 - 5.6 % Final     Comment:     ADA Screening Guidelines:  5.7-6.4%  Consistent with prediabetes  >or=6.5%  Consistent with diabetes    High levels of fetal hemoglobin interfere with the HbA1C  assay. Heterozygous hemoglobin variants (HbS, HgC, etc)do  not significantly interfere with this assay.   However, presence of multiple variants may affect accuracy.           Interventions/Recommendations (treatment strategy):  Collaboration with medical providers    Nutrition Diagnosis Status:   continues

## 2024-04-04 NOTE — ASSESSMENT & PLAN NOTE
Patient's anemia is currently controlled. Has not received any PRBCs to date. Etiology likely d/t chronic disease due to ESRD  Current CBC reviewed-   Lab Results   Component Value Date    HGB 7.0 (L) 04/04/2024    HCT 20.6 (L) 04/04/2024     Monitor serial CBC and transfuse if patient becomes hemodynamically unstable, symptomatic or H/H drops below 7/21.  Patient's H/H has slowly dropped.  No evidence of bleeding.  Will probably need transfusion tomorrow after today's surgical procedure.

## 2024-04-04 NOTE — OP NOTE
Operative Note    Surgery Date: 4/4/2024     Surgeon:  Jimbo Montilla III, MD    Assistant:  Deneen Mccrary LPN, CST    Pre-op Diagnosis:  Right leg abscess    Post-op Diagnosis:  Same    Procedure: Irrigation and debridement right leg, excisional.  Placement of wound VAC dressing right leg    Indications:  68-year-old male with history of end-stage renal disease on dialysis presents with right leg infection.  He failed IV antibiotics and is felt to have an abscess in his right leg.  He is taken to surgery for his right leg.    Procedure in Detail:  After informed consent was obtained the patient was brought to surgery.  IV sedation was administered.  The right leg was prepped and draped usual sterile fashion.  Incision was carried out anterolaterally over the fluctuant area of the distal tibia.  A large abscess was encountered and tracked anteriorly over to the anteromedial tibia as well as posteriorly to the Achilles tendon.  There was some areas of necrotic appearing skin over the dorsal aspect especially posteriorly.  There is a pinhole area of drainage anteriorly.  Purulent material was encountered beneath the skin and down into the deep tissues including into the tendon sheath of the extensors.  Excisional debridement was carried out including excision of a portion of the fascia and extensor retinaculum which was sent for specimen.  Excisional debridement was performed with scissors Rucker elevators and curette all the way down to the periosteum.  Following excisional debridement it was then thoroughly irrigated with Vashe solution with the pulse lavage system.  Hemostasis was obtained with electrocautery.  A wound VAC dressing was placed into the defect and it was applied to suction and sealed appropriately.  Was placed in sterile dressing and Ace wrap and brought to recovery stable condition.    Estimated Blood Loss:  50 mL    Complications:  none         Pathology specimen:   Specimens (From admission,  onward)      Pathology specimen sent from fascia and extensor retinaculum.    Cultures sent from abscess.              Jimbo Montilla MD  [facility]  This note has been transcribed with voice recognition software, but not reviewed and may contain unrecognized errors.

## 2024-04-04 NOTE — NURSING
Hydrocolloid noted on L buttocks, with scant blood drainage noted . Change dressing done. Pt tolerated well.

## 2024-04-04 NOTE — CONSULTS
[facility]  Wound Care  WO CAMRON    Patient Name:  Miguel Moore   MRN:  [id number]  Date: 4/4/2024  Diagnosis: Fever    History:     Past Medical History:   Diagnosis Date    Allergy     Clotting disorder     Elaquis and APlavix    Deep vein thrombosis     Diabetes mellitus, type 2     Hypertension     Nuclear sclerosis of both eyes 6/9/2017    Renal disorder     Seizures     Stroke     Urinary tract infection        Social History     Socioeconomic History    Marital status: Single   Occupational History    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, etc   Tobacco Use    Smoking status: Never    Smokeless tobacco: Never   Substance and Sexual Activity    Alcohol use: No    Drug use: No   Social History Narrative    Lives with daughter       Precautions:     Allergies as of 03/20/2024 - Reviewed 03/20/2024   Allergen Reaction Noted    Ace inhibitors  09/03/2018    Penicillins Hives 01/13/2015    Tizanidine  06/30/2020       [facility] Assessment Details/Treatment     Active Problem List with Overview Notes    Diagnosis Date Noted    Fever 03/29/2024    Open wound 03/26/2024    Paroxysmal atrial fibrillation with RVR 03/20/2024    Cellulitis of right foot 03/20/2024    Bilateral posterior capsular opacification 05/02/2023    Pseudophakia 01/06/2022    History of diabetic ulcer of foot     Elevated PSA 2/18/21 prostate bx normal 02/18/2021 2/18/21 prostate bx normal  1/25/24 MRI prostate PIRADS 2      Pulmonary nodule 1/2021 4 mm nodule. 01/06/2021 1/6/2021 CT abd/pelvis: 4 mm nodule in the right middle lobe      Chronic deep vein thrombosis (DVT) of popliteal vein of right lower extremity 9/21/20 outside US; unprovoked; anticoagulation 09/21/2020    History of fungal infection candida parasilosis hospitalization 8/2020 08/29/2020     -Found to have candidemia - source unclear  -infectious disease consulted, Started on micafungin and now converted to fluconazole  -Repeat cultures negative so  far  -Dialysis line removed 8/28.   line replaced on 08/31 after line holiday.  -end date of fluconazole will be 09/11/2020.  Patient has ophthalmology follow-up scheduled on 09/08/2020. Tentative end date 9/11/2020 If no endophthalmitis. If noted to have endophthalmitis during optho visit, would need at least 4-6 weeks of treatment      Anemia in chronic kidney disease 08/26/2020    Nephrotic syndrome 08/16/2020    Type 2 diabetes mellitus with diabetic neuropathy, with long-term current use of insulin 05/10/2018    History of stroke 12/04/2017    CME (cystoid macular edema), bilateral 11/16/2017    Refractive error 11/16/2017    Senile nuclear sclerosis 10/05/2017    Right sided weakness 09/11/2017    Secondary hyperparathyroidism of renal origin 08/03/2017    Vitamin D deficiency 08/03/2017    Nuclear sclerosis, left 06/09/2017    Cortical cataract of both eyes 06/09/2017    DM type 2 without retinopathy 06/09/2017    Microcytosis 9/2019 labs c/w AoCD and AoCKD 03/22/2017     Seen on admission as well 2015; normal Hb, low MCV.  Normal RDW  08/17/2020 EGD normal esophagus.  Discolored nodular and texture change mucosa in the antrum.  Normal duodenum.  Path with foveolar hyperplasia and early xanthoma formation.  H pylori negative.  Mild chronic inflammation without acute activity      ESRD (end stage renal disease) 03/22/2017 2/27/20 renal US with dopplers no ALF.  Medical renal disease  8/2020 renal US: 1. Symmetric diffusely increased cortical echogenicity and elevated resistive indices, nonspecific indicators of chronic medical renal disease.  2. Prostatomegaly.  Some of the provided images are suggestive of a distinct hyperechoic component of the prostate gland, of uncertain etiology or significance, and potentially artifactual.  Dedicated prostate ultrasound or MRI may be of benefit for further characterization.    Started on HD while hospitalized for progression to ESRD 8/2020  -Continue dialysis per  nephrology  -Outpatient HD has been arranged  -Had planned discharge 8/27 if remained afebrile and cultures negative.  Unfortunately his blood culture grew Candida parapsilosis  -Dr. Gomez with ID consulted and case discussed with him.  Started micafungin 8/27 and now on fluconazole.  -Dialysis line removed after HD 8/28 and plan line holiday over weekend.  -new line by IR on 8/31, tolerated dialysis fluid.  -continue outpatient dialysis.      Benign essential HTN 03/21/2017 01/06/2021 TTE mild left atrial enlargement.  LV normal size and systolic function LVEF 55%.  Indeterminate diastolic function.  Mild right atrial enlargement.  Normal RV systolic function.  Normal RV size.  PA pressure 20.  Normal CVP.  Three.      Dyslipidemia 03/21/2017    Controlled type 2 diabetes mellitus with chronic kidney disease on chronic dialysis, with long-term current use of insulin 03/21/2017    BPH (benign prostatic hyperplasia) 2/18/21 prostate bx normal 03/21/2017 6/26/2017 renal US mod prostatomegaly.      Seizure disorder as sequela of cerebrovascular accident 03/21/2017    Hemiplegia and hemiparesis following cerebral infarction affecting right dominant side 03/21/2017 4/2 Nursing consult for altered skin integrity of right buttock  4/4 S/P Irrigation and debridement right leg, excisional. Placement of KCI/3M VAC.   Assessment:  Photodocumentation    Right buttock- Stage 2 pressure injury 5 cm circular area. Scant serosanguineous drainage.   Treatment/Plan:  Local wound care to right buttock with hydrocolloid dressing. Nusing to change dressing every 3 days and prn. Continue Pressure Injury Prevention Interventions.  Recommendations made to primary team. Orders placed.     04/04/2024

## 2024-04-04 NOTE — PROGRESS NOTES
[facility] - Med Surg  Adult Nutrition  Consult Note    SUMMARY     Recommendations    1. When medically acceptable, advance diet as tolerated to Renal ADA diet; monitoring PO intake of meals and snacks.   2. Monitor PO intake with diet advancement   3. Continue to monitor weights and labs.   4. Collaboration with medical providers    Goals: 1. Pt to meet % EEN/EPN by RD follow up  Nutrition Goal Status: continues  Communication of RD Recs:  (POC)    Assessment and Plan    Nutrition Problem  diabetes    Related to (etiology):   DM    Signs and Symptoms (as evidenced by):   Hemoglobin A1C   Date Value Ref Range Status   03/20/2024 5.8 (H) 4.0 - 5.6 % Final     Comment:     ADA Screening Guidelines:  5.7-6.4%  Consistent with prediabetes  >or=6.5%  Consistent with diabetes    High levels of fetal hemoglobin interfere with the HbA1C  assay. Heterozygous hemoglobin variants (HbS, HgC, etc)do  not significantly interfere with this assay.   However, presence of multiple variants may affect accuracy.     10/26/2023 5.9 (H) 4.0 - 5.6 % Final     Comment:     ADA Screening Guidelines:  5.7-6.4%  Consistent with prediabetes  >or=6.5%  Consistent with diabetes    High levels of fetal hemoglobin interfere with the HbA1C  assay. Heterozygous hemoglobin variants (HbS, HgC, etc)do  not significantly interfere with this assay.   However, presence of multiple variants may affect accuracy.     04/27/2023 5.9 (H) 4.0 - 5.6 % Final     Comment:     ADA Screening Guidelines:  5.7-6.4%  Consistent with prediabetes  >or=6.5%  Consistent with diabetes    High levels of fetal hemoglobin interfere with the HbA1C  assay. Heterozygous hemoglobin variants (HbS, HgC, etc)do  not significantly interfere with this assay.   However, presence of multiple variants may affect accuracy.           Interventions/Recommendations (treatment strategy):  Collaboration with medical providers    Nutrition Diagnosis Status:   continues      Reason for  Clinic Care Coordination Contact  Care Team Conversations    Unable to engage patient in care coordination services.   Letter sent to patient offering to reopen services if he so chooses.   .Audrey Gil, CLIFFORDN, RN, PHN  Clinic Care Coordinator  [facility]  [phone]       "Assessment    Reason For Assessment: length of stay  Diagnosis:  (paroxysmal atrial fibrillation with RVR)  Relevant Medical History: .pmh  Interdisciplinary Rounds: did not attend  General Information Comments: RD follow up. Pt currently NPO previously on renal ada diet with varied intake % since admit. BMI 30.30, obese grade l. No recent significant weight changes. Pt continues on isolation precautions. RD to continues to monitor and follow up.     Pt LOS > 8 days. Pt admitted with paroxysmal atrial fibrillation with RVR. Pmhx of  DM ll. ESRD, stroke and open wound. Currently on renal dm diet with varied intake % since admit. BMI 27.99, overweight. No recent significant weight changes per chart review. NFPE not able to be performed at this time, pt is on isolation precautions  Nutrition Discharge Planning: renal ADA    Nutrition Risk Screen    Nutrition Risk Screen: no indicators present    Nutrition/Diet History    Spiritual, Cultural Beliefs, Episcopal Practices, Values that Affect Care: no  Food Allergies: NKFA    Anthropometrics    Temp: 99.4 °F (37.4 °C)  Height Method: Stated  Height: 5' 8" (172.7 cm)  Height (inches): 68 in  Weight Method: Bed Scale  Weight: 90.4 kg (199 lb 4.7 oz)  Weight (lb): 199.3 lb  Ideal Body Weight (IBW), Male: 154 lb  % Ideal Body Weight, Male (lb): 119.54 %  BMI (Calculated): 30.3  BMI Grade: 25 - 29.9 - overweight       Lab/Procedures/Meds    Pertinent Labs Reviewed: reviewed  BMP  Lab Results   Component Value Date     (L) 04/04/2024    K 3.7 04/04/2024    CL 99 04/04/2024    CO2 24 04/04/2024    BUN 27 (H) 04/04/2024    CREATININE 6.7 (H) 04/04/2024    CALCIUM 7.1 (L) 04/04/2024    ANIONGAP 10 04/04/2024    EGFRNORACEVR 8 (A) 04/04/2024       Pertinent Medications Reviewed: reviewed  Current Outpatient Medications   Medication Instructions    amLODIPine (NORVASC) 10 MG tablet Take 1 tablet by mouth once daily    apixaban (ELIQUIS) 5 mg, Oral, 2 times daily    " aspirin (ECOTRIN) 81 mg, Oral, Daily    atorvastatin (LIPITOR) 80 mg, Oral, Daily    finasteride (PROSCAR) 5 mg, Oral, Daily    iron sucrose in NS (VENOFER) 50 mg    labetaloL (NORMODYNE) 100 mg, Oral, Daily    methoxy peg-epoetin beta (MIRCERA INJ) 75 mcg    mupirocin (BACTROBAN) 2 % ointment Apply to affected area 3 times daily    RENVELA 800 mg, Oral, 3 times daily with meals    sildenafiL (VIAGRA) 100 mg, Oral, Daily PRN    tamsulosin (FLOMAX) 0.8 mg, Oral, Daily          Estimated/Assessed Needs    Weight Used For Calorie Calculations: 83.5 kg (184 lb 1.4 oz)  Energy Calorie Requirements (kcal): 1888 kcal  Energy Need Method: Cole-St Jeor (x 1.2)  Protein Requirements: 84 g  Weight Used For Protein Calculations: 83.5 kg (184 lb 1.4 oz)     Estimated Fluid Requirement Method: RDA Method  RDA Method (mL): 1888         Nutrition Prescription Ordered    Current Diet Order: renal ada    Evaluation of Received Nutrient/Fluid Intake    I/O: i/o  Energy Calories Required: not meeting needs  Protein Required: not meeting needs  Fluid Required: not meeting needs  Comments: lbm 3/26/24  % Intake of Estimated Energy Needs: 25 - 50 %  % Meal Intake: 25 - 50 %    Nutrition Risk    Level of Risk/Frequency of Follow-up: low       Monitor and Evaluation    Food and Nutrient Intake: food and beverage intake  Food and Nutrient Adminstration: diet order  Knowledge/Beliefs/Attitudes: food and nutrition knowledge/skill, beliefs and attitudes  Physical Activity and Function: nutrition-related ADLs and IADLs, factors affecting access to physical activity  Anthropometric Measurements: weight, weight change, body mass index  Biochemical Data, Medical Tests and Procedures: electrolyte and renal panel  Nutrition-Focused Physical Findings: overall appearance       Nutrition Follow-Up    RD Follow-up?: Yes    Katie Chacon, Registration Eligible, Provisional LDN

## 2024-04-04 NOTE — ASSESSMENT & PLAN NOTE
Chronic. Latest blood pressure and vitals reviewed-     Temp:  [97.3 °F (36.3 °C)-101.5 °F (38.6 °C)]   Pulse:  [69-93]   Resp:  [14-19]   BP: (102-131)/(51-60)   SpO2:  [91 %-98 %] .   Home meds for hypertension were reviewed and noted below.   Hypertension Medications               amLODIPine (NORVASC) 10 MG tablet Take 1 tablet by mouth once daily    labetaloL (NORMODYNE) 100 MG tablet Take 1 tablet (100 mg total) by mouth once daily.          Continue current management.    Will utilize p.r.n. blood pressure medication only if patient's blood pressure greater than 180/110 and he develops symptoms such as worsening chest pain or shortness of breath.

## 2024-04-04 NOTE — PLAN OF CARE
Problem: Adult Inpatient Plan of Care  Goal: Plan of Care Review  Outcome: Ongoing, Progressing  Flowsheets (Taken 4/4/2024 0633)  Plan of Care Reviewed With: patient  Goal: Optimal Comfort and Wellbeing  Outcome: Ongoing, Progressing  Intervention: Monitor Pain and Promote Comfort  Flowsheets (Taken 4/4/2024 0633)  Pain Management Interventions:   pillow support provided   quiet environment facilitated   position adjusted

## 2024-04-04 NOTE — SUBJECTIVE & OBJECTIVE
Interval History: feeling better.    Review of Systems   HENT:  Negative for ear discharge and ear pain.    Eyes:  Negative for discharge and itching.   Endocrine: Negative for cold intolerance and heat intolerance.   Neurological:  Negative for seizures and syncope.     Objective:     Vital Signs (Most Recent):  Temp: 98.5 °F (36.9 °C) (04/04/24 1221)  Pulse: 81 (04/04/24 1446)  Resp: 16 (04/04/24 1325)  BP: (!) 121/53 (04/04/24 1221)  SpO2: (!) 93 % (04/04/24 1221) Vital Signs (24h Range):  Temp:  [97.3 °F (36.3 °C)-101.5 °F (38.6 °C)] 98.5 °F (36.9 °C)  Pulse:  [69-93] 81  Resp:  [14-19] 16  SpO2:  [91 %-98 %] 93 %  BP: (102-131)/(51-60) 121/53     Weight: 90.4 kg (199 lb 4.7 oz)  Body mass index is 30.3 kg/m².    Intake/Output Summary (Last 24 hours) at 4/4/2024 1559  Last data filed at 4/4/2024 1319  Gross per 24 hour   Intake 485.63 ml   Output --   Net 485.63 ml           Physical Exam  Vitals and nursing note reviewed.   Constitutional:       General: He is not in acute distress.     Appearance: He is ill-appearing.   Cardiovascular:      Rate and Rhythm: Normal rate.      Pulses: Normal pulses.   Pulmonary:      Effort: Pulmonary effort is normal. No respiratory distress.      Breath sounds: No wheezing.   Abdominal:      General: Bowel sounds are normal.   Musculoskeletal:         General: Swelling present.      Comments: Lower extremities with swelling significantly improved. Right arm swelling but no induration, erythema or increased warmth.    Skin:     General: Skin is warm and dry.      Comments: RLE wound with small opening with slight purulent discharge.   Neurological:      Mental Status: He is alert. Mental status is at baseline.   Psychiatric:         Mood and Affect: Mood normal.         Thought Content: Thought content normal.             Significant Labs: All pertinent labs within the past 24 hours have been reviewed.  BMP:   Recent Labs   Lab 04/04/24  0349   *   *   K 3.7   CL  99   CO2 24   BUN 27*   CREATININE 6.7*   CALCIUM 7.1*       CBC:   Recent Labs   Lab 04/03/24  0340 04/04/24  0349 04/04/24  0650   WBC 15.47* 14.56*  --    HGB 7.8* 6.7* 7.0*   HCT 24.5* 20.6*  --     296  --          Significant Imaging: I have reviewed all pertinent imaging results/findings within the past 24 hours.

## 2024-04-04 NOTE — ASSESSMENT & PLAN NOTE
Patient has Paroxysmal (<7 days) atrial fibrillation which is uncontrolled. Patient is currently in atrial fibrillation.  - new onset Afib  - on labetalol for BP at home, eliquis for chronic DVT but did miss some doses of eliquis  - in ED given diltiazem gtt but that caused hypotension  - EKG with Afib RVR with normal Qtc  - started amiodarone with conversion to NSR. Changed to PO amiodarone  - however, he had recurrent Afib and was placed back on amio gtt.   - Cardiology consulted  - plan for SONY/DCCV on 3/22/24 but converted to sinus rhythm. Has been in/out of afib but rate controlled  - continue home eliquis  Hold Eliquis for I&D and possible future procedures.    Lab Results   Component Value Date    TSH 2.672 03/20/2024     - TTE normal EF, mild LAE    [id number] - VASc score:  Congestive Heart Failure or EF < 35% NO   Hypertension YES  +1   Age >= 75 NO   Diabetes Mellitus YES  +1   Stroke/TIA prior history YES  +2   Vascular disease (PAD, MI or Aortic Plaque)? YES  +1   Age 65 - 74 YES  +1   Female NO   Total 6   Sinus at this time.

## 2024-04-04 NOTE — ANESTHESIA POSTPROCEDURE EVALUATION
Anesthesia Post Evaluation    Patient: Miguel Moore    Procedure(s) Performed: Procedure(s) (LRB):  INCISION AND DRAINAGE right leg (Right)    Final Anesthesia Type: MAC      Patient location during evaluation: PACU  Patient participation: Yes- Able to Participate  Level of consciousness: awake and alert  Post-procedure vital signs: reviewed and stable  Pain management: adequate  Airway patency: patent    PONV status at discharge: No PONV  Anesthetic complications: no      Cardiovascular status: hemodynamically stable and blood pressure returned to baseline  Respiratory status: nasal cannula, spontaneous ventilation and unassisted  Hydration status: euvolemic  Follow-up not needed.              Vitals Value Taken Time   /51 04/04/24 1131   Temp 36.3 °C (97.3 °F) 04/04/24 1107   Pulse 75 04/04/24 1145   Resp 14 04/04/24 1145   SpO2 95 % 04/04/24 1145   Vitals shown include unvalidated device data.      No case tracking events are documented in the log.      Pain/Skyler Score: Pain Rating Prior to Med Admin: 5 (4/4/2024 11:24 AM)  Pain Rating Post Med Admin: 3 (4/4/2024  6:34 AM)  Skyler Score: 9 (4/4/2024 11:33 AM)

## 2024-04-04 NOTE — NURSING
[facility] [place]  Nurses Note -- 4 Eyes      4/3/2024       Skin assessed on: Q Shift      [] No Pressure Injuries Present    []Prevention Measures Documented    [x] Yes LDA  for Pressure Injury Previously documented     [] Yes New Pressure Injury Discovered   [] LDA for New Pressure Injury Added      Attending RN:  Aman Feliz RN     Second RN:  DARRION Hills

## 2024-04-04 NOTE — PROGRESS NOTES
Vancomycin consult follow-up:    Patient reviewed, dialysis scheduled every Mon, Wed, Fri, no new levels, no dose today; Next levels due: random due 4/5/2024 at 0400

## 2024-04-04 NOTE — PROGRESS NOTES
[facility]  Progress Note    Patient Name: Miguel Moore  MRN: [id number]  Patient Class: IP- Inpatient   Admission Date: 3/20/2024  Length of Stay: 15 days  Attending Physician: Barrie Negro MD  Primary Care Provider: Raciel Raymond MD        Subjective:     Principal Problem:Fever        HPI:  Mr Miguel Moore is a 68 y.o. man with ESRD on HD, HTN, DM with neuropathy, history of CVA with residual R sided weakness, chronic DVT on eliquis who presented with feeling poorly. He attended his usual dialysis session on Monday 3/18 without issues. He developed fatigue and R ankle swelling. He has some pain with palpation but is able to walk. No reports of trauma. No wounds. No falls. No history of gout. Today he was feeling worse so did not go to dialysis and came to the ED. No fevers. No shortness of breath. Normal appetite. No nausea, vomiting. No chest pain. No palpitations.     In the ED, afebrile with HR initially controlled then to 130s Afib RVR. Started diltiazem but became hypotensive. Diltiazem stopped. Given antibiotics. Admitted for further management.     Overview/Hospital Course:  Mr Miguel Moore is a 68 y.o. man with ESRD on HD, DM who was admitted with new onset Afib RVR, hyperkalemia from missed HD session, and R ankle cellulitis. Started amio gtt with conversion to NSR. TTE EF 55-60%, mild LAE. Cardiology consulted. Now on PO amiodarone and continues home eliquis (on this for chronic DVT). He received HD with resolution of hyperkalemia. He is on CTX for his R ankle cellulitis and was given colchicine in case gout could contribute. Also added vanc,foot swelling is improving.This is improving. He developed recurrent Afib and amio gtt resumed. Cardiology was planning SONY/DCCV on 3/22. But converted to sinus. Transferred to floor. Right ankle cellulitis appears to be improving. Started spiking fevers on 3/27 up to 102.7 F. Blood cultures, u/a ordered. Vanc/Ceftriaxone  changed to Meropenem. CT right ankle with no obvious acute infection, pain is improving. Right forearm with swelling, u/s negative for abscess or hematoma. Blood cultures negative. ID following. Unclear etiology of fevers.  Possible medication related fevers and ABx's stopped.  Right ankle wound now with some purulent discharge.  Restarted on Vand and Ortho consulted for possible I&D.      Interval History: feeling better.    Review of Systems   HENT:  Negative for ear discharge and ear pain.    Eyes:  Negative for discharge and itching.   Endocrine: Negative for cold intolerance and heat intolerance.   Neurological:  Negative for seizures and syncope.     Objective:     Vital Signs (Most Recent):  Temp: 98.5 °F (36.9 °C) (04/04/24 1221)  Pulse: 81 (04/04/24 1446)  Resp: 16 (04/04/24 1325)  BP: (!) 121/53 (04/04/24 1221)  SpO2: (!) 93 % (04/04/24 1221) Vital Signs (24h Range):  Temp:  [97.3 °F (36.3 °C)-101.5 °F (38.6 °C)] 98.5 °F (36.9 °C)  Pulse:  [69-93] 81  Resp:  [14-19] 16  SpO2:  [91 %-98 %] 93 %  BP: (102-131)/(51-60) 121/53     Weight: 90.4 kg (199 lb 4.7 oz)  Body mass index is 30.3 kg/m².    Intake/Output Summary (Last 24 hours) at 4/4/2024 1559  Last data filed at 4/4/2024 1319  Gross per 24 hour   Intake 485.63 ml   Output --   Net 485.63 ml           Physical Exam  Vitals and nursing note reviewed.   Constitutional:       General: He is not in acute distress.     Appearance: He is ill-appearing.   Cardiovascular:      Rate and Rhythm: Normal rate.      Pulses: Normal pulses.   Pulmonary:      Effort: Pulmonary effort is normal. No respiratory distress.      Breath sounds: No wheezing.   Abdominal:      General: Bowel sounds are normal.   Musculoskeletal:         General: Swelling present.      Comments: Lower extremities with swelling significantly improved. Right arm swelling but no induration, erythema or increased warmth.    Skin:     General: Skin is warm and dry.      Comments: RLE wound with  small opening with slight purulent discharge.   Neurological:      Mental Status: He is alert. Mental status is at baseline.   Psychiatric:         Mood and Affect: Mood normal.         Thought Content: Thought content normal.             Significant Labs: All pertinent labs within the past 24 hours have been reviewed.  BMP:   Recent Labs   Lab 04/04/24  0349   *   *   K 3.7   CL 99   CO2 24   BUN 27*   CREATININE 6.7*   CALCIUM 7.1*       CBC:   Recent Labs   Lab 04/03/24  0340 04/04/24  0349 04/04/24  0650   WBC 15.47* 14.56*  --    HGB 7.8* 6.7* 7.0*   HCT 24.5* 20.6*  --     296  --          Significant Imaging: I have reviewed all pertinent imaging results/findings within the past 24 hours.    Assessment/Plan:      * Fever  Spiked fever on 3/27 despite being on antibiotics for 5 days for right ankle cellulitis. ID consulted. CT ankle with no obvious abscess. Possibly right arm from infiltration. Blood cultures negative to date. U/s of right forearm with no hematoma/abscess - no erythema/warmth.  - On meropenem/vancomycin  - ID following  - unclear etiology at this time  ABX's stopped.  IV's removed.  Repeat BCx.  CT chest/abd/pelvis with no obvious source of infection.  RLE wound with small opening and purulent discharge.  Restarted Vanc and Ortho consulted for possible I&D.      Open wound        Cellulitis of right foot  R ankle pain, swelling, warmth present. Potential gout vs cellulitis. 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 No fracture present. Uric acid normal. Arterial US and venous US with no clots, appropriate flow  - on ceftriaxone/vanc for 7 days and still with pain and now with increased WBC and fevers  - blood cultures currently NGTD  - CT right ankle with no evidence of osteo.  - I do not believe this is source of recurrent fevers at this time  ABX's stopped.  Wound now with small opening with slight purulent discharge.  Restarting Vanc and  placing Ortho consult for possible I&D.  I&D today.      Paroxysmal atrial fibrillation with RVR  Patient has Paroxysmal (<7 days) atrial fibrillation which is uncontrolled. Patient is currently in atrial fibrillation.  - new onset Afib  - on labetalol for BP at home, eliquis for chronic DVT but did miss some doses of eliquis  - in ED given diltiazem gtt but that caused hypotension  - EKG with Afib RVR with normal Qtc  - started amiodarone with conversion to NSR. Changed to PO amiodarone  - however, he had recurrent Afib and was placed back on amio gtt.   - Cardiology consulted  - plan for SONY/DCCV on 3/22/24 but converted to sinus rhythm. Has been in/out of afib but rate controlled  - continue home eliquis  Hold Eliquis for I&D and possible future procedures.    Lab Results   Component Value Date    TSH 2.672 03/20/2024     - TTE normal EF, mild LAE    [id number] - VASc score:  Congestive Heart Failure or EF < 35% NO   Hypertension YES  +1   Age >= 75 NO   Diabetes Mellitus YES  +1   Stroke/TIA prior history YES  +2   Vascular disease (PAD, MI or Aortic Plaque)? YES  +1   Age 65 - 74 YES  +1   Female NO   Total 6   Sinus at this time.      Anemia in chronic kidney disease  Patient's anemia is currently controlled. Has not received any PRBCs to date. Etiology likely d/t chronic disease due to ESRD  Current CBC reviewed-   Lab Results   Component Value Date    HGB 7.0 (L) 04/04/2024    HCT 20.6 (L) 04/04/2024     Monitor serial CBC and transfuse if patient becomes hemodynamically unstable, symptomatic or H/H drops below 7/21.  Patient's H/H has slowly dropped.  No evidence of bleeding.  Will probably need transfusion tomorrow after today's surgical procedure.    Chronic deep vein thrombosis (DVT) of popliteal vein of right lower extremity 9/21/20 outside US; unprovoked; anticoagulation  Hold Apixaban for possible I&D  Continue holding as patient may need repeat procedure.  Heparin for DVT prophylaxis.      History of  stroke  pT,OT.      Secondary hyperparathyroidism of renal origin  Continue renvela       ESRD (end stage renal disease)  ESRD via LUE AVF. Last session 3/18/24. Missed 3/20/24 due to fatigue. Usually MWF  - hyperkalemic on admit. Does not appear volume overloaded.   - Nephrology consulted.  - continue MWF HD    Hemiplegia and hemiparesis following cerebral infarction affecting right dominant side  No signs of acute stroke. Does have RUE weakness.   - continue home asa, eliquis, statin      Seizure disorder as sequela of cerebrovascular accident  Not currently on antiepileptics. No seizure activity reported. Monitor.       Controlled type 2 diabetes mellitus with chronic kidney disease on chronic dialysis, with long-term current use of insulin  A1c:   Lab Results   Component Value Date    HGBA1C 5.8 (H) 03/20/2024     Meds: SSI PRN to maintain goal 140-180  ADA renal diet, accuchecks, hypoglycemic protocol      Dyslipidemia  Continue statin      Benign essential HTN  Chronic. Latest blood pressure and vitals reviewed-     Temp:  [97.3 °F (36.3 °C)-101.5 °F (38.6 °C)]   Pulse:  [69-93]   Resp:  [14-19]   BP: (102-131)/(51-60)   SpO2:  [91 %-98 %] .   Home meds for hypertension were reviewed and noted below.   Hypertension Medications               amLODIPine (NORVASC) 10 MG tablet Take 1 tablet by mouth once daily    labetaloL (NORMODYNE) 100 MG tablet Take 1 tablet (100 mg total) by mouth once daily.          Continue current management.    Will utilize p.r.n. blood pressure medication only if patient's blood pressure greater than 180/110 and he develops symptoms such as worsening chest pain or shortness of breath.      VTE Risk Mitigation (From admission, onward)           Ordered     heparin (porcine) injection 5,000 Units  Every 12 hours         04/04/24 1602                    Discharge Planning   GARY:      Code Status: Full Code   Is the patient medically ready for discharge?:     Reason for patient still in  hospital (select all that apply): Treatment  Discharge Plan A: Home Health   Discharge Delays: None known at this time              Barrie Negro MD  Department of Hospital Medicine   [facility]

## 2024-04-04 NOTE — PLAN OF CARE
Pt AAO x 4, free from falls/injury, and able to make needs known during shift. VSS on 1-2L NC. Telemetry monitoring continued, normal sinus rhythm. Pt underwent surgery, I&D, to R anterior lower leg. Wound vac 125 suction in place. Surgical area wrapped in ACE bandage. Pt had c/o pain and was given PRN pain med as ordered. No acute distress noted, family at bedside. Bed locked and in lowest position. Bedside table and call light in reach.     Problem: Infection  Goal: Absence of Infection Signs and Symptoms  Outcome: Ongoing, Progressing  Intervention: Prevent or Manage Infection  Flowsheets (Taken 4/4/2024 1413)  Isolation Precautions: protective     Problem: Skin Injury Risk Increased  Goal: Skin Health and Integrity  Outcome: Ongoing, Progressing  Intervention: Optimize Skin Protection  Flowsheets (Taken 4/4/2024 1413)  Pressure Reduction Techniques:   weight shift assistance provided   pressure points protected   positioned off wounds  Pressure Reduction Devices: positioning supports utilized  Skin Protection:   adhesive use limited   tubing/devices free from skin contact  Head of Bed (HOB) Positioning: HOB elevated     Problem: Impaired Wound Healing  Goal: Optimal Wound Healing  Outcome: Ongoing, Progressing  Intervention: Promote Wound Healing  Flowsheets (Taken 4/4/2024 1413)  Activity Management:   Arm raise - L1   Rolling - L1  Pain Management Interventions:   medication offered   position adjusted   pillow support provided     Problem: Fall Injury Risk  Goal: Absence of Fall and Fall-Related Injury  Outcome: Ongoing, Progressing  Intervention: Identify and Manage Contributors  Flowsheets (Taken 4/4/2024 1413)  Self-Care Promotion: BADL personal objects within reach  Medication Review/Management: medications reviewed

## 2024-04-04 NOTE — NURSING
Hemoglobin is 6.7. Dr Negro notified. Repeat hemoglobin as ordered. Put an order done. Lab notified.

## 2024-04-04 NOTE — ASSESSMENT & PLAN NOTE
Hold Apixaban for possible I&D  Continue holding as patient may need repeat procedure.  Heparin for DVT prophylaxis.

## 2024-04-05 ENCOUNTER — ANESTHESIA EVENT (OUTPATIENT)
Dept: SURGERY | Facility: HOSPITAL | Age: 69
DRG: 264 | End: 2024-04-05
Payer: MEDICARE

## 2024-04-05 LAB
ABO + RH BLD: NORMAL
ALBUMIN SERPL BCP-MCNC: 1.4 G/DL (ref 3.5–5.2)
ALP SERPL-CCNC: 102 U/L (ref 55–135)
ALT SERPL W/O P-5'-P-CCNC: 11 U/L (ref 10–44)
ANION GAP SERPL CALC-SCNC: 10 MMOL/L (ref 8–16)
AST SERPL-CCNC: 22 U/L (ref 10–40)
BASOPHILS # BLD AUTO: 0.05 K/UL (ref 0–0.2)
BASOPHILS NFR BLD: 0.4 % (ref 0–1.9)
BILIRUB SERPL-MCNC: 0.7 MG/DL (ref 0.1–1)
BLD GP AB SCN CELLS X3 SERPL QL: NORMAL
BLD PROD TYP BPU: NORMAL
BLOOD UNIT EXPIRATION DATE: NORMAL
BLOOD UNIT TYPE CODE: 6200
BLOOD UNIT TYPE: NORMAL
BUN SERPL-MCNC: 39 MG/DL (ref 8–23)
CALCIUM SERPL-MCNC: 7.4 MG/DL (ref 8.7–10.5)
CHLORIDE SERPL-SCNC: 99 MMOL/L (ref 95–110)
CO2 SERPL-SCNC: 24 MMOL/L (ref 23–29)
CODING SYSTEM: NORMAL
CREAT SERPL-MCNC: 8.5 MG/DL (ref 0.5–1.4)
CROSSMATCH INTERPRETATION: NORMAL
DIFFERENTIAL METHOD BLD: ABNORMAL
DISPENSE STATUS: NORMAL
EOSINOPHIL # BLD AUTO: 0.1 K/UL (ref 0–0.5)
EOSINOPHIL NFR BLD: 0.5 % (ref 0–8)
ERYTHROCYTE [DISTWIDTH] IN BLOOD BY AUTOMATED COUNT: 21.1 % (ref 11.5–14.5)
EST. GFR  (NO RACE VARIABLE): 6 ML/MIN/1.73 M^2
GLUCOSE SERPL-MCNC: 148 MG/DL (ref 70–110)
HCT VFR BLD AUTO: 19.5 % (ref 40–54)
HGB BLD-MCNC: 6.2 G/DL (ref 14–18)
IMM GRANULOCYTES # BLD AUTO: 0.15 K/UL (ref 0–0.04)
IMM GRANULOCYTES NFR BLD AUTO: 1.1 % (ref 0–0.5)
LYMPHOCYTES # BLD AUTO: 0.8 K/UL (ref 1–4.8)
LYMPHOCYTES NFR BLD: 5.4 % (ref 18–48)
MCH RBC QN AUTO: 24.4 PG (ref 27–31)
MCHC RBC AUTO-ENTMCNC: 31.8 G/DL (ref 32–36)
MCV RBC AUTO: 77 FL (ref 82–98)
MONOCYTES # BLD AUTO: 1.2 K/UL (ref 0.3–1)
MONOCYTES NFR BLD: 8.9 % (ref 4–15)
NEUTROPHILS # BLD AUTO: 11.7 K/UL (ref 1.8–7.7)
NEUTROPHILS NFR BLD: 83.7 % (ref 38–73)
NRBC BLD-RTO: 0 /100 WBC
PLATELET # BLD AUTO: 282 K/UL (ref 150–450)
PMV BLD AUTO: 9.8 FL (ref 9.2–12.9)
POCT GLUCOSE: 141 MG/DL (ref 70–110)
POCT GLUCOSE: 174 MG/DL (ref 70–110)
POCT GLUCOSE: 199 MG/DL (ref 70–110)
POTASSIUM SERPL-SCNC: 3.9 MMOL/L (ref 3.5–5.1)
PROT SERPL-MCNC: 4.3 G/DL (ref 6–8.4)
RBC # BLD AUTO: 2.54 M/UL (ref 4.6–6.2)
SODIUM SERPL-SCNC: 133 MMOL/L (ref 136–145)
SPECIMEN OUTDATE: NORMAL
TRANS ERYTHROCYTES VOL PATIENT: NORMAL ML
VANCOMYCIN SERPL-MCNC: 16.3 UG/ML
WBC # BLD AUTO: 13.99 K/UL (ref 3.9–12.7)

## 2024-04-05 PROCEDURE — 25000003 PHARM REV CODE 250: Mod: HCNC | Performed by: ORTHOPAEDIC SURGERY

## 2024-04-05 PROCEDURE — 80053 COMPREHEN METABOLIC PANEL: CPT | Mod: HCNC | Performed by: ORTHOPAEDIC SURGERY

## 2024-04-05 PROCEDURE — 25000003 PHARM REV CODE 250: Mod: HCNC | Performed by: STUDENT IN AN ORGANIZED HEALTH CARE EDUCATION/TRAINING PROGRAM

## 2024-04-05 PROCEDURE — 36415 COLL VENOUS BLD VENIPUNCTURE: CPT | Mod: HCNC

## 2024-04-05 PROCEDURE — 94761 N-INVAS EAR/PLS OXIMETRY MLT: CPT | Mod: HCNC

## 2024-04-05 PROCEDURE — 30233N1 TRANSFUSION OF NONAUTOLOGOUS RED BLOOD CELLS INTO PERIPHERAL VEIN, PERCUTANEOUS APPROACH: ICD-10-PCS | Performed by: HOSPITALIST

## 2024-04-05 PROCEDURE — 21400001 HC TELEMETRY ROOM: Mod: HCNC

## 2024-04-05 PROCEDURE — 63600175 PHARM REV CODE 636 W HCPCS: Mod: HCNC | Performed by: ORTHOPAEDIC SURGERY

## 2024-04-05 PROCEDURE — 27201040 HC RC 50 FILTER: Mod: HCNC

## 2024-04-05 PROCEDURE — P9021 RED BLOOD CELLS UNIT: HCPCS | Mod: HCNC | Performed by: INTERNAL MEDICINE

## 2024-04-05 PROCEDURE — 99900035 HC TECH TIME PER 15 MIN (STAT): Mod: HCNC

## 2024-04-05 PROCEDURE — 27000221 HC OXYGEN, UP TO 24 HOURS: Mod: HCNC

## 2024-04-05 PROCEDURE — 85025 COMPLETE CBC W/AUTO DIFF WBC: CPT | Mod: HCNC | Performed by: ORTHOPAEDIC SURGERY

## 2024-04-05 PROCEDURE — 80100016 HC MAINTENANCE HEMODIALYSIS: Mod: HCNC

## 2024-04-05 PROCEDURE — 63600175 PHARM REV CODE 636 W HCPCS: Mod: HCNC | Performed by: STUDENT IN AN ORGANIZED HEALTH CARE EDUCATION/TRAINING PROGRAM

## 2024-04-05 PROCEDURE — 80202 ASSAY OF VANCOMYCIN: CPT | Mod: HCNC | Performed by: ORTHOPAEDIC SURGERY

## 2024-04-05 PROCEDURE — 86850 RBC ANTIBODY SCREEN: CPT | Mod: HCNC

## 2024-04-05 PROCEDURE — 63600175 PHARM REV CODE 636 W HCPCS: Mod: HCNC | Performed by: HOSPITALIST

## 2024-04-05 PROCEDURE — 86920 COMPATIBILITY TEST SPIN: CPT | Mod: HCNC | Performed by: INTERNAL MEDICINE

## 2024-04-05 PROCEDURE — 63600175 PHARM REV CODE 636 W HCPCS: Mod: JG,HCNC | Performed by: ORTHOPAEDIC SURGERY

## 2024-04-05 RX ADMIN — VANCOMYCIN HYDROCHLORIDE 500 MG: 500 INJECTION, POWDER, LYOPHILIZED, FOR SOLUTION INTRAVENOUS at 04:04

## 2024-04-05 RX ADMIN — ACETAMINOPHEN 650 MG: 325 TABLET ORAL at 08:04

## 2024-04-05 RX ADMIN — FINASTERIDE 5 MG: 5 TABLET, FILM COATED ORAL at 09:04

## 2024-04-05 RX ADMIN — NEPHROCAP 1 CAPSULE: 1 CAP ORAL at 09:04

## 2024-04-05 RX ADMIN — EPOETIN ALFA-EPBX 5000 UNITS: 10000 INJECTION, SOLUTION INTRAVENOUS; SUBCUTANEOUS at 09:04

## 2024-04-05 RX ADMIN — HEPARIN SODIUM 5000 UNITS: 5000 INJECTION INTRAVENOUS; SUBCUTANEOUS at 09:04

## 2024-04-05 RX ADMIN — HYDROMORPHONE HYDROCHLORIDE 0.5 MG: 1 INJECTION, SOLUTION INTRAMUSCULAR; INTRAVENOUS; SUBCUTANEOUS at 03:04

## 2024-04-05 RX ADMIN — AMIODARONE HYDROCHLORIDE 400 MG: 200 TABLET ORAL at 09:04

## 2024-04-05 RX ADMIN — CEFEPIME 1 G: 1 INJECTION, POWDER, FOR SOLUTION INTRAMUSCULAR; INTRAVENOUS at 08:04

## 2024-04-05 RX ADMIN — ATORVASTATIN CALCIUM 80 MG: 40 TABLET, FILM COATED ORAL at 09:04

## 2024-04-05 RX ADMIN — HYDROMORPHONE HYDROCHLORIDE 0.5 MG: 1 INJECTION, SOLUTION INTRAMUSCULAR; INTRAVENOUS; SUBCUTANEOUS at 07:04

## 2024-04-05 RX ADMIN — TAMSULOSIN HYDROCHLORIDE 0.8 MG: 0.4 CAPSULE ORAL at 09:04

## 2024-04-05 RX ADMIN — HYDROMORPHONE HYDROCHLORIDE 0.5 MG: 1 INJECTION, SOLUTION INTRAMUSCULAR; INTRAVENOUS; SUBCUTANEOUS at 10:04

## 2024-04-05 RX ADMIN — HEPARIN SODIUM 5000 UNITS: 5000 INJECTION INTRAVENOUS; SUBCUTANEOUS at 08:04

## 2024-04-05 RX ADMIN — AMIODARONE HYDROCHLORIDE 400 MG: 200 TABLET ORAL at 08:04

## 2024-04-05 NOTE — PLAN OF CARE
Problem: Adult Inpatient Plan of Care  Goal: Absence of Hospital-Acquired Illness or Injury  Outcome: Ongoing, Progressing     Problem: Infection  Goal: Absence of Infection Signs and Symptoms  Outcome: Ongoing, Progressing

## 2024-04-05 NOTE — ASSESSMENT & PLAN NOTE
R ankle pain, swelling, warmth present. Potential gout vs cellulitis. 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 No fracture present. Uric acid normal. Arterial US and venous US with no clots, appropriate flow  - on ceftriaxone/vanc for 7 days and still with pain and now with increased WBC and fevers  - blood cultures currently NGTD  - CT right ankle with no evidence of osteo.  - I do not believe this is source of recurrent fevers at this time  ABX's stopped.  -Wound now with small opening with slight purulent discharge.    -Right ankle wound then had new abscess/discharge sp I& D with wound vac placement by Orthopedic Surgery on 4/4/24. Continue to hold eliquis until clear by Orthopedic Surgery. Follow up operative cultures. Continue vanc and cefepine.

## 2024-04-05 NOTE — PROGRESS NOTES
Ortho Daily Progress Note    Miguel Moore is a 68 y.o. male admitted on 3/20/2024      Chief Complaint/Reason for admission: Fatigue (Pt BIB EMS from home for malaise x3 days and discoloration to right lower leg. Pt stated he missed his dialysis today.)       Hospital Day: 16  Post Op Day: 1 Day Post-Op    Patient is seen in dialysis unit still having pain to the right leg    _______________    Vitals:    04/05/24 1400 04/05/24 1415 04/05/24 1545 04/05/24 1552   BP:  (!) 112/54 (!) 128/58    Pulse: 96 96 82    Resp:   18 16   Temp:   98.6 °F (37 °C)    TempSrc:   Oral    SpO2:   96%    Weight:       Height:           Vital Signs (Most Recent)  Temp: 98.6 °F (37 °C) (04/05/24 1545)  Pulse: 82 (04/05/24 1545)  Resp: 16 (04/05/24 1552)  BP: (!) 128/58 (04/05/24 1545)  SpO2: 96 % (04/05/24 1545)    Vital Signs Range (Last 24H):  Temp:  [98.2 °F (36.8 °C)-100.6 °F (38.1 °C)]   Pulse:  [60-96]   Resp:  [16-18]   BP: (106-133)/(50-75)   SpO2:  [94 %-97 %]       Physical:   Patient currently see even dialysis  Incision/ dressing clean/dry/intact    Wound VAC in place      Recent Labs     04/03/24  0340 04/04/24  0349 04/04/24  0650 04/05/24  0400   K 3.9 3.7  --  3.9   CALCIUM 7.4* 7.1*  --  7.4*   WBC 15.47* 14.56*  --  13.99*   HGB 7.8* 6.7* 7.0* 6.2*   HCT 24.5* 20.6*  --  19.5*    296  --  282       I/O last 3 completed shifts:  In: 338.1 [P.O.:240; IV Piggyback:98.1]  Out: 125 [Other:125]          Assessment:  A/P   Status post right leg I and D    Plan:     Plan for repeat I and D right leg tomorrow with Dr. Barillas.        Gage Zamora MD  [facility]

## 2024-04-05 NOTE — ASSESSMENT & PLAN NOTE
No signs of acute stroke. Does have RUE weakness.   - continue home asa, statin  - resume apixaban once cleared by Orthopedic surgery

## 2024-04-05 NOTE — SUBJECTIVE & OBJECTIVE
Interval History: denies sob, lives with girlfriend and daughter    Review of Systems   HENT:  Negative for ear discharge and ear pain.    Eyes:  Negative for discharge and itching.   Endocrine: Negative for cold intolerance and heat intolerance.   Skin:  Positive for wound.   Neurological:  Negative for seizures and syncope.     Objective:     Vital Signs (Most Recent):  Temp: 98.4 °F (36.9 °C) (04/05/24 0702)  Pulse: 80 (04/05/24 1129)  Resp: 18 (04/05/24 1129)  BP: (!) 126/55 (04/05/24 1129)  SpO2: 95 % (04/05/24 1129) Vital Signs (24h Range):  Temp:  [98.4 °F (36.9 °C)-101.5 °F (38.6 °C)] 98.4 °F (36.9 °C)  Pulse:  [60-83] 80  Resp:  [14-20] 18  SpO2:  [92 %-97 %] 95 %  BP: (106-135)/(52-69) 126/55     Weight: 90.4 kg (199 lb 4.7 oz)  Body mass index is 30.3 kg/m².    Intake/Output Summary (Last 24 hours) at 4/5/2024 1138  Last data filed at 4/5/2024 0830  Gross per 24 hour   Intake 458.09 ml   Output 125 ml   Net 333.09 ml         Physical Exam  Vitals and nursing note reviewed.   Constitutional:       General: He is not in acute distress.     Appearance: He is ill-appearing.   Cardiovascular:      Rate and Rhythm: Normal rate.      Pulses: Normal pulses.   Pulmonary:      Effort: Pulmonary effort is normal. No respiratory distress.      Breath sounds: No wheezing.   Abdominal:      General: Bowel sounds are normal.   Musculoskeletal:         General: Swelling present.      Comments: Lower extremities with swelling significantly improved. Right arm swelling but no induration, erythema or increased warmth.    Skin:     General: Skin is warm and dry.      Comments: 4/4/2024 RLE wound with small opening with slight purulent discharge.  4/5/2024 sp I&D  RLE with wound vac    Neurological:      Mental Status: He is alert. Mental status is at baseline.   Psychiatric:         Mood and Affect: Mood normal.         Thought Content: Thought content normal.             Significant Labs: All pertinent labs within the past  24 hours have been reviewed.    Significant Imaging: I have reviewed all pertinent imaging results/findings within the past 24 hours.

## 2024-04-05 NOTE — PROGRESS NOTES
Renal Progress Note    Date of Admission:  3/20/2024 10:11 AM    Length of Stay: 16  Days    Subjective: n/a    Objective:    Current Facility-Administered Medications   Medication    acetaminophen tablet 650 mg    amiodarone tablet 400 mg    atorvastatin tablet 80 mg    ceFEPIme (MAXIPIME) 1 g in dextrose 5 % in water (D5W) 100 mL IVPB (MB+)    dextrose 10% bolus 125 mL 125 mL    dextrose 10% bolus 250 mL 250 mL    diphenoxylate-atropine 2.5-0.025 mg per tablet 1 tablet    epoetin marisol-epbx injection 5,000 Units    finasteride tablet 5 mg    glucagon (human recombinant) injection 1 mg    glucose chewable tablet 16 g    glucose chewable tablet 24 g    heparin (porcine) injection 5,000 Units    HYDROmorphone injection 0.5 mg    insulin aspart U-100 pen 0-5 Units    melatonin tablet 6 mg    naloxone 0.4 mg/mL injection 0.02 mg    ondansetron injection 4 mg    prochlorperazine injection Soln 5 mg    sodium chloride 0.9% bolus 250 mL 250 mL    tamsulosin 24 hr capsule 0.8 mg    vancomycin - pharmacy to dose    vitamin renal formula (B-complex-vitamin c-folic acid) 1 mg per capsule 1 capsule       Vitals:    04/05/24 0136 04/05/24 0335 04/05/24 0546 04/05/24 0702   BP:   (!) 112/56 106/69   BP Location:    Right arm   Patient Position:   Lying Lying   Pulse: 74 68 72 69   Resp:   18 16   Temp:   99 °F (37.2 °C) 98.4 °F (36.9 °C)   TempSrc:   Oral Oral   SpO2:   97% 96%   Weight:       Height:           I/O last 3 completed shifts:  In: 338.1 [P.O.:240; IV Piggyback:98.1]  Out: 125 [Other:125]  No intake/output data recorded.      Physical Exam: Seen during Dialysis otherwise unchanged      Laboratories:    Recent Labs   Lab 04/05/24  0400   WBC 13.99*   RBC 2.54*   HGB 6.2*   HCT 19.5*      MCV 77*   MCH 24.4*   MCHC 31.8*         Recent Labs   Lab 04/05/24  0400   CALCIUM 7.4*   ALBUMIN 1.4*   PROT 4.3*   *   K 3.9   CO2 24   CL 99   BUN 39*   CREATININE 8.5*   ALKPHOS 102   ALT 11  "  AST 22   BILITOT 0.7         No results for input(s): "COLORU", "CLARITYU", "SPECGRAV", "PHUR", "PROTEINUA", "GLUCOSEU", "BILIRUBINCON", "BLOODU", "WBCU", "RBCU", "BACTERIA", "MUCUS" in the last 24 hours.    Microbiology Results (last 7 days)       Procedure Component Value Units Date/Time    Aerobic culture [[id number]] Collected: 04/04/24 1110    Order Status: Sent Specimen: Incision site from Ankle, Right Updated: 04/04/24 1147    Culture, Anaerobe [[id number]] Collected: 04/04/24 1110    Order Status: Sent Specimen: Incision site from Ankle, Right Updated: 04/04/24 1146    Culture, Anaerobe [[id number]] Collected: 04/04/24 1115    Order Status: Sent Specimen: Wound from Ankle, Right Updated: 04/04/24 1146    Aerobic culture [[id number]] Collected: 04/04/24 1115    Order Status: Sent Specimen: Wound from Ankle, Right Updated: 04/04/24 1146    Blood culture [[id number]] Collected: 03/31/24 0940    Order Status: Completed Specimen: Blood from Peripheral, Forearm, Right Updated: 04/04/24 1103     Blood Culture, Routine No Growth after 4 days.    Blood culture [[id number]] Collected: 03/31/24 0948    Order Status: Completed Specimen: Blood from Peripheral, Hand, Right Updated: 04/04/24 1103     Blood Culture, Routine No Growth after 4 days.    Blood culture [[id number]] Collected: 03/27/24 1719    Order Status: Completed Specimen: Blood from Peripheral, Wrist, Right Updated: 04/01/24 1103     Blood Culture, Routine No Growth after 4 days.    Blood culture [[id number]] Collected: 03/27/24 1727    Order Status: Completed Specimen: Blood from Peripheral, Hand, Right Updated: 03/31/24 1903     Blood Culture, Routine No Growth after 4 days.    Clostridium difficile EIA [[id number]]     Order Status: Canceled Specimen: Stool               Diagnostic Tests: n/a        Assessment:    69 y/o male with ESRD on HD admitted with:      - Paroxysmal atrial fibrillation with RVR   - Hyperkalemia RESOLVED  - R-ankle cellulitis / " phlebitis  - DM-2 w/renal manifestations  - HTN  - Anemia of ckd  - 2nd. Hyperparathyroidism  - Severe Hypoalbuminemia  - Hx. Of prior CVA with R-hemiplegia  - Seizure disorder due to above  - HLD      Plan:    - Antibiotics per ID  - Dialysis Q MWF  - UF as tolerated  - Epogen as needed  - Transfuse during Dialysis today and f/u H&H  - Renal ADA diet + protein supplements  - Amiodarone and Eliquis for PAF as per Cardiology  - Glycemic control and other problems per admitting      Will follow on Dialysis days.

## 2024-04-05 NOTE — NURSING
[facility] [place]urses [place] -- 4 Eyes      4/5/2024       Skin assessed on: Q Shift      [] No Pressure Injuries Present    []Prevention Measures Documented    [x] Yes LDA  for Pressure Injury Previously documented     [] Yes New Pressure Injury Discovered   [] LDA for New Pressure Injury Added      Attending RN:  Ben Pina RN     Second RN:  DARRION Hills

## 2024-04-05 NOTE — PT/OT/SLP PROGRESS
Occupational Therapy      Patient Name:  Miguel Moore   MRN:  [id number]    Patient not seen today secondary to pt just returned from dialysis, nurse/PCT in room for vitals and pt about to eat lunch. Will follow-up.     4/5/2024

## 2024-04-05 NOTE — ASSESSMENT & PLAN NOTE
Patient's anemia is currently controlled. Has not received any PRBCs to date. Etiology likely d/t chronic disease due to ESRD  Current CBC reviewed-   Lab Results   Component Value Date    HGB 6.2 (L) 04/05/2024    HCT 19.5 (LL) 04/05/2024   Hemoglobin 7.0-6.2 likely due to CKD and I and D. 1 unit packed red blood cell with dialysis.

## 2024-04-05 NOTE — ASSESSMENT & PLAN NOTE
PT/OT evaluation completed and recommended moderate intensity therapy  Case management will assist

## 2024-04-05 NOTE — ASSESSMENT & PLAN NOTE
Continue holding as patient may need repeat procedure.  Heparin for DVT prophylaxis.  -restart apixaban once cleared by Orthopedic surgery

## 2024-04-05 NOTE — PROGRESS NOTES
[facility]  Progress Note    Patient Name: Miguel Moore  MRN: [id number]  Patient Class: IP- Inpatient   Admission Date: 3/20/2024  Length of Stay: 16 days  Attending Physician: Pepe Martino DO  Primary Care Provider: Raciel Raymond MD        Subjective:     Principal Problem:Cellulitis of right foot        HPI:  Mr Miguel Moore is a 68 y.o. man with ESRD on HD, HTN, DM with neuropathy, history of CVA with residual R sided weakness, chronic DVT on eliquis who presented with feeling poorly. He attended his usual dialysis session on Monday 3/18 without issues. He developed fatigue and R ankle swelling. He has some pain with palpation but is able to walk. No reports of trauma. No wounds. No falls. No history of gout. Today he was feeling worse so did not go to dialysis and came to the ED. No fevers. No shortness of breath. Normal appetite. No nausea, vomiting. No chest pain. No palpitations.     In the ED, afebrile with HR initially controlled then to 130s Afib RVR. Started diltiazem but became hypotensive. Diltiazem stopped. Given antibiotics. Admitted for further management.     Overview/Hospital Course:  Mr Miguel Moore is a 68 y.o. man with ESRD on HD, DM who was admitted with new onset Afib RVR, hyperkalemia from missed HD session, and R ankle cellulitis. Started amio gtt with conversion to NSR. TTE EF 55-60%, mild LAE. Cardiology consulted. Now on PO amiodarone and continues home eliquis (on this for chronic DVT). He received HD with resolution of hyperkalemia. He is on CTX for his R ankle cellulitis and was given colchicine in case gout could contribute. Also added vanc,foot swelling is improving.He developed recurrent Afib and amio gtt resumed. Cardiology was planning SONY/DCCV on 3/22. But converted to sinus. Transferred to floor. Right ankle cellulitis appears to be improving. Started spiking fevers on 3/27 up to 102.7 F. Blood cultures no growth. Vanc/Ceftriaxone  changed to Meropenem. CT right ankle with no obvious acute infection, pain is improving. Right forearm with swelling, u/s negative for abscess or hematoma. Blood cultures negative. Possible medication related fevers and ABx's stopped. Right ankle wound then had new abscess/discharge sp I& D with wound vac placement by Orthopedic Surgery on 4/4/24. Continue to hold eliquis until clear by Orthopedic Surgery. Follow up operative cultures. Continue vanc and cefepine.  Hemoglobin 7.0-6.2 likely due to CKD and I and D. 1 unit packed red blood cell with dialysis.        Interval History: denies sob, lives with girlfriend and daughter    Review of Systems   HENT:  Negative for ear discharge and ear pain.    Eyes:  Negative for discharge and itching.   Endocrine: Negative for cold intolerance and heat intolerance.   Skin:  Positive for wound.   Neurological:  Negative for seizures and syncope.     Objective:     Vital Signs (Most Recent):  Temp: 98.4 °F (36.9 °C) (04/05/24 0702)  Pulse: 80 (04/05/24 1129)  Resp: 18 (04/05/24 1129)  BP: (!) 126/55 (04/05/24 1129)  SpO2: 95 % (04/05/24 1129) Vital Signs (24h Range):  Temp:  [98.4 °F (36.9 °C)-101.5 °F (38.6 °C)] 98.4 °F (36.9 °C)  Pulse:  [60-83] 80  Resp:  [14-20] 18  SpO2:  [92 %-97 %] 95 %  BP: (106-135)/(52-69) 126/55     Weight: 90.4 kg (199 lb 4.7 oz)  Body mass index is 30.3 kg/m².    Intake/Output Summary (Last 24 hours) at 4/5/2024 1138  Last data filed at 4/5/2024 0830  Gross per 24 hour   Intake 458.09 ml   Output 125 ml   Net 333.09 ml         Physical Exam  Vitals and nursing note reviewed.   Constitutional:       General: He is not in acute distress.     Appearance: He is ill-appearing.   Cardiovascular:      Rate and Rhythm: Normal rate.      Pulses: Normal pulses.   Pulmonary:      Effort: Pulmonary effort is normal. No respiratory distress.      Breath sounds: No wheezing.   Abdominal:      General: Bowel sounds are normal.   Musculoskeletal:         General:  Swelling present.      Comments: Lower extremities with swelling significantly improved. Right arm swelling but no induration, erythema or increased warmth.    Skin:     General: Skin is warm and dry.      Comments: 4/4/2024 RLE wound with small opening with slight purulent discharge.  4/5/2024 sp I&D  RLE with wound vac    Neurological:      Mental Status: He is alert. Mental status is at baseline.   Psychiatric:         Mood and Affect: Mood normal.         Thought Content: Thought content normal.             Significant Labs: All pertinent labs within the past 24 hours have been reviewed.    Significant Imaging: I have reviewed all pertinent imaging results/findings within the past 24 hours.    Assessment/Plan:      * Cellulitis/Abscess  of right foot  R ankle pain, swelling, warmth present. Potential gout vs cellulitis. 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 No fracture present. Uric acid normal. Arterial US and venous US with no clots, appropriate flow  - on ceftriaxone/vanc for 7 days and still with pain and now with increased WBC and fevers  - blood cultures currently NGTD  - CT right ankle with no evidence of osteo.  - I do not believe this is source of recurrent fevers at this time  ABX's stopped.  -Wound now with small opening with slight purulent discharge.    -Right ankle wound then had new abscess/discharge sp I& D with wound vac placement by Orthopedic Surgery on 4/4/24. Continue to hold eliquis until clear by Orthopedic Surgery. Follow up operative cultures. Continue vanc and cefepine.      Fever  Spiked fever on 3/27 despite being on antibiotics for 5 days for right ankle cellulitis. ID consulted. CT ankle with no obvious abscess. Possibly right arm from infiltration. Blood cultures negative to date. U/s of right forearm with no hematoma/abscess - no erythema/warmth.  - On meropenem/vancomycin  - ID following  - unclear etiology at this time  ABX's stopped.  IV's  removed.  Repeat BCx.  CT chest/abd/pelvis with no obvious source of infection.  RLE wound with small opening and purulent discharge.  Restarted Vanc and Ortho consulted for possible I&D.  -see above #1      Open wound        Paroxysmal atrial fibrillation with RVR  Patient has Paroxysmal (<7 days) atrial fibrillation which is uncontrolled. Patient is currently in atrial fibrillation.  - new onset Afib  - on labetalol for BP at home, eliquis for chronic DVT but did miss some doses of eliquis  - in ED given diltiazem gtt but that caused hypotension  - EKG with Afib RVR with normal Qtc  - started amiodarone with conversion to NSR. Changed to PO amiodarone  - however, he had recurrent Afib and was placed back on amio gtt.   - Cardiology consulted  - plan for SONY/DCCV on 3/22/24 but converted to sinus rhythm. Has been in/out of afib but rate controlled  - continue home eliquis  Hold Eliquis for I&D and possible future procedures.    Lab Results   Component Value Date    TSH 2.672 03/20/2024     - TTE normal EF, mild LAE    [id number] - VASc score:  Congestive Heart Failure or EF < 35% NO   Hypertension YES  +1   Age >= 75 NO   Diabetes Mellitus YES  +1   Stroke/TIA prior history YES  +2   Vascular disease (PAD, MI or Aortic Plaque)? YES  +1   Age 65 - 74 YES  +1   Female NO   Total 6   Sinus at this time.      Anemia in chronic kidney disease  Patient's anemia is currently controlled. Has not received any PRBCs to date. Etiology likely d/t chronic disease due to ESRD  Current CBC reviewed-   Lab Results   Component Value Date    HGB 6.2 (L) 04/05/2024    HCT 19.5 (LL) 04/05/2024   Hemoglobin 7.0-6.2 likely due to CKD and I and D. 1 unit packed red blood cell with dialysis.      Chronic deep vein thrombosis (DVT) of popliteal vein of right lower extremity 9/21/20 outside US; unprovoked; anticoagulation  Continue holding as patient may need repeat procedure.  Heparin for DVT prophylaxis.  -restart apixaban once cleared by  Orthopedic surgery      History of stroke  PT/OT evaluation completed and recommended moderate intensity therapy  Case management will assist    Secondary hyperparathyroidism of renal origin  Continue renvela       ESRD (end stage renal disease)  ESRD via LUE AVF. Last session 3/18/24. Missed 3/20/24 due to fatigue. Usually MWF  - hyperkalemic on admit. Does not appear volume overloaded.   - Nephrology consulted.  - continue MWF HD    Hemiplegia and hemiparesis following cerebral infarction affecting right dominant side  No signs of acute stroke. Does have RUE weakness.   - continue home asa, statin  - resume apixaban once cleared by Orthopedic surgery      Seizure disorder as sequela of cerebrovascular accident  Not currently on antiepileptics. No seizure activity reported. Monitor.       Controlled type 2 diabetes mellitus with chronic kidney disease on chronic dialysis, with long-term current use of insulin  A1c:   Lab Results   Component Value Date    HGBA1C 5.8 (H) 03/20/2024     Meds: SSI PRN to maintain goal 140-180  ADA renal diet, accuchecks, hypoglycemic protocol      Dyslipidemia  Continue statin      Benign essential HTN  Chronic. Latest blood pressure and vitals reviewed-     Temp:  [97.3 °F (36.3 °C)-101.5 °F (38.6 °C)]   Pulse:  [69-93]   Resp:  [14-19]   BP: (102-131)/(51-60)   SpO2:  [91 %-98 %] .   Home meds for hypertension were reviewed and noted below.   Hypertension Medications               amLODIPine (NORVASC) 10 MG tablet Take 1 tablet by mouth once daily    labetaloL (NORMODYNE) 100 MG tablet Take 1 tablet (100 mg total) by mouth once daily.          Continue current management.    Will utilize p.r.n. blood pressure medication only if patient's blood pressure greater than 180/110 and he develops symptoms such as worsening chest pain or shortness of breath.      VTE Risk Mitigation (From admission, onward)           Ordered     heparin (porcine) injection 5,000 Units  Every 12 hours          04/04/24 1602                    Discharge Planning   GARY:      Code Status: Full Code   Is the patient medically ready for discharge?:     Reason for patient still in hospital (select all that apply): Treatment  Discharge Plan A: Home Health   Discharge Delays: None known at this time        Beverly Tong NP  Department of Hospital Medicine   [facility]

## 2024-04-05 NOTE — ASSESSMENT & PLAN NOTE
Spiked fever on 3/27 despite being on antibiotics for 5 days for right ankle cellulitis. ID consulted. CT ankle with no obvious abscess. Possibly right arm from infiltration. Blood cultures negative to date. U/s of right forearm with no hematoma/abscess - no erythema/warmth.  - On meropenem/vancomycin  - ID following  - unclear etiology at this time  ABX's stopped.  IV's removed.  Repeat BCx.  CT chest/abd/pelvis with no obvious source of infection.  RLE wound with small opening and purulent discharge.  Restarted Vanc and Ortho consulted for possible I&D.  -see above #1

## 2024-04-06 ENCOUNTER — ANESTHESIA (OUTPATIENT)
Dept: SURGERY | Facility: HOSPITAL | Age: 69
DRG: 264 | End: 2024-04-06
Payer: MEDICARE

## 2024-04-06 LAB
ALBUMIN SERPL BCP-MCNC: 1.4 G/DL (ref 3.5–5.2)
ALP SERPL-CCNC: 104 U/L (ref 55–135)
ALT SERPL W/O P-5'-P-CCNC: 12 U/L (ref 10–44)
ANION GAP SERPL CALC-SCNC: 12 MMOL/L (ref 8–16)
AST SERPL-CCNC: 27 U/L (ref 10–40)
BACTERIA SPEC AEROBE CULT: ABNORMAL
BACTERIA SPEC AEROBE CULT: ABNORMAL
BASOPHILS # BLD AUTO: 0.07 K/UL (ref 0–0.2)
BASOPHILS NFR BLD: 0.5 % (ref 0–1.9)
BILIRUB SERPL-MCNC: 0.8 MG/DL (ref 0.1–1)
BLD PROD TYP BPU: NORMAL
BLOOD UNIT EXPIRATION DATE: NORMAL
BLOOD UNIT TYPE CODE: 6200
BLOOD UNIT TYPE: NORMAL
BUN SERPL-MCNC: 25 MG/DL (ref 8–23)
CALCIUM SERPL-MCNC: 7.2 MG/DL (ref 8.7–10.5)
CHLORIDE SERPL-SCNC: 99 MMOL/L (ref 95–110)
CO2 SERPL-SCNC: 23 MMOL/L (ref 23–29)
CODING SYSTEM: NORMAL
CREAT SERPL-MCNC: 5.6 MG/DL (ref 0.5–1.4)
CROSSMATCH INTERPRETATION: NORMAL
DIFFERENTIAL METHOD BLD: ABNORMAL
DISPENSE STATUS: NORMAL
EOSINOPHIL # BLD AUTO: 0.1 K/UL (ref 0–0.5)
EOSINOPHIL NFR BLD: 0.4 % (ref 0–8)
ERYTHROCYTE [DISTWIDTH] IN BLOOD BY AUTOMATED COUNT: 20.5 % (ref 11.5–14.5)
EST. GFR  (NO RACE VARIABLE): 10 ML/MIN/1.73 M^2
GLUCOSE SERPL-MCNC: 173 MG/DL (ref 70–110)
HBV SURFACE AB SER QL IA: POSITIVE
HBV SURFACE AB SERPL IA-ACNC: 63 MIU/ML
HCT VFR BLD AUTO: 21.3 % (ref 40–54)
HGB BLD-MCNC: 6.8 G/DL (ref 14–18)
HGB BLD-MCNC: 8.7 G/DL (ref 14–18)
IMM GRANULOCYTES # BLD AUTO: 0.18 K/UL (ref 0–0.04)
IMM GRANULOCYTES NFR BLD AUTO: 1.2 % (ref 0–0.5)
LYMPHOCYTES # BLD AUTO: 1.1 K/UL (ref 1–4.8)
LYMPHOCYTES NFR BLD: 6.9 % (ref 18–48)
MCH RBC QN AUTO: 25.4 PG (ref 27–31)
MCHC RBC AUTO-ENTMCNC: 31.9 G/DL (ref 32–36)
MCV RBC AUTO: 80 FL (ref 82–98)
MONOCYTES # BLD AUTO: 1.1 K/UL (ref 0.3–1)
MONOCYTES NFR BLD: 6.8 % (ref 4–15)
NEUTROPHILS # BLD AUTO: 13.1 K/UL (ref 1.8–7.7)
NEUTROPHILS NFR BLD: 84.2 % (ref 38–73)
NRBC BLD-RTO: 0 /100 WBC
PLATELET # BLD AUTO: 244 K/UL (ref 150–450)
PMV BLD AUTO: 9.7 FL (ref 9.2–12.9)
POCT GLUCOSE: 173 MG/DL (ref 70–110)
POCT GLUCOSE: 182 MG/DL (ref 70–110)
POCT GLUCOSE: 198 MG/DL (ref 70–110)
POCT GLUCOSE: 219 MG/DL (ref 70–110)
POTASSIUM SERPL-SCNC: 3.7 MMOL/L (ref 3.5–5.1)
PROT SERPL-MCNC: 4.7 G/DL (ref 6–8.4)
RBC # BLD AUTO: 2.68 M/UL (ref 4.6–6.2)
SODIUM SERPL-SCNC: 134 MMOL/L (ref 136–145)
TRANS ERYTHROCYTES VOL PATIENT: NORMAL ML
WBC # BLD AUTO: 15.51 K/UL (ref 3.9–12.7)

## 2024-04-06 PROCEDURE — 36415 COLL VENOUS BLD VENIPUNCTURE: CPT | Mod: HCNC | Performed by: ORTHOPAEDIC SURGERY

## 2024-04-06 PROCEDURE — D9220A PRA ANESTHESIA: Mod: ANES,,, | Performed by: ANESTHESIOLOGY

## 2024-04-06 PROCEDURE — 37000009 HC ANESTHESIA EA ADD 15 MINS: Mod: HCNC | Performed by: ORTHOPAEDIC SURGERY

## 2024-04-06 PROCEDURE — 25000003 PHARM REV CODE 250: Mod: HCNC | Performed by: NURSE ANESTHETIST, CERTIFIED REGISTERED

## 2024-04-06 PROCEDURE — 63600175 PHARM REV CODE 636 W HCPCS: Mod: HCNC | Performed by: ORTHOPAEDIC SURGERY

## 2024-04-06 PROCEDURE — 80053 COMPREHEN METABOLIC PANEL: CPT | Mod: HCNC | Performed by: ORTHOPAEDIC SURGERY

## 2024-04-06 PROCEDURE — 36000705 HC OR TIME LEV I EA ADD 15 MIN: Mod: HCNC | Performed by: ORTHOPAEDIC SURGERY

## 2024-04-06 PROCEDURE — 71000033 HC RECOVERY, INTIAL HOUR: Mod: HCNC | Performed by: ORTHOPAEDIC SURGERY

## 2024-04-06 PROCEDURE — 37000008 HC ANESTHESIA 1ST 15 MINUTES: Mod: HCNC | Performed by: ORTHOPAEDIC SURGERY

## 2024-04-06 PROCEDURE — 25000003 PHARM REV CODE 250: Mod: HCNC | Performed by: ORTHOPAEDIC SURGERY

## 2024-04-06 PROCEDURE — 36415 COLL VENOUS BLD VENIPUNCTURE: CPT | Mod: HCNC | Performed by: NURSE PRACTITIONER

## 2024-04-06 PROCEDURE — 94761 N-INVAS EAR/PLS OXIMETRY MLT: CPT | Mod: HCNC

## 2024-04-06 PROCEDURE — 63600175 PHARM REV CODE 636 W HCPCS: Mod: HCNC | Performed by: ANESTHESIOLOGY

## 2024-04-06 PROCEDURE — 85025 COMPLETE CBC W/AUTO DIFF WBC: CPT | Mod: HCNC | Performed by: ORTHOPAEDIC SURGERY

## 2024-04-06 PROCEDURE — P9021 RED BLOOD CELLS UNIT: HCPCS | Mod: HCNC | Performed by: NURSE PRACTITIONER

## 2024-04-06 PROCEDURE — 85018 HEMOGLOBIN: CPT | Mod: HCNC | Performed by: NURSE PRACTITIONER

## 2024-04-06 PROCEDURE — D9220A PRA ANESTHESIA: Mod: CRNA,,, | Performed by: NURSE ANESTHETIST, CERTIFIED REGISTERED

## 2024-04-06 PROCEDURE — 86920 COMPATIBILITY TEST SPIN: CPT | Mod: HCNC | Performed by: NURSE PRACTITIONER

## 2024-04-06 PROCEDURE — 0JDQ0ZZ EXTRACTION OF RIGHT FOOT SUBCUTANEOUS TISSUE AND FASCIA, OPEN APPROACH: ICD-10-PCS | Performed by: ORTHOPAEDIC SURGERY

## 2024-04-06 PROCEDURE — 36000704 HC OR TIME LEV I 1ST 15 MIN: Mod: HCNC | Performed by: ORTHOPAEDIC SURGERY

## 2024-04-06 PROCEDURE — 27201423 OPTIME MED/SURG SUP & DEVICES STERILE SUPPLY: Mod: HCNC | Performed by: ORTHOPAEDIC SURGERY

## 2024-04-06 PROCEDURE — 21400001 HC TELEMETRY ROOM: Mod: HCNC

## 2024-04-06 PROCEDURE — 63600175 PHARM REV CODE 636 W HCPCS: Mod: HCNC | Performed by: NURSE ANESTHETIST, CERTIFIED REGISTERED

## 2024-04-06 PROCEDURE — 27000221 HC OXYGEN, UP TO 24 HOURS: Mod: HCNC

## 2024-04-06 RX ORDER — ONDANSETRON HYDROCHLORIDE 2 MG/ML
INJECTION, SOLUTION INTRAVENOUS
Status: DISCONTINUED | OUTPATIENT
Start: 2024-04-06 | End: 2024-04-06

## 2024-04-06 RX ORDER — SODIUM CHLORIDE, SODIUM LACTATE, POTASSIUM CHLORIDE, CALCIUM CHLORIDE 600; 310; 30; 20 MG/100ML; MG/100ML; MG/100ML; MG/100ML
INJECTION, SOLUTION INTRAVENOUS CONTINUOUS
Status: DISCONTINUED | OUTPATIENT
Start: 2024-04-06 | End: 2024-04-06

## 2024-04-06 RX ORDER — FENTANYL CITRATE 50 UG/ML
INJECTION, SOLUTION INTRAMUSCULAR; INTRAVENOUS
Status: DISCONTINUED | OUTPATIENT
Start: 2024-04-06 | End: 2024-04-06

## 2024-04-06 RX ORDER — SODIUM CHLORIDE 9 MG/ML
INJECTION, SOLUTION INTRAVENOUS ONCE
Status: DISCONTINUED | OUTPATIENT
Start: 2024-04-06 | End: 2024-04-19 | Stop reason: HOSPADM

## 2024-04-06 RX ORDER — PROPOFOL 10 MG/ML
VIAL (ML) INTRAVENOUS
Status: DISCONTINUED | OUTPATIENT
Start: 2024-04-06 | End: 2024-04-06

## 2024-04-06 RX ORDER — SODIUM CHLORIDE 0.9 % (FLUSH) 0.9 %
10 SYRINGE (ML) INJECTION
Status: DISCONTINUED | OUTPATIENT
Start: 2024-04-06 | End: 2024-04-06 | Stop reason: HOSPADM

## 2024-04-06 RX ORDER — LIDOCAINE HYDROCHLORIDE 20 MG/ML
INJECTION INTRAVENOUS
Status: DISCONTINUED | OUTPATIENT
Start: 2024-04-06 | End: 2024-04-06

## 2024-04-06 RX ORDER — PROPOFOL 10 MG/ML
VIAL (ML) INTRAVENOUS CONTINUOUS PRN
Status: DISCONTINUED | OUTPATIENT
Start: 2024-04-06 | End: 2024-04-06

## 2024-04-06 RX ORDER — LIDOCAINE HYDROCHLORIDE 10 MG/ML
1 INJECTION, SOLUTION EPIDURAL; INFILTRATION; INTRACAUDAL; PERINEURAL ONCE
Status: DISCONTINUED | OUTPATIENT
Start: 2024-04-06 | End: 2024-04-06 | Stop reason: HOSPADM

## 2024-04-06 RX ORDER — HYDROMORPHONE HYDROCHLORIDE 2 MG/ML
0.2 INJECTION, SOLUTION INTRAMUSCULAR; INTRAVENOUS; SUBCUTANEOUS EVERY 5 MIN PRN
Status: DISCONTINUED | OUTPATIENT
Start: 2024-04-06 | End: 2024-04-06 | Stop reason: HOSPADM

## 2024-04-06 RX ORDER — HYDROCODONE BITARTRATE AND ACETAMINOPHEN 500; 5 MG/1; MG/1
TABLET ORAL
Status: DISCONTINUED | OUTPATIENT
Start: 2024-04-06 | End: 2024-04-15

## 2024-04-06 RX ADMIN — HEPARIN SODIUM 5000 UNITS: 5000 INJECTION INTRAVENOUS; SUBCUTANEOUS at 10:04

## 2024-04-06 RX ADMIN — HYDROMORPHONE HYDROCHLORIDE 0.5 MG: 1 INJECTION, SOLUTION INTRAMUSCULAR; INTRAVENOUS; SUBCUTANEOUS at 04:04

## 2024-04-06 RX ADMIN — AMIODARONE HYDROCHLORIDE 400 MG: 200 TABLET ORAL at 09:04

## 2024-04-06 RX ADMIN — ATORVASTATIN CALCIUM 80 MG: 40 TABLET, FILM COATED ORAL at 09:04

## 2024-04-06 RX ADMIN — PROPOFOL 10 MG: 10 INJECTION, EMULSION INTRAVENOUS at 10:04

## 2024-04-06 RX ADMIN — FENTANYL CITRATE 50 MCG: 50 INJECTION, SOLUTION INTRAMUSCULAR; INTRAVENOUS at 10:04

## 2024-04-06 RX ADMIN — HYDROMORPHONE HYDROCHLORIDE 0.2 MG: 2 INJECTION INTRAMUSCULAR; INTRAVENOUS; SUBCUTANEOUS at 11:04

## 2024-04-06 RX ADMIN — FINASTERIDE 5 MG: 5 TABLET, FILM COATED ORAL at 09:04

## 2024-04-06 RX ADMIN — LIDOCAINE HYDROCHLORIDE 60 MG: 20 INJECTION, SOLUTION INTRAVENOUS at 10:04

## 2024-04-06 RX ADMIN — HYDROMORPHONE HYDROCHLORIDE 0.5 MG: 1 INJECTION, SOLUTION INTRAMUSCULAR; INTRAVENOUS; SUBCUTANEOUS at 07:04

## 2024-04-06 RX ADMIN — AMIODARONE HYDROCHLORIDE 400 MG: 200 TABLET ORAL at 10:04

## 2024-04-06 RX ADMIN — INSULIN ASPART 1 UNITS: 100 INJECTION, SOLUTION INTRAVENOUS; SUBCUTANEOUS at 10:04

## 2024-04-06 RX ADMIN — NEPHROCAP 1 CAPSULE: 1 CAP ORAL at 09:04

## 2024-04-06 RX ADMIN — ONDANSETRON 4 MG: 2 INJECTION, SOLUTION INTRAMUSCULAR; INTRAVENOUS at 10:04

## 2024-04-06 RX ADMIN — TAMSULOSIN HYDROCHLORIDE 0.8 MG: 0.4 CAPSULE ORAL at 09:04

## 2024-04-06 RX ADMIN — CEFEPIME 1 G: 1 INJECTION, POWDER, FOR SOLUTION INTRAMUSCULAR; INTRAVENOUS at 10:04

## 2024-04-06 RX ADMIN — SODIUM CHLORIDE: 0.9 INJECTION, SOLUTION INTRAVENOUS at 10:04

## 2024-04-06 RX ADMIN — PROPOFOL 50 MCG/KG/MIN: 10 INJECTION, EMULSION INTRAVENOUS at 10:04

## 2024-04-06 RX ADMIN — HYDROMORPHONE HYDROCHLORIDE 0.5 MG: 1 INJECTION, SOLUTION INTRAMUSCULAR; INTRAVENOUS; SUBCUTANEOUS at 01:04

## 2024-04-06 NOTE — PLAN OF CARE
Problem: Adult Inpatient Plan of Care  Goal: Absence of Hospital-Acquired Illness or Injury  Outcome: Ongoing, Progressing     Problem: Diabetes Comorbidity  Goal: Blood Glucose Level Within Targeted Range  Outcome: Ongoing, Progressing     Problem: Device-Related Complication Risk (Hemodialysis)  Goal: Safe, Effective Therapy Delivery  Outcome: Ongoing, Progressing     Problem: Fall Injury Risk  Goal: Absence of Fall and Fall-Related Injury  Outcome: Ongoing, Progressing

## 2024-04-06 NOTE — NURSING
[facility], [facility]  Nurses Note -- 4 Eyes      4/6/2024       Skin assessed on: Q Shift      [] No Pressure Injuries Present    []Prevention Measures Documented    [x] Yes LDA  for Pressure Injury Previously documented     [] Yes New Pressure Injury Discovered   [] LDA for New Pressure Injury Added      Attending RN:  Olivia Crowley, RN     Second RN:  Ben Pina

## 2024-04-06 NOTE — SUBJECTIVE & OBJECTIVE
Interval History: Patient seen after OR I &D     Review of Systems   HENT:  Negative for ear discharge and ear pain.    Eyes:  Negative for discharge and itching.   Endocrine: Negative for cold intolerance, heat intolerance and polydipsia.   Skin:  Positive for wound.   Neurological:  Negative for seizures and syncope.     Objective:     Vital Signs (Most Recent):  Temp: 97.7 °F (36.5 °C) (04/06/24 1203)  Pulse: 74 (04/06/24 1203)  Resp: 18 (04/06/24 1310)  BP: (!) 125/58 (04/06/24 1203)  SpO2: 96 % (04/06/24 1300) Vital Signs (24h Range):  Temp:  [97.7 °F (36.5 °C)-102.7 °F (39.3 °C)] 97.7 °F (36.5 °C)  Pulse:  [62-88] 74  Resp:  [11-18] 18  SpO2:  [92 %-100 %] 96 %  BP: ()/(48-63) 125/58     Weight: 90.4 kg (199 lb 4.7 oz)  Body mass index is 30.3 kg/m².    Intake/Output Summary (Last 24 hours) at 4/6/2024 1515  Last data filed at 4/6/2024 1101  Gross per 24 hour   Intake 580 ml   Output 0 ml   Net 580 ml         Physical Exam  Vitals and nursing note reviewed.   Constitutional:       General: He is not in acute distress.     Appearance: He is ill-appearing.   Cardiovascular:      Rate and Rhythm: Normal rate.      Pulses: Normal pulses.   Pulmonary:      Effort: Pulmonary effort is normal. No respiratory distress.      Breath sounds: No wheezing.   Abdominal:      General: Bowel sounds are normal.   Musculoskeletal:         General: Swelling present.      Comments: Lower extremities with swelling significantly improved. Right arm swelling but no induration, erythema or increased warmth.    Skin:     General: Skin is warm and dry.      Comments: RLE wrapped and wound vac in place. Draining bloody material. Pain 7/10 .   RLE edema (baseline right hemiplegia and paresthesia from stroke).     Neurological:      Mental Status: He is alert. Mental status is at baseline.   Psychiatric:         Mood and Affect: Mood normal.         Thought Content: Thought content normal.             Significant Labs: All pertinent  labs within the past 24 hours have been reviewed.    Significant Imaging: I have reviewed all pertinent imaging results/findings within the past 24 hours.

## 2024-04-06 NOTE — OP NOTE
[facility]  Surgery Department  Operative Note    SUMMARY     Date of Procedure: 4/6/2024     Procedure: Procedure(s) (LRB):  INCISION AND DRAINAGE, LOWER EXTREMITY (Right)     Surgeon(s) and Role:     * Desmond Barillas MD - Primary    Assisting Surgeon: None    Pre-Operative Diagnosis: Abscess right ankle/ foot [L03.115]    Post-Operative Diagnosis: Post-Op Diagnosis Codes:     * Cellulitis of right foot [L03.115] Rt ankle abscess    Anesthesia: Choice    Technical Procedures Used: I&D    Description of the Findings of the Procedure: no pus, some necrotic tissue    Significant Surgical Tasks Conducted by the Assistant(s), if Applicable: none    Complications: No    Estimated Blood Loss (EBL): 50ml           Implants: * No implants in log *  Wound vac exchanged    Specimens:   Specimen (24h ago, onward)      None                    Condition: Good    Disposition: PACU - hemodynamically stable.    Attestation: I performed the procedure.    After proper consents were obtained, patient was taken to the operating room and administered general anesthesia.  Right lower extremity was then prepped and draped in normal sterile fashion.  Limb was elevated and tourniquet was inflated.  Some necrotic appearing skin was present at about a dime size area and a nickel size area at the posterolateral ankle.  Epidermal layer was removed and some friable subcutaneous tissue and fascial tissue were then removed.  This area communicated with the rest of the dead space and wound.  Some necrotic tissue was noted along the lateral aspect of the distal fibula and was removed as well.  Some subcutaneous tissue was also removed.  Hemostasis was then achieved.  3 L normal saline with Betadine were irrigated through the wound.  Small wound VAC sponge was then inserted in its entirety into the wound.  Wound VAC dressing was placed and was noted to be functioning appropriately.  Patient was aroused in the operating room and  transferred to recovery in stable condition

## 2024-04-06 NOTE — NURSING
[facility] [place]es [place] -- 4 Eyes      4/5/2024       Skin assessed on: Q Shift      [] No Pressure Injuries Present    []Prevention Measures Documented    [x] Yes LDA  for Pressure Injury Previously documented     [] Yes New Pressure Injury Discovered   [] LDA for New Pressure Injury Added      Attending RN:  Ben Pina RN     Second RN:  DARRION Maloney

## 2024-04-06 NOTE — ANESTHESIA POSTPROCEDURE EVALUATION
Anesthesia Post Evaluation    Patient: Miguel Moore    Procedure(s) Performed: Procedure(s) (LRB):  INCISION AND DRAINAGE, LOWER EXTREMITY (Right)    Final Anesthesia Type: MAC      Patient location during evaluation: PACU  Patient participation: Yes- Able to Participate  Level of consciousness: awake and alert and oriented  Post-procedure vital signs: reviewed and stable  Pain management: adequate  Airway patency: patent    PONV status at discharge: No PONV  Anesthetic complications: no      Cardiovascular status: blood pressure returned to baseline, hemodynamically stable and stable  Respiratory status: unassisted, spontaneous ventilation and room air  Hydration status: euvolemic  Follow-up not needed.              Vitals Value Taken Time   /58 04/06/24 1203   Temp 36.5 °C (97.7 °F) 04/06/24 1203   Pulse 74 04/06/24 1203   Resp 16 04/06/24 1203   SpO2 95 % 04/06/24 1203         Event Time   Out of Recovery 04/06/2024 11:44:06         Pain/Skyler Score: Pain Rating Prior to Med Admin: 4 (4/6/2024 11:32 AM)  Pain Rating Post Med Admin: 4 (4/6/2024 11:32 AM)  Skyler Score: 9 (4/6/2024 11:32 AM)

## 2024-04-06 NOTE — ASSESSMENT & PLAN NOTE
Patient has Paroxysmal (<7 days) atrial fibrillation which is uncontrolled. Patient is currently in atrial fibrillation.  - new onset Afib  - on labetalol for BP at home, eliquis for chronic DVT but did miss some doses of eliquis  - in ED given diltiazem gtt but that caused hypotension  - EKG with Afib RVR with normal Qtc  - started amiodarone with conversion to NSR. Changed to PO amiodarone  - however, he had recurrent Afib and was placed back on amio gtt.   - Cardiology consulted  - plan for SONY/DCCV on 3/22/24 but converted to sinus rhythm. Has been in/out of afib but rate controlled  - continue home eliquis  Hold Eliquis for I&D and possible future procedures.    Lab Results   Component Value Date    TSH 2.672 03/20/2024   - TTE normal EF, mild LAE    [id number] - VASc score:  Congestive Heart Failure or EF < 35% NO   Hypertension YES  +1   Age >= 75 NO   Diabetes Mellitus YES  +1   Stroke/TIA prior history YES  +2   Vascular disease (PAD, MI or Aortic Plaque)? YES  +1   Age 65 - 74 YES  +1   Female NO   Total 6   Sinus at this time.

## 2024-04-06 NOTE — ANESTHESIA PREPROCEDURE EVALUATION
04/06/2024  Miguel Moore is a 68 y.o., male.    Pre-operative evaluation for Procedure(s) (LRB):  INCISION AND DRAINAGE, LOWER EXTREMITY (Right)    Miguel Moore is a 68 y.o. male     Patient Active Problem List   Diagnosis    Benign essential HTN    Dyslipidemia    Controlled type 2 diabetes mellitus with chronic kidney disease on chronic dialysis, with long-term current use of insulin    BPH (benign prostatic hyperplasia) 2/18/21 prostate bx normal    Seizure disorder as sequela of cerebrovascular accident    Hemiplegia and hemiparesis following cerebral infarction affecting right dominant side    Microcytosis 9/2019 labs c/w AoCD and AoCKD    ESRD (end stage renal disease)    Nuclear sclerosis, left    Cortical cataract of both eyes    DM type 2 without retinopathy    Secondary hyperparathyroidism of renal origin    Vitamin D deficiency    Right sided weakness    Senile nuclear sclerosis    CME (cystoid macular edema), bilateral    Refractive error    History of stroke    Type 2 diabetes mellitus with diabetic neuropathy, with long-term current use of insulin    Nephrotic syndrome    History of fungal infection candida parasilosis hospitalization 8/2020    Chronic deep vein thrombosis (DVT) of popliteal vein of right lower extremity 9/21/20 outside US; unprovoked; anticoagulation    Pulmonary nodule 1/2021 4 mm nodule.    Elevated PSA 2/18/21 prostate bx normal    Anemia in chronic kidney disease    History of diabetic ulcer of foot    Pseudophakia    Bilateral posterior capsular opacification    Paroxysmal atrial fibrillation with RVR    Cellulitis/Abscess  of right foot    Open wound    Fever       Review of patient's allergies indicates:   Allergen Reactions    Ace inhibitors      Hyperkalemia 8/2018  Other reaction(s): Unknown    Penicillins Hives     Tolerated cefepime and cefdinir previously     Tizanidine      Felt hot       No current facility-administered medications on file prior to encounter.     Current Outpatient Medications on File Prior to Encounter   Medication Sig Dispense Refill    amLODIPine (NORVASC) 10 MG tablet Take 1 tablet by mouth once daily 90 tablet 3    apixaban (ELIQUIS) 5 mg Tab Take 1 tablet (5 mg total) by mouth 2 (two) times daily. 180 tablet 3    aspirin (ECOTRIN) 81 MG EC tablet Take 1 tablet (81 mg total) by mouth once daily. 90 tablet 3    atorvastatin (LIPITOR) 80 MG tablet Take 1 tablet (80 mg total) by mouth once daily. 90 tablet 3    finasteride (PROSCAR) 5 mg tablet Take 1 tablet (5 mg total) by mouth once daily. 90 tablet 3    iron sucrose in NS (VENOFER) 100 mg/100 mL PgBk 50 mg.      labetaloL (NORMODYNE) 100 MG tablet Take 1 tablet (100 mg total) by mouth once daily. 90 tablet 3    methoxy peg-epoetin beta (MIRCERA INJ) 75 mcg.      mupirocin (BACTROBAN) 2 % ointment Apply to affected area 3 times daily 22 g 1    RENVELA 800 mg Tab Take 1 tablet (800 mg total) by mouth 3 (three) times daily with meals. 90 tablet 0    sildenafiL (VIAGRA) 100 MG tablet Take 1 tablet (100 mg total) by mouth daily as needed. 10 tablet 11    tamsulosin (FLOMAX) 0.4 mg Cap Take 2 capsules (0.8 mg total) by mouth once daily. 180 capsule 3       Past Surgical History:   Procedure Laterality Date    CATARACT EXTRACTION W/  INTRAOCULAR LENS IMPLANT Right 10/05/2017    Dr. Tay    CATARACT EXTRACTION W/  INTRAOCULAR LENS IMPLANT Left 10/19/2017    Dr. Tay    CYSTOSCOPY W/ RETROGRADES Bilateral 2/18/2021    Procedure: CYSTOSCOPY, WITH RETROGRADE PYELOGRAM;  Surgeon: JEMMA Styles MD;  Location: [facility];  Service: Urology;  Laterality: Bilateral;  REQUESTING EARLY AS POSSIBE-LO / 2/8/2021 @ 1:13PM  RN Pre Op 2-11-21, Covid NEGATIVE ON  2-17-21.  C A    ESOPHAGOGASTRODUODENOSCOPY N/A 8/17/2020    Procedure: EGD (ESOPHAGOGASTRODUODENOSCOPY);  Surgeon: Desmond Chapman MD;   "Location: [facility] ENDO;  Service: Endoscopy;  Laterality: N/A;    EYE SURGERY Bilateral     cataract    FEMORAL ARTERY STENT      FRACTURE SURGERY      INCISION AND DRAINAGE, LOWER EXTREMITY Right 2024    Procedure: INCISION AND DRAINAGE, LOWER EXTREMITY;  Surgeon: Jimbo Montilla III, MD;  Location: [facility] OR;  Service: Orthopedics;  Laterality: Right;    KNEE SURGERY      bilateral scope       Social History     Socioeconomic History    Marital status: Single   Occupational History    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, etc   Tobacco Use    Smoking status: Never    Smokeless tobacco: Never   Substance and Sexual Activity    Alcohol use: No    Drug use: No   Social History Narrative    Lives with daughter         CBC:   Recent Labs     24  0400 24  0631   WBC 13.99* 15.51*   RBC 2.54* 2.68*   HGB 6.2* 6.8*   HCT 19.5* 21.3*    244   MCV 77* 80*   MCH 24.4* 25.4*   MCHC 31.8* 31.9*       CMP:   Recent Labs     24  0349 24  0400   * 133*   K 3.7 3.9   CL 99 99   CO2 24 24   BUN 27* 39*   CREATININE 6.7* 8.5*   * 148*   CALCIUM 7.1* 7.4*   ALBUMIN 1.4* 1.4*   PROT 4.4* 4.3*   ALKPHOS 108 102   ALT 11 11   AST 24 22   BILITOT 1.0 0.7       INR  No results for input(s): "PT", "INR", "PROTIME", "APTT" in the last 72 hours.        Diagnostic Studies:      EKD Echo:  No results found for this or any previous visit.        Pre-op Assessment    I have reviewed the Patient Summary Reports.     I have reviewed the Nursing Notes. I have reviewed the NPO Status.   I have reviewed the Medications.     Review of Systems  Anesthesia Hx:  No problems with previous Anesthesia             Denies Family Hx of Anesthesia complications.    Denies Personal Hx of Anesthesia complications.                    Social:  Non-Smoker, No Alcohol Use       Hematology/Oncology:    Oncology Normal    -- Anemia:               Hematology Comments: Will transfuse 1 unit pRBC     "                EENT/Dental:  EENT/Dental Normal           Cardiovascular:     Hypertension    Dysrhythmias atrial fibrillation                                   Pulmonary:  Pulmonary Normal                       Renal/:  Chronic Renal Disease, ESRD                Hepatic/GI:  Hepatic/GI Normal                 Neurological:   CVA    Seizures                                Endocrine:  Diabetes, type 2           Psych:  Psychiatric Normal                    Physical Exam  General: Cooperative, Alert and Oriented    Airway:  Mallampati: II   Mouth Opening: Normal  TM Distance: Normal  Tongue: Normal  Neck ROM: Normal ROM    Dental:  Intact    Chest/Lungs:  Normal Respiratory Rate        Anesthesia Plan  Type of Anesthesia, risks & benefits discussed:    Anesthesia Type: MAC, Gen Natural Airway  Intra-op Monitoring Plan: Standard ASA Monitors  Induction:  IV  Informed Consent: Informed consent signed with the Patient and all parties understand the risks and agree with anesthesia plan.  All questions answered.   ASA Score: 3    Ready For Surgery From Anesthesia Perspective.     .

## 2024-04-06 NOTE — PLAN OF CARE
REASSESSMENT:    The patient went to HD today and received blood today.  Went OR yesterday 4/4 I&D of right leg wound now with a wound vac placed 3MKCI.  Had a decubitus to right buttock, seen by NP wound care nurse.    Barrier To DC: May go back to the OR.    Plan A:  Home with Adult daughter and home health.  Plan B:  SNF if patient changes his mind and if recommendations continues  Will need ortho appt and pcp appt at dc.  Home health.       04/05/24 0953   Discharge Reassessment   Assessment Type Discharge Planning Reassessment   Did the patient's condition or plan change since previous assessment? Yes   Discharge Plan discussed with: Patient   Discharge Plan A Home Health;Home with family   Discharge Plan B Skilled Nursing Facility  (patient preferrence to go home)   DME Needed Upon Discharge  none   Transition of Care Barriers None   Post-Acute Status   Post-Acute Authorization Home Health   Post-Acute Placement Status Patient declined/refused   Home Health Status Pending medical clearance/testing   Discharge Delays (!) Procedure Scheduling (IR, OR, Labs, Echo, Cath, Echo, EEG)

## 2024-04-06 NOTE — PROGRESS NOTES
[facility] Medicine  Progress Note    Patient Name: Miguel Moore  MRN: [id number]  Patient Class: IP- Inpatient   Admission Date: 3/20/2024  Length of Stay: 17 days  Attending Physician: Pepe Martino DO  Primary Care Provider: Raciel Raymond MD        Subjective:     Principal Problem:Cellulitis of right foot        HPI:  Mr Miguel Moore is a 68 y.o. man with ESRD on HD, HTN, DM with neuropathy, history of CVA with residual R sided weakness, chronic DVT on eliquis who presented with feeling poorly. He attended his usual dialysis session on Monday 3/18 without issues. He developed fatigue and R ankle swelling. He has some pain with palpation but is able to walk. No reports of trauma. No wounds. No falls. No history of gout. Today he was feeling worse so did not go to dialysis and came to the ED. No fevers. No shortness of breath. Normal appetite. No nausea, vomiting. No chest pain. No palpitations.     In the ED, afebrile with HR initially controlled then to 130s Afib RVR. Started diltiazem but became hypotensive. Diltiazem stopped. Given antibiotics. Admitted for further management.     Overview/Hospital Course:  Mr Miguel Moore is a 68 y.o. man with ESRD on HD, DM who was admitted with new onset Afib RVR, hyperkalemia from missed HD session, and R ankle cellulitis. Started amio gtt with conversion to NSR. TTE EF 55-60%, mild LAE. Cardiology consulted. Now on PO amiodarone and continues home eliquis (on this for chronic DVT). He received HD with resolution of hyperkalemia. He is on CTX for his R ankle cellulitis and was given colchicine in case gout could contribute. He developed recurrent Afib and amio gtt resumed. Cardiology was planning SONY/DCCV on 3/22. But converted to sinus. Transferred to floor 3/26.   Right ankle cellulitis improving however spiking fevers on 3/27 up to 102.7 F.  Vanc/Ceftriaxone changed to Meropenem. CT right ankle with no obvious acute infection, pain is  improving. Right forearm with swelling, u/s negative for abscess or hematoma. Blood cultures negative. Possible medication related fevers and ABx's stopped. 4/4-Right ankle wound then had new abscess/discharge sp I& D with wound vac placement by Orthopedic Surgery  4/5-Hemoglobin 7.0>6.2 likely due to CKD and I &D. 1 unit packed red blood cell with dialysis.    4/6-Repeat OR I&D and wound vac back on. OP wound culture grew e coli. Continue vanc and cefepine. Continue to hold eliquis until clear by Orthopedic Surgery. Hgb 6.8- Transfuse 1 unit PRBC today.       Interval History: Patient seen after OR I &D     Review of Systems   HENT:  Negative for ear discharge and ear pain.    Eyes:  Negative for discharge and itching.   Endocrine: Negative for cold intolerance, heat intolerance and polydipsia.   Skin:  Positive for wound.   Neurological:  Negative for seizures and syncope.     Objective:     Vital Signs (Most Recent):  Temp: 97.7 °F (36.5 °C) (04/06/24 1203)  Pulse: 74 (04/06/24 1203)  Resp: 18 (04/06/24 1310)  BP: (!) 125/58 (04/06/24 1203)  SpO2: 96 % (04/06/24 1300) Vital Signs (24h Range):  Temp:  [97.7 °F (36.5 °C)-102.7 °F (39.3 °C)] 97.7 °F (36.5 °C)  Pulse:  [62-88] 74  Resp:  [11-18] 18  SpO2:  [92 %-100 %] 96 %  BP: ()/(48-63) 125/58     Weight: 90.4 kg (199 lb 4.7 oz)  Body mass index is 30.3 kg/m².    Intake/Output Summary (Last 24 hours) at 4/6/2024 1515  Last data filed at 4/6/2024 1101  Gross per 24 hour   Intake 580 ml   Output 0 ml   Net 580 ml         Physical Exam  Vitals and nursing note reviewed.   Constitutional:       General: He is not in acute distress.     Appearance: He is ill-appearing.   Cardiovascular:      Rate and Rhythm: Normal rate.      Pulses: Normal pulses.   Pulmonary:      Effort: Pulmonary effort is normal. No respiratory distress.      Breath sounds: No wheezing.   Abdominal:      General: Bowel sounds are normal.   Musculoskeletal:         General: Swelling present.       Comments: Lower extremities with swelling significantly improved. Right arm swelling but no induration, erythema or increased warmth.    Skin:     General: Skin is warm and dry.      Comments: RLE wrapped and wound vac in place. Draining bloody material. Pain 7/10 .   RLE edema (baseline right hemiplegia and paresthesia from stroke).     Neurological:      Mental Status: He is alert. Mental status is at baseline.   Psychiatric:         Mood and Affect: Mood normal.         Thought Content: Thought content normal.             Significant Labs: All pertinent labs within the past 24 hours have been reviewed.    Significant Imaging: I have reviewed all pertinent imaging results/findings within the past 24 hours.    Assessment/Plan:      * Cellulitis/Abscess  of right foot/ankle  R ankle pain, swelling, warmth present. Potential gout vs cellulitis. 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 No fracture present. Uric acid normal. Arterial US and venous US with no clots, appropriate flow  - on ceftriaxone/vanc for 7 days and still with pain and now with increased WBC and fevers  - blood cultures currently NGTD  - CT right ankle with no evidence of osteo.  -4/4-Right ankle wound then had new abscess/discharge sp I& D with wound vac placement  -4/6-Repeat OR I&D and wound vac back on. OP wound culture grew e coli. Leukocytosis stable, afebrile. Follow up culture data. Continue vanc and cefepine. Continue to hold eliquis until clear by Orthopedic Surgery.   Update Erick Tracey    Fever  Due to right ankle/foot infection.   Afebrile    Open wound        Paroxysmal atrial fibrillation with RVR  Patient has Paroxysmal (<7 days) atrial fibrillation which is uncontrolled. Patient is currently in atrial fibrillation.  - new onset Afib  - on labetalol for BP at home, eliquis for chronic DVT but did miss some doses of eliquis  - in ED given diltiazem gtt but that caused hypotension  - EKG with Afib  RVR with normal Qtc  - started amiodarone with conversion to NSR. Changed to PO amiodarone  - however, he had recurrent Afib and was placed back on amio gtt.   - Cardiology consulted  - plan for SONY/DCCV on 3/22/24 but converted to sinus rhythm. Has been in/out of afib but rate controlled  - continue home eliquis  Hold Eliquis for I&D and possible future procedures.    Lab Results   Component Value Date    TSH 2.672 03/20/2024   - TTE normal EF, mild LAE    [id number] - VASc score:  Congestive Heart Failure or EF < 35% NO   Hypertension YES  +1   Age >= 75 NO   Diabetes Mellitus YES  +1   Stroke/TIA prior history YES  +2   Vascular disease (PAD, MI or Aortic Plaque)? YES  +1   Age 65 - 74 YES  +1   Female NO   Total 6   Sinus at this time.      Anemia in chronic kidney disease  Patient's anemia is currently controlled. Has not received any PRBCs to date. Etiology likely d/t chronic disease due to ESRD  Current CBC reviewed-   Lab Results   Component Value Date    HGB 6.2 (L) 04/05/2024    HCT 19.5 (LL) 04/05/2024   Hemoglobin 7.0-6.2 likely due to CKD and I and D. 1 unit packed red blood cell with dialysis.      Chronic deep vein thrombosis (DVT) of popliteal vein of right lower extremity 9/21/20 outside US; unprovoked; anticoagulation  Continue holding as patient may need repeat procedure.  Heparin for DVT prophylaxis.  -restart apixaban once cleared by Orthopedic surgery      History of stroke  PT/OT evaluation completed and recommended moderate intensity therapy  Case management will assist    Secondary hyperparathyroidism of renal origin  Continue renvela       ESRD (end stage renal disease)  ESRD via LUE AVF. Last session 3/18/24. Missed 3/20/24 due to fatigue. Usually MWF  - hyperkalemic on admit. Does not appear volume overloaded.   - Nephrology consulted.  - continue MWF HD    Hemiplegia and hemiparesis following cerebral infarction affecting right dominant side  No signs of acute stroke. Does have RUE  weakness.   - continue home asa, statin  - resume apixaban once cleared by Orthopedic surgery      Seizure disorder as sequela of cerebrovascular accident  Not currently on antiepileptics. No seizure activity reported. Monitor.       Controlled type 2 diabetes mellitus with chronic kidney disease on chronic dialysis, with long-term current use of insulin  A1c:   Lab Results   Component Value Date    HGBA1C 5.8 (H) 03/20/2024     Meds: SSI PRN to maintain goal 140-180  ADA renal diet, accuchecks, hypoglycemic protocol      Dyslipidemia  Continue statin      Benign essential HTN  Chronic. Latest blood pressure and vitals reviewed-     Temp:  [97.3 °F (36.3 °C)-101.5 °F (38.6 °C)]   Pulse:  [69-93]   Resp:  [14-19]   BP: (102-131)/(51-60)   SpO2:  [91 %-98 %] .   Home meds for hypertension were reviewed and noted below.   Hypertension Medications               amLODIPine (NORVASC) 10 MG tablet Take 1 tablet by mouth once daily    labetaloL (NORMODYNE) 100 MG tablet Take 1 tablet (100 mg total) by mouth once daily.          Continue current management.    Will utilize p.r.n. blood pressure medication only if patient's blood pressure greater than 180/110 and he develops symptoms such as worsening chest pain or shortness of breath.      VTE Risk Mitigation (From admission, onward)           Ordered     heparin (porcine) injection 5,000 Units  Every 12 hours         04/04/24 1602                  Discharge Planning   GARY:      Code Status: Full Code   Is the patient medically ready for discharge?:     Reason for patient still in hospital (select all that apply): Treatment  Discharge Plan A: Home Health, Home with family   Discharge Delays: (!) Procedure Scheduling (IR, OR, Labs, Echo, Cath, Echo, EEG)      Beverly Tong NP  Department of Hospital Medicine   [facility] [place] [facility]

## 2024-04-06 NOTE — TRANSFER OF CARE
"Anesthesia Transfer of Care Note    Patient: Miguel Moore    Procedure(s) Performed: Procedure(s) (LRB):  INCISION AND DRAINAGE, LOWER EXTREMITY (Right)    Patient location: PACU    Anesthesia Type: MAC    Transport from OR: Transported from OR on 2-3 L/min O2 by NC with adequate spontaneous ventilation    Post pain: adequate analgesia    Post assessment: no apparent anesthetic complications and tolerated procedure well    Post vital signs: stable    Level of consciousness: sedated and responds to stimulation    Nausea/Vomiting: no nausea/vomiting    Complications: none    Transfer of care protocol was followed      Last vitals: Visit Vitals  BP (!) 113/55 (BP Location: Right arm)   Pulse 79   Temp 36.9 °C (98.5 °F) (Temporal)   Resp 17   Ht 5' 8" (1.727 m)   Wt 90.4 kg (199 lb 4.7 oz)   SpO2 100%   BMI 30.30 kg/m²     "

## 2024-04-06 NOTE — ASSESSMENT & PLAN NOTE
R ankle pain, swelling, warmth present. Potential gout vs cellulitis. 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 No fracture present. Uric acid normal. Arterial US and venous US with no clots, appropriate flow  - on ceftriaxone/vanc for 7 days and still with pain and now with increased WBC and fevers  - blood cultures currently NGTD  - CT right ankle with no evidence of osteo.  -4/4-Right ankle wound then had new abscess/discharge sp I& D with wound vac placement  -4/6-Repeat OR I&D and wound vac back on. OP wound culture grew e coli. Leukocytosis stable, afebrile. Follow up culture data. Continue vanc and cefepine. Continue to hold eliquis until clear by Orthopedic Surgery.   Update Erick Webb

## 2024-04-07 LAB
ALBUMIN SERPL BCP-MCNC: 1.3 G/DL (ref 3.5–5.2)
ALP SERPL-CCNC: 89 U/L (ref 55–135)
ALT SERPL W/O P-5'-P-CCNC: 13 U/L (ref 10–44)
ANION GAP SERPL CALC-SCNC: 10 MMOL/L (ref 8–16)
AST SERPL-CCNC: 27 U/L (ref 10–40)
BASOPHILS # BLD AUTO: 0.05 K/UL (ref 0–0.2)
BASOPHILS NFR BLD: 0.4 % (ref 0–1.9)
BILIRUB SERPL-MCNC: 0.7 MG/DL (ref 0.1–1)
BUN SERPL-MCNC: 35 MG/DL (ref 8–23)
CALCIUM SERPL-MCNC: 7.1 MG/DL (ref 8.7–10.5)
CHLORIDE SERPL-SCNC: 98 MMOL/L (ref 95–110)
CO2 SERPL-SCNC: 26 MMOL/L (ref 23–29)
CREAT SERPL-MCNC: 7.3 MG/DL (ref 0.5–1.4)
DIFFERENTIAL METHOD BLD: ABNORMAL
EOSINOPHIL # BLD AUTO: 0.1 K/UL (ref 0–0.5)
EOSINOPHIL NFR BLD: 0.8 % (ref 0–8)
ERYTHROCYTE [DISTWIDTH] IN BLOOD BY AUTOMATED COUNT: 19.1 % (ref 11.5–14.5)
EST. GFR  (NO RACE VARIABLE): 7 ML/MIN/1.73 M^2
GLUCOSE SERPL-MCNC: 155 MG/DL (ref 70–110)
HCT VFR BLD AUTO: 22.6 % (ref 40–54)
HGB BLD-MCNC: 7.5 G/DL (ref 14–18)
IMM GRANULOCYTES # BLD AUTO: 0.15 K/UL (ref 0–0.04)
IMM GRANULOCYTES NFR BLD AUTO: 1.2 % (ref 0–0.5)
LYMPHOCYTES # BLD AUTO: 0.9 K/UL (ref 1–4.8)
LYMPHOCYTES NFR BLD: 6.9 % (ref 18–48)
MCH RBC QN AUTO: 25.9 PG (ref 27–31)
MCHC RBC AUTO-ENTMCNC: 33.2 G/DL (ref 32–36)
MCV RBC AUTO: 78 FL (ref 82–98)
MONOCYTES # BLD AUTO: 0.9 K/UL (ref 0.3–1)
MONOCYTES NFR BLD: 7.3 % (ref 4–15)
NEUTROPHILS # BLD AUTO: 10.7 K/UL (ref 1.8–7.7)
NEUTROPHILS NFR BLD: 83.4 % (ref 38–73)
NRBC BLD-RTO: 0 /100 WBC
PLATELET # BLD AUTO: 246 K/UL (ref 150–450)
PMV BLD AUTO: 9.4 FL (ref 9.2–12.9)
POCT GLUCOSE: 141 MG/DL (ref 70–110)
POCT GLUCOSE: 154 MG/DL (ref 70–110)
POCT GLUCOSE: 177 MG/DL (ref 70–110)
POTASSIUM SERPL-SCNC: 3.7 MMOL/L (ref 3.5–5.1)
PROT SERPL-MCNC: 4.3 G/DL (ref 6–8.4)
RBC # BLD AUTO: 2.9 M/UL (ref 4.6–6.2)
SODIUM SERPL-SCNC: 134 MMOL/L (ref 136–145)
WBC # BLD AUTO: 12.85 K/UL (ref 3.9–12.7)

## 2024-04-07 PROCEDURE — 63600175 PHARM REV CODE 636 W HCPCS: Mod: HCNC | Performed by: ORTHOPAEDIC SURGERY

## 2024-04-07 PROCEDURE — 25000003 PHARM REV CODE 250: Mod: HCNC | Performed by: ORTHOPAEDIC SURGERY

## 2024-04-07 PROCEDURE — 21400001 HC TELEMETRY ROOM: Mod: HCNC

## 2024-04-07 PROCEDURE — 36415 COLL VENOUS BLD VENIPUNCTURE: CPT | Mod: HCNC | Performed by: ORTHOPAEDIC SURGERY

## 2024-04-07 PROCEDURE — 80053 COMPREHEN METABOLIC PANEL: CPT | Mod: HCNC | Performed by: ORTHOPAEDIC SURGERY

## 2024-04-07 PROCEDURE — 85025 COMPLETE CBC W/AUTO DIFF WBC: CPT | Mod: HCNC | Performed by: ORTHOPAEDIC SURGERY

## 2024-04-07 PROCEDURE — 99233 SBSQ HOSP IP/OBS HIGH 50: CPT | Mod: HCNC,,, | Performed by: INTERNAL MEDICINE

## 2024-04-07 PROCEDURE — 27000221 HC OXYGEN, UP TO 24 HOURS: Mod: HCNC

## 2024-04-07 PROCEDURE — 94761 N-INVAS EAR/PLS OXIMETRY MLT: CPT | Mod: HCNC

## 2024-04-07 RX ADMIN — HYDROMORPHONE HYDROCHLORIDE 0.5 MG: 1 INJECTION, SOLUTION INTRAMUSCULAR; INTRAVENOUS; SUBCUTANEOUS at 05:04

## 2024-04-07 RX ADMIN — HYDROMORPHONE HYDROCHLORIDE 0.5 MG: 1 INJECTION, SOLUTION INTRAMUSCULAR; INTRAVENOUS; SUBCUTANEOUS at 04:04

## 2024-04-07 RX ADMIN — CEFEPIME 1 G: 1 INJECTION, POWDER, FOR SOLUTION INTRAMUSCULAR; INTRAVENOUS at 08:04

## 2024-04-07 RX ADMIN — FINASTERIDE 5 MG: 5 TABLET, FILM COATED ORAL at 08:04

## 2024-04-07 RX ADMIN — ATORVASTATIN CALCIUM 80 MG: 40 TABLET, FILM COATED ORAL at 08:04

## 2024-04-07 RX ADMIN — HEPARIN SODIUM 5000 UNITS: 5000 INJECTION INTRAVENOUS; SUBCUTANEOUS at 08:04

## 2024-04-07 RX ADMIN — NEPHROCAP 1 CAPSULE: 1 CAP ORAL at 08:04

## 2024-04-07 RX ADMIN — TAMSULOSIN HYDROCHLORIDE 0.8 MG: 0.4 CAPSULE ORAL at 08:04

## 2024-04-07 RX ADMIN — AMIODARONE HYDROCHLORIDE 400 MG: 200 TABLET ORAL at 08:04

## 2024-04-07 RX ADMIN — HYDROMORPHONE HYDROCHLORIDE 0.5 MG: 1 INJECTION, SOLUTION INTRAMUSCULAR; INTRAVENOUS; SUBCUTANEOUS at 11:04

## 2024-04-07 RX ADMIN — MELATONIN TAB 3 MG 6 MG: 3 TAB at 11:04

## 2024-04-07 NOTE — NURSING
[facility]  Nurses Note -- 4 Eyes      4/6/2024       Skin assessed on: Q Shift      [] No Pressure Injuries Present    [x]Prevention Measures Documented    [x] Yes LDA  for Pressure Injury Previously documented     [] Yes New Pressure Injury Discovered   [] LDA for New Pressure Injury Added      Attending RN:  Dinora Manrique RN     Second RN: Jessica Crowley RN

## 2024-04-07 NOTE — ASSESSMENT & PLAN NOTE
Patient's anemia is currently controlled. Has not received any PRBCs to date.   Lab Results   Component Value Date    HGB 8.7 (L) 04/06/2024    HCT 21.3 (L) 04/06/2024   Anemia of CKD plus anemia of acute blood loss as expected from surgical procedures.  Transfused 2 units of blood with adequate correction.  Continue to monitor

## 2024-04-07 NOTE — SUBJECTIVE & OBJECTIVE
"Interval History: Feeling better. "Am I going to pull through?" he asks.    Review of Systems   Constitutional:  Negative for chills and fever.   Skin:  Positive for wound.   All other systems reviewed and are negative.    Objective:     Vital Signs (Most Recent):  Temp: 98.5 °F (36.9 °C) (04/07/24 1117)  Pulse: 68 (04/07/24 1117)  Resp: 18 (04/07/24 1119)  BP: (!) 107/53 (04/07/24 1117)  SpO2: 96 % (04/07/24 1117) Vital Signs (24h Range):  Temp:  [97.7 °F (36.5 °C)-100 °F (37.8 °C)] 98.5 °F (36.9 °C)  Pulse:  [67-79] 68  Resp:  [16-18] 18  SpO2:  [91 %-100 %] 96 %  BP: ()/(42-61) 107/53     Weight: 90.4 kg (199 lb 4.7 oz)  Body mass index is 30.3 kg/m².    Estimated Creatinine Clearance: 10.4 mL/min (A) (based on SCr of 7.3 mg/dL (H)).     Physical Exam  Vitals and nursing note reviewed.   Constitutional:       General: He is not in acute distress.     Appearance: Normal appearance. He is not ill-appearing, toxic-appearing or diaphoretic.   HENT:      Head: Normocephalic and atraumatic.   Musculoskeletal:         General: Deformity present.      Comments: Vac dressing   Neurological:      General: No focal deficit present.      Mental Status: He is alert and oriented to person, place, and time.   Psychiatric:         Mood and Affect: Mood normal.         Behavior: Behavior normal.         Thought Content: Thought content normal.         Judgment: Judgment normal.          Significant Labs: CBC:   Recent Labs   Lab 04/06/24  0631 04/06/24  1502 04/07/24  0532   WBC 15.51*  --  12.85*   HGB 6.8* 8.7* 7.5*   HCT 21.3*  --  22.6*     --  246     CMP:   Recent Labs   Lab 04/06/24  0631 04/07/24  0533   * 134*   K 3.7 3.7   CL 99 98   CO2 23 26   * 155*   BUN 25* 35*   CREATININE 5.6* 7.3*   CALCIUM 7.2* 7.1*   PROT 4.7* 4.3*   ALBUMIN 1.4* 1.3*   BILITOT 0.8 0.7   ALKPHOS 104 89   AST 27 27   ALT 12 13   ANIONGAP 12 10     Microbiology Results (last 7 days)       Procedure Component Value Units " Date/Time    Aerobic culture [[id number]]  (Abnormal) Collected: 04/04/24 1115    Order Status: Completed Specimen: Wound from Ankle, Right Updated: 04/06/24 0943     Aerobic Bacterial Culture ESCHERICHIA COLI  Rare  For susceptibility see order # [id number]      Narrative:      Right ankle wound    Aerobic culture [[id number]]  (Abnormal)  (Susceptibility) Collected: 04/04/24 1110    Order Status: Completed Specimen: Incision site from Ankle, Right Updated: 04/06/24 0942     Aerobic Bacterial Culture ESCHERICHIA COLI  Few      Narrative:      Right Extensor Retinaculum    Culture, Anaerobe [[id number]] Collected: 04/04/24 1110    Order Status: Completed Specimen: Incision site from Ankle, Right Updated: 04/06/24 0849     Anaerobic Culture Culture in progress    Narrative:      Right Extensor Retinaculum    Culture, Anaerobe [[id number]] Collected: 04/04/24 1115    Order Status: Completed Specimen: Wound from Ankle, Right Updated: 04/06/24 0848     Anaerobic Culture Culture in progress    Narrative:      Right ankle wound    Blood culture [[id number]] Collected: 03/31/24 0940    Order Status: Completed Specimen: Blood from Peripheral, Forearm, Right Updated: 04/04/24 1103     Blood Culture, Routine No Growth after 4 days.    Blood culture [[id number]] Collected: 03/31/24 0948    Order Status: Completed Specimen: Blood from Peripheral, Hand, Right Updated: 04/04/24 1103     Blood Culture, Routine No Growth after 4 days.    Blood culture [[id number]] Collected: 03/27/24 1719    Order Status: Completed Specimen: Blood from Peripheral, Wrist, Right Updated: 04/01/24 1103     Blood Culture, Routine No Growth after 4 days.    Blood culture [[id number]] Collected: 03/27/24 1727    Order Status: Completed Specimen: Blood from Peripheral, Hand, Right Updated: 03/31/24 1903     Blood Culture, Routine No Growth after 4 days.            Significant Imaging: I have reviewed all pertinent imaging results/findings within the past  24 hours.

## 2024-04-07 NOTE — ASSESSMENT & PLAN NOTE
Chronic. Latest blood pressure and vitals reviewed-     Temp:  [97.9 °F (36.6 °C)-100 °F (37.8 °C)]   Pulse:  [67-79]   Resp:  [16-18]   BP: ()/(42-61)   SpO2:  [91 %-100 %] .   Home meds for hypertension were reviewed and noted below.   Hypertension Medications               amLODIPine (NORVASC) 10 MG tablet Take 1 tablet by mouth once daily    labetaloL (NORMODYNE) 100 MG tablet Take 1 tablet (100 mg total) by mouth once daily.          Continue current management.    Will utilize p.r.n. blood pressure medication only if patient's blood pressure greater than 180/110 and he develops symptoms such as worsening chest pain or shortness of breath.

## 2024-04-07 NOTE — ASSESSMENT & PLAN NOTE
R ankle pain, swelling, warmth present. Potential gout vs cellulitis. 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 No fracture present. Uric acid normal. Arterial US and venous US with no clots, appropriate flow  - on ceftriaxone/vanc for 7 days and still with pain and now with increased WBC and fevers  - blood cultures currently NGTD  - CT right ankle with no evidence of osteo.  -4/4-Right ankle wound then had new abscess/discharge sp I& D with wound vac placement  -4/6-Repeat OR I&D and wound vac back on. OP wound culture grew e coli. Leukocytosis stable, afebrile. Follow up culture data. Continue vanc and cefepine. ID following for ABX recs.

## 2024-04-07 NOTE — NURSING
[facility]  Nurses Note -- 4 Eyes      4/7/2024       Skin assessed on: Q Shift      [] No Pressure Injuries Present    []Prevention Measures Documented    [x] Yes LDA  for Pressure Injury Previously documented     [] Yes New Pressure Injury Discovered   [] LDA for New Pressure Injury Added      Attending RN:  Olivia Crowley, RN     Second RN:  Dinora Manrique

## 2024-04-07 NOTE — PLAN OF CARE
Problem: Adult Inpatient Plan of Care  Goal: Plan of Care Review  Outcome: Ongoing, Progressing  Goal: Patient-Specific Goal (Individualized)  Outcome: Ongoing, Progressing  Goal: Absence of Hospital-Acquired Illness or Injury  Outcome: Ongoing, Progressing  Goal: Optimal Comfort and Wellbeing  Outcome: Ongoing, Progressing     Problem: Infection  Goal: Absence of Infection Signs and Symptoms  Outcome: Ongoing, Progressing     Problem: Infection (Hemodialysis)  Goal: Absence of Infection Signs and Symptoms  Outcome: Ongoing, Progressing     Problem: Fall Injury Risk  Goal: Absence of Fall and Fall-Related Injury  Outcome: Ongoing, Progressing

## 2024-04-07 NOTE — SUBJECTIVE & OBJECTIVE
Interval History: denies any pain.    Review of Systems   HENT:  Negative for ear discharge and ear pain.    Eyes:  Negative for discharge and itching.   Endocrine: Negative for cold intolerance and heat intolerance.   Neurological:  Negative for seizures and syncope.     Objective:     Vital Signs (Most Recent):  Temp: 98.5 °F (36.9 °C) (04/07/24 1117)  Pulse: 68 (04/07/24 1117)  Resp: 18 (04/07/24 1119)  BP: (!) 107/53 (04/07/24 1117)  SpO2: 96 % (04/07/24 1117) Vital Signs (24h Range):  Temp:  [97.9 °F (36.6 °C)-100 °F (37.8 °C)] 98.5 °F (36.9 °C)  Pulse:  [67-79] 68  Resp:  [16-18] 18  SpO2:  [91 %-100 %] 96 %  BP: ()/(42-61) 107/53     Weight: 90.4 kg (199 lb 4.7 oz)  Body mass index is 30.3 kg/m².    Intake/Output Summary (Last 24 hours) at 4/7/2024 1237  Last data filed at 4/7/2024 0830  Gross per 24 hour   Intake 652.53 ml   Output 50 ml   Net 602.53 ml           Physical Exam  Vitals and nursing note reviewed.   Constitutional:       General: He is not in acute distress.     Appearance: He is ill-appearing.   Cardiovascular:      Rate and Rhythm: Normal rate.      Pulses: Normal pulses.   Pulmonary:      Effort: Pulmonary effort is normal. No respiratory distress.      Breath sounds: No wheezing.   Abdominal:      General: Bowel sounds are normal.   Skin:     General: Skin is warm and dry.      Comments: Wound vac to RLE   Neurological:      Mental Status: He is alert. Mental status is at baseline.   Psychiatric:         Mood and Affect: Mood normal.         Thought Content: Thought content normal.             Significant Labs: All pertinent labs within the past 24 hours have been reviewed.  BMP:   Recent Labs   Lab 04/07/24  0533   *   *   K 3.7   CL 98   CO2 26   BUN 35*   CREATININE 7.3*   CALCIUM 7.1*       CBC:   Recent Labs   Lab 04/06/24  0631 04/06/24  1502 04/07/24  0532   WBC 15.51*  --  12.85*   HGB 6.8* 8.7* 7.5*   HCT 21.3*  --  22.6*     --  246         Significant  Imaging: I have reviewed all pertinent imaging results/findings within the past 24 hours.

## 2024-04-07 NOTE — ASSESSMENT & PLAN NOTE
67 yo man with ESRD on HD, DM and CVA with R sided weakness admitted for fatigue, hyperkalemia, found to have Afib with RVR. Admission course notable for R ankle cellulitis (completed 10d of abx therapy), CT with extensive subcutaneous edema, no focal fluid collection or OM seen. He spiked episodic fevers- blood cx obtained and were sterile. TTE on 3/20 without IE. Mild swelling around ankle, pt reported interval improvement in pain and swelling since admission - no ongoing cellulitis. Unclear source of fever. Fever continued - patient stable and without complaints. CTs performed and not helpful. Leg started draining and found to have abscess. Went for I&D and vac placement. Culture growing E.coli. Fever resolved.    Recommendations  Ok to change to po abx for leg abscess  Tolerated cephalosporins previously and cefepime now  Ok to transition to po cephalosporin (cefadroxil, 1 gram q72H), at your discretion, to complete 10 days of abx treatment

## 2024-04-07 NOTE — PROGRESS NOTES
[facility] Medicine  Progress Note    Patient Name: Miguel Moore  MRN: [id number]  Patient Class: IP- Inpatient   Admission Date: 3/20/2024  Length of Stay: 18 days  Attending Physician: Barrie Negro MD  Primary Care Provider: Raciel Raymond MD        Subjective:     Principal Problem:Cellulitis of right foot        HPI:  Mr Miguel Moore is a 68 y.o. man with ESRD on HD, HTN, DM with neuropathy, history of CVA with residual R sided weakness, chronic DVT on eliquis who presented with feeling poorly. He attended his usual dialysis session on Monday 3/18 without issues. He developed fatigue and R ankle swelling. He has some pain with palpation but is able to walk. No reports of trauma. No wounds. No falls. No history of gout. Today he was feeling worse so did not go to dialysis and came to the ED. No fevers. No shortness of breath. Normal appetite. No nausea, vomiting. No chest pain. No palpitations.     In the ED, afebrile with HR initially controlled then to 130s Afib RVR. Started diltiazem but became hypotensive. Diltiazem stopped. Given antibiotics. Admitted for further management.     Overview/Hospital Course:  Mr Miguel Moore is a 68 y.o. man with ESRD on HD, DM who was admitted with new onset Afib RVR, hyperkalemia from missed HD session, and R ankle cellulitis. Started amio gtt with conversion to NSR. TTE EF 55-60%, mild LAE. Cardiology consulted. Now on PO amiodarone and continues home eliquis (on this for chronic DVT). He received HD with resolution of hyperkalemia. He is on CTX for his R ankle cellulitis and was given colchicine in case gout could contribute. He developed recurrent Afib and amio gtt resumed. Cardiology was planning SONY/DCCV on 3/22. But converted to sinus. Transferred to floor 3/26.   Right ankle cellulitis improving however spiking fevers on 3/27 up to 102.7 F.  Vanc/Ceftriaxone changed to Meropenem. CT right ankle with no obvious acute infection, pain is  improving. Right forearm with swelling, u/s negative for abscess or hematoma. Blood cultures negative. Possible medication related fevers and ABx's stopped. 4/4-Right ankle wound then had new abscess/discharge sp I& D with wound vac placement by Orthopedic Surgery.  Anemia of CKD plus anemia of acute blood loss as expected from surgical procedure and patient transfused blood.  Repeat OR I&D and wound vac back on. Operative wound culture grew e coli. ID following for ABX recommendations.      Interval History: denies any pain.    Review of Systems   HENT:  Negative for ear discharge and ear pain.    Eyes:  Negative for discharge and itching.   Endocrine: Negative for cold intolerance and heat intolerance.   Neurological:  Negative for seizures and syncope.     Objective:     Vital Signs (Most Recent):  Temp: 98.5 °F (36.9 °C) (04/07/24 1117)  Pulse: 68 (04/07/24 1117)  Resp: 18 (04/07/24 1119)  BP: (!) 107/53 (04/07/24 1117)  SpO2: 96 % (04/07/24 1117) Vital Signs (24h Range):  Temp:  [97.9 °F (36.6 °C)-100 °F (37.8 °C)] 98.5 °F (36.9 °C)  Pulse:  [67-79] 68  Resp:  [16-18] 18  SpO2:  [91 %-100 %] 96 %  BP: ()/(42-61) 107/53     Weight: 90.4 kg (199 lb 4.7 oz)  Body mass index is 30.3 kg/m².    Intake/Output Summary (Last 24 hours) at 4/7/2024 1237  Last data filed at 4/7/2024 0830  Gross per 24 hour   Intake 652.53 ml   Output 50 ml   Net 602.53 ml           Physical Exam  Vitals and nursing note reviewed.   Constitutional:       General: He is not in acute distress.     Appearance: He is ill-appearing.   Cardiovascular:      Rate and Rhythm: Normal rate.      Pulses: Normal pulses.   Pulmonary:      Effort: Pulmonary effort is normal. No respiratory distress.      Breath sounds: No wheezing.   Abdominal:      General: Bowel sounds are normal.   Skin:     General: Skin is warm and dry.      Comments: Wound vac to RLE   Neurological:      Mental Status: He is alert. Mental status is at baseline.   Psychiatric:          Mood and Affect: Mood normal.         Thought Content: Thought content normal.             Significant Labs: All pertinent labs within the past 24 hours have been reviewed.  BMP:   Recent Labs   Lab 04/07/24  0533   *   *   K 3.7   CL 98   CO2 26   BUN 35*   CREATININE 7.3*   CALCIUM 7.1*       CBC:   Recent Labs   Lab 04/06/24  0631 04/06/24  1502 04/07/24  0532   WBC 15.51*  --  12.85*   HGB 6.8* 8.7* 7.5*   HCT 21.3*  --  22.6*     --  246         Significant Imaging: I have reviewed all pertinent imaging results/findings within the past 24 hours.    Assessment/Plan:      * Cellulitis/Abscess  of right foot/ankle  R ankle pain, swelling, warmth present. Potential gout vs cellulitis. 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 No fracture present. Uric acid normal. Arterial US and venous US with no clots, appropriate flow  - on ceftriaxone/vanc for 7 days and still with pain and now with increased WBC and fevers  - blood cultures currently NGTD  - CT right ankle with no evidence of osteo.  -4/4-Right ankle wound then had new abscess/discharge sp I& D with wound vac placement  -4/6-Repeat OR I&D and wound vac back on. OP wound culture grew e coli. Leukocytosis stable, afebrile. Follow up culture data. Continue vanc and cefepine. ID following for ABX recs.    Fever  Due to right ankle/foot infection.   Afebrile    Open wound        Paroxysmal atrial fibrillation with RVR  Patient has Paroxysmal (<7 days) atrial fibrillation which is uncontrolled. Patient is currently in atrial fibrillation.  - new onset Afib  - on labetalol for BP at home, eliquis for chronic DVT but did miss some doses of eliquis  - in ED given diltiazem gtt but that caused hypotension  - EKG with Afib RVR with normal Qtc  - started amiodarone with conversion to NSR. Changed to PO amiodarone  - however, he had recurrent Afib and was placed back on amio gtt.   - Cardiology consulted  - plan for  SONY/DCCV on 3/22/24 but converted to sinus rhythm. Has been in/out of afib but rate controlled  - continue home eliquis  Hold Eliquis for I&D and possible future procedures.    Lab Results   Component Value Date    TSH 2.672 03/20/2024   - TTE normal EF, mild LAE    [id number] - VASc score:  Congestive Heart Failure or EF < 35% NO   Hypertension YES  +1   Age >= 75 NO   Diabetes Mellitus YES  +1   Stroke/TIA prior history YES  +2   Vascular disease (PAD, MI or Aortic Plaque)? YES  +1   Age 65 - 74 YES  +1   Female NO   Total 6   Sinus at this time.      Anemia in chronic kidney disease  Patient's anemia is currently controlled. Has not received any PRBCs to date.   Lab Results   Component Value Date    HGB 8.7 (L) 04/06/2024    HCT 21.3 (L) 04/06/2024   Anemia of CKD plus anemia of acute blood loss as expected from surgical procedures.  Transfused 2 units of blood with adequate correction.  Continue to monitor    Chronic deep vein thrombosis (DVT) of popliteal vein of right lower extremity 9/21/20 outside US; unprovoked; anticoagulation  Continue holding as patient may need repeat procedure.  Heparin for DVT prophylaxis.  -restart apixaban once procedures done.      History of stroke  PT/OT evaluation completed and recommended moderate intensity therapy  Case management will assist    Secondary hyperparathyroidism of renal origin  Continue renvela       ESRD (end stage renal disease)  ESRD via LUE AVF. Last session 3/18/24. Missed 3/20/24 due to fatigue. Usually MWF  - hyperkalemic on admit. Does not appear volume overloaded.   - Nephrology consulted.  - continue MWF HD    Hemiplegia and hemiparesis following cerebral infarction affecting right dominant side  No signs of acute stroke. Does have RUE weakness.   - continue home asa, statin  - resume apixaban once done with procedures.      Seizure disorder as sequela of cerebrovascular accident  Not currently on antiepileptics. No seizure activity reported. Monitor.        Controlled type 2 diabetes mellitus with chronic kidney disease on chronic dialysis, with long-term current use of insulin  A1c:   Lab Results   Component Value Date    HGBA1C 5.8 (H) 03/20/2024     Meds: SSI PRN to maintain goal 140-180  ADA renal diet, accuchecks, hypoglycemic protocol      Dyslipidemia  Continue statin      Benign essential HTN  Chronic. Latest blood pressure and vitals reviewed-     Temp:  [97.9 °F (36.6 °C)-100 °F (37.8 °C)]   Pulse:  [67-79]   Resp:  [16-18]   BP: ()/(42-61)   SpO2:  [91 %-100 %] .   Home meds for hypertension were reviewed and noted below.   Hypertension Medications               amLODIPine (NORVASC) 10 MG tablet Take 1 tablet by mouth once daily    labetaloL (NORMODYNE) 100 MG tablet Take 1 tablet (100 mg total) by mouth once daily.          Continue current management.    Will utilize p.r.n. blood pressure medication only if patient's blood pressure greater than 180/110 and he develops symptoms such as worsening chest pain or shortness of breath.      VTE Risk Mitigation (From admission, onward)           Ordered     heparin (porcine) injection 5,000 Units  Every 12 hours         04/04/24 1602                    Discharge Planning   GARY:      Code Status: Full Code   Is the patient medically ready for discharge?:     Reason for patient still in hospital (select all that apply): Treatment  Discharge Plan A: Home Health, Home with family   Discharge Delays: (!) Procedure Scheduling (IR, OR, Labs, Echo, Cath, Echo, EEG)              Barrie Negro MD  Department of Hospital Medicine   [facility] Surg

## 2024-04-07 NOTE — PROGRESS NOTES
POD#1  No new issues. Pain is fairly well controlled  VSS    Rt LE- dsg in place and wound vac appears to be fxning appropriately. No drainage through the dsg.  NV status is at baseline(pt does not have voluntary control of his foot)    H/H 7/22    A/P Rt LE abscess  Cont wound vac  Consider PRBC  but pt does not appear to be symptomatic from anemia at this time  Leave dsg in place  Pt will need wound vac exchange in the OR on Mon or Tues  Encourage pressure relief to the lateral ankle

## 2024-04-07 NOTE — PROGRESS NOTES
[facility] - [facility]  Infectious Disease  Progress Note    Patient Name: Miguel Moore  MRN: [id number]  Admission Date: 3/20/2024  Length of Stay: 18 days  Attending Physician: Barrie Negro MD  Primary Care Provider: Raciel Raymond MD    Isolation Status: No active isolations  Assessment/Plan:      Fever    69 yo man with ESRD on HD, DM and CVA with R sided weakness admitted for fatigue, hyperkalemia, found to have Afib with RVR. Admission course notable for R ankle cellulitis (completed 10d of abx therapy), CT with extensive subcutaneous edema, no focal fluid collection or OM seen. He spiked episodic fevers- blood cx obtained and were sterile. TTE on 3/20 without IE. Mild swelling around ankle, pt reported interval improvement in pain and swelling since admission - no ongoing cellulitis. Unclear source of fever. Fever continued - patient stable and without complaints. CTs performed and not helpful. Leg started draining and found to have abscess. Went for I&D and vac placement. Culture growing E.coli. Fever resolved.    Recommendations  Ok to change to po abx for leg abscess  Tolerated cephalosporins previously and cefepime now  Ok to transition to po cephalosporin (cefadroxil, 1 gram q72H), at your discretion, to complete 10 days of abx treatment    Thank you for your consult. I will sign off. Please contact us if you have any additional questions.    Emil Ramirez MD  Infectious Disease  [facility] - Med Surg    Subjective:     Principal Problem:Cellulitis of right foot    HPI: 69 yo male with ESRD on HD, DM and CVA with R sided weakness admitted for fatigue, hyperkalemia, found to have Afib with RVR. ID consulted for fevers. Admission course notable for R ankle cellulitis, CT with extensive subcutaneous edema, no focal fluid collection or OM seen. He spiked a fever on 3/27 and 3/28 - blood cx obtained ngtd. TTE on 3/20 without IE. Pt is currently on vancomycin and meropenem. Prior to admission, pt  "reported he had pain in R ankle, denied fevers chills, sick contacts. He states that since he's been here, he has had some loose stools and RUE swelling. Reported hives with PCN in ithe past, tolerated carb/cephs.   Interval History: Feeling better. "Am I going to pull through?" he asks.    Review of Systems   Constitutional:  Negative for chills and fever.   Skin:  Positive for wound.   All other systems reviewed and are negative.    Objective:     Vital Signs (Most Recent):  Temp: 98.5 °F (36.9 °C) (04/07/24 1117)  Pulse: 68 (04/07/24 1117)  Resp: 18 (04/07/24 1119)  BP: (!) 107/53 (04/07/24 1117)  SpO2: 96 % (04/07/24 1117) Vital Signs (24h Range):  Temp:  [97.7 °F (36.5 °C)-100 °F (37.8 °C)] 98.5 °F (36.9 °C)  Pulse:  [67-79] 68  Resp:  [16-18] 18  SpO2:  [91 %-100 %] 96 %  BP: ()/(42-61) 107/53     Weight: 90.4 kg (199 lb 4.7 oz)  Body mass index is 30.3 kg/m².    Estimated Creatinine Clearance: 10.4 mL/min (A) (based on SCr of 7.3 mg/dL (H)).     Physical Exam  Vitals and nursing note reviewed.   Constitutional:       General: He is not in acute distress.     Appearance: Normal appearance. He is not ill-appearing, toxic-appearing or diaphoretic.   HENT:      Head: Normocephalic and atraumatic.   Musculoskeletal:         General: Deformity present.      Comments: Vac dressing   Neurological:      General: No focal deficit present.      Mental Status: He is alert and oriented to person, place, and time.   Psychiatric:         Mood and Affect: Mood normal.         Behavior: Behavior normal.         Thought Content: Thought content normal.         Judgment: Judgment normal.          Significant Labs: CBC:   Recent Labs   Lab 04/06/24  0631 04/06/24  1502 04/07/24  0532   WBC 15.51*  --  12.85*   HGB 6.8* 8.7* 7.5*   HCT 21.3*  --  22.6*     --  246     CMP:   Recent Labs   Lab 04/06/24  0631 04/07/24  0533   * 134*   K 3.7 3.7   CL 99 98   CO2 23 26   * 155*   BUN 25* 35*   CREATININE " 5.6* 7.3*   CALCIUM 7.2* 7.1*   PROT 4.7* 4.3*   ALBUMIN 1.4* 1.3*   BILITOT 0.8 0.7   ALKPHOS 104 89   AST 27 27   ALT 12 13   ANIONGAP 12 10     Microbiology Results (last 7 days)       Procedure Component Value Units Date/Time    Aerobic culture [[id number]]  (Abnormal) Collected: 04/04/24 1115    Order Status: Completed Specimen: Wound from Ankle, Right Updated: 04/06/24 0943     Aerobic Bacterial Culture ESCHERICHIA COLI  Rare  For susceptibility see order # [id number]      Narrative:      Right ankle wound    Aerobic culture [[id number]]  (Abnormal)  (Susceptibility) Collected: 04/04/24 1110    Order Status: Completed Specimen: Incision site from Ankle, Right Updated: 04/06/24 0942     Aerobic Bacterial Culture ESCHERICHIA COLI  Few      Narrative:      Right Extensor Retinaculum    Culture, Anaerobe [[id number]] Collected: 04/04/24 1110    Order Status: Completed Specimen: Incision site from Ankle, Right Updated: 04/06/24 0849     Anaerobic Culture Culture in progress    Narrative:      Right Extensor Retinaculum    Culture, Anaerobe [[id number]] Collected: 04/04/24 1115    Order Status: Completed Specimen: Wound from Ankle, Right Updated: 04/06/24 0848     Anaerobic Culture Culture in progress    Narrative:      Right ankle wound    Blood culture [[id number]] Collected: 03/31/24 0940    Order Status: Completed Specimen: Blood from Peripheral, Forearm, Right Updated: 04/04/24 1103     Blood Culture, Routine No Growth after 4 days.    Blood culture [[id number]] Collected: 03/31/24 0948    Order Status: Completed Specimen: Blood from Peripheral, Hand, Right Updated: 04/04/24 1103     Blood Culture, Routine No Growth after 4 days.    Blood culture [[id number]] Collected: 03/27/24 1719    Order Status: Completed Specimen: Blood from Peripheral, Wrist, Right Updated: 04/01/24 1103     Blood Culture, Routine No Growth after 4 days.    Blood culture [[id number]] Collected: 03/27/24 1727    Order Status: Completed  Specimen: Blood from Peripheral, Hand, Right Updated: 03/31/24 1096     Blood Culture, Routine No Growth after 4 days.            Significant Imaging: I have reviewed all pertinent imaging results/findings within the past 24 hours.

## 2024-04-07 NOTE — ASSESSMENT & PLAN NOTE
No signs of acute stroke. Does have RUE weakness.   - continue home asa, statin  - resume apixaban once done with procedures.

## 2024-04-07 NOTE — ASSESSMENT & PLAN NOTE
Patient has Paroxysmal (<7 days) atrial fibrillation which is uncontrolled. Patient is currently in atrial fibrillation.  - new onset Afib  - on labetalol for BP at home, eliquis for chronic DVT but did miss some doses of eliquis  - in ED given diltiazem gtt but that caused hypotension  - EKG with Afib RVR with normal Qtc  - started amiodarone with conversion to NSR. Changed to PO amiodarone  - however, he had recurrent Afib and was placed back on amio gtt.   - Cardiology consulted  - plan for SONY/DCCV on 3/22/24 but converted to sinus rhythm. Has been in/out of afib but rate controlled  - continue home eliquis  Hold Eliquis for I&D and possible future procedures.    Lab Results   Component Value Date    TSH 2.672 03/20/2024   - TTE normal EF, mild LAE    [id number] - VASc score:  Congestive Heart Failure or EF < 35% NO   Hypertension YES  +1   Age >= 75 NO   Diabetes Mellitus YES  +1   Stroke/TIA prior history YES  +2   Vascular disease (PAD, MI or Aortic Plaque)? YES  +1   Age 65 - 74 YES  +1   Female NO   Total 6   Sinus at this time.

## 2024-04-07 NOTE — PLAN OF CARE
Problem: Adult Inpatient Plan of Care  Goal: Plan of Care Review  Outcome: Ongoing, Progressing  Goal: Patient-Specific Goal (Individualized)  Outcome: Ongoing, Progressing  Goal: Absence of Hospital-Acquired Illness or Injury  Outcome: Ongoing, Progressing  Goal: Optimal Comfort and Wellbeing  Outcome: Ongoing, Progressing  Goal: Readiness for Transition of Care  Outcome: Ongoing, Progressing     Problem: Infection  Goal: Absence of Infection Signs and Symptoms  Outcome: Ongoing, Progressing     Problem: Diabetes Comorbidity  Goal: Blood Glucose Level Within Targeted Range  Outcome: Ongoing, Progressing     Problem: Device-Related Complication Risk (Hemodialysis)  Goal: Safe, Effective Therapy Delivery  Outcome: Ongoing, Progressing     Problem: Hemodynamic Instability (Hemodialysis)  Goal: Effective Tissue Perfusion  Outcome: Ongoing, Progressing     Problem: Infection (Hemodialysis)  Goal: Absence of Infection Signs and Symptoms  Outcome: Ongoing, Progressing     Problem: Skin Injury Risk Increased  Goal: Skin Health and Integrity  Outcome: Ongoing, Progressing     Problem: Impaired Wound Healing  Goal: Optimal Wound Healing  Outcome: Ongoing, Progressing     Problem: Fall Injury Risk  Goal: Absence of Fall and Fall-Related Injury  Outcome: Ongoing, Progressing

## 2024-04-07 NOTE — ASSESSMENT & PLAN NOTE
Continue holding as patient may need repeat procedure.  Heparin for DVT prophylaxis.  -restart apixaban once procedures done.

## 2024-04-08 ENCOUNTER — ANESTHESIA EVENT (OUTPATIENT)
Dept: SURGERY | Facility: HOSPITAL | Age: 69
DRG: 264 | End: 2024-04-08
Payer: MEDICARE

## 2024-04-08 PROBLEM — R50.9 FEVER: Status: RESOLVED | Noted: 2024-03-29 | Resolved: 2024-04-08

## 2024-04-08 LAB
ALBUMIN SERPL BCP-MCNC: 1.3 G/DL (ref 3.5–5.2)
ALP SERPL-CCNC: 101 U/L (ref 55–135)
ALT SERPL W/O P-5'-P-CCNC: 21 U/L (ref 10–44)
ANION GAP SERPL CALC-SCNC: 8 MMOL/L (ref 8–16)
AST SERPL-CCNC: 47 U/L (ref 10–40)
BACTERIA SPEC ANAEROBE CULT: NORMAL
BACTERIA SPEC ANAEROBE CULT: NORMAL
BASOPHILS # BLD AUTO: 0.05 K/UL (ref 0–0.2)
BASOPHILS NFR BLD: 0.4 % (ref 0–1.9)
BILIRUB SERPL-MCNC: 0.6 MG/DL (ref 0.1–1)
BUN SERPL-MCNC: 47 MG/DL (ref 8–23)
CALCIUM SERPL-MCNC: 7.1 MG/DL (ref 8.7–10.5)
CHLORIDE SERPL-SCNC: 100 MMOL/L (ref 95–110)
CO2 SERPL-SCNC: 24 MMOL/L (ref 23–29)
CREAT SERPL-MCNC: 8.5 MG/DL (ref 0.5–1.4)
DIFFERENTIAL METHOD BLD: ABNORMAL
EOSINOPHIL # BLD AUTO: 0.1 K/UL (ref 0–0.5)
EOSINOPHIL NFR BLD: 1.2 % (ref 0–8)
ERYTHROCYTE [DISTWIDTH] IN BLOOD BY AUTOMATED COUNT: 18.9 % (ref 11.5–14.5)
EST. GFR  (NO RACE VARIABLE): 6 ML/MIN/1.73 M^2
GLUCOSE SERPL-MCNC: 135 MG/DL (ref 70–110)
HCT VFR BLD AUTO: 24.1 % (ref 40–54)
HGB BLD-MCNC: 7.7 G/DL (ref 14–18)
IMM GRANULOCYTES # BLD AUTO: 0.2 K/UL (ref 0–0.04)
IMM GRANULOCYTES NFR BLD AUTO: 1.7 % (ref 0–0.5)
LYMPHOCYTES # BLD AUTO: 0.9 K/UL (ref 1–4.8)
LYMPHOCYTES NFR BLD: 7.8 % (ref 18–48)
MCH RBC QN AUTO: 25.6 PG (ref 27–31)
MCHC RBC AUTO-ENTMCNC: 32 G/DL (ref 32–36)
MCV RBC AUTO: 80 FL (ref 82–98)
MONOCYTES # BLD AUTO: 0.8 K/UL (ref 0.3–1)
MONOCYTES NFR BLD: 7 % (ref 4–15)
NEUTROPHILS # BLD AUTO: 9.5 K/UL (ref 1.8–7.7)
NEUTROPHILS NFR BLD: 81.9 % (ref 38–73)
NRBC BLD-RTO: 0 /100 WBC
PLATELET # BLD AUTO: 285 K/UL (ref 150–450)
PMV BLD AUTO: 9.5 FL (ref 9.2–12.9)
POCT GLUCOSE: 153 MG/DL (ref 70–110)
POCT GLUCOSE: 156 MG/DL (ref 70–110)
POCT GLUCOSE: 173 MG/DL (ref 70–110)
POTASSIUM SERPL-SCNC: 4.2 MMOL/L (ref 3.5–5.1)
PROT SERPL-MCNC: 4.2 G/DL (ref 6–8.4)
RBC # BLD AUTO: 3.01 M/UL (ref 4.6–6.2)
SODIUM SERPL-SCNC: 132 MMOL/L (ref 136–145)
WBC # BLD AUTO: 11.65 K/UL (ref 3.9–12.7)

## 2024-04-08 PROCEDURE — 21400001 HC TELEMETRY ROOM: Mod: HCNC

## 2024-04-08 PROCEDURE — 36415 COLL VENOUS BLD VENIPUNCTURE: CPT | Mod: HCNC | Performed by: ORTHOPAEDIC SURGERY

## 2024-04-08 PROCEDURE — 25000003 PHARM REV CODE 250: Mod: HCNC | Performed by: ORTHOPAEDIC SURGERY

## 2024-04-08 PROCEDURE — 63600175 PHARM REV CODE 636 W HCPCS: Mod: HCNC | Performed by: ORTHOPAEDIC SURGERY

## 2024-04-08 PROCEDURE — 99232 SBSQ HOSP IP/OBS MODERATE 35: CPT | Mod: HCNC,,,

## 2024-04-08 PROCEDURE — 63600175 PHARM REV CODE 636 W HCPCS: Mod: HCNC | Performed by: HOSPITALIST

## 2024-04-08 PROCEDURE — 80100016 HC MAINTENANCE HEMODIALYSIS: Mod: HCNC

## 2024-04-08 PROCEDURE — 97110 THERAPEUTIC EXERCISES: CPT | Mod: HCNC

## 2024-04-08 PROCEDURE — 85025 COMPLETE CBC W/AUTO DIFF WBC: CPT | Mod: HCNC | Performed by: ORTHOPAEDIC SURGERY

## 2024-04-08 PROCEDURE — 63600175 PHARM REV CODE 636 W HCPCS: Mod: JG,HCNC | Performed by: ORTHOPAEDIC SURGERY

## 2024-04-08 PROCEDURE — 80053 COMPREHEN METABOLIC PANEL: CPT | Mod: HCNC | Performed by: ORTHOPAEDIC SURGERY

## 2024-04-08 PROCEDURE — 97530 THERAPEUTIC ACTIVITIES: CPT | Mod: HCNC

## 2024-04-08 PROCEDURE — 27000221 HC OXYGEN, UP TO 24 HOURS: Mod: HCNC

## 2024-04-08 PROCEDURE — 25000003 PHARM REV CODE 250: Mod: HCNC | Performed by: HOSPITALIST

## 2024-04-08 RX ORDER — ACETAMINOPHEN 325 MG/1
650 TABLET ORAL EVERY 6 HOURS PRN
Status: DISCONTINUED | OUTPATIENT
Start: 2024-04-08 | End: 2024-04-19 | Stop reason: HOSPADM

## 2024-04-08 RX ORDER — HYDROMORPHONE HYDROCHLORIDE 1 MG/ML
0.5 INJECTION, SOLUTION INTRAMUSCULAR; INTRAVENOUS; SUBCUTANEOUS EVERY 4 HOURS PRN
Status: DISCONTINUED | OUTPATIENT
Start: 2024-04-08 | End: 2024-04-19 | Stop reason: HOSPADM

## 2024-04-08 RX ORDER — OXYCODONE AND ACETAMINOPHEN 10; 325 MG/1; MG/1
1 TABLET ORAL EVERY 4 HOURS PRN
Status: DISCONTINUED | OUTPATIENT
Start: 2024-04-08 | End: 2024-04-19 | Stop reason: HOSPADM

## 2024-04-08 RX ADMIN — ATORVASTATIN CALCIUM 80 MG: 40 TABLET, FILM COATED ORAL at 08:04

## 2024-04-08 RX ADMIN — HEPARIN SODIUM 5000 UNITS: 5000 INJECTION INTRAVENOUS; SUBCUTANEOUS at 08:04

## 2024-04-08 RX ADMIN — AMIODARONE HYDROCHLORIDE 400 MG: 200 TABLET ORAL at 08:04

## 2024-04-08 RX ADMIN — OXYCODONE AND ACETAMINOPHEN 1 TABLET: 10; 325 TABLET ORAL at 02:04

## 2024-04-08 RX ADMIN — HYDROMORPHONE HYDROCHLORIDE 0.5 MG: 1 INJECTION, SOLUTION INTRAMUSCULAR; INTRAVENOUS; SUBCUTANEOUS at 06:04

## 2024-04-08 RX ADMIN — EPOETIN ALFA-EPBX 5000 UNITS: 10000 INJECTION, SOLUTION INTRAVENOUS; SUBCUTANEOUS at 08:04

## 2024-04-08 RX ADMIN — NEPHROCAP 1 CAPSULE: 1 CAP ORAL at 08:04

## 2024-04-08 RX ADMIN — MELATONIN TAB 3 MG 6 MG: 3 TAB at 08:04

## 2024-04-08 RX ADMIN — OXYCODONE AND ACETAMINOPHEN 1 TABLET: 10; 325 TABLET ORAL at 08:04

## 2024-04-08 RX ADMIN — CEFEPIME 1 G: 1 INJECTION, POWDER, FOR SOLUTION INTRAMUSCULAR; INTRAVENOUS at 08:04

## 2024-04-08 RX ADMIN — TAMSULOSIN HYDROCHLORIDE 0.8 MG: 0.4 CAPSULE ORAL at 08:04

## 2024-04-08 RX ADMIN — HYDROMORPHONE HYDROCHLORIDE 0.5 MG: 1 INJECTION, SOLUTION INTRAMUSCULAR; INTRAVENOUS; SUBCUTANEOUS at 05:04

## 2024-04-08 RX ADMIN — FINASTERIDE 5 MG: 5 TABLET, FILM COATED ORAL at 08:04

## 2024-04-08 NOTE — PROGRESS NOTES
[facility]  Progress Note    Patient Name: Miguel Moore  MRN: [id number]  Patient Class: IP- Inpatient   Admission Date: 3/20/2024  Length of Stay: 19 days  Attending Physician: Barrie Negro MD  Primary Care Provider: Raciel Raymond MD        Subjective:     Principal Problem:Cellulitis of right foot        HPI:  Mr Miguel Moore is a 68 y.o. man with ESRD on HD, HTN, DM with neuropathy, history of CVA with residual R sided weakness, chronic DVT on eliquis who presented with feeling poorly. He attended his usual dialysis session on Monday 3/18 without issues. He developed fatigue and R ankle swelling. He has some pain with palpation but is able to walk. No reports of trauma. No wounds. No falls. No history of gout. Today he was feeling worse so did not go to dialysis and came to the ED. No fevers. No shortness of breath. Normal appetite. No nausea, vomiting. No chest pain. No palpitations.     In the ED, afebrile with HR initially controlled then to 130s Afib RVR. Started diltiazem but became hypotensive. Diltiazem stopped. Given antibiotics. Admitted for further management.     Overview/Hospital Course:  Mr Miguel Moore is a 68 y.o. man with ESRD on HD, DM who was admitted with new onset Afib RVR, hyperkalemia from missed HD session, and R ankle cellulitis. Started amio gtt with conversion to NSR. TTE EF 55-60%, mild LAE. Cardiology consulted. Now on PO amiodarone and continues home eliquis (on this for chronic DVT). He received HD with resolution of hyperkalemia. He is on CTX for his R ankle cellulitis and was given colchicine in case gout could contribute. He developed recurrent Afib and amio gtt resumed. Cardiology was planning SONY/DCCV on 3/22. But converted to sinus. Transferred to floor 3/26.   Right ankle cellulitis improving however spiking fevers on 3/27 up to 102.7 F.  Vanc/Ceftriaxone changed to Meropenem. CT right ankle with no obvious acute infection, pain is  improving. Right forearm with swelling, u/s negative for abscess or hematoma. Blood cultures negative. Possible medication related fevers and ABx's stopped. 4/4-Right ankle wound then had new abscess/discharge sp I& D with wound vac placement by Orthopedic Surgery.  Anemia of CKD plus anemia of acute blood loss as expected from surgical procedure and patient transfused blood.  Repeat OR I&D and wound vac back on. Operative wound culture grew e coli. ID following for ABX recommendations.      Interval History: complaining of pain to right leg.    Review of Systems   HENT:  Negative for ear discharge and ear pain.    Eyes:  Negative for discharge and itching.   Endocrine: Negative for cold intolerance and heat intolerance.   Neurological:  Negative for seizures and syncope.     Objective:     Vital Signs (Most Recent):  Temp: 98.5 °F (36.9 °C) (04/08/24 0930)  Pulse: 84 (04/08/24 1300)  Resp: 18 (04/08/24 1404)  BP: 138/64 (04/08/24 1300)  SpO2: 95 % (04/08/24 0725) Vital Signs (24h Range):  Temp:  [98.5 °F (36.9 °C)-99.6 °F (37.6 °C)] 98.5 °F (36.9 °C)  Pulse:  [64-86] 84  Resp:  [16-20] 18  SpO2:  [94 %-100 %] 95 %  BP: (104-139)/(51-69) 138/64     Weight: 90.4 kg (199 lb 4.7 oz)  Body mass index is 30.3 kg/m².    Intake/Output Summary (Last 24 hours) at 4/8/2024 1416  Last data filed at 4/8/2024 1300  Gross per 24 hour   Intake 709.78 ml   Output 2035 ml   Net -1325.22 ml           Physical Exam  Vitals and nursing note reviewed.   Constitutional:       General: He is not in acute distress.     Appearance: He is ill-appearing.   Cardiovascular:      Rate and Rhythm: Normal rate.      Pulses: Normal pulses.   Pulmonary:      Effort: Pulmonary effort is normal. No respiratory distress.      Breath sounds: No wheezing.   Abdominal:      General: Bowel sounds are normal.   Skin:     General: Skin is warm and dry.      Comments: Wound vac to RLE   Neurological:      Mental Status: He is alert. Mental status is at  baseline.   Psychiatric:         Mood and Affect: Mood normal.         Thought Content: Thought content normal.             Significant Labs: All pertinent labs within the past 24 hours have been reviewed.  BMP:   Recent Labs   Lab 04/08/24  0423   *   *   K 4.2      CO2 24   BUN 47*   CREATININE 8.5*   CALCIUM 7.1*       CBC:   Recent Labs   Lab 04/06/24  1502 04/07/24  0532 04/08/24  0423   WBC  --  12.85* 11.65   HGB 8.7* 7.5* 7.7*   HCT  --  22.6* 24.1*   PLT  --  246 285         Significant Imaging: I have reviewed all pertinent imaging results/findings within the past 24 hours.    Assessment/Plan:      * Cellulitis/Abscess  of right foot/ankle  R ankle pain, swelling, warmth present. Potential gout vs cellulitis. 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 No fracture present. Uric acid normal. Arterial US and venous US with no clots, appropriate flow  - on ceftriaxone/vanc for 7 days and still with pain and now with increased WBC and fevers  - blood cultures currently NGTD  - CT right ankle with no evidence of osteo.  -4/4-Right ankle wound then had new abscess/discharge sp I& D with wound vac placement  -4/6-Repeat OR I&D and wound vac back on. OP wound culture grew e coli. Leukocytosis stable, afebrile.   Per ID ok to transition to po cephalosporin (cefadroxil, 1 gram q72H) to complete 10 days of abx treatment on discharge.      Open wound        Paroxysmal atrial fibrillation with RVR  Patient has Paroxysmal (<7 days) atrial fibrillation which is uncontrolled. Patient is currently in atrial fibrillation.  - new onset Afib  - on labetalol for BP at home, eliquis for chronic DVT but did miss some doses of eliquis  - in ED given diltiazem gtt but that caused hypotension  - EKG with Afib RVR with normal Qtc  - started amiodarone with conversion to NSR. Changed to PO amiodarone  - however, he had recurrent Afib and was placed back on amio gtt.   - Cardiology  consulted  - plan for SONY/DCCV on 3/22/24 but converted to sinus rhythm. Has been in/out of afib but rate controlled  - continue home eliquis  Hold Eliquis for I&D and possible future procedures.  Restart once done with procedures.    Anemia in chronic kidney disease  Patient's anemia is currently controlled. Has not received any PRBCs to date.   Lab Results   Component Value Date    HGB 7.7 (L) 04/08/2024    HCT 24.1 (L) 04/08/2024   Anemia of CKD plus anemia of acute blood loss as expected from surgical procedures.  Transfused 2 units of blood with adequate correction.  Continue to monitor    Chronic deep vein thrombosis (DVT) of popliteal vein of right lower extremity 9/21/20 outside US; unprovoked; anticoagulation  Continue holding as patient may need repeat procedure.  Heparin for DVT prophylaxis.  -restart apixaban once procedures done.      History of stroke  PT/OT evaluation completed and recommended moderate intensity therapy  Case management will assist    Secondary hyperparathyroidism of renal origin  Continue renvela       ESRD (end stage renal disease)  ESRD via LUE AVF. Last session 3/18/24. Missed 3/20/24 due to fatigue. Usually MWF  - hyperkalemic on admit. Does not appear volume overloaded.   - Nephrology consulted.  - continue MWF HD    Hemiplegia and hemiparesis following cerebral infarction affecting right dominant side  No signs of acute stroke. Does have RUE weakness.   - continue home asa, statin  - resume apixaban once done with procedures.      Seizure disorder as sequela of cerebrovascular accident  Not currently on antiepileptics. No seizure activity reported. Monitor.       Controlled type 2 diabetes mellitus with chronic kidney disease on chronic dialysis, with long-term current use of insulin  A1c:   Lab Results   Component Value Date    HGBA1C 5.8 (H) 03/20/2024     Meds: SSI PRN to maintain goal 140-180  ADA renal diet, accuchecks, hypoglycemic protocol      Dyslipidemia  Continue  statin      Benign essential HTN  Chronic. Latest blood pressure and vitals reviewed-     Temp:  [98.5 °F (36.9 °C)-99.6 °F (37.6 °C)]   Pulse:  [64-86]   Resp:  [16-20]   BP: (104-139)/(51-69)   SpO2:  [94 %-100 %] .   Home meds for hypertension were reviewed and noted below.   Hypertension Medications               amLODIPine (NORVASC) 10 MG tablet Take 1 tablet by mouth once daily    labetaloL (NORMODYNE) 100 MG tablet Take 1 tablet (100 mg total) by mouth once daily.          Continue current management.    Will utilize p.r.n. blood pressure medication only if patient's blood pressure greater than 180/110 and he develops symptoms such as worsening chest pain or shortness of breath.      VTE Risk Mitigation (From admission, onward)           Ordered     heparin (porcine) injection 5,000 Units  Every 12 hours         04/04/24 1602                    Discharge Planning   GARY:      Code Status: Full Code   Is the patient medically ready for discharge?:     Reason for patient still in hospital (select all that apply): Treatment  Discharge Plan A: Home Health, Home with family   Discharge Delays: (!) Procedure Scheduling (IR, OR, Labs, Echo, Cath, Echo, EEG)              Barrie Negro MD  Department of Hospital Medicine   [facility] [place] - Med Surg

## 2024-04-08 NOTE — PT/OT/SLP PROGRESS
Physical Therapy      Patient Name:  Miguel Moore   MRN:  [id number]    Patient not seen today secondary to Dialysis. Will follow-up as able and available.

## 2024-04-08 NOTE — PLAN OF CARE
Problem: Occupational Therapy  Goal: Occupational Therapy Goal  Description: Goals to be met by: 4/15/24     Patient will increase functional independence with ADLs by performing:    Grooming while seated at EOB w/ seated with Supervision.  Toileting from bedside commode with Min A for hygiene and clothing management.   Rolling to Bilateral with SBA.   Supine to sit with SBA.  Sit to stand with Min A.   Sitting EOB for >25 min w/ SBA for sitting balance.   Step transfer: TBD   Toilet transfer: TBD  Upper extremity exercise program x15 reps per handout, with independence.    Outcome: Ongoing, Progressing

## 2024-04-08 NOTE — ASSESSMENT & PLAN NOTE
Chronic. Latest blood pressure and vitals reviewed-     Temp:  [98.5 °F (36.9 °C)-99.6 °F (37.6 °C)]   Pulse:  [64-86]   Resp:  [16-20]   BP: (104-139)/(51-69)   SpO2:  [94 %-100 %] .   Home meds for hypertension were reviewed and noted below.   Hypertension Medications               amLODIPine (NORVASC) 10 MG tablet Take 1 tablet by mouth once daily    labetaloL (NORMODYNE) 100 MG tablet Take 1 tablet (100 mg total) by mouth once daily.          Continue current management.    Will utilize p.r.n. blood pressure medication only if patient's blood pressure greater than 180/110 and he develops symptoms such as worsening chest pain or shortness of breath.

## 2024-04-08 NOTE — ASSESSMENT & PLAN NOTE
R ankle pain, swelling, warmth present. Potential gout vs cellulitis. 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 No fracture present. Uric acid normal. Arterial US and venous US with no clots, appropriate flow  - on ceftriaxone/vanc for 7 days and still with pain and now with increased WBC and fevers  - blood cultures currently NGTD  - CT right ankle with no evidence of osteo.  -4/4-Right ankle wound then had new abscess/discharge sp I& D with wound vac placement  -4/6-Repeat OR I&D and wound vac back on. OP wound culture grew e coli. Leukocytosis stable, afebrile.   Per ID ok to transition to po cephalosporin (cefadroxil, 1 gram q72H) to complete 10 days of abx treatment on discharge.

## 2024-04-08 NOTE — PROGRESS NOTES
Miguel Moore is a 68 y.o. male patient.    Follow for ESRD, dialysis    Patient seen while on dialysis  No new c/o, comfortable    Scheduled Meds:   sodium chloride 0.9%   Intravenous Once    amiodarone  400 mg Oral BID    atorvastatin  80 mg Oral Daily    ceFEPime IV (PEDS and ADULTS)  1 g Intravenous Q24H    epoetin marisol-epbx  5,000 Units Subcutaneous Every Mon, Wed, Fri    finasteride  5 mg Oral Daily    heparin (porcine)  5,000 Units Subcutaneous Q12H    tamsulosin  2 capsule Oral Daily    vitamin renal formula (B-complex-vitamin c-folic acid)  1 capsule Oral Daily       Review of patient's allergies indicates:   Allergen Reactions    Ace inhibitors      Hyperkalemia 8/2018  Other reaction(s): Unknown    Penicillins Hives     Tolerated cefepime and cefdinir previously    Tizanidine      Felt hot         Vital Signs Range (Last 24H):  Temp:  [98.5 °F (36.9 °C)-99.6 °F (37.6 °C)]   Pulse:  [64-73]   Resp:  [16-19]   BP: (104-131)/(51-63)   SpO2:  [94 %-100 %]     I & O (Last 24H):  Intake/Output Summary (Last 24 hours) at 4/8/2024 0951  Last data filed at 4/8/2024 0704  Gross per 24 hour   Intake 240 ml   Output 25 ml   Net 215 ml           Physical Exam:  General appearance: well developed, well nourished, no distress  Lungs:  clear to auscultation bilaterally and normal respiratory effort  Heart: regular rate and rhythm  Abdomen:  soft, normal bowel sounds  Extremities: no cyanosis or edema, or clubbing    Laboratory:  I have reviewed all pertinent lab results within the past 24 hours.  CBC:   Recent Labs   Lab 04/08/24 0423   WBC 11.65   RBC 3.01*   HGB 7.7*   HCT 24.1*      MCV 80*   MCH 25.6*   MCHC 32.0     CMP:   Recent Labs   Lab 04/08/24 0423   *   CALCIUM 7.1*   ALBUMIN 1.3*   PROT 4.2*   *   K 4.2   CO2 24      BUN 47*   CREATININE 8.5*   ALKPHOS 101   ALT 21   AST 47*   BILITOT 0.6     Assessment & Plan    ESRD - on dialysis, stable,tolerated well  HTN  DM type  2  (R) foot cellulitis/abscess  PAF  Anemia in CKD    Continue present Rx  HD q MWF  We'll follow for dialysis    Nils Menard  4/8/2024

## 2024-04-08 NOTE — SUBJECTIVE & OBJECTIVE
Interval History: complaining of pain to right leg.    Review of Systems   HENT:  Negative for ear discharge and ear pain.    Eyes:  Negative for discharge and itching.   Endocrine: Negative for cold intolerance and heat intolerance.   Neurological:  Negative for seizures and syncope.     Objective:     Vital Signs (Most Recent):  Temp: 98.5 °F (36.9 °C) (04/08/24 0930)  Pulse: 84 (04/08/24 1300)  Resp: 18 (04/08/24 1404)  BP: 138/64 (04/08/24 1300)  SpO2: 95 % (04/08/24 0725) Vital Signs (24h Range):  Temp:  [98.5 °F (36.9 °C)-99.6 °F (37.6 °C)] 98.5 °F (36.9 °C)  Pulse:  [64-86] 84  Resp:  [16-20] 18  SpO2:  [94 %-100 %] 95 %  BP: (104-139)/(51-69) 138/64     Weight: 90.4 kg (199 lb 4.7 oz)  Body mass index is 30.3 kg/m².    Intake/Output Summary (Last 24 hours) at 4/8/2024 1416  Last data filed at 4/8/2024 1300  Gross per 24 hour   Intake 709.78 ml   Output 2035 ml   Net -1325.22 ml           Physical Exam  Vitals and nursing note reviewed.   Constitutional:       General: He is not in acute distress.     Appearance: He is ill-appearing.   Cardiovascular:      Rate and Rhythm: Normal rate.      Pulses: Normal pulses.   Pulmonary:      Effort: Pulmonary effort is normal. No respiratory distress.      Breath sounds: No wheezing.   Abdominal:      General: Bowel sounds are normal.   Skin:     General: Skin is warm and dry.      Comments: Wound vac to RLE   Neurological:      Mental Status: He is alert. Mental status is at baseline.   Psychiatric:         Mood and Affect: Mood normal.         Thought Content: Thought content normal.             Significant Labs: All pertinent labs within the past 24 hours have been reviewed.  BMP:   Recent Labs   Lab 04/08/24  0423   *   *   K 4.2      CO2 24   BUN 47*   CREATININE 8.5*   CALCIUM 7.1*       CBC:   Recent Labs   Lab 04/06/24  1502 04/07/24  0532 04/08/24  0423   WBC  --  12.85* 11.65   HGB 8.7* 7.5* 7.7*   HCT  --  22.6* 24.1*   PLT  --  246 285          Significant Imaging: I have reviewed all pertinent imaging results/findings within the past 24 hours.

## 2024-04-08 NOTE — NURSING
[facility]  Nurses Note -- 4 Eyes      4/8/2024       Skin assessed on: Q Shift      [] No Pressure Injuries Present    []Prevention Measures Documented    [x] Yes LDA  for Pressure Injury Previously documented     [] Yes New Pressure Injury Discovered   [] LDA for New Pressure Injury Added      Attending RN:  Olivia Crowley, RN     Second RN:  Dinora Manrique

## 2024-04-08 NOTE — ASSESSMENT & PLAN NOTE
Patient has Paroxysmal (<7 days) atrial fibrillation which is uncontrolled. Patient is currently in atrial fibrillation.  - new onset Afib  - on labetalol for BP at home, eliquis for chronic DVT but did miss some doses of eliquis  - in ED given diltiazem gtt but that caused hypotension  - EKG with Afib RVR with normal Qtc  - started amiodarone with conversion to NSR. Changed to PO amiodarone  - however, he had recurrent Afib and was placed back on amio gtt.   - Cardiology consulted  - plan for SONY/DCCV on 3/22/24 but converted to sinus rhythm. Has been in/out of afib but rate controlled  - continue home eliquis  Hold Eliquis for I&D and possible future procedures.  Restart once done with procedures.

## 2024-04-08 NOTE — PLAN OF CARE
Problem: Adult Inpatient Plan of Care  Goal: Plan of Care Review  Outcome: Ongoing, Progressing  Goal: Patient-Specific Goal (Individualized)  Outcome: Ongoing, Progressing  Goal: Optimal Comfort and Wellbeing  Outcome: Ongoing, Progressing  Goal: Readiness for Transition of Care  Outcome: Ongoing, Progressing     Problem: Infection  Goal: Absence of Infection Signs and Symptoms  Outcome: Ongoing, Progressing     Problem: Infection (Hemodialysis)  Goal: Absence of Infection Signs and Symptoms  Outcome: Ongoing, Progressing     Problem: Skin Injury Risk Increased  Goal: Skin Health and Integrity  Outcome: Ongoing, Progressing     Problem: Impaired Wound Healing  Goal: Optimal Wound Healing  Outcome: Ongoing, Progressing     Problem: Fall Injury Risk  Goal: Absence of Fall and Fall-Related Injury  Outcome: Ongoing, Progressing     Problem: Pain Acute  Goal: Acceptable Pain Control and Functional Ability  Outcome: Ongoing, Progressing

## 2024-04-08 NOTE — PT/OT/SLP PROGRESS
"Occupational Therapy   Treatment    Name: Miguel Moore  MRN: [id number]  Admitting Diagnosis:  Cellulitis of right foot  2 Days Post-Op    Recommendations:     Discharge Recommendations: Moderate Intensity Therapy  Discharge Equipment Recommendations:  to be determined by next level of care  Barriers to discharge:   (Pt is at high risk of falls, readmission and morbidity if d/c home at this time.)    Assessment:     Miguel Moore is a 68 y.o. male with a medical diagnosis of Cellulitis of right foot.  Performance deficits affecting function are weakness, impaired endurance, impaired self care skills, impaired functional mobility, gait instability, impaired balance, decreased upper extremity function, decreased coordination, decreased lower extremity function, decreased safety awareness, pain, decreased ROM, edema, impaired skin.     Rehab Prognosis:  Good; patient would benefit from acute skilled OT services to address these deficits and reach maximum level of function.       Plan:     Patient to be seen  (3-5x/wk) to address the above listed problems via self-care/home management, therapeutic activities, therapeutic exercises  Plan of Care Expires: 04/15/24  Plan of Care Reviewed with: patient    Subjective     Chief Complaint: weakness after having dialysis   Patient/Family Comments/goals: "I will give it a try."   Pain/Comfort:  Pain Rating 1:  (Pt did not rate.)  Location - Side 1: Right  Location 1: foot    Objective:     Communicated with: Nurse prior to session.  Patient found HOB elevated with pressure relief boots, telemetry (AV fistula) upon OT entry to room.    General Precautions: Standard, fall    Orthopedic Precautions:N/A  Braces: N/A  Respiratory Status: Nasal cannula, flow 2 L/min     Occupational Performance:     Bed Mobility:  With prolonged sitting, pt observed w/ fatigue and decreased sitting balance, requiring mod A for maintaining balance.   Patient completed Scooting to HOB in supine with " minimum assistance, using LUE/LLE for bridging and using bed rail; max A for scooting to EOB    Patient completed Supine to Sit with moderate assistance and maximal assistance  Patient completed Sit to Supine with maximal assistance     Functional Mobility/Transfers:  Patient completed Sit <> Stand Transfer x 1 trial from EOB with maximal assistance  with  hand-held assist   Functional Mobility: Unable to take steps at this time.     Activities of Daily Living:  Upper Body Dressing: moderate assistance for donning gown over back     [facility] 6 Click ADL: 16    Treatment & Education:  -Pt performed weight shifting activities for correcting sitting balance.   -Pt performed multiple BUE/BLE AROM/AAROM while sitting EOB, unsupported.   -Questions and concerns addressed within scope.     Patient left HOB elevated with all lines intact and call button in reach    GOALS:   Multidisciplinary Problems       Occupational Therapy Goals          Problem: Occupational Therapy    Goal Priority Disciplines Outcome Interventions   Occupational Therapy Goal     OT, PT/OT Ongoing, Progressing    Description: Goals to be met by: 4/15/24     Patient will increase functional independence with ADLs by performing:    Grooming while seated at EOB w/ seated with Supervision.  Toileting from bedside commode with Min A for hygiene and clothing management.   Rolling to Bilateral with SBA.   Supine to sit with SBA.  Sit to stand with Min A.   Sitting EOB for >25 min w/ SBA for sitting balance.   Step transfer: TBD   Toilet transfer: TBD  Upper extremity exercise program x15 reps per handout, with independence.                         Time Tracking:     OT Date of Treatment: 04/08/24  OT Start Time: 1609  OT Stop Time: 1635  OT Total Time (min): 26 min    Billable Minutes:Therapeutic Activity 13  Therapeutic Exercise 13  Total Time 26    OT/GENEVA: OT          4/8/2024

## 2024-04-08 NOTE — PROGRESS NOTES
[facility] Surg  Wound Care  [facility]    Patient Name:  Miguel Moore   MRN:  [id number]  Date: 4/8/2024  Diagnosis: Cellulitis of right foot    History:     Past Medical History:   Diagnosis Date    Allergy     Clotting disorder     Elaquis and APlavix    Deep vein thrombosis     Diabetes mellitus, type 2     Hypertension     Nuclear sclerosis of both eyes 6/9/2017    Renal disorder     Seizures     Stroke     Urinary tract infection        Social History     Socioeconomic History    Marital status: Single   Occupational History    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, etc   Tobacco Use    Smoking status: Never    Smokeless tobacco: Never   Substance and Sexual Activity    Alcohol use: No    Drug use: No   Social History Narrative    Lives with daughter       Precautions:     Allergies as of 03/20/2024 - Reviewed 03/20/2024   Allergen Reaction Noted    Ace inhibitors  09/03/2018    Penicillins Hives 01/13/2015    Tizanidine  06/30/2020       [facility] Assessment Details/Treatment     Active Problem List with Overview Notes    Diagnosis Date Noted    Open wound 03/26/2024    Paroxysmal atrial fibrillation with RVR 03/20/2024    Cellulitis/Abscess  of right foot/ankle 03/20/2024    Bilateral posterior capsular opacification 05/02/2023    Pseudophakia 01/06/2022    History of diabetic ulcer of foot     Elevated PSA 2/18/21 prostate bx normal 02/18/2021 2/18/21 prostate bx normal  1/25/24 MRI prostate PIRADS 2      Pulmonary nodule 1/2021 4 mm nodule. 01/06/2021 1/6/2021 CT abd/pelvis: 4 mm nodule in the right middle lobe      Chronic deep vein thrombosis (DVT) of popliteal vein of right lower extremity 9/21/20 outside US; unprovoked; anticoagulation 09/21/2020    History of fungal infection candida parasilosis hospitalization 8/2020 08/29/2020     -Found to have candidemia - source unclear  -infectious disease consulted, Started on micafungin and now converted to fluconazole  -Repeat cultures  negative so far  -Dialysis line removed 8/28.   line replaced on 08/31 after line holiday.  -end date of fluconazole will be 09/11/2020.  Patient has ophthalmology follow-up scheduled on 09/08/2020. Tentative end date 9/11/2020 If no endophthalmitis. If noted to have endophthalmitis during optho visit, would need at least 4-6 weeks of treatment      Anemia in chronic kidney disease 08/26/2020    Nephrotic syndrome 08/16/2020    Type 2 diabetes mellitus with diabetic neuropathy, with long-term current use of insulin 05/10/2018    History of stroke 12/04/2017    CME (cystoid macular edema), bilateral 11/16/2017    Refractive error 11/16/2017    Senile nuclear sclerosis 10/05/2017    Right sided weakness 09/11/2017    Secondary hyperparathyroidism of renal origin 08/03/2017    Vitamin D deficiency 08/03/2017    Nuclear sclerosis, left 06/09/2017    Cortical cataract of both eyes 06/09/2017    DM type 2 without retinopathy 06/09/2017    Microcytosis 9/2019 labs c/w AoCD and AoCKD 03/22/2017     Seen on admission as well 2015; normal Hb, low MCV.  Normal RDW  08/17/2020 EGD normal esophagus.  Discolored nodular and texture change mucosa in the antrum.  Normal duodenum.  Path with foveolar hyperplasia and early xanthoma formation.  H pylori negative.  Mild chronic inflammation without acute activity      ESRD (end stage renal disease) 03/22/2017 2/27/20 renal US with dopplers no ALF.  Medical renal disease  8/2020 renal US: 1. Symmetric diffusely increased cortical echogenicity and elevated resistive indices, nonspecific indicators of chronic medical renal disease.  2. Prostatomegaly.  Some of the provided images are suggestive of a distinct hyperechoic component of the prostate gland, of uncertain etiology or significance, and potentially artifactual.  Dedicated prostate ultrasound or MRI may be of benefit for further characterization.    Started on HD while hospitalized for progression to ESRD 8/2020  -Continue  dialysis per nephrology  -Outpatient HD has been arranged  -Had planned discharge 8/27 if remained afebrile and cultures negative.  Unfortunately his blood culture grew Candida parapsilosis  -Dr. Gomez with ID consulted and case discussed with him.  Started micafungin 8/27 and now on fluconazole.  -Dialysis line removed after HD 8/28 and plan line holiday over weekend.  -new line by IR on 8/31, tolerated dialysis fluid.  -continue outpatient dialysis.      Benign essential HTN 03/21/2017 01/06/2021 TTE mild left atrial enlargement.  LV normal size and systolic function LVEF 55%.  Indeterminate diastolic function.  Mild right atrial enlargement.  Normal RV systolic function.  Normal RV size.  PA pressure 20.  Normal CVP.  Three.      Dyslipidemia 03/21/2017    Controlled type 2 diabetes mellitus with chronic kidney disease on chronic dialysis, with long-term current use of insulin 03/21/2017    BPH (benign prostatic hyperplasia) 2/18/21 prostate bx normal 03/21/2017 6/26/2017 renal US mod prostatomegaly.      Seizure disorder as sequela of cerebrovascular accident 03/21/2017    Hemiplegia and hemiparesis following cerebral infarction affecting right dominant side 03/21/2017 4/4 S/P Irrigation and debridement right leg, excisional. Placement of wound VAC dressing right leg per Dr. Montilla for right leg abscess  4/6 S/P Incision and drainage right lower extremity. Replacement of VAC dressing per Dr. Barillas for cellulitis of right foot and right ankle abscess  Notes from Ortho 4/7 - encourage pressure relief to the lateral ankle  EHOB boots in patient's room.  VAC at 125 mmHg continuous suction. Scant red drainage in VAC canister with ACE wrap over VAC dressing.   Orders placed for use of EHOB boots. Requested PCT place boots following bath.     04/08/2024

## 2024-04-08 NOTE — ASSESSMENT & PLAN NOTE
Patient's anemia is currently controlled. Has not received any PRBCs to date.   Lab Results   Component Value Date    HGB 7.7 (L) 04/08/2024    HCT 24.1 (L) 04/08/2024   Anemia of CKD plus anemia of acute blood loss as expected from surgical procedures.  Transfused 2 units of blood with adequate correction.  Continue to monitor

## 2024-04-08 NOTE — PROGRESS NOTES
The patient is seen in dialysis.  He reports no problems.    Temp:  [98.3 °F (36.8 °C)-99.6 °F (37.6 °C)] 98.3 °F (36.8 °C)  Pulse:  [64-86] 75  Resp:  [16-20] 18  SpO2:  [94 %-100 %] 94 %  BP: (104-139)/(51-69) 129/62    Physical examination:      Right leg wound VAC is in place.      Recent Labs   Lab 04/08/24  0423   WBC 11.65   RBC 3.01*   HGB 7.7*   HCT 24.1*      MCV 80*   MCH 25.6*   MCHC 32.0           Assessment and Plan:    68-year-old male with history of end-stage renal disease on dialysis presents with right leg infection. He underwent I&D of his right leg abscess on 04/04 and repeat I and D and replacement of wound VAC on 4/6.    Plan repeat washout and wound VAC change tomorrow     NPO after midnight.    Jimbo Montilla MD  [facility]  [phone]  This note has been transcribed with voice recognition software, but not reviewed and may contain unrecognized errors.

## 2024-04-08 NOTE — NURSING
[facility] [place]  Nurses Note -- 4 Eyes      4-7-24      Skin assessed on: Q Shift      [] No Pressure Injuries Present    [x]Prevention Measures Documented    [x] Yes LDA  for Pressure Injury Previously documented     [] Yes New Pressure Injury Discovered   [] LDA for New Pressure Injury Added      Attending RN:  Dinora Manrique RN     Second RN:  Jessica Kemp RN

## 2024-04-09 ENCOUNTER — ANESTHESIA (OUTPATIENT)
Dept: SURGERY | Facility: HOSPITAL | Age: 69
DRG: 264 | End: 2024-04-09
Payer: MEDICARE

## 2024-04-09 LAB
ALBUMIN SERPL BCP-MCNC: 1.3 G/DL (ref 3.5–5.2)
ALP SERPL-CCNC: 94 U/L (ref 55–135)
ALT SERPL W/O P-5'-P-CCNC: 24 U/L (ref 10–44)
ANION GAP SERPL CALC-SCNC: 8 MMOL/L (ref 8–16)
AST SERPL-CCNC: 72 U/L (ref 10–40)
BASOPHILS # BLD AUTO: 0.07 K/UL (ref 0–0.2)
BASOPHILS NFR BLD: 0.6 % (ref 0–1.9)
BILIRUB SERPL-MCNC: 0.6 MG/DL (ref 0.1–1)
BUN SERPL-MCNC: 29 MG/DL (ref 8–23)
CALCIUM SERPL-MCNC: 7.5 MG/DL (ref 8.7–10.5)
CHLORIDE SERPL-SCNC: 101 MMOL/L (ref 95–110)
CO2 SERPL-SCNC: 27 MMOL/L (ref 23–29)
CREAT SERPL-MCNC: 5.9 MG/DL (ref 0.5–1.4)
DIFFERENTIAL METHOD BLD: ABNORMAL
EOSINOPHIL # BLD AUTO: 0.1 K/UL (ref 0–0.5)
EOSINOPHIL NFR BLD: 0.8 % (ref 0–8)
ERYTHROCYTE [DISTWIDTH] IN BLOOD BY AUTOMATED COUNT: 19.2 % (ref 11.5–14.5)
EST. GFR  (NO RACE VARIABLE): 10 ML/MIN/1.73 M^2
GLUCOSE SERPL-MCNC: 189 MG/DL (ref 70–110)
HCT VFR BLD AUTO: 23.8 % (ref 40–54)
HGB BLD-MCNC: 7.8 G/DL (ref 14–18)
IMM GRANULOCYTES # BLD AUTO: 0.17 K/UL (ref 0–0.04)
IMM GRANULOCYTES NFR BLD AUTO: 1.3 % (ref 0–0.5)
LYMPHOCYTES # BLD AUTO: 0.9 K/UL (ref 1–4.8)
LYMPHOCYTES NFR BLD: 6.8 % (ref 18–48)
MCH RBC QN AUTO: 26 PG (ref 27–31)
MCHC RBC AUTO-ENTMCNC: 32.8 G/DL (ref 32–36)
MCV RBC AUTO: 79 FL (ref 82–98)
MONOCYTES # BLD AUTO: 0.9 K/UL (ref 0.3–1)
MONOCYTES NFR BLD: 7 % (ref 4–15)
NEUTROPHILS # BLD AUTO: 10.6 K/UL (ref 1.8–7.7)
NEUTROPHILS NFR BLD: 83.5 % (ref 38–73)
NRBC BLD-RTO: 0 /100 WBC
PLATELET # BLD AUTO: 290 K/UL (ref 150–450)
PMV BLD AUTO: 9.2 FL (ref 9.2–12.9)
POCT GLUCOSE: 164 MG/DL (ref 70–110)
POCT GLUCOSE: 172 MG/DL (ref 70–110)
POCT GLUCOSE: 175 MG/DL (ref 70–110)
POCT GLUCOSE: 184 MG/DL (ref 70–110)
POCT GLUCOSE: 198 MG/DL (ref 70–110)
POTASSIUM SERPL-SCNC: 3.8 MMOL/L (ref 3.5–5.1)
PROT SERPL-MCNC: 4.9 G/DL (ref 6–8.4)
RBC # BLD AUTO: 3 M/UL (ref 4.6–6.2)
SODIUM SERPL-SCNC: 136 MMOL/L (ref 136–145)
WBC # BLD AUTO: 12.71 K/UL (ref 3.9–12.7)

## 2024-04-09 PROCEDURE — D9220A PRA ANESTHESIA: Mod: ANES,,, | Performed by: ANESTHESIOLOGY

## 2024-04-09 PROCEDURE — 80053 COMPREHEN METABOLIC PANEL: CPT | Mod: HCNC | Performed by: ORTHOPAEDIC SURGERY

## 2024-04-09 PROCEDURE — 37000009 HC ANESTHESIA EA ADD 15 MINS: Mod: HCNC | Performed by: ORTHOPAEDIC SURGERY

## 2024-04-09 PROCEDURE — 63600175 PHARM REV CODE 636 W HCPCS: Mod: HCNC | Performed by: HOSPITALIST

## 2024-04-09 PROCEDURE — 36000705 HC OR TIME LEV I EA ADD 15 MIN: Mod: HCNC | Performed by: ORTHOPAEDIC SURGERY

## 2024-04-09 PROCEDURE — 21400001 HC TELEMETRY ROOM: Mod: HCNC

## 2024-04-09 PROCEDURE — 71000033 HC RECOVERY, INTIAL HOUR: Mod: HCNC | Performed by: ORTHOPAEDIC SURGERY

## 2024-04-09 PROCEDURE — 27201423 OPTIME MED/SURG SUP & DEVICES STERILE SUPPLY: Mod: HCNC | Performed by: ORTHOPAEDIC SURGERY

## 2024-04-09 PROCEDURE — 25000003 PHARM REV CODE 250: Mod: HCNC | Performed by: STUDENT IN AN ORGANIZED HEALTH CARE EDUCATION/TRAINING PROGRAM

## 2024-04-09 PROCEDURE — 63600175 PHARM REV CODE 636 W HCPCS: Mod: HCNC | Performed by: STUDENT IN AN ORGANIZED HEALTH CARE EDUCATION/TRAINING PROGRAM

## 2024-04-09 PROCEDURE — 25000003 PHARM REV CODE 250: Mod: HCNC | Performed by: ORTHOPAEDIC SURGERY

## 2024-04-09 PROCEDURE — 0JBN0ZZ EXCISION OF RIGHT LOWER LEG SUBCUTANEOUS TISSUE AND FASCIA, OPEN APPROACH: ICD-10-PCS | Performed by: ORTHOPAEDIC SURGERY

## 2024-04-09 PROCEDURE — 63600175 PHARM REV CODE 636 W HCPCS: Mod: HCNC | Performed by: ORTHOPAEDIC SURGERY

## 2024-04-09 PROCEDURE — 94761 N-INVAS EAR/PLS OXIMETRY MLT: CPT | Mod: HCNC

## 2024-04-09 PROCEDURE — D9220A PRA ANESTHESIA: Mod: CRNA,,, | Performed by: STUDENT IN AN ORGANIZED HEALTH CARE EDUCATION/TRAINING PROGRAM

## 2024-04-09 PROCEDURE — 36415 COLL VENOUS BLD VENIPUNCTURE: CPT | Mod: HCNC | Performed by: ORTHOPAEDIC SURGERY

## 2024-04-09 PROCEDURE — 85025 COMPLETE CBC W/AUTO DIFF WBC: CPT | Mod: HCNC | Performed by: ORTHOPAEDIC SURGERY

## 2024-04-09 PROCEDURE — 63600175 PHARM REV CODE 636 W HCPCS: Mod: HCNC | Performed by: ANESTHESIOLOGY

## 2024-04-09 PROCEDURE — 37000008 HC ANESTHESIA 1ST 15 MINUTES: Mod: HCNC | Performed by: ORTHOPAEDIC SURGERY

## 2024-04-09 PROCEDURE — 36000704 HC OR TIME LEV I 1ST 15 MIN: Mod: HCNC | Performed by: ORTHOPAEDIC SURGERY

## 2024-04-09 RX ORDER — HYDROMORPHONE HYDROCHLORIDE 2 MG/ML
0.2 INJECTION, SOLUTION INTRAMUSCULAR; INTRAVENOUS; SUBCUTANEOUS EVERY 5 MIN PRN
Status: DISCONTINUED | OUTPATIENT
Start: 2024-04-09 | End: 2024-04-09 | Stop reason: HOSPADM

## 2024-04-09 RX ORDER — LORAZEPAM 2 MG/ML
0.25 INJECTION INTRAMUSCULAR ONCE AS NEEDED
Status: DISCONTINUED | OUTPATIENT
Start: 2024-04-09 | End: 2024-04-09 | Stop reason: HOSPADM

## 2024-04-09 RX ORDER — LIDOCAINE HYDROCHLORIDE 20 MG/ML
INJECTION INTRAVENOUS
Status: DISCONTINUED | OUTPATIENT
Start: 2024-04-09 | End: 2024-04-09

## 2024-04-09 RX ORDER — PROPOFOL 10 MG/ML
VIAL (ML) INTRAVENOUS
Status: DISCONTINUED | OUTPATIENT
Start: 2024-04-09 | End: 2024-04-09

## 2024-04-09 RX ORDER — MEPERIDINE HYDROCHLORIDE 50 MG/ML
12.5 INJECTION INTRAMUSCULAR; INTRAVENOUS; SUBCUTANEOUS EVERY 10 MIN PRN
Status: DISCONTINUED | OUTPATIENT
Start: 2024-04-09 | End: 2024-04-09 | Stop reason: HOSPADM

## 2024-04-09 RX ORDER — DIPHENHYDRAMINE HYDROCHLORIDE 50 MG/ML
25 INJECTION INTRAMUSCULAR; INTRAVENOUS EVERY 6 HOURS PRN
Status: DISCONTINUED | OUTPATIENT
Start: 2024-04-09 | End: 2024-04-09 | Stop reason: HOSPADM

## 2024-04-09 RX ORDER — FENTANYL CITRATE 50 UG/ML
INJECTION, SOLUTION INTRAMUSCULAR; INTRAVENOUS
Status: DISCONTINUED | OUTPATIENT
Start: 2024-04-09 | End: 2024-04-09

## 2024-04-09 RX ADMIN — LIDOCAINE HYDROCHLORIDE 40 MG: 20 INJECTION, SOLUTION INTRAVENOUS at 12:04

## 2024-04-09 RX ADMIN — FENTANYL CITRATE 25 MCG: 50 INJECTION, SOLUTION INTRAMUSCULAR; INTRAVENOUS at 12:04

## 2024-04-09 RX ADMIN — HYDROMORPHONE HYDROCHLORIDE 0.2 MG: 2 INJECTION INTRAMUSCULAR; INTRAVENOUS; SUBCUTANEOUS at 01:04

## 2024-04-09 RX ADMIN — HEPARIN SODIUM 5000 UNITS: 5000 INJECTION INTRAVENOUS; SUBCUTANEOUS at 08:04

## 2024-04-09 RX ADMIN — TAMSULOSIN HYDROCHLORIDE 0.8 MG: 0.4 CAPSULE ORAL at 09:04

## 2024-04-09 RX ADMIN — HYDROMORPHONE HYDROCHLORIDE 0.5 MG: 1 INJECTION, SOLUTION INTRAMUSCULAR; INTRAVENOUS; SUBCUTANEOUS at 01:04

## 2024-04-09 RX ADMIN — HYDROMORPHONE HYDROCHLORIDE 0.5 MG: 1 INJECTION, SOLUTION INTRAMUSCULAR; INTRAVENOUS; SUBCUTANEOUS at 07:04

## 2024-04-09 RX ADMIN — AMIODARONE HYDROCHLORIDE 400 MG: 200 TABLET ORAL at 09:04

## 2024-04-09 RX ADMIN — OXYCODONE AND ACETAMINOPHEN 1 TABLET: 10; 325 TABLET ORAL at 05:04

## 2024-04-09 RX ADMIN — CEFEPIME 1 G: 1 INJECTION, POWDER, FOR SOLUTION INTRAMUSCULAR; INTRAVENOUS at 08:04

## 2024-04-09 RX ADMIN — AMIODARONE HYDROCHLORIDE 400 MG: 200 TABLET ORAL at 08:04

## 2024-04-09 RX ADMIN — HEPARIN SODIUM 5000 UNITS: 5000 INJECTION INTRAVENOUS; SUBCUTANEOUS at 09:04

## 2024-04-09 RX ADMIN — NEPHROCAP 1 CAPSULE: 1 CAP ORAL at 09:04

## 2024-04-09 RX ADMIN — PROPOFOL 20 MG: 10 INJECTION, EMULSION INTRAVENOUS at 12:04

## 2024-04-09 RX ADMIN — ATORVASTATIN CALCIUM 80 MG: 40 TABLET, FILM COATED ORAL at 09:04

## 2024-04-09 RX ADMIN — FINASTERIDE 5 MG: 5 TABLET, FILM COATED ORAL at 09:04

## 2024-04-09 RX ADMIN — SODIUM CHLORIDE: 0.9 INJECTION, SOLUTION INTRAVENOUS at 12:04

## 2024-04-09 NOTE — PLAN OF CARE
Problem: Adult Inpatient Plan of Care  Goal: Plan of Care Review  Outcome: Ongoing, Progressing  Goal: Patient-Specific Goal (Individualized)  Outcome: Ongoing, Progressing  Goal: Absence of Hospital-Acquired Illness or Injury  Outcome: Ongoing, Progressing  Goal: Optimal Comfort and Wellbeing  Outcome: Ongoing, Progressing  Goal: Readiness for Transition of Care  Outcome: Ongoing, Progressing     Problem: Infection  Goal: Absence of Infection Signs and Symptoms  Outcome: Ongoing, Progressing     Problem: Diabetes Comorbidity  Goal: Blood Glucose Level Within Targeted Range  Outcome: Ongoing, Progressing     Problem: Device-Related Complication Risk (Hemodialysis)  Goal: Safe, Effective Therapy Delivery  Outcome: Ongoing, Progressing     Problem: Hemodynamic Instability (Hemodialysis)  Goal: Effective Tissue Perfusion  Outcome: Ongoing, Progressing     Problem: Infection (Hemodialysis)  Goal: Absence of Infection Signs and Symptoms  Outcome: Ongoing, Progressing     Problem: Skin Injury Risk Increased  Goal: Skin Health and Integrity  Outcome: Ongoing, Progressing     Problem: Impaired Wound Healing  Goal: Optimal Wound Healing  Outcome: Ongoing, Progressing     Problem: Fall Injury Risk  Goal: Absence of Fall and Fall-Related Injury  Outcome: Ongoing, Progressing     Problem: Pain Acute  Goal: Acceptable Pain Control and Functional Ability  Outcome: Ongoing, Progressing

## 2024-04-09 NOTE — PLAN OF CARE
REASSESSMENT:    On 4/4 ortho brought pt to OR and placed a wound vac.  On Friday 4/5, [facility] called [facility] to find out if pt was going home with the wound vac.  Dr. Choi was off. Dr. Sevilla's nurse said she would give him the message that a wound fax form needed to be filled out for a home wound vac if pt was going home with vac.    4/9  Patient went back to OR wound vac and dressing was changed. Unable to send messages through Secure Chat to ortho docs.      Patient still refusing to SNF.    TN talked with daughter Maddy agrees with home health [facility] that pt's insurance/humana covers:   [facility]  Requesting hospital bed and a wheel chair/says his is old.    Resume [facility] MWF    Plan A:  Home with home health  Plan B:  Home with home health     Barrier: Has been returning to OR. Need wound vac form completed by ortho for home use.  Hospital bed for home use and a wheel chair     04/09/24 5258   Discharge Reassessment   Assessment Type Discharge Planning Reassessment   Did the patient's condition or plan change since previous assessment? Yes   Discharge Plan discussed with: Patient   Discharge Plan A Home Health;Home with family   Discharge Plan B Home with family;Home Health   DME Needed Upon Discharge  hospital bed;wheelchair   Why the patient remains in the hospital Requires continued medical care   Post-Acute Status   Post-Acute Authorization Home Health;HME   Post-Acute Placement Status Patient declined/refused   HME Status Pending therapy documentation   Home Health Status Pending medical clearance/testing   Patient choice form signed by patient/caregiver List with quality metrics by geographic area provided

## 2024-04-09 NOTE — ASSESSMENT & PLAN NOTE
Patient's anemia is currently controlled. Has not received any PRBCs to date.   Lab Results   Component Value Date    HGB 7.8 (L) 04/09/2024    HCT 23.8 (L) 04/09/2024   Anemia of CKD plus anemia of acute blood loss as expected from surgical procedures.  Transfused 2 units of blood with adequate correction.  Continue to monitor

## 2024-04-09 NOTE — ASSESSMENT & PLAN NOTE
-continue wound care and wound VAC management.  -patient on IV antibiotics cefepime 1 g Q 24 hours  -wound culture grew E coli.  -patient was followed by ID and Orthopedics, input appreciated.  -when discharge with transition to p.o. antibiotics cefadroxil 1 g q.72 hours to complete a total of 10 days of treatment.

## 2024-04-09 NOTE — NURSING
Upon departure from floor to surgery: cardiac monitor in progress, patient awake, alert, and oriented x 4. No apparent distress noted at this time.

## 2024-04-09 NOTE — TRANSFER OF CARE
"Anesthesia Transfer of Care Note    Patient: Miguel Moore    Procedure(s) Performed: Procedure(s) (LRB):  INCISION AND DRAINAGE, LOWER EXTREMITY- FOOT & ANKLE (Right)  WOUND VAC EXCHANGE (Right)    Patient location: PACU    Anesthesia Type: general    Transport from OR: Transported from OR on 2-3 L/min O2 by NC with adequate spontaneous ventilation    Post pain: adequate analgesia    Post assessment: no apparent anesthetic complications and tolerated procedure well    Post vital signs: stable    Level of consciousness: lethargic    Nausea/Vomiting: no nausea/vomiting    Complications: none    Transfer of care protocol was followed      Last vitals: Visit Vitals  BP (!) 114/53 (BP Location: Right arm, Patient Position: Lying)   Pulse 81   Temp 37.2 °C (99 °F) (Temporal)   Resp (!) 28   Ht 5' 8" (1.727 m)   Wt 90.4 kg (199 lb 4.7 oz)   SpO2 100%   BMI 30.30 kg/m²     " sepsis secondary to uti

## 2024-04-09 NOTE — PT/OT/SLP PROGRESS
Occupational Therapy      Patient Name:  Miguel Moore   MRN:  [id number]    Patient not seen today secondary to pt HOLLY for procedure.  Will follow-up as able.    4/9/2024

## 2024-04-09 NOTE — PLAN OF CARE
Problem: Adult Inpatient Plan of Care  Goal: Plan of Care Review  Outcome: Ongoing, Progressing  Flowsheets (Taken 4/9/2024 0513)  Plan of Care Reviewed With: patient  Goal: Absence of Hospital-Acquired Illness or Injury  Outcome: Ongoing, Progressing     Problem: Infection  Goal: Absence of Infection Signs and Symptoms  Outcome: Ongoing, Progressing     Problem: Diabetes Comorbidity  Goal: Blood Glucose Level Within Targeted Range  Outcome: Ongoing, Progressing  Intervention: Monitor and Manage Glycemia  Flowsheets (Taken 4/9/2024 0513)  Glycemic Management: blood glucose monitored

## 2024-04-09 NOTE — SUBJECTIVE & OBJECTIVE
Interval History:  Patient was seen today for follow-up care.  Patient reports no chest pain or shortness a breath.  Patient was scheduled for repeat OR washout today of right ankle cellulitis/abscess, and wound VAC change.  Wound culture growing E coli.  Patient followed by ID and Orthopedics, input appreciated.  Recommended oral antibiotics on discharge for 10 days.        Review of Systems   Constitutional: Negative.    HENT: Negative.     Eyes: Negative.    Respiratory: Negative.     Cardiovascular: Negative.    Gastrointestinal: Negative.    Endocrine: Negative.    Genitourinary: Negative.    Musculoskeletal: Negative.    Skin: Negative.    Allergic/Immunologic: Negative.    Neurological: Negative.    Hematological: Negative.    Psychiatric/Behavioral: Negative.       Objective:     Vital Signs (Most Recent):  Temp: 98.3 °F (36.8 °C) (04/09/24 0807)  Pulse: 66 (04/09/24 0807)  Resp: 18 (04/09/24 0807)  BP: (!) 124/56 (04/09/24 0807)  SpO2: 100 % (04/09/24 0807) Vital Signs (24h Range):  Temp:  [98.3 °F (36.8 °C)-98.8 °F (37.1 °C)] 98.3 °F (36.8 °C)  Pulse:  [65-86] 66  Resp:  [18-20] 18  SpO2:  [92 %-100 %] 100 %  BP: (117-139)/(53-69) 124/56     Weight: 90.4 kg (199 lb 4.7 oz)  Body mass index is 30.3 kg/m².    Intake/Output Summary (Last 24 hours) at 4/9/2024 0941  Last data filed at 4/9/2024 0625  Gross per 24 hour   Intake 949.78 ml   Output 2230 ml   Net -1280.22 ml         Physical Exam  Vitals reviewed.   Constitutional:       Appearance: Normal appearance. He is normal weight.   HENT:      Head: Normocephalic.      Right Ear: External ear normal.      Left Ear: External ear normal.      Nose: Nose normal.      Mouth/Throat:      Mouth: Mucous membranes are moist.      Pharynx: Oropharynx is clear.   Eyes:      Extraocular Movements: Extraocular movements intact.      Conjunctiva/sclera: Conjunctivae normal.      Pupils: Pupils are equal, round, and reactive to light.   Cardiovascular:      Rate and  "Rhythm: Normal rate and regular rhythm.      Pulses: Normal pulses.      Heart sounds: Normal heart sounds.   Pulmonary:      Effort: Pulmonary effort is normal.      Breath sounds: Normal breath sounds.   Abdominal:      General: Abdomen is flat. Bowel sounds are normal.      Palpations: Abdomen is soft.   Musculoskeletal:         General: Normal range of motion.      Cervical back: Normal range of motion and neck supple.      Comments: S/P I&D of right ankle, with wound VAC in place.   Skin:     General: Skin is warm.      Capillary Refill: Capillary refill takes less than 2 seconds.   Neurological:      General: No focal deficit present.      Mental Status: He is alert and oriented to person, place, and time. Mental status is at baseline.      Cranial Nerves: No cranial nerve deficit.   Psychiatric:         Mood and Affect: Mood normal.         Behavior: Behavior normal.         Thought Content: Thought content normal.         Judgment: Judgment normal.             Significant Labs: All pertinent labs within the past 24 hours have been reviewed.  A1C:   Recent Labs   Lab 10/26/23  1439 03/20/24  1724   HGBA1C 5.9* 5.8*     Blood Culture: No results for input(s): "LABBLOO" in the last 48 hours.  CBC:   Recent Labs   Lab 04/08/24  0423 04/09/24  0443   WBC 11.65 12.71*   HGB 7.7* 7.8*   HCT 24.1* 23.8*    290     CMP:   Recent Labs   Lab 04/08/24  0423 04/09/24  0443   * 136   K 4.2 3.8    101   CO2 24 27   * 189*   BUN 47* 29*   CREATININE 8.5* 5.9*   CALCIUM 7.1* 7.5*   PROT 4.2* 4.9*   ALBUMIN 1.3* 1.3*   BILITOT 0.6 0.6   ALKPHOS 101 94   AST 47* 72*   ALT 21 24   ANIONGAP 8 8     Urine Culture: No results for input(s): "LABURIN" in the last 48 hours.    Significant Imaging: I have reviewed all pertinent imaging results/findings within the past 24 hours.  "

## 2024-04-09 NOTE — OP NOTE
Operative Note    Surgery Date: 4/9/2024     Surgeon:  Jimbo Montilla III, MD    Assistant:  Deneen Mccrary LPN, CST    Pre-op Diagnosis:  Right leg abscess with wound    Post-op Diagnosis:  Same    Procedure:  Irrigation debridement right leg abscess and wound, excisional.  Placement of wound VAC dressing.    Indications:  68-year-old male with end-stage renal disease on dialysis presents with right leg abscess.  He is on undergone 2 previous washouts.    Procedure in Detail:  After informed consent obtained the patient was brought to surgery.  IV sedation was administered.  The right leg was prepped and draped.  Excisional debridement was carried out of the wounds including some of the skin edges subcutaneous tissue fascia and extensor retinaculum.  Three resulting wounds were made 1 larger 1 anteriorly and to posterior smaller wounds.  They were thoroughly irrigated with Vashe with the pulse lavage system.  Hemostasis was obtained with electrocautery.  Wound VAC dressings were placed into the 3 wounds and then an outer layer was made to protect the skin.  A connecting layer of wound VAC sponge was placed on top of this and then it was sealed and applied to suction.  Three pieces of black sponge were implanted with 1 additional black sponge on the outside to connect the wounds together.  It was applied to suction had appropriate fit.  A superficial heel wound was dressed as well.  He was placed in sterile dressing and brought to recovery stable condition.    Estimated Blood Loss:  Minimal    Complications:  none         Pathology specimen:   Specimens (From admission, onward)      None              Jimbo Montilla MD  [facility]  This note has been transcribed with voice recognition software, but not reviewed and may contain unrecognized errors.

## 2024-04-09 NOTE — ANESTHESIA POSTPROCEDURE EVALUATION
Anesthesia Post Evaluation    Patient: Miguel Moore    Procedure(s) Performed: Procedure(s) (LRB):  INCISION AND DRAINAGE, LOWER EXTREMITY- FOOT & ANKLE (Right)  WOUND VAC EXCHANGE (Right)    Final Anesthesia Type: general      Patient location during evaluation: PACU  Patient participation: Yes- Able to Participate  Level of consciousness: awake and alert  Post-procedure vital signs: reviewed and stable  Pain management: adequate  Airway patency: patent    PONV status at discharge: No PONV  Anesthetic complications: no      Respiratory status: spontaneous ventilation and room air  Hydration status: euvolemic  Follow-up not needed.              Vitals Value Taken Time   /56 04/09/24 1334   Temp 37.2 °C (99 °F) 04/09/24 1316   Pulse 74 04/09/24 1345   Resp 13 04/09/24 1345   SpO2 100 % 04/09/24 1345         Event Time   Out of Recovery 04/09/2024 14:00:00         Pain/Skyler Score: Pain Rating Prior to Med Admin: 4 (4/9/2024  2:00 PM)  Pain Rating Post Med Admin: 4 (4/9/2024  2:00 PM)  Skylre Score: 9 (4/9/2024  2:00 PM)

## 2024-04-09 NOTE — NURSING
[facility] [place] 4 [place]      4/8/2024       Skin assessed on: Q Shift      [] No Pressure Injuries Present    [x]Prevention Measures Documented    [x] Yes LDA  for Pressure Injury Previously documented     [] Yes New Pressure Injury Discovered   [] LDA for New Pressure Injury Added      Attending RN:  Aman Feliz RN     Second RN:  DARRION Lopez       written material

## 2024-04-09 NOTE — PROGRESS NOTES
[facility] Medicine  Progress Note    Patient Name: Miguel Moore  MRN: [id number]  Patient Class: IP- Inpatient   Admission Date: 3/20/2024  Length of Stay: 20 days  Attending Physician: Hipolito Mooney MD  Primary Care Provider: Raciel Raymond MD        Subjective:     Principal Problem:Cellulitis of right foot        HPI:  Mr Miguel Moore is a 68 y.o. man with ESRD on HD, HTN, DM with neuropathy, history of CVA with residual R sided weakness, chronic DVT on eliquis who presented with feeling poorly. He attended his usual dialysis session on Monday 3/18 without issues. He developed fatigue and R ankle swelling. He has some pain with palpation but is able to walk. No reports of trauma. No wounds. No falls. No history of gout. Today he was feeling worse so did not go to dialysis and came to the ED. No fevers. No shortness of breath. Normal appetite. No nausea, vomiting. No chest pain. No palpitations.     In the ED, afebrile with HR initially controlled then to 130s Afib RVR. Started diltiazem but became hypotensive. Diltiazem stopped. Given antibiotics. Admitted for further management.     Overview/Hospital Course:  Mr Miguel Moore is a 68 y.o. man with ESRD on HD, DM who was admitted with new onset Afib RVR, hyperkalemia from missed HD session, and R ankle cellulitis. Started amio gtt with conversion to NSR. TTE EF 55-60%, mild LAE. Cardiology consulted. Now on PO amiodarone and continues home eliquis (on this for chronic DVT). He received HD with resolution of hyperkalemia. He is on CTX for his R ankle cellulitis and was given colchicine in case gout could contribute. He developed recurrent Afib and amio gtt resumed. Cardiology was planning SONY/DCCV on 3/22. But converted to sinus. Transferred to floor 3/26.   Right ankle cellulitis improving however spiking fevers on 3/27 up to 102.7 F.  Vanc/Ceftriaxone changed to Meropenem. CT right ankle with no obvious acute infection, pain is  improving. Right forearm with swelling, u/s negative for abscess or hematoma. Blood cultures negative. Possible medication related fevers and ABx's stopped. 4/4-Right ankle wound then had new abscess/discharge sp I& D with wound vac placement by Orthopedic Surgery.  Anemia of CKD plus anemia of acute blood loss as expected from surgical procedure and patient transfused blood.  Repeat OR I&D and wound vac back on. Operative wound culture grew e coli. ID following for ABX recommendations.  Patient was scheduled for repeat washout, and wound VAC change today.  Patient followed by ID and ortho, input appreciated.      Interval History:  Patient was seen today for follow-up care.  Patient reports no chest pain or shortness a breath.  Patient had a repeat OR washout 04/08/2024 of right ankle cellulitis/abscess, and wound VAC change.  Wound culture growing E coli.  Patient followed by ID and Orthopedics, input appreciated.  Recommended oral antibiotics on discharge for 10 days.        Review of Systems   Constitutional: Negative.    HENT: Negative.     Eyes: Negative.    Respiratory: Negative.     Cardiovascular: Negative.    Gastrointestinal: Negative.    Endocrine: Negative.    Genitourinary: Negative.    Musculoskeletal: Negative.    Skin: Negative.    Allergic/Immunologic: Negative.    Neurological: Negative.    Hematological: Negative.    Psychiatric/Behavioral: Negative.       Objective:     Vital Signs (Most Recent):  Temp: 98.3 °F (36.8 °C) (04/09/24 0807)  Pulse: 66 (04/09/24 0807)  Resp: 18 (04/09/24 0807)  BP: (!) 124/56 (04/09/24 0807)  SpO2: 100 % (04/09/24 0807) Vital Signs (24h Range):  Temp:  [98.3 °F (36.8 °C)-98.8 °F (37.1 °C)] 98.3 °F (36.8 °C)  Pulse:  [65-86] 66  Resp:  [18-20] 18  SpO2:  [92 %-100 %] 100 %  BP: (117-139)/(53-69) 124/56     Weight: 90.4 kg (199 lb 4.7 oz)  Body mass index is 30.3 kg/m².    Intake/Output Summary (Last 24 hours) at 4/9/2024 1357  Last data filed at 4/9/2024 0917  Gross  "per 24 hour   Intake 949.78 ml   Output 2230 ml   Net -1280.22 ml         Physical Exam  Vitals reviewed.   Constitutional:       Appearance: Normal appearance. He is normal weight.   HENT:      Head: Normocephalic.      Right Ear: External ear normal.      Left Ear: External ear normal.      Nose: Nose normal.      Mouth/Throat:      Mouth: Mucous membranes are moist.      Pharynx: Oropharynx is clear.   Eyes:      Extraocular Movements: Extraocular movements intact.      Conjunctiva/sclera: Conjunctivae normal.      Pupils: Pupils are equal, round, and reactive to light.   Cardiovascular:      Rate and Rhythm: Normal rate and regular rhythm.      Pulses: Normal pulses.      Heart sounds: Normal heart sounds.   Pulmonary:      Effort: Pulmonary effort is normal.      Breath sounds: Normal breath sounds.   Abdominal:      General: Abdomen is flat. Bowel sounds are normal.      Palpations: Abdomen is soft.   Musculoskeletal:         General: Normal range of motion.      Cervical back: Normal range of motion and neck supple.      Comments: S/P I&D of right ankle, with wound VAC in place.   Skin:     General: Skin is warm.      Capillary Refill: Capillary refill takes less than 2 seconds.   Neurological:      General: No focal deficit present.      Mental Status: He is alert and oriented to person, place, and time. Mental status is at baseline.      Cranial Nerves: No cranial nerve deficit.   Psychiatric:         Mood and Affect: Mood normal.         Behavior: Behavior normal.         Thought Content: Thought content normal.         Judgment: Judgment normal.             Significant Labs: All pertinent labs within the past 24 hours have been reviewed.  A1C:   Recent Labs   Lab 10/26/23  1439 03/20/24  1724   HGBA1C 5.9* 5.8*     Blood Culture: No results for input(s): "LABBLOO" in the last 48 hours.  CBC:   Recent Labs   Lab 04/08/24  0423 04/09/24  0443   WBC 11.65 12.71*   HGB 7.7* 7.8*   HCT 24.1* 23.8*    " "290     CMP:   Recent Labs   Lab 04/08/24  0423 04/09/24  0443   * 136   K 4.2 3.8    101   CO2 24 27   * 189*   BUN 47* 29*   CREATININE 8.5* 5.9*   CALCIUM 7.1* 7.5*   PROT 4.2* 4.9*   ALBUMIN 1.3* 1.3*   BILITOT 0.6 0.6   ALKPHOS 101 94   AST 47* 72*   ALT 21 24   ANIONGAP 8 8     Urine Culture: No results for input(s): "LABURIN" in the last 48 hours.    Significant Imaging: I have reviewed all pertinent imaging results/findings within the past 24 hours.    Assessment/Plan:      * Cellulitis/Abscess  of right foot/ankle  R ankle pain, swelling, warmth present. Potential gout vs cellulitis. 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 No fracture present. Uric acid normal. Arterial US and venous US with no clots, appropriate flow  - on ceftriaxone/vanc for 7 days and still with pain and now with increased WBC and fevers  - blood cultures currently NGTD  - CT right ankle with no evidence of osteo.  -4/4-Right ankle wound then had new abscess/discharge sp I& D with wound vac placement  -4/6-Repeat OR I&D and wound vac back on. OP wound culture grew e coli. Leukocytosis stable, afebrile.   Per ID ok to transition to po cephalosporin (cefadroxil, 1 gram q72H) to complete 10 days of abx treatment on discharge.      Open wound  -continue wound care and wound VAC management.  -patient on IV antibiotics cefepime 1 g Q 24 hours  -wound culture grew E coli.  -patient was followed by ID and Orthopedics, input appreciated.  -when discharge with transition to p.o. antibiotics cefadroxil 1 g q.72 hours to complete a total of 10 days of treatment.      Paroxysmal atrial fibrillation with RVR  Patient has Paroxysmal (<7 days) atrial fibrillation which is uncontrolled. Patient is currently in atrial fibrillation.  - new onset Afib  - on labetalol for BP at home, eliquis for chronic DVT but did miss some doses of eliquis  - in ED given diltiazem gtt but that caused hypotension  - EKG " with Afib RVR with normal Qtc  - started amiodarone with conversion to NSR. Changed to PO amiodarone  - however, he had recurrent Afib and was placed back on amio gtt.   - Cardiology consulted  - plan for SONY/DCCV on 3/22/24 but converted to sinus rhythm. Has been in/out of afib but rate controlled  - continue home eliquis  Hold Eliquis for I&D and possible future procedures.  Restart once done with procedures.    Anemia in chronic kidney disease  Patient's anemia is currently controlled. Has not received any PRBCs to date.   Lab Results   Component Value Date    HGB 7.8 (L) 04/09/2024    HCT 23.8 (L) 04/09/2024   Anemia of CKD plus anemia of acute blood loss as expected from surgical procedures.  Transfused 2 units of blood with adequate correction.  Continue to monitor    Chronic deep vein thrombosis (DVT) of popliteal vein of right lower extremity 9/21/20 outside US; unprovoked; anticoagulation  Continue holding as patient may need repeat procedure.  Heparin for DVT prophylaxis.  -restart apixaban once procedures done.      History of stroke  PT/OT evaluation completed and recommended moderate intensity therapy  Case management will assist    Secondary hyperparathyroidism of renal origin  Continue renvela       ESRD (end stage renal disease)  ESRD via LUE AVF. Last session 3/18/24. Missed 3/20/24 due to fatigue. Usually MWF  - hyperkalemic on admit. Does not appear volume overloaded.   - Nephrology consulted.  - continue MWF HD    Hemiplegia and hemiparesis following cerebral infarction affecting right dominant side  No signs of acute stroke. Does have RUE weakness.   - continue home asa, statin  - resume apixaban once done with procedures.      Seizure disorder as sequela of cerebrovascular accident  Not currently on antiepileptics. No seizure activity reported. Monitor.       Controlled type 2 diabetes mellitus with chronic kidney disease on chronic dialysis, with long-term current use of insulin  A1c:   Lab  Results   Component Value Date    HGBA1C 5.8 (H) 03/20/2024     Meds: SSI PRN to maintain goal 140-180  ADA renal diet, accuchecks, hypoglycemic protocol      Dyslipidemia  Continue statin      Benign essential HTN  Chronic. Latest blood pressure and vitals reviewed-     Temp:  [98.3 °F (36.8 °C)-98.8 °F (37.1 °C)]   Pulse:  [65-84]   Resp:  [18]   BP: (117-139)/(53-69)   SpO2:  [92 %-100 %] .   Home meds for hypertension were reviewed and noted below.   Hypertension Medications               amLODIPine (NORVASC) 10 MG tablet Take 1 tablet by mouth once daily    labetaloL (NORMODYNE) 100 MG tablet Take 1 tablet (100 mg total) by mouth once daily.          Continue current management.    Will utilize p.r.n. blood pressure medication only if patient's blood pressure greater than 180/110 and he develops symptoms such as worsening chest pain or shortness of breath.      VTE Risk Mitigation (From admission, onward)           Ordered     heparin (porcine) injection 5,000 Units  Every 12 hours         04/04/24 1602                    Discharge Planning   GARY:      Code Status: Full Code   Is the patient medically ready for discharge?:     Reason for patient still in hospital (select all that apply): Patient trending condition, Treatment, Consult recommendations, PT / OT recommendations, and Pending disposition  Discharge Plan A: Home Health, Home with family   Discharge Delays: (!) Procedure Scheduling (IR, OR, Labs, Echo, Cath, Echo, EEG)              Hipolito Mooney MD  Department of Hospital Medicine   [facility] - Med Surg

## 2024-04-09 NOTE — NURSING
[facility] [place]urses [place] -- 4 Eyes      4/9/2024       Skin assessed on: Q Shift      [] No Pressure Injuries Present    []Prevention Measures Documented    [x] Yes LDA  for Pressure Injury Previously documented     [] Yes New Pressure Injury Discovered   [] LDA for New Pressure Injury Added      Attending RN:  Cherry Payne RN     Second RN:  Aman

## 2024-04-09 NOTE — PT/OT/SLP PROGRESS
"Physical Therapy      Patient Name:  Miguel Moore   MRN:  [id number]    Patient not seen today secondary to Patient unwilling to participate, Other (Comment) ("I haven't had anything to eat or drink"). Will follow-up later today as able and available.    "

## 2024-04-09 NOTE — PROGRESS NOTES
Miguel Moore is a 68 y.o. male patient.    Follow for ESRD, dialysis    Await washout and wound VAC change today  No new c/o, comfortable    Scheduled Meds:   sodium chloride 0.9%   Intravenous Once    amiodarone  400 mg Oral BID    atorvastatin  80 mg Oral Daily    ceFEPime IV (PEDS and ADULTS)  1 g Intravenous Q24H    epoetin marisol-epbx  5,000 Units Subcutaneous Every Mon, Wed, Fri    finasteride  5 mg Oral Daily    heparin (porcine)  5,000 Units Subcutaneous Q12H    tamsulosin  2 capsule Oral Daily    vitamin renal formula (B-complex-vitamin c-folic acid)  1 capsule Oral Daily       Review of patient's allergies indicates:   Allergen Reactions    Ace inhibitors      Hyperkalemia 8/2018  Other reaction(s): Unknown    Penicillins Hives     Tolerated cefepime and cefdinir previously    Tizanidine      Felt hot         Vital Signs Range (Last 24H):  Temp:  [98.3 °F (36.8 °C)-98.8 °F (37.1 °C)]   Pulse:  [65-86]   Resp:  [18-20]   BP: (117-139)/(53-69)   SpO2:  [92 %-100 %]     I & O (Last 24H):  Intake/Output Summary (Last 24 hours) at 4/9/2024 0827  Last data filed at 4/9/2024 0625  Gross per 24 hour   Intake 949.78 ml   Output 2230 ml   Net -1280.22 ml           Physical Exam:  General appearance: well developed, well nourished, no distress  Lungs:  clear to auscultation bilaterally and normal respiratory effort  Heart: regular rate and rhythm  Abdomen:  soft, normal bowel sounds  Extremities: edema trace    Laboratory:  I have reviewed all pertinent lab results within the past 24 hours.  CBC:   Recent Labs   Lab 04/09/24  0443   WBC 12.71*   RBC 3.00*   HGB 7.8*   HCT 23.8*      MCV 79*   MCH 26.0*   MCHC 32.8     CMP:   Recent Labs   Lab 04/09/24  0443   *   CALCIUM 7.5*   ALBUMIN 1.3*   PROT 4.9*      K 3.8   CO2 27      BUN 29*   CREATININE 5.9*   ALKPHOS 94   ALT 24   AST 72*   BILITOT 0.6       Assessment & Plan    ESRD  HTN  DM type 2  (R) foot cellulitis/abscess s/p I&D, to  OR for washout/wound VAC change today  PAF  Anemia in CKD    HD q MWF  We'll follow for dialysis  Continue present Rx      Trac T Le  4/9/2024

## 2024-04-09 NOTE — ASSESSMENT & PLAN NOTE
Chronic. Latest blood pressure and vitals reviewed-     Temp:  [98.3 °F (36.8 °C)-98.8 °F (37.1 °C)]   Pulse:  [65-84]   Resp:  [18]   BP: (117-139)/(53-69)   SpO2:  [92 %-100 %] .   Home meds for hypertension were reviewed and noted below.   Hypertension Medications               amLODIPine (NORVASC) 10 MG tablet Take 1 tablet by mouth once daily    labetaloL (NORMODYNE) 100 MG tablet Take 1 tablet (100 mg total) by mouth once daily.          Continue current management.    Will utilize p.r.n. blood pressure medication only if patient's blood pressure greater than 180/110 and he develops symptoms such as worsening chest pain or shortness of breath.

## 2024-04-09 NOTE — PROGRESS NOTES
The patient was brought to surgery and underwent I and D and wound VAC dressing.  I anticipate that he will be able to have wound VAC dressing changes with wound care and will not plan on additional surgery unless things worsen.

## 2024-04-09 NOTE — PLAN OF CARE
Problem: Adult Inpatient Plan of Care  Goal: Plan of Care Review  Outcome: Ongoing, Progressing  Flowsheets (Taken 4/9/2024 0815)  Plan of Care Reviewed With: patient  Goal: Patient-Specific Goal (Individualized)  Outcome: Ongoing, Progressing     Problem: Diabetes Comorbidity  Goal: Blood Glucose Level Within Targeted Range  Outcome: Ongoing, Progressing  Intervention: Monitor and Manage Glycemia  Flowsheets (Taken 4/9/2024 0815)  Glycemic Management:   blood glucose monitored   carbohydrate replacement provided   oral hydration promoted   supplemental insulin given     Problem: Adult Inpatient Plan of Care  Goal: Plan of Care Review  Outcome: Ongoing, Progressing  Flowsheets (Taken 4/9/2024 0815)  Plan of Care Reviewed With: patient     Problem: Adult Inpatient Plan of Care  Goal: Plan of Care Review  Outcome: Ongoing, Progressing  Flowsheets (Taken 4/9/2024 0815)  Plan of Care Reviewed With: patient     Problem: Adult Inpatient Plan of Care  Goal: Patient-Specific Goal (Individualized)  Outcome: Ongoing, Progressing     Problem: Adult Inpatient Plan of Care  Goal: Patient-Specific Goal (Individualized)  Outcome: Ongoing, Progressing     Problem: Diabetes Comorbidity  Goal: Blood Glucose Level Within Targeted Range  Outcome: Ongoing, Progressing  Intervention: Monitor and Manage Glycemia  Flowsheets (Taken 4/9/2024 0815)  Glycemic Management:   blood glucose monitored   carbohydrate replacement provided   oral hydration promoted   supplemental insulin given     Problem: Diabetes Comorbidity  Goal: Blood Glucose Level Within Targeted Range  Outcome: Ongoing, Progressing     Problem: Diabetes Comorbidity  Goal: Blood Glucose Level Within Targeted Range  Intervention: Monitor and Manage Glycemia  Flowsheets (Taken 4/9/2024 0815)  Glycemic Management:   blood glucose monitored   carbohydrate replacement provided   oral hydration promoted   supplemental insulin given     Problem: Diabetes Comorbidity  Goal: Blood  Glucose Level Within Targeted Range  Intervention: Monitor and Manage Glycemia  Flowsheets (Taken 4/9/2024 0815)  Glycemic Management:   blood glucose monitored   carbohydrate replacement provided   oral hydration promoted   supplemental insulin given

## 2024-04-09 NOTE — ANESTHESIA PREPROCEDURE EVALUATION
04/09/2024  Miguel Moore is a 68 y.o., male.      Pre-op Assessment     I have reviewed the Nursing Notes.    I have reviewed the Medications.     Review of Systems  Anesthesia Hx:  No problems with previous Anesthesia             Denies Family Hx of Anesthesia complications.     Social:  Non-Smoker, No Alcohol Use       Hematology/Oncology:  Hematology Normal   Oncology Normal                                   EENT/Dental:  EENT/Dental Normal           Cardiovascular:  Exercise tolerance: good   Hypertension                                  Hypertension     Atrial Fibrillation     Pulmonary:  Pulmonary Normal                       Renal/:  Chronic Renal Disease        Kidney Function/Disease             Hepatic/GI:  Hepatic/GI Normal                 Musculoskeletal:  Musculoskeletal Normal                Neurological:   CVA    Seizures           Seizure Disorder             CVA - Cerebrovasular Accident                 Endocrine:  Diabetes    Diabetes                          Physical Exam  General: Well nourished    Airway:  Mallampati: II / II  Mouth Opening: Normal  TM Distance: Normal  Tongue: Normal  Neck ROM: Normal ROM    Dental:  Intact        Anesthesia Plan  Type of Anesthesia, risks & benefits discussed:    Anesthesia Type: Gen ETT, Gen Natural Airway  Intra-op Monitoring Plan: Standard ASA Monitors  Post Op Pain Control Plan: multimodal analgesia  Induction:  IV  Airway Plan: Direct, Post-Induction  Informed Consent: Informed consent signed with the Patient and all parties understand the risks and agree with anesthesia plan.  All questions answered.   ASA Score: 3    Ready For Surgery From Anesthesia Perspective.     .

## 2024-04-10 LAB
ALBUMIN SERPL BCP-MCNC: 1.4 G/DL (ref 3.5–5.2)
ALP SERPL-CCNC: 87 U/L (ref 55–135)
ALT SERPL W/O P-5'-P-CCNC: 37 U/L (ref 10–44)
ANION GAP SERPL CALC-SCNC: 13 MMOL/L (ref 8–16)
AST SERPL-CCNC: 98 U/L (ref 10–40)
BASOPHILS # BLD AUTO: 0.07 K/UL (ref 0–0.2)
BASOPHILS NFR BLD: 0.5 % (ref 0–1.9)
BILIRUB SERPL-MCNC: 0.8 MG/DL (ref 0.1–1)
BUN SERPL-MCNC: 38 MG/DL (ref 8–23)
CALCIUM SERPL-MCNC: 7.8 MG/DL (ref 8.7–10.5)
CHLORIDE SERPL-SCNC: 99 MMOL/L (ref 95–110)
CO2 SERPL-SCNC: 23 MMOL/L (ref 23–29)
CREAT SERPL-MCNC: 7.4 MG/DL (ref 0.5–1.4)
DIFFERENTIAL METHOD BLD: ABNORMAL
EOSINOPHIL # BLD AUTO: 0.1 K/UL (ref 0–0.5)
EOSINOPHIL NFR BLD: 0.8 % (ref 0–8)
ERYTHROCYTE [DISTWIDTH] IN BLOOD BY AUTOMATED COUNT: 19.4 % (ref 11.5–14.5)
EST. GFR  (NO RACE VARIABLE): 7 ML/MIN/1.73 M^2
GLUCOSE SERPL-MCNC: 162 MG/DL (ref 70–110)
HCT VFR BLD AUTO: 23.5 % (ref 40–54)
HGB BLD-MCNC: 7.4 G/DL (ref 14–18)
IMM GRANULOCYTES # BLD AUTO: 0.23 K/UL (ref 0–0.04)
IMM GRANULOCYTES NFR BLD AUTO: 1.6 % (ref 0–0.5)
LYMPHOCYTES # BLD AUTO: 1.1 K/UL (ref 1–4.8)
LYMPHOCYTES NFR BLD: 7.5 % (ref 18–48)
MCH RBC QN AUTO: 26 PG (ref 27–31)
MCHC RBC AUTO-ENTMCNC: 31.5 G/DL (ref 32–36)
MCV RBC AUTO: 83 FL (ref 82–98)
MONOCYTES # BLD AUTO: 1 K/UL (ref 0.3–1)
MONOCYTES NFR BLD: 6.8 % (ref 4–15)
NEUTROPHILS # BLD AUTO: 12.1 K/UL (ref 1.8–7.7)
NEUTROPHILS NFR BLD: 82.8 % (ref 38–73)
NRBC BLD-RTO: 0 /100 WBC
PLATELET # BLD AUTO: 320 K/UL (ref 150–450)
PMV BLD AUTO: 9.3 FL (ref 9.2–12.9)
POCT GLUCOSE: 135 MG/DL (ref 70–110)
POCT GLUCOSE: 155 MG/DL (ref 70–110)
POCT GLUCOSE: 174 MG/DL (ref 70–110)
POCT GLUCOSE: 225 MG/DL (ref 70–110)
POTASSIUM SERPL-SCNC: 4.4 MMOL/L (ref 3.5–5.1)
PROT SERPL-MCNC: 5.4 G/DL (ref 6–8.4)
RBC # BLD AUTO: 2.85 M/UL (ref 4.6–6.2)
SODIUM SERPL-SCNC: 135 MMOL/L (ref 136–145)
WBC # BLD AUTO: 14.64 K/UL (ref 3.9–12.7)

## 2024-04-10 PROCEDURE — 36415 COLL VENOUS BLD VENIPUNCTURE: CPT | Mod: HCNC | Performed by: ORTHOPAEDIC SURGERY

## 2024-04-10 PROCEDURE — 63600175 PHARM REV CODE 636 W HCPCS: Mod: HCNC | Performed by: ORTHOPAEDIC SURGERY

## 2024-04-10 PROCEDURE — 80053 COMPREHEN METABOLIC PANEL: CPT | Mod: HCNC | Performed by: ORTHOPAEDIC SURGERY

## 2024-04-10 PROCEDURE — 97110 THERAPEUTIC EXERCISES: CPT | Mod: HCNC

## 2024-04-10 PROCEDURE — 63600175 PHARM REV CODE 636 W HCPCS: Mod: JG,HCNC | Performed by: ORTHOPAEDIC SURGERY

## 2024-04-10 PROCEDURE — 80100016 HC MAINTENANCE HEMODIALYSIS: Mod: HCNC

## 2024-04-10 PROCEDURE — 25000003 PHARM REV CODE 250: Mod: HCNC | Performed by: ORTHOPAEDIC SURGERY

## 2024-04-10 PROCEDURE — 97530 THERAPEUTIC ACTIVITIES: CPT | Mod: HCNC

## 2024-04-10 PROCEDURE — 21400001 HC TELEMETRY ROOM: Mod: HCNC

## 2024-04-10 PROCEDURE — 85025 COMPLETE CBC W/AUTO DIFF WBC: CPT | Mod: HCNC | Performed by: ORTHOPAEDIC SURGERY

## 2024-04-10 RX ADMIN — EPOETIN ALFA-EPBX 5000 UNITS: 10000 INJECTION, SOLUTION INTRAVENOUS; SUBCUTANEOUS at 08:04

## 2024-04-10 RX ADMIN — TAMSULOSIN HYDROCHLORIDE 0.8 MG: 0.4 CAPSULE ORAL at 08:04

## 2024-04-10 RX ADMIN — AMIODARONE HYDROCHLORIDE 400 MG: 200 TABLET ORAL at 09:04

## 2024-04-10 RX ADMIN — ONDANSETRON 4 MG: 2 INJECTION INTRAMUSCULAR; INTRAVENOUS at 07:04

## 2024-04-10 RX ADMIN — OXYCODONE AND ACETAMINOPHEN 1 TABLET: 10; 325 TABLET ORAL at 07:04

## 2024-04-10 RX ADMIN — AMIODARONE HYDROCHLORIDE 400 MG: 200 TABLET ORAL at 08:04

## 2024-04-10 RX ADMIN — FINASTERIDE 5 MG: 5 TABLET, FILM COATED ORAL at 08:04

## 2024-04-10 RX ADMIN — HEPARIN SODIUM 5000 UNITS: 5000 INJECTION INTRAVENOUS; SUBCUTANEOUS at 08:04

## 2024-04-10 RX ADMIN — ATORVASTATIN CALCIUM 80 MG: 40 TABLET, FILM COATED ORAL at 08:04

## 2024-04-10 RX ADMIN — HYDROMORPHONE HYDROCHLORIDE 0.5 MG: 1 INJECTION, SOLUTION INTRAMUSCULAR; INTRAVENOUS; SUBCUTANEOUS at 05:04

## 2024-04-10 RX ADMIN — HEPARIN SODIUM 5000 UNITS: 5000 INJECTION INTRAVENOUS; SUBCUTANEOUS at 09:04

## 2024-04-10 RX ADMIN — OXYCODONE AND ACETAMINOPHEN 1 TABLET: 10; 325 TABLET ORAL at 02:04

## 2024-04-10 RX ADMIN — NEPHROCAP 1 CAPSULE: 1 CAP ORAL at 08:04

## 2024-04-10 RX ADMIN — INSULIN ASPART 1 UNITS: 100 INJECTION, SOLUTION INTRAVENOUS; SUBCUTANEOUS at 09:04

## 2024-04-10 RX ADMIN — OXYCODONE AND ACETAMINOPHEN 1 TABLET: 10; 325 TABLET ORAL at 09:04

## 2024-04-10 RX ADMIN — CEFEPIME 1 G: 1 INJECTION, POWDER, FOR SOLUTION INTRAMUSCULAR; INTRAVENOUS at 09:04

## 2024-04-10 NOTE — NURSING
Upon arrival to floor from dialysis: cardiac monitoring in progress, patient oriented to room, call bell in reach and bed in lowest position. No apparent distress noted.

## 2024-04-10 NOTE — NURSING
[facility]  Nurses Note -- 4 Eyes      4/9/2024       Skin assessed on: Q Shift      [] No Pressure Injuries Present    [x]Prevention Measures Documented    [x] Yes LDA  for Pressure Injury Previously documented     [] Yes New Pressure Injury Discovered   [] LDA for New Pressure Injury Added      Attending RN:  Aman Feliz RN     Second RN:  DARRION Carey

## 2024-04-10 NOTE — SUBJECTIVE & OBJECTIVE
Interval History:  Patient was seen today for follow-up care.  Patient reports no chest pain or shortness a breath.  Patient had repeat washout of right yesterday of the right ankle cellulitis/abscess, and wound VAC change.  Wound culture growing E coli.  Patient followed by ID and Orthopedics, input appreciated.  Recommended oral antibiotics on discharge for 10 days.  Possible discharge home in the next few days, on Friday if cleared by Orthopedics.  He will need home health care with wound care and wound VAC to be set up.   to assist with discharge planning.        Review of Systems   Constitutional: Negative.    HENT: Negative.     Eyes: Negative.    Respiratory: Negative.     Cardiovascular: Negative.    Gastrointestinal: Negative.    Endocrine: Negative.    Genitourinary: Negative.    Musculoskeletal: Negative.    Skin: Negative.    Allergic/Immunologic: Negative.    Neurological: Negative.    Hematological: Negative.    Psychiatric/Behavioral: Negative.       Objective:     Vital Signs (Most Recent):  Temp: 98.5 °F (36.9 °C) (04/10/24 0720)  Pulse: 77 (04/10/24 0720)  Resp: 18 (04/10/24 0720)  BP: 128/62 (04/10/24 0720)  SpO2: (!) 94 % (04/10/24 0720) Vital Signs (24h Range):  Temp:  [98.2 °F (36.8 °C)-99.8 °F (37.7 °C)] 98.5 °F (36.9 °C)  Pulse:  [65-83] 77  Resp:  [13-28] 18  SpO2:  [94 %-100 %] 94 %  BP: (104-128)/(53-62) 128/62     Weight: 90.4 kg (199 lb 4.7 oz)  Body mass index is 30.3 kg/m².    Intake/Output Summary (Last 24 hours) at 4/10/2024 1033  Last data filed at 4/10/2024 0942  Gross per 24 hour   Intake 420 ml   Output 0 ml   Net 420 ml           Physical Exam  Vitals reviewed.   Constitutional:       Appearance: Normal appearance. He is normal weight.   HENT:      Head: Normocephalic.      Right Ear: External ear normal.      Left Ear: External ear normal.      Nose: Nose normal.      Mouth/Throat:      Mouth: Mucous membranes are moist.      Pharynx: Oropharynx is clear.   Eyes:     "  Extraocular Movements: Extraocular movements intact.      Conjunctiva/sclera: Conjunctivae normal.      Pupils: Pupils are equal, round, and reactive to light.   Cardiovascular:      Rate and Rhythm: Normal rate and regular rhythm.      Pulses: Normal pulses.      Heart sounds: Normal heart sounds.   Pulmonary:      Effort: Pulmonary effort is normal.      Breath sounds: Normal breath sounds.   Abdominal:      General: Abdomen is flat. Bowel sounds are normal.      Palpations: Abdomen is soft.   Musculoskeletal:         General: Normal range of motion.      Cervical back: Normal range of motion and neck supple.      Comments: S/P I&D of right ankle, with wound VAC in place.   Skin:     General: Skin is warm.      Capillary Refill: Capillary refill takes less than 2 seconds.   Neurological:      General: No focal deficit present.      Mental Status: He is alert and oriented to person, place, and time. Mental status is at baseline.      Cranial Nerves: No cranial nerve deficit.   Psychiatric:         Mood and Affect: Mood normal.         Behavior: Behavior normal.         Thought Content: Thought content normal.         Judgment: Judgment normal.             Significant Labs: All pertinent labs within the past 24 hours have been reviewed.  A1C:   Recent Labs   Lab 10/26/23  1439 03/20/24  1724   HGBA1C 5.9* 5.8*       Blood Culture: No results for input(s): "LABBLOO" in the last 48 hours.  CBC:   Recent Labs   Lab 04/09/24  0443 04/10/24  0459   WBC 12.71* 14.64*   HGB 7.8* 7.4*   HCT 23.8* 23.5*    320       CMP:   Recent Labs   Lab 04/09/24  0443 04/10/24  0459    135*   K 3.8 4.4    99   CO2 27 23   * 162*   BUN 29* 38*   CREATININE 5.9* 7.4*   CALCIUM 7.5* 7.8*   PROT 4.9* 5.4*   ALBUMIN 1.3* 1.4*   BILITOT 0.6 0.8   ALKPHOS 94 87   AST 72* 98*   ALT 24 37   ANIONGAP 8 13       Urine Culture: No results for input(s): "LABURIN" in the last 48 hours.    Significant Imaging: I have " reviewed all pertinent imaging results/findings within the past 24 hours.

## 2024-04-10 NOTE — PT/OT/SLP PROGRESS
"Physical Therapy Treatment    Patient Name:  Miguel Moore   MRN:  [id number]    Recommendations:     Discharge Recommendations: Moderate Intensity Therapy  Discharge Equipment Recommendations: cane, quad      Assessment:     Miguel Moore is a 68 y.o. male admitted with a medical diagnosis of Cellulitis of right foot.  He presents with the following impairments/functional limitations: weakness, impaired endurance, impaired functional mobility, gait instability, impaired balance, decreased lower extremity function, decreased safety awareness, pain, abnormal tone, impaired coordination, impaired skin, edema, impaired joint extensibility, impaired muscle length.    Rehab Prognosis: Fair; patient would benefit from acute skilled PT services to address these deficits and reach maximum level of function.    Recent Surgery: Procedure(s) (LRB):  INCISION AND DRAINAGE, LOWER EXTREMITY- FOOT & ANKLE (Right)  WOUND VAC EXCHANGE (Right) 1 Day Post-Op    Plan:     During this hospitalization, patient to be seen 3 x/week to address the identified rehab impairments via gait training, therapeutic activities, therapeutic exercises and progress toward the following goals:    Plan of Care Expires:  04/17/24    Subjective     Chief Complaint: "I need a new diaper"  Patient/Family Comments/goals: Pt agreeable to therapy treatments.  Pain/Comfort:  Pain Rating 1: 7/10  Location - Side 1: Left  Location 1: foot  Pain Addressed 1: Reposition      Objective:     Patient found HOB elevated with telemetry, wound vac upon PT entry to room. Pt with lunch tray available however, declines to eat.    General Precautions: Standard, fall  Orthopedic Precautions: N/A  Braces: N/A  Respiratory Status: Room air     Functional Mobility:  Bed Mobility:     Supine to Sit: total assistance and of 2 persons  Sit to Supine: total assistance and of 2 persons  Balance: Fair sitting EOB  Stood x1 TA x2 to change draw sheet and bed linen.      AM-PAC 6 CLICK " MOBILITY  Turning over in bed (including adjusting bedclothes, sheets and blankets)?: 2  Sitting down on and standing up from a chair with arms (e.g., wheelchair, bedside commode, etc.): 1  Moving from lying on back to sitting on the side of the bed?: 2  Moving to and from a bed to a chair (including a wheelchair)?: 1  Need to walk in hospital room?: 1  Climbing 3-5 steps with a railing?: 1  Basic Mobility Total Score: 8       Treatment & Education:  Pt transferred to EOB.  Performed 1x10 reps BLE LAQ.PT/OT changed bed linen.    Patient left  (R) sidelying with wedge on (L) side  with all lines intact, call button in reach, bed alarm on, and all needs in reach .    GOALS:   Multidisciplinary Problems       Physical Therapy Goals          Problem: Physical Therapy    Goal Priority Disciplines Outcome Goal Variances Interventions   Physical Therapy Goal     PT, PT/OT Ongoing, Not Progressing     Description: Goals to be met by:      Patient will increase functional independence with mobility by performin. Supine to sit with Modified [place]  2. Sit to supine with Modified [place]  3. Sit to stand transfer with Modified [place]  4. Gait  x 100 feet with Modified [place] using Quad Cane.    Recommend: Moderate Intensity Therapy at time of discharge.                             Time Tracking:     PT Received On: 04/10/24  PT Start Time: 1440     PT Stop Time: 1503  PT Total Time (min): 23 min     Billable Minutes: Therapeutic Activity 15 and Therapeutic Exercise 8    Treatment Type: Treatment  PT/PTA: PT     Number of PTA visits since last PT visit: 1     04/10/2024

## 2024-04-10 NOTE — PLAN OF CARE
Problem: Adult Inpatient Plan of Care  Goal: Plan of Care Review  Outcome: Ongoing, Progressing  Flowsheets (Taken 4/10/2024 0537)  Plan of Care Reviewed With: patient  Goal: Optimal Comfort and Wellbeing  Outcome: Ongoing, Progressing  Intervention: Monitor Pain and Promote Comfort  Flowsheets (Taken 4/10/2024 0537)  Pain Management Interventions:   pillow support provided   quiet environment facilitated   position adjusted     Problem: Infection  Goal: Absence of Infection Signs and Symptoms  Outcome: Ongoing, Progressing     Problem: Diabetes Comorbidity  Goal: Blood Glucose Level Within Targeted Range  Outcome: Ongoing, Progressing  Intervention: Monitor and Manage Glycemia  Flowsheets (Taken 4/10/2024 0537)  Glycemic Management: blood glucose monitored

## 2024-04-10 NOTE — PLAN OF CARE
Problem: Physical Therapy  Goal: Physical Therapy Goal  Description: Goals to be met by:      Patient will increase functional independence with mobility by performin. Supine to sit with Modified [place]  2. Sit to supine with Modified [place]  3. Sit to stand transfer with Modified [place]  4. Gait  x 100 feet with Modified [place] using Quad Cane.    Recommend: Moderate Intensity Therapy at time of discharge.        Outcome: Ongoing, Not Progressing   Pt fatigued /p dialysis however, agreeable to therapy treatments.

## 2024-04-10 NOTE — PROGRESS NOTES
Miguel Moore is a 68 y.o. male patient.    Follow for ESRD, dialysis    Patient seen while on dialysis  No new c/o, comfortable    Scheduled Meds:   sodium chloride 0.9%   Intravenous Once    amiodarone  400 mg Oral BID    atorvastatin  80 mg Oral Daily    ceFEPime IV (PEDS and ADULTS)  1 g Intravenous Q24H    epoetin marisol-epbx  5,000 Units Subcutaneous Every Mon, Wed, Fri    finasteride  5 mg Oral Daily    heparin (porcine)  5,000 Units Subcutaneous Q12H    tamsulosin  2 capsule Oral Daily    vitamin renal formula (B-complex-vitamin c-folic acid)  1 capsule Oral Daily       Review of patient's allergies indicates:   Allergen Reactions    Ace inhibitors      Hyperkalemia 8/2018  Other reaction(s): Unknown    Penicillins Hives     Tolerated cefepime and cefdinir previously    Tizanidine      Felt hot         Vital Signs Range (Last 24H):  Temp:  [98.2 °F (36.8 °C)-99.8 °F (37.7 °C)]   Pulse:  [65-83]   Resp:  [13-28]   BP: (104-128)/(53-62)   SpO2:  [94 %-100 %]     I & O (Last 24H):  Intake/Output Summary (Last 24 hours) at 4/10/2024 1010  Last data filed at 4/10/2024 0942  Gross per 24 hour   Intake 420 ml   Output 0 ml   Net 420 ml           Physical Exam:  General appearance: well developed, no distress  Lungs:  clear to auscultation bilaterally and normal respiratory effort  Heart: regular rate and rhythm  Abdomen:  soft, normal bowel sounds  Extremities: edema trace    Laboratory:  I have reviewed all pertinent lab results within the past 24 hours.  CBC:   Recent Labs   Lab 04/10/24  0459   WBC 14.64*   RBC 2.85*   HGB 7.4*   HCT 23.5*      MCV 83   MCH 26.0*   MCHC 31.5*     CMP:   Recent Labs   Lab 04/10/24  0459   *   CALCIUM 7.8*   ALBUMIN 1.4*   PROT 5.4*   *   K 4.4   CO2 23   CL 99   BUN 38*   CREATININE 7.4*   ALKPHOS 87   ALT 37   AST 98*   BILITOT 0.8       Assessment & Plan    ESRD - on dialysis, stable, tolerated well  HTN  DM type 2  (R) foot  cellulitis/abscess  PAF  Anemia in CKD    Continue present Rx  HD q MWF  We'll follow for dialysis        Nils Menard  4/10/2024

## 2024-04-10 NOTE — ASSESSMENT & PLAN NOTE
Patient's anemia is currently controlled. Has not received any PRBCs to date.   Lab Results   Component Value Date    HGB 7.4 (L) 04/10/2024    HCT 23.5 (L) 04/10/2024   Anemia of CKD plus anemia of acute blood loss as expected from surgical procedures.  Transfused 2 units of blood with adequate correction.  Continue to monitor

## 2024-04-10 NOTE — PT/OT/SLP PROGRESS
Occupational Therapy   Treatment    Name: Miguel Moore  MRN: [id number]  Admitting Diagnosis:  Cellulitis of right foot  1 Day Post-Op    Recommendations:     Discharge Recommendations: Moderate Intensity Therapy  Discharge Equipment Recommendations:  to be determined by next level of care  Barriers to discharge:   (Pt is at high risk of falls, readmission and morbidity if d/c home at this time.)    Assessment:     Miguel Moore is a 68 y.o. male with a medical diagnosis of Cellulitis of right foot.. Performance deficits affecting function are weakness, impaired endurance, impaired self care skills, impaired functional mobility, gait instability, impaired balance, decreased lower extremity function, decreased safety awareness.     Rehab Prognosis:  Good; patient would benefit from acute skilled OT services to address these deficits and reach maximum level of function.       Plan:     Patient to be seen  (3-5x/wk) to address the above listed problems via self-care/home management, therapeutic activities, therapeutic exercises  Plan of Care Expires: 04/15/24  Plan of Care Reviewed with: patient    Subjective     Chief Complaint: weakness post dialysis   Patient/Family Comments/goals: agreeably to try   Pain/Comfort:  Pain Rating 1: 7/10    Objective:     Communicated with: Nurse prior to session.  Patient found HOB elevated with pressure relief boots, telemetry (AV fistula) upon OT entry to room.    General Precautions: Standard, fall    Orthopedic Precautions:N/A  Braces: N/A  Respiratory Status: High flow, flow 2 L/min     Occupational Performance:     Bed Mobility:  With prolonged sitting, pt observed w/ fatigue and decreased sitting balance, requiring mod-max A for maintaining balance.   Patient completed Scooting/Bridging with maximal assistance and 2 persons  Patient completed Supine to Sit with maximal assistance and 2 persons  Patient completed Sit to Supine with maximal assistance and 2 persons      Functional Mobility/Transfers:  Patient completed Sit <> Stand Transfer x 1 trial from EOB with maximal assistance, total assistance, and of 2 persons  with  hand-held assist     Activities of Daily Living:  Upper Body Dressing: moderate assistance for donning gown over back       [facility] 6 Click ADL: 16    Treatment & Education:  -Pt performed weight shifting activities for correcting sitting balance.   -Pt educated on safe functional mobility and ADL performance.   -Questions and concerns addressed within scope.     Patient left HOB elevated with all lines intact and call button in reach w/ BLE pressure relief boots in place    GOALS:   Multidisciplinary Problems       Occupational Therapy Goals          Problem: Occupational Therapy    Goal Priority Disciplines Outcome Interventions   Occupational Therapy Goal     OT, PT/OT Ongoing, Progressing    Description: Goals to be met by: 4/15/24     Patient will increase functional independence with ADLs by performing:    Grooming while seated at EOB w/ seated with Supervision.  Toileting from bedside commode with Min A for hygiene and clothing management.   Rolling to Bilateral with SBA.   Supine to sit with SBA.  Sit to stand with Min A.   Sitting EOB for >25 min w/ SBA for sitting balance.   Step transfer: TBD   Toilet transfer: TBD  Upper extremity exercise program x15 reps per handout, with independence.                         Time Tracking:     OT Date of Treatment: 04/10/24  OT Start Time: 1440  OT Stop Time: 1503  OT Total Time (min): 23 min    Billable Minutes:Therapeutic Activity 23  Total Time 23 (co-txw/ PT)     OT/GENEVA: OT          4/10/2024

## 2024-04-10 NOTE — PLAN OF CARE
Problem: Adult Inpatient Plan of Care  Goal: Plan of Care Review  Outcome: Ongoing, Progressing  Flowsheets (Taken 4/10/2024 0815)  Plan of Care Reviewed With: patient  Goal: Patient-Specific Goal (Individualized)  Outcome: Ongoing, Progressing     Problem: Diabetes Comorbidity  Goal: Blood Glucose Level Within Targeted Range  Outcome: Ongoing, Progressing  Intervention: Monitor and Manage Glycemia  Flowsheets (Taken 4/10/2024 0815)  Glycemic Management:   blood glucose monitored   carbohydrate replacement provided   oral hydration promoted   supplemental insulin given     Problem: Adult Inpatient Plan of Care  Goal: Plan of Care Review  Outcome: Ongoing, Progressing  Flowsheets (Taken 4/10/2024 0815)  Plan of Care Reviewed With: patient     Problem: Adult Inpatient Plan of Care  Goal: Plan of Care Review  Outcome: Ongoing, Progressing  Flowsheets (Taken 4/10/2024 0815)  Plan of Care Reviewed With: patient     Problem: Adult Inpatient Plan of Care  Goal: Patient-Specific Goal (Individualized)  Outcome: Ongoing, Progressing     Problem: Adult Inpatient Plan of Care  Goal: Patient-Specific Goal (Individualized)  Outcome: Ongoing, Progressing     Problem: Diabetes Comorbidity  Goal: Blood Glucose Level Within Targeted Range  Outcome: Ongoing, Progressing  Intervention: Monitor and Manage Glycemia  Flowsheets (Taken 4/10/2024 0815)  Glycemic Management:   blood glucose monitored   carbohydrate replacement provided   oral hydration promoted   supplemental insulin given     Problem: Diabetes Comorbidity  Goal: Blood Glucose Level Within Targeted Range  Outcome: Ongoing, Progressing     Problem: Diabetes Comorbidity  Goal: Blood Glucose Level Within Targeted Range  Intervention: Monitor and Manage Glycemia  Flowsheets (Taken 4/10/2024 0815)  Glycemic Management:   blood glucose monitored   carbohydrate replacement provided   oral hydration promoted   supplemental insulin given     Problem: Diabetes Comorbidity  Goal:  Blood Glucose Level Within Targeted Range  Intervention: Monitor and Manage Glycemia  Flowsheets (Taken 4/10/2024 0815)  Glycemic Management:   blood glucose monitored   carbohydrate replacement provided   oral hydration promoted   supplemental insulin given

## 2024-04-10 NOTE — PLAN OF CARE
TN placed 3MWound VAC from in chart for Dr. Montilla to fill out if patient is going home with a wound vac.

## 2024-04-10 NOTE — PROGRESS NOTES
[facility] Medicine  Progress Note    Patient Name: Miguel Moore  MRN: [id number]  Patient Class: IP- Inpatient   Admission Date: 3/20/2024  Length of Stay: 21 days  Attending Physician: Hipolito Mooney MD  Primary Care Provider: Raciel Raymond MD        Subjective:     Principal Problem:Cellulitis of right foot        HPI:  Mr Miguel Moore is a 68 y.o. man with ESRD on HD, HTN, DM with neuropathy, history of CVA with residual R sided weakness, chronic DVT on eliquis who presented with feeling poorly. He attended his usual dialysis session on Monday 3/18 without issues. He developed fatigue and R ankle swelling. He has some pain with palpation but is able to walk. No reports of trauma. No wounds. No falls. No history of gout. Today he was feeling worse so did not go to dialysis and came to the ED. No fevers. No shortness of breath. Normal appetite. No nausea, vomiting. No chest pain. No palpitations.     In the ED, afebrile with HR initially controlled then to 130s Afib RVR. Started diltiazem but became hypotensive. Diltiazem stopped. Given antibiotics. Admitted for further management.     Overview/Hospital Course:  Mr Miguel Moore is a 68 y.o. man with ESRD on HD, DM who was admitted with new onset Afib RVR, hyperkalemia from missed HD session, and R ankle cellulitis. Started amio gtt with conversion to NSR. TTE EF 55-60%, mild LAE. Cardiology consulted. Now on PO amiodarone and continues home eliquis (on this for chronic DVT). He received HD with resolution of hyperkalemia. He is on CTX for his R ankle cellulitis and was given colchicine in case gout could contribute. He developed recurrent Afib and amio gtt resumed. Cardiology was planning SONY/DCCV on 3/22. But converted to sinus. Transferred to floor 3/26.   Right ankle cellulitis improving however spiking fevers on 3/27 up to 102.7 F.  Vanc/Ceftriaxone changed to Meropenem. CT right ankle with no obvious acute infection, pain is  improving. Right forearm with swelling, u/s negative for abscess or hematoma. Blood cultures negative. Possible medication related fevers and ABx's stopped. 4/4-Right ankle wound then had new abscess/discharge sp I& D with wound vac placement by Orthopedic Surgery.  Anemia of CKD plus anemia of acute blood loss as expected from surgical procedure and patient transfused blood.  Repeat OR I&D and wound vac back on. Operative wound culture grew e coli. ID following for ABX recommendations.  Patient was scheduled for repeat washout, and wound VAC change today.  Patient followed by ID and ortho, input appreciated.  Patient improving postoperatively.  Possible discharge home in the next few days on Friday Orthopedics agrees.  Patient will need home health care with wound care and wound VAC to be set up.  Patient declines SNF.      Interval History:  Patient was seen today for follow-up care.  Patient reports no chest pain or shortness a breath.  Patient had repeat washout of right yesterday of the right ankle cellulitis/abscess, and wound VAC change.  Wound culture growing E coli.  Patient followed by ID and Orthopedics, input appreciated.  Recommended oral antibiotics on discharge for 10 days.  Possible discharge home in the next few days, on Friday if cleared by Orthopedics.  He will need home health care with wound care and wound VAC to be set up.   to assist with discharge planning.        Review of Systems   Constitutional: Negative.    HENT: Negative.     Eyes: Negative.    Respiratory: Negative.     Cardiovascular: Negative.    Gastrointestinal: Negative.    Endocrine: Negative.    Genitourinary: Negative.    Musculoskeletal: Negative.    Skin: Negative.    Allergic/Immunologic: Negative.    Neurological: Negative.    Hematological: Negative.    Psychiatric/Behavioral: Negative.       Objective:     Vital Signs (Most Recent):  Temp: 98.5 °F (36.9 °C) (04/10/24 0720)  Pulse: 77 (04/10/24 0720)  Resp: 18  (04/10/24 0720)  BP: 128/62 (04/10/24 0720)  SpO2: (!) 94 % (04/10/24 0720) Vital Signs (24h Range):  Temp:  [98.2 °F (36.8 °C)-99.8 °F (37.7 °C)] 98.5 °F (36.9 °C)  Pulse:  [65-83] 77  Resp:  [13-28] 18  SpO2:  [94 %-100 %] 94 %  BP: (104-128)/(53-62) 128/62     Weight: 90.4 kg (199 lb 4.7 oz)  Body mass index is 30.3 kg/m².    Intake/Output Summary (Last 24 hours) at 4/10/2024 1033  Last data filed at 4/10/2024 0942  Gross per 24 hour   Intake 420 ml   Output 0 ml   Net 420 ml           Physical Exam  Vitals reviewed.   Constitutional:       Appearance: Normal appearance. He is normal weight.   HENT:      Head: Normocephalic.      Right Ear: External ear normal.      Left Ear: External ear normal.      Nose: Nose normal.      Mouth/Throat:      Mouth: Mucous membranes are moist.      Pharynx: Oropharynx is clear.   Eyes:      Extraocular Movements: Extraocular movements intact.      Conjunctiva/sclera: Conjunctivae normal.      Pupils: Pupils are equal, round, and reactive to light.   Cardiovascular:      Rate and Rhythm: Normal rate and regular rhythm.      Pulses: Normal pulses.      Heart sounds: Normal heart sounds.   Pulmonary:      Effort: Pulmonary effort is normal.      Breath sounds: Normal breath sounds.   Abdominal:      General: Abdomen is flat. Bowel sounds are normal.      Palpations: Abdomen is soft.   Musculoskeletal:         General: Normal range of motion.      Cervical back: Normal range of motion and neck supple.      Comments: S/P I&D of right ankle, with wound VAC in place.   Skin:     General: Skin is warm.      Capillary Refill: Capillary refill takes less than 2 seconds.   Neurological:      General: No focal deficit present.      Mental Status: He is alert and oriented to person, place, and time. Mental status is at baseline.      Cranial Nerves: No cranial nerve deficit.   Psychiatric:         Mood and Affect: Mood normal.         Behavior: Behavior normal.         Thought Content:  "Thought content normal.         Judgment: Judgment normal.             Significant Labs: All pertinent labs within the past 24 hours have been reviewed.  A1C:   Recent Labs   Lab 10/26/23  1439 03/20/24  1724   HGBA1C 5.9* 5.8*       Blood Culture: No results for input(s): "LABBLOO" in the last 48 hours.  CBC:   Recent Labs   Lab 04/09/24  0443 04/10/24  0459   WBC 12.71* 14.64*   HGB 7.8* 7.4*   HCT 23.8* 23.5*    320       CMP:   Recent Labs   Lab 04/09/24  0443 04/10/24  0459    135*   K 3.8 4.4    99   CO2 27 23   * 162*   BUN 29* 38*   CREATININE 5.9* 7.4*   CALCIUM 7.5* 7.8*   PROT 4.9* 5.4*   ALBUMIN 1.3* 1.4*   BILITOT 0.6 0.8   ALKPHOS 94 87   AST 72* 98*   ALT 24 37   ANIONGAP 8 13       Urine Culture: No results for input(s): "LABURIN" in the last 48 hours.    Significant Imaging: I have reviewed all pertinent imaging results/findings within the past 24 hours.    Assessment/Plan:      * Cellulitis/Abscess  of right foot/ankle  R ankle pain, swelling, warmth present. Potential gout vs cellulitis. 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 No fracture present. Uric acid normal. Arterial US and venous US with no clots, appropriate flow  - on ceftriaxone/vanc for 7 days and still with pain and now with increased WBC and fevers  - blood cultures currently NGTD  - CT right ankle with no evidence of osteo.  -4/4-Right ankle wound then had new abscess/discharge sp I& D with wound vac placement  -4/6-Repeat OR I&D and wound vac back on. OP wound culture grew e coli. Leukocytosis stable, afebrile.   Per ID ok to transition to po cephalosporin (cefadroxil, 1 gram q72H) to complete 10 days of abx treatment on discharge.      Open wound  -continue wound care and wound VAC management.  -patient on IV antibiotics cefepime 1 g Q 24 hours  -wound culture grew E coli.  -patient was followed by ID and Orthopedics, input appreciated.  -when discharge with transition to " p.o. antibiotics cefadroxil 1 g q.72 hours to complete a total of 10 days of treatment.      Paroxysmal atrial fibrillation with RVR  Patient has Paroxysmal (<7 days) atrial fibrillation which is uncontrolled. Patient is currently in atrial fibrillation.  - new onset Afib  - on labetalol for BP at home, eliquis for chronic DVT but did miss some doses of eliquis  - in ED given diltiazem gtt but that caused hypotension  - EKG with Afib RVR with normal Qtc  - started amiodarone with conversion to NSR. Changed to PO amiodarone  - however, he had recurrent Afib and was placed back on amio gtt.   - Cardiology consulted  - plan for SONY/DCCV on 3/22/24 but converted to sinus rhythm. Has been in/out of afib but rate controlled  - continue home eliquis  Hold Eliquis for I&D and possible future procedures.  Restart once done with procedures.    Anemia in chronic kidney disease  Patient's anemia is currently controlled. Has not received any PRBCs to date.   Lab Results   Component Value Date    HGB 7.4 (L) 04/10/2024    HCT 23.5 (L) 04/10/2024   Anemia of CKD plus anemia of acute blood loss as expected from surgical procedures.  Transfused 2 units of blood with adequate correction.  Continue to monitor    Chronic deep vein thrombosis (DVT) of popliteal vein of right lower extremity 9/21/20 outside US; unprovoked; anticoagulation  Continue holding as patient may need repeat procedure.  Heparin for DVT prophylaxis.  -restart apixaban once procedures done.      History of stroke  PT/OT evaluation completed and recommended moderate intensity therapy  Case management will assist    Secondary hyperparathyroidism of renal origin  Continue renvela       ESRD (end stage renal disease)  ESRD via LUE AVF. Last session 3/18/24. Missed 3/20/24 due to fatigue. Usually MWF  - hyperkalemic on admit. Does not appear volume overloaded.   - Nephrology consulted.  - continue MWF HD    Hemiplegia and hemiparesis following cerebral infarction  affecting right dominant side  No signs of acute stroke. Does have RUE weakness.   - continue home asa, statin  - resume apixaban once done with procedures.      Seizure disorder as sequela of cerebrovascular accident  Not currently on antiepileptics. No seizure activity reported. Monitor.       Controlled type 2 diabetes mellitus with chronic kidney disease on chronic dialysis, with long-term current use of insulin  A1c:   Lab Results   Component Value Date    HGBA1C 5.8 (H) 03/20/2024     Meds: SSI PRN to maintain goal 140-180  ADA renal diet, accuchecks, hypoglycemic protocol      Dyslipidemia  Continue statin      Benign essential HTN  Chronic. Latest blood pressure and vitals reviewed-     Temp:  [98.2 °F (36.8 °C)-99.8 °F (37.7 °C)]   Pulse:  [65-83]   Resp:  [13-28]   BP: (104-128)/(53-62)   SpO2:  [94 %-100 %] .   Home meds for hypertension were reviewed and noted below.   Hypertension Medications               amLODIPine (NORVASC) 10 MG tablet Take 1 tablet by mouth once daily    labetaloL (NORMODYNE) 100 MG tablet Take 1 tablet (100 mg total) by mouth once daily.          Continue current management.    Will utilize p.r.n. blood pressure medication only if patient's blood pressure greater than 180/110 and he develops symptoms such as worsening chest pain or shortness of breath.      VTE Risk Mitigation (From admission, onward)           Ordered     heparin (porcine) injection 5,000 Units  Every 12 hours         04/04/24 1602                    Discharge Planning   GARY:      Code Status: Full Code   Is the patient medically ready for discharge?:     Reason for patient still in hospital (select all that apply): Patient trending condition, Treatment, Consult recommendations, PT / OT recommendations, and Pending disposition  Discharge Plan A: Home Health, Home with family   Discharge Delays: (!) Procedure Scheduling (IR, OR, Labs, Echo, Cath, Echo, EEG)              Hipolito Mooney MD  Department of Hospital  [facility] Surg

## 2024-04-10 NOTE — NURSING
[facility], [facility]  Nurses Note -- 4 Eyes      4/10/2024       Skin assessed on: Q Shift      [] No Pressure Injuries Present    []Prevention Measures Documented    [x] Yes LDA  for Pressure Injury Previously documented     [] Yes New Pressure Injury Discovered   [] LDA for New Pressure Injury Added      Attending RN:  Cherry Payne RN     Second RN:  Aman

## 2024-04-10 NOTE — PROGRESS NOTES
The patient is in bed.  Reports no complaints.     Temp:  [98.2 °F (36.8 °C)-99.8 °F (37.7 °C)] 98.5 °F (36.9 °C)  Pulse:  [65-83] 77  Resp:  [13-28] 18  SpO2:  [94 %-100 %] 94 %  BP: (104-128)/(53-62) 128/62    Physical examination:    Right foot and ankle dressing is clean and dry.  Wound VAC is functioning appropriately.      Recent Labs   Lab 04/10/24  0459   WBC 14.64*   RBC 2.85*   HGB 7.4*   HCT 23.5*      MCV 83   MCH 26.0*   MCHC 31.5*         Current Facility-Administered Medications:     0.9%  NaCl infusion (for blood administration), , Intravenous, Q24H PRN, Jimbo Montilla III, MD    0.9%  NaCl infusion, , Intravenous, Once, Jimbo Montilla III, MD    acetaminophen tablet 650 mg, 650 mg, Oral, Q6H PRN, Jimbo Montilla III, MD    amiodarone tablet 400 mg, 400 mg, Oral, BID, Jimbo Montilla III, MD, 400 mg at 04/10/24 0814    atorvastatin tablet 80 mg, 80 mg, Oral, Daily, Jimbo Montilla III, MD, 80 mg at 04/10/24 0814    ceFEPIme (MAXIPIME) 1 g in dextrose 5 % in water (D5W) 100 mL IVPB (MB+), 1 g, Intravenous, Q24H, Jimbo Montilla III, MD, Stopped at 04/09/24 2127    dextrose 10% bolus 125 mL 125 mL, 12.5 g, Intravenous, PRN, Jimbo Montilla III, MD    dextrose 10% bolus 250 mL 250 mL, 25 g, Intravenous, PRN, Jimbo Montilla III, MD    diphenoxylate-atropine 2.5-0.025 mg per tablet 1 tablet, 1 tablet, Oral, Q6H PRN, Jimbo Montilla III, MD, 1 tablet at 04/02/24 2334    epoetin marisol-epbx injection 5,000 Units, 5,000 Units, Subcutaneous, Every Mon, Wed, Fri, Jimbo Montilla III, MD, 5,000 Units at 04/10/24 0814    finasteride tablet 5 mg, 5 mg, Oral, Daily, Jimbo Montilla III, MD, 5 mg at 04/10/24 0814    glucagon (human recombinant) injection 1 mg, 1 mg, Intramuscular, PRN, Jimbo Montilla III, MD    glucose chewable tablet 16 g, 16 g, Oral, PRN, Jimbo Montilla III, MD    glucose chewable tablet 24 g, 24 g, Oral, PRN, Jimbo Montilla III, MD    heparin (porcine)  injection 5,000 Units, 5,000 Units, Subcutaneous, Q12H, Jimbo Montilla III, MD, 5,000 Units at 04/10/24 0814    HYDROmorphone injection 0.5 mg, 0.5 mg, Intravenous, Q4H PRN, Jimbo Montilla III, MD, 0.5 mg at 04/09/24 1927    insulin aspart U-100 pen 0-5 Units, 0-5 Units, Subcutaneous, QID (AC + HS) PRN, Jimbo Montilla III, MD, 1 Units at 04/06/24 2216    melatonin tablet 6 mg, 6 mg, Oral, Nightly PRN, Jimbo Montilla III, MD, 6 mg at 04/08/24 2046    naloxone 0.4 mg/mL injection 0.02 mg, 0.02 mg, Intravenous, PRN, Jimbo Montilla III, MD    ondansetron injection 4 mg, 4 mg, Intravenous, Q6H PRN, Jimbo Montilla III, MD, 4 mg at 04/10/24 0710    oxyCODONE-acetaminophen  mg per tablet 1 tablet, 1 tablet, Oral, Q4H PRN, Jimbo Montilla III, MD, 1 tablet at 04/10/24 0710    prochlorperazine injection Soln 5 mg, 5 mg, Intravenous, Q6H PRN, Jimbo Montilla III, MD    sodium chloride 0.9% bolus 250 mL 250 mL, 250 mL, Intravenous, PRN, Jimbo Montilla III, MD    sodium chloride 0.9% bolus 250 mL 250 mL, 250 mL, Intravenous, PRN, Jimbo Montilla III, MD    tamsulosin 24 hr capsule 0.8 mg, 2 capsule, Oral, Daily, Jimbo Montilla III, MD, 0.8 mg at 04/10/24 0814    vitamin renal formula (B-complex-vitamin c-folic acid) 1 mg per capsule 1 capsule, 1 capsule, Oral, Daily, Jimbo Montilla III, MD, 1 capsule at 04/10/24 0814    Assessment and Plan:    68-year-old male with history of end-stage renal disease on dialysis presents with right leg infection. He underwent I&D of his right leg abscess on 04/04 and repeat I and D and replacement of wound VAC on 4/6 and 4/9.     Leukocytosis remains.    No plans for further surgical washout unless condition/wound worsens.  We will plan for dressing change with wound care with wound VAC Thursday or Friday.  Anticipate he will need wound care and likely VAC for discharge.    May need additional surgery for grafting of wounds in the future but too early to  tell at this point.     Jimbo Montilla MD  [facility]  [phone]  This note has been transcribed with voice recognition software, but not reviewed and may contain unrecognized errors.

## 2024-04-11 LAB
ALBUMIN SERPL BCP-MCNC: 1.3 G/DL (ref 3.5–5.2)
ALP SERPL-CCNC: 86 U/L (ref 55–135)
ALT SERPL W/O P-5'-P-CCNC: 51 U/L (ref 10–44)
ANION GAP SERPL CALC-SCNC: 15 MMOL/L (ref 8–16)
AST SERPL-CCNC: 132 U/L (ref 10–40)
BASOPHILS # BLD AUTO: 0.08 K/UL (ref 0–0.2)
BASOPHILS NFR BLD: 0.5 % (ref 0–1.9)
BILIRUB SERPL-MCNC: 0.7 MG/DL (ref 0.1–1)
BUN SERPL-MCNC: 25 MG/DL (ref 8–23)
CALCIUM SERPL-MCNC: 7.4 MG/DL (ref 8.7–10.5)
CHLORIDE SERPL-SCNC: 102 MMOL/L (ref 95–110)
CO2 SERPL-SCNC: 21 MMOL/L (ref 23–29)
CREAT SERPL-MCNC: 5.1 MG/DL (ref 0.5–1.4)
DIFFERENTIAL METHOD BLD: ABNORMAL
EOSINOPHIL # BLD AUTO: 0.1 K/UL (ref 0–0.5)
EOSINOPHIL NFR BLD: 0.8 % (ref 0–8)
ERYTHROCYTE [DISTWIDTH] IN BLOOD BY AUTOMATED COUNT: 19.5 % (ref 11.5–14.5)
EST. GFR  (NO RACE VARIABLE): 12 ML/MIN/1.73 M^2
GLUCOSE SERPL-MCNC: 146 MG/DL (ref 70–110)
HCT VFR BLD AUTO: 22.1 % (ref 40–54)
HGB BLD-MCNC: 7.1 G/DL (ref 14–18)
IMM GRANULOCYTES # BLD AUTO: 0.24 K/UL (ref 0–0.04)
IMM GRANULOCYTES NFR BLD AUTO: 1.6 % (ref 0–0.5)
LYMPHOCYTES # BLD AUTO: 0.9 K/UL (ref 1–4.8)
LYMPHOCYTES NFR BLD: 5.7 % (ref 18–48)
MCH RBC QN AUTO: 26.1 PG (ref 27–31)
MCHC RBC AUTO-ENTMCNC: 32.1 G/DL (ref 32–36)
MCV RBC AUTO: 81 FL (ref 82–98)
MONOCYTES # BLD AUTO: 1.1 K/UL (ref 0.3–1)
MONOCYTES NFR BLD: 7.2 % (ref 4–15)
NEUTROPHILS # BLD AUTO: 12.7 K/UL (ref 1.8–7.7)
NEUTROPHILS NFR BLD: 84.2 % (ref 38–73)
NRBC BLD-RTO: 0 /100 WBC
PLATELET # BLD AUTO: 345 K/UL (ref 150–450)
PMV BLD AUTO: 9.7 FL (ref 9.2–12.9)
POCT GLUCOSE: 190 MG/DL (ref 70–110)
POCT GLUCOSE: 190 MG/DL (ref 70–110)
POCT GLUCOSE: 198 MG/DL (ref 70–110)
POCT GLUCOSE: 213 MG/DL (ref 70–110)
POTASSIUM SERPL-SCNC: 4.3 MMOL/L (ref 3.5–5.1)
PROT SERPL-MCNC: 4.6 G/DL (ref 6–8.4)
RBC # BLD AUTO: 2.72 M/UL (ref 4.6–6.2)
SODIUM SERPL-SCNC: 138 MMOL/L (ref 136–145)
WBC # BLD AUTO: 15.07 K/UL (ref 3.9–12.7)

## 2024-04-11 PROCEDURE — 97530 THERAPEUTIC ACTIVITIES: CPT | Mod: HCNC,CQ

## 2024-04-11 PROCEDURE — 25000003 PHARM REV CODE 250: Mod: HCNC | Performed by: ORTHOPAEDIC SURGERY

## 2024-04-11 PROCEDURE — 36415 COLL VENOUS BLD VENIPUNCTURE: CPT | Mod: HCNC | Performed by: ORTHOPAEDIC SURGERY

## 2024-04-11 PROCEDURE — 63600175 PHARM REV CODE 636 W HCPCS: Mod: HCNC | Performed by: ORTHOPAEDIC SURGERY

## 2024-04-11 PROCEDURE — 80053 COMPREHEN METABOLIC PANEL: CPT | Mod: HCNC | Performed by: ORTHOPAEDIC SURGERY

## 2024-04-11 PROCEDURE — 97110 THERAPEUTIC EXERCISES: CPT | Mod: HCNC,CQ

## 2024-04-11 PROCEDURE — 21400001 HC TELEMETRY ROOM: Mod: HCNC

## 2024-04-11 PROCEDURE — 85025 COMPLETE CBC W/AUTO DIFF WBC: CPT | Mod: HCNC | Performed by: ORTHOPAEDIC SURGERY

## 2024-04-11 RX ADMIN — HEPARIN SODIUM 5000 UNITS: 5000 INJECTION INTRAVENOUS; SUBCUTANEOUS at 09:04

## 2024-04-11 RX ADMIN — FINASTERIDE 5 MG: 5 TABLET, FILM COATED ORAL at 09:04

## 2024-04-11 RX ADMIN — INSULIN ASPART 2 UNITS: 100 INJECTION, SOLUTION INTRAVENOUS; SUBCUTANEOUS at 12:04

## 2024-04-11 RX ADMIN — OXYCODONE AND ACETAMINOPHEN 1 TABLET: 10; 325 TABLET ORAL at 07:04

## 2024-04-11 RX ADMIN — ATORVASTATIN CALCIUM 80 MG: 40 TABLET, FILM COATED ORAL at 09:04

## 2024-04-11 RX ADMIN — OXYCODONE AND ACETAMINOPHEN 1 TABLET: 10; 325 TABLET ORAL at 11:04

## 2024-04-11 RX ADMIN — HYDROMORPHONE HYDROCHLORIDE 0.5 MG: 1 INJECTION, SOLUTION INTRAMUSCULAR; INTRAVENOUS; SUBCUTANEOUS at 10:04

## 2024-04-11 RX ADMIN — AMIODARONE HYDROCHLORIDE 400 MG: 200 TABLET ORAL at 10:04

## 2024-04-11 RX ADMIN — TAMSULOSIN HYDROCHLORIDE 0.8 MG: 0.4 CAPSULE ORAL at 09:04

## 2024-04-11 RX ADMIN — HEPARIN SODIUM 5000 UNITS: 5000 INJECTION INTRAVENOUS; SUBCUTANEOUS at 10:04

## 2024-04-11 RX ADMIN — HYDROMORPHONE HYDROCHLORIDE 0.5 MG: 1 INJECTION, SOLUTION INTRAMUSCULAR; INTRAVENOUS; SUBCUTANEOUS at 12:04

## 2024-04-11 RX ADMIN — AMIODARONE HYDROCHLORIDE 400 MG: 200 TABLET ORAL at 09:04

## 2024-04-11 RX ADMIN — CEFEPIME 1 G: 1 INJECTION, POWDER, FOR SOLUTION INTRAMUSCULAR; INTRAVENOUS at 10:04

## 2024-04-11 RX ADMIN — NEPHROCAP 1 CAPSULE: 1 CAP ORAL at 09:04

## 2024-04-11 NOTE — PLAN OF CARE
TN left message with med surg nurse Chayo if Dr. Montilla comes to floor to have wound vac form in chart filled out if patient will discharge home with a wound. Form is in chart on MS unit.  \  TN called Dr. Montilla's office left message with Saranya [phone] requesting to know is patient will discharge home with a wound vac if so to please fill out form in patient's chart. TN provided phone info.

## 2024-04-11 NOTE — CONSULTS
[facility] Surg  Wound Care    Patient Name:  Miguel Moore   MRN:  [id number]  Date: 4/11/2024  Diagnosis: Cellulitis of right foot    History:     Past Medical History:   Diagnosis Date    Allergy     Clotting disorder     Elaquis and APlavix    Deep vein thrombosis     Diabetes mellitus, type 2     Hypertension     Nuclear sclerosis of both eyes 6/9/2017    Renal disorder     Seizures     Stroke     Urinary tract infection        Social History     Socioeconomic History    Marital status: Single   Occupational History    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, etc   Tobacco Use    Smoking status: Never    Smokeless tobacco: Never   Substance and Sexual Activity    Alcohol use: No    Drug use: No   Social History Narrative    Lives with daughter       Precautions:     Allergies as of 03/20/2024 - Reviewed 03/20/2024   Allergen Reaction Noted    Ace inhibitors  09/03/2018    Penicillins Hives 01/13/2015    Tizanidine  06/30/2020       WOC Assessment Details/Treatment       Right upper thigh- Shallow open ulcer with minimal drainage.          Right buttock: open ulceration with pink tissue.     Treatment/Plan:  Cleanse with Vashe. Apply hydrocolloid. Change every 3 days.     Recommendations made to primary team. Orders placed.     04/11/2024

## 2024-04-11 NOTE — SUBJECTIVE & OBJECTIVE
Interval History:  Patient was seen today for follow-up care.  Patient reports no chest pain or shortness a breath.  Patient had repeat washout of right ankle on 04/09/2024. of the right ankle cellulitis/abscess, and wound VAC change.  Wound culture growing E coli.  Patient followed by ID and Orthopedics, input appreciated.  Recommended oral antibiotics on discharge for 10 days.  Possible discharge home tomorrow if cleared by Orthopedics.  He will need home health care with wound care and wound VAC to be set up.   to assist with discharge planning.        Review of Systems   Constitutional: Negative.    HENT: Negative.     Eyes: Negative.    Respiratory: Negative.     Cardiovascular: Negative.    Gastrointestinal: Negative.    Endocrine: Negative.    Genitourinary: Negative.    Musculoskeletal: Negative.    Skin: Negative.    Allergic/Immunologic: Negative.    Neurological: Negative.    Hematological: Negative.    Psychiatric/Behavioral: Negative.       Objective:     Vital Signs (Most Recent):  Temp: 98.2 °F (36.8 °C) (04/11/24 0742)  Pulse: 74 (04/11/24 0742)  Resp: 20 (04/11/24 0742)  BP: (!) 120/58 (04/11/24 0742)  SpO2: 97 % (04/11/24 0742) Vital Signs (24h Range):  Temp:  [98.2 °F (36.8 °C)-99.9 °F (37.7 °C)] 98.2 °F (36.8 °C)  Pulse:  [70-94] 74  Resp:  [18-20] 20  SpO2:  [86 %-98 %] 97 %  BP: ()/(46-59) 120/58     Weight: 90.4 kg (199 lb 4.7 oz)  Body mass index is 30.3 kg/m².    Intake/Output Summary (Last 24 hours) at 4/11/2024 0857  Last data filed at 4/11/2024 0453  Gross per 24 hour   Intake 860 ml   Output 1975 ml   Net -1115 ml           Physical Exam  Vitals reviewed.   Constitutional:       Appearance: Normal appearance. He is normal weight.   HENT:      Head: Normocephalic.      Right Ear: External ear normal.      Left Ear: External ear normal.      Nose: Nose normal.      Mouth/Throat:      Mouth: Mucous membranes are moist.      Pharynx: Oropharynx is clear.   Eyes:       "Extraocular Movements: Extraocular movements intact.      Conjunctiva/sclera: Conjunctivae normal.      Pupils: Pupils are equal, round, and reactive to light.   Cardiovascular:      Rate and Rhythm: Normal rate and regular rhythm.      Pulses: Normal pulses.      Heart sounds: Normal heart sounds.   Pulmonary:      Effort: Pulmonary effort is normal.      Breath sounds: Normal breath sounds.   Abdominal:      General: Abdomen is flat. Bowel sounds are normal.      Palpations: Abdomen is soft.   Musculoskeletal:         General: Normal range of motion.      Cervical back: Normal range of motion and neck supple.      Comments: S/P I&D of right ankle, with wound VAC in place.   Skin:     General: Skin is warm.      Capillary Refill: Capillary refill takes less than 2 seconds.   Neurological:      General: No focal deficit present.      Mental Status: He is alert and oriented to person, place, and time. Mental status is at baseline.      Cranial Nerves: No cranial nerve deficit.   Psychiatric:         Mood and Affect: Mood normal.         Behavior: Behavior normal.         Thought Content: Thought content normal.         Judgment: Judgment normal.             Significant Labs: All pertinent labs within the past 24 hours have been reviewed.  A1C:   Recent Labs   Lab 10/26/23  1439 03/20/24  1724   HGBA1C 5.9* 5.8*       Blood Culture: No results for input(s): "LABBLOO" in the last 48 hours.  CBC:   Recent Labs   Lab 04/10/24  0459 04/11/24  0507   WBC 14.64* 15.07*   HGB 7.4* 7.1*   HCT 23.5* 22.1*    345       CMP:   Recent Labs   Lab 04/10/24  0459 04/11/24  0507   * 138   K 4.4 4.3   CL 99 102   CO2 23 21*   * 146*   BUN 38* 25*   CREATININE 7.4* 5.1*   CALCIUM 7.8* 7.4*   PROT 5.4* 4.6*   ALBUMIN 1.4* 1.3*   BILITOT 0.8 0.7   ALKPHOS 87 86   AST 98* 132*   ALT 37 51*   ANIONGAP 13 15       Urine Culture: No results for input(s): "LABURIN" in the last 48 hours.    Significant Imaging: I have " reviewed all pertinent imaging results/findings within the past 24 hours.

## 2024-04-11 NOTE — NURSING
[facility] [place]  Nurses Note -- 4 Eyes      4/10/2024       Skin assessed on: Q Shift      [] No Pressure Injuries Present    [x]Prevention Measures Documented    [x] Yes LDA  for Pressure Injury Previously documented     [] Yes New Pressure Injury Discovered   [] LDA for New Pressure Injury Added      Attending RN:  Aman Feliz RN     Second RN:  DARRION Carey

## 2024-04-11 NOTE — PT/OT/SLP PROGRESS
Physical Therapy Treatment    Patient Name:  Miguel Moore   MRN:  [id number]    Recommendations:     Discharge Recommendations: Moderate Intensity Therapy  Discharge Equipment Recommendations: lift device, hospital bed  Barriers to discharge: None  Assessment:     Miguel Moore is a 68 y.o. male admitted with a medical diagnosis of Cellulitis of right foot.  He presents with the following impairments/functional limitations: weakness, impaired endurance, impaired self care skills, impaired functional mobility, gait instability, impaired cognition, impaired balance, decreased coordination, decreased upper extremity function, decreased lower extremity function, pain, decreased safety awareness, decreased ROM, impaired coordination, impaired fine motor, impaired cardiopulmonary response to activity, edema, orthopedic precautions, impaired skin .    Rehab Prognosis: Fair-  patient would benefit from acute skilled PT services to address these deficits and reach maximum level of function.    Recent Surgery: Procedure(s) (LRB):  INCISION AND DRAINAGE, LOWER EXTREMITY- FOOT & ANKLE (Right)  WOUND VAC EXCHANGE (Right) 2 Days Post-Op    Plan:     During this hospitalization, patient to be seen 3 x/week to address the identified rehab impairments via gait training, therapeutic activities, therapeutic exercises and progress toward the following goals:    Plan of Care Expires:  04/17/24    Subjective     Chief Complaint: tired and nausea (nurse notified )   Patient/Family Comments/goals: pt is agreeable to therapy with encouragement and education   Pain/Comfort:  Pain Rating 1: 0/10  Pain Rating Post-Intervention 1: 0/10      Objective:     Communicated with nurse prior to session.  Patient found right sidelying with telemetry, bed alarm, peripheral IV, pressure relief boots, wound vac upon PT entry to room.     General Precautions: Standard, fall, diabetic, respiratory  Orthopedic Precautions: N/A  Braces: N/A  Respiratory  Status: Nasal cannula, flow 2 L/min   SpO2 : 99% on 2L   Functional Mobility:  Bed Mobility:     Rolling Left:  total assistance and of 2 persons  Rolling Right: total assistance and of 2 persons  Scooting: dependence and of 2 persons      AM-PAC 6 CLICK MOBILITY  Turning over in bed (including adjusting bedclothes, sheets and blankets)?: 2  Sitting down on and standing up from a chair with arms (e.g., wheelchair, bedside commode, etc.): 1  Moving from lying on back to sitting on the side of the bed?: 2  Moving to and from a bed to a chair (including a wheelchair)?: 1  Need to walk in hospital room?: 1  Climbing 3-5 steps with a railing?: 1  Basic Mobility Total Score: 8       Treatment & Education:  Instructed pt to perform  BLE 10 reps x 2  (PROM RLE - AROM LLE ):  AP, SAQ, HS,  Hip abd/add , SLR, hip IR/ER .  V/T's cues for technique and sequence.     Patient left left sidelying with  foam wedge, HOB elevated, BUE elevated on pillows, pressure relief boots  to BLE  all lines intact, call button in reach, bed alarm on, and nurse  notified..    GOALS:   Multidisciplinary Problems       Physical Therapy Goals          Problem: Physical Therapy    Goal Priority Disciplines Outcome Goal Variances Interventions   Physical Therapy Goal     PT, PT/OT Ongoing, Not Progressing     Description: Goals to be met by:      Patient will increase functional independence with mobility by performin. Supine to sit with Modified [place]  2. Sit to supine with Modified [place]  3. Sit to stand transfer with Modified [place]  4. Gait  x 100 feet with Modified [place] using Quad Cane.    Recommend: Moderate Intensity Therapy at time of discharge.                             Time Tracking:     PT Received On: 24  PT Start Time: 1607     PT Stop Time: 1630  PT Total Time (min): 23 min     Billable Minutes: Therapeutic Activity 8 and Therapeutic Exercise 15    Treatment Type: Treatment  PT/PTA: PTA      Number of PTA visits since last PT visit: 1     04/11/2024

## 2024-04-11 NOTE — PLAN OF CARE
Problem: Adult Inpatient Plan of Care  Goal: Plan of Care Review  Outcome: Ongoing, Progressing  Flowsheets (Taken 4/11/2024 0454)  Plan of Care Reviewed With: patient  Goal: Optimal Comfort and Wellbeing  Outcome: Ongoing, Progressing  Intervention: Monitor Pain and Promote Comfort  Flowsheets (Taken 4/11/2024 0454)  Pain Management Interventions:   pillow support provided   quiet environment facilitated   position adjusted   medication offered     Problem: Diabetes Comorbidity  Goal: Blood Glucose Level Within Targeted Range  Outcome: Ongoing, Progressing  Intervention: Monitor and Manage Glycemia  Flowsheets (Taken 4/11/2024 0454)  Glycemic Management: blood glucose monitored

## 2024-04-11 NOTE — PROGRESS NOTES
[facility] Medicine  Progress Note    Patient Name: Miguel Moore  MRN: [id number]  Patient Class: IP- Inpatient   Admission Date: 3/20/2024  Length of Stay: 22 days  Attending Physician: Hipolito Mooney MD  Primary Care Provider: Raciel Raymond MD        Subjective:     Principal Problem:Cellulitis of right foot        HPI:  Mr Miguel Moore is a 68 y.o. man with ESRD on HD, HTN, DM with neuropathy, history of CVA with residual R sided weakness, chronic DVT on eliquis who presented with feeling poorly. He attended his usual dialysis session on Monday 3/18 without issues. He developed fatigue and R ankle swelling. He has some pain with palpation but is able to walk. No reports of trauma. No wounds. No falls. No history of gout. Today he was feeling worse so did not go to dialysis and came to the ED. No fevers. No shortness of breath. Normal appetite. No nausea, vomiting. No chest pain. No palpitations.     In the ED, afebrile with HR initially controlled then to 130s Afib RVR. Started diltiazem but became hypotensive. Diltiazem stopped. Given antibiotics. Admitted for further management.     Overview/Hospital Course:  Mr Miguel Moore is a 68 y.o. man with ESRD on HD, DM who was admitted with new onset Afib RVR, hyperkalemia from missed HD session, and R ankle cellulitis. Started amio gtt with conversion to NSR. TTE EF 55-60%, mild LAE. Cardiology consulted. Now on PO amiodarone and continues home eliquis (on this for chronic DVT). He received HD with resolution of hyperkalemia. He is on CTX for his R ankle cellulitis and was given colchicine in case gout could contribute. He developed recurrent Afib and amio gtt resumed. Cardiology was planning SONY/DCCV on 3/22. But converted to sinus. Transferred to floor 3/26.   Right ankle cellulitis improving however spiking fevers on 3/27 up to 102.7 F.  Vanc/Ceftriaxone changed to Meropenem. CT right ankle with no obvious acute infection, pain is  improving. Right forearm with swelling, u/s negative for abscess or hematoma. Blood cultures negative. Possible medication related fevers and ABx's stopped. 4/4-Right ankle wound then had new abscess/discharge sp I& D with wound vac placement by Orthopedic Surgery.  Anemia of CKD plus anemia of acute blood loss as expected from surgical procedure and patient transfused blood.  Repeat OR I&D and wound vac back on. Operative wound culture grew e coli. ID following for ABX recommendations.  Patient was scheduled for repeat washout, and wound VAC change today.  Patient followed by ID and ortho, input appreciated.  Patient improving postoperatively.  Possible discharge home in the next few days on Friday Orthopedics agrees.  Patient will need home health care with wound care and wound VAC to be set up.  Patient declines SNF.      Interval History:  Patient was seen today for follow-up care.  Patient reports no chest pain or shortness a breath.  Patient had repeat washout of right ankle on 04/09/2024. of the right ankle cellulitis/abscess, and wound VAC change.  Wound culture growing E coli.  Patient followed by ID and Orthopedics, input appreciated.  Recommended oral antibiotics on discharge for 10 days.  Possible discharge home tomorrow if cleared by Orthopedics.  He will need home health care with wound care and wound VAC to be set up.   to assist with discharge planning.        The patient will benefit from a hospital bed because  requires a hospital bed due to her requiring positioning of the body in ways not feasible with an ordinary bed to alleviate pain/ is completely immobile /or limited mobility and cannot independently make changes in body position without the use of the bed. The positioning of the body cannot be sufficiently resolved by the use of pillows and wedges. He also requires the head of the bed to be elevated more than 30 degrees most of the time due to CHF, Chromic pulmonary disease, or  problems with aspiration. The positioning of the body cannot be sufficiently resolved by the use of pillows and wedges.  (Most respiratory issues would qualify the patient)         Review of Systems   Constitutional: Negative.    HENT: Negative.     Eyes: Negative.    Respiratory: Negative.     Cardiovascular: Negative.    Gastrointestinal: Negative.    Endocrine: Negative.    Genitourinary: Negative.    Musculoskeletal: Negative.    Skin: Negative.    Allergic/Immunologic: Negative.    Neurological: Negative.    Hematological: Negative.    Psychiatric/Behavioral: Negative.       Objective:     Vital Signs (Most Recent):  Temp: 98.2 °F (36.8 °C) (04/11/24 0742)  Pulse: 74 (04/11/24 0742)  Resp: 20 (04/11/24 0742)  BP: (!) 120/58 (04/11/24 0742)  SpO2: 97 % (04/11/24 0742) Vital Signs (24h Range):  Temp:  [98.2 °F (36.8 °C)-99.9 °F (37.7 °C)] 98.2 °F (36.8 °C)  Pulse:  [70-94] 74  Resp:  [18-20] 20  SpO2:  [86 %-98 %] 97 %  BP: ()/(46-59) 120/58     Weight: 90.4 kg (199 lb 4.7 oz)  Body mass index is 30.3 kg/m².    Intake/Output Summary (Last 24 hours) at 4/11/2024 0857  Last data filed at 4/11/2024 0453  Gross per 24 hour   Intake 860 ml   Output 1975 ml   Net -1115 ml           Physical Exam  Vitals reviewed.   Constitutional:       Appearance: Normal appearance. He is normal weight.   HENT:      Head: Normocephalic.      Right Ear: External ear normal.      Left Ear: External ear normal.      Nose: Nose normal.      Mouth/Throat:      Mouth: Mucous membranes are moist.      Pharynx: Oropharynx is clear.   Eyes:      Extraocular Movements: Extraocular movements intact.      Conjunctiva/sclera: Conjunctivae normal.      Pupils: Pupils are equal, round, and reactive to light.   Cardiovascular:      Rate and Rhythm: Normal rate and regular rhythm.      Pulses: Normal pulses.      Heart sounds: Normal heart sounds.   Pulmonary:      Effort: Pulmonary effort is normal.      Breath sounds: Normal breath sounds.  "  Abdominal:      General: Abdomen is flat. Bowel sounds are normal.      Palpations: Abdomen is soft.   Musculoskeletal:         General: Normal range of motion.      Cervical back: Normal range of motion and neck supple.      Comments: S/P I&D of right ankle, with wound VAC in place.   Skin:     General: Skin is warm.      Capillary Refill: Capillary refill takes less than 2 seconds.   Neurological:      General: No focal deficit present.      Mental Status: He is alert and oriented to person, place, and time. Mental status is at baseline.      Cranial Nerves: No cranial nerve deficit.   Psychiatric:         Mood and Affect: Mood normal.         Behavior: Behavior normal.         Thought Content: Thought content normal.         Judgment: Judgment normal.             Significant Labs: All pertinent labs within the past 24 hours have been reviewed.  A1C:   Recent Labs   Lab 10/26/23  1439 03/20/24  1724   HGBA1C 5.9* 5.8*       Blood Culture: No results for input(s): "LABBLOO" in the last 48 hours.  CBC:   Recent Labs   Lab 04/10/24  0459 04/11/24  0507   WBC 14.64* 15.07*   HGB 7.4* 7.1*   HCT 23.5* 22.1*    345       CMP:   Recent Labs   Lab 04/10/24  0459 04/11/24  0507   * 138   K 4.4 4.3   CL 99 102   CO2 23 21*   * 146*   BUN 38* 25*   CREATININE 7.4* 5.1*   CALCIUM 7.8* 7.4*   PROT 5.4* 4.6*   ALBUMIN 1.4* 1.3*   BILITOT 0.8 0.7   ALKPHOS 87 86   AST 98* 132*   ALT 37 51*   ANIONGAP 13 15       Urine Culture: No results for input(s): "LABURIN" in the last 48 hours.    Significant Imaging: I have reviewed all pertinent imaging results/findings within the past 24 hours.    Assessment/Plan:      * Cellulitis/Abscess  of right foot/ankle  R ankle pain, swelling, warmth present. Potential gout vs cellulitis. 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 No fracture present. Uric acid normal. Arterial US and venous US with no clots, appropriate flow  - on " ceftriaxone/vanc for 7 days and still with pain and now with increased WBC and fevers  - blood cultures currently NGTD  - CT right ankle with no evidence of osteo.  -4/4-Right ankle wound then had new abscess/discharge sp I& D with wound vac placement  -4/6-Repeat OR I&D and wound vac back on. OP wound culture grew e coli. Leukocytosis stable, afebrile.   Per ID ok to transition to po cephalosporin (cefadroxil, 1 gram q72H) to complete 10 days of abx treatment on discharge.      Open wound  -continue wound care and wound VAC management.  -patient on IV antibiotics cefepime 1 g Q 24 hours  -wound culture grew E coli.  -patient was followed by ID and Orthopedics, input appreciated.  -when discharge with transition to p.o. antibiotics cefadroxil 1 g q.72 hours to complete a total of 10 days of treatment.      Paroxysmal atrial fibrillation with RVR  Patient has Paroxysmal (<7 days) atrial fibrillation which is uncontrolled. Patient is currently in atrial fibrillation.  - new onset Afib  - on labetalol for BP at home, eliquis for chronic DVT but did miss some doses of eliquis  - in ED given diltiazem gtt but that caused hypotension  - EKG with Afib RVR with normal Qtc  - started amiodarone with conversion to NSR. Changed to PO amiodarone  - however, he had recurrent Afib and was placed back on amio gtt.   - Cardiology consulted  - plan for SONY/DCCV on 3/22/24 but converted to sinus rhythm. Has been in/out of afib but rate controlled  - continue home eliquis  Hold Eliquis for I&D and possible future procedures.  Restart once done with procedures.    Anemia in chronic kidney disease  Patient's anemia is currently controlled. Has not received any PRBCs to date.   Lab Results   Component Value Date    HGB 7.4 (L) 04/10/2024    HCT 23.5 (L) 04/10/2024   Anemia of CKD plus anemia of acute blood loss as expected from surgical procedures.  Transfused 2 units of blood with adequate correction.  Continue to monitor    Chronic  deep vein thrombosis (DVT) of popliteal vein of right lower extremity 9/21/20 outside US; unprovoked; anticoagulation  Continue holding as patient may need repeat procedure.  Heparin for DVT prophylaxis.  -restart apixaban once procedures done.      History of stroke  PT/OT evaluation completed and recommended moderate intensity therapy  Case management will assist    Secondary hyperparathyroidism of renal origin  Continue renvela       ESRD (end stage renal disease)  ESRD via LUE AVF. Last session 3/18/24. Missed 3/20/24 due to fatigue. Usually MWF  - hyperkalemic on admit. Does not appear volume overloaded.   - Nephrology consulted.  - continue MWF HD    Hemiplegia and hemiparesis following cerebral infarction affecting right dominant side  No signs of acute stroke. Does have RUE weakness.   - continue home asa, statin  - resume apixaban once done with procedures.      Seizure disorder as sequela of cerebrovascular accident  Not currently on antiepileptics. No seizure activity reported. Monitor.       Controlled type 2 diabetes mellitus with chronic kidney disease on chronic dialysis, with long-term current use of insulin  A1c:   Lab Results   Component Value Date    HGBA1C 5.8 (H) 03/20/2024     Meds: SSI PRN to maintain goal 140-180  ADA renal diet, accuchecks, hypoglycemic protocol      Dyslipidemia  Continue statin      Benign essential HTN  Chronic. Latest blood pressure and vitals reviewed-     Temp:  [98.2 °F (36.8 °C)-99.8 °F (37.7 °C)]   Pulse:  [65-83]   Resp:  [13-28]   BP: (104-128)/(53-62)   SpO2:  [94 %-100 %] .   Home meds for hypertension were reviewed and noted below.   Hypertension Medications               amLODIPine (NORVASC) 10 MG tablet Take 1 tablet by mouth once daily    labetaloL (NORMODYNE) 100 MG tablet Take 1 tablet (100 mg total) by mouth once daily.          Continue current management.    Will utilize p.r.n. blood pressure medication only if patient's blood pressure greater than  180/110 and he develops symptoms such as worsening chest pain or shortness of breath.      VTE Risk Mitigation (From admission, onward)           Ordered     heparin (porcine) injection 5,000 Units  Every 12 hours         04/04/24 1602                    Discharge Planning   GARY:      Code Status: Full Code   Is the patient medically ready for discharge?:     Reason for patient still in hospital (select all that apply): Patient trending condition, Treatment, Consult recommendations, and Pending disposition  Discharge Plan A: Home Health, Home with family                 Hipolito Mooney MD  Department of Hospital Medicine   [facility]

## 2024-04-12 LAB
ALBUMIN SERPL BCP-MCNC: 1.2 G/DL (ref 3.5–5.2)
ALP SERPL-CCNC: 81 U/L (ref 55–135)
ALT SERPL W/O P-5'-P-CCNC: 59 U/L (ref 10–44)
ANION GAP SERPL CALC-SCNC: 14 MMOL/L (ref 8–16)
AST SERPL-CCNC: 125 U/L (ref 10–40)
BASOPHILS # BLD AUTO: 0.09 K/UL (ref 0–0.2)
BASOPHILS NFR BLD: 0.6 % (ref 0–1.9)
BILIRUB SERPL-MCNC: 0.6 MG/DL (ref 0.1–1)
BUN SERPL-MCNC: 38 MG/DL (ref 8–23)
CALCIUM SERPL-MCNC: 7.9 MG/DL (ref 8.7–10.5)
CHLORIDE SERPL-SCNC: 101 MMOL/L (ref 95–110)
CO2 SERPL-SCNC: 21 MMOL/L (ref 23–29)
CREAT SERPL-MCNC: 6.6 MG/DL (ref 0.5–1.4)
DIFFERENTIAL METHOD BLD: ABNORMAL
EOSINOPHIL # BLD AUTO: 0.2 K/UL (ref 0–0.5)
EOSINOPHIL NFR BLD: 1.1 % (ref 0–8)
ERYTHROCYTE [DISTWIDTH] IN BLOOD BY AUTOMATED COUNT: 19.2 % (ref 11.5–14.5)
EST. GFR  (NO RACE VARIABLE): 8 ML/MIN/1.73 M^2
GLUCOSE SERPL-MCNC: 142 MG/DL (ref 70–110)
HCT VFR BLD AUTO: 23.2 % (ref 40–54)
HGB BLD-MCNC: 7.3 G/DL (ref 14–18)
IMM GRANULOCYTES # BLD AUTO: 0.24 K/UL (ref 0–0.04)
IMM GRANULOCYTES NFR BLD AUTO: 1.5 % (ref 0–0.5)
LYMPHOCYTES # BLD AUTO: 1.1 K/UL (ref 1–4.8)
LYMPHOCYTES NFR BLD: 7.1 % (ref 18–48)
MCH RBC QN AUTO: 25.6 PG (ref 27–31)
MCHC RBC AUTO-ENTMCNC: 31.5 G/DL (ref 32–36)
MCV RBC AUTO: 81 FL (ref 82–98)
MONOCYTES # BLD AUTO: 1 K/UL (ref 0.3–1)
MONOCYTES NFR BLD: 6.7 % (ref 4–15)
NEUTROPHILS # BLD AUTO: 12.9 K/UL (ref 1.8–7.7)
NEUTROPHILS NFR BLD: 83 % (ref 38–73)
NRBC BLD-RTO: 0 /100 WBC
PLATELET # BLD AUTO: 359 K/UL (ref 150–450)
PMV BLD AUTO: 9.5 FL (ref 9.2–12.9)
POCT GLUCOSE: 172 MG/DL (ref 70–110)
POCT GLUCOSE: 195 MG/DL (ref 70–110)
POCT GLUCOSE: 230 MG/DL (ref 70–110)
POTASSIUM SERPL-SCNC: 4.4 MMOL/L (ref 3.5–5.1)
PROT SERPL-MCNC: 5 G/DL (ref 6–8.4)
RBC # BLD AUTO: 2.85 M/UL (ref 4.6–6.2)
SODIUM SERPL-SCNC: 136 MMOL/L (ref 136–145)
VANCOMYCIN SERPL-MCNC: 7.2 UG/ML
WBC # BLD AUTO: 15.58 K/UL (ref 3.9–12.7)

## 2024-04-12 PROCEDURE — 94761 N-INVAS EAR/PLS OXIMETRY MLT: CPT | Mod: HCNC

## 2024-04-12 PROCEDURE — 63600175 PHARM REV CODE 636 W HCPCS: Mod: HCNC | Performed by: STUDENT IN AN ORGANIZED HEALTH CARE EDUCATION/TRAINING PROGRAM

## 2024-04-12 PROCEDURE — 36415 COLL VENOUS BLD VENIPUNCTURE: CPT | Mod: HCNC | Performed by: INTERNAL MEDICINE

## 2024-04-12 PROCEDURE — 25000003 PHARM REV CODE 250: Mod: HCNC | Performed by: ORTHOPAEDIC SURGERY

## 2024-04-12 PROCEDURE — 80100016 HC MAINTENANCE HEMODIALYSIS: Mod: HCNC

## 2024-04-12 PROCEDURE — 25000003 PHARM REV CODE 250: Mod: HCNC | Performed by: INTERNAL MEDICINE

## 2024-04-12 PROCEDURE — 99233 SBSQ HOSP IP/OBS HIGH 50: CPT | Mod: HCNC,,, | Performed by: STUDENT IN AN ORGANIZED HEALTH CARE EDUCATION/TRAINING PROGRAM

## 2024-04-12 PROCEDURE — 63600175 PHARM REV CODE 636 W HCPCS: Mod: JG,HCNC | Performed by: ORTHOPAEDIC SURGERY

## 2024-04-12 PROCEDURE — 63600175 PHARM REV CODE 636 W HCPCS: Mod: HCNC | Performed by: INTERNAL MEDICINE

## 2024-04-12 PROCEDURE — 27000221 HC OXYGEN, UP TO 24 HOURS: Mod: HCNC

## 2024-04-12 PROCEDURE — 21400001 HC TELEMETRY ROOM: Mod: HCNC

## 2024-04-12 PROCEDURE — 25000003 PHARM REV CODE 250: Mod: HCNC | Performed by: STUDENT IN AN ORGANIZED HEALTH CARE EDUCATION/TRAINING PROGRAM

## 2024-04-12 PROCEDURE — 36415 COLL VENOUS BLD VENIPUNCTURE: CPT | Mod: HCNC | Performed by: ORTHOPAEDIC SURGERY

## 2024-04-12 PROCEDURE — 63600175 PHARM REV CODE 636 W HCPCS: Mod: HCNC | Performed by: ORTHOPAEDIC SURGERY

## 2024-04-12 PROCEDURE — 80202 ASSAY OF VANCOMYCIN: CPT | Mod: HCNC | Performed by: INTERNAL MEDICINE

## 2024-04-12 PROCEDURE — 85025 COMPLETE CBC W/AUTO DIFF WBC: CPT | Mod: HCNC | Performed by: ORTHOPAEDIC SURGERY

## 2024-04-12 PROCEDURE — 80053 COMPREHEN METABOLIC PANEL: CPT | Mod: HCNC | Performed by: ORTHOPAEDIC SURGERY

## 2024-04-12 RX ADMIN — HEPARIN SODIUM 5000 UNITS: 5000 INJECTION INTRAVENOUS; SUBCUTANEOUS at 08:04

## 2024-04-12 RX ADMIN — AMIODARONE HYDROCHLORIDE 400 MG: 200 TABLET ORAL at 08:04

## 2024-04-12 RX ADMIN — FINASTERIDE 5 MG: 5 TABLET, FILM COATED ORAL at 08:04

## 2024-04-12 RX ADMIN — ATORVASTATIN CALCIUM 80 MG: 40 TABLET, FILM COATED ORAL at 08:04

## 2024-04-12 RX ADMIN — VANCOMYCIN HYDROCHLORIDE 1000 MG: 1 INJECTION, POWDER, LYOPHILIZED, FOR SOLUTION INTRAVENOUS at 06:04

## 2024-04-12 RX ADMIN — EPOETIN ALFA-EPBX 5000 UNITS: 10000 INJECTION, SOLUTION INTRAVENOUS; SUBCUTANEOUS at 08:04

## 2024-04-12 RX ADMIN — INSULIN ASPART 1 UNITS: 100 INJECTION, SOLUTION INTRAVENOUS; SUBCUTANEOUS at 09:04

## 2024-04-12 RX ADMIN — HYDROMORPHONE HYDROCHLORIDE 0.5 MG: 1 INJECTION, SOLUTION INTRAMUSCULAR; INTRAVENOUS; SUBCUTANEOUS at 09:04

## 2024-04-12 RX ADMIN — NEPHROCAP 1 CAPSULE: 1 CAP ORAL at 08:04

## 2024-04-12 RX ADMIN — TAMSULOSIN HYDROCHLORIDE 0.8 MG: 0.4 CAPSULE ORAL at 08:04

## 2024-04-12 RX ADMIN — OXYCODONE AND ACETAMINOPHEN 1 TABLET: 10; 325 TABLET ORAL at 07:04

## 2024-04-12 RX ADMIN — CEFTRIAXONE 2 G: 2 INJECTION, POWDER, FOR SOLUTION INTRAMUSCULAR; INTRAVENOUS at 04:04

## 2024-04-12 NOTE — NURSING
[facility] [place]  Nurses Note -- 4 Eyes      4/11/2024       Skin assessed on: Q Shift      [] No Pressure Injuries Present    []Prevention Measures Documented    [x] Yes LDA  for Pressure Injury Previously documented     [] Yes New Pressure Injury Discovered   [] LDA for New Pressure Injury Added      Attending RN:  Luis Goodman RN     Second RN:  DARRION Domingo

## 2024-04-12 NOTE — ASSESSMENT & PLAN NOTE
R ankle pain, swelling, warmth present. Potential gout vs cellulitis. 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 No fracture present. Uric acid normal. Arterial US and venous US with no clots, appropriate flow  - on ceftriaxone/vanc for 7 days and still with pain and now with increased WBC and fevers  - blood cultures currently NGTD  - CT right ankle with no evidence of osteo.  -4/4-Right ankle wound then had new abscess/discharge sp I& D with wound vac placement  -4/6-Repeat OR I&D and wound vac back on. OP wound culture grew e coli. Leukocytosis stable, afebrile.   -04/9 patient had a repeat washout with I&D.  -we will reconsult Infectious Disease due to patient was white blood count increasing despite antibiotics.  -continue daily wound care.  -id adjusted antibiotics, patient was recommended to start on IV Rocephin 2 g IVPB daily renal dose vancomycin to cover for positive osteomyelitis changes seen intraoperatively.  Patient will need total of 6 weeks of antibiotics.  -trend WBC 15.5>15.2

## 2024-04-12 NOTE — PROGRESS NOTES
[facility] Medicine  Progress Note    Patient Name: Miguel Moore  MRN: [id number]  Patient Class: IP- Inpatient   Admission Date: 3/20/2024  Length of Stay: 23 days  Attending Physician: Hipolito Mooney MD  Primary Care Provider: Raciel Raymond MD        Subjective:     Principal Problem:Cellulitis of right foot        HPI:  Mr Miguel Moore is a 68 y.o. man with ESRD on HD, HTN, DM with neuropathy, history of CVA with residual R sided weakness, chronic DVT on eliquis who presented with feeling poorly. He attended his usual dialysis session on Monday 3/18 without issues. He developed fatigue and R ankle swelling. He has some pain with palpation but is able to walk. No reports of trauma. No wounds. No falls. No history of gout. Today he was feeling worse so did not go to dialysis and came to the ED. No fevers. No shortness of breath. Normal appetite. No nausea, vomiting. No chest pain. No palpitations.     In the ED, afebrile with HR initially controlled then to 130s Afib RVR. Started diltiazem but became hypotensive. Diltiazem stopped. Given antibiotics. Admitted for further management.     Overview/Hospital Course:  Mr Miguel Moore is a 68 y.o. man with ESRD on HD, DM who was admitted with new onset Afib RVR, hyperkalemia from missed HD session, and R ankle cellulitis. Started amio gtt with conversion to NSR. TTE EF 55-60%, mild LAE. Cardiology consulted. Now on PO amiodarone and continues home eliquis (on this for chronic DVT). He received HD with resolution of hyperkalemia. He is on CTX for his R ankle cellulitis and was given colchicine in case gout could contribute. He developed recurrent Afib and amio gtt resumed. Cardiology was planning SONY/DCCV on 3/22. But converted to sinus. Transferred to floor 3/26.   Right ankle cellulitis improving however spiking fevers on 3/27 up to 102.7 F.  Vanc/Ceftriaxone changed to Meropenem. CT right ankle with no obvious acute infection, pain is  improving. Right forearm with swelling, u/s negative for abscess or hematoma. Blood cultures negative. Possible medication related fevers and ABx's stopped. 4/4-Right ankle wound then had new abscess/discharge sp I& D with wound vac placement by Orthopedic Surgery.  Anemia of CKD plus anemia of acute blood loss as expected from surgical procedure and patient transfused blood.  Repeat OR I&D and wound vac back on. Operative wound culture grew e coli. ID following for ABX recommendations.  Patient was scheduled for repeat washout, and wound VAC change today.  Patient followed by ID and ortho, input appreciated.  Patient improving postoperatively.  Possible discharge home in the next few days on Friday Orthopedics agrees.  Patient will need home health care with wound care and wound VAC to be set up.  Patient declines SNF.      Interval History:  Patient was seen today for follow-up care.  Patient reports no chest pain or shortness a breath.  Patient was scheduled for dialysis today.  Patient was status post repeat washout of right ankle on 04/09/2024. of the right ankle cellulitis/abscess, and wound VAC change.  Wound culture growing E coli.  Patient followed by ID and Orthopedics, input appreciated.  He will need home health care with wound care and wound VAC to be set up.   to assist with discharge planning.  Patient with increasing WBC despite several washout.  We will reconsult Infectious Disease for input and possible antibiotic adjustments.        Review of Systems   Constitutional: Negative.    HENT: Negative.     Eyes: Negative.    Respiratory: Negative.     Cardiovascular: Negative.    Gastrointestinal: Negative.    Endocrine: Negative.    Genitourinary: Negative.    Musculoskeletal: Negative.    Skin: Negative.    Allergic/Immunologic: Negative.    Neurological: Negative.    Hematological: Negative.    Psychiatric/Behavioral: Negative.       Objective:     Vital Signs (Most Recent):  Temp: 97.4  °F (36.3 °C) (04/12/24 0713)  Pulse: 73 (04/12/24 1056)  Resp: 20 (04/12/24 0713)  BP: (!) 127/59 (04/12/24 0713)  SpO2: 97 % (04/12/24 0713) Vital Signs (24h Range):  Temp:  [97.4 °F (36.3 °C)-99.9 °F (37.7 °C)] 97.4 °F (36.3 °C)  Pulse:  [71-81] 73  Resp:  [18-20] 20  SpO2:  [97 %-99 %] 97 %  BP: (111-130)/(56-60) 127/59     Weight: 90.4 kg (199 lb 4.7 oz)  Body mass index is 30.3 kg/m².    Intake/Output Summary (Last 24 hours) at 4/12/2024 1301  Last data filed at 4/12/2024 0809  Gross per 24 hour   Intake 600 ml   Output --   Net 600 ml           Physical Exam  Vitals reviewed.   Constitutional:       Appearance: Normal appearance. He is normal weight.   HENT:      Head: Normocephalic.      Right Ear: External ear normal.      Left Ear: External ear normal.      Nose: Nose normal.      Mouth/Throat:      Mouth: Mucous membranes are moist.      Pharynx: Oropharynx is clear.   Eyes:      Extraocular Movements: Extraocular movements intact.      Conjunctiva/sclera: Conjunctivae normal.      Pupils: Pupils are equal, round, and reactive to light.   Cardiovascular:      Rate and Rhythm: Normal rate and regular rhythm.      Pulses: Normal pulses.      Heart sounds: Normal heart sounds.   Pulmonary:      Effort: Pulmonary effort is normal.      Breath sounds: Normal breath sounds.   Abdominal:      General: Abdomen is flat. Bowel sounds are normal.      Palpations: Abdomen is soft.   Musculoskeletal:         General: Normal range of motion.      Cervical back: Normal range of motion and neck supple.      Comments: S/P I&D of right ankle, with wound VAC in place.   Skin:     General: Skin is warm.      Capillary Refill: Capillary refill takes less than 2 seconds.   Neurological:      General: No focal deficit present.      Mental Status: He is alert and oriented to person, place, and time. Mental status is at baseline.      Cranial Nerves: No cranial nerve deficit.   Psychiatric:         Mood and Affect: Mood normal.     "     Behavior: Behavior normal.         Thought Content: Thought content normal.         Judgment: Judgment normal.             Significant Labs: All pertinent labs within the past 24 hours have been reviewed.  A1C:   Recent Labs   Lab 10/26/23  1439 03/20/24  1724   HGBA1C 5.9* 5.8*       Blood Culture: No results for input(s): "LABBLOO" in the last 48 hours.  CBC:   Recent Labs   Lab 04/11/24  0507 04/12/24  0412   WBC 15.07* 15.58*   HGB 7.1* 7.3*   HCT 22.1* 23.2*    359       CMP:   Recent Labs   Lab 04/11/24  0507 04/12/24  0412    136   K 4.3 4.4    101   CO2 21* 21*   * 142*   BUN 25* 38*   CREATININE 5.1* 6.6*   CALCIUM 7.4* 7.9*   PROT 4.6* 5.0*   ALBUMIN 1.3* 1.2*   BILITOT 0.7 0.6   ALKPHOS 86 81   * 125*   ALT 51* 59*   ANIONGAP 15 14       Urine Culture: No results for input(s): "LABURIN" in the last 48 hours.    Significant Imaging: I have reviewed all pertinent imaging results/findings within the past 24 hours.    Assessment/Plan:      * Cellulitis/Abscess  of right foot/ankle  R ankle pain, swelling, warmth present. Potential gout vs cellulitis. 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 No fracture present. Uric acid normal. Arterial US and venous US with no clots, appropriate flow  - on ceftriaxone/vanc for 7 days and still with pain and now with increased WBC and fevers  - blood cultures currently NGTD  - CT right ankle with no evidence of osteo.  -4/4-Right ankle wound then had new abscess/discharge sp I& D with wound vac placement  -4/6-Repeat OR I&D and wound vac back on. OP wound culture grew e coli. Leukocytosis stable, afebrile.   -04/9 patient had a repeat washout with I&D.  -we will reconsult Infectious Disease due to patient was white blood count increasing despite antibiotics.  -continue daily wound care.  Per ID ok to transition to po cephalosporin (cefadroxil, 1 gram q72H) to complete 10 days of abx treatment on " discharge.      Open wound  -continue wound care and wound VAC management.  -patient on IV antibiotics cefepime 1 g Q 24 hours  -wound culture grew E coli.  -patient was followed by ID and Orthopedics, input appreciated.  -when discharge with transition to p.o. antibiotics cefadroxil 1 g q.72 hours to complete a total of 10 days of treatment.      Paroxysmal atrial fibrillation with RVR  Patient has Paroxysmal (<7 days) atrial fibrillation which is uncontrolled. Patient is currently in atrial fibrillation.  - new onset Afib  - on labetalol for BP at home, eliquis for chronic DVT but did miss some doses of eliquis  - in ED given diltiazem gtt but that caused hypotension  - EKG with Afib RVR with normal Qtc  - started amiodarone with conversion to NSR. Changed to PO amiodarone  - however, he had recurrent Afib and was placed back on amio gtt.   - Cardiology consulted  - plan for SONY/DCCV on 3/22/24 but converted to sinus rhythm. Has been in/out of afib but rate controlled  - continue home eliquis  Hold Eliquis for I&D and possible future procedures.  Restart once done with procedures.    Anemia in chronic kidney disease  Patient's anemia is currently controlled. Has not received any PRBCs to date.   Lab Results   Component Value Date    HGB 7.4 (L) 04/10/2024    HCT 23.5 (L) 04/10/2024   Anemia of CKD plus anemia of acute blood loss as expected from surgical procedures.  Transfused 2 units of blood with adequate correction.  Continue to monitor    Chronic deep vein thrombosis (DVT) of popliteal vein of right lower extremity 9/21/20 outside US; unprovoked; anticoagulation  Continue holding as patient may need repeat procedure.  Heparin for DVT prophylaxis.  -restart apixaban once procedures done.      History of stroke  PT/OT evaluation completed and recommended moderate intensity therapy  Case management will assist    Secondary hyperparathyroidism of renal origin  Continue renvela       ESRD (end stage renal  disease)  ESRD via LUE AVF. Last session 3/18/24. Missed 3/20/24 due to fatigue. Usually MWF  - hyperkalemic on admit. Does not appear volume overloaded.   - Nephrology consulted.  - continue MWF HD    Hemiplegia and hemiparesis following cerebral infarction affecting right dominant side  No signs of acute stroke. Does have RUE weakness.   - continue home asa, statin  - resume apixaban once done with procedures.      Seizure disorder as sequela of cerebrovascular accident  Not currently on antiepileptics. No seizure activity reported. Monitor.       Controlled type 2 diabetes mellitus with chronic kidney disease on chronic dialysis, with long-term current use of insulin  A1c:   Lab Results   Component Value Date    HGBA1C 5.8 (H) 03/20/2024     Meds: SSI PRN to maintain goal 140-180  ADA renal diet, accuchecks, hypoglycemic protocol      Dyslipidemia  Continue statin      Benign essential HTN  Chronic. Latest blood pressure and vitals reviewed-     Temp:  [98.2 °F (36.8 °C)-99.8 °F (37.7 °C)]   Pulse:  [65-83]   Resp:  [13-28]   BP: (104-128)/(53-62)   SpO2:  [94 %-100 %] .   Home meds for hypertension were reviewed and noted below.   Hypertension Medications               amLODIPine (NORVASC) 10 MG tablet Take 1 tablet by mouth once daily    labetaloL (NORMODYNE) 100 MG tablet Take 1 tablet (100 mg total) by mouth once daily.          Continue current management.    Will utilize p.r.n. blood pressure medication only if patient's blood pressure greater than 180/110 and he develops symptoms such as worsening chest pain or shortness of breath.      VTE Risk Mitigation (From admission, onward)           Ordered     heparin (porcine) injection 5,000 Units  Every 12 hours         04/04/24 1602                    Discharge Planning   GARY:      Code Status: Full Code   Is the patient medically ready for discharge?:     Reason for patient still in hospital (select all that apply): Patient trending condition, Laboratory  test, Treatment, and Consult recommendations  Discharge Plan A: Home Health, Home with family   Discharge Delays: (!) Procedure Scheduling (IR, OR, Labs, Echo, Cath, Echo, EEG)              Hipolito Mooney MD  Department of Hospital Medicine   [facility]

## 2024-04-12 NOTE — SUBJECTIVE & OBJECTIVE
Interval History: ID called back due to WBC of 15. Last seen by ID on 4/7 for antibiotic recommendations. Had R ankle abscess with I&D cultures growing e. Coli. Has been cefepime since 4/3 with plan to complete 10 day course. Received about 10 days of vancomycin prior to I&D. He does have a history of MRSA growing from a wound on his right leg. Went for I&D on 4/4 and 4/6. 4/6 washout mentions necrotic tissue in proximity to his distal fibula.     Today, he was seen in dialysis so evaluation was limited. He reports no new/worsening symptoms. Ankle feels overall improved from admission. Denies cough, abdominal pain/cramping.     Review of Systems   Constitutional:  Negative for fever.   Respiratory:  Negative for cough.    Gastrointestinal:  Negative for abdominal pain, diarrhea and vomiting.   Skin:  Positive for wound.   All other systems reviewed and are negative.    Objective:     Vital Signs (Most Recent):  Temp: 97.4 °F (36.3 °C) (04/12/24 0713)  Pulse: 73 (04/12/24 1056)  Resp: 20 (04/12/24 0713)  BP: (!) 127/59 (04/12/24 0713)  SpO2: 97 % (04/12/24 0713) Vital Signs (24h Range):  Temp:  [97.4 °F (36.3 °C)-99.9 °F (37.7 °C)] 97.4 °F (36.3 °C)  Pulse:  [71-81] 73  Resp:  [18-20] 20  SpO2:  [97 %-99 %] 97 %  BP: (111-130)/(56-60) 127/59     Weight: 90.4 kg (199 lb 4.7 oz)  Body mass index is 30.3 kg/m².    Estimated Creatinine Clearance: 11.5 mL/min (A) (based on SCr of 6.6 mg/dL (H)).     Physical Exam  Vitals and nursing note reviewed.   Cardiovascular:      Comments: RUE PIV non-tender and non-erythematous, LUE HD access  Skin:     General: Skin is warm and dry.      Comments: RLE wrapped with wound vac   Neurological:      General: No focal deficit present.      Mental Status: He is alert and oriented to person, place, and time.   Psychiatric:         Mood and Affect: Mood normal.         Behavior: Behavior normal.          Significant Labs:   Microbiology Results (last 7 days)       Procedure Component  Value Units Date/Time    Culture, Anaerobe [[id number]] Collected: 04/04/24 1110    Order Status: Completed Specimen: Incision site from Ankle, Right Updated: 04/08/24 0901     Anaerobic Culture No anaerobes isolated    Narrative:      Right Extensor Retinaculum    Culture, Anaerobe [[id number]] Collected: 04/04/24 1115    Order Status: Completed Specimen: Wound from Ankle, Right Updated: 04/08/24 0900     Anaerobic Culture No anaerobes isolated    Narrative:      Right ankle wound    Aerobic culture [[id number]]  (Abnormal) Collected: 04/04/24 1115    Order Status: Completed Specimen: Wound from Ankle, Right Updated: 04/06/24 0943     Aerobic Bacterial Culture ESCHERICHIA COLI  Rare  For susceptibility see order # [id number]      Narrative:      Right ankle wound    Aerobic culture [[id number]]  (Abnormal)  (Susceptibility) Collected: 04/04/24 1110    Order Status: Completed Specimen: Incision site from Ankle, Right Updated: 04/06/24 0942     Aerobic Bacterial Culture ESCHERICHIA COLI  Few      Narrative:      Right Extensor Retinaculum            Significant Imaging: I have reviewed all pertinent imaging results/findings within the past 24 hours.

## 2024-04-12 NOTE — PT/OT/SLP PROGRESS
Physical Therapy      Patient Name:  Miguel Moore   MRN:  [id number]    Patient not seen today secondary to Dialysis. Will follow-up as able later hour/day .

## 2024-04-12 NOTE — PROGRESS NOTES
[facility] Surg  Infectious Disease  Progress Note    Patient Name: Miguel Moore  MRN: [id number]  Admission Date: 3/20/2024  Length of Stay: 23 days  Attending Physician: Hipolito Mooney MD  Primary Care Provider: Raciel Raymond MD    Isolation Status: No active isolations  Assessment/Plan:      ID  * Cellulitis/Abscess  of right foot/ankle  Miguel Moore is a 68 year old man with ESRD on HD, DM and CVA with R sided weakness admitted on 3/21 for fatigue, hyperkalemia, found to have Afib with RVR. CT ankle from 3/28 suggestive of cellulitis, no evidence of osteomyelitis. He was on vancomycin from 3/20-3/29 and then 4/3-4/5. As well as ceftriaxone and then meropenem from 3/20-3/29. Leg started draining and found to have abscess. Went for I&D 4/4 and 4/6. Surgical cultures from 4/4 growing E. Coli. Operative noted on 4/6 notable for necrotic tissue around distal fibula. His fevers have now resolved but his WBC has remained around 15 since his I&D. He does have a history of MRSA infection of that R ankle in 2021. Given his persistent leukocytosis and progression of necrotic tissue to near his distal fibula would add MRSA coverage with vancomycin and treat for osteomyelitis with 6 week course.     Recommendations  - Stop cefepime  - start ceftriaxone 2 grams q24 hours given after HD on HD days (at discharge can convert to three times weekly dosing)  - start vancomycin goal trough 15-20 mcg/ml, dosing per pharmacy   - will discuss surgical plan and state of his surgical site with orthopedics      Above discussed with primary team.     Time: 50 minutes   50% of time spent on face-to-face counseling and coordination of care. Counseling included review of test results, diagnosis, and treatment plan with patient and/or family.  I have reviewed hospital notes from HM service and other specialty providers. I have also reviewed CBC, CMP/BMP,  cultures and imaging with my interpretation as documented.        Anticipated Disposition: TBD    Thank you for your consult. I will follow-up with patient. Please contact us if you have any additional questions.    Melinda Casillas MD  Infectious Disease  [facility] [place] - Med Surg    Subjective:     Principal Problem:Cellulitis of right foot    HPI: 69 yo male with ESRD on HD, DM and CVA with R sided weakness admitted for fatigue, hyperkalemia, found to have Afib with RVR. ID consulted for fevers. Admission course notable for R ankle cellulitis, CT with extensive subcutaneous edema, no focal fluid collection or OM seen. He spiked a fever on 3/27 and 3/28 - blood cx obtained ngtd. TTE on 3/20 without IE. Pt is currently on vancomycin and meropenem. Prior to admission, pt reported he had pain in R ankle, denied fevers chills, sick contacts. He states that since he's been here, he has had some loose stools and RUE swelling. Reported hives with PCN in ithe past, tolerated carb/cephs.   Interval History: ID called back due to WBC of 15. Last seen by ID on 4/7 for antibiotic recommendations. Had R ankle abscess with I&D cultures growing e. Coli. Has been cefepime since 4/3 with plan to complete 10 day course. Received about 10 days of vancomycin prior to I&D. He does have a history of MRSA growing from a wound on his right leg. Went for I&D on 4/4 and 4/6. 4/6 washout mentions necrotic tissue in proximity to his distal fibula.     Today, he was seen in dialysis so evaluation was limited. He reports no new/worsening symptoms. Ankle feels overall improved from admission. Denies cough, abdominal pain/cramping.     Review of Systems   Constitutional:  Negative for fever.   Respiratory:  Negative for cough.    Gastrointestinal:  Negative for abdominal pain, diarrhea and vomiting.   Skin:  Positive for wound.   All other systems reviewed and are negative.    Objective:     Vital Signs (Most Recent):  Temp: 97.4 °F (36.3 °C) (04/12/24 0713)  Pulse: 73 (04/12/24 1056)  Resp: 20 (04/12/24  0713)  BP: (!) 127/59 (04/12/24 0713)  SpO2: 97 % (04/12/24 0713) Vital Signs (24h Range):  Temp:  [97.4 °F (36.3 °C)-99.9 °F (37.7 °C)] 97.4 °F (36.3 °C)  Pulse:  [71-81] 73  Resp:  [18-20] 20  SpO2:  [97 %-99 %] 97 %  BP: (111-130)/(56-60) 127/59     Weight: 90.4 kg (199 lb 4.7 oz)  Body mass index is 30.3 kg/m².    Estimated Creatinine Clearance: 11.5 mL/min (A) (based on SCr of 6.6 mg/dL (H)).     Physical Exam  Vitals and nursing note reviewed.   Cardiovascular:      Comments: RUE PIV non-tender and non-erythematous, LUE HD access  Skin:     General: Skin is warm and dry.      Comments: RLE wrapped with wound vac   Neurological:      General: No focal deficit present.      Mental Status: He is alert and oriented to person, place, and time.   Psychiatric:         Mood and Affect: Mood normal.         Behavior: Behavior normal.          Significant Labs:   Microbiology Results (last 7 days)       Procedure Component Value Units Date/Time    Culture, Anaerobe [[id number]] Collected: 04/04/24 1110    Order Status: Completed Specimen: Incision site from Ankle, Right Updated: 04/08/24 0901     Anaerobic Culture No anaerobes isolated    Narrative:      Right Extensor Retinaculum    Culture, Anaerobe [[id number]] Collected: 04/04/24 1115    Order Status: Completed Specimen: Wound from Ankle, Right Updated: 04/08/24 0900     Anaerobic Culture No anaerobes isolated    Narrative:      Right ankle wound    Aerobic culture [[id number]]  (Abnormal) Collected: 04/04/24 1115    Order Status: Completed Specimen: Wound from Ankle, Right Updated: 04/06/24 0943     Aerobic Bacterial Culture ESCHERICHIA COLI  Rare  For susceptibility see order # [id number]      Narrative:      Right ankle wound    Aerobic culture [[id number]]  (Abnormal)  (Susceptibility) Collected: 04/04/24 1110    Order Status: Completed Specimen: Incision site from Ankle, Right Updated: 04/06/24 0942     Aerobic Bacterial Culture ESCHERICHIA COLI  Few       Narrative:      Right Extensor Retinaculum            Significant Imaging: I have reviewed all pertinent imaging results/findings within the past 24 hours.

## 2024-04-12 NOTE — PROGRESS NOTES
The patient is in bed.  He reports he is doing okay.  Family is in the room.    Temp:  [97.4 °F (36.3 °C)-99.9 °F (37.7 °C)] 97.9 °F (36.6 °C)  Pulse:  [70-94] 81  Resp:  [18-20] 18  SpO2:  [86 %-99 %] 99 %  BP: (107-121)/(46-59) 119/56    Physical examination:    Right leg wound VAC is in place.  It is functioning appropriately.      Recent Labs   Lab 04/11/24  0507   WBC 15.07*   RBC 2.72*   HGB 7.1*   HCT 22.1*      MCV 81*   MCH 26.1*   MCHC 32.1         Current Facility-Administered Medications:     0.9%  NaCl infusion (for blood administration), , Intravenous, Q24H PRN, Jimbo Montilla III, MD    0.9%  NaCl infusion, , Intravenous, Once, Jimbo Montilla III, MD    acetaminophen tablet 650 mg, 650 mg, Oral, Q6H PRN, Jimbo Montilla III, MD    amiodarone tablet 400 mg, 400 mg, Oral, BID, Jimbo Montilla III, MD, 400 mg at 04/11/24 0938    atorvastatin tablet 80 mg, 80 mg, Oral, Daily, Jimbo Montilla III, MD, 80 mg at 04/11/24 0938    ceFEPIme (MAXIPIME) 1 g in dextrose 5 % in water (D5W) 100 mL IVPB (MB+), 1 g, Intravenous, Q24H, Jimbo Montilla III, MD, Stopped at 04/10/24 2130    dextrose 10% bolus 125 mL 125 mL, 12.5 g, Intravenous, PRN, Jimbo Montilla III, MD    dextrose 10% bolus 250 mL 250 mL, 25 g, Intravenous, PRN, Jimbo Montilla III, MD    diphenoxylate-atropine 2.5-0.025 mg per tablet 1 tablet, 1 tablet, Oral, Q6H PRN, Jimbo Montilla III, MD, 1 tablet at 04/02/24 2334    epoetin marisol-epbx injection 5,000 Units, 5,000 Units, Subcutaneous, Every Mon, Wed, Fri, Jimbo Montilla III, MD, 5,000 Units at 04/10/24 0814    finasteride tablet 5 mg, 5 mg, Oral, Daily, Jimbo Montilla III, MD, 5 mg at 04/11/24 0938    glucagon (human recombinant) injection 1 mg, 1 mg, Intramuscular, PRN, Jimbo Montilla III, MD    glucose chewable tablet 16 g, 16 g, Oral, PRN, Jimbo Montilla III, MD    glucose chewable tablet 24 g, 24 g, Oral, PRN, Jimbo Montilla III, MD    heparin  (porcine) injection 5,000 Units, 5,000 Units, Subcutaneous, Q12H, Jimbo Montilla III, MD, 5,000 Units at 04/11/24 0938    HYDROmorphone injection 0.5 mg, 0.5 mg, Intravenous, Q4H PRN, Jimbo Montilla III, MD, 0.5 mg at 04/11/24 0015    insulin aspart U-100 pen 0-5 Units, 0-5 Units, Subcutaneous, QID (AC + HS) PRN, Jimbo Montilla III, MD, 2 Units at 04/11/24 1215    melatonin tablet 6 mg, 6 mg, Oral, Nightly PRN, Jimbo Montilla III, MD, 6 mg at 04/08/24 2046    naloxone 0.4 mg/mL injection 0.02 mg, 0.02 mg, Intravenous, PRN, Jimbo Montilla III, MD    ondansetron injection 4 mg, 4 mg, Intravenous, Q6H PRN, Jimbo Montilla III, MD, 4 mg at 04/10/24 0710    oxyCODONE-acetaminophen  mg per tablet 1 tablet, 1 tablet, Oral, Q4H PRN, Jimbo Montilla III, MD, 1 tablet at 04/11/24 1129    prochlorperazine injection Soln 5 mg, 5 mg, Intravenous, Q6H PRN, Jimbo Montilla III, MD    sodium chloride 0.9% bolus 250 mL 250 mL, 250 mL, Intravenous, PRN, Jimbo Montilla III, MD    sodium chloride 0.9% bolus 250 mL 250 mL, 250 mL, Intravenous, PRN, Jimbo Montilla III, MD    tamsulosin 24 hr capsule 0.8 mg, 2 capsule, Oral, Daily, Jimbo Montilla III, MD, 0.8 mg at 04/11/24 0938    vitamin renal formula (B-complex-vitamin c-folic acid) 1 mg per capsule 1 capsule, 1 capsule, Oral, Daily, Jimbo Montilla III, MD, 1 capsule at 04/11/24 0938    Assessment and Plan:    68-year-old male with history of end-stage renal disease on dialysis presents with right leg infection. He underwent I&D of his right leg abscess on 04/04 and repeat I and D and replacement of wound VAC on 4/6 and 4/9.    Cultures revealed E coli, on cefepime.     Leukocytosis continues now increased to 15.      Wound VAC dressing change with wound care prior to discharge and twice a week.     May need additional surgery for grafting of wounds in the future but too early to tell at this point.    Patient reports he does not want to go home  until Saturday.    Jimbo Montilla MD  [facility]  [phone]  This note has been transcribed with voice recognition software, but not reviewed and may contain unrecognized errors.

## 2024-04-12 NOTE — PLAN OF CARE
Problem: Adult Inpatient Plan of Care  Goal: Absence of Hospital-Acquired Illness or Injury  Outcome: Ongoing, Progressing  Intervention: Identify and Manage Fall Risk  Flowsheets (Taken 4/12/2024 0207)  Safety Promotion/Fall Prevention:   assistive device/personal item within reach   bed alarm set   side rails raised x 3  Goal: Optimal Comfort and Wellbeing  Outcome: Ongoing, Progressing  Intervention: Monitor Pain and Promote Comfort  Flowsheets (Taken 4/12/2024 0207)  Pain Management Interventions:   care clustered   medication offered   pillow support provided   position adjusted   quiet environment facilitated  Intervention: Provide Person-Centered Care  Flowsheets (Taken 4/12/2024 0207)  Trust Relationship/Rapport:   care explained   choices provided   emotional support provided   empathic listening provided   questions answered   questions encouraged   reassurance provided   thoughts/feelings acknowledged

## 2024-04-12 NOTE — PROGRESS NOTES
Miguel Moore is a 68 y.o. male patient.    Follow for ESRD, dialysis    Patient seen while on dialysis  No new c/o, comfortable    Scheduled Meds:   sodium chloride 0.9%   Intravenous Once    amiodarone  400 mg Oral BID    atorvastatin  80 mg Oral Daily    ceFEPime IV (PEDS and ADULTS)  1 g Intravenous Q24H    epoetin marisol-epbx  5,000 Units Subcutaneous Every Mon, Wed, Fri    finasteride  5 mg Oral Daily    heparin (porcine)  5,000 Units Subcutaneous Q12H    tamsulosin  2 capsule Oral Daily    vitamin renal formula (B-complex-vitamin c-folic acid)  1 capsule Oral Daily       Review of patient's allergies indicates:   Allergen Reactions    Ace inhibitors      Hyperkalemia 8/2018  Other reaction(s): Unknown    Penicillins Hives     Tolerated cefepime and cefdinir previously    Tizanidine      Felt hot         Vital Signs Range (Last 24H):  Temp:  [97.4 °F (36.3 °C)-99.9 °F (37.7 °C)]   Pulse:  [71-81]   Resp:  [18-20]   BP: (111-130)/(56-60)   SpO2:  [97 %-99 %]     I & O (Last 24H):  Intake/Output Summary (Last 24 hours) at 4/12/2024 1045  Last data filed at 4/11/2024 1835  Gross per 24 hour   Intake 480 ml   Output --   Net 480 ml           Physical Exam:  General appearance: well developed, no distress  Lungs:  clear to auscultation bilaterally and normal respiratory effort  Heart: regular rate and rhythm  Abdomen:  soft, normal bowel sounds  Extremities: no cyanosis or edema, or clubbing    Laboratory:  I have reviewed all pertinent lab results within the past 24 hours.  CBC:   Recent Labs   Lab 04/12/24 0412   WBC 15.58*   RBC 2.85*   HGB 7.3*   HCT 23.2*      MCV 81*   MCH 25.6*   MCHC 31.5*     CMP:   Recent Labs   Lab 04/12/24 0412   *   CALCIUM 7.9*   ALBUMIN 1.2*   PROT 5.0*      K 4.4   CO2 21*      BUN 38*   CREATININE 6.6*   ALKPHOS 81   ALT 59*   *   BILITOT 0.6       Assessment & Plan    ESRD - on dialysis, stable, tolerated well  (R) foot  cellulitis/osteomyelitis  DM type 2  HTN  PAF  Anemia in CKD    Continue present RX  We'll follow for HD qMWF        Trac T Sailaja  4/12/2024     Patient with one or more new problems requiring additional work-up/treatment.

## 2024-04-12 NOTE — ASSESSMENT & PLAN NOTE
Miguel Moore is a 68 year old man with ESRD on HD, DM and CVA with R sided weakness admitted on 3/21 for fatigue, hyperkalemia, found to have Afib with RVR. CT ankle from 3/28 suggestive of cellulitis, no evidence of osteomyelitis. He was on vancomycin from 3/20-3/29 and then 4/3-4/5. As well as ceftriaxone and then meropenem from 3/20-3/29. Leg started draining and found to have abscess. Went for I&D 4/4 and 4/6. Surgical cultures from 4/4 growing E. Coli. Operative noted on 4/6 notable for necrotic tissue around distal fibula. His fevers have now resolved but his WBC has remained around 15 since his I&D. He does have a history of MRSA infection of that R ankle in 2021. Given his persistent leukocytosis and progression of necrotic tissue to near his distal fibula would add MRSA coverage with vancomycin and treat for osteomyelitis with 6 week course.     Recommendations  - Stop cefepime  - start ceftriaxone 2 grams q24 hours given after HD on HD days (at discharge can convert to three times weekly dosing)  - start vancomycin goal trough 15-20 mcg/ml, dosing per pharmacy   - will discuss surgical plan and state of his surgical site with orthopedics

## 2024-04-12 NOTE — PROGRESS NOTES
Pharmacokinetic Assessment Follow Up: IV Vancomycin    Vancomycin serum concentration assessment(s):    The random level was drawn correctly and can be used to guide therapy at this time. The measurement is below the desired definitive target range of 15 to 20 mcg/mL.    Vancomycin Regimen Plan:  Give vancomycin 1000mg ivpb today    Re-dose when the random level is less than 20 mcg/mL, next level to be drawn at 04:00 on 4/15/24.    Drug levels (last 3 results):  Recent Labs   Lab Result Units 04/12/24  1626   Vancomycin, Random ug/mL 7.2       Pharmacy will continue to follow and monitor vancomycin.    Please contact pharmacy at extension [phone] for questions regarding this assessment.    Thank you for the consult,   Rose Tong       Patient brief summary:  Miguel Moore is a 68 y.o. male initiated on antimicrobial therapy with IV Vancomycin for treatment of bone/joint infection        Drug Allergies:   Review of patient's allergies indicates:   Allergen Reactions    Ace inhibitors      Hyperkalemia 8/2018  Other reaction(s): Unknown    Penicillins Hives     Tolerated cefepime and cefdinir previously    Tizanidine      Felt hot       Actual Body Weight:   90.4 kg    Renal Function:   Estimated Creatinine Clearance: 11.5 mL/min (A) (based on SCr of 6.6 mg/dL (H)).,     Dialysis Method (if applicable):  N/A    CBC (last 72 hours):  Recent Labs   Lab Result Units 04/10/24  0459 04/11/24  0507 04/12/24  0412   WBC K/uL 14.64* 15.07* 15.58*   Hemoglobin g/dL 7.4* 7.1* 7.3*   Hematocrit % 23.5* 22.1* 23.2*   Platelets K/uL 320 345 359   Gran % % 82.8* 84.2* 83.0*   Lymph % % 7.5* 5.7* 7.1*   Mono % % 6.8 7.2 6.7   Eosinophil % % 0.8 0.8 1.1   Basophil % % 0.5 0.5 0.6   Differential Method  Automated Automated Automated       Metabolic Panel (last 72 hours):  Recent Labs   Lab Result Units 04/10/24  0459 04/11/24  0507 04/12/24  0412   Sodium mmol/L 135* 138 136   Potassium mmol/L 4.4 4.3 4.4   Chloride mmol/L 99 102  101   CO2 mmol/L 23 21* 21*   Glucose mg/dL 162* 146* 142*   BUN mg/dL 38* 25* 38*   Creatinine mg/dL 7.4* 5.1* 6.6*   Albumin g/dL 1.4* 1.3* 1.2*   Total Bilirubin mg/dL 0.8 0.7 0.6   Alkaline Phosphatase U/L 87 86 81   AST U/L 98* 132* 125*   ALT U/L 37 51* 59*       Vancomycin Administrations:  vancomycin given in the last 96 hours        No antibiotic orders with administrations found.                    Microbiologic Results:  Microbiology Results (last 7 days)       Procedure Component Value Units Date/Time    Culture, Anaerobe [[id number]] Collected: 04/04/24 1110    Order Status: Completed Specimen: Incision site from Ankle, Right Updated: 04/08/24 0901     Anaerobic Culture No anaerobes isolated    Narrative:      Right Extensor Retinaculum    Culture, Anaerobe [[id number]] Collected: 04/04/24 1115    Order Status: Completed Specimen: Wound from Ankle, Right Updated: 04/08/24 0900     Anaerobic Culture No anaerobes isolated    Narrative:      Right ankle wound    Aerobic culture [[id number]]  (Abnormal) Collected: 04/04/24 1115    Order Status: Completed Specimen: Wound from Ankle, Right Updated: 04/06/24 0943     Aerobic Bacterial Culture ESCHERICHIA COLI  Rare  For susceptibility see order # [id number]      Narrative:      Right ankle wound    Aerobic culture [[id number]]  (Abnormal)  (Susceptibility) Collected: 04/04/24 1110    Order Status: Completed Specimen: Incision site from Ankle, Right Updated: 04/06/24 0942     Aerobic Bacterial Culture ESCHERICHIA COLI  Few      Narrative:      Right Extensor Retinaculum

## 2024-04-12 NOTE — ASSESSMENT & PLAN NOTE
R ankle pain, swelling, warmth present. Potential gout vs cellulitis. 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 No fracture present. Uric acid normal. Arterial US and venous US with no clots, appropriate flow  - on ceftriaxone/vanc for 7 days and still with pain and now with increased WBC and fevers  - blood cultures currently NGTD  - CT right ankle with no evidence of osteo.  -4/4-Right ankle wound then had new abscess/discharge sp I& D with wound vac placement  -4/6-Repeat OR I&D and wound vac back on. OP wound culture grew e coli. Leukocytosis stable, afebrile.   -04/10 patient had a repeat washout with I&D.  -we will reconsult Infectious Disease due to patient was white blood count increasing despite antibiotics.  -continue daily wound care.  Per ID ok to transition to po cephalosporin (cefadroxil, 1 gram q72H) to complete 10 days of abx treatment on discharge.

## 2024-04-12 NOTE — PT/OT/SLP PROGRESS
Occupational Therapy      Patient Name:  Miguel Moore   MRN:  [id number]    Patient not seen today secondary to Dialysis. Will follow-up as able.    4/12/2024

## 2024-04-12 NOTE — SUBJECTIVE & OBJECTIVE
Interval History:  Patient was seen today for follow-up care.  Patient reports no chest pain or shortness a breath.  Patient was scheduled for dialysis today.  Patient was status post repeat washout of right ankle on 04/09/2024. of the right ankle cellulitis/abscess, and wound VAC change.  Wound culture growing E coli.  Patient followed by ID and Orthopedics, input appreciated.  He will need home health care with wound care and wound VAC to be set up.   to assist with discharge planning.        Review of Systems   Constitutional: Negative.    HENT: Negative.     Eyes: Negative.    Respiratory: Negative.     Cardiovascular: Negative.    Gastrointestinal: Negative.    Endocrine: Negative.    Genitourinary: Negative.    Musculoskeletal: Negative.    Skin: Negative.    Allergic/Immunologic: Negative.    Neurological: Negative.    Hematological: Negative.    Psychiatric/Behavioral: Negative.       Objective:     Vital Signs (Most Recent):  Temp: 97.4 °F (36.3 °C) (04/12/24 0713)  Pulse: 73 (04/12/24 1056)  Resp: 20 (04/12/24 0713)  BP: (!) 127/59 (04/12/24 0713)  SpO2: 97 % (04/12/24 0713) Vital Signs (24h Range):  Temp:  [97.4 °F (36.3 °C)-99.9 °F (37.7 °C)] 97.4 °F (36.3 °C)  Pulse:  [71-81] 73  Resp:  [18-20] 20  SpO2:  [97 %-99 %] 97 %  BP: (111-130)/(56-60) 127/59     Weight: 90.4 kg (199 lb 4.7 oz)  Body mass index is 30.3 kg/m².    Intake/Output Summary (Last 24 hours) at 4/12/2024 1301  Last data filed at 4/12/2024 0809  Gross per 24 hour   Intake 600 ml   Output --   Net 600 ml           Physical Exam  Vitals reviewed.   Constitutional:       Appearance: Normal appearance. He is normal weight.   HENT:      Head: Normocephalic.      Right Ear: External ear normal.      Left Ear: External ear normal.      Nose: Nose normal.      Mouth/Throat:      Mouth: Mucous membranes are moist.      Pharynx: Oropharynx is clear.   Eyes:      Extraocular Movements: Extraocular movements intact.       "Conjunctiva/sclera: Conjunctivae normal.      Pupils: Pupils are equal, round, and reactive to light.   Cardiovascular:      Rate and Rhythm: Normal rate and regular rhythm.      Pulses: Normal pulses.      Heart sounds: Normal heart sounds.   Pulmonary:      Effort: Pulmonary effort is normal.      Breath sounds: Normal breath sounds.   Abdominal:      General: Abdomen is flat. Bowel sounds are normal.      Palpations: Abdomen is soft.   Musculoskeletal:         General: Normal range of motion.      Cervical back: Normal range of motion and neck supple.      Comments: S/P I&D of right ankle, with wound VAC in place.   Skin:     General: Skin is warm.      Capillary Refill: Capillary refill takes less than 2 seconds.   Neurological:      General: No focal deficit present.      Mental Status: He is alert and oriented to person, place, and time. Mental status is at baseline.      Cranial Nerves: No cranial nerve deficit.   Psychiatric:         Mood and Affect: Mood normal.         Behavior: Behavior normal.         Thought Content: Thought content normal.         Judgment: Judgment normal.             Significant Labs: All pertinent labs within the past 24 hours have been reviewed.  A1C:   Recent Labs   Lab 10/26/23  1439 03/20/24  1724   HGBA1C 5.9* 5.8*       Blood Culture: No results for input(s): "LABBLOO" in the last 48 hours.  CBC:   Recent Labs   Lab 04/11/24  0507 04/12/24  0412   WBC 15.07* 15.58*   HGB 7.1* 7.3*   HCT 22.1* 23.2*    359       CMP:   Recent Labs   Lab 04/11/24  0507 04/12/24  0412    136   K 4.3 4.4    101   CO2 21* 21*   * 142*   BUN 25* 38*   CREATININE 5.1* 6.6*   CALCIUM 7.4* 7.9*   PROT 4.6* 5.0*   ALBUMIN 1.3* 1.2*   BILITOT 0.7 0.6   ALKPHOS 86 81   * 125*   ALT 51* 59*   ANIONGAP 15 14       Urine Culture: No results for input(s): "LABURIN" in the last 48 hours.    Significant Imaging: I have reviewed all pertinent imaging results/findings within the " past 24 hours.

## 2024-04-12 NOTE — PROGRESS NOTES
[facility] [place] Med Surg  Adult Nutrition  Consult Note    SUMMARY     Recommendations    1. Continue Renal Diet; monitoring PO intake of meals and snacks  2. Monitor PO intake with diet advancement   3. Continue to monitor weights and labs.   4. Collaboration with medical providers    Goals: 1. Pt to meet % EEN/EPN by RD follow up  Nutrition Goal Status: continues  Communication of RD Recs:  (POC)    Assessment and Plan    Nutrition Problem  diabetes    Related to (etiology):   DM    Signs and Symptoms (as evidenced by):   Hemoglobin A1C   Date Value Ref Range Status   03/20/2024 5.8 (H) 4.0 - 5.6 % Final     Comment:     ADA Screening Guidelines:  5.7-6.4%  Consistent with prediabetes  >or=6.5%  Consistent with diabetes    High levels of fetal hemoglobin interfere with the HbA1C  assay. Heterozygous hemoglobin variants (HbS, HgC, etc)do  not significantly interfere with this assay.   However, presence of multiple variants may affect accuracy.     10/26/2023 5.9 (H) 4.0 - 5.6 % Final     Comment:     ADA Screening Guidelines:  5.7-6.4%  Consistent with prediabetes  >or=6.5%  Consistent with diabetes    High levels of fetal hemoglobin interfere with the HbA1C  assay. Heterozygous hemoglobin variants (HbS, HgC, etc)do  not significantly interfere with this assay.   However, presence of multiple variants may affect accuracy.     04/27/2023 5.9 (H) 4.0 - 5.6 % Final     Comment:     ADA Screening Guidelines:  5.7-6.4%  Consistent with prediabetes  >or=6.5%  Consistent with diabetes    High levels of fetal hemoglobin interfere with the HbA1C  assay. Heterozygous hemoglobin variants (HbS, HgC, etc)do  not significantly interfere with this assay.   However, presence of multiple variants may affect accuracy.           Interventions/Recommendations (treatment strategy):  Collaboration with medical providers    Nutrition Diagnosis Status:   continues      Reason for Assessment    Reason For Assessment: length of  "stay  Diagnosis:  (paroxysmal atrial fibrillation with RVR)  Relevant Medical History: .pmh  Interdisciplinary Rounds: did not attend  General Information Comments: RD follow up. Pt currently on Renal Diet with 100% intake. BMI 30.30, obese grade l. Pt continues on isolation precautions. RD to continue to monitor and follow.      RD follow up. Pt currently NPO previously on renal ada diet with varied intake % since admit. BMI 30.30, obese grade l. No recent significant weight changes. Pt continues on isolation precautions. RD to continues to monitor and follow up.     Pt LOS > 8 days. Pt admitted with paroxysmal atrial fibrillation with RVR. Pmhx of  DM ll. ESRD, stroke and open wound. Currently on renal dm diet with varied intake % since admit. BMI 27.99, overweight. No recent significant weight changes per chart review. NFPE not able to be performed at this time, pt is on isolation precautions  Nutrition Discharge Planning: renal ADA    Nutrition Risk Screen    Nutrition Risk Screen: no indicators present    Nutrition/Diet History    Spiritual, Cultural Beliefs, Rastafarian Practices, Values that Affect Care: no  Food Allergies: NKFA    Anthropometrics    Temp: 97.4 °F (36.3 °C)  Height Method: Stated  Height: 5' 8" (172.7 cm)  Height (inches): 68 in  Weight Method: Bed Scale  Weight: 90.4 kg (199 lb 4.7 oz)  Weight (lb): 199.3 lb  Ideal Body Weight (IBW), Male: 154 lb  % Ideal Body Weight, Male (lb): 119.54 %  BMI (Calculated): 30.3  BMI Grade: 25 - 29.9 - overweight       Lab/Procedures/Meds    Pertinent Labs Reviewed: reviewed  BMP  Lab Results   Component Value Date     04/12/2024    K 4.4 04/12/2024     04/12/2024    CO2 21 (L) 04/12/2024    BUN 38 (H) 04/12/2024    CREATININE 6.6 (H) 04/12/2024    CALCIUM 7.9 (L) 04/12/2024    ANIONGAP 14 04/12/2024    EGFRNORACEVR 8 (A) 04/12/2024       Pertinent Medications Reviewed: reviewed  Current Outpatient Medications   Medication Instructions "    amLODIPine (NORVASC) 10 MG tablet Take 1 tablet by mouth once daily    apixaban (ELIQUIS) 5 mg, Oral, 2 times daily    aspirin (ECOTRIN) 81 mg, Oral, Daily    atorvastatin (LIPITOR) 80 mg, Oral, Daily    finasteride (PROSCAR) 5 mg, Oral, Daily    iron sucrose in NS (VENOFER) 50 mg    labetaloL (NORMODYNE) 100 mg, Oral, Daily    methoxy peg-epoetin beta (MIRCERA INJ) 75 mcg    mupirocin (BACTROBAN) 2 % ointment Apply to affected area 3 times daily    RENVELA 800 mg, Oral, 3 times daily with meals    sildenafiL (VIAGRA) 100 mg, Oral, Daily PRN    tamsulosin (FLOMAX) 0.8 mg, Oral, Daily          Estimated/Assessed Needs    Weight Used For Calorie Calculations: 83.5 kg (184 lb 1.4 oz)  Energy Calorie Requirements (kcal): 1888 kcal  Energy Need Method: [place]-St Jeor (x 1.2)  Protein Requirements: 84 g  Weight Used For Protein Calculations: 83.5 kg (184 lb 1.4 oz)     Estimated Fluid Requirement Method: RDA Method  RDA Method (mL): 1888         Nutrition Prescription Ordered    Current Diet Order: renal ada    Evaluation of Received Nutrient/Fluid Intake    I/O: i/o  Energy Calories Required: not meeting needs  Protein Required: not meeting needs  Fluid Required: not meeting needs  Comments: lbm 3/26/24  % Intake of Estimated Energy Needs: 25 - 50 %  % Meal Intake: 25 - 50 %    Nutrition Risk    Level of Risk/Frequency of Follow-up: low       Monitor and Evaluation    Food and Nutrient Intake: food and beverage intake  Food and Nutrient Adminstration: diet order  Knowledge/Beliefs/Attitudes: food and nutrition knowledge/skill, beliefs and attitudes  Physical Activity and Function: nutrition-related ADLs and IADLs, factors affecting access to physical activity  Anthropometric Measurements: weight, weight change, body mass index  Biochemical Data, Medical Tests and Procedures: electrolyte and renal panel  Nutrition-Focused Physical Findings: overall appearance       Nutrition Follow-Up    RD Follow-up?: Yes    Katie  Oswaldo, Registration Eligible, Provisional LDN

## 2024-04-12 NOTE — PLAN OF CARE
Recommendations    1. Continue Renal Diet; monitoring PO intake of meals and snacks  2. Monitor PO intake with diet advancement   3. Continue to monitor weights and labs.   4. Collaboration with medical providers    Goals: 1. Pt to meet % EEN/EPN by RD follow up  Nutrition Goal Status: continues  Communication of RD Recs:  (POC)    Assessment and Plan    Nutrition Problem  diabetes    Related to (etiology):   DM    Signs and Symptoms (as evidenced by):   Hemoglobin A1C   Date Value Ref Range Status   03/20/2024 5.8 (H) 4.0 - 5.6 % Final     Comment:     ADA Screening Guidelines:  5.7-6.4%  Consistent with prediabetes  >or=6.5%  Consistent with diabetes    High levels of fetal hemoglobin interfere with the HbA1C  assay. Heterozygous hemoglobin variants (HbS, HgC, etc)do  not significantly interfere with this assay.   However, presence of multiple variants may affect accuracy.     10/26/2023 5.9 (H) 4.0 - 5.6 % Final     Comment:     ADA Screening Guidelines:  5.7-6.4%  Consistent with prediabetes  >or=6.5%  Consistent with diabetes    High levels of fetal hemoglobin interfere with the HbA1C  assay. Heterozygous hemoglobin variants (HbS, HgC, etc)do  not significantly interfere with this assay.   However, presence of multiple variants may affect accuracy.     04/27/2023 5.9 (H) 4.0 - 5.6 % Final     Comment:     ADA Screening Guidelines:  5.7-6.4%  Consistent with prediabetes  >or=6.5%  Consistent with diabetes    High levels of fetal hemoglobin interfere with the HbA1C  assay. Heterozygous hemoglobin variants (HbS, HgC, etc)do  not significantly interfere with this assay.   However, presence of multiple variants may affect accuracy.           Interventions/Recommendations (treatment strategy):  Collaboration with medical providers    Nutrition Diagnosis Status:   continues

## 2024-04-13 LAB
ALBUMIN SERPL BCP-MCNC: 1.3 G/DL (ref 3.5–5.2)
ALP SERPL-CCNC: 81 U/L (ref 55–135)
ALT SERPL W/O P-5'-P-CCNC: 72 U/L (ref 10–44)
ANION GAP SERPL CALC-SCNC: 9 MMOL/L (ref 8–16)
AST SERPL-CCNC: 134 U/L (ref 10–40)
BASOPHILS # BLD AUTO: 0.08 K/UL (ref 0–0.2)
BASOPHILS NFR BLD: 0.5 % (ref 0–1.9)
BILIRUB SERPL-MCNC: 0.5 MG/DL (ref 0.1–1)
BUN SERPL-MCNC: 29 MG/DL (ref 8–23)
CALCIUM SERPL-MCNC: 8 MG/DL (ref 8.7–10.5)
CHLORIDE SERPL-SCNC: 98 MMOL/L (ref 95–110)
CO2 SERPL-SCNC: 26 MMOL/L (ref 23–29)
CREAT SERPL-MCNC: 5.2 MG/DL (ref 0.5–1.4)
DIFFERENTIAL METHOD BLD: ABNORMAL
EOSINOPHIL # BLD AUTO: 0.2 K/UL (ref 0–0.5)
EOSINOPHIL NFR BLD: 1 % (ref 0–8)
ERYTHROCYTE [DISTWIDTH] IN BLOOD BY AUTOMATED COUNT: 18.6 % (ref 11.5–14.5)
EST. GFR  (NO RACE VARIABLE): 11 ML/MIN/1.73 M^2
GLUCOSE SERPL-MCNC: 165 MG/DL (ref 70–110)
HCT VFR BLD AUTO: 22.3 % (ref 40–54)
HGB BLD-MCNC: 7 G/DL (ref 14–18)
IMM GRANULOCYTES # BLD AUTO: 0.31 K/UL (ref 0–0.04)
IMM GRANULOCYTES NFR BLD AUTO: 2 % (ref 0–0.5)
LYMPHOCYTES # BLD AUTO: 0.8 K/UL (ref 1–4.8)
LYMPHOCYTES NFR BLD: 4.9 % (ref 18–48)
MCH RBC QN AUTO: 25.8 PG (ref 27–31)
MCHC RBC AUTO-ENTMCNC: 31.4 G/DL (ref 32–36)
MCV RBC AUTO: 82 FL (ref 82–98)
MONOCYTES # BLD AUTO: 0.9 K/UL (ref 0.3–1)
MONOCYTES NFR BLD: 5.8 % (ref 4–15)
NEUTROPHILS # BLD AUTO: 13.1 K/UL (ref 1.8–7.7)
NEUTROPHILS NFR BLD: 85.8 % (ref 38–73)
NRBC BLD-RTO: 0 /100 WBC
PLATELET # BLD AUTO: 373 K/UL (ref 150–450)
PMV BLD AUTO: 9.2 FL (ref 9.2–12.9)
POCT GLUCOSE: 138 MG/DL (ref 70–110)
POCT GLUCOSE: 153 MG/DL (ref 70–110)
POCT GLUCOSE: 178 MG/DL (ref 70–110)
POCT GLUCOSE: 183 MG/DL (ref 70–110)
POTASSIUM SERPL-SCNC: 4.3 MMOL/L (ref 3.5–5.1)
PROT SERPL-MCNC: 5.2 G/DL (ref 6–8.4)
RBC # BLD AUTO: 2.71 M/UL (ref 4.6–6.2)
SODIUM SERPL-SCNC: 133 MMOL/L (ref 136–145)
WBC # BLD AUTO: 15.25 K/UL (ref 3.9–12.7)

## 2024-04-13 PROCEDURE — 25000003 PHARM REV CODE 250: Mod: HCNC | Performed by: ORTHOPAEDIC SURGERY

## 2024-04-13 PROCEDURE — 63600175 PHARM REV CODE 636 W HCPCS: Mod: HCNC | Performed by: STUDENT IN AN ORGANIZED HEALTH CARE EDUCATION/TRAINING PROGRAM

## 2024-04-13 PROCEDURE — 99232 SBSQ HOSP IP/OBS MODERATE 35: CPT | Mod: HCNC,,, | Performed by: STUDENT IN AN ORGANIZED HEALTH CARE EDUCATION/TRAINING PROGRAM

## 2024-04-13 PROCEDURE — 80053 COMPREHEN METABOLIC PANEL: CPT | Mod: HCNC | Performed by: ORTHOPAEDIC SURGERY

## 2024-04-13 PROCEDURE — 36415 COLL VENOUS BLD VENIPUNCTURE: CPT | Mod: HCNC | Performed by: ORTHOPAEDIC SURGERY

## 2024-04-13 PROCEDURE — 85025 COMPLETE CBC W/AUTO DIFF WBC: CPT | Mod: HCNC | Performed by: ORTHOPAEDIC SURGERY

## 2024-04-13 PROCEDURE — 25000003 PHARM REV CODE 250: Mod: HCNC | Performed by: INTERNAL MEDICINE

## 2024-04-13 PROCEDURE — 63600175 PHARM REV CODE 636 W HCPCS: Mod: HCNC | Performed by: ORTHOPAEDIC SURGERY

## 2024-04-13 PROCEDURE — 25000003 PHARM REV CODE 250: Mod: HCNC | Performed by: STUDENT IN AN ORGANIZED HEALTH CARE EDUCATION/TRAINING PROGRAM

## 2024-04-13 PROCEDURE — 21400001 HC TELEMETRY ROOM: Mod: HCNC

## 2024-04-13 PROCEDURE — 87040 BLOOD CULTURE FOR BACTERIA: CPT | Mod: HCNC | Performed by: STUDENT IN AN ORGANIZED HEALTH CARE EDUCATION/TRAINING PROGRAM

## 2024-04-13 PROCEDURE — 36415 COLL VENOUS BLD VENIPUNCTURE: CPT | Mod: HCNC | Performed by: STUDENT IN AN ORGANIZED HEALTH CARE EDUCATION/TRAINING PROGRAM

## 2024-04-13 PROCEDURE — 94761 N-INVAS EAR/PLS OXIMETRY MLT: CPT | Mod: HCNC

## 2024-04-13 RX ORDER — FERROUS GLUCONATE 324(37.5)
324 TABLET ORAL 2 TIMES DAILY WITH MEALS
Status: DISCONTINUED | OUTPATIENT
Start: 2024-04-13 | End: 2024-04-19 | Stop reason: HOSPADM

## 2024-04-13 RX ADMIN — Medication 324 MG: at 08:04

## 2024-04-13 RX ADMIN — HEPARIN SODIUM 5000 UNITS: 5000 INJECTION INTRAVENOUS; SUBCUTANEOUS at 08:04

## 2024-04-13 RX ADMIN — MELATONIN TAB 3 MG 6 MG: 3 TAB at 08:04

## 2024-04-13 RX ADMIN — AMIODARONE HYDROCHLORIDE 400 MG: 200 TABLET ORAL at 08:04

## 2024-04-13 RX ADMIN — Medication 324 MG: at 04:04

## 2024-04-13 RX ADMIN — ONDANSETRON 4 MG: 2 INJECTION INTRAMUSCULAR; INTRAVENOUS at 03:04

## 2024-04-13 RX ADMIN — NEPHROCAP 1 CAPSULE: 1 CAP ORAL at 08:04

## 2024-04-13 RX ADMIN — FINASTERIDE 5 MG: 5 TABLET, FILM COATED ORAL at 08:04

## 2024-04-13 RX ADMIN — PROCHLORPERAZINE EDISYLATE 5 MG: 5 INJECTION INTRAMUSCULAR; INTRAVENOUS at 08:04

## 2024-04-13 RX ADMIN — CEFTRIAXONE 2 G: 2 INJECTION, POWDER, FOR SOLUTION INTRAMUSCULAR; INTRAVENOUS at 04:04

## 2024-04-13 RX ADMIN — ATORVASTATIN CALCIUM 80 MG: 40 TABLET, FILM COATED ORAL at 08:04

## 2024-04-13 RX ADMIN — TAMSULOSIN HYDROCHLORIDE 0.8 MG: 0.4 CAPSULE ORAL at 08:04

## 2024-04-13 NOTE — PROGRESS NOTES
[facility] Surg  Infectious Disease  Progress Note    Patient Name: Miguel Moore  MRN: [id number]  Admission Date: 3/20/2024  Length of Stay: 24 days  Attending Physician: Hipolito Mooney MD  Primary Care Provider: Raciel Raymond MD    Isolation Status: No active isolations  Assessment/Plan:      ID  * Cellulitis/Abscess  of right foot/ankle  Miguel Moore is a 68 year old man with ESRD on HD, DM and CVA with R sided weakness admitted on 3/21 for fatigue, hyperkalemia, found to have Afib with RVR. CT ankle from 3/28 suggestive of cellulitis, no evidence of osteomyelitis. He was on vancomycin from 3/20-3/29 and then 4/3-4/5. As well as ceftriaxone and then meropenem from 3/20-3/29. Leg started draining and found to have abscess. Went for I&D 4/4 and 4/6. Surgical cultures from 4/4 growing E. Coli. Operative noted on 4/6 notable for necrotic tissue around distal fibula. His fevers have now resolved but his WBC has remained around 15 since his I&D. He does have a history of MRSA infection of that R ankle in 2021. Given his persistent leukocytosis and progression of necrotic tissue to near his distal fibula would add MRSA coverage with vancomycin and treat for osteomyelitis with 6 week course.     Recommendations  - continue ceftriaxone 2 grams q24 hours given after HD on HD days (at discharge can convert to three times weekly dosing)  - continue vancomycin goal trough 15-20 mcg/ml, dosing per pharmacy   - blood cultures ordered given his fever       Above discussed with primary team.     Time: 35 minutes   50% of time spent on face-to-face counseling and coordination of care. Counseling included review of test results, diagnosis, and treatment plan with patient and/or family.  I have reviewed hospital notes from HM service and other specialty providers. I have also reviewed CBC, CMP/BMP,  cultures and imaging with my interpretation as documented.     Anticipated Disposition: TBD    Thank you for your  consult. I will follow-up with patient. Please contact us if you have any additional questions.    Melinda Casillas MD  Infectious Disease  [facility] - Med Surg    Subjective:     Principal Problem:Cellulitis of right foot    HPI: 69 yo male with ESRD on HD, DM and CVA with R sided weakness admitted for fatigue, hyperkalemia, found to have Afib with RVR. ID consulted for fevers. Admission course notable for R ankle cellulitis, CT with extensive subcutaneous edema, no focal fluid collection or OM seen. He spiked a fever on 3/27 and 3/28 - blood cx obtained ngtd. TTE on 3/20 without IE. Pt is currently on vancomycin and meropenem. Prior to admission, pt reported he had pain in R ankle, denied fevers chills, sick contacts. He states that since he's been here, he has had some loose stools and RUE swelling. Reported hives with PCN in ithe past, tolerated carb/cephs.   Interval History: febrile overnight. No new complaints.     Review of Systems   All other systems reviewed and are negative.    Objective:     Vital Signs (Most Recent):  Temp: 99 °F (37.2 °C) (04/13/24 0832)  Pulse: 67 (04/13/24 1146)  Resp: 16 (04/13/24 0832)  BP: (!) 109/56 (04/13/24 0832)  SpO2: 97 % (04/13/24 0832) Vital Signs (24h Range):  Temp:  [98.9 °F (37.2 °C)-103.4 °F (39.7 °C)] 99 °F (37.2 °C)  Pulse:  [67-81] 67  Resp:  [16-20] 16  SpO2:  [96 %-99 %] 97 %  BP: (109-148)/(49-76) 109/56     Weight: 90.4 kg (199 lb 4.7 oz)  Body mass index is 30.3 kg/m².    Estimated Creatinine Clearance: 14.6 mL/min (A) (based on SCr of 5.2 mg/dL (H)).     Physical Exam  Vitals and nursing note reviewed.   Constitutional:       General: He is not in acute distress.     Appearance: Normal appearance.   HENT:      Head: Normocephalic.      Mouth/Throat:      Mouth: Mucous membranes are moist.      Pharynx: Oropharynx is clear.   Cardiovascular:      Comments: LUE AV fistula, RUE PIV  Pulmonary:      Effort: Pulmonary effort is normal. No respiratory distress.    Abdominal:      General: There is no distension.      Palpations: Abdomen is soft.      Tenderness: There is no abdominal tenderness.   Musculoskeletal:      Comments: RLE wrapped with wound vac   Skin:     General: Skin is warm and dry.          Significant Labs:   Microbiology Results (last 7 days)       Procedure Component Value Units Date/Time    Culture, Anaerobe [[id number]] Collected: 04/04/24 1110    Order Status: Completed Specimen: Incision site from Ankle, Right Updated: 04/08/24 0901     Anaerobic Culture No anaerobes isolated    Narrative:      Right Extensor Retinaculum    Culture, Anaerobe [[id number]] Collected: 04/04/24 1115    Order Status: Completed Specimen: Wound from Ankle, Right Updated: 04/08/24 0900     Anaerobic Culture No anaerobes isolated    Narrative:      Right ankle wound            Significant Imaging: I have reviewed all pertinent imaging results/findings within the past 24 hours.

## 2024-04-13 NOTE — ASSESSMENT & PLAN NOTE
Chronic. Latest blood pressure and vitals reviewed-     Temp:  [98 °F (36.7 °C)-103.4 °F (39.7 °C)]   Pulse:  [67-81]   Resp:  [16-20]   BP: (108-148)/(49-76)   SpO2:  [96 %-99 %] .   Home meds for hypertension were reviewed and noted below.   Hypertension Medications               amLODIPine (NORVASC) 10 MG tablet Take 1 tablet by mouth once daily    labetaloL (NORMODYNE) 100 MG tablet Take 1 tablet (100 mg total) by mouth once daily.          Continue current management.    Will utilize p.r.n. blood pressure medication only if patient's blood pressure greater than 180/110 and he develops symptoms such as worsening chest pain or shortness of breath.

## 2024-04-13 NOTE — PLAN OF CARE
Problem: Adult Inpatient Plan of Care  Goal: Plan of Care Review  Outcome: Ongoing, Progressing  Goal: Patient-Specific Goal (Individualized)  Outcome: Ongoing, Progressing  Goal: Absence of Hospital-Acquired Illness or Injury  Outcome: Ongoing, Progressing  Goal: Optimal Comfort and Wellbeing  Outcome: Ongoing, Progressing     Problem: Diabetes Comorbidity  Goal: Blood Glucose Level Within Targeted Range  Outcome: Ongoing, Progressing     Problem: Device-Related Complication Risk (Hemodialysis)  Goal: Safe, Effective Therapy Delivery  Outcome: Ongoing, Progressing     Problem: Hemodynamic Instability (Hemodialysis)  Goal: Effective Tissue Perfusion  Outcome: Ongoing, Progressing     Problem: Infection (Hemodialysis)  Goal: Absence of Infection Signs and Symptoms  Outcome: Ongoing, Progressing     Problem: Skin Injury Risk Increased  Goal: Skin Health and Integrity  Outcome: Ongoing, Progressing     Problem: Impaired Wound Healing  Goal: Optimal Wound Healing  Outcome: Ongoing, Progressing     Problem: Fall Injury Risk  Goal: Absence of Fall and Fall-Related Injury  Outcome: Ongoing, Progressing     Problem: Pain Acute  Goal: Acceptable Pain Control and Functional Ability  Outcome: Ongoing, Progressing

## 2024-04-13 NOTE — PROGRESS NOTES
[facility] Medicine  Progress Note    Patient Name: Miguel Moore  MRN: [id number]  Patient Class: IP- Inpatient   Admission Date: 3/20/2024  Length of Stay: 24 days  Attending Physician: Hipolito Mooney MD  Primary Care Provider: Raciel Raymond MD        Subjective:     Principal Problem:Cellulitis of right foot        HPI:  Mr Miguel Moore is a 68 y.o. man with ESRD on HD, HTN, DM with neuropathy, history of CVA with residual R sided weakness, chronic DVT on eliquis who presented with feeling poorly. He attended his usual dialysis session on Monday 3/18 without issues. He developed fatigue and R ankle swelling. He has some pain with palpation but is able to walk. No reports of trauma. No wounds. No falls. No history of gout. Today he was feeling worse so did not go to dialysis and came to the ED. No fevers. No shortness of breath. Normal appetite. No nausea, vomiting. No chest pain. No palpitations.     In the ED, afebrile with HR initially controlled then to 130s Afib RVR. Started diltiazem but became hypotensive. Diltiazem stopped. Given antibiotics. Admitted for further management.     Overview/Hospital Course:  Mr Miguel Moore is a 68 y.o. man with ESRD on HD, DM who was admitted with new onset Afib RVR, hyperkalemia from missed HD session, and R ankle cellulitis. Started amio gtt with conversion to NSR. TTE EF 55-60%, mild LAE. Cardiology consulted. Now on PO amiodarone and continues home eliquis (on this for chronic DVT). He received HD with resolution of hyperkalemia. He is on CTX for his R ankle cellulitis and was given colchicine in case gout could contribute. He developed recurrent Afib and amio gtt resumed. Cardiology was planning SONY/DCCV on 3/22. But converted to sinus. Transferred to floor 3/26.   Right ankle cellulitis improving however spiking fevers on 3/27 up to 102.7 F.  Vanc/Ceftriaxone changed to Meropenem. CT right ankle with no obvious acute infection, pain is  improving. Right forearm with swelling, u/s negative for abscess or hematoma. Blood cultures negative. Possible medication related fevers and ABx's stopped. 4/4-Right ankle wound then had new abscess/discharge sp I& D with wound vac placement by Orthopedic Surgery.  Anemia of CKD plus anemia of acute blood loss as expected from surgical procedure and patient transfused blood.  Repeat OR I&D and wound vac back on. Operative wound culture grew e coli. ID following for ABX recommendations.  Patient was scheduled for repeat washout, and wound VAC change today.  Patient followed by ID and ortho, input appreciated.  Patient improving postoperatively.  Possible discharge home in the next few days on Friday Orthopedics agrees.  Patient will need home health care with wound care and wound VAC to be set up.  Patient declines SNF.  Patient seen by infectious disease re-consultation, input appreciated.  Patient was concerns for possible osteomyelitis, recommended adjusting antibiotics to Rocephin 2 g IV q.day and renal dose vancomycin, he will need antibiotics for up to 6 weeks.      Interval History:  Patient was seen today for follow-up care.  Patient reports no chest pain or shortness a breath.  Patient was antibiotics adjusted by Infectious Disease due to concerns of persistent leukocytosis despite right ankle cellulitis/abscess washout x3.  Patient antibiotics were recommended to be changed to IV Rocephin and vancomycin, for total of 6 weeks to cover for possible osteomyelitis changes seen intraoperatively.  Patient receiving wound care, has wound VAC in place.  Patient followed by Nephrology for ESRD.  Patient on Epogen and iron supplement for his anemia.              Review of Systems   Constitutional: Negative.    HENT: Negative.     Eyes: Negative.    Respiratory: Negative.     Cardiovascular: Negative.    Gastrointestinal: Negative.    Endocrine: Negative.    Genitourinary: Negative.    Musculoskeletal: Negative.     Skin: Negative.    Allergic/Immunologic: Negative.    Neurological: Negative.    Hematological: Negative.    Psychiatric/Behavioral: Negative.       Objective:     Vital Signs (Most Recent):  Temp: 99 °F (37.2 °C) (04/13/24 0832)  Pulse: 81 (04/13/24 0832)  Resp: 16 (04/13/24 0832)  BP: (!) 109/56 (04/13/24 0832)  SpO2: 97 % (04/13/24 0832) Vital Signs (24h Range):  Temp:  [98 °F (36.7 °C)-103.4 °F (39.7 °C)] 99 °F (37.2 °C)  Pulse:  [67-81] 81  Resp:  [16-20] 16  SpO2:  [96 %-99 %] 97 %  BP: (108-148)/(49-76) 109/56     Weight: 90.4 kg (199 lb 4.7 oz)  Body mass index is 30.3 kg/m².    Intake/Output Summary (Last 24 hours) at 4/13/2024 0917  Last data filed at 4/12/2024 1849  Gross per 24 hour   Intake 860 ml   Output 3000 ml   Net -2140 ml         Physical Exam  Vitals reviewed.   Constitutional:       Appearance: Normal appearance. He is normal weight.   HENT:      Head: Normocephalic.      Right Ear: External ear normal.      Left Ear: External ear normal.      Nose: Nose normal.      Mouth/Throat:      Mouth: Mucous membranes are moist.      Pharynx: Oropharynx is clear.   Eyes:      Extraocular Movements: Extraocular movements intact.      Conjunctiva/sclera: Conjunctivae normal.      Pupils: Pupils are equal, round, and reactive to light.   Cardiovascular:      Rate and Rhythm: Normal rate and regular rhythm.      Pulses: Normal pulses.      Heart sounds: Normal heart sounds.   Pulmonary:      Effort: Pulmonary effort is normal.      Breath sounds: Normal breath sounds.   Abdominal:      General: Abdomen is flat. Bowel sounds are normal.      Palpations: Abdomen is soft.   Musculoskeletal:         General: Normal range of motion.      Cervical back: Normal range of motion and neck supple.      Comments: S/P I&D of right ankle, with wound VAC in place.   Skin:     General: Skin is warm.      Capillary Refill: Capillary refill takes less than 2 seconds.   Neurological:      General: No focal deficit present.  "     Mental Status: He is alert and oriented to person, place, and time. Mental status is at baseline.      Cranial Nerves: No cranial nerve deficit.   Psychiatric:         Mood and Affect: Mood normal.         Behavior: Behavior normal.         Thought Content: Thought content normal.         Judgment: Judgment normal.             Significant Labs: All pertinent labs within the past 24 hours have been reviewed.  A1C:   Recent Labs   Lab 10/26/23  1439 03/20/24  1724   HGBA1C 5.9* 5.8*     Blood Culture: No results for input(s): "LABBLOO" in the last 48 hours.  CBC:   Recent Labs   Lab 04/12/24 0412 04/13/24 0524   WBC 15.58* 15.25*   HGB 7.3* 7.0*   HCT 23.2* 22.3*    373     CMP:   Recent Labs   Lab 04/12/24 0412 04/13/24 0524    133*   K 4.4 4.3    98   CO2 21* 26   * 165*   BUN 38* 29*   CREATININE 6.6* 5.2*   CALCIUM 7.9* 8.0*   PROT 5.0* 5.2*   ALBUMIN 1.2* 1.3*   BILITOT 0.6 0.5   ALKPHOS 81 81   * 134*   ALT 59* 72*   ANIONGAP 14 9         Significant Imaging: I have reviewed all pertinent imaging results/findings within the past 24 hours.    Assessment/Plan:      * Cellulitis/Abscess  of right foot/ankle  R ankle pain, swelling, warmth present. Potential gout vs cellulitis. 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 No fracture present. Uric acid normal. Arterial US and venous US with no clots, appropriate flow  - on ceftriaxone/vanc for 7 days and still with pain and now with increased WBC and fevers  - blood cultures currently NGTD  - CT right ankle with no evidence of osteo.  -4/4-Right ankle wound then had new abscess/discharge sp I& D with wound vac placement  -4/6-Repeat OR I&D and wound vac back on. OP wound culture grew e coli. Leukocytosis stable, afebrile.   -04/9 patient had a repeat washout with I&D.  -we will reconsult Infectious Disease due to patient was white blood count increasing despite antibiotics.  -continue daily wound " care.  -id adjusted antibiotics, patient was recommended to start on IV Rocephin 2 g IVPB daily renal dose vancomycin to cover for positive osteomyelitis changes seen intraoperatively.  Patient will need total of 6 weeks of antibiotics.  -trend WBC 15.5>15.2        Open wound  -continue wound care and wound VAC management.  -patient on IV antibiotics cefepime 1 g Q 24 hours  -wound culture grew E coli.  -patient was followed by ID and Orthopedics, input appreciated.  -antibiotics adjusted by Infectious Disease, input appreciated.  Patient was started on IV Rocephin 2 g IVPB daily and renal dose vancomycin to cover for possible osteomyelitis changes seen intraoperatively.  Patient will need total of 6 weeks of IV antibiotics.  -trend WBC, down to 15.2        Paroxysmal atrial fibrillation with RVR  Patient has Paroxysmal (<7 days) atrial fibrillation which is uncontrolled. Patient is currently in atrial fibrillation.  - new onset Afib  - on labetalol for BP at home, eliquis for chronic DVT but did miss some doses of eliquis  - in ED given diltiazem gtt but that caused hypotension  - EKG with Afib RVR with normal Qtc  - started amiodarone with conversion to NSR. Changed to PO amiodarone  - however, he had recurrent Afib and was placed back on amio gtt.   - Cardiology consulted  - plan for SONY/DCCV on 3/22/24 but converted to sinus rhythm. Has been in/out of afib but rate controlled  - continue home eliquis  Hold Eliquis for I&D and possible future procedures.  Restart once done with procedures.    Anemia in chronic kidney disease  Patient's anemia is currently controlled. Has not received any PRBCs to date.   Lab Results   Component Value Date    HGB 7.0 (L) 04/13/2024    HCT 22.3 (L) 04/13/2024   Anemia of CKD plus anemia of acute blood loss as expected from surgical procedures.  Transfused 2 units of blood with adequate correction.  -patient receiving Epogen and iron supplement.  Continue to monitor    Chronic deep  vein thrombosis (DVT) of popliteal vein of right lower extremity 9/21/20 outside [place]; unprovoked; anticoagulation  Continue holding as patient may need repeat procedure.  Heparin for DVT prophylaxis.  -restart apixaban once procedures done.      History of stroke  PT/OT evaluation completed and recommended moderate intensity therapy  Case management will assist    Secondary hyperparathyroidism of renal origin  Continue renvela       ESRD (end stage renal disease)  ESRD via LUE AVF. Last session 3/18/24. Missed 3/20/24 due to fatigue. Usually MWF  - hyperkalemic on admit. Does not appear volume overloaded.   - Nephrology consulted.  - continue MWF HD    Hemiplegia and hemiparesis following cerebral infarction affecting right dominant side  No signs of acute stroke. Does have RUE weakness.   - continue home asa, statin  - resume apixaban once done with procedures.      Seizure disorder as sequela of cerebrovascular accident  Not currently on antiepileptics. No seizure activity reported. Monitor.       Controlled type 2 diabetes mellitus with chronic kidney disease on chronic dialysis, with long-term current use of insulin  A1c:   Lab Results   Component Value Date    HGBA1C 5.8 (H) 03/20/2024     Meds: SSI PRN to maintain goal 140-180  ADA renal diet, accuchecks, hypoglycemic protocol      Dyslipidemia  Continue statin      Benign essential HTN  Chronic. Latest blood pressure and vitals reviewed-     Temp:  [98 °F (36.7 °C)-103.4 °F (39.7 °C)]   Pulse:  [67-81]   Resp:  [16-20]   BP: (108-148)/(49-76)   SpO2:  [96 %-99 %] .   Home meds for hypertension were reviewed and noted below.   Hypertension Medications               amLODIPine (NORVASC) 10 MG tablet Take 1 tablet by mouth once daily    labetaloL (NORMODYNE) 100 MG tablet Take 1 tablet (100 mg total) by mouth once daily.          Continue current management.    Will utilize p.r.n. blood pressure medication only if patient's blood pressure greater than 180/110 and  he develops symptoms such as worsening chest pain or shortness of breath.      VTE Risk Mitigation (From admission, onward)           Ordered     heparin (porcine) injection 5,000 Units  Every 12 hours         04/04/24 1602                    Discharge Planning   GARY:      Code Status: Full Code   Is the patient medically ready for discharge?:     Reason for patient still in hospital (select all that apply): Patient trending condition, Laboratory test, Treatment, Consult recommendations, and Pending disposition  Discharge Plan A: Home Health, Home with family               Hipolito Mooney MD  Department of Hospital Medicine   [facility]

## 2024-04-13 NOTE — SUBJECTIVE & OBJECTIVE
Interval History: febrile overnight. No new complaints.     Review of Systems   All other systems reviewed and are negative.    Objective:     Vital Signs (Most Recent):  Temp: 99 °F (37.2 °C) (04/13/24 0832)  Pulse: 67 (04/13/24 1146)  Resp: 16 (04/13/24 0832)  BP: (!) 109/56 (04/13/24 0832)  SpO2: 97 % (04/13/24 0832) Vital Signs (24h Range):  Temp:  [98.9 °F (37.2 °C)-103.4 °F (39.7 °C)] 99 °F (37.2 °C)  Pulse:  [67-81] 67  Resp:  [16-20] 16  SpO2:  [96 %-99 %] 97 %  BP: (109-148)/(49-76) 109/56     Weight: 90.4 kg (199 lb 4.7 oz)  Body mass index is 30.3 kg/m².    Estimated Creatinine Clearance: 14.6 mL/min (A) (based on SCr of 5.2 mg/dL (H)).     Physical Exam  Vitals and nursing note reviewed.   Constitutional:       General: He is not in acute distress.     Appearance: Normal appearance.   HENT:      Head: Normocephalic.      Mouth/Throat:      Mouth: Mucous membranes are moist.      Pharynx: Oropharynx is clear.   Cardiovascular:      Comments: LUE AV fistula, RUE PIV  Pulmonary:      Effort: Pulmonary effort is normal. No respiratory distress.   Abdominal:      General: There is no distension.      Palpations: Abdomen is soft.      Tenderness: There is no abdominal tenderness.   Musculoskeletal:      Comments: RLE wrapped with wound vac   Skin:     General: Skin is warm and dry.          Significant Labs:   Microbiology Results (last 7 days)       Procedure Component Value Units Date/Time    Culture, Anaerobe [[id number]] Collected: 04/04/24 1110    Order Status: Completed Specimen: Incision site from Ankle, Right Updated: 04/08/24 0901     Anaerobic Culture No anaerobes isolated    Narrative:      Right Extensor Retinaculum    Culture, Anaerobe [[id number]] Collected: 04/04/24 1115    Order Status: Completed Specimen: Wound from Ankle, Right Updated: 04/08/24 0900     Anaerobic Culture No anaerobes isolated    Narrative:      Right ankle wound            Significant Imaging: I have reviewed all pertinent  imaging results/findings within the past 24 hours.

## 2024-04-13 NOTE — PROGRESS NOTES
Date of Admission:3/20/2024    SUBJECTIVE:notes hd went ok yesterday  Sleeping comfortably  Current Facility-Administered Medications   Medication Dose Route Frequency Provider Last Rate Last Admin    0.9%  NaCl infusion (for blood administration)   Intravenous Q24H PRN Jimbo Montilla III, MD        0.9%  NaCl infusion   Intravenous Once Jimbo Montilla III, MD        acetaminophen tablet 650 mg  650 mg Oral Q6H PRN Jimbo Montilla III, MD        amiodarone tablet 400 mg  400 mg Oral BID Jimbo Montilla III, MD   400 mg at 04/12/24 2059    atorvastatin tablet 80 mg  80 mg Oral Daily Jimob Montilla III, MD   80 mg at 04/12/24 0834    cefTRIAXone (ROCEPHIN) 2 g in dextrose 5 % in water (D5W) 100 mL IVPB (MB+)  2 g Intravenous Q24H Melinda Casillas MD   Stopped at 04/12/24 1718    dextrose 10% bolus 125 mL 125 mL  12.5 g Intravenous PRN Jimbo Montilla III, MD        dextrose 10% bolus 250 mL 250 mL  25 g Intravenous PRN Jimbo Montilla III, MD        diphenoxylate-atropine 2.5-0.025 mg per tablet 1 tablet  1 tablet Oral Q6H PRN Jimbo Montilla III, MD   1 tablet at 04/02/24 2334    [START ON 4/15/2024] epoetin marisol-epbx injection 10,000 Units  10,000 Units Subcutaneous Every Mon, Wed, Fri Karen Tamez MD        ferrous gluconate tablet 324 mg  324 mg Oral BID  Karen Tamez MD        finasteride tablet 5 mg  5 mg Oral Daily Jimbo Montilla III, MD   5 mg at 04/12/24 0834    glucagon (human recombinant) injection 1 mg  1 mg Intramuscular PRN Jimbo Montilla III, MD        glucose chewable tablet 16 g  16 g Oral PRN Jimbo Montilla III, MD        glucose chewable tablet 24 g  24 g Oral PRN Jimbo Montilla III, MD        heparin (porcine) injection 5,000 Units  5,000 Units Subcutaneous Q12H Jimbo Montilla III, MD   5,000 Units at 04/12/24 2059    HYDROmorphone injection 0.5 mg  0.5 mg Intravenous Q4H PRN Jimbo Montilla III, MD   0.5 mg at 04/12/24 3687    insulin aspart U-100 pen 0-5  Units  0-5 Units Subcutaneous QID (AC + HS) PRN Jimbo Montilla III, MD   1 Units at 04/12/24 2152    melatonin tablet 6 mg  6 mg Oral Nightly PRN Jimbo Montilla III, MD   6 mg at 04/08/24 2046    naloxone 0.4 mg/mL injection 0.02 mg  0.02 mg Intravenous PRN Jimbo Montilla III, MD        ondansetron injection 4 mg  4 mg Intravenous Q6H PRN Jimbo Montilla III, MD   4 mg at 04/13/24 0337    oxyCODONE-acetaminophen  mg per tablet 1 tablet  1 tablet Oral Q4H PRN Jimbo Montilla III, MD   1 tablet at 04/12/24 1948    prochlorperazine injection Soln 5 mg  5 mg Intravenous Q6H PRN Jimbo Montilla III, MD        sodium chloride 0.9% bolus 250 mL 250 mL  250 mL Intravenous PRN Jimbo Montilla III, MD        sodium chloride 0.9% bolus 250 mL 250 mL  250 mL Intravenous PRN Jimbo Montilla III, MD        tamsulosin 24 hr capsule 0.8 mg  2 capsule Oral Daily Jimbo Montilla III, MD   0.8 mg at 04/12/24 0835    vancomycin - pharmacy to dose   Intravenous pharmacy to manage frequency Melinda Casillas MD        vitamin renal formula (B-complex-vitamin c-folic acid) 1 mg per capsule 1 capsule  1 capsule Oral Daily iJmbo Montilla III, MD   1 capsule at 04/12/24 0834       Wt Readings from Last 3 Encounters:   04/02/24 90.4 kg (199 lb 4.7 oz)   03/07/24 79.8 kg (175 lb 14.8 oz)   02/27/24 79.8 kg (175 lb 14.8 oz)     Temp Readings from Last 3 Encounters:   04/13/24 99.9 °F (37.7 °C) (Oral)   02/06/24 98 °F (36.7 °C) (Oral)   02/06/24 98.1 °F (36.7 °C) (Oral)     BP Readings from Last 3 Encounters:   04/13/24 137/63   03/07/24 129/65   02/06/24 110/72     Pulse Readings from Last 3 Encounters:   04/13/24 68   02/06/24 80   02/06/24 80       Intake/Output Summary (Last 24 hours) at 4/13/2024 0658  Last data filed at 4/12/2024 1849  Gross per 24 hour   Intake 980 ml   Output 3000 ml   Net -2020 ml       PE:  GEN:wd male in nad  HEENT:ncat,eomi,mm  CVS:irregular  PULM:ctab  ABD:bs,soft  EXT: dressed leg,  avf arm left  NEURO:awake,alert    Recent Labs   Lab 04/13/24  0524   *   *   K 4.3   CL 98   CO2 26   BUN 29*   CREATININE 5.2*   CALCIUM 8.0*       Lab Results   Component Value Date    .0 (H) 01/08/2021    CALCIUM 8.0 (L) 04/13/2024    PHOS 3.5 03/29/2024       Recent Labs   Lab 04/13/24 0524   WBC 15.25*   RBC 2.71*   HGB 7.0*   HCT 22.3*      MCV 82   MCH 25.8*   MCHC 31.4*           A/P:  1.esrd. cont hd MWF. HD yesterday.  2.htn. sbp ok. Cont tx.  3.paf. hr ok. Following.  4.cellulitis/abscess. s/p id. Cont tx. Ecoli. Vanc? Fever still?  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 Cont epo. Add oral iron.  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 Phos at goal.  7.severe maln add supplement.

## 2024-04-13 NOTE — SUBJECTIVE & OBJECTIVE
Interval History:  Patient was seen today for follow-up care.  Patient reports no chest pain or shortness a breath.  Patient was antibiotics adjusted by Infectious Disease due to concerns of persistent leukocytosis despite right ankle cellulitis/abscess washout x3.  Patient antibiotics were recommended to be changed to IV Rocephin and vancomycin, for total of 6 weeks to cover for possible osteomyelitis changes seen intraoperatively.  Patient receiving wound care, has wound VAC in place.  Patient followed by Nephrology for ESRD.  Patient on Epogen and iron supplement for his anemia.              Review of Systems   Constitutional: Negative.    HENT: Negative.     Eyes: Negative.    Respiratory: Negative.     Cardiovascular: Negative.    Gastrointestinal: Negative.    Endocrine: Negative.    Genitourinary: Negative.    Musculoskeletal: Negative.    Skin: Negative.    Allergic/Immunologic: Negative.    Neurological: Negative.    Hematological: Negative.    Psychiatric/Behavioral: Negative.       Objective:     Vital Signs (Most Recent):  Temp: 99 °F (37.2 °C) (04/13/24 0832)  Pulse: 81 (04/13/24 0832)  Resp: 16 (04/13/24 0832)  BP: (!) 109/56 (04/13/24 0832)  SpO2: 97 % (04/13/24 0832) Vital Signs (24h Range):  Temp:  [98 °F (36.7 °C)-103.4 °F (39.7 °C)] 99 °F (37.2 °C)  Pulse:  [67-81] 81  Resp:  [16-20] 16  SpO2:  [96 %-99 %] 97 %  BP: (108-148)/(49-76) 109/56     Weight: 90.4 kg (199 lb 4.7 oz)  Body mass index is 30.3 kg/m².    Intake/Output Summary (Last 24 hours) at 4/13/2024 0917  Last data filed at 4/12/2024 1849  Gross per 24 hour   Intake 860 ml   Output 3000 ml   Net -2140 ml         Physical Exam  Vitals reviewed.   Constitutional:       Appearance: Normal appearance. He is normal weight.   HENT:      Head: Normocephalic.      Right Ear: External ear normal.      Left Ear: External ear normal.      Nose: Nose normal.      Mouth/Throat:      Mouth: Mucous membranes are moist.      Pharynx: Oropharynx is  "clear.   Eyes:      Extraocular Movements: Extraocular movements intact.      Conjunctiva/sclera: Conjunctivae normal.      Pupils: Pupils are equal, round, and reactive to light.   Cardiovascular:      Rate and Rhythm: Normal rate and regular rhythm.      Pulses: Normal pulses.      Heart sounds: Normal heart sounds.   Pulmonary:      Effort: Pulmonary effort is normal.      Breath sounds: Normal breath sounds.   Abdominal:      General: Abdomen is flat. Bowel sounds are normal.      Palpations: Abdomen is soft.   Musculoskeletal:         General: Normal range of motion.      Cervical back: Normal range of motion and neck supple.      Comments: S/P I&D of right ankle, with wound VAC in place.   Skin:     General: Skin is warm.      Capillary Refill: Capillary refill takes less than 2 seconds.   Neurological:      General: No focal deficit present.      Mental Status: He is alert and oriented to person, place, and time. Mental status is at baseline.      Cranial Nerves: No cranial nerve deficit.   Psychiatric:         Mood and Affect: Mood normal.         Behavior: Behavior normal.         Thought Content: Thought content normal.         Judgment: Judgment normal.             Significant Labs: All pertinent labs within the past 24 hours have been reviewed.  A1C:   Recent Labs   Lab 10/26/23  1439 03/20/24  1724   HGBA1C 5.9* 5.8*     Blood Culture: No results for input(s): "LABBLOO" in the last 48 hours.  CBC:   Recent Labs   Lab 04/12/24  0412 04/13/24  0524   WBC 15.58* 15.25*   HGB 7.3* 7.0*   HCT 23.2* 22.3*    373     CMP:   Recent Labs   Lab 04/12/24  0412 04/13/24  0524    133*   K 4.4 4.3    98   CO2 21* 26   * 165*   BUN 38* 29*   CREATININE 6.6* 5.2*   CALCIUM 7.9* 8.0*   PROT 5.0* 5.2*   ALBUMIN 1.2* 1.3*   BILITOT 0.6 0.5   ALKPHOS 81 81   * 134*   ALT 59* 72*   ANIONGAP 14 9         Significant Imaging: I have reviewed all pertinent imaging results/findings within the " past 24 hours.

## 2024-04-13 NOTE — PLAN OF CARE
[place] faxed wound fax completed form to [facility] Wound vac for home use .  SW/CM to follow up on Monday.  Daughter, Maddy says that the hospital bed arrived at the house today.

## 2024-04-13 NOTE — ASSESSMENT & PLAN NOTE
Miguel Moore is a 68 year old man with ESRD on HD, DM and CVA with R sided weakness admitted on 3/21 for fatigue, hyperkalemia, found to have Afib with RVR. CT ankle from 3/28 suggestive of cellulitis, no evidence of osteomyelitis. He was on vancomycin from 3/20-3/29 and then 4/3-4/5. As well as ceftriaxone and then meropenem from 3/20-3/29. Leg started draining and found to have abscess. Went for I&D 4/4 and 4/6. Surgical cultures from 4/4 growing E. Coli. Operative noted on 4/6 notable for necrotic tissue around distal fibula. His fevers have now resolved but his WBC has remained around 15 since his I&D. He does have a history of MRSA infection of that R ankle in 2021. Given his persistent leukocytosis and progression of necrotic tissue to near his distal fibula would add MRSA coverage with vancomycin and treat for osteomyelitis with 6 week course.     Recommendations  - continue ceftriaxone 2 grams q24 hours given after HD on HD days (at discharge can convert to three times weekly dosing)  - continue vancomycin goal trough 15-20 mcg/ml, dosing per pharmacy   - blood cultures ordered given his fever

## 2024-04-13 NOTE — NURSING
[facility], [facility]  Nurses Note -- 4 Eyes      4/12/2024       Skin assessed on: Q Shift      [] No Pressure Injuries Present    []Prevention Measures Documented    [x] Yes LDA  for Pressure Injury Previously documented     [] Yes New Pressure Injury Discovered   [] LDA for New Pressure Injury Added      Attending RN:  Jennifer Velasquez RN     Second RN:  DARRION Domingo

## 2024-04-13 NOTE — ASSESSMENT & PLAN NOTE
-continue wound care and wound VAC management.  -patient on IV antibiotics cefepime 1 g Q 24 hours  -wound culture grew E coli.  -patient was followed by ID and Orthopedics, input appreciated.  -antibiotics adjusted by Infectious Disease, input appreciated.  Patient was started on IV Rocephin 2 g IVPB daily and renal dose vancomycin to cover for possible osteomyelitis changes seen intraoperatively.  Patient will need total of 6 weeks of IV antibiotics.  -trend WBC, down to 15.2

## 2024-04-13 NOTE — ASSESSMENT & PLAN NOTE
Patient's anemia is currently controlled. Has not received any PRBCs to date.   Lab Results   Component Value Date    HGB 7.0 (L) 04/13/2024    HCT 22.3 (L) 04/13/2024   Anemia of CKD plus anemia of acute blood loss as expected from surgical procedures.  Transfused 2 units of blood with adequate correction.  -patient receiving Epogen and iron supplement.  Continue to monitor

## 2024-04-14 LAB
ABO + RH BLD: NORMAL
ALBUMIN SERPL BCP-MCNC: 1.3 G/DL (ref 3.5–5.2)
ALP SERPL-CCNC: 74 U/L (ref 55–135)
ALT SERPL W/O P-5'-P-CCNC: 86 U/L (ref 10–44)
ANION GAP SERPL CALC-SCNC: 16 MMOL/L (ref 8–16)
AST SERPL-CCNC: 154 U/L (ref 10–40)
BASOPHILS # BLD AUTO: 0.06 K/UL (ref 0–0.2)
BASOPHILS NFR BLD: 0.4 % (ref 0–1.9)
BILIRUB SERPL-MCNC: 0.4 MG/DL (ref 0.1–1)
BLD GP AB SCN CELLS X3 SERPL QL: NORMAL
BLD PROD TYP BPU: NORMAL
BLOOD UNIT EXPIRATION DATE: NORMAL
BLOOD UNIT TYPE CODE: 6200
BLOOD UNIT TYPE: NORMAL
BUN SERPL-MCNC: 42 MG/DL (ref 8–23)
CALCIUM SERPL-MCNC: 7.4 MG/DL (ref 8.7–10.5)
CHLORIDE SERPL-SCNC: 97 MMOL/L (ref 95–110)
CO2 SERPL-SCNC: 24 MMOL/L (ref 23–29)
CODING SYSTEM: NORMAL
CREAT SERPL-MCNC: 6.8 MG/DL (ref 0.5–1.4)
CROSSMATCH INTERPRETATION: NORMAL
DIFFERENTIAL METHOD BLD: ABNORMAL
DISPENSE STATUS: NORMAL
EOSINOPHIL # BLD AUTO: 0.1 K/UL (ref 0–0.5)
EOSINOPHIL NFR BLD: 1 % (ref 0–8)
ERYTHROCYTE [DISTWIDTH] IN BLOOD BY AUTOMATED COUNT: 18.6 % (ref 11.5–14.5)
EST. GFR  (NO RACE VARIABLE): 8 ML/MIN/1.73 M^2
GLUCOSE SERPL-MCNC: 135 MG/DL (ref 70–110)
HCT VFR BLD AUTO: 21.3 % (ref 40–54)
HGB BLD-MCNC: 6.7 G/DL (ref 14–18)
IMM GRANULOCYTES # BLD AUTO: 0.2 K/UL (ref 0–0.04)
IMM GRANULOCYTES NFR BLD AUTO: 1.4 % (ref 0–0.5)
LYMPHOCYTES # BLD AUTO: 0.9 K/UL (ref 1–4.8)
LYMPHOCYTES NFR BLD: 6.7 % (ref 18–48)
MCH RBC QN AUTO: 25.6 PG (ref 27–31)
MCHC RBC AUTO-ENTMCNC: 31.5 G/DL (ref 32–36)
MCV RBC AUTO: 81 FL (ref 82–98)
MONOCYTES # BLD AUTO: 0.7 K/UL (ref 0.3–1)
MONOCYTES NFR BLD: 5.2 % (ref 4–15)
NEUTROPHILS # BLD AUTO: 12 K/UL (ref 1.8–7.7)
NEUTROPHILS NFR BLD: 85.3 % (ref 38–73)
NRBC BLD-RTO: 0 /100 WBC
PLATELET # BLD AUTO: 379 K/UL (ref 150–450)
PMV BLD AUTO: 9.4 FL (ref 9.2–12.9)
POCT GLUCOSE: 160 MG/DL (ref 70–110)
POCT GLUCOSE: 183 MG/DL (ref 70–110)
POCT GLUCOSE: 226 MG/DL (ref 70–110)
POCT GLUCOSE: 238 MG/DL (ref 70–110)
POTASSIUM SERPL-SCNC: 4.7 MMOL/L (ref 3.5–5.1)
PROT SERPL-MCNC: 4.4 G/DL (ref 6–8.4)
RBC # BLD AUTO: 2.62 M/UL (ref 4.6–6.2)
SODIUM SERPL-SCNC: 137 MMOL/L (ref 136–145)
SPECIMEN OUTDATE: NORMAL
TRANS ERYTHROCYTES VOL PATIENT: NORMAL ML
WBC # BLD AUTO: 14.08 K/UL (ref 3.9–12.7)

## 2024-04-14 PROCEDURE — 63600175 PHARM REV CODE 636 W HCPCS: Mod: HCNC | Performed by: STUDENT IN AN ORGANIZED HEALTH CARE EDUCATION/TRAINING PROGRAM

## 2024-04-14 PROCEDURE — 94761 N-INVAS EAR/PLS OXIMETRY MLT: CPT | Mod: HCNC

## 2024-04-14 PROCEDURE — 63600175 PHARM REV CODE 636 W HCPCS: Mod: HCNC | Performed by: ORTHOPAEDIC SURGERY

## 2024-04-14 PROCEDURE — 36415 COLL VENOUS BLD VENIPUNCTURE: CPT | Mod: HCNC | Performed by: ORTHOPAEDIC SURGERY

## 2024-04-14 PROCEDURE — 27000221 HC OXYGEN, UP TO 24 HOURS: Mod: HCNC

## 2024-04-14 PROCEDURE — P9021 RED BLOOD CELLS UNIT: HCPCS | Mod: HCNC | Performed by: INTERNAL MEDICINE

## 2024-04-14 PROCEDURE — 25000003 PHARM REV CODE 250: Mod: HCNC | Performed by: ORTHOPAEDIC SURGERY

## 2024-04-14 PROCEDURE — 21400001 HC TELEMETRY ROOM: Mod: HCNC

## 2024-04-14 PROCEDURE — 25000003 PHARM REV CODE 250: Mod: HCNC | Performed by: INTERNAL MEDICINE

## 2024-04-14 PROCEDURE — 80053 COMPREHEN METABOLIC PANEL: CPT | Mod: HCNC | Performed by: ORTHOPAEDIC SURGERY

## 2024-04-14 PROCEDURE — 86920 COMPATIBILITY TEST SPIN: CPT | Mod: HCNC | Performed by: INTERNAL MEDICINE

## 2024-04-14 PROCEDURE — 85025 COMPLETE CBC W/AUTO DIFF WBC: CPT | Mod: HCNC | Performed by: ORTHOPAEDIC SURGERY

## 2024-04-14 PROCEDURE — 36415 COLL VENOUS BLD VENIPUNCTURE: CPT | Mod: HCNC | Performed by: INTERNAL MEDICINE

## 2024-04-14 PROCEDURE — 86850 RBC ANTIBODY SCREEN: CPT | Mod: HCNC | Performed by: INTERNAL MEDICINE

## 2024-04-14 PROCEDURE — 25000003 PHARM REV CODE 250: Mod: HCNC | Performed by: STUDENT IN AN ORGANIZED HEALTH CARE EDUCATION/TRAINING PROGRAM

## 2024-04-14 PROCEDURE — 27201040 HC RC 50 FILTER: Mod: HCNC | Performed by: INTERNAL MEDICINE

## 2024-04-14 PROCEDURE — 99232 SBSQ HOSP IP/OBS MODERATE 35: CPT | Mod: HCNC,,, | Performed by: STUDENT IN AN ORGANIZED HEALTH CARE EDUCATION/TRAINING PROGRAM

## 2024-04-14 RX ORDER — HYDROCODONE BITARTRATE AND ACETAMINOPHEN 500; 5 MG/1; MG/1
TABLET ORAL
Status: DISCONTINUED | OUTPATIENT
Start: 2024-04-14 | End: 2024-04-19 | Stop reason: HOSPADM

## 2024-04-14 RX ADMIN — INSULIN ASPART 2 UNITS: 100 INJECTION, SOLUTION INTRAVENOUS; SUBCUTANEOUS at 04:04

## 2024-04-14 RX ADMIN — FINASTERIDE 5 MG: 5 TABLET, FILM COATED ORAL at 08:04

## 2024-04-14 RX ADMIN — AMIODARONE HYDROCHLORIDE 400 MG: 200 TABLET ORAL at 08:04

## 2024-04-14 RX ADMIN — OXYCODONE AND ACETAMINOPHEN 1 TABLET: 10; 325 TABLET ORAL at 06:04

## 2024-04-14 RX ADMIN — Medication 324 MG: at 08:04

## 2024-04-14 RX ADMIN — Medication 324 MG: at 04:04

## 2024-04-14 RX ADMIN — CEFTRIAXONE 2 G: 2 INJECTION, POWDER, FOR SOLUTION INTRAMUSCULAR; INTRAVENOUS at 06:04

## 2024-04-14 RX ADMIN — TAMSULOSIN HYDROCHLORIDE 0.8 MG: 0.4 CAPSULE ORAL at 08:04

## 2024-04-14 RX ADMIN — ATORVASTATIN CALCIUM 80 MG: 40 TABLET, FILM COATED ORAL at 08:04

## 2024-04-14 RX ADMIN — HEPARIN SODIUM 5000 UNITS: 5000 INJECTION INTRAVENOUS; SUBCUTANEOUS at 08:04

## 2024-04-14 RX ADMIN — NEPHROCAP 1 CAPSULE: 1 CAP ORAL at 08:04

## 2024-04-14 RX ADMIN — INSULIN ASPART 1 UNITS: 100 INJECTION, SOLUTION INTRAVENOUS; SUBCUTANEOUS at 08:04

## 2024-04-14 RX ADMIN — MELATONIN TAB 3 MG 6 MG: 3 TAB at 08:04

## 2024-04-14 NOTE — ASSESSMENT & PLAN NOTE
Patient's anemia is currently controlled. Has not received any PRBCs to date.   Lab Results   Component Value Date    HGB 6.7 (L) 04/14/2024    HCT 21.3 (L) 04/14/2024   Anemia of CKD plus anemia of acute blood loss as expected from surgical procedures.  Transfused 2 units of blood with adequate correction.  -patient receiving Epogen and iron supplement.  Continue to monitor, transfuse for hemoglobin less than 7  -hemoglobin down to 6.7, ordered 1 unit PRBC to be transfused.  Check post transfusion hemoglobin.

## 2024-04-14 NOTE — ASSESSMENT & PLAN NOTE
-continue wound care and wound VAC management.  -patient on IV antibiotics cefepime 1 g Q 24 hours  -wound culture grew E coli.  -patient was followed by ID and Orthopedics, input appreciated.  -antibiotics adjusted by Infectious Disease, input appreciated.  Patient was started on IV Rocephin 2 g IVPB daily and renal dose vancomycin to cover for possible osteomyelitis changes seen intraoperatively.  Patient will need total of 6 weeks of IV antibiotics.  -trend WBC, down to 15.2>14  -repeat blood culture, no growth

## 2024-04-14 NOTE — NURSING
Pt's hemoglobin is 6.7. Dipti YOUNG notified. No order yet.      0607: Let the primary doctor in AM know as ordered.

## 2024-04-14 NOTE — ASSESSMENT & PLAN NOTE
Miguel Moore is a 68 year old man with ESRD on HD, DM and CVA with R sided weakness admitted on 3/21 for fatigue, hyperkalemia, found to have Afib with RVR. CT ankle from 3/28 suggestive of cellulitis, no evidence of osteomyelitis. He was on vancomycin from 3/20-3/29 and then 4/3-4/5. As well as ceftriaxone and then meropenem from 3/20-3/29. Leg started draining and found to have abscess. Went for I&D 4/4 and 4/6. Surgical cultures from 4/4 growing E. Coli. Operative noted on 4/6 notable for necrotic tissue around distal fibula. His fevers have now resolved but his WBC has remained around 15 since his I&D. He does have a history of MRSA infection of that R ankle in 2021. Given his persistent leukocytosis, fever and progression of necrotic tissue to near his distal fibula added MRSA coverage with vancomycin and currently plan to treat for osteomyelitis with 6 week course.     Recommendations  - continue ceftriaxone 2 grams q24 hours given after HD on HD days  - continue vancomycin goal trough 15-20 mcg/ml, dosing per pharmacy   - blood cultures pending

## 2024-04-14 NOTE — SUBJECTIVE & OBJECTIVE
Interval History:  Patient was seen today for follow-up care.  Patient reports no chest pain or shortness a breath.  Patient's labs show hemoglobin down to 6.7, we will type and cross 1 unit PRBC.  Patient on IV Rocephin and vancomycin for total of 6 weeks for possible right ankle osteomyelitis changes.  WBC down to 14, was 15.  Patient was followed by Nephrology and Infectious Disease.               Review of Systems   Constitutional: Negative.    HENT: Negative.     Eyes: Negative.    Respiratory: Negative.     Cardiovascular: Negative.    Gastrointestinal: Negative.    Endocrine: Negative.    Genitourinary: Negative.    Musculoskeletal: Negative.    Skin: Negative.    Allergic/Immunologic: Negative.    Neurological: Negative.    Hematological: Negative.    Psychiatric/Behavioral: Negative.       Objective:     Vital Signs (Most Recent):  Temp: 98.6 °F (37 °C) (04/14/24 0403)  Pulse: 69 (04/14/24 0414)  Resp: 18 (04/14/24 0403)  BP: (!) 104/49 (04/14/24 0403)  SpO2: 98 % (04/14/24 0403) Vital Signs (24h Range):  Temp:  [98.6 °F (37 °C)-99.8 °F (37.7 °C)] 98.6 °F (37 °C)  Pulse:  [67-81] 69  Resp:  [16-18] 18  SpO2:  [92 %-100 %] 98 %  BP: ()/(48-62) 104/49     Weight: 90.4 kg (199 lb 4.7 oz)  Body mass index is 30.3 kg/m².    Intake/Output Summary (Last 24 hours) at 4/14/2024 0621  Last data filed at 4/13/2024 1842  Gross per 24 hour   Intake 600 ml   Output --   Net 600 ml         Physical Exam  Vitals reviewed.   Constitutional:       Appearance: Normal appearance. He is normal weight.   HENT:      Head: Normocephalic.      Right Ear: External ear normal.      Left Ear: External ear normal.      Nose: Nose normal.      Mouth/Throat:      Mouth: Mucous membranes are moist.      Pharynx: Oropharynx is clear.   Eyes:      Extraocular Movements: Extraocular movements intact.      Conjunctiva/sclera: Conjunctivae normal.      Pupils: Pupils are equal, round, and reactive to light.   Cardiovascular:      Rate  and Rhythm: Normal rate and regular rhythm.      Pulses: Normal pulses.      Heart sounds: Normal heart sounds.   Pulmonary:      Effort: Pulmonary effort is normal.      Breath sounds: Normal breath sounds.   Abdominal:      General: Abdomen is flat. Bowel sounds are normal.      Palpations: Abdomen is soft.   Musculoskeletal:         General: Normal range of motion.      Cervical back: Normal range of motion and neck supple.      Comments: S/P I&D of right ankle, with wound VAC in place.   Skin:     General: Skin is warm.      Capillary Refill: Capillary refill takes less than 2 seconds.   Neurological:      General: No focal deficit present.      Mental Status: He is alert and oriented to person, place, and time. Mental status is at baseline.      Cranial Nerves: No cranial nerve deficit.   Psychiatric:         Mood and Affect: Mood normal.         Behavior: Behavior normal.         Thought Content: Thought content normal.         Judgment: Judgment normal.             Significant Labs: All pertinent labs within the past 24 hours have been reviewed.  A1C:   Recent Labs   Lab 10/26/23  1439 03/20/24  1724   HGBA1C 5.9* 5.8*     Blood Culture:   Recent Labs   Lab 04/13/24  1237   LABBLOO No Growth to date     CBC:   Recent Labs   Lab 04/13/24  0524 04/14/24  0431   WBC 15.25* 14.08*   HGB 7.0* 6.7*   HCT 22.3* 21.3*    379     CMP:   Recent Labs   Lab 04/13/24  0524   *   K 4.3   CL 98   CO2 26   *   BUN 29*   CREATININE 5.2*   CALCIUM 8.0*   PROT 5.2*   ALBUMIN 1.3*   BILITOT 0.5   ALKPHOS 81   *   ALT 72*   ANIONGAP 9         Significant Imaging: I have reviewed all pertinent imaging results/findings within the past 24 hours.

## 2024-04-14 NOTE — PLAN OF CARE
Problem: Adult Inpatient Plan of Care  Goal: Plan of Care Review  Outcome: Ongoing, Progressing  Flowsheets (Taken 4/14/2024 0531)  Plan of Care Reviewed With: patient  Goal: Optimal Comfort and Wellbeing  Outcome: Ongoing, Progressing  Intervention: Monitor Pain and Promote Comfort  Flowsheets (Taken 4/14/2024 0531)  Pain Management Interventions:   pillow support provided   quiet environment facilitated   position adjusted     Problem: Diabetes Comorbidity  Goal: Blood Glucose Level Within Targeted Range  Outcome: Ongoing, Progressing  Intervention: Monitor and Manage Glycemia  Flowsheets (Taken 4/14/2024 0531)  Glycemic Management: blood glucose monitored

## 2024-04-14 NOTE — PROGRESS NOTES
The patient is in bed.  He reports he is doing okay.      He has been seen by HUGO MORENO per nurse    Temp:  [98.7 °F (37.1 °C)-100.2 °F (37.9 °C)] 99.7 °F (37.6 °C)  Pulse:  [67-81] 70  Resp:  [16-19] 18  SpO2:  [92 %-100 %] 96 %  BP: ()/(48-63) 98/62    Physical examination:    AAO fully      Right leg wound VAC is in place.  It is functioning appropriately.      Recent Labs   Lab 04/13/24  0524   WBC 15.25*   RBC 2.71*   HGB 7.0*   HCT 22.3*      MCV 82   MCH 25.8*   MCHC 31.4*         Current Facility-Administered Medications:     0.9%  NaCl infusion (for blood administration), , Intravenous, Q24H PRN, Jimbo Montilla III, MD    0.9%  NaCl infusion, , Intravenous, Once, Jimbo Montilla III, MD    acetaminophen tablet 650 mg, 650 mg, Oral, Q6H PRN, Jimbo Montilla III, MD    amiodarone tablet 400 mg, 400 mg, Oral, BID, Jimbo Montilla III, MD, 400 mg at 04/13/24 2033    atorvastatin tablet 80 mg, 80 mg, Oral, Daily, Jimbo Montilla III, MD, 80 mg at 04/13/24 0805    cefTRIAXone (ROCEPHIN) 2 g in dextrose 5 % in water (D5W) 100 mL IVPB (MB+), 2 g, Intravenous, Q24H, Melinda Casillas MD, Stopped at 04/13/24 1635    dextrose 10% bolus 125 mL 125 mL, 12.5 g, Intravenous, PRN, Jimbo Montilla III, MD    dextrose 10% bolus 250 mL 250 mL, 25 g, Intravenous, PRN, Jimbo Montilla III, MD    diphenoxylate-atropine 2.5-0.025 mg per tablet 1 tablet, 1 tablet, Oral, Q6H PRN, Jimbo Montilla III, MD, 1 tablet at 04/02/24 2334    [START ON 4/15/2024] epoetin marisol-epbx injection 10,000 Units, 10,000 Units, Subcutaneous, Every Mon, Wed, Fri, Karen Tamez MD    ferrous gluconate tablet 324 mg, 324 mg, Oral, BID WM, Karen Tamez MD, 324 mg at 04/13/24 1620    finasteride tablet 5 mg, 5 mg, Oral, Daily, Jimbo Montilla III, MD, 5 mg at 04/13/24 0806    glucagon (human recombinant) injection 1 mg, 1 mg, Intramuscular, PRN, Jimbo Montilla III, MD    glucose chewable tablet 16 g, 16 g, Oral, PRN,  Jimbo Montilla III, MD    glucose chewable tablet 24 g, 24 g, Oral, PRN, Jimbo Montilla III, MD    heparin (porcine) injection 5,000 Units, 5,000 Units, Subcutaneous, Q12H, Jimbo Montilla III, MD, 5,000 Units at 04/13/24 2033    HYDROmorphone injection 0.5 mg, 0.5 mg, Intravenous, Q4H PRN, Jimbo Montilla III, MD, 0.5 mg at 04/12/24 2152    insulin aspart U-100 pen 0-5 Units, 0-5 Units, Subcutaneous, QID (AC + HS) PRN, Jimbo Montilla III, MD, 1 Units at 04/12/24 2152    melatonin tablet 6 mg, 6 mg, Oral, Nightly PRN, Jimbo Montilla III, MD, 6 mg at 04/13/24 2033    naloxone 0.4 mg/mL injection 0.02 mg, 0.02 mg, Intravenous, PRN, Jimbo Montilla III, MD    ondansetron injection 4 mg, 4 mg, Intravenous, Q6H PRN, Jimbo Montilla III, MD, 4 mg at 04/13/24 0337    oxyCODONE-acetaminophen  mg per tablet 1 tablet, 1 tablet, Oral, Q4H PRN, Jimbo Montilla III, MD, 1 tablet at 04/12/24 1948    prochlorperazine injection Soln 5 mg, 5 mg, Intravenous, Q6H PRN, Jimbo Montilla III, MD, 5 mg at 04/13/24 0806    sodium chloride 0.9% bolus 250 mL 250 mL, 250 mL, Intravenous, PRN, Jimbo Montilla III, MD    sodium chloride 0.9% bolus 250 mL 250 mL, 250 mL, Intravenous, PRN, Jimbo Montilla III, MD    tamsulosin 24 hr capsule 0.8 mg, 2 capsule, Oral, Daily, Jimbo Montilla III, MD, 0.8 mg at 04/13/24 0806    Pharmacy to dose Vancomycin consult, , , Once **AND** vancomycin - pharmacy to dose, , Intravenous, pharmacy to manage frequency, Melinda Casillas MD    vitamin renal formula (B-complex-vitamin c-folic acid) 1 mg per capsule 1 capsule, 1 capsule, Oral, Daily, Jimbo Montilla III, MD, 1 capsule at 04/13/24 0806    Assessment and Plan:    68-year-old male with history of end-stage renal disease on dialysis presents with right leg infection. He underwent I&D of his right leg abscess on 04/04 and repeat I and D and replacement of wound VAC on 4/6 and 4/9.    ID broadening coverage      Leukocytosis stable at 15.      Wound VAC dressing change with wound care prior to discharge and twice a week.     Will follow    Irwin Garcia MD  [facility]

## 2024-04-14 NOTE — PROGRESS NOTES
[facility] Surg  Infectious Disease  Progress Note    Patient Name: Miguel Moore  MRN: [id number]  Admission Date: 3/20/2024  Length of Stay: 25 days  Attending Physician: Hipolito Mooney MD  Primary Care Provider: Raciel Raymond MD    Isolation Status: No active isolations  Assessment/Plan:      ID  * Cellulitis/Abscess  of right foot/ankle  Miguel Moore is a 68 year old man with ESRD on HD, DM and CVA with R sided weakness admitted on 3/21 for fatigue, hyperkalemia, found to have Afib with RVR. CT ankle from 3/28 suggestive of cellulitis, no evidence of osteomyelitis. He was on vancomycin from 3/20-3/29 and then 4/3-4/5. As well as ceftriaxone and then meropenem from 3/20-3/29. Leg started draining and found to have abscess. Went for I&D 4/4 and 4/6. Surgical cultures from 4/4 growing E. Coli. Operative noted on 4/6 notable for necrotic tissue around distal fibula. His fevers have now resolved but his WBC has remained around 15 since his I&D. He does have a history of MRSA infection of that R ankle in 2021. Given his persistent leukocytosis, fever and progression of necrotic tissue to near his distal fibula added MRSA coverage with vancomycin and currently plan to treat for osteomyelitis with 6 week course.   - continue ceftriaxone 2 grams q24 hours given after HD on HD days  - continue vancomycin goal trough 15-20 mcg/ml, dosing per pharmacy   - blood cultures pending     GI  Elevated liver enzymes  Could be due to cephalosporin use, but would evaluate for other causes.       Above discussed with primary team.     Time: 35 minutes   50% of time spent on face-to-face counseling and coordination of care. Counseling included review of test results, diagnosis, and treatment plan with patient and/or family.  I have reviewed hospital notes from HM service and other specialty providers. I have also reviewed CBC, CMP/BMP,  cultures and imaging with my interpretation as documented.     Anticipated Disposition:  [facility]    Thank you for your consult. I will follow-up with patient. Please contact us if you have any additional questions.    Melinda Casillas MD  Infectious Disease  [facility] [place] [facility]    Subjective:     Principal Problem:Cellulitis of right foot    HPI: 67 yo male with ESRD on HD, DM and CVA with R sided weakness admitted for fatigue, hyperkalemia, found to have Afib with RVR. ID consulted for fevers. Admission course notable for R ankle cellulitis, CT with extensive subcutaneous edema, no focal fluid collection or OM seen. He spiked a fever on 3/27 and 3/28 - blood cx obtained ngtd. TTE on 3/20 without IE. Pt is currently on vancomycin and meropenem. Prior to admission, pt reported he had pain in R ankle, denied fevers chills, sick contacts. He states that since he's been here, he has had some loose stools and RUE swelling. Reported hives with PCN in ithe past, tolerated carb/cephs.   Interval History: feeling okay, no new symptoms.     Review of Systems   Constitutional:  Negative for appetite change, fatigue and fever.   Respiratory: Negative.  Negative for cough.    Gastrointestinal: Negative.    Musculoskeletal: Negative.      Objective:     Vital Signs (Most Recent):  Temp: 98.3 °F (36.8 °C) (04/14/24 1213)  Pulse: 67 (04/14/24 1213)  Resp: 20 (04/14/24 1213)  BP: (!) 115/57 (04/14/24 1213)  SpO2: 98 % (04/14/24 0403) Vital Signs (24h Range):  Temp:  [97.6 °F (36.4 °C)-99.8 °F (37.7 °C)] 98.3 °F (36.8 °C)  Pulse:  [64-78] 67  Resp:  [16-20] 20  SpO2:  [92 %-100 %] 98 %  BP: ()/(48-62) 115/57     Weight: 90.4 kg (199 lb 4.7 oz)  Body mass index is 30.3 kg/m².    Estimated Creatinine Clearance: 11.2 mL/min (A) (based on SCr of 6.8 mg/dL (H)).     Physical Exam  Vitals and nursing note reviewed.   Constitutional:       Appearance: He is ill-appearing. He is not toxic-appearing.   HENT:      Head: Normocephalic.      Mouth/Throat:      Mouth: Mucous membranes are moist.      Pharynx: No oropharyngeal  exudate.   Abdominal:      General: There is no distension.      Palpations: Abdomen is soft.      Tenderness: There is no abdominal tenderness.   Musculoskeletal:      Comments: Right ankle wrapped with wound vac   Skin:     General: Skin is warm and dry.   Neurological:      Mental Status: He is alert.          Significant Labs:   Microbiology Results (last 7 days)       Procedure Component Value Units Date/Time    Blood culture [[id number]] Collected: 04/13/24 1237    Order Status: Completed Specimen: Blood Updated: 04/13/24 2112     Blood Culture, Routine No Growth to date    Culture, Anaerobe [[id number]] Collected: 04/04/24 1110    Order Status: Completed Specimen: Incision site from Ankle, Right Updated: 04/08/24 0901     Anaerobic Culture No anaerobes isolated    Narrative:      Right Extensor Retinaculum    Culture, Anaerobe [[id number]] Collected: 04/04/24 1115    Order Status: Completed Specimen: Wound from Ankle, Right Updated: 04/08/24 0900     Anaerobic Culture No anaerobes isolated    Narrative:      Right ankle wound            Significant Imaging: I have reviewed all pertinent imaging results/findings within the past 24 hours.

## 2024-04-14 NOTE — NURSING
[facility] [place] [place] -- 4 [place]      4/13/2024       Skin assessed on: Q Shift      [] No Pressure Injuries Present    [x]Prevention Measures Documented    [x] Yes LDA  for Pressure Injury Previously documented     [] Yes New Pressure Injury Discovered   [] LDA for New Pressure Injury Added      Attending RN:  Aman Feliz RN     Second RN:  DARRION Domingo

## 2024-04-14 NOTE — PROGRESS NOTES
[facility] Medicine  Progress Note    Patient Name: Miguel Moore  MRN: [id number]  Patient Class: IP- Inpatient   Admission Date: 3/20/2024  Length of Stay: 25 days  Attending Physician: Hipolito Mooney MD  Primary Care Provider: Raciel Raymond MD        Subjective:     Principal Problem:Cellulitis of right foot        HPI:  Mr Miguel Moore is a 68 y.o. man with ESRD on HD, HTN, DM with neuropathy, history of CVA with residual R sided weakness, chronic DVT on eliquis who presented with feeling poorly. He attended his usual dialysis session on Monday 3/18 without issues. He developed fatigue and R ankle swelling. He has some pain with palpation but is able to walk. No reports of trauma. No wounds. No falls. No history of gout. Today he was feeling worse so did not go to dialysis and came to the ED. No fevers. No shortness of breath. Normal appetite. No nausea, vomiting. No chest pain. No palpitations.     In the ED, afebrile with HR initially controlled then to 130s Afib RVR. Started diltiazem but became hypotensive. Diltiazem stopped. Given antibiotics. Admitted for further management.     Overview/Hospital Course:  Mr Miguel Moore is a 68 y.o. man with ESRD on HD, DM who was admitted with new onset Afib RVR, hyperkalemia from missed HD session, and R ankle cellulitis. Started amio gtt with conversion to NSR. TTE EF 55-60%, mild LAE. Cardiology consulted. Now on PO amiodarone and continues home eliquis (on this for chronic DVT). He received HD with resolution of hyperkalemia. He is on CTX for his R ankle cellulitis and was given colchicine in case gout could contribute. He developed recurrent Afib and amio gtt resumed. Cardiology was planning SONY/DCCV on 3/22. But converted to sinus. Transferred to floor 3/26.   Right ankle cellulitis improving however spiking fevers on 3/27 up to 102.7 F.  Vanc/Ceftriaxone changed to Meropenem. CT right ankle with no obvious acute infection, pain is  improving. Right forearm with swelling, u/s negative for abscess or hematoma. Blood cultures negative. Possible medication related fevers and ABx's stopped. 4/4-Right ankle wound then had new abscess/discharge sp I& D with wound vac placement by Orthopedic Surgery.  Anemia of CKD plus anemia of acute blood loss as expected from surgical procedure and patient transfused blood.  Repeat OR I&D and wound vac back on. Operative wound culture grew e coli. ID following for ABX recommendations.  Patient was scheduled for repeat washout, and wound VAC change today.  Patient followed by ID and ortho, input appreciated.  Patient improving postoperatively.  Possible discharge home in the next few days on Friday Orthopedics agrees.  Patient will need home health care with wound care and wound VAC to be set up.  Patient declines SNF.  Patient seen by infectious disease re-consultation, input appreciated.  Patient was concerns for possible osteomyelitis, recommended adjusting antibiotics to Rocephin 2 g IV q.day and renal dose vancomycin, he will need antibiotics for up to 6 weeks.  Patient's hemoglobin down to 6.7, patient on Epogen and ferrous gluconate.      Interval History:  Patient was seen today for follow-up care.  Patient reports no chest pain or shortness a breath.  Patient's labs show hemoglobin down to 6.7, we will type and cross 1 unit PRBC.  Patient on IV Rocephin and vancomycin for total of 6 weeks for possible right ankle osteomyelitis changes.  WBC down to 14, was 15.  Patient was followed by Nephrology and Infectious Disease.               Review of Systems   Constitutional: Negative.    HENT: Negative.     Eyes: Negative.    Respiratory: Negative.     Cardiovascular: Negative.    Gastrointestinal: Negative.    Endocrine: Negative.    Genitourinary: Negative.    Musculoskeletal: Negative.    Skin: Negative.    Allergic/Immunologic: Negative.    Neurological: Negative.    Hematological: Negative.     Psychiatric/Behavioral: Negative.       Objective:     Vital Signs (Most Recent):  Temp: 98.6 °F (37 °C) (04/14/24 0403)  Pulse: 69 (04/14/24 0414)  Resp: 18 (04/14/24 0403)  BP: (!) 104/49 (04/14/24 0403)  SpO2: 98 % (04/14/24 0403) Vital Signs (24h Range):  Temp:  [98.6 °F (37 °C)-99.8 °F (37.7 °C)] 98.6 °F (37 °C)  Pulse:  [67-81] 69  Resp:  [16-18] 18  SpO2:  [92 %-100 %] 98 %  BP: ()/(48-62) 104/49     Weight: 90.4 kg (199 lb 4.7 oz)  Body mass index is 30.3 kg/m².    Intake/Output Summary (Last 24 hours) at 4/14/2024 0621  Last data filed at 4/13/2024 1842  Gross per 24 hour   Intake 600 ml   Output --   Net 600 ml         Physical Exam  Vitals reviewed.   Constitutional:       Appearance: Normal appearance. He is normal weight.   HENT:      Head: Normocephalic.      Right Ear: External ear normal.      Left Ear: External ear normal.      Nose: Nose normal.      Mouth/Throat:      Mouth: Mucous membranes are moist.      Pharynx: Oropharynx is clear.   Eyes:      Extraocular Movements: Extraocular movements intact.      Conjunctiva/sclera: Conjunctivae normal.      Pupils: Pupils are equal, round, and reactive to light.   Cardiovascular:      Rate and Rhythm: Normal rate and regular rhythm.      Pulses: Normal pulses.      Heart sounds: Normal heart sounds.   Pulmonary:      Effort: Pulmonary effort is normal.      Breath sounds: Normal breath sounds.   Abdominal:      General: Abdomen is flat. Bowel sounds are normal.      Palpations: Abdomen is soft.   Musculoskeletal:         General: Normal range of motion.      Cervical back: Normal range of motion and neck supple.      Comments: S/P I&D of right ankle, with wound VAC in place.   Skin:     General: Skin is warm.      Capillary Refill: Capillary refill takes less than 2 seconds.   Neurological:      General: No focal deficit present.      Mental Status: He is alert and oriented to person, place, and time. Mental status is at baseline.      Cranial  Nerves: No cranial nerve deficit.   Psychiatric:         Mood and Affect: Mood normal.         Behavior: Behavior normal.         Thought Content: Thought content normal.         Judgment: Judgment normal.             Significant Labs: All pertinent labs within the past 24 hours have been reviewed.  A1C:   Recent Labs   Lab 10/26/23  1439 03/20/24  1724   HGBA1C 5.9* 5.8*     Blood Culture:   Recent Labs   Lab 04/13/24  1237   LABBLOO No Growth to date     CBC:   Recent Labs   Lab 04/13/24  0524 04/14/24  0431   WBC 15.25* 14.08*   HGB 7.0* 6.7*   HCT 22.3* 21.3*    379     CMP:   Recent Labs   Lab 04/13/24 0524   *   K 4.3   CL 98   CO2 26   *   BUN 29*   CREATININE 5.2*   CALCIUM 8.0*   PROT 5.2*   ALBUMIN 1.3*   BILITOT 0.5   ALKPHOS 81   *   ALT 72*   ANIONGAP 9         Significant Imaging: I have reviewed all pertinent imaging results/findings within the past 24 hours.    Assessment/Plan:      * Cellulitis/Abscess  of right foot/ankle  R ankle pain, swelling, warmth present. Potential gout vs cellulitis. 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 No fracture present. Uric acid normal. Arterial US and venous US with no clots, appropriate flow  - on ceftriaxone/vanc for 7 days and still with pain and now with increased WBC and fevers  - blood cultures currently NGTD  - CT right ankle with no evidence of osteo.  -4/4-Right ankle wound then had new abscess/discharge sp I& D with wound vac placement  -4/6-Repeat OR I&D and wound vac back on. OP wound culture grew e coli. Leukocytosis stable, afebrile.   -04/9 patient had a repeat washout with I&D.  -we will reconsult Infectious Disease due to patient was white blood count increasing despite antibiotics.  -continue daily wound care.  -id adjusted antibiotics, patient was recommended to start on IV Rocephin 2 g IVPB daily renal dose vancomycin to cover for positive osteomyelitis changes seen intraoperatively.   Patient will need total of 6 weeks of antibiotics.  -repeat blood cultures no growth  -trend WBC 15.5>15.2>14        Open wound  -continue wound care and wound VAC management.  -patient on IV antibiotics cefepime 1 g Q 24 hours  -wound culture grew E coli.  -patient was followed by ID and Orthopedics, input appreciated.  -antibiotics adjusted by Infectious Disease, input appreciated.  Patient was started on IV Rocephin 2 g IVPB daily and renal dose vancomycin to cover for possible osteomyelitis changes seen intraoperatively.  Patient will need total of 6 weeks of IV antibiotics.  -trend WBC, down to 15.2>14  -repeat blood culture, no growth        Paroxysmal atrial fibrillation with RVR  Patient has Paroxysmal (<7 days) atrial fibrillation which is uncontrolled. Patient is currently in atrial fibrillation.  - new onset Afib  - on labetalol for BP at home, eliquis for chronic DVT but did miss some doses of eliquis  - in ED given diltiazem gtt but that caused hypotension  - EKG with Afib RVR with normal Qtc  - started amiodarone with conversion to NSR. Changed to PO amiodarone  - however, he had recurrent Afib and was placed back on amio gtt.   - Cardiology consulted  - plan for SONY/DCCV on 3/22/24 but converted to sinus rhythm. Has been in/out of afib but rate controlled  - continue home eliquis  Hold Eliquis for I&D and possible future procedures.  Restart once done with procedures.    Anemia in chronic kidney disease  Patient's anemia is currently controlled. Has not received any PRBCs to date.   Lab Results   Component Value Date    HGB 6.7 (L) 04/14/2024    HCT 21.3 (L) 04/14/2024   Anemia of CKD plus anemia of acute blood loss as expected from surgical procedures.  Transfused 2 units of blood with adequate correction.  -patient receiving Epogen and iron supplement.  Continue to monitor, transfuse for hemoglobin less than 7  -hemoglobin down to 6.7, ordered 1 unit PRBC to be transfused.  Check post transfusion  hemoglobin.    Chronic deep vein thrombosis (DVT) of popliteal vein of right lower extremity 9/21/20 outside US; unprovoked; anticoagulation  Continue holding as patient may need repeat procedure.  Heparin for DVT prophylaxis.  -restart apixaban once procedures done.      History of stroke  PT/OT evaluation completed and recommended moderate intensity therapy  Case management will assist    Secondary hyperparathyroidism of renal origin  Continue renvela       ESRD (end stage renal disease)  ESRD via LUE AVF. Last session 3/18/24. Missed 3/20/24 due to fatigue. Usually MWF  - hyperkalemic on admit. Does not appear volume overloaded.   - Nephrology consulted.  - continue MWF HD    Hemiplegia and hemiparesis following cerebral infarction affecting right dominant side  No signs of acute stroke. Does have RUE weakness.   - continue home asa, statin  - resume apixaban once done with procedures.      Seizure disorder as sequela of cerebrovascular accident  Not currently on antiepileptics. No seizure activity reported. Monitor.       Controlled type 2 diabetes mellitus with chronic kidney disease on chronic dialysis, with long-term current use of insulin  A1c:   Lab Results   Component Value Date    HGBA1C 5.8 (H) 03/20/2024     Meds: SSI PRN to maintain goal 140-180  ADA renal diet, accuchecks, hypoglycemic protocol      Dyslipidemia  Continue statin      Benign essential HTN  Chronic. Latest blood pressure and vitals reviewed-     Temp:  [98 °F (36.7 °C)-103.4 °F (39.7 °C)]   Pulse:  [67-81]   Resp:  [16-20]   BP: (108-148)/(49-76)   SpO2:  [96 %-99 %] .   Home meds for hypertension were reviewed and noted below.   Hypertension Medications               amLODIPine (NORVASC) 10 MG tablet Take 1 tablet by mouth once daily    labetaloL (NORMODYNE) 100 MG tablet Take 1 tablet (100 mg total) by mouth once daily.          Continue current management.    Will utilize p.r.n. blood pressure medication only if patient's blood  pressure greater than 180/110 and he develops symptoms such as worsening chest pain or shortness of breath.      VTE Risk Mitigation (From admission, onward)           Ordered     heparin (porcine) injection 5,000 Units  Every 12 hours         04/04/24 1602                    Discharge Planning   GARY:      Code Status: Full Code   Is the patient medically ready for discharge?:     Reason for patient still in hospital (select all that apply): Patient trending condition, Laboratory test, Treatment, Consult recommendations, and Pending disposition  Discharge Plan A: Home Health, Home with family               Hipolito Mooney MD  Department of Hospital Medicine   [facility] [place] [facility]

## 2024-04-14 NOTE — ASSESSMENT & PLAN NOTE
R ankle pain, swelling, warmth present. Potential gout vs cellulitis. 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 No fracture present. Uric acid normal. Arterial US and venous US with no clots, appropriate flow  - on ceftriaxone/vanc for 7 days and still with pain and now with increased WBC and fevers  - blood cultures currently NGTD  - CT right ankle with no evidence of osteo.  -4/4-Right ankle wound then had new abscess/discharge sp I& D with wound vac placement  -4/6-Repeat OR I&D and wound vac back on. OP wound culture grew e coli. Leukocytosis stable, afebrile.   -04/9 patient had a repeat washout with I&D.  -we will reconsult Infectious Disease due to patient was white blood count increasing despite antibiotics.  -continue daily wound care.  -id adjusted antibiotics, patient was recommended to start on IV Rocephin 2 g IVPB daily renal dose vancomycin to cover for positive osteomyelitis changes seen intraoperatively.  Patient will need total of 6 weeks of antibiotics.  -repeat blood cultures no growth  -trend WBC 15.5>15.2>14

## 2024-04-14 NOTE — SUBJECTIVE & OBJECTIVE
Interval History: feeling okay, no new symptoms.     Review of Systems   Constitutional:  Negative for appetite change, fatigue and fever.   Respiratory: Negative.  Negative for cough.    Gastrointestinal: Negative.    Musculoskeletal: Negative.      Objective:     Vital Signs (Most Recent):  Temp: 98.3 °F (36.8 °C) (04/14/24 1213)  Pulse: 67 (04/14/24 1213)  Resp: 20 (04/14/24 1213)  BP: (!) 115/57 (04/14/24 1213)  SpO2: 98 % (04/14/24 0403) Vital Signs (24h Range):  Temp:  [97.6 °F (36.4 °C)-99.8 °F (37.7 °C)] 98.3 °F (36.8 °C)  Pulse:  [64-78] 67  Resp:  [16-20] 20  SpO2:  [92 %-100 %] 98 %  BP: ()/(48-62) 115/57     Weight: 90.4 kg (199 lb 4.7 oz)  Body mass index is 30.3 kg/m².    Estimated Creatinine Clearance: 11.2 mL/min (A) (based on SCr of 6.8 mg/dL (H)).     Physical Exam  Vitals and nursing note reviewed.   Constitutional:       Appearance: He is ill-appearing. He is not toxic-appearing.   HENT:      Head: Normocephalic.      Mouth/Throat:      Mouth: Mucous membranes are moist.      Pharynx: No oropharyngeal exudate.   Abdominal:      General: There is no distension.      Palpations: Abdomen is soft.      Tenderness: There is no abdominal tenderness.   Musculoskeletal:      Comments: Right ankle wrapped with wound vac   Skin:     General: Skin is warm and dry.   Neurological:      Mental Status: He is alert.          Significant Labs:   Microbiology Results (last 7 days)       Procedure Component Value Units Date/Time    Blood culture [[id number]] Collected: 04/13/24 1237    Order Status: Completed Specimen: Blood Updated: 04/13/24 2112     Blood Culture, Routine No Growth to date    Culture, Anaerobe [[id number]] Collected: 04/04/24 1110    Order Status: Completed Specimen: Incision site from Ankle, Right Updated: 04/08/24 0901     Anaerobic Culture No anaerobes isolated    Narrative:      Right Extensor Retinaculum    Culture, Anaerobe [[id number]] Collected: 04/04/24 1115    Order Status:  Completed Specimen: Wound from Ankle, Right Updated: 04/08/24 0900     Anaerobic Culture No anaerobes isolated    Narrative:      Right ankle wound            Significant Imaging: I have reviewed all pertinent imaging results/findings within the past 24 hours.   Follow up with Orthopedics, take motrin 600 mg every 6 hours as needed for pain and swelling

## 2024-04-14 NOTE — NURSING
Pt's hemoglobin is 6.7. Dr Mooney notified. Will order type and cross for 1 unit of PRBC as ordered.

## 2024-04-15 LAB
ALBUMIN SERPL BCP-MCNC: 1.3 G/DL (ref 3.5–5.2)
ALP SERPL-CCNC: 83 U/L (ref 55–135)
ALT SERPL W/O P-5'-P-CCNC: 106 U/L (ref 10–44)
ANION GAP SERPL CALC-SCNC: 11 MMOL/L (ref 8–16)
AST SERPL-CCNC: 164 U/L (ref 10–40)
BASOPHILS # BLD AUTO: 0.09 K/UL (ref 0–0.2)
BASOPHILS NFR BLD: 0.6 % (ref 0–1.9)
BILIRUB SERPL-MCNC: 0.3 MG/DL (ref 0.1–1)
BUN SERPL-MCNC: 55 MG/DL (ref 8–23)
CALCIUM SERPL-MCNC: 7.6 MG/DL (ref 8.7–10.5)
CHLORIDE SERPL-SCNC: 99 MMOL/L (ref 95–110)
CO2 SERPL-SCNC: 26 MMOL/L (ref 23–29)
CREAT SERPL-MCNC: 7.9 MG/DL (ref 0.5–1.4)
DIFFERENTIAL METHOD BLD: ABNORMAL
EOSINOPHIL # BLD AUTO: 0.2 K/UL (ref 0–0.5)
EOSINOPHIL NFR BLD: 1.1 % (ref 0–8)
ERYTHROCYTE [DISTWIDTH] IN BLOOD BY AUTOMATED COUNT: 18.5 % (ref 11.5–14.5)
EST. GFR  (NO RACE VARIABLE): 7 ML/MIN/1.73 M^2
GLUCOSE SERPL-MCNC: 157 MG/DL (ref 70–110)
HCT VFR BLD AUTO: 26 % (ref 40–54)
HGB BLD-MCNC: 8.3 G/DL (ref 14–18)
IMM GRANULOCYTES # BLD AUTO: 0.19 K/UL (ref 0–0.04)
IMM GRANULOCYTES NFR BLD AUTO: 1.3 % (ref 0–0.5)
LYMPHOCYTES # BLD AUTO: 1 K/UL (ref 1–4.8)
LYMPHOCYTES NFR BLD: 6.5 % (ref 18–48)
MCH RBC QN AUTO: 26.5 PG (ref 27–31)
MCHC RBC AUTO-ENTMCNC: 31.9 G/DL (ref 32–36)
MCV RBC AUTO: 83 FL (ref 82–98)
MONOCYTES # BLD AUTO: 0.7 K/UL (ref 0.3–1)
MONOCYTES NFR BLD: 4.8 % (ref 4–15)
NEUTROPHILS # BLD AUTO: 12.7 K/UL (ref 1.8–7.7)
NEUTROPHILS NFR BLD: 85.7 % (ref 38–73)
NRBC BLD-RTO: 0 /100 WBC
PLATELET # BLD AUTO: 354 K/UL (ref 150–450)
PMV BLD AUTO: 9 FL (ref 9.2–12.9)
POCT GLUCOSE: 158 MG/DL (ref 70–110)
POCT GLUCOSE: 184 MG/DL (ref 70–110)
POCT GLUCOSE: 186 MG/DL (ref 70–110)
POTASSIUM SERPL-SCNC: 4.6 MMOL/L (ref 3.5–5.1)
PROT SERPL-MCNC: 4.4 G/DL (ref 6–8.4)
RBC # BLD AUTO: 3.13 M/UL (ref 4.6–6.2)
SODIUM SERPL-SCNC: 136 MMOL/L (ref 136–145)
VANCOMYCIN SERPL-MCNC: 15 UG/ML
WBC # BLD AUTO: 14.82 K/UL (ref 3.9–12.7)

## 2024-04-15 PROCEDURE — 63600175 PHARM REV CODE 636 W HCPCS: Mod: HCNC | Performed by: ORTHOPAEDIC SURGERY

## 2024-04-15 PROCEDURE — 97530 THERAPEUTIC ACTIVITIES: CPT | Mod: HCNC

## 2024-04-15 PROCEDURE — 25000003 PHARM REV CODE 250: Mod: HCNC | Performed by: INTERNAL MEDICINE

## 2024-04-15 PROCEDURE — 21400001 HC TELEMETRY ROOM: Mod: HCNC

## 2024-04-15 PROCEDURE — 25000003 PHARM REV CODE 250: Mod: HCNC | Performed by: ORTHOPAEDIC SURGERY

## 2024-04-15 PROCEDURE — 80202 ASSAY OF VANCOMYCIN: CPT | Mod: HCNC | Performed by: INTERNAL MEDICINE

## 2024-04-15 PROCEDURE — 85025 COMPLETE CBC W/AUTO DIFF WBC: CPT | Mod: HCNC | Performed by: ORTHOPAEDIC SURGERY

## 2024-04-15 PROCEDURE — 99233 SBSQ HOSP IP/OBS HIGH 50: CPT | Mod: HCNC,,, | Performed by: STUDENT IN AN ORGANIZED HEALTH CARE EDUCATION/TRAINING PROGRAM

## 2024-04-15 PROCEDURE — 63600175 PHARM REV CODE 636 W HCPCS: Mod: HCNC | Performed by: INTERNAL MEDICINE

## 2024-04-15 PROCEDURE — 97535 SELF CARE MNGMENT TRAINING: CPT | Mod: HCNC

## 2024-04-15 PROCEDURE — 25000003 PHARM REV CODE 250: Mod: HCNC | Performed by: STUDENT IN AN ORGANIZED HEALTH CARE EDUCATION/TRAINING PROGRAM

## 2024-04-15 PROCEDURE — 80053 COMPREHEN METABOLIC PANEL: CPT | Mod: HCNC | Performed by: ORTHOPAEDIC SURGERY

## 2024-04-15 PROCEDURE — 63600175 PHARM REV CODE 636 W HCPCS: Mod: HCNC | Performed by: STUDENT IN AN ORGANIZED HEALTH CARE EDUCATION/TRAINING PROGRAM

## 2024-04-15 PROCEDURE — 90935 HEMODIALYSIS ONE EVALUATION: CPT | Mod: HCNC

## 2024-04-15 PROCEDURE — 63600175 PHARM REV CODE 636 W HCPCS: Mod: JZ,JG,HCNC | Performed by: INTERNAL MEDICINE

## 2024-04-15 RX ADMIN — VANCOMYCIN HYDROCHLORIDE 500 MG: 500 INJECTION, POWDER, LYOPHILIZED, FOR SOLUTION INTRAVENOUS at 04:04

## 2024-04-15 RX ADMIN — TAMSULOSIN HYDROCHLORIDE 0.8 MG: 0.4 CAPSULE ORAL at 08:04

## 2024-04-15 RX ADMIN — ONDANSETRON 4 MG: 2 INJECTION INTRAMUSCULAR; INTRAVENOUS at 09:04

## 2024-04-15 RX ADMIN — Medication 324 MG: at 04:04

## 2024-04-15 RX ADMIN — NEPHROCAP 1 CAPSULE: 1 CAP ORAL at 08:04

## 2024-04-15 RX ADMIN — EPOETIN ALFA-EPBX 10000 UNITS: 10000 INJECTION, SOLUTION INTRAVENOUS; SUBCUTANEOUS at 08:04

## 2024-04-15 RX ADMIN — AMIODARONE HYDROCHLORIDE 400 MG: 200 TABLET ORAL at 08:04

## 2024-04-15 RX ADMIN — Medication 324 MG: at 08:04

## 2024-04-15 RX ADMIN — FINASTERIDE 5 MG: 5 TABLET, FILM COATED ORAL at 08:04

## 2024-04-15 RX ADMIN — CEFTRIAXONE 2 G: 2 INJECTION, POWDER, FOR SOLUTION INTRAMUSCULAR; INTRAVENOUS at 05:04

## 2024-04-15 RX ADMIN — HEPARIN SODIUM 5000 UNITS: 5000 INJECTION INTRAVENOUS; SUBCUTANEOUS at 08:04

## 2024-04-15 RX ADMIN — HYDROMORPHONE HYDROCHLORIDE 0.5 MG: 1 INJECTION, SOLUTION INTRAMUSCULAR; INTRAVENOUS; SUBCUTANEOUS at 09:04

## 2024-04-15 RX ADMIN — ATORVASTATIN CALCIUM 80 MG: 40 TABLET, FILM COATED ORAL at 08:04

## 2024-04-15 NOTE — ASSESSMENT & PLAN NOTE
R ankle pain, swelling, warmth present. Potential gout vs cellulitis. 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 No fracture present. Uric acid normal. Arterial US and venous US with no clots, appropriate flow  - on ceftriaxone/vanc for 7 days and still with pain and now with increased WBC and fevers  - blood cultures currently NGTD  - CT right ankle with no evidence of osteo.  -4/4-Right ankle wound then had new abscess/discharge sp I& D with wound vac placement  -4/6-Repeat OR I&D and wound vac back on. OP wound culture grew e coli. Leukocytosis stable, afebrile.   -04/9 patient had a repeat washout with I&D.  -we will reconsult Infectious Disease due to patient was white blood count increasing despite antibiotics.  -continue daily wound care.  -id adjusted antibiotics, patient was recommended to start on IV Rocephin 2 g IVPB daily renal dose vancomycin to cover for positive osteomyelitis changes seen intraoperatively.  Patient will need total of 6 weeks of antibiotics.  Infectious Disease recommended:  Given his persistent leukocytosis, fever and progression of necrotic tissue to near his distal fibula added MRSA coverage with vancomycin and currently plan to treat for osteomyelitis with 6 week course.   - continue ceftriaxone 2 grams q24 hours given after HD on HD days  - continue vancomycin goal trough 15-20 mcg/ml, dosing per pharmacy       -repeat blood cultures no growth  -trend WBC 15.5>15.2>14<14.8

## 2024-04-15 NOTE — PROGRESS NOTES
Pharmacokinetic Assessment Follow Up: IV Vancomycin    Vancomycin serum concentration assessment(s):    The random level was drawn correctly and can be used to guide therapy at this time. The measurement is within the desired definitive target range of 10 to 20 mcg/mL.    Vancomycin Regimen Plan:    Give 500 mg after dialysis today.  Re-dose when the random level is less than 20 mcg/mL, next level to be drawn at 0400 on 4/17/2024    Drug levels (last 3 results):  Recent Labs   Lab Result Units 04/12/24  1626 04/15/24  0424   Vancomycin, Random ug/mL 7.2 15.0       Pharmacy will continue to follow and monitor vancomycin.    Please contact pharmacy at extension [phone] for questions regarding this assessment.    Thank you for the consult,   Jakob Goldberg Jr       Patient brief summary:  Miguel Moore is a 68 y.o. male initiated on antimicrobial therapy with IV Vancomycin for treatment of bone/joint infection    Drug Allergies:   Review of patient's allergies indicates:   Allergen Reactions    Ace inhibitors      Hyperkalemia 8/2018  Other reaction(s): Unknown    Penicillins Hives     Tolerated cefepime and cefdinir previously    Tizanidine      Felt hot       Actual Body Weight:   90.4 kg    Renal Function:   Estimated Creatinine Clearance: 9.6 mL/min (A) (based on SCr of 7.9 mg/dL (H)).,     Dialysis Method (if applicable):  intermittent HD    CBC (last 72 hours):  Recent Labs   Lab Result Units 04/13/24  0524 04/14/24  0431 04/15/24  0424   WBC K/uL 15.25* 14.08* 14.82*   Hemoglobin g/dL 7.0* 6.7* 8.3*   Hematocrit % 22.3* 21.3* 26.0*   Platelets K/uL 373 379 354   Gran % % 85.8* 85.3* 85.7*   Lymph % % 4.9* 6.7* 6.5*   Mono % % 5.8 5.2 4.8   Eosinophil % % 1.0 1.0 1.1   Basophil % % 0.5 0.4 0.6   Differential Method  Automated Automated Automated       Metabolic Panel (last 72 hours):  Recent Labs   Lab Result Units 04/13/24  0524 04/14/24  0431 04/15/24  0424   Sodium mmol/L 133* 137 136   Potassium mmol/L 4.3  4.7 4.6   Chloride mmol/L 98 97 99   CO2 mmol/L 26 24 26   Glucose mg/dL 165* 135* 157*   BUN mg/dL 29* 42* 55*   Creatinine mg/dL 5.2* 6.8* 7.9*   Albumin g/dL 1.3* 1.3* 1.3*   Total Bilirubin mg/dL 0.5 0.4 0.3   Alkaline Phosphatase U/L 81 74 83   AST U/L 134* 154* 164*   ALT U/L 72* 86* 106*       Vancomycin Administrations:  vancomycin given in the last 96 hours                     vancomycin (VANCOCIN) 1,000 mg in dextrose 5 % (D5W) 250 mL IVPB (Vial-Mate) (mg) 1,000 mg New Bag 04/12/24 1820                    Microbiologic Results:  Microbiology Results (last 7 days)       Procedure Component Value Units Date/Time    Blood culture [[id number]] Collected: 04/13/24 1237    Order Status: Completed Specimen: Blood Updated: 04/14/24 1503     Blood Culture, Routine No Growth to date      No Growth to date    Culture, Anaerobe [[id number]] Collected: 04/04/24 1110    Order Status: Completed Specimen: Incision site from Ankle, Right Updated: 04/08/24 0901     Anaerobic Culture No anaerobes isolated    Narrative:      Right Extensor Retinaculum    Culture, Anaerobe [[id number]] Collected: 04/04/24 1115    Order Status: Completed Specimen: Wound from Ankle, Right Updated: 04/08/24 0900     Anaerobic Culture No anaerobes isolated    Narrative:      Right ankle wound

## 2024-04-15 NOTE — PROGRESS NOTES
Barely talking to me today     Asked me to lower the head of hs bed but that was it     ROS unable to obtain   Past Medical History:   Diagnosis Date    Allergy     Clotting disorder     Elaquis and APlavix    Deep vein thrombosis     Diabetes mellitus, type 2     Hypertension     Nuclear sclerosis of both eyes 6/9/2017    Renal disorder     Seizures     Stroke     Urinary tract infection      Past Surgical History:   Procedure Laterality Date    CATARACT EXTRACTION W/  INTRAOCULAR LENS IMPLANT Right 10/05/2017    Dr. Tay    CATARACT EXTRACTION W/  INTRAOCULAR LENS IMPLANT Left 10/19/2017    Dr. Tay    CYSTOSCOPY W/ RETROGRADES Bilateral 2/18/2021    Procedure: CYSTOSCOPY, WITH RETROGRADE PYELOGRAM;  Surgeon: JEMMA Styles MD;  Location: [facility] OR;  Service: Urology;  Laterality: Bilateral;  REQUESTING EARLY AS POSSIBE-LO / 2/8/2021 @ 1:13PM  RN Pre Op 2-11-21, Covid NEGATIVE ON  2-17-21.  C A    ESOPHAGOGASTRODUODENOSCOPY N/A 8/17/2020    Procedure: EGD (ESOPHAGOGASTRODUODENOSCOPY);  Surgeon: Desmond Chapman MD;  Location: [facility];  Service: Endoscopy;  Laterality: N/A;    EYE SURGERY Bilateral     cataract    FEMORAL ARTERY STENT      FRACTURE SURGERY      INCISION AND DRAINAGE, LOWER EXTREMITY Right 4/4/2024    Procedure: INCISION AND DRAINAGE, LOWER EXTREMITY;  Surgeon: Jimbo Montilla III, MD;  Location: [facility] OR;  Service: Orthopedics;  Laterality: Right;    INCISION AND DRAINAGE, LOWER EXTREMITY Right 4/6/2024    Procedure: INCISION AND DRAINAGE, LOWER EXTREMITY;  Surgeon: Desmond Barillas MD;  Location: [facility] OR;  Service: Orthopedics;  Laterality: Right;  foot and ankle    INCISION AND DRAINAGE, LOWER EXTREMITY Right 4/9/2024    Procedure: INCISION AND DRAINAGE, LOWER EXTREMITY- FOOT & ANKLE;  Surgeon: Jimbo Montilla III, MD;  Location: [facility] OR;  Service: Orthopedics;  Laterality: Right;  CULTURES, VASCHE    KNEE SURGERY      bilateral scope    WOUND DRESSING Right 4/9/2024     Procedure: WOUND VAC EXCHANGE;  Surgeon: Jimbo Montilla III, MD;  Location: [facility];  Service: Orthopedics;  Laterality: Right;     Review of patient's allergies indicates:   Allergen Reactions    Ace inhibitors      Hyperkalemia 8/2018  Other reaction(s): Unknown    Penicillins Hives     Tolerated cefepime and cefdinir previously    Tizanidine      Felt hot       Current Facility-Administered Medications   Medication Dose Route Frequency Provider Last Rate Last Admin    0.9%  NaCl infusion (for blood administration)   Intravenous Q24H PRN Hipolito Mooney MD        0.9%  NaCl infusion   Intravenous Once Jimbo Montilla III, MD        acetaminophen tablet 650 mg  650 mg Oral Q6H PRN Jimbo Montilla III, MD        amiodarone tablet 400 mg  400 mg Oral BID Jimbo Montilla III, MD   400 mg at 04/15/24 0815    atorvastatin tablet 80 mg  80 mg Oral Daily Jimbo Montilla III, MD   80 mg at 04/15/24 0815    cefTRIAXone (ROCEPHIN) 2 g in dextrose 5 % in water (D5W) 100 mL IVPB (MB+)  2 g Intravenous Q24H Melinda Casillas MD   Stopped at 04/14/24 1833    dextrose 10% bolus 125 mL 125 mL  12.5 g Intravenous PRN Jimbo Montilla III, MD        dextrose 10% bolus 250 mL 250 mL  25 g Intravenous PRN Jimbo Montilla III, MD        diphenoxylate-atropine 2.5-0.025 mg per tablet 1 tablet  1 tablet Oral Q6H PRN Jimbo Montilla III, MD   1 tablet at 04/02/24 2334    epoetin marisol-epbx injection 10,000 Units  10,000 Units Subcutaneous Every Mon, Wed, Fri Karen Tamez MD   10,000 Units at 04/15/24 0815    ferrous gluconate tablet 324 mg  324 mg Oral BID  Karen Tamez MD   324 mg at 04/15/24 0815    finasteride tablet 5 mg  5 mg Oral Daily Jimbo Montilla III, MD   5 mg at 04/15/24 0815    glucagon (human recombinant) injection 1 mg  1 mg Intramuscular PRN Jimbo Montilla III, MD        glucose chewable tablet 16 g  16 g Oral PRN Jimbo Montilla III, MD        glucose chewable tablet 24 g  24 g Oral PRN  Jimbo Montilla III, MD        heparin (porcine) injection 5,000 Units  5,000 Units Subcutaneous Q12H Jimbo Montilla III, MD   5,000 Units at 04/15/24 0815    HYDROmorphone injection 0.5 mg  0.5 mg Intravenous Q4H PRN Jimbo Montilla III, MD   0.5 mg at 04/12/24 2152    insulin aspart U-100 pen 0-5 Units  0-5 Units Subcutaneous QID (AC + HS) PRN Jimbo Montilla III, MD   1 Units at 04/14/24 2051    melatonin tablet 6 mg  6 mg Oral Nightly PRN Jimbo Montilla III, MD   6 mg at 04/14/24 2052    naloxone 0.4 mg/mL injection 0.02 mg  0.02 mg Intravenous PRN Jimbo Montilla III, MD        ondansetron injection 4 mg  4 mg Intravenous Q6H PRN Jimbo Montilla III, MD   4 mg at 04/13/24 0337    oxyCODONE-acetaminophen  mg per tablet 1 tablet  1 tablet Oral Q4H PRN Jimbo Montilla III, MD   1 tablet at 04/14/24 1841    prochlorperazine injection Soln 5 mg  5 mg Intravenous Q6H PRN Jimbo Montilla III, MD   5 mg at 04/13/24 0806    sodium chloride 0.9% bolus 250 mL 250 mL  250 mL Intravenous PRN Jimbo Montilla III, MD        sodium chloride 0.9% bolus 250 mL 250 mL  250 mL Intravenous PRN Jimbo Montilla III, MD        tamsulosin 24 hr capsule 0.8 mg  2 capsule Oral Daily Jimbo Montilla III, MD   0.8 mg at 04/15/24 0815    vancomycin - pharmacy to dose   Intravenous pharmacy to manage frequency Melinda Casillas MD        vitamin renal formula (B-complex-vitamin c-folic acid) 1 mg per capsule 1 capsule  1 capsule Oral Daily Jimbo Montilla III, MD   1 capsule at 04/15/24 0815       LABS    Recent Results (from the past 24 hour(s))   POCT glucose    Collection Time: 04/14/24 11:39 AM   Result Value Ref Range    POCT Glucose 183 (H) 70 - 110 mg/dL   POCT glucose    Collection Time: 04/14/24  3:45 PM   Result Value Ref Range    POCT Glucose 238 (H) 70 - 110 mg/dL   POCT glucose    Collection Time: 04/14/24  8:25 PM   Result Value Ref Range    POCT Glucose 226 (H) 70 - 110 mg/dL   CBC Auto  Differential    Collection Time: 04/15/24  4:24 AM   Result Value Ref Range    WBC 14.82 (H) 3.90 - 12.70 K/uL    RBC 3.13 (L) 4.60 - 6.20 M/uL    Hemoglobin 8.3 (L) 14.0 - 18.0 g/dL    Hematocrit 26.0 (L) 40.0 - 54.0 %    MCV 83 82 - 98 fL    MCH 26.5 (L) 27.0 - 31.0 pg    MCHC 31.9 (L) 32.0 - 36.0 g/dL    RDW 18.5 (H) 11.5 - 14.5 %    Platelets 354 150 - 450 K/uL    MPV 9.0 (L) 9.2 - 12.9 fL    Immature Granulocytes 1.3 (H) 0.0 - 0.5 %    Gran # (ANC) 12.7 (H) 1.8 - 7.7 K/uL    Immature Grans (Abs) 0.19 (H) 0.00 - 0.04 K/uL    Lymph # 1.0 1.0 - 4.8 K/uL    Mono # 0.7 0.3 - 1.0 K/uL    Eos # 0.2 0.0 - 0.5 K/uL    Baso # 0.09 0.00 - 0.20 K/uL    nRBC 0 0 /100 WBC    Gran % 85.7 (H) 38.0 - 73.0 %    Lymph % 6.5 (L) 18.0 - 48.0 %    Mono % 4.8 4.0 - 15.0 %    Eosinophil % 1.1 0.0 - 8.0 %    Basophil % 0.6 0.0 - 1.9 %    Differential Method Automated    Comprehensive Metabolic Panel    Collection Time: 04/15/24  4:24 AM   Result Value Ref Range    Sodium 136 136 - 145 mmol/L    Potassium 4.6 3.5 - 5.1 mmol/L    Chloride 99 95 - 110 mmol/L    CO2 26 23 - 29 mmol/L    Glucose 157 (H) 70 - 110 mg/dL    BUN 55 (H) 8 - 23 mg/dL    Creatinine 7.9 (H) 0.5 - 1.4 mg/dL    Calcium 7.6 (L) 8.7 - 10.5 mg/dL    Total Protein 4.4 (L) 6.0 - 8.4 g/dL    Albumin 1.3 (L) 3.5 - 5.2 g/dL    Total Bilirubin 0.3 0.1 - 1.0 mg/dL    Alkaline Phosphatase 83 55 - 135 U/L     (H) 10 - 40 U/L     (H) 10 - 44 U/L    eGFR 7 (A) >60 mL/min/1.73 m^2    Anion Gap 11 8 - 16 mmol/L   Vancomycin, Random    Collection Time: 04/15/24  4:24 AM   Result Value Ref Range    Vancomycin, Random 15.0 Not established ug/mL   POCT glucose    Collection Time: 04/15/24  7:14 AM   Result Value Ref Range    POCT Glucose 184 (H) 70 - 110 mg/dL   ]    I/O last 3 completed shifts:  In: 360 [P.O.:360]  Out: 0     Vitals:    04/15/24 0334 04/15/24 0502 04/15/24 0715 04/15/24 0725   BP:  (!) 110/56 (!) 115/56    Pulse: 66 67 65 64   Resp:  18 18    Temp:  97 °F  (36.1 °C) 98.2 °F (36.8 °C)    TempSrc:  Oral Oral    SpO2:  100% 100%    Weight:       Height:           No Jvd, Thyromegaly or Lymphadenopathy  Lungs: Fairly clear anteriorly and laterally  Cor: RRR no G or rubs  Abd: Soft benign good bowel sounds non tender  Ext: Pos edema R leg infection     A)    ESRD hd mwf   tn  Paf  Cellulitis anemia  2nd hyperpth         P)

## 2024-04-15 NOTE — NURSING
Patient returned from dialysis via bed, NAD noted, resp even and unlabored, bed in low position, call light within reach, will continue plan of care.

## 2024-04-15 NOTE — NURSING
[facility], [facility]  Nurses Note -- 4 Eyes        Date: 04/15/2024        Skin assessed on: Q shift        [] No Pressure Injuries Present                 [x]Prevention Measures Documented     [x] Yes LDA Previously documented      [] Yes New Pressure Injury Discovered              [] LDA Added        Attending RN: DARRION Camargo     Second RN: KAMILA Urban

## 2024-04-15 NOTE — SUBJECTIVE & OBJECTIVE
Interval History: No fevers documented overnight.   Pt reports tolerating abx without issues, states R leg hurts sometimes.     Review of Systems   Constitutional:  Negative for chills and fever.   All other systems reviewed and are negative.    Objective:     Vital Signs (Most Recent):  Temp: 98.2 °F (36.8 °C) (04/15/24 0715)  Pulse: 64 (04/15/24 0725)  Resp: 18 (04/15/24 0715)  BP: (!) 115/56 (04/15/24 0715)  SpO2: 100 % (04/15/24 0715) Vital Signs (24h Range):  Temp:  [97 °F (36.1 °C)-98.4 °F (36.9 °C)] 98.2 °F (36.8 °C)  Pulse:  [64-68] 64  Resp:  [17-20] 18  SpO2:  [99 %-100 %] 100 %  BP: (105-128)/(53-60) 115/56     Weight: 90.4 kg (199 lb 4.7 oz)  Body mass index is 30.3 kg/m².    Estimated Creatinine Clearance: 9.6 mL/min (A) (based on SCr of 7.9 mg/dL (H)).     Physical Exam  Constitutional:       General: He is not in acute distress.     Appearance: He is not ill-appearing or toxic-appearing.   Pulmonary:      Effort: Pulmonary effort is normal. No respiratory distress.   Abdominal:      General: There is no distension.      Palpations: Abdomen is soft.      Tenderness: There is no abdominal tenderness. There is no guarding.   Musculoskeletal:      Right lower leg: No edema.      Left lower leg: No edema.   Skin:     General: Skin is warm and dry.      Comments: RLE wrapped, wound vac present   Neurological:      Mental Status: He is alert.          Significant Labs:   Microbiology Results (last 7 days)       Procedure Component Value Units Date/Time    Blood culture [[id number]] Collected: 04/13/24 1237    Order Status: Completed Specimen: Blood Updated: 04/14/24 1500     Blood Culture, Routine No Growth to date      No Growth to date            Significant Imaging: I have reviewed all pertinent imaging results/findings within the past 24 hours.

## 2024-04-15 NOTE — PLAN OF CARE
04/15/24 1631   Rounds   Attendance Provider;   Discharge Plan A Home Health;Other  (Wound V.A.C.; Resume dialysis.)   Why the patient remains in the hospital Requires continued medical care   Transition of Care Barriers None     Patient seen while in dialysis.  Patient has wound v.a.c. in place.  Will need rocephine x 6 weeks and vanc.  Home Health to be resumed.  Dialyss to be resumed as well.  Patient will need ID and ortho followups.

## 2024-04-15 NOTE — NURSING
[facility] [place]  Nurses Note -- 4 Eyes      4/14/2024       Skin assessed on: Q Shift      [] No Pressure Injuries Present    []Prevention Measures Documented    [x] Yes LDA  for Pressure Injury Previously documented     [] Yes New Pressure Injury Discovered   [] LDA for New Pressure Injury Added      Attending RN:  Aman Feliz RN     Second RN:  DARRION Domingo

## 2024-04-15 NOTE — PT/OT/SLP PROGRESS
Physical Therapy      Patient Name:  Miguel Moore   MRN:  [id number]    Patient not seen today secondary to Dialysis. Will follow-up as able.

## 2024-04-15 NOTE — SUBJECTIVE & OBJECTIVE
Interval History:  Patient was seen today for follow-up care.  Patient reports no chest pain or shortness a breath.  Patient's labs show hemoglobin improved up to 8.3 after 1 unit of PRBC.  Patient was scheduled for dialysis today.  Patient followed by Nephrology and Infectious Disease.  Infectious Disease recommended:  Given his persistent leukocytosis, fever and progression of necrotic tissue to near his distal fibula added MRSA coverage with vancomycin and currently plan to treat for osteomyelitis with 6 week course.   - continue ceftriaxone 2 grams q24 hours given after HD on HD days  - continue vancomycin goal trough 15-20 mcg/ml, dosing per pharmacy                        Review of Systems   Constitutional: Negative.    HENT: Negative.     Eyes: Negative.    Respiratory: Negative.     Cardiovascular: Negative.    Gastrointestinal: Negative.    Endocrine: Negative.    Genitourinary: Negative.    Musculoskeletal: Negative.    Skin: Negative.    Allergic/Immunologic: Negative.    Neurological: Negative.    Hematological: Negative.    Psychiatric/Behavioral: Negative.       Objective:     Vital Signs (Most Recent):  Temp: 98.2 °F (36.8 °C) (04/15/24 0715)  Pulse: 64 (04/15/24 0725)  Resp: 18 (04/15/24 0715)  BP: (!) 115/56 (04/15/24 0715)  SpO2: 100 % (04/15/24 0715) Vital Signs (24h Range):  Temp:  [97 °F (36.1 °C)-98.4 °F (36.9 °C)] 98.2 °F (36.8 °C)  Pulse:  [64-68] 64  Resp:  [17-20] 18  SpO2:  [99 %-100 %] 100 %  BP: (105-128)/(53-60) 115/56     Weight: 90.4 kg (199 lb 4.7 oz)  Body mass index is 30.3 kg/m².    Intake/Output Summary (Last 24 hours) at 4/15/2024 0915  Last data filed at 4/14/2024 1800  Gross per 24 hour   Intake 360 ml   Output 0 ml   Net 360 ml         Physical Exam  Vitals reviewed.   Constitutional:       Appearance: Normal appearance. He is normal weight.   HENT:      Head: Normocephalic.      Right Ear: External ear normal.      Left Ear: External ear normal.      Nose: Nose normal.       Mouth/Throat:      Mouth: Mucous membranes are moist.      Pharynx: Oropharynx is clear.   Eyes:      Extraocular Movements: Extraocular movements intact.      Conjunctiva/sclera: Conjunctivae normal.      Pupils: Pupils are equal, round, and reactive to light.   Cardiovascular:      Rate and Rhythm: Normal rate and regular rhythm.      Pulses: Normal pulses.      Heart sounds: Normal heart sounds.   Pulmonary:      Effort: Pulmonary effort is normal.      Breath sounds: Normal breath sounds.   Abdominal:      General: Abdomen is flat. Bowel sounds are normal.      Palpations: Abdomen is soft.   Musculoskeletal:         General: Normal range of motion.      Cervical back: Normal range of motion and neck supple.      Comments: S/P I&D of right ankle, with wound VAC in place.   Skin:     General: Skin is warm.      Capillary Refill: Capillary refill takes less than 2 seconds.   Neurological:      General: No focal deficit present.      Mental Status: He is alert and oriented to person, place, and time. Mental status is at baseline.      Cranial Nerves: No cranial nerve deficit.   Psychiatric:         Mood and Affect: Mood normal.         Behavior: Behavior normal.         Thought Content: Thought content normal.         Judgment: Judgment normal.             Significant Labs: All pertinent labs within the past 24 hours have been reviewed.  A1C:   Recent Labs   Lab 10/26/23  1439 03/20/24  1724   HGBA1C 5.9* 5.8*     Blood Culture:   Recent Labs   Lab 04/13/24  1237   LABBLOO No Growth to date  No Growth to date     CBC:   Recent Labs   Lab 04/14/24  0431 04/15/24  0424   WBC 14.08* 14.82*   HGB 6.7* 8.3*   HCT 21.3* 26.0*    354     CMP:   Recent Labs   Lab 04/14/24  0431 04/15/24  0424    136   K 4.7 4.6   CL 97 99   CO2 24 26   * 157*   BUN 42* 55*   CREATININE 6.8* 7.9*   CALCIUM 7.4* 7.6*   PROT 4.4* 4.4*   ALBUMIN 1.3* 1.3*   BILITOT 0.4 0.3   ALKPHOS 74 83   * 164*   ALT 86* 106*    ANIONGAP 16 11         Significant Imaging: I have reviewed all pertinent imaging results/findings within the past 24 hours.

## 2024-04-15 NOTE — ASSESSMENT & PLAN NOTE
I independently reviewed patient's lab work and images as documented. 68 year old man with ESRD on HD, DM and CVA with R sided weakness admitted on 3/21 for fatigue, hyperkalemia, found to have Afib with RVR. CT ankle from 3/28 suggestive of cellulitis, no evidence of osteomyelitis, pt had new drainage from R ankle and was found to have an abscess. S/p I&D 4/4 and 4/6 with OR cx with Ecoli. Operative noted on 4/6 notable for necrotic tissue around distal fibula. Interval improvement in fevers since surgery; however, has leukocytosis that is slowly improving. Of note, pt previously had MRSA infection of R ankle in 2021 - empiric MRSA coverage added. He has elevated liver enzymes - unclear etiology at this time, he is on multiple medications that can cause hepatotoxicity (amio, atorvastatin, abx, etc). Blcx so far unrevealing.     Recommendations  - continue ceftriaxone 2g iv daily  - continue vancomycin goal trough 15-20 mcg/ml, dosing per pharmacy   - follow up wound care/ortho recs, consider repeat CT of R ankle if concerns for infection on next wound vac exchange/dressing change

## 2024-04-15 NOTE — PROGRESS NOTES
[facility]  Progress Note    Patient Name: Miguel Moore  MRN: [id number]  Patient Class: IP- Inpatient   Admission Date: 3/20/2024  Length of Stay: 26 days  Attending Physician: Hipolito Mooney MD  Primary Care Provider: Raciel Raymond MD        Subjective:     Principal Problem:Cellulitis of right foot        HPI:  Mr Miguel Moore is a 68 y.o. man with ESRD on HD, HTN, DM with neuropathy, history of CVA with residual R sided weakness, chronic DVT on eliquis who presented with feeling poorly. He attended his usual dialysis session on Monday 3/18 without issues. He developed fatigue and R ankle swelling. He has some pain with palpation but is able to walk. No reports of trauma. No wounds. No falls. No history of gout. Today he was feeling worse so did not go to dialysis and came to the ED. No fevers. No shortness of breath. Normal appetite. No nausea, vomiting. No chest pain. No palpitations.     In the ED, afebrile with HR initially controlled then to 130s Afib RVR. Started diltiazem but became hypotensive. Diltiazem stopped. Given antibiotics. Admitted for further management.     Overview/Hospital Course:  Mr Miguel Moore is a 68 y.o. man with ESRD on HD, DM who was admitted with new onset Afib RVR, hyperkalemia from missed HD session, and R ankle cellulitis. Started amio gtt with conversion to NSR. TTE EF 55-60%, mild LAE. Cardiology consulted. Now on PO amiodarone and continues home eliquis (on this for chronic DVT). He received HD with resolution of hyperkalemia. He is on CTX for his R ankle cellulitis and was given colchicine in case gout could contribute. He developed recurrent Afib and amio gtt resumed. Cardiology was planning SONY/DCCV on 3/22. But converted to sinus. Transferred to floor 3/26.   Right ankle cellulitis improving however spiking fevers on 3/27 up to 102.7 F.  Vanc/Ceftriaxone changed to Meropenem. CT right ankle with no obvious acute infection, pain is  improving. Right forearm with swelling, u/s negative for abscess or hematoma. Blood cultures negative. Possible medication related fevers and ABx's stopped. 4/4-Right ankle wound then had new abscess/discharge sp I& D with wound vac placement by Orthopedic Surgery.  Anemia of CKD plus anemia of acute blood loss as expected from surgical procedure and patient transfused blood.  Repeat OR I&D and wound vac back on. Operative wound culture grew e coli. ID following for ABX recommendations.  Patient was scheduled for repeat washout, and wound VAC change today.  Patient followed by ID and ortho, input appreciated.  Patient improving postoperatively.  Possible discharge home in the next few days on Friday Orthopedics agrees.  Patient will need home health care with wound care and wound VAC to be set up.  Patient declines SNF.  Patient seen by infectious disease re-consultation, input appreciated.  Patient was concerns for possible osteomyelitis, recommended adjusting antibiotics to Rocephin 2 g IV q.day and renal dose vancomycin, he will need antibiotics for up to 6 weeks.  Patient's hemoglobin down to 6.7, patient on Epogen and ferrous gluconate.  Patient was seen by Infectious Disease, recommended:  Given his persistent leukocytosis, fever and progression of necrotic tissue to near his distal fibula added MRSA coverage with vancomycin and currently plan to treat for osteomyelitis with 6 week course.   - continue ceftriaxone 2 grams q24 hours given after HD on HD days  - continue vancomycin goal trough 15-20 mcg/ml, dosing per pharmacy       Interval History:  Patient was seen today for follow-up care.  Patient reports no chest pain or shortness a breath.  Patient's labs show hemoglobin improved up to 8.3 after 1 unit of PRBC.  Patient was scheduled for dialysis today.  Patient followed by Nephrology and Infectious Disease.  Infectious Disease recommended:  Given his persistent leukocytosis, fever and progression of  necrotic tissue to near his distal fibula added MRSA coverage with vancomycin and currently plan to treat for osteomyelitis with 6 week course.   - continue ceftriaxone 2 grams q24 hours given after HD on HD days  - continue vancomycin goal trough 15-20 mcg/ml, dosing per pharmacy                        Review of Systems   Constitutional: Negative.    HENT: Negative.     Eyes: Negative.    Respiratory: Negative.     Cardiovascular: Negative.    Gastrointestinal: Negative.    Endocrine: Negative.    Genitourinary: Negative.    Musculoskeletal: Negative.    Skin: Negative.    Allergic/Immunologic: Negative.    Neurological: Negative.    Hematological: Negative.    Psychiatric/Behavioral: Negative.       Objective:     Vital Signs (Most Recent):  Temp: 98.2 °F (36.8 °C) (04/15/24 0715)  Pulse: 64 (04/15/24 0725)  Resp: 18 (04/15/24 0715)  BP: (!) 115/56 (04/15/24 0715)  SpO2: 100 % (04/15/24 0715) Vital Signs (24h Range):  Temp:  [97 °F (36.1 °C)-98.4 °F (36.9 °C)] 98.2 °F (36.8 °C)  Pulse:  [64-68] 64  Resp:  [17-20] 18  SpO2:  [99 %-100 %] 100 %  BP: (105-128)/(53-60) 115/56     Weight: 90.4 kg (199 lb 4.7 oz)  Body mass index is 30.3 kg/m².    Intake/Output Summary (Last 24 hours) at 4/15/2024 0915  Last data filed at 4/14/2024 1800  Gross per 24 hour   Intake 360 ml   Output 0 ml   Net 360 ml         Physical Exam  Vitals reviewed.   Constitutional:       Appearance: Normal appearance. He is normal weight.   HENT:      Head: Normocephalic.      Right Ear: External ear normal.      Left Ear: External ear normal.      Nose: Nose normal.      Mouth/Throat:      Mouth: Mucous membranes are moist.      Pharynx: Oropharynx is clear.   Eyes:      Extraocular Movements: Extraocular movements intact.      Conjunctiva/sclera: Conjunctivae normal.      Pupils: Pupils are equal, round, and reactive to light.   Cardiovascular:      Rate and Rhythm: Normal rate and regular rhythm.      Pulses: Normal pulses.      Heart sounds:  Normal heart sounds.   Pulmonary:      Effort: Pulmonary effort is normal.      Breath sounds: Normal breath sounds.   Abdominal:      General: Abdomen is flat. Bowel sounds are normal.      Palpations: Abdomen is soft.   Musculoskeletal:         General: Normal range of motion.      Cervical back: Normal range of motion and neck supple.      Comments: S/P I&D of right ankle, with wound VAC in place.   Skin:     General: Skin is warm.      Capillary Refill: Capillary refill takes less than 2 seconds.   Neurological:      General: No focal deficit present.      Mental Status: He is alert and oriented to person, place, and time. Mental status is at baseline.      Cranial Nerves: No cranial nerve deficit.   Psychiatric:         Mood and Affect: Mood normal.         Behavior: Behavior normal.         Thought Content: Thought content normal.         Judgment: Judgment normal.             Significant Labs: All pertinent labs within the past 24 hours have been reviewed.  A1C:   Recent Labs   Lab 10/26/23  1439 03/20/24  1724   HGBA1C 5.9* 5.8*     Blood Culture:   Recent Labs   Lab 04/13/24  1237   LABBLOO No Growth to date  No Growth to date     CBC:   Recent Labs   Lab 04/14/24  0431 04/15/24  0424   WBC 14.08* 14.82*   HGB 6.7* 8.3*   HCT 21.3* 26.0*    354     CMP:   Recent Labs   Lab 04/14/24  0431 04/15/24  0424    136   K 4.7 4.6   CL 97 99   CO2 24 26   * 157*   BUN 42* 55*   CREATININE 6.8* 7.9*   CALCIUM 7.4* 7.6*   PROT 4.4* 4.4*   ALBUMIN 1.3* 1.3*   BILITOT 0.4 0.3   ALKPHOS 74 83   * 164*   ALT 86* 106*   ANIONGAP 16 11         Significant Imaging: I have reviewed all pertinent imaging results/findings within the past 24 hours.    Assessment/Plan:      * Cellulitis/Abscess  of right foot/ankle  R ankle pain, swelling, warmth present. Potential gout vs cellulitis. 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 No fracture present. Uric acid normal. Arterial  US and venous US with no clots, appropriate flow  - on ceftriaxone/vanc for 7 days and still with pain and now with increased WBC and fevers  - blood cultures currently NGTD  - CT right ankle with no evidence of osteo.  -4/4-Right ankle wound then had new abscess/discharge sp I& D with wound vac placement  -4/6-Repeat OR I&D and wound vac back on. OP wound culture grew e coli. Leukocytosis stable, afebrile.   -04/9 patient had a repeat washout with I&D.  -we will reconsult Infectious Disease due to patient was white blood count increasing despite antibiotics.  -continue daily wound care.  -id adjusted antibiotics, patient was recommended to start on IV Rocephin 2 g IVPB daily renal dose vancomycin to cover for positive osteomyelitis changes seen intraoperatively.  Patient will need total of 6 weeks of antibiotics.  Infectious Disease recommended:  Given his persistent leukocytosis, fever and progression of necrotic tissue to near his distal fibula added MRSA coverage with vancomycin and currently plan to treat for osteomyelitis with 6 week course.   - continue ceftriaxone 2 grams q24 hours given after HD on HD days  - continue vancomycin goal trough 15-20 mcg/ml, dosing per pharmacy       -repeat blood cultures no growth  -trend WBC 15.5>15.2>14<14.8        Open wound  -continue wound care and wound VAC management.  -patient on IV antibiotics cefepime 1 g Q 24 hours  -wound culture grew E coli.  -patient was followed by ID and Orthopedics, input appreciated.  -antibiotics adjusted by Infectious Disease, input appreciated.  Patient was started on IV Rocephin 2 g IVPB daily and renal dose vancomycin to cover for possible osteomyelitis changes seen intraoperatively.  Patient will need total of 6 weeks of IV antibiotics.  -trend WBC, down to 15.2>14  -repeat blood culture, no growth        Paroxysmal atrial fibrillation with RVR  Patient has Paroxysmal (<7 days) atrial fibrillation which is uncontrolled. Patient is  currently in atrial fibrillation.  - new onset Afib  - on labetalol for BP at home, eliquis for chronic DVT but did miss some doses of eliquis  - in ED given diltiazem gtt but that caused hypotension  - EKG with Afib RVR with normal Qtc  - started amiodarone with conversion to NSR. Changed to PO amiodarone  - however, he had recurrent Afib and was placed back on amio gtt.   - Cardiology consulted  - plan for SONY/DCCV on 3/22/24 but converted to sinus rhythm. Has been in/out of afib but rate controlled  - continue home eliquis  Hold Eliquis for I&D and possible future procedures.  Restart once done with procedures.    Anemia in chronic kidney disease  Patient's anemia is currently controlled. Has not received any PRBCs to date.   Lab Results   Component Value Date    HGB 6.7 (L) 04/14/2024    HCT 21.3 (L) 04/14/2024   Anemia of CKD plus anemia of acute blood loss as expected from surgical procedures.  Transfused 2 units of blood with adequate correction.  -patient receiving Epogen and iron supplement.  Continue to monitor, transfuse for hemoglobin less than 7  -hemoglobin down to 6.7, ordered 1 unit PRBC to be transfused.  Check post transfusion hemoglobin.    Chronic deep vein thrombosis (DVT) of popliteal vein of right lower extremity 9/21/20 outside [place]; unprovoked; anticoagulation  Continue holding as patient may need repeat procedure.  Heparin for DVT prophylaxis.  -restart apixaban once procedures done.      History of stroke  PT/OT evaluation completed and recommended moderate intensity therapy  Case management will assist    Secondary hyperparathyroidism of renal origin  Continue renvela       ESRD (end stage renal disease)  ESRD via LUE AVF. Last session 3/18/24. Missed 3/20/24 due to fatigue. Usually MWF  - hyperkalemic on admit. Does not appear volume overloaded.   - Nephrology consulted.  - continue MWF HD    Hemiplegia and hemiparesis following cerebral infarction affecting right dominant side  No signs  of acute stroke. Does have RUE weakness.   - continue home asa, statin  - resume apixaban once done with procedures.      Seizure disorder as sequela of cerebrovascular accident  Not currently on antiepileptics. No seizure activity reported. Monitor.       Controlled type 2 diabetes mellitus with chronic kidney disease on chronic dialysis, with long-term current use of insulin  A1c:   Lab Results   Component Value Date    HGBA1C 5.8 (H) 03/20/2024     Meds: SSI PRN to maintain goal 140-180  ADA renal diet, accuchecks, hypoglycemic protocol      Dyslipidemia  Continue statin      Benign essential HTN  Chronic. Latest blood pressure and vitals reviewed-     Temp:  [98 °F (36.7 °C)-103.4 °F (39.7 °C)]   Pulse:  [67-81]   Resp:  [16-20]   BP: (108-148)/(49-76)   SpO2:  [96 %-99 %] .   Home meds for hypertension were reviewed and noted below.   Hypertension Medications               amLODIPine (NORVASC) 10 MG tablet Take 1 tablet by mouth once daily    labetaloL (NORMODYNE) 100 MG tablet Take 1 tablet (100 mg total) by mouth once daily.          Continue current management.    Will utilize p.r.n. blood pressure medication only if patient's blood pressure greater than 180/110 and he develops symptoms such as worsening chest pain or shortness of breath.      VTE Risk Mitigation (From admission, onward)           Ordered     heparin (porcine) injection 5,000 Units  Every 12 hours         04/04/24 1602                    Discharge Planning   GARY:      Code Status: Full Code   Is the patient medically ready for discharge?:     Reason for patient still in hospital (select all that apply): Patient trending condition, Laboratory test, Treatment, Consult recommendations, and Pending disposition  Discharge Plan A: Home Health, Home with family                 Hipolito Mooney MD  Department of Hospital Medicine   [facility] [place] [facility] [place]

## 2024-04-15 NOTE — PT/OT/SLP PROGRESS
Occupational Therapy   Treatment    Name: Miguel Moore  MRN: [id number]  Admitting Diagnosis:  Cellulitis of right foot  6 Days Post-Op    Recommendations:     Discharge Recommendations: Moderate Intensity Therapy  Discharge Equipment Recommendations:  lift device, hospital bed  Barriers to discharge:  Other (Comment) (pt. is still requiring max to total assist x 2 people and has R sided pre-morbid weakness due to CVA, so high fall risk)    Assessment:     Miguel Moore is a 68 y.o. male with a medical diagnosis of Cellulitis of right foot.  He presents with HOB elevated . Performance deficits affecting function are weakness, impaired endurance, impaired self care skills, impaired functional mobility, gait instability, impaired balance, decreased upper extremity function, decreased lower extremity function, decreased safety awareness, pain, decreased ROM, impaired coordination, impaired fine motor, impaired skin, impaired cardiopulmonary response to activity, edema, impaired joint extensibility, impaired muscle length.     Rehab Prognosis:  Fair; patient would benefit from acute skilled OT services to address these deficits and reach maximum level of function.       Plan:     Patient to be seen  (3-5x/wk) to address the above listed problems via self-care/home management, therapeutic activities, therapeutic exercises  Plan of Care Expires: 04/29/24  Plan of Care Reviewed with: patient    Subjective     Chief Complaint: right sided weakness and generalized pain when moving   Patient/Family Comments/goals: patient agreed to treatment   Pain/Comfort:  Pain Rating 1: other (see comments) (generalized R side pain; did not rate)  Location - Side 1: Right    Objective:     Communicated with: RNMary Jo, prior to session.  Patient found HOB elevated with telemetry, bed alarm, peripheral IV, pressure relief boots, wound vac, oxygen upon OT entry to room.    General Precautions: Standard, fall, diabetic, respiratory     Orthopedic Precautions:N/A  Braces: N/A  Respiratory Status: Nasal cannula, flow 2  L/min      Occupational Performance:     Bed Mobility:    Patient completed Rolling/Turning to Left with  total assistance  Patient completed Scooting/Bridging with total assistance to scoot EOB and total assistance to scoot to HOB in [facility] with transporter assisting to help with scooting to HOB   Patient completed Supine to Sit with total assistance  Patient completed Sit to Supine with total assistance     Functional Mobility/Transfers:  Patient could not safely sit EOB, so did no standing today.     Activities of Daily Living:  Grooming: maximal assistance to wash face       [facility] 6 Click ADL: 10    Treatment & Education:  Occupational therapy assisted patient to sitting EOB with total assistance x 10 min. With lateral leaning to right side due to old CVA with right sided weakness.   Patient has wound vac on right lower extremity and right lower extremity is wrapped with ace wrap.   Patient needed total vc/tc for all bed mobility and for sitting EOB.   Patient left for dialysis at end of session.       Patient left HOB elevated with all lines intact, call button in reach, and RN notified and transporter present to assist with scooting     GOALS:   Multidisciplinary Problems       Occupational Therapy Goals          Problem: Occupational Therapy    Goal Priority Disciplines Outcome Interventions   Occupational Therapy Goal     OT, PT/OT Ongoing, Progressing    Description: Goals to be met by: 4/15/24     Patient will increase functional independence with ADLs by performing:    Grooming while seated at EOB w/ seated with Supervision.  Toileting from bedside commode with Min A for hygiene and clothing management.   Rolling to Bilateral with SBA.   Supine to sit with SBA.  Sit to stand with Min A.   Sitting EOB for >25 min w/ SBA for sitting balance.   Step transfer: TBD   Toilet transfer: TBD  Upper extremity exercise  program x15 reps per handout, with independence.                         Time Tracking:     OT Date of Treatment: 04/15/24  OT Start Time: 0927  OT Stop Time: 0957  OT Total Time (min): 30 min    Billable Minutes:Self Care/Home Management 15   Therapeutic Activity 15     OT/GENEVA: OT          4/15/2024

## 2024-04-15 NOTE — PROGRESS NOTES
The patient is in bed.  He reports his leg is doing okay.  He is not very talkative.     Temp:  [97 °F (36.1 °C)-98.4 °F (36.9 °C)] 98.1 °F (36.7 °C)  Pulse:  [64-86] 80  Resp:  [16-20] 16  SpO2:  [99 %-100 %] 100 %  BP: ()/(52-62) 105/58    Physical examination:    Right leg wound VAC is in place.  There is no erythema or purulence noted.  The dressing has not been changed.  Surrounding skin appears viable beneath the wound VAC. there is serosanguineous fluid in the canister a small amount.      Recent Labs   Lab 04/15/24  0424   WBC 14.82*   RBC 3.13*   HGB 8.3*   HCT 26.0*      MCV 83   MCH 26.5*   MCHC 31.9*         Current Facility-Administered Medications:     0.9%  NaCl infusion (for blood administration), , Intravenous, Q24H PRN, Hipolito Mooney MD    0.9%  NaCl infusion, , Intravenous, Once, Jimbo Montilla III, MD    acetaminophen tablet 650 mg, 650 mg, Oral, Q6H PRN, Jimbo Montilla III, MD    amiodarone tablet 400 mg, 400 mg, Oral, BID, Jimbo Montilla III, MD, 400 mg at 04/15/24 0815    atorvastatin tablet 80 mg, 80 mg, Oral, Daily, Jimbo Montilla III, MD, 80 mg at 04/15/24 0815    cefTRIAXone (ROCEPHIN) 2 g in dextrose 5 % in water (D5W) 100 mL IVPB (MB+), 2 g, Intravenous, Q24H, Melinda Casillas MD, Stopped at 04/14/24 1833    dextrose 10% bolus 125 mL 125 mL, 12.5 g, Intravenous, PRN, Jimbo Montilla III, MD    dextrose 10% bolus 250 mL 250 mL, 25 g, Intravenous, PRN, Jimbo Montilla III, MD    diphenoxylate-atropine 2.5-0.025 mg per tablet 1 tablet, 1 tablet, Oral, Q6H PRN, Jimbo Montilla III, MD, 1 tablet at 04/02/24 2334    epoetin marisol-epbx injection 10,000 Units, 10,000 Units, Subcutaneous, Every Mon, Wed, Fri, Karen Tamez MD, 10,000 Units at 04/15/24 0815    ferrous gluconate tablet 324 mg, 324 mg, Oral, BID WM, Karen Tamez MD, 324 mg at 04/15/24 0815    finasteride tablet 5 mg, 5 mg, Oral, Daily, Jimbo Montilla III, MD, 5 mg at 04/15/24 0815    glucagon  (human recombinant) injection 1 mg, 1 mg, Intramuscular, PRN, Jimbo Montilla III, MD    glucose chewable tablet 16 g, 16 g, Oral, PRN, Jimbo Montilla III, MD    glucose chewable tablet 24 g, 24 g, Oral, PRN, Jimbo Montilla III, MD    heparin (porcine) injection 5,000 Units, 5,000 Units, Subcutaneous, Q12H, Jimbo Montilla III, MD, 5,000 Units at 04/15/24 0815    HYDROmorphone injection 0.5 mg, 0.5 mg, Intravenous, Q4H PRN, Jimbo Montilla III, MD, 0.5 mg at 04/12/24 2152    insulin aspart U-100 pen 0-5 Units, 0-5 Units, Subcutaneous, QID (AC + HS) PRN, Jimbo Montilla III, MD, 1 Units at 04/14/24 2051    melatonin tablet 6 mg, 6 mg, Oral, Nightly PRN, Jmibo Montilla III, MD, 6 mg at 04/14/24 2052    naloxone 0.4 mg/mL injection 0.02 mg, 0.02 mg, Intravenous, PRN, Jimbo Montilla III, MD    ondansetron injection 4 mg, 4 mg, Intravenous, Q6H PRN, Jimbo Montilla III, MD, 4 mg at 04/13/24 0337    oxyCODONE-acetaminophen  mg per tablet 1 tablet, 1 tablet, Oral, Q4H PRN, Jimbo Montilla III, MD, 1 tablet at 04/14/24 1841    prochlorperazine injection Soln 5 mg, 5 mg, Intravenous, Q6H PRN, Jimbo Montilla III, MD, 5 mg at 04/13/24 0806    sodium chloride 0.9% bolus 250 mL 250 mL, 250 mL, Intravenous, PRN, Jimbo Montilla III, MD    sodium chloride 0.9% bolus 250 mL 250 mL, 250 mL, Intravenous, PRN, Jimbo Montilla III, MD    tamsulosin 24 hr capsule 0.8 mg, 2 capsule, Oral, Daily, Jimbo Montilla III, MD, 0.8 mg at 04/15/24 0815    Pharmacy to dose Vancomycin consult, , , Once **AND** vancomycin - pharmacy to dose, , Intravenous, pharmacy to manage frequency, Melinda Casillas MD    vitamin renal formula (B-complex-vitamin c-folic acid) 1 mg per capsule 1 capsule, 1 capsule, Oral, Daily, Jimbo Montilla III, MD, 1 capsule at 04/15/24 0815    Assessment and Plan:    68-year-old male with history of end-stage renal disease on dialysis presents with right leg infection. He underwent  I&D of his right leg abscess on 04/04 and repeat I and D and replacement of wound VAC on 4/6 and 4/9.     Cultures revealed E coli, on cefepime.     Leukocytosis remains.     Wound VAC dressing still needs to be changed.  Additional wound care orders placed.     May need additional surgery for grafting of wounds in the future but too early to tell at this point.      Jimbo Montilla MD  [facility]  [phone]  This note has been transcribed with voice recognition software, but not reviewed and may contain unrecognized errors.

## 2024-04-16 PROBLEM — E66.09 CLASS 1 OBESITY DUE TO EXCESS CALORIES WITH SERIOUS COMORBIDITY AND BODY MASS INDEX (BMI) OF 30.0 TO 30.9 IN ADULT: Status: ACTIVE | Noted: 2024-04-16

## 2024-04-16 PROBLEM — E66.811 CLASS 1 OBESITY DUE TO EXCESS CALORIES WITH SERIOUS COMORBIDITY AND BODY MASS INDEX (BMI) OF 30.0 TO 30.9 IN ADULT: Status: ACTIVE | Noted: 2024-04-16

## 2024-04-16 LAB
ALBUMIN SERPL BCP-MCNC: 1.4 G/DL (ref 3.5–5.2)
ALP SERPL-CCNC: 80 U/L (ref 55–135)
ALT SERPL W/O P-5'-P-CCNC: 118 U/L (ref 10–44)
ANION GAP SERPL CALC-SCNC: 13 MMOL/L (ref 8–16)
APTT PPP: 25.6 SEC (ref 21–32)
APTT PPP: 34.1 SEC (ref 21–32)
APTT PPP: 40.8 SEC (ref 21–32)
AST SERPL-CCNC: 176 U/L (ref 10–40)
BASOPHILS # BLD AUTO: 0.07 K/UL (ref 0–0.2)
BASOPHILS # BLD AUTO: 0.08 K/UL (ref 0–0.2)
BASOPHILS NFR BLD: 0.5 % (ref 0–1.9)
BASOPHILS NFR BLD: 0.6 % (ref 0–1.9)
BILIRUB SERPL-MCNC: 0.4 MG/DL (ref 0.1–1)
BUN SERPL-MCNC: 44 MG/DL (ref 8–23)
CALCIUM SERPL-MCNC: 8.1 MG/DL (ref 8.7–10.5)
CHLORIDE SERPL-SCNC: 97 MMOL/L (ref 95–110)
CO2 SERPL-SCNC: 24 MMOL/L (ref 23–29)
CREAT SERPL-MCNC: 6.3 MG/DL (ref 0.5–1.4)
DIFFERENTIAL METHOD BLD: ABNORMAL
DIFFERENTIAL METHOD BLD: ABNORMAL
EOSINOPHIL # BLD AUTO: 0.1 K/UL (ref 0–0.5)
EOSINOPHIL # BLD AUTO: 0.1 K/UL (ref 0–0.5)
EOSINOPHIL NFR BLD: 0.7 % (ref 0–8)
EOSINOPHIL NFR BLD: 0.8 % (ref 0–8)
ERYTHROCYTE [DISTWIDTH] IN BLOOD BY AUTOMATED COUNT: 19.5 % (ref 11.5–14.5)
ERYTHROCYTE [DISTWIDTH] IN BLOOD BY AUTOMATED COUNT: 19.5 % (ref 11.5–14.5)
EST. GFR  (NO RACE VARIABLE): 9 ML/MIN/1.73 M^2
GLUCOSE SERPL-MCNC: 137 MG/DL (ref 70–110)
HAV IGM SERPL QL IA: NORMAL
HBV CORE IGM SERPL QL IA: NORMAL
HBV SURFACE AG SERPL QL IA: NORMAL
HCT VFR BLD AUTO: 28 % (ref 40–54)
HCT VFR BLD AUTO: 29.4 % (ref 40–54)
HCV AB SERPL QL IA: NORMAL
HGB BLD-MCNC: 9 G/DL (ref 14–18)
HGB BLD-MCNC: 9.1 G/DL (ref 14–18)
IMM GRANULOCYTES # BLD AUTO: 0.18 K/UL (ref 0–0.04)
IMM GRANULOCYTES # BLD AUTO: 0.21 K/UL (ref 0–0.04)
IMM GRANULOCYTES NFR BLD AUTO: 1.4 % (ref 0–0.5)
IMM GRANULOCYTES NFR BLD AUTO: 1.4 % (ref 0–0.5)
INR PPP: 1.1 (ref 0.8–1.2)
LYMPHOCYTES # BLD AUTO: 0.8 K/UL (ref 1–4.8)
LYMPHOCYTES # BLD AUTO: 0.8 K/UL (ref 1–4.8)
LYMPHOCYTES NFR BLD: 5.5 % (ref 18–48)
LYMPHOCYTES NFR BLD: 6 % (ref 18–48)
MCH RBC QN AUTO: 26.8 PG (ref 27–31)
MCH RBC QN AUTO: 27.4 PG (ref 27–31)
MCHC RBC AUTO-ENTMCNC: 31 G/DL (ref 32–36)
MCHC RBC AUTO-ENTMCNC: 32.1 G/DL (ref 32–36)
MCV RBC AUTO: 85 FL (ref 82–98)
MCV RBC AUTO: 87 FL (ref 82–98)
MONOCYTES # BLD AUTO: 0.7 K/UL (ref 0.3–1)
MONOCYTES # BLD AUTO: 0.9 K/UL (ref 0.3–1)
MONOCYTES NFR BLD: 5.6 % (ref 4–15)
MONOCYTES NFR BLD: 6.4 % (ref 4–15)
NEUTROPHILS # BLD AUTO: 11.4 K/UL (ref 1.8–7.7)
NEUTROPHILS # BLD AUTO: 12.5 K/UL (ref 1.8–7.7)
NEUTROPHILS NFR BLD: 85.5 % (ref 38–73)
NEUTROPHILS NFR BLD: 85.6 % (ref 38–73)
NRBC BLD-RTO: 0 /100 WBC
NRBC BLD-RTO: 0 /100 WBC
PLATELET # BLD AUTO: 325 K/UL (ref 150–450)
PLATELET # BLD AUTO: 361 K/UL (ref 150–450)
PMV BLD AUTO: 9 FL (ref 9.2–12.9)
PMV BLD AUTO: 9.7 FL (ref 9.2–12.9)
POCT GLUCOSE: 121 MG/DL (ref 70–110)
POCT GLUCOSE: 137 MG/DL (ref 70–110)
POCT GLUCOSE: 176 MG/DL (ref 70–110)
POCT GLUCOSE: 204 MG/DL (ref 70–110)
POTASSIUM SERPL-SCNC: 4.7 MMOL/L (ref 3.5–5.1)
PROT SERPL-MCNC: 5.6 G/DL (ref 6–8.4)
PROTHROMBIN TIME: 11.9 SEC (ref 9–12.5)
RBC # BLD AUTO: 3.28 M/UL (ref 4.6–6.2)
RBC # BLD AUTO: 3.4 M/UL (ref 4.6–6.2)
SODIUM SERPL-SCNC: 134 MMOL/L (ref 136–145)
WBC # BLD AUTO: 13.3 K/UL (ref 3.9–12.7)
WBC # BLD AUTO: 14.57 K/UL (ref 3.9–12.7)

## 2024-04-16 PROCEDURE — 85025 COMPLETE CBC W/AUTO DIFF WBC: CPT | Mod: 91,HCNC | Performed by: STUDENT IN AN ORGANIZED HEALTH CARE EDUCATION/TRAINING PROGRAM

## 2024-04-16 PROCEDURE — 51798 US URINE CAPACITY MEASURE: CPT | Mod: HCNC

## 2024-04-16 PROCEDURE — 85025 COMPLETE CBC W/AUTO DIFF WBC: CPT | Mod: HCNC | Performed by: ORTHOPAEDIC SURGERY

## 2024-04-16 PROCEDURE — 63600175 PHARM REV CODE 636 W HCPCS: Mod: HCNC | Performed by: STUDENT IN AN ORGANIZED HEALTH CARE EDUCATION/TRAINING PROGRAM

## 2024-04-16 PROCEDURE — 36415 COLL VENOUS BLD VENIPUNCTURE: CPT | Mod: HCNC,XB | Performed by: STUDENT IN AN ORGANIZED HEALTH CARE EDUCATION/TRAINING PROGRAM

## 2024-04-16 PROCEDURE — 85730 THROMBOPLASTIN TIME PARTIAL: CPT | Mod: 91,HCNC | Performed by: STUDENT IN AN ORGANIZED HEALTH CARE EDUCATION/TRAINING PROGRAM

## 2024-04-16 PROCEDURE — 63600175 PHARM REV CODE 636 W HCPCS: Mod: HCNC | Performed by: ORTHOPAEDIC SURGERY

## 2024-04-16 PROCEDURE — 99233 SBSQ HOSP IP/OBS HIGH 50: CPT | Mod: HCNC,,,

## 2024-04-16 PROCEDURE — 36415 COLL VENOUS BLD VENIPUNCTURE: CPT | Mod: HCNC | Performed by: ORTHOPAEDIC SURGERY

## 2024-04-16 PROCEDURE — 97530 THERAPEUTIC ACTIVITIES: CPT | Mod: HCNC,CQ

## 2024-04-16 PROCEDURE — 99223 1ST HOSP IP/OBS HIGH 75: CPT | Mod: HCNC,,, | Performed by: NURSE PRACTITIONER

## 2024-04-16 PROCEDURE — 25000003 PHARM REV CODE 250: Mod: HCNC | Performed by: ORTHOPAEDIC SURGERY

## 2024-04-16 PROCEDURE — 97110 THERAPEUTIC EXERCISES: CPT | Mod: HCNC

## 2024-04-16 PROCEDURE — 25000003 PHARM REV CODE 250: Mod: HCNC | Performed by: INTERNAL MEDICINE

## 2024-04-16 PROCEDURE — 80074 ACUTE HEPATITIS PANEL: CPT | Mod: HCNC | Performed by: STUDENT IN AN ORGANIZED HEALTH CARE EDUCATION/TRAINING PROGRAM

## 2024-04-16 PROCEDURE — 21400001 HC TELEMETRY ROOM: Mod: HCNC

## 2024-04-16 PROCEDURE — 97110 THERAPEUTIC EXERCISES: CPT | Mod: HCNC,CQ

## 2024-04-16 PROCEDURE — 80053 COMPREHEN METABOLIC PANEL: CPT | Mod: HCNC | Performed by: ORTHOPAEDIC SURGERY

## 2024-04-16 PROCEDURE — 85610 PROTHROMBIN TIME: CPT | Mod: HCNC | Performed by: STUDENT IN AN ORGANIZED HEALTH CARE EDUCATION/TRAINING PROGRAM

## 2024-04-16 PROCEDURE — 97535 SELF CARE MNGMENT TRAINING: CPT | Mod: HCNC

## 2024-04-16 PROCEDURE — 99233 SBSQ HOSP IP/OBS HIGH 50: CPT | Mod: HCNC,,, | Performed by: STUDENT IN AN ORGANIZED HEALTH CARE EDUCATION/TRAINING PROGRAM

## 2024-04-16 RX ORDER — HEPARIN SODIUM,PORCINE/D5W 25000/250
0-40 INTRAVENOUS SOLUTION INTRAVENOUS CONTINUOUS
Status: DISCONTINUED | OUTPATIENT
Start: 2024-04-16 | End: 2024-04-19

## 2024-04-16 RX ADMIN — AMIODARONE HYDROCHLORIDE 400 MG: 200 TABLET ORAL at 09:04

## 2024-04-16 RX ADMIN — INSULIN ASPART 2 UNITS: 100 INJECTION, SOLUTION INTRAVENOUS; SUBCUTANEOUS at 05:04

## 2024-04-16 RX ADMIN — PROCHLORPERAZINE EDISYLATE 5 MG: 5 INJECTION INTRAMUSCULAR; INTRAVENOUS at 01:04

## 2024-04-16 RX ADMIN — HEPARIN SODIUM 12 UNITS/KG/HR: 10000 INJECTION, SOLUTION INTRAVENOUS at 09:04

## 2024-04-16 RX ADMIN — FINASTERIDE 5 MG: 5 TABLET, FILM COATED ORAL at 09:04

## 2024-04-16 RX ADMIN — Medication 324 MG: at 09:04

## 2024-04-16 RX ADMIN — TAMSULOSIN HYDROCHLORIDE 0.8 MG: 0.4 CAPSULE ORAL at 09:04

## 2024-04-16 RX ADMIN — ATORVASTATIN CALCIUM 80 MG: 40 TABLET, FILM COATED ORAL at 09:04

## 2024-04-16 RX ADMIN — NEPHROCAP 1 CAPSULE: 1 CAP ORAL at 09:04

## 2024-04-16 RX ADMIN — Medication 324 MG: at 05:04

## 2024-04-16 NOTE — SUBJECTIVE & OBJECTIVE
Interval History: No fevers documented overnight.   Feeling ok this morning, working with therapy.     Review of Systems   Constitutional:  Negative for chills and fever.   All other systems reviewed and are negative.    Objective:     Vital Signs (Most Recent):  Temp: 98.4 °F (36.9 °C) (04/16/24 0751)  Pulse: 71 (04/16/24 0855)  Resp: 18 (04/16/24 0751)  BP: (!) 107/54 (04/16/24 0751)  SpO2: 96 % (04/16/24 0400) Vital Signs (24h Range):  Temp:  [98.1 °F (36.7 °C)-99.5 °F (37.5 °C)] 98.4 °F (36.9 °C)  Pulse:  [66-86] 71  Resp:  [16-19] 18  SpO2:  [94 %-96 %] 96 %  BP: ()/(50-58) 107/54     Weight: 90.4 kg (199 lb 4.7 oz)  Body mass index is 30.3 kg/m².    Estimated Creatinine Clearance: 12.1 mL/min (A) (based on SCr of 6.3 mg/dL (H)).     Physical Exam  Constitutional:       General: He is not in acute distress.     Appearance: He is not ill-appearing or toxic-appearing.   Eyes:      General:         Right eye: No discharge.         Left eye: No discharge.   Pulmonary:      Effort: Pulmonary effort is normal. No respiratory distress.   Abdominal:      General: There is no distension.      Palpations: Abdomen is soft.      Tenderness: There is no abdominal tenderness.   Musculoskeletal:         General: Swelling (RUE - stable) present.   Skin:     Comments: R ankle wound vac - minimal swelling present   Neurological:      Mental Status: He is alert.          Significant Labs:   Microbiology Results (last 7 days)       Procedure Component Value Units Date/Time    Blood culture [[id number]] Collected: 04/13/24 1237    Order Status: Completed Specimen: Blood Updated: 04/15/24 1503     Blood Culture, Routine No Growth to date      No Growth to date      No Growth to date            Significant Imaging: I have reviewed all pertinent imaging results/findings within the past 24 hours.

## 2024-04-16 NOTE — ASSESSMENT & PLAN NOTE
Not currently on antiepileptics.   No seizure activity reported.   Seizure precautions   Monitor.

## 2024-04-16 NOTE — PLAN OF CARE
TN called [facility] wound Vac services [phone](delivery Center)   Melecio the hospital liaison returned TN call and will deliver the home wound vac to the patient's room today.  Will call TN when he is on his way.

## 2024-04-16 NOTE — ASSESSMENT & PLAN NOTE
Patient's anemia is currently controlled. Has not received any PRBCs to date.   Lab Results   Component Value Date    HGB 9.0 (L) 04/16/2024    HCT 28.0 (L) 04/16/2024     Anemia of CKD plus anemia of acute blood loss as expected from surgical procedures.  -Transfused 2 units of blood with adequate correction. (Transfused 1U on 04/06, and one unit on 04/14)  -patient receiving Epogen and iron supplement.  -Continue to monitor, transfuse for hemoglobin less than 7

## 2024-04-16 NOTE — ASSESSMENT & PLAN NOTE
-elevated liver enzymes, worsening on 04/16   - unclear etiology at this time. However, he is on multiple medications that can cause hepatotoxicity (amio, atorvastatin, abx, etc).   -Hepatitis panel ordered.   -US abdomen with Hepatosplenomegaly.   -Discussed with cardiology, okay to stop PO amiodarone and monitor at this time (04/16/24)  -Will hold statin   -GI consulted.

## 2024-04-16 NOTE — PLAN OF CARE
REASSESSMENT:  [employer] Wound vac to be delivered today to the patient's room.    Barrier: Not medically stable. Awaiting final recs from Ortho, if additional surgical procedures will be needed or not.    Plan A:  Home with home home health+wound care.  HD [facility], per ID ancef and vancomycin after dialysis x 6w (~5/20)  Plan B:  SNF/ patient refused.  LTAC?       04/16/24 0908   Discharge Reassessment   Assessment Type Discharge Planning Reassessment   Did the patient's condition or plan change since previous assessment? Yes   Discharge Plan discussed with: Adult children;Patient   Communicated GARY with patient/caregiver Date not available/Unable to determine   Discharge Plan A Home with family;Home Health   Discharge Plan B Home with family;Home Health   DME Needed Upon Discharge  wheelchair;lift device  (hospital bed was delivered on 4/12)   Transition of Care Barriers None   Why the patient remains in the hospital Requires continued medical care   Post-Acute Status   Post-Acute Authorization HME;Home Health;Dialysis   Post-Acute Placement Status Patient declined/refused   HME Status Pending therapy documentation  (family requesting wheel chair for home use. PT recs lift)   Home Health Status Pending medical clearance/testing   Diaylsis Status Pending medical clearance/testing  ([facility] MWF to resume)   Discharge Delays (!) Procedure Scheduling (IR, OR, Labs, Echo, Cath, Echo, EEG)

## 2024-04-16 NOTE — ASSESSMENT & PLAN NOTE
Chronic.   Hold home amlodipine and labetolol given low-normal BP  Avoid hypotension     Latest blood pressure and vitals reviewed-     Temp:  [98 °F (36.7 °C)-99.5 °F (37.5 °C)]   Pulse:  [66-79]   Resp:  [17-19]   BP: ()/(50-60)   SpO2:  [94 %-100 %] .   Home meds for hypertension were reviewed and noted below.   Hypertension Medications               amLODIPine (NORVASC) 10 MG tablet Take 1 tablet by mouth once daily    labetaloL (NORMODYNE) 100 MG tablet Take 1 tablet (100 mg total) by mouth once daily.            Will utilize p.r.n. blood pressure medication only if patient's blood pressure greater than 180/110 and he develops symptoms such as worsening chest pain or shortness of breath.

## 2024-04-16 NOTE — PROGRESS NOTES
Vancomycin consult follow-up:    Patient reviewed, dialysis scheduled every Mon, Wed, Fri, cultures reviewed, no new levels, no dose today; Next levels due: random due 4/17/2024 at 0400

## 2024-04-16 NOTE — PROGRESS NOTES
Awake alert oriented NAD    Denies cns ent cp gi gu rheum sx    Tells me that his dressing on his leg was taken off but he still has his wound vac   Past Medical History:   Diagnosis Date    Allergy     Clotting disorder     Elaquis and APlavix    Deep vein thrombosis     Diabetes mellitus, type 2     Hypertension     Nuclear sclerosis of both eyes 6/9/2017    Renal disorder     Seizures     Stroke     Urinary tract infection      Past Surgical History:   Procedure Laterality Date    CATARACT EXTRACTION W/  INTRAOCULAR LENS IMPLANT Right 10/05/2017    Dr. Tay    CATARACT EXTRACTION W/  INTRAOCULAR LENS IMPLANT Left 10/19/2017    Dr. Tay    CYSTOSCOPY W/ RETROGRADES Bilateral 2/18/2021    Procedure: CYSTOSCOPY, WITH RETROGRADE PYELOGRAM;  Surgeon: JEMMA Styles MD;  Location: [facility] OR;  Service: Urology;  Laterality: Bilateral;  REQUESTING EARLY AS POSSIBE-LO / 2/8/2021 @ 1:13PM  RN Pre Op 2-11-21, Covid NEGATIVE ON  2-17-21.  C A    ESOPHAGOGASTRODUODENOSCOPY N/A 8/17/2020    Procedure: EGD (ESOPHAGOGASTRODUODENOSCOPY);  Surgeon: Desmond Chapman MD;  Location: [facility] ENDO;  Service: Endoscopy;  Laterality: N/A;    EYE SURGERY Bilateral     cataract    FEMORAL ARTERY STENT      FRACTURE SURGERY      INCISION AND DRAINAGE, LOWER EXTREMITY Right 4/4/2024    Procedure: INCISION AND DRAINAGE, LOWER EXTREMITY;  Surgeon: Jimbo Montilla III, MD;  Location: [facility] OR;  Service: Orthopedics;  Laterality: Right;    INCISION AND DRAINAGE, LOWER EXTREMITY Right 4/6/2024    Procedure: INCISION AND DRAINAGE, LOWER EXTREMITY;  Surgeon: Desmond Barillas MD;  Location: [facility] OR;  Service: Orthopedics;  Laterality: Right;  foot and ankle    INCISION AND DRAINAGE, LOWER EXTREMITY Right 4/9/2024    Procedure: INCISION AND DRAINAGE, LOWER EXTREMITY- FOOT & ANKLE;  Surgeon: Jimbo Montilla III, MD;  Location: [facility] OR;  Service: Orthopedics;  Laterality: Right;  CULTURES, VASCHE    KNEE SURGERY      bilateral scope     WOUND DRESSING Right 4/9/2024    Procedure: WOUND VAC EXCHANGE;  Surgeon: Jimbo Montilla III, MD;  Location: [facility] OR;  Service: Orthopedics;  Laterality: Right;     Review of patient's allergies indicates:   Allergen Reactions    Ace inhibitors      Hyperkalemia 8/2018  Other reaction(s): Unknown    Penicillins Hives     Tolerated cefepime and cefdinir previously    Tizanidine      Felt hot       Current Facility-Administered Medications   Medication Dose Route Frequency Provider Last Rate Last Admin    0.9%  NaCl infusion (for blood administration)   Intravenous Q24H PRN Hipolito Mooney MD        0.9%  NaCl infusion   Intravenous Once Jimbo Montilla III, MD        acetaminophen tablet 650 mg  650 mg Oral Q6H PRN Jimbo Montilla III, MD        amiodarone tablet 400 mg  400 mg Oral BID Jimbo Montilla III, MD   400 mg at 04/16/24 0914    atorvastatin tablet 80 mg  80 mg Oral Daily Jimbo Montilla III, MD   80 mg at 04/16/24 0914    ceFAZolin (ANCEF) 1 g in dextrose 5 % in water (D5W) 50 mL IVPB (MB+)  1 g Intravenous Daily Laney Underwood MD        dextrose 10% bolus 125 mL 125 mL  12.5 g Intravenous PRN Jimbo Montilla III, MD        dextrose 10% bolus 250 mL 250 mL  25 g Intravenous PRN Jimbo Montilla III, MD        diphenoxylate-atropine 2.5-0.025 mg per tablet 1 tablet  1 tablet Oral Q6H PRN Jimbo Montilla III, MD   1 tablet at 04/02/24 2334    epoetin marisol-epbx injection 10,000 Units  10,000 Units Subcutaneous Every Mon, Wed, Fri Karen Tamez MD   10,000 Units at 04/15/24 0815    ferrous gluconate tablet 324 mg  324 mg Oral BID  Karen Tamez MD   324 mg at 04/16/24 0914    finasteride tablet 5 mg  5 mg Oral Daily Jimbo Montilla III, MD   5 mg at 04/16/24 0915    glucagon (human recombinant) injection 1 mg  1 mg Intramuscular PRN Jimbo Montilla III, MD        glucose chewable tablet 16 g  16 g Oral PRN Jimbo Montilla III, MD        glucose chewable tablet 24 g  24 g Oral PRN  Jimbo Montilla III, MD        heparin 25,000 units in dextrose 5% (100 units/ml) IV bolus from bag LOW INTENSITY nomogram - [facility]  60 Units/kg (Adjusted) Intravenous Once Pepe Martino T., DO        heparin 25,000 units in dextrose 5% (100 units/ml) IV bolus from bag LOW INTENSITY nomogram - [facility]  60 Units/kg (Adjusted) Intravenous PRN Pepe Martino T., DO        heparin 25,000 units in dextrose 5% (100 units/ml) IV bolus from bag LOW INTENSITY nomogram - [facility]  30 Units/kg (Adjusted) Intravenous PRN Nusrat MartinoJean PaulCarolyn T., DO        heparin 25,000 units in dextrose 5% 250 mL (100 units/mL) infusion LOW INTENSITY nomogram - [facility]  0-40 Units/kg/hr (Adjusted) Intravenous Continuous Pepe Martino T., DO 9.3 mL/hr at 04/16/24 0937 12 Units/kg/hr at 04/16/24 0937    HYDROmorphone injection 0.5 mg  0.5 mg Intravenous Q4H PRN Jimbo Montilla III, MD   0.5 mg at 04/15/24 2107    insulin aspart U-100 pen 0-5 Units  0-5 Units Subcutaneous QID (AC + HS) PRN Jimbo Montilla III, MD   1 Units at 04/14/24 2051    melatonin tablet 6 mg  6 mg Oral Nightly PRN Jimbo Montilla III, MD   6 mg at 04/14/24 2052    naloxone 0.4 mg/mL injection 0.02 mg  0.02 mg Intravenous PRN Jimbo Montilla III, MD        ondansetron injection 4 mg  4 mg Intravenous Q6H PRJimbo Dominguez III, MD   4 mg at 04/15/24 2107    oxyCODONE-acetaminophen  mg per tablet 1 tablet  1 tablet Oral Q4H PRJimbo Dominguez III, MD   1 tablet at 04/14/24 1841    prochlorperazine injection Soln 5 mg  5 mg Intravenous Q6H PRN Jimbo Montilla III, MD   5 mg at 04/16/24 0137    sodium chloride 0.9% bolus 250 mL 250 mL  250 mL Intravenous PRN Jimbo Montilla III MD        sodium chloride 0.9% bolus 250 mL 250 mL  250 mL Intravenous PRN Jimbo Montilla III, MD        tamsulosin 24 hr capsule 0.8 mg  2 capsule Oral Daily Jimbo Montlila III, MD   0.8 mg at 04/16/24 0914    vancomycin - pharmacy to dose   Intravenous pharmacy to manage frequency Sonja  MD Melinda        vitamin renal formula (B-complex-vitamin c-folic acid) 1 mg per capsule 1 capsule  1 capsule Oral Daily Jimbo Montilla III, MD   1 capsule at 04/16/24 0915       LABS    Recent Results (from the past 24 hour(s))   POCT glucose    Collection Time: 04/15/24  4:33 PM   Result Value Ref Range    POCT Glucose 186 (H) 70 - 110 mg/dL   POCT glucose    Collection Time: 04/15/24  7:26 PM   Result Value Ref Range    POCT Glucose 158 (H) 70 - 110 mg/dL   CBC Auto Differential    Collection Time: 04/16/24  3:45 AM   Result Value Ref Range    WBC 14.57 (H) 3.90 - 12.70 K/uL    RBC 3.40 (L) 4.60 - 6.20 M/uL    Hemoglobin 9.1 (L) 14.0 - 18.0 g/dL    Hematocrit 29.4 (L) 40.0 - 54.0 %    MCV 87 82 - 98 fL    MCH 26.8 (L) 27.0 - 31.0 pg    MCHC 31.0 (L) 32.0 - 36.0 g/dL    RDW 19.5 (H) 11.5 - 14.5 %    Platelets 361 150 - 450 K/uL    MPV 9.7 9.2 - 12.9 fL    Immature Granulocytes 1.4 (H) 0.0 - 0.5 %    Gran # (ANC) 12.5 (H) 1.8 - 7.7 K/uL    Immature Grans (Abs) 0.21 (H) 0.00 - 0.04 K/uL    Lymph # 0.8 (L) 1.0 - 4.8 K/uL    Mono # 0.9 0.3 - 1.0 K/uL    Eos # 0.1 0.0 - 0.5 K/uL    Baso # 0.07 0.00 - 0.20 K/uL    nRBC 0 0 /100 WBC    Gran % 85.5 (H) 38.0 - 73.0 %    Lymph % 5.5 (L) 18.0 - 48.0 %    Mono % 6.4 4.0 - 15.0 %    Eosinophil % 0.7 0.0 - 8.0 %    Basophil % 0.5 0.0 - 1.9 %    Differential Method Automated    Comprehensive Metabolic Panel    Collection Time: 04/16/24  3:45 AM   Result Value Ref Range    Sodium 134 (L) 136 - 145 mmol/L    Potassium 4.7 3.5 - 5.1 mmol/L    Chloride 97 95 - 110 mmol/L    CO2 24 23 - 29 mmol/L    Glucose 137 (H) 70 - 110 mg/dL    BUN 44 (H) 8 - 23 mg/dL    Creatinine 6.3 (H) 0.5 - 1.4 mg/dL    Calcium 8.1 (L) 8.7 - 10.5 mg/dL    Total Protein 5.6 (L) 6.0 - 8.4 g/dL    Albumin 1.4 (L) 3.5 - 5.2 g/dL    Total Bilirubin 0.4 0.1 - 1.0 mg/dL    Alkaline Phosphatase 80 55 - 135 U/L     (H) 10 - 40 U/L     (H) 10 - 44 U/L    eGFR 9 (A) >60 mL/min/1.73 m^2    Anion Gap  13 8 - 16 mmol/L   POCT glucose    Collection Time: 04/16/24  7:49 AM   Result Value Ref Range    POCT Glucose 121 (H) 70 - 110 mg/dL   APTT    Collection Time: 04/16/24  8:24 AM   Result Value Ref Range    aPTT 25.6 21.0 - 32.0 sec   Protime-INR    Collection Time: 04/16/24  8:24 AM   Result Value Ref Range    Prothrombin Time 11.9 9.0 - 12.5 sec    INR 1.1 0.8 - 1.2   CBC auto differential    Collection Time: 04/16/24  8:24 AM   Result Value Ref Range    WBC 13.30 (H) 3.90 - 12.70 K/uL    RBC 3.28 (L) 4.60 - 6.20 M/uL    Hemoglobin 9.0 (L) 14.0 - 18.0 g/dL    Hematocrit 28.0 (L) 40.0 - 54.0 %    MCV 85 82 - 98 fL    MCH 27.4 27.0 - 31.0 pg    MCHC 32.1 32.0 - 36.0 g/dL    RDW 19.5 (H) 11.5 - 14.5 %    Platelets 325 150 - 450 K/uL    MPV 9.0 (L) 9.2 - 12.9 fL    Immature Granulocytes 1.4 (H) 0.0 - 0.5 %    Gran # (ANC) 11.4 (H) 1.8 - 7.7 K/uL    Immature Grans (Abs) 0.18 (H) 0.00 - 0.04 K/uL    Lymph # 0.8 (L) 1.0 - 4.8 K/uL    Mono # 0.7 0.3 - 1.0 K/uL    Eos # 0.1 0.0 - 0.5 K/uL    Baso # 0.08 0.00 - 0.20 K/uL    nRBC 0 0 /100 WBC    Gran % 85.6 (H) 38.0 - 73.0 %    Lymph % 6.0 (L) 18.0 - 48.0 %    Mono % 5.6 4.0 - 15.0 %    Eosinophil % 0.8 0.0 - 8.0 %    Basophil % 0.6 0.0 - 1.9 %    Differential Method Automated    ]    I/O last 3 completed shifts:  In: 600 [P.O.:600]  Out: 1000 [Other:1000]    Vitals:    04/16/24 0320 04/16/24 0400 04/16/24 0751 04/16/24 0855   BP:  (!) 99/50 (!) 107/54    Pulse: 66 67 68 71   Resp:  18 18    Temp:  98.1 °F (36.7 °C) 98.4 °F (36.9 °C)    TempSrc:  Oral Oral    SpO2:  96%     Weight:       Height:           No Jvd, Thyromegaly or Lymphadenopathy  Lungs: Fairly clear anteriorly and laterally  Cor: RRR no G or rubs  Abd: Soft benign good bowel sounds non tender  Ext: Pos edema R leg infection     A)    ESRD hd mwf   tn  Paf  Cellulitis anemia  2nd hyperpth         P)    Renal Diet  Home meds  Protect access  HD mwf  EPO prn   Binders prn   Adjust all meds to the degree of renal  fx  Close follow up I/O and weights  Maintain Hydration   Wound care and antibiotx as per 1ry

## 2024-04-16 NOTE — ASSESSMENT & PLAN NOTE
Body mass index is 30.3 kg/m². Morbid obesity complicates all aspects of disease management from diagnostic modalities to treatment. Weight loss encouraged and health benefits explained to patient.

## 2024-04-16 NOTE — PROGRESS NOTES
[facility] Surg  Infectious Disease  Progress Note    Patient Name: Miguel Moore  MRN: [id number]  Admission Date: 3/20/2024  Length of Stay: 27 days  Attending Physician: Pepe Martino DO  Primary Care Provider: Raciel Raymond MD    Isolation Status: No active isolations  Assessment/Plan:      ID  * Cellulitis/Abscess  of right foot/ankle  I independently reviewed patient's lab work and images as documented. 68 year old man with ESRD on HD, DM and CVA with R sided weakness admitted on 3/21 for fatigue, hyperkalemia, found to have Afib with RVR. CT ankle from 3/28 suggestive of cellulitis, no evidence of osteomyelitis, pt had new drainage from R ankle and was found to have an abscess. S/p I&D 4/4 and 4/6 with OR cx with Ecoli. Operative noted on 4/6 notable for necrotic tissue around distal fibula. Interval improvement in fevers since surgery; however, has leukocytosis that is slowly improving. Of note, pt previously had MRSA infection of R ankle in 2021 - empiric MRSA coverage added. He has elevated liver enzymes - unclear etiology at this time, he is on multiple medications that can cause hepatotoxicity (amio, atorvastatin, abx, etc). Blcx so far unrevealing.     Recommendations  - stop ceftriaxone, switch to renal dosing ancef (ordered)  - continue vancomycin goal trough 15-20 mcg/ml, dosing per pharmacy   -anticipate 6w course of abx (ancef/vanc), ruben 5/20 if no further surgical intervention is planned  - follow up wound care/ortho recs, consider repeat CT of R ankle if concerns for infection on next wound vac exchange/dressing change    Outpatient Antibiotic Therapy Plan:    Please send referral to [facility].    1) Infection: Ecoli + presumed MRSA RLE OM    2) Discharge Antibiotics:    Intravenous antibiotics:  Vancomycin 500 mg after HD on HD days (current dosing)  Ancef 2g/2g/3g after HD on HD days      3) Therapy Duration:  6w    Estimated end date of IV  antibiotics: 5/20/24    4) Outpatient Weekly Labs:    Order the following labs to be drawn on Mondays:   CBC  CMP   Vancomycin trough. Target 15-20    If discharged on vancomycin IV, order the following additional labs to be drawn on Thursdays:  CMP   Vancomycin trough. Target 15-20    If vancomycin trough is not at target (15-20) prior to discharge, schedule vancomycin trough to be drawn before their fourth outpatient dose.    5) Fax Lab Results to Infectious Diseases Provider: [facility] Fax Number: [phone]    6) Outpatient Infectious Diseases Follow-up    Follow-up appointment will be arranged by the ID clinic and will be found in the patient's appointments tab.    Prior to discharge, please ensure the patient's follow-up has been scheduled.    If there is still no follow-up scheduled prior to discharge, please send an EPIC message to Tamara Brewster in Infectious Diseases.                  Thank you for your consult. I will follow-up with patient. Please contact us if you have any additional questions. Above d/w primary team.       Laney Underwood MD  Infectious Disease  [facility] [place] - Med Surg    Subjective:     Principal Problem:Cellulitis of right foot    HPI: 67 yo male with ESRD on HD, DM and CVA with R sided weakness admitted for fatigue, hyperkalemia, found to have Afib with RVR. ID consulted for fevers. Admission course notable for R ankle cellulitis, CT with extensive subcutaneous edema, no focal fluid collection or OM seen. He spiked a fever on 3/27 and 3/28 - blood cx obtained ngtd. TTE on 3/20 without IE. Pt is currently on vancomycin and meropenem. Prior to admission, pt reported he had pain in R ankle, denied fevers chills, sick contacts. He states that since he's been here, he has had some loose stools and RUE swelling. Reported hives with PCN in ithe past, tolerated carb/cephs.   Interval History: No fevers documented overnight.   Feeling ok this morning, working with  therapy.     Review of Systems   Constitutional:  Negative for chills and fever.   All other systems reviewed and are negative.    Objective:     Vital Signs (Most Recent):  Temp: 98.4 °F (36.9 °C) (04/16/24 0751)  Pulse: 71 (04/16/24 0855)  Resp: 18 (04/16/24 0751)  BP: (!) 107/54 (04/16/24 0751)  SpO2: 96 % (04/16/24 0400) Vital Signs (24h Range):  Temp:  [98.1 °F (36.7 °C)-99.5 °F (37.5 °C)] 98.4 °F (36.9 °C)  Pulse:  [66-86] 71  Resp:  [16-19] 18  SpO2:  [94 %-96 %] 96 %  BP: ()/(50-58) 107/54     Weight: 90.4 kg (199 lb 4.7 oz)  Body mass index is 30.3 kg/m².    Estimated Creatinine Clearance: 12.1 mL/min (A) (based on SCr of 6.3 mg/dL (H)).     Physical Exam  Constitutional:       General: He is not in acute distress.     Appearance: He is not ill-appearing or toxic-appearing.   Eyes:      General:         Right eye: No discharge.         Left eye: No discharge.   Pulmonary:      Effort: Pulmonary effort is normal. No respiratory distress.   Abdominal:      General: There is no distension.      Palpations: Abdomen is soft.      Tenderness: There is no abdominal tenderness.   Musculoskeletal:         General: Swelling (RUE - stable) present.   Skin:     Comments: R ankle wound vac - minimal swelling present   Neurological:      Mental Status: He is alert.          Significant Labs:   Microbiology Results (last 7 days)       Procedure Component Value Units Date/Time    Blood culture [[id number]] Collected: 04/13/24 1237    Order Status: Completed Specimen: Blood Updated: 04/15/24 1503     Blood Culture, Routine No Growth to date      No Growth to date      No Growth to date            Significant Imaging: I have reviewed all pertinent imaging results/findings within the past 24 hours.

## 2024-04-16 NOTE — CONSULTS
[facility] G[facility]ology Consultation Note    Reason for consult: elevated lfts    PCP:   Raciel Raymond       LOS: 27        Initial History of Present Illness (HPI):  This is a 68 y.o. male consulted to GI service for elevated lfts. PMH ESRD on HD, HTN, DM with neuropathy, history of CVA with residual R sided weakness, chronic DVT on eliquis. Admitted for cellulitis and abscess of right foot. Patient withdrawn and only answering yes or no. Most of information from chart. He denies a hx of liver disease, hepatitis or heavy drinking.  Denies confusion, nausea, vomiting or abdominal pain.    Wbc 13.3, hgb 9.0, plt 325, lfts 134/72 now 176/118  Bp /52-54      Medical History:  has a past medical history of Allergy, Clotting disorder, Deep vein thrombosis, Diabetes mellitus, type 2, Hypertension, Nuclear sclerosis of both eyes (6/9/2017), Renal disorder, Seizures, Stroke, and Urinary tract infection.    Surgical History:  has a past surgical history that includes Knee surgery; Femoral artery stent; Esophagogastroduodenoscopy (N/A, 8/17/2020); Eye surgery (Bilateral); Cystoscopy w/ retrogrades (Bilateral, 2/18/2021); Fracture surgery; Cataract extraction w/  intraocular lens implant (Right, 10/05/2017); Cataract extraction w/  intraocular lens implant (Left, 10/19/2017); incision and drainage, lower extremity (Right, 4/4/2024); incision and drainage, lower extremity (Right, 4/6/2024); incision and drainage, lower extremity (Right, 4/9/2024); and Wound dressing (Right, 4/9/2024).      Objective Findings:    Vital Signs:  Temp:  [98.1 °F (36.7 °C)-99.5 °F (37.5 °C)]   Pulse:  [66-84]   Resp:  [16-19]   BP: ()/(50-58)   SpO2:  [94 %-96 %]   Body mass index is 30.3 kg/m².      Physical Exam  Vitals and nursing note reviewed.   Constitutional:       Appearance: Normal appearance.   HENT:      Head: Normocephalic.   Pulmonary:      Effort: Pulmonary effort is normal.   Abdominal:      General: Bowel sounds  are normal.      Palpations: Abdomen is soft.   Skin:     General: Skin is warm and dry.   Neurological:      Mental Status: He is alert and oriented to person, place, and time.   Psychiatric:         Mood and Affect: Mood normal.         Speech: Speech is delayed.         Behavior: Behavior is withdrawn.         Thought Content: Thought content normal.         Judgment: Judgment normal.               Labs:  Lab Results   Component Value Date    WBC 13.30 (H) 04/16/2024    HGB 9.0 (L) 04/16/2024    HCT 28.0 (L) 04/16/2024     04/16/2024    CHOL 190 04/27/2023    TRIG 106 04/27/2023    HDL 36 (L) 04/27/2023     (H) 04/16/2024     (H) 04/16/2024     (L) 04/16/2024    K 4.7 04/16/2024    CL 97 04/16/2024    CREATININE 6.3 (H) 04/16/2024    BUN 44 (H) 04/16/2024    CO2 24 04/16/2024    TSH 2.672 03/20/2024    PSA 10.2 (H) 11/19/2020    INR 1.1 04/16/2024    HGBA1C 5.8 (H) 03/20/2024             Imaging: US abdomen- pending        I have independently reviewed and interpreted the imaging above           Elevated lfts. Hypotension. Hyperlipidemia.   Plan/ Recommendations:  1.  Gradual increase in lfts noted in patient with persistent hypotension. Agree with acute hep panel and abdominal US. Rec hold statin and optimize hypotension. Trend lfts.      Thank you so much for allowing us to participate in the care of Miguel Moore . Please contact us if you have any additional questions.    Bren Ovalles NP  Gastroenterology  [facility] - Med Surg

## 2024-04-16 NOTE — CONSULTS
[facility] Surg  Wound Care  WO CAMRON    Patient Name:  Miguel Moore   MRN:  [id number]  Date: 4/16/2024  Diagnosis: Cellulitis of right foot    History:     Past Medical History:   Diagnosis Date    Allergy     Clotting disorder     Elaquis and APlavix    Deep vein thrombosis     Diabetes mellitus, type 2     Hypertension     Nuclear sclerosis of both eyes 6/9/2017    Renal disorder     Seizures     Stroke     Urinary tract infection        Social History     Socioeconomic History    Marital status: Single   Occupational History    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, etc   Tobacco Use    Smoking status: Never    Smokeless tobacco: Never   Substance and Sexual Activity    Alcohol use: No    Drug use: No   Social History Narrative    Lives with daughter       Precautions:     Allergies as of 03/20/2024 - Reviewed 03/20/2024   Allergen Reaction Noted    Ace inhibitors  09/03/2018    Penicillins Hives 01/13/2015    Tizanidine  06/30/2020       [facility] Assessment Details/Treatment     Active Problem List with Overview Notes    Diagnosis Date Noted    Open wound 03/26/2024    Paroxysmal atrial fibrillation with RVR 03/20/2024    Cellulitis/Abscess  of right foot/ankle 03/20/2024    Bilateral posterior capsular opacification 05/02/2023    Pseudophakia 01/06/2022    History of diabetic ulcer of foot     Elevated PSA 2/18/21 prostate bx normal 02/18/2021 2/18/21 prostate bx normal  1/25/24 MRI prostate PIRADS 2      Pulmonary nodule 1/2021 4 mm nodule. 01/06/2021 1/6/2021 CT abd/pelvis: 4 mm nodule in the right middle lobe      Elevated liver enzymes 01/04/2021    Chronic deep vein thrombosis (DVT) of popliteal vein of right lower extremity 9/21/20 outside US; unprovoked; anticoagulation 09/21/2020    History of fungal infection candida parasilosis hospitalization 8/2020 08/29/2020     -Found to have candidemia - source unclear  -infectious disease consulted, Started on micafungin and now  converted to fluconazole  -Repeat cultures negative so far  -Dialysis line removed 8/28.   line replaced on 08/31 after line holiday.  -end date of fluconazole will be 09/11/2020.  Patient has ophthalmology follow-up scheduled on 09/08/2020. Tentative end date 9/11/2020 If no endophthalmitis. If noted to have endophthalmitis during optho visit, would need at least 4-6 weeks of treatment      Anemia in chronic kidney disease 08/26/2020    Nephrotic syndrome 08/16/2020    Type 2 diabetes mellitus with diabetic neuropathy, with long-term current use of insulin 05/10/2018    History of stroke 12/04/2017    CME (cystoid macular edema), bilateral 11/16/2017    Refractive error 11/16/2017    Senile nuclear sclerosis 10/05/2017    Right sided weakness 09/11/2017    Secondary hyperparathyroidism of renal origin 08/03/2017    Vitamin D deficiency 08/03/2017    Nuclear sclerosis, left 06/09/2017    Cortical cataract of both eyes 06/09/2017    DM type 2 without retinopathy 06/09/2017    Microcytosis 9/2019 labs c/w AoCD and AoCKD 03/22/2017     Seen on admission as well 2015; normal Hb, low MCV.  Normal RDW  08/17/2020 EGD normal esophagus.  Discolored nodular and texture change mucosa in the antrum.  Normal duodenum.  Path with foveolar hyperplasia and early xanthoma formation.  H pylori negative.  Mild chronic inflammation without acute activity      ESRD (end stage renal disease) 03/22/2017 2/27/20 renal US with dopplers no ALF.  Medical renal disease  8/2020 renal US: 1. Symmetric diffusely increased cortical echogenicity and elevated resistive indices, nonspecific indicators of chronic medical renal disease.  2. Prostatomegaly.  Some of the provided images are suggestive of a distinct hyperechoic component of the prostate gland, of uncertain etiology or significance, and potentially artifactual.  Dedicated prostate ultrasound or MRI may be of benefit for further characterization.    Started on HD while hospitalized for  progression to ESRD 8/2020  -Continue dialysis per nephrology  -Outpatient HD has been arranged  -Had planned discharge 8/27 if remained afebrile and cultures negative.  Unfortunately his blood culture grew Candida parapsilosis  -Dr. Gomez with ID consulted and case discussed with him.  Started micafungin 8/27 and now on fluconazole.  -Dialysis line removed after HD 8/28 and plan line holiday over weekend.  -new line by IR on 8/31, tolerated dialysis fluid.  -continue outpatient dialysis.      Benign essential HTN 03/21/2017 01/06/2021 TTE mild left atrial enlargement.  LV normal size and systolic function LVEF 55%.  Indeterminate diastolic function.  Mild right atrial enlargement.  Normal RV systolic function.  Normal RV size.  PA pressure 20.  Normal CVP.  Three.      Dyslipidemia 03/21/2017    Controlled type 2 diabetes mellitus with chronic kidney disease on chronic dialysis, with long-term current use of insulin 03/21/2017    BPH (benign prostatic hyperplasia) 2/18/21 prostate bx normal 03/21/2017 6/26/2017 renal US mod prostatomegaly.      Seizure disorder as sequela of cerebrovascular accident 03/21/2017    Hemiplegia and hemiparesis following cerebral infarction affecting right dominant side 03/21/2017 4/11 Ortho wrote orders to change VAC dressing twice a week.   4/15 Ortho consulted wound care to remove VAC and begin wound care right leg- VAC versus dressing changes  A 68 year old male admitted 3/20/24 from home with complaint of malaise x 3 days and discoloration to right lower leg. Missed HD.  WOC nurse consult 3/26, 4/2, and 4/4  4/4 S/P Irrigation and debridement right leg, excisional. Placement of KCI/3M VAC per Dr. Montilla for right leg abscess  4/6 S/P Incision and drainage right lower extremity. Placement of KCI/3M VAC per Dr. Barillas for cellulitis of right foot and right ankle   4/9 S/P Irrigation debridement right leg abscess and wound, excisional; placement of wound VAC dressing per  Dr. Montilla  4/16 WBC 13.3 Hgb 9.0 Hct 28.0 Alb 1.4  Assessment:  Photodocumentation    Right lateral ankle wounds- Three openings created-  1- anterior incision 7 cm(L) 5 cm(W) with 1.5 cm undermining and small amount slough in base of wound  2- proximal lateral incision 5 cm(L) 2 cm(W) with scant amount slough in base  3- distal lateral incision 3 cm(L) 3 cm(W) filled with slough  Surrounding skin macerated with drainage under NPWT drape    Right lateral heel- Unstageable pressure injury with soft necrotic roof. Ulcer 5 cm circular area. Necrotic wound was included under NPWT drape.  Noted [employer] NPWT in patient's room  Treatment/Plan:  Cleansed wounds and periwound skin with Vashe. Applied 3M Cavilon No Sting Film Barrier to periwound skin.  Local wound care to incisions with Vashe moistened gauze. Filled areas of undermining with dressing and covered with dry gauze.   Local wound care to necrotic heel with dry gauze. Secured dressings with Kerlix roll and replaced EHOB boot to offload pressure from heel.   Orders placed.  1345 Discussed wound and treatment plan with Dr. Montilla. Will manage wound with dressing changes at this time and plan to discharge home with dressing changes to right ankle and heel instead of NPWT. Dr. Montilla will follow wound outpatient when discharged and monitor/plan for closure of wound after discharge.

## 2024-04-16 NOTE — ASSESSMENT & PLAN NOTE
-continue wound care and wound VAC management.  -patient on IV antibiotics cefepime 1 g Q 24 hours  -wound culture grew E coli.  -patient was followed by ID and Orthopedics, input appreciated.  -trend WBC  -repeat blood culture, no growth

## 2024-04-16 NOTE — NURSING
[facility] [place]  Nurses Note -- 4 Eyes      4/16/2024       Skin assessed on: Q Shift      [] No Pressure Injuries Present    []Prevention Measures Documented    [x] Yes LDA  for Pressure Injury Previously documented     [] Yes New Pressure Injury Discovered   [] LDA for New Pressure Injury Added      Attending RN:  Stephanie Barthelemy, RN     Second RN:  Jennifer Velasquez RN

## 2024-04-16 NOTE — NURSING
Pt off the unit going to US by bed via transport. IV access in place, heparin continuous . NAD or pain noted.

## 2024-04-16 NOTE — NURSING
Wound Vac removed by wound care nurse. [facility] called for wound vac  in sterile processing. Conformation number is [id number]. Transport requested for wound vac  to sterile processing.

## 2024-04-16 NOTE — PLAN OF CARE
Pt AAO x 4, free from falls/injury, and able to make needs known during shift. VSS on 2L NC.  Telemetry monitoring continued on box 8657. No acute distress noted. Wound care performed by wound care nurse. Wound vac removed and taken by transport to sterile processing. Bed locked and in lowest position. Bedside table and call light in reach.     Problem: Infection  Goal: Absence of Infection Signs and Symptoms  Outcome: Ongoing, Progressing  Intervention: Prevent or Manage Infection  Flowsheets (Taken 4/16/2024 1727)  Isolation Precautions: protective     Problem: Diabetes Comorbidity  Goal: Blood Glucose Level Within Targeted Range  Outcome: Ongoing, Progressing  Intervention: Monitor and Manage Glycemia  Flowsheets (Taken 4/16/2024 1727)  Glycemic Management:   blood glucose monitored   supplemental insulin given     Problem: Skin Injury Risk Increased  Goal: Skin Health and Integrity  Outcome: Ongoing, Progressing  Intervention: Optimize Skin Protection  Flowsheets (Taken 4/16/2024 1727)  Pressure Reduction Techniques:   frequent weight shift encouraged   weight shift assistance provided   positioned off wounds   pressure points protected  Skin Protection:   transparent dressing maintained   tubing/devices free from skin contact  Head of Bed (HOB) Positioning: HOB elevated     Problem: Impaired Wound Healing  Goal: Optimal Wound Healing  Outcome: Ongoing, Progressing

## 2024-04-16 NOTE — NURSING
[facility] [place]  Nurses Note -- 4 Eyes      4/15/2024       Skin assessed on: Q Shift      [] No Pressure Injuries Present    []Prevention Measures Documented    [x] Yes LDA  for Pressure Injury Previously documented     [] Yes New Pressure Injury Discovered   [] LDA for New Pressure Injury Added      Attending RN:  Jennifer Velasquez RN     Second RN:  DARRION Camargo

## 2024-04-16 NOTE — PROGRESS NOTES
[facility] Medicine  Progress Note    Patient Name: Miguel Moore  MRN: [id number]  Patient Class: IP- Inpatient   Admission Date: 3/20/2024  Length of Stay: 27 days  Attending Physician: Pepe Martino DO  Primary Care Provider: Raciel Raymond MD        Subjective:     Principal Problem:Cellulitis of right foot        HPI:  Mr Miguel Moore is a 68 y.o. man with ESRD on HD, HTN, DM with neuropathy, history of CVA with residual R sided weakness, chronic DVT on eliquis who presented with feeling poorly. He attended his usual dialysis session on Monday 3/18 without issues. He developed fatigue and R ankle swelling. He has some pain with palpation but is able to walk. No reports of trauma. No wounds. No falls. No history of gout. Today he was feeling worse so did not go to dialysis and came to the ED. No fevers. No shortness of breath. Normal appetite. No nausea, vomiting. No chest pain. No palpitations.     In the ED, afebrile with HR initially controlled then to 130s Afib RVR. Started diltiazem but became hypotensive. Diltiazem stopped. Given antibiotics. Admitted for further management.     Overview/Hospital Course:   68 year old man with ESRD on HD, DM and CVA with R sided weakness admitted on 3/21 for fatigue, hyperkalemia, found to have Afib with RVR. Started amio gtt with conversion to NSR. TTE EF 55-60%, mild LAE. Cardiology consulted. Transitioned on PO amiodarone and continues home eliquis (on this for chronic DVT). He received HD with resolution of hyperkalemia. Transferred out the ICU on 03/26. CT ankle from 3/28 suggestive of cellulitis, no evidence of osteomyelitis, pt had new drainage from R ankle and was found to have an abscess. Orthopedic surgery consulted. Pt S/p I&D 4/4 and 4/6 with OR cx with Ecoli. Operative noted on 4/6 notable for necrotic tissue around distal fibula. Interval improvement in fevers since surgery; however, has leukocytosis that is slowly improving. ID  consulted-empiric MRSA coverage added given hx of MRSA. PT/OT recommends moderate intensity therapy, but patient declines SNF. Will need home health on discharge. He has elevated liver enzymes - unclear etiology at this time, he is on multiple medications that can cause hepatotoxicity (amio, atorvastatin, abx, etc). Hepatitis panel ordered. US abdomen with Hepatosplenomegaly. Blcx so far unrevealing. GI consulted.     Interval History:  No acute overnight events.  Patient remained afebrile.  Alert and oriented x3 this morning.  Admits he feels fatigued.  Denies any pain in any of his extremities or joints at this time.  Admits weakness.    Review of Systems   Constitutional:  Positive for fatigue. Negative for chills and fever.   Musculoskeletal:  Negative for arthralgias, back pain and joint swelling.   Neurological:  Positive for weakness.   Psychiatric/Behavioral:  Negative for confusion and hallucinations.      Objective:     Vital Signs (Most Recent):  Temp: 98 °F (36.7 °C) (04/16/24 1228)  Pulse: 75 (04/16/24 1228)  Resp: 18 (04/16/24 1228)  BP: 134/60 (04/16/24 1228)  SpO2: 100 % (04/16/24 1300) Vital Signs (24h Range):  Temp:  [98 °F (36.7 °C)-99.5 °F (37.5 °C)] 98 °F (36.7 °C)  Pulse:  [66-79] 75  Resp:  [17-19] 18  SpO2:  [94 %-100 %] 100 %  BP: ()/(50-60) 134/60     Weight: 90.4 kg (199 lb 4.7 oz)  Body mass index is 30.3 kg/m².    Intake/Output Summary (Last 24 hours) at 4/16/2024 1502  Last data filed at 4/16/2024 0938  Gross per 24 hour   Intake 240 ml   Output --   Net 240 ml         Physical Exam  Vitals and nursing note reviewed.   Constitutional:       General: He is not in acute distress.     Appearance: He is obese. He is not ill-appearing.   HENT:      Mouth/Throat:      Mouth: Mucous membranes are moist.   Pulmonary:      Effort: Pulmonary effort is normal. No respiratory distress.   Abdominal:      General: There is no distension.      Palpations: Abdomen is soft.      Tenderness: There  is no abdominal tenderness.   Musculoskeletal:         General: Swelling (RUE - stable) present. No tenderness.   Skin:     Comments: R ankle wound vac - minimal swelling present   Neurological:      Mental Status: He is alert.      Motor: Weakness (generalized weakness) present.             Significant Labs: All pertinent labs within the past 24 hours have been reviewed.    Significant Imaging: I have reviewed all pertinent imaging results/findings within the past 24 hours.    Assessment/Plan:      * Cellulitis/Abscess  of right foot/ankle  R ankle pain, swelling, warmth present. Potential gout vs cellulitis. 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 No fracture present. Uric acid normal. Arterial US and venous US with no clots, appropriate flow    - initially on ceftriaxone/vanc for 7 days and still with pain and increased WBC and fevers  - blood cultures currently NGTD  - CT right ankle with no evidence of osteo.  - orthopedic surgery consulted.   4/4-Right ankle wound then had new abscess/discharge sp I& D with wound vac placement  4/6-Repeat OR I&D and wound vac back on. OP wound culture grew e coli.   04/9 patient had a repeat washout with I&D.  -Blood cx with no growth to date   -ID consulted: changed to Ancef (04/16) and continue on vancomycin for MRSA coverage. Patient will need total of 6 weeks of antibiotics.   -continue daily wound care.  -repeat blood cultures no growth  -trend WBC      Paroxysmal atrial fibrillation with RVR  Patient has Paroxysmal (<7 days) atrial fibrillation which is uncontrolled. Patient is currently in atrial fibrillation.  - new onset Afib  - on labetalol for BP at home, eliquis for chronic DVT but did miss some doses of eliquis  - in ED given diltiazem gtt but that caused hypotension  - EKG with Afib RVR with normal Qtc  - started amiodarone with conversion to NSR. Changed to PO amiodarone  - however, he had recurrent Afib and was placed back on  amio gtt.   - Cardiology consulted: given worsening LFTs, okay to stop amiodarone and monitor (04/16/24)  - plan for SONY/DCCV on 3/22/24 but converted to sinus rhythm. Has been in/out of afib but rate controlled  - Hold Eliquis for I&D and possible future procedures at this time.    -On heparin gtt  -Restart eliquis once done with procedures.    Open wound  -continue wound care and wound VAC management.  -patient on IV antibiotics cefepime 1 g Q 24 hours  -wound culture grew E coli.  -patient was followed by ID and Orthopedics, input appreciated.  -trend WBC  -repeat blood culture, no growth        Elevated liver enzymes  -elevated liver enzymes, worsening on 04/16   - unclear etiology at this time. However, he is on multiple medications that can cause hepatotoxicity (amio, atorvastatin, abx, etc).   -Hepatitis panel ordered.   -US abdomen with Hepatosplenomegaly.   -Discussed with cardiology, okay to stop PO amiodarone and monitor at this time (04/16/24)  -Will hold statin   -GI consulted.    ESRD (end stage renal disease)  ESRD via LUE AVF. Last session 3/18/24. Missed 3/20/24 due to fatigue. Usually MWF  - hyperkalemic on admit. Does not appear volume overloaded.   - Nephrology consulted.  - continue MWF HD    Anemia in chronic kidney disease  Patient's anemia is currently controlled. Has not received any PRBCs to date.   Lab Results   Component Value Date    HGB 9.0 (L) 04/16/2024    HCT 28.0 (L) 04/16/2024     Anemia of CKD plus anemia of acute blood loss as expected from surgical procedures.  -Transfused 2 units of blood with adequate correction. (Transfused 1U on 04/06, and one unit on 04/14)  -patient receiving Epogen and iron supplement.  -Continue to monitor, transfuse for hemoglobin less than 7    Secondary hyperparathyroidism of renal origin  Continue renvela       Benign essential HTN  Chronic.   Hold home amlodipine and labetolol given low-normal BP  Avoid hypotension     Latest blood pressure and  vitals reviewed-     Temp:  [98 °F (36.7 °C)-99.5 °F (37.5 °C)]   Pulse:  [66-79]   Resp:  [17-19]   BP: ()/(50-60)   SpO2:  [94 %-100 %] .   Home meds for hypertension were reviewed and noted below.   Hypertension Medications               amLODIPine (NORVASC) 10 MG tablet Take 1 tablet by mouth once daily    labetaloL (NORMODYNE) 100 MG tablet Take 1 tablet (100 mg total) by mouth once daily.            Will utilize p.r.n. blood pressure medication only if patient's blood pressure greater than 180/110 and he develops symptoms such as worsening chest pain or shortness of breath.    Dyslipidemia  Holding statin in setting of elevated LFTs      Controlled type 2 diabetes mellitus with chronic kidney disease on chronic dialysis, with long-term current use of insulin  A1c:   Lab Results   Component Value Date    HGBA1C 5.8 (H) 03/20/2024     Meds: SSI PRN to maintain goal 140-180  ADA renal diet, accuchecks, hypoglycemic protocol      Hemiplegia and hemiparesis following cerebral infarction affecting right dominant side  No signs of acute stroke. Does have RUE weakness.   - continue home asa, statin  - resume apixaban once done with procedures.      History of stroke  PT/OT evaluation completed and recommended moderate intensity therapy  However, patient decline SNF. Would prefer to go home with home health on discharge   Case management will assist    Seizure disorder as sequela of cerebrovascular accident  Not currently on antiepileptics.   No seizure activity reported.   Seizure precautions   Monitor.       Chronic deep vein thrombosis (DVT) of popliteal vein of right lower extremity 9/21/20 outside US; unprovoked; anticoagulation  Continue holding as patient may need repeat procedure.  Heparin gtt for now   -restart apixaban once procedures done.      Class 1 obesity due to excess calories with serious comorbidity and body mass index (BMI) of 30.0 to 30.9 in adult  Body mass index is 30.3 kg/m². Morbid  obesity complicates all aspects of disease management from diagnostic modalities to treatment. Weight loss encouraged and health benefits explained to patient.           VTE Risk Mitigation (From admission, onward)           Ordered     heparin 25,000 units in dextrose 5% (100 units/ml) IV bolus from bag LOW INTENSITY nomogram - [facility]  As needed (PRN)        Question:  Heparin Infusion Adjustment (DO NOT MODIFY ANSWER)  Answer:  [email or web address]\Images\Pharmacy\HeparinInfusions\heparin LOW INTENSITY nomogram for [facility] [id number]    04/16/24 0805     heparin 25,000 units in dextrose 5% (100 units/ml) IV bolus from bag LOW INTENSITY nomogram - [facility]  As needed (PRN)        Question:  Heparin Infusion Adjustment (DO NOT MODIFY ANSWER)  Answer:  [email or web address]\Images\Pharmacy\HeparinInfusions\heparin LOW INTENSITY nomogram for [facility] [id number]    04/16/24 0805     heparin 25,000 units in dextrose 5% 250 mL (100 units/mL) infusion LOW INTENSITY nomogram - [facility]  Continuous        Question:  Begin at (units/kg/hr)  Answer:  12    04/16/24 0805                    Discharge Planning   GARY:      Code Status: Full Code   Is the patient medically ready for discharge?:     Reason for patient still in hospital (select all that apply): Patient trending condition, Treatment, Consult recommendations, and PT / OT recommendations  Discharge Plan A: Home with family, Home Health   Discharge Delays: (!) Procedure Scheduling (IR, OR, Labs, Echo, Cath, Echo, EEG)              Pepe Martino DO  Department of [facility] Medicine   [facility] - [place], [place] - [facility]

## 2024-04-16 NOTE — ASSESSMENT & PLAN NOTE
Patient has Paroxysmal (<7 days) atrial fibrillation which is uncontrolled. Patient is currently in atrial fibrillation.  - new onset Afib  - on labetalol for BP at home, eliquis for chronic DVT but did miss some doses of eliquis  - in ED given diltiazem gtt but that caused hypotension  - EKG with Afib RVR with normal Qtc  - started amiodarone with conversion to NSR. Changed to PO amiodarone  - however, he had recurrent Afib and was placed back on amio gtt.   - Cardiology consulted: given worsening LFTs, okay to stop amiodarone and monitor (04/16/24)  - plan for SONY/DCCV on 3/22/24 but converted to sinus rhythm. Has been in/out of afib but rate controlled  - Hold Eliquis for I&D and possible future procedures at this time.    -On heparin gtt  -Restart eliquis once done with procedures.

## 2024-04-16 NOTE — PROGRESS NOTES
The patient is in bed.  He had a dressing change.  He denies any complaints.     Temp:  [98 °F (36.7 °C)-99.5 °F (37.5 °C)] 98 °F (36.7 °C)  Pulse:  [66-79] 75  Resp:  [17-19] 18  SpO2:  [94 %-100 %] 100 %  BP: ()/(50-60) 134/60    Physical examination:    Right ankle dressing is clean and dry.  Photos reviewed.  No evidence of purulence.  Some slough in the wound is noted.      Recent Labs   Lab 04/16/24  0824   WBC 13.30*   RBC 3.28*   HGB 9.0*   HCT 28.0*      MCV 85   MCH 27.4   MCHC 32.1         Current Facility-Administered Medications:     0.9%  NaCl infusion (for blood administration), , Intravenous, Q24H PRN, Hipolito Mooney MD    0.9%  NaCl infusion, , Intravenous, Once, Jimbo Montilla III, MD    acetaminophen tablet 650 mg, 650 mg, Oral, Q6H PRN, Jimbo Montilla III, MD    atorvastatin tablet 80 mg, 80 mg, Oral, Daily, Jimbo Montilla III, MD, 80 mg at 04/16/24 0914    ceFAZolin (ANCEF) 1 g in dextrose 5 % in water (D5W) 50 mL IVPB (MB+), 1 g, Intravenous, Daily, Laney Underwood MD    dextrose 10% bolus 125 mL 125 mL, 12.5 g, Intravenous, PRN, Jimbo Montilla III, MD    dextrose 10% bolus 250 mL 250 mL, 25 g, Intravenous, PRN, Jimbo Montilla III, MD    diphenoxylate-atropine 2.5-0.025 mg per tablet 1 tablet, 1 tablet, Oral, Q6H PRN, Jimbo Montilla III, MD, 1 tablet at 04/02/24 2334    epoetin marisol-epbx injection 10,000 Units, 10,000 Units, Subcutaneous, Every Mon, Wed, Fri, Karen Tamez MD, 10,000 Units at 04/15/24 0815    ferrous gluconate tablet 324 mg, 324 mg, Oral, BID WM, Karen Tamez MD, 324 mg at 04/16/24 0914    finasteride tablet 5 mg, 5 mg, Oral, Daily, Jimbo Montilla III, MD, 5 mg at 04/16/24 0915    glucagon (human recombinant) injection 1 mg, 1 mg, Intramuscular, PRN, Jimbo Montilla III, MD    glucose chewable tablet 16 g, 16 g, Oral, PRN, Jimbo Montilla III, MD    glucose chewable tablet 24 g, 24 g, Oral, PRN, Jimbo Montilla III, MD    heparin  25,000 units in dextrose 5% (100 units/ml) IV bolus from bag LOW INTENSITY nomogram - [facility], 60 Units/kg (Adjusted), Intravenous, PRN, Martino, Nusrat-My T., DO    heparin 25,000 units in dextrose 5% (100 units/ml) IV bolus from bag LOW INTENSITY nomogram - [facility], 30 Units/kg (Adjusted), Intravenous, PRN, Martino, Nusrat-My T., DO    heparin 25,000 units in dextrose 5% 250 mL (100 units/mL) infusion LOW INTENSITY nomogram - [facility], 0-40 Units/kg/hr (Adjusted), Intravenous, Continuous, Martino, Nusrat-My T., DO, Last Rate: 9.3 mL/hr at 04/16/24 0937, 12 Units/kg/hr at 04/16/24 0937    HYDROmorphone injection 0.5 mg, 0.5 mg, Intravenous, Q4H PRN, Jimbo Montilla III, MD, 0.5 mg at 04/15/24 2107    insulin aspart U-100 pen 0-5 Units, 0-5 Units, Subcutaneous, QID (AC + HS) PRN, Jimbo Montilla III, MD, 1 Units at 04/14/24 2051    melatonin tablet 6 mg, 6 mg, Oral, Nightly PRN, Jimbo Montilla III, MD, 6 mg at 04/14/24 2052    naloxone 0.4 mg/mL injection 0.02 mg, 0.02 mg, Intravenous, PRNRodlan Kearny Q. III, MD    ondansetron injection 4 mg, 4 mg, Intravenous, Q6H PRN, Jimbo Montilla III, MD, 4 mg at 04/15/24 2107    oxyCODONE-acetaminophen  mg per tablet 1 tablet, 1 tablet, Oral, Q4H PRN, Jimbo Montilla III, MD, 1 tablet at 04/14/24 1841    prochlorperazine injection Soln 5 mg, 5 mg, Intravenous, Q6H PRN, Jimbo Montilla III, MD, 5 mg at 04/16/24 0137    sodium chloride 0.9% bolus 250 mL 250 mL, 250 mL, Intravenous, PRN, Jimbo Montilla III, MD    sodium chloride 0.9% bolus 250 mL 250 mL, 250 mL, Intravenous, PRN, Jimbo Montilla III, MD    tamsulosin 24 hr capsule 0.8 mg, 2 capsule, Oral, Daily, Jimbo Montilla III, MD, 0.8 mg at 04/16/24 0914    Pharmacy to dose Vancomycin consult, , , Once **AND** vancomycin - pharmacy to dose, , Intravenous, pharmacy to manage frequency, Melinda Casillas MD    vitamin renal formula (B-complex-vitamin c-folic acid) 1 mg per capsule 1 capsule, 1 capsule, Oral, Daily,  Jimbo Montilla III, MD, 1 capsule at 04/16/24 0915    Assessment and Plan:    68-year-old male with history of end-stage renal disease on dialysis presents with right leg infection. He underwent I&D of his right leg abscess on 04/04 and repeat I and D and replacement of wound VAC on 4/6 and 4/9.     Cultures revealed E coli, on cefepime.     Leukocytosis improving     Wound VAC removed by wound care.  Dressing changes started.  Images reveal some slough but no purulence.  Spoke with wound care.  Ankle is not felt to be in need of further surgery at this time.  If leukocytosis does not resolve consider other source of infection.     No surgery planned at this time but in next couple of months could need additional debridement or grafting of the wounds.    Plan follow-up Dr. Montilla after discharge for right ankle.    Jimbo Montilla MD  [facility]  [phone]  This note has been transcribed with voice recognition software, but not reviewed and may contain unrecognized errors.

## 2024-04-16 NOTE — SUBJECTIVE & OBJECTIVE
Interval History:  No acute overnight events.  Patient remained afebrile.  Alert and oriented x3 this morning.  Admits he feels fatigued.  Denies any pain in any of his extremities or joints at this time.  Admits weakness.    Review of Systems   Constitutional:  Positive for fatigue. Negative for chills and fever.   Musculoskeletal:  Negative for arthralgias, back pain and joint swelling.   Neurological:  Positive for weakness.   Psychiatric/Behavioral:  Negative for confusion and hallucinations.      Objective:     Vital Signs (Most Recent):  Temp: 98 °F (36.7 °C) (04/16/24 1228)  Pulse: 75 (04/16/24 1228)  Resp: 18 (04/16/24 1228)  BP: 134/60 (04/16/24 1228)  SpO2: 100 % (04/16/24 1300) Vital Signs (24h Range):  Temp:  [98 °F (36.7 °C)-99.5 °F (37.5 °C)] 98 °F (36.7 °C)  Pulse:  [66-79] 75  Resp:  [17-19] 18  SpO2:  [94 %-100 %] 100 %  BP: ()/(50-60) 134/60     Weight: 90.4 kg (199 lb 4.7 oz)  Body mass index is 30.3 kg/m².    Intake/Output Summary (Last 24 hours) at 4/16/2024 1502  Last data filed at 4/16/2024 0938  Gross per 24 hour   Intake 240 ml   Output --   Net 240 ml         Physical Exam  Vitals and nursing note reviewed.   Constitutional:       General: He is not in acute distress.     Appearance: He is obese. He is not ill-appearing.   HENT:      Mouth/Throat:      Mouth: Mucous membranes are moist.   Pulmonary:      Effort: Pulmonary effort is normal. No respiratory distress.   Abdominal:      General: There is no distension.      Palpations: Abdomen is soft.      Tenderness: There is no abdominal tenderness.   Musculoskeletal:         General: Swelling (RUE - stable) present. No tenderness.   Skin:     Comments: R ankle wound vac - minimal swelling present   Neurological:      Mental Status: He is alert.      Motor: Weakness (generalized weakness) present.             Significant Labs: All pertinent labs within the past 24 hours have been reviewed.    Significant Imaging: I have reviewed all  pertinent imaging results/findings within the past 24 hours.

## 2024-04-16 NOTE — ASSESSMENT & PLAN NOTE
I independently reviewed patient's lab work and images as documented. 68 year old man with ESRD on HD, DM and CVA with R sided weakness admitted on 3/21 for fatigue, hyperkalemia, found to have Afib with RVR. CT ankle from 3/28 suggestive of cellulitis, no evidence of osteomyelitis, pt had new drainage from R ankle and was found to have an abscess. S/p I&D 4/4 and 4/6 with OR cx with Ecoli. Operative noted on 4/6 notable for necrotic tissue around distal fibula. Interval improvement in fevers since surgery; however, has leukocytosis that is slowly improving. Of note, pt previously had MRSA infection of R ankle in 2021 - empiric MRSA coverage added. He has elevated liver enzymes - unclear etiology at this time, he is on multiple medications that can cause hepatotoxicity (amio, atorvastatin, abx, etc). Blcx so far unrevealing.     Recommendations  - stop ceftriaxone, switch to renal dosing ancef (ordered)  - continue vancomycin goal trough 15-20 mcg/ml, dosing per pharmacy   -anticipate 6w course of abx (ancef/vanc), ruben 5/20 if no further surgical intervention is planned  - follow up wound care/ortho recs, consider repeat CT of R ankle if concerns for infection on next wound vac exchange/dressing change    Outpatient Antibiotic Therapy Plan:    Please send referral to [facility].    1) Infection: Ecoli + presumed MRSA RLE OM    2) Discharge Antibiotics:    Intravenous antibiotics:  Vancomycin 500 mg after HD on HD days  Ancef 2g/2g/3g after HD on HD days      3) Therapy Duration:  6w    Estimated end date of IV antibiotics: 5/20/24    4) Outpatient Weekly Labs:    Order the following labs to be drawn on Mondays:   CBC  CMP   Vancomycin trough. Target 15-20    If discharged on vancomycin IV, order the following additional labs to be drawn on Thursdays:  CMP   Vancomycin trough. Target 15-20    If vancomycin trough is not at target (15-20) prior to discharge, schedule vancomycin  trough to be drawn before their fourth outpatient dose.    5) Fax Lab Results to Infectious Diseases Provider: [email or web address]    [facility] Fax Number: [phone]    6) Outpatient Infectious Diseases Follow-up    Follow-up appointment will be arranged by the ID clinic and will be found in the patient's appointments tab.    Prior to discharge, please ensure the patient's follow-up has been scheduled.    If there is still no follow-up scheduled prior to discharge, please send an EPIC message to Tamraa Brewster in Infectious Diseases.

## 2024-04-16 NOTE — PT/OT/SLP PROGRESS
Physical Therapy Treatment    Patient Name:  Miguel Moore   MRN:  [id number]    Recommendations:     Discharge Recommendations: Moderate Intensity Therapy  Discharge Equipment Recommendations: lift device, hospital bed  Barriers to discharge: (Pt is at high risk of falls, readmission and morbidity if d/c home at this time.)     Assessment:     Miguel Moore is a 68 y.o. male admitted with a medical diagnosis of Cellulitis of right foot.  He presents with the following impairments/functional limitations: weakness, impaired endurance, impaired self care skills, impaired functional mobility, gait instability, impaired cognition, impaired balance, decreased coordination, decreased upper extremity function, decreased lower extremity function, pain, decreased safety awareness, decreased ROM, impaired coordination, impaired fine motor, impaired cardiopulmonary response to activity, edema, orthopedic precautions, impaired skin.    Pt still requiring Max-Total A x2 persons for bed mobility at this time. Pt pleasant to work with, pt sat EOB ~25 minutes this date.    Rehab Prognosis: Fair; patient would benefit from acute skilled PT services to address these deficits and reach maximum level of function.    Recent Surgery: Procedure(s) (LRB):  INCISION AND DRAINAGE, LOWER EXTREMITY- FOOT & ANKLE (Right)  WOUND VAC EXCHANGE (Right) 7 Days Post-Op    Plan:     During this hospitalization, patient to be seen 3 x/week to address the identified rehab impairments via gait training, therapeutic activities, therapeutic exercises and progress toward the following goals:    Plan of Care Expires:  04/17/24    Subjective     Chief Complaint: tired  Patient/Family Comments/goals: Pt agreed to therapy  Pain/Comfort:  Pain Rating 1: 0/10      Objective:     Patient found HOB elevated with bed alarm, peripheral IV, telemetry, pressure relief boots, wound vac upon PT entry to room.     General Precautions: Standard, fall, diabetic,  respiratory  Orthopedic Precautions: N/A  Braces: N/A  Respiratory Status: Nasal cannula, flow 2 L/min     Functional Mobility:  Bed Mobility:     Rolling Right: Total assistance  Scooting: total assistance  Supine to Sit: total assistance and of 2 persons  Sit to Supine: total assistance and of 2 persons  Balance: Pt with poor sitting balance, pt with brief moments of sitting with SBA/CGA. Pt with lean posteriorly and to the R side. Pt required Max A to maintain sitting balance      AM-PAC 6 CLICK MOBILITY  Turning over in bed (including adjusting bedclothes, sheets and blankets)?: 2  Sitting down on and standing up from a chair with arms (e.g., wheelchair, bedside commode, etc.): 2  Moving from lying on back to sitting on the side of the bed?: 2  Moving to and from a bed to a chair (including a wheelchair)?: 2  Need to walk in hospital room?: 2  Climbing 3-5 steps with a railing?: 1  Basic Mobility Total Score: 11       Treatment & Education:  PROM to BLE: LAQ, hip flexion, hip abd/add 1x15 each    Patient left HOB elevated with all lines intact and call button in reach..    GOALS:   Multidisciplinary Problems       Physical Therapy Goals          Problem: Physical Therapy    Goal Priority Disciplines Outcome Goal Variances Interventions   Physical Therapy Goal     PT, PT/OT Ongoing, Not Progressing     Description: Goals to be met by:      Patient will increase functional independence with mobility by performin. Supine to sit with Modified [place]  2. Sit to supine with Modified [place]  3. Sit to stand transfer with Modified [place]  4. Gait  x 100 feet with Modified [place] using Quad Cane.    Recommend: Moderate Intensity Therapy at time of discharge.                             Time Tracking:     PT Received On: 24  PT Start Time: 939     PT Stop Time: 1010  PT Total Time (min): 31 min     Billable Minutes: Therapeutic Activity 16 and Therapeutic Exercise  15    Treatment Type: Treatment  PT/PTA: PTA     Number of PTA visits since last PT visit: 2     04/16/2024

## 2024-04-16 NOTE — ASSESSMENT & PLAN NOTE
R ankle pain, swelling, warmth present. Potential gout vs cellulitis. 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 No fracture present. Uric acid normal. Arterial US and venous US with no clots, appropriate flow    - initially on ceftriaxone/vanc for 7 days and still with pain and increased WBC and fevers  - blood cultures currently NGTD  - CT right ankle with no evidence of osteo.  - orthopedic surgery consulted.   4/4-Right ankle wound then had new abscess/discharge sp I& D with wound vac placement  4/6-Repeat OR I&D and wound vac back on. OP wound culture grew e coli.   04/9 patient had a repeat washout with I&D.  -Blood cx with no growth to date   -ID consulted: changed to Ancef (04/16) and continue on vancomycin for MRSA coverage. Patient will need total of 6 weeks of antibiotics.   -continue daily wound care.  -repeat blood cultures no growth  -trend WBC

## 2024-04-16 NOTE — ASSESSMENT & PLAN NOTE
PT/OT evaluation completed and recommended moderate intensity therapy  However, patient decline SNF. Would prefer to go home with home health on discharge   Case management will assist

## 2024-04-16 NOTE — PT/OT/SLP PROGRESS
Occupational Therapy   Treatment    Name: Miguel Moore  MRN: [id number]  Admitting Diagnosis:  Cellulitis of right foot  7 Days Post-Op    Recommendations:     Discharge Recommendations: Moderate Intensity Therapy  Discharge Equipment Recommendations:  lift device, hospital bed, to be determined by next level of care  Barriers to discharge:   (pt. is still requiring max to total assist x 2 people and has R sided pre-morbid weakness due to CVA, so high fall risk)    Assessment:     Miguel Moore is a 68 y.o. male with a medical diagnosis of Cellulitis of right foot.  He presents with HOB elevated and sidelying to left side with wedge. Performance deficits affecting function are weakness, impaired endurance, impaired sensation, impaired self care skills, impaired functional mobility, gait instability, impaired balance, impaired cognition, decreased coordination, decreased upper extremity function, decreased lower extremity function, decreased safety awareness, pain, decreased ROM, impaired coordination, impaired fine motor, impaired skin, edema, impaired cardiopulmonary response to activity, impaired joint extensibility, impaired muscle length, other (comment) (possible depression/poor motivation).     Rehab Prognosis:   Fair+ ; patient would benefit from acute skilled OT services to address these deficits and reach maximum level of function.       Plan:     Patient to be seen 5 x/week to address the above listed problems via self-care/home management, therapeutic activities, therapeutic exercises  Plan of Care Expires: 04/29/24  Plan of Care Reviewed with: patient    Subjective     Chief Complaint: right arm movement causes pain due to old CVA with flexor synergy pattern   Patient/Family Comments/goals: patient agreed to treatment today   Pain/Comfort:  Pain Rating 1: other (see comments) (not rated, but has pain with R arm movement)  Location - Side 1: Left  Location 1: arm  Pain Addressed 1: Reposition,  Distraction, Cessation of Activity, Nurse notified    Objective:     Communicated with: RNLizzy, prior to session.  Patient found HOB elevated with bed alarm, peripheral IV, telemetry, pressure relief boots, wound vac upon OT entry to room.    General Precautions: Standard, fall, diabetic    Orthopedic Precautions:N/A  Braces: N/A  Respiratory Status: Room air     Occupational Performance:     Bed Mobility:    Patient completed Rolling/Turning to Left with  total assistance and 2  persons  Patient completed Rolling/Turning to Right with total assistance and 2  persons  Patient completed Scooting/Bridging with maximal assistance and 2 persons to EOB and scooting to HOB with total assistance x 2 people   Patient completed Supine to Sit with maximal assistance and 2  persons  Patient completed Sit to Supine with maximal assistance and 2  persons     Functional Mobility/Transfers:  Patient sat EOB  x max assist x 2 people for 15 min. With need for sitting next to him on bed to provide trunk support or behind him to provide support.     Activities of Daily Living:  Grooming: moderate assistance seated EOB to brush teeth with occupational therapy holding spit basin and washing utensils  Upper Body Dressing: total assistance to don outer gown   Lower Body Dressing: total assistance to don right sock and adjust socks       [facility] 6 Click ADL: 11    Treatment & Education:  Occupational therapy educated patient re: trunk control using left upper extremity farther away from body due to pushing syndrome and lateral leaning to right side with loss of control to right side to max assist x 2 people.   Occupational therapy also educated patient re: segmental reduction of tone while seated with right and left twisting seated EOB with max assist x 2 people.     Patient left HOB elevated and right sidelying with wedge placed behind back with all lines intact, call button in reach, bed alarm on, and RN  notified    GOALS:    Multidisciplinary Problems       Occupational Therapy Goals          Problem: Occupational Therapy    Goal Priority Disciplines Outcome Interventions   Occupational Therapy Goal     OT, PT/OT Ongoing, Progressing    Description: Goals to be met by: 4/15/24     Patient will increase functional independence with ADLs by performing:    Grooming while seated at EOB w/ seated with Supervision.  Toileting from bedside commode with Min A for hygiene and clothing management.   Rolling to Bilateral with SBA.   Supine to sit with SBA.  Sit to stand with Min A.   Sitting EOB for >25 min w/ SBA for sitting balance.   Step transfer: TBD   Toilet transfer: TBD  Upper extremity exercise program x15 reps per handout, with independence.                         Time Tracking:     OT Date of Treatment: 04/16/24  OT Start Time: 0939  OT Stop Time: 1012  OT Total Time (min): 33 min    Billable Minutes:Self Care/Home Management 15   Therapeutic Exercise 18     OT/GENEVA: OT        Co-treat with PTA   4/16/2024

## 2024-04-16 NOTE — ASSESSMENT & PLAN NOTE
Continue holding as patient may need repeat procedure.  Heparin gtt for now   -restart apixaban once procedures done.

## 2024-04-17 DIAGNOSIS — R79.89 ELEVATED LFTS: Primary | ICD-10-CM

## 2024-04-17 LAB
ALBUMIN SERPL BCP-MCNC: 1.4 G/DL (ref 3.5–5.2)
ALP SERPL-CCNC: 71 U/L (ref 55–135)
ALT SERPL W/O P-5'-P-CCNC: 126 U/L (ref 10–44)
ANION GAP SERPL CALC-SCNC: 13 MMOL/L (ref 8–16)
APTT PPP: 28.1 SEC (ref 21–32)
APTT PPP: 43.7 SEC (ref 21–32)
APTT PPP: 44.1 SEC (ref 21–32)
AST SERPL-CCNC: 142 U/L (ref 10–40)
BACTERIA BLD CULT: NORMAL
BASOPHILS # BLD AUTO: 0.07 K/UL (ref 0–0.2)
BASOPHILS # BLD AUTO: 0.07 K/UL (ref 0–0.2)
BASOPHILS NFR BLD: 0.6 % (ref 0–1.9)
BASOPHILS NFR BLD: 0.6 % (ref 0–1.9)
BILIRUB SERPL-MCNC: 0.4 MG/DL (ref 0.1–1)
BUN SERPL-MCNC: 60 MG/DL (ref 8–23)
CALCIUM SERPL-MCNC: 7.5 MG/DL (ref 8.7–10.5)
CHLORIDE SERPL-SCNC: 97 MMOL/L (ref 95–110)
CO2 SERPL-SCNC: 24 MMOL/L (ref 23–29)
CREAT SERPL-MCNC: 7.7 MG/DL (ref 0.5–1.4)
DIFFERENTIAL METHOD BLD: ABNORMAL
DIFFERENTIAL METHOD BLD: ABNORMAL
EOSINOPHIL # BLD AUTO: 0.1 K/UL (ref 0–0.5)
EOSINOPHIL # BLD AUTO: 0.1 K/UL (ref 0–0.5)
EOSINOPHIL NFR BLD: 1.2 % (ref 0–8)
EOSINOPHIL NFR BLD: 1.2 % (ref 0–8)
ERYTHROCYTE [DISTWIDTH] IN BLOOD BY AUTOMATED COUNT: 19.6 % (ref 11.5–14.5)
ERYTHROCYTE [DISTWIDTH] IN BLOOD BY AUTOMATED COUNT: 19.6 % (ref 11.5–14.5)
EST. GFR  (NO RACE VARIABLE): 7 ML/MIN/1.73 M^2
GLUCOSE SERPL-MCNC: 156 MG/DL (ref 70–110)
HCT VFR BLD AUTO: 25.3 % (ref 40–54)
HCT VFR BLD AUTO: 25.3 % (ref 40–54)
HGB BLD-MCNC: 8 G/DL (ref 14–18)
HGB BLD-MCNC: 8 G/DL (ref 14–18)
IMM GRANULOCYTES # BLD AUTO: 0.2 K/UL (ref 0–0.04)
IMM GRANULOCYTES # BLD AUTO: 0.2 K/UL (ref 0–0.04)
IMM GRANULOCYTES NFR BLD AUTO: 1.8 % (ref 0–0.5)
IMM GRANULOCYTES NFR BLD AUTO: 1.8 % (ref 0–0.5)
LYMPHOCYTES # BLD AUTO: 1.1 K/UL (ref 1–4.8)
LYMPHOCYTES # BLD AUTO: 1.1 K/UL (ref 1–4.8)
LYMPHOCYTES NFR BLD: 9.5 % (ref 18–48)
LYMPHOCYTES NFR BLD: 9.5 % (ref 18–48)
MCH RBC QN AUTO: 26.8 PG (ref 27–31)
MCH RBC QN AUTO: 26.8 PG (ref 27–31)
MCHC RBC AUTO-ENTMCNC: 31.6 G/DL (ref 32–36)
MCHC RBC AUTO-ENTMCNC: 31.6 G/DL (ref 32–36)
MCV RBC AUTO: 85 FL (ref 82–98)
MCV RBC AUTO: 85 FL (ref 82–98)
MONOCYTES # BLD AUTO: 0.6 K/UL (ref 0.3–1)
MONOCYTES # BLD AUTO: 0.6 K/UL (ref 0.3–1)
MONOCYTES NFR BLD: 5.1 % (ref 4–15)
MONOCYTES NFR BLD: 5.1 % (ref 4–15)
NEUTROPHILS # BLD AUTO: 9.2 K/UL (ref 1.8–7.7)
NEUTROPHILS # BLD AUTO: 9.2 K/UL (ref 1.8–7.7)
NEUTROPHILS NFR BLD: 81.8 % (ref 38–73)
NEUTROPHILS NFR BLD: 81.8 % (ref 38–73)
NRBC BLD-RTO: 0 /100 WBC
NRBC BLD-RTO: 0 /100 WBC
PLATELET # BLD AUTO: 339 K/UL (ref 150–450)
PLATELET # BLD AUTO: 339 K/UL (ref 150–450)
PMV BLD AUTO: 9.4 FL (ref 9.2–12.9)
PMV BLD AUTO: 9.4 FL (ref 9.2–12.9)
POCT GLUCOSE: 149 MG/DL (ref 70–110)
POCT GLUCOSE: 164 MG/DL (ref 70–110)
POCT GLUCOSE: 174 MG/DL (ref 70–110)
POCT GLUCOSE: 207 MG/DL (ref 70–110)
POTASSIUM SERPL-SCNC: 4.9 MMOL/L (ref 3.5–5.1)
PROT SERPL-MCNC: 4.6 G/DL (ref 6–8.4)
RBC # BLD AUTO: 2.98 M/UL (ref 4.6–6.2)
RBC # BLD AUTO: 2.98 M/UL (ref 4.6–6.2)
SODIUM SERPL-SCNC: 134 MMOL/L (ref 136–145)
VANCOMYCIN SERPL-MCNC: 13 UG/ML
WBC # BLD AUTO: 11.26 K/UL (ref 3.9–12.7)
WBC # BLD AUTO: 11.26 K/UL (ref 3.9–12.7)

## 2024-04-17 PROCEDURE — 25000003 PHARM REV CODE 250: Mod: HCNC | Performed by: ORTHOPAEDIC SURGERY

## 2024-04-17 PROCEDURE — 63600175 PHARM REV CODE 636 W HCPCS: Mod: HCNC | Performed by: STUDENT IN AN ORGANIZED HEALTH CARE EDUCATION/TRAINING PROGRAM

## 2024-04-17 PROCEDURE — 21400001 HC TELEMETRY ROOM: Mod: HCNC

## 2024-04-17 PROCEDURE — 85025 COMPLETE CBC W/AUTO DIFF WBC: CPT | Mod: HCNC | Performed by: ORTHOPAEDIC SURGERY

## 2024-04-17 PROCEDURE — 36415 COLL VENOUS BLD VENIPUNCTURE: CPT | Mod: HCNC | Performed by: INTERNAL MEDICINE

## 2024-04-17 PROCEDURE — 85730 THROMBOPLASTIN TIME PARTIAL: CPT | Mod: 91,HCNC | Performed by: STUDENT IN AN ORGANIZED HEALTH CARE EDUCATION/TRAINING PROGRAM

## 2024-04-17 PROCEDURE — 80202 ASSAY OF VANCOMYCIN: CPT | Mod: HCNC | Performed by: INTERNAL MEDICINE

## 2024-04-17 PROCEDURE — 63600175 PHARM REV CODE 636 W HCPCS: Mod: JZ,JG,HCNC | Performed by: INTERNAL MEDICINE

## 2024-04-17 PROCEDURE — 25000003 PHARM REV CODE 250: Mod: HCNC | Performed by: STUDENT IN AN ORGANIZED HEALTH CARE EDUCATION/TRAINING PROGRAM

## 2024-04-17 PROCEDURE — 63600175 PHARM REV CODE 636 W HCPCS: Mod: HCNC | Performed by: ORTHOPAEDIC SURGERY

## 2024-04-17 PROCEDURE — 36415 COLL VENOUS BLD VENIPUNCTURE: CPT | Mod: HCNC,XB | Performed by: STUDENT IN AN ORGANIZED HEALTH CARE EDUCATION/TRAINING PROGRAM

## 2024-04-17 PROCEDURE — 99233 SBSQ HOSP IP/OBS HIGH 50: CPT | Mod: HCNC,,, | Performed by: STUDENT IN AN ORGANIZED HEALTH CARE EDUCATION/TRAINING PROGRAM

## 2024-04-17 PROCEDURE — 80100016 HC MAINTENANCE HEMODIALYSIS: Mod: HCNC

## 2024-04-17 PROCEDURE — 25000003 PHARM REV CODE 250: Mod: HCNC | Performed by: INTERNAL MEDICINE

## 2024-04-17 PROCEDURE — 80053 COMPREHEN METABOLIC PANEL: CPT | Mod: HCNC | Performed by: ORTHOPAEDIC SURGERY

## 2024-04-17 RX ADMIN — OXYCODONE AND ACETAMINOPHEN 1 TABLET: 10; 325 TABLET ORAL at 09:04

## 2024-04-17 RX ADMIN — HEPARIN SODIUM 17 UNITS/KG/HR: 10000 INJECTION, SOLUTION INTRAVENOUS at 09:04

## 2024-04-17 RX ADMIN — INSULIN ASPART 1 UNITS: 100 INJECTION, SOLUTION INTRAVENOUS; SUBCUTANEOUS at 09:04

## 2024-04-17 RX ADMIN — TAMSULOSIN HYDROCHLORIDE 0.8 MG: 0.4 CAPSULE ORAL at 08:04

## 2024-04-17 RX ADMIN — VANCOMYCIN HYDROCHLORIDE 750 MG: 750 INJECTION, POWDER, LYOPHILIZED, FOR SOLUTION INTRAVENOUS at 04:04

## 2024-04-17 RX ADMIN — Medication 324 MG: at 08:04

## 2024-04-17 RX ADMIN — EPOETIN ALFA-EPBX 10000 UNITS: 10000 INJECTION, SOLUTION INTRAVENOUS; SUBCUTANEOUS at 08:04

## 2024-04-17 RX ADMIN — HEPARIN SODIUM 14 UNITS/KG/HR: 10000 INJECTION, SOLUTION INTRAVENOUS at 03:04

## 2024-04-17 RX ADMIN — ONDANSETRON 4 MG: 2 INJECTION INTRAMUSCULAR; INTRAVENOUS at 07:04

## 2024-04-17 RX ADMIN — NEPHROCAP 1 CAPSULE: 1 CAP ORAL at 08:04

## 2024-04-17 RX ADMIN — Medication 324 MG: at 04:04

## 2024-04-17 RX ADMIN — DEXTROSE MONOHYDRATE 1 G: 2.5 INJECTION INTRAVENOUS at 08:04

## 2024-04-17 RX ADMIN — FINASTERIDE 5 MG: 5 TABLET, FILM COATED ORAL at 08:04

## 2024-04-17 NOTE — ASSESSMENT & PLAN NOTE
-continue wound care and wound VAC management.  -patient on IV antibiotics cefepime 1 g Q 24 hours  -wound culture grew E coli.  -patient was followed by ID and Orthopedics, input appreciated.  -trend WBC  -repeat blood culture, no growth  -leukocytosis resolved on 04/17/2024

## 2024-04-17 NOTE — PROGRESS NOTES
[facility] Medicine  Progress Note    Patient Name: Miguel Moore  MRN: [id number]  Patient Class: IP- Inpatient   Admission Date: 3/20/2024  Length of Stay: 28 days  Attending Physician: Pepe Martino DO  Primary Care Provider: Raciel Raymond MD        Subjective:     Principal Problem:Cellulitis of right foot        HPI:  Mr Miguel Moore is a 68 y.o. man with ESRD on HD, HTN, DM with neuropathy, history of CVA with residual R sided weakness, chronic DVT on eliquis who presented with feeling poorly. He attended his usual dialysis session on Monday 3/18 without issues. He developed fatigue and R ankle swelling. He has some pain with palpation but is able to walk. No reports of trauma. No wounds. No falls. No history of gout. Today he was feeling worse so did not go to dialysis and came to the ED. No fevers. No shortness of breath. Normal appetite. No nausea, vomiting. No chest pain. No palpitations.     In the ED, afebrile with HR initially controlled then to 130s Afib RVR. Started diltiazem but became hypotensive. Diltiazem stopped. Given antibiotics. Admitted for further management.     Overview/Hospital Course:   68 year old man with ESRD on HD, DM and CVA with R sided weakness admitted on 3/21 for fatigue, hyperkalemia, found to have Afib with RVR. Started amio gtt with conversion to NSR. TTE EF 55-60%, mild LAE. Cardiology consulted. Transitioned on PO amiodarone and continues home eliquis (on this for chronic DVT). He received HD with resolution of hyperkalemia. Transferred out the ICU on 03/26. CT ankle from 3/28 suggestive of cellulitis, no evidence of osteomyelitis, pt had new drainage from R ankle and was found to have an abscess. Orthopedic surgery consulted. Pt S/p I&D 4/4 and 4/6 with OR cx with Ecoli. Operative noted on 4/6 notable for necrotic tissue around distal fibula. Interval improvement in fevers since surgery; however, has leukocytosis that is slowly improving. ID  consulted-empiric MRSA coverage added given hx of MRSA. Orthopedic discontinued wound vac on 04/16. PT/OT recommends moderate intensity therapy, but patient declines SNF. Will need home health on discharge. He has elevated liver enzymes - unclear etiology at this time, he is on multiple medications that can cause hepatotoxicity (amio, atorvastatin, abx, etc). Hepatitis panel nonreactive. US abdomen with Hepatosplenomegaly. Blcx so far unrevealing. GI consulted. Amiodarone and statin held.     Interval History:  No acute overnight events.  Patient remained afebrile.  Alert and oriented x3 this morning.  Admits he feels fatigued but better today. Clinically appears better this morning.  Denies any pain in any of his extremities or joints at this time.  Admits weakness.    Review of Systems   Constitutional:  Positive for fatigue. Negative for chills and fever.   Musculoskeletal:  Negative for arthralgias, back pain and joint swelling.   Neurological:  Positive for weakness.   Psychiatric/Behavioral:  Negative for confusion and hallucinations.      Objective:     Vital Signs (Most Recent):  Temp: 98 °F (36.7 °C) (04/17/24 0701)  Pulse: 75 (04/17/24 1000)  Resp: 18 (04/17/24 0701)  BP: (!) 120/57 (04/17/24 0701)  SpO2: (!) 94 % (04/17/24 0701) Vital Signs (24h Range):  Temp:  [98 °F (36.7 °C)-99 °F (37.2 °C)] 98 °F (36.7 °C)  Pulse:  [64-75] 75  Resp:  [18] 18  SpO2:  [94 %-100 %] 94 %  BP: (116-138)/(56-64) 120/57     Weight: 90.4 kg (199 lb 4.7 oz)  Body mass index is 30.3 kg/m².    Intake/Output Summary (Last 24 hours) at 4/17/2024 1317  Last data filed at 4/17/2024 0916  Gross per 24 hour   Intake 806.31 ml   Output --   Net 806.31 ml         Physical Exam  Vitals and nursing note reviewed.   Constitutional:       General: He is not in acute distress.     Appearance: He is obese. He is not ill-appearing.   HENT:      Mouth/Throat:      Mouth: Mucous membranes are moist.   Pulmonary:      Effort: Pulmonary effort is  normal. No respiratory distress.   Abdominal:      General: There is no distension.      Palpations: Abdomen is soft.      Tenderness: There is no abdominal tenderness.   Musculoskeletal:         General: Swelling (RUE - stable) present. No tenderness.   Skin:     Comments: R ankle with minimal swelling present. Wound vac removed on 04/16   Neurological:      Mental Status: He is alert.      Motor: Weakness (generalized weakness) present.             Significant Labs: All pertinent labs within the past 24 hours have been reviewed.    Significant Imaging: I have reviewed all pertinent imaging results/findings within the past 24 hours.    Assessment/Plan:      * Cellulitis/Abscess  of right foot/ankle  R ankle pain, swelling, warmth present. Potential gout vs cellulitis. 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 No fracture present. Uric acid normal. Arterial US and venous US with no clots, appropriate flow    - initially on ceftriaxone/vanc for 7 days and still with pain and increased WBC and fevers  - blood cultures currently NGTD  - CT right ankle with no evidence of osteo.  - orthopedic surgery consulted.   4/4-Right ankle wound then had new abscess/discharge sp I& D with wound vac placement  4/6-Repeat OR I&D and wound vac back on. OP wound culture grew e coli.   04/9 patient had a repeat washout with I&D.  -Blood cx with no growth to date   -ID consulted: changed to Ancef (04/16) and continue on vancomycin for MRSA coverage. Patient will need total of 6 weeks of antibiotics.  (JJ 5/20/24)  -continue daily wound care.  -repeat blood cultures no growth  -trend WBC      Paroxysmal atrial fibrillation with RVR  Patient has Paroxysmal (<7 days) atrial fibrillation which is uncontrolled. Patient is currently in atrial fibrillation.  - new onset Afib  - on labetalol for BP at home, eliquis for chronic DVT but did miss some doses of eliquis  - in ED given diltiazem gtt but that caused  hypotension  - EKG with Afib RVR with normal Qtc  - started amiodarone with conversion to NSR. Changed to PO amiodarone  - however, he had recurrent Afib and was placed back on amio gtt.   - Cardiology consulted: given worsening LFTs, okay to stop amiodarone and monitor (04/16/24)  - plan for SONY/DCCV on 3/22/24 but converted to sinus rhythm. Has been in/out of afib but rate controlled  - Hold Eliquis for I&D and possible future procedures at this time.    - On heparin gtt  - Restart eliquis once done with procedures.    Open wound  -continue wound care and wound VAC management.  -patient on IV antibiotics cefepime 1 g Q 24 hours  -wound culture grew E coli.  -patient was followed by ID and Orthopedics, input appreciated.  -trend WBC  -repeat blood culture, no growth  -leukocytosis resolved on 04/17/2024        Elevated liver enzymes  -elevated liver enzymes, worsening on 04/16   - unclear etiology at this time. However, he is on multiple medications that can cause hepatotoxicity (amio, atorvastatin, abx, etc).   -Hepatitis panel ordered.   -US abdomen with Hepatosplenomegaly.   -Discussed with cardiology, okay to stop PO amiodarone and monitor at this time (04/16/24)  -Will hold statin   -GI consulted.    ESRD (end stage renal disease)  ESRD via LUE AVF. Last session 3/18/24. Missed 3/20/24 due to fatigue. Usually MWF  - hyperkalemic on admit. Does not appear volume overloaded.   - Nephrology consulted.  - continue MWF HD    Anemia in chronic kidney disease  Patient's anemia is currently controlled. Has not received any PRBCs to date.   Lab Results   Component Value Date    HGB 8.0 (L) 04/17/2024    HGB 8.0 (L) 04/17/2024    HCT 25.3 (L) 04/17/2024    HCT 25.3 (L) 04/17/2024     Anemia of CKD plus anemia of acute blood loss as expected from surgical procedures.  -Transfused 2 units of blood with adequate correction. (Transfused 1U on 04/06, and one unit on 04/14)  -patient receiving Epogen and iron  supplement.  -Continue to monitor, transfuse for hemoglobin less than 7    Secondary hyperparathyroidism of renal origin  Continue renvela       Benign essential HTN  Chronic.   Hold home amlodipine and labetolol given low-normal BP  Avoid hypotension     Latest blood pressure and vitals reviewed-     Temp:  [98 °F (36.7 °C)-99 °F (37.2 °C)]   Pulse:  [64-75]   Resp:  [18]   BP: (116-138)/(56-64)   SpO2:  [94 %-100 %] .   Home meds for hypertension were reviewed and noted below.   Hypertension Medications               amLODIPine (NORVASC) 10 MG tablet Take 1 tablet by mouth once daily    labetaloL (NORMODYNE) 100 MG tablet Take 1 tablet (100 mg total) by mouth once daily.            Will utilize p.r.n. blood pressure medication only if patient's blood pressure greater than 180/110 and he develops symptoms such as worsening chest pain or shortness of breath.    Dyslipidemia  Holding statin in setting of elevated LFTs      Controlled type 2 diabetes mellitus with chronic kidney disease on chronic dialysis, with long-term current use of insulin  A1c:   Lab Results   Component Value Date    HGBA1C 5.8 (H) 03/20/2024     Meds: SSI PRN to maintain goal 140-180  ADA renal diet, accuchecks, hypoglycemic protocol      Hemiplegia and hemiparesis following cerebral infarction affecting right dominant side  No signs of acute stroke. Does have RUE weakness.   - continue home asa, statin  - resume apixaban once done with procedures.      History of stroke  PT/OT evaluation completed and recommended moderate intensity therapy  However, patient decline SNF. Would prefer to go home with home health on discharge   Case management will assist    Seizure disorder as sequela of cerebrovascular accident  Not currently on antiepileptics.   No seizure activity reported.   Seizure precautions   Monitor.       Chronic deep vein thrombosis (DVT) of popliteal vein of right lower extremity 9/21/20 outside US; unprovoked;  anticoagulation  Continue holding as patient may need repeat procedure.  Heparin gtt for now   -restart apixaban once procedures done.      Class 1 obesity due to excess calories with serious comorbidity and body mass index (BMI) of 30.0 to 30.9 in adult  Body mass index is 30.3 kg/m². Morbid obesity complicates all aspects of disease management from diagnostic modalities to treatment. Weight loss encouraged and health benefits explained to patient.           VTE Risk Mitigation (From admission, onward)           Ordered     heparin 25,000 units in dextrose 5% (100 units/ml) IV bolus from bag LOW INTENSITY nomogram - [facility]  As needed (PRN)        Question:  Heparin Infusion Adjustment (DO NOT MODIFY ANSWER)  Answer:  \\[email or web address]\[email or web address]\Images\Pharmacy\HeparinInfusions\heparin LOW INTENSITY nomogram for [facility] [email or web address]    04/16/24 0805     heparin 25,000 units in dextrose 5% (100 units/ml) IV bolus from bag LOW INTENSITY nomogram - [facility]  As needed (PRN)        Question:  Heparin Infusion Adjustment (DO NOT MODIFY ANSWER)  Answer:  \\[email or web address]\[email or web address]\Images\Pharmacy\HeparinInfusions\heparin LOW INTENSITY nomogram for [facility] [id number]    04/16/24 0805     heparin 25,000 units in dextrose 5% 250 mL (100 units/mL) infusion LOW INTENSITY nomogram - [facility]  Continuous        Question:  Begin at (units/kg/hr)  Answer:  12    04/16/24 0805                    Discharge Planning   GARY:      Code Status: Full Code   Is the patient medically ready for discharge?:     Reason for patient still in hospital (select all that apply): Patient trending condition, Laboratory test, Treatment, and Consult recommendations  Discharge Plan A: Home with family, Home Health   Discharge Delays: (!) Procedure Scheduling (IR, OR, Labs, Echo, Cath, Echo, EEG)              Pepe Martino DO  Department of Hospital Medicine   [facility]

## 2024-04-17 NOTE — PROGRESS NOTES
Awake alert oriented NAD    Denies cns ent cp gi gu rheum sx    Tells me that his dressing on his leg was taken off but he still has his wound vac   Past Medical History:   Diagnosis Date    Allergy     Clotting disorder     Elaquis and APlavix    Deep vein thrombosis     Diabetes mellitus, type 2     Hypertension     Nuclear sclerosis of both eyes 6/9/2017    Renal disorder     Seizures     Stroke     Urinary tract infection      Past Surgical History:   Procedure Laterality Date    CATARACT EXTRACTION W/  INTRAOCULAR LENS IMPLANT Right 10/05/2017    Dr. Tay    CATARACT EXTRACTION W/  INTRAOCULAR LENS IMPLANT Left 10/19/2017    Dr. Tay    CYSTOSCOPY W/ RETROGRADES Bilateral 2/18/2021    Procedure: CYSTOSCOPY, WITH RETROGRADE PYELOGRAM;  Surgeon: JEMMA Styles MD;  Location: [facility] OR;  Service: Urology;  Laterality: Bilateral;  REQUESTING EARLY AS POSSIBE-LO / 2/8/2021 @ 1:13PM  RN Pre Op 2-11-21, Covid NEGATIVE ON  2-17-21.  C A    ESOPHAGOGASTRODUODENOSCOPY N/A 8/17/2020    Procedure: EGD (ESOPHAGOGASTRODUODENOSCOPY);  Surgeon: Desmond Chapman MD;  Location: [facility] ENDO;  Service: Endoscopy;  Laterality: N/A;    EYE SURGERY Bilateral     cataract    FEMORAL ARTERY STENT      FRACTURE SURGERY      INCISION AND DRAINAGE, LOWER EXTREMITY Right 4/4/2024    Procedure: INCISION AND DRAINAGE, LOWER EXTREMITY;  Surgeon: Jimbo Montilla III, MD;  Location: [facility] OR;  Service: Orthopedics;  Laterality: Right;    INCISION AND DRAINAGE, LOWER EXTREMITY Right 4/6/2024    Procedure: INCISION AND DRAINAGE, LOWER EXTREMITY;  Surgeon: Desmond Barillas MD;  Location: [facility] OR;  Service: Orthopedics;  Laterality: Right;  foot and ankle    INCISION AND DRAINAGE, LOWER EXTREMITY Right 4/9/2024    Procedure: INCISION AND DRAINAGE, LOWER EXTREMITY- FOOT & ANKLE;  Surgeon: Jimbo Montilla III, MD;  Location: [facility] OR;  Service: Orthopedics;  Laterality: Right;  CULTURES, VASCHE    KNEE SURGERY      bilateral scope     WOUND DRESSING Right 4/9/2024    Procedure: WOUND VAC EXCHANGE;  Surgeon: Jimbo Montilla III, MD;  Location: [facility] OR;  Service: Orthopedics;  Laterality: Right;     Review of patient's allergies indicates:   Allergen Reactions    Ace inhibitors      Hyperkalemia 8/2018  Other reaction(s): Unknown    Penicillins Hives     Tolerated cefepime and cefdinir previously    Tizanidine      Felt hot       Current Facility-Administered Medications   Medication Dose Route Frequency Provider Last Rate Last Admin    0.9%  NaCl infusion (for blood administration)   Intravenous Q24H PRN Hipolito Mooney MD        0.9%  NaCl infusion   Intravenous Once Jimbo Montilla III, MD        acetaminophen tablet 650 mg  650 mg Oral Q6H PRN Jimbo Montilla III, MD        ceFAZolin (ANCEF) 1 g in dextrose 5 % in water (D5W) 50 mL IVPB (MB+)  1 g Intravenous Daily Laney Underwood MD   Stopped at 04/17/24 0823    dextrose 10% bolus 125 mL 125 mL  12.5 g Intravenous PRN Jimbo Montilla III, MD        dextrose 10% bolus 250 mL 250 mL  25 g Intravenous PRN Jimbo Montilla III, MD        diphenoxylate-atropine 2.5-0.025 mg per tablet 1 tablet  1 tablet Oral Q6H PRN Jimbo Montilla III, MD   1 tablet at 04/02/24 2334    epoetin marisol-epbx injection 10,000 Units  10,000 Units Subcutaneous Every Mon, Wed, Fri Karen Tamez MD   10,000 Units at 04/17/24 0816    ferrous gluconate tablet 324 mg  324 mg Oral BID  Karen Tamez MD   324 mg at 04/17/24 0816    finasteride tablet 5 mg  5 mg Oral Daily Jimbo Montilla III, MD   5 mg at 04/17/24 0816    glucagon (human recombinant) injection 1 mg  1 mg Intramuscular PRN Jimbo Montilla III, MD        glucose chewable tablet 16 g  16 g Oral PRN Jimbo Montilla III, MD        glucose chewable tablet 24 g  24 g Oral PRN Jimbo Montilla III, MD        heparin 25,000 units in dextrose 5% (100 units/ml) IV bolus from bag LOW INTENSITY nomogram - [facility]  60 Units/kg (Adjusted) Intravenous PRN  Nusrat MartinoJean PaulCarolyn YARI., DO        heparin 25,000 units in dextrose 5% (100 units/ml) IV bolus from bag LOW INTENSITY nomogram - [facility]  30 Units/kg (Adjusted) Intravenous PRN Pepe MartinoDaniel, DO   2,310 Units at 04/16/24 1617    heparin 25,000 units in dextrose 5% 250 mL (100 units/mL) infusion LOW INTENSITY nomogram - [facility]  0-40 Units/kg/hr (Adjusted) Intravenous Continuous Pepe Martino., DO   Paused at 04/17/24 0604    HYDROmorphone injection 0.5 mg  0.5 mg Intravenous Q4H PRN Jimbo Montilla III, MD   0.5 mg at 04/15/24 2107    insulin aspart U-100 pen 0-5 Units  0-5 Units Subcutaneous QID (AC + HS) PRN Jimbo Montilla III, MD   2 Units at 04/16/24 1724    melatonin tablet 6 mg  6 mg Oral Nightly PRN Jimbo Monitlla III, MD   6 mg at 04/14/24 2052    naloxone 0.4 mg/mL injection 0.02 mg  0.02 mg Intravenous PRN Jimbo Montilla III, MD        ondansetron injection 4 mg  4 mg Intravenous Q6H PRN Jimbo Montilla III, MD   4 mg at 04/17/24 0745    oxyCODONE-acetaminophen  mg per tablet 1 tablet  1 tablet Oral Q4H PRN Jimbo Montilla III, MD   1 tablet at 04/14/24 1841    prochlorperazine injection Soln 5 mg  5 mg Intravenous Q6H PRN Jimbo Montilla III, MD   5 mg at 04/16/24 0137    sodium chloride 0.9% bolus 250 mL 250 mL  250 mL Intravenous PRN Jimbo Montilla III, MD        sodium chloride 0.9% bolus 250 mL 250 mL  250 mL Intravenous PRN Jimbo Montilla III, MD        tamsulosin 24 hr capsule 0.8 mg  2 capsule Oral Daily Jimbo Montilla III, MD   0.8 mg at 04/17/24 0816    vancomycin - pharmacy to dose   Intravenous pharmacy to manage frequency Melinda Casillas MD        vitamin renal formula (B-complex-vitamin c-folic acid) 1 mg per capsule 1 capsule  1 capsule Oral Daily Jimbo Montilla III, MD   1 capsule at 04/17/24 0816       LABS    Recent Results (from the past 24 hour(s))   POCT glucose    Collection Time: 04/16/24 12:25 PM   Result Value Ref Range    POCT Glucose 137 (H) 70 - 110  mg/dL   APTT    Collection Time: 04/16/24  3:40 PM   Result Value Ref Range    aPTT 34.1 (H) 21.0 - 32.0 sec   POCT glucose    Collection Time: 04/16/24  4:50 PM   Result Value Ref Range    POCT Glucose 204 (H) 70 - 110 mg/dL   POCT glucose    Collection Time: 04/16/24  7:26 PM   Result Value Ref Range    POCT Glucose 176 (H) 70 - 110 mg/dL   APTT    Collection Time: 04/16/24 10:46 PM   Result Value Ref Range    aPTT 40.8 (H) 21.0 - 32.0 sec   CBC Auto Differential    Collection Time: 04/17/24  6:45 AM   Result Value Ref Range    WBC 11.26 3.90 - 12.70 K/uL    RBC 2.98 (L) 4.60 - 6.20 M/uL    Hemoglobin 8.0 (L) 14.0 - 18.0 g/dL    Hematocrit 25.3 (L) 40.0 - 54.0 %    MCV 85 82 - 98 fL    MCH 26.8 (L) 27.0 - 31.0 pg    MCHC 31.6 (L) 32.0 - 36.0 g/dL    RDW 19.6 (H) 11.5 - 14.5 %    Platelets 339 150 - 450 K/uL    MPV 9.4 9.2 - 12.9 fL    Immature Granulocytes 1.8 (H) 0.0 - 0.5 %    Gran # (ANC) 9.2 (H) 1.8 - 7.7 K/uL    Immature Grans (Abs) 0.20 (H) 0.00 - 0.04 K/uL    Lymph # 1.1 1.0 - 4.8 K/uL    Mono # 0.6 0.3 - 1.0 K/uL    Eos # 0.1 0.0 - 0.5 K/uL    Baso # 0.07 0.00 - 0.20 K/uL    nRBC 0 0 /100 WBC    Gran % 81.8 (H) 38.0 - 73.0 %    Lymph % 9.5 (L) 18.0 - 48.0 %    Mono % 5.1 4.0 - 15.0 %    Eosinophil % 1.2 0.0 - 8.0 %    Basophil % 0.6 0.0 - 1.9 %    Differential Method Automated    CBC auto differential    Collection Time: 04/17/24  6:45 AM   Result Value Ref Range    WBC 11.26 3.90 - 12.70 K/uL    RBC 2.98 (L) 4.60 - 6.20 M/uL    Hemoglobin 8.0 (L) 14.0 - 18.0 g/dL    Hematocrit 25.3 (L) 40.0 - 54.0 %    MCV 85 82 - 98 fL    MCH 26.8 (L) 27.0 - 31.0 pg    MCHC 31.6 (L) 32.0 - 36.0 g/dL    RDW 19.6 (H) 11.5 - 14.5 %    Platelets 339 150 - 450 K/uL    MPV 9.4 9.2 - 12.9 fL    Immature Granulocytes 1.8 (H) 0.0 - 0.5 %    Gran # (ANC) 9.2 (H) 1.8 - 7.7 K/uL    Immature Grans (Abs) 0.20 (H) 0.00 - 0.04 K/uL    Lymph # 1.1 1.0 - 4.8 K/uL    Mono # 0.6 0.3 - 1.0 K/uL    Eos # 0.1 0.0 - 0.5 K/uL    Baso # 0.07 0.00  - 0.20 K/uL    nRBC 0 0 /100 WBC    Gran % 81.8 (H) 38.0 - 73.0 %    Lymph % 9.5 (L) 18.0 - 48.0 %    Mono % 5.1 4.0 - 15.0 %    Eosinophil % 1.2 0.0 - 8.0 %    Basophil % 0.6 0.0 - 1.9 %    Differential Method Automated    APTT    Collection Time: 04/17/24  6:50 AM   Result Value Ref Range    aPTT 28.1 21.0 - 32.0 sec   POCT glucose    Collection Time: 04/17/24  7:00 AM   Result Value Ref Range    POCT Glucose 174 (H) 70 - 110 mg/dL   ]    I/O last 3 completed shifts:  In: 666.6 [P.O.:120; I.V.:154.3; IV Piggyback:392.3]  Out: -     Vitals:    04/17/24 0316 04/17/24 0414 04/17/24 0659 04/17/24 0700   BP:  138/64 (!) 120/57    Pulse: 66 66 64 71   Resp:  18 18    Temp:  98.7 °F (37.1 °C) 98 °F (36.7 °C)    TempSrc:  Oral Oral    SpO2:  98% (!) 94%    Weight:       Height:           No Jvd, Thyromegaly or Lymphadenopathy  Lungs: Fairly clear anteriorly and laterally  Cor: RRR no G or rubs  Abd: Soft benign good bowel sounds non tender  Ext: Pos edema R leg infection     A)    ESRD hd mwf   tn  Paf  Cellulitis anemia  2nd hyperpth   Severe hypoalb (1.4)  Calcium 8.1     Consider hypercalcemia       P)    Renal Diet  Home meds  Protect access  HD mwf  EPO prn   Binders prn   Adjust all meds to the degree of renal fx  Close follow up I/O and weights  Maintain Hydration   Wound care and antibiotx as per 1ry

## 2024-04-17 NOTE — ASSESSMENT & PLAN NOTE
Patient has Paroxysmal (<7 days) atrial fibrillation which is uncontrolled. Patient is currently in atrial fibrillation.  - new onset Afib  - on labetalol for BP at home, eliquis for chronic DVT but did miss some doses of eliquis  - in ED given diltiazem gtt but that caused hypotension  - EKG with Afib RVR with normal Qtc  - started amiodarone with conversion to NSR. Changed to PO amiodarone  - however, he had recurrent Afib and was placed back on amio gtt.   - Cardiology consulted: given worsening LFTs, okay to stop amiodarone and monitor (04/16/24)  - plan for SONY/DCCV on 3/22/24 but converted to sinus rhythm. Has been in/out of afib but rate controlled  - Hold Eliquis for I&D and possible future procedures at this time.    - On heparin gtt  - Restart eliquis once done with procedures.

## 2024-04-17 NOTE — PROGRESS NOTES
GI PLAN OF CARE NOTE    LFTs stable. Plan for hepatology clinic f/u outpatient.       Elevated lfts. Hypotension. Hyperlipidemia.   Plan/ Recommendations:  1.   Agree with acute hep panel is negative for acute infection and abdominal US- unremarkable. Rec hold statin and optimize hypotension. Trend lfts.      will sign off  Thank you so much for allowing us to participate in the care of Miguel Moore . Please contact us if you have any additional questions.     Bren Ovalles NP  Gastroenterology  [facility] - Med Surg

## 2024-04-17 NOTE — ASSESSMENT & PLAN NOTE
Chronic.   Hold home amlodipine and labetolol given low-normal BP  Avoid hypotension     Latest blood pressure and vitals reviewed-     Temp:  [98 °F (36.7 °C)-99 °F (37.2 °C)]   Pulse:  [64-75]   Resp:  [18]   BP: (116-138)/(56-64)   SpO2:  [94 %-100 %] .   Home meds for hypertension were reviewed and noted below.   Hypertension Medications               amLODIPine (NORVASC) 10 MG tablet Take 1 tablet by mouth once daily    labetaloL (NORMODYNE) 100 MG tablet Take 1 tablet (100 mg total) by mouth once daily.            Will utilize p.r.n. blood pressure medication only if patient's blood pressure greater than 180/110 and he develops symptoms such as worsening chest pain or shortness of breath.

## 2024-04-17 NOTE — ASSESSMENT & PLAN NOTE
R ankle pain, swelling, warmth present. Potential gout vs cellulitis. 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 No fracture present. Uric acid normal. Arterial US and venous US with no clots, appropriate flow    - initially on ceftriaxone/vanc for 7 days and still with pain and increased WBC and fevers  - blood cultures currently NGTD  - CT right ankle with no evidence of osteo.  - orthopedic surgery consulted.   4/4-Right ankle wound then had new abscess/discharge sp I& D with wound vac placement  4/6-Repeat OR I&D and wound vac back on. OP wound culture grew e coli.   04/9 patient had a repeat washout with I&D.  -Blood cx with no growth to date   -ID consulted: changed to Ancef (04/16) and continue on vancomycin for MRSA coverage. Patient will need total of 6 weeks of antibiotics.  (JJ 5/20/24)  -continue daily wound care.  -repeat blood cultures no growth  -trend WBC

## 2024-04-17 NOTE — NURSING
[facility]  Nurses Note -- 4 Eyes      4/17/2024       Skin assessed on: Q Shift      [] No Pressure Injuries Present    []Prevention Measures Documented    [x] Yes LDA  for Pressure Injury Previously documented     [] Yes New Pressure Injury Discovered   [] LDA for New Pressure Injury Added      Attending RN:  Stephanie Barthelemy, RN     Second RN:  Tayla Tong RN

## 2024-04-17 NOTE — SUBJECTIVE & OBJECTIVE
Interval History: Doing well today - wound vac removed yesterday. Reports tolerating abx.     Review of Systems   Constitutional:  Negative for chills and fever.   All other systems reviewed and are negative.    Objective:     Vital Signs (Most Recent):  Temp: 98 °F (36.7 °C) (04/17/24 0659)  Pulse: 71 (04/17/24 0700)  Resp: 18 (04/17/24 0659)  BP: (!) 120/57 (04/17/24 0659)  SpO2: (!) 94 % (04/17/24 0659) Vital Signs (24h Range):  Temp:  [98 °F (36.7 °C)-99 °F (37.2 °C)] 98 °F (36.7 °C)  Pulse:  [64-75] 71  Resp:  [18] 18  SpO2:  [94 %-100 %] 94 %  BP: (116-138)/(56-64) 120/57     Weight: 90.4 kg (199 lb 4.7 oz)  Body mass index is 30.3 kg/m².    Estimated Creatinine Clearance: 12.1 mL/min (A) (based on SCr of 6.3 mg/dL (H)).     Physical Exam  Constitutional:       General: He is not in acute distress.     Appearance: He is not ill-appearing or toxic-appearing.   Eyes:      General:         Right eye: No discharge.         Left eye: No discharge.   Pulmonary:      Effort: Pulmonary effort is normal. No respiratory distress.   Abdominal:      General: There is no distension.      Palpations: Abdomen is soft.      Tenderness: There is no abdominal tenderness.   Musculoskeletal:         General: Swelling (RUE - stable) present.   Skin:     Comments: R ankle dressed   Neurological:      Mental Status: He is alert.          Significant Labs:   Microbiology Results (last 7 days)       Procedure Component Value Units Date/Time    Blood culture [[id number]] Collected: 04/13/24 1237    Order Status: Completed Specimen: Blood Updated: 04/16/24 1503     Blood Culture, Routine No Growth to date      No Growth to date      No Growth to date      No Growth to date            Significant Imaging: I have reviewed all pertinent imaging results/findings within the past 24 hours.

## 2024-04-17 NOTE — ASSESSMENT & PLAN NOTE
Patient's anemia is currently controlled. Has not received any PRBCs to date.   Lab Results   Component Value Date    HGB 8.0 (L) 04/17/2024    HGB 8.0 (L) 04/17/2024    HCT 25.3 (L) 04/17/2024    HCT 25.3 (L) 04/17/2024     Anemia of CKD plus anemia of acute blood loss as expected from surgical procedures.  -Transfused 2 units of blood with adequate correction. (Transfused 1U on 04/06, and one unit on 04/14)  -patient receiving Epogen and iron supplement.  -Continue to monitor, transfuse for hemoglobin less than 7

## 2024-04-17 NOTE — NURSING
Pt lying in bed, dressing to right ankle is dry, clean and intact. Foam dressing to right thigh and sacrum is intact. 2L NC. Limb alert to left arm present. Call light in reach, instructed pt to call for assistance.       [facility] [place]  Nurses Note -- 4 Eyes      4/16/2024       Skin assessed on: Q Shift      [] No Pressure Injuries Present    []Prevention Measures Documented    [x] Yes LDA  for Pressure Injury Previously documented     [] Yes New Pressure Injury Discovered   [] LDA for New Pressure Injury Added      Attending RN:  Tayla Tong, RN     Second RN:  Lizzy/Jeana

## 2024-04-17 NOTE — SUBJECTIVE & OBJECTIVE
Interval History:  No acute overnight events.  Patient remained afebrile.  Alert and oriented x3 this morning.  Admits he feels fatigued but better today. Clinically appears better this morning.  Denies any pain in any of his extremities or joints at this time.  Admits weakness.    Review of Systems   Constitutional:  Positive for fatigue. Negative for chills and fever.   Musculoskeletal:  Negative for arthralgias, back pain and joint swelling.   Neurological:  Positive for weakness.   Psychiatric/Behavioral:  Negative for confusion and hallucinations.      Objective:     Vital Signs (Most Recent):  Temp: 98 °F (36.7 °C) (04/17/24 0701)  Pulse: 75 (04/17/24 1000)  Resp: 18 (04/17/24 0701)  BP: (!) 120/57 (04/17/24 0701)  SpO2: (!) 94 % (04/17/24 0701) Vital Signs (24h Range):  Temp:  [98 °F (36.7 °C)-99 °F (37.2 °C)] 98 °F (36.7 °C)  Pulse:  [64-75] 75  Resp:  [18] 18  SpO2:  [94 %-100 %] 94 %  BP: (116-138)/(56-64) 120/57     Weight: 90.4 kg (199 lb 4.7 oz)  Body mass index is 30.3 kg/m².    Intake/Output Summary (Last 24 hours) at 4/17/2024 1317  Last data filed at 4/17/2024 0916  Gross per 24 hour   Intake 806.31 ml   Output --   Net 806.31 ml         Physical Exam  Vitals and nursing note reviewed.   Constitutional:       General: He is not in acute distress.     Appearance: He is obese. He is not ill-appearing.   HENT:      Mouth/Throat:      Mouth: Mucous membranes are moist.   Pulmonary:      Effort: Pulmonary effort is normal. No respiratory distress.   Abdominal:      General: There is no distension.      Palpations: Abdomen is soft.      Tenderness: There is no abdominal tenderness.   Musculoskeletal:         General: Swelling (RUE - stable) present. No tenderness.   Skin:     Comments: R ankle with minimal swelling present. Wound vac removed on 04/16   Neurological:      Mental Status: He is alert.      Motor: Weakness (generalized weakness) present.             Significant Labs: All pertinent labs within  the past 24 hours have been reviewed.    Significant Imaging: I have reviewed all pertinent imaging results/findings within the past 24 hours.

## 2024-04-17 NOTE — PROGRESS NOTES
Wheelchair needed:  Miguel Moore has a mobility limitation that significantly impairs the patient's ability to participate in one or more mobility related activities of daily living (MRADL's) such as toileting, feeding, dressing, grooming, and bathing in customary locations in the home. The mobility limitation cannot be sufficiently resolved by the use of a cane or walker. The use of a manual wheelchair will significantly improve the patient's ability to participate in MRADLS and the patient will use it on regular basis in the home. Miguel Moore has expressed his/her willingness to use a manual wheelchair in the home. Patient also has a caregiver who is available, willing, and able to provide assistance with the wheelchair when needed.        Vira lift needed.  Patient require Vira lift to be transferred from the bed to the wheelchair.

## 2024-04-17 NOTE — PROGRESS NOTES
[facility] Surg  Infectious Disease  Progress Note    Patient Name: Miguel Moore  MRN: [id number]  Admission Date: 3/20/2024  Length of Stay: 28 days  Attending Physician: Pepe Martino DO  Primary Care Provider: Raciel Raymond MD    Isolation Status: No active isolations  Assessment/Plan:      ID  * Cellulitis/Abscess  of right foot/ankle  I independently reviewed patient's lab work and images as documented. 68 year old man with ESRD on HD, DM and CVA with R sided weakness admitted on 3/21 for fatigue, hyperkalemia, found to have Afib with RVR. CT ankle from 3/28 suggestive of cellulitis, no evidence of osteomyelitis, pt had new drainage from R ankle and was found to have an abscess. S/p I&D 4/4 and 4/6 with OR cx with Ecoli. Operative noted on 4/6 notable for necrotic tissue around distal fibula. Interval improvement in fevers and leukocytosis since surgery. Of note, pt previously had MRSA infection of R ankle in 2021 - empiric MRSA coverage added. He has elevated liver enzymes - unclear etiology at this time, he is on multiple medications that can cause hepatotoxicity (amio, atorvastatin, abx, etc) - amio/statin stopped 4/16. Blcx so far unrevealing.     Recommendations  - continue renally dosed ancef  - continue vancomycin goal trough 15-20 mcg/ml, dosing per pharmacy   -anticipate 6w course of abx (ancef/vanc) from last procedure, ruben 5/20    Outpatient Antibiotic Therapy Plan:    Please send referral to [facility].    1) Infection: Ecoli + presumed MRSA RLE OM    2) Discharge Antibiotics:    Intravenous antibiotics:  Vancomycin 500 mg after HD on HD days  Ancef 2g/2g/3g after HD on HD days      3) Therapy Duration:  6w    Estimated end date of IV antibiotics: 5/20/24    4) Outpatient Weekly Labs:    Order the following labs to be drawn on Mondays:   CBC  CMP   Vancomycin trough. Target 15-20    If discharged on vancomycin IV, order the following additional labs to  be drawn on Thursdays:  [facility]   Vancomycin trough. Target 15-20    If vancomycin trough is not at target (15-20) prior to discharge, schedule vancomycin trough to be drawn before their fourth outpatient dose.    5) Fax Lab Results to Infectious Diseases Provider: [email or web address]    [facility] Fax Number: [phone]    6) Outpatient Infectious Diseases Follow-up    Follow-up appointment will be arranged by the ID clinic and will be found in the patient's appointments tab.    Prior to discharge, please ensure the patient's follow-up has been scheduled.    If there is still no follow-up scheduled prior to discharge, please send an EPIC message to Tamara Brewster in Infectious Diseases.                  Thank you for your consult. Will sign off, please call with questions, new culture data or clinical changes. Above d/w primary team.       50 minutes of total time spent on the encounter, which includes face to face time and non-face to face time preparing to see the patient (eg, review of tests), obtaining and/or reviewing separately obtained history, documenting clinical information in the electronic or other health record, independently interpreting results (not separately reported) and communicating results to the patient/family/caregiver, or care coordination (not separately reported).         Laney Underwood MD  Infectious Disease  [facility] - Med Surg    Subjective:     Principal Problem:Cellulitis of right foot    HPI: 69 yo male with ESRD on HD, DM and CVA with R sided weakness admitted for fatigue, hyperkalemia, found to have Afib with RVR. ID consulted for fevers. Admission course notable for R ankle cellulitis, CT with extensive subcutaneous edema, no focal fluid collection or OM seen. He spiked a fever on 3/27 and 3/28 - blood cx obtained ngtd. TTE on 3/20 without IE. Pt is currently on vancomycin and meropenem. Prior to admission, pt reported he had pain in R ankle, denied fevers chills, sick contacts. He  states that since he's been here, he has had some loose stools and RUE swelling. Reported hives with PCN in ithe past, tolerated carb/cephs.   Interval History: Doing well today - wound vac removed yesterday. Reports tolerating abx.     Review of Systems   Constitutional:  Negative for chills and fever.   All other systems reviewed and are negative.    Objective:     Vital Signs (Most Recent):  Temp: 98 °F (36.7 °C) (04/17/24 0659)  Pulse: 71 (04/17/24 0700)  Resp: 18 (04/17/24 0659)  BP: (!) 120/57 (04/17/24 0659)  SpO2: (!) 94 % (04/17/24 0659) Vital Signs (24h Range):  Temp:  [98 °F (36.7 °C)-99 °F (37.2 °C)] 98 °F (36.7 °C)  Pulse:  [64-75] 71  Resp:  [18] 18  SpO2:  [94 %-100 %] 94 %  BP: (116-138)/(56-64) 120/57     Weight: 90.4 kg (199 lb 4.7 oz)  Body mass index is 30.3 kg/m².    Estimated Creatinine Clearance: 12.1 mL/min (A) (based on SCr of 6.3 mg/dL (H)).     Physical Exam  Constitutional:       General: He is not in acute distress.     Appearance: He is not ill-appearing or toxic-appearing.   Eyes:      General:         Right eye: No discharge.         Left eye: No discharge.   Pulmonary:      Effort: Pulmonary effort is normal. No respiratory distress.   Abdominal:      General: There is no distension.      Palpations: Abdomen is soft.      Tenderness: There is no abdominal tenderness.   Musculoskeletal:         General: Swelling (RUE - stable) present.   Skin:     Comments: R ankle dressed   Neurological:      Mental Status: He is alert.          Significant Labs:   Microbiology Results (last 7 days)       Procedure Component Value Units Date/Time    Blood culture [[id number]] Collected: 04/13/24 1234    Order Status: Completed Specimen: Blood Updated: 04/16/24 1500     Blood Culture, Routine No Growth to date      No Growth to date      No Growth to date      No Growth to date            Significant Imaging: I have reviewed all pertinent imaging results/findings within the past 24 hours.

## 2024-04-17 NOTE — ASSESSMENT & PLAN NOTE
I independently reviewed patient's lab work and images as documented. 68 year old man with ESRD on HD, DM and CVA with R sided weakness admitted on 3/21 for fatigue, hyperkalemia, found to have Afib with RVR. CT ankle from 3/28 suggestive of cellulitis, no evidence of osteomyelitis, pt had new drainage from R ankle and was found to have an abscess. S/p I&D 4/4 and 4/6 with OR cx with Ecoli. Operative noted on 4/6 notable for necrotic tissue around distal fibula. Interval improvement in fevers since surgery; however, has leukocytosis that is slowly improving. Of note, pt previously had MRSA infection of R ankle in 2021 - empiric MRSA coverage added. He has elevated liver enzymes - unclear etiology at this time, he is on multiple medications that can cause hepatotoxicity (amio, atorvastatin, abx, etc) - amio/statin stopped 4/16. Blcx so far unrevealing.     Recommendations  - continue renally dosed ancef  - continue vancomycin goal trough 15-20 mcg/ml, dosing per pharmacy   -anticipate 6w course of abx (ancef/vanc) from last procedure, ruben 5/20    Outpatient Antibiotic Therapy Plan:    Please send referral to [facility].    1) Infection: Ecoli + presumed MRSA RLE OM    2) Discharge Antibiotics:    Intravenous antibiotics:  Vancomycin 500 mg after HD on HD days  Ancef 2g/2g/3g after HD on HD days      3) Therapy Duration:  6w    Estimated end date of IV antibiotics: 5/20/24    4) Outpatient Weekly Labs:    Order the following labs to be drawn on Mondays:   CBC  CMP   Vancomycin trough. Target 15-20    If discharged on vancomycin IV, order the following additional labs to be drawn on Thursdays:  CMP   Vancomycin trough. Target 15-20    If vancomycin trough is not at target (15-20) prior to discharge, schedule vancomycin trough to be drawn before their fourth outpatient dose.    5) [facility] Results [facility]: [phone]    6)  Outpatient Infectious Diseases Follow-up    Follow-up appointment will be arranged by the ID clinic and will be found in the patient's appointments tab.    Prior to discharge, please ensure the patient's follow-up has been scheduled.    If there is still no follow-up scheduled prior to discharge, please send an EPIC message to Tamara Brewster in Infectious Diseases.

## 2024-04-17 NOTE — PROGRESS NOTES
Pharmacokinetic Assessment Follow Up: IV Vancomycin    Vancomycin serum concentration assessment(s):    The random level was drawn correctly and can be used to guide therapy at this time. The measurement is below the desired definitive target range of 10 to 20 mcg/mL.    Vancomycin Regimen Plan:  Vancomycin 750mg after dialysis on 4/17  Re-dose when the random level is less than 20 mcg/mL, next level to be drawn at 04:00 on 4/19    Drug levels (last 3 results):  Recent Labs   Lab Result Units 04/15/24  0424 04/17/24  0645   Vancomycin, Random ug/mL 15.0 13.0       Pharmacy will continue to follow and monitor vancomycin.    Please contact pharmacy at extension [phone] for questions regarding this assessment.    Thank you for the consult,   Siomara Curtis       Patient brief summary:  Miguel Moore is a 68 y.o. male initiated on antimicrobial therapy with IV Vancomycin for treatment of bone/joint infection      Drug Allergies:   Review of patient's allergies indicates:   Allergen Reactions    Ace inhibitors      Hyperkalemia 8/2018  Other reaction(s): Unknown    Penicillins Hives     Tolerated cefepime and cefdinir previously    Tizanidine      Felt hot       Actual Body Weight:   90.4 kg    Renal Function:   Estimated Creatinine Clearance: 9.9 mL/min (A) (based on SCr of 7.7 mg/dL (H)).,     Dialysis Method (if applicable):  intermittent HD    CBC (last 72 hours):  Recent Labs   Lab Result Units 04/15/24  0424 04/16/24  0345 04/16/24  0824 04/17/24  0645   WBC K/uL 14.82* 14.57* 13.30* 11.26  11.26   Hemoglobin g/dL 8.3* 9.1* 9.0* 8.0*  8.0*   Hematocrit % 26.0* 29.4* 28.0* 25.3*  25.3*   Platelets K/uL 354 361 325 339  339   Gran % % 85.7* 85.5* 85.6* 81.8*  81.8*   Lymph % % 6.5* 5.5* 6.0* 9.5*  9.5*   Mono % % 4.8 6.4 5.6 5.1  5.1   Eosinophil % % 1.1 0.7 0.8 1.2  1.2   Basophil % % 0.6 0.5 0.6 0.6  0.6   Differential Method  Automated Automated Automated Automated  Automated       Metabolic Panel (last  72 hours):  Recent Labs   Lab Result Units 04/15/24  0424 04/16/24  0345 04/17/24  0645   Sodium mmol/L 136 134* 134*   Potassium mmol/L 4.6 4.7 4.9   Chloride mmol/L 99 97 97   CO2 mmol/L 26 24 24   Glucose mg/dL 157* 137* 156*   BUN mg/dL 55* 44* 60*   Creatinine mg/dL 7.9* 6.3* 7.7*   Albumin g/dL 1.3* 1.4* 1.4*   Total Bilirubin mg/dL 0.3 0.4 0.4   Alkaline Phosphatase U/L 83 80 71   AST U/L 164* 176* 142*   ALT U/L 106* 118* 126*       Vancomycin Administrations:  vancomycin given in the last 96 hours                     vancomycin (VANCOCIN) 500 mg in dextrose 5 % in water (D5W) 100 mL IVPB (MB+) (mg) 500 mg New Bag 04/15/24 1601                    Microbiologic Results:  Microbiology Results (last 7 days)       Procedure Component Value Units Date/Time    Blood culture [[id number]] Collected: 04/13/24 1237    Order Status: Completed Specimen: Blood Updated: 04/16/24 1503     Blood Culture, Routine No Growth to date      No Growth to date      No Growth to date      No Growth to date

## 2024-04-17 NOTE — PLAN OF CARE
Problem: Adult Inpatient Plan of Care  Goal: Plan of Care Review  Outcome: Ongoing, Progressing  Flowsheets (Taken 4/17/2024 0726)  Plan of Care Reviewed With: patient     Problem: Infection  Goal: Absence of Infection Signs and Symptoms  Outcome: Ongoing, Progressing     Problem: Diabetes Comorbidity  Goal: Blood Glucose Level Within Targeted Range  Outcome: Ongoing, Progressing     Problem: Skin Injury Risk Increased  Goal: Skin Health and Integrity  Outcome: Ongoing, Progressing  Intervention: Optimize Skin Protection  Flowsheets (Taken 4/17/2024 0726)  Pressure Reduction Techniques:   frequent weight shift encouraged   heels elevated off bed   weight shift assistance provided  Head of Bed (HOB) Positioning: HOB elevated     Problem: Impaired Wound Healing  Goal: Optimal Wound Healing  Outcome: Ongoing, Progressing     Problem: Fall Injury Risk  Goal: Absence of Fall and Fall-Related Injury  Outcome: Ongoing, Progressing

## 2024-04-18 LAB
ALBUMIN SERPL BCP-MCNC: 1.4 G/DL (ref 3.5–5.2)
ALP SERPL-CCNC: 72 U/L (ref 55–135)
ALT SERPL W/O P-5'-P-CCNC: 113 U/L (ref 10–44)
ANION GAP SERPL CALC-SCNC: 10 MMOL/L (ref 8–16)
APTT PPP: 45.6 SEC (ref 21–32)
AST SERPL-CCNC: 120 U/L (ref 10–40)
BASOPHILS # BLD AUTO: 0.06 K/UL (ref 0–0.2)
BASOPHILS NFR BLD: 0.5 % (ref 0–1.9)
BILIRUB SERPL-MCNC: 0.5 MG/DL (ref 0.1–1)
BUN SERPL-MCNC: 37 MG/DL (ref 8–23)
CALCIUM SERPL-MCNC: 8.2 MG/DL (ref 8.7–10.5)
CHLORIDE SERPL-SCNC: 97 MMOL/L (ref 95–110)
CO2 SERPL-SCNC: 27 MMOL/L (ref 23–29)
CREAT SERPL-MCNC: 5.5 MG/DL (ref 0.5–1.4)
DIFFERENTIAL METHOD BLD: ABNORMAL
EOSINOPHIL # BLD AUTO: 0.2 K/UL (ref 0–0.5)
EOSINOPHIL NFR BLD: 1.3 % (ref 0–8)
ERYTHROCYTE [DISTWIDTH] IN BLOOD BY AUTOMATED COUNT: 19.8 % (ref 11.5–14.5)
EST. GFR  (NO RACE VARIABLE): 11 ML/MIN/1.73 M^2
GLUCOSE SERPL-MCNC: 144 MG/DL (ref 70–110)
HCT VFR BLD AUTO: 26.5 % (ref 40–54)
HGB BLD-MCNC: 8.4 G/DL (ref 14–18)
IMM GRANULOCYTES # BLD AUTO: 0.19 K/UL (ref 0–0.04)
IMM GRANULOCYTES NFR BLD AUTO: 1.7 % (ref 0–0.5)
LYMPHOCYTES # BLD AUTO: 0.9 K/UL (ref 1–4.8)
LYMPHOCYTES NFR BLD: 7.9 % (ref 18–48)
MCH RBC QN AUTO: 27.4 PG (ref 27–31)
MCHC RBC AUTO-ENTMCNC: 31.7 G/DL (ref 32–36)
MCV RBC AUTO: 86 FL (ref 82–98)
MONOCYTES # BLD AUTO: 0.7 K/UL (ref 0.3–1)
MONOCYTES NFR BLD: 6.4 % (ref 4–15)
NEUTROPHILS # BLD AUTO: 9.3 K/UL (ref 1.8–7.7)
NEUTROPHILS NFR BLD: 82.2 % (ref 38–73)
NRBC BLD-RTO: 0 /100 WBC
PLATELET # BLD AUTO: 333 K/UL (ref 150–450)
PMV BLD AUTO: 9 FL (ref 9.2–12.9)
POCT GLUCOSE: 139 MG/DL (ref 70–110)
POCT GLUCOSE: 156 MG/DL (ref 70–110)
POCT GLUCOSE: 170 MG/DL (ref 70–110)
POCT GLUCOSE: 209 MG/DL (ref 70–110)
POTASSIUM SERPL-SCNC: 4.1 MMOL/L (ref 3.5–5.1)
PROT SERPL-MCNC: 5.2 G/DL (ref 6–8.4)
RBC # BLD AUTO: 3.07 M/UL (ref 4.6–6.2)
SODIUM SERPL-SCNC: 134 MMOL/L (ref 136–145)
WBC # BLD AUTO: 11.31 K/UL (ref 3.9–12.7)

## 2024-04-18 PROCEDURE — 97530 THERAPEUTIC ACTIVITIES: CPT | Mod: HCNC,CQ

## 2024-04-18 PROCEDURE — 25000003 PHARM REV CODE 250: Mod: HCNC | Performed by: ORTHOPAEDIC SURGERY

## 2024-04-18 PROCEDURE — 36415 COLL VENOUS BLD VENIPUNCTURE: CPT | Mod: HCNC | Performed by: STUDENT IN AN ORGANIZED HEALTH CARE EDUCATION/TRAINING PROGRAM

## 2024-04-18 PROCEDURE — 97530 THERAPEUTIC ACTIVITIES: CPT | Mod: HCNC

## 2024-04-18 PROCEDURE — 63600175 PHARM REV CODE 636 W HCPCS: Mod: HCNC | Performed by: ORTHOPAEDIC SURGERY

## 2024-04-18 PROCEDURE — 63600175 PHARM REV CODE 636 W HCPCS: Mod: HCNC | Performed by: STUDENT IN AN ORGANIZED HEALTH CARE EDUCATION/TRAINING PROGRAM

## 2024-04-18 PROCEDURE — 97110 THERAPEUTIC EXERCISES: CPT | Mod: HCNC

## 2024-04-18 PROCEDURE — 97535 SELF CARE MNGMENT TRAINING: CPT | Mod: HCNC

## 2024-04-18 PROCEDURE — 21400001 HC TELEMETRY ROOM: Mod: HCNC

## 2024-04-18 PROCEDURE — 25000003 PHARM REV CODE 250: Mod: HCNC | Performed by: STUDENT IN AN ORGANIZED HEALTH CARE EDUCATION/TRAINING PROGRAM

## 2024-04-18 PROCEDURE — 80053 COMPREHEN METABOLIC PANEL: CPT | Mod: HCNC | Performed by: ORTHOPAEDIC SURGERY

## 2024-04-18 PROCEDURE — 85730 THROMBOPLASTIN TIME PARTIAL: CPT | Mod: HCNC | Performed by: STUDENT IN AN ORGANIZED HEALTH CARE EDUCATION/TRAINING PROGRAM

## 2024-04-18 PROCEDURE — 25000003 PHARM REV CODE 250: Mod: HCNC | Performed by: INTERNAL MEDICINE

## 2024-04-18 PROCEDURE — 85025 COMPLETE CBC W/AUTO DIFF WBC: CPT | Mod: HCNC | Performed by: STUDENT IN AN ORGANIZED HEALTH CARE EDUCATION/TRAINING PROGRAM

## 2024-04-18 RX ADMIN — ONDANSETRON 4 MG: 2 INJECTION INTRAMUSCULAR; INTRAVENOUS at 12:04

## 2024-04-18 RX ADMIN — INSULIN ASPART 1 UNITS: 100 INJECTION, SOLUTION INTRAVENOUS; SUBCUTANEOUS at 08:04

## 2024-04-18 RX ADMIN — TAMSULOSIN HYDROCHLORIDE 0.8 MG: 0.4 CAPSULE ORAL at 09:04

## 2024-04-18 RX ADMIN — FINASTERIDE 5 MG: 5 TABLET, FILM COATED ORAL at 09:04

## 2024-04-18 RX ADMIN — ONDANSETRON 4 MG: 2 INJECTION INTRAMUSCULAR; INTRAVENOUS at 04:04

## 2024-04-18 RX ADMIN — Medication 324 MG: at 09:04

## 2024-04-18 RX ADMIN — DEXTROSE MONOHYDRATE 1 G: 2.5 INJECTION INTRAVENOUS at 03:04

## 2024-04-18 RX ADMIN — HEPARIN SODIUM 17 UNITS/KG/HR: 10000 INJECTION, SOLUTION INTRAVENOUS at 07:04

## 2024-04-18 RX ADMIN — NEPHROCAP 1 CAPSULE: 1 CAP ORAL at 09:04

## 2024-04-18 RX ADMIN — Medication 324 MG: at 04:04

## 2024-04-18 RX ADMIN — ONDANSETRON 4 MG: 2 INJECTION INTRAMUSCULAR; INTRAVENOUS at 09:04

## 2024-04-18 NOTE — PLAN OF CARE
Problem: Physical Therapy  Goal: Physical Therapy Goal  Description: Goals to be met by:  2024    Patient will increase functional independence with mobility by performin. Supine to sit with Modified [place]  2. Sit to supine with Modified [place]  3. Sit to stand transfer with Modified [place]  4. Gait  x 100 feet with Modified [place] using Quad Cane.    Recommend: Moderate Intensity Therapy at time of discharge.        Outcome: Ongoing, Progressing       Pt still requiring max-total A x2 persons for bed mobility overall

## 2024-04-18 NOTE — NURSING
Patient refused right ankle and heel dressing change. Dressing clean, dry, intact with no drainage visible.

## 2024-04-18 NOTE — PROGRESS NOTES
The patient is in bed.  He reports he is doing okay.    Temp:  [97.8 °F (36.6 °C)-98.7 °F (37.1 °C)] 98.1 °F (36.7 °C)  Pulse:  [64-81] 67  Resp:  [16-20] 18  SpO2:  [93 %-97 %] 94 %  BP: (101-131)/(51-61) 116/53    Physical examination:    Right ankle dressing in place.      Recent Labs   Lab 04/18/24  0706   WBC 11.31   RBC 3.07*   HGB 8.4*   HCT 26.5*      MCV 86   MCH 27.4   MCHC 31.7*         Current Facility-Administered Medications:     0.9%  NaCl infusion (for blood administration), , Intravenous, Q24H PRN, Hipolito Mooney MD    0.9%  NaCl infusion, , Intravenous, Once, Jimbo Montilla III, MD    acetaminophen tablet 650 mg, 650 mg, Oral, Q6H PRN, Jimbo Montilla III, MD    ceFAZolin (ANCEF) 1 g in dextrose 5 % in water (D5W) 50 mL IVPB (MB+), 1 g, Intravenous, Daily, Laney Underwood MD, Stopped at 04/17/24 0823    dextrose 10% bolus 125 mL 125 mL, 12.5 g, Intravenous, PRN, Jimbo Montilla III, MD    dextrose 10% bolus 250 mL 250 mL, 25 g, Intravenous, PRN, Jimbo Montilla III, MD    diphenoxylate-atropine 2.5-0.025 mg per tablet 1 tablet, 1 tablet, Oral, Q6H PRN, Jimbo Montilla III, MD, 1 tablet at 04/02/24 2334    epoetin marisol-epbx injection 10,000 Units, 10,000 Units, Subcutaneous, Every Mon, Wed, Fri, Karen Tamez MD, 10,000 Units at 04/17/24 0816    ferrous gluconate tablet 324 mg, 324 mg, Oral, BID WM, Karen Tamez MD, 324 mg at 04/17/24 1615    finasteride tablet 5 mg, 5 mg, Oral, Daily, Jimbo Montilla III, MD, 5 mg at 04/17/24 0816    glucagon (human recombinant) injection 1 mg, 1 mg, Intramuscular, PRN, Jimbo Montilla III, MD    glucose chewable tablet 16 g, 16 g, Oral, PRN, Jimbo Montilla III, MD    glucose chewable tablet 24 g, 24 g, Oral, PRN, Jimbo Montilla III, MD    heparin 25,000 units in dextrose 5% (100 units/ml) IV bolus from bag LOW INTENSITY nomogram - [facility], 60 Units/kg (Adjusted), Intravenous, PRN, Nusrat Martino-Carolyn TDaniel, DO, 4,630 Units at 04/17/24 0955     heparin 25,000 units in dextrose 5% (100 units/ml) IV bolus from bag LOW INTENSITY nomogram - [facility], 30 Units/kg (Adjusted), Intravenous, PRN, Pepe Martino, DO, 2,310 Units at 04/16/24 1617    heparin 25,000 units in dextrose 5% 250 mL (100 units/mL) infusion LOW INTENSITY nomogram - [facility], 0-40 Units/kg/hr (Adjusted), Intravenous, Continuous, Pepe Martino, DO, Last Rate: 13.1 mL/hr at 04/17/24 2139, 17 Units/kg/hr at 04/17/24 2139    HYDROmorphone injection 0.5 mg, 0.5 mg, Intravenous, Q4H PRN, Jimbo Montilla III, MD, 0.5 mg at 04/15/24 2107    insulin aspart U-100 pen 0-5 Units, 0-5 Units, Subcutaneous, QID (AC + HS) PRN, Jimbo Montilla III, MD, 1 Units at 04/17/24 2140    melatonin tablet 6 mg, 6 mg, Oral, Nightly PRN, Jimbo Montilla III, MD, 6 mg at 04/14/24 2052    naloxone 0.4 mg/mL injection 0.02 mg, 0.02 mg, Intravenous, PRN, Jimbo Montilla III, MD    ondansetron injection 4 mg, 4 mg, Intravenous, Q6H PRN, Jimbo Montilla III, MD, 4 mg at 04/18/24 0420    oxyCODONE-acetaminophen  mg per tablet 1 tablet, 1 tablet, Oral, Q4H PRN, Jimbo Montilla III, MD, 1 tablet at 04/17/24 2140    prochlorperazine injection Soln 5 mg, 5 mg, Intravenous, Q6H PRN, Jimbo Montilla III, MD, 5 mg at 04/16/24 0137    sodium chloride 0.9% bolus 250 mL 250 mL, 250 mL, Intravenous, PRN, Jimbo Montilla III, MD    sodium chloride 0.9% bolus 250 mL 250 mL, 250 mL, Intravenous, PRNRoldan Kearny Q. III, MD    tamsulosin 24 hr capsule 0.8 mg, 2 capsule, Oral, Daily, Jimbo Montilla III, MD, 0.8 mg at 04/17/24 0816    Pharmacy to dose Vancomycin consult, , , Once **AND** vancomycin - pharmacy to dose, , Intravenous, pharmacy to manage frequency, Melinda Casillas MD    vitamin renal formula (B-complex-vitamin c-folic acid) 1 mg per capsule 1 capsule, 1 capsule, Oral, Daily, Jimbo Montilla III, MD, 1 capsule at 04/17/24 0816    Assessment and Plan:    68-year-old male with history of end-stage renal  disease on dialysis presents with right leg infection. He underwent I&D of his right leg abscess on 04/04 and repeat I and D and replacement of wound VAC on 4/6 and 4/9.     Cultures revealed E coli, on cefazolin     Leukocytosis resolved     Continue wound care dressing changes at home.     Plan follow-up Dr. Montilla after discharge for right ankle.  Please call for any further concerns.       Jimbo Montilla MD  [facility]  [phone]  This note has been transcribed with voice recognition software, but not reviewed and may contain unrecognized errors.

## 2024-04-18 NOTE — NURSING
[facility]  Nurses Note -- 4 Eyes      4/18/2024       Skin assessed on: Q Shift      [] No Pressure Injuries Present    []Prevention Measures Documented    [x] Yes LDA  for Pressure Injury Previously documented     [] Yes New Pressure Injury Discovered   [] LDA for New Pressure Injury Added      Attending RN:  Lizzy Mauricio RN     Second RN:  DARRION Lazar

## 2024-04-18 NOTE — PT/OT/SLP PROGRESS
Occupational Therapy   Treatment    Name: Miguel Moore  MRN: [id number]  Admitting Diagnosis:  Cellulitis of right foot  9 Days Post-Op    Recommendations:     Discharge Recommendations: Moderate Intensity Therapy  Discharge Equipment Recommendations:  hospital bed, lift device  Barriers to discharge:   (pt. is still requiring max to total assist x 2 people and has R sided pre-morbid weakness due to CVA, so high fall risk)    Assessment:     Miguel Moore is a 68 y.o. male with a medical diagnosis of Cellulitis of right foot.  He presents with HOB elevated and saying that he needs to eat lunch, but agreed to treatment. Performance deficits affecting function are weakness, impaired endurance, impaired self care skills, impaired functional mobility, impaired sensation, gait instability, impaired balance, impaired cognition, decreased upper extremity function, decreased lower extremity function, decreased coordination, decreased safety awareness, pain, abnormal tone, decreased ROM, impaired coordination, impaired fine motor, impaired skin, edema.     Rehab Prognosis:   Fair+ ; patient would benefit from acute skilled OT services to address these deficits and reach maximum level of function.       Plan:     Patient to be seen 5 x/week to address the above listed problems via self-care/home management, therapeutic activities, therapeutic exercises  Plan of Care Expires: 04/29/24  Plan of Care Reviewed with: patient, significant other    Subjective     Chief Complaint: weakness   Patient/Family Comments/goals: to return home   Pain/Comfort:  Pain Rating 1: 0/10    Objective:     Communicated with: RNLizzy, prior to session.  Patient found HOB elevated with bed alarm, peripheral IV, pressure relief boots, telemetry upon OT entry to room.    General Precautions: Standard, fall, diabetic    Orthopedic Precautions:N/A  Braces: N/A  Respiratory Status: Room air     Occupational Performance:     Bed Mobility:    Patient  completed Rolling/Turning to Right with maximal assistance and 2  persons  Patient completed Scooting/Bridging with maximal assistance and 2  persons  Patient completed Supine to Sit with maximal assistance and 2  persons  Patient completed Sit to Supine with maximal assistance and 2  persons     Functional Mobility/Transfers:  Patient sat EOB x 15-20 min with max assistance and at times CGA   He could hold himself up at EOB when reminded to lean forward when started leaning posteriorly and to right side.     Activities of Daily Living:  Feeding:  maximal assistance seated EOB to drink one sip with left arm; otherwise, usually setup       [facility] 6 Click ADL: 12    Treatment & Education:  Occupational therapy educated re: trunk control when seated and hand placement needed for trunk control   Occupational therapy spoke to him re: doing more for therapy including moving left arm x 10 reps x 2 sets with reduced AROM due to weak scapula  He also demonstrated scapula exercises with left upper extremity while seated EOB     Patient left HOB elevated with all lines intact, call button in reach, RN  notified, and SO present    GOALS:   Multidisciplinary Problems       Occupational Therapy Goals          Problem: Occupational Therapy    Goal Priority Disciplines Outcome Interventions   Occupational Therapy Goal     OT, PT/OT Ongoing, Progressing    Description: Goals to be met by: 4/15/24     Patient will increase functional independence with ADLs by performing:    Grooming while seated at EOB w/ seated with Supervision.  Toileting from bedside commode with Min A for hygiene and clothing management.   Rolling to Bilateral with SBA.   Supine to sit with SBA.  Sit to stand with Min A.   Sitting EOB for >25 min w/ SBA for sitting balance.   Step transfer: TBD   Toilet transfer: TBD  Upper extremity exercise program x15 reps per handout, with independence.                         Time Tracking:     OT Date of Treatment:  04/18/24  OT Start Time: 1348  OT Stop Time: 1411  OT Total Time (min): 23 min    Billable Minutes:Therapeutic Activity 15   Therapeutic Exercise 8     OT/GENEVA: OT      Co-treat with PTA    4/18/2024

## 2024-04-18 NOTE — CONSULTS
Thank you for your consult to [facility]. We have reviewed the patient chart. This patient does meet criteria for [facility] service at this time.  Will assume care on 04/18/24 at 6AM.

## 2024-04-18 NOTE — PROGRESS NOTES
Vancomycin consult follow-up:     Patient reviewed, dialysis scheduled every Mon, Wed, Fri, no new levels, no dose today; Next levels due: random due 4/19/2024 at 0400

## 2024-04-18 NOTE — SUBJECTIVE & OBJECTIVE
Interval History: Pt noted to be awake, alert, conversant and calm. HDS and afebrile. No acute events overnight. No new complaints this morning.  Wound VAC has been removed.  Continue IV antibiotics per ID recommendations.  Continue dialysis, scheduled for HD tomorrow.  Plan to discharge home with home health after HD tomorrow.      Review of Systems   Constitutional:  Positive for activity change and fatigue. Negative for fever.   Respiratory:  Negative for cough and shortness of breath.    Cardiovascular:  Negative for chest pain.   Gastrointestinal:  Negative for abdominal pain.   Musculoskeletal:  Positive for arthralgias.   Neurological:  Negative for dizziness and headaches.   Psychiatric/Behavioral:  Negative for confusion.    All other systems reviewed and are negative.    Objective:     Vital Signs (Most Recent):  Temp: 98.2 °F (36.8 °C) (04/18/24 1615)  Pulse: 76 (04/18/24 1615)  Resp: 18 (04/18/24 1615)  BP: (!) 128/59 (04/18/24 1615)  SpO2: 100 % (04/18/24 1615) Vital Signs (24h Range):  Temp:  [97.8 °F (36.6 °C)-98.5 °F (36.9 °C)] 98.2 °F (36.8 °C)  Pulse:  [64-80] 76  Resp:  [18-20] 18  SpO2:  [93 %-100 %] 100 %  BP: (101-128)/(53-59) 128/59     Weight: 90.4 kg (199 lb 4.7 oz)  Body mass index is 30.3 kg/m².    Intake/Output Summary (Last 24 hours) at 4/18/2024 1730  Last data filed at 4/18/2024 1245  Gross per 24 hour   Intake 909.41 ml   Output --   Net 909.41 ml         Physical Exam  Vitals and nursing note reviewed.   Constitutional:       Appearance: Normal appearance.   HENT:      Head: Normocephalic and atraumatic.   Eyes:      Extraocular Movements: Extraocular movements intact.      Pupils: Pupils are equal, round, and reactive to light.   Cardiovascular:      Rate and Rhythm: Normal rate and regular rhythm.   Pulmonary:      Effort: Pulmonary effort is normal. No respiratory distress.   Abdominal:      General: Abdomen is flat. There is no distension.   Musculoskeletal:         General:  Normal range of motion.      Cervical back: Normal range of motion.   Neurological:      General: No focal deficit present.      Mental Status: He is alert and oriented to person, place, and time.   Psychiatric:         Mood and Affect: Mood normal.         Behavior: Behavior normal.             Significant Labs: All pertinent labs within the past 24 hours have been reviewed.  CBC:   Recent Labs   Lab 04/17/24  0645 04/18/24  0706   WBC 11.26  11.26 11.31   HGB 8.0*  8.0* 8.4*   HCT 25.3*  25.3* 26.5*     339 333     CMP:   Recent Labs   Lab 04/17/24  0645 04/18/24  0706   * 134*   K 4.9 4.1   CL 97 97   CO2 24 27   * 144*   BUN 60* 37*   CREATININE 7.7* 5.5*   CALCIUM 7.5* 8.2*   PROT 4.6* 5.2*   ALBUMIN 1.4* 1.4*   BILITOT 0.4 0.5   ALKPHOS 71 72   * 120*   * 113*   ANIONGAP 13 10       Significant Imaging: I have reviewed all pertinent imaging results/findings within the past 24 hours.

## 2024-04-18 NOTE — PLAN OF CARE
Pt AAO x 4, free from falls/injury, and able to make needs known during shift. VSS on 2 L NC. Continuous IV Heparin continued per orders. Nomogram followed. APTT orders initiated based on previous lab draws.  PTelemetry monitoring continued on box 8707, SR. No acute distress noted. Bed locked and in lowest position. Bedside table and call light in reach.     Problem: Infection  Goal: Absence of Infection Signs and Symptoms  Outcome: Ongoing, Progressing  Intervention: Prevent or Manage Infection  Flowsheets (Taken 4/17/2024 1922)  Isolation Precautions: protective     Problem: Diabetes Comorbidity  Goal: Blood Glucose Level Within Targeted Range  Outcome: Ongoing, Progressing  Intervention: Monitor and Manage Glycemia  Flowsheets (Taken 4/17/2024 1922)  Glycemic Management: blood glucose monitored     Problem: Skin Injury Risk Increased  Goal: Skin Health and Integrity  Outcome: Ongoing, Progressing  Intervention: Optimize Skin Protection  Flowsheets (Taken 4/17/2024 1922)  Pressure Reduction Techniques:   weight shift assistance provided   frequent weight shift encouraged   pressure points protected   positioned off wounds  Skin Protection:   tubing/devices free from skin contact   transparent dressing maintained  Head of Bed (HOB) Positioning: HOB elevated     Problem: Impaired Wound Healing  Goal: Optimal Wound Healing  Outcome: Ongoing, Progressing  Intervention: Promote Wound Healing  Flowsheets (Taken 4/17/2024 1922)  Activity Management:   Arm raise - L1   Rolling - L1     Problem: Fall Injury Risk  Goal: Absence of Fall and Fall-Related Injury  Outcome: Ongoing, Progressing  Intervention: Identify and Manage Contributors  Flowsheets (Taken 4/17/2024 1922)  Self-Care Promotion:   BADL personal objects within reach   meal set-up provided  Medication Review/Management: medications reviewed

## 2024-04-18 NOTE — PLAN OF CARE
Pt A&Ox4, free from falls/injury, and able to make needs known during shift. VSS. Pt had no c/o pain during shift. PRN nausea medication given with relief. Telemetry monitoring continued, visible and audible on monitor at nurses' station, no acute distress noted. Bed locked and in lowest position. Bedside table and call light in reach. Will cont to monitor.    Problem: Adult Inpatient Plan of Care  Goal: Plan of Care Review  Outcome: Ongoing, Progressing  Goal: Patient-Specific Goal (Individualized)  Outcome: Ongoing, Progressing  Goal: Absence of Hospital-Acquired Illness or Injury  Outcome: Ongoing, Progressing  Goal: Optimal Comfort and Wellbeing  Outcome: Ongoing, Progressing  Goal: Readiness for Transition of Care  Outcome: Ongoing, Progressing     Problem: Infection  Goal: Absence of Infection Signs and Symptoms  Outcome: Ongoing, Progressing     Problem: Diabetes Comorbidity  Goal: Blood Glucose Level Within Targeted Range  Outcome: Ongoing, Progressing     Problem: Impaired Wound Healing  Goal: Optimal Wound Healing  Outcome: Ongoing, Progressing

## 2024-04-18 NOTE — PROGRESS NOTES
[facility] Medicine  Telemedicine Progress Note    Patient Name: Miguel Moore  MRN: [id number]  Patient Class: IP- Inpatient   Admission Date: 3/20/2024  Length of Stay: 29 days  Attending Physician: Rosalva Manning MD  Primary Care Provider: Raciel Raymond MD          Subjective:     Principal Problem:Cellulitis of right foot        HPI:  Mr Miguel Moore is a 68 y.o. man with ESRD on HD, HTN, DM with neuropathy, history of CVA with residual R sided weakness, chronic DVT on eliquis who presented with feeling poorly. He attended his usual dialysis session on Monday 3/18 without issues. He developed fatigue and R ankle swelling. He has some pain with palpation but is able to walk. No reports of trauma. No wounds. No falls. No history of gout. Today he was feeling worse so did not go to dialysis and came to the ED. No fevers. No shortness of breath. Normal appetite. No nausea, vomiting. No chest pain. No palpitations.     In the ED, afebrile with HR initially controlled then to 130s Afib RVR. Started diltiazem but became hypotensive. Diltiazem stopped. Given antibiotics. Admitted for further management.     Overview/Hospital Course:   68 year old man with ESRD on HD, DM and CVA with R sided weakness admitted on 3/21 for fatigue, hyperkalemia, found to have Afib with RVR. Started amio gtt with conversion to NSR. TTE EF 55-60%, mild LAE. Cardiology consulted. Transitioned on PO amiodarone and continues home eliquis (on this for chronic DVT). He received HD with resolution of hyperkalemia. Transferred out the ICU on 03/26. CT ankle from 3/28 suggestive of cellulitis, no evidence of osteomyelitis, pt had new drainage from R ankle and was found to have an abscess. Orthopedic surgery consulted. Pt S/p I&D 4/4 and 4/6 with OR cx with Ecoli. Operative noted on 4/6 notable for necrotic tissue around distal fibula. Interval improvement in fevers since surgery; however, has leukocytosis that is  slowly improving. ID consulted-empiric MRSA coverage added given hx of MRSA. Orthopedic discontinued wound vac on 04/16. PT/OT recommends moderate intensity therapy, but patient declines SNF. Will need home health on discharge. He has elevated liver enzymes - unclear etiology at this time, he is on multiple medications that can cause hepatotoxicity (amio, atorvastatin, abx, etc). Hepatitis panel nonreactive. US abdomen with Hepatosplenomegaly. Blcx so far unrevealing. GI consulted. Amiodarone and statin held.     Interval History: Pt noted to be awake, alert, conversant and calm. HDS and afebrile. No acute events overnight. No new complaints this morning.  Wound VAC has been removed.  Continue IV antibiotics per ID recommendations.  Continue dialysis, scheduled for HD tomorrow.  Plan to discharge home with home health after HD tomorrow.      Review of Systems   Constitutional:  Positive for activity change and fatigue. Negative for fever.   Respiratory:  Negative for cough and shortness of breath.    Cardiovascular:  Negative for chest pain.   Gastrointestinal:  Negative for abdominal pain.   Musculoskeletal:  Positive for arthralgias.   Neurological:  Negative for dizziness and headaches.   Psychiatric/Behavioral:  Negative for confusion.    All other systems reviewed and are negative.    Objective:     Vital Signs (Most Recent):  Temp: 98.2 °F (36.8 °C) (04/18/24 1615)  Pulse: 76 (04/18/24 1615)  Resp: 18 (04/18/24 1615)  BP: (!) 128/59 (04/18/24 1615)  SpO2: 100 % (04/18/24 1615) Vital Signs (24h Range):  Temp:  [97.8 °F (36.6 °C)-98.5 °F (36.9 °C)] 98.2 °F (36.8 °C)  Pulse:  [64-80] 76  Resp:  [18-20] 18  SpO2:  [93 %-100 %] 100 %  BP: (101-128)/(53-59) 128/59     Weight: 90.4 kg (199 lb 4.7 oz)  Body mass index is 30.3 kg/m².    Intake/Output Summary (Last 24 hours) at 4/18/2024 1730  Last data filed at 4/18/2024 1245  Gross per 24 hour   Intake 909.41 ml   Output --   Net 909.41 ml         Physical  Exam  Vitals and nursing note reviewed.   Constitutional:       Appearance: Normal appearance.   HENT:      Head: Normocephalic and atraumatic.   Eyes:      Extraocular Movements: Extraocular movements intact.      Pupils: Pupils are equal, round, and reactive to light.   Cardiovascular:      Rate and Rhythm: Normal rate and regular rhythm.   Pulmonary:      Effort: Pulmonary effort is normal. No respiratory distress.   Abdominal:      General: Abdomen is flat. There is no distension.   Musculoskeletal:         General: Normal range of motion.      Cervical back: Normal range of motion.   Neurological:      General: No focal deficit present.      Mental Status: He is alert and oriented to person, place, and time.   Psychiatric:         Mood and Affect: Mood normal.         Behavior: Behavior normal.             Significant Labs: All pertinent labs within the past 24 hours have been reviewed.  CBC:   Recent Labs   Lab 04/17/24  0645 04/18/24  0706   WBC 11.26  11.26 11.31   HGB 8.0*  8.0* 8.4*   HCT 25.3*  25.3* 26.5*     339 333     CMP:   Recent Labs   Lab 04/17/24  0645 04/18/24  0706   * 134*   K 4.9 4.1   CL 97 97   CO2 24 27   * 144*   BUN 60* 37*   CREATININE 7.7* 5.5*   CALCIUM 7.5* 8.2*   PROT 4.6* 5.2*   ALBUMIN 1.4* 1.4*   BILITOT 0.4 0.5   ALKPHOS 71 72   * 120*   * 113*   ANIONGAP 13 10       Significant Imaging: I have reviewed all pertinent imaging results/findings within the past 24 hours.    Assessment/Plan:      * Cellulitis/Abscess  of right foot/ankle  R ankle pain, swelling, warmth present. Potential gout vs cellulitis. 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 No fracture present. Uric acid normal. Arterial US and venous US with no clots, appropriate flow    - initially on ceftriaxone/vanc for 7 days and still with pain and increased WBC and fevers  - blood cultures currently NGTD  - CT right ankle with no evidence of osteo.  -  orthopedic surgery consulted.   4/4-Right ankle wound then had new abscess/discharge sp I& D with wound vac placement  4/6-Repeat OR I&D and wound vac back on. OP wound culture grew e coli.   04/9 patient had a repeat washout with I&D.  -Blood cx with no growth to date   -ID consulted: changed to Ancef (04/16) and continue on vancomycin for MRSA coverage. Patient will need total of 6 weeks of antibiotics.  (JJ 5/20/24)  -continue daily wound care.  -repeat blood cultures no growth  -trend WBC      Class 1 obesity due to excess calories with serious comorbidity and body mass index (BMI) of 30.0 to 30.9 in adult  Body mass index is 30.3 kg/m². Morbid obesity complicates all aspects of disease management from diagnostic modalities to treatment. Weight loss encouraged and health benefits explained to patient.         Open wound  -continue wound care and wound VAC management.  -patient on IV antibiotics cefepime 1 g Q 24 hours  -wound culture grew E coli.  -patient was followed by ID and Orthopedics, input appreciated.  -trend WBC  -repeat blood culture, no growth  -leukocytosis resolved on 04/17/2024        Paroxysmal atrial fibrillation with RVR  Patient has Paroxysmal (<7 days) atrial fibrillation which is uncontrolled. Patient is currently in atrial fibrillation.  - new onset Afib  - on labetalol for BP at home, eliquis for chronic DVT but did miss some doses of eliquis  - in ED given diltiazem gtt but that caused hypotension  - EKG with Afib RVR with normal Qtc  - started amiodarone with conversion to NSR. Changed to PO amiodarone  - however, he had recurrent Afib and was placed back on amio gtt.   - Cardiology consulted: given worsening LFTs, okay to stop amiodarone and monitor (04/16/24)  - plan for SONY/DCCV on 3/22/24 but converted to sinus rhythm. Has been in/out of afib but rate controlled  - Hold Eliquis for I&D and possible future procedures at this time.    - On heparin gtt  - Restart eliquis once done with  procedures.    Anemia in chronic kidney disease  Patient's anemia is currently controlled. Has not received any PRBCs to date.   Lab Results   Component Value Date    HGB 8.0 (L) 04/17/2024    HGB 8.0 (L) 04/17/2024    HCT 25.3 (L) 04/17/2024    HCT 25.3 (L) 04/17/2024     Anemia of CKD plus anemia of acute blood loss as expected from surgical procedures.  -Transfused 2 units of blood with adequate correction. (Transfused 1U on 04/06, and one unit on 04/14)  -patient receiving Epogen and iron supplement.  -Continue to monitor, transfuse for hemoglobin less than 7    Elevated liver enzymes  -elevated liver enzymes, worsening on 04/16   - unclear etiology at this time. However, he is on multiple medications that can cause hepatotoxicity (amio, atorvastatin, abx, etc).   -Hepatitis panel ordered.   -US abdomen with Hepatosplenomegaly.   -Discussed with cardiology, okay to stop PO amiodarone and monitor at this time (04/16/24)  -Will hold statin   -GI consulted.    Chronic deep vein thrombosis (DVT) of popliteal vein of right lower extremity 9/21/20 outside US; unprovoked; anticoagulation  Continue holding as patient may need repeat procedure.  Heparin gtt for now   -restart apixaban once procedures done.      History of stroke  PT/OT evaluation completed and recommended moderate intensity therapy  However, patient decline SNF. Would prefer to go home with home health on discharge   Case management will assist    Secondary hyperparathyroidism of renal origin  Continue renvela       ESRD (end stage renal disease)  ESRD via LUE AVF. Last session 3/18/24. Missed 3/20/24 due to fatigue. Usually MWF  - hyperkalemic on admit. Does not appear volume overloaded.   - Nephrology consulted.  - continue MWF HD    Hemiplegia and hemiparesis following cerebral infarction affecting right dominant side  No signs of acute stroke. Does have RUE weakness.   - continue home asa, statin  - resume apixaban once done with  procedures.      Seizure disorder as sequela of cerebrovascular accident  Not currently on antiepileptics.   No seizure activity reported.   Seizure precautions   Monitor.       Controlled type 2 diabetes mellitus with chronic kidney disease on chronic dialysis, with long-term current use of insulin  A1c:   Lab Results   Component Value Date    HGBA1C 5.8 (H) 03/20/2024     Meds: SSI PRN to maintain goal 140-180  ADA renal diet, accuchecks, hypoglycemic protocol      Dyslipidemia  Holding statin in setting of elevated LFTs      Benign essential HTN  Chronic.   Hold home amlodipine and labetolol given low-normal BP  Avoid hypotension     Latest blood pressure and vitals reviewed-     Temp:  [98 °F (36.7 °C)-99 °F (37.2 °C)]   Pulse:  [64-75]   Resp:  [18]   BP: (116-138)/(56-64)   SpO2:  [94 %-100 %] .   Home meds for hypertension were reviewed and noted below.   Hypertension Medications               amLODIPine (NORVASC) 10 MG tablet Take 1 tablet by mouth once daily    labetaloL (NORMODYNE) 100 MG tablet Take 1 tablet (100 mg total) by mouth once daily.            Will utilize p.r.n. blood pressure medication only if patient's blood pressure greater than 180/110 and he develops symptoms such as worsening chest pain or shortness of breath.      VTE Risk Mitigation (From admission, onward)           Ordered     heparin 25,000 units in dextrose 5% (100 units/ml) IV bolus from bag LOW INTENSITY nomogram - [facility]  As needed (PRN)        Question:  Heparin Infusion Adjustment (DO NOT MODIFY ANSWER)  Answer:  [email or web address]\Images\Pharmacy\HeparinInfusions\heparin LOW INTENSITY nomogram for [facility] [id number]    04/16/24 0805     heparin 25,000 units in dextrose 5% (100 units/ml) IV bolus from bag LOW INTENSITY nomogram - [facility]  As needed (PRN)        Question:  Heparin Infusion Adjustment (DO NOT MODIFY ANSWER)  Answer:  [email or web address]\Images\Pharmacy\HeparinInfusions\heparin LOW INTENSITY nomogram for [facility] [id number]     04/16/24 0805     heparin 25,000 units in dextrose 5% 250 mL (100 units/mL) infusion LOW INTENSITY nomogram - [facility]  Continuous        Question:  Begin at (units/kg/hr)  Answer:  12 04/16/24 0805                          I have completed this tele-visit without the assistance of a telepresenter.    The attending portion of this evaluation, treatment, and documentation was performed per Rosalva Manning MD via Telemedicine AudioVisual using the secure [employer] software platform with 2 way audio/video. The provider was located off-site and the patient is located in the hospital. The aforementioned video software was utilized to document the relevant history and physical exam    Rosalva Manning MD  Department of Hospital Medicine   [facility]

## 2024-04-18 NOTE — PROGRESS NOTES
Awake alert oriented NAD    Denies cns ent cp gi gu rheum sx    Wound dressing intact   Past Medical History:   Diagnosis Date    Allergy     Clotting disorder     Elaquis and APlavix    Deep vein thrombosis     Diabetes mellitus, type 2     Hypertension     Nuclear sclerosis of both eyes 6/9/2017    Renal disorder     Seizures     Stroke     Urinary tract infection      Past Surgical History:   Procedure Laterality Date    CATARACT EXTRACTION W/  INTRAOCULAR LENS IMPLANT Right 10/05/2017    Dr. Tay    CATARACT EXTRACTION W/  INTRAOCULAR LENS IMPLANT Left 10/19/2017    Dr. Tay    CYSTOSCOPY W/ RETROGRADES Bilateral 2/18/2021    Procedure: CYSTOSCOPY, WITH RETROGRADE PYELOGRAM;  Surgeon: JEMMA Styles MD;  Location: [facility] OR;  Service: Urology;  Laterality: Bilateral;  REQUESTING EARLY AS POSSIBE-LO / 2/8/2021 @ 1:13PM  RN Pre Op 2-11-21, Covid NEGATIVE ON  2-17-21.  C A    ESOPHAGOGASTRODUODENOSCOPY N/A 8/17/2020    Procedure: EGD (ESOPHAGOGASTRODUODENOSCOPY);  Surgeon: Desmond Chapman MD;  Location: [facility];  Service: Endoscopy;  Laterality: N/A;    EYE SURGERY Bilateral     cataract    FEMORAL ARTERY STENT      FRACTURE SURGERY      INCISION AND DRAINAGE, LOWER EXTREMITY Right 4/4/2024    Procedure: INCISION AND DRAINAGE, LOWER EXTREMITY;  Surgeon: Jimbo Montilla III, MD;  Location: [facility] OR;  Service: Orthopedics;  Laterality: Right;    INCISION AND DRAINAGE, LOWER EXTREMITY Right 4/6/2024    Procedure: INCISION AND DRAINAGE, LOWER EXTREMITY;  Surgeon: Desmond Barillas MD;  Location: [facility] OR;  Service: Orthopedics;  Laterality: Right;  foot and ankle    INCISION AND DRAINAGE, LOWER EXTREMITY Right 4/9/2024    Procedure: INCISION AND DRAINAGE, LOWER EXTREMITY- FOOT & ANKLE;  Surgeon: Jimbo Montilla III, MD;  Location: [facility] OR;  Service: Orthopedics;  Laterality: Right;  CULTURES, VASCHE    KNEE SURGERY      bilateral scope    WOUND DRESSING Right 4/9/2024    Procedure: WOUND VAC  EXCHANGE;  Surgeon: Jimbo Montilla III, MD;  Location: [facility];  Service: Orthopedics;  Laterality: Right;     Review of patient's allergies indicates:   Allergen Reactions    Ace inhibitors      Hyperkalemia 8/2018  Other reaction(s): Unknown    Penicillins Hives     Tolerated cefepime and cefdinir previously    Tizanidine      Felt hot       Current Facility-Administered Medications   Medication Dose Route Frequency Provider Last Rate Last Admin    0.9%  NaCl infusion (for blood administration)   Intravenous Q24H PRN Hipolito Mooney MD        0.9%  NaCl infusion   Intravenous Once Jimbo Montilla III, MD        acetaminophen tablet 650 mg  650 mg Oral Q6H PRN Jimbo Montilla III, MD        ceFAZolin (ANCEF) 1 g in dextrose 5 % in water (D5W) 50 mL IVPB (MB+)  1 g Intravenous Daily Laney Underwood MD   Stopped at 04/17/24 0823    dextrose 10% bolus 125 mL 125 mL  12.5 g Intravenous PRN Jimbo Montilla III, MD        dextrose 10% bolus 250 mL 250 mL  25 g Intravenous PRN Jimbo Montilla III, MD        diphenoxylate-atropine 2.5-0.025 mg per tablet 1 tablet  1 tablet Oral Q6H PRN Jimbo Montilla III, MD   1 tablet at 04/02/24 2334    epoetin marisol-epbx injection 10,000 Units  10,000 Units Subcutaneous Every Mon, Wed, Fri Karen Tamez MD   10,000 Units at 04/17/24 0816    ferrous gluconate tablet 324 mg  324 mg Oral BID  Karen Tamez MD   324 mg at 04/18/24 0951    finasteride tablet 5 mg  5 mg Oral Daily Jimbo Montilla III, MD   5 mg at 04/18/24 0951    glucagon (human recombinant) injection 1 mg  1 mg Intramuscular PRN Jimbo Montilla III, MD        glucose chewable tablet 16 g  16 g Oral PRN Jimbo Montilla III, MD        glucose chewable tablet 24 g  24 g Oral PRN Jimbo Montilla III, MD        heparin 25,000 units in dextrose 5% (100 units/ml) IV bolus from bag LOW INTENSITY nomogram - [facility]  60 Units/kg (Adjusted) Intravenous PRN Martino, Nusrat-My T., DO   4,630 Units at 04/17/24 0934     heparin 25,000 units in dextrose 5% (100 units/ml) IV bolus from bag LOW INTENSITY nomogram - [facility]  30 Units/kg (Adjusted) Intravenous PRN Wilman MartinoyeseniaCarolyn YARI., DO   2,310 Units at 04/16/24 1617    heparin 25,000 units in dextrose 5% 250 mL (100 units/mL) infusion LOW INTENSITY nomogram - [facility]  0-40 Units/kg/hr (Adjusted) Intravenous Continuous MartinoPepe., DO 13.1 mL/hr at 04/17/24 2139 17 Units/kg/hr at 04/17/24 2139    HYDROmorphone injection 0.5 mg  0.5 mg Intravenous Q4H PRN Jimbo Montilla III, MD   0.5 mg at 04/15/24 2107    insulin aspart U-100 pen 0-5 Units  0-5 Units Subcutaneous QID (AC + HS) PRN Jimbo Montilla III, MD   1 Units at 04/17/24 2140    melatonin tablet 6 mg  6 mg Oral Nightly PRN Jimbo Montilla III, MD   6 mg at 04/14/24 2052    naloxone 0.4 mg/mL injection 0.02 mg  0.02 mg Intravenous PRN Jimbo Montilla III, MD        ondansetron injection 4 mg  4 mg Intravenous Q6H PRN Jimbo Montilla III, MD   4 mg at 04/18/24 0420    oxyCODONE-acetaminophen  mg per tablet 1 tablet  1 tablet Oral Q4H PRN Jimbo Montilla III, MD   1 tablet at 04/17/24 2140    prochlorperazine injection Soln 5 mg  5 mg Intravenous Q6H PRN Jimbo Montilla III, MD   5 mg at 04/16/24 0137    sodium chloride 0.9% bolus 250 mL 250 mL  250 mL Intravenous PRN Jimbo Montilla III, MD        sodium chloride 0.9% bolus 250 mL 250 mL  250 mL Intravenous PRN Jimbo Montilla III, MD        tamsulosin 24 hr capsule 0.8 mg  2 capsule Oral Daily Jimbo Montilla III, MD   0.8 mg at 04/18/24 0951    vancomycin - pharmacy to dose   Intravenous pharmacy to manage frequency Melinda Casillas MD        vitamin renal formula (B-complex-vitamin c-folic acid) 1 mg per capsule 1 capsule  1 capsule Oral Daily Jimbo Montilla III, MD   1 capsule at 04/18/24 0951       LABS    Recent Results (from the past 24 hour(s))   POCT glucose    Collection Time: 04/17/24 12:03 PM   Result Value Ref Range    POCT Glucose 164 (H) 70  - 110 mg/dL   APTT    Collection Time: 04/17/24  3:48 PM   Result Value Ref Range    aPTT 43.7 (H) 21.0 - 32.0 sec   POCT glucose    Collection Time: 04/17/24  4:43 PM   Result Value Ref Range    POCT Glucose 149 (H) 70 - 110 mg/dL   POCT glucose    Collection Time: 04/17/24  8:14 PM   Result Value Ref Range    POCT Glucose 207 (H) 70 - 110 mg/dL   APTT    Collection Time: 04/17/24 10:09 PM   Result Value Ref Range    aPTT 44.1 (H) 21.0 - 32.0 sec   Comprehensive Metabolic Panel    Collection Time: 04/18/24  7:06 AM   Result Value Ref Range    Sodium 134 (L) 136 - 145 mmol/L    Potassium 4.1 3.5 - 5.1 mmol/L    Chloride 97 95 - 110 mmol/L    CO2 27 23 - 29 mmol/L    Glucose 144 (H) 70 - 110 mg/dL    BUN 37 (H) 8 - 23 mg/dL    Creatinine 5.5 (H) 0.5 - 1.4 mg/dL    Calcium 8.2 (L) 8.7 - 10.5 mg/dL    Total Protein 5.2 (L) 6.0 - 8.4 g/dL    Albumin 1.4 (L) 3.5 - 5.2 g/dL    Total Bilirubin 0.5 0.1 - 1.0 mg/dL    Alkaline Phosphatase 72 55 - 135 U/L     (H) 10 - 40 U/L     (H) 10 - 44 U/L    eGFR 11 (A) >60 mL/min/1.73 m^2    Anion Gap 10 8 - 16 mmol/L   APTT    Collection Time: 04/18/24  7:06 AM   Result Value Ref Range    aPTT 45.6 (H) 21.0 - 32.0 sec   CBC auto differential    Collection Time: 04/18/24  7:06 AM   Result Value Ref Range    WBC 11.31 3.90 - 12.70 K/uL    RBC 3.07 (L) 4.60 - 6.20 M/uL    Hemoglobin 8.4 (L) 14.0 - 18.0 g/dL    Hematocrit 26.5 (L) 40.0 - 54.0 %    MCV 86 82 - 98 fL    MCH 27.4 27.0 - 31.0 pg    MCHC 31.7 (L) 32.0 - 36.0 g/dL    RDW 19.8 (H) 11.5 - 14.5 %    Platelets 333 150 - 450 K/uL    MPV 9.0 (L) 9.2 - 12.9 fL    Immature Granulocytes 1.7 (H) 0.0 - 0.5 %    Gran # (ANC) 9.3 (H) 1.8 - 7.7 K/uL    Immature Grans (Abs) 0.19 (H) 0.00 - 0.04 K/uL    Lymph # 0.9 (L) 1.0 - 4.8 K/uL    Mono # 0.7 0.3 - 1.0 K/uL    Eos # 0.2 0.0 - 0.5 K/uL    Baso # 0.06 0.00 - 0.20 K/uL    nRBC 0 0 /100 WBC    Gran % 82.2 (H) 38.0 - 73.0 %    Lymph % 7.9 (L) 18.0 - 48.0 %    Mono % 6.4 4.0 -  15.0 %    Eosinophil % 1.3 0.0 - 8.0 %    Basophil % 0.5 0.0 - 1.9 %    Differential Method Automated    POCT glucose    Collection Time: 04/18/24  7:17 AM   Result Value Ref Range    POCT Glucose 139 (H) 70 - 110 mg/dL   ]    I/O last 3 completed shifts:  In: 1475.7 [P.O.:360; I.V.:470.6; IV Piggyback:645.1]  Out: 1500 [Other:1500]    Vitals:    04/18/24 0324 04/18/24 0446 04/18/24 0718 04/18/24 0740   BP:  (!) 112/56 (!) 116/53    Pulse: 67 70 65 67   Resp:  20 18    Temp:  97.8 °F (36.6 °C) 98.1 °F (36.7 °C)    TempSrc:  Oral Oral    SpO2:  95% (!) 94%    Weight:       Height:           No Jvd, Thyromegaly or Lymphadenopathy  Lungs: Fairly clear anteriorly and laterally  Cor: RRR no G or rubs  Abd: Soft benign good bowel sounds non tender  Ext: Pos edema R leg infection     A)    ESRD hd mwf   tn  Paf  Cellulitis anemia  2nd hyperpth   Severe hypoalb (1.4)  Calcium 8.1     Consider hypercalcemia       P)    Renal Diet  Home meds  Protect access  HD mwf  EPO prn   Binders prn   Adjust all meds to the degree of renal fx  Close follow up I/O and weights  Maintain Hydration   Wound care and antibiotx as per 1ry

## 2024-04-18 NOTE — PT/OT/SLP PROGRESS
Physical Therapy Treatment    Patient Name:  Miguel Moore   MRN:  [id number]    Recommendations:     Discharge Recommendations: Moderate Intensity Therapy  Discharge Equipment Recommendations: lift device, hospital bed  Barriers to discharge: (Pt is at high risk of falls, readmission and morbidity if d/c home at this time.)     Assessment:     Miguel Moore is a 68 y.o. male admitted with a medical diagnosis of Cellulitis of right foot.  He presents with the following impairments/functional limitations: weakness, impaired endurance, impaired self care skills, impaired functional mobility, gait instability, impaired cognition, impaired balance, decreased coordination, decreased upper extremity function, decreased lower extremity function, pain, decreased safety awareness, decreased ROM, impaired coordination, impaired fine motor, impaired cardiopulmonary response to activity, edema, orthopedic precautions, impaired skin.    Pt still requiring max-total A x2 persons for bed mobility overall    Rehab Prognosis: Fair; patient would benefit from acute skilled PT services to address these deficits and reach maximum level of function.    Recent Surgery: Procedure(s) (LRB):  INCISION AND DRAINAGE, LOWER EXTREMITY- FOOT & ANKLE (Right)  WOUND VAC EXCHANGE (Right) 9 Days Post-Op    Plan:     During this hospitalization, patient to be seen 3 x/week to address the identified rehab impairments via gait training, therapeutic activities, therapeutic exercises and progress toward the following goals:    Plan of Care Expires:  04/17/24    Subjective     Chief Complaint: wanting to eat lunch  Patient/Family Comments/goals: Pt agreed to therapy  Pain/Comfort:  Pain Rating 1: 0/10      Objective:     Patient found HOB elevated with bed alarm, peripheral IV, telemetry, pressure relief boots upon PT entry to room.     General Precautions: Standard, fall, diabetic, respiratory  Orthopedic Precautions: N/A  Braces: N/A  Respiratory  Status: Room air     Functional Mobility:  Bed Mobility:     Rolling Right: maximal assistance  Scooting: dependence  Supine to Sit: total assistance and of 2 persons  Sit to Supine: total assistance and of 2 persons  Balance: Pt with Fair- sitting static balance/ Poor sitting dynamic balance; pt with better static balance this date, however during LE exercises, pt with posterior lean. At times pt able to self correct and bring trunk back over MERON      AM-PAC 6 CLICK MOBILITY  Turning over in bed (including adjusting bedclothes, sheets and blankets)?: 2  Sitting down on and standing up from a chair with arms (e.g., wheelchair, bedside commode, etc.): 2  Moving from lying on back to sitting on the side of the bed?: 2  Moving to and from a bed to a chair (including a wheelchair)?: 2  Need to walk in hospital room?: 1  Climbing 3-5 steps with a railing?: 1  Basic Mobility Total Score: 10       Treatment & Education:  PROM to RLE LAQ, hip flexion, pt performed AROM LLE LAQ and hip flexion 1x10 each    Pt performed ant/post weight shifts 1x10 requiring Min A    Patient left HOB elevated with all lines intact, tray with lunch, call button in reach, bed alarm on, and family present..    GOALS:   Multidisciplinary Problems       Physical Therapy Goals          Problem: Physical Therapy    Goal Priority Disciplines Outcome Goal Variances Interventions   Physical Therapy Goal     PT, PT/OT Ongoing, Not Progressing     Description: Goals to be met by:  2024    Patient will increase functional independence with mobility by performin. Supine to sit with Modified [place]  2. Sit to supine with Modified [place]  3. Sit to stand transfer with Modified [place]  4. Gait  x 100 feet with Modified [place] using Quad Cane.    Recommend: Moderate Intensity Therapy at time of discharge.                             Time Tracking:     PT Received On: 24  PT Start Time: 1348     PT Stop Time: 1411  PT  Total Time (min): 23 min     Billable Minutes: Therapeutic Activity 23    Treatment Type: Treatment  PT/PTA: PTA     Number of PTA visits since last PT visit: 3     04/18/2024

## 2024-04-18 NOTE — NURSING
[facility] [place]  Nurses Note -- 4 Eyes      4/18/2024       Skin assessed on: Q Shift      [x] No Pressure Injuries Present    [x]Prevention Measures Documented    [] Yes LDA  for Pressure Injury Previously documented     [] Yes New Pressure Injury Discovered   [] LDA for New Pressure Injury Added      Attending RN:  Cady Davis RN     Second RN:  DARRION Hills

## 2024-04-19 VITALS
DIASTOLIC BLOOD PRESSURE: 53 MMHG | TEMPERATURE: 98 F | BODY MASS INDEX: 30.21 KG/M2 | RESPIRATION RATE: 18 BRPM | HEART RATE: 79 BPM | WEIGHT: 199.31 LBS | SYSTOLIC BLOOD PRESSURE: 138 MMHG | HEIGHT: 68 IN | OXYGEN SATURATION: 100 %

## 2024-04-19 LAB
ALBUMIN SERPL BCP-MCNC: 1.4 G/DL (ref 3.5–5.2)
ALP SERPL-CCNC: 69 U/L (ref 55–135)
ALT SERPL W/O P-5'-P-CCNC: 70 U/L (ref 10–44)
ANION GAP SERPL CALC-SCNC: 13 MMOL/L (ref 8–16)
APTT PPP: 40.2 SEC (ref 21–32)
AST SERPL-CCNC: 101 U/L (ref 10–40)
BASOPHILS # BLD AUTO: 0.05 K/UL (ref 0–0.2)
BASOPHILS NFR BLD: 0.5 % (ref 0–1.9)
BILIRUB SERPL-MCNC: 0.4 MG/DL (ref 0.1–1)
BUN SERPL-MCNC: 49 MG/DL (ref 8–23)
CALCIUM SERPL-MCNC: 7.4 MG/DL (ref 8.7–10.5)
CHLORIDE SERPL-SCNC: 96 MMOL/L (ref 95–110)
CO2 SERPL-SCNC: 24 MMOL/L (ref 23–29)
CREAT SERPL-MCNC: 6.9 MG/DL (ref 0.5–1.4)
DIFFERENTIAL METHOD BLD: ABNORMAL
EOSINOPHIL # BLD AUTO: 0.2 K/UL (ref 0–0.5)
EOSINOPHIL NFR BLD: 1.5 % (ref 0–8)
ERYTHROCYTE [DISTWIDTH] IN BLOOD BY AUTOMATED COUNT: 19.5 % (ref 11.5–14.5)
EST. GFR  (NO RACE VARIABLE): 8 ML/MIN/1.73 M^2
GLUCOSE SERPL-MCNC: 170 MG/DL (ref 70–110)
HCT VFR BLD AUTO: 25.4 % (ref 40–54)
HGB BLD-MCNC: 7.9 G/DL (ref 14–18)
IMM GRANULOCYTES # BLD AUTO: 0.15 K/UL (ref 0–0.04)
IMM GRANULOCYTES NFR BLD AUTO: 1.4 % (ref 0–0.5)
LYMPHOCYTES # BLD AUTO: 0.9 K/UL (ref 1–4.8)
LYMPHOCYTES NFR BLD: 8.5 % (ref 18–48)
MCH RBC QN AUTO: 26.8 PG (ref 27–31)
MCHC RBC AUTO-ENTMCNC: 31.1 G/DL (ref 32–36)
MCV RBC AUTO: 86 FL (ref 82–98)
MONOCYTES # BLD AUTO: 0.7 K/UL (ref 0.3–1)
MONOCYTES NFR BLD: 6.8 % (ref 4–15)
NEUTROPHILS # BLD AUTO: 8.5 K/UL (ref 1.8–7.7)
NEUTROPHILS NFR BLD: 81.3 % (ref 38–73)
NRBC BLD-RTO: 0 /100 WBC
PLATELET # BLD AUTO: 321 K/UL (ref 150–450)
PMV BLD AUTO: 9.3 FL (ref 9.2–12.9)
POCT GLUCOSE: 133 MG/DL (ref 70–110)
POCT GLUCOSE: 153 MG/DL (ref 70–110)
POCT GLUCOSE: 201 MG/DL (ref 70–110)
POTASSIUM SERPL-SCNC: 4.2 MMOL/L (ref 3.5–5.1)
PROT SERPL-MCNC: 4.4 G/DL (ref 6–8.4)
RBC # BLD AUTO: 2.95 M/UL (ref 4.6–6.2)
SODIUM SERPL-SCNC: 133 MMOL/L (ref 136–145)
VANCOMYCIN SERPL-MCNC: 17.4 UG/ML
WBC # BLD AUTO: 10.47 K/UL (ref 3.9–12.7)

## 2024-04-19 PROCEDURE — 85730 THROMBOPLASTIN TIME PARTIAL: CPT | Mod: HCNC | Performed by: STUDENT IN AN ORGANIZED HEALTH CARE EDUCATION/TRAINING PROGRAM

## 2024-04-19 PROCEDURE — 25000003 PHARM REV CODE 250: Mod: HCNC | Performed by: INTERNAL MEDICINE

## 2024-04-19 PROCEDURE — 25000003 PHARM REV CODE 250: Mod: HCNC | Performed by: ORTHOPAEDIC SURGERY

## 2024-04-19 PROCEDURE — 80202 ASSAY OF VANCOMYCIN: CPT | Mod: HCNC | Performed by: INTERNAL MEDICINE

## 2024-04-19 PROCEDURE — 85025 COMPLETE CBC W/AUTO DIFF WBC: CPT | Mod: HCNC | Performed by: STUDENT IN AN ORGANIZED HEALTH CARE EDUCATION/TRAINING PROGRAM

## 2024-04-19 PROCEDURE — 99232 SBSQ HOSP IP/OBS MODERATE 35: CPT | Mod: HCNC,,,

## 2024-04-19 PROCEDURE — 36415 COLL VENOUS BLD VENIPUNCTURE: CPT | Mod: HCNC | Performed by: INTERNAL MEDICINE

## 2024-04-19 PROCEDURE — 80100016 HC MAINTENANCE HEMODIALYSIS: Mod: HCNC

## 2024-04-19 PROCEDURE — 63600175 PHARM REV CODE 636 W HCPCS: Mod: HCNC | Performed by: ORTHOPAEDIC SURGERY

## 2024-04-19 PROCEDURE — 25000003 PHARM REV CODE 250: Mod: HCNC | Performed by: HOSPITALIST

## 2024-04-19 PROCEDURE — 80053 COMPREHEN METABOLIC PANEL: CPT | Mod: HCNC | Performed by: ORTHOPAEDIC SURGERY

## 2024-04-19 PROCEDURE — 63600175 PHARM REV CODE 636 W HCPCS: Mod: JZ,JG,HCNC | Performed by: INTERNAL MEDICINE

## 2024-04-19 RX ORDER — FERROUS GLUCONATE 324(38)MG
324 TABLET ORAL 2 TIMES DAILY WITH MEALS
Qty: 60 TABLET | Refills: 11 | Status: SHIPPED | OUTPATIENT
Start: 2024-04-19 | End: 2025-04-19

## 2024-04-19 RX ORDER — OXYCODONE AND ACETAMINOPHEN 10; 325 MG/1; MG/1
1 TABLET ORAL EVERY 6 HOURS PRN
Qty: 10 TABLET | Refills: 0 | Status: SHIPPED | OUTPATIENT
Start: 2024-04-19 | End: 2024-05-02 | Stop reason: SDUPTHER

## 2024-04-19 RX ADMIN — DIPHENOXYLATE HYDROCHLORIDE AND ATROPINE SULFATE 1 TABLET: 2.5; .025 TABLET ORAL at 12:04

## 2024-04-19 RX ADMIN — Medication 324 MG: at 08:04

## 2024-04-19 RX ADMIN — EPOETIN ALFA-EPBX 10000 UNITS: 10000 INJECTION, SOLUTION INTRAVENOUS; SUBCUTANEOUS at 08:04

## 2024-04-19 RX ADMIN — ONDANSETRON 4 MG: 2 INJECTION INTRAMUSCULAR; INTRAVENOUS at 06:04

## 2024-04-19 RX ADMIN — FINASTERIDE 5 MG: 5 TABLET, FILM COATED ORAL at 08:04

## 2024-04-19 RX ADMIN — TAMSULOSIN HYDROCHLORIDE 0.8 MG: 0.4 CAPSULE ORAL at 08:04

## 2024-04-19 RX ADMIN — NEPHROCAP 1 CAPSULE: 1 CAP ORAL at 08:04

## 2024-04-19 RX ADMIN — APIXABAN 5 MG: 5 TABLET, FILM COATED ORAL at 11:04

## 2024-04-19 RX ADMIN — PROCHLORPERAZINE EDISYLATE 5 MG: 5 INJECTION INTRAMUSCULAR; INTRAVENOUS at 09:04

## 2024-04-19 NOTE — PROGRESS NOTES
[facility] Surg  Wound Care  [facility]    Patient Name:  Miguel Moore   MRN:  [id number]  Date: 4/19/2024  Diagnosis: Cellulitis of right foot    History:     Past Medical History:   Diagnosis Date    Allergy     Clotting disorder     Elaquis and APlavix    Deep vein thrombosis     Diabetes mellitus, type 2     Hypertension     Nuclear sclerosis of both eyes 6/9/2017    Renal disorder     Seizures     Stroke     Urinary tract infection        Social History     Socioeconomic History    Marital status: Single   Occupational History    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, etc   Tobacco Use    Smoking status: Never    Smokeless tobacco: Never   Substance and Sexual Activity    Alcohol use: No    Drug use: No   Social History Narrative    Lives with daughter       Precautions:     Allergies as of 03/20/2024 - Reviewed 03/20/2024   Allergen Reaction Noted    Ace inhibitors  09/03/2018    Penicillins Hives 01/13/2015    Tizanidine  06/30/2020       [facility] Assessment Details/Treatment     Active Problem List with Overview Notes    Diagnosis Date Noted    Class 1 obesity due to excess calories with serious comorbidity and body mass index (BMI) of 30.0 to 30.9 in adult 04/16/2024    Open wound 03/26/2024    Paroxysmal atrial fibrillation with RVR 03/20/2024    Cellulitis/Abscess  of right foot/ankle 03/20/2024    Bilateral posterior capsular opacification 05/02/2023    Pseudophakia 01/06/2022    History of diabetic ulcer of foot     Elevated PSA 2/18/21 prostate bx normal 02/18/2021 2/18/21 prostate bx normal  1/25/24 MRI prostate PIRADS 2      Pulmonary nodule 1/2021 4 mm nodule. 01/06/2021 1/6/2021 CT abd/pelvis: 4 mm nodule in the right middle lobe      Elevated liver enzymes 01/04/2021    Chronic deep vein thrombosis (DVT) of popliteal vein of right lower extremity 9/21/20 outside US; unprovoked; anticoagulation 09/21/2020    History of fungal infection candida parasilosis hospitalization  8/2020 08/29/2020     -Found to have candidemia - source unclear  -infectious disease consulted, Started on micafungin and now converted to fluconazole  -Repeat cultures negative so far  -Dialysis line removed 8/28.   line replaced on 08/31 after line holiday.  -end date of fluconazole will be 09/11/2020.  Patient has ophthalmology follow-up scheduled on 09/08/2020. Tentative end date 9/11/2020 If no endophthalmitis. If noted to have endophthalmitis during optho visit, would need at least 4-6 weeks of treatment      Anemia in chronic kidney disease 08/26/2020    Nephrotic syndrome 08/16/2020    Type 2 diabetes mellitus with diabetic neuropathy, with long-term current use of insulin 05/10/2018    History of stroke 12/04/2017    CME (cystoid macular edema), bilateral 11/16/2017    Refractive error 11/16/2017    Senile nuclear sclerosis 10/05/2017    Right sided weakness 09/11/2017    Secondary hyperparathyroidism of renal origin 08/03/2017    Vitamin D deficiency 08/03/2017    Nuclear sclerosis, left 06/09/2017    Cortical cataract of both eyes 06/09/2017    DM type 2 without retinopathy 06/09/2017    Microcytosis 9/2019 labs c/w AoCD and AoCKD 03/22/2017     Seen on admission as well 2015; normal Hb, low MCV.  Normal RDW  08/17/2020 EGD normal esophagus.  Discolored nodular and texture change mucosa in the antrum.  Normal duodenum.  Path with foveolar hyperplasia and early xanthoma formation.  H pylori negative.  Mild chronic inflammation without acute activity      ESRD (end stage renal disease) 03/22/2017 2/27/20 renal US with dopplers no ALF.  Medical renal disease  8/2020 renal US: 1. Symmetric diffusely increased cortical echogenicity and elevated resistive indices, nonspecific indicators of chronic medical renal disease.  2. Prostatomegaly.  Some of the provided images are suggestive of a distinct hyperechoic component of the prostate gland, of uncertain etiology or significance, and potentially  artifactual.  Dedicated prostate ultrasound or MRI may be of benefit for further characterization.    Started on HD while hospitalized for progression to ESRD 8/2020  -Continue dialysis per nephrology  -Outpatient HD has been arranged  -Had planned discharge 8/27 if remained afebrile and cultures negative.  Unfortunately his blood culture grew Candida parapsilosis  -Dr. Gomez with ID consulted and case discussed with him.  Started micafungin 8/27 and now on fluconazole.  -Dialysis line removed after HD 8/28 and plan line holiday over weekend.  -new line by IR on 8/31, tolerated dialysis fluid.  -continue outpatient dialysis.      Benign essential HTN 03/21/2017 01/06/2021 TTE mild left atrial enlargement.  LV normal size and systolic function LVEF 55%.  Indeterminate diastolic function.  Mild right atrial enlargement.  Normal RV systolic function.  Normal RV size.  PA pressure 20.  Normal CVP.  Three.      Dyslipidemia 03/21/2017    Controlled type 2 diabetes mellitus with chronic kidney disease on chronic dialysis, with long-term current use of insulin 03/21/2017    BPH (benign prostatic hyperplasia) 2/18/21 prostate bx normal 03/21/2017 6/26/2017 renal US mod prostatomegaly.      Seizure disorder as sequela of cerebrovascular accident 03/21/2017    Hemiplegia and hemiparesis following cerebral infarction affecting right dominant side 03/21/2017      Plans for patient to discharge home. In bed wearing EHOB boots. Noted dressing has been changed on right foot with orders for HH to continue wound care.  Nursing reports the wound on the right foot looks much improved compared to photo from 4/16.  Recommend [facility] for hospital bed due to buttock pressure injury- deep tissue injury and bed mobility with maximum assistance. Informed TN of recommendation for [facility] for bed- bed was delivered to home last week.       04/19/2024

## 2024-04-19 NOTE — NURSING
[facility]  Nurses Note -- 4 Eyes      4/18/2024       Skin assessed on: Q Shift      [] No Pressure Injuries Present    [x]Prevention Measures Documented    [x] Yes LDA  for Pressure Injury Previously documented     [] Yes New Pressure Injury Discovered   [] LDA for New Pressure Injury Added      Attending RN:  Cady Davis RN     Second RN:  Lizzy RN

## 2024-04-19 NOTE — PROGRESS NOTES
Pharmacokinetic Assessment Follow Up: IV Vancomycin    Vancomycin serum concentration assessment(s):    The random level was drawn correctly and can be used to guide therapy at this time. The measurement is within the desired definitive target range of 10 to 20 mcg/mL.    Vancomycin Regimen Plan:    500 mg 1x today 4/19/24 post dialysis  Re-dose when the random level is less than 20 mcg/mL, next level to be drawn at 0400 on 4/22/24    Drug levels (last 3 results):  Recent Labs   Lab Result Units 04/17/24  0645 04/19/24  0517   Vancomycin, Random ug/mL 13.0 17.4       Pharmacy will continue to follow and monitor vancomycin.    Please contact pharmacy at extension [phone] for questions regarding this assessment.    Thank you for the consult,   Traci Tong       Patient brief summary:  Miguel Moore is a 68 y.o. male initiated on antimicrobial therapy with IV Vancomycin for treatment of bone/joint infection        Drug Allergies:   Review of patient's allergies indicates:   Allergen Reactions    Ace inhibitors      Hyperkalemia 8/2018  Other reaction(s): Unknown    Penicillins Hives     Tolerated cefepime and cefdinir previously    Tizanidine      Felt hot       Actual Body Weight:   90.4 kg     Renal Function:   Estimated Creatinine Clearance: 11 mL/min (A) (based on SCr of 6.9 mg/dL (H)).,     Dialysis Method (if applicable):  intermittent HD    CBC (last 72 hours):  Recent Labs   Lab Result Units 04/16/24  0824 04/17/24  0645 04/18/24  0706 04/19/24  0517   WBC K/uL 13.30* 11.26  11.26 11.31 10.47   Hemoglobin g/dL 9.0* 8.0*  8.0* 8.4* 7.9*   Hematocrit % 28.0* 25.3*  25.3* 26.5* 25.4*   Platelets K/uL 325 339  339 333 321   Gran % % 85.6* 81.8*  81.8* 82.2* 81.3*   Lymph % % 6.0* 9.5*  9.5* 7.9* 8.5*   Mono % % 5.6 5.1  5.1 6.4 6.8   Eosinophil % % 0.8 1.2  1.2 1.3 1.5   Basophil % % 0.6 0.6  0.6 0.5 0.5   Differential Method  Automated Automated  Automated Automated Automated       Metabolic Panel  (last 72 hours):  Recent Labs   Lab Result Units 04/17/24  0645 04/18/24  0706 04/19/24  0517   Sodium mmol/L 134* 134* 133*   Potassium mmol/L 4.9 4.1 4.2   Chloride mmol/L 97 97 96   CO2 mmol/L 24 27 24   Glucose mg/dL 156* 144* 170*   BUN mg/dL 60* 37* 49*   Creatinine mg/dL 7.7* 5.5* 6.9*   Albumin g/dL 1.4* 1.4* 1.4*   Total Bilirubin mg/dL 0.4 0.5 0.4   Alkaline Phosphatase U/L 71 72 69   AST U/L 142* 120* 101*   ALT U/L 126* 113* 70*       Vancomycin Administrations:  vancomycin given in the last 96 hours                     vancomycin 750 mg in dextrose 5 % (D5W) 250 mL IVPB (Vial-Mate) (mg) 750 mg New Bag 04/17/24 1606    vancomycin (VANCOCIN) 500 mg in dextrose 5 % in water (D5W) 100 mL IVPB (MB+) (mg) 500 mg New Bag 04/15/24 1601                    Microbiologic Results:  Microbiology Results (last 7 days)       Procedure Component Value Units Date/Time    Blood culture [[id number]] Collected: 04/13/24 1237    Order Status: Completed Specimen: Blood Updated: 04/17/24 1503     Blood Culture, Routine No Growth after 4 days.

## 2024-04-19 NOTE — PLAN OF CARE
04/19/24 0858   Medicare Message   Important Message from Medicare regarding Discharge Appeal Rights Given to patient/caregiver;Explained to patient/caregiver;Signed/date by patient/caregiver;Other (comments)   Date IMM was signed 04/19/24   Time IMM was signed 0858     [facility] mail [phone]

## 2024-04-19 NOTE — PLAN OF CARE
Recommendations    1. Continue renal diet  2. Monitor PO intake of meals and snacks  3. Continue to monitor weights and labs.   4. Collaboration with medical providers    Goals: 1. Pt to meet % EEN/EPN by RD follow up  Nutrition Goal Status: continues  Communication of RD Recs:  (POC)    Assessment and Plan    Nutrition Problem  diabetes    Related to (etiology):   DM    Signs and Symptoms (as evidenced by):   Hemoglobin A1C   Date Value Ref Range Status   03/20/2024 5.8 (H) 4.0 - 5.6 % Final     Comment:     ADA Screening Guidelines:  5.7-6.4%  Consistent with prediabetes  >or=6.5%  Consistent with diabetes    High levels of fetal hemoglobin interfere with the HbA1C  assay. Heterozygous hemoglobin variants (HbS, HgC, etc)do  not significantly interfere with this assay.   However, presence of multiple variants may affect accuracy.     10/26/2023 5.9 (H) 4.0 - 5.6 % Final     Comment:     ADA Screening Guidelines:  5.7-6.4%  Consistent with prediabetes  >or=6.5%  Consistent with diabetes    High levels of fetal hemoglobin interfere with the HbA1C  assay. Heterozygous hemoglobin variants (HbS, HgC, etc)do  not significantly interfere with this assay.   However, presence of multiple variants may affect accuracy.     04/27/2023 5.9 (H) 4.0 - 5.6 % Final     Comment:     ADA Screening Guidelines:  5.7-6.4%  Consistent with prediabetes  >or=6.5%  Consistent with diabetes    High levels of fetal hemoglobin interfere with the HbA1C  assay. Heterozygous hemoglobin variants (HbS, HgC, etc)do  not significantly interfere with this assay.   However, presence of multiple variants may affect accuracy.           Interventions/Recommendations (treatment strategy):  Collaboration with medical providers    Nutrition Diagnosis Status:   continues

## 2024-04-19 NOTE — PT/OT/SLP PROGRESS
Physical Therapy      Patient Name:  Miguel [place]   MRN:  [id number]    Patient not seen today secondary to Dialysis. Will follow-up.

## 2024-04-19 NOTE — DISCHARGE SUMMARY
[facility] Medicine  Discharge Summary      Patient Name: Miguel [place]  MRN: [id number]  Patient Class: IP- Inpatient  Admission Date: 3/20/2024  Hospital Length of Stay: 30 days  Discharge Date and Time:  04/19/2024 12:55 PM  Attending Physician: Rosalva Manning MD   Discharging Provider: Rosalva Manning MD  Primary Care Provider: Raciel Raymond MD      HPI:   Mr Miguel [place] is a 68 y.o. man with ESRD on HD, HTN, DM with neuropathy, history of CVA with residual R sided weakness, chronic DVT on eliquis who presented with feeling poorly. He attended his usual dialysis session on Monday 3/18 without issues. He developed fatigue and R ankle swelling. He has some pain with palpation but is able to walk. No reports of trauma. No wounds. No falls. No history of gout. Today he was feeling worse so did not go to dialysis and came to the ED. No fevers. No shortness of breath. Normal appetite. No nausea, vomiting. No chest pain. No palpitations.     In the ED, afebrile with HR initially controlled then to 130s Afib RVR. Started diltiazem but became hypotensive. Diltiazem stopped. Given antibiotics. Admitted for further management.     Procedure(s) (LRB):  INCISION AND DRAINAGE, LOWER EXTREMITY- FOOT & ANKLE (Right)  WOUND VAC EXCHANGE (Right)      Hospital Course:    68 year old man with ESRD on HD, DM and CVA with R sided weakness admitted on 3/21 for fatigue, hyperkalemia, found to have Afib with RVR. Started amio gtt with conversion to NSR. TTE EF 55-60%, mild LAE. Cardiology consulted. Transitioned on PO amiodarone and continues home eliquis (on this for chronic DVT). He received HD with resolution of hyperkalemia. Transferred out the ICU on 03/26. CT ankle from 3/28 suggestive of cellulitis, no evidence of osteomyelitis, pt had new drainage from R ankle and was found to have an abscess. Orthopedic surgery consulted. Pt S/p I&D 4/4 and 4/6 with OR cx with Ecoli. Operative noted on 4/6 notable  "for necrotic tissue around distal fibula. Interval improvement in fevers since surgery; however, has leukocytosis that is slowly improving. ID consulted-empiric MRSA coverage added given hx of MRSA. Orthopedic discontinued wound vac on 04/16. PT/OT recommends moderate intensity therapy, but patient declines SNF. Will need home health on discharge. He has elevated liver enzymes - unclear etiology at this time, he is on multiple medications that can cause hepatotoxicity (amio, atorvastatin, abx, etc). Hepatitis panel nonreactive. US abdomen with Hepatosplenomegaly. Blcx so far unrevealing. GI consulted. Amiodarone and statin held. LFTs started improving. Continue to hold statins at the time of dc till f/u with PCP. Wound vac was removed by ortho. Pt dc home with HH, IV abx with HD (vanc and ancef), wound care and OP fu with ID, ortho, pcp.      Goals of Care Treatment Preferences:  Code Status: Full Code       LaPOST: Yes           Consults:   Consults (From admission, onward)          Status Ordering Provider     Inpatient virtual consult to Hospital Medicine  Once        Provider:  Andres Peacock MD    Completed TORSTEN CLARK T.     Inpatient consult to Gastroenterology  Once        Provider:  Bren Ovalles NP    Completed TORSTEN CLARK.     Pharmacy to dose Vancomycin consult  Once        Provider:  (Not yet assigned)   Placed in "And" Linked Group    Acknowledged LUANNE BHAT     Inpatient consult to Orthopedic Surgery  Once        Provider:  Desmond Bairllas MD    Completed FÁTIMA SWANSON     Inpatient consult to Infectious Diseases  Once        Provider:  Laney Underwood MD    Completed DANNY STEVENSON     Inpatient consult to Social Work  Once        Provider:  (Not yet assigned)    Completed ANNABEL COVARRUBIAS     Inpatient consult to Cardiology  Once        Provider:  Ludy Munoz MD    Completed CARL SPANN     Inpatient consult to Cardiology  Once        Provider:  Efrain, " Elmer WATERS MD    Completed CARL SPANN     Inpatient consult to Nephrology  Once        Provider:  Adrian Millard MD    Acknowledged JONG DOWNEY III            No new Assessment & Plan notes have been filed under this hospital service since the last note was generated.  Service: Hospital Medicine    Final Active Diagnoses:    Diagnosis Date Noted POA    PRINCIPAL PROBLEM:  Cellulitis/Abscess  of right foot/ankle [L03.115] 03/20/2024 Yes    Class 1 obesity due to excess calories with serious comorbidity and body mass index (BMI) of 30.0 to 30.9 in adult [E66.09, Z68.30] 04/16/2024 Not Applicable    Open wound [T14.8XXA] 03/26/2024 Yes    Paroxysmal atrial fibrillation with RVR [I48.0] 03/20/2024 Yes    Elevated liver enzymes [R74.8] 01/04/2021 Yes    Chronic deep vein thrombosis (DVT) of popliteal vein of right lower extremity 9/21/20 outside US; unprovoked; anticoagulation [I82.531] 09/21/2020 Yes    Anemia in chronic kidney disease [N18.9, D63.1] 08/26/2020 Yes    History of stroke [Z86.73] 12/04/2017 Not Applicable    Secondary hyperparathyroidism of renal origin [N25.81] 08/03/2017 Yes    ESRD (end stage renal disease) [N18.6] 03/22/2017 Yes    Benign essential HTN [I10] 03/21/2017 Yes     Chronic    Controlled type 2 diabetes mellitus with chronic kidney disease on chronic dialysis, with long-term current use of insulin [E11.22, N18.6, Z79.4, Z99.2] 03/21/2017 Not Applicable    Dyslipidemia [E78.5] 03/21/2017 Yes    Hemiplegia and hemiparesis following cerebral infarction affecting right dominant side [I69.351] 03/21/2017 Not Applicable     Chronic    Seizure disorder as sequela of cerebrovascular accident [I69.398, G40.909] 03/21/2017 Not Applicable     Chronic      Problems Resolved During this Admission:    Diagnosis Date Noted Date Resolved POA    Fever [R50.9] 03/29/2024 04/08/2024 No    Hyperkalemia [E87.5] 08/18/2020 03/21/2024 Yes       Discharged Condition: stable    Disposition:  "    Follow Up:   Follow-up Information       Jimbo Montilla III, MD Follow up.    Specialty: Orthopedic Surgery  Contact information:  [address] [facility]  [address]  [place] [address]  [phone]               Yulisa Nath Follow up.    Specialty: Dialysis Center  Why: MWF  Contact information:  [address]  [place] [address]  [phone]               [facility] Follow up.    Specialties: Home Health Services, Home Therapy Services, Home Living Aide Services  Contact information:  [address]  [place] [address]  [phone]             [facility] Follow up.    Specialty: DME Provider  Why: [facility]  hospital bed  Contact information:  [address]  [address]  [facility] [address]  [phone]               [facility] Follow up.    Why: requesting wound vac for home use  Contact information:  [address]  [address]  [place] [address]  [phone]                         Patient Instructions:      HOSPITAL BED FOR HOME USE     Order Specific Question Answer Comments   Type: Semi-electric    Length of need (1-99 months): 99    Does patient have medical equipment at home? wheelchair    Does patient have medical equipment at home? cane, straight    Does patient have medical equipment at home? [facility]    Height: 5' 8" (1.727 m)    Weight: 90.4 kg (199 lb 4.7 oz)    Accessories: Gel overlay mattress    Please check all that apply: Patient requires positioning of the body in ways not feasible in an ordinary bed due to a medical condition which is expected to last at least one month.    Please check all that apply: Patient requires, for the alleviation of pain, positioning of the body in ways not feasible in an ordinary bed.    Please check all that apply: Patient requires the head of bed to be elevated more than 30 degrees most of the time due to congestive heart failure, chronic pulmonary disease, or aspiration.  Pillows and " "wedges have been considered and ruled out.    Please check all that apply: Patient requires a bed height different than a fixed height hospital bed to permit transfers to chair, wheelchair, or standing.    Please check all that apply: Patient requires frequent changes in body position and/or has an immediate need for a change in body position.      WHEELCHAIR FOR HOME USE     Order Specific Question Answer Comments   Hours in W/C per day: 10    Type of Wheelchair: Standard    Size(Width): 18"(STD adult)    Leg Support: STD footrests    Lap Belt: Velcro    Accessories: Front brakes    Cushion: Basic    Height: 5' 8" (1.727 m)    Weight: 90.4 kg (199 lb 4.7 oz)    Does patient have medical equipment at home? wheelchair    Does patient have medical equipment at home? cane, straight    Does patient have medical equipment at home? grab bar    Length of need (1-99 months): 99    Please check all that apply: Caregiver is capable and willing to operate wheelchair safely.    Please check all that apply: Patient's upper body strength is sufficient for propulsion.    Reclining Back No      PATIENT (KATHIE) LIFT FOR HOME USE     Order Specific Question Answer Comments   Height: 5' 8" (1.727 m)    Weight: 90.4 kg (199 lb 4.7 oz)    Does patient have medical equipment at home? wheelchair    Does patient have medical equipment at home? cane, straight    Does patient have medical equipment at home? grab bar    Length of need (1-99 months): 99      ACCEPT - Ambulatory referral/consult to Cardiac Electrophysiology   Standing Status: Future   Referral Priority: Routine Referral Type: Consultation   Referral Reason: Specialty Services Required   Requested Specialty: Cardiology   Number of Visits Requested: 1     Ambulatory referral/consult to Infectious Disease   Standing Status: Future   Referral Priority: Routine Referral Type: Consultation   Referral Reason: Specialty Services Required   Requested Specialty: Infectious Diseases "   Number of Visits Requested: 1     Ambulatory referral/consult to Orthopedics   Standing Status: Future   Referral Priority: Routine Referral Type: Consultation   Requested Specialty: Orthopedic Surgery   Number of Visits Requested: 1     Diet Cardiac     Notify your health care provider if you experience any of the following:  temperature >100.4     Notify your health care provider if you experience any of the following:  persistent nausea and vomiting or diarrhea     Notify your health care provider if you experience any of the following:  severe uncontrolled pain     Notify your health care provider if you experience any of the following:  redness, tenderness, or signs of infection (pain, swelling, redness, odor or green/yellow discharge around incision site)     Notify your health care provider if you experience any of the following:  difficulty breathing or increased cough     Notify your health care provider if you experience any of the following:  severe persistent headache     Notify your health care provider if you experience any of the following:  worsening rash     Notify your health care provider if you experience any of the following:  persistent dizziness, light-headedness, or visual disturbances     Notify your health care provider if you experience any of the following:  increased confusion or weakness     Send follow up & questions to   Order Comments: Patient's primary care physician: Raciel Raymond MD     Activity as tolerated       Significant Diagnostic Studies: Labs: CMP   Recent Labs   Lab 04/18/24  0706 04/19/24  0517   * 133*   K 4.1 4.2   CL 97 96   CO2 27 24   * 170*   BUN 37* 49*   CREATININE 5.5* 6.9*   CALCIUM 8.2* 7.4*   PROT 5.2* 4.4*   ALBUMIN 1.4* 1.4*   BILITOT 0.5 0.4   ALKPHOS 72 69   * 101*   * 70*   ANIONGAP 10 13    and CBC   Recent Labs   Lab 04/18/24  0706 04/19/24  0517   WBC 11.31 10.47   HGB 8.4* 7.9*   HCT 26.5* 25.4*    321        Pending Diagnostic Studies:       None           Medications:  Reconciled Home Medications:      Medication List        START taking these medications      dextrose 5 % in water (D5W) PgBk 50 mL with ceFAZolin 1 gram SolR  Ancef 2g/2g/3g after HD on HD days     ferrous gluconate 324 MG tablet  Commonly known as: FERGON  Take 1 tablet (324 mg total) by mouth 2 (two) times daily with meals.     oxyCODONE-acetaminophen  mg per tablet  Commonly known as: PERCOCET  Take 1 tablet by mouth every 6 (six) hours as needed for Pain.     TRIPHROCAPS 1 mg Cap  Generic drug: vitamin renal formula (B-complex-vitamin c-folic acid)  Take 1 capsule by mouth once daily.  Start taking on: April 20, 2024     VANCOMYCIN 500 MG/100 ML D5W (READY TO MIX)  Vancomycin 500mg IV after HD on HD days (M/W/F)            CONTINUE taking these medications      apixaban 5 mg Tab  Commonly known as: ELIQUIS  Take 1 tablet (5 mg total) by mouth 2 (two) times daily.     aspirin 81 MG EC tablet  Commonly known as: ECOTRIN  Take 1 tablet (81 mg total) by mouth once daily.     atorvastatin 80 MG tablet  Commonly known as: LIPITOR  Take 1 tablet (80 mg total) by mouth once daily.     finasteride 5 mg tablet  Commonly known as: PROSCAR  Take 1 tablet (5 mg total) by mouth once daily.     iron sucrose in  mg/100 mL Pgbk  Commonly known as: VENOFER  50 mg.     labetaloL 100 MG tablet  Commonly known as: NORMODYNE  Take 1 tablet (100 mg total) by mouth once daily.     MIRCERA INJ  75 mcg.     mupirocin 2 % ointment  Commonly known as: BACTROBAN  Apply to affected area 3 times daily     RENVELA 800 mg Tab  Generic drug: sevelamer carbonate  Take 1 tablet (800 mg total) by mouth 3 (three) times daily with meals.     tamsulosin 0.4 mg Cap  Commonly known as: FLOMAX  Take 2 capsules (0.8 mg total) by mouth once daily.            STOP taking these medications      amLODIPine 10 MG tablet  Commonly known as: NORVASC     sildenafiL 100 MG  tablet  Commonly known as: VIAGRA              Indwelling Lines/Drains at time of discharge:   Lines/Drains/Airways       Drain  Duration                  Hemodialysis AV Fistula Left upper arm -- days                    Time spent on the discharge of patient: 39 minutes         The attending portion of this evaluation, treatment, and documentation was performed per Rosalva Manning MD via Telemedicine AudioVisual using the secure [employer] software platform with 2 way audio/video. The provider was located off-site and the patient is located in the hospital. The aforementioned video software was utilized to document the relevant history and physical exam    Rosalva Manning MD  Department of Hospital Medicine  [facility]

## 2024-04-19 NOTE — PLAN OF CARE
Problem: Adult Inpatient Plan of Care  Goal: Plan of Care Review  4/19/2024 1808 by Marielle Bay RN  Outcome: Met  4/19/2024 1647 by Marielle Bay RN  Outcome: Ongoing, Progressing  Goal: Patient-Specific Goal (Individualized)  4/19/2024 1808 by Marielle Bay RN  Outcome: Met  4/19/2024 1647 by Marielle Bay RN  Outcome: Ongoing, Progressing  Goal: Absence of Hospital-Acquired Illness or Injury  4/19/2024 1808 by Marielle Bay RN  Outcome: Met  4/19/2024 1647 by Marielle Bay RN  Outcome: Ongoing, Progressing  Goal: Optimal Comfort and Wellbeing  4/19/2024 1808 by Marielle Bay RN  Outcome: Met  4/19/2024 1647 by Marielle Bay RN  Outcome: Ongoing, Progressing  Goal: Readiness for Transition of Care  4/19/2024 1808 by Marielle Bay RN  Outcome: Met  4/19/2024 1647 by Marielle Bay RN  Outcome: Ongoing, Progressing     Problem: Infection  Goal: Absence of Infection Signs and Symptoms  4/19/2024 1808 by Marielle Bay RN  Outcome: Met  4/19/2024 1647 by Marielle Bay RN  Outcome: Ongoing, Progressing     Problem: Diabetes Comorbidity  Goal: Blood Glucose Level Within Targeted Range  4/19/2024 1808 by Marielle Bay RN  Outcome: Met  4/19/2024 1647 by Marielle Bay RN  Outcome: Ongoing, Progressing     Problem: Device-Related Complication Risk (Hemodialysis)  Goal: Safe, Effective Therapy Delivery  4/19/2024 1808 by Marielle Bay RN  Outcome: Met  4/19/2024 1647 by Marielle Bay RN  Outcome: Ongoing, Progressing     Problem: Hemodynamic Instability (Hemodialysis)  Goal: Effective Tissue Perfusion  4/19/2024 1808 by Marielle Bay RN  Outcome: Met  4/19/2024 1647 by Marielle Bay RN  Outcome: Ongoing, Progressing     Problem: Infection (Hemodialysis)  Goal: Absence of Infection Signs and Symptoms  4/19/2024 1808 by Afaneh, Heba, RN  Outcome: Met  4/19/2024 1647 by Marielle Bay, DARRION  Outcome: Ongoing, Progressing     Problem: Skin Injury Risk Increased  Goal: Skin Health and Integrity  4/19/2024 1808 by Marielle Bay,  RN  Outcome: Met  4/19/2024 1647 by Marielle Bay RN  Outcome: Ongoing, Progressing     Problem: Impaired Wound Healing  Goal: Optimal Wound Healing  4/19/2024 1808 by Marielle Bay RN  Outcome: Met  4/19/2024 1647 by Marielle Bay RN  Outcome: Ongoing, Progressing     Problem: Fall Injury Risk  Goal: Absence of Fall and Fall-Related Injury  4/19/2024 1808 by Marielle Bay RN  Outcome: Met  4/19/2024 1647 by Marielle Bay RN  Outcome: Ongoing, Progressing     Problem: Pain Acute  Goal: Acceptable Pain Control and Functional Ability  4/19/2024 1808 by Marielle Bay RN  Outcome: Met  4/19/2024 1647 by Marielle Bay RN  Outcome: Ongoing, Progressing

## 2024-04-19 NOTE — PLAN OF CARE
Case Management Final Discharge Note    Discharge Disposition: home    New DME ordered / company name: hospital bed and soha lift    Relevant SDOH / Transition of Care Barriers:  none    Primary Caretaker and contact information: Maddy Valdes (Daughter)  [phone] (Mobile)     Scheduled followup appointment:    Follow-up Information       Jimbo Montilla III, MD. Call.    Specialty: Orthopedic Surgery  Why: office closed early today friday will  call patient daughter on Monday 4/22 to schedule an appointment [facility] follow up  Contact information:  [address] [facility]  [address]  [place] [address]  [phone]               Yulisa Gaffney Follow up on 4/22/2024.    Specialty: Dialysis Center  Why: MWF  Contact information:  [address] [place] [address]  [phone]               [facility] Follow up.    Specialty: DME Provider  Why: DME  hospital bed and soha lift to home  Contact information:  [address] [facility]  [address]  [facility] [address]  [phone]               [facility] Follow up on 4/20/2024.    Specialty: Home Health Services  Why: Home Health  Contact information:  [address] [facility] [address]  [phone]               [facility] Follow up on 4/22/2024.    Why: out patient services  8:45 AM Ambulance pickup to [address] [place] [address]  [place] [address]  . Nurse at HD to call [facility] an hour before HD is finished for   Contact information:  [phone]    TO [facility] on Monday 4/22/20224                             Referrals placed: sent    Transportation: [facility] to home after HD.        Patient and family educated on discharge services and updated on DC plan. Heba/Bedside RN notified, patient clear to discharge from Case Management Perspective.    04/19/24 1642   Final Note   Assessment Type Final Discharge Note   Anticipated Discharge Disposition Home-Health   What phone number can  be called within the next 1-3 days to see how you are doing after discharge?   (see chart)   Hospital Resources/Appts/Education Provided Provided patient/caregiver with written discharge plan information;Appointments scheduled and added to AVS   Post-Acute Status   Post-Acute Authorization HME;Dialysis;Home Health   Post-Acute Placement Status Patient declined/refused   HME Status Set-up Complete/Auth obtained   Home Health Status Set-up Complete/Auth obtained   Diaylsis Status Set-up Complete/Auth obtained   Patient choice form signed by patient/caregiver List with quality metrics by geographic area provided   Discharge Delays None known at this time

## 2024-04-19 NOTE — PLAN OF CARE
[facility] Surg    HOME HEALTH ORDERS  FACE TO FACE ENCOUNTER    Patient Name: Miguel Moore  YOB: 1955    PCP: Raciel Raymond MD   PCP Address: [address]  PCP Phone Number: [phone]  PCP Fax: [phone]    Encounter Date: 04/19/2024    Admit to Home Health    Diagnoses:  Active Hospital Problems    Diagnosis  POA    *Cellulitis/Abscess  of right foot/ankle [L03.115]  Yes    Class 1 obesity due to excess calories with serious comorbidity and body mass index (BMI) of 30.0 to 30.9 in adult [E66.09, Z68.30]  Not Applicable    Open wound [T14.8XXA]  Yes    Paroxysmal atrial fibrillation with RVR [I48.0]  Yes    Elevated liver enzymes [R74.8]  Yes    Chronic deep vein thrombosis (DVT) of popliteal vein of right lower extremity 9/21/20 outside US; unprovoked; anticoagulation [I82.531]  Yes    Anemia in chronic kidney disease [N18.9, D63.1]  Yes    History of stroke [Z86.73]  Not Applicable    Secondary hyperparathyroidism of renal origin [N25.81]  Yes    ESRD (end stage renal disease) [N18.6]  Yes     2/27/20 renal US with dopplers no ALF.  Medical renal disease  8/2020 renal US: 1. Symmetric diffusely increased cortical echogenicity and elevated resistive indices, nonspecific indicators of chronic medical renal disease.  2. Prostatomegaly.  Some of the provided images are suggestive of a distinct hyperechoic component of the prostate gland, of uncertain etiology or significance, and potentially artifactual.  Dedicated prostate ultrasound or MRI may be of benefit for further characterization.    Started on HD while hospitalized for progression to ESRD 8/2020  -Continue dialysis per nephrology  -Outpatient HD has been arranged  -Had planned discharge 8/27 if remained afebrile and cultures negative.  Unfortunately his blood culture grew Candida parapsilosis  -Dr. Gomez with ID consulted and case discussed with him.  Started micafungin 8/27 and now on  fluconazole.  -Dialysis line removed after HD 8/28 and plan line holiday over weekend.  -new line by IR on 8/31, tolerated dialysis fluid.  -continue outpatient dialysis.      Benign essential HTN [I10]  Yes     Chronic     01/06/2021 TTE mild left atrial enlargement.  LV normal size and systolic function LVEF 55%.  Indeterminate diastolic function.  Mild right atrial enlargement.  Normal RV systolic function.  Normal RV size.  PA pressure 20.  Normal CVP.  Three.      Controlled type 2 diabetes mellitus with chronic kidney disease on chronic dialysis, with long-term current use of insulin [E11.22, N18.6, Z79.4, Z99.2]  Not Applicable    Dyslipidemia [E78.5]  Yes    Hemiplegia and hemiparesis following cerebral infarction affecting right dominant side [I69.351]  Not Applicable     Chronic    Seizure disorder as sequela of cerebrovascular accident [I69.398, G40.909]  Not Applicable     Chronic      Resolved Hospital Problems    Diagnosis Date Resolved POA    Fever [R50.9] 04/08/2024 No    Hyperkalemia [E87.5] 03/21/2024 Yes       Future Appointments   Date Time Provider Department Center   4/26/2024  2:20 PM Raciel Raymond MD [facility] FAM MED [place]   5/2/2024 10:40 AM Jefe Frankel MD [facility] PHYSMED [address]   5/22/2024 11:00 AM INFECTIOUS DISEASE, [facility] INFECTD [facility]   5/23/2024 10:00 AM Chiquita Butterfield MD [facility] RHEUM [address]   6/6/2024  8:45 AM Sabi Douglas DPM [facility] POD [place]      Follow-up Information       Jimbo Montilla III, MD Follow up.    Specialty: Orthopedic Surgery  Contact information:  [address]  [address]  [place] [address] [phone]               Yulisa Nath Follow up.    Specialty: Dialysis Center  Why: MWF  Contact information:  [address]  [place] [address] [phone]               [facility] Follow up.    Specialties: Home Health Services, Home Therapy Services, Home Living Aide Services  Contact  information:  [address]  [facility] [address]  [phone]             D[facility], [facility] Follow up.    Specialty: DME Provider  Why: DME  hospital bed  Contact information:  [address]  [address]  [facility] [address]  [phone]               [facility] Follow up.    Why: requesting wound vac for home use  Contact information:  [address]  [address]  [facility] [address]  [phone]                             I have seen and examined this patient face to face today. My clinical findings that support the need for the home health skilled services and home bound status are the following:  Weakness/numbness causing balance and gait disturbance due to Infection and Weakness/Debility making it taxing to leave home.    Allergies:  Review of patient's allergies indicates:   Allergen Reactions    Ace inhibitors      Hyperkalemia 8/2018  Other reaction(s): Unknown    Penicillins Hives     Tolerated cefepime and cefdinir previously    Tizanidine      Felt hot       Diet: renal diet    Activities: activity as tolerated    Nursing:   SN to complete comprehensive assessment including routine vital signs. Instruct on disease process and s/s of complications to report to MD. Review/verify medication list sent home with the patient at time of discharge  and instruct patient/caregiver as needed. Frequency may be adjusted depending on start of care date.    Notify MD if SBP > 160 or < 90; DBP > 90 or < 50; HR > 120 or < 50; Temp > 101;       CONSULTS:    Physical Therapy to evaluate and treat. Evaluate for home safety and equipment needs; Establish/upgrade home exercise program. Perform / instruct on therapeutic exercises, gait training, transfer training, and Range of Motion.  Occupational Therapy to evaluate and treat. Evaluate home environment for safety and equipment needs. Perform/Instruct on transfers, ADL training, ROM, and therapeutic exercises.  Aide to provide assistance with  personal care, ADLs, and vital signs.    MISCELLANEOUS CARE:  Routine Skin for Bedridden Patients: Instruct patient/caregiver to apply moisture barrier cream to all skin folds and wet areas in perineal area daily and after baths and all bowel movements.    WOUND CARE ORDERS  Local wound care to burn right upper thigh- Cleanse with Vashe. Apply 3M Cavilon No Sting Film Barrier to periwound skin. Cover with hydrocolloid dressing.    Local wound care to right buttock Stage 2 pressure injury every 3 days and prn- Cleanse with Vashe. Apply 3M Cavilon No Sting Film Barrier to periwound skin. Dress with hydrocolloid dressing.    Local wound care to right lateral ankle and heel every shift- Cleanse wound with Vashe. Apply 3M Cavilon No Sting Film Barrier to periwound skin. Fill 3 incisions on lateral ankle with Vashe moistened gauze. Do NOT put Vashe on right heel wound. Apply dry dressing over incisions and over eschar on right heel. Secure with Kerlix roll. Keep right heel suspended off bed in EHOB boot at all times while in bed.      Medications: Review discharge medications with patient and family and provide education.      Current Discharge Medication List        START taking these medications    Details   dextrose 5 % in water (D5W) PgBk 50 mL with ceFAZolin 1 gram SolR Ancef 2g/2g/3g after HD on HD days      ferrous gluconate (FERGON) 324 MG tablet Take 1 tablet (324 mg total) by mouth 2 (two) times daily with meals.  Qty: 60 tablet, Refills: 11      oxyCODONE-acetaminophen (PERCOCET)  mg per tablet Take 1 tablet by mouth every 6 (six) hours as needed for Pain.  Qty: 10 tablet, Refills: 0    Comments: Quantity prescribed more than 7 day supply? No      vancomycin HCl (VANCOMYCIN 500 MG/100 ML D5W, READY TO MIX,) Vancomycin 500mg IV after HD on HD days (M/W/F)      vitamin renal formula, B-complex-vitamin c-folic acid, (NEPHROCAP) 1 mg Cap Take 1 capsule by mouth once daily.  Qty: 30 capsule, Refills: 11            CONTINUE these medications which have NOT CHANGED    Details   apixaban (ELIQUIS) 5 mg Tab Take 1 tablet (5 mg total) by mouth 2 (two) times daily.  Qty: 180 tablet, Refills: 3    Associated Diagnoses: Acute deep vein thrombosis (DVT) of popliteal vein of right lower extremity      aspirin (ECOTRIN) 81 MG EC tablet Take 1 tablet (81 mg total) by mouth once daily.  Qty: 90 tablet, Refills: 3    Comments: Stop plavix  Associated Diagnoses: History of stroke      finasteride (PROSCAR) 5 mg tablet Take 1 tablet (5 mg total) by mouth once daily.  Qty: 90 tablet, Refills: 3    Associated Diagnoses: Benign non-nodular prostatic hyperplasia without lower urinary tract symptoms      iron sucrose in NS (VENOFER) 100 mg/100 mL PgBk 50 mg.      labetaloL (NORMODYNE) 100 MG tablet Take 1 tablet (100 mg total) by mouth once daily.  Qty: 90 tablet, Refills: 3    Comments: .  Associated Diagnoses: Benign essential HTN      methoxy peg-epoetin beta (MIRCERA INJ) 75 mcg.      mupirocin (BACTROBAN) 2 % ointment Apply to affected area 3 times daily  Qty: 22 g, Refills: 1    Associated Diagnoses: Nail abnormality      RENVELA 800 mg Tab Take 1 tablet (800 mg total) by mouth 3 (three) times daily with meals.  Qty: 90 tablet, Refills: 0    Associated Diagnoses: ESRD (end stage renal disease)      tamsulosin (FLOMAX) 0.4 mg Cap Take 2 capsules (0.8 mg total) by mouth once daily.  Qty: 180 capsule, Refills: 3    Associated Diagnoses: Benign non-nodular prostatic hyperplasia without lower urinary tract symptoms           STOP taking these medications       amLODIPine (NORVASC) 10 MG tablet Comments:   Reason for Stopping:         atorvastatin (LIPITOR) 80 MG tablet Comments:   Reason for Stopping:         sildenafiL (VIAGRA) 100 MG tablet Comments:   Reason for Stopping:               I certify that this patient is confined to his home and needs intermittent skilled nursing care, physical therapy and occupational therapy.    Rosalva  MD Nigel  4/19/2024 10:19 AM  Department of Hospital medicine

## 2024-04-19 NOTE — PROGRESS NOTES
[facility] Surg  Adult Nutrition  Consult Note    SUMMARY     Recommendations    1. Continue renal diet  2. Monitor PO intake of meals and snacks  3. Continue to monitor weights and labs.   4. Collaboration with medical providers    Goals: 1. Pt to meet % EEN/EPN by RD follow up  Nutrition Goal Status: continues  Communication of RD Recs:  (POC)    Assessment and Plan    Nutrition Problem  diabetes    Related to (etiology):   DM    Signs and Symptoms (as evidenced by):   Hemoglobin A1C   Date Value Ref Range Status   03/20/2024 5.8 (H) 4.0 - 5.6 % Final     Comment:     ADA Screening Guidelines:  5.7-6.4%  Consistent with prediabetes  >or=6.5%  Consistent with diabetes    High levels of fetal hemoglobin interfere with the HbA1C  assay. Heterozygous hemoglobin variants (HbS, HgC, etc)do  not significantly interfere with this assay.   However, presence of multiple variants may affect accuracy.     10/26/2023 5.9 (H) 4.0 - 5.6 % Final     Comment:     ADA Screening Guidelines:  5.7-6.4%  Consistent with prediabetes  >or=6.5%  Consistent with diabetes    High levels of fetal hemoglobin interfere with the HbA1C  assay. Heterozygous hemoglobin variants (HbS, HgC, etc)do  not significantly interfere with this assay.   However, presence of multiple variants may affect accuracy.     04/27/2023 5.9 (H) 4.0 - 5.6 % Final     Comment:     ADA Screening Guidelines:  5.7-6.4%  Consistent with prediabetes  >or=6.5%  Consistent with diabetes    High levels of fetal hemoglobin interfere with the HbA1C  assay. Heterozygous hemoglobin variants (HbS, HgC, etc)do  not significantly interfere with this assay.   However, presence of multiple variants may affect accuracy.           Interventions/Recommendations (treatment strategy):  Collaboration with medical providers    Nutrition Diagnosis Status:   continues      Reason for Assessment    Reason For Assessment: length of stay  Diagnosis:  (paroxysmal atrial fibrillation  "with RVR)  Relevant Medical History: .pmh  Interdisciplinary Rounds: did not attend  General Information Comments: RD follow up. Pt currently on renal diet with 50% intake of lunch. BMI 30.30, obese grade l. NFPE unable to be completed, pt continues on isolation precautions. RD to continue to monitor and follow.      RD follow up. Pt currently NPO previously on renal ada diet with varied intake % since admit. BMI 30.30, obese grade l. No recent significant weight changes. Pt continues on isolation precautions. RD to continues to monitor and follow up.     Pt LOS > 8 days. Pt admitted with paroxysmal atrial fibrillation with RVR. Pmhx of  DM ll. ESRD, stroke and open wound. Currently on renal dm diet with varied intake % since admit. BMI 27.99, overweight. No recent significant weight changes per chart review. NFPE not able to be performed at this time, pt is on isolation precautions  Nutrition Discharge Planning: renal ADA    Nutrition Risk Screen    Nutrition Risk Screen: no indicators present    Nutrition/Diet History    Spiritual, Cultural Beliefs, Episcopal Practices, Values that Affect Care: no  Food Allergies: NKFA    Anthropometrics    Temp: 98 °F (36.7 °C)  Height Method: Stated  Height: 5' 8" (172.7 cm)  Height (inches): 68 in  Weight Method: Bed Scale  Weight: 90.4 kg (199 lb 4.7 oz)  Weight (lb): 199.3 lb  Ideal Body Weight (IBW), Male: 154 lb  % Ideal Body Weight, Male (lb): 119.54 %  BMI (Calculated): 30.3  BMI Grade: 25 - 29.9 - overweight       Lab/Procedures/Meds    Pertinent Labs Reviewed: reviewed  BMP  Lab Results   Component Value Date     (L) 04/19/2024    K 4.2 04/19/2024    CL 96 04/19/2024    CO2 24 04/19/2024    BUN 49 (H) 04/19/2024    CREATININE 6.9 (H) 04/19/2024    CALCIUM 7.4 (L) 04/19/2024    ANIONGAP 13 04/19/2024    EGFRNORACEVR 8 (A) 04/19/2024       Pertinent Medications Reviewed: reviewed  Current Outpatient Medications   Medication Instructions    apixaban " (ELIQUIS) 5 mg, Oral, 2 times daily    aspirin (ECOTRIN) 81 mg, Oral, Daily    dextrose 5 % in water (D5W) PgBk 50 mL with ceFAZolin 1 gram SolR Ancef 2g/2g/3g after HD on HD days    ferrous gluconate (FERGON) 324 mg, Oral, 2 times daily with meals    finasteride (PROSCAR) 5 mg, Oral, Daily    iron sucrose in NS (VENOFER) 50 mg    labetaloL (NORMODYNE) 100 mg, Oral, Daily    methoxy peg-epoetin beta (MIRCERA INJ) 75 mcg    mupirocin (BACTROBAN) 2 % ointment Apply to affected area 3 times daily    oxyCODONE-acetaminophen (PERCOCET)  mg per tablet 1 tablet, Oral, Every 6 hours PRN    RENVELA 800 mg, Oral, 3 times daily with meals    tamsulosin (FLOMAX) 0.8 mg, Oral, Daily    vancomycin HCl (VANCOMYCIN 500 MG/100 ML D5W, READY TO MIX,) Vancomycin 500mg IV after HD on HD days (M/W/F)    [START ON 4/20/2024] vitamin renal formula, B-complex-vitamin c-folic acid, (NEPHROCAP) 1 mg Cap 1 capsule, Oral, Daily          Estimated/Assessed Needs    Weight Used For Calorie Calculations: 83.5 kg (184 lb 1.4 oz)  Energy Calorie Requirements (kcal): 1888 kcal  Energy Need Method: [place] (x 1.2)  Protein Requirements: 84 g  Weight Used For Protein Calculations: 83.5 kg (184 lb 1.4 oz)     Estimated Fluid Requirement Method: RDA Method  RDA Method (mL): 1888         Nutrition Prescription Ordered    Current Diet Order: renal ada    Evaluation of Received Nutrient/Fluid Intake    I/O: i/o  Energy Calories Required: not meeting needs  Protein Required: not meeting needs  Fluid Required: not meeting needs  Comments: lbm 3/26/24  % Intake of Estimated Energy Needs: 25 - 50 %  % Meal Intake: 25 - 50 %    Nutrition Risk    Level of Risk/Frequency of Follow-up: low       Monitor and Evaluation    Food and Nutrient Intake: food and beverage intake  Food and Nutrient Adminstration: diet order  Knowledge/Beliefs/Attitudes: food and nutrition knowledge/skill, beliefs and attitudes  Physical Activity and Function:  nutrition-related ADLs and IADLs, factors affecting access to physical activity  Anthropometric Measurements: weight, weight change, body mass index  Biochemical Data, Medical Tests and Procedures: electrolyte and renal panel  Nutrition-Focused Physical Findings: overall appearance       Nutrition Follow-Up    RD Follow-up?: Yes    Katie Chacon, Registration Eligible, Provisional LDN

## 2024-04-19 NOTE — PLAN OF CARE
ADT 30 order placed for Stretcher Transportation.  Requested  time:7:00 PM stretcher to home    [address] [address]     If transportation does not arrive at ETA time nurse will be instructed to follow protocol for transportation below:  How can I get in touch directly with dispatch, if needed?                 Non-emergent (stretcher): [phone]  option 6     ++NURSING:  If Stretcher does not arrive at requested time please call the above Non Emergent Dispatcher.  If issue not resolved please escalate to your charge nurse for further instructions.

## 2024-04-19 NOTE — NURSING
[facility] [place]  Nurses Note -- 4 Eyes      4/19/2024       Skin assessed on: Q Shift      [] No Pressure Injuries Present    [x]Prevention Measures Documented    [x] Yes LDA  for Pressure Injury Previously documented     [] Yes New Pressure Injury Discovered   [] LDA for New Pressure Injury Added      Attending RN:  Marielle Bay RN     Second RN:  DARRION Farley

## 2024-04-19 NOTE — PROGRESS NOTES
Awake alert oriented NAD    Denies cns ent cp gi gu rheum sx    Wound dressing intact   Past Medical History:   Diagnosis Date    Allergy     Clotting disorder     Elaquis and APlavix    Deep vein thrombosis     Diabetes mellitus, type 2     Hypertension     Nuclear sclerosis of both eyes 6/9/2017    Renal disorder     Seizures     Stroke     Urinary tract infection      Past Surgical History:   Procedure Laterality Date    CATARACT EXTRACTION W/  INTRAOCULAR LENS IMPLANT Right 10/05/2017    Dr. Tay    CATARACT EXTRACTION W/  INTRAOCULAR LENS IMPLANT Left 10/19/2017    Dr. Tay    CYSTOSCOPY W/ RETROGRADES Bilateral 2/18/2021    Procedure: CYSTOSCOPY, WITH RETROGRADE PYELOGRAM;  Surgeon: JEMMA Styles MD;  Location: [facility] OR;  Service: Urology;  Laterality: Bilateral;  REQUESTING EARLY AS POSSIBE-LO / 2/8/2021 @ 1:13PM  RN Pre Op 2-11-21, Covid NEGATIVE ON  2-17-21.  C A    ESOPHAGOGASTRODUODENOSCOPY N/A 8/17/2020    Procedure: EGD (ESOPHAGOGASTRODUODENOSCOPY);  Surgeon: Desmond Chapman MD;  Location: [facility];  Service: Endoscopy;  Laterality: N/A;    EYE SURGERY Bilateral     cataract    FEMORAL ARTERY STENT      FRACTURE SURGERY      INCISION AND DRAINAGE, LOWER EXTREMITY Right 4/4/2024    Procedure: INCISION AND DRAINAGE, LOWER EXTREMITY;  Surgeon: Jimbo Montilla III, MD;  Location: [facility] OR;  Service: Orthopedics;  Laterality: Right;    INCISION AND DRAINAGE, LOWER EXTREMITY Right 4/6/2024    Procedure: INCISION AND DRAINAGE, LOWER EXTREMITY;  Surgeon: Desmond Barillas MD;  Location: [facility] OR;  Service: Orthopedics;  Laterality: Right;  foot and ankle    INCISION AND DRAINAGE, LOWER EXTREMITY Right 4/9/2024    Procedure: INCISION AND DRAINAGE, LOWER EXTREMITY- FOOT & ANKLE;  Surgeon: Jimbo Montilla III, MD;  Location: [facility] OR;  Service: Orthopedics;  Laterality: Right;  CULTURES, VASCHE    KNEE SURGERY      bilateral scope    WOUND DRESSING Right 4/9/2024    Procedure: WOUND VAC  EXCHANGE;  Surgeon: Jimbo Montilla III, MD;  Location: [facility];  Service: Orthopedics;  Laterality: Right;     Review of patient's allergies indicates:   Allergen Reactions    Ace inhibitors      Hyperkalemia 8/2018  Other reaction(s): Unknown    Penicillins Hives     Tolerated cefepime and cefdinir previously    Tizanidine      Felt hot       Current Facility-Administered Medications   Medication Dose Route Frequency Provider Last Rate Last Admin    0.9%  NaCl infusion (for blood administration)   Intravenous Q24H PRN Hipolito Mooney MD        0.9%  NaCl infusion   Intravenous Once Jimbo Montilla III, MD        acetaminophen tablet 650 mg  650 mg Oral Q6H PRN Jimbo Montilla III, MD        ceFAZolin (ANCEF) 1 g in dextrose 5 % in water (D5W) 50 mL IVPB (MB+)  1 g Intravenous Daily Laney Underwood MD   Stopped at 04/18/24 1613    dextrose 10% bolus 125 mL 125 mL  12.5 g Intravenous PRN Jimbo Montilla III, MD        dextrose 10% bolus 250 mL 250 mL  25 g Intravenous PRN Jimbo Montilla III, MD        diphenoxylate-atropine 2.5-0.025 mg per tablet 1 tablet  1 tablet Oral Q6H PRN Jimbo Montilla III, MD   1 tablet at 04/19/24 0035    epoetin marisol-epbx injection 10,000 Units  10,000 Units Subcutaneous Every Mon, Wed, Fri Karen Tamez MD   10,000 Units at 04/19/24 0834    ferrous gluconate tablet 324 mg  324 mg Oral BID  Karen Tamez MD   324 mg at 04/19/24 0834    finasteride tablet 5 mg  5 mg Oral Daily Jimbo Montilla III, MD   5 mg at 04/19/24 0834    glucagon (human recombinant) injection 1 mg  1 mg Intramuscular PRN Jimbo Montilla III, MD        glucose chewable tablet 16 g  16 g Oral PRN Jimbo Montilla III, MD        glucose chewable tablet 24 g  24 g Oral PRN Jimbo Montilla III, MD        heparin 25,000 units in dextrose 5% (100 units/ml) IV bolus from bag LOW INTENSITY nomogram - [facility]  60 Units/kg (Adjusted) Intravenous PRN Martino, Nusrat-My T., DO   4,630 Units at 04/17/24 0970     heparin 25,000 units in dextrose 5% (100 units/ml) IV bolus from bag LOW INTENSITY nomogram - [facility]  30 Units/kg (Adjusted) Intravenous PRN Wilman MartinoyeseniaCarolyn YARI., DO   2,310 Units at 04/16/24 1617    heparin 25,000 units in dextrose 5% 250 mL (100 units/mL) infusion LOW INTENSITY nomogram - [facility]  0-40 Units/kg/hr (Adjusted) Intravenous Continuous MartinoPepe., DO 13.1 mL/hr at 04/18/24 1902 17 Units/kg/hr at 04/18/24 1902    HYDROmorphone injection 0.5 mg  0.5 mg Intravenous Q4H PRN Jimbo Montilla III, MD   0.5 mg at 04/15/24 2107    insulin aspart U-100 pen 0-5 Units  0-5 Units Subcutaneous QID (AC + HS) PRN Jimbo Montilla III, MD   1 Units at 04/18/24 2056    melatonin tablet 6 mg  6 mg Oral Nightly PRN Jimbo Montilla III, MD   6 mg at 04/14/24 2052    naloxone 0.4 mg/mL injection 0.02 mg  0.02 mg Intravenous PRN Jimbo Montilla III, MD        ondansetron injection 4 mg  4 mg Intravenous Q6H PRN Jimbo Montilla III, MD   4 mg at 04/19/24 0643    oxyCODONE-acetaminophen  mg per tablet 1 tablet  1 tablet Oral Q4H PRN Jimbo Montilla III, MD   1 tablet at 04/17/24 2140    prochlorperazine injection Soln 5 mg  5 mg Intravenous Q6H PRN Jimbo Montilla III, MD   5 mg at 04/16/24 0137    sodium chloride 0.9% bolus 250 mL 250 mL  250 mL Intravenous PRN Jimbo Montilla III, MD        sodium chloride 0.9% bolus 250 mL 250 mL  250 mL Intravenous PRN Jimbo Montilla III, MD        tamsulosin 24 hr capsule 0.8 mg  2 capsule Oral Daily Jimbo Montilla III, MD   0.8 mg at 04/19/24 0834    vancomycin - pharmacy to dose   Intravenous pharmacy to manage frequency Melinda Casillas MD        vitamin renal formula (B-complex-vitamin c-folic acid) 1 mg per capsule 1 capsule  1 capsule Oral Daily Jimbo Montilla III, MD   1 capsule at 04/19/24 0834       LABS    Recent Results (from the past 24 hour(s))   POCT glucose    Collection Time: 04/18/24 11:17 AM   Result Value Ref Range    POCT Glucose 156 (H) 70  - 110 mg/dL   POCT glucose    Collection Time: 04/18/24  4:17 PM   Result Value Ref Range    POCT Glucose 170 (H) 70 - 110 mg/dL   POCT glucose    Collection Time: 04/18/24  7:43 PM   Result Value Ref Range    POCT Glucose 209 (H) 70 - 110 mg/dL   Comprehensive Metabolic Panel    Collection Time: 04/19/24  5:17 AM   Result Value Ref Range    Sodium 133 (L) 136 - 145 mmol/L    Potassium 4.2 3.5 - 5.1 mmol/L    Chloride 96 95 - 110 mmol/L    CO2 24 23 - 29 mmol/L    Glucose 170 (H) 70 - 110 mg/dL    BUN 49 (H) 8 - 23 mg/dL    Creatinine 6.9 (H) 0.5 - 1.4 mg/dL    Calcium 7.4 (L) 8.7 - 10.5 mg/dL    Total Protein 4.4 (L) 6.0 - 8.4 g/dL    Albumin 1.4 (L) 3.5 - 5.2 g/dL    Total Bilirubin 0.4 0.1 - 1.0 mg/dL    Alkaline Phosphatase 69 55 - 135 U/L     (H) 10 - 40 U/L    ALT 70 (H) 10 - 44 U/L    eGFR 8 (A) >60 mL/min/1.73 m^2    Anion Gap 13 8 - 16 mmol/L   Vancomycin, Random    Collection Time: 04/19/24  5:17 AM   Result Value Ref Range    Vancomycin, Random 17.4 Not established ug/mL   APTT    Collection Time: 04/19/24  5:17 AM   Result Value Ref Range    aPTT 40.2 (H) 21.0 - 32.0 sec   CBC auto differential    Collection Time: 04/19/24  5:17 AM   Result Value Ref Range    WBC 10.47 3.90 - 12.70 K/uL    RBC 2.95 (L) 4.60 - 6.20 M/uL    Hemoglobin 7.9 (L) 14.0 - 18.0 g/dL    Hematocrit 25.4 (L) 40.0 - 54.0 %    MCV 86 82 - 98 fL    MCH 26.8 (L) 27.0 - 31.0 pg    MCHC 31.1 (L) 32.0 - 36.0 g/dL    RDW 19.5 (H) 11.5 - 14.5 %    Platelets 321 150 - 450 K/uL    MPV 9.3 9.2 - 12.9 fL    Immature Granulocytes 1.4 (H) 0.0 - 0.5 %    Gran # (ANC) 8.5 (H) 1.8 - 7.7 K/uL    Immature Grans (Abs) 0.15 (H) 0.00 - 0.04 K/uL    Lymph # 0.9 (L) 1.0 - 4.8 K/uL    Mono # 0.7 0.3 - 1.0 K/uL    Eos # 0.2 0.0 - 0.5 K/uL    Baso # 0.05 0.00 - 0.20 K/uL    nRBC 0 0 /100 WBC    Gran % 81.3 (H) 38.0 - 73.0 %    Lymph % 8.5 (L) 18.0 - 48.0 %    Mono % 6.8 4.0 - 15.0 %    Eosinophil % 1.5 0.0 - 8.0 %    Basophil % 0.5 0.0 - 1.9 %     Differential Method Automated    POCT glucose    Collection Time: 04/19/24  7:07 AM   Result Value Ref Range    POCT Glucose 153 (H) 70 - 110 mg/dL   ]    I/O last 3 completed shifts:  In: 1382.8 [P.O.:600; I.V.:483.2; IV Piggyback:299.7]  Out: -     Vitals:    04/19/24 0428 04/19/24 0458 04/19/24 0705 04/19/24 0815   BP:  (!) 120/58 (!) 109/53    Pulse: 66 68 73 71   Resp:  18 18    Temp:  97.7 °F (36.5 °C) 98.1 °F (36.7 °C)    TempSrc:  Oral Oral    SpO2:  95% 95%    Weight:       Height:           No Jvd, Thyromegaly or Lymphadenopathy  Lungs: Fairly clear anteriorly and laterally  Cor: RRR no G or rubs  Abd: Soft benign good bowel sounds non tender  Ext: Pos edema R leg infection     A)    ESRD hd mwf   tn  Paf  Cellulitis anemia  2nd hyperpth   Severe hypoalb (1.4)  Calcium 8.1         P)    Renal Diet  Home meds  Protect access  HD mwf  EPO prn   Binders prn   Adjust all meds to the degree of renal fx  Close follow up I/O and weights  Maintain Hydration   Wound care and antibiotx as per 1ry

## 2024-04-20 NOTE — NURSING
AVS virtually reviewed with patient's daughter Maddy Valdes in its entirety with emphasis on medications, follow-up appointments and reasons to return to the ED or contact the [facility] On Call Nurse Care Line. Patient also encouraged to utilize their patient portal. Ease and convenience of use reiterated. Education complete and patient voiced understanding. All questions answered. Discharge teaching completed.

## 2024-04-22 ENCOUNTER — TELEPHONE (OUTPATIENT)
Dept: VASCULAR SURGERY | Facility: CLINIC | Age: 69
End: 2024-04-22
Payer: MEDICARE

## 2024-04-23 ENCOUNTER — PATIENT OUTREACH (OUTPATIENT)
Dept: ADMINISTRATIVE | Facility: CLINIC | Age: 69
End: 2024-04-23
Payer: MEDICARE

## 2024-04-23 NOTE — PROGRESS NOTES
C3 nurse attempted to contact Miguel Moore for a TCC post hospital discharge follow up call. No answer. Left voicemail with callback information. The patient has a scheduled HOSFU appointment with Lucinda Jimenez on 04/30/2024 @ 1477.

## 2024-04-24 NOTE — TELEPHONE ENCOUNTER
----- Message from Keisha Navarro sent at 6/6/2017  8:21 AM CDT -----  Contact: pt#[phone]  Pt wants to speak with you about instruction on his medication. Please call    Azelaic Acid Counseling: Patient counseled that medicine may cause skin irritation and to avoid applying near the eyes.  In the event of skin irritation, the patient was advised to reduce the amount of the drug applied or use it less frequently.   The patient verbalized understanding of the proper use and possible adverse effects of azelaic acid.  All of the patient's questions and concerns were addressed. Tazorac Pregnancy And Lactation Text: This medication is not safe during pregnancy. It is unknown if this medication is excreted in breast milk. Birth Control Pills Pregnancy And Lactation Text: This medication should be avoided if pregnant and for the first 30 days post-partum. Isotretinoin Counseling: Patient should get monthly blood tests, not donate blood, not drive at night if vision affected, not share medication, and not undergo elective surgery for 6 months after tx completed. Side effects reviewed, pt to contact office should one occur. Sarecycline Pregnancy And Lactation Text: This medication is Pregnancy Category D and not consider safe during pregnancy. It is also excreted in breast milk. Topical Retinoid Pregnancy And Lactation Text: This medication is Pregnancy Category C. It is unknown if this medication is excreted in breast milk. Winlevi Counseling:  I discussed with the patient the risks of topical clascoterone including but not limited to erythema, scaling, itching, and stinging. Patient voiced their understanding. Dapsone Pregnancy And Lactation Text: This medication is Pregnancy Category C and is not considered safe during pregnancy or breast feeding. Azithromycin Counseling:  I discussed with the patient the risks of azithromycin including but not limited to GI upset, allergic reaction, drug rash, diarrhea, and yeast infections. Tetracycline Counseling: Patient counseled regarding possible photosensitivity and increased risk for sunburn.  Patient instructed to avoid sunlight, if possible.  When exposed to sunlight, patients should wear protective clothing, sunglasses, and sunscreen.  The patient was instructed to call the office immediately if the following severe adverse effects occur:  hearing changes, easy bruising/bleeding, severe headache, or vision changes.  The patient verbalized understanding of the proper use and possible adverse effects of tetracycline.  All of the patient's questions and concerns were addressed. Patient understands to avoid pregnancy while on therapy due to potential birth defects. Topical Sulfur Applications Counseling: Topical Sulfur Counseling: Patient counseled that this medication may cause skin irritation or allergic reactions.  In the event of skin irritation, the patient was advised to reduce the amount of the drug applied or use it less frequently.   The patient verbalized understanding of the proper use and possible adverse effects of topical sulfur application.  All of the patient's questions and concerns were addressed. Benzoyl Peroxide Pregnancy And Lactation Text: This medication is Pregnancy Category C. It is unknown if benzoyl peroxide is excreted in breast milk. High Dose Vitamin A Pregnancy And Lactation Text: High dose vitamin A therapy is contraindicated during pregnancy and breast feeding. Include Pregnancy/Lactation Warning?: No Azithromycin Pregnancy And Lactation Text: This medication is considered safe during pregnancy and is also secreted in breast milk. Doxycycline Counseling:  Patient counseled regarding possible photosensitivity and increased risk for sunburn.  Patient instructed to avoid sunlight, if possible.  When exposed to sunlight, patients should wear protective clothing, sunglasses, and sunscreen.  The patient was instructed to call the office immediately if the following severe adverse effects occur:  hearing changes, easy bruising/bleeding, severe headache, or vision changes.  The patient verbalized understanding of the proper use and possible adverse effects of doxycycline.  All of the patient's questions and concerns were addressed. Aklief counseling:  Patient advised to apply a pea-sized amount only at bedtime and wait 30 minutes after washing their face before applying.  If too drying, patient may add a non-comedogenic moisturizer.  The most commonly reported side effects including irritation, redness, scaling, dryness, stinging, burning, itching, and increased risk of sunburn.  The patient verbalized understanding of the proper use and possible adverse effects of retinoids.  All of the patient's questions and concerns were addressed. Detail Level: Detailed Bactrim Pregnancy And Lactation Text: This medication is Pregnancy Category D and is known to cause fetal risk.  It is also excreted in breast milk. Erythromycin Counseling:  I discussed with the patient the risks of erythromycin including but not limited to GI upset, allergic reaction, drug rash, diarrhea, increase in liver enzymes, and yeast infections. Dapsone Counseling: I discussed with the patient the risks of dapsone including but not limited to hemolytic anemia, agranulocytosis, rashes, methemoglobinemia, kidney failure, peripheral neuropathy, headaches, GI upset, and liver toxicity.  Patients who start dapsone require monitoring including baseline LFTs and weekly CBCs for the first month, then every month thereafter.  The patient verbalized understanding of the proper use and possible adverse effects of dapsone.  All of the patient's questions and concerns were addressed. Erythromycin Pregnancy And Lactation Text: This medication is Pregnancy Category B and is considered safe during pregnancy. It is also excreted in breast milk. Sarecycline Counseling: Patient advised regarding possible photosensitivity and discoloration of the teeth, skin, lips, tongue and gums.  Patient instructed to avoid sunlight, if possible.  When exposed to sunlight, patients should wear protective clothing, sunglasses, and sunscreen.  The patient was instructed to call the office immediately if the following severe adverse effects occur:  hearing changes, easy bruising/bleeding, severe headache, or vision changes.  The patient verbalized understanding of the proper use and possible adverse effects of sarecycline.  All of the patient's questions and concerns were addressed. Aklief Pregnancy And Lactation Text: It is unknown if this medication is safe to use during pregnancy.  It is unknown if this medication is excreted in breast milk.  Breastfeeding women should use the topical cream on the smallest area of the skin for the shortest time needed while breastfeeding.  Do not apply to nipple and areola. Tazorac Counseling:  Patient advised that medication is irritating and drying.  Patient may need to apply sparingly and wash off after an hour before eventually leaving it on overnight.  The patient verbalized understanding of the proper use and possible adverse effects of tazorac.  All of the patient's questions and concerns were addressed. Birth Control Pills Counseling: Birth Control Pill Counseling: I discussed with the patient the potential side effects of OCPs including but not limited to increased risk of stroke, heart attack, thrombophlebitis, deep venous thrombosis, hepatic adenomas, breast changes, GI upset, headaches, and depression.  The patient verbalized understanding of the proper use and possible adverse effects of OCPs. All of the patient's questions and concerns were addressed. Topical Clindamycin Counseling: Patient counseled that this medication may cause skin irritation or allergic reactions.  In the event of skin irritation, the patient was advised to reduce the amount of the drug applied or use it less frequently.   The patient verbalized understanding of the proper use and possible adverse effects of clindamycin.  All of the patient's questions and concerns were addressed. Azelaic Acid Pregnancy And Lactation Text: This medication is considered safe during pregnancy and breast feeding. Isotretinoin Pregnancy And Lactation Text: This medication is Pregnancy Category X and is considered extremely dangerous during pregnancy. It is unknown if it is excreted in breast milk. Spironolactone Counseling: Patient advised regarding risks of diarrhea, abdominal pain, hyperkalemia, birth defects (for female patients), liver toxicity and renal toxicity. The patient may need blood work to monitor liver and kidney function and potassium levels while on therapy. The patient verbalized understanding of the proper use and possible adverse effects of spironolactone.  All of the patient's questions and concerns were addressed. Winlevi Pregnancy And Lactation Text: This medication is considered safe during pregnancy and breastfeeding. Bactrim Counseling:  I discussed with the patient the risks of sulfa antibiotics including but not limited to GI upset, allergic reaction, drug rash, diarrhea, dizziness, photosensitivity, and yeast infections.  Rarely, more serious reactions can occur including but not limited to aplastic anemia, agranulocytosis, methemoglobinemia, blood dyscrasias, liver or kidney failure, lung infiltrates or desquamative/blistering drug rashes. Doxycycline Pregnancy And Lactation Text: This medication is Pregnancy Category D and not consider safe during pregnancy. It is also excreted in breast milk but is considered safe for shorter treatment courses. Minocycline Counseling: Patient advised regarding possible photosensitivity and discoloration of the teeth, skin, lips, tongue and gums.  Patient instructed to avoid sunlight, if possible.  When exposed to sunlight, patients should wear protective clothing, sunglasses, and sunscreen.  The patient was instructed to call the office immediately if the following severe adverse effects occur:  hearing changes, easy bruising/bleeding, severe headache, or vision changes.  The patient verbalized understanding of the proper use and possible adverse effects of minocycline.  All of the patient's questions and concerns were addressed. High Dose Vitamin A Counseling: Side effects reviewed, pt to contact office should one occur. Spironolactone Pregnancy And Lactation Text: This medication can cause feminization of the male fetus and should be avoided during pregnancy. The active metabolite is also found in breast milk. English Topical Clindamycin Pregnancy And Lactation Text: This medication is Pregnancy Category B and is considered safe during pregnancy. It is unknown if it is excreted in breast milk. Benzoyl Peroxide Counseling: Patient counseled that medicine may cause skin irritation and bleach clothing.  In the event of skin irritation, the patient was advised to reduce the amount of the drug applied or use it less frequently.   The patient verbalized understanding of the proper use and possible adverse effects of benzoyl peroxide.  All of the patient's questions and concerns were addressed. Topical Retinoid counseling:  Patient advised to apply a pea-sized amount only at bedtime and wait 30 minutes after washing their face before applying.  If too drying, patient may add a non-comedogenic moisturizer. The patient verbalized understanding of the proper use and possible adverse effects of retinoids.  All of the patient's questions and concerns were addressed. Topical Sulfur Applications Pregnancy And Lactation Text: This medication is Pregnancy Category C and has an unknown safety profile during pregnancy. It is unknown if this topical medication is excreted in breast milk.

## 2024-04-24 NOTE — PROGRESS NOTES
C3 nurse attempted to contact Miguel Moore for a TCC post hospital discharge follow up call. No answer. Left voicemail with callback information. The patient has a scheduled HOSFU appointment with Lucinda Jimenez on 04/30/2024 @ [phone].

## 2024-04-25 PROBLEM — I73.9 PERIPHERAL ARTERY DISEASE: Status: ACTIVE | Noted: 2024-04-25

## 2024-04-25 PROBLEM — I70.0 ABDOMINAL AORTIC ATHEROSCLEROSIS: Status: ACTIVE | Noted: 2024-04-25

## 2024-04-25 NOTE — PROGRESS NOTES
C3 nurse attempted to contact Miguel Moore for a TCC post hospital discharge follow up call. The patient is unable to conduct the call @ this time. The patient requested a callback.    The patient has a scheduled HOS appointment with Lucinda Jimenez on 04/30/2024 @ 1000.

## 2024-04-26 NOTE — PROGRESS NOTES
C3 nurse spoke with Miguel Moore for a TCC post hospital discharge follow up call. The patient has a scheduled [facility] appointment with Lucinda Jimenez on 04/30/2024 @ 1000.

## 2024-05-02 RX ORDER — OXYCODONE AND ACETAMINOPHEN 10; 325 MG/1; MG/1
1 TABLET ORAL EVERY 6 HOURS PRN
Qty: 10 TABLET | Refills: 0 | Status: CANCELLED | OUTPATIENT
Start: 2024-05-02

## 2024-05-07 DIAGNOSIS — L02.419 LEG ABSCESS: Primary | ICD-10-CM

## 2024-05-09 RX ORDER — OXYCODONE AND ACETAMINOPHEN 10; 325 MG/1; MG/1
1 TABLET ORAL EVERY 6 HOURS PRN
Qty: 10 TABLET | Refills: 0 | Status: SHIPPED | OUTPATIENT
Start: 2024-05-09 | End: 2024-06-13 | Stop reason: SDUPTHER

## 2024-05-10 ENCOUNTER — HOSPITAL ENCOUNTER (INPATIENT)
Facility: HOSPITAL | Age: 69
LOS: 14 days | Discharge: HOME HEALTH | DRG: 252 | End: 2024-05-24
Attending: EMERGENCY MEDICINE | Admitting: HOSPITALIST
Payer: MEDICARE

## 2024-05-10 DIAGNOSIS — L97.509 FOOT ULCER: ICD-10-CM

## 2024-05-10 DIAGNOSIS — N18.6 ESRD (END STAGE RENAL DISEASE): Chronic | ICD-10-CM

## 2024-05-10 DIAGNOSIS — L03.115 CELLULITIS OF RIGHT FOOT: Primary | ICD-10-CM

## 2024-05-10 DIAGNOSIS — S91.302A NON HEALING LEFT HEEL WOUND: ICD-10-CM

## 2024-05-10 DIAGNOSIS — I73.9 PERIPHERAL ARTERY DISEASE: ICD-10-CM

## 2024-05-10 DIAGNOSIS — B95.8 BACTEREMIA DUE TO STAPHYLOCOCCUS: ICD-10-CM

## 2024-05-10 DIAGNOSIS — R78.81 BACTEREMIA DUE TO STAPHYLOCOCCUS: ICD-10-CM

## 2024-05-10 DIAGNOSIS — S91.301A OPEN WOUND OF RIGHT FOOT: ICD-10-CM

## 2024-05-10 DIAGNOSIS — R07.9 CHEST PAIN: ICD-10-CM

## 2024-05-10 DIAGNOSIS — I73.9 PVD (PERIPHERAL VASCULAR DISEASE): ICD-10-CM

## 2024-05-10 PROBLEM — N18.9 ANEMIA IN CHRONIC KIDNEY DISEASE: Chronic | Status: ACTIVE | Noted: 2020-08-26

## 2024-05-10 PROBLEM — Z79.4 TYPE 2 DIABETES MELLITUS WITH DIABETIC NEUROPATHY, WITH LONG-TERM CURRENT USE OF INSULIN: Chronic | Status: ACTIVE | Noted: 2018-05-10

## 2024-05-10 PROBLEM — Z99.2 CONTROLLED TYPE 2 DIABETES MELLITUS WITH CHRONIC KIDNEY DISEASE ON CHRONIC DIALYSIS, WITH LONG-TERM CURRENT USE OF INSULIN: Chronic | Status: RESOLVED | Noted: 2017-03-21 | Resolved: 2024-05-10

## 2024-05-10 PROBLEM — D63.1 ANEMIA IN CHRONIC KIDNEY DISEASE: Chronic | Status: ACTIVE | Noted: 2020-08-26

## 2024-05-10 PROBLEM — E11.22 CONTROLLED TYPE 2 DIABETES MELLITUS WITH CHRONIC KIDNEY DISEASE ON CHRONIC DIALYSIS, WITH LONG-TERM CURRENT USE OF INSULIN: Chronic | Status: RESOLVED | Noted: 2017-03-21 | Resolved: 2024-05-10

## 2024-05-10 PROBLEM — Z79.4 CONTROLLED TYPE 2 DIABETES MELLITUS WITH CHRONIC KIDNEY DISEASE ON CHRONIC DIALYSIS, WITH LONG-TERM CURRENT USE OF INSULIN: Chronic | Status: RESOLVED | Noted: 2017-03-21 | Resolved: 2024-05-10

## 2024-05-10 PROBLEM — I48.0 PAROXYSMAL ATRIAL FIBRILLATION: Chronic | Status: ACTIVE | Noted: 2024-03-20

## 2024-05-10 PROBLEM — Z99.2 CONTROLLED TYPE 2 DIABETES MELLITUS WITH CHRONIC KIDNEY DISEASE ON CHRONIC DIALYSIS, WITH LONG-TERM CURRENT USE OF INSULIN: Chronic | Status: ACTIVE | Noted: 2017-03-21

## 2024-05-10 PROBLEM — N04.9 NEPHROTIC SYNDROME: Status: RESOLVED | Noted: 2020-08-16 | Resolved: 2024-05-10

## 2024-05-10 PROBLEM — E11.40 TYPE 2 DIABETES MELLITUS WITH DIABETIC NEUROPATHY, WITH LONG-TERM CURRENT USE OF INSULIN: Chronic | Status: ACTIVE | Noted: 2018-05-10

## 2024-05-10 LAB
ALBUMIN SERPL BCP-MCNC: 2 G/DL (ref 3.5–5.2)
ALP SERPL-CCNC: 76 U/L (ref 55–135)
ALT SERPL W/O P-5'-P-CCNC: <5 U/L (ref 10–44)
ANION GAP SERPL CALC-SCNC: 10 MMOL/L (ref 8–16)
AST SERPL-CCNC: 13 U/L (ref 10–40)
BASOPHILS # BLD AUTO: 0.02 K/UL (ref 0–0.2)
BASOPHILS NFR BLD: 0.4 % (ref 0–1.9)
BILIRUB SERPL-MCNC: 0.4 MG/DL (ref 0.1–1)
BUN SERPL-MCNC: 11 MG/DL (ref 8–23)
CALCIUM SERPL-MCNC: 8.8 MG/DL (ref 8.7–10.5)
CHLORIDE SERPL-SCNC: 100 MMOL/L (ref 95–110)
CK SERPL-CCNC: 61 U/L (ref 20–200)
CO2 SERPL-SCNC: 29 MMOL/L (ref 23–29)
CREAT SERPL-MCNC: 3.7 MG/DL (ref 0.5–1.4)
CRP SERPL-MCNC: 25.4 MG/L (ref 0–8.2)
DIFFERENTIAL METHOD BLD: ABNORMAL
EOSINOPHIL # BLD AUTO: 0.1 K/UL (ref 0–0.5)
EOSINOPHIL NFR BLD: 3 % (ref 0–8)
ERYTHROCYTE [DISTWIDTH] IN BLOOD BY AUTOMATED COUNT: 16.5 % (ref 11.5–14.5)
ERYTHROCYTE [SEDIMENTATION RATE] IN BLOOD BY PHOTOMETRIC METHOD: 88 MM/HR (ref 0–23)
EST. GFR  (NO RACE VARIABLE): 17 ML/MIN/1.73 M^2
GLUCOSE SERPL-MCNC: 97 MG/DL (ref 70–110)
HCT VFR BLD AUTO: 30.2 % (ref 40–54)
HGB BLD-MCNC: 9.7 G/DL (ref 14–18)
IMM GRANULOCYTES # BLD AUTO: 0.01 K/UL (ref 0–0.04)
IMM GRANULOCYTES NFR BLD AUTO: 0.2 % (ref 0–0.5)
INR PPP: 1.1 (ref 0.8–1.2)
LACTATE SERPL-SCNC: 1.1 MMOL/L (ref 0.5–2.2)
LYMPHOCYTES # BLD AUTO: 0.8 K/UL (ref 1–4.8)
LYMPHOCYTES NFR BLD: 18.1 % (ref 18–48)
MCH RBC QN AUTO: 25.8 PG (ref 27–31)
MCHC RBC AUTO-ENTMCNC: 32.1 G/DL (ref 32–36)
MCV RBC AUTO: 80 FL (ref 82–98)
MONOCYTES # BLD AUTO: 0.4 K/UL (ref 0.3–1)
MONOCYTES NFR BLD: 8.4 % (ref 4–15)
NEUTROPHILS # BLD AUTO: 3.2 K/UL (ref 1.8–7.7)
NEUTROPHILS NFR BLD: 69.9 % (ref 38–73)
NRBC BLD-RTO: 0 /100 WBC
PLATELET # BLD AUTO: 186 K/UL (ref 150–450)
PMV BLD AUTO: 8.9 FL (ref 9.2–12.9)
POCT GLUCOSE: 109 MG/DL (ref 70–110)
POCT GLUCOSE: 128 MG/DL (ref 70–110)
POTASSIUM SERPL-SCNC: 3.6 MMOL/L (ref 3.5–5.1)
PROCALCITONIN SERPL IA-MCNC: 0.33 NG/ML
PROT SERPL-MCNC: 6.4 G/DL (ref 6–8.4)
PROTHROMBIN TIME: 12.1 SEC (ref 9–12.5)
RBC # BLD AUTO: 3.76 M/UL (ref 4.6–6.2)
SODIUM SERPL-SCNC: 139 MMOL/L (ref 136–145)
VANCOMYCIN SERPL-MCNC: 20.4 UG/ML
WBC # BLD AUTO: 4.63 K/UL (ref 3.9–12.7)

## 2024-05-10 PROCEDURE — 80053 COMPREHEN METABOLIC PANEL: CPT | Mod: HCNC | Performed by: EMERGENCY MEDICINE

## 2024-05-10 PROCEDURE — 80202 ASSAY OF VANCOMYCIN: CPT | Mod: HCNC | Performed by: EMERGENCY MEDICINE

## 2024-05-10 PROCEDURE — 99285 EMERGENCY DEPT VISIT HI MDM: CPT | Mod: 25,HCNC

## 2024-05-10 PROCEDURE — 86140 C-REACTIVE PROTEIN: CPT | Mod: HCNC | Performed by: EMERGENCY MEDICINE

## 2024-05-10 PROCEDURE — 87040 BLOOD CULTURE FOR BACTERIA: CPT | Mod: 59,HCNC | Performed by: EMERGENCY MEDICINE

## 2024-05-10 PROCEDURE — 25000003 PHARM REV CODE 250: Mod: HCNC | Performed by: HOSPITALIST

## 2024-05-10 PROCEDURE — 25000003 PHARM REV CODE 250: Mod: HCNC | Performed by: EMERGENCY MEDICINE

## 2024-05-10 PROCEDURE — 84145 PROCALCITONIN (PCT): CPT | Mod: HCNC | Performed by: EMERGENCY MEDICINE

## 2024-05-10 PROCEDURE — 82550 ASSAY OF CK (CPK): CPT | Mod: HCNC | Performed by: EMERGENCY MEDICINE

## 2024-05-10 PROCEDURE — 85610 PROTHROMBIN TIME: CPT | Mod: HCNC | Performed by: EMERGENCY MEDICINE

## 2024-05-10 PROCEDURE — 85652 RBC SED RATE AUTOMATED: CPT | Mod: HCNC | Performed by: EMERGENCY MEDICINE

## 2024-05-10 PROCEDURE — 87070 CULTURE OTHR SPECIMN AEROBIC: CPT | Mod: HCNC | Performed by: EMERGENCY MEDICINE

## 2024-05-10 PROCEDURE — 85025 COMPLETE CBC W/AUTO DIFF WBC: CPT | Mod: HCNC | Performed by: EMERGENCY MEDICINE

## 2024-05-10 PROCEDURE — 83605 ASSAY OF LACTIC ACID: CPT | Mod: HCNC | Performed by: EMERGENCY MEDICINE

## 2024-05-10 PROCEDURE — 11000001 HC ACUTE MED/SURG PRIVATE ROOM: Mod: HCNC

## 2024-05-10 PROCEDURE — 63600175 PHARM REV CODE 636 W HCPCS: Mod: HCNC | Performed by: EMERGENCY MEDICINE

## 2024-05-10 RX ORDER — POLYETHYLENE GLYCOL 3350 17 G/17G
17 POWDER, FOR SOLUTION ORAL 2 TIMES DAILY
Status: DISCONTINUED | OUTPATIENT
Start: 2024-05-10 | End: 2024-05-24 | Stop reason: HOSPADM

## 2024-05-10 RX ORDER — SODIUM CHLORIDE 0.9 % (FLUSH) 0.9 %
10 SYRINGE (ML) INJECTION EVERY 8 HOURS PRN
Status: DISCONTINUED | OUTPATIENT
Start: 2024-05-10 | End: 2024-05-24 | Stop reason: HOSPADM

## 2024-05-10 RX ORDER — ACETAMINOPHEN 325 MG/1
650 TABLET ORAL EVERY 6 HOURS PRN
Status: DISCONTINUED | OUTPATIENT
Start: 2024-05-10 | End: 2024-05-24 | Stop reason: HOSPADM

## 2024-05-10 RX ORDER — SIMETHICONE 80 MG
1 TABLET,CHEWABLE ORAL 4 TIMES DAILY PRN
Status: DISCONTINUED | OUTPATIENT
Start: 2024-05-10 | End: 2024-05-24 | Stop reason: HOSPADM

## 2024-05-10 RX ORDER — POLYETHYLENE GLYCOL 3350 17 G/17G
17 POWDER, FOR SOLUTION ORAL DAILY
Status: DISCONTINUED | OUTPATIENT
Start: 2024-05-11 | End: 2024-05-10

## 2024-05-10 RX ORDER — GLUCAGON 1 MG
1 KIT INJECTION
Status: DISCONTINUED | OUTPATIENT
Start: 2024-05-10 | End: 2024-05-24 | Stop reason: HOSPADM

## 2024-05-10 RX ORDER — IBUPROFEN 200 MG
24 TABLET ORAL
Status: DISCONTINUED | OUTPATIENT
Start: 2024-05-10 | End: 2024-05-24 | Stop reason: HOSPADM

## 2024-05-10 RX ORDER — ALUMINUM HYDROXIDE, MAGNESIUM HYDROXIDE, AND SIMETHICONE 1200; 120; 1200 MG/30ML; MG/30ML; MG/30ML
30 SUSPENSION ORAL 4 TIMES DAILY PRN
Status: DISCONTINUED | OUTPATIENT
Start: 2024-05-10 | End: 2024-05-24 | Stop reason: HOSPADM

## 2024-05-10 RX ORDER — NALOXONE HCL 0.4 MG/ML
0.02 VIAL (ML) INJECTION
Status: DISCONTINUED | OUTPATIENT
Start: 2024-05-10 | End: 2024-05-24 | Stop reason: HOSPADM

## 2024-05-10 RX ORDER — FINASTERIDE 5 MG/1
5 TABLET, FILM COATED ORAL DAILY
Status: DISCONTINUED | OUTPATIENT
Start: 2024-05-11 | End: 2024-05-24 | Stop reason: HOSPADM

## 2024-05-10 RX ORDER — IPRATROPIUM BROMIDE AND ALBUTEROL SULFATE 2.5; .5 MG/3ML; MG/3ML
3 SOLUTION RESPIRATORY (INHALATION) EVERY 4 HOURS PRN
Status: DISCONTINUED | OUTPATIENT
Start: 2024-05-10 | End: 2024-05-20

## 2024-05-10 RX ORDER — INSULIN ASPART 100 [IU]/ML
0-10 INJECTION, SOLUTION INTRAVENOUS; SUBCUTANEOUS
Status: DISCONTINUED | OUTPATIENT
Start: 2024-05-10 | End: 2024-05-24 | Stop reason: HOSPADM

## 2024-05-10 RX ORDER — ONDANSETRON HYDROCHLORIDE 2 MG/ML
4 INJECTION, SOLUTION INTRAVENOUS EVERY 8 HOURS PRN
Status: DISCONTINUED | OUTPATIENT
Start: 2024-05-10 | End: 2024-05-24 | Stop reason: HOSPADM

## 2024-05-10 RX ORDER — TAMSULOSIN HYDROCHLORIDE 0.4 MG/1
2 CAPSULE ORAL DAILY
Status: DISCONTINUED | OUTPATIENT
Start: 2024-05-11 | End: 2024-05-24 | Stop reason: HOSPADM

## 2024-05-10 RX ORDER — SEVELAMER CARBONATE 800 MG/1
800 TABLET, FILM COATED ORAL
Status: DISCONTINUED | OUTPATIENT
Start: 2024-05-11 | End: 2024-05-24 | Stop reason: HOSPADM

## 2024-05-10 RX ORDER — IBUPROFEN 200 MG
16 TABLET ORAL
Status: DISCONTINUED | OUTPATIENT
Start: 2024-05-10 | End: 2024-05-24 | Stop reason: HOSPADM

## 2024-05-10 RX ORDER — OXYCODONE AND ACETAMINOPHEN 5; 325 MG/1; MG/1
1 TABLET ORAL EVERY 8 HOURS PRN
Status: DISCONTINUED | OUTPATIENT
Start: 2024-05-10 | End: 2024-05-24 | Stop reason: HOSPADM

## 2024-05-10 RX ORDER — ASPIRIN 81 MG/1
81 TABLET ORAL DAILY
Status: DISCONTINUED | OUTPATIENT
Start: 2024-05-11 | End: 2024-05-24 | Stop reason: HOSPADM

## 2024-05-10 RX ORDER — PROCHLORPERAZINE EDISYLATE 5 MG/ML
5 INJECTION INTRAMUSCULAR; INTRAVENOUS EVERY 6 HOURS PRN
Status: DISCONTINUED | OUTPATIENT
Start: 2024-05-10 | End: 2024-05-24 | Stop reason: HOSPADM

## 2024-05-10 RX ORDER — TALC
6 POWDER (GRAM) TOPICAL NIGHTLY PRN
Status: DISCONTINUED | OUTPATIENT
Start: 2024-05-10 | End: 2024-05-24 | Stop reason: HOSPADM

## 2024-05-10 RX ORDER — LABETALOL 100 MG/1
100 TABLET, FILM COATED ORAL DAILY
Status: DISCONTINUED | OUTPATIENT
Start: 2024-05-11 | End: 2024-05-24 | Stop reason: HOSPADM

## 2024-05-10 RX ADMIN — APIXABAN 5 MG: 5 TABLET, FILM COATED ORAL at 09:05

## 2024-05-10 RX ADMIN — POLYETHYLENE GLYCOL 3350 17 G: 17 POWDER, FOR SOLUTION ORAL at 09:05

## 2024-05-10 RX ADMIN — OXYCODONE HYDROCHLORIDE AND ACETAMINOPHEN 1 TABLET: 5; 325 TABLET ORAL at 09:05

## 2024-05-10 RX ADMIN — MEROPENEM 500 MG: 500 INJECTION INTRAVENOUS at 05:05

## 2024-05-10 NOTE — Clinical Note
Bilateral: Groin.   Scrubbed with Chlorhexidine/Alcohol.    Hair: Clipped.  Skin prep dry before draping.  Prepped by: Keyon Perry, RT 5/16/2024 11:14 AM.

## 2024-05-10 NOTE — ASSESSMENT & PLAN NOTE
"Patient's FSGs are controlled on current medication regimen.  Last A1c reviewed-   Lab Results   Component Value Date    HGBA1C 5.8 (H) 03/20/2024     Most recent fingerstick glucose reviewed- No results for input(s): "POCTGLUCOSE" in the last 24 hours.  Current correctional scale  Medium  Maintain anti-hyperglycemic dose as follows-   Antihyperglycemics (From admission, onward)      Start     Stop Route Frequency Ordered    05/10/24 1858  insulin aspart U-100 pen 0-10 Units         -- SubQ Before meals & nightly PRN 05/10/24 1758          Hold Oral hypoglycemics while patient is in the hospital.  "

## 2024-05-10 NOTE — PROGRESS NOTES
Pharmacokinetic Assessment Follow Up: IV Vancomycin  Vancomycin Therapy Initial Therapy  Vancomycin serum concentration assessment(s):  Patient receiving Vancomycin Infusions on out patient basis in dialysis    The random level was drawn correctly and can be used to guide therapy at this time. The measurement is above the desired definitive target range of 10 to 20 mcg/mL.    Vancomycin Regimen Plan:  No vanco to be given on 5/11  Re-dose when the random level is less than 20 mcg/mL, next level to be drawn at 04:00 on 5/11    Drug levels (last 3 results):  Recent Labs   Lab Result Units 05/10/24  1546   Vancomycin, Random ug/mL 20.4       Pharmacy will continue to follow and monitor vancomycin.    Please contact pharmacy at extension [phone] for questions regarding this assessment.    Thank you for the consult,   Siomara Curtis       Patient brief summary:  Miguel Moore is a 68 y.o. male initiated on antimicrobial therapy with IV Vancomycin for treatment of skin & soft tissue infection    Drug Allergies:   Review of patient's allergies indicates:   Allergen Reactions    Ace inhibitors      Hyperkalemia 8/2018  Other reaction(s): Unknown    Penicillins Hives     Tolerated cefepime and cefdinir previously    Tizanidine      Felt hot       Actual Body Weight:   90.3 kg    Renal Function:   Estimated Creatinine Clearance: 20.6 mL/min (A) (based on SCr of 3.7 mg/dL (H)).,     Dialysis Method (if applicable):  intermittent HD    CBC (last 72 hours):  Recent Labs   Lab Result Units 05/10/24  1546   WBC K/uL 4.63   Hemoglobin g/dL 9.7*   Hematocrit % 30.2*   Platelets K/uL 186   Gran % % 69.9   Lymph % % 18.1   Mono % % 8.4   Eosinophil % % 3.0   Basophil % % 0.4   Differential Method  Automated       Metabolic Panel (last 72 hours):  Recent Labs   Lab Result Units 05/10/24  1546   Sodium mmol/L 139   Potassium mmol/L 3.6   Chloride mmol/L 100   CO2 mmol/L 29   Glucose mg/dL 97   BUN mg/dL 11   Creatinine mg/dL 3.7*    Albumin g/dL 2.0*   Total Bilirubin mg/dL 0.4   Alkaline Phosphatase U/L 76   AST U/L 13   ALT U/L <5*       Vancomycin Administrations:  vancomycin given in the last 96 hours        No antibiotic orders with administrations found.                    Microbiologic Results:  Microbiology Results (last 7 days)       Procedure Component Value Units Date/Time    Aerobic culture (Specify Source) **CANNOT BE ORDERED AS STAT** [[id number]] Collected: 05/10/24 1703    Order Status: Sent Specimen: Abscess from Foot, Right Updated: 05/10/24 1728    Blood culture #1 [[id number]] Collected: 05/10/24 1545    Order Status: Sent Specimen: Blood from Peripheral, Antecubital, Right Updated: 05/10/24 1555    Blood culture #2 [[id number]] Collected: 05/10/24 1547    Order Status: Sent Specimen: Blood from Peripheral, Hand, Right Updated: 05/10/24 1551

## 2024-05-10 NOTE — PROGRESS NOTES
Pharmacokinetic Initial Assessment: IV Vancomycin    Assessment/Plan:    Vancomycin Random level 20.4 on 5/10/24  No load dose to be given  Patient receiving Vancomycin on MWF w/Dialysis on  Outpatient basis  Desired empiric serum trough concentration is 10 to 20 mcg/mL  Draw vancomycin random level on 5/11 at 04:00.  Pharmacy will continue to follow and monitor vancomycin.      Please contact pharmacy at extension [phone] with any questions regarding this assessment.     Thank you for the consult,   Siomara Wangcameron       Patient brief summary:  Miguel Moore is a 68 y.o. male initiated on antimicrobial therapy with IV Vancomycin for treatment of suspected skin & soft tissue infection    Drug Allergies:   Review of patient's allergies indicates:   Allergen Reactions    Ace inhibitors      Hyperkalemia 8/2018  Other reaction(s): Unknown    Penicillins Hives     Tolerated cefepime and cefdinir previously    Tizanidine      Felt hot       Actual Body Weight:   90.3 kg    Renal Function:   Estimated Creatinine Clearance: 20.6 mL/min (A) (based on SCr of 3.7 mg/dL (H)).,     Dialysis Method (if applicable):  intermittent HD    CBC (last 72 hours):  Recent Labs   Lab Result Units 05/10/24  1546   WBC K/uL 4.63   Hemoglobin g/dL 9.7*   Hematocrit % 30.2*   Platelets K/uL 186   Gran % % 69.9   Lymph % % 18.1   Mono % % 8.4   Eosinophil % % 3.0   Basophil % % 0.4   Differential Method  Automated       Metabolic Panel (last 72 hours):  Recent Labs   Lab Result Units 05/10/24  1546   Sodium mmol/L 139   Potassium mmol/L 3.6   Chloride mmol/L 100   CO2 mmol/L 29   Glucose mg/dL 97   BUN mg/dL 11   Creatinine mg/dL 3.7*   Albumin g/dL 2.0*   Total Bilirubin mg/dL 0.4   Alkaline Phosphatase U/L 76   AST U/L 13   ALT U/L <5*       Drug levels (last 3 results):  Recent Labs   Lab Result Units 05/10/24  1546   Vancomycin, Random ug/mL 20.4       Microbiologic Results:  Microbiology Results (last 7 days)       Procedure Component Value  Units Date/Time    Aerobic culture (Specify Source) **CANNOT BE ORDERED AS STAT** [[id number]] Collected: 05/10/24 1703    Order Status: Sent Specimen: Abscess from Foot, Right Updated: 05/10/24 1728    Blood culture #1 [[id number]] Collected: 05/10/24 1545    Order Status: Sent Specimen: Blood from Peripheral, Antecubital, Right Updated: 05/10/24 1555    Blood culture #2 [[id number]] Collected: 05/10/24 1547    Order Status: Sent Specimen: Blood from Peripheral, Hand, Right Updated: 05/10/24 1552

## 2024-05-10 NOTE — ADMISSIONCARE
AdmissionCare    Guideline: Wound and Skin Management, Inpt/Amb    Based on the indications selected for the patient, the bed status of Inpatient was determined to be MET    The following indications were selected as present at the time of evaluation of the patient:      - Incision or drainage of skin or subcutaneous tissue (eg, perirectal abscess) needed   - Wound debridement or complex care needed   - Soft tissue debridement or exploration needed (eg, bite)    Operative Status Criteria selected: Inpatient: for other surgeries and procedures, inpatient stay will usually be needed because of:            - Inpatient procedure: Benchmark Length of Stay of 1 day or more; see Search for specific procedure BLOS.   - Inpatient care procedures for skin diseases as noted in Clinical Indications for Procedure   - Wound and Skin Procedures and Care AND 1 or more General Admission Criteria or Pediatric General Admission Criteria   - Procedure is usually performed on an ambulatory basis, but inpatient stay is needed. See Ambulatory Surgery Exception Criteria.    AdmissionCare documentation entered by: Andres Peacock    [facility], 27th edition, Copyright © 2023 [facility], [facility] All Rights Reserved.  [id number]:[id number]

## 2024-05-10 NOTE — ASSESSMENT & PLAN NOTE
Creatine stable for now. BMP reviewed- noted Estimated Creatinine Clearance: 20.6 mL/min (A) (based on SCr of 3.7 mg/dL (H)). according to latest data. Based on current GFR, CKD stage is end stage.  Monitor UOP and serial BMP and adjust therapy as needed. Renally dose meds. Avoid nephrotoxic medications and procedures.

## 2024-05-10 NOTE — ASSESSMENT & PLAN NOTE
Patient's anemia is currently controlled. Has not received any PRBCs to date. Etiology likely d/t chronic disease due to ESRD  Current CBC reviewed-   Lab Results   Component Value Date    HGB 9.7 (L) 05/10/2024    HCT 30.2 (L) 05/10/2024     Monitor serial CBC and transfuse if patient becomes hemodynamically unstable, symptomatic or H/H drops below 7/21.

## 2024-05-10 NOTE — ED PROVIDER NOTES
"Encounter Date: 5/10/2024    SCRIBE #1 NOTE: I, Simone Loving, am scribing for, and in the presence of,  Homer Caraballo MD. I have scribed the following portions of the note - Other sections scribed: HPI, ROS, PE.       History     Chief Complaint   Patient presents with    Wound Infection     Presents to the ED via EMS from dialysis after his wound care doc sent him to ED for possible new infection in wound. Sent to ED to be evaluated.      Miguel Moore is a 68 y.o. male, with a past medical history of DM, stroke, HTN, who presents to the ED with wound infection. Patient BIB EMS from dialysis after his wound care doc sent him to ED for possible new infection in wound. Sent to the ED to be evaluated. Patient reports sores on his ankles, feet, and heels. Patient reports pain in his feet "sometimes" and states pain was increased last night. Patient says that he does not currently walk. No other exacerbating or alleviating factors. Patient denies fever, chills, drainage, swelling, or other associated symptoms.       The history is provided by the patient. No  was used.     Review of patient's allergies indicates:   Allergen Reactions    Ace inhibitors      Hyperkalemia 8/2018  Other reaction(s): Unknown    Penicillins Hives     Tolerated cefepime and cefdinir previously    Tizanidine      Felt hot     Past Medical History:   Diagnosis Date    Allergy     Clotting disorder     Elaquis and APlavix    Deep vein thrombosis     Diabetes mellitus, type 2     Hypertension     Nuclear sclerosis of both eyes 6/9/2017    Renal disorder     Seizures     Stroke     Urinary tract infection      Past Surgical History:   Procedure Laterality Date    CATARACT EXTRACTION W/  INTRAOCULAR LENS IMPLANT Right 10/05/2017    Dr. Tay    CATARACT EXTRACTION W/  INTRAOCULAR LENS IMPLANT Left 10/19/2017    Dr. Tay    CYSTOSCOPY W/ RETROGRADES Bilateral 2/18/2021    Procedure: CYSTOSCOPY, WITH RETROGRADE " PYELOGRAM;  Surgeon: JEMMA Styles MD;  Location: [facility] OR;  Service: Urology;  Laterality: Bilateral;  REQUESTING EARLY AS POSSIBE-LO / 2/8/2021 @ 1:13PM  RN Pre Op 2-11-21, Covid NEGATIVE ON  2-17-21.  C A    ESOPHAGOGASTRODUODENOSCOPY N/A 8/17/2020    Procedure: EGD (ESOPHAGOGASTRODUODENOSCOPY);  Surgeon: Desmond Chapman MD;  Location: [facility];  Service: Endoscopy;  Laterality: N/A;    EYE SURGERY Bilateral     cataract    FEMORAL ARTERY STENT      FRACTURE SURGERY      INCISION AND DRAINAGE, LOWER EXTREMITY Right 4/4/2024    Procedure: INCISION AND DRAINAGE, LOWER EXTREMITY;  Surgeon: Jimbo Montilla III, MD;  Location: [facility] OR;  Service: Orthopedics;  Laterality: Right;    INCISION AND DRAINAGE, LOWER EXTREMITY Right 4/6/2024    Procedure: INCISION AND DRAINAGE, LOWER EXTREMITY;  Surgeon: Desmond Barillas MD;  Location: [facility] OR;  Service: Orthopedics;  Laterality: Right;  foot and ankle    INCISION AND DRAINAGE, LOWER EXTREMITY Right 4/9/2024    Procedure: INCISION AND DRAINAGE, LOWER EXTREMITY- FOOT & ANKLE;  Surgeon: Jimbo Montilla III, MD;  Location: [facility] OR;  Service: Orthopedics;  Laterality: Right;  CULTURES, VASCHE    KNEE SURGERY      bilateral scope    WOUND DRESSING Right 4/9/2024    Procedure: WOUND VAC EXCHANGE;  Surgeon: Jimbo Montilla III, MD;  Location: [facility] OR;  Service: Orthopedics;  Laterality: Right;     Family History   Problem Relation Name Age of Onset    Diabetes Mother      Heart disease Mother      Hypertension Mother      Cataracts Mother      No Known Problems Father      Hypertension Sister      No Known Problems Brother      No Known Problems Maternal Aunt      No Known Problems Maternal Uncle      No Known Problems Paternal Aunt      No Known Problems Paternal Uncle      No Known Problems Maternal Grandmother      No Known Problems Maternal Grandfather      No Known Problems Paternal Grandmother      No Known Problems Paternal Grandfather      No Known Problems  Daughter      No Known Problems Other      Amblyopia Neg Hx      Blindness Neg Hx      Cancer Neg Hx      Glaucoma Neg Hx      Macular degeneration Neg Hx      Retinal detachment Neg Hx      Strabismus Neg Hx      Stroke Neg Hx      Thyroid disease Neg Hx       Social History     Tobacco Use    Smoking status: Never    Smokeless tobacco: Never   Substance Use Topics    Alcohol use: No    Drug use: No     Review of Systems   Constitutional:  Negative for chills, diaphoresis, fatigue and fever.   HENT:  Negative for congestion, ear discharge, ear pain, rhinorrhea, sore throat and trouble swallowing.    Eyes:  Negative for photophobia, redness and visual disturbance.   Respiratory:  Negative for cough and shortness of breath.    Cardiovascular:  Negative for chest pain, palpitations and leg swelling.   Gastrointestinal:  Negative for abdominal pain, diarrhea, nausea and vomiting.   Genitourinary:  Negative for difficulty urinating, dysuria, flank pain, frequency and hematuria.   Musculoskeletal:  Positive for myalgias. Negative for arthralgias, back pain, neck pain and neck stiffness.   Skin:  Negative for pallor and rash.   Neurological:  Negative for dizziness, weakness, light-headedness, numbness and headaches.   Hematological:  Does not bruise/bleed easily.       Physical Exam     Initial Vitals [05/10/24 1322]   BP Pulse Resp Temp SpO2   126/77 80 18 98.3 °F (36.8 °C) 100 %      MAP       --         Physical Exam    Nursing note and vitals reviewed.  Constitutional: He appears well-developed and well-nourished.   HENT:   Head: Atraumatic.   Eyes: EOM are normal. Pupils are equal, round, and reactive to light.   Neck: Neck supple. No JVD present.   Normal range of motion.  Cardiovascular:  Normal rate, regular rhythm, normal heart sounds and intact distal pulses.     Exam reveals no gallop and no friction rub.       No murmur heard.  Pulmonary/Chest: Breath sounds normal.   Abdominal: Abdomen is soft. Bowel sounds  are normal.   Musculoskeletal:      Cervical back: Normal range of motion and neck supple.      Comments:   Severe peripheral vascular disease changes to bilateral lower legs.  Patient appears to have difficulty moving the legs.  Images of the right foot placed in the chart.  Left heel is unstageable eschar.     Lymphadenopathy:     He has no cervical adenopathy.   Neurological: He is alert and oriented to person, place, and time.   Skin: Skin is warm and dry.   Psychiatric: He has a normal mood and affect. Thought content normal.               ED Course   Procedures  Labs Reviewed   CBC W/ AUTO DIFFERENTIAL - Abnormal; Notable for the following components:       Result Value    RBC 3.76 (*)     Hemoglobin 9.7 (*)     Hematocrit 30.2 (*)     MCV 80 (*)     MCH 25.8 (*)     RDW 16.5 (*)     MPV 8.9 (*)     Lymph # 0.8 (*)     All other components within normal limits   COMPREHENSIVE METABOLIC PANEL - Abnormal; Notable for the following components:    Creatinine 3.7 (*)     Albumin 2.0 (*)     ALT <5 (*)     eGFR 17 (*)     All other components within normal limits   SEDIMENTATION RATE - Abnormal; Notable for the following components:    Sed Rate 88 (*)     All other components within normal limits   C-REACTIVE PROTEIN - Abnormal; Notable for the following components:    CRP 25.4 (*)     All other components within normal limits   PROCALCITONIN - Abnormal; Notable for the following components:    Procalcitonin 0.33 (*)     All other components within normal limits   CULTURE, BLOOD   CULTURE, BLOOD   CULTURE, AEROBIC  (SPECIFY SOURCE)   LACTIC ACID, PLASMA   PROTIME-INR   CK          Imaging Results              X-Ray Foot 2 View Right (Final result)  Result time 05/10/24 16:35:51   Procedure changed from X-Ray Foot Complete Right     Final result by Trace Her MD (05/10/24 16:35:51)                   Impression:      1. Significantly limited evaluation of the foot given positioning.  There is diffuse edema  about the foot, no definite acute displaced fracture or dislocation.      Electronically signed by: Trace Her MD  Date:    05/10/2024  Time:    16:35               Narrative:    EXAMINATION:  XR FOOT 2 VIEW RIGHT    CLINICAL HISTORY:  foot ulcer;  Non-pressure chronic ulcer of other part of unspecified foot with unspecified severity    COMPARISON:  None    FINDINGS:  Two views right foot.    There is suboptimal positioning, limiting evaluation of the foot.  Allowing for this, there is osteopenia and prominent degenerative change of the foot noting Charcot appearance.  The ankle mortise appears intact.  There is diffuse edema about the foot.  No convincing acute displaced fracture or dislocation.                                       Medications - No data to display  Medical Decision Making  This is an emergent evaluation of a 68 y.o. male who presents with wound infection. The patient was seen and examined. The history and physical exam was obtained. The nursing notes and vital signs were reviewed. Secondary to symptoms and examination findings.      Amount and/or Complexity of Data Reviewed  Labs: ordered.  Radiology: ordered.            Scribe Attestation:   Scribe #1: I performed the above scribed service and the documentation accurately describes the services I performed. I attest to the accuracy of the note.         I, Homer Caraballo, personally performed the services described in this documentation. All medical record entries made by the scribe were at my direction and in my presence. I have reviewed the chart and agree that the record reflects my personal performance and is accurate and complete.                        Clinical Impression:  Final diagnoses:  [L97.509] Foot ulcer  [S91.302A] Non healing left heel wound                 Homer Caraballo MD  05/10/24 [id number]

## 2024-05-10 NOTE — SUBJECTIVE & OBJECTIVE
Past Medical History:   Diagnosis Date    Allergy     Clotting disorder     Elaquis and APlavix    Deep vein thrombosis     Diabetes mellitus, type 2     Hypertension     Nuclear sclerosis of both eyes 6/9/2017    Renal disorder     Seizures     Stroke     Urinary tract infection        Past Surgical History:   Procedure Laterality Date    CATARACT EXTRACTION W/  INTRAOCULAR LENS IMPLANT Right 10/05/2017    Dr. Tay    CATARACT EXTRACTION W/  INTRAOCULAR LENS IMPLANT Left 10/19/2017    Dr. Tay    CYSTOSCOPY W/ RETROGRADES Bilateral 2/18/2021    Procedure: CYSTOSCOPY, WITH RETROGRADE PYELOGRAM;  Surgeon: JEMMA Styles MD;  Location: [facility] OR;  Service: Urology;  Laterality: Bilateral;  REQUESTING EARLY AS POSSIBE-LO / 2/8/2021 @ 1:13PM  RN Pre Op 2-11-21, Covid NEGATIVE ON  2-17-21.  C A    ESOPHAGOGASTRODUODENOSCOPY N/A 8/17/2020    Procedure: EGD (ESOPHAGOGASTRODUODENOSCOPY);  Surgeon: Desmond Chapman MD;  Location: [facility];  Service: Endoscopy;  Laterality: N/A;    EYE SURGERY Bilateral     cataract    FEMORAL ARTERY STENT      FRACTURE SURGERY      INCISION AND DRAINAGE, LOWER EXTREMITY Right 4/4/2024    Procedure: INCISION AND DRAINAGE, LOWER EXTREMITY;  Surgeon: Jimbo Montilla III, MD;  Location: [facility] OR;  Service: Orthopedics;  Laterality: Right;    INCISION AND DRAINAGE, LOWER EXTREMITY Right 4/6/2024    Procedure: INCISION AND DRAINAGE, LOWER EXTREMITY;  Surgeon: Desmond Barillas MD;  Location: [facility] OR;  Service: Orthopedics;  Laterality: Right;  foot and ankle    INCISION AND DRAINAGE, LOWER EXTREMITY Right 4/9/2024    Procedure: INCISION AND DRAINAGE, LOWER EXTREMITY- FOOT & ANKLE;  Surgeon: Jimbo Montilla III, MD;  Location: [facility] OR;  Service: Orthopedics;  Laterality: Right;  CULTURES, VASCHE    KNEE SURGERY      bilateral scope    WOUND DRESSING Right 4/9/2024    Procedure: WOUND VAC EXCHANGE;  Surgeon: Jimbo Montilla III, MD;  Location: [facility] OR;  Service: Orthopedics;   Laterality: Right;       Review of patient's allergies indicates:   Allergen Reactions    Ace inhibitors      Hyperkalemia 8/2018  Other reaction(s): Unknown    Penicillins Hives     Tolerated cefepime and cefdinir previously    Tizanidine      Felt hot       No current facility-administered medications on file prior to encounter.     Current Outpatient Medications on File Prior to Encounter   Medication Sig    apixaban (ELIQUIS) 5 mg Tab Take 1 tablet (5 mg total) by mouth 2 (two) times daily.    aspirin (ECOTRIN) 81 MG EC tablet Take 1 tablet (81 mg total) by mouth once daily.    dextrose 5 % in water (D5W) PgBk 50 mL with ceFAZolin 1 gram SolR Ancef 2g/2g/3g after HD on HD days    ferrous gluconate (FERGON) 324 MG tablet Take 1 tablet (324 mg total) by mouth 2 (two) times daily with meals.    finasteride (PROSCAR) 5 mg tablet Take 1 tablet (5 mg total) by mouth once daily.    iron sucrose in NS (VENOFER) 100 mg/100 mL PgBk 50 mg.    labetaloL (NORMODYNE) 100 MG tablet Take 1 tablet (100 mg total) by mouth once daily.    methoxy peg-epoetin beta (MIRCERA INJ) 75 mcg.    mupirocin (BACTROBAN) 2 % ointment Apply to affected area 3 times daily    oxyCODONE-acetaminophen (PERCOCET)  mg per tablet Take 1 tablet by mouth every 6 (six) hours as needed for Pain.    RENVELA 800 mg Tab Take 1 tablet (800 mg total) by mouth 3 (three) times daily with meals.    tamsulosin (FLOMAX) 0.4 mg Cap Take 2 capsules (0.8 mg total) by mouth once daily.    vancomycin HCl (VANCOMYCIN 500 MG/100 ML D5W, READY TO MIX,) Vancomycin 500mg IV after HD on HD days (M/W/F)    vitamin renal formula, B-complex-vitamin c-folic acid, (NEPHROCAP) 1 mg Cap Take 1 capsule by mouth once daily.     Family History       Problem Relation (Age of Onset)    Cataracts Mother    Diabetes Mother    Heart disease Mother    Hypertension Mother, Sister    No Known Problems Father, Brother, Maternal Aunt, Maternal Uncle, Paternal Aunt, Paternal Uncle, Maternal  Grandmother, Maternal Grandfather, Paternal Grandmother, Paternal Grandfather, Daughter, Other          Tobacco Use    Smoking status: Never    Smokeless tobacco: Never   Substance and Sexual Activity    Alcohol use: No    Drug use: No    Sexual activity: Not on file     Review of Systems   Constitutional:  Negative for chills and fever.   HENT:  Negative for congestion and rhinorrhea.    Eyes:  Negative for photophobia and visual disturbance.   Respiratory:  Negative for cough and shortness of breath.    Cardiovascular:  Negative for chest pain, palpitations and leg swelling.   Gastrointestinal:  Negative for abdominal pain, diarrhea, nausea and vomiting.   Genitourinary:  Negative for frequency, hematuria and urgency.   Skin:  Positive for wound. Negative for pallor and rash.   Neurological:  Negative for light-headedness and headaches.   Psychiatric/Behavioral:  Negative for confusion and decreased concentration.      Objective:     Vital Signs (Most Recent):  Temp: 98.3 °F (36.8 °C) (05/10/24 1322)  Pulse: 80 (05/10/24 1322)  Resp: 18 (05/10/24 1322)  BP: 126/77 (05/10/24 1322)  SpO2: 100 % (05/10/24 1322) Vital Signs (24h Range):  Temp:  [98.3 °F (36.8 °C)] 98.3 °F (36.8 °C)  Pulse:  [80] 80  Resp:  [18] 18  SpO2:  [100 %] 100 %  BP: (126)/(77) 126/77     Weight: 90.3 kg (199 lb)  Body mass index is 30.26 kg/m².     Physical Exam  Vitals and nursing note reviewed.   Constitutional:       General: He is not in acute distress.     Appearance: He is well-developed.   HENT:      Head: Normocephalic and atraumatic.      Right Ear: External ear normal.      Left Ear: External ear normal.      Nose: Nose normal.   Eyes:      Conjunctiva/sclera: Conjunctivae normal.   Cardiovascular:      Rate and Rhythm: Normal rate and regular rhythm.   Pulmonary:      Effort: Pulmonary effort is normal. No respiratory distress.   Abdominal:      General: There is no distension.      Palpations: Abdomen is soft.   Skin:     Findings:  Wound present.   Neurological:      Mental Status: He is alert and oriented to person, place, and time.   Psychiatric:         Thought Content: Thought content normal.                Significant Labs: All pertinent labs within the past 24 hours have been reviewed.    Significant Imaging: I have reviewed all pertinent imaging results/findings within the past 24 hours.

## 2024-05-10 NOTE — HPI
68 y.o. male with hypertension, BPH, chronic right lower extremity DVT, DM2, dyslipidemia, ESRD on HD, hemiplegia and hemiparesis following cerebral infarction affecting the right dominant side, paroxysmal AFib, seizure disorder as sequela of CVA sent to the ED from wound care for new wound infection.  Denies fever, chills, cough, shortness breath, chest pain, dizziness, syncope.  Was hospitalized in March for cellulitis and abscess to the right foot with surgical intervention at that time.  In the ED today, CRP and procalcitonin elevated.  Blood and wound cultures were obtained.  IV antibiotics initiated.

## 2024-05-10 NOTE — ED TRIAGE NOTES
Pt presents to ED via EMS due to wound on right foot. Pt states he was sent here from wound care to get further evaluation. Pt reorts wound to right leg has been spreading. Pt has history of DM, and ESRF. Pt receives dialysis MWF, last completed today. Pt denies chest pain or sob. Pt denies any pain.

## 2024-05-10 NOTE — ASSESSMENT & PLAN NOTE
Chronic, uncontrolled. Latest blood pressure and vitals reviewed-     Temp:  [98.3 °F (36.8 °C)]   Pulse:  [80]   Resp:  [18]   BP: (126)/(77)   SpO2:  [100 %] .   Home meds for hypertension were reviewed and noted below.   Hypertension Medications               labetaloL (NORMODYNE) 100 MG tablet Take 1 tablet (100 mg total) by mouth once daily.            While in the hospital, will manage blood pressure as follows; Continue home antihypertensive regimen    Will utilize p.r.n. blood pressure medication only if patient's blood pressure greater than 180/110 and he develops symptoms such as worsening chest pain or shortness of breath.

## 2024-05-10 NOTE — ASSESSMENT & PLAN NOTE
Patient with Paroxysmal (<7 days) atrial fibrillation which is controlled currently with Beta Blocker. Patient is currently in sinus rhythm.[id number] Score: 2. Anticoagulation indicated. Anticoagulation done with apixaban .

## 2024-05-11 PROBLEM — R74.8 ELEVATED LIVER ENZYMES: Status: RESOLVED | Noted: 2021-01-04 | Resolved: 2024-05-11

## 2024-05-11 PROBLEM — E66.811 CLASS 1 OBESITY DUE TO EXCESS CALORIES WITH SERIOUS COMORBIDITY AND BODY MASS INDEX (BMI) OF 30.0 TO 30.9 IN ADULT: Status: RESOLVED | Noted: 2024-04-16 | Resolved: 2024-05-11

## 2024-05-11 PROBLEM — E66.09 CLASS 1 OBESITY DUE TO EXCESS CALORIES WITH SERIOUS COMORBIDITY AND BODY MASS INDEX (BMI) OF 30.0 TO 30.9 IN ADULT: Status: RESOLVED | Noted: 2024-04-16 | Resolved: 2024-05-11

## 2024-05-11 PROBLEM — L03.115 CELLULITIS OF RIGHT FOOT: Status: RESOLVED | Noted: 2024-03-20 | Resolved: 2024-05-11

## 2024-05-11 LAB
ALBUMIN SERPL BCP-MCNC: 1.9 G/DL (ref 3.5–5.2)
ALP SERPL-CCNC: 73 U/L (ref 55–135)
ALT SERPL W/O P-5'-P-CCNC: <5 U/L (ref 10–44)
ANION GAP SERPL CALC-SCNC: 10 MMOL/L (ref 8–16)
AST SERPL-CCNC: 11 U/L (ref 10–40)
BASOPHILS # BLD AUTO: 0.04 K/UL (ref 0–0.2)
BASOPHILS NFR BLD: 0.7 % (ref 0–1.9)
BILIRUB SERPL-MCNC: 0.3 MG/DL (ref 0.1–1)
BUN SERPL-MCNC: 16 MG/DL (ref 8–23)
CALCIUM SERPL-MCNC: 8.2 MG/DL (ref 8.7–10.5)
CHLORIDE SERPL-SCNC: 102 MMOL/L (ref 95–110)
CO2 SERPL-SCNC: 27 MMOL/L (ref 23–29)
CREAT SERPL-MCNC: 4.5 MG/DL (ref 0.5–1.4)
DIFFERENTIAL METHOD BLD: ABNORMAL
EOSINOPHIL # BLD AUTO: 0.1 K/UL (ref 0–0.5)
EOSINOPHIL NFR BLD: 2 % (ref 0–8)
ERYTHROCYTE [DISTWIDTH] IN BLOOD BY AUTOMATED COUNT: 17 % (ref 11.5–14.5)
EST. GFR  (NO RACE VARIABLE): 13 ML/MIN/1.73 M^2
GLUCOSE SERPL-MCNC: 107 MG/DL (ref 70–110)
HCT VFR BLD AUTO: 31.6 % (ref 40–54)
HGB BLD-MCNC: 9.7 G/DL (ref 14–18)
IMM GRANULOCYTES # BLD AUTO: 0.01 K/UL (ref 0–0.04)
IMM GRANULOCYTES NFR BLD AUTO: 0.2 % (ref 0–0.5)
LYMPHOCYTES # BLD AUTO: 1.1 K/UL (ref 1–4.8)
LYMPHOCYTES NFR BLD: 20.4 % (ref 18–48)
MCH RBC QN AUTO: 25.9 PG (ref 27–31)
MCHC RBC AUTO-ENTMCNC: 30.7 G/DL (ref 32–36)
MCV RBC AUTO: 85 FL (ref 82–98)
MONOCYTES # BLD AUTO: 0.4 K/UL (ref 0.3–1)
MONOCYTES NFR BLD: 7.8 % (ref 4–15)
NEUTROPHILS # BLD AUTO: 3.7 K/UL (ref 1.8–7.7)
NEUTROPHILS NFR BLD: 68.9 % (ref 38–73)
NRBC BLD-RTO: 0 /100 WBC
PLATELET # BLD AUTO: 179 K/UL (ref 150–450)
PMV BLD AUTO: 9.8 FL (ref 9.2–12.9)
POCT GLUCOSE: 109 MG/DL (ref 70–110)
POCT GLUCOSE: 93 MG/DL (ref 70–110)
POTASSIUM SERPL-SCNC: 3.6 MMOL/L (ref 3.5–5.1)
PROT SERPL-MCNC: 5.6 G/DL (ref 6–8.4)
RBC # BLD AUTO: 3.74 M/UL (ref 4.6–6.2)
SODIUM SERPL-SCNC: 139 MMOL/L (ref 136–145)
VANCOMYCIN SERPL-MCNC: 18.7 UG/ML
WBC # BLD AUTO: 5.4 K/UL (ref 3.9–12.7)

## 2024-05-11 PROCEDURE — 87040 BLOOD CULTURE FOR BACTERIA: CPT | Mod: 59,HCNC | Performed by: HOSPITALIST

## 2024-05-11 PROCEDURE — 36415 COLL VENOUS BLD VENIPUNCTURE: CPT | Mod: HCNC | Performed by: HOSPITALIST

## 2024-05-11 PROCEDURE — 80053 COMPREHEN METABOLIC PANEL: CPT | Mod: HCNC | Performed by: HOSPITALIST

## 2024-05-11 PROCEDURE — 25000003 PHARM REV CODE 250: Mod: HCNC | Performed by: HOSPITALIST

## 2024-05-11 PROCEDURE — 25000003 PHARM REV CODE 250: Mod: HCNC | Performed by: EMERGENCY MEDICINE

## 2024-05-11 PROCEDURE — 36415 COLL VENOUS BLD VENIPUNCTURE: CPT | Mod: HCNC | Performed by: STUDENT IN AN ORGANIZED HEALTH CARE EDUCATION/TRAINING PROGRAM

## 2024-05-11 PROCEDURE — 11000001 HC ACUTE MED/SURG PRIVATE ROOM: Mod: HCNC

## 2024-05-11 PROCEDURE — 85025 COMPLETE CBC W/AUTO DIFF WBC: CPT | Mod: HCNC | Performed by: HOSPITALIST

## 2024-05-11 PROCEDURE — 99900035 HC TECH TIME PER 15 MIN (STAT): Mod: HCNC

## 2024-05-11 PROCEDURE — 94761 N-INVAS EAR/PLS OXIMETRY MLT: CPT | Mod: HCNC

## 2024-05-11 PROCEDURE — 63600175 PHARM REV CODE 636 W HCPCS: Mod: HCNC | Performed by: EMERGENCY MEDICINE

## 2024-05-11 PROCEDURE — 80202 ASSAY OF VANCOMYCIN: CPT | Mod: HCNC | Performed by: STUDENT IN AN ORGANIZED HEALTH CARE EDUCATION/TRAINING PROGRAM

## 2024-05-11 RX ORDER — MUPIROCIN 20 MG/G
OINTMENT TOPICAL 2 TIMES DAILY
Status: DISPENSED | OUTPATIENT
Start: 2024-05-11 | End: 2024-05-16

## 2024-05-11 RX ORDER — ATORVASTATIN CALCIUM 40 MG/1
80 TABLET, FILM COATED ORAL DAILY
Status: DISCONTINUED | OUTPATIENT
Start: 2024-05-12 | End: 2024-05-24 | Stop reason: HOSPADM

## 2024-05-11 RX ORDER — BISACODYL 10 MG/1
10 SUPPOSITORY RECTAL DAILY PRN
Status: DISCONTINUED | OUTPATIENT
Start: 2024-05-11 | End: 2024-05-24 | Stop reason: HOSPADM

## 2024-05-11 RX ADMIN — POLYETHYLENE GLYCOL 3350 17 G: 17 POWDER, FOR SOLUTION ORAL at 09:05

## 2024-05-11 RX ADMIN — OXYCODONE HYDROCHLORIDE AND ACETAMINOPHEN 1 TABLET: 5; 325 TABLET ORAL at 05:05

## 2024-05-11 RX ADMIN — Medication 1 CAPSULE: at 09:05

## 2024-05-11 RX ADMIN — LABETALOL HYDROCHLORIDE 100 MG: 100 TABLET, FILM COATED ORAL at 09:05

## 2024-05-11 RX ADMIN — SEVELAMER CARBONATE 800 MG: 800 TABLET, FILM COATED ORAL at 12:05

## 2024-05-11 RX ADMIN — POLYETHYLENE GLYCOL 3350 17 G: 17 POWDER, FOR SOLUTION ORAL at 12:05

## 2024-05-11 RX ADMIN — MUPIROCIN: 20 OINTMENT TOPICAL at 09:05

## 2024-05-11 RX ADMIN — APIXABAN 5 MG: 5 TABLET, FILM COATED ORAL at 12:05

## 2024-05-11 RX ADMIN — OXYCODONE HYDROCHLORIDE AND ACETAMINOPHEN 1 TABLET: 5; 325 TABLET ORAL at 04:05

## 2024-05-11 RX ADMIN — MEROPENEM 500 MG: 500 INJECTION INTRAVENOUS at 09:05

## 2024-05-11 RX ADMIN — SEVELAMER CARBONATE 800 MG: 800 TABLET, FILM COATED ORAL at 06:05

## 2024-05-11 RX ADMIN — FINASTERIDE 5 MG: 5 TABLET, FILM COATED ORAL at 09:05

## 2024-05-11 RX ADMIN — TAMSULOSIN HYDROCHLORIDE 0.8 MG: 0.4 CAPSULE ORAL at 09:05

## 2024-05-11 RX ADMIN — ASPIRIN 81 MG: 81 TABLET, COATED ORAL at 12:05

## 2024-05-11 RX ADMIN — APIXABAN 5 MG: 5 TABLET, FILM COATED ORAL at 09:05

## 2024-05-11 NOTE — NURSING
[facility], [facility]  Nurses Note -- 4 Eyes      5/11/2024       Skin assessed on: Q Shift      [] No Pressure Injuries Present    []Prevention Measures Documented    [x] Yes LDA  for Pressure Injury Previously documented   Appear to be an old pressure injury that's pink in coloring but skin is intact.     [] Yes New Pressure Injury Discovered   [] LDA for New Pressure Injury Added      Attending RN:  Danielle Ryan LPN     Second RN:  DARRION Kessler

## 2024-05-11 NOTE — NURSING
[facility], [facility]  Nurses Note -- 4 Eyes      5/11/2024       Skin assessed on: Admit      [x] No Pressure Injuries Present    [x]Prevention Measures Documented    [] Yes LDA  for Pressure Injury Previously documented     [] Yes New Pressure Injury Discovered   [] LDA for New Pressure Injury Added      Attending RN:  Alanna Patel LPN     Second RN:  Steph Schaeffer RN

## 2024-05-11 NOTE — NURSING
Notified by lab,blood cultures collected on 5/10/2024 with 1/4 positive for gram positive cocci. New blood culture ordered per Dexter Mariscal.

## 2024-05-11 NOTE — CONSULTS
Chart reviewed. S/p  RLE I&D 4/4,4/6 and 4/9/2024 per ortho. Will defer any surgical intervention to ortho. Ortho consult placed. Podiatry will sign off.

## 2024-05-11 NOTE — NURSING
Unwrapped dressing on right and left feet to allow Dr. Reddy to assess. Open wounds noted on right ankle and black eschar noted on plantar section of the foot. The left heel is black and unstageable per physician. Bilateral wounds cleaned with  Vashe and patted dry and wrapped with Kurlex. Photos taken for charting purposes. Pt tolerated well, foam booting in place on both feet. Turning wedge in use.

## 2024-05-11 NOTE — ASSESSMENT & PLAN NOTE
Receives HD MWF via [facility]. Has not missed any sessions.   - Nephrology consulted  - appears euvolemic, no indication for acute dialysis today

## 2024-05-11 NOTE — PROGRESS NOTES
Pharmacokinetic Assessment Follow Up: IV Vancomycin    Vancomycin serum concentration assessment(s):    The random level was drawn correctly and can be used to guide therapy at this time. The measurement is within the desired definitive target range of 10 to 20 mcg/mL.    Vancomycin Regimen Plan:    Re-dose when the random level is less than 20 mcg/mL, next level to be drawn at 0400 on 5/12/24    Drug levels (last 3 results):  Recent Labs   Lab Result Units 05/10/24  1546 05/11/24  0457   Vancomycin, Random ug/mL 20.4 18.7       Pharmacy will continue to follow and monitor vancomycin.    Please contact pharmacy at extension [phone] for questions regarding this assessment.    Thank you for the consult,   Hipolito Sandy       Patient brief summary:  Miguel Moore is a 68 y.o. male initiated on antimicrobial therapy with IV Vancomycin for treatment of skin & soft tissue infection    The patient's current regimen is random pulse dosing    Drug Allergies:   Review of patient's allergies indicates:   Allergen Reactions    Ace inhibitors      Hyperkalemia 8/2018  Other reaction(s): Unknown    Penicillins Hives     Tolerated cefepime and cefdinir previously    Tizanidine      Felt hot       Actual Body Weight:   77 kg    Renal Function:   Estimated Creatinine Clearance: 18.2 mL/min (A) (based on SCr of 3.7 mg/dL (H)).,     Dialysis Method (if applicable):  intermittent HD    CBC (last 72 hours):  Recent Labs   Lab Result Units 05/10/24  1546 05/11/24  0457   WBC K/uL 4.63 5.40   Hemoglobin g/dL 9.7* 9.7*   Hematocrit % 30.2* 31.6*   Platelets K/uL 186 179   Gran % % 69.9 68.9   Lymph % % 18.1 20.4   Mono % % 8.4 7.8   Eosinophil % % 3.0 2.0   Basophil % % 0.4 0.7   Differential Method  Automated Automated       Metabolic Panel (last 72 hours):  Recent Labs   Lab Result Units 05/10/24  1546   Sodium mmol/L 139   Potassium mmol/L 3.6   Chloride mmol/L 100   CO2 mmol/L 29   Glucose mg/dL 97   BUN mg/dL 11   Creatinine  mg/dL 3.7*   Albumin g/dL 2.0*   Total Bilirubin mg/dL 0.4   Alkaline Phosphatase U/L 76   AST U/L 13   ALT U/L <5*       Vancomycin Administrations:  vancomycin given in the last 96 hours        No antibiotic orders with administrations found.                    Microbiologic Results:  Microbiology Results (last 7 days)       Procedure Component Value Units Date/Time    Blood culture #1 [[id number]] Collected: 05/10/24 1545    Order Status: Completed Specimen: Blood from Peripheral, Antecubital, Right Updated: 05/10/24 2312     Blood Culture, Routine No Growth to date    Narrative:      Blood Culture #1    Blood culture #2 [[id number]] Collected: 05/10/24 1547    Order Status: Completed Specimen: Blood from Peripheral, Hand, Right Updated: 05/10/24 2312     Blood Culture, Routine No Growth to date    Narrative:      Blood Culture #2    Aerobic culture (Specify Source) **CANNOT BE ORDERED AS STAT** [[id number]] Collected: 05/10/24 1703    Order Status: Sent Specimen: Abscess from Foot, Right Updated: 05/10/24 1728

## 2024-05-11 NOTE — H&P
[facility] Medicine  History & Physical    Patient Name: Miguel Moore  MRN: [id number]  Patient Class: IP- Inpatient  Admission Date: 5/10/2024  Attending Physician: Moises Coles III, MD   Primary Care Provider: Raciel Raymond MD         Patient information was obtained from patient, past medical records, and ER records.     Subjective:     Principal Problem:Open wound of right foot    Chief Complaint:   Chief Complaint   Patient presents with    Wound Infection     Presents to the ED via EMS from dialysis after his wound care doc sent him to ED for possible new infection in wound. Sent to ED to be evaluated.         HPI: 68 y.o. male with hypertension, BPH, chronic right lower extremity DVT, DM2, dyslipidemia, ESRD on HD, hemiplegia and hemiparesis following cerebral infarction affecting the right dominant side, paroxysmal AFib, seizure disorder as sequela of CVA sent to the ED from wound care for new wound infection.  Denies fever, chills, cough, shortness breath, chest pain, dizziness, syncope.  Was hospitalized in March for cellulitis and abscess to the right foot with surgical intervention at that time.  In the ED today, CRP and procalcitonin elevated.  Blood and wound cultures were obtained.  IV antibiotics initiated.    Past Medical History:   Diagnosis Date    Allergy     Clotting disorder     Elaquis and APlavix    Deep vein thrombosis     Diabetes mellitus, type 2     Hypertension     Nuclear sclerosis of both eyes 6/9/2017    Renal disorder     Seizures     Stroke     Urinary tract infection        Past Surgical History:   Procedure Laterality Date    CATARACT EXTRACTION W/  INTRAOCULAR LENS IMPLANT Right 10/05/2017    Dr. Tay    CATARACT EXTRACTION W/  INTRAOCULAR LENS IMPLANT Left 10/19/2017    Dr. Tay    CYSTOSCOPY W/ RETROGRADES Bilateral 2/18/2021    Procedure: CYSTOSCOPY, WITH RETROGRADE PYELOGRAM;  Surgeon: JEMMA Styles MD;  Location: [facility] OR;   Service: Urology;  Laterality: Bilateral;  REQUESTING EARLY AS POSSIBE-LO / 2/8/2021 @ 1:13PM  RN Pre Op 2-11-21, Covid NEGATIVE ON  2-17-21.  C A    ESOPHAGOGASTRODUODENOSCOPY N/A 8/17/2020    Procedure: EGD (ESOPHAGOGASTRODUODENOSCOPY);  Surgeon: Desmond Chapman MD;  Location: [facility] ENDO;  Service: Endoscopy;  Laterality: N/A;    EYE SURGERY Bilateral     cataract    FEMORAL ARTERY STENT      FRACTURE SURGERY      INCISION AND DRAINAGE, LOWER EXTREMITY Right 4/4/2024    Procedure: INCISION AND DRAINAGE, LOWER EXTREMITY;  Surgeon: Jimbo Montilla III, MD;  Location: [facility] OR;  Service: Orthopedics;  Laterality: Right;    INCISION AND DRAINAGE, LOWER EXTREMITY Right 4/6/2024    Procedure: INCISION AND DRAINAGE, LOWER EXTREMITY;  Surgeon: Desmond Barillas MD;  Location: [facility] OR;  Service: Orthopedics;  Laterality: Right;  foot and ankle    INCISION AND DRAINAGE, LOWER EXTREMITY Right 4/9/2024    Procedure: INCISION AND DRAINAGE, LOWER EXTREMITY- FOOT & ANKLE;  Surgeon: Jimbo Montilla III, MD;  Location: [facility] OR;  Service: Orthopedics;  Laterality: Right;  CULTURES, VASCHE    KNEE SURGERY      bilateral scope    WOUND DRESSING Right 4/9/2024    Procedure: WOUND VAC EXCHANGE;  Surgeon: Jimbo Montilla III, MD;  Location: [facility] OR;  Service: Orthopedics;  Laterality: Right;       Review of patient's allergies indicates:   Allergen Reactions    Ace inhibitors      Hyperkalemia 8/2018  Other reaction(s): Unknown    Penicillins Hives     Tolerated cefepime and cefdinir previously    Tizanidine      Felt hot       No current facility-administered medications on file prior to encounter.     Current Outpatient Medications on File Prior to Encounter   Medication Sig    apixaban (ELIQUIS) 5 mg Tab Take 1 tablet (5 mg total) by mouth 2 (two) times daily.    aspirin (ECOTRIN) 81 MG EC tablet Take 1 tablet (81 mg total) by mouth once daily.    dextrose 5 % in water (D5W) PgBk 50 mL with ceFAZolin 1 gram SolR Ancef 2g/2g/3g  after HD on HD days    ferrous gluconate (FERGON) 324 MG tablet Take 1 tablet (324 mg total) by mouth 2 (two) times daily with meals.    finasteride (PROSCAR) 5 mg tablet Take 1 tablet (5 mg total) by mouth once daily.    iron sucrose in NS (VENOFER) 100 mg/100 mL PgBk 50 mg.    labetaloL (NORMODYNE) 100 MG tablet Take 1 tablet (100 mg total) by mouth once daily.    methoxy peg-epoetin beta (MIRCERA INJ) 75 mcg.    mupirocin (BACTROBAN) 2 % ointment Apply to affected area 3 times daily    oxyCODONE-acetaminophen (PERCOCET)  mg per tablet Take 1 tablet by mouth every 6 (six) hours as needed for Pain.    RENVELA 800 mg Tab Take 1 tablet (800 mg total) by mouth 3 (three) times daily with meals.    tamsulosin (FLOMAX) 0.4 mg Cap Take 2 capsules (0.8 mg total) by mouth once daily.    vancomycin HCl (VANCOMYCIN 500 MG/100 ML D5W, READY TO MIX,) Vancomycin 500mg IV after HD on HD days (M/W/F)    vitamin renal formula, B-complex-vitamin c-folic acid, (NEPHROCAP) 1 mg Cap Take 1 capsule by mouth once daily.     Family History       Problem Relation (Age of Onset)    Cataracts Mother    Diabetes Mother    Heart disease Mother    Hypertension Mother, Sister    No Known Problems Father, Brother, Maternal Aunt, Maternal Uncle, Paternal Aunt, Paternal Uncle, Maternal Grandmother, Maternal Grandfather, Paternal Grandmother, Paternal Grandfather, Daughter, Other          Tobacco Use    Smoking status: Never    Smokeless tobacco: Never   Substance and Sexual Activity    Alcohol use: No    Drug use: No    Sexual activity: Not on file     Review of Systems   Constitutional:  Negative for chills and fever.   HENT:  Negative for congestion and rhinorrhea.    Eyes:  Negative for photophobia and visual disturbance.   Respiratory:  Negative for cough and shortness of breath.    Cardiovascular:  Negative for chest pain, palpitations and leg swelling.   Gastrointestinal:  Negative for abdominal pain, diarrhea, nausea and vomiting.    Genitourinary:  Negative for frequency, hematuria and urgency.   Skin:  Positive for wound. Negative for pallor and rash.   Neurological:  Negative for light-headedness and headaches.   Psychiatric/Behavioral:  Negative for confusion and decreased concentration.      Objective:     Vital Signs (Most Recent):  Temp: 98.3 °F (36.8 °C) (05/10/24 1322)  Pulse: 80 (05/10/24 1322)  Resp: 18 (05/10/24 1322)  BP: 126/77 (05/10/24 1322)  SpO2: 100 % (05/10/24 1322) Vital Signs (24h Range):  Temp:  [98.3 °F (36.8 °C)] 98.3 °F (36.8 °C)  Pulse:  [80] 80  Resp:  [18] 18  SpO2:  [100 %] 100 %  BP: (126)/(77) 126/77     Weight: 90.3 kg (199 lb)  Body mass index is 30.26 kg/m².     Physical Exam  Vitals and nursing note reviewed.   Constitutional:       General: He is not in acute distress.     Appearance: He is well-developed.   HENT:      Head: Normocephalic and atraumatic.      Right Ear: External ear normal.      Left Ear: External ear normal.      Nose: Nose normal.   Eyes:      Conjunctiva/sclera: Conjunctivae normal.   Cardiovascular:      Rate and Rhythm: Normal rate and regular rhythm.   Pulmonary:      Effort: Pulmonary effort is normal. No respiratory distress.   Abdominal:      General: There is no distension.      Palpations: Abdomen is soft.   Skin:     Findings: Wound present.   Neurological:      Mental Status: He is alert and oriented to person, place, and time.   Psychiatric:         Thought Content: Thought content normal.                Significant Labs: All pertinent labs within the past 24 hours have been reviewed.    Significant Imaging: I have reviewed all pertinent imaging results/findings within the past 24 hours.  Assessment/Plan:     * Open wound of right foot  Concern for infection, cultures pending, continue IV antibiotics.    Paroxysmal atrial fibrillation  Patient with Paroxysmal (<7 days) atrial fibrillation which is controlled currently with Beta Blocker. Patient is currently in sinus  "rhythm.[id number] Score: 2. Anticoagulation indicated. Anticoagulation done with apixaban .    Anemia in chronic kidney disease  Patient's anemia is currently controlled. Has not received any PRBCs to date. Etiology likely d/t chronic disease due to ESRD  Current CBC reviewed-   Lab Results   Component Value Date    HGB 9.7 (L) 05/10/2024    HCT 30.2 (L) 05/10/2024     Monitor serial CBC and transfuse if patient becomes hemodynamically unstable, symptomatic or H/H drops below 7/21.    Type 2 diabetes mellitus with diabetic neuropathy, with long-term current use of insulin  Patient's FSGs are controlled on current medication regimen.  Last A1c reviewed-   Lab Results   Component Value Date    HGBA1C 5.8 (H) 03/20/2024     Most recent fingerstick glucose reviewed- No results for input(s): "POCTGLUCOSE" in the last 24 hours.  Current correctional scale  Medium  Maintain anti-hyperglycemic dose as follows-   Antihyperglycemics (From admission, onward)      Start     Stop Route Frequency Ordered    05/10/24 1858  insulin aspart U-100 pen 0-10 Units         -- SubQ Before meals & nightly PRN 05/10/24 1758          Hold Oral hypoglycemics while patient is in the hospital.    ESRD (end stage renal disease)  Creatine stable for now. BMP reviewed- noted Estimated Creatinine Clearance: 20.6 mL/min (A) (based on SCr of 3.7 mg/dL (H)). according to latest data. Based on current GFR, CKD stage is end stage.  Monitor UOP and serial BMP and adjust therapy as needed. Renally dose meds. Avoid nephrotoxic medications and procedures.    Hemiplegia and hemiparesis following cerebral infarction affecting right dominant side  At baseline    BPH (benign prostatic hyperplasia) 2/18/21 prostate bx normal  Continue home regimen of tamsulosin and finasteride    Dyslipidemia  Statins held due to recent liver enzyme elevation    Benign essential HTN  Chronic, uncontrolled. Latest blood pressure and vitals reviewed-     Temp:  [98.3 °F (36.8 °C)] "   Pulse:  [80]   Resp:  [18]   BP: (126)/(77)   SpO2:  [100 %] .   Home meds for hypertension were reviewed and noted below.   Hypertension Medications               labetaloL (NORMODYNE) 100 MG tablet Take 1 tablet (100 mg total) by mouth once daily.            While in the hospital, will manage blood pressure as follows; Continue home antihypertensive regimen    Will utilize p.r.n. blood pressure medication only if patient's blood pressure greater than 180/110 and he develops symptoms such as worsening chest pain or shortness of breath.      VTE Risk Mitigation (From admission, onward)           Ordered     apixaban tablet 5 mg  2 times daily         05/10/24 1758     IP VTE HIGH RISK PATIENT  Once         05/10/24 1758     Place sequential compression device  Until discontinued         05/10/24 1758     Reason for No Pharmacological VTE Prophylaxis  Once        Question:  Reasons:  Answer:  Already adequately anticoagulated on oral Anticoagulants    05/10/24 1758                               Pharmacokinetic Initial Assessment: IV Vancomycin    Assessment/Plan:    Vancomycin Random level 20.4 on 5/10/24  No load dose to be given  Patient receiving Vancomycin on MWF w/Dialysis on  Outpatient basis  Desired empiric serum trough concentration is 10 to 20 mcg/mL  Draw vancomycin random level on 5/11 at 04:00.  Pharmacy will continue to follow and monitor vancomycin.      Please contact pharmacy at extension [phone] with any questions regarding this assessment.     Thank you for the consult,   Siomara Curtis       Patient brief summary:  Miguel Moore is a 68 y.o. male initiated on antimicrobial therapy with IV Vancomycin for treatment of suspected skin & soft tissue infection    Drug Allergies:   Review of patient's allergies indicates:   Allergen Reactions    Ace inhibitors      Hyperkalemia 8/2018  Other reaction(s): Unknown    Penicillins Hives     Tolerated cefepime and cefdinir previously    Tizanidine      Felt  hot       Actual Body Weight:   90.3 kg    Renal Function:   Estimated Creatinine Clearance: 20.6 mL/min (A) (based on SCr of 3.7 mg/dL (H)).,     Dialysis Method (if applicable):  intermittent HD    CBC (last 72 hours):  Recent Labs   Lab Result Units 05/10/24  1546   WBC K/uL 4.63   Hemoglobin g/dL 9.7*   Hematocrit % 30.2*   Platelets K/uL 186   Gran % % 69.9   Lymph % % 18.1   Mono % % 8.4   Eosinophil % % 3.0   Basophil % % 0.4   Differential Method  Automated       Metabolic Panel (last 72 hours):  Recent Labs   Lab Result Units 05/10/24  1546   Sodium mmol/L 139   Potassium mmol/L 3.6   Chloride mmol/L 100   CO2 mmol/L 29   Glucose mg/dL 97   BUN mg/dL 11   Creatinine mg/dL 3.7*   Albumin g/dL 2.0*   Total Bilirubin mg/dL 0.4   Alkaline Phosphatase U/L 76   AST U/L 13   ALT U/L <5*       Drug levels (last 3 results):  Recent Labs   Lab Result Units 05/10/24  1546   Vancomycin, Random ug/mL 20.4       Microbiologic Results:  Microbiology Results (last 7 days)       Procedure Component Value Units Date/Time    Aerobic culture (Specify Source) **CANNOT BE ORDERED AS STAT** [[id number]] Collected: 05/10/24 1703    Order Status: Sent Specimen: Abscess from Foot, Right Updated: 05/10/24 1728    Blood culture #1 [[id number]] Collected: 05/10/24 1545    Order Status: Sent Specimen: Blood from Peripheral, Antecubital, Right Updated: 05/10/24 1555    Blood culture #2 [[id number]] Collected: 05/10/24 1547    Order Status: Sent Specimen: Blood from Peripheral, Hand, Right Updated: 05/10/24 1555              AdmissionCare    Guideline: Wound and Skin Management, Inpt/Amb    Based on the indications selected for the patient, the bed status of Inpatient was determined to be MET    The following indications were selected as present at the time of evaluation of the patient:      - Incision or drainage of skin or subcutaneous tissue (eg, perirectal abscess) needed   - Wound debridement or complex care needed   - Soft tissue  debridement or exploration needed (eg, bite)    Operative Status Criteria selected: Inpatient: for other surgeries and procedures, inpatient stay will usually be needed because of:            - Inpatient procedure: Benchmark Length of Stay of 1 day or more; see Search for specific procedure BLOS.   - Inpatient care procedures for skin diseases as noted in Clinical Indications for Procedure   - Wound and Skin Procedures and Care AND 1 or more General Admission Criteria or Pediatric General Admission Criteria   - Procedure is usually performed on an ambulatory basis, but inpatient stay is needed. See Ambulatory Surgery Exception Criteria.    AdmissionCare documentation entered by: Andres Peacock    [facility] In[employer], 27th edition, Copyright © 2023 [facility] [employer] [facility] All Rights Reserved.  [id number]:10:33-05:00    Ajay Baxter Jr., APRN, AGACNP-BC  Hospitalist - Department of Hospital Medicine  [facility] [address] [place] [address]  Office #: [phone]; Pager #: [phone]

## 2024-05-11 NOTE — SUBJECTIVE & OBJECTIVE
Interval History: He currently has no pain. He is hungry and is waiting to hear if he needs an amputation. He states he does not want further surgeries on his foot as they have not been effective. He received dialysis as scheduled yesterday without issues.     Review of Systems   Constitutional:  Negative for chills and fever.   Respiratory:  Negative for shortness of breath.    Cardiovascular:  Negative for chest pain.   Gastrointestinal:  Negative for abdominal pain.   Skin:  Positive for wound.   Psychiatric/Behavioral:  Negative for confusion.      Objective:     Vital Signs (Most Recent):  Temp: 97.9 °F (36.6 °C) (05/11/24 1151)  Pulse: 62 (05/11/24 1151)  Resp: 18 (05/11/24 1151)  BP: 128/60 (05/11/24 1151)  SpO2: 99 % (05/11/24 1151) Vital Signs (24h Range):  Temp:  [97.8 °F (36.6 °C)-98.5 °F (36.9 °C)] 97.9 °F (36.6 °C)  Pulse:  [62-88] 62  Resp:  [16-20] 18  SpO2:  [95 %-100 %] 99 %  BP: (126-199)/(60-86) 128/60     Weight: 77 kg (169 lb 12.1 oz)  Body mass index is 25.81 kg/m².    Intake/Output Summary (Last 24 hours) at 5/11/2024 1206  Last data filed at 5/10/2024 1834  Gross per 24 hour   Intake 100 ml   Output --   Net 100 ml         Physical Exam  Vitals and nursing note reviewed.   Constitutional:       General: He is not in acute distress.     Appearance: He is not ill-appearing or toxic-appearing.   HENT:      Head: Normocephalic and atraumatic.   Cardiovascular:      Rate and Rhythm: Normal rate and regular rhythm.   Pulmonary:      Effort: Pulmonary effort is normal.      Breath sounds: Normal breath sounds.      Comments: Room air  Abdominal:      General: Bowel sounds are normal.      Palpations: Abdomen is soft.      Tenderness: There is no abdominal tenderness.   Musculoskeletal:      Right lower leg: No edema.      Left lower leg: No edema.   Skin:     Comments: LUE AVF with thrill. See photos of feet   Neurological:      Mental Status: He is alert. Mental status is at baseline.       LEFT  heel       RIGHT FOOT/LEG                Significant Labs: All pertinent labs within the past 24 hours have been reviewed.    Significant Imaging: I have reviewed all pertinent imaging results/findings within the past 24 hours.

## 2024-05-11 NOTE — ASSESSMENT & PLAN NOTE
Patient's FSGs are controlled on current medication regimen.  Last A1c reviewed-   Lab Results   Component Value Date    HGBA1C 5.8 (H) 03/20/2024     Most recent fingerstick glucose reviewed-   Recent Labs   Lab 05/10/24  1819 05/10/24  2020 05/11/24  0755 05/11/24  1150   POCTGLUCOSE 109 128* 93 109     Current correctional scale  Medium  Maintain anti-hyperglycemic dose as follows-   Antihyperglycemics (From admission, onward)      Start     Stop Route Frequency Ordered    05/10/24 1858  insulin aspart U-100 pen 0-10 Units         -- SubQ Before meals & nightly PRN 05/10/24 4121

## 2024-05-11 NOTE — ASSESSMENT & PLAN NOTE
Chronic, controlled. Latest blood pressure and vitals reviewed-     Temp:  [97.8 °F (36.6 °C)-98.5 °F (36.9 °C)]   Pulse:  [62-88]   Resp:  [16-20]   BP: (126-199)/(60-86)   SpO2:  [95 %-100 %] .   Home meds for hypertension were reviewed and noted below.   Hypertension Medications               labetaloL (NORMODYNE) 100 MG tablet Take 1 tablet (100 mg total) by mouth once daily.            While in the hospital, will manage blood pressure as follows; Continue home antihypertensive regimen    Will utilize p.r.n. blood pressure medication only if patient's blood pressure greater than 180/110 and he develops symptoms such as worsening chest pain or shortness of breath.

## 2024-05-11 NOTE — ASSESSMENT & PLAN NOTE
Worsening R foot wounds. Home health nursing noted purulence. No pain.   - blood cultures with 1/4 GPC clusters. Repeat today. Pending results, will need TTE/SONY  - continue vanc, meropenem given history of ESBL organisms  - he does not want further surgeries on his foot and is preparing himself mentally for amputation. Orth[facility]edics ([facility]) consulted  - his L foot also has a wound- check XR

## 2024-05-11 NOTE — ASSESSMENT & PLAN NOTE
Patient with Paroxysmal (<7 days) atrial fibrillation which is controlled currently with Beta Blocker. Patient is currently in sinus rhythm.[id number] Score: 2. Anticoagulation indicated. Anticoagulation done with apixaban .

## 2024-05-11 NOTE — ASSESSMENT & PLAN NOTE
3/20/24 RLE arterial US Arterial vasculature of the right lower extremity is patent.  However, there is evidence of atherosclerotic change with stenosis at the level of popliteal artery.   - continue asa  - ok to resume statin now that LFTs are normal

## 2024-05-11 NOTE — ASSESSMENT & PLAN NOTE
Patient's anemia is currently controlled. Has not received any PRBCs to date. Etiology likely d/t chronic disease due to ESRD  Current CBC reviewed-   Lab Results   Component Value Date    HGB 9.7 (L) 05/11/2024    HCT 31.6 (L) 05/11/2024     Monitor serial CBC and transfuse if patient becomes hemodynamically unstable, symptomatic or H/H drops below 7/21.

## 2024-05-11 NOTE — PROGRESS NOTES
[facility] Medicine  Progress Note    Patient Name: Miguel Moore  MRN: [id number]  Patient Class: IP- Inpatient   Admission Date: 5/10/2024  Length of Stay: 1 days  Attending Physician: Patricia Reddy MD  Primary Care Provider: Raciel Raymond MD        Subjective:     Principal Problem:Open wound of right foot        HPI:  68 y.o. male with hypertension, BPH, chronic right lower extremity DVT, DM2, dyslipidemia, ESRD on HD, hemiplegia and hemiparesis following cerebral infarction affecting the right dominant side, paroxysmal AFib, seizure disorder as sequela of CVA sent to the ED from wound care for new wound infection.  Denies fever, chills, cough, shortness breath, chest pain, dizziness, syncope.  Was hospitalized in March for cellulitis and abscess to the right foot with surgical intervention at that time.  In the ED today, CRP and procalcitonin elevated.  Blood and wound cultures were obtained.  IV antibiotics initiated.    Overview/Hospital Course:  Mr Miguel Moore was admitted with worsening wound to his right foot. Started vancomycin and meropenem given his history of ESBL organisms. He states he is not interested in further I&D on his right foot and is mentally preparing himself for amputation. Orthopaedics consulted.     Interval History: He currently has no pain. He is hungry and is waiting to hear if he needs an amputation. He states he does not want further surgeries on his foot as they have not been effective. He received dialysis as scheduled yesterday without issues.     Review of Systems   Constitutional:  Negative for chills and fever.   Respiratory:  Negative for shortness of breath.    Cardiovascular:  Negative for chest pain.   Gastrointestinal:  Negative for abdominal pain.   Skin:  Positive for wound.   Psychiatric/Behavioral:  Negative for confusion.      Objective:     Vital Signs (Most Recent):  Temp: 97.9 °F (36.6 °C) (05/11/24 1151)  Pulse: 62 (05/11/24  1151)  Resp: 18 (05/11/24 1151)  BP: 128/60 (05/11/24 1151)  SpO2: 99 % (05/11/24 1151) Vital Signs (24h Range):  Temp:  [97.8 °F (36.6 °C)-98.5 °F (36.9 °C)] 97.9 °F (36.6 °C)  Pulse:  [62-88] 62  Resp:  [16-20] 18  SpO2:  [95 %-100 %] 99 %  BP: (126-199)/(60-86) 128/60     Weight: 77 kg (169 lb 12.1 oz)  Body mass index is 25.81 kg/m².    Intake/Output Summary (Last 24 hours) at 5/11/2024 1206  Last data filed at 5/10/2024 1834  Gross per 24 hour   Intake 100 ml   Output --   Net 100 ml         Physical Exam  Vitals and nursing note reviewed.   Constitutional:       General: He is not in acute distress.     Appearance: He is not ill-appearing or toxic-appearing.   HENT:      Head: Normocephalic and atraumatic.   Cardiovascular:      Rate and Rhythm: Normal rate and regular rhythm.   Pulmonary:      Effort: Pulmonary effort is normal.      Breath sounds: Normal breath sounds.      Comments: Room air  Abdominal:      General: Bowel sounds are normal.      Palpations: Abdomen is soft.      Tenderness: There is no abdominal tenderness.   Musculoskeletal:      Right lower leg: No edema.      Left lower leg: No edema.   Skin:     Comments: LUE AVF with thrill. See photos of feet   Neurological:      Mental Status: He is alert. Mental status is at baseline.       LEFT heel       RIGHT FOOT/LEG                Significant Labs: All pertinent labs within the past 24 hours have been reviewed.    Significant Imaging: I have reviewed all pertinent imaging results/findings within the past 24 hours.    Assessment/Plan:      * Open wound of right foot  Worsening R foot wounds. Home health nursing noted purulence. No pain.   - blood cultures with 1/4 GPC clusters. Repeat today. Pending results, will need TTE/SONY  - continue vanc, meropenem given history of ESBL organisms  - he does not want further surgeries on his foot and is preparing himself mentally for amputation. Orth[facility]edics ([facility] consulted  - his L foot  also has a wound- check XR    Peripheral artery disease  3/20/24 RLE arterial US Arterial vasculature of the right lower extremity is patent.  However, there is evidence of atherosclerotic change with stenosis at the level of popliteal artery.   - continue asa  - ok to resume statin now that LFTs are normal      Paroxysmal atrial fibrillation  Patient with Paroxysmal (<7 days) atrial fibrillation which is controlled currently with Beta Blocker. Patient is currently in sinus rhythm.[id number] Score: 2. Anticoagulation indicated. Anticoagulation done with apixaban .    Anemia in chronic kidney disease  Patient's anemia is currently controlled. Has not received any PRBCs to date. Etiology likely d/t chronic disease due to ESRD  Current CBC reviewed-   Lab Results   Component Value Date    HGB 9.7 (L) 05/11/2024    HCT 31.6 (L) 05/11/2024     Monitor serial CBC and transfuse if patient becomes hemodynamically unstable, symptomatic or H/H drops below 7/21.    Type 2 diabetes mellitus with diabetic neuropathy, with long-term current use of insulin  Patient's FSGs are controlled on current medication regimen.  Last A1c reviewed-   Lab Results   Component Value Date    HGBA1C 5.8 (H) 03/20/2024     Most recent fingerstick glucose reviewed-   Recent Labs   Lab 05/10/24  1819 05/10/24  2020 05/11/24  0755 05/11/24  1150   POCTGLUCOSE 109 128* 93 109     Current correctional scale  Medium  Maintain anti-hyperglycemic dose as follows-   Antihyperglycemics (From admission, onward)      Start     Stop Route Frequency Ordered    05/10/24 1858  insulin aspart U-100 pen 0-10 Units         -- SubQ Before meals & nightly PRN 05/10/24 1758            Secondary hyperparathyroidism of renal origin  Continue sevelamer      ESRD (end stage renal disease)  Receives HD MWF via LUE AVF. Has not missed any sessions.   - Nephrology consulted  - appears euvolemic, no indication for acute dialysis today    Hemiplegia and hemiparesis following  cerebral infarction affecting right dominant side  At baseline  Continue eliquis/asa, BP Rx  LFTs normalized- can resume statin now     BPH (benign prostatic hyperplasia) 2/18/21 prostate bx normal  Continue home regimen of tamsulosin and finasteride    Dyslipidemia  Not currently on statin due to previously elevated LFTs. LFTs now normal- can resume statin     Benign essential HTN  Chronic, controlled. Latest blood pressure and vitals reviewed-     Temp:  [97.8 °F (36.6 °C)-98.5 °F (36.9 °C)]   Pulse:  [62-88]   Resp:  [16-20]   BP: (126-199)/(60-86)   SpO2:  [95 %-100 %] .   Home meds for hypertension were reviewed and noted below.   Hypertension Medications               labetaloL (NORMODYNE) 100 MG tablet Take 1 tablet (100 mg total) by mouth once daily.            While in the hospital, will manage blood pressure as follows; Continue home antihypertensive regimen    Will utilize p.r.n. blood pressure medication only if patient's blood pressure greater than 180/110 and he develops symptoms such as worsening chest pain or shortness of breath.      VTE Risk Mitigation (From admission, onward)           Ordered     apixaban tablet 5 mg  2 times daily         05/10/24 1758     IP VTE HIGH RISK PATIENT  Once         05/10/24 1758     Place sequential compression device  Until discontinued         05/10/24 1758     Reason for No Pharmacological VTE Prophylaxis  Once        Question:  Reasons:  Answer:  Already adequately anticoagulated on oral Anticoagulants    05/10/24 1758                    Discharge Planning   GARY:      Code Status: Full Code   Is the patient medically ready for discharge?:     Reason for patient still in hospital (select all that apply): Patient trending condition                     Patricia Reddy MD  Department of Hospital Medicine   [facility] - Observation

## 2024-05-11 NOTE — HOSPITAL COURSE
67yo M ESRD, PAD, R foot wounds s/p multiple surgeries admitted w worsening wound. Did have Staph epi in blood culture, likely contaminant. He does not want further debridement, wants BKA if needed. Started vancomycin and meropenem given his history of ESBL organisms. [facility] consulted. Vascular consulted- plan for RLE angiogram on 05/16 . Nephro following for HD. He also has L heel dark area, XR left heel with Osseous structures demonstrate no evidence for acute fracture or osseous destructive lesion.  Mild diffuse demineralization. wound care consulted.  Patient went for I and D by ortho with cultures taken.  Id continued to follow and cultures negative.  Id recommending discharge with doxycycline for 14 days to complete a course.  Patient had dialysis prior to discharge home with home health to continue wound care with vascular, ID, and orthopedic follow up.  Patient endorsed understanding of plan and all questions answered prior to discharge home

## 2024-05-11 NOTE — CONSULTS
Ortho Consult    Chief Complaint   Patient presents with    Wound Infection     Presents to the ED via EMS from dialysis after his wound care doc sent him to ED for possible new infection in wound. Sent to ED to be evaluated.        History of present illness:    68-year-old male with end-stage renal disease on dialysis known to me status post I and D of right ankle abscess 4/4, 4/6, 4/9.  He presents back after noticing worsening infection with home health.    Past Medical History:   Diagnosis Date    Allergy     Clotting disorder     Elaquis and APlavix    Deep vein thrombosis     Diabetes mellitus, type 2     Hypertension     Nuclear sclerosis of both eyes 6/9/2017    Renal disorder     Seizures     Stroke     Urinary tract infection        Past Surgical History:   Procedure Laterality Date    CATARACT EXTRACTION W/  INTRAOCULAR LENS IMPLANT Right 10/05/2017    Dr. Tay    CATARACT EXTRACTION W/  INTRAOCULAR LENS IMPLANT Left 10/19/2017    Dr. Tay    CYSTOSCOPY W/ RETROGRADES Bilateral 2/18/2021    Procedure: CYSTOSCOPY, WITH RETROGRADE PYELOGRAM;  Surgeon: JEMMA Styles MD;  Location: [facility] OR;  Service: Urology;  Laterality: Bilateral;  REQUESTING EARLY AS POSSIBE-LO / 2/8/2021 @ 1:13PM  RN Pre Op 2-11-21, Covid NEGATIVE ON  2-17-21.  C A    ESOPHAGOGASTRODUODENOSCOPY N/A 8/17/2020    Procedure: EGD (ESOPHAGOGASTRODUODENOSCOPY);  Surgeon: Desmond Chapman MD;  Location: [facility];  Service: Endoscopy;  Laterality: N/A;    EYE SURGERY Bilateral     cataract    FEMORAL ARTERY STENT      FRACTURE SURGERY      INCISION AND DRAINAGE, LOWER EXTREMITY Right 4/4/2024    Procedure: INCISION AND DRAINAGE, LOWER EXTREMITY;  Surgeon: Jimbo Montilla III, MD;  Location: [facility] OR;  Service: Orthopedics;  Laterality: Right;    INCISION AND DRAINAGE, LOWER EXTREMITY Right 4/6/2024    Procedure: INCISION AND DRAINAGE, LOWER EXTREMITY;  Surgeon: Desmond Barillas MD;  Location: [facility] OR;  Service:  Orthopedics;  Laterality: Right;  foot and ankle    INCISION AND DRAINAGE, LOWER EXTREMITY Right 4/9/2024    Procedure: INCISION AND DRAINAGE, LOWER EXTREMITY- FOOT & ANKLE;  Surgeon: Jimbo Montilla III, MD;  Location: [facility] OR;  Service: Orthopedics;  Laterality: Right;  CULTURES, VASCHE    KNEE SURGERY      bilateral scope    WOUND DRESSING Right 4/9/2024    Procedure: WOUND VAC EXCHANGE;  Surgeon: Jimbo Montilla III, MD;  Location: [facility] OR;  Service: Orthopedics;  Laterality: Right;       Review of patient's allergies indicates:   Allergen Reactions    Ace inhibitors      Hyperkalemia 8/2018  Other reaction(s): Unknown    Penicillins Hives     Tolerated cefepime and cefdinir previously    Tizanidine      Felt hot       Prior to Admission medications    Medication Sig Start Date End Date Taking? Authorizing Provider   apixaban (ELIQUIS) 5 mg Tab Take 1 tablet (5 mg total) by mouth 2 (two) times daily. 10/26/23   Raciel Raymond MD   aspirin (ECOTRIN) 81 MG EC tablet Take 1 tablet (81 mg total) by mouth once daily. 10/26/23 10/25/24  Raciel Raymond MD   dextrose 5 % in water (D5W) PgBk 50 mL with ceFAZolin 1 gram SolR Ancef 2g/2g/3g after HD on HD days 4/19/24 5/20/24  Rosalva Manning MD   ferrous gluconate (FERGON) 324 MG tablet Take 1 tablet (324 mg total) by mouth 2 (two) times daily with meals. 4/19/24 4/19/25  Rosalva Manning MD   finasteride (PROSCAR) 5 mg tablet Take 1 tablet (5 mg total) by mouth once daily. 2/27/24 2/26/25  Lee Styles MD   iron sucrose in NS (VENOFER) 100 mg/100 mL PgBk 50 mg. 12/15/23 12/13/24  Provider, Historical   labetaloL (NORMODYNE) 100 MG tablet Take 1 tablet (100 mg total) by mouth once daily. 10/26/23   Raciel Raymond MD   methoxy peg-epoetin beta (MIRCERA INJ) 75 mcg. 1/19/24 1/17/25  Provider, Historical   mupirocin (BACTROBAN) 2 % ointment Apply to affected area 3 times daily 1/18/24   Lucinda Jimenez NP   oxyCODONE-acetaminophen (PERCOCET)   "mg per tablet Take 1 tablet by mouth every 6 (six) hours as needed for Pain. 5/9/24   Raciel Raymond MD   RENVELA 800 mg Tab Take 1 tablet (800 mg total) by mouth 3 (three) times daily with meals. 8/2/22   Raciel Raymond MD   tamsulosin (FLOMAX) 0.4 mg Cap Take 2 capsules (0.8 mg total) by mouth once daily. 2/27/24   Lee Styles MD   vancomycin HCl (VANCOMYCIN 500 MG/100 ML D5W, READY TO MIX,) Vancomycin 500mg IV after HD on HD days (M/W/F) 4/19/24 5/20/24  Rosalva Manning MD   vitamin renal formula, B-complex-vitamin c-folic acid, (NEPHROCAP) 1 mg Cap Take 1 capsule by mouth once daily. 4/20/24   Rosalva Manning MD       Social History     Occupational History    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, etc   Tobacco Use    Smoking status: Never    Smokeless tobacco: Never   Substance and Sexual Activity    Alcohol use: No    Drug use: No    Sexual activity: Not on file       Temp:  [97.8 °F (36.6 °C)-98.7 °F (37.1 °C)] 98.7 °F (37.1 °C)  Pulse:  [62-88] 85  Resp:  [16-20] 18  SpO2:  [95 %-100 %] 100 %  BP: (128-199)/(60-86) 143/65  Height: 5' 8" (172.7 cm)  Weight: 77 kg (169 lb 12.1 oz)  BMI (Calculated): 25.8    Physical examination:    Awake male in bed.  Has a depressed affect.  Right ankle reveals granulating wound on the lateral aspect of his ankle.  His foot reveals patchy areas of necrosis particularly on the plantar aspect around the 4th and 5th MTP joints.  Has a central abscess in his plantar aspect it is draining some seropurulent fluid.  I can express some fluid from the area.  He has a darkened area on his heel as well.  More proximally he has evidence of venous stasis and thickened skin.  He has bilateral midline scars for his knees.      Recent Labs   Lab 05/11/24  0457   WBC 5.40   RBC 3.74*   HGB 9.7*   HCT 31.6*      MCV 85   MCH 25.9*   MCHC 30.7*     Recent Labs   Lab 05/11/24  0457   CALCIUM 8.2*   ALBUMIN 1.9*   PROT 5.6*      K 3.6   CO2 27 " "     BUN 16   CREATININE 4.5*   ALKPHOS 73   ALT <5*   AST 11   BILITOT 0.3     No results for input(s): "PT", "INR", "APTT" in the last 24 hours.    Imaging Results              X-Ray Foot 2 View Right (Final result)  Result time 05/10/24 16:35:51   Procedure changed from X-Ray Foot Complete Right     Final result by Trace Her MD (05/10/24 16:35:51)                   Impression:      1. Significantly limited evaluation of the foot given positioning.  There is diffuse edema about the foot, no definite acute displaced fracture or dislocation.      Electronically signed by: Trace Her MD  Date:    05/10/2024  Time:    16:35               Narrative:    EXAMINATION:  XR FOOT 2 VIEW RIGHT    CLINICAL HISTORY:  foot ulcer;  Non-pressure chronic ulcer of other part of unspecified foot with unspecified severity    COMPARISON:  None    FINDINGS:  Two views right foot.    There is suboptimal positioning, limiting evaluation of the foot.  Allowing for this, there is osteopenia and prominent degenerative change of the foot noting Charcot appearance.  The ankle mortise appears intact.  There is diffuse edema about the foot.  No convincing acute displaced fracture or dislocation.                                        Current Facility-Administered Medications:     acetaminophen tablet 650 mg, 650 mg, Oral, Q6H PRN, Ajay Baxter Jr., NP    albuterol-ipratropium 2.5 mg-0.5 mg/3 mL nebulizer solution 3 mL, 3 mL, Nebulization, Q4H PRN, Ajay Baxter Jr., NP    aluminum-magnesium hydroxide-simethicone 200-200-20 mg/5 mL suspension 30 mL, 30 mL, Oral, QID PRN, Ajay Baxter Jr., NP    apixaban tablet 5 mg, 5 mg, Oral, BID, Ajay Baxter Jr., NP, 5 mg at 05/11/24 1214    aspirin EC tablet 81 mg, 81 mg, Oral, Daily, Ajay Baxter Jr., NP, 81 mg at 05/11/24 1214    [START ON 5/12/2024] atorvastatin tablet 80 mg, 80 mg, Oral, Daily, Patricia Reddy MD    dextrose 10% bolus 125 mL 125 mL, 12.5 g, " Intravenous, PRN, Ajay Baxter Jr., NP    dextrose 10% bolus 250 mL 250 mL, 25 g, Intravenous, PRN, Ajay Baxter Jr., NP    finasteride tablet 5 mg, 5 mg, Oral, Daily, Ajay Baxter Jr., NP, 5 mg at 05/11/24 0912    glucagon (human recombinant) injection 1 mg, 1 mg, Intramuscular, PRN, Ajay Baxter Jr., NP    glucose chewable tablet 16 g, 16 g, Oral, PRN, Ajay Baxter Jr., NP    glucose chewable tablet 24 g, 24 g, Oral, PRN, Ajay Baxter Jr., NP    insulin aspart U-100 pen 0-10 Units, 0-10 Units, Subcutaneous, QID (AC + HS) PRN, Ajay Baxter Jr., NP    labetaloL tablet 100 mg, 100 mg, Oral, Daily, Ajay Baxter Jr., NP, 100 mg at 05/11/24 0914    melatonin tablet 6 mg, 6 mg, Oral, Nightly PRN, Ajay Baxter Jr., NP    meropenem (MERREM) 500 mg in sodium chloride 0.9 % 100 mL IVPB (MB+), 500 mg, Intravenous, Q24H, Homer Caraballo MD, Stopped at 05/10/24 1834    mupirocin 2 % ointment, , Nasal, BID, Patricia Reddy MD    naloxone 0.4 mg/mL injection 0.02 mg, 0.02 mg, Intravenous, PRN, Ajay Baxter Jr., NP    ondansetron injection 4 mg, 4 mg, Intravenous, Q8H PRN, Ajay Baxter Jr., SHAYLA    oxyCODONE-acetaminophen 5-325 mg per tablet 1 tablet, 1 tablet, Oral, Q8H PRN, Ajay Baxter Jr., NP, 1 tablet at 05/11/24 1619    polyethylene glycol packet 17 g, 17 g, Oral, BID, Ajay Baxter Jr., NP, 17 g at 05/11/24 1216    prochlorperazine injection Soln 5 mg, 5 mg, Intravenous, Q6H PRN, Ajay Baxter Jr., NP    sevelamer carbonate tablet 800 mg, 800 mg, Oral, TID WM, Ajay Baxter Jr., NP, 800 mg at 05/11/24 1215    simethicone chewable tablet 80 mg, 1 tablet, Oral, QID PRN, Ajay Baxter Jr., NP    sodium chloride 0.9% flush 10 mL, 10 mL, Intravenous, Q8H PRN, Ajay Baxter Jr., NP    tamsulosin 24 hr capsule 0.8 mg, 2 capsule, Oral, Daily, Ajay Baxter Jr., NP, 0.8 mg at 05/11/24 0913    Pharmacy to dose Vancomycin consult, , , Once **AND** vancomycin - pharmacy to dose, ,  Intravenous, pharmacy to manage frequency, Homer Caraballo MD    vitamin renal formula (B-complex-vitamin c-folic acid) 1 mg per capsule 1 capsule, 1 capsule, Oral, Daily, Ajay Baxter Jr., NP, 1 capsule at 05/11/24 0968    Assessment and Plan:    68-year-old male with end-stage renal disease and right foot abscess.  He previously been treated for right ankle abscess.  He has evidence of necrosis of the skin on his foot.  He has a right foot plantar abscess draining purulent material.    I offered to take him to surgery for I and D of his right foot.  He reports he is tired of going back and forth to surgery and would rather consider amputation as he has been told that his blood flow is not good.    Amputation is a likely endpoint in this case and I agree with his line of thinking.  Hopefully he can heal a below-knee amputation.    Consult vascular surgery for evaluation.      Jimbo Montilla MD  [facility]  [phone]  This note has been transcribed with voice recognition software, but not reviewed and may contain unrecognized errors.

## 2024-05-11 NOTE — NURSING
[facility], [facility]  Nurses Note -- 4 Eyes      5/11/2024       Skin assessed on: Q Shift      [x] No Pressure Injuries Present    [x]Prevention Measures Documented    [] Yes LDA  for Pressure Injury Previously documented     [] Yes New Pressure Injury Discovered   [] LDA for New Pressure Injury Added      Attending RN:  Danielle Ryan LPN     Second RN:  DARRION Kessler

## 2024-05-12 LAB
ALBUMIN SERPL BCP-MCNC: 1.7 G/DL (ref 3.5–5.2)
ALP SERPL-CCNC: 63 U/L (ref 55–135)
ALT SERPL W/O P-5'-P-CCNC: <5 U/L (ref 10–44)
ANION GAP SERPL CALC-SCNC: 12 MMOL/L (ref 8–16)
ASCENDING AORTA: 3.09 CM
AST SERPL-CCNC: 10 U/L (ref 10–40)
AV INDEX (PROSTH): 0.81
AV MEAN GRADIENT: 5 MMHG
AV PEAK GRADIENT: 9 MMHG
AV VALVE AREA BY VELOCITY RATIO: 2.18 CM²
AV VALVE AREA: 2.52 CM²
AV VELOCITY RATIO: 0.7
BASOPHILS # BLD AUTO: 0.03 K/UL (ref 0–0.2)
BASOPHILS NFR BLD: 0.8 % (ref 0–1.9)
BILIRUB SERPL-MCNC: 0.3 MG/DL (ref 0.1–1)
BSA FOR ECHO PROCEDURE: 2.08 M2
BUN SERPL-MCNC: 22 MG/DL (ref 8–23)
CALCIUM SERPL-MCNC: 8.1 MG/DL (ref 8.7–10.5)
CHLORIDE SERPL-SCNC: 102 MMOL/L (ref 95–110)
CO2 SERPL-SCNC: 26 MMOL/L (ref 23–29)
CREAT SERPL-MCNC: 6 MG/DL (ref 0.5–1.4)
CV ECHO LV RWT: 0.45 CM
DIFFERENTIAL METHOD BLD: ABNORMAL
DOP CALC AO PEAK VEL: 1.48 M/S
DOP CALC AO VTI: 31.6 CM
DOP CALC LVOT AREA: 3.1 CM2
DOP CALC LVOT DIAMETER: 1.99 CM
DOP CALC LVOT PEAK VEL: 1.04 M/S
DOP CALC LVOT STROKE VOLUME: 79.58 CM3
DOP CALC MV VTI: 23.8 CM
DOP CALCLVOT PEAK VEL VTI: 25.6 CM
E WAVE DECELERATION TIME: 152.7 MSEC
E/A RATIO: 0.85
E/E' RATIO: 7.18 M/S
ECHO LV POSTERIOR WALL: 1.16 CM (ref 0.6–1.1)
EOSINOPHIL # BLD AUTO: 0.2 K/UL (ref 0–0.5)
EOSINOPHIL NFR BLD: 4.8 % (ref 0–8)
ERYTHROCYTE [DISTWIDTH] IN BLOOD BY AUTOMATED COUNT: 16.3 % (ref 11.5–14.5)
EST. GFR  (NO RACE VARIABLE): 9 ML/MIN/1.73 M^2
FRACTIONAL SHORTENING: 30 % (ref 28–44)
GLUCOSE SERPL-MCNC: 92 MG/DL (ref 70–110)
HCT VFR BLD AUTO: 27.1 % (ref 40–54)
HGB BLD-MCNC: 8.7 G/DL (ref 14–18)
IMM GRANULOCYTES # BLD AUTO: 0.02 K/UL (ref 0–0.04)
IMM GRANULOCYTES NFR BLD AUTO: 0.5 % (ref 0–0.5)
INTERVENTRICULAR SEPTUM: 1.13 CM (ref 0.6–1.1)
IVC DIAMETER: 1.33 CM
IVRT: 77.07 MSEC
LA MAJOR: 6.52 CM
LA MINOR: 5.67 CM
LA WIDTH: 5.2 CM
LEFT ATRIUM SIZE: 4.12 CM
LEFT ATRIUM VOLUME INDEX: 57.8 ML/M2
LEFT ATRIUM VOLUME: 110.45 CM3
LEFT INTERNAL DIMENSION IN SYSTOLE: 3.56 CM (ref 2.1–4)
LEFT VENTRICLE DIASTOLIC VOLUME INDEX: 65.3 ML/M2
LEFT VENTRICLE DIASTOLIC VOLUME: 124.73 ML
LEFT VENTRICLE MASS INDEX: 119 G/M2
LEFT VENTRICLE SYSTOLIC VOLUME INDEX: 27.8 ML/M2
LEFT VENTRICLE SYSTOLIC VOLUME: 53.12 ML
LEFT VENTRICULAR INTERNAL DIMENSION IN DIASTOLE: 5.12 CM (ref 3.5–6)
LEFT VENTRICULAR MASS: 227.45 G
LV LATERAL E/E' RATIO: 6.58 M/S
LV SEPTAL E/E' RATIO: 7.9 M/S
LVOT MG: 2.19 MMHG
LVOT MV: 0.68 CM/S
LYMPHOCYTES # BLD AUTO: 1.1 K/UL (ref 1–4.8)
LYMPHOCYTES NFR BLD: 27 % (ref 18–48)
MCH RBC QN AUTO: 26 PG (ref 27–31)
MCHC RBC AUTO-ENTMCNC: 32.1 G/DL (ref 32–36)
MCV RBC AUTO: 81 FL (ref 82–98)
MONOCYTES # BLD AUTO: 0.4 K/UL (ref 0.3–1)
MONOCYTES NFR BLD: 10.2 % (ref 4–15)
MV MEAN GRADIENT: 2 MMHG
MV PEAK A VEL: 0.93 M/S
MV PEAK E VEL: 0.79 M/S
MV PEAK GRADIENT: 5 MMHG
MV STENOSIS PRESSURE HALF TIME: 44.28 MS
MV VALVE AREA BY CONTINUITY EQUATION: 3.34 CM2
MV VALVE AREA P 1/2 METHOD: 4.97 CM2
NEUTROPHILS # BLD AUTO: 2.2 K/UL (ref 1.8–7.7)
NEUTROPHILS NFR BLD: 56.7 % (ref 38–73)
NRBC BLD-RTO: 0 /100 WBC
OHS CV RV/LV RATIO: 0.75 CM
PISA TR MAX VEL: 2.13 M/S
PLATELET # BLD AUTO: 181 K/UL (ref 150–450)
PMV BLD AUTO: 9.2 FL (ref 9.2–12.9)
POCT GLUCOSE: 106 MG/DL (ref 70–110)
POCT GLUCOSE: 120 MG/DL (ref 70–110)
POCT GLUCOSE: 130 MG/DL (ref 70–110)
POCT GLUCOSE: 141 MG/DL (ref 70–110)
POTASSIUM SERPL-SCNC: 3.7 MMOL/L (ref 3.5–5.1)
PROT SERPL-MCNC: 5.2 G/DL (ref 6–8.4)
PV PEAK GRADIENT: 4 MMHG
PV PEAK VELOCITY: 1.01 M/S
RA MAJOR: 5.67 CM
RA PRESSURE ESTIMATED: 3 MMHG
RA WIDTH: 4.1 CM
RBC # BLD AUTO: 3.35 M/UL (ref 4.6–6.2)
RIGHT VENTRICULAR END-DIASTOLIC DIMENSION: 3.82 CM
RV TB RVSP: 5 MMHG
RV TISSUE DOPPLER FREE WALL SYSTOLIC VELOCITY 1 (APICAL 4 CHAMBER VIEW): 20.64 CM/S
SINUS: 3.51 CM
SODIUM SERPL-SCNC: 140 MMOL/L (ref 136–145)
STJ: 2.66 CM
TDI LATERAL: 0.12 M/S
TDI SEPTAL: 0.1 M/S
TDI: 0.11 M/S
TR MAX PG: 18 MMHG
TRICUSPID ANNULAR PLANE SYSTOLIC EXCURSION: 2.7 CM
TV REST PULMONARY ARTERY PRESSURE: 21 MMHG
VANCOMYCIN SERPL-MCNC: 18 UG/ML
WBC # BLD AUTO: 3.92 K/UL (ref 3.9–12.7)
Z-SCORE OF LEFT VENTRICULAR DIMENSION IN END DIASTOLE: -0.48
Z-SCORE OF LEFT VENTRICULAR DIMENSION IN END SYSTOLE: 0.57

## 2024-05-12 PROCEDURE — 25000003 PHARM REV CODE 250: Mod: HCNC | Performed by: HOSPITALIST

## 2024-05-12 PROCEDURE — 85025 COMPLETE CBC W/AUTO DIFF WBC: CPT | Mod: HCNC | Performed by: HOSPITALIST

## 2024-05-12 PROCEDURE — 63600175 PHARM REV CODE 636 W HCPCS: Mod: HCNC | Performed by: EMERGENCY MEDICINE

## 2024-05-12 PROCEDURE — 25000003 PHARM REV CODE 250: Mod: HCNC | Performed by: EMERGENCY MEDICINE

## 2024-05-12 PROCEDURE — 80202 ASSAY OF VANCOMYCIN: CPT | Mod: HCNC | Performed by: STUDENT IN AN ORGANIZED HEALTH CARE EDUCATION/TRAINING PROGRAM

## 2024-05-12 PROCEDURE — 80053 COMPREHEN METABOLIC PANEL: CPT | Mod: HCNC | Performed by: HOSPITALIST

## 2024-05-12 PROCEDURE — 11000001 HC ACUTE MED/SURG PRIVATE ROOM: Mod: HCNC

## 2024-05-12 RX ORDER — SODIUM CHLORIDE 9 MG/ML
INJECTION, SOLUTION INTRAVENOUS ONCE
Status: DISCONTINUED | OUTPATIENT
Start: 2024-05-12 | End: 2024-05-13

## 2024-05-12 RX ADMIN — ATORVASTATIN CALCIUM 80 MG: 40 TABLET, FILM COATED ORAL at 08:05

## 2024-05-12 RX ADMIN — POLYETHYLENE GLYCOL 3350 17 G: 17 POWDER, FOR SOLUTION ORAL at 08:05

## 2024-05-12 RX ADMIN — APIXABAN 5 MG: 5 TABLET, FILM COATED ORAL at 08:05

## 2024-05-12 RX ADMIN — OXYCODONE HYDROCHLORIDE AND ACETAMINOPHEN 1 TABLET: 5; 325 TABLET ORAL at 01:05

## 2024-05-12 RX ADMIN — MUPIROCIN: 20 OINTMENT TOPICAL at 09:05

## 2024-05-12 RX ADMIN — ASPIRIN 81 MG: 81 TABLET, COATED ORAL at 08:05

## 2024-05-12 RX ADMIN — SEVELAMER CARBONATE 800 MG: 800 TABLET, FILM COATED ORAL at 04:05

## 2024-05-12 RX ADMIN — Medication 1 CAPSULE: at 08:05

## 2024-05-12 RX ADMIN — MEROPENEM 500 MG: 500 INJECTION INTRAVENOUS at 09:05

## 2024-05-12 RX ADMIN — MUPIROCIN: 20 OINTMENT TOPICAL at 08:05

## 2024-05-12 RX ADMIN — SEVELAMER CARBONATE 800 MG: 800 TABLET, FILM COATED ORAL at 11:05

## 2024-05-12 RX ADMIN — BISACODYL 10 MG: 10 SUPPOSITORY RECTAL at 06:05

## 2024-05-12 RX ADMIN — LABETALOL HYDROCHLORIDE 100 MG: 100 TABLET, FILM COATED ORAL at 08:05

## 2024-05-12 RX ADMIN — FINASTERIDE 5 MG: 5 TABLET, FILM COATED ORAL at 08:05

## 2024-05-12 RX ADMIN — SEVELAMER CARBONATE 800 MG: 800 TABLET, FILM COATED ORAL at 08:05

## 2024-05-12 RX ADMIN — TAMSULOSIN HYDROCHLORIDE 0.8 MG: 0.4 CAPSULE ORAL at 08:05

## 2024-05-12 RX ADMIN — APIXABAN 5 MG: 5 TABLET, FILM COATED ORAL at 09:05

## 2024-05-12 NOTE — ASSESSMENT & PLAN NOTE
Patient with Paroxysmal (<7 days) atrial fibrillation which is controlled currently with Beta Blocker. Patient is currently in sinus rhythm.[id number] Score: 2. Anticoagulation indicated. Anticoagulation done with apixaban .

## 2024-05-12 NOTE — NURSING
PER handoff received from DARRION Marroquin     Pt resting in bed quietly. NAD noted.    Fall and safety precautions maintained. Bed alarm activated and audible.. Bed locked in lowest position, with side rails up x2. Call bell and personal items within reach

## 2024-05-12 NOTE — ASSESSMENT & PLAN NOTE
3/20/24 RLE arterial US Arterial vasculature of the right lower extremity is patent.  However, there is evidence of atherosclerotic change with stenosis at the level of popliteal artery.   - continue asa  - ok to resume statin now that LFTs are normal  - Vascular consulted to make sure he will be able to heal right BKA

## 2024-05-12 NOTE — NURSING
Attemped to administer suppository due to constipation per [facility] orders, and noticed large formed DM exiting rectum. Suppository not administered.

## 2024-05-12 NOTE — PROGRESS NOTES
[facility] Medicine  Progress Note    Patient Name: Miguel Moore  MRN: [id number]  Patient Class: IP- Inpatient   Admission Date: 5/10/2024  Length of Stay: 2 days  Attending Physician: Patricia Reddy MD  Primary Care Provider: Raciel Raymond MD        Subjective:     Principal Problem:Open wound of right foot        HPI:  68 y.o. male with hypertension, BPH, chronic right lower extremity DVT, DM2, dyslipidemia, ESRD on HD, hemiplegia and hemiparesis following cerebral infarction affecting the right dominant side, paroxysmal AFib, seizure disorder as sequela of CVA sent to the ED from wound care for new wound infection.  Denies fever, chills, cough, shortness breath, chest pain, dizziness, syncope.  Was hospitalized in March for cellulitis and abscess to the right foot with surgical intervention at that time.  In the ED today, CRP and procalcitonin elevated.  Blood and wound cultures were obtained.  IV antibiotics initiated.    Overview/Hospital Course:  Mr Miguel Moore was admitted with worsening wound to his right foot. Started vancomycin and meropenem given his history of ESBL organisms. He states he is not interested in further I&D on his right foot and is mentally preparing himself for amputation. Orthopaedics consulted. Vascular consulted to make sure that he will be able to heal BKA.     Interval History: He currently has no pain. He was constipated, now feeling better. He understands plan for vascular surgery consult tomorrow and further discussions regarding amputation from there.     Review of Systems   Constitutional:  Negative for chills and fever.   Respiratory:  Negative for shortness of breath.    Cardiovascular:  Negative for chest pain.   Gastrointestinal:  Negative for abdominal pain.   Skin:  Positive for wound.   Psychiatric/Behavioral:  Negative for confusion.      Objective:     Vital Signs (Most Recent):  Temp: 97.8 °F (36.6 °C) (05/12/24 0732)  Pulse: 83  (05/12/24 0732)  Resp: 20 (05/12/24 0732)  BP: (!) 173/70 (05/12/24 0732)  SpO2: (!) 93 % (05/12/24 0732) Vital Signs (24h Range):  Temp:  [97.7 °F (36.5 °C)-98.7 °F (37.1 °C)] 97.8 °F (36.6 °C)  Pulse:  [62-85] 83  Resp:  [17-20] 20  SpO2:  [93 %-100 %] 93 %  BP: (111-173)/(54-70) 173/70     Weight: 77 kg (169 lb 12.1 oz)  Body mass index is 25.81 kg/m².  No intake or output data in the 24 hours ending 05/12/24 1112        Physical Exam  Vitals and nursing note reviewed.   Constitutional:       General: He is not in acute distress.     Appearance: He is not ill-appearing or toxic-appearing.   HENT:      Head: Normocephalic and atraumatic.   Cardiovascular:      Rate and Rhythm: Normal rate and regular rhythm.   Pulmonary:      Effort: Pulmonary effort is normal.      Breath sounds: Normal breath sounds.      Comments: Room air  Abdominal:      General: Bowel sounds are normal.      Palpations: Abdomen is soft.      Tenderness: There is no abdominal tenderness.   Musculoskeletal:      Right lower leg: No edema.      Left lower leg: No edema.   Skin:     Comments: LUE AVF with thrill. Feet are currently dressed   Neurological:      Mental Status: He is alert. Mental status is at baseline.             Significant Labs: All pertinent labs within the past 24 hours have been reviewed.    Significant Imaging: I have reviewed all pertinent imaging results/findings within the past 24 hours.    Assessment/Plan:      * Open wound of right foot  Worsening R foot wounds. Home health nursing noted purulence. No pain.   - blood cultures with Staph epi- likely contaminant  - continue vanc, meropenem given history of ESBL organisms  - he does not want further surgeries on his foot and is preparing himself mentally for amputation. Orthopaedics ([facility]) consulted- Vascular consulted to make sure he can heal R BKA site.   - his L foot also has a wound- XR no osteomyelitis, wound care consulted     Peripheral artery  disease  3/20/24 RLE arterial US Arterial vasculature of the right lower extremity is patent.  However, there is evidence of atherosclerotic change with stenosis at the level of popliteal artery.   - continue asa  - ok to resume statin now that LFTs are normal  - Vascular consulted to make sure he will be able to heal right BKA      Paroxysmal atrial fibrillation  Patient with Paroxysmal (<7 days) atrial fibrillation which is controlled currently with Beta Blocker. Patient is currently in sinus rhythm.[id number] Score: 2. Anticoagulation indicated. Anticoagulation done with apixaban .    Anemia in chronic kidney disease  Patient's anemia is currently controlled. Has not received any PRBCs to date. Etiology likely d/t chronic disease due to ESRD  Current CBC reviewed-   Lab Results   Component Value Date    HGB 8.7 (L) 05/12/2024    HCT 27.1 (L) 05/12/2024     Monitor serial CBC and transfuse if patient becomes hemodynamically unstable, symptomatic or H/H drops below 7/21.    Type 2 diabetes mellitus with diabetic neuropathy, with long-term current use of insulin  Patient's FSGs are controlled on current medication regimen.  Last A1c reviewed-   Lab Results   Component Value Date    HGBA1C 5.8 (H) 03/20/2024     Most recent fingerstick glucose reviewed-   Recent Labs   Lab 05/11/24  1150 05/12/24  0734   POCTGLUCOSE 109 130*       Current correctional scale  Medium  Maintain anti-hyperglycemic dose as follows-   Antihyperglycemics (From admission, onward)      Start     Stop Route Frequency Ordered    05/10/24 1858  insulin aspart U-100 pen 0-10 Units         -- SubQ Before meals & nightly PRN 05/10/24 1758            Secondary hyperparathyroidism of renal origin  Continue sevelamer      ESRD (end stage renal disease)  Receives HD MWF via LUE AVF. Has not missed any sessions.   - Nephrology consulted  - appears euvolemic, no indication for acute dialysis today    Hemiplegia and hemiparesis following cerebral  infarction affecting right dominant side  At baseline  Continue eliquis/asa, BP Rx  LFTs normalized- can resume statin now     BPH (benign prostatic hyperplasia) 2/18/21 prostate bx normal  Continue home regimen of tamsulosin and finasteride    Dyslipidemia  Not currently on statin due to previously elevated LFTs. LFTs now normal- can resume statin     Benign essential HTN  Chronic, controlled. Latest blood pressure and vitals reviewed-     Temp:  [97.7 °F (36.5 °C)-98.7 °F (37.1 °C)]   Pulse:  [62-85]   Resp:  [17-20]   BP: (111-173)/(54-70)   SpO2:  [93 %-100 %] .   Home meds for hypertension were reviewed and noted below.   Hypertension Medications               labetaloL (NORMODYNE) 100 MG tablet Take 1 tablet (100 mg total) by mouth once daily.            While in the hospital, will manage blood pressure as follows; Continue home antihypertensive regimen    Will utilize p.r.n. blood pressure medication only if patient's blood pressure greater than 180/110 and he develops symptoms such as worsening chest pain or shortness of breath.      VTE Risk Mitigation (From admission, onward)           Ordered     apixaban tablet 5 mg  2 times daily         05/10/24 1758     IP VTE HIGH RISK PATIENT  Once         05/10/24 1758     Place sequential compression device  Until discontinued         05/10/24 1758     Reason for No Pharmacological VTE Prophylaxis  Once        Question:  Reasons:  Answer:  Already adequately anticoagulated on oral Anticoagulants    05/10/24 1758                    Discharge Planning   GARY:      Code Status: Full Code   Is the patient medically ready for discharge?:     Reason for patient still in hospital (select all that apply): Patient trending condition and Consult recommendations                     Patricia Reddy MD  Department of Hospital Medicine   [facility] - Observation

## 2024-05-12 NOTE — NURSING
[facility], [facility]  Nurses Note -- 4 Eyes      5/11/2024       Skin assessed on: Q Shift      [] No Pressure Injuries Present    [x]Prevention Measures Documented    [x] Yes LDA  for Pressure Injury Previously documented     [] Yes New Pressure Injury Discovered   [] LDA for New Pressure Injury Added      Attending RN:  Lopez Whitehead RN     Second RN: JIM Santos

## 2024-05-12 NOTE — PLAN OF CARE
Problem: Diabetes Comorbidity  Goal: Blood Glucose Level Within Targeted Range  Intervention: Monitor and Manage Glycemia  Flowsheets (Taken 5/12/2024 0248)  Glycemic Management: blood glucose monitored     Problem: Wound  Goal: Optimal Wound Healing  Intervention: Promote Wound Healing  Flowsheets (Taken 5/12/2024 0248)  Sleep/Rest Enhancement:   regular sleep/rest pattern promoted   room darkened   awakenings minimized     Problem: Skin Injury Risk Increased  Goal: Skin Health and Integrity  Intervention: Optimize Skin Protection  Flowsheets (Taken 5/12/2024 0248)  Pressure Reduction Techniques:   weight shift assistance provided   heels elevated off bed  Pressure Reduction Devices:   pressure-redistributing mattress utilized   positioning supports utilized   heel offloading device utilized   foam padding utilized  Skin Protection: incontinence pads utilized  Activity Management:   Arm raise - L1   Rolling - L1  Head of Bed (HOB) Positioning: HOB elevated

## 2024-05-12 NOTE — ASSESSMENT & PLAN NOTE
Chronic, controlled. Latest blood pressure and vitals reviewed-     Temp:  [97.7 °F (36.5 °C)-98.7 °F (37.1 °C)]   Pulse:  [62-85]   Resp:  [17-20]   BP: (111-173)/(54-70)   SpO2:  [93 %-100 %] .   Home meds for hypertension were reviewed and noted below.   Hypertension Medications               labetaloL (NORMODYNE) 100 MG tablet Take 1 tablet (100 mg total) by mouth once daily.            While in the hospital, will manage blood pressure as follows; Continue home antihypertensive regimen    Will utilize p.r.n. blood pressure medication only if patient's blood pressure greater than 180/110 and he develops symptoms such as worsening chest pain or shortness of breath.

## 2024-05-12 NOTE — NURSING
Received handoff report from morning shift. Patient lying on bed AAOx4, no acute distress noted, breathing even and unlabored. Safety precautions maintained. Care assumed at this time.

## 2024-05-12 NOTE — PROGRESS NOTES
Date of Admission:5/10/2024    SUBJECTIVE:notes feeling not great      Current Facility-Administered Medications   Medication    0.9%  NaCl infusion    acetaminophen tablet 650 mg    albuterol-ipratropium 2.5 mg-0.5 mg/3 mL nebulizer solution 3 mL    aluminum-magnesium hydroxide-simethicone 200-200-20 mg/5 mL suspension 30 mL    apixaban tablet 5 mg    aspirin EC tablet 81 mg    atorvastatin tablet 80 mg    bisacodyL suppository 10 mg    dextrose 10% bolus 125 mL 125 mL    dextrose 10% bolus 250 mL 250 mL    [START ON 5/13/2024] epoetin marisol-epbx injection 10,000 Units    finasteride tablet 5 mg    glucagon (human recombinant) injection 1 mg    glucose chewable tablet 16 g    glucose chewable tablet 24 g    insulin aspart U-100 pen 0-10 Units    labetaloL tablet 100 mg    melatonin tablet 6 mg    meropenem (MERREM) 500 mg in sodium chloride 0.9 % 100 mL IVPB (MB+)    mupirocin 2 % ointment    naloxone 0.4 mg/mL injection 0.02 mg    ondansetron injection 4 mg    oxyCODONE-acetaminophen 5-325 mg per tablet 1 tablet    polyethylene glycol packet 17 g    prochlorperazine injection Soln 5 mg    sevelamer carbonate tablet 800 mg    simethicone chewable tablet 80 mg    sodium chloride 0.9% bolus 250 mL 250 mL    sodium chloride 0.9% flush 10 mL    tamsulosin 24 hr capsule 0.8 mg    vancomycin - pharmacy to dose    vitamin renal formula (B-complex-vitamin c-folic acid) 1 mg per capsule 1 capsule       Wt Readings from Last 3 Encounters:   05/10/24 77 kg (169 lb 12.1 oz)   04/02/24 90.4 kg (199 lb 4.7 oz)   03/07/24 79.8 kg (175 lb 14.8 oz)     Temp Readings from Last 3 Encounters:   05/12/24 98.5 °F (36.9 °C)   04/19/24 98.3 °F (36.8 °C) (Oral)   02/06/24 98 °F (36.7 °C) (Oral)     BP Readings from Last 3 Encounters:   05/12/24 (!) 169/74   04/19/24 (!) 138/53   03/07/24 129/65     Pulse Readings from Last 3 Encounters:   05/12/24 69   04/19/24 79   02/06/24 80       Intake/Output Summary (Last 24 hours) at 5/12/2024  1435  Last data filed at 5/12/2024 1235  Gross per 24 hour   Intake 360 ml   Output --   Net 360 ml       PE:  GEN:wd male in nad  HEENT:ncat,eomi,mm  CVS:cristiane 2/6  PULM:ctab  ABD:bs,soft  EXT:no leg edema  NEURO:awake  Avf arm  Dressed foot  Recent Labs   Lab 05/12/24  0520   GLU 92      K 3.7      CO2 26   BUN 22   CREATININE 6.0*   CALCIUM 8.1*       Lab Results   Component Value Date     (H) 04/22/2024    CALCIUM 8.1 (L) 05/12/2024    PHOS 3.5 03/29/2024       Recent Labs   Lab 05/12/24  0520   WBC 3.92   RBC 3.35*   HGB 8.7*   HCT 27.1*      MCV 81*   MCH 26.0*   MCHC 32.1           A/P:  1.esrd. resume MWF. Hd in am.  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 Add epo.  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  Phos maxine in am.  4.htn. cont bp meds.  5.dm foot wound. Bka pending.  Cont vanc /tara.  6.dm 2.following sugars.

## 2024-05-12 NOTE — PLAN OF CARE
Problem: Adult Inpatient Plan of Care  Goal: Plan of Care Review  Outcome: Progressing  Goal: Patient-Specific Goal (Individualized)  Outcome: Progressing  Goal: Absence of Hospital-Acquired Illness or Injury  Outcome: Progressing  Intervention: Identify and Manage Fall Risk  Flowsheets (Taken 5/12/2024 1723)  Safety Promotion/Fall Prevention:   assistive device/personal item within reach   bed alarm set   commode/urinal/bedpan at bedside   Fall Risk signage in place   medications reviewed   nonskid shoes/socks when out of bed   room near unit station   side rails raised x 3   instructed to call staff for mobility  Intervention: Prevent Skin Injury  Flowsheets (Taken 5/12/2024 1723)  Body Position: weight shifting  Skin Protection:   incontinence pads utilized   protective footwear used   silicone foam dressing in place   transparent dressing maintained  Device Skin Pressure Protection:   absorbent pad utilized/changed   adhesive use limited   positioning supports utilized   pressure points protected   tubing/devices free from skin contact  Intervention: Prevent and Manage VTE (Venous Thromboembolism) Risk  Flowsheets (Taken 5/12/2024 1723)  VTE Prevention/Management:   bleeding precations maintained   bleeding risk assessed   bleeding risk factor(s) identified, provider notified   ROM (passive) performed  Intervention: Prevent Infection  Flowsheets (Taken 5/12/2024 1723)  Infection Prevention: single patient room provided  Goal: Optimal Comfort and Wellbeing  Outcome: Progressing  Intervention: Monitor Pain and Promote Comfort  Flowsheets (Taken 5/12/2024 1723)  Pain Management Interventions:   care clustered   medication offered  Intervention: Provide Person-Centered Care  Flowsheets (Taken 5/12/2024 1723)  Trust Relationship/Rapport:   care explained   thoughts/feelings acknowledged  Goal: Readiness for Transition of Care  Outcome: Progressing     Problem: Infection  Goal: Absence of Infection Signs and  Symptoms  Outcome: Progressing  Intervention: Prevent or Manage Infection  Flowsheets (Taken 5/12/2024 1723)  Infection Management: aseptic technique maintained     Problem: Diabetes Comorbidity  Goal: Blood Glucose Level Within Targeted Range  Outcome: Progressing  Intervention: Monitor and Manage Glycemia  Flowsheets (Taken 5/12/2024 1723)  Glycemic Management:   blood glucose monitored   supplemental insulin given     Problem: Wound  Goal: Optimal Coping  Outcome: Progressing  Goal: Optimal Functional Ability  Outcome: Progressing  Intervention: Optimize Functional Ability  Flowsheets (Taken 5/12/2024 1723)  Activity Management: Rolling - L1  Activity Assistance Provided: assistance, 2 people  Goal: Absence of Infection Signs and Symptoms  Outcome: Progressing  Intervention: Prevent or Manage Infection  Flowsheets (Taken 5/12/2024 1723)  Infection Management: aseptic technique maintained  Goal: Improved Oral Intake  Outcome: Progressing  Goal: Optimal Pain Control and Function  Outcome: Progressing  Intervention: Prevent or Manage Pain  Flowsheets (Taken 5/12/2024 1723)  Sleep/Rest Enhancement: relaxation techniques promoted  Pain Management Interventions:   care clustered   medication offered  Goal: Skin Health and Integrity  Outcome: Progressing  Intervention: Optimize Skin Protection  Flowsheets (Taken 5/12/2024 1723)  Pressure Reduction Techniques:   heels elevated off bed   positioned off wounds   pressure points protected   rest period provided between sit times   weight shift assistance provided  Pressure Reduction Devices:   foam padding utilized   heel offloading device utilized   positioning supports utilized   pressure-redistributing mattress utilized  Skin Protection:   incontinence pads utilized   protective footwear used   silicone foam dressing in place   transparent dressing maintained  Activity Management: Rolling - L1  Head of Bed (HOB) Positioning: HOB elevated  Goal: Optimal Wound  Healing  Outcome: Progressing  Intervention: Promote Wound Healing  Flowsheets (Taken 5/12/2024 1723)  Sleep/Rest Enhancement: relaxation techniques promoted     Problem: Skin Injury Risk Increased  Goal: Skin Health and Integrity  Outcome: Progressing  Intervention: Optimize Skin Protection  Flowsheets (Taken 5/12/2024 1723)  Pressure Reduction Techniques:   heels elevated off bed   positioned off wounds   pressure points protected   rest period provided between sit times   weight shift assistance provided  Pressure Reduction Devices:   foam padding utilized   heel offloading device utilized   positioning supports utilized   pressure-redistributing mattress utilized  Skin Protection:   incontinence pads utilized   protective footwear used   silicone foam dressing in place   transparent dressing maintained  Activity Management: Rolling - L1  Head of Bed (HOB) Positioning: HOB elevated     Problem: Hemodialysis  Goal: Safe, Effective Therapy Delivery  Outcome: Progressing  Intervention: Optimize Device Care and Function  Flowsheets (Taken 5/12/2024 1723)  Medication Review/Management: medications reviewed  Goal: Effective Tissue Perfusion  Outcome: Progressing  Goal: Absence of Infection Signs and Symptoms  Outcome: Progressing  Intervention: Prevent or Manage Infection  Flowsheets (Taken 5/12/2024 1723)  Infection Prevention: single patient room provided  Infection Management: aseptic technique maintained      Negative Screen

## 2024-05-12 NOTE — SUBJECTIVE & OBJECTIVE
Interval History: He currently has no pain. He was constipated, now feeling better. He understands plan for vascular surgery consult tomorrow and further discussions regarding amputation from there.     Review of Systems   Constitutional:  Negative for chills and fever.   Respiratory:  Negative for shortness of breath.    Cardiovascular:  Negative for chest pain.   Gastrointestinal:  Negative for abdominal pain.   Skin:  Positive for wound.   Psychiatric/Behavioral:  Negative for confusion.      Objective:     Vital Signs (Most Recent):  Temp: 97.8 °F (36.6 °C) (05/12/24 0732)  Pulse: 83 (05/12/24 0732)  Resp: 20 (05/12/24 0732)  BP: (!) 173/70 (05/12/24 0732)  SpO2: (!) 93 % (05/12/24 0732) Vital Signs (24h Range):  Temp:  [97.7 °F (36.5 °C)-98.7 °F (37.1 °C)] 97.8 °F (36.6 °C)  Pulse:  [62-85] 83  Resp:  [17-20] 20  SpO2:  [93 %-100 %] 93 %  BP: (111-173)/(54-70) 173/70     Weight: 77 kg (169 lb 12.1 oz)  Body mass index is 25.81 kg/m².  No intake or output data in the 24 hours ending 05/12/24 1112        Physical Exam  Vitals and nursing note reviewed.   Constitutional:       General: He is not in acute distress.     Appearance: He is not ill-appearing or toxic-appearing.   HENT:      Head: Normocephalic and atraumatic.   Cardiovascular:      Rate and Rhythm: Normal rate and regular rhythm.   Pulmonary:      Effort: Pulmonary effort is normal.      Breath sounds: Normal breath sounds.      Comments: Room air  Abdominal:      General: Bowel sounds are normal.      Palpations: Abdomen is soft.      Tenderness: There is no abdominal tenderness.   Musculoskeletal:      Right lower leg: No edema.      Left lower leg: No edema.   Skin:     Comments: LUE AVF with thrill. Feet are currently dressed   Neurological:      Mental Status: He is alert. Mental status is at baseline.             Significant Labs: All pertinent labs within the past 24 hours have been reviewed.    Significant Imaging: I have reviewed all pertinent  imaging results/findings within the past 24 hours.

## 2024-05-12 NOTE — PLAN OF CARE
Case Management Assessment     PCP: Raciel Raymond  Pharmacy: [facility]    Patient Arrived From: home   Existing Help at Home: daughter    Barriers to Discharge: none    Discharge Plan:    A. Home with family   B. Home with family    SW completed initial assessment and discussed discharge planning with patient at his bedside. Patient lives with his daughter. Patient receives dialysis at [facility] on MWF. Patient is current with [facility] and would like to resume upon discharge.Patient will need ambulance transportation set-up for him to get home when discharge from the hospital.      05/12/24 2328   Discharge Assessment   Assessment Type Discharge Planning Assessment   Confirmed/corrected address, phone number and insurance Yes   Confirmed Demographics Correct on Facesheet   Source of Information patient   Communicated GARY with patient/caregiver Date not available/Unable to determine   Reason For Admission Open wound of right foot   People in Home child(hannah), adult   Do you expect to return to your current living situation? Yes   Do you have help at home or someone to help you manage your care at home? Yes   Who are your caregiver(s) and their phone number(s)? Maddy Valdes (Daughter)  [phone] (Mobile)   Prior to hospitilization cognitive status: Alert/Oriented   Current cognitive status: Alert/Oriented   Walking or Climbing Stairs Difficulty yes   Walking or Climbing Stairs stair climbing difficulty, requires equipment   Mobility Management Wheelchair, Cane   Dressing/Bathing Difficulty no   Equipment Currently Used at Home none   Readmission within 30 days? Yes   Patient currently being followed by outpatient case management? No   Do you currently have service(s) that help you manage your care at home? No   Do you take prescription medications? Yes   Do you have prescription coverage? Yes   Coverage [facility]   Do you have any problems affording any of your prescribed medications? No   Is  the patient taking medications as prescribed? yes   Who is going to help you get home at discharge? Maddy Valdes (Daughter)  [phone] (Mobile)   How do you get to doctors appointments? family or friend will provide   Are you on dialysis? Yes   Dialysis Name and Scheduled days [facility]   Do you take coumadin? No   Discharge Plan A Home with family   Discharge Plan B Home with family   DME Needed Upon Discharge  wheelchair;sharon louise   Discharge Plan discussed with: Patient   Transition of Care Barriers None   OTHER   Name(s) of People in Home Maddy Valdes (Daughter)  [phone] (Mobile)

## 2024-05-12 NOTE — PROGRESS NOTES
Pharmacokinetic Assessment Follow Up: IV Vancomycin    Vancomycin serum concentration assessment(s):    The random level was drawn correctly and can be used to guide therapy at this time. The measurement is within the desired definitive target range of 10 to 20 mcg/mL.    Vancomycin Regimen Plan:    Vancomycin 500mg to be administered post HD if patient has HD.    Re-dose when the random level is less than 20 mcg/mL, next level to be drawn at 0400 on 5/13/24    Drug levels (last 3 results):  Recent Labs   Lab Result Units 05/10/24  1546 05/11/24  0457 05/12/24  0520   Vancomycin, Random ug/mL 20.4 18.7 18.0       Pharmacy will continue to follow and monitor vancomycin.    Please contact pharmacy at extension [phone] for questions regarding this assessment.    Thank you for the consult,   Hipolito Sandy       Patient brief summary:  Miguel Moore is a 68 y.o. male initiated on antimicrobial therapy with IV Vancomycin for treatment of skin & soft tissue infection    The patient's current regimen is random pulse dosing    Drug Allergies:   Review of patient's allergies indicates:   Allergen Reactions    Ace inhibitors      Hyperkalemia 8/2018  Other reaction(s): Unknown    Penicillins Hives     Tolerated cefepime and cefdinir previously    Tizanidine      Felt hot       Actual Body Weight:   77 kg    Renal Function:   Estimated Creatinine Clearance: 11.2 mL/min (A) (based on SCr of 6 mg/dL (H)).,     Dialysis Method (if applicable):  intermittent HD    CBC (last 72 hours):  Recent Labs   Lab Result Units 05/10/24  1546 05/11/24  0457 05/12/24  0520   WBC K/uL 4.63 5.40 3.92   Hemoglobin g/dL 9.7* 9.7* 8.7*   Hematocrit % 30.2* 31.6* 27.1*   Platelets K/uL 186 179 181   Gran % % 69.9 68.9 56.7   Lymph % % 18.1 20.4 27.0   Mono % % 8.4 7.8 10.2   Eosinophil % % 3.0 2.0 4.8   Basophil % % 0.4 0.7 0.8   Differential Method  Automated Automated Automated       Metabolic Panel (last 72 hours):  Recent Labs   Lab Result  Units 05/10/24  1546 05/11/24  0457 05/12/24  0520   Sodium mmol/L 139 139 140   Potassium mmol/L 3.6 3.6 3.7   Chloride mmol/L 100 102 102   CO2 mmol/L 29 27 26   Glucose mg/dL 97 107 92   BUN mg/dL 11 16 22   Creatinine mg/dL 3.7* 4.5* 6.0*   Albumin g/dL 2.0* 1.9* 1.7*   Total Bilirubin mg/dL 0.4 0.3 0.3   Alkaline Phosphatase U/L 76 73 63   AST U/L 13 11 10   ALT U/L <5* <5* <5*       Vancomycin Administrations:  vancomycin given in the last 96 hours        No antibiotic orders with administrations found.                    Microbiologic Results:  Microbiology Results (last 7 days)       Procedure Component Value Units Date/Time    Aerobic culture (Specify Source) **CANNOT BE ORDERED AS STAT** [[id number]] Collected: 05/10/24 1703    Order Status: Completed Specimen: Abscess from Foot, Right Updated: 05/12/24 0619     Aerobic Bacterial Culture No growth    Blood culture [[id number]] Collected: 05/11/24 2006    Order Status: Completed Specimen: Blood from Peripheral, Hand, Right Updated: 05/12/24 0512     Blood Culture, Routine No Growth to date    Narrative:      could not draw second set cultures, due to patient being a hard   stick. reported to nurse Gwen    Blood culture [[id number]] Collected: 05/11/24 1335    Order Status: Completed Specimen: Blood Updated: 05/11/24 2312     Blood Culture, Routine No Growth to date    Blood culture #2 [[id number]] Collected: 05/10/24 1547    Order Status: Completed Specimen: Blood from Peripheral, Hand, Right Updated: 05/11/24 1455     Blood Culture, Routine Gram stain aer bottle: Gram positive cocci in clusters resembling Staph      Positive results previously called    Narrative:      Blood Culture #2    Blood culture #1 [[id number]] Collected: 05/10/24 1545    Order Status: Completed Specimen: Blood from Peripheral, Antecubital, Right Updated: 05/11/24 0956     Blood Culture, Routine Gram stain aer bottle: Gram positive cocci in clusters resembling Staph      Results  called to and read back by: Gwen Staley 05/11/2024  09:56    Narrative:      Blood Culture #1

## 2024-05-12 NOTE — ASSESSMENT & PLAN NOTE
Patient's FSGs are controlled on current medication regimen.  Last A1c reviewed-   Lab Results   Component Value Date    HGBA1C 5.8 (H) 03/20/2024     Most recent fingerstick glucose reviewed-   Recent Labs   Lab 05/11/24  1150 05/12/24  0734   POCTGLUCOSE 109 130*       Current correctional scale  Medium  Maintain anti-hyperglycemic dose as follows-   Antihyperglycemics (From admission, onward)    Start     Stop Route Frequency Ordered    05/10/24 1858  insulin aspart U-100 pen 0-10 Units         -- SubQ Before meals & nightly PRN 05/10/24 0267

## 2024-05-12 NOTE — ASSESSMENT & PLAN NOTE
Worsening R foot wounds. Home health nursing noted purulence. No pain.   - blood cultures with Staph epi- likely contaminant  - continue vanc, meropenem given history of ESBL organisms  - he does not want further surgeries on his foot and is preparing himself mentally for amputation. Orthopaedics ([facility]) consulted- Vascular consulted to make sure he can heal R BKA site.   - his L foot also has a wound- XR no osteomyelitis, wound care consulted

## 2024-05-12 NOTE — PLAN OF CARE
Problem: Adult Inpatient Plan of Care  Goal: Absence of Hospital-Acquired Illness or Injury  Intervention: Prevent Skin Injury  Flowsheets (Taken 5/11/2024 1931)  Body Position:   turned   right   left   heels elevated   log-rolled  Skin Protection:   incontinence pads utilized   silicone foam dressing in place  Device Skin Pressure Protection:   absorbent pad utilized/changed   adhesive use limited     Problem: Diabetes Comorbidity  Goal: Blood Glucose Level Within Targeted Range  Intervention: Monitor and Manage Glycemia  Flowsheets (Taken 5/11/2024 1931)  Glycemic Management: blood glucose monitored     Problem: Skin Injury Risk Increased  Goal: Skin Health and Integrity  Intervention: Optimize Skin Protection  Flowsheets (Taken 5/11/2024 1931)  Pressure Reduction Techniques: frequent weight shift encouraged  Skin Protection:   incontinence pads utilized   silicone foam dressing in place  Activity Management:   Arm raise - L1   Heel slide - L1   Rolling - L1  Head of Bed (HOB) Positioning: HOB elevated      Patient

## 2024-05-12 NOTE — ASSESSMENT & PLAN NOTE
Patient's anemia is currently controlled. Has not received any PRBCs to date. Etiology likely d/t chronic disease due to ESRD  Current CBC reviewed-   Lab Results   Component Value Date    HGB 8.7 (L) 05/12/2024    HCT 27.1 (L) 05/12/2024     Monitor serial CBC and transfuse if patient becomes hemodynamically unstable, symptomatic or H/H drops below 7/21.

## 2024-05-12 NOTE — NURSING
[facility] [place]  Nurses Note -- 4 Eyes      5/12/2024       Skin assessed on: Q Shift      [] No Pressure Injuries Present    []Prevention Measures Documented    [x] Yes LDA  for Pressure Injury Previously documented     [] Yes New Pressure Injury Discovered   [] LDA for New Pressure Injury Added      Attending RN:  Zeynep Tracy RN     Second RN:  DARRION Marroquin

## 2024-05-12 NOTE — NURSING
Wrapped/cleansed right ankle/foot after assessment by Dr. Montilla. Cleaned with Vashe and talfa applied to protect and wrapped with Kurlex. Patient tolerated well. Foot placed back into green heel boot, pt turned with wedge utilized.

## 2024-05-13 ENCOUNTER — TELEPHONE (OUTPATIENT)
Dept: HEMATOLOGY/ONCOLOGY | Facility: CLINIC | Age: 69
End: 2024-05-13
Payer: MEDICARE

## 2024-05-13 PROBLEM — L03.115 CELLULITIS OF RIGHT FOOT: Status: ACTIVE | Noted: 2024-05-13

## 2024-05-13 PROBLEM — I70.239 ATHEROSCLEROSIS OF NATIVE ARTERY OF RIGHT LOWER EXTREMITY WITH ULCERATION: Status: ACTIVE | Noted: 2024-05-13

## 2024-05-13 LAB
ALBUMIN SERPL BCP-MCNC: 1.8 G/DL (ref 3.5–5.2)
ALP SERPL-CCNC: 62 U/L (ref 55–135)
ALT SERPL W/O P-5'-P-CCNC: <5 U/L (ref 10–44)
ANION GAP SERPL CALC-SCNC: 14 MMOL/L (ref 8–16)
APTT PPP: 37.2 SEC (ref 21–32)
AST SERPL-CCNC: 9 U/L (ref 10–40)
BACTERIA BLD CULT: ABNORMAL
BASOPHILS # BLD AUTO: 0.03 K/UL (ref 0–0.2)
BASOPHILS # BLD AUTO: 0.04 K/UL (ref 0–0.2)
BASOPHILS NFR BLD: 0.7 % (ref 0–1.9)
BASOPHILS NFR BLD: 0.8 % (ref 0–1.9)
BILIRUB SERPL-MCNC: 0.3 MG/DL (ref 0.1–1)
BUN SERPL-MCNC: 27 MG/DL (ref 8–23)
CALCIUM SERPL-MCNC: 8.2 MG/DL (ref 8.7–10.5)
CHLORIDE SERPL-SCNC: 101 MMOL/L (ref 95–110)
CO2 SERPL-SCNC: 23 MMOL/L (ref 23–29)
CREAT SERPL-MCNC: 7.5 MG/DL (ref 0.5–1.4)
DIFFERENTIAL METHOD BLD: ABNORMAL
DIFFERENTIAL METHOD BLD: ABNORMAL
EOSINOPHIL # BLD AUTO: 0.2 K/UL (ref 0–0.5)
EOSINOPHIL # BLD AUTO: 0.2 K/UL (ref 0–0.5)
EOSINOPHIL NFR BLD: 3.6 % (ref 0–8)
EOSINOPHIL NFR BLD: 5.8 % (ref 0–8)
ERYTHROCYTE [DISTWIDTH] IN BLOOD BY AUTOMATED COUNT: 17.1 % (ref 11.5–14.5)
ERYTHROCYTE [DISTWIDTH] IN BLOOD BY AUTOMATED COUNT: 17.1 % (ref 11.5–14.5)
EST. GFR  (NO RACE VARIABLE): 7 ML/MIN/1.73 M^2
GLUCOSE SERPL-MCNC: 112 MG/DL (ref 70–110)
HCT VFR BLD AUTO: 25.9 % (ref 40–54)
HCT VFR BLD AUTO: 28.9 % (ref 40–54)
HGB BLD-MCNC: 8.3 G/DL (ref 14–18)
HGB BLD-MCNC: 9.3 G/DL (ref 14–18)
IMM GRANULOCYTES # BLD AUTO: 0.01 K/UL (ref 0–0.04)
IMM GRANULOCYTES # BLD AUTO: 0.02 K/UL (ref 0–0.04)
IMM GRANULOCYTES NFR BLD AUTO: 0.3 % (ref 0–0.5)
IMM GRANULOCYTES NFR BLD AUTO: 0.3 % (ref 0–0.5)
INR PPP: 1.1 (ref 0.8–1.2)
LYMPHOCYTES # BLD AUTO: 1 K/UL (ref 1–4.8)
LYMPHOCYTES # BLD AUTO: 1 K/UL (ref 1–4.8)
LYMPHOCYTES NFR BLD: 16.8 % (ref 18–48)
LYMPHOCYTES NFR BLD: 24 % (ref 18–48)
MCH RBC QN AUTO: 25.9 PG (ref 27–31)
MCH RBC QN AUTO: 25.9 PG (ref 27–31)
MCHC RBC AUTO-ENTMCNC: 32 G/DL (ref 32–36)
MCHC RBC AUTO-ENTMCNC: 32.2 G/DL (ref 32–36)
MCV RBC AUTO: 81 FL (ref 82–98)
MCV RBC AUTO: 81 FL (ref 82–98)
MONOCYTES # BLD AUTO: 0.3 K/UL (ref 0.3–1)
MONOCYTES # BLD AUTO: 0.5 K/UL (ref 0.3–1)
MONOCYTES NFR BLD: 8.6 % (ref 4–15)
MONOCYTES NFR BLD: 8.7 % (ref 4–15)
NEUTROPHILS # BLD AUTO: 2.4 K/UL (ref 1.8–7.7)
NEUTROPHILS # BLD AUTO: 4.2 K/UL (ref 1.8–7.7)
NEUTROPHILS NFR BLD: 60.5 % (ref 38–73)
NEUTROPHILS NFR BLD: 69.9 % (ref 38–73)
NRBC BLD-RTO: 0 /100 WBC
NRBC BLD-RTO: 0 /100 WBC
PHOSPHATE SERPL-MCNC: 5.4 MG/DL (ref 2.7–4.5)
PLATELET # BLD AUTO: 181 K/UL (ref 150–450)
PLATELET # BLD AUTO: 195 K/UL (ref 150–450)
PMV BLD AUTO: 8.8 FL (ref 9.2–12.9)
PMV BLD AUTO: 9.1 FL (ref 9.2–12.9)
POCT GLUCOSE: 113 MG/DL (ref 70–110)
POCT GLUCOSE: 128 MG/DL (ref 70–110)
POCT GLUCOSE: 136 MG/DL (ref 70–110)
POTASSIUM SERPL-SCNC: 3.8 MMOL/L (ref 3.5–5.1)
PROT SERPL-MCNC: 5.2 G/DL (ref 6–8.4)
PROTHROMBIN TIME: 12.2 SEC (ref 9–12.5)
RBC # BLD AUTO: 3.21 M/UL (ref 4.6–6.2)
RBC # BLD AUTO: 3.59 M/UL (ref 4.6–6.2)
SODIUM SERPL-SCNC: 138 MMOL/L (ref 136–145)
VANCOMYCIN SERPL-MCNC: 17.3 UG/ML
WBC # BLD AUTO: 3.96 K/UL (ref 3.9–12.7)
WBC # BLD AUTO: 6.06 K/UL (ref 3.9–12.7)

## 2024-05-13 PROCEDURE — 99900035 HC TECH TIME PER 15 MIN (STAT): Mod: HCNC

## 2024-05-13 PROCEDURE — 63600175 PHARM REV CODE 636 W HCPCS: Mod: HCNC | Performed by: EMERGENCY MEDICINE

## 2024-05-13 PROCEDURE — 25000003 PHARM REV CODE 250: Mod: HCNC | Performed by: STUDENT IN AN ORGANIZED HEALTH CARE EDUCATION/TRAINING PROGRAM

## 2024-05-13 PROCEDURE — 85025 COMPLETE CBC W/AUTO DIFF WBC: CPT | Mod: 91,HCNC | Performed by: STUDENT IN AN ORGANIZED HEALTH CARE EDUCATION/TRAINING PROGRAM

## 2024-05-13 PROCEDURE — 36415 COLL VENOUS BLD VENIPUNCTURE: CPT | Mod: HCNC | Performed by: STUDENT IN AN ORGANIZED HEALTH CARE EDUCATION/TRAINING PROGRAM

## 2024-05-13 PROCEDURE — 25000003 PHARM REV CODE 250: Mod: HCNC | Performed by: HOSPITALIST

## 2024-05-13 PROCEDURE — 63600175 PHARM REV CODE 636 W HCPCS: Mod: HCNC | Performed by: STUDENT IN AN ORGANIZED HEALTH CARE EDUCATION/TRAINING PROGRAM

## 2024-05-13 PROCEDURE — 99223 1ST HOSP IP/OBS HIGH 75: CPT | Mod: HCNC,,, | Performed by: SURGERY

## 2024-05-13 PROCEDURE — 63600175 PHARM REV CODE 636 W HCPCS: Mod: JZ,JG,HCNC | Performed by: INTERNAL MEDICINE

## 2024-05-13 PROCEDURE — 36415 COLL VENOUS BLD VENIPUNCTURE: CPT | Mod: HCNC | Performed by: HOSPITALIST

## 2024-05-13 PROCEDURE — 80202 ASSAY OF VANCOMYCIN: CPT | Mod: HCNC | Performed by: HOSPITALIST

## 2024-05-13 PROCEDURE — 85025 COMPLETE CBC W/AUTO DIFF WBC: CPT | Mod: HCNC | Performed by: HOSPITALIST

## 2024-05-13 PROCEDURE — 90935 HEMODIALYSIS ONE EVALUATION: CPT | Mod: HCNC

## 2024-05-13 PROCEDURE — 25000003 PHARM REV CODE 250: Mod: HCNC | Performed by: EMERGENCY MEDICINE

## 2024-05-13 PROCEDURE — 99223 1ST HOSP IP/OBS HIGH 75: CPT | Mod: HCNC,,, | Performed by: STUDENT IN AN ORGANIZED HEALTH CARE EDUCATION/TRAINING PROGRAM

## 2024-05-13 PROCEDURE — 94761 N-INVAS EAR/PLS OXIMETRY MLT: CPT | Mod: HCNC

## 2024-05-13 PROCEDURE — 84100 ASSAY OF PHOSPHORUS: CPT | Mod: HCNC | Performed by: INTERNAL MEDICINE

## 2024-05-13 PROCEDURE — 11000001 HC ACUTE MED/SURG PRIVATE ROOM: Mod: HCNC

## 2024-05-13 PROCEDURE — 5A1D70Z PERFORMANCE OF URINARY FILTRATION, INTERMITTENT, LESS THAN 6 HOURS PER DAY: ICD-10-PCS | Performed by: INTERNAL MEDICINE

## 2024-05-13 PROCEDURE — 99223 1ST HOSP IP/OBS HIGH 75: CPT | Mod: HCNC,,,

## 2024-05-13 PROCEDURE — 85610 PROTHROMBIN TIME: CPT | Mod: HCNC | Performed by: STUDENT IN AN ORGANIZED HEALTH CARE EDUCATION/TRAINING PROGRAM

## 2024-05-13 PROCEDURE — 85730 THROMBOPLASTIN TIME PARTIAL: CPT | Mod: HCNC | Performed by: STUDENT IN AN ORGANIZED HEALTH CARE EDUCATION/TRAINING PROGRAM

## 2024-05-13 PROCEDURE — 80053 COMPREHEN METABOLIC PANEL: CPT | Mod: HCNC | Performed by: HOSPITALIST

## 2024-05-13 RX ORDER — HEPARIN SODIUM,PORCINE/D5W 25000/250
0-40 INTRAVENOUS SOLUTION INTRAVENOUS CONTINUOUS
Status: DISPENSED | OUTPATIENT
Start: 2024-05-13 | End: 2024-05-14

## 2024-05-13 RX ADMIN — TAMSULOSIN HYDROCHLORIDE 0.8 MG: 0.4 CAPSULE ORAL at 09:05

## 2024-05-13 RX ADMIN — ASPIRIN 81 MG: 81 TABLET, COATED ORAL at 09:05

## 2024-05-13 RX ADMIN — SEVELAMER CARBONATE 800 MG: 800 TABLET, FILM COATED ORAL at 04:05

## 2024-05-13 RX ADMIN — ATORVASTATIN CALCIUM 80 MG: 40 TABLET, FILM COATED ORAL at 09:05

## 2024-05-13 RX ADMIN — MEROPENEM 500 MG: 500 INJECTION INTRAVENOUS at 10:05

## 2024-05-13 RX ADMIN — FINASTERIDE 5 MG: 5 TABLET, FILM COATED ORAL at 04:05

## 2024-05-13 RX ADMIN — MUPIROCIN: 20 OINTMENT TOPICAL at 10:05

## 2024-05-13 RX ADMIN — APIXABAN 5 MG: 5 TABLET, FILM COATED ORAL at 09:05

## 2024-05-13 RX ADMIN — HEPARIN SODIUM 18 UNITS/KG/HR: 10000 INJECTION, SOLUTION INTRAVENOUS at 06:05

## 2024-05-13 RX ADMIN — Medication 1 CAPSULE: at 09:05

## 2024-05-13 RX ADMIN — VANCOMYCIN HYDROCHLORIDE 500 MG: 500 INJECTION, POWDER, LYOPHILIZED, FOR SOLUTION INTRAVENOUS at 05:05

## 2024-05-13 RX ADMIN — MUPIROCIN: 20 OINTMENT TOPICAL at 09:05

## 2024-05-13 RX ADMIN — EPOETIN ALFA-EPBX 10000 UNITS: 10000 INJECTION, SOLUTION INTRAVENOUS; SUBCUTANEOUS at 09:05

## 2024-05-13 RX ADMIN — LABETALOL HYDROCHLORIDE 100 MG: 100 TABLET, FILM COATED ORAL at 04:05

## 2024-05-13 RX ADMIN — SEVELAMER CARBONATE 800 MG: 800 TABLET, FILM COATED ORAL at 08:05

## 2024-05-13 NOTE — ASSESSMENT & PLAN NOTE
Worsening R foot wounds. Home health nursing noted purulence. No pain.   - blood cultures with Staph epi- likely contaminant  - continue vanc, meropenem given history of ESBL organisms  - he does not want further surgeries on his foot and is preparing himself mentally for amputation. Orthopaedics ([facility] consulted- Vascular consulted to make sure he can heal R BKA site.   - his L foot also has a wound- XR no osteomyelitis, wound care consulted   - Vascular surgery for amputation eval.

## 2024-05-13 NOTE — CONSULTS
[facility] - [place] - Observation  Vascular Surgery  Consult Note    Consults  Subjective:     Chief Complaint/Reason for Admission: R foot wounds    History of Present Illness:           Dipak Lim MD [facility]                         05/13/2024    HPI:  Miguel Moore is a 68 y.o. male with   Patient Active Problem List   Diagnosis    Benign essential HTN    Dyslipidemia    BPH (benign prostatic hyperplasia) 2/18/21 prostate bx normal    Seizure disorder as sequela of cerebrovascular accident    Hemiplegia and hemiparesis following cerebral infarction affecting right dominant side    Microcytosis 9/2019 labs c/w AoCD and AoCKD    ESRD (end stage renal disease)    Nuclear sclerosis, left    Cortical cataract of both eyes    DM type 2 without retinopathy    Secondary hyperparathyroidism of renal origin    Vitamin D deficiency    Right sided weakness    Senile nuclear sclerosis    CME (cystoid macular edema), bilateral    Refractive error    History of stroke    Type 2 diabetes mellitus with diabetic neuropathy, with long-term current use of insulin    History of fungal infection candida parasilosis hospitalization 8/2020    Chronic deep vein thrombosis (DVT) of popliteal vein of right lower extremity 9/21/20 outside [place]; unprovoked; anticoagulation    Pulmonary nodule 1/2021 4 mm nodule.    Elevated PSA 2/18/21 prostate bx normal    Anemia in chronic kidney disease    History of diabetic ulcer of foot    Pseudophakia    Bilateral posterior capsular opacification    Paroxysmal atrial fibrillation    Open wound    Peripheral artery disease    Abdominal aortic atherosclerosis on CT 4/1/24    Open wound of right foot    Cellulitis of right foot    being managed by PCP and specialists who is here today for evaluation of R foot wounds.  S/p debridement by Ortho.  Patient states location is R foot/leg occurring for months.  Associated signs and symptoms include pain and  discoloration.  Quality is aching and severity is 7/10.  Symptoms began mo ago.  Alleviating factors include wound care and rest.  Worsening factors include pressure.  Ortho consulted vascular surgery to eval for healing of R BKA.  HD without issues.    no MI  + Stroke  Tobacco use: never    Past Medical History:   Diagnosis Date    Allergy     Clotting disorder     Elaquis and APlavix    Deep vein thrombosis     Diabetes mellitus, type 2     Hypertension     Nuclear sclerosis of both eyes 6/9/2017    Renal disorder     Seizures     Stroke     Urinary tract infection      Past Surgical History:   Procedure Laterality Date    CATARACT EXTRACTION W/  INTRAOCULAR LENS IMPLANT Right 10/05/2017    Dr. Tay    CATARACT EXTRACTION W/  INTRAOCULAR LENS IMPLANT Left 10/19/2017    Dr. Tay    CYSTOSCOPY W/ RETROGRADES Bilateral 2/18/2021    Procedure: CYSTOSCOPY, WITH RETROGRADE PYELOGRAM;  Surgeon: JEMMA Styles MD;  Location: [facility] OR;  Service: Urology;  Laterality: Bilateral;  REQUESTING EARLY AS POSSIBE-LO / 2/8/2021 @ 1:13PM  RN Pre Op 2-11-21, Covid NEGATIVE ON  2-17-21.  C A    ESOPHAGOGASTRODUODENOSCOPY N/A 8/17/2020    Procedure: EGD (ESOPHAGOGASTRODUODENOSCOPY);  Surgeon: Desmond Chapman MD;  Location: [facility];  Service: Endoscopy;  Laterality: N/A;    EYE SURGERY Bilateral     cataract    FEMORAL ARTERY STENT      FRACTURE SURGERY      INCISION AND DRAINAGE, LOWER EXTREMITY Right 4/4/2024    Procedure: INCISION AND DRAINAGE, LOWER EXTREMITY;  Surgeon: Jimbo Montilla III, MD;  Location: [facility] OR;  Service: Orthopedics;  Laterality: Right;    INCISION AND DRAINAGE, LOWER EXTREMITY Right 4/6/2024    Procedure: INCISION AND DRAINAGE, LOWER EXTREMITY;  Surgeon: Desmond Barillas MD;  Location: [facility] OR;  Service: Orthopedics;  Laterality: Right;  foot and ankle    INCISION AND DRAINAGE, LOWER EXTREMITY Right 4/9/2024    Procedure: INCISION AND DRAINAGE, LOWER EXTREMITY- FOOT & ANKLE;  Surgeon:  Jimbo Montilla III, MD;  Location: [facility] OR;  Service: Orthopedics;  Laterality: Right;  CULTURES, VASCHE    KNEE SURGERY      bilateral scope    WOUND DRESSING Right 4/9/2024    Procedure: WOUND VAC EXCHANGE;  Surgeon: Jimbo Montilla III, MD;  Location: [facility] OR;  Service: Orthopedics;  Laterality: Right;     Family History   Problem Relation Name Age of Onset    Diabetes Mother      Heart disease Mother      Hypertension Mother      Cataracts Mother      No Known Problems Father      Hypertension Sister      No Known Problems Brother      No Known Problems Maternal Aunt      No Known Problems Maternal Uncle      No Known Problems Paternal Aunt      No Known Problems Paternal Uncle      No Known Problems Maternal Grandmother      No Known Problems Maternal Grandfather      No Known Problems Paternal Grandmother      No Known Problems Paternal Grandfather      No Known Problems Daughter      No Known Problems Other      Amblyopia Neg Hx      Blindness Neg Hx      Cancer Neg Hx      Glaucoma Neg Hx      Macular degeneration Neg Hx      Retinal detachment Neg Hx      Strabismus Neg Hx      Stroke Neg Hx      Thyroid disease Neg Hx       Social History     Socioeconomic History    Marital status: Single   Occupational History    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, etc   Tobacco Use    Smoking status: Never    Smokeless tobacco: Never   Substance and Sexual Activity    Alcohol use: No    Drug use: No   Social History Narrative    Lives with daughter       Current Facility-Administered Medications:     acetaminophen tablet 650 mg, 650 mg, Oral, Q6H PRN, Ajay Baxter Jr., NP    albuterol-ipratropium 2.5 mg-0.5 mg/3 mL nebulizer solution 3 mL, 3 mL, Nebulization, Q4H PRN, Ajay Baxter Jr., NP    aluminum-magnesium hydroxide-simethicone 200-200-20 mg/5 mL suspension 30 mL, 30 mL, Oral, QID PRN, Ajay Baxter Jr., NP    apixaban tablet 5 mg, 5 mg, Oral, BID, Ajay Baxter Jr., NP, 5 mg at  05/13/24 0921    aspirin EC tablet 81 mg, 81 mg, Oral, Daily, Ajay Baxter Jr., NP, 81 mg at 05/13/24 0921    atorvastatin tablet 80 mg, 80 mg, Oral, Daily, Patricia Reddy MD, 80 mg at 05/13/24 0920    bisacodyL suppository 10 mg, 10 mg, Rectal, Daily PRN, Patricia Reddy MD, 10 mg at 05/12/24 0634    dextrose 10% bolus 125 mL 125 mL, 12.5 g, Intravenous, PRN, Ajay Baxter Jr., NP    dextrose 10% bolus 250 mL 250 mL, 25 g, Intravenous, PRN, Ajay Baxter Jr., NP    epoetin marisol-epbx injection 10,000 Units, 10,000 Units, Subcutaneous, Every Mon, Wed, Fri, JoiKaren MD, 10,000 Units at 05/13/24 0921    finasteride tablet 5 mg, 5 mg, Oral, Daily, Ajay Baxter Jr., NP, 5 mg at 05/12/24 0840    glucagon (human recombinant) injection 1 mg, 1 mg, Intramuscular, PRN, Ajay Baxter Jr., NP    glucose chewable tablet 16 g, 16 g, Oral, PRN, Ajay Baxter Jr., NP    glucose chewable tablet 24 g, 24 g, Oral, PRN, Ajay Baxter Jr., NP    insulin aspart U-100 pen 0-10 Units, 0-10 Units, Subcutaneous, QID (AC + HS) PRN, Ajay Baxter Jr., NP    labetaloL tablet 100 mg, 100 mg, Oral, Daily, Ajay Baxter Jr., NP, 100 mg at 05/12/24 0840    melatonin tablet 6 mg, 6 mg, Oral, Nightly PRN, Ajay Baxter Jr., NP    meropenem (MERREM) 500 mg in sodium chloride 0.9 % 100 mL IVPB (MB+), 500 mg, Intravenous, Q24H, Homer Caraballo MD, Stopped at 05/12/24 2239    mupirocin 2 % ointment, , Nasal, BID, Patricia Reddy MD, Given at 05/13/24 0921    naloxone 0.4 mg/mL injection 0.02 mg, 0.02 mg, Intravenous, PRN, Ajay Baxter Jr., NP    ondansetron injection 4 mg, 4 mg, Intravenous, Q8H PRN, Ajay Baxter Jr., NP    oxyCODONE-acetaminophen 5-325 mg per tablet 1 tablet, 1 tablet, Oral, Q8H PRN, Ajay Baxter Jr., NP, 1 tablet at 05/12/24 1352    polyethylene glycol packet 17 g, 17 g, Oral, BID, Ajay Baxter Jr., NP, 17 g at 05/12/24 0839    prochlorperazine injection Soln 5 mg, 5 mg,  Intravenous, Q6H PRN, Ajay Baxter Jr., NP    sevelamer carbonate tablet 800 mg, 800 mg, Oral, TID WM, Ajay Baxter Jr., NP, 800 mg at 05/13/24 0815    simethicone chewable tablet 80 mg, 1 tablet, Oral, QID PRN, Ajay Baxter Jr., NP    sodium chloride 0.9% bolus 250 mL 250 mL, 250 mL, Intravenous, PRN, Karen Tamez MD    sodium chloride 0.9% flush 10 mL, 10 mL, Intravenous, Q8H PRN, Ajay Baxter Jr., NP    tamsulosin 24 hr capsule 0.8 mg, 2 capsule, Oral, Daily, Ajay Baxter Jr., NP, 0.8 mg at 05/13/24 0920    Pharmacy to dose Vancomycin consult, , , Once **AND** vancomycin - pharmacy to dose, , Intravenous, pharmacy to manage frequency, Homer Caraballo MD    vitamin renal formula (B-complex-vitamin c-folic acid) 1 mg per capsule 1 capsule, 1 capsule, Oral, Daily, Ajay Baxter Jr., NP, 1 capsule at 05/13/24 0920      Medications Prior to Admission   Medication Sig Dispense Refill Last Dose    apixaban (ELIQUIS) 5 mg Tab Take 1 tablet (5 mg total) by mouth 2 (two) times daily. 180 tablet 3     aspirin (ECOTRIN) 81 MG EC tablet Take 1 tablet (81 mg total) by mouth once daily. 90 tablet 3     dextrose 5 % in water (D5W) PgBk 50 mL with ceFAZolin 1 gram SolR Ancef 2g/2g/3g after HD on HD days       ferrous gluconate (FERGON) 324 MG tablet Take 1 tablet (324 mg total) by mouth 2 (two) times daily with meals. 60 tablet 11     finasteride (PROSCAR) 5 mg tablet Take 1 tablet (5 mg total) by mouth once daily. 90 tablet 3     iron sucrose in NS (VENOFER) 100 mg/100 mL PgBk 50 mg.       labetaloL (NORMODYNE) 100 MG tablet Take 1 tablet (100 mg total) by mouth once daily. 90 tablet 3     methoxy peg-epoetin beta (MIRCERA INJ) 75 mcg.       mupirocin (BACTROBAN) 2 % ointment Apply to affected area 3 times daily 22 g 1     oxyCODONE-acetaminophen (PERCOCET)  mg per tablet Take 1 tablet by mouth every 6 (six) hours as needed for Pain. 10 tablet 0     RENVELA 800 mg Tab Take 1 tablet (800 mg total) by  mouth 3 (three) times daily with meals. 90 tablet 0     tamsulosin (FLOMAX) 0.4 mg Cap Take 2 capsules (0.8 mg total) by mouth once daily. 180 capsule 3     vancomycin HCl (VANCOMYCIN 500 MG/100 ML D5W, READY TO MIX,) Vancomycin 500mg IV after HD on HD days (M/W/F)       vitamin renal formula, B-complex-vitamin c-folic acid, (NEPHROCAP) 1 mg Cap Take 1 capsule by mouth once daily. 30 capsule 11        Review of patient's allergies indicates:   Allergen Reactions    Ace inhibitors      Hyperkalemia 8/2018  Other reaction(s): Unknown    Penicillins Hives     Tolerated cefepime and cefdinir previously    Tizanidine      Felt hot       Past Medical History:   Diagnosis Date    Allergy     Clotting disorder     Elaquis and APlavix    Deep vein thrombosis     Diabetes mellitus, type 2     Hypertension     Nuclear sclerosis of both eyes 6/9/2017    Renal disorder     Seizures     Stroke     Urinary tract infection      Past Surgical History:   Procedure Laterality Date    CATARACT EXTRACTION W/  INTRAOCULAR LENS IMPLANT Right 10/05/2017    Dr. Tay    CATARACT EXTRACTION W/  INTRAOCULAR LENS IMPLANT Left 10/19/2017    Dr. Tay    CYSTOSCOPY W/ RETROGRADES Bilateral 2/18/2021    Procedure: CYSTOSCOPY, WITH RETROGRADE PYELOGRAM;  Surgeon: JEMMA Styles MD;  Location: [facility] OR;  Service: Urology;  Laterality: Bilateral;  REQUESTING EARLY AS POSSIBE-LO / 2/8/2021 @ 1:13PM  RN Pre Op 2-11-21, Covid NEGATIVE ON  2-17-21.  C A    ESOPHAGOGASTRODUODENOSCOPY N/A 8/17/2020    Procedure: EGD (ESOPHAGOGASTRODUODENOSCOPY);  Surgeon: Desmond Chapman MD;  Location: [facility] ENDO;  Service: Endoscopy;  Laterality: N/A;    EYE SURGERY Bilateral     cataract    FEMORAL ARTERY STENT      FRACTURE SURGERY      INCISION AND DRAINAGE, LOWER EXTREMITY Right 4/4/2024    Procedure: INCISION AND DRAINAGE, LOWER EXTREMITY;  Surgeon: Jimbo Montilla III, MD;  Location: [facility] OR;  Service: Orthopedics;  Laterality: Right;    INCISION AND  DRAINAGE, LOWER EXTREMITY Right 4/6/2024    Procedure: INCISION AND DRAINAGE, LOWER EXTREMITY;  Surgeon: Desmond Barillas MD;  Location: [facility] OR;  Service: Orthopedics;  Laterality: Right;  foot and ankle    INCISION AND DRAINAGE, LOWER EXTREMITY Right 4/9/2024    Procedure: INCISION AND DRAINAGE, LOWER EXTREMITY- FOOT & ANKLE;  Surgeon: Jimbo Montilla III, MD;  Location: [facility] OR;  Service: Orthopedics;  Laterality: Right;  CULTURES, VASCHE    KNEE SURGERY      bilateral scope    WOUND DRESSING Right 4/9/2024    Procedure: WOUND VAC EXCHANGE;  Surgeon: Jimbo Montilla III, MD;  Location: [facility] OR;  Service: Orthopedics;  Laterality: Right;     Family History       Problem Relation (Age of Onset)    Cataracts Mother    Diabetes Mother    Heart disease Mother    Hypertension Mother, Sister    No Known Problems Father, Brother, Maternal Aunt, Maternal Uncle, Paternal Aunt, Paternal Uncle, Maternal Grandmother, Maternal Grandfather, Paternal Grandmother, Paternal Grandfather, Daughter, Other          Tobacco Use    Smoking status: Never    Smokeless tobacco: Never   Substance and Sexual Activity    Alcohol use: No    Drug use: No    Sexual activity: Not on file     Review of Systems   Constitutional:  Negative for chills.   HENT:  Negative for congestion.    Eyes:  Negative for visual disturbance.   Respiratory:  Negative for shortness of breath.    Cardiovascular:  Negative for chest pain.   Gastrointestinal:  Negative for abdominal distention.   Endocrine: Negative for cold intolerance.   Genitourinary:  Negative for flank pain.   Musculoskeletal:  Negative for back pain.   Skin:  Positive for color change and wound. Negative for pallor and rash.   Allergic/Immunologic: Negative for immunocompromised state.   Neurological:  Negative for dizziness.   Hematological:  Does not bruise/bleed easily.   Psychiatric/Behavioral:  Negative for agitation.      Objective:     Vital Signs (Most Recent):  Temp: 98.3 °F (36.8  °C) (05/13/24 0739)  Pulse: 64 (05/13/24 0739)  Resp: 18 (05/13/24 0739)  BP: (!) 127/59 (05/13/24 0739)  SpO2: 98 % (05/13/24 0739) Vital Signs (24h Range):  Temp:  [97.6 °F (36.4 °C)-98.5 °F (36.9 °C)] 98.3 °F (36.8 °C)  Pulse:  [61-67] 64  Resp:  [17-20] 18  SpO2:  [97 %-100 %] 98 %  BP: (127-180)/(59-77) 127/59     Weight: 77 kg (169 lb 12.1 oz)  Body mass index is 25.81 kg/m².      Physical Exam  Vitals reviewed.   Constitutional:       General: He is not in acute distress.     Appearance: He is well-developed. He is not diaphoretic.   HENT:      Head: Normocephalic and atraumatic.   Eyes:      Conjunctiva/sclera: Conjunctivae normal.   Cardiovascular:      Rate and Rhythm: Normal rate.      Pulses:           Femoral pulses are 2+ on the right side and 2+ on the left side.       Popliteal pulses are 0 on the right side.        Dorsalis pedis pulses are detected w/ Doppler on the right side.   Pulmonary:      Effort: Pulmonary effort is normal.   Abdominal:      General: There is no distension.      Palpations: Abdomen is soft. There is no mass.      Tenderness: There is no abdominal tenderness. There is no guarding or rebound.      Hernia: No hernia is present.   Musculoskeletal:         General: No deformity. Normal range of motion.      Cervical back: Neck supple.   Feet:      Comments: Right foot wounds with dressing in place  Skin:     Findings: No rash.   Neurological:      Mental Status: He is alert and oriented to person, place, and time.          Significant Labs:  All pertinent labs from the last 24 hours have been reviewed.    Significant Diagnostics:  I have reviewed and interpreted all pertinent imaging results/findings within the past 24 hours.  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 I will follow-up with patient. Please contact us if you have any additional questions.    Dipak Lim MD  Vascular Surgery  [facility] - [place] - [facility]

## 2024-05-13 NOTE — PROGRESS NOTES
The patient is in bed.  His mood seems a bit better.  He still relates that he would like to go forth with amputation for his right foot rather than additional debridements.     Temp:  [97.7 °F (36.5 °C)-98.5 °F (36.9 °C)] 98.4 °F (36.9 °C)  Pulse:  [66-83] 67  Resp:  [18-20] 18  SpO2:  [93 %-100 %] 100 %  BP: (111-180)/(54-77) 172/70    Physical examination:    Right foot is dressed      Recent Labs   Lab 05/12/24  0520   WBC 3.92   RBC 3.35*   HGB 8.7*   HCT 27.1*      MCV 81*   MCH 26.0*   MCHC 32.1         Current Facility-Administered Medications:     0.9%  NaCl infusion, , Intravenous, Once, Karen Tamez MD    acetaminophen tablet 650 mg, 650 mg, Oral, Q6H PRN, Ajay Baxter Jr., NP    albuterol-ipratropium 2.5 mg-0.5 mg/3 mL nebulizer solution 3 mL, 3 mL, Nebulization, Q4H PRN, Ajay Baxter Jr., NP    aluminum-magnesium hydroxide-simethicone 200-200-20 mg/5 mL suspension 30 mL, 30 mL, Oral, QID PRN, Ajay Baxter Jr., NP    apixaban tablet 5 mg, 5 mg, Oral, BID, Ajay Baxter Jr., NP, 5 mg at 05/12/24 0840    aspirin EC tablet 81 mg, 81 mg, Oral, Daily, Ajay Baxter Jr., NP, 81 mg at 05/12/24 0840    atorvastatin tablet 80 mg, 80 mg, Oral, Daily, Patricia Reddy MD, 80 mg at 05/12/24 0840    bisacodyL suppository 10 mg, 10 mg, Rectal, Daily PRN, Patricia Reddy MD, 10 mg at 05/12/24 0634    dextrose 10% bolus 125 mL 125 mL, 12.5 g, Intravenous, PRN, Ajay Baxter Jr., NP    dextrose 10% bolus 250 mL 250 mL, 25 g, Intravenous, PRN, Ajay Baxter Jr., NP    [START ON 5/13/2024] epoetin marisol-epbx injection 10,000 Units, 10,000 Units, Subcutaneous, Every Mon, Wed, Fri, Karen Tamez MD    finasteride tablet 5 mg, 5 mg, Oral, Daily, Ajay Baxter Jr., NP, 5 mg at 05/12/24 0840    glucagon (human recombinant) injection 1 mg, 1 mg, Intramuscular, PRN, Ajay Baxter Jr., NP    glucose chewable tablet 16 g, 16 g, Oral, PRN, Ajay Baxter Jr., NP    glucose chewable tablet 24 g, 24  g, Oral, PRN, Ajay Baxter Jr., NP    insulin aspart U-100 pen 0-10 Units, 0-10 Units, Subcutaneous, QID (AC + HS) PRN, Ajay Baxter Jr., NP    labetaloL tablet 100 mg, 100 mg, Oral, Daily, Ajay Baxter Jr., NP, 100 mg at 05/12/24 0840    melatonin tablet 6 mg, 6 mg, Oral, Nightly PRN, Ajay Baxter Jr., NP    meropenem (MERREM) 500 mg in sodium chloride 0.9 % 100 mL IVPB (MB+), 500 mg, Intravenous, Q24H, Homer Caraballo MD, Stopped at 05/11/24 2237    mupirocin 2 % ointment, , Nasal, BID, Patricia Reddy MD, Given at 05/12/24 0840    naloxone 0.4 mg/mL injection 0.02 mg, 0.02 mg, Intravenous, PRN, Ajay Baxter Jr., NP    ondansetron injection 4 mg, 4 mg, Intravenous, Q8H PRN, Ajay Baxter Jr., NP    oxyCODONE-acetaminophen 5-325 mg per tablet 1 tablet, 1 tablet, Oral, Q8H PRN, Ajay Baxter Jr., NP, 1 tablet at 05/12/24 1352    polyethylene glycol packet 17 g, 17 g, Oral, BID, Ajay Baxter Jr., NP, 17 g at 05/12/24 0839    prochlorperazine injection Soln 5 mg, 5 mg, Intravenous, Q6H PRN, Ajay Baxter Jr., NP    sevelamer carbonate tablet 800 mg, 800 mg, Oral, TID WM, Ajay Baxter Jr., NP, 800 mg at 05/12/24 1650    simethicone chewable tablet 80 mg, 1 tablet, Oral, QID PRN, Ajay Baxter Jr., NP    sodium chloride 0.9% bolus 250 mL 250 mL, 250 mL, Intravenous, PRN, Karen Tamez MD    sodium chloride 0.9% flush 10 mL, 10 mL, Intravenous, Q8H PRN, Ajay Baxter Jr., NP    tamsulosin 24 hr capsule 0.8 mg, 2 capsule, Oral, Daily, Ajay Baxter Jr., NP, 0.8 mg at 05/12/24 0840    Pharmacy to dose Vancomycin consult, , , Once **AND** vancomycin - pharmacy to dose, , Intravenous, pharmacy to manage frequency, Homer Caraballo MD    vitamin renal formula (B-complex-vitamin c-folic acid) 1 mg per capsule 1 capsule, 1 capsule, Oral, Daily, Ajay Baxter Jr., NP, 1 capsule at 05/12/24 0839    Assessment and Plan:    68-year-old male with end-stage renal disease and right foot  abscess.  He previously been treated for right ankle abscess.  He has evidence of necrosis of the skin on his foot.  He has a right foot plantar abscess draining purulent material.     The patient requests considering amputation rather than further debridements.     Awaiting vascular surgery for evaluation.       Jimbo Montilla MD  [facility]  [phone]  This note has been transcribed with voice recognition software, but not reviewed and may contain unrecognized errors.

## 2024-05-13 NOTE — PROGRESS NOTES
Pharmacokinetic Assessment Follow Up: IV Vancomycin    Vancomycin serum concentration assessment(s):    The random level was drawn correctly and can be used to guide therapy at this time. The measurement is within the desired definitive target range of 10 to 20 mcg/mL.    Vancomycin Regimen Plan:    Give 500 mg after dialysis today.  Re-dose when the random level is less than 20 mcg/mL, next level to be drawn at 0400 on 5/14/2024    Drug levels (last 3 results):  Recent Labs   Lab Result Units 05/11/24  0457 05/12/24  0520 05/13/24  0441   Vancomycin, Random ug/mL 18.7 18.0 17.3       Pharmacy will continue to follow and monitor vancomycin.    Please contact pharmacy at extension [phone] for questions regarding this assessment.    Thank you for the consult,   Jakob Goldberg Jr       Patient brief summary:  Miguel Moore is a 68 y.o. male initiated on antimicrobial therapy with IV Vancomycin for treatment of skin & soft tissue infection    Drug Allergies:   Review of patient's allergies indicates:   Allergen Reactions    Ace inhibitors      Hyperkalemia 8/2018  Other reaction(s): Unknown    Penicillins Hives     Tolerated cefepime and cefdinir previously    Tizanidine      Felt hot       Actual Body Weight:   77 kg    Renal Function:   Estimated Creatinine Clearance: 9 mL/min (A) (based on SCr of 7.5 mg/dL (H)).,     Dialysis Method (if applicable):  intermittent HD    CBC (last 72 hours):  Recent Labs   Lab Result Units 05/10/24  1546 05/11/24  0457 05/12/24  0520 05/13/24  0441   WBC K/uL 4.63 5.40 3.92 3.96   Hemoglobin g/dL 9.7* 9.7* 8.7* 8.3*   Hematocrit % 30.2* 31.6* 27.1* 25.9*   Platelets K/uL 186 179 181 181   Gran % % 69.9 68.9 56.7 60.5   Lymph % % 18.1 20.4 27.0 24.0   Mono % % 8.4 7.8 10.2 8.6   Eosinophil % % 3.0 2.0 4.8 5.8   Basophil % % 0.4 0.7 0.8 0.8   Differential Method  Automated Automated Automated Automated       Metabolic Panel (last 72 hours):  Recent Labs   Lab Result Units  05/10/24  1546 05/11/24  0457 05/12/24  0520 05/13/24  0441   Sodium mmol/L 139 139 140 138   Potassium mmol/L 3.6 3.6 3.7 3.8   Chloride mmol/L 100 102 102 101   CO2 mmol/L 29 27 26 23   Glucose mg/dL 97 107 92 112*   BUN mg/dL 11 16 22 27*   Creatinine mg/dL 3.7* 4.5* 6.0* 7.5*   Albumin g/dL 2.0* 1.9* 1.7* 1.8*   Total Bilirubin mg/dL 0.4 0.3 0.3 0.3   Alkaline Phosphatase U/L 76 73 63 62   AST U/L 13 11 10 9*   ALT U/L <5* <5* <5* <5*   Phosphorus mg/dL  --   --   --  5.4*       Vancomycin Administrations:  vancomycin given in the last 96 hours        No antibiotic orders with administrations found.                    Microbiologic Results:  Microbiology Results (last 7 days)       Procedure Component Value Units Date/Time    Aerobic culture (Specify Source) **CANNOT BE ORDERED AS STAT** [[id number]] Collected: 05/10/24 1703    Order Status: Completed Specimen: Abscess from Foot, Right Updated: 05/13/24 0530     Aerobic Bacterial Culture No growth    Blood culture [[id number]] Collected: 05/11/24 2006    Order Status: Completed Specimen: Blood from Peripheral, Hand, Right Updated: 05/12/24 2303     Blood Culture, Routine No Growth to date      No Growth to date    Narrative:      could not draw second set cultures, due to patient being a hard   stick. reported to nurse Hidalgo    Blood culture [[id number]] Collected: 05/11/24 1335    Order Status: Completed Specimen: Blood Updated: 05/12/24 1703     Blood Culture, Routine No Growth to date      No Growth to date    Blood culture #2 [[id number]]  (Abnormal) Collected: 05/10/24 1547    Order Status: Completed Specimen: Blood from Peripheral, Hand, Right Updated: 05/12/24 0726     Blood Culture, Routine Gram stain aer bottle: Gram positive cocci in clusters resembling Staph      Positive results previously called      COAGULASE-NEGATIVE STAPHYLOCOCCUS SPECIES  Organism is a probable contaminant      Narrative:      Blood Culture #2    Blood culture #1 [[id number]]   (Abnormal) Collected: 05/10/24 1545    Order Status: Completed Specimen: Blood from Peripheral, Antecubital, Right Updated: 05/12/24 0725     Blood Culture, Routine Gram stain aer bottle: Gram positive cocci in clusters resembling Staph      Results called to and read back by: Gwen Staley 05/11/2024  09:56      COAGULASE-NEGATIVE STAPHYLOCOCCUS SPECIES  Organism is a probable contaminant      Narrative:      Blood Culture #1

## 2024-05-13 NOTE — SUBJECTIVE & OBJECTIVE
Interval History: No pain in foot. Awaiting assessment regarding amputation from vascular surgery.    Objective:     Vital Signs (Most Recent):  Temp: 98.3 °F (36.8 °C) (05/13/24 0739)  Pulse: 64 (05/13/24 0739)  Resp: 18 (05/13/24 0739)  BP: (!) 127/59 (05/13/24 0739)  SpO2: 98 % (05/13/24 0739) Vital Signs (24h Range):  Temp:  [97.6 °F (36.4 °C)-98.5 °F (36.9 °C)] 98.3 °F (36.8 °C)  Pulse:  [61-69] 64  Resp:  [17-20] 18  SpO2:  [97 %-100 %] 98 %  BP: (127-180)/(59-77) 127/59     Weight: 77 kg (169 lb 12.1 oz)  Body mass index is 25.81 kg/m².    Intake/Output Summary (Last 24 hours) at 5/13/2024 1008  Last data filed at 5/13/2024 0800  Gross per 24 hour   Intake 960 ml   Output --   Net 960 ml         Physical Exam  Vitals and nursing note reviewed.   Constitutional:       General: He is not in acute distress.     Appearance: Normal appearance. He is not ill-appearing.   HENT:      Head: Normocephalic and atraumatic.   Eyes:      General: No scleral icterus.     Pupils: Pupils are equal, round, and reactive to light.   Cardiovascular:      Rate and Rhythm: Normal rate and regular rhythm.      Pulses: Normal pulses.      Heart sounds: Normal heart sounds.   Pulmonary:      Effort: Pulmonary effort is normal.      Breath sounds: Normal breath sounds.   Abdominal:      General: Abdomen is flat. There is no distension.      Palpations: Abdomen is soft.      Tenderness: There is no abdominal tenderness.   Musculoskeletal:         General: Deformity and signs of injury present.      Right lower leg: No edema.      Left lower leg: No edema.   Lymphadenopathy:      Cervical: No cervical adenopathy.   Skin:     General: Skin is warm and dry.      Capillary Refill: Capillary refill takes more than 3 seconds.      Coloration: Skin is pale.      Findings: Lesion present.   Neurological:      General: No focal deficit present.      Mental Status: He is alert and oriented to person, place, and time.             Significant Labs:  All pertinent labs within the past 24 hours have been reviewed.  CBC:   Recent Labs   Lab 05/12/24  0520 05/13/24  0441   WBC 3.92 3.96   HGB 8.7* 8.3*   HCT 27.1* 25.9*    181     CMP:   Recent Labs   Lab 05/12/24  0520 05/13/24  0441    138   K 3.7 3.8    101   CO2 26 23   GLU 92 112*   BUN 22 27*   CREATININE 6.0* 7.5*   CALCIUM 8.1* 8.2*   PROT 5.2* 5.2*   ALBUMIN 1.7* 1.8*   BILITOT 0.3 0.3   ALKPHOS 63 62   AST 10 9*   ALT <5* <5*   ANIONGAP 12 14       Significant Imaging: I have reviewed all pertinent imaging results/findings within the past 24 hours.

## 2024-05-13 NOTE — HPI
Dipak Lim MD [facility]                         05/13/2024    HPI:  Miguel Moore is a 68 y.o. male with   Patient Active Problem List   Diagnosis    Benign essential HTN    Dyslipidemia    BPH (benign prostatic hyperplasia) 2/18/21 prostate bx normal    Seizure disorder as sequela of cerebrovascular accident    Hemiplegia and hemiparesis following cerebral infarction affecting right dominant side    Microcytosis 9/2019 labs c/w AoCD and AoCKD    ESRD (end stage renal disease)    Nuclear sclerosis, left    Cortical cataract of both eyes    DM type 2 without retinopathy    Secondary hyperparathyroidism of renal origin    Vitamin D deficiency    Right sided weakness    Senile nuclear sclerosis    CME (cystoid macular edema), bilateral    Refractive error    History of stroke    Type 2 diabetes mellitus with diabetic neuropathy, with long-term current use of insulin    History of fungal infection candida parasilosis hospitalization 8/2020    Chronic deep vein thrombosis (DVT) of popliteal vein of right lower extremity 9/21/20 outside US; unprovoked; anticoagulation    Pulmonary nodule 1/2021 4 mm nodule.    Elevated PSA 2/18/21 prostate bx normal    Anemia in chronic kidney disease    History of diabetic ulcer of foot    Pseudophakia    Bilateral posterior capsular opacification    Paroxysmal atrial fibrillation    Open wound    Peripheral artery disease    Abdominal aortic atherosclerosis on CT 4/1/24    Open wound of right foot    Cellulitis of right foot    being managed by PCP and specialists who is here today for evaluation of R foot wounds.  S/p debridement by Ortho.  Patient states location is R foot/leg occurring for months.  Associated signs and symptoms include pain and discoloration.  Quality is aching and severity is 7/10.  Symptoms began mo ago.  Alleviating factors include wound care and rest.  Worsening factors include pressure.  Ortho  consulted vascular surgery to eval for healing of R BKA.  HD without issues.    no MI  + Stroke  Tobacco use: never    Past Medical History:   Diagnosis Date    Allergy     Clotting disorder     Elaquis and APlavix    Deep vein thrombosis     Diabetes mellitus, type 2     Hypertension     Nuclear sclerosis of both eyes 6/9/2017    Renal disorder     Seizures     Stroke     Urinary tract infection      Past Surgical History:   Procedure Laterality Date    CATARACT EXTRACTION W/  INTRAOCULAR LENS IMPLANT Right 10/05/2017    Dr. Tay    CATARACT EXTRACTION W/  INTRAOCULAR LENS IMPLANT Left 10/19/2017    Dr. Tay    CYSTOSCOPY W/ RETROGRADES Bilateral 2/18/2021    Procedure: CYSTOSCOPY, WITH RETROGRADE PYELOGRAM;  Surgeon: JEMMA Styles MD;  Location: [facility] OR;  Service: Urology;  Laterality: Bilateral;  REQUESTING EARLY AS POSSIBE-LO / 2/8/2021 @ 1:13PM  RN Pre Op 2-11-21, Covid NEGATIVE ON  2-17-21.  C A    ESOPHAGOGASTRODUODENOSCOPY N/A 8/17/2020    Procedure: EGD (ESOPHAGOGASTRODUODENOSCOPY);  Surgeon: Desmond Chapman MD;  Location: [facility] ENDO;  Service: Endoscopy;  Laterality: N/A;    EYE SURGERY Bilateral     cataract    FEMORAL ARTERY STENT      FRACTURE SURGERY      INCISION AND DRAINAGE, LOWER EXTREMITY Right 4/4/2024    Procedure: INCISION AND DRAINAGE, LOWER EXTREMITY;  Surgeon: Jimbo Montilla III, MD;  Location: [facility] OR;  Service: Orthopedics;  Laterality: Right;    INCISION AND DRAINAGE, LOWER EXTREMITY Right 4/6/2024    Procedure: INCISION AND DRAINAGE, LOWER EXTREMITY;  Surgeon: Desmond Barillas MD;  Location: [facility] OR;  Service: Orthopedics;  Laterality: Right;  foot and ankle    INCISION AND DRAINAGE, LOWER EXTREMITY Right 4/9/2024    Procedure: INCISION AND DRAINAGE, LOWER EXTREMITY- FOOT & ANKLE;  Surgeon: Jimbo Montilla III, MD;  Location: [facility] OR;  Service: Orthopedics;  Laterality: Right;  CULTURES, VASCHE    KNEE SURGERY      bilateral scope    WOUND DRESSING Right 4/9/2024     Procedure: WOUND VAC EXCHANGE;  Surgeon: Jimbo Montilla III, MD;  Location: [facility];  Service: Orthopedics;  Laterality: Right;     Family History   Problem Relation Name Age of Onset    Diabetes Mother      Heart disease Mother      Hypertension Mother      Cataracts Mother      No Known Problems Father      Hypertension Sister      No Known Problems Brother      No Known Problems Maternal Aunt      No Known Problems Maternal Uncle      No Known Problems Paternal Aunt      No Known Problems Paternal Uncle      No Known Problems Maternal Grandmother      No Known Problems Maternal Grandfather      No Known Problems Paternal Grandmother      No Known Problems Paternal Grandfather      No Known Problems Daughter      No Known Problems Other      Amblyopia Neg Hx      Blindness Neg Hx      Cancer Neg Hx      Glaucoma Neg Hx      Macular degeneration Neg Hx      Retinal detachment Neg Hx      Strabismus Neg Hx      Stroke Neg Hx      Thyroid disease Neg Hx       Social History     Socioeconomic History    Marital status: Single   Occupational History    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, etc   Tobacco Use    Smoking status: Never    Smokeless tobacco: Never   Substance and Sexual Activity    Alcohol use: No    Drug use: No   Social History Narrative    Lives with daughter       Current Facility-Administered Medications:     acetaminophen tablet 650 mg, 650 mg, Oral, Q6H PRN, Ajay Baxter Jr., NP    albuterol-ipratropium 2.5 mg-0.5 mg/3 mL nebulizer solution 3 mL, 3 mL, Nebulization, Q4H PRN, Ajay Baxter Jr., NP    aluminum-magnesium hydroxide-simethicone 200-200-20 mg/5 mL suspension 30 mL, 30 mL, Oral, QID PRN, Ajay Baxter Jr., NP    apixaban tablet 5 mg, 5 mg, Oral, BID, Ajay Baxter Jr., NP, 5 mg at 05/13/24 0921    aspirin EC tablet 81 mg, 81 mg, Oral, Daily, Ajay Baxter Jr., NP, 81 mg at 05/13/24 0921    atorvastatin tablet 80 mg, 80 mg, Oral, Daily, Patricia Reddy,  MD, 80 mg at 05/13/24 0920    bisacodyL suppository 10 mg, 10 mg, Rectal, Daily PRN, Patricia Reddy MD, 10 mg at 05/12/24 0634    dextrose 10% bolus 125 mL 125 mL, 12.5 g, Intravenous, PRN, Ajya Baxter Jr., NP    dextrose 10% bolus 250 mL 250 mL, 25 g, Intravenous, PRN, Ajay Baxter Jr., NP    epoetin marisol-epbx injection 10,000 Units, 10,000 Units, Subcutaneous, Every Mon, Wed, Fri, JoiKaren MD, 10,000 Units at 05/13/24 0921    finasteride tablet 5 mg, 5 mg, Oral, Daily, Ajay Baxter Jr., NP, 5 mg at 05/12/24 0840    glucagon (human recombinant) injection 1 mg, 1 mg, Intramuscular, PRN, Ajay Baxter Jr., NP    glucose chewable tablet 16 g, 16 g, Oral, PRN, Ajay Baxter Jr., NP    glucose chewable tablet 24 g, 24 g, Oral, PRN, Ajay Baxter Jr., NP    insulin aspart U-100 pen 0-10 Units, 0-10 Units, Subcutaneous, QID (AC + HS) PRN, Ajay Baxter Jr., NP    labetaloL tablet 100 mg, 100 mg, Oral, Daily, Ajay Baxter Jr., NP, 100 mg at 05/12/24 0840    melatonin tablet 6 mg, 6 mg, Oral, Nightly PRN, Ajay Baxter Jr., NP    meropenem (MERREM) 500 mg in sodium chloride 0.9 % 100 mL IVPB (MB+), 500 mg, Intravenous, Q24H, Homer Caraballo MD, Stopped at 05/12/24 2239    mupirocin 2 % ointment, , Nasal, BID, Patricia Reddy MD, Given at 05/13/24 0921    naloxone 0.4 mg/mL injection 0.02 mg, 0.02 mg, Intravenous, PRN, Ajay Baxter Jr., NP    ondansetron injection 4 mg, 4 mg, Intravenous, Q8H PRN, Ajay Baxter Jr., NP    oxyCODONE-acetaminophen 5-325 mg per tablet 1 tablet, 1 tablet, Oral, Q8H PRN, Ajay Baxter Jr., NP, 1 tablet at 05/12/24 1352    polyethylene glycol packet 17 g, 17 g, Oral, BID, Ajay Baxter Jr., NP, 17 g at 05/12/24 0839    prochlorperazine injection Soln 5 mg, 5 mg, Intravenous, Q6H PRN, Ajay Baxter Jr., NP    sevelamer carbonate tablet 800 mg, 800 mg, Oral, TID WM, Ajay Baxter Jr., NP, 800 mg at 05/13/24 0815    simethicone chewable tablet 80  mg, 1 tablet, Oral, QID PRN, Ajay Baxter Jr., NP    sodium chloride 0.9% bolus 250 mL 250 mL, 250 mL, Intravenous, PRN, Karen Tamez MD    sodium chloride 0.9% flush 10 mL, 10 mL, Intravenous, Q8H PRN, Ajay Baxter Jr., NP    tamsulosin 24 hr capsule 0.8 mg, 2 capsule, Oral, Daily, Ajay Baxter Jr., NP, 0.8 mg at 05/13/24 0920    Pharmacy to dose Vancomycin consult, , , Once **AND** vancomycin - pharmacy to dose, , Intravenous, pharmacy to manage frequency, Homer Caraballo MD    vitamin renal formula (B-complex-vitamin c-folic acid) 1 mg per capsule 1 capsule, 1 capsule, Oral, Daily, Ajay Baxter Jr., NP, 1 capsule at 05/13/24 0920

## 2024-05-13 NOTE — PROGRESS NOTES
[facility] Medicine  Progress Note    Patient Name: Miguel Moore  MRN: [id number]  Patient Class: IP- Inpatient   Admission Date: 5/10/2024  Length of Stay: 3 days  Attending Physician: Broderick Hernandez MD  Primary Care Provider: Raciel Raymond MD        Subjective:     Principal Problem:Open wound of right foot        HPI:  68 y.o. male with hypertension, BPH, chronic right lower extremity DVT, DM2, dyslipidemia, ESRD on HD, hemiplegia and hemiparesis following cerebral infarction affecting the right dominant side, paroxysmal AFib, seizure disorder as sequela of CVA sent to the ED from wound care for new wound infection.  Denies fever, chills, cough, shortness breath, chest pain, dizziness, syncope.  Was hospitalized in March for cellulitis and abscess to the right foot with surgical intervention at that time.  In the ED today, CRP and procalcitonin elevated.  Blood and wound cultures were obtained.  IV antibiotics initiated.    Overview/Hospital Course:  Mr Miguel Moore was admitted with worsening wound to his right foot. Started vancomycin and meropenem given his history of ESBL organisms. He states he is not interested in further I&D on his right foot and is mentally preparing himself for amputation. Orthopaedics consulted. Vascular consulted to make sure that he will be able to heal BKA.     Interval History: No pain in foot. Awaiting assessment regarding amputation from vascular surgery.    Objective:     Vital Signs (Most Recent):  Temp: 98.3 °F (36.8 °C) (05/13/24 0739)  Pulse: 64 (05/13/24 0739)  Resp: 18 (05/13/24 0739)  BP: (!) 127/59 (05/13/24 0739)  SpO2: 98 % (05/13/24 0739) Vital Signs (24h Range):  Temp:  [97.6 °F (36.4 °C)-98.5 °F (36.9 °C)] 98.3 °F (36.8 °C)  Pulse:  [61-69] 64  Resp:  [17-20] 18  SpO2:  [97 %-100 %] 98 %  BP: (127-180)/(59-77) 127/59     Weight: 77 kg (169 lb 12.1 oz)  Body mass index is 25.81 kg/m².    Intake/Output Summary (Last 24 hours) at 5/13/2024  1008  Last data filed at 5/13/2024 0800  Gross per 24 hour   Intake 960 ml   Output --   Net 960 ml         Physical Exam  Vitals and nursing note reviewed.   Constitutional:       General: He is not in acute distress.     Appearance: Normal appearance. He is not ill-appearing.   HENT:      Head: Normocephalic and atraumatic.   Eyes:      General: No scleral icterus.     Pupils: Pupils are equal, round, and reactive to light.   Cardiovascular:      Rate and Rhythm: Normal rate and regular rhythm.      Pulses: Normal pulses.      Heart sounds: Normal heart sounds.   Pulmonary:      Effort: Pulmonary effort is normal.      Breath sounds: Normal breath sounds.   Abdominal:      General: Abdomen is flat. There is no distension.      Palpations: Abdomen is soft.      Tenderness: There is no abdominal tenderness.   Musculoskeletal:         General: Deformity and signs of injury present.      Right lower leg: No edema.      Left lower leg: No edema.   Lymphadenopathy:      Cervical: No cervical adenopathy.   Skin:     General: Skin is warm and dry.      Capillary Refill: Capillary refill takes more than 3 seconds.      Coloration: Skin is pale.      Findings: Lesion present.   Neurological:      General: No focal deficit present.      Mental Status: He is alert and oriented to person, place, and time.             Significant Labs: All pertinent labs within the past 24 hours have been reviewed.  CBC:   Recent Labs   Lab 05/12/24  0520 05/13/24  0441   WBC 3.92 3.96   HGB 8.7* 8.3*   HCT 27.1* 25.9*    181     CMP:   Recent Labs   Lab 05/12/24  0520 05/13/24  0441    138   K 3.7 3.8    101   CO2 26 23   GLU 92 112*   BUN 22 27*   CREATININE 6.0* 7.5*   CALCIUM 8.1* 8.2*   PROT 5.2* 5.2*   ALBUMIN 1.7* 1.8*   BILITOT 0.3 0.3   ALKPHOS 63 62   AST 10 9*   ALT <5* <5*   ANIONGAP 12 14       Significant Imaging: I have reviewed all pertinent imaging results/findings within the past 24  hours.    Assessment/Plan:      * Open wound of right foot  Worsening R foot wounds. Home health nursing noted purulence. No pain.   - blood cultures with Staph epi- likely contaminant  - continue vanc, meropenem given history of ESBL organisms  - he does not want further surgeries on his foot and is preparing himself mentally for amputation. Orthopaedics ([facility]) consulted- Vascular consulted to make sure he can heal R BKA site.   - his L foot also has a wound- XR no osteomyelitis, wound care consulted   - Vascular surgery for amputation eval.    Peripheral artery disease  3/20/24 RLE arterial US Arterial vasculature of the right lower extremity is patent.  However, there is evidence of atherosclerotic change with stenosis at the level of popliteal artery.   - continue asa  - ok to resume statin now that LFTs are normal  - Vascular consulted to make sure he will be able to heal right BKA      Paroxysmal atrial fibrillation  Patient with Paroxysmal (<7 days) atrial fibrillation which is controlled currently with Beta Blocker. Patient is currently in sinus rhythm.[id number] Score: 2. Anticoagulation indicated. Anticoagulation done with apixaban .    Anemia in chronic kidney disease  Patient's anemia is currently controlled. Has not received any PRBCs to date. Etiology likely d/t chronic disease due to ESRD  Current CBC reviewed-   Lab Results   Component Value Date    HGB 8.7 (L) 05/12/2024    HCT 27.1 (L) 05/12/2024     Monitor serial CBC and transfuse if patient becomes hemodynamically unstable, symptomatic or H/H drops below 7/21.    Type 2 diabetes mellitus with diabetic neuropathy, with long-term current use of insulin  Patient's FSGs are controlled on current medication regimen.  Last A1c reviewed-   Lab Results   Component Value Date    HGBA1C 5.8 (H) 03/20/2024     Most recent fingerstick glucose reviewed-   Recent Labs   Lab 05/11/24  1150 05/12/24  0734   POCTGLUCOSE 109 130*       Current  correctional scale  Medium  Maintain anti-hyperglycemic dose as follows-   Antihyperglycemics (From admission, onward)      Start     Stop Route Frequency Ordered    05/10/24 1858  insulin aspart U-100 pen 0-10 Units         -- SubQ Before meals & nightly PRN 05/10/24 1758            Secondary hyperparathyroidism of renal origin  Continue sevelamer      ESRD (end stage renal disease)  Receives HD MWF via LUE AVF. Has not missed any sessions.   - Nephrology consulted  - appears euvolemic, no indication for acute dialysis today    Hemiplegia and hemiparesis following cerebral infarction affecting right dominant side  At baseline  Continue eliquis/asa, BP Rx  LFTs normalized- can resume statin now     BPH (benign prostatic hyperplasia) 2/18/21 prostate bx normal  Continue home regimen of tamsulosin and finasteride    Dyslipidemia  Not currently on statin due to previously elevated LFTs. LFTs now normal- can resume statin     Benign essential HTN  Chronic, controlled. Latest blood pressure and vitals reviewed-     Temp:  [97.7 °F (36.5 °C)-98.7 °F (37.1 °C)]   Pulse:  [62-85]   Resp:  [17-20]   BP: (111-173)/(54-70)   SpO2:  [93 %-100 %] .   Home meds for hypertension were reviewed and noted below.   Hypertension Medications               labetaloL (NORMODYNE) 100 MG tablet Take 1 tablet (100 mg total) by mouth once daily.            While in the hospital, will manage blood pressure as follows; Continue home antihypertensive regimen    Will utilize p.r.n. blood pressure medication only if patient's blood pressure greater than 180/110 and he develops symptoms such as worsening chest pain or shortness of breath.      VTE Risk Mitigation (From admission, onward)           Ordered     apixaban tablet 5 mg  2 times daily         05/10/24 1758     IP VTE HIGH RISK PATIENT  Once         05/10/24 1758     Place sequential compression device  Until discontinued         05/10/24 1758     Reason for No Pharmacological VTE  Prophylaxis  Once        Question:  Reasons:  Answer:  Already adequately anticoagulated on oral Anticoagulants    05/10/24 1758                    Discharge Planning   GARY:      Code Status: Full Code   Is the patient medically ready for discharge?:     Reason for patient still in hospital (select all that apply): Patient trending condition  Discharge Plan A: Home with family                  Broderick Hernandez MD  Department of Hospital Medicine   [facility]

## 2024-05-13 NOTE — CONSULTS
[facility] [place]  Wound Care  [facility]    Patient Name:  Miguel Moore   MRN:  [id number]  Date: 5/13/2024  Diagnosis: Open wound of right foot    History:     Past Medical History:   Diagnosis Date    Allergy     Clotting disorder     Elaquis and APlavix    Deep vein thrombosis     Diabetes mellitus, type 2     Hypertension     Nuclear sclerosis of both eyes 6/9/2017    Renal disorder     Seizures     Stroke     Urinary tract infection        Social History     Socioeconomic History    Marital status: Single   Occupational History    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, etc   Tobacco Use    Smoking status: Never    Smokeless tobacco: Never   Substance and Sexual Activity    Alcohol use: No    Drug use: No   Social History Narrative    Lives with daughter       Precautions:     Allergies as of 05/10/2024 - Reviewed 05/10/2024   Allergen Reaction Noted    Ace inhibitors  09/03/2018    Penicillins Hives 01/13/2015    Tizanidine  06/30/2020       [facility] Assessment Details/Treatment     Active Problem List with Overview Notes    Diagnosis Date Noted    Cellulitis of right foot 05/13/2024    Atherosclerosis of native artery of right lower extremity with ulceration 05/13/2024    Open wound of right foot 05/10/2024    Peripheral artery disease 04/25/2024     3/20/24 RLE arterial US Arterial vasculature of the right lower extremity is patent.  However, there is evidence of atherosclerotic change with stenosis at the level of popliteal artery.       Abdominal aortic atherosclerosis on CT 4/1/24 04/25/2024    Open wound 03/26/2024    Paroxysmal atrial fibrillation 03/20/2024     3/20/24 TTE LV normal systolic function LVEF 55-60%.  Converted to sinus rhythm on amiodarone   Amiodarone stopped on hospital discharge due to possible contributor to transaminitis      Bilateral posterior capsular opacification 05/02/2023    Pseudophakia 01/06/2022    History of diabetic ulcer of foot     Elevated PSA 2/18/21  prostate bx normal 02/18/2021 2/18/21 prostate bx normal  1/25/24 MRI prostate PIRADS 2      Pulmonary nodule 1/2021 4 mm nodule. 01/06/2021 1/6/2021 CT abd/pelvis: 4 mm nodule in the right middle lobe      Chronic deep vein thrombosis (DVT) of popliteal vein of right lower extremity 9/21/20 outside US; unprovoked; anticoagulation 09/21/2020    History of fungal infection candida parasilosis hospitalization 8/2020 08/29/2020     -Found to have candidemia - source unclear  -infectious disease consulted, Started on micafungin and now converted to fluconazole  -Repeat cultures negative so far  -Dialysis line removed 8/28.   line replaced on 08/31 after line holiday.  -end date of fluconazole will be 09/11/2020.  Patient has ophthalmology follow-up scheduled on 09/08/2020. Tentative end date 9/11/2020 If no endophthalmitis. If noted to have endophthalmitis during optho visit, would need at least 4-6 weeks of treatment      Anemia in chronic kidney disease 08/26/2020    Type 2 diabetes mellitus with diabetic neuropathy, with long-term current use of insulin 05/10/2018    History of stroke 12/04/2017    CME (cystoid macular edema), bilateral 11/16/2017    Refractive error 11/16/2017    Senile nuclear sclerosis 10/05/2017    Right sided weakness 09/11/2017    Secondary hyperparathyroidism of renal origin 08/03/2017    Vitamin D deficiency 08/03/2017    Nuclear sclerosis, left 06/09/2017    Cortical cataract of both eyes 06/09/2017    DM type 2 without retinopathy 06/09/2017    Microcytosis 9/2019 labs c/w AoCD and AoCKD 03/22/2017     Seen on admission as well 2015; normal Hb, low MCV.  Normal RDW  08/17/2020 EGD normal esophagus.  Discolored nodular and texture change mucosa in the antrum.  Normal duodenum.  Path with foveolar hyperplasia and early xanthoma formation.  H pylori negative.  Mild chronic inflammation without acute activity      ESRD (end stage renal disease) 03/22/2017 2/27/20 renal US with  dopplers no ALF.  Medical renal disease  8/2020 renal US: 1. Symmetric diffusely increased cortical echogenicity and elevated resistive indices, nonspecific indicators of chronic medical renal disease.  2. Prostatomegaly.  Some of the provided images are suggestive of a distinct hyperechoic component of the prostate gland, of uncertain etiology or significance, and potentially artifactual.  Dedicated prostate ultrasound or MRI may be of benefit for further characterization.    Started on HD while hospitalized for progression to ESRD 8/2020  -Continue dialysis per nephrology  -Outpatient HD has been arranged  -Had planned discharge 8/27 if remained afebrile and cultures negative.  Unfortunately his blood culture grew Candida parapsilosis  -Dr. Gomez with ID consulted and case discussed with him.  Started micafungin 8/27 and now on fluconazole.  -Dialysis line removed after HD 8/28 and plan line holiday over weekend.  -new line by IR on 8/31, tolerated dialysis fluid.  -continue outpatient dialysis.      Benign essential HTN 03/21/2017 01/06/2021 TTE mild left atrial enlargement.  LV normal size and systolic function LVEF 55%.  Indeterminate diastolic function.  Mild right atrial enlargement.  Normal RV systolic function.  Normal RV size.  PA pressure 20.  Normal CVP.  Three.      Dyslipidemia 03/21/2017    BPH (benign prostatic hyperplasia) 2/18/21 prostate bx normal 03/21/2017 6/26/2017 renal US mod prostatomegaly.      Seizure disorder as sequela of cerebrovascular accident 03/21/2017    Hemiplegia and hemiparesis following cerebral infarction affecting right dominant side 03/21/2017      Consulted for wounds left heel, right buttock, right lower leg, right thigh, incision right lower  A 68 year old male admitted 5/10/24 from home with complaint of possible new infection in wound.   5/13 WBC 3.96 Hgb 8.3 Hct 25.9 Alb 1.8 Weight 169.4 lbs  3/20 A1C 5.8  On Isoflex mattress; Aiden score 15  Patient was  discharged home 4/19 with wound care to right foot, EHOB boots, and recommended CAMRON for hospital bed due to DTI buttock  S/P Irrigation and debridement right leg abscess and wound 4/4, 4/6, and 4/9.   When discharged home, recommended wound care to right leg incisions with Vashe moistened gauze and dry gauze to necrotic heel secured with Kerlix roll.     Right lateral foot (from Media 5/11)    Right plantar foot (from Media 5/11)    Left medial heel (from Media 5/11)  Assessment:  Photodocumentation    Buttocks- Healed;  scar    Left medial/posterior heel- Unstageable pressure injury with dry eschar.  Eschar 7 cm circular area.    Right lateral heel- Unstageable pressure injury eschar 3 cm mushy area with small amount drainage.    Right lateral ankle- Incisions from I&D granulating inward. Clean wounds with red base and small amount  yellow drainage.    Right plantar foot- Dry eschar 7 cm on lateral aspect of foot.   Mid foot- Mushy necrotic roof with purulent drainage.   Treatment:  Local wound care to right plantar mid foot and right lateral ankle with Vashe moistened gauze. Local wound care to dry eschar on right plantar foot and left heel with dry gauze.   Suspended both heels off bed with EHOB boot.   Nursing to change dressings to feet daily and continue Pressure Injury Prevention Interventions.   Recommendations made to primary team. Orders placed.     05/13/2024

## 2024-05-13 NOTE — ASSESSMENT & PLAN NOTE
-rec LOBO/toe pressures  -cont ASA and statin  -rec transition to Heparin drip - consult Heme to eval history of clotting disorder and safety of holding anticoagulation for RLE angiogram prior to BKA

## 2024-05-13 NOTE — TELEPHONE ENCOUNTER
Consult    Room 326 A    Dr Lim    evaluate for history of clotting disorder and risks of holding anticoagulation for surgery

## 2024-05-13 NOTE — PLAN OF CARE
Problem: Infection  Goal: Absence of Infection Signs and Symptoms  Intervention: Prevent or Manage Infection  Flowsheets (Taken 5/13/2024 0326)  Infection Management: aseptic technique maintained     Problem: Diabetes Comorbidity  Goal: Blood Glucose Level Within Targeted Range  Intervention: Monitor and Manage Glycemia  Flowsheets (Taken 5/13/2024 0326)  Glycemic Management: blood glucose monitored     Problem: Wound  Goal: Optimal Wound Healing  Intervention: Promote Wound Healing  Flowsheets (Taken 5/13/2024 0326)  Sleep/Rest Enhancement:   awakenings minimized   relaxation techniques promoted   room darkened

## 2024-05-13 NOTE — PROGRESS NOTES
Renal Progress Note    Date of Admission:  5/10/2024  2:04 PM    Length of Stay: 3  Days    Subjective: no new complaints    Objective:    Current Facility-Administered Medications   Medication    0.9%  NaCl infusion    acetaminophen tablet 650 mg    albuterol-ipratropium 2.5 mg-0.5 mg/3 mL nebulizer solution 3 mL    aluminum-magnesium hydroxide-simethicone 200-200-20 mg/5 mL suspension 30 mL    apixaban tablet 5 mg    aspirin EC tablet 81 mg    atorvastatin tablet 80 mg    bisacodyL suppository 10 mg    dextrose 10% bolus 125 mL 125 mL    dextrose 10% bolus 250 mL 250 mL    epoetin marisol-epbx injection 10,000 Units    finasteride tablet 5 mg    glucagon (human recombinant) injection 1 mg    glucose chewable tablet 16 g    glucose chewable tablet 24 g    insulin aspart U-100 pen 0-10 Units    labetaloL tablet 100 mg    melatonin tablet 6 mg    meropenem (MERREM) 500 mg in sodium chloride 0.9 % 100 mL IVPB (MB+)    mupirocin 2 % ointment    naloxone 0.4 mg/mL injection 0.02 mg    ondansetron injection 4 mg    oxyCODONE-acetaminophen 5-325 mg per tablet 1 tablet    polyethylene glycol packet 17 g    prochlorperazine injection Soln 5 mg    sevelamer carbonate tablet 800 mg    simethicone chewable tablet 80 mg    sodium chloride 0.9% bolus 250 mL 250 mL    sodium chloride 0.9% flush 10 mL    tamsulosin 24 hr capsule 0.8 mg    vancomycin - pharmacy to dose    vitamin renal formula (B-complex-vitamin c-folic acid) 1 mg per capsule 1 capsule       Vitals:    05/12/24 2344 05/13/24 0329 05/13/24 0724 05/13/24 0739   BP: 129/63 (!) 147/67  (!) 127/59   BP Location: Right arm Right arm  Right arm   Patient Position: Lying Lying  Lying   Pulse: 63 66 65 64   Resp: 18 18 17 18   Temp: 98.5 °F (36.9 °C) 98.5 °F (36.9 °C)  98.3 °F (36.8 °C)   TempSrc: Oral Oral  Oral   SpO2: 98% 97% 97% 98%   Weight:       Height:           I/O last 3 completed shifts:  In: 840 [P.O.:840]  Out: -   I/O this shift:  In: 240  "[P.O.:240]  Out: -       Physical Exam:     General: NAD  Neck: supple  Heart: RRR  Lungs: unlabored breathing   Abdomen: n/a  Limbs: n/a  Neurologic: awake-alert      Laboratories:    Recent Labs   Lab 05/13/24  0441   WBC 3.96   RBC 3.21*   HGB 8.3*   HCT 25.9*      MCV 81*   MCH 25.9*   MCHC 32.0       Recent Labs   Lab 05/13/24  0441   CALCIUM 8.2*   ALBUMIN 1.8*   PROT 5.2*      K 3.8   CO2 23      BUN 27*   CREATININE 7.5*   ALKPHOS 62   ALT <5*   AST 9*   BILITOT 0.3       No results for input(s): "COLORU", "CLARITYU", "SPECGRAV", "PHUR", "PROTEINUA", "GLUCOSEU", "BILIRUBINCON", "BLOODU", "WBCU", "RBCU", "BACTERIA", "MUCUS" in the last 24 hours.    Microbiology Results (last 7 days)       Procedure Component Value Units Date/Time    Aerobic culture (Specify Source) **CANNOT BE ORDERED AS STAT** [[id number]] Collected: 05/10/24 1703    Order Status: Completed Specimen: Abscess from Foot, Right Updated: 05/13/24 0530     Aerobic Bacterial Culture No growth    Blood culture [[id number]] Collected: 05/11/24 2006    Order Status: Completed Specimen: Blood from Peripheral, Hand, Right Updated: 05/12/24 2303     Blood Culture, Routine No Growth to date      No Growth to date    Narrative:      could not draw second set cultures, due to patient being a hard   stick. reported to nurse Gwen    Blood culture [[id number]] Collected: 05/11/24 1335    Order Status: Completed Specimen: Blood Updated: 05/12/24 1703     Blood Culture, Routine No Growth to date      No Growth to date    Blood culture #2 [[id number]]  (Abnormal) Collected: 05/10/24 1547    Order Status: Completed Specimen: Blood from Peripheral, Hand, Right Updated: 05/12/24 0726     Blood Culture, Routine Gram stain aer bottle: Gram positive cocci in clusters resembling Staph      Positive results previously called      COAGULASE-NEGATIVE STAPHYLOCOCCUS SPECIES  Organism is a probable contaminant      Narrative:      Blood Culture #2    " Blood culture #1 [[id number]]  (Abnormal) Collected: 05/10/24 4431    Order Status: Completed Specimen: Blood from Peripheral, Antecubital, Right Updated: 05/12/24 0701     Blood Culture, Routine Gram stain aer bottle: Gram positive cocci in clusters resembling Staph      Results called to and read back by: Gwen Staley 05/11/2024  09:56      COAGULASE-NEGATIVE STAPHYLOCOCCUS SPECIES  Organism is a probable contaminant      Narrative:      Blood Culture #1              Diagnostic Tests: n/a        Assessment:    67 y/o male with ESRD on HD admitted with:    - R-foot abscess with open wound and skin necrosis s/p debridements  - PAD: Atherosclerosis of native artery of right lower extremity with ulceration   - Paroxysmal atrial fibrillation with RVR   - Hyperkalemia RESOLVED  - DM-2 w/renal manifestations  - HTN  - Anemia of ckd  - 2nd. Hyperparathyroidism  - Hypoalbuminemia  - Hx. Of prior CVA with R-hemiplegia  - Seizure disorder due to above  - HLD        Plan:     - Considering amputation, Ortho following  - Vascular following awaiting RLE Angiogram  - Antibiotics (Vanco)  - Dialysis Q MWF  - UF as tolerated  - Epogen as needed  - Renal ADA diet + protein supplements  - Oral PO4- binders  - Apixaban for PAF as per Cardiology  - Glycemic control and other problems per admitting        Will follow on Dialysis days.

## 2024-05-13 NOTE — NURSING
[facility]  Nurses Note -- 4 Eyes      5/12/2024       Skin assessed on: Q Shift      [] No Pressure Injuries Present    [x]Prevention Measures Documented    [x] Yes LDA  for Pressure Injury Previously documented     [] Yes New Pressure Injury Discovered   [] LDA for New Pressure Injury Added      Attending RN:  Lopez Whitehead RN     Second RN:  DARRION Adhikari

## 2024-05-13 NOTE — SUBJECTIVE & OBJECTIVE
Medications Prior to Admission   Medication Sig Dispense Refill Last Dose    apixaban (ELIQUIS) 5 mg Tab Take 1 tablet (5 mg total) by mouth 2 (two) times daily. 180 tablet 3     aspirin (ECOTRIN) 81 MG EC tablet Take 1 tablet (81 mg total) by mouth once daily. 90 tablet 3     dextrose 5 % in water (D5W) PgBk 50 mL with ceFAZolin 1 gram SolR Ancef 2g/2g/3g after HD on HD days       ferrous gluconate (FERGON) 324 MG tablet Take 1 tablet (324 mg total) by mouth 2 (two) times daily with meals. 60 tablet 11     finasteride (PROSCAR) 5 mg tablet Take 1 tablet (5 mg total) by mouth once daily. 90 tablet 3     iron sucrose in NS (VENOFER) 100 mg/100 mL PgBk 50 mg.       labetaloL (NORMODYNE) 100 MG tablet Take 1 tablet (100 mg total) by mouth once daily. 90 tablet 3     methoxy peg-epoetin beta (MIRCERA INJ) 75 mcg.       mupirocin (BACTROBAN) 2 % ointment Apply to affected area 3 times daily 22 g 1     oxyCODONE-acetaminophen (PERCOCET)  mg per tablet Take 1 tablet by mouth every 6 (six) hours as needed for Pain. 10 tablet 0     RENVELA 800 mg Tab Take 1 tablet (800 mg total) by mouth 3 (three) times daily with meals. 90 tablet 0     tamsulosin (FLOMAX) 0.4 mg Cap Take 2 capsules (0.8 mg total) by mouth once daily. 180 capsule 3     vancomycin HCl (VANCOMYCIN 500 MG/100 ML D5W, READY TO MIX,) Vancomycin 500mg IV after HD on HD days (M/W/F)       vitamin renal formula, B-complex-vitamin c-folic acid, (NEPHROCAP) 1 mg Cap Take 1 capsule by mouth once daily. 30 capsule 11        Review of patient's allergies indicates:   Allergen Reactions    Ace inhibitors      Hyperkalemia 8/2018  Other reaction(s): Unknown    Penicillins Hives     Tolerated cefepime and cefdinir previously    Tizanidine      Felt hot       Past Medical History:   Diagnosis Date    Allergy     Clotting disorder     Elaquis and APlavix    Deep vein thrombosis     Diabetes mellitus, type 2     Hypertension     Nuclear sclerosis of both eyes 6/9/2017     Renal disorder     Seizures     Stroke     Urinary tract infection      Past Surgical History:   Procedure Laterality Date    CATARACT EXTRACTION W/  INTRAOCULAR LENS IMPLANT Right 10/05/2017    Dr. Tay    CATARACT EXTRACTION W/  INTRAOCULAR LENS IMPLANT Left 10/19/2017    Dr. Tay    CYSTOSCOPY W/ RETROGRADES Bilateral 2/18/2021    Procedure: CYSTOSCOPY, WITH RETROGRADE PYELOGRAM;  Surgeon: JEMMA Styles MD;  Location: [facility] OR;  Service: Urology;  Laterality: Bilateral;  REQUESTING EARLY AS POSSIBE-LO / 2/8/2021 @ 1:13PM  RN Pre Op 2-11-21, Covid NEGATIVE ON  2-17-21.  C A    ESOPHAGOGASTRODUODENOSCOPY N/A 8/17/2020    Procedure: EGD (ESOPHAGOGASTRODUODENOSCOPY);  Surgeon: Desmond Chapman MD;  Location: [facility];  Service: Endoscopy;  Laterality: N/A;    EYE SURGERY Bilateral     cataract    FEMORAL ARTERY STENT      FRACTURE SURGERY      INCISION AND DRAINAGE, LOWER EXTREMITY Right 4/4/2024    Procedure: INCISION AND DRAINAGE, LOWER EXTREMITY;  Surgeon: Jimbo Montilla III, MD;  Location: [facility] OR;  Service: Orthopedics;  Laterality: Right;    INCISION AND DRAINAGE, LOWER EXTREMITY Right 4/6/2024    Procedure: INCISION AND DRAINAGE, LOWER EXTREMITY;  Surgeon: Desmond Barillas MD;  Location: [facility] OR;  Service: Orthopedics;  Laterality: Right;  foot and ankle    INCISION AND DRAINAGE, LOWER EXTREMITY Right 4/9/2024    Procedure: INCISION AND DRAINAGE, LOWER EXTREMITY- FOOT & ANKLE;  Surgeon: Jimbo Montilla III, MD;  Location: [facility] OR;  Service: Orthopedics;  Laterality: Right;  CULTURES, VASCHE    KNEE SURGERY      bilateral scope    WOUND DRESSING Right 4/9/2024    Procedure: WOUND VAC EXCHANGE;  Surgeon: Jimbo Montilla III, MD;  Location: [facility] OR;  Service: Orthopedics;  Laterality: Right;     Family History       Problem Relation (Age of Onset)    Cataracts Mother    Diabetes Mother    Heart disease Mother    Hypertension Mother, Sister    No Known Problems Father, Brother, Maternal  Aunt, Maternal Uncle, Paternal Aunt, Paternal Uncle, Maternal Grandmother, Maternal Grandfather, Paternal Grandmother, Paternal Grandfather, Daughter, Other          Tobacco Use    Smoking status: Never    Smokeless tobacco: Never   Substance and Sexual Activity    Alcohol use: No    Drug use: No    Sexual activity: Not on file     Review of Systems   Constitutional:  Negative for chills.   HENT:  Negative for congestion.    Eyes:  Negative for visual disturbance.   Respiratory:  Negative for shortness of breath.    Cardiovascular:  Negative for chest pain.   Gastrointestinal:  Negative for abdominal distention.   Endocrine: Negative for cold intolerance.   Genitourinary:  Negative for flank pain.   Musculoskeletal:  Negative for back pain.   Skin:  Positive for color change and wound. Negative for pallor and rash.   Allergic/Immunologic: Negative for immunocompromised state.   Neurological:  Negative for dizziness.   Hematological:  Does not bruise/bleed easily.   Psychiatric/Behavioral:  Negative for agitation.      Objective:     Vital Signs (Most Recent):  Temp: 98.3 °F (36.8 °C) (05/13/24 0739)  Pulse: 64 (05/13/24 0739)  Resp: 18 (05/13/24 0739)  BP: (!) 127/59 (05/13/24 0739)  SpO2: 98 % (05/13/24 0739) Vital Signs (24h Range):  Temp:  [97.6 °F (36.4 °C)-98.5 °F (36.9 °C)] 98.3 °F (36.8 °C)  Pulse:  [61-67] 64  Resp:  [17-20] 18  SpO2:  [97 %-100 %] 98 %  BP: (127-180)/(59-77) 127/59     Weight: 77 kg (169 lb 12.1 oz)  Body mass index is 25.81 kg/m².      Physical Exam  Vitals reviewed.   Constitutional:       General: He is not in acute distress.     Appearance: He is well-developed. He is not diaphoretic.   HENT:      Head: Normocephalic and atraumatic.   Eyes:      Conjunctiva/sclera: Conjunctivae normal.   Cardiovascular:      Rate and Rhythm: Normal rate.      Pulses:           Femoral pulses are 2+ on the right side and 2+ on the left side.       Popliteal pulses are 0 on the right side.        Dorsalis  pedis pulses are detected w/ Doppler on the right side.   Pulmonary:      Effort: Pulmonary effort is normal.   Abdominal:      General: There is no distension.      Palpations: Abdomen is soft. There is no mass.      Tenderness: There is no abdominal tenderness. There is no guarding or rebound.      Hernia: No hernia is present.   Musculoskeletal:         General: No deformity. Normal range of motion.      Cervical back: Neck supple.   Feet:      Comments: Right foot wounds with dressing in place  Skin:     Findings: No rash.   Neurological:      Mental Status: He is alert and oriented to person, place, and time.          Significant Labs:  All pertinent labs from the last 24 hours have been reviewed.    Significant Diagnostics:  I have reviewed and interpreted all pertinent imaging results/findings within the past 24 hours.

## 2024-05-13 NOTE — CONSULTS
Hematology- Oncology Consult Note :     5/13/2024    Reason for consult: hx of DVT      HPI    Pt is a 68 y.o. male with pmhx of hypertension, BPH, chronic right lower extremity DVT, DM2, dyslipidemia, ESRD on HD, hemiplegia and hemiparesis following CVA, paroxysmal AFib, seizure disorder who presents to  WED after being sent by wound care for new wound infection. Of note pt hospitalized in March for cellulitis and abscess to the right foot with surgical intervention at that time. In the ED today, CRP and procalcitonin elevated. Blood and wound cultures were obtained and started on IV antibiotics.  Hematology service consulted for anticoag recs.        Active Problem List with Overview Notes    Diagnosis Date Noted    Cellulitis of right foot 05/13/2024    Atherosclerosis of native artery of right lower extremity with ulceration 05/13/2024    Open wound of right foot 05/10/2024    Peripheral artery disease 04/25/2024     3/20/24 RLE arterial US Arterial vasculature of the right lower extremity is patent.  However, there is evidence of atherosclerotic change with stenosis at the level of popliteal artery.       Abdominal aortic atherosclerosis on CT 4/1/24 04/25/2024    Open wound 03/26/2024    Paroxysmal atrial fibrillation 03/20/2024     3/20/24 TTE LV normal systolic function LVEF 55-60%.  Converted to sinus rhythm on amiodarone   Amiodarone stopped on hospital discharge due to possible contributor to transaminitis      Bilateral posterior capsular opacification 05/02/2023    Pseudophakia 01/06/2022    History of diabetic ulcer of foot     Elevated PSA 2/18/21 prostate bx normal 02/18/2021 2/18/21 prostate bx normal  1/25/24 MRI prostate PIRADS 2      Pulmonary nodule 1/2021 4 mm nodule. 01/06/2021 1/6/2021 CT abd/pelvis: 4 mm nodule in the right middle lobe      Chronic deep vein thrombosis (DVT) of popliteal vein of right lower extremity 9/21/20 outside US; unprovoked; anticoagulation 09/21/2020     History of fungal infection candida parasilosis hospitalization 8/2020 08/29/2020     -Found to have candidemia - source unclear  -infectious disease consulted, Started on micafungin and now converted to fluconazole  -Repeat cultures negative so far  -Dialysis line removed 8/28.   line replaced on 08/31 after line holiday.  -end date of fluconazole will be 09/11/2020.  Patient has ophthalmology follow-up scheduled on 09/08/2020. Tentative end date 9/11/2020 If no endophthalmitis. If noted to have endophthalmitis during optho visit, would need at least 4-6 weeks of treatment      Anemia in chronic kidney disease 08/26/2020    Type 2 diabetes mellitus with diabetic neuropathy, with long-term current use of insulin 05/10/2018    History of stroke 12/04/2017    CME (cystoid macular edema), bilateral 11/16/2017    Refractive error 11/16/2017    Senile nuclear sclerosis 10/05/2017    Right sided weakness 09/11/2017    Secondary hyperparathyroidism of renal origin 08/03/2017    Vitamin D deficiency 08/03/2017    Nuclear sclerosis, left 06/09/2017    Cortical cataract of both eyes 06/09/2017    DM type 2 without retinopathy 06/09/2017    Microcytosis 9/2019 labs c/w AoCD and AoCKD 03/22/2017     Seen on admission as well 2015; normal Hb, low MCV.  Normal RDW  08/17/2020 EGD normal esophagus.  Discolored nodular and texture change mucosa in the antrum.  Normal duodenum.  Path with foveolar hyperplasia and early xanthoma formation.  H pylori negative.  Mild chronic inflammation without acute activity      ESRD (end stage renal disease) 03/22/2017 2/27/20 renal US with dopplers no ALF.  Medical renal disease  8/2020 renal US: 1. Symmetric diffusely increased cortical echogenicity and elevated resistive indices, nonspecific indicators of chronic medical renal disease.  2. Prostatomegaly.  Some of the provided images are suggestive of a distinct hyperechoic component of the prostate gland, of uncertain etiology or  significance, and potentially artifactual.  Dedicated prostate ultrasound or MRI may be of benefit for further characterization.    Started on HD while hospitalized for progression to ESRD 8/2020  -Continue dialysis per nephrology  -Outpatient HD has been arranged  -Had planned discharge 8/27 if remained afebrile and cultures negative.  Unfortunately his blood culture grew Candida parapsilosis  -Dr. Gomez with ID consulted and case discussed with him.  Started micafungin 8/27 and now on fluconazole.  -Dialysis line removed after HD 8/28 and plan line holiday over weekend.  -new line by IR on 8/31, tolerated dialysis fluid.  -continue outpatient dialysis.      Benign essential HTN 03/21/2017 01/06/2021 TTE mild left atrial enlargement.  LV normal size and systolic function LVEF 55%.  Indeterminate diastolic function.  Mild right atrial enlargement.  Normal RV systolic function.  Normal RV size.  PA pressure 20.  Normal CVP.  Three.      Dyslipidemia 03/21/2017    BPH (benign prostatic hyperplasia) 2/18/21 prostate bx normal 03/21/2017 6/26/2017 renal US mod prostatomegaly.      Seizure disorder as sequela of cerebrovascular accident 03/21/2017    Hemiplegia and hemiparesis following cerebral infarction affecting right dominant side 03/21/2017       Patient Active Problem List    Diagnosis Date Noted    Cellulitis of right foot 05/13/2024    Atherosclerosis of native artery of right lower extremity with ulceration 05/13/2024    Open wound of right foot 05/10/2024    Peripheral artery disease 04/25/2024    Abdominal aortic atherosclerosis on CT 4/1/24 04/25/2024    Open wound 03/26/2024    Paroxysmal atrial fibrillation 03/20/2024    Bilateral posterior capsular opacification 05/02/2023    Pseudophakia 01/06/2022    History of diabetic ulcer of foot     Elevated PSA 2/18/21 prostate bx normal 02/18/2021    Pulmonary nodule 1/2021 4 mm nodule. 01/06/2021    Chronic deep vein thrombosis (DVT) of popliteal vein  of right lower extremity 9/21/20 outside US; unprovoked; anticoagulation 09/21/2020    History of fungal infection candida parasilosis hospitalization 8/2020 08/29/2020    Anemia in chronic kidney disease 08/26/2020    Type 2 diabetes mellitus with diabetic neuropathy, with long-term current use of insulin 05/10/2018    History of stroke 12/04/2017    CME (cystoid macular edema), bilateral 11/16/2017    Refractive error 11/16/2017    Senile nuclear sclerosis 10/05/2017    Right sided weakness 09/11/2017    Secondary hyperparathyroidism of renal origin 08/03/2017    Vitamin D deficiency 08/03/2017    Nuclear sclerosis, left 06/09/2017    Cortical cataract of both eyes 06/09/2017    DM type 2 without retinopathy 06/09/2017    Microcytosis 9/2019 labs c/w AoCD and AoCKD 03/22/2017    ESRD (end stage renal disease) 03/22/2017    Benign essential HTN 03/21/2017    Dyslipidemia 03/21/2017    BPH (benign prostatic hyperplasia) 2/18/21 prostate bx normal 03/21/2017    Seizure disorder as sequela of cerebrovascular accident 03/21/2017    Hemiplegia and hemiparesis following cerebral infarction affecting right dominant side 03/21/2017     Past Medical History:   Diagnosis Date    Allergy     Clotting disorder     Elaquis and APlavix    Deep vein thrombosis     Diabetes mellitus, type 2     Hypertension     Nuclear sclerosis of both eyes 6/9/2017    Renal disorder     Seizures     Stroke     Urinary tract infection       Past Surgical History:   Procedure Laterality Date    CATARACT EXTRACTION W/  INTRAOCULAR LENS IMPLANT Right 10/05/2017    Dr. Tay    CATARACT EXTRACTION W/  INTRAOCULAR LENS IMPLANT Left 10/19/2017    Dr. Tay    CYSTOSCOPY W/ RETROGRADES Bilateral 2/18/2021    Procedure: CYSTOSCOPY, WITH RETROGRADE PYELOGRAM;  Surgeon: JEMMA Styles MD;  Location: [facility];  Service: Urology;  Laterality: Bilateral;  REQUESTING EARLY AS POSSIBE-LO / 2/8/2021 @ 1:13PM  RN Pre Op 2-11-21, Covid NEGATIVE ON   2-17-21.  C A    ESOPHAGOGASTRODUODENOSCOPY N/A 8/17/2020    Procedure: EGD (ESOPHAGOGASTRODUODENOSCOPY);  Surgeon: Desmond Chapman MD;  Location: [facility] ENDO;  Service: Endoscopy;  Laterality: N/A;    EYE SURGERY Bilateral     cataract    FEMORAL ARTERY STENT      FRACTURE SURGERY      INCISION AND DRAINAGE, LOWER EXTREMITY Right 4/4/2024    Procedure: INCISION AND DRAINAGE, LOWER EXTREMITY;  Surgeon: Jimbo Montilla III, MD;  Location: [facility] OR;  Service: Orthopedics;  Laterality: Right;    INCISION AND DRAINAGE, LOWER EXTREMITY Right 4/6/2024    Procedure: INCISION AND DRAINAGE, LOWER EXTREMITY;  Surgeon: Desmond Barillas MD;  Location: [facility] OR;  Service: Orthopedics;  Laterality: Right;  foot and ankle    INCISION AND DRAINAGE, LOWER EXTREMITY Right 4/9/2024    Procedure: INCISION AND DRAINAGE, LOWER EXTREMITY- FOOT & ANKLE;  Surgeon: Jimbo Montilla III, MD;  Location: [facility] OR;  Service: Orthopedics;  Laterality: Right;  CULTURES, VASCHE    KNEE SURGERY      bilateral scope    WOUND DRESSING Right 4/9/2024    Procedure: WOUND VAC EXCHANGE;  Surgeon: Jimbo Montilla III, MD;  Location: [facility] OR;  Service: Orthopedics;  Laterality: Right;      Medications Prior to Admission   Medication Sig Dispense Refill Last Dose    apixaban (ELIQUIS) 5 mg Tab Take 1 tablet (5 mg total) by mouth 2 (two) times daily. 180 tablet 3     aspirin (ECOTRIN) 81 MG EC tablet Take 1 tablet (81 mg total) by mouth once daily. 90 tablet 3     dextrose 5 % in water (D5W) PgBk 50 mL with ceFAZolin 1 gram SolR Ancef 2g/2g/3g after HD on HD days       ferrous gluconate (FERGON) 324 MG tablet Take 1 tablet (324 mg total) by mouth 2 (two) times daily with meals. 60 tablet 11     finasteride (PROSCAR) 5 mg tablet Take 1 tablet (5 mg total) by mouth once daily. 90 tablet 3     iron sucrose in NS (VENOFER) 100 mg/100 mL PgBk 50 mg.       labetaloL (NORMODYNE) 100 MG tablet Take 1 tablet (100 mg total) by mouth once daily. 90 tablet 3      methoxy peg-epoetin beta (MIRCERA INJ) 75 mcg.       mupirocin (BACTROBAN) 2 % ointment Apply to affected area 3 times daily 22 g 1     oxyCODONE-acetaminophen (PERCOCET)  mg per tablet Take 1 tablet by mouth every 6 (six) hours as needed for Pain. 10 tablet 0     RENVELA 800 mg Tab Take 1 tablet (800 mg total) by mouth 3 (three) times daily with meals. 90 tablet 0     tamsulosin (FLOMAX) 0.4 mg Cap Take 2 capsules (0.8 mg total) by mouth once daily. 180 capsule 3     vancomycin HCl (VANCOMYCIN 500 MG/100 ML D5W, READY TO MIX,) Vancomycin 500mg IV after HD on HD days (M/W/F)       vitamin renal formula, B-complex-vitamin c-folic acid, (NEPHROCAP) 1 mg Cap Take 1 capsule by mouth once daily. 30 capsule 11      Review of patient's allergies indicates:   Allergen Reactions    Ace inhibitors      Hyperkalemia 8/2018  Other reaction(s): Unknown    Penicillins Hives     Tolerated cefepime and cefdinir previously    Tizanidine      Felt hot      Social History     Tobacco Use    Smoking status: Never    Smokeless tobacco: Never   Substance Use Topics    Alcohol use: No      Family History   Problem Relation Name Age of Onset    Diabetes Mother      Heart disease Mother      Hypertension Mother      Cataracts Mother      No Known Problems Father      Hypertension Sister      No Known Problems Brother      No Known Problems Maternal Aunt      No Known Problems Maternal Uncle      No Known Problems Paternal Aunt      No Known Problems Paternal Uncle      No Known Problems Maternal Grandmother      No Known Problems Maternal Grandfather      No Known Problems Paternal Grandmother      No Known Problems Paternal Grandfather      No Known Problems Daughter      No Known Problems Other      Amblyopia Neg Hx      Blindness Neg Hx      Cancer Neg Hx      Glaucoma Neg Hx      Macular degeneration Neg Hx      Retinal detachment Neg Hx      Strabismus Neg Hx      Stroke Neg Hx      Thyroid disease Neg Hx          Review of  Systems :  Review of Systems   Constitutional:  Positive for malaise/fatigue. Negative for chills and fever.   HENT:  Negative for congestion, hearing loss and nosebleeds.    Eyes:  Negative for blurred vision.   Respiratory:  Negative for cough, hemoptysis, sputum production, shortness of breath and wheezing.    Cardiovascular:  Negative for chest pain, palpitations, orthopnea, claudication and leg swelling.   Gastrointestinal:  Negative for blood in stool, constipation, diarrhea, heartburn, melena and vomiting.   Genitourinary:  Negative for dysuria and hematuria.   Musculoskeletal:  Negative for joint pain and myalgias.   Skin:  Negative for itching and rash.   Neurological:  Negative for dizziness, sensory change and speech change.   Endo/Heme/Allergies:  Does not bruise/bleed easily.   Psychiatric/Behavioral:  Negative for depression. The patient is not nervous/anxious.        Physical Exam :  Wt Readings from Last 3 Encounters:   05/10/24 77 kg (169 lb 12.1 oz)   04/02/24 90.4 kg (199 lb 4.7 oz)   03/07/24 79.8 kg (175 lb 14.8 oz)     Temp Readings from Last 3 Encounters:   05/13/24 98.3 °F (36.8 °C) (Oral)   04/19/24 98.3 °F (36.8 °C) (Oral)   02/06/24 98 °F (36.7 °C) (Oral)     BP Readings from Last 3 Encounters:   05/13/24 (!) 127/59   04/19/24 (!) 138/53   03/07/24 129/65     Pulse Readings from Last 3 Encounters:   05/13/24 64   04/19/24 79   02/06/24 80     Body mass index is 25.81 kg/m².    Physical Exam  Vitals reviewed.   Constitutional:       Comments: Resting comfortably receiving HD   HENT:      Head: Normocephalic and atraumatic.      Nose: Nose normal.      Mouth/Throat:      Mouth: Mucous membranes are moist.   Eyes:      Pupils: Pupils are equal, round, and reactive to light.   Cardiovascular:      Rate and Rhythm: Regular rhythm.      Heart sounds: Normal heart sounds.   Pulmonary:      Effort: Pulmonary effort is normal.      Breath sounds: Normal breath sounds.   Abdominal:      General:  Abdomen is flat.   Musculoskeletal:         General: Normal range of motion.      Cervical back: Normal range of motion and neck supple.   Skin:     General: Skin is warm and dry.      Comments: CAITLIN METZGER  R foot bandaged   Neurological:      Mental Status: He is alert. Mental status is at baseline.   Psychiatric:         Mood and Affect: Mood normal.         Behavior: Behavior normal.           Pertinent Diagnostic studies:    Recent Results (from the past 24 hour(s))   POCT glucose    Collection Time: 05/12/24  4:39 PM   Result Value Ref Range    POCT Glucose 141 (H) 70 - 110 mg/dL   POCT glucose    Collection Time: 05/12/24  8:14 PM   Result Value Ref Range    POCT Glucose 106 70 - 110 mg/dL   CBC with Automated Differential    Collection Time: 05/13/24  4:41 AM   Result Value Ref Range    WBC 3.96 3.90 - 12.70 K/uL    RBC 3.21 (L) 4.60 - 6.20 M/uL    Hemoglobin 8.3 (L) 14.0 - 18.0 g/dL    Hematocrit 25.9 (L) 40.0 - 54.0 %    MCV 81 (L) 82 - 98 fL    MCH 25.9 (L) 27.0 - 31.0 pg    MCHC 32.0 32.0 - 36.0 g/dL    RDW 17.1 (H) 11.5 - 14.5 %    Platelets 181 150 - 450 K/uL    MPV 9.1 (L) 9.2 - 12.9 fL    Immature Granulocytes 0.3 0.0 - 0.5 %    Gran # (ANC) 2.4 1.8 - 7.7 K/uL    Immature Grans (Abs) 0.01 0.00 - 0.04 K/uL    Lymph # 1.0 1.0 - 4.8 K/uL    Mono # 0.3 0.3 - 1.0 K/uL    Eos # 0.2 0.0 - 0.5 K/uL    Baso # 0.03 0.00 - 0.20 K/uL    nRBC 0 0 /100 WBC    Gran % 60.5 38.0 - 73.0 %    Lymph % 24.0 18.0 - 48.0 %    Mono % 8.6 4.0 - 15.0 %    Eosinophil % 5.8 0.0 - 8.0 %    Basophil % 0.8 0.0 - 1.9 %    Differential Method Automated    Comprehensive Metabolic Panel (CMP)    Collection Time: 05/13/24  4:41 AM   Result Value Ref Range    Sodium 138 136 - 145 mmol/L    Potassium 3.8 3.5 - 5.1 mmol/L    Chloride 101 95 - 110 mmol/L    CO2 23 23 - 29 mmol/L    Glucose 112 (H) 70 - 110 mg/dL    BUN 27 (H) 8 - 23 mg/dL    Creatinine 7.5 (H) 0.5 - 1.4 mg/dL    Calcium 8.2 (L) 8.7 - 10.5 mg/dL    Total Protein 5.2 (L) 6.0 -  8.4 g/dL    Albumin 1.8 (L) 3.5 - 5.2 g/dL    Total Bilirubin 0.3 0.1 - 1.0 mg/dL    Alkaline Phosphatase 62 55 - 135 U/L    AST 9 (L) 10 - 40 U/L    ALT <5 (L) 10 - 44 U/L    eGFR 7 (A) >60 mL/min/1.73 m^2    Anion Gap 14 8 - 16 mmol/L   Phosphorus    Collection Time: 05/13/24  4:41 AM   Result Value Ref Range    Phosphorus 5.4 (H) 2.7 - 4.5 mg/dL   Vancomycin, Random    Collection Time: 05/13/24  4:41 AM   Result Value Ref Range    Vancomycin, Random 17.3 Not established ug/mL   POCT glucose    Collection Time: 05/13/24  7:41 AM   Result Value Ref Range    POCT Glucose 113 (H) 70 - 110 mg/dL       Assessment/Plan :     Hx of DVT  -Per pt in 2020 in RLE  -No fmhx or hx of recurrent clots since starting eliquis per pt  -Appears unprovoked per records but could be secondary to co morbidities  -No hypercoag testing but will get factor v and prothombin mutation testing for now  -Pt has tolerated eliquis well so far and denied any complications on the medication  -Vascular surgery evaluating pt for RLE angiogram and possible BKA  -Ok from a hematology standpoint to continue perioperative hep drip and hold eliquis for now  -Due need for infection source control ok from heme perspective to hold anticoagulation for procedure  -Can restart eliquis post op once bleeding risk acceptable per surgery       Open wound of right foot/Hx of PAD  -Worsening R foot wound  -Following with wound care  -On IV antibiotics  -Per primary team and vascular surgery         Paroxysmal atrial fibrillation  -On eliquis       Anemia in chronic kidney disease  -Patient's anemia is currently controlled.   -Transfuse pRBC PRN Hb <7       Type 2 diabetes mellitus with diabetic neuropathy, with long-term current use of insulin  -stable, per primary team       Secondary hyperparathyroidism of renal origin  -On sevelamer  -Per nephrology        ESRD (end stage renal disease)  -Receives HD MWF via LUE AVF. Has not missed any sessions.   -Per nephrology         Hemiplegia and hemiparesis following cerebral infarction affecting right dominant side  -At baseline per pt       BPH (benign prostatic hyperplasia) 2/18/21 prostate bx normal  -stable on tamsulosin and finasteride       Benign essential HTN  -stable, per primary team        Time spent on case: 30 minutes       Thank you for the consult, please contact us for any questions or concerns.    Joshua Mendoza MD   Hematology/oncology, [facility]

## 2024-05-13 NOTE — PROGRESS NOTES
[facility] [place] 4 [place]      5/13/2024       Skin assessed on: Q Shift      [] No Pressure Injuries Present    []Prevention Measures Documented    [x] Yes LDA  for Pressure Injury Previously documented     [] Yes New Pressure Injury Discovered   [] LDA for New Pressure Injury Added      Attending RN:  Mike Flores RN     Second RN:  Lopez Whitehead RN

## 2024-05-14 ENCOUNTER — TELEPHONE (OUTPATIENT)
Dept: HEMATOLOGY/ONCOLOGY | Facility: CLINIC | Age: 69
End: 2024-05-14
Payer: MEDICARE

## 2024-05-14 LAB
APTT PPP: 37 SEC (ref 21–32)
APTT PPP: >150 SEC (ref 21–32)
BACTERIA SPEC AEROBE CULT: NO GROWTH
BASOPHILS # BLD AUTO: 0.03 K/UL (ref 0–0.2)
BASOPHILS # BLD AUTO: 0.04 K/UL (ref 0–0.2)
BASOPHILS NFR BLD: 0.6 % (ref 0–1.9)
BASOPHILS NFR BLD: 0.9 % (ref 0–1.9)
DIFFERENTIAL METHOD BLD: ABNORMAL
DIFFERENTIAL METHOD BLD: ABNORMAL
EOSINOPHIL # BLD AUTO: 0.2 K/UL (ref 0–0.5)
EOSINOPHIL # BLD AUTO: 0.2 K/UL (ref 0–0.5)
EOSINOPHIL NFR BLD: 3.3 % (ref 0–8)
EOSINOPHIL NFR BLD: 4.1 % (ref 0–8)
ERYTHROCYTE [DISTWIDTH] IN BLOOD BY AUTOMATED COUNT: 17.3 % (ref 11.5–14.5)
ERYTHROCYTE [DISTWIDTH] IN BLOOD BY AUTOMATED COUNT: 17.5 % (ref 11.5–14.5)
HCT VFR BLD AUTO: 24.8 % (ref 40–54)
HCT VFR BLD AUTO: 27.6 % (ref 40–54)
HGB BLD-MCNC: 7.7 G/DL (ref 14–18)
HGB BLD-MCNC: 8.8 G/DL (ref 14–18)
IMM GRANULOCYTES # BLD AUTO: 0.01 K/UL (ref 0–0.04)
IMM GRANULOCYTES # BLD AUTO: 0.03 K/UL (ref 0–0.04)
IMM GRANULOCYTES NFR BLD AUTO: 0.2 % (ref 0–0.5)
IMM GRANULOCYTES NFR BLD AUTO: 0.7 % (ref 0–0.5)
INR PPP: 1.1 (ref 0.8–1.2)
LEFT ABI: 0.74
LEFT DORSALIS PEDIS: 110 MMHG
LEFT POSTERIOR TIBIAL: 70 MMHG
LEFT TBI: 0.13
LEFT TOE PRESSURE: 19 MMHG
LYMPHOCYTES # BLD AUTO: 1.1 K/UL (ref 1–4.8)
LYMPHOCYTES # BLD AUTO: 1.2 K/UL (ref 1–4.8)
LYMPHOCYTES NFR BLD: 21.4 % (ref 18–48)
LYMPHOCYTES NFR BLD: 26.4 % (ref 18–48)
MCH RBC QN AUTO: 25.3 PG (ref 27–31)
MCH RBC QN AUTO: 26.1 PG (ref 27–31)
MCHC RBC AUTO-ENTMCNC: 31 G/DL (ref 32–36)
MCHC RBC AUTO-ENTMCNC: 31.9 G/DL (ref 32–36)
MCV RBC AUTO: 82 FL (ref 82–98)
MCV RBC AUTO: 82 FL (ref 82–98)
MONOCYTES # BLD AUTO: 0.3 K/UL (ref 0.3–1)
MONOCYTES # BLD AUTO: 0.5 K/UL (ref 0.3–1)
MONOCYTES NFR BLD: 10 % (ref 4–15)
MONOCYTES NFR BLD: 7.8 % (ref 4–15)
NEUTROPHILS # BLD AUTO: 2.6 K/UL (ref 1.8–7.7)
NEUTROPHILS # BLD AUTO: 3.2 K/UL (ref 1.8–7.7)
NEUTROPHILS NFR BLD: 60.1 % (ref 38–73)
NEUTROPHILS NFR BLD: 64.5 % (ref 38–73)
NRBC BLD-RTO: 0 /100 WBC
NRBC BLD-RTO: 0 /100 WBC
PLATELET # BLD AUTO: 138 K/UL (ref 150–450)
PLATELET # BLD AUTO: 179 K/UL (ref 150–450)
PMV BLD AUTO: 8.8 FL (ref 9.2–12.9)
PMV BLD AUTO: 9.1 FL (ref 9.2–12.9)
POCT GLUCOSE: 92 MG/DL (ref 70–110)
POCT GLUCOSE: 95 MG/DL (ref 70–110)
POCT GLUCOSE: 96 MG/DL (ref 70–110)
PROTHROMBIN TIME: 12.3 SEC (ref 9–12.5)
RBC # BLD AUTO: 3.04 M/UL (ref 4.6–6.2)
RBC # BLD AUTO: 3.37 M/UL (ref 4.6–6.2)
RIGHT ABI: 1.71
RIGHT ARM BP: 149 MMHG
RIGHT DORSALIS PEDIS: 255 MMHG
RIGHT POSTERIOR TIBIAL: 255 MMHG
RIGHT TBI: 0.38
RIGHT TOE PRESSURE: 57 MMHG
WBC # BLD AUTO: 4.36 K/UL (ref 3.9–12.7)
WBC # BLD AUTO: 4.91 K/UL (ref 3.9–12.7)

## 2024-05-14 PROCEDURE — 36415 COLL VENOUS BLD VENIPUNCTURE: CPT | Mod: HCNC,XB | Performed by: STUDENT IN AN ORGANIZED HEALTH CARE EDUCATION/TRAINING PROGRAM

## 2024-05-14 PROCEDURE — 25000003 PHARM REV CODE 250: Mod: HCNC | Performed by: EMERGENCY MEDICINE

## 2024-05-14 PROCEDURE — 63600175 PHARM REV CODE 636 W HCPCS: Mod: HCNC | Performed by: STUDENT IN AN ORGANIZED HEALTH CARE EDUCATION/TRAINING PROGRAM

## 2024-05-14 PROCEDURE — 63600175 PHARM REV CODE 636 W HCPCS: Mod: HCNC | Performed by: EMERGENCY MEDICINE

## 2024-05-14 PROCEDURE — 81241 F5 GENE: CPT | Mod: HCNC | Performed by: STUDENT IN AN ORGANIZED HEALTH CARE EDUCATION/TRAINING PROGRAM

## 2024-05-14 PROCEDURE — 85610 PROTHROMBIN TIME: CPT | Mod: HCNC | Performed by: STUDENT IN AN ORGANIZED HEALTH CARE EDUCATION/TRAINING PROGRAM

## 2024-05-14 PROCEDURE — 36415 COLL VENOUS BLD VENIPUNCTURE: CPT | Mod: HCNC | Performed by: STUDENT IN AN ORGANIZED HEALTH CARE EDUCATION/TRAINING PROGRAM

## 2024-05-14 PROCEDURE — 11000001 HC ACUTE MED/SURG PRIVATE ROOM: Mod: HCNC

## 2024-05-14 PROCEDURE — 81240 F2 GENE: CPT | Mod: HCNC | Performed by: STUDENT IN AN ORGANIZED HEALTH CARE EDUCATION/TRAINING PROGRAM

## 2024-05-14 PROCEDURE — 25000003 PHARM REV CODE 250: Mod: HCNC | Performed by: HOSPITALIST

## 2024-05-14 PROCEDURE — 85730 THROMBOPLASTIN TIME PARTIAL: CPT | Mod: HCNC | Performed by: STUDENT IN AN ORGANIZED HEALTH CARE EDUCATION/TRAINING PROGRAM

## 2024-05-14 PROCEDURE — 85025 COMPLETE CBC W/AUTO DIFF WBC: CPT | Mod: HCNC | Performed by: STUDENT IN AN ORGANIZED HEALTH CARE EDUCATION/TRAINING PROGRAM

## 2024-05-14 PROCEDURE — 84100 ASSAY OF PHOSPHORUS: CPT | Mod: HCNC | Performed by: STUDENT IN AN ORGANIZED HEALTH CARE EDUCATION/TRAINING PROGRAM

## 2024-05-14 PROCEDURE — 85025 COMPLETE CBC W/AUTO DIFF WBC: CPT | Mod: 91,HCNC | Performed by: STUDENT IN AN ORGANIZED HEALTH CARE EDUCATION/TRAINING PROGRAM

## 2024-05-14 PROCEDURE — 85730 THROMBOPLASTIN TIME PARTIAL: CPT | Mod: 91,HCNC | Performed by: STUDENT IN AN ORGANIZED HEALTH CARE EDUCATION/TRAINING PROGRAM

## 2024-05-14 RX ORDER — HEPARIN SODIUM,PORCINE/D5W 25000/250
0-40 INTRAVENOUS SOLUTION INTRAVENOUS CONTINUOUS
Status: DISCONTINUED | OUTPATIENT
Start: 2024-05-14 | End: 2024-05-23

## 2024-05-14 RX ADMIN — POLYETHYLENE GLYCOL 3350 17 G: 17 POWDER, FOR SOLUTION ORAL at 09:05

## 2024-05-14 RX ADMIN — ASPIRIN 81 MG: 81 TABLET, COATED ORAL at 11:05

## 2024-05-14 RX ADMIN — Medication 1 CAPSULE: at 11:05

## 2024-05-14 RX ADMIN — HEPARIN SODIUM 14 UNITS/KG/HR: 10000 INJECTION, SOLUTION INTRAVENOUS at 05:05

## 2024-05-14 RX ADMIN — MUPIROCIN: 20 OINTMENT TOPICAL at 09:05

## 2024-05-14 RX ADMIN — LABETALOL HYDROCHLORIDE 100 MG: 100 TABLET, FILM COATED ORAL at 11:05

## 2024-05-14 RX ADMIN — TAMSULOSIN HYDROCHLORIDE 0.8 MG: 0.4 CAPSULE ORAL at 11:05

## 2024-05-14 RX ADMIN — FINASTERIDE 5 MG: 5 TABLET, FILM COATED ORAL at 11:05

## 2024-05-14 RX ADMIN — MEROPENEM 500 MG: 500 INJECTION INTRAVENOUS at 09:05

## 2024-05-14 RX ADMIN — ATORVASTATIN CALCIUM 80 MG: 40 TABLET, FILM COATED ORAL at 11:05

## 2024-05-14 NOTE — PROGRESS NOTES
[facility], [facility] [place]  Nurses Note -- 4 Eyes      5/14/2024       Skin assessed on: Q Shift      [] No Pressure Injuries Present    []Prevention Measures Documented    [x] Yes LDA  for Pressure Injury Previously documented     [] Yes New Pressure Injury Discovered   [] LDA for New Pressure Injury Added      Attending RN:  Mike Flores RN     Second RN:  Gilma Nicole RN

## 2024-05-14 NOTE — TELEPHONE ENCOUNTER
Room 326 A    [facility] [email or web address]] [place]    Stop Heparin for a procedure

## 2024-05-14 NOTE — ASSESSMENT & PLAN NOTE
Chronic, controlled. Latest blood pressure and vitals reviewed-     Temp:  [97.6 °F (36.4 °C)-98.5 °F (36.9 °C)]   Pulse:  [64-81]   Resp:  [17-18]   BP: (137-173)/(64-76)   SpO2:  [96 %-100 %] .   Home meds for hypertension were reviewed and noted below.   Hypertension Medications               labetaloL (NORMODYNE) 100 MG tablet Take 1 tablet (100 mg total) by mouth once daily.            While in the hospital, will manage blood pressure as follows; Continue home antihypertensive regimen    Will utilize p.r.n. blood pressure medication only if patient's blood pressure greater than 180/110 and he develops symptoms such as worsening chest pain or shortness of breath.

## 2024-05-14 NOTE — NURSING
[facility], [facility] [place]  Nurses Note -- 4 Eyes      5/13/2024       Skin assessed on: Q Shift      [] No Pressure Injuries Present    []Prevention Measures Documented    [x] Yes LDA  for Pressure Injury Previously documented     [] Yes New Pressure Injury Discovered   [] LDA for New Pressure Injury Added      Attending RN:  Gilma Nicole RN     Second RN:  Mike Flores RN

## 2024-05-14 NOTE — ASSESSMENT & PLAN NOTE
Patient's anemia is currently controlled. Has not received any PRBCs to date. Etiology likely d/t chronic disease due to ESRD  Current CBC reviewed-   Lab Results   Component Value Date    HGB 7.7 (L) 05/14/2024    HCT 24.8 (L) 05/14/2024     Monitor serial CBC and transfuse if patient becomes hemodynamically unstable, symptomatic or H/H drops below 7/21.

## 2024-05-14 NOTE — ASSESSMENT & PLAN NOTE
3/20/24 RLE arterial US Arterial vasculature of the right lower extremity is patent.  However, there is evidence of atherosclerotic change with stenosis at the level of popliteal artery.   - continue asa  - ok to resume statin now that LFTs are normal  - Vascular consulted:  Plan for right lower extremity angiogram on 05/14

## 2024-05-14 NOTE — ASSESSMENT & PLAN NOTE
Worsening R foot wounds. Home health nursing noted purulence. No pain.   - blood cultures with Staph epi, coag negative- likely contaminant  - repeat blood cx with NGTD  - continue vanc, meropenem given history of ESBL organisms  - he does not want further surgeries on his foot and is preparing himself mentally for amputation.   - Orthopaedics ([facility]) consulted  - Vascular consulted: plans for RLE angiogram on 05/14  - his L foot also has a wound- XR no osteomyelitis, wound care consulted   - Plan for ID consult in the AM

## 2024-05-14 NOTE — PROGRESS NOTES
Monitored this shift. No changes in status. Dialysis 2 liters off today followed by Vancomycin. Wound care done by wound care nurse. Heparin infusing @ 18 units/kg .

## 2024-05-14 NOTE — ASSESSMENT & PLAN NOTE
Patient with Paroxysmal (<7 days) atrial fibrillation which is controlled currently with Beta Blocker. Patient is currently in sinus rhythm.[id number] Score: 3. Anticoagulation indicated. Anticoagulation done with apixaban .    -hold apixaban on 05/14 for plan angiogram

## 2024-05-14 NOTE — ASSESSMENT & PLAN NOTE
Patient's FSGs are controlled on current medication regimen.  Last A1c reviewed-   Lab Results   Component Value Date    HGBA1C 5.8 (H) 03/20/2024     Most recent fingerstick glucose reviewed-   Recent Labs   Lab 05/13/24  1606 05/13/24  1930 05/14/24  0738 05/14/24  1121   POCTGLUCOSE 136* 128* 96 92       Current correctional scale  Medium  Maintain anti-hyperglycemic dose as follows-   Antihyperglycemics (From admission, onward)    Start     Stop Route Frequency Ordered    05/10/24 1858  insulin aspart U-100 pen 0-10 Units         -- SubQ Before meals & nightly PRN 05/10/24 9095

## 2024-05-14 NOTE — SUBJECTIVE & OBJECTIVE
Interval History:  No acute overnight events.  Patient remained afebrile.  Plans for right lower extremity angiogram today with vascular surgery.  Currently denies any pain    Review of Systems   Constitutional:  Negative for chills and fever.   Respiratory:  Negative for shortness of breath.    Cardiovascular:  Negative for chest pain.   Gastrointestinal:  Negative for abdominal pain.   Skin:  Positive for wound.   Psychiatric/Behavioral:  Negative for confusion.      Objective:     Vital Signs (Most Recent):  Temp: 98.1 °F (36.7 °C) (05/14/24 1117)  Pulse: 64 (05/14/24 1117)  Resp: 18 (05/14/24 1117)  BP: (!) 149/69 (05/14/24 1117)  SpO2: 100 % (05/14/24 1117) Vital Signs (24h Range):  Temp:  [97.6 °F (36.4 °C)-98.5 °F (36.9 °C)] 98.1 °F (36.7 °C)  Pulse:  [64-81] 64  Resp:  [17-18] 18  SpO2:  [96 %-100 %] 100 %  BP: (137-173)/(64-76) 149/69     Weight: 77 kg (169 lb 12.1 oz)  Body mass index is 25.81 kg/m².    Intake/Output Summary (Last 24 hours) at 5/14/2024 1413  Last data filed at 5/14/2024 0550  Gross per 24 hour   Intake 480 ml   Output 2500 ml   Net -2020 ml           Physical Exam  Vitals and nursing note reviewed.   Constitutional:       General: He is not in acute distress.     Appearance: He is not ill-appearing or toxic-appearing.   HENT:      Head: Normocephalic and atraumatic.   Cardiovascular:      Rate and Rhythm: Normal rate and regular rhythm.   Pulmonary:      Effort: Pulmonary effort is normal.      Breath sounds: Normal breath sounds.      Comments: Room air  Abdominal:      General: Bowel sounds are normal. There is no distension.      Palpations: Abdomen is soft.      Tenderness: There is no abdominal tenderness.   Musculoskeletal:      Right lower leg: No edema.      Left lower leg: No edema.   Skin:     Comments: LUE AVF with thrill. Feet are currently dressed   Neurological:      Mental Status: He is alert and oriented to person, place, and time. Mental status is at baseline.    Psychiatric:         Mood and Affect: Mood normal.         Thought Content: Thought content normal.             Significant Labs: All pertinent labs within the past 24 hours have been reviewed.    Significant Imaging: I have reviewed all pertinent imaging results/findings within the past 24 hours.

## 2024-05-14 NOTE — ASSESSMENT & PLAN NOTE
Receives HD MWF via LUE AVF. Has not missed any sessions.   - Nephrology consulted  - appears euvolemic, dialysis per Nephrology

## 2024-05-14 NOTE — PROGRESS NOTES
[facility] Medicine  Progress Note    Patient Name: Miguel Moore  MRN: [id number]  Patient Class: IP- Inpatient   Admission Date: 5/10/2024  Length of Stay: 4 days  Attending Physician: Pepe Martino DO  Primary Care Provider: Raciel Raymond MD        Subjective:     Principal Problem:Open wound of right foot        HPI:  68 y.o. male with hypertension, BPH, chronic right lower extremity DVT, DM2, dyslipidemia, ESRD on HD, hemiplegia and hemiparesis following cerebral infarction affecting the right dominant side, paroxysmal AFib, seizure disorder as sequela of CVA sent to the ED from wound care for new wound infection.  Denies fever, chills, cough, shortness breath, chest pain, dizziness, syncope.  Was hospitalized in March for cellulitis and abscess to the right foot with surgical intervention at that time.  In the ED today, CRP and procalcitonin elevated.  Blood and wound cultures were obtained.  IV antibiotics initiated.    Overview/Hospital Course:  67yo M ESRD, PAD, R foot wounds s/p multiple surgeries admitted w worsening wound. Did have Staph epi in blood culture, likely contaminant. He does not want further debridement, wants BKA if needed. Started vancomycin and meropenem given his history of ESBL organisms. [facility] consulted. Vascular consulted- plan for RLE angiogram on 05/14. Nephro following for HD. He also has L heel dark area, XR left heel with Osseous structures demonstrate no evidence for acute fracture or osseous destructive lesion.  Mild diffuse demineralization. wound care consulted.       Interval History:  No acute overnight events.  Patient remained afebrile.  Plans for right lower extremity angiogram today with vascular surgery.  Currently denies any pain    Review of Systems   Constitutional:  Negative for chills and fever.   Respiratory:  Negative for shortness of breath.    Cardiovascular:  Negative for chest pain.   Gastrointestinal:  Negative  for abdominal pain.   Skin:  Positive for wound.   Psychiatric/Behavioral:  Negative for confusion.      Objective:     Vital Signs (Most Recent):  Temp: 98.1 °F (36.7 °C) (05/14/24 1117)  Pulse: 64 (05/14/24 1117)  Resp: 18 (05/14/24 1117)  BP: (!) 149/69 (05/14/24 1117)  SpO2: 100 % (05/14/24 1117) Vital Signs (24h Range):  Temp:  [97.6 °F (36.4 °C)-98.5 °F (36.9 °C)] 98.1 °F (36.7 °C)  Pulse:  [64-81] 64  Resp:  [17-18] 18  SpO2:  [96 %-100 %] 100 %  BP: (137-173)/(64-76) 149/69     Weight: 77 kg (169 lb 12.1 oz)  Body mass index is 25.81 kg/m².    Intake/Output Summary (Last 24 hours) at 5/14/2024 1413  Last data filed at 5/14/2024 0550  Gross per 24 hour   Intake 480 ml   Output 2500 ml   Net -2020 ml           Physical Exam  Vitals and nursing note reviewed.   Constitutional:       General: He is not in acute distress.     Appearance: He is not ill-appearing or toxic-appearing.   HENT:      Head: Normocephalic and atraumatic.   Cardiovascular:      Rate and Rhythm: Normal rate and regular rhythm.   Pulmonary:      Effort: Pulmonary effort is normal.      Breath sounds: Normal breath sounds.      Comments: Room air  Abdominal:      General: Bowel sounds are normal. There is no distension.      Palpations: Abdomen is soft.      Tenderness: There is no abdominal tenderness.   Musculoskeletal:      Right lower leg: No edema.      Left lower leg: No edema.   Skin:     Comments: LUE AVF with thrill. Feet are currently dressed   Neurological:      Mental Status: He is alert and oriented to person, place, and time. Mental status is at baseline.   Psychiatric:         Mood and Affect: Mood normal.         Thought Content: Thought content normal.             Significant Labs: All pertinent labs within the past 24 hours have been reviewed.    Significant Imaging: I have reviewed all pertinent imaging results/findings within the past 24 hours.    Assessment/Plan:      * Open wound of right foot  Worsening R foot wounds.  Home health nursing noted purulence. No pain.   - blood cultures with Staph epi, coag negative- likely contaminant  - repeat blood cx with NGTD  - continue vanc, meropenem given history of ESBL organisms  - he does not want further surgeries on his foot and is preparing himself mentally for amputation.   - Orthopaedics ([facility]) consulted  - Vascular consulted: plans for RLE angiogram on 05/14  - his L foot also has a wound- XR no osteomyelitis, wound care consulted   - Plan for ID consult in the AM    Cellulitis of right foot  -Continue vancomycin and meropenem   -infectious disease consult in the a.m.      Peripheral artery disease  3/20/24 RLE arterial US Arterial vasculature of the right lower extremity is patent.  However, there is evidence of atherosclerotic change with stenosis at the level of popliteal artery.   - continue asa  - ok to resume statin now that LFTs are normal  - Vascular consulted:  Plan for right lower extremity angiogram on 05/14      Paroxysmal atrial fibrillation  Patient with Paroxysmal (<7 days) atrial fibrillation which is controlled currently with Beta Blocker. Patient is currently in sinus rhythm.[id number] Score: 3. Anticoagulation indicated. Anticoagulation done with apixaban .    -hold apixaban on 05/14 for plan angiogram    ESRD (end stage renal disease)  Receives HD MWF via LUE AVF. Has not missed any sessions.   - Nephrology consulted  - appears euvolemic, dialysis per Nephrology    Anemia in chronic kidney disease  Patient's anemia is currently controlled. Has not received any PRBCs to date. Etiology likely d/t chronic disease due to ESRD  Current CBC reviewed-   Lab Results   Component Value Date    HGB 7.7 (L) 05/14/2024    HCT 24.8 (L) 05/14/2024     Monitor serial CBC and transfuse if patient becomes hemodynamically unstable, symptomatic or H/H drops below 7/21.    Secondary hyperparathyroidism of renal origin  Continue sevelamer      Benign essential  HTN  Chronic, controlled. Latest blood pressure and vitals reviewed-     Temp:  [97.6 °F (36.4 °C)-98.5 °F (36.9 °C)]   Pulse:  [64-81]   Resp:  [17-18]   BP: (137-173)/(64-76)   SpO2:  [96 %-100 %] .   Home meds for hypertension were reviewed and noted below.   Hypertension Medications               labetaloL (NORMODYNE) 100 MG tablet Take 1 tablet (100 mg total) by mouth once daily.            While in the hospital, will manage blood pressure as follows; Continue home antihypertensive regimen    Will utilize p.r.n. blood pressure medication only if patient's blood pressure greater than 180/110 and he develops symptoms such as worsening chest pain or shortness of breath.    Dyslipidemia  Not currently on statin due to previously elevated LFTs.   LFTs now normal- resume statin     Atherosclerosis of native artery of right lower extremity with ulceration  -continue statin therapy    Hemiplegia and hemiparesis following cerebral infarction affecting right dominant side  At baseline  Continue eliquis/asa, BP Rx  LFTs normalized- can resume statin now     Type 2 diabetes mellitus with diabetic neuropathy, with long-term current use of insulin  Patient's FSGs are controlled on current medication regimen.  Last A1c reviewed-   Lab Results   Component Value Date    HGBA1C 5.8 (H) 03/20/2024     Most recent fingerstick glucose reviewed-   Recent Labs   Lab 05/13/24  1606 05/13/24  1930 05/14/24  0738 05/14/24  1121   POCTGLUCOSE 136* 128* 96 92       Current correctional scale  Medium  Maintain anti-hyperglycemic dose as follows-   Antihyperglycemics (From admission, onward)      Start     Stop Route Frequency Ordered    05/10/24 1858  insulin aspart U-100 pen 0-10 Units         -- SubQ Before meals & nightly PRN 05/10/24 1758            BPH (benign prostatic hyperplasia) 2/18/21 prostate bx normal  Continue home regimen of tamsulosin and finasteride      VTE Risk Mitigation (From admission, onward)           Ordered      heparin 25,000 units in dextrose 5% (100 units/ml) IV bolus from bag HIGH INTENSITY nomogram - [facility]  As needed (PRN)        Question:  Heparin Infusion Adjustment (DO NOT MODIFY ANSWER)  Answer:  [email or web address]\epic\Images\Pharmacy\HeparinInfusions\heparin HIGH INTENSITY nomogram for [facility] [id number]    05/13/24 1416     heparin 25,000 units in dextrose 5% (100 units/ml) IV bolus from bag HIGH INTENSITY nomogram - [facility]  As needed (PRN)        Question:  Heparin Infusion Adjustment (DO NOT MODIFY ANSWER)  Answer:  \\[email or web address]\epic\Images\Pharmacy\HeparinInfusions\heparin HIGH INTENSITY nomogram for [facility] [id number].pdf    05/13/24 1416     heparin 25,000 units in dextrose 5% 250 mL (100 units/mL) infusion HIGH INTENSITY nomogram - [facility]  Continuous        Question:  Begin at (units/kg/hr)  Answer:  18    05/13/24 1416     IP VTE HIGH RISK PATIENT  Once         05/10/24 1758     Place sequential compression device  Until discontinued         05/10/24 1758     Reason for No Pharmacological VTE Prophylaxis  Once        Question:  Reasons:  Answer:  Already adequately anticoagulated on oral Anticoagulants    05/10/24 1758                    Discharge Planning   GARY:      Code Status: Full Code   Is the patient medically ready for discharge?:     Reason for patient still in hospital (select all that apply): Patient trending condition, Treatment, and Consult recommendations  Discharge Plan A: Home with family                  NusratLiss Martino DO  Department of Hospital Medicine   [facility] [place] - Observation

## 2024-05-14 NOTE — PLAN OF CARE
Problem: Diabetes Comorbidity  Goal: Blood Glucose Level Within Targeted Range  Outcome: Progressing     Problem: Skin Injury Risk Increased  Goal: Skin Health and Integrity  Outcome: Progressing

## 2024-05-15 PROBLEM — M86.271 SUBACUTE OSTEOMYELITIS OF RIGHT FOOT: Status: ACTIVE | Noted: 2024-05-13

## 2024-05-15 LAB
ALBUMIN SERPL BCP-MCNC: 1.8 G/DL (ref 3.5–5.2)
ALP SERPL-CCNC: 58 U/L (ref 55–135)
ALT SERPL W/O P-5'-P-CCNC: <5 U/L (ref 10–44)
ANION GAP SERPL CALC-SCNC: 11 MMOL/L (ref 8–16)
APTT PPP: 43.9 SEC (ref 21–32)
APTT PPP: 61.3 SEC (ref 21–32)
APTT PPP: 72.4 SEC (ref 21–32)
AST SERPL-CCNC: 10 U/L (ref 10–40)
BACTERIA BLD CULT: NORMAL
BACTERIA BLD CULT: NORMAL
BASOPHILS # BLD AUTO: 0.03 K/UL (ref 0–0.2)
BASOPHILS NFR BLD: 0.7 % (ref 0–1.9)
BILIRUB SERPL-MCNC: 0.3 MG/DL (ref 0.1–1)
BUN SERPL-MCNC: 21 MG/DL (ref 8–23)
CALCIUM SERPL-MCNC: 8 MG/DL (ref 8.7–10.5)
CHLORIDE SERPL-SCNC: 101 MMOL/L (ref 95–110)
CO2 SERPL-SCNC: 24 MMOL/L (ref 23–29)
CREAT SERPL-MCNC: 6.8 MG/DL (ref 0.5–1.4)
DIFFERENTIAL METHOD BLD: ABNORMAL
EOSINOPHIL # BLD AUTO: 0.2 K/UL (ref 0–0.5)
EOSINOPHIL NFR BLD: 4.1 % (ref 0–8)
ERYTHROCYTE [DISTWIDTH] IN BLOOD BY AUTOMATED COUNT: 17.7 % (ref 11.5–14.5)
EST. GFR  (NO RACE VARIABLE): 8 ML/MIN/1.73 M^2
GLUCOSE SERPL-MCNC: 99 MG/DL (ref 70–110)
HCT VFR BLD AUTO: 27.8 % (ref 40–54)
HGB BLD-MCNC: 8.6 G/DL (ref 14–18)
IMM GRANULOCYTES # BLD AUTO: 0.02 K/UL (ref 0–0.04)
IMM GRANULOCYTES NFR BLD AUTO: 0.5 % (ref 0–0.5)
LYMPHOCYTES # BLD AUTO: 1.1 K/UL (ref 1–4.8)
LYMPHOCYTES NFR BLD: 23.9 % (ref 18–48)
MCH RBC QN AUTO: 25.4 PG (ref 27–31)
MCHC RBC AUTO-ENTMCNC: 30.9 G/DL (ref 32–36)
MCV RBC AUTO: 82 FL (ref 82–98)
MONOCYTES # BLD AUTO: 0.4 K/UL (ref 0.3–1)
MONOCYTES NFR BLD: 9.7 % (ref 4–15)
NEUTROPHILS # BLD AUTO: 2.7 K/UL (ref 1.8–7.7)
NEUTROPHILS NFR BLD: 61.1 % (ref 38–73)
NRBC BLD-RTO: 0 /100 WBC
PHOSPHATE SERPL-MCNC: 4.2 MG/DL (ref 2.7–4.5)
PLATELET # BLD AUTO: 208 K/UL (ref 150–450)
PMV BLD AUTO: 9.4 FL (ref 9.2–12.9)
POCT GLUCOSE: 137 MG/DL (ref 70–110)
POCT GLUCOSE: 156 MG/DL (ref 70–110)
POCT GLUCOSE: 175 MG/DL (ref 70–110)
POTASSIUM SERPL-SCNC: 3.6 MMOL/L (ref 3.5–5.1)
PROT SERPL-MCNC: 5.1 G/DL (ref 6–8.4)
RBC # BLD AUTO: 3.38 M/UL (ref 4.6–6.2)
SODIUM SERPL-SCNC: 136 MMOL/L (ref 136–145)
VANCOMYCIN SERPL-MCNC: 16.7 UG/ML
WBC # BLD AUTO: 4.44 K/UL (ref 3.9–12.7)

## 2024-05-15 PROCEDURE — 80202 ASSAY OF VANCOMYCIN: CPT | Mod: HCNC | Performed by: HOSPITALIST

## 2024-05-15 PROCEDURE — 99223 1ST HOSP IP/OBS HIGH 75: CPT | Mod: HCNC,,, | Performed by: STUDENT IN AN ORGANIZED HEALTH CARE EDUCATION/TRAINING PROGRAM

## 2024-05-15 PROCEDURE — 25000003 PHARM REV CODE 250: Mod: HCNC | Performed by: STUDENT IN AN ORGANIZED HEALTH CARE EDUCATION/TRAINING PROGRAM

## 2024-05-15 PROCEDURE — 36415 COLL VENOUS BLD VENIPUNCTURE: CPT | Mod: HCNC | Performed by: STUDENT IN AN ORGANIZED HEALTH CARE EDUCATION/TRAINING PROGRAM

## 2024-05-15 PROCEDURE — 63600175 PHARM REV CODE 636 W HCPCS: Mod: HCNC | Performed by: STUDENT IN AN ORGANIZED HEALTH CARE EDUCATION/TRAINING PROGRAM

## 2024-05-15 PROCEDURE — 90935 HEMODIALYSIS ONE EVALUATION: CPT | Mod: HCNC

## 2024-05-15 PROCEDURE — 80053 COMPREHEN METABOLIC PANEL: CPT | Mod: HCNC | Performed by: HOSPITALIST

## 2024-05-15 PROCEDURE — 85730 THROMBOPLASTIN TIME PARTIAL: CPT | Mod: HCNC | Performed by: STUDENT IN AN ORGANIZED HEALTH CARE EDUCATION/TRAINING PROGRAM

## 2024-05-15 PROCEDURE — 63600175 PHARM REV CODE 636 W HCPCS: Mod: JZ,JG,HCNC | Performed by: INTERNAL MEDICINE

## 2024-05-15 PROCEDURE — 85025 COMPLETE CBC W/AUTO DIFF WBC: CPT | Mod: HCNC | Performed by: STUDENT IN AN ORGANIZED HEALTH CARE EDUCATION/TRAINING PROGRAM

## 2024-05-15 PROCEDURE — 25000003 PHARM REV CODE 250: Mod: HCNC | Performed by: HOSPITALIST

## 2024-05-15 PROCEDURE — 11000001 HC ACUTE MED/SURG PRIVATE ROOM: Mod: HCNC

## 2024-05-15 PROCEDURE — 63600175 PHARM REV CODE 636 W HCPCS: Mod: HCNC | Performed by: HOSPITALIST

## 2024-05-15 RX ADMIN — MUPIROCIN: 20 OINTMENT TOPICAL at 08:05

## 2024-05-15 RX ADMIN — TAMSULOSIN HYDROCHLORIDE 0.8 MG: 0.4 CAPSULE ORAL at 08:05

## 2024-05-15 RX ADMIN — EPOETIN ALFA-EPBX 10000 UNITS: 10000 INJECTION, SOLUTION INTRAVENOUS; SUBCUTANEOUS at 08:05

## 2024-05-15 RX ADMIN — ATORVASTATIN CALCIUM 80 MG: 40 TABLET, FILM COATED ORAL at 08:05

## 2024-05-15 RX ADMIN — ASPIRIN 81 MG: 81 TABLET, COATED ORAL at 08:05

## 2024-05-15 RX ADMIN — INSULIN ASPART 2 UNITS: 100 INJECTION, SOLUTION INTRAVENOUS; SUBCUTANEOUS at 04:05

## 2024-05-15 RX ADMIN — SEVELAMER CARBONATE 800 MG: 800 TABLET, FILM COATED ORAL at 08:05

## 2024-05-15 RX ADMIN — Medication 1 CAPSULE: at 08:05

## 2024-05-15 RX ADMIN — VANCOMYCIN HYDROCHLORIDE 500 MG: 500 INJECTION, POWDER, LYOPHILIZED, FOR SOLUTION INTRAVENOUS at 04:05

## 2024-05-15 RX ADMIN — SEVELAMER CARBONATE 800 MG: 800 TABLET, FILM COATED ORAL at 04:05

## 2024-05-15 RX ADMIN — HEPARIN SODIUM 12 UNITS/KG/HR: 10000 INJECTION, SOLUTION INTRAVENOUS at 11:05

## 2024-05-15 RX ADMIN — FINASTERIDE 5 MG: 5 TABLET, FILM COATED ORAL at 08:05

## 2024-05-15 RX ADMIN — DEXTROSE MONOHYDRATE 1 G: 2.5 INJECTION INTRAVENOUS at 03:05

## 2024-05-15 NOTE — CONSULTS
[facility] [place] - Observation  Infectious Disease  Consult Note    Patient Name: Miguel Moore  MRN: [id number]  Admission Date: 5/10/2024  Hospital Length of Stay: 5 days  Attending Physician: Pepe Martino DO  Primary Care Provider: Raciel Raymond MD     Isolation Status: No active isolations    Patient information was obtained from patient, past medical records, ER records, and primary team.      Inpatient consult to Infectious Diseases  Consult performed by: Laney Underwood MD  Consult ordered by: Pepe Martino DO        Assessment/Plan:     ID  Subacute osteomyelitis of right foot  I independently reviewed patient's lab work and images as documented. 69 yo male with ESRD on HD, DM and CVA with R sided weakness with recent admission for R ankle OM (maintained on vanc/ancef x 6w, ruben 5/20) admitted for unhealing R wound. ID consulted for abx recs. Hospital course notable for blood cx with CONS, suspected contaminant, repeat blcx ngtd, had wound cx obtained in ED - no growth. He is pending angio with vascular and evaluation by ortho for potential surgical disposition. Prior to admission, he denied fevers or chills. Suspect poor wound healing due to decreased blood flow. Labs notable for no leukocytosis.     Recommendations:  -de-escalate meropenem to ancef (ordered) to target prior isolated organism  -no new culture data this admission of R foot and pt is hemodynamically stable   -continue vancomycin pharm to dose   -follow up vascular and ortho recs             Thank you for your consult. I will follow-up with patient. Please contact us if you have any additional questions. Above d/w primary team.       Laney Underwood MD  Infectious Disease  [facility] [place] - Observation    Subjective:     Principal Problem: Open wound of right foot    HPI: 69 yo male with ESRD on HD, DM and CVA with R sided weakness with recent admission for R ankle OM (maintained on vanc/tara x 6w, ruben 5/20) admitted for unhealing R  wound. ID consulted for abx recs. Hospital course notable for blood cx with CONS, suspected contaminant, repeat blcx ngtd. He is pending angio with vascular and evaluation by ortho for potential surgical disposition. Prior to admission, he denied fevers or chills.         Past Medical History:   Diagnosis Date    Allergy     Clotting disorder     Elaquis and APlavix    Deep vein thrombosis     Diabetes mellitus, type 2     Hypertension     Nuclear sclerosis of both eyes 6/9/2017    Renal disorder     Seizures     Stroke     Urinary tract infection        Past Surgical History:   Procedure Laterality Date    CATARACT EXTRACTION W/  INTRAOCULAR LENS IMPLANT Right 10/05/2017    Dr. Tay    CATARACT EXTRACTION W/  INTRAOCULAR LENS IMPLANT Left 10/19/2017    Dr. Tay    CYSTOSCOPY W/ RETROGRADES Bilateral 2/18/2021    Procedure: CYSTOSCOPY, WITH RETROGRADE PYELOGRAM;  Surgeon: JEMMA Styles MD;  Location: [facility] OR;  Service: Urology;  Laterality: Bilateral;  REQUESTING EARLY AS POSSIBE-LO / 2/8/2021 @ 1:13PM  RN Pre Op 2-11-21, Covid NEGATIVE ON  2-17-21.  C A    ESOPHAGOGASTRODUODENOSCOPY N/A 8/17/2020    Procedure: EGD (ESOPHAGOGASTRODUODENOSCOPY);  Surgeon: Desmond Chapman MD;  Location: [facility] ENDO;  Service: Endoscopy;  Laterality: N/A;    EYE SURGERY Bilateral     cataract    FEMORAL ARTERY STENT      FRACTURE SURGERY      INCISION AND DRAINAGE, LOWER EXTREMITY Right 4/4/2024    Procedure: INCISION AND DRAINAGE, LOWER EXTREMITY;  Surgeon: Jimbo Montilla III, MD;  Location: [facility] OR;  Service: Orthopedics;  Laterality: Right;    INCISION AND DRAINAGE, LOWER EXTREMITY Right 4/6/2024    Procedure: INCISION AND DRAINAGE, LOWER EXTREMITY;  Surgeon: Desmond Barillas MD;  Location: [facility] OR;  Service: Orthopedics;  Laterality: Right;  foot and ankle    INCISION AND DRAINAGE, LOWER EXTREMITY Right 4/9/2024    Procedure: INCISION AND DRAINAGE, LOWER EXTREMITY- FOOT & ANKLE;  Surgeon: Jimbo Montilla III  MD;  Location: [facility] OR;  Service: Orthopedics;  Laterality: Right;  CULTURES, VASCHE    KNEE SURGERY      bilateral scope    WOUND DRESSING Right 4/9/2024    Procedure: WOUND VAC EXCHANGE;  Surgeon: Jimbo Montilla III, MD;  Location: [facility] OR;  Service: Orthopedics;  Laterality: Right;       Review of patient's allergies indicates:   Allergen Reactions    Ace inhibitors      Hyperkalemia 8/2018  Other reaction(s): Unknown    Penicillins Hives     Tolerated cefepime and cefdinir previously    Tizanidine      Felt hot       Medications:  Medications Prior to Admission   Medication Sig    apixaban (ELIQUIS) 5 mg Tab Take 1 tablet (5 mg total) by mouth 2 (two) times daily.    aspirin (ECOTRIN) 81 MG EC tablet Take 1 tablet (81 mg total) by mouth once daily.    dextrose 5 % in water (D5W) PgBk 50 mL with ceFAZolin 1 gram SolR Ancef 2g/2g/3g after HD on HD days    ferrous gluconate (FERGON) 324 MG tablet Take 1 tablet (324 mg total) by mouth 2 (two) times daily with meals.    finasteride (PROSCAR) 5 mg tablet Take 1 tablet (5 mg total) by mouth once daily.    iron sucrose in NS (VENOFER) 100 mg/100 mL PgBk 50 mg.    labetaloL (NORMODYNE) 100 MG tablet Take 1 tablet (100 mg total) by mouth once daily.    methoxy peg-epoetin beta (MIRCERA INJ) 75 mcg.    mupirocin (BACTROBAN) 2 % ointment Apply to affected area 3 times daily    oxyCODONE-acetaminophen (PERCOCET)  mg per tablet Take 1 tablet by mouth every 6 (six) hours as needed for Pain.    RENVELA 800 mg Tab Take 1 tablet (800 mg total) by mouth 3 (three) times daily with meals.    tamsulosin (FLOMAX) 0.4 mg Cap Take 2 capsules (0.8 mg total) by mouth once daily.    vancomycin HCl (VANCOMYCIN 500 MG/100 ML D5W, READY TO MIX,) Vancomycin 500mg IV after HD on HD days (M/W/F)    vitamin renal formula, B-complex-vitamin c-folic acid, (NEPHROCAP) 1 mg Cap Take 1 capsule by mouth once daily.     Antibiotics (From admission, onward)      Start     Stop Route Frequency  "Ordered    05/11/24 2100  mupirocin 2 % ointment         05/16/24 2059 Nasl 2 times daily 05/11/24 1215    05/10/24 1800  meropenem (MERREM) 500 mg in sodium chloride 0.9 % 100 mL IVPB (MB+)         -- IV Every 24 hours (non-standard times) 05/10/24 1656    05/10/24 1754  vancomycin - pharmacy to dose  (vancomycin IVPB (PEDS and ADULTS))        Placed in "And" Linked Group    -- IV pharmacy to manage frequency 05/10/24 1656          Antifungals (From admission, onward)      None          Antivirals (From admission, onward)      None             Immunization History   Administered Date(s) Administered    COVID-19, MRNA, LN-S, PF (MODERNA FULL 0.5 ML DOSE) 02/11/2021, 02/11/2021, 03/12/2021, 03/12/2021, 11/10/2021, 11/10/2021    Hepatitis B 04/11/2022, 06/13/2022, 07/11/2022, 08/08/2022    Hepatitis B (recombinant) Adjuvanted, 2 dose 08/08/2022    Hepatitis B, Adult 02/08/2021, 02/08/2021, 03/08/2021, 03/08/2021, 04/12/2021, 08/09/2021    Hepatitis B, Dialysis, 3 Dose 02/08/2021, 03/08/2021, 04/12/2021, 08/09/2021    Influenza (FLUBLOK) - Quadrivalent - Recombinant - PF *Preferred* (egg allergy) 10/13/2023    Influenza - High Dose - PF (65 years and older) 01/17/2020, 09/30/2020, 10/18/2021    Influenza - Quadrivalent - High Dose - PF (65 years and older) 10/18/2021, 09/28/2022    Influenza - Quadrivalent - PF (6-35 months) 10/01/2020    Influenza - Quadrivalent - PF *Preferred* (6 months and older) 01/04/2019    PPD Test 08/24/2020, 03/24/2024    Pneumococcal Conjugate - 13 Valent 09/09/2019    Pneumococcal Conjugate - 20 Valent 08/18/2022    Pneumococcal Polysaccharide - 23 Valent 05/08/2017    Tdap 05/08/2017    Zoster Recombinant 07/31/2020       Family History       Problem Relation (Age of Onset)    Cataracts Mother    Diabetes Mother    Heart disease Mother    Hypertension Mother, Sister    No Known Problems Father, Brother, Maternal Aunt, Maternal Uncle, Paternal Aunt, Paternal Uncle, Maternal Grandmother, " Maternal Grandfather, Paternal Grandmother, Paternal Grandfather, Daughter, Other          Social History     Socioeconomic History    Marital status: Single   Occupational History    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, etc   Tobacco Use    Smoking status: Never    Smokeless tobacco: Never   Substance and Sexual Activity    Alcohol use: No    Drug use: No   Social History Narrative    Lives with daughter     Review of Systems   Constitutional:  Negative for chills and fever.   Skin:  Positive for wound.     Objective:     Vital Signs (Most Recent):  Temp: 97.4 °F (36.3 °C) (05/15/24 0758)  Pulse: 62 (05/15/24 0758)  Resp: 18 (05/15/24 0758)  BP: (!) 160/73 (05/15/24 0758)  SpO2: 100 % (05/15/24 0758) Vital Signs (24h Range):  Temp:  [97.4 °F (36.3 °C)-98.2 °F (36.8 °C)] 97.4 °F (36.3 °C)  Pulse:  [62-70] 62  Resp:  [17-18] 18  SpO2:  [97 %-100 %] 100 %  BP: (120-173)/(59-76) 160/73     Weight: 77 kg (169 lb 12.1 oz)  Body mass index is 25.81 kg/m².    Estimated Creatinine Clearance: 9.9 mL/min (A) (based on SCr of 6.8 mg/dL (H)).     Physical Exam  Constitutional:       General: He is not in acute distress.     Appearance: He is not ill-appearing or toxic-appearing.   Eyes:      General:         Right eye: No discharge.         Left eye: No discharge.   Pulmonary:      Effort: Pulmonary effort is normal. No respiratory distress.   Abdominal:      General: There is no distension.      Palpations: Abdomen is soft.      Tenderness: There is no abdominal tenderness.   Musculoskeletal:      Right lower leg: No edema.      Left lower leg: No edema.   Skin:     General: Skin is warm and dry.      Comments: Desmond feet wrapped, reviewed photos    Neurological:      Mental Status: He is alert and oriented to person, place, and time.          Significant Labs:   Microbiology Results (last 7 days)       Procedure Component Value Units Date/Time    Blood culture [[id number]] Collected: 05/11/24 2006    Order  Status: Completed Specimen: Blood from Peripheral, Hand, Right Updated: 05/14/24 2303     Blood Culture, Routine No Growth to date      No Growth to date      No Growth to date      No Growth to date    Narrative:      could not draw second set cultures, due to patient being a hard   stick. reported to nurse Gwen    Blood culture [[id number]] Collected: 05/11/24 1335    Order Status: Completed Specimen: Blood Updated: 05/14/24 1703     Blood Culture, Routine No Growth to date      No Growth to date      No Growth to date      No Growth to date    Aerobic culture (Specify Source) **CANNOT BE ORDERED AS STAT** [[id number]] Collected: 05/10/24 1703    Order Status: Completed Specimen: Abscess from Foot, Right Updated: 05/14/24 0723     Aerobic Bacterial Culture No growth    Blood culture #2 [[id number]]  (Abnormal) Collected: 05/10/24 1547    Order Status: Completed Specimen: Blood from Peripheral, Hand, Right Updated: 05/13/24 0948     Blood Culture, Routine Gram stain aer bottle: Gram positive cocci in clusters resembling Staph      Positive results previously called      COAGULASE-NEGATIVE STAPHYLOCOCCUS SPECIES  Organism is a probable contaminant      Narrative:      Blood Culture #2    Blood culture #1 [[id number]]  (Abnormal) Collected: 05/10/24 1545    Order Status: Completed Specimen: Blood from Peripheral, Antecubital, Right Updated: 05/13/24 0948     Blood Culture, Routine Gram stain aer bottle: Gram positive cocci in clusters resembling Staph      Results called to and read back by: Gwen Staley 05/11/2024  09:56      COAGULASE-NEGATIVE STAPHYLOCOCCUS SPECIES  Organism is a probable contaminant      Narrative:      Blood Culture #1            Significant Imaging: I have reviewed all pertinent imaging results/findings within the past 24 hours.

## 2024-05-15 NOTE — PROGRESS NOTES
[facility] Medicine  Progress Note    Patient Name: Miguel Moore  MRN: [id number]  Patient Class: IP- Inpatient   Admission Date: 5/10/2024  Length of Stay: 5 days  Attending Physician: Pepe Martino DO  Primary Care Provider: Raciel Raymond MD        Subjective:     Principal Problem:Open wound of right foot        HPI:  68 y.o. male with hypertension, BPH, chronic right lower extremity DVT, DM2, dyslipidemia, ESRD on HD, hemiplegia and hemiparesis following cerebral infarction affecting the right dominant side, paroxysmal AFib, seizure disorder as sequela of CVA sent to the ED from wound care for new wound infection.  Denies fever, chills, cough, shortness breath, chest pain, dizziness, syncope.  Was hospitalized in March for cellulitis and abscess to the right foot with surgical intervention at that time.  In the ED today, CRP and procalcitonin elevated.  Blood and wound cultures were obtained.  IV antibiotics initiated.    Overview/Hospital Course:  67yo M ESRD, PAD, R foot wounds s/p multiple surgeries admitted w worsening wound. Did have Staph epi in blood culture, likely contaminant. He does not want further debridement, wants BKA if needed. Started vancomycin and meropenem given his history of ESBL organisms. [facility]o [facility] consulted. Vascular consulted- plan for RLE angiogram on 05/16 now. Nephro following for HD. He also has L heel dark area, XR left heel with Osseous structures demonstrate no evidence for acute fracture or osseous destructive lesion.  Mild diffuse demineralization. wound care consulted.       Interval History:  No acute overnight events.  Patient remained afebrile.  Angiogram cancel yesterday by vascular due to OR availability. Plans for right lower extremity angiogram tomorrow with vascular surgery.  Currently denies any pain    Review of Systems   Constitutional:  Negative for chills and fever.   Respiratory:  Negative for shortness of breath.     Cardiovascular:  Negative for chest pain.   Gastrointestinal:  Negative for abdominal pain.   Skin:  Positive for wound.   Psychiatric/Behavioral:  Negative for confusion.      Objective:     Vital Signs (Most Recent):  Temp: 97.4 °F (36.3 °C) (05/15/24 0758)  Pulse: 62 (05/15/24 0758)  Resp: 18 (05/15/24 0758)  BP: (!) 160/73 (05/15/24 0758)  SpO2: 100 % (05/15/24 0758) Vital Signs (24h Range):  Temp:  [97.4 °F (36.3 °C)-98.2 °F (36.8 °C)] 97.4 °F (36.3 °C)  Pulse:  [62-70] 62  Resp:  [17-18] 18  SpO2:  [97 %-100 %] 100 %  BP: (120-169)/(59-74) 160/73     Weight: 77 kg (169 lb 12.1 oz)  Body mass index is 25.81 kg/m².    Intake/Output Summary (Last 24 hours) at 5/15/2024 1328  Last data filed at 5/15/2024 0505  Gross per 24 hour   Intake 240 ml   Output --   Net 240 ml           Physical Exam  Vitals and nursing note reviewed.   Constitutional:       General: He is not in acute distress.     Appearance: He is not ill-appearing or toxic-appearing.   HENT:      Head: Normocephalic and atraumatic.   Cardiovascular:      Rate and Rhythm: Normal rate and regular rhythm.   Pulmonary:      Effort: Pulmonary effort is normal.      Breath sounds: Normal breath sounds.      Comments: Room air  Abdominal:      General: Bowel sounds are normal. There is no distension.      Palpations: Abdomen is soft.      Tenderness: There is no abdominal tenderness.   Musculoskeletal:      Right lower leg: No edema.      Left lower leg: No edema.   Skin:     Comments: LUE AVF with thrill. Feet are currently dressed   Neurological:      Mental Status: He is alert and oriented to person, place, and time. Mental status is at baseline.   Psychiatric:         Mood and Affect: Mood normal.         Thought Content: Thought content normal.             Significant Labs: All pertinent labs within the past 24 hours have been reviewed.    Significant Imaging: I have reviewed all pertinent imaging results/findings within the past 24  hours.    Assessment/Plan:      * Open wound of right foot  Worsening R foot wounds. Home health nursing noted purulence. No pain.   - blood cultures with Staph epi, coag negative- likely contaminant  - repeat blood cx with NGTD  - continue vanc, given history of ESBL organisms. Meropenem dc on 05/15  - he does not want further surgeries on his foot and is preparing himself mentally for amputation.   - Orthopaedics ([facility]) consulted  - Vascular consulted: plans for RLE angiogram on 05/14  - his L foot also has a wound- XR no osteomyelitis, wound care consulted   - ID consulted    Subacute osteomyelitis of right foot  -Continue vancomycin and ancef  -infectious disease consulted      Peripheral artery disease  3/20/24 RLE arterial US Arterial vasculature of the right lower extremity is patent.  However, there is evidence of atherosclerotic change with stenosis at the level of popliteal artery.   - continue asa  - ok to resume statin now that LFTs are normal  - Vascular consulted:  Plan for right lower extremity angiogram on 05/16      Paroxysmal atrial fibrillation  Patient with Paroxysmal (<7 days) atrial fibrillation which is controlled currently with Beta Blocker. Patient is currently in sinus rhythm.[id number] Score: 3. Anticoagulation indicated. Anticoagulation done with apixaban .    -hold apixaban on 05/14 for plan angiogram    ESRD (end stage renal disease)  Receives HD MWF via LUE AVF. Has not missed any sessions.   - Nephrology consulted  - appears euvolemic, dialysis per Nephrology    Anemia in chronic kidney disease  Patient's anemia is currently controlled. Has not received any PRBCs to date. Etiology likely d/t chronic disease due to ESRD  Current CBC reviewed-   Lab Results   Component Value Date    HGB 8.6 (L) 05/15/2024    HCT 27.8 (L) 05/15/2024     Monitor serial CBC and transfuse if patient becomes hemodynamically unstable, symptomatic or H/H drops below 7/21.    Secondary  hyperparathyroidism of renal origin  Continue sevelamer      Benign essential HTN  Chronic, controlled. Latest blood pressure and vitals reviewed-     Temp:  [97.4 °F (36.3 °C)-98.2 °F (36.8 °C)]   Pulse:  [62-70]   Resp:  [17-18]   BP: (120-169)/(59-74)   SpO2:  [97 %-100 %] .   Home meds for hypertension were reviewed and noted below.   Hypertension Medications               labetaloL (NORMODYNE) 100 MG tablet Take 1 tablet (100 mg total) by mouth once daily.            While in the hospital, will manage blood pressure as follows; Continue home antihypertensive regimen    Will utilize p.r.n. blood pressure medication only if patient's blood pressure greater than 180/110 and he develops symptoms such as worsening chest pain or shortness of breath.    Dyslipidemia  Not currently on statin due to previously elevated LFTs.   LFTs now normal- resume statin     Atherosclerosis of native artery of right lower extremity with ulceration  -continue statin therapy    Hemiplegia and hemiparesis following cerebral infarction affecting right dominant side  At baseline  Continue eliquis/asa, BP Rx  LFTs normalized- can resume statin now     Type 2 diabetes mellitus with diabetic neuropathy, with long-term current use of insulin  Patient's FSGs are controlled on current medication regimen.  Last A1c reviewed-   Lab Results   Component Value Date    HGBA1C 5.8 (H) 03/20/2024     Most recent fingerstick glucose reviewed-   Recent Labs   Lab 05/14/24  1549 05/14/24  1940   POCTGLUCOSE 95 156*       Current correctional scale  Medium  Maintain anti-hyperglycemic dose as follows-   Antihyperglycemics (From admission, onward)      Start     Stop Route Frequency Ordered    05/10/24 1858  insulin aspart U-100 pen 0-10 Units         -- SubQ Before meals & nightly PRN 05/10/24 1758            BPH (benign prostatic hyperplasia) 2/18/21 prostate bx normal  Continue home regimen of tamsulosin and finasteride      VTE Risk Mitigation (From  admission, onward)           Ordered     heparin 25,000 units in dextrose 5% (100 units/ml) IV bolus from bag HIGH INTENSITY nomogram - [facility]  As needed (PRN)        Question:  Heparin Infusion Adjustment (DO NOT MODIFY ANSWER)  Answer:  \\[email or web address]\epic\Images\Pharmacy\HeparinInfusions\heparin HIGH INTENSITY nomogram for [facility] [id number]    05/14/24 1642     heparin 25,000 units in dextrose 5% (100 units/ml) IV bolus from bag HIGH INTENSITY nomogram - [facility]  As needed (PRN)        Question:  Heparin Infusion Adjustment (DO NOT MODIFY ANSWER)  Answer:  \\[email or web address]\epic\Images\Pharmacy\HeparinInfusions\heparin HIGH INTENSITY nomogram for [facility] [id number]    05/14/24 1642     heparin 25,000 units in dextrose 5% 250 mL (100 units/mL) infusion HIGH INTENSITY nomogram - [facility]  Continuous        Question:  Begin at (units/kg/hr)  Answer:  18    05/14/24 1642     heparin 25,000 units in dextrose 5% 250 mL (100 units/mL) infusion HIGH INTENSITY nomogram - [facility]  Continuous        Question:  Begin at (units/kg/hr)  Answer:  18 05/13/24 1416     IP VTE HIGH RISK PATIENT  Once         05/10/24 1758     Place sequential compression device  Until discontinued         05/10/24 1758     Reason for No Pharmacological VTE Prophylaxis  Once        Question:  Reasons:  Answer:  Already adequately anticoagulated on oral Anticoagulants    05/10/24 1758                    Discharge Planning   GARY:      Code Status: Full Code   Is the patient medically ready for discharge?:     Reason for patient still in hospital (select all that apply): Patient trending condition, Treatment, and Consult recommendations  Discharge Plan A: Home with family                  NusratLiss Martino DO  Department of Hospital Medicine   [facility] - Observation

## 2024-05-15 NOTE — PROGRESS NOTES
The patient is in bed.  He is awaiting his angiography.        Physical examination:    Right foot is dressed.        Assessment and Plan:    68-year-old male with end-stage renal disease and right foot abscess.  He previously been treated for right ankle abscess.  He has evidence of necrosis of the skin on his foot.  He has a right foot plantar abscess draining purulent material.     Appreciate vascular surgery evaluation.    We will discuss options including level of amputation following full recommendations.    Jimbo Montilla MD  [facility]  [phone]  This note has been transcribed with voice recognition software, but not reviewed and may contain unrecognized errors.

## 2024-05-15 NOTE — PROGRESS NOTES
Returned from dialysis without signs of distress. Heparin infusing @ 12 units/kg/hr . Patient aware of NPO after MN status for procedure on 5/16/24.

## 2024-05-15 NOTE — ASSESSMENT & PLAN NOTE
Patient's anemia is currently controlled. Has not received any PRBCs to date. Etiology likely d/t chronic disease due to ESRD  Current CBC reviewed-   Lab Results   Component Value Date    HGB 8.6 (L) 05/15/2024    HCT 27.8 (L) 05/15/2024     Monitor serial CBC and transfuse if patient becomes hemodynamically unstable, symptomatic or H/H drops below 7/21.

## 2024-05-15 NOTE — ASSESSMENT & PLAN NOTE
Patient with Paroxysmal (<7 days) atrial fibrillation which is controlled currently with Beta Blocker. Patient is currently in sinus rhythm.[id number] Score: 3. Anticoagulation indicated. Anticoagulation done with apixaban .    -hold apixaban on 05/14 for plan angiogram

## 2024-05-15 NOTE — NURSING
[facility], [facility] - [place]  Nurses Note -- 4 Eyes      5/14/2024       Skin assessed on: Q Shift      [] No Pressure Injuries Present    []Prevention Measures Documented    [x] Yes LDA  for Pressure Injury Previously documented     [] Yes New Pressure Injury Discovered   [] LDA for New Pressure Injury Added      Attending RN:  Gilma Nicole RN     Second RN:  Mike Flores RN

## 2024-05-15 NOTE — PROGRESS NOTES
[facility], [facility]  Nurses Note -- 4 Eyes      5/15/2024       Skin assessed on: Q Shift      [] No Pressure Injuries Present    []Prevention Measures Documented    [x] Yes LDA  for Pressure Injury Previously documented     [] Yes New Pressure Injury Discovered   [] LDA for New Pressure Injury Added      Attending RN:  Mike Flores RN     Second RN:  Gilma Nicole RN

## 2024-05-15 NOTE — PROGRESS NOTES
"Called received from dialysis nurse to come and check Heparin infusion that has been off . Stated something was wrong with the pump. Unsure for how long and patient stated for " a while ". I restarted the heparin at the same previous rate 12 units/kg/hr and adjusted lab  time per nomogram to collect 6 hrs after any rate change.   "

## 2024-05-15 NOTE — SUBJECTIVE & OBJECTIVE
Past Medical History:   Diagnosis Date    Allergy     Clotting disorder     Elaquis and APlavix    Deep vein thrombosis     Diabetes mellitus, type 2     Hypertension     Nuclear sclerosis of both eyes 6/9/2017    Renal disorder     Seizures     Stroke     Urinary tract infection        Past Surgical History:   Procedure Laterality Date    CATARACT EXTRACTION W/  INTRAOCULAR LENS IMPLANT Right 10/05/2017    Dr. Tay    CATARACT EXTRACTION W/  INTRAOCULAR LENS IMPLANT Left 10/19/2017    Dr. Tay    CYSTOSCOPY W/ RETROGRADES Bilateral 2/18/2021    Procedure: CYSTOSCOPY, WITH RETROGRADE PYELOGRAM;  Surgeon: JEMMA Styles MD;  Location: [facility] OR;  Service: Urology;  Laterality: Bilateral;  REQUESTING EARLY AS POSSIBE-LO / 2/8/2021 @ 1:13PM  RN Pre Op 2-11-21, Covid NEGATIVE ON  2-17-21.  C A    ESOPHAGOGASTRODUODENOSCOPY N/A 8/17/2020    Procedure: EGD (ESOPHAGOGASTRODUODENOSCOPY);  Surgeon: Demsond Chapman MD;  Location: [facility];  Service: Endoscopy;  Laterality: N/A;    EYE SURGERY Bilateral     cataract    FEMORAL ARTERY STENT      FRACTURE SURGERY      INCISION AND DRAINAGE, LOWER EXTREMITY Right 4/4/2024    Procedure: INCISION AND DRAINAGE, LOWER EXTREMITY;  Surgeon: Jimbo Montilla III, MD;  Location: [facility] OR;  Service: Orthopedics;  Laterality: Right;    INCISION AND DRAINAGE, LOWER EXTREMITY Right 4/6/2024    Procedure: INCISION AND DRAINAGE, LOWER EXTREMITY;  Surgeon: Desmond Barillas MD;  Location: [facility] OR;  Service: Orthopedics;  Laterality: Right;  foot and ankle    INCISION AND DRAINAGE, LOWER EXTREMITY Right 4/9/2024    Procedure: INCISION AND DRAINAGE, LOWER EXTREMITY- FOOT & ANKLE;  Surgeon: Jimbo Montilla III, MD;  Location: [facility] OR;  Service: Orthopedics;  Laterality: Right;  CULTURES, VASCHE    KNEE SURGERY      bilateral scope    WOUND DRESSING Right 4/9/2024    Procedure: WOUND VAC EXCHANGE;  Surgeon: Jimbo Montilla III, MD;  Location: [facility] OR;  Service: Orthopedics;   Laterality: Right;       Review of patient's allergies indicates:   Allergen Reactions    Ace inhibitors      Hyperkalemia 8/2018  Other reaction(s): Unknown    Penicillins Hives     Tolerated cefepime and cefdinir previously    Tizanidine      Felt hot       Medications:  Medications Prior to Admission   Medication Sig    apixaban (ELIQUIS) 5 mg Tab Take 1 tablet (5 mg total) by mouth 2 (two) times daily.    aspirin (ECOTRIN) 81 MG EC tablet Take 1 tablet (81 mg total) by mouth once daily.    dextrose 5 % in water (D5W) PgBk 50 mL with ceFAZolin 1 gram SolR Ancef 2g/2g/3g after HD on HD days    ferrous gluconate (FERGON) 324 MG tablet Take 1 tablet (324 mg total) by mouth 2 (two) times daily with meals.    finasteride (PROSCAR) 5 mg tablet Take 1 tablet (5 mg total) by mouth once daily.    iron sucrose in NS (VENOFER) 100 mg/100 mL PgBk 50 mg.    labetaloL (NORMODYNE) 100 MG tablet Take 1 tablet (100 mg total) by mouth once daily.    methoxy peg-epoetin beta (MIRCERA INJ) 75 mcg.    mupirocin (BACTROBAN) 2 % ointment Apply to affected area 3 times daily    oxyCODONE-acetaminophen (PERCOCET)  mg per tablet Take 1 tablet by mouth every 6 (six) hours as needed for Pain.    RENVELA 800 mg Tab Take 1 tablet (800 mg total) by mouth 3 (three) times daily with meals.    tamsulosin (FLOMAX) 0.4 mg Cap Take 2 capsules (0.8 mg total) by mouth once daily.    vancomycin HCl (VANCOMYCIN 500 MG/100 ML D5W, READY TO MIX,) Vancomycin 500mg IV after HD on HD days (M/W/F)    vitamin renal formula, B-complex-vitamin c-folic acid, (NEPHROCAP) 1 mg Cap Take 1 capsule by mouth once daily.     Antibiotics (From admission, onward)      Start     Stop Route Frequency Ordered    05/11/24 2100  mupirocin 2 % ointment         05/16/24 2059 Nasl 2 times daily 05/11/24 1215    05/10/24 1800  meropenem (MERREM) 500 mg in sodium chloride 0.9 % 100 mL IVPB (MB+)         -- IV Every 24 hours (non-standard times) 05/10/24 1656    05/10/24 4876   "vancomycin - pharmacy to dose  (vancomycin IVPB (PEDS and ADULTS))        Placed in "And" Linked Group    -- IV pharmacy to manage frequency 05/10/24 1656          Antifungals (From admission, onward)      None          Antivirals (From admission, onward)      None             Immunization History   Administered Date(s) Administered    COVID-19, MRNA, LN-S, PF (MODERNA FULL 0.5 ML DOSE) 02/11/2021, 02/11/2021, 03/12/2021, 03/12/2021, 11/10/2021, 11/10/2021    Hepatitis B 04/11/2022, 06/13/2022, 07/11/2022, 08/08/2022    Hepatitis B (recombinant) Adjuvanted, 2 dose 08/08/2022    Hepatitis B, Adult 02/08/2021, 02/08/2021, 03/08/2021, 03/08/2021, 04/12/2021, 08/09/2021    Hepatitis B, Dialysis, 3 Dose 02/08/2021, 03/08/2021, 04/12/2021, 08/09/2021    Influenza (FLUBLOK) - Quadrivalent - Recombinant - PF *Preferred* (egg allergy) 10/13/2023    Influenza - High Dose - PF (65 years and older) 01/17/2020, 09/30/2020, 10/18/2021    Influenza - Quadrivalent - High Dose - PF (65 years and older) 10/18/2021, 09/28/2022    Influenza - Quadrivalent - PF (6-35 months) 10/01/2020    Influenza - Quadrivalent - PF *Preferred* (6 months and older) 01/04/2019    PPD Test 08/24/2020, 03/24/2024    Pneumococcal Conjugate - 13 Valent 09/09/2019    Pneumococcal Conjugate - 20 Valent 08/18/2022    Pneumococcal Polysaccharide - 23 Valent 05/08/2017    Tdap 05/08/2017    Zoster Recombinant 07/31/2020       Family History       Problem Relation (Age of Onset)    Cataracts Mother    Diabetes Mother    Heart disease Mother    Hypertension Mother, Sister    No Known Problems Father, Brother, Maternal Aunt, Maternal Uncle, Paternal Aunt, Paternal Uncle, Maternal Grandmother, Maternal Grandfather, Paternal Grandmother, Paternal Grandfather, Daughter, Other          Social History     Socioeconomic History    Marital status: Single   Occupational History    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, etc   Tobacco Use    Smoking " status: Never    Smokeless tobacco: Never   Substance and Sexual Activity    Alcohol use: No    Drug use: No   Social History Narrative    Lives with daughter     Review of Systems   Constitutional:  Negative for chills and fever.   Skin:  Positive for wound.     Objective:     Vital Signs (Most Recent):  Temp: 97.4 °F (36.3 °C) (05/15/24 0758)  Pulse: 62 (05/15/24 0758)  Resp: 18 (05/15/24 0758)  BP: (!) 160/73 (05/15/24 0758)  SpO2: 100 % (05/15/24 0758) Vital Signs (24h Range):  Temp:  [97.4 °F (36.3 °C)-98.2 °F (36.8 °C)] 97.4 °F (36.3 °C)  Pulse:  [62-70] 62  Resp:  [17-18] 18  SpO2:  [97 %-100 %] 100 %  BP: (120-173)/(59-76) 160/73     Weight: 77 kg (169 lb 12.1 oz)  Body mass index is 25.81 kg/m².    Estimated Creatinine Clearance: 9.9 mL/min (A) (based on SCr of 6.8 mg/dL (H)).     Physical Exam  Constitutional:       General: He is not in acute distress.     Appearance: He is not ill-appearing or toxic-appearing.   Eyes:      General:         Right eye: No discharge.         Left eye: No discharge.   Pulmonary:      Effort: Pulmonary effort is normal. No respiratory distress.   Abdominal:      General: There is no distension.      Palpations: Abdomen is soft.      Tenderness: There is no abdominal tenderness.   Musculoskeletal:      Right lower leg: No edema.      Left lower leg: No edema.   Skin:     General: Skin is warm and dry.      Comments: Desmond feet wrapped, reviewed photos    Neurological:      Mental Status: He is alert and oriented to person, place, and time.          Significant Labs:   Microbiology Results (last 7 days)       Procedure Component Value Units Date/Time    Blood culture [[id number]] Collected: 05/11/24 2006    Order Status: Completed Specimen: Blood from Peripheral, Hand, Right Updated: 05/14/24 6257     Blood Culture, Routine No Growth to date      No Growth to date      No Growth to date      No Growth to date    Narrative:      could not draw second set cultures, due to patient  being a hard   stick. reported to nurse Gwen    Blood culture [[id number]] Collected: 05/11/24 1335    Order Status: Completed Specimen: Blood Updated: 05/14/24 1703     Blood Culture, Routine No Growth to date      No Growth to date      No Growth to date      No Growth to date    Aerobic culture (Specify Source) **CANNOT BE ORDERED AS STAT** [[id number]] Collected: 05/10/24 1703    Order Status: Completed Specimen: Abscess from Foot, Right Updated: 05/14/24 0723     Aerobic Bacterial Culture No growth    Blood culture #2 [[id number]]  (Abnormal) Collected: 05/10/24 1547    Order Status: Completed Specimen: Blood from Peripheral, Hand, Right Updated: 05/13/24 0948     Blood Culture, Routine Gram stain aer bottle: Gram positive cocci in clusters resembling Staph      Positive results previously called      COAGULASE-NEGATIVE STAPHYLOCOCCUS SPECIES  Organism is a probable contaminant      Narrative:      Blood Culture #2    Blood culture #1 [[id number]]  (Abnormal) Collected: 05/10/24 1545    Order Status: Completed Specimen: Blood from Peripheral, Antecubital, Right Updated: 05/13/24 0948     Blood Culture, Routine Gram stain aer bottle: Gram positive cocci in clusters resembling Staph      Results called to and read back by: Gwen Staley 05/11/2024  09:56      COAGULASE-NEGATIVE STAPHYLOCOCCUS SPECIES  Organism is a probable contaminant      Narrative:      Blood Culture #1            Significant Imaging: I have reviewed all pertinent imaging results/findings within the past 24 hours.

## 2024-05-15 NOTE — ASSESSMENT & PLAN NOTE
3/20/24 RLE arterial US Arterial vasculature of the right lower extremity is patent.  However, there is evidence of atherosclerotic change with stenosis at the level of popliteal artery.   - continue asa  - ok to resume statin now that LFTs are normal  - Vascular consulted:  Plan for right lower extremity angiogram on 05/16

## 2024-05-15 NOTE — PROGRESS NOTES
Renal Progress Note    Date of Admission:  5/10/2024  2:04 PM    Length of Stay: 5  Days    Subjective: n/a    Objective:    Current Facility-Administered Medications   Medication    acetaminophen tablet 650 mg    albuterol-ipratropium 2.5 mg-0.5 mg/3 mL nebulizer solution 3 mL    aluminum-magnesium hydroxide-simethicone 200-200-20 mg/5 mL suspension 30 mL    aspirin EC tablet 81 mg    atorvastatin tablet 80 mg    bisacodyL suppository 10 mg    dextrose 10% bolus 125 mL 125 mL    dextrose 10% bolus 250 mL 250 mL    epoetin marisol-epbx injection 10,000 Units    finasteride tablet 5 mg    glucagon (human recombinant) injection 1 mg    glucose chewable tablet 16 g    glucose chewable tablet 24 g    heparin 25,000 units in dextrose 5% (100 units/ml) IV bolus from bag HIGH INTENSITY nomogram - [facility]    heparin 25,000 units in dextrose 5% (100 units/ml) IV bolus from bag HIGH INTENSITY nomogram - [facility]    heparin 25,000 units in dextrose 5% 250 mL (100 units/mL) infusion HIGH INTENSITY nomogram - [facility]    insulin aspart U-100 pen 0-10 Units    labetaloL tablet 100 mg    melatonin tablet 6 mg    meropenem (MERREM) 500 mg in sodium chloride 0.9 % 100 mL IVPB (MB+)    mupirocin 2 % ointment    naloxone 0.4 mg/mL injection 0.02 mg    ondansetron injection 4 mg    oxyCODONE-acetaminophen 5-325 mg per tablet 1 tablet    polyethylene glycol packet 17 g    prochlorperazine injection Soln 5 mg    sevelamer carbonate tablet 800 mg    simethicone chewable tablet 80 mg    sodium chloride 0.9% bolus 250 mL 250 mL    sodium chloride 0.9% flush 10 mL    tamsulosin 24 hr capsule 0.8 mg    vancomycin - pharmacy to dose    vitamin renal formula (B-complex-vitamin c-folic acid) 1 mg per capsule 1 capsule       Vitals:    05/14/24 1938 05/15/24 0003 05/15/24 0457 05/15/24 0758   BP: (!) 120/59 124/60 (!) 169/74 (!) 160/73   BP Location: Right arm Right arm Right arm Right arm   Patient Position: Lying Lying Lying Lying  "  Pulse: 70 62 64 62   Resp: 18 18 17 18   Temp: 98.1 °F (36.7 °C) 98.2 °F (36.8 °C) 97.9 °F (36.6 °C) 97.4 °F (36.3 °C)   TempSrc: Oral Oral Oral Oral   SpO2: 99% 97% 98% 100%   Weight:       Height:           I/O last 3 completed shifts:  In: 480 [P.O.:480]  Out: 0   No intake/output data recorded.      Physical Exam:     General: NAD, seen during Dialysis  Neck: supple  Heart: RRR  Lungs: unlabored breathing   Abdomen: n/a  Limbs: n/a  Neurologic: asleep      Laboratories:    Recent Labs   Lab 05/15/24  0416   WBC 4.44   RBC 3.38*   HGB 8.6*   HCT 27.8*      MCV 82   MCH 25.4*   MCHC 30.9*       Recent Labs   Lab 05/15/24  0416   CALCIUM 8.0*   ALBUMIN 1.8*   PROT 5.1*      K 3.6   CO2 24      BUN 21   CREATININE 6.8*   ALKPHOS 58   ALT <5*   AST 10   BILITOT 0.3       No results for input(s): "COLORU", "CLARITYU", "SPECGRAV", "PHUR", "PROTEINUA", "GLUCOSEU", "BILIRUBINCON", "BLOODU", "WBCU", "RBCU", "BACTERIA", "MUCUS" in the last 24 hours.    Microbiology Results (last 7 days)       Procedure Component Value Units Date/Time    Blood culture [[id number]] Collected: 05/11/24 2006    Order Status: Completed Specimen: Blood from Peripheral, Hand, Right Updated: 05/14/24 2303     Blood Culture, Routine No Growth to date      No Growth to date      No Growth to date      No Growth to date    Narrative:      could not draw second set cultures, due to patient being a hard   stick. reported to nurse Hidalgo    Blood culture [[id number]] Collected: 05/11/24 1335    Order Status: Completed Specimen: Blood Updated: 05/14/24 1703     Blood Culture, Routine No Growth to date      No Growth to date      No Growth to date      No Growth to date    Aerobic culture (Specify Source) **CANNOT BE ORDERED AS STAT** [[id number]] Collected: 05/10/24 1703    Order Status: Completed Specimen: Abscess from Foot, Right Updated: 05/14/24 0728     Aerobic Bacterial Culture No growth    Blood culture #2 [[id number]]  " (Abnormal) Collected: 05/10/24 1547    Order Status: Completed Specimen: Blood from Peripheral, Hand, Right Updated: 05/13/24 0948     Blood Culture, Routine Gram stain aer bottle: Gram positive cocci in clusters resembling Staph      Positive results previously called      COAGULASE-NEGATIVE STAPHYLOCOCCUS SPECIES  Organism is a probable contaminant      Narrative:      Blood Culture #2    Blood culture #1 [[id number]]  (Abnormal) Collected: 05/10/24 1545    Order Status: Completed Specimen: Blood from Peripheral, Antecubital, Right Updated: 05/13/24 0948     Blood Culture, Routine Gram stain aer bottle: Gram positive cocci in clusters resembling Staph      Results called to and read back by: Gwen Staley 05/11/2024  09:56      COAGULASE-NEGATIVE STAPHYLOCOCCUS SPECIES  Organism is a probable contaminant      Narrative:      Blood Culture #1              Diagnostic Tests: n/a        Assessment:    69 y/o male with ESRD on HD admitted with:    - R-foot abscess with open wound and skin necrosis s/p debridements  - PAD: Atherosclerosis of native artery of right lower extremity with ulceration   - Paroxysmal atrial fibrillation with RVR   - Hyperkalemia RESOLVED  - DM-2 w/renal manifestations  - HTN  - Anemia of ckd  - 2nd. Hyperparathyroidism  - Hypoalbuminemia  - Hx. Of prior CVA with R-hemiplegia  - Seizure disorder due to above  - HLD        Plan:     - Ortho following  - Vascular following awaiting RLE Angiogram  - Antibiotics (Vanco)  - Dialysis Q MWF  - UF as tolerated  - Epogen as needed  - Renal ADA diet + protein supplements  - Oral PO4- binders  - Apixaban for PAF as per Cardiology  - Glycemic control and other problems per admitting        Will follow on Dialysis days.

## 2024-05-15 NOTE — ASSESSMENT & PLAN NOTE
Patient's FSGs are controlled on current medication regimen.  Last A1c reviewed-   Lab Results   Component Value Date    HGBA1C 5.8 (H) 03/20/2024     Most recent fingerstick glucose reviewed-   Recent Labs   Lab 05/14/24  1549 05/14/24  1940   POCTGLUCOSE 95 156*       Current correctional scale  Medium  Maintain anti-hyperglycemic dose as follows-   Antihyperglycemics (From admission, onward)    Start     Stop Route Frequency Ordered    05/10/24 1858  insulin aspart U-100 pen 0-10 Units         -- SubQ Before meals & nightly PRN 05/10/24 8755

## 2024-05-15 NOTE — PLAN OF CARE
Per MD, angiogram scheduled for tomorrow. CM will follow-up as needed. ID placed a consult to vascular and orthopedics.      05/15/24 4328   Discharge Reassessment   Assessment Type Discharge Planning Reassessment   Did the patient's condition or plan change since previous assessment? Yes   Discharge Plan discussed with: Patient   Communicated GARY with patient/caregiver Yes   Discharge Plan A Home with family   Discharge Plan B Other  (TBD)   DME Needed Upon Discharge  none   Transition of Care Barriers None   Why the patient remains in the hospital Requires continued medical care   Post-Acute Status   Coverage Managed Medicare   Hospital Resources/Appts/Education Provided Appointments scheduled and added to AVS   Discharge Delays None known at this time

## 2024-05-15 NOTE — PROGRESS NOTES
Pharmacokinetic Assessment Follow Up: IV Vancomycin    Vancomycin serum concentration assessment(s):    The random level was drawn correctly and can be used to guide therapy at this time. The measurement is within the desired definitive target range of 10 to 20 mcg/mL.    Vancomycin Regimen Plan:    Give 500 mg after dialysis today.  Re-dose when the random level is less than 20 mcg/mL, next level to be drawn at 0400 on 5/17/2024    Drug levels (last 3 results):  Recent Labs   Lab Result Units 05/13/24  0441 05/15/24  0416   Vancomycin, Random ug/mL 17.3 16.7       Pharmacy will continue to follow and monitor vancomycin.    Please contact pharmacy at extension [phone] for questions regarding this assessment.    Thank you for the consult,   Jakob Goldberg Jr       Patient brief summary:  Miguel Moore is a 68 y.o. male initiated on antimicrobial therapy with IV Vancomycin for treatment of skin & soft tissue infection    Drug Allergies:   Review of patient's allergies indicates:   Allergen Reactions    Ace inhibitors      Hyperkalemia 8/2018  Other reaction(s): Unknown    Penicillins Hives     Tolerated cefepime and cefdinir previously    Tizanidine      Felt hot       Actual Body Weight:   77 kg    Renal Function:   Estimated Creatinine Clearance: 9.9 mL/min (A) (based on SCr of 6.8 mg/dL (H)).,     Dialysis Method (if applicable):  intermittent HD    CBC (last 72 hours):  Recent Labs   Lab Result Units 05/13/24  0441 05/13/24  1430 05/14/24  0521 05/14/24  1706 05/15/24  0416   WBC K/uL 3.96 6.06 4.36 4.91 4.44   Hemoglobin g/dL 8.3* 9.3* 7.7* 8.8* 8.6*   Hematocrit % 25.9* 28.9* 24.8* 27.6* 27.8*   Platelets K/uL 181 195 138* 179 208   Gran % % 60.5 69.9 60.1 64.5 61.1   Lymph % % 24.0 16.8* 26.4 21.4 23.9   Mono % % 8.6 8.7 7.8 10.0 9.7   Eosinophil % % 5.8 3.6 4.1 3.3 4.1   Basophil % % 0.8 0.7 0.9 0.6 0.7   Differential Method  Automated Automated Automated Automated Automated       Metabolic Panel (last 72  hours):  Recent Labs   Lab Result Units 05/13/24  0441 05/14/24  2338 05/15/24  0416   Sodium mmol/L 138  --  136   Potassium mmol/L 3.8  --  3.6   Chloride mmol/L 101  --  101   CO2 mmol/L 23  --  24   Glucose mg/dL 112*  --  99   BUN mg/dL 27*  --  21   Creatinine mg/dL 7.5*  --  6.8*   Albumin g/dL 1.8*  --  1.8*   Total Bilirubin mg/dL 0.3  --  0.3   Alkaline Phosphatase U/L 62  --  58   AST U/L 9*  --  10   ALT U/L <5*  --  <5*   Phosphorus mg/dL 5.4* 4.2  --        Vancomycin Administrations:  vancomycin given in the last 96 hours                     vancomycin (VANCOCIN) 500 mg in dextrose 5 % in water (D5W) 100 mL IVPB (MB+) (mg) 500 mg New Bag 05/13/24 1716                    Microbiologic Results:  Microbiology Results (last 7 days)       Procedure Component Value Units Date/Time    Blood culture [[id number]] Collected: 05/11/24 2006    Order Status: Completed Specimen: Blood from Peripheral, Hand, Right Updated: 05/14/24 2303     Blood Culture, Routine No Growth to date      No Growth to date      No Growth to date      No Growth to date    Narrative:      could not draw second set cultures, due to patient being a hard   stick. reported to nurse Gwen    Blood culture [[id number]] Collected: 05/11/24 1335    Order Status: Completed Specimen: Blood Updated: 05/14/24 1703     Blood Culture, Routine No Growth to date      No Growth to date      No Growth to date      No Growth to date    Aerobic culture (Specify Source) **CANNOT BE ORDERED AS STAT** [[id number]] Collected: 05/10/24 1703    Order Status: Completed Specimen: Abscess from Foot, Right Updated: 05/14/24 0723     Aerobic Bacterial Culture No growth    Blood culture #2 [[id number]]  (Abnormal) Collected: 05/10/24 1547    Order Status: Completed Specimen: Blood from Peripheral, Hand, Right Updated: 05/13/24 0948     Blood Culture, Routine Gram stain aer bottle: Gram positive cocci in clusters resembling Staph      Positive results previously  called      COAGULASE-NEGATIVE STAPHYLOCOCCUS SPECIES  Organism is a probable contaminant      Narrative:      Blood Culture #2    Blood culture #1 [[id number]]  (Abnormal) Collected: 05/10/24 1545    Order Status: Completed Specimen: Blood from Peripheral, Antecubital, Right Updated: 05/13/24 0948     Blood Culture, Routine Gram stain aer bottle: Gram positive cocci in clusters resembling Staph      Results called to and read back by: Gwen Staley 05/11/2024  09:56      COAGULASE-NEGATIVE STAPHYLOCOCCUS SPECIES  Organism is a probable contaminant      Narrative:      Blood Culture #1

## 2024-05-15 NOTE — ASSESSMENT & PLAN NOTE
Chronic, controlled. Latest blood pressure and vitals reviewed-     Temp:  [97.4 °F (36.3 °C)-98.2 °F (36.8 °C)]   Pulse:  [62-70]   Resp:  [17-18]   BP: (120-169)/(59-74)   SpO2:  [97 %-100 %] .   Home meds for hypertension were reviewed and noted below.   Hypertension Medications               labetaloL (NORMODYNE) 100 MG tablet Take 1 tablet (100 mg total) by mouth once daily.            While in the hospital, will manage blood pressure as follows; Continue home antihypertensive regimen    Will utilize p.r.n. blood pressure medication only if patient's blood pressure greater than 180/110 and he develops symptoms such as worsening chest pain or shortness of breath.

## 2024-05-15 NOTE — HPI
69 yo male with ESRD on HD, DM and CVA with R sided weakness with recent admission for R ankle OM (maintained on vanc/tara x 6w, ruben 5/20) admitted for unhealing R wound. ID consulted for abx recs. Hospital course notable for blood cx with CONS, suspected contaminant, repeat blcx ngtd. He is pending angio with vascular and evaluation by ortho for potential surgical disposition. Prior to admission, he denied fevers or chills.

## 2024-05-15 NOTE — ASSESSMENT & PLAN NOTE
I independently reviewed patient's lab work and images as documented. 67 yo male with ESRD on HD, DM and CVA with R sided weakness with recent admission for R ankle OM (maintained on vanc/ancef x 6w, ruben 5/20) admitted for unhealing R wound. ID consulted for abx recs. Hospital course notable for blood cx with CONS, suspected contaminant, repeat blcx ngtd. He is pending angio with vascular and evaluation by ortho for potential surgical disposition. Prior to admission, he denied fevers or chills. Suspect poor wound healing due to decreased blood flow.     Recommendations:  -de-escalate meropenem to ancef (ordered) to target prior isolated organism  -no new culture data this admission of R foot and pt is hemodynamically stable   -continue vancomycin pharm to dose   -follow up vascular and ortho recs

## 2024-05-15 NOTE — SUBJECTIVE & OBJECTIVE
Interval History:  No acute overnight events.  Patient remained afebrile.  Angiogram cancel yesterday by vascular due to OR availability. Plans for right lower extremity angiogram tomorrow with vascular surgery.  Currently denies any pain    Review of Systems   Constitutional:  Negative for chills and fever.   Respiratory:  Negative for shortness of breath.    Cardiovascular:  Negative for chest pain.   Gastrointestinal:  Negative for abdominal pain.   Skin:  Positive for wound.   Psychiatric/Behavioral:  Negative for confusion.      Objective:     Vital Signs (Most Recent):  Temp: 97.4 °F (36.3 °C) (05/15/24 0758)  Pulse: 62 (05/15/24 0758)  Resp: 18 (05/15/24 0758)  BP: (!) 160/73 (05/15/24 0758)  SpO2: 100 % (05/15/24 0758) Vital Signs (24h Range):  Temp:  [97.4 °F (36.3 °C)-98.2 °F (36.8 °C)] 97.4 °F (36.3 °C)  Pulse:  [62-70] 62  Resp:  [17-18] 18  SpO2:  [97 %-100 %] 100 %  BP: (120-169)/(59-74) 160/73     Weight: 77 kg (169 lb 12.1 oz)  Body mass index is 25.81 kg/m².    Intake/Output Summary (Last 24 hours) at 5/15/2024 1328  Last data filed at 5/15/2024 0505  Gross per 24 hour   Intake 240 ml   Output --   Net 240 ml           Physical Exam  Vitals and nursing note reviewed.   Constitutional:       General: He is not in acute distress.     Appearance: He is not ill-appearing or toxic-appearing.   HENT:      Head: Normocephalic and atraumatic.   Cardiovascular:      Rate and Rhythm: Normal rate and regular rhythm.   Pulmonary:      Effort: Pulmonary effort is normal.      Breath sounds: Normal breath sounds.      Comments: Room air  Abdominal:      General: Bowel sounds are normal. There is no distension.      Palpations: Abdomen is soft.      Tenderness: There is no abdominal tenderness.   Musculoskeletal:      Right lower leg: No edema.      Left lower leg: No edema.   Skin:     Comments: LUE AVF with thrill. Feet are currently dressed   Neurological:      Mental Status: He is alert and oriented to person,  place, and time. Mental status is at baseline.   Psychiatric:         Mood and Affect: Mood normal.         Thought Content: Thought content normal.             Significant Labs: All pertinent labs within the past 24 hours have been reviewed.    Significant Imaging: I have reviewed all pertinent imaging results/findings within the past 24 hours.

## 2024-05-15 NOTE — PLAN OF CARE
Problem: Adult Inpatient Plan of Care  Goal: Optimal Comfort and Wellbeing  Outcome: Progressing     Problem: Infection  Goal: Absence of Infection Signs and Symptoms  Outcome: Progressing     Problem: Diabetes Comorbidity  Goal: Blood Glucose Level Within Targeted Range  Outcome: Progressing     Problem: Wound  Goal: Skin Health and Integrity  Outcome: Progressing  Goal: Optimal Wound Healing  Outcome: Progressing

## 2024-05-15 NOTE — PROGRESS NOTES
PTT 72.4  this morning . Per nomogram rate decreased by 2 units/hr. New rate 12 units/hr. Next PTT scheduled for 3:30 pm . Patient left unit for dialysis .

## 2024-05-15 NOTE — ASSESSMENT & PLAN NOTE
Worsening R foot wounds. Home health nursing noted purulence. No pain.   - blood cultures with Staph epi, coag negative- likely contaminant  - repeat blood cx with NGTD  - continue vanc, given history of ESBL organisms. Meropenem dc on 05/15  - he does not want further surgeries on his foot and is preparing himself mentally for amputation.   - Orthopaedics ([facility]) consulted  - Vascular consulted: plans for RLE angiogram on 05/14  - his L foot also has a wound- XR no osteomyelitis, wound care consulted   - ID consulted   Sandra Cline MD

## 2024-05-16 LAB
APTT PPP: 33.9 SEC (ref 21–32)
APTT PPP: 53.8 SEC (ref 21–32)
BASOPHILS # BLD AUTO: 0.03 K/UL (ref 0–0.2)
BASOPHILS NFR BLD: 0.7 % (ref 0–1.9)
DIFFERENTIAL METHOD BLD: ABNORMAL
EOSINOPHIL # BLD AUTO: 0.2 K/UL (ref 0–0.5)
EOSINOPHIL NFR BLD: 3.5 % (ref 0–8)
ERYTHROCYTE [DISTWIDTH] IN BLOOD BY AUTOMATED COUNT: 18.2 % (ref 11.5–14.5)
HCT VFR BLD AUTO: 28.5 % (ref 40–54)
HGB BLD-MCNC: 8.8 G/DL (ref 14–18)
IMM GRANULOCYTES # BLD AUTO: 0.02 K/UL (ref 0–0.04)
IMM GRANULOCYTES NFR BLD AUTO: 0.5 % (ref 0–0.5)
LYMPHOCYTES # BLD AUTO: 1.2 K/UL (ref 1–4.8)
LYMPHOCYTES NFR BLD: 27 % (ref 18–48)
MCH RBC QN AUTO: 25.6 PG (ref 27–31)
MCHC RBC AUTO-ENTMCNC: 30.9 G/DL (ref 32–36)
MCV RBC AUTO: 83 FL (ref 82–98)
MONOCYTES # BLD AUTO: 0.6 K/UL (ref 0.3–1)
MONOCYTES NFR BLD: 13.1 % (ref 4–15)
NEUTROPHILS # BLD AUTO: 2.4 K/UL (ref 1.8–7.7)
NEUTROPHILS NFR BLD: 55.2 % (ref 38–73)
NRBC BLD-RTO: 0 /100 WBC
PLATELET # BLD AUTO: 180 K/UL (ref 150–450)
PMV BLD AUTO: 9.5 FL (ref 9.2–12.9)
POCT GLUCOSE: 107 MG/DL (ref 70–110)
POCT GLUCOSE: 114 MG/DL (ref 70–110)
POCT GLUCOSE: 158 MG/DL (ref 70–110)
POCT GLUCOSE: 161 MG/DL (ref 70–110)
RBC # BLD AUTO: 3.44 M/UL (ref 4.6–6.2)
WBC # BLD AUTO: 4.29 K/UL (ref 3.9–12.7)

## 2024-05-16 PROCEDURE — 63600175 PHARM REV CODE 636 W HCPCS: Mod: HCNC | Performed by: SURGERY

## 2024-05-16 PROCEDURE — 85730 THROMBOPLASTIN TIME PARTIAL: CPT | Mod: HCNC | Performed by: STUDENT IN AN ORGANIZED HEALTH CARE EDUCATION/TRAINING PROGRAM

## 2024-05-16 PROCEDURE — 36415 COLL VENOUS BLD VENIPUNCTURE: CPT | Mod: HCNC | Performed by: STUDENT IN AN ORGANIZED HEALTH CARE EDUCATION/TRAINING PROGRAM

## 2024-05-16 PROCEDURE — 25000003 PHARM REV CODE 250: Mod: HCNC | Performed by: SURGERY

## 2024-05-16 PROCEDURE — 63600175 PHARM REV CODE 636 W HCPCS: Mod: HCNC | Performed by: HOSPITALIST

## 2024-05-16 PROCEDURE — 37224 PR FEM/POPL REVAS W/TLA: CPT | Mod: HCNC,RT,, | Performed by: SURGERY

## 2024-05-16 PROCEDURE — 99152 MOD SED SAME PHYS/QHP 5/>YRS: CPT | Mod: HCNC,,, | Performed by: SURGERY

## 2024-05-16 PROCEDURE — 85025 COMPLETE CBC W/AUTO DIFF WBC: CPT | Mod: HCNC | Performed by: STUDENT IN AN ORGANIZED HEALTH CARE EDUCATION/TRAINING PROGRAM

## 2024-05-16 PROCEDURE — 99233 SBSQ HOSP IP/OBS HIGH 50: CPT | Mod: 25,HCNC,95,GC | Performed by: NURSE PRACTITIONER

## 2024-05-16 PROCEDURE — 25500020 PHARM REV CODE 255: Mod: HCNC | Performed by: STUDENT IN AN ORGANIZED HEALTH CARE EDUCATION/TRAINING PROGRAM

## 2024-05-16 PROCEDURE — 75710 ARTERY X-RAYS ARM/LEG: CPT | Mod: 26,59,HCNC, | Performed by: SURGERY

## 2024-05-16 PROCEDURE — 37224 HC FEM/POPL REVAS W/TLA: CPT | Mod: HCNC | Performed by: SURGERY

## 2024-05-16 PROCEDURE — B41F1ZZ FLUOROSCOPY OF RIGHT LOWER EXTREMITY ARTERIES USING LOW OSMOLAR CONTRAST: ICD-10-PCS | Performed by: SURGERY

## 2024-05-16 PROCEDURE — 75710 ARTERY X-RAYS ARM/LEG: CPT | Mod: HCNC | Performed by: SURGERY

## 2024-05-16 PROCEDURE — 25000003 PHARM REV CODE 250: Mod: HCNC | Performed by: HOSPITALIST

## 2024-05-16 PROCEDURE — 047M3ZZ DILATION OF RIGHT POPLITEAL ARTERY, PERCUTANEOUS APPROACH: ICD-10-PCS | Performed by: SURGERY

## 2024-05-16 PROCEDURE — 25000003 PHARM REV CODE 250: Mod: HCNC | Performed by: STUDENT IN AN ORGANIZED HEALTH CARE EDUCATION/TRAINING PROGRAM

## 2024-05-16 PROCEDURE — 11000001 HC ACUTE MED/SURG PRIVATE ROOM: Mod: HCNC

## 2024-05-16 PROCEDURE — B4101ZZ FLUOROSCOPY OF ABDOMINAL AORTA USING LOW OSMOLAR CONTRAST: ICD-10-PCS | Performed by: SURGERY

## 2024-05-16 PROCEDURE — 63600175 PHARM REV CODE 636 W HCPCS: Mod: HCNC | Performed by: STUDENT IN AN ORGANIZED HEALTH CARE EDUCATION/TRAINING PROGRAM

## 2024-05-16 RX ORDER — HEPARIN SODIUM 1000 [USP'U]/ML
INJECTION, SOLUTION INTRAVENOUS; SUBCUTANEOUS
Status: COMPLETED | OUTPATIENT
Start: 2024-05-16 | End: 2024-05-16

## 2024-05-16 RX ORDER — MIDAZOLAM HYDROCHLORIDE 1 MG/ML
INJECTION, SOLUTION INTRAMUSCULAR; INTRAVENOUS
Status: COMPLETED | OUTPATIENT
Start: 2024-05-16 | End: 2024-05-16

## 2024-05-16 RX ORDER — FENTANYL CITRATE 50 UG/ML
INJECTION, SOLUTION INTRAMUSCULAR; INTRAVENOUS
Status: COMPLETED | OUTPATIENT
Start: 2024-05-16 | End: 2024-05-16

## 2024-05-16 RX ORDER — LIDOCAINE HYDROCHLORIDE 10 MG/ML
INJECTION INFILTRATION; PERINEURAL
Status: COMPLETED | OUTPATIENT
Start: 2024-05-16 | End: 2024-05-16

## 2024-05-16 RX ORDER — PROTAMINE SULFATE 10 MG/ML
INJECTION, SOLUTION INTRAVENOUS
Status: COMPLETED | OUTPATIENT
Start: 2024-05-16 | End: 2024-05-16

## 2024-05-16 RX ADMIN — FENTANYL CITRATE 25 MCG: 50 INJECTION INTRAMUSCULAR; INTRAVENOUS at 12:05

## 2024-05-16 RX ADMIN — FENTANYL CITRATE 25 MCG: 50 INJECTION INTRAMUSCULAR; INTRAVENOUS at 11:05

## 2024-05-16 RX ADMIN — HEPARIN SODIUM 8000 UNITS: 1000 INJECTION, SOLUTION INTRAVENOUS; SUBCUTANEOUS at 12:05

## 2024-05-16 RX ADMIN — INSULIN ASPART 1 UNITS: 100 INJECTION, SOLUTION INTRAVENOUS; SUBCUTANEOUS at 08:05

## 2024-05-16 RX ADMIN — LIDOCAINE HYDROCHLORIDE 10 ML: 10 INJECTION, SOLUTION INFILTRATION; PERINEURAL at 11:05

## 2024-05-16 RX ADMIN — DEXTROSE MONOHYDRATE 1 G: 2.5 INJECTION INTRAVENOUS at 08:05

## 2024-05-16 RX ADMIN — PROTAMINE SULFATE 45 MG: 10 INJECTION, SOLUTION INTRAVENOUS at 12:05

## 2024-05-16 RX ADMIN — PROTAMINE SULFATE 5 MG: 10 INJECTION, SOLUTION INTRAVENOUS at 12:05

## 2024-05-16 RX ADMIN — POLYETHYLENE GLYCOL 3350 17 G: 17 POWDER, FOR SOLUTION ORAL at 08:05

## 2024-05-16 RX ADMIN — IOHEXOL 70 ML: 350 INJECTION, SOLUTION INTRAVENOUS at 12:05

## 2024-05-16 RX ADMIN — FINASTERIDE 5 MG: 5 TABLET, FILM COATED ORAL at 08:05

## 2024-05-16 RX ADMIN — ASPIRIN 81 MG: 81 TABLET, COATED ORAL at 08:05

## 2024-05-16 RX ADMIN — ATORVASTATIN CALCIUM 80 MG: 40 TABLET, FILM COATED ORAL at 08:05

## 2024-05-16 RX ADMIN — LABETALOL HYDROCHLORIDE 100 MG: 100 TABLET, FILM COATED ORAL at 08:05

## 2024-05-16 RX ADMIN — HEPARIN SODIUM 12 UNITS/KG/HR: 10000 INJECTION, SOLUTION INTRAVENOUS at 08:05

## 2024-05-16 RX ADMIN — MIDAZOLAM 0.5 MG: 1 INJECTION INTRAMUSCULAR; INTRAVENOUS at 11:05

## 2024-05-16 RX ADMIN — TAMSULOSIN HYDROCHLORIDE 0.8 MG: 0.4 CAPSULE ORAL at 08:05

## 2024-05-16 RX ADMIN — Medication 1 CAPSULE: at 08:05

## 2024-05-16 NOTE — PLAN OF CARE
Problem: Infection  Goal: Absence of Infection Signs and Symptoms  Outcome: Progressing     Problem: Diabetes Comorbidity  Goal: Blood Glucose Level Within Targeted Range  Outcome: Progressing     Problem: Wound  Goal: Optimal Coping  Outcome: Progressing  Goal: Optimal Wound Healing  Outcome: Progressing

## 2024-05-16 NOTE — SUBJECTIVE & OBJECTIVE
Interval History:  No acute overnight events.  Patient remained afebrile.vascular. Plans for right lower extremity angiogram today.  Currently denies any pain    Review of Systems   Constitutional:  Negative for chills and fever.   Respiratory:  Negative for shortness of breath.    Cardiovascular:  Negative for chest pain.   Gastrointestinal:  Negative for abdominal pain.   Skin:  Positive for wound.   Psychiatric/Behavioral:  Negative for confusion.      Objective:     Vital Signs (Most Recent):  Temp: 97.7 °F (36.5 °C) (05/16/24 1255)  Pulse: 66 (05/16/24 1310)  Resp: 16 (05/16/24 1310)  BP: (!) 122/59 (05/16/24 1310)  SpO2: 99 % (05/16/24 1310) Vital Signs (24h Range):  Temp:  [97.5 °F (36.4 °C)-98.9 °F (37.2 °C)] 97.7 °F (36.5 °C)  Pulse:  [60-69] 66  Resp:  [12-18] 16  SpO2:  [98 %-100 %] 99 %  BP: (112-222)/(53-97) 122/59     Weight: 77 kg (169 lb 12.1 oz)  Body mass index is 25.81 kg/m².    Intake/Output Summary (Last 24 hours) at 5/16/2024 1418  Last data filed at 5/15/2024 2100  Gross per 24 hour   Intake 120 ml   Output 3000 ml   Net -2880 ml           Physical Exam  Vitals and nursing note reviewed.   Constitutional:       General: He is not in acute distress.     Appearance: He is not ill-appearing or toxic-appearing.   HENT:      Head: Normocephalic and atraumatic.   Cardiovascular:      Rate and Rhythm: Normal rate and regular rhythm.   Pulmonary:      Effort: Pulmonary effort is normal.      Breath sounds: Normal breath sounds.      Comments: Room air  Abdominal:      General: Bowel sounds are normal. There is no distension.      Palpations: Abdomen is soft.      Tenderness: There is no abdominal tenderness.   Musculoskeletal:      Right lower leg: No edema.      Left lower leg: No edema.   Skin:     Comments: LUE AVF with thrill. Feet are currently dressed   Neurological:      Mental Status: He is alert and oriented to person, place, and time. Mental status is at baseline.   Psychiatric:         Mood  and Affect: Mood normal.         Thought Content: Thought content normal.             Significant Labs: All pertinent labs within the past 24 hours have been reviewed.    Significant Imaging: I have reviewed all pertinent imaging results/findings within the past 24 hours.

## 2024-05-16 NOTE — ASSESSMENT & PLAN NOTE
Patient with Paroxysmal (<7 days) atrial fibrillation which is controlled currently with Beta Blocker. Patient is currently in sinus rhythm.[id number] Score: 3. Anticoagulation indicated. Anticoagulation done with apixaban .    -hold apixaban for plan angiogram on 05/16

## 2024-05-16 NOTE — NURSING
[facility] [place]  Nurses Note -- 4 Eyes      5/16/2024       Skin assessed on: Q Shift      [] No Pressure Injuries Present    []Prevention Measures Documented    [x] Yes LDA  for Pressure Injury Previously documented     [] Yes New Pressure Injury Discovered   [] LDA for New Pressure Injury Added      Attending RN:  Aliza Mcdaniels RN     Second RN:  DARRION Dillard

## 2024-05-16 NOTE — ASSESSMENT & PLAN NOTE
Patient's anemia is currently controlled. Has not received any PRBCs to date. Etiology likely d/t chronic disease due to ESRD  Current CBC reviewed-   Lab Results   Component Value Date    HGB 8.8 (L) 05/16/2024    HCT 28.5 (L) 05/16/2024     Monitor serial CBC and transfuse if patient becomes hemodynamically unstable, symptomatic or H/H drops below 7/21.

## 2024-05-16 NOTE — PROGRESS NOTES
Vancomycin consult follow-up:    Patient reviewed, dialysis scheduled every Mon, Wed, Fri, no new levels, continue current therapy; Next levels due: random due 5/17/2024 at 0400

## 2024-05-16 NOTE — PLAN OF CARE
Procedure completed, pt tolerated well. No apparent distress noted. Mynx closure device deployed to L groin, dressing applied CDI. Per MD, patient to lay flat for 3 hrs. Patient to be transferred back to room.

## 2024-05-16 NOTE — PROGRESS NOTES
[facility] Medicine  Progress Note    Patient Name: Miguel Mooer  MRN: [id number]  Patient Class: IP- Inpatient   Admission Date: 5/10/2024  Length of Stay: 6 days  Attending Physician: Pepe Martino DO  Primary Care Provider: Raciel Raymond MD        Subjective:     Principal Problem:Open wound of right foot        HPI:  68 y.o. male with hypertension, BPH, chronic right lower extremity DVT, DM2, dyslipidemia, ESRD on HD, hemiplegia and hemiparesis following cerebral infarction affecting the right dominant side, paroxysmal AFib, seizure disorder as sequela of CVA sent to the ED from wound care for new wound infection.  Denies fever, chills, cough, shortness breath, chest pain, dizziness, syncope.  Was hospitalized in March for cellulitis and abscess to the right foot with surgical intervention at that time.  In the ED today, CRP and procalcitonin elevated.  Blood and wound cultures were obtained.  IV antibiotics initiated.    Overview/Hospital Course:  67yo M ESRD, PAD, R foot wounds s/p multiple surgeries admitted w worsening wound. Did have Staph epi in blood culture, likely contaminant. He does not want further debridement, wants BKA if needed. Started vancomycin and meropenem given his history of ESBL organisms. [facility]o [facility] consulted. Vascular consulted- plan for RLE angiogram on 05/16 . Nephro following for HD. He also has L heel dark area, XR left heel with Osseous structures demonstrate no evidence for acute fracture or osseous destructive lesion.  Mild diffuse demineralization. wound care consulted.       Interval History:  No acute overnight events.  Patient remained afebrile.vascular. Plans for right lower extremity angiogram today.  Currently denies any pain    Review of Systems   Constitutional:  Negative for chills and fever.   Respiratory:  Negative for shortness of breath.    Cardiovascular:  Negative for chest pain.   Gastrointestinal:  Negative for abdominal  pain.   Skin:  Positive for wound.   Psychiatric/Behavioral:  Negative for confusion.      Objective:     Vital Signs (Most Recent):  Temp: 97.7 °F (36.5 °C) (05/16/24 1255)  Pulse: 66 (05/16/24 1310)  Resp: 16 (05/16/24 1310)  BP: (!) 122/59 (05/16/24 1310)  SpO2: 99 % (05/16/24 1310) Vital Signs (24h Range):  Temp:  [97.5 °F (36.4 °C)-98.9 °F (37.2 °C)] 97.7 °F (36.5 °C)  Pulse:  [60-69] 66  Resp:  [12-18] 16  SpO2:  [98 %-100 %] 99 %  BP: (112-222)/(53-97) 122/59     Weight: 77 kg (169 lb 12.1 oz)  Body mass index is 25.81 kg/m².    Intake/Output Summary (Last 24 hours) at 5/16/2024 1418  Last data filed at 5/15/2024 2100  Gross per 24 hour   Intake 120 ml   Output 3000 ml   Net -2880 ml           Physical Exam  Vitals and nursing note reviewed.   Constitutional:       General: He is not in acute distress.     Appearance: He is not ill-appearing or toxic-appearing.   HENT:      Head: Normocephalic and atraumatic.   Cardiovascular:      Rate and Rhythm: Normal rate and regular rhythm.   Pulmonary:      Effort: Pulmonary effort is normal.      Breath sounds: Normal breath sounds.      Comments: Room air  Abdominal:      General: Bowel sounds are normal. There is no distension.      Palpations: Abdomen is soft.      Tenderness: There is no abdominal tenderness.   Musculoskeletal:      Right lower leg: No edema.      Left lower leg: No edema.   Skin:     Comments: LUE AVF with thrill. Feet are currently dressed   Neurological:      Mental Status: He is alert and oriented to person, place, and time. Mental status is at baseline.   Psychiatric:         Mood and Affect: Mood normal.         Thought Content: Thought content normal.             Significant Labs: All pertinent labs within the past 24 hours have been reviewed.    Significant Imaging: I have reviewed all pertinent imaging results/findings within the past 24 hours.    Assessment/Plan:      * Open wound of right foot  Worsening R foot wounds. Home health  nursing noted purulence. No pain.   - blood cultures with Staph epi, coag negative- likely contaminant  - repeat blood cx with NGTD  - continue vanc, given history of ESBL organisms. Meropenem dc on 05/15  - he does not want further surgeries on his foot and is preparing himself mentally for amputation.   - Orthopaedics ([facility]) consulted  - Vascular consulted: plans for RLE angiogram on 05/16  - his L foot also has a wound- XR no osteomyelitis, wound care consulted   - ID consulted    Subacute osteomyelitis of right foot  -Continue vancomycin and ancef  -infectious disease consulted      Peripheral artery disease  3/20/24 RLE arterial US Arterial vasculature of the right lower extremity is patent.  However, there is evidence of atherosclerotic change with stenosis at the level of popliteal artery.   - continue asa  - ok to resume statin now that LFTs are normal  - Vascular consulted:  Plan for right lower extremity angiogram on 05/16      Paroxysmal atrial fibrillation  Patient with Paroxysmal (<7 days) atrial fibrillation which is controlled currently with Beta Blocker. Patient is currently in sinus rhythm.[id number] Score: 3. Anticoagulation indicated. Anticoagulation done with apixaban .    -hold apixaban for plan angiogram on 05/16    ESRD (end stage renal disease)  Receives HD MWF via LUE AVF. Has not missed any sessions.   - Nephrology consulted  - appears euvolemic, dialysis per Nephrology    Anemia in chronic kidney disease  Patient's anemia is currently controlled. Has not received any PRBCs to date. Etiology likely d/t chronic disease due to ESRD  Current CBC reviewed-   Lab Results   Component Value Date    HGB 8.8 (L) 05/16/2024    HCT 28.5 (L) 05/16/2024     Monitor serial CBC and transfuse if patient becomes hemodynamically unstable, symptomatic or H/H drops below 7/21.    Secondary hyperparathyroidism of renal origin  Continue sevelamer      Benign essential HTN  Chronic, controlled.  Latest blood pressure and vitals reviewed-     Temp:  [97.4 °F (36.3 °C)-98.2 °F (36.8 °C)]   Pulse:  [62-70]   Resp:  [17-18]   BP: (120-169)/(59-74)   SpO2:  [97 %-100 %] .   Home meds for hypertension were reviewed and noted below.   Hypertension Medications               labetaloL (NORMODYNE) 100 MG tablet Take 1 tablet (100 mg total) by mouth once daily.            While in the hospital, will manage blood pressure as follows; Continue home antihypertensive regimen    Will utilize p.r.n. blood pressure medication only if patient's blood pressure greater than 180/110 and he develops symptoms such as worsening chest pain or shortness of breath.    Dyslipidemia  Not currently on statin due to previously elevated LFTs.   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  Last A1c reviewed-   Lab Results   Component Value Date    HGBA1C 5.8 (H) 03/20/2024     Most recent fingerstick glucose reviewed-   Recent Labs   Lab 05/15/24  1641 05/15/24  2013 05/16/24  0752   POCTGLUCOSE 175* 137* 107       Current correctional scale  Medium  Maintain anti-hyperglycemic dose as follows-   Antihyperglycemics (From admission, onward)      Start     Stop Route Frequency Ordered    05/10/24 1858  insulin aspart U-100 pen 0-10 Units         -- SubQ Before meals & nightly PRN 05/10/24 1758            BPH (benign prostatic hyperplasia) 2/18/21 prostate bx normal  Continue home regimen of tamsulosin and finasteride      VTE Risk Mitigation (From admission, onward)            Ordered     heparin 25,000 units in dextrose 5% (100 units/ml) IV bolus from bag HIGH INTENSITY nomogram - [facility]  As needed (PRN)        Question:  Heparin Infusion Adjustment (DO NOT MODIFY ANSWER)  Answer:  [email or web address]\epic\Images\Pharmacy\HeparinInfusions\heparin HIGH INTENSITY nomogram for [facility] [id number]    05/14/24 1642     heparin 25,000 units in dextrose 5% (100 units/ml) IV bolus from bag HIGH INTENSITY nomogram - [facility]  As needed (PRN)        Question:  Heparin Infusion Adjustment (DO NOT MODIFY ANSWER)  Answer:  \\[email or web address]\epic\Images\Pharmacy\HeparinInfusions\heparin HIGH INTENSITY nomogram for [facility] [id number]    05/14/24 1642     heparin 25,000 units in dextrose 5% 250 mL (100 units/mL) infusion HIGH INTENSITY nomogram - [facility]  Continuous        Question:  Begin at (units/kg/hr)  Answer:  18 05/14/24 1642     heparin 25,000 units in dextrose 5% 250 mL (100 units/mL) infusion HIGH INTENSITY nomogram - [facility]  Continuous        Question:  Begin at (units/kg/hr)  Answer:  18 05/13/24 1416     IP VTE HIGH RISK PATIENT  Once         05/10/24 1758     Place sequential compression device  Until discontinued         05/10/24 1758     Reason for No Pharmacological VTE Prophylaxis  Once        Question:  Reasons:  Answer:  Already adequately anticoagulated on oral Anticoagulants    05/10/24 1758                    Discharge Planning   GARY:      Code Status: Full Code   Is the patient medically ready for discharge?:     Reason for patient still in hospital (select all that apply): Patient trending condition, Treatment, and Consult recommendations  Discharge Plan A: Home with family   Discharge Delays: None known at this time              Pepe Martino DO  Department of Hospital Medicine   [facility] [place] - Observation

## 2024-05-16 NOTE — PLAN OF CARE
Problem: Infection  Goal: Absence of Infection Signs and Symptoms  Outcome: Progressing  Intervention: Prevent or Manage Infection  Flowsheets (Taken 5/16/2024 1431)  Fever Reduction/Comfort Measures:   lightweight clothing   lightweight bedding     Problem: Diabetes Comorbidity  Goal: Blood Glucose Level Within Targeted Range  Outcome: Progressing  Intervention: Monitor and Manage Glycemia  Flowsheets (Taken 5/16/2024 1431)  Glycemic Management: blood glucose monitored     Problem: Wound  Goal: Optimal Coping  Outcome: Progressing  Intervention: Support Patient and Family Response  Flowsheets (Taken 5/16/2024 1431)  Supportive Measures:   positive reinforcement provided   relaxation techniques promoted

## 2024-05-16 NOTE — OP NOTE
Date: 5/16/24    Surgeon: Dipak Lim MD RPVI     Assistant: Nga Hendrix MS     Pre-op Diagnosis:   1. Atherosclerosis right lower extremity with foot wound  2.   Patient Active Problem List    Diagnosis Date Noted    Subacute osteomyelitis of right foot 05/13/2024    Atherosclerosis of native artery of right lower extremity with ulceration 05/13/2024    Open wound of right foot 05/10/2024    Peripheral artery disease 04/25/2024    Abdominal aortic atherosclerosis on CT 4/1/24 04/25/2024    Open wound 03/26/2024    Paroxysmal atrial fibrillation 03/20/2024    Bilateral posterior capsular opacification 05/02/2023    Pseudophakia 01/06/2022    History of diabetic ulcer of foot     Elevated PSA 2/18/21 prostate bx normal 02/18/2021    Pulmonary nodule 1/2021 4 mm nodule. 01/06/2021    Chronic deep vein thrombosis (DVT) of popliteal vein of right lower extremity 9/21/20 outside US; unprovoked; anticoagulation 09/21/2020    History of fungal infection candida parasilosis hospitalization 8/2020 08/29/2020    Anemia in chronic kidney disease 08/26/2020    Type 2 diabetes mellitus with diabetic neuropathy, with long-term current use of insulin 05/10/2018    History of stroke 12/04/2017    CME (cystoid macular edema), bilateral 11/16/2017    Refractive error 11/16/2017    Senile nuclear sclerosis 10/05/2017    Right sided weakness 09/11/2017    Secondary hyperparathyroidism of renal origin 08/03/2017    Vitamin D deficiency 08/03/2017    Nuclear sclerosis, left 06/09/2017    Cortical cataract of both eyes 06/09/2017    DM type 2 without retinopathy 06/09/2017    Microcytosis 9/2019 labs c/w AoCD and AoCKD 03/22/2017    ESRD (end stage renal disease) 03/22/2017    Benign essential HTN 03/21/2017    Dyslipidemia 03/21/2017    BPH (benign prostatic hyperplasia) 2/18/21 prostate bx normal 03/21/2017    Seizure disorder as sequela of cerebrovascular accident 03/21/2017    Hemiplegia and hemiparesis following cerebral  infarction affecting right dominant side 03/21/2017       Post-op Diagnosis: Same     Procedures:   1. US guided L CFA percutaneous access  2. L femoral angiogram  3. Aortogram  4. Moderate sedation  5. RLE angiogram  6. Balloon angioplasty of the popliteal artery with 5x60 Ultraverse balloon  7. 5fr Mynx     Anesthesia: Local     EBL: Minimal     Findings: Greater than 75% stenosis of the right popliteal artery.  Successful revascularization / resolution of stenosis     Complications:  None; patient tolerated the procedure well.     Disposition: Recovery- hemodynamically stable in good condition     Attending Attestation: I was present and scrubbed for the entire procedure.  After an informed consent was obtained the patient was brought to the interventional suite and placed in the supine position. Both the patient and procedure were confirmed and identified during timeout process. The patient received perioperative antibiotics. The patient's bilateral groins were prepped and draped in usual sterile fashion. Using ultrasound guidance, the L common femoral artery vessel patency was confirmed. Then direct ultrasound guidance the L CFA was entered with a 21-G needle, Mandril wire and 4-Fr micropuncture needle. Then under fluoroscopic guidance, an 0.035-in wire was placed into the distal aorta. The micropuncture dilator was then exchanged over the wire for a 6-Arabic sheath. Sheathogram demonstrated access in the CFA. Next a VCF-catheter was placed over the wire and into the distal aorta under fluoroscopy and an aortogram was performed.  The common iliac artery was then selected with the wire and the catheter was advanced into the external iliac artery over the wire and a leg angiogram was performed which demonstrated the above findings.    Based on the images from this diagnostic angio, a decision was made to intervene.    We had systemically heparinized the patient with 8000u of heparin.   Next, we placed a  up-and-over sheath and used a 0.35 stiff angled Glide wire was used to select the popliteal artery.   Treatment of the popliteal artery was done with the balloon(s) listed above. A follow-up angiogram showed resolution of the lesions. Heparin was reversed with protamine. We then deployed 5fr Mynx closure device without issue. At the conclusion of the case the patient was noted to have no evidence of a groin hematoma. All instrument and sponge counts were correct at the end of the case. The patient tolerated the procedure well and was transferred to the pacu for further recovery.      MODERATE SEDATION   I was present for moderate sedation and monitored the patients cardio respiratory functions during the procedure. See nurses notes for Intra-service start and end times and for the log of the medications, doseage and route.     Time of sedation:  60 mins.

## 2024-05-16 NOTE — PLAN OF CARE
Successful right popliteal angioplasty with improved flow to RLE and foot.  Will check post procedure flow with LOBO and toe pressures to evaluate healing potential.  Continue wound care, offloading and optimization of comorbid conditions.  Nutrition optimization.  Will continue to follow.

## 2024-05-16 NOTE — BRIEF OP NOTE
[facility] - [place] - Interventional Radiology  Brief Operative Note     Surgery Date: 5/16/24     Surgeon: Dipak Lim MD     Assisting: Nga Hendrix MS3     Pre-op Diagnosis:  Critical limb ischemia RLE     Post-op Diagnosis:  same     Procedure:R popliteal artery PTA 5x60 Ultraverse balloon     Anesthesia: Moderate sedation     Operative Findings: improved flow     Estimated Blood Loss: <10 cc         Specimens:   Specimen (24h ago, onward)        None

## 2024-05-16 NOTE — PLAN OF CARE
05/16/24 1455   Medicare Message   Important Message from Medicare regarding Discharge Appeal Rights Given to patient/caregiver;Explained to patient/caregiver;Signed/date by patient/caregiver   Date IMM was signed 05/16/24   Time IMM was signed 0926

## 2024-05-16 NOTE — ASSESSMENT & PLAN NOTE
Worsening R foot wounds. Home health nursing noted purulence. No pain.   - blood cultures with Staph epi, coag negative- likely contaminant  - repeat blood cx with NGTD  - continue vanc, given history of ESBL organisms. Meropenem dc on 05/15  - he does not want further surgeries on his foot and is preparing himself mentally for amputation.   - Orthopaedics ([facility]) consulted  - Vascular consulted: plans for RLE angiogram on 05/16  - his L foot also has a wound- XR no osteomyelitis, wound care consulted   - ID consulted

## 2024-05-16 NOTE — PLAN OF CARE
Pt tolerated procedure without s/sx of complication. Report called to DARRION Abrams. Pt transported back to inpatient room on bed per IR staff.

## 2024-05-16 NOTE — ASSESSMENT & PLAN NOTE
Patient's FSGs are controlled on current medication regimen.  Last A1c reviewed-   Lab Results   Component Value Date    HGBA1C 5.8 (H) 03/20/2024     Most recent fingerstick glucose reviewed-   Recent Labs   Lab 05/15/24  1641 05/15/24  2013 05/16/24  0752   POCTGLUCOSE 175* 137* 107       Current correctional scale  Medium  Maintain anti-hyperglycemic dose as follows-   Antihyperglycemics (From admission, onward)    Start     Stop Route Frequency Ordered    05/10/24 1858  insulin aspart U-100 pen 0-10 Units         -- SubQ Before meals & nightly PRN 05/10/24 1888

## 2024-05-16 NOTE — PROGRESS NOTES
[facility] [place] [facility]  Vascular Surgery  Progress Note    Patient Name: Miguel Moore  MRN: [id number]  Admission Date: 5/10/2024  Primary Care Provider: Raciel Raymond MD    Subjective:     Interval History: No new issues.  Mallampati Scale Class 2   ASA Classification Class III     Sedation Plan: Moderate sedation      Post-Op Info:  * No surgery found *          Medications:  Continuous Infusions:   heparin (porcine) in D5W  0-40 Units/kg/hr (Adjusted) Intravenous Continuous   Stopped at 05/16/24 0600     Scheduled Meds:   aspirin  81 mg Oral Daily    atorvastatin  80 mg Oral Daily    ceFAZolin (Ancef) IV (PEDS and ADULTS)  1 g Intravenous Daily    epoetin marisol-epbx  10,000 Units Subcutaneous Every Mon, Wed, Fri    finasteride  5 mg Oral Daily    labetaloL  100 mg Oral Daily    mupirocin   Nasal BID    polyethylene glycol  17 g Oral BID    sevelamer carbonate  800 mg Oral TID WM    tamsulosin  2 capsule Oral Daily    vitamin renal formula (B-complex-vitamin c-folic acid)  1 capsule Oral Daily     PRN Meds:  Current Facility-Administered Medications:     acetaminophen, 650 mg, Oral, Q6H PRN    albuterol-ipratropium, 3 mL, Nebulization, Q4H PRN    aluminum-magnesium hydroxide-simethicone, 30 mL, Oral, QID PRN    bisacodyL, 10 mg, Rectal, Daily PRN    dextrose 10%, 12.5 g, Intravenous, PRN    dextrose 10%, 25 g, Intravenous, PRN    glucagon (human recombinant), 1 mg, Intramuscular, PRN    glucose, 16 g, Oral, PRN    glucose, 24 g, Oral, PRN    heparin (PORCINE), 60 Units/kg (Adjusted), Intravenous, PRN    heparin (PORCINE), 30 Units/kg (Adjusted), Intravenous, PRN    insulin aspart U-100, 0-10 Units, Subcutaneous, QID (AC + HS) PRN    melatonin, 6 mg, Oral, Nightly PRN    naloxone, 0.02 mg, Intravenous, PRN    ondansetron, 4 mg, Intravenous, Q8H PRN    oxyCODONE-acetaminophen, 1 tablet, Oral, Q8H PRN    prochlorperazine, 5 mg, Intravenous, Q6H PRN    simethicone, 1 tablet, Oral, QID PRN    sodium chloride 0.9%,  250 mL, Intravenous, PRN    sodium chloride 0.9%, 10 mL, Intravenous, Q8H PRN    Pharmacy to dose Vancomycin consult, , , Once **AND** vancomycin - pharmacy to dose, , Intravenous, pharmacy to manage frequency     Objective:     Vital Signs (Most Recent):  Temp: 98.9 °F (37.2 °C) (05/16/24 0753)  Pulse: 62 (05/16/24 0753)  Resp: 18 (05/16/24 0753)  BP: (!) 180/80 (05/16/24 0823)  SpO2: 100 % (05/16/24 0753) Vital Signs (24h Range):  Temp:  [97.5 °F (36.4 °C)-98.9 °F (37.2 °C)] 98.9 °F (37.2 °C)  Pulse:  [60-68] 62  Resp:  [18] 18  SpO2:  [98 %-100 %] 100 %  BP: (102-222)/(50-97) 180/80         Physical Exam  Vitals and nursing note reviewed.   Constitutional:       General: He is not in acute distress.     Appearance: He is not ill-appearing or toxic-appearing.   HENT:      Head: Normocephalic and atraumatic.   Cardiovascular:      Rate and Rhythm: Normal rate and regular rhythm.   Pulmonary:      Effort: Pulmonary effort is normal.      Breath sounds: Normal breath sounds.      Comments: Room air  Abdominal:      General: Bowel sounds are normal. There is no distension.      Palpations: Abdomen is soft.      Tenderness: There is no abdominal tenderness.   Musculoskeletal:      Right lower leg: No edema.      Left lower leg: No edema.   Skin:     Comments: LUE AVF with thrill. Feet are currently dressed   Neurological:      Mental Status: He is alert and oriented to person, place, and time. Mental status is at baseline.   Psychiatric:         Mood and Affect: Mood normal.         Thought Content: Thought content normal.  Significant Labs:  BMP:   Recent Labs   Lab 05/15/24  0416   GLU 99      K 3.6      CO2 24   BUN 21   CREATININE 6.8*   CALCIUM 8.0*     CBC:   Recent Labs   Lab 05/16/24 0433   WBC 4.29   RBC 3.44*   HGB 8.8*   HCT 28.5*      MCV 83   MCH 25.6*   MCHC 30.9*     CMP:   Recent Labs   Lab 05/15/24  0416   GLU 99   CALCIUM 8.0*   ALBUMIN 1.8*   PROT 5.1*      K 3.6   CO2 24       BUN 21   CREATININE 6.8*   ALKPHOS 58   ALT <5*   AST 10   BILITOT 0.3       Significant Diagnostics:  I have reviewed all pertinent imaging results/findings within the past 24 hours.    Assessment/Plan:     Active Diagnoses:    Diagnosis Date Noted POA    PRINCIPAL PROBLEM:  Open wound of right foot [S91.301A] 05/10/2024 Yes    Subacute osteomyelitis of right foot [M86.271] 05/13/2024 Yes    Atherosclerosis of native artery of right lower extremity with ulceration [I70.239] 05/13/2024 Yes    Peripheral artery disease [I73.9] 04/25/2024 Yes    Paroxysmal atrial fibrillation [I48.0] 03/20/2024 Yes     Chronic    Anemia in chronic kidney disease [N18.9, D63.1] 08/26/2020 Yes     Chronic    Type 2 diabetes mellitus with diabetic neuropathy, with long-term current use of insulin [E11.40, Z79.4] 05/10/2018 Not Applicable     Chronic    Secondary hyperparathyroidism of renal origin [N25.81] 08/03/2017 Yes    ESRD (end stage renal disease) [N18.6] 03/22/2017 Yes     Chronic    Benign essential HTN [I10] 03/21/2017 Yes     Chronic    BPH (benign prostatic hyperplasia) 2/18/21 prostate bx normal [N40.0] 03/21/2017 Yes     Chronic    Dyslipidemia [E78.5] 03/21/2017 Yes     Chronic    Hemiplegia and hemiparesis following cerebral infarction affecting right dominant side [I69.351] 03/21/2017 Not Applicable     Chronic      Problems Resolved During this Admission:    Diagnosis Date Noted Date Resolved POA    Controlled type 2 diabetes mellitus with chronic kidney disease on chronic dialysis, with long-term current use of insulin [E11.22, N18.6, Z79.4, Z99.2] 03/21/2017 05/10/2024 Not Applicable     Chronic   Plan for RLE angiogram today. Please keep NPO. Prep bilateral groin.    Amelia Sofia NP  Vascular Surgery  [facility] - Observation

## 2024-05-16 NOTE — PROGRESS NOTES
Spoke with Dr. Ceron about results of angiogram and angioplasty with improvement in popliteal flow.  Plans to perform additional evaluation with ABIs and toe pressures to determine healing potential.

## 2024-05-16 NOTE — NURSING
[facility], [facility]  Nurses Note -- 4 Eyes      5/15/2024       Skin assessed on: Q Shift      [] No Pressure Injuries Present    []Prevention Measures Documented    [x] Yes LDA  for Pressure Injury Previously documented     [] Yes New Pressure Injury Discovered   [] LDA for New Pressure Injury Added      Attending RN:  Gilma Nicole RN     Second RN:  Mike Flores RN

## 2024-05-17 LAB
ALBUMIN SERPL BCP-MCNC: 1.9 G/DL (ref 3.5–5.2)
ALP SERPL-CCNC: 66 U/L (ref 55–135)
ALT SERPL W/O P-5'-P-CCNC: <5 U/L (ref 10–44)
ANION GAP SERPL CALC-SCNC: 12 MMOL/L (ref 8–16)
APTT PPP: 44.3 SEC (ref 21–32)
APTT PPP: 59.4 SEC (ref 21–32)
AST SERPL-CCNC: 9 U/L (ref 10–40)
BASOPHILS # BLD AUTO: 0.04 K/UL (ref 0–0.2)
BASOPHILS NFR BLD: 0.8 % (ref 0–1.9)
BILIRUB SERPL-MCNC: 0.3 MG/DL (ref 0.1–1)
BUN SERPL-MCNC: 22 MG/DL (ref 8–23)
CALCIUM SERPL-MCNC: 8.4 MG/DL (ref 8.7–10.5)
CHLORIDE SERPL-SCNC: 103 MMOL/L (ref 95–110)
CO2 SERPL-SCNC: 21 MMOL/L (ref 23–29)
CREAT SERPL-MCNC: 6.4 MG/DL (ref 0.5–1.4)
DIFFERENTIAL METHOD BLD: ABNORMAL
EOSINOPHIL # BLD AUTO: 0.2 K/UL (ref 0–0.5)
EOSINOPHIL NFR BLD: 4.1 % (ref 0–8)
ERYTHROCYTE [DISTWIDTH] IN BLOOD BY AUTOMATED COUNT: 18.4 % (ref 11.5–14.5)
EST. GFR  (NO RACE VARIABLE): 9 ML/MIN/1.73 M^2
F2 C.20210G>A GENO BLD/T: NEGATIVE
F5 P.R506Q BLD/T QL: NEGATIVE
GLUCOSE SERPL-MCNC: 106 MG/DL (ref 70–110)
HCT VFR BLD AUTO: 29.4 % (ref 40–54)
HGB BLD-MCNC: 9.3 G/DL (ref 14–18)
IMM GRANULOCYTES # BLD AUTO: 0.03 K/UL (ref 0–0.04)
IMM GRANULOCYTES NFR BLD AUTO: 0.6 % (ref 0–0.5)
LYMPHOCYTES # BLD AUTO: 1.1 K/UL (ref 1–4.8)
LYMPHOCYTES NFR BLD: 23 % (ref 18–48)
MCH RBC QN AUTO: 26.1 PG (ref 27–31)
MCHC RBC AUTO-ENTMCNC: 31.6 G/DL (ref 32–36)
MCV RBC AUTO: 82 FL (ref 82–98)
MONOCYTES # BLD AUTO: 0.5 K/UL (ref 0.3–1)
MONOCYTES NFR BLD: 9.8 % (ref 4–15)
NEUTROPHILS # BLD AUTO: 3 K/UL (ref 1.8–7.7)
NEUTROPHILS NFR BLD: 61.7 % (ref 38–73)
NRBC BLD-RTO: 0 /100 WBC
PHOSPHATE SERPL-MCNC: 3.6 MG/DL (ref 2.7–4.5)
PLATELET # BLD AUTO: 176 K/UL (ref 150–450)
PMV BLD AUTO: 8.9 FL (ref 9.2–12.9)
POCT GLUCOSE: 227 MG/DL (ref 70–110)
POCT GLUCOSE: 269 MG/DL (ref 70–110)
POCT GLUCOSE: 92 MG/DL (ref 70–110)
POTASSIUM SERPL-SCNC: 4.2 MMOL/L (ref 3.5–5.1)
PROT SERPL-MCNC: 5.7 G/DL (ref 6–8.4)
RBC # BLD AUTO: 3.57 M/UL (ref 4.6–6.2)
SODIUM SERPL-SCNC: 136 MMOL/L (ref 136–145)
VANCOMYCIN SERPL-MCNC: 17.5 UG/ML
WBC # BLD AUTO: 4.88 K/UL (ref 3.9–12.7)

## 2024-05-17 PROCEDURE — 63600175 PHARM REV CODE 636 W HCPCS: Mod: HCNC | Performed by: STUDENT IN AN ORGANIZED HEALTH CARE EDUCATION/TRAINING PROGRAM

## 2024-05-17 PROCEDURE — 25000003 PHARM REV CODE 250: Mod: HCNC | Performed by: STUDENT IN AN ORGANIZED HEALTH CARE EDUCATION/TRAINING PROGRAM

## 2024-05-17 PROCEDURE — 36415 COLL VENOUS BLD VENIPUNCTURE: CPT | Mod: HCNC | Performed by: STUDENT IN AN ORGANIZED HEALTH CARE EDUCATION/TRAINING PROGRAM

## 2024-05-17 PROCEDURE — 85025 COMPLETE CBC W/AUTO DIFF WBC: CPT | Mod: HCNC | Performed by: STUDENT IN AN ORGANIZED HEALTH CARE EDUCATION/TRAINING PROGRAM

## 2024-05-17 PROCEDURE — 25000003 PHARM REV CODE 250: Mod: HCNC | Performed by: HOSPITALIST

## 2024-05-17 PROCEDURE — 80053 COMPREHEN METABOLIC PANEL: CPT | Mod: HCNC | Performed by: HOSPITALIST

## 2024-05-17 PROCEDURE — 80202 ASSAY OF VANCOMYCIN: CPT | Mod: HCNC | Performed by: HOSPITALIST

## 2024-05-17 PROCEDURE — 85730 THROMBOPLASTIN TIME PARTIAL: CPT | Mod: 91,HCNC | Performed by: STUDENT IN AN ORGANIZED HEALTH CARE EDUCATION/TRAINING PROGRAM

## 2024-05-17 PROCEDURE — 85730 THROMBOPLASTIN TIME PARTIAL: CPT | Mod: HCNC | Performed by: STUDENT IN AN ORGANIZED HEALTH CARE EDUCATION/TRAINING PROGRAM

## 2024-05-17 PROCEDURE — 99233 SBSQ HOSP IP/OBS HIGH 50: CPT | Mod: HCNC,,, | Performed by: STUDENT IN AN ORGANIZED HEALTH CARE EDUCATION/TRAINING PROGRAM

## 2024-05-17 PROCEDURE — 80100016 HC MAINTENANCE HEMODIALYSIS: Mod: HCNC

## 2024-05-17 PROCEDURE — 84100 ASSAY OF PHOSPHORUS: CPT | Mod: HCNC | Performed by: STUDENT IN AN ORGANIZED HEALTH CARE EDUCATION/TRAINING PROGRAM

## 2024-05-17 PROCEDURE — 11000001 HC ACUTE MED/SURG PRIVATE ROOM: Mod: HCNC

## 2024-05-17 PROCEDURE — 63600175 PHARM REV CODE 636 W HCPCS: Mod: JZ,JG,HCNC | Performed by: INTERNAL MEDICINE

## 2024-05-17 RX ADMIN — INSULIN ASPART 3 UNITS: 100 INJECTION, SOLUTION INTRAVENOUS; SUBCUTANEOUS at 09:05

## 2024-05-17 RX ADMIN — SEVELAMER CARBONATE 800 MG: 800 TABLET, FILM COATED ORAL at 08:05

## 2024-05-17 RX ADMIN — SEVELAMER CARBONATE 800 MG: 800 TABLET, FILM COATED ORAL at 05:05

## 2024-05-17 RX ADMIN — DEXTROSE MONOHYDRATE 1 G: 2.5 INJECTION INTRAVENOUS at 08:05

## 2024-05-17 RX ADMIN — HEPARIN SODIUM 14 UNITS/KG/HR: 10000 INJECTION, SOLUTION INTRAVENOUS at 10:05

## 2024-05-17 RX ADMIN — EPOETIN ALFA-EPBX 10000 UNITS: 10000 INJECTION, SOLUTION INTRAVENOUS; SUBCUTANEOUS at 08:05

## 2024-05-17 RX ADMIN — ASPIRIN 81 MG: 81 TABLET, COATED ORAL at 08:05

## 2024-05-17 RX ADMIN — Medication 6 MG: at 09:05

## 2024-05-17 RX ADMIN — ATORVASTATIN CALCIUM 80 MG: 40 TABLET, FILM COATED ORAL at 08:05

## 2024-05-17 RX ADMIN — FINASTERIDE 5 MG: 5 TABLET, FILM COATED ORAL at 08:05

## 2024-05-17 RX ADMIN — TAMSULOSIN HYDROCHLORIDE 0.8 MG: 0.4 CAPSULE ORAL at 08:05

## 2024-05-17 RX ADMIN — VANCOMYCIN HYDROCHLORIDE 500 MG: 500 INJECTION, POWDER, LYOPHILIZED, FOR SOLUTION INTRAVENOUS at 05:05

## 2024-05-17 RX ADMIN — POLYETHYLENE GLYCOL 3350 17 G: 17 POWDER, FOR SOLUTION ORAL at 09:05

## 2024-05-17 RX ADMIN — Medication 1 CAPSULE: at 08:05

## 2024-05-17 RX ADMIN — INSULIN ASPART 4 UNITS: 100 INJECTION, SOLUTION INTRAVENOUS; SUBCUTANEOUS at 05:05

## 2024-05-17 NOTE — ASSESSMENT & PLAN NOTE
I independently reviewed patient's lab work and images as documented. 69 yo male with ESRD on HD, DM and CVA with R sided weakness with recent admission for R ankle OM (maintained on vanc/ancef x 6w, ruben 5/20) admitted for unhealing R wound. ID consulted for abx recs. Hospital course notable for blood cx with CONS, suspected contaminant, repeat blcx ngtd. He is pending angio with vascular and evaluation by ortho for potential surgical disposition. Prior to admission, he denied fevers or chills. Suspect poor wound healing due to decreased blood flow, s/p R angio on 5/16.     Recommendations:  -continue vancomycin pharm to dose and ancef, ruben 5/20  -no new culture data this admission of R foot and pt is hemodynamically stable   -follow up vascular and ortho recs

## 2024-05-17 NOTE — ASSESSMENT & PLAN NOTE
Chronic, controlled. Latest blood pressure and vitals reviewed-     Temp:  [97.3 °F (36.3 °C)-98.9 °F (37.2 °C)]   Pulse:  [62-72]   Resp:  [12-18]   BP: (111-222)/(53-97)   SpO2:  [99 %-100 %] .   Home meds for hypertension were reviewed and noted below.   Hypertension Medications               labetaloL (NORMODYNE) 100 MG tablet Take 1 tablet (100 mg total) by mouth once daily.            While in the hospital, will manage blood pressure as follows; Continue home antihypertensive regimen    Will utilize p.r.n. blood pressure medication only if patient's blood pressure greater than 180/110 and he develops symptoms such as worsening chest pain or shortness of breath.

## 2024-05-17 NOTE — PLAN OF CARE
Problem: Diabetes Comorbidity  Goal: Blood Glucose Level Within Targeted Range  Outcome: Progressing  Intervention: Monitor and Manage Glycemia  Flowsheets (Taken 5/17/2024 1618)  Glycemic Management: blood glucose monitored     Problem: Wound  Goal: Optimal Coping  Outcome: Progressing  Intervention: Support Patient and Family Response  Flowsheets (Taken 5/17/2024 1618)  Supportive Measures: relaxation techniques promoted  Family/Support System Care: self-care encouraged  Goal: Optimal Functional Ability  Outcome: Progressing  Intervention: Optimize Functional Ability  Flowsheets (Taken 5/17/2024 1618)  Activity Management: Heel slide - L1  Activity Assistance Provided: independent     Problem: Wound  Goal: Optimal Pain Control and Function  Outcome: Met  Intervention: Prevent or Manage Pain  Flowsheets (Taken 5/17/2024 1618)  Sleep/Rest Enhancement: relaxation techniques promoted  Pain Management Interventions: relaxation techniques promoted

## 2024-05-17 NOTE — PROGRESS NOTES
[facility] - Observation  Infectious Disease  Progress Note    Patient Name: Miguel Moore  MRN: [id number]  Admission Date: 5/10/2024  Length of Stay: 7 days  Attending Physician: Broderick Hernandez MD  Primary Care Provider: Raciel Raymond MD    Isolation Status: No active isolations  Assessment/Plan:      ID  Subacute osteomyelitis of right foot  I independently reviewed patient's lab work and images as documented. 67 yo male with ESRD on HD, DM and CVA with R sided weakness with recent admission for R ankle OM (maintained on vanc/ancef x 6w, ruben 5/20) admitted for unhealing R wound. ID consulted for abx recs. Hospital course notable for blood cx with CONS, suspected contaminant, repeat blcx ngtd. He is pending angio with vascular and evaluation by ortho for potential surgical disposition. Prior to admission, he denied fevers or chills. Suspect poor wound healing due to decreased blood flow, s/p R angio on 5/16.     Recommendations:  -continue vancomycin pharm to dose and ancef, ruben 5/20 as outlined in prior ID note  -no new culture data this admission of R foot and pt is hemodynamically stable   -follow up vascular and ortho recs            Thank you for your consult. I will follow-up with patient. Please contact us if you have any additional questions.       50 minutes of total time spent on the encounter, which includes face to face time and non-face to face time preparing to see the patient (eg, review of tests), obtaining and/or reviewing separately obtained history, documenting clinical information in the electronic or other health record, independently interpreting results (not separately reported) and communicating results to the patient/family/caregiver, or care coordination (not separately reported).       Laney Underwood MD  Infectious Disease  [facility] - Observation    Subjective:     Principal Problem:Open wound of right foot    HPI: 67 yo male with ESRD on HD, DM and CVA with R sided weakness with  recent admission for R ankle OM (maintained on vanc/tara x 6w, ruben 5/20) admitted for unhealing R wound. ID consulted for abx recs. Hospital course notable for blood cx with CONS, suspected contaminant, repeat blcx ngtd. He is pending angio with vascular and evaluation by ortho for potential surgical disposition. Prior to admission, he denied fevers or chills.       Interval History: No fevers documented overnight.   S/p R angio yesterday.     Review of Systems   Constitutional:  Negative for chills and fever.   All other systems reviewed and are negative.    Objective:     Vital Signs (Most Recent):  Temp: 98.3 °F (36.8 °C) (05/17/24 0756)  Pulse: 68 (05/17/24 0756)  Resp: 18 (05/17/24 0756)  BP: (!) 179/81 (05/17/24 0756)  SpO2: 100 % (05/17/24 0756) Vital Signs (24h Range):  Temp:  [97.3 °F (36.3 °C)-98.3 °F (36.8 °C)] 98.3 °F (36.8 °C)  Pulse:  [63-72] 68  Resp:  [12-18] 18  SpO2:  [99 %-100 %] 100 %  BP: (111-179)/(53-81) 179/81     Weight: 79.8 kg (175 lb 14.8 oz)  Body mass index is 26.75 kg/m².    Estimated Creatinine Clearance: 10.5 mL/min (A) (based on SCr of 6.4 mg/dL (H)).     Physical Exam  Constitutional:       Appearance: He is not ill-appearing.   Eyes:      General:         Right eye: No discharge.         Left eye: No discharge.   Pulmonary:      Effort: Pulmonary effort is normal. No respiratory distress.   Skin:     General: Skin is warm and dry.      Comments: Desmond feet wrapped   Neurological:      Mental Status: He is alert and oriented to person, place, and time.          Significant Labs:   Microbiology Results (last 7 days)       Procedure Component Value Units Date/Time    Blood culture [[id number]] Collected: 05/11/24 2006    Order Status: Completed Specimen: Blood from Peripheral, Hand, Right Updated: 05/15/24 0556     Blood Culture, Routine No Growth after 4 days.    Narrative:      could not draw second set cultures, due to patient being a hard   stick. reported to nurse Gwen Ruiz  culture [[id number]] Collected: 05/11/24 1335    Order Status: Completed Specimen: Blood Updated: 05/15/24 1703     Blood Culture, Routine No Growth after 4 days.    Aerobic culture (Specify Source) **CANNOT BE ORDERED AS STAT** [[id number]] Collected: 05/10/24 1703    Order Status: Completed Specimen: Abscess from Foot, Right Updated: 05/14/24 0723     Aerobic Bacterial Culture No growth    Blood culture #2 [[id number]]  (Abnormal) Collected: 05/10/24 1547    Order Status: Completed Specimen: Blood from Peripheral, Hand, Right Updated: 05/13/24 0948     Blood Culture, Routine Gram stain aer bottle: Gram positive cocci in clusters resembling Staph      Positive results previously called      COAGULASE-NEGATIVE STAPHYLOCOCCUS SPECIES  Organism is a probable contaminant      Narrative:      Blood Culture #2    Blood culture #1 [[id number]]  (Abnormal) Collected: 05/10/24 1545    Order Status: Completed Specimen: Blood from Peripheral, Antecubital, Right Updated: 05/13/24 0948     Blood Culture, Routine Gram stain aer bottle: Gram positive cocci in clusters resembling Staph      Results called to and read back by: Gwen Staley 05/11/2024  09:56      COAGULASE-NEGATIVE STAPHYLOCOCCUS SPECIES  Organism is a probable contaminant      Narrative:      Blood Culture #1            Significant Imaging: I have reviewed all pertinent imaging results/findings within the past 24 hours.

## 2024-05-17 NOTE — NURSING
[facility], [facility] - [place]  Nurses Note -- 4 Eyes      5/16/2024       Skin assessed on: Q Shift      [] No Pressure Injuries Present    []Prevention Measures Documented    [x] Yes LDA  for Pressure Injury Previously documented     [] Yes New Pressure Injury Discovered   [] LDA for New Pressure Injury Added      Attending RN:  Jeana Cox RN     Second RN:  Aliza Ruano

## 2024-05-17 NOTE — ASSESSMENT & PLAN NOTE
Creatine stable for now. BMP reviewed- noted Estimated Creatinine Clearance: 9.9 mL/min (A) (based on SCr of 6.8 mg/dL (H)). according to latest data. Based on current GFR, CKD stage is end stage.  Monitor UOP and serial BMP and adjust therapy as needed. Renally dose meds. Avoid nephrotoxic medications and procedures.

## 2024-05-17 NOTE — PROGRESS NOTES
Pharmacokinetic Assessment Follow Up: IV Vancomycin    Vancomycin serum concentration assessment(s):    The random level was drawn correctly and can be used to guide therapy at this time. The measurement is within the desired definitive target range of 10 to 20 mcg/mL.    Vancomycin Regimen Plan:    Re-dose Vancomycin 500 mg after HD today  Obtain random level after next HD 5/20 at 0400    Drug levels (last 3 results):  Recent Labs   Lab Result Units 05/15/24  0416 05/17/24  0322   Vancomycin, Random ug/mL 16.7 17.5       Pharmacy will continue to follow and monitor vancomycin.    Please contact pharmacy at extension [phone] for questions regarding this assessment.    Thank you for the consult,   Miguel Tong       Patient brief summary:  Miguel Moore is a 68 y.o. male initiated on antimicrobial therapy with IV Vancomycin for treatment of skin & soft tissue infection    Drug Allergies:   Review of patient's allergies indicates:   Allergen Reactions    Ace inhibitors      Hyperkalemia 8/2018  Other reaction(s): Unknown    Penicillins Hives     Tolerated cefepime and cefdinir previously    Tizanidine      Felt hot       Actual Body Weight:   79.8 kg    Renal Function:   Estimated Creatinine Clearance: 10.5 mL/min (A) (based on SCr of 6.4 mg/dL (H)).,     Dialysis Method (if applicable):  intermittent HD    CBC (last 72 hours):  Recent Labs   Lab Result Units 05/14/24  0521 05/14/24  1706 05/15/24  0416 05/16/24  0433 05/17/24  0323   WBC K/uL 4.36 4.91 4.44 4.29 4.88   Hemoglobin g/dL 7.7* 8.8* 8.6* 8.8* 9.3*   Hematocrit % 24.8* 27.6* 27.8* 28.5* 29.4*   Platelets K/uL 138* 179 208 180 176   Gran % % 60.1 64.5 61.1 55.2 61.7   Lymph % % 26.4 21.4 23.9 27.0 23.0   Mono % % 7.8 10.0 9.7 13.1 9.8   Eosinophil % % 4.1 3.3 4.1 3.5 4.1   Basophil % % 0.9 0.6 0.7 0.7 0.8   Differential Method  Automated Automated Automated Automated Automated       Metabolic Panel (last 72 hours):  Recent Labs   Lab Result Units  05/14/24  2338 05/15/24  0416 05/17/24  0323   Sodium mmol/L  --  136 136   Potassium mmol/L  --  3.6 4.2   Chloride mmol/L  --  101 103   CO2 mmol/L  --  24 21*   Glucose mg/dL  --  99 106   BUN mg/dL  --  21 22   Creatinine mg/dL  --  6.8* 6.4*   Albumin g/dL  --  1.8* 1.9*   Total Bilirubin mg/dL  --  0.3 0.3   Alkaline Phosphatase U/L  --  58 66   AST U/L  --  10 9*   ALT U/L  --  <5* <5*   Phosphorus mg/dL 4.2  --  3.6       Vancomycin Administrations:  vancomycin given in the last 96 hours                     vancomycin (VANCOCIN) 500 mg in dextrose 5 % in water (D5W) 100 mL IVPB (MB+) (mg) 500 mg New Bag 05/15/24 1641    vancomycin (VANCOCIN) 500 mg in dextrose 5 % in water (D5W) 100 mL IVPB (MB+) (mg) 500 mg New Bag 05/13/24 1716                    Microbiologic Results:  Microbiology Results (last 7 days)       Procedure Component Value Units Date/Time    Blood culture [[id number]] Collected: 05/11/24 2006    Order Status: Completed Specimen: Blood from Peripheral, Hand, Right Updated: 05/15/24 2303     Blood Culture, Routine No Growth after 4 days.    Narrative:      could not draw second set cultures, due to patient being a hard   stick. reported to nurse Gwen    Blood culture [[id number]] Collected: 05/11/24 1335    Order Status: Completed Specimen: Blood Updated: 05/15/24 1703     Blood Culture, Routine No Growth after 4 days.    Aerobic culture (Specify Source) **CANNOT BE ORDERED AS STAT** [[id number]] Collected: 05/10/24 1703    Order Status: Completed Specimen: Abscess from Foot, Right Updated: 05/14/24 0723     Aerobic Bacterial Culture No growth    Blood culture #2 [[id number]]  (Abnormal) Collected: 05/10/24 1547    Order Status: Completed Specimen: Blood from Peripheral, Hand, Right Updated: 05/13/24 0948     Blood Culture, Routine Gram stain aer bottle: Gram positive cocci in clusters resembling Staph      Positive results previously called      COAGULASE-NEGATIVE STAPHYLOCOCCUS  SPECIES  Organism is a probable contaminant      Narrative:      Blood Culture #2    Blood culture #1 [[id number]]  (Abnormal) Collected: 05/10/24 1545    Order Status: Completed Specimen: Blood from Peripheral, Antecubital, Right Updated: 05/13/24 0948     Blood Culture, Routine Gram stain aer bottle: Gram positive cocci in clusters resembling Staph      Results called to and read back by: Gwen Staley 05/11/2024  09:56      COAGULASE-NEGATIVE STAPHYLOCOCCUS SPECIES  Organism is a probable contaminant      Narrative:      Blood Culture #1

## 2024-05-17 NOTE — ASSESSMENT & PLAN NOTE
3/20/24 RLE arterial US Arterial vasculature of the right lower extremity is patent.  However, there is evidence of atherosclerotic change with stenosis at the level of popliteal artery.   - continue asa  - ok to resume statin now that LFTs are normal  - Vascular consulted:  s/p procedure on 5/16

## 2024-05-17 NOTE — NURSING
Pt is leaving the unit via bed for dialysis, no distress noted. Will wait for the pt to return to the unit.

## 2024-05-17 NOTE — PROGRESS NOTES
[facility] Medicine  Progress Note    Patient Name: Miguel Moore  MRN: [id number]  Patient Class: IP- Inpatient   Admission Date: 5/10/2024  Length of Stay: 7 days  Attending Physician: Broderick Hernandez MD  Primary Care Provider: Raciel Raymond MD        Subjective:     Principal Problem:Open wound of right foot        HPI:  68 y.o. male with hypertension, BPH, chronic right lower extremity DVT, DM2, dyslipidemia, ESRD on HD, hemiplegia and hemiparesis following cerebral infarction affecting the right dominant side, paroxysmal AFib, seizure disorder as sequela of CVA sent to the ED from wound care for new wound infection.  Denies fever, chills, cough, shortness breath, chest pain, dizziness, syncope.  Was hospitalized in March for cellulitis and abscess to the right foot with surgical intervention at that time.  In the ED today, CRP and procalcitonin elevated.  Blood and wound cultures were obtained.  IV antibiotics initiated.    Overview/Hospital Course:  69yo M ESRD, PAD, R foot wounds s/p multiple surgeries admitted w worsening wound. Did have Staph epi in blood culture, likely contaminant. He does not want further debridement, wants BKA if needed. Started vancomycin and meropenem given his history of ESBL organisms. Ortho [facility] consulted. Vascular consulted- plan for RLE angiogram on 05/16 . Nephro following for HD. He also has L heel dark area, XR left heel with Osseous structures demonstrate no evidence for acute fracture or osseous destructive lesion.  Mild diffuse demineralization. wound care consulted.       Interval History: vancomycin pharm to dose and ancef, ruben 5/20 . Will need wound care    Objective:     Vital Signs (Most Recent):  Temp: 97.3 °F (36.3 °C) (05/16/24 2358)  Pulse: 63 (05/16/24 2358)  Resp: 18 (05/16/24 2358)  BP: 133/64 (05/16/24 2358)  SpO2: 100 % (05/16/24 2358) Vital Signs (24h Range):  Temp:  [97.3 °F (36.3 °C)-98.9 °F (37.2 °C)] 97.3 °F (36.3  °C)  Pulse:  [62-72] 63  Resp:  [12-18] 18  SpO2:  [99 %-100 %] 100 %  BP: (111-222)/(53-97) 133/64     Weight: 77 kg (169 lb 12.1 oz)  Body mass index is 25.81 kg/m².    Intake/Output Summary (Last 24 hours) at 5/17/2024 0202  Last data filed at 5/16/2024 2036  Gross per 24 hour   Intake 240 ml   Output --   Net 240 ml         Physical Exam  Vitals and nursing note reviewed.   Constitutional:       General: He is not in acute distress.     Appearance: Normal appearance. He is not ill-appearing.   HENT:      Head: Normocephalic and atraumatic.   Eyes:      General: No scleral icterus.     Pupils: Pupils are equal, round, and reactive to light.   Cardiovascular:      Rate and Rhythm: Normal rate and regular rhythm.      Pulses: Normal pulses.      Heart sounds: Normal heart sounds.   Pulmonary:      Effort: Pulmonary effort is normal.      Breath sounds: Normal breath sounds.   Abdominal:      General: Abdomen is flat. There is no distension.      Palpations: Abdomen is soft.      Tenderness: There is no abdominal tenderness.   Musculoskeletal:         General: Deformity and signs of injury present.      Right lower leg: No edema.      Left lower leg: No edema.   Lymphadenopathy:      Cervical: No cervical adenopathy.   Skin:     General: Skin is warm and dry.      Capillary Refill: Capillary refill takes more than 3 seconds.      Coloration: Skin is pale.      Findings: Lesion present.   Neurological:      General: No focal deficit present.      Mental Status: He is alert and oriented to person, place, and time.             Significant Labs: All pertinent labs within the past 24 hours have been reviewed.  CBC:   Recent Labs   Lab 05/15/24  0416 05/16/24  0433   WBC 4.44 4.29   HGB 8.6* 8.8*   HCT 27.8* 28.5*    180     CMP:   Recent Labs   Lab 05/15/24  0416      K 3.6      CO2 24   GLU 99   BUN 21   CREATININE 6.8*   CALCIUM 8.0*   PROT 5.1*   ALBUMIN 1.8*   BILITOT 0.3   ALKPHOS 58   AST 10    ALT <5*   ANIONGAP 11       Significant Imaging: I have reviewed all pertinent imaging results/findings within the past 24 hours.    Assessment/Plan:      * Open wound of right foot  Worsening R foot wounds. Home health nursing noted purulence. No pain.   - blood cultures with Staph epi, coag negative- likely contaminant  - repeat blood cx with NGTD  - continue vanc, given history of ESBL organisms. Meropenem dc on 05/15  - he does not want further surgeries on his foot and is preparing himself mentally for amputation.   - Orthopaedics ([facility]) consulted with further recs pending ortho/vascular surgery multidisciplinary discussion of healing potential.  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  However, there is evidence of atherosclerotic change with stenosis at the level of popliteal artery.   - continue asa  - ok to resume statin now that LFTs are normal  - Vascular consulted:  s/p procedure on 5/16      Paroxysmal atrial fibrillation  Patient with Paroxysmal (<7 days) atrial fibrillation which is controlled currently with Beta Blocker. Patient is currently in sinus rhythm.[id number] Score: 3. Anticoagulation indicated. Anticoagulation done with apixaban .    -hold apixaban for plan angiogram on 05/16  - discuss transition from heparin to DOAC with vascular on timing.    Anemia in chronic kidney disease  Patient's anemia is currently controlled. Has not received any PRBCs to date. Etiology likely d/t chronic disease due to ESRD  Current CBC reviewed-   Lab Results   Component Value Date    HGB 8.8 (L)  05/16/2024    HCT 28.5 (L) 05/16/2024     Monitor serial CBC and transfuse if patient becomes hemodynamically unstable, symptomatic or H/H drops below 7/21.    Type 2 diabetes mellitus with diabetic neuropathy, with long-term current use of insulin  Patient's FSGs are controlled on current medication regimen.  Last A1c reviewed-   Lab Results   Component Value Date    HGBA1C 5.8 (H) 03/20/2024     Most recent fingerstick glucose reviewed-   Recent Labs   Lab 05/16/24  0752 05/16/24  1942 05/16/24 2027   POCTGLUCOSE 107 158* 161*       Current correctional scale  Medium  Maintain anti-hyperglycemic dose as follows-   Antihyperglycemics (From admission, onward)      Start     Stop Route Frequency Ordered    05/10/24 1858  insulin aspart U-100 pen 0-10 Units         -- SubQ Before meals & nightly PRN 05/10/24 1758            Secondary hyperparathyroidism of renal origin  Continue sevelamer      ESRD (end stage renal disease)  Receives HD MWF via LUE AVF. Has not missed any sessions.   - Nephrology consulted  - appears euvolemic, dialysis per Nephrology    Hemiplegia and hemiparesis following cerebral infarction affecting right dominant side  At baseline  Continue eliquis/asa, BP Rx  LFTs normalized- can resume statin now     BPH (benign prostatic hyperplasia) 2/18/21 prostate bx normal  Continue home regimen of tamsulosin and finasteride    Dyslipidemia  Not currently on statin due to previously elevated LFTs.   LFTs now normal- resume statin     Benign essential HTN  Chronic, controlled. Latest blood pressure and vitals reviewed-     Temp:  [97.3 °F (36.3 °C)-98.9 °F (37.2 °C)]   Pulse:  [62-72]   Resp:  [12-18]   BP: (111-222)/(53-97)   SpO2:  [99 %-100 %] .   Home meds for hypertension were reviewed and noted below.   Hypertension Medications               labetaloL (NORMODYNE) 100 MG tablet Take 1 tablet (100 mg total) by mouth once daily.            While in the hospital, will manage blood pressure as follows;  Continue home antihypertensive regimen    Will utilize p.r.n. blood pressure medication only if patient's blood pressure greater than 180/110 and he develops symptoms such as worsening chest pain or shortness of breath.      VTE Risk Mitigation (From admission, onward)           Ordered     heparin 25,000 units in dextrose 5% (100 units/ml) IV bolus from bag HIGH INTENSITY nomogram - [facility]  As needed (PRN)        Question:  Heparin Infusion Adjustment (DO NOT MODIFY ANSWER)  Answer:  [email or web address]\epic\Images\Pharmacy\HeparinInfusions\heparin HIGH INTENSITY nomogram for [facility] [id number]    05/14/24 1642     heparin 25,000 units in dextrose 5% (100 units/ml) IV bolus from bag HIGH INTENSITY nomogram - [facility]  As needed (PRN)        Question:  Heparin Infusion Adjustment (DO NOT MODIFY ANSWER)  Answer:  [email or web address]\epic\Images\Pharmacy\HeparinInfusions\heparin HIGH INTENSITY nomogram for [facility] [id number]    05/14/24 1642     heparin 25,000 units in dextrose 5% 250 mL (100 units/mL) infusion HIGH INTENSITY nomogram - [facility]  Continuous        Question:  Begin at (units/kg/hr)  Answer:  18    05/14/24 1642     heparin 25,000 units in dextrose 5% 250 mL (100 units/mL) infusion HIGH INTENSITY nomogram - [facility]  Continuous        Question:  Begin at (units/kg/hr)  Answer:  18 05/13/24 1416     IP VTE HIGH RISK PATIENT  Once         05/10/24 1758     Place sequential compression device  Until discontinued         05/10/24 1758     Reason for No Pharmacological VTE Prophylaxis  Once        Question:  Reasons:  Answer:  Already adequately anticoagulated on oral Anticoagulants    05/10/24 1758                    Discharge Planning   GARY:      Code Status: Full Code   Is the patient medically ready for discharge?:     Reason for patient still in hospital (select all that apply): Consult recommendations  Discharge Plan A: Home with family   Discharge Delays: None known at this time              Broderick Hernandez MD  Department of Hospital  Medicine   [facility] - Observation

## 2024-05-17 NOTE — SUBJECTIVE & OBJECTIVE
Interval History: vancomycin pharm to dose and ancef, ruben 5/20 . Will need wound care    Objective:     Vital Signs (Most Recent):  Temp: 97.3 °F (36.3 °C) (05/16/24 2358)  Pulse: 63 (05/16/24 2358)  Resp: 18 (05/16/24 2358)  BP: 133/64 (05/16/24 2358)  SpO2: 100 % (05/16/24 2358) Vital Signs (24h Range):  Temp:  [97.3 °F (36.3 °C)-98.9 °F (37.2 °C)] 97.3 °F (36.3 °C)  Pulse:  [62-72] 63  Resp:  [12-18] 18  SpO2:  [99 %-100 %] 100 %  BP: (111-222)/(53-97) 133/64     Weight: 77 kg (169 lb 12.1 oz)  Body mass index is 25.81 kg/m².    Intake/Output Summary (Last 24 hours) at 5/17/2024 0202  Last data filed at 5/16/2024 2036  Gross per 24 hour   Intake 240 ml   Output --   Net 240 ml         Physical Exam  Vitals and nursing note reviewed.   Constitutional:       General: He is not in acute distress.     Appearance: Normal appearance. He is not ill-appearing.   HENT:      Head: Normocephalic and atraumatic.   Eyes:      General: No scleral icterus.     Pupils: Pupils are equal, round, and reactive to light.   Cardiovascular:      Rate and Rhythm: Normal rate and regular rhythm.      Pulses: Normal pulses.      Heart sounds: Normal heart sounds.   Pulmonary:      Effort: Pulmonary effort is normal.      Breath sounds: Normal breath sounds.   Abdominal:      General: Abdomen is flat. There is no distension.      Palpations: Abdomen is soft.      Tenderness: There is no abdominal tenderness.   Musculoskeletal:         General: Deformity and signs of injury present.      Right lower leg: No edema.      Left lower leg: No edema.   Lymphadenopathy:      Cervical: No cervical adenopathy.   Skin:     General: Skin is warm and dry.      Capillary Refill: Capillary refill takes more than 3 seconds.      Coloration: Skin is pale.      Findings: Lesion present.   Neurological:      General: No focal deficit present.      Mental Status: He is alert and oriented to person, place, and time.             Significant Labs: All pertinent  labs within the past 24 hours have been reviewed.  CBC:   Recent Labs   Lab 05/15/24  0416 05/16/24  0433   WBC 4.44 4.29   HGB 8.6* 8.8*   HCT 27.8* 28.5*    180     CMP:   Recent Labs   Lab 05/15/24  0416      K 3.6      CO2 24   GLU 99   BUN 21   CREATININE 6.8*   CALCIUM 8.0*   PROT 5.1*   ALBUMIN 1.8*   BILITOT 0.3   ALKPHOS 58   AST 10   ALT <5*   ANIONGAP 11       Significant Imaging: I have reviewed all pertinent imaging results/findings within the past 24 hours.

## 2024-05-17 NOTE — ASSESSMENT & PLAN NOTE
Patient's FSGs are controlled on current medication regimen.  Last A1c reviewed-   Lab Results   Component Value Date    HGBA1C 5.8 (H) 03/20/2024     Most recent fingerstick glucose reviewed-   Recent Labs   Lab 05/16/24  0752 05/16/24  1942 05/16/24 2027   POCTGLUCOSE 107 158* 161*       Current correctional scale  Medium  Maintain anti-hyperglycemic dose as follows-   Antihyperglycemics (From admission, onward)    Start     Stop Route Frequency Ordered    05/10/24 1858  insulin aspart U-100 pen 0-10 Units         -- SubQ Before meals & nightly PRN 05/10/24 2849

## 2024-05-17 NOTE — PROGRESS NOTES
Renal Progress Note    Date of Admission:  5/10/2024  2:04 PM    Length of Stay: 7  Days    Subjective: no complaints    Objective:    Current Facility-Administered Medications   Medication    acetaminophen tablet 650 mg    albuterol-ipratropium 2.5 mg-0.5 mg/3 mL nebulizer solution 3 mL    aluminum-magnesium hydroxide-simethicone 200-200-20 mg/5 mL suspension 30 mL    aspirin EC tablet 81 mg    atorvastatin tablet 80 mg    bisacodyL suppository 10 mg    ceFAZolin (ANCEF) 1 g in dextrose 5 % in water (D5W) 50 mL IVPB (MB+)    dextrose 10% bolus 125 mL 125 mL    dextrose 10% bolus 250 mL 250 mL    epoetin marisol-epbx injection 10,000 Units    finasteride tablet 5 mg    glucagon (human recombinant) injection 1 mg    glucose chewable tablet 16 g    glucose chewable tablet 24 g    heparin 25,000 units in dextrose 5% (100 units/ml) IV bolus from bag HIGH INTENSITY nomogram - [facility]    heparin 25,000 units in dextrose 5% (100 units/ml) IV bolus from bag HIGH INTENSITY nomogram - [facility]    heparin 25,000 units in dextrose 5% 250 mL (100 units/mL) infusion HIGH INTENSITY nomogram - [facility]    insulin aspart U-100 pen 0-10 Units    labetaloL tablet 100 mg    melatonin tablet 6 mg    naloxone 0.4 mg/mL injection 0.02 mg    ondansetron injection 4 mg    oxyCODONE-acetaminophen 5-325 mg per tablet 1 tablet    polyethylene glycol packet 17 g    prochlorperazine injection Soln 5 mg    sevelamer carbonate tablet 800 mg    simethicone chewable tablet 80 mg    sodium chloride 0.9% bolus 250 mL 250 mL    sodium chloride 0.9% flush 10 mL    tamsulosin 24 hr capsule 0.8 mg    vancomycin (VANCOCIN) 500 mg in dextrose 5 % in water (D5W) 100 mL IVPB (MB+)    vancomycin - pharmacy to dose    vitamin renal formula (B-complex-vitamin c-folic acid) 1 mg per capsule 1 capsule       Vitals:    05/17/24 1100 05/17/24 1130 05/17/24 1200 05/17/24 1230   BP: (!) 168/89 (!) 166/82 (!) 161/82 (!) 150/82   BP Location:       Patient  "Position:       Pulse: 60 60 64 64   Resp:       Temp:       TempSrc:       SpO2:       Weight:       Height:           I/O last 3 completed shifts:  In: 360 [P.O.:360]  Out: -   I/O this shift:  In: 120 [P.O.:120]  Out: -       Physical Exam:     General: NAD, seen during Dialysis  Neck: supple  Heart: RRR  Lungs: unlabored breathing   Abdomen: n/a  Limbs: n/a  Neurologic: awake      Laboratories:    Recent Labs   Lab 05/17/24  0323   WBC 4.88   RBC 3.57*   HGB 9.3*   HCT 29.4*      MCV 82   MCH 26.1*   MCHC 31.6*       Recent Labs   Lab 05/17/24 0323   CALCIUM 8.4*   ALBUMIN 1.9*   PROT 5.7*      K 4.2   CO2 21*      BUN 22   CREATININE 6.4*   ALKPHOS 66   ALT <5*   AST 9*   BILITOT 0.3       No results for input(s): "COLORU", "CLARITYU", "SPECGRAV", "PHUR", "PROTEINUA", "GLUCOSEU", "BILIRUBINCON", "BLOODU", "WBCU", "RBCU", "BACTERIA", "MUCUS" in the last 24 hours.    Microbiology Results (last 7 days)       Procedure Component Value Units Date/Time    Blood culture [[id number]] Collected: 05/11/24 2006    Order Status: Completed Specimen: Blood from Peripheral, Hand, Right Updated: 05/15/24 2303     Blood Culture, Routine No Growth after 4 days.    Narrative:      could not draw second set cultures, due to patient being a hard   stick. reported to nurse Gwen    Blood culture [[id number]] Collected: 05/11/24 1335    Order Status: Completed Specimen: Blood Updated: 05/15/24 1703     Blood Culture, Routine No Growth after 4 days.    Aerobic culture (Specify Source) **CANNOT BE ORDERED AS STAT** [[id number]] Collected: 05/10/24 1703    Order Status: Completed Specimen: Abscess from Foot, Right Updated: 05/14/24 0723     Aerobic Bacterial Culture No growth    Blood culture #2 [[id number]]  (Abnormal) Collected: 05/10/24 1547    Order Status: Completed Specimen: Blood from Peripheral, Hand, Right Updated: 05/13/24 0948     Blood Culture, Routine Gram stain aer bottle: Gram positive cocci in " clusters resembling Staph      Positive results previously called      COAGULASE-NEGATIVE STAPHYLOCOCCUS SPECIES  Organism is a probable contaminant      Narrative:      Blood Culture #2    Blood culture #1 [[id number]]  (Abnormal) Collected: 05/10/24 1545    Order Status: Completed Specimen: Blood from Peripheral, Antecubital, Right Updated: 05/13/24 0948     Blood Culture, Routine Gram stain aer bottle: Gram positive cocci in clusters resembling Staph      Results called to and read back by: Gwen Staley 05/11/2024  09:56      COAGULASE-NEGATIVE STAPHYLOCOCCUS SPECIES  Organism is a probable contaminant      Narrative:      Blood Culture #1              Diagnostic Tests: n/a        Assessment:    69 y/o male with ESRD on HD admitted with:    - R-foot abscess with open wound and skin necrosis s/p debridements  - PAD: Atherosclerosis of native artery of right lower extremity with ulceration   - Paroxysmal atrial fibrillation with RVR   - Hyperkalemia RESOLVED  - DM-2 w/renal manifestations  - HTN  - Anemia of ckd  - 2nd. Hyperparathyroidism  - Hypoalbuminemia  - Hx. Of prior CVA with R-hemiplegia  - Seizure disorder due to above  - HLD        Plan:     - Ortho following  - Vascular following s/p R-popliteal angioplasty  - Antibiotics (Vanco)  - Dialysis Q MWF  - UF as tolerated  - Epogen as needed  - Renal ADA diet + protein supplements  - Oral PO4- binders  - Apixaban for PAF as per Cardiology  - Glycemic control and other problems per admitting        Will follow on Dialysis days.

## 2024-05-17 NOTE — ASSESSMENT & PLAN NOTE
Worsening R foot wounds. Home health nursing noted purulence. No pain.   - blood cultures with Staph epi, coag negative- likely contaminant  - repeat blood cx with NGTD  - continue vanc, given history of ESBL organisms. Meropenem dc on 05/15  - he does not want further surgeries on his foot and is preparing himself mentally for amputation.   - Orthopaedics ([facility]) consulted with further recs pending ortho/vascular surgery multidisciplinary discussion of healing potential.  - ID consulted - vancomycin pharm to dose and ancef, ruben 5/20

## 2024-05-17 NOTE — ASSESSMENT & PLAN NOTE
Patient with Paroxysmal (<7 days) atrial fibrillation which is controlled currently with Beta Blocker. Patient is currently in sinus rhythm.[id number] Score: 3. Anticoagulation indicated. Anticoagulation done with apixaban .    -hold apixaban for plan angiogram on 05/16  - discuss transition from heparin to DOAC with vascular on timing.

## 2024-05-17 NOTE — NURSING
[facility] [place]  Nurses Note -- 4 Eyes      5/17/2024       Skin assessed on: Q Shift      [] No Pressure Injuries Present    []Prevention Measures Documented    [x] Yes LDA  for Pressure Injury Previously documented     [] Yes New Pressure Injury Discovered   [] LDA for New Pressure Injury Added      Attending RN:  Love Poole LPN     Second RN:  DARRION Hills

## 2024-05-17 NOTE — PROGRESS NOTES
Got ok from Dr. Abraham to restart Heparin after getting updated aPTT. Notfied lab of STAT order placed for aPTT.

## 2024-05-17 NOTE — SUBJECTIVE & OBJECTIVE
Interval History: No fevers documented overnight.   S/p R angio yesterday.     Review of Systems   Constitutional:  Negative for chills and fever.   All other systems reviewed and are negative.    Objective:     Vital Signs (Most Recent):  Temp: 98.3 °F (36.8 °C) (05/17/24 0756)  Pulse: 68 (05/17/24 0756)  Resp: 18 (05/17/24 0756)  BP: (!) 179/81 (05/17/24 0756)  SpO2: 100 % (05/17/24 0756) Vital Signs (24h Range):  Temp:  [97.3 °F (36.3 °C)-98.3 °F (36.8 °C)] 98.3 °F (36.8 °C)  Pulse:  [63-72] 68  Resp:  [12-18] 18  SpO2:  [99 %-100 %] 100 %  BP: (111-179)/(53-81) 179/81     Weight: 79.8 kg (175 lb 14.8 oz)  Body mass index is 26.75 kg/m².    Estimated Creatinine Clearance: 10.5 mL/min (A) (based on SCr of 6.4 mg/dL (H)).     Physical Exam  Constitutional:       Appearance: He is not ill-appearing.   Eyes:      General:         Right eye: No discharge.         Left eye: No discharge.   Pulmonary:      Effort: Pulmonary effort is normal. No respiratory distress.   Skin:     General: Skin is warm and dry.      Comments: Desmond feet wrapped   Neurological:      Mental Status: He is alert and oriented to person, place, and time.          Significant Labs:   Microbiology Results (last 7 days)       Procedure Component Value Units Date/Time    Blood culture [[id number]] Collected: 05/11/24 2006    Order Status: Completed Specimen: Blood from Peripheral, Hand, Right Updated: 05/15/24 2303     Blood Culture, Routine No Growth after 4 days.    Narrative:      could not draw second set cultures, due to patient being a hard   stick. reported to nurse Hidalgo    Blood culture [[id number]] Collected: 05/11/24 1335    Order Status: Completed Specimen: Blood Updated: 05/15/24 1703     Blood Culture, Routine No Growth after 4 days.    Aerobic culture (Specify Source) **CANNOT BE ORDERED AS STAT** [[id number]] Collected: 05/10/24 1703    Order Status: Completed Specimen: Abscess from Foot, Right Updated: 05/14/24 0723     Aerobic  Bacterial Culture No growth    Blood culture #2 [[id number]]  (Abnormal) Collected: 05/10/24 1547    Order Status: Completed Specimen: Blood from Peripheral, Hand, Right Updated: 05/13/24 0948     Blood Culture, Routine Gram stain aer bottle: Gram positive cocci in clusters resembling Staph      Positive results previously called      COAGULASE-NEGATIVE STAPHYLOCOCCUS SPECIES  Organism is a probable contaminant      Narrative:      Blood Culture #2    Blood culture #1 [[id number]]  (Abnormal) Collected: 05/10/24 1545    Order Status: Completed Specimen: Blood from Peripheral, Antecubital, Right Updated: 05/13/24 0948     Blood Culture, Routine Gram stain aer bottle: Gram positive cocci in clusters resembling Staph      Results called to and read back by: Gwen Staley 05/11/2024  09:56      COAGULASE-NEGATIVE STAPHYLOCOCCUS SPECIES  Organism is a probable contaminant      Narrative:      Blood Culture #1            Significant Imaging: I have reviewed all pertinent imaging results/findings within the past 24 hours.

## 2024-05-18 LAB
APTT PPP: 41.9 SEC (ref 21–32)
BASOPHILS # BLD AUTO: 0.03 K/UL (ref 0–0.2)
BASOPHILS NFR BLD: 0.7 % (ref 0–1.9)
DIFFERENTIAL METHOD BLD: ABNORMAL
EOSINOPHIL # BLD AUTO: 0.2 K/UL (ref 0–0.5)
EOSINOPHIL NFR BLD: 3.5 % (ref 0–8)
ERYTHROCYTE [DISTWIDTH] IN BLOOD BY AUTOMATED COUNT: 18.6 % (ref 11.5–14.5)
HCT VFR BLD AUTO: 29.5 % (ref 40–54)
HGB BLD-MCNC: 9.4 G/DL (ref 14–18)
IMM GRANULOCYTES # BLD AUTO: 0.01 K/UL (ref 0–0.04)
IMM GRANULOCYTES NFR BLD AUTO: 0.2 % (ref 0–0.5)
LYMPHOCYTES # BLD AUTO: 1.1 K/UL (ref 1–4.8)
LYMPHOCYTES NFR BLD: 25.5 % (ref 18–48)
MCH RBC QN AUTO: 26.3 PG (ref 27–31)
MCHC RBC AUTO-ENTMCNC: 31.9 G/DL (ref 32–36)
MCV RBC AUTO: 82 FL (ref 82–98)
MONOCYTES # BLD AUTO: 0.5 K/UL (ref 0.3–1)
MONOCYTES NFR BLD: 11 % (ref 4–15)
NEUTROPHILS # BLD AUTO: 2.5 K/UL (ref 1.8–7.7)
NEUTROPHILS NFR BLD: 59.1 % (ref 38–73)
NRBC BLD-RTO: 0 /100 WBC
PLATELET # BLD AUTO: 173 K/UL (ref 150–450)
PMV BLD AUTO: 9.7 FL (ref 9.2–12.9)
POCT GLUCOSE: 118 MG/DL (ref 70–110)
POCT GLUCOSE: 122 MG/DL (ref 70–110)
POCT GLUCOSE: 153 MG/DL (ref 70–110)
POCT GLUCOSE: 312 MG/DL (ref 70–110)
POCT GLUCOSE: 99 MG/DL (ref 70–110)
RBC # BLD AUTO: 3.58 M/UL (ref 4.6–6.2)
WBC # BLD AUTO: 4.28 K/UL (ref 3.9–12.7)

## 2024-05-18 PROCEDURE — 85730 THROMBOPLASTIN TIME PARTIAL: CPT | Mod: HCNC | Performed by: STUDENT IN AN ORGANIZED HEALTH CARE EDUCATION/TRAINING PROGRAM

## 2024-05-18 PROCEDURE — 63600175 PHARM REV CODE 636 W HCPCS: Mod: HCNC | Performed by: STUDENT IN AN ORGANIZED HEALTH CARE EDUCATION/TRAINING PROGRAM

## 2024-05-18 PROCEDURE — 99233 SBSQ HOSP IP/OBS HIGH 50: CPT | Mod: HCNC,,, | Performed by: STUDENT IN AN ORGANIZED HEALTH CARE EDUCATION/TRAINING PROGRAM

## 2024-05-18 PROCEDURE — 25000003 PHARM REV CODE 250: Mod: HCNC | Performed by: STUDENT IN AN ORGANIZED HEALTH CARE EDUCATION/TRAINING PROGRAM

## 2024-05-18 PROCEDURE — 25000003 PHARM REV CODE 250: Mod: HCNC | Performed by: HOSPITALIST

## 2024-05-18 PROCEDURE — 85025 COMPLETE CBC W/AUTO DIFF WBC: CPT | Mod: HCNC | Performed by: STUDENT IN AN ORGANIZED HEALTH CARE EDUCATION/TRAINING PROGRAM

## 2024-05-18 PROCEDURE — 94760 N-INVAS EAR/PLS OXIMETRY 1: CPT | Mod: HCNC

## 2024-05-18 PROCEDURE — 36415 COLL VENOUS BLD VENIPUNCTURE: CPT | Mod: HCNC | Performed by: STUDENT IN AN ORGANIZED HEALTH CARE EDUCATION/TRAINING PROGRAM

## 2024-05-18 PROCEDURE — 11000001 HC ACUTE MED/SURG PRIVATE ROOM: Mod: HCNC

## 2024-05-18 RX ORDER — AMLODIPINE BESYLATE 2.5 MG/1
2.5 TABLET ORAL DAILY
Status: DISCONTINUED | OUTPATIENT
Start: 2024-05-18 | End: 2024-05-24 | Stop reason: HOSPADM

## 2024-05-18 RX ADMIN — SEVELAMER CARBONATE 800 MG: 800 TABLET, FILM COATED ORAL at 12:05

## 2024-05-18 RX ADMIN — SEVELAMER CARBONATE 800 MG: 800 TABLET, FILM COATED ORAL at 05:05

## 2024-05-18 RX ADMIN — ATORVASTATIN CALCIUM 80 MG: 40 TABLET, FILM COATED ORAL at 08:05

## 2024-05-18 RX ADMIN — ASPIRIN 81 MG: 81 TABLET, COATED ORAL at 08:05

## 2024-05-18 RX ADMIN — FINASTERIDE 5 MG: 5 TABLET, FILM COATED ORAL at 08:05

## 2024-05-18 RX ADMIN — SEVELAMER CARBONATE 800 MG: 800 TABLET, FILM COATED ORAL at 08:05

## 2024-05-18 RX ADMIN — DEXTROSE MONOHYDRATE 1 G: 2.5 INJECTION INTRAVENOUS at 08:05

## 2024-05-18 RX ADMIN — INSULIN ASPART 2 UNITS: 100 INJECTION, SOLUTION INTRAVENOUS; SUBCUTANEOUS at 12:05

## 2024-05-18 RX ADMIN — LABETALOL HYDROCHLORIDE 100 MG: 100 TABLET, FILM COATED ORAL at 08:05

## 2024-05-18 RX ADMIN — POLYETHYLENE GLYCOL 3350 17 G: 17 POWDER, FOR SOLUTION ORAL at 08:05

## 2024-05-18 RX ADMIN — AMLODIPINE BESYLATE 2.5 MG: 2.5 TABLET ORAL at 02:05

## 2024-05-18 RX ADMIN — TAMSULOSIN HYDROCHLORIDE 0.8 MG: 0.4 CAPSULE ORAL at 08:05

## 2024-05-18 RX ADMIN — Medication 1 CAPSULE: at 08:05

## 2024-05-18 NOTE — NURSING
Nurses Note -- 4 Eyes      5/17/2024   7:52 PM      Skin assessed during: Q Shift Change      [] No Altered Skin Integrity Present    []Prevention Measures Documented      [x] Yes LDA for Pressure Injuries Previously documented    [] LDA Added if Not in Epic (Describe Wound)   [] New Altered Skin Integrity was Present on Admit and Documented in LDA   [] Wound Image Taken    Mepelix to sacrum    Attending Nurse:  Gabby Kaminski RN/Staff Member:  Love

## 2024-05-18 NOTE — ASSESSMENT & PLAN NOTE
CHADSVASC2 = 3  Eliquis has been on hold d/t peripheral intervention done 5/16, pt on heparin gtt

## 2024-05-18 NOTE — PLAN OF CARE
Problem: Adult Inpatient Plan of Care  Goal: Plan of Care Review  Flowsheets (Taken 5/18/2024 0405)  Plan of Care Reviewed With: patient  Goal: Absence of Hospital-Acquired Illness or Injury  Intervention: Identify and Manage Fall Risk  Flowsheets (Taken 5/18/2024 0405)  Safety Promotion/Fall Prevention:   assistive device/personal item within reach   diversional activities provided   Fall Risk reviewed with patient/family   medications reviewed   room near unit station   side rails raised x 3   instructed to call staff for mobility  Intervention: Prevent Skin Injury  Flowsheets (Taken 5/18/2024 0405)  Body Position: turned  Skin Protection: incontinence pads utilized  Device Skin Pressure Protection: absorbent pad utilized/changed  Intervention: Prevent Infection  Flowsheets (Taken 5/18/2024 0405)  Infection Prevention:   environmental surveillance performed   hand hygiene promoted   single patient room provided  Goal: Optimal Comfort and Wellbeing  Intervention: Monitor Pain and Promote Comfort  Flowsheets (Taken 5/18/2024 0405)  Pain Management Interventions: care clustered  Intervention: Provide Person-Centered Care  Flowsheets (Taken 5/18/2024 0405)  Trust Relationship/Rapport:   care explained   choices provided     Problem: Infection  Goal: Absence of Infection Signs and Symptoms  Intervention: Prevent or Manage Infection  Flowsheets (Taken 5/18/2024 0405)  Infection Management: aseptic technique maintained     Problem: Diabetes Comorbidity  Goal: Blood Glucose Level Within Targeted Range  Intervention: Monitor and Manage Glycemia  Flowsheets (Taken 5/18/2024 0405)  Glycemic Management: blood glucose monitored     Problem: Wound  Goal: Optimal Coping  Intervention: Support Patient and Family Response  Flowsheets (Taken 5/18/2024 0405)  Supportive Measures: active listening utilized  Family/Support System Care: self-care encouraged  Goal: Optimal Functional Ability  Intervention: Optimize Functional  Ability  Flowsheets (Taken 5/18/2024 0405)  Activity Management: Rolling - L1  Goal: Absence of Infection Signs and Symptoms  Intervention: Prevent or Manage Infection  Flowsheets (Taken 5/18/2024 0405)  Infection Management: aseptic technique maintained  Goal: Skin Health and Integrity  Intervention: Optimize Skin Protection  Flowsheets (Taken 5/18/2024 0405)  Pressure Reduction Techniques:   frequent weight shift encouraged   weight shift assistance provided  Skin Protection: incontinence pads utilized  Activity Management: Rolling - L1  Head of Bed (HOB) Positioning: HOB elevated  Goal: Optimal Wound Healing  Intervention: Promote Wound Healing  Flowsheets (Taken 5/18/2024 0405)  Sleep/Rest Enhancement: awakenings minimized     Problem: Skin Injury Risk Increased  Goal: Skin Health and Integrity  Intervention: Optimize Skin Protection  Flowsheets (Taken 5/18/2024 0405)  Pressure Reduction Techniques:   frequent weight shift encouraged   weight shift assistance provided  Skin Protection: incontinence pads utilized  Activity Management: Rolling - L1  Head of Bed (HOB) Positioning: HOB elevated     Problem: Hemodialysis  Goal: Safe, Effective Therapy Delivery  Intervention: Optimize Device Care and Function  Flowsheets (Taken 5/18/2024 0405)  Medication Review/Management: medications reviewed  Goal: Absence of Infection Signs and Symptoms  Intervention: Prevent or Manage Infection  Flowsheets (Taken 5/18/2024 0405)  Infection Prevention:   environmental surveillance performed   hand hygiene promoted   single patient room provided  Infection Management: aseptic technique maintained

## 2024-05-18 NOTE — PROGRESS NOTES
[facility], [facility]  Nurses Note -- 4 Eyes      5/18/2024       Skin assessed on: Q Shift      [] No Pressure Injuries Present    []Prevention Measures Documented    [x] Yes LDA  for Pressure Injury Previously documented     [] Yes New Pressure Injury Discovered   [] LDA for New Pressure Injury Added      Attending RN:  Mike Flores RN     Second RN:  Gabby Sanchez RN

## 2024-05-18 NOTE — ASSESSMENT & PLAN NOTE
I independently reviewed patient's lab work and images as documented. 67 yo male with ESRD on HD, DM and CVA with R sided weakness with recent admission for R ankle OM (maintained on vanc/ancef x 6w, ruben 5/20) admitted for unhealing R wound. ID consulted for abx recs. Hospital course notable for blood cx with CONS, suspected contaminant, repeat blcx ngtd. He is pending angio with vascular and evaluation by ortho for potential surgical disposition. Prior to admission, he denied fevers or chills. Suspect poor wound healing due to decreased blood flow, s/p R angio on 5/16. Repeat R US with significant stenoses post-angio.     Recommendations:  -continue vancomycin pharm to dose and ancef, ruben 5/20  -follow up vascular and ortho recs

## 2024-05-18 NOTE — SUBJECTIVE & OBJECTIVE
Interval History: No fevers documented overnight.   Dressing changed by nursing this morning, denied drainage or odor during dressing change.     Review of Systems   Constitutional:  Negative for chills and fever.   All other systems reviewed and are negative.    Objective:     Vital Signs (Most Recent):  Temp: 97.9 °F (36.6 °C) (05/18/24 1105)  Pulse: 65 (05/18/24 1105)  Resp: 18 (05/18/24 1105)  BP: (!) 141/63 (05/18/24 1105)  SpO2: 99 % (05/18/24 1105) Vital Signs (24h Range):  Temp:  [97.5 °F (36.4 °C)-98.5 °F (36.9 °C)] 97.9 °F (36.6 °C)  Pulse:  [63-75] 65  Resp:  [18] 18  SpO2:  [98 %-100 %] 99 %  BP: (120-185)/(59-82) 141/63     Weight: 79.8 kg (175 lb 14.8 oz)  Body mass index is 26.75 kg/m².    Estimated Creatinine Clearance: 10.5 mL/min (A) (based on SCr of 6.4 mg/dL (H)).     Physical Exam  Constitutional:       Appearance: He is not ill-appearing.   Eyes:      General:         Right eye: No discharge.         Left eye: No discharge.   Pulmonary:      Effort: Pulmonary effort is normal. No respiratory distress.   Skin:     General: Skin is warm and dry.      Comments: Desmond feet wrapped   Neurological:      Mental Status: He is alert and oriented to person, place, and time.          Significant Labs:   Microbiology Results (last 7 days)       Procedure Component Value Units Date/Time    Blood culture [[id number]] Collected: 05/11/24 2006    Order Status: Completed Specimen: Blood from Peripheral, Hand, Right Updated: 05/15/24 2303     Blood Culture, Routine No Growth after 4 days.    Narrative:      could not draw second set cultures, due to patient being a hard   stick. reported to nurse Hidalgo    Blood culture [[id number]] Collected: 05/11/24 1335    Order Status: Completed Specimen: Blood Updated: 05/15/24 1703     Blood Culture, Routine No Growth after 4 days.    Aerobic culture (Specify Source) **CANNOT BE ORDERED AS STAT** [[id number]] Collected: 05/10/24 1703    Order Status: Completed Specimen:  Abscess from Foot, Right Updated: 05/14/24 0723     Aerobic Bacterial Culture No growth    Blood culture #2 [[id number]]  (Abnormal) Collected: 05/10/24 1547    Order Status: Completed Specimen: Blood from Peripheral, Hand, Right Updated: 05/13/24 0948     Blood Culture, Routine Gram stain aer bottle: Gram positive cocci in clusters resembling Staph      Positive results previously called      COAGULASE-NEGATIVE STAPHYLOCOCCUS SPECIES  Organism is a probable contaminant      Narrative:      Blood Culture #2    Blood culture #1 [[id number]]  (Abnormal) Collected: 05/10/24 1545    Order Status: Completed Specimen: Blood from Peripheral, Antecubital, Right Updated: 05/13/24 0948     Blood Culture, Routine Gram stain aer bottle: Gram positive cocci in clusters resembling Staph      Results called to and read back by: Gwen Staley 05/11/2024  09:56      COAGULASE-NEGATIVE STAPHYLOCOCCUS SPECIES  Organism is a probable contaminant      Narrative:      Blood Culture #1            Significant Imaging: I have reviewed all pertinent imaging results/findings within the past 24 hours.

## 2024-05-18 NOTE — PROGRESS NOTES
[facility] - Observation  Infectious Disease  Progress Note    Patient Name: Miguel Moore  MRN: [id number]  Admission Date: 5/10/2024  Length of Stay: 8 days  Attending Physician: Kareem Cardenas MD  Primary Care Provider: Raciel Raymond MD    Isolation Status: No active isolations  Assessment/Plan:      ID  Subacute osteomyelitis of right foot  I independently reviewed patient's lab work and images as documented. 67 yo male with ESRD on HD, DM and CVA with R sided weakness with recent admission for R ankle OM (maintained on vanc/ancef x 6w, ruben 5/20) admitted for unhealing R wound. ID consulted for abx recs. Hospital course notable for blood cx with CONS, suspected contaminant, repeat blcx ngtd. He is pending angio with vascular and evaluation by ortho for potential surgical disposition. Prior to admission, he denied fevers or chills. Suspect poor wound healing due to decreased blood flow, s/p R angio on 5/16. Repeat R US with significant stenoses post-angio.     Recommendations:  -continue vancomycin pharm to dose and ancef, ruben 5/20  -follow up vascular and ortho recs   -if amputation done, may not need prolonged course of abx therapy given source control          Thank you for your consult. I will follow-up with patient. Please contact us if you have any additional questions. Above d/w primary team.       Laney Underwood MD  Infectious Disease  [facility] - Observation    Subjective:     Principal Problem:Open wound of right foot    HPI: 67 yo male with ESRD on HD, DM and CVA with R sided weakness with recent admission for R ankle OM (maintained on vanc/tara x 6w, ruben 5/20) admitted for unhealing R wound. ID consulted for abx recs. Hospital course notable for blood cx with CONS, suspected contaminant, repeat blcx ngtd. He is pending angio with vascular and evaluation by ortho for potential surgical disposition. Prior to admission, he denied fevers or chills.       Interval History: No fevers documented  overnight.   Dressing changed by nursing this morning, denied drainage or odor during dressing change.     Review of Systems   Constitutional:  Negative for chills and fever.   All other systems reviewed and are negative.    Objective:     Vital Signs (Most Recent):  Temp: 97.9 °F (36.6 °C) (05/18/24 1105)  Pulse: 65 (05/18/24 1105)  Resp: 18 (05/18/24 1105)  BP: (!) 141/63 (05/18/24 1105)  SpO2: 99 % (05/18/24 1105) Vital Signs (24h Range):  Temp:  [97.5 °F (36.4 °C)-98.5 °F (36.9 °C)] 97.9 °F (36.6 °C)  Pulse:  [63-75] 65  Resp:  [18] 18  SpO2:  [98 %-100 %] 99 %  BP: (120-185)/(59-82) 141/63     Weight: 79.8 kg (175 lb 14.8 oz)  Body mass index is 26.75 kg/m².    Estimated Creatinine Clearance: 10.5 mL/min (A) (based on SCr of 6.4 mg/dL (H)).     Physical Exam  Constitutional:       Appearance: He is not ill-appearing.   Eyes:      General:         Right eye: No discharge.         Left eye: No discharge.   Pulmonary:      Effort: Pulmonary effort is normal. No respiratory distress.   Skin:     General: Skin is warm and dry.      Comments: Desmond feet wrapped   Neurological:      Mental Status: He is alert and oriented to person, place, and time.          Significant Labs:   Microbiology Results (last 7 days)       Procedure Component Value Units Date/Time    Blood culture [[id number]] Collected: 05/11/24 2006    Order Status: Completed Specimen: Blood from Peripheral, Hand, Right Updated: 05/15/24 2303     Blood Culture, Routine No Growth after 4 days.    Narrative:      could not draw second set cultures, due to patient being a hard   stick. reported to nurse Gwen    Blood culture [[id number]] Collected: 05/11/24 1335    Order Status: Completed Specimen: Blood Updated: 05/15/24 1703     Blood Culture, Routine No Growth after 4 days.    Aerobic culture (Specify Source) **CANNOT BE ORDERED AS STAT** [[id number]] Collected: 05/10/24 1703    Order Status: Completed Specimen: Abscess from Foot, Right Updated:  05/14/24 0723     Aerobic Bacterial Culture No growth    Blood culture #2 [[id number]]  (Abnormal) Collected: 05/10/24 1547    Order Status: Completed Specimen: Blood from Peripheral, Hand, Right Updated: 05/13/24 0948     Blood Culture, Routine Gram stain aer bottle: Gram positive cocci in clusters resembling Staph      Positive results previously called      COAGULASE-NEGATIVE STAPHYLOCOCCUS SPECIES  Organism is a probable contaminant      Narrative:      Blood Culture #2    Blood culture #1 [[id number]]  (Abnormal) Collected: 05/10/24 1545    Order Status: Completed Specimen: Blood from Peripheral, Antecubital, Right Updated: 05/13/24 0948     Blood Culture, Routine Gram stain aer bottle: Gram positive cocci in clusters resembling Staph      Results called to and read back by: Gwen Staley 05/11/2024  09:56      COAGULASE-NEGATIVE STAPHYLOCOCCUS SPECIES  Organism is a probable contaminant      Narrative:      Blood Culture #1            Significant Imaging: I have reviewed all pertinent imaging results/findings within the past 24 hours.

## 2024-05-18 NOTE — PROGRESS NOTES
[facility] Medicine  Progress Note    Patient Name: Miguel Moore  MRN: [id number]  Patient Class: IP- Inpatient   Admission Date: 5/10/2024  Length of Stay: 8 days  Attending Physician: Kareem Cardenas MD  Primary Care Provider: Raciel Raymond MD        Subjective:     Principal Problem:Open wound of right foot        HPI:  68 y.o. male with hypertension, BPH, chronic right lower extremity DVT, DM2, dyslipidemia, ESRD on HD, hemiplegia and hemiparesis following cerebral infarction affecting the right dominant side, paroxysmal AFib, seizure disorder as sequela of CVA sent to the ED from wound care for new wound infection.  Denies fever, chills, cough, shortness breath, chest pain, dizziness, syncope.  Was hospitalized in March for cellulitis and abscess to the right foot with surgical intervention at that time.  In the ED today, CRP and procalcitonin elevated.  Blood and wound cultures were obtained.  IV antibiotics initiated.    Overview/Hospital Course:  67yo M ESRD, PAD, R foot wounds s/p multiple surgeries admitted w worsening wound. Did have Staph epi in blood culture, likely contaminant. He does not want further debridement, wants BKA if needed. Started vancomycin and meropenem given his history of ESBL organisms. [facility]o Bone and Joint consulted. Vascular consulted- plan for RLE angiogram on 05/16 . Nephro following for HD. He also has L heel dark area, XR left heel with Osseous structures demonstrate no evidence for acute fracture or osseous destructive lesion.  Mild diffuse demineralization. wound care consulted.       Interval History:     5/18/24  Assumed care today, pt seen and examined, chart reviewed.  Pt sitting up in bed and nursing providing Wound Care to feet.  He is w/o acute c/o.  Per ID current abx therapy to be continued thru 5/20.  Vascular and Ortho are to decide w/ pt whether to amputate RLE, pt hoping at this point s/p RLE revascularization to avoid  "surgery/amputation.      Review of Systems    11 pt ROS negative except as noted o/w    Objective:     Vital Signs (Most Recent):  Temp: 97.9 °F (36.6 °C) (05/18/24 1105)  Pulse: 65 (05/18/24 1105)  Resp: 18 (05/18/24 1105)  BP: (!) 141/63 (05/18/24 1105)  SpO2: 99 % (05/18/24 1105) Vital Signs (24h Range):  Temp:  [97.5 °F (36.4 °C)-98.5 °F (36.9 °C)] 97.9 °F (36.6 °C)  Pulse:  [63-75] 65  Resp:  [18] 18  SpO2:  [98 %-100 %] 99 %  BP: (120-185)/(59-80) 141/63     Weight: 79.8 kg (175 lb 14.8 oz)  Body mass index is 26.75 kg/m².    Intake/Output Summary (Last 24 hours) at 5/18/2024 1254  Last data filed at 5/18/2024 1038  Gross per 24 hour   Intake 620 ml   Output 3000 ml   Net -2380 ml         Physical Exam      General:  A&O, NAD  HEENT: neck supple, PERRL/EOMI, EAC clear bilaterally, normal bilateral carotid upstroke w/o bruits, inf turbs clear bilaterally, OC/OP clear  Lungs: CTAB  CV: RRR w/o M/G/R  Abdomen: soft, NTND, no HSM, no bruits/masses/pulsations  Ext: BLE in bulky dressings and offloading boots - RN removing dressings and starting w/ wound care  : def  Rectal: def  Neuro: RUE contracture    Significant Labs: All pertinent labs within the past 24 hours have been reviewed.    Significant Imaging: I have reviewed all pertinent imaging results/findings within the past 24 hours.    Assessment/Plan:      * Open wound of right foot  Per ID continue on vancomycin and cefazolin thru 5/20  S/p revascularization RLE as noted  BCX NGTD after initial BCX likely contaminant coag neg staph  Ortho following re: possible need for right BKA  multidisciplinary discussion of healing potential.      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 Will check post procedure flow with LOBO " "and toe pressures to evaluate healing potential. Continue wound care, offloading and optimization of comorbid conditions. Nutrition optimization. Will continue to follow"      Paroxysmal atrial fibrillation  CHADSVASC2 = 3  Eliquis has been on hold d/t peripheral intervention done 5/16, pt on heparin gtt        Anemia in chronic kidney disease  Stable  trend    Type 2 diabetes mellitus with diabetic neuropathy, with long-term current use of insulin  A1c 5.8%  SSI      Secondary hyperparathyroidism of renal origin  Continue sevelamer      ESRD (end stage renal disease)  HD MWF per renal      Hemiplegia and hemiparesis following cerebral infarction affecting right dominant side  No acute issues  Secondary preventive measures    BPH (benign prostatic hyperplasia) 2/18/21 prostate bx normal  Continue home regimen of tamsulosin and finasteride    Dyslipidemia  Statin was held d/t transaminitis  Resumed, trend LFT on atorva 80 mg    Benign essential HTN  Home regimen is unusual, labetalol 100 mg once daily  BP labile, HR in 60s  Add low dose amlodipine  Pt has intolerance of ACEI (hyperkalemia)      VTE Risk Mitigation (From admission, onward)           Ordered     heparin 25,000 units in dextrose 5% (100 units/ml) IV bolus from bag HIGH INTENSITY nomogram - [facility]  As needed (PRN)        Question:  Heparin Infusion Adjustment (DO NOT MODIFY ANSWER)  Answer:  \\[email or web address]\epic\Images\Pharmacy\HeparinInfusions\heparin HIGH INTENSITY nomogram for [facility] [id number].pdf    05/14/24 1642     heparin 25,000 units in dextrose 5% (100 units/ml) IV bolus from bag HIGH INTENSITY nomogram - [facility]  As needed (PRN)        Question:  Heparin Infusion Adjustment (DO NOT MODIFY ANSWER)  Answer:  \[employer]\epic\Images\Pharmacy\HeparinInfusions\heparin HIGH INTENSITY nomogram for [facility] [id number].pdf    05/14/24 1642     heparin 25,000 units in dextrose 5% 250 mL (100 units/mL) infusion HIGH INTENSITY nomogram - [facility]  Continuous        Question:  Begin " at (units/kg/hr)  Answer:  18 05/14/24 1642     heparin 25,000 units in dextrose 5% 250 mL (100 units/mL) infusion HIGH INTENSITY nomogram - [facility]  Continuous        Question:  Begin at (units/kg/hr)  Answer:  18 05/13/24 1416     IP VTE HIGH RISK PATIENT  Once         05/10/24 1758     Place sequential compression device  Until discontinued         05/10/24 1758     Reason for No Pharmacological VTE Prophylaxis  Once        Question:  Reasons:  Answer:  Already adequately anticoagulated on oral Anticoagulants    05/10/24 1758                    Discharge Planning   GARY:      Code Status: Full Code   Is the patient medically ready for discharge?:     Reason for patient still in hospital (select all that apply): Patient trending condition  Discharge Plan A: Home with family   Discharge Delays: None known at this time    Time spent in direct patient care, review of data, conversation w/ patient and/or family, and moderately complex MDM 40 minutes            Kareem Cardenas MD  Department of Hospital Medicine   [facility] - Observation

## 2024-05-18 NOTE — ASSESSMENT & PLAN NOTE
"Per Dr Lim 5/16  "successful right popliteal angioplasty with improved flow to RLE and foot. Will check post procedure flow with LOBO and toe pressures to evaluate healing potential. Continue wound care, offloading and optimization of comorbid conditions. Nutrition optimization. Will continue to follow"    "

## 2024-05-18 NOTE — ASSESSMENT & PLAN NOTE
Per ID continue on vancomycin and cefazolin thru 5/20  S/p revascularization RLE as noted  BCX NGTD after initial BCX likely contaminant coag neg staph  Ortho following re: possible need for right BKA  multidisciplinary discussion of healing potential.

## 2024-05-18 NOTE — ASSESSMENT & PLAN NOTE
Home regimen is unusual, labetalol 100 mg once daily  BP labile, HR in 60s  Add low dose amlodipine  Pt has intolerance of ACEI (hyperkalemia)

## 2024-05-18 NOTE — SUBJECTIVE & OBJECTIVE
Interval History:     5/18/24  Assumed care today, pt seen and examined, chart reviewed.  Pt sitting up in bed and nursing providing Wound Care to feet.  He is w/o acute c/o.  Per ID current abx therapy to be continued thru 5/20.  Vascular and Ortho are to decide w/ pt whether to amputate RLE, pt hoping at this point s/p RLE revascularization to avoid surgery/amputation.      Review of Systems    11 pt ROS negative except as noted o/w    Objective:     Vital Signs (Most Recent):  Temp: 97.9 °F (36.6 °C) (05/18/24 1105)  Pulse: 65 (05/18/24 1105)  Resp: 18 (05/18/24 1105)  BP: (!) 141/63 (05/18/24 1105)  SpO2: 99 % (05/18/24 1105) Vital Signs (24h Range):  Temp:  [97.5 °F (36.4 °C)-98.5 °F (36.9 °C)] 97.9 °F (36.6 °C)  Pulse:  [63-75] 65  Resp:  [18] 18  SpO2:  [98 %-100 %] 99 %  BP: (120-185)/(59-80) 141/63     Weight: 79.8 kg (175 lb 14.8 oz)  Body mass index is 26.75 kg/m².    Intake/Output Summary (Last 24 hours) at 5/18/2024 1254  Last data filed at 5/18/2024 1038  Gross per 24 hour   Intake 620 ml   Output 3000 ml   Net -2380 ml         Physical Exam      General:  A&O, NAD  HEENT: neck supple, PERRL/EOMI, EAC clear bilaterally, normal bilateral carotid upstroke w/o bruits, inf turbs clear bilaterally, OC/OP clear  Lungs: CTAB  CV: RRR w/o M/G/R  Abdomen: soft, NTND, no HSM, no bruits/masses/pulsations  Ext: BLE in bulky dressings and offloading boots - RN removing dressings and starting w/ wound care  : def  Rectal: def  Neuro: RUE contracture    Significant Labs: All pertinent labs within the past 24 hours have been reviewed.    Significant Imaging: I have reviewed all pertinent imaging results/findings within the past 24 hours.

## 2024-05-19 LAB
ALBUMIN SERPL BCP-MCNC: 1.8 G/DL (ref 3.5–5.2)
ANION GAP SERPL CALC-SCNC: 9 MMOL/L (ref 8–16)
APTT PPP: 47.6 SEC (ref 21–32)
APTT PPP: 49.9 SEC (ref 21–32)
APTT PPP: 76.3 SEC (ref 21–32)
BASOPHILS # BLD AUTO: 0.04 K/UL (ref 0–0.2)
BASOPHILS NFR BLD: 1 % (ref 0–1.9)
BUN SERPL-MCNC: 24 MG/DL (ref 8–23)
CALCIUM SERPL-MCNC: 7.9 MG/DL (ref 8.7–10.5)
CHLORIDE SERPL-SCNC: 99 MMOL/L (ref 95–110)
CO2 SERPL-SCNC: 24 MMOL/L (ref 23–29)
CREAT SERPL-MCNC: 5.8 MG/DL (ref 0.5–1.4)
DIFFERENTIAL METHOD BLD: ABNORMAL
EOSINOPHIL # BLD AUTO: 0.2 K/UL (ref 0–0.5)
EOSINOPHIL NFR BLD: 4.6 % (ref 0–8)
ERYTHROCYTE [DISTWIDTH] IN BLOOD BY AUTOMATED COUNT: 18.4 % (ref 11.5–14.5)
EST. GFR  (NO RACE VARIABLE): 10 ML/MIN/1.73 M^2
GLUCOSE SERPL-MCNC: 86 MG/DL (ref 70–110)
HCT VFR BLD AUTO: 27.9 % (ref 40–54)
HGB BLD-MCNC: 9 G/DL (ref 14–18)
IMM GRANULOCYTES # BLD AUTO: 0.02 K/UL (ref 0–0.04)
IMM GRANULOCYTES NFR BLD AUTO: 0.5 % (ref 0–0.5)
LYMPHOCYTES # BLD AUTO: 1.2 K/UL (ref 1–4.8)
LYMPHOCYTES NFR BLD: 29.8 % (ref 18–48)
MCH RBC QN AUTO: 26.4 PG (ref 27–31)
MCHC RBC AUTO-ENTMCNC: 32.3 G/DL (ref 32–36)
MCV RBC AUTO: 82 FL (ref 82–98)
MONOCYTES # BLD AUTO: 0.5 K/UL (ref 0.3–1)
MONOCYTES NFR BLD: 11.5 % (ref 4–15)
NEUTROPHILS # BLD AUTO: 2.2 K/UL (ref 1.8–7.7)
NEUTROPHILS NFR BLD: 52.6 % (ref 38–73)
NRBC BLD-RTO: 0 /100 WBC
PHOSPHATE SERPL-MCNC: 2.9 MG/DL (ref 2.7–4.5)
PLATELET # BLD AUTO: 158 K/UL (ref 150–450)
PMV BLD AUTO: 9.2 FL (ref 9.2–12.9)
POCT GLUCOSE: 110 MG/DL (ref 70–110)
POCT GLUCOSE: 135 MG/DL (ref 70–110)
POCT GLUCOSE: 137 MG/DL (ref 70–110)
POCT GLUCOSE: 152 MG/DL (ref 70–110)
POTASSIUM SERPL-SCNC: 4.4 MMOL/L (ref 3.5–5.1)
RBC # BLD AUTO: 3.41 M/UL (ref 4.6–6.2)
SODIUM SERPL-SCNC: 132 MMOL/L (ref 136–145)
WBC # BLD AUTO: 4.16 K/UL (ref 3.9–12.7)

## 2024-05-19 PROCEDURE — 25000003 PHARM REV CODE 250: Mod: HCNC | Performed by: HOSPITALIST

## 2024-05-19 PROCEDURE — 85730 THROMBOPLASTIN TIME PARTIAL: CPT | Mod: 91,HCNC | Performed by: HOSPITALIST

## 2024-05-19 PROCEDURE — 36415 COLL VENOUS BLD VENIPUNCTURE: CPT | Mod: HCNC | Performed by: STUDENT IN AN ORGANIZED HEALTH CARE EDUCATION/TRAINING PROGRAM

## 2024-05-19 PROCEDURE — 80069 RENAL FUNCTION PANEL: CPT | Mod: HCNC | Performed by: HOSPITALIST

## 2024-05-19 PROCEDURE — 11000001 HC ACUTE MED/SURG PRIVATE ROOM: Mod: HCNC

## 2024-05-19 PROCEDURE — 25000003 PHARM REV CODE 250: Mod: HCNC | Performed by: STUDENT IN AN ORGANIZED HEALTH CARE EDUCATION/TRAINING PROGRAM

## 2024-05-19 PROCEDURE — 63600175 PHARM REV CODE 636 W HCPCS: Mod: HCNC | Performed by: STUDENT IN AN ORGANIZED HEALTH CARE EDUCATION/TRAINING PROGRAM

## 2024-05-19 PROCEDURE — 36415 COLL VENOUS BLD VENIPUNCTURE: CPT | Mod: HCNC | Performed by: HOSPITALIST

## 2024-05-19 PROCEDURE — 85025 COMPLETE CBC W/AUTO DIFF WBC: CPT | Mod: HCNC | Performed by: STUDENT IN AN ORGANIZED HEALTH CARE EDUCATION/TRAINING PROGRAM

## 2024-05-19 PROCEDURE — 94760 N-INVAS EAR/PLS OXIMETRY 1: CPT | Mod: HCNC

## 2024-05-19 PROCEDURE — 85730 THROMBOPLASTIN TIME PARTIAL: CPT | Mod: HCNC | Performed by: STUDENT IN AN ORGANIZED HEALTH CARE EDUCATION/TRAINING PROGRAM

## 2024-05-19 RX ADMIN — ASPIRIN 81 MG: 81 TABLET, COATED ORAL at 08:05

## 2024-05-19 RX ADMIN — SEVELAMER CARBONATE 800 MG: 800 TABLET, FILM COATED ORAL at 08:05

## 2024-05-19 RX ADMIN — LABETALOL HYDROCHLORIDE 100 MG: 100 TABLET, FILM COATED ORAL at 08:05

## 2024-05-19 RX ADMIN — POLYETHYLENE GLYCOL 3350 17 G: 17 POWDER, FOR SOLUTION ORAL at 09:05

## 2024-05-19 RX ADMIN — DEXTROSE MONOHYDRATE 1 G: 2.5 INJECTION INTRAVENOUS at 08:05

## 2024-05-19 RX ADMIN — AMLODIPINE BESYLATE 2.5 MG: 2.5 TABLET ORAL at 08:05

## 2024-05-19 RX ADMIN — SEVELAMER CARBONATE 800 MG: 800 TABLET, FILM COATED ORAL at 05:05

## 2024-05-19 RX ADMIN — TAMSULOSIN HYDROCHLORIDE 0.8 MG: 0.4 CAPSULE ORAL at 08:05

## 2024-05-19 RX ADMIN — SEVELAMER CARBONATE 800 MG: 800 TABLET, FILM COATED ORAL at 12:05

## 2024-05-19 RX ADMIN — Medication 1 CAPSULE: at 08:05

## 2024-05-19 RX ADMIN — ATORVASTATIN CALCIUM 80 MG: 40 TABLET, FILM COATED ORAL at 08:05

## 2024-05-19 RX ADMIN — HEPARIN SODIUM 14 UNITS/KG/HR: 10000 INJECTION, SOLUTION INTRAVENOUS at 12:05

## 2024-05-19 RX ADMIN — INSULIN ASPART 2 UNITS: 100 INJECTION, SOLUTION INTRAVENOUS; SUBCUTANEOUS at 04:05

## 2024-05-19 RX ADMIN — Medication 6 MG: at 09:05

## 2024-05-19 RX ADMIN — FINASTERIDE 5 MG: 5 TABLET, FILM COATED ORAL at 08:05

## 2024-05-19 NOTE — PROGRESS NOTES
[facility] Medicine  Progress Note    Patient Name: Miguel Moore  MRN: [id number]  Patient Class: IP- Inpatient   Admission Date: 5/10/2024  Length of Stay: 9 days  Attending Physician: Kareem Cardenas MD  Primary Care Provider: Raciel Raymond MD        Subjective:     Principal Problem:Open wound of right foot        HPI:  68 y.o. male with hypertension, BPH, chronic right lower extremity DVT, DM2, dyslipidemia, ESRD on HD, hemiplegia and hemiparesis following cerebral infarction affecting the right dominant side, paroxysmal AFib, seizure disorder as sequela of CVA sent to the ED from wound care for new wound infection.  Denies fever, chills, cough, shortness breath, chest pain, dizziness, syncope.  Was hospitalized in March for cellulitis and abscess to the right foot with surgical intervention at that time.  In the ED today, CRP and procalcitonin elevated.  Blood and wound cultures were obtained.  IV antibiotics initiated.    Overview/Hospital Course:  67yo M ESRD, PAD, R foot wounds s/p multiple surgeries admitted w worsening wound. Did have Staph epi in blood culture, likely contaminant. He does not want further debridement, wants BKA if needed. Started vancomycin and meropenem given his history of ESBL organisms. Ortho Bone and Joint consulted. Vascular consulted- plan for RLE angiogram on 05/16 . Nephro following for HD. He also has L heel dark area, XR left heel with Osseous structures demonstrate no evidence for acute fracture or osseous destructive lesion.  Mild diffuse demineralization. wound care consulted.       Interval History:     5/18/24  Assumed care today, pt seen and examined, chart reviewed.  Pt sitting up in bed and nursing providing Wound Care to feet.  He is w/o acute c/o.  Per ID current abx therapy to be continued thru 5/20.  Vascular and Ortho are to decide w/ pt whether to amputate RLE, pt hoping at this point s/p RLE revascularization to avoid  "surgery/amputation.    5/19/24  Sitting up in bed eating lunch, no acute c/o, feels "pretty good".  + BM, minimal pain. On heparin gtt bridge pending disposition from vascular and ortho re: limb salvage.  Appears likely at this point that we will not pursue BKA, rather will resume OAC and PVD treatment along w/ abx for cellulitis/OM.  Pt was ambulatory PTA so presuming no surgery will ask PT to work on mobility.     Review of Systems    11 pt ROS negative except as noted o/w    Objective:     Vital Signs (Most Recent):  Temp: 97.6 °F (36.4 °C) (05/19/24 1130)  Pulse: 63 (05/19/24 1130)  Resp: 18 (05/19/24 1130)  BP: (!) 163/72 (05/19/24 1130)  SpO2: (!) 93 % (05/19/24 1130) Vital Signs (24h Range):  Temp:  [97.5 °F (36.4 °C)-98.4 °F (36.9 °C)] 97.6 °F (36.4 °C)  Pulse:  [60-70] 63  Resp:  [18] 18  SpO2:  [93 %-100 %] 93 %  BP: (131-173)/(63-76) 163/72     Weight: 79.8 kg (175 lb 14.8 oz)  Body mass index is 26.75 kg/m².    Intake/Output Summary (Last 24 hours) at 5/19/2024 1442  Last data filed at 5/19/2024 1012  Gross per 24 hour   Intake 360 ml   Output --   Net 360 ml         Physical Exam      General:  A&O, NAD  HEENT: neck supple, PERRL/EOMI, EAC clear bilaterally, normal bilateral carotid upstroke w/o bruits, inf turbs clear bilaterally, OC/OP clear  Lungs: CTAB  CV: RRR w/ systolic murmur LSB  Abdomen: soft, NTND, no HSM, no bruits/masses/pulsations  Ext: no edema, both feet in offloading devices, toes warm/dry, feet wrapped  :def  Rectal: def  Neuro: RUE contracture and paresis, has more movement in RLE    Significant Labs: All pertinent labs within the past 24 hours have been reviewed.    Significant Imaging: I have reviewed all pertinent imaging results/findings within the past 24 hours.    Assessment/Plan:      * Open wound of right foot  Per ID continue on vancomycin and cefazolin thru 5/20 (tomorrow)  S/p revascularization RLE as noted  BCX NGTD after initial BCX likely contaminant coag neg " "staph  Ortho following re: possible need for right BKA  multidisciplinary discussion of healing potential  Appears likely at this point that amputation will NOT be pursued  I note that recent xrays and CT right ankle March do NOT show any convincing evidence of osteomyelitis  Home soon? SNF?      Atherosclerosis of native artery of right lower extremity with ulceration  As discussed under PAD    Subacute osteomyelitis of right foot  As per ID recs  Consider MRI of foot  Repeat ESR which was 88 May 10        Peripheral artery disease  Per Dr Lim 5/16  "successful right popliteal angioplasty with improved flow to RLE and foot. Will check post procedure flow with LOBO and toe pressures to evaluate healing potential. Continue wound care, offloading and optimization of comorbid conditions. Nutrition optimization. Will continue to follow"      Paroxysmal atrial fibrillation  CHADSVASC2 = 3  Eliquis has been on hold d/t peripheral intervention done 5/16, pt on heparin gtt        Anemia in chronic kidney disease  Stable  trend    Type 2 diabetes mellitus with diabetic neuropathy, with long-term current use of insulin  A1c 5.8%  SSI      Secondary hyperparathyroidism of renal origin  Continue sevelamer      ESRD (end stage renal disease)  HD MWF per renal      Hemiplegia and hemiparesis following cerebral infarction affecting right dominant side  No acute issues  Secondary preventive measures    BPH (benign prostatic hyperplasia) 2/18/21 prostate bx normal  Continue home regimen of tamsulosin and finasteride    Dyslipidemia  Statin was held d/t transaminitis  Resumed, trend LFT on atorva 80 mg    Benign essential HTN  Home regimen is unusual, labetalol 100 mg once daily  BP labile, HR in 60s  Added low dose amlodipine, BP improved a bit today  Pt has intolerance of ACEI (hyperkalemia)      VTE Risk Mitigation (From admission, onward)           Ordered     heparin 25,000 units in dextrose 5% (100 units/ml) IV bolus from " bag HIGH INTENSITY nomogram - [facility]  As needed (PRN)        Question:  Heparin Infusion Adjustment (DO NOT MODIFY ANSWER)  Answer:  \\[email or web address]\epic\Images\Pharmacy\HeparinInfusions\heparin HIGH INTENSITY nomogram for [facility] [id number].pdf    05/14/24 1642     heparin 25,000 units in dextrose 5% (100 units/ml) IV bolus from bag HIGH INTENSITY nomogram - [facility]  As needed (PRN)        Question:  Heparin Infusion Adjustment (DO NOT MODIFY ANSWER)  Answer:  \\[email or web address]\epic\Images\Pharmacy\HeparinInfusions\heparin HIGH INTENSITY nomogram for [facility] [id number].pdf    05/14/24 1642     heparin 25,000 units in dextrose 5% 250 mL (100 units/mL) infusion HIGH INTENSITY nomogram - [facility]  Continuous        Question:  Begin at (units/kg/hr)  Answer:  18    05/14/24 1642     heparin 25,000 units in dextrose 5% 250 mL (100 units/mL) infusion HIGH INTENSITY nomogram - [facility]  Continuous        Question:  Begin at (units/kg/hr)  Answer:  18 05/13/24 1416     IP VTE HIGH RISK PATIENT  Once         05/10/24 1758     Place sequential compression device  Until discontinued         05/10/24 1758     Reason for No Pharmacological VTE Prophylaxis  Once        Question:  Reasons:  Answer:  Already adequately anticoagulated on oral Anticoagulants    05/10/24 1758                    Discharge Planning   GARY:      Code Status: Full Code   Is the patient medically ready for discharge?:     Reason for patient still in hospital (select all that apply): Patient trending condition  Discharge Plan A: Home with family   Discharge Delays: None known at this time      Time spent in direct patient care, review of data, conversation w/ patient and/or family, and moderately complex MDM 35 minutes          Kareem Cardenas MD  Department of Hospital Medicine   [facility] - Observation

## 2024-05-19 NOTE — ASSESSMENT & PLAN NOTE
Home regimen is unusual, labetalol 100 mg once daily  BP labile, HR in 60s  Added low dose amlodipine, BP improved a bit today  Pt has intolerance of ACEI (hyperkalemia)

## 2024-05-19 NOTE — PROGRESS NOTES
[facility]  Nurses Note -- 4 Eyes      5/19/2024       Skin assessed on: Q Shift      [] No Pressure Injuries Present    []Prevention Measures Documented    [x] Yes LDA  for Pressure Injury Previously documented     [] Yes New Pressure Injury Discovered   [] LDA for New Pressure Injury Added      Attending RN:  Mike Flores RN     Second RN:  Gabby Sanchez RN

## 2024-05-19 NOTE — SUBJECTIVE & OBJECTIVE
"Interval History:     5/18/24  Assumed care today, pt seen and examined, chart reviewed.  Pt sitting up in bed and nursing providing Wound Care to feet.  He is w/o acute c/o.  Per ID current abx therapy to be continued thru 5/20.  Vascular and Ortho are to decide w/ pt whether to amputate RLE, pt hoping at this point s/p RLE revascularization to avoid surgery/amputation.    5/19/24  Sitting up in bed eating lunch, no acute c/o, feels "pretty good".  + BM, minimal pain. On heparin gtt bridge pending disposition from vascular and ortho re: limb salvage.  Appears likely at this point that we will not pursue BKA, rather will resume OAC and PVD treatment along w/ abx for cellulitis/OM.  Pt was ambulatory PTA so presuming no surgery will ask PT to work on mobility.     Review of Systems    11 pt ROS negative except as noted o/w    Objective:     Vital Signs (Most Recent):  Temp: 97.6 °F (36.4 °C) (05/19/24 1130)  Pulse: 63 (05/19/24 1130)  Resp: 18 (05/19/24 1130)  BP: (!) 163/72 (05/19/24 1130)  SpO2: (!) 93 % (05/19/24 1130) Vital Signs (24h Range):  Temp:  [97.5 °F (36.4 °C)-98.4 °F (36.9 °C)] 97.6 °F (36.4 °C)  Pulse:  [60-70] 63  Resp:  [18] 18  SpO2:  [93 %-100 %] 93 %  BP: (131-173)/(63-76) 163/72     Weight: 79.8 kg (175 lb 14.8 oz)  Body mass index is 26.75 kg/m².    Intake/Output Summary (Last 24 hours) at 5/19/2024 1442  Last data filed at 5/19/2024 1012  Gross per 24 hour   Intake 360 ml   Output --   Net 360 ml         Physical Exam      General:  A&O, NAD  HEENT: neck supple, PERRL/EOMI, EAC clear bilaterally, normal bilateral carotid upstroke w/o bruits, inf turbs clear bilaterally, OC/OP clear  Lungs: CTAB  CV: RRR w/ systolic murmur LSB  Abdomen: soft, NTND, no HSM, no bruits/masses/pulsations  Ext: no edema, both feet in offloading devices, toes warm/dry, feet wrapped  :def  Rectal: def  Neuro: RUE contracture and paresis, has more movement in RLE    Significant Labs: All pertinent labs within the past " 24 hours have been reviewed.    Significant Imaging: I have reviewed all pertinent imaging results/findings within the past 24 hours.

## 2024-05-19 NOTE — ASSESSMENT & PLAN NOTE
Per ID continue on vancomycin and cefazolin thru 5/20 (tomorrow)  S/p revascularization RLE as noted  BCX NGTD after initial BCX likely contaminant coag neg staph  Ortho following re: possible need for right BKA  multidisciplinary discussion of healing potential  Appears likely at this point that amputation will NOT be pursued  I note that recent xrays and CT right ankle March do NOT show any convincing evidence of osteomyelitis  Home soon? SNF?

## 2024-05-19 NOTE — PLAN OF CARE
Problem: Adult Inpatient Plan of Care  Goal: Absence of Hospital-Acquired Illness or Injury  Intervention: Identify and Manage Fall Risk  Flowsheets (Taken 5/19/2024 0322)  Safety Promotion/Fall Prevention:   assistive device/personal item within reach   bed alarm set   diversional activities provided   Fall Risk reviewed with patient/family   lighting adjusted   medications reviewed   room near unit station   side rails raised x 2  Intervention: Prevent Skin Injury  Flowsheets (Taken 5/19/2024 0322)  Body Position: position changed independently  Skin Protection: incontinence pads utilized  Device Skin Pressure Protection: absorbent pad utilized/changed  Intervention: Prevent Infection  Flowsheets (Taken 5/19/2024 0322)  Infection Prevention:   environmental surveillance performed   hand hygiene promoted   single patient room provided  Goal: Optimal Comfort and Wellbeing  Intervention: Monitor Pain and Promote Comfort  Flowsheets (Taken 5/19/2024 0322)  Pain Management Interventions: care clustered  Intervention: Provide Person-Centered Care  Flowsheets (Taken 5/19/2024 0322)  Trust Relationship/Rapport:   care explained   choices provided     Problem: Infection  Goal: Absence of Infection Signs and Symptoms  Intervention: Prevent or Manage Infection  Flowsheets (Taken 5/19/2024 0322)  Infection Management: aseptic technique maintained     Problem: Diabetes Comorbidity  Goal: Blood Glucose Level Within Targeted Range  Intervention: Monitor and Manage Glycemia  Flowsheets (Taken 5/19/2024 0322)  Glycemic Management: blood glucose monitored     Problem: Wound  Goal: Optimal Coping  Intervention: Support Patient and Family Response  Flowsheets (Taken 5/19/2024 0322)  Supportive Measures: active listening utilized  Goal: Absence of Infection Signs and Symptoms  Intervention: Prevent or Manage Infection  Flowsheets (Taken 5/19/2024 0322)  Infection Management: aseptic technique maintained  Goal: Skin Health and  Integrity  Intervention: Optimize Skin Protection  Flowsheets (Taken 5/19/2024 0322)  Pressure Reduction Techniques: frequent weight shift encouraged  Skin Protection: incontinence pads utilized  Activity Management: Rolling - L1  Head of Bed (HOB) Positioning: HOB elevated  Goal: Optimal Wound Healing  Intervention: Promote Wound Healing  Flowsheets (Taken 5/19/2024 0322)  Sleep/Rest Enhancement: awakenings minimized     Problem: Skin Injury Risk Increased  Goal: Skin Health and Integrity  Intervention: Optimize Skin Protection  Flowsheets (Taken 5/19/2024 0322)  Pressure Reduction Techniques: frequent weight shift encouraged  Skin Protection: incontinence pads utilized  Activity Management: Rolling - L1  Head of Bed (HOB) Positioning: HOB elevated     Problem: Hemodialysis  Goal: Safe, Effective Therapy Delivery  Intervention: Optimize Device Care and Function  Flowsheets (Taken 5/19/2024 0322)  Medication Review/Management: medications reviewed  Goal: Absence of Infection Signs and Symptoms  Intervention: Prevent or Manage Infection  Flowsheets (Taken 5/19/2024 0322)  Infection Prevention:   environmental surveillance performed   hand hygiene promoted   single patient room provided  Infection Management: aseptic technique maintained

## 2024-05-19 NOTE — NURSING
Nurses Note -- 4 Eyes      5/19/2024   12:43 AM      Skin assessed during: Q Shift Change      [] No Altered Skin Integrity Present    []Prevention Measures Documented      [x] Yes LDA for Pressure Injury Previously documented     [] LDA Added if Not in Epic (Describe Wound)   [] New Altered Skin Integrity was Present on Admit and Documented in LDA   [] Wound Image Taken      Attending Nurse:  Gabby Kaminski RN/Staff Member:  Mike

## 2024-05-20 LAB
ALBUMIN SERPL BCP-MCNC: 1.9 G/DL (ref 3.5–5.2)
ALP SERPL-CCNC: 63 U/L (ref 55–135)
ALT SERPL W/O P-5'-P-CCNC: <5 U/L (ref 10–44)
ANION GAP SERPL CALC-SCNC: 10 MMOL/L (ref 8–16)
APTT PPP: 46.1 SEC (ref 21–32)
AST SERPL-CCNC: 12 U/L (ref 10–40)
BASOPHILS # BLD AUTO: 0.02 K/UL (ref 0–0.2)
BASOPHILS NFR BLD: 0.5 % (ref 0–1.9)
BILIRUB SERPL-MCNC: 0.3 MG/DL (ref 0.1–1)
BUN SERPL-MCNC: 34 MG/DL (ref 8–23)
CALCIUM SERPL-MCNC: 8.2 MG/DL (ref 8.7–10.5)
CHLORIDE SERPL-SCNC: 97 MMOL/L (ref 95–110)
CO2 SERPL-SCNC: 23 MMOL/L (ref 23–29)
CREAT SERPL-MCNC: 7 MG/DL (ref 0.5–1.4)
DIFFERENTIAL METHOD BLD: ABNORMAL
EOSINOPHIL # BLD AUTO: 0.2 K/UL (ref 0–0.5)
EOSINOPHIL NFR BLD: 5.3 % (ref 0–8)
ERYTHROCYTE [DISTWIDTH] IN BLOOD BY AUTOMATED COUNT: 18 % (ref 11.5–14.5)
EST. GFR  (NO RACE VARIABLE): 8 ML/MIN/1.73 M^2
GLUCOSE SERPL-MCNC: 89 MG/DL (ref 70–110)
HCT VFR BLD AUTO: 29.1 % (ref 40–54)
HGB BLD-MCNC: 9.3 G/DL (ref 14–18)
IMM GRANULOCYTES # BLD AUTO: 0.01 K/UL (ref 0–0.04)
IMM GRANULOCYTES NFR BLD AUTO: 0.3 % (ref 0–0.5)
LYMPHOCYTES # BLD AUTO: 1.1 K/UL (ref 1–4.8)
LYMPHOCYTES NFR BLD: 27.5 % (ref 18–48)
MCH RBC QN AUTO: 25.8 PG (ref 27–31)
MCHC RBC AUTO-ENTMCNC: 32 G/DL (ref 32–36)
MCV RBC AUTO: 81 FL (ref 82–98)
MONOCYTES # BLD AUTO: 0.4 K/UL (ref 0.3–1)
MONOCYTES NFR BLD: 10.8 % (ref 4–15)
NEUTROPHILS # BLD AUTO: 2.2 K/UL (ref 1.8–7.7)
NEUTROPHILS NFR BLD: 55.6 % (ref 38–73)
NRBC BLD-RTO: 0 /100 WBC
PLATELET # BLD AUTO: 163 K/UL (ref 150–450)
PMV BLD AUTO: 9.6 FL (ref 9.2–12.9)
POCT GLUCOSE: 103 MG/DL (ref 70–110)
POCT GLUCOSE: 117 MG/DL (ref 70–110)
POCT GLUCOSE: 130 MG/DL (ref 70–110)
POCT GLUCOSE: 145 MG/DL (ref 70–110)
POTASSIUM SERPL-SCNC: 4.4 MMOL/L (ref 3.5–5.1)
PROT SERPL-MCNC: 5.3 G/DL (ref 6–8.4)
RBC # BLD AUTO: 3.6 M/UL (ref 4.6–6.2)
SODIUM SERPL-SCNC: 130 MMOL/L (ref 136–145)
VANCOMYCIN SERPL-MCNC: 17.9 UG/ML
WBC # BLD AUTO: 3.97 K/UL (ref 3.9–12.7)

## 2024-05-20 PROCEDURE — 63600175 PHARM REV CODE 636 W HCPCS: Mod: JZ,JG,HCNC | Performed by: INTERNAL MEDICINE

## 2024-05-20 PROCEDURE — 99232 SBSQ HOSP IP/OBS MODERATE 35: CPT | Mod: HCNC,,, | Performed by: STUDENT IN AN ORGANIZED HEALTH CARE EDUCATION/TRAINING PROGRAM

## 2024-05-20 PROCEDURE — 25000003 PHARM REV CODE 250: Mod: HCNC | Performed by: STUDENT IN AN ORGANIZED HEALTH CARE EDUCATION/TRAINING PROGRAM

## 2024-05-20 PROCEDURE — 63600175 PHARM REV CODE 636 W HCPCS: Mod: HCNC | Performed by: STUDENT IN AN ORGANIZED HEALTH CARE EDUCATION/TRAINING PROGRAM

## 2024-05-20 PROCEDURE — 80100016 HC MAINTENANCE HEMODIALYSIS: Mod: HCNC

## 2024-05-20 PROCEDURE — 80202 ASSAY OF VANCOMYCIN: CPT | Mod: HCNC | Performed by: HOSPITALIST

## 2024-05-20 PROCEDURE — 85730 THROMBOPLASTIN TIME PARTIAL: CPT | Mod: HCNC | Performed by: STUDENT IN AN ORGANIZED HEALTH CARE EDUCATION/TRAINING PROGRAM

## 2024-05-20 PROCEDURE — 85025 COMPLETE CBC W/AUTO DIFF WBC: CPT | Mod: HCNC | Performed by: STUDENT IN AN ORGANIZED HEALTH CARE EDUCATION/TRAINING PROGRAM

## 2024-05-20 PROCEDURE — 11000001 HC ACUTE MED/SURG PRIVATE ROOM: Mod: HCNC

## 2024-05-20 PROCEDURE — 25000003 PHARM REV CODE 250: Mod: HCNC | Performed by: HOSPITALIST

## 2024-05-20 PROCEDURE — 80053 COMPREHEN METABOLIC PANEL: CPT | Mod: HCNC | Performed by: HOSPITALIST

## 2024-05-20 PROCEDURE — 36415 COLL VENOUS BLD VENIPUNCTURE: CPT | Mod: HCNC | Performed by: HOSPITALIST

## 2024-05-20 RX ADMIN — LABETALOL HYDROCHLORIDE 100 MG: 100 TABLET, FILM COATED ORAL at 02:05

## 2024-05-20 RX ADMIN — FINASTERIDE 5 MG: 5 TABLET, FILM COATED ORAL at 08:05

## 2024-05-20 RX ADMIN — VANCOMYCIN HYDROCHLORIDE 500 MG: 500 INJECTION, POWDER, LYOPHILIZED, FOR SOLUTION INTRAVENOUS at 05:05

## 2024-05-20 RX ADMIN — ASPIRIN 81 MG: 81 TABLET, COATED ORAL at 08:05

## 2024-05-20 RX ADMIN — HEPARIN SODIUM 12 UNITS/KG/HR: 10000 INJECTION, SOLUTION INTRAVENOUS at 05:05

## 2024-05-20 RX ADMIN — EPOETIN ALFA-EPBX 10000 UNITS: 10000 INJECTION, SOLUTION INTRAVENOUS; SUBCUTANEOUS at 08:05

## 2024-05-20 RX ADMIN — DEXTROSE MONOHYDRATE 1 G: 2.5 INJECTION INTRAVENOUS at 12:05

## 2024-05-20 RX ADMIN — TAMSULOSIN HYDROCHLORIDE 0.8 MG: 0.4 CAPSULE ORAL at 08:05

## 2024-05-20 RX ADMIN — AMLODIPINE BESYLATE 2.5 MG: 2.5 TABLET ORAL at 02:05

## 2024-05-20 RX ADMIN — SEVELAMER CARBONATE 800 MG: 800 TABLET, FILM COATED ORAL at 04:05

## 2024-05-20 RX ADMIN — SEVELAMER CARBONATE 800 MG: 800 TABLET, FILM COATED ORAL at 08:05

## 2024-05-20 RX ADMIN — Medication 1 CAPSULE: at 08:05

## 2024-05-20 RX ADMIN — ATORVASTATIN CALCIUM 80 MG: 40 TABLET, FILM COATED ORAL at 08:05

## 2024-05-20 NOTE — ASSESSMENT & PLAN NOTE
I independently reviewed patient's lab work and images as documented. 67 yo male with ESRD on HD, DM and CVA with R sided weakness with recent admission for R ankle OM (maintained on vanc/ancef x 6w, ruben 5/20) admitted for unhealing R wound. ID consulted for abx recs. Hospital course notable for blood cx with CONS, suspected contaminant, repeat blcx ngtd. Suspect poor wound healing due to decreased blood flow, s/p R angio on 5/16. Repeat R US with significant stenoses post-angio.     Recommendations:  -continue vancomycin pharm to dose and ancef, ruben 5/20   -stop abx after completion of 6w course of therapy  -surgical dispo/intervention per ortho/vasc

## 2024-05-20 NOTE — PROGRESS NOTES
[facility] - Observation  Infectious Disease  Progress Note    Patient Name: Miguel Moore  MRN: [id number]  Admission Date: 5/10/2024  Length of Stay: 10 days  Attending Physician: Moises Coles III, MD  Primary Care Provider: Raciel Raymond MD    Isolation Status: No active isolations  Assessment/Plan:      ID  Subacute osteomyelitis of right foot  I independently reviewed patient's lab work and images as documented. 69 yo male with ESRD on HD, DM and CVA with R sided weakness with recent admission for R ankle OM (maintained on vanc/ancef x 6w, ruben 5/20) admitted for unhealing R wound. ID consulted for abx recs. Hospital course notable for blood cx with CONS, suspected contaminant, repeat blcx ngtd. Suspect poor wound healing due to decreased blood flow, s/p R angio on 5/16. Repeat R US with significant stenoses post-angio.     Recommendations:  -continue vancomycin pharm to dose and ancef, ruben 5/20   -stop abx after completion of 6w course of therapy  -surgical dispo/intervention per ortho/vasc          Thank you for your consult. Will sign off, please call with questions, new culture data or clinical changes. Above d/w primary team.       Laney Underwood MD  Infectious Disease  [facility] - Observation    Subjective:     Principal Problem:Open wound of right foot    HPI: 69 yo male with ESRD on HD, DM and CVA with R sided weakness with recent admission for R ankle OM (maintained on vanc/tara x 6w, ruben 5/20) admitted for unhealing R wound. ID consulted for abx recs. Hospital course notable for blood cx with CONS, suspected contaminant, repeat blcx ngtd. He is pending angio with vascular and evaluation by ortho for potential surgical disposition. Prior to admission, he denied fevers or chills.       Interval History: No fevers documented overnight.   Feeling well today. Seen in HD.     Review of Systems   Constitutional:  Negative for chills and fever.   All other systems reviewed and are  negative.    Objective:     Vital Signs (Most Recent):  Temp: 97.8 °F (36.6 °C) (05/20/24 0737)  Pulse: 62 (05/20/24 0737)  Resp: 18 (05/20/24 0737)  BP: 136/65 (05/20/24 0737)  SpO2: 99 % (05/20/24 0737) Vital Signs (24h Range):  Temp:  [97.7 °F (36.5 °C)-98.3 °F (36.8 °C)] 97.8 °F (36.6 °C)  Pulse:  [62-68] 62  Resp:  [16-20] 18  SpO2:  [97 %-100 %] 99 %  BP: (136-149)/(63-70) 136/65     Weight: 79.8 kg (175 lb 14.8 oz)  Body mass index is 26.75 kg/m².    Estimated Creatinine Clearance: 9.6 mL/min (A) (based on SCr of 7 mg/dL (H)).     Physical Exam  Constitutional:       Appearance: He is not ill-appearing.   Eyes:      General:         Right eye: No discharge.         Left eye: No discharge.   Pulmonary:      Effort: Pulmonary effort is normal. No respiratory distress.   Skin:     General: Skin is warm and dry.      Comments: Desmond feet wrapped   Neurological:      Mental Status: He is alert and oriented to person, place, and time.          Significant Labs:   Microbiology Results (last 7 days)       Procedure Component Value Units Date/Time    Blood culture [[id number]] Collected: 05/11/24 2006    Order Status: Completed Specimen: Blood from Peripheral, Hand, Right Updated: 05/15/24 2303     Blood Culture, Routine No Growth after 4 days.    Narrative:      could not draw second set cultures, due to patient being a hard   stick. reported to nurse Gwen    Blood culture [[id number]] Collected: 05/11/24 1335    Order Status: Completed Specimen: Blood Updated: 05/15/24 1703     Blood Culture, Routine No Growth after 4 days.    Aerobic culture (Specify Source) **CANNOT BE ORDERED AS STAT** [[id number]] Collected: 05/10/24 1703    Order Status: Completed Specimen: Abscess from Foot, Right Updated: 05/14/24 0723     Aerobic Bacterial Culture No growth            Significant Imaging: I have reviewed all pertinent imaging results/findings within the past 24 hours.

## 2024-05-20 NOTE — NURSING
Nurses Note -- 4 Eyes      5/20/2024   4:12 AM      Skin assessed during: Q Shift Change      [] No Altered Skin Integrity Present    []Prevention Measures Documented      [x] Yes LDA for Pressure Injury Previously documented     [] LDA Added if Not in Epic (Describe Wound)   [] New Altered Skin Integrity was Present on Admit and Documented in LDA   [] Wound Image Taken      Attending Nurse:  Gabby Kaminski RN/Staff Member:  Mike

## 2024-05-20 NOTE — PROGRESS NOTES
[facility]  Progress Note    Patient Name: Miguel Moore  MRN: [id number]  Patient Class: IP- Inpatient   Admission Date: 5/10/2024  Length of Stay: 10 days  Attending Physician: Moises Coles III, MD  Primary Care Provider: Raciel Raymond MD        Subjective:     Principal Problem:Open wound of right foot        HPI:  68 y.o. male with hypertension, BPH, chronic right lower extremity DVT, DM2, dyslipidemia, ESRD on HD, hemiplegia and hemiparesis following cerebral infarction affecting the right dominant side, paroxysmal AFib, seizure disorder as sequela of CVA sent to the ED from wound care for new wound infection.  Denies fever, chills, cough, shortness breath, chest pain, dizziness, syncope.  Was hospitalized in March for cellulitis and abscess to the right foot with surgical intervention at that time.  In the ED today, CRP and procalcitonin elevated.  Blood and wound cultures were obtained.  IV antibiotics initiated.    Overview/Hospital Course:  67yo M ESRD, PAD, R foot wounds s/p multiple surgeries admitted w worsening wound. Did have Staph epi in blood culture, likely contaminant. He does not want further debridement, wants BKA if needed. Started vancomycin and meropenem given his history of ESBL organisms. Ortho [facility] consulted. Vascular consulted- plan for RLE angiogram on 05/16 . Nephro following for HD. He also has L heel dark area, XR left heel with Osseous structures demonstrate no evidence for acute fracture or osseous destructive lesion.  Mild diffuse demineralization. wound care consulted.       Interval History: Pt states he is doing fine. No foot pain at this time. Plan for dialysis today. Ortho and vascular following for decision on amputation    Review of Systems  Objective:     Vital Signs (Most Recent):  Temp: 98 °F (36.7 °C) (05/20/24 1332)  Pulse: 67 (05/20/24 1332)  Resp: 19 (05/20/24 1332)  BP: 133/60 (05/20/24 1332)  SpO2: 100 % (05/20/24  "1332) Vital Signs (24h Range):  Temp:  [97.7 °F (36.5 °C)-98.3 °F (36.8 °C)] 98 °F (36.7 °C)  Pulse:  [62-79] 67  Resp:  [16-20] 19  SpO2:  [97 %-100 %] 100 %  BP: (104-149)/(37-70) 133/60     Weight: 79.8 kg (175 lb 14.8 oz)  Body mass index is 26.75 kg/m².    Intake/Output Summary (Last 24 hours) at 5/20/2024 1523  Last data filed at 5/20/2024 1305  Gross per 24 hour   Intake 968 ml   Output 3000 ml   Net -2032 ml         Physical Exam  Vitals reviewed.   Constitutional:       Appearance: He is ill-appearing (chronic).   HENT:      Mouth/Throat:      Mouth: Mucous membranes are moist.      Pharynx: Oropharynx is clear.   Cardiovascular:      Rate and Rhythm: Normal rate and regular rhythm.   Musculoskeletal:      Cervical back: Neck supple. No rigidity.   Skin:     General: Skin is warm and dry.   Neurological:      General: No focal deficit present.      Mental Status: He is alert and oriented to person, place, and time.             Significant Labs: All pertinent labs within the past 24 hours have been reviewed.    Significant Imaging: I have reviewed all pertinent imaging results/findings within the past 24 hours.    Assessment/Plan:      * Open wound of right foot  Per ID continue on vancomycin and cefazolin thru 5/20 (tomorrow)  S/p revascularization RLE as noted  BCX NGTD after initial BCX likely contaminant coag neg staph  Ortho following re: possible need for right BKA  multidisciplinary discussion of healing potential  Appears likely at this point that amputation will NOT be pursued  I note that recent xrays and CT right ankle March do NOT show any convincing evidence of osteomyelitis    -ortho plan for OR on 5/21      Atherosclerosis of native artery of right lower extremity with ulceration  As discussed under PAD    Subacute osteomyelitis of right foot  As per ID recs  Consider MRI of foot  Repeat ESR which was 88 May 10        Peripheral artery disease  Per Dr Lim 5/16  "successful right popliteal " "angioplasty with improved flow to RLE and foot. Will check post procedure flow with LOBO and toe pressures to evaluate healing potential. Continue wound care, offloading and optimization of comorbid conditions. Nutrition optimization. Will continue to follow"      Paroxysmal atrial fibrillation  CHADSVASC2 = 3  Eliquis has been on hold d/t peripheral intervention done 5/16, pt on heparin gtt        Anemia in chronic kidney disease  Stable  trend    Type 2 diabetes mellitus with diabetic neuropathy, with long-term current use of insulin  A1c 5.8%  SSI      Secondary hyperparathyroidism of renal origin  Continue sevelamer      ESRD (end stage renal disease)  HD MWF per renal      Hemiplegia and hemiparesis following cerebral infarction affecting right dominant side  No acute issues  Secondary preventive measures    BPH (benign prostatic hyperplasia) 2/18/21 prostate bx normal  Continue home regimen of tamsulosin and finasteride    Dyslipidemia  Statin was held d/t transaminitis  Resumed, trend LFT on atorva 80 mg    Benign essential HTN  Home regimen is unusual, labetalol 100 mg once daily  BP labile, HR in 60s  Added low dose amlodipine, BP improved a bit today  Pt has intolerance of ACEI (hyperkalemia)      VTE Risk Mitigation (From admission, onward)           Ordered     heparin 25,000 units in dextrose 5% (100 units/ml) IV bolus from bag HIGH INTENSITY nomogram - [facility]  As needed (PRN)        Question:  Heparin Infusion Adjustment (DO NOT MODIFY ANSWER)  Answer:  [email or web address]\Images\Pharmacy\HeparinInfusions\heparin HIGH INTENSITY nomogram for [facility] [id number]    05/14/24 1642     heparin 25,000 units in dextrose 5% (100 units/ml) IV bolus from bag HIGH INTENSITY nomogram - [facility]  As needed (PRN)        Question:  Heparin Infusion Adjustment (DO NOT MODIFY ANSWER)  Answer:  \[email or web address]\Images\Pharmacy\HeparinInfusions\heparin HIGH INTENSITY nomogram for [facility] [id number].[email or web address]    05/14/24 1642     heparin 25,000 " units in dextrose 5% 250 mL (100 units/mL) infusion HIGH INTENSITY nomogram - [facility]  Continuous        Question:  Begin at (units/kg/hr)  Answer:  18 05/14/24 1642     heparin 25,000 units in dextrose 5% 250 mL (100 units/mL) infusion HIGH INTENSITY nomogram - [facility]  Continuous        Question:  Begin at (units/kg/hr)  Answer:  18 05/13/24 1416     IP VTE HIGH RISK PATIENT  Once         05/10/24 1758     Place sequential compression device  Until discontinued         05/10/24 1758     Reason for No Pharmacological VTE Prophylaxis  Once        Question:  Reasons:  Answer:  Already adequately anticoagulated on oral Anticoagulants    05/10/24 1758                    Discharge Planning   GARY:      Code Status: Full Code   Is the patient medically ready for discharge?:     Reason for patient still in hospital (select all that apply): Consult recommendations  Discharge Plan A: Home with family   Discharge Delays: None known at this time              Moises Coles III, MD  Department of Hospital Medicine   [facility]

## 2024-05-20 NOTE — PLAN OF CARE
Recommendations  Recommendation:   1. Continue Renal/cardiac diet with Novasource Renal BID   2. Add diabetic diet restriction   3. Monitor weight and labs    Goals: Pt will consume 50-75% of meal intake    Nutrition Goal Status: new  Communication of RD Recs:  (POC)

## 2024-05-20 NOTE — SUBJECTIVE & OBJECTIVE
Interval History: No fevers documented overnight.   Feeling well today. Seen in HD.     Review of Systems   Constitutional:  Negative for chills and fever.   All other systems reviewed and are negative.    Objective:     Vital Signs (Most Recent):  Temp: 97.8 °F (36.6 °C) (05/20/24 0737)  Pulse: 62 (05/20/24 0737)  Resp: 18 (05/20/24 0737)  BP: 136/65 (05/20/24 0737)  SpO2: 99 % (05/20/24 0737) Vital Signs (24h Range):  Temp:  [97.7 °F (36.5 °C)-98.3 °F (36.8 °C)] 97.8 °F (36.6 °C)  Pulse:  [62-68] 62  Resp:  [16-20] 18  SpO2:  [97 %-100 %] 99 %  BP: (136-149)/(63-70) 136/65     Weight: 79.8 kg (175 lb 14.8 oz)  Body mass index is 26.75 kg/m².    Estimated Creatinine Clearance: 9.6 mL/min (A) (based on SCr of 7 mg/dL (H)).     Physical Exam  Constitutional:       Appearance: He is not ill-appearing.   Eyes:      General:         Right eye: No discharge.         Left eye: No discharge.   Pulmonary:      Effort: Pulmonary effort is normal. No respiratory distress.   Skin:     General: Skin is warm and dry.      Comments: Desmond feet wrapped   Neurological:      Mental Status: He is alert and oriented to person, place, and time.          Significant Labs:   Microbiology Results (last 7 days)       Procedure Component Value Units Date/Time    Blood culture [[id number]] Collected: 05/11/24 2006    Order Status: Completed Specimen: Blood from Peripheral, Hand, Right Updated: 05/15/24 2303     Blood Culture, Routine No Growth after 4 days.    Narrative:      could not draw second set cultures, due to patient being a hard   stick. reported to nurse Gwen    Blood culture [[id number]] Collected: 05/11/24 1335    Order Status: Completed Specimen: Blood Updated: 05/15/24 1703     Blood Culture, Routine No Growth after 4 days.    Aerobic culture (Specify Source) **CANNOT BE ORDERED AS STAT** [[id number]] Collected: 05/10/24 1703    Order Status: Completed Specimen: Abscess from Foot, Right Updated: 05/14/24 0723     Aerobic  Bacterial Culture No growth            Significant Imaging: I have reviewed all pertinent imaging results/findings within the past 24 hours.

## 2024-05-20 NOTE — PROGRESS NOTES
Date of Admission:5/10/2024    SUBJECTIVE: notes feeling ok on hd    Current Facility-Administered Medications   Medication    acetaminophen tablet 650 mg    aluminum-magnesium hydroxide-simethicone 200-200-20 mg/5 mL suspension 30 mL    amLODIPine tablet 2.5 mg    aspirin EC tablet 81 mg    atorvastatin tablet 80 mg    bisacodyL suppository 10 mg    ceFAZolin (ANCEF) 1 g in dextrose 5 % in water (D5W) 50 mL IVPB (MB+)    dextrose 10% bolus 125 mL 125 mL    dextrose 10% bolus 250 mL 250 mL    epoetin marisol-epbx injection 10,000 Units    finasteride tablet 5 mg    glucagon (human recombinant) injection 1 mg    glucose chewable tablet 16 g    glucose chewable tablet 24 g    heparin 25,000 units in dextrose 5% (100 units/ml) IV bolus from bag HIGH INTENSITY nomogram - [facility]    heparin 25,000 units in dextrose 5% (100 units/ml) IV bolus from bag HIGH INTENSITY nomogram - [facility]    heparin 25,000 units in dextrose 5% 250 mL (100 units/mL) infusion HIGH INTENSITY nomogram - [facility]    insulin aspart U-100 pen 0-10 Units    labetaloL tablet 100 mg    melatonin tablet 6 mg    naloxone 0.4 mg/mL injection 0.02 mg    ondansetron injection 4 mg    oxyCODONE-acetaminophen 5-325 mg per tablet 1 tablet    polyethylene glycol packet 17 g    prochlorperazine injection Soln 5 mg    sevelamer carbonate tablet 800 mg    simethicone chewable tablet 80 mg    sodium chloride 0.9% bolus 250 mL 250 mL    sodium chloride 0.9% flush 10 mL    tamsulosin 24 hr capsule 0.8 mg    vancomycin (VANCOCIN) 500 mg in dextrose 5 % in water (D5W) 100 mL IVPB (MB+)    vancomycin - pharmacy to dose    vitamin renal formula (B-complex-vitamin c-folic acid) 1 mg per capsule 1 capsule       Wt Readings from Last 3 Encounters:   05/17/24 79.8 kg (175 lb 14.8 oz)   04/02/24 90.4 kg (199 lb 4.7 oz)   03/07/24 79.8 kg (175 lb 14.8 oz)     Temp Readings from Last 3 Encounters:   05/20/24 97.8 °F (36.6 °C)   04/19/24 98.3 °F (36.8 °C) (Oral)   02/06/24 98 °F (36.7 °C)  (Oral)     BP Readings from Last 3 Encounters:   05/20/24 136/65   04/19/24 (!) 138/53   03/07/24 129/65     Pulse Readings from Last 3 Encounters:   05/20/24 62   04/19/24 79   02/06/24 80       Intake/Output Summary (Last 24 hours) at 5/20/2024 1149  Last data filed at 5/20/2024 0918  Gross per 24 hour   Intake 708 ml   Output --   Net 708 ml       PE:  GEN:wd male in nad  HEENT:ncat,eomi,mm  CVS:s1s2 regular  PULM:ctab  ABD:bs,soft  EXT:no leg edema, avf of arm  NEURO:awake    Recent Labs   Lab 05/20/24  0415   GLU 89   *   K 4.4   CL 97   CO2 23   BUN 34*   CREATININE 7.0*   CALCIUM 8.2*       Lab Results   Component Value Date     (H) 04/22/2024    CALCIUM 8.2 (L) 05/20/2024    PHOS 2.9 05/19/2024       Recent Labs   Lab 05/20/24  0415   WBC 3.97   RBC 3.60*   HGB 9.3*   HCT 29.1*      MCV 81*   MCH 25.8*   MCHC 32.0           A/P:  1.esrd. seen on hd. Cont tx MWF.  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 Epo cont.   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 Cont binders.  4.dm2. following sugars.  5.htn. cont bp meds.  6.foot infection. Cont post care.

## 2024-05-20 NOTE — PROGRESS NOTES
Pharmacokinetic Assessment Follow Up: IV Vancomycin    Vancomycin serum concentration assessment(s):    The random level was drawn correctly and can be used to guide therapy at this time. The measurement is within the desired definitive target range of 10 to 20 mcg/mL.    Vancomycin Regimen Plan:    Re-dose Vancomycin 500 mg after HD today  Obtain random level after next HD 5/22 at 0400    Drug levels (last 3 results):  Recent Labs   Lab Result Units 05/20/24  0415   Vancomycin, Random ug/mL 17.9       Pharmacy will continue to follow and monitor vancomycin.    Please contact pharmacy at extension [phone] for questions regarding this assessment.    Thank you for the consult,   Miguel Tong       Patient brief summary:  Miguel Moore is a 68 y.o. male initiated on antimicrobial therapy with IV Vancomycin for treatment of skin & soft tissue infection    Drug Allergies:   Review of patient's allergies indicates:   Allergen Reactions    Ace inhibitors      Hyperkalemia 8/2018  Other reaction(s): Unknown    Penicillins Hives     Tolerated cefepime and cefdinir previously    Tizanidine      Felt hot       Actual Body Weight:   79.8 kg    Renal Function:   Estimated Creatinine Clearance: 9.6 mL/min (A) (based on SCr of 7 mg/dL (H)).,     Dialysis Method (if applicable):  intermittent HD    CBC (last 72 hours):  Recent Labs   Lab Result Units 05/18/24  0553 05/19/24  0416 05/20/24  0415   WBC K/uL 4.28 4.16 3.97   Hemoglobin g/dL 9.4* 9.0* 9.3*   Hematocrit % 29.5* 27.9* 29.1*   Platelets K/uL 173 158 163   Gran % % 59.1 52.6 55.6   Lymph % % 25.5 29.8 27.5   Mono % % 11.0 11.5 10.8   Eosinophil % % 3.5 4.6 5.3   Basophil % % 0.7 1.0 0.5   Differential Method  Automated Automated Automated       Metabolic Panel (last 72 hours):  Recent Labs   Lab Result Units 05/19/24  0416 05/20/24  0415   Sodium mmol/L 132* 130*   Potassium mmol/L 4.4 4.4   Chloride mmol/L 99 97   CO2 mmol/L 24 23   Glucose mg/dL 86 89   BUN mg/dL  24* 34*   Creatinine mg/dL 5.8* 7.0*   Albumin g/dL 1.8* 1.9*   Total Bilirubin mg/dL  --  0.3   Alkaline Phosphatase U/L  --  63   AST U/L  --  12   ALT U/L  --  <5*   Phosphorus mg/dL 2.9  --        Vancomycin Administrations:  vancomycin given in the last 96 hours                     vancomycin (VANCOCIN) 500 mg in dextrose 5 % in water (D5W) 100 mL IVPB (MB+) (mg) 500 mg New Bag 05/17/24 1710                    Microbiologic Results:  Microbiology Results (last 7 days)       Procedure Component Value Units Date/Time    Blood culture [[id number]] Collected: 05/11/24 2006    Order Status: Completed Specimen: Blood from Peripheral, Hand, Right Updated: 05/15/24 2303     Blood Culture, Routine No Growth after 4 days.    Narrative:      could not draw second set cultures, due to patient being a hard   stick. reported to nurse Gwen    Blood culture [[id number]] Collected: 05/11/24 1335    Order Status: Completed Specimen: Blood Updated: 05/15/24 1703     Blood Culture, Routine No Growth after 4 days.    Aerobic culture (Specify Source) **CANNOT BE ORDERED AS STAT** [[id number]] Collected: 05/10/24 1703    Order Status: Completed Specimen: Abscess from Foot, Right Updated: 05/14/24 0723     Aerobic Bacterial Culture No growth    Blood culture #2 [[id number]]  (Abnormal) Collected: 05/10/24 1547    Order Status: Completed Specimen: Blood from Peripheral, Hand, Right Updated: 05/13/24 0948     Blood Culture, Routine Gram stain aer bottle: Gram positive cocci in clusters resembling Staph      Positive results previously called      COAGULASE-NEGATIVE STAPHYLOCOCCUS SPECIES  Organism is a probable contaminant      Narrative:      Blood Culture #2    Blood culture #1 [[id number]]  (Abnormal) Collected: 05/10/24 1545    Order Status: Completed Specimen: Blood from Peripheral, Antecubital, Right Updated: 05/13/24 0948     Blood Culture, Routine Gram stain aer bottle: Gram positive cocci in clusters resembling Staph       Results called to and read back by: Gwen Staley 05/11/2024  09:56      COAGULASE-NEGATIVE STAPHYLOCOCCUS SPECIES  Organism is a probable contaminant      Narrative:      Blood Culture #1

## 2024-05-20 NOTE — NURSING
[facility], [facility] - [place]  Nurses Note -- 4 Eyes      5/20/2024       Skin assessed on: Q Shift      [] No Pressure Injuries Present    []Prevention Measures Documented    [x] Yes LDA  for Pressure Injury Previously documented     [] Yes New Pressure Injury Discovered   [] LDA for New Pressure Injury Added      Attending RN:  Iman Tipton RN     Second RN:  Gabby Sanchez RN

## 2024-05-20 NOTE — PROGRESS NOTES
[facility] - [place] - Observation  Adult Nutrition  Progress Note    SUMMARY     Recommendations  Recommendation:   1. Continue Renal/cardiac diet with Novasource Renal BID   2. Add diabetic diet restriction   3. Monitor weight and labs    Goals: Pt will consume 50-75% of meal intake    Nutrition Goal Status: new  Communication of RD Recs:  (POC)    Assessment and Plan  Nutrition Problem  Increased protein needs     Related to (etiology):   ESRD     Signs and Symptoms (as evidenced by):   Pt on HD      Interventions:  Collaboration with other providers   Commercial beverage    Nutrition Diagnosis Status:   New    Malnutrition Assessment  Weight Loss (Malnutrition):  (Noted 24 lb weight loss in 1.5 months (-12% weight loss))     Reason for Assessment  Reason For Assessment: length of stay  Diagnosis: other (see comments) (Open wound of right foot)  Relevant Medical History: Stroke, HTN DM2, nuclear sclerosis, clotting disorder, DVT  Interdisciplinary Rounds: did not attend  General Information Comments: Pt is currently on a renal/cardiac diet. Noted wound dressing on 5/9/24. Aiden-1/right lower leg, left heel, right plantar foot, right plantar foot, right lateral heel, left anterior groin agiogram puncture. noted 100% meal intake. ESRD on HD. Noted hemiplegia following cerebral infarction affecting the right dominant side. Per chart, right foot sounds s/p multiple surgeries admitted with worsening wounds. Per chart, pt does not want further debridement, wants BKA if needed. 5/18- assumed care today, pt to decide with vascular and ortho whether to amputate RLE. Noted 24 lb weight loss in 1.5 months. Unable to conduct NFPE at this time d/t pt off unit at time of viisit.  Nutrition Discharge Planning: d/c on cardiac/diabetic/renal diet    Nutrition Risk Screen  Nutrition Risk Screen: no indicators present    Nutrition/Diet History  Patient Reported Diet/Restrictions/Preferences: diabetic diet, heart healthy, renal  Food  "Preferences: CHAITANYA  Factors Affecting Nutritional Intake: None identified at this time    Nutrition Related Social Determinants of Health: SDOH: Unable to assess at this time.     Anthropometrics  Temp: 98 °F (36.7 °C)  Height Method: Stated  Height: 5' 8" (172.7 cm)  Height (inches): 68 in  Weight Method: Bed Scale  Weight: 79.8 kg (175 lb 14.8 oz)  Weight (lb): 175.93 lb  Ideal Body Weight (IBW), Male: 154 lb  % Ideal Body Weight, Male (lb): 114.24 %  BMI (Calculated): 26.8  BMI Grade: 25 - 29.9 - overweight  Usual Body Weight (UBW), k.4 kg (24)  % Usual Body Weight: 88.46  % Weight Change From Usual Weight: -11.73 %     Lab/Procedures/Meds  Pertinent Labs Reviewed: reviewed  Pertinent Labs Comments: Na 130, BUN 34, Creatinine 7, Ca 8.2, GFR 8  Pertinent Medications Reviewed: reviewed  Pertinent Medications Comments: aspirin, atorvastatin, epoetin, finasteride, sevelamer, tamsulosin, renal vitamin    Estimated/Assessed Needs  Weight Used For Calorie Calculations: 79.8 kg (175 lb 14.8 oz)  Energy Calorie Requirements (kcal):  kcals (25 kcals/kg)  Energy Need Method: Kcal/kg  Protein Requirements: 111g (1.4g/kg)  Weight Used For Protein Calculations: 79.8 kg (175 lb 14.8 oz)     Estimated Fluid Requirement Method: RDA Method  RDA Method (mL):   CHO Requirement: 250g    Nutrition Prescription Ordered  Current Diet Order: renal cardiac  Oral Nutrition Supplement: Novasource Renal BID    Evaluation of Received Nutrient/Fluid Intake  I/O: 588/0  Energy Calories Required: meeting needs  Protein Required: meeting needs  Fluid Required: meeting needs  Comments: LBM-24  Tolerance: tolerating  % Intake of Estimated Energy Needs: 75 - 100 %  % Meal Intake: 75 - 100 %    Nutrition Risk  Level of Risk/Frequency of Follow-up:  (1x/weekly)     Monitor and Evaluation  Food and Nutrient Intake: food and beverage intake, energy intake  Food and Nutrient Adminstration: diet order  Anthropometric Measurements: " weight, weight change, body mass index  Biochemical Data, Medical Tests and Procedures: electrolyte and renal panel, gastrointestinal profile, inflammatory profile, lipid profile, glucose/endocrine profile     Nutrition Follow-Up  RD Follow-up?: Yes

## 2024-05-20 NOTE — PLAN OF CARE
Pt is pending consultation with vascular and ortho for possible below the knee amputation. Pt has completed his dialysis treatment on today.      05/20/24 1707   Discharge Reassessment   Assessment Type Discharge Planning Reassessment   Did the patient's condition or plan change since previous assessment? Yes   Discharge Plan discussed with: Patient   Communicated GARY with patient/caregiver Yes   Discharge Plan A Home with family   Discharge Plan B Other  (TBD)   DME Needed Upon Discharge  none   Transition of Care Barriers None   Why the patient remains in the hospital Requires continued medical care   Post-Acute Status   Coverage Managed Medicare - [employer]   Discharge Delays None known at this time

## 2024-05-20 NOTE — PROGRESS NOTES
Patient seen and examined at bedside this morning no acute events overnight reported.  Daughter at bedside      Physical exam: Unchanged physical exam    White blood cell within normal limits      Discussed extensively with patient and patient's daughter.  Patient is adamantly not interested in any I&D or amputation he is hopeful that revascularization will allow him to heal his wound Orthopaedics remains available if needed.      Please call with questions

## 2024-05-20 NOTE — PLAN OF CARE
Problem: Wound  Goal: Optimal Coping  Intervention: Support Patient and Family Response  Flowsheets (Taken 5/20/2024 0631)  Supportive Measures: active listening utilized  Goal: Optimal Functional Ability  Intervention: Optimize Functional Ability  Flowsheets (Taken 5/20/2024 0631)  Activity Management: Rolling - L1  Activity Assistance Provided: assistance, 1 person  Goal: Absence of Infection Signs and Symptoms  Intervention: Prevent or Manage Infection  Flowsheets (Taken 5/20/2024 0631)  Infection Management: aseptic technique maintained  Goal: Skin Health and Integrity  Intervention: Optimize Skin Protection  Flowsheets (Taken 5/20/2024 0631)  Pressure Reduction Techniques: frequent weight shift encouraged  Activity Management: Rolling - L1  Head of Bed (HOB) Positioning: HOB elevated  Goal: Optimal Wound Healing  Intervention: Promote Wound Healing  Flowsheets (Taken 5/20/2024 0631)  Sleep/Rest Enhancement: awakenings minimized     Problem: Skin Injury Risk Increased  Goal: Skin Health and Integrity  Intervention: Optimize Skin Protection  Flowsheets (Taken 5/20/2024 0631)  Pressure Reduction Techniques: frequent weight shift encouraged  Activity Management: Rolling - L1  Head of Bed (HOB) Positioning: HOB elevated

## 2024-05-20 NOTE — NURSING
[facility]  Rapid Response Nurse Intervention/ Task    Date of Visit: 05/20/2024  Time of Visit: 1730       INTERVENTIONS/ TASK Completed:     IV Placement

## 2024-05-20 NOTE — ASSESSMENT & PLAN NOTE
Per ID continue on vancomycin and cefazolin thru 5/20 (tomorrow)  S/p revascularization RLE as noted  BCX NGTD after initial BCX likely contaminant coag neg staph  Ortho following re: possible need for right BKA  multidisciplinary discussion of healing potential  Appears likely at this point that amputation will NOT be pursued  I note that recent xrays and CT right ankle March do NOT show any convincing evidence of osteomyelitis    -ortho plan for OR on 5/21

## 2024-05-20 NOTE — NURSING
Patient wasn't able to get LOBO when transportation had arrived because patient was soiled and needed cleaning and grown changed.   Nurse requested  transport to come back for patient.  Nurse called for transport but was told staff that performed  LOBO was gone for today and patient LOBO will be done in am.

## 2024-05-21 ENCOUNTER — ANESTHESIA EVENT (OUTPATIENT)
Dept: SURGERY | Facility: HOSPITAL | Age: 69
DRG: 252 | End: 2024-05-21
Payer: MEDICARE

## 2024-05-21 ENCOUNTER — EXTERNAL HOME HEALTH (OUTPATIENT)
Dept: HOME HEALTH SERVICES | Facility: HOSPITAL | Age: 69
End: 2024-05-21
Payer: MEDICARE

## 2024-05-21 ENCOUNTER — ANESTHESIA (OUTPATIENT)
Dept: SURGERY | Facility: HOSPITAL | Age: 69
DRG: 252 | End: 2024-05-21
Payer: MEDICARE

## 2024-05-21 LAB
APTT PPP: 43.6 SEC (ref 21–32)
BASOPHILS # BLD AUTO: 0.02 K/UL (ref 0–0.2)
BASOPHILS NFR BLD: 0.5 % (ref 0–1.9)
DIFFERENTIAL METHOD BLD: ABNORMAL
EOSINOPHIL # BLD AUTO: 0.2 K/UL (ref 0–0.5)
EOSINOPHIL NFR BLD: 5.1 % (ref 0–8)
ERYTHROCYTE [DISTWIDTH] IN BLOOD BY AUTOMATED COUNT: 18.5 % (ref 11.5–14.5)
HCT VFR BLD AUTO: 29.7 % (ref 40–54)
HGB BLD-MCNC: 9.1 G/DL (ref 14–18)
IMM GRANULOCYTES # BLD AUTO: 0.02 K/UL (ref 0–0.04)
IMM GRANULOCYTES NFR BLD AUTO: 0.5 % (ref 0–0.5)
LEFT ABI: 1.01
LEFT DORSALIS PEDIS: 103 MMHG
LEFT POSTERIOR TIBIAL: 144 MMHG
LEFT TBI: 0.3
LEFT TOE PRESSURE: 43 MMHG
LYMPHOCYTES # BLD AUTO: 1 K/UL (ref 1–4.8)
LYMPHOCYTES NFR BLD: 24 % (ref 18–48)
MCH RBC QN AUTO: 26 PG (ref 27–31)
MCHC RBC AUTO-ENTMCNC: 30.6 G/DL (ref 32–36)
MCV RBC AUTO: 85 FL (ref 82–98)
MONOCYTES # BLD AUTO: 0.5 K/UL (ref 0.3–1)
MONOCYTES NFR BLD: 11.4 % (ref 4–15)
NEUTROPHILS # BLD AUTO: 2.4 K/UL (ref 1.8–7.7)
NEUTROPHILS NFR BLD: 58.5 % (ref 38–73)
NRBC BLD-RTO: 0 /100 WBC
PLATELET # BLD AUTO: 153 K/UL (ref 150–450)
PMV BLD AUTO: 9.1 FL (ref 9.2–12.9)
POCT GLUCOSE: 101 MG/DL (ref 70–110)
POCT GLUCOSE: 105 MG/DL (ref 70–110)
POCT GLUCOSE: 123 MG/DL (ref 70–110)
RBC # BLD AUTO: 3.5 M/UL (ref 4.6–6.2)
RIGHT ABI: 1.8
RIGHT ARM BP: 142 MMHG
RIGHT DORSALIS PEDIS: 255 MMHG
RIGHT POSTERIOR TIBIAL: 255 MMHG
RIGHT TBI: 0.37
RIGHT TOE PRESSURE: 53 MMHG
WBC # BLD AUTO: 4.12 K/UL (ref 3.9–12.7)

## 2024-05-21 PROCEDURE — 25000003 PHARM REV CODE 250: Mod: HCNC | Performed by: STUDENT IN AN ORGANIZED HEALTH CARE EDUCATION/TRAINING PROGRAM

## 2024-05-21 PROCEDURE — 27201423 OPTIME MED/SURG SUP & DEVICES STERILE SUPPLY: Mod: HCNC | Performed by: ORTHOPAEDIC SURGERY

## 2024-05-21 PROCEDURE — 25000003 PHARM REV CODE 250: Mod: HCNC | Performed by: HOSPITALIST

## 2024-05-21 PROCEDURE — 87075 CULTR BACTERIA EXCEPT BLOOD: CPT | Mod: HCNC | Performed by: ORTHOPAEDIC SURGERY

## 2024-05-21 PROCEDURE — 63600175 PHARM REV CODE 636 W HCPCS: Mod: HCNC | Performed by: STUDENT IN AN ORGANIZED HEALTH CARE EDUCATION/TRAINING PROGRAM

## 2024-05-21 PROCEDURE — 37000008 HC ANESTHESIA 1ST 15 MINUTES: Mod: HCNC | Performed by: ORTHOPAEDIC SURGERY

## 2024-05-21 PROCEDURE — 87116 MYCOBACTERIA CULTURE: CPT | Mod: HCNC | Performed by: ORTHOPAEDIC SURGERY

## 2024-05-21 PROCEDURE — 87205 SMEAR GRAM STAIN: CPT | Mod: HCNC | Performed by: ORTHOPAEDIC SURGERY

## 2024-05-21 PROCEDURE — 63600175 PHARM REV CODE 636 W HCPCS: Mod: HCNC | Performed by: NURSE ANESTHETIST, CERTIFIED REGISTERED

## 2024-05-21 PROCEDURE — 85730 THROMBOPLASTIN TIME PARTIAL: CPT | Mod: HCNC | Performed by: STUDENT IN AN ORGANIZED HEALTH CARE EDUCATION/TRAINING PROGRAM

## 2024-05-21 PROCEDURE — 87102 FUNGUS ISOLATION CULTURE: CPT | Mod: HCNC | Performed by: ORTHOPAEDIC SURGERY

## 2024-05-21 PROCEDURE — 88305 TISSUE EXAM BY PATHOLOGIST: CPT | Mod: HCNC | Performed by: PATHOLOGY

## 2024-05-21 PROCEDURE — 36000704 HC OR TIME LEV I 1ST 15 MIN: Mod: HCNC | Performed by: ORTHOPAEDIC SURGERY

## 2024-05-21 PROCEDURE — 87070 CULTURE OTHR SPECIMN AEROBIC: CPT | Mod: HCNC | Performed by: ORTHOPAEDIC SURGERY

## 2024-05-21 PROCEDURE — 94761 N-INVAS EAR/PLS OXIMETRY MLT: CPT | Mod: HCNC

## 2024-05-21 PROCEDURE — 36415 COLL VENOUS BLD VENIPUNCTURE: CPT | Mod: HCNC | Performed by: STUDENT IN AN ORGANIZED HEALTH CARE EDUCATION/TRAINING PROGRAM

## 2024-05-21 PROCEDURE — 37000009 HC ANESTHESIA EA ADD 15 MINS: Mod: HCNC | Performed by: ORTHOPAEDIC SURGERY

## 2024-05-21 PROCEDURE — 0KBV0ZZ EXCISION OF RIGHT FOOT MUSCLE, OPEN APPROACH: ICD-10-PCS | Performed by: ORTHOPAEDIC SURGERY

## 2024-05-21 PROCEDURE — 25000003 PHARM REV CODE 250: Mod: HCNC | Performed by: NURSE ANESTHETIST, CERTIFIED REGISTERED

## 2024-05-21 PROCEDURE — 85025 COMPLETE CBC W/AUTO DIFF WBC: CPT | Mod: HCNC | Performed by: STUDENT IN AN ORGANIZED HEALTH CARE EDUCATION/TRAINING PROGRAM

## 2024-05-21 PROCEDURE — 11000001 HC ACUTE MED/SURG PRIVATE ROOM: Mod: HCNC

## 2024-05-21 PROCEDURE — 88305 TISSUE EXAM BY PATHOLOGIST: CPT | Mod: 26,HCNC,, | Performed by: PATHOLOGY

## 2024-05-21 PROCEDURE — 63600175 PHARM REV CODE 636 W HCPCS: Mod: HCNC | Performed by: ORTHOPAEDIC SURGERY

## 2024-05-21 PROCEDURE — 36000705 HC OR TIME LEV I EA ADD 15 MIN: Mod: HCNC | Performed by: ORTHOPAEDIC SURGERY

## 2024-05-21 PROCEDURE — 87206 SMEAR FLUORESCENT/ACID STAI: CPT | Mod: HCNC | Performed by: ORTHOPAEDIC SURGERY

## 2024-05-21 PROCEDURE — 71000033 HC RECOVERY, INTIAL HOUR: Mod: HCNC | Performed by: ORTHOPAEDIC SURGERY

## 2024-05-21 RX ORDER — HYDROMORPHONE HYDROCHLORIDE 2 MG/ML
0.2 INJECTION, SOLUTION INTRAMUSCULAR; INTRAVENOUS; SUBCUTANEOUS EVERY 5 MIN PRN
Status: DISCONTINUED | OUTPATIENT
Start: 2024-05-21 | End: 2024-05-22

## 2024-05-21 RX ORDER — PROPOFOL 10 MG/ML
VIAL (ML) INTRAVENOUS CONTINUOUS PRN
Status: DISCONTINUED | OUTPATIENT
Start: 2024-05-21 | End: 2024-05-21

## 2024-05-21 RX ORDER — LIDOCAINE HYDROCHLORIDE 20 MG/ML
INJECTION INTRAVENOUS
Status: DISCONTINUED | OUTPATIENT
Start: 2024-05-21 | End: 2024-05-21

## 2024-05-21 RX ORDER — ONDANSETRON HYDROCHLORIDE 2 MG/ML
4 INJECTION, SOLUTION INTRAVENOUS DAILY PRN
Status: DISCONTINUED | OUTPATIENT
Start: 2024-05-21 | End: 2024-05-22

## 2024-05-21 RX ORDER — ONDANSETRON HYDROCHLORIDE 2 MG/ML
INJECTION, SOLUTION INTRAVENOUS
Status: DISCONTINUED | OUTPATIENT
Start: 2024-05-21 | End: 2024-05-21

## 2024-05-21 RX ORDER — FENTANYL CITRATE 50 UG/ML
INJECTION, SOLUTION INTRAMUSCULAR; INTRAVENOUS
Status: DISCONTINUED | OUTPATIENT
Start: 2024-05-21 | End: 2024-05-21

## 2024-05-21 RX ORDER — SODIUM CHLORIDE 0.9 % (FLUSH) 0.9 %
10 SYRINGE (ML) INJECTION
Status: DISCONTINUED | OUTPATIENT
Start: 2024-05-21 | End: 2024-05-22

## 2024-05-21 RX ADMIN — FENTANYL CITRATE 25 MCG: 0.05 INJECTION, SOLUTION INTRAMUSCULAR; INTRAVENOUS at 04:05

## 2024-05-21 RX ADMIN — SEVELAMER CARBONATE 800 MG: 800 TABLET, FILM COATED ORAL at 06:05

## 2024-05-21 RX ADMIN — SODIUM CHLORIDE: 0.9 INJECTION, SOLUTION INTRAVENOUS at 04:05

## 2024-05-21 RX ADMIN — TAMSULOSIN HYDROCHLORIDE 0.8 MG: 0.4 CAPSULE ORAL at 08:05

## 2024-05-21 RX ADMIN — Medication 1 CAPSULE: at 08:05

## 2024-05-21 RX ADMIN — ONDANSETRON 4 MG: 2 INJECTION, SOLUTION INTRAMUSCULAR; INTRAVENOUS at 04:05

## 2024-05-21 RX ADMIN — ASPIRIN 81 MG: 81 TABLET, COATED ORAL at 08:05

## 2024-05-21 RX ADMIN — LIDOCAINE HYDROCHLORIDE 60 MG: 20 INJECTION, SOLUTION INTRAVENOUS at 04:05

## 2024-05-21 RX ADMIN — FINASTERIDE 5 MG: 5 TABLET, FILM COATED ORAL at 08:05

## 2024-05-21 RX ADMIN — PROPOFOL 50 MCG/KG/MIN: 10 INJECTION, EMULSION INTRAVENOUS at 04:05

## 2024-05-21 RX ADMIN — FENTANYL CITRATE 50 MCG: 0.05 INJECTION, SOLUTION INTRAMUSCULAR; INTRAVENOUS at 04:05

## 2024-05-21 RX ADMIN — AMLODIPINE BESYLATE 2.5 MG: 2.5 TABLET ORAL at 08:05

## 2024-05-21 RX ADMIN — DEXTROSE MONOHYDRATE 1 G: 2.5 INJECTION INTRAVENOUS at 08:05

## 2024-05-21 RX ADMIN — HEPARIN SODIUM 12 UNITS/KG/HR: 10000 INJECTION, SOLUTION INTRAVENOUS at 06:05

## 2024-05-21 RX ADMIN — LABETALOL HYDROCHLORIDE 100 MG: 100 TABLET, FILM COATED ORAL at 08:05

## 2024-05-21 RX ADMIN — ATORVASTATIN CALCIUM 80 MG: 40 TABLET, FILM COATED ORAL at 08:05

## 2024-05-21 NOTE — SUBJECTIVE & OBJECTIVE
Interval History: Pt states he is doing fine ready for procedure. Denies fever or chills.     Review of Systems  Objective:     Vital Signs (Most Recent):  Temp: 98.7 °F (37.1 °C) (05/21/24 1118)  Pulse: 66 (05/21/24 1118)  Resp: 18 (05/21/24 0733)  BP: (!) 140/65 (05/21/24 1118)  SpO2: 100 % (05/21/24 1118) Vital Signs (24h Range):  Temp:  [97.5 °F (36.4 °C)-98.7 °F (37.1 °C)] 98.7 °F (37.1 °C)  Pulse:  [65-74] 66  Resp:  [18-19] 18  SpO2:  [97 %-100 %] 100 %  BP: (128-172)/(60-80) 140/65     Weight: 79.8 kg (175 lb 14.8 oz)  Body mass index is 26.75 kg/m².    Intake/Output Summary (Last 24 hours) at 5/21/2024 1347  Last data filed at 5/20/2024 1738  Gross per 24 hour   Intake 240 ml   Output --   Net 240 ml         Physical Exam    Vitals reviewed.   Constitutional:       Appearance: He is ill-appearing (chronic).   HENT:      Mouth/Throat:      Mouth: Mucous membranes are moist.      Pharynx: Oropharynx is clear.   Cardiovascular:      Rate and Rhythm: Normal rate and regular rhythm.   Musculoskeletal:      Cervical back: Neck supple. No rigidity.   Skin:     General: Skin is warm and dry.   Neurological:      General: No focal deficit present.      Mental Status: He is alert and oriented to person, place, and time.    Significant Labs: All pertinent labs within the past 24 hours have been reviewed.    Significant Imaging: I have reviewed all pertinent imaging results/findings within the past 24 hours.

## 2024-05-21 NOTE — NURSING
[facility], [facility]  Nurses Note -- 4 Eyes      5/20/2024       Skin assessed on: Q Shift      [] No Pressure Injuries Present    []Prevention Measures Documented    [x] Yes LDA  for Pressure Injury Previously documented     [] Yes New Pressure Injury Discovered   [] LDA for New Pressure Injury Added      Attending RN:  Diane Dowling LPN     Second RN:  Iman Tipton RN

## 2024-05-21 NOTE — PLAN OF CARE
Problem: Infection  Goal: Absence of Infection Signs and Symptoms  Outcome: Progressing     Problem: Wound  Goal: Optimal Coping  Outcome: Progressing  Goal: Optimal Functional Ability  Outcome: Progressing  Goal: Absence of Infection Signs and Symptoms  Outcome: Progressing  Goal: Improved Oral Intake  Outcome: Progressing  Goal: Skin Health and Integrity  Outcome: Progressing  Goal: Optimal Wound Healing  Outcome: Progressing

## 2024-05-21 NOTE — PLAN OF CARE
Problem: Adult Inpatient Plan of Care  Goal: Plan of Care Review  Outcome: Progressing  Goal: Patient-Specific Goal (Individualized)  Outcome: Progressing  Goal: Absence of Hospital-Acquired Illness or Injury  Outcome: Progressing  Goal: Optimal Comfort and Wellbeing  Outcome: Progressing  Goal: Readiness for Transition of Care  Outcome: Progressing     Problem: Infection  Goal: Absence of Infection Signs and Symptoms  Outcome: Progressing     Problem: Diabetes Comorbidity  Goal: Blood Glucose Level Within Targeted Range  Outcome: Progressing     Problem: Wound  Goal: Optimal Coping  Outcome: Progressing  Goal: Optimal Functional Ability  Outcome: Progressing  Goal: Absence of Infection Signs and Symptoms  Outcome: Progressing  Goal: Improved Oral Intake  Outcome: Progressing  Goal: Skin Health and Integrity  Outcome: Progressing  Goal: Optimal Wound Healing  Outcome: Progressing     Problem: Skin Injury Risk Increased  Goal: Skin Health and Integrity  Outcome: Progressing     Problem: Hemodialysis  Goal: Safe, Effective Therapy Delivery  Outcome: Progressing  Goal: Effective Tissue Perfusion  Outcome: Progressing  Goal: Absence of Infection Signs and Symptoms  Outcome: Progressing     Problem: Fall Injury Risk  Goal: Absence of Fall and Fall-Related Injury  Outcome: Progressing

## 2024-05-21 NOTE — PT/OT/SLP PROGRESS
Occupational Therapy      Patient Name:  Miguel Moore   MRN:  [id number]    Patient not seen today secondary to Off the floor for procedure/surgery. Will follow-up tomorrow.    5/21/2024

## 2024-05-21 NOTE — ANESTHESIA PREPROCEDURE EVALUATION
05/21/2024    Pre-operative evaluation for Procedure(s) (LRB):  INCISION AND DRAINAGE, LOWER EXTREMITY (Right)    Miguel Moore is a 68 y.o. male     Patient Active Problem List   Diagnosis    Benign essential HTN    Dyslipidemia    BPH (benign prostatic hyperplasia) 2/18/21 prostate bx normal    Seizure disorder as sequela of cerebrovascular accident    Hemiplegia and hemiparesis following cerebral infarction affecting right dominant side    Microcytosis 9/2019 labs c/w AoCD and AoCKD    ESRD (end stage renal disease)    Nuclear sclerosis, left    Cortical cataract of both eyes    DM type 2 without retinopathy    Secondary hyperparathyroidism of renal origin    Vitamin D deficiency    Right sided weakness    Senile nuclear sclerosis    CME (cystoid macular edema), bilateral    Refractive error    History of stroke    Type 2 diabetes mellitus with diabetic neuropathy, with long-term current use of insulin    History of fungal infection candida parasilosis hospitalization 8/2020    Chronic deep vein thrombosis (DVT) of popliteal vein of right lower extremity 9/21/20 outside [place]; unprovoked; anticoagulation    Pulmonary nodule 1/2021 4 mm nodule.    Elevated PSA 2/18/21 prostate bx normal    Anemia in chronic kidney disease    History of diabetic ulcer of foot    Pseudophakia    Bilateral posterior capsular opacification    Paroxysmal atrial fibrillation    Open wound    Peripheral artery disease    Abdominal aortic atherosclerosis on CT 4/1/24    Open wound of right foot    Subacute osteomyelitis of right foot    Atherosclerosis of native artery of right lower extremity with ulceration       Review of patient's allergies indicates:   Allergen Reactions    Ace inhibitors      Hyperkalemia 8/2018  Other reaction(s): Unknown    Penicillins Hives     Tolerated cefepime and cefdinir previously    Tizanidine       Felt hot       No current facility-administered medications on file prior to encounter.     Current Outpatient Medications on File Prior to Encounter   Medication Sig Dispense Refill    apixaban (ELIQUIS) 5 mg Tab Take 1 tablet (5 mg total) by mouth 2 (two) times daily. 180 tablet 3    aspirin (ECOTRIN) 81 MG EC tablet Take 1 tablet (81 mg total) by mouth once daily. 90 tablet 3    ferrous gluconate (FERGON) 324 MG tablet Take 1 tablet (324 mg total) by mouth 2 (two) times daily with meals. 60 tablet 11    finasteride (PROSCAR) 5 mg tablet Take 1 tablet (5 mg total) by mouth once daily. 90 tablet 3    iron sucrose in NS (VENOFER) 100 mg/100 mL PgBk 50 mg.      labetaloL (NORMODYNE) 100 MG tablet Take 1 tablet (100 mg total) by mouth once daily. 90 tablet 3    methoxy peg-epoetin beta (MIRCERA INJ) 75 mcg.      mupirocin (BACTROBAN) 2 % ointment Apply to affected area 3 times daily 22 g 1    oxyCODONE-acetaminophen (PERCOCET)  mg per tablet Take 1 tablet by mouth every 6 (six) hours as needed for Pain. 10 tablet 0    RENVELA 800 mg Tab Take 1 tablet (800 mg total) by mouth 3 (three) times daily with meals. 90 tablet 0    tamsulosin (FLOMAX) 0.4 mg Cap Take 2 capsules (0.8 mg total) by mouth once daily. 180 capsule 3    vitamin renal formula, B-complex-vitamin c-folic acid, (NEPHROCAP) 1 mg Cap Take 1 capsule by mouth once daily. 30 capsule 11       Past Surgical History:   Procedure Laterality Date    CATARACT EXTRACTION W/  INTRAOCULAR LENS IMPLANT Right 10/05/2017    Dr. Tay    CATARACT EXTRACTION W/  INTRAOCULAR LENS IMPLANT Left 10/19/2017    Dr. Tay    CYSTOSCOPY W/ RETROGRADES Bilateral 2/18/2021    Procedure: CYSTOSCOPY, WITH RETROGRADE PYELOGRAM;  Surgeon: JEMMA Styles MD;  Location: [facility];  Service: Urology;  Laterality: Bilateral;  REQUESTING EARLY AS POSSIBE-LO / 2/8/2021 @ 1:13PM  RN Pre Op 2-11-21, Covid NEGATIVE ON  2-17-21.  C A    ESOPHAGOGASTRODUODENOSCOPY N/A 8/17/2020     Procedure: EGD (ESOPHAGOGASTRODUODENOSCOPY);  Surgeon: Desmond Chapman MD;  Location: [facility] ENDO;  Service: Endoscopy;  Laterality: N/A;    EYE SURGERY Bilateral     cataract    FEMORAL ARTERY STENT      FRACTURE SURGERY      INCISION AND DRAINAGE, LOWER EXTREMITY Right 4/4/2024    Procedure: INCISION AND DRAINAGE, LOWER EXTREMITY;  Surgeon: Jimbo Montilla III, MD;  Location: [facility] OR;  Service: Orthopedics;  Laterality: Right;    INCISION AND DRAINAGE, LOWER EXTREMITY Right 4/6/2024    Procedure: INCISION AND DRAINAGE, LOWER EXTREMITY;  Surgeon: Desmond Barillas MD;  Location: [facility] OR;  Service: Orthopedics;  Laterality: Right;  foot and ankle    INCISION AND DRAINAGE, LOWER EXTREMITY Right 4/9/2024    Procedure: INCISION AND DRAINAGE, LOWER EXTREMITY- FOOT & ANKLE;  Surgeon: Jimbo Montilla III, MD;  Location: [facility] OR;  Service: Orthopedics;  Laterality: Right;  CULTURES, VASCHE    KNEE SURGERY      bilateral scope    WOUND DRESSING Right 4/9/2024    Procedure: WOUND VAC EXCHANGE;  Surgeon: Jimbo Montilla III, MD;  Location: [facility] OR;  Service: Orthopedics;  Laterality: Right;       VITALS    CBC:   Recent Labs     05/20/24 0415 05/21/24  0415   WBC 3.97 4.12   RBC 3.60* 3.50*   HGB 9.3* 9.1*   HCT 29.1* 29.7*    153   MCV 81* 85   MCH 25.8* 26.0*   MCHC 32.0 30.6*       CMP:   Recent Labs     05/19/24 0416 05/20/24  0415   * 130*   K 4.4 4.4   CL 99 97   CO2 24 23   BUN 24* 34*   CREATININE 5.8* 7.0*   GLU 86 89   PHOS 2.9  --    CALCIUM 7.9* 8.2*   ALBUMIN 1.8* 1.9*   PROT  --  5.3*   ALKPHOS  --  63   ALT  --  <5*   AST  --  12   BILITOT  --  0.3       INR  Recent Labs     05/19/24  2116 05/20/24  0415 05/21/24  0415   APTT 47.6* 46.1* 43.6*           Pre-op Assessment    I have reviewed the Patient Summary Reports.     I have reviewed the Nursing Notes.       Review of Systems  Anesthesia Hx:  No problems with previous Anesthesia             Denies Family Hx of Anesthesia complications.     Denies Personal Hx of Anesthesia complications.                    Social:  Non-Smoker, No Alcohol Use       Hematology/Oncology:    Oncology Normal    -- Anemia:                                  EENT/Dental:  EENT/Dental Normal           Cardiovascular:  Exercise tolerance: good   Hypertension           hyperlipidemia    05/12/2024 Echo:  Left Ventricle: The left ventricle is normal in size. There is mild concentric hypertrophy. There is normal systolic function with a visually estimated ejection fraction of 55 - 60%. Grade I diastolic dysfunction.  ·  Right Ventricle: Normal right ventricular cavity size. Systolic function is normal.  ·  Left Atrium: Left atrium is mildly dilated.  ·  Aortic Valve: There is mild aortic valve sclerosis.  ·  Pulmonary Artery: The estimated pulmonary artery systolic pressure is 21 mmHg.  ·  IVC/SVC: Normal venous pressure at 3 mmHg.                     Hypertension     Atrial Fibrillation     Pulmonary:  Pulmonary Normal                       Renal/:  Chronic Renal Disease (MWF), ESRD, Dialysis        Kidney Function/Disease             Hepatic/GI:  Hepatic/GI Normal                 Neurological:   CVA, residual symptoms    Seizures           Seizure Disorder             CVA - Cerebrovasular Accident                 Endocrine:  Diabetes    Diabetes                          Physical Exam  General: Well nourished, Cooperative, Alert and Oriented    Airway:  Mallampati: II   Mouth Opening: Normal  TM Distance: 4 - 6 cm  Tongue: Normal    Dental:  Only 4 teeth present  Chest/Lungs:  Normal Respiratory Rate    Heart:  Rate: Normal      Wt Readings from Last 3 Encounters:   05/20/24 79.8 kg (175 lb 14.8 oz)   04/02/24 90.4 kg (199 lb 4.7 oz)   03/07/24 79.8 kg (175 lb 14.8 oz)     Temp Readings from Last 3 Encounters:   05/21/24 37.1 °C (98.7 °F) (Oral)   04/19/24 36.8 °C (98.3 °F) (Oral)   02/06/24 36.7 °C (98 °F) (Oral)     BP Readings from Last 3 Encounters:   05/21/24 (!) 140/65    04/19/24 (!) 138/53   03/07/24 129/65     Pulse Readings from Last 3 Encounters:   05/21/24 66   04/19/24 79   02/06/24 80         Anesthesia Plan  Type of Anesthesia, risks & benefits discussed:    Anesthesia Type: MAC, Gen Natural Airway, Gen ETT, Gen Supraglottic Airway  Intra-op Monitoring Plan: Standard ASA Monitors  Post Op Pain Control Plan: multimodal analgesia and IV/PO Opioids PRN  Induction:  IV  Informed Consent: Informed consent signed with the Patient and all parties understand the risks and agree with anesthesia plan.  All questions answered.   ASA Score: 3  Day of Surgery Review of History & Physical: H&P Update referred to the surgeon/provider.    Ready For Surgery From Anesthesia Perspective.     .

## 2024-05-21 NOTE — PT/OT/SLP PROGRESS
Occupational Therapy      Patient Name:  Miguel Moore   MRN:  [id number]    Patient not seen today secondary to Off the floor for procedure/surgery. Will follow-up as able.    5/21/2024

## 2024-05-21 NOTE — PROGRESS NOTES
Vancomycin consult follow-up:    Patient reviewed, dialysis scheduled every Mon, Wed, Fri, no new levels, continue current therapy; Next levels due: random due 5/22/2024 at 0400

## 2024-05-21 NOTE — PROGRESS NOTES
The patient is seen in dialysis.  He reports no complaints.          Temp:  [97.5 °F (36.4 °C)-98 °F (36.7 °C)] 97.5 °F (36.4 °C)  Pulse:  [62-79] 71  Resp:  [16-20] 18  SpO2:  [97 %-100 %] 100 %  BP: (104-147)/(37-70) 147/67    Physical examination:    Right foot is dressed.      Recent Labs   Lab 05/20/24  0415   WBC 3.97   RBC 3.60*   HGB 9.3*   HCT 29.1*      MCV 81*   MCH 25.8*   MCHC 32.0         Current Facility-Administered Medications:     acetaminophen tablet 650 mg, 650 mg, Oral, Q6H PRN, Ajay Baxter Jr., NP    aluminum-magnesium hydroxide-simethicone 200-200-20 mg/5 mL suspension 30 mL, 30 mL, Oral, QID PRN, Ajay Baxter Jr., SHAYLA    amLODIPine tablet 2.5 mg, 2.5 mg, Oral, Daily, Kareem Cardenas MD, 2.5 mg at 05/20/24 1400    aspirin EC tablet 81 mg, 81 mg, Oral, Daily, Ajay Baxter Jr., NP, 81 mg at 05/20/24 0817    atorvastatin tablet 80 mg, 80 mg, Oral, Daily, Patricia Reddy MD, 80 mg at 05/20/24 0817    bisacodyL suppository 10 mg, 10 mg, Rectal, Daily PRN, Patricia Reddy MD, 10 mg at 05/12/24 0634    ceFAZolin (ANCEF) 1 g in dextrose 5 % in water (D5W) 50 mL IVPB (MB+), 1 g, Intravenous, Daily, Laney Underwood MD, Stopped at 05/20/24 1306    dextrose 10% bolus 125 mL 125 mL, 12.5 g, Intravenous, PRN, Ajay Baxter Jr., NP    dextrose 10% bolus 250 mL 250 mL, 25 g, Intravenous, PRN, Ajay Baxter Jr., NP    epoetin marisol-epbx injection 10,000 Units, 10,000 Units, Subcutaneous, Every Mon, Wed, Fri, Karen Tamez MD, 10,000 Units at 05/20/24 0818    finasteride tablet 5 mg, 5 mg, Oral, Daily, Ajay Baxter Jr., NP, 5 mg at 05/20/24 0818    glucagon (human recombinant) injection 1 mg, 1 mg, Intramuscular, PRN, Ajay Baxter Jr., NP    glucose chewable tablet 16 g, 16 g, Oral, PRN, Ajay Baxter Jr., NP    glucose chewable tablet 24 g, 24 g, Oral, PRN, Ajay Baxter Jr., NP    heparin 25,000 units in dextrose 5% (100 units/ml) IV bolus from bag HIGH INTENSITY  nomogram - [facility], 60 Units/kg (Adjusted), Intravenous, PRN, Martino, Nusrat-My T., DO    heparin 25,000 units in dextrose 5% (100 units/ml) IV bolus from bag HIGH INTENSITY nomogram - [facility], 30 Units/kg (Adjusted), Intravenous, PRN, Martino, Nusrat-My T., DO, 2,154 Units at 05/16/24 2114    heparin 25,000 units in dextrose 5% 250 mL (100 units/mL) infusion HIGH INTENSITY nomogram - [facility], 0-40 Units/kg/hr (Adjusted), Intravenous, Continuous, Martino, Nusrta-My T., DO, Last Rate: 8.6 mL/hr at 05/20/24 0530, 12 Units/kg/hr at 05/20/24 0530    insulin aspart U-100 pen 0-10 Units, 0-10 Units, Subcutaneous, QID (AC + HS) PRN, Ajay Baxter Jr., NP, 2 Units at 05/19/24 1650    labetaloL tablet 100 mg, 100 mg, Oral, Daily, Ajay Baxter Jr., NP, 100 mg at 05/20/24 1400    melatonin tablet 6 mg, 6 mg, Oral, Nightly PRN, Ajay Baxter Jr., NP, 6 mg at 05/19/24 2158    naloxone 0.4 mg/mL injection 0.02 mg, 0.02 mg, Intravenous, PRN, Ajay Baxter Jr., NP    ondansetron injection 4 mg, 4 mg, Intravenous, Q8H PRN, Ajay Baxter Jr., NP    oxyCODONE-acetaminophen 5-325 mg per tablet 1 tablet, 1 tablet, Oral, Q8H PRN, Ajay Baxter Jr., NP, 1 tablet at 05/12/24 1352    polyethylene glycol packet 17 g, 17 g, Oral, BID, Ajay Baxter Jr., NP, 17 g at 05/19/24 2158    prochlorperazine injection Soln 5 mg, 5 mg, Intravenous, Q6H PRN, Ajay Baxter Jr., NP    sevelamer carbonate tablet 800 mg, 800 mg, Oral, TID WM, Ajay Baxter Jr., NP, 800 mg at 05/20/24 1625    simethicone chewable tablet 80 mg, 1 tablet, Oral, QID PRN, Ajay Baxter Jr., NP    sodium chloride 0.9% bolus 250 mL 250 mL, 250 mL, Intravenous, PRN, Karen Tamez MD    sodium chloride 0.9% flush 10 mL, 10 mL, Intravenous, Q8H PRN, Ajay Baxter Jr., NP    tamsulosin 24 hr capsule 0.8 mg, 2 capsule, Oral, Daily, Ajay Baxter Jr., NP, 0.8 mg at 05/20/24 0817    Pharmacy to dose Vancomycin consult, , , Once **AND** vancomycin - pharmacy to dose, , Intravenous, pharmacy  to manage frequency, Homer Caraballo MD    vitamin renal formula (B-complex-vitamin c-folic acid) 1 mg per capsule 1 capsule, 1 capsule, Oral, Daily, Ajay Baxter Jr., NP, 1 capsule at 05/20/24 0892    Assessment and Plan:    68-year-old male with end-stage renal disease and right foot abscess.  He previously been treated for right ankle abscess.  He has evidence of necrosis of the skin on his foot.  He presented with a right foot plantar abscess draining purulent material.     Appreciate vascular surgery evaluation.     Spoke with the patient again.  He would like to try to salvage his foot if possible.  I explained to him that although the infection may be under better control at this time he likely still has some soft tissue infection in the foot.  Additionally in a matter of time and may start to drain again and have more problems.  I suggested he undergo surgery for I and D of his foot if he has not going to have an amputation.  He agrees.  Will set him up for I&D right foot.      NPO after midnight.    Jimbo Montilla MD  [facility]  [phone]  This note has been transcribed with voice recognition software, but not reviewed and may contain unrecognized errors.

## 2024-05-21 NOTE — ANESTHESIA POSTPROCEDURE EVALUATION
Anesthesia Post Evaluation    Patient: Miguel Moore    Procedure(s) Performed: Procedure(s) (LRB):  INCISION AND DRAINAGE, LOWER EXTREMITY (Right)    Final Anesthesia Type: MAC      Patient location during evaluation: PACU  Patient participation: Yes- Able to Participate  Level of consciousness: awake and alert and oriented  Post-procedure vital signs: reviewed and stable  Pain management: adequate  Airway patency: patent    PONV status at discharge: No PONV  Anesthetic complications: no      Cardiovascular status: hemodynamically stable and blood pressure returned to baseline  Respiratory status: spontaneous ventilation, room air and unassisted  Hydration status: euvolemic  Follow-up not needed.              Vitals Value Taken Time   /54 05/21/24 1732   Temp 36.7 °C (98.1 °F) 05/21/24 1710   Pulse 63 05/21/24 1739   Resp 27 05/21/24 1739   SpO2 97 % 05/21/24 1739   Vitals shown include unfiled device data.      Event Time   Out of Recovery 05/21/2024 17:40:00         Pain/Skyler Score: Skyler Score: 10 (5/21/2024  5:40 PM)

## 2024-05-21 NOTE — OP NOTE
Operative Note    Surgery Date: 5/21/2024     Surgeon:  Jimbo Montilla III, MD    Assistant:  Deneen Mccrary LPN, CST    Pre-op Diagnosis:  Right foot deep abscess    Post-op Diagnosis:  Same    Procedure:  Irrigation debridement right foot deep abscess, excisional    Indications:  68-year-old male with end-stage renal disease presents with right foot abscess.  Initially he declined surgery for I and D and requested to consider surgery for BKA.  Vascular surgery was consulted and performed angioplasty to right leg.  Increase in blood flow was noted.  The patient then decided to try for salvage.  Recommendation was made for irrigation debridement.  He eventually agreed to go forth with surgery for I and D of his foot.    Procedure in Detail:  After informed consent was obtained the patient was brought to surgery.  IV sedation was administered.  The right foot was prepped and draped in usual sterile fashion.  The saw RN with drainage was removed from the central aspect of the sole of his foot.  Some distal eschar was noted distally and laterally on the plantar aspect and more proximally medially smaller area.  The central removed.  Purulent material was seen beneath the skin and down to the fascia and the area was debrided and and subcutaneous tissue and fascia.  Deep cultures were obtained.  It was then irrigated with normal saline and Vashe and then the fascia was opened after opening the plantar fascia more deep abscess was seen beneath this.  Some of the plantar fascia was resected to gain access to the wound and it was soft.  Intramuscular abscess was encountered in the deep space of his foot.  It extended out distally to the metatarsal heads and more proximally beneath the midfoot.  Excisional carried out with scalpel scissors Rucker curette and sponges.  Skin wound measured 3 x 3 cm.  Deep extension was noted 3-0 4 more cm distally and 3 cm proximally.  The wound was thoroughly irrigated again with normal  saline and Vashe.  Hemostasis was obtained with electrocautery.  The wound was packed wet-to-dry with a single piece of Kerlix sponge soaked in Vashe.  It was then dressed with a wet-to-dry dressing.  He was brought to recovery stable condition.    Estimated Blood Loss:  Minimal    Complications:  none         Pathology specimen:   Specimens (From admission, onward)       Start     Ordered    05/21/24 1726  Specimen to Pathology, Surgery Orthopedics  Once        Comments: Pre-op Diagnosis: Foot ulcer [L97.509]Procedure(s):INCISION AND DRAINAGE, LOWER EXTREMITY Number of specimens: 1Name of specimens: Left ankle     References:    Click here for ordering Quick Tip   Question Answer Comment   Procedure Type: Orthopedics    Release to patient Immediate        05/21/24 1730                      Jimbo Montilla MD  [facility]  This note has been transcribed with voice recognition software, but not reviewed and may contain unrecognized errors.

## 2024-05-21 NOTE — TRANSFER OF CARE
"Anesthesia Transfer of Care Note    Patient: Miguel Moore    Procedure(s) Performed: Procedure(s) (LRB):  INCISION AND DRAINAGE, LOWER EXTREMITY (Right)    Patient location: PACU    Anesthesia Type: MAC    Transport from OR: Transported from OR on room air with adequate spontaneous ventilation    Post pain: adequate analgesia    Post assessment: no apparent anesthetic complications and tolerated procedure well    Post vital signs: stable    Level of consciousness: awake, alert and oriented    Nausea/Vomiting: no nausea/vomiting    Complications: none    Transfer of care protocol was followed      Last vitals: Visit Vitals  BP (!) 105/50 (BP Location: Right arm)   Pulse 62   Temp 36.7 °C (98.1 °F) (Oral)   Resp 17   Ht 5' 8" (1.727 m)   Wt 79.8 kg (175 lb 14.8 oz)   SpO2 97%   BMI 26.75 kg/m²     "

## 2024-05-21 NOTE — PLAN OF CARE
Imaging reviewed.  Patient appears to have adequate perfusion to heal wounds s/p angioplasty.  Will continue to follow healing and see in clinic.  No further vascular surgical intervention indicated at this time.  Cont wound care and Ortho mgmt.

## 2024-05-21 NOTE — NURSING
[facility], [facility]  Nurses Note -- 4 Eyes      5/21/2024       Skin assessed on: Q Shift      [] No Pressure Injuries Present    []Prevention Measures Documented    [x] Yes LDA  for Pressure Injury Previously documented     [] Yes New Pressure Injury Discovered   [] LDA for New Pressure Injury Added      Attending RN:  Iman Tipton RN     Second RN:  Diane Dowling LPN

## 2024-05-21 NOTE — PT/OT/SLP PROGRESS
Physical Therapy      Patient Name:  Miguel Moore   MRN:  [id number]    0924 Patient not seen at this time for PT eval secondary to (Pt unavailable, off the floor for testing/procedure.). Will follow-up.    1536 Patient not seen at this time for PT eval secondary to (Pt unavailable, off the floor for procedure/surgery.). Will follow-up.

## 2024-05-21 NOTE — PROGRESS NOTES
[facility] Medicine  Progress Note    Patient Name: Miguel Moore  MRN: [id number]  Patient Class: IP- Inpatient   Admission Date: 5/10/2024  Length of Stay: 11 days  Attending Physician: Moises Coles III, MD  Primary Care Provider: Raciel Raymond MD        Subjective:     Principal Problem:Open wound of right foot        HPI:  68 y.o. male with hypertension, BPH, chronic right lower extremity DVT, DM2, dyslipidemia, ESRD on HD, hemiplegia and hemiparesis following cerebral infarction affecting the right dominant side, paroxysmal AFib, seizure disorder as sequela of CVA sent to the ED from wound care for new wound infection.  Denies fever, chills, cough, shortness breath, chest pain, dizziness, syncope.  Was hospitalized in March for cellulitis and abscess to the right foot with surgical intervention at that time.  In the ED today, CRP and procalcitonin elevated.  Blood and wound cultures were obtained.  IV antibiotics initiated.    Overview/Hospital Course:  69yo M ESRD, PAD, R foot wounds s/p multiple surgeries admitted w worsening wound. Did have Staph epi in blood culture, likely contaminant. He does not want further debridement, wants BKA if needed. Started vancomycin and meropenem given his history of ESBL organisms. Ortho Bone and Joint consulted. Vascular consulted- plan for RLE angiogram on 05/16 . Nephro following for HD. He also has L heel dark area, XR left heel with Osseous structures demonstrate no evidence for acute fracture or osseous destructive lesion.  Mild diffuse demineralization. wound care consulted.       Interval History: Pt states he is doing fine ready for procedure. Denies fever or chills.     Review of Systems  Objective:     Vital Signs (Most Recent):  Temp: 98.7 °F (37.1 °C) (05/21/24 1118)  Pulse: 66 (05/21/24 1118)  Resp: 18 (05/21/24 0733)  BP: (!) 140/65 (05/21/24 1118)  SpO2: 100 % (05/21/24 1118) Vital Signs (24h Range):  Temp:  [97.5 °F (36.4  "°C)-98.7 °F (37.1 °C)] 98.7 °F (37.1 °C)  Pulse:  [65-74] 66  Resp:  [18-19] 18  SpO2:  [97 %-100 %] 100 %  BP: (128-172)/(60-80) 140/65     Weight: 79.8 kg (175 lb 14.8 oz)  Body mass index is 26.75 kg/m².    Intake/Output Summary (Last 24 hours) at 5/21/2024 1347  Last data filed at 5/20/2024 1738  Gross per 24 hour   Intake 240 ml   Output --   Net 240 ml         Physical Exam    Vitals reviewed.   Constitutional:       Appearance: He is ill-appearing (chronic).   HENT:      Mouth/Throat:      Mouth: Mucous membranes are moist.      Pharynx: Oropharynx is clear.   Cardiovascular:      Rate and Rhythm: Normal rate and regular rhythm.   Musculoskeletal:      Cervical back: Neck supple. No rigidity.   Skin:     General: Skin is warm and dry.   Neurological:      General: No focal deficit present.      Mental Status: He is alert and oriented to person, place, and time.    Significant Labs: All pertinent labs within the past 24 hours have been reviewed.    Significant Imaging: I have reviewed all pertinent imaging results/findings within the past 24 hours.    Assessment/Plan:      * Open wound of right foot  Per ID continue on vancomycin and cefazolin thru 5/20 (tomorrow)  S/p revascularization RLE as noted  BCX NGTD after initial BCX likely contaminant coag neg staph  Ortho following re: possible need for right BKA  multidisciplinary discussion of healing potential  Appears likely at this point that amputation will NOT be pursued  I note that recent xrays and CT right ankle March do NOT show any convincing evidence of osteomyelitis    -ortho plan for OR on 5/21      Atherosclerosis of native artery of right lower extremity with ulceration  As discussed under PAD    Subacute osteomyelitis of right foot  As per ID recs  Consider MRI of foot  Repeat ESR which was 88 May 10        Peripheral artery disease  Per Dr Lim 5/16  "successful right popliteal angioplasty with improved flow to RLE and foot. Will check post " "procedure flow with LOBO and toe pressures to evaluate healing potential. Continue wound care, offloading and optimization of comorbid conditions. Nutrition optimization. Will continue to follow"      Paroxysmal atrial fibrillation  CHADSVASC2 = 3  Eliquis has been on hold d/t peripheral intervention done 5/16, pt on heparin gtt        Anemia in chronic kidney disease  Stable  trend    Type 2 diabetes mellitus with diabetic neuropathy, with long-term current use of insulin  A1c 5.8%  SSI      Secondary hyperparathyroidism of renal origin  Continue sevelamer      ESRD (end stage renal disease)  HD MWF per renal      Hemiplegia and hemiparesis following cerebral infarction affecting right dominant side  No acute issues  Secondary preventive measures    BPH (benign prostatic hyperplasia) 2/18/21 prostate bx normal  Continue home regimen of tamsulosin and finasteride    Dyslipidemia  Statin was held d/t transaminitis  Resumed, trend LFT on atorva 80 mg    Benign essential HTN  Home regimen is unusual, labetalol 100 mg once daily  BP labile, HR in 60s  Added low dose amlodipine, BP improved a bit today  Pt has intolerance of ACEI (hyperkalemia)      VTE Risk Mitigation (From admission, onward)           Ordered     heparin 25,000 units in dextrose 5% (100 units/ml) IV bolus from bag HIGH INTENSITY nomogram - [facility]  As needed (PRN)        Question:  Heparin Infusion Adjustment (DO NOT MODIFY ANSWER)  Answer:  [email or web address]\Images\Pharmacy\HeparinInfusions\heparin HIGH INTENSITY nomogram for [facility] [id number]    05/14/24 1642     heparin 25,000 units in dextrose 5% (100 units/ml) IV bolus from bag HIGH INTENSITY nomogram - [facility]  As needed (PRN)        Question:  Heparin Infusion Adjustment (DO NOT MODIFY ANSWER)  Answer:  \[email or web address]\Images\Pharmacy\HeparinInfusions\heparin HIGH INTENSITY nomogram for [facility] [id number].pdf    05/14/24 1642     heparin 25,000 units in dextrose 5% 250 mL (100 units/mL) infusion HIGH INTENSITY " nomogram - [facility]  Continuous        Question:  Begin at (units/kg/hr)  Answer:  18 05/14/24 1642     heparin 25,000 units in dextrose 5% 250 mL (100 units/mL) infusion HIGH INTENSITY nomogram - [facility]  Continuous        Question:  Begin at (units/kg/hr)  Answer:  18 05/13/24 1416     IP VTE HIGH RISK PATIENT  Once         05/10/24 1758     Place sequential compression device  Until discontinued         05/10/24 1758     Reason for No Pharmacological VTE Prophylaxis  Once        Question:  Reasons:  Answer:  Already adequately anticoagulated on oral Anticoagulants    05/10/24 1758                    Discharge Planning   GARY:      Code Status: Full Code   Is the patient medically ready for discharge?:     Reason for patient still in hospital (select all that apply): Treatment and Consult recommendations  Discharge Plan A: Home with family   Discharge Delays: None known at this time              Moises Coles III, MD  Department of Hospital Medicine   [facility] [place] [facility]

## 2024-05-22 LAB
ALBUMIN SERPL BCP-MCNC: 1.9 G/DL (ref 3.5–5.2)
ALP SERPL-CCNC: 59 U/L (ref 55–135)
ALT SERPL W/O P-5'-P-CCNC: <5 U/L (ref 10–44)
ANION GAP SERPL CALC-SCNC: 11 MMOL/L (ref 8–16)
APTT PPP: 47.9 SEC (ref 21–32)
AST SERPL-CCNC: 10 U/L (ref 10–40)
BASOPHILS # BLD AUTO: 0.02 K/UL (ref 0–0.2)
BASOPHILS NFR BLD: 0.4 % (ref 0–1.9)
BILIRUB SERPL-MCNC: 0.4 MG/DL (ref 0.1–1)
BUN SERPL-MCNC: 30 MG/DL (ref 8–23)
CALCIUM SERPL-MCNC: 8.1 MG/DL (ref 8.7–10.5)
CHLORIDE SERPL-SCNC: 97 MMOL/L (ref 95–110)
CO2 SERPL-SCNC: 25 MMOL/L (ref 23–29)
CREAT SERPL-MCNC: 7.2 MG/DL (ref 0.5–1.4)
DIFFERENTIAL METHOD BLD: ABNORMAL
EOSINOPHIL # BLD AUTO: 0.2 K/UL (ref 0–0.5)
EOSINOPHIL NFR BLD: 4.6 % (ref 0–8)
ERYTHROCYTE [DISTWIDTH] IN BLOOD BY AUTOMATED COUNT: 17.6 % (ref 11.5–14.5)
EST. GFR  (NO RACE VARIABLE): 8 ML/MIN/1.73 M^2
GLUCOSE SERPL-MCNC: 101 MG/DL (ref 70–110)
GRAM STN SPEC: NORMAL
GRAM STN SPEC: NORMAL
HCT VFR BLD AUTO: 29.5 % (ref 40–54)
HGB BLD-MCNC: 9.3 G/DL (ref 14–18)
IMM GRANULOCYTES # BLD AUTO: 0.02 K/UL (ref 0–0.04)
IMM GRANULOCYTES NFR BLD AUTO: 0.4 % (ref 0–0.5)
LYMPHOCYTES # BLD AUTO: 0.9 K/UL (ref 1–4.8)
LYMPHOCYTES NFR BLD: 18.4 % (ref 18–48)
MCH RBC QN AUTO: 25.5 PG (ref 27–31)
MCHC RBC AUTO-ENTMCNC: 31.5 G/DL (ref 32–36)
MCV RBC AUTO: 81 FL (ref 82–98)
MONOCYTES # BLD AUTO: 0.5 K/UL (ref 0.3–1)
MONOCYTES NFR BLD: 10 % (ref 4–15)
NEUTROPHILS # BLD AUTO: 3.3 K/UL (ref 1.8–7.7)
NEUTROPHILS NFR BLD: 66.2 % (ref 38–73)
NRBC BLD-RTO: 0 /100 WBC
PLATELET # BLD AUTO: 163 K/UL (ref 150–450)
PMV BLD AUTO: 9.6 FL (ref 9.2–12.9)
POCT GLUCOSE: 102 MG/DL (ref 70–110)
POCT GLUCOSE: 105 MG/DL (ref 70–110)
POTASSIUM SERPL-SCNC: 4.4 MMOL/L (ref 3.5–5.1)
PROT SERPL-MCNC: 5.3 G/DL (ref 6–8.4)
RBC # BLD AUTO: 3.64 M/UL (ref 4.6–6.2)
SODIUM SERPL-SCNC: 133 MMOL/L (ref 136–145)
VANCOMYCIN SERPL-MCNC: 19.2 UG/ML
WBC # BLD AUTO: 5 K/UL (ref 3.9–12.7)

## 2024-05-22 PROCEDURE — 80202 ASSAY OF VANCOMYCIN: CPT | Mod: HCNC | Performed by: ORTHOPAEDIC SURGERY

## 2024-05-22 PROCEDURE — 80053 COMPREHEN METABOLIC PANEL: CPT | Mod: HCNC | Performed by: ORTHOPAEDIC SURGERY

## 2024-05-22 PROCEDURE — 25000003 PHARM REV CODE 250: Mod: HCNC | Performed by: STUDENT IN AN ORGANIZED HEALTH CARE EDUCATION/TRAINING PROGRAM

## 2024-05-22 PROCEDURE — 25000003 PHARM REV CODE 250: Mod: HCNC | Performed by: HOSPITALIST

## 2024-05-22 PROCEDURE — 85730 THROMBOPLASTIN TIME PARTIAL: CPT | Mod: HCNC | Performed by: ORTHOPAEDIC SURGERY

## 2024-05-22 PROCEDURE — 25000003 PHARM REV CODE 250: Mod: HCNC | Performed by: ORTHOPAEDIC SURGERY

## 2024-05-22 PROCEDURE — 11000001 HC ACUTE MED/SURG PRIVATE ROOM: Mod: HCNC

## 2024-05-22 PROCEDURE — 63600175 PHARM REV CODE 636 W HCPCS: Mod: HCNC | Performed by: ORTHOPAEDIC SURGERY

## 2024-05-22 PROCEDURE — 63600175 PHARM REV CODE 636 W HCPCS: Mod: HCNC | Performed by: STUDENT IN AN ORGANIZED HEALTH CARE EDUCATION/TRAINING PROGRAM

## 2024-05-22 PROCEDURE — 85025 COMPLETE CBC W/AUTO DIFF WBC: CPT | Mod: HCNC | Performed by: ORTHOPAEDIC SURGERY

## 2024-05-22 PROCEDURE — 63600175 PHARM REV CODE 636 W HCPCS: Mod: JZ,JG,HCNC | Performed by: ORTHOPAEDIC SURGERY

## 2024-05-22 PROCEDURE — 94761 N-INVAS EAR/PLS OXIMETRY MLT: CPT | Mod: HCNC

## 2024-05-22 PROCEDURE — 80100016 HC MAINTENANCE HEMODIALYSIS: Mod: HCNC

## 2024-05-22 PROCEDURE — 99233 SBSQ HOSP IP/OBS HIGH 50: CPT | Mod: HCNC,,,

## 2024-05-22 RX ADMIN — VANCOMYCIN HYDROCHLORIDE 500 MG: 500 INJECTION, POWDER, LYOPHILIZED, FOR SOLUTION INTRAVENOUS at 04:05

## 2024-05-22 RX ADMIN — POLYETHYLENE GLYCOL 3350 17 G: 17 POWDER, FOR SOLUTION ORAL at 08:05

## 2024-05-22 RX ADMIN — DEXTROSE MONOHYDRATE 1 G: 2.5 INJECTION INTRAVENOUS at 08:05

## 2024-05-22 RX ADMIN — EPOETIN ALFA-EPBX 10000 UNITS: 10000 INJECTION, SOLUTION INTRAVENOUS; SUBCUTANEOUS at 08:05

## 2024-05-22 RX ADMIN — TAMSULOSIN HYDROCHLORIDE 0.8 MG: 0.4 CAPSULE ORAL at 08:05

## 2024-05-22 RX ADMIN — Medication 1 CAPSULE: at 08:05

## 2024-05-22 RX ADMIN — SEVELAMER CARBONATE 800 MG: 800 TABLET, FILM COATED ORAL at 03:05

## 2024-05-22 RX ADMIN — OXYCODONE HYDROCHLORIDE AND ACETAMINOPHEN 1 TABLET: 5; 325 TABLET ORAL at 10:05

## 2024-05-22 RX ADMIN — LABETALOL HYDROCHLORIDE 100 MG: 100 TABLET, FILM COATED ORAL at 03:05

## 2024-05-22 RX ADMIN — ATORVASTATIN CALCIUM 80 MG: 40 TABLET, FILM COATED ORAL at 08:05

## 2024-05-22 RX ADMIN — AMLODIPINE BESYLATE 2.5 MG: 2.5 TABLET ORAL at 03:05

## 2024-05-22 RX ADMIN — HEPARIN SODIUM 12 UNITS/KG/HR: 10000 INJECTION, SOLUTION INTRAVENOUS at 07:05

## 2024-05-22 RX ADMIN — ASPIRIN 81 MG: 81 TABLET, COATED ORAL at 08:05

## 2024-05-22 RX ADMIN — SEVELAMER CARBONATE 800 MG: 800 TABLET, FILM COATED ORAL at 07:05

## 2024-05-22 RX ADMIN — SEVELAMER CARBONATE 800 MG: 800 TABLET, FILM COATED ORAL at 08:05

## 2024-05-22 RX ADMIN — FINASTERIDE 5 MG: 5 TABLET, FILM COATED ORAL at 08:05

## 2024-05-22 NOTE — PROGRESS NOTES
Patient did.  He reports no problems.    Temp:  [97.4 °F (36.3 °C)-98.7 °F (37.1 °C)] 98.5 °F (36.9 °C)  Pulse:  [61-80] 79  Resp:  [11-18] 18  SpO2:  [78 %-100 %] 99 %  BP: (105-153)/(46-72) 133/62    Physical examination:      Right foot dressing is in place.      Recent Labs   Lab 05/22/24  0419   WBC 5.00   RBC 3.64*   HGB 9.3*   HCT 29.5*      MCV 81*   MCH 25.5*   MCHC 31.5*         Current Facility-Administered Medications:     acetaminophen tablet 650 mg, 650 mg, Oral, Q6H PRN, Ajay Baxter Jr., NP    aluminum-magnesium hydroxide-simethicone 200-200-20 mg/5 mL suspension 30 mL, 30 mL, Oral, QID PRN, Ajay Baxter Jr., SHAYLA    amLODIPine tablet 2.5 mg, 2.5 mg, Oral, Daily, Jimbo Montilla III, MD, 2.5 mg at 05/21/24 0806    aspirin EC tablet 81 mg, 81 mg, Oral, Daily, Ajay Baxter Jr., NP, 81 mg at 05/21/24 0806    atorvastatin tablet 80 mg, 80 mg, Oral, Daily, Patricia Reddy MD, 80 mg at 05/21/24 0806    bisacodyL suppository 10 mg, 10 mg, Rectal, Daily PRN, Jimbo Montilla III, MD, 10 mg at 05/12/24 0634    ceFAZolin (ANCEF) 1 g in dextrose 5 % in water (D5W) 50 mL IVPB (MB+), 1 g, Intravenous, Daily, Jimbo Montilla III, MD, Stopped at 05/21/24 0840    dextrose 10% bolus 125 mL 125 mL, 12.5 g, Intravenous, PRN, Aajy Baxter Jr., NP    dextrose 10% bolus 250 mL 250 mL, 25 g, Intravenous, PRN, Ajay Baxter Jr., NP    epoetin marisol-epbx injection 10,000 Units, 10,000 Units, Subcutaneous, Every Mon, Wed, Roldan Maravilla Kearny Q. III, MD, 10,000 Units at 05/20/24 0818    finasteride tablet 5 mg, 5 mg, Oral, Daily, Ajay Baxter Jr., NP, 5 mg at 05/21/24 0805    glucagon (human recombinant) injection 1 mg, 1 mg, Intramuscular, PRN, Ajay Baxter Jr., NP    glucose chewable tablet 16 g, 16 g, Oral, PRN, Ajay Baxter Jr., NP    glucose chewable tablet 24 g, 24 g, Oral, PRN, Ajay Baxter Jr., NP    heparin 25,000 units in dextrose 5% (100 units/ml) IV bolus from bag HIGH  INTENSITY nomogram - [facility], 60 Units/kg (Adjusted), Intravenous, PRN, Jimbo Montilla III, MD    heparin 25,000 units in dextrose 5% (100 units/ml) IV bolus from bag HIGH INTENSITY nomogram - [facility], 30 Units/kg (Adjusted), Intravenous, PRN, Jimbo Montilla III, MD, 2,154 Units at 05/16/24 2114    heparin 25,000 units in dextrose 5% 250 mL (100 units/mL) infusion HIGH INTENSITY nomogram - [facility], 0-40 Units/kg/hr (Adjusted), Intravenous, Continuous, Jimbo Montilla III, MD, Last Rate: 8.6 mL/hr at 05/21/24 1841, 12 Units/kg/hr at 05/21/24 1841    HYDROmorphone (PF) injection 0.2 mg, 0.2 mg, Intravenous, Q5 Min PRN, Tony Barnett Chi, MD    insulin aspart U-100 pen 0-10 Units, 0-10 Units, Subcutaneous, QID (AC + HS) PRN, Ajay Baxter Jr., NP, 2 Units at 05/19/24 1650    labetaloL tablet 100 mg, 100 mg, Oral, Daily, Ajay Baxter Jr., NP, 100 mg at 05/21/24 0806    melatonin tablet 6 mg, 6 mg, Oral, Nightly PRN, Ajay Baxter Jr., NP, 6 mg at 05/19/24 2158    naloxone 0.4 mg/mL injection 0.02 mg, 0.02 mg, Intravenous, PRN, Ajay Baxter Jr., NP    ondansetron injection 4 mg, 4 mg, Intravenous, Q8H PRN, Ajay Baxter Jr., NP    ondansetron injection 4 mg, 4 mg, Intravenous, Daily PRN, Tony Barnett Chi, MD    oxyCODONE-acetaminophen 5-325 mg per tablet 1 tablet, 1 tablet, Oral, Q8H PRN, Jimbo Montilla III, MD, 1 tablet at 05/12/24 1352    polyethylene glycol packet 17 g, 17 g, Oral, BID, Jimbo Montilla III, MD, 17 g at 05/19/24 2158    prochlorperazine injection Soln 5 mg, 5 mg, Intravenous, Q6H PRN, Ajay Baxter Jr., NP    sevelamer carbonate tablet 800 mg, 800 mg, Oral, TID WM, Ajay Baxter Jr., NP, 800 mg at 05/21/24 1841    simethicone chewable tablet 80 mg, 1 tablet, Oral, QID PRN, Ajay Baxter Jr., NP    sodium chloride 0.9% bolus 250 mL 250 mL, 250 mL, Intravenous, PRN, Jimbo Montilla Q. III, MD    sodium chloride 0.9% flush 10 mL, 10 mL, Intravenous, Q8H PRN, Ajay Baxter Jr., NP     sodium chloride 0.9% flush 10 mL, 10 mL, Intravenous, PRN, Tony Barnett Chi, MD    tamsulosin 24 hr capsule 0.8 mg, 2 capsule, Oral, Daily, Ajay Baxter Jr., NP, 0.8 mg at 05/21/24 0805    Pharmacy to dose Vancomycin consult, , , Once **AND** vancomycin - pharmacy to dose, , Intravenous, pharmacy to manage frequency, Jimbo Montilla III, MD    vitamin renal formula (B-complex-vitamin c-folic acid) 1 mg per capsule 1 capsule, 1 capsule, Oral, Daily, Ajay Baxter Jr., NP, 1 capsule at 05/21/24 0805    Assessment and Plan:    68-year-old male with end-stage renal disease and right foot abscess.  He previously been treated for right ankle abscess.  He has evidence of necrosis of the skin on his foot.  He presented with a right foot plantar abscess draining purulent material.  After vascular surgery intervention for angioplasty he was taken to surgery on 5/21 for I&D of his right foot plantar wound.  Abscess with involvement into the deep musculature in the plantar space of his foot was encountered.      Right foot wound infection.  Cultures pending.  Begin dressing changes with wound care.   Hopefully he has enough improvement in blood flow to heal this infection and wound without amputation.    Appreciate vascular surgery assistance    End-stage renal disease-on dialysis    Discharge planning pending antibiotic management, appreciate ID assistance.    Jimbo Montilla MD  [facility]  [phone]  This note has been transcribed with voice recognition software, but not reviewed and may contain unrecognized errors.

## 2024-05-22 NOTE — ASSESSMENT & PLAN NOTE
Per ID continue on vancomycin and cefazolin thru 5/20 (tomorrow)  S/p revascularization RLE as noted  BCX NGTD after initial BCX likely contaminant coag neg staph  Ortho following re: possible need for right BKA  multidisciplinary discussion of healing potential  Appears likely at this point that amputation will NOT be pursued  I note that recent xrays and CT right ankle March do NOT show any convincing evidence of osteomyelitis    -status post I and D.  Culture sent and we will follow.  Discussed with ID who will also follow up culture results

## 2024-05-22 NOTE — SUBJECTIVE & OBJECTIVE
Interval History:  Patient is seen after dialysis feels good.  No pain in the foot at this time.  No fever or chills overnight    Review of Systems  Objective:     Vital Signs (Most Recent):  Temp: 98.5 °F (36.9 °C) (05/22/24 1537)  Pulse: 71 (05/22/24 1537)  Resp: 18 (05/22/24 1537)  BP: (!) 169/77 (05/22/24 1537)  SpO2: 100 % (05/22/24 1537) Vital Signs (24h Range):  Temp:  [97.4 °F (36.3 °C)-99 °F (37.2 °C)] 98.5 °F (36.9 °C)  Pulse:  [61-82] 71  Resp:  [11-18] 18  SpO2:  [78 %-100 %] 100 %  BP: (105-169)/(46-78) 169/77     Weight: 79.8 kg (175 lb 14.8 oz)  Body mass index is 26.75 kg/m².    Intake/Output Summary (Last 24 hours) at 5/22/2024 1540  Last data filed at 5/22/2024 1430  Gross per 24 hour   Intake 320 ml   Output 3000 ml   Net -2680 ml              [facility] Medicine  Progress Note     Patient Name: Miguel Moore  MRN: [id number]  Patient Class: IP- Inpatient     Admission Date: 5/10/2024  Length of Stay: 11 days  Attending Physician: Moises Coles III, MD  Primary Care Provider: Raciel Raymond MD           Subjective:      Principal Problem:Open wound of right foot           HPI:  68 y.o. male with hypertension, BPH, chronic right lower extremity DVT, DM2, dyslipidemia, ESRD on HD, hemiplegia and hemiparesis following cerebral infarction affecting the right dominant side, paroxysmal AFib, seizure disorder as sequela of CVA sent to the ED from wound care for new wound infection.  Denies fever, chills, cough, shortness breath, chest pain, dizziness, syncope.  Was hospitalized in March for cellulitis and abscess to the right foot with surgical intervention at that time.  In the ED today, CRP and procalcitonin elevated.  Blood and wound cultures were obtained.  IV antibiotics initiated.     Overview/Hospital Course:  69yo M ESRD, PAD, R foot wounds s/p multiple surgeries admitted w worsening wound. Did have Staph epi in blood culture, likely contaminant. He does not want  further debridement, wants BKA if needed. Started vancomycin and meropenem given his history of ESBL organisms. Ortho Bone and Joint consulted. Vascular consulted- plan for RLE angiogram on 05/16 . Nephro following for HD. He also has L heel dark area, XR left heel with Osseous structures demonstrate no evidence for acute fracture or osseous destructive lesion.  Mild diffuse demineralization. wound care consulted.         Interval History: Pt states he is doing fine ready for procedure. Denies fever or chills.      Review of Systems  Objective:      Vital Signs (Most Recent):  Temp: 98.7 °F (37.1 °C) (05/21/24 1118)  Pulse: 66 (05/21/24 1118)  Resp: 18 (05/21/24 0733)  BP: (!) 140/65 (05/21/24 1118)  SpO2: 100 % (05/21/24 1118) Vital Signs (24h Range):  Temp:  [97.5 °F (36.4 °C)-98.7 °F (37.1 °C)] 98.7 °F (37.1 °C)  Pulse:  [65-74] 66  Resp:  [18-19] 18  SpO2:  [97 %-100 %] 100 %  BP: (128-172)/(60-80) 140/65      Weight: 79.8 kg (175 lb 14.8 oz)  Body mass index is 26.75 kg/m².     Intake/Output Summary (Last 24 hours) at 5/21/2024 1347  Last data filed at 5/20/2024 1738      Gross per 24 hour   Intake 240 ml   Output --   Net 240 ml         Physical Exam    Vitals reviewed.   Constitutional:       Appearance: He is ill-appearing (chronic).   HENT:      Mouth/Throat:      Mouth: Mucous membranes are moist.      Pharynx: Oropharynx is clear.   Cardiovascular:      Rate and Rhythm: Normal rate and regular rhythm.   Musculoskeletal:      Cervical back: Neck supple. No rigidity.   Skin:     General: Skin is warm and dry.   Neurological:      General: No focal deficit present.      Mental Status: He is alert and oriented to person, place, and time.             Significant Labs: All pertinent labs within the past 24 hours have been reviewed.    Significant Imaging: I have reviewed all pertinent imaging results/findings within the past 24 hours.

## 2024-05-22 NOTE — PLAN OF CARE
Problem: Diabetes Comorbidity  Goal: Blood Glucose Level Within Targeted Range  Intervention: Monitor and Manage Glycemia  Flowsheets (Taken 5/22/2024 1824)  Glycemic Management: blood glucose monitored     Problem: Wound  Goal: Optimal Functional Ability  Intervention: Optimize Functional Ability  Flowsheets (Taken 5/22/2024 1824)  Activity Management:   Arm raise - L1   Heel slide - L1  Activity Assistance Provided: assistance, 1 person

## 2024-05-22 NOTE — PT/OT/SLP PROGRESS
Occupational Therapy      Patient Name:  Miguel Moore   MRN:  [id number]    Patient not seen today secondary to Dialysis. Will follow-up tomorrow.    5/22/2024

## 2024-05-22 NOTE — PROGRESS NOTES
Pharmacokinetic Assessment Follow Up: IV Vancomycin    Vancomycin serum concentration assessment(s):    The random level was drawn correctly and can be used to guide therapy at this time. The measurement is within the desired definitive target range of 10 to 20 mcg/mL.    Vancomycin Regimen Plan:    Give 500 mg after dialysis today.  Re-dose when the random level is less than 20 mcg/mL, next level to be drawn at 0400 on 5/24/2024    Drug levels (last 3 results):  Recent Labs   Lab Result Units 05/20/24 0415 05/22/24  0419   Vancomycin, Random ug/mL 17.9 19.2       Pharmacy will continue to follow and monitor vancomycin.    Please contact pharmacy at extension [phone] for questions regarding this assessment.    Thank you for the consult,   Jakob Goldberg Jr       Patient brief summary:  Miguel Moore is a 68 y.o. male initiated on antimicrobial therapy with IV Vancomycin for treatment of skin & soft tissue infection    Drug Allergies:   Review of patient's allergies indicates:   Allergen Reactions    Ace inhibitors      Hyperkalemia 8/2018  Other reaction(s): Unknown    Penicillins Hives     Tolerated cefepime and cefdinir previously    Tizanidine      Felt hot       Actual Body Weight:   79.8 kg    Renal Function:   Estimated Creatinine Clearance: 9.4 mL/min (A) (based on SCr of 7.2 mg/dL (H)).,     Dialysis Method (if applicable):  intermittent HD    CBC (last 72 hours):  Recent Labs   Lab Result Units 05/20/24 0415 05/21/24  0415 05/22/24  0419   WBC K/uL 3.97 4.12 5.00   Hemoglobin g/dL 9.3* 9.1* 9.3*   Hematocrit % 29.1* 29.7* 29.5*   Platelets K/uL 163 153 163   Gran % % 55.6 58.5 66.2   Lymph % % 27.5 24.0 18.4   Mono % % 10.8 11.4 10.0   Eosinophil % % 5.3 5.1 4.6   Basophil % % 0.5 0.5 0.4   Differential Method  Automated Automated Automated       Metabolic Panel (last 72 hours):  Recent Labs   Lab Result Units 05/20/24 0415 05/22/24  0419   Sodium mmol/L 130* 133*   Potassium mmol/L 4.4 4.4   Chloride  mmol/L 97 97   CO2 mmol/L 23 25   Glucose mg/dL 89 101   BUN mg/dL 34* 30*   Creatinine mg/dL 7.0* 7.2*   Albumin g/dL 1.9* 1.9*   Total Bilirubin mg/dL 0.3 0.4   Alkaline Phosphatase U/L 63 59   AST U/L 12 10   ALT U/L <5* <5*       Vancomycin Administrations:  vancomycin given in the last 96 hours                     vancomycin (VANCOCIN) 500 mg in dextrose 5 % in water (D5W) 100 mL IVPB (MB+) (mg) 500 mg New Bag 05/20/24 1756                    Microbiologic Results:  Microbiology Results (last 7 days)       Procedure Component Value Units Date/Time    Culture, Anaerobe [[id number]] Collected: 05/21/24 1731    Order Status: Sent Specimen: Incision site from Foot, Left Updated: 05/21/24 1756    Aerobic culture [[id number]] Collected: 05/21/24 1731    Order Status: Sent Specimen: Incision site from Foot, Left Updated: 05/21/24 1756    AFB Culture & Smear [[id number]] Collected: 05/21/24 1731    Order Status: Sent Specimen: Incision site from Foot, Left Updated: 05/21/24 1756    Fungus culture [[id number]] Collected: 05/21/24 1731    Order Status: Sent Specimen: Incision site from Foot, Left Updated: 05/21/24 1755    Gram stain [[id number]] Collected: 05/21/24 1731    Order Status: Sent Specimen: Incision site from Foot, Left Updated: 05/21/24 1755    Blood culture [[id number]] Collected: 05/11/24 2006    Order Status: Completed Specimen: Blood from Peripheral, Hand, Right Updated: 05/15/24 2303     Blood Culture, Routine No Growth after 4 days.    Narrative:      could not draw second set cultures, due to patient being a hard   stick. reported to nurse Hidalgo    Blood culture [[id number]] Collected: 05/11/24 1335    Order Status: Completed Specimen: Blood Updated: 05/15/24 1703     Blood Culture, Routine No Growth after 4 days.

## 2024-05-22 NOTE — PT/OT/SLP PROGRESS
Physical Therapy      Patient Name:  Miguel Moore   MRN:  [id number]    Patient not seen today secondary to Dialysis. Will follow-up .

## 2024-05-22 NOTE — NURSING
[facility] [place]  Nurses Note -- 4 Eyes      5/22/2024       Skin assessed on: Q Shift      [x] No Pressure Injuries Present    []Prevention Measures Documented    [] Yes LDA  for Pressure Injury Previously documented     [] Yes New Pressure Injury Discovered   [] LDA for New Pressure Injury Added      Attending RN:  Danielle Ryan LPN     Second RN:  DARRION Mcneil

## 2024-05-22 NOTE — PT/OT/SLP PROGRESS
Occupational Therapy      Patient Name:  Miguel Moore   MRN:  [id number]    Patient not seen today secondary to Dialysis. Will follow-up later today.    5/22/2024

## 2024-05-22 NOTE — CONSULTS
[facility] - Observation  Wound Care  [facility]    Patient Name:  Miguel Moore   MRN:  [id number]  Date: 5/22/2024  Diagnosis: Open wound of right foot    History:     Past Medical History:   Diagnosis Date    Allergy     Clotting disorder     Elaquis and APlavix    Deep vein thrombosis     Diabetes mellitus, type 2     Hypertension     Nuclear sclerosis of both eyes 6/9/2017    Renal disorder     Seizures     Stroke     Urinary tract infection        Social History     Socioeconomic History    Marital status: Single   Occupational History    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, etc   Tobacco Use    Smoking status: Never    Smokeless tobacco: Never   Substance and Sexual Activity    Alcohol use: No    Drug use: No   Social History Narrative    Lives with daughter       Precautions:     Allergies as of 05/10/2024 - Reviewed 05/10/2024   Allergen Reaction Noted    Ace inhibitors  09/03/2018    Penicillins Hives 01/13/2015    Tizanidine  06/30/2020       [facility] Assessment Details/Treatment     Active Problem List with Overview Notes    Diagnosis Date Noted    Subacute osteomyelitis of right foot 05/13/2024    Atherosclerosis of native artery of right lower extremity with ulceration 05/13/2024    Open wound of right foot 05/10/2024    Peripheral artery disease 04/25/2024     3/20/24 RLE arterial US Arterial vasculature of the right lower extremity is patent.  However, there is evidence of atherosclerotic change with stenosis at the level of popliteal artery.       Abdominal aortic atherosclerosis on CT 4/1/24 04/25/2024    Open wound 03/26/2024    Paroxysmal atrial fibrillation 03/20/2024     3/20/24 TTE LV normal systolic function LVEF 55-60%.  Converted to sinus rhythm on amiodarone   Amiodarone stopped on hospital discharge due to possible contributor to transaminitis      Bilateral posterior capsular opacification 05/02/2023    Pseudophakia 01/06/2022    History of diabetic ulcer of foot     Elevated  PSA 2/18/21 prostate bx normal 02/18/2021 2/18/21 prostate bx normal  1/25/24 MRI prostate PIRADS 2      Pulmonary nodule 1/2021 4 mm nodule. 01/06/2021 1/6/2021 CT abd/pelvis: 4 mm nodule in the right middle lobe      Chronic deep vein thrombosis (DVT) of popliteal vein of right lower extremity 9/21/20 outside US; unprovoked; anticoagulation 09/21/2020    History of fungal infection candida parasilosis hospitalization 8/2020 08/29/2020     -Found to have candidemia - source unclear  -infectious disease consulted, Started on micafungin and now converted to fluconazole  -Repeat cultures negative so far  -Dialysis line removed 8/28.   line replaced on 08/31 after line holiday.  -end date of fluconazole will be 09/11/2020.  Patient has ophthalmology follow-up scheduled on 09/08/2020. Tentative end date 9/11/2020 If no endophthalmitis. If noted to have endophthalmitis during optho visit, would need at least 4-6 weeks of treatment      Anemia in chronic kidney disease 08/26/2020    Type 2 diabetes mellitus with diabetic neuropathy, with long-term current use of insulin 05/10/2018    History of stroke 12/04/2017    CME (cystoid macular edema), bilateral 11/16/2017    Refractive error 11/16/2017    Senile nuclear sclerosis 10/05/2017    Right sided weakness 09/11/2017    Secondary hyperparathyroidism of renal origin 08/03/2017    Vitamin D deficiency 08/03/2017    Nuclear sclerosis, left 06/09/2017    Cortical cataract of both eyes 06/09/2017    DM type 2 without retinopathy 06/09/2017    Microcytosis 9/2019 labs c/w AoCD and AoCKD 03/22/2017     Seen on admission as well 2015; normal Hb, low MCV.  Normal RDW  08/17/2020 EGD normal esophagus.  Discolored nodular and texture change mucosa in the antrum.  Normal duodenum.  Path with foveolar hyperplasia and early xanthoma formation.  H pylori negative.  Mild chronic inflammation without acute activity      ESRD (end stage renal disease) 03/22/2017 2/27/20 renal  US with dopplers no ALF.  Medical renal disease  8/2020 renal US: 1. Symmetric diffusely increased cortical echogenicity and elevated resistive indices, nonspecific indicators of chronic medical renal disease.  2. Prostatomegaly.  Some of the provided images are suggestive of a distinct hyperechoic component of the prostate gland, of uncertain etiology or significance, and potentially artifactual.  Dedicated prostate ultrasound or MRI may be of benefit for further characterization.    Started on HD while hospitalized for progression to ESRD 8/2020  -Continue dialysis per nephrology  -Outpatient HD has been arranged  -Had planned discharge 8/27 if remained afebrile and cultures negative.  Unfortunately his blood culture grew Candida parapsilosis  -Dr. Gomez with ID consulted and case discussed with him.  Started micafungin 8/27 and now on fluconazole.  -Dialysis line removed after HD 8/28 and plan line holiday over weekend.  -new line by IR on 8/31, tolerated dialysis fluid.  -continue outpatient dialysis.      Benign essential HTN 03/21/2017 01/06/2021 TTE mild left atrial enlargement.  LV normal size and systolic function LVEF 55%.  Indeterminate diastolic function.  Mild right atrial enlargement.  Normal RV systolic function.  Normal RV size.  PA pressure 20.  Normal CVP.  Three.      Dyslipidemia 03/21/2017    BPH (benign prostatic hyperplasia) 2/18/21 prostate bx normal 03/21/2017 6/26/2017 renal US mod prostatomegaly.      Seizure disorder as sequela of cerebrovascular accident 03/21/2017    Hemiplegia and hemiparesis following cerebral infarction affecting right dominant side 03/21/2017      Consulted for wounds right foot plantar deep space per Ortho- dressing change and recommendations for home health dressing changes for discharge  A 68 year old male admitted 5/10/24 from home with complaint of possible new infection in wound.   Patient seen by [facility] nurse 5/13/24 and treatment plan developed for  care of right foot wounds and pressure injury prevention.  5/22 WBC 5.0 Hgb 9.3 Hct 29.5 Alb 1.9   5/16 S/P RLE angiogram with balloon angioplasty of popliteal artery per Dr. Lim  5/21 S/P Irrigation debridement right foot deep abscess, excisional per Dr. Montilla - abscess with involvement into the deep musculature in the plantar space of his foot was encountered  5/22 Ortho- Begin dressing changes. Hopefully, has enough improvement in blood flow to heal this infection and wound without amputation  Assessment:  Currently in dialysis. Will assess foot wound and develop treatment plan when returns to room  1645 Patient returned to room. Removed postop dressing- removed gauze packing from plantar foot wound. Small amount of bleeding from wound. No purulent drainage from wounds.   Photodocumentation    Right plantar foot- Opening midfoot with undermining 5 cm from 11-1 o'clock. Undermining 5 cm at 6 o'clock. Eschar remains adjacent to opening and over 4th- 5th MTP.    Right lateral heel- 3.5 cm stable eschar    Right lateral foot/ankle-Incisions from I&D granulating inward. Red granular base. No edema lower leg.  Treatment/Plan:  Cleansed wounds with Vashe.   Filled plantar foot wound with Aquacel Ag hydrofiber cut into ribbon- wick of dressing exiting wound for removal. Filled center of wound with Aquacel Ag hydrofiber. Covered lateral foot/ankle with hydrofiber.  Applied dry gauze over eschar on heel and plantar foot. Wrapped with 2 rolls of Kerlix. Replaced EHOB boot.  To avoid pressure on foot and keep elevated.   Recommendations made to primary team. Orders placed. Nursing to change dressing daily.     05/22/2024

## 2024-05-22 NOTE — PROGRESS NOTES
Date of Admission:5/10/2024    SUBJECTIVE: notes doing ok, denies fever    Current Facility-Administered Medications   Medication    acetaminophen tablet 650 mg    aluminum-magnesium hydroxide-simethicone 200-200-20 mg/5 mL suspension 30 mL    amLODIPine tablet 2.5 mg    aspirin EC tablet 81 mg    atorvastatin tablet 80 mg    bisacodyL suppository 10 mg    ceFAZolin (ANCEF) 1 g in dextrose 5 % in water (D5W) 50 mL IVPB (MB+)    dextrose 10% bolus 125 mL 125 mL    dextrose 10% bolus 250 mL 250 mL    epoetin marisol-epbx injection 10,000 Units    finasteride tablet 5 mg    glucagon (human recombinant) injection 1 mg    glucose chewable tablet 16 g    glucose chewable tablet 24 g    heparin 25,000 units in dextrose 5% (100 units/ml) IV bolus from bag HIGH INTENSITY nomogram - [facility]    heparin 25,000 units in dextrose 5% (100 units/ml) IV bolus from bag HIGH INTENSITY nomogram - [facility]    heparin 25,000 units in dextrose 5% 250 mL (100 units/mL) infusion HIGH INTENSITY nomogram - [facility]    HYDROmorphone (PF) injection 0.2 mg    insulin aspart U-100 pen 0-10 Units    labetaloL tablet 100 mg    melatonin tablet 6 mg    naloxone 0.4 mg/mL injection 0.02 mg    ondansetron injection 4 mg    ondansetron injection 4 mg    oxyCODONE-acetaminophen 5-325 mg per tablet 1 tablet    polyethylene glycol packet 17 g    prochlorperazine injection Soln 5 mg    sevelamer carbonate tablet 800 mg    simethicone chewable tablet 80 mg    sodium chloride 0.9% bolus 250 mL 250 mL    sodium chloride 0.9% flush 10 mL    sodium chloride 0.9% flush 10 mL    tamsulosin 24 hr capsule 0.8 mg    vancomycin - pharmacy to dose    vitamin renal formula (B-complex-vitamin c-folic acid) 1 mg per capsule 1 capsule       Wt Readings from Last 3 Encounters:   05/20/24 79.8 kg (175 lb 14.8 oz)   04/02/24 90.4 kg (199 lb 4.7 oz)   03/07/24 79.8 kg (175 lb 14.8 oz)     Temp Readings from Last 3 Encounters:   05/22/24 98.5 °F (36.9 °C) (Oral)   04/19/24 98.3 °F (36.8 °C)  (Oral)   02/06/24 98 °F (36.7 °C) (Oral)     BP Readings from Last 3 Encounters:   05/22/24 133/62   04/19/24 (!) 138/53   03/07/24 129/65     Pulse Readings from Last 3 Encounters:   05/22/24 79   04/19/24 79   02/06/24 80       Intake/Output Summary (Last 24 hours) at 5/22/2024 1023  Last data filed at 5/22/2024 0857  Gross per 24 hour   Intake 320 ml   Output --   Net 320 ml       PE:  GEN:wd male in nad  HEENT:ncat,eomi,mm  CVS:s1s2 regular  PULM:ctab  ABD:bs,soft  EXT:no leg edema, avf of arm  NEURO:awake,alert    Recent Labs   Lab 05/22/24  0419      *   K 4.4   CL 97   CO2 25   BUN 30*   CREATININE 7.2*   CALCIUM 8.1*       Lab Results   Component Value Date     (H) 04/22/2024    CALCIUM 8.1 (L) 05/22/2024    PHOS 2.9 05/19/2024       Recent Labs   Lab 05/22/24  0419   WBC 5.00   RBC 3.64*   HGB 9.3*   HCT 29.5*      MCV 81*   MCH 25.5*   MCHC 31.5*           A/P:  1.esrd. cont hd MWF. Seen on hd.  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 Epo cont.   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 Cont binders. Phos ok.  4.dm2. following sugars.  5.htn. cont bp meds.  6.foot infection. Cont care.

## 2024-05-22 NOTE — NURSING
Report received from Dipak. Patient returned from dialysis via bed. No signs of distress noted. Vs obtained. Heparin still going.

## 2024-05-22 NOTE — ASSESSMENT & PLAN NOTE
CHADSVASC2 = 3  Eliquis has been on hold d/t peripheral intervention done 5/16, pt on heparin .  We will discuss with ortho transitioned back to Eliquis likely tomorrow

## 2024-05-22 NOTE — PT/OT/SLP PROGRESS
Physical Therapy      Patient Name:  Miguel Moore   MRN:  [id number]    Patient not seen today secondary to Dialysis. Will follow-up .

## 2024-05-22 NOTE — PROGRESS NOTES
[facility] Medicine  Progress Note    Patient Name: Miguel Moore  MRN: [id number]  Patient Class: IP- Inpatient   Admission Date: 5/10/2024  Length of Stay: 12 days  Attending Physician: Moises Coles III, MD  Primary Care Provider: Raciel Raymond MD        Subjective:     Principal Problem:Open wound of right foot        HPI:  68 y.o. male with hypertension, BPH, chronic right lower extremity DVT, DM2, dyslipidemia, ESRD on HD, hemiplegia and hemiparesis following cerebral infarction affecting the right dominant side, paroxysmal AFib, seizure disorder as sequela of CVA sent to the ED from wound care for new wound infection.  Denies fever, chills, cough, shortness breath, chest pain, dizziness, syncope.  Was hospitalized in March for cellulitis and abscess to the right foot with surgical intervention at that time.  In the ED today, CRP and procalcitonin elevated.  Blood and wound cultures were obtained.  IV antibiotics initiated.    Overview/Hospital Course:  69yo M ESRD, PAD, R foot wounds s/p multiple surgeries admitted w worsening wound. Did have Staph epi in blood culture, likely contaminant. He does not want further debridement, wants BKA if needed. Started vancomycin and meropenem given his history of ESBL organisms. Ortho Bone and Joint consulted. Vascular consulted- plan for RLE angiogram on 05/16 . Nephro following for HD. He also has L heel dark area, XR left heel with Osseous structures demonstrate no evidence for acute fracture or osseous destructive lesion.  Mild diffuse demineralization. wound care consulted.       Interval History:  Patient is seen after dialysis feels good.  No pain in the foot at this time.  No fever or chills overnight    Review of Systems  Objective:     Vital Signs (Most Recent):  Temp: 98.5 °F (36.9 °C) (05/22/24 1537)  Pulse: 71 (05/22/24 1537)  Resp: 18 (05/22/24 1537)  BP: (!) 169/77 (05/22/24 1537)  SpO2: 100 % (05/22/24 1537) Vital Signs (24h  Range):  Temp:  [97.4 °F (36.3 °C)-99 °F (37.2 °C)] 98.5 °F (36.9 °C)  Pulse:  [61-82] 71  Resp:  [11-18] 18  SpO2:  [78 %-100 %] 100 %  BP: (105-169)/(46-78) 169/77     Weight: 79.8 kg (175 lb 14.8 oz)  Body mass index is 26.75 kg/m².    Intake/Output Summary (Last 24 hours) at 5/22/2024 1540  Last data filed at 5/22/2024 1430  Gross per 24 hour   Intake 320 ml   Output 3000 ml   Net -2680 ml              [facility] Medicine  Progress Note     Patient Name: Miguel Moore  MRN: [id number]  Patient Class: IP- Inpatient     Admission Date: 5/10/2024  Length of Stay: 11 days  Attending Physician: Moises Coles III, MD  Primary Care Provider: Raciel Raymond MD           Subjective:      Principal Problem:Open wound of right foot           HPI:  68 y.o. male with hypertension, BPH, chronic right lower extremity DVT, DM2, dyslipidemia, ESRD on HD, hemiplegia and hemiparesis following cerebral infarction affecting the right dominant side, paroxysmal AFib, seizure disorder as sequela of CVA sent to the ED from wound care for new wound infection.  Denies fever, chills, cough, shortness breath, chest pain, dizziness, syncope.  Was hospitalized in March for cellulitis and abscess to the right foot with surgical intervention at that time.  In the ED today, CRP and procalcitonin elevated.  Blood and wound cultures were obtained.  IV antibiotics initiated.     Overview/Hospital Course:  69yo M ESRD, PAD, R foot wounds s/p multiple surgeries admitted w worsening wound. Did have Staph epi in blood culture, likely contaminant. He does not want further debridement, wants BKA if needed. Started vancomycin and meropenem given his history of ESBL organisms. Ortho Bone and Joint consulted. Vascular consulted- plan for RLE angiogram on 05/16 . Nephro following for HD. He also has L heel dark area, XR left heel with Osseous structures demonstrate no evidence for acute fracture or osseous destructive lesion.   Mild diffuse demineralization. wound care consulted.         Interval History: Pt states he is doing fine ready for procedure. Denies fever or chills.      Review of Systems  Objective:      Vital Signs (Most Recent):  Temp: 98.7 °F (37.1 °C) (05/21/24 1118)  Pulse: 66 (05/21/24 1118)  Resp: 18 (05/21/24 0733)  BP: (!) 140/65 (05/21/24 1118)  SpO2: 100 % (05/21/24 1118) Vital Signs (24h Range):  Temp:  [97.5 °F (36.4 °C)-98.7 °F (37.1 °C)] 98.7 °F (37.1 °C)  Pulse:  [65-74] 66  Resp:  [18-19] 18  SpO2:  [97 %-100 %] 100 %  BP: (128-172)/(60-80) 140/65      Weight: 79.8 kg (175 lb 14.8 oz)  Body mass index is 26.75 kg/m².     Intake/Output Summary (Last 24 hours) at 5/21/2024 1347  Last data filed at 5/20/2024 1738      Gross per 24 hour   Intake 240 ml   Output --   Net 240 ml         Physical Exam    Vitals reviewed.   Constitutional:       Appearance: He is ill-appearing (chronic).   HENT:      Mouth/Throat:      Mouth: Mucous membranes are moist.      Pharynx: Oropharynx is clear.   Cardiovascular:      Rate and Rhythm: Normal rate and regular rhythm.   Musculoskeletal:      Cervical back: Neck supple. No rigidity.   Skin:     General: Skin is warm and dry.   Neurological:      General: No focal deficit present.      Mental Status: He is alert and oriented to person, place, and time.             Significant Labs: All pertinent labs within the past 24 hours have been reviewed.    Significant Imaging: I have reviewed all pertinent imaging results/findings within the past 24 hours.    Assessment/Plan:      * Open wound of right foot  Per ID continue on vancomycin and cefazolin thru 5/20 (tomorrow)  S/p revascularization RLE as noted  BCX NGTD after initial BCX likely contaminant coag neg staph  Ortho following re: possible need for right BKA  multidisciplinary discussion of healing potential  Appears likely at this point that amputation will NOT be pursued  I note that recent xrays and CT right ankle March do NOT  "show any convincing evidence of osteomyelitis    -status post I and D.  Culture sent and we will follow.  Discussed with ID who will also follow up culture results      Atherosclerosis of native artery of right lower extremity with ulceration  As discussed under PAD    Subacute osteomyelitis of right foot  As per ID recs  Consider MRI of foot  Repeat ESR which was 88 May 10        Peripheral artery disease  Per Dr Lim 5/16  "successful right popliteal angioplasty with improved flow to RLE and foot. Will check post procedure flow with LOBO and toe pressures to evaluate healing potential. Continue wound care, offloading and optimization of comorbid conditions. Nutrition optimization. Will continue to follow"      Paroxysmal atrial fibrillation  CHADSVASC2 = 3  Eliquis has been on hold d/t peripheral intervention done 5/16, pt on heparin .  We will discuss with ortho transitioned back to Eliquis likely tomorrow        Anemia in chronic kidney disease  Stable  trend    Type 2 diabetes mellitus with diabetic neuropathy, with long-term current use of insulin  A1c 5.8%  SSI      Secondary hyperparathyroidism of renal origin  Continue sevelamer      ESRD (end stage renal disease)  HD MWF per renal      Hemiplegia and hemiparesis following cerebral infarction affecting right dominant side  No acute issues  Secondary preventive measures    BPH (benign prostatic hyperplasia) 2/18/21 prostate bx normal  Continue home regimen of tamsulosin and finasteride    Dyslipidemia  Statin was held d/t transaminitis  Resumed, trend LFT on atorva 80 mg    Benign essential HTN  Home regimen is unusual, labetalol 100 mg once daily  BP labile, HR in 60s  Added low dose amlodipine, BP improved a bit today  Pt has intolerance of ACEI (hyperkalemia)      VTE Risk Mitigation (From admission, onward)           Ordered     heparin 25,000 units in dextrose 5% (100 units/ml) IV bolus from bag HIGH INTENSITY nomogram - [facility]  As needed (PRN)      "   Question:  Heparin Infusion Adjustment (DO NOT MODIFY ANSWER)  Answer:  [email or web address]\epic\Images\Pharmacy\HeparinInfusions\heparin HIGH INTENSITY nomogram for [facility] [id number]    05/14/24 1642     heparin 25,000 units in dextrose 5% (100 units/ml) IV bolus from bag HIGH INTENSITY nomogram - [facility]  As needed (PRN)        Question:  Heparin Infusion Adjustment (DO NOT MODIFY ANSWER)  Answer:  [email or web address]\epic\Images\Pharmacy\HeparinInfusions\heparin HIGH INTENSITY nomogram for [facility] [id number]    05/14/24 1642     heparin 25,000 units in dextrose 5% 250 mL (100 units/mL) infusion HIGH INTENSITY nomogram - [facility]  Continuous        Question:  Begin at (units/kg/hr)  Answer:  18    05/14/24 1642     heparin 25,000 units in dextrose 5% 250 mL (100 units/mL) infusion HIGH INTENSITY nomogram - [facility]  Continuous        Question:  Begin at (units/kg/hr)  Answer:  18 05/13/24 1416     IP VTE HIGH RISK PATIENT  Once         05/10/24 1758     Place sequential compression device  Until discontinued         05/10/24 1758     Reason for No Pharmacological VTE Prophylaxis  Once        Question:  Reasons:  Answer:  Already adequately anticoagulated on oral Anticoagulants    05/10/24 1758                    Discharge Planning   GARY: 5/24/2024     Code Status: Full Code   Is the patient medically ready for discharge?:     Reason for patient still in hospital (select all that apply): Treatment  Discharge Plan A: Home with family   Discharge Delays: None known at this time              Moises Coles III, MD  Department of Hospital Medicine   [facility]

## 2024-05-22 NOTE — NURSING
[facility], [facility]  Nurses Note -- 4 Eyes      5/22/2024       Skin assessed on: Q Shift      [x] No Pressure Injuries Present    []Prevention Measures Documented    [] Yes LDA  for Pressure Injury Previously documented     [] Yes New Pressure Injury Discovered   [] LDA for New Pressure Injury Added      Attending RN:  Danielle Ryan LPN     Second RN:  DARRION Kessler

## 2024-05-22 NOTE — NURSING
[facility] MEWS TRIGGER FOLLOW UP       MEWS Monitoring, Score is: 3  Indication for review: LOC     Bedside Nurse, Herminia contacted, no concerns verbalized at this time, instructed to call [phone] for further concerns or assistance..

## 2024-05-22 NOTE — NURSING
[facility], [facility] - [place], [place]  Nurses Note -- 4 Eyes      5/21/2024       Skin assessed on: Q Shift      [] No Pressure Injuries Present    []Prevention Measures Documented    [x] Yes LDA  for Pressure Injury Previously documented     [] Yes New Pressure Injury Discovered   [] LDA for New Pressure Injury Added      Attending RN:  Diane Dowling LPN     Second RN:  Iman Tipton RN

## 2024-05-23 ENCOUNTER — DOCUMENT SCAN (OUTPATIENT)
Dept: HOME HEALTH SERVICES | Facility: HOSPITAL | Age: 69
End: 2024-05-23
Payer: MEDICARE

## 2024-05-23 LAB
ACID FAST MOD KINY STN SPEC: NORMAL
APTT PPP: 40.4 SEC (ref 21–32)
BASOPHILS # BLD AUTO: 0.02 K/UL (ref 0–0.2)
BASOPHILS NFR BLD: 0.5 % (ref 0–1.9)
DIFFERENTIAL METHOD BLD: ABNORMAL
EOSINOPHIL # BLD AUTO: 0.2 K/UL (ref 0–0.5)
EOSINOPHIL NFR BLD: 4.5 % (ref 0–8)
ERYTHROCYTE [DISTWIDTH] IN BLOOD BY AUTOMATED COUNT: 17.6 % (ref 11.5–14.5)
HCT VFR BLD AUTO: 27.7 % (ref 40–54)
HGB BLD-MCNC: 8.7 G/DL (ref 14–18)
IMM GRANULOCYTES # BLD AUTO: 0.01 K/UL (ref 0–0.04)
IMM GRANULOCYTES NFR BLD AUTO: 0.3 % (ref 0–0.5)
LYMPHOCYTES # BLD AUTO: 1.1 K/UL (ref 1–4.8)
LYMPHOCYTES NFR BLD: 28.4 % (ref 18–48)
MCH RBC QN AUTO: 25.8 PG (ref 27–31)
MCHC RBC AUTO-ENTMCNC: 31.4 G/DL (ref 32–36)
MCV RBC AUTO: 82 FL (ref 82–98)
MONOCYTES # BLD AUTO: 0.5 K/UL (ref 0.3–1)
MONOCYTES NFR BLD: 12.6 % (ref 4–15)
MYCOBACTERIUM SPEC QL CULT: NORMAL
NEUTROPHILS # BLD AUTO: 2 K/UL (ref 1.8–7.7)
NEUTROPHILS NFR BLD: 53.7 % (ref 38–73)
NRBC BLD-RTO: 0 /100 WBC
PLATELET # BLD AUTO: 146 K/UL (ref 150–450)
PMV BLD AUTO: 10.1 FL (ref 9.2–12.9)
POCT GLUCOSE: 101 MG/DL (ref 70–110)
POCT GLUCOSE: 109 MG/DL (ref 70–110)
POCT GLUCOSE: 114 MG/DL (ref 70–110)
POCT GLUCOSE: 116 MG/DL (ref 70–110)
POCT GLUCOSE: 155 MG/DL (ref 70–110)
RBC # BLD AUTO: 3.37 M/UL (ref 4.6–6.2)
WBC # BLD AUTO: 3.8 K/UL (ref 3.9–12.7)

## 2024-05-23 PROCEDURE — 25000003 PHARM REV CODE 250: Mod: HCNC | Performed by: ORTHOPAEDIC SURGERY

## 2024-05-23 PROCEDURE — 63600175 PHARM REV CODE 636 W HCPCS: Mod: HCNC | Performed by: ORTHOPAEDIC SURGERY

## 2024-05-23 PROCEDURE — 25000003 PHARM REV CODE 250: Mod: HCNC | Performed by: HOSPITALIST

## 2024-05-23 PROCEDURE — 85025 COMPLETE CBC W/AUTO DIFF WBC: CPT | Mod: HCNC | Performed by: ORTHOPAEDIC SURGERY

## 2024-05-23 PROCEDURE — 97166 OT EVAL MOD COMPLEX 45 MIN: CPT | Mod: HCNC

## 2024-05-23 PROCEDURE — 11000001 HC ACUTE MED/SURG PRIVATE ROOM: Mod: HCNC

## 2024-05-23 PROCEDURE — 36415 COLL VENOUS BLD VENIPUNCTURE: CPT | Mod: HCNC | Performed by: ORTHOPAEDIC SURGERY

## 2024-05-23 PROCEDURE — 97530 THERAPEUTIC ACTIVITIES: CPT | Mod: HCNC

## 2024-05-23 PROCEDURE — 25000003 PHARM REV CODE 250: Mod: HCNC | Performed by: STUDENT IN AN ORGANIZED HEALTH CARE EDUCATION/TRAINING PROGRAM

## 2024-05-23 PROCEDURE — 94760 N-INVAS EAR/PLS OXIMETRY 1: CPT | Mod: HCNC

## 2024-05-23 PROCEDURE — 97535 SELF CARE MNGMENT TRAINING: CPT | Mod: HCNC

## 2024-05-23 PROCEDURE — 99233 SBSQ HOSP IP/OBS HIGH 50: CPT | Mod: HCNC,,, | Performed by: STUDENT IN AN ORGANIZED HEALTH CARE EDUCATION/TRAINING PROGRAM

## 2024-05-23 PROCEDURE — 97161 PT EVAL LOW COMPLEX 20 MIN: CPT | Mod: HCNC

## 2024-05-23 PROCEDURE — 85730 THROMBOPLASTIN TIME PARTIAL: CPT | Mod: HCNC | Performed by: ORTHOPAEDIC SURGERY

## 2024-05-23 RX ORDER — DOXYCYCLINE HYCLATE 100 MG
100 TABLET ORAL EVERY 12 HOURS
Status: DISCONTINUED | OUTPATIENT
Start: 2024-05-23 | End: 2024-05-24 | Stop reason: HOSPADM

## 2024-05-23 RX ADMIN — OXYCODONE HYDROCHLORIDE AND ACETAMINOPHEN 1 TABLET: 5; 325 TABLET ORAL at 04:05

## 2024-05-23 RX ADMIN — SEVELAMER CARBONATE 800 MG: 800 TABLET, FILM COATED ORAL at 04:05

## 2024-05-23 RX ADMIN — OXYCODONE HYDROCHLORIDE AND ACETAMINOPHEN 1 TABLET: 5; 325 TABLET ORAL at 06:05

## 2024-05-23 RX ADMIN — LABETALOL HYDROCHLORIDE 100 MG: 100 TABLET, FILM COATED ORAL at 08:05

## 2024-05-23 RX ADMIN — Medication 1 CAPSULE: at 08:05

## 2024-05-23 RX ADMIN — AMLODIPINE BESYLATE 2.5 MG: 2.5 TABLET ORAL at 08:05

## 2024-05-23 RX ADMIN — ATORVASTATIN CALCIUM 80 MG: 40 TABLET, FILM COATED ORAL at 08:05

## 2024-05-23 RX ADMIN — FINASTERIDE 5 MG: 5 TABLET, FILM COATED ORAL at 08:05

## 2024-05-23 RX ADMIN — DEXTROSE MONOHYDRATE 1 G: 2.5 INJECTION INTRAVENOUS at 08:05

## 2024-05-23 RX ADMIN — SEVELAMER CARBONATE 800 MG: 800 TABLET, FILM COATED ORAL at 08:05

## 2024-05-23 RX ADMIN — APIXABAN 2.5 MG: 2.5 TABLET, FILM COATED ORAL at 09:05

## 2024-05-23 RX ADMIN — SEVELAMER CARBONATE 800 MG: 800 TABLET, FILM COATED ORAL at 11:05

## 2024-05-23 RX ADMIN — DOXYCYCLINE HYCLATE 100 MG: 100 TABLET, COATED ORAL at 09:05

## 2024-05-23 RX ADMIN — APIXABAN 2.5 MG: 2.5 TABLET, FILM COATED ORAL at 08:05

## 2024-05-23 RX ADMIN — ASPIRIN 81 MG: 81 TABLET, COATED ORAL at 08:05

## 2024-05-23 RX ADMIN — TAMSULOSIN HYDROCHLORIDE 0.8 MG: 0.4 CAPSULE ORAL at 08:05

## 2024-05-23 NOTE — PROGRESS NOTES
Vancomycin consult follow-up:    Patient reviewed, dialysis scheduled every Mon, Wed, Fri, no new levels, continue current therapy; Next levels due: random due 5/24/2024 at 0400

## 2024-05-23 NOTE — SUBJECTIVE & OBJECTIVE
Interval History:  Patient feels fine today without complaints.  No fevers or chills.    Review of Systems  Objective:     Vital Signs (Most Recent):  Temp: 97.8 °F (36.6 °C) (05/23/24 0733)  Pulse: 65 (05/23/24 0817)  Resp: 18 (05/23/24 0733)  BP: 136/65 (05/23/24 0817)  SpO2: 97 % (05/23/24 0733) Vital Signs (24h Range):  Temp:  [97.8 °F (36.6 °C)-99 °F (37.2 °C)] 97.8 °F (36.6 °C)  Pulse:  [64-82] 65  Resp:  [17-18] 18  SpO2:  [97 %-100 %] 97 %  BP: (128-169)/(59-78) 136/65     Weight: 79.8 kg (175 lb 14.8 oz)  Body mass index is 26.75 kg/m².    Intake/Output Summary (Last 24 hours) at 5/23/2024 0935  Last data filed at 5/23/2024 0917  Gross per 24 hour   Intake 600 ml   Output 3000 ml   Net -2400 ml         Physical Exam    Vitals reviewed.   Constitutional:       Appearance: He is ill-appearing (chronic).   HENT:      Mouth/Throat:      Mouth: Mucous membranes are moist.      Pharynx: Oropharynx is clear.   Cardiovascular:      Rate and Rhythm: Normal rate and regular rhythm.   Musculoskeletal:      Cervical back: Neck supple. No rigidity.   Skin:     General: Skin is warm and dry.   Neurological:      General: No focal deficit present.      Mental Status: He is alert and oriented to person, place, and time.    Significant Labs: All pertinent labs within the past 24 hours have been reviewed.    Significant Imaging: I have reviewed all pertinent imaging results/findings within the past 24 hours.

## 2024-05-23 NOTE — PROGRESS NOTES
[facility] - [place], [place] - Observation  Infectious Disease  Progress Note    Patient Name: Miguel Moore  MRN: [id number]  Admission Date: 5/10/2024  Length of Stay: 13 days  Attending Physician: Moises Coles III, MD  Primary Care Provider: Raciel Raymond MD    Isolation Status: No active isolations  Assessment/Plan:      ID  Subacute osteomyelitis of right foot  I independently reviewed patient's lab work and images as documented. 67 yo male with ESRD on HD, DM and CVA with R sided weakness with recent admission for R ankle OM (maintained on vanc/ancef x 6w, ruben 5/20) admitted for unhealing R wound. ID consulted for abx recs. Hospital course notable for blood cx with CONS, suspected contaminant, repeat blcx ngtd. Suspect poor wound healing due to decreased blood flow, s/p R angio on 5/16. Repeat R US with significant stenoses post-angio. Pt was evaluated by ortho, s/p washout on 5/21 - noted to have a deep abscess/purulent fluid, cx no growth to date.     Recommendations:  -given source control recommend empiric doxy PO for 14 d (ordered)  -wound care          Thank you for your consult. Will sign off, please call with questions, new culture data or clinical changes. Above d/w primary team.       Laney Underwood MD  Infectious Disease  [facility] - [place], [place] - Observation    Subjective:     Principal Problem:Open wound of right foot    HPI: 67 yo male with ESRD on HD, DM and CVA with R sided weakness with recent admission for R ankle OM (maintained on vanc/tara x 6w, ruben 5/20) admitted for unhealing R wound. ID consulted for abx recs. Hospital course notable for blood cx with CONS, suspected contaminant, repeat blcx ngtd. He is pending angio with vascular and evaluation by ortho for potential surgical disposition. Prior to admission, he denied fevers or chills.       Interval History: Reconsulted post-surgery. Pt reports feeling well. No acute issues.     Review of Systems   Constitutional:  Negative for chills and fever.   All other  systems reviewed and are negative.    Objective:     Vital Signs (Most Recent):  Temp: 97.8 °F (36.6 °C) (05/23/24 0733)  Pulse: 65 (05/23/24 0817)  Resp: 18 (05/23/24 0733)  BP: 136/65 (05/23/24 0817)  SpO2: 97 % (05/23/24 0733) Vital Signs (24h Range):  Temp:  [97.8 °F (36.6 °C)-99 °F (37.2 °C)] 97.8 °F (36.6 °C)  Pulse:  [64-82] 65  Resp:  [17-18] 18  SpO2:  [97 %-100 %] 97 %  BP: (128-169)/(59-78) 136/65     Weight: 79.8 kg (175 lb 14.8 oz)  Body mass index is 26.75 kg/m².    Estimated Creatinine Clearance: 9.4 mL/min (A) (based on SCr of 7.2 mg/dL (H)).     Physical Exam  Constitutional:       General: He is not in acute distress.     Appearance: He is not ill-appearing or toxic-appearing.   Pulmonary:      Effort: Pulmonary effort is normal. No respiratory distress.   Abdominal:      General: There is no distension.      Tenderness: There is no abdominal tenderness.   Musculoskeletal:      Right lower leg: No edema.      Left lower leg: No edema.   Skin:     Comments: Feet wrapped   Neurological:      Mental Status: He is alert and oriented to person, place, and time.          Significant Labs:   Microbiology Results (last 7 days)       Procedure Component Value Units Date/Time    Culture, Anaerobe [[id number]] Collected: 05/21/24 1731    Order Status: Completed Specimen: Incision site from Foot, Left Updated: 05/23/24 0748     Anaerobic Culture Culture in progress    Aerobic culture [[id number]] Collected: 05/21/24 1731    Order Status: Completed Specimen: Incision site from Foot, Left Updated: 05/23/24 0650     Aerobic Bacterial Culture No growth    AFB Culture & Smear [[id number]] Collected: 05/21/24 1731    Order Status: Sent Specimen: Incision site from Foot, Left Updated: 05/22/24 1118    Gram stain [[id number]] Collected: 05/21/24 1731    Order Status: Completed Specimen: Incision site from Foot, Left Updated: 05/22/24 0951     Gram Stain Result No WBC's      No organisms seen    Fungus culture  [[id number]] Collected: 05/21/24 1731    Order Status: Sent Specimen: Incision site from Foot, Left Updated: 05/21/24 0697            Significant Imaging: I have reviewed all pertinent imaging results/findings within the past 24 hours.

## 2024-05-23 NOTE — PLAN OF CARE
Problem: Occupational Therapy  Goal: Occupational Therapy Goal  Description: Goals to be met by: 6/6/2024     Patient will increase functional independence with ADLs by performing:    Feeding with Modified [place].  UE Dressing with Modified [place].  Grooming while seated at sink with Modified [place].  Toileting from bedside commode with Modified [place], Set-up Assistance, Supervision, and Assistive Devices as needed for hygiene and clothing management.   Rolling to Bilateral with Modified [place].   Supine to sit with Modified [place].  Scoot  transfers using a sliding board with Modified [place] and maintaining weight-bearing precaution(s).  Toilet transfer to bedside commode with Modified [place] and maintaining weight-bearing precaution(s).  Upper extremity exercise program x15 reps per handout, with independence.    Outcome: Progressing   The patient will benefit from LIT and caregiver assist

## 2024-05-23 NOTE — NURSING
[facility] [place]  Nurses Note -- 4 Eyes      5/23/2024       Skin assessed on: Q Shift      [] No Pressure Injuries Present    []Prevention Measures Documented    [x] Yes LDA  for Pressure Injury Previously documented     [] Yes New Pressure Injury Discovered   [] LDA for New Pressure Injury Added      Attending RN:  Kaela Connors LPN     Second RN:  Kelsi SaucedaRN

## 2024-05-23 NOTE — PT/OT/SLP EVAL
Physical Therapy Evaluation    Patient Name:  Miguel Moore   MRN:  [id number]    Recommendations:     Discharge Recommendations: Low Intensity Therapy (with caregiver support)   Discharge Equipment Recommendations: none   Barriers to discharge: None    Assessment:     Miguel Moore is a 68 y.o. male admitted with a medical diagnosis of Open wound of right foot.  He presents with the following impairments/functional limitations: weakness, impaired endurance, impaired sensation, impaired self care skills, impaired functional mobility, gait instability, impaired balance, decreased coordination, decreased upper extremity function, decreased lower extremity function, decreased safety awareness, abnormal tone, decreased ROM, impaired fine motor, impaired skin, impaired joint extensibility, impaired muscle length, orthopedic precautions.    Rehab Prognosis: Fair; patient would benefit from acute skilled PT services to address these deficits and reach maximum level of function.    Recent Surgery: Procedure(s) (LRB):  INCISION AND DRAINAGE, LOWER EXTREMITY (Right) 2 Days Post-Op    Plan:     During this hospitalization, patient to be seen 3 x/week to address the identified rehab impairments via therapeutic activities, therapeutic exercises, wheelchair management/training and progress toward the following goals:    Plan of Care Expires:  06/06/24    Subjective     Chief Complaint: N/A  Patient/Family Comments/goals: Pt felt better sitting EOB.  Pain/Comfort:  Pain Rating 1: 0/10      Living Environment:  Pt lives with fiance and dtr in a [facility] with ~2 small steps at entry.  Fiance is retired and dtr works from home.  Prior to admission, patients level of function was recently spending more time in the hospital bed 2* multiple wounds to BLE.  Prior to that pt was ambulating with a cane, functionally started to decline to w/c transfers and mobility 2* BLE wounds, and now mostly in bed.  Pt recently has ambulance  transportation to dialysis.  Equipment at home: bedside commode, shower chair, hospital bed, wheelchair with cushion, cane, straight, grab bar.  Upon discharge, patient will have assistance from family.    Objective:     Communicated with nurse Sherwood prior to session.  Patient found HOB elevated with peripheral IV, pressure relief boots  upon PT entry to room.    General Precautions: Standard, fall, diabetic, seizure (ESRD on HD MWF)  Orthopedic Precautions:LLE non weight bearing, RLE non weight bearing   Braces: N/A  Respiratory Status: Room air    Exams:  Cognitive Exam:  Patient was able to follow 2 step commands.   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    Supine to Sit: minimum assistance  Sit to Supine: minimum assistance  Balance: Pt with fair static sit balance.  Pt required L hand placement on siderail for support while sitting EOB.  Pt able to participate in ADL's while sitting EOB.       AM-PAC 6 CLICK MOBILITY  Total Score:10       Treatment & Education:  BLE seated therex as tolerated: hip flex, LAQ, and AP    Pt educated on acute skilled PT services and goals.  Pt educated on w/c transfers with slideboard.  Pt verbalized good understanding.     Patient left HOB elevated  and RUE elevated on pillow with all lines intact, call button in reach, and bed alarm on.  B pressure relief boots readjusted for pt.  Tray table close by.     GOALS:   Multidisciplinary Problems       Physical Therapy Goals          Problem: Physical Therapy    Goal Priority Disciplines Outcome Goal Variances Interventions   Physical Therapy Goal     PT, PT/OT Progressing     Description: Goals to be met by: 24     Patient will increase functional independence with mobility by performin. Supine to sit with Modified [place]  2. Sit to supine with Modified [place]  3. Rolling to Left and Right with Modified [place]  4. Bed to W/chair transfer with Moderate Assistance using Slideboard  5. Wheelchair propulsion x50 feet with Minimal Assistance using left upper extremity  6. Lower extremity exercise program 2 sets x15 reps per handout, with independence                         History:     Past Medical History:   Diagnosis Date    Allergy     Clotting disorder     Elaquis and APlavix    Deep vein thrombosis     Diabetes mellitus, type 2     Hypertension     Nuclear sclerosis of both eyes 2017    Renal disorder     Seizures     Stroke     Urinary tract infection        Past Surgical History:   Procedure Laterality Date    CATARACT EXTRACTION W/  INTRAOCULAR LENS IMPLANT Right 10/05/2017    Dr. Tay    CATARACT EXTRACTION W/  INTRAOCULAR LENS IMPLANT Left 10/19/2017    Dr. Tay    CYSTOSCOPY W/ RETROGRADES Bilateral 2021    Procedure: CYSTOSCOPY, WITH RETROGRADE PYELOGRAM;  Surgeon: JEMMA Styles MD;  Location: [facility] OR;  Service: Urology;  Laterality: Bilateral;  REQUESTING EARLY AS POSSIBE-LO / 2021 @ 1:13PM  RN Pre Op 21, Covid NEGATIVE ON  21.  C A    ESOPHAGOGASTRODUODENOSCOPY N/A 2020    Procedure: EGD (ESOPHAGOGASTRODUODENOSCOPY);  Surgeon: Desmond Chapman MD;  Location: [facility] ENDO;  Service: Endoscopy;  Laterality: N/A;    EYE SURGERY Bilateral      cataract    FEMORAL ARTERY STENT      FRACTURE SURGERY      INCISION AND DRAINAGE, LOWER EXTREMITY Right 4/4/2024    Procedure: INCISION AND DRAINAGE, LOWER EXTREMITY;  Surgeon: Jimbo Montilla III, MD;  Location: [facility] OR;  Service: Orthopedics;  Laterality: Right;    INCISION AND DRAINAGE, LOWER EXTREMITY Right 4/6/2024    Procedure: INCISION AND DRAINAGE, LOWER EXTREMITY;  Surgeon: Desmond Barillas MD;  Location: [facility] OR;  Service: Orthopedics;  Laterality: Right;  foot and ankle    INCISION AND DRAINAGE, LOWER EXTREMITY Right 4/9/2024    Procedure: INCISION AND DRAINAGE, LOWER EXTREMITY- FOOT & ANKLE;  Surgeon: Jimbo Montilla III, MD;  Location: [facility] OR;  Service: Orthopedics;  Laterality: Right;  CULTURES, VASCHE    INCISION AND DRAINAGE, LOWER EXTREMITY Right 5/21/2024    Procedure: INCISION AND DRAINAGE, LOWER EXTREMITY;  Surgeon: Jimbo Montilla III, MD;  Location: [facility] OR;  Service: Orthopedics;  Laterality: Right;  Pulsavac and Vashe    KNEE SURGERY      bilateral scope    WOUND DRESSING Right 4/9/2024    Procedure: WOUND VAC EXCHANGE;  Surgeon: Jimbo Montilla III, MD;  Location: [facility] OR;  Service: Orthopedics;  Laterality: Right;       Time Tracking:     PT Received On: 05/23/24  PT Start Time: 1053     PT Stop Time: 1123  PT Total Time (min): 30 min     Billable Minutes: Evaluation 22 min co-eval with OT and Therapeutic Activity 8 min      05/23/2024

## 2024-05-23 NOTE — PLAN OF CARE
Problem: Adult Inpatient Plan of Care  Goal: Plan of Care Review  5/23/2024 1620 by Kaela Connors LPN  Outcome: Progressing     Problem: Infection  Goal: Absence of Infection Signs and Symptoms  Outcome: Progressing     Problem: Diabetes Comorbidity  Goal: Blood Glucose Level Within Targeted Range  Outcome: Progressing     Problem: Wound  Goal: Optimal Coping  5/23/2024 1620 by Kaela Connors LPN  Outcome: Progressing    Goal: Skin Health and Integrity  Outcome: Progressing  Intervention: Promote Wound Healing  Flowsheets (Taken 5/23/2024 1620)  Sleep/Rest Enhancement: relaxation techniques promoted     Problem: Skin Injury Risk Increased  Goal: Skin Health and Integrity  5/23/2024 1620 by Kaela Connors LPN  Outcome: Progressing    Problem: Fall Injury Risk  Goal: Absence of Fall and Fall-Related Injury  5/23/2024 1620 by Kaela Connors LPN  Outcome: Progressing

## 2024-05-23 NOTE — PLAN OF CARE
[facility] - [place], [place]      HOME HEALTH ORDERS  FACE TO FACE ENCOUNTER    Patient Name: Miguel Moore  YOB: 1955    PCP: Raciel Raymond MD   PCP Address: [address] / [id number]  PCP Phone Number: [phone]  PCP Fax: [phone]    Encounter Date: 5/10/24    Admit to Home Health    Diagnoses:  Active Hospital Problems    Diagnosis  POA    *Open wound of right foot [S91.301A]  Yes    Subacute osteomyelitis of right foot [M86.271]  Yes    Atherosclerosis of native artery of right lower extremity with ulceration [I70.239]  Yes    Peripheral artery disease [I73.9]  Yes     3/20/24 RLE arterial US Arterial vasculature of the right lower extremity is patent.  However, there is evidence of atherosclerotic change with stenosis at the level of popliteal artery.       Paroxysmal atrial fibrillation [I48.0]  Yes     Chronic     3/20/24 TTE LV normal systolic function LVEF 55-60%.  Converted to sinus rhythm on amiodarone   Amiodarone stopped on hospital discharge due to possible contributor to transaminitis      Anemia in chronic kidney disease [N18.9, D63.1]  Yes     Chronic    Type 2 diabetes mellitus with diabetic neuropathy, with long-term current use of insulin [E11.40, Z79.4]  Not Applicable     Chronic    Secondary hyperparathyroidism of renal origin [N25.81]  Yes    ESRD (end stage renal disease) [N18.6]  Yes     Chronic     2/27/20 renal US with dopplers no ALF.  Medical renal disease  8/2020 renal US: 1. Symmetric diffusely increased cortical echogenicity and elevated resistive indices, nonspecific indicators of chronic medical renal disease.  2. Prostatomegaly.  Some of the provided images are suggestive of a distinct hyperechoic component of the prostate gland, of uncertain etiology or significance, and potentially artifactual.  Dedicated prostate ultrasound or MRI may be of benefit for further characterization.    Started on HD while hospitalized for progression to ESRD  8/2020  -Continue dialysis per nephrology  -Outpatient HD has been arranged  -Had planned discharge 8/27 if remained afebrile and cultures negative.  Unfortunately his blood culture grew Candida parapsilosis  -Dr. Gomez with ID consulted and case discussed with him.  Started micafungin 8/27 and now on fluconazole.  -Dialysis line removed after HD 8/28 and plan line holiday over weekend.  -new line by IR on 8/31, tolerated dialysis fluid.  -continue outpatient dialysis.      Benign essential HTN [I10]  Yes     Chronic     01/06/2021 TTE mild left atrial enlargement.  LV normal size and systolic function LVEF 55%.  Indeterminate diastolic function.  Mild right atrial enlargement.  Normal RV systolic function.  Normal RV size.  PA pressure 20.  Normal CVP.  Three.      BPH (benign prostatic hyperplasia) 2/18/21 prostate bx normal [N40.0]  Yes     Chronic     6/26/2017 renal US mod prostatomegaly.      Dyslipidemia [E78.5]  Yes     Chronic    Hemiplegia and hemiparesis following cerebral infarction affecting right dominant side [I69.351]  Not Applicable     Chronic      Resolved Hospital Problems    Diagnosis Date Resolved POA    Controlled type 2 diabetes mellitus with chronic kidney disease on chronic dialysis, with long-term current use of insulin [E11.22, N18.6, Z79.4, Z99.2] 05/10/2024 Not Applicable     Chronic       Follow Up Appointments:  Future Appointments   Date Time Provider Department Center   6/4/2024  1:00 PM Amelia Sofia NP [facility]   6/6/2024  8:45 AM Sabi Douglas, DPM [facility]       Allergies:  Review of patient's allergies indicates:   Allergen Reactions    Ace inhibitors      Hyperkalemia 8/2018  Other reaction(s): Unknown    Penicillins Hives     Tolerated cefepime and cefdinir previously    Tizanidine      Felt hot       Medications: Review discharge medications with patient and family and provide education.    Current Facility-Administered Medications    Medication Dose Route Frequency Provider Last Rate Last Admin    acetaminophen tablet 650 mg  650 mg Oral Q6H PRN Ajay Baxter Jr., NP        aluminum-magnesium hydroxide-simethicone 200-200-20 mg/5 mL suspension 30 mL  30 mL Oral QID PRN Ajay Baxter Jr., NP        amLODIPine tablet 2.5 mg  2.5 mg Oral Daily Jimbo Montilla III, MD   2.5 mg at 05/23/24 0817    apixaban tablet 2.5 mg  2.5 mg Oral BID Moises Coles III, MD   2.5 mg at 05/23/24 0849    aspirin EC tablet 81 mg  81 mg Oral Daily Ajay Baxter Jr., NP   81 mg at 05/23/24 0817    atorvastatin tablet 80 mg  80 mg Oral Daily Patricia Reddy MD   80 mg at 05/23/24 0817    bisacodyL suppository 10 mg  10 mg Rectal Daily PRN Jimbo Montilla III, MD   10 mg at 05/12/24 0634    dextrose 10% bolus 125 mL 125 mL  12.5 g Intravenous PRN Ajay Baxter Jr., NP        dextrose 10% bolus 250 mL 250 mL  25 g Intravenous PRN Ajay Baxter Jr., NP        doxycycline tablet 100 mg  100 mg Oral Q12H Laney Underwood MD        epoetin marisol-epbx injection 10,000 Units  10,000 Units Subcutaneous Every Mon, Wed, Fri Jimbo Montilla III, MD   10,000 Units at 05/22/24 0851    finasteride tablet 5 mg  5 mg Oral Daily Ajay Baxter Jr., NP   5 mg at 05/23/24 0817    glucagon (human recombinant) injection 1 mg  1 mg Intramuscular PRN Ajay Baxter Jr., NP        glucose chewable tablet 16 g  16 g Oral PRN Ajay Baxter Jr., NP        glucose chewable tablet 24 g  24 g Oral PRN Ajay Baxter Jr., NP        insulin aspart U-100 pen 0-10 Units  0-10 Units Subcutaneous QID (AC + HS) PRN Ajay Baxter Jr., NP   2 Units at 05/19/24 1650    labetaloL tablet 100 mg  100 mg Oral Daily Ajay Baxter Jr., NP   100 mg at 05/23/24 0817    melatonin tablet 6 mg  6 mg Oral Nightly PRN Ajay Baxter Jr., NP   6 mg at 05/19/24 2158    naloxone 0.4 mg/mL injection 0.02 mg  0.02 mg Intravenous PRN Ajay Baxter Jr., NP        ondansetron injection 4 mg  4  mg Intravenous Q8H PRN Ajay Baxter Jr., SHAYLA        oxyCODONE-acetaminophen 5-325 mg per tablet 1 tablet  1 tablet Oral Q8H PRN Jimbo Montilla III, MD   1 tablet at 05/23/24 0610    polyethylene glycol packet 17 g  17 g Oral BID Jimbo Montilla III, MD   17 g at 05/22/24 2033    prochlorperazine injection Soln 5 mg  5 mg Intravenous Q6H PRN Ajay Baxter Jr., NP        sevelamer carbonate tablet 800 mg  800 mg Oral TID WM Ajay Batxer Jr., NP   800 mg at 05/23/24 1147    simethicone chewable tablet 80 mg  1 tablet Oral QID PRN Ajay Baxter Jr., NP        sodium chloride 0.9% bolus 250 mL 250 mL  250 mL Intravenous PRN Jimbo Montilla III, MD        sodium chloride 0.9% flush 10 mL  10 mL Intravenous Q8H PRN Aajy Baxter Jr., NP        tamsulosin 24 hr capsule 0.8 mg  2 capsule Oral Daily Ajay Baxter Jr., NP   0.8 mg at 05/23/24 0817    vitamin renal formula (B-complex-vitamin c-folic acid) 1 mg per capsule 1 capsule  1 capsule Oral Daily Ajay Baxter Jr., NP   1 capsule at 05/23/24 0817     Current Discharge Medication List        CONTINUE these medications which have NOT CHANGED    Details   apixaban (ELIQUIS) 5 mg Tab Take 1 tablet (5 mg total) by mouth 2 (two) times daily.  Qty: 180 tablet, Refills: 3    Associated Diagnoses: Acute deep vein thrombosis (DVT) of popliteal vein of right lower extremity      aspirin (ECOTRIN) 81 MG EC tablet Take 1 tablet (81 mg total) by mouth once daily.  Qty: 90 tablet, Refills: 3    Comments: Stop plavix  Associated Diagnoses: History of stroke      ferrous gluconate (FERGON) 324 MG tablet Take 1 tablet (324 mg total) by mouth 2 (two) times daily with meals.  Qty: 60 tablet, Refills: 11      finasteride (PROSCAR) 5 mg tablet Take 1 tablet (5 mg total) by mouth once daily.  Qty: 90 tablet, Refills: 3    Associated Diagnoses: Benign non-nodular prostatic hyperplasia without lower urinary tract symptoms      iron sucrose in NS (VENOFER) 100 mg/100 mL PgBk  50 mg.      labetaloL (NORMODYNE) 100 MG tablet Take 1 tablet (100 mg total) by mouth once daily.  Qty: 90 tablet, Refills: 3    Comments: .  Associated Diagnoses: Benign essential HTN      methoxy peg-epoetin beta (MIRCERA INJ) 75 mcg.      mupirocin (BACTROBAN) 2 % ointment Apply to affected area 3 times daily  Qty: 22 g, Refills: 1    Associated Diagnoses: Nail abnormality      oxyCODONE-acetaminophen (PERCOCET)  mg per tablet Take 1 tablet by mouth every 6 (six) hours as needed for Pain.  Qty: 10 tablet, Refills: 0    Comments: Quantity prescribed more than 7 day supply? No  Associated Diagnoses: Leg abscess      RENVELA 800 mg Tab Take 1 tablet (800 mg total) by mouth 3 (three) times daily with meals.  Qty: 90 tablet, Refills: 0    Associated Diagnoses: ESRD (end stage renal disease)      tamsulosin (FLOMAX) 0.4 mg Cap Take 2 capsules (0.8 mg total) by mouth once daily.  Qty: 180 capsule, Refills: 3    Associated Diagnoses: Benign non-nodular prostatic hyperplasia without lower urinary tract symptoms      vitamin renal formula, B-complex-vitamin c-folic acid, (NEPHROCAP) 1 mg Cap Take 1 capsule by mouth once daily.  Qty: 30 capsule, Refills: 11           STOP taking these medications       dextrose 5 % in water (D5W) PgBk 50 mL with ceFAZolin 1 gram SolR Comments:   Reason for Stopping:         vancomycin HCl (VANCOMYCIN 500 MG/100 ML D5W, READY TO MIX,) Comments:   Reason for Stopping:                 I have seen and examined this patient within the last 30 days. My clinical findings that support the need for the home health skilled services and home bound status are the following:no   Weakness/numbness causing balance and gait disturbance due to Infection making it taxing to leave home.     Diet:   renal diet      Referrals/ Consults  Physical Therapy to evaluate and treat. Evaluate for home safety and equipment needs; Establish/upgrade home exercise program. Perform / instruct on therapeutic exercises,  gait training, transfer training, and Range of Motion.  Occupational Therapy to evaluate and treat. Evaluate home environment for safety and equipment needs. Perform/Instruct on transfers, ADL training, ROM, and therapeutic exercises.   to evaluate for community resources/long-range planning.  Aide to provide assistance with personal care, ADLs, and vital signs.    Activities:   activity as tolerated    Nursing:   Agency to admit patient within 24 hours of hospital discharge unless specified on physician order or at patient request    SN to complete comprehensive assessment including routine vital signs. Instruct on disease process and s/s of complications to report to MD. Review/verify medication list sent home with the patient at time of discharge  and instruct patient/caregiver as needed. Frequency may be adjusted depending on start of care date.     Skilled nurse to perform up to 3 visits PRN for symptoms related to diagnosis    Ok to schedule additional visits based on staff availability and patient request on consecutive days within the home health episode.    When multiple disciplines ordered:    Start of Care occurs on Sunday - Wednesday schedule remaining discipline evaluations as ordered on separate consecutive days following the start of care.    Thursday SOC -schedule subsequent evaluations Friday and Monday the following week.     Friday - Saturday SOC - schedule subsequent discipline evaluations on consecutive days starting Monday of the following week.      Miscellaneous   Routine Skin for Bedridden Patients: Instruct patient/caregiver to apply moisture barrier cream to all skin folds and wet areas in perineal area daily and after baths and all bowel movements.    Home Health Aide:  Nursing Three times weekly, Physical Therapy Three times weekly, Occupational Therapy Three times weekly, Medical Social Work Three times weekly, and Home Health Aide Three times weekly    Wound Care  Orders  yes:     Cleanse wounds with Vashe.   Fill plantar foot wound with Aquacel Ag hydrofiber cut into ribbon- wick of dressing exiting wound for removal. Fill center of wound with Aquacel Ag hydrofiber. Cover lateral foot/ankle with hydrofiber.  Apply dry gauze over eschar on heel and plantar foot. Wrapped with 2 rolls of Kerlix. Replace EHOB boot.  I certify that this patient is confined to his home and needs intermittent skilled nursing care, physical therapy, and occupational therapy.

## 2024-05-23 NOTE — PROGRESS NOTES
[facility] - Observation  Wound Care  [facility]    Patient Name:  Miguel Moore   MRN:  [id number]  Date: 5/23/2024  Diagnosis: Open wound of right foot    History:     Past Medical History:   Diagnosis Date    Allergy     Clotting disorder     Elaquis and APlavix    Deep vein thrombosis     Diabetes mellitus, type 2     Hypertension     Nuclear sclerosis of both eyes 6/9/2017    Renal disorder     Seizures     Stroke     Urinary tract infection        Social History     Socioeconomic History    Marital status: Single   Occupational History    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, etc   Tobacco Use    Smoking status: Never    Smokeless tobacco: Never   Substance and Sexual Activity    Alcohol use: No    Drug use: No   Social History Narrative    Lives with daughter       Precautions:     Allergies as of 05/10/2024 - Reviewed 05/10/2024   Allergen Reaction Noted    Ace inhibitors  09/03/2018    Penicillins Hives 01/13/2015    Tizanidine  06/30/2020       [facility] Assessment Details/Treatment     Active Problem List with Overview Notes    Diagnosis Date Noted    Subacute osteomyelitis of right foot 05/13/2024    Atherosclerosis of native artery of right lower extremity with ulceration 05/13/2024    Open wound of right foot 05/10/2024    Peripheral artery disease 04/25/2024     3/20/24 RLE arterial US Arterial vasculature of the right lower extremity is patent.  However, there is evidence of atherosclerotic change with stenosis at the level of popliteal artery.       Abdominal aortic atherosclerosis on CT 4/1/24 04/25/2024    Open wound 03/26/2024    Paroxysmal atrial fibrillation 03/20/2024     3/20/24 TTE LV normal systolic function LVEF 55-60%.  Converted to sinus rhythm on amiodarone   Amiodarone stopped on hospital discharge due to possible contributor to transaminitis      Bilateral posterior capsular opacification 05/02/2023    Pseudophakia 01/06/2022    History of diabetic ulcer of foot     Elevated  PSA 2/18/21 prostate bx normal 02/18/2021 2/18/21 prostate bx normal  1/25/24 MRI prostate PIRADS 2      Pulmonary nodule 1/2021 4 mm nodule. 01/06/2021 1/6/2021 CT abd/pelvis: 4 mm nodule in the right middle lobe      Chronic deep vein thrombosis (DVT) of popliteal vein of right lower extremity 9/21/20 outside US; unprovoked; anticoagulation 09/21/2020    History of fungal infection candida parasilosis hospitalization 8/2020 08/29/2020     -Found to have candidemia - source unclear  -infectious disease consulted, Started on micafungin and now converted to fluconazole  -Repeat cultures negative so far  -Dialysis line removed 8/28.   line replaced on 08/31 after line holiday.  -end date of fluconazole will be 09/11/2020.  Patient has ophthalmology follow-up scheduled on 09/08/2020. Tentative end date 9/11/2020 If no endophthalmitis. If noted to have endophthalmitis during optho visit, would need at least 4-6 weeks of treatment      Anemia in chronic kidney disease 08/26/2020    Type 2 diabetes mellitus with diabetic neuropathy, with long-term current use of insulin 05/10/2018    History of stroke 12/04/2017    CME (cystoid macular edema), bilateral 11/16/2017    Refractive error 11/16/2017    Senile nuclear sclerosis 10/05/2017    Right sided weakness 09/11/2017    Secondary hyperparathyroidism of renal origin 08/03/2017    Vitamin D deficiency 08/03/2017    Nuclear sclerosis, left 06/09/2017    Cortical cataract of both eyes 06/09/2017    DM type 2 without retinopathy 06/09/2017    Microcytosis 9/2019 labs c/w AoCD and AoCKD 03/22/2017     Seen on admission as well 2015; normal Hb, low MCV.  Normal RDW  08/17/2020 EGD normal esophagus.  Discolored nodular and texture change mucosa in the antrum.  Normal duodenum.  Path with foveolar hyperplasia and early xanthoma formation.  H pylori negative.  Mild chronic inflammation without acute activity      ESRD (end stage renal disease) 03/22/2017 2/27/20 renal  US with dopplers no ALF.  Medical renal disease  8/2020 renal US: 1. Symmetric diffusely increased cortical echogenicity and elevated resistive indices, nonspecific indicators of chronic medical renal disease.  2. Prostatomegaly.  Some of the provided images are suggestive of a distinct hyperechoic component of the prostate gland, of uncertain etiology or significance, and potentially artifactual.  Dedicated prostate ultrasound or MRI may be of benefit for further characterization.    Started on HD while hospitalized for progression to ESRD 8/2020  -Continue dialysis per nephrology  -Outpatient HD has been arranged  -Had planned discharge 8/27 if remained afebrile and cultures negative.  Unfortunately his blood culture grew Candida parapsilosis  -Dr. Gomez with ID consulted and case discussed with him.  Started micafungin 8/27 and now on fluconazole.  -Dialysis line removed after HD 8/28 and plan line holiday over weekend.  -new line by IR on 8/31, tolerated dialysis fluid.  -continue outpatient dialysis.      Benign essential HTN 03/21/2017 01/06/2021 TTE mild left atrial enlargement.  LV normal size and systolic function LVEF 55%.  Indeterminate diastolic function.  Mild right atrial enlargement.  Normal RV systolic function.  Normal RV size.  PA pressure 20.  Normal CVP.  Three.      Dyslipidemia 03/21/2017    BPH (benign prostatic hyperplasia) 2/18/21 prostate bx normal 03/21/2017 6/26/2017 renal US mod prostatomegaly.      Seizure disorder as sequela of cerebrovascular accident 03/21/2017    Hemiplegia and hemiparesis following cerebral infarction affecting right dominant side 03/21/2017      Provided nursing with [employer] for wound care to right plantar foot.   Plan: Nursing to perform dressing changes daily.    05/23/2024

## 2024-05-23 NOTE — PT/OT/SLP EVAL
Occupational Therapy Evaluation and Treatment    Name: Miguel Moore  MRN: [id number]  Admitting Diagnosis: Open wound of right foot  Recent Surgery: Procedure(s) (LRB):  INCISION AND DRAINAGE, LOWER EXTREMITY (Right) 2 Days Post-Op    Recommendations:     Discharge Recommendations: Low Intensity Therapy (with caregiver assist)  Level of Assistance Recommended: Intermittent assistance  Discharge Equipment Recommendations: bedside commode  Barriers to discharge:  (The patient is NWB to BLE and will require assist)    Assessment:     Miguel Moore is a 68 y.o. male with a medical diagnosis of Open wound of right foot. He presents with performance deficits affecting function including weakness, impaired endurance, impaired sensation, impaired self care skills, impaired functional mobility, impaired balance, decreased coordination, decreased upper extremity function, decreased lower extremity function, decreased safety awareness, decreased ROM, impaired fine motor, orthopedic precautions, edema, impaired skin.     Rehab Prognosis: Good and Fair; patient would benefit from acute OT services to address these deficits and reach maximum level of function.    Plan:     Patient to be seen 3 x/week to address the above listed problems via self-care/home management, therapeutic activities, therapeutic exercises  Plan of Care Expires: 06/06/24  Plan of Care Reviewed with: patient    Subjective     Chief Complaint: is not allowed to WB on BLE  Patient Comments/Goals: agreeable to OT  Pain/Comfort:  Pain Rating 1: 0/10    Patients cultural, spiritual, Adventist conflicts given the current situation: no    Social History:  Living Environment: Patient lives with their daughter in a single story home with walk-in shower and grab bars  Prior Level of Function: Prior to admission, patient was modified independent with ADLs using W/C for mobility  Roles and Routines: Patient was  using ambulance for transportation th HD  prior to  admission.  Equipment Used at Home: wheelchair, hospital bed, shower chair, grab bar, cane, straight  DME owned (not currently used): none  Assistance Upon Discharge: family    Objective:     Communicated with nurseSaranya prior to session. Patient found HOB elevated with peripheral IV, pressure relief boots upon OT entry to room.    General Precautions: Standard, fall, diabetic, seizure   Orthopedic Precautions: LLE non weight bearing, RLE non weight bearing   Braces: N/A    Respiratory Status: Room air    Occupational Performance    Gait belt applied - No    Bed Mobility:   Scooting to HOB in supine: stand by assistance  Supine to sit from right side of bed with minimum assistance  Sit to Supine with minimum assistance on right side of bed    Functional Mobility/Transfers:  Functional Mobility: The patient sat on the EOB with SBA/(S) with use of left hand on bed rail for support. The patient was able to release bed rail to perform self care tasks while seated on the EOB    Activities of Daily Living:  Grooming: contact guard assistance and minimum assistance. Assist was need to remove caps from paste and mouthwash. VC/min assist to use the right hand as a functional assist      Cognitive/Visual Perceptual:  Cognitive/Psychosocial Skills:    -     Oriented to: Person, Place, and Situation  -     Follows Commands/attention: Follows two-step commands  -     Communication: clear/fluent  -     Memory: No Deficits noted  -     Safety awareness/insight to disability: impaired  -     Mood/Affect/Coping skills/emotional control: Appropriate to situation  Visual/Perceptual:    -     Intact    Physical Exam:  Balance:    -     Sitting: supervision and stand by assistance  Postural examination/scapula alignment:    -       Rounded shoulders  -       Forward head  Skin integrity: B heel wounds and groin wound covered by dressing  Edema:  RUE forearm  Sensation:    -       Impaired  light/touch RUE  Dominant hand: RUE non  functional 2* old CVA, uses left hand  Upper Extremity Range of Motion:     -       Right Upper Extremity: limited 2* stroke, minimal shoulder ROM, elbow and hand contracture  -       Left Upper Extremity: WFL  Upper Extremity Strength:    -       Right Upper Extremity: slight shoulder only  -       Left Upper Extremity: WFL   Strength:    -       Left Upper Extremity: WFL  Fine Motor Coordination: intact in the left hand only    AMPAC 6 Click ADL:  AMPA Total Score: 17    Treatment & Education:  Patient educated on role of OT, POC, and goals for therapy  Performed self care and functional mobility as noted above      Patient left HOB elevated with all lines intact and call button in reach.    GOALS:   Multidisciplinary Problems       Occupational Therapy Goals          Problem: Occupational Therapy    Goal Priority Disciplines Outcome Interventions   Occupational Therapy Goal     OT, PT/OT Progressing    Description: Goals to be met by: 6/6/2024     Patient will increase functional independence with ADLs by performing:    Feeding with Modified [place].  UE Dressing with Modified [place].  Grooming while seated at sink with Modified [place].  Toileting from bedside commode with Modified [place], Set-up Assistance, Supervision, and Assistive Devices as needed for hygiene and clothing management.   Rolling to Bilateral with Modified [place].   Supine to sit with Modified [place].  Scoot  transfers using a sliding board with Modified [place] and maintaining weight-bearing precaution(s).  Toilet transfer to bedside commode with Modified [place] and maintaining weight-bearing precaution(s).  Upper extremity exercise program x15 reps per handout, with independence.                         History:     Past Medical History:   Diagnosis Date    Allergy     Clotting disorder     Elaquis and APlavix    Deep vein thrombosis     Diabetes mellitus, type 2     Hypertension     Nuclear  sclerosis of both eyes 6/9/2017    Renal disorder     Seizures     Stroke     Urinary tract infection          Past Surgical History:   Procedure Laterality Date    CATARACT EXTRACTION W/  INTRAOCULAR LENS IMPLANT Right 10/05/2017    Dr. Tay    CATARACT EXTRACTION W/  INTRAOCULAR LENS IMPLANT Left 10/19/2017    Dr. Tay    CYSTOSCOPY W/ RETROGRADES Bilateral 2/18/2021    Procedure: CYSTOSCOPY, WITH RETROGRADE PYELOGRAM;  Surgeon: JEMMA Styles MD;  Location: [facility] OR;  Service: Urology;  Laterality: Bilateral;  REQUESTING EARLY AS POSSIBE-LO / 2/8/2021 @ 1:13PM  RN Pre Op 2-11-21, Covid NEGATIVE ON  2-17-21.  C A    ESOPHAGOGASTRODUODENOSCOPY N/A 8/17/2020    Procedure: EGD (ESOPHAGOGASTRODUODENOSCOPY);  Surgeon: Desmond Chapman MD;  Location: [facility];  Service: Endoscopy;  Laterality: N/A;    EYE SURGERY Bilateral     cataract    FEMORAL ARTERY STENT      FRACTURE SURGERY      INCISION AND DRAINAGE, LOWER EXTREMITY Right 4/4/2024    Procedure: INCISION AND DRAINAGE, LOWER EXTREMITY;  Surgeon: Jimbo Montilla III, MD;  Location: [facility] OR;  Service: Orthopedics;  Laterality: Right;    INCISION AND DRAINAGE, LOWER EXTREMITY Right 4/6/2024    Procedure: INCISION AND DRAINAGE, LOWER EXTREMITY;  Surgeon: Desmond Barillas MD;  Location: [facility] OR;  Service: Orthopedics;  Laterality: Right;  foot and ankle    INCISION AND DRAINAGE, LOWER EXTREMITY Right 4/9/2024    Procedure: INCISION AND DRAINAGE, LOWER EXTREMITY- FOOT & ANKLE;  Surgeon: Jimbo Montilla III, MD;  Location: [facility] OR;  Service: Orthopedics;  Laterality: Right;  CULTURES, VASCHE    INCISION AND DRAINAGE, LOWER EXTREMITY Right 5/21/2024    Procedure: INCISION AND DRAINAGE, LOWER EXTREMITY;  Surgeon: Jimbo Montilla III, MD;  Location: [facility] OR;  Service: Orthopedics;  Laterality: Right;  Pulsavac and Vashe    KNEE SURGERY      bilateral scope    WOUND DRESSING Right 4/9/2024    Procedure: WOUND VAC EXCHANGE;  Surgeon: Jimbo Montilla  MD ERNESTINE;  Location: [facility];  Service: Orthopedics;  Laterality: Right;       Time Tracking:     OT Date of Treatment: 05/23/24  OT Start Time: 1054  OT Stop Time: 1123  OT Total Time (min): 29 min    Billable Minutes: Evaluation 15 (with PT) and Self Care/Home Management 14    5/23/2024

## 2024-05-23 NOTE — SUBJECTIVE & OBJECTIVE
Interval History: Reconsulted post-surgery. Pt reports feeling well. No acute issues.     Review of Systems   Constitutional:  Negative for chills and fever.   All other systems reviewed and are negative.    Objective:     Vital Signs (Most Recent):  Temp: 97.8 °F (36.6 °C) (05/23/24 0733)  Pulse: 65 (05/23/24 0817)  Resp: 18 (05/23/24 0733)  BP: 136/65 (05/23/24 0817)  SpO2: 97 % (05/23/24 0733) Vital Signs (24h Range):  Temp:  [97.8 °F (36.6 °C)-99 °F (37.2 °C)] 97.8 °F (36.6 °C)  Pulse:  [64-82] 65  Resp:  [17-18] 18  SpO2:  [97 %-100 %] 97 %  BP: (128-169)/(59-78) 136/65     Weight: 79.8 kg (175 lb 14.8 oz)  Body mass index is 26.75 kg/m².    Estimated Creatinine Clearance: 9.4 mL/min (A) (based on SCr of 7.2 mg/dL (H)).     Physical Exam  Constitutional:       General: He is not in acute distress.     Appearance: He is not ill-appearing or toxic-appearing.   Pulmonary:      Effort: Pulmonary effort is normal. No respiratory distress.   Abdominal:      General: There is no distension.      Tenderness: There is no abdominal tenderness.   Musculoskeletal:      Right lower leg: No edema.      Left lower leg: No edema.   Skin:     Comments: Feet wrapped   Neurological:      Mental Status: He is alert and oriented to person, place, and time.          Significant Labs:   Microbiology Results (last 7 days)       Procedure Component Value Units Date/Time    Culture, Anaerobe [[id number]] Collected: 05/21/24 1731    Order Status: Completed Specimen: Incision site from Foot, Left Updated: 05/23/24 0748     Anaerobic Culture Culture in progress    Aerobic culture [[id number]] Collected: 05/21/24 1731    Order Status: Completed Specimen: Incision site from Foot, Left Updated: 05/23/24 0650     Aerobic Bacterial Culture No growth    AFB Culture & Smear [[id number]] Collected: 05/21/24 1731    Order Status: Sent Specimen: Incision site from Foot, Left Updated: 05/22/24 1118    Gram stain [[id number]] Collected: 05/21/24 1733     Order Status: Completed Specimen: Incision site from Foot, Left Updated: 05/22/24 0951     Gram Stain Result No WBC's      No organisms seen    Fungus culture [[id number]] Collected: 05/21/24 173    Order Status: Sent Specimen: Incision site from Foot, Left Updated: 05/21/24 9640            Significant Imaging: I have reviewed all pertinent imaging results/findings within the past 24 hours.

## 2024-05-23 NOTE — PLAN OF CARE
CM met with pt. Pt decided that he did not want an amputation. Pt instead had a drainage and incision procedure done. Pt continue to have dialysis 3 days per week while in the hospital.     PT/OT has recommended the pt receives low intensity therapy.     Pt stated that he previously had Home Health Services with [facility] and he would like to use them for his services. CM sent a referral to [facility].     CM will continue to follow-up as needed.      05/23/24 0065   Discharge Reassessment   Assessment Type Discharge Planning Reassessment   Did the patient's condition or plan change since previous assessment? Yes   Discharge Plan discussed with: Patient   Discharge Plan A Home with family   Discharge Plan B Other  (TBD)   DME Needed Upon Discharge  none   Transition of Care Barriers None   Why the patient remains in the hospital Requires continued medical care   Post-Acute Status   Coverage [facility] [employer] Medicare   Discharge Delays None known at this time

## 2024-05-23 NOTE — PROGRESS NOTES
[facility] Medicine  Progress Note    Patient Name: Miguel Moore  MRN: [id number]  Patient Class: IP- Inpatient   Admission Date: 5/10/2024  Length of Stay: 13 days  Attending Physician: Moises Coles III, MD  Primary Care Provider: Raciel Raymond MD        Subjective:     Principal Problem:Open wound of right foot        HPI:  68 y.o. male with hypertension, BPH, chronic right lower extremity DVT, DM2, dyslipidemia, ESRD on HD, hemiplegia and hemiparesis following cerebral infarction affecting the right dominant side, paroxysmal AFib, seizure disorder as sequela of CVA sent to the ED from wound care for new wound infection.  Denies fever, chills, cough, shortness breath, chest pain, dizziness, syncope.  Was hospitalized in March for cellulitis and abscess to the right foot with surgical intervention at that time.  In the ED today, CRP and procalcitonin elevated.  Blood and wound cultures were obtained.  IV antibiotics initiated.    Overview/Hospital Course:  69yo M ESRD, PAD, R foot wounds s/p multiple surgeries admitted w worsening wound. Did have Staph epi in blood culture, likely contaminant. He does not want further debridement, wants BKA if needed. Started vancomycin and meropenem given his history of ESBL organisms. Ortho [facility] and [facility] consulted. Vascular consulted- plan for RLE angiogram on 05/16 . Nephro following for HD. He also has L heel dark area, XR left heel with Osseous structures demonstrate no evidence for acute fracture or osseous destructive lesion.  Mild diffuse demineralization. wound care consulted.       Interval History:  Patient feels fine today without complaints.  No fevers or chills.    Review of Systems  Objective:     Vital Signs (Most Recent):  Temp: 97.8 °F (36.6 °C) (05/23/24 0733)  Pulse: 65 (05/23/24 0817)  Resp: 18 (05/23/24 0733)  BP: 136/65 (05/23/24 0817)  SpO2: 97 % (05/23/24 0733) Vital Signs (24h Range):  Temp:  [97.8 °F (36.6 °C)-99 °F (37.2  "°C)] 97.8 °F (36.6 °C)  Pulse:  [64-82] 65  Resp:  [17-18] 18  SpO2:  [97 %-100 %] 97 %  BP: (128-169)/(59-78) 136/65     Weight: 79.8 kg (175 lb 14.8 oz)  Body mass index is 26.75 kg/m².    Intake/Output Summary (Last 24 hours) at 5/23/2024 0935  Last data filed at 5/23/2024 0917  Gross per 24 hour   Intake 600 ml   Output 3000 ml   Net -2400 ml         Physical Exam    Vitals reviewed.   Constitutional:       Appearance: He is ill-appearing (chronic).   HENT:      Mouth/Throat:      Mouth: Mucous membranes are moist.      Pharynx: Oropharynx is clear.   Cardiovascular:      Rate and Rhythm: Normal rate and regular rhythm.   Musculoskeletal:      Cervical back: Neck supple. No rigidity.   Skin:     General: Skin is warm and dry.   Neurological:      General: No focal deficit present.      Mental Status: He is alert and oriented to person, place, and time.    Significant Labs: All pertinent labs within the past 24 hours have been reviewed.    Significant Imaging: I have reviewed all pertinent imaging results/findings within the past 24 hours.    Assessment/Plan:      * Open wound of right foot  Per ID continue on vancomycin and cefazolin thru 5/20 (tomorrow)  S/p revascularization RLE as noted  BCX NGTD after initial BCX likely contaminant coag neg staph  Ortho following re: possible need for right BKA  multidisciplinary discussion of healing potential  Appears likely at this point that amputation will NOT be pursued  I note that recent xrays and CT right ankle March do NOT show any convincing evidence of osteomyelitis    -status post I and D.  Culture sent and we will follow.  Discussed with ID who will also follow up culture results      Atherosclerosis of native artery of right lower extremity with ulceration  As discussed under PAD    Subacute osteomyelitis of right foot  As per ID recs  Consider MRI of foot  Repeat ESR which was 88 May 10        Peripheral artery disease  Per Dr Lim 5/16  "successful right " "popliteal angioplasty with improved flow to RLE and foot. Will check post procedure flow with LOBO and toe pressures to evaluate healing potential. Continue wound care, offloading and optimization of comorbid conditions. Nutrition optimization. Will continue to follow"      Paroxysmal atrial fibrillation  CHADSVASC2 = 3  Eliquis has been on hold d/t peripheral intervention done 5/16, pt on heparin .  We will discuss with ortho transitioned back to Eliquis likely tomorrow        Anemia in chronic kidney disease  Stable  trend    Type 2 diabetes mellitus with diabetic neuropathy, with long-term current use of insulin  A1c 5.8%  SSI      Secondary hyperparathyroidism of renal origin  Continue sevelamer      ESRD (end stage renal disease)  HD MWF per renal      Hemiplegia and hemiparesis following cerebral infarction affecting right dominant side  No acute issues  Secondary preventive measures    BPH (benign prostatic hyperplasia) 2/18/21 prostate bx normal  Continue home regimen of tamsulosin and finasteride    Dyslipidemia  Statin was held d/t transaminitis  Resumed, trend LFT on atorva 80 mg    Benign essential HTN  Home regimen is unusual, labetalol 100 mg once daily  BP labile, HR in 60s  Added low dose amlodipine, BP improved a bit today  Pt has intolerance of ACEI (hyperkalemia)      VTE Risk Mitigation (From admission, onward)           Ordered     apixaban tablet 2.5 mg  2 times daily         05/23/24 0811     heparin 25,000 units in dextrose 5% 250 mL (100 units/mL) infusion HIGH INTENSITY nomogram - [facility]  Continuous        Question:  Begin at (units/kg/hr)  Answer:  18    05/13/24 1416     IP VTE HIGH RISK PATIENT  Once         05/10/24 1758     Place sequential compression device  Until discontinued         05/10/24 1758     Reason for No Pharmacological VTE Prophylaxis  Once        Question:  Reasons:  Answer:  Already adequately anticoagulated on oral Anticoagulants    05/10/24 1758              "       Discharge Planning   GARY: 5/24/2024     Code Status: Full Code   Is the patient medically ready for discharge?:     Reason for patient still in hospital (select all that apply): Laboratory test and Consult recommendations  Discharge Plan A: Home with family   Discharge Delays: None known at this time              Moises Coles III, MD  Department of Hospital Medicine   [facility]

## 2024-05-23 NOTE — PLAN OF CARE
I provided the patient a choice of post acute providers and offered a list of CMS rated Home Health.   Patient/family chose the following as their preferred providers:    [facility]        05/23/24 3445   Post-Acute Status   Post-Acute Authorization Home Health   Home Health Status Referrals Sent   Coverage [employer] Medicare   Discharge Delays (!) Post-Acute Set-up   Discharge Plan   Discharge Plan A Home Health   Discharge Plan B Home with family

## 2024-05-23 NOTE — PLAN OF CARE
Problem: Physical Therapy  Goal: Physical Therapy Goal  Description: Goals to be met by: 24     Patient will increase functional independence with mobility by performin. Supine to sit with Modified [place]  2. Sit to supine with Modified [place]  3. Rolling to Left and Right with Modified [place]  4. Bed to W/chair transfer with Moderate Assistance using Slideboard  5. Wheelchair propulsion x50 feet with Minimal Assistance using left upper extremity  6. Lower extremity exercise program 2 sets x15 reps per handout, with independence    Outcome: Progressing     Pt sat EOB with min A.

## 2024-05-23 NOTE — ASSESSMENT & PLAN NOTE
I independently reviewed patient's lab work and images as documented. 67 yo male with ESRD on HD, DM and CVA with R sided weakness with recent admission for R ankle OM (maintained on vanc/ancef x 6w, ruben 5/20) admitted for unhealing R wound. ID consulted for abx recs. Hospital course notable for blood cx with CONS, suspected contaminant, repeat blcx ngtd. Suspect poor wound healing due to decreased blood flow, s/p R angio on 5/16. Repeat R US with significant stenoses post-angio. Pt was evaluated by ortho, s/p washout on 5/21 - noted to have a deep abscess/purulent fluid, cx no growth to date.     Recommendations:  -given source control recommend empiric doxy PO for 14 d, ruben 6/3

## 2024-05-24 VITALS
HEIGHT: 68 IN | RESPIRATION RATE: 19 BRPM | TEMPERATURE: 98 F | WEIGHT: 175.94 LBS | HEART RATE: 66 BPM | OXYGEN SATURATION: 100 % | SYSTOLIC BLOOD PRESSURE: 123 MMHG | DIASTOLIC BLOOD PRESSURE: 60 MMHG | BODY MASS INDEX: 26.66 KG/M2

## 2024-05-24 LAB
ALBUMIN SERPL BCP-MCNC: 2 G/DL (ref 3.5–5.2)
ALP SERPL-CCNC: 63 U/L (ref 55–135)
ALT SERPL W/O P-5'-P-CCNC: <5 U/L (ref 10–44)
ANION GAP SERPL CALC-SCNC: 9 MMOL/L (ref 8–16)
APTT PPP: 35.7 SEC (ref 21–32)
AST SERPL-CCNC: 10 U/L (ref 10–40)
BASOPHILS # BLD AUTO: 0.04 K/UL (ref 0–0.2)
BASOPHILS NFR BLD: 1.1 % (ref 0–1.9)
BILIRUB SERPL-MCNC: 0.4 MG/DL (ref 0.1–1)
BUN SERPL-MCNC: 25 MG/DL (ref 8–23)
CALCIUM SERPL-MCNC: 8.1 MG/DL (ref 8.7–10.5)
CHLORIDE SERPL-SCNC: 103 MMOL/L (ref 95–110)
CO2 SERPL-SCNC: 23 MMOL/L (ref 23–29)
CREAT SERPL-MCNC: 6.5 MG/DL (ref 0.5–1.4)
DIFFERENTIAL METHOD BLD: ABNORMAL
EOSINOPHIL # BLD AUTO: 0.3 K/UL (ref 0–0.5)
EOSINOPHIL NFR BLD: 7.8 % (ref 0–8)
ERYTHROCYTE [DISTWIDTH] IN BLOOD BY AUTOMATED COUNT: 17.5 % (ref 11.5–14.5)
EST. GFR  (NO RACE VARIABLE): 9 ML/MIN/1.73 M^2
FINAL PATHOLOGIC DIAGNOSIS: NORMAL
GLUCOSE SERPL-MCNC: 101 MG/DL (ref 70–110)
GROSS: NORMAL
HCT VFR BLD AUTO: 29.2 % (ref 40–54)
HGB BLD-MCNC: 9.1 G/DL (ref 14–18)
IMM GRANULOCYTES # BLD AUTO: 0.01 K/UL (ref 0–0.04)
IMM GRANULOCYTES NFR BLD AUTO: 0.3 % (ref 0–0.5)
LYMPHOCYTES # BLD AUTO: 1 K/UL (ref 1–4.8)
LYMPHOCYTES NFR BLD: 25.7 % (ref 18–48)
Lab: NORMAL
MCH RBC QN AUTO: 25.9 PG (ref 27–31)
MCHC RBC AUTO-ENTMCNC: 31.2 G/DL (ref 32–36)
MCV RBC AUTO: 83 FL (ref 82–98)
MONOCYTES # BLD AUTO: 0.5 K/UL (ref 0.3–1)
MONOCYTES NFR BLD: 12.7 % (ref 4–15)
NEUTROPHILS # BLD AUTO: 1.9 K/UL (ref 1.8–7.7)
NEUTROPHILS NFR BLD: 52.4 % (ref 38–73)
NRBC BLD-RTO: 0 /100 WBC
PLATELET # BLD AUTO: 154 K/UL (ref 150–450)
PMV BLD AUTO: 9.4 FL (ref 9.2–12.9)
POCT GLUCOSE: 110 MG/DL (ref 70–110)
POCT GLUCOSE: 133 MG/DL (ref 70–110)
POTASSIUM SERPL-SCNC: 4.8 MMOL/L (ref 3.5–5.1)
PROT SERPL-MCNC: 5.2 G/DL (ref 6–8.4)
RBC # BLD AUTO: 3.52 M/UL (ref 4.6–6.2)
SODIUM SERPL-SCNC: 135 MMOL/L (ref 136–145)
WBC # BLD AUTO: 3.7 K/UL (ref 3.9–12.7)

## 2024-05-24 PROCEDURE — 94761 N-INVAS EAR/PLS OXIMETRY MLT: CPT | Mod: HCNC

## 2024-05-24 PROCEDURE — 25000003 PHARM REV CODE 250: Mod: HCNC | Performed by: STUDENT IN AN ORGANIZED HEALTH CARE EDUCATION/TRAINING PROGRAM

## 2024-05-24 PROCEDURE — 25000003 PHARM REV CODE 250: Mod: HCNC | Performed by: HOSPITALIST

## 2024-05-24 PROCEDURE — 80053 COMPREHEN METABOLIC PANEL: CPT | Mod: HCNC | Performed by: ORTHOPAEDIC SURGERY

## 2024-05-24 PROCEDURE — 90935 HEMODIALYSIS ONE EVALUATION: CPT | Mod: HCNC

## 2024-05-24 PROCEDURE — 63600175 PHARM REV CODE 636 W HCPCS: Mod: JZ,JG,HCNC | Performed by: ORTHOPAEDIC SURGERY

## 2024-05-24 PROCEDURE — 85025 COMPLETE CBC W/AUTO DIFF WBC: CPT | Mod: HCNC | Performed by: ORTHOPAEDIC SURGERY

## 2024-05-24 PROCEDURE — 85730 THROMBOPLASTIN TIME PARTIAL: CPT | Mod: HCNC | Performed by: ORTHOPAEDIC SURGERY

## 2024-05-24 PROCEDURE — 36415 COLL VENOUS BLD VENIPUNCTURE: CPT | Mod: HCNC | Performed by: ORTHOPAEDIC SURGERY

## 2024-05-24 RX ORDER — ATORVASTATIN CALCIUM 80 MG/1
80 TABLET, FILM COATED ORAL DAILY
Qty: 90 TABLET | Refills: 3 | Status: SHIPPED | OUTPATIENT
Start: 2024-05-24 | End: 2025-05-24

## 2024-05-24 RX ORDER — DOXYCYCLINE HYCLATE 100 MG
100 TABLET ORAL EVERY 12 HOURS
Qty: 26 TABLET | Refills: 0 | Status: SHIPPED | OUTPATIENT
Start: 2024-05-24

## 2024-05-24 RX ADMIN — ASPIRIN 81 MG: 81 TABLET, COATED ORAL at 08:05

## 2024-05-24 RX ADMIN — APIXABAN 2.5 MG: 2.5 TABLET, FILM COATED ORAL at 08:05

## 2024-05-24 RX ADMIN — FINASTERIDE 5 MG: 5 TABLET, FILM COATED ORAL at 08:05

## 2024-05-24 RX ADMIN — Medication 1 CAPSULE: at 08:05

## 2024-05-24 RX ADMIN — DOXYCYCLINE HYCLATE 100 MG: 100 TABLET, COATED ORAL at 08:05

## 2024-05-24 RX ADMIN — EPOETIN ALFA-EPBX 10000 UNITS: 10000 INJECTION, SOLUTION INTRAVENOUS; SUBCUTANEOUS at 08:05

## 2024-05-24 RX ADMIN — SEVELAMER CARBONATE 800 MG: 800 TABLET, FILM COATED ORAL at 02:05

## 2024-05-24 RX ADMIN — SEVELAMER CARBONATE 800 MG: 800 TABLET, FILM COATED ORAL at 04:05

## 2024-05-24 RX ADMIN — SEVELAMER CARBONATE 800 MG: 800 TABLET, FILM COATED ORAL at 08:05

## 2024-05-24 RX ADMIN — TAMSULOSIN HYDROCHLORIDE 0.8 MG: 0.4 CAPSULE ORAL at 08:05

## 2024-05-24 RX ADMIN — ATORVASTATIN CALCIUM 80 MG: 40 TABLET, FILM COATED ORAL at 08:05

## 2024-05-24 NOTE — PLAN OF CARE
Problem: Adult Inpatient Plan of Care  Goal: Plan of Care Review  Outcome: Met  Goal: Patient-Specific Goal (Individualized)  Outcome: Met  Goal: Absence of Hospital-Acquired Illness or Injury  Outcome: Met  Goal: Optimal Comfort and Wellbeing  Outcome: Met  Goal: Readiness for Transition of Care  Outcome: Met     Problem: Infection  Goal: Absence of Infection Signs and Symptoms  Outcome: Met     Problem: Diabetes Comorbidity  Goal: Blood Glucose Level Within Targeted Range  Outcome: Met     Problem: Wound  Goal: Optimal Coping  Outcome: Met  Goal: Optimal Functional Ability  Outcome: Met  Goal: Absence of Infection Signs and Symptoms  Outcome: Met  Goal: Improved Oral Intake  Outcome: Met  Goal: Skin Health and Integrity  Outcome: Met  Goal: Optimal Wound Healing  Outcome: Met     Problem: Skin Injury Risk Increased  Goal: Skin Health and Integrity  Outcome: Met     Problem: Hemodialysis  Goal: Safe, Effective Therapy Delivery  Outcome: Met  Goal: Effective Tissue Perfusion  Outcome: Met  Goal: Absence of Infection Signs and Symptoms  Outcome: Met     Problem: Fall Injury Risk  Goal: Absence of Fall and Fall-Related Injury  Outcome: Met

## 2024-05-24 NOTE — NURSING
Bedside Report given to night nurse DARRION Dillard. Walking rounds completed. Visualized and assessed patient NAD noted. Safety precautions maintained and call light within reach.     Chart check completed.

## 2024-05-24 NOTE — NURSING
[facility]  Nurses Note -- 4 Eyes      5/23/2024       Skin assessed on: Q Shift      [] No Pressure Injuries Present    []Prevention Measures Documented    [x] Yes LDA  for Pressure Injury Previously documented     [] Yes New Pressure Injury Discovered   [] LDA for New Pressure Injury Added      Attending RN:  Gilma Nicole, RN     Second RN:  Kaela Connors LPN

## 2024-05-24 NOTE — NURSING
In preparation for discharge, d/c'd pt's saline lock, applied pressure to site, secured gauze and coban, no redness or swelling noted. Instructions given. Reviewed all new meds, continued medications, follow up appointments and signs and symptoms to report to PCP or seek emergency medical care. Pt verbalized understanding to all instructions and education. Pt denies any complaints or concerns at this time. Pt was transported off the unit to ambulance for discharge.

## 2024-05-24 NOTE — PLAN OF CARE
ADT 30 order placed for Ambulance Transportation.  Requested  time: 4:00 pm    If transportation does not arrive at ETA time nurse will be instructed to follow protocol for transportation below:    How can I get in touch directly with dispatch, if needed?                 Non-emergent dispatch: [phone]

## 2024-05-24 NOTE — PLAN OF CARE
Pt's daughter expressed understanding. Pt has copy of IMM     05/24/24 1019   Medicare Message   Important Message from Medicare regarding Discharge Appeal Rights Given to patient/caregiver;Explained to patient/caregiver;Signed/date by patient/caregiver   Date IMM was signed 05/24/24   Time IMM was signed [id number]

## 2024-05-24 NOTE — PLAN OF CARE
Problem: Adult Inpatient Plan of Care  Goal: Optimal Comfort and Wellbeing  Outcome: Progressing     Problem: Diabetes Comorbidity  Goal: Blood Glucose Level Within Targeted Range  Outcome: Progressing     Problem: Wound  Goal: Skin Health and Integrity  Outcome: Progressing

## 2024-05-24 NOTE — NURSING
[facility] [place]  Nurses Note -- 4 Eyes      5/24/2024       Skin assessed on: Q Shift      [] No Pressure Injuries Present    []Prevention Measures Documented    [x] Yes LDA  for Pressure Injury Previously documented     [] Yes New Pressure Injury Discovered   [] LDA for New Pressure Injury Added      Attending RN:  Kaela Connors LPN     Second RN:  Gilma NicoleRN

## 2024-05-24 NOTE — PROGRESS NOTES
Date of Admission:5/10/2024    SUBJECTIVE: notes feeling well   Denies fever    Current Facility-Administered Medications   Medication    acetaminophen tablet 650 mg    aluminum-magnesium hydroxide-simethicone 200-200-20 mg/5 mL suspension 30 mL    amLODIPine tablet 2.5 mg    apixaban tablet 2.5 mg    aspirin EC tablet 81 mg    atorvastatin tablet 80 mg    bisacodyL suppository 10 mg    dextrose 10% bolus 125 mL 125 mL    dextrose 10% bolus 250 mL 250 mL    doxycycline tablet 100 mg    epoetin marisol-epbx injection 10,000 Units    finasteride tablet 5 mg    glucagon (human recombinant) injection 1 mg    glucose chewable tablet 16 g    glucose chewable tablet 24 g    insulin aspart U-100 pen 0-10 Units    labetaloL tablet 100 mg    melatonin tablet 6 mg    naloxone 0.4 mg/mL injection 0.02 mg    ondansetron injection 4 mg    oxyCODONE-acetaminophen 5-325 mg per tablet 1 tablet    polyethylene glycol packet 17 g    prochlorperazine injection Soln 5 mg    sevelamer carbonate tablet 800 mg    simethicone chewable tablet 80 mg    sodium chloride 0.9% bolus 250 mL 250 mL    sodium chloride 0.9% flush 10 mL    tamsulosin 24 hr capsule 0.8 mg    vitamin renal formula (B-complex-vitamin c-folic acid) 1 mg per capsule 1 capsule       Wt Readings from Last 3 Encounters:   05/20/24 79.8 kg (175 lb 14.8 oz)   04/02/24 90.4 kg (199 lb 4.7 oz)   03/07/24 79.8 kg (175 lb 14.8 oz)     Temp Readings from Last 3 Encounters:   05/24/24 97.7 °F (36.5 °C)   04/19/24 98.3 °F (36.8 °C) (Oral)   02/06/24 98 °F (36.7 °C) (Oral)     BP Readings from Last 3 Encounters:   05/24/24 (!) 169/79   04/19/24 (!) 138/53   03/07/24 129/65     Pulse Readings from Last 3 Encounters:   05/24/24 69   04/19/24 79   02/06/24 80       Intake/Output Summary (Last 24 hours) at 5/24/2024 1030  Last data filed at 5/23/2024 2130  Gross per 24 hour   Intake 680 ml   Output --   Net 680 ml       PE:  GEN:wd male in nad  HEENT:ncat,eomi,mm  CVS:s1s2  regular  PULM:ctab  ABD:bs,soft  EXT:no leg edema, avf of arm  NEURO:awake,alert    Recent Labs   Lab 05/24/24  0545      *   K 4.8      CO2 23   BUN 25*   CREATININE 6.5*   CALCIUM 8.1*       Lab Results   Component Value Date     (H) 04/22/2024    CALCIUM 8.1 (L) 05/24/2024    PHOS 2.9 05/19/2024       Recent Labs   Lab 05/24/24  0545   WBC 3.70*   RBC 3.52*   HGB 9.1*   HCT 29.2*      MCV 83   MCH 25.9*   MCHC 31.2*           A/P:  1.esrd. cont hd MWF. Seen on hd.  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 Epo cont.   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 Cont binders. Phos ok.  4.dm2. following sugars.  5.htn. cont bp meds.  6.foot infection. Cont care. Now doxy. Off iv antibiotics.

## 2024-05-24 NOTE — PLAN OF CARE
Case Management Final Discharge Note    Discharge Disposition: [facility] / Home     New DME ordered / company name: None     Relevant SDOH / Transition of Care Barriers:  None     Primary Caretaker and contact information: Daughter Peg [phone]    Scheduled followup appointment: PCP scheduled    Referrals placed: Infectious Disease, Orthopedic, Nephrology and Vascular Surgery scheduled. [facility].     CM faxed over a referral for additional at home wound care to [facility].     Transportation: Ambulance transportation home    Patient and family educated on discharge services and updated on DC plan. Bedside RN notified, patient clear to discharge from Case Management Perspective.       05/24/24 1139   Final Note   Assessment Type Final Discharge Note   Anticipated Discharge Disposition Home-Health   What phone number can be called within the next 1-3 days to see how you are doing after discharge? [phone]   Hospital Resources/Appts/Education Provided Appointments scheduled and added to AVS   Post-Acute Status   Post-Acute Authorization Home Health   Home Health Status Set-up Complete/Auth obtained   [facility] [employer] [facility]   Discharge Delays None known at this time

## 2024-05-24 NOTE — PT/OT/SLP PROGRESS
Physical Therapy      Patient Name:  Miguel Moore   MRN:  [id number]    Patient not seen at this time for PT tx secondary to Dialysis. Will follow-up.

## 2024-05-24 NOTE — DISCHARGE SUMMARY
[facility]  Discharge Summary      Patient Name: Miguel Moore  MRN: [id number]  [facility]: [id number]  Patient Class: IP- Inpatient  Admission Date: 5/10/2024  Hospital Length of Stay: 14 days  Discharge Date and Time: No discharge date for patient encounter.  Attending Physician: Moises Coles III, MD   Discharging Provider: Moises Coles III, MD  Primary Care Provider: Raciel Raymond MD    Primary Care Team: Networked reference to record PCT     HPI:   68 y.o. male with hypertension, BPH, chronic right lower extremity DVT, DM2, dyslipidemia, ESRD on HD, hemiplegia and hemiparesis following cerebral infarction affecting the right dominant side, paroxysmal AFib, seizure disorder as sequela of CVA sent to the ED from wound care for new wound infection.  Denies fever, chills, cough, shortness breath, chest pain, dizziness, syncope.  Was hospitalized in March for cellulitis and abscess to the right foot with surgical intervention at that time.  In the ED today, CRP and procalcitonin elevated.  Blood and wound cultures were obtained.  IV antibiotics initiated.    Procedure(s) (LRB):  INCISION AND DRAINAGE, LOWER EXTREMITY (Right)      Hospital Course:   67yo M ESRD, PAD, R foot wounds s/p multiple surgeries admitted w worsening wound. Did have Staph epi in blood culture, likely contaminant. He does not want further debridement, wants BKA if needed. Started vancomycin and meropenem given his history of ESBL organisms. Ortho Bone and Joint consulted. Vascular consulted- plan for RLE angiogram on 05/16 . Nephro following for HD. He also has L heel dark area, XR left heel with Osseous structures demonstrate no evidence for acute fracture or osseous destructive lesion.  Mild diffuse demineralization. wound care consulted.  Patient went for I and D by ortho with cultures taken.  Id continued to follow and cultures negative.  Id recommending discharge with doxycycline for 14 days to complete  a course.  Patient had dialysis prior to discharge home with home health to continue wound care with vascular, ID, and orthopedic follow up.  Patient endorsed understanding of plan and all questions answered prior to discharge home     Goals of Care Treatment Preferences:  Code Status: Full Code       La[place]: Yes           Consults:   Consults (From admission, onward)          Status Ordering Provider     Inpatient consult to Infectious Diseases  Once        Provider:  Laney Underwood MD    Completed TORSTEN CLARK     Inpatient consult to Hematology  Once        Provider:  (Not yet assigned)    Completed KACEY BUSH     Inpatient consult to Hematology  Once        Provider:  (Not yet assigned)    Completed BLU GUZMAN     Inpatient consult to Vascular Surgery  Once        Provider:  Blu Guzman MD    Acknowledged JIMBO DOWNEY III     Inpatient consult to Nephrology  Once        Provider:  Karen Tamez MD    Acknowledged CARL SPANN     Inpatient consult to Orthopedic Surgery  Once        Provider:  Jimbo Downey III, MD    Completed CARL SPANN     Inpatient consult to Podiatry  Once        Provider:  Sabi Douglas DPM    Completed SARA DODD            No new Assessment & Plan notes have been filed under this hospital service since the last note was generated.  Service: Hospital Medicine    Final Active Diagnoses:    Diagnosis Date Noted POA    PRINCIPAL PROBLEM:  Open wound of right foot [S91.301A] 05/10/2024 Yes    Subacute osteomyelitis of right foot [M86.271] 05/13/2024 Yes    Atherosclerosis of native artery of right lower extremity with ulceration [I70.239] 05/13/2024 Yes    Peripheral artery disease [I73.9] 04/25/2024 Yes    Paroxysmal atrial fibrillation [I48.0] 03/20/2024 Yes     Chronic    Anemia in chronic kidney disease [N18.9, D63.1] 08/26/2020 Yes     Chronic    Type 2 diabetes mellitus with diabetic neuropathy, with long-term current use  of insulin [E11.40, Z79.4] 05/10/2018 Not Applicable     Chronic    Secondary hyperparathyroidism of renal origin [N25.81] 08/03/2017 Yes    ESRD (end stage renal disease) [N18.6] 03/22/2017 Yes     Chronic    Benign essential HTN [I10] 03/21/2017 Yes     Chronic    BPH (benign prostatic hyperplasia) 2/18/21 prostate bx normal [N40.0] 03/21/2017 Yes     Chronic    Dyslipidemia [E78.5] 03/21/2017 Yes     Chronic    Hemiplegia and hemiparesis following cerebral infarction affecting right dominant side [I69.351] 03/21/2017 Not Applicable     Chronic      Problems Resolved During this Admission:    Diagnosis Date Noted Date Resolved POA    Controlled type 2 diabetes mellitus with chronic kidney disease on chronic dialysis, with long-term current use of insulin [E11.22, N18.6, Z79.4, Z99.2] 03/21/2017 05/10/2024 Not Applicable     Chronic       Discharged Condition: fair    Disposition: Home or Self Care    Follow Up:   Follow-up Information       Jimbo Montilla III, MD. Go on 6/6/2024.    Specialty: Orthopedic Surgery  Why: Appointment scheduled for 1:45pm  Contact information:  [address] [address]  [phone]               [facility] [place]. Go on 5/27/2024.    Why: To follow-up for Nephrology and dialysis treatment.  Contact information:  [address] [id number] [phone]                         Patient Instructions:      Ambulatory referral/consult to Nephrology   Standing Status: Future   Referral Priority: Routine Referral Type: Consultation   Referral Reason: Specialty Services Required   Requested Specialty: Nephrology   Number of Visits Requested: 1     Ambulatory referral/consult to [facility]   Standing Status: Future   Referral Priority: Urgent Referral Type: Consultation   Referral Reason: Specialty Services Required   Number of Visits Requested: 1     Ambulatory referral/consult to Vascular Surgery   Standing  Status: Future   Referral Priority: Routine Referral Type: Consultation   Referral Reason: Specialty Services Required   Requested Specialty: Vascular Surgery   Number of Visits Requested: 1     Ambulatory referral/consult to Orthopedics   Standing Status: Future   Referral Priority: Routine Referral Type: Consultation   Requested Specialty: Orthopedic Surgery   Number of Visits Requested: 1     Ambulatory referral/consult to Infectious Disease   Standing Status: Future   Referral Priority: Routine Referral Type: Consultation   Referral Reason: Specialty Services Required   Requested Specialty: Infectious Diseases   Number of Visits Requested: 1     Notify your health care provider if you experience any of the following:  increased confusion or weakness     Notify your health care provider if you experience any of the following:  persistent dizziness, light-headedness, or visual disturbances     Notify your health care provider if you experience any of the following:  worsening rash     Notify your health care provider if you experience any of the following:  severe persistent headache     Notify your health care provider if you experience any of the following:  difficulty breathing or increased cough     Notify your health care provider if you experience any of the following:  redness, tenderness, or signs of infection (pain, swelling, redness, odor or green/yellow discharge around incision site)     Notify your health care provider if you experience any of the following:  severe uncontrolled pain     Notify your health care provider if you experience any of the following:  persistent nausea and vomiting or diarrhea     Notify your health care provider if you experience any of the following:  temperature >100.4     Activity as tolerated       Significant Diagnostic Studies: Labs: All labs within the past 24 hours have been reviewed    Pending Diagnostic Studies:       Procedure Component Value Units Date/Time     Specimen to Pathology, Surgery Orthopedics [[id number]] Collected: 05/21/24 1731    Order Status: Sent Lab Status: In process Updated: 05/21/24 1732    Specimen: Tissue            Medications:  Reconciled Home Medications:      Medication List        START taking these medications      atorvastatin 80 MG tablet  Commonly known as: LIPITOR  Take 1 tablet (80 mg total) by mouth once daily.     doxycycline 100 MG tablet  Commonly known as: VIBRA-TABS  Take 1 tablet (100 mg total) by mouth every 12 (twelve) hours.            CONTINUE taking these medications      apixaban 5 mg Tab  Commonly known as: ELIQUIS  Take 1 tablet (5 mg total) by mouth 2 (two) times daily.     aspirin 81 MG EC tablet  Commonly known as: ECOTRIN  Take 1 tablet (81 mg total) by mouth once daily.     ferrous gluconate 324 MG tablet  Commonly known as: FERGON  Take 1 tablet (324 mg total) by mouth 2 (two) times daily with meals.     finasteride 5 mg tablet  Commonly known as: PROSCAR  Take 1 tablet (5 mg total) by mouth once daily.     iron sucrose in  mg/100 mL Pgbk  Commonly known as: VENOFER  50 mg.     labetaloL 100 MG tablet  Commonly known as: NORMODYNE  Take 1 tablet (100 mg total) by mouth once daily.     MIRCERA INJ  75 mcg.     mupirocin 2 % ointment  Commonly known as: BACTROBAN  Apply to affected area 3 times daily     oxyCODONE-acetaminophen  mg per tablet  Commonly known as: PERCOCET  Take 1 tablet by mouth every 6 (six) hours as needed for Pain.     RENVELA 800 mg Tab  Generic drug: sevelamer carbonate  Take 1 tablet (800 mg total) by mouth 3 (three) times daily with meals.     tamsulosin 0.4 mg Cap  Commonly known as: FLOMAX  Take 2 capsules (0.8 mg total) by mouth once daily.     TRIPHROCAPS 1 mg Cap  Generic drug: vitamin renal formula (B-complex-vitamin c-folic acid)  Take 1 capsule by mouth once daily.            STOP taking these medications      dextrose 5 % in water (D5W) PgBk 50 mL with ceFAZolin 1 gram SolR      VANCOMYCIN 500 MG/100 ML D5W (READY TO MIX)              Indwelling Lines/Drains at time of discharge:   Lines/Drains/Airways       Drain  Duration                  Hemodialysis AV Fistula Left upper arm -- days                    Time spent on the discharge of patient: 40 minutes         Moises Coles III, MD  Department of Hospital Medicine  [facility] Observation

## 2024-05-24 NOTE — PLAN OF CARE
[facility]        HOME HEALTH ORDERS  FACE TO FACE ENCOUNTER     Patient Name: Miguel Moore  YOB: 1955     PCP: Raciel Raymond MD   PCP Address: [address] / [place]  PCP Phone Number: [phone]  PCP Fax: [phone]     Encounter Date: 5/10/24     Admit to Home Health     Diagnoses:         Active Hospital Problems     Diagnosis   POA    *Open wound of right foot [S91.301A]   Yes    Subacute osteomyelitis of right foot [M86.271]   Yes    Atherosclerosis of native artery of right lower extremity with ulceration [I70.239]   Yes    Peripheral artery disease [I73.9]   Yes       3/20/24 RLE arterial US Arterial vasculature of the right lower extremity is patent.  However, there is evidence of atherosclerotic change with stenosis at the level of popliteal artery.        Paroxysmal atrial fibrillation [I48.0]   Yes       Chronic       3/20/24 TTE LV normal systolic function LVEF 55-60%.  Converted to sinus rhythm on amiodarone   Amiodarone stopped on hospital discharge due to possible contributor to transaminitis       Anemia in chronic kidney disease [N18.9, D63.1]   Yes       Chronic    Type 2 diabetes mellitus with diabetic neuropathy, with long-term current use of insulin [E11.40, Z79.4]   Not Applicable       Chronic    Secondary hyperparathyroidism of renal origin [N25.81]   Yes    ESRD (end stage renal disease) [N18.6]   Yes       Chronic       2/27/20 renal US with dopplers no ALF.  Medical renal disease  8/2020 renal US: 1. Symmetric diffusely increased cortical echogenicity and elevated resistive indices, nonspecific indicators of chronic medical renal disease.  2. Prostatomegaly.  Some of the provided images are suggestive of a distinct hyperechoic component of the prostate gland, of uncertain etiology or significance, and potentially artifactual.  Dedicated prostate ultrasound or MRI may be of benefit for further characterization.     Started on HD while  hospitalized for progression to ESRD 8/2020  -Continue dialysis per nephrology  -Outpatient HD has been arranged  -Had planned discharge 8/27 if remained afebrile and cultures negative.  Unfortunately his blood culture grew Candida parapsilosis  -Dr. Gomez with ID consulted and case discussed with him.  Started micafungin 8/27 and now on fluconazole.  -Dialysis line removed after HD 8/28 and plan line holiday over weekend.  -new line by IR on 8/31, tolerated dialysis fluid.  -continue outpatient dialysis.       Benign essential HTN [I10]   Yes       Chronic       01/06/2021 TTE mild left atrial enlargement.  LV normal size and systolic function LVEF 55%.  Indeterminate diastolic function.  Mild right atrial enlargement.  Normal RV systolic function.  Normal RV size.  PA pressure 20.  Normal CVP.  Three.       BPH (benign prostatic hyperplasia) 2/18/21 prostate bx normal [N40.0]   Yes       Chronic       6/26/2017 renal US mod prostatomegaly.       Dyslipidemia [E78.5]   Yes       Chronic    Hemiplegia and hemiparesis following cerebral infarction affecting right dominant side [I69.351]   Not Applicable       Chronic       Resolved Hospital Problems     Diagnosis Date Resolved POA    Controlled type 2 diabetes mellitus with chronic kidney disease on chronic dialysis, with long-term current use of insulin [E11.22, N18.6, Z79.4, Z99.2] 05/10/2024 Not Applicable       Chronic         Follow Up Appointments:         Future Appointments   Date Time Provider Department Center   6/4/2024  1:00 PM Amelia Sofia, NP [facility]   6/6/2024  8:45 AM Sabi Douglas, DPM [facility]         Allergies:        Review of patient's allergies indicates:   Allergen Reactions    Ace inhibitors         Hyperkalemia 8/2018  Other reaction(s): Unknown    Penicillins Hives       Tolerated cefepime and cefdinir previously    Tizanidine         Felt hot         Medications: Review discharge medications with patient  and family and provide education.     Current Medications             Current Facility-Administered Medications   Medication Dose Route Frequency Provider Last Rate Last Admin    acetaminophen tablet 650 mg  650 mg Oral Q6H PRN Ajay Baxter Jr., NP        aluminum-magnesium hydroxide-simethicone 200-200-20 mg/5 mL suspension 30 mL  30 mL Oral QID PRN Ajay Baxter Jr., SHAYLA        amLODIPine tablet 2.5 mg  2.5 mg Oral Daily Jimbo Montilla III, MD   2.5 mg at 05/23/24 0817    apixaban tablet 2.5 mg  2.5 mg Oral BID Moises Coles III, MD   2.5 mg at 05/23/24 0849    aspirin EC tablet 81 mg  81 mg Oral Daily Ajay Baxter Jr., NP   81 mg at 05/23/24 0817    atorvastatin tablet 80 mg  80 mg Oral Daily Patricia Reddy MD   80 mg at 05/23/24 0817    bisacodyL suppository 10 mg  10 mg Rectal Daily PRN Jimbo Montilla III, MD   10 mg at 05/12/24 0634    dextrose 10% bolus 125 mL 125 mL  12.5 g Intravenous PRN Ajay Baxter Jr., NP        dextrose 10% bolus 250 mL 250 mL  25 g Intravenous PRN Ajay Baxter Jr., NP        doxycycline tablet 100 mg  100 mg Oral Q12H Laney Underwood MD        epoetin marisol-epbx injection 10,000 Units  10,000 Units Subcutaneous Every Mon, Wed, Fri Jimbo Montilla III, MD   10,000 Units at 05/22/24 0851    finasteride tablet 5 mg  5 mg Oral Daily Ajay Baxter Jr., NP   5 mg at 05/23/24 0817    glucagon (human recombinant) injection 1 mg  1 mg Intramuscular PRN Ajay Baxter Jr., NP        glucose chewable tablet 16 g  16 g Oral PRN Ajay Baxter Jr., NP        glucose chewable tablet 24 g  24 g Oral PRN Ajay Baxter Jr., NP        insulin aspart U-100 pen 0-10 Units  0-10 Units Subcutaneous QID (AC + HS) PRN Ajay Baxter Jr., NP   2 Units at 05/19/24 1650    labetaloL tablet 100 mg  100 mg Oral Daily Ajay Baxter Jr., NP   100 mg at 05/23/24 0817    melatonin tablet 6 mg  6 mg Oral Nightly PRN Ajay Baxter Jr., NP   6 mg at 05/19/24 3467    naloxone  0.4 mg/mL injection 0.02 mg  0.02 mg Intravenous PRN Ajay Baxter Jr., SHAYLA        ondansetron injection 4 mg  4 mg Intravenous Q8H PRN Ajay Baxter Jr., SHAYLA        oxyCODONE-acetaminophen 5-325 mg per tablet 1 tablet  1 tablet Oral Q8H PRN Jimbo Montilla III, MD   1 tablet at 05/23/24 0610    polyethylene glycol packet 17 g  17 g Oral BID Jimbo Montilla III, MD   17 g at 05/22/24 2033    prochlorperazine injection Soln 5 mg  5 mg Intravenous Q6H PRN Ajay Baxter Jr., NP        sevelamer carbonate tablet 800 mg  800 mg Oral TID WM Ajay Baxter Jr., NP   800 mg at 05/23/24 1147    simethicone chewable tablet 80 mg  1 tablet Oral QID PRN Ajay Baxter Jr., NP        sodium chloride 0.9% bolus 250 mL 250 mL  250 mL Intravenous PRN Jimbo Montilla III, MD        sodium chloride 0.9% flush 10 mL  10 mL Intravenous Q8H PRN Ajay Baxter Jr., NP        tamsulosin 24 hr capsule 0.8 mg  2 capsule Oral Daily Ajay Baxter Jr., NP   0.8 mg at 05/23/24 0817    vitamin renal formula (B-complex-vitamin c-folic acid) 1 mg per capsule 1 capsule  1 capsule Oral Daily Ajay Baxter Jr., NP   1 capsule at 05/23/24 0817         Current Discharge Medication List               CONTINUE these medications which have NOT CHANGED     Details   apixaban (ELIQUIS) 5 mg Tab Take 1 tablet (5 mg total) by mouth 2 (two) times daily.  Qty: 180 tablet, Refills: 3     Associated Diagnoses: Acute deep vein thrombosis (DVT) of popliteal vein of right lower extremity       aspirin (ECOTRIN) 81 MG EC tablet Take 1 tablet (81 mg total) by mouth once daily.  Qty: 90 tablet, Refills: 3     Comments: Stop plavix  Associated Diagnoses: History of stroke       ferrous gluconate (FERGON) 324 MG tablet Take 1 tablet (324 mg total) by mouth 2 (two) times daily with meals.  Qty: 60 tablet, Refills: 11       finasteride (PROSCAR) 5 mg tablet Take 1 tablet (5 mg total) by mouth once daily.  Qty: 90 tablet, Refills: 3     Associated  Diagnoses: Benign non-nodular prostatic hyperplasia without lower urinary tract symptoms       iron sucrose in NS (VENOFER) 100 mg/100 mL PgBk 50 mg.       labetaloL (NORMODYNE) 100 MG tablet Take 1 tablet (100 mg total) by mouth once daily.  Qty: 90 tablet, Refills: 3     Comments: .  Associated Diagnoses: Benign essential HTN       methoxy peg-epoetin beta (MIRCERA INJ) 75 mcg.       mupirocin (BACTROBAN) 2 % ointment Apply to affected area 3 times daily  Qty: 22 g, Refills: 1     Associated Diagnoses: Nail abnormality       oxyCODONE-acetaminophen (PERCOCET)  mg per tablet Take 1 tablet by mouth every 6 (six) hours as needed for Pain.  Qty: 10 tablet, Refills: 0     Comments: Quantity prescribed more than 7 day supply? No  Associated Diagnoses: Leg abscess       RENVELA 800 mg Tab Take 1 tablet (800 mg total) by mouth 3 (three) times daily with meals.  Qty: 90 tablet, Refills: 0     Associated Diagnoses: ESRD (end stage renal disease)       tamsulosin (FLOMAX) 0.4 mg Cap Take 2 capsules (0.8 mg total) by mouth once daily.  Qty: 180 capsule, Refills: 3     Associated Diagnoses: Benign non-nodular prostatic hyperplasia without lower urinary tract symptoms       vitamin renal formula, B-complex-vitamin c-folic acid, (NEPHROCAP) 1 mg Cap Take 1 capsule by mouth once daily.  Qty: 30 capsule, Refills: 11                   STOP taking these medications         dextrose 5 % in water (D5W) PgBk 50 mL with ceFAZolin 1 gram SolR Comments:   Reason for Stopping:            vancomycin HCl (VANCOMYCIN 500 MG/100 ML D5W, READY TO MIX,) Comments:   Reason for Stopping:                       I have seen and examined this patient within the last 30 days. My clinical findings that support the need for the home health skilled services and home bound status are the following:no              Weakness/numbness causing balance and gait disturbance due to Infection making it taxing to leave home.      Diet:   renal diet         Referrals/ Consults  Physical Therapy to evaluate and treat. Evaluate for home safety and equipment needs; Establish/upgrade home exercise program. Perform / instruct on therapeutic exercises, gait training, transfer training, and Range of Motion.  Occupational Therapy to evaluate and treat. Evaluate home environment for safety and equipment needs. Perform/Instruct on transfers, ADL training, ROM, and therapeutic exercises.   to evaluate for community resources/long-range planning.  Aide to provide assistance with personal care, ADLs, and vital signs.     Activities:   activity as tolerated     Nursing:   Agency to admit patient within 24 hours of hospital discharge unless specified on physician order or at patient request     SN to complete comprehensive assessment including routine vital signs. Instruct on disease process and s/s of complications to report to MD. Review/verify medication list sent home with the patient at time of discharge  and instruct patient/caregiver as needed. Frequency may be adjusted depending on start of care date.      Skilled nurse to perform up to 3 visits PRN for symptoms related to diagnosis     Ok to schedule additional visits based on staff availability and patient request on consecutive days within the home health episode.     When multiple disciplines ordered:     Start of Care occurs on Sunday - Wednesday schedule remaining discipline evaluations as ordered on separate consecutive days following the start of care.     Thursday SOC -schedule subsequent evaluations Friday and Monday the following week.      Friday - Saturday SOC - schedule subsequent discipline evaluations on consecutive days starting Monday of the following week.       Miscellaneous   Routine Skin for Bedridden Patients: Instruct patient/caregiver to apply moisture barrier cream to all skin folds and wet areas in perineal area daily and after baths and all bowel movements.     Home Health  Aide:  Nursing Three times weekly, Physical Therapy Three times weekly, Occupational Therapy Three times weekly, Medical Social Work Three times weekly, and Home Health Aide Three times weekly    Wound Care Orders  yes:     Cleanse wounds with Vashe.   Fill plantar foot wound with Aquacel Ag hydrofiber cut into ribbon- wick of dressing exiting wound for removal. Fill center of wound with Aquacel Ag hydrofiber. Cover lateral foot/ankle with hydrofiber.  Apply dry gauze over eschar on heel and plantar foot. Wrapped with 2 rolls of Kerlix. Replace ELIUDMORELIA boot.  I certify that this patient is confined to his home and needs intermittent skilled nursing care, physical therapy, and occupational therapy.

## 2024-05-24 NOTE — PROGRESS NOTES
The patient's bed.  He reports he did okay with dressing changes.  He is ready for discharge tomorrow.    Temp:  [97.6 °F (36.4 °C)-98.4 °F (36.9 °C)] 97.6 °F (36.4 °C)  Pulse:  [64-71] 64  Resp:  [16-19] 16  SpO2:  [93 %-100 %] 98 %  BP: (128-172)/(59-76) 130/59    Physical examination:    Right foot is dressed.  Photos reviewed.      Recent Labs   Lab 05/23/24  0443   WBC 3.80*   RBC 3.37*   HGB 8.7*   HCT 27.7*   *   MCV 82   MCH 25.8*   MCHC 31.4*         Current Facility-Administered Medications:     acetaminophen tablet 650 mg, 650 mg, Oral, Q6H PRN, Ajay Baxter Jr., NP    aluminum-magnesium hydroxide-simethicone 200-200-20 mg/5 mL suspension 30 mL, 30 mL, Oral, QID PRN, Ajay Baxter Jr., NP    amLODIPine tablet 2.5 mg, 2.5 mg, Oral, Daily, Jimbo Montilla III, MD, 2.5 mg at 05/23/24 0817    apixaban tablet 2.5 mg, 2.5 mg, Oral, BID, Moisse Coles III, MD, 2.5 mg at 05/23/24 0849    aspirin EC tablet 81 mg, 81 mg, Oral, Daily, Ajay Baxter Jr., NP, 81 mg at 05/23/24 0817    atorvastatin tablet 80 mg, 80 mg, Oral, Daily, Patricia Reddy MD, 80 mg at 05/23/24 0817    bisacodyL suppository 10 mg, 10 mg, Rectal, Daily PRN, Jimbo Montilla III, MD, 10 mg at 05/12/24 0634    dextrose 10% bolus 125 mL 125 mL, 12.5 g, Intravenous, PRN, Ajay Baxter Jr., NP    dextrose 10% bolus 250 mL 250 mL, 25 g, Intravenous, PRN, Ajay Baxter Jr., NP    doxycycline tablet 100 mg, 100 mg, Oral, Q12H, Laney Underwood MD    epoetin marisol-epbx injection 10,000 Units, 10,000 Units, Subcutaneous, Every Mon, Wed, Fri, Jimbo Montilla III, MD, 10,000 Units at 05/22/24 0851    finasteride tablet 5 mg, 5 mg, Oral, Daily, Ajay Baxter Jr., NP, 5 mg at 05/23/24 0817    glucagon (human recombinant) injection 1 mg, 1 mg, Intramuscular, PRN, Ajay Baxter Jr., NP    glucose chewable tablet 16 g, 16 g, Oral, PRN, Ajay Baxter Jr., NP    glucose chewable tablet 24 g, 24 g, Oral, PRN, Ajay Baxter  , NP    insulin aspart U-100 pen 0-10 Units, 0-10 Units, Subcutaneous, QID (AC + HS) PRN, Ajay Baxter Jr., NP, 2 Units at 05/19/24 1650    labetaloL tablet 100 mg, 100 mg, Oral, Daily, Ajay Baxter Jr., NP, 100 mg at 05/23/24 0817    melatonin tablet 6 mg, 6 mg, Oral, Nightly PRN, Ajay Baxter Jr., NP, 6 mg at 05/19/24 2158    naloxone 0.4 mg/mL injection 0.02 mg, 0.02 mg, Intravenous, PRN, Ajay aBxter Jr., NP    ondansetron injection 4 mg, 4 mg, Intravenous, Q8H PRN, Ajay Baxter Jr., SHAYLA    oxyCODONE-acetaminophen 5-325 mg per tablet 1 tablet, 1 tablet, Oral, Q8H PRN, Jimbo Montilla III, MD, 1 tablet at 05/23/24 1653    polyethylene glycol packet 17 g, 17 g, Oral, BID, Jimbo Montilla III, MD, 17 g at 05/22/24 2033    prochlorperazine injection Soln 5 mg, 5 mg, Intravenous, Q6H PRN, Ajay Baxter Jr., NP    sevelamer carbonate tablet 800 mg, 800 mg, Oral, TID WM, Ajay Baxter Jr., NP, 800 mg at 05/23/24 1656    simethicone chewable tablet 80 mg, 1 tablet, Oral, QID PRN, Ajay Baxter Jr., NP    sodium chloride 0.9% bolus 250 mL 250 mL, 250 mL, Intravenous, PRN, Jimbo Montilla III, MD    sodium chloride 0.9% flush 10 mL, 10 mL, Intravenous, Q8H PRN, Ajay Baxter Jr., NP    tamsulosin 24 hr capsule 0.8 mg, 2 capsule, Oral, Daily, Ajay Baxter Jr., NP, 0.8 mg at 05/23/24 0817    vitamin renal formula (B-complex-vitamin c-folic acid) 1 mg per capsule 1 capsule, 1 capsule, Oral, Daily, Ajay Baxter Jr., NP, 1 capsule at 05/23/24 0817    Assessment and Plan:    68-year-old male with end-stage renal disease and right foot abscess.  He previously been treated for right ankle abscess.  He has evidence of necrosis of the skin on his foot.  He presented with a right foot plantar abscess draining purulent material.  After vascular surgery intervention for angioplasty he was taken to surgery on 5/21 for I&D of his right foot plantar wound.       Right foot wound infection.  Cultures  no growth.      Continue dressing changes with wound care.   Hopefully he has enough improvement in blood flow to heal this infection and wound without amputation.     Appreciate vascular surgery assistance     End-stage renal disease-on dialysis     Discharge planning in progress    Follow up Dr. Montilla 2 weeks.    Jimbo Montilla MD  [facility]  [phone]  This note has been transcribed with voice recognition software, but not reviewed and may contain unrecognized errors.

## 2024-05-25 LAB
BACTERIA SPEC AEROBE CULT: NO GROWTH
BACTERIA SPEC ANAEROBE CULT: NORMAL

## 2024-05-30 ENCOUNTER — PATIENT OUTREACH (OUTPATIENT)
Dept: ADMINISTRATIVE | Facility: CLINIC | Age: 69
End: 2024-05-30
Payer: MEDICARE

## 2024-05-30 NOTE — PROGRESS NOTES
C3 nurse spoke with Miguel Moore  for a TCC post hospital discharge follow up call. The patient has a scheduled HOSFU appointment with Raciel Raymond MD on 5/31/2024 @ 9AM.

## 2024-05-30 NOTE — PROGRESS NOTES
C3 nurse attempted to contact Miguel Moore  for a TCC post hospital discharge follow up call. The patient is unable to conduct the call @ this time. The patient requested a callback.    The patient has a scheduled HOSFU appointment with  on Raciel Raymond MD on 5/31/2024 @ 9AM.

## 2024-06-04 ENCOUNTER — OFFICE VISIT (OUTPATIENT)
Dept: VASCULAR SURGERY | Facility: CLINIC | Age: 69
End: 2024-06-04
Payer: MEDICARE

## 2024-06-04 ENCOUNTER — TELEPHONE (OUTPATIENT)
Dept: VASCULAR SURGERY | Facility: CLINIC | Age: 69
End: 2024-06-04
Payer: MEDICARE

## 2024-06-04 VITALS — WEIGHT: 175.94 LBS | HEIGHT: 68 IN | BODY MASS INDEX: 26.66 KG/M2

## 2024-06-04 DIAGNOSIS — I73.9 PERIPHERAL ARTERY DISEASE: Primary | ICD-10-CM

## 2024-06-04 DIAGNOSIS — T14.8XXA OPEN WOUND: ICD-10-CM

## 2024-06-04 PROCEDURE — 3288F FALL RISK ASSESSMENT DOCD: CPT | Mod: HCNC,CPTII,S$GLB, | Performed by: NURSE PRACTITIONER

## 2024-06-04 PROCEDURE — 1159F MED LIST DOCD IN RCRD: CPT | Mod: HCNC,CPTII,S$GLB, | Performed by: NURSE PRACTITIONER

## 2024-06-04 PROCEDURE — 1111F DSCHRG MED/CURRENT MED MERGE: CPT | Mod: HCNC,CPTII,S$GLB, | Performed by: NURSE PRACTITIONER

## 2024-06-04 PROCEDURE — 3072F LOW RISK FOR RETINOPATHY: CPT | Mod: HCNC,CPTII,S$GLB, | Performed by: NURSE PRACTITIONER

## 2024-06-04 PROCEDURE — 3008F BODY MASS INDEX DOCD: CPT | Mod: HCNC,CPTII,S$GLB, | Performed by: NURSE PRACTITIONER

## 2024-06-04 PROCEDURE — 1101F PT FALLS ASSESS-DOCD LE1/YR: CPT | Mod: HCNC,CPTII,S$GLB, | Performed by: NURSE PRACTITIONER

## 2024-06-04 PROCEDURE — 99215 OFFICE O/P EST HI 40 MIN: CPT | Mod: HCNC,S$GLB,, | Performed by: NURSE PRACTITIONER

## 2024-06-04 PROCEDURE — 1157F ADVNC CARE PLAN IN RCRD: CPT | Mod: HCNC,CPTII,S$GLB, | Performed by: NURSE PRACTITIONER

## 2024-06-04 PROCEDURE — 3066F NEPHROPATHY DOC TX: CPT | Mod: HCNC,CPTII,S$GLB, | Performed by: NURSE PRACTITIONER

## 2024-06-04 PROCEDURE — 99999 PR PBB SHADOW E&M-EST. PATIENT-LVL III: CPT | Mod: PBBFAC,HCNC,, | Performed by: NURSE PRACTITIONER

## 2024-06-04 PROCEDURE — 3044F HG A1C LEVEL LT 7.0%: CPT | Mod: HCNC,CPTII,S$GLB, | Performed by: NURSE PRACTITIONER

## 2024-06-04 PROCEDURE — 1126F AMNT PAIN NOTED NONE PRSNT: CPT | Mod: HCNC,CPTII,S$GLB, | Performed by: NURSE PRACTITIONER

## 2024-06-04 NOTE — TELEPHONE ENCOUNTER
Spoke with Jaelyn at wound care and to fax over LOBO results done on pt.recently. Will let provider know and scan into  once received.

## 2024-06-04 NOTE — PROGRESS NOTES
[facility]                         06/04/2024    HPI:  Miguel Moore is a 68 y.o. male with   Patient Active Problem List   Diagnosis    Benign essential HTN    Dyslipidemia    BPH (benign prostatic hyperplasia) 2/18/21 prostate bx normal    Seizure disorder as sequela of cerebrovascular accident    Hemiplegia and hemiparesis following cerebral infarction affecting right dominant side    Microcytosis 9/2019 labs c/w AoCD and AoCKD    ESRD (end stage renal disease)    Nuclear sclerosis, left    Cortical cataract of both eyes    DM type 2 without retinopathy    Secondary hyperparathyroidism of renal origin    Vitamin D deficiency    Right sided weakness    Senile nuclear sclerosis    CME (cystoid macular edema), bilateral    Refractive error    History of stroke    Type 2 diabetes mellitus with diabetic neuropathy, with long-term current use of insulin    History of fungal infection candida parasilosis hospitalization 8/2020    Chronic deep vein thrombosis (DVT) of popliteal vein of right lower extremity 9/21/20 outside [place]; unprovoked; anticoagulation    Pulmonary nodule 1/2021 4 mm nodule.    Elevated PSA 2/18/21 prostate bx normal    Anemia in chronic kidney disease    History of diabetic ulcer of foot    Pseudophakia    Bilateral posterior capsular opacification    Paroxysmal atrial fibrillation    Open wound    Peripheral artery disease 5/16/24 LE angio with angioplasty popliteal artery    Abdominal aortic atherosclerosis on CT 4/1/24    Open wound of right foot    Subacute osteomyelitis of right foot    Atherosclerosis of native artery of right lower extremity with ulceration    being managed by PCP and specialists who is in need for hospital follow up s/p RLE angiogram 5/16 with successful revascularization. Patient denies pain at this time. Patient with bilateral lower extremity open wounds slowly healing.        Past Medical History:   Diagnosis Date    Allergy     Clotting  disorder     Elaquis and APlavix    Deep vein thrombosis     Diabetes mellitus, type 2     Hypertension     Nuclear sclerosis of both eyes 6/9/2017    Renal disorder     Seizures     Stroke     Urinary tract infection      Past Surgical History:   Procedure Laterality Date    CATARACT EXTRACTION W/  INTRAOCULAR LENS IMPLANT Right 10/05/2017    Dr. Tay    CATARACT EXTRACTION W/  INTRAOCULAR LENS IMPLANT Left 10/19/2017    Dr. Tay    CYSTOSCOPY W/ RETROGRADES Bilateral 2/18/2021    Procedure: CYSTOSCOPY, WITH RETROGRADE PYELOGRAM;  Surgeon: JEMMA Styles MD;  Location: [facility] OR;  Service: Urology;  Laterality: Bilateral;  REQUESTING EARLY AS POSSIBE-LO / 2/8/2021 @ 1:13PM  RN Pre Op 2-11-21, Covid NEGATIVE ON  2-17-21.  C A    ESOPHAGOGASTRODUODENOSCOPY N/A 8/17/2020    Procedure: EGD (ESOPHAGOGASTRODUODENOSCOPY);  Surgeon: Desmond Chpaman MD;  Location: [facility];  Service: Endoscopy;  Laterality: N/A;    EYE SURGERY Bilateral     cataract    FEMORAL ARTERY STENT      FRACTURE SURGERY      INCISION AND DRAINAGE, LOWER EXTREMITY Right 4/4/2024    Procedure: INCISION AND DRAINAGE, LOWER EXTREMITY;  Surgeon: Jimbo Montilla III, MD;  Location: [facility] OR;  Service: Orthopedics;  Laterality: Right;    INCISION AND DRAINAGE, LOWER EXTREMITY Right 4/6/2024    Procedure: INCISION AND DRAINAGE, LOWER EXTREMITY;  Surgeon: Desmond Barillas MD;  Location: [facility] OR;  Service: Orthopedics;  Laterality: Right;  foot and ankle    INCISION AND DRAINAGE, LOWER EXTREMITY Right 4/9/2024    Procedure: INCISION AND DRAINAGE, LOWER EXTREMITY- FOOT & ANKLE;  Surgeon: Jimbo Montilla III, MD;  Location: [facility] OR;  Service: Orthopedics;  Laterality: Right;  CULTURES, VASCHE    INCISION AND DRAINAGE, LOWER EXTREMITY Right 5/21/2024    Procedure: INCISION AND DRAINAGE, LOWER EXTREMITY;  Surgeon: Jimbo Montilla III, MD;  Location: [facility] OR;  Service: Orthopedics;  Laterality: Right;  Pulsavac and Vashe    KNEE SURGERY       bilateral scope    WOUND DRESSING Right 4/9/2024    Procedure: WOUND VAC EXCHANGE;  Surgeon: Jimbo Montilla III, MD;  Location: [facility] OR;  Service: Orthopedics;  Laterality: Right;     Family History   Problem Relation Name Age of Onset    Diabetes Mother      Heart disease Mother      Hypertension Mother      Cataracts Mother      No Known Problems Father      Hypertension Sister      No Known Problems Brother      No Known Problems Maternal Aunt      No Known Problems Maternal Uncle      No Known Problems Paternal Aunt      No Known Problems Paternal Uncle      No Known Problems Maternal Grandmother      No Known Problems Maternal Grandfather      No Known Problems Paternal Grandmother      No Known Problems Paternal Grandfather      No Known Problems Daughter      No Known Problems Other      Amblyopia Neg Hx      Blindness Neg Hx      Cancer Neg Hx      Glaucoma Neg Hx      Macular degeneration Neg Hx      Retinal detachment Neg Hx      Strabismus Neg Hx      Stroke Neg Hx      Thyroid disease Neg Hx       Social History     Socioeconomic History    Marital status: Single   Occupational History    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, etc   Tobacco Use    Smoking status: Never    Smokeless tobacco: Never   Substance and Sexual Activity    Alcohol use: No    Drug use: No   Social History Narrative    Lives with daughter       Current Outpatient Medications:     apixaban (ELIQUIS) 5 mg Tab, Take 1 tablet (5 mg total) by mouth 2 (two) times daily., Disp: 180 tablet, Rfl: 3    aspirin (ECOTRIN) 81 MG EC tablet, Take 1 tablet (81 mg total) by mouth once daily., Disp: 90 tablet, Rfl: 3    atorvastatin (LIPITOR) 80 MG tablet, Take 1 tablet (80 mg total) by mouth once daily., Disp: 90 tablet, Rfl: 3    doxycycline (VIBRA-TABS) 100 MG tablet, Take 1 tablet (100 mg total) by mouth every 12 (twelve) hours., Disp: 26 tablet, Rfl: 0    ferrous gluconate (FERGON) 324 MG tablet, Take 1 tablet (324 mg  total) by mouth 2 (two) times daily with meals., Disp: 60 tablet, Rfl: 11    finasteride (PROSCAR) 5 mg tablet, Take 1 tablet (5 mg total) by mouth once daily., Disp: 90 tablet, Rfl: 3    labetaloL (NORMODYNE) 100 MG tablet, Take 1 tablet (100 mg total) by mouth once daily., Disp: 90 tablet, Rfl: 3    oxyCODONE-acetaminophen (PERCOCET)  mg per tablet, Take 1 tablet by mouth every 6 (six) hours as needed for Pain., Disp: 10 tablet, Rfl: 0    RENVELA 800 mg Tab, Take 1 tablet (800 mg total) by mouth 3 (three) times daily with meals., Disp: 90 tablet, Rfl: 0    tamsulosin (FLOMAX) 0.4 mg Cap, Take 2 capsules (0.8 mg total) by mouth once daily., Disp: 180 capsule, Rfl: 3    vitamin renal formula, B-complex-vitamin c-folic acid, (NEPHROCAP) 1 mg Cap, Take 1 capsule by mouth once daily., Disp: 30 capsule, Rfl: 11    iron sucrose in NS (VENOFER) 100 mg/100 mL PgBk, 50 mg. (Patient not taking: Reported on 5/30/2024), Disp: , Rfl:     methoxy peg-epoetin beta (MIRCERA INJ), 75 mcg. (Patient not taking: Reported on 5/30/2024), Disp: , Rfl:     mupirocin (BACTROBAN) 2 % ointment, Apply to affected area 3 times daily (Patient not taking: Reported on 5/30/2024), Disp: 22 g, Rfl: 1    REVIEW OF SYSTEMS:  General: No fevers or chills; ENT: No sore throat; Allergy and Immunology: no persistent infections; Hematological and Lymphatic: No history of bleeding or easy bruising; Endocrine: negative; Respiratory: no cough, shortness of breath, or wheezing; Cardiovascular: no chest pain or dyspnea on exertion; Gastrointestinal: no abdominal pain/back, change in bowel habits, or bloody stools; Genito-Urinary: no dysuria, trouble voiding, or hematuria; Musculoskeletal: negative; Neurological: no TIA or stroke symptoms; Psychiatric: no nervousness, anxiety or depression.    PHYSICAL EXAM:                General appearance:  Alert, well-appearing, and in no distress.  Oriented to person, place, and time                     "Neurological: Normal speech, no focal findings noted; CN II - XII grossly intact. BUE with sensation to light touch.            Musculoskeletal: Digits/nail without cyanosis/clubbing.  Strength 5/5 BUE.                    Neck: Supple                  Chest:   No use of accessory muscles               Cardiac: Normal rate and regular rhythm, S1 and S2 normal            Abdomen: Soft           Extremities:   Doppler pedal pulses. 1+ bilateral femoral pulse     no RUE edema, no LUE edema   Skin: Bilateral lower extremity wounds    LAB RESULTS:  No results found for: "CBC"  Lab Results   Component Value Date    LABPROT 12.3 05/14/2024    INR 1.1 05/14/2024     Lab Results   Component Value Date     (L) 05/24/2024    K 4.8 05/24/2024     05/24/2024    CO2 23 05/24/2024     05/24/2024    BUN 25 (H) 05/24/2024    CREATININE 6.5 (H) 05/24/2024    CALCIUM 8.1 (L) 05/24/2024    ANIONGAP 9 05/24/2024    EGFRNONAA 11 (A) 02/11/2021     Lab Results   Component Value Date    WBC 3.70 (L) 05/24/2024    RBC 3.52 (L) 05/24/2024    HGB 10.0 (L) 05/27/2024    HCT 29.2 (L) 05/24/2024    MCV 83 05/24/2024    MCH 25.9 (L) 05/24/2024    MCHC 31.2 (L) 05/24/2024    RDW 17.5 (H) 05/24/2024     05/24/2024    MPV 9.4 05/24/2024    GRAN 1.9 05/24/2024    GRAN 52.4 05/24/2024    LYMPH 1.0 05/24/2024    LYMPH 25.7 05/24/2024    MONO 0.5 05/24/2024    MONO 12.7 05/24/2024    EOS 0.3 05/24/2024    BASO 0.04 05/24/2024    EOSINOPHIL 7.8 05/24/2024    BASOPHIL 1.1 05/24/2024    DIFFMETHOD Automated 05/24/2024     .  Lab Results   Component Value Date    HGBA1C 5.8 (H) 03/20/2024       IMAGING:  All pertinent imaging has been reviewed and interpreted independently.    IMP/PLAN:  68 y.o. male with   Patient Active Problem List   Diagnosis    Benign essential HTN    Dyslipidemia    BPH (benign prostatic hyperplasia) 2/18/21 prostate bx normal    Seizure disorder as sequela of cerebrovascular accident    Hemiplegia and " hemiparesis following cerebral infarction affecting right dominant side    Microcytosis 9/2019 labs c/w AoCD and AoCKD    ESRD (end stage renal disease)    Nuclear sclerosis, left    Cortical cataract of both eyes    DM type 2 without retinopathy    Secondary hyperparathyroidism of renal origin    Vitamin D deficiency    Right sided weakness    Senile nuclear sclerosis    CME (cystoid macular edema), bilateral    Refractive error    History of stroke    Type 2 diabetes mellitus with diabetic neuropathy, with long-term current use of insulin    History of fungal infection candida parasilosis hospitalization 8/2020    Chronic deep vein thrombosis (DVT) of popliteal vein of right lower extremity 9[place]/20 outside [place]; unprovoked; anticoagulation    Pulmonary nodule 1/2021 4 mm nodule.    Elevated PSA 2/18/21 prostate bx normal    Anemia in chronic kidney disease    History of diabetic ulcer of foot    Pseudophakia    Bilateral posterior capsular opacification    Paroxysmal atrial fibrillation    Open wound    Peripheral artery disease 5/16/24 LE angio with angioplasty popliteal artery    Abdominal aortic atherosclerosis on CT 4/1/24    Open wound of right foot    Subacute osteomyelitis of right foot    Atherosclerosis of native artery of right lower extremity with ulceration    being managed by PCP and specialists who is here today for evaluation of hospital follow up s/p RLE angiogram with successful revascularization 5/16/24. Post op vascular studies with adequate flow to heal wounds. Wounds healing well. Continue wound care, offloading and HH. Wound clinic referral. RTC in 4-6 weeks for wound care check.       I spent 30 minutes evaluating this patient and greater than 50% of the time was spent counseling, coordinator care and discussing the plan of care.  All questions were answered and patient stated understanding with agreement with the above treatment plan.    Amelia Sofia NP  Vascular and Endovascular  Surgery

## 2024-06-04 NOTE — TELEPHONE ENCOUNTER
----- Message from Silke Macedo sent at 6/4/2024 10:48 AM CDT -----  Who called: solo [facility]       What is the request in detail: solo stated she have NIGEL and would like to know if provider wanted info about visit       Can the clinic reply by [facility]? No        Would the patient rather a call back or a response via [facility]? Call back        Best call back number:[phone]

## 2024-06-11 DIAGNOSIS — L03.115 CELLULITIS OF RIGHT FOOT: ICD-10-CM

## 2024-06-13 ENCOUNTER — TELEPHONE (OUTPATIENT)
Dept: UROLOGY | Facility: CLINIC | Age: 69
End: 2024-06-13
Payer: MEDICARE

## 2024-06-13 DIAGNOSIS — L02.419 LEG ABSCESS: ICD-10-CM

## 2024-06-13 NOTE — TELEPHONE ENCOUNTER
----- Message from Gita Lopes sent at 6/13/2024 12:49 PM CDT -----  Regarding: sooner appt  Name of caller:yas       What is the requesting detail: pt state he is experiencing pain when urinating.If not today, pt  is Requesting to be seen next week on a Tuesday or thursday, Please advise       Can the clinic reply by [facility]:       What number to call back: [phone]

## 2024-06-14 RX ORDER — OXYCODONE AND ACETAMINOPHEN 10; 325 MG/1; MG/1
1 TABLET ORAL EVERY 6 HOURS PRN
Qty: 10 TABLET | Refills: 0 | Status: SHIPPED | OUTPATIENT
Start: 2024-06-14

## 2024-06-18 ENCOUNTER — OFFICE VISIT (OUTPATIENT)
Dept: UROLOGY | Facility: CLINIC | Age: 69
End: 2024-06-18
Payer: MEDICARE

## 2024-06-18 DIAGNOSIS — R35.1 NOCTURIA MORE THAN TWICE PER NIGHT: ICD-10-CM

## 2024-06-18 DIAGNOSIS — R97.20 ELEVATED PSA: ICD-10-CM

## 2024-06-18 DIAGNOSIS — N13.8 BPH WITH OBSTRUCTION/LOWER URINARY TRACT SYMPTOMS: ICD-10-CM

## 2024-06-18 DIAGNOSIS — N40.1 BPH WITH OBSTRUCTION/LOWER URINARY TRACT SYMPTOMS: ICD-10-CM

## 2024-06-18 DIAGNOSIS — R39.89 SUSPECTED UTI: Primary | ICD-10-CM

## 2024-06-18 LAB — FUNGUS SPEC CULT: NORMAL

## 2024-06-18 PROCEDURE — 99999 PR PBB SHADOW E&M-EST. PATIENT-LVL III: CPT | Mod: PBBFAC,HCNC,, | Performed by: UROLOGY

## 2024-06-18 RX ORDER — CIPROFLOXACIN 500 MG/1
500 TABLET ORAL 2 TIMES DAILY
Qty: 14 TABLET | Refills: 0 | Status: SHIPPED | OUTPATIENT
Start: 2024-06-18 | End: 2024-06-25

## 2024-06-18 NOTE — PROGRESS NOTES
Subjective:       Patient ID: Miguel Moore is a 68 y.o. male The patient's last visit with me was on 2/27/2024.     Chief Complaint:   No chief complaint on file.    Hematuria  Patient complains of gross hematuria. Onset of hematuria was a few months ago and was gradual in onset. There is not a history of nephrolithiasis. There is not a history of urologic trauma. Other urologic symptoms include slow stream, hesitancy, intermittency, nocturia. Patient admits to history of no risk factors for cancer. Patient denies history of Agent Orange exposure, chronic Nunez catheter,  surgeries, occupational exposure, sexually transmitted diseases, tobacco use, trauma and urolithiasis. Prior workup has been none.  He had a CT in January when he was hospitalized with renal failure.    He had a Cystoscopy with simple UDS in 2017 by Dr. Chambers.  This showed an enlarged prostate and incomplete bladder emptying.    He is now on MWF dialysis.    9/15/2022  He had a UTI in August 2022.  He had a negative prostate biopsy in February 2021 for a PSA 10.2.  He thinks he has a UTI due to a strong smell and dark color.     5/2/2023  He has noted some straining for the past week.  The odor is off.    8/1/2023  He was given antibiotics last visit.  He is improved after antibiotics.  He has noted the odor is returning.      11/7/2023  He is back with a new PSA.  He tells me his stream is okay.  He is still taking Flomax and Proscar.    2/27/2024  He is back with a MRI.  He has noted an odor to his urine.  He feels he has an odor currently.    06/18/2024  He has noted more difficulty voiding.  He has also noted an odor to his urine.  He is taking Flomax 0.8 mg and Proscar.        ACTIVE MEDICAL ISSUES:  Patient Active Problem List   Diagnosis    Benign essential HTN    Dyslipidemia    BPH (benign prostatic hyperplasia) 2/18/21 prostate bx normal    Seizure disorder as sequela of cerebrovascular accident    Hemiplegia and hemiparesis  following cerebral infarction affecting right dominant side    Microcytosis 9/2019 labs c/w AoCD and AoCKD    ESRD (end stage renal disease)    Nuclear sclerosis, left    Cortical cataract of both eyes    DM type 2 without retinopathy    Secondary hyperparathyroidism of renal origin    Vitamin D deficiency    Right sided weakness    Senile nuclear sclerosis    CME (cystoid macular edema), bilateral    Refractive error    History of stroke    Type 2 diabetes mellitus with diabetic neuropathy, with long-term current use of insulin    History of fungal infection candida parasilosis hospitalization 8/2020    Chronic deep vein thrombosis (DVT) of popliteal vein of right lower extremity 9/[place]/20 [place]; unprovoked; anticoagulation    Pulmonary nodule 1/2021 4 mm nodule.    Elevated PSA 2/18/21 prostate bx normal    Anemia in chronic kidney disease    History of diabetic ulcer of foot    Pseudophakia    Bilateral posterior capsular opacification    Paroxysmal atrial fibrillation    Open wound    Peripheral artery disease 5/16/24 LE angio with angioplasty popliteal artery    Abdominal aortic atherosclerosis on CT 4/1/24    Open wound of right foot    Subacute osteomyelitis of right foot    Atherosclerosis of native artery of right lower extremity with ulceration       ALLERGIES AND MEDICATIONS: updated and reviewed.  Review of patient's allergies indicates:   Allergen Reactions    Ace inhibitors      Hyperkalemia 8/2018  Other reaction(s): Unknown    Penicillins Hives     Tolerated cefepime and cefdinir previously    Tizanidine      Felt hot     Current Outpatient Medications   Medication Sig    apixaban (ELIQUIS) 5 mg Tab Take 1 tablet (5 mg total) by mouth 2 (two) times daily.    aspirin (ECOTRIN) 81 MG EC tablet Take 1 tablet (81 mg total) by mouth once daily.    atorvastatin (LIPITOR) 80 MG tablet Take 1 tablet (80 mg total) by mouth once daily.    ciprofloxacin HCl (CIPRO) 500 MG tablet Take 1 tablet (500 mg total)  by mouth 2 (two) times daily. for 7 days    doxycycline (VIBRA-TABS) 100 MG tablet Take 1 tablet (100 mg total) by mouth every 12 (twelve) hours.    ferrous gluconate (FERGON) 324 MG tablet Take 1 tablet (324 mg total) by mouth 2 (two) times daily with meals.    finasteride (PROSCAR) 5 mg tablet Take 1 tablet (5 mg total) by mouth once daily.    iron sucrose in NS (VENOFER) 100 mg/100 mL PgBk 50 mg. (Patient not taking: Reported on 5/30/2024)    labetaloL (NORMODYNE) 100 MG tablet Take 1 tablet (100 mg total) by mouth once daily.    methoxy peg-epoetin beta (MIRCERA INJ) 75 mcg. (Patient not taking: Reported on 5/30/2024)    mupirocin (BACTROBAN) 2 % ointment Apply to affected area 3 times daily (Patient not taking: Reported on 5/30/2024)    oxyCODONE-acetaminophen (PERCOCET)  mg per tablet Take 1 tablet by mouth every 6 (six) hours as needed for Pain.    RENVELA 800 mg Tab Take 1 tablet (800 mg total) by mouth 3 (three) times daily with meals.    tamsulosin (FLOMAX) 0.4 mg Cap Take 2 capsules (0.8 mg total) by mouth once daily.    vitamin renal formula, B-complex-vitamin c-folic acid, (NEPHROCAP) 1 mg Cap Take 1 capsule by mouth once daily.     No current facility-administered medications for this visit.       Review of Systems   Constitutional:  Negative for activity change, fatigue, fever and unexpected weight change.   HENT:  Negative for congestion.    Eyes:  Negative for redness.   Respiratory:  Negative for chest tightness and shortness of breath.    Cardiovascular:  Negative for chest pain and leg swelling.   Gastrointestinal:  Negative for abdominal pain, constipation, diarrhea, nausea and vomiting.   Genitourinary:  Negative for dysuria, flank pain, frequency, hematuria, penile pain, penile swelling, scrotal swelling, testicular pain and urgency.   Musculoskeletal:  Negative for arthralgias and back pain.   Neurological:  Negative for dizziness and light-headedness.   Psychiatric/Behavioral:   Negative for behavioral problems and confusion. The patient is not nervous/anxious.    All other systems reviewed and are negative.      Objective:      There were no vitals filed for this visit.      Physical Exam  Vitals and nursing note reviewed.   Constitutional:       Appearance: He is well-developed.   HENT:      Head: Normocephalic.   Eyes:      Conjunctiva/sclera: Conjunctivae normal.   Neck:      Thyroid: No thyromegaly.      Trachea: No tracheal deviation.   Cardiovascular:      Rate and Rhythm: Normal rate.      Heart sounds: Normal heart sounds.   Pulmonary:      Effort: Pulmonary effort is normal. No respiratory distress.      Breath sounds: Normal breath sounds. No wheezing.   Abdominal:      General: Bowel sounds are normal.      Palpations: Abdomen is soft.      Tenderness: There is no abdominal tenderness. There is no rebound.      Hernia: No hernia is present.   Musculoskeletal:         General: No tenderness. Normal range of motion.      Cervical back: Normal range of motion and neck supple.   Lymphadenopathy:      Cervical: No cervical adenopathy.   Skin:     General: Skin is warm and dry.      Findings: No erythema or rash.   Neurological:      Mental Status: He is alert and oriented to person, place, and time.   Psychiatric:         Behavior: Behavior normal.         Thought Content: Thought content normal.         Judgment: Judgment normal.         Urine dipstick shows not done.  Micro exam: not done.     Component Ref Range & Units 12 d ago 2 yr ago   PSA Diagnostic 0.00 - 4.00 ng/mL 13.7 High   16.7 High  CM    Comment: The testing method is a chemiluminescent microparticle immunoassay   manufactured by [employer] and performed on the [employer]   or   [employer] system. Values obtained with different assay manufacturers   for   methods may be different and cannot be used interchangeably.   PSA Expected levels:   Hormonal Therapy: <0.05 ng/ml   Prostatectomy: <0.01 ng/ml   Radiation  Therapy: <1.00 ng/ml    Resulting Agency  [facility]              Specimen Collected: 10/26/23 14:39 Last Resulted: 10/26/23 21:13           MRI Pelvis Without Contrast  Order: [id number]  Status: Final result       Visible to patient: No (inaccessible in Patient Portal)       Next appt: Today at 02:30 PM in Urology (Lee Styles MD)       Dx: Elevated PSA    1 Result Note  Details    Reading Physician Reading Date Result Priority   Wander Ontiveros MD  [phone] 1/25/2024 Routine     Narrative & Impression  EXAMINATION:  MRI PELVIS WITHOUT CONTRAST     CLINICAL HISTORY:  Prostate cancer suspected;  Elevated prostate specific antigen (PSA)     Additional history: None provided.     TECHNIQUE:  Multiparametric MRI of the prostate/pelvis performed on a 3T scanner with phase pelvic coil. Multiplanar, multisequence images including high resolution, small field-of-view T2-WI; axial diffusion weighted images with multiple B-values and creation of ADC-maps; and dynamic contrast enhanced T1-weighted images through the prostate were obtained without contrast.     COMPARISON:  CT 01/06/2021     FINDINGS:  Previous biopsy: Negative biopsy 02/18/2021     PSA: 13.7 ng/mL 10/26/2023     Prior therapy: None     Prostate: 5.6 x 5.5 x 7.2 cm corresponding to a computed volume of 116 cc.     Peripheral zone: Diffuse thinning.  No focal abnormalities with imaging features concerning for prostate cancer, score 1.     Transitional zone: Benign prostatic hyperplasia without focal suspicious abnormality, score 2.  Superior protrusion of the prostate at the bladder base.     Neurovascular bundle: Normal appearance.     Seminal vesicles: T1 signal hyperintensity within the right seminal vesicle which could represent hemorrhagic or proteinaceous material.     Adjacent Organ Involvement: No evidence for urinary bladder or rectal invasion.     Lymphadenopathy: None.     Other Findings: Circumferential bladder wall thickening,  likely sequela of chronic outlet obstruction.  Trace pelvic free fluid.  Nonspecific diffuse heterogeneous marrow signal.     Impression:     1. No suspicious prostate lesion.  2. BPH.  3. Signal within the right seminal vesicle may represent hemorrhagic or proteinaceous material.  4. Additional findings as above.  Overall Assessment: PI-RADS 2 - Low (clinically significant cancer is unlikely to be present)     Number of targets created for potential MR/US fusion biopsy     Peripheral zone: 0     Transition zone: 0        Electronically signed by: Wander Ontiveros  Date:                                            01/25/2024  Time:                                           16:51           Exam Ended: 01/25/24 16:30 CST             Assessment:       1. Suspected UTI    2. Nocturia more than twice per night    3. BPH with obstruction/lower urinary tract symptoms    4. Elevated PSA                    Plan:       1. Nocturia more than twice per night  Flomax    2. BPH with obstruction/lower urinary tract symptoms  Consider outlet procedure  - Cystoscopy; Future    3. Elevated PSA  Recheck later this year    4. Suspected UTI    - ciprofloxacin HCl (CIPRO) 500 MG tablet; Take 1 tablet (500 mg total) by mouth 2 (two) times daily. for 7 days  Dispense: 14 tablet; Refill: 0             Follow up in about 3 months (around 9/18/2024) for Cystoscopy.

## 2024-06-24 DIAGNOSIS — L02.419 LEG ABSCESS: ICD-10-CM

## 2024-06-24 RX ORDER — OXYCODONE AND ACETAMINOPHEN 10; 325 MG/1; MG/1
1 TABLET ORAL EVERY 6 HOURS PRN
Qty: 10 TABLET | Refills: 0 | Status: SHIPPED | OUTPATIENT
Start: 2024-06-24

## 2024-06-24 NOTE — TELEPHONE ENCOUNTER
Spoke with pt, the messages requested refill for Lipitor, but patient is actually in need of percocet refill.  Request to be sent to [facility]

## 2024-06-24 NOTE — TELEPHONE ENCOUNTER
----- Message from Forrest Bermudez sent at 6/24/2024 12:58 PM CDT -----  Regarding: self  Type: Patient Call Back    Who called:self    What is the request in detail:pt is calling to get an earlier appt time if possible with this dr, he missed his appt     Can the clinic reply by [facility]?no    Would the patient rather a call back or a response via My [facility]? callback    Best call back number:[phone]    Additional Information:

## 2024-06-24 NOTE — TELEPHONE ENCOUNTER
No care due was identified.  [facility] Embedded Care Due Messages. Reference number: [id number].   6/24/2024 4:32:13 PM CDT

## 2024-06-24 NOTE — TELEPHONE ENCOUNTER
----- Message from Heydi Adams sent at 6/24/2024  4:35 PM CDT -----  Regarding: Refill Request  Type: RX Refill Request      Who Called: Self       Refill or New Rx: refill       RX Name and Strength:  Disp Refills Start End UNIQUE  atorvastatin (LIPITOR) 80 MG tablet             Preferred Pharmacy with phone number:  [facility]     Would the patient rather a call back or a response via My [facility]? Call       Best Call Back Number: .[phone]

## 2024-06-25 ENCOUNTER — DOCUMENT SCAN (OUTPATIENT)
Dept: HOME HEALTH SERVICES | Facility: HOSPITAL | Age: 69
End: 2024-06-25
Payer: MEDICARE

## 2024-07-03 ENCOUNTER — TELEPHONE (OUTPATIENT)
Dept: UROLOGY | Facility: CLINIC | Age: 69
End: 2024-07-03
Payer: MEDICARE

## 2024-07-03 NOTE — TELEPHONE ENCOUNTER
Pt was contacted appt scheduled.   ----- Message from Radha Garza sent at 7/3/2024  9:15 AM CDT -----  Regarding: self  Type:  Sooner Appointment Request     Patient is requesting a sooner appointment.  Patient declined first available appointment listed as well as another facility and provider .  Patient will not accept being placed on the waitlist and is requesting a message be sent to doctor.     Name of Caller:self     When is the first available appointment?July 30     Symptoms:difficulty urinating, he is asking to be seen Friday if possible     Would the patient rather a call back or a response via My [facility]?call     Best Call Back Number: [phone]       Additional Information:

## 2024-07-08 ENCOUNTER — TELEPHONE (OUTPATIENT)
Dept: UROLOGY | Facility: CLINIC | Age: 69
End: 2024-07-08
Payer: MEDICARE

## 2024-07-08 ENCOUNTER — HOSPITAL ENCOUNTER (EMERGENCY)
Facility: HOSPITAL | Age: 69
Discharge: HOME | End: 2024-07-08
Attending: EMERGENCY MEDICINE
Payer: MEDICARE

## 2024-07-08 VITALS
DIASTOLIC BLOOD PRESSURE: 74 MMHG | OXYGEN SATURATION: 100 % | RESPIRATION RATE: 18 BRPM | SYSTOLIC BLOOD PRESSURE: 162 MMHG | HEART RATE: 79 BPM | HEIGHT: 68 IN | TEMPERATURE: 98 F | WEIGHT: 170 LBS | BODY MASS INDEX: 25.76 KG/M2

## 2024-07-08 DIAGNOSIS — K59.00 CONSTIPATION, UNSPECIFIED CONSTIPATION TYPE: Primary | ICD-10-CM

## 2024-07-08 DIAGNOSIS — I10 HYPERTENSION, UNSPECIFIED TYPE: ICD-10-CM

## 2024-07-08 DIAGNOSIS — R30.0 DYSURIA: ICD-10-CM

## 2024-07-08 DIAGNOSIS — S91.301A OPEN WOUND OF RIGHT FOOT: ICD-10-CM

## 2024-07-08 DIAGNOSIS — K59.00 CONSTIPATION: ICD-10-CM

## 2024-07-08 LAB
ALBUMIN SERPL BCP-MCNC: 3.1 G/DL (ref 3.5–5.2)
ALP SERPL-CCNC: 62 U/L (ref 55–135)
ALT SERPL W/O P-5'-P-CCNC: 6 U/L (ref 10–44)
ANION GAP SERPL CALC-SCNC: 16 MMOL/L (ref 8–16)
AST SERPL-CCNC: 10 U/L (ref 10–40)
BASOPHILS # BLD AUTO: 0.02 K/UL (ref 0–0.2)
BASOPHILS NFR BLD: 0.5 % (ref 0–1.9)
BILIRUB SERPL-MCNC: 0.4 MG/DL (ref 0.1–1)
BUN SERPL-MCNC: 40 MG/DL (ref 8–23)
CALCIUM SERPL-MCNC: 9.5 MG/DL (ref 8.7–10.5)
CHLORIDE SERPL-SCNC: 97 MMOL/L (ref 95–110)
CO2 SERPL-SCNC: 23 MMOL/L (ref 23–29)
CREAT SERPL-MCNC: 7.3 MG/DL (ref 0.5–1.4)
DIFFERENTIAL METHOD BLD: ABNORMAL
EOSINOPHIL # BLD AUTO: 0.2 K/UL (ref 0–0.5)
EOSINOPHIL NFR BLD: 6 % (ref 0–8)
ERYTHROCYTE [DISTWIDTH] IN BLOOD BY AUTOMATED COUNT: 16.9 % (ref 11.5–14.5)
EST. GFR  (NO RACE VARIABLE): 7 ML/MIN/1.73 M^2
GLUCOSE SERPL-MCNC: 126 MG/DL (ref 70–110)
HCT VFR BLD AUTO: 36.4 % (ref 40–54)
HGB BLD-MCNC: 12 G/DL (ref 14–18)
IMM GRANULOCYTES # BLD AUTO: 0.01 K/UL (ref 0–0.04)
IMM GRANULOCYTES NFR BLD AUTO: 0.3 % (ref 0–0.5)
LYMPHOCYTES # BLD AUTO: 0.7 K/UL (ref 1–4.8)
LYMPHOCYTES NFR BLD: 18.6 % (ref 18–48)
MAGNESIUM SERPL-MCNC: 2.1 MG/DL (ref 1.6–2.6)
MCH RBC QN AUTO: 24.7 PG (ref 27–31)
MCHC RBC AUTO-ENTMCNC: 33 G/DL (ref 32–36)
MCV RBC AUTO: 75 FL (ref 82–98)
MONOCYTES # BLD AUTO: 0.4 K/UL (ref 0.3–1)
MONOCYTES NFR BLD: 10 % (ref 4–15)
NEUTROPHILS # BLD AUTO: 2.5 K/UL (ref 1.8–7.7)
NEUTROPHILS NFR BLD: 64.6 % (ref 38–73)
NRBC BLD-RTO: 0 /100 WBC
PHOSPHATE SERPL-MCNC: 3.4 MG/DL (ref 2.7–4.5)
PLATELET # BLD AUTO: ABNORMAL K/UL (ref 150–450)
PLATELET BLD QL SMEAR: ABNORMAL
PMV BLD AUTO: ABNORMAL FL (ref 9.2–12.9)
POCT GLUCOSE: 143 MG/DL (ref 70–110)
POTASSIUM SERPL-SCNC: 4.5 MMOL/L (ref 3.5–5.1)
PROT SERPL-MCNC: 7.3 G/DL (ref 6–8.4)
RBC # BLD AUTO: 4.85 M/UL (ref 4.6–6.2)
SODIUM SERPL-SCNC: 136 MMOL/L (ref 136–145)
WBC # BLD AUTO: 3.81 K/UL (ref 3.9–12.7)

## 2024-07-08 PROCEDURE — 84100 ASSAY OF PHOSPHORUS: CPT | Mod: HCNC | Performed by: NURSE PRACTITIONER

## 2024-07-08 PROCEDURE — 82962 GLUCOSE BLOOD TEST: CPT | Mod: HCNC

## 2024-07-08 PROCEDURE — 99284 EMERGENCY DEPT VISIT MOD MDM: CPT | Mod: 25,HCNC

## 2024-07-08 PROCEDURE — 80053 COMPREHEN METABOLIC PANEL: CPT | Mod: HCNC | Performed by: NURSE PRACTITIONER

## 2024-07-08 PROCEDURE — 83735 ASSAY OF MAGNESIUM: CPT | Mod: HCNC | Performed by: NURSE PRACTITIONER

## 2024-07-08 PROCEDURE — 85025 COMPLETE CBC W/AUTO DIFF WBC: CPT | Mod: HCNC | Performed by: NURSE PRACTITIONER

## 2024-07-08 PROCEDURE — 25000003 PHARM REV CODE 250: Mod: HCNC | Performed by: NURSE PRACTITIONER

## 2024-07-08 RX ORDER — SULFAMETHOXAZOLE AND TRIMETHOPRIM 800; 160 MG/1; MG/1
1 TABLET ORAL
Status: COMPLETED | OUTPATIENT
Start: 2024-07-08 | End: 2024-07-08

## 2024-07-08 RX ORDER — AMLODIPINE BESYLATE 5 MG/1
10 TABLET ORAL
Status: COMPLETED | OUTPATIENT
Start: 2024-07-08 | End: 2024-07-08

## 2024-07-08 RX ORDER — AMLODIPINE BESYLATE 10 MG/1
10 TABLET ORAL DAILY
COMMUNITY

## 2024-07-08 RX ORDER — SULFAMETHOXAZOLE AND TRIMETHOPRIM 400; 80 MG/1; MG/1
1 TABLET ORAL DAILY
Qty: 10 TABLET | Refills: 0 | Status: SHIPPED | OUTPATIENT
Start: 2024-07-08

## 2024-07-08 RX ORDER — LACTULOSE 10 G/15ML
20 SOLUTION ORAL
Status: DISCONTINUED | OUTPATIENT
Start: 2024-07-08 | End: 2024-07-08

## 2024-07-08 RX ADMIN — SULFAMETHOXAZOLE AND TRIMETHOPRIM 1 TABLET: 800; 160 TABLET ORAL at 05:07

## 2024-07-08 RX ADMIN — AMLODIPINE BESYLATE 10 MG: 5 TABLET ORAL at 07:07

## 2024-07-08 NOTE — ED PROVIDER NOTES
Encounter Date: 7/8/2024       History     Chief Complaint   Patient presents with    Dysuria     Lower abd pain with dysuria x 2 days. Patient reports small BM yesterday. Patient was at dialysis today, but did not complete treatment due to pain. Patient reports feeling bloated, did not take anything for pain.      Chief complaint: Constipation and dysuria     History of present illness: Patient is a 68-year-old male who presents the emergency department after 2 days of dysuria frequency and urgency.  He is bed-bound secondary to right foot and right arm contractures.  He has end-stage renal disease and is on Monday Wednesday and Friday dialysis.  He states he did not get his full treatment today only wet for 1-2 hours.  He endorses his last bowel movement was either 1 or 2 days ago and feels constipated.  Denies fever nausea vomiting.    The history is provided by the patient. No  was used.     Review of patient's allergies indicates:   Allergen Reactions    Ace inhibitors      Hyperkalemia 8/2018  Other reaction(s): Unknown    Penicillins Hives     Tolerated cefepime and cefdinir previously    Tizanidine      Felt hot     Past Medical History:   Diagnosis Date    Allergy     Clotting disorder     Elaquis and APlavix    Deep vein thrombosis     Diabetes mellitus, type 2     Hypertension     Nuclear sclerosis of both eyes 6/9/2017    Renal disorder     Seizures     Stroke     Urinary tract infection      Past Surgical History:   Procedure Laterality Date    CATARACT EXTRACTION W/  INTRAOCULAR LENS IMPLANT Right 10/05/2017    Dr. Tay    CATARACT EXTRACTION W/  INTRAOCULAR LENS IMPLANT Left 10/19/2017    Dr. Tay    CYSTOSCOPY W/ RETROGRADES Bilateral 2/18/2021    Procedure: CYSTOSCOPY, WITH RETROGRADE PYELOGRAM;  Surgeon: JEMMA Styles MD;  Location: [facility];  Service: Urology;  Laterality: Bilateral;  REQUESTING EARLY AS POSSIBE-LO / 2/8/2021 @ 1:13PM  RN Pre Op 2-11-21, Covid  NEGATIVE ON  2-17-21.  C A    ESOPHAGOGASTRODUODENOSCOPY N/A 8/17/2020    Procedure: EGD (ESOPHAGOGASTRODUODENOSCOPY);  Surgeon: Desmond Chapman MD;  Location: [facility] ENDO;  Service: Endoscopy;  Laterality: N/A;    EYE SURGERY Bilateral     cataract    FEMORAL ARTERY STENT      FRACTURE SURGERY      INCISION AND DRAINAGE, LOWER EXTREMITY Right 4/4/2024    Procedure: INCISION AND DRAINAGE, LOWER EXTREMITY;  Surgeon: Jimbo Montilla III, MD;  Location: [facility] OR;  Service: Orthopedics;  Laterality: Right;    INCISION AND DRAINAGE, LOWER EXTREMITY Right 4/6/2024    Procedure: INCISION AND DRAINAGE, LOWER EXTREMITY;  Surgeon: Desmond Barillas MD;  Location: [facility] OR;  Service: Orthopedics;  Laterality: Right;  foot and ankle    INCISION AND DRAINAGE, LOWER EXTREMITY Right 4/9/2024    Procedure: INCISION AND DRAINAGE, LOWER EXTREMITY- FOOT & ANKLE;  Surgeon: Jimbo Montilla III, MD;  Location: [facility] OR;  Service: Orthopedics;  Laterality: Right;  CULTURES, VASCHE    INCISION AND DRAINAGE, LOWER EXTREMITY Right 5/21/2024    Procedure: INCISION AND DRAINAGE, LOWER EXTREMITY;  Surgeon: Jimbo Montilla III, MD;  Location: [facility] OR;  Service: Orthopedics;  Laterality: Right;  Pulsavac and Vashe    KNEE SURGERY      bilateral scope    WOUND DRESSING Right 4/9/2024    Procedure: WOUND VAC EXCHANGE;  Surgeon: Jimbo Montilla III, MD;  Location: [facility] OR;  Service: Orthopedics;  Laterality: Right;     Family History   Problem Relation Name Age of Onset    Diabetes Mother      Heart disease Mother      Hypertension Mother      Cataracts Mother      No Known Problems Father      Hypertension Sister      No Known Problems Brother      No Known Problems Maternal Aunt      No Known Problems Maternal Uncle      No Known Problems Paternal Aunt      No Known Problems Paternal Uncle      No Known Problems Maternal Grandmother      No Known Problems Maternal Grandfather      No Known Problems Paternal Grandmother      No Known Problems  Paternal Grandfather      No Known Problems Daughter      No Known Problems Other      Amblyopia Neg Hx      Blindness Neg Hx      Cancer Neg Hx      Glaucoma Neg Hx      Macular degeneration Neg Hx      Retinal detachment Neg Hx      Strabismus Neg Hx      Stroke Neg Hx      Thyroid disease Neg Hx       Social History     Tobacco Use    Smoking status: Never    Smokeless tobacco: Never   Substance Use Topics    Alcohol use: No    Drug use: No     Review of Systems   Constitutional:  Negative for appetite change, chills, diaphoresis, fatigue and fever.   HENT:  Negative for congestion, ear discharge, ear pain, postnasal drip, rhinorrhea, sinus pressure, sneezing, sore throat and voice change.    Eyes:  Negative for discharge, itching and visual disturbance.   Respiratory:  Negative for cough, shortness of breath and wheezing.    Cardiovascular:  Negative for chest pain, palpitations and leg swelling.   Gastrointestinal:  Positive for constipation. Negative for abdominal pain, nausea and vomiting.   Endocrine: Negative for polydipsia, polyphagia and polyuria.   Genitourinary:  Positive for dysuria, frequency and urgency. Negative for difficulty urinating, hematuria, penile discharge, penile pain and penile swelling.   Musculoskeletal:  Negative for arthralgias and myalgias.   Skin:  Negative for rash and wound.   Neurological:  Negative for dizziness, seizures, syncope and weakness.   Hematological:  Negative for adenopathy. Does not bruise/bleed easily.   Psychiatric/Behavioral:  Negative for agitation and self-injury. The patient is not nervous/anxious.        Physical Exam     Initial Vitals [07/08/24 1115]   BP Pulse Resp Temp SpO2   (!) 142/72 95 20 97.8 °F (36.6 °C) 100 %      MAP       --         Physical Exam    Nursing note and vitals reviewed.  Constitutional: He appears well-developed and well-nourished. He is not diaphoretic. No distress.   HENT:   Head: Normocephalic and atraumatic.   Right Ear: External  ear normal.   Left Ear: External ear normal.   Nose: Nose normal.   Eyes: Pupils are equal, round, and reactive to light. Right eye exhibits no discharge. Left eye exhibits no discharge. No scleral icterus.   Neck:   Normal range of motion.  Pulmonary/Chest: No respiratory distress.   Abdominal: He exhibits no distension. There is no abdominal tenderness. There is guarding.   Musculoskeletal:         General: Normal range of motion.      Cervical back: Normal range of motion.      Comments: Right upper arm dialysis access with a positive thrill and bruit.  Contracture of the right foot and right arm.     Neurological: He is alert and oriented to person, place, and time.   Skin: Skin is dry. Capillary refill takes less than 2 seconds.   There to chronic ulcers to the right foot.  There is no redness or warmth surrounding no exudate at this time.         ED Course   Procedures  Labs Reviewed   CBC W/ AUTO DIFFERENTIAL - Abnormal; Notable for the following components:       Result Value    WBC 3.81 (*)     Hemoglobin 12.0 (*)     Hematocrit 36.4 (*)     MCV 75 (*)     MCH 24.7 (*)     RDW 16.9 (*)     Lymph # 0.7 (*)     Platelet Estimate Clumped (*)     All other components within normal limits   COMPREHENSIVE METABOLIC PANEL - Abnormal; Notable for the following components:    Glucose 126 (*)     BUN 40 (*)     Creatinine 7.3 (*)     Albumin 3.1 (*)     ALT 6 (*)     eGFR 7 (*)     All other components within normal limits   POCT GLUCOSE - Abnormal; Notable for the following components:    POCT Glucose 143 (*)     All other components within normal limits   MAGNESIUM   PHOSPHORUS   URINALYSIS, REFLEX TO URINE CULTURE   POCT GLUCOSE MONITORING CONTINUOUS          Imaging Results              X-Ray Foot Complete Right (Final result)  Result time 07/08/24 12:18:51      Final result by Tien Harrison III, MD (07/08/24 12:18:51)                   Impression:      Ulcer craters plantar aspect of the foot.  No definite  bone destruction seen.      Electronically signed by: Tien Harrison MD  Date:    07/08/2024  Time:    12:18               Narrative:    EXAMINATION:  XR FOOT COMPLETE 3 VIEW RIGHT    CLINICAL HISTORY:  Unspecified open wound, right foot, initial encounter    FINDINGS:  Three views foot right:    There are plantar ulcer craters.  There are diabetic vascular calcifications.  There is DJD.  There is an os trigonum.  No fracture dislocation bone destruction seen.  MRI could be helpful for further evaluation.  There is a hallux valgus deformity.                                       X-Ray Abdomen AP 1 View (KUB) (Final result)  Result time 07/08/24 12:19:20      Final result by Tien Harrison III, MD (07/08/24 12:19:20)                   Narrative:    EXAMINATION:  XR ABDOMEN AP 1 VIEW    CLINICAL HISTORY:  Constipation, unspecified    FINDINGS:  Abdomen one view: Lung bases are clear.  No obstruction, ileus, or perforation seen.  There is mild-moderate constipation.      Electronically signed by: Tien Harrison MD  Date:    07/08/2024  Time:    12:19                                     Medications   sulfamethoxazole-trimethoprim 800-160mg per tablet 1 tablet (1 tablet Oral Given 7/8/24 1732)   amLODIPine tablet 10 mg (10 mg Oral Given 7/8/24 1952)     Medical Decision Making  Patient is a 68-year-old male who presents the emergency department after 2 days of dysuria frequency and urgency.  He is bed-bound secondary to right foot and right arm contractures.  He has end-stage renal disease and is on Monday Wednesday and Friday dialysis.  He states he did not get his full treatment today only wet for 1-2 hours.  He endorses his last bowel movement was either 1 or 2 days ago and feels constipated.  Denies fever nausea vomiting.    On physical exam the patient is afebrile nontoxic in no apparent distress breath sounds are clear to auscultation heart sounds without abnormality.  There is a left upper arm dialysis access  "with a positive thrill and bruit.  The right arm and right leg are contracted.  The right foot has 2 large ulcers without signs of current infection.  The abdomen is nontender but there is guarding.      Differential diagnosis includes constipation UTI urinary retention wound infection    Problems Addressed:  Constipation: acute illness or injury     Details: Resolved while in the ER.  Dysuria: acute illness or injury     Details: Unable to ascertain a sample of urine.  I consulted Dr. Styles who recommended treating empirically and follow up with him in 1-2 weeks in clinic.  Hypertension, unspecified type: acute illness or injury     Details: Self resolved following administration of amlodipine in the emergency department.  Open wound of right foot: chronic illness or injury    Amount and/or Complexity of Data Reviewed  Labs: ordered. Decision-making details documented in ED Course.  Radiology: ordered. Decision-making details documented in ED Course.  Discussion of management or test interpretation with external provider(s): Vital signs at the time of disposition were:  BP (!) 175/87   Pulse 79   Temp 97.8 °F (36.6 °C) (Oral)   Resp 18   Ht 5' 8" (1.727 m)   Wt 77.1 kg (170 lb)   SpO2 100%   BMI 25.85 kg/m²       See AVS for additional recommendations. Medications listed herein were prescribed after reviewing the patient's allergies, medication list, history, most recent laboratories as available.  Referrals below were provided after reviewing the patient's previous medical providers. He understands he  should return for any worsening or changes in condition.  Prior to discharge the patient was asked if he  had any additional concerns or complaints and he declined. The patient was given an opportunity to ask questions and all were answered to his satisfaction.     Risk  Prescription drug management.               ED Course as of 07/08/24 2013 Mon Jul 08, 2024   1146 POCT Glucose(!): 143 [VC]   1146 " BP(!): 142/72 [VC]   1146 Temp: 97.8 °F (36.6 °C) [VC]   1146 Temp Source: Oral [VC]   1146 Pulse: 95 [VC]   1146 Resp: 20 [VC]   1146 SpO2: 100 % [VC]   1221 X-Ray Foot Complete Right    Ulcer craters plantar aspect of the foot.  No definite bone destruction seen.    [VC]   1225 X-Ray Abdomen AP 1 View (KUB)  Abdomen one view: Lung bases are clear.  No obstruction, ileus, or perforation seen.  There is mild-moderate constipation.      [VC]   1236 BP(!): 157/85 [VC]   1236 Resp(!): 21 [VC]   1236 Pulse: 87 [VC]   1410 Pt reportedly had large bowel movement and feels better. [VC]   1413 Phosphorus Level: 3.4 [VC]   1414 Magnesium : 2.1 [VC]   1414 Comprehensive metabolic panel(!)  Known esrd. Nonemergent cmp. [VC]   1420 CBC auto differential(!)  Reduced wbc, mild anemia normal otherwise. [VC]   1554 BP(!): 149/65 [VC]   1554 Pulse: 74 [VC]   1554 Resp: 12 [VC]   1554 SpO2: 100 % [VC]   1613 BP(!): 148/65 [VC]   1613 Pulse: 71 [VC]   1613 Resp: 11 [VC]   1613 SpO2: 100 % [VC]   1719 Wrap up conversation: I explained what we had done for the patient and that I would consulted Dr. Styles with Urology who recommended empiric treatment for urinary tract infection.  The patient appears to have been on doxycycline in the last 3 months therefore I have chosen to treat with Bactrim.  Per up-to-date as the patient would normally be on 1 ds tablet twice a day correct dosing is 1 ds tablet once followed by 1 single strength tablet every 12 or 24 hours.  I have chosen every 24 hours.  His constipation was self resolved in the ED.  He is discharged home to follow up with primary care and Dr. Styles in 1-2 weeks.  Return for worsening or changes in condition otherwise continue dialysis treatments as directed. [VC]   1940 BP(!): 170/74 [VC]   1940 Pulse: 78 [VC]   1940 Resp: 20 [VC]   1940 SpO2: 99 % [VC]      ED Course User Index  [VC] Eugenio Brady, JOLEEN                           Clinical Impression:  Final  diagnoses:  [S91.301A] Open wound of right foot  [K59.00] Constipation  [K59.00] Constipation, unspecified constipation type (Primary)  [R30.0] Dysuria  [I10] Hypertension, unspecified type          ED Disposition Condition    Discharge Stable          ED Prescriptions       Medication Sig Dispense Start Date End Date Auth. Provider    sulfamethoxazole-trimethoprim 400-80mg (BACTRIM,SEPTRA) 400-80 mg per tablet Take 1 tablet by mouth once daily. 10 tablet 7/8/2024 -- Eugenio Brady DNP          Follow-up Information       Follow up With Specialties Details Why Contact Info    Lee Styles MD Urology Schedule an appointment as soon as possible for a visit  in 7-14 days [address]  [address]  [facility] [address]  [phone]      Raciel Raymond MD Internal Medicine Schedule an appointment as soon as possible for a visit   [address] [facility] [address]  [phone]               Eugenio Brady DNP  07/08/24 2013

## 2024-07-08 NOTE — TELEPHONE ENCOUNTER
Pt was cointacted,unable to lvm. Appt r/s  ----- Message from Roseanna Cat sent at 7/8/2024  1:24 PM CDT -----  Regarding: call back  Type: Patient Call Back    Who called: pt     What is the request in detail: requesting call to discuss appt today; pt is currently in ED     Can the clinic reply by [facility]?no    Would the patient rather a call back or a response via My [facility]? call    Best call back number: [phone]     Additional Information:

## 2024-07-08 NOTE — ED NOTES
IV attempted, catheter tip in tact.  Unable to use left arm - dialysis fistula.  Right arm and hand contracted.

## 2024-07-08 NOTE — ED TRIAGE NOTES
"Pt BIB by  ems from [facility].  Pt complains of lower abdominal "stuffiness" and difficulty with urination.  Pt reports foot surgery about 2 months ago and that home health comes x 4 weekly to provide wound care to bilateral feet.   "

## 2024-07-08 NOTE — DISCHARGE INSTRUCTIONS
Continue wound care as previously directed.  May use miralax over the counter daily to prevent constipation.  Bactrim as ordered.  Return to the Emergency Department for any worsening, change in condition, or any emergent concerns.

## 2024-07-09 ENCOUNTER — PATIENT OUTREACH (OUTPATIENT)
Dept: EMERGENCY MEDICINE | Facility: HOSPITAL | Age: 69
End: 2024-07-09
Payer: MEDICARE

## 2024-07-09 ENCOUNTER — DOCUMENT SCAN (OUTPATIENT)
Dept: HOME HEALTH SERVICES | Facility: HOSPITAL | Age: 69
End: 2024-07-09
Payer: MEDICARE

## 2024-07-09 NOTE — PROGRESS NOTES
Patient was seen in the ED on 7/8/24. Phoned patient to assist with Post ED Discharge Navigation. Patient agreed to assistance scheduling a PCP follow-up. Appointment scheduled. Appointment reminder set.  Lizy Lange

## 2024-07-10 ENCOUNTER — TELEPHONE (OUTPATIENT)
Dept: FAMILY MEDICINE | Facility: CLINIC | Age: 69
End: 2024-07-10
Payer: MEDICARE

## 2024-07-10 ENCOUNTER — PATIENT OUTREACH (OUTPATIENT)
Dept: EMERGENCY MEDICINE | Facility: HOSPITAL | Age: 69
End: 2024-07-10
Payer: MEDICARE

## 2024-07-10 NOTE — TELEPHONE ENCOUNTER
----- Message from Lona Rodriguez MA sent at 7/10/2024  9:18 AM CDT -----  Type:  Sooner Appointment Request    Patient is requesting a  appointment on a Tuesday or Thursday due to dialysis   Name of Caller:    When is the first available appointment?  7/15   Symptoms:  ED f/u   Would the patient rather a call back or a response via My [facility]?    Best Call Back Number:   [phone]  Additional Information:

## 2024-07-10 NOTE — PROGRESS NOTES
Phoned patient to inform about upcoming scheduled appointment on 7/15/24. Patient can not do that date due to M,W,F dialysis. In Basket message sent to PCP staff for scheduling assistance.  Lizy Lange

## 2024-07-10 NOTE — TELEPHONE ENCOUNTER
Spoke with patient to r/s appointment, patient only wanted to see PCP patient denied appointment with anyone else.

## 2024-07-11 DIAGNOSIS — L02.419 LEG ABSCESS: ICD-10-CM

## 2024-07-11 NOTE — TELEPHONE ENCOUNTER
No care due was identified.  [facility] Embedded Care Due Messages. Reference number: [id number].   7/11/2024 2:01:11 PM CDT

## 2024-07-11 NOTE — TELEPHONE ENCOUNTER
----- Message from Zuri Morin sent at 7/11/2024 12:12 PM CDT -----  Who Called:        Refill or New Rx: refill         RX Name and Strength:  oxyCODONE-acetaminophen (PERCOCET)  mg per tablet          Is this a 30 day or 90 day RX        Preferred Pharmacy with phone number: [facility] [address] [place] [address] [address] [facility]            Local or Mail Order: local           Would the patient rather a call back or a response via [facility]? Call back         Best Call Back Number:        Additional Information:

## 2024-07-12 RX ORDER — OXYCODONE AND ACETAMINOPHEN 10; 325 MG/1; MG/1
1 TABLET ORAL EVERY 6 HOURS PRN
Qty: 10 TABLET | Refills: 0 | OUTPATIENT
Start: 2024-07-12

## 2024-07-12 NOTE — TELEPHONE ENCOUNTER
Spoke with patient and dicussed upcoming appointment with the provider medication can't be refilled without in person visit patient

## 2024-07-18 ENCOUNTER — TELEPHONE (OUTPATIENT)
Dept: UROLOGY | Facility: CLINIC | Age: 69
End: 2024-07-18
Payer: MEDICARE

## 2024-07-18 NOTE — TELEPHONE ENCOUNTER
Returned patient call.  Patient states he completed his abx on July 17, 2024, but it feels llike he need more medication.  Patient is scheduled to see provider on Tuesday, July 23, 2024 at 1315.  I reminded patient of appt. Patient said he will wait until then to talk with the provider.   I explained if problem worsen to go the ER for further evaluation.  Patient verbalized understanding.  SAMSON, JIM

## 2024-07-23 ENCOUNTER — OFFICE VISIT (OUTPATIENT)
Dept: UROLOGY | Facility: CLINIC | Age: 69
End: 2024-07-23
Payer: MEDICARE

## 2024-07-23 VITALS — WEIGHT: 169.75 LBS | BODY MASS INDEX: 25.81 KG/M2

## 2024-07-23 DIAGNOSIS — N13.8 BPH WITH OBSTRUCTION/LOWER URINARY TRACT SYMPTOMS: Primary | ICD-10-CM

## 2024-07-23 DIAGNOSIS — R97.20 ELEVATED PSA: ICD-10-CM

## 2024-07-23 DIAGNOSIS — R35.1 NOCTURIA MORE THAN TWICE PER NIGHT: ICD-10-CM

## 2024-07-23 DIAGNOSIS — N40.1 BPH WITH OBSTRUCTION/LOWER URINARY TRACT SYMPTOMS: Primary | ICD-10-CM

## 2024-07-23 PROCEDURE — 99999 PR PBB SHADOW E&M-EST. PATIENT-LVL III: CPT | Mod: PBBFAC,HCNC,, | Performed by: UROLOGY

## 2024-07-23 NOTE — PROGRESS NOTES
Subjective:       Patient ID: Miguel Moore is a 68 y.o. male The patient's last visit with me was on 6/18/2024.     Chief Complaint:   No chief complaint on file.    Hematuria  Patient complains of gross hematuria. Onset of hematuria was a few months ago and was gradual in onset. There is not a history of nephrolithiasis. There is not a history of urologic trauma. Other urologic symptoms include slow stream, hesitancy, intermittency, nocturia. Patient admits to history of no risk factors for cancer. Patient denies history of Agent Orange exposure, chronic Nunez catheter,  surgeries, occupational exposure, sexually transmitted diseases, tobacco use, trauma and urolithiasis. Prior workup has been none.  He had a CT in January when he was hospitalized with renal failure.    He had a Cystoscopy with simple UDS in 2017 by Dr. Chambers.  This showed an enlarged prostate and incomplete bladder emptying.    He is now on MWF dialysis.    9/15/2022  He had a UTI in August 2022.  He had a negative prostate biopsy in February 2021 for a PSA 10.2.  He thinks he has a UTI due to a strong smell and dark color.     5/2/2023  He has noted some straining for the past week.  The odor is off.    8/1/2023  He was given antibiotics last visit.  He is improved after antibiotics.  He has noted the odor is returning.      11/7/2023  He is back with a new PSA.  He tells me his stream is okay.  He is still taking Flomax and Proscar.    2/27/2024  He is back with a MRI.  He has noted an odor to his urine.  He feels he has an odor currently.    6/18/2024  He has noted more difficulty voiding.  He has also noted an odor to his urine.  He is taking Flomax 0.8 mg and Proscar.    07/23/2024  He has noted a pressure to void but then difficulty with the stream.  He continues with MWF dialysis.          ACTIVE MEDICAL ISSUES:  Patient Active Problem List   Diagnosis    Benign essential HTN    Dyslipidemia    BPH (benign prostatic hyperplasia)  2/18/21 prostate bx normal    Seizure disorder as sequela of cerebrovascular accident    Hemiplegia and hemiparesis following cerebral infarction affecting right dominant side    Microcytosis 9/2019 labs c/w AoCD and AoCKD    ESRD (end stage renal disease)    Nuclear sclerosis, left    Cortical cataract of both eyes    DM type 2 without retinopathy    Secondary hyperparathyroidism of renal origin    Vitamin D deficiency    Right sided weakness    Senile nuclear sclerosis    CME (cystoid macular edema), bilateral    Refractive error    History of stroke    Type 2 diabetes mellitus with diabetic neuropathy, with long-term current use of insulin    History of fungal infection candida parasilosis hospitalization 8/2020    Chronic deep vein thrombosis (DVT) of popliteal vein of right lower extremity 9/21/20 outside [place]; unprovoked; anticoagulation    Pulmonary nodule 1/2021 4 mm nodule.    Elevated PSA 2/18/21 prostate bx normal    Anemia in chronic kidney disease    History of diabetic ulcer of foot    Pseudophakia    Bilateral posterior capsular opacification    Paroxysmal atrial fibrillation    Open wound    Peripheral artery disease 5/16/24 LE angio with angioplasty popliteal artery    Abdominal aortic atherosclerosis on CT 4/1/24    Open wound of right foot    Subacute osteomyelitis of right foot    Atherosclerosis of native artery of right lower extremity with ulceration       ALLERGIES AND MEDICATIONS: updated and reviewed.  Review of patient's allergies indicates:   Allergen Reactions    Ace inhibitors      Hyperkalemia 8/2018  Other reaction(s): Unknown    Penicillins Hives     Tolerated cefepime and cefdinir previously    Tizanidine      Felt hot     Current Outpatient Medications   Medication Sig    amLODIPine (NORVASC) 10 MG tablet Take 10 mg by mouth once daily.    apixaban (ELIQUIS) 5 mg Tab Take 1 tablet (5 mg total) by mouth 2 (two) times daily.    aspirin (ECOTRIN) 81 MG EC tablet Take 1 tablet (81 mg  total) by mouth once daily.    atorvastatin (LIPITOR) 80 MG tablet Take 1 tablet (80 mg total) by mouth once daily.    doxycycline (VIBRA-TABS) 100 MG tablet Take 1 tablet (100 mg total) by mouth every 12 (twelve) hours.    ferrous gluconate (FERGON) 324 MG tablet Take 1 tablet (324 mg total) by mouth 2 (two) times daily with meals.    finasteride (PROSCAR) 5 mg tablet Take 1 tablet (5 mg total) by mouth once daily.    labetaloL (NORMODYNE) 100 MG tablet Take 1 tablet (100 mg total) by mouth once daily.    oxyCODONE-acetaminophen (PERCOCET)  mg per tablet Take 1 tablet by mouth every 6 (six) hours as needed for Pain.    RENVELA 800 mg Tab Take 1 tablet (800 mg total) by mouth 3 (three) times daily with meals.    sulfamethoxazole-trimethoprim 400-80mg (BACTRIM,SEPTRA) 400-80 mg per tablet Take 1 tablet by mouth once daily.    tamsulosin (FLOMAX) 0.4 mg Cap Take 2 capsules (0.8 mg total) by mouth once daily.    vitamin renal formula, B-complex-vitamin c-folic acid, (NEPHROCAP) 1 mg Cap Take 1 capsule by mouth once daily.     No current facility-administered medications for this visit.       Review of Systems   Constitutional:  Negative for activity change, fatigue, fever and unexpected weight change.   HENT:  Negative for congestion.    Eyes:  Negative for redness.   Respiratory:  Negative for chest tightness and shortness of breath.    Cardiovascular:  Negative for chest pain and leg swelling.   Gastrointestinal:  Negative for abdominal pain, constipation, diarrhea, nausea and vomiting.   Genitourinary:  Negative for dysuria, flank pain, frequency, hematuria, penile pain, penile swelling, scrotal swelling, testicular pain and urgency.   Musculoskeletal:  Negative for arthralgias and back pain.   Neurological:  Negative for dizziness and light-headedness.   Psychiatric/Behavioral:  Negative for behavioral problems and confusion. The patient is not nervous/anxious.    All other systems reviewed and are  negative.      Objective:      Vitals:    07/23/24 1311   Weight: 77 kg (169 lb 12.1 oz)         Physical Exam  Vitals and nursing note reviewed.   Constitutional:       Appearance: He is well-developed.   HENT:      Head: Normocephalic.   Eyes:      Conjunctiva/sclera: Conjunctivae normal.   Neck:      Thyroid: No thyromegaly.      Trachea: No tracheal deviation.   Cardiovascular:      Rate and Rhythm: Normal rate.      Heart sounds: Normal heart sounds.   Pulmonary:      Effort: Pulmonary effort is normal. No respiratory distress.      Breath sounds: Normal breath sounds. No wheezing.   Abdominal:      General: Bowel sounds are normal.      Palpations: Abdomen is soft.      Tenderness: There is no abdominal tenderness. There is no rebound.      Hernia: No hernia is present.   Musculoskeletal:         General: No tenderness. Normal range of motion.      Cervical back: Normal range of motion and neck supple.   Lymphadenopathy:      Cervical: No cervical adenopathy.   Skin:     General: Skin is warm and dry.      Findings: No erythema or rash.   Neurological:      Mental Status: He is alert and oriented to person, place, and time.   Psychiatric:         Behavior: Behavior normal.         Thought Content: Thought content normal.         Judgment: Judgment normal.         Urine dipstick shows not done.  Micro exam: not done.     Component Ref Range & Units 12 d ago 2 yr ago   PSA Diagnostic 0.00 - 4.00 ng/mL 13.7 High   16.7 High  CM    Comment: The testing method is a chemiluminescent microparticle immunoassay   manufactured by [employer] and performed on the [employer]   or   [employer] system. Values obtained with different assay manufacturers   for   methods may be different and cannot be used interchangeably.   PSA Expected levels:   Hormonal Therapy: <0.05 ng/ml   Prostatectomy: <0.01 ng/ml   Radiation Therapy: <1.00 ng/ml    Resulting Agency  [facility]              Specimen Collected: 10/26/23 14:39 Last  Resulted: 10/26/23 21:13           MRI Pelvis Without Contrast  Order: [id number]  Status: Final result       Visible to patient: No (inaccessible in Patient Portal)       Next appt: Today at 02:30 PM in Urology (Lee Styles MD)       Dx: Elevated PSA    1 Result Note  Details    Reading Physician Reading Date Result Priority   Wander Ontiveros MD  [phone] 1/25/2024 Routine     Narrative & Impression  EXAMINATION:  MRI PELVIS WITHOUT CONTRAST     CLINICAL HISTORY:  Prostate cancer suspected;  Elevated prostate specific antigen (PSA)     Additional history: None provided.     TECHNIQUE:  Multiparametric MRI of the prostate/pelvis performed on a 3T scanner with phase pelvic coil. Multiplanar, multisequence images including high resolution, small field-of-view T2-WI; axial diffusion weighted images with multiple B-values and creation of ADC-maps; and dynamic contrast enhanced T1-weighted images through the prostate were obtained without contrast.     COMPARISON:  CT 01/06/2021     FINDINGS:  Previous biopsy: Negative biopsy 02/18/2021     PSA: 13.7 ng/mL 10/26/2023     Prior therapy: None     Prostate: 5.6 x 5.5 x 7.2 cm corresponding to a computed volume of 116 cc.     Peripheral zone: Diffuse thinning.  No focal abnormalities with imaging features concerning for prostate cancer, score 1.     Transitional zone: Benign prostatic hyperplasia without focal suspicious abnormality, score 2.  Superior protrusion of the prostate at the bladder base.     Neurovascular bundle: Normal appearance.     Seminal vesicles: T1 signal hyperintensity within the right seminal vesicle which could represent hemorrhagic or proteinaceous material.     Adjacent Organ Involvement: No evidence for urinary bladder or rectal invasion.     Lymphadenopathy: None.     Other Findings: Circumferential bladder wall thickening, likely sequela of chronic outlet obstruction.  Trace pelvic free fluid.  Nonspecific diffuse heterogeneous  marrow signal.     Impression:     1. No suspicious prostate lesion.  2. BPH.  3. Signal within the right seminal vesicle may represent hemorrhagic or proteinaceous material.  4. Additional findings as above.  Overall Assessment: PI-RADS 2 - Low (clinically significant cancer is unlikely to be present)     Number of targets created for potential MR/US fusion biopsy     Peripheral zone: 0     Transition zone: 0        Electronically signed by: Wander Ontiveros  Date:                                            01/25/2024  Time:                                           16:51           Exam Ended: 01/25/24 16:30 CST             Assessment:       1. BPH with obstruction/lower urinary tract symptoms    2. Nocturia more than twice per night    3. Elevated PSA                      Plan:       1. BPH with obstruction/lower urinary tract symptoms  Look into outlet procedure  Flomax  Proscar  - Cystoscopy; Future    2. Nocturia more than twice per night  Limit evening fluids    3. Elevated PSA  Check PSA             Follow up in about 4 weeks (around 8/20/2024) for Cystoscopy, Review PSA.

## 2024-07-29 ENCOUNTER — TELEPHONE (OUTPATIENT)
Dept: FAMILY MEDICINE | Facility: CLINIC | Age: 69
End: 2024-07-29
Payer: MEDICARE

## 2024-07-29 DIAGNOSIS — L02.419 LEG ABSCESS: ICD-10-CM

## 2024-07-29 RX ORDER — OXYCODONE AND ACETAMINOPHEN 10; 325 MG/1; MG/1
1 TABLET ORAL EVERY 6 HOURS PRN
Qty: 10 TABLET | Refills: 0 | Status: SHIPPED | OUTPATIENT
Start: 2024-07-29 | End: 2024-07-30 | Stop reason: SDUPTHER

## 2024-07-29 NOTE — TELEPHONE ENCOUNTER
Pt's daughter called in requesting pain medication for pt. States pt went for an [facility]t [facility]. She states pt complained of foot pain and bone and joint informed them to reach otu to PCP.

## 2024-07-29 NOTE — PROGRESS NOTES
This note was created by combination of typed  and M-Modal dictation.  Transcription errors may be present.  If there are any questions, please contact me.    Assessment and Plan:   Assessment and Plan   Addendum  E consult - continue ASA and apixaban    ================================      Type 2 diabetes mellitus with diabetic neuropathy, with long-term current use of insulin  -diet-controlled.  Most recent A1c was normal though confounder is could be artificially low because of dialysis.  Needs to update eye exam, transportation and mobility is an issue currently he is nonweightbearing on his feet.    History of diabetic ulcer of foot  Subacute osteomyelitis of right foot  Open wound of right foot, subsequent encounter  -status post course of antibiotics.  Followed by home health wound care.  Takes Percocet 1 a day on average for the pain and is requesting refill.  I have sent in a 30 day supply.  Not intend to be indefinite  Recent angioplasty.  Has upcoming follow-up with Podiatry as well as vascular  -     oxyCODONE-acetaminophen (PERCOCET)  mg per tablet; Take 1 tablet by mouth every 24 hours as needed for Pain. Medically necessary more than 7 days  Dispense: 30 tablet; Refill: 0    Benign essential HTN  -BP good today.  Not on antihypertensives.  Likely will have variable blood pressures with dialysis.    ESRD (end stage renal disease)  Anemia of chronic kidney failure, stage 5  -takes an oral iron supplement and needs a refill.  That was sent.  -     ferrous gluconate (FERGON) 324 MG tablet; Take 1 tablet (324 mg total) by mouth daily with breakfast.  Dispense: 90 tablet; Refill: 1    History of stroke  Hemiplegia and hemiparesis following cerebral infarction affecting right dominant side  Paroxysmal atrial fibrillation  -history of ischemic stroke and was on aspirin for secondary prevention.  And then subsequent DVT and then AFib so on anticoagulant.  And then recent angioplasty for  peripheral artery disease in the right leg.    Think long-term he probably should be on aspirin as well as DOAC but not sure if he can stop it now with a history of recent angioplasty.  He would be agreeable for eConsult to Cardiology to discuss length of treatment  -     E-Consult to Cardiology    Dyslipidemia  Peripheral artery disease 5/16/24 LE angio with angioplasty popliteal artery  -refilled high-dose atorvastatin.  Anticoagulation/antiplatelet question as above.  He gets labs done through dialysis and I printed a requisition request for lipid profile  -     atorvastatin (LIPITOR) 80 MG tablet; Take 1 tablet (80 mg total) by mouth once daily.  Dispense: 90 tablet; Refill: 3  -     Lipid Panel; Future; Expected date: 07/30/2024  -     E-Consult to Cardiology    Benign prostatic hyperplasia, unspecified whether lower urinary tract symptoms present  -managed by Urology.  On tamsulosin and finasteride    Seizure disorder as sequela of cerebrovascular accident  -he had previously self discontinued seizure medication.  Has not had seizures since.  By his recall had had an episode of seizure at the time of his stroke and none since    Visit today included increased complexity associated with the care of the episodic problem addressed and managing the longitudinal care of the patient due to the serious and/or complex managed problem(s).    Medications Discontinued During This Encounter   Medication Reason    oxyCODONE-acetaminophen (PERCOCET)  mg per tablet Reorder    doxycycline (VIBRA-TABS) 100 MG tablet Therapy completed    sulfamethoxazole-trimethoprim 400-80mg (BACTRIM,SEPTRA) 400-80 mg per tablet     ferrous gluconate (FERGON) 324 MG tablet Reorder    atorvastatin (LIPITOR) 80 MG tablet Reorder    labetaloL (NORMODYNE) 100 MG tablet     amLODIPine (NORVASC) 10 MG tablet Therapy completed       meds sent this encounter:  Medications Ordered This Encounter   Medications    atorvastatin (LIPITOR) 80 MG  tablet     Sig: Take 1 tablet (80 mg total) by mouth once daily.     Dispense:  90 tablet     Refill:  3    ferrous gluconate (FERGON) 324 MG tablet     Sig: Take 1 tablet (324 mg total) by mouth daily with breakfast.     Dispense:  90 tablet     Refill:  1    oxyCODONE-acetaminophen (PERCOCET)  mg per tablet     Sig: Take 1 tablet by mouth every 24 hours as needed for Pain. Medically necessary more than 7 days     Dispense:  30 tablet     Refill:  0     Quantity prescribed more than 7 day supply? No     Order Specific Question:   I have reviewed the Prescription Drug Monitoring Program (PDMP) database for this patient prior to prescribing the above opioid medication     Answer:   Yes         Follow Up:   Future Appointments   Date Time Provider Department Center   8/1/2024  1:30 PM Amelia Sofia NP [facility]   8/13/2024 10:00 AM Sabi Douglas DPM [facility] POD [place]   8/13/2024 10:30 AM LAB, [facility] LAB [facility]   8/20/2024 10:15 AM Lee Styles MD [facility] URO [facility]          Subjective:   Subjective   Chief Complaint   Patient presents with    Follow-up     hospital       HPI  Miguel is a 68 y.o. male.     Social History     Tobacco Use    Smoking status: Never    Smokeless tobacco: Never   Substance Use Topics    Alcohol use: No      Social History     Occupational History    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, etc      Social History     Social History Narrative    Lives with daughter       Last appointment with this clinic was 2/6/2024. Last visit with me 10/26/2023   To summarize last visit and events leading up to today:  Diabetes diet controlled.  A1c not particularly reliable given dialysis status.  Seizures, keppra,  previously had not been taking Keppra regularly.    Saw Neurology 9/2022 No change.  The seizures, continue Keppra.  History of stroke continue Lipitor Eliquis on Norvasc  Hypertension stable not taking hydralazine.     Hyperlipidemia/statin  Ordered colonoscopy  ESRD/HD with secondary hyper PTH.  Followed by Nephrology.    Unprovoked DVT right leg 09/2020.  On DOAC.     Gets Mircera during dialysis every 2 weeks  Calcitriol 3 times a week during dialysis  Markel      Last visit with me  Diabetes stable   History of stroke on statin and Plavix.  Change Plavix to aspirin.  Also on DOAC  Seizure disorder had not been taking his Keppra.  He had a seizure after his stroke and does not sound like he has had 1 since.  He restarted the Keppra after seeing Neurology NP but then subsequently stopped.  Had recommended follow-up with Neurology  Referred to PM&R for motorized wheelchair evaluation  DVT continue DOAC  Hypertension stable at the time on amlodipine, labetalol   We talked about colon cancer screening and he would prefer to do fit kit     11/7/23 urology elevated PSA check pMRI  1/25/24 pMRI  1. No suspicious prostate lesion.  2. BPH.  3. Signal within the right seminal vesicle may represent hemorrhagic or proteinaceous material.  4. Additional findings as above.  Overall Assessment: PI-RADS 2 - Low (clinically significant cancer is unlikely to be present)   Saw urology in follow up 2/27/24. Urine abn odor.  UCx Klebsiella     Hematoma sent to gen surgery     Saw NP 2/6/24 for finger nail bed pain after avulsion.  XR suspicious for osteo     Saw ortho 2/21/24. XR persistent lucency. Sent to hand  Saw hand 2/27/24 ordered MRI, referred to ID  Never got MRI  Never saw ID        Admit 3/20 through 4/19/24 R ankle abscess s/p ortho I+D, e coli and MRSA per ID; AFib/RVR amio and eliquis; transaminitis, hepatomegaly, hold amio and statin; amlodipine stopped; RLE art US with PAD needs vascular already sees [facility] Course:    68 year old man with ESRD on HD, DM and CVA with R sided weakness admitted on 3/21 for fatigue, hyperkalemia, found to have Afib with RVR. Started amio gtt with conversion to NSR. TTE EF 55-60%, mild  LAE. Cardiology consulted. Transitioned on PO amiodarone and continues home eliquis (on this for chronic DVT). He received HD with resolution of hyperkalemia. Transferred out the ICU on 03/26. CT ankle from 3/28 suggestive of cellulitis, no evidence of osteomyelitis, pt had new drainage from R ankle and was found to have an abscess. Orthopedic surgery consulted. Pt S/p I&D 4/4 and 4/6 with OR cx with Ecoli. Operative noted on 4/6 notable for necrotic tissue around distal fibula. Interval improvement in fevers since surgery; however, has leukocytosis that is slowly improving. ID consulted-empiric MRSA coverage added given hx of MRSA. Orthopedic discontinued wound vac on 04/16. PT/OT recommends moderate intensity therapy, but patient declines SNF. Will need home health on discharge. He has elevated liver enzymes - unclear etiology at this time, he is on multiple medications that can cause hepatotoxicity (amio, atorvastatin, abx, etc). Hepatitis panel nonreactive. US abdomen with Hepatosplenomegaly. Blcx so far unrevealing. GI consulted. Amiodarone and statin held. LFTs started improving. Continue to hold statins at the time of dc till f/u with PCP. Wound vac was removed by ortho. Pt dc home with HH, IV abx with HD (vanc and ancef), wound care and OP fu with ID, ortho, pcp.       Hospitalization 5/10 through 5/24/2024 for wound infection.   Hospital Course:   67yo M ESRD, PAD, R foot wounds s/p multiple surgeries admitted w worsening wound. Did have Staph epi in blood culture, likely contaminant. He does not want further debridement, wants BKA if needed. Started vancomycin and meropenem given his history of ESBL organisms. [facility] consulted. Vascular consulted- plan for RLE angiogram on 05/16 . Nephro following for HD. He also has L heel dark area, XR left heel with Osseous structures demonstrate no evidence for acute fracture or osseous destructive lesion.  Mild diffuse demineralization. wound care  consulted.  Patient went for I and D by ortho with cultures taken.  Id continued to follow and cultures negative.  Id recommending discharge with doxycycline for 14 days to complete a course.  Patient had dialysis prior to discharge home with home health to continue wound care with vascular, ID, and orthopedic follow up.  Patient endorsed understanding of plan and all questions answered prior to discharge home      5/16/24 LE arterial US  1. Sonogram suggest hemodynamically significant stenoses in the posterior tibial, peroneal and dorsalis pedis arteries.   5/16/24 L and R angiogram Balloon angioplasty of the popliteal artery with 5x60 Ultraverse balloon    Saw vascular surgery 06/04/2024 in follow-up.  Stable.  Continue with wound care    ED 07/08/2024 for constipation and dysuria.  Unable to obtain urine sample, Urology recommended empiric treatment.  Was given Bactrim renally dosed    Saw Urology in follow-up.  Consider outlet procedure.  Flomax and Proscar and cysto    Upcoming appointments with vascular 8/1, Podiatry 8/13, cysto 9/18    Today's visit:  Presents today on a stretcher, ambulates transport.  Was told he should not be weight-bearing on his foot until fully healed.    Tells me that there are 2 areas, 1 on the side of his foot which is healed in the 1 on the bottom of his foot which is not quite healed over yet.    Wound care comes to his house several times a week.  He is in pain from the ulceration and they treatments.  He has been out of his narcotic for awhile.  I had sent in a refill in June and then again another refill yesterday.  Tells me that he was never notified that the refill was in.    Reviewed his medicines.  Not taking anything for blood pressure.  Notes he has variable blood pressures with dialysis.  History of labetalol and amlodipine not taking either.    Blood pressure today better on repeat.    Requesting refill on his iron.  Takes an iron supplement daily.  Has done so  longstanding.    Taking DOAC as well as aspirin.  Had recent angioplasty.  I am not sure if he needs to continue taking dual therapy.  He would be agreeable for me to place eConsult with Cardiology to see about duration of dual antiplatelet/ anticoagulant therapy    I need to update his lipids.    Recent A1c done while hospitalized was good.  Glucose readings done during hospitalization were reasonable.  Diet-controlled.  Needs to schedule eye doctor    Patient Care Team:  Raciel Raymond MD as PCP - General (Internal Medicine)  Gabriella Manjarrez DPM as Consulting Physician (Podiatry)  Mac Chambers Jr., MD as Consulting Physician (Urology)  Geovany Rucker MD as Consulting Physician (Neurology)  Bob Tay MD as Consulting Physician (Ophthalmology)  Adrian Millard MD as Consulting Physician (Nephrology)  Nikhil Martino MA as Care Coordinator  Lizy Lange as ED Navigator    Patient Active Problem List    Diagnosis Date Noted    Subacute osteomyelitis of right foot 05/13/2024    Atherosclerosis of native artery of right lower extremity with ulceration 05/13/2024    Open wound of right foot 05/10/2024    Peripheral artery disease 5/16/24 LE angio with angioplasty popliteal artery 04/25/2024     3/20/24 RLE arterial US Arterial vasculature of the right lower extremity is patent.  However, there is evidence of atherosclerotic change with stenosis at the level of popliteal artery.   5/16/24 LE arterial US  Sonogram suggest hemodynamically significant stenoses in the posterior tibial, peroneal and dorsalis pedis arteries.   5/16/24 L and R angiogram Balloon angioplasty of the popliteal artery with 5x60 Ultraverse balloon      Abdominal aortic atherosclerosis on CT 4/1/24 04/25/2024    Open wound 03/26/2024    Paroxysmal atrial fibrillation 03/20/2024     3/20/24 TTE LV normal systolic function LVEF 55-60%.  Converted to sinus rhythm on amiodarone   Amiodarone stopped on hospital  discharge due to possible contributor to transaminitis      Bilateral posterior capsular opacification 05/02/2023    Pseudophakia 01/06/2022    History of diabetic ulcer of foot     Elevated PSA 2/18/21 prostate bx normal 02/18/2021 2/18/21 prostate bx normal  1/25/24 MRI prostate PIRADS 2      Pulmonary nodule 1/2021 4 mm nodule. 01/06/2021 1/6/2021 CT abd/pelvis: 4 mm nodule in the right middle lobe      Chronic deep vein thrombosis (DVT) of popliteal vein of right lower extremity 9/21/20 outside US; unprovoked; anticoagulation 09/21/2020    History of fungal infection candida parasilosis hospitalization 8/2020 08/29/2020     -Found to have candidemia - source unclear  -infectious disease consulted, Started on micafungin and now converted to fluconazole  -Repeat cultures negative so far  -Dialysis line removed 8/28.   line replaced on 08/31 after line holiday.  -end date of fluconazole will be 09/11/2020.  Patient has ophthalmology follow-up scheduled on 09/08/2020. Tentative end date 9/11/2020 If no endophthalmitis. If noted to have endophthalmitis during optho visit, would need at least 4-6 weeks of treatment      Anemia in chronic kidney disease 08/26/2020    Type 2 diabetes mellitus with diabetic neuropathy, with long-term current use of insulin 05/10/2018    History of stroke 12/04/2017    CME (cystoid macular edema), bilateral 11/16/2017    Refractive error 11/16/2017    Senile nuclear sclerosis 10/05/2017    Right sided weakness 09/11/2017    Secondary hyperparathyroidism of renal origin 08/03/2017    Vitamin D deficiency 08/03/2017    Nuclear sclerosis, left 06/09/2017    Cortical cataract of both eyes 06/09/2017    DM type 2 without retinopathy 06/09/2017    Microcytosis 9/2019 labs c/w AoCD and AoCKD 03/22/2017     Seen on admission as well 2015; normal Hb, low MCV.  Normal RDW  08/17/2020 EGD normal esophagus.  Discolored nodular and texture change mucosa in the antrum.  Normal duodenum.  Path  with foveolar hyperplasia and early xanthoma formation.  H pylori negative.  Mild chronic inflammation without acute activity      ESRD (end stage renal disease) 03/22/2017 2/27/20 renal US with dopplers no ALF.  Medical renal disease  8/2020 renal US: 1. Symmetric diffusely increased cortical echogenicity and elevated resistive indices, nonspecific indicators of chronic medical renal disease.  2. Prostatomegaly.  Some of the provided images are suggestive of a distinct hyperechoic component of the prostate gland, of uncertain etiology or significance, and potentially artifactual.  Dedicated prostate ultrasound or MRI may be of benefit for further characterization.    Started on HD while hospitalized for progression to ESRD 8/2020  -Continue dialysis per nephrology  -Outpatient HD has been arranged  -Had planned discharge 8/27 if remained afebrile and cultures negative.  Unfortunately his blood culture grew Candida parapsilosis  -Dr. Gomez with ID consulted and case discussed with him.  Started micafungin 8/27 and now on fluconazole.  -Dialysis line removed after HD 8/28 and plan line holiday over weekend.  -new line by IR on 8/31, tolerated dialysis fluid.  -continue outpatient dialysis.      Benign essential HTN 03/21/2017 01/06/2021 TTE mild left atrial enlargement.  LV normal size and systolic function LVEF 55%.  Indeterminate diastolic function.  Mild right atrial enlargement.  Normal RV systolic function.  Normal RV size.  PA pressure 20.  Normal CVP.  Three.      Dyslipidemia 03/21/2017    BPH (benign prostatic hyperplasia) 2/18/21 prostate bx normal 03/21/2017 6/26/2017 renal US mod prostatomegaly.      Seizure disorder as sequela of cerebrovascular accident 03/21/2017    Hemiplegia and hemiparesis following cerebral infarction affecting right dominant side 03/21/2017       PAST MEDICAL PROBLEMS, PAST SURGICAL HISTORY: please see relevant portions of the electronic medical record    ALLERGIES  AND MEDICATIONS: updated and reviewed.  Medication List with Changes/Refills   Current Medications    AMLODIPINE (NORVASC) 10 MG TABLET    Take 10 mg by mouth once daily.    APIXABAN (ELIQUIS) 5 MG TAB    Take 1 tablet (5 mg total) by mouth 2 (two) times daily.    ASPIRIN (ECOTRIN) 81 MG EC TABLET    Take 1 tablet (81 mg total) by mouth once daily.    ATORVASTATIN (LIPITOR) 80 MG TABLET    Take 1 tablet (80 mg total) by mouth once daily.    DOXYCYCLINE (VIBRA-TABS) 100 MG TABLET    Take 1 tablet (100 mg total) by mouth every 12 (twelve) hours.    FERROUS GLUCONATE (FERGON) 324 MG TABLET    Take 1 tablet (324 mg total) by mouth 2 (two) times daily with meals.    FINASTERIDE (PROSCAR) 5 MG TABLET    Take 1 tablet (5 mg total) by mouth once daily.    LABETALOL (NORMODYNE) 100 MG TABLET    Take 1 tablet (100 mg total) by mouth once daily.    OXYCODONE-ACETAMINOPHEN (PERCOCET)  MG PER TABLET    Take 1 tablet by mouth every 6 (six) hours as needed for Pain. Keep appointment 7/30/2024    RENVELA 800 MG TAB    Take 1 tablet (800 mg total) by mouth 3 (three) times daily with meals.    SULFAMETHOXAZOLE-TRIMETHOPRIM 400-80MG (BACTRIM,SEPTRA) 400-80 MG PER TABLET    Take 1 tablet by mouth once daily.    TAMSULOSIN (FLOMAX) 0.4 MG CAP    Take 2 capsules (0.8 mg total) by mouth once daily.    VITAMIN RENAL FORMULA, B-COMPLEX-VITAMIN C-FOLIC ACID, (NEPHROCAP) 1 MG CAP    Take 1 capsule by mouth once daily.         Objective:   Objective   Physical Exam   Vitals:    07/30/24 1447 07/30/24 1511   BP: (!) 148/62 130/68   BP Location:  Right arm   Patient Position:  Sitting   BP Method:  Medium (Manual)   Pulse: 80    Temp: 99.4 °F (37.4 °C)    SpO2: 98%    Weight: 77.1 kg (170 lb 0.3 oz)     Body mass index is 25.85 kg/m².            Physical Exam  Constitutional:       General: He is not in acute distress.     Appearance: He is well-developed.   Eyes:      Extraocular Movements: Extraocular movements intact.   Cardiovascular:       Rate and Rhythm: Normal rate and regular rhythm.      Heart sounds: Normal heart sounds. No murmur heard.  Pulmonary:      Effort: Pulmonary effort is normal.      Breath sounds: Normal breath sounds.   Musculoskeletal:         General: Deformity present.      Comments: Dense right-sided hemiplegia baseline   Skin:     General: Skin is warm and dry.      Comments: Both of his feet are heavily bandaged and I did not remove them   Neurological:      Mental Status: He is alert and oriented to person, place, and time.      Coordination: Coordination abnormal.   Psychiatric:         Behavior: Behavior normal.         Thought Content: Thought content normal.

## 2024-07-29 NOTE — TELEPHONE ENCOUNTER
I will send in refill on the percocet.  He has appointment with me tomorrow, needs to keep that appointment.    Future Appointments   Date Time Provider Department Center   7/30/2024  3:00 PM Raciel Raymond MD [facility] MED [place]   8/1/2024  1:30 PM Amelia Sofia, NP [facility] VAS [facility]   8/13/2024 10:00 AM Sabi Douglas DPM [facility] POD [place]   8/13/2024 10:30 AM LAB, [facility] LAB [facility]   8/20/2024 10:15 AM Lee Styles MD [facility]

## 2024-07-30 ENCOUNTER — OFFICE VISIT (OUTPATIENT)
Dept: FAMILY MEDICINE | Facility: CLINIC | Age: 69
End: 2024-07-30
Payer: MEDICARE

## 2024-07-30 VITALS
BODY MASS INDEX: 25.85 KG/M2 | WEIGHT: 170 LBS | TEMPERATURE: 99 F | HEART RATE: 80 BPM | OXYGEN SATURATION: 98 % | DIASTOLIC BLOOD PRESSURE: 68 MMHG | SYSTOLIC BLOOD PRESSURE: 130 MMHG

## 2024-07-30 DIAGNOSIS — I69.398 SEIZURE DISORDER AS SEQUELA OF CEREBROVASCULAR ACCIDENT: Chronic | ICD-10-CM

## 2024-07-30 DIAGNOSIS — D63.1 ANEMIA OF CHRONIC KIDNEY FAILURE, STAGE 5: ICD-10-CM

## 2024-07-30 DIAGNOSIS — I69.351 HEMIPLEGIA AND HEMIPARESIS FOLLOWING CEREBRAL INFARCTION AFFECTING RIGHT DOMINANT SIDE: Chronic | ICD-10-CM

## 2024-07-30 DIAGNOSIS — E11.40 TYPE 2 DIABETES MELLITUS WITH DIABETIC NEUROPATHY, WITH LONG-TERM CURRENT USE OF INSULIN: Primary | Chronic | ICD-10-CM

## 2024-07-30 DIAGNOSIS — N40.0 BENIGN PROSTATIC HYPERPLASIA, UNSPECIFIED WHETHER LOWER URINARY TRACT SYMPTOMS PRESENT: Chronic | ICD-10-CM

## 2024-07-30 DIAGNOSIS — M86.271 SUBACUTE OSTEOMYELITIS OF RIGHT FOOT: ICD-10-CM

## 2024-07-30 DIAGNOSIS — I48.0 PAROXYSMAL ATRIAL FIBRILLATION: Chronic | ICD-10-CM

## 2024-07-30 DIAGNOSIS — N18.5 ANEMIA OF CHRONIC KIDNEY FAILURE, STAGE 5: ICD-10-CM

## 2024-07-30 DIAGNOSIS — I73.9 PERIPHERAL ARTERY DISEASE: ICD-10-CM

## 2024-07-30 DIAGNOSIS — Z86.31 HISTORY OF DIABETIC ULCER OF FOOT: ICD-10-CM

## 2024-07-30 DIAGNOSIS — N18.6 ESRD (END STAGE RENAL DISEASE): Chronic | ICD-10-CM

## 2024-07-30 DIAGNOSIS — G40.909 SEIZURE DISORDER AS SEQUELA OF CEREBROVASCULAR ACCIDENT: Chronic | ICD-10-CM

## 2024-07-30 DIAGNOSIS — Z86.73 HISTORY OF STROKE: ICD-10-CM

## 2024-07-30 DIAGNOSIS — E78.5 DYSLIPIDEMIA: Chronic | ICD-10-CM

## 2024-07-30 DIAGNOSIS — I10 BENIGN ESSENTIAL HTN: Chronic | ICD-10-CM

## 2024-07-30 DIAGNOSIS — S91.301D OPEN WOUND OF RIGHT FOOT, SUBSEQUENT ENCOUNTER: ICD-10-CM

## 2024-07-30 DIAGNOSIS — Z79.4 TYPE 2 DIABETES MELLITUS WITH DIABETIC NEUROPATHY, WITH LONG-TERM CURRENT USE OF INSULIN: Primary | Chronic | ICD-10-CM

## 2024-07-30 PROCEDURE — 99999 PR PBB SHADOW E&M-EST. PATIENT-LVL III: CPT | Mod: PBBFAC,HCNC,, | Performed by: INTERNAL MEDICINE

## 2024-07-30 RX ORDER — ATORVASTATIN CALCIUM 80 MG/1
80 TABLET, FILM COATED ORAL DAILY
Qty: 90 TABLET | Refills: 3 | Status: SHIPPED | OUTPATIENT
Start: 2024-07-30 | End: 2025-07-30

## 2024-07-30 RX ORDER — FERROUS GLUCONATE 324(38)MG
324 TABLET ORAL
Qty: 90 TABLET | Refills: 1 | Status: SHIPPED | OUTPATIENT
Start: 2024-07-30

## 2024-07-30 RX ORDER — OXYCODONE AND ACETAMINOPHEN 10; 325 MG/1; MG/1
1 TABLET ORAL
Qty: 30 TABLET | Refills: 0 | Status: SHIPPED | OUTPATIENT
Start: 2024-07-30

## 2024-07-31 ENCOUNTER — E-CONSULT (OUTPATIENT)
Dept: CARDIOLOGY | Facility: CLINIC | Age: 69
End: 2024-07-31
Payer: MEDICARE

## 2024-07-31 ENCOUNTER — TELEPHONE (OUTPATIENT)
Dept: FAMILY MEDICINE | Facility: CLINIC | Age: 69
End: 2024-07-31
Payer: MEDICARE

## 2024-07-31 DIAGNOSIS — E11.40 TYPE 2 DIABETES MELLITUS WITH DIABETIC NEUROPATHY, WITH LONG-TERM CURRENT USE OF INSULIN: Chronic | ICD-10-CM

## 2024-07-31 DIAGNOSIS — Z86.73 HISTORY OF STROKE: Primary | ICD-10-CM

## 2024-07-31 DIAGNOSIS — I70.0 ABDOMINAL AORTIC ATHEROSCLEROSIS: ICD-10-CM

## 2024-07-31 DIAGNOSIS — E78.5 DYSLIPIDEMIA: Chronic | ICD-10-CM

## 2024-07-31 DIAGNOSIS — I48.0 PAROXYSMAL ATRIAL FIBRILLATION: Chronic | ICD-10-CM

## 2024-07-31 DIAGNOSIS — N18.6 ESRD (END STAGE RENAL DISEASE): Chronic | ICD-10-CM

## 2024-07-31 DIAGNOSIS — Z79.4 TYPE 2 DIABETES MELLITUS WITH DIABETIC NEUROPATHY, WITH LONG-TERM CURRENT USE OF INSULIN: Chronic | ICD-10-CM

## 2024-07-31 DIAGNOSIS — I73.9 PERIPHERAL ARTERY DISEASE: ICD-10-CM

## 2024-07-31 DIAGNOSIS — I10 BENIGN ESSENTIAL HTN: Chronic | ICD-10-CM

## 2024-07-31 NOTE — CONSULTS
[facility] - Cardiology  Response for E-Consult     Patient Name: Miguel Moore  MRN: [id number]  Primary Care Provider: Raciel Raymond MD   Requesting Provider: Raciel Raymond MD  E-Consult to Cardiology  Consult performed by: Marcos Willingham MD  Consult ordered by: Raciel Raymond MD  Reason for consult: Anticoagulation recommendations  Assessment/Recommendations: Recommend continuation of aspirin and apixaban.  Postprocedure patient could have been discharged on aspirin clopidogrel and apixaban.  However no need for clopidogrel at this time.          Recommendation:  Continue aspirin and apixaban.    Miguel Moore is a 68-year-old male with a history of atrial fibrillation.  On apixaban.  Hypertension.  Hyperlipidemia.  Diabetes.  On dialysis.  Chronic DVT.  Stroke.  History of diabetic foot ulcer.  Subacute osteomyelitis of right foot.  Report of recent angioplasty of right popliteal artery with a 5 x 60 Ultraverse balloon.  He was discharged home on aspirin and apixaban in May of this year.  We are asked to answer question if patient should be on both aspirin and apixaban or apixaban alone.    Echocardiogram 01/06/2021:    The left ventricle is normal in size with normal systolic function. The estimated ejection fraction is 55%  Indeterminate left ventricular diastolic function.  Mild right atrial enlargement.  Normal right ventricular size with normal right ventricular systolic function.  The estimated PA systolic pressure is 20 mmHg.  Normal central venous pressure (3 mmHg).  Trivial anterolateral pericardial effusion.    Contingency that warrants a repeat eConsult or referral: Yes    Total time of Consultation: 25 minute    I did speak to the requesting provider verbally about this.     *This eConsult is based on the clinical data available to me and is furnished without benefit of a physical examination. The eConsult will need to be interpreted in light of any clinical issues or changes in patient  status not available to me at the time of filing this eConsults. Significant changes in patient condition or level of acuity should result in immediate formal consultation and reevaluation. Please alert me if you have further questions.    Thank you for this eConsult referral.     Marcos Willingham MD  [facility] - Cardiology

## 2024-07-31 NOTE — TELEPHONE ENCOUNTER
Please call pt - I discussed with cardiology - keep taking aspirin and keep taking eliquis - both blood thinners.

## 2024-08-01 ENCOUNTER — TELEPHONE (OUTPATIENT)
Dept: VASCULAR SURGERY | Facility: CLINIC | Age: 69
End: 2024-08-01
Payer: MEDICARE

## 2024-08-07 ENCOUNTER — TELEPHONE (OUTPATIENT)
Dept: VASCULAR SURGERY | Facility: CLINIC | Age: 69
End: 2024-08-07
Payer: MEDICARE

## 2024-08-08 ENCOUNTER — TELEPHONE (OUTPATIENT)
Dept: VASCULAR SURGERY | Facility: CLINIC | Age: 69
End: 2024-08-08
Payer: MEDICARE

## 2024-08-13 ENCOUNTER — TELEPHONE (OUTPATIENT)
Dept: FAMILY MEDICINE | Facility: CLINIC | Age: 69
End: 2024-08-13
Payer: MEDICARE

## 2024-08-13 ENCOUNTER — PATIENT OUTREACH (OUTPATIENT)
Dept: ADMINISTRATIVE | Facility: HOSPITAL | Age: 69
End: 2024-08-13
Payer: MEDICARE

## 2024-08-13 NOTE — TELEPHONE ENCOUNTER
----- Message from Vidya Jhoan sent at 8/12/2024 10:00 AM CDT -----  Type: Patient Call Back    Who called: self     What is the request in detail: patient is requesting to have staff to call [facility] to let them that patient needs to be transported on a stretcher, [facility] told patient that he will have to be in the wheelchair, Patient stated that's hard from him because he has legs are weak because he has not walked in 3 months. Please call    Can the clinic reply by [facility]? no    Would the patient rather a call back or a response via [facility]?  call    Best call back number: .[phone] (home)      Additional Information:

## 2024-08-15 ENCOUNTER — TELEPHONE (OUTPATIENT)
Dept: FAMILY MEDICINE | Facility: CLINIC | Age: 69
End: 2024-08-15
Payer: MEDICARE

## 2024-08-15 NOTE — TELEPHONE ENCOUNTER
----- Message from Miguel Robertson sent at 8/15/2024  9:59 AM CDT -----  Regarding: Miguel  Type:Patient Callback     Who called: Miguel     What is the request in detail: Pt stated that the would like to know what current diabetis medications he is on. This is for insurance purposes and insurance is trying to help him get a secondary to help with getting his medications covered. Please reach to the patient as soon as possible with this information.     Can the clinic reply by [facility]? No     Would the patient rather a call back or a response via [facility]? Yes     Best call back number: [phone]      Additional Information:

## 2024-08-15 NOTE — TELEPHONE ENCOUNTER
Spoke with patient about medication that he is not on any diabetes medication. Patient stated he might be confused.

## 2024-08-16 ENCOUNTER — TELEPHONE (OUTPATIENT)
Dept: FAMILY MEDICINE | Facility: CLINIC | Age: 69
End: 2024-08-16
Payer: MEDICARE

## 2024-08-16 DIAGNOSIS — M79.671 RIGHT FOOT PAIN: Primary | ICD-10-CM

## 2024-08-16 NOTE — TELEPHONE ENCOUNTER
Spoke with patient. Patient verbalized understanding. Patient has no other questions or concerns at this time.

## 2024-08-16 NOTE — TELEPHONE ENCOUNTER
----- Message from Heydi Adams sent at 8/16/2024  1:32 PM CDT -----  Regarding: Referral Request  Type:  Patient Requesting Referral    Who Called: Self     Referral to What Specialty: physical therapy with [facility]     Reason for Referral: right foot     Does the patient want the referral with a specific physician?: no     Is the specialist [facility]?: [facility]     Would the patient rather a call back or a response via [facility]? Call     Best Call Back Number: [phone]      Additional Information:

## 2024-08-20 ENCOUNTER — TELEPHONE (OUTPATIENT)
Dept: VASCULAR SURGERY | Facility: CLINIC | Age: 69
End: 2024-08-20
Payer: MEDICARE

## 2024-08-21 ENCOUNTER — ANESTHESIA (OUTPATIENT)
Dept: ENDOSCOPY | Facility: HOSPITAL | Age: 69
End: 2024-08-21
Payer: MEDICARE

## 2024-08-21 ENCOUNTER — ANESTHESIA EVENT (OUTPATIENT)
Dept: ENDOSCOPY | Facility: HOSPITAL | Age: 69
End: 2024-08-21
Payer: MEDICARE

## 2024-08-21 ENCOUNTER — HOSPITAL ENCOUNTER (INPATIENT)
Facility: HOSPITAL | Age: 69
LOS: 10 days | Discharge: HOME | DRG: 356 | End: 2024-08-31
Attending: EMERGENCY MEDICINE | Admitting: HOSPITALIST
Payer: MEDICARE

## 2024-08-21 DIAGNOSIS — K92.2 ACUTE UPPER GI BLEEDING: ICD-10-CM

## 2024-08-21 DIAGNOSIS — K92.0 GASTROINTESTINAL HEMORRHAGE WITH HEMATEMESIS: ICD-10-CM

## 2024-08-21 DIAGNOSIS — K92.0 HEMATEMESIS, UNSPECIFIED WHETHER NAUSEA PRESENT: Primary | ICD-10-CM

## 2024-08-21 DIAGNOSIS — Z99.2 END-STAGE RENAL DISEASE ON HEMODIALYSIS: ICD-10-CM

## 2024-08-21 DIAGNOSIS — N18.6 ESRD (END STAGE RENAL DISEASE): Chronic | ICD-10-CM

## 2024-08-21 DIAGNOSIS — D63.1 ANEMIA IN CHRONIC KIDNEY DISEASE, ON CHRONIC DIALYSIS: Chronic | ICD-10-CM

## 2024-08-21 DIAGNOSIS — N18.6 ANEMIA IN CHRONIC KIDNEY DISEASE, ON CHRONIC DIALYSIS: Chronic | ICD-10-CM

## 2024-08-21 DIAGNOSIS — K92.2 GI BLEED: ICD-10-CM

## 2024-08-21 DIAGNOSIS — Z99.2 ANEMIA IN CHRONIC KIDNEY DISEASE, ON CHRONIC DIALYSIS: Chronic | ICD-10-CM

## 2024-08-21 DIAGNOSIS — N18.6 END-STAGE RENAL DISEASE ON HEMODIALYSIS: ICD-10-CM

## 2024-08-21 DIAGNOSIS — K92.1 HEMATOCHEZIA: ICD-10-CM

## 2024-08-21 DIAGNOSIS — K25.4 GASTRIC ULCER WITH HEMORRHAGE, UNSPECIFIED CHRONICITY: ICD-10-CM

## 2024-08-21 DIAGNOSIS — D62 ACUTE BLOOD LOSS ANEMIA (ABLA): ICD-10-CM

## 2024-08-21 LAB
ABO + RH BLD: NORMAL
ALBUMIN SERPL BCP-MCNC: 2.3 G/DL (ref 3.5–5.2)
ALP SERPL-CCNC: 38 U/L (ref 55–135)
ALT SERPL W/O P-5'-P-CCNC: <5 U/L (ref 10–44)
ANION GAP SERPL CALC-SCNC: 16 MMOL/L (ref 8–16)
AST SERPL-CCNC: 5 U/L (ref 10–40)
BASOPHILS # BLD AUTO: 0.02 K/UL (ref 0–0.2)
BASOPHILS NFR BLD: 0.3 % (ref 0–1.9)
BILIRUB SERPL-MCNC: 0.6 MG/DL (ref 0.1–1)
BLD GP AB SCN CELLS X3 SERPL QL: NORMAL
BLD PROD TYP BPU: NORMAL
BLD PROD TYP BPU: NORMAL
BLOOD UNIT EXPIRATION DATE: NORMAL
BLOOD UNIT EXPIRATION DATE: NORMAL
BLOOD UNIT TYPE CODE: 6200
BLOOD UNIT TYPE CODE: 6200
BLOOD UNIT TYPE: NORMAL
BLOOD UNIT TYPE: NORMAL
BUN SERPL-MCNC: 86 MG/DL (ref 8–23)
CALCIUM SERPL-MCNC: 8.2 MG/DL (ref 8.7–10.5)
CHLORIDE SERPL-SCNC: 101 MMOL/L (ref 95–110)
CO2 SERPL-SCNC: 25 MMOL/L (ref 23–29)
CODING SYSTEM: NORMAL
CODING SYSTEM: NORMAL
CREAT SERPL-MCNC: 8.2 MG/DL (ref 0.5–1.4)
CROSSMATCH INTERPRETATION: NORMAL
CROSSMATCH INTERPRETATION: NORMAL
DIFFERENTIAL METHOD BLD: ABNORMAL
DISPENSE STATUS: NORMAL
DISPENSE STATUS: NORMAL
EOSINOPHIL # BLD AUTO: 0 K/UL (ref 0–0.5)
EOSINOPHIL NFR BLD: 0.1 % (ref 0–8)
EOSINOPHIL NFR BLD: 0.3 % (ref 0–8)
EOSINOPHIL NFR BLD: 0.3 % (ref 0–8)
ERYTHROCYTE [DISTWIDTH] IN BLOOD BY AUTOMATED COUNT: 17.7 % (ref 11.5–14.5)
ERYTHROCYTE [DISTWIDTH] IN BLOOD BY AUTOMATED COUNT: 18 % (ref 11.5–14.5)
ERYTHROCYTE [DISTWIDTH] IN BLOOD BY AUTOMATED COUNT: 18.1 % (ref 11.5–14.5)
EST. GFR  (NO RACE VARIABLE): 6 ML/MIN/1.73 M^2
GLUCOSE SERPL-MCNC: 153 MG/DL (ref 70–110)
HCT VFR BLD AUTO: 14.9 % (ref 40–54)
HCT VFR BLD AUTO: 18.2 % (ref 40–54)
HCT VFR BLD AUTO: 24.4 % (ref 40–54)
HGB BLD-MCNC: 5 G/DL (ref 14–18)
HGB BLD-MCNC: 5.9 G/DL (ref 14–18)
HGB BLD-MCNC: 8.6 G/DL (ref 14–18)
IMM GRANULOCYTES # BLD AUTO: 0.02 K/UL (ref 0–0.04)
IMM GRANULOCYTES # BLD AUTO: 0.03 K/UL (ref 0–0.04)
IMM GRANULOCYTES # BLD AUTO: 0.04 K/UL (ref 0–0.04)
IMM GRANULOCYTES NFR BLD AUTO: 0.3 % (ref 0–0.5)
IMM GRANULOCYTES NFR BLD AUTO: 0.4 % (ref 0–0.5)
IMM GRANULOCYTES NFR BLD AUTO: 0.5 % (ref 0–0.5)
INR PPP: 1.1 (ref 0.8–1.2)
LYMPHOCYTES # BLD AUTO: 1 K/UL (ref 1–4.8)
LYMPHOCYTES # BLD AUTO: 1.1 K/UL (ref 1–4.8)
LYMPHOCYTES # BLD AUTO: 1.5 K/UL (ref 1–4.8)
LYMPHOCYTES NFR BLD: 13.1 % (ref 18–48)
LYMPHOCYTES NFR BLD: 15.3 % (ref 18–48)
LYMPHOCYTES NFR BLD: 20 % (ref 18–48)
MAGNESIUM SERPL-MCNC: 2 MG/DL (ref 1.6–2.6)
MCH RBC QN AUTO: 25.4 PG (ref 27–31)
MCH RBC QN AUTO: 26.2 PG (ref 27–31)
MCH RBC QN AUTO: 28.4 PG (ref 27–31)
MCHC RBC AUTO-ENTMCNC: 32.4 G/DL (ref 32–36)
MCHC RBC AUTO-ENTMCNC: 33.6 G/DL (ref 32–36)
MCHC RBC AUTO-ENTMCNC: 35.2 G/DL (ref 32–36)
MCV RBC AUTO: 78 FL (ref 82–98)
MCV RBC AUTO: 78 FL (ref 82–98)
MCV RBC AUTO: 81 FL (ref 82–98)
MONOCYTES # BLD AUTO: 0.4 K/UL (ref 0.3–1)
MONOCYTES # BLD AUTO: 0.4 K/UL (ref 0.3–1)
MONOCYTES # BLD AUTO: 0.5 K/UL (ref 0.3–1)
MONOCYTES NFR BLD: 5.1 % (ref 4–15)
MONOCYTES NFR BLD: 5.3 % (ref 4–15)
MONOCYTES NFR BLD: 6.4 % (ref 4–15)
NEUTROPHILS # BLD AUTO: 5.4 K/UL (ref 1.8–7.7)
NEUTROPHILS # BLD AUTO: 5.7 K/UL (ref 1.8–7.7)
NEUTROPHILS # BLD AUTO: 6.1 K/UL (ref 1.8–7.7)
NEUTROPHILS NFR BLD: 72.5 % (ref 38–73)
NEUTROPHILS NFR BLD: 78.8 % (ref 38–73)
NEUTROPHILS NFR BLD: 80.7 % (ref 38–73)
NRBC BLD-RTO: 0 /100 WBC
NUM UNITS TRANS PACKED RBC: NORMAL
NUM UNITS TRANS PACKED RBC: NORMAL
OHS QRS DURATION: 76 MS
OHS QTC CALCULATION: 468 MS
PHOSPHATE SERPL-MCNC: 2.6 MG/DL (ref 2.7–4.5)
PLATELET # BLD AUTO: 188 K/UL (ref 150–450)
PLATELET # BLD AUTO: 204 K/UL (ref 150–450)
PLATELET # BLD AUTO: 215 K/UL (ref 150–450)
PMV BLD AUTO: 9.2 FL (ref 9.2–12.9)
PMV BLD AUTO: 9.5 FL (ref 9.2–12.9)
PMV BLD AUTO: 9.6 FL (ref 9.2–12.9)
POTASSIUM SERPL-SCNC: 4.1 MMOL/L (ref 3.5–5.1)
PROT SERPL-MCNC: 5.6 G/DL (ref 6–8.4)
PROTHROMBIN TIME: 12.2 SEC (ref 9–12.5)
RBC # BLD AUTO: 1.91 M/UL (ref 4.6–6.2)
RBC # BLD AUTO: 2.32 M/UL (ref 4.6–6.2)
RBC # BLD AUTO: 3.03 M/UL (ref 4.6–6.2)
SODIUM SERPL-SCNC: 142 MMOL/L (ref 136–145)
WBC # BLD AUTO: 7.24 K/UL (ref 3.9–12.7)
WBC # BLD AUTO: 7.5 K/UL (ref 3.9–12.7)
WBC # BLD AUTO: 7.54 K/UL (ref 3.9–12.7)

## 2024-08-21 PROCEDURE — 86900 BLOOD TYPING SEROLOGIC ABO: CPT | Performed by: EMERGENCY MEDICINE

## 2024-08-21 PROCEDURE — 0W3P8ZZ CONTROL BLEEDING IN GASTROINTESTINAL TRACT, VIA NATURAL OR ARTIFICIAL OPENING ENDOSCOPIC: ICD-10-PCS | Performed by: INTERNAL MEDICINE

## 2024-08-21 PROCEDURE — 63600175 PHARM REV CODE 636 W HCPCS: Performed by: EMERGENCY MEDICINE

## 2024-08-21 PROCEDURE — D9220A PRA ANESTHESIA: Mod: ANES,,, | Performed by: ANESTHESIOLOGY

## 2024-08-21 PROCEDURE — 84100 ASSAY OF PHOSPHORUS: CPT | Performed by: EMERGENCY MEDICINE

## 2024-08-21 PROCEDURE — 63600175 PHARM REV CODE 636 W HCPCS: Performed by: HOSPITALIST

## 2024-08-21 PROCEDURE — 86920 COMPATIBILITY TEST SPIN: CPT | Performed by: EMERGENCY MEDICINE

## 2024-08-21 PROCEDURE — 93005 ELECTROCARDIOGRAM TRACING: CPT

## 2024-08-21 PROCEDURE — 25000003 PHARM REV CODE 250

## 2024-08-21 PROCEDURE — 96374 THER/PROPH/DIAG INJ IV PUSH: CPT

## 2024-08-21 PROCEDURE — 63600175 PHARM REV CODE 636 W HCPCS

## 2024-08-21 PROCEDURE — 25000003 PHARM REV CODE 250: Performed by: HOSPITALIST

## 2024-08-21 PROCEDURE — 43255 EGD CONTROL BLEEDING ANY: CPT | Performed by: INTERNAL MEDICINE

## 2024-08-21 PROCEDURE — 27200997: Performed by: INTERNAL MEDICINE

## 2024-08-21 PROCEDURE — 36430 TRANSFUSION BLD/BLD COMPNT: CPT

## 2024-08-21 PROCEDURE — 27280297: Performed by: INTERNAL MEDICINE

## 2024-08-21 PROCEDURE — 43255 EGD CONTROL BLEEDING ANY: CPT | Mod: ,,, | Performed by: INTERNAL MEDICINE

## 2024-08-21 PROCEDURE — 30233N1 TRANSFUSION OF NONAUTOLOGOUS RED BLOOD CELLS INTO PERIPHERAL VEIN, PERCUTANEOUS APPROACH: ICD-10-PCS | Performed by: INTERNAL MEDICINE

## 2024-08-21 PROCEDURE — 99285 EMERGENCY DEPT VISIT HI MDM: CPT | Mod: 25

## 2024-08-21 PROCEDURE — 80053 COMPREHEN METABOLIC PANEL: CPT | Performed by: EMERGENCY MEDICINE

## 2024-08-21 PROCEDURE — 37000008 HC ANESTHESIA 1ST 15 MINUTES: Performed by: INTERNAL MEDICINE

## 2024-08-21 PROCEDURE — 86901 BLOOD TYPING SEROLOGIC RH(D): CPT | Performed by: EMERGENCY MEDICINE

## 2024-08-21 PROCEDURE — 83735 ASSAY OF MAGNESIUM: CPT | Performed by: EMERGENCY MEDICINE

## 2024-08-21 PROCEDURE — 80100014 HC HEMODIALYSIS 1:1

## 2024-08-21 PROCEDURE — P9016 RBC LEUKOCYTES REDUCED: HCPCS | Performed by: EMERGENCY MEDICINE

## 2024-08-21 PROCEDURE — P9016 RBC LEUKOCYTES REDUCED: HCPCS | Performed by: ANESTHESIOLOGY

## 2024-08-21 PROCEDURE — 20000000 HC ICU ROOM

## 2024-08-21 PROCEDURE — 99223 1ST HOSP IP/OBS HIGH 75: CPT | Mod: ,,,

## 2024-08-21 PROCEDURE — 85025 COMPLETE CBC W/AUTO DIFF WBC: CPT | Performed by: EMERGENCY MEDICINE

## 2024-08-21 PROCEDURE — 93010 ELECTROCARDIOGRAM REPORT: CPT | Mod: ,,, | Performed by: INTERNAL MEDICINE

## 2024-08-21 PROCEDURE — 37000009 HC ANESTHESIA EA ADD 15 MINS: Performed by: INTERNAL MEDICINE

## 2024-08-21 PROCEDURE — D9220A PRA ANESTHESIA: Mod: CRNA,,,

## 2024-08-21 PROCEDURE — 36415 COLL VENOUS BLD VENIPUNCTURE: CPT | Performed by: HOSPITALIST

## 2024-08-21 PROCEDURE — 99223 1ST HOSP IP/OBS HIGH 75: CPT | Mod: 25,,, | Performed by: NURSE PRACTITIONER

## 2024-08-21 PROCEDURE — 85025 COMPLETE CBC W/AUTO DIFF WBC: CPT | Mod: 91 | Performed by: HOSPITALIST

## 2024-08-21 PROCEDURE — 86920 COMPATIBILITY TEST SPIN: CPT | Performed by: ANESTHESIOLOGY

## 2024-08-21 PROCEDURE — 85610 PROTHROMBIN TIME: CPT | Performed by: EMERGENCY MEDICINE

## 2024-08-21 RX ORDER — IBUPROFEN 200 MG
24 TABLET ORAL
Status: DISCONTINUED | OUTPATIENT
Start: 2024-08-21 | End: 2024-08-21

## 2024-08-21 RX ORDER — TAMSULOSIN HYDROCHLORIDE 0.4 MG/1
2 CAPSULE ORAL DAILY
Status: DISCONTINUED | OUTPATIENT
Start: 2024-08-21 | End: 2024-08-31 | Stop reason: HOSPADM

## 2024-08-21 RX ORDER — INSULIN ASPART 100 [IU]/ML
0-10 INJECTION, SOLUTION INTRAVENOUS; SUBCUTANEOUS
Status: DISCONTINUED | OUTPATIENT
Start: 2024-08-21 | End: 2024-08-21

## 2024-08-21 RX ORDER — HYDRALAZINE HYDROCHLORIDE 20 MG/ML
10 INJECTION INTRAMUSCULAR; INTRAVENOUS EVERY 8 HOURS PRN
Status: DISCONTINUED | OUTPATIENT
Start: 2024-08-21 | End: 2024-08-31 | Stop reason: HOSPADM

## 2024-08-21 RX ORDER — MUPIROCIN 20 MG/G
OINTMENT TOPICAL 2 TIMES DAILY
OUTPATIENT
Start: 2024-08-21 | End: 2024-08-26

## 2024-08-21 RX ORDER — FERROUS GLUCONATE 324(37.5)
324 TABLET ORAL
Status: DISCONTINUED | OUTPATIENT
Start: 2024-08-22 | End: 2024-08-22

## 2024-08-21 RX ORDER — POLYETHYLENE GLYCOL 3350 17 G/17G
17 POWDER, FOR SOLUTION ORAL 2 TIMES DAILY PRN
Status: DISCONTINUED | OUTPATIENT
Start: 2024-08-21 | End: 2024-08-31 | Stop reason: HOSPADM

## 2024-08-21 RX ORDER — HYDROCODONE BITARTRATE AND ACETAMINOPHEN 500; 5 MG/1; MG/1
TABLET ORAL
Status: DISCONTINUED | OUTPATIENT
Start: 2024-08-21 | End: 2024-08-22

## 2024-08-21 RX ORDER — IBUPROFEN 200 MG
16 TABLET ORAL
Status: DISCONTINUED | OUTPATIENT
Start: 2024-08-21 | End: 2024-08-21

## 2024-08-21 RX ORDER — PANTOPRAZOLE SODIUM 40 MG/10ML
80 INJECTION, POWDER, LYOPHILIZED, FOR SOLUTION INTRAVENOUS
Status: COMPLETED | OUTPATIENT
Start: 2024-08-21 | End: 2024-08-21

## 2024-08-21 RX ORDER — ROCURONIUM BROMIDE 10 MG/ML
INJECTION, SOLUTION INTRAVENOUS
Status: DISCONTINUED | OUTPATIENT
Start: 2024-08-21 | End: 2024-08-21

## 2024-08-21 RX ORDER — SEVELAMER CARBONATE 800 MG/1
800 TABLET, FILM COATED ORAL
Status: DISCONTINUED | OUTPATIENT
Start: 2024-08-21 | End: 2024-08-31 | Stop reason: HOSPADM

## 2024-08-21 RX ORDER — PROPOFOL 10 MG/ML
VIAL (ML) INTRAVENOUS
Status: DISCONTINUED | OUTPATIENT
Start: 2024-08-21 | End: 2024-08-21

## 2024-08-21 RX ORDER — ONDANSETRON HYDROCHLORIDE 2 MG/ML
8 INJECTION, SOLUTION INTRAVENOUS EVERY 6 HOURS PRN
Status: DISCONTINUED | OUTPATIENT
Start: 2024-08-21 | End: 2024-08-31 | Stop reason: HOSPADM

## 2024-08-21 RX ORDER — OXYCODONE AND ACETAMINOPHEN 10; 325 MG/1; MG/1
1 TABLET ORAL EVERY 8 HOURS PRN
Status: DISCONTINUED | OUTPATIENT
Start: 2024-08-21 | End: 2024-08-31 | Stop reason: HOSPADM

## 2024-08-21 RX ORDER — MUPIROCIN 20 MG/G
OINTMENT TOPICAL 2 TIMES DAILY
Status: DISPENSED | OUTPATIENT
Start: 2024-08-21 | End: 2024-08-26

## 2024-08-21 RX ORDER — LABETALOL HYDROCHLORIDE 5 MG/ML
INJECTION, SOLUTION INTRAVENOUS
Status: COMPLETED
Start: 2024-08-21 | End: 2024-08-21

## 2024-08-21 RX ORDER — FINASTERIDE 5 MG/1
5 TABLET, FILM COATED ORAL DAILY
Status: DISCONTINUED | OUTPATIENT
Start: 2024-08-21 | End: 2024-08-31 | Stop reason: HOSPADM

## 2024-08-21 RX ORDER — ACETAMINOPHEN 325 MG/1
650 TABLET ORAL EVERY 4 HOURS PRN
Status: DISCONTINUED | OUTPATIENT
Start: 2024-08-21 | End: 2024-08-31 | Stop reason: HOSPADM

## 2024-08-21 RX ORDER — ATORVASTATIN CALCIUM 40 MG/1
80 TABLET, FILM COATED ORAL DAILY
Status: DISCONTINUED | OUTPATIENT
Start: 2024-08-21 | End: 2024-08-31 | Stop reason: HOSPADM

## 2024-08-21 RX ORDER — FENTANYL CITRATE 50 UG/ML
INJECTION, SOLUTION INTRAMUSCULAR; INTRAVENOUS
Status: DISCONTINUED | OUTPATIENT
Start: 2024-08-21 | End: 2024-08-21

## 2024-08-21 RX ORDER — LABETALOL HYDROCHLORIDE 5 MG/ML
INJECTION, SOLUTION INTRAVENOUS
Status: DISCONTINUED | OUTPATIENT
Start: 2024-08-21 | End: 2024-08-21

## 2024-08-21 RX ORDER — GLUCAGON 1 MG
1 KIT INJECTION
Status: DISCONTINUED | OUTPATIENT
Start: 2024-08-21 | End: 2024-08-21

## 2024-08-21 RX ORDER — LIDOCAINE HYDROCHLORIDE 20 MG/ML
INJECTION INTRAVENOUS
Status: DISCONTINUED | OUTPATIENT
Start: 2024-08-21 | End: 2024-08-21

## 2024-08-21 RX ADMIN — LIDOCAINE HYDROCHLORIDE 100 MG: 20 INJECTION, SOLUTION INTRAVENOUS at 07:08

## 2024-08-21 RX ADMIN — SUGAMMADEX 200 MG: 100 INJECTION, SOLUTION INTRAVENOUS at 07:08

## 2024-08-21 RX ADMIN — LABETALOL HYDROCHLORIDE 10 MG: 5 INJECTION, SOLUTION INTRAVENOUS at 08:08

## 2024-08-21 RX ADMIN — PROPOFOL 100 MG: 10 INJECTION, EMULSION INTRAVENOUS at 07:08

## 2024-08-21 RX ADMIN — PANTOPRAZOLE SODIUM 80 MG: 40 INJECTION, POWDER, LYOPHILIZED, FOR SOLUTION INTRAVENOUS at 08:08

## 2024-08-21 RX ADMIN — PANTOPRAZOLE SODIUM 8 MG/HR: 40 INJECTION, POWDER, FOR SOLUTION INTRAVENOUS at 08:08

## 2024-08-21 RX ADMIN — ONDANSETRON 8 MG: 2 INJECTION INTRAMUSCULAR; INTRAVENOUS at 04:08

## 2024-08-21 RX ADMIN — FENTANYL CITRATE 50 MCG: 50 INJECTION, SOLUTION INTRAMUSCULAR; INTRAVENOUS at 07:08

## 2024-08-21 RX ADMIN — ROCURONIUM BROMIDE 50 MG: 10 INJECTION INTRAVENOUS at 07:08

## 2024-08-21 RX ADMIN — PANTOPRAZOLE SODIUM 8 MG/HR: 40 INJECTION, POWDER, FOR SOLUTION INTRAVENOUS at 02:08

## 2024-08-21 RX ADMIN — PROPOFOL 30 MG: 10 INJECTION, EMULSION INTRAVENOUS at 07:08

## 2024-08-21 RX ADMIN — PANTOPRAZOLE SODIUM 8 MG/HR: 40 INJECTION, POWDER, FOR SOLUTION INTRAVENOUS at 11:08

## 2024-08-21 RX ADMIN — MUPIROCIN: 20 OINTMENT TOPICAL at 08:08

## 2024-08-21 NOTE — H&P
[facility]  History & Physical    Patient Name: Miguel Moore  MRN: [id number]  Patient Class: IP- Inpatient  Admission Date: 8/21/2024  Attending Physician: Barrie Negro MD   Primary Care Provider: Raciel Raymond MD         Patient information was obtained from patient and ER records.     Subjective:     Principal Problem:GI bleed    Chief Complaint:   Chief Complaint   Patient presents with    Abdominal Pain     Pt presents to the ED via EMS with c/o lower abdominal pain with n/v since today with 1-2 episodes of hematemesis with dark red blood. Pt is MWF dialysis pt and did not go today. Pt is bedbound and denies hx of GI bleeds.        HPI: 68 y/o male with a PMHx of DVT, PAF on Eliquis, DMT2, HTN, seizures, stroke, presents to the ER for evaluation of coffee ground emesis and darks stool that started early this morning.  Patient reports 2 episodes of coffee ground emesis and 1 episode of dark stool.  Also reports associated abdominal pain which has now resolved.  No alleviating or aggravating factors.  Patient has not taken his Eliquis today.  He presented to ER where he was noted to be tachycardic.  Labs showing Hgb much lower than baseline.  He denies any fever or chills.  No other complaints.    Past Medical History:   Diagnosis Date    Allergy     Clotting disorder     Elaquis and APlavix    Deep vein thrombosis     Diabetes mellitus, type 2     Hypertension     Nuclear sclerosis of both eyes 6/9/2017    Renal disorder     Seizures     Stroke     Urinary tract infection        Past Surgical History:   Procedure Laterality Date    CATARACT EXTRACTION W/  INTRAOCULAR LENS IMPLANT Right 10/05/2017    Dr. Tay    CATARACT EXTRACTION W/  INTRAOCULAR LENS IMPLANT Left 10/19/2017    Dr. Tay    CYSTOSCOPY W/ RETROGRADES Bilateral 2/18/2021    Procedure: CYSTOSCOPY, WITH RETROGRADE PYELOGRAM;  Surgeon: JEMMA Styles MD;  Location: [facility] OR;  Service:  Urology;  Laterality: Bilateral;  REQUESTING EARLY AS POSSIBE-LO / 2/8/2021 @ 1:13PM  RN Pre Op 2-11-21, Covid NEGATIVE ON  2-17-21.  C A    ESOPHAGOGASTRODUODENOSCOPY N/A 8/17/2020    Procedure: EGD (ESOPHAGOGASTRODUODENOSCOPY);  Surgeon: Desmond Chapman MD;  Location: [facility] ENDO;  Service: Endoscopy;  Laterality: N/A;    EYE SURGERY Bilateral     cataract    FEMORAL ARTERY STENT      FRACTURE SURGERY      INCISION AND DRAINAGE, LOWER EXTREMITY Right 4/4/2024    Procedure: INCISION AND DRAINAGE, LOWER EXTREMITY;  Surgeon: Jimbo Montilla III, MD;  Location: [facility] OR;  Service: Orthopedics;  Laterality: Right;    INCISION AND DRAINAGE, LOWER EXTREMITY Right 4/6/2024    Procedure: INCISION AND DRAINAGE, LOWER EXTREMITY;  Surgeon: Desmond Barillas MD;  Location: [facility] OR;  Service: Orthopedics;  Laterality: Right;  foot and ankle    INCISION AND DRAINAGE, LOWER EXTREMITY Right 4/9/2024    Procedure: INCISION AND DRAINAGE, LOWER EXTREMITY- FOOT & ANKLE;  Surgeon: Jimbo Montilla III, MD;  Location: [facility] OR;  Service: Orthopedics;  Laterality: Right;  CULTURES, VASCHE    INCISION AND DRAINAGE, LOWER EXTREMITY Right 5/21/2024    Procedure: INCISION AND DRAINAGE, LOWER EXTREMITY;  Surgeon: Jimbo Montilla III, MD;  Location: [facility] OR;  Service: Orthopedics;  Laterality: Right;  Pulsavac and Vashe    KNEE SURGERY      bilateral scope    WOUND DRESSING Right 4/9/2024    Procedure: WOUND VAC EXCHANGE;  Surgeon: Jimbo Montilla III, MD;  Location: [facility] OR;  Service: Orthopedics;  Laterality: Right;       Review of patient's allergies indicates:   Allergen Reactions    Ace inhibitors      Hyperkalemia 8/2018  Other reaction(s): Unknown    Penicillins Hives     Tolerated cefepime and cefdinir previously    Tizanidine      Felt hot       No current facility-administered medications on file prior to encounter.     Current Outpatient Medications on File Prior to Encounter   Medication Sig    atorvastatin (LIPITOR) 80 MG  tablet Take 1 tablet (80 mg total) by mouth once daily.    ferrous gluconate (FERGON) 324 MG tablet Take 1 tablet (324 mg total) by mouth daily with breakfast.    finasteride (PROSCAR) 5 mg tablet Take 1 tablet (5 mg total) by mouth once daily.    oxyCODONE-acetaminophen (PERCOCET)  mg per tablet Take 1 tablet by mouth every 24 hours as needed for Pain. Medically necessary more than 7 days    RENVELA 800 mg Tab Take 1 tablet (800 mg total) by mouth 3 (three) times daily with meals.    tamsulosin (FLOMAX) 0.4 mg Cap Take 2 capsules (0.8 mg total) by mouth once daily.    vitamin renal formula, B-complex-vitamin c-folic acid, (NEPHROCAP) 1 mg Cap Take 1 capsule by mouth once daily.    [DISCONTINUED] apixaban (ELIQUIS) 5 mg Tab Take 1 tablet (5 mg total) by mouth 2 (two) times daily.    [DISCONTINUED] aspirin (ECOTRIN) 81 MG EC tablet Take 1 tablet (81 mg total) by mouth once daily.     Family History       Problem Relation (Age of Onset)    Cataracts Mother    Diabetes Mother    Heart disease Mother    Hypertension Mother, Sister    No Known Problems Father, Brother, Maternal Aunt, Maternal Uncle, Paternal Aunt, Paternal Uncle, Maternal Grandmother, Maternal Grandfather, Paternal Grandmother, Paternal Grandfather, Daughter, Other          Tobacco Use    Smoking status: Never    Smokeless tobacco: Never   Substance and Sexual Activity    Alcohol use: No    Drug use: No    Sexual activity: Not on file     Review of Systems   Constitutional:  Negative for chills and fever.   HENT:  Negative for ear discharge and ear pain.    Eyes:  Negative for discharge and itching.   Respiratory:  Negative for cough and shortness of breath.    Cardiovascular:  Negative for chest pain and palpitations.   Gastrointestinal:  Positive for abdominal pain and vomiting.   Endocrine: Negative for cold intolerance and heat intolerance.   Genitourinary:  Negative for frequency and hematuria.   Musculoskeletal:  Negative for neck pain and  neck stiffness.   Skin:  Positive for wound. Negative for rash.   Neurological:  Negative for seizures and syncope.   Psychiatric/Behavioral:  Negative for agitation and hallucinations.      Objective:     Vital Signs (Most Recent):  Temp: 97.3 °F (36.3 °C) (08/21/24 1015)  Pulse: (!) 116 (08/21/24 1300)  Resp: (!) 6 (08/21/24 1300)  BP: (!) 140/63 (08/21/24 1300)  SpO2: 100 % (08/21/24 1300) Vital Signs (24h Range):  Temp:  [97.3 °F (36.3 °C)-98.1 °F (36.7 °C)] 97.3 °F (36.3 °C)  Pulse:  [] 116  Resp:  [6-24] 6  SpO2:  [97 %-100 %] 100 %  BP: (106-147)/(54-76) 140/63     Weight: 72.6 kg (160 lb)  Body mass index is 24.33 kg/m².     Physical Exam  Constitutional:       Appearance: He is ill-appearing. He is not diaphoretic.   HENT:      Head: Normocephalic and atraumatic.      Mouth/Throat:      Pharynx: Oropharynx is clear. No oropharyngeal exudate or posterior oropharyngeal erythema.   Cardiovascular:      Rate and Rhythm: Regular rhythm. Tachycardia present.   Pulmonary:      Effort: No respiratory distress.      Breath sounds: Normal breath sounds.   Abdominal:      General: Bowel sounds are normal.      Palpations: Abdomen is soft.   Musculoskeletal:      Right lower leg: No edema.      Left lower leg: No edema.   Skin:     Comments: Bilateral feet dressed   Neurological:      General: No focal deficit present.      Cranial Nerves: No cranial nerve deficit.                Significant Labs: All pertinent labs within the past 24 hours have been reviewed.  BMP:   Recent Labs   Lab 08/21/24  0815   *      K 4.1      CO2 25   BUN 86*   CREATININE 8.2*   CALCIUM 8.2*   MG 2.0     CBC:   Recent Labs   Lab 08/21/24  0815   WBC 7.54   HGB 5.9*   HCT 18.2*          Significant Imaging: I have reviewed all pertinent imaging results/findings within the past 24 hours.  Assessment/Plan:     * GI bleed  Patient has acute blood loss due to hemorrhage, the hemorrhage is due to gastrointestinal  "bleed, patient does have a propensity for bleeding due to a medication, the medication is Eliquis.. Will trend hemoglobin/hematocrit Every 8 hours, as well as monitor and correct for any coagulation defects. CBC and vital signs have been reviewed and last CBC was noted-   Lab Results   Component Value Date    WBC 7.54 08/21/2024    HGB 5.9 (LL) 08/21/2024    HCT 18.2 (LL) 08/21/2024    MCV 78 (L) 08/21/2024     08/21/2024   Patient has been started on GI bleed pathway orders.  GI consulted and patient has been started on Protonix drip.  Eliquis held.  Transfuse 2 units of blood and closely monitor CBC.  Possible EGD today.    Paroxysmal atrial fibrillation  Patient with Paroxysmal (<7 days) atrial fibrillation which is controlled. Patient is currently in sinus rhythm.[id number] Score: 3  Hold home Eliquis with possible active bleeding.    Anemia in chronic kidney disease  Anemia is likely due to acute blood loss which was from GI bleed and chronic disease due to ESRD. Most recent hemoglobin and hematocrit are listed below.  Recent Labs     08/21/24  0815   HGB 5.9*   HCT 18.2*     Plan  Will transfuse 2 units of blood.  Continue iron replacement.    Chronic deep vein thrombosis (DVT) of popliteal vein of right lower extremity 9/21/20 outside [place]; unprovoked; anticoagulation  Hold Eliquis with GI bleed.      Type 2 diabetes mellitus with diabetic neuropathy, with long-term current use of insulin  Patient's FSGs are controlled on current medication regimen.  Last A1c reviewed-   Lab Results   Component Value Date    HGBA1C 5.2 06/24/2024     Most recent fingerstick glucose reviewed- No results for input(s): "POCTGLUCOSE" in the last 24 hours.  Current correctional scale  Medium  Maintain anti-hyperglycemic dose as follows-   Antihyperglycemics (From admission, onward)      None          Hold Oral hypoglycemics while patient is in the hospital.    ESRD (end stage renal disease)  Creatine stable for now. BMP " reviewed- noted Estimated Creatinine Clearance: 8.1 mL/min (A) (based on SCr of 8.2 mg/dL (H)). according to latest data. Based on current GFR, CKD stage is end stage.  Monitor UOP and serial BMP and adjust therapy as needed. Renally dose meds. Avoid nephrotoxic medications and procedures.  Nephrology consulted for HD.    Hemiplegia and hemiparesis following cerebral infarction affecting right dominant side  Stable       BPH (benign prostatic hyperplasia) 2/18/21 prostate bx normal  Continue home medications.      Dyslipidemia  Continue statin      Benign essential HTN  Chronic, controlled. Latest blood pressure and vitals reviewed-     Temp:  [97.3 °F (36.3 °C)-98.1 °F (36.7 °C)]   Pulse:  []   Resp:  [6-24]   BP: (106-147)/(54-76)   SpO2:  [97 %-100 %] .   No Bp medications on home med rec.  Low Na diet once advanced.  Will utilize p.r.n. blood pressure medication only if patient's blood pressure greater than 180/110 and he develops symptoms such as worsening chest pain or shortness of breath.      VTE Risk Mitigation (From admission, onward)           Ordered     IP VTE HIGH RISK PATIENT  Once         08/21/24 0957     Place sequential compression device  Until discontinued         08/21/24 0957                  Critical care time spent on the evaluation and treatment of severe organ dysfunction, review of pertinent labs and imaging studies, discussions with consulting providers and discussions with patient/family: 50 minutes.                  Barrie Negro MD  Department of Hospital Medicine  [facility] [place] - Intensive Care

## 2024-08-21 NOTE — ASSESSMENT & PLAN NOTE
Creatine stable for now. BMP reviewed- noted Estimated Creatinine Clearance: 8.1 mL/min (A) (based on SCr of 8.2 mg/dL (H)). according to latest data. Based on current GFR, CKD stage is end stage.  Monitor UOP and serial BMP and adjust therapy as needed. Renally dose meds. Avoid nephrotoxic medications and procedures.  Nephrology consulted for HD.

## 2024-08-21 NOTE — ED NOTES
Pt assisted off of bed. Large dark tarry stool noted. MD aware. Perineal care given. Linens changed.  Patient on cardiac monitor, automatic blood pressure cuff and pulse oximeter. Comfort measures initiated. Care plan discussed with pt. Call light with reach of pt. Will continue to monitor.

## 2024-08-21 NOTE — ED TRIAGE NOTES
Pt presents to the ER via EMS c/o ABD pain, Coffee ground emesis, and blood in stool. Pt currently on blood thinners for his A-fib. Pt is on  hemodialysis.

## 2024-08-21 NOTE — ASSESSMENT & PLAN NOTE
"Patient's FSGs are controlled on current medication regimen.  Last A1c reviewed-   Lab Results   Component Value Date    HGBA1C 5.2 06/24/2024     Most recent fingerstick glucose reviewed- No results for input(s): "POCTGLUCOSE" in the last 24 hours.  Current correctional scale  Medium  Maintain anti-hyperglycemic dose as follows-   Antihyperglycemics (From admission, onward)      None          Hold Oral hypoglycemics while patient is in the hospital.  "

## 2024-08-21 NOTE — ED PROVIDER NOTES
Encounter Date: 8/21/2024    SCRIBE #1 NOTE: IReno, am scribing for, and in the presence of,  Clement Jimenez MD.       History     Chief Complaint   Patient presents with    Abdominal Pain     Pt presents to the ED via EMS with c/o lower abdominal pain with n/v since today with 1-2 episodes of hematemesis with dark red blood. Pt is MWF dialysis pt and did not go today. Pt is bedbound and denies hx of GI bleeds.     68 y/o male with a PMHx of DVT, DMT2, HTN, seizures, stroke, presents to the ED for evaluation of hematemesis and hematochezia that started at 0500 this morning. Patient reports of 2 episodes of hematemesis and 1 episode of hematochezia. Also reports of associated abdominal pain, which has now resolved. Reports that he was in a normal state of health prior to bed last night. Patient denies cough, shortness of breath, chest pain, fever, chills, diarrhea, dysuria, headaches, congestion, sore throat, arm or leg trouble, eye pain, ear pain, rash, or other associated symptoms.        The history is provided by the patient. No  was used.     Review of patient's allergies indicates:   Allergen Reactions    Ace inhibitors      Hyperkalemia 8/2018  Other reaction(s): Unknown    Penicillins Hives     Tolerated cefepime and cefdinir previously    Tizanidine      Felt hot     Past Medical History:   Diagnosis Date    Allergy     Clotting disorder     Elaquis and APlavix    Deep vein thrombosis     Diabetes mellitus, type 2     Hypertension     Nuclear sclerosis of both eyes 6/9/2017    Renal disorder     Seizures     Stroke     Urinary tract infection      Past Surgical History:   Procedure Laterality Date    CATARACT EXTRACTION W/  INTRAOCULAR LENS IMPLANT Right 10/05/2017    Dr. Tay    CATARACT EXTRACTION W/  INTRAOCULAR LENS IMPLANT Left 10/19/2017    Dr. Tay    CYSTOSCOPY W/ RETROGRADES Bilateral 2/18/2021    Procedure: CYSTOSCOPY, WITH RETROGRADE PYELOGRAM;   Surgeon: JEMMA Styles MD;  Location: [facility] OR;  Service: Urology;  Laterality: Bilateral;  REQUESTING EARLY AS POSSIBE-LO / 2/8/2021 @ 1:13PM  RN Pre Op 2-11-21, Covid NEGATIVE ON  2-17-21.  C A    ESOPHAGOGASTRODUODENOSCOPY N/A 8/17/2020    Procedure: EGD (ESOPHAGOGASTRODUODENOSCOPY);  Surgeon: Desmond Chapman MD;  Location: [facility];  Service: Endoscopy;  Laterality: N/A;    EYE SURGERY Bilateral     cataract    FEMORAL ARTERY STENT      FRACTURE SURGERY      INCISION AND DRAINAGE, LOWER EXTREMITY Right 4/4/2024    Procedure: INCISION AND DRAINAGE, LOWER EXTREMITY;  Surgeon: Jimbo Montilla III, MD;  Location: [facility] OR;  Service: Orthopedics;  Laterality: Right;    INCISION AND DRAINAGE, LOWER EXTREMITY Right 4/6/2024    Procedure: INCISION AND DRAINAGE, LOWER EXTREMITY;  Surgeon: Desmond Barillas MD;  Location: [facility] OR;  Service: Orthopedics;  Laterality: Right;  foot and ankle    INCISION AND DRAINAGE, LOWER EXTREMITY Right 4/9/2024    Procedure: INCISION AND DRAINAGE, LOWER EXTREMITY- FOOT & ANKLE;  Surgeon: Jimbo Montilla III, MD;  Location: [facility] OR;  Service: Orthopedics;  Laterality: Right;  CULTURES, VASCHE    INCISION AND DRAINAGE, LOWER EXTREMITY Right 5/21/2024    Procedure: INCISION AND DRAINAGE, LOWER EXTREMITY;  Surgeon: Jimbo Montilla III, MD;  Location: [facility] OR;  Service: Orthopedics;  Laterality: Right;  Pulsavac and Vashe    KNEE SURGERY      bilateral scope    WOUND DRESSING Right 4/9/2024    Procedure: WOUND VAC EXCHANGE;  Surgeon: Jimbo Montilla III, MD;  Location: [facility] OR;  Service: Orthopedics;  Laterality: Right;     Family History   Problem Relation Name Age of Onset    Diabetes Mother      Heart disease Mother      Hypertension Mother      Cataracts Mother      No Known Problems Father      Hypertension Sister      No Known Problems Brother      No Known Problems Maternal Aunt      No Known Problems Maternal Uncle      No Known Problems Paternal Aunt      No Known  Problems Paternal Uncle      No Known Problems Maternal Grandmother      No Known Problems Maternal Grandfather      No Known Problems Paternal Grandmother      No Known Problems Paternal Grandfather      No Known Problems Daughter      No Known Problems Other      Amblyopia Neg Hx      Blindness Neg Hx      Cancer Neg Hx      Glaucoma Neg Hx      Macular degeneration Neg Hx      Retinal detachment Neg Hx      Strabismus Neg Hx      Stroke Neg Hx      Thyroid disease Neg Hx       Social History     Tobacco Use    Smoking status: Never    Smokeless tobacco: Never   Substance Use Topics    Alcohol use: No    Drug use: No     Review of Systems   Constitutional:  Negative for chills, diaphoresis and fever.   HENT:  Negative for congestion, ear pain and sore throat.    Eyes:  Negative for pain.   Respiratory:  Negative for cough and shortness of breath.    Cardiovascular:  Negative for chest pain.   Gastrointestinal:  Positive for abdominal pain, blood in stool, nausea and vomiting (Hematemesis). Negative for diarrhea.   Genitourinary:  Negative for dysuria.   Musculoskeletal:  Negative for back pain.        (-)Arm or leg troubles.   Skin:  Negative for rash.   Neurological:  Negative for headaches.   Psychiatric/Behavioral:  Negative for confusion.        Physical Exam     Initial Vitals [08/21/24 0701]   BP Pulse Resp Temp SpO2   118/64 98 18 98.1 °F (36.7 °C) 97 %      MAP       --         Physical Exam  The patient was examined specifically for the following:   General:No significant distress, Good color, Warm and dry. Head and neck:Scalp atraumatic, Neck supple. Neurological:Appropriate conversation, Gross motor deficits. Eyes:Conjugate gaze, Clear corneas. ENT: No epistaxis. Cardiac: Regular rate and rhythm, Grossly normal heart tones. Pulmonary: Wheezing, Rales. Gastrointestinal: Abdominal tenderness, Abdominal distention. Musculoskeletal: Extremity deformity, Apparent pain with range of motion of the joints.  Skin: Rash.   The findings on examination were normal except for the following:  The patient has wearing dressings on both ankles with heel protectors.  The lungs are clear.  The heart tones are normal.  The abdomen is nontender.  There is no guarding rebound mass or distention.  The patient is pale.  He has an obvious paralysis of the right upper extremity.  He was alert bright conversational with stable vital signs.  He has a dialysis fistula in the left humeral arm.  Rectal exam reveals black guaiac-positive stool.  ED Course   Critical Care    Date/Time: 8/21/2024 8:53 AM    Performed by: Clement Jimenez MD  Authorized by: Clement Jimenez MD  Direct patient critical care time: 22 minutes  Additional history critical care time: 11 minutes  Ordering / reviewing critical care time: 11 minutes  Documentation critical care time: 11 minutes  Consulting other physicians critical care time: 11 minutes  Total critical care time (exclusive of procedural time) : 66 minutes  Critical care time was exclusive of separately billable procedures and treating other patients and teaching time.  Critical care was necessary to treat or prevent imminent or life-threatening deterioration of the following conditions: circulatory failure.  Critical care was time spent personally by me on the following activities: development of treatment plan with patient or surrogate, discussions with consultants, discussions with primary provider, evaluation of patient's response to treatment, examination of patient, obtaining history from patient or surrogate, ordering and performing treatments and interventions, ordering and review of laboratory studies, re-evaluation of patient's condition and review of old charts.        Labs Reviewed   COMPREHENSIVE METABOLIC PANEL - Abnormal       Result Value    Sodium 142      Potassium 4.1      Chloride 101      CO2 25      Glucose 153 (*)     BUN 86 (*)     Creatinine 8.2 (*)     Calcium 8.2 (*)      Total Protein 5.6 (*)     Albumin 2.3 (*)     Total Bilirubin 0.6      Alkaline Phosphatase 38 (*)     AST 5 (*)     ALT <5 (*)     eGFR 6 (*)     Anion Gap 16     CBC W/ AUTO DIFFERENTIAL - Abnormal    WBC 7.54      RBC 2.32 (*)     Hemoglobin 5.9 (*)     Hematocrit 18.2 (*)     MCV 78 (*)     MCH 25.4 (*)     MCHC 32.4      RDW 18.1 (*)     Platelets 215      MPV 9.6      Immature Granulocytes 0.3      Gran # (ANC) 6.1      Immature Grans (Abs) 0.02      Lymph # 1.0      Mono # 0.4      Eos # 0.0      Baso # 0.02      nRBC 0      Gran % 80.7 (*)     Lymph % 13.1 (*)     Mono % 5.3      Eosinophil % 0.3      Basophil % 0.3      Differential Method Automated      Narrative:     HGB/HCT   critical result(s) called and verbal readback obtained from   EILEEN BAILEY @ 8:40AM by GALINDO 08/21/2024 08:39   PHOSPHORUS - Abnormal    Phosphorus 2.6 (*)    PROTIME-INR    Prothrombin Time 12.2      INR 1.1     MAGNESIUM    Magnesium 2.0     TYPE AND SCREEN PREOP    Group & Rh A POS      Indirect Henrry NEG     PREPARE RBC SOFT    UNIT NUMBER [id number]      Product Code O5156F11      DISPENSE STATUS CROSSMATCHED      CODING SYSTEM TYYU011      Unit Blood Type Code 6200      Unit Blood Type A POS      Unit Expiration [id number]      CROSSMATCH INTERPRETATION Compatible      UNIT NUMBER [id number]      Product Code R5970S41      DISPENSE STATUS CROSSMATCHED      CODING SYSTEM [id number]      Unit Blood Type Code 6200      Unit Blood Type A POS      Unit Expiration [id number]      CROSSMATCH INTERPRETATION Compatible       EKG Readings: (Independently Interpreted)   This patient is in a sinus tachycardia with a heart rate of 104 there are no significant ST segment or T-wave changes.  There is no definite evidence of acute myocardial infarction or malignant arrhythmia.  This is doctor Jimenez dictating an I independently interpreted this EKG.       Imaging Results    None          Medications   0.9%  NaCl infusion (for  blood administration) (has no administration in time range)   pantoprazole injection 80 mg (80 mg Intravenous Given 8/21/24 9624)     Medical Decision Making  Amount and/or Complexity of Data Reviewed  Labs: ordered. Decision-making details documented in ED Course.    Risk  Prescription drug management.    Given the above this patient presents emergency room with a hematemesis and blood in his stool.  He is an end-stage renal disease patient on hemodialysis.  I anticipate that he will require transfusion on hemodialysis for a hemoglobin of 5.9.  The patient will likely require EGD.  Gastroenterology and nephrology been consulted.  The patient has been submitted to case management for admission.  He has not yet been hypotensive.  There has been no hematemesis in the emergency room.  The patient has no significant abdominal pain or tenderness.  I doubt perforated viscus.  He is on Eliquis.  This is a complicating feature of his presentation.  He did not take his dose this morning.  The patient will be admitted to the ICU for dialysis and transfusion as well as evaluation by Gastroenterology.         Scribe Attestation:   Scribe #1: I performed the above scribed service and the documentation accurately describes the services I performed. I attest to the accuracy of the note.                               Clinical Impression:  Final diagnoses:  [K92.0] Hematemesis, unspecified whether nausea present (Primary)  [K92.2] Acute upper GI bleeding  [N18.6, Z99.2] End-stage renal disease on hemodialysis             Please note that the documentation on this chart was provided by the scribe above on the date of service noted above, and that the documentation in the chart accurately reflects the work and decisions made by me alone.  Signed, Dr. Jimenez   ED Disposition Condition    Admit Stable                Clement Jimenez MD  08/21/24 4182

## 2024-08-21 NOTE — CONSULTS
[facility] Consultation Note    Patient Complaint: black stools, black vomit    PCP:   Raciel Raymond       LOS: 0        Initial History of Present Illness (HPI):  This is a 69 y.o. male consulted to GI service for upper GI bleeding. PMH DVT, DMT2, HTN, seizures, stroke on eliquis. Patient complaint of acute onset of large volume coffee ground emesis with associated symptoms of abdominal discomfort, diaphoresis and black stools that began this am. He reports his last eliquis was Tuesday evening. Denies fever, chills, syncope, BRBPR or constipation. Denies NSAID use. Denies smoking or drinking. On hemodialysis MWF.     Hgb 5.9, bun 86, creat 3.2        Medical History:  has a past medical history of Allergy, Clotting disorder, Deep vein thrombosis, Diabetes mellitus, type 2, Hypertension, Nuclear sclerosis of both eyes (6/9/2017), Renal disorder, Seizures, Stroke, and Urinary tract infection.    Surgical History:  has a past surgical history that includes Knee surgery; Femoral artery stent; Esophagogastroduodenoscopy (N/A, 8/17/2020); Eye surgery (Bilateral); Cystoscopy w/ retrogrades (Bilateral, 2/18/2021); Fracture surgery; Cataract extraction w/  intraocular lens implant (Right, 10/05/2017); Cataract extraction w/  intraocular lens implant (Left, 10/19/2017); incision and drainage, lower extremity (Right, 4/4/2024); incision and drainage, lower extremity (Right, 4/6/2024); incision and drainage, lower extremity (Right, 4/9/2024); Wound dressing (Right, 4/9/2024); and incision and drainage, lower extremity (Right, 5/21/2024).      Objective Findings:    Vital Signs:  Temp:  [98.1 °F (36.7 °C)]   Pulse:  []   Resp:  [16-18]   BP: (106-147)/(58-76)   SpO2:  [97 %-100 %]   Body mass index is 24.33 kg/m².      Physical Exam  Vitals and nursing note reviewed.   Constitutional:       Appearance: Normal appearance.   HENT:      Head: Normocephalic.   Pulmonary:      Effort: Pulmonary effort is  normal.   Abdominal:      General: Bowel sounds are normal.      Palpations: Abdomen is soft.   Musculoskeletal:      Comments: Right sided hemiparesis   Skin:     General: Skin is warm and dry.   Neurological:      Mental Status: He is alert and oriented to person, place, and time.   Psychiatric:         Mood and Affect: Mood normal.         Behavior: Behavior normal.         Thought Content: Thought content normal.         Judgment: Judgment normal.               Labs:  Lab Results   Component Value Date    WBC 7.54 08/21/2024    HGB 5.9 (LL) 08/21/2024    HCT 18.2 (LL) 08/21/2024     08/21/2024    CHOL 190 04/27/2023    TRIG 106 04/27/2023    HDL 36 (L) 04/27/2023    ALT <5 (L) 08/21/2024    AST 5 (L) 08/21/2024     08/21/2024    K 4.1 08/21/2024     08/21/2024    CREATININE 8.2 (H) 08/21/2024    BUN 86 (H) 08/21/2024    CO2 25 08/21/2024    TSH 2.672 03/20/2024    PSA 10.2 (H) 11/19/2020    INR 1.1 08/21/2024    HGBA1C 5.2 06/24/2024             Endoscopy:            GI bleed. Melena. Coffee Ground emesis. Symptomatic anemia. Acute blood loss anemia. Current anticoagulant use.   Plan/ Recommendations:  1. Patient reports dark bloody vomit x2, black stools x2 prior to arrival. Nursing note reports large volume black tarry stool @0944. Hgb 5.9, agree with plan for transfusion. Continue to monitor counts. Rec ppi drip and plan for egd today.         Thank you so much for allowing us to participate in the care of Miguel Moore . Please contact us if you have any additional questions.    Bren Ovalles NP  Gastroenterology  [facility] [place] - Med Surg

## 2024-08-21 NOTE — CONSULTS
[facility] - Intensive Care  Wound Care  [facility]    Patient Name:  Miguel Moore   MRN:  [id number]  Date: 8/21/2024  Diagnosis: GI bleed    History:     Past Medical History:   Diagnosis Date    Allergy     Clotting disorder     Elaquis and APlavix    Deep vein thrombosis     Diabetes mellitus, type 2     Hypertension     Nuclear sclerosis of both eyes 6/9/2017    Renal disorder     Seizures     Stroke     Urinary tract infection        Social History     Socioeconomic History    Marital status: Single   Occupational History    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, etc   Tobacco Use    Smoking status: Never    Smokeless tobacco: Never   Substance and Sexual Activity    Alcohol use: No    Drug use: No   Social History Narrative    Lives with daughter       Precautions:     Allergies as of 08/21/2024 - Reviewed 08/21/2024   Allergen Reaction Noted    Ace inhibitors  09/03/2018    Penicillins Hives 01/13/2015    Tizanidine  06/30/2020       [facility] Assessment Details/Treatment     Active Problem List with Overview Notes    Diagnosis Date Noted    GI bleed 08/21/2024    Subacute osteomyelitis of right foot 05/13/2024    Atherosclerosis of native artery of right lower extremity with ulceration 05/13/2024    Open wound of right foot 05/10/2024    Peripheral artery disease 5/16/24 LE angio with angioplasty popliteal artery 04/25/2024     3/20/24 RLE arterial US Arterial vasculature of the right lower extremity is patent.  However, there is evidence of atherosclerotic change with stenosis at the level of popliteal artery.   5/16/24 LE arterial US  Sonogram suggest hemodynamically significant stenoses in the posterior tibial, peroneal and dorsalis pedis arteries.   5/16/24 L and R angiogram Balloon angioplasty of the popliteal artery with 5x60 Ultraverse balloon      Abdominal aortic atherosclerosis on CT 4/1/24 04/25/2024    Open wound 03/26/2024    Paroxysmal atrial fibrillation 03/20/2024     3/20/24 TTE LV  normal systolic function LVEF 55-60%.  Converted to sinus rhythm on amiodarone   Amiodarone stopped on hospital discharge due to possible contributor to transaminitis      Bilateral posterior capsular opacification 05/02/2023    Pseudophakia 01/06/2022    History of diabetic ulcer of foot     Elevated PSA 2/18/21 prostate bx normal 02/18/2021 2/18/21 prostate bx normal  1/25/24 MRI prostate PIRADS 2      Pulmonary nodule 1/2021 4 mm nodule. 01/06/2021 1/6/2021 CT abd/pelvis: 4 mm nodule in the right middle lobe      Chronic deep vein thrombosis (DVT) of popliteal vein of right lower extremity 9/21/20 outside US; unprovoked; anticoagulation 09/21/2020    History of fungal infection candida parasilosis hospitalization 8/2020 08/29/2020     -Found to have candidemia - source unclear  -infectious disease consulted, Started on micafungin and now converted to fluconazole  -Repeat cultures negative so far  -Dialysis line removed 8/28.   line replaced on 08/31 after line holiday.  -end date of fluconazole will be 09/11/2020.  Patient has ophthalmology follow-up scheduled on 09/08/2020. Tentative end date 9/11/2020 If no endophthalmitis. If noted to have endophthalmitis during optho visit, would need at least 4-6 weeks of treatment      Anemia in chronic kidney disease 08/26/2020    Type 2 diabetes mellitus with diabetic neuropathy, with long-term current use of insulin 05/10/2018    History of stroke 12/04/2017    CME (cystoid macular edema), bilateral 11/16/2017    Refractive error 11/16/2017    Senile nuclear sclerosis 10/05/2017    Right sided weakness 09/11/2017    Secondary hyperparathyroidism of renal origin 08/03/2017    Vitamin D deficiency 08/03/2017    Nuclear sclerosis, left 06/09/2017    Cortical cataract of both eyes 06/09/2017    DM type 2 without retinopathy 06/09/2017    Microcytosis 9/2019 labs c/w AoCD and AoCKD 03/22/2017     Seen on admission as well 2015; normal Hb, low MCV.  Normal  RDW  08/17/2020 EGD normal esophagus.  Discolored nodular and texture change mucosa in the antrum.  Normal duodenum.  Path with foveolar hyperplasia and early xanthoma formation.  H pylori negative.  Mild chronic inflammation without acute activity      ESRD (end stage renal disease) 03/22/2017 2/27/20 renal US with dopplers no ALF.  Medical renal disease  8/2020 renal US: 1. Symmetric diffusely increased cortical echogenicity and elevated resistive indices, nonspecific indicators of chronic medical renal disease.  2. Prostatomegaly.  Some of the provided images are suggestive of a distinct hyperechoic component of the prostate gland, of uncertain etiology or significance, and potentially artifactual.  Dedicated prostate ultrasound or MRI may be of benefit for further characterization.    Started on HD while hospitalized for progression to ESRD 8/2020  -Continue dialysis per nephrology  -Outpatient HD has been arranged  -Had planned discharge 8/27 if remained afebrile and cultures negative.  Unfortunately his blood culture grew Candida parapsilosis  -Dr. Gomez with ID consulted and case discussed with him.  Started micafungin 8/27 and now on fluconazole.  -Dialysis line removed after HD 8/28 and plan line holiday over weekend.  -new line by IR on 8/31, tolerated dialysis fluid.  -continue outpatient dialysis.      Benign essential HTN 03/21/2017 01/06/2021 TTE mild left atrial enlargement.  LV normal size and systolic function LVEF 55%.  Indeterminate diastolic function.  Mild right atrial enlargement.  Normal RV systolic function.  Normal RV size.  PA pressure 20.  Normal CVP.  Three.      Dyslipidemia 03/21/2017    BPH (benign prostatic hyperplasia) 2/18/21 prostate bx normal 03/21/2017 6/26/2017 renal US mod prostatomegaly.      Seizure disorder as sequela of cerebrovascular accident 03/21/2017    Hemiplegia and hemiparesis following cerebral infarction affecting right dominant side 03/21/2017       Consulted for wounds on feet  A 69 year old male admitted today to ICU from home with complaint of abdominal pain with nausea and vomiting. Hematemesis with dark red blood. Dialysis MWF- did not go today.   8/21 WBC 7.5 Hgb 5.0 Hct 14.9  Alb 2.3 Weight 72.6 kg   6/24 A1C 5.2  On Isolibrium mattress; Aiden score 15; bedbound; incontinent; wearing EHOB boots  4 Eyes Skin Assessment- Not available  Patient's right foot last assessed 5/22 for dressing change recommendations to right plantar foot for HH for discharge - treatment to plantar wound with Aquacel Ag hydrofiber packing and dry gauze to eschar on heel and plantar foot. Secured with Kerlix roll and EHOB boot to offload pressure.   Assessment:  Photodocumentation    Right plantar foot- Stage 4 pressure injury with opening 7.5 cm(L) 8 cm(W) with dark red base. Dressing saturated with drainage. No foul odor.   Toes viable. Picking leg up off bed without difficulty.    Right mid plantar wounds - Stage 4 pressure injury with dark red base. Small amount necrotic tissue on edges of wound. Dressing saturated with drainage without a foul odor. Two openings remain involving area 9 cm(L) 3.5 cm(W)    Right lateral heel- Unstageable pressure injury 1.5 cm circular opening filled with necrotic tissue. Small amount drainage on dressing removed.     Left medial heel- Unstageable pressure injury 5 cm circular area covered with necrotic tissue. Small amount drainage on dressing removed.   Treatment/Plan:  While in hospital, local wound care per nursing to all wounds every shift with Vashe moistened gauze to debride wounds. When discharged home, resume local wound care with Aquacel Ag hydrofiber.   Continue Pressure Injury Prevention Interventions.   Treatment plan discussed with nursing.   Recommendations made to primary team. Orders placed.     08/21/2024

## 2024-08-21 NOTE — ASSESSMENT & PLAN NOTE
Chronic, controlled. Latest blood pressure and vitals reviewed-     Temp:  [97.3 °F (36.3 °C)-98.1 °F (36.7 °C)]   Pulse:  []   Resp:  [6-24]   BP: (106-147)/(54-76)   SpO2:  [97 %-100 %] .   No Bp medications on home med rec.  Low Na diet once advanced.  Will utilize p.r.n. blood pressure medication only if patient's blood pressure greater than 180/110 and he develops symptoms such as worsening chest pain or shortness of breath.

## 2024-08-21 NOTE — ANESTHESIA PREPROCEDURE EVALUATION
08/21/2024  Miguel Moore is a 69 y.o., male.  70 y/o male presents to ED with hematemesis x 2 and hemtaochezia x 1.  On plavix and eliquis at home  hgb 5.9 >> 5.0 with no transfusions  Past Medical History:   Diagnosis Date    Allergy     Clotting disorder     Elaquis and APlavix    Deep vein thrombosis     Diabetes mellitus, type 2     Hypertension     Nuclear sclerosis of both eyes 6/9/2017    Renal disorder     Seizures     Stroke     Urinary tract infection          Pre-op Assessment    I have reviewed the Patient Summary Reports.     I have reviewed the Nursing Notes.    I have reviewed the Medications.     Review of Systems  Anesthesia Hx:  No problems with previous Anesthesia   Neg history of prior surgery.          Denies Family Hx of Anesthesia complications.    Denies Personal Hx of Anesthesia complications.                    Social:  Non-Smoker, No Alcohol Use       Hematology/Oncology:  Hematology Normal   Oncology Normal                                   EENT/Dental:  EENT/Dental Normal           Cardiovascular:  Exercise tolerance: good   Hypertension           hyperlipidemia                             Pulmonary:  Pulmonary Normal                       Renal/:  Chronic Renal Disease, ESRD, Dialysis   HD MWF             Hepatic/GI:  Hepatic/GI Normal                 Musculoskeletal:  Musculoskeletal Normal                Neurological:   CVA    Seizures                                Endocrine:  Diabetes, type 2           Dermatological:  Skin Normal    Psych:  Psychiatric Normal                  Lab Results   Component Value Date    WBC 7.50 08/21/2024    HGB 5.0 (LL) 08/21/2024    HCT 14.9 (LL) 08/21/2024    MCV 78 (L) 08/21/2024     08/21/2024         Chemistry        Component Value Date/Time     08/21/2024 0815    K 4.1 08/21/2024 0815     08/21/2024 0815    CO2 25  08/21/2024 0815    BUN 86 (H) 08/21/2024 0815    CREATININE 8.2 (H) 08/21/2024 0815     (H) 08/21/2024 0815        Component Value Date/Time    CALCIUM 8.2 (L) 08/21/2024 0815    ALKPHOS 38 (L) 08/21/2024 0815    AST 5 (L) 08/21/2024 0815    ALT <5 (L) 08/21/2024 0815    BILITOT 0.6 08/21/2024 0815    ESTGFRAFRICA 5 (L) 11/07/2021 0519    ESTGFRAFRICA 13 (A) 02/11/2021 1440    EGFRNONAA 11 (A) 02/11/2021 1440            Physical Exam  General: Cooperative, Alert and Oriented    Airway:  Mallampati: II   Mouth Opening: Normal  TM Distance: 4 - 6 cm  Tongue: Normal  Neck ROM: Normal ROM    Dental:  Periodontal disease  Only 4 teeth present  Chest/Lungs:  Normal Respiratory Rate    Heart:  Rate: Normal  Rhythm: Regular Rhythm        Anesthesia Plan  Type of Anesthesia, risks & benefits discussed:    Anesthesia Type: Gen ETT  Intra-op Monitoring Plan: Standard ASA Monitors  Induction:  IV and rapid sequence  Informed Consent: Informed consent signed with the Patient and all parties understand the risks and agree with anesthesia plan.  All questions answered. Patient consented to blood products? Yes  ASA Score: 3    Ready For Surgery From Anesthesia Perspective.     .

## 2024-08-21 NOTE — CONSULTS
"                                         Renal Consult    Date of Admission:  8/21/2024  7:02 AM        Chief Complaint:   Chief Complaint   Patient presents with    Abdominal Pain     Pt presents to the ED via EMS with c/o lower abdominal pain with n/v since today with 1-2 episodes of hematemesis with dark red blood. Pt is MWF dialysis pt and did not go today. Pt is bedbound and denies hx of GI bleeds.       HPI: 68 y/o male well known to me and with a PMHx. Significant for: ESRD on HD ([facility]) A-fib. On AOCs, DVT, DM-2, HTN, PAD, Hx. CVA and Seizure disorder who came to the ED for evaluation of hematemesis and hematochezia that started at 05:00 this morning.     As per H&P: "Patient reports of 2 episodes of hematemesis and 1 episode of hematochezia. Also reports of associated abdominal pain, which has now resolved. Reports that he was in a normal state of health prior to bed last night. Patient denies cough, shortness of breath, chest pain, fever, chills, diarrhea, dysuria, headaches, congestion, sore throat, arm or leg trouble, eye pain, ear pain, rash, or other associated symptoms".     PMH:  Past Medical History:   Diagnosis Date    Allergy     Clotting disorder     Elaquis and APlavix    Deep vein thrombosis     Diabetes mellitus, type 2     Hypertension     Nuclear sclerosis of both eyes 6/9/2017    Renal disorder     Seizures     Stroke     Urinary tract infection        PSH:  Past Surgical History:   Procedure Laterality Date    CATARACT EXTRACTION W/  INTRAOCULAR LENS IMPLANT Right 10/05/2017    Dr. Tay    CATARACT EXTRACTION W/  INTRAOCULAR LENS IMPLANT Left 10/19/2017    Dr. Tay    CYSTOSCOPY W/ RETROGRADES Bilateral 2/18/2021    Procedure: CYSTOSCOPY, WITH RETROGRADE PYELOGRAM;  Surgeon: JEMMA Styles MD;  Location: [facility] OR;  Service: Urology;  Laterality: Bilateral;  REQUESTING EARLY AS POSSIBE-LO / 2/8/2021 @ 1:13PM  RN Pre Op 2-11-21, Covid NEGATIVE ON  2-17-21.  C A    " ESOPHAGOGASTRODUODENOSCOPY N/A 8/17/2020    Procedure: EGD (ESOPHAGOGASTRODUODENOSCOPY);  Surgeon: Desmond Chapman MD;  Location: [facility] ENDO;  Service: Endoscopy;  Laterality: N/A;    EYE SURGERY Bilateral     cataract    FEMORAL ARTERY STENT      FRACTURE SURGERY      INCISION AND DRAINAGE, LOWER EXTREMITY Right 4/4/2024    Procedure: INCISION AND DRAINAGE, LOWER EXTREMITY;  Surgeon: Jimbo Montilla III, MD;  Location: [facility] OR;  Service: Orthopedics;  Laterality: Right;    INCISION AND DRAINAGE, LOWER EXTREMITY Right 4/6/2024    Procedure: INCISION AND DRAINAGE, LOWER EXTREMITY;  Surgeon: Desmond Barillas MD;  Location: [facility] OR;  Service: Orthopedics;  Laterality: Right;  foot and ankle    INCISION AND DRAINAGE, LOWER EXTREMITY Right 4/9/2024    Procedure: INCISION AND DRAINAGE, LOWER EXTREMITY- FOOT & ANKLE;  Surgeon: Jimbo Montilla III, MD;  Location: [facility] OR;  Service: Orthopedics;  Laterality: Right;  CULTURES, VASCHE    INCISION AND DRAINAGE, LOWER EXTREMITY Right 5/21/2024    Procedure: INCISION AND DRAINAGE, LOWER EXTREMITY;  Surgeon: Jimbo Montilla III, MD;  Location: [facility] OR;  Service: Orthopedics;  Laterality: Right;  Pulsavac and Vashe    KNEE SURGERY      bilateral scope    WOUND DRESSING Right 4/9/2024    Procedure: WOUND VAC EXCHANGE;  Surgeon: Jimbo Montilla III, MD;  Location: [facility] OR;  Service: Orthopedics;  Laterality: Right;       Allergies:  Review of patient's allergies indicates:   Allergen Reactions    Ace inhibitors      Hyperkalemia 8/2018  Other reaction(s): Unknown    Penicillins Hives     Tolerated cefepime and cefdinir previously    Tizanidine      Felt hot       No current facility-administered medications on file prior to encounter.     Current Outpatient Medications on File Prior to Encounter   Medication Sig Dispense Refill    atorvastatin (LIPITOR) 80 MG tablet Take 1 tablet (80 mg total) by mouth once daily. 90 tablet 3    ferrous gluconate (FERGON) 324 MG tablet Take  1 tablet (324 mg total) by mouth daily with breakfast. 90 tablet 1    finasteride (PROSCAR) 5 mg tablet Take 1 tablet (5 mg total) by mouth once daily. 90 tablet 3    oxyCODONE-acetaminophen (PERCOCET)  mg per tablet Take 1 tablet by mouth every 24 hours as needed for Pain. Medically necessary more than 7 days 30 tablet 0    RENVELA 800 mg Tab Take 1 tablet (800 mg total) by mouth 3 (three) times daily with meals. 90 tablet 0    tamsulosin (FLOMAX) 0.4 mg Cap Take 2 capsules (0.8 mg total) by mouth once daily. 180 capsule 3    vitamin renal formula, B-complex-vitamin c-folic acid, (NEPHROCAP) 1 mg Cap Take 1 capsule by mouth once daily. 30 capsule 11    [DISCONTINUED] apixaban (ELIQUIS) 5 mg Tab Take 1 tablet (5 mg total) by mouth 2 (two) times daily. 180 tablet 3    [DISCONTINUED] aspirin (ECOTRIN) 81 MG EC tablet Take 1 tablet (81 mg total) by mouth once daily. 90 tablet 3       Medications:  Current Facility-Administered Medications   Medication Dose Route Frequency Provider Last Rate Last Admin    0.9%  NaCl infusion (for blood administration)   Intravenous Q24H PRN Clement Jimenez MD         Current Outpatient Medications   Medication Sig Dispense Refill    atorvastatin (LIPITOR) 80 MG tablet Take 1 tablet (80 mg total) by mouth once daily. 90 tablet 3    ferrous gluconate (FERGON) 324 MG tablet Take 1 tablet (324 mg total) by mouth daily with breakfast. 90 tablet 1    finasteride (PROSCAR) 5 mg tablet Take 1 tablet (5 mg total) by mouth once daily. 90 tablet 3    oxyCODONE-acetaminophen (PERCOCET)  mg per tablet Take 1 tablet by mouth every 24 hours as needed for Pain. Medically necessary more than 7 days 30 tablet 0    RENVELA 800 mg Tab Take 1 tablet (800 mg total) by mouth 3 (three) times daily with meals. 90 tablet 0    tamsulosin (FLOMAX) 0.4 mg Cap Take 2 capsules (0.8 mg total) by mouth once daily. 180 capsule 3    vitamin renal formula, B-complex-vitamin c-folic acid, (NEPHROCAP) 1 mg Cap  Take 1 capsule by mouth once daily. 30 capsule 11       FamHx:  Family History   Problem Relation Name Age of Onset    Diabetes Mother      Heart disease Mother      Hypertension Mother      Cataracts Mother      No Known Problems Father      Hypertension Sister      No Known Problems Brother      No Known Problems Maternal Aunt      No Known Problems Maternal Uncle      No Known Problems Paternal Aunt      No Known Problems Paternal Uncle      No Known Problems Maternal Grandmother      No Known Problems Maternal Grandfather      No Known Problems Paternal Grandmother      No Known Problems Paternal Grandfather      No Known Problems Daughter      No Known Problems Other      Amblyopia Neg Hx      Blindness Neg Hx      Cancer Neg Hx      Glaucoma Neg Hx      Macular degeneration Neg Hx      Retinal detachment Neg Hx      Strabismus Neg Hx      Stroke Neg Hx      Thyroid disease Neg Hx         SocHx:  Social History     Socioeconomic History    Marital status: Single   Occupational History    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, etc   Tobacco Use    Smoking status: Never    Smokeless tobacco: Never   Substance and Sexual Activity    Alcohol use: No    Drug use: No   Social History Narrative    Lives with daughter           Review of Systems: see H&P       Physical Exam:  Vitals:   Vitals:    08/21/24 0701   BP: 118/64   Pulse: 98   Resp: 18   Temp: 98.1 °F (36.7 °C)       No intake/output data recorded.  No intake/output data recorded.    General: No apparent distress.   Neck: supple   Lungs: Unlabored breathing  Heart: RRR  Abdomen: n/a  Ext: n/a  Neurologic: awake      Laboratories:    Recent Labs   Lab 08/21/24  0815   WBC 7.54   RBC 2.32*   HGB 5.9*   HCT 18.2*      MCV 78*   MCH 25.4*   MCHC 32.4       Recent Labs   Lab 08/21/24  0815   CALCIUM 8.2*   ALBUMIN 2.3*   PROT 5.6*      K 4.1   CO2 25      BUN 86*   CREATININE 8.2*   ALKPHOS 38*   ALT <5*   AST 5*   BILITOT 0.6  "      No results for input(s): "COLORU", "CLARITYU", "SPECGRAV", "PHUR", "PROTEINUA", "GLUCOSEU", "BILIRUBINCON", "BLOODU", "WBCU", "RBCU", "BACTERIA", "MUCUS" in the last 24 hours.    Microbiology Results (last 7 days)       ** No results found for the last 168 hours. **              Diagnostic Tests:        Assessment:    70 y/o male with ESRD on HD admitted with:     - GIB with severe anemia  - Paroxysmal atrial fibrillation with RVR on OACs  - DM-2 w/renal manifestations  - HTN  - Anemia of ckd  - 2nd. Hyperparathyroidism  - Hypoalbuminemia  - Hypophosphatemia  - PAD of right lower extremity   - Hx. Of prior CVA with R-hemiplegia  - Hx. Seizure disorder due to above  - HLD          Plan:    - Dialysis ASAP today and Q MWF  - UF as tolerated  - Transfuse during HD today and as needed  - Epogen as needed  - f/u H&H  - Renal ADA diet + protein supplements  - Oral PO4- binders  - OACs on HOLD  - GI consulted  - Glycemic control and other problems per admitting    "

## 2024-08-21 NOTE — NURSING
Pt AAOx4. NSR. Bp WNL. 100% on room air. Pt currently receiving 2nd unit of PRBC. Awaiting EGD and dialysis after the procedure. Pt has had 2 dark maroon bowel movements and 1 occurrence of dark red vomit. Daughter at bedside and updated on POC.

## 2024-08-21 NOTE — ASSESSMENT & PLAN NOTE
Anemia is likely due to acute blood loss which was from GI bleed and chronic disease due to ESRD. Most recent hemoglobin and hematocrit are listed below.  Recent Labs     08/21/24  0815   HGB 5.9*   HCT 18.2*     Plan  Will transfuse 2 units of blood.  Continue iron replacement.

## 2024-08-21 NOTE — Clinical Note
Bilateral: Groin.   Scrubbed with Chlorhexidine/Alcohol.    Hair: N/A.  Skin prep dry before draping.  Prepped by: Keyon Perry, RT 8/23/2024 5:03 PM.

## 2024-08-21 NOTE — ASSESSMENT & PLAN NOTE
Patient with Paroxysmal (<7 days) atrial fibrillation which is controlled. Patient is currently in sinus rhythm.[id number] Score: 3  Hold home Eliquis with possible active bleeding.

## 2024-08-21 NOTE — ASSESSMENT & PLAN NOTE
Patient has acute blood loss due to hemorrhage, the hemorrhage is due to gastrointestinal bleed, patient does have a propensity for bleeding due to a medication, the medication is Eliquis.. Will trend hemoglobin/hematocrit Every 8 hours, as well as monitor and correct for any coagulation defects. CBC and vital signs have been reviewed and last CBC was noted-   Lab Results   Component Value Date    WBC 7.54 08/21/2024    HGB 5.9 (LL) 08/21/2024    HCT 18.2 (LL) 08/21/2024    MCV 78 (L) 08/21/2024     08/21/2024   Patient has been started on GI bleed pathway orders.  GI consulted and patient has been started on Protonix drip.  Eliquis held.  Transfuse 2 units of blood and closely monitor CBC.  Possible EGD today.

## 2024-08-21 NOTE — SUBJECTIVE & OBJECTIVE
Past Medical History:   Diagnosis Date    Allergy     Clotting disorder     Elaquis and APlavix    Deep vein thrombosis     Diabetes mellitus, type 2     Hypertension     Nuclear sclerosis of both eyes 6/9/2017    Renal disorder     Seizures     Stroke     Urinary tract infection        Past Surgical History:   Procedure Laterality Date    CATARACT EXTRACTION W/  INTRAOCULAR LENS IMPLANT Right 10/05/2017    Dr. Tay    CATARACT EXTRACTION W/  INTRAOCULAR LENS IMPLANT Left 10/19/2017    Dr. Tay    CYSTOSCOPY W/ RETROGRADES Bilateral 2/18/2021    Procedure: CYSTOSCOPY, WITH RETROGRADE PYELOGRAM;  Surgeon: JEMMA Styles MD;  Location: [facility] OR;  Service: Urology;  Laterality: Bilateral;  REQUESTING EARLY AS POSSIBE-LO / 2/8/2021 @ 1:13PM  RN Pre Op 2-11-21, Covid NEGATIVE ON  2-17-21.  C A    ESOPHAGOGASTRODUODENOSCOPY N/A 8/17/2020    Procedure: EGD (ESOPHAGOGASTRODUODENOSCOPY);  Surgeon: Desmond Chapman MD;  Location: [facility];  Service: Endoscopy;  Laterality: N/A;    EYE SURGERY Bilateral     cataract    FEMORAL ARTERY STENT      FRACTURE SURGERY      INCISION AND DRAINAGE, LOWER EXTREMITY Right 4/4/2024    Procedure: INCISION AND DRAINAGE, LOWER EXTREMITY;  Surgeon: Jimbo Montilla III, MD;  Location: [facility] OR;  Service: Orthopedics;  Laterality: Right;    INCISION AND DRAINAGE, LOWER EXTREMITY Right 4/6/2024    Procedure: INCISION AND DRAINAGE, LOWER EXTREMITY;  Surgeon: Desmond Barillas MD;  Location: [facility] OR;  Service: Orthopedics;  Laterality: Right;  foot and ankle    INCISION AND DRAINAGE, LOWER EXTREMITY Right 4/9/2024    Procedure: INCISION AND DRAINAGE, LOWER EXTREMITY- FOOT & ANKLE;  Surgeon: Jimbo Montilla III, MD;  Location: [facility] OR;  Service: Orthopedics;  Laterality: Right;  CULTURES, VASCHE    INCISION AND DRAINAGE, LOWER EXTREMITY Right 5/21/2024    Procedure: INCISION AND DRAINAGE, LOWER EXTREMITY;  Surgeon: Jimbo Montilla III, MD;  Location: [facility] OR;  Service:  Orthopedics;  Laterality: Right;  Pulsavac and Vashe    KNEE SURGERY      bilateral scope    WOUND DRESSING Right 4/9/2024    Procedure: WOUND VAC EXCHANGE;  Surgeon: Jimbo Montilla III, MD;  Location: [facility];  Service: Orthopedics;  Laterality: Right;       Review of patient's allergies indicates:   Allergen Reactions    Ace inhibitors      Hyperkalemia 8/2018  Other reaction(s): Unknown    Penicillins Hives     Tolerated cefepime and cefdinir previously    Tizanidine      Felt hot       No current facility-administered medications on file prior to encounter.     Current Outpatient Medications on File Prior to Encounter   Medication Sig    atorvastatin (LIPITOR) 80 MG tablet Take 1 tablet (80 mg total) by mouth once daily.    ferrous gluconate (FERGON) 324 MG tablet Take 1 tablet (324 mg total) by mouth daily with breakfast.    finasteride (PROSCAR) 5 mg tablet Take 1 tablet (5 mg total) by mouth once daily.    oxyCODONE-acetaminophen (PERCOCET)  mg per tablet Take 1 tablet by mouth every 24 hours as needed for Pain. Medically necessary more than 7 days    RENVELA 800 mg Tab Take 1 tablet (800 mg total) by mouth 3 (three) times daily with meals.    tamsulosin (FLOMAX) 0.4 mg Cap Take 2 capsules (0.8 mg total) by mouth once daily.    vitamin renal formula, B-complex-vitamin c-folic acid, (NEPHROCAP) 1 mg Cap Take 1 capsule by mouth once daily.    [DISCONTINUED] apixaban (ELIQUIS) 5 mg Tab Take 1 tablet (5 mg total) by mouth 2 (two) times daily.    [DISCONTINUED] aspirin (ECOTRIN) 81 MG EC tablet Take 1 tablet (81 mg total) by mouth once daily.     Family History       Problem Relation (Age of Onset)    Cataracts Mother    Diabetes Mother    Heart disease Mother    Hypertension Mother, Sister    No Known Problems Father, Brother, Maternal Aunt, Maternal Uncle, Paternal Aunt, Paternal Uncle, Maternal Grandmother, Maternal Grandfather, Paternal Grandmother, Paternal Grandfather, Daughter, Other           Tobacco Use    Smoking status: Never    Smokeless tobacco: Never   Substance and Sexual Activity    Alcohol use: No    Drug use: No    Sexual activity: Not on file     Review of Systems   Constitutional:  Negative for chills and fever.   HENT:  Negative for ear discharge and ear pain.    Eyes:  Negative for discharge and itching.   Respiratory:  Negative for cough and shortness of breath.    Cardiovascular:  Negative for chest pain and palpitations.   Gastrointestinal:  Positive for abdominal pain and vomiting.   Endocrine: Negative for cold intolerance and heat intolerance.   Genitourinary:  Negative for frequency and hematuria.   Musculoskeletal:  Negative for neck pain and neck stiffness.   Skin:  Positive for wound. Negative for rash.   Neurological:  Negative for seizures and syncope.   Psychiatric/Behavioral:  Negative for agitation and hallucinations.      Objective:     Vital Signs (Most Recent):  Temp: 97.3 °F (36.3 °C) (08/21/24 1015)  Pulse: (!) 116 (08/21/24 1300)  Resp: (!) 6 (08/21/24 1300)  BP: (!) 140/63 (08/21/24 1300)  SpO2: 100 % (08/21/24 1300) Vital Signs (24h Range):  Temp:  [97.3 °F (36.3 °C)-98.1 °F (36.7 °C)] 97.3 °F (36.3 °C)  Pulse:  [] 116  Resp:  [6-24] 6  SpO2:  [97 %-100 %] 100 %  BP: (106-147)/(54-76) 140/63     Weight: 72.6 kg (160 lb)  Body mass index is 24.33 kg/m².     Physical Exam  Constitutional:       Appearance: He is ill-appearing. He is not diaphoretic.   HENT:      Head: Normocephalic and atraumatic.      Mouth/Throat:      Pharynx: Oropharynx is clear. No oropharyngeal exudate or posterior oropharyngeal erythema.   Cardiovascular:      Rate and Rhythm: Regular rhythm. Tachycardia present.   Pulmonary:      Effort: No respiratory distress.      Breath sounds: Normal breath sounds.   Abdominal:      General: Bowel sounds are normal.      Palpations: Abdomen is soft.   Musculoskeletal:      Right lower leg: No edema.      Left lower leg: No edema.   Skin:      Comments: Bilateral feet dressed   Neurological:      General: No focal deficit present.      Cranial Nerves: No cranial nerve deficit.                Significant Labs: All pertinent labs within the past 24 hours have been reviewed.  BMP:   Recent Labs   Lab 08/21/24  0815   *      K 4.1      CO2 25   BUN 86*   CREATININE 8.2*   CALCIUM 8.2*   MG 2.0     CBC:   Recent Labs   Lab 08/21/24  0815   WBC 7.54   HGB 5.9*   HCT 18.2*          Significant Imaging: I have reviewed all pertinent imaging results/findings within the past 24 hours.

## 2024-08-21 NOTE — HPI
68 y/o male with a PMHx of DVT, PAF on Eliquis, DMT2, HTN, seizures, stroke, presents to the ER for evaluation of coffee ground emesis and darks stool that started early this morning.  Patient reports 2 episodes of coffee ground emesis and 1 episode of dark stool.  Also reports associated abdominal pain which has now resolved.  No alleviating or aggravating factors.  Patient has not taken his Eliquis today.  He presented to ER where he was noted to be tachycardic.  Labs showing Hgb much lower than baseline.  He denies any fever or chills.  No other complaints.

## 2024-08-22 PROBLEM — T07.XXXA WOUNDS, MULTIPLE: Chronic | Status: ACTIVE | Noted: 2024-08-22

## 2024-08-22 LAB
ANION GAP SERPL CALC-SCNC: 11 MMOL/L (ref 8–16)
BASOPHILS # BLD AUTO: 0.02 K/UL (ref 0–0.2)
BASOPHILS # BLD AUTO: 0.03 K/UL (ref 0–0.2)
BASOPHILS # BLD AUTO: 0.03 K/UL (ref 0–0.2)
BASOPHILS NFR BLD: 0.3 % (ref 0–1.9)
BASOPHILS NFR BLD: 0.4 % (ref 0–1.9)
BASOPHILS NFR BLD: 0.5 % (ref 0–1.9)
BUN SERPL-MCNC: 51 MG/DL (ref 8–23)
CALCIUM SERPL-MCNC: 8.1 MG/DL (ref 8.7–10.5)
CHLORIDE SERPL-SCNC: 99 MMOL/L (ref 95–110)
CO2 SERPL-SCNC: 28 MMOL/L (ref 23–29)
CREAT SERPL-MCNC: 5.6 MG/DL (ref 0.5–1.4)
DIFFERENTIAL METHOD BLD: ABNORMAL
EOSINOPHIL # BLD AUTO: 0 K/UL (ref 0–0.5)
EOSINOPHIL # BLD AUTO: 0.1 K/UL (ref 0–0.5)
EOSINOPHIL # BLD AUTO: 0.1 K/UL (ref 0–0.5)
EOSINOPHIL NFR BLD: 0.3 % (ref 0–8)
EOSINOPHIL NFR BLD: 1.1 % (ref 0–8)
EOSINOPHIL NFR BLD: 1.3 % (ref 0–8)
ERYTHROCYTE [DISTWIDTH] IN BLOOD BY AUTOMATED COUNT: 17.4 % (ref 11.5–14.5)
ERYTHROCYTE [DISTWIDTH] IN BLOOD BY AUTOMATED COUNT: 17.7 % (ref 11.5–14.5)
ERYTHROCYTE [DISTWIDTH] IN BLOOD BY AUTOMATED COUNT: 18 % (ref 11.5–14.5)
EST. GFR  (NO RACE VARIABLE): 10 ML/MIN/1.73 M^2
GLUCOSE SERPL-MCNC: 125 MG/DL (ref 70–110)
HCT VFR BLD AUTO: 21.3 % (ref 40–54)
HCT VFR BLD AUTO: 21.7 % (ref 40–54)
HCT VFR BLD AUTO: 23.6 % (ref 40–54)
HGB BLD-MCNC: 7.2 G/DL (ref 14–18)
HGB BLD-MCNC: 7.5 G/DL (ref 14–18)
HGB BLD-MCNC: 7.8 G/DL (ref 14–18)
IMM GRANULOCYTES # BLD AUTO: 0.02 K/UL (ref 0–0.04)
IMM GRANULOCYTES # BLD AUTO: 0.02 K/UL (ref 0–0.04)
IMM GRANULOCYTES # BLD AUTO: 0.03 K/UL (ref 0–0.04)
IMM GRANULOCYTES NFR BLD AUTO: 0.3 % (ref 0–0.5)
IMM GRANULOCYTES NFR BLD AUTO: 0.3 % (ref 0–0.5)
IMM GRANULOCYTES NFR BLD AUTO: 0.4 % (ref 0–0.5)
LYMPHOCYTES # BLD AUTO: 1 K/UL (ref 1–4.8)
LYMPHOCYTES # BLD AUTO: 1.6 K/UL (ref 1–4.8)
LYMPHOCYTES # BLD AUTO: 1.7 K/UL (ref 1–4.8)
LYMPHOCYTES NFR BLD: 15.3 % (ref 18–48)
LYMPHOCYTES NFR BLD: 23.5 % (ref 18–48)
LYMPHOCYTES NFR BLD: 25.3 % (ref 18–48)
MCH RBC QN AUTO: 27.2 PG (ref 27–31)
MCH RBC QN AUTO: 28 PG (ref 27–31)
MCH RBC QN AUTO: 28.1 PG (ref 27–31)
MCHC RBC AUTO-ENTMCNC: 33.1 G/DL (ref 32–36)
MCHC RBC AUTO-ENTMCNC: 33.8 G/DL (ref 32–36)
MCHC RBC AUTO-ENTMCNC: 34.6 G/DL (ref 32–36)
MCV RBC AUTO: 81 FL (ref 82–98)
MCV RBC AUTO: 82 FL (ref 82–98)
MCV RBC AUTO: 83 FL (ref 82–98)
MONOCYTES # BLD AUTO: 0.5 K/UL (ref 0.3–1)
MONOCYTES # BLD AUTO: 0.5 K/UL (ref 0.3–1)
MONOCYTES # BLD AUTO: 0.8 K/UL (ref 0.3–1)
MONOCYTES NFR BLD: 11.5 % (ref 4–15)
MONOCYTES NFR BLD: 7.6 % (ref 4–15)
MONOCYTES NFR BLD: 8.1 % (ref 4–15)
NEUTROPHILS # BLD AUTO: 4 K/UL (ref 1.8–7.7)
NEUTROPHILS # BLD AUTO: 4.4 K/UL (ref 1.8–7.7)
NEUTROPHILS # BLD AUTO: 4.9 K/UL (ref 1.8–7.7)
NEUTROPHILS NFR BLD: 62.9 % (ref 38–73)
NEUTROPHILS NFR BLD: 65.4 % (ref 38–73)
NEUTROPHILS NFR BLD: 75.5 % (ref 38–73)
NRBC BLD-RTO: 0 /100 WBC
PLATELET # BLD AUTO: 149 K/UL (ref 150–450)
PLATELET # BLD AUTO: 163 K/UL (ref 150–450)
PLATELET # BLD AUTO: 176 K/UL (ref 150–450)
PMV BLD AUTO: 9.2 FL (ref 9.2–12.9)
PMV BLD AUTO: 9.4 FL (ref 9.2–12.9)
PMV BLD AUTO: 9.8 FL (ref 9.2–12.9)
POCT GLUCOSE: 145 MG/DL (ref 70–110)
POTASSIUM SERPL-SCNC: 4 MMOL/L (ref 3.5–5.1)
RBC # BLD AUTO: 2.56 M/UL (ref 4.6–6.2)
RBC # BLD AUTO: 2.68 M/UL (ref 4.6–6.2)
RBC # BLD AUTO: 2.87 M/UL (ref 4.6–6.2)
SODIUM SERPL-SCNC: 138 MMOL/L (ref 136–145)
WBC # BLD AUTO: 6.17 K/UL (ref 3.9–12.7)
WBC # BLD AUTO: 6.45 K/UL (ref 3.9–12.7)
WBC # BLD AUTO: 7.05 K/UL (ref 3.9–12.7)

## 2024-08-22 PROCEDURE — 25000003 PHARM REV CODE 250: Performed by: HOSPITALIST

## 2024-08-22 PROCEDURE — 80048 BASIC METABOLIC PNL TOTAL CA: CPT | Performed by: HOSPITALIST

## 2024-08-22 PROCEDURE — 85025 COMPLETE CBC W/AUTO DIFF WBC: CPT | Mod: 91 | Performed by: HOSPITALIST

## 2024-08-22 PROCEDURE — 63600175 PHARM REV CODE 636 W HCPCS: Performed by: NURSE PRACTITIONER

## 2024-08-22 PROCEDURE — 36415 COLL VENOUS BLD VENIPUNCTURE: CPT | Performed by: HOSPITALIST

## 2024-08-22 PROCEDURE — 99232 SBSQ HOSP IP/OBS MODERATE 35: CPT | Mod: ,,, | Performed by: NURSE PRACTITIONER

## 2024-08-22 PROCEDURE — 25000003 PHARM REV CODE 250: Performed by: NURSE PRACTITIONER

## 2024-08-22 PROCEDURE — 11000001 HC ACUTE MED/SURG PRIVATE ROOM

## 2024-08-22 PROCEDURE — 63600175 PHARM REV CODE 636 W HCPCS: Performed by: HOSPITALIST

## 2024-08-22 RX ADMIN — PANTOPRAZOLE SODIUM 8 MG/HR: 40 INJECTION, POWDER, FOR SOLUTION INTRAVENOUS at 04:08

## 2024-08-22 RX ADMIN — TAMSULOSIN HYDROCHLORIDE 0.8 MG: 0.4 CAPSULE ORAL at 09:08

## 2024-08-22 RX ADMIN — MUPIROCIN: 20 OINTMENT TOPICAL at 09:08

## 2024-08-22 RX ADMIN — Medication 1 CAPSULE: at 09:08

## 2024-08-22 RX ADMIN — PANTOPRAZOLE SODIUM 8 MG/HR: 40 INJECTION, POWDER, FOR SOLUTION INTRAVENOUS at 06:08

## 2024-08-22 RX ADMIN — ATORVASTATIN CALCIUM 80 MG: 40 TABLET, FILM COATED ORAL at 09:08

## 2024-08-22 RX ADMIN — SEVELAMER CARBONATE 800 MG: 800 TABLET, FILM COATED ORAL at 11:08

## 2024-08-22 RX ADMIN — PANTOPRAZOLE SODIUM 8 MG/HR: 40 INJECTION, POWDER, FOR SOLUTION INTRAVENOUS at 11:08

## 2024-08-22 RX ADMIN — PANTOPRAZOLE SODIUM 8 MG/HR: 40 INJECTION, POWDER, FOR SOLUTION INTRAVENOUS at 01:08

## 2024-08-22 RX ADMIN — SEVELAMER CARBONATE 800 MG: 800 TABLET, FILM COATED ORAL at 04:08

## 2024-08-22 RX ADMIN — FINASTERIDE 5 MG: 5 TABLET, FILM COATED ORAL at 09:08

## 2024-08-22 NOTE — HOSPITAL COURSE
70 y/o male with a PMHx of DVT, PAF on Eliquis, DMT2, HTN, ESRD, stroke admitted with GI bleed.  Hgb of 5.9, tachycardic and with active bleeding and admitted to ICU.  Started on Protonix drip.  GI consulted.  Nephrology consulted for HD.  Patient was taken for EGD that showed gastric ulcer with oozing hemorrhage, treated with a clip.  Patient was transfused 3 units of blood with good correction of H/H.  Eliquis held.  Will need to continue IV Protonix for 72 hours.until tomorrow.  Wound Care consulted for chronic LE wounds. H/H dropped and he was transfused pRBC. CTA abdomen/pelvis with no active hemorrhage but there is mucosal enhancement of gastric fundus. IR consulted and did angiogram that did not erveal active contrast extravasation but due to ongoing bleeding, elective coil embolization of left gastric artery was done on 8/23/24. Hb stabilized  and he was cleared to be restarted on eliquis. Still with ongoing dark tarry stool but Hb stable. Started on carafate.  No further dark tarry stools and hemoglobin remained stable with no further bleeding.  Patient was discharged home in stable condition.

## 2024-08-22 NOTE — ASSESSMENT & PLAN NOTE
Patient with Paroxysmal (<7 days) atrial fibrillation which is controlled. Patient is currently in sinus rhythm.[id number] Score: 3  Hold home Eliquis with GI bleed.

## 2024-08-22 NOTE — NURSING
Patient has arrived on the unit via bed, awake and alert, on room air. Patient currently having a BM

## 2024-08-22 NOTE — PROGRESS NOTES
[facility] Medicine  Progress Note    Patient Name: Miguel Moore  MRN: [id number]  Patient Class: IP- Inpatient   Admission Date: 8/21/2024  Length of Stay: 1 days  Attending Physician: Barrie Negro MD  Primary Care Provider: Raciel Raymond MD        Subjective:     Principal Problem:GI bleed        HPI:  70 y/o male with a PMHx of DVT, PAF on Eliquis, DMT2, HTN, seizures, stroke, presents to the ER for evaluation of coffee ground emesis and darks stool that started early this morning.  Patient reports 2 episodes of coffee ground emesis and 1 episode of dark stool.  Also reports associated abdominal pain which has now resolved.  No alleviating or aggravating factors.  Patient has not taken his Eliquis today.  He presented to ER where he was noted to be tachycardic.  Labs showing Hgb much lower than baseline.  He denies any fever or chills.  No other complaints.    Overview/Hospital Course:  70 y/o male with a PMHx of DVT, PAF on Eliquis, DMT2, HTN, ESRD, stroke admitted with GI bleed.  Hgb of 5.9, tachycardic and with active bleeding and admitted to ICU.  Started on Protonix drip.  GI consulted.  Nephrology consulted for HD.  Patient was taken for EGD that showed gastric ulcer with oozing hemorrhage, treated with a clip.  Patient was transfused 3 units of blood with good correction of H/H.  Eliquis held.  Will need to continue IV Protonix for 72 hours.  Wound Care consulted for chronic LE wounds.    Interval History: feeling better after having something to drink this morning.    Review of Systems   HENT:  Negative for ear discharge and ear pain.    Eyes:  Negative for discharge and itching.   Endocrine: Negative for cold intolerance and heat intolerance.   Neurological:  Negative for seizures and syncope.     Objective:     Vital Signs (Most Recent):  Temp: 97.6 °F (36.4 °C) (08/22/24 0701)  Pulse: 79 (08/22/24 0930)  Resp: (!) 23 (08/22/24 0930)  BP: (!) 120/59 (08/22/24  0930)  SpO2: 100 % (08/22/24 0930) Vital Signs (24h Range):  Temp:  [97.3 °F (36.3 °C)-98.6 °F (37 °C)] 97.6 °F (36.4 °C)  Pulse:  [] 79  Resp:  [5-24] 23  SpO2:  [95 %-100 %] 100 %  BP: (106-158)/(50-70) 120/59     Weight: 72.6 kg (160 lb)  Body mass index is 24.33 kg/m².    Intake/Output Summary (Last 24 hours) at 8/22/2024 0944  Last data filed at 8/22/2024 0900  Gross per 24 hour   Intake 1294.66 ml   Output 1500 ml   Net -205.34 ml         Physical Exam  Constitutional:       Appearance: He is not toxic-appearing or diaphoretic.   HENT:      Head: Normocephalic and atraumatic.      Mouth/Throat:      Pharynx: Oropharynx is clear. No oropharyngeal exudate or posterior oropharyngeal erythema.   Cardiovascular:      Rate and Rhythm: Normal rate and regular rhythm.   Pulmonary:      Effort: No respiratory distress.      Breath sounds: Normal breath sounds.   Abdominal:      General: Bowel sounds are normal.      Palpations: Abdomen is soft.   Skin:     Comments: Dressing to both feet   Neurological:      Mental Status: He is oriented to person, place, and time.      Cranial Nerves: No cranial nerve deficit.             Significant Labs: All pertinent labs within the past 24 hours have been reviewed.  BMP:   Recent Labs   Lab 08/21/24  0815   *      K 4.1      CO2 25   BUN 86*   CREATININE 8.2*   CALCIUM 8.2*   MG 2.0     CBC:   Recent Labs   Lab 08/21/24  1256 08/21/24  2247 08/22/24  0346   WBC 7.50 7.24 6.45   HGB 5.0* 8.6* 7.8*   HCT 14.9* 24.4* 23.6*    188 163       Significant Imaging: I have reviewed all pertinent imaging results/findings within the past 24 hours.    Assessment/Plan:      * GI bleed  Patient has acute blood loss due to hemorrhage, the hemorrhage is due to gastrointestinal bleed, patient does have a propensity for bleeding due to a medication, the medication is Eliquis.. Will trend hemoglobin/hematocrit Every 8 hours, as well as monitor and correct for any  "coagulation defects. CBC and vital signs have been reviewed and last CBC was noted-   Lab Results   Component Value Date    WBC 6.45 08/22/2024    HGB 7.8 (L) 08/22/2024    HCT 23.6 (L) 08/22/2024    MCV 82 08/22/2024     08/22/2024   Patient has been started on GI bleed pathway orders.  GI consulted and patient has been started on Protonix drip.  Eliquis held.  Transfused 3 units of blood with adequate correction of H/H.  EGD showing gastric ulcer with oozing hemorrhage.  Treated with clip.  Continue IV Protonix for 72 hours.  Continue to monitor H/H and holding Eliquis.  Started on clear liquid diet.  If further bleeding, IR consult for targeted embolization.    Wounds, multiple  Stage 4 right plantar foot wound POA  Stage 4 right mid plantar foot wound POA  Unstageable right lateral heel wound POA  Unstageable left medial heel wound POA      Paroxysmal atrial fibrillation  Patient with Paroxysmal (<7 days) atrial fibrillation which is controlled. Patient is currently in sinus rhythm.[id number] Score: 3  Hold home Eliquis with GI bleed.    Anemia in chronic kidney disease  Anemia is likely due to acute blood loss which was from GI bleed and chronic disease due to ESRD. Most recent hemoglobin and hematocrit are listed below.  Recent Labs     08/21/24  1256 08/21/24  2247 08/22/24  0346   HGB 5.0* 8.6* 7.8*   HCT 14.9* 24.4* 23.6*       Plan  Transfused 3 units of blood with adequate correction of H/H.  Continue iron replacement.    Chronic deep vein thrombosis (DVT) of popliteal vein of right lower extremity 9/21/20 outside [place]; unprovoked; anticoagulation  Hold Eliquis with GI bleed.      Type 2 diabetes mellitus with diabetic neuropathy, with long-term current use of insulin  Patient's FSGs are controlled on current medication regimen.  Last A1c reviewed-   Lab Results   Component Value Date    HGBA1C 5.2 06/24/2024     Most recent fingerstick glucose reviewed- No results for input(s): "POCTGLUCOSE" in the " last 24 hours.  Current correctional scale  Medium  Maintain anti-hyperglycemic dose as follows-   Antihyperglycemics (From admission, onward)      None          Hold Oral hypoglycemics while patient is in the hospital.    ESRD (end stage renal disease)  Creatine stable for now. BMP reviewed- noted Estimated Creatinine Clearance: 8.1 mL/min (A) (based on SCr of 8.2 mg/dL (H)). according to latest data. Based on current GFR, CKD stage is end stage.  Monitor UOP and serial BMP and adjust therapy as needed. Renally dose meds. Avoid nephrotoxic medications and procedures.  Nephrology consulted for HD.    Hemiplegia and hemiparesis following cerebral infarction affecting right dominant side  Stable       BPH (benign prostatic hyperplasia) 2/18/21 prostate bx normal  Continue home medications.      Dyslipidemia  Continue statin      Benign essential HTN  Chronic, controlled. Latest blood pressure and vitals reviewed-     Temp:  [97.3 °F (36.3 °C)-98.6 °F (37 °C)]   Pulse:  []   Resp:  [5-24]   BP: (106-158)/(50-70)   SpO2:  [95 %-100 %] .   No Bp medications on home med rec.  Low Na diet once advanced.  Will utilize p.r.n. blood pressure medication only if patient's blood pressure greater than 180/110 and he develops symptoms such as worsening chest pain or shortness of breath.      VTE Risk Mitigation (From admission, onward)           Ordered     IP VTE HIGH RISK PATIENT  Once         08/21/24 0957     Place sequential compression device  Until discontinued         08/21/24 0957                    Discharge Planning   GARY:      Code Status: Full Code   Is the patient medically ready for discharge?:     Reason for patient still in hospital (select all that apply): Patient trending condition and Treatment             Barrie Negro MD  Department of Hospital Medicine   [facility] [place] - Intensive Care

## 2024-08-22 NOTE — PROGRESS NOTES
[facility] [place]  Nurses Note -- 4 Eyes      8/22/2024       Skin assessed on: Q Shift      [] No Pressure Injuries Present    [x]Prevention Measures Documented    [x] Yes LDA  for Pressure Injury Previously documented     [] Yes New Pressure Injury Discovered   [] LDA for New Pressure Injury Added      Attending RN:  Yolanda Patel RN     Second RN:  Teresa RN

## 2024-08-22 NOTE — PROVATION PATIENT INSTRUCTIONS
Discharge Summary/Instructions after an Endoscopic Procedure  Patient Name: Miguel Moore  Patient MRN: [id number]  Patient YOB: 1955  Wednesday, August 21, 2024  Tonie Chauhan MD  Dear patient,  As a result of recent federal legislation (The Federal Cures Act), you may   receive lab or pathology results from your procedure in your [facility]   account before your physician is able to contact you. Your physician or   their representative will relay the results to you with their   recommendations at their soonest availability.  Thank you,  RESTRICTIONS:  During your procedure today, you received medications for sedation.  These   medications may affect your judgment, balance and coordination.  Therefore,   for 24 hours, you have the following restrictions:   - DO NOT drive a car, operate machinery, make legal/financial decisions,   sign important papers or drink alcohol.    ACTIVITY:  Today: no heavy lifting, straining or running due to procedural   sedation/anesthesia.  The following day: return to full activity including work.  DIET:  Eat and drink normally unless instructed otherwise.     TREATMENT FOR COMMON SIDE EFFECTS:  - Mild abdominal pain, nausea, belching, bloating or excessive gas:  rest,   eat lightly and use a heating pad.  - Sore Throat: treat with throat lozenges and/or gargle with warm salt   water.  - Because air was used during the procedure, expelling large amounts of air   from your rectum or belching is normal.  - If a bowel prep was taken, you may not have a bowel movement for 1-3 days.    This is normal.  SYMPTOMS TO WATCH FOR AND REPORT TO YOUR PHYSICIAN:  1. Abdominal pain or bloating, other than gas cramps.  2. Chest pain.  3. Back pain.  4. Signs of infection such as: chills or fever occurring within 24 hours   after the procedure.  5. Rectal bleeding, which would show as bright red, maroon, or black stools.   (A tablespoon of blood from the rectum is not serious,  especially if   hemorrhoids are present.)  6. Vomiting.  7. Weakness or dizziness.  GO DIRECTLY TO THE NEAREST EMERGENCY ROOM IF YOU HAVE ANY OF THE FOLLOWING:      Difficulty breathing              Chills and/or fever over 101 F   Persistent vomiting and/or vomiting blood   Severe abdominal pain   Severe chest pain   Black, tarry stools   Bleeding- more than one tablespoon   Any other symptom or condition that you feel may need urgent attention  Your doctor recommends these additional instructions:  If any biopsies were taken, your doctors clinic will contact you in 1 to 2   weeks with any results.  - Return patient to the hospital [facility] for ongoing care.   - NPO.  - Continue present medications. Continue high dose Protonix IV for 72 hours.    Then PPI BID for 12 weeks.  - If hematemesis or significant rebleeding occurs then I recommend IR   consult for targeted embolization to the hemoclip placed at the GE   junction.  - Repeat EGD in 8-12 weeks to ensure healing of his ulcer.  For questions, problems or results please call your physician - Tonie Chauhan MD at Work:  [phone].  [facility] can be reached at [phone]     IF A COMPLICATION OR EMERGENCY SITUATION ARISES AND YOU ARE UNABLE TO REACH   YOUR PHYSICIAN - GO DIRECTLY TO THE EMERGENCY ROOM.  Tonie Chauhan MD  8/21/2024 8:08:22 PM  This report has been verified and signed electronically.  Dear patient,  As a result of recent federal legislation (The Federal Cures Act), you may   receive lab or pathology results from your procedure in your [facility]   account before your physician is able to contact you. Your physician or   their representative will relay the results to you with their   recommendations at their soonest availability.  Thank you,  PROVATION

## 2024-08-22 NOTE — PLAN OF CARE
Problem: Adult Inpatient Plan of Care  Goal: Plan of Care Review  Outcome: Progressing    Patient is awake alert oriented. Needs assistance in bed movement and turning. Dressing to bilateral lower extremities clean dry and intact. S/P EGD yesterday and 1 ulcer was noted clipped and prayed. No Bloody stool or vomiting was noted during the shift. Still on protonix drip. Vital signs stable and within limits

## 2024-08-22 NOTE — PROVIDER TRANSFER
Transfer Note    70 y/o male with a PMHx of DVT, PAF on Eliquis, DMT2, HTN, ESRD, stroke admitted with GI bleed. Hgb of 5.9, tachycardic and with active bleeding and admitted to ICU. Started on Protonix drip. GI consulted. Nephrology consulted for HD. Patient was taken for EGD that showed gastric ulcer with oozing hemorrhage, treated with a clip. Patient was transfused 3 units of blood with good correction of H/H. Eliquis held. Will need to continue IV Protonix for 72 hours. Wound Care consulted for chronic LE wounds.  H/H relatively stable post transfusion and hemodynamically stable.  May need another unit if Hgb drop below 7.  Home on 8/24 after 72 hours of IV Protonix if doing well and not further bleeding.  If any further bleeding, then consult IR for targeted embolization.

## 2024-08-22 NOTE — PLAN OF CARE
Case Management Assessment     PCP: Raciel Raymond MD   Pharmacy:   [facility] - [place] - [address]  [address]  [place] [address]  Phone: [phone] Fax: [phone]    [id number] - [place] - [address]  [address]  [place] [address]  Phone: [phone] Fax: [phone]    [facility] [place]  [address]  [address]   [facility] [place] [facility] [place] [place] [address]  Phone: [phone] Fax: [phone]       Patient Arrived From: home  Existing Help at Home: DaughterMaddy    Barriers to Discharge: none    Discharge Plan:    A. Home health   B. home      Patient stated that he lives with his daughter who helps him at home.  She will help him to get home at time of DC.  This patient has been screened for Case Management needs.  Treatment is ongoing in the ICU at this time.  CM contact information given to patient/family.  CM will continue to follow while in the ICU and assist with DC planning as needed.          08/22/24 1518   Discharge Assessment   Assessment Type Discharge Planning Assessment   Confirmed/corrected address, phone number and insurance Yes   Confirmed Demographics Correct on Facesheet   Source of Information patient   Communicated GARY with patient/caregiver Yes   People in Home child(hannah), adult   Do you expect to return to your current living situation? Yes   Do you have help at home or someone to help you manage your care at home? Yes   Prior to hospitilization cognitive status: Alert/Oriented   Current cognitive status: Alert/Oriented   Walking or Climbing Stairs Difficulty yes   Walking or Climbing Stairs ambulation difficulty, requires equipment   Dressing/Bathing Difficulty yes   Dressing/Bathing bathing difficulty, assistance 1 person   Home Layout Able to live on 1st floor   Equipment Currently Used at Home cane, straight   Readmission within 30 days? No   Patient currently being followed by outpatient case management? No   Do you  currently have service(s) that help you manage your care at home? No   Do you take prescription medications? Yes   Do you have prescription coverage? Yes   Do you have any problems affording any of your prescribed medications? No   Is the patient taking medications as prescribed? yes   How do you get to doctors appointments? family or friend will provide   Are you on dialysis? Yes   Dialysis Name and Scheduled days [facility]   Do you take coumadin? No   Discharge Plan A Home Health   Discharge Plan B Home   DME Needed Upon Discharge  none   Discharge Plan discussed with: Patient   Transition of Care Barriers None

## 2024-08-22 NOTE — ASSESSMENT & PLAN NOTE
Patient has acute blood loss due to hemorrhage, the hemorrhage is due to gastrointestinal bleed, patient does have a propensity for bleeding due to a medication, the medication is Eliquis.. Will trend hemoglobin/hematocrit Every 8 hours, as well as monitor and correct for any coagulation defects. CBC and vital signs have been reviewed and last CBC was noted-   Lab Results   Component Value Date    WBC 6.45 08/22/2024    HGB 7.8 (L) 08/22/2024    HCT 23.6 (L) 08/22/2024    MCV 82 08/22/2024     08/22/2024   Patient has been started on GI bleed pathway orders.  GI consulted and patient has been started on Protonix drip.  Eliquis held.  Transfused 3 units of blood with adequate correction of H/H.  EGD showing gastric ulcer with oozing hemorrhage.  Treated with clip.  Continue IV Protonix for 72 hours.  Continue to monitor H/H and holding Eliquis.  Started on clear liquid diet.  If further bleeding, IR consult for targeted embolization.

## 2024-08-22 NOTE — SUBJECTIVE & OBJECTIVE
Interval History: feeling better after having something to drink this morning.    Review of Systems   HENT:  Negative for ear discharge and ear pain.    Eyes:  Negative for discharge and itching.   Endocrine: Negative for cold intolerance and heat intolerance.   Neurological:  Negative for seizures and syncope.     Objective:     Vital Signs (Most Recent):  Temp: 97.6 °F (36.4 °C) (08/22/24 0701)  Pulse: 79 (08/22/24 0930)  Resp: (!) 23 (08/22/24 0930)  BP: (!) 120/59 (08/22/24 0930)  SpO2: 100 % (08/22/24 0930) Vital Signs (24h Range):  Temp:  [97.3 °F (36.3 °C)-98.6 °F (37 °C)] 97.6 °F (36.4 °C)  Pulse:  [] 79  Resp:  [5-24] 23  SpO2:  [95 %-100 %] 100 %  BP: (106-158)/(50-70) 120/59     Weight: 72.6 kg (160 lb)  Body mass index is 24.33 kg/m².    Intake/Output Summary (Last 24 hours) at 8/22/2024 0944  Last data filed at 8/22/2024 0900  Gross per 24 hour   Intake 1294.66 ml   Output 1500 ml   Net -205.34 ml         Physical Exam  Constitutional:       Appearance: He is not toxic-appearing or diaphoretic.   HENT:      Head: Normocephalic and atraumatic.      Mouth/Throat:      Pharynx: Oropharynx is clear. No oropharyngeal exudate or posterior oropharyngeal erythema.   Cardiovascular:      Rate and Rhythm: Normal rate and regular rhythm.   Pulmonary:      Effort: No respiratory distress.      Breath sounds: Normal breath sounds.   Abdominal:      General: Bowel sounds are normal.      Palpations: Abdomen is soft.   Skin:     Comments: Dressing to both feet   Neurological:      Mental Status: He is oriented to person, place, and time.      Cranial Nerves: No cranial nerve deficit.             Significant Labs: All pertinent labs within the past 24 hours have been reviewed.  BMP:   Recent Labs   Lab 08/21/24  0815   *      K 4.1      CO2 25   BUN 86*   CREATININE 8.2*   CALCIUM 8.2*   MG 2.0     CBC:   Recent Labs   Lab 08/21/24  1256 08/21/24  2247 08/22/24  0346   WBC 7.50 7.24 6.45   HGB  5.0* 8.6* 7.8*   HCT 14.9* 24.4* 23.6*    188 163       Significant Imaging: I have reviewed all pertinent imaging results/findings within the past 24 hours.

## 2024-08-22 NOTE — ASSESSMENT & PLAN NOTE
Anemia is likely due to acute blood loss which was from GI bleed and chronic disease due to ESRD. Most recent hemoglobin and hematocrit are listed below.  Recent Labs     08/21/24  1256 08/21/24  2247 08/22/24  0346   HGB 5.0* 8.6* 7.8*   HCT 14.9* 24.4* 23.6*       Plan  Transfused 3 units of blood with adequate correction of H/H.  Continue iron replacement.

## 2024-08-22 NOTE — NURSING
[facility]  Nurses Note -- 4 Eyes      8/22/2024       Skin assessed on: Transfer      [] No Pressure Injuries Present    []Prevention Measures Documented    [x] Yes LDA  for Pressure Injury Previously documented     [] Yes New Pressure Injury Discovered   [] LDA for New Pressure Injury Added      Attending RN:  Vickie Harrington LPN     Second RN:  Yolanda Patel

## 2024-08-22 NOTE — ASSESSMENT & PLAN NOTE
Chronic, controlled. Latest blood pressure and vitals reviewed-     Temp:  [97.3 °F (36.3 °C)-98.6 °F (37 °C)]   Pulse:  []   Resp:  [5-24]   BP: (106-158)/(50-70)   SpO2:  [95 %-100 %] .   No Bp medications on home med rec.  Low Na diet once advanced.  Will utilize p.r.n. blood pressure medication only if patient's blood pressure greater than 180/110 and he develops symptoms such as worsening chest pain or shortness of breath.

## 2024-08-22 NOTE — ANESTHESIA PROCEDURE NOTES
Intubation    Date/Time: 8/21/2024 7:11 PM    Performed by: Dipak Alonzo CRNA  Authorized by: Deborah Barrera MD    Intubation:     Induction:  Rapid sequence induction    Intubated:  Postinduction    Mask Ventilation:  Not attempted    Attempts:  1    Attempted By:  CRNA    Method of Intubation:  Video laryngoscopy    Blade:  Garland 3    Laryngeal View Grade: Grade I - full view of cords      Difficult Airway Encountered?: No      Complications:  None    Airway Device:  Oral endotracheal tube    Airway Device Size:  7.5    Style/Cuff Inflation:  Cuffed (inflated to minimal occlusive pressure)    Tube secured:  22    Placement Verified By:  Capnometry    Complicating Factors:  None    Findings Post-Intubation:  BS equal bilateral and atraumatic/condition of teeth unchanged

## 2024-08-22 NOTE — CARE UPDATE
[facility]  Nurses Note -- 4 Eyes      8/22/2024       Skin assessed on: Q Shift      [] No Pressure Injuries Present    [x]Prevention Measures Documented    [x] Yes LDA  for Pressure Injury Previously documented     [] Yes New Pressure Injury Discovered   [] LDA for New Pressure Injury Added      Attending RN:  Pablo Braswell Jr., RN

## 2024-08-22 NOTE — ASSESSMENT & PLAN NOTE
Stage 4 right plantar foot wound POA  Stage 4 right mid plantar foot wound POA  Unstageable right lateral heel wound POA  Unstageable left medial heel wound POA

## 2024-08-22 NOTE — PROGRESS NOTES
[facility] Progress Note    Patient Complaint: black stools, black vomit    PCP:   Raciel Raymond       LOS: 1        Initial History of Present Illness (HPI):  This is a 69 y.o. male consulted to GI service for upper GI bleeding. PMH DVT, DMT2, HTN, seizures, stroke on eliquis. Patient complaint of acute onset of large volume coffee ground emesis with associated symptoms of abdominal discomfort, diaphoresis and black stools that began this am. He reports his last eliquis was Tuesday evening. Denies fever, chills, syncope, BRBPR or constipation. Denies NSAID use. Denies smoking or drinking. On hemodialysis MWF.     Hgb 5.9, bun 86, creat 3.2    Interval hx  S/p EGD with oozing ulcer noted and tx with apc and clip placed. Hgb stable.     Medical History:  has a past medical history of Allergy, Clotting disorder, Deep vein thrombosis, Diabetes mellitus, type 2, Hypertension, Nuclear sclerosis of both eyes (6/9/2017), Renal disorder, Seizures, Stroke, and Urinary tract infection.    Surgical History:  has a past surgical history that includes Knee surgery; Femoral artery stent; Esophagogastroduodenoscopy (N/A, 8/17/2020); Eye surgery (Bilateral); Cystoscopy w/ retrogrades (Bilateral, 2/18/2021); Fracture surgery; Cataract extraction w/  intraocular lens implant (Right, 10/05/2017); Cataract extraction w/  intraocular lens implant (Left, 10/19/2017); incision and drainage, lower extremity (Right, 4/4/2024); incision and drainage, lower extremity (Right, 4/6/2024); incision and drainage, lower extremity (Right, 4/9/2024); Wound dressing (Right, 4/9/2024); incision and drainage, lower extremity (Right, 5/21/2024); and Esophagogastroduodenoscopy (N/A, 8/21/2024).      Objective Findings:    Vital Signs:  Temp:  [97.4 °F (36.3 °C)-98.6 °F (37 °C)]   Pulse:  []   Resp:  [5-24]   BP: (106-158)/(50-70)   SpO2:  [95 %-100 %]   Body mass index is 24.33 kg/m².      Physical Exam  Vitals and nursing note  reviewed.   Constitutional:       Appearance: Normal appearance.   HENT:      Head: Normocephalic.   Pulmonary:      Effort: Pulmonary effort is normal.   Abdominal:      General: Bowel sounds are normal.      Palpations: Abdomen is soft.   Musculoskeletal:      Comments: Right sided hemiparesis   Skin:     General: Skin is warm and dry.   Neurological:      Mental Status: He is alert and oriented to person, place, and time.   Psychiatric:         Mood and Affect: Mood normal.         Behavior: Behavior normal.         Thought Content: Thought content normal.         Judgment: Judgment normal.               Labs:  Lab Results   Component Value Date    WBC 6.17 08/22/2024    HGB 7.5 (L) 08/22/2024    HCT 21.7 (L) 08/22/2024     (L) 08/22/2024    CHOL 190 04/27/2023    TRIG 106 04/27/2023    HDL 36 (L) 04/27/2023    ALT <5 (L) 08/21/2024    AST 5 (L) 08/21/2024     08/22/2024    K 4.0 08/22/2024    CL 99 08/22/2024    CREATININE 5.6 (H) 08/22/2024    BUN 51 (H) 08/22/2024    CO2 28 08/22/2024    TSH 2.672 03/20/2024    PSA 10.2 (H) 11/19/2020    INR 1.1 08/21/2024    HGBA1C 5.2 06/24/2024             Endoscopy: 8/21 EGD- Normal esophagus.                          - Gastric ulcer with oozing hemorrhage (Mauricio                          Class Ib). Clip was placed. Clip :                          Vindicia. hemostatic spray applied.                          - Clotted blood in the gastric fundus.                          - Normal examined duodenum.                          - No specimens collected.            GI bleed. Melena. Coffee Ground emesis. Symptomatic anemia. Acute blood loss anemia. Current anticoagulant use. Gastric ulcer.  Plan/ Recommendations:  S/p egd with oozing ulcer. Denies any stools overnight or this am. Hgb stable. Ok to adv diet. Plan for dual endoscopy in 8-12 weeks for ulcer eval and colon cancer screening.          Thank you so much for allowing us to participate  in the care of Miguel Moore . Please contact us if you have any additional questions.    Bren Ovalles NP  Gastroenterology  [facility] Surg

## 2024-08-22 NOTE — TRANSFER OF CARE
"Anesthesia Transfer of Care Note    Patient: Miguel Moore    Procedure(s) Performed: Procedure(s) (LRB):  EGD (ESOPHAGOGASTRODUODENOSCOPY) (N/A)    Patient location: ICU    Anesthesia Type: general    Transport from OR: Transported from OR on 2-3 L/min O2 by NC with adequate spontaneous ventilation    Post pain: adequate analgesia    Post assessment: no apparent anesthetic complications and tolerated procedure well    Post vital signs: stable    Level of consciousness: awake, alert and oriented    Nausea/Vomiting: no nausea/vomiting    Complications: none    Transfer of care protocol was followed      Last vitals: Visit Vitals  BP (!) 154/65 (BP Location: Left arm, Patient Position: Lying)   Pulse 78   Temp 36.6 °C (97.8 °F)   Resp 16   Ht 5' 8" (1.727 m)   Wt 72.6 kg (160 lb)   SpO2 100%   BMI 24.33 kg/m²     "

## 2024-08-22 NOTE — PROGRESS NOTES
"                        Renal ICU Progress Note    Date of Admission:  8/21/2024  7:02 AM    Length of Stay: 1  Days    Subjective: no new complaints    Objective:    Current Facility-Administered Medications   Medication    0.9%  NaCl infusion (for blood administration)    0.9%  NaCl infusion (for blood administration)    0.9%  NaCl infusion (for blood administration)    acetaminophen tablet 650 mg    atorvastatin tablet 80 mg    dextrose 10% bolus 125 mL 125 mL    dextrose 10% bolus 250 mL 250 mL    ferrous gluconate tablet 324 mg    finasteride tablet 5 mg    hydrALAZINE injection 10 mg    mupirocin 2 % ointment    ondansetron injection 8 mg    oxyCODONE-acetaminophen  mg per tablet 1 tablet    pantoprazole (PROTONIX) 40 mg in 0.9% NaCl 100 mL IVPB (MB+)    polyethylene glycol packet 17 g    sevelamer carbonate tablet 800 mg    tamsulosin 24 hr capsule 0.8 mg    vitamin renal formula (B-complex-vitamin c-folic acid) 1 mg per capsule 1 capsule       Vitals:    08/22/24 0645 08/22/24 0700 08/22/24 0701 08/22/24 0715   BP: (!) 138/58 129/60  (!) 152/63   Pulse: 69 69  72   Resp: 12 10  16   Temp:   97.6 °F (36.4 °C)    TempSrc:   Axillary    SpO2: 100% 100%  100%   Weight:       Height:           I/O last 3 completed shifts:  In: 1254.7 [I.V.:383.7; Blood:871]  Out: 1500 [Other:1500]  No intake/output data recorded.      Physical Exam:     General: NAD eating lunch  Neck: supple  Heart: RRR  Lungs: unlabored breathing  Abdomen: n/a  Limbs: n/a  Neurologic: awake-alert      Laboratories:    Recent Labs   Lab 08/22/24  0346   WBC 6.45   RBC 2.87*   HGB 7.8*   HCT 23.6*      MCV 82   MCH 27.2   MCHC 33.1       Recent Labs   Lab 08/21/24  0815   CALCIUM 8.2*   ALBUMIN 2.3*   PROT 5.6*      K 4.1   CO2 25      BUN 86*   CREATININE 8.2*   ALKPHOS 38*   ALT <5*   AST 5*   BILITOT 0.6       No results for input(s): "COLORU", "CLARITYU", "SPECGRAV", "PHUR", "PROTEINUA", "GLUCOSEU", "BILIRUBINCON", " ""BLOODU", "WBCU", "RBCU", "BACTERIA", "MUCUS" in the last 24 hours.    Microbiology Results (last 7 days)       ** No results found for the last 168 hours. **              Diagnostic Tests: n/a        Assessment:    68 y/o male with ESRD on HD admitted with:      - UGIB  EGD with oozing ulcer s/p clip placement  - Paroxysmal atrial fibrillation with RVR on OACs  - DM-2 w/renal manifestations  - HTN  - Anemia of ckd and blood loss  - 2nd. Hyperparathyroidism  - Hypoalbuminemia  - Hypophosphatemia  - PAD of right lower extremity   - Hx. Of prior CVA with R-hemiplegia  - Hx. Seizure disorder due to above  - HLD        Plan:    - Dialysis Q MWF  - UF as tolerated  - Transfuse during HD as needed  - Epogen as needed  - Stop oral iron (Dialysis pte.'s get IV iron as needed only)  - f/u H&H  - Renal ADA diet + protein supplements  - Oral PO4- binders  - OACs on HOLD  - GI following s/p EGD   - Glycemic control and other problems per admitting              "

## 2024-08-23 PROBLEM — D62 ACUTE BLOOD LOSS ANEMIA (ABLA): Status: ACTIVE | Noted: 2024-08-23

## 2024-08-23 PROBLEM — K25.4 GASTRIC ULCER WITH HEMORRHAGE: Status: ACTIVE | Noted: 2024-08-23

## 2024-08-23 LAB
ANION GAP SERPL CALC-SCNC: 10 MMOL/L (ref 8–16)
BASOPHILS # BLD AUTO: 0.02 K/UL (ref 0–0.2)
BASOPHILS # BLD AUTO: 0.03 K/UL (ref 0–0.2)
BASOPHILS NFR BLD: 0.4 % (ref 0–1.9)
BASOPHILS NFR BLD: 0.5 % (ref 0–1.9)
BUN SERPL-MCNC: 70 MG/DL (ref 8–23)
CALCIUM SERPL-MCNC: 7.9 MG/DL (ref 8.7–10.5)
CHLORIDE SERPL-SCNC: 100 MMOL/L (ref 95–110)
CO2 SERPL-SCNC: 28 MMOL/L (ref 23–29)
CREAT SERPL-MCNC: 7.1 MG/DL (ref 0.5–1.4)
DIFFERENTIAL METHOD BLD: ABNORMAL
DIFFERENTIAL METHOD BLD: ABNORMAL
EOSINOPHIL # BLD AUTO: 0.1 K/UL (ref 0–0.5)
EOSINOPHIL # BLD AUTO: 0.1 K/UL (ref 0–0.5)
EOSINOPHIL NFR BLD: 1.5 % (ref 0–8)
EOSINOPHIL NFR BLD: 2.1 % (ref 0–8)
ERYTHROCYTE [DISTWIDTH] IN BLOOD BY AUTOMATED COUNT: 17.2 % (ref 11.5–14.5)
ERYTHROCYTE [DISTWIDTH] IN BLOOD BY AUTOMATED COUNT: 18.7 % (ref 11.5–14.5)
EST. GFR  (NO RACE VARIABLE): 8 ML/MIN/1.73 M^2
GLUCOSE SERPL-MCNC: 113 MG/DL (ref 70–110)
HCT VFR BLD AUTO: 18 % (ref 40–54)
HCT VFR BLD AUTO: 24.7 % (ref 40–54)
HGB BLD-MCNC: 6.1 G/DL (ref 14–18)
HGB BLD-MCNC: 8 G/DL (ref 14–18)
IMM GRANULOCYTES # BLD AUTO: 0.02 K/UL (ref 0–0.04)
IMM GRANULOCYTES # BLD AUTO: 0.03 K/UL (ref 0–0.04)
IMM GRANULOCYTES NFR BLD AUTO: 0.4 % (ref 0–0.5)
IMM GRANULOCYTES NFR BLD AUTO: 0.6 % (ref 0–0.5)
LYMPHOCYTES # BLD AUTO: 0.7 K/UL (ref 1–4.8)
LYMPHOCYTES # BLD AUTO: 1.3 K/UL (ref 1–4.8)
LYMPHOCYTES NFR BLD: 11.9 % (ref 18–48)
LYMPHOCYTES NFR BLD: 26.2 % (ref 18–48)
MCH RBC QN AUTO: 27.5 PG (ref 27–31)
MCH RBC QN AUTO: 28.4 PG (ref 27–31)
MCHC RBC AUTO-ENTMCNC: 32.4 G/DL (ref 32–36)
MCHC RBC AUTO-ENTMCNC: 33.9 G/DL (ref 32–36)
MCV RBC AUTO: 84 FL (ref 82–98)
MCV RBC AUTO: 85 FL (ref 82–98)
MONOCYTES # BLD AUTO: 0.4 K/UL (ref 0.3–1)
MONOCYTES # BLD AUTO: 0.5 K/UL (ref 0.3–1)
MONOCYTES NFR BLD: 6.8 % (ref 4–15)
MONOCYTES NFR BLD: 9.9 % (ref 4–15)
NEUTROPHILS # BLD AUTO: 2.9 K/UL (ref 1.8–7.7)
NEUTROPHILS # BLD AUTO: 4.3 K/UL (ref 1.8–7.7)
NEUTROPHILS NFR BLD: 60.8 % (ref 38–73)
NEUTROPHILS NFR BLD: 78.9 % (ref 38–73)
NRBC BLD-RTO: 0 /100 WBC
NRBC BLD-RTO: 0 /100 WBC
PLATELET # BLD AUTO: 156 K/UL (ref 150–450)
PLATELET # BLD AUTO: 165 K/UL (ref 150–450)
PMV BLD AUTO: 9 FL (ref 9.2–12.9)
PMV BLD AUTO: 9.3 FL (ref 9.2–12.9)
POTASSIUM SERPL-SCNC: 4 MMOL/L (ref 3.5–5.1)
RBC # BLD AUTO: 2.15 M/UL (ref 4.6–6.2)
RBC # BLD AUTO: 2.91 M/UL (ref 4.6–6.2)
SODIUM SERPL-SCNC: 138 MMOL/L (ref 136–145)
WBC # BLD AUTO: 4.84 K/UL (ref 3.9–12.7)
WBC # BLD AUTO: 5.48 K/UL (ref 3.9–12.7)

## 2024-08-23 PROCEDURE — 99232 SBSQ HOSP IP/OBS MODERATE 35: CPT | Mod: ,,, | Performed by: NURSE PRACTITIONER

## 2024-08-23 PROCEDURE — 63600175 PHARM REV CODE 636 W HCPCS: Performed by: HOSPITALIST

## 2024-08-23 PROCEDURE — 04L23DZ OCCLUSION OF GASTRIC ARTERY WITH INTRALUMINAL DEVICE, PERCUTANEOUS APPROACH: ICD-10-PCS | Performed by: RADIOLOGY

## 2024-08-23 PROCEDURE — P9016 RBC LEUKOCYTES REDUCED: HCPCS | Performed by: HOSPITALIST

## 2024-08-23 PROCEDURE — 36415 COLL VENOUS BLD VENIPUNCTURE: CPT | Mod: XB | Performed by: HOSPITALIST

## 2024-08-23 PROCEDURE — 63600175 PHARM REV CODE 636 W HCPCS: Performed by: RADIOLOGY

## 2024-08-23 PROCEDURE — 25500020 PHARM REV CODE 255: Performed by: HOSPITALIST

## 2024-08-23 PROCEDURE — 80100016 HC MAINTENANCE HEMODIALYSIS

## 2024-08-23 PROCEDURE — B41B1ZZ FLUOROSCOPY OF OTHER INTRA-ABDOMINAL ARTERIES USING LOW OSMOLAR CONTRAST: ICD-10-PCS | Performed by: RADIOLOGY

## 2024-08-23 PROCEDURE — 25000003 PHARM REV CODE 250: Performed by: HOSPITALIST

## 2024-08-23 PROCEDURE — 25000003 PHARM REV CODE 250: Performed by: RADIOLOGY

## 2024-08-23 PROCEDURE — 86920 COMPATIBILITY TEST SPIN: CPT | Performed by: HOSPITALIST

## 2024-08-23 PROCEDURE — 80048 BASIC METABOLIC PNL TOTAL CA: CPT | Performed by: HOSPITALIST

## 2024-08-23 PROCEDURE — 11000001 HC ACUTE MED/SURG PRIVATE ROOM

## 2024-08-23 PROCEDURE — XW0G886 INTRODUCTION OF MINERAL-BASED TOPICAL HEMOSTATIC AGENT INTO UPPER GI, VIA NATURAL OR ARTIFICIAL OPENING ENDOSCOPIC, NEW TECHNOLOGY GROUP 6: ICD-10-PCS | Performed by: RADIOLOGY

## 2024-08-23 PROCEDURE — 85025 COMPLETE CBC W/AUTO DIFF WBC: CPT | Mod: 91 | Performed by: HOSPITALIST

## 2024-08-23 PROCEDURE — 99223 1ST HOSP IP/OBS HIGH 75: CPT | Mod: ,,, | Performed by: RADIOLOGY

## 2024-08-23 PROCEDURE — 99499 UNLISTED E&M SERVICE: CPT | Mod: ,,, | Performed by: NURSE PRACTITIONER

## 2024-08-23 RX ORDER — LIDOCAINE HYDROCHLORIDE 10 MG/ML
INJECTION, SOLUTION INFILTRATION; PERINEURAL
Status: COMPLETED | OUTPATIENT
Start: 2024-08-23 | End: 2024-08-23

## 2024-08-23 RX ORDER — FENTANYL CITRATE 50 UG/ML
INJECTION, SOLUTION INTRAMUSCULAR; INTRAVENOUS
Status: COMPLETED | OUTPATIENT
Start: 2024-08-23 | End: 2024-08-23

## 2024-08-23 RX ORDER — HYDROCODONE BITARTRATE AND ACETAMINOPHEN 500; 5 MG/1; MG/1
TABLET ORAL
Status: DISCONTINUED | OUTPATIENT
Start: 2024-08-23 | End: 2024-08-31 | Stop reason: HOSPADM

## 2024-08-23 RX ORDER — HYDROCODONE BITARTRATE AND ACETAMINOPHEN 5; 325 MG/1; MG/1
2 TABLET ORAL EVERY 4 HOURS PRN
Status: ACTIVE | OUTPATIENT
Start: 2024-08-23 | End: 2024-08-23

## 2024-08-23 RX ORDER — MORPHINE SULFATE 4 MG/ML
2 INJECTION, SOLUTION INTRAMUSCULAR; INTRAVENOUS EVERY 4 HOURS PRN
Status: ACTIVE | OUTPATIENT
Start: 2024-08-23 | End: 2024-08-23

## 2024-08-23 RX ADMIN — Medication 1 APPLICATOR: at 05:08

## 2024-08-23 RX ADMIN — ACETAMINOPHEN 650 MG: 325 TABLET ORAL at 11:08

## 2024-08-23 RX ADMIN — MUPIROCIN: 20 OINTMENT TOPICAL at 09:08

## 2024-08-23 RX ADMIN — FINASTERIDE 5 MG: 5 TABLET, FILM COATED ORAL at 08:08

## 2024-08-23 RX ADMIN — FENTANYL CITRATE 50 MCG: 50 INJECTION INTRAMUSCULAR; INTRAVENOUS at 05:08

## 2024-08-23 RX ADMIN — IOHEXOL 75 ML: 350 INJECTION, SOLUTION INTRAVENOUS at 10:08

## 2024-08-23 RX ADMIN — TAMSULOSIN HYDROCHLORIDE 0.8 MG: 0.4 CAPSULE ORAL at 08:08

## 2024-08-23 RX ADMIN — Medication 1 CAPSULE: at 08:08

## 2024-08-23 RX ADMIN — LIDOCAINE HYDROCHLORIDE 10 ML: 10 INJECTION, SOLUTION INFILTRATION; PERINEURAL at 05:08

## 2024-08-23 RX ADMIN — SEVELAMER CARBONATE 800 MG: 800 TABLET, FILM COATED ORAL at 08:08

## 2024-08-23 RX ADMIN — OXYCODONE AND ACETAMINOPHEN 1 TABLET: 10; 325 TABLET ORAL at 06:08

## 2024-08-23 RX ADMIN — PANTOPRAZOLE SODIUM 8 MG/HR: 40 INJECTION, POWDER, FOR SOLUTION INTRAVENOUS at 05:08

## 2024-08-23 RX ADMIN — PANTOPRAZOLE SODIUM 8 MG/HR: 40 INJECTION, POWDER, FOR SOLUTION INTRAVENOUS at 03:08

## 2024-08-23 RX ADMIN — ATORVASTATIN CALCIUM 80 MG: 40 TABLET, FILM COATED ORAL at 08:08

## 2024-08-23 NOTE — BRIEF OP NOTE
Radiology Post-Procedure Note    Pre Op Diagnosis: 1. ABLA requiring multiple transfusions 2/2 acute gastric ulcer hemorrhage  Post Op Diagnosis: Same    Procedure: 1. US-guided access of the RCFA 2. Fluoroscopic-guided celiac axis angiogram 3. Fluoroscopic-guided Lt gastric artery angiogram 4. Fluoroscopic-guided embolization of the Lt gastric artery utilizing [employer] Embold coils ( 5-mm x 8-cm coils x 4 and 4-mm x 8-cm coil x 1) 5. Fluoroscopic-guided post-embolization angiogram of the Lt gastric artery 6. Fluoroscopic-guided RCFA angiogram 7. Fluoroscopic-guided Angioseal vascular closure device deployment to the RCFA access    Procedure performed by: Eran Ravi MD    Written Informed Consent Obtained: Yes  Specimen Removed: NO  Estimated Blood Loss: Minimal    Findings:   Successful visceral angiogram with no DSA evidence of persistent active contrast extravasation in the region of the gastric fundus/cardia. Given the continued ongoing bleeding requiring multiple ongoing blood transfusions, the decision was made to electively coil embolize the Lt gastric artery supplying the region of interest.     Now s/p successful coil embolization of the Lt gastric artery utilizing [employer] Embold coils ( 5-mm x 8-cm coils x 4 and 4-mm x 8-cm coil x 1). No evidence of continued flow through the Lt gastric artery on post-embolization angiogram.    RCFA access successfully closed utilizing Angioseal vascular closure device. Bed rest and pt to keep RLE/Rt groin straight and still x 2 hours post-op (ie 8 PM tonight).    Thank you for considering IR for the care of your patient.     Eran Ravi MD  Interventional Radiology

## 2024-08-23 NOTE — PT/OT/SLP PROGRESS
Occupational Therapy      Patient Name:  Miguel Moore   MRN:  [id number]    Patient not seen today secondary to Blood transfusion (Low H&H (6.1/18.0), blood transfusion ordered). Will follow-up later as able.    8/23/2024

## 2024-08-23 NOTE — PT/OT/SLP PROGRESS
Physical Therapy      Patient Name:  Miguel Moore   MRN:  [id number]    Patient not seen today secondary to Therapist assessment, Other (Comment) (Pt with low H&H; 6.1&18.  Pt receiving blood.). Will follow-up when appropriate.

## 2024-08-23 NOTE — CONSULTS
Radiology History & Physical      SUBJECTIVE:     Chief Complaint: 1. ABLA requiring multiple transfusions 2/2 acute gastric ulcer hemorrhage    History of Present Illness:  Miguel Moore is a 69 y.o. male with PMHx of HTN, HPLD, DMII, ESRD on HD, chronic DVT and pAfib on Eliquis who is admitted with ABLA requiring multiple continued ongoing transfusions 2/2 acute gastric ulcer hemorrhage note on EGD on 8/21/24 requiring further evaluation and intervention.    A new inpatient IR consult received for US and fluoroscopic-guided visceral angiogram and likely elective embolization of the Lt gastric artery.    Past Medical History:   Diagnosis Date    Allergy     Clotting disorder     Elaquis and APlavix    Deep vein thrombosis     Diabetes mellitus, type 2     Hypertension     Nuclear sclerosis of both eyes 6/9/2017    Renal disorder     Seizures     Stroke     Urinary tract infection      Past Surgical History:   Procedure Laterality Date    CATARACT EXTRACTION W/  INTRAOCULAR LENS IMPLANT Right 10/05/2017    Dr. Tay    CATARACT EXTRACTION W/  INTRAOCULAR LENS IMPLANT Left 10/19/2017    Dr. Tay    CYSTOSCOPY W/ RETROGRADES Bilateral 2/18/2021    Procedure: CYSTOSCOPY, WITH RETROGRADE PYELOGRAM;  Surgeon: JEMMA Styles MD;  Location: [facility] OR;  Service: Urology;  Laterality: Bilateral;  REQUESTING EARLY AS POSSIBE-LO / 2/8/2021 @ 1:13PM  RN Pre Op 2-11-21, Covid NEGATIVE ON  2-17-21.  C A    ESOPHAGOGASTRODUODENOSCOPY N/A 8/17/2020    Procedure: EGD (ESOPHAGOGASTRODUODENOSCOPY);  Surgeon: Desmond Chapman MD;  Location: [facility] ENDO;  Service: Endoscopy;  Laterality: N/A;    ESOPHAGOGASTRODUODENOSCOPY N/A 8/21/2024    Procedure: EGD (ESOPHAGOGASTRODUODENOSCOPY);  Surgeon: Tonie Chauhan MD;  Location: [facility] ENDO;  Service: Endoscopy;  Laterality: N/A;    EYE SURGERY Bilateral     cataract    FEMORAL ARTERY STENT      FRACTURE SURGERY      INCISION AND DRAINAGE, LOWER EXTREMITY Right 4/4/2024     Procedure: INCISION AND DRAINAGE, LOWER EXTREMITY;  Surgeon: Jimbo Montilla III, MD;  Location: [facility] OR;  Service: Orthopedics;  Laterality: Right;    INCISION AND DRAINAGE, LOWER EXTREMITY Right 4/6/2024    Procedure: INCISION AND DRAINAGE, LOWER EXTREMITY;  Surgeon: Desmond Barillas MD;  Location: [facility] OR;  Service: Orthopedics;  Laterality: Right;  foot and ankle    INCISION AND DRAINAGE, LOWER EXTREMITY Right 4/9/2024    Procedure: INCISION AND DRAINAGE, LOWER EXTREMITY- FOOT & ANKLE;  Surgeon: Jimbo Montilla III, MD;  Location: [facility] OR;  Service: Orthopedics;  Laterality: Right;  CULTURES, VASCHE    INCISION AND DRAINAGE, LOWER EXTREMITY Right 5/21/2024    Procedure: INCISION AND DRAINAGE, LOWER EXTREMITY;  Surgeon: Jimbo Montilla III, MD;  Location: [facility] OR;  Service: Orthopedics;  Laterality: Right;  Pulsavac and Vashe    KNEE SURGERY      bilateral scope    WOUND DRESSING Right 4/9/2024    Procedure: WOUND VAC EXCHANGE;  Surgeon: Jimbo Montilla III, MD;  Location: [facility] OR;  Service: Orthopedics;  Laterality: Right;     Home Meds:   Prior to Admission medications    Medication Sig Start Date End Date Taking? Authorizing Provider   atorvastatin (LIPITOR) 80 MG tablet Take 1 tablet (80 mg total) by mouth once daily. 7/30/24 7/30/25  Raciel Raymond MD   ferrous gluconate (FERGON) 324 MG tablet Take 1 tablet (324 mg total) by mouth daily with breakfast. 7/30/24   Raciel Raymond MD   finasteride (PROSCAR) 5 mg tablet Take 1 tablet (5 mg total) by mouth once daily. 2/27/24 2/26/25  Lee Styles MD   oxyCODONE-acetaminophen (PERCOCET)  mg per tablet Take 1 tablet by mouth every 24 hours as needed for Pain. Medically necessary more than 7 days 7/30/24   Raciel Raymond MD   RENVELA 800 mg Tab Take 1 tablet (800 mg total) by mouth 3 (three) times daily with meals. 8/2/22   Raciel Raymond MD   tamsulosin (FLOMAX) 0.4 mg Cap Take 2 capsules (0.8 mg total) by mouth once daily. 2/27/24    Lee Styles MD   vitamin renal formula, B-complex-vitamin c-folic acid, (NEPHROCAP) 1 mg Cap Take 1 capsule by mouth once daily. 4/20/24   Rosalva Manning MD     Anticoagulants/Antiplatelets: no anticoagulation    Allergies:   Review of patient's allergies indicates:   Allergen Reactions    Ace inhibitors      Hyperkalemia 8/2018  Other reaction(s): Unknown    Penicillins Hives     Tolerated cefepime and cefdinir previously    Tizanidine      Felt hot     Sedation History:  no adverse reactions    Review of Systems:   Hematological: positive for - bleeding problems, blood transfusions, and fatigue  Respiratory: no cough, shortness of breath, or wheezing  Cardiovascular: no chest pain or dyspnea on exertion  Gastrointestinal: positive for - abdominal pain and hematemesis  Genito-Urinary: no dysuria, trouble voiding, or hematuria  Musculoskeletal: negative  Neurological: no TIA or stroke symptoms       OBJECTIVE:     Vital Signs (Most Recent)  Temp: 98 °F (36.7 °C) (08/23/24 1230)  Pulse: 66 (08/23/24 1300)  Resp: 18 (08/23/24 1230)  BP: (!) 139/55 (08/23/24 1300)  SpO2: 100 % (08/23/24 0806)    Physical Exam:  ASA: III  Mallampati: II    General: no acute distress  Mental Status: alert and oriented to person, place and time  HEENT: normocephalic, atraumatic  Chest: unlabored breathing  Heart: regular heart rate  Abdomen: nondistended  Extremity: moves all extremities    Laboratory  Lab Results   Component Value Date    INR 1.1 08/21/2024       Lab Results   Component Value Date    WBC 4.84 08/23/2024    HGB 6.1 (L) 08/23/2024    HCT 18.0 (LL) 08/23/2024    MCV 84 08/23/2024     08/23/2024      Lab Results   Component Value Date     (H) 08/23/2024     08/23/2024    K 4.0 08/23/2024     08/23/2024    CO2 28 08/23/2024    BUN 70 (H) 08/23/2024    CREATININE 7.1 (H) 08/23/2024    CALCIUM 7.9 (L) 08/23/2024    MG 2.0 08/21/2024    ALT <5 (L) 08/21/2024    AST 5 (L) 08/21/2024     ALBUMIN 2.3 (L) 08/21/2024    BILITOT 0.6 08/21/2024     ASSESSMENT/PLAN:     69 y.o. male with ABLA requiring multiple continued ongoing transfusions 2/2 acute gastric ulcer hemorrhage note on EGD on 8/21/24 requiring further evaluation and intervention.    ABLA requiring multiple transfusions 2/2 acute gastric ulcer hemorrhage - Will attempt US and fluoroscopic-guided visceral angiogram and likely elective embolization of the Lt gastric artery with local anesthetic and up to moderate conscious sedation.    Risks (including, but not limited to, pain, bleeding, infection, damage to nearby structures, failure to obtain sufficient material for a diagnosis, the need for additional procedures, and death), benefits, and alternatives were discussed with the patient. All questions were answered to the best of my abilities. The patient wishes to proceed with the procedure. Written informed consent was obtained.    Thank you for considering IR for the care of your patient.     Eran Ravi MD  Interventional Radiology

## 2024-08-23 NOTE — SUBJECTIVE & OBJECTIVE
Interval History:   HH is drped today,will transfuse pack of RBC and do CTA abdomen,pelvic,,  Consulted PT,OT as well.  Review of Systems   Constitutional:  Positive for activity change and appetite change.   Respiratory:  Negative for shortness of breath.    Cardiovascular:  Negative for chest pain.   Gastrointestinal:  Negative for abdominal distention.   Skin:  Positive for wound.   Neurological:  Positive for weakness.     Objective:     Vital Signs (Most Recent):  Temp: 98.8 °F (37.1 °C) (08/23/24 0806)  Pulse: 82 (08/23/24 0806)  Resp: 18 (08/23/24 0806)  BP: 133/63 (08/23/24 0806)  SpO2: 100 % (08/23/24 0806) Vital Signs (24h Range):  Temp:  [97.8 °F (36.6 °C)-98.8 °F (37.1 °C)] 98.8 °F (37.1 °C)  Pulse:  [73-96] 82  Resp:  [5-18] 18  SpO2:  [96 %-100 %] 100 %  BP: (111-143)/(48-64) 133/63     Weight: 78 kg (171 lb 15.3 oz)  Body mass index is 26.15 kg/m².    Intake/Output Summary (Last 24 hours) at 8/23/2024 1057  Last data filed at 8/22/2024 1700  Gross per 24 hour   Intake 739.64 ml   Output --   Net 739.64 ml         Physical Exam  Cardiovascular:      Rate and Rhythm: Normal rate.   Musculoskeletal:         General: No swelling.   Skin:     General: Skin is warm.      Comments: Multiple leg wounds   Neurological:      Mental Status: He is alert.             Significant Labs: All pertinent labs within the past 24 hours have been reviewed.  BMP:   Recent Labs   Lab 08/23/24  0625   *      K 4.0      CO2 28   BUN 70*   CREATININE 7.1*   CALCIUM 7.9*     CBC:   Recent Labs   Lab 08/22/24  1034 08/22/24  1829 08/23/24  0625   WBC 6.17 7.05 4.84   HGB 7.5* 7.2* 6.1*   HCT 21.7* 21.3* 18.0*   * 176 156     CMP:   Recent Labs   Lab 08/22/24  1034 08/23/24  0625    138   K 4.0 4.0   CL 99 100   CO2 28 28   * 113*   BUN 51* 70*   CREATININE 5.6* 7.1*   CALCIUM 8.1* 7.9*   ANIONGAP 11 10       Significant Imaging: I have reviewed all pertinent imaging results/findings within  the past 24 hours.

## 2024-08-23 NOTE — PROGRESS NOTES
"                        Renal Progress Note    Date of Admission:  8/21/2024  7:02 AM    Length of Stay: 2  Days    Subjective: n/a    Objective:    Current Facility-Administered Medications   Medication    acetaminophen tablet 650 mg    atorvastatin tablet 80 mg    dextrose 10% bolus 125 mL 125 mL    dextrose 10% bolus 250 mL 250 mL    finasteride tablet 5 mg    hydrALAZINE injection 10 mg    mupirocin 2 % ointment    ondansetron injection 8 mg    oxyCODONE-acetaminophen  mg per tablet 1 tablet    pantoprazole (PROTONIX) 40 mg in 0.9% NaCl 100 mL IVPB (MB+)    polyethylene glycol packet 17 g    sevelamer carbonate tablet 800 mg    tamsulosin 24 hr capsule 0.8 mg    vitamin renal formula (B-complex-vitamin c-folic acid) 1 mg per capsule 1 capsule       Vitals:    08/23/24 0034 08/23/24 0400 08/23/24 0732 08/23/24 0806   BP:  (!) 126/58  133/63   BP Location:  Left leg  Right leg   Patient Position:  Lying  Lying   Pulse: 75 89 84 82   Resp:  18  18   Temp:  98.7 °F (37.1 °C)  98.8 °F (37.1 °C)   TempSrc:  Oral  Oral   SpO2:  99%  100%   Weight:       Height:           I/O last 3 completed shifts:  In: 2004.4 [P.O.:800; I.V.:583.4; Blood:621]  Out: 1500 [Other:1500]  No intake/output data recorded.      Physical Exam: out of the unit      Laboratories:    Recent Labs   Lab 08/22/24  1829   WBC 7.05   RBC 2.56*   HGB 7.2*   HCT 21.3*      MCV 83   MCH 28.1   MCHC 33.8       Recent Labs   Lab 08/23/24  0625   CALCIUM 7.9*      K 4.0   CO2 28      BUN 70*   CREATININE 7.1*       No results for input(s): "COLORU", "CLARITYU", "SPECGRAV", "PHUR", "PROTEINUA", "GLUCOSEU", "BILIRUBINCON", "BLOODU", "WBCU", "RBCU", "BACTERIA", "MUCUS" in the last 24 hours.    Microbiology Results (last 7 days)       ** No results found for the last 168 hours. **              Diagnostic Tests: n/a        Assessment:    68 y/o male with ESRD on HD admitted with:      - UGIB  EGD with oozing ulcer s/p clip placement  - " Paroxysmal atrial fibrillation with RVR on OACs  - DM-2 w/renal manifestations  - HTN  - Anemia of ckd and blood loss  - 2nd. Hyperparathyroidism  - Hypoalbuminemia  - Hypophosphatemia  - PAD of right lower extremity   - Hx. Of prior CVA with R-hemiplegia  - Hx. Seizure disorder due to above  - HLD        Plan:    - Dialysis Q MWF  - UF as tolerated  - Transfuse during HD as needed  - Epogen as needed  - f/u H&H  - Renal ADA diet + protein supplements  - Oral PO4- binders  - OACs on HOLD  - GI following s/p EGD   - Disposition, glycemic control and other problems per admitting    Will follow on Dialysis days only.

## 2024-08-23 NOTE — ASSESSMENT & PLAN NOTE
Patient has acute blood loss due to hemorrhage, the hemorrhage is due to gastrointestinal bleed, patient does have a propensity for bleeding due to a medication, the medication is Eliquis.. Will trend hemoglobin/hematocrit Every 8 hours, as well as monitor and correct for any coagulation defects. CBC and vital signs have been reviewed and last CBC was noted-   Lab Results   Component Value Date    WBC 4.84 08/23/2024    HGB 6.1 (L) 08/23/2024    HCT 18.0 (LL) 08/23/2024    MCV 84 08/23/2024     08/23/2024   Patient has been started on GI bleed pathway orders.  GI consulted and patient has been started on Protonix drip.  Eliquis held.  Transfused 3 units of blood with adequate correction of H/H.  EGD showing gastric ulcer with oozing hemorrhage.  Treated with clip.  Continue IV Protonix for 72 hours.  Continue to monitor H/H and holding Eliquis.  Started on clear liquid diet.  If further bleeding,   HH is drped today,will transfuse pack of RBC and do CTA abdomen,pelvic,,

## 2024-08-23 NOTE — PLAN OF CARE
Problem: Adult Inpatient Plan of Care  Goal: Plan of Care Review  Outcome: Progressing  Flowsheets (Taken 8/23/2024 0445)  Plan of Care Reviewed With: patient  Goal: Absence of Hospital-Acquired Illness or Injury  Outcome: Progressing  Intervention: Identify and Manage Fall Risk  Flowsheets (Taken 8/23/2024 0445)  Safety Promotion/Fall Prevention:   assistive device/personal item within reach   bed alarm set   Fall Risk reviewed with patient/family   side rails raised x 2   room near unit station  Intervention: Prevent Skin Injury  Flowsheets (Taken 8/23/2024 0445)  Body Position: turned  Skin Protection: incontinence pads utilized  Device Skin Pressure Protection:   absorbent pad utilized/changed   adhesive use limited   positioning supports utilized  Intervention: Prevent and Manage VTE (Venous Thromboembolism) Risk  Flowsheets (Taken 8/23/2024 0445)  VTE Prevention/Management: bleeding risk assessed  Intervention: Prevent Infection  Flowsheets (Taken 8/23/2024 0445)  Infection Prevention:   single patient room provided   rest/sleep promoted     Patient is AAOx4. On room air. Tele maintained. No falls or injuries reported during shift, safety precautions maintained.

## 2024-08-23 NOTE — PROGRESS NOTES
[facility] Medicine  Progress Note    Patient Name: Miguel Moore  MRN: [id number]  Patient Class: IP- Inpatient   Admission Date: 8/21/2024  Length of Stay: 2 days  Attending Physician: Braden Heredia, *  Primary Care Provider: Raciel Raymond MD        Subjective:     Principal Problem:GI bleed        HPI:  70 y/o male with a PMHx of DVT, PAF on Eliquis, DMT2, HTN, seizures, stroke, presents to the ER for evaluation of coffee ground emesis and darks stool that started early this morning.  Patient reports 2 episodes of coffee ground emesis and 1 episode of dark stool.  Also reports associated abdominal pain which has now resolved.  No alleviating or aggravating factors.  Patient has not taken his Eliquis today.  He presented to ER where he was noted to be tachycardic.  Labs showing Hgb much lower than baseline.  He denies any fever or chills.  No other complaints.    Overview/Hospital Course:  70 y/o male with a PMHx of DVT, PAF on Eliquis, DMT2, HTN, ESRD, stroke admitted with GI bleed.  Hgb of 5.9, tachycardic and with active bleeding and admitted to ICU.  Started on Protonix drip.  GI consulted.  Nephrology consulted for HD.  Patient was taken for EGD that showed gastric ulcer with oozing hemorrhage, treated with a clip.  Patient was transfused 3 units of blood with good correction of H/H.  Eliquis held.  Will need to continue IV Protonix for 72 hours.until tomorrow.  Wound Care consulted for chronic LE wounds.  HH is drped today,will transfuse pack of RBC and do CTA abdomen,pelvic,,  Consulted PT,OT as well.    Interval History:   HH is drped today,will transfuse pack of RBC and do CTA abdomen,pelvic,,  Consulted PT,OT as well.  Review of Systems   Constitutional:  Positive for activity change and appetite change.   Respiratory:  Negative for shortness of breath.    Cardiovascular:  Negative for chest pain.   Gastrointestinal:  Negative for abdominal distention.   Skin:  Positive  for wound.   Neurological:  Positive for weakness.     Objective:     Vital Signs (Most Recent):  Temp: 98.8 °F (37.1 °C) (08/23/24 0806)  Pulse: 82 (08/23/24 0806)  Resp: 18 (08/23/24 0806)  BP: 133/63 (08/23/24 0806)  SpO2: 100 % (08/23/24 0806) Vital Signs (24h Range):  Temp:  [97.8 °F (36.6 °C)-98.8 °F (37.1 °C)] 98.8 °F (37.1 °C)  Pulse:  [73-96] 82  Resp:  [5-18] 18  SpO2:  [96 %-100 %] 100 %  BP: (111-143)/(48-64) 133/63     Weight: 78 kg (171 lb 15.3 oz)  Body mass index is 26.15 kg/m².    Intake/Output Summary (Last 24 hours) at 8/23/2024 1057  Last data filed at 8/22/2024 1700  Gross per 24 hour   Intake 739.64 ml   Output --   Net 739.64 ml         Physical Exam  Cardiovascular:      Rate and Rhythm: Normal rate.   Musculoskeletal:         General: No swelling.   Skin:     General: Skin is warm.      Comments: Multiple leg wounds   Neurological:      Mental Status: He is alert.             Significant Labs: All pertinent labs within the past 24 hours have been reviewed.  BMP:   Recent Labs   Lab 08/23/24  0625   *      K 4.0      CO2 28   BUN 70*   CREATININE 7.1*   CALCIUM 7.9*     CBC:   Recent Labs   Lab 08/22/24  1034 08/22/24  1829 08/23/24  0625   WBC 6.17 7.05 4.84   HGB 7.5* 7.2* 6.1*   HCT 21.7* 21.3* 18.0*   * 176 156     CMP:   Recent Labs   Lab 08/22/24  1034 08/23/24  0625    138   K 4.0 4.0   CL 99 100   CO2 28 28   * 113*   BUN 51* 70*   CREATININE 5.6* 7.1*   CALCIUM 8.1* 7.9*   ANIONGAP 11 10       Significant Imaging: I have reviewed all pertinent imaging results/findings within the past 24 hours.    Assessment/Plan:      * GI bleed  Patient has acute blood loss due to hemorrhage, the hemorrhage is due to gastrointestinal bleed, patient does have a propensity for bleeding due to a medication, the medication is Eliquis.. Will trend hemoglobin/hematocrit Every 8 hours, as well as monitor and correct for any coagulation defects. CBC and vital signs  have been reviewed and last CBC was noted-   Lab Results   Component Value Date    WBC 4.84 08/23/2024    HGB 6.1 (L) 08/23/2024    HCT 18.0 (LL) 08/23/2024    MCV 84 08/23/2024     08/23/2024   Patient has been started on GI bleed pathway orders.  GI consulted and patient has been started on Protonix drip.  Eliquis held.  Transfused 3 units of blood with adequate correction of H/H.  EGD showing gastric ulcer with oozing hemorrhage.  Treated with clip.  Continue IV Protonix for 72 hours.  Continue to monitor H/H and holding Eliquis.  Started on clear liquid diet.  If further bleeding,   HH is drped today,will transfuse pack of RBC and do CTA abdomen,pelvic,,      Acute blood loss anemia  HH is drped today,will transfuse pack of RBC and do CTA abdomen,pelvic,,      Wounds, multiple  Stage 4 right plantar foot wound POA  Stage 4 right mid plantar foot wound POA  Unstageable right lateral heel wound POA  Unstageable left medial heel wound POA      Paroxysmal atrial fibrillation  Patient with Paroxysmal (<7 days) atrial fibrillation which is controlled. Patient is currently in sinus rhythm.[id number] Score: 3  Hold home Eliquis with GI bleed.    Anemia in chronic kidney disease  Anemia is likely due to acute blood loss which was from GI bleed and chronic disease due to ESRD. Most recent hemoglobin and hematocrit are listed below.  Recent Labs     08/21/24  1256 08/21/24  2247 08/22/24  0346   HGB 5.0* 8.6* 7.8*   HCT 14.9* 24.4* 23.6*       Plan  Transfused 3 units of blood with adequate correction of H/H.  Continue iron replacement.    Chronic deep vein thrombosis (DVT) of popliteal vein of right lower extremity 9/21/20 outside [place]; unprovoked; anticoagulation  Hold Eliquis with GI bleed.      Type 2 diabetes mellitus with diabetic neuropathy, with long-term current use of insulin  Patient's FSGs are controlled on current medication regimen.  Last A1c reviewed-   Lab Results   Component Value Date    HGBA1C 5.2  "06/24/2024     Most recent fingerstick glucose reviewed- No results for input(s): "POCTGLUCOSE" in the last 24 hours.  Current correctional scale  Medium  Maintain anti-hyperglycemic dose as follows-   Antihyperglycemics (From admission, onward)      None          Hold Oral hypoglycemics while patient is in the hospital.    ESRD (end stage renal disease)  Creatine stable for now. BMP reviewed- noted Estimated Creatinine Clearance: 8.1 mL/min (A) (based on SCr of 8.2 mg/dL (H)). according to latest data. Based on current GFR, CKD stage is end stage.  Monitor UOP and serial BMP and adjust therapy as needed. Renally dose meds. Avoid nephrotoxic medications and procedures.  Nephrology consulted for HD.    Hemiplegia and hemiparesis following cerebral infarction affecting right dominant side  Stable       BPH (benign prostatic hyperplasia) 2/18/21 prostate bx normal  Continue home medications.      Dyslipidemia  Continue statin      Benign essential HTN  Chronic, controlled. Latest blood pressure and vitals reviewed-     Temp:  [97.3 °F (36.3 °C)-98.6 °F (37 °C)]   Pulse:  []   Resp:  [5-24]   BP: (106-158)/(50-70)   SpO2:  [95 %-100 %] .   No Bp medications on home med rec.  Low Na diet once advanced.  Will utilize p.r.n. blood pressure medication only if patient's blood pressure greater than 180/110 and he develops symptoms such as worsening chest pain or shortness of breath.      VTE Risk Mitigation (From admission, onward)           Ordered     IP VTE HIGH RISK PATIENT  Once         08/21/24 0957     Place sequential compression device  Until discontinued         08/21/24 0957                    Discharge Planning   GARY:      Code Status: Full Code   Is the patient medically ready for discharge?:     Reason for patient still in hospital (select all that apply): Patient trending condition  Discharge Plan A: Home Health                  Braden Heredia MD  Department of Hospital Medicine   [facility] - [place], [place] - " Telemetry

## 2024-08-23 NOTE — PLAN OF CARE
Pt tolerated procedure without s/sx of complication. Report given to DARRION Olivo in PACU. PT transported to PACU on stretcher per IR RN.

## 2024-08-23 NOTE — NURSING
Patient off the unit via bed, on room air, awake and alert. Going to CT scan. IV protonix continued.

## 2024-08-23 NOTE — PROGRESS NOTES
[facility] Progress Note    Patient Complaint: black stools, black vomit    PCP:   Raciel Raymond       LOS: 2        Initial History of Present Illness (HPI):  This is a 69 y.o. male consulted to GI service for upper GI bleeding. PMH DVT, DMT2, HTN, seizures, stroke on eliquis. Patient complaint of acute onset of large volume coffee ground emesis with associated symptoms of abdominal discomfort, diaphoresis and black stools that began this am. He reports his last eliquis was Tuesday evening. Denies fever, chills, syncope, BRBPR or constipation. Denies NSAID use. Denies smoking or drinking. On hemodialysis MWF.     Hgb 5.9, bun 86, creat 3.2    Interval hx  S/p EGD with oozing ulcer noted and tx with apc and clip placed. Hgb decreased this am. Reports some dark stool overnight but denies large volume. Suspect old blood.     Medical History:  has a past medical history of Allergy, Clotting disorder, Deep vein thrombosis, Diabetes mellitus, type 2, Hypertension, Nuclear sclerosis of both eyes (6/9/2017), Renal disorder, Seizures, Stroke, and Urinary tract infection.    Surgical History:  has a past surgical history that includes Knee surgery; Femoral artery stent; Esophagogastroduodenoscopy (N/A, 8/17/2020); Eye surgery (Bilateral); Cystoscopy w/ retrogrades (Bilateral, 2/18/2021); Fracture surgery; Cataract extraction w/  intraocular lens implant (Right, 10/05/2017); Cataract extraction w/  intraocular lens implant (Left, 10/19/2017); incision and drainage, lower extremity (Right, 4/4/2024); incision and drainage, lower extremity (Right, 4/6/2024); incision and drainage, lower extremity (Right, 4/9/2024); Wound dressing (Right, 4/9/2024); incision and drainage, lower extremity (Right, 5/21/2024); and Esophagogastroduodenoscopy (N/A, 8/21/2024).      Objective Findings:    Vital Signs:  Temp:  [97.8 °F (36.6 °C)-98.8 °F (37.1 °C)]   Pulse:  [66-96]   Resp:  [11-18]   BP: (108-144)/(48-64)   SpO2:   [96 %-100 %]   Body mass index is 26.15 kg/m².      Physical Exam  Vitals and nursing note reviewed.   Constitutional:       Appearance: Normal appearance.   HENT:      Head: Normocephalic.   Pulmonary:      Effort: Pulmonary effort is normal.   Abdominal:      General: Bowel sounds are normal.      Palpations: Abdomen is soft.   Musculoskeletal:      Comments: Right sided hemiparesis   Skin:     General: Skin is warm and dry.   Neurological:      Mental Status: He is alert and oriented to person, place, and time.   Psychiatric:         Mood and Affect: Mood normal.         Behavior: Behavior normal.         Thought Content: Thought content normal.         Judgment: Judgment normal.               Labs:  Lab Results   Component Value Date    WBC 4.84 08/23/2024    HGB 6.1 (L) 08/23/2024    HCT 18.0 (LL) 08/23/2024     08/23/2024    CHOL 190 04/27/2023    TRIG 106 04/27/2023    HDL 36 (L) 04/27/2023    ALT <5 (L) 08/21/2024    AST 5 (L) 08/21/2024     08/23/2024    K 4.0 08/23/2024     08/23/2024    CREATININE 7.1 (H) 08/23/2024    BUN 70 (H) 08/23/2024    CO2 28 08/23/2024    TSH 2.672 03/20/2024    PSA 10.2 (H) 11/19/2020    INR 1.1 08/21/2024    HGBA1C 5.2 06/24/2024             Endoscopy: 8/21 EGD- Normal esophagus.                          - Gastric ulcer with oozing hemorrhage (Mauricio                          Class Ib). Clip was placed. Clip :                          [employer]. hemostatic spray applied.                          - Clotted blood in the gastric fundus.                          - Normal examined duodenum.                          - No specimens collected.            GI bleed. Melena. Coffee Ground emesis. Symptomatic anemia. Acute blood loss anemia. Current anticoagulant use. Gastric ulcer.  Plan/ Recommendations:  S/p egd with oozing ulcer. Hgb 6.9, agree with transfusion. Suspect patient is settling out and do NOT suspect re-bleeding. Continue IV ppi until  completion on tomorrow, ordered adjusted with appropriate completion time. Plan to d/c from GI standpoint when ppi drip complete and transition to ppi po bid x 8 weeks. Plan for f/u dual endoscopy in 8-12 weeks, request sent.     Will sign off    Thank you so much for allowing us to participate in the care of Miguel Moore . Please contact us if you have any additional questions.    Bren Ovalles NP  Gastroenterology  [facility] [facility]

## 2024-08-23 NOTE — NURSING
[facility], [facility]  Nurses Note -- 4 Eyes      8/22/2024       Skin assessed on: Q Shift      [] No Pressure Injuries Present    [x]Prevention Measures Documented    [x] Yes LDA  for Pressure Injury Previously documented     [] Yes New Pressure Injury Discovered   [] LDA for New Pressure Injury Added      Attending RN:  Frank Wilson RN     Second RN:  Vickie King LPN

## 2024-08-24 LAB
ANION GAP SERPL CALC-SCNC: 10 MMOL/L (ref 8–16)
BASOPHILS # BLD AUTO: 0.04 K/UL (ref 0–0.2)
BASOPHILS NFR BLD: 0.7 % (ref 0–1.9)
BUN SERPL-MCNC: 44 MG/DL (ref 8–23)
CALCIUM SERPL-MCNC: 8.1 MG/DL (ref 8.7–10.5)
CHLORIDE SERPL-SCNC: 105 MMOL/L (ref 95–110)
CO2 SERPL-SCNC: 23 MMOL/L (ref 23–29)
CREAT SERPL-MCNC: 5.3 MG/DL (ref 0.5–1.4)
DIFFERENTIAL METHOD BLD: ABNORMAL
EOSINOPHIL # BLD AUTO: 0.2 K/UL (ref 0–0.5)
EOSINOPHIL NFR BLD: 3.3 % (ref 0–8)
ERYTHROCYTE [DISTWIDTH] IN BLOOD BY AUTOMATED COUNT: 17.4 % (ref 11.5–14.5)
ERYTHROCYTE [DISTWIDTH] IN BLOOD BY AUTOMATED COUNT: 18 % (ref 11.5–14.5)
EST. GFR  (NO RACE VARIABLE): 11 ML/MIN/1.73 M^2
GLUCOSE SERPL-MCNC: 106 MG/DL (ref 70–110)
HCT VFR BLD AUTO: 23.8 % (ref 40–54)
HCT VFR BLD AUTO: 24.9 % (ref 40–54)
HGB BLD-MCNC: 7.9 G/DL (ref 14–18)
HGB BLD-MCNC: 8 G/DL (ref 14–18)
IMM GRANULOCYTES # BLD AUTO: 0.02 K/UL (ref 0–0.04)
IMM GRANULOCYTES NFR BLD AUTO: 0.4 % (ref 0–0.5)
LYMPHOCYTES # BLD AUTO: 1 K/UL (ref 1–4.8)
LYMPHOCYTES NFR BLD: 18.3 % (ref 18–48)
MCH RBC QN AUTO: 27.6 PG (ref 27–31)
MCH RBC QN AUTO: 28.7 PG (ref 27–31)
MCHC RBC AUTO-ENTMCNC: 32.1 G/DL (ref 32–36)
MCHC RBC AUTO-ENTMCNC: 33.2 G/DL (ref 32–36)
MCV RBC AUTO: 86 FL (ref 82–98)
MCV RBC AUTO: 87 FL (ref 82–98)
MONOCYTES # BLD AUTO: 0.5 K/UL (ref 0.3–1)
MONOCYTES NFR BLD: 9.5 % (ref 4–15)
NEUTROPHILS # BLD AUTO: 3.9 K/UL (ref 1.8–7.7)
NEUTROPHILS NFR BLD: 67.8 % (ref 38–73)
NRBC BLD-RTO: 0 /100 WBC
PLATELET # BLD AUTO: 173 K/UL (ref 150–450)
PLATELET # BLD AUTO: 175 K/UL (ref 150–450)
PMV BLD AUTO: 9.2 FL (ref 9.2–12.9)
PMV BLD AUTO: 9.6 FL (ref 9.2–12.9)
POTASSIUM SERPL-SCNC: 3.9 MMOL/L (ref 3.5–5.1)
RBC # BLD AUTO: 2.75 M/UL (ref 4.6–6.2)
RBC # BLD AUTO: 2.9 M/UL (ref 4.6–6.2)
SODIUM SERPL-SCNC: 138 MMOL/L (ref 136–145)
WBC # BLD AUTO: 4.96 K/UL (ref 3.9–12.7)
WBC # BLD AUTO: 5.68 K/UL (ref 3.9–12.7)

## 2024-08-24 PROCEDURE — 85025 COMPLETE CBC W/AUTO DIFF WBC: CPT | Performed by: HOSPITALIST

## 2024-08-24 PROCEDURE — 36415 COLL VENOUS BLD VENIPUNCTURE: CPT | Performed by: HOSPITALIST

## 2024-08-24 PROCEDURE — 63600175 PHARM REV CODE 636 W HCPCS: Performed by: HOSPITALIST

## 2024-08-24 PROCEDURE — 94761 N-INVAS EAR/PLS OXIMETRY MLT: CPT

## 2024-08-24 PROCEDURE — 97161 PT EVAL LOW COMPLEX 20 MIN: CPT | Performed by: PHYSICAL THERAPIST

## 2024-08-24 PROCEDURE — 85027 COMPLETE CBC AUTOMATED: CPT | Performed by: HOSPITALIST

## 2024-08-24 PROCEDURE — 97530 THERAPEUTIC ACTIVITIES: CPT | Performed by: PHYSICAL THERAPIST

## 2024-08-24 PROCEDURE — 25000003 PHARM REV CODE 250: Performed by: HOSPITALIST

## 2024-08-24 PROCEDURE — 97530 THERAPEUTIC ACTIVITIES: CPT

## 2024-08-24 PROCEDURE — 11000001 HC ACUTE MED/SURG PRIVATE ROOM

## 2024-08-24 PROCEDURE — 80048 BASIC METABOLIC PNL TOTAL CA: CPT | Performed by: HOSPITALIST

## 2024-08-24 PROCEDURE — 97165 OT EVAL LOW COMPLEX 30 MIN: CPT

## 2024-08-24 RX ADMIN — PANTOPRAZOLE SODIUM 8 MG/HR: 40 INJECTION, POWDER, FOR SOLUTION INTRAVENOUS at 02:08

## 2024-08-24 RX ADMIN — OXYCODONE AND ACETAMINOPHEN 1 TABLET: 10; 325 TABLET ORAL at 10:08

## 2024-08-24 RX ADMIN — MUPIROCIN: 20 OINTMENT TOPICAL at 09:08

## 2024-08-24 RX ADMIN — Medication 1 CAPSULE: at 08:08

## 2024-08-24 RX ADMIN — SEVELAMER CARBONATE 800 MG: 800 TABLET, FILM COATED ORAL at 04:08

## 2024-08-24 RX ADMIN — TAMSULOSIN HYDROCHLORIDE 0.8 MG: 0.4 CAPSULE ORAL at 08:08

## 2024-08-24 RX ADMIN — MUPIROCIN: 20 OINTMENT TOPICAL at 08:08

## 2024-08-24 RX ADMIN — ATORVASTATIN CALCIUM 80 MG: 40 TABLET, FILM COATED ORAL at 08:08

## 2024-08-24 RX ADMIN — PANTOPRAZOLE SODIUM 8 MG/HR: 40 INJECTION, POWDER, FOR SOLUTION INTRAVENOUS at 07:08

## 2024-08-24 RX ADMIN — FINASTERIDE 5 MG: 5 TABLET, FILM COATED ORAL at 08:08

## 2024-08-24 RX ADMIN — SEVELAMER CARBONATE 800 MG: 800 TABLET, FILM COATED ORAL at 08:08

## 2024-08-24 RX ADMIN — SEVELAMER CARBONATE 800 MG: 800 TABLET, FILM COATED ORAL at 11:08

## 2024-08-24 NOTE — PLAN OF CARE
Problem: Hemodialysis  Goal: Absence of Infection Signs and Symptoms  Outcome: Progressing     Problem: Adult Inpatient Plan of Care  Goal: Plan of Care Review  Outcome: Progressing     Problem: Adult Inpatient Plan of Care  Goal: Patient-Specific Goal (Individualized)  Outcome: Progressing     Problem: Adult Inpatient Plan of Care  Goal: Absence of Hospital-Acquired Illness or Injury  Outcome: Progressing     Problem: Adult Inpatient Plan of Care  Goal: Optimal Comfort and Wellbeing  Outcome: Progressing     Problem: Adult Inpatient Plan of Care  Goal: Readiness for Transition of Care  Outcome: Progressing     Problem: Diabetes Comorbidity  Goal: Blood Glucose Level Within Targeted Range  Outcome: Progressing     Problem: Wound  Goal: Improved Oral Intake  Outcome: Progressing     Problem: Wound  Goal: Absence of Infection Signs and Symptoms  Outcome: Progressing     Problem: Wound  Goal: Optimal Pain Control and Function  Outcome: Progressing     Problem: Wound  Goal: Skin Health and Integrity  Outcome: Progressing     Problem: Fall Injury Risk  Goal: Absence of Fall and Fall-Related Injury  Outcome: Progressing

## 2024-08-24 NOTE — PROGRESS NOTES
715 Q15 checks complete. Right groin dressing (gauze and tegaderm) is clean, dry and intact. No bleeding noted. Popliteal pulses assessed by doppler. Vitals are stable at this time and patient appears to be in no apparent distress.

## 2024-08-24 NOTE — NURSING
[facility], [facility]  Nurses Note -- 4 Eyes      8/23/2024       Skin assessed on: Q Shift      [] No Pressure Injuries Present    []Prevention Measures Documented    [x] Yes LDA  for Pressure Injury Previously documented     [] Yes New Pressure Injury Discovered   [] LDA for New Pressure Injury Added      Attending RN:  Irvin Velasquez RN     Second RN:  Frank Wilson RN

## 2024-08-24 NOTE — PT/OT/SLP EVAL
"Occupational Therapy   Evaluation    Name: Miguel Moore  MRN: [id number]  Admitting Diagnosis: GI bleed  Recent Surgery: Procedure(s) (LRB):  EGD (ESOPHAGOGASTRODUODENOSCOPY) (N/A) 3 Days Post-Op    Recommendations:     Discharge Recommendations: Low Intensity Therapy (w/ caregiver assistance)  Discharge Equipment Recommendations:  none  Barriers to discharge:  None    Assessment:     Miguel Moore is a 69 y.o. male with a medical diagnosis of GI bleed.   Performance deficits affecting function: weakness, impaired endurance, impaired self care skills, impaired functional mobility, impaired balance, decreased upper extremity function, decreased lower extremity function, decreased safety awareness, pain, decreased ROM.      Rehab Prognosis: Good; patient would benefit from acute skilled OT services to address these deficits and reach maximum level of function.       Plan:     Patient to be seen 3-5x/wk  to address the above listed problems via self-care/home management, therapeutic activities, therapeutic exercises  Plan of Care Expires: 09/07/24  Plan of Care Reviewed with: patient    Subjective     Chief Complaint: none stated   Patient/Family Comments/goals: "I am ready for ya'll now.'     Occupational Profile:  Living Environment: Pt lives w/ fiance and daughter in [facility] w/ [facility]   Previous level of function: Pt reports he required assist for ADLs form dtr as needed; pt was using the bed pan and performing wash-offs at bed level. Pt reports his grand-dtr assists w/ tranfers to his Lyft rides when going to dialysis; he completes stand pivots transfers to his W/C w/o an AD.   Roles and Routines: Pt states he was evaluated by PT prior to this admission.   Equipment Used at Home: bedside commode, wheelchair, shower chair, cane, straight, grab bar  Assistance upon Discharge: daughter    Pain/Comfort:  Pain Rating 1: 0/10  Pain Rating Post-Intervention 1: 0/10    Patients cultural, spiritual, Zoroastrian conflicts given " the current situation: no    Objective:     Communicated with: Nurse prior to session.  Patient found HOB elevated with telemetry, peripheral IV, pressure relief boots upon OT entry to room.    General Precautions: Standard, fall  Orthopedic Precautions: N/A; pt w/ hx of BLE wounds R is worse than L   Braces: B [employer] Shoes   Respiratory Status: Room air    Occupational Performance:    Bed Mobility:    Patient completed Scooting hips to EOB with contact guard assistance  Patient completed Supine to Sit with contact guard assistance  Patient completed Sit to Supine with contact guard assistance    Functional Mobility/Transfers:  Patient completed Sit <> Stand Transfer with moderate assistance and of 2 persons  with  use of bed rail      Activities of Daily Living:  Upper Body Dressing: moderate assistance for donning gown over back   Grooming: set-up assist for completing oral care     Cognitive/Visual Perceptual:  Cognitive/Psychosocial Skills:     -       Oriented to: Person, Place, Time, and Situation   -       Follows Commands/attention:Follows multistep  commands  -       Communication: clear/fluent  -       Memory: No Deficits noted  -       Safety awareness/insight to disability: intact     Physical Exam:  Balance:    -       good sitting balance; fair - standing   Postural examination/scapula alignment:    -       Rounded shoulders  -       Forward head  Skin integrity: B heel wounds and groin wound covered by dressing  Edema:  RUE forearm  Sensation:    -       Impaired  light/touch RUE  Dominant hand: RUE non functional 2* old CVA, uses left hand  Upper Extremity Range of Motion:     -       Right Upper Extremity: limited 2* stroke, minimal shoulder ROM, elbow and hand contracture  -       Left Upper Extremity: WFL  Upper Extremity Strength:    -       Right Upper Extremity: slight shoulder only  -       Left Upper Extremity: WFL   Strength:    -       Left Upper Extremity: WFL  Fine Motor Coordination:  intact in the left hand only    AMPAC 6 Click ADL:  AMPAC Total Score: 15    Treatment & Education:  -Eval completed.     Patient left HOB elevated with all lines intact, call button in reach, bed alarm on, and BLE pressure relief boots in place.     GOALS:   Multidisciplinary Problems       Occupational Therapy Goals          Problem: Occupational Therapy    Goal Priority Disciplines Outcome Interventions   Occupational Therapy Goal     OT, PT/OT Progressing    Description: Goals to be met by: 9/7/24     Patient will increase functional independence with ADLs by performing:    LE Dressing with Minimal Assistance.  Grooming while EOB with Modified [place].  Supine to sit with Modified [place].  Toilet transfer to bedside commode with Minimal Assistance.  Sit to stand transfer with Minimal Assistance.   Upper extremity exercise program x15 reps per handout, with assistance as needed.  Functional transfer: To Be Assessed                            History:     Past Medical History:   Diagnosis Date    Allergy     Clotting disorder     Elaquis and APlavix    Deep vein thrombosis     Diabetes mellitus, type 2     Hypertension     Nuclear sclerosis of both eyes 6/9/2017    Renal disorder     Seizures     Stroke     Urinary tract infection          Past Surgical History:   Procedure Laterality Date    CATARACT EXTRACTION W/  INTRAOCULAR LENS IMPLANT Right 10/05/2017    Dr. Tay    CATARACT EXTRACTION W/  INTRAOCULAR LENS IMPLANT Left 10/19/2017    Dr. Tay    CYSTOSCOPY W/ RETROGRADES Bilateral 2/18/2021    Procedure: CYSTOSCOPY, WITH RETROGRADE PYELOGRAM;  Surgeon: JEMMA Styles MD;  Location: [facility] OR;  Service: Urology;  Laterality: Bilateral;  REQUESTING EARLY AS POSSIBE-LO / 2/8/2021 @ 1:13PM  RN Pre Op 2-11-21, Covid NEGATIVE ON  2-17-21.  C A    ESOPHAGOGASTRODUODENOSCOPY N/A 8/17/2020    Procedure: EGD (ESOPHAGOGASTRODUODENOSCOPY);  Surgeon: Desmond Chapman MD;  Location: [facility] ENDO;   Service: Endoscopy;  Laterality: N/A;    ESOPHAGOGASTRODUODENOSCOPY N/A 8/21/2024    Procedure: EGD (ESOPHAGOGASTRODUODENOSCOPY);  Surgeon: Tonie Chauhan MD;  Location: [facility] ENDO;  Service: Endoscopy;  Laterality: N/A;    EYE SURGERY Bilateral     cataract    FEMORAL ARTERY STENT      FRACTURE SURGERY      INCISION AND DRAINAGE, LOWER EXTREMITY Right 4/4/2024    Procedure: INCISION AND DRAINAGE, LOWER EXTREMITY;  Surgeon: Jimbo Montilla III, MD;  Location: [facility] OR;  Service: Orthopedics;  Laterality: Right;    INCISION AND DRAINAGE, LOWER EXTREMITY Right 4/6/2024    Procedure: INCISION AND DRAINAGE, LOWER EXTREMITY;  Surgeon: Desmond Barillas MD;  Location: [facility] OR;  Service: Orthopedics;  Laterality: Right;  foot and ankle    INCISION AND DRAINAGE, LOWER EXTREMITY Right 4/9/2024    Procedure: INCISION AND DRAINAGE, LOWER EXTREMITY- FOOT & ANKLE;  Surgeon: Jimbo Montilla III, MD;  Location: [facility] OR;  Service: Orthopedics;  Laterality: Right;  CULTURES, VASCHE    INCISION AND DRAINAGE, LOWER EXTREMITY Right 5/21/2024    Procedure: INCISION AND DRAINAGE, LOWER EXTREMITY;  Surgeon: Jimbo Montilla III, MD;  Location: [facility] OR;  Service: Orthopedics;  Laterality: Right;  Pulsavac and Vashe    KNEE SURGERY      bilateral scope    WOUND DRESSING Right 4/9/2024    Procedure: WOUND VAC EXCHANGE;  Surgeon: Jimbo Montilla III, MD;  Location: [facility] OR;  Service: Orthopedics;  Laterality: Right;       Time Tracking:     OT Date of Treatment: 08/24/24  OT Start Time: 1052  OT Stop Time: 1117  OT Total Time (min): 25 min    Billable Minutes:Evaluation 15  Self Care/Home Management 10  Total Time 25    8/24/2024

## 2024-08-24 NOTE — ASSESSMENT & PLAN NOTE
Anemia is likely due to acute blood loss which was from GI bleed and chronic disease due to ESRD. Most recent hemoglobin and hematocrit are listed below.  Recent Labs     08/22/24  1829 08/23/24  0625 08/23/24 2028   HGB 7.2* 6.1* 8.0*   HCT 21.3* 18.0* 24.7*       Plan  Transfused 3 units of blood with adequate correction of H/H.  Continue iron replacement.

## 2024-08-24 NOTE — ANESTHESIA POSTPROCEDURE EVALUATION
Anesthesia Post Evaluation    Patient: Miguel Moore    Procedure(s) Performed: Procedure(s) (LRB):  EGD (ESOPHAGOGASTRODUODENOSCOPY) (N/A)    Final Anesthesia Type: general      Patient location during evaluation: PACU  Patient participation: Yes- Able to Participate  Level of consciousness: awake and alert and oriented  Post-procedure vital signs: reviewed and stable  Pain management: adequate  Airway patency: patent    PONV status at discharge: No PONV  Anesthetic complications: no      Cardiovascular status: blood pressure returned to baseline, hemodynamically stable and stable  Respiratory status: unassisted, spontaneous ventilation and room air  Hydration status: euvolemic  Follow-up not needed.              Vitals Value Taken Time   /63 08/24/24 1130   Temp 36.9 °C (98.5 °F) 08/24/24 1130   Pulse 86 08/24/24 1230   Resp 18 08/24/24 1130   SpO2 98 % 08/24/24 1245         No case tracking events are documented in the log.      Pain/Skyler Score: Pain Rating Prior to Med Admin: 6 (8/23/2024 11:40 PM)  Pain Rating Post Med Admin: 0 (8/24/2024  7:14 AM)  Skyler Score: 10 (8/23/2024  6:45 PM)

## 2024-08-24 NOTE — PROGRESS NOTES
Awake alert oriented NAD    Denies CNS ENT CP GI  RHEUM OR DERM SX  Past Medical History:   Diagnosis Date    Allergy     Clotting disorder     Elaquis and APlavix    Deep vein thrombosis     Diabetes mellitus, type 2     Hypertension     Nuclear sclerosis of both eyes 6/9/2017    Renal disorder     Seizures     Stroke     Urinary tract infection      Past Surgical History:   Procedure Laterality Date    CATARACT EXTRACTION W/  INTRAOCULAR LENS IMPLANT Right 10/05/2017    Dr. Tay    CATARACT EXTRACTION W/  INTRAOCULAR LENS IMPLANT Left 10/19/2017    Dr. Tay    CYSTOSCOPY W/ RETROGRADES Bilateral 2/18/2021    Procedure: CYSTOSCOPY, WITH RETROGRADE PYELOGRAM;  Surgeon: JEMMA Styles MD;  Location: [facility] OR;  Service: Urology;  Laterality: Bilateral;  REQUESTING EARLY AS POSSIBE-LO / 2/8/2021 @ 1:13PM  RN Pre Op 2-11-21, Covid NEGATIVE ON  2-17-21.  C A    ESOPHAGOGASTRODUODENOSCOPY N/A 8/17/2020    Procedure: EGD (ESOPHAGOGASTRODUODENOSCOPY);  Surgeon: Desmond Chapman MD;  Location: [facility];  Service: Endoscopy;  Laterality: N/A;    ESOPHAGOGASTRODUODENOSCOPY N/A 8/21/2024    Procedure: EGD (ESOPHAGOGASTRODUODENOSCOPY);  Surgeon: Tonie Chauhan MD;  Location: [facility] ENDO;  Service: Endoscopy;  Laterality: N/A;    EYE SURGERY Bilateral     cataract    FEMORAL ARTERY STENT      FRACTURE SURGERY      INCISION AND DRAINAGE, LOWER EXTREMITY Right 4/4/2024    Procedure: INCISION AND DRAINAGE, LOWER EXTREMITY;  Surgeon: Jimbo Montilla III, MD;  Location: [facility] OR;  Service: Orthopedics;  Laterality: Right;    INCISION AND DRAINAGE, LOWER EXTREMITY Right 4/6/2024    Procedure: INCISION AND DRAINAGE, LOWER EXTREMITY;  Surgeon: Desmond Barillas MD;  Location: [facility] OR;  Service: Orthopedics;  Laterality: Right;  foot and ankle    INCISION AND DRAINAGE, LOWER EXTREMITY Right 4/9/2024    Procedure: INCISION AND DRAINAGE, LOWER EXTREMITY- FOOT & ANKLE;  Surgeon: Jimbo Montilla III, MD;  Location: [facility]  OR;  Service: Orthopedics;  Laterality: Right;  CULTURES, VASCHE    INCISION AND DRAINAGE, LOWER EXTREMITY Right 5/21/2024    Procedure: INCISION AND DRAINAGE, LOWER EXTREMITY;  Surgeon: Jimbo Montilla III, MD;  Location: [facility] OR;  Service: Orthopedics;  Laterality: Right;  Pulsavac and Vashe    KNEE SURGERY      bilateral scope    WOUND DRESSING Right 4/9/2024    Procedure: WOUND VAC EXCHANGE;  Surgeon: Jimbo Montilla III, MD;  Location: [facility] OR;  Service: Orthopedics;  Laterality: Right;     Review of patient's allergies indicates:   Allergen Reactions    Ace inhibitors      Hyperkalemia 8/2018  Other reaction(s): Unknown    Penicillins Hives     Tolerated cefepime and cefdinir previously    Tizanidine      Felt hot       Current Facility-Administered Medications   Medication    0.9%  NaCl infusion (for blood administration)    acetaminophen tablet 650 mg    atorvastatin tablet 80 mg    dextrose 10% bolus 125 mL 125 mL    dextrose 10% bolus 250 mL 250 mL    epoetin marisol-epbx injection 10,000 Units    finasteride tablet 5 mg    hydrALAZINE injection 10 mg    mupirocin 2 % ointment    ondansetron injection 8 mg    oxyCODONE-acetaminophen  mg per tablet 1 tablet    polyethylene glycol packet 17 g    sevelamer carbonate tablet 800 mg    tamsulosin 24 hr capsule 0.8 mg    vitamin renal formula (B-complex-vitamin c-folic acid) 1 mg per capsule 1 capsule       LABS    Recent Results (from the past 24 hour(s))   CBC auto differential    Collection Time: 08/23/24  8:28 PM   Result Value Ref Range    WBC 5.48 3.90 - 12.70 K/uL    RBC 2.91 (L) 4.60 - 6.20 M/uL    Hemoglobin 8.0 (L) 14.0 - 18.0 g/dL    Hematocrit 24.7 (L) 40.0 - 54.0 %    MCV 85 82 - 98 fL    MCH 27.5 27.0 - 31.0 pg    MCHC 32.4 32.0 - 36.0 g/dL    RDW 17.2 (H) 11.5 - 14.5 %    Platelets 165 150 - 450 K/uL    MPV 9.0 (L) 9.2 - 12.9 fL    Immature Granulocytes 0.4 0.0 - 0.5 %    Gran # (ANC) 4.3 1.8 - 7.7 K/uL    Immature Grans (Abs) 0.02 0.00 -  0.04 K/uL    Lymph # 0.7 (L) 1.0 - 4.8 K/uL    Mono # 0.4 0.3 - 1.0 K/uL    Eos # 0.1 0.0 - 0.5 K/uL    Baso # 0.03 0.00 - 0.20 K/uL    nRBC 0 0 /100 WBC    Gran % 78.9 (H) 38.0 - 73.0 %    Lymph % 11.9 (L) 18.0 - 48.0 %    Mono % 6.8 4.0 - 15.0 %    Eosinophil % 1.5 0.0 - 8.0 %    Basophil % 0.5 0.0 - 1.9 %    Differential Method Automated    Basic metabolic panel    Collection Time: 08/24/24  6:57 AM   Result Value Ref Range    Sodium 138 136 - 145 mmol/L    Potassium 3.9 3.5 - 5.1 mmol/L    Chloride 105 95 - 110 mmol/L    CO2 23 23 - 29 mmol/L    Glucose 106 70 - 110 mg/dL    BUN 44 (H) 8 - 23 mg/dL    Creatinine 5.3 (H) 0.5 - 1.4 mg/dL    Calcium 8.1 (L) 8.7 - 10.5 mg/dL    Anion Gap 10 8 - 16 mmol/L    eGFR 11 (A) >60 mL/min/1.73 m^2   CBC Without Differential    Collection Time: 08/24/24  6:57 AM   Result Value Ref Range    WBC 4.96 3.90 - 12.70 K/uL    RBC 2.75 (L) 4.60 - 6.20 M/uL    Hemoglobin 7.9 (L) 14.0 - 18.0 g/dL    Hematocrit 23.8 (L) 40.0 - 54.0 %    MCV 87 82 - 98 fL    MCH 28.7 27.0 - 31.0 pg    MCHC 33.2 32.0 - 36.0 g/dL    RDW 18.0 (H) 11.5 - 14.5 %    Platelets 175 150 - 450 K/uL    MPV 9.6 9.2 - 12.9 fL   ]    I/O last 3 completed shifts:  In: 419 [Blood:419]  Out: 1686 [Other:1686]    Vitals:    08/24/24 1130 08/24/24 1200 08/24/24 1230 08/24/24 1245   BP: (!) 144/63      Pulse: 86  86    Resp: 18      Temp: 98.5 °F (36.9 °C)      TempSrc: Oral      SpO2: 100% 99%  98%   Weight:       Height:           No Jvd, Thyromegaly or Lymphadenopathy  Lungs: Fairly clear anteriorly and laterally  Cor: RRR no G or rubs  Abd: Soft benign good bowel sounds non tender  Ext: No E C C    A)  ESRD  PAF   UGIB  2nd hyperpth  Hypoalb  Pad  Sz dx    P)    Renal Diet  Home meds  Protect access  HD MWF  EPO  prn   Binders prn   Adjust all meds to the degree of renal fx  Close follow up I/O and weights  Maintain Hydration

## 2024-08-24 NOTE — PLAN OF CARE
Problem: Occupational Therapy  Goal: Occupational Therapy Goal  Description: Goals to be met by: 9/7/24     Patient will increase functional independence with ADLs by performing:    LE Dressing with Minimal Assistance.  Grooming while EOB with Modified Melissa.  Supine to sit with Modified Melissa.  Toilet transfer to bedside commode with Minimal Assistance.  Sit to stand transfer with Minimal Assistance.   Upper extremity exercise program x15 reps per handout, with assistance as needed.  Functional transfer: To Be Assessed       Outcome: Progressing

## 2024-08-24 NOTE — PROGRESS NOTES
[facility] Medicine  Progress Note    Patient Name: Miguel Moore  MRN: [id number]  Patient Class: IP- Inpatient   Admission Date: 8/21/2024  Length of Stay: 3 days  Attending Physician: Bonifacio Reyes MD  Primary Care Provider: Raciel Raymond MD        Subjective:     Principal Problem:GI bleed        HPI:  70 y/o male with a PMHx of DVT, PAF on Eliquis, DMT2, HTN, seizures, stroke, presents to the ER for evaluation of coffee ground emesis and darks stool that started early this morning.  Patient reports 2 episodes of coffee ground emesis and 1 episode of dark stool.  Also reports associated abdominal pain which has now resolved.  No alleviating or aggravating factors.  Patient has not taken his Eliquis today.  He presented to ER where he was noted to be tachycardic.  Labs showing Hgb much lower than baseline.  He denies any fever or chills.  No other complaints.    Overview/Hospital Course:  70 y/o male with a PMHx of DVT, PAF on Eliquis, DMT2, HTN, ESRD, stroke admitted with GI bleed.  Hgb of 5.9, tachycardic and with active bleeding and admitted to ICU.  Started on Protonix drip.  GI consulted.  Nephrology consulted for HD.  Patient was taken for EGD that showed gastric ulcer with oozing hemorrhage, treated with a clip.  Patient was transfused 3 units of blood with good correction of H/H.  Eliquis held.  Will need to continue IV Protonix for 72 hours.until tomorrow.  Wound Care consulted for chronic LE wounds. H/H dropped and he was transfused pRBC. CTA abdomen/pelvis with no active hemorrhage but there is mucosal enhancement of gastric fundus. IR consulted and did angiogram that did not erveal active contrast extravasation but due to ongoing bleeding, elective coil embolization of left gastric artery was done on 8/23/24.       Interval History: no acute issues overnight, he is doing well on clear liquids and would like to advance. No bm today. Pending cbc    Review of  Systems  Objective:     Vital Signs (Most Recent):  Temp: 98.5 °F (36.9 °C) (08/24/24 1130)  Pulse: 86 (08/24/24 1130)  Resp: 18 (08/24/24 1130)  BP: (!) 144/63 (08/24/24 1130)  SpO2: 100 % (08/24/24 1130) Vital Signs (24h Range):  Temp:  [97.5 °F (36.4 °C)-98.5 °F (36.9 °C)] 98.5 °F (36.9 °C)  Pulse:  [66-86] 86  Resp:  [8-22] 18  SpO2:  [98 %-100 %] 100 %  BP: (129-192)/(55-78) 144/63     Weight: 78 kg (171 lb 15.3 oz)  Body mass index is 26.15 kg/m².    Intake/Output Summary (Last 24 hours) at 8/24/2024 1158  Last data filed at 8/24/2024 0839  Gross per 24 hour   Intake 658.95 ml   Output 1686 ml   Net -1027.05 ml         Physical Exam  Vitals and nursing note reviewed.   Constitutional:       General: He is not in acute distress.     Appearance: He is ill-appearing.   Cardiovascular:      Rate and Rhythm: Normal rate and regular rhythm.      Pulses: Normal pulses.   Pulmonary:      Effort: Pulmonary effort is normal. No respiratory distress.   Abdominal:      General: Bowel sounds are normal. There is no distension.   Musculoskeletal:      Comments: B/l lower extremity wounds, dressing intact   Skin:     General: Skin is warm.   Neurological:      General: No focal deficit present.      Mental Status: He is alert and oriented to person, place, and time.   Psychiatric:         Mood and Affect: Mood normal.             Significant Labs: All pertinent labs within the past 24 hours have been reviewed.    Significant Imaging: I have reviewed all pertinent imaging results/findings within the past 24 hours.    Assessment/Plan:      * GI bleed  Patient has acute blood loss due to hemorrhage, the hemorrhage is due to gastrointestinal bleed, patient does have a propensity for bleeding due to a medication, the medication is Eliquis.. Will trend hemoglobin/hematocrit Every 8 hours, as well as monitor and correct for any coagulation defects. CBC and vital signs have been reviewed and last CBC was noted-   Lab Results    Component Value Date    WBC 5.48 08/23/2024    HGB 8.0 (L) 08/23/2024    HCT 24.7 (L) 08/23/2024    MCV 85 08/23/2024     08/23/2024   Patient has been started on GI bleed pathway orders.  GI consulted and patient has been started on Protonix drip.  Eliquis held.  Transfused 3 units of blood with adequate correction of H/H.  EGD showing gastric ulcer with oozing hemorrhage.  Treated with clip.  Continue IV Protonix for 72 hours.  Continue to monitor H/H and holding Eliquis.  Started on clear liquid diet.    Hb then dropped again. CTA without active extravasation but did show concerns for oozing. IR consulted and patient underwent left gastric artery coil embolization.   HH is drped today,will transfuse pack of RBC and do CTA abdomen,pelvic,,      Acute blood loss anemia (ABLA)  - due to acute GI bleed now s/p left gastric arter coil embolization  - pending repeat CBC  - will plan to transfuse if Hb <7      Wounds, multiple  Stage 4 right plantar foot wound POA  Stage 4 right mid plantar foot wound POA  Unstageable right lateral heel wound POA  Unstageable left medial heel wound POA      Paroxysmal atrial fibrillation  Patient with Paroxysmal (<7 days) atrial fibrillation which is controlled. Patient is currently in sinus rhythm.[id number] Score: 3  Hold home Eliquis with GI bleed.    Anemia in chronic kidney disease  Anemia is likely due to acute blood loss which was from GI bleed and chronic disease due to ESRD. Most recent hemoglobin and hematocrit are listed below.  Recent Labs     08/22/24  1829 08/23/24  0625 08/23/24 2028   HGB 7.2* 6.1* 8.0*   HCT 21.3* 18.0* 24.7*       Plan  Transfused 3 units of blood with adequate correction of H/H.  Continue iron replacement.    Chronic deep vein thrombosis (DVT) of popliteal vein of right lower extremity 9/21/20 outside [place]; unprovoked; anticoagulation  Hold Eliquis with GI bleed.      Type 2 diabetes mellitus with diabetic neuropathy, with long-term current  "use of insulin  Patient's FSGs are controlled on current medication regimen.  Last A1c reviewed-   Lab Results   Component Value Date    HGBA1C 5.2 06/24/2024     Most recent fingerstick glucose reviewed- No results for input(s): "POCTGLUCOSE" in the last 24 hours.  Current correctional scale  Medium  Maintain anti-hyperglycemic dose as follows-   Antihyperglycemics (From admission, onward)      None          Hold Oral hypoglycemics while patient is in the hospital.    ESRD (end stage renal disease)  Creatine stable for now. BMP reviewed- noted Estimated Creatinine Clearance: 8.1 mL/min (A) (based on SCr of 8.2 mg/dL (H)). according to latest data. Based on current GFR, CKD stage is end stage.  Monitor UOP and serial BMP and adjust therapy as needed. Renally dose meds. Avoid nephrotoxic medications and procedures.  Nephrology consulted for HD.    Hemiplegia and hemiparesis following cerebral infarction affecting right dominant side  Stable       BPH (benign prostatic hyperplasia) 2/18/21 prostate bx normal  Continue home medications.      Dyslipidemia  Continue statin      Benign essential HTN  Chronic, controlled. Latest blood pressure and vitals reviewed-     Temp:  [97.3 °F (36.3 °C)-98.6 °F (37 °C)]   Pulse:  []   Resp:  [5-24]   BP: (106-158)/(50-70)   SpO2:  [95 %-100 %] .   No Bp medications on home med rec.  Low Na diet once advanced.  Will utilize p.r.n. blood pressure medication only if patient's blood pressure greater than 180/110 and he develops symptoms such as worsening chest pain or shortness of breath.      VTE Risk Mitigation (From admission, onward)           Ordered     IP VTE HIGH RISK PATIENT  Once         08/21/24 0957     Place sequential compression device  Until discontinued         08/21/24 0957                    Discharge Planning   GARY:      Code Status: Full Code   Is the patient medically ready for discharge?:     Reason for patient still in hospital (select all that apply): " Treatment  Discharge Plan A: Home Health                  Bonifacio Reyes MD  Department of Hospital Medicine   [facility] - Telemetry

## 2024-08-24 NOTE — SUBJECTIVE & OBJECTIVE
Interval History: no acute issues overnight, he is doing well on clear liquids and would like to advance. No bm today. Pending cbc    Review of Systems  Objective:     Vital Signs (Most Recent):  Temp: 98.5 °F (36.9 °C) (08/24/24 1130)  Pulse: 86 (08/24/24 1130)  Resp: 18 (08/24/24 1130)  BP: (!) 144/63 (08/24/24 1130)  SpO2: 100 % (08/24/24 1130) Vital Signs (24h Range):  Temp:  [97.5 °F (36.4 °C)-98.5 °F (36.9 °C)] 98.5 °F (36.9 °C)  Pulse:  [66-86] 86  Resp:  [8-22] 18  SpO2:  [98 %-100 %] 100 %  BP: (129-192)/(55-78) 144/63     Weight: 78 kg (171 lb 15.3 oz)  Body mass index is 26.15 kg/m².    Intake/Output Summary (Last 24 hours) at 8/24/2024 1158  Last data filed at 8/24/2024 0839  Gross per 24 hour   Intake 658.95 ml   Output 1686 ml   Net -1027.05 ml         Physical Exam  Vitals and nursing note reviewed.   Constitutional:       General: He is not in acute distress.     Appearance: He is ill-appearing.   Cardiovascular:      Rate and Rhythm: Normal rate and regular rhythm.      Pulses: Normal pulses.   Pulmonary:      Effort: Pulmonary effort is normal. No respiratory distress.   Abdominal:      General: Bowel sounds are normal. There is no distension.   Musculoskeletal:      Comments: B/l lower extremity wounds, dressing intact   Skin:     General: Skin is warm.   Neurological:      General: No focal deficit present.      Mental Status: He is alert and oriented to person, place, and time.   Psychiatric:         Mood and Affect: Mood normal.             Significant Labs: All pertinent labs within the past 24 hours have been reviewed.    Significant Imaging: I have reviewed all pertinent imaging results/findings within the past 24 hours.

## 2024-08-24 NOTE — ASSESSMENT & PLAN NOTE
- due to acute GI bleed now s/p left gastric arter coil embolization  - pending repeat CBC  - will plan to transfuse if Hb <7

## 2024-08-24 NOTE — NURSING
[facility] [place]urses [place] -- 4 Eyes      8/24/2024       Skin assessed on: Q Shift      [x] No Pressure Injuries Present    [x]Prevention Measures Documented    [] Yes LDA  for Pressure Injury Previously documented     [] Yes New Pressure Injury Discovered   [] LDA for New Pressure Injury Added      Attending RN:  Peri Dias RN     Second RN:  Ron

## 2024-08-24 NOTE — PT/OT/SLP EVAL
Physical Therapy Evaluation    Patient Name:  Miguel Moore   MRN:  [id number]    Recommendations:     Discharge Recommendations: Low Intensity Therapy   Discharge Equipment Recommendations: none   Barriers to discharge:  Trending medical condition    Assessment:     Miguel Moore is a 69 y.o. male admitted with a medical diagnosis of GI bleed.  He presents with the following impairments/functional limitations: weakness, impaired endurance, impaired self care skills, impaired functional mobility, gait instability, decreased safety awareness, pain, decreased lower extremity function, decreased upper extremity function, decreased coordination, decreased ROM, impaired fine motor, impaired coordination, impaired skin, impaired muscle length, impaired joint extensibility Pt demonstrates fairly good bed mobility w/ set up assist. Pt transfers supine<>sit w/ CGAx1. Pt trasnfers sit<>stand w/ mod Ax1 and HHA. Pt stood for approx 2 min w/ HHA WB on L LE. Pt transfers back to supine with set up assist, able to scoot himself up in bed. Pt tolerates mobility assessment well today..    Rehab Prognosis: Good; patient would benefit from acute skilled PT services to address these deficits and reach maximum level of function.    Recent Surgery: Procedure(s) (LRB):  EGD (ESOPHAGOGASTRODUODENOSCOPY) (N/A) 3 Days Post-Op    Plan:     During this hospitalization, patient to be seen 5 x/week to address the identified rehab impairments via therapeutic activities, therapeutic exercises, gait training, wheelchair management/training, neuromuscular re-education and progress toward the following goals:    Plan of Care Expires:  09/07/24    Subjective     Chief Complaint: fatigue  Patient/Family Comments/goals: improve strength/ mobility  Pain/Comfort:  Pain Rating 1: 0/10  Pain Rating Post-Intervention 1: 0/10    Patients cultural, spiritual, Restoration conflicts given the current situation: no    Living Environment:  Pt lives w/ fiance and  daughter in [facility] w/ 2 [place]  Prior to admission, patients level of function was mostly bed bound but was able to perform bed to w/c transfers to get to dialysis.  Equipment used at home: bedside commode, wheelchair, shower chair, cane, straight, hospital bed, grab bar.  DME owned (not currently used): none.  Upon discharge, patient will have assistance from his fiance, daughter and granddaughter..    Objective:     Communicated with nsg prior to session.  Patient found HOB elevated with telemetry, peripheral IV, pressure relief boots  upon PT entry to room.    General Precautions: Standard, fall  Orthopedic Precautions:N/A   Braces:  (pt has dorco shoes at home)  Respiratory Status: Room air    Exams:  Cognitive Exam:  Patient is oriented to Person, Place, Time, and Situation  RLE ROM: Deficits: min restriction into knee extension  RLE Strength: Deficits: grossly 3+/5  LLE ROM: WFL  LLE Strength: WFL    Functional Mobility:  Bed Mobility:     Rolling Left:  stand by assistance  Scooting: stand by assistance  Bridging: stand by assistance  Supine to Sit: contact guard assistance  Sit to Supine: contact guard assistance  Transfers:     Sit to Stand:  moderate assistance and of 2 persons with hand-held assist  Balance: good in sitting, poor in standing      AM-PAC 6 CLICK MOBILITY  Total Score:12       Treatment & Education:  Pt educated on importance of bed mobility and safety w/ transfers    Patient left HOB elevated with all lines intact, call button in reach, and nsg notified.    GOALS:   Multidisciplinary Problems       Physical Therapy Goals          Problem: Physical Therapy    Goal Priority Disciplines Outcome Goal Variances Interventions   Physical Therapy Goal     PT, PT/OT Progressing     Description: Goals to be met by: 24     Patient will increase functional independence with mobility by performin. Supine to sit with Contact Guard Assistance  2. Sit to supine with Contact Guard Assistance  3.  Rolling to Left and Right with Supervision.  4. Sit to stand transfer with Contact Guard Assistance  5. Bed to chair transfer with Contact Guard Assistance using Rolling Walker                         History:     Past Medical History:   Diagnosis Date    Allergy     Clotting disorder     Elaquis and APlavix    Deep vein thrombosis     Diabetes mellitus, type 2     Hypertension     Nuclear sclerosis of both eyes 6/9/2017    Renal disorder     Seizures     Stroke     Urinary tract infection        Past Surgical History:   Procedure Laterality Date    CATARACT EXTRACTION W/  INTRAOCULAR LENS IMPLANT Right 10/05/2017    Dr. Tay    CATARACT EXTRACTION W/  INTRAOCULAR LENS IMPLANT Left 10/19/2017    Dr. Tay    CYSTOSCOPY W/ RETROGRADES Bilateral 2/18/2021    Procedure: CYSTOSCOPY, WITH RETROGRADE PYELOGRAM;  Surgeon: JEMMA Styles MD;  Location: [facility] OR;  Service: Urology;  Laterality: Bilateral;  REQUESTING EARLY AS POSSIBE-LO / 2/8/2021 @ 1:13PM  RN Pre Op 2-11-21, Covid NEGATIVE ON  2-17-21.  C A    ESOPHAGOGASTRODUODENOSCOPY N/A 8/17/2020    Procedure: EGD (ESOPHAGOGASTRODUODENOSCOPY);  Surgeon: Desmond Chapman MD;  Location: [facility];  Service: Endoscopy;  Laterality: N/A;    ESOPHAGOGASTRODUODENOSCOPY N/A 8/21/2024    Procedure: EGD (ESOPHAGOGASTRODUODENOSCOPY);  Surgeon: Tonie Chauhan MD;  Location: [facility];  Service: Endoscopy;  Laterality: N/A;    EYE SURGERY Bilateral     cataract    FEMORAL ARTERY STENT      FRACTURE SURGERY      INCISION AND DRAINAGE, LOWER EXTREMITY Right 4/4/2024    Procedure: INCISION AND DRAINAGE, LOWER EXTREMITY;  Surgeon: Jimbo Montilla III, MD;  Location: [facility] OR;  Service: Orthopedics;  Laterality: Right;    INCISION AND DRAINAGE, LOWER EXTREMITY Right 4/6/2024    Procedure: INCISION AND DRAINAGE, LOWER EXTREMITY;  Surgeon: Desmond Barillas MD;  Location: [facility] OR;  Service: Orthopedics;  Laterality: Right;  foot and ankle    INCISION AND DRAINAGE, LOWER  EXTREMITY Right 4/9/2024    Procedure: INCISION AND DRAINAGE, LOWER EXTREMITY- FOOT & ANKLE;  Surgeon: Jimbo Montilla III, MD;  Location: [facility] OR;  Service: Orthopedics;  Laterality: Right;  CULTURES, VASCHE    INCISION AND DRAINAGE, LOWER EXTREMITY Right 5/21/2024    Procedure: INCISION AND DRAINAGE, LOWER EXTREMITY;  Surgeon: Jimbo Montilla III, MD;  Location: [facility] OR;  Service: Orthopedics;  Laterality: Right;  Pulsavac and Vashe    KNEE SURGERY      bilateral scope    WOUND DRESSING Right 4/9/2024    Procedure: WOUND VAC EXCHANGE;  Surgeon: Jimbo Montilla III, MD;  Location: [facility] OR;  Service: Orthopedics;  Laterality: Right;       Time Tracking:     PT Received On: 08/24/24  PT Start Time: 1052     PT Stop Time: 1117  PT Total Time (min): 25 min     Billable Minutes: Evaluation 10 and Therapeutic Activity 15    (Co-treat w/ OT)    08/24/2024

## 2024-08-24 NOTE — PLAN OF CARE
Problem: Hemodialysis  Goal: Safe, Effective Therapy Delivery  Outcome: Progressing  Goal: Absence of Infection Signs and Symptoms  Outcome: Progressing     Problem: Adult Inpatient Plan of Care  Goal: Optimal Comfort and Wellbeing  Outcome: Progressing     Problem: Gastrointestinal Bleeding  Goal: Optimal Coping with Acute Illness  Outcome: Progressing

## 2024-08-24 NOTE — ASSESSMENT & PLAN NOTE
Patient has acute blood loss due to hemorrhage, the hemorrhage is due to gastrointestinal bleed, patient does have a propensity for bleeding due to a medication, the medication is Eliquis.. Will trend hemoglobin/hematocrit Every 8 hours, as well as monitor and correct for any coagulation defects. CBC and vital signs have been reviewed and last CBC was noted-   Lab Results   Component Value Date    WBC 5.48 08/23/2024    HGB 8.0 (L) 08/23/2024    HCT 24.7 (L) 08/23/2024    MCV 85 08/23/2024     08/23/2024   Patient has been started on GI bleed pathway orders.  GI consulted and patient has been started on Protonix drip.  Eliquis held.  Transfused 3 units of blood with adequate correction of H/H.  EGD showing gastric ulcer with oozing hemorrhage.  Treated with clip.  Continue IV Protonix for 72 hours.  Continue to monitor H/H and holding Eliquis.  Started on clear liquid diet.    Hb then dropped again. CTA without active extravasation but did show concerns for oozing. IR consulted and patient underwent left gastric artery coil embolization.   HH is drped today,will transfuse pack of RBC and do CTA abdomen,pelvic,,

## 2024-08-24 NOTE — PLAN OF CARE
Problem: Physical Therapy  Goal: Physical Therapy Goal  Description: Goals to be met by: 24     Patient will increase functional independence with mobility by performin. Supine to sit with Contact Guard Assistance  2. Sit to supine with Contact Guard Assistance  3. Rolling to Left and Right with Supervision.  4. Sit to stand transfer with Contact Guard Assistance  5. Bed to chair transfer with Contact Guard Assistance using Rolling Walker    Outcome: Progressing     PT eval completed today. Pt demonstrates fairly good bed mobility w/ set up assist. Pt transfers supine<>sit w/ CGAx1. Pt trasnfers sit<>stand w/ mod Ax1 and HHA. Pt stood for approx 2 min w/ HHA WB on L LE. Pt transfers back to supine with set up assist, able to scoot himself up in bed. Pt tolerates mobility assessment well today. Pt would benefit from skilled therapy to maximize functional independence and dec caregiver burden. Recommending low intensity therapy at d/c, no DME needs at this time.

## 2024-08-25 LAB
BASOPHILS # BLD AUTO: 0.03 K/UL (ref 0–0.2)
BASOPHILS NFR BLD: 0.5 % (ref 0–1.9)
BLD PROD TYP BPU: NORMAL
BLOOD UNIT EXPIRATION DATE: NORMAL
BLOOD UNIT TYPE CODE: 6200
BLOOD UNIT TYPE: NORMAL
CODING SYSTEM: NORMAL
CROSSMATCH INTERPRETATION: NORMAL
DIFFERENTIAL METHOD BLD: ABNORMAL
DISPENSE STATUS: NORMAL
EOSINOPHIL # BLD AUTO: 0.3 K/UL (ref 0–0.5)
EOSINOPHIL NFR BLD: 4.7 % (ref 0–8)
ERYTHROCYTE [DISTWIDTH] IN BLOOD BY AUTOMATED COUNT: 17.8 % (ref 11.5–14.5)
HCT VFR BLD AUTO: 24.2 % (ref 40–54)
HGB BLD-MCNC: 7.7 G/DL (ref 14–18)
IMM GRANULOCYTES # BLD AUTO: 0.02 K/UL (ref 0–0.04)
IMM GRANULOCYTES NFR BLD AUTO: 0.4 % (ref 0–0.5)
LYMPHOCYTES # BLD AUTO: 1.6 K/UL (ref 1–4.8)
LYMPHOCYTES NFR BLD: 28.3 % (ref 18–48)
MCH RBC QN AUTO: 27.5 PG (ref 27–31)
MCHC RBC AUTO-ENTMCNC: 31.8 G/DL (ref 32–36)
MCV RBC AUTO: 86 FL (ref 82–98)
MONOCYTES # BLD AUTO: 0.6 K/UL (ref 0.3–1)
MONOCYTES NFR BLD: 10.4 % (ref 4–15)
NEUTROPHILS # BLD AUTO: 3.1 K/UL (ref 1.8–7.7)
NEUTROPHILS NFR BLD: 55.7 % (ref 38–73)
NRBC BLD-RTO: 0 /100 WBC
NUM UNITS TRANS PACKED RBC: NORMAL
NUM UNITS TRANS PACKED RBC: NORMAL
PLATELET # BLD AUTO: 159 K/UL (ref 150–450)
PMV BLD AUTO: 9.2 FL (ref 9.2–12.9)
RBC # BLD AUTO: 2.8 M/UL (ref 4.6–6.2)
TRANS ERYTHROCYTES VOL PATIENT: NORMAL ML
TRANS ERYTHROCYTES VOL PATIENT: NORMAL ML
WBC # BLD AUTO: 5.58 K/UL (ref 3.9–12.7)

## 2024-08-25 PROCEDURE — 85025 COMPLETE CBC W/AUTO DIFF WBC: CPT | Performed by: HOSPITALIST

## 2024-08-25 PROCEDURE — 25000003 PHARM REV CODE 250: Performed by: HOSPITALIST

## 2024-08-25 PROCEDURE — 11000001 HC ACUTE MED/SURG PRIVATE ROOM

## 2024-08-25 PROCEDURE — 36415 COLL VENOUS BLD VENIPUNCTURE: CPT | Performed by: HOSPITALIST

## 2024-08-25 RX ORDER — PANTOPRAZOLE SODIUM 40 MG/1
40 TABLET, DELAYED RELEASE ORAL 2 TIMES DAILY
Status: DISCONTINUED | OUTPATIENT
Start: 2024-08-25 | End: 2024-08-27

## 2024-08-25 RX ADMIN — MUPIROCIN: 20 OINTMENT TOPICAL at 08:08

## 2024-08-25 RX ADMIN — Medication 1 CAPSULE: at 08:08

## 2024-08-25 RX ADMIN — FINASTERIDE 5 MG: 5 TABLET, FILM COATED ORAL at 08:08

## 2024-08-25 RX ADMIN — ATORVASTATIN CALCIUM 80 MG: 40 TABLET, FILM COATED ORAL at 08:08

## 2024-08-25 RX ADMIN — PANTOPRAZOLE SODIUM 40 MG: 40 TABLET, DELAYED RELEASE ORAL at 08:08

## 2024-08-25 RX ADMIN — TAMSULOSIN HYDROCHLORIDE 0.8 MG: 0.4 CAPSULE ORAL at 08:08

## 2024-08-25 RX ADMIN — SEVELAMER CARBONATE 800 MG: 800 TABLET, FILM COATED ORAL at 08:08

## 2024-08-25 RX ADMIN — SEVELAMER CARBONATE 800 MG: 800 TABLET, FILM COATED ORAL at 12:08

## 2024-08-25 RX ADMIN — SEVELAMER CARBONATE 800 MG: 800 TABLET, FILM COATED ORAL at 04:08

## 2024-08-25 NOTE — ASSESSMENT & PLAN NOTE
-Admitted to inpatient status  -Presented with GI bleeding  -Has required at least 3 units PRBC an has been treated with PPI drip x 72 hours.  Eliquis has been held.  -GI consulted and input appreciated  -Taken for EGD which showed gastric ulcer with oozing hemorrhage which was treated with clip.    -H/H continued to drop so CTA GI bleeding protocol obtained which showed no active extravasation but did show concerns for oozing. IR consulted and patient underwent left gastric artery coil embolization.   -Today he is feeling well overall, but anxious about if/when to resume his blood thinner.  He reports a black bowel movement today and Hb is relatively stable at 7.7.  -Start PPI q12h which he will need for 8 weeks.  Monitor CBC in AM and if Hb stable may be able to go home with home health.  Will need to clarify with GI when OK to resume blood thinners.

## 2024-08-25 NOTE — ASSESSMENT & PLAN NOTE
-BP is normal to slightly elevated  -Home med list has no anti-hypertensive medications  -Continue to monitor for now.

## 2024-08-25 NOTE — ASSESSMENT & PLAN NOTE
-Stage 4 right plantar foot wound POA  -Stage 4 right mid plantar foot wound POA  -Unstageable right lateral heel wound POA  -Unstageable left medial heel wound POA

## 2024-08-25 NOTE — SUBJECTIVE & OBJECTIVE
Interval History: No acute events overnight.  Reports had black bowel movement this morning.  Overall is feeling better and tolerating diet.  He is anxious about going home wondering when to resume his blood thinning medications.  All questions answered and patient had no further complaints.    Objective:     Vital Signs (Most Recent):  Temp: 98.5 °F (36.9 °C) (08/25/24 1109)  Pulse: 89 (08/25/24 1109)  Resp: 18 (08/25/24 1109)  BP: (!) 140/65 (08/25/24 1109)  SpO2: 99 % (08/25/24 1109) Vital Signs (24h Range):  Temp:  [97.3 °F (36.3 °C)-98.5 °F (36.9 °C)] 98.5 °F (36.9 °C)  Pulse:  [73-89] 89  Resp:  [18] 18  SpO2:  [95 %-100 %] 99 %  BP: (135-154)/(60-66) 140/65     Weight: 78 kg (171 lb 15.3 oz)  Body mass index is 26.15 kg/m².  No intake or output data in the 24 hours ending 08/25/24 1151        Physical Exam  Vitals and nursing note reviewed.   Constitutional:       General: He is not in acute distress.     Appearance: He is ill-appearing.   HENT:      Mouth/Throat:      Mouth: Mucous membranes are moist.   Eyes:      Extraocular Movements: Extraocular movements intact.   Cardiovascular:      Rate and Rhythm: Normal rate and regular rhythm.      Pulses: Normal pulses.   Pulmonary:      Effort: Pulmonary effort is normal. No respiratory distress.   Abdominal:      General: Bowel sounds are normal. There is no distension.   Musculoskeletal:      Cervical back: Normal range of motion.      Comments: B/l lower extremity wounds, dressing intact   Skin:     General: Skin is warm.   Neurological:      General: No focal deficit present.      Mental Status: He is alert and oriented to person, place, and time.   Psychiatric:         Mood and Affect: Mood normal.             Significant Labs: All pertinent labs within the past 24 hours have been reviewed.    Significant Imaging: I have reviewed all pertinent imaging results/findings within the past 24 hours.

## 2024-08-25 NOTE — PROGRESS NOTES
Awake alert oriented NAD    Seems stable watching the [employer] on TV    Denies CNS ENT CP GI  RHEUM OR DERM SX  Past Medical History:   Diagnosis Date    Allergy     Clotting disorder     Elaquis and APlavix    Deep vein thrombosis     Diabetes mellitus, type 2     Hypertension     Nuclear sclerosis of both eyes 6/9/2017    Renal disorder     Seizures     Stroke     Urinary tract infection      Past Surgical History:   Procedure Laterality Date    CATARACT EXTRACTION W/  INTRAOCULAR LENS IMPLANT Right 10/05/2017    Dr. Tay    CATARACT EXTRACTION W/  INTRAOCULAR LENS IMPLANT Left 10/19/2017    Dr. Tay    CYSTOSCOPY W/ RETROGRADES Bilateral 2/18/2021    Procedure: CYSTOSCOPY, WITH RETROGRADE PYELOGRAM;  Surgeon: JEMMA Styles MD;  Location: [facility] OR;  Service: Urology;  Laterality: Bilateral;  REQUESTING EARLY AS POSSIBE-LO / 2/8/2021 @ 1:13PM  RN Pre Op 2-11-21, Covid NEGATIVE ON  2-17-21.  C A    ESOPHAGOGASTRODUODENOSCOPY N/A 8/17/2020    Procedure: EGD (ESOPHAGOGASTRODUODENOSCOPY);  Surgeon: Desmond Chapman MD;  Location: [facility];  Service: Endoscopy;  Laterality: N/A;    ESOPHAGOGASTRODUODENOSCOPY N/A 8/21/2024    Procedure: EGD (ESOPHAGOGASTRODUODENOSCOPY);  Surgeon: Tonie Chauhan MD;  Location: [facility];  Service: Endoscopy;  Laterality: N/A;    EYE SURGERY Bilateral     cataract    FEMORAL ARTERY STENT      FRACTURE SURGERY      INCISION AND DRAINAGE, LOWER EXTREMITY Right 4/4/2024    Procedure: INCISION AND DRAINAGE, LOWER EXTREMITY;  Surgeon: Jimbo Montilla III, MD;  Location: [facility] OR;  Service: Orthopedics;  Laterality: Right;    INCISION AND DRAINAGE, LOWER EXTREMITY Right 4/6/2024    Procedure: INCISION AND DRAINAGE, LOWER EXTREMITY;  Surgeon: Desmond Barillas MD;  Location: [facility] OR;  Service: Orthopedics;  Laterality: Right;  foot and ankle    INCISION AND DRAINAGE, LOWER EXTREMITY Right 4/9/2024    Procedure: INCISION AND DRAINAGE, LOWER EXTREMITY- FOOT & ANKLE;  Surgeon:  Jimbo Montilla III, MD;  Location: [facility] OR;  Service: Orthopedics;  Laterality: Right;  CULTURES, VASCHE    INCISION AND DRAINAGE, LOWER EXTREMITY Right 5/21/2024    Procedure: INCISION AND DRAINAGE, LOWER EXTREMITY;  Surgeon: Jimbo Montilla III, MD;  Location: [facility] OR;  Service: Orthopedics;  Laterality: Right;  Pulsavac and Vashe    KNEE SURGERY      bilateral scope    WOUND DRESSING Right 4/9/2024    Procedure: WOUND VAC EXCHANGE;  Surgeon: Jimbo Mnotilla III, MD;  Location: [facility] OR;  Service: Orthopedics;  Laterality: Right;     Review of patient's allergies indicates:   Allergen Reactions    Ace inhibitors      Hyperkalemia 8/2018  Other reaction(s): Unknown    Penicillins Hives     Tolerated cefepime and cefdinir previously    Tizanidine      Felt hot       Current Facility-Administered Medications   Medication    0.9%  NaCl infusion (for blood administration)    acetaminophen tablet 650 mg    atorvastatin tablet 80 mg    dextrose 10% bolus 125 mL 125 mL    dextrose 10% bolus 250 mL 250 mL    epoetin marisol-epbx injection 10,000 Units    finasteride tablet 5 mg    hydrALAZINE injection 10 mg    mupirocin 2 % ointment    ondansetron injection 8 mg    oxyCODONE-acetaminophen  mg per tablet 1 tablet    pantoprazole EC tablet 40 mg    polyethylene glycol packet 17 g    sevelamer carbonate tablet 800 mg    tamsulosin 24 hr capsule 0.8 mg    vitamin renal formula (B-complex-vitamin c-folic acid) 1 mg per capsule 1 capsule       LABS    Recent Results (from the past 24 hour(s))   CBC auto differential    Collection Time: 08/24/24  6:12 PM   Result Value Ref Range    WBC 5.68 3.90 - 12.70 K/uL    RBC 2.90 (L) 4.60 - 6.20 M/uL    Hemoglobin 8.0 (L) 14.0 - 18.0 g/dL    Hematocrit 24.9 (L) 40.0 - 54.0 %    MCV 86 82 - 98 fL    MCH 27.6 27.0 - 31.0 pg    MCHC 32.1 32.0 - 36.0 g/dL    RDW 17.4 (H) 11.5 - 14.5 %    Platelets 173 150 - 450 K/uL    MPV 9.2 9.2 - 12.9 fL    Immature Granulocytes 0.4 0.0 - 0.5 %     Gran # (ANC) 3.9 1.8 - 7.7 K/uL    Immature Grans (Abs) 0.02 0.00 - 0.04 K/uL    Lymph # 1.0 1.0 - 4.8 K/uL    Mono # 0.5 0.3 - 1.0 K/uL    Eos # 0.2 0.0 - 0.5 K/uL    Baso # 0.04 0.00 - 0.20 K/uL    nRBC 0 0 /100 WBC    Gran % 67.8 38.0 - 73.0 %    Lymph % 18.3 18.0 - 48.0 %    Mono % 9.5 4.0 - 15.0 %    Eosinophil % 3.3 0.0 - 8.0 %    Basophil % 0.7 0.0 - 1.9 %    Differential Method Automated    CBC Auto Differential    Collection Time: 08/25/24  7:55 AM   Result Value Ref Range    WBC 5.58 3.90 - 12.70 K/uL    RBC 2.80 (L) 4.60 - 6.20 M/uL    Hemoglobin 7.7 (L) 14.0 - 18.0 g/dL    Hematocrit 24.2 (L) 40.0 - 54.0 %    MCV 86 82 - 98 fL    MCH 27.5 27.0 - 31.0 pg    MCHC 31.8 (L) 32.0 - 36.0 g/dL    RDW 17.8 (H) 11.5 - 14.5 %    Platelets 159 150 - 450 K/uL    MPV 9.2 9.2 - 12.9 fL    Immature Granulocytes 0.4 0.0 - 0.5 %    Gran # (ANC) 3.1 1.8 - 7.7 K/uL    Immature Grans (Abs) 0.02 0.00 - 0.04 K/uL    Lymph # 1.6 1.0 - 4.8 K/uL    Mono # 0.6 0.3 - 1.0 K/uL    Eos # 0.3 0.0 - 0.5 K/uL    Baso # 0.03 0.00 - 0.20 K/uL    nRBC 0 0 /100 WBC    Gran % 55.7 38.0 - 73.0 %    Lymph % 28.3 18.0 - 48.0 %    Mono % 10.4 4.0 - 15.0 %    Eosinophil % 4.7 0.0 - 8.0 %    Basophil % 0.5 0.0 - 1.9 %    Differential Method Automated    ]    I/O last 3 completed shifts:  In: 240 [P.O.:240]  Out: -     Vitals:    08/25/24 0704 08/25/24 0737 08/25/24 1109 08/25/24 1213   BP:  (!) 143/66 (!) 140/65    Pulse: 78 79 89 81   Resp:  18 18    Temp:  98.5 °F (36.9 °C) 98.5 °F (36.9 °C)    TempSrc:  Oral Oral    SpO2:  98% 99%    Weight:       Height:           No Jvd, Thyromegaly or Lymphadenopathy  Lungs: Fairly clear anteriorly and laterally  Cor: RRR no G or rubs  Abd: Soft benign good bowel sounds non tender  Ext: No E C C    A)  ESRD HD MWF   PAF   UGIB  2nd hyperpth  Hypoalb  Pad  Sz dx    P)    Renal Diet  Home meds  Protect access  HD MWF  EPO  prn   Binders prn   Adjust all meds to the degree of renal fx  Close follow up I/O and  weights  Maintain Hydration

## 2024-08-25 NOTE — ASSESSMENT & PLAN NOTE
-Baseline anemia is chronic due to ESRD and acute drop is due to acute blood loss from GI bleeding  -Treatment as above.

## 2024-08-25 NOTE — PROGRESS NOTES
[facility] Medicine  Progress Note    Patient Name: Miguel Moore  MRN: [id number]  Patient Class: IP- Inpatient   Admission Date: 8/21/2024  Length of Stay: 4 days  Attending Physician: Clement Wolfe MD  Primary Care Provider: Raciel Raymond MD        Subjective:     Principal Problem:GI bleed        HPI:  68 y/o male with a PMHx of DVT, PAF on Eliquis, DMT2, HTN, seizures, stroke, presents to the ER for evaluation of coffee ground emesis and darks stool that started early this morning.  Patient reports 2 episodes of coffee ground emesis and 1 episode of dark stool.  Also reports associated abdominal pain which has now resolved.  No alleviating or aggravating factors.  Patient has not taken his Eliquis today.  He presented to ER where he was noted to be tachycardic.  Labs showing Hgb much lower than baseline.  He denies any fever or chills.  No other complaints.    Overview/Hospital Course:  68 y/o male with a PMHx of DVT, PAF on Eliquis, DMT2, HTN, ESRD, stroke admitted with GI bleed.  Hgb of 5.9, tachycardic and with active bleeding and admitted to ICU.  Started on Protonix drip.  GI consulted.  Nephrology consulted for HD.  Patient was taken for EGD that showed gastric ulcer with oozing hemorrhage, treated with a clip.  Patient was transfused 3 units of blood with good correction of H/H.  Eliquis held.  Will need to continue IV Protonix for 72 hours.until tomorrow.  Wound Care consulted for chronic LE wounds. H/H dropped and he was transfused pRBC. CTA abdomen/pelvis with no active hemorrhage but there is mucosal enhancement of gastric fundus. IR consulted and did angiogram that did not erveal active contrast extravasation but due to ongoing bleeding, elective coil embolization of left gastric artery was done on 8/23/24.       Interval History: No acute events overnight.  Reports had black bowel movement this morning.  Overall is feeling better and tolerating diet.  He is anxious about  going home wondering when to resume his blood thinning medications.  All questions answered and patient had no further complaints.    Objective:     Vital Signs (Most Recent):  Temp: 98.5 °F (36.9 °C) (08/25/24 1109)  Pulse: 89 (08/25/24 1109)  Resp: 18 (08/25/24 1109)  BP: (!) 140/65 (08/25/24 1109)  SpO2: 99 % (08/25/24 1109) Vital Signs (24h Range):  Temp:  [97.3 °F (36.3 °C)-98.5 °F (36.9 °C)] 98.5 °F (36.9 °C)  Pulse:  [73-89] 89  Resp:  [18] 18  SpO2:  [95 %-100 %] 99 %  BP: (135-154)/(60-66) 140/65     Weight: 78 kg (171 lb 15.3 oz)  Body mass index is 26.15 kg/m².  No intake or output data in the 24 hours ending 08/25/24 1151        Physical Exam  Vitals and nursing note reviewed.   Constitutional:       General: He is not in acute distress.     Appearance: He is ill-appearing.   HENT:      Mouth/Throat:      Mouth: Mucous membranes are moist.   Eyes:      Extraocular Movements: Extraocular movements intact.   Cardiovascular:      Rate and Rhythm: Normal rate and regular rhythm.      Pulses: Normal pulses.   Pulmonary:      Effort: Pulmonary effort is normal. No respiratory distress.   Abdominal:      General: Bowel sounds are normal. There is no distension.   Musculoskeletal:      Cervical back: Normal range of motion.      Comments: B/l lower extremity wounds, dressing intact   Skin:     General: Skin is warm.   Neurological:      General: No focal deficit present.      Mental Status: He is alert and oriented to person, place, and time.   Psychiatric:         Mood and Affect: Mood normal.             Significant Labs: All pertinent labs within the past 24 hours have been reviewed.    Significant Imaging: I have reviewed all pertinent imaging results/findings within the past 24 hours.    Assessment/Plan:      * GI bleed  -Admitted to inpatient status  -Presented with GI bleeding  -Has required at least 3 units PRBC an has been treated with PPI drip x 72 hours.  Eliquis has been held.  -GI consulted and  input appreciated  -Taken for EGD which showed gastric ulcer with oozing hemorrhage which was treated with clip.    -H/H continued to drop so CTA GI bleeding protocol obtained which showed no active extravasation but did show concerns for oozing. IR consulted and patient underwent left gastric artery coil embolization.   -Today he is feeling well overall, but anxious about if/when to resume his blood thinner.  He reports a black bowel movement today and Hb is relatively stable at 7.7.  -Start PPI q12h which he will need for 8 weeks.  Monitor CBC in AM and if Hb stable may be able to go home with home health.  Will need to clarify with GI when OK to resume blood thinners.    Acute blood loss anemia (ABLA)  -Treatment as above.    Anemia in chronic kidney disease  -Baseline anemia is chronic due to ESRD and acute drop is due to acute blood loss from GI bleeding  -Treatment as above.    Gastric ulcer with hemorrhage  -Treatment as above.    ESRD (end stage renal disease)  -Noted ESRD and on HD outpatient MWF  -Nephrology consulted  -Continue HD per nephrology    Wounds, multiple  -Stage 4 right plantar foot wound POA  -Stage 4 right mid plantar foot wound POA  -Unstageable right lateral heel wound POA  -Unstageable left medial heel wound POA    Paroxysmal atrial fibrillation  -Patient with Paroxysmal (<7 days) atrial fibrillation which is controlled.   -Patient is currently in sinus rhythm.[id number] Score: 3  -Continue to hold home eliquis due to GI bleeding    Chronic deep vein thrombosis (DVT) of popliteal vein of right lower extremity 9/21/20 outside [place]; unprovoked; anticoagulation  -Noted history  -Holding eliquis due to GI bleeding    Hemiplegia and hemiparesis following cerebral infarction affecting right dominant side  -History noted  -PT/OT consulted and recommend home health at discharge    BPH (benign prostatic hyperplasia) 2/18/21 prostate bx normal  -Continue tamsulosin and  finasteride    Dyslipidemia  -Continue statin    Benign essential HTN  -BP is normal to slightly elevated  -Home med list has no anti-hypertensive medications  -Continue to monitor for now.      VTE Risk Mitigation (From admission, onward)           Ordered     IP VTE HIGH RISK PATIENT  Once         08/21/24 0957     Place sequential compression device  Until discontinued         08/21/24 0957                    Discharge Planning   GARY:      Code Status: Full Code   Is the patient medically ready for discharge?:     Reason for patient still in hospital (select all that apply): Treatment  Discharge Plan A: Home Health                  Clement Wolfe MD  Department of Hospital Medicine   [facility] - Telemetry

## 2024-08-25 NOTE — PLAN OF CARE
Problem: Hemodialysis  Goal: Safe, Effective Therapy Delivery  Outcome: Progressing  Goal: Effective Tissue Perfusion  Outcome: Progressing  Goal: Absence of Infection Signs and Symptoms  Outcome: Progressing     Problem: Adult Inpatient Plan of Care  Goal: Plan of Care Review  Outcome: Progressing  Goal: Patient-Specific Goal (Individualized)  Outcome: Progressing  Goal: Absence of Hospital-Acquired Illness or Injury  Outcome: Progressing  Goal: Optimal Comfort and Wellbeing  Outcome: Progressing  Goal: Readiness for Transition of Care  Outcome: Progressing     Problem: Diabetes Comorbidity  Goal: Blood Glucose Level Within Targeted Range  Outcome: Progressing     Problem: Wound  Goal: Optimal Coping  Outcome: Progressing  Goal: Optimal Functional Ability  Outcome: Progressing  Goal: Absence of Infection Signs and Symptoms  Outcome: Progressing  Goal: Improved Oral Intake  Outcome: Progressing  Goal: Optimal Pain Control and Function  Outcome: Progressing  Goal: Skin Health and Integrity  Outcome: Progressing  Goal: Optimal Wound Healing  Outcome: Progressing     Problem: Skin Injury Risk Increased  Goal: Skin Health and Integrity  Outcome: Progressing     Problem: Fall Injury Risk  Goal: Absence of Fall and Fall-Related Injury  Outcome: Progressing

## 2024-08-25 NOTE — ASSESSMENT & PLAN NOTE
-Patient with Paroxysmal (<7 days) atrial fibrillation which is controlled.   -Patient is currently in sinus rhythm.[id number] Score: 3  -Continue to hold home eliquis due to GI bleeding

## 2024-08-26 ENCOUNTER — TELEPHONE (OUTPATIENT)
Dept: ENDOSCOPY | Facility: HOSPITAL | Age: 69
End: 2024-08-26
Payer: MEDICARE

## 2024-08-26 LAB
ANION GAP SERPL CALC-SCNC: 13 MMOL/L (ref 8–16)
BASOPHILS # BLD AUTO: 0.02 K/UL (ref 0–0.2)
BASOPHILS NFR BLD: 0.4 % (ref 0–1.9)
BUN SERPL-MCNC: 60 MG/DL (ref 8–23)
CALCIUM SERPL-MCNC: 8 MG/DL (ref 8.7–10.5)
CHLORIDE SERPL-SCNC: 102 MMOL/L (ref 95–110)
CO2 SERPL-SCNC: 22 MMOL/L (ref 23–29)
CREAT SERPL-MCNC: 8.5 MG/DL (ref 0.5–1.4)
DIFFERENTIAL METHOD BLD: ABNORMAL
EOSINOPHIL # BLD AUTO: 0.2 K/UL (ref 0–0.5)
EOSINOPHIL NFR BLD: 4.7 % (ref 0–8)
ERYTHROCYTE [DISTWIDTH] IN BLOOD BY AUTOMATED COUNT: 17.4 % (ref 11.5–14.5)
EST. GFR  (NO RACE VARIABLE): 6 ML/MIN/1.73 M^2
GLUCOSE SERPL-MCNC: 105 MG/DL (ref 70–110)
HCT VFR BLD AUTO: 22.8 % (ref 40–54)
HGB BLD-MCNC: 7.7 G/DL (ref 14–18)
IMM GRANULOCYTES # BLD AUTO: 0.02 K/UL (ref 0–0.04)
IMM GRANULOCYTES NFR BLD AUTO: 0.4 % (ref 0–0.5)
LYMPHOCYTES # BLD AUTO: 1 K/UL (ref 1–4.8)
LYMPHOCYTES NFR BLD: 19.8 % (ref 18–48)
MCH RBC QN AUTO: 29.1 PG (ref 27–31)
MCHC RBC AUTO-ENTMCNC: 33.8 G/DL (ref 32–36)
MCV RBC AUTO: 86 FL (ref 82–98)
MONOCYTES # BLD AUTO: 0.4 K/UL (ref 0.3–1)
MONOCYTES NFR BLD: 7.9 % (ref 4–15)
NEUTROPHILS # BLD AUTO: 3.4 K/UL (ref 1.8–7.7)
NEUTROPHILS NFR BLD: 66.8 % (ref 38–73)
NRBC BLD-RTO: 0 /100 WBC
PLATELET # BLD AUTO: 158 K/UL (ref 150–450)
PMV BLD AUTO: 9.3 FL (ref 9.2–12.9)
POTASSIUM SERPL-SCNC: 4.2 MMOL/L (ref 3.5–5.1)
RBC # BLD AUTO: 2.65 M/UL (ref 4.6–6.2)
SODIUM SERPL-SCNC: 137 MMOL/L (ref 136–145)
WBC # BLD AUTO: 5.09 K/UL (ref 3.9–12.7)

## 2024-08-26 PROCEDURE — 63600175 PHARM REV CODE 636 W HCPCS: Mod: JZ,JG | Performed by: STUDENT IN AN ORGANIZED HEALTH CARE EDUCATION/TRAINING PROGRAM

## 2024-08-26 PROCEDURE — 36415 COLL VENOUS BLD VENIPUNCTURE: CPT | Performed by: HOSPITALIST

## 2024-08-26 PROCEDURE — 80100016 HC MAINTENANCE HEMODIALYSIS

## 2024-08-26 PROCEDURE — 25000003 PHARM REV CODE 250: Performed by: STUDENT IN AN ORGANIZED HEALTH CARE EDUCATION/TRAINING PROGRAM

## 2024-08-26 PROCEDURE — 25000003 PHARM REV CODE 250: Performed by: HOSPITALIST

## 2024-08-26 PROCEDURE — 97530 THERAPEUTIC ACTIVITIES: CPT

## 2024-08-26 PROCEDURE — 80048 BASIC METABOLIC PNL TOTAL CA: CPT | Performed by: HOSPITALIST

## 2024-08-26 PROCEDURE — 11000001 HC ACUTE MED/SURG PRIVATE ROOM

## 2024-08-26 PROCEDURE — 85025 COMPLETE CBC W/AUTO DIFF WBC: CPT | Performed by: HOSPITALIST

## 2024-08-26 PROCEDURE — 97535 SELF CARE MNGMENT TRAINING: CPT

## 2024-08-26 RX ORDER — OMADACYCLINE 150 MG/1
TABLET, FILM COATED ORAL
COMMUNITY
Start: 2024-08-20

## 2024-08-26 RX ADMIN — ATORVASTATIN CALCIUM 80 MG: 40 TABLET, FILM COATED ORAL at 08:08

## 2024-08-26 RX ADMIN — TAMSULOSIN HYDROCHLORIDE 0.8 MG: 0.4 CAPSULE ORAL at 08:08

## 2024-08-26 RX ADMIN — SEVELAMER CARBONATE 800 MG: 800 TABLET, FILM COATED ORAL at 04:08

## 2024-08-26 RX ADMIN — APIXABAN 5 MG: 5 TABLET, FILM COATED ORAL at 09:08

## 2024-08-26 RX ADMIN — APIXABAN 5 MG: 5 TABLET, FILM COATED ORAL at 08:08

## 2024-08-26 RX ADMIN — EPOETIN ALFA-EPBX 10000 UNITS: 10000 INJECTION, SOLUTION INTRAVENOUS; SUBCUTANEOUS at 08:08

## 2024-08-26 RX ADMIN — SEVELAMER CARBONATE 800 MG: 800 TABLET, FILM COATED ORAL at 08:08

## 2024-08-26 RX ADMIN — PANTOPRAZOLE SODIUM 40 MG: 40 TABLET, DELAYED RELEASE ORAL at 08:08

## 2024-08-26 RX ADMIN — Medication 1 CAPSULE: at 08:08

## 2024-08-26 RX ADMIN — FINASTERIDE 5 MG: 5 TABLET, FILM COATED ORAL at 08:08

## 2024-08-26 RX ADMIN — PANTOPRAZOLE SODIUM 40 MG: 40 TABLET, DELAYED RELEASE ORAL at 09:08

## 2024-08-26 NOTE — TELEPHONE ENCOUNTER
Patient is currently admitted into hospital will contact to schedule egd/colonoscopy procedure once discharged.

## 2024-08-26 NOTE — ASSESSMENT & PLAN NOTE
-Patient with Paroxysmal (<7 days) atrial fibrillation which is controlled.   -Patient is currently in sinus rhythm.[id number] Score: 3  -resume eliquis

## 2024-08-26 NOTE — PROGRESS NOTES
[facility] Medicine  Progress Note    Patient Name: Miguel Moore  MRN: [id number]  Patient Class: IP- Inpatient   Admission Date: 8/21/2024  Length of Stay: 5 days  Attending Physician: Bonifacio Reyes MD  Primary Care Provider: Raciel Raymond MD        Subjective:     Principal Problem:GI bleed        HPI:  68 y/o male with a PMHx of DVT, PAF on Eliquis, DMT2, HTN, seizures, stroke, presents to the ER for evaluation of coffee ground emesis and darks stool that started early this morning.  Patient reports 2 episodes of coffee ground emesis and 1 episode of dark stool.  Also reports associated abdominal pain which has now resolved.  No alleviating or aggravating factors.  Patient has not taken his Eliquis today.  He presented to ER where he was noted to be tachycardic.  Labs showing Hgb much lower than baseline.  He denies any fever or chills.  No other complaints.    Overview/Hospital Course:  68 y/o male with a PMHx of DVT, PAF on Eliquis, DMT2, HTN, ESRD, stroke admitted with GI bleed.  Hgb of 5.9, tachycardic and with active bleeding and admitted to ICU.  Started on Protonix drip.  GI consulted.  Nephrology consulted for HD.  Patient was taken for EGD that showed gastric ulcer with oozing hemorrhage, treated with a clip.  Patient was transfused 3 units of blood with good correction of H/H.  Eliquis held.  Will need to continue IV Protonix for 72 hours.until tomorrow.  Wound Care consulted for chronic LE wounds. H/H dropped and he was transfused pRBC. CTA abdomen/pelvis with no active hemorrhage but there is mucosal enhancement of gastric fundus. IR consulted and did angiogram that did not erveal active contrast extravasation but due to ongoing bleeding, elective coil embolization of left gastric artery was done on 8/23/24.       Interval History: Hb stable, patient reports dark black stool today still and small amount of red blood. He denies any other issues and BP has been stable along  with CBC. Discussed with GI, plan to restart eliquis today    Review of Systems  Objective:     Vital Signs (Most Recent):  Temp: 97.8 °F (36.6 °C) (08/26/24 0748)  Pulse: 77 (08/26/24 0748)  Resp: 18 (08/26/24 0748)  BP: (!) 141/64 (08/26/24 0748)  SpO2: 98 % (08/26/24 0748) Vital Signs (24h Range):  Temp:  [97.8 °F (36.6 °C)-98.5 °F (36.9 °C)] 97.8 °F (36.6 °C)  Pulse:  [69-89] 77  Resp:  [18] 18  SpO2:  [97 %-99 %] 98 %  BP: (135-152)/(63-67) 141/64     Weight: 80.5 kg (177 lb 7.5 oz)  Body mass index is 26.98 kg/m².    Intake/Output Summary (Last 24 hours) at 8/26/2024 1055  Last data filed at 8/26/2024 0934  Gross per 24 hour   Intake 480 ml   Output --   Net 480 ml         Physical Exam  Vitals and nursing note reviewed.   Constitutional:       General: He is not in acute distress.     Appearance: He is ill-appearing.   Cardiovascular:      Rate and Rhythm: Normal rate and regular rhythm.      Pulses: Normal pulses.   Pulmonary:      Effort: Pulmonary effort is normal. No respiratory distress.   Abdominal:      General: Bowel sounds are normal. There is no distension.   Musculoskeletal:      Comments: B/l lower extremity wounds, dressing intact   Skin:     General: Skin is warm.   Neurological:      General: No focal deficit present.      Mental Status: He is alert and oriented to person, place, and time.   Psychiatric:         Mood and Affect: Mood normal.             Significant Labs: All pertinent labs within the past 24 hours have been reviewed.    Significant Imaging: I have reviewed all pertinent imaging results/findings within the past 24 hours.    Assessment/Plan:      * GI bleed  -Admitted to inpatient status  -Presented with GI bleeding  -Has required at least 3 units PRBC an has been treated with PPI drip x 72 hours.  Eliquis has been held.  -GI consulted and input appreciated  -Taken for EGD which showed gastric ulcer with oozing hemorrhage which was treated with clip.    -H/H continued to drop so  CTA GI bleeding protocol obtained which showed no active extravasation but did show concerns for oozing. IR consulted and patient underwent left gastric artery coil embolization.   -Today he is feeling well overall, but anxious about if/when to resume his blood thinner.  He reports a black bowel movement today and Hb is relatively stable at 7.7.  -Start PPI q12h which he will need for 8 weeks.  Monitor CBC in AM and if Hb stable may be able to go home with home health.    -Resume eliquis today and monitor over next 24 hours    Gastric ulcer with hemorrhage  -Treatment as above.    Acute blood loss anemia (ABLA)  -Treatment as above.    Wounds, multiple  -Stage 4 right plantar foot wound POA  -Stage 4 right mid plantar foot wound POA  -Unstageable right lateral heel wound POA  -Unstageable left medial heel wound POA    Paroxysmal atrial fibrillation  -Patient with Paroxysmal (<7 days) atrial fibrillation which is controlled.   -Patient is currently in sinus rhythm.[id number] Score: 3  -resume eliquis    Anemia in chronic kidney disease  -Baseline anemia is chronic due to ESRD and acute drop is due to acute blood loss from GI bleeding  -Treatment as above.    Chronic deep vein thrombosis (DVT) of popliteal vein of right lower extremity 9/21/20 outside [place]; unprovoked; anticoagulation  -Noted history  -resume eliquis    ESRD (end stage renal disease)  -Noted ESRD and on HD outpatient MWF  -Nephrology consulted  -Continue HD per nephrology    Hemiplegia and hemiparesis following cerebral infarction affecting right dominant side  -History noted  -PT/OT consulted and recommend home health at discharge    BPH (benign prostatic hyperplasia) 2/18/21 prostate bx normal  -Continue tamsulosin and finasteride    Dyslipidemia  -Continue statin    Benign essential HTN  -BP is normal to slightly elevated  -Home med list has no anti-hypertensive medications  -Continue to monitor for now.      VTE Risk Mitigation (From admission,  onward)           Ordered     apixaban tablet 5 mg  2 times daily         08/26/24 0756     IP VTE HIGH RISK PATIENT  Once         08/21/24 0957     Place sequential compression device  Until discontinued         08/21/24 0957                    Discharge Planning   GARY:      Code Status: Full Code   Is the patient medically ready for discharge?:     Reason for patient still in hospital (select all that apply): Treatment  Discharge Plan A: Home Health                  Bonifacio Reyes MD  Department of [facility] Medicine   [facility]

## 2024-08-26 NOTE — PLAN OF CARE
Problem: Occupational Therapy  Goal: Occupational Therapy Goal  Description: Goals to be met by: 9/7/24     Patient will increase functional independence with ADLs by performing:    LE Dressing with Minimal Assistance.  Grooming while EOB with Modified [place].  Supine to sit with Modified [place].  Toilet transfer to bedside commode with Minimal Assistance.  Sit to stand transfer with Minimal Assistance.   Upper extremity exercise program x15 reps per handout, with assistance as needed.  Functional transfer: To Be Assessed       Outcome: Progressing

## 2024-08-26 NOTE — SUBJECTIVE & OBJECTIVE
Interval History: Hb stable, patient reports dark black stool today still and small amount of red blood. He denies any other issues and BP has been stable along with CBC. Discussed with GI, plan to restart eliquis today    Review of Systems  Objective:     Vital Signs (Most Recent):  Temp: 97.8 °F (36.6 °C) (08/26/24 0748)  Pulse: 77 (08/26/24 0748)  Resp: 18 (08/26/24 0748)  BP: (!) 141/64 (08/26/24 0748)  SpO2: 98 % (08/26/24 0748) Vital Signs (24h Range):  Temp:  [97.8 °F (36.6 °C)-98.5 °F (36.9 °C)] 97.8 °F (36.6 °C)  Pulse:  [69-89] 77  Resp:  [18] 18  SpO2:  [97 %-99 %] 98 %  BP: (135-152)/(63-67) 141/64     Weight: 80.5 kg (177 lb 7.5 oz)  Body mass index is 26.98 kg/m².    Intake/Output Summary (Last 24 hours) at 8/26/2024 1055  Last data filed at 8/26/2024 0934  Gross per 24 hour   Intake 480 ml   Output --   Net 480 ml         Physical Exam  Vitals and nursing note reviewed.   Constitutional:       General: He is not in acute distress.     Appearance: He is ill-appearing.   Cardiovascular:      Rate and Rhythm: Normal rate and regular rhythm.      Pulses: Normal pulses.   Pulmonary:      Effort: Pulmonary effort is normal. No respiratory distress.   Abdominal:      General: Bowel sounds are normal. There is no distension.   Musculoskeletal:      Comments: B/l lower extremity wounds, dressing intact   Skin:     General: Skin is warm.   Neurological:      General: No focal deficit present.      Mental Status: He is alert and oriented to person, place, and time.   Psychiatric:         Mood and Affect: Mood normal.             Significant Labs: All pertinent labs within the past 24 hours have been reviewed.    Significant Imaging: I have reviewed all pertinent imaging results/findings within the past 24 hours.

## 2024-08-26 NOTE — NURSING
[facility] [place]  Nurses Note -- 4 Eyes      8/26/2024       Skin assessed on: Q Shift      [] No Pressure Injuries Present    []Prevention Measures Documented    [x] Yes LDA  for Pressure Injury Previously documented     [] Yes New Pressure Injury Discovered   [] LDA for New Pressure Injury Added      Attending RN:  Love Poole LPN     Second RN:  DARRION Carr

## 2024-08-26 NOTE — PROGRESS NOTES
Miguel Moore is a 69 y.o. male patient.    Follow for ESRD, dialysis    Patient seen while on dialysis  No new c/o,comfortable    Scheduled Meds:   apixaban  5 mg Oral BID    atorvastatin  80 mg Oral Daily    epoetin marisol-epbx  10,000 Units Subcutaneous Every Mon, Wed, Fri    finasteride  5 mg Oral Daily    mupirocin   Nasal BID    pantoprazole  40 mg Oral BID    sevelamer carbonate  800 mg Oral TID WM    tamsulosin  2 capsule Oral Daily    vitamin renal formula (B-complex-vitamin c-folic acid)  1 capsule Oral Daily       Review of patient's allergies indicates:   Allergen Reactions    Ace inhibitors      Hyperkalemia 8/2018  Other reaction(s): Unknown    Penicillins Hives     Tolerated cefepime and cefdinir previously    Tizanidine      Felt hot         Vital Signs Range (Last 24H):  Temp:  [97.8 °F (36.6 °C)-98.5 °F (36.9 °C)]   Pulse:  [69-84]   Resp:  [18]   BP: (135-152)/(63-67)   SpO2:  [97 %-99 %]     I & O (Last 24H):  Intake/Output Summary (Last 24 hours) at 8/26/2024 1122  Last data filed at 8/26/2024 0934  Gross per 24 hour   Intake 480 ml   Output --   Net 480 ml           Physical Exam:  General appearance: well developed, well nourished, no distress  Lungs:  clear to auscultation bilaterally and normal respiratory effort  Heart: regular rate and rhythm  Abdomen:  soft, normal bowel sounds  Extremities: no cyanosis or edema, or clubbing    Laboratory:  I have reviewed all pertinent lab results within the past 24 hours.  CBC:   Recent Labs   Lab 08/26/24  0606   WBC 5.09   RBC 2.65*   HGB 7.7*   HCT 22.8*      MCV 86   MCH 29.1   MCHC 33.8     CMP:   Recent Labs   Lab 08/21/24  0815 08/22/24  1034 08/26/24  0606   *   < > 105   CALCIUM 8.2*   < > 8.0*   ALBUMIN 2.3*  --   --    PROT 5.6*  --   --       < > 137   K 4.1   < > 4.2   CO2 25   < > 22*      < > 102   BUN 86*   < > 60*   CREATININE 8.2*   < > 8.5*   ALKPHOS 38*  --   --    ALT <5*  --   --    AST 5*  --   --     BILITOT 0.6  --   --     < > = values in this interval not displayed.         Assessment & Plan    ESRD -on dialysis, stable tolerated well  Upper GI bleed  PAD  DM type 2  HTN  Anemia in CKD/acute loss      Trac T Sailaja  8/26/2024

## 2024-08-26 NOTE — NURSING
Informed by transporter that pt refused to go to dialysis at this time due to him wanting to eat his breakfast first.

## 2024-08-26 NOTE — PT/OT/SLP PROGRESS
"Occupational Therapy   Treatment    Name: Miguel Moore  MRN: [id number]  Admitting Diagnosis:  GI bleed  5 Days Post-Op    Recommendations:     Discharge Recommendations: Low Intensity Therapy (w/ caregiver assistance)  Discharge Equipment Recommendations:  none  Barriers to discharge:  None    Assessment:     Miguel Moore is a 69 y.o. male with a medical diagnosis of GI bleed.   Performance deficits affecting function are weakness, impaired endurance, impaired self care skills, impaired functional mobility, impaired balance, decreased upper extremity function, decreased lower extremity function, decreased safety awareness, impaired coordination, edema, impaired skin.     Rehab Prognosis:  Good; patient would benefit from acute skilled OT services to address these deficits and reach maximum level of function.       Plan:     Patient to be seen  (3-5x/wk) to address the above listed problems via self-care/home management, therapeutic activities, therapeutic exercises  Plan of Care Expires: 09/07/24  Plan of Care Reviewed with: patient    Subjective     Chief Complaint: none stated   Patient/Family Comments/goals: "I want to get better; I was doing good before."   Pain/Comfort:  Pain Rating 1: 0/10  Pain Rating Post-Intervention 1: 0/10    Objective:     Communicated with: Nurse prior to session.  Patient found HOB elevated with telemetry, peripheral IV, pressure relief boots upon OT entry to room.    General Precautions: Standard, fall    Orthopedic Precautions: (Pt w/ BLE wounds, R worse than L; reports he has Darco shoes at home but does not wear at all times)  Braces: N/A  Respiratory Status: Room air     Occupational Performance:     Bed Mobility:    Patient completed Scooting/Bridging with stand by assistance and contact guard assistance  Patient completed Supine to Sit with stand by assistance and contact guard assistance  Patient completed Sit to Supine with stand by assistance and contact guard assistance "     Functional Mobility/Transfers:  Patient completed Sit <> Stand Transfer x 3 trials from EOB with moderate assistance, maximal assistance, and of 1 persons  with  use fo bed rail to LUE; pt bearing majority of weight through LLE,off-loading RLE to prevent pressure to wounds; pt able to tolerate ~45 sec per trails, weight shifting to L as needed     Activities of Daily Living:  Upper Body Dressing: moderate assistance for donning gown over back while sitting EOB, unsupported   Grooming: set-up assist for completing oral care while sitting EOB, unsupported; pt required assist for loosening caps to unscrew     [facility] 6 Click ADL: 15    Treatment & Education:  -Pt performed ADLs and functional mobility as noted above.   -Pt educated on safe functional mobility and ADL performance.   -Questions and concerns addressed within scope.     Patient left HOB elevated with all lines intact, call button in reach, bed alarm on, and BLE pressure relief boots.     GOALS:   Multidisciplinary Problems       Occupational Therapy Goals          Problem: Occupational Therapy    Goal Priority Disciplines Outcome Interventions   Occupational Therapy Goal     OT, PT/OT Progressing    Description: Goals to be met by: 9/7/24     Patient will increase functional independence with ADLs by performing:    LE Dressing with Minimal Assistance.  Grooming while EOB with Modified [place].  Supine to sit with Modified [place].  Toilet transfer to bedside commode with Minimal Assistance.  Sit to stand transfer with Minimal Assistance.   Upper extremity exercise program x15 reps per handout, with assistance as needed.  Functional transfer: To Be Assessed                            Time Tracking:     OT Date of Treatment: 08/26/24  OT Start Time: 0855  OT Stop Time: 0921  OT Total Time (min): 26 min    Billable Minutes:Self Care/Home Management 10  Therapeutic Activity 16  Total Time 26    OT/GENEVA: OT          8/26/2024

## 2024-08-26 NOTE — NURSING
Wound care completed at bedside.  Pt tolerated well, pt awake and alert, watching tv and eating dinner  Will continue to monitor

## 2024-08-26 NOTE — PLAN OF CARE
Problem: Hemodialysis  Goal: Safe, Effective Therapy Delivery  Outcome: Progressing  Intervention: Optimize Device Care and Function  Flowsheets (Taken 8/26/2024 1832)  Medication Review/Management: medications reviewed     Problem: Diabetes Comorbidity  Goal: Blood Glucose Level Within Targeted Range  Outcome: Progressing  Intervention: Monitor and Manage Glycemia  Flowsheets (Taken 8/26/2024 1832)  Glycemic Management: blood glucose monitored     Problem: Gastrointestinal Bleeding  Goal: Hemostasis  Outcome: Not Progressing  Intervention: Manage Gastrointestinal Bleeding  Flowsheets (Taken 8/26/2024 1832)  Environmental Support:   calm environment promoted   rest periods encouraged

## 2024-08-26 NOTE — ASSESSMENT & PLAN NOTE
-Admitted to inpatient status  -Presented with GI bleeding  -Has required at least 3 units PRBC an has been treated with PPI drip x 72 hours.  Eliquis has been held.  -GI consulted and input appreciated  -Taken for EGD which showed gastric ulcer with oozing hemorrhage which was treated with clip.    -H/H continued to drop so CTA GI bleeding protocol obtained which showed no active extravasation but did show concerns for oozing. IR consulted and patient underwent left gastric artery coil embolization.   -Today he is feeling well overall, but anxious about if/when to resume his blood thinner.  He reports a black bowel movement today and Hb is relatively stable at 7.7.  -Start PPI q12h which he will need for 8 weeks.  Monitor CBC in AM and if Hb stable may be able to go home with home health.    -Resume eliquis today and monitor over next 24 hours

## 2024-08-26 NOTE — PT/OT/SLP PROGRESS
Physical Therapy      Patient Name:  Miguel Moore   MRN:  [id number]    Patient not seen today secondary to Patient unwilling to participate stating that he was about to go to Dialysis. PT tried to encourage pt that we could get him OOB to walk in hallway before he leaves. Pt still declined. Will follow-up tomorrow.

## 2024-08-27 LAB
ANION GAP SERPL CALC-SCNC: 7 MMOL/L (ref 8–16)
BASOPHILS # BLD AUTO: 0.03 K/UL (ref 0–0.2)
BASOPHILS NFR BLD: 0.6 % (ref 0–1.9)
BUN SERPL-MCNC: 31 MG/DL (ref 8–23)
CALCIUM SERPL-MCNC: 7.8 MG/DL (ref 8.7–10.5)
CHLORIDE SERPL-SCNC: 106 MMOL/L (ref 95–110)
CO2 SERPL-SCNC: 22 MMOL/L (ref 23–29)
CREAT SERPL-MCNC: 5.8 MG/DL (ref 0.5–1.4)
DIFFERENTIAL METHOD BLD: ABNORMAL
EOSINOPHIL # BLD AUTO: 0.2 K/UL (ref 0–0.5)
EOSINOPHIL NFR BLD: 4.4 % (ref 0–8)
ERYTHROCYTE [DISTWIDTH] IN BLOOD BY AUTOMATED COUNT: 17.5 % (ref 11.5–14.5)
EST. GFR  (NO RACE VARIABLE): 10 ML/MIN/1.73 M^2
GLUCOSE SERPL-MCNC: 105 MG/DL (ref 70–110)
HCT VFR BLD AUTO: 23.2 % (ref 40–54)
HGB BLD-MCNC: 7.7 G/DL (ref 14–18)
IMM GRANULOCYTES # BLD AUTO: 0.01 K/UL (ref 0–0.04)
IMM GRANULOCYTES NFR BLD AUTO: 0.2 % (ref 0–0.5)
LYMPHOCYTES # BLD AUTO: 1.1 K/UL (ref 1–4.8)
LYMPHOCYTES NFR BLD: 20.7 % (ref 18–48)
MCH RBC QN AUTO: 29.2 PG (ref 27–31)
MCHC RBC AUTO-ENTMCNC: 33.2 G/DL (ref 32–36)
MCV RBC AUTO: 88 FL (ref 82–98)
MONOCYTES # BLD AUTO: 0.6 K/UL (ref 0.3–1)
MONOCYTES NFR BLD: 10.7 % (ref 4–15)
NEUTROPHILS # BLD AUTO: 3.3 K/UL (ref 1.8–7.7)
NEUTROPHILS NFR BLD: 63.4 % (ref 38–73)
NRBC BLD-RTO: 0 /100 WBC
PLATELET # BLD AUTO: 157 K/UL (ref 150–450)
PMV BLD AUTO: 9.2 FL (ref 9.2–12.9)
POTASSIUM SERPL-SCNC: 4.8 MMOL/L (ref 3.5–5.1)
RBC # BLD AUTO: 2.64 M/UL (ref 4.6–6.2)
SODIUM SERPL-SCNC: 135 MMOL/L (ref 136–145)
WBC # BLD AUTO: 5.21 K/UL (ref 3.9–12.7)

## 2024-08-27 PROCEDURE — 80048 BASIC METABOLIC PNL TOTAL CA: CPT | Performed by: HOSPITALIST

## 2024-08-27 PROCEDURE — 25000003 PHARM REV CODE 250: Performed by: HOSPITALIST

## 2024-08-27 PROCEDURE — 99232 SBSQ HOSP IP/OBS MODERATE 35: CPT | Mod: ,,, | Performed by: NURSE PRACTITIONER

## 2024-08-27 PROCEDURE — 97530 THERAPEUTIC ACTIVITIES: CPT

## 2024-08-27 PROCEDURE — 85025 COMPLETE CBC W/AUTO DIFF WBC: CPT | Performed by: HOSPITALIST

## 2024-08-27 PROCEDURE — 97542 WHEELCHAIR MNGMENT TRAINING: CPT | Mod: CQ

## 2024-08-27 PROCEDURE — 94761 N-INVAS EAR/PLS OXIMETRY MLT: CPT

## 2024-08-27 PROCEDURE — 36415 COLL VENOUS BLD VENIPUNCTURE: CPT | Performed by: HOSPITALIST

## 2024-08-27 PROCEDURE — 63600175 PHARM REV CODE 636 W HCPCS: Performed by: NURSE PRACTITIONER

## 2024-08-27 PROCEDURE — 97530 THERAPEUTIC ACTIVITIES: CPT | Mod: CQ

## 2024-08-27 PROCEDURE — 25000003 PHARM REV CODE 250: Performed by: STUDENT IN AN ORGANIZED HEALTH CARE EDUCATION/TRAINING PROGRAM

## 2024-08-27 PROCEDURE — 11000001 HC ACUTE MED/SURG PRIVATE ROOM

## 2024-08-27 RX ORDER — PANTOPRAZOLE SODIUM 40 MG/10ML
40 INJECTION, POWDER, LYOPHILIZED, FOR SOLUTION INTRAVENOUS 2 TIMES DAILY
Status: DISCONTINUED | OUTPATIENT
Start: 2024-08-27 | End: 2024-08-31 | Stop reason: HOSPADM

## 2024-08-27 RX ADMIN — PANTOPRAZOLE SODIUM 40 MG: 40 INJECTION, POWDER, FOR SOLUTION INTRAVENOUS at 09:08

## 2024-08-27 RX ADMIN — TAMSULOSIN HYDROCHLORIDE 0.8 MG: 0.4 CAPSULE ORAL at 08:08

## 2024-08-27 RX ADMIN — APIXABAN 5 MG: 5 TABLET, FILM COATED ORAL at 09:08

## 2024-08-27 RX ADMIN — ATORVASTATIN CALCIUM 80 MG: 40 TABLET, FILM COATED ORAL at 08:08

## 2024-08-27 RX ADMIN — SEVELAMER CARBONATE 800 MG: 800 TABLET, FILM COATED ORAL at 08:08

## 2024-08-27 RX ADMIN — APIXABAN 5 MG: 5 TABLET, FILM COATED ORAL at 08:08

## 2024-08-27 RX ADMIN — SEVELAMER CARBONATE 800 MG: 800 TABLET, FILM COATED ORAL at 04:08

## 2024-08-27 RX ADMIN — PANTOPRAZOLE SODIUM 40 MG: 40 TABLET, DELAYED RELEASE ORAL at 08:08

## 2024-08-27 RX ADMIN — SEVELAMER CARBONATE 800 MG: 800 TABLET, FILM COATED ORAL at 11:08

## 2024-08-27 RX ADMIN — FINASTERIDE 5 MG: 5 TABLET, FILM COATED ORAL at 08:08

## 2024-08-27 RX ADMIN — Medication 1 CAPSULE: at 08:08

## 2024-08-27 NOTE — PLAN OF CARE
08/27/24 1057   Medicare Message   Important Message from Medicare regarding Discharge Appeal Rights Given to patient/caregiver;Explained to patient/caregiver;Signed/date by patient/caregiver   Date IMM was signed 08/27/24   Time IMM was signed 0951

## 2024-08-27 NOTE — PLAN OF CARE
Problem: Occupational Therapy  Goal: Occupational Therapy Goal  Description: Goals to be met by: 9/7/24     Patient will increase functional independence with ADLs by performing:    LE Dressing with Minimal Assistance.  Grooming while EOB with Modified [place].  Supine to sit with Modified [place].  Toilet transfer to bedside commode with Minimal Assistance.  Sit to stand transfer with Minimal Assistance.   Upper extremity exercise program x15 reps per handout, with assistance as needed.  Functional transfer: To Be Assessed       Outcome: Progressing

## 2024-08-27 NOTE — PLAN OF CARE
Problem: Gastrointestinal Bleeding  Goal: Hemostasis  Outcome: Progressing  Intervention: Manage Gastrointestinal Bleeding  Flowsheets (Taken 8/27/2024 1855)  Stabilization Measures: airway opened  Environmental Support:   calm environment promoted   rest periods encouraged     Problem: Wound  Goal: Optimal Functional Ability  Outcome: Progressing  Intervention: Optimize Functional Ability  Flowsheets (Taken 8/27/2024 1855)  Activity Management: Rolling - L1  Activity Assistance Provided: assistance, 1 person

## 2024-08-27 NOTE — PROGRESS NOTES
[facility] Medicine  Progress Note    Patient Name: Miguel Moore  MRN: [id number]  Patient Class: IP- Inpatient   Admission Date: 8/21/2024  Length of Stay: 6 days  Attending Physician: Bonifacio Reyes MD  Primary Care Provider: Raciel Raymond MD        Subjective:     Principal Problem:GI bleed        HPI:  68 y/o male with a PMHx of DVT, PAF on Eliquis, DMT2, HTN, seizures, stroke, presents to the ER for evaluation of coffee ground emesis and darks stool that started early this morning.  Patient reports 2 episodes of coffee ground emesis and 1 episode of dark stool.  Also reports associated abdominal pain which has now resolved.  No alleviating or aggravating factors.  Patient has not taken his Eliquis today.  He presented to ER where he was noted to be tachycardic.  Labs showing Hgb much lower than baseline.  He denies any fever or chills.  No other complaints.    Overview/Hospital Course:  68 y/o male with a PMHx of DVT, PAF on Eliquis, DMT2, HTN, ESRD, stroke admitted with GI bleed.  Hgb of 5.9, tachycardic and with active bleeding and admitted to ICU.  Started on Protonix drip.  GI consulted.  Nephrology consulted for HD.  Patient was taken for EGD that showed gastric ulcer with oozing hemorrhage, treated with a clip.  Patient was transfused 3 units of blood with good correction of H/H.  Eliquis held.  Will need to continue IV Protonix for 72 hours.until tomorrow.  Wound Care consulted for chronic LE wounds. H/H dropped and he was transfused pRBC. CTA abdomen/pelvis with no active hemorrhage but there is mucosal enhancement of gastric fundus. IR consulted and did angiogram that did not erveal active contrast extravasation but due to ongoing bleeding, elective coil embolization of left gastric artery was done on 8/23/24.       Interval History: Hb stable but patient reports ongoing melena and small amounts of blood.     Review of Systems  Objective:     Vital Signs (Most Recent):  Temp:  98.1 °F (36.7 °C) (08/27/24 0718)  Pulse: 68 (08/27/24 0734)  Resp: 18 (08/27/24 0718)  BP: (!) 146/66 (08/27/24 0718)  SpO2: 99 % (08/27/24 0718) Vital Signs (24h Range):  Temp:  [97.6 °F (36.4 °C)-98.7 °F (37.1 °C)] 98.1 °F (36.7 °C)  Pulse:  [67-92] 68  Resp:  [17-19] 18  SpO2:  [95 %-100 %] 99 %  BP: (135-175)/(59-85) 146/66     Weight: 76.5 kg (168 lb 10.4 oz)  Body mass index is 25.64 kg/m².    Intake/Output Summary (Last 24 hours) at 8/27/2024 1014  Last data filed at 8/26/2024 1833  Gross per 24 hour   Intake 980 ml   Output 2500 ml   Net -1520 ml         Physical Exam  Vitals and nursing note reviewed.   Constitutional:       General: He is not in acute distress.     Appearance: He is ill-appearing.   Cardiovascular:      Rate and Rhythm: Normal rate and regular rhythm.      Pulses: Normal pulses.   Pulmonary:      Effort: Pulmonary effort is normal. No respiratory distress.   Abdominal:      General: Bowel sounds are normal. There is no distension.   Musculoskeletal:      Comments: B/l lower extremity wounds, dressing intact   Skin:     General: Skin is warm.   Neurological:      General: No focal deficit present.      Mental Status: He is alert and oriented to person, place, and time.   Psychiatric:         Mood and Affect: Mood normal.             Significant Labs: All pertinent labs within the past 24 hours have been reviewed.    Significant Imaging: I have reviewed all pertinent imaging results/findings within the past 24 hours.    Assessment/Plan:      * GI bleed  -Admitted to inpatient status  -Presented with GI bleeding  -Has required at least 3 units PRBC an has been treated with PPI drip x 72 hours.  Eliquis has been held.  -GI consulted and input appreciated  -Taken for EGD which showed gastric ulcer with oozing hemorrhage which was treated with clip.    -H/H continued to drop so CTA GI bleeding protocol obtained which showed no active extravasation but did show concerns for oozing. IR consulted  and patient underwent left gastric artery coil embolization.   -Today he is feeling well overall, but anxious about if/when to resume his blood thinner.  He reports a black bowel movement today and Hb is relatively stable at 7.7.  -Start PPI q12h which he will need for 8 weeks.  Monitor CBC in AM and if Hb stable may be able to go home with home health.    -Resume eliquis and monitor. Hb has been stable but he reports ongoing melena and bleeding.     Gastric ulcer with hemorrhage  -Treatment as above.    Acute blood loss anemia (ABLA)  -Treatment as above.    Wounds, multiple  -Stage 4 right plantar foot wound POA  -Stage 4 right mid plantar foot wound POA  -Unstageable right lateral heel wound POA  -Unstageable left medial heel wound POA    Paroxysmal atrial fibrillation  -Patient with Paroxysmal (<7 days) atrial fibrillation which is controlled.   -Patient is currently in sinus rhythm.[id number] Score: 3  -resume eliquis    Anemia in chronic kidney disease  -Baseline anemia is chronic due to ESRD and acute drop is due to acute blood loss from GI bleeding  -Treatment as above.    Chronic deep vein thrombosis (DVT) of popliteal vein of right lower extremity 9/21/20 outside [place]; unprovoked; anticoagulation  -Noted history  -resume eliquis    ESRD (end stage renal disease)  -Noted ESRD and on HD outpatient MWF  -Nephrology consulted  -Continue HD per nephrology    Hemiplegia and hemiparesis following cerebral infarction affecting right dominant side  -History noted  -PT/OT consulted and recommend home health at discharge    BPH (benign prostatic hyperplasia) 2/18/21 prostate bx normal  -Continue tamsulosin and finasteride    Dyslipidemia  -Continue statin    Benign essential HTN  -BP is normal to slightly elevated  -Home med list has no anti-hypertensive medications  -Continue to monitor for now.      VTE Risk Mitigation (From admission, onward)           Ordered     apixaban tablet 5 mg  2 times daily         08/26/24  0756     IP VTE HIGH RISK PATIENT  Once         08/21/24 0957     Place sequential compression device  Until discontinued         08/21/24 0957                    Discharge Planning   GARY:      Code Status: Full Code   Is the patient medically ready for discharge?:     Reason for patient still in hospital (select all that apply): Treatment  Discharge Plan A: Home Health                  Bonifacio Reyes MD  Department of [facility] Medicine   [facility]

## 2024-08-27 NOTE — PT/OT/SLP PROGRESS
Occupational Therapy   Treatment    Name: Miguel Moore  MRN: [id number]  Admitting Diagnosis:  GI bleed  6 Days Post-Op    Recommendations:     Discharge Recommendations: Low Intensity Therapy (w/ caregiver assistance)  Discharge Equipment Recommendations:  none  Barriers to discharge:  None    Assessment:     Miguel Moore is a 69 y.o. male with a medical diagnosis of GI bleed. Performance deficits affecting function are weakness, impaired endurance, impaired functional mobility, impaired self care skills, gait instability, impaired balance, decreased upper extremity function, decreased lower extremity function, decreased safety awareness, decreased ROM, impaired joint extensibility, impaired muscle length, impaired skin.     Rehab Prognosis:  Good; patient would benefit from acute skilled OT services to address these deficits and reach maximum level of function.       Plan:     Patient to be seen  (3-5x/wk) to address the above listed problems via self-care/home management, therapeutic activities, therapeutic exercises  Plan of Care Expires: 09/07/24  Plan of Care Reviewed with: patient    Subjective     Chief Complaint: none stated   Patient/Family Comments/goals: wanting to transfer to   Pain/Comfort:  Pain Rating 1: 0/10  Pain Rating Post-Intervention 1: 0/10    Objective:     Communicated with: Nurse prior to session.  Patient found HOB elevated with telemetry, peripheral IV, pressure relief boots upon OT entry to room.    General Precautions: Standard, fall    Orthopedic Precautions: (Pt w/ BLE wounds, R worse than L; reports he has Darco shoes at home but does not wear at all times)  Braces: N/A  Respiratory Status: Room air     Occupational Performance:     Bed Mobility:    Patient completed Scooting hips to EOB with stand by assistance  Patient completed Supine to Sit with stand by assistance     Functional Mobility/Transfers:  Patient completed Sit <> Stand Transfer (towards L) with moderate-max  assistance and of 1 persons (w/ use of bed rail)  with  hand-held assist   Patient completed Bed <> Chair Transfer using Stand Pivot technique with moderate assistance and of 1 persons with hand-held assist; pt bearing majority of weight through LLE versus RLE  Functional Mobility: Pt was able to propel W/C at functional distances w/ SBA-supervision; pt able to use LUE/LLE for management.     Activities of Daily Living:  Upper Body Dressing: moderate assistance for donning gown over back       AMPA 6 Click ADL: 15    Treatment & Education:  -Pt performed ADLs and functional mobility as noted above.   -Pt educated on safe functional mobility and ADL performance.   -Questions and concerns addressed within scope.     Patient left up in chair with PT in hallway.     GOALS:   Multidisciplinary Problems       Occupational Therapy Goals          Problem: Occupational Therapy    Goal Priority Disciplines Outcome Interventions   Occupational Therapy Goal     OT, PT/OT Progressing    Description: Goals to be met by: 9/7/24     Patient will increase functional independence with ADLs by performing:    LE Dressing with Minimal Assistance.  Grooming while EOB with Modified [place].  Supine to sit with Modified [place].  Toilet transfer to bedside commode with Minimal Assistance.  Sit to stand transfer with Minimal Assistance.   Upper extremity exercise program x15 reps per handout, with assistance as needed.  Functional transfer: To Be Assessed                            Time Tracking:     OT Date of Treatment: 08/27/24  OT Start Time: 1105  OT Stop Time: 1121  OT Total Time (min): 16 min    Billable Minutes:Therapeutic Activity 16  Total Time 16    OT/GENEVA: OT          8/27/2024

## 2024-08-27 NOTE — PLAN OF CARE
I provided the patient a choice of post acute providers and offered a list of CMS rated Home Health      Patient/family chose the following as their preferred providers:  [facility]     08/27/24 1537   Discharge Reassessment   Assessment Type Discharge Planning Reassessment   Did the patient's condition or plan change since previous assessment? No   Discharge Plan discussed with: Patient   Communicated GARY with patient/caregiver Yes   Discharge Plan A Home Health   Discharge Plan B Home Health   DME Needed Upon Discharge  none   Transition of Care Barriers None   Why the patient remains in the hospital Requires continued medical care   Post-Acute Status   Post-Acute Authorization Home Health   Home Health Status Set-up Complete/Auth obtained   Discharge Delays None known at this time

## 2024-08-27 NOTE — PT/OT/SLP PROGRESS
Physical Therapy Treatment    Patient Name:  Miguel Moore   MRN:  [id number]    Recommendations:     Discharge Recommendations: Low Intensity Therapy  Discharge Equipment Recommendations: none  Barriers to discharge: None    Assessment:     Miguel Moore is a 69 y.o. male admitted with a medical diagnosis of GI bleed.  He presents with the following impairments/functional limitations: weakness, impaired endurance, impaired self care skills, impaired functional mobility, gait instability, decreased lower extremity function, impaired fine motor, impaired coordination, decreased ROM, decreased upper extremity function, decreased coordination, impaired cognition, impaired balance, decreased safety awareness, pain, impaired skin, edema, impaired sensation, impaired muscle length, impaired joint extensibility .    Rehab Prognosis: Fair; patient would benefit from acute skilled PT services to address these deficits and reach maximum level of function.    Recent Surgery: Procedure(s) (LRB):  EGD (ESOPHAGOGASTRODUODENOSCOPY) (N/A) 6 Days Post-Op    Plan:     During this hospitalization, patient to be seen 5 x/week to address the identified rehab impairments via therapeutic activities, therapeutic exercises, gait training, wheelchair management/training, neuromuscular re-education and progress toward the following goals:    Plan of Care Expires:  09/07/24    Subjective     Chief Complaint: fatigue toward the end of treatment   Patient/Family Comments/goals: pt is pleasant and agreeable to therapy   Pain/Comfort:  Pain Rating 1: 0/10  Pain Rating Post-Intervention 1: 0/10      Objective:     Communicated with nurse prior to session.  Patient found up in wheelchair with OT  telemetry, peripheral IV upon PT entry to room.     General Precautions: Standard, fall, diabetic  Orthopedic Precautions: N/A  Braces: N/A  Respiratory Status: Room air     Functional Mobility:  Bed Mobility:     Scooting: contact guard assistance and  minimum assistance  Sit to Supine: stand by assistance and contact guard assistance  Transfers:  V/T cues for safety, proper technique , L hand placement to push off and RLE  blocked to prevent sliding .   Sit to Stand: from wheelchair and bed maximal assistance with no AD  Wheelchair to bed : maximal assistance / total assistance with  no AD  using  Squat Pivot  Pt propelled wheelchair ~ 100 ft and 200 ft with LUE and BLE, SBA  . Pt required rest breaks 2* to fatigue, V/T cues for safety awareness .    Balance: good in sitting, poor in standing       AM-PAC 6 CLICK MOBILITY  Turning over in bed (including adjusting bedclothes, sheets and blankets)?: 4  Sitting down on and standing up from a chair with arms (e.g., wheelchair, bedside commode, etc.): 2  Moving from lying on back to sitting on the side of the bed?: 3  Moving to and from a bed to a chair (including a wheelchair)?: 2  Need to walk in hospital room?: 1  Climbing 3-5 steps with a railing?: 1  Basic Mobility Total Score: 13       Treatment & Education:  Pt performed hand hygiene at the sink while sitting in wheelchair with set up assistance.     Patient left with bed in chair position with pressure relief boots to BLE ( pt declined to sit up in wheelchair)   all lines intact, call button in reach, bed alarm on, nurse notified, and S.O  present..    GOALS:   Multidisciplinary Problems       Physical Therapy Goals          Problem: Physical Therapy    Goal Priority Disciplines Outcome Goal Variances Interventions   Physical Therapy Goal     PT, PT/OT Progressing     Description: Goals to be met by: 24     Patient will increase functional independence with mobility by performin. Supine to sit with Contact Guard Assistance  2. Sit to supine with Contact Guard Assistance  3. Rolling to Left and Right with Supervision.  4. Sit to stand transfer with Contact Guard Assistance  5. Bed to chair transfer with Contact Guard Assistance using Rolling Walker                          Time Tracking:     PT Received On: 08/27/24  PT Start Time: 1121     PT Stop Time: 1148  PT Total Time (min): 27 min     Billable Minutes: Therapeutic Activity 13 and Train/Wheelchair Management 14    Treatment Type: Treatment  PT/PTA: PTA     Number of PTA visits since last PT visit: 1 08/27/2024

## 2024-08-27 NOTE — PLAN OF CARE
Problem: Hemodialysis  Goal: Safe, Effective Therapy Delivery  Outcome: Progressing  Goal: Effective Tissue Perfusion  Outcome: Progressing  Goal: Absence of Infection Signs and Symptoms  Outcome: Progressing     Problem: Adult Inpatient Plan of Care  Goal: Plan of Care Review  Outcome: Progressing  Goal: Patient-Specific Goal (Individualized)  Outcome: Progressing  Goal: Absence of Hospital-Acquired Illness or Injury  Outcome: Progressing  Goal: Optimal Comfort and Wellbeing  Outcome: Progressing  Goal: Readiness for Transition of Care  Outcome: Progressing     Problem: Wound  Goal: Optimal Coping  Outcome: Progressing  Goal: Optimal Functional Ability  Outcome: Progressing  Goal: Absence of Infection Signs and Symptoms  Outcome: Progressing  Goal: Improved Oral Intake  Outcome: Progressing  Goal: Optimal Pain Control and Function  Outcome: Progressing  Goal: Skin Health and Integrity  Outcome: Progressing  Goal: Optimal Wound Healing  Outcome: Progressing     Problem: Skin Injury Risk Increased  Goal: Skin Health and Integrity  Outcome: Progressing     Problem: Fall Injury Risk  Goal: Absence of Fall and Fall-Related Injury  Outcome: Progressing

## 2024-08-27 NOTE — NURSING
Wound care completed at the bedside.  Pt tolerated well, pt awake and alert.  Will continue to monitor

## 2024-08-27 NOTE — PROGRESS NOTES
[facility] Progress Note    Patient Complaint: black stools, black vomit    PCP:   Raciel Raymond       LOS: 6        Initial History of Present Illness (HPI):  This is a 69 y.o. male consulted to GI service for upper GI bleeding. PMH DVT, DMT2, HTN, seizures, stroke on eliquis. Patient complaint of acute onset of large volume coffee ground emesis with associated symptoms of abdominal discomfort, diaphoresis and black stools that began this am. He reports his last eliquis was Tuesday evening. Denies fever, chills, syncope, BRBPR or constipation. Denies NSAID use. Denies smoking or drinking. On hemodialysis MWF.     Hgb 5.9, bun 86, creat 3.2    Interval hx  S/p EGD with oozing ulcer noted and tx with apc and clip placed. S/p empiric coil embolization on Friday. Reports ongoing melena with stable hgb.    Medical History:  has a past medical history of Allergy, Clotting disorder, Deep vein thrombosis, Diabetes mellitus, type 2, Hypertension, Nuclear sclerosis of both eyes (6/9/2017), Renal disorder, Seizures, Stroke, and Urinary tract infection.    Surgical History:  has a past surgical history that includes Knee surgery; Femoral artery stent; Esophagogastroduodenoscopy (N/A, 8/17/2020); Eye surgery (Bilateral); Cystoscopy w/ retrogrades (Bilateral, 2/18/2021); Fracture surgery; Cataract extraction w/  intraocular lens implant (Right, 10/05/2017); Cataract extraction w/  intraocular lens implant (Left, 10/19/2017); incision and drainage, lower extremity (Right, 4/4/2024); incision and drainage, lower extremity (Right, 4/6/2024); incision and drainage, lower extremity (Right, 4/9/2024); Wound dressing (Right, 4/9/2024); incision and drainage, lower extremity (Right, 5/21/2024); and Esophagogastroduodenoscopy (N/A, 8/21/2024).      Objective Findings:    Vital Signs:  Temp:  [97.6 °F (36.4 °C)-98.7 °F (37.1 °C)]   Pulse:  [67-90]   Resp:  [17-19]   BP: (135-164)/(59-85)   SpO2:  [95 %-100 %]   Body  mass index is 25.64 kg/m².      Physical Exam  Vitals and nursing note reviewed.   Constitutional:       Appearance: Normal appearance.   HENT:      Head: Normocephalic.   Pulmonary:      Effort: Pulmonary effort is normal.   Abdominal:      General: Bowel sounds are normal.      Palpations: Abdomen is soft.   Musculoskeletal:      Comments: Right sided hemiparesis   Skin:     General: Skin is warm and dry.   Neurological:      Mental Status: He is alert and oriented to person, place, and time.   Psychiatric:         Mood and Affect: Mood normal.         Behavior: Behavior normal.         Thought Content: Thought content normal.         Judgment: Judgment normal.               Labs:  Lab Results   Component Value Date    WBC 5.21 08/27/2024    HGB 7.7 (L) 08/27/2024    HCT 23.2 (L) 08/27/2024     08/27/2024    CHOL 190 04/27/2023    TRIG 106 04/27/2023    HDL 36 (L) 04/27/2023    ALT <5 (L) 08/21/2024    AST 5 (L) 08/21/2024     (L) 08/27/2024    K 4.8 08/27/2024     08/27/2024    CREATININE 5.8 (H) 08/27/2024    BUN 31 (H) 08/27/2024    CO2 22 (L) 08/27/2024    TSH 2.672 03/20/2024    PSA 10.2 (H) 11/19/2020    INR 1.1 08/21/2024    HGBA1C 5.2 06/24/2024             Endoscopy: 8/21 EGD- Normal esophagus.                          - Gastric ulcer with oozing hemorrhage (Mauricio                          Class Ib). Clip was placed. Clip :                          [facility]. hemostatic spray applied.                          - Clotted blood in the gastric fundus.                          - Normal examined duodenum.                          - No specimens collected.            GI bleed. Melena. Coffee Ground emesis. Symptomatic anemia. Acute blood loss anemia. Current anticoagulant use. Gastric ulcer.  Plan/ Recommendations:  S/p egd with oozing ulcer. S/p empiric coil embolization on Friday. Reports ongoing melena with stable hgb.  Suspect old blood.Plan to monitor counts and stool  occurrences. If counts decrease, plan for egd on tomorrow for second look. Make npo at [place]. Continue ppi po bid x 8 weeks. Plan for f/u dual endoscopy in 8-12 weeks, request sent.         Thank you so much for allowing us to participate in the care of Miguel Moore . Please contact us if you have any additional questions.    Bren Ovalles NP  Gastroenterology  [facility] - [place] - [facility]

## 2024-08-27 NOTE — ASSESSMENT & PLAN NOTE
-Admitted to inpatient status  -Presented with GI bleeding  -Has required at least 3 units PRBC an has been treated with PPI drip x 72 hours.  Eliquis has been held.  -GI consulted and input appreciated  -Taken for EGD which showed gastric ulcer with oozing hemorrhage which was treated with clip.    -H/H continued to drop so CTA GI bleeding protocol obtained which showed no active extravasation but did show concerns for oozing. IR consulted and patient underwent left gastric artery coil embolization.   -Today he is feeling well overall, but anxious about if/when to resume his blood thinner.  He reports a black bowel movement today and Hb is relatively stable at 7.7.  -Start PPI q12h which he will need for 8 weeks.  Monitor CBC in AM and if Hb stable may be able to go home with home health.    -Resume eliquis and monitor. Hb has been stable but he reports ongoing melena and bleeding.

## 2024-08-27 NOTE — SUBJECTIVE & OBJECTIVE
Interval History: Hb stable but patient reports ongoing melena and small amounts of blood.     Review of Systems  Objective:     Vital Signs (Most Recent):  Temp: 98.1 °F (36.7 °C) (08/27/24 0718)  Pulse: 68 (08/27/24 0734)  Resp: 18 (08/27/24 0718)  BP: (!) 146/66 (08/27/24 0718)  SpO2: 99 % (08/27/24 0718) Vital Signs (24h Range):  Temp:  [97.6 °F (36.4 °C)-98.7 °F (37.1 °C)] 98.1 °F (36.7 °C)  Pulse:  [67-92] 68  Resp:  [17-19] 18  SpO2:  [95 %-100 %] 99 %  BP: (135-175)/(59-85) 146/66     Weight: 76.5 kg (168 lb 10.4 oz)  Body mass index is 25.64 kg/m².    Intake/Output Summary (Last 24 hours) at 8/27/2024 1014  Last data filed at 8/26/2024 1833  Gross per 24 hour   Intake 980 ml   Output 2500 ml   Net -1520 ml         Physical Exam  Vitals and nursing note reviewed.   Constitutional:       General: He is not in acute distress.     Appearance: He is ill-appearing.   Cardiovascular:      Rate and Rhythm: Normal rate and regular rhythm.      Pulses: Normal pulses.   Pulmonary:      Effort: Pulmonary effort is normal. No respiratory distress.   Abdominal:      General: Bowel sounds are normal. There is no distension.   Musculoskeletal:      Comments: B/l lower extremity wounds, dressing intact   Skin:     General: Skin is warm.   Neurological:      General: No focal deficit present.      Mental Status: He is alert and oriented to person, place, and time.   Psychiatric:         Mood and Affect: Mood normal.             Significant Labs: All pertinent labs within the past 24 hours have been reviewed.    Significant Imaging: I have reviewed all pertinent imaging results/findings within the past 24 hours.

## 2024-08-28 LAB
ANION GAP SERPL CALC-SCNC: 10 MMOL/L (ref 8–16)
BASOPHILS # BLD AUTO: 0.03 K/UL (ref 0–0.2)
BASOPHILS NFR BLD: 0.6 % (ref 0–1.9)
BUN SERPL-MCNC: 41 MG/DL (ref 8–23)
CALCIUM SERPL-MCNC: 8 MG/DL (ref 8.7–10.5)
CHLORIDE SERPL-SCNC: 104 MMOL/L (ref 95–110)
CO2 SERPL-SCNC: 21 MMOL/L (ref 23–29)
CREAT SERPL-MCNC: 7.7 MG/DL (ref 0.5–1.4)
DIFFERENTIAL METHOD BLD: ABNORMAL
EOSINOPHIL # BLD AUTO: 0.2 K/UL (ref 0–0.5)
EOSINOPHIL NFR BLD: 4.3 % (ref 0–8)
ERYTHROCYTE [DISTWIDTH] IN BLOOD BY AUTOMATED COUNT: 17.1 % (ref 11.5–14.5)
EST. GFR  (NO RACE VARIABLE): 7 ML/MIN/1.73 M^2
GLUCOSE SERPL-MCNC: 92 MG/DL (ref 70–110)
HCT VFR BLD AUTO: 22.8 % (ref 40–54)
HGB BLD-MCNC: 7.4 G/DL (ref 14–18)
IMM GRANULOCYTES # BLD AUTO: 0.02 K/UL (ref 0–0.04)
IMM GRANULOCYTES NFR BLD AUTO: 0.4 % (ref 0–0.5)
LYMPHOCYTES # BLD AUTO: 1.2 K/UL (ref 1–4.8)
LYMPHOCYTES NFR BLD: 24.6 % (ref 18–48)
MCH RBC QN AUTO: 28.1 PG (ref 27–31)
MCHC RBC AUTO-ENTMCNC: 32.5 G/DL (ref 32–36)
MCV RBC AUTO: 87 FL (ref 82–98)
MONOCYTES # BLD AUTO: 0.4 K/UL (ref 0.3–1)
MONOCYTES NFR BLD: 9 % (ref 4–15)
NEUTROPHILS # BLD AUTO: 3 K/UL (ref 1.8–7.7)
NEUTROPHILS NFR BLD: 61.1 % (ref 38–73)
NRBC BLD-RTO: 0 /100 WBC
PLATELET # BLD AUTO: 185 K/UL (ref 150–450)
PMV BLD AUTO: 9.7 FL (ref 9.2–12.9)
POTASSIUM SERPL-SCNC: 4.6 MMOL/L (ref 3.5–5.1)
RBC # BLD AUTO: 2.63 M/UL (ref 4.6–6.2)
SODIUM SERPL-SCNC: 135 MMOL/L (ref 136–145)
WBC # BLD AUTO: 4.87 K/UL (ref 3.9–12.7)

## 2024-08-28 PROCEDURE — 36415 COLL VENOUS BLD VENIPUNCTURE: CPT | Performed by: HOSPITALIST

## 2024-08-28 PROCEDURE — 5A1D70Z PERFORMANCE OF URINARY FILTRATION, INTERMITTENT, LESS THAN 6 HOURS PER DAY: ICD-10-PCS | Performed by: INTERNAL MEDICINE

## 2024-08-28 PROCEDURE — 63600175 PHARM REV CODE 636 W HCPCS: Mod: JZ,JG | Performed by: STUDENT IN AN ORGANIZED HEALTH CARE EDUCATION/TRAINING PROGRAM

## 2024-08-28 PROCEDURE — 80048 BASIC METABOLIC PNL TOTAL CA: CPT | Performed by: HOSPITALIST

## 2024-08-28 PROCEDURE — 25000003 PHARM REV CODE 250: Performed by: STUDENT IN AN ORGANIZED HEALTH CARE EDUCATION/TRAINING PROGRAM

## 2024-08-28 PROCEDURE — 25000003 PHARM REV CODE 250: Performed by: HOSPITALIST

## 2024-08-28 PROCEDURE — 63600175 PHARM REV CODE 636 W HCPCS: Performed by: NURSE PRACTITIONER

## 2024-08-28 PROCEDURE — 11000001 HC ACUTE MED/SURG PRIVATE ROOM

## 2024-08-28 PROCEDURE — 85025 COMPLETE CBC W/AUTO DIFF WBC: CPT | Performed by: HOSPITALIST

## 2024-08-28 RX ORDER — SUCRALFATE 1 G/10ML
1 SUSPENSION ORAL
Status: DISCONTINUED | OUTPATIENT
Start: 2024-08-28 | End: 2024-08-31 | Stop reason: HOSPADM

## 2024-08-28 RX ADMIN — APIXABAN 5 MG: 5 TABLET, FILM COATED ORAL at 08:08

## 2024-08-28 RX ADMIN — ATORVASTATIN CALCIUM 80 MG: 40 TABLET, FILM COATED ORAL at 09:08

## 2024-08-28 RX ADMIN — PANTOPRAZOLE SODIUM 40 MG: 40 INJECTION, POWDER, FOR SOLUTION INTRAVENOUS at 08:08

## 2024-08-28 RX ADMIN — SEVELAMER CARBONATE 800 MG: 800 TABLET, FILM COATED ORAL at 07:08

## 2024-08-28 RX ADMIN — SEVELAMER CARBONATE 800 MG: 800 TABLET, FILM COATED ORAL at 04:08

## 2024-08-28 RX ADMIN — SUCRALFATE 1 G: 1 SUSPENSION ORAL at 08:08

## 2024-08-28 RX ADMIN — SUCRALFATE 1 G: 1 SUSPENSION ORAL at 04:08

## 2024-08-28 RX ADMIN — FINASTERIDE 5 MG: 5 TABLET, FILM COATED ORAL at 09:08

## 2024-08-28 RX ADMIN — EPOETIN ALFA-EPBX 10000 UNITS: 10000 INJECTION, SOLUTION INTRAVENOUS; SUBCUTANEOUS at 09:08

## 2024-08-28 RX ADMIN — TAMSULOSIN HYDROCHLORIDE 0.8 MG: 0.4 CAPSULE ORAL at 09:08

## 2024-08-28 RX ADMIN — OXYCODONE AND ACETAMINOPHEN 1 TABLET: 10; 325 TABLET ORAL at 10:08

## 2024-08-28 RX ADMIN — PANTOPRAZOLE SODIUM 40 MG: 40 INJECTION, POWDER, FOR SOLUTION INTRAVENOUS at 09:08

## 2024-08-28 RX ADMIN — APIXABAN 5 MG: 5 TABLET, FILM COATED ORAL at 09:08

## 2024-08-28 RX ADMIN — Medication 1 CAPSULE: at 09:08

## 2024-08-28 NOTE — PROGRESS NOTES
[facility]  Progress Note    Patient Name: Miguel Moore  MRN: [id number]  Patient Class: IP- Inpatient   Admission Date: 8/21/2024  Length of Stay: 7 days  Attending Physician: Bonifacio Reyes MD  Primary Care Provider: Raciel Raymond MD        Subjective:     Principal Problem:GI bleed        HPI:  68 y/o male with a PMHx of DVT, PAF on Eliquis, DMT2, HTN, seizures, stroke, presents to the ER for evaluation of coffee ground emesis and darks stool that started early this morning.  Patient reports 2 episodes of coffee ground emesis and 1 episode of dark stool.  Also reports associated abdominal pain which has now resolved.  No alleviating or aggravating factors.  Patient has not taken his Eliquis today.  He presented to ER where he was noted to be tachycardic.  Labs showing Hgb much lower than baseline.  He denies any fever or chills.  No other complaints.    Overview/Hospital Course:  68 y/o male with a PMHx of DVT, PAF on Eliquis, DMT2, HTN, ESRD, stroke admitted with GI bleed.  Hgb of 5.9, tachycardic and with active bleeding and admitted to ICU.  Started on Protonix drip.  GI consulted.  Nephrology consulted for HD.  Patient was taken for EGD that showed gastric ulcer with oozing hemorrhage, treated with a clip.  Patient was transfused 3 units of blood with good correction of H/H.  Eliquis held.  Will need to continue IV Protonix for 72 hours.until tomorrow.  Wound Care consulted for chronic LE wounds. H/H dropped and he was transfused pRBC. CTA abdomen/pelvis with no active hemorrhage but there is mucosal enhancement of gastric fundus. IR consulted and did angiogram that did not erveal active contrast extravasation but due to ongoing bleeding, elective coil embolization of left gastric artery was done on 8/23/24. Hb stabilized  and he was cleared to be restarted on eliquis. Still with ongoing dark tarry stool but Hb stable. Started on carafate.      Interval History: still with  black stool, he is very concerned though Hb remains stable on eliquis and this is likely old from previous bleeding. Discussed with GI and will plan to add carafate    Review of Systems  Objective:     Vital Signs (Most Recent):  Temp: 98.1 °F (36.7 °C) (08/28/24 1430)  Pulse: 92 (08/28/24 1430)  Resp: 16 (08/28/24 1430)  BP: (!) 157/62 (08/28/24 1430)  SpO2: 95 % (08/28/24 1430) Vital Signs (24h Range):  Temp:  [97.2 °F (36.2 °C)-98.8 °F (37.1 °C)] 98.1 °F (36.7 °C)  Pulse:  [69-92] 92  Resp:  [16-18] 16  SpO2:  [95 %-100 %] 95 %  BP: (127-168)/(59-71) 157/62     Weight: 76.5 kg (168 lb 10.4 oz)  Body mass index is 25.64 kg/m².    Intake/Output Summary (Last 24 hours) at 8/28/2024 1602  Last data filed at 8/28/2024 1415  Gross per 24 hour   Intake 710 ml   Output 2500 ml   Net -1790 ml         Physical Exam  Vitals and nursing note reviewed.   Constitutional:       General: He is not in acute distress.     Appearance: He is ill-appearing.   Cardiovascular:      Rate and Rhythm: Normal rate and regular rhythm.      Pulses: Normal pulses.   Pulmonary:      Effort: Pulmonary effort is normal. No respiratory distress.   Abdominal:      General: Bowel sounds are normal. There is no distension.   Musculoskeletal:      Comments: B/l lower extremity wounds, dressing intact   Skin:     General: Skin is warm.   Neurological:      General: No focal deficit present.      Mental Status: He is alert and oriented to person, place, and time.   Psychiatric:         Mood and Affect: Mood normal.             Significant Labs: All pertinent labs within the past 24 hours have been reviewed.    Significant Imaging: I have reviewed all pertinent imaging results/findings within the past 24 hours.    Assessment/Plan:      * GI bleed  -Admitted to inpatient status  -Presented with GI bleeding  -Has required at least 3 units PRBC an has been treated with PPI drip x 72 hours.  Eliquis has been held.  -GI consulted and input  appreciated  -Taken for EGD which showed gastric ulcer with oozing hemorrhage which was treated with clip.    -H/H continued to drop so CTA GI bleeding protocol obtained which showed no active extravasation but did show concerns for oozing. IR consulted and patient underwent left gastric artery coil embolization.   -Today he is feeling well overall, but anxious about if/when to resume his blood thinner.  He reports a black bowel movement today and Hb is relatively stable at 7.7.  -Start PPI q12h which he will need for 8 weeks.  Monitor CBC in AM and if Hb stable may be able to go home with home health.    -Resume eliquis and monitor. Hb has been stable but he reports ongoing melena and bleeding.   - Add on carafate    Gastric ulcer with hemorrhage  -Treatment as above.    Acute blood loss anemia (ABLA)  -Treatment as above.    Wounds, multiple  -Stage 4 right plantar foot wound POA  -Stage 4 right mid plantar foot wound POA  -Unstageable right lateral heel wound POA  -Unstageable left medial heel wound POA    Paroxysmal atrial fibrillation  -Patient with Paroxysmal (<7 days) atrial fibrillation which is controlled.   -Patient is currently in sinus rhythm.[id number] Score: 3  -resume eliquis    Anemia in chronic kidney disease  -Baseline anemia is chronic due to ESRD and acute drop is due to acute blood loss from GI bleeding  -Treatment as above.    Chronic deep vein thrombosis (DVT) of popliteal vein of right lower extremity 9/21/20 outside [place]; unprovoked; anticoagulation  -Noted history  -resume eliquis    ESRD (end stage renal disease)  -Noted ESRD and on HD outpatient MWF  -Nephrology consulted  -Continue HD per nephrology    Hemiplegia and hemiparesis following cerebral infarction affecting right dominant side  -History noted  -PT/OT consulted and recommend home health at discharge    BPH (benign prostatic hyperplasia) 2/18/21 prostate bx normal  -Continue tamsulosin and finasteride    Dyslipidemia  -Continue  statin    Benign essential HTN  -BP is normal to slightly elevated  -Home med list has no anti-hypertensive medications  -Continue to monitor for now.      VTE Risk Mitigation (From admission, onward)           Ordered     apixaban tablet 5 mg  2 times daily         08/26/24 0756     IP VTE HIGH RISK PATIENT  Once         08/21/24 0957     Place sequential compression device  Until discontinued         08/21/24 0957                    Discharge Planning   GARY: 8/30/2024     Code Status: Full Code   Is the patient medically ready for discharge?:     Reason for patient still in hospital (select all that apply): Treatment  Discharge Plan A: Home Health   Discharge Delays: None known at this time              Bonifacio Reyes MD  Department of Hospital Medicine   [facility] - [place] - On license of [facility]

## 2024-08-28 NOTE — PROGRESS NOTES
Miguel Moore is a 69 y.o. male patient.    Follow for ESRD, dialysis    Patient seen while on dialysis  No new c/o, feeling well  Comfortable    Scheduled Meds:   apixaban  5 mg Oral BID    atorvastatin  80 mg Oral Daily    epoetin marisol-epbx  10,000 Units Subcutaneous Every Mon, Wed, Fri    finasteride  5 mg Oral Daily    pantoprazole  40 mg Intravenous BID    sevelamer carbonate  800 mg Oral TID WM    tamsulosin  2 capsule Oral Daily    vitamin renal formula (B-complex-vitamin c-folic acid)  1 capsule Oral Daily       Review of patient's allergies indicates:   Allergen Reactions    Ace inhibitors      Hyperkalemia 8/2018  Other reaction(s): Unknown    Penicillins Hives     Tolerated cefepime and cefdinir previously    Tizanidine      Felt hot         Vital Signs Range (Last 24H):  Temp:  [97.9 °F (36.6 °C)-98.8 °F (37.1 °C)]   Pulse:  [69-85]   Resp:  [18]   BP: (127-149)/(59-68)   SpO2:  [97 %-100 %]     I & O (Last 24H):  Intake/Output Summary (Last 24 hours) at 8/28/2024 1007  Last data filed at 8/27/2024 2140  Gross per 24 hour   Intake 600 ml   Output --   Net 600 ml           Physical Exam:  General appearance: well developed, well nourished, no distress  Lungs:  clear to auscultation bilaterally and normal respiratory effort  Heart: regular rate and rhythm  Abdomen:  soft, normal sounds  Extremities: edema trace    Laboratory:  I have reviewed all pertinent lab results within the past 24 hours.  CBC:   Recent Labs   Lab 08/28/24  0457   WBC 4.87   RBC 2.63*   HGB 7.4*   HCT 22.8*      MCV 87   MCH 28.1   MCHC 32.5     CMP:   Recent Labs   Lab 08/28/24  0457   GLU 92   CALCIUM 8.0*   *   K 4.6   CO2 21*      BUN 41*   CREATININE 7.7*       Assessment & Plan    ESRD - on dialysis, stable, tolerated well  UGI bleed  PAD  HTN  DM type 2  Anemia of CKD/acute loss    Continue present Rx  HD q MWF  We'll follow for dialysis      Tramadeline Menard  8/28/2024

## 2024-08-28 NOTE — ASSESSMENT & PLAN NOTE
-Admitted to inpatient status  -Presented with GI bleeding  -Has required at least 3 units PRBC an has been treated with PPI drip x 72 hours.  Eliquis has been held.  -GI consulted and input appreciated  -Taken for EGD which showed gastric ulcer with oozing hemorrhage which was treated with clip.    -H/H continued to drop so CTA GI bleeding protocol obtained which showed no active extravasation but did show concerns for oozing. IR consulted and patient underwent left gastric artery coil embolization.   -Today he is feeling well overall, but anxious about if/when to resume his blood thinner.  He reports a black bowel movement today and Hb is relatively stable at 7.7.  -Start PPI q12h which he will need for 8 weeks.  Monitor CBC in AM and if Hb stable may be able to go home with home health.    -Resume eliquis and monitor. Hb has been stable but he reports ongoing melena and bleeding.   - Add on carafate

## 2024-08-28 NOTE — PT/OT/SLP PROGRESS
Physical Therapy      Patient Name:  Miguel Moore   MRN:  [id number]    Patient not seen today secondary to Dialysis. Will follow-up as able later hour/day .  Second attempt, 1634 PM pt requested to rest today 2* to fatigue from Dialysis . Will follow up tomorrow .

## 2024-08-28 NOTE — PROGRESS NOTES
[facility] [place]  Nurses Note -- 4 Eyes      8/27/2024       Skin assessed on: Q Shift      [] No Pressure Injuries Present    []Prevention Measures Documented    [x] Yes LDA  for Pressure Injury Previously documented     [] Yes New Pressure Injury Discovered   [] LDA for New Pressure Injury Added      Attending RN:  Rosemary Pereira RN     Second RN:   Love Poole LPN

## 2024-08-28 NOTE — SUBJECTIVE & OBJECTIVE
Interval History: still with black stool, he is very concerned though Hb remains stable on eliquis and this is likely old from previous bleeding. Discussed with GI and will plan to add carafate    Review of Systems  Objective:     Vital Signs (Most Recent):  Temp: 98.1 °F (36.7 °C) (08/28/24 1430)  Pulse: 92 (08/28/24 1430)  Resp: 16 (08/28/24 1430)  BP: (!) 157/62 (08/28/24 1430)  SpO2: 95 % (08/28/24 1430) Vital Signs (24h Range):  Temp:  [97.2 °F (36.2 °C)-98.8 °F (37.1 °C)] 98.1 °F (36.7 °C)  Pulse:  [69-92] 92  Resp:  [16-18] 16  SpO2:  [95 %-100 %] 95 %  BP: (127-168)/(59-71) 157/62     Weight: 76.5 kg (168 lb 10.4 oz)  Body mass index is 25.64 kg/m².    Intake/Output Summary (Last 24 hours) at 8/28/2024 1602  Last data filed at 8/28/2024 1415  Gross per 24 hour   Intake 710 ml   Output 2500 ml   Net -1790 ml         Physical Exam  Vitals and nursing note reviewed.   Constitutional:       General: He is not in acute distress.     Appearance: He is ill-appearing.   Cardiovascular:      Rate and Rhythm: Normal rate and regular rhythm.      Pulses: Normal pulses.   Pulmonary:      Effort: Pulmonary effort is normal. No respiratory distress.   Abdominal:      General: Bowel sounds are normal. There is no distension.   Musculoskeletal:      Comments: B/l lower extremity wounds, dressing intact   Skin:     General: Skin is warm.   Neurological:      General: No focal deficit present.      Mental Status: He is alert and oriented to person, place, and time.   Psychiatric:         Mood and Affect: Mood normal.             Significant Labs: All pertinent labs within the past 24 hours have been reviewed.    Significant Imaging: I have reviewed all pertinent imaging results/findings within the past 24 hours.

## 2024-08-28 NOTE — PT/OT/SLP PROGRESS
Occupational Therapy      Patient Name:  Miguel Moore   MRN:  [id number]    Patient not seen today secondary to pt HOLLY to dialysis. Will follow-up as able.    8/28/2024

## 2024-08-28 NOTE — NURSING
Patient returned to unit from dialysis via bed, resp even and unlabored, denies pain/discomfort at this time.

## 2024-08-28 NOTE — PLAN OF CARE
Problem: Hemodialysis  Goal: Safe, Effective Therapy Delivery  Outcome: Progressing  Goal: Effective Tissue Perfusion  Outcome: Progressing  Goal: Absence of Infection Signs and Symptoms  Outcome: Progressing     Problem: Adult Inpatient Plan of Care  Goal: Plan of Care Review  Outcome: Progressing  Goal: Patient-Specific Goal (Individualized)  Outcome: Progressing  Goal: Absence of Hospital-Acquired Illness or Injury  Outcome: Progressing  Goal: Optimal Comfort and Wellbeing  Outcome: Progressing  Goal: Readiness for Transition of Care  Outcome: Progressing     Problem: Gastrointestinal Bleeding  Goal: Optimal Coping with Acute Illness  Outcome: Progressing  Goal: Hemostasis  Outcome: Progressing     Problem: Diabetes Comorbidity  Goal: Blood Glucose Level Within Targeted Range  Outcome: Progressing     Problem: Wound  Goal: Optimal Coping  Outcome: Progressing  Goal: Optimal Functional Ability  Outcome: Progressing  Goal: Absence of Infection Signs and Symptoms  Outcome: Progressing  Goal: Improved Oral Intake  Outcome: Progressing  Goal: Optimal Pain Control and Function  Outcome: Progressing  Goal: Skin Health and Integrity  Outcome: Progressing  Goal: Optimal Wound Healing  Outcome: Progressing     Problem: Skin Injury Risk Increased  Goal: Skin Health and Integrity  Outcome: Progressing     Problem: Fall Injury Risk  Goal: Absence of Fall and Fall-Related Injury  Outcome: Progressing

## 2024-08-29 LAB
ANION GAP SERPL CALC-SCNC: 7 MMOL/L (ref 8–16)
BASOPHILS # BLD AUTO: 0.05 K/UL (ref 0–0.2)
BASOPHILS NFR BLD: 1 % (ref 0–1.9)
BUN SERPL-MCNC: 26 MG/DL (ref 8–23)
CALCIUM SERPL-MCNC: 7.9 MG/DL (ref 8.7–10.5)
CHLORIDE SERPL-SCNC: 106 MMOL/L (ref 95–110)
CO2 SERPL-SCNC: 22 MMOL/L (ref 23–29)
CREAT SERPL-MCNC: 5.8 MG/DL (ref 0.5–1.4)
DIFFERENTIAL METHOD BLD: ABNORMAL
EOSINOPHIL # BLD AUTO: 0.3 K/UL (ref 0–0.5)
EOSINOPHIL NFR BLD: 5 % (ref 0–8)
ERYTHROCYTE [DISTWIDTH] IN BLOOD BY AUTOMATED COUNT: 17.2 % (ref 11.5–14.5)
EST. GFR  (NO RACE VARIABLE): 10 ML/MIN/1.73 M^2
GLUCOSE SERPL-MCNC: 90 MG/DL (ref 70–110)
HCT VFR BLD AUTO: 23.2 % (ref 40–54)
HGB BLD-MCNC: 7.6 G/DL (ref 14–18)
IMM GRANULOCYTES # BLD AUTO: 0.02 K/UL (ref 0–0.04)
IMM GRANULOCYTES NFR BLD AUTO: 0.4 % (ref 0–0.5)
LYMPHOCYTES # BLD AUTO: 1.3 K/UL (ref 1–4.8)
LYMPHOCYTES NFR BLD: 23.9 % (ref 18–48)
MCH RBC QN AUTO: 28.4 PG (ref 27–31)
MCHC RBC AUTO-ENTMCNC: 32.8 G/DL (ref 32–36)
MCV RBC AUTO: 87 FL (ref 82–98)
MONOCYTES # BLD AUTO: 0.5 K/UL (ref 0.3–1)
MONOCYTES NFR BLD: 10.2 % (ref 4–15)
NEUTROPHILS # BLD AUTO: 3.1 K/UL (ref 1.8–7.7)
NEUTROPHILS NFR BLD: 59.5 % (ref 38–73)
NRBC BLD-RTO: 0 /100 WBC
PLATELET # BLD AUTO: 165 K/UL (ref 150–450)
PMV BLD AUTO: 9 FL (ref 9.2–12.9)
POTASSIUM SERPL-SCNC: 4.5 MMOL/L (ref 3.5–5.1)
RBC # BLD AUTO: 2.68 M/UL (ref 4.6–6.2)
SODIUM SERPL-SCNC: 135 MMOL/L (ref 136–145)
WBC # BLD AUTO: 5.22 K/UL (ref 3.9–12.7)

## 2024-08-29 PROCEDURE — 97530 THERAPEUTIC ACTIVITIES: CPT | Mod: CQ

## 2024-08-29 PROCEDURE — 25000003 PHARM REV CODE 250: Performed by: HOSPITALIST

## 2024-08-29 PROCEDURE — 80048 BASIC METABOLIC PNL TOTAL CA: CPT | Performed by: HOSPITALIST

## 2024-08-29 PROCEDURE — 63600175 PHARM REV CODE 636 W HCPCS: Performed by: NURSE PRACTITIONER

## 2024-08-29 PROCEDURE — 25000003 PHARM REV CODE 250: Performed by: STUDENT IN AN ORGANIZED HEALTH CARE EDUCATION/TRAINING PROGRAM

## 2024-08-29 PROCEDURE — 97542 WHEELCHAIR MNGMENT TRAINING: CPT | Mod: CQ

## 2024-08-29 PROCEDURE — 11000001 HC ACUTE MED/SURG PRIVATE ROOM

## 2024-08-29 PROCEDURE — 97530 THERAPEUTIC ACTIVITIES: CPT

## 2024-08-29 PROCEDURE — 36415 COLL VENOUS BLD VENIPUNCTURE: CPT | Performed by: HOSPITALIST

## 2024-08-29 PROCEDURE — 85025 COMPLETE CBC W/AUTO DIFF WBC: CPT | Performed by: HOSPITALIST

## 2024-08-29 RX ADMIN — FINASTERIDE 5 MG: 5 TABLET, FILM COATED ORAL at 08:08

## 2024-08-29 RX ADMIN — PANTOPRAZOLE SODIUM 40 MG: 40 INJECTION, POWDER, FOR SOLUTION INTRAVENOUS at 08:08

## 2024-08-29 RX ADMIN — APIXABAN 5 MG: 5 TABLET, FILM COATED ORAL at 09:08

## 2024-08-29 RX ADMIN — SUCRALFATE 1 G: 1 SUSPENSION ORAL at 11:08

## 2024-08-29 RX ADMIN — PANTOPRAZOLE SODIUM 40 MG: 40 INJECTION, POWDER, FOR SOLUTION INTRAVENOUS at 09:08

## 2024-08-29 RX ADMIN — Medication 1 CAPSULE: at 08:08

## 2024-08-29 RX ADMIN — ATORVASTATIN CALCIUM 80 MG: 40 TABLET, FILM COATED ORAL at 08:08

## 2024-08-29 RX ADMIN — TAMSULOSIN HYDROCHLORIDE 0.8 MG: 0.4 CAPSULE ORAL at 08:08

## 2024-08-29 RX ADMIN — SUCRALFATE 1 G: 1 SUSPENSION ORAL at 05:08

## 2024-08-29 RX ADMIN — SEVELAMER CARBONATE 800 MG: 800 TABLET, FILM COATED ORAL at 04:08

## 2024-08-29 RX ADMIN — SUCRALFATE 1 G: 1 SUSPENSION ORAL at 09:08

## 2024-08-29 RX ADMIN — SUCRALFATE 1 G: 1 SUSPENSION ORAL at 04:08

## 2024-08-29 RX ADMIN — SEVELAMER CARBONATE 800 MG: 800 TABLET, FILM COATED ORAL at 11:08

## 2024-08-29 RX ADMIN — SEVELAMER CARBONATE 800 MG: 800 TABLET, FILM COATED ORAL at 08:08

## 2024-08-29 RX ADMIN — APIXABAN 5 MG: 5 TABLET, FILM COATED ORAL at 08:08

## 2024-08-29 NOTE — PROGRESS NOTES
[facility] - Telemetry  Adult Nutrition  Progress Note    SUMMARY     Recommendations  Recommendation:   1. Continue renal diet   2. Add diabetic diet restriction 2000 kcals   3. Discontinue Novasource Renal TID (pt does not like)  4. Add Boost Glucose Control BID   5. Monitor weight and labs    Goals: Pt will consume 50-75% of meals by RD follow up    Nutrition Goal Status: new  Communication of RD Recs:  (POC)    Assessment and Plan  Nutrition Problem  Increased protein needs     Related to (etiology):   ESRD     Signs and Symptoms (as evidenced by):   Pt on HD      Interventions:  Collaboration with other providers  Commercial beverage   Increase protein needs    Nutrition Diagnosis Status:   New    Malnutrition Assessment  Orbital Region (Subcutaneous Fat Loss): well nourished  Upper Arm Region (Subcutaneous Fat Loss): well nourished  Thoracic and Lumbar Region: well nourished   [place] Region (Muscle Loss): well nourished  Clavicle Bone Region (Muscle Loss): well nourished  Clavicle and Acromion Bone Region (Muscle Loss): well nourished  Scapular Bone Region (Muscle Loss): well nourished  Dorsal Hand (Muscle Loss): well nourished  Patellar Region (Muscle Loss): well nourished  Anterior Thigh Region (Muscle Loss): well nourished  Posterior Calf Region (Muscle Loss): well nourished     Reason for Assessment  Reason For Assessment: length of stay  Diagnosis: gastrointestinal disease (GI bleed)  Relevant Medical History: Hemiplegia, ESRD, stroke, HTN, DM2, seziures  Interdisciplinary Rounds: did not attend  General Information Comments: Pt is currently on a renla diet with novasource renal TID. Aiden-14/incision right groin, left heel, right plantar foot, right lateral heel. Noted 100% meal intake. Noted ESRD on HD. Upper GI bleed. Noted ongoing melena and small amount of blood. Noted 25 lb weight loss in 4 months. Pt appears nourished 8/29/24. Attached renal and DM diet educations to pt's discharge notes, per pt  "request.  Nutrition Discharge Planning: d/c on renal diet    Nutrition Risk Screen  Nutrition Risk Screen: no indicators present    Nutrition/Diet History  Patient Reported Diet/Restrictions/Preferences: diabetic diet, renal  Food Preferences: CHAITANYA  Spiritual, Cultural Beliefs, Episcopal Practices, Values that Affect Care: no  Factors Affecting Nutritional Intake: None identified at this time    Nutrition Related Social Determinants of Health: SDOH: Unable to assess at this time.     Anthropometrics  Temp: 97.9 °F (36.6 °C)  Height Method: Stated  Height: 5' 8" (172.7 cm)  Height (inches): 68 in  Weight Method: Bed Scale  Weight: 79 kg (174 lb 2.6 oz)  Weight (lb): 174.17 lb  Ideal Body Weight (IBW), Male: 154 lb  % Ideal Body Weight, Male (lb): 113.1 %  BMI (Calculated): 26.5  BMI Grade: 25 - 29.9 - overweight  Usual Body Weight (UBW), k.4 kg (24)  % Usual Body Weight: 87.57  % Weight Change From Usual Weight: -12.61 %     Lab/Procedures/Meds  Pertinent Labs Reviewed: reviewed  Pertinent Labs Comments: Na 135, CO2 22, BUN 26, Creatinine 5.8, Ca 7.9, GFR 10  Pertinent Medications Reviewed: reviewed  Pertinent Medications Comments: atorvastatin, finasteride, opoetin, pantoprazole, sevelamer, tamsulosin, renal vitamin    Estimated/Assessed Needs  Weight Used For Calorie Calculations: 79 kg (174 lb 2.6 oz)  Energy Calorie Requirements (kcal): 1975 kcals (25 kcals/kg)  Energy Need Method: Kcal/kg  Protein Requirements: 110g (1.4g/kg)  Weight Used For Protein Calculations: 79 kg (174 lb 2.6 oz)     Estimated Fluid Requirement Method: RDA Method  RDA Method (mL):   CHO Requirement: 245g    Nutrition Prescription Ordered  Current Diet Order: Renal Diet  Oral Nutrition Supplement: Novasource Renal TID    Evaluation of Received Nutrient/Fluid Intake  I/O: 350/2600  Energy Calories Required: meeting needs  Protein Required: meeting needs  Fluid Required: meeting needs  Comments: BREA-24  Tolerance: " tolerating  % Intake of Estimated Energy Needs: 75 - 100 %  % Meal Intake: 75 - 100 %    Nutrition Risk  Level of Risk/Frequency of Follow-up:  (1x/weekly)     Monitor and Evaluation  Food and Nutrient Intake: energy intake, food and beverage intake  Food and Nutrient Adminstration: diet order  Anthropometric Measurements: weight, weight change, body mass index  Biochemical Data, Medical Tests and Procedures: gastrointestinal profile, electrolyte and renal panel, inflammatory profile, glucose/endocrine profile, lipid profile     Nutrition Follow-Up  RD Follow-up?: Yes

## 2024-08-29 NOTE — SUBJECTIVE & OBJECTIVE
Interval History: no Bms since yesterday, tolerating carafate thus far.    Review of Systems  Objective:     Vital Signs (Most Recent):  Temp: 97.1 °F (36.2 °C) (08/29/24 1627)  Pulse: 68 (08/29/24 1627)  Resp: 18 (08/29/24 1627)  BP: (!) 125/58 (08/29/24 1627)  SpO2: 100 % (08/29/24 1627) Vital Signs (24h Range):  Temp:  [97.1 °F (36.2 °C)-98.2 °F (36.8 °C)] 97.1 °F (36.2 °C)  Pulse:  [65-90] 68  Resp:  [18] 18  SpO2:  [99 %-100 %] 100 %  BP: (119-136)/(58-63) 125/58     Weight: 79 kg (174 lb 2.6 oz)  Body mass index is 26.48 kg/m².    Intake/Output Summary (Last 24 hours) at 8/29/2024 1632  Last data filed at 8/29/2024 1200  Gross per 24 hour   Intake 360 ml   Output 100 ml   Net 260 ml         Physical Exam  Vitals and nursing note reviewed.   Constitutional:       General: He is not in acute distress.     Appearance: He is ill-appearing.   Cardiovascular:      Rate and Rhythm: Normal rate and regular rhythm.      Pulses: Normal pulses.   Pulmonary:      Effort: Pulmonary effort is normal. No respiratory distress.   Abdominal:      General: Bowel sounds are normal. There is no distension.   Musculoskeletal:      Comments: B/l lower extremity wounds, dressing intact   Skin:     General: Skin is warm.   Neurological:      General: No focal deficit present.      Mental Status: He is alert and oriented to person, place, and time.   Psychiatric:         Mood and Affect: Mood normal.             Significant Labs: All pertinent labs within the past 24 hours have been reviewed.    Significant Imaging: I have reviewed all pertinent imaging results/findings within the past 24 hours.

## 2024-08-29 NOTE — PLAN OF CARE
Problem: Gastrointestinal Bleeding  Goal: Optimal Coping with Acute Illness  8/29/2024 0512 by Ivonne Perez RN  Outcome: Progressing  8/29/2024 0512 by Ivonne Perez RN  Outcome: Progressing  Goal: Hemostasis  8/29/2024 0512 by Ivonne Perez RN  Outcome: Progressing  8/29/2024 0512 by Ivonne Perez RN  Outcome: Progressing  Problem: Diabetes Comorbidity  Goal: Blood Glucose Level Within Targeted Range  8/29/2024 0512 by Ivonne Perez RN  Outcome: Progressing  8/29/2024 0512 by Ivonne Perez RN  Outcome: Progressing     Problem: Skin Injury Risk Increased  Goal: Skin Health and Integrity  8/29/2024 0512 by Ivonne Perez RN  Outcome: Progressing  8/29/2024 0512 by Ivonne Perez RN  Outcome: Progressing

## 2024-08-29 NOTE — NURSING
[facility] [place]  Nurses Note -- 4 Eyes      8/28/2024       Skin assessed on: Q Shift      [] No Pressure Injuries Present    []Prevention Measures Documented    [x] Yes LDA  for Pressure Injury Previously documented     [] Yes New Pressure Injury Discovered   [] LDA for New Pressure Injury Added      Attending RN:  Ivonne Perez RN     Second RN:  Mary Jo Shin RN

## 2024-08-29 NOTE — PT/OT/SLP PROGRESS
Occupational Therapy   Treatment    Name: Miguel Moore  MRN: [id number]  Admitting Diagnosis:  GI bleed  8 Days Post-Op    Recommendations:     Discharge Recommendations: Low Intensity Therapy  Discharge Equipment Recommendations:  none  Barriers to discharge:       Assessment:     Miguel Moore is a 69 y.o. male with a medical diagnosis of GI bleed.  He presents with desire to participate. Performance deficits affecting function are weakness, impaired endurance, impaired self care skills, impaired balance, decreased upper extremity function, decreased lower extremity function, abnormal tone, impaired fine motor, impaired skin, impaired muscle length.     Rehab Prognosis:  Fair; patient would benefit from acute skilled OT services to address these deficits and reach maximum level of function.       Plan:     Patient to be seen 3 x/week to address the above listed problems via self-care/home management, therapeutic activities, therapeutic exercises  Plan of Care Expires: 09/07/24  Plan of Care Reviewed with: patient    Subjective     Chief Complaint:wound healing  Patient/Family Comments/goals: none staed  Pain/Comfort:  Pain Rating 1: 0/10    Objective:     Communicated with:   Patient found supine with telemetry upon OT entry to room.    General Precautions: Standard, fall    Orthopedic Precautions:N/A  Braces: N/A  Respiratory Status: Room air     Occupational Performance:     Bed Mobility:    Patient completed Rolling/Turning to Left with  supervision  Patient completed Scooting/Bridging with supervision  Patient completed Supine to Sit with supervision     Functional Mobility/Transfers:    Functional Mobility: Pt able to sit edge of bed    Activities of Daily Living:  Feeding:  supervision    Upper extremity dressing  moderate assist    AMPAC 6 Click ADL: 15    Treatment & Education:  -Home safety skills reviewed and transfers in the home    Patient left sitting edge of bed with  PTA present    GOALS:    Multidisciplinary Problems       Occupational Therapy Goals          Problem: Occupational Therapy    Goal Priority Disciplines Outcome Interventions   Occupational Therapy Goal     OT, PT/OT Progressing    Description: Goals to be met by: 9/7/24     Patient will increase functional independence with ADLs by performing:    LE Dressing with Minimal Assistance.  Grooming while EOB with Modified [place].  Supine to sit with Modified [place].  Toilet transfer to bedside commode with Minimal Assistance.  Sit to stand transfer with Minimal Assistance.   Upper extremity exercise program x15 reps per handout, with assistance as needed.  Functional transfer: To Be Assessed                            Time Tracking:     OT Date of Treatment: 08/29/24  OT Start Time: 1048  OT Stop Time: 1112  OT Total Time (min): 24 min    Billable Minutes:Therapeutic Activity 24    OT/GENEVA: OT          8/29/2024

## 2024-08-29 NOTE — PROGRESS NOTES
[facility]  Progress Note    Patient Name: Miguel Moore  MRN: [id number]  Patient Class: IP- Inpatient   Admission Date: 8/21/2024  Length of Stay: 8 days  Attending Physician: Bonifacio Reyes MD  Primary Care Provider: Raciel Raymond MD        Subjective:     Principal Problem:GI bleed        HPI:  70 y/o male with a PMHx of DVT, PAF on Eliquis, DMT2, HTN, seizures, stroke, presents to the ER for evaluation of coffee ground emesis and darks stool that started early this morning.  Patient reports 2 episodes of coffee ground emesis and 1 episode of dark stool.  Also reports associated abdominal pain which has now resolved.  No alleviating or aggravating factors.  Patient has not taken his Eliquis today.  He presented to ER where he was noted to be tachycardic.  Labs showing Hgb much lower than baseline.  He denies any fever or chills.  No other complaints.    Overview/Hospital Course:  70 y/o male with a PMHx of DVT, PAF on Eliquis, DMT2, HTN, ESRD, stroke admitted with GI bleed.  Hgb of 5.9, tachycardic and with active bleeding and admitted to ICU.  Started on Protonix drip.  GI consulted.  Nephrology consulted for HD.  Patient was taken for EGD that showed gastric ulcer with oozing hemorrhage, treated with a clip.  Patient was transfused 3 units of blood with good correction of H/H.  Eliquis held.  Will need to continue IV Protonix for 72 hours.until tomorrow.  Wound Care consulted for chronic LE wounds. H/H dropped and he was transfused pRBC. CTA abdomen/pelvis with no active hemorrhage but there is mucosal enhancement of gastric fundus. IR consulted and did angiogram that did not erveal active contrast extravasation but due to ongoing bleeding, elective coil embolization of left gastric artery was done on 8/23/24. Hb stabilized  and he was cleared to be restarted on eliquis. Still with ongoing dark tarry stool but Hb stable. Started on carafate.      Interval History: no Bms  since yesterday, tolerating carafate thus far.    Review of Systems  Objective:     Vital Signs (Most Recent):  Temp: 97.1 °F (36.2 °C) (08/29/24 1627)  Pulse: 68 (08/29/24 1627)  Resp: 18 (08/29/24 1627)  BP: (!) 125/58 (08/29/24 1627)  SpO2: 100 % (08/29/24 1627) Vital Signs (24h Range):  Temp:  [97.1 °F (36.2 °C)-98.2 °F (36.8 °C)] 97.1 °F (36.2 °C)  Pulse:  [65-90] 68  Resp:  [18] 18  SpO2:  [99 %-100 %] 100 %  BP: (119-136)/(58-63) 125/58     Weight: 79 kg (174 lb 2.6 oz)  Body mass index is 26.48 kg/m².    Intake/Output Summary (Last 24 hours) at 8/29/2024 1632  Last data filed at 8/29/2024 1200  Gross per 24 hour   Intake 360 ml   Output 100 ml   Net 260 ml         Physical Exam  Vitals and nursing note reviewed.   Constitutional:       General: He is not in acute distress.     Appearance: He is ill-appearing.   Cardiovascular:      Rate and Rhythm: Normal rate and regular rhythm.      Pulses: Normal pulses.   Pulmonary:      Effort: Pulmonary effort is normal. No respiratory distress.   Abdominal:      General: Bowel sounds are normal. There is no distension.   Musculoskeletal:      Comments: B/l lower extremity wounds, dressing intact   Skin:     General: Skin is warm.   Neurological:      General: No focal deficit present.      Mental Status: He is alert and oriented to person, place, and time.   Psychiatric:         Mood and Affect: Mood normal.             Significant Labs: All pertinent labs within the past 24 hours have been reviewed.    Significant Imaging: I have reviewed all pertinent imaging results/findings within the past 24 hours.    Assessment/Plan:      * GI bleed  -Admitted to inpatient status  -Presented with GI bleeding  -Has required at least 3 units PRBC an has been treated with PPI drip x 72 hours.  Eliquis has been held.  -GI consulted and input appreciated  -Taken for EGD which showed gastric ulcer with oozing hemorrhage which was treated with clip.    -H/H continued to drop so CTA GI  bleeding protocol obtained which showed no active extravasation but did show concerns for oozing. IR consulted and patient underwent left gastric artery coil embolization.   -Today he is feeling well overall, but anxious about if/when to resume his blood thinner.  He reports a black bowel movement today and Hb is relatively stable at 7.7.  -Start PPI q12h which he will need for 8 weeks.  Monitor CBC in AM and if Hb stable may be able to go home with home health.    -Resume eliquis and monitor. Hb has been stable but he reports ongoing melena and bleeding.   - Add on carafate    Gastric ulcer with hemorrhage  -Treatment as above.    Acute blood loss anemia (ABLA)  -Treatment as above.    Wounds, multiple  -Stage 4 right plantar foot wound POA  -Stage 4 right mid plantar foot wound POA  -Unstageable right lateral heel wound POA  -Unstageable left medial heel wound POA    Paroxysmal atrial fibrillation  -Patient with Paroxysmal (<7 days) atrial fibrillation which is controlled.   -Patient is currently in sinus rhythm.[id number] Score: 3  -resume eliquis    Anemia in chronic kidney disease  -Baseline anemia is chronic due to ESRD and acute drop is due to acute blood loss from GI bleeding  -Treatment as above.    Chronic deep vein thrombosis (DVT) of popliteal vein of right lower extremity 9/21/20 outside [place]; unprovoked; anticoagulation  -Noted history  -resume eliquis    ESRD (end stage renal disease)  -Noted ESRD and on HD outpatient MWF  -Nephrology consulted  -Continue HD per nephrology    Hemiplegia and hemiparesis following cerebral infarction affecting right dominant side  -History noted  -PT/OT consulted and recommend home health at discharge    BPH (benign prostatic hyperplasia) 2/18/21 prostate bx normal  -Continue tamsulosin and finasteride    Dyslipidemia  -Continue statin    Benign essential HTN  -BP is normal to slightly elevated  -Home med list has no anti-hypertensive medications  -Continue to monitor for  now.      VTE Risk Mitigation (From admission, onward)           Ordered     apixaban tablet 5 mg  2 times daily         08/26/24 0756     IP VTE HIGH RISK PATIENT  Once         08/21/24 0957     Place sequential compression device  Until discontinued         08/21/24 0957                    Discharge Planning   GARY: 8/30/2024     Code Status: Full Code   Is the patient medically ready for discharge?:     Reason for patient still in hospital (select all that apply): Treatment  Discharge Plan A: Home Health   Discharge Delays: None known at this time              Bonifacio Reyes MD  Department of Hospital Medicine   [facility]

## 2024-08-29 NOTE — PT/OT/SLP PROGRESS
Physical Therapy Treatment    Patient Name:  Miguel Moore   MRN:  [id number]    Recommendations:     Discharge Recommendations: Low Intensity Therapy  Discharge Equipment Recommendations: none  Barriers to discharge: None    Assessment:     Miguel Moore is a 69 y.o. male admitted with a medical diagnosis of GI bleed.  He presents with the following impairments/functional limitations: weakness, impaired self care skills, impaired functional mobility, gait instability, decreased lower extremity function, decreased upper extremity function, decreased coordination, decreased ROM, decreased safety awareness, edema, impaired skin, impaired balance, impaired endurance .    Rehab Prognosis: Fair; patient would benefit from acute skilled PT services to address these deficits and reach maximum level of function.    Recent Surgery: Procedure(s) (LRB):  EGD (ESOPHAGOGASTRODUODENOSCOPY) (N/A) 8 Days Post-Op    Plan:     During this hospitalization, patient to be seen 5 x/week to address the identified rehab impairments via therapeutic activities, therapeutic exercises, gait training, wheelchair management/training, neuromuscular re-education and progress toward the following goals:    Plan of Care Expires:  09/07/24    Subjective     Chief Complaint: none  Patient/Family Comments/goals: pt is agreeable to therapy   Pain/Comfort:  Pain Rating 1: 0/10  Pain Rating Post-Intervention 1: 0/10      Objective:     Communicated with nurse  prior to session.  Patient found sitting edge of bed with OT,  telemetry, peripheral IV upon PT entry to room.     General Precautions: Standard, fall, diabetic  Orthopedic Precautions: N/A  Braces: N/A  Respiratory Status: Room air     Functional Mobility:  Bed Mobility:     Scooting: contact guard assistance and minimum assistance  Sit to Supine: stand by assistance Transfers:  V/T cues for safety, proper technique , L hand placement to push off and RLE  blocked to prevent sliding .   Sit to  Stand: from wheelchair and bed maximal assistance with no AD  Wheelchair <> bed : maximal assistance / total assistance with  no AD  using  Squat Pivot  Pt propelled wheelchair  250 ft x 2 trials with BRENDA Samaniego  . Pt required rest breaks 2* to fatigue, V/T cues for safety awareness .    Balance: good in sitting, poor in standing       AM-PAC 6 CLICK MOBILITY  Turning over in bed (including adjusting bedclothes, sheets and blankets)?: 4  Sitting down on and standing up from a chair with arms (e.g., wheelchair, bedside commode, etc.): 2  Moving from lying on back to sitting on the side of the bed?: 3  Moving to and from a bed to a chair (including a wheelchair)?: 2  Need to walk in hospital room?: 1  Climbing 3-5 steps with a railing?: 1  Basic Mobility Total Score: 13       Treatment & Education:  Pt performed hand hygiene and oral care while sitting up in wheelchair at the sink with set up assistance .    Patient left with bed in chair position with pressure relief boots all lines intact, call button in reach, bed alarm on, and nurse notified..    GOALS:   Multidisciplinary Problems       Physical Therapy Goals          Problem: Physical Therapy    Goal Priority Disciplines Outcome Goal Variances Interventions   Physical Therapy Goal     PT, PT/OT Progressing     Description: Goals to be met by: 24     Patient will increase functional independence with mobility by performin. Supine to sit with Contact Guard Assistance  2. Sit to supine with Contact Guard Assistance  3. Rolling to Left and Right with Supervision.  4. Sit to stand transfer with Contact Guard Assistance  5. Bed to chair transfer with Contact Guard Assistance using Rolling Walker                         Time Tracking:     PT Received On: 24  PT Start Time: 1117     PT Stop Time: 1157  PT Total Time (min): 40 min     Billable Minutes: Therapeutic Activity 23 and Train/Wheelchair Management 17    Treatment Type: Treatment  PT/PTA:  PTA     Number of PTA visits since last PT visit: 1     08/29/2024

## 2024-08-29 NOTE — PLAN OF CARE
Recommendations  Recommendation:   1. Continue renal diet   2. Add diabetic diet restriction 2000 kcals   3. Discontinue Novasource Renal TID (pt does not like)  4. Add Boost Glucose Control BID   5. Monitor weight and labs    Goals: Pt will consume 50-75% of meals by RD follow up    Nutrition Goal Status: new  Communication of RD Recs:  (POC)

## 2024-08-29 NOTE — PLAN OF CARE
Problem: Gastrointestinal Bleeding  Goal: Optimal Coping with Acute Illness  Outcome: Progressing  Goal: Hemostasis  Outcome: Progressing     Problem: Diabetes Comorbidity  Goal: Blood Glucose Level Within Targeted Range  Outcome: Progressing     Problem: Wound  Goal: Optimal Coping  Outcome: Progressing  Goal: Optimal Functional Ability  Outcome: Progressing  Goal: Absence of Infection Signs and Symptoms  Outcome: Progressing  Goal: Improved Oral Intake  Outcome: Progressing  Goal: Optimal Pain Control and Function  Outcome: Progressing  Goal: Skin Health and Integrity  Outcome: Progressing  Goal: Optimal Wound Healing  Outcome: Progressing

## 2024-08-30 LAB
ANION GAP SERPL CALC-SCNC: 7 MMOL/L (ref 8–16)
BASOPHILS # BLD AUTO: 0.03 K/UL (ref 0–0.2)
BASOPHILS NFR BLD: 0.6 % (ref 0–1.9)
BUN SERPL-MCNC: 37 MG/DL (ref 8–23)
CALCIUM SERPL-MCNC: 8 MG/DL (ref 8.7–10.5)
CHLORIDE SERPL-SCNC: 106 MMOL/L (ref 95–110)
CO2 SERPL-SCNC: 22 MMOL/L (ref 23–29)
CREAT SERPL-MCNC: 7.2 MG/DL (ref 0.5–1.4)
DIFFERENTIAL METHOD BLD: ABNORMAL
EOSINOPHIL # BLD AUTO: 0.3 K/UL (ref 0–0.5)
EOSINOPHIL NFR BLD: 5.5 % (ref 0–8)
ERYTHROCYTE [DISTWIDTH] IN BLOOD BY AUTOMATED COUNT: 17.1 % (ref 11.5–14.5)
EST. GFR  (NO RACE VARIABLE): 8 ML/MIN/1.73 M^2
GLUCOSE SERPL-MCNC: 105 MG/DL (ref 70–110)
HCT VFR BLD AUTO: 24.6 % (ref 40–54)
HGB BLD-MCNC: 7.9 G/DL (ref 14–18)
IMM GRANULOCYTES # BLD AUTO: 0.01 K/UL (ref 0–0.04)
IMM GRANULOCYTES NFR BLD AUTO: 0.2 % (ref 0–0.5)
LYMPHOCYTES # BLD AUTO: 1.3 K/UL (ref 1–4.8)
LYMPHOCYTES NFR BLD: 26.7 % (ref 18–48)
MCH RBC QN AUTO: 28.1 PG (ref 27–31)
MCHC RBC AUTO-ENTMCNC: 32.1 G/DL (ref 32–36)
MCV RBC AUTO: 88 FL (ref 82–98)
MONOCYTES # BLD AUTO: 0.5 K/UL (ref 0.3–1)
MONOCYTES NFR BLD: 9.5 % (ref 4–15)
NEUTROPHILS # BLD AUTO: 2.8 K/UL (ref 1.8–7.7)
NEUTROPHILS NFR BLD: 57.5 % (ref 38–73)
NRBC BLD-RTO: 0 /100 WBC
PLATELET # BLD AUTO: 211 K/UL (ref 150–450)
PMV BLD AUTO: 9.8 FL (ref 9.2–12.9)
POTASSIUM SERPL-SCNC: 4.6 MMOL/L (ref 3.5–5.1)
RBC # BLD AUTO: 2.81 M/UL (ref 4.6–6.2)
SODIUM SERPL-SCNC: 135 MMOL/L (ref 136–145)
WBC # BLD AUTO: 4.94 K/UL (ref 3.9–12.7)

## 2024-08-30 PROCEDURE — 63600175 PHARM REV CODE 636 W HCPCS: Performed by: NURSE PRACTITIONER

## 2024-08-30 PROCEDURE — 11000001 HC ACUTE MED/SURG PRIVATE ROOM

## 2024-08-30 PROCEDURE — 80048 BASIC METABOLIC PNL TOTAL CA: CPT | Performed by: HOSPITALIST

## 2024-08-30 PROCEDURE — 25000003 PHARM REV CODE 250: Performed by: HOSPITALIST

## 2024-08-30 PROCEDURE — 63600175 PHARM REV CODE 636 W HCPCS: Mod: JZ,JG | Performed by: STUDENT IN AN ORGANIZED HEALTH CARE EDUCATION/TRAINING PROGRAM

## 2024-08-30 PROCEDURE — 90935 HEMODIALYSIS ONE EVALUATION: CPT

## 2024-08-30 PROCEDURE — 85025 COMPLETE CBC W/AUTO DIFF WBC: CPT | Performed by: HOSPITALIST

## 2024-08-30 PROCEDURE — 36415 COLL VENOUS BLD VENIPUNCTURE: CPT | Performed by: HOSPITALIST

## 2024-08-30 PROCEDURE — 25000003 PHARM REV CODE 250: Performed by: STUDENT IN AN ORGANIZED HEALTH CARE EDUCATION/TRAINING PROGRAM

## 2024-08-30 RX ADMIN — ATORVASTATIN CALCIUM 80 MG: 40 TABLET, FILM COATED ORAL at 08:08

## 2024-08-30 RX ADMIN — PANTOPRAZOLE SODIUM 40 MG: 40 INJECTION, POWDER, FOR SOLUTION INTRAVENOUS at 08:08

## 2024-08-30 RX ADMIN — APIXABAN 5 MG: 5 TABLET, FILM COATED ORAL at 09:08

## 2024-08-30 RX ADMIN — SEVELAMER CARBONATE 800 MG: 800 TABLET, FILM COATED ORAL at 04:08

## 2024-08-30 RX ADMIN — APIXABAN 5 MG: 5 TABLET, FILM COATED ORAL at 08:08

## 2024-08-30 RX ADMIN — SEVELAMER CARBONATE 800 MG: 800 TABLET, FILM COATED ORAL at 08:08

## 2024-08-30 RX ADMIN — SUCRALFATE 1 G: 1 SUSPENSION ORAL at 09:08

## 2024-08-30 RX ADMIN — SUCRALFATE 1 G: 1 SUSPENSION ORAL at 05:08

## 2024-08-30 RX ADMIN — Medication 1 CAPSULE: at 08:08

## 2024-08-30 RX ADMIN — TAMSULOSIN HYDROCHLORIDE 0.8 MG: 0.4 CAPSULE ORAL at 08:08

## 2024-08-30 RX ADMIN — FINASTERIDE 5 MG: 5 TABLET, FILM COATED ORAL at 08:08

## 2024-08-30 RX ADMIN — PANTOPRAZOLE SODIUM 40 MG: 40 INJECTION, POWDER, FOR SOLUTION INTRAVENOUS at 09:08

## 2024-08-30 RX ADMIN — EPOETIN ALFA-EPBX 10000 UNITS: 10000 INJECTION, SOLUTION INTRAVENOUS; SUBCUTANEOUS at 08:08

## 2024-08-30 RX ADMIN — SUCRALFATE 1 G: 1 SUSPENSION ORAL at 04:08

## 2024-08-30 NOTE — PROGRESS NOTES
[facility] Medicine  Progress Note    Patient Name: Miguel Moore  MRN: [id number]  Patient Class: IP- Inpatient   Admission Date: 8/21/2024  Length of Stay: 9 days  Attending Physician: Bonifacio Reyes MD  Primary Care Provider: Raciel Raymond MD        Subjective:     Principal Problem:GI bleed        HPI:  70 y/o male with a PMHx of DVT, PAF on Eliquis, DMT2, HTN, seizures, stroke, presents to the ER for evaluation of coffee ground emesis and darks stool that started early this morning.  Patient reports 2 episodes of coffee ground emesis and 1 episode of dark stool.  Also reports associated abdominal pain which has now resolved.  No alleviating or aggravating factors.  Patient has not taken his Eliquis today.  He presented to ER where he was noted to be tachycardic.  Labs showing Hgb much lower than baseline.  He denies any fever or chills.  No other complaints.    Overview/Hospital Course:  70 y/o male with a PMHx of DVT, PAF on Eliquis, DMT2, HTN, ESRD, stroke admitted with GI bleed.  Hgb of 5.9, tachycardic and with active bleeding and admitted to ICU.  Started on Protonix drip.  GI consulted.  Nephrology consulted for HD.  Patient was taken for EGD that showed gastric ulcer with oozing hemorrhage, treated with a clip.  Patient was transfused 3 units of blood with good correction of H/H.  Eliquis held.  Will need to continue IV Protonix for 72 hours.until tomorrow.  Wound Care consulted for chronic LE wounds. H/H dropped and he was transfused pRBC. CTA abdomen/pelvis with no active hemorrhage but there is mucosal enhancement of gastric fundus. IR consulted and did angiogram that did not erveal active contrast extravasation but due to ongoing bleeding, elective coil embolization of left gastric artery was done on 8/23/24. Hb stabilized  and he was cleared to be restarted on eliquis. Still with ongoing dark tarry stool but Hb stable. Started on carafate.      Interval History:  After  dialysis, feeling well and has had not had any bowel movements.  Planning for discharge home tomorrow    Review of Systems  Objective:     Vital Signs (Most Recent):  Temp: 98.7 °F (37.1 °C) (08/30/24 1421)  Pulse: 80 (08/30/24 1527)  Resp: 20 (08/30/24 1421)  BP: 138/63 (08/30/24 1421)  SpO2: (!) 93 % (08/30/24 1421) Vital Signs (24h Range):  Temp:  [97.6 °F (36.4 °C)-98.9 °F (37.2 °C)] 98.7 °F (37.1 °C)  Pulse:  [66-80] 80  Resp:  [17-20] 20  SpO2:  [93 %-100 %] 93 %  BP: (121-158)/(52-67) 138/63     Weight: 80.5 kg (177 lb 7.5 oz)  Body mass index is 26.98 kg/m².    Intake/Output Summary (Last 24 hours) at 8/30/2024 1639  Last data filed at 8/30/2024 1518  Gross per 24 hour   Intake 600 ml   Output 2575 ml   Net -1975 ml         Physical Exam  Vitals and nursing note reviewed.   Constitutional:       General: He is not in acute distress.     Appearance: He is ill-appearing.   Cardiovascular:      Rate and Rhythm: Normal rate and regular rhythm.      Pulses: Normal pulses.   Pulmonary:      Effort: Pulmonary effort is normal. No respiratory distress.   Abdominal:      General: Bowel sounds are normal. There is no distension.   Musculoskeletal:      Comments: B/l lower extremity wounds, dressing intact   Skin:     General: Skin is warm.   Neurological:      General: No focal deficit present.      Mental Status: He is alert and oriented to person, place, and time.   Psychiatric:         Mood and Affect: Mood normal.             Significant Labs: All pertinent labs within the past 24 hours have been reviewed.    Significant Imaging: I have reviewed all pertinent imaging results/findings within the past 24 hours.    Assessment/Plan:      * GI bleed  -Admitted to inpatient status  -Presented with GI bleeding  -Has required at least 3 units PRBC an has been treated with PPI drip x 72 hours.  Eliquis has been held.  -GI consulted and input appreciated  -Taken for EGD which showed gastric ulcer with oozing hemorrhage  which was treated with clip.    -H/H continued to drop so CTA GI bleeding protocol obtained which showed no active extravasation but did show concerns for oozing. IR consulted and patient underwent left gastric artery coil embolization.   -Today he is feeling well overall, but anxious about if/when to resume his blood thinner.  He reports a black bowel movement today and Hb is relatively stable at 7.7.  -Start PPI q12h which he will need for 8 weeks.  Monitor CBC in AM and if Hb stable may be able to go home with home health.    -Resume eliquis and monitor. Hb has been stable but he reports ongoing melena and bleeding.   - Add on carafate    Gastric ulcer with hemorrhage  -Treatment as above.    Acute blood loss anemia (ABLA)  -Treatment as above.    Wounds, multiple  -Stage 4 right plantar foot wound POA  -Stage 4 right mid plantar foot wound POA  -Unstageable right lateral heel wound POA  -Unstageable left medial heel wound POA    Paroxysmal atrial fibrillation  -Patient with Paroxysmal (<7 days) atrial fibrillation which is controlled.   -Patient is currently in sinus rhythm.[id number] Score: 3  -resume eliquis    Anemia in chronic kidney disease  -Baseline anemia is chronic due to ESRD and acute drop is due to acute blood loss from GI bleeding  -Treatment as above.    Chronic deep vein thrombosis (DVT) of popliteal vein of right lower extremity 9/21/20 outside [place]; unprovoked; anticoagulation  -Noted history  -resume eliquis    ESRD (end stage renal disease)  -Noted ESRD and on HD outpatient MWF  -Nephrology consulted  -Continue HD per nephrology    Hemiplegia and hemiparesis following cerebral infarction affecting right dominant side  -History noted  -PT/OT consulted and recommend home health at discharge    BPH (benign prostatic hyperplasia) 2/18/21 prostate bx normal  -Continue tamsulosin and finasteride    Dyslipidemia  -Continue statin    Benign essential HTN  -BP is normal to slightly elevated  -Home med  list has no anti-hypertensive medications  -Continue to monitor for now.      VTE Risk Mitigation (From admission, onward)           Ordered     apixaban tablet 5 mg  2 times daily         08/26/24 0756     IP VTE HIGH RISK PATIENT  Once         08/21/24 0957     Place sequential compression device  Until discontinued         08/21/24 0957                    Discharge Planning   GARY: 8/30/2024     Code Status: Full Code   Is the patient medically ready for discharge?:     Reason for patient still in hospital (select all that apply): Treatment  Discharge Plan A: Home Health   Discharge Delays: None known at this time              Bonifacio Reyes MD  Department of Hospital Medicine   [facility] [place] [facility], [facility]

## 2024-08-30 NOTE — PLAN OF CARE
Problem: Gastrointestinal Bleeding  Goal: Optimal Coping with Acute Illness  Outcome: Progressing  Goal: Hemostasis  Outcome: Progressing     Problem: Skin Injury Risk Increased  Goal: Skin Health and Integrity  Outcome: Progressing

## 2024-08-30 NOTE — PLAN OF CARE
Problem: Hemodialysis  Goal: Safe, Effective Therapy Delivery  Intervention: Optimize Device Care and Function  Flowsheets (Taken 8/30/2024 1823)  Medication Review/Management: medications reviewed  Goal: Absence of Infection Signs and Symptoms  Intervention: Prevent or Manage Infection  Flowsheets (Taken 8/30/2024 1823)  Infection Prevention: environmental surveillance performed  Infection Management: aseptic technique maintained     Problem: Adult Inpatient Plan of Care  Goal: Plan of Care Review  Flowsheets (Taken 8/30/2024 1823)  Plan of Care Reviewed With: patient  Goal: Absence of Hospital-Acquired Illness or Injury  Intervention: Identify and Manage Fall Risk  Flowsheets (Taken 8/30/2024 1823)  Safety Promotion/Fall Prevention:   assistive device/personal item within reach   bed alarm set   Fall Risk reviewed with patient/family  Intervention: Prevent Skin Injury  Flowsheets (Taken 8/30/2024 1823)  Body Position: position changed independently  Intervention: Prevent Infection  Flowsheets (Taken 8/30/2024 1823)  Infection Prevention: environmental surveillance performed  Goal: Optimal Comfort and Wellbeing  Intervention: Provide Person-Centered Care  Flowsheets (Taken 8/30/2024 1823)  Trust Relationship/Rapport:   care explained   questions answered

## 2024-08-30 NOTE — NURSING
Patient left unit via bed headed to dialysis. Patient accompanied by transport. Patient AAOx4, NAD.

## 2024-08-30 NOTE — NURSING
Report received from enrrique Coronado RN. Patient resting quietly, no apparent distress noted at this time. Wheels locked in lowest position, call light within reach, Telemetry monitoring in place.    [facility], [facility] - [place]  Nurses Note -- 4 Eyes      8/30/2024       Skin assessed on: Q Shift      [] No Pressure Injuries Present    [x]Prevention Measures Documented    [x] Yes LDA  for Pressure Injury Previously documented     [] Yes New Pressure Injury Discovered   [] LDA for New Pressure Injury Added      Attending RN:  Danielle Ryan LPN     Second RN:  DARRION Coronado

## 2024-08-30 NOTE — PROGRESS NOTES
Miguel Moore is a 69 y.o. male patient.    Follow for ESRD, dialysis    Patient seen while on dialysis  No new c/o, comfortable    Scheduled Meds:   apixaban  5 mg Oral BID    atorvastatin  80 mg Oral Daily    finasteride  5 mg Oral Daily    pantoprazole  40 mg Intravenous BID    sevelamer carbonate  800 mg Oral TID WM    sucralfate  1 g Oral QID (AC & HS)    tamsulosin  2 capsule Oral Daily    vitamin renal formula (B-complex-vitamin c-folic acid)  1 capsule Oral Daily       Review of patient's allergies indicates:   Allergen Reactions    Ace inhibitors      Hyperkalemia 8/2018  Other reaction(s): Unknown    Penicillins Hives     Tolerated cefepime and cefdinir previously    Tizanidine      Felt hot         Vital Signs Range (Last 24H):  Temp:  [97.1 °F (36.2 °C)-98.4 °F (36.9 °C)]   Pulse:  [66-77]   Resp:  [18-20]   BP: (121-156)/(57-67)   SpO2:  [99 %-100 %]     I & O (Last 24H):  Intake/Output Summary (Last 24 hours) at 8/30/2024 1013  Last data filed at 8/30/2024 0600  Gross per 24 hour   Intake 480 ml   Output 75 ml   Net 405 ml           Physical Exam:  General appearance: well developed, no distress  Lungs:  clear to auscultation bilaterally and normal respiratory effort  Heart: regular rate and rhythm  Abdomen:  soft, (+) bowel sounds  Extremities: edema trace    Laboratory:  I have reviewed all pertinent lab results within the past 24 hours.  CBC:   Recent Labs   Lab 08/30/24  0430   WBC 4.94   RBC 2.81*   HGB 7.9*   HCT 24.6*      MCV 88   MCH 28.1   MCHC 32.1     CMP:   Recent Labs   Lab 08/30/24  0430      CALCIUM 8.0*   *   K 4.6   CO2 22*      BUN 37*   CREATININE 7.2*       Assessment & Plan    ESRD - stable on dialysis, tolerated well  UGI bleed  PAD  HTN  DM type 2  Anemia in CKD/acute loss    Continue present RX  [facility] will no longer provide inpatient nephrology service here after today  HD q MWF  Discussed with Dr. Flynn, she has accepted patient to her  service  Thanks         Nils Menard  8/30/2024

## 2024-08-30 NOTE — SUBJECTIVE & OBJECTIVE
Interval History:  After dialysis, feeling well and has had not had any bowel movements.  Planning for discharge home tomorrow    Review of Systems  Objective:     Vital Signs (Most Recent):  Temp: 98.7 °F (37.1 °C) (08/30/24 1421)  Pulse: 80 (08/30/24 1527)  Resp: 20 (08/30/24 1421)  BP: 138/63 (08/30/24 1421)  SpO2: (!) 93 % (08/30/24 1421) Vital Signs (24h Range):  Temp:  [97.6 °F (36.4 °C)-98.9 °F (37.2 °C)] 98.7 °F (37.1 °C)  Pulse:  [66-80] 80  Resp:  [17-20] 20  SpO2:  [93 %-100 %] 93 %  BP: (121-158)/(52-67) 138/63     Weight: 80.5 kg (177 lb 7.5 oz)  Body mass index is 26.98 kg/m².    Intake/Output Summary (Last 24 hours) at 8/30/2024 1639  Last data filed at 8/30/2024 1518  Gross per 24 hour   Intake 600 ml   Output 2575 ml   Net -1975 ml         Physical Exam  Vitals and nursing note reviewed.   Constitutional:       General: He is not in acute distress.     Appearance: He is ill-appearing.   Cardiovascular:      Rate and Rhythm: Normal rate and regular rhythm.      Pulses: Normal pulses.   Pulmonary:      Effort: Pulmonary effort is normal. No respiratory distress.   Abdominal:      General: Bowel sounds are normal. There is no distension.   Musculoskeletal:      Comments: B/l lower extremity wounds, dressing intact   Skin:     General: Skin is warm.   Neurological:      General: No focal deficit present.      Mental Status: He is alert and oriented to person, place, and time.   Psychiatric:         Mood and Affect: Mood normal.             Significant Labs: All pertinent labs within the past 24 hours have been reviewed.    Significant Imaging: I have reviewed all pertinent imaging results/findings within the past 24 hours.

## 2024-08-30 NOTE — NURSING
[facility], [facility] - [place]  Nurses Note -- 4 Eyes      8/30/2024       Skin assessed on: Q Shift      [] No Pressure Injuries Present    [x]Prevention Measures Documented    [x] Yes LDA  for Pressure Injury Previously documented     [] Yes New Pressure Injury Discovered   [] LDA for New Pressure Injury Added      Attending RN:  Ivonne Perez, RN     Second RN:  Ana Vargas LPN

## 2024-08-30 NOTE — PT/OT/SLP PROGRESS
Physical Therapy     Patient Name:  Miguel Moore   MRN:  [id number]     Patient not seen today secondary to Dialysis. Will follow-up as able later hour/day .

## 2024-08-31 VITALS
SYSTOLIC BLOOD PRESSURE: 157 MMHG | OXYGEN SATURATION: 100 % | BODY MASS INDEX: 26.9 KG/M2 | WEIGHT: 177.5 LBS | RESPIRATION RATE: 18 BRPM | TEMPERATURE: 98 F | HEART RATE: 68 BPM | DIASTOLIC BLOOD PRESSURE: 70 MMHG | HEIGHT: 68 IN

## 2024-08-31 LAB
ANION GAP SERPL CALC-SCNC: 8 MMOL/L (ref 8–16)
BASOPHILS # BLD AUTO: 0.03 K/UL (ref 0–0.2)
BASOPHILS NFR BLD: 0.6 % (ref 0–1.9)
BUN SERPL-MCNC: 26 MG/DL (ref 8–23)
CALCIUM SERPL-MCNC: 8.3 MG/DL (ref 8.7–10.5)
CHLORIDE SERPL-SCNC: 108 MMOL/L (ref 95–110)
CO2 SERPL-SCNC: 22 MMOL/L (ref 23–29)
CREAT SERPL-MCNC: 5.2 MG/DL (ref 0.5–1.4)
DIFFERENTIAL METHOD BLD: ABNORMAL
EOSINOPHIL # BLD AUTO: 0.3 K/UL (ref 0–0.5)
EOSINOPHIL NFR BLD: 5.8 % (ref 0–8)
ERYTHROCYTE [DISTWIDTH] IN BLOOD BY AUTOMATED COUNT: 17.2 % (ref 11.5–14.5)
EST. GFR  (NO RACE VARIABLE): 11 ML/MIN/1.73 M^2
GLUCOSE SERPL-MCNC: 107 MG/DL (ref 70–110)
HCT VFR BLD AUTO: 25.3 % (ref 40–54)
HGB BLD-MCNC: 8.2 G/DL (ref 14–18)
IMM GRANULOCYTES # BLD AUTO: 0.01 K/UL (ref 0–0.04)
IMM GRANULOCYTES NFR BLD AUTO: 0.2 % (ref 0–0.5)
LYMPHOCYTES # BLD AUTO: 1.1 K/UL (ref 1–4.8)
LYMPHOCYTES NFR BLD: 23.3 % (ref 18–48)
MCH RBC QN AUTO: 28.4 PG (ref 27–31)
MCHC RBC AUTO-ENTMCNC: 32.4 G/DL (ref 32–36)
MCV RBC AUTO: 88 FL (ref 82–98)
MONOCYTES # BLD AUTO: 0.4 K/UL (ref 0.3–1)
MONOCYTES NFR BLD: 7.4 % (ref 4–15)
NEUTROPHILS # BLD AUTO: 3.1 K/UL (ref 1.8–7.7)
NEUTROPHILS NFR BLD: 62.7 % (ref 38–73)
NRBC BLD-RTO: 0 /100 WBC
PLATELET # BLD AUTO: 225 K/UL (ref 150–450)
PMV BLD AUTO: 9.5 FL (ref 9.2–12.9)
POTASSIUM SERPL-SCNC: 4.6 MMOL/L (ref 3.5–5.1)
RBC # BLD AUTO: 2.89 M/UL (ref 4.6–6.2)
SODIUM SERPL-SCNC: 138 MMOL/L (ref 136–145)
WBC # BLD AUTO: 4.86 K/UL (ref 3.9–12.7)

## 2024-08-31 PROCEDURE — 80048 BASIC METABOLIC PNL TOTAL CA: CPT | Performed by: HOSPITALIST

## 2024-08-31 PROCEDURE — 85025 COMPLETE CBC W/AUTO DIFF WBC: CPT | Performed by: HOSPITALIST

## 2024-08-31 PROCEDURE — 25000003 PHARM REV CODE 250: Performed by: HOSPITALIST

## 2024-08-31 PROCEDURE — 25000003 PHARM REV CODE 250: Performed by: STUDENT IN AN ORGANIZED HEALTH CARE EDUCATION/TRAINING PROGRAM

## 2024-08-31 PROCEDURE — 63600175 PHARM REV CODE 636 W HCPCS: Performed by: NURSE PRACTITIONER

## 2024-08-31 PROCEDURE — 36415 COLL VENOUS BLD VENIPUNCTURE: CPT | Performed by: HOSPITALIST

## 2024-08-31 RX ORDER — PANTOPRAZOLE SODIUM 40 MG/1
40 TABLET, DELAYED RELEASE ORAL 2 TIMES DAILY
Qty: 180 TABLET | Refills: 3 | Status: SHIPPED | OUTPATIENT
Start: 2024-08-31 | End: 2025-08-31

## 2024-08-31 RX ORDER — SUCRALFATE 1 G/10ML
1 SUSPENSION ORAL
Qty: 400 ML | Refills: 0 | Status: SHIPPED | OUTPATIENT
Start: 2024-08-31 | End: 2024-09-10

## 2024-08-31 RX ADMIN — PANTOPRAZOLE SODIUM 40 MG: 40 INJECTION, POWDER, FOR SOLUTION INTRAVENOUS at 08:08

## 2024-08-31 RX ADMIN — SEVELAMER CARBONATE 800 MG: 800 TABLET, FILM COATED ORAL at 08:08

## 2024-08-31 RX ADMIN — SEVELAMER CARBONATE 800 MG: 800 TABLET, FILM COATED ORAL at 12:08

## 2024-08-31 RX ADMIN — Medication 1 CAPSULE: at 08:08

## 2024-08-31 RX ADMIN — APIXABAN 5 MG: 5 TABLET, FILM COATED ORAL at 08:08

## 2024-08-31 RX ADMIN — FINASTERIDE 5 MG: 5 TABLET, FILM COATED ORAL at 08:08

## 2024-08-31 RX ADMIN — TAMSULOSIN HYDROCHLORIDE 0.8 MG: 0.4 CAPSULE ORAL at 08:08

## 2024-08-31 RX ADMIN — SUCRALFATE 1 G: 1 SUSPENSION ORAL at 05:08

## 2024-08-31 RX ADMIN — ATORVASTATIN CALCIUM 80 MG: 40 TABLET, FILM COATED ORAL at 08:08

## 2024-08-31 RX ADMIN — SUCRALFATE 1 G: 1 SUSPENSION ORAL at 11:08

## 2024-08-31 NOTE — PLAN OF CARE
Case Management Final Discharge Note      Discharge Disposition: Home with home health ([facility])    New DME ordered / company name: none    Relevant SDOH / Transition of Care Barriers:  none    Primary Caretaker and contact information: Maddy Valdes (Daughter)  [phone] (Mobile)     Scheduled followup appointment: PCP hospital follow-up appointment was not available. Office will contact patient to schedule    Referrals placed: ambulatory referral to Outpatient Case management    Transportation: ADT 30 order placed for [employer].  Requested  time: 12:00pm  If transportation does not arrive at ETA time nurse will be instructed to follow protocol for transportation below:  How can I get in touch directly with dispatch, if needed?                 Non-emergent (stretcher): [phone]  option 6     ++NURSING:  If Stretcher does not arrive at requested time please call the above Non Emergent Dispatcher.  If issue not resolved please escalate to your charge nurse for further instructions.      Patient and family educated on discharge services and updated on DC plan. Bedside RN notified, patient clear to discharge from Case Management Perspective.      08/31/24 0924   Final Note   Assessment Type Final Discharge Note   Anticipated Discharge Disposition Home-Health  ([facility])   What phone number can be called within the next 1-3 days to see how you are doing after discharge? [phone]   Hospital Resources/Appts/Education Provided Appointments scheduled and added to AVS;Post-Acute resouces added to AVS   Post-Acute Status   Post-Acute Authorization Home Health   Home Health Status Set-up Complete/Auth obtained   Patient choice form signed by patient/caregiver List with quality metrics by geographic area provided   Discharge Delays None known at this time

## 2024-08-31 NOTE — PLAN OF CARE
Problem: Hemodialysis  Goal: Safe, Effective Therapy Delivery  Outcome: Met  Goal: Effective Tissue Perfusion  Outcome: Met  Goal: Absence of Infection Signs and Symptoms  Outcome: Met     Problem: Adult Inpatient Plan of Care  Goal: Plan of Care Review  Outcome: Met  Goal: Patient-Specific Goal (Individualized)  Outcome: Met  Goal: Absence of Hospital-Acquired Illness or Injury  Outcome: Met  Goal: Optimal Comfort and Wellbeing  Outcome: Met  Goal: Readiness for Transition of Care  Outcome: Met     Problem: Gastrointestinal Bleeding  Goal: Optimal Coping with Acute Illness  Outcome: Met  Goal: Hemostasis  Outcome: Met     Problem: Diabetes Comorbidity  Goal: Blood Glucose Level Within Targeted Range  Outcome: Met     Problem: Wound  Goal: Optimal Coping  Outcome: Met  Goal: Optimal Functional Ability  Outcome: Met  Goal: Absence of Infection Signs and Symptoms  Outcome: Met  Goal: Improved Oral Intake  Outcome: Met  Goal: Optimal Pain Control and Function  Outcome: Met  Goal: Skin Health and Integrity  Outcome: Met  Goal: Optimal Wound Healing  Outcome: Met     Problem: Skin Injury Risk Increased  Goal: Skin Health and Integrity  Outcome: Met     Problem: Fall Injury Risk  Goal: Absence of Fall and Fall-Related Injury  Outcome: Met

## 2024-08-31 NOTE — CONSULTS
"Consult Note  Nephrology    Consult Requested By: Bonifacio Reyes MD  Reason for Consult: ESRD on HD, management    SUBJECTIVE:     History of Present Illness:  Patient is a 69 y.o. male presents with PMH significant for: ESRD on HD ([facility]) A-fib. On AOCs, DVT, DM-2, HTN, PAD, Hx. CVA and Seizure disorder who came to the ED for evaluation of hematemesis and hematochezia that started 8/21/24. As per H&P: "Patient reports of 2 episodes of hematemesis and 1 episode of hematochezia. Also reports of associated abdominal pain, which has now resolved. Reports that he was in a normal state of health prior to bed last night. Patient denies cough, shortness of breath, chest pain, fever, chills, diarrhea, dysuria, headaches, congestion, sore throat, arm or leg trouble, eye pain, ear pain, rash, or other associated symptoms".        Past Medical History:   Diagnosis Date    Allergy     Clotting disorder     Elaquis and APlavix    Deep vein thrombosis     Diabetes mellitus, type 2     Hypertension     Nuclear sclerosis of both eyes 6/9/2017    Renal disorder     Seizures     Stroke     Urinary tract infection      Past Surgical History:   Procedure Laterality Date    CATARACT EXTRACTION W/  INTRAOCULAR LENS IMPLANT Right 10/05/2017    Dr. Tay    CATARACT EXTRACTION W/  INTRAOCULAR LENS IMPLANT Left 10/19/2017    Dr. Tay    CYSTOSCOPY W/ RETROGRADES Bilateral 2/18/2021    Procedure: CYSTOSCOPY, WITH RETROGRADE PYELOGRAM;  Surgeon: JEMMA Styles MD;  Location: [facility] OR;  Service: Urology;  Laterality: Bilateral;  REQUESTING EARLY AS POSSIBE-LO / 2/8/2021 @ 1:13PM  RN Pre Op 2-11-21, Covid NEGATIVE ON  2-17-21.  C A    ESOPHAGOGASTRODUODENOSCOPY N/A 8/17/2020    Procedure: EGD (ESOPHAGOGASTRODUODENOSCOPY);  Surgeon: Desmond Chapman MD;  Location: [facility] ENDO;  Service: Endoscopy;  Laterality: N/A;    ESOPHAGOGASTRODUODENOSCOPY N/A 8/21/2024    Procedure: EGD (ESOPHAGOGASTRODUODENOSCOPY);  Surgeon: Tonie Chauhan, " MD;  Location: [facility] ENDO;  Service: Endoscopy;  Laterality: N/A;    EYE SURGERY Bilateral     cataract    FEMORAL ARTERY STENT      FRACTURE SURGERY      INCISION AND DRAINAGE, LOWER EXTREMITY Right 4/4/2024    Procedure: INCISION AND DRAINAGE, LOWER EXTREMITY;  Surgeon: Jimbo Montilla III, MD;  Location: [facility] OR;  Service: Orthopedics;  Laterality: Right;    INCISION AND DRAINAGE, LOWER EXTREMITY Right 4/6/2024    Procedure: INCISION AND DRAINAGE, LOWER EXTREMITY;  Surgeon: Desmond Barillas MD;  Location: [facility] OR;  Service: Orthopedics;  Laterality: Right;  foot and ankle    INCISION AND DRAINAGE, LOWER EXTREMITY Right 4/9/2024    Procedure: INCISION AND DRAINAGE, LOWER EXTREMITY- FOOT & ANKLE;  Surgeon: Jimbo Montilla III, MD;  Location: [facility] OR;  Service: Orthopedics;  Laterality: Right;  CULTURES, VASCHE    INCISION AND DRAINAGE, LOWER EXTREMITY Right 5/21/2024    Procedure: INCISION AND DRAINAGE, LOWER EXTREMITY;  Surgeon: Jimbo Montilla III, MD;  Location: [facility] OR;  Service: Orthopedics;  Laterality: Right;  Pulsavac and Vashe    KNEE SURGERY      bilateral scope    WOUND DRESSING Right 4/9/2024    Procedure: WOUND VAC EXCHANGE;  Surgeon: Jimbo Montilla III, MD;  Location: [facility] OR;  Service: Orthopedics;  Laterality: Right;     Family History   Problem Relation Name Age of Onset    Diabetes Mother      Heart disease Mother      Hypertension Mother      Cataracts Mother      No Known Problems Father      Hypertension Sister      No Known Problems Brother      No Known Problems Maternal Aunt      No Known Problems Maternal Uncle      No Known Problems Paternal Aunt      No Known Problems Paternal Uncle      No Known Problems Maternal Grandmother      No Known Problems Maternal Grandfather      No Known Problems Paternal Grandmother      No Known Problems Paternal Grandfather      No Known Problems Daughter      No Known Problems Other      Amblyopia Neg Hx      Blindness Neg Hx      Cancer Neg Hx       Glaucoma Neg Hx      Macular degeneration Neg Hx      Retinal detachment Neg Hx      Strabismus Neg Hx      Stroke Neg Hx      Thyroid disease Neg Hx       Social History     Tobacco Use    Smoking status: Never    Smokeless tobacco: Never   Substance Use Topics    Alcohol use: No    Drug use: No       Review of patient's allergies indicates:   Allergen Reactions    Ace inhibitors      Hyperkalemia 8/2018  Other reaction(s): Unknown    Penicillins Hives     Tolerated cefepime and cefdinir previously    Tizanidine      Felt hot      Review of Systems   Constitutional:  Positive for malaise/fatigue. Negative for chills and fever.   HENT:  Negative for congestion, hearing loss and nosebleeds.    Eyes:  Negative for blurred vision.   Respiratory:  Negative for cough, hemoptysis, sputum production, shortness of breath and wheezing.    Cardiovascular:  Negative for chest pain, palpitations, orthopnea, claudication and leg swelling.   Gastrointestinal:  Negative for blood in stool, constipation, diarrhea, heartburn, melena and vomiting.   Genitourinary:  Negative for dysuria and hematuria.   Musculoskeletal:  Negative for joint pain and myalgias.   Skin:  Negative for itching and rash.   Neurological:  Negative for dizziness, sensory change and speech change.   Endo/Heme/Allergies:  Does not bruise/bleed easily.   Psychiatric/Behavioral:  Negative for depression. The patient is not nervous/anxious.        OBJECTIVE:     Vital Signs (Most Recent)  Temp: 98.2 °F (36.8 °C) (08/31/24 0745)  Pulse: 68 (08/31/24 1102)  Resp: 18 (08/31/24 0745)  BP: (!) 157/70 (08/31/24 0745)  SpO2: 100 % (08/31/24 0745)    Vital Signs Range (Last 24H):  Temp:  [98.1 °F (36.7 °C)-98.7 °F (37.1 °C)]   Pulse:  [65-80]   Resp:  [18]   BP: (134-157)/(61-74)   SpO2:  [99 %-100 %]     Physical Exam  Vitals and nursing note reviewed.   Constitutional:       Appearance: Normal appearance.   HENT:      Head: Normocephalic and atraumatic.      Nose:  Nose normal.   Eyes:      Extraocular Movements: Extraocular movements intact.   Cardiovascular:      Rate and Rhythm: Normal rate and regular rhythm.      Heart sounds: Normal heart sounds.   Pulmonary:      Breath sounds: Normal breath sounds.   Abdominal:      General: Abdomen is flat.      Palpations: Abdomen is soft.   Musculoskeletal:      Cervical back: Normal range of motion and neck supple.   Neurological:      Mental Status: He is alert.          Laboratory:  CBC:   Recent Labs   Lab 08/31/24  0708   WBC 4.86   RBC 2.89*   HGB 8.2*   HCT 25.3*      MCV 88   MCH 28.4   MCHC 32.4       Recent Labs   Lab 08/31/24  0708      CALCIUM 8.3*      K 4.6   CO2 22*      BUN 26*   CREATININE 5.2*       Diagnostic Results:  Labs: Reviewed  X-Ray: Reviewed      ASSESSMENT/PLAN:     Active Hospital Problems    Diagnosis  POA    *GI bleed [K92.2]  Yes     8/17/20 EGD - Normal esophagus. - Discolored, nodular and texture changed mucosa in the antrum. - Normal examined duodenum.  Path negative  8/21/24 EGD  Normal esophagus. - Gastric ulcer with oozing hemorrhage (Mauricio Class Ib). Clip was placed. Clip : MetaPack. hemostatic spray applied. - Clotted blood in the gastric fundus. - Normal examined duodenum. - No specimens collected.       Acute blood loss anemia (ABLA) [D62]  Yes    Gastric ulcer with hemorrhage [K25.4]  Yes    Wounds, multiple [T07.XXXA]  Yes     Chronic    Paroxysmal atrial fibrillation [I48.0]  Yes     Chronic     3/20/24 TTE LV normal systolic function LVEF 55-60%.  Converted to sinus rhythm on amiodarone   Amiodarone stopped on hospital discharge due to possible contributor to transaminitis      Chronic deep vein thrombosis (DVT) of popliteal vein of right lower extremity 9/21/20 outside US; unprovoked; anticoagulation [I82.531]  Yes    Anemia in chronic kidney disease [N18.9, D63.1]  Yes     Chronic    ESRD (end stage renal disease) [N18.6]  Yes      Chronic     2/27/20 renal US with dopplers no ALF.  Medical renal disease  8/2020 renal US: 1. Symmetric diffusely increased cortical echogenicity and elevated resistive indices, nonspecific indicators of chronic medical renal disease.  2. Prostatomegaly.  Some of the provided images are suggestive of a distinct hyperechoic component of the prostate gland, of uncertain etiology or significance, and potentially artifactual.  Dedicated prostate ultrasound or MRI may be of benefit for further characterization.    Started on HD while hospitalized for progression to ESRD 8/2020  -Continue dialysis per nephrology  -Outpatient HD has been arranged  -Had planned discharge 8/27 if remained afebrile and cultures negative.  Unfortunately his blood culture grew Candida parapsilosis  -Dr. Gomez with ID consulted and case discussed with him.  Started micafungin 8/27 and now on fluconazole.  -Dialysis line removed after HD 8/28 and plan line holiday over weekend.  -new line by IR on 8/31, tolerated dialysis fluid.  -continue outpatient dialysis.      Benign essential HTN [I10]  Yes     Chronic     01/06/2021 TTE mild left atrial enlargement.  LV normal size and systolic function LVEF 55%.  Indeterminate diastolic function.  Mild right atrial enlargement.  Normal RV systolic function.  Normal RV size.  PA pressure 20.  Normal CVP.  Three.      Dyslipidemia [E78.5]  Yes     Chronic    BPH (benign prostatic hyperplasia) 2/18/21 prostate bx normal [N40.0]  Yes     Chronic     6/26/2017 renal US mod prostatomegaly.      Hemiplegia and hemiparesis following cerebral infarction affecting right dominant side [I69.351]  Not Applicable     Chronic      Resolved Hospital Problems   No resolved problems to display.       ESRD on HD (MWF)  -- HD due Monday  -- Keep on Schedule   -- renally dose all meds  -- avoid nephrotoxic meds including NSAIDs       HTN    -- Continue antihypertensive meds  -- Low sodium diet     Anemia of chronic kidney  disease    Epogen with each HD    MBD  - on phos binders  Lab Results   Component Value Date    CALCIUM 8.3 (L) 08/31/2024    PHOS 2.6 (L) 08/21/2024        Nutrition/Hypoalbuminemia (E88.09)   Lab Results   Component Value Date    LABPROT 12.2 08/21/2024    ALBUMIN 2.3 (L) 08/21/2024   - Nepro with meals TID. Renal vitamins daily    Discharge today      Thank you for the consult, will follow  With any question please call   BERTO Garcia    Nephrology  Dr. Flynn

## 2024-08-31 NOTE — NURSING
[facility], [facility]  Nurses Note -- 4 Eyes      8/30/2024       Skin assessed on: Q Shift      [] No Pressure Injuries Present    [x]Prevention Measures Documented    [x] Yes LDA  for Pressure Injury Previously documented     [] Yes New Pressure Injury Discovered   [] LDA for New Pressure Injury Added      Attending RN:  Gilma Nicole, RN     Second RN:  Danielle Ryan LPN    PER handoff given Danielle FERNANDEZ      Pt resting in bed quietly. Respirations even and unlabored. NAD noted. No c/o pain.     Fall and safety precautions maintained. Bed alarm activated and audible.. Bed locked in lowest position, with side rails up x2. Call bell and personal items within reach

## 2024-08-31 NOTE — DISCHARGE SUMMARY
[facility] Medicine  Discharge Summary      Patient Name: Miguel Moore  MRN: [id number]  ROSE: [id number]  Patient Class: IP- Inpatient  Admission Date: 8/21/2024  Hospital Length of Stay: 10 days  Discharge Date and Time: No discharge date for patient encounter.  Attending Physician: Bonifacio Reyes MD   Discharging Provider: Bonifacio Reyes MD  Primary Care Provider: Raciel Raymond MD    Primary Care Team: Networked reference to record PCT     HPI:   68 y/o male with a PMHx of DVT, PAF on Eliquis, DMT2, HTN, seizures, stroke, presents to the ER for evaluation of coffee ground emesis and darks stool that started early this morning.  Patient reports 2 episodes of coffee ground emesis and 1 episode of dark stool.  Also reports associated abdominal pain which has now resolved.  No alleviating or aggravating factors.  Patient has not taken his Eliquis today.  He presented to ER where he was noted to be tachycardic.  Labs showing Hgb much lower than baseline.  He denies any fever or chills.  No other complaints.    Procedure(s) (LRB):  EGD (ESOPHAGOGASTRODUODENOSCOPY) (N/A)      Hospital Course:   68 y/o male with a PMHx of DVT, PAF on Eliquis, DMT2, HTN, ESRD, stroke admitted with GI bleed.  Hgb of 5.9, tachycardic and with active bleeding and admitted to ICU.  Started on Protonix drip.  GI consulted.  Nephrology consulted for HD.  Patient was taken for EGD that showed gastric ulcer with oozing hemorrhage, treated with a clip.  Patient was transfused 3 units of blood with good correction of H/H.  Eliquis held.  Will need to continue IV Protonix for 72 hours.until tomorrow.  Wound Care consulted for chronic LE wounds. H/H dropped and he was transfused pRBC. CTA abdomen/pelvis with no active hemorrhage but there is mucosal enhancement of gastric fundus. IR consulted and did angiogram that did not erveal active contrast extravasation but due to ongoing bleeding, elective coil embolization of left  gastric artery was done on 8/23/24. Hb stabilized  and he was cleared to be restarted on eliquis. Still with ongoing dark tarry stool but Hb stable. Started on carafate.  No further dark tarry stools and hemoglobin remained stable with no further bleeding.  Patient was discharged home in stable condition.       Goals of Care Treatment Preferences:  Code Status: Full Code       LaPOST: Yes              Consults:   Consults (From admission, onward)          Status Ordering Provider     Inpatient consult to Interventional Radiology  Once        Provider:  Eran Ravi MD    Completed ANNABEL COVARRUBIAS     Inpatient consult to Nephrology  Once        Provider:  Jacob Davenport MD    Completed FÁTIMA SWANSON     Inpatient consult to Gastroenterology  Once        Provider:  Tonie Chauhan MD    Completed ROSEMARY NEGRON            No new Assessment & Plan notes have been filed under this hospital service since the last note was generated.  Service: Hospital Medicine    Final Active Diagnoses:    Diagnosis Date Noted POA    PRINCIPAL PROBLEM:  GI bleed [K92.2] 08/21/2024 Yes    Acute blood loss anemia (ABLA) [D62] 08/23/2024 Yes    Gastric ulcer with hemorrhage [K25.4] 08/23/2024 Yes    Wounds, multiple [T07.XXXA] 08/22/2024 Yes     Chronic    Paroxysmal atrial fibrillation [I48.0] 03/20/2024 Yes     Chronic    Chronic deep vein thrombosis (DVT) of popliteal vein of right lower extremity 9/21/20 outside US; unprovoked; anticoagulation [I82.531] 09/21/2020 Yes    Anemia in chronic kidney disease [N18.9, D63.1] 08/26/2020 Yes     Chronic    ESRD (end stage renal disease) [N18.6] 03/22/2017 Yes     Chronic    Benign essential HTN [I10] 03/21/2017 Yes     Chronic    Dyslipidemia [E78.5] 03/21/2017 Yes     Chronic    BPH (benign prostatic hyperplasia) 2/18/21 prostate bx normal [N40.0] 03/21/2017 Yes     Chronic    Hemiplegia and hemiparesis following cerebral infarction affecting right dominant side  [I69.351] 03/21/2017 Not Applicable     Chronic      Problems Resolved During this Admission:       Discharged Condition: stable    Disposition: Home or Self Care    Follow Up:   Follow-up Information       [place] - Follow up.    Specialty: Home Health Services  Why: Office will contact pt.  Contact information:  [address]  [place] [address]  [phone]               Raciel Raymond MD Follow up.    Specialty: Internal Medicine  Why: Someone from the office will call patient to schedule follow-up appointment  Contact information:  [address]  [place] [address]  [phone]                           Patient Instructions:      Ambulatory referral/consult to Outpatient Case Management   Referral Priority: Routine Referral Type: Consultation   Referral Reason: Specialty Services Required   Number of Visits Requested: 1     Diet renal     Notify your health care provider if you experience any of the following:  temperature >100.4     Notify your health care provider if you experience any of the following:  persistent nausea and vomiting or diarrhea     Notify your health care provider if you experience any of the following:  severe uncontrolled pain     Notify your health care provider if you experience any of the following:  redness, tenderness, or signs of infection (pain, swelling, redness, odor or green/yellow discharge around incision site)     Notify your health care provider if you experience any of the following:  difficulty breathing or increased cough     Notify your health care provider if you experience any of the following:  severe persistent headache     Notify your health care provider if you experience any of the following:  worsening rash     Notify your health care provider if you experience any of the following:  persistent dizziness, light-headedness, or visual disturbances     Notify your health care provider if you experience any of the following:  increased  confusion or weakness     Activity as tolerated       Significant Diagnostic Studies: Labs: All labs within the past 24 hours have been reviewed    Pending Diagnostic Studies:       None           Medications:  Reconciled Home Medications:      Medication List        START taking these medications      pantoprazole 40 MG tablet  Commonly known as: PROTONIX  Take 1 tablet (40 mg total) by mouth 2 (two) times daily.     sucralfate 100 mg/mL suspension  Commonly known as: CARAFATE  Take 10 mLs (1 g total) by mouth 4 (four) times daily before meals and nightly. for 10 days            CONTINUE taking these medications      apixaban 5 mg Tab  Commonly known as: ELIQUIS  Take 1 tablet (5 mg total) by mouth 2 (two) times daily.     atorvastatin 80 MG tablet  Commonly known as: LIPITOR  Take 1 tablet (80 mg total) by mouth once daily.     ferrous gluconate 324 MG tablet  Commonly known as: FERGON  Take 1 tablet (324 mg total) by mouth daily with breakfast.     finasteride 5 mg tablet  Commonly known as: PROSCAR  Take 1 tablet (5 mg total) by mouth once daily.     NUZYRA 150 mg Tab  Generic drug: omadacycline  Take by mouth.     oxyCODONE-acetaminophen  mg per tablet  Commonly known as: PERCOCET  Take 1 tablet by mouth every 24 hours as needed for Pain. Medically necessary more than 7 days     RENVELA 800 mg Tab  Generic drug: sevelamer carbonate  Take 1 tablet (800 mg total) by mouth 3 (three) times daily with meals.     tamsulosin 0.4 mg Cap  Commonly known as: FLOMAX  Take 2 capsules (0.8 mg total) by mouth once daily.     TRIPHROCAPS 1 mg Cap  Generic drug: vitamin renal formula (B-complex-vitamin c-folic acid)  Take 1 capsule by mouth once daily.              Indwelling Lines/Drains at time of discharge:   Lines/Drains/Airways       Drain  Duration                  Hemodialysis AV Fistula Left upper arm -- days                    Time spent on the discharge of patient: >35 minutes         Bonifacio Reyes,  MD  Department of Hospital Medicine  [facility] - Telemetry

## 2024-08-31 NOTE — NURSING
Report received from enrrique Dillard RN. Patient resting quietly, no apparent distress noted at this time. Wheels locked in lowest position, call light within reach, Telemetry monitoring in place.    [facility] [place]  Nurses Note -- 4 Eyes      8/31/2024       Skin assessed on: Q Shift      [] No Pressure Injuries Present    [x]Prevention Measures Documented    [x] Yes LDA  for Pressure Injury Previously documented     [] Yes New Pressure Injury Discovered   [] LDA for New Pressure Injury Added      Attending RN:  Danielle Ryan LPN     Second RN:  GilmaRN

## 2024-08-31 NOTE — PLAN OF CARE
08/31/24 0900   Medicare Message   Important Message from Medicare regarding Discharge Appeal Rights Given to patient/caregiver;Explained to patient/caregiver;Signed/date by patient/caregiver   Date IMM was signed 08/31/24   Time IMM was signed 0900

## 2024-08-31 NOTE — PLAN OF CARE
[facility] - Telemetry      HOME HEALTH ORDERS  FACE TO FACE ENCOUNTER    Patient Name: Miguel Moore  YOB: 1955    PCP: Raciel Raymond MD   PCP Address: [address] / [place]  PCP Phone Number: [phone]  PCP Fax: [phone]    Encounter Date: 8/21/24    Admit to Home Health    Diagnoses:  Active Hospital Problems    Diagnosis  POA    *GI bleed [K92.2]  Yes     8/17/20 EGD - Normal esophagus. - Discolored, nodular and texture changed mucosa in the antrum. - Normal examined duodenum.  Path negative  8/21/24 EGD  Normal esophagus. - Gastric ulcer with oozing hemorrhage (Mauricio Class Ib). Clip was placed. Clip : [employer]. hemostatic spray applied. - Clotted blood in the gastric fundus. - Normal examined duodenum. - No specimens collected.       Acute blood loss anemia (ABLA) [D62]  Yes    Gastric ulcer with hemorrhage [K25.4]  Yes    Wounds, multiple [T07.XXXA]  Yes     Chronic    Paroxysmal atrial fibrillation [I48.0]  Yes     Chronic     3/20/24 TTE LV normal systolic function LVEF 55-60%.  Converted to sinus rhythm on amiodarone   Amiodarone stopped on hospital discharge due to possible contributor to transaminitis      Chronic deep vein thrombosis (DVT) of popliteal vein of right lower extremity 9/21/20 outside US; unprovoked; anticoagulation [I82.531]  Yes    Anemia in chronic kidney disease [N18.9, D63.1]  Yes     Chronic    ESRD (end stage renal disease) [N18.6]  Yes     Chronic     2/27/20 renal US with dopplers no ALF.  Medical renal disease  8/2020 renal US: 1. Symmetric diffusely increased cortical echogenicity and elevated resistive indices, nonspecific indicators of chronic medical renal disease.  2. Prostatomegaly.  Some of the provided images are suggestive of a distinct hyperechoic component of the prostate gland, of uncertain etiology or significance, and potentially artifactual.  Dedicated prostate ultrasound or MRI may be of benefit for  further characterization.    Started on HD while hospitalized for progression to ESRD 8/2020  -Continue dialysis per nephrology  -Outpatient HD has been arranged  -Had planned discharge 8/27 if remained afebrile and cultures negative.  Unfortunately his blood culture grew Candida parapsilosis  -Dr. Gomez with ID consulted and case discussed with him.  Started micafungin 8/27 and now on fluconazole.  -Dialysis line removed after HD 8/28 and plan line holiday over weekend.  -new line by IR on 8/31, tolerated dialysis fluid.  -continue outpatient dialysis.      Benign essential HTN [I10]  Yes     Chronic     01/06/2021 TTE mild left atrial enlargement.  LV normal size and systolic function LVEF 55%.  Indeterminate diastolic function.  Mild right atrial enlargement.  Normal RV systolic function.  Normal RV size.  PA pressure 20.  Normal CVP.  Three.      Dyslipidemia [E78.5]  Yes     Chronic    BPH (benign prostatic hyperplasia) 2/18/21 prostate bx normal [N40.0]  Yes     Chronic     6/26/2017 renal US mod prostatomegaly.      Hemiplegia and hemiparesis following cerebral infarction affecting right dominant side [I69.351]  Not Applicable     Chronic      Resolved Hospital Problems   No resolved problems to display.       Follow Up Appointments:  Future Appointments   Date Time Provider Department Center   9/5/2024  9:30 AM Lee Styles MD [facility]       Allergies:  Review of patient's allergies indicates:   Allergen Reactions    Ace inhibitors      Hyperkalemia 8/2018  Other reaction(s): Unknown    Penicillins Hives     Tolerated cefepime and cefdinir previously    Tizanidine      Felt hot       Medications: Review discharge medications with patient and family and provide education.      I have seen and examined this patient within the last 30 days. My clinical findings that support the need for the home health skilled services and home bound status are the following:no   Weakness/numbness  causing balance and gait disturbance due to Anemia making it taxing to leave home.     Diet:   renal diet    Labs:  N/a    Referrals/ Consults  Physical Therapy to evaluate and treat. Evaluate for home safety and equipment needs; Establish/upgrade home exercise program. Perform / instruct on therapeutic exercises, gait training, transfer training, and Range of Motion.  Occupational Therapy to evaluate and treat. Evaluate home environment for safety and equipment needs. Perform/Instruct on transfers, ADL training, ROM, and therapeutic exercises.   to evaluate for community resources/long-range planning.  Aide to provide assistance with personal care, ADLs, and vital signs.    Activities:   activity as tolerated    Nursing:   Agency to admit patient within 24 hours of hospital discharge unless specified on physician order or at patient request    SN to complete comprehensive assessment including routine vital signs. Instruct on disease process and s/s of complications to report to MD. Review/verify medication list sent home with the patient at time of discharge  and instruct patient/caregiver as needed. Frequency may be adjusted depending on start of care date.     Skilled nurse to perform up to 3 visits PRN for symptoms related to diagnosis    Notify MD if SBP > 160 or < 90; DBP > 90 or < 50; HR > 120 or < 50; Temp > 101; O2 < 88%; Other:       Ok to schedule additional visits based on staff availability and patient request on consecutive days within the home health episode.    Miscellaneous       Home Health Aide:  Nursing , Physical Therapy , Occupational Therapy , Medical Social Work , and Home Health Aide     Wound Care Orders  Local wound care to pressure injuries on bilateral feet every shift- Cleanse with Vashe. Apply Vashe moistened gauze to base of wounds and cover with dry gauze. Secure with Kerlix roll.     I certify that this patient is confined to his home and needs intermittent skilled  nursing care, physical therapy, and occupational therapy.

## 2024-08-31 NOTE — NURSING
[facility]. Patient awake, alert, oriented. No apparent distress noted. Belongings sent home with significant other.

## 2024-09-03 ENCOUNTER — PATIENT OUTREACH (OUTPATIENT)
Dept: ADMINISTRATIVE | Facility: CLINIC | Age: 69
End: 2024-09-03
Payer: MEDICARE

## 2024-09-04 ENCOUNTER — OUTPATIENT CASE MANAGEMENT (OUTPATIENT)
Dept: ADMINISTRATIVE | Facility: OTHER | Age: 69
End: 2024-09-04
Payer: MEDICARE

## 2024-09-04 ENCOUNTER — TELEPHONE (OUTPATIENT)
Dept: ENDOSCOPY | Facility: HOSPITAL | Age: 69
End: 2024-09-04
Payer: MEDICARE

## 2024-09-04 ENCOUNTER — TELEPHONE (OUTPATIENT)
Dept: UROLOGY | Facility: CLINIC | Age: 69
End: 2024-09-04
Payer: MEDICARE

## 2024-09-04 NOTE — TELEPHONE ENCOUNTER
Pt was contacted [facility]  ----- Message from Miguel Robertson sent at 9/4/2024 11:41 AM CDT -----  Regarding: Miguel Swanhedtom Appointment     Patient Name: Miguel Moore       Original Date Of Appt: 09/05      Preferred Date: Call to reschedule patient will not be able to make it       Contact Info: .[phone]        Additional Info:

## 2024-09-04 NOTE — TELEPHONE ENCOUNTER
"----- Message from Dustin Mauricio MA sent at 2024  8:16 AM CDT -----  Regarding: FW: Endo scheduling    ----- Message -----  From: Bren Ovalles NP  Sent: 2024  12:37 PM CDT  To: [facility] Endoscopist Clinic Patients  Subject: Endo scheduling                                  Procedure: EGD/Colonoscopy    Diagnosis: Screening colonoscopy and Peptic ulcer disease    Procedure Timin-12 weeks    #If within 4 weeks selected, please jasmin as high priority#    #If greater than 12 weeks, please select "4-12 weeks" and delay sending until 2 months prior to requested date#     Provider: Dr Chauhan or Any available endoscopist    Location: Hospital Based ([facility] [place] 2-Endo,  endo, [facility])    Additional Scheduling Information: Blood thinners    Prep Specifications:Standard prep    Have you attached a patient to this message: Yes  "

## 2024-09-04 NOTE — TELEPHONE ENCOUNTER
Contacted the patient to schedule an endoscopy procedure(s) egd/colonoscopy.. The patient did not answer the call and left a voice message requesting a call back.

## 2024-09-09 ENCOUNTER — TELEPHONE (OUTPATIENT)
Dept: UROLOGY | Facility: CLINIC | Age: 69
End: 2024-09-09
Payer: MEDICARE

## 2024-09-09 NOTE — TELEPHONE ENCOUNTER
Pt was contacted unable to lvm. Appt r/s  ----- Message from Saranya Pavon sent at 9/9/2024  9:25 AM CDT -----  Regarding: Patient call back  .Type: Patient Call Back    Who called:self     What is the request in detail:would like to reschedule procedure that is on 09/30/2024. Patient states he has to have appointments on Tuesday, or Thursday due to him going to dialysis on M, W, & F    Can the clinic reply by [facility]?no     Would the patient rather a call back or a response via [facility]? Call     Best call back number:.[phone]        Additional Information:

## 2024-09-10 ENCOUNTER — PATIENT OUTREACH (OUTPATIENT)
Dept: ADMINISTRATIVE | Facility: OTHER | Age: 69
End: 2024-09-10
Payer: MEDICARE

## 2024-09-10 ENCOUNTER — OUTPATIENT CASE MANAGEMENT (OUTPATIENT)
Dept: ADMINISTRATIVE | Facility: OTHER | Age: 69
End: 2024-09-10
Payer: MEDICARE

## 2024-09-10 NOTE — PROGRESS NOTES
This Community Health Worker (CHW) completed Social Determinant of Health (SDOH)  Questionnaire with patient/caregiver via telephone today.  Notified [facility] Maira DSOUZA, DARRION of completion.      Patient stated he would be interested in transportation needs.I Have informed mr. Moore of [employer] number to verify if he has transportation benefits. No additional assistance at this time.

## 2024-09-11 ENCOUNTER — PATIENT OUTREACH (OUTPATIENT)
Dept: ADMINISTRATIVE | Facility: OTHER | Age: 69
End: 2024-09-11
Payer: MEDICARE

## 2024-09-11 NOTE — PROGRESS NOTES
Community Health Worker continues to follow this patient for transportation  Location of meeting: telephone   Resources: Mr Moore stated he has not contacted [employer] regarding his transportation  benefit. I have informed him again of [employer] transportation number. [facility]. No additional needs at this time.  Follow up required: yes  Next scheduled follow up: n/a

## 2024-09-13 ENCOUNTER — PATIENT OUTREACH (OUTPATIENT)
Dept: ADMINISTRATIVE | Facility: OTHER | Age: 69
End: 2024-09-13
Payer: MEDICARE

## 2024-09-13 NOTE — PROGRESS NOTES
CHW - Case Closure    This Community Health Worker spoke to patient via telephone today.   Pt/Caregiver reported: Mr. Moore stated he has not called [employer] and he will contact them later. He informed that he uses [employer] today. I informed him that [employer] stated they do not have wheelchair accessible transportation and he will have to use [employer].  Pt/Caregiver denied any additional needs at this time and agrees with episode closure at this time.  Provided patient with Community Health Worker's contact information and encouraged him/her to contact this Community Health Worker if additional needs arise.

## 2024-09-17 NOTE — PROGRESS NOTES
Outpatient Care Management  Initial Patient Assessment    Patient: Miguel Moore  MRN: [id number]  Date of Service: 09/10/2024  Completed by: Maira Jacobs RN  Referral Date: 08/31/2024  Date of Eligibility: 8/31/2024  Program:   High Risk  Status: Ongoing  Effective Dates: 9/10/2024 - present  Responsible Staff: Maira Jacobs RN        Reason for Visit   Patient presents with    OPCM RN Second Assessment Attempt    OPCM Chart Review    OPCM Enrollment Call    Plan Of Care       Brief Summary:  Miguel Moore was referred by provider for Hematemesis, unspecified whether nausea present. Patient qualifies for program based on risk score of 66.5%.   Active problem list, medical, surgical and social history reviewed. Active comorbidities include Seizure disorder as sequela of cerebrovascular accident, CME, Pulmonary nodule, HTN, Paroxysmal atrial fib, Anemia, DM, GI bleed, Open wound to right foot, and ESRD. Areas of need identified by patient include HTN and GI bleed.       Disability Status  Is the patient alert and oriented (person, place, time, and situation)?: Alert and oriented x 4  Hearing Difficulty or Deaf: no  Visual Difficulty or Blind: no  Visual and Hearing Needs Conclusion: Pt reports no problems or changes at this time.  Difficulty Concentrating, Remembering or Making Decisions: no  Communication Difficulty: no  Eating/Swallowing Difficulty: no  Walking or Climbing Stairs Difficulty: yes  Walking or Climbing Stairs: ambulation difficulty, requires equipment; ambulation difficulty, assistance 1 person; stair climbing difficulty, requires equipment; stair climbing difficulty, assistance 1 person; transferring difficulty, assistance 1 person  Dressing/Bathing Difficulty: yes  Dressing/Bathing: bathing difficulty, assistance 1 person  Toileting : Assistance Required  Continence : Incontinence - Bowel  Difficulty Managing Errands Independently: yes  Equipment Currently Used at Home: cane,  straight; glucometer  ADL Conclusion Statement: Pt reports that he ambulates with a cane and his daughter helps with his baths.  Change in Functional Status Since Onset of Current Illness/Injury: no        Spiritual Beliefs  Spiritual, Cultural Beliefs, Shinto Practices, Values that Affect Care: no      Social History     Socioeconomic History    Marital status: Single   Occupational History    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, etc   Tobacco Use    Smoking status: Never    Smokeless tobacco: Never   Substance and Sexual Activity    Alcohol use: No    Drug use: No   Social History Narrative    Lives with daughter     Social Determinants of Health     Financial Resource Strain: Medium Risk (9/10/2024)    Overall Financial Resource Strain (CARDIA)     Difficulty of Paying Living Expenses: Somewhat hard   Food Insecurity: Food Insecurity Present (9/10/2024)    Hunger Vital Sign     Worried About Running Out of Food in the Last Year: Sometimes true     Ran Out of Food in the Last Year: Never true   Transportation Needs: Unmet Transportation Needs (9/10/2024)    PRAPARE - Transportation     Lack of Transportation (Medical): Yes     Lack of Transportation (Non-Medical): No   Physical Activity: Insufficiently Active (9/10/2024)    Exercise Vital Sign     Days of Exercise per Week: 4 days     Minutes of Exercise per Session: 10 min   Stress: No Stress Concern Present (9/10/2024)    [place] [place] [facility] [employer] [facility] - Occupational Stress Questionnaire     Feeling of Stress : Not at all   Housing Stability: Low Risk  (9/10/2024)    Housing Stability Vital Sign     Unable to Pay for Housing in the Last Year: No     Homeless in the Last Year: No       Roles and Relationships  Primary Source of Support/Comfort: significant other; child(hannah)  Name of Support/Comfort Primary Source: Maddy Valdes      Advance Directives (For Healthcare)  Advance Directive  (If Adv Dir status is received, view  document under Adv Dir in header or Chart Review Media tab): Patient does not have Advance Directive, declines information.  Patient Requests Assistance: Patient will do independently        Patient Reported Insurance  Verified current insurance plan:: [employer] Medicare Advantage  Humana benefits discussed:: Mail Order Pharmacy            9/10/2024     2:10 PM 7/30/2024     2:54 PM 8/1/2023     3:53 PM 1/6/2022    10:31 AM 10/22/2019     3:22 PM 10/22/2019     3:21 PM 1/15/2018     1:08 PM   Depression Patient Health Questionnaire   Over the last two weeks how often have you been bothered by little interest or pleasure in doing things Not at all Not at all Not at all Not at all Not at all Not at all Not at all   Over the last two weeks how often have you been bothered by feeling down, depressed or hopeless Not at all Not at all Not at all Not at all Not at all Not at all Not at all   PHQ-2 Total Score 0 0 0 0 0 0 0       Learning Assessment       08/30/2024 0358 [facility] (8/21/2024 - 8/31/2024)   Created by Ivonne Perez, RN - RN (Nurse) Status: Complete                 PRIMARY LEARNER     Primary Learner Name:  Miguel Moore PL - 08/30/2024 0358    Relationship:  Patient PL - 08/30/2024 0358    Does the primary learner have any barriers to learning?:  No Barriers PL - 08/30/2024 0358    What is the preferred language of the primary learner?:  English PL - 08/30/2024 0358    Is an  required?:  No PL - 08/30/2024 0358    How does the primary learner prefer to learn new concepts?:  Listening PL - 08/30/2024 0358    How often do you need to have someone help you read instructions, pamphlets, or written material from your doctor or pharmacy?:  Never PL - 08/30/2024 0358        CO-LEARNER #1     No question answered        CO-LEARNER #2     No question answered        SPECIAL TOPICS     No question answered        ANSWERED BY:     No question answered        Comments         Edit History        Ivonne Perez, RN - RN (Nurse)   08/30/2024 1254

## 2024-09-20 ENCOUNTER — OUTPATIENT CASE MANAGEMENT (OUTPATIENT)
Dept: ADMINISTRATIVE | Facility: OTHER | Age: 69
End: 2024-09-20
Payer: MEDICARE

## 2024-09-20 NOTE — PROGRESS NOTES
Outpatient Care Management  Plan of Care Follow Up Visit    Patient: Miguel Moore  MRN: [id number]  Date of Service: 09/20/2024  Completed by: Maira Jacobs RN  Referral Date: 08/31/2024    Reason for Visit   Patient presents with    Update Plan Of Care       Brief Summary: Pt reports that he would like to get assistance with purchasing his food. Pt voiced that he was getting the assistance card from [employer] but he had to change to a new insurance plan which does not include the assistance card. Referral place for LCSW with OPCM.

## 2024-09-24 ENCOUNTER — OUTPATIENT CASE MANAGEMENT (OUTPATIENT)
Dept: ADMINISTRATIVE | Facility: OTHER | Age: 69
End: 2024-09-24
Payer: MEDICARE

## 2024-09-25 NOTE — PROGRESS NOTES
Outpatient Care Management   - Patient Assessment    Patient: Miguel Moore  MRN:  [id number]  Date of Service:  9/25/2024  Completed by:  Deepti Trejo LCSW  Referral Date: 08/31/2024    Reason for Visit   Patient presents with    Social Work Assessment     09/25/2024       Brief Summary:  received a referral from OPCM RN for the following HR SW psychosocial needs: food assistance. The patient also requests assistance with ACP and OTC. Spoke with patient who reports he is no longer eligible for Medicaid and therefore his [employer] Medicare plan changed. Patient used to get $100 per month with his [employer] SNP, and now he does not receive that benefit. This has made it difficult for him to afford food. Patient states he lives with his daughter and granddaughter. Patient's daughter assists patient with ADLs such as bathing and dressing. Patient provides consent to speak with his daughter, Maddy, if needed in the future. Patient uses a W/C at this time. Patient states before his recent illness, he was ambulating with a cane. Patient states he is working with  PT twice a week and feels he is beginning to improve. Educated patient regarding his [employer] Meals benefits through [employer]. Patient requested for  to place an order for him. SW reviewed the dietary options with patient and placed an order. Patient is agreeable for MEGAN to mail him the order information and [employer] ph# to call should he have an issue with the order or would like to place another one. MEGAN educated patient regarding [employer] home delivered meals program and patient is agreeable for MEGAN to submit an online application for that service. MEGAN also discussed local food torres with patient who is agreeable for MEGAN to mail him the information. Patient states he uses [employer] for reliable transportation. Patient reports he does not have any ACP documents and is interested in receiving a packet to consider filling out. MEGAN  educated patient regarding the ACP packet. SW informed patient that he has OTC benefits; patient was aware of this benefit but unsure how to access it. SW offered to mail patient the OTC catalog, which also informs how patient can order online or call; patient is agreeable. Patient denies any additional questions or needs at this time. Patient is agreeable for SW to follow up in 1 week.    Mailed and educated regarding: [employer] order information/delivery date & ph#, [employer] contact information, ACP packet, OTC catalog, and local food resources/food torres.    Care plan was created in collaboration with patient/caregiver input.

## 2024-09-27 ENCOUNTER — EXTERNAL HOME HEALTH (OUTPATIENT)
Dept: HOME HEALTH SERVICES | Facility: HOSPITAL | Age: 69
End: 2024-09-27
Payer: MEDICARE

## 2024-09-27 ENCOUNTER — TELEPHONE (OUTPATIENT)
Dept: ENDOSCOPY | Facility: HOSPITAL | Age: 69
End: 2024-09-27
Payer: MEDICARE

## 2024-09-27 NOTE — TELEPHONE ENCOUNTER
Contact and spoke with patient. Per patient decline to schedule colonoscopy due to the prepping stating that it would be hard for him to make it to the restroom in time. Message sent to the ordering provider.

## 2024-09-27 NOTE — TELEPHONE ENCOUNTER
"----- Message from Dustin Mauricio MA sent at 2024  8:16 AM CDT -----  Regarding: FW: Endo scheduling    ----- Message -----  From: Bren Ovalles NP  Sent: 2024  12:37 PM CDT  To: [facility] Endoscopist Clinic Patients  Subject: Endo scheduling                                  Procedure: EGD/Colonoscopy    Diagnosis: Screening colonoscopy and Peptic ulcer disease    Procedure Timin-12 weeks    #If within 4 weeks selected, please jasmin as high priority#    #If greater than 12 weeks, please select "4-12 weeks" and delay sending until 2 months prior to requested date#     Provider: Dr Chauhan or Any available endoscopist    Location: Hospital Based ([facility] [place] [facility]-Endo,  endo, [facility])    Additional Scheduling Information: Blood thinners    Prep Specifications:Standard prep    Have you attached a patient to this message: Yes  "

## 2024-09-27 NOTE — TELEPHONE ENCOUNTER
"----- Message from Dustin Mauricio MA sent at 2024  8:16 AM CDT -----  Regarding: FW: Endo scheduling    ----- Message -----  From: Bren Ovalles NP  Sent: 2024  12:37 PM CDT  To: [facility] Endoscopist Clinic Patients  Subject: Endo scheduling                                  Procedure: EGD/Colonoscopy    Diagnosis: Screening colonoscopy and Peptic ulcer disease    Procedure Timin-12 weeks    #If within 4 weeks selected, please jasmin as high priority#    #If greater than 12 weeks, please select "4-12 weeks" and delay sending until 2 months prior to requested date#     Provider: Dr Chauhan or Any available endoscopist    [place] [place] [facility] [facility]    Additional Scheduling Information: Blood thinners    Prep Specifications:Standard prep    Have you attached a patient to this message: Yes  "

## 2024-09-30 ENCOUNTER — TELEPHONE (OUTPATIENT)
Dept: UROLOGY | Facility: CLINIC | Age: 69
End: 2024-09-30
Payer: MEDICARE

## 2024-09-30 NOTE — TELEPHONE ENCOUNTER
Pt was contacted appt r/s due to pt not having any transportation.   ----- Message from [employer] sent at 9/30/2024  8:14 AM CDT -----  Regarding: Self  Type: Patient Call Back     What is the request in detail:Pt wants to rs procedure     Can the clinic reply by [facility]? No     Would the patient rather a call back or a response via [facility]? Call back    Best call back number: .[phone]      Additional Information:    Thank you.

## 2024-10-01 ENCOUNTER — TELEPHONE (OUTPATIENT)
Dept: ENDOSCOPY | Facility: HOSPITAL | Age: 69
End: 2024-10-01
Payer: MEDICARE

## 2024-10-01 ENCOUNTER — OUTPATIENT CASE MANAGEMENT (OUTPATIENT)
Dept: ADMINISTRATIVE | Facility: OTHER | Age: 69
End: 2024-10-01
Payer: MEDICARE

## 2024-10-01 DIAGNOSIS — K27.9 PEPTIC ULCER DISEASE: Primary | ICD-10-CM

## 2024-10-01 NOTE — PROGRESS NOTES
"Outpatient Care Management   - Care Plan Follow Up    Patient: Miguel Moore  MRN:  [id number]  Date of Service:  10/1/2024  Completed by:  Deepti Trejo LCSW  Referral Date: 08/31/2024    Reason for Visit   Patient presents with    OPCM SW Follow Up Call     10/01/2024       Brief Summary: Spoke with patient who reports he is received a call yesterday about scheduling a "scope appointment" but is unsure who to call. Per chart, it appears patient has been contacted by endo and urology. Patient states he spoke with urology and requests to speak with endo. SW secure chat messaged Rosendo Aguila MA, and informed of patient's request. MA will call patient. Patient states that his [employer] meals never arrived. SW contacted [employer] who states it was marked as delivered on their end, but they will work on resending the missed box. Patient is scheduled to receive his next meal order Friday 10/4. SW informed patient of all above, patient verbalized understanding and did confirm the correct address on file (also on [place]). Patient reports he did receive the mailed resources but has not opened the mail yet, but plans to review the resources in the upcoming weeks. SW reviewed the content of the mailed resources with patient who verbalized understanding. Patient is agreeable for a follow up call in 2 weeks.    Complex Care Plan     Care plan was discussed and completed today with input from patient and/or caregiver.    Patient Instructions     No follow-ups on file.  "

## 2024-10-01 NOTE — TELEPHONE ENCOUNTER
Spoke to patient to schedule procedure(s) Upper Endoscopy (EGD)       Physician to perform procedure(s) Dr. DENA Chauhan  Date of Procedure (s) 11/14/2024  Arrival Time 10:00 Am   Time of Procedure(s) 11:30 AM    Location of Procedure(s) [address]   Type of Rx Prep sent to patient: N/A  Instructions provided to patient via Postal Mail    Patient was informed on the following information and verbalized understanding. Screening questionnaire reviewed with patient and complete. If procedure requires anesthesia, a responsible adult needs to be present to accompany the patient home, patient cannot drive after receiving anesthesia. Appointment details are tentative, especially check-in time. Patient will receive a prep-op call 7 days prior to confirm check-in time for procedure. If applicable the patient should contact their pharmacy to verify Rx for procedure prep is ready for pick-up. Patient was advised to call the scheduling department at [phone] if pharmacy states no Rx is available. Patient was advised to call the endoscopy scheduling department if any questions or concerns arise.      [facility] Scheduling Department

## 2024-10-01 NOTE — TELEPHONE ENCOUNTER
"----- Message from Bren Ovalles NP sent at 2024 10:11 AM CDT -----  Regarding: RE: Endo scheduling  HI,   Ok seems like he declines colonoscopy, ok to keep him for EGD outpatient. Please cancel colonoscopy.  ----- Message -----  From: Rosendo Aguila MA  Sent: 2024   9:56 AM CDT  To: Bren Ovalles NP  Subject: FW: Endo scheduling                              Hi Bren,     Contact and spoke with patient to schedule both procedures. Patient was not in favor of colonoscopy due to he will not make it to the restroom fast enough. I offered some suggestion of maybe family member  can help or if he can have a beside commode patient stated it will be rough. Patient was okay with just the egd procedure. He stated that he can do EGD, but it would be hard for him to drink the liquid and going to the restroom. Please advise.  ----- Message -----  From: Dustin Mauricio MA  Sent: 2024   8:16 AM CDT  To: Rosendo Aguila MA  Subject: FW: Endo scheduling                                ----- Message -----  From: Bren Ovalles NP  Sent: 2024  12:37 PM CDT  To: [facility]  Subject: Endo scheduling                                  Procedure: EGD/Colonoscopy    Diagnosis: Screening colonoscopy and Peptic ulcer disease    Procedure Timin-12 weeks    #If within 4 weeks selected, please jasmin as high priority#    #If greater than 12 weeks, please select "4-12 weeks" and delay sending until 2 months prior to requested date#     Provider: Dr Chauhan or Any available endoscopist    Location: Hospital Based ([facility]Endo,  endo, [facility])    Additional Scheduling Information: Blood thinners    Prep Specifications:Standard prep    Have you attached a patient to this message: Yes  "

## 2024-10-11 ENCOUNTER — TELEPHONE (OUTPATIENT)
Dept: FAMILY MEDICINE | Facility: CLINIC | Age: 69
End: 2024-10-11
Payer: MEDICARE

## 2024-10-11 DIAGNOSIS — S91.301D OPEN WOUND OF RIGHT FOOT, SUBSEQUENT ENCOUNTER: Primary | ICD-10-CM

## 2024-10-11 DIAGNOSIS — R53.1 RIGHT SIDED WEAKNESS: ICD-10-CM

## 2024-10-11 NOTE — TELEPHONE ENCOUNTER
----- Message from Vidya sent at 10/11/2024  9:19 AM CDT -----  Type: Patient Call Back    Who called: self     What is the request in detail: patient is requesting orders for a new wheelchair. Please call    Can the clinic reply by [facility]? No     Would the patient rather a call back or a response via [facility]?  call    Best call back number: .[phone] (home)      Additional Information:

## 2024-10-11 NOTE — TELEPHONE ENCOUNTER
----- Message from Vidya sent at 10/11/2024  9:19 AM CDT -----  Type: Patient Call Back    Who called: self     What is the request in detail: patient is requesting orders for a new wheelchair. Please call    Can the clinic reply by [facility]? No     Would the patient rather a call back or a response via [facility]?  call    Best call back number: .[phone] (home)      Additional Information:

## 2024-10-14 ENCOUNTER — PATIENT OUTREACH (OUTPATIENT)
Dept: EMERGENCY MEDICINE | Facility: HOSPITAL | Age: 69
End: 2024-10-14
Payer: MEDICARE

## 2024-10-14 NOTE — PROGRESS NOTES
Phoned patient for follow-up. Patient declined the need for additional assistance at this time.  Lizy Lange

## 2024-10-14 NOTE — PROGRESS NOTES
Phoned patient to remind of upcoming appointment on 10/15/24 at 9:00 with Dr Raymond [facility] [address] [place] [address] [phone]. Patient requested to reschedule. In Basket message sent to PCP staff for assistance.  Lizy Lange

## 2024-10-15 ENCOUNTER — OUTPATIENT CASE MANAGEMENT (OUTPATIENT)
Dept: ADMINISTRATIVE | Facility: OTHER | Age: 69
End: 2024-10-15
Payer: MEDICARE

## 2024-10-15 NOTE — PROGRESS NOTES
10/22/2024  2nd attempt to complete Follow-Up for Outpatient Care Management, left message. Collaborated with OPCM RN.    10/15/2024  1st attempt to complete Follow-Up for Outpatient Care Management, left message. Attempt letter sent.

## 2024-10-15 NOTE — LETTER
Miguel Moore  [facility] [place] [address]      Dear Miguel Moore,     I am writing from the Outpatient Care Management Department at [facility]. I have been unsuccessful at reaching you since we spoke on Tuesday 10/1/2024.  I hope you found the assistance previously provided to you helpful.     Please contact me at [phone] from 8:00AM to 4:30 PM on Monday thru Friday.     As you know, [facility] also has a program with a nurse available 24/7 to answer questions or provide medical advice.  [facility] can be reached at [phone].    Sincerely,       Deepti Trejo LCSW  Outpatient Care Management

## 2024-10-21 ENCOUNTER — DOCUMENT SCAN (OUTPATIENT)
Dept: HOME HEALTH SERVICES | Facility: HOSPITAL | Age: 69
End: 2024-10-21

## 2024-10-21 ENCOUNTER — DOCUMENT SCAN (OUTPATIENT)
Dept: HOME HEALTH SERVICES | Facility: HOSPITAL | Age: 69
End: 2024-10-21
Payer: MEDICARE

## 2024-10-21 PROBLEM — S91.301A OPEN WOUND OF RIGHT FOOT: Status: RESOLVED | Noted: 2024-05-10 | Resolved: 2024-10-21

## 2024-10-21 PROBLEM — T14.8XXA OPEN WOUND: Status: RESOLVED | Noted: 2024-03-26 | Resolved: 2024-10-21

## 2024-10-21 PROBLEM — M86.271 SUBACUTE OSTEOMYELITIS OF RIGHT FOOT: Status: RESOLVED | Noted: 2024-05-13 | Resolved: 2024-10-21

## 2024-10-21 PROBLEM — I70.239 ATHEROSCLEROSIS OF NATIVE ARTERY OF RIGHT LOWER EXTREMITY WITH ULCERATION: Status: RESOLVED | Noted: 2024-05-13 | Resolved: 2024-10-21

## 2024-10-21 PROBLEM — K25.4 GASTRIC ULCER WITH HEMORRHAGE: Status: RESOLVED | Noted: 2024-08-23 | Resolved: 2024-10-21

## 2024-10-24 ENCOUNTER — TELEPHONE (OUTPATIENT)
Dept: ENDOSCOPY | Facility: HOSPITAL | Age: 69
End: 2024-10-24
Payer: MEDICARE

## 2024-10-24 DIAGNOSIS — Z99.2 DIALYSIS PATIENT: Primary | ICD-10-CM

## 2024-10-24 DIAGNOSIS — Z01.812 PRE-OPERATIVE LABORATORY EXAMINATION: ICD-10-CM

## 2024-10-24 NOTE — PROGRESS NOTES
Outpatient Care Management   - Care Plan Follow Up    Patient: Miguel Moore  MRN:  [id number]  Date of Service:  10/24/2024  Completed by:  Deepti Trejo LCSW  Referral Date: 08/31/2024    Reason for Visit   Patient presents with    Other     10/22/2024  2nd attempt to complete Follow-Up for Outpatient Care Management, left message. Collaborated with OPCM RN.    10/15/2024  1st attempt to complete Follow-Up for Outpatient Care Management, left message. Attempt letter sent.    \[facility] [place] [facility]\"" SW Follow Up Call     10/24/2024       Brief Summary: Received return call from patient who reports he was informed by his home health nurse that his current insurance information is outdated. Patient reports he had spoken with "[employer] 2-3 weeks ago and had his plan renewed and/or updated. Patient states he has not received a new card in the mail. SW attempted to look up his "[employer] plan, however, patient's H# is invalid. SW encouraged patient to call either Gary at "[employer] or the "[employer] Customer Service line again to request they resend his card information. SW advised patient to obtain his ID# if possible. Patient confirms he received his "[employer] meals. Patient is agreeable for a follow up call in 1 week.    Complex Care Plan     Care plan was discussed and completed today with input from patient and/or caregiver.    Patient Instructions     No follow-ups on file.   Ambulatory

## 2024-10-24 NOTE — TELEPHONE ENCOUNTER
EGD Procedure Prep Instructions      Date of procedure: 11/14/2024 Arrive at: 9:00 AM to 1st Fl Laboratory then to 2nd  Endoscopy Procedure Area       Location of Department: [address] [place], [place] [address] [address]  Take the Elevators to 2nd Floor Endoscopy Procedural Area    How to prep:    Day Before Procedure: 11/13/2024     You may have a light evening meal.   No solid food after 7:00 pm.   Continue drinking clear liquids.       Day of the Procedure:  11/14/2024     You may have water/clear liquids until 2 hours before your procedure or as directed by the scheduling nurse  9:30 Am. See below for list.    What You CANNOT do:   Do not drink milk or anything colored red.  Do not drink alcohol.  No gum chewing or candy morning of procedure.    Liquids That Are OK to Drink:   Water  Sports drinks (Gatorade, Power-Aid)  Coffee or tea (no cream or nondairy creamer)  Clear juices without pulp (apple, white grape)  Gelatin desserts (no fruit or toppings)  Clear soda (sprite, coke, ginger ale)  Chicken broth (until 12 midnight the night before procedure)      Comments: [facility]  Laboratory  11/14/2024 for 9:00 AM           IMPORTANT INFORMATION TO KNOW BEFORE YOUR PROCEDURE    [facility] 2nd Floor       When you arrive:  If your procedure is Monday - Friday 5am - 7pm - Please enter through the front door near [facility]. Please proceed up the first set of elevators to the 2nd floor where you will check in at the endo registration desk.     If your procedure is on a Saturday (weekend), enter through the back Outpatient entrance. Please note this entrance is diagonal from the Emergency Department entrance.              If your procedure requires the administration of anesthesia, it is necessary for a responsible adult to drive you home. (Medical Transportation, Uber, Lyft, Taxi, etc. may ONLY be used if a responsible adult is present to accompany you home.  The responsible  adult CAN'T be the  of the service).      person must be available to return to pick you up within 15 minutes of being notified of discharge.       Please bring a picture ID, insurance card, & copayment      Take Medications as directed below:    I    1) Stop taking Eliquis (apixaban) 2 days (prior to the procedure) on:  11/22/2024      If you begin taking any blood thinning medications, injectable weight loss/diabetes medications (other than insulin) , or Adipex (Phentermine) please contact the endoscopy scheduling department listed below as soon as possible.    If you are diabetic see the attached instruction sheet regarding your medication.     If you take HEART, BLOOD PRESSURE, SEIZURE, PAIN, LUNG (including inhalers/nebulizers), ANTI-REJECTION (transplant patients), or PSYCHIATRIC medications, please take at your regular times with a sip of water or as directed by the scheduling nurse.     Important contact information:    Endoscopy [phone] Hours of operation Monday-Friday 8:00-4:30pm.    Questions about insurance or financial obligations call [phone] or [phone].    If you have questions regarding the prep or need to reschedule, please call [phone]. After hours questions requiring immediate assistance, contact [facility] nurse line at [phone] or [phone].   NOTE:     On occasion, unforeseen circumstances may cause a delay in your procedure start time. We respect your time and appreciate your patience during these circumstances.      Comments:

## 2024-10-25 ENCOUNTER — TELEPHONE (OUTPATIENT)
Dept: VASCULAR SURGERY | Facility: CLINIC | Age: 69
End: 2024-10-25
Payer: MEDICARE

## 2024-10-25 DIAGNOSIS — N18.6 ESRD (END STAGE RENAL DISEASE): Primary | ICD-10-CM

## 2024-10-25 NOTE — TELEPHONE ENCOUNTER
Called and spoke with pt. and states has some issues with bleeding after dialysis for short period and want to get checked out with vascular. I called and spoke with Dialysis nurse [facility] in [place] and confirmed pt. is having some bleeding after dialysis. He has not had fistula checked in a while. He was seen in June s/p angio and has wounds that he said are healing and did not follow up. I ordered an us of his access and  awaiting to here from ultrasound when they can fit him in on Tuesday and will add him on schedule. I told pt. And dialysis nurse if has has any issues prior with bleeding swelling to go to the ER. Please advise further.Thanks!

## 2024-10-28 ENCOUNTER — TELEPHONE (OUTPATIENT)
Dept: UROLOGY | Facility: CLINIC | Age: 69
End: 2024-10-28
Payer: MEDICARE

## 2024-10-28 ENCOUNTER — TELEPHONE (OUTPATIENT)
Dept: VASCULAR SURGERY | Facility: CLINIC | Age: 69
End: 2024-10-28
Payer: MEDICARE

## 2024-10-29 ENCOUNTER — TELEPHONE (OUTPATIENT)
Dept: ENDOSCOPY | Facility: HOSPITAL | Age: 69
End: 2024-10-29
Payer: MEDICARE

## 2024-10-30 ENCOUNTER — OUTPATIENT CASE MANAGEMENT (OUTPATIENT)
Dept: ADMINISTRATIVE | Facility: OTHER | Age: 69
End: 2024-10-30
Payer: MEDICARE

## 2024-10-31 ENCOUNTER — OUTPATIENT CASE MANAGEMENT (OUTPATIENT)
Dept: ADMINISTRATIVE | Facility: OTHER | Age: 69
End: 2024-10-31
Payer: MEDICARE

## 2024-11-05 ENCOUNTER — DOCUMENT SCAN (OUTPATIENT)
Dept: HOME HEALTH SERVICES | Facility: HOSPITAL | Age: 69
End: 2024-11-05
Payer: MEDICARE

## 2024-11-05 ENCOUNTER — OUTPATIENT CASE MANAGEMENT (OUTPATIENT)
Dept: ADMINISTRATIVE | Facility: OTHER | Age: 69
End: 2024-11-05
Payer: MEDICARE

## 2024-11-05 NOTE — PROGRESS NOTES
Outpatient Care Management   - Care Plan Follow Up    Patient: Miguel Moore  MRN:  [id number]  Date of Service:  11/5/2024  Completed by:  Deepti Trejo LCSW  Referral Date: 08/31/2024    Reason for Visit   Patient presents with    OP[facility] SW Follow Up Call     11/05/2024       Brief Summary: Received call from patient requesting assistance in cancelling his [employer] meals. Patient reports he has so many meals that he no longer has room in his refrigerator. SW cancelled patients order as requested. SW informed patient that per his PCP, he will need to be seen in person at his upcoming appt on Thursday 11/7/24 prior to a W/C order being placed. Patient verbalized understanding and denies further questions. Patient is agreeable for call from [facility] in 3 weeks to follow up on W/C order.    Complex Care Plan     Care plan was discussed and completed today with input from patient and/or caregiver.    Patient Instructions     No follow-ups on file.

## 2024-11-06 NOTE — PROGRESS NOTES
This note was created by combination of typed  and M-Modal dictation.  Transcription errors may be present.   This note was also generated with the assistance of ambient listening technology. Verbal consent was obtained by the patient and accompanying visitor(s) for the recording of patient appointment to facilitate this note. I attest to having reviewed and edited the generated note for accuracy, though some syntax or spelling errors may persist. Please contact the author of this note for any clarification.    Assessment and Plan:   Assessment and Plan   Upper GI bleed 8/23/24 coil embolization L gastric artery  Acute blood loss anemia (ABLA)  Anemia in chronic kidney disease, on chronic dialysis  -status post hospitalization with EGD showing gastric ulcer status post blood transfusion.  Should be on high-dose Protonix b.i.d. has upcoming repeat EGD next week.  Outside hemoglobin has steadily increased.  No gross blood loss  -     pantoprazole (PROTONIX) 40 MG tablet; Take 1 tablet (40 mg total) by mouth 2 (two) times daily.  Dispense: 180 tablet; Refill: 3    Hemiplegia and hemiparesis following cerebral infarction affecting right dominant side  Right sided weakness  -he needs a new wheelchair as his falling apart. Gets through [employer].  Miguel Moore has a mobility limitation that significantly impairs her ability to participate in one or more mobility related activities of daily living (MRADLs) such as toileting, feeding, dressing, grooming, and bathing in customary locations in the home.  The mobility limitation cannot be sufficiently resolved by the use of a cane or walker.   The use of a manual wheelchair will significantly improve the patients ability to participate in MRADLS and the patient will use it on regular basis in the home.  Miguel Moore has expressed her willingness to use a manual wheelchair in the home. Patients upper body strength is sufficient for propulsion.  He/She also has a  caregiver who is available, willing, and able to provide assistance with the wheelchair when needed.    E-faxed to [facility]  He has [facility] home [facility] coming up for wound care for his leg ulcers.  He was told that his insurance would cut off his physical therapy after 30 days but may reconsider reinstating if he knee has subsequent home health order.  Hemiplegic.    Will order subsequent home health  Ulcerations he notes are healing, no longer taking narcotic  -     SUBSEQUENT HOME HEALTH ORDERS  -     WHEELCHAIR FOR HOME USE    Peripheral artery disease 5/16/24 LE angio with angioplasty popliteal artery  Dyslipidemia  -check nonfasting labs on statin  -     WHEELCHAIR FOR HOME USE    Paroxysmal atrial fibrillation  Chronic deep vein thrombosis (DVT) of popliteal vein of right lower extremity 9/21/20 outside [place]; unprovoked; anticoagulation  -on anticoagulation.  On PPI b.i.d. with a history of gastric ulcer.  Plan for indefinite anticoagulation    DM type 2 without retinopathy  -diet-controlled, last A1c good though confounder is ESRD/HD    ESRD (end stage renal disease)  -managed by Nephrology.  HD Monday Wednesday Friday    He was due for follow-up with Urology has an upcoming cystoscopy.  Previous plan with Urology was to check PSA.  Will check PSA with today's labs    Visit today included increased complexity associated with the care of the episodic problem addressed and managing the longitudinal care of the patient due to the serious and/or complex managed problem(s).    Medications Discontinued During This Encounter   Medication Reason    oxyCODONE-acetaminophen (PERCOCET)  mg per tablet     pantoprazole (PROTONIX) 40 MG tablet Reorder       meds sent this encounter:  Medications Ordered This Encounter   Medications    pantoprazole (PROTONIX) 40 MG tablet     Sig: Take 1 tablet (40 mg total) by mouth 2 (two) times daily.     Dispense:  180 tablet     Refill:  3     Pharmacy update refills, keep on file, not  requesting Rx to be filled today.         Follow Up:   Future Appointments   Date Time Provider Department Center   11/7/2024  9:00 AM Raciel Raymond MD [facility] FAM MED [place]   11/12/2024 10:30 AM Sabi Douglas DPM [facility] POD [place]   11/12/2024  3:00 PM Dipak Lim MD [facility] VAS [facility]   11/14/2024  9:00 AM LAB, [facility] LAB [facility]   11/19/2024  2:30 PM Lee Styles MD [facility] URO [facility]          Subjective:   Subjective   Chief Complaint   Patient presents with    Follow-up     Hospital         HPI  Miguel is a 69 y.o. male.     Social History     Tobacco Use    Smoking status: Never    Smokeless tobacco: Never   Substance Use Topics    Alcohol use: No      Social History     Occupational History    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, etc      Social History     Social History Narrative    Lives with daughter       Patient Care Team:  Raciel Raymond MD as PCP - General (Internal Medicine)  Gabriella Manjarrez DPM (Inactive) as Consulting Physician (Podiatry)  Mac Chambers Jr., MD as Consulting Physician (Urology)  Geovany Rucker MD as Consulting Physician (Neurology)  Bob Tay MD as Consulting Physician (Ophthalmology)  Adrian Millard MD as Consulting Physician (Nephrology)  Lizy Lange as ED Navigator  Favorite, Maira MICHAELS RN as Outpatient   Deepti Trejo LCSW as Outpatient Case Management Social Worker    Last appointment with this clinic was 7/30/2024. Last visit with me 7/30/2024   To summarize last visit and events leading up to today:    Hypertension history of hydralazine, labetalol, amlodipine.  Stop the hydralazine 1st.  On most recent hospitalization and discharge 08/2024, stopped amlodipine and labetalol  3/2024 TTE EF 55-60%, mild LAE  Hyperlipidemia/statin  PAD  5/16/24 LE arterial US Sonogram suggest hemodynamically significant stenoses in the posterior tibial, peroneal and  dorsalis pedis arteries.   5/16/24 L and R angiogram Balloon angioplasty of the popliteal artery with 5x60 Ultraverse balloon  Saw vascular surgery 06/04/2024 in follow-up.  Stable.  Continue with wound care  Afib during hospitalization 3/2024 for abscess; amio and eliquis  ESRD/HD with secondary hyper PTH.  Followed by Nephrology.    Gets Mircera during dialysis every 2 weeks  Calcitriol 3 times a week during dialysis  Renvela   Diabetes diet controlled.  A1c not particularly reliable given dialysis status.  Seizures, has been rx'd keppra,  not taking.  He had a seizure after his stroke and does not sound like he has had 1 since.  Saw Neurology 9/2022 No change.  The seizures, continue Keppra.  History of stroke continue Lipitor Eliquis on Norvasc  Ordered colonoscopy  Unprovoked DVT right leg 09/2020.  On DOAC.  Needs colonoscopy  11/7/23 urology elevated PSA check pMRI  1/25/24 pMRI  1. No suspicious prostate lesion.  2. BPH.  3. Signal within the right seminal vesicle may represent hemorrhagic or proteinaceous material.  4. Additional findings as above.  Overall Assessment: PI-RADS 2 - Low (clinically significant cancer is unlikely to be present)   Saw urology in follow up 2/27/24. Urine abn odor.  UCx Klebsiella  History of stroke on statin and Plavix.  Change Plavix to aspirin.  Also on DOAC  Hemiplegia  Possible osteomyelitis finger  Saw NP 2/6/24 for finger nail bed pain after avulsion.  XR suspicious for osteo  Saw ortho 2/21/24. XR persistent lucency. Sent to hand  Saw hand 2/27/24 ordered MRI, referred to ID  Never got MRI  Never saw ID  Admit 3/20 through 4/19/24 R ankle abscess s/p ortho I+D, e coli and MRSA per ID; AFib/RVR amio and eliquis; transaminitis, hepatomegaly, hold amio and statin; amlodipine stopped; RLE art US with PAD needs vascular already sees leithead  CT ankle from 3/28 suggestive of cellulitis, no evidence of osteomyelitis, pt had new drainage from R ankle and was found to have an  abscess. Orthopedic surgery consulted.   Pt S/p I&D 4/4 and 4/6 with OR cx with Ecoli. Operative noted on 4/6 notable for necrotic tissue around distal fibula.   ID consulted-empiric MRSA coverage added given hx of MRSA. Orthopedic discontinued wound vac on 04/16  He has elevated liver enzymes - unclear etiology at this time, he is on multiple medications that can cause hepatotoxicity (amio, atorvastatin, abx, etc). Hepatitis panel nonreactive. US abdomen with Hepatosplenomegaly. GI consulted. Amiodarone and statin held. LFTs started improving. Continue to hold statins at the time of dc till f/u with PCP.   Hospitalization 5/10 through 5/24/2024 for wound infection.   Did have Staph epi in blood culture, likely contaminant. He does not want further debridement, wants BKA if needed. Started vancomycin and meropenem given his history of ESBL organisms. Ortho [facility] consulted. Vascular consulted- plan for RLE angiogram on 05/16 .   Patient went for I and D by ortho with cultures taken.  Id continued to follow and cultures negative.  Id recommending discharge with doxycycline for 14 days to complete a course.  to continue wound care with vascular, ID, and orthopedic follow up.  Patient endorsed understanding of plan and all questions answered prior to discharge home   5/16/24 LE arterial US Sonogram suggest hemodynamically significant stenoses in the posterior tibial, peroneal and dorsalis pedis arteries.   5/16/24 L and R angiogram Balloon angioplasty of the popliteal artery with 5x60 Ultraverse balloon  ED 07/08/2024 for constipation and dysuria.  Unable to obtain urine sample, Urology recommended empiric treatment.  Was given Bactrim renally dosed  Saw Urology in follow-up.  Consider outlet procedure.  Flomax and Proscar and cysto    7/30/24 OV  DM A1c normal but confounder - HD  History foot ulcer status post course of antibiotics.  Followed by home health wound care.  Takes Percocet 1 a day on average, I  have sent in a 30 day supply.  Not intend to be indefinite  Recent angioplasty.  Has upcoming follow-up with Podiatry as well as vascular  HTN stable not on meds.   Anemia of ESRD, oral iron continued.   History of stroke, DVT, AFib. On DOAC, per cardiology, continue DOAC and ASA    Hospitalization 8/21 to 8/31/24 for GIB, presenting with coffee ground emesis, dark stools.   Hospital Course:   68 y/o male with a PMHx of DVT, PAF on Eliquis, DMT2, HTN, ESRD, stroke admitted with GI bleed.  Hgb of 5.9, tachycardic and with active bleeding and admitted to ICU.  Started on Protonix drip.  GI consulted.  Nephrology consulted for HD.  Patient was taken for EGD that showed gastric ulcer with oozing hemorrhage, treated with a clip.  Patient was transfused 3 units of blood with good correction of H/H.  Eliquis held.  Will need to continue IV Protonix for 72 hours.until tomorrow.  Wound Care consulted for chronic LE wounds. H/H dropped and he was transfused pRBC. CTA abdomen/pelvis with no active hemorrhage but there is mucosal enhancement of gastric fundus. IR consulted and did angiogram that did not reveal active contrast extravasation but due to ongoing bleeding, elective coil embolization of left gastric artery was done on 8/23/24. Hb stabilized  and he was cleared to be restarted on eliquis. Still with ongoing dark tarry stool but Hb stable. Started on carafate.  No further dark tarry stools and hemoglobin remained stable with no further bleeding.  Patient was discharged home in stable condition.    Most recent outside labs   11/4/2024  Hemoglobin 14.0 - 18.0 g/dL 12.3 Low    Hemoglobin x 3 42.0 - 54.0 % 36.9 Low      Component      Latest Ref Rng 10/14/2024   Hemoglobin A1C External      4.8 - 5.9 % 4.9 (E)   PTH      16 - 80 pg/mL 354 (H) (E)      Legend:  (H) High  (E) External lab result    11/14/24 scheduled EGD    Has a manual wheelchair at home but with longer distances he had requested power wheelchair and I  previously referred him to Physical Medicine rehab for power wheelchair evaluation.  Does not appear that ever got set up.    Received request for manual wheelchair    Miguel Moore has a mobility limitation that significantly impairs his ability to participate in one or more mobility related activities of daily living (MRADLs) such as toileting, feeding, dressing, grooming, and bathing in customary locations in the home.  The mobility limitation cannot be sufficiently resolved by the use of a cane or walker.   The use of a manual wheelchair will significantly improve the patients ability to participate in MRADLS and the patient will use it on regular basis in the home.  Miguel Moore has expressed his willingness to use a manual wheelchair in the home. Patients upper body strength is sufficient for propulsion.  He/She also has a caregiver who is available, willing, and able to provide assistance with the wheelchair when needed.      Today's visit:    History of Present Illness    Miguel reports significant improvement in his condition since his last visit, when he required a stretcher for transportation. He is now able to ambulate minimally but needs to avoid compressing his feet. His left foot is nearly completely healed, while the right foot continues to heal with a small remaining area that is closing. Migeul is receiving home wound care from [facility].    Physical therapy, also provided by [facility], was discontinued after 30 days. Miguel expresses a desire to continue physical therapy, specifically mentioning a therapist named Manav, whom he found beneficial.    Miguel requires a new wheelchair urgently, as his current one is 5 years old and deteriorating. He requests a wheelchair with elevating leg rests.    An endoscopy is scheduled for next Thursday (the 14th) to check for recurrence of ulcers. A previous endoscopy during a recent hospital stay showed an ulcer with hemorrhage, for  "which he received a transfusion.    Miguel denies currently taking pain medications and is no longer in pain. He also denies any bleeding, chest pain, shortness of breath, coughing, and hemoptysis.    Miguel is on Finasteride and Tamsulosin for prostate issues. He is also taking Atorvastatin for cholesterol management. Renvela is prescribed 3 times daily with food to assist in breaking down calcium in food. Miguel is on Eliquis daily. He is also taking Pantoprazole (Protonix) 40 mg twice daily for ulcer prevention and to manage stomach acid, reflux, and heartburn.      ROS:  Cardiovascular: no chest pain  Respiratory: no cough, no shortness of breath         ALLERGIES AND MEDICATIONS: updated and reviewed.  Medication List with Changes/Refills   Current Medications    APIXABAN (ELIQUIS) 5 MG TAB    Take 1 tablet (5 mg total) by mouth 2 (two) times daily.    ATORVASTATIN (LIPITOR) 80 MG TABLET    Take 1 tablet (80 mg total) by mouth once daily.    FERROUS GLUCONATE (FERGON) 324 MG TABLET    Take 1 tablet (324 mg total) by mouth daily with breakfast.    FINASTERIDE (PROSCAR) 5 MG TABLET    Take 1 tablet (5 mg total) by mouth once daily.    NUZYRA 150 MG TAB    Take by mouth.    OXYCODONE-ACETAMINOPHEN (PERCOCET)  MG PER TABLET    Take 1 tablet by mouth every 24 hours as needed for Pain. Medically necessary more than 7 days    PANTOPRAZOLE (PROTONIX) 40 MG TABLET    Take 1 tablet (40 mg total) by mouth 2 (two) times daily.    RENVELA 800 MG TAB    Take 1 tablet (800 mg total) by mouth 3 (three) times daily with meals.    TAMSULOSIN (FLOMAX) 0.4 MG CAP    Take 2 capsules (0.8 mg total) by mouth once daily.    VITAMIN RENAL FORMULA, B-COMPLEX-VITAMIN C-FOLIC ACID, (NEPHROCAP) 1 MG CAP    Take 1 capsule by mouth once daily.         Objective:   Objective   Physical Exam   Vitals:    11/07/24 0826   BP: 138/60   Pulse: 89   Temp: 98.6 °F (37 °C)   TempSrc: Oral   SpO2: 99%   Height: 5' 8" (1.727 m)    Body mass " "index is 26.98 kg/m².      Height: 5' 8" (172.7 cm)     Physical Exam  Constitutional:       General: He is not in acute distress.     Appearance: He is well-developed.   Eyes:      Extraocular Movements: Extraocular movements intact.   Cardiovascular:      Rate and Rhythm: Normal rate and regular rhythm.      Heart sounds: Normal heart sounds. No murmur heard.  Pulmonary:      Effort: Pulmonary effort is normal.      Breath sounds: Normal breath sounds.   Musculoskeletal:         General: Deformity present.      Comments: Dense right hemiplegia.  Flexion contracture of the left upper extremity  In wheelchair I did not challenge him to stay   Skin:     General: Skin is warm and dry.   Neurological:      Mental Status: He is alert and oriented to person, place, and time.      Coordination: Coordination abnormal.      Comments: Dense right hemiplegia   Psychiatric:         Behavior: Behavior normal.         Thought Content: Thought content normal.                  "

## 2024-11-07 ENCOUNTER — OFFICE VISIT (OUTPATIENT)
Dept: FAMILY MEDICINE | Facility: CLINIC | Age: 69
End: 2024-11-07
Payer: MEDICARE

## 2024-11-07 ENCOUNTER — LAB VISIT (OUTPATIENT)
Dept: LAB | Facility: HOSPITAL | Age: 69
End: 2024-11-07
Attending: UROLOGY
Payer: MEDICARE

## 2024-11-07 VITALS
HEART RATE: 89 BPM | SYSTOLIC BLOOD PRESSURE: 138 MMHG | HEIGHT: 68 IN | BODY MASS INDEX: 26.98 KG/M2 | OXYGEN SATURATION: 99 % | DIASTOLIC BLOOD PRESSURE: 60 MMHG | TEMPERATURE: 99 F

## 2024-11-07 DIAGNOSIS — D63.1 ANEMIA IN CHRONIC KIDNEY DISEASE, ON CHRONIC DIALYSIS: Chronic | ICD-10-CM

## 2024-11-07 DIAGNOSIS — E11.9 DM TYPE 2 WITHOUT RETINOPATHY: ICD-10-CM

## 2024-11-07 DIAGNOSIS — Z99.2 ANEMIA IN CHRONIC KIDNEY DISEASE, ON CHRONIC DIALYSIS: Chronic | ICD-10-CM

## 2024-11-07 DIAGNOSIS — I73.9 PERIPHERAL ARTERY DISEASE: ICD-10-CM

## 2024-11-07 DIAGNOSIS — I48.0 PAROXYSMAL ATRIAL FIBRILLATION: Chronic | ICD-10-CM

## 2024-11-07 DIAGNOSIS — E78.5 DYSLIPIDEMIA: Chronic | ICD-10-CM

## 2024-11-07 DIAGNOSIS — R97.20 ELEVATED PSA: ICD-10-CM

## 2024-11-07 DIAGNOSIS — N18.6 ESRD (END STAGE RENAL DISEASE): Chronic | ICD-10-CM

## 2024-11-07 DIAGNOSIS — K92.2 UPPER GI BLEED: Primary | ICD-10-CM

## 2024-11-07 DIAGNOSIS — I69.351 HEMIPLEGIA AND HEMIPARESIS FOLLOWING CEREBRAL INFARCTION AFFECTING RIGHT DOMINANT SIDE: Chronic | ICD-10-CM

## 2024-11-07 DIAGNOSIS — N18.6 ANEMIA IN CHRONIC KIDNEY DISEASE, ON CHRONIC DIALYSIS: Chronic | ICD-10-CM

## 2024-11-07 DIAGNOSIS — I82.531 CHRONIC DEEP VEIN THROMBOSIS (DVT) OF POPLITEAL VEIN OF RIGHT LOWER EXTREMITY: ICD-10-CM

## 2024-11-07 DIAGNOSIS — D62 ACUTE BLOOD LOSS ANEMIA (ABLA): ICD-10-CM

## 2024-11-07 DIAGNOSIS — R53.1 RIGHT SIDED WEAKNESS: ICD-10-CM

## 2024-11-07 LAB
CHOLEST SERPL-MCNC: 168 MG/DL (ref 120–199)
CHOLEST/HDLC SERPL: 3.8 {RATIO} (ref 2–5)
COMPLEXED PSA SERPL-MCNC: 9.3 NG/ML (ref 0–4)
HDLC SERPL-MCNC: 44 MG/DL (ref 40–75)
HDLC SERPL: 26.2 % (ref 20–50)
LDLC SERPL CALC-MCNC: 113.4 MG/DL (ref 63–159)
NONHDLC SERPL-MCNC: 124 MG/DL
TRIGL SERPL-MCNC: 53 MG/DL (ref 30–150)

## 2024-11-07 PROCEDURE — 84153 ASSAY OF PSA TOTAL: CPT | Mod: HCNC | Performed by: UROLOGY

## 2024-11-07 PROCEDURE — 99999 PR PBB SHADOW E&M-EST. PATIENT-LVL III: CPT | Mod: PBBFAC,HCNC,, | Performed by: INTERNAL MEDICINE

## 2024-11-07 PROCEDURE — 36415 COLL VENOUS BLD VENIPUNCTURE: CPT | Mod: HCNC,PO | Performed by: UROLOGY

## 2024-11-07 PROCEDURE — 80061 LIPID PANEL: CPT | Mod: HCNC | Performed by: INTERNAL MEDICINE

## 2024-11-07 RX ORDER — PANTOPRAZOLE SODIUM 40 MG/1
40 TABLET, DELAYED RELEASE ORAL 2 TIMES DAILY
Qty: 180 TABLET | Refills: 3 | Status: SHIPPED | OUTPATIENT
Start: 2024-11-07 | End: 2025-11-07

## 2024-11-07 NOTE — PATIENT INSTRUCTIONS
PLEASE CHECK YOUR MEDICINES - YOU SHOULD BE TAKING PANTOPRAZOLE FOR ULCER PREVENTION - IT'S TWICE A DAY.

## 2024-11-08 NOTE — PROGRESS NOTES
Spoke to patient for pre-call to confirm scheduled Upper Endoscopy (EGD). Patient verbalized understanding of all instruction, including:  Patient is taking Eliquis (apixaban), instructed to take last dose on:  11/11/24.  Approval to hold Eliquis (apixaban) received from Raciel Raymond MD.

## 2024-11-11 ENCOUNTER — TELEPHONE (OUTPATIENT)
Dept: FAMILY MEDICINE | Facility: CLINIC | Age: 69
End: 2024-11-11
Payer: MEDICARE

## 2024-11-11 NOTE — TELEPHONE ENCOUNTER
----- Message from Vidya sent at 11/11/2024  2:00 PM CST -----  Type: Patient Call Back    Who called: self     What is the request in detail: pt stated that the wheelchair is waiting on his clinical notes and some other thing. Please call    Can the clinic reply by [facility]? No     Would the patient rather a call back or a response via [facility]?  call    Best call back number: .[phone] ([place])      Additional Information:

## 2024-11-13 ENCOUNTER — TELEPHONE (OUTPATIENT)
Dept: ENDOSCOPY | Facility: HOSPITAL | Age: 69
End: 2024-11-13
Payer: MEDICARE

## 2024-11-13 NOTE — TELEPHONE ENCOUNTER
Contacted the patient to reschedule an endoscopy procedure(s):  EGD. The patient did not answer the call. Voice message left requesting a call back.

## 2024-11-13 NOTE — TELEPHONE ENCOUNTER
EGD Procedure Prep Instructions      Date of procedure: 12/5/24 Arrive at: 10:30AM      Location of Department: [address] [place] [address] [place] [address]  Take the Elevators to 2nd Floor Endoscopy Procedural Area    How to prep:    Day Before Procedure: 12/4/24     You may have a light evening meal.   No solid food after 7:00 pm.   Continue drinking clear liquids.       Day of the Procedure:  12/5/24     You may have water/clear liquids until 2 hours before your procedure or as directed by the scheduling nurse  9:30AM. See below for list.    What You CANNOT do:   Do not drink milk or anything colored red.  Do not drink alcohol.  No gum chewing or candy morning of procedure.    Liquids That Are OK to Drink:   Water  Sports drinks (Gatorade, Power-Aid)  Coffee or tea (no cream or nondairy creamer)  Clear juices without pulp (apple, white grape)  Gelatin desserts (no fruit or toppings)  Clear soda (sprite, coke, ginger ale)  Chicken broth (until 12 midnight the night before procedure)      Comments:        IMPORTANT INFORMATION TO KNOW BEFORE YOUR PROCEDURE    [facility] 2nd Floor       When you arrive:  If your procedure is Monday - Friday 5am - 7pm - Please enter through the front door near [facility]. Please proceed up the first set of elevators to the 2nd floor where you will check in at the endo registration desk.     If your procedure is on a Saturday (weekend), enter through the back Outpatient entrance. Please note this entrance is diagonal from the Emergency Department entrance.              If your procedure requires the administration of anesthesia, it is necessary for a responsible adult to drive you home. (Medical Transportation, Uber, Lyft, Taxi, etc. may ONLY be used if a responsible adult is present to accompany you home.  The responsible adult CAN'T be the  of the service).      person must be available to return to pick you up within 15 minutes of being  notified of discharge.       Please bring a picture ID, insurance card, & copayment      Take Medications as directed below:          1) Stop taking Eliquis (apixaban) 2 days (prior to the procedure) on:  12/3/24         If you begin taking any blood thinning medications, injectable weight loss/diabetes medications (other than insulin) , or Adipex (Phentermine) please contact the endoscopy scheduling department listed below as soon as possible.    If you are diabetic see the attached instruction sheet regarding your medication.     If you take HEART, BLOOD PRESSURE, SEIZURE, PAIN, LUNG (including inhalers/nebulizers), ANTI-REJECTION (transplant patients), or PSYCHIATRIC medications, please take at your regular times with a sip of water or as directed by the scheduling nurse.     Important contact information:    Endoscopy [phone] Hours of operation Monday-Friday 8:00-4:30pm.    Questions about insurance or financial obligations call [phone] or [phone].    If you have questions regarding the prep or need to reschedule, please call [phone]. After hours questions requiring immediate assistance, contact [facility] On-Call nurse line at [phone] or [phone].   NOTE:     On occasion, unforeseen circumstances may cause a delay in your procedure start time. We respect your time and appreciate your patience during these circumstances.      Comments:

## 2024-11-13 NOTE — TELEPHONE ENCOUNTER
----- Message from Yenni sent at 11/13/2024 12:04 PM CST -----  Regarding: FW: r/s proc 11/14    ----- Message -----  From: Lilo Smith MA  Sent: 11/13/2024  10:37 AM CST  To: [facility] Endoscopist [facility]  Subject: r/s proc 11/14                                   Received a call from patient, he is requesting to reschedule his procedure that is scheduled for tomorrow 11/14 due to transportation.

## 2024-11-13 NOTE — TELEPHONE ENCOUNTER
Referral for procedure from [facility]      Spoke to patient to reschedule procedure(s) Upper Endoscopy (EGD)       Physician to perform procedure(s) Dr. DENA Chauhan  Date of Procedure (s) 12/5/24  Arrival Time 9:30 AM  Time of Procedure(s) 10:30 AM   Location of Procedure(s) [address]   Type of Rx Prep sent to patient: N/A  Instructions provided to patient via Postal Mail    Patient was informed on the following information and verbalized understanding. Screening questionnaire reviewed with patient and complete. If procedure requires anesthesia, a responsible adult needs to be present to accompany the patient home, patient cannot drive after receiving anesthesia. Appointment details are tentative, especially check-in time. Patient will receive a prep-op call 7 days prior to confirm check-in time for procedure. If applicable the patient should contact their pharmacy to verify Rx for procedure prep is ready for pick-up. Patient was advised to call the scheduling department at [phone] if pharmacy states no Rx is available. Patient was advised to call the endoscopy scheduling department if any questions or concerns arise.       Endoscopy Scheduling Department

## 2024-11-14 ENCOUNTER — TELEPHONE (OUTPATIENT)
Dept: FAMILY MEDICINE | Facility: CLINIC | Age: 69
End: 2024-11-14
Payer: MEDICARE

## 2024-11-14 DIAGNOSIS — E78.5 DYSLIPIDEMIA: Primary | Chronic | ICD-10-CM

## 2024-11-14 RX ORDER — EZETIMIBE 10 MG/1
10 TABLET ORAL DAILY
Qty: 90 TABLET | Refills: 3 | Status: SHIPPED | OUTPATIENT
Start: 2024-11-14 | End: 2025-11-14

## 2024-11-14 NOTE — TELEPHONE ENCOUNTER
Informed patient of cholesterol results and new medication zetia prescribed . Patient voiced understanding.

## 2024-11-14 NOTE — PROGRESS NOTES
Lipid better compared to previous.  Non  from previous 154  On high-dose atorvastatin  Would like to see LDL around 70.    Add Zetia

## 2024-11-14 NOTE — TELEPHONE ENCOUNTER
Please call the patient/family-his recent cholesterol was okay but I wanted to be lower.    Stay on his cholesterol medicine-atorvastatin  But I am going to add on Zetia.  It is a once a day pill.  He can take with his other medicines.    We will repeat his cholesterol at his follow-up with me

## 2024-11-15 ENCOUNTER — TELEPHONE (OUTPATIENT)
Dept: VASCULAR SURGERY | Facility: CLINIC | Age: 69
End: 2024-11-15
Payer: MEDICARE

## 2024-11-15 NOTE — TELEPHONE ENCOUNTER
Called and spoke with pt. States having issue with access at dialysis. States his wound to lt.leg healed and one on right healing. He has a home health nurse that takes care of his wounds. Pt. missed last few appt. to get ultrasound of hd access and follow up with vascular provider due to ride issues. Pt.aware I will speak with dialysis nurse to see what is going on and if has any issues prior to calling him back with response to go to the ER. Pt. verbalized understanding. Called and spoke with Joi at [facility] @ [phone]. Pt.has been having extended bleeding issue post dialysis treatment and his access numbers are decreasing. Aware  unavailable and will try to get him in to see  on Tuesday next week and pt.can start working on getting a ride. He will need ultrasound prior and have to check with department to see when can schedule his ultrasound. I will call pt.once I have appointments scheduled. If pt.any issues prior to his follow up appointment he needs to go to the ER. Joi verbalized understanding and will let pt.know. Message sent to  for further advice.- Pt.has ultrasound of HD access on Tuesday at 1:30 pm at [facility]. He is to go to outpatient registration to have test done. His appt.with vascular provider is after  for 230 pm. Called and spoke with Joi at [facility] and aware. Pt. currently getting his dialysis treatment and will give him message. Pt.working on getting ride for appts. If pt.has any issues prior he is to go to the ER.

## 2024-11-15 NOTE — TELEPHONE ENCOUNTER
----- Message from Paulo Zamorano sent at 11/15/2024  9:49 AM CST -----  Type:  Sooner Appointment Request    Patient is requesting a sooner appointment.  Patient declined first available appointment listed as well as another facility and provider .  Patient will not accept being placed on the waitlist and is requesting a message be sent to doctor.    Name of Caller:  Pt   When is the first available appointment?  None   Symptoms:  Procedure   Would the patient rather a call back or a response via [facility]?  Callbaclk  Best Call Back Number:  Telephone Information:  Mobile          [phone]     Additional Information:

## 2024-11-19 ENCOUNTER — HOSPITAL ENCOUNTER (EMERGENCY)
Facility: HOSPITAL | Age: 69
Discharge: HOME | End: 2024-11-19
Attending: STUDENT IN AN ORGANIZED HEALTH CARE EDUCATION/TRAINING PROGRAM
Payer: MEDICARE

## 2024-11-19 ENCOUNTER — OFFICE VISIT (OUTPATIENT)
Dept: VASCULAR SURGERY | Facility: CLINIC | Age: 69
End: 2024-11-19
Payer: MEDICARE

## 2024-11-19 VITALS
HEIGHT: 68 IN | RESPIRATION RATE: 20 BRPM | BODY MASS INDEX: 25.01 KG/M2 | DIASTOLIC BLOOD PRESSURE: 66 MMHG | WEIGHT: 165 LBS | TEMPERATURE: 98 F | OXYGEN SATURATION: 95 % | SYSTOLIC BLOOD PRESSURE: 122 MMHG | HEART RATE: 84 BPM

## 2024-11-19 VITALS
DIASTOLIC BLOOD PRESSURE: 85 MMHG | BODY MASS INDEX: 24.99 KG/M2 | SYSTOLIC BLOOD PRESSURE: 173 MMHG | HEIGHT: 68 IN | WEIGHT: 164.88 LBS | HEART RATE: 71 BPM

## 2024-11-19 DIAGNOSIS — N18.6 ESRD (END STAGE RENAL DISEASE): Primary | ICD-10-CM

## 2024-11-19 DIAGNOSIS — T82.590A MALFUNCTION OF ARTERIOVENOUS DIALYSIS FISTULA, INITIAL ENCOUNTER: Primary | ICD-10-CM

## 2024-11-19 PROCEDURE — 99284 EMERGENCY DEPT VISIT MOD MDM: CPT | Mod: 25,HCNC

## 2024-11-19 PROCEDURE — 99999 PR PBB SHADOW E&M-EST. PATIENT-LVL III: CPT | Mod: PBBFAC,HCNC,, | Performed by: SURGERY

## 2024-11-19 NOTE — DISCHARGE INSTRUCTIONS

## 2024-11-19 NOTE — ED PROVIDER NOTES
"Encounter Date: 11/19/2024       History     Chief Complaint   Patient presents with    Vascular Access Problem     Pt to ED reporting that he was scheduled for US of his L arm this morning to check his fistula to his L arm. Pt is a dialysis M/W/F. Last dialysis treatment was on yesterday and nurse told him that he needed an  US of his fistula because while on the machine he light was orange and not green. Denies any symptoms at this time. Registration told pt that he does not have an appt this morning for US so pt wanted to check in.      Patient is a 69-year-old with a history of ESRD, on HD MWF who presents needing an ultrasound of his fistula.  Patient reports he was supposed to obtain an outpatient ultrasound prior to a vascular surgery appointment at 2:30 p.m. today.  When he arrived to registration, they were no orders for him to get the ultrasound.  Patient reports over the last few days there has been some sort of" orange light" on the dialysis machine that is usually a green light.  Patient reports having spoken with his team and was told to come here for the ultrasound and then go to an appointment at 2:30    The history is provided by the patient and medical records. No  was used.     Review of patient's allergies indicates:   Allergen Reactions    Ace inhibitors      Hyperkalemia 8/2018  Other reaction(s): Unknown    Penicillins Hives     Tolerated cefepime and cefdinir previously    Tizanidine      Felt hot     Past Medical History:   Diagnosis Date    Allergy     Clotting disorder     Elaquis and APlavix    Deep vein thrombosis     Diabetes mellitus, type 2     Hypertension     Nuclear sclerosis of both eyes 6/9/2017    Renal disorder     Seizures     Stroke     Urinary tract infection      Past Surgical History:   Procedure Laterality Date    CATARACT EXTRACTION W/  INTRAOCULAR LENS IMPLANT Right 10/05/2017    Dr. Tay    CATARACT EXTRACTION W/  INTRAOCULAR LENS IMPLANT Left " 10/19/2017    Dr. Tay    CYSTOSCOPY W/ RETROGRADES Bilateral 2/18/2021    Procedure: CYSTOSCOPY, WITH RETROGRADE PYELOGRAM;  Surgeon: JEMMA Styles MD;  Location: [facility] OR;  Service: Urology;  Laterality: Bilateral;  REQUESTING EARLY AS POSSIBE-LO / 2/8/2021 @ 1:13PM  RN Pre Op 2-11-21, Covid NEGATIVE ON  2-17-21.  C A    ESOPHAGOGASTRODUODENOSCOPY N/A 8/17/2020    Procedure: EGD (ESOPHAGOGASTRODUODENOSCOPY);  Surgeon: Desmond Chapman MD;  Location: [facility] ENDO;  Service: Endoscopy;  Laterality: N/A;    ESOPHAGOGASTRODUODENOSCOPY N/A 8/21/2024    Procedure: EGD (ESOPHAGOGASTRODUODENOSCOPY);  Surgeon: Tonie Chauhan MD;  Location: [facility] ENDO;  Service: Endoscopy;  Laterality: N/A;    EYE SURGERY Bilateral     cataract    FEMORAL ARTERY STENT      FRACTURE SURGERY      INCISION AND DRAINAGE, LOWER EXTREMITY Right 4/4/2024    Procedure: INCISION AND DRAINAGE, LOWER EXTREMITY;  Surgeon: Jimbo Montilla III, MD;  Location: [facility] OR;  Service: Orthopedics;  Laterality: Right;    INCISION AND DRAINAGE, LOWER EXTREMITY Right 4/6/2024    Procedure: INCISION AND DRAINAGE, LOWER EXTREMITY;  Surgeon: Desmond Barillas MD;  Location: [facility] OR;  Service: Orthopedics;  Laterality: Right;  foot and ankle    INCISION AND DRAINAGE, LOWER EXTREMITY Right 4/9/2024    Procedure: INCISION AND DRAINAGE, LOWER EXTREMITY- FOOT & ANKLE;  Surgeon: Jimbo Montilla III, MD;  Location: [facility] OR;  Service: Orthopedics;  Laterality: Right;  CULTURES, VASCHE    INCISION AND DRAINAGE, LOWER EXTREMITY Right 5/21/2024    Procedure: INCISION AND DRAINAGE, LOWER EXTREMITY;  Surgeon: Jimbo Montilla III, MD;  Location: [facility] OR;  Service: Orthopedics;  Laterality: Right;  Pulsavac and Vashe    KNEE SURGERY      bilateral scope    WOUND DRESSING Right 4/9/2024    Procedure: WOUND VAC EXCHANGE;  Surgeon: Jimbo Montilla III, MD;  Location: [facility] OR;  Service: Orthopedics;  Laterality: Right;     Family History   Problem Relation Name Age of  Onset    Diabetes Mother      Heart disease Mother      Hypertension Mother      Cataracts Mother      No Known Problems Father      Hypertension Sister      No Known Problems Brother      No Known Problems Maternal Aunt      No Known Problems Maternal Uncle      No Known Problems Paternal Aunt      No Known Problems Paternal Uncle      No Known Problems Maternal Grandmother      No Known Problems Maternal Grandfather      No Known Problems Paternal Grandmother      No Known Problems Paternal Grandfather      No Known Problems Daughter      No Known Problems Other      Amblyopia Neg Hx      Blindness Neg Hx      Cancer Neg Hx      Glaucoma Neg Hx      Macular degeneration Neg Hx      Retinal detachment Neg Hx      Strabismus Neg Hx      Stroke Neg Hx      Thyroid disease Neg Hx       Social History     Tobacco Use    Smoking status: Never    Smokeless tobacco: Never   Substance Use Topics    Alcohol use: No    Drug use: No     Review of Systems   Constitutional:  Negative for fever.   HENT:  Negative for sore throat.    Respiratory:  Negative for shortness of breath.    Cardiovascular:  Negative for chest pain.   Gastrointestinal:  Negative for nausea.   Genitourinary:  Negative for dysuria.   Musculoskeletal:  Negative for back pain.   Skin:  Negative for rash.   Neurological:  Negative for weakness.   Hematological:  Does not bruise/bleed easily.       Physical Exam     Initial Vitals [11/19/24 0828]   BP Pulse Resp Temp SpO2   122/66 84 20 98.2 °F (36.8 °C) 95 %      MAP       --         Physical Exam    Constitutional: Vital signs are normal. No distress.   HENT:   Head: Normocephalic and atraumatic.   Eyes: EOM are normal.   Neck:   Normal range of motion.  Cardiovascular:  Normal rate, regular rhythm and intact distal pulses.           LUE AV fistula with a palpable thrill   Pulmonary/Chest: Breath sounds normal. No respiratory distress.   Abdominal: Abdomen is soft. There is no abdominal tenderness.    Musculoskeletal:         General: No edema. Normal range of motion.      Cervical back: Normal range of motion.     Neurological: He is alert and oriented to person, place, and time. GCS score is 15. GCS eye subscore is 4. GCS verbal subscore is 5. GCS motor subscore is 6.   Skin: Skin is warm and dry.         ED Course   Procedures  Labs Reviewed - No data to display       Imaging Results              US Hemodialysis Access (In process)                      Medications - No data to display  Medical Decision Making  Miguel Moore is a 69 y.o. male with h/o ESRD on HD who presents to the ED with a problem with his fistula    Initial vitals reassuring. Physical exam reveals well-appearing man with a left upper extremity fistula with a palpable thrill.     Ddx includes but is not limited to stenosis, clot. Given history, physical exam, vital signs and chart review, there is low concern for infection, hemorrhage.     I will obtain the following to better assess:  Ultrasound of patient's fistula.    DISCLAIMER: This note was prepared with [employer] voice recognition transcription software. Garbled syntax, mangled pronouns, and other bizarre constructions may be attributed to that software system.      Amount and/or Complexity of Data Reviewed  External Data Reviewed: notes.               ED Course as of 11/19/24 1636   Tue Nov 19, 2024   1415 Patient has follow up with his vascular surgeon at 230.  Still no ultrasound read from Radiology however per the techs evaluation, there is narrowing on the ultrasound.  Patient's fistula has a palpable thrill and has been functional for dialysis.  This ultrasound was supposed to be done outpatient, no concern for emergent findings on the ultrasound.  We will discharge to go to his vascular surgery appointment  [KI]      ED Course User Index  [KI] Lina Lovelace MD                           Clinical Impression:  Final diagnoses:  [T82.590A] Malfunction of arteriovenous  dialysis fistula, initial encounter (Primary)          ED Disposition Condition    Discharge Stable          ED Prescriptions    None       Follow-up Information       Follow up With Specialties Details Why Contact Info    Emil Almanzar MD Vascular Surgery, Cardiothoracic Surgery Go today  [address] [facility] [address]  [facility] [address]  [phone]               Lina Lovelace MD  11/19/24 9397

## 2024-11-19 NOTE — PROGRESS NOTES
Emil Almanzar MD, PhD  [facility]   11/19/2024    HPI:  Miguel Moore is a 69 y.o. male with a history of end-stage renal disease and peripheral arterial disease, who was referred by his dialysis center due to concerns with dialysis  History of right popliteal artery angioplasty with Dr. ELLIOTT  may 2024  Patient has a left upper extremity AV fistula which she uses for dialysis.  He was not noted any trouble is but was told by his dialysis center that he should have his fistula evaluated.  He does note that it has become a little bit more aneurysmal over the past few months    Of note patient is on Eliquis  Patient Active Problem List   Diagnosis    Benign essential HTN    Dyslipidemia    BPH (benign prostatic hyperplasia) 2/18/21 prostate bx normal    Seizure disorder as sequela of cerebrovascular accident    Hemiplegia and hemiparesis following cerebral infarction affecting right dominant side    Microcytosis 9/2019 labs c/w AoCD and AoCKD    ESRD (end stage renal disease)    Nuclear sclerosis, left    Cortical cataract of both eyes    DM type 2 without retinopathy    Secondary hyperparathyroidism of renal origin    Vitamin D deficiency    Right sided weakness    Senile nuclear sclerosis    CME (cystoid macular edema), bilateral    Refractive error    History of fungal infection candida parasilosis hospitalization 8/2020    Chronic deep vein thrombosis (DVT) of popliteal vein of right lower extremity 9/21/20 outside US; unprovoked; anticoagulation    Pulmonary nodule 1/2021 4 mm nodule.    Elevated PSA 2/18/21 prostate bx normal    Anemia in chronic kidney disease    History of diabetic ulcer of foot    Pseudophakia    Bilateral posterior capsular opacification    Paroxysmal atrial fibrillation    Peripheral artery disease 5/16/24 LE angio with angioplasty popliteal artery    Abdominal aortic atherosclerosis on CT 4/1/24    Upper GI bleed 8/23/24 coil embolization L gastric artery    Wounds,  multiple    Acute blood loss anemia (ABLA)     Past Medical History:   Diagnosis Date    Allergy     Clotting disorder     Elaquis and APlavix    Deep vein thrombosis     Diabetes mellitus, type 2     Hypertension     Nuclear sclerosis of both eyes 6/9/2017    Renal disorder     Seizures     Stroke     Urinary tract infection      Past Surgical History:   Procedure Laterality Date    CATARACT EXTRACTION W/  INTRAOCULAR LENS IMPLANT Right 10/05/2017    Dr. Tay    CATARACT EXTRACTION W/  INTRAOCULAR LENS IMPLANT Left 10/19/2017    Dr. Tay    CYSTOSCOPY W/ RETROGRADES Bilateral 2/18/2021    Procedure: CYSTOSCOPY, WITH RETROGRADE PYELOGRAM;  Surgeon: JEMMA Styles MD;  Location: [facility] OR;  Service: Urology;  Laterality: Bilateral;  REQUESTING EARLY AS POSSIBE-LO / 2/8/2021 @ 1:13PM  RN Pre Op 2-11-21, Covid NEGATIVE ON  2-17-21.  C A    ESOPHAGOGASTRODUODENOSCOPY N/A 8/17/2020    Procedure: EGD (ESOPHAGOGASTRODUODENOSCOPY);  Surgeon: Desmond Chapman MD;  Location: [facility] ENDO;  Service: Endoscopy;  Laterality: N/A;    ESOPHAGOGASTRODUODENOSCOPY N/A 8/21/2024    Procedure: EGD (ESOPHAGOGASTRODUODENOSCOPY);  Surgeon: Tonie Chauhan MD;  Location: [facility] ENDO;  Service: Endoscopy;  Laterality: N/A;    EYE SURGERY Bilateral     cataract    FEMORAL ARTERY STENT      FRACTURE SURGERY      INCISION AND DRAINAGE, LOWER EXTREMITY Right 4/4/2024    Procedure: INCISION AND DRAINAGE, LOWER EXTREMITY;  Surgeon: Jimbo Montilla III, MD;  Location: [facility] OR;  Service: Orthopedics;  Laterality: Right;    INCISION AND DRAINAGE, LOWER EXTREMITY Right 4/6/2024    Procedure: INCISION AND DRAINAGE, LOWER EXTREMITY;  Surgeon: Desmond Barillas MD;  Location: [facility] OR;  Service: Orthopedics;  Laterality: Right;  foot and ankle    INCISION AND DRAINAGE, LOWER EXTREMITY Right 4/9/2024    Procedure: INCISION AND DRAINAGE, LOWER EXTREMITY- FOOT & ANKLE;  Surgeon: Jimbo Montilla III, MD;  Location: [facility] OR;  Service:  Orthopedics;  Laterality: Right;  CULTURES, VASCHE    INCISION AND DRAINAGE, LOWER EXTREMITY Right 5/21/2024    Procedure: INCISION AND DRAINAGE, LOWER EXTREMITY;  Surgeon: Jimbo Montilla III, MD;  Location: [facility] OR;  Service: Orthopedics;  Laterality: Right;  Pulsavac and Vashe    KNEE SURGERY      bilateral scope    WOUND DRESSING Right 4/9/2024    Procedure: WOUND VAC EXCHANGE;  Surgeon: Jimbo Montilla III, MD;  Location: [facility] OR;  Service: Orthopedics;  Laterality: Right;     Family History   Problem Relation Name Age of Onset    Diabetes Mother      Heart disease Mother      Hypertension Mother      Cataracts Mother      No Known Problems Father      Hypertension Sister      No Known Problems Brother      No Known Problems Maternal Aunt      No Known Problems Maternal Uncle      No Known Problems Paternal Aunt      No Known Problems Paternal Uncle      No Known Problems Maternal Grandmother      No Known Problems Maternal Grandfather      No Known Problems Paternal Grandmother      No Known Problems Paternal Grandfather      No Known Problems Daughter      No Known Problems Other      Amblyopia Neg Hx      Blindness Neg Hx      Cancer Neg Hx      Glaucoma Neg Hx      Macular degeneration Neg Hx      Retinal detachment Neg Hx      Strabismus Neg Hx      Stroke Neg Hx      Thyroid disease Neg Hx       Social History     Socioeconomic History    Marital status: Single   Occupational History    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, etc   Tobacco Use    Smoking status: Never    Smokeless tobacco: Never   Substance and Sexual Activity    Alcohol use: No    Drug use: No   Social History Narrative    Lives with daughter     Social Drivers of Health     Financial Resource Strain: Medium Risk (9/10/2024)    Overall Financial Resource Strain (CARDIA)     Difficulty of Paying Living Expenses: Somewhat hard   Food Insecurity: Food Insecurity Present (9/10/2024)    Hunger Vital Sign     Worried About  Running Out of Food in the Last Year: Sometimes true     Ran Out of Food in the Last Year: Never true   Transportation Needs: Unmet Transportation Needs (9/10/2024)    PRAPARE - Transportation     Lack of Transportation (Medical): Yes     Lack of Transportation (Non-Medical): No   Physical Activity: Insufficiently Active (9/10/2024)    Exercise Vital Sign     Days of Exercise per Week: 4 days     Minutes of Exercise per Session: 10 min   Stress: No Stress Concern Present (9/10/2024)    [employer] - Occupational Stress Questionnaire     Feeling of Stress : Not at all   Housing Stability: Low Risk  (9/10/2024)    Housing Stability Vital Sign     Unable to Pay for Housing in the Last Year: No     Homeless in the Last Year: No       Current Outpatient Medications:     apixaban (ELIQUIS) 5 mg Tab, Take 1 tablet (5 mg total) by mouth 2 (two) times daily., Disp: 60 tablet, Rfl: 3    atorvastatin (LIPITOR) 80 MG tablet, Take 1 tablet (80 mg total) by mouth once daily., Disp: 90 tablet, Rfl: 3    ezetimibe (ZETIA) 10 mg tablet, Take 1 tablet (10 mg total) by mouth once daily. Continue taking Atorvastatin, Disp: 90 tablet, Rfl: 3    ferrous gluconate (FERGON) 324 MG tablet, Take 1 tablet (324 mg total) by mouth daily with breakfast., Disp: 90 tablet, Rfl: 1    finasteride (PROSCAR) 5 mg tablet, Take 1 tablet (5 mg total) by mouth once daily., Disp: 90 tablet, Rfl: 3    NUZYRA 150 mg Tab, Take by mouth., Disp: , Rfl:     pantoprazole (PROTONIX) 40 MG tablet, Take 1 tablet (40 mg total) by mouth 2 (two) times daily., Disp: 180 tablet, Rfl: 3    RENVELA 800 mg Tab, Take 1 tablet (800 mg total) by mouth 3 (three) times daily with meals., Disp: 90 tablet, Rfl: 0    tamsulosin (FLOMAX) 0.4 mg Cap, Take 2 capsules (0.8 mg total) by mouth once daily., Disp: 180 capsule, Rfl: 3    vitamin renal formula, B-complex-vitamin c-folic acid, (NEPHROCAP) 1 mg Cap, Take 1 capsule by mouth once daily., Disp: 30  "capsule, Rfl: 11    REVIEW OF SYSTEMS:  ROS       VITALS:  Vitals:    11/19/24 1434   BP: (!) 173/85   BP Location: Right arm   Patient Position: Sitting   Pulse: 71   Weight: 74.8 kg (164 lb 14.5 oz)   Height: 5' 8" (1.727 m)        PHYSICAL EXAM:   Physical Exam         LAB RESULTS:  No results found for: "CBC"  Lab Results   Component Value Date    LABPROT 12.2 08/21/2024    INR 1.1 08/21/2024     Lab Results   Component Value Date     08/31/2024    K 4.6 08/31/2024     08/31/2024    CO2 22 (L) 08/31/2024     08/31/2024    BUN 26 (H) 08/31/2024    CREATININE 5.2 (H) 08/31/2024    CALCIUM 8.3 (L) 08/31/2024    ANIONGAP 8 08/31/2024    EGFRNONAA 11 (A) 02/11/2021     Lab Results   Component Value Date    WBC 4.86 08/31/2024    RBC 2.89 (L) 08/31/2024    HGB 12.1 (L) 11/18/2024    HCT 25.3 (L) 08/31/2024    MCV 88 08/31/2024    MCH 28.4 08/31/2024    MCHC 32.4 08/31/2024    RDW 17.2 (H) 08/31/2024     08/31/2024    MPV 9.5 08/31/2024    GRAN 3.1 08/31/2024    GRAN 62.7 08/31/2024    LYMPH 1.1 08/31/2024    LYMPH 23.3 08/31/2024    MONO 0.4 08/31/2024    MONO 7.4 08/31/2024    EOS 0.3 08/31/2024    BASO 0.03 08/31/2024    EOSINOPHIL 5.8 08/31/2024    BASOPHIL 0.6 08/31/2024    DIFFMETHOD Automated 08/31/2024     .  Lab Results   Component Value Date    HGBA1C 4.9 10/14/2024       IMAGING:  All pertinent imaging has been reviewed and interpreted independently.      IMP/PLAN:  Migeul Moore is a 69 y.o. male with aneurysmal left arm AV fistula.  Suspect outflow stenosis based on clinical exam .  Recommend left arm AV fistulagram.  Unfortunately patient was unavailable on dates that I have availability.    Plan:    We will schedule outpatient fistulogram with Dr. Lim with the next few weeks.Of note patient is on Eliquis            I spent 45 minutes evaluating this patient and greater than 50% of the time was spent counseling, coordinator care and discussing the plan of care.  All " questions were answered and patient stated understanding with agreement with the above treatment plan.    Emil Almanzar MD, PhD  Vascular and Endovascular Surgery

## 2024-11-19 NOTE — H&P (VIEW-ONLY)
Emil Almanzar MD, PhD  [facility]   11/19/2024    HPI:  Miguel Moore is a 69 y.o. male with a history of end-stage renal disease and peripheral arterial disease, who was referred by his dialysis center due to concerns with dialysis  History of right popliteal artery angioplasty with Dr. ELLIOTT  may 2024  Patient has a left upper extremity AV fistula which she uses for dialysis.  He was not noted any trouble is but was told by his dialysis center that he should have his fistula evaluated.  He does note that it has become a little bit more aneurysmal over the past few months    Of note patient is on Eliquis  Patient Active Problem List   Diagnosis    Benign essential HTN    Dyslipidemia    BPH (benign prostatic hyperplasia) 2/18/21 prostate bx normal    Seizure disorder as sequela of cerebrovascular accident    Hemiplegia and hemiparesis following cerebral infarction affecting right dominant side    Microcytosis 9/2019 labs c/w AoCD and AoCKD    ESRD (end stage renal disease)    Nuclear sclerosis, left    Cortical cataract of both eyes    DM type 2 without retinopathy    Secondary hyperparathyroidism of renal origin    Vitamin D deficiency    Right sided weakness    Senile nuclear sclerosis    CME (cystoid macular edema), bilateral    Refractive error    History of fungal infection candida parasilosis hospitalization 8/2020    Chronic deep vein thrombosis (DVT) of popliteal vein of right lower extremity 9/21/20 outside [place]; unprovoked; anticoagulation    Pulmonary nodule 1/2021 4 mm nodule.    Elevated PSA 2/18/21 prostate bx normal    Anemia in chronic kidney disease    History of diabetic ulcer of foot    Pseudophakia    Bilateral posterior capsular opacification    Paroxysmal atrial fibrillation    Peripheral artery disease 5/16/24 LE angio with angioplasty popliteal artery    Abdominal aortic atherosclerosis on CT 4/1/24    Upper GI bleed 8/23/24 coil embolization L gastric artery    Wounds,  multiple    Acute blood loss anemia (ABLA)     Past Medical History:   Diagnosis Date    Allergy     Clotting disorder     Elaquis and APlavix    Deep vein thrombosis     Diabetes mellitus, type 2     Hypertension     Nuclear sclerosis of both eyes 6/9/2017    Renal disorder     Seizures     Stroke     Urinary tract infection      Past Surgical History:   Procedure Laterality Date    CATARACT EXTRACTION W/  INTRAOCULAR LENS IMPLANT Right 10/05/2017    Dr. Tay    CATARACT EXTRACTION W/  INTRAOCULAR LENS IMPLANT Left 10/19/2017    Dr. Tay    CYSTOSCOPY W/ RETROGRADES Bilateral 2/18/2021    Procedure: CYSTOSCOPY, WITH RETROGRADE PYELOGRAM;  Surgeon: JEMMA Styles MD;  Location: [facility] OR;  Service: Urology;  Laterality: Bilateral;  REQUESTING EARLY AS POSSIBE-LO / 2/8/2021 @ 1:13PM  RN Pre Op 2-11-21, Covid NEGATIVE ON  2-17-21.  C A    ESOPHAGOGASTRODUODENOSCOPY N/A 8/17/2020    Procedure: EGD (ESOPHAGOGASTRODUODENOSCOPY);  Surgeon: Desmond Chapman MD;  Location: [facility] ENDO;  Service: Endoscopy;  Laterality: N/A;    ESOPHAGOGASTRODUODENOSCOPY N/A 8/21/2024    Procedure: EGD (ESOPHAGOGASTRODUODENOSCOPY);  Surgeon: Tonie Chauhan MD;  Location: [facility] ENDO;  Service: Endoscopy;  Laterality: N/A;    EYE SURGERY Bilateral     cataract    FEMORAL ARTERY STENT      FRACTURE SURGERY      INCISION AND DRAINAGE, LOWER EXTREMITY Right 4/4/2024    Procedure: INCISION AND DRAINAGE, LOWER EXTREMITY;  Surgeon: Jimbo Montilla III, MD;  Location: [facility] OR;  Service: Orthopedics;  Laterality: Right;    INCISION AND DRAINAGE, LOWER EXTREMITY Right 4/6/2024    Procedure: INCISION AND DRAINAGE, LOWER EXTREMITY;  Surgeon: Desmond Barillas MD;  Location: [facility] OR;  Service: Orthopedics;  Laterality: Right;  foot and ankle    INCISION AND DRAINAGE, LOWER EXTREMITY Right 4/9/2024    Procedure: INCISION AND DRAINAGE, LOWER EXTREMITY- FOOT & ANKLE;  Surgeon: Jimbo Montilla III, MD;  Location: [facility] OR;  Service:  Orthopedics;  Laterality: Right;  CULTURES, VASCHE    INCISION AND DRAINAGE, LOWER EXTREMITY Right 5/21/2024    Procedure: INCISION AND DRAINAGE, LOWER EXTREMITY;  Surgeon: Jimbo Montilla III, MD;  [facility];  Service: Orthopedics;  Laterality: Right;  Pulsavac and Vashe    KNEE SURGERY      bilateral scope    WOUND DRESSING Right 4/9/2024    Procedure: WOUND VAC EXCHANGE;  Surgeon: Jimbo Montilla III, MD;  Location: [facility] OR;  Service: Orthopedics;  Laterality: Right;     Family History   Problem Relation Name Age of Onset    Diabetes Mother      Heart disease Mother      Hypertension Mother      Cataracts Mother      No Known Problems Father      Hypertension Sister      No Known Problems Brother      No Known Problems Maternal Aunt      No Known Problems Maternal Uncle      No Known Problems Paternal Aunt      No Known Problems Paternal Uncle      No Known Problems Maternal Grandmother      No Known Problems Maternal Grandfather      No Known Problems Paternal Grandmother      No Known Problems Paternal Grandfather      No Known Problems Daughter      No Known Problems Other      Amblyopia Neg Hx      Blindness Neg Hx      Cancer Neg Hx      Glaucoma Neg Hx      Macular degeneration Neg Hx      Retinal detachment Neg Hx      Strabismus Neg Hx      Stroke Neg Hx      Thyroid disease Neg Hx       Social History     Socioeconomic History    Marital status: Single   Occupational History    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, etc   Tobacco Use    Smoking status: Never    Smokeless tobacco: Never   Substance and Sexual Activity    Alcohol use: No    Drug use: No   Social History Narrative    Lives with daughter     Social Drivers of Health     Financial Resource Strain: Medium Risk (9/10/2024)    Overall Financial Resource Strain (CARDIA)     Difficulty of Paying Living Expenses: Somewhat hard   Food Insecurity: Food Insecurity Present (9/10/2024)    Hunger Vital Sign     Worried About  Running Out of Food in the Last Year: Sometimes true     Ran Out of Food in the Last Year: Never true   Transportation Needs: Unmet Transportation Needs (9/10/2024)    PRAPARE - Transportation     Lack of Transportation (Medical): Yes     Lack of Transportation (Non-Medical): No   Physical Activity: Insufficiently Active (9/10/2024)    Exercise Vital Sign     Days of Exercise per Week: 4 days     Minutes of Exercise per Session: 10 min   Stress: No Stress Concern Present (9/10/2024)    [place] [employer] - Occupational Stress Questionnaire     Feeling of Stress : Not at all   Housing Stability: Low Risk  (9/10/2024)    Housing Stability Vital Sign     Unable to Pay for Housing in the Last Year: No     Homeless in the Last Year: No       Current Outpatient Medications:     apixaban (ELIQUIS) 5 mg Tab, Take 1 tablet (5 mg total) by mouth 2 (two) times daily., Disp: 60 tablet, Rfl: 3    atorvastatin (LIPITOR) 80 MG tablet, Take 1 tablet (80 mg total) by mouth once daily., Disp: 90 tablet, Rfl: 3    ezetimibe (ZETIA) 10 mg tablet, Take 1 tablet (10 mg total) by mouth once daily. Continue taking Atorvastatin, Disp: 90 tablet, Rfl: 3    ferrous gluconate (FERGON) 324 MG tablet, Take 1 tablet (324 mg total) by mouth daily with breakfast., Disp: 90 tablet, Rfl: 1    finasteride (PROSCAR) 5 mg tablet, Take 1 tablet (5 mg total) by mouth once daily., Disp: 90 tablet, Rfl: 3    NUZYRA 150 mg Tab, Take by mouth., Disp: , Rfl:     pantoprazole (PROTONIX) 40 MG tablet, Take 1 tablet (40 mg total) by mouth 2 (two) times daily., Disp: 180 tablet, Rfl: 3    RENVELA 800 mg Tab, Take 1 tablet (800 mg total) by mouth 3 (three) times daily with meals., Disp: 90 tablet, Rfl: 0    tamsulosin (FLOMAX) 0.4 mg Cap, Take 2 capsules (0.8 mg total) by mouth once daily., Disp: 180 capsule, Rfl: 3    vitamin renal formula, B-complex-vitamin c-folic acid, (NEPHROCAP) 1 mg Cap, Take 1 capsule by mouth once daily., Disp: 30  "capsule, Rfl: 11    REVIEW OF SYSTEMS:  ROS       VITALS:  Vitals:    11/19/24 1434   BP: (!) 173/85   BP Location: Right arm   Patient Position: Sitting   Pulse: 71   Weight: 74.8 kg (164 lb 14.5 oz)   Height: 5' 8" (1.727 m)        PHYSICAL EXAM:   Physical Exam         LAB RESULTS:  No results found for: "CBC"  Lab Results   Component Value Date    LABPROT 12.2 08/21/2024    INR 1.1 08/21/2024     Lab Results   Component Value Date     08/31/2024    K 4.6 08/31/2024     08/31/2024    CO2 22 (L) 08/31/2024     08/31/2024    BUN 26 (H) 08/31/2024    CREATININE 5.2 (H) 08/31/2024    CALCIUM 8.3 (L) 08/31/2024    ANIONGAP 8 08/31/2024    EGFRNONAA 11 (A) 02/11/2021     Lab Results   Component Value Date    WBC 4.86 08/31/2024    RBC 2.89 (L) 08/31/2024    HGB 12.1 (L) 11/18/2024    HCT 25.3 (L) 08/31/2024    MCV 88 08/31/2024    MCH 28.4 08/31/2024    MCHC 32.4 08/31/2024    RDW 17.2 (H) 08/31/2024     08/31/2024    MPV 9.5 08/31/2024    GRAN 3.1 08/31/2024    GRAN 62.7 08/31/2024    LYMPH 1.1 08/31/2024    LYMPH 23.3 08/31/2024    MONO 0.4 08/31/2024    MONO 7.4 08/31/2024    EOS 0.3 08/31/2024    BASO 0.03 08/31/2024    EOSINOPHIL 5.8 08/31/2024    BASOPHIL 0.6 08/31/2024    DIFFMETHOD Automated 08/31/2024     .  Lab Results   Component Value Date    HGBA1C 4.9 10/14/2024       IMAGING:  All pertinent imaging has been reviewed and interpreted independently.      IMP/PLAN:  Miguel Moore is a 69 y.o. male with aneurysmal left arm AV fistula.  Suspect outflow stenosis based on clinical exam .  Recommend left arm AV fistulagram.  Unfortunately patient was unavailable on dates that I have availability.    Plan:    We will schedule outpatient fistulogram with Dr. Lim with the next few weeks.Of note patient is on Eliquis            I spent 45 minutes evaluating this patient and greater than 50% of the time was spent counseling, coordinator care and discussing the plan of care.  All " questions were answered and patient stated understanding with agreement with the above treatment plan.    Emil Almanzar MD, PhD  Vascular [facility] Endovascular Surgery

## 2024-11-19 NOTE — ED NOTES
Miguel Moore, a 69 y.o. male presents to the ED w/ complaint of possible left hemodialysis fistula access problem. No other complaints at this time. Patient is AAOx3, VSS, NAD. Denies CP, SOB, N/V/D/C, cough, fever, numbness, or tingling.     Triage note:  Chief Complaint   Patient presents with    Vascular Access Problem     Pt to ED reporting that he was scheduled for US of his L arm this morning to check his fistula to his L arm. Pt is a dialysis M/W/F. Last dialysis treatment was on yesterday and nurse told him that he needed an  US of his fistula because while on the machine he light was orange and not green. Denies any symptoms at this time. Registration told pt that he does not have an appt this morning for US so pt wanted to check in.      Review of patient's allergies indicates:   Allergen Reactions    Ace inhibitors      Hyperkalemia 8/2018  Other reaction(s): Unknown    Penicillins Hives     Tolerated cefepime and cefdinir previously    Tizanidine      Felt hot     Past Medical History:   Diagnosis Date    Allergy     Clotting disorder     Elaquis and APlavix    Deep vein thrombosis     Diabetes mellitus, type 2     Hypertension     Nuclear sclerosis of both eyes 6/9/2017    Renal disorder     Seizures     Stroke     Urinary tract infection

## 2024-11-20 DIAGNOSIS — N18.6 ESRD (END STAGE RENAL DISEASE) ON DIALYSIS: ICD-10-CM

## 2024-11-20 DIAGNOSIS — N18.6 ESRD (END STAGE RENAL DISEASE): Primary | Chronic | ICD-10-CM

## 2024-11-20 DIAGNOSIS — Z99.2 ESRD (END STAGE RENAL DISEASE) ON DIALYSIS: ICD-10-CM

## 2024-11-20 RX ORDER — CLINDAMYCIN PHOSPHATE 900 MG/50ML
900 INJECTION, SOLUTION INTRAVENOUS
OUTPATIENT
Start: 2024-11-20 | End: 2024-11-20

## 2024-11-21 ENCOUNTER — TELEPHONE (OUTPATIENT)
Dept: VASCULAR SURGERY | Facility: CLINIC | Age: 69
End: 2024-11-21
Payer: MEDICARE

## 2024-11-21 NOTE — TELEPHONE ENCOUNTER
Called pt. and no answer. Left message to call our office. Called regarding instructions for LUE fistulogram procedure with  at [facility] on Tuesday 12/3/2024 @ 8 am. Arrival time 6 am to [facility] second floor. Pt.npo past midnight the night prior and will need some one to drive him home. Some one will call the afternoon prior to confirm times. Pt.has in person pre-op appt. on Tuesday 11/26/2024 @ 1:30 pm.

## 2024-11-22 ENCOUNTER — EXTERNAL HOME HEALTH (OUTPATIENT)
Dept: HOME HEALTH SERVICES | Facility: HOSPITAL | Age: 69
End: 2024-11-22
Payer: MEDICARE

## 2024-11-25 ENCOUNTER — TELEPHONE (OUTPATIENT)
Dept: FAMILY MEDICINE | Facility: CLINIC | Age: 69
End: 2024-11-25
Payer: MEDICARE

## 2024-11-25 DIAGNOSIS — R53.1 RIGHT SIDED WEAKNESS: Primary | ICD-10-CM

## 2024-11-25 DIAGNOSIS — I73.9 PERIPHERAL ARTERY DISEASE: ICD-10-CM

## 2024-11-25 DIAGNOSIS — I69.351 HEMIPLEGIA AND HEMIPARESIS FOLLOWING CEREBRAL INFARCTION AFFECTING RIGHT DOMINANT SIDE: ICD-10-CM

## 2024-11-25 NOTE — TELEPHONE ENCOUNTER
----- Message from [employer] sent at 11/25/2024  1:55 PM CST -----  Who called:self     What is the request in detail:pt states he received wheelchair Friday but needs a seat cushion     Can the clinic reply by [facility]?no    Would the patient rather a call back or a response via [facility]?call    Best call back number:.[phone]    Additional Information:

## 2024-11-26 ENCOUNTER — OUTPATIENT CASE MANAGEMENT (OUTPATIENT)
Dept: ADMINISTRATIVE | Facility: OTHER | Age: 69
End: 2024-11-26
Payer: MEDICARE

## 2024-11-26 NOTE — PROGRESS NOTES
Outpatient Care Management   - Care Plan Follow Up    Patient: Miguel Moore  MRN:  [id number]  Date of Service:  11/26/2024  Completed by:  Deepti Trejo LCSW  Referral Date: 08/31/2024    Reason for Visit   Patient presents with    OPCM MEGAN Follow Up Call     11/26/2024    Goals Complete     11/26/2024       Brief Summary: Spoke with sheryl who confirms he received his W/C. SW updated patient that his PCP office sent the W/C cushion order to [facility] yesterday. Patient denies any urgent financial strain, food insecurity, or transportation, although does report he had to reschedule his [employer] transportation recently due to their unavailability to bring him to an appt. Patient states he was able to reschedule his appt and will have transportation at that time. Patient denies any further needs or questions. SW will close case and notify OPCM RN.    Complex Care Plan     Care plan was discussed and completed today with input from patient and/or caregiver.    Patient Instructions     No follow-ups on file.

## 2024-11-27 ENCOUNTER — HOSPITAL ENCOUNTER (OUTPATIENT)
Dept: PREADMISSION TESTING | Facility: HOSPITAL | Age: 69
Discharge: HOME | End: 2024-11-27
Attending: SURGERY
Payer: MEDICARE

## 2024-11-27 VITALS — BODY MASS INDEX: 24.86 KG/M2 | HEIGHT: 68 IN | WEIGHT: 164 LBS

## 2024-11-27 DIAGNOSIS — Z01.818 PREOPERATIVE TESTING: Primary | ICD-10-CM

## 2024-11-27 DIAGNOSIS — Z79.01 ANTICOAGULANT LONG-TERM USE: ICD-10-CM

## 2024-11-27 LAB
ANION GAP SERPL CALC-SCNC: 16 MMOL/L (ref 8–16)
BASOPHILS # BLD AUTO: 0.04 K/UL (ref 0–0.2)
BASOPHILS NFR BLD: 0.7 % (ref 0–1.9)
BUN SERPL-MCNC: 32 MG/DL (ref 8–23)
CALCIUM SERPL-MCNC: 9.6 MG/DL (ref 8.7–10.5)
CHLORIDE SERPL-SCNC: 98 MMOL/L (ref 95–110)
CO2 SERPL-SCNC: 23 MMOL/L (ref 23–29)
CREAT SERPL-MCNC: 6.4 MG/DL (ref 0.5–1.4)
DIFFERENTIAL METHOD BLD: ABNORMAL
EOSINOPHIL # BLD AUTO: 0.1 K/UL (ref 0–0.5)
EOSINOPHIL NFR BLD: 1.4 % (ref 0–8)
ERYTHROCYTE [DISTWIDTH] IN BLOOD BY AUTOMATED COUNT: 15.3 % (ref 11.5–14.5)
EST. GFR  (NO RACE VARIABLE): 9 ML/MIN/1.73 M^2
GLUCOSE SERPL-MCNC: 155 MG/DL (ref 70–110)
HCT VFR BLD AUTO: 40.5 % (ref 40–54)
HGB BLD-MCNC: 13.7 G/DL (ref 14–18)
IMM GRANULOCYTES # BLD AUTO: 0.02 K/UL (ref 0–0.04)
IMM GRANULOCYTES NFR BLD AUTO: 0.4 % (ref 0–0.5)
INR PPP: 1 (ref 0.8–1.2)
LYMPHOCYTES # BLD AUTO: 0.9 K/UL (ref 1–4.8)
LYMPHOCYTES NFR BLD: 16 % (ref 18–48)
MCH RBC QN AUTO: 26 PG (ref 27–31)
MCHC RBC AUTO-ENTMCNC: 33.8 G/DL (ref 32–36)
MCV RBC AUTO: 77 FL (ref 82–98)
MONOCYTES # BLD AUTO: 0.5 K/UL (ref 0.3–1)
MONOCYTES NFR BLD: 8.3 % (ref 4–15)
NEUTROPHILS # BLD AUTO: 4.2 K/UL (ref 1.8–7.7)
NEUTROPHILS NFR BLD: 73.2 % (ref 38–73)
NRBC BLD-RTO: 0 /100 WBC
PLATELET # BLD AUTO: 169 K/UL (ref 150–450)
PMV BLD AUTO: 10.6 FL (ref 9.2–12.9)
POTASSIUM SERPL-SCNC: 4.3 MMOL/L (ref 3.5–5.1)
PROTHROMBIN TIME: 10.9 SEC (ref 9–12.5)
RBC # BLD AUTO: 5.27 M/UL (ref 4.6–6.2)
SODIUM SERPL-SCNC: 137 MMOL/L (ref 136–145)
WBC # BLD AUTO: 5.67 K/UL (ref 3.9–12.7)

## 2024-11-27 PROCEDURE — 85025 COMPLETE CBC W/AUTO DIFF WBC: CPT | Mod: HCNC | Performed by: SURGERY

## 2024-11-27 PROCEDURE — 85610 PROTHROMBIN TIME: CPT | Mod: HCNC | Performed by: SURGERY

## 2024-11-27 PROCEDURE — 80048 BASIC METABOLIC PNL TOTAL CA: CPT | Mod: HCNC | Performed by: SURGERY

## 2024-11-27 NOTE — DISCHARGE INSTRUCTIONS
ANGIOGRAM IN RADIOLOGY             YOUR PROCEDURE WILL BE AT [facility] at [address], [place]. [address]                                                       Enter through the Main Entrance facing [address].                   Report to the Same Day Surgery Registration Desk in the hallway.(Just beside the Same Day Surgery Unit)  ANGIOGRAM IN RADIOLOGY      Before 7 AM, enter through the Emergency Entrance..   After 7 AM enter through the Main Entrance.    Your procedure  is scheduled for ____12/3/2024_____________.Arrive at _____830 am_______.      You may have two visitors.  No children under 12 years old.       You will be going to the Same Day Surgery Unit on the 2nd floor of the hospital.    Important instructions:  Do not eat anything after midnight.  You may have water to take your medications.    Do not take any diabetic medication on the morning of surgery unless instructed to do so by your doctor or pre op nurse.    Please shower the night before and the morning of your surgery.      Use Chlorhexidine soap as instructed by your pre op nurse.   Please place clean linens on your bed the night before surgery. Please wear fresh clean clothing after each shower.    No shaving of procedural area at least 4-5 days before surgery due to increased risk of skin irritation and/or possible infection.    Contact lenses and removable denture work may not be worn during your procedure.    You may wear deodorant only.     Do not wear powder, body lotion, perfume/cologne or make-up.    Do not wear any jewelry or have any metal on your body.    You will be asked to remove any dentures or partials for the procedure.    If you are going home on the same day of surgery, you must arrange for a family member or a friend to drive you home.  Public transportation is prohibited.  You will not be able to drive home if you were given anesthesia or sedation.    Please leave money and valuables  home.      You may bring your cell phone.    Call the doctor if fever or illness should occur before your surgery.    Call [phone] to contact us here if needed.

## 2024-12-02 ENCOUNTER — TELEPHONE (OUTPATIENT)
Dept: INTERVENTIONAL RADIOLOGY/VASCULAR | Facility: HOSPITAL | Age: 69
End: 2024-12-02
Payer: MEDICARE

## 2024-12-03 ENCOUNTER — HOSPITAL ENCOUNTER (OUTPATIENT)
Dept: INTERVENTIONAL RADIOLOGY/VASCULAR | Facility: HOSPITAL | Age: 69
Discharge: HOME | End: 2024-12-03
Attending: SURGERY | Admitting: INTERNAL MEDICINE
Payer: MEDICARE

## 2024-12-03 ENCOUNTER — TELEPHONE (OUTPATIENT)
Dept: VASCULAR SURGERY | Facility: CLINIC | Age: 69
End: 2024-12-03
Payer: MEDICARE

## 2024-12-03 DIAGNOSIS — Z99.2 ESRD (END STAGE RENAL DISEASE) ON DIALYSIS: ICD-10-CM

## 2024-12-03 DIAGNOSIS — N18.6 ESRD (END STAGE RENAL DISEASE): ICD-10-CM

## 2024-12-03 DIAGNOSIS — N18.6 ESRD (END STAGE RENAL DISEASE) ON DIALYSIS: ICD-10-CM

## 2024-12-03 DIAGNOSIS — K25.9 GASTRIC ULCER: ICD-10-CM

## 2024-12-03 DIAGNOSIS — K25.9 GASTRIC ULCER, UNSPECIFIED CHRONICITY, UNSPECIFIED WHETHER GASTRIC ULCER HEMORRHAGE OR PERFORATION PRESENT: ICD-10-CM

## 2024-12-03 PROCEDURE — 99152 MOD SED SAME PHYS/QHP 5/>YRS: CPT | Mod: HCNC

## 2024-12-03 PROCEDURE — C1725 CATH, TRANSLUMIN NON-LASER: HCPCS | Mod: HCNC

## 2024-12-03 PROCEDURE — 99153 MOD SED SAME PHYS/QHP EA: CPT | Mod: HCNC

## 2024-12-03 PROCEDURE — C1894 INTRO/SHEATH, NON-LASER: HCPCS | Mod: HCNC

## 2024-12-03 PROCEDURE — 27201423 OPTIME MED/SURG SUP & DEVICES STERILE SUPPLY: Mod: HCNC

## 2024-12-03 PROCEDURE — 25500020 PHARM REV CODE 255: Mod: HCNC | Performed by: SURGERY

## 2024-12-03 PROCEDURE — C1769 GUIDE WIRE: HCPCS | Mod: HCNC

## 2024-12-03 PROCEDURE — 63600175 PHARM REV CODE 636 W HCPCS: Mod: HCNC | Performed by: SURGERY

## 2024-12-03 PROCEDURE — 25000003 PHARM REV CODE 250: Mod: HCNC | Performed by: SURGERY

## 2024-12-03 RX ORDER — MIDAZOLAM HYDROCHLORIDE 2 MG/2ML
INJECTION, SOLUTION INTRAMUSCULAR; INTRAVENOUS
Status: COMPLETED | OUTPATIENT
Start: 2024-12-03 | End: 2024-12-03

## 2024-12-03 RX ORDER — CLINDAMYCIN PHOSPHATE 900 MG/50ML
900 INJECTION, SOLUTION INTRAVENOUS
Status: COMPLETED | OUTPATIENT
Start: 2024-12-03 | End: 2024-12-03

## 2024-12-03 RX ORDER — NAPROXEN SODIUM 220 MG/1
81 TABLET, FILM COATED ORAL DAILY
COMMUNITY

## 2024-12-03 RX ORDER — LIDOCAINE HYDROCHLORIDE 10 MG/ML
INJECTION, SOLUTION INFILTRATION; PERINEURAL
Status: COMPLETED | OUTPATIENT
Start: 2024-12-03 | End: 2024-12-03

## 2024-12-03 RX ORDER — VERAPAMIL HYDROCHLORIDE 2.5 MG/ML
INJECTION, SOLUTION INTRAVENOUS
Status: COMPLETED | OUTPATIENT
Start: 2024-12-03 | End: 2024-12-03

## 2024-12-03 RX ORDER — HEPARIN SODIUM 1000 [USP'U]/ML
INJECTION, SOLUTION INTRAVENOUS; SUBCUTANEOUS
Status: COMPLETED | OUTPATIENT
Start: 2024-12-03 | End: 2024-12-03

## 2024-12-03 RX ORDER — FENTANYL CITRATE 50 UG/ML
INJECTION, SOLUTION INTRAMUSCULAR; INTRAVENOUS
Status: COMPLETED | OUTPATIENT
Start: 2024-12-03 | End: 2024-12-03

## 2024-12-03 RX ADMIN — VERAPAMIL HYDROCHLORIDE 2.5 MG: 2.5 INJECTION, SOLUTION INTRAVENOUS at 10:12

## 2024-12-03 RX ADMIN — HEPARIN SODIUM 3000 UNITS: 1000 INJECTION, SOLUTION INTRAVENOUS; SUBCUTANEOUS at 10:12

## 2024-12-03 RX ADMIN — IOHEXOL 88 ML: 350 INJECTION, SOLUTION INTRAVENOUS at 11:12

## 2024-12-03 RX ADMIN — NITROGLYCERIN 1 ML: 20 INJECTION INTRAVENOUS at 10:12

## 2024-12-03 RX ADMIN — LIDOCAINE HYDROCHLORIDE 10 ML: 10 INJECTION, SOLUTION INFILTRATION; PERINEURAL at 10:12

## 2024-12-03 RX ADMIN — MIDAZOLAM HYDROCHLORIDE 1 MG: 1 INJECTION, SOLUTION INTRAMUSCULAR; INTRAVENOUS at 10:12

## 2024-12-03 RX ADMIN — CLINDAMYCIN PHOSPHATE 900 MG: 900 INJECTION, SOLUTION INTRAVENOUS at 10:12

## 2024-12-03 RX ADMIN — FENTANYL CITRATE 50 MCG: 50 INJECTION, SOLUTION INTRAMUSCULAR; INTRAVENOUS at 10:12

## 2024-12-03 NOTE — INTERVAL H&P NOTE
The patient has been examined and the H&P has been reviewed:    I concur with the findings and no changes have occurred since H&P was written.    Mallampati Scale Class 2   ASA Classification Class III     Sedation Plan: Moderate sedation      There are no hospital problems to display for this patient.

## 2024-12-03 NOTE — PLAN OF CARE
Phone report given to [facility] nurse DARRION Canales, notified of procedure and closure device and hemostasis time. Pt denies pain at this time. No s/s of distress, denies dizziness/lightheadedness. Pt will be transported back to [facility] BED I via stretcher accompanied by primary nurse.

## 2024-12-03 NOTE — BRIEF OP NOTE
[facility] Interventional Radiology  Brief Operative Note    Surgery Date: 12/3/24    Surgeon: Dipak Lim MD    Assisting: None    Pre-op Diagnosis:  Arteriovenous fistula stenosis, subsequent encounter    Post-op Diagnosis:  same    Procedure:LUE fistulogram radial access, PTA of proximal fistula 6x40 balloon, cephalic arch with 6x40 and 7x80, subclavian vein 7x80 Ultraverse balloon    Anesthesia: Moderate sedation    Operative Findings: improved flow    Estimated Blood Loss: <10 cc         Specimens:   Specimen (24h ago, onward)      None              Discharge Note    OUTCOME: Patient tolerated treatment/procedure well without complication and is now ready for discharge.    DISPOSITION: Home or Self Care    FINAL DIAGNOSIS:  <principal problem not specified>    FOLLOWUP: In clinic    DISCHARGE INSTRUCTIONS:    Discharge Procedure Orders   Remove dressing in 48 hours     Activity as tolerated

## 2024-12-03 NOTE — PLAN OF CARE
Patient transported to IR Suite via stretcher accompanied by IR nurse DARRION Concepcion, consent signed and placed in chart prior to departure from [facility]. No s/s of distress upon arrival to IR suite. Pt greeted by IR staff upon arrival.

## 2024-12-03 NOTE — OP NOTE
Date: 12/03/2024     Surgeon(s) and Role:   Dipak Lim MD    Assistant: None    Pre-op Diagnosis:   1. T82.858A: Stenosis of vascular prosthetic devices, implants and grafts, initial encounter  2.   Patient Active Problem List    Diagnosis Date Noted    Acute blood loss anemia (ABLA) 08/23/2024    Wounds, multiple 08/22/2024    Upper GI bleed 8/23/24 coil embolization L gastric artery 08/21/2024    Peripheral artery disease 5/16/24 LE angio with angioplasty popliteal artery 04/25/2024    Abdominal aortic atherosclerosis on CT 4/1/24 04/25/2024    Paroxysmal atrial fibrillation 03/20/2024    Bilateral posterior capsular opacification 05/02/2023    Pseudophakia 01/06/2022    History of diabetic ulcer of foot     Elevated PSA 2/18/21 prostate bx normal 02/18/2021    Pulmonary nodule 1/2021 4 mm nodule. 01/06/2021    Chronic deep vein thrombosis (DVT) of popliteal vein of right lower extremity 9/21/20 outside US; unprovoked; anticoagulation 09/21/2020    History of fungal infection candida parasilosis hospitalization 8/2020 08/29/2020    Anemia in chronic kidney disease 08/26/2020    CME (cystoid macular edema), bilateral 11/16/2017    Refractive error 11/16/2017    Senile nuclear sclerosis 10/05/2017    Right sided weakness 09/11/2017    Secondary hyperparathyroidism of renal origin 08/03/2017    Vitamin D deficiency 08/03/2017    Nuclear sclerosis, left 06/09/2017    Cortical cataract of both eyes 06/09/2017    DM type 2 without retinopathy 06/09/2017    Microcytosis 9/2019 labs c/w AoCD and AoCKD 03/22/2017    ESRD (end stage renal disease) 03/22/2017    Benign essential HTN 03/21/2017    Dyslipidemia 03/21/2017    BPH (benign prostatic hyperplasia) 2/18/21 prostate bx normal 03/21/2017    Seizure disorder as sequela of cerebrovascular accident 03/21/2017    Hemiplegia and hemiparesis following cerebral infarction affecting right dominant side 03/21/2017          Post-op Diagnosis: Same     Procedure(s):   1.  US guided L radial artery percutaneous access  2. LUE fistulogram  3. Central venogram  4. Moderate sedation  5. Balloon angioplasty of proximal fistula 6x40 balloon, cephalic arch with 6x40 and 7x80, subclavian vein 7x80 Ultraverse balloons    Anesthesia: Local MAC     Findings/Key Components:   Successful treatment of > 75% stenosis  Strong palpable thrill     EBL: Minimal    PROCEDURE IN DETAIL:After an informed consent was obtained the patient was taken to the room and placed in the supine position.  Arm was prepped and draped in the standard surgical fashion. Under ultrasound guidance, the L radial artery was accessed with a micropuncture needle; ultrasound confirmed vessel patency, followed by placement of 4/3-Yakut micropuncture dilator. Through this, an 0.035-inch wire was placed in the short 6-Yakut sheath.   A fistulogram and central venogram was performed.  After independent review and interpretation, based upon the fistulogram results, I decided to intervene. Through this, an 0.035-inch Glidewire was placed through the high-grade stenosis, which was demonstrated by the angiogram.  A high-grade stenosis in the above areas was found, which was crossed with a hyrophilic 0.035-in glidewire. This was treated with the high-pressure, noncompliant balloon(s) listed above. Resolution of the stenosis was noted. Strong thrill could be felt. The sheath was removed, a vasc band was placed with good hemostasis.    MODERATE SEDATION   I was present for and monitored the patients cardio respiratory functions during the moderate sedation. See nurses notes for Intra-service start and end times, the medications their doseage and route.    Time of sedation:  60 mins.

## 2024-12-03 NOTE — DISCHARGE INSTRUCTIONS
Drink plenty of fluids for the next 48 hours and follow your doctor's diet orders.    Rest for the next 72 hours.     Remove the dressing in 24 hours, and you may shower. Clean the area with soap and water; apply an adhesive bandage over the puncture site for the  next 5 days.    No Lifting over 5-10 lbs., that is, not more than 1 gallon of water, or straining for 72 hours.    No driving, no drinking alcohol, and no signing legal documents for the next 24 hours.    Call your doctor for elevated temperature, shortness of breath, chest pain, or cold discolored arm/leg.     If severe bleeding occurs, lie down, apply pressure. Call 911 and request an ambulance to take you to the nearest  hospital emergency room.    Continue to take your regular medications as instructed.    Follow the instructions in the handout given to you.

## 2024-12-03 NOTE — Clinical Note
Left: Arm.   Scrubbed with Chlorhexidine/Alcohol.    Hair: N/A.  Skin prep dry before draping.  Prepped by: Dain Gonsales RT 12/3/2024 10:17 AM.

## 2024-12-03 NOTE — TELEPHONE ENCOUNTER
Called [facility] @ [phone] an unable to speak with anyone due to facility only open M/W/F per provider request regarding: Please call his dialysis center in [facility] and let them know to rotate the stick sites and avoid the mid fistula where there are skin tears and previous scabs  Message sent to provider.-12/4/2024 Called and spoke with Frank dialysis nurse at [facility] and aware of above. Verbalized understanding and states will make note for pt. chart.

## 2024-12-04 ENCOUNTER — ANESTHESIA EVENT (OUTPATIENT)
Dept: ENDOSCOPY | Facility: HOSPITAL | Age: 69
End: 2024-12-04
Payer: MEDICARE

## 2024-12-04 ENCOUNTER — TELEPHONE (OUTPATIENT)
Dept: ENDOSCOPY | Facility: HOSPITAL | Age: 69
End: 2024-12-04
Payer: MEDICARE

## 2024-12-04 ENCOUNTER — TELEPHONE (OUTPATIENT)
Dept: INTERVENTIONAL RADIOLOGY/VASCULAR | Facility: HOSPITAL | Age: 69
End: 2024-12-04
Payer: MEDICARE

## 2024-12-04 VITALS
TEMPERATURE: 98 F | RESPIRATION RATE: 16 BRPM | DIASTOLIC BLOOD PRESSURE: 59 MMHG | OXYGEN SATURATION: 98 % | HEART RATE: 70 BPM | SYSTOLIC BLOOD PRESSURE: 109 MMHG

## 2024-12-04 RX ORDER — SODIUM CHLORIDE 9 MG/ML
INJECTION, SOLUTION INTRAVENOUS CONTINUOUS
OUTPATIENT
Start: 2024-12-04

## 2024-12-04 RX ORDER — LIDOCAINE HYDROCHLORIDE 10 MG/ML
1 INJECTION, SOLUTION EPIDURAL; INFILTRATION; INTRACAUDAL; PERINEURAL ONCE
OUTPATIENT
Start: 2024-12-04 | End: 2024-12-04

## 2024-12-04 NOTE — TELEPHONE ENCOUNTER
----- Message from Yenni sent at 12/4/2024 12:30 PM CST -----  Regarding: FW: questions about upcoming proc 12/5    ----- Message -----  From: Lilo Smith MA  Sent: 12/4/2024   9:18 AM CST  To: [facility]  Subject: questions about upcoming proc 12/5                 ----- Message -----  From: Forrest Bermudez  Sent: 12/4/2024   9:11 AM CST  To: [facility] Staff  Subject: self                                             Type: Patient Call Back    Who called:self    What is the request in detail:calling to speak to the office regarding what he needs to do for his appt wero, would like a callback     Can the clinic reply by [facility]?callback    Would the patient rather a call back or a response via [facility]? callback    Best call back number:[phone]    Additional Information:

## 2024-12-04 NOTE — H&P
Short Stay Endoscopy History and Physical    PCP - Raciel Ramyond MD     Procedure - EGD  ASA - per anesthesia  Mallampati - per anesthesia  History of Anesthesia problems - no  Family history Anesthesia problems -  no   Plan of anesthesia - General    HPI:  This is a 69 y.o. male here for evaluation of : Follow up of gastric ulcer      ROS:  Constitutional: No fevers, chills, No weight loss  CV: No chest pain  Pulm: No cough, No shortness of breath  Ophtho: No vision changes  GI: see HPI  Derm: No rash    Medical History:  has a past medical history of Allergy, Clotting disorder, Deep vein thrombosis, Diabetes mellitus, type 2, Hypertension, Nuclear sclerosis of both eyes (6/9/2017), Renal disorder, Seizures, Stroke, and Urinary tract infection.    Surgical History:  has a past surgical history that includes Knee surgery; Femoral artery stent; Esophagogastroduodenoscopy (N/A, 8/17/2020); Eye surgery (Bilateral); Cystoscopy w/ retrogrades (Bilateral, 2/18/2021); Fracture surgery; Cataract extraction w/  intraocular lens implant (Right, 10/05/2017); Cataract extraction w/  intraocular lens implant (Left, 10/19/2017); incision and drainage, lower extremity (Right, 4/4/2024); incision and drainage, lower extremity (Right, 4/6/2024); incision and drainage, lower extremity (Right, 4/9/2024); Wound dressing (Right, 4/9/2024); incision and drainage, lower extremity (Right, 5/21/2024); and Esophagogastroduodenoscopy (N/A, 8/21/2024).    Family History: family history includes Cataracts in his mother; Diabetes in his mother; Heart disease in his mother; Hypertension in his mother and sister; No Known Problems in his brother, daughter, father, maternal aunt, maternal grandfather, maternal grandmother, maternal uncle, paternal aunt, paternal grandfather, paternal grandmother, paternal uncle, and another family member.. Otherwise no colon cancer, inflammatory bowel disease, or GI malignancies.    Social History:  reports  that he has never smoked. He has never used smokeless tobacco. He reports that he does not drink alcohol and does not use drugs.    Review of patient's allergies indicates:   Allergen Reactions    Ace inhibitors      Hyperkalemia 8/2018  Other reaction(s): Unknown    Penicillins Hives     Tolerated cefepime and cefdinir previously    Tizanidine      Felt hot       Medications:   No medications prior to admission.       Physical Exam:    Vital Signs: There were no vitals filed for this visit.    Gen: NAD, lying comfortably  HENT: NCAT, oropharynx clear  Eyes: anicteric sclerae, EOMI grossly  Neck: supple, no visible masses/goiter  Cardiac: RRR  Lungs: non-labored breathing  Abd: soft, NT/ND, normoactive BS  Ext: no LE edema, warm, well perfused  Skin: skin intact on exposed body parts, no visible rashes, lesions  Neuro: A&Ox4, neuro exam grossly intact, moves all extremities  Psych: appropriate mood, affect      Labs:  Lab Results   Component Value Date    WBC 5.67 11/27/2024    HGB 12 (L) 12/02/2024    HCT 40.5 11/27/2024     11/27/2024    CHOL 168 11/07/2024    TRIG 53 11/07/2024    HDL 44 11/07/2024    ALT <5 (L) 08/21/2024    AST 5 (L) 08/21/2024     11/27/2024    K 4.3 11/27/2024    CL 98 11/27/2024    CREATININE 6.4 (H) 11/27/2024    BUN 32 (H) 11/27/2024    CO2 23 11/27/2024    TSH 2.672 03/20/2024    PSA 10.2 (H) 11/19/2020    INR 1.0 11/27/2024    HGBA1C 4.9 10/14/2024       Plan:  EGD for follow up of gastric ulcer.    I have explained the risks and benefits of endoscopy procedures to the patient including but not limited to bleeding, perforation, infection, and death.  The patient was asked if they understand and allowed to ask any further questions to their satisfaction.      Tonie Chauhan MD

## 2024-12-05 ENCOUNTER — HOSPITAL ENCOUNTER (OUTPATIENT)
Facility: HOSPITAL | Age: 69
Discharge: HOME | End: 2024-12-05
Attending: INTERNAL MEDICINE | Admitting: INTERNAL MEDICINE
Payer: MEDICARE

## 2024-12-05 ENCOUNTER — ANESTHESIA (OUTPATIENT)
Dept: ENDOSCOPY | Facility: HOSPITAL | Age: 69
End: 2024-12-05
Payer: MEDICARE

## 2024-12-05 ENCOUNTER — DOCUMENT SCAN (OUTPATIENT)
Dept: HOME HEALTH SERVICES | Facility: HOSPITAL | Age: 69
End: 2024-12-05
Payer: MEDICARE

## 2024-12-05 VITALS
TEMPERATURE: 98 F | OXYGEN SATURATION: 99 % | HEART RATE: 78 BPM | DIASTOLIC BLOOD PRESSURE: 56 MMHG | SYSTOLIC BLOOD PRESSURE: 105 MMHG | RESPIRATION RATE: 20 BRPM

## 2024-12-05 DIAGNOSIS — K25.9 GASTRIC ULCER, UNSPECIFIED CHRONICITY, UNSPECIFIED WHETHER GASTRIC ULCER HEMORRHAGE OR PERFORATION PRESENT: Primary | ICD-10-CM

## 2024-12-05 DIAGNOSIS — K25.9 GASTRIC ULCER: ICD-10-CM

## 2024-12-05 DIAGNOSIS — K92.2 UPPER GI BLEED: ICD-10-CM

## 2024-12-05 LAB — POCT GLUCOSE: 122 MG/DL (ref 70–110)

## 2024-12-05 PROCEDURE — 37000008 HC ANESTHESIA 1ST 15 MINUTES: Mod: HCNC | Performed by: INTERNAL MEDICINE

## 2024-12-05 PROCEDURE — 63600175 PHARM REV CODE 636 W HCPCS: Mod: HCNC

## 2024-12-05 PROCEDURE — D9220A PRA ANESTHESIA: Mod: ANES,,, | Performed by: STUDENT IN AN ORGANIZED HEALTH CARE EDUCATION/TRAINING PROGRAM

## 2024-12-05 PROCEDURE — 82962 GLUCOSE BLOOD TEST: CPT | Mod: HCNC | Performed by: INTERNAL MEDICINE

## 2024-12-05 PROCEDURE — 25000003 PHARM REV CODE 250: Mod: HCNC

## 2024-12-05 PROCEDURE — 43235 EGD DIAGNOSTIC BRUSH WASH: CPT | Mod: 53,HCNC,, | Performed by: INTERNAL MEDICINE

## 2024-12-05 PROCEDURE — 43235 EGD DIAGNOSTIC BRUSH WASH: CPT | Mod: 74,HCNC | Performed by: INTERNAL MEDICINE

## 2024-12-05 PROCEDURE — 37000009 HC ANESTHESIA EA ADD 15 MINS: Mod: HCNC | Performed by: INTERNAL MEDICINE

## 2024-12-05 PROCEDURE — D9220A PRA ANESTHESIA: Mod: CRNA,,,

## 2024-12-05 RX ORDER — PHENYLEPHRINE HYDROCHLORIDE 10 MG/ML
INJECTION INTRAVENOUS
Status: DISCONTINUED | OUTPATIENT
Start: 2024-12-05 | End: 2024-12-05

## 2024-12-05 RX ORDER — DEXMEDETOMIDINE HYDROCHLORIDE 100 UG/ML
INJECTION, SOLUTION INTRAVENOUS
Status: DISCONTINUED | OUTPATIENT
Start: 2024-12-05 | End: 2024-12-05

## 2024-12-05 RX ORDER — LIDOCAINE HYDROCHLORIDE 20 MG/ML
INJECTION INTRAVENOUS
Status: DISCONTINUED | OUTPATIENT
Start: 2024-12-05 | End: 2024-12-05

## 2024-12-05 RX ORDER — PHENYLEPHRINE HCL IN 0.9% NACL 1 MG/10 ML
SYRINGE (ML) INTRAVENOUS
Status: DISCONTINUED
Start: 2024-12-05 | End: 2024-12-05 | Stop reason: HOSPADM

## 2024-12-05 RX ORDER — PROPOFOL 10 MG/ML
VIAL (ML) INTRAVENOUS
Status: DISCONTINUED
Start: 2024-12-05 | End: 2024-12-05 | Stop reason: HOSPADM

## 2024-12-05 RX ORDER — PROPOFOL 10 MG/ML
VIAL (ML) INTRAVENOUS
Status: DISCONTINUED | OUTPATIENT
Start: 2024-12-05 | End: 2024-12-05

## 2024-12-05 RX ORDER — LIDOCAINE HYDROCHLORIDE 20 MG/ML
INJECTION, SOLUTION EPIDURAL; INFILTRATION; INTRACAUDAL; PERINEURAL
Status: DISCONTINUED
Start: 2024-12-05 | End: 2024-12-05 | Stop reason: HOSPADM

## 2024-12-05 RX ORDER — GLYCOPYRROLATE 0.2 MG/ML
INJECTION INTRAMUSCULAR; INTRAVENOUS
Status: DISCONTINUED
Start: 2024-12-05 | End: 2024-12-05 | Stop reason: HOSPADM

## 2024-12-05 RX ADMIN — DEXMEDETOMIDINE HYDROCHLORIDE 8 MCG: 100 INJECTION, SOLUTION INTRAVENOUS at 11:12

## 2024-12-05 RX ADMIN — GLYCOPYRROLATE 0.1 MG: 0.2 INJECTION, SOLUTION INTRAMUSCULAR; INTRAVITREAL at 11:12

## 2024-12-05 RX ADMIN — PHENYLEPHRINE HYDROCHLORIDE 100 MCG: 10 INJECTION INTRAVENOUS at 11:12

## 2024-12-05 RX ADMIN — PROPOFOL 80 MG: 10 INJECTION, EMULSION INTRAVENOUS at 11:12

## 2024-12-05 RX ADMIN — SODIUM CHLORIDE: 0.9 INJECTION, SOLUTION INTRAVENOUS at 11:12

## 2024-12-05 RX ADMIN — LIDOCAINE HYDROCHLORIDE 100 MG: 20 INJECTION, SOLUTION INTRAVENOUS at 11:12

## 2024-12-05 NOTE — ANESTHESIA PREPROCEDURE EVALUATION
[facility]  Anesthesia Pre-Operative Evaluation         Patient Name: Miguel Moore  YOB: 1955  MRN: [id number]    SUBJECTIVE:     Pre-operative evaluation for Procedure(s) (LRB):  EGD (ESOPHAGOGASTRODUODENOSCOPY) (N/A)     12/05/2024    Miguel Moore is a 69 y.o. male w/ a significant PMHx of HTN, HLD, seizures, CVA with right sided hemiparesis, ESRD, T2DM, afib.    Patient now presents for the above procedure(s).      LDA: None documented.    Prev airway: None documented.    Drips: None documented.    Patient Active Problem List   Diagnosis    Benign essential HTN    Dyslipidemia    BPH (benign prostatic hyperplasia) 2/18/21 prostate bx normal    Seizure disorder as sequela of cerebrovascular accident    Hemiplegia and hemiparesis following cerebral infarction affecting right dominant side    Microcytosis 9/2019 labs c/w AoCD and AoCKD    ESRD (end stage renal disease)    Nuclear sclerosis, left    Cortical cataract of both eyes    DM type 2 without retinopathy    Secondary hyperparathyroidism of renal origin    Vitamin D deficiency    Right sided weakness    Senile nuclear sclerosis    CME (cystoid macular edema), bilateral    Refractive error    History of fungal infection candida parasilosis hospitalization 8/2020    Chronic deep vein thrombosis (DVT) of popliteal vein of right lower extremity 9/21/20 outside [place]; unprovoked; anticoagulation    Pulmonary nodule 1/2021 4 mm nodule.    Elevated PSA 2/18/21 prostate bx normal    Anemia in chronic kidney disease    History of diabetic ulcer of foot    Pseudophakia    Bilateral posterior capsular opacification    Paroxysmal atrial fibrillation    Peripheral artery disease 5/16/24 LE angio with angioplasty popliteal artery    Abdominal aortic atherosclerosis on CT 4/1/24    Upper GI bleed 8/23/24 coil embolization L gastric artery    Wounds, multiple    Acute blood loss anemia (ABLA)       Review of patient's allergies indicates:    Allergen Reactions    Ace inhibitors      Hyperkalemia 8/2018  Other reaction(s): Unknown    Penicillins Hives     Tolerated cefepime and cefdinir previously    Tizanidine      Felt hot       Current Outpatient Medications:  No current facility-administered medications for this encounter.    Past Surgical History:   Procedure Laterality Date    CATARACT EXTRACTION W/  INTRAOCULAR LENS IMPLANT Right 10/05/2017    Dr. Tay    CATARACT EXTRACTION W/  INTRAOCULAR LENS IMPLANT Left 10/19/2017    Dr. Tay    CYSTOSCOPY W/ RETROGRADES Bilateral 2/18/2021    Procedure: CYSTOSCOPY, WITH RETROGRADE PYELOGRAM;  Surgeon: JEMMA Styles MD;  Location: [facility] OR;  Service: Urology;  Laterality: Bilateral;  REQUESTING EARLY AS POSSIBE-LO / 2/8/2021 @ 1:13PM  RN Pre Op 2-11-21, Covid NEGATIVE ON  2-17-21.  C A    ESOPHAGOGASTRODUODENOSCOPY N/A 8/17/2020    Procedure: EGD (ESOPHAGOGASTRODUODENOSCOPY);  Surgeon: Desmond Chapman MD;  Location: [facility];  Service: Endoscopy;  Laterality: N/A;    ESOPHAGOGASTRODUODENOSCOPY N/A 8/21/2024    Procedure: EGD (ESOPHAGOGASTRODUODENOSCOPY);  Surgeon: Tonie Chauhan MD;  Location: [facility];  Service: Endoscopy;  Laterality: N/A;    EYE SURGERY Bilateral     cataract    FEMORAL ARTERY STENT      FRACTURE SURGERY      INCISION AND DRAINAGE, LOWER EXTREMITY Right 4/4/2024    Procedure: INCISION AND DRAINAGE, LOWER EXTREMITY;  Surgeon: Jimbo Montilla III, MD;  Location: [facility] OR;  Service: Orthopedics;  Laterality: Right;    INCISION AND DRAINAGE, LOWER EXTREMITY Right 4/6/2024    Procedure: INCISION AND DRAINAGE, LOWER EXTREMITY;  Surgeon: Desmond Barillas MD;  Location: [facility] OR;  Service: Orthopedics;  Laterality: Right;  foot and ankle    INCISION AND DRAINAGE, LOWER EXTREMITY Right 4/9/2024    Procedure: INCISION AND DRAINAGE, LOWER EXTREMITY- FOOT & ANKLE;  Surgeon: Jimbo Montilla III, MD;  Location: [facility] OR;  Service: Orthopedics;  Laterality: Right;  CULTURES,  VASCHE    INCISION AND DRAINAGE, LOWER EXTREMITY Right 5/21/2024    Procedure: INCISION AND DRAINAGE, LOWER EXTREMITY;  Surgeon: Jimbo Montilla III, MD;  Location: [facility] OR;  Service: Orthopedics;  Laterality: Right;  Pulsavac and Vashe    KNEE SURGERY      bilateral scope    WOUND DRESSING Right 4/9/2024    Procedure: WOUND VAC EXCHANGE;  Surgeon: Jimbo Montilla III, MD;  Location: [facility] OR;  Service: Orthopedics;  Laterality: Right;       Social History     Socioeconomic History    Marital status: Single   Occupational History    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, etc   Tobacco Use    Smoking status: Never    Smokeless tobacco: Never   Substance and Sexual Activity    Alcohol use: No    Drug use: No   Social History Narrative    Lives with daughter     Social Drivers of Health     Financial Resource Strain: Medium Risk (9/10/2024)    Overall Financial Resource Strain (CARDIA)     Difficulty of Paying Living Expenses: Somewhat hard   Food Insecurity: Food Insecurity Present (9/10/2024)    Hunger Vital Sign     Worried About Running Out of Food in the Last Year: Sometimes true     Ran Out of Food in the Last Year: Never true   Transportation Needs: Unmet Transportation Needs (9/10/2024)    PRAPARE - Transportation     Lack of Transportation (Medical): Yes     Lack of Transportation (Non-Medical): No   Physical Activity: Insufficiently Active (9/10/2024)    Exercise Vital Sign     Days of Exercise per Week: 4 days     Minutes of Exercise per Session: 10 min   Stress: No Stress Concern Present (9/10/2024)    [employer] - Occupational Stress Questionnaire     Feeling of Stress : Not at all   Housing Stability: Low Risk  (9/10/2024)    Housing Stability Vital Sign     Unable to Pay for Housing in the Last Year: No     Homeless in the Last Year: No       OBJECTIVE:     Vital Signs Range (Last 24H):         Significant Labs:  Lab Results   Component Value Date    WBC  5.67 11/27/2024    HGB 12 (L) 12/02/2024    HCT 40.5 11/27/2024     11/27/2024    CHOL 168 11/07/2024    TRIG 53 11/07/2024    HDL 44 11/07/2024    ALT <5 (L) 08/21/2024    AST 5 (L) 08/21/2024     11/27/2024    K 4.7 12/05/2024    CL 98 11/27/2024    CREATININE 6.4 (H) 11/27/2024    BUN 32 (H) 11/27/2024    CO2 23 11/27/2024    TSH 2.672 03/20/2024    PSA 10.2 (H) 11/19/2020    INR 1.0 11/27/2024    HGBA1C 4.9 10/14/2024       Diagnostic Studies: No relevant studies.    EKG:   Results for orders placed or performed during the hospital encounter of 08/21/24   EKG 12-lead    Collection Time: 08/21/24  9:41 AM   Result Value Ref Range    QRS Duration 76 ms    [facility] QTC Calculation 468 ms    Narrative    Test Reason : K92.2,    Vent. Rate : 104 BPM     Atrial Rate : 104 BPM     P-R Int : 126 ms          QRS Dur : 076 ms      QT Int : 356 ms       P-R-T Axes : 023 042 051 degrees     QTc Int : 468 ms    Sinus tachycardia  Nonspecific T wave abnormality  Abnormal ECG  When compared with ECG of 21-MAR-2024 03:41,  No significant change was found  Confirmed by Ludy Munoz MD ([id number]) on 8/21/2024 9:03:30 PM    Referred By: [email or web address]   SELF           Confirmed By:Ludy Munoz MD       2D ECHO:  TTE:  Results for orders placed or performed during the hospital encounter of 05/10/24   Echo   Result Value Ref Range    BSA 2.08 m2    LVOT stroke volume 79.58 cm3    LVIDd 5.12 3.5 - 6.0 cm    LV Systolic Volume 53.12 mL    LV Systolic Volume Index 27.8 mL/m2    LVIDs 3.56 2.1 - 4.0 cm    LV Diastolic Volume 124.73 mL    LV Diastolic Volume Index 65.30 mL/m2    IVS 1.13 (A) 0.6 - 1.1 cm    LVOT diameter 1.99 cm    LVOT area 3.1 cm2    FS 30 28 - 44 %    Left Ventricle Relative Wall Thickness 0.45 cm    PW 1.16 (A) 0.6 - 1.1 cm    LV mass 227.45 g    LV Mass Index 119 g/m2    MV Peak E Shravan 0.79 m/s    TDI LATERAL 0.12 m/s    TDI SEPTAL 0.10 m/s    E/E' ratio 7.18 m/s    MV Peak A Shravan 0.93 m/s    TR Max Shravan 2.13 m/s     E/A ratio 0.85     IVRT 77.07 msec    E wave deceleration time 152.70 msec    LV SEPTAL E/E' RATIO 7.90 m/s    LV LATERAL E/E' RATIO 6.58 m/s    LVOT peak ellen 1.04 m/s    Left Ventricular Outflow Tract Mean Velocity 0.68 cm/s    Left Ventricular Outflow Tract Mean Gradient 2.19 mmHg    RVDD 3.82 cm    RV S' 20.64 cm/s    TAPSE 2.70 cm    RV/LV Ratio 0.75 cm    LA size 4.12 cm    Left Atrium Minor Axis 5.67 cm    Left Atrium Major Axis 6.52 cm    RA Major Axis 5.67 cm    AV mean gradient 5 mmHg    AV peak gradient 9 mmHg    Ao peak ellen 1.48 m/s    Ao VTI 31.60 cm    LVOT peak VTI 25.60 cm    AV valve area 2.52 cm²    AV Velocity Ratio 0.70     AV index (prosthetic) 0.81     GENO by Velocity Ratio 2.18 cm²    MV mean gradient 2 mmHg    MV peak gradient 5 mmHg    MV stenosis pressure 1/2 time 44.28 ms    MV valve area p 1/2 method 4.97 cm2    MV valve area by continuity eq 3.34 cm2    MV VTI 23.8 cm    Triscuspid Valve Regurgitation Peak Gradient 18 mmHg    PV PEAK VELOCITY 1.01 m/s    PV peak gradient 4 mmHg    Sinus 3.51 cm    STJ 2.66 cm    Ascending aorta 3.09 cm    IVC diameter 1.33 cm    Mean e' 0.11 m/s    ZLVIDS 0.57     ZLVIDD -0.48     GLENN 57.8 mL/m2    LA Vol 110.45 cm3    LA WIDTH 5.2 cm    RA Width 4.1 cm    TV resting pulmonary artery pressure 21 mmHg    RV TB RVSP 5 mmHg    Est. RA pres 3 mmHg    Narrative      Left Ventricle: The left ventricle is normal in size. There is mild   concentric hypertrophy. There is normal systolic function with a visually   estimated ejection fraction of 55 - 60%. Grade I diastolic dysfunction.    Right Ventricle: Normal right ventricular cavity size. Systolic   function is normal.    Left Atrium: Left atrium is mildly dilated.    Aortic Valve: There is mild aortic valve sclerosis.    Pulmonary Artery: The estimated pulmonary artery systolic pressure is   21 mmHg.    IVC/SVC: Normal venous pressure at 3 mmHg.         SONY:  No results found. However, due to the size of the  patient record, not all encounters were searched. Please check Results Review for a complete set of results.    ASSESSMENT/PLAN:           Pre-op Assessment    I have reviewed the Patient Summary Reports.     I have reviewed the Nursing Notes. I have reviewed the NPO Status.   I have reviewed the Medications.     Review of Systems  Anesthesia Hx:  No problems with previous Anesthesia             Denies Family Hx of Anesthesia complications.    Denies Personal Hx of Anesthesia complications.                    Social:  Non-Smoker, Social Alcohol Use       Cardiovascular:     Hypertension                                          Pulmonary:  Pulmonary Normal                       Renal/:  Chronic Renal Disease, ESRD                Hepatic/GI:  Hepatic/GI Normal                    Neurological:   CVA (right sided hemiparesis), residual symptoms    Seizures                                Endocrine:  Diabetes, type 2               Physical Exam  General: Well nourished, Cooperative, Alert and Oriented    Dental:  Loose teeth, Periodontal disease  Multiple missing upper and lower teeth. Multiple loose teeth  Chest/Lungs:  Normal Respiratory Rate    Heart:  Rate: Normal  Rhythm: Regular Rhythm    Musculoskeletal:  Right sided hemiparesis, right upper extremity contracture      Anesthesia Plan  Type of Anesthesia, risks & benefits discussed:    Anesthesia Type: MAC, Gen Natural Airway  Intra-op Monitoring Plan: Standard ASA Monitors  Post Op Pain Control Plan: multimodal analgesia  Induction:  IV  Informed Consent: Informed consent signed with the Patient and all parties understand the risks and agree with anesthesia plan.  All questions answered. Patient consented to blood products? No  ASA Score: 3    Ready For Surgery From Anesthesia Perspective.     .

## 2024-12-05 NOTE — TRANSFER OF CARE
Anesthesia Transfer of Care Note    Patient: Miguel Moore    Procedure(s) Performed: Procedure(s) (LRB):  EGD (ESOPHAGOGASTRODUODENOSCOPY) (N/A)    Patient location: GI    Anesthesia Type: general    Transport from OR: Transported from OR on room air with adequate spontaneous ventilation    Post pain: adequate analgesia    Post assessment: no apparent anesthetic complications and tolerated procedure well    Post vital signs: stable    Level of consciousness: responds to stimulation and lethargic    Nausea/Vomiting: no nausea/vomiting    Complications: none    Transfer of care protocol was followed      Last vitals: Visit Vitals  /60 (BP Location: Right arm, Patient Position: Lying)   Pulse 69   Temp 36.5 °C (97.7 °F) (Axillary)   Resp 14   SpO2 100%

## 2024-12-05 NOTE — OR NURSING
PROCEDURE AND RECOVERY COMPLETED. DR. HERNDON DISCUSSED FINDINGS WITH PATIENT AND S.O; DISCHARGE INSTRUCTIONS GIVEN AND UNDERSTANDING VERBALIZED; ASSISTED UP WITHOUT UNTOWARD EFFECTS; PATIENT READY DISCHARGE

## 2024-12-05 NOTE — PROVATION PATIENT INSTRUCTIONS
Discharge Summary/Instructions after an Endoscopic Procedure  Patient Name: Miguel Moore  Patient MRN: [id number]  Patient YOB: 1955  Thursday, December 5, 2024  Tonie Chauhan MD  Dear patient,  As a result of recent federal legislation (The Federal Cures Act), you may   receive lab or pathology results from your procedure in your [facility]   account before your physician is able to contact you. Your physician or   their representative will relay the results to you with their   recommendations at their soonest availability.  Thank you,  RESTRICTIONS:  During your procedure today, you received medications for sedation.  These   medications may affect your judgment, balance and coordination.  Therefore,   for 24 hours, you have the following restrictions:   - DO NOT drive a car, operate machinery, make legal/financial decisions,   sign important papers or drink alcohol.    ACTIVITY:  Today: no heavy lifting, straining or running due to procedural   sedation/anesthesia.  The following day: return to full activity including work.  DIET:  Eat and drink normally unless instructed otherwise.     TREATMENT FOR COMMON SIDE EFFECTS:  - Mild abdominal pain, nausea, belching, bloating or excessive gas:  rest,   eat lightly and use a heating pad.  - Sore Throat: treat with throat lozenges and/or gargle with warm salt   water.  - Because air was used during the procedure, expelling large amounts of air   from your rectum or belching is normal.  - If a bowel prep was taken, you may not have a bowel movement for 1-3 days.    This is normal.  SYMPTOMS TO WATCH FOR AND REPORT TO YOUR PHYSICIAN:  1. Abdominal pain or bloating, other than gas cramps.  2. Chest pain.  3. Back pain.  4. Signs of infection such as: chills or fever occurring within 24 hours   after the procedure.  5. Rectal bleeding, which would show as bright red, maroon, or black stools.   (A tablespoon of blood from the rectum is not serious,  especially if   hemorrhoids are present.)  6. Vomiting.  7. Weakness or dizziness.  GO DIRECTLY TO THE NEAREST EMERGENCY ROOM IF YOU HAVE ANY OF THE FOLLOWING:      Difficulty breathing              Chills and/or fever over 101 F   Persistent vomiting and/or vomiting blood   Severe abdominal pain   Severe chest pain   Black, tarry stools   Bleeding- more than one tablespoon   Any other symptom or condition that you feel may need urgent attention  Your doctor recommends these additional instructions:  If any biopsies were taken, your doctors clinic will contact you in 1 to 2   weeks with any results.  - Discharge patient to home.   - Resume previous diet.   - Continue present medications.   - Repeat upper endoscopy at appointment to be scheduled to check healing of   his gastric ulcer.  For questions, problems or results please call your physician - Tonie Chauhan MD at Work:  [phone].  [facility] can be reached at [phone]     IF A COMPLICATION OR EMERGENCY SITUATION ARISES AND YOU ARE UNABLE TO REACH   YOUR PHYSICIAN - GO DIRECTLY TO THE EMERGENCY ROOM.  Tonie Chauhan MD  12/5/2024 11:51:27 AM  This report has been verified and signed electronically.  Dear patient,  As a result of recent federal legislation (The Federal Cures Act), you may   receive lab or pathology results from your procedure in your [facility]   account before your physician is able to contact you. Your physician or   their representative will relay the results to you with their   recommendations at their soonest availability.  Thank you,  [employer]ATION

## 2024-12-07 NOTE — ANESTHESIA POSTPROCEDURE EVALUATION
Anesthesia Post Evaluation    Patient: Miguel Moore    Procedure(s) Performed: Procedure(s) (LRB):  EGD (ESOPHAGOGASTRODUODENOSCOPY) (N/A)    Final Anesthesia Type: general      Patient location during evaluation: GI PACU  Patient participation: Yes- Able to Participate  Level of consciousness: awake and alert and oriented  Post-procedure vital signs: reviewed and stable  Pain management: adequate  Airway patency: patent    PONV status at discharge: No PONV  Anesthetic complications: no      Cardiovascular status: blood pressure returned to baseline and hemodynamically stable  Respiratory status: unassisted, spontaneous ventilation and room air  Hydration status: euvolemic  Follow-up not needed.              Vitals Value Taken Time   /56 12/05/24 1245   Temp 36.5 °C (97.7 °F) 12/05/24 1200   Pulse 78 12/05/24 1245   Resp 20 12/05/24 1245   SpO2 99 % 12/05/24 1245         Event Time   Out of Recovery 12:40:00         Pain/Skyler Score: No data recorded

## 2024-12-09 ENCOUNTER — TELEPHONE (OUTPATIENT)
Dept: VASCULAR SURGERY | Facility: CLINIC | Age: 69
End: 2024-12-09
Payer: MEDICARE

## 2024-12-09 ENCOUNTER — DOCUMENT SCAN (OUTPATIENT)
Dept: HOME HEALTH SERVICES | Facility: HOSPITAL | Age: 69
End: 2024-12-09
Payer: MEDICARE

## 2024-12-09 ENCOUNTER — TELEPHONE (OUTPATIENT)
Dept: UROLOGY | Facility: CLINIC | Age: 69
End: 2024-12-09
Payer: MEDICARE

## 2024-12-09 NOTE — TELEPHONE ENCOUNTER
----- Message from [facility] sent at 12/9/2024  9:12 AM CST -----  Regarding: self  Type: Patient Call Back    Who called:self    What is the request in detail:pt is calling tos peak to the office regarding the appts he have tomorrow, stated he wanted to get them rescheduled   Will not be able to make it tomorrow   Can the clinic reply by [facility]?    Would the patient rather a call back or a response via [facility]?     Best call back number:[phone]    Additional Information:

## 2024-12-09 NOTE — TELEPHONE ENCOUNTER
Pt was contacted. Pt states he does not have transportation. Appt r/s.  ----- Message from [facility] sent at 12/9/2024  9:14 AM CST -----  Regarding: self  Type: Patient Call Back    Who called:self    What is the request in detail:calling to state he will not be able to make it for his procedure would like a callback to get rescheduled     Can the clinic reply by [facility]?callback    Would the patient rather a call back or a response via [facility]? callback    Best call back number [phone]  Additional Information:

## 2024-12-10 ENCOUNTER — OUTPATIENT CASE MANAGEMENT (OUTPATIENT)
Dept: ADMINISTRATIVE | Facility: OTHER | Age: 69
End: 2024-12-10
Payer: MEDICARE

## 2024-12-10 NOTE — PROGRESS NOTES
Outpatient Care Management  Plan of Care Follow Up Visit    Patient: Miguel Moore  MRN: [id number]  Date of Service: 12/10/2024  Completed by: Maira Jacobs RN  Referral Date: 08/31/2024    No chief complaint on file.      Brief Summary: Pt reports that he is doing well at this time with his bp. Informed pt of case graduation. Informed pt that he can contact this CM with his concerns.

## 2024-12-19 ENCOUNTER — TELEPHONE (OUTPATIENT)
Dept: UROLOGY | Facility: CLINIC | Age: 69
End: 2024-12-19
Payer: MEDICARE

## 2024-12-19 NOTE — TELEPHONE ENCOUNTER
He is already on Flomax 0.8 mg and Finasteride.  I do not have any more medication to give regarding his prostate and stream.    He needs a cystoscopy to see if he would be a good candidate for a prostate procedure.

## 2024-12-19 NOTE — TELEPHONE ENCOUNTER
----- Message from Med Assistant Roe sent at 12/19/2024  3:59 PM CST -----  Regarding: FW: patient call back    ----- Message -----  From: Heydi Adams  Sent: 12/19/2024   3:41 PM CST  To: [facility] Staff  Subject: patient call back                                Type: Patient Call Back    Who called: Self     What is the request in detail: asked for a call back about weak stream and asked for a Rx for it. He said that he got something for it before   [facility] - [place], [place] - [address]    Can the clinic reply by [facility]? No     Would the patient rather a call back or a response via [facility]? Call     Best call back number: .[phone]

## 2024-12-20 ENCOUNTER — TELEPHONE (OUTPATIENT)
Dept: UROLOGY | Facility: CLINIC | Age: 69
End: 2024-12-20
Payer: MEDICARE

## 2024-12-20 NOTE — TELEPHONE ENCOUNTER
Pt was contacted pt was informed he has refills at the pharmacy and should reach out to them to get refills. Pt also advised to keep his procedure appt.   ----- Message from Rosy sent at 12/20/2024  8:03 AM CST -----  .Type: Patient Call Back    Who called: self     What is the request in detail:calling for a refill on medication to help him urinate. Ask that the nurse give him a call    Can the clinic reply by [facility]? No     Would the patient rather a call back or a response via [facility]? Call back     Best call back number: .[phone] (home)       Additional Information:

## 2024-12-23 ENCOUNTER — TELEPHONE (OUTPATIENT)
Dept: UROLOGY | Facility: CLINIC | Age: 69
End: 2024-12-23
Payer: MEDICARE

## 2024-12-30 ENCOUNTER — TELEPHONE (OUTPATIENT)
Dept: FAMILY MEDICINE | Facility: CLINIC | Age: 69
End: 2024-12-30
Payer: MEDICARE

## 2024-12-30 DIAGNOSIS — L60.9 NAIL ABNORMALITIES: Primary | ICD-10-CM

## 2024-12-30 PROCEDURE — G0179 MD RECERTIFICATION HHA PT: HCPCS | Mod: ,,, | Performed by: INTERNAL MEDICINE

## 2024-12-30 NOTE — TELEPHONE ENCOUNTER
Spoke with patient.Referral for dermatology placed.  Patient verbalized understanding. Patient has no other questions or concerns at this time.

## 2024-12-30 NOTE — TELEPHONE ENCOUNTER
----- Message from Tech Saranya sent at 12/30/2024 12:13 PM CST -----  Regarding: Return call  .Type:  Patient Returning Call    Who Called: self    Who Left Message for Patient: Earnest Tong MA    Does the patient know what this is regarding?:yes    Would the patient rather a call back or a response via [facility]? Call     Best Call Back Number:.[phone]      Additional Information:

## 2024-12-30 NOTE — TELEPHONE ENCOUNTER
----- Message from [employer] sent at 12/30/2024  8:46 AM CST -----  Who Called:self    Referral to What Specialty: Orthopedic ?/ finger nail     Reason for Referral: nail ripped off not healing     Does the patient want the referral with a specific physician?:    Is the specialist [facility]    Would the patient rather a call back or a response via [facility]?call    Best Call Back Number:.[phone]    Additional Information:

## 2025-01-13 ENCOUNTER — DOCUMENT SCAN (OUTPATIENT)
Dept: HOME HEALTH SERVICES | Facility: HOSPITAL | Age: 70
End: 2025-01-13
Payer: MEDICARE

## 2025-01-14 ENCOUNTER — OFFICE VISIT (OUTPATIENT)
Dept: PODIATRY | Facility: CLINIC | Age: 70
End: 2025-01-14
Payer: MEDICARE

## 2025-01-14 VITALS — WEIGHT: 164 LBS | BODY MASS INDEX: 24.86 KG/M2 | HEIGHT: 68 IN

## 2025-01-14 DIAGNOSIS — N18.6 CONTROLLED TYPE 2 DIABETES MELLITUS WITH CHRONIC KIDNEY DISEASE ON CHRONIC DIALYSIS, WITH LONG-TERM CURRENT USE OF INSULIN: Primary | ICD-10-CM

## 2025-01-14 DIAGNOSIS — Z00.00 ENCOUNTER FOR MEDICARE ANNUAL WELLNESS EXAM: ICD-10-CM

## 2025-01-14 DIAGNOSIS — Z79.4 CONTROLLED TYPE 2 DIABETES MELLITUS WITH CHRONIC KIDNEY DISEASE ON CHRONIC DIALYSIS, WITH LONG-TERM CURRENT USE OF INSULIN: Primary | ICD-10-CM

## 2025-01-14 DIAGNOSIS — B35.1 ONYCHOMYCOSIS DUE TO DERMATOPHYTE: ICD-10-CM

## 2025-01-14 DIAGNOSIS — E11.22 CONTROLLED TYPE 2 DIABETES MELLITUS WITH CHRONIC KIDNEY DISEASE ON CHRONIC DIALYSIS, WITH LONG-TERM CURRENT USE OF INSULIN: Primary | ICD-10-CM

## 2025-01-14 DIAGNOSIS — Z99.2 CONTROLLED TYPE 2 DIABETES MELLITUS WITH CHRONIC KIDNEY DISEASE ON CHRONIC DIALYSIS, WITH LONG-TERM CURRENT USE OF INSULIN: Primary | ICD-10-CM

## 2025-01-14 PROCEDURE — 99999 PR PBB SHADOW E&M-EST. PATIENT-LVL III: CPT | Mod: PBBFAC,HCNC,, | Performed by: PODIATRIST

## 2025-01-14 PROCEDURE — 11721 DEBRIDE NAIL 6 OR MORE: CPT | Mod: Q9,HCNC,S$GLB, | Performed by: PODIATRIST

## 2025-01-14 PROCEDURE — 99499 UNLISTED E&M SERVICE: CPT | Mod: HCNC,S$GLB,, | Performed by: PODIATRIST

## 2025-01-14 NOTE — PROGRESS NOTES
Subjective:     Patient ID: Miguel Moore is a 69 y.o. male.    Chief Complaint: Diabetes Mellitus (11/7/24 Dr Raymond) and Nail Care    Miguel is a 69 y.o. male who presents to the clinic for evaluation and treatment of high risk feet. Miguel has a past medical history of Allergy, Clotting disorder, Deep vein thrombosis, Diabetes mellitus, type 2, Hypertension, Nuclear sclerosis of both eyes (06/09/2017), Renal disorder, Seizures, Stroke, and Urinary tract infection. The patient's chief complaint is long, thick toenails. This patient has documented high risk feet requiring routine maintenance secondary to peripheral neuropathy.    PCP: Raciel Raymond MD    Date Last Seen by PCP: per above    Current shoe gear:  Affected Foot: Tennis shoes     Unaffected Foot: Tennis shoes    Hemoglobin A1C   Date Value Ref Range Status   12/09/2024 5.8 4.8 - 5.9 % Final   10/14/2024 4.9 4.8 - 5.9 % Final   09/09/2024 5 4.8 - 5.9 % Final   03/20/2024 5.8 (H) 4.0 - 5.6 % Final     Comment:     ADA Screening Guidelines:  5.7-6.4%  Consistent with prediabetes  >or=6.5%  Consistent with diabetes    High levels of fetal hemoglobin interfere with the HbA1C  assay. Heterozygous hemoglobin variants (HbS, HgC, etc)do  not significantly interfere with this assay.   However, presence of multiple variants may affect accuracy.     10/26/2023 5.9 (H) 4.0 - 5.6 % Final     Comment:     ADA Screening Guidelines:  5.7-6.4%  Consistent with prediabetes  >or=6.5%  Consistent with diabetes    High levels of fetal hemoglobin interfere with the HbA1C  assay. Heterozygous hemoglobin variants (HbS, HgC, etc)do  not significantly interfere with this assay.   However, presence of multiple variants may affect accuracy.     04/27/2023 5.9 (H) 4.0 - 5.6 % Final     Comment:     ADA Screening Guidelines:  5.7-6.4%  Consistent with prediabetes  >or=6.5%  Consistent with diabetes    High levels of fetal hemoglobin interfere with the HbA1C  assay. Heterozygous  hemoglobin variants (HbS, HgC, etc)do  not significantly interfere with this assay.   However, presence of multiple variants may affect accuracy.         Review of Systems   Constitutional: Negative for chills.   Cardiovascular:  Negative for chest pain and claudication.   Respiratory:  Negative for cough.    Skin:  Positive for color change, dry skin and nail changes.   Musculoskeletal:  Positive for joint pain.   Gastrointestinal:  Negative for nausea.   Neurological:  Positive for paresthesias. Negative for numbness.   Psychiatric/Behavioral:  The patient is not nervous/anxious.         Objective:     Physical Exam  Constitutional:       Appearance: He is well-developed.   Cardiovascular:      Comments: Dorsalis pedis and posterior tibial pulses are diminished Bilaterally. Toes are cool to touch. Feet are warm proximally.There is decreased digital hair. Skin is atrophic, slightly hyperpigmented, and mildly edematous   Pulmonary:      Effort: No respiratory distress.   Musculoskeletal:         General: Tenderness present.      Comments: Adequate joint range of motion without pain, limitation, nor crepitation Bilateral feet and ankle joints. Muscle strength is 5/5 in all groups bilaterally.   Decreased stride, station of gait.  apropulsive toe off.  Increased angle and base of gait.      Patient has hammertoes of digits 2-5 bilateral partially reducible without symptom today.     Visible and palpable bunion without pain at dorsomedial 1st metatarsal head right and left.  Hallux abducted right and left partially reducible, tracks laterally without being track bound.  No ecchymosis, erythema, edema, or cardinal signs infection or signs of trauma same foot.         Feet:      Right foot:      Skin integrity: Callus and dry skin present. No ulcer or skin breakdown.      Left foot:      Skin integrity: Dry skin present. No ulcer.   Skin:     General: Skin is dry.      Findings: No erythema.      Comments: Nails x10 are  elongated by 2-6mm's, thickened by 3-5 mm's, dystrophic, and are darkened in coloration . Xerosis Bilaterally. No open lesions noted   Focal hyperkeratotic lesion consisting entirely of hyperkeratotic tissue without open skin, drainage, pus, fluctuance, malodor, or signs of infection: right heel, right great toe     Neurological:      Mental Status: He is alert.      Comments: [place]-Alex 5.07 monofilamant testing is diminished Desmond feet. Sharp/dull sensation diminished Bilaterally. Light touch absent Bilaterally.            Assessment:      Encounter Diagnoses   Name Primary?    Controlled type 2 diabetes mellitus with chronic kidney disease on chronic dialysis, with long-term current use of insulin Yes    Onychomycosis due to dermatophyte            Plan:     Miguel was seen today for diabetes mellitus and nail care.    Diagnoses and all orders for this visit:    Controlled type 2 diabetes mellitus with chronic kidney disease on chronic dialysis, with long-term current use of insulin    Onychomycosis due to dermatophyte            I counseled the patient on his conditions, their implications and medical management.    - Shoe inspection. Diabetic Foot Education. Patient reminded of the importance of good nutrition and blood sugar control to help prevent podiatric complications of diabetes. Patient instructed on proper foot hygeine. We discussed wearing proper shoe gear, daily foot inspections, never walking without protective shoe gear, never putting sharp instruments to feet, routine podiatric nail visits every 2-3 months.   - With patient's permission, nails were aggressively reduced and debrided x 10 to their soft tissue attachment mechanically and with electric , removing all offending nail and debris. Patient relates relief following the procedure. He will continue to monitor the areas daily, inspect his feet, wear protective shoe gear when ambulatory, moisturizer to maintain skin integrity and  follow in this office in approximately 2-3 months, sooner p.r.n.

## 2025-01-21 ENCOUNTER — DOCUMENT SCAN (OUTPATIENT)
Dept: HOME HEALTH SERVICES | Facility: HOSPITAL | Age: 70
End: 2025-01-21
Payer: MEDICARE

## 2025-01-31 ENCOUNTER — DOCUMENT SCAN (OUTPATIENT)
Dept: HOME HEALTH SERVICES | Facility: HOSPITAL | Age: 70
End: 2025-01-31
Payer: MEDICARE

## 2025-02-07 NOTE — LETTER
October 13, 2020      Rosalva Manning MD  [address]  [facility] [address]           [facility] [address]  [address]  [place] [address]  Phone: [phone]  Fax: [phone]          Patient: Miguel Moore   MR Number: [id number]   YOB: 1955   Date of Visit: 9/22/2020       Dear Dr. Rosalva Manning:    Thank you for referring Miguel Moore to me for evaluation. Attached you will find relevant portions of my assessment and plan of care.    If you have questions, please do not hesitate to call me. I look forward to following Miguel Moore along with you.    Sincerely,    Janie Guerrero MD    Enclosure  CC:  No Recipients    If you would like to receive this communication electronically, please contact [email or web address] or [phone] to request more information on [facility] access.    For providers and/or their staff who would like to refer a patient to [facility], please contact us through our one-stop-shop provider referral line, [facility], at [phone].    If you feel you have received this communication in error or would no longer like to receive these types of communications, please e-mail [email or web address]          Patient is in the supine position.   The body was positioned using the following devices: gel pad mattress and blanket.  The head was positioned using the following devices: gel pad mattress and regular pillow.  The left arm was positioned using the following devices: arm board and blanket.  The right arm was positioned using the following devices: arm board and blanket.  The left leg was positioned using the following devices: blanket.   The right leg was positioned using the following devices: blanket.  The patient was positioned by Amanda Gray RN, Shirley Morales RN

## 2025-02-11 ENCOUNTER — EXTERNAL HOME HEALTH (OUTPATIENT)
Dept: HOME HEALTH SERVICES | Facility: HOSPITAL | Age: 70
End: 2025-02-11
Payer: MEDICARE

## 2025-02-13 ENCOUNTER — TELEPHONE (OUTPATIENT)
Dept: UROLOGY | Facility: CLINIC | Age: 70
End: 2025-02-13
Payer: MEDICARE

## 2025-02-15 ENCOUNTER — HOSPITAL ENCOUNTER (INPATIENT)
Facility: HOSPITAL | Age: 70
LOS: 12 days | Discharge: NURSING, REHAB OR LONG-TERM CARE | DRG: 521 | End: 2025-02-27
Attending: EMERGENCY MEDICINE | Admitting: STUDENT IN AN ORGANIZED HEALTH CARE EDUCATION/TRAINING PROGRAM
Payer: MEDICARE

## 2025-02-15 DIAGNOSIS — R07.9 CHEST PAIN: ICD-10-CM

## 2025-02-15 DIAGNOSIS — S72.001A CLOSED FRACTURE OF RIGHT HIP, INITIAL ENCOUNTER: Primary | ICD-10-CM

## 2025-02-15 DIAGNOSIS — E78.5 DYSLIPIDEMIA: Chronic | ICD-10-CM

## 2025-02-15 DIAGNOSIS — T07.XXXA WOUNDS, MULTIPLE: Chronic | ICD-10-CM

## 2025-02-15 DIAGNOSIS — D63.1 ANEMIA IN CHRONIC KIDNEY DISEASE, ON CHRONIC DIALYSIS: Chronic | ICD-10-CM

## 2025-02-15 DIAGNOSIS — S72.001A CLOSED FRACTURE OF NECK OF RIGHT FEMUR, INITIAL ENCOUNTER: ICD-10-CM

## 2025-02-15 DIAGNOSIS — S72.001A CLOSED FRACTURE OF RIGHT HIP: ICD-10-CM

## 2025-02-15 DIAGNOSIS — Z99.2 ANEMIA IN CHRONIC KIDNEY DISEASE, ON CHRONIC DIALYSIS: Chronic | ICD-10-CM

## 2025-02-15 DIAGNOSIS — Z01.818 PRE-OP EXAM: ICD-10-CM

## 2025-02-15 DIAGNOSIS — Z99.2 ESRD (END STAGE RENAL DISEASE) ON DIALYSIS: ICD-10-CM

## 2025-02-15 DIAGNOSIS — N40.0 BENIGN NON-NODULAR PROSTATIC HYPERPLASIA WITHOUT LOWER URINARY TRACT SYMPTOMS: Chronic | ICD-10-CM

## 2025-02-15 DIAGNOSIS — N18.6 ANEMIA IN CHRONIC KIDNEY DISEASE, ON CHRONIC DIALYSIS: Chronic | ICD-10-CM

## 2025-02-15 DIAGNOSIS — N18.6 ESRD (END STAGE RENAL DISEASE) ON DIALYSIS: ICD-10-CM

## 2025-02-15 DIAGNOSIS — S72.001A CLOSED DISPLACED FRACTURE OF RIGHT FEMORAL NECK: ICD-10-CM

## 2025-02-15 LAB
ABO + RH BLD: NORMAL
ALBUMIN SERPL BCP-MCNC: 3.4 G/DL (ref 3.5–5.2)
ALP SERPL-CCNC: 76 U/L (ref 40–150)
ALT SERPL W/O P-5'-P-CCNC: <5 U/L (ref 10–44)
ANION GAP SERPL CALC-SCNC: 18 MMOL/L (ref 8–16)
APTT PPP: 29.6 SEC (ref 21–32)
APTT PPP: 29.6 SEC (ref 21–32)
APTT PPP: 30.6 SEC (ref 21–32)
AST SERPL-CCNC: 12 U/L (ref 10–40)
BASOPHILS # BLD AUTO: 0.02 K/UL (ref 0–0.2)
BASOPHILS # BLD AUTO: 0.02 K/UL (ref 0–0.2)
BASOPHILS NFR BLD: 0.3 % (ref 0–1.9)
BASOPHILS NFR BLD: 0.4 % (ref 0–1.9)
BILIRUB SERPL-MCNC: 0.6 MG/DL (ref 0.1–1)
BLD GP AB SCN CELLS X3 SERPL QL: NORMAL
BUN SERPL-MCNC: 41 MG/DL (ref 8–23)
CALCIUM SERPL-MCNC: 8.6 MG/DL (ref 8.7–10.5)
CHLORIDE SERPL-SCNC: 97 MMOL/L (ref 95–110)
CO2 SERPL-SCNC: 24 MMOL/L (ref 23–29)
CREAT SERPL-MCNC: 8.6 MG/DL (ref 0.5–1.4)
DIFFERENTIAL METHOD BLD: ABNORMAL
DIFFERENTIAL METHOD BLD: ABNORMAL
EOSINOPHIL # BLD AUTO: 0 K/UL (ref 0–0.5)
EOSINOPHIL # BLD AUTO: 0.1 K/UL (ref 0–0.5)
EOSINOPHIL NFR BLD: 0.5 % (ref 0–8)
EOSINOPHIL NFR BLD: 1.1 % (ref 0–8)
ERYTHROCYTE [DISTWIDTH] IN BLOOD BY AUTOMATED COUNT: 14.1 % (ref 11.5–14.5)
ERYTHROCYTE [DISTWIDTH] IN BLOOD BY AUTOMATED COUNT: 14.3 % (ref 11.5–14.5)
EST. GFR  (NO RACE VARIABLE): 6 ML/MIN/1.73 M^2
GLUCOSE SERPL-MCNC: 105 MG/DL (ref 70–110)
HCT VFR BLD AUTO: 34.1 % (ref 40–54)
HCT VFR BLD AUTO: 35.6 % (ref 40–54)
HGB BLD-MCNC: 11.8 G/DL (ref 14–18)
HGB BLD-MCNC: 12.2 G/DL (ref 14–18)
IMM GRANULOCYTES # BLD AUTO: 0.02 K/UL (ref 0–0.04)
IMM GRANULOCYTES # BLD AUTO: 0.02 K/UL (ref 0–0.04)
IMM GRANULOCYTES NFR BLD AUTO: 0.3 % (ref 0–0.5)
IMM GRANULOCYTES NFR BLD AUTO: 0.4 % (ref 0–0.5)
INR PPP: 1.1 (ref 0.8–1.2)
INR PPP: 1.1 (ref 0.8–1.2)
LYMPHOCYTES # BLD AUTO: 0.5 K/UL (ref 1–4.8)
LYMPHOCYTES # BLD AUTO: 0.9 K/UL (ref 1–4.8)
LYMPHOCYTES NFR BLD: 17.6 % (ref 18–48)
LYMPHOCYTES NFR BLD: 7.8 % (ref 18–48)
MCH RBC QN AUTO: 27.2 PG (ref 27–31)
MCH RBC QN AUTO: 27.3 PG (ref 27–31)
MCHC RBC AUTO-ENTMCNC: 34.3 G/DL (ref 32–36)
MCHC RBC AUTO-ENTMCNC: 34.6 G/DL (ref 32–36)
MCV RBC AUTO: 79 FL (ref 82–98)
MCV RBC AUTO: 79 FL (ref 82–98)
MONOCYTES # BLD AUTO: 0.5 K/UL (ref 0.3–1)
MONOCYTES # BLD AUTO: 0.5 K/UL (ref 0.3–1)
MONOCYTES NFR BLD: 8.2 % (ref 4–15)
MONOCYTES NFR BLD: 9.6 % (ref 4–15)
NEUTROPHILS # BLD AUTO: 3.8 K/UL (ref 1.8–7.7)
NEUTROPHILS # BLD AUTO: 4.9 K/UL (ref 1.8–7.7)
NEUTROPHILS NFR BLD: 70.9 % (ref 38–73)
NEUTROPHILS NFR BLD: 82.9 % (ref 38–73)
NRBC BLD-RTO: 0 /100 WBC
NRBC BLD-RTO: 0 /100 WBC
PLATELET # BLD AUTO: 141 K/UL (ref 150–450)
PLATELET # BLD AUTO: 153 K/UL (ref 150–450)
PMV BLD AUTO: 9.4 FL (ref 9.2–12.9)
PMV BLD AUTO: 9.8 FL (ref 9.2–12.9)
POCT GLUCOSE: 159 MG/DL (ref 70–110)
POTASSIUM SERPL-SCNC: 4.8 MMOL/L (ref 3.5–5.1)
PROT SERPL-MCNC: 7.2 G/DL (ref 6–8.4)
PROTHROMBIN TIME: 11.8 SEC (ref 9–12.5)
PROTHROMBIN TIME: 11.9 SEC (ref 9–12.5)
RBC # BLD AUTO: 4.32 M/UL (ref 4.6–6.2)
RBC # BLD AUTO: 4.49 M/UL (ref 4.6–6.2)
SODIUM SERPL-SCNC: 139 MMOL/L (ref 136–145)
WBC # BLD AUTO: 5.33 K/UL (ref 3.9–12.7)
WBC # BLD AUTO: 5.86 K/UL (ref 3.9–12.7)

## 2025-02-15 PROCEDURE — 63600175 PHARM REV CODE 636 W HCPCS: Mod: HCNC | Performed by: STUDENT IN AN ORGANIZED HEALTH CARE EDUCATION/TRAINING PROGRAM

## 2025-02-15 PROCEDURE — 63600175 PHARM REV CODE 636 W HCPCS: Mod: JZ,TB,HCNC | Performed by: EMERGENCY MEDICINE

## 2025-02-15 PROCEDURE — 85025 COMPLETE CBC W/AUTO DIFF WBC: CPT | Mod: HCNC | Performed by: STUDENT IN AN ORGANIZED HEALTH CARE EDUCATION/TRAINING PROGRAM

## 2025-02-15 PROCEDURE — 85730 THROMBOPLASTIN TIME PARTIAL: CPT | Mod: 91,HCNC | Performed by: STUDENT IN AN ORGANIZED HEALTH CARE EDUCATION/TRAINING PROGRAM

## 2025-02-15 PROCEDURE — 21400001 HC TELEMETRY ROOM: Mod: HCNC

## 2025-02-15 PROCEDURE — 80053 COMPREHEN METABOLIC PANEL: CPT | Mod: HCNC | Performed by: EMERGENCY MEDICINE

## 2025-02-15 PROCEDURE — 25000003 PHARM REV CODE 250: Mod: HCNC | Performed by: STUDENT IN AN ORGANIZED HEALTH CARE EDUCATION/TRAINING PROGRAM

## 2025-02-15 PROCEDURE — 85610 PROTHROMBIN TIME: CPT | Mod: 91,HCNC | Performed by: EMERGENCY MEDICINE

## 2025-02-15 PROCEDURE — 25000003 PHARM REV CODE 250: Mod: HCNC | Performed by: EMERGENCY MEDICINE

## 2025-02-15 PROCEDURE — 86901 BLOOD TYPING SEROLOGIC RH(D): CPT | Mod: HCNC | Performed by: EMERGENCY MEDICINE

## 2025-02-15 PROCEDURE — 85610 PROTHROMBIN TIME: CPT | Mod: HCNC | Performed by: STUDENT IN AN ORGANIZED HEALTH CARE EDUCATION/TRAINING PROGRAM

## 2025-02-15 PROCEDURE — 93005 ELECTROCARDIOGRAM TRACING: CPT | Mod: HCNC

## 2025-02-15 PROCEDURE — 85025 COMPLETE CBC W/AUTO DIFF WBC: CPT | Mod: 91,HCNC | Performed by: EMERGENCY MEDICINE

## 2025-02-15 PROCEDURE — 93010 ELECTROCARDIOGRAM REPORT: CPT | Mod: HCNC,,, | Performed by: INTERNAL MEDICINE

## 2025-02-15 PROCEDURE — 36415 COLL VENOUS BLD VENIPUNCTURE: CPT | Mod: HCNC | Performed by: STUDENT IN AN ORGANIZED HEALTH CARE EDUCATION/TRAINING PROGRAM

## 2025-02-15 PROCEDURE — 99285 EMERGENCY DEPT VISIT HI MDM: CPT | Mod: 25

## 2025-02-15 RX ORDER — FINASTERIDE 5 MG/1
5 TABLET, FILM COATED ORAL DAILY
Status: DISCONTINUED | OUTPATIENT
Start: 2025-02-16 | End: 2025-02-27 | Stop reason: HOSPADM

## 2025-02-15 RX ORDER — HEPARIN SODIUM,PORCINE/D5W 25000/250
0-40 INTRAVENOUS SOLUTION INTRAVENOUS CONTINUOUS
Status: DISCONTINUED | OUTPATIENT
Start: 2025-02-15 | End: 2025-02-17

## 2025-02-15 RX ORDER — NALOXONE HCL 0.4 MG/ML
0.02 VIAL (ML) INJECTION
Status: DISCONTINUED | OUTPATIENT
Start: 2025-02-15 | End: 2025-02-27 | Stop reason: HOSPADM

## 2025-02-15 RX ORDER — OXYCODONE HYDROCHLORIDE 5 MG/1
10 TABLET ORAL EVERY 6 HOURS PRN
Refills: 0 | Status: DISCONTINUED | OUTPATIENT
Start: 2025-02-15 | End: 2025-02-27 | Stop reason: HOSPADM

## 2025-02-15 RX ORDER — SODIUM CHLORIDE 0.9 % (FLUSH) 0.9 %
10 SYRINGE (ML) INJECTION EVERY 12 HOURS PRN
Status: DISCONTINUED | OUTPATIENT
Start: 2025-02-15 | End: 2025-02-27 | Stop reason: HOSPADM

## 2025-02-15 RX ORDER — FERROUS GLUCONATE 324(37.5)
324 TABLET ORAL
Status: DISCONTINUED | OUTPATIENT
Start: 2025-02-16 | End: 2025-02-24

## 2025-02-15 RX ORDER — SEVELAMER CARBONATE 800 MG/1
800 TABLET, FILM COATED ORAL
Status: DISCONTINUED | OUTPATIENT
Start: 2025-02-15 | End: 2025-02-27 | Stop reason: HOSPADM

## 2025-02-15 RX ORDER — HYDROCODONE BITARTRATE AND ACETAMINOPHEN 5; 325 MG/1; MG/1
1 TABLET ORAL EVERY 6 HOURS PRN
Refills: 0 | Status: DISCONTINUED | OUTPATIENT
Start: 2025-02-15 | End: 2025-02-27 | Stop reason: HOSPADM

## 2025-02-15 RX ORDER — EZETIMIBE 10 MG/1
10 TABLET ORAL DAILY
Status: DISCONTINUED | OUTPATIENT
Start: 2025-02-16 | End: 2025-02-27 | Stop reason: HOSPADM

## 2025-02-15 RX ORDER — ATORVASTATIN CALCIUM 40 MG/1
80 TABLET, FILM COATED ORAL DAILY
Status: DISCONTINUED | OUTPATIENT
Start: 2025-02-16 | End: 2025-02-27 | Stop reason: HOSPADM

## 2025-02-15 RX ORDER — TAMSULOSIN HYDROCHLORIDE 0.4 MG/1
2 CAPSULE ORAL DAILY
Status: DISCONTINUED | OUTPATIENT
Start: 2025-02-16 | End: 2025-02-27 | Stop reason: HOSPADM

## 2025-02-15 RX ORDER — PANTOPRAZOLE SODIUM 40 MG/1
40 TABLET, DELAYED RELEASE ORAL 2 TIMES DAILY
Status: DISCONTINUED | OUTPATIENT
Start: 2025-02-15 | End: 2025-02-18

## 2025-02-15 RX ORDER — ACETAMINOPHEN 325 MG/1
650 TABLET ORAL EVERY 4 HOURS PRN
Status: DISCONTINUED | OUTPATIENT
Start: 2025-02-15 | End: 2025-02-27 | Stop reason: HOSPADM

## 2025-02-15 RX ORDER — HYDROMORPHONE HYDROCHLORIDE 1 MG/ML
0.5 INJECTION, SOLUTION INTRAMUSCULAR; INTRAVENOUS; SUBCUTANEOUS
Refills: 0 | Status: COMPLETED | OUTPATIENT
Start: 2025-02-15 | End: 2025-02-15

## 2025-02-15 RX ORDER — IBUPROFEN 200 MG
16 TABLET ORAL
Status: DISCONTINUED | OUTPATIENT
Start: 2025-02-15 | End: 2025-02-27 | Stop reason: HOSPADM

## 2025-02-15 RX ORDER — IBUPROFEN 200 MG
24 TABLET ORAL
Status: DISCONTINUED | OUTPATIENT
Start: 2025-02-15 | End: 2025-02-27 | Stop reason: HOSPADM

## 2025-02-15 RX ORDER — NAPROXEN SODIUM 220 MG/1
81 TABLET, FILM COATED ORAL DAILY
Status: DISCONTINUED | OUTPATIENT
Start: 2025-02-16 | End: 2025-02-27 | Stop reason: HOSPADM

## 2025-02-15 RX ORDER — GLUCAGON 1 MG
1 KIT INJECTION
Status: DISCONTINUED | OUTPATIENT
Start: 2025-02-15 | End: 2025-02-27 | Stop reason: HOSPADM

## 2025-02-15 RX ORDER — ONDANSETRON HYDROCHLORIDE 2 MG/ML
4 INJECTION, SOLUTION INTRAVENOUS EVERY 8 HOURS PRN
Status: DISCONTINUED | OUTPATIENT
Start: 2025-02-15 | End: 2025-02-18

## 2025-02-15 RX ORDER — HYDROCODONE BITARTRATE AND ACETAMINOPHEN 10; 325 MG/1; MG/1
1 TABLET ORAL
Refills: 0 | Status: COMPLETED | OUTPATIENT
Start: 2025-02-15 | End: 2025-02-15

## 2025-02-15 RX ADMIN — HYDROCODONE BITARTRATE AND ACETAMINOPHEN 1 TABLET: 10; 325 TABLET ORAL at 12:02

## 2025-02-15 RX ADMIN — HYDROCODONE BITARTRATE AND ACETAMINOPHEN 1 TABLET: 5; 325 TABLET ORAL at 08:02

## 2025-02-15 RX ADMIN — PANTOPRAZOLE SODIUM 40 MG: 40 TABLET, DELAYED RELEASE ORAL at 08:02

## 2025-02-15 RX ADMIN — HEPARIN SODIUM 12 UNITS/KG/HR: 10000 INJECTION, SOLUTION INTRAVENOUS at 04:02

## 2025-02-15 RX ADMIN — SEVELAMER CARBONATE 800 MG: 800 TABLET, FILM COATED ORAL at 05:02

## 2025-02-15 RX ADMIN — HYDROMORPHONE HYDROCHLORIDE 0.5 MG: 1 INJECTION, SOLUTION INTRAMUSCULAR; INTRAVENOUS; SUBCUTANEOUS at 03:02

## 2025-02-15 NOTE — ASSESSMENT & PLAN NOTE
Patient presents with a fall, subsequent right femoral neck fracture.  Orthopedic surgery consulted, plan for hip replacement on 02/17/2025.  Continue with supportive care and pain control.  NPO at midnight Sunday.

## 2025-02-15 NOTE — ASSESSMENT & PLAN NOTE
"Patient's FSGs are controlled on current medication regimen.  Last A1c reviewed-   Lab Results   Component Value Date    HGBA1C 5.8 12/09/2024     Most recent fingerstick glucose reviewed- No results for input(s): "POCTGLUCOSE" in the last 24 hours.  Current correctional scale   none  "

## 2025-02-15 NOTE — ED PROVIDER NOTES
Encounter Date: 2/15/2025    SCRIBE #1 NOTE: I, Rachele Selby, am scribing for, and in the presence of,  Homer Caraballo MD. I have scribed the following portions of the note - Other sections scribed: HPI, ROS, MDM.       History     Chief Complaint   Patient presents with    Fall     Patient comes in with EMS reports fall at home onto right hip, no LOC, is on blood thinners     This is a 69 y.o. M with a PMHx of MI, seizures, T2DM, HTN,  DVT who presents to the Emergency Department bib EMS with traumatic right hip pain today 1130.  Patient reports he was trying to ambulate, had a trip and fall injuring his right hip. Patient denies hitting his head, LOC, back pain, neck pain, dyspnea, chest pain, enuresis, encopresis, or other associated symptoms. Notes antiplatelet/anticoagulant use Plavix and Elquis. Denies any injuries. Notes wounds that are being cared for to his right leg, history of stroke with right-sided weakness.     The history is provided by the patient. No  was used.     Review of patient's allergies indicates:   Allergen Reactions    Ace inhibitors      Hyperkalemia 8/2018  Other reaction(s): Unknown    Penicillins Hives     Tolerated cefepime and cefdinir previously    Tizanidine      Felt hot     Past Medical History:   Diagnosis Date    Allergy     Clotting disorder     Eliquis and Plavix    Deep vein thrombosis     Diabetes mellitus, type 2     Hypertension     Nuclear sclerosis of both eyes 06/09/2017    Renal disorder     Seizures     Stroke     Urinary tract infection      Past Surgical History:   Procedure Laterality Date    CATARACT EXTRACTION W/  INTRAOCULAR LENS IMPLANT Right 10/05/2017    Dr. Tay    CATARACT EXTRACTION W/  INTRAOCULAR LENS IMPLANT Left 10/19/2017    Dr. Tay    CYSTOSCOPY W/ RETROGRADES Bilateral 2/18/2021    Procedure: CYSTOSCOPY, WITH RETROGRADE PYELOGRAM;  Surgeon: JEMMA Styles MD;  Location: [facility] OR;  Service: Urology;   Laterality: Bilateral;  REQUESTING EARLY AS POSSIBE-LO / 2/8/2021 @ 1:13PM  RN Pre Op 2-11-21, Covid NEGATIVE ON  2-17-21.  C A    ESOPHAGOGASTRODUODENOSCOPY N/A 8/17/2020    Procedure: EGD (ESOPHAGOGASTRODUODENOSCOPY);  Surgeon: Desmond Chapman MD;  Location: [facility] ENDO;  Service: Endoscopy;  Laterality: N/A;    ESOPHAGOGASTRODUODENOSCOPY N/A 8/21/2024    Procedure: EGD (ESOPHAGOGASTRODUODENOSCOPY);  Surgeon: Tonie Chauhan MD;  Location: [facility] ENDO;  Service: Endoscopy;  Laterality: N/A;    ESOPHAGOGASTRODUODENOSCOPY N/A 12/5/2024    Procedure: EGD (ESOPHAGOGASTRODUODENOSCOPY);  Surgeon: Tonie Chauhan MD;  Location: [facility] ENDO;  Service: Endoscopy;  Laterality: N/A;  Bren Ovalles, standard prep, mailed , Eliquis, Dialysis LAB, ASam  ok to hold Eliquis 2 days per Dr Raymond-GT  11/8/24- pc complete. DBM  11/13 pt r/s, Eliquis hold x 2 days per Dr. Raymond see t/e 10/29/24, Dialysis MWF, K+ labs in AM, mailed -ml  11/27 precall com    EYE SURGERY Bilateral     cataract    FEMORAL ARTERY STENT      FRACTURE SURGERY      INCISION AND DRAINAGE, LOWER EXTREMITY Right 4/4/2024    Procedure: INCISION AND DRAINAGE, LOWER EXTREMITY;  Surgeon: Jimbo Montilla III, MD;  Location: [facility] OR;  Service: Orthopedics;  Laterality: Right;    INCISION AND DRAINAGE, LOWER EXTREMITY Right 4/6/2024    Procedure: INCISION AND DRAINAGE, LOWER EXTREMITY;  Surgeon: Desmond Barillas MD;  Location: [facility] OR;  Service: Orthopedics;  Laterality: Right;  foot and ankle    INCISION AND DRAINAGE, LOWER EXTREMITY Right 4/9/2024    Procedure: INCISION AND DRAINAGE, LOWER EXTREMITY- FOOT & ANKLE;  Surgeon: Jimbo Montilla III, MD;  Location: [facility] OR;  Service: Orthopedics;  Laterality: Right;  CULTURES, VASCHE    INCISION AND DRAINAGE, LOWER EXTREMITY Right 5/21/2024    Procedure: INCISION AND DRAINAGE, LOWER EXTREMITY;  Surgeon: Jimbo Montilla III, MD;  Location: [facility];  Service: Orthopedics;  Laterality: Right;  Pulsavac and Vashe    KNEE  SURGERY      bilateral scope    WOUND DRESSING Right 4/9/2024    Procedure: WOUND VAC EXCHANGE;  Surgeon: Jimbo Montilla III, MD;  Location: [facility];  Service: Orthopedics;  Laterality: Right;     Family History   Problem Relation Name Age of Onset    Diabetes Mother      Heart disease Mother      Hypertension Mother      Cataracts Mother      No Known Problems Father      Hypertension Sister      No Known Problems Brother      No Known Problems Maternal Aunt      No Known Problems Maternal Uncle      No Known Problems Paternal Aunt      No Known Problems Paternal Uncle      No Known Problems Maternal Grandmother      No Known Problems Maternal Grandfather      No Known Problems Paternal Grandmother      No Known Problems Paternal Grandfather      No Known Problems Daughter      No Known Problems Other      Amblyopia Neg Hx      Blindness Neg Hx      Cancer Neg Hx      Glaucoma Neg Hx      Macular degeneration Neg Hx      Retinal detachment Neg Hx      Strabismus Neg Hx      Stroke Neg Hx      Thyroid disease Neg Hx       Social History[1]  Review of Systems   Constitutional:  Negative for fever.   HENT:  Negative for rhinorrhea and sore throat.    Eyes:  Negative for visual disturbance.   Respiratory:  Negative for cough and shortness of breath.    Cardiovascular:  Negative for chest pain.   Gastrointestinal:  Negative for abdominal pain, blood in stool, constipation, diarrhea, nausea and vomiting.   Genitourinary:  Negative for difficulty urinating, dysuria and enuresis.   Musculoskeletal:  Positive for arthralgias (R hip). Negative for back pain and neck pain.   Skin:  Negative for rash.   Hematological:  Bruises/bleeds easily.       Physical Exam     Initial Vitals [02/15/25 1200]   BP Pulse Resp Temp SpO2   (!) 109/54 94 18 97.9 °F (36.6 °C) 96 %      MAP       --         Physical Exam    Nursing note and vitals reviewed.  Constitutional: He appears well-developed and well-nourished.   HENT:   Head:  Normocephalic and atraumatic.   Eyes: EOM are normal. Pupils are equal, round, and reactive to light.   Neck: Neck supple. No thyromegaly present. No JVD present.   Normal range of motion.  Cardiovascular:  Normal rate and regular rhythm.     Exam reveals no gallop and no friction rub.       No murmur heard.  Pulmonary/Chest: Breath sounds normal. No respiratory distress.   Abdominal: Abdomen is soft. Bowel sounds are normal. There is no abdominal tenderness.   Musculoskeletal:      Cervical back: Normal range of motion and neck supple.     Neurological: He is alert and oriented to person, place, and time. He has normal strength. GCS score is 15. GCS eye subscore is 4. GCS verbal subscore is 5. GCS motor subscore is 6.   Skin: Skin is warm. Capillary refill takes less than 2 seconds.   Psychiatric: He has a normal mood and affect. His behavior is normal. Thought content normal.                       ED Course   Procedures  Labs Reviewed   CBC W/ AUTO DIFFERENTIAL - Abnormal       Result Value    WBC 5.33      RBC 4.49 (*)     Hemoglobin 12.2 (*)     Hematocrit 35.6 (*)     MCV 79 (*)     MCH 27.2      MCHC 34.3      RDW 14.3      Platelets 141 (*)     MPV 9.4      Immature Granulocytes 0.4      Gran # (ANC) 3.8      Immature Grans (Abs) 0.02      Lymph # 0.9 (*)     Mono # 0.5      Eos # 0.1      Baso # 0.02      nRBC 0      Gran % 70.9      Lymph % 17.6 (*)     Mono % 9.6      Eosinophil % 1.1      Basophil % 0.4      Differential Method Automated     COMPREHENSIVE METABOLIC PANEL - Abnormal    Sodium 139      Potassium 4.8      Chloride 97      CO2 24      Glucose 105      BUN 41 (*)     Creatinine 8.6 (*)     Calcium 8.6 (*)     Total Protein 7.2      Albumin 3.4 (*)     Total Bilirubin 0.6      Alkaline Phosphatase 76      AST 12      ALT <5 (*)     eGFR 6 (*)     Anion Gap 18 (*)    CBC W/ AUTO DIFFERENTIAL - Abnormal    WBC 5.86      RBC 4.32 (*)     Hemoglobin 11.8 (*)     Hematocrit 34.1 (*)     MCV 79 (*)      MCH 27.3      MCHC 34.6      RDW 14.1      Platelets 153      MPV 9.8      Immature Granulocytes 0.3      Gran # (ANC) 4.9      Immature Grans (Abs) 0.02      Lymph # 0.5 (*)     Mono # 0.5      Eos # 0.0      Baso # 0.02      nRBC 0      Gran % 82.9 (*)     Lymph % 7.8 (*)     Mono % 8.2      Eosinophil % 0.5      Basophil % 0.3      Differential Method Automated      Narrative:     Draw baseline aPTT prior to starting the heparin bolus or  infusion  PTT daily if no changes in infusion rate  (if patient is on warfarin prior to heparin therapy)   PROTIME-INR    Prothrombin Time 11.9      INR 1.1     APTT    aPTT 29.6      Narrative:     Draw baseline aPTT prior to starting the heparin bolus or  infusion  PTT daily if no changes in infusion rate  (if patient is on warfarin prior to heparin therapy)   APTT    aPTT 29.6      Narrative:     Draw baseline aPTT prior to starting the heparin bolus or  infusion  PTT daily if no changes in infusion rate  (if patient is on warfarin prior to heparin therapy)   PROTIME-INR    Prothrombin Time 11.8      INR 1.1      Narrative:     Draw baseline aPTT prior to starting the heparin bolus or  infusion  PTT daily if no changes in infusion rate  (if patient is on warfarin prior to heparin therapy)   TYPE AND SCREEN PREOP    Group & Rh A POS      Indirect Henrry NEG            Imaging Results              CT Hip w/o Contrast Right w/Eitan Protocol (Final result)  Result time 02/15/25 18:01:50      Final result by Sabi Parada MD (02/15/25 18:01:50)                   Impression:      Right femoral neck fracture with associated varus deformity.    Fecal impaction.  Cannot exclude stercoral colitis.    Urinary bladder wall thickening.  Findings may be due to obstructive uropathy, acute cystitis, adjacent inflammatory condition, neoplasia or other etiology.    Prostatomegaly.      Electronically signed by: Sabi Parada  Date:    02/15/2025  Time:    18:01                Narrative:    EXAMINATION:  CT HIP WITHOUT CONTRAST RIGHT WITH EITAN PROTOCOL    CLINICAL HISTORY:  Fracture of unspecified part of neck of right femur, initial encounter for closed fractureRight femoral neck fracture, first one inadequate;    TECHNIQUE:  0.625 mm axial images were obtained through the right hip.  No coronal sagittal reformatted images were provided.    COMPARISON:  02/15/2024 13:59    FINDINGS:  There is an acute traumatic fracture involving the right femoral neck with associated varus deformity.  There is no hip dislocation.  The bones are diffusely osteopenic.  The rectum is distended with copious fecal material.  The prostate gland is enlarged.  There is circumferential urinary bladder wall thickening.  Advanced vascular calcifications are seen.                                       CT Hip w/o Contrast Right w/Eitan Protocol (Final result)  Result time 02/15/25 14:37:47      Final result by Matt Naqvi DO (02/15/25 14:37:47)                   Impression:      As above      Electronically signed by: Matt Naqvi DO  Date:    02/15/2025  Time:    14:37               Narrative:    EXAMINATION:  CT HIP WITHOUT CONTRAST RIGHT W/ EITAN PROTOCOL    CLINICAL HISTORY:  s/p femoral neck fracture;  Fracture of unspecified part of neck of right femur, initial encounter for closed fracture    TECHNIQUE:  Thin-section axial images and was obtained from just above the iliac crests through the right hip with additional imaging through the distal femur and bilateral proximal tibia and fibula.  Study was performed utilizing a Eitan protocol.    COMPARISON:  None    FINDINGS:  There is an acute fracture of the right femoral neck.  The bony pelvis is intact.  The visualized intrapelvic contents demonstrate vascular calcifications seen involving the aorta.  Prominent stool burden seen projecting over the rectum.  The urinary bladder is incompletely distended although there does appear to be circumferential wall  thickening associated with the urinary bladder.  This is likely secondary to bladder outlet obstruction from an enlarged prostate gland.    No acute fractures definitely seen in identifying either knee.  Vascular calcifications are present.                                       X-Ray Chest AP Portable (Final result)  Result time 02/15/25 13:48:46      Final result by Matt Naqvi DO (02/15/25 13:48:46)                   Impression:      No acute cardiopulmonary process.      Electronically signed by: Matt Naqvi DO  Date:    02/15/2025  Time:    13:48               Narrative:    EXAMINATION:  XR CHEST AP PORTABLE    CLINICAL HISTORY:  pre op;    TECHNIQUE:  Single frontal view of the chest was performed.    COMPARISON:  03/26/2024    FINDINGS:  No infiltrates or pleural effusions are identified.  The heart does not appear to be enlarged for a portable exam.  There is tortuosity of the descending thoracic aorta with calcification of the aortic knob.                                       X-Ray Hip 2 or 3 views Right with Pelvis when performed (Final result)  Result time 02/15/25 13:24:21      Final result by Matt Naqvi DO (02/15/25 13:24:21)                   Impression:      1.  As above      Electronically signed by: Matt Naqvi DO  Date:    02/15/2025  Time:    13:24               Narrative:    EXAMINATION:  XR HIP WITH PELVIS WHEN PERFORMED 2 OR 3 VIEWS RIGHT    CLINICAL HISTORY:  fall, R hip pain;    TECHNIQUE:  AP view of the pelvis and frog leg lateral view of the right hip were performed.    COMPARISON:  None    FINDINGS:  There is an acute femoral neck fracture with significant distraction and superior displacement of the shaft.  As the bony pelvis appears to be intact.  Generalized osteopenia noted.  Vascular calcifications are present.                                       Medications   aspirin chewable tablet 81 mg (0 mg Oral Hold 2/17/25 0900)   atorvastatin tablet 80 mg (80 mg Oral  Given 2/17/25 0933)   ezetimibe tablet 10 mg (10 mg Oral Given 2/17/25 0933)   ferrous gluconate tablet 324 mg (324 mg Oral Not Given 2/17/25 0745)   finasteride tablet 5 mg (5 mg Oral Given 2/17/25 0934)   pantoprazole EC tablet 40 mg (40 mg Oral Given 2/17/25 0933)   sevelamer carbonate tablet 800 mg (800 mg Oral Not Given 2/17/25 0745)   tamsulosin 24 hr capsule 0.8 mg (0.8 mg Oral Given 2/17/25 0934)   vitamin renal formula (B-complex-vitamin c-folic acid) 1 mg per capsule 1 capsule (1 capsule Oral Given 2/17/25 0933)   sodium chloride 0.9% flush 10 mL (has no administration in time range)   naloxone 0.4 mg/mL injection 0.02 mg (has no administration in time range)   glucose chewable tablet 16 g (has no administration in time range)   glucose chewable tablet 24 g (has no administration in time range)   dextrose 50% injection 12.5 g (has no administration in time range)   dextrose 50% injection 25 g (has no administration in time range)   glucagon (human recombinant) injection 1 mg (has no administration in time range)   acetaminophen tablet 650 mg (has no administration in time range)   HYDROcodone-acetaminophen 5-325 mg per tablet 1 tablet (1 tablet Oral Given 2/16/25 2141)   oxyCODONE immediate release tablet 10 mg (10 mg Oral Given 2/17/25 0644)   ondansetron injection 4 mg (has no administration in time range)   heparin 25,000 units in dextrose 5% 250 mL (100 units/mL) infusion LOW INTENSITY nomogram - [facility] (0 Units/kg/hr × 72.6 kg Intravenous Stopped 2/17/25 0937)   heparin 25,000 units in dextrose 5% (100 units/ml) IV bolus from bag LOW INTENSITY nomogram - [facility] (4,350 Units Intravenous Bolus from Bag 2/15/25 4486)   heparin 25,000 units in dextrose 5% (100 units/ml) IV bolus from bag LOW INTENSITY nomogram - [facility] (2,170 Units Intravenous Bolus from Bag 2/16/25 0634)   HYDROcodone-acetaminophen  mg per tablet 1 tablet (1 tablet Oral Given 2/15/25 7954)   HYDROmorphone injection 0.5 mg (0.5 mg  Intravenous Given 2/15/25 7147)   heparin 25,000 units in dextrose 5% (100 units/ml) IV bolus from bag LOW INTENSITY nomogram - [facility] (4,350 Units Intravenous Bolus from Bag 2/15/25 1620)     Medical Decision Making  This is an emergent evaluation of a 69 y.o. male who presents bib EMS with traumatic R hip pain secondary to mechanical fall. The patient was seen and examined. The history and physical exam was obtained. The nursing notes and vital signs were reviewed. Secondary to symptoms and examination findings, I ordered labs, EKG, x-ray, bladder scan. Will treat pain.     My differential diagnosis includes, but is not limited to: Sprain, strain, fracture, dislocation, intracerebral bleed, concussion, facial fracture.       Amount and/or Complexity of Data Reviewed  External Data Reviewed: notes.  Labs: ordered. Decision-making details documented in ED Course.  Radiology: ordered. Decision-making details documented in ED Course.  ECG/medicine tests: ordered and independent interpretation performed. Decision-making details documented in ED Course.    Risk  Prescription drug management.  Decision regarding hospitalization.    Patient's ambulation is limited somewhat by right-sided weakness/paralysis due to prior stroke.  However per the patient per his daughter he still gets around using combination of cane and walker.  However he appears to be mostly bed-bound in his he has a healed sacral decubitus.  He has 2 wounds stage III to his right foot.  He has a healing stage II left heel wound.  He wears padded heel protectors and then tries to walk on them.  He slipped and fell.  He has a right femoral neck fracture.  Discussed with orthopedics who will perform the surgery on Monday.  Discuss with Hospital Medicine who accepted the admission.        Scribe Attestation:   Scribe #1: I performed the above scribed service and the documentation accurately describes the services I performed. I attest to the accuracy of the  note.                               Clinical Impression:  Final diagnoses:  [Z01.818] Pre-op exam  [S72.001A] Closed fracture of neck of right femur, initial encounter          ED Disposition Condition    Admit Stable             I, Homer Caraballo, personally performed the services described in this documentation. All medical record entries made by the scribe were at my direction and in my presence. I have reviewed the chart and agree that the record reflects my personal performance and is accurate and complete.      DISCLAIMER: This note was prepared with [employer] voice recognition transcription software. Garbled syntax, mangled pronouns, and other bizarre constructions may be attributed to that software system.         [1]   Social History  Tobacco Use    Smoking status: Never    Smokeless tobacco: Never   Substance Use Topics    Alcohol use: No    Drug use: No        Homer Caraballo MD  02/17/25 1033

## 2025-02-15 NOTE — H&P
[facility] Emergency Dep\[facility] [place] [facility]\"" Medicine  History & Physical    Patient Name: Miguel Moore  MRN: [id number]  Patient Class: IP- Inpatient  Admission Date: 2/15/2025  Attending Physician: Bonifacio Reyes MD   Primary Care Provider: Raciel Raymond MD         Patient information was obtained from patient, past medical records, and ER records.     Subjective:     Principal Problem:Closed fracture of right hip    Chief Complaint:   Chief Complaint   Patient presents with    Fall     Patient comes in with EMS reports fall at home onto right hip, no LOC, is on blood thinners        HPI: Is a 69-year-old male with history of CVA, hypertension, ESRD on HD, DVT on Eliquis who presents to the emergency department for evaluation after a fall.  Patient reports he gets around with a walker and he slipped and fell to his right hip.  Denies loss of consciousness, hitting of his head or other recent issues.  In the ED, Xray of right hip positive for acute right femoral neck fracture. CT confirmed. Orthopedic Surgery consulted. Plan for OR on 2/17/25. He will be admitted to hospital medicine for further management.     Past Medical History:   Diagnosis Date    Allergy     Clotting disorder     Eliquis and Plavix    Deep vein thrombosis     Diabetes mellitus, type 2     Hypertension     Nuclear sclerosis of both eyes 06/09/2017    Renal disorder     Seizures     Stroke     Urinary tract infection        Past Surgical History:   Procedure Laterality Date    CATARACT EXTRACTION W/  INTRAOCULAR LENS IMPLANT Right 10/05/2017    Dr. Tay    CATARACT EXTRACTION W/  INTRAOCULAR LENS IMPLANT Left 10/19/2017    Dr. Tay    CYSTOSCOPY W/ RETROGRADES Bilateral 2/18/2021    Procedure: CYSTOSCOPY, WITH RETROGRADE PYELOGRAM;  Surgeon: JEMMA Styles MD;  Location: [facility];  Service: Urology;  Laterality: Bilateral;  REQUESTING EARLY AS POSSIBE-LO / 2/8/2021 @ 1:13PM  RN Pre Op 2-11-21, Covid NEGATIVE ON  2-17-21.  C DIA     ESOPHAGOGASTRODUODENOSCOPY N/A 8/17/2020    Procedure: EGD (ESOPHAGOGASTRODUODENOSCOPY);  Surgeon: Desmond Chapman MD;  Location: [facility] ENDO;  Service: Endoscopy;  Laterality: N/A;    ESOPHAGOGASTRODUODENOSCOPY N/A 8/21/2024    Procedure: EGD (ESOPHAGOGASTRODUODENOSCOPY);  Surgeon: Tonie Chauhan MD;  Location: [facility] ENDO;  Service: Endoscopy;  Laterality: N/A;    ESOPHAGOGASTRODUODENOSCOPY N/A 12/5/2024    Procedure: EGD (ESOPHAGOGASTRODUODENOSCOPY);  Surgeon: Tonie Chauhan MD;  Location: [facility] ENDO;  Service: Endoscopy;  Laterality: N/A;  Bren Ovalles, standard prep, mailed , Eliquis, Dialysis LAB, ASam  ok to hold Eliquis 2 days per Dr Raymond-GT  11/8/24- pc complete. DBM  11/13 pt r/s, Eliquis hold x 2 days per Dr. Raymond see t/e 10/29/24, Dialysis MWF, K+ labs in AM, mailed -ml  11/27 precall com    EYE SURGERY Bilateral     cataract    FEMORAL ARTERY STENT      FRACTURE SURGERY      INCISION AND DRAINAGE, LOWER EXTREMITY Right 4/4/2024    Procedure: INCISION AND DRAINAGE, LOWER EXTREMITY;  Surgeon: Jimbo Montilla III, MD;  Location: [facility] OR;  Service: Orthopedics;  Laterality: Right;    INCISION AND DRAINAGE, LOWER EXTREMITY Right 4/6/2024    Procedure: INCISION AND DRAINAGE, LOWER EXTREMITY;  Surgeon: Desmond Barillas MD;  Location: [facility] OR;  Service: Orthopedics;  Laterality: Right;  foot and ankle    INCISION AND DRAINAGE, LOWER EXTREMITY Right 4/9/2024    Procedure: INCISION AND DRAINAGE, LOWER EXTREMITY- FOOT & ANKLE;  Surgeon: Jimbo Montilla III, MD;  Location: [facility] OR;  Service: Orthopedics;  Laterality: Right;  CULTURES, VASCHE    INCISION AND DRAINAGE, LOWER EXTREMITY Right 5/21/2024    Procedure: INCISION AND DRAINAGE, LOWER EXTREMITY;  Surgeon: Jimbo Montilla III, MD;  Location: [facility] OR;  Service: Orthopedics;  Laterality: Right;  Pulsavac and Vashe    KNEE SURGERY      bilateral scope    WOUND DRESSING Right 4/9/2024    Procedure: WOUND VAC EXCHANGE;  Surgeon: Jimbo Montilla III,  MD;  Location: [facility] OR;  Service: Orthopedics;  Laterality: Right;       Review of patient's allergies indicates:   Allergen Reactions    Ace inhibitors      Hyperkalemia 8/2018  Other reaction(s): Unknown    Penicillins Hives     Tolerated cefepime and cefdinir previously    Tizanidine      Felt hot       No current facility-administered medications on file prior to encounter.     Current Outpatient Medications on File Prior to Encounter   Medication Sig    apixaban (ELIQUIS) 5 mg Tab Take 1 tablet (5 mg total) by mouth 2 (two) times daily.    aspirin 81 MG Chew Take 81 mg by mouth once daily.    atorvastatin (LIPITOR) 80 MG tablet Take 1 tablet (80 mg total) by mouth once daily.    ezetimibe (ZETIA) 10 mg tablet Take 1 tablet (10 mg total) by mouth once daily. Continue taking Atorvastatin    ferrous gluconate (FERGON) 324 MG tablet Take 1 tablet (324 mg total) by mouth daily with breakfast.    finasteride (PROSCAR) 5 mg tablet Take 1 tablet (5 mg total) by mouth once daily.    pantoprazole (PROTONIX) 40 MG tablet Take 1 tablet (40 mg total) by mouth 2 (two) times daily.    RENVELA 800 mg Tab Take 1 tablet (800 mg total) by mouth 3 (three) times daily with meals.    tamsulosin (FLOMAX) 0.4 mg Cap Take 2 capsules (0.8 mg total) by mouth once daily.    vitamin renal formula, B-complex-vitamin c-folic acid, (NEPHROCAP) 1 mg Cap Take 1 capsule by mouth once daily.     Family History       Problem Relation (Age of Onset)    Cataracts Mother    Diabetes Mother    Heart disease Mother    Hypertension Mother, Sister    No Known Problems Father, Brother, Maternal Aunt, Maternal Uncle, Paternal Aunt, Paternal Uncle, Maternal Grandmother, Maternal Grandfather, Paternal Grandmother, Paternal Grandfather, Daughter, Other          Tobacco Use    Smoking status: Never    Smokeless tobacco: Never   Substance and Sexual Activity    Alcohol use: No    Drug use: No    Sexual activity: Not on file     Review of Systems  Objective:      Vital Signs (Most Recent):  Temp: 97.9 °F (36.6 °C) (02/15/25 1200)  Pulse: 85 (02/15/25 1500)  Resp: (!) 25 (02/15/25 1500)  BP: (!) 117/58 (02/15/25 1300)  SpO2: 100 % (02/15/25 1500) Vital Signs (24h Range):  Temp:  [97.9 °F (36.6 °C)] 97.9 °F (36.6 °C)  Pulse:  [85-96] 85  Resp:  [16-35] 25  SpO2:  [90 %-100 %] 100 %  BP: (109-117)/(51-58) 117/58     Weight: 72.6 kg (160 lb)  Body mass index is 24.33 kg/m².     Physical Exam  Vitals and nursing note reviewed.   Constitutional:       General: He is not in acute distress.  Cardiovascular:      Rate and Rhythm: Normal rate and regular rhythm.      Pulses: Normal pulses.   Pulmonary:      Effort: Pulmonary effort is normal.   Abdominal:      General: Bowel sounds are normal. There is no distension.   Musculoskeletal:         General: No swelling.      Comments: Pain to palpation RLE and with any movement. Wounds dressed to RLE foot.   Neurological:      Mental Status: He is alert and oriented to person, place, and time. Mental status is at baseline.   Psychiatric:         Mood and Affect: Mood normal.         Thought Content: Thought content normal.                Significant Labs: All pertinent labs within the past 24 hours have been reviewed.    Significant Imaging: I have reviewed all pertinent imaging results/findings within the past 24 hours.  Assessment/Plan:     * Closed fracture of right hip  Patient presents with a fall, subsequent right femoral neck fracture.  Orthopedic surgery consulted, plan for hip replacement on 02/17/2025.  Continue with supportive care and pain control.  NPO at midnight Sunday.      Wounds, multiple  Wound care at home for bilateral lower extremities.  Mainly right lower extremity, wound care comes Tuesdays and Thursdays.  We will consult Wound Care for further management.      Paroxysmal atrial fibrillation  Patient has paroxysmal (<7 days) atrial fibrillation. Patient is currently in sinus rhythm. [id number] Score: 3. The  "patients heart rate in the last 24 hours is as follows:  Pulse  Min: 85  Max: 96     Antiarrhythmics       Anticoagulants  heparin 25,000 units in dextrose 5% (100 units/ml) IV bolus from bag LOW INTENSITY nomogram - [facility], Once, Intravenous  heparin 25,000 units in dextrose 5% 250 mL (100 units/mL) infusion LOW INTENSITY nomogram - [facility], Continuous, Intravenous  heparin 25,000 units in dextrose 5% (100 units/ml) IV bolus from bag LOW INTENSITY nomogram - [facility], As needed (PRN), Intravenous  heparin 25,000 units in dextrose 5% (100 units/ml) IV bolus from bag LOW INTENSITY nomogram - [facility], As needed (PRN), Intravenous    Plan  - Replete lytes with a goal of K>4, Mg >2  - Patient is anticoagulated, see medications listed above.  - Patient's afib is currently controlled          Chronic deep vein thrombosis (DVT) of popliteal vein of right lower extremity 9/21/20 outside [place]; unprovoked; anticoagulation  Patient on Eliquis at home.  Transitioned to heparin prior to surgery.      DM type 2 without retinopathy  Patient's FSGs are controlled on current medication regimen.  Last A1c reviewed-   Lab Results   Component Value Date    HGBA1C 5.8 12/09/2024     Most recent fingerstick glucose reviewed- No results for input(s): "POCTGLUCOSE" in the last 24 hours.  Current correctional scale   none    ESRD (end stage renal disease)  Creatine stable for now. BMP reviewed- noted Estimated Creatinine Clearance: 7.7 mL/min (A) (based on SCr of 8.6 mg/dL (H)). according to latest data. Based on current GFR, CKD stage is end stage.  Monitor UOP and serial BMP and adjust therapy as needed. Renally dose meds. Avoid nephrotoxic medications and procedures.    Dialysis Monday Wednesday Friday.  Has left arm fistula.  Nephrology consulted.    Hemiplegia and hemiparesis following cerebral infarction affecting right dominant side  Noted      BPH (benign prostatic hyperplasia) 2/18/21 prostate bx normal  Resume home medications        VTE Risk " Mitigation (From admission, onward)           Ordered     heparin 25,000 units in dextrose 5% (100 units/ml) IV bolus from bag LOW INTENSITY nomogram - [facility]  As needed (PRN)        Question:  Heparin Infusion Adjustment (DO NOT MODIFY ANSWER)  Answer:  [email or web address]\epic\Images\Pharmacy\HeparinInfusions\heparin LOW INTENSITY nomogram for [facility] [id number].pdf    02/15/25 1525     heparin 25,000 units in dextrose 5% (100 units/ml) IV bolus from bag LOW INTENSITY nomogram - [facility]  As needed (PRN)        Question:  Heparin Infusion Adjustment (DO NOT MODIFY ANSWER)  Answer:  [email or web address]\[email or web address]\Images\Pharmacy\HeparinInfusions\heparin LOW INTENSITY nomogram for [facility] [id number].pdf    02/15/25 1525     heparin 25,000 units in dextrose 5% (100 units/ml) IV bolus from bag LOW INTENSITY nomogram - [facility]  Once        Question:  Heparin Infusion Adjustment (DO NOT MODIFY ANSWER)  Answer:  [email or web address]\epic\Images\Pharmacy\HeparinInfusions\heparin LOW INTENSITY nomogram for [facility] [id number].pdf    02/15/25 1525     heparin 25,000 units in dextrose 5% 250 mL (100 units/mL) infusion LOW INTENSITY nomogram - [facility]  Continuous        Question:  Begin at (units/kg/hr)  Answer:  12    02/15/25 1525     IP VTE HIGH RISK PATIENT  Once         02/15/25 1525     Place sequential compression device  Until discontinued         02/15/25 1525                                    Bonifacio Reyes MD  Department of Hospital Medicine  [facility] - Emergency Dept

## 2025-02-15 NOTE — HPI
Is a 69-year-old male with history of CVA, hypertension, ESRD on HD, DVT on Eliquis who presents to the emergency department for evaluation after a fall.  Patient reports he gets around with a walker and he slipped and fell to his right hip.  Denies loss of consciousness, hitting of his head or other recent issues.  In the ED, Xray of right hip positive for acute right femoral neck fracture. CT confirmed. Orthopedic Surgery consulted. Plan for OR on 2/17/25. He will be admitted to hospital medicine for further management.

## 2025-02-15 NOTE — PHARMACY MED REC
"Admission Medication History     The home medication history was taken by Mandy Badillo.    You may go to "Admission" then "Reconcile Home Medications" tabs to review and/or act upon these items.     The home medication list has been updated by the Pharmacy department.   Please read ALL comments highlighted in yellow.   Please address this information as you see fit.    Feel free to contact us if you have any questions or require assistance.      Medications listed below were obtained from: [employer]  (Not in a hospital admission)          Mandy Badillo  [phone]                 .          "

## 2025-02-15 NOTE — SUBJECTIVE & OBJECTIVE
Past Medical History:   Diagnosis Date    Allergy     Clotting disorder     Eliquis and Plavix    Deep vein thrombosis     Diabetes mellitus, type 2     Hypertension     Nuclear sclerosis of both eyes 06/09/2017    Renal disorder     Seizures     Stroke     Urinary tract infection        Past Surgical History:   Procedure Laterality Date    CATARACT EXTRACTION W/  INTRAOCULAR LENS IMPLANT Right 10/05/2017    Dr. Tay    CATARACT EXTRACTION W/  INTRAOCULAR LENS IMPLANT Left 10/19/2017    Dr. Tay    CYSTOSCOPY W/ RETROGRADES Bilateral 2/18/2021    Procedure: CYSTOSCOPY, WITH RETROGRADE PYELOGRAM;  Surgeon: JEMMA Styles MD;  Location: [facility] OR;  Service: Urology;  Laterality: Bilateral;  REQUESTING EARLY AS POSSIBE-LO / 2/8/2021 @ 1:13PM  RN Pre Op 2-11-21, Covid NEGATIVE ON  2-17-21.  C A    ESOPHAGOGASTRODUODENOSCOPY N/A 8/17/2020    Procedure: EGD (ESOPHAGOGASTRODUODENOSCOPY);  Surgeon: Desmond Chapman MD;  Location: [facility] ENDO;  Service: Endoscopy;  Laterality: N/A;    ESOPHAGOGASTRODUODENOSCOPY N/A 8/21/2024    Procedure: EGD (ESOPHAGOGASTRODUODENOSCOPY);  Surgeon: Tonie Chauhan MD;  Location: [facility] ENDO;  Service: Endoscopy;  Laterality: N/A;    ESOPHAGOGASTRODUODENOSCOPY N/A 12/5/2024    Procedure: EGD (ESOPHAGOGASTRODUODENOSCOPY);  Surgeon: Tonie Chauhan MD;  Location: [facility] ENDO;  Service: Endoscopy;  Laterality: N/A;  Bren Ovalles, standard prep, mailed , Eliquis, Dialysis LAB, ASam  ok to hold Eliquis 2 days per Dr Raymond-GT  11/8/24- pc complete. DBM  11/13 pt r/s, Eliquis hold x 2 days per Dr. Raymond see t/e 10/29/24, Dialysis MWF, K+ labs in AM, mailed -ml  11/27 precall com    EYE SURGERY Bilateral     cataract    FEMORAL ARTERY STENT      FRACTURE SURGERY      INCISION AND DRAINAGE, LOWER EXTREMITY Right 4/4/2024    Procedure: INCISION AND DRAINAGE, LOWER EXTREMITY;  Surgeon: Jimbo Montilla III, MD;  Location: [facility] OR;  Service: Orthopedics;  Laterality: Right;    INCISION AND  DRAINAGE, LOWER EXTREMITY Right 4/6/2024    Procedure: INCISION AND DRAINAGE, LOWER EXTREMITY;  Surgeon: Desmond Barillas MD;  Location: [facility] OR;  Service: Orthopedics;  Laterality: Right;  foot and ankle    INCISION AND DRAINAGE, LOWER EXTREMITY Right 4/9/2024    Procedure: INCISION AND DRAINAGE, LOWER EXTREMITY- FOOT & ANKLE;  Surgeon: Jimbo Montilla III, MD;  Location: [facility] OR;  Service: Orthopedics;  Laterality: Right;  CULTURES, VASCHE    INCISION AND DRAINAGE, LOWER EXTREMITY Right 5/21/2024    Procedure: INCISION AND DRAINAGE, LOWER EXTREMITY;  Surgeon: Jimbo Montilla III, MD;  Location: [facility] OR;  Service: Orthopedics;  Laterality: Right;  Pulsavac and Vashe    KNEE SURGERY      bilateral scope    WOUND DRESSING Right 4/9/2024    Procedure: WOUND VAC EXCHANGE;  Surgeon: Jimbo Montilla III, MD;  Location: [facility] OR;  Service: Orthopedics;  Laterality: Right;       Review of patient's allergies indicates:   Allergen Reactions    Ace inhibitors      Hyperkalemia 8/2018  Other reaction(s): Unknown    Penicillins Hives     Tolerated cefepime and cefdinir previously    Tizanidine      Felt hot       No current facility-administered medications on file prior to encounter.     Current Outpatient Medications on File Prior to Encounter   Medication Sig    apixaban (ELIQUIS) 5 mg Tab Take 1 tablet (5 mg total) by mouth 2 (two) times daily.    aspirin 81 MG Chew Take 81 mg by mouth once daily.    atorvastatin (LIPITOR) 80 MG tablet Take 1 tablet (80 mg total) by mouth once daily.    ezetimibe (ZETIA) 10 mg tablet Take 1 tablet (10 mg total) by mouth once daily. Continue taking Atorvastatin    ferrous gluconate (FERGON) 324 MG tablet Take 1 tablet (324 mg total) by mouth daily with breakfast.    finasteride (PROSCAR) 5 mg tablet Take 1 tablet (5 mg total) by mouth once daily.    pantoprazole (PROTONIX) 40 MG tablet Take 1 tablet (40 mg total) by mouth 2 (two) times daily.    RENVELA 800 mg Tab Take 1 tablet (800  mg total) by mouth 3 (three) times daily with meals.    tamsulosin (FLOMAX) 0.4 mg Cap Take 2 capsules (0.8 mg total) by mouth once daily.    vitamin renal formula, B-complex-vitamin c-folic acid, (NEPHROCAP) 1 mg Cap Take 1 capsule by mouth once daily.     Family History       Problem Relation (Age of Onset)    Cataracts Mother    Diabetes Mother    Heart disease Mother    Hypertension Mother, Sister    No Known Problems Father, Brother, Maternal Aunt, Maternal Uncle, Paternal Aunt, Paternal Uncle, Maternal Grandmother, Maternal Grandfather, Paternal Grandmother, Paternal Grandfather, Daughter, Other          Tobacco Use    Smoking status: Never    Smokeless tobacco: Never   Substance and Sexual Activity    Alcohol use: No    Drug use: No    Sexual activity: Not on file     Review of Systems  Objective:     Vital Signs (Most Recent):  Temp: 97.9 °F (36.6 °C) (02/15/25 1200)  Pulse: 85 (02/15/25 1500)  Resp: (!) 25 (02/15/25 1500)  BP: (!) 117/58 (02/15/25 1300)  SpO2: 100 % (02/15/25 1500) Vital Signs (24h Range):  Temp:  [97.9 °F (36.6 °C)] 97.9 °F (36.6 °C)  Pulse:  [85-96] 85  Resp:  [16-35] 25  SpO2:  [90 %-100 %] 100 %  BP: (109-117)/(51-58) 117/58     Weight: 72.6 kg (160 lb)  Body mass index is 24.33 kg/m².     Physical Exam  Vitals and nursing note reviewed.   Constitutional:       General: He is not in acute distress.  Cardiovascular:      Rate and Rhythm: Normal rate and regular rhythm.      Pulses: Normal pulses.   Pulmonary:      Effort: Pulmonary effort is normal.   Abdominal:      General: Bowel sounds are normal. There is no distension.   Musculoskeletal:         General: No swelling.      Comments: Pain to palpation RLE and with any movement. Wounds dressed to RLE foot.   Neurological:      Mental Status: He is alert and oriented to person, place, and time. Mental status is at baseline.   Psychiatric:         Mood and Affect: Mood normal.         Thought Content: Thought content normal.                 Significant Labs: All pertinent labs within the past 24 hours have been reviewed.    Significant Imaging: I have reviewed all pertinent imaging results/findings within the past 24 hours.

## 2025-02-15 NOTE — Clinical Note
Diagnosis: Pre-op exam [417972]   Reason for IP Medical Treatment  (Clinical interventions that can only be accomplished in the IP setting? ) :: Ej foote

## 2025-02-15 NOTE — ED NOTES
Patient presents to ED due to Fall at home, pt states he slipped while walking, complains of pain to right hip, pending xray.

## 2025-02-15 NOTE — ASSESSMENT & PLAN NOTE
Wound care at home for bilateral lower extremities.  Mainly right lower extremity, wound care comes Tuesdays and Thursdays.  We will consult Wound Care for further management.

## 2025-02-15 NOTE — ASSESSMENT & PLAN NOTE
Creatine stable for now. BMP reviewed- noted Estimated Creatinine Clearance: 7.7 mL/min (A) (based on SCr of 8.6 mg/dL (H)). according to latest data. Based on current GFR, CKD stage is end stage.  Monitor UOP and serial BMP and adjust therapy as needed. Renally dose meds. Avoid nephrotoxic medications and procedures.    Dialysis Monday Wednesday Friday.  Has left arm fistula.  Nephrology consulted.

## 2025-02-15 NOTE — ASSESSMENT & PLAN NOTE
Patient has paroxysmal (<7 days) atrial fibrillation. Patient is currently in sinus rhythm. [id number] Score: 3. The patients heart rate in the last 24 hours is as follows:  Pulse  Min: 85  Max: 96     Antiarrhythmics       Anticoagulants  heparin 25,000 units in dextrose 5% (100 units/ml) IV bolus from bag LOW INTENSITY nomogram - [facility], Once, Intravenous  heparin 25,000 units in dextrose 5% 250 mL (100 units/mL) infusion LOW INTENSITY nomogram - [facility], Continuous, Intravenous  heparin 25,000 units in dextrose 5% (100 units/ml) IV bolus from bag LOW INTENSITY nomogram - [facility], As needed (PRN), Intravenous  heparin 25,000 units in dextrose 5% (100 units/ml) IV bolus from bag LOW INTENSITY nomogram - [facility], As needed (PRN), Intravenous    Plan  - Replete lytes with a goal of K>4, Mg >2  - Patient is anticoagulated, see medications listed above.  - Patient's afib is currently controlled

## 2025-02-16 LAB
ANION GAP SERPL CALC-SCNC: 16 MMOL/L (ref 8–16)
APTT PPP: 32.2 SEC (ref 21–32)
APTT PPP: 39.2 SEC (ref 21–32)
APTT PPP: 39.5 SEC (ref 21–32)
BASOPHILS # BLD AUTO: 0.03 K/UL (ref 0–0.2)
BASOPHILS NFR BLD: 0.8 % (ref 0–1.9)
BUN SERPL-MCNC: 49 MG/DL (ref 8–23)
CALCIUM SERPL-MCNC: 8.5 MG/DL (ref 8.7–10.5)
CHLORIDE SERPL-SCNC: 97 MMOL/L (ref 95–110)
CO2 SERPL-SCNC: 24 MMOL/L (ref 23–29)
CREAT SERPL-MCNC: 10 MG/DL (ref 0.5–1.4)
DIFFERENTIAL METHOD BLD: ABNORMAL
EOSINOPHIL # BLD AUTO: 0.1 K/UL (ref 0–0.5)
EOSINOPHIL NFR BLD: 2.4 % (ref 0–8)
ERYTHROCYTE [DISTWIDTH] IN BLOOD BY AUTOMATED COUNT: 14.4 % (ref 11.5–14.5)
EST. GFR  (NO RACE VARIABLE): 5 ML/MIN/1.73 M^2
GLUCOSE SERPL-MCNC: 107 MG/DL (ref 70–110)
HCT VFR BLD AUTO: 31.8 % (ref 40–54)
HGB BLD-MCNC: 10.6 G/DL (ref 14–18)
IMM GRANULOCYTES # BLD AUTO: 0.01 K/UL (ref 0–0.04)
IMM GRANULOCYTES NFR BLD AUTO: 0.3 % (ref 0–0.5)
LYMPHOCYTES # BLD AUTO: 1.2 K/UL (ref 1–4.8)
LYMPHOCYTES NFR BLD: 31 % (ref 18–48)
MAGNESIUM SERPL-MCNC: 2.1 MG/DL (ref 1.6–2.6)
MCH RBC QN AUTO: 27.1 PG (ref 27–31)
MCHC RBC AUTO-ENTMCNC: 33.3 G/DL (ref 32–36)
MCV RBC AUTO: 81 FL (ref 82–98)
MONOCYTES # BLD AUTO: 0.4 K/UL (ref 0.3–1)
MONOCYTES NFR BLD: 10.6 % (ref 4–15)
NEUTROPHILS # BLD AUTO: 2.1 K/UL (ref 1.8–7.7)
NEUTROPHILS NFR BLD: 54.9 % (ref 38–73)
NRBC BLD-RTO: 0 /100 WBC
PHOSPHATE SERPL-MCNC: 6.2 MG/DL (ref 2.7–4.5)
PLATELET # BLD AUTO: 148 K/UL (ref 150–450)
PMV BLD AUTO: 9.8 FL (ref 9.2–12.9)
POTASSIUM SERPL-SCNC: 4.5 MMOL/L (ref 3.5–5.1)
RBC # BLD AUTO: 3.91 M/UL (ref 4.6–6.2)
SODIUM SERPL-SCNC: 137 MMOL/L (ref 136–145)
WBC # BLD AUTO: 3.77 K/UL (ref 3.9–12.7)

## 2025-02-16 PROCEDURE — 25000003 PHARM REV CODE 250: Mod: HCNC | Performed by: STUDENT IN AN ORGANIZED HEALTH CARE EDUCATION/TRAINING PROGRAM

## 2025-02-16 PROCEDURE — 99223 1ST HOSP IP/OBS HIGH 75: CPT | Mod: HCNC,57,, | Performed by: ORTHOPAEDIC SURGERY

## 2025-02-16 PROCEDURE — 84100 ASSAY OF PHOSPHORUS: CPT | Mod: HCNC | Performed by: STUDENT IN AN ORGANIZED HEALTH CARE EDUCATION/TRAINING PROGRAM

## 2025-02-16 PROCEDURE — 21400001 HC TELEMETRY ROOM: Mod: HCNC

## 2025-02-16 PROCEDURE — 80048 BASIC METABOLIC PNL TOTAL CA: CPT | Mod: HCNC | Performed by: STUDENT IN AN ORGANIZED HEALTH CARE EDUCATION/TRAINING PROGRAM

## 2025-02-16 PROCEDURE — 99223 1ST HOSP IP/OBS HIGH 75: CPT | Mod: HCNC,,, | Performed by: PHYSICIAN ASSISTANT

## 2025-02-16 PROCEDURE — 85025 COMPLETE CBC W/AUTO DIFF WBC: CPT | Mod: HCNC | Performed by: STUDENT IN AN ORGANIZED HEALTH CARE EDUCATION/TRAINING PROGRAM

## 2025-02-16 PROCEDURE — 85730 THROMBOPLASTIN TIME PARTIAL: CPT | Mod: 91,HCNC | Performed by: STUDENT IN AN ORGANIZED HEALTH CARE EDUCATION/TRAINING PROGRAM

## 2025-02-16 PROCEDURE — 63600175 PHARM REV CODE 636 W HCPCS: Mod: HCNC | Performed by: STUDENT IN AN ORGANIZED HEALTH CARE EDUCATION/TRAINING PROGRAM

## 2025-02-16 PROCEDURE — 36415 COLL VENOUS BLD VENIPUNCTURE: CPT | Mod: HCNC | Performed by: STUDENT IN AN ORGANIZED HEALTH CARE EDUCATION/TRAINING PROGRAM

## 2025-02-16 PROCEDURE — 83735 ASSAY OF MAGNESIUM: CPT | Mod: HCNC | Performed by: STUDENT IN AN ORGANIZED HEALTH CARE EDUCATION/TRAINING PROGRAM

## 2025-02-16 RX ADMIN — SEVELAMER CARBONATE 800 MG: 800 TABLET, FILM COATED ORAL at 12:02

## 2025-02-16 RX ADMIN — PANTOPRAZOLE SODIUM 40 MG: 40 TABLET, DELAYED RELEASE ORAL at 08:02

## 2025-02-16 RX ADMIN — SEVELAMER CARBONATE 800 MG: 800 TABLET, FILM COATED ORAL at 08:02

## 2025-02-16 RX ADMIN — Medication 324 MG: at 08:02

## 2025-02-16 RX ADMIN — HYDROCODONE BITARTRATE AND ACETAMINOPHEN 1 TABLET: 5; 325 TABLET ORAL at 09:02

## 2025-02-16 RX ADMIN — ATORVASTATIN CALCIUM 80 MG: 40 TABLET, FILM COATED ORAL at 08:02

## 2025-02-16 RX ADMIN — HEPARIN SODIUM 15 UNITS/KG/HR: 10000 INJECTION, SOLUTION INTRAVENOUS at 06:02

## 2025-02-16 RX ADMIN — ASPIRIN 81 MG CHEWABLE TABLET 81 MG: 81 TABLET CHEWABLE at 08:02

## 2025-02-16 RX ADMIN — HYDROCODONE BITARTRATE AND ACETAMINOPHEN 1 TABLET: 5; 325 TABLET ORAL at 05:02

## 2025-02-16 RX ADMIN — FINASTERIDE 5 MG: 5 TABLET, FILM COATED ORAL at 08:02

## 2025-02-16 RX ADMIN — Medication 1 CAPSULE: at 08:02

## 2025-02-16 RX ADMIN — OXYCODONE 10 MG: 5 TABLET ORAL at 03:02

## 2025-02-16 RX ADMIN — HYDROCODONE BITARTRATE AND ACETAMINOPHEN 1 TABLET: 5; 325 TABLET ORAL at 03:02

## 2025-02-16 RX ADMIN — TAMSULOSIN HYDROCHLORIDE 0.8 MG: 0.4 CAPSULE ORAL at 08:02

## 2025-02-16 RX ADMIN — EZETIMIBE 10 MG: 10 TABLET ORAL at 08:02

## 2025-02-16 RX ADMIN — OXYCODONE 10 MG: 5 TABLET ORAL at 10:02

## 2025-02-16 RX ADMIN — SEVELAMER CARBONATE 800 MG: 800 TABLET, FILM COATED ORAL at 04:02

## 2025-02-16 NOTE — NURSING
Patient arrived to floor via transporter from ED. Patient transferred to bed via x 3 person assist. Patient was oriented to room, information on whiteboard, and med regimen. Bed low, adequate lighting provided, side rails x 2 up, call bell within reach. Admission assessment completed. IVF, heparin sign off completed with 12 units/kg going. Patient c/o pain 6/10 to R leg. Care ongoing.     [facility] [place]  Nurses Note -- 4 Eyes      2/15/2025       Skin assessed on: Admit    --moisture assoc. Buttocks, pink noted, R plantar foot wound dressing C/D/I  [] No Pressure Injuries Present    []Prevention Measures Documented    [x] Yes LDA  for Pressure Injury Previously documented     [] Yes New Pressure Injury Discovered   [] LDA for New Pressure Injury Added      Attending RN:  Stephanie Barthelemy, RN     Second RN:  Fransico Schuster RN

## 2025-02-16 NOTE — NURSING
Pt AAO x 4, cooperative, and pleasant. Pt was able to make needs known during shift. VSS on RA. IV heparin cont per dr's orders. Current infusion 17 units/kg. APTT ordered. Pt had c/o pain during shift. Telemetry monitoring continued on box #[id number]. SR noted. No acute distress noted. Care ongoing.Bed locked and in lowest position. Pt free from falls during shift. Bedside table and call light in reach. Care ongoing.

## 2025-02-16 NOTE — HOSPITAL COURSE
Mr. Moore is a 69-year-old male who was admitted with a right femoral neck fracture.  Orthopedic surgery consulted.S/P right  hip replacement arthroplasty on 02/17/2025.  Nephrology consulted for dialysis.  Wound care is doing wound care on right plantar foot and left heel.  PT,OT ,plan for SNF.

## 2025-02-16 NOTE — PROGRESS NOTES
[facility] Medicine  Progress Note    Patient Name: Miguel Moore  MRN: [id number]  Patient Class: IP- Inpatient   Admission Date: 2/15/2025  Length of Stay: 1 days  Attending Physician: Bonifacio Reyes MD  Primary Care Provider: Raciel Raymond MD        Subjective     Principal Problem:Closed fracture of right hip        HPI:  Is a 69-year-old male with history of CVA, hypertension, ESRD on HD, DVT on Eliquis who presents to the emergency department for evaluation after a fall.  Patient reports he gets around with a walker and he slipped and fell to his right hip.  Denies loss of consciousness, hitting of his head or other recent issues.  In the ED, Xray of right hip positive for acute right femoral neck fracture. CT confirmed. Orthopedic Surgery consulted. Plan for OR on 2/17/25. He will be admitted to hospital medicine for further management.     Overview/Hospital Course:  Mr. Moore is a 69-year-old male who was admitted with a right femoral neck fracture.  Orthopedic surgery consulted.  Plan for hip replacement on 02/17/2025.  Nephrology consulted for dialysis.    Interval History: no acute issues, pain decently well controlled. NPO at midnight    Review of Systems  Objective:     Vital Signs (Most Recent):  Temp: 98.3 °F (36.8 °C) (02/16/25 1138)  Pulse: 70 (02/16/25 1138)  Resp: 19 (02/16/25 1138)  BP: 116/75 (02/16/25 1138)  SpO2: 97 % (02/16/25 1138) Vital Signs (24h Range):  Temp:  [98.3 °F (36.8 °C)-98.7 °F (37.1 °C)] 98.3 °F (36.8 °C)  Pulse:  [70-98] 70  Resp:  [14-35] 19  SpO2:  [89 %-100 %] 97 %  BP: (109-142)/(58-81) 116/75     Weight: 72.9 kg (160 lb 11.5 oz)  Body mass index is 24.44 kg/m².    Intake/Output Summary (Last 24 hours) at 2/16/2025 1234  Last data filed at 2/16/2025 0828  Gross per 24 hour   Intake 361.8 ml   Output 0 ml   Net 361.8 ml         Physical Exam  Vitals and nursing note reviewed.   Constitutional:       General: He is not in acute  distress.  Cardiovascular:      Rate and Rhythm: Normal rate and regular rhythm.      Pulses: Normal pulses.   Pulmonary:      Effort: Pulmonary effort is normal.   Abdominal:      General: Bowel sounds are normal. There is no distension.   Musculoskeletal:         General: No swelling.      Comments: Pain to palpation RLE and with any movement. Wounds dressed to RLE foot.   Neurological:      Mental Status: He is alert and oriented to person, place, and time. Mental status is at baseline.   Psychiatric:         Mood and Affect: Mood normal.         Thought Content: Thought content normal.             Significant Labs: All pertinent labs within the past 24 hours have been reviewed.    Significant Imaging: I have reviewed all pertinent imaging results/findings within the past 24 hours.    Assessment and Plan     * Closed fracture of right hip  Patient presents with a fall, subsequent right femoral neck fracture.  Orthopedic surgery consulted, plan for hip replacement on 02/17/2025.  Continue with supportive care and pain control.  NPO at midnight Sunday.      Wounds, multiple  Wound care at home for bilateral lower extremities.  Mainly right lower extremity, wound care comes Tuesdays and Thursdays.  We will consult Wound Care for further management.      Paroxysmal atrial fibrillation  Patient has paroxysmal (<7 days) atrial fibrillation. Patient is currently in sinus rhythm. [id number] Score: 3. The patients heart rate in the last 24 hours is as follows:  Pulse  Min: 85  Max: 96     Antiarrhythmics       Anticoagulants  heparin 25,000 units in dextrose 5% (100 units/ml) IV bolus from bag LOW INTENSITY nomogram - [facility], Once, Intravenous  heparin 25,000 units in dextrose 5% 250 mL (100 units/mL) infusion LOW INTENSITY nomogram - [facility], Continuous, Intravenous  heparin 25,000 units in dextrose 5% (100 units/ml) IV bolus from bag LOW INTENSITY nomogram - [facility], As needed (PRN), Intravenous  heparin 25,000 units in dextrose  "5% (100 units/ml) IV bolus from bag LOW INTENSITY nomogram - [facility], As needed (PRN), Intravenous    Plan  - Replete lytes with a goal of K>4, Mg >2  - Patient is anticoagulated, see medications listed above.  - Patient's afib is currently controlled          Chronic deep vein thrombosis (DVT) of popliteal vein of right lower extremity 9/21/20 outside [place]; unprovoked; anticoagulation  Patient on Eliquis at home.  Transitioned to heparin prior to surgery.      DM type 2 without retinopathy  Patient's FSGs are controlled on current medication regimen.  Last A1c reviewed-   Lab Results   Component Value Date    HGBA1C 5.8 12/09/2024     Most recent fingerstick glucose reviewed- No results for input(s): "POCTGLUCOSE" in the last 24 hours.  Current correctional scale   none    ESRD (end stage renal disease)  Creatine stable for now. BMP reviewed- noted Estimated Creatinine Clearance: 7.7 mL/min (A) (based on SCr of 8.6 mg/dL (H)). according to latest data. Based on current GFR, CKD stage is end stage.  Monitor UOP and serial BMP and adjust therapy as needed. Renally dose meds. Avoid nephrotoxic medications and procedures.    Dialysis Monday Wednesday Friday.  Has left arm fistula.  Nephrology consulted.    Hemiplegia and hemiparesis following cerebral infarction affecting right dominant side  Noted      BPH (benign prostatic hyperplasia) 2/18/21 prostate bx normal  Resume home medications        VTE Risk Mitigation (From admission, onward)           Ordered     heparin 25,000 units in dextrose 5% (100 units/ml) IV bolus from bag LOW INTENSITY nomogram - [facility]  As needed (PRN)        Question:  Heparin Infusion Adjustment (DO NOT MODIFY ANSWER)  Answer:  [email or web address]\Images\Pharmacy\HeparinInfusions\heparin LOW INTENSITY nomogram for [facility] [id number].pdf    02/15/25 0515     heparin 25,000 units in dextrose 5% (100 units/ml) IV bolus from bag LOW INTENSITY nomogram - [facility]  As needed (PRN)        Question:  Heparin Infusion " Adjustment (DO NOT MODIFY ANSWER)  Answer:  [email or web address]\epic\Images\Pharmacy\HeparinInfusions\heparin LOW INTENSITY nomogram for [facility] [id number].pdf    02/15/25 1525     heparin 25,000 units in dextrose 5% 250 mL (100 units/mL) infusion LOW INTENSITY nomogram - [facility]  Continuous        Question:  Begin at (units/kg/hr)  Answer:  12    02/15/25 1525     IP VTE HIGH RISK PATIENT  Once         02/15/25 1525     Place sequential compression device  Until discontinued         02/15/25 1525                    Discharge Planning   GARY:      Code Status: Full Code   Medical Readiness for Discharge Date:   Discharge Plan A: Home                        Bonifacio Reyes MD  Department of Hospital Medicine   [facility]

## 2025-02-16 NOTE — SUBJECTIVE & OBJECTIVE
Past Medical History:   Diagnosis Date    Allergy     Clotting disorder     Eliquis and Plavix    Deep vein thrombosis     Diabetes mellitus, type 2     Hypertension     Nuclear sclerosis of both eyes 06/09/2017    Renal disorder     Seizures     Stroke     Urinary tract infection        Past Surgical History:   Procedure Laterality Date    CATARACT EXTRACTION W/  INTRAOCULAR LENS IMPLANT Right 10/05/2017    Dr. Tay    CATARACT EXTRACTION W/  INTRAOCULAR LENS IMPLANT Left 10/19/2017    Dr. Tay    CYSTOSCOPY W/ RETROGRADES Bilateral 2/18/2021    Procedure: CYSTOSCOPY, WITH RETROGRADE PYELOGRAM;  Surgeon: JEMMA Styles MD;  Location: [facility] OR;  Service: Urology;  Laterality: Bilateral;  REQUESTING EARLY AS POSSIBE-LO / 2/8/2021 @ 1:13PM  RN Pre Op 2-11-21, Covid NEGATIVE ON  2-17-21.  C A    ESOPHAGOGASTRODUODENOSCOPY N/A 8/17/2020    Procedure: EGD (ESOPHAGOGASTRODUODENOSCOPY);  Surgeon: Desmond Chapman MD;  Location: [facility] ENDO;  Service: Endoscopy;  Laterality: N/A;    ESOPHAGOGASTRODUODENOSCOPY N/A 8/21/2024    Procedure: EGD (ESOPHAGOGASTRODUODENOSCOPY);  Surgeon: Tonie Chauhan MD;  Location: [facility] ENDO;  Service: Endoscopy;  Laterality: N/A;    ESOPHAGOGASTRODUODENOSCOPY N/A 12/5/2024    Procedure: EGD (ESOPHAGOGASTRODUODENOSCOPY);  Surgeon: Tonie Chauhan MD;  Location: [facility] ENDO;  Service: Endoscopy;  Laterality: N/A;  Bren Ovalles, standard prep, mailed , Eliquis, Dialysis LAB, ASam  ok to hold Eliquis 2 days per Dr Raymond-GT  11/8/24- pc complete. DBM  11/13 pt r/s, Eliquis hold x 2 days per Dr. Raymond see t/e 10/29/24, Dialysis MWF, K+ labs in AM, mailed -ml  11/27 precall com    EYE SURGERY Bilateral     cataract    FEMORAL ARTERY STENT      FRACTURE SURGERY      INCISION AND DRAINAGE, LOWER EXTREMITY Right 4/4/2024    Procedure: INCISION AND DRAINAGE, LOWER EXTREMITY;  Surgeon: Jimbo Montilla III, MD;  Location: [facility] OR;  Service: Orthopedics;  Laterality: Right;    INCISION AND  DRAINAGE, LOWER EXTREMITY Right 4/6/2024    Procedure: INCISION AND DRAINAGE, LOWER EXTREMITY;  Surgeon: Desmond Barillas MD;  Location: [facility] OR;  Service: Orthopedics;  Laterality: Right;  foot and ankle    INCISION AND DRAINAGE, LOWER EXTREMITY Right 4/9/2024    Procedure: INCISION AND DRAINAGE, LOWER EXTREMITY- FOOT & ANKLE;  Surgeon: Jimbo Montilla III, MD;  Location: [facility] OR;  Service: Orthopedics;  Laterality: Right;  CULTURES, VASCHE    INCISION AND DRAINAGE, LOWER EXTREMITY Right 5/21/2024    Procedure: INCISION AND DRAINAGE, LOWER EXTREMITY;  Surgeon: Jimbo Montilla III, MD;  Location: [facility] OR;  Service: Orthopedics;  Laterality: Right;  Pulsavac and Vashe    KNEE SURGERY      bilateral scope    WOUND DRESSING Right 4/9/2024    Procedure: WOUND VAC EXCHANGE;  Surgeon: Jibmo Montilla III, MD;  Location: [facility] OR;  Service: Orthopedics;  Laterality: Right;       Review of patient's allergies indicates:   Allergen Reactions    Ace inhibitors      Hyperkalemia 8/2018  Other reaction(s): Unknown    Penicillins Hives     Tolerated cefepime and cefdinir previously    Tizanidine      Felt hot     Current Facility-Administered Medications   Medication Frequency    acetaminophen tablet 650 mg Q4H PRN    aspirin chewable tablet 81 mg Daily    atorvastatin tablet 80 mg Daily    dextrose 50% injection 12.5 g PRN    dextrose 50% injection 25 g PRN    ezetimibe tablet 10 mg Daily    ferrous gluconate tablet 324 mg Daily with breakfast    finasteride tablet 5 mg Daily    glucagon (human recombinant) injection 1 mg PRN    glucose chewable tablet 16 g PRN    glucose chewable tablet 24 g PRN    heparin 25,000 units in dextrose 5% (100 units/ml) IV bolus from bag LOW INTENSITY nomogram - [facility] PRN    heparin 25,000 units in dextrose 5% (100 units/ml) IV bolus from bag LOW INTENSITY nomogram - [facility] PRN    heparin 25,000 units in dextrose 5% 250 mL (100 units/mL) infusion LOW INTENSITY nomogram - [facility] Continuous     HYDROcodone-acetaminophen 5-325 mg per tablet 1 tablet Q6H PRN    naloxone 0.4 mg/mL injection 0.02 mg PRN    ondansetron injection 4 mg Q8H PRN    oxyCODONE immediate release tablet 10 mg Q6H PRN    pantoprazole EC tablet 40 mg BID    sevelamer carbonate tablet 800 mg TID WM    sodium chloride 0.9% flush 10 mL Q12H PRN    tamsulosin 24 hr capsule 0.8 mg Daily    vitamin renal formula (B-complex-vitamin c-folic acid) 1 mg per capsule 1 capsule Daily     Family History       Problem Relation (Age of Onset)    Cataracts Mother    Diabetes Mother    Heart disease Mother    Hypertension Mother, Sister    No Known Problems Father, Brother, Maternal Aunt, Maternal Uncle, Paternal Aunt, Paternal Uncle, Maternal Grandmother, Maternal Grandfather, Paternal Grandmother, Paternal Grandfather, Daughter, Other          Tobacco Use    Smoking status: Never    Smokeless tobacco: Never   Substance and Sexual Activity    Alcohol use: No    Drug use: No    Sexual activity: Not on file     Review of Systems   Constitutional:  Negative for chills and fever.   HENT:  Negative for congestion and facial swelling.    Eyes:  Negative for photophobia.   Respiratory:  Negative for cough and shortness of breath.    Gastrointestinal:  Negative for abdominal pain and nausea.   Musculoskeletal:  Arthralgias: right hip pain.   Neurological:  Negative for headaches.   Hematological:  Negative for adenopathy.     Objective:     Vital Signs (Most Recent):  Temp: 98.3 °F (36.8 °C) (02/16/25 1138)  Pulse: 78 (02/16/25 1454)  Resp: 19 (02/16/25 1505)  BP: 116/75 (02/16/25 1138)  SpO2: 97 % (02/16/25 1138) Vital Signs (24h Range):  Temp:  [98.3 °F (36.8 °C)-98.7 °F (37.1 °C)] 98.3 °F (36.8 °C)  Pulse:  [70-98] 78  Resp:  [14-34] 19  SpO2:  [89 %-100 %] 97 %  BP: (109-142)/(59-81) 116/75     Weight: 72.9 kg (160 lb 11.5 oz) (02/15/25 2015)  Body mass index is 24.44 kg/m².  Body surface area is 1.87 meters squared.    No intake/output data recorded.      Physical Exam  Vitals and nursing note reviewed.   HENT:      Head: Normocephalic.      Nose: Nose normal.   Eyes:      Extraocular Movements: Extraocular movements intact.      Conjunctiva/sclera: Conjunctivae normal.      Pupils: Pupils are equal, round, and reactive to light.   Cardiovascular:      Rate and Rhythm: Normal rate.   Pulmonary:      Effort: Pulmonary effort is normal.   Abdominal:      Palpations: Abdomen is soft.   Musculoskeletal:      Cervical back: Normal range of motion.      Comments: + decrease rom right hip, + ttp   Skin:     Coloration: Skin is not jaundiced.   Neurological:      General: No focal deficit present.      Mental Status: He is alert.   Psychiatric:         Mood and Affect: Mood normal.          Significant Labs:  CBC:   Recent Labs   Lab 02/16/25  0532   WBC 3.77*   RBC 3.91*   HGB 10.6*   HCT 31.8*   *   MCV 81*   MCH 27.1   MCHC 33.3     CMP:   Recent Labs   Lab 02/15/25  1334 02/16/25  0532    107   CALCIUM 8.6* 8.5*   ALBUMIN 3.4*  --    PROT 7.2  --     137   K 4.8 4.5   CO2 24 24   CL 97 97   BUN 41* 49*   CREATININE 8.6* 10.0*   ALKPHOS 76  --    ALT <5*  --    AST 12  --    BILITOT 0.6  --      All labs within the past 24 hours have been reviewed.    Significant Imaging:  Labs: Reviewed

## 2025-02-16 NOTE — CONSULTS
NEW PATIENT ORTHOPAEDIC: HIP    PRIMARY CARE PHYSICIAN: Raciel Raymond MD   REFERRING PROVIDER: Self, Aaareferral  No address on file     ASSESSMENT & PLAN:    Impression:  Right Hip femoral neck fracture     Surgery:     Miguel Moore has radiograph and physical exam evidence of the aforementioned impression and wishes to pursue surgery. This patient appears to have sufficient symptoms to warrant surgical intervention and is an appropriate candidate for right Primary Total Hip Arthroplasty as evidenced by months of unsuccessful non-operative treatment as outlined in the HPI below and progressive symptoms.    We had a lengthy discussion regarding the risk and benefit of surgery, the alternatives, limitations and personnel involved. These included but were not limited to infection, persistent pain, instability, nerve injury, blood clots, dislocation, loosening, leg length inequality and medical complications. We also discussed the pre-operative course, surgery itself and rehabilitation.    Sania-operative blood management and transfusion issues were discussed, and options clearly outlined. The patient has consented to the use of the banked allogenic blood if medically necessary.    We will plan for use of Sofya Insigina/Trident 2 components, with NAEL assistance.     Tentative Surgery Date: 2/17/25    70 yo male with right femoral neck fracture after a fall. Has some medical comorbidities, history of CVA, residual right sided weakness. Also, well controlled diabetic but with foot wounds - do not appear acutely infected, has CKD. But by report does ambulate independently or uses a cane. I think he would benefit from surgical intervention and relative to age, think a total hip would be a better option than hemiarthroplasty. He is on eloquis, ideally would be off this for 2 days so anesthesia could consider a spinal and to reduce hematoma risk with larger surgery. I think doing case on Monday would be good target.  Will attempt to do with NAEL, have CT ordered. Can eat today but NPO after midnight. Hold eloquis.     He reports lack of ROM of hip and distal leg since fall and injury but was functioning previously. Has RUE contractures from previous stroke.      SUBJECTIVE    CHIEF COMPLAINT: Hip Pain    HPI:   Miguel Moore is a 69 y.o. male here for evaluation and management of right hip pain. There is a specific incident that brought about this pain. he has had progressive problems with the hip(s) starting 2 days ago and is progressing which is now interfering with activities which include: walking and functional household ADL's    Currently the pain in the joint is severe with activity.  The pain is constant and is located in groin and thigh.  The pain is described as severe and sharp. Relieving factors include rest.     They do report leg length inequality.     Miguel Moore has no additional complaints.     PROGRESSIVE SYMPTOMS:  Pain worsened by weight bearing    FUNCTIONAL STATUS:   Walk a block or two on level ground       REVIEW OF SYSTEMS:  PAIN ASSESSMENT:  See HPI.  MUSCULOSKELETAL: See HPI.  OTHER 10 point review of systems is negative except as stated in HPI above    PAST MEDICAL HISTORY   has a past medical history of Allergy, Clotting disorder, Deep vein thrombosis, Diabetes mellitus, type 2, Hypertension, Nuclear sclerosis of both eyes (06/09/2017), Renal disorder, Seizures, Stroke, and Urinary tract infection.     PAST SURGICAL HISTORY   has a past surgical history that includes Knee surgery; Femoral artery stent; Esophagogastroduodenoscopy (N/A, 8/17/2020); Eye surgery (Bilateral); Cystoscopy w/ retrogrades (Bilateral, 2/18/2021); Fracture surgery; Cataract extraction w/  intraocular lens implant (Right, 10/05/2017); Cataract extraction w/  intraocular lens implant (Left, 10/19/2017); incision and drainage, lower extremity (Right, 4/4/2024); incision and drainage, lower extremity (Right, 4/6/2024);  "incision and drainage, lower extremity (Right, 4/9/2024); Wound dressing (Right, 4/9/2024); incision and drainage, lower extremity (Right, 5/21/2024); Esophagogastroduodenoscopy (N/A, 8/21/2024); and Esophagogastroduodenoscopy (N/A, 12/5/2024).     FAMILY HISTORY  family history includes Cataracts in his mother; Diabetes in his mother; Heart disease in his mother; Hypertension in his mother and sister; No Known Problems in his brother, daughter, father, maternal aunt, maternal grandfather, maternal grandmother, maternal uncle, paternal aunt, paternal grandfather, paternal grandmother, paternal uncle, and another family member.     SOCIAL HISTORY   reports that he has never smoked. He has never used smokeless tobacco. He reports that he does not drink alcohol and does not use drugs.     ALLERGIES   Review of patient's allergies indicates:   Allergen Reactions    Ace inhibitors      Hyperkalemia 8/2018  Other reaction(s): Unknown    Penicillins Hives     Tolerated cefepime and cefdinir previously    Tizanidine      Felt hot        MEDICATIONS   Medications Ordered Prior to Encounter[1]       PHYSICAL EXAM   height is 5' 8" (1.727 m) and weight is 72.9 kg (160 lb 11.5 oz). His oral temperature is 98.4 °F (36.9 °C). His blood pressure is 121/79 and his pulse is 78. His respiration is 18 and oxygen saturation is 98%.   Body mass index is 24.44 kg/m².      All other systems deferred.  GENERAL:  No acute distress  HABITUS: Normal  SKIN: Normal . Small sacral ulcer. 2 small plantar foot wounds.     HIP EXAM:    right:   ROM:   Apparent leg length discrepancy    Palpation: Yes tenderness over Greater trochanter   Pain is reproduced with IR or ER of the hip  Strength: 1/5 ankle plantarflexion/dorsiflexion, 1/5 EHL  Neurovascular Status: Sensation intact to light touch in Sural, saphenous, SPN, DPN, Tibial nerve distribution  normal capillary refill, foot has normal warmth    DATA:  Diagnostic tests reviewed for today's visit: "     AP pelvis, hip, and lateral radiographs shows displaced right femoral neck fracture     CT Scan: CT images and report reviewed which demonstrate an acute traumatic fracture involving the right femoral neck with associated varus deformity. There is no hip dislocation. The bones are diffusely osteopenic.          [1]   No current facility-administered medications on file prior to encounter.     Current Outpatient Medications on File Prior to Encounter   Medication Sig Dispense Refill    apixaban (ELIQUIS) 5 mg Tab Take 1 tablet (5 mg total) by mouth 2 (two) times daily. 60 tablet 3    aspirin 81 MG Chew Take 81 mg by mouth once daily.      ezetimibe (ZETIA) 10 mg tablet Take 1 tablet (10 mg total) by mouth once daily. Continue taking Atorvastatin 90 tablet 3    pantoprazole (PROTONIX) 40 MG tablet Take 1 tablet (40 mg total) by mouth 2 (two) times daily. 180 tablet 3    RENVELA 800 mg Tab Take 1 tablet (800 mg total) by mouth 3 (three) times daily with meals. 90 tablet 0    tamsulosin (FLOMAX) 0.4 mg Cap Take 2 capsules (0.8 mg total) by mouth once daily. 180 capsule 3    vitamin renal formula, B-complex-vitamin c-folic acid, (NEPHROCAP) 1 mg Cap Take 1 capsule by mouth once daily. 30 capsule 11

## 2025-02-16 NOTE — PLAN OF CARE
Problem: Adult Inpatient Plan of Care  Goal: Plan of Care Review  Outcome: Progressing  Goal: Patient-Specific Goal (Individualized)  Outcome: Progressing  Goal: Absence of Hospital-Acquired Illness or Injury  Outcome: Progressing  Goal: Optimal Comfort and Wellbeing  Outcome: Progressing  Goal: Readiness for Transition of Care  Outcome: Progressing     Problem: Diabetes Comorbidity  Goal: Blood Glucose Level Within Targeted Range  Outcome: Progressing     Problem: Wound  Goal: Optimal Coping  Outcome: Progressing  Goal: Optimal Functional Ability  Outcome: Progressing  Goal: Absence of Infection Signs and Symptoms  Outcome: Progressing  Goal: Improved Oral Intake  Outcome: Progressing  Goal: Optimal Pain Control and Function  Outcome: Progressing  Goal: Skin Health and Integrity  Outcome: Progressing  Goal: Optimal Wound Healing  Outcome: Progressing     Problem: Fall Injury Risk  Goal: Absence of Fall and Fall-Related Injury  Outcome: Progressing     Problem: Skin Injury Risk Increased  Goal: Skin Health and Integrity  Outcome: Progressing     Problem: Hemodialysis  Goal: Safe, Effective Therapy Delivery  Outcome: Progressing  Goal: Effective Tissue Perfusion  Outcome: Progressing  Goal: Absence of Infection Signs and Symptoms  Outcome: Progressing

## 2025-02-16 NOTE — CONSULTS
[facility] Surg  Nephrology  Consult Note    Patient Name: Miguel Moore  MRN: [id number]  Admission Date: 2/15/2025  Hospital Length of Stay: 1 days  Attending Provider: Bonifacio Reyes MD   Primary Care Physician: Raciel Raymond MD  Principal Problem:Closed fracture of right hip    Inpatient consult to Nephrology  Consult performed by: Saranya Reid PA  Consult ordered by: Bonifacio Reyes MD  Reason for consult: hemodialysis        Subjective:     HPI: This is a 69 y.o. M with a PMHx of MI, seizures, T2DM, HTN, DVT , ESRD on HD presented to ED 2/15/24 via EMS with traumatic right hip pain s/p fall. Right hip xray + femoral neck fracture. Pt admitted for surgical management. Nephrology consulted for hemodialysis. Plan for right Primary Total Hip Arthroplasty 2/16/25    Prior records obtained and reviewed.  last HD session 2/14/25  Attending Nephrologist: CLEMENTE SHAHID MD  Dialysis Location: [facility] DIALYSIS  Schedule: M-W-F   Duration 3.5hrs  EDW 70.2kg  LUE AVF    Past Medical History:   Diagnosis Date    Allergy     Clotting disorder     Eliquis and Plavix    Deep vein thrombosis     Diabetes mellitus, type 2     Hypertension     Nuclear sclerosis of both eyes 06/09/2017    Renal disorder     Seizures     Stroke     Urinary tract infection        Past Surgical History:   Procedure Laterality Date    CATARACT EXTRACTION W/  INTRAOCULAR LENS IMPLANT Right 10/05/2017    Dr. Tay    CATARACT EXTRACTION W/  INTRAOCULAR LENS IMPLANT Left 10/19/2017    Dr. Tay    CYSTOSCOPY W/ RETROGRADES Bilateral 2/18/2021    Procedure: CYSTOSCOPY, WITH RETROGRADE PYELOGRAM;  Surgeon: JEMMA Styles MD;  Location: [facility];  Service: Urology;  Laterality: Bilateral;  REQUESTING EARLY AS POSSIBE-LO / 2/8/2021 @ 1:13PM  RN Pre Op 2-11-21, Covid NEGATIVE ON  2-17-21.  C A    ESOPHAGOGASTRODUODENOSCOPY N/A 8/17/2020    Procedure: EGD (ESOPHAGOGASTRODUODENOSCOPY);  Surgeon: Desmond Chapman MD;   Location: [facility] ENDO;  Service: Endoscopy;  Laterality: N/A;    ESOPHAGOGASTRODUODENOSCOPY N/A 8/21/2024    Procedure: EGD (ESOPHAGOGASTRODUODENOSCOPY);  Surgeon: Tonie Chauhan MD;  Location: [facility] ENDO;  Service: Endoscopy;  Laterality: N/A;    ESOPHAGOGASTRODUODENOSCOPY N/A 12/5/2024    Procedure: EGD (ESOPHAGOGASTRODUODENOSCOPY);  Surgeon: Tonie Chauhan MD;  Location: [facility] ENDO;  Service: Endoscopy;  Laterality: N/A;  Bren Citronelle, standard prep, mailed , Eliquis, Dialysis LAB, ASam  ok to hold Eliquis 2 days per Dr aRymond-GT  11/8/24- pc complete. DBM  11/13 pt r/s, Eliquis hold x 2 days per Dr. Raymond see t/e 10/29/24, Dialysis MWF, K+ labs in AM, mailed -ml  11/27 precall com    EYE SURGERY Bilateral     cataract    FEMORAL ARTERY STENT      FRACTURE SURGERY      INCISION AND DRAINAGE, LOWER EXTREMITY Right 4/4/2024    Procedure: INCISION AND DRAINAGE, LOWER EXTREMITY;  Surgeon: Jimbo Montilla III, MD;  Location: [facility] OR;  Service: Orthopedics;  Laterality: Right;    INCISION AND DRAINAGE, LOWER EXTREMITY Right 4/6/2024    Procedure: INCISION AND DRAINAGE, LOWER EXTREMITY;  Surgeon: Desmond Barillas MD;  Location: [facility] OR;  Service: Orthopedics;  Laterality: Right;  foot and ankle    INCISION AND DRAINAGE, LOWER EXTREMITY Right 4/9/2024    Procedure: INCISION AND DRAINAGE, LOWER EXTREMITY- FOOT & ANKLE;  Surgeon: Jimbo Montilla III, MD;  Location: [facility] OR;  Service: Orthopedics;  Laterality: Right;  CULTURES, VASCHE    INCISION AND DRAINAGE, LOWER EXTREMITY Right 5/21/2024    Procedure: INCISION AND DRAINAGE, LOWER EXTREMITY;  Surgeon: Jimbo Montilla III, MD;  Location: [facility] OR;  Service: Orthopedics;  Laterality: Right;  Pulsavac and Vashe    KNEE SURGERY      bilateral scope    WOUND DRESSING Right 4/9/2024    Procedure: WOUND VAC EXCHANGE;  Surgeon: Jimbo Montilla III, MD;  Location: [facility] OR;  Service: Orthopedics;  Laterality: Right;       Review of patient's allergies indicates:    Allergen Reactions    Ace inhibitors      Hyperkalemia 8/2018  Other reaction(s): Unknown    Penicillins Hives     Tolerated cefepime and cefdinir previously    Tizanidine      Felt hot     Current Facility-Administered Medications   Medication Frequency    acetaminophen tablet 650 mg Q4H PRN    aspirin chewable tablet 81 mg Daily    atorvastatin tablet 80 mg Daily    dextrose 50% injection 12.5 g PRN    dextrose 50% injection 25 g PRN    ezetimibe tablet 10 mg Daily    ferrous gluconate tablet 324 mg Daily with breakfast    finasteride tablet 5 mg Daily    glucagon (human recombinant) injection 1 mg PRN    glucose chewable tablet 16 g PRN    glucose chewable tablet 24 g PRN    heparin 25,000 units in dextrose 5% (100 units/ml) IV bolus from bag LOW INTENSITY nomogram - [facility] PRN    heparin 25,000 units in dextrose 5% (100 units/ml) IV bolus from bag LOW INTENSITY nomogram - [facility] PRN    heparin 25,000 units in dextrose 5% 250 mL (100 units/mL) infusion LOW INTENSITY nomogram - [facility] Continuous    HYDROcodone-acetaminophen 5-325 mg per tablet 1 tablet Q6H PRN    naloxone 0.4 mg/mL injection 0.02 mg PRN    ondansetron injection 4 mg Q8H PRN    oxyCODONE immediate release tablet 10 mg Q6H PRN    pantoprazole EC tablet 40 mg BID    sevelamer carbonate tablet 800 mg TID WM    sodium chloride 0.9% flush 10 mL Q12H PRN    tamsulosin 24 hr capsule 0.8 mg Daily    vitamin renal formula (B-complex-vitamin c-folic acid) 1 mg per capsule 1 capsule Daily     Family History       Problem Relation (Age of Onset)    Cataracts Mother    Diabetes Mother    Heart disease Mother    Hypertension Mother, Sister    No Known Problems Father, Brother, Maternal Aunt, Maternal Uncle, Paternal Aunt, Paternal Uncle, Maternal Grandmother, Maternal Grandfather, Paternal Grandmother, Paternal Grandfather, Daughter, Other          Tobacco Use    Smoking status: Never    Smokeless tobacco: Never   Substance and Sexual Activity    Alcohol use: No     Drug use: No    Sexual activity: Not on file     Review of Systems   Constitutional:  Negative for chills and fever.   HENT:  Negative for congestion and facial swelling.    Eyes:  Negative for photophobia.   Respiratory:  Negative for cough and shortness of breath.    Gastrointestinal:  Negative for abdominal pain and nausea.   Musculoskeletal:  Arthralgias: right hip pain.   Neurological:  Negative for headaches.   Hematological:  Negative for adenopathy.     Objective:     Vital Signs (Most Recent):  Temp: 98.3 °F (36.8 °C) (02/16/25 1138)  Pulse: 78 (02/16/25 1454)  Resp: 19 (02/16/25 1505)  BP: 116/75 (02/16/25 1138)  SpO2: 97 % (02/16/25 1138) Vital Signs (24h Range):  Temp:  [98.3 °F (36.8 °C)-98.7 °F (37.1 °C)] 98.3 °F (36.8 °C)  Pulse:  [70-98] 78  Resp:  [14-34] 19  SpO2:  [89 %-100 %] 97 %  BP: (109-142)/(59-81) 116/75     Weight: 72.9 kg (160 lb 11.5 oz) (02/15/25 2015)  Body mass index is 24.44 kg/m².  Body surface area is 1.87 meters squared.    No intake/output data recorded.     Physical Exam  Vitals and nursing note reviewed.   HENT:      Head: Normocephalic.      Nose: Nose normal.   Eyes:      Extraocular Movements: Extraocular movements intact.      Conjunctiva/sclera: Conjunctivae normal.      Pupils: Pupils are equal, round, and reactive to light.   Cardiovascular:      Rate and Rhythm: Normal rate.   Pulmonary:      Effort: Pulmonary effort is normal.   Abdominal:      Palpations: Abdomen is soft.   Musculoskeletal:      Cervical back: Normal range of motion.      Comments: + decrease rom right hip, + ttp   Skin:     Coloration: Skin is not jaundiced.   Neurological:      General: No focal deficit present.      Mental Status: He is alert.   Psychiatric:         Mood and Affect: Mood normal.          Significant Labs:  CBC:   Recent Labs   Lab 02/16/25  0532   WBC 3.77*   RBC 3.91*   HGB 10.6*   HCT 31.8*   *   MCV 81*   MCH 27.1   MCHC 33.3     CMP:   Recent Labs   Lab 02/15/25  0518  02/16/25  0532    107   CALCIUM 8.6* 8.5*   ALBUMIN 3.4*  --    PROT 7.2  --     137   K 4.8 4.5   CO2 24 24   CL 97 97   BUN 41* 49*   CREATININE 8.6* 10.0*   ALKPHOS 76  --    ALT <5*  --    AST 12  --    BILITOT 0.6  --      All labs within the past 24 hours have been reviewed.    Significant Imaging:  Labs: Reviewed    Assessment/Plan:     ESRD on HD  - HD MWF  - plan for HD tomorrow, UF 2.5L    Anemia of CKD  - Hgb 10.6, at goal  - will trend  - transfuse for hgb< 7    Secondary hyperparathyroidism  - CCa 9 acceptable  - Phos 6.2  - sevelamer 800mg tid        Thank you for your consult. I will follow-up with patient. Please contact us if you have any additional questions.    HANNAH Huynh  Nephrology  [facility] - [place], [place] - Med Surg

## 2025-02-16 NOTE — NURSING
[facility]  Nurses Note -- 4 Eyes      2/16/2025       Skin assessed on: Q Shift      [] No Pressure Injuries Present    []Prevention Measures Documented    [x] Yes LDA  for Pressure Injury Previously documented     [] Yes New Pressure Injury Discovered   [] LDA for New Pressure Injury Added      Attending RN:  Leslye Regalado LPN     Second RN:  DARRION Hills

## 2025-02-16 NOTE — CONSULTS
Ortho consulted. 70 yo male with right femoral neck fracture after a fall. Has some medical comorbidities, history of CVA, residual right sided weakness. Also, well controlled diabetic but with foot wounds, has CKD. But by report does ambulate independently or uses a walker. I think he would benefit from surgical intervention and relative to age, think a total hip would be a better option than hemiarthroplasty. He is on eloquis, ideally would be off this for 2 days so anesthesia could consider a spinal and to reduce hematoma risk with larger surgery. I think doing case on Monday would be good target. Will attempt to do with NAEL, have CT ordered. Can eat tomorrow but NPO after midnight. Hold eloquis. Will meet and consent patient tomorrow.

## 2025-02-16 NOTE — SUBJECTIVE & OBJECTIVE
Interval History: no acute issues, pain decently well controlled. NPO at midnight    Review of Systems  Objective:     Vital Signs (Most Recent):  Temp: 98.3 °F (36.8 °C) (02/16/25 1138)  Pulse: 70 (02/16/25 1138)  Resp: 19 (02/16/25 1138)  BP: 116/75 (02/16/25 1138)  SpO2: 97 % (02/16/25 1138) Vital Signs (24h Range):  Temp:  [98.3 °F (36.8 °C)-98.7 °F (37.1 °C)] 98.3 °F (36.8 °C)  Pulse:  [70-98] 70  Resp:  [14-35] 19  SpO2:  [89 %-100 %] 97 %  BP: (109-142)/(58-81) 116/75     Weight: 72.9 kg (160 lb 11.5 oz)  Body mass index is 24.44 kg/m².    Intake/Output Summary (Last 24 hours) at 2/16/2025 1234  Last data filed at 2/16/2025 0828  Gross per 24 hour   Intake 361.8 ml   Output 0 ml   Net 361.8 ml         Physical Exam  Vitals and nursing note reviewed.   Constitutional:       General: He is not in acute distress.  Cardiovascular:      Rate and Rhythm: Normal rate and regular rhythm.      Pulses: Normal pulses.   Pulmonary:      Effort: Pulmonary effort is normal.   Abdominal:      General: Bowel sounds are normal. There is no distension.   Musculoskeletal:         General: No swelling.      Comments: Pain to palpation RLE and with any movement. Wounds dressed to RLE foot.   Neurological:      Mental Status: He is alert and oriented to person, place, and time. Mental status is at baseline.   Psychiatric:         Mood and Affect: Mood normal.         Thought Content: Thought content normal.             Significant Labs: All pertinent labs within the past 24 hours have been reviewed.    Significant Imaging: I have reviewed all pertinent imaging results/findings within the past 24 hours.

## 2025-02-16 NOTE — NURSING
12:39PM aPTT was 39.2, which is therapeutic, so no change to heparin infusion. Heparin still infusing at 17 units/kg/hr. Next aPTT scheduled for today at 1925.

## 2025-02-16 NOTE — PLAN OF CARE
Problem: Adult Inpatient Plan of Care  Goal: Absence of Hospital-Acquired Illness or Injury  Outcome: Progressing  Intervention: Prevent Skin Injury  Flowsheets (Taken 2/16/2025 0438)  Body Position: weight shifting  Skin Protection: transparent dressing maintained  Device Skin Pressure Protection: tubing/devices free from skin contact  Intervention: Prevent and Manage VTE (Venous Thromboembolism) Risk  Flowsheets (Taken 2/16/2025 0438)  VTE Prevention/Management:   bleeding precautions maintained   bleeding risk assessed  Intervention: Prevent Infection  Flowsheets (Taken 2/16/2025 0438)  Infection Prevention:   cohorting utilized   environmental surveillance performed   hand hygiene promoted   single patient room provided  Goal: Optimal Comfort and Wellbeing  Outcome: Progressing  Intervention: Monitor Pain and Promote Comfort  Flowsheets (Taken 2/16/2025 0438)  Pain Management Interventions:   medication offered   pain management plan reviewed with patient/caregiver   quiet environment facilitated   relaxation techniques promoted   position adjusted  Intervention: Provide Person-Centered Care  Flowsheets (Taken 2/16/2025 0438)  Trust Relationship/Rapport:   care explained   choices provided   empathic listening provided   questions answered   questions encouraged   reassurance provided   thoughts/feelings acknowledged     Problem: Diabetes Comorbidity  Goal: Blood Glucose Level Within Targeted Range  Outcome: Progressing  Intervention: Monitor and Manage Glycemia  Flowsheets (Taken 2/16/2025 0438)  Glycemic Management: blood glucose monitored     Problem: Fall Injury Risk  Goal: Absence of Fall and Fall-Related Injury  Outcome: Progressing  Intervention: Identify and Manage Contributors  Flowsheets (Taken 2/16/2025 0438)  Medication Review/Management: medications reviewed  Intervention: Promote Injury-Free Environment  Flowsheets (Taken 2/16/2025 0438)  Safety Promotion/Fall Prevention:   assistive  device/personal item within reach   bed alarm set   lighting adjusted   medications reviewed   nonskid shoes/socks when out of bed   room near unit station   side rails raised x 2     Problem: Skin Injury Risk Increased  Goal: Skin Health and Integrity  Outcome: Progressing  Intervention: Optimize Skin Protection  Flowsheets (Taken 2/16/2025 0806)  Pressure Reduction Techniques: weight shift assistance provided  Skin Protection: transparent dressing maintained  Head of Bed (HOB) Positioning: HOB elevated

## 2025-02-16 NOTE — HPI
This is a 69 y.o. M with a PMHx of MI, seizures, T2DM, HTN, DVT , ESRD on HD presented to ED 2/15/24 via EMS with traumatic right hip pain s/p fall. Right hip xray + femoral neck fracture. Pt admitted for surgical management. Nephrology consulted for hemodialysis. Plan for right Primary Total Hip Arthroplasty 2/16/25    Prior records obtained and reviewed.  last HD session 2/14/25  Attending Nephrologist: CLEMENTE SHAHID MD  Dialysis Location: [facility] DIALYSIS  Schedule: M-W-F   Duration 3.5hrs  EDW 70.2kg  LUE AVF

## 2025-02-16 NOTE — ED NOTES
Wound noted to right plantar foot, left heel callous, healed ulcer noted to sacrum, pt has fistula to left upper arm.

## 2025-02-16 NOTE — PLAN OF CARE
Case Management Assessment     PCP: Raciel Raymond MD  Pharmacy:   [facility] - [place] - [address]  [address] [facility]  [place] [address]  Phone: [phone] Fax: [phone]    [facility] - [place] - [address]  [address]  [place] [address]  Phone: [phone] Fax: [phone]    [facility] [address] [facility]  [address]   [place] [address]  Phone: [phone] Fax: [phone]     Patient Arrived From: Home  Existing Help at Home: Daughter Maddy and granddaughter    Barriers to Discharge: Transportation    Discharge Plan:    A. Home   B. Home with family      CM met with pt at [facility] to discuss discharge planning. Pt resides with his daughter and granddaughter and uses a wheelchair and walker at home for mobility. Pt states that he is independent and will have his daughter or granddaughter to get him or he said he will need an ambulance ride. Pt attends dialysis at [facility] on [address]. PT has had Wound Care with [facility]. CM will continue to follow-up for discharge needs.    02/16/25 1045   Discharge Assessment   Assessment Type Discharge Planning Assessment   Confirmed/corrected address, phone number and insurance Yes   Confirmed Demographics Correct on Facesheet   Source of Information patient   Communicated GARY with patient/caregiver Date not available/Unable to determine   Reason For Admission Closed fracture of right hip   People in Home child(hannha), adult;grandchild(hannah)   Facility Arrived From: Home   Do you expect to return to your current living situation? Yes   Do you have help at home or someone to help you manage your care at home? Yes   Who are your caregiver(s) and their phone number(s)? Petyon Castañeda [phone]   Current cognitive status: Alert/Oriented   Walking or Climbing Stairs Difficulty yes   Walking or Climbing Stairs ambulation difficulty, requires equipment   Mobility Management Walker, Wheelchair    Dressing/Bathing Difficulty yes   Dressing/Bathing bathing difficulty, assistance 1 person   Dressing/Bathing Management Daughter helps bathe him   Home Accessibility wheelchair accessible   Equipment Currently Used at Home wheelchair;walker, standard   Readmission within 30 days? No   Patient currently being followed by outpatient case management? No   Do you currently have service(s) that help you manage your care at home? Yes   Name and Contact number of agency Wound Care- [facility]   Is the pt/caregiver preference to resume services with current agency Yes   Do you take prescription medications? Yes   Do you have prescription coverage? Yes   Coverage [employer] Managed Medicare   Do you have any problems affording any of your prescribed medications? No   Is the patient taking medications as prescribed? yes   Who is going to help you get home at discharge? Daughter, Granddaughter, Ambulance   How do you get to doctors appointments? family or friend will provide;health plan transportation   Are you on dialysis? Yes   Dialysis Name and Scheduled days MWF [facility] [place]   Do you take coumadin? No   Discharge Plan A Home   Discharge Plan B Home with family   DME Needed Upon Discharge  other (see comments)  (TBD)   Discharge Plan discussed with: Patient   Transition of Care Barriers Transportation   Financial Resource Strain   How hard is it for you to pay for the very basics like food, housing, medical care, and heating? Not hard   Housing Stability   In the last 12 months, was there a time when you were not able to pay the mortgage or rent on time? N   At any time in the past 12 months, were you homeless or living in a shelter (including now)? N   Transportation Needs   Has the lack of transportation kept you from medical appointments, meetings, work or from getting things needed for daily living? No   Food Insecurity   Within the past 12 months, you worried that your food would run out before you got the money to  buy more. Never true   Within the past 12 months, the food you bought just didn't last and you didn't have money to get more. Never true   Alcohol Use   Q1: How often do you have a drink containing alcohol? Never   Q2: How many drinks containing alcohol do you have on a typical day when you are drinking? None

## 2025-02-17 ENCOUNTER — ANESTHESIA (OUTPATIENT)
Dept: SURGERY | Facility: HOSPITAL | Age: 70
End: 2025-02-17
Payer: MEDICARE

## 2025-02-17 ENCOUNTER — ANESTHESIA EVENT (OUTPATIENT)
Dept: SURGERY | Facility: HOSPITAL | Age: 70
End: 2025-02-17
Payer: MEDICARE

## 2025-02-17 LAB
ANION GAP SERPL CALC-SCNC: 18 MMOL/L (ref 8–16)
APTT PPP: 60.2 SEC (ref 21–32)
BASOPHILS # BLD AUTO: 0.03 K/UL (ref 0–0.2)
BASOPHILS NFR BLD: 0.6 % (ref 0–1.9)
BUN SERPL-MCNC: 60 MG/DL (ref 8–23)
CALCIUM SERPL-MCNC: 8.5 MG/DL (ref 8.7–10.5)
CHLORIDE SERPL-SCNC: 94 MMOL/L (ref 95–110)
CO2 SERPL-SCNC: 22 MMOL/L (ref 23–29)
CREAT SERPL-MCNC: 12.2 MG/DL (ref 0.5–1.4)
DIFFERENTIAL METHOD BLD: ABNORMAL
EOSINOPHIL # BLD AUTO: 0.1 K/UL (ref 0–0.5)
EOSINOPHIL NFR BLD: 2.1 % (ref 0–8)
ERYTHROCYTE [DISTWIDTH] IN BLOOD BY AUTOMATED COUNT: 14 % (ref 11.5–14.5)
EST. GFR  (NO RACE VARIABLE): 4 ML/MIN/1.73 M^2
GLUCOSE SERPL-MCNC: 136 MG/DL (ref 70–110)
HCT VFR BLD AUTO: 32.9 % (ref 40–54)
HGB BLD-MCNC: 10.9 G/DL (ref 14–18)
IMM GRANULOCYTES # BLD AUTO: 0.01 K/UL (ref 0–0.04)
IMM GRANULOCYTES NFR BLD AUTO: 0.2 % (ref 0–0.5)
LYMPHOCYTES # BLD AUTO: 1.1 K/UL (ref 1–4.8)
LYMPHOCYTES NFR BLD: 23.1 % (ref 18–48)
MAGNESIUM SERPL-MCNC: 2.1 MG/DL (ref 1.6–2.6)
MCH RBC QN AUTO: 26.7 PG (ref 27–31)
MCHC RBC AUTO-ENTMCNC: 33.1 G/DL (ref 32–36)
MCV RBC AUTO: 80 FL (ref 82–98)
MONOCYTES # BLD AUTO: 0.4 K/UL (ref 0.3–1)
MONOCYTES NFR BLD: 9 % (ref 4–15)
NEUTROPHILS # BLD AUTO: 3 K/UL (ref 1.8–7.7)
NEUTROPHILS NFR BLD: 65 % (ref 38–73)
NRBC BLD-RTO: 0 /100 WBC
PHOSPHATE SERPL-MCNC: 6.5 MG/DL (ref 2.7–4.5)
PLATELET # BLD AUTO: 142 K/UL (ref 150–450)
PMV BLD AUTO: 9.8 FL (ref 9.2–12.9)
POTASSIUM SERPL-SCNC: 5 MMOL/L (ref 3.5–5.1)
RBC # BLD AUTO: 4.09 M/UL (ref 4.6–6.2)
SODIUM SERPL-SCNC: 134 MMOL/L (ref 136–145)
WBC # BLD AUTO: 4.68 K/UL (ref 3.9–12.7)

## 2025-02-17 PROCEDURE — 25000003 PHARM REV CODE 250: Mod: HCNC | Performed by: ORTHOPAEDIC SURGERY

## 2025-02-17 PROCEDURE — C1713 ANCHOR/SCREW BN/BN,TIS/BN: HCPCS | Mod: HCNC | Performed by: ORTHOPAEDIC SURGERY

## 2025-02-17 PROCEDURE — 63600175 PHARM REV CODE 636 W HCPCS: Mod: HCNC | Performed by: STUDENT IN AN ORGANIZED HEALTH CARE EDUCATION/TRAINING PROGRAM

## 2025-02-17 PROCEDURE — 71000039 HC RECOVERY, EACH ADD'L HOUR: Mod: HCNC | Performed by: ORTHOPAEDIC SURGERY

## 2025-02-17 PROCEDURE — 8E0Y0CZ ROBOTIC ASSISTED PROCEDURE OF LOWER EXTREMITY, OPEN APPROACH: ICD-10-PCS | Performed by: ORTHOPAEDIC SURGERY

## 2025-02-17 PROCEDURE — 36415 COLL VENOUS BLD VENIPUNCTURE: CPT | Mod: HCNC | Performed by: STUDENT IN AN ORGANIZED HEALTH CARE EDUCATION/TRAINING PROGRAM

## 2025-02-17 PROCEDURE — 36000713 HC OR TIME LEV V EA ADD 15 MIN: Mod: HCNC | Performed by: ORTHOPAEDIC SURGERY

## 2025-02-17 PROCEDURE — 63600175 PHARM REV CODE 636 W HCPCS: Mod: HCNC | Performed by: ORTHOPAEDIC SURGERY

## 2025-02-17 PROCEDURE — 0SR902A REPLACEMENT OF RIGHT HIP JOINT WITH METAL ON POLYETHYLENE SYNTHETIC SUBSTITUTE, UNCEMENTED, OPEN APPROACH: ICD-10-PCS | Performed by: ORTHOPAEDIC SURGERY

## 2025-02-17 PROCEDURE — 80048 BASIC METABOLIC PNL TOTAL CA: CPT | Mod: HCNC | Performed by: STUDENT IN AN ORGANIZED HEALTH CARE EDUCATION/TRAINING PROGRAM

## 2025-02-17 PROCEDURE — 90935 HEMODIALYSIS ONE EVALUATION: CPT | Mod: HCNC

## 2025-02-17 PROCEDURE — 83735 ASSAY OF MAGNESIUM: CPT | Mod: HCNC | Performed by: STUDENT IN AN ORGANIZED HEALTH CARE EDUCATION/TRAINING PROGRAM

## 2025-02-17 PROCEDURE — 25000003 PHARM REV CODE 250: Mod: HCNC

## 2025-02-17 PROCEDURE — 63600175 PHARM REV CODE 636 W HCPCS: Mod: HCNC

## 2025-02-17 PROCEDURE — 27130 TOTAL HIP ARTHROPLASTY: CPT | Mod: HCNC,RT,, | Performed by: ORTHOPAEDIC SURGERY

## 2025-02-17 PROCEDURE — 71000033 HC RECOVERY, INTIAL HOUR: Mod: HCNC | Performed by: ORTHOPAEDIC SURGERY

## 2025-02-17 PROCEDURE — 27201423 OPTIME MED/SURG SUP & DEVICES STERILE SUPPLY: Mod: HCNC | Performed by: ORTHOPAEDIC SURGERY

## 2025-02-17 PROCEDURE — 0055T BONE SRGRY CMPTR CT/MRI IMAG: CPT | Mod: HCNC,,, | Performed by: ORTHOPAEDIC SURGERY

## 2025-02-17 PROCEDURE — 37000008 HC ANESTHESIA 1ST 15 MINUTES: Mod: HCNC | Performed by: ORTHOPAEDIC SURGERY

## 2025-02-17 PROCEDURE — 99232 SBSQ HOSP IP/OBS MODERATE 35: CPT | Mod: HCNC,,, | Performed by: STUDENT IN AN ORGANIZED HEALTH CARE EDUCATION/TRAINING PROGRAM

## 2025-02-17 PROCEDURE — 84100 ASSAY OF PHOSPHORUS: CPT | Mod: HCNC | Performed by: STUDENT IN AN ORGANIZED HEALTH CARE EDUCATION/TRAINING PROGRAM

## 2025-02-17 PROCEDURE — 5A1D70Z PERFORMANCE OF URINARY FILTRATION, INTERMITTENT, LESS THAN 6 HOURS PER DAY: ICD-10-PCS | Performed by: INTERNAL MEDICINE

## 2025-02-17 PROCEDURE — 36000712 HC OR TIME LEV V 1ST 15 MIN: Mod: HCNC | Performed by: ORTHOPAEDIC SURGERY

## 2025-02-17 PROCEDURE — 85025 COMPLETE CBC W/AUTO DIFF WBC: CPT | Mod: HCNC | Performed by: STUDENT IN AN ORGANIZED HEALTH CARE EDUCATION/TRAINING PROGRAM

## 2025-02-17 PROCEDURE — C1776 JOINT DEVICE (IMPLANTABLE): HCPCS | Mod: HCNC | Performed by: ORTHOPAEDIC SURGERY

## 2025-02-17 PROCEDURE — 21400001 HC TELEMETRY ROOM: Mod: HCNC

## 2025-02-17 PROCEDURE — 25000003 PHARM REV CODE 250: Mod: HCNC | Performed by: STUDENT IN AN ORGANIZED HEALTH CARE EDUCATION/TRAINING PROGRAM

## 2025-02-17 PROCEDURE — 85730 THROMBOPLASTIN TIME PARTIAL: CPT | Mod: HCNC | Performed by: STUDENT IN AN ORGANIZED HEALTH CARE EDUCATION/TRAINING PROGRAM

## 2025-02-17 PROCEDURE — 37000009 HC ANESTHESIA EA ADD 15 MINS: Mod: HCNC | Performed by: ORTHOPAEDIC SURGERY

## 2025-02-17 DEVICE — LINER- CEMENTLESS
Type: IMPLANTABLE DEVICE | Site: HIP | Status: FUNCTIONAL
Brand: MDM

## 2025-02-17 DEVICE — RESTORATION ADM/MDM X3 INSERT
Type: IMPLANTABLE DEVICE | Site: HIP | Status: FUNCTIONAL
Brand: RESTORATION ADM/MDM X3 INSERT

## 2025-02-17 DEVICE — TRIDENT II TRITANIUM CLUSTER 54E
Type: IMPLANTABLE DEVICE | Site: HIP | Status: FUNCTIONAL
Brand: TRIDENT II

## 2025-02-17 DEVICE — 6.5MM LOW PROFILE HEX SCREW 25MM
Type: IMPLANTABLE DEVICE | Site: HIP | Status: FUNCTIONAL
Brand: TRIDENT II

## 2025-02-17 DEVICE — CERAMIC V40 FEMORAL HEAD
Type: IMPLANTABLE DEVICE | Site: HIP | Status: FUNCTIONAL
Brand: BIOLOX

## 2025-02-17 DEVICE — HIP STEM - STANDARD OFFSET
Type: IMPLANTABLE DEVICE | Site: HIP | Status: FUNCTIONAL
Brand: INSIGNIA

## 2025-02-17 RX ORDER — ROCURONIUM BROMIDE 10 MG/ML
INJECTION, SOLUTION INTRAVENOUS
Status: DISCONTINUED | OUTPATIENT
Start: 2025-02-17 | End: 2025-02-17

## 2025-02-17 RX ORDER — ONDANSETRON HYDROCHLORIDE 2 MG/ML
INJECTION, SOLUTION INTRAVENOUS
Status: DISCONTINUED | OUTPATIENT
Start: 2025-02-17 | End: 2025-02-17

## 2025-02-17 RX ORDER — MUPIROCIN 20 MG/G
OINTMENT TOPICAL 2 TIMES DAILY
Status: DISPENSED | OUTPATIENT
Start: 2025-02-17 | End: 2025-02-22

## 2025-02-17 RX ORDER — CEFAZOLIN 2 G/1
2 INJECTION, POWDER, FOR SOLUTION INTRAMUSCULAR; INTRAVENOUS
Status: COMPLETED | OUTPATIENT
Start: 2025-02-17 | End: 2025-02-18

## 2025-02-17 RX ORDER — PHENYLEPHRINE HYDROCHLORIDE 10 MG/ML
INJECTION INTRAVENOUS
Status: DISCONTINUED | OUTPATIENT
Start: 2025-02-17 | End: 2025-02-17

## 2025-02-17 RX ORDER — DEXAMETHASONE SODIUM PHOSPHATE 4 MG/ML
INJECTION, SOLUTION INTRA-ARTICULAR; INTRALESIONAL; INTRAMUSCULAR; INTRAVENOUS; SOFT TISSUE
Status: DISCONTINUED | OUTPATIENT
Start: 2025-02-17 | End: 2025-02-17

## 2025-02-17 RX ORDER — PROPOFOL 10 MG/ML
VIAL (ML) INTRAVENOUS
Status: DISCONTINUED | OUTPATIENT
Start: 2025-02-17 | End: 2025-02-17

## 2025-02-17 RX ORDER — SODIUM CHLORIDE 0.9 % (FLUSH) 0.9 %
2 SYRINGE (ML) INJECTION
Status: DISCONTINUED | OUTPATIENT
Start: 2025-02-17 | End: 2025-02-27 | Stop reason: HOSPADM

## 2025-02-17 RX ORDER — FENTANYL CITRATE 50 UG/ML
INJECTION, SOLUTION INTRAMUSCULAR; INTRAVENOUS
Status: DISCONTINUED | OUTPATIENT
Start: 2025-02-17 | End: 2025-02-17

## 2025-02-17 RX ORDER — HYDROMORPHONE HYDROCHLORIDE 2 MG/ML
INJECTION, SOLUTION INTRAMUSCULAR; INTRAVENOUS; SUBCUTANEOUS
Status: DISCONTINUED | OUTPATIENT
Start: 2025-02-17 | End: 2025-02-17

## 2025-02-17 RX ORDER — SODIUM CHLORIDE 9 MG/ML
INJECTION, SOLUTION INTRAVENOUS CONTINUOUS
Status: DISCONTINUED | OUTPATIENT
Start: 2025-02-17 | End: 2025-02-19

## 2025-02-17 RX ORDER — LIDOCAINE HYDROCHLORIDE 20 MG/ML
INJECTION INTRAVENOUS
Status: DISCONTINUED | OUTPATIENT
Start: 2025-02-17 | End: 2025-02-17

## 2025-02-17 RX ORDER — ROPIVACAINE/EPI/CLONIDINE/KET 2.46-0.005
SYRINGE (ML) INJECTION
Status: DISCONTINUED | OUTPATIENT
Start: 2025-02-17 | End: 2025-02-17 | Stop reason: HOSPADM

## 2025-02-17 RX ORDER — CEFAZOLIN SODIUM 1 G/3ML
2 INJECTION, POWDER, FOR SOLUTION INTRAMUSCULAR; INTRAVENOUS ONCE
Status: COMPLETED | OUTPATIENT
Start: 2025-02-17 | End: 2025-02-17

## 2025-02-17 RX ADMIN — PROPOFOL 40 MG: 10 INJECTION, EMULSION INTRAVENOUS at 01:02

## 2025-02-17 RX ADMIN — Medication 1 CAPSULE: at 09:02

## 2025-02-17 RX ADMIN — HYDROMORPHONE HYDROCHLORIDE 0.2 MG: 2 INJECTION INTRAMUSCULAR; INTRAVENOUS; SUBCUTANEOUS at 03:02

## 2025-02-17 RX ADMIN — PHENYLEPHRINE HYDROCHLORIDE 100 MCG: 10 INJECTION INTRAVENOUS at 12:02

## 2025-02-17 RX ADMIN — PHENYLEPHRINE HYDROCHLORIDE 100 MCG: 10 INJECTION INTRAVENOUS at 02:02

## 2025-02-17 RX ADMIN — ONDANSETRON 4 MG: 2 INJECTION INTRAMUSCULAR; INTRAVENOUS at 11:02

## 2025-02-17 RX ADMIN — ROCURONIUM BROMIDE 30 MG: 10 INJECTION INTRAVENOUS at 12:02

## 2025-02-17 RX ADMIN — PHENYLEPHRINE HYDROCHLORIDE 100 MCG: 10 INJECTION INTRAVENOUS at 01:02

## 2025-02-17 RX ADMIN — MUPIROCIN: 20 OINTMENT TOPICAL at 10:02

## 2025-02-17 RX ADMIN — CEFAZOLIN 2 G: 2 INJECTION, POWDER, FOR SOLUTION INTRAMUSCULAR; INTRAVENOUS at 09:02

## 2025-02-17 RX ADMIN — LIDOCAINE HYDROCHLORIDE 60 MG: 20 INJECTION, SOLUTION INTRAVENOUS at 12:02

## 2025-02-17 RX ADMIN — FINASTERIDE 5 MG: 5 TABLET, FILM COATED ORAL at 09:02

## 2025-02-17 RX ADMIN — ATORVASTATIN CALCIUM 80 MG: 40 TABLET, FILM COATED ORAL at 09:02

## 2025-02-17 RX ADMIN — ONDANSETRON 4 MG: 2 INJECTION INTRAMUSCULAR; INTRAVENOUS at 04:02

## 2025-02-17 RX ADMIN — FENTANYL CITRATE 50 MCG: 50 INJECTION, SOLUTION INTRAMUSCULAR; INTRAVENOUS at 01:02

## 2025-02-17 RX ADMIN — SODIUM CHLORIDE, SODIUM LACTATE, POTASSIUM CHLORIDE, AND CALCIUM CHLORIDE: .6; .31; .03; .02 INJECTION, SOLUTION INTRAVENOUS at 12:02

## 2025-02-17 RX ADMIN — HEPARIN SODIUM 17 UNITS/KG/HR: 10000 INJECTION, SOLUTION INTRAVENOUS at 01:02

## 2025-02-17 RX ADMIN — PHENYLEPHRINE HYDROCHLORIDE 40 MCG/MIN: 10 INJECTION INTRAVENOUS at 01:02

## 2025-02-17 RX ADMIN — EZETIMIBE 10 MG: 10 TABLET ORAL at 09:02

## 2025-02-17 RX ADMIN — PANTOPRAZOLE SODIUM 40 MG: 40 TABLET, DELAYED RELEASE ORAL at 09:02

## 2025-02-17 RX ADMIN — HYDROMORPHONE HYDROCHLORIDE 0.4 MG: 2 INJECTION INTRAMUSCULAR; INTRAVENOUS; SUBCUTANEOUS at 02:02

## 2025-02-17 RX ADMIN — APIXABAN 5 MG: 5 TABLET, FILM COATED ORAL at 09:02

## 2025-02-17 RX ADMIN — FENTANYL CITRATE 100 MCG: 50 INJECTION, SOLUTION INTRAMUSCULAR; INTRAVENOUS at 12:02

## 2025-02-17 RX ADMIN — GLYCOPYRROLATE 0.2 MG: 0.2 INJECTION, SOLUTION INTRAMUSCULAR; INTRAVITREAL at 01:02

## 2025-02-17 RX ADMIN — ONDANSETRON 4 MG: 2 INJECTION, SOLUTION INTRAMUSCULAR; INTRAVENOUS at 02:02

## 2025-02-17 RX ADMIN — CEFAZOLIN SODIUM 2 G: 1 POWDER, FOR SOLUTION INTRAMUSCULAR; INTRAVENOUS at 12:02

## 2025-02-17 RX ADMIN — SUGAMMADEX 200 MG: 100 INJECTION, SOLUTION INTRAVENOUS at 03:02

## 2025-02-17 RX ADMIN — DEXAMETHASONE SODIUM PHOSPHATE 4 MG: 4 INJECTION, SOLUTION INTRAMUSCULAR; INTRAVENOUS at 12:02

## 2025-02-17 RX ADMIN — TRANEXAMIC ACID 1000 MG: 100 INJECTION, SOLUTION INTRAVENOUS at 12:02

## 2025-02-17 RX ADMIN — OXYCODONE 10 MG: 5 TABLET ORAL at 06:02

## 2025-02-17 RX ADMIN — PROPOFOL 120 MG: 10 INJECTION, EMULSION INTRAVENOUS at 12:02

## 2025-02-17 RX ADMIN — ROCURONIUM BROMIDE 10 MG: 10 INJECTION INTRAVENOUS at 02:02

## 2025-02-17 RX ADMIN — ROCURONIUM BROMIDE 50 MG: 10 INJECTION INTRAVENOUS at 12:02

## 2025-02-17 RX ADMIN — HYDROMORPHONE HYDROCHLORIDE 0.4 MG: 2 INJECTION INTRAMUSCULAR; INTRAVENOUS; SUBCUTANEOUS at 03:02

## 2025-02-17 RX ADMIN — TAMSULOSIN HYDROCHLORIDE 0.8 MG: 0.4 CAPSULE ORAL at 09:02

## 2025-02-17 RX ADMIN — ROCURONIUM BROMIDE 10 MG: 10 INJECTION INTRAVENOUS at 01:02

## 2025-02-17 RX ADMIN — TRANEXAMIC ACID 1000 MG: 100 INJECTION, SOLUTION INTRAVENOUS at 02:02

## 2025-02-17 RX ADMIN — OXYCODONE 10 MG: 5 TABLET ORAL at 12:02

## 2025-02-17 NOTE — SUBJECTIVE & OBJECTIVE
Interval History: no acute events overnight. To go to OR today and due for dialysis    Review of patient's allergies indicates:   Allergen Reactions    Ace inhibitors      Hyperkalemia 8/2018  Other reaction(s): Unknown    Penicillins Hives     Tolerated cefepime and cefdinir previously    Tizanidine      Felt hot     Current Facility-Administered Medications   Medication Frequency    0.9% NaCl infusion Continuous    acetaminophen tablet 650 mg Q4H PRN    apixaban tablet 5 mg BID    aspirin chewable tablet 81 mg Daily    atorvastatin tablet 80 mg Daily    ceFAZolin 2 g Q8H    dextrose 50% injection 12.5 g PRN    dextrose 50% injection 25 g PRN    ezetimibe tablet 10 mg Daily    ferrous gluconate tablet 324 mg Daily with breakfast    finasteride tablet 5 mg Daily    glucagon (human recombinant) injection 1 mg PRN    glucose chewable tablet 16 g PRN    glucose chewable tablet 24 g PRN    heparin 25,000 units in dextrose 5% (100 units/ml) IV bolus from bag LOW INTENSITY nomogram - [facility] PRN    heparin 25,000 units in dextrose 5% (100 units/ml) IV bolus from bag LOW INTENSITY nomogram - [facility] PRN    heparin 25,000 units in dextrose 5% 250 mL (100 units/mL) infusion LOW INTENSITY nomogram - [facility] Continuous    HYDROcodone-acetaminophen 5-325 mg per tablet 1 tablet Q6H PRN    mupirocin 2 % ointment BID    naloxone 0.4 mg/mL injection 0.02 mg PRN    ondansetron injection 4 mg Q8H PRN    oxyCODONE immediate release tablet 10 mg Q6H PRN    pantoprazole EC tablet 40 mg BID    sevelamer carbonate tablet 800 mg TID WM    sodium chloride 0.9% flush 10 mL Q12H PRN    tamsulosin 24 hr capsule 0.8 mg Daily    vitamin renal formula (B-complex-vitamin c-folic acid) 1 mg per capsule 1 capsule Daily       Objective:     Vital Signs (Most Recent):  Temp: 98 °F (36.7 °C) (02/17/25 1630)  Pulse: 98 (02/17/25 1630)  Resp: 12 (02/17/25 1630)  BP: (!) 125/58 (02/17/25 1630)  SpO2: 98 % (02/17/25 1630) Vital Signs (24h Range):  Temp:  [97.3 °F  (36.3 °C)-98.3 °F (36.8 °C)] 98 °F (36.7 °C)  Pulse:  [] 98  Resp:  [12-21] 12  SpO2:  [90 %-100 %] 98 %  BP: ()/(50-82) 125/58     Weight: 72.9 kg (160 lb 11.5 oz) (02/15/25 2015)  Body mass index is 24.44 kg/m².  Body surface area is 1.87 meters squared.    I/O last 3 completed shifts:  In: 481.8 [P.O.:240; I.V.:241.8]  Out: 0      Physical Exam  Vitals and nursing note reviewed.   Constitutional:       General: He is not in acute distress.  Cardiovascular:      Rate and Rhythm: Normal rate and regular rhythm.      Pulses: Normal pulses.   Pulmonary:      Effort: Pulmonary effort is normal.   Abdominal:      General: Bowel sounds are normal. There is no distension.   Musculoskeletal:         General: Tenderness and signs of injury present. No swelling.      Comments: Pain to palpation RLE and with any movement. Wounds dressed to RLE foot.   Neurological:      Mental Status: He is alert and oriented to person, place, and time. Mental status is at baseline.   Psychiatric:         Mood and Affect: Mood normal.         Thought Content: Thought content normal.          Significant Labs:  All labs within the past 24 hours have been reviewed.     Significant Imaging:  Labs: Reviewed

## 2025-02-17 NOTE — ASSESSMENT & PLAN NOTE
ESRD MWF    HD today after OR  -Keep MAP > 65  -Keep hemoglobin > 7  -Strict ins and outs  -Renally dose medications  -Avoid drastic hemodynamic changes if possible      #Anemia  Hemoglobin   Date Value Ref Range Status   02/17/2025 10.9 (L) 14.0 - 18.0 g/dL Final     Iron   Date Value Ref Range Status   01/10/2021 49 45 - 160 ug/dL Final     Transferrin   Date Value Ref Range Status   01/10/2021 82 (L) 200 - 375 mg/dL Final     TIBC   Date Value Ref Range Status   01/10/2021 121 (L) 250 - 450 ug/dL Final     Saturated Iron   Date Value Ref Range Status   01/10/2021 40 20 - 50 % Final     Ferritin   Date Value Ref Range Status   01/10/2021 3,442 (H) 20.0 - 300.0 ng/mL Final   Repeat iron and ferritin. HB at goal continue to monitor      #Secondary hyperparathyroidism  Calcium   Date Value Ref Range Status   02/17/2025 8.5 (L) 8.7 - 10.5 mg/dL Final     Phosphorus   Date Value Ref Range Status   02/17/2025 6.5 (H) 2.7 - 4.5 mg/dL Final     PTH, Intact   Date Value Ref Range Status   02/10/2025 589 (H) 16 - 80 pg/mL Final   Phos binders  Continue to monitor

## 2025-02-17 NOTE — NURSING
[facility] [place]  Nurses Note -- 4 Eyes      2/16/2025       Skin assessed on: Q Shift      [] No Pressure Injuries Present    []Prevention Measures Documented    [x] Yes LDA  for Pressure Injury Previously documented     [] Yes New Pressure Injury Discovered   [] LDA for New Pressure Injury Added      Attending RN:  Stephanie Barthelemy, RN     Second RN:  Leslye Regalado LPN

## 2025-02-17 NOTE — ANESTHESIA PREPROCEDURE EVALUATION
02/17/2025  Miguel Moore is a 69 y.o., male.      Pre-op Assessment    I have reviewed the Patient Summary Reports.     I have reviewed the Nursing Notes. I have reviewed the NPO Status.   I have reviewed the Medications.     Review of Systems  Anesthesia Hx:  No problems with previous Anesthesia             Denies Family Hx of Anesthesia complications.    Denies Personal Hx of Anesthesia complications.                    Hematology/Oncology:                   Hematology Comments: Eliquis at home    Heparin drip    DVT                    Cardiovascular:     Hypertension    Dysrhythmias atrial fibrillation      hyperlipidemia                               Pulmonary:  Pulmonary Normal                       Renal/:  Chronic Renal Disease, ESRD  BPH MWF             Hepatic/GI:  Hepatic/GI Normal                    Musculoskeletal:     Right femoral neck fracture            Neurological:   CVA, residual symptoms    Seizures                                Endocrine:  Diabetes               Physical Exam  General: Well nourished, Cooperative, Alert and Oriented    Airway:  Mallampati: II   Mouth Opening: Normal  TM Distance: Normal  Tongue: Normal  Neck ROM: Normal ROM    Dental:  Partial Dentures      Wt Readings from Last 3 Encounters:   02/15/25 72.9 kg (160 lb 11.5 oz)   01/14/25 74.4 kg (164 lb 0.4 oz)   11/27/24 74.4 kg (164 lb)     Temp Readings from Last 3 Encounters:   02/17/25 36.8 °C (98.2 °F) (Oral)   12/05/24 36.5 °C (97.7 °F) (Axillary)   12/03/24 36.4 °C (97.6 °F)     BP Readings from Last 3 Encounters:   02/17/25 123/78   12/05/24 (!) 105/56   12/03/24 (!) 109/59     Pulse Readings from Last 3 Encounters:   02/17/25 71   12/05/24 78   12/03/24 70     Lab Results   Component Value Date    WBC 4.68 02/17/2025    HGB 10.9 (L) 02/17/2025    HCT 32.9 (L) 02/17/2025    MCV 80 (L) 02/17/2025      (L) 02/17/2025       CMP  Sodium   Date Value Ref Range Status   02/17/2025 134 (L) 136 - 145 mmol/L Final     Potassium   Date Value Ref Range Status   02/17/2025 5.0 3.5 - 5.1 mmol/L Final     Chloride   Date Value Ref Range Status   02/17/2025 94 (L) 95 - 110 mmol/L Final     CO2   Date Value Ref Range Status   02/17/2025 22 (L) 23 - 29 mmol/L Final     Glucose   Date Value Ref Range Status   02/17/2025 136 (H) 70 - 110 mg/dL Final     BUN   Date Value Ref Range Status   02/17/2025 60 (H) 8 - 23 mg/dL Final     Creatinine   Date Value Ref Range Status   02/17/2025 12.2 (H) 0.5 - 1.4 mg/dL Final     Calcium   Date Value Ref Range Status   02/17/2025 8.5 (L) 8.7 - 10.5 mg/dL Final     Total Protein   Date Value Ref Range Status   02/15/2025 7.2 6.0 - 8.4 g/dL Final     Albumin   Date Value Ref Range Status   02/15/2025 3.4 (L) 3.5 - 5.2 g/dL Final     Total Bilirubin   Date Value Ref Range Status   02/15/2025 0.6 0.1 - 1.0 mg/dL Final     Comment:     For infants and newborns, interpretation of results should be based  on gestational age, weight and in agreement with clinical  observations.    Premature Infant recommended reference ranges:  Up to 24 hours.............<8.0 mg/dL  Up to 48 hours............<12.0 mg/dL  3-5 days..................<15.0 mg/dL  6-29 days.................<15.0 mg/dL       Alkaline Phosphatase   Date Value Ref Range Status   02/15/2025 76 40 - 150 U/L Final     AST   Date Value Ref Range Status   02/15/2025 12 10 - 40 U/L Final     ALT   Date Value Ref Range Status   02/15/2025 <5 (L) 10 - 44 U/L Final     Anion Gap   Date Value Ref Range Status   02/17/2025 18 (H) 8 - 16 mmol/L Final     eGFR   Date Value Ref Range Status   02/17/2025 4 (A) >60 mL/min/1.73 m^2 Final         Anesthesia Plan  Type of Anesthesia, risks & benefits discussed:    Anesthesia Type: Gen ETT  Intra-op Monitoring Plan: Standard ASA Monitors  Induction:  IV  Airway Plan: Video  Informed Consent: Informed consent  signed with the Patient and all parties understand the risks and agree with anesthesia plan.  All questions answered. Patient consented to blood products? Yes  ASA Score: 3    Ready For Surgery From Anesthesia Perspective.     .

## 2025-02-17 NOTE — NURSING
Patient complained of 7/10 pain. Gave prn oxycodone. When reassessed, patient stated pain was 4/10. 12:39PM aPTT was 39.2, which is therapeutic, so no change to heparin infusion. Heparin still infusing at 17 units/kg/hr. Next aPTT scheduled for today at 1925. Patient complained of 7/10 pain. Gave prn oxycodone. When reassessed, patient stated pain was 3/10. Dialysis scheduled for tomorrow. Hip surgery scheduled for tomorrow. NPO at midnight. Patient AAOx4. Vital signs stable. Patient not showing signs of distress. Bed in lowest position, wheels locked, call bell in reach, side rails up x2.

## 2025-02-17 NOTE — NURSING
Received report from DARRION Moss in PACU. Pt had a (R) Total Hip Arthroplasty done. Pt has two sites dressed with Mepilex and Aquacel to dermabond incision. Mild pain stated. /5/, HR NSR 96, O2 97% on 2L NC, T 98.0. Awaiting pt's arrival to the floor.

## 2025-02-17 NOTE — TRANSFER OF CARE
"Anesthesia Transfer of Care Note    Patient: Miguel Moore    Procedure(s) Performed: Procedure(s) (LRB):  ARTHROPLASTY, HIP, TOTAL, USING COMPUTER-ASSISTED NAVIGATION (Right)    Patient location: PACU    Anesthesia Type: general    Transport from OR: Transported from OR on 6-10 L/min O2 by face mask with adequate spontaneous ventilation    Post pain: adequate analgesia    Post assessment: no apparent anesthetic complications and tolerated procedure well    Post vital signs: stable    Level of consciousness: sedated and responds to stimulation    Nausea/Vomiting: no nausea/vomiting    Complications: none    Transfer of care protocol was followed      Last vitals: Visit Vitals  BP (!) 102/52 (BP Location: Right arm, Patient Position: Lying)   Pulse 97   Temp 36.3 °C (97.3 °F) (Temporal)   Resp (!) 6   Ht 5' 8" (1.727 m)   Wt 72.9 kg (160 lb 11.5 oz)   SpO2 100%   BMI 24.44 kg/m²     "

## 2025-02-17 NOTE — PLAN OF CARE
Problem: Adult Inpatient Plan of Care  Goal: Absence of Hospital-Acquired Illness or Injury  Outcome: Progressing  Intervention: Prevent Infection  Flowsheets (Taken 2/17/2025 0525)  Infection Prevention:   cohorting utilized   environmental surveillance performed   hand hygiene promoted   rest/sleep promoted   single patient room provided  Goal: Optimal Comfort and Wellbeing  Outcome: Progressing  Intervention: Monitor Pain and Promote Comfort  Flowsheets (Taken 2/17/2025 0525)  Pain Management Interventions: medication offered but refused  Intervention: Provide Person-Centered Care  Flowsheets (Taken 2/17/2025 0525)  Trust Relationship/Rapport:   care explained   empathic listening provided   questions encouraged   reassurance provided   thoughts/feelings acknowledged     Problem: Fall Injury Risk  Goal: Absence of Fall and Fall-Related Injury  Outcome: Progressing  Intervention: Promote Injury-Free Environment  Flowsheets (Taken 2/17/2025 0525)  Safety Promotion/Fall Prevention:   assistive device/personal item within reach   bed alarm set   instructed to call staff for mobility   lighting adjusted   medications reviewed   nonskid shoes/socks when out of bed   room near unit station   side rails raised x 2     Problem: Skin Injury Risk Increased  Goal: Skin Health and Integrity  Outcome: Progressing  Intervention: Optimize Skin Protection  Flowsheets (Taken 2/17/2025 0525)  Pressure Reduction Techniques:   frequent weight shift encouraged   pressure points protected   positioned off wounds   weight shift assistance provided

## 2025-02-17 NOTE — NURSING
Report received from night nurse DARRION Hills.  Visualized patient and assessed patient's overall condition and appearance. No acute distress noted. Plan of care ongoing.    [facility] [place]  Nurses Note -- 4 Eyes      2/17/2025       Skin assessed on: Q Shift      [] No Pressure Injuries Present    []Prevention Measures Documented    [x] Yes LDA  for Pressure Injury Previously documented     [] Yes New Pressure Injury Discovered   [] LDA for New Pressure Injury Added      Attending RN:  Kaela Connors LPN     Second RN:  Stephanie Barthelemy,RN

## 2025-02-17 NOTE — ANESTHESIA PROCEDURE NOTES
Intubation    Date/Time: 2/17/2025 12:15 PM    Performed by: Dipak Alonzo CRNA  Authorized by: Jordan Grimes MD    Intubation:     Induction:  Intravenous    Intubated:  Postinduction    Mask Ventilation:  Easy with oral airway    Attempts:  1    Attempted By:  CRNA    Method of Intubation:  Video laryngoscopy    Blade:  Garland 3    Laryngeal View Grade: Grade I - full view of cords      Difficult Airway Encountered?: No      Complications:  None    Airway Device Size:  7.5    Style/Cuff Inflation:  Cuffed (inflated to minimal occlusive pressure)    Inflation Amount (mL):  6    Tube secured:  22    Secured at:  The lips    Placement Verified By:  Capnometry    Complicating Factors:  None    Findings Post-Intubation:  BS equal bilateral and atraumatic/condition of teeth unchanged

## 2025-02-17 NOTE — OP NOTE
OPERATIVE NOTE     PATIENT NAME: Miguel Moore   MRN: [id number]      Surgery Date: 2/17/25  Surgeon(s) and Assistant(s): Chris Quesada MD. Dannielle VARGAS     Procedure(s): right Primary Total Hip Arthroplasty     BMI:  Body mass index is 24.44 kg/m².      Anesthesia:  General        Attestation:   I was present for all of the critical portions of the operation and performed all critical portions.  The medical assistant performed the superficial closure under supervision, and assisted during the operation. I was immediately available for the duration of the entire case.      Preoperative Diagnosis:  right  Hip Femoral neck fracture    Postoperative Diagnosis: Same     Findings:  See Full Operative Report    Complications: None     EBL: 250cc     Implants:   Implant Name Type Inv. Item Serial No.  Lot No. LRB No. Used Action   PIN BONE 4 X 170MM STERILE - [id number]  PIN BONE 4 X 170MM STERILE  [employer] [id number] Right 2 Implanted and Explanted   SHELL TRIDENT II ACET E 54MM - [id number]  SHELL TRIDENT II ACET E 54MM  [employer] NAZIA. [id number] Right 1 Implanted   SCREW TRIDENT II LP HEX 6.5X25 - [id number]  SCREW TRIDENT II LP HEX 6.5X25  ESTEFANIA SALES NAZIA. [id number] Right 1 Implanted   LINER ACET SZ E 42MM COCR - [id number]  LINER ACET SZ E 42MM COCR  [employer]. [id number] Right 1 Implanted   INSERT AMD/MDM X3 48MM - [id number]  INSERT AMD/MDM X3 48MM  [employer]ZIA. [id number] Right 1 Implanted   STEM INSIGNIA HIP STD OFST 6 - [id number]  STEM INSIGNIA HIP STD OFST 6  [employer] NAZIA. [id number] Right 1 Implanted   HEAD V40 BIOLOX TAPR 28MM -4 - [id number]  HEAD V40 BIOLOX TAPR 28MM -4  [employer]. [id number] Right 1 Implanted       Drains: None     Problem List:   Problem List Items Addressed This Visit          Orthopedic    * (Principal)  Closed fracture of right hip - Primary    Relevant Orders    Inpatient consult to Orthopedic Surgery (Completed)     Other Visit Diagnoses         Pre-op exam        Relevant Orders    EKG 12-lead (Completed)      Closed displaced fracture of right femoral neck        Relevant Orders    CT Hip w/o Contrast Right w/Eitan Protocol (Completed)    Case Request Operating Room: ARTHROPLASTY, HIP, TOTAL, USING COMPUTER-ASSISTED NAVIGATION (Completed)    CT Hip w/o Contrast Right w/Eitan Protocol (Completed)      Chest pain        Relevant Orders    EKG 12-lead      Closed fracture of neck of right femur, initial encounter                   OPERATIVE INDICATIONS: The patient has a history of progressive right hip pain after a fall.  X-rays reveal right femoral neck fracture. He was a community Ambulator at baseline with use of cane. But he findings that may not be consistent with that. He has diabetic foot wounds, has small sacral ulcerations. He has right upper extremity contractures and baseline lower extremity weakness. But with all that I recommended a total hip arthroplasty as opposed to sahara secondary to his age and function with use of a cane. The risks, benefits and potential complications of the arthroplasty surgery were discussed with the patient in detail. Specific details of the procedure, hospitalization, recovery, rehabilitation, and long-term precautions were also provided. Pre-operative teaching was provided. Implant/prosthesis selection was outlined, and the many options available were explained; the final choice will be made at the time of the procedure to match the anatomy and condition of the bone, ligaments, tendons, and muscles. Understanding of all topics was conveyed to me by the patient, and consent was given to proceed with a total hip arthroplasty.      The patient was seen by Internal Medicine/Anesthesia for pre-operative optimization. Sania-operative blood management and the potential for blood  transfusion were discussed with risks and options clearly outlined. We discussed the risks of the procedure in detail, which included but is not limited to infection, bleeding, scarring, injury to blood vessels, nerves, muscular structures. We discussed specifically instability/dislocation, fracture, and leg-length complications.      OPERATIVE PROCEDURE: The patient was identified and brought into the Operating Room by the anesthesia and nursing team. General anesthesia was successfully performed.  Intravenous antibiotic of Cefazolin prophylaxis dosing was confirmed. The patient was then positioned in the lateral decubitus on the hip pegboard. An axillary roll was placed, and all other pressure points were checked and padded. The hip was then examined and restrictions noted. A relative leg length assessment was carried out and markers were placed for intra-operative assessment. The leg was then prepped and draped in the usual sterile fashion.     A surgical time-out was performed immediately preceding the incision with all personnel in the operating room; the patient identity was again confirmed, the surgical site and extremity were identified and confirmed, X-rays were reviewed, and availability of the appropriate surgical equipment was established.      The right hip was then exposed through a limited skin incision centered over the greater trochanter. Dissection was carried down through skin and subcutaneous tissue to the gluteal fascia. The gluteus taisha was split posteriorly over the trochanter in the direction of its fibers preserving the ITB.  Bleeders were controlled with electrocautery. A direct superior approach to the hip was accomplished, reflecting the piriformis.  A T-capsulotomy was performed for later repair.  The remainder of the capsule was not disrupted. The labrum was split and the femoral head mobilized. An in situ neck cut was made and the femoral head was removed. Assessment of the femoral  head revealed severe eburnation of articular cartilage with complete loss of weight bearing chondral surface and formation of cysts in the head and neck. Marginal osteophytes were present surrounding the femoral neck.     NAEL pins were placed superiorly. Array placed. Registration completed. Single reaming with planned cup was completed. The reamers created an excellent hemispherical bed of bleeding cancellous bone.  The cup was inserted with an excellent press fit. The press-fit was firm, stable, and apically seated. One  screws were used for additional support of the fixation. The MDM bearing/liner was then impacted into place and checked for stability. I chose this construct as he had significant pre-existing contractures about the hip which coupled with the fracture itself made him a high risk patient for dislocation. NAEL pins were removed.      Attention was then turned to the femur. The leg was positioned so access did not result in soft-tissue injury. The medullary cavity of the femur was entered and opened with hand reamers. broaches were then employed in an incremental fashion up to the final size. The final broach was then fully seated, had good rotational and axial stability, and was seated at the appropriate height in relation to the greater trochanter and the template. Trial reduction was done. Excellent stability and range of motion was achieved without impingement at any position. Leg lengths were re-created within millimeters based on the markers and relative measurement. The trials were then dislocated and removed. The wound was copiously irrigated, and the permanent femoral stem was then impacted down in approximately 20 degrees of anteversion. The press-fit was firm, and stable to axial and rotational force in all planes. The permanent femoral head was then impacted on the clean trunion. The socket and wound were irrigated, suctioned, and inspected for debris. The final reduction was performed,  and again the hip was stable in all planes without impingement when stressed to the extremes.      The capsule was repaired. The wound was irrigated. Instrument and sponge count was completed and confirmed correct. The gluteal fascia was closed with interrupted Stratafix suture. Deep subcutaneous tissue was closed with a running #1 Vicryl, and more superficial with interrupted 2-0 Vicryl. A 3-0 Monocryl stitch was used for the skin. Dermabond adhesive was applied. A sterile silver-impregnated dressing was placed. PAS stockings were placed. The patient was then returned to the supine position on the operating room table. After stability was confirmed they were transferred to a hospital bed and taken to Recovery/PACU.       POST-OPERATIVE PLAN:  Patient will be transferred to the floor once in stable condition and after radiographs confirm no immediate complications. The patient will be treated with multimodal pain management protocols. They will be placed on anticoagulation. The patient will be weight bearing as tolerated with posterior hip precautions for 6 weeks. They will work with physical therapy, have a graduated diet, and once medially stable and safe can be discharged. Patient will follow up with me 3 weeks post operatively. Dressing should be removed by patient 7 days post operatively.

## 2025-02-17 NOTE — PROGRESS NOTES
[facility] Surgery  Nephrology  Progress Note    Patient Name: Miguel Moore  MRN: [id number]  Admission Date: 2/15/2025  Hospital Length of Stay: 2 days  Attending Provider: Bonifacio Reyes MD   Primary Care Physician: Raciel Raymond MD  Principal Problem:Closed fracture of right hip    Subjective:     HPI: This is a 69 y.o. M with a PMHx of MI, seizures, T2DM, HTN, DVT , ESRD on HD presented to ED 2/15/24 via EMS with traumatic right hip pain s/p fall. Right hip xray + femoral neck fracture. Pt admitted for surgical management. Nephrology consulted for hemodialysis. Plan for right Primary Total Hip Arthroplasty 2/16/25    Prior records obtained and reviewed.  last HD session 2/14/25  Attending Nephrologist: CLEMENTE SHAHID MD  Dialysis Location: [facility]  Schedule: M-W-F   Duration 3.5hrs  EDW 70.2kg  LUE AVF    Interval History: no acute events overnight. To go to OR today and due for dialysis    Review of patient's allergies indicates:   Allergen Reactions    Ace inhibitors      Hyperkalemia 8/2018  Other reaction(s): Unknown    Penicillins Hives     Tolerated cefepime and cefdinir previously    Tizanidine      Felt hot     Current Facility-Administered Medications   Medication Frequency    0.9% NaCl infusion Continuous    acetaminophen tablet 650 mg Q4H PRN    apixaban tablet 5 mg BID    aspirin chewable tablet 81 mg Daily    atorvastatin tablet 80 mg Daily    ceFAZolin 2 g Q8H    dextrose 50% injection 12.5 g PRN    dextrose 50% injection 25 g PRN    ezetimibe tablet 10 mg Daily    ferrous gluconate tablet 324 mg Daily with breakfast    finasteride tablet 5 mg Daily    glucagon (human recombinant) injection 1 mg PRN    glucose chewable tablet 16 g PRN    glucose chewable tablet 24 g PRN    heparin 25,000 units in dextrose 5% (100 units/ml) IV bolus from bag LOW INTENSITY nomogram - [facility] PRN    heparin 25,000 units in dextrose 5% (100 units/ml) IV bolus from bag LOW INTENSITY nomogram - [facility]  PRN    heparin 25,000 units in dextrose 5% 250 mL (100 units/mL) infusion LOW INTENSITY nomogram - [facility] Continuous    HYDROcodone-acetaminophen 5-325 mg per tablet 1 tablet Q6H PRN    mupirocin 2 % ointment BID    naloxone 0.4 mg/mL injection 0.02 mg PRN    ondansetron injection 4 mg Q8H PRN    oxyCODONE immediate release tablet 10 mg Q6H PRN    pantoprazole EC tablet 40 mg BID    sevelamer carbonate tablet 800 mg TID WM    sodium chloride 0.9% flush 10 mL Q12H PRN    tamsulosin 24 hr capsule 0.8 mg Daily    vitamin renal formula (B-complex-vitamin c-folic acid) 1 mg per capsule 1 capsule Daily       Objective:     Vital Signs (Most Recent):  Temp: 98 °F (36.7 °C) (02/17/25 1630)  Pulse: 98 (02/17/25 1630)  Resp: 12 (02/17/25 1630)  BP: (!) 125/58 (02/17/25 1630)  SpO2: 98 % (02/17/25 1630) Vital Signs (24h Range):  Temp:  [97.3 °F (36.3 °C)-98.3 °F (36.8 °C)] 98 °F (36.7 °C)  Pulse:  [] 98  Resp:  [12-21] 12  SpO2:  [90 %-100 %] 98 %  BP: ()/(50-82) 125/58     Weight: 72.9 kg (160 lb 11.5 oz) (02/15/25 2015)  Body mass index is 24.44 kg/m².  Body surface area is 1.87 meters squared.    I/O last 3 completed shifts:  In: 481.8 [P.O.:240; I.V.:241.8]  Out: 0      Physical Exam  Vitals and nursing note reviewed.   Constitutional:       General: He is not in acute distress.  Cardiovascular:      Rate and Rhythm: Normal rate and regular rhythm.      Pulses: Normal pulses.   Pulmonary:      Effort: Pulmonary effort is normal.   Abdominal:      General: Bowel sounds are normal. There is no distension.   Musculoskeletal:         General: Tenderness and signs of injury present. No swelling.      Comments: Pain to palpation RLE and with any movement. Wounds dressed to RLE foot.   Neurological:      Mental Status: He is alert and oriented to person, place, and time. Mental status is at baseline.   Psychiatric:         Mood and Affect: Mood normal.         Thought Content: Thought content normal.          Significant  Labs:  All labs within the past 24 hours have been reviewed.     Significant Imaging:  Labs: Reviewed  Assessment/Plan:     Renal/  ESRD (end stage renal disease)  ESRD MWF    HD today after OR  -Keep MAP > 65  -Keep hemoglobin > 7  -Strict ins and outs  -Renally dose medications  -Avoid drastic hemodynamic changes if possible      #Anemia  Hemoglobin   Date Value Ref Range Status   02/17/2025 10.9 (L) 14.0 - 18.0 g/dL Final     Iron   Date Value Ref Range Status   01/10/2021 49 45 - 160 ug/dL Final     Transferrin   Date Value Ref Range Status   01/10/2021 82 (L) 200 - 375 mg/dL Final     TIBC   Date Value Ref Range Status   01/10/2021 121 (L) 250 - 450 ug/dL Final     Saturated Iron   Date Value Ref Range Status   01/10/2021 40 20 - 50 % Final     Ferritin   Date Value Ref Range Status   01/10/2021 3,442 (H) 20.0 - 300.0 ng/mL Final   Repeat iron and ferritin. HB at goal continue to monitor      #Secondary hyperparathyroidism  Calcium   Date Value Ref Range Status   02/17/2025 8.5 (L) 8.7 - 10.5 mg/dL Final     Phosphorus   Date Value Ref Range Status   02/17/2025 6.5 (H) 2.7 - 4.5 mg/dL Final     PTH, Intact   Date Value Ref Range Status   02/10/2025 589 (H) 16 - 80 pg/mL Final   Phos binders  Continue to monitor          Thank you for your consult. I will follow-up with patient. Please contact us if you have any additional questions.    Augustine Sandhu MD  Nephrology  [facility] [place] - Surgery

## 2025-02-17 NOTE — ASSESSMENT & PLAN NOTE
Patient on Eliquis at home.  Transitioned to heparin prior to surgery.  - resume eliquis later this evening

## 2025-02-17 NOTE — PROGRESS NOTES
[facility] Medicine  Progress Note    Patient Name: Miguel Moore  MRN: [id number]  Patient Class: IP- Inpatient   Admission Date: 2/15/2025  Length of Stay: 2 days  Attending Physician: Bonifacio Reyes MD  Primary Care Provider: Raciel Raymond MD        Subjective     Principal Problem:Closed fracture of right hip        HPI:  Is a 69-year-old male with history of CVA, hypertension, ESRD on HD, DVT on Eliquis who presents to the emergency department for evaluation after a fall.  Patient reports he gets around with a walker and he slipped and fell to his right hip.  Denies loss of consciousness, hitting of his head or other recent issues.  In the ED, Xray of right hip positive for acute right femoral neck fracture. CT confirmed. Orthopedic Surgery consulted. Plan for OR on 2/17/25. He will be admitted to hospital medicine for further management.     Overview/Hospital Course:  Mr. Moore is a 69-year-old male who was admitted with a right femoral neck fracture.  Orthopedic surgery consulted.  Plan for hip replacement on 02/17/2025.  Nephrology consulted for dialysis.    Interval History: seen post surgery in PACU, doing well. No acute issues. Discussed with Orthopedic surgery    Review of Systems  Objective:     Vital Signs (Most Recent):  Temp: 97.3 °F (36.3 °C) (02/17/25 1520)  Pulse: 94 (02/17/25 1545)  Resp: 16 (02/17/25 1545)  BP: (!) 101/52 (02/17/25 1545)  SpO2: 100 % (02/17/25 1545) Vital Signs (24h Range):  Temp:  [97.3 °F (36.3 °C)-98.3 °F (36.8 °C)] 97.3 °F (36.3 °C)  Pulse:  [71-99] 94  Resp:  [16-21] 16  SpO2:  [95 %-100 %] 100 %  BP: ()/(50-82) 101/52     Weight: 72.9 kg (160 lb 11.5 oz)  Body mass index is 24.44 kg/m².    Intake/Output Summary (Last 24 hours) at 2/17/2025 1627  Last data filed at 2/17/2025 1515  Gross per 24 hour   Intake 1120 ml   Output 200 ml   Net 920 ml         Physical Exam  Vitals and nursing note reviewed.   Constitutional:       General: He is not  in acute distress.  Cardiovascular:      Rate and Rhythm: Normal rate and regular rhythm.      Pulses: Normal pulses.   Pulmonary:      Effort: Pulmonary effort is normal.   Abdominal:      General: Bowel sounds are normal. There is no distension.   Musculoskeletal:         General: No swelling.      Comments: Pain to palpation RLE and with any movement. Wounds dressed to RLE foot.   Neurological:      Mental Status: He is alert and oriented to person, place, and time. Mental status is at baseline.   Psychiatric:         Mood and Affect: Mood normal.         Thought Content: Thought content normal.             Significant Labs: All pertinent labs within the past 24 hours have been reviewed.    Significant Imaging: I have reviewed all pertinent imaging results/findings within the past 24 hours.    Assessment and Plan     * Closed fracture of right hip  Patient presents with a fall, subsequent right femoral neck fracture.  Orthopedic surgery consulted, plan for hip replacement on 02/17/2025.  Continue with supportive care and pain control.    - s/p hip replacement on 2/17/25  - PT/OT  - pain control    Wounds, multiple  Wound care at home for bilateral lower extremities.  Mainly right lower extremity, wound care comes Tuesdays and Thursdays.  We will consult Wound Care for further management.      Paroxysmal atrial fibrillation  Patient has paroxysmal (<7 days) atrial fibrillation. Patient is currently in sinus rhythm. [id number] Score: 3. The patients heart rate in the last 24 hours is as follows:  Pulse  Min: 85  Max: 96     Antiarrhythmics       Anticoagulants  heparin 25,000 units in dextrose 5% (100 units/ml) IV bolus from bag LOW INTENSITY nomogram - [facility], Once, Intravenous  heparin 25,000 units in dextrose 5% 250 mL (100 units/mL) infusion LOW INTENSITY nomogram - [facility], Continuous, Intravenous  heparin 25,000 units in dextrose 5% (100 units/ml) IV bolus from bag LOW INTENSITY nomogram - [facility], As needed (PRN),  "Intravenous  heparin 25,000 units in dextrose 5% (100 units/ml) IV bolus from bag LOW INTENSITY nomogram - [facility], As needed (PRN), Intravenous    Plan  - Replete lytes with a goal of K>4, Mg >2  - Patient is anticoagulated, see medications listed above.  - Patient's afib is currently controlled          Chronic deep vein thrombosis (DVT) of popliteal vein of right lower extremity 9/21/20 outside [place]; unprovoked; anticoagulation  Patient on Eliquis at home.  Transitioned to heparin prior to surgery.  - resume eliquis later this evening      DM type 2 without retinopathy  Patient's FSGs are controlled on current medication regimen.  Last A1c reviewed-   Lab Results   Component Value Date    HGBA1C 5.8 12/09/2024     Most recent fingerstick glucose reviewed- No results for input(s): "POCTGLUCOSE" in the last 24 hours.  Current correctional scale   none    ESRD (end stage renal disease)  Creatine stable for now. BMP reviewed- noted Estimated Creatinine Clearance: 7.7 mL/min (A) (based on SCr of 8.6 mg/dL (H)). according to latest data. Based on current GFR, CKD stage is end stage.  Monitor UOP and serial BMP and adjust therapy as needed. Renally dose meds. Avoid nephrotoxic medications and procedures.    Dialysis Monday Wednesday Friday.  Has left arm fistula.  Nephrology consulted.    Hemiplegia and hemiparesis following cerebral infarction affecting right dominant side  Noted      BPH (benign prostatic hyperplasia) 2/18/21 prostate bx normal  Resume home medications        VTE Risk Mitigation (From admission, onward)           Ordered     apixaban tablet 5 mg  2 times daily         02/17/25 1610     heparin 25,000 units in dextrose 5% (100 units/ml) IV bolus from bag LOW INTENSITY nomogram - [facility]  As needed (PRN)        Question:  Heparin Infusion Adjustment (DO NOT MODIFY ANSWER)  Answer:  [email or web address]\epic\Images\Pharmacy\HeparinInfusions\heparin LOW INTENSITY nomogram for [facility] [id number]    02/15/25 1525     heparin " 25,000 units in dextrose 5% (100 units/ml) IV bolus from bag LOW INTENSITY nomogram - [facility]  As needed (PRN)        Question:  Heparin Infusion Adjustment (DO NOT MODIFY ANSWER)  Answer:  [email or web address]\epic\Images\Pharmacy\HeparinInfusions\heparin LOW INTENSITY nomogram for [facility] [id number].pdf    02/15/25 1525     heparin 25,000 units in dextrose 5% 250 mL (100 units/mL) infusion LOW INTENSITY nomogram - [facility]  Continuous        Question:  Begin at (units/kg/hr)  Answer:  12    02/15/25 1525     IP VTE HIGH RISK PATIENT  Once         02/15/25 1525     Place sequential compression device  Until discontinued         02/15/25 1525                    Discharge Planning   GARY:      Code Status: Full Code   Medical Readiness for Discharge Date:   Discharge Plan A: Home                Please place Justification for DME        Bonifacio Reyes MD  Department of Hospital Medicine   [facility]

## 2025-02-17 NOTE — ASSESSMENT & PLAN NOTE
Patient presents with a fall, subsequent right femoral neck fracture.  Orthopedic surgery consulted, plan for hip replacement on 02/17/2025.  Continue with supportive care and pain control.    - s/p hip replacement on 2/17/25  - PT/OT  - pain control

## 2025-02-17 NOTE — SUBJECTIVE & OBJECTIVE
Interval History: seen post surgery in PACU, doing well. No acute issues. Discussed with Orthopedic surgery    Review of Systems  Objective:     Vital Signs (Most Recent):  Temp: 97.3 °F (36.3 °C) (02/17/25 1520)  Pulse: 94 (02/17/25 1545)  Resp: 16 (02/17/25 1545)  BP: (!) 101/52 (02/17/25 1545)  SpO2: 100 % (02/17/25 1545) Vital Signs (24h Range):  Temp:  [97.3 °F (36.3 °C)-98.3 °F (36.8 °C)] 97.3 °F (36.3 °C)  Pulse:  [71-99] 94  Resp:  [16-21] 16  SpO2:  [95 %-100 %] 100 %  BP: ()/(50-82) 101/52     Weight: 72.9 kg (160 lb 11.5 oz)  Body mass index is 24.44 kg/m².    Intake/Output Summary (Last 24 hours) at 2/17/2025 1627  Last data filed at 2/17/2025 1515  Gross per 24 hour   Intake 1120 ml   Output 200 ml   Net 920 ml         Physical Exam  Vitals and nursing note reviewed.   Constitutional:       General: He is not in acute distress.  Cardiovascular:      Rate and Rhythm: Normal rate and regular rhythm.      Pulses: Normal pulses.   Pulmonary:      Effort: Pulmonary effort is normal.   Abdominal:      General: Bowel sounds are normal. There is no distension.   Musculoskeletal:         General: No swelling.      Comments: Pain to palpation RLE and with any movement. Wounds dressed to RLE foot.   Neurological:      Mental Status: He is alert and oriented to person, place, and time. Mental status is at baseline.   Psychiatric:         Mood and Affect: Mood normal.         Thought Content: Thought content normal.             Significant Labs: All pertinent labs within the past 24 hours have been reviewed.    Significant Imaging: I have reviewed all pertinent imaging results/findings within the past 24 hours.

## 2025-02-17 NOTE — NURSING
Pt AAO x 4, free from falls/injury, and able to make needs known during shift. VSS on RA. IV heparin continued per dr's orders. Pt had c/o pain to the R leg during shift. PRN meds given as ordered.  Telemetry monitoring continued. CHG bath given. No acute distress noted. Bed locked and in lowest position. Bedside table and call light in reach.

## 2025-02-17 NOTE — NURSING
APTT labs returned 60.2 sec. APTT ordered for morning labs 02/18.     Physical Therapy     Referred by: Derrick Tan MD; Medical Diagnosis (from order):    Diagnosis Information      Diagnosis    V43.64 (ICD-9-CM) - Z96.642 (ICD-10-CM) - Status post left hip replacement                Visit Type: Discharge Summary    Visit:  4   Diagnosis Precautions: Date of Surgery: 4/14/2022; Surgery: Left Anterior Total Hip Arthroplasty - Left          SUBJECTIVE                                                                                                               Patient states some of the pain might be coming from side stepping with band around ankles.   Clam shell type movement causes pain.     Pain doesn't last long  Functional Change: No longer using assistive device  Current functional limitations: Pain with transitional movements  Pain with too much standing/walking (hasn't been walking much - requires a lot of walking at work)  Concerned with lifting + walking  Pain / Symptoms:  Pain rating (out of 10): Current: 0 ; Worst: 3    OBJECTIVE                                                                                                                     Observation:   Comments / Details: Pain with lunge type movement in posterior left gluteals; also had pain in posterior inferior gluteals with resisted clam shell.   Range of Motion (ROM)   (degrees unless noted; active unless noted; norms in ( ); negative=lacking to 0, positive=beyond 0)   Hip:      - Flexion (100-120):        • Left: Passive: 110    - ABDuction (40):        • Left: Passive: 20    - Internal Rotation in supine (45-50):        • Left: Passive: 5    - External Rotation in supine (45-50):        • Left: Passive: 30  Knee:    - Flexion (150):        • Left: WNL    - Extension (0-10):        • Left: WNL  Details / Comments: Decrease lateral deviation of left hip in frontal plane      Palpation:   Comments / Details: Taught muscle fibers through left hip flexor       Outcome Measures:   Lower Extremity Functional Scale:  LEFS Calculated Total: 70 (0=extreme difficulty; 80=no difficulty) see flowsheet for additional documentation    TREATMENT                                                                                                                  Therapeutic Exercise:  Recumbent bike 6 minutes level 3  Hooklying bridge 5 second holds x 20  Bent knee fall out on the left x 10  Step up leading left 6 step \" x 20  Lunge onto BOSU leading L   Side to side weight shifting to the left; limited by right hip   Hip drop in SLS with cues to drop knee and move hip laterally while keeping     Brief practice of reciprocal arm swing with heel strike to minimize limp        Manual Therapy:  Strain counterstrain to left hip flexor   Hip mobilizations   Hip flexor stretch   Gentle circumduction  Gentle abduction stretch    Skilled input: verbal instruction/cues and tactile instruction/cues    Writer verbally educated and received verbal consent for hand placement, positioning of patient, and techniques to be performed today from patient for clothing adjustments for techniques as described above and how they are pertinent to the patient's plan of care.    Home Exercise Program/Education Materials: Access Code: [email or web address]  URL: [email or web address]  Date: 05/03/2022  Prepared by: Lida Rhodes    Exercises  · Supine Hamstring Stretch with Strap - 2-3 x daily - 7 x weekly - 3 sets - 30 hold  · Seated Hip Hinge with Dowel - 2 x daily - 7 x weekly - 3 sets - 10 reps  · Sit to Stand - 2 x daily - 7 x weekly - 3 sets - 10 reps  · Bridge with Abduction and Resistance Loop - 1 x daily - 7 x weekly - 1-2 sets - 20 reps - 5 hold  · Side stepping with resistance (knees to ankle) 2-3 min or to fatigue  · Step Up - 1 x daily - 7 x weekly - 2 sets - 15 reps     Decrease glute exercises to 4x/week and allow for rest days.            ASSESSMENT                                                                                                              Patient's motion is acceptable and strength continues to improve. Gait deviations are noted and likely due to stiffness and ongoing weakness as well as pain that will improve with healing. Most functional strength activities are limited by right hip pain. Will benefit from continued practice of strength exercises and progression prior to likely discharge next visit.  To date the patient has made gains as expected as reported. Patient continues to have impairments and functional deficits as noted.  Patient will continue to benefit from skilled care as outlined.  Patient Education:   Results of above outlined education: Verbalizes understanding and Demonstrates understanding      PLAN                                                                                                                           Updates to plan of care: continue current plan of care    Suggestions for next session as indicated: Nu step, review Home exercise program, standing progression, lunge progression         GOALS                                                                                                                           decrease pain/stiffness to less than 1/10  improved involved hip internal rotation external rotation ROM to within 5° of normal  improve involved strength to at least 4+/5 for involved extremity  The above improvements in impairments to assist in obtaining goals listed below  Long Term Goals: to be met by end of plan of care  1. Patient will be independent with progressed and modified home exercise program. Status: progressing/ongoing  2. Patient will have sufficient hip stability to perform a controlled squat to utilize lower storage for household chores and comfortably complete transitional movements and transfers to low or soft surfaces.  Status: progressing/ongoing  3. Patient will be able to ascend and descend a full flight of stairs unlimited by pain or weakness.  Status:  progressing/ongoing  4. Patient will demonstrate ability to negotiate level and unlevel surfaces at variable velocities, including change of direction without increased pain or instability to return to age appropriate and community activities at prior level of function.  Status: progressing/ongoing      Therapy procedure time and total treatment time can be found documented on the Time Entry flowsheet

## 2025-02-17 NOTE — PLAN OF CARE
Pt remained free of falls during current shift. Pt had (R) Total hip repaired. No c/o pain.  Pt appeared to be in no distress and did not receive any prn pain medications. IV fluids was given per MD order. Plan of care and fall precautions reviewed with pt and verbalized understanding. Bed locked, lowered, SR up x2 and call light placed within reach.          Problem: Adult Inpatient Plan of Care  Goal: Plan of Care Review  Outcome: Progressing     Problem: Diabetes Comorbidity  Goal: Blood Glucose Level Within Targeted Range  Outcome: Progressing     Problem: Wound  Goal: Optimal Coping  Outcome: Progressing     Problem: Fall Injury Risk  Goal: Absence of Fall and Fall-Related Injury  Outcome: Progressing     Problem: Hemodialysis  Goal: Safe, Effective Therapy Delivery  Outcome: Progressing

## 2025-02-18 ENCOUNTER — TELEPHONE (OUTPATIENT)
Dept: VASCULAR SURGERY | Facility: CLINIC | Age: 70
End: 2025-02-18
Payer: MEDICARE

## 2025-02-18 LAB
ANION GAP SERPL CALC-SCNC: 16 MMOL/L (ref 8–16)
APTT PPP: 31.9 SEC (ref 21–32)
BASOPHILS # BLD AUTO: 0.02 K/UL (ref 0–0.2)
BASOPHILS NFR BLD: 0.3 % (ref 0–1.9)
BUN SERPL-MCNC: 44 MG/DL (ref 8–23)
CALCIUM SERPL-MCNC: 8.5 MG/DL (ref 8.7–10.5)
CHLORIDE SERPL-SCNC: 95 MMOL/L (ref 95–110)
CO2 SERPL-SCNC: 26 MMOL/L (ref 23–29)
CREAT SERPL-MCNC: 8.6 MG/DL (ref 0.5–1.4)
DIFFERENTIAL METHOD BLD: ABNORMAL
EOSINOPHIL # BLD AUTO: 0 K/UL (ref 0–0.5)
EOSINOPHIL NFR BLD: 0.2 % (ref 0–8)
ERYTHROCYTE [DISTWIDTH] IN BLOOD BY AUTOMATED COUNT: 14.1 % (ref 11.5–14.5)
EST. GFR  (NO RACE VARIABLE): 6 ML/MIN/1.73 M^2
FERRITIN SERPL-MCNC: 3794 NG/ML (ref 20–300)
GLUCOSE SERPL-MCNC: 130 MG/DL (ref 70–110)
HCT VFR BLD AUTO: 27.8 % (ref 40–54)
HGB BLD-MCNC: 9.4 G/DL (ref 14–18)
IMM GRANULOCYTES # BLD AUTO: 0.03 K/UL (ref 0–0.04)
IMM GRANULOCYTES NFR BLD AUTO: 0.5 % (ref 0–0.5)
IRON SERPL-MCNC: 25 UG/DL (ref 45–160)
LYMPHOCYTES # BLD AUTO: 0.6 K/UL (ref 1–4.8)
LYMPHOCYTES NFR BLD: 8.7 % (ref 18–48)
MAGNESIUM SERPL-MCNC: 2 MG/DL (ref 1.6–2.6)
MCH RBC QN AUTO: 27.2 PG (ref 27–31)
MCHC RBC AUTO-ENTMCNC: 33.8 G/DL (ref 32–36)
MCV RBC AUTO: 81 FL (ref 82–98)
MONOCYTES # BLD AUTO: 0.6 K/UL (ref 0.3–1)
MONOCYTES NFR BLD: 9.5 % (ref 4–15)
NEUTROPHILS # BLD AUTO: 5.2 K/UL (ref 1.8–7.7)
NEUTROPHILS NFR BLD: 80.8 % (ref 38–73)
NRBC BLD-RTO: 0 /100 WBC
OHS QRS DURATION: 74 MS
OHS QTC CALCULATION: 448 MS
PHOSPHATE SERPL-MCNC: 6 MG/DL (ref 2.7–4.5)
PLATELET # BLD AUTO: 143 K/UL (ref 150–450)
PMV BLD AUTO: 9.3 FL (ref 9.2–12.9)
POCT GLUCOSE: 142 MG/DL (ref 70–110)
POTASSIUM SERPL-SCNC: 5.1 MMOL/L (ref 3.5–5.1)
RBC # BLD AUTO: 3.45 M/UL (ref 4.6–6.2)
SATURATED IRON: 14 % (ref 20–50)
SODIUM SERPL-SCNC: 137 MMOL/L (ref 136–145)
TOTAL IRON BINDING CAPACITY: 178 UG/DL (ref 250–450)
TRANSFERRIN SERPL-MCNC: 120 MG/DL (ref 200–375)
WBC # BLD AUTO: 6.45 K/UL (ref 3.9–12.7)

## 2025-02-18 PROCEDURE — 97530 THERAPEUTIC ACTIVITIES: CPT | Mod: HCNC

## 2025-02-18 PROCEDURE — 85730 THROMBOPLASTIN TIME PARTIAL: CPT | Mod: HCNC | Performed by: ORTHOPAEDIC SURGERY

## 2025-02-18 PROCEDURE — 84100 ASSAY OF PHOSPHORUS: CPT | Mod: HCNC | Performed by: ORTHOPAEDIC SURGERY

## 2025-02-18 PROCEDURE — 85025 COMPLETE CBC W/AUTO DIFF WBC: CPT | Mod: HCNC | Performed by: ORTHOPAEDIC SURGERY

## 2025-02-18 PROCEDURE — 25000003 PHARM REV CODE 250: Mod: HCNC | Performed by: ORTHOPAEDIC SURGERY

## 2025-02-18 PROCEDURE — 82728 ASSAY OF FERRITIN: CPT | Mod: HCNC | Performed by: STUDENT IN AN ORGANIZED HEALTH CARE EDUCATION/TRAINING PROGRAM

## 2025-02-18 PROCEDURE — 83735 ASSAY OF MAGNESIUM: CPT | Mod: HCNC | Performed by: ORTHOPAEDIC SURGERY

## 2025-02-18 PROCEDURE — 63600175 PHARM REV CODE 636 W HCPCS: Mod: HCNC | Performed by: INTERNAL MEDICINE

## 2025-02-18 PROCEDURE — 25000003 PHARM REV CODE 250: Mod: HCNC | Performed by: STUDENT IN AN ORGANIZED HEALTH CARE EDUCATION/TRAINING PROGRAM

## 2025-02-18 PROCEDURE — 63600175 PHARM REV CODE 636 W HCPCS: Mod: HCNC | Performed by: ORTHOPAEDIC SURGERY

## 2025-02-18 PROCEDURE — 97162 PT EVAL MOD COMPLEX 30 MIN: CPT | Mod: HCNC

## 2025-02-18 PROCEDURE — 80048 BASIC METABOLIC PNL TOTAL CA: CPT | Mod: HCNC | Performed by: ORTHOPAEDIC SURGERY

## 2025-02-18 PROCEDURE — 99223 1ST HOSP IP/OBS HIGH 75: CPT | Mod: HCNC,,,

## 2025-02-18 PROCEDURE — 84466 ASSAY OF TRANSFERRIN: CPT | Mod: HCNC | Performed by: STUDENT IN AN ORGANIZED HEALTH CARE EDUCATION/TRAINING PROGRAM

## 2025-02-18 PROCEDURE — 21400001 HC TELEMETRY ROOM: Mod: HCNC

## 2025-02-18 PROCEDURE — 97166 OT EVAL MOD COMPLEX 45 MIN: CPT | Mod: HCNC

## 2025-02-18 RX ORDER — ONDANSETRON HYDROCHLORIDE 2 MG/ML
4 INJECTION, SOLUTION INTRAVENOUS EVERY 6 HOURS PRN
Status: DISCONTINUED | OUTPATIENT
Start: 2025-02-18 | End: 2025-02-27 | Stop reason: HOSPADM

## 2025-02-18 RX ORDER — PANTOPRAZOLE SODIUM 40 MG/10ML
40 INJECTION, POWDER, LYOPHILIZED, FOR SOLUTION INTRAVENOUS 2 TIMES DAILY
Status: COMPLETED | OUTPATIENT
Start: 2025-02-18 | End: 2025-02-19

## 2025-02-18 RX ORDER — PANTOPRAZOLE SODIUM 40 MG/1
40 TABLET, DELAYED RELEASE ORAL 2 TIMES DAILY
Status: DISCONTINUED | OUTPATIENT
Start: 2025-02-20 | End: 2025-02-27 | Stop reason: HOSPADM

## 2025-02-18 RX ADMIN — SEVELAMER CARBONATE 800 MG: 800 TABLET, FILM COATED ORAL at 12:02

## 2025-02-18 RX ADMIN — Medication 1 CAPSULE: at 08:02

## 2025-02-18 RX ADMIN — PANTOPRAZOLE SODIUM 40 MG: 40 TABLET, DELAYED RELEASE ORAL at 08:02

## 2025-02-18 RX ADMIN — ONDANSETRON 4 MG: 2 INJECTION INTRAMUSCULAR; INTRAVENOUS at 06:02

## 2025-02-18 RX ADMIN — ATORVASTATIN CALCIUM 80 MG: 40 TABLET, FILM COATED ORAL at 08:02

## 2025-02-18 RX ADMIN — Medication 324 MG: at 08:02

## 2025-02-18 RX ADMIN — SEVELAMER CARBONATE 800 MG: 800 TABLET, FILM COATED ORAL at 08:02

## 2025-02-18 RX ADMIN — SEVELAMER CARBONATE 800 MG: 800 TABLET, FILM COATED ORAL at 05:02

## 2025-02-18 RX ADMIN — APIXABAN 5 MG: 5 TABLET, FILM COATED ORAL at 08:02

## 2025-02-18 RX ADMIN — ONDANSETRON 4 MG: 2 INJECTION INTRAMUSCULAR; INTRAVENOUS at 01:02

## 2025-02-18 RX ADMIN — MUPIROCIN: 20 OINTMENT TOPICAL at 08:02

## 2025-02-18 RX ADMIN — CEFAZOLIN 2 G: 2 INJECTION, POWDER, FOR SOLUTION INTRAMUSCULAR; INTRAVENOUS at 06:02

## 2025-02-18 RX ADMIN — FINASTERIDE 5 MG: 5 TABLET, FILM COATED ORAL at 08:02

## 2025-02-18 RX ADMIN — TAMSULOSIN HYDROCHLORIDE 0.8 MG: 0.4 CAPSULE ORAL at 08:02

## 2025-02-18 RX ADMIN — OXYCODONE 10 MG: 5 TABLET ORAL at 01:02

## 2025-02-18 RX ADMIN — PANTOPRAZOLE SODIUM 40 MG: 40 INJECTION, POWDER, LYOPHILIZED, FOR SOLUTION INTRAVENOUS at 08:02

## 2025-02-18 RX ADMIN — ASPIRIN 81 MG CHEWABLE TABLET 81 MG: 81 TABLET CHEWABLE at 08:02

## 2025-02-18 RX ADMIN — EZETIMIBE 10 MG: 10 TABLET ORAL at 08:02

## 2025-02-18 RX ADMIN — HYDROCODONE BITARTRATE AND ACETAMINOPHEN 1 TABLET: 5; 325 TABLET ORAL at 06:02

## 2025-02-18 NOTE — NURSING
"Pt in dialysis, pt reporting "feeling tired" noted left arm AV fistula in use. Heparin infusing at 17units/kg/hr, dual sign off done. Right hip dressing is dry, clean and intact. Instructed pt to let dialysis nurse know if need RN.     [facility]  Nurses Note -- 4 Eyes      2/17/2025       Skin assessed on: Q Shift      [] No Pressure Injuries Present    []Prevention Measures Documented    [x] Yes LDA  for Pressure Injury Previously documented     [] Yes New Pressure Injury Discovered   [] LDA for New Pressure Injury Added      Attending RN:  Tayla Tong, DARRION     Second RN:  JIM Sherwood       "

## 2025-02-18 NOTE — PT/OT/SLP EVAL
Physical Therapy Evaluation    Patient Name:  Miguel Moore   MRN:  [id number]    Recommendations:     Discharge Recommendations: Moderate Intensity Therapy   Discharge Equipment Recommendations: none   Barriers to discharge home:  Pt with decreased safety awareness, increased fall risks, and decreased functional mobility/ambulation at this time.    Assessment:     Miguel Moore is a 69 y.o. male admitted with a medical diagnosis of Closed fracture of right hip.  He presents with the following impairments/functional limitations: weakness, impaired endurance, impaired sensation, impaired self care skills, impaired functional mobility, gait instability, impaired balance, decreased coordination, decreased upper extremity function, decreased lower extremity function, decreased safety awareness, pain, abnormal tone, decreased ROM, impaired coordination, impaired fine motor, impaired skin, edema, impaired joint extensibility, impaired muscle length, orthopedic precautions.    Rehab Prognosis: Fair; patient would benefit from acute skilled PT services to address these deficits and reach maximum level of function.    Recent Surgery: Procedure(s) (LRB):  ARTHROPLASTY, HIP, TOTAL, USING COMPUTER-ASSISTED NAVIGATION (Right) 1 Day Post-Op    Plan:     During this hospitalization, patient to be seen 6 x/week to address the identified rehab impairments via gait training, therapeutic activities, therapeutic exercises, wheelchair management/training and progress toward the following goals:    Plan of Care Expires:  03/04/25    Subjective     Chief Complaint: pain and nausea  Patient/Family Comments/goals: Pt agreeable to therapy.   Pain/Comfort:  Pain Rating 1: 5/10  Location - Side 1: Right  Location 1: leg  Pain Addressed 1: Nurse notified, Reposition, Distraction, Cessation of Activity (Nurse holding pain meds 2* pt with low BP)      Living Environment:  Pt lives with dtr and grand-dtr [facility] with ~2 small steps at entry.    Prior to admission, patients level of function was mod I with short distances ambulation using SBQC.  Equipment at home: hospital bed, wheelchair, cane, quad, bedside commode, shower chair, grab bar.    Upon discharge, patient will have assistance from dtr, grand-dtr, and fiance.    Objective:     Communicated with nurse Angelica prior to session.  Patient found left sidelying with peripheral IV, telemetry, pressure relief boots  upon PT entry to room.    General Precautions: Standard, fall, diabetic, seizure (ESRD on HD MWF)  Orthopedic Precautions:RLE weight bearing as tolerated, RLE posterior precautions   Braces: N/A  Respiratory Status: Room air    Exams:  Cognitive Exam:  Patient was able to follow 2 step commands.   Gross Motor Coordination:  impaired  Postural Exam:  Patient presented with the following abnormalities:    -       RUE flexion contractures  Sensation:    -       Intact  light/touch BLE, h/o DM neuropathy to B feet  Skin Integrity/Edema:      -       Skin integrity: chronic healing R plantar foot wounds?; Pt reported no restrictions with ambulation and wears house slippers with  at home, no Darco shoes.   -       Edema: Mild BLE  BLE ROM: LLE WFL except slight decreased L knee extension; RLE limited 2* h/o CVA  BLE Strength: LLE WFL; RLE knee ext grossly 3-/5, absence of R ankle DF/PF and toes flex/ext (Pt reported no RLE AFO.)      Functional Mobility:  Bed Mobility:     Scooting: contact guard assistance  Supine to Sit: contact guard assistance  Transfers:     Sit to Stand: maximal assistance and of 2 persons with quad cane x2 trials; Pt with posterior trunk lean, required extra time to bring COG over MERON with proper foot placement.     Bed to chair: Pt declined bedside chair for lunch, would like to sit EOB instead.   Gait: Pt ambulated/pivoted ~3 ft along bedside with max A of 2 persons using QC.  Pt mostly pivoting on LLE, decreased WB through RLE.  Pt with narrow MERON, decreased weight  shifting, decreased foot clearance, decreased step length, and decreased john.   Balance: Pt with fair static sit and poor static standing balance.       AM-PAC 6 CLICK MOBILITY  Total Score:13       Treatment & Education:  Pt educated on acute skilled PT services and goals.  Pt educated in posterior hip precautions: no bending/flexing surgical hip >90 degrees, no crossing of surgical leg beyond midline, and no internal rotation of surgical leg beyond neutral (don't twist body toward surgical leg).  Pt verbalized understanding.     Balance Training  Static Standing:  Patient performed static standing on level surface  using  QC  with Maximal Assistance of 2 persons and maximal verbal cues for posture, proper foot placement, and standing tolerance x2 trials.       Patient left sitting edge of bed with all lines intact, call button in reach, and nurse Angelica and PCT Gita notified.  Tray table in front.     GOALS:   Multidisciplinary Problems       Physical Therapy Goals          Problem: Physical Therapy    Goal Priority Disciplines Outcome Interventions   Physical Therapy Goal     PT, PT/OT Progressing    Description: Goals to be met by: 3/4/25     Patient will increase functional independence with mobility by performin. Supine to sit with supervision  2. Rolling to Left and Right with supervision  3. Sit to stand transfer with Minimal Assistance using Quad Cane  4. Bed to chair transfer with Minimal Assistance   5. Gait x10 feet with Minimal Assistance using Quad Cane   6. Wheelchair propulsion x50 feet with Minimal Assistance using left upper/lower extremity  7. Lower extremity exercise program 2 sets x10 reps per handout, with supervision                           History:     Past Medical History:   Diagnosis Date    Allergy     Clotting disorder     Eliquis and Plavix    Deep vein thrombosis     Diabetes mellitus, type 2     Hypertension     Nuclear sclerosis of both eyes 2017    Renal  disorder     Seizures     Stroke     Urinary tract infection        Past Surgical History:   Procedure Laterality Date    CATARACT EXTRACTION W/  INTRAOCULAR LENS IMPLANT Right 10/05/2017    Dr. Tay    CATARACT EXTRACTION W/  INTRAOCULAR LENS IMPLANT Left 10/19/2017    Dr. Tay    CYSTOSCOPY W/ RETROGRADES Bilateral 2/18/2021    Procedure: CYSTOSCOPY, WITH RETROGRADE PYELOGRAM;  Surgeon: JEMMA Styles MD;  Location: [facility] OR;  Service: Urology;  Laterality: Bilateral;  REQUESTING EARLY AS POSSIBE-LO / 2/8/2021 @ 1:13PM  RN Pre Op 2-11-21, Covid NEGATIVE ON  2-17-21.  C A    ESOPHAGOGASTRODUODENOSCOPY N/A 8/17/2020    Procedure: EGD (ESOPHAGOGASTRODUODENOSCOPY);  Surgeon: Desmond Chapman MD;  Location: [facility];  Service: Endoscopy;  Laterality: N/A;    ESOPHAGOGASTRODUODENOSCOPY N/A 8/21/2024    Procedure: EGD (ESOPHAGOGASTRODUODENOSCOPY);  Surgeon: Tonie Chauhan MD;  Location: [facility] ENDO;  Service: Endoscopy;  Laterality: N/A;    ESOPHAGOGASTRODUODENOSCOPY N/A 12/5/2024    Procedure: EGD (ESOPHAGOGASTRODUODENOSCOPY);  Surgeon: Tonie Chauhan MD;  Location: [facility] ENDO;  Service: Endoscopy;  Laterality: N/A;  Bren Ovalles, standard prep, mailed , Eliquis, Dialysis LAB, ASam  ok to hold Eliquis 2 days per Dr Raymond-GT  11/8/24- pc complete. DBM  11/13 pt r/s, Eliquis hold x 2 days per Dr. Raymond see t/e 10/29/24, Dialysis MWF, K+ labs in AM, mailed -ml  11/27 precall com    EYE SURGERY Bilateral     cataract    FEMORAL ARTERY STENT      FRACTURE SURGERY      INCISION AND DRAINAGE, LOWER EXTREMITY Right 4/4/2024    Procedure: INCISION AND DRAINAGE, LOWER EXTREMITY;  Surgeon: Jimbo Montilla III, MD;  Location: [facility] OR;  Service: Orthopedics;  Laterality: Right;    INCISION AND DRAINAGE, LOWER EXTREMITY Right 4/6/2024    Procedure: INCISION AND DRAINAGE, LOWER EXTREMITY;  Surgeon: Desmond Barillas MD;  Location: [facility] OR;  Service: Orthopedics;  Laterality: Right;  foot and ankle    INCISION AND  DRAINAGE, LOWER EXTREMITY Right 4/9/2024    Procedure: INCISION AND DRAINAGE, LOWER EXTREMITY- FOOT & ANKLE;  Surgeon: Jimbo Montilla III, MD;  Location: [facility] OR;  Service: Orthopedics;  Laterality: Right;  CULTURES, VASCHE    INCISION AND DRAINAGE, LOWER EXTREMITY Right 5/21/2024    Procedure: INCISION AND DRAINAGE, LOWER EXTREMITY;  Surgeon: Jimbo Montilla III, MD;  Location: [facility] OR;  Service: Orthopedics;  Laterality: Right;  Pulsavac and Vashe    KNEE SURGERY      bilateral scope    TOTAL REPLACEMENT OF HIP JOINT USING COMPUTER-ASSISTED NAVIGATION Right 2/17/2025    Procedure: ARTHROPLASTY, HIP, TOTAL, USING COMPUTER-ASSISTED NAVIGATION;  Surgeon: Chris Quesada MD;  Location: [facility] OR;  Service: Orthopedics;  Laterality: Right;  Vistaar. ADD ON TO FOLLOW MY CASES    WOUND DRESSING Right 4/9/2024    Procedure: WOUND VAC EXCHANGE;  Surgeon: Jimbo Montilla III, MD;  Location: [facility] OR;  Service: Orthopedics;  Laterality: Right;       Time Tracking:     PT Received On: 02/18/25  PT Start Time: 1131     PT Stop Time: 1201  PT Total Time (min): 30 min     Billable Minutes: Evaluation 20 min co-eval with OT and Therapeutic Activity 10 min      02/18/2025

## 2025-02-18 NOTE — PLAN OF CARE
Problem: Physical Therapy  Goal: Physical Therapy Goal  Description: Goals to be met by: 3/4/25     Patient will increase functional independence with mobility by performin. Supine to sit with supervision  2. Rolling to Left and Right with supervision  3. Sit to stand transfer with Minimal Assistance using Quad Cane  4. Bed to chair transfer with Minimal Assistance   5. Gait x10 feet with Minimal Assistance using Quad Cane   6. Wheelchair propulsion x50 feet with Minimal Assistance using left upper/lower extremity  7. Lower extremity exercise program 2 sets x10 reps per handout, with supervision    Outcome: Progressing     Pt will benefit from moderate intensity therapy.

## 2025-02-18 NOTE — PROGRESS NOTES
02/17/25 1945   Vital Signs   Pulse (!) 113   BP (!) 83/71   During Hemodialysis Assessment   Blood Flow Rate (mL/min) 300 mL/min   Dialysate Flow Rate (mL/min) 600 ml/min   Ultrafiltration Rate (mL/Hr) 50 mL/Hr   Arteriovenous Lines Secure Yes   Arterial Pressure (mmHg) -144 mmHg   Venous Pressure (mmHg) 132   UF Removed (mL) 1371 mL   TMP 5   Venous Line in Air Detector Yes   Intake (mL) 0 mL   Transducer Dry Yes   Access Visible Yes    notified of access issue? N/A   Heparin given? N/A   Intra-Hemodialysis Comments   (contacted Dr. Sandhu. Per Dr Sandhu may rinse back blood. Add 400mLof Saline on topof rinse back volume)   Post-Hemodialysis Assessment   Rinseback Volume (mL) 250 mL   Blood Volume Processed (Liters) 52.5 L   Dialyzer Clearance Clear   Duration of Treatment 165 minutes   Additional Fluid Intake (mL) 900 mL   Total UF (mL) 1371 mL   Net Fluid Removal -471   Patient Response to Treatment hypotensive   Arterial bleeding stop time (min) 10 min   Venous bleeding stop time (min) 10 min

## 2025-02-18 NOTE — NURSING
[facility] [place]  Nurses Note -- 4 Eyes      2/18/2025       Skin assessed on: Q Shift      [] No Pressure Injuries Present    []Prevention Measures Documented    [x] Yes LDA  for Pressure Injury Previously documented     [] Yes New Pressure Injury Discovered   [] LDA for New Pressure Injury Added      Attending RN:  Angelica Chavarria LPN     Second RN:  DARRION Bourne

## 2025-02-18 NOTE — ANESTHESIA POSTPROCEDURE EVALUATION
Anesthesia Post Evaluation    Patient: Miguel Moore    Procedure(s) Performed: Procedure(s) (LRB):  ARTHROPLASTY, HIP, TOTAL, USING COMPUTER-ASSISTED NAVIGATION (Right)    Final Anesthesia Type: general      Patient location during evaluation: floor  Patient participation: Yes- Able to Participate  Level of consciousness: awake and alert  Post-procedure vital signs: reviewed and stable  Pain management: adequate  Airway patency: patent    PONV status at discharge: No PONV  Anesthetic complications: no      Cardiovascular status: hemodynamically stable and blood pressure returned to baseline  Respiratory status: room air, spontaneous ventilation and unassisted  Hydration status: euvolemic  Follow-up not needed.              Vitals Value Taken Time   /61 02/18/25 11:42   Temp 37.1 °C (98.8 °F) 02/18/25 11:42   Pulse 79 02/18/25 14:48   Resp 18 02/18/25 13:12   SpO2 97 % 02/18/25 11:42         Event Time   Out of Recovery 02/17/2025 17:00:00         Pain/Skyler Score: Pain Rating Prior to Med Admin: 9 (2/18/2025  1:12 PM)  Pain Rating Post Med Admin: 7 (2/17/2025  7:26 AM)  Skyler Score: 9 (2/17/2025  4:45 PM)

## 2025-02-18 NOTE — PLAN OF CARE
Problem: Adult Inpatient Plan of Care  Goal: Plan of Care Review  Outcome: Progressing     Problem: Diabetes Comorbidity  Goal: Blood Glucose Level Within Targeted Range  Outcome: Progressing     Problem: Wound  Goal: Optimal Coping  Outcome: Progressing  Goal: Absence of Infection Signs and Symptoms  Outcome: Progressing  Goal: Optimal Wound Healing  Outcome: Progressing     Problem: Fall Injury Risk  Goal: Absence of Fall and Fall-Related Injury  Outcome: Progressing     Problem: Hip Arthroplasty  Goal: Absence of Bleeding  Outcome: Progressing  Goal: Absence of Infection Signs and Symptoms  Outcome: Progressing  Goal: Acceptable Pain Control  Outcome: Progressing  Goal: Nausea and Vomiting Relief  Outcome: Progressing  Goal: Effective Oxygenation and Ventilation  Outcome: Progressing

## 2025-02-18 NOTE — CONSULTS
[facility] Surg  Wound Care  [facility]    Patient Name:  Miguel Moore   MRN:  [id number]  Date: 2/18/2025  Diagnosis: Closed fracture of right hip    History:     Past Medical History:   Diagnosis Date    Allergy     Clotting disorder     Eliquis and Plavix    Deep vein thrombosis     Diabetes mellitus, type 2     Hypertension     Nuclear sclerosis of both eyes 06/09/2017    Renal disorder     Seizures     Stroke     Urinary tract infection        Social History[1]    Precautions:     Allergies as of 02/15/2025 - Reviewed 02/15/2025   Allergen Reaction Noted    Ace inhibitors  09/03/2018    Penicillins Hives 01/13/2015    Tizanidine  06/30/2020       [facility] Assessment Details/Treatment     Active Problem List with Overview Notes    Diagnosis Date Noted    Closed fracture of right hip 02/15/2025    Acute blood loss anemia (ABLA) 08/23/2024    Wounds, multiple 08/22/2024    Upper GI bleed 8/23/24 coil embolization L gastric artery 08/21/2024 8/17/20 EGD - Normal esophagus. - Discolored, nodular and texture changed mucosa in the antrum. - Normal examined duodenum.  Path negative  8/21/24 EGD  Normal esophagus. - Gastric ulcer with oozing hemorrhage (Mauricio Class Ib). Clip was placed. Clip : ETHERA. hemostatic spray applied. - Clotted blood in the gastric fundus. - Normal examined duodenum. - No specimens collected.   8/23/24 CTA No CTA evidence of acute/active gastrointestinal hemorrhage. However, there is moderately intense mucosal enhancement in the region of the gastric fundus on pre contrast as well as arterial and venous phases which may reflect the reported gastric ulcer noted in the region of the gastroesophageal junction on recent endoscopy. Consider IR consult for angiogram and elective embolization of the gastric arteries supplying this region.   8/23/24 coil embolization L gastric artery      Peripheral artery disease 5/16/24 LE angio with angioplasty popliteal artery 04/25/2024      3/20/24 RLE arterial US Arterial vasculature of the right lower extremity is patent.  However, there is evidence of atherosclerotic change with stenosis at the level of popliteal artery.   5/16/24 LE arterial US  Sonogram suggest hemodynamically significant stenoses in the posterior tibial, peroneal and dorsalis pedis arteries.   5/16/24 L and R angiogram Balloon angioplasty of the popliteal artery with 5x60 Ultraverse balloon      Abdominal aortic atherosclerosis on CT 4/1/24 04/25/2024    Paroxysmal atrial fibrillation 03/20/2024     3/20/24 TTE LV normal systolic function LVEF 55-60%.  Converted to sinus rhythm on amiodarone   Amiodarone stopped on hospital discharge due to possible contributor to transaminitis      Bilateral posterior capsular opacification 05/02/2023    Pseudophakia 01/06/2022    History of diabetic ulcer of foot     Elevated PSA 2/18/21 prostate bx normal 02/18/2021 2/18/21 prostate bx normal  1/25/24 MRI prostate PIRADS 2      Pulmonary nodule 1/2021 4 mm nodule. 01/06/2021 1/6/2021 CT abd/pelvis: 4 mm nodule in the right middle lobe      Chronic deep vein thrombosis (DVT) of popliteal vein of right lower extremity 9/21/20 outside US; unprovoked; anticoagulation 09/21/2020    History of fungal infection candida parasilosis hospitalization 8/2020 08/29/2020     -Found to have candidemia - source unclear  -infectious disease consulted, Started on micafungin and now converted to fluconazole  -Repeat cultures negative so far  -Dialysis line removed 8/28.   line replaced on 08/31 after line holiday.  -end date of fluconazole will be 09/11/2020.  Patient has ophthalmology follow-up scheduled on 09/08/2020. Tentative end date 9/11/2020 If no endophthalmitis. If noted to have endophthalmitis during optho visit, would need at least 4-6 weeks of treatment      Anemia in chronic kidney disease 08/26/2020    CME (cystoid macular edema), bilateral 11/16/2017    Refractive error 11/16/2017     Senile nuclear sclerosis 10/05/2017    Right sided weakness 09/11/2017    Secondary hyperparathyroidism of renal origin 08/03/2017    Vitamin D deficiency 08/03/2017    Nuclear sclerosis, left 06/09/2017    Cortical cataract of both eyes 06/09/2017    DM type 2 without retinopathy 06/09/2017    Microcytosis 9/2019 labs c/w AoCD and AoCKD 03/22/2017     Seen on admission as well 2015; normal Hb, low MCV.  Normal RDW  08/17/2020 EGD normal esophagus.  Discolored nodular and texture change mucosa in the antrum.  Normal duodenum.  Path with foveolar hyperplasia and early xanthoma formation.  H pylori negative.  Mild chronic inflammation without acute activity      ESRD (end stage renal disease) 03/22/2017 2/27/20 renal US with dopplers no ALF.  Medical renal disease  8/2020 renal US: 1. Symmetric diffusely increased cortical echogenicity and elevated resistive indices, nonspecific indicators of chronic medical renal disease.  2. Prostatomegaly.  Some of the provided images are suggestive of a distinct hyperechoic component of the prostate gland, of uncertain etiology or significance, and potentially artifactual.  Dedicated prostate ultrasound or MRI may be of benefit for further characterization.    Started on HD while hospitalized for progression to ESRD 8/2020  -Continue dialysis per nephrology  -Outpatient HD has been arranged  -Had planned discharge 8/27 if remained afebrile and cultures negative.  Unfortunately his blood culture grew Candida parapsilosis  -Dr. Gomez with ID consulted and case discussed with him.  Started micafungin 8/27 and now on fluconazole.  -Dialysis line removed after HD 8/28 and plan line holiday over weekend.  -new line by IR on 8/31, tolerated dialysis fluid.  -continue outpatient dialysis.      Benign essential HTN 03/21/2017 01/06/2021 TTE mild left atrial enlargement.  LV normal size and systolic function LVEF 55%.  Indeterminate diastolic function.  Mild right atrial  enlargement.  Normal RV systolic function.  Normal RV size.  PA pressure 20.  Normal CVP.  Three.      Dyslipidemia 03/21/2017    BPH (benign prostatic hyperplasia) 2/18/21 prostate bx normal 03/21/2017 6/26/2017 renal US mod prostatomegaly.      Seizure disorder as sequela of cerebrovascular accident 03/21/2017    Hemiplegia and hemiparesis following cerebral infarction affecting right dominant side 03/21/2017      Consulted for altered skin integrity right lower extremity  A 69 year old male admitted 2/15/25 from home with complaint of a fall at home onto right hip. Ambulates with walker at home  2/18 WBC 6.45 Hgb 9.4 Hct 27.8   2/15 Alb 3.4 Weight 72.9 kg  12/9/24 A1C 5.8  On Isoflex mattress; Aiden score 16  2/15 8:15 pm 4 Eyes Skin Assessment- Pressure Injury Previously documented- pressure injury left heel & medial buttock  Photodocumentation (from Media 2/15)    Sacrum    Left heel    Right plantar foot  2/17 S/P Right primary total hip arthroplasty per Dr. Quesada for right hip femoral neck fracture  Assessment:  Left heel- Scar/healed pressure injury  Right plantar foot- Three chronic wounds I&D sites from 5/21/24 per Dr. Montilla; full thickness wounds with red base. Scant serous drainage.   Openings 2-3 cm each. [facility] changes dressing twice a week.   Wounds on heel and lateral lower leg healed.   Treatment/Plan:  Local wound care to right plantar foot wounds every Tuesday and Friday and prn with Vashe to cleanse. Pack openings with Aquacel Ag hydrofiber, covered with dry gauze and secured with Kerlix roll.   Nursing to change dressing every Tuesday and Friday.  Use Darco sandal over dressing for ambulation.   Treatment plan discussed with patient and nursing.   Recommendations made to primary team . Orders placed.     02/18/2025         [1]   Social History  Socioeconomic History    Marital status: Single   Occupational History    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, etc    Tobacco Use    Smoking status: Never    Smokeless tobacco: Never   Substance and Sexual Activity    Alcohol use: No    Drug use: No   Social History Narrative    Lives with daughter     Social Drivers of Health     Financial Resource Strain: Low Risk  (2/16/2025)    Overall Financial Resource Strain (CARDIA)     Difficulty of Paying Living Expenses: Not hard at all   Recent Concern: Financial Resource Strain - Medium Risk (2/15/2025)    Overall Financial Resource Strain (CARDIA)     Difficulty of Paying Living Expenses: Somewhat hard   Food Insecurity: No Food Insecurity (2/16/2025)    Hunger Vital Sign     Worried About Running Out of Food in the Last Year: Never true     Ran Out of Food in the Last Year: Never true   Recent Concern: Food Insecurity - Food Insecurity Present (2/15/2025)    Hunger Vital Sign     Worried About Running Out of Food in the Last Year: Sometimes true     Ran Out of Food in the Last Year: Sometimes true   Transportation Needs: Unmet Transportation Needs (9/10/2024)    PRAPARE - Transportation     Lack of Transportation (Medical): Yes     Lack of Transportation (Non-Medical): No   Physical Activity: Insufficiently Active (9/10/2024)    Exercise Vital Sign     Days of Exercise per Week: 4 days     Minutes of Exercise per Session: 10 min   Stress: No Stress Concern Present (2/15/2025)    [employer] - Occupational Stress Questionnaire     Feeling of Stress : Not at all   Housing Stability: Low Risk  (2/16/2025)    Housing Stability Vital Sign     Unable to Pay for Housing in the Last Year: No     Homeless in the Last Year: No

## 2025-02-18 NOTE — NURSING
Bedside Report given to night nurse DARRION Bourne. Heparin handoff 17 units/kg/hr. Walking rounds completed. Visualized and assessed patient NAD noted. Safety precautions maintained and call light within reach.     Chart check completed.

## 2025-02-18 NOTE — PROGRESS NOTES
"Addendum: Patient reports movement of his lower extremity/foot prior to fall. Pre-op exam he was really unable to move anything in his foot. I thought this may be effort induced but continues to not move in his distal extremity. Will see how this improves with time but was not a result of surgery. Also with foot wounds and active drainage, wound care following for this. Will discuss anticoagulation for patient, heparin drip was continued. Ok to restart home eloquis dose 3-5 days post op.         ORTHOPAEDIC POSTOP PROGRESS NOTE       Subjective   Patient states that they are doing well from a pain standpoint. Has not ambulated yet.  Dorsiflexion/Plantarflexion similar to pre-operative examination, patient reports this was his baseline prior to fall. Reports he was able to ambulate with cane piror to fall.     Objective   VITAL SIGNS: BP (!) 89/47   Pulse 70   Temp 98.5 °F (36.9 °C) (Oral)   Resp 18   Ht 5' 8" (1.727 m)   Wt 72.9 kg (160 lb 11.5 oz)   SpO2 96%   BMI 24.44 kg/m²    INTAKE AND OUTPUT:   Intake/Output Summary (Last 24 hours) at 2/18/2025 0805  Last data filed at 2/18/2025 0629  Gross per 24 hour   Intake 2070 ml   Output 1671 ml   Net 399 ml        PHYSICAL EXAMINATION:  Right Lower Extremity:    Dorsalis pedis pulses palpable     Dorsi flexion 1/5 strength, same as pre-op    Plantar flexion 1/5 strength, same as pre-op     Extensor hallucis extension: 1/5 strength, same as pre-op    Sensory intact to light touch L1-S1    Dressing clean/dry/intact    LABS: Reviewed      Assessment/Plan:    Weight Bearing As Tolerated  23 hours IV Abx  Posterior hip precautions  Aquacel x7days  PO Pain Meds  PT/OT  Coagulation per primary team   Nephrology consulted for hemodialysis, appreciate managed care   Dispo: per primary team       Problem List:   Patient Active Problem List    Diagnosis Date Noted    Closed fracture of right hip 02/15/2025    Acute blood loss anemia (ABLA) 08/23/2024    Wounds, multiple " 08/22/2024    Upper GI bleed 8/23/24 coil embolization L gastric artery 08/21/2024    Peripheral artery disease 5/16/24 LE angio with angioplasty popliteal artery 04/25/2024    Abdominal aortic atherosclerosis on CT 4/1/24 04/25/2024    Paroxysmal atrial fibrillation 03/20/2024    Bilateral posterior capsular opacification 05/02/2023    Pseudophakia 01/06/2022    History of diabetic ulcer of foot     Elevated PSA 2/18/21 prostate bx normal 02/18/2021    Pulmonary nodule 1/2021 4 mm nodule. 01/06/2021    Chronic deep vein thrombosis (DVT) of popliteal vein of right lower extremity 9/21/20 outside [facility]; unprovoked; anticoagulation 09/21/2020    History of fungal infection candida parasilosis hospitalization 8/2020 08/29/2020    Anemia in chronic kidney disease 08/26/2020    CME (cystoid macular edema), bilateral 11/16/2017    Refractive error 11/16/2017    Senile nuclear sclerosis 10/05/2017    Right sided weakness 09/11/2017    Secondary hyperparathyroidism of renal origin 08/03/2017    Vitamin D deficiency 08/03/2017    Nuclear sclerosis, left 06/09/2017    Cortical cataract of both eyes 06/09/2017    DM type 2 without retinopathy 06/09/2017    Microcytosis 9/2019 labs c/w AoCD and AoCKD 03/22/2017    ESRD (end stage renal disease) 03/22/2017    Benign essential HTN 03/21/2017    Dyslipidemia 03/21/2017    BPH (benign prostatic hyperplasia) 2/18/21 prostate bx normal 03/21/2017    Seizure disorder as sequela of cerebrovascular accident 03/21/2017    Hemiplegia and hemiparesis following cerebral infarction affecting right dominant side 03/21/2017

## 2025-02-18 NOTE — CLINICAL REVIEW
Nephrology Chart Review      Intake/Output Summary (Last 24 hours) at 2/18/2025 1519  Last data filed at 2/18/2025 1259  Gross per 24 hour   Intake 1550 ml   Output 1471 ml   Net 79 ml       Vitals:    02/18/25 1046 02/18/25 1142 02/18/25 1312 02/18/25 1448   BP:  112/61     BP Location:  Right arm     Patient Position:  Lying     Pulse: 85 88  79   Resp:  18 18    Temp:  98.8 °F (37.1 °C)     TempSrc:  Oral     SpO2:  97%     Weight:       Height:           Recent Labs   Lab 02/16/25  0532 02/17/25  0437 02/18/25  0453    134* 137   K 4.5 5.0 5.1   CL 97 94* 95   CO2 24 22* 26   BUN 49* 60* 44*   CREATININE 10.0* 12.2* 8.6*   CALCIUM 8.5* 8.5* 8.5*   PHOS 6.2* 6.5* 6.0*     HD tomorrow      No other related issues identified. Please call Nephrology as needed; We will continue to follow.      Augustine Sandhu MD  [place] [facility] [place]

## 2025-02-18 NOTE — SUBJECTIVE & OBJECTIVE
Interval History:  S/P right  hip replacement arthroplasty on 02/17/2025.  Nephrology consulted for dialysis.PT,OT   Review of Systems   HENT:  Negative for congestion and dental problem.    Respiratory:  Negative for apnea.    Gastrointestinal:  Negative for abdominal distention.   Skin:  Negative for color change.   Neurological:  Positive for weakness.     Objective:     Vital Signs (Most Recent):  Temp: 98.5 °F (36.9 °C) (02/18/25 0715)  Pulse: 85 (02/18/25 1046)  Resp: 18 (02/18/25 0715)  BP: (!) 89/47 (02/18/25 0730)  SpO2: 96 % (02/18/25 0715) Vital Signs (24h Range):  Temp:  [97.3 °F (36.3 °C)-98.6 °F (37 °C)] 98.5 °F (36.9 °C)  Pulse:  [] 85  Resp:  [12-21] 18  SpO2:  [90 %-100 %] 96 %  BP: ()/(45-80) 89/47     Weight: 72.9 kg (160 lb 11.5 oz)  Body mass index is 24.44 kg/m².    Intake/Output Summary (Last 24 hours) at 2/18/2025 1114  Last data filed at 2/18/2025 0833  Gross per 24 hour   Intake 2310 ml   Output 1671 ml   Net 639 ml         Physical Exam  Vitals and nursing note reviewed.   Constitutional:       General: He is not in acute distress.  Cardiovascular:      Rate and Rhythm: Normal rate and regular rhythm.      Pulses: Normal pulses.   Pulmonary:      Effort: Pulmonary effort is normal.   Abdominal:      General: Bowel sounds are normal. There is no distension.   Musculoskeletal:         General: No swelling.      Comments: Pain to palpation RLE and with any movement. Wounds dressed to RLE foot.   Neurological:      Mental Status: He is alert and oriented to person, place, and time. Mental status is at baseline.   Psychiatric:         Mood and Affect: Mood normal.         Thought Content: Thought content normal.             Significant Labs: All pertinent labs within the past 24 hours have been reviewed.    Significant Imaging: I have reviewed all pertinent imaging results/findings within the past 24 hours.

## 2025-02-18 NOTE — PT/OT/SLP EVAL
Occupational Therapy   Evaluation    Name: Miguel Moore  MRN: [id number]  Admitting Diagnosis: Closed fracture of right hip  Recent Surgery: Procedure(s) (LRB):  ARTHROPLASTY, HIP, TOTAL, USING COMPUTER-ASSISTED NAVIGATION (Right) 1 Day Post-Op    Recommendations:     Discharge Recommendations: Moderate Intensity Therapy  Discharge Equipment Recommendations:  to be determined by next level of care  Barriers to discharge:  Inaccessible home environment, Decreased caregiver support    Assessment:     Miguel Moore is a 69 y.o. male with a medical diagnosis of Closed fracture of right hip.  He presents with some pain at rest and with WB through RLE. Performance deficits affecting function: weakness, impaired endurance, impaired self care skills, impaired functional mobility, gait instability, impaired balance, decreased lower extremity function, decreased upper extremity function, decreased coordination, pain, abnormal tone, impaired fine motor, impaired coordination, decreased ROM, impaired skin, impaired joint extensibility, impaired muscle length, orthopedic precautions.      Rehab Prognosis: Good; patient would benefit from acute skilled OT services to address these deficits and reach maximum level of function.       Plan:     Patient to be seen  (3-5x/week) to address the above listed problems via self-care/home management, therapeutic activities, therapeutic exercises  Plan of Care Expires: 03/18/25  Plan of Care Reviewed with: patient    Subjective     Chief Complaint: Pt reports he is usually very independent at home and daughter assists him when ever needed  Patient/Family Comments/goals: To return to PLOF    Occupational Profile:  Living Environment: Pt lives with daughter and adult grand daughter in [address] with [place]  Previous level of function: PRN assistance fro dressing, reporting increased assistance for LE dressing than UE dressing. Pt reports he ambulates Mod I with small based quad cane for  toilet transfers and requires no assistance for toileting. Pt states he self feeds.   Roles and Routines: Caretaker to self. Family completes cooking, cleaning, driving, and grocery shopping.   Equipment Used at Home: wheelchair (small base quad cane)  Assistance upon Discharge: Daughter, grand daughter and fiance. Pt reports daughter works from home and fibessie is able to assist as needed    Pain/Comfort:  Pain Rating 1: 5/10  Location - Side 1: Right  Location - Orientation 1: generalized  Location 1: lip  Pain Addressed 1: Reposition, Distraction, Cessation of Activity  Pain Rating Post-Intervention 1: 5/10    Patients cultural, spiritual, Hoahaoism conflicts given the current situation:      Objective:     Communicated with: nsg prior to session.  Patient found HOB elevated with peripheral IV, telemetry, pressure relief boots upon OT entry to room.    General Precautions: Standard, fall  Orthopedic Precautions: RLE posterior precautions, RLE weight bearing as tolerated  Braces: N/A  Respiratory Status: Room air    Occupational Performance:    Bed Mobility:    Patient completed Rolling/Turning to Left with  contact guard assistance  Patient completed Scooting/Bridging with contact guard assistance  Patient completed Supine to Sit with contact guard assistance  Patient completed Sit to Supine with contact guard assistance    Functional Mobility/Transfers:  Patient completed Sit <> Stand Transfer with maximal assistance and of 2 persons  with  quad cane  x2 trials Pt with posterior trunk lean, required extra time to bring COG over MERON with proper foot placement.   Functional Mobility: Pt with fair dynamic seated and poor /poor+ static standing balance.     Activities of Daily Living:  Upper Body Dressing: maximal assistance to don gown as robe seated EOB  Lower Body Dressing: total assistance to don B socks supine in bed    Cognitive/Visual Perceptual:  Cognitive/Psychosocial Skills:     -       Oriented to:  Person, Place, Time and Situation   -       Follows Commands/attention:Follows multistep  commands  -       Communication: clear/fluent  -       Memory: No Deficits noted  -       Safety awareness/insight to disability: fairly intact   -       Mood/Affect/Coping skills/emotional control: Guarded at times, cooperative    Physical Exam:  Postural examination/scapula alignment:    -       Rounded shoulders, forward head  Skin integrity: RLE wounds not visualized, dressing c/d/I; LLE healing wound not visualized   Sensation:    -       Impaired diabetic neuropathy B feet  Motor Planning:    -      R hemiparesis  Dominant hand:    -      Uses L hand for functional tasks 2/2 RUE flexion contractures  Upper Extremity Range of Motion: RUE minimal PROM shoulder, and elbow, R hand flexion contracture; LUE WFL    Upper Extremity Strength:  RUE no AROM; LUE 3/5 shoulder flexion, distally 4 to 4+/5   Strength:  L hand WFL; R hand 0/5  Fine Motor Coordination:    -       Intact L hand  Gross motor coordination:   R hemiparesis at baseline    AMPAC 6 Click ADL:  AMPAC Total Score: 12    Treatment & Education:  Pt educated on role of OT and POC.   Pt performing skills as listed above.     Patient left sitting edge of bed with all lines intact, call button in reach, nsg notified, and nsg present    GOALS:   Multidisciplinary Problems       Occupational Therapy Goals          Problem: Occupational Therapy    Goal Priority Disciplines Outcome Interventions   Occupational Therapy Goal     OT, PT/OT Progressing    Description: Goals to be met by: 03/18/2025      Patient will increase functional independence with ADLs by performing:    UE Dressing with Minimal Assistance.  LE Dressing with Maximum Assistance.  Grooming while standing or seated with Minimal Assistance.  Toileting from bedside commode with Moderate Assistance for hygiene and clothing management.   Toilet transfer to bedside commode with Moderate Assistance.  Increased  functional strength to [facility] for self care LUE.  Upper extremity exercise program x10 reps per handout, with assistance as needed.                          History:     Past Medical History:   Diagnosis Date    Allergy     Clotting disorder     Eliquis and Plavix    Deep vein thrombosis     Diabetes mellitus, type 2     Hypertension     Nuclear sclerosis of both eyes 06/09/2017    Renal disorder     Seizures     Stroke     Urinary tract infection          Past Surgical History:   Procedure Laterality Date    CATARACT EXTRACTION W/  INTRAOCULAR LENS IMPLANT Right 10/05/2017    Dr. Tay    CATARACT EXTRACTION W/  INTRAOCULAR LENS IMPLANT Left 10/19/2017    Dr. Tay    CYSTOSCOPY W/ RETROGRADES Bilateral 2/18/2021    Procedure: CYSTOSCOPY, WITH RETROGRADE PYELOGRAM;  Surgeon: JEMMA Styles MD;  Location: [facility] OR;  Service: Urology;  Laterality: Bilateral;  REQUESTING EARLY AS POSSIBE-LO / 2/8/2021 @ 1:13PM  RN Pre Op 2-11-21, Covid NEGATIVE ON  2-17-21.  C A    ESOPHAGOGASTRODUODENOSCOPY N/A 8/17/2020    Procedure: EGD (ESOPHAGOGASTRODUODENOSCOPY);  Surgeon: Desmond Chapman MD;  Location: [facility] ENDO;  Service: Endoscopy;  Laterality: N/A;    ESOPHAGOGASTRODUODENOSCOPY N/A 8/21/2024    Procedure: EGD (ESOPHAGOGASTRODUODENOSCOPY);  Surgeon: Tonie Chauhan MD;  Location: [facility] ENDO;  Service: Endoscopy;  Laterality: N/A;    ESOPHAGOGASTRODUODENOSCOPY N/A 12/5/2024    Procedure: EGD (ESOPHAGOGASTRODUODENOSCOPY);  Surgeon: Tonie Chauhan MD;  Location: [facility] ENDO;  Service: Endoscopy;  Laterality: N/A;  Bren Ovalles, standard prep, mailed , Eliquis, Dialysis LAB, ASam  ok to hold Eliquis 2 days per Dr Raymond-GT  11/8/24- pc complete. DBM  11/13 pt r/s, Eliquis hold x 2 days per Dr. Raymond see t/e 10/29/24, Dialysis MWF, K+ labs in AM, mailed -ml  11/27 precall com    EYE SURGERY Bilateral     cataract    FEMORAL ARTERY STENT      FRACTURE SURGERY      INCISION AND DRAINAGE, LOWER EXTREMITY Right 4/4/2024     Procedure: INCISION AND DRAINAGE, LOWER EXTREMITY;  Surgeon: Jimbo Montilla III, MD;  Location: [facility] OR;  Service: Orthopedics;  Laterality: Right;    INCISION AND DRAINAGE, LOWER EXTREMITY Right 4/6/2024    Procedure: INCISION AND DRAINAGE, LOWER EXTREMITY;  Surgeon: Desmond Barillas MD;  Location: [facility] OR;  Service: Orthopedics;  Laterality: Right;  foot and ankle    INCISION AND DRAINAGE, LOWER EXTREMITY Right 4/9/2024    Procedure: INCISION AND DRAINAGE, LOWER EXTREMITY- FOOT & ANKLE;  Surgeon: Jimbo Montilla III, MD;  Location: [facility] OR;  Service: Orthopedics;  Laterality: Right;  CULTURES, VASCHE    INCISION AND DRAINAGE, LOWER EXTREMITY Right 5/21/2024    Procedure: INCISION AND DRAINAGE, LOWER EXTREMITY;  Surgeon: Jimbo Montilla III, MD;  Location: [facility] OR;  Service: Orthopedics;  Laterality: Right;  Pulsavac and Vashe    KNEE SURGERY      bilateral scope    WOUND DRESSING Right 4/9/2024    Procedure: WOUND VAC EXCHANGE;  Surgeon: Jimbo Montilla III, MD;  Location: [facility] OR;  Service: Orthopedics;  Laterality: Right;       Time Tracking:     OT Date of Treatment: 02/18/25  OT Start Time: 1135  OT Stop Time: 1202  OT Total Time (min): 27 min    Billable Minutes:Evaluation 14  Therapeutic Activity 13    2/18/2025

## 2025-02-18 NOTE — PLAN OF CARE
Problem: Occupational Therapy  Goal: Occupational Therapy Goal  Description: Goals to be met by: 03/18/2025      Patient will increase functional independence with ADLs by performing:    UE Dressing with Minimal Assistance.  LE Dressing with Maximum Assistance.  Grooming while standing or seated with Minimal Assistance.  Toileting from bedside commode with Moderate Assistance for hygiene and clothing management.   Toilet transfer to bedside commode with Moderate Assistance.  Increased functional strength to [facility] for self care LUE.  Upper extremity exercise program x10 reps per handout, with assistance as needed.     Outcome: Progressing    Pt would benefit from continued OT to address deficits in self care and functional mobility. Recommending moderate intensity therapy; DME needs TBD, likely defer to next level of care

## 2025-02-19 DIAGNOSIS — Z96.641 STATUS POST RIGHT HIP REPLACEMENT: Primary | ICD-10-CM

## 2025-02-19 PROCEDURE — 97110 THERAPEUTIC EXERCISES: CPT | Mod: HCNC,CQ

## 2025-02-19 PROCEDURE — 97535 SELF CARE MNGMENT TRAINING: CPT | Mod: HCNC,CO

## 2025-02-19 PROCEDURE — 63600175 PHARM REV CODE 636 W HCPCS: Mod: HCNC | Performed by: INTERNAL MEDICINE

## 2025-02-19 PROCEDURE — 80100014 HC HEMODIALYSIS 1:1: Mod: HCNC

## 2025-02-19 PROCEDURE — 21400001 HC TELEMETRY ROOM: Mod: HCNC

## 2025-02-19 PROCEDURE — 97530 THERAPEUTIC ACTIVITIES: CPT | Mod: HCNC,CQ

## 2025-02-19 PROCEDURE — 97530 THERAPEUTIC ACTIVITIES: CPT | Mod: HCNC,CO

## 2025-02-19 PROCEDURE — 25000003 PHARM REV CODE 250: Mod: HCNC | Performed by: ORTHOPAEDIC SURGERY

## 2025-02-19 PROCEDURE — 25000003 PHARM REV CODE 250: Mod: HCNC | Performed by: HOSPITALIST

## 2025-02-19 PROCEDURE — 25000003 PHARM REV CODE 250: Mod: HCNC | Performed by: STUDENT IN AN ORGANIZED HEALTH CARE EDUCATION/TRAINING PROGRAM

## 2025-02-19 RX ORDER — DOCUSATE SODIUM 100 MG/1
100 CAPSULE, LIQUID FILLED ORAL 2 TIMES DAILY
Status: DISCONTINUED | OUTPATIENT
Start: 2025-02-19 | End: 2025-02-27 | Stop reason: HOSPADM

## 2025-02-19 RX ORDER — SODIUM CHLORIDE 9 MG/ML
INJECTION, SOLUTION INTRAVENOUS ONCE
Status: CANCELLED | OUTPATIENT
Start: 2025-02-19 | End: 2025-02-19

## 2025-02-19 RX ORDER — POLYETHYLENE GLYCOL 3350 17 G/17G
17 POWDER, FOR SOLUTION ORAL 2 TIMES DAILY
Status: DISCONTINUED | OUTPATIENT
Start: 2025-02-19 | End: 2025-02-27 | Stop reason: HOSPADM

## 2025-02-19 RX ADMIN — FINASTERIDE 5 MG: 5 TABLET, FILM COATED ORAL at 08:02

## 2025-02-19 RX ADMIN — MUPIROCIN: 20 OINTMENT TOPICAL at 08:02

## 2025-02-19 RX ADMIN — APIXABAN 5 MG: 5 TABLET, FILM COATED ORAL at 08:02

## 2025-02-19 RX ADMIN — OXYCODONE 10 MG: 5 TABLET ORAL at 03:02

## 2025-02-19 RX ADMIN — POLYETHYLENE GLYCOL 3350 17 G: 17 POWDER, FOR SOLUTION ORAL at 11:02

## 2025-02-19 RX ADMIN — Medication 1 CAPSULE: at 08:02

## 2025-02-19 RX ADMIN — PANTOPRAZOLE SODIUM 40 MG: 40 INJECTION, POWDER, LYOPHILIZED, FOR SOLUTION INTRAVENOUS at 08:02

## 2025-02-19 RX ADMIN — ATORVASTATIN CALCIUM 80 MG: 40 TABLET, FILM COATED ORAL at 08:02

## 2025-02-19 RX ADMIN — ASPIRIN 81 MG CHEWABLE TABLET 81 MG: 81 TABLET CHEWABLE at 08:02

## 2025-02-19 RX ADMIN — PANTOPRAZOLE SODIUM 40 MG: 40 INJECTION, POWDER, LYOPHILIZED, FOR SOLUTION INTRAVENOUS at 10:02

## 2025-02-19 RX ADMIN — MUPIROCIN: 20 OINTMENT TOPICAL at 10:02

## 2025-02-19 RX ADMIN — DOCUSATE SODIUM 100 MG: 100 CAPSULE, LIQUID FILLED ORAL at 10:02

## 2025-02-19 RX ADMIN — Medication 324 MG: at 08:02

## 2025-02-19 RX ADMIN — EZETIMIBE 10 MG: 10 TABLET ORAL at 08:02

## 2025-02-19 RX ADMIN — SEVELAMER CARBONATE 800 MG: 800 TABLET, FILM COATED ORAL at 11:02

## 2025-02-19 RX ADMIN — TAMSULOSIN HYDROCHLORIDE 0.8 MG: 0.4 CAPSULE ORAL at 08:02

## 2025-02-19 RX ADMIN — DOCUSATE SODIUM 100 MG: 100 CAPSULE, LIQUID FILLED ORAL at 11:02

## 2025-02-19 RX ADMIN — SEVELAMER CARBONATE 800 MG: 800 TABLET, FILM COATED ORAL at 08:02

## 2025-02-19 RX ADMIN — APIXABAN 5 MG: 5 TABLET, FILM COATED ORAL at 10:02

## 2025-02-19 NOTE — CONSULTS
Thank you for your consult to [facility]. We have reviewed the patient chart. This patient does meet criteria for [facility] service at this time.  Will assume care on 02/20/25 at 7AM.

## 2025-02-19 NOTE — NURSING
[facility]  Nurses Note -- 4 Eyes      2/18/2025       Skin assessed on: Q Shift      [] No Pressure Injuries Present    []Prevention Measures Documented    [x] Yes LDA  for Pressure Injury Previously documented     [] Yes New Pressure Injury Discovered   [] LDA for New Pressure Injury Added      Attending RN:  Stephanie Barthelemy, RN     Second RN:  Angelica Chavarria LPN

## 2025-02-19 NOTE — SUBJECTIVE & OBJECTIVE
Interval History:  S/P right  hip replacement arthroplasty on 02/17/2025.  Nephrology consulted for dialysis.PT,OT .  Wound care is doing wound care on right plantar foot and left heel.  Review of Systems   HENT:  Negative for congestion and dental problem.    Respiratory:  Negative for apnea.    Gastrointestinal:  Negative for abdominal distention.   Skin:  Negative for color change.   Neurological:  Positive for weakness.     Objective:     Vital Signs (Most Recent):  Temp: 98 °F (36.7 °C) (02/19/25 1108)  Pulse: 87 (02/19/25 1108)  Resp: 18 (02/19/25 1108)  BP: 102/70 (02/19/25 1108)  SpO2: 98 % (02/19/25 1108) Vital Signs (24h Range):  Temp:  [97.7 °F (36.5 °C)-98.1 °F (36.7 °C)] 98 °F (36.7 °C)  Pulse:  [76-97] 87  Resp:  [18-20] 18  SpO2:  [93 %-99 %] 98 %  BP: (101-110)/(51-70) 102/70     Weight: 72.9 kg (160 lb 11.5 oz)  Body mass index is 24.44 kg/m².    Intake/Output Summary (Last 24 hours) at 2/19/2025 1151  Last data filed at 2/19/2025 0838  Gross per 24 hour   Intake 720 ml   Output --   Net 720 ml         Physical Exam  Vitals and nursing note reviewed.   Constitutional:       General: He is not in acute distress.  Cardiovascular:      Rate and Rhythm: Normal rate and regular rhythm.      Pulses: Normal pulses.   Pulmonary:      Effort: Pulmonary effort is normal.   Abdominal:      General: Bowel sounds are normal. There is no distension.   Musculoskeletal:         General: No swelling.      Comments: Pain to palpation RLE and with any movement. Wounds dressed to RLE foot.   Neurological:      Mental Status: He is alert and oriented to person, place, and time. Mental status is at baseline.   Psychiatric:         Mood and Affect: Mood normal.         Thought Content: Thought content normal.             Significant Labs: All pertinent labs within the past 24 hours have been reviewed.    Significant Imaging: I have reviewed all pertinent imaging results/findings within the past 24 hours.

## 2025-02-19 NOTE — ASSESSMENT & PLAN NOTE
Wound care at home for bilateral lower extremities.  Mainly right lower extremity, wound care comes Tuesdays and Thursdays.  We will consult Wound Care for further management.    Wound care is doing wound care on right plantar foot and left heel.

## 2025-02-19 NOTE — PLAN OF CARE
Problem: Adult Inpatient Plan of Care  Goal: Absence of Hospital-Acquired Illness or Injury  2/19/2025 0742 by Barthelemy, Stephanie, RN  Outcome: Progressing  2/19/2025 0646 by Barthelemy, Stephanie, RN  Outcome: Progressing  Intervention: Prevent Infection  2/19/2025 0742 by Barthelemy, Stephanie, RN  Flowsheets (Taken 2/19/2025 0742)  Infection Prevention:   cohorting utilized   environmental surveillance performed   hand hygiene promoted   rest/sleep promoted   single patient room provided  2/19/2025 0646 by Barthelemy, Stephanie, RN  Flowsheets (Taken 2/19/2025 0646)  Infection Prevention:   cohorting utilized   environmental surveillance performed   hand hygiene promoted   single patient room provided     Problem: Diabetes Comorbidity  Goal: Blood Glucose Level Within Targeted Range  2/19/2025 0742 by Barthelemy, Stephanie, RN  Outcome: Progressing  2/19/2025 0646 by Barthelemy, Stephanie, RN  Outcome: Progressing  Intervention: Monitor and Manage Glycemia  Flowsheets (Taken 2/19/2025 0742)  Glycemic Management: blood glucose monitored     Problem: Wound  Goal: Absence of Infection Signs and Symptoms  Outcome: Progressing  Intervention: Prevent or Manage Infection  Flowsheets (Taken 2/19/2025 0646)  Infection Management: aseptic technique maintained  Isolation Precautions: protective     Problem: Fall Injury Risk  Goal: Absence of Fall and Fall-Related Injury  Outcome: Progressing  Intervention: Promote Injury-Free Environment  Flowsheets (Taken 2/19/2025 0742)  Safety Promotion/Fall Prevention:   assistive device/personal item within reach   bed alarm set   lighting adjusted   medications reviewed   room near unit station

## 2025-02-19 NOTE — PT/OT/SLP PROGRESS
Physical Therapy Treatment    Patient Name:  Miguel Moore   MRN:  [id number]    Recommendations:     Discharge Recommendations: Moderate Intensity Therapy  Discharge Equipment Recommendations: none  Barriers to discharge: None    Assessment:     Miguel Moore is a 69 y.o. male admitted with a medical diagnosis of Closed fracture of right hip.  He presents with the following impairments/functional limitations: weakness, impaired endurance, impaired self care skills, impaired functional mobility, gait instability, impaired balance, decreased coordination, decreased upper extremity function, decreased lower extremity function, pain, decreased safety awareness, decreased ROM, orthopedic precautions, impaired skin, edema, impaired coordination .    Rehab Prognosis: Fair; patient would benefit from acute skilled PT services to address these deficits and reach maximum level of function.    Recent Surgery: Procedure(s) (LRB):  ARTHROPLASTY, HIP, TOTAL, USING COMPUTER-ASSISTED NAVIGATION (Right) 2 Days Post-Op    Plan:     During this hospitalization, patient to be seen 6 x/week to address the identified rehab impairments via gait training, therapeutic activities, therapeutic exercises, wheelchair management/training and progress toward the following goals:    Plan of Care Expires:  03/04/25    Subjective     Chief Complaint: fatigue   Patient/Family Comments/goals: pt is agreeable to therapy   Pain/Comfort:  Pain Rating 1: 0/10  Pain Rating Post-Intervention 1: 0/10      Objective:     Communicated with nurse prior to session.  Patient found HOB elevated with telemetry, peripheral IV, bed alarm upon PT entry to room.     General Precautions: Standard, fall, seizure, diabetic  Orthopedic Precautions: RLE weight bearing as tolerated, RLE posterior precautions  Braces: R Darco shoe   Respiratory Status: Room air     Functional Mobility:   Bed Mobility:     Rolling Left:  contact guard assistance  Scooting: contact guard  assistance  Bridging: contact guard assistance  Supine to Sit: contact guard assistance and minimum assistance  Sit to Supine: contact guard assistance and minimum assistance  Transfers:  V/T cues for safety , proper technique and L hand placement to push off from bed .    Sit to Stand: x  4 trials bed  moderate assistance with hand-held assist and quad cane  Gait: pt ambulated ~ 2 side steps with LUE supported , MAX A( pt required R knee blocked ) .     Balance:  good in sitting, poor in standing       AM-PAC 6 CLICK MOBILITY  Turning over in bed (including adjusting bedclothes, sheets and blankets)?: 3  Sitting down on and standing up from a chair with arms (e.g., wheelchair, bedside commode, etc.): 2  Moving from lying on back to sitting on the side of the bed?: 3  Moving to and from a bed to a chair (including a wheelchair)?: 2  Need to walk in hospital room?: 2  Climbing 3-5 steps with a railing?: 1  Basic Mobility Total Score: 13       Treatment & Education:  Instructed / encouraged pt to perform BLE AP, GS, QS throughout the day . Pt verbalized understanding.   Educated pt on posterior hip precautions. Pt verbalized understanding .   Lower Extremity Exercises.    Patient educated on: Purpose and Benefits of Therapeutic Exercise(s).    Patient verbalized acceptance/understanding of instruction(s), expectation(s), and limitation(s) (for safety).  Patient performed: 2 sets of 10 reps (each) of B LE Ther Ex: AP, LAQ and LLE only x 15 reps : hip flexion while sitting up on EOB.       Patient required  verbal cues/tactile cues to ensure correct sequence, to maintain proper form, and to allow for self-correction.    Patient required increase Reaction Time to initiate movements and a Sitting Rest Break between set(s) to recover.      Patient left HOB elevated with pressure relief boots to BLE ( pt declined to sit up EOB or chair, requested to rest prior to going to dialysis ) all lines intact, call button in reach,  bed alarm on, and nurse  notified..    GOALS:   Multidisciplinary Problems       Physical Therapy Goals          Problem: Physical Therapy    Goal Priority Disciplines Outcome Interventions   Physical Therapy Goal     PT, PT/OT Progressing    Description: Goals to be met by: 3/4/25     Patient will increase functional independence with mobility by performin. Supine to sit with supervision  2. Rolling to Left and Right with supervision  3. Sit to stand transfer with Minimal Assistance using Quad Cane  4. Bed to chair transfer with Minimal Assistance   5. Gait x10 feet with Minimal Assistance using Quad Cane   6. Wheelchair propulsion x50 feet with Minimal Assistance using left upper/lower extremity  7. Lower extremity exercise program 2 sets x10 reps per handout, with supervision                         DME Justifications:  No DME recommended requiring DME justifications    Time Tracking:     PT Received On: 25  PT Start Time: 1005     PT Stop Time: 1038  PT Total Time (min): 33 min     Billable Minutes: Therapeutic Activity 23, Therapeutic Exercise 10, and Total Time 33 min with OT     Treatment Type: Treatment  PT/PTA: PTA     Number of PTA visits since last PT visit: 2025

## 2025-02-19 NOTE — PLAN OF CARE
Problem: Adult Inpatient Plan of Care  Goal: Plan of Care Review  Outcome: Progressing  Goal: Patient-Specific Goal (Individualized)  Outcome: Progressing     Problem: Wound  Goal: Optimal Coping  Outcome: Progressing     Problem: Fall Injury Risk  Goal: Absence of Fall and Fall-Related Injury  Outcome: Progressing

## 2025-02-19 NOTE — PLAN OF CARE
Changes in medical condition or discharge plan:    PT/OT recommending SNF. CM talked to him about the recommendation and level of care, but he appeared to be hesitant to go. I talked to him about doing home health - he was with [facility] prior to admission - but pt asked about going to outpatient therapy. Pt stated it would be okay if I spoke to his daughter about the recommendations.    1320: CM called pt's daughter Maddy in attempt to discuss SNF recs, but had to leave a voicemail. Will follow up.    1540: CM sent SNF referrals to [facility] in the event pt is in agreement to go to SNF.    Does patient need new DME? TBD    Follow up appts needed: PCP    Medically stable: not at this time    Estimated Discharge Date: unknown     02/19/25 1321   Discharge Reassessment   Assessment Type Discharge Planning Reassessment   Did the patient's condition or plan change since previous assessment? Yes   Discharge Plan discussed with: Patient   Communicated GARY with patient/caregiver Date not available/Unable to determine   Discharge Plan A Skilled Nursing Facility   Discharge Plan B Home Health   DME Needed Upon Discharge  other (see comments)  (TBD)   Transition of Care Barriers Transportation   Why the patient remains in the hospital Requires continued medical care   Post-Acute Status   Post-Acute Authorization Placement   Coverage [employer]   Discharge Delays None known at this time

## 2025-02-19 NOTE — PROGRESS NOTES
"ORTHOPAEDIC POSTOP PROGRESS NOTE       Subjective   Patient states that they are doing well from a pain standpoint. Able to stand yesterday with PT.     Objective   VITAL SIGNS: /66 (BP Location: Right arm, Patient Position: Lying)   Pulse 97   Temp 97.7 °F (36.5 °C) (Oral)   Resp 18   Ht 5' 8" (1.727 m)   Wt 72.9 kg (160 lb 11.5 oz)   SpO2 98%   BMI 24.44 kg/m²    INTAKE AND OUTPUT:   Intake/Output Summary (Last 24 hours) at 2/19/2025 1059  Last data filed at 2/19/2025 0838  Gross per 24 hour   Intake 720 ml   Output --   Net 720 ml        PHYSICAL EXAMINATION:  Right Lower Extremity:    Dorsalis pedis pulses palpable     Dorsi flexion 1/5 strength, same as pre-op    Plantar flexion 1/5 strength, same as pre-op     Extensor hallucis extension: 1/5 strength, same as pre-op    Sensory intact to light touch L1-S1    Dressing clean/dry/intact    LABS: Reviewed      Assessment/Plan:    Exam without movement of knee. Again, pre-op exam he was really unable to move anything in his foot. I thought this may be effort induced but continues to not move in his distal extremity. Will see how this improves with time but was not a result of surgery. Also with foot wounds and active drainage, wound care following for this.     Will discuss anticoagulation for patient, heparin drip was continued. Ok to restart home eloquis dose 3-5 days post op.     Dorsiflexion/Plantarflexion similar to pre-operative examination, patient reports this was his baseline prior to fall. Reports he was able to ambulate with cane piror to fall.     Weight Bearing As Tolerated  Posterior hip precautions  Aquacel x7days, changed PRN thereafter  PO Pain Meds  PT/OT  Coagulation per primary team   Nephrology consulted for hemodialysis, appreciate managed care   Dispo: per primary team. Follow up with me in 3 weeks.       Problem List:   Patient Active Problem List    Diagnosis Date Noted    Closed fracture of right hip 02/15/2025    Acute blood " loss anemia (ABLA) 08/23/2024    Wounds, multiple 08/22/2024    Upper GI bleed 8/23/24 coil embolization L gastric artery 08/21/2024    Peripheral artery disease 5/16/24 LE angio with angioplasty popliteal artery 04/25/2024    Abdominal aortic atherosclerosis on CT 4/1/24 04/25/2024    Paroxysmal atrial fibrillation 03/20/2024    Bilateral posterior capsular opacification 05/02/2023    Pseudophakia 01/06/2022    History of diabetic ulcer of foot     Elevated PSA 2/18/21 prostate bx normal 02/18/2021    Pulmonary nodule 1/2021 4 mm nodule. 01/06/2021    Chronic deep vein thrombosis (DVT) of popliteal vein of right lower extremity 9/21/20 outside US; unprovoked; anticoagulation 09/21/2020    History of fungal infection candida parasilosis hospitalization 8/2020 08/29/2020    Anemia in chronic kidney disease 08/26/2020    CME (cystoid macular edema), bilateral 11/16/2017    Refractive error 11/16/2017    Senile nuclear sclerosis 10/05/2017    Right sided weakness 09/11/2017    Secondary hyperparathyroidism of renal origin 08/03/2017    Vitamin D deficiency 08/03/2017    Nuclear sclerosis, left 06/09/2017    Cortical cataract of both eyes 06/09/2017    DM type 2 without retinopathy 06/09/2017    Microcytosis 9/2019 labs c/w AoCD and AoCKD 03/22/2017    ESRD (end stage renal disease) 03/22/2017    Benign essential HTN 03/21/2017    Dyslipidemia 03/21/2017    BPH (benign prostatic hyperplasia) 2/18/21 prostate bx normal 03/21/2017    Seizure disorder as sequela of cerebrovascular accident 03/21/2017    Hemiplegia and hemiparesis following cerebral infarction affecting right dominant side 03/21/2017

## 2025-02-19 NOTE — PT/OT/SLP PROGRESS
Occupational Therapy   Treatment    Name: Miguel Moore  MRN: [id number]  Admitting Diagnosis:  Closed fracture of right hip  2 Days Post-Op    Recommendations:     Discharge Recommendations: Moderate Intensity Therapy  Discharge Equipment Recommendations:  to be determined by next level of care  Barriers to discharge: none      Assessment:     Miguel Moore is a 69 y.o. male with a medical diagnosis of Closed fracture of right hip.  He presents with the following performance deficits affecting function: weakness, gait instability, decreased upper extremity function, decreased lower extremity function, impaired balance, impaired endurance, decreased safety awareness, impaired self care skills, decreased coordination, abnormal tone, decreased ROM, impaired coordination, impaired fine motor, orthopedic precautions, impaired functional mobility.     Rehab Prognosis:  Fair; patient would benefit from acute skilled OT services to address these deficits and reach maximum level of function.       Plan:     Patient to be seen  (3-5x/week) to address the above listed problems via self-care/home management, therapeutic activities, therapeutic exercises  Plan of Care Expires: 03/18/25  Plan of Care Reviewed with: patient    Subjective     Chief Complaint: fatigue  Patient/Family Comments/goals: Pt agreeable to therapy.  Pain/Comfort:  Pain Rating 1: 0/10    Objective:     Communicated with: Nurse prior to session.  Patient found HOB elevated with telemetry, bed alarm, peripheral IV upon OT entry to room.    General Precautions: Standard, fall    Orthopedic Precautions:RLE posterior precautions, RLE weight bearing as tolerated  Braces: R Darco shoe  Respiratory Status: Room air     Occupational Performance:     Bed Mobility:    Patient completed Rolling/Turning to Left with  contact guard assistance  Patient completed Rolling/Turning to Right with contact guard assistance  Patient completed Scooting/Bridging with stand by  assistance  Patient completed Supine to Sit with minimum assistance  Patient completed Sit to Supine with minimum assistance     Functional Mobility/Transfers:  Patient completed Sit <> Stand Transfer with moderate assistance  with  quad cane  and HHA x 4 trials from EOB  Functional Mobility: ~2 side steps with LUE supported and R knee block with MAX A and QC    Activities of Daily Living:  Grooming: stand by assistance /set up to wash face seated EOB and perform simple oral care. Pt uses one handed technique 2* R hemiparesis  Lower Body Dressing: dependence to don R darco shoe      [facility] 6 Click ADL: 12    Treatment & Education:  Bed mobility, functional transfer/mobility , and ADL completed as noted above.   Pt educated on safety awareness with all OOB mobility and ADL .   Pt tolerated sitting EOB with SBA for sitting balance during ADL's and therex    Patient left HOB elevated with all lines intact, call button in reach, bed alarm on, and nurse notified. Pt declined sitting EOB or in chair 2* requesting to rest prior to HD    GOALS:   Multidisciplinary Problems       Occupational Therapy Goals          Problem: Occupational Therapy    Goal Priority Disciplines Outcome Interventions   Occupational Therapy Goal     OT, PT/OT Progressing    Description: Goals to be met by: 03/18/2025      Patient will increase functional independence with ADLs by performing:    UE Dressing with Minimal Assistance.  LE Dressing with Maximum Assistance.  Grooming while standing or seated with Minimal Assistance.  Toileting from bedside commode with Moderate Assistance for hygiene and clothing management.   Toilet transfer to bedside commode with Moderate Assistance.  Increased functional strength to WFL for self care LUE.  Upper extremity exercise program x10 reps per handout, with assistance as needed.                          DME Justifications:  No DME recommended requiring DME justifications    Time Tracking:     OT Date of  Treatment: 02/19/25  OT Start Time: 1005  OT Stop Time: 1037  OT Total Time (min): 32 min    Billable Minutes:Self Care/Home Management 15  Therapeutic Activity 8    OT/GENEVA: GENEVA     Number of GENEVA visits since last OT visit: 1    2/19/2025

## 2025-02-19 NOTE — PROGRESS NOTES
[facility] Medicine  Progress Note    Patient Name: Miguel Moore  MRN: [id number]  Patient Class: IP- Inpatient   Admission Date: 2/15/2025  Length of Stay: 4 days  Attending Physician: Braden Heredia, *  Primary Care Provider: Raciel Raymond MD        Subjective     Principal Problem:Closed fracture of right hip        HPI:  Is a 69-year-old male with history of CVA, hypertension, ESRD on HD, DVT on Eliquis who presents to the emergency department for evaluation after a fall.  Patient reports he gets around with a walker and he slipped and fell to his right hip.  Denies loss of consciousness, hitting of his head or other recent issues.  In the ED, Xray of right hip positive for acute right femoral neck fracture. CT confirmed. Orthopedic Surgery consulted. Plan for OR on 2/17/25. He will be admitted to hospital medicine for further management.     Overview/Hospital Course:  Mr. Moore is a 69-year-old male who was admitted with a right femoral neck fracture.  Orthopedic surgery consulted.S/P right  hip replacement arthroplasty on 02/17/2025.  Nephrology consulted for dialysis.  Wound care is doing wound care on right plantar foot and left heel.  PT,OT ,plan for SNF.    Interval History:  S/P right  hip replacement arthroplasty on 02/17/2025.  Nephrology consulted for dialysis.PT,OT .  Wound care is doing wound care on right plantar foot and left heel.  Review of Systems   HENT:  Negative for congestion and dental problem.    Respiratory:  Negative for apnea.    Gastrointestinal:  Negative for abdominal distention.   Skin:  Negative for color change.   Neurological:  Positive for weakness.     Objective:     Vital Signs (Most Recent):  Temp: 98 °F (36.7 °C) (02/19/25 1108)  Pulse: 87 (02/19/25 1108)  Resp: 18 (02/19/25 1108)  BP: 102/70 (02/19/25 1108)  SpO2: 98 % (02/19/25 1108) Vital Signs (24h Range):  Temp:  [97.7 °F (36.5 °C)-98.1 °F (36.7 °C)] 98 °F (36.7 °C)  Pulse:  [76-97]  87  Resp:  [18-20] 18  SpO2:  [93 %-99 %] 98 %  BP: (101-110)/(51-70) 102/70     Weight: 72.9 kg (160 lb 11.5 oz)  Body mass index is 24.44 kg/m².    Intake/Output Summary (Last 24 hours) at 2/19/2025 1151  Last data filed at 2/19/2025 0838  Gross per 24 hour   Intake 720 ml   Output --   Net 720 ml         Physical Exam  Vitals and nursing note reviewed.   Constitutional:       General: He is not in acute distress.  Cardiovascular:      Rate and Rhythm: Normal rate and regular rhythm.      Pulses: Normal pulses.   Pulmonary:      Effort: Pulmonary effort is normal.   Abdominal:      General: Bowel sounds are normal. There is no distension.   Musculoskeletal:         General: No swelling.      Comments: Pain to palpation RLE and with any movement. Wounds dressed to RLE foot.   Neurological:      Mental Status: He is alert and oriented to person, place, and time. Mental status is at baseline.   Psychiatric:         Mood and Affect: Mood normal.         Thought Content: Thought content normal.             Significant Labs: All pertinent labs within the past 24 hours have been reviewed.    Significant Imaging: I have reviewed all pertinent imaging results/findings within the past 24 hours.    Assessment and Plan     * Closed fracture of right hip  Patient presents with a fall, subsequent right femoral neck fracture.  Orthopedic surgery consulted, plan for hip replacement on 02/17/2025.  Continue with supportive care and pain control.    - s/p hip replacement on 2/17/25  - PT/OT  - pain control    Wounds, multiple  Wound care at home for bilateral lower extremities.  Mainly right lower extremity, wound care comes Tuesdays and Thursdays.  We will consult Wound Care for further management.    Wound care is doing wound care on right plantar foot and left heel.    Paroxysmal atrial fibrillation  Patient has paroxysmal (<7 days) atrial fibrillation. Patient is currently in sinus rhythm. [id number] Score: 3. The patients  "heart rate in the last 24 hours is as follows:  Pulse  Min: 85  Max: 96     Antiarrhythmics       Anticoagulants  heparin 25,000 units in dextrose 5% (100 units/ml) IV bolus from bag LOW INTENSITY nomogram - [facility], Once, Intravenous  heparin 25,000 units in dextrose 5% 250 mL (100 units/mL) infusion LOW INTENSITY nomogram - [facility], Continuous, Intravenous  heparin 25,000 units in dextrose 5% (100 units/ml) IV bolus from bag LOW INTENSITY nomogram - [facility], As needed (PRN), Intravenous  heparin 25,000 units in dextrose 5% (100 units/ml) IV bolus from bag LOW INTENSITY nomogram - [facility], As needed (PRN), Intravenous    Plan  - Replete lytes with a goal of K>4, Mg >2  - Patient is anticoagulated, see medications listed above.  - Patient's afib is currently controlled          Chronic deep vein thrombosis (DVT) of popliteal vein of right lower extremity 9/21/20 outside US; unprovoked; anticoagulation  Patient on Eliquis at home.  Transitioned to heparin prior to surgery.  - resume eliquis       DM type 2 without retinopathy  Patient's FSGs are controlled on current medication regimen.  Last A1c reviewed-   Lab Results   Component Value Date    HGBA1C 5.8 12/09/2024     Most recent fingerstick glucose reviewed- No results for input(s): "POCTGLUCOSE" in the last 24 hours.  Current correctional scale   none    ESRD (end stage renal disease)  Creatine stable for now. BMP reviewed- noted Estimated Creatinine Clearance: 7.7 mL/min (A) (based on SCr of 8.6 mg/dL (H)). according to latest data. Based on current GFR, CKD stage is end stage.  Monitor UOP and serial BMP and adjust therapy as needed. Renally dose meds. Avoid nephrotoxic medications and procedures.    Dialysis Monday Wednesday Friday.  Has left arm fistula.  Nephrology consulted.    Hemiplegia and hemiparesis following cerebral infarction affecting right dominant side  Noted      BPH (benign prostatic hyperplasia) 2/18/21 prostate bx normal  Resume home medications        VTE " Risk Mitigation (From admission, onward)           Ordered     Place JJ hose  Until discontinued         02/17/25 2224     apixaban tablet 5 mg  2 times daily         02/17/25 1610     IP VTE HIGH RISK PATIENT  Once         02/15/25 1525     Place sequential compression device  Until discontinued         02/15/25 1525                    Discharge Planning   GARY:      Code Status: Full Code   Medical Readiness for Discharge Date:   Discharge Plan A: Home                Please place Justification for DME        Braden Heredia MD  Department of Hospital Medicine   [facility]

## 2025-02-19 NOTE — PROGRESS NOTES
HEMODIALYSIS NOTE    Miguel Moore is a 69 y.o. male currently on hemodialysis for removal of uremic toxins and volume.   Vitals reviewed      Dialysate bath has been adjusted according to the current labs.  There are no symptoms of hypotension, chest pain, dyspnea, nausea or vomiting.    Labs:      Recent Labs   Lab 02/16/25  0532 02/17/25  0437 02/18/25  0453    134* 137   K 4.5 5.0 5.1   CL 97 94* 95   CO2 24 22* 26   BUN 49* 60* 44*   CREATININE 10.0* 12.2* 8.6*   CALCIUM 8.5* 8.5* 8.5*   PHOS 6.2* 6.5* 6.0*       Recent Labs   Lab 02/16/25  0532 02/17/25  0437 02/18/25  0453   WBC 3.77* 4.68 6.45   HGB 10.6* 10.9* 9.4*   HCT 31.8* 32.9* 27.8*   * 142* 143*       Assessment/Plan:  I personally examined the patient on hemodialysis, reviewed the labs and medication.    Tolerating dialysis well

## 2025-02-19 NOTE — NURSING
[facility] [place] -- 4 [place]      2/19/2025       Skin assessed on: Q Shift      [] No Pressure Injuries Present    []Prevention Measures Documented    [x] Yes LDA  for Pressure Injury Previously documented     [] Yes New Pressure Injury Discovered   [] LDA for New Pressure Injury Added      Attending RN:  Angelica Chavarria LPN     Second RN:  DARRION Hills

## 2025-02-20 PROBLEM — R53.81 DEBILITY: Status: ACTIVE | Noted: 2021-01-05

## 2025-02-20 PROBLEM — K59.01 SLOW TRANSIT CONSTIPATION: Status: ACTIVE | Noted: 2025-02-20

## 2025-02-20 LAB
ANION GAP SERPL CALC-SCNC: 12 MMOL/L (ref 8–16)
BASOPHILS # BLD AUTO: 0.01 K/UL (ref 0–0.2)
BASOPHILS NFR BLD: 0.3 % (ref 0–1.9)
BUN SERPL-MCNC: 30 MG/DL (ref 8–23)
CALCIUM SERPL-MCNC: 8.4 MG/DL (ref 8.7–10.5)
CHLORIDE SERPL-SCNC: 99 MMOL/L (ref 95–110)
CO2 SERPL-SCNC: 25 MMOL/L (ref 23–29)
CREAT SERPL-MCNC: 7.2 MG/DL (ref 0.5–1.4)
DIFFERENTIAL METHOD BLD: ABNORMAL
EOSINOPHIL # BLD AUTO: 0.2 K/UL (ref 0–0.5)
EOSINOPHIL NFR BLD: 4.3 % (ref 0–8)
ERYTHROCYTE [DISTWIDTH] IN BLOOD BY AUTOMATED COUNT: 13.4 % (ref 11.5–14.5)
EST. GFR  (NO RACE VARIABLE): 8 ML/MIN/1.73 M^2
GLUCOSE SERPL-MCNC: 123 MG/DL (ref 70–110)
HCT VFR BLD AUTO: 23.5 % (ref 40–54)
HGB BLD-MCNC: 8 G/DL (ref 14–18)
IMM GRANULOCYTES # BLD AUTO: 0.02 K/UL (ref 0–0.04)
IMM GRANULOCYTES NFR BLD AUTO: 0.5 % (ref 0–0.5)
LYMPHOCYTES # BLD AUTO: 0.7 K/UL (ref 1–4.8)
LYMPHOCYTES NFR BLD: 16.5 % (ref 18–48)
MCH RBC QN AUTO: 27.8 PG (ref 27–31)
MCHC RBC AUTO-ENTMCNC: 34 G/DL (ref 32–36)
MCV RBC AUTO: 82 FL (ref 82–98)
MONOCYTES # BLD AUTO: 0.6 K/UL (ref 0.3–1)
MONOCYTES NFR BLD: 14.2 % (ref 4–15)
NEUTROPHILS # BLD AUTO: 2.5 K/UL (ref 1.8–7.7)
NEUTROPHILS NFR BLD: 64.2 % (ref 38–73)
NRBC BLD-RTO: 0 /100 WBC
PLATELET # BLD AUTO: 126 K/UL (ref 150–450)
PMV BLD AUTO: 9.9 FL (ref 9.2–12.9)
POTASSIUM SERPL-SCNC: 4.7 MMOL/L (ref 3.5–5.1)
RBC # BLD AUTO: 2.88 M/UL (ref 4.6–6.2)
SODIUM SERPL-SCNC: 136 MMOL/L (ref 136–145)
WBC # BLD AUTO: 3.93 K/UL (ref 3.9–12.7)

## 2025-02-20 PROCEDURE — 25000003 PHARM REV CODE 250: Mod: HCNC | Performed by: HOSPITALIST

## 2025-02-20 PROCEDURE — 85025 COMPLETE CBC W/AUTO DIFF WBC: CPT | Mod: HCNC | Performed by: HOSPITALIST

## 2025-02-20 PROCEDURE — 21400001 HC TELEMETRY ROOM: Mod: HCNC

## 2025-02-20 PROCEDURE — 80048 BASIC METABOLIC PNL TOTAL CA: CPT | Mod: HCNC | Performed by: HOSPITALIST

## 2025-02-20 PROCEDURE — 99232 SBSQ HOSP IP/OBS MODERATE 35: CPT | Mod: HCNC,,, | Performed by: PHYSICIAN ASSISTANT

## 2025-02-20 PROCEDURE — 97530 THERAPEUTIC ACTIVITIES: CPT | Mod: HCNC,CQ

## 2025-02-20 PROCEDURE — 25000003 PHARM REV CODE 250: Mod: HCNC | Performed by: ORTHOPAEDIC SURGERY

## 2025-02-20 PROCEDURE — 97530 THERAPEUTIC ACTIVITIES: CPT | Mod: HCNC

## 2025-02-20 PROCEDURE — 25000003 PHARM REV CODE 250: Mod: HCNC | Performed by: INTERNAL MEDICINE

## 2025-02-20 PROCEDURE — 97535 SELF CARE MNGMENT TRAINING: CPT | Mod: HCNC

## 2025-02-20 PROCEDURE — 25000003 PHARM REV CODE 250: Mod: HCNC | Performed by: STUDENT IN AN ORGANIZED HEALTH CARE EDUCATION/TRAINING PROGRAM

## 2025-02-20 PROCEDURE — 36415 COLL VENOUS BLD VENIPUNCTURE: CPT | Mod: HCNC | Performed by: HOSPITALIST

## 2025-02-20 RX ORDER — MIDODRINE HYDROCHLORIDE 5 MG/1
10 TABLET ORAL ONCE
Status: COMPLETED | OUTPATIENT
Start: 2025-02-20 | End: 2025-02-20

## 2025-02-20 RX ADMIN — MIDODRINE HYDROCHLORIDE 10 MG: 5 TABLET ORAL at 12:02

## 2025-02-20 RX ADMIN — TAMSULOSIN HYDROCHLORIDE 0.8 MG: 0.4 CAPSULE ORAL at 09:02

## 2025-02-20 RX ADMIN — ASPIRIN 81 MG CHEWABLE TABLET 81 MG: 81 TABLET CHEWABLE at 09:02

## 2025-02-20 RX ADMIN — SODIUM CHLORIDE 250 ML: 9 INJECTION, SOLUTION INTRAVENOUS at 12:02

## 2025-02-20 RX ADMIN — SEVELAMER CARBONATE 800 MG: 800 TABLET, FILM COATED ORAL at 08:02

## 2025-02-20 RX ADMIN — DOCUSATE SODIUM 100 MG: 100 CAPSULE, LIQUID FILLED ORAL at 09:02

## 2025-02-20 RX ADMIN — SEVELAMER CARBONATE 800 MG: 800 TABLET, FILM COATED ORAL at 05:02

## 2025-02-20 RX ADMIN — APIXABAN 5 MG: 5 TABLET, FILM COATED ORAL at 09:02

## 2025-02-20 RX ADMIN — EZETIMIBE 10 MG: 10 TABLET ORAL at 09:02

## 2025-02-20 RX ADMIN — OXYCODONE 10 MG: 5 TABLET ORAL at 06:02

## 2025-02-20 RX ADMIN — FINASTERIDE 5 MG: 5 TABLET, FILM COATED ORAL at 09:02

## 2025-02-20 RX ADMIN — Medication 324 MG: at 08:02

## 2025-02-20 RX ADMIN — ATORVASTATIN CALCIUM 80 MG: 40 TABLET, FILM COATED ORAL at 09:02

## 2025-02-20 RX ADMIN — PANTOPRAZOLE SODIUM 40 MG: 40 TABLET, DELAYED RELEASE ORAL at 09:02

## 2025-02-20 RX ADMIN — MUPIROCIN: 20 OINTMENT TOPICAL at 09:02

## 2025-02-20 RX ADMIN — SEVELAMER CARBONATE 800 MG: 800 TABLET, FILM COATED ORAL at 12:02

## 2025-02-20 RX ADMIN — POLYETHYLENE GLYCOL 3350 17 G: 17 POWDER, FOR SOLUTION ORAL at 09:02

## 2025-02-20 RX ADMIN — Medication 1 CAPSULE: at 09:02

## 2025-02-20 NOTE — ASSESSMENT & PLAN NOTE
ESRD MWF    HD tomorrow  -Keep MAP > 65  -Keep hemoglobin > 7  -Strict ins and outs  -Renally dose medications  -Avoid drastic hemodynamic changes if possible      #Anemia  Hemoglobin   Date Value Ref Range Status   02/20/2025 8.0 (L) 14.0 - 18.0 g/dL Final     Iron   Date Value Ref Range Status   02/18/2025 25 (L) 45 - 160 ug/dL Final     Transferrin   Date Value Ref Range Status   02/18/2025 120 (L) 200 - 375 mg/dL Final     TIBC   Date Value Ref Range Status   02/18/2025 178 (L) 250 - 450 ug/dL Final     Saturated Iron   Date Value Ref Range Status   02/18/2025 14 (L) 20 - 50 % Final     Ferritin   Date Value Ref Range Status   02/18/2025 3,794 (H) 20.0 - 300.0 ng/mL Final   Repeat iron and ferritin. HB below goal continue to monitor  PERRY with HD tomorrow      #Secondary hyperparathyroidism  Calcium   Date Value Ref Range Status   02/20/2025 8.4 (L) 8.7 - 10.5 mg/dL Final     Phosphorus   Date Value Ref Range Status   02/18/2025 6.0 (H) 2.7 - 4.5 mg/dL Final     PTH, Intact   Date Value Ref Range Status   02/10/2025 589 (H) 16 - 80 pg/mL Final   Phos binders  Continue to monitor

## 2025-02-20 NOTE — PLAN OF CARE
Problem: Adult Inpatient Plan of Care  Goal: Plan of Care Review  Outcome: Progressing  Goal: Patient-Specific Goal (Individualized)  Outcome: Progressing     Problem: Diabetes Comorbidity  Goal: Blood Glucose Level Within Targeted Range  Outcome: Progressing     Problem: Wound  Goal: Optimal Coping  Outcome: Progressing

## 2025-02-20 NOTE — PLAN OF CARE
1:49pm: CM spoke with patient via telephone, [phone], to discuss next steps of care.  Informed patient he has an accepting facility in [facility] and [facility], [phone].  Patient stated he wants to look into facility before making a decision.  Informed patient he is medically stable and will need to make a decision sooner than later; patient verbalized understanding.  CM to continue to assess for and until discharge.     Per attending, patient in agreement with discharging to [facility]    2:04pm: CM spoke with patient via telephone.  He confirmed he is in agreement with [facility] and stated he will be completing his own paperwork    2:08pm: CM spoke to Kelsey with [facility] and informed her that facility can submit for auth and will need someone to complete admission paperwork with him bedside.  Kelsey stated no one would be able to meet patient at bedside until Monday and patient will also need bank statements.  CM to update patient and attending.     2:25pm: CM spoke with patient via telephone and informed him that [facility] will be meeting him at bedside on Monday to complete admission paperwork.  CM also explained to patient that he will need 3 months worth of bank statements to provide to facility.  Patient stated he has a [employer] Card but does not have it with him.  CM encouraged patient to has a family member bring his card and assist him with creating a [employer] account so he can access his bank statements; patient verbalized understanding.  CM to continue to assess for and until discharge.

## 2025-02-20 NOTE — SUBJECTIVE & OBJECTIVE
Interval History: Patient HDS and afebile. No acute events overnight. No new complaints this morning other than some constipation. Pt is medically stable for DC, pending placement.       Review of Systems   Constitutional:  Positive for activity change and fatigue. Negative for fever.   Respiratory:  Negative for cough and shortness of breath.    Cardiovascular:  Negative for chest pain.   Gastrointestinal:  Positive for constipation. Negative for abdominal pain.   Neurological:  Negative for dizziness and headaches.   Psychiatric/Behavioral:  Negative for confusion.    All other systems reviewed and are negative.    Objective:     Vital Signs (Most Recent):  Temp: 98.4 °F (36.9 °C) (02/20/25 1109)  Pulse: 73 (02/20/25 1109)  Resp: 18 (02/20/25 1109)  BP: 116/73 (02/20/25 1109)  SpO2: 98 % (02/20/25 1109) Vital Signs (24h Range):  Temp:  [98 °F (36.7 °C)-98.7 °F (37.1 °C)] 98.4 °F (36.9 °C)  Pulse:  [] 73  Resp:  [18-20] 18  SpO2:  [94 %-100 %] 98 %  BP: ()/(42-76) 116/73     Weight: 72.9 kg (160 lb 11.5 oz)  Body mass index is 24.44 kg/m².    Intake/Output Summary (Last 24 hours) at 2/20/2025 1332  Last data filed at 2/20/2025 1329  Gross per 24 hour   Intake 980 ml   Output 2000 ml   Net -1020 ml         Physical Exam  Vitals and nursing note reviewed.   Constitutional:       Appearance: Normal appearance.   HENT:      Head: Normocephalic and atraumatic.   Cardiovascular:      Rate and Rhythm: Normal rate and regular rhythm.   Pulmonary:      Effort: Pulmonary effort is normal. No respiratory distress.   Abdominal:      General: There is no distension.   Musculoskeletal:         General: Normal range of motion.   Neurological:      Mental Status: He is alert and oriented to person, place, and time.   Psychiatric:         Mood and Affect: Mood normal.         Behavior: Behavior normal.             Significant Labs: All pertinent labs within the past 24 hours have been reviewed.  CBC:   Recent Labs   Lab  02/20/25  0521   WBC 3.93   HGB 8.0*   HCT 23.5*   *     CMP:   Recent Labs   Lab 02/20/25  0521      K 4.7   CL 99   CO2 25   *   BUN 30*   CREATININE 7.2*   CALCIUM 8.4*   ANIONGAP 12       Significant Imaging: I have reviewed all pertinent imaging results/findings within the past 24 hours.

## 2025-02-20 NOTE — PT/OT/SLP PROGRESS
Occupational Therapy   Treatment    Name: Miguel Moore  MRN: [id number]  Admitting Diagnosis:  Closed fracture of right hip  3 Days Post-Op    Recommendations:     Discharge Recommendations: Moderate Intensity Therapy  Discharge Equipment Recommendations:  to be determined by next level of care  Barriers to discharge:  Inaccessible home environment, Decreased caregiver support    Assessment:     Miguel Moore is a 69 y.o. male with a medical diagnosis of Closed fracture of right hip.  He presents with deconditioning, reporting some indigestion when coming to sit as he had just finished lunch. Performance deficits affecting function are weakness, impaired endurance, impaired self care skills, impaired functional mobility, gait instability, impaired balance, decreased lower extremity function, decreased upper extremity function, decreased coordination, decreased safety awareness, pain, abnormal tone, decreased ROM, edema, impaired skin, impaired fine motor, impaired coordination, impaired cardiopulmonary response to activity, impaired joint extensibility, impaired muscle length, orthopedic precautions.     Rehab Prognosis:  Good; patient would benefit from acute skilled OT services to address these deficits and reach maximum level of function.       Plan:     Patient to be seen  (3-5x/week) to address the above listed problems via self-care/home management, therapeutic activities, therapeutic exercises  Plan of Care Expires: 03/18/25  Plan of Care Reviewed with: patient    Subjective     Chief Complaint: Pt reports no pain RLE but exhibits limited WB through RLE  Patient/Family Comments/goals: To return to PLOF  Pain/Comfort:  Pain Rating 1: 0/10  Location - Side 1: Right  Location - Orientation 1: generalized  Location 1: hip  Pain Addressed 1: Reposition, Distraction, Cessation of Activity  Pain Rating Post-Intervention 1: 0/10    Objective:     Communicated with: nsg prior to session.  Patient found HOB elevated  with bed alarm, telemetry, pressure relief boots, peripheral IV upon OT entry to room.    General Precautions: Standard, diabetic, fall, seizure    Orthopedic Precautions:RLE weight bearing as tolerated, RLE posterior precautions  Braces: N/A  Respiratory Status: Room air     Occupational Performance:     Bed Mobility:    Patient completed Rolling/Turning to Left with  stand by assistance with increased time  Patient completed Scooting/Bridging with stand by assistance when able to reach LUE to bed rail to pull self up; provided with max A x2 persons to bring higher in bed to perform  Patient completed Supine to Sit with contact guard assistance and minimum assistance  Patient completed Sit to Supine with contact guard assistance and minimum assistance     Functional Mobility/Transfers:  Patient completed Sit <> Stand Transfer with moderate assistance and of 1-2 persons  with  quad cane   Functional Mobility: Pt with fair dynamic seated and poor to poor+ static and dynamic standing balance. Pt performed with increased stability with OT assisting pt to WB through RUE and may perform better with R platform RW, which pt reports he was trialing with PT prior to recent hospitalization    Activities of Daily Living:  Upper Body Dressing: maximal assistance to don/doff gown as robe seated EOB  Lower Body Dressing: maximal assistance to don R Darco shoe seated EOB; Pt reported that prior to arrival, he was wearing mostly slip on shoes such as crocs and pt educated that MD wants him to wear Darco shoe at this time for wound healing  Toileting: maximal assistance and total assistance for pericare in stance      [facility] [address] 6 Click ADL: 12    Treatment & Education:  Pt educated on role of OT and POC.   Pt performing skills as listed above.     Patient left HOB elevated with all lines intact, call button in reach, and nsg notified    GOALS:   Multidisciplinary Problems       Occupational Therapy Goals          Problem: Occupational  Therapy    Goal Priority Disciplines Outcome Interventions   Occupational Therapy Goal     OT, PT/OT Progressing    Description: Goals to be met by: 03/18/2025      Patient will increase functional independence with ADLs by performing:    UE Dressing with Minimal Assistance.  LE Dressing with Maximum Assistance.  Grooming while standing or seated with Minimal Assistance.  Toileting from bedside commode with Moderate Assistance for hygiene and clothing management.   Toilet transfer to bedside commode with Moderate Assistance.  Increased functional strength to WFL for self care LUE.  Upper extremity exercise program x10 reps per handout, with assistance as needed.                            Time Tracking:     OT Date of Treatment: 02/20/25  OT Start Time: 1243  OT Stop Time: 1310  OT Total Time (min): 27 min    Billable Minutes:Self Care/Home Management 13  Therapeutic Activity 14    OT/GENEVA: OT     Number of GENEVA visits since last OT visit: 0    2/20/2025

## 2025-02-20 NOTE — NURSING
[facility] [place]  Nurses Note -- 4 Eyes      2/19/2025       Skin assessed on: Q Shift      [] No Pressure Injuries Present    []Prevention Measures Documented    [x] Yes LDA  for Pressure Injury Previously documented   Left heel and right plantar    [] Yes New Pressure Injury Discovered   [] LDA for New Pressure Injury Added      Attending RN:  Tayla Tong RN     Second RN:  JIM Dominguez

## 2025-02-20 NOTE — NURSING
"Pt refused to take his polyethylene glycol packet, despite explaining to him the purpose. He stated "I don't want loose stools".   "

## 2025-02-20 NOTE — PT/OT/SLP PROGRESS
"Physical Therapy Treatment    Patient Name:  Miguel Moore   MRN:  [id number]    Recommendations:     Discharge Recommendations: Moderate Intensity Therapy  Discharge Equipment Recommendations: none  Barriers to discharge: None    Assessment:     Miguel Moore is a 69 y.o. male admitted with a medical diagnosis of Closed fracture of right hip.  He presents with the following impairments/functional limitations: weakness, impaired endurance, impaired self care skills, impaired functional mobility, gait instability, impaired balance, decreased coordination, decreased upper extremity function, decreased lower extremity function, pain, decreased safety awareness, decreased ROM, orthopedic precautions, impaired skin, edema, impaired coordination.    Rehab Prognosis: Fair; patient would benefit from acute skilled PT services to address these deficits and reach maximum level of function.    Recent Surgery: Procedure(s) (LRB):  ARTHROPLASTY, HIP, TOTAL, USING COMPUTER-ASSISTED NAVIGATION (Right) 3 Days Post-Op    Plan:     During this hospitalization, patient to be seen 6 x/week to address the identified rehab impairments via gait training, therapeutic activities, therapeutic exercises, wheelchair management/training and progress toward the following goals:    Plan of Care Expires:  03/04/25    Subjective     Chief Complaint: "I just finished eating my lunch, I need it to digest"  Patient/Family Comments/goals: Pt agreed to therapy  Pain/Comfort:  Pain Rating 1: 0/10      Objective:     Communicated with nursing prior to session.  Patient found HOB elevated with bed alarm, pressure relief boots, peripheral IV upon PT entry to room.     General Precautions: Standard, fall, seizure, diabetic  Orthopedic Precautions: RLE weight bearing as tolerated, RLE posterior precautions  Braces: N/A  Respiratory Status: Room air     Functional Mobility:  Bed Mobility:     Scooting: stand by assistance  Supine to Sit: stand by " assistance  Sit to Supine: contact guard assistance and minimum assistance for LE assistance  Transfers: Gait belt donned, x2 trials from EOB    Sit to Stand:  moderate assistance and of 1-2 persons with rolling walker  Gait: Pt able to pivot B feet this date, unable to clear the floor. Pt with heavy L lateral lean initially, pt more stable once RUE was supported. Pt required Max A, v/c to place R foot on the floor, pt with decreased weight shifting and step length.  Balance: Poor standing balance, Fair- sitting balance, pt with posterior lean      AM-PAC 6 CLICK MOBILITY  Turning over in bed (including adjusting bedclothes, sheets and blankets)?: 3  Sitting down on and standing up from a chair with arms (e.g., wheelchair, bedside commode, etc.): 2  Moving from lying on back to sitting on the side of the bed?: 3  Moving to and from a bed to a chair (including a wheelchair)?: 2  Need to walk in hospital room?: 1  Climbing 3-5 steps with a railing?: 1  Basic Mobility Total Score: 12       Treatment & Education:  Pt instructed to perform LAQ, AAROM to RLE 1x10    Patient left HOB elevated with all lines intact, call button in reach, bed alarm on, and nursing notified..    GOALS:   Multidisciplinary Problems       Physical Therapy Goals          Problem: Physical Therapy    Goal Priority Disciplines Outcome Interventions   Physical Therapy Goal     PT, PT/OT Progressing    Description: Goals to be met by: 3/4/25     Patient will increase functional independence with mobility by performin. Supine to sit with supervision  2. Rolling to Left and Right with supervision  3. Sit to stand transfer with Minimal Assistance using Quad Cane  4. Bed to chair transfer with Minimal Assistance   5. Gait x10 feet with Minimal Assistance using Quad Cane   6. Wheelchair propulsion x50 feet with Minimal Assistance using left upper/lower extremity  7. Lower extremity exercise program 2 sets x10 reps per handout, with supervision                          DME Justifications:      Time Tracking:     PT Received On: 02/20/25  PT Start Time: 1243     PT Stop Time: 1310  PT Total Time (min): 27 min     Billable Minutes: Therapeutic Activity 27    Treatment Type: Treatment  PT/PTA: PTA     Number of PTA visits since last PT visit: 2     02/20/2025

## 2025-02-20 NOTE — PROGRESS NOTES
[facility]  Telemedicine Progress Note    Patient Name: Miguel Moore  MRN: [id number]  Patient Class: IP- Inpatient   Admission Date: 2/15/2025  Length of Stay: 5 days  Attending Physician: Rosalva Manning MD  Primary Care Provider: Raciel Raymond MD          Subjective:     Principal Problem:Closed fracture of right hip        HPI:  Is a 69-year-old male with history of CVA, hypertension, ESRD on HD, DVT on Eliquis who presents to the emergency department for evaluation after a fall.  Patient reports he gets around with a walker and he slipped and fell to his right hip.  Denies loss of consciousness, hitting of his head or other recent issues.  In the ED, Xray of right hip positive for acute right femoral neck fracture. CT confirmed. Orthopedic Surgery consulted. Plan for OR on 2/17/25. He will be admitted to hospital medicine for further management.     Overview/Hospital Course:  Mr. Moore is a 69-year-old male who was admitted with a right femoral neck fracture.  Orthopedic surgery consulted.S/P right  hip replacement arthroplasty on 02/17/2025.  Nephrology consulted for dialysis.  Wound care is doing wound care on right plantar foot and left heel.  PT,OT ,plan for SNF.    Interval History: Patient HDS and afebile. No acute events overnight. No new complaints this morning other than some constipation. Pt is medically stable for DC, pending placement.       Review of Systems   Constitutional:  Positive for activity change and fatigue. Negative for fever.   Respiratory:  Negative for cough and shortness of breath.    Cardiovascular:  Negative for chest pain.   Gastrointestinal:  Positive for constipation. Negative for abdominal pain.   Neurological:  Negative for dizziness and headaches.   Psychiatric/Behavioral:  Negative for confusion.    All other systems reviewed and are negative.    Objective:     Vital Signs (Most Recent):  Temp: 98.4 °F (36.9 °C) (02/20/25  1109)  Pulse: 73 (02/20/25 1109)  Resp: 18 (02/20/25 1109)  BP: 116/73 (02/20/25 1109)  SpO2: 98 % (02/20/25 1109) Vital Signs (24h Range):  Temp:  [98 °F (36.7 °C)-98.7 °F (37.1 °C)] 98.4 °F (36.9 °C)  Pulse:  [] 73  Resp:  [18-20] 18  SpO2:  [94 %-100 %] 98 %  BP: ()/(42-76) 116/73     Weight: 72.9 kg (160 lb 11.5 oz)  Body mass index is 24.44 kg/m².    Intake/Output Summary (Last 24 hours) at 2/20/2025 1332  Last data filed at 2/20/2025 1329  Gross per 24 hour   Intake 980 ml   Output 2000 ml   Net -1020 ml         Physical Exam  Vitals and nursing note reviewed.   Constitutional:       Appearance: Normal appearance.   HENT:      Head: Normocephalic and atraumatic.   Cardiovascular:      Rate and Rhythm: Normal rate and regular rhythm.   Pulmonary:      Effort: Pulmonary effort is normal. No respiratory distress.   Abdominal:      General: There is no distension.   Musculoskeletal:         General: Normal range of motion.   Neurological:      Mental Status: He is alert and oriented to person, place, and time.   Psychiatric:         Mood and Affect: Mood normal.         Behavior: Behavior normal.             Significant Labs: All pertinent labs within the past 24 hours have been reviewed.  CBC:   Recent Labs   Lab 02/20/25  0521   WBC 3.93   HGB 8.0*   HCT 23.5*   *     CMP:   Recent Labs   Lab 02/20/25  0521      K 4.7   CL 99   CO2 25   *   BUN 30*   CREATININE 7.2*   CALCIUM 8.4*   ANIONGAP 12       Significant Imaging: I have reviewed all pertinent imaging results/findings within the past 24 hours.    Assessment/Plan:      * Closed fracture of right hip  Patient presents with a fall, subsequent right femoral neck fracture.  Orthopedic surgery consulted, plan for hip replacement on 02/17/2025.  Continue with supportive care and pain control.    - s/p hip replacement on 2/17/25  - PT/OT  - pain control    Slow transit constipation  -Bowel reg ordered      Wounds, multiple  Wound  care at home for bilateral lower extremities.  Mainly right lower extremity, wound care comes Tuesdays and Thursdays.  We will consult Wound Care for further management.    Wound care is doing wound care on right plantar foot and left heel.    Paroxysmal atrial fibrillation  Patient has paroxysmal (<7 days) atrial fibrillation. Patient is currently in sinus rhythm. [id number] Score: 3. The patients heart rate in the last 24 hours is as follows:  Pulse  Min: 85  Max: 96     Antiarrhythmics       Anticoagulants  heparin 25,000 units in dextrose 5% (100 units/ml) IV bolus from bag LOW INTENSITY nomogram - [facility], Once, Intravenous  heparin 25,000 units in dextrose 5% 250 mL (100 units/mL) infusion LOW INTENSITY nomogram - [facility], Continuous, Intravenous  heparin 25,000 units in dextrose 5% (100 units/ml) IV bolus from bag LOW INTENSITY nomogram - [facility], As needed (PRN), Intravenous  heparin 25,000 units in dextrose 5% (100 units/ml) IV bolus from bag LOW INTENSITY nomogram - [facility], As needed (PRN), Intravenous    Plan  - Replete lytes with a goal of K>4, Mg >2  - Patient is anticoagulated, see medications listed above.  - Patient's afib is currently controlled          Debility  -Patient noted to be medically stable for discharge at this time  -Has been able to work with OT/PT who recommend placement for further rehabilitation  -Patient pending placement, continue in-hospital care as stated above in the meantime  -More than 20 minutes of my time has been spent discussing and planning just her safe disposition with patient/family/CM/SW/Nursing staff (not including other time spent in clinical discussion and management)  -CM/SW following, appreciate input   -Will place DC orders once placement has been secured        Chronic deep vein thrombosis (DVT) of popliteal vein of right lower extremity 9/21/20 outside [place]; unprovoked; anticoagulation  Patient on Eliquis at home.  Transitioned to heparin prior to surgery.  - resume eliquis  "      DM type 2 without retinopathy  Patient's FSGs are controlled on current medication regimen.  Last A1c reviewed-   Lab Results   Component Value Date    HGBA1C 5.8 12/09/2024     Most recent fingerstick glucose reviewed- No results for input(s): "POCTGLUCOSE" in the last 24 hours.  Current correctional scale   none    ESRD (end stage renal disease)  Creatine stable for now. BMP reviewed- noted Estimated Creatinine Clearance: 7.7 mL/min (A) (based on SCr of 8.6 mg/dL (H)). according to latest data. Based on current GFR, CKD stage is end stage.  Monitor UOP and serial BMP and adjust therapy as needed. Renally dose meds. Avoid nephrotoxic medications and procedures.    Dialysis Monday Wednesday Friday.  Has left arm fistula.  Nephrology consulted.    Hemiplegia and hemiparesis following cerebral infarction affecting right dominant side  Noted      BPH (benign prostatic hyperplasia) 2/18/21 prostate bx normal  Resume home medications        VTE Risk Mitigation (From admission, onward)           Ordered     Place JJ hose  Until discontinued         02/17/25 2224     apixaban tablet 5 mg  2 times daily         02/17/25 1610     IP VTE HIGH RISK PATIENT  Once         02/15/25 1525     Place sequential compression device  Until discontinued         02/15/25 1525                          I have completed this tele-visit without the assistance of a telepresenter.    The attending portion of this evaluation, treatment, and documentation was performed per Rosalva Manning MD via Telemedicine AudioVisual using the secure [employer] software platform with 2 way audio/video. The provider was located off-site and the patient is located in the hospital. The aforementioned video software was utilized to document the relevant history and physical exam    Rosalva Manning MD  Department of Hospital Medicine   [facility] [place] - Med Surg    "

## 2025-02-20 NOTE — SUBJECTIVE & OBJECTIVE
Interval History: UF 1.5L. NAEON. Normal appetite, no complaints    Review of patient's allergies indicates:   Allergen Reactions    Ace inhibitors      Hyperkalemia 8/2018  Other reaction(s): Unknown    Penicillins Hives     Tolerated cefepime and cefdinir previously    Tizanidine      Felt hot     Current Facility-Administered Medications   Medication Frequency    acetaminophen tablet 650 mg Q4H PRN    apixaban tablet 5 mg BID    aspirin chewable tablet 81 mg Daily    atorvastatin tablet 80 mg Daily    dextrose 50% injection 12.5 g PRN    dextrose 50% injection 25 g PRN    docusate sodium capsule 100 mg BID    ezetimibe tablet 10 mg Daily    ferrous gluconate tablet 324 mg Daily with breakfast    finasteride tablet 5 mg Daily    glucagon (human recombinant) injection 1 mg PRN    glucose chewable tablet 16 g PRN    glucose chewable tablet 24 g PRN    HYDROcodone-acetaminophen 5-325 mg per tablet 1 tablet Q6H PRN    mupirocin 2 % ointment BID    naloxone 0.4 mg/mL injection 0.02 mg PRN    ondansetron injection 4 mg Q6H PRN    oxyCODONE immediate release tablet 10 mg Q6H PRN    pantoprazole EC tablet 40 mg BID    polyethylene glycol packet 17 g BID    sevelamer carbonate tablet 800 mg TID WM    sodium chloride 0.9% flush 10 mL Q12H PRN    sodium chloride 0.9% flush 2 mL PRN    tamsulosin 24 hr capsule 0.8 mg Daily    vitamin renal formula (B-complex-vitamin c-folic acid) 1 mg per capsule 1 capsule Daily       Objective:     Vital Signs (Most Recent):  Temp: 98.4 °F (36.9 °C) (02/20/25 1109)  Pulse: 73 (02/20/25 1109)  Resp: 18 (02/20/25 1109)  BP: 116/73 (02/20/25 1109)  SpO2: 98 % (02/20/25 1109) Vital Signs (24h Range):  Temp:  [98 °F (36.7 °C)-98.7 °F (37.1 °C)] 98.4 °F (36.9 °C)  Pulse:  [] 73  Resp:  [18-20] 18  SpO2:  [94 %-100 %] 98 %  BP: ()/(42-76) 116/73     Weight: 72.9 kg (160 lb 11.5 oz) (02/15/25 2015)  Body mass index is 24.44 kg/m².  Body surface area is 1.87 meters squared.    I/O last 3  completed shifts:  In: 980 [P.O.:480; Other:500]  Out: 2000 [Other:2000]     Physical Exam  Vitals and nursing note reviewed.   Constitutional:       Appearance: Normal appearance.   HENT:      Head: Normocephalic.   Cardiovascular:      Rate and Rhythm: Normal rate.   Pulmonary:      Effort: Pulmonary effort is normal.      Breath sounds: Normal breath sounds.   Abdominal:      Palpations: Abdomen is soft.   Musculoskeletal:      Cervical back: Normal range of motion.      Right lower leg: No edema.      Left lower leg: No edema.   Skin:     General: Skin is warm.   Neurological:      General: No focal deficit present.      Mental Status: He is alert.   Psychiatric:         Mood and Affect: Mood normal.          Significant Labs:  CBC:   Recent Labs   Lab 02/20/25  0521   WBC 3.93   RBC 2.88*   HGB 8.0*   HCT 23.5*   *   MCV 82   MCH 27.8   MCHC 34.0     CMP:   Recent Labs   Lab 02/15/25  1334 02/16/25  0532 02/20/25  0521      < > 123*   CALCIUM 8.6*   < > 8.4*   ALBUMIN 3.4*  --   --    PROT 7.2  --   --       < > 136   K 4.8   < > 4.7   CO2 24   < > 25   CL 97   < > 99   BUN 41*   < > 30*   CREATININE 8.6*   < > 7.2*   ALKPHOS 76  --   --    ALT <5*  --   --    AST 12  --   --    BILITOT 0.6  --   --     < > = values in this interval not displayed.     All labs within the past 24 hours have been reviewed.     Significant Imaging:  none

## 2025-02-20 NOTE — PROGRESS NOTES
[facility]  Nephrology  Progress Note    Patient Name: Miguel Moore  MRN: [id number]  Admission Date: 2/15/2025  Hospital Length of Stay: 5 days  Attending Provider: Rosalva Manning MD   Primary Care Physician: Raciel Raymond MD  Principal Problem:Closed fracture of right hip    Subjective:     HPI: This is a 69 y.o. M with a PMHx of MI, seizures, T2DM, HTN, DVT , ESRD on HD presented to ED 2/15/24 via EMS with traumatic right hip pain s/p fall. Right hip xray + femoral neck fracture. Pt admitted for surgical management. Nephrology consulted for hemodialysis. Plan for right Primary Total Hip Arthroplasty 2/16/25    Prior records obtained and reviewed.  last HD session 2/14/25  Attending Nephrologist: CLEMENTE SHAHID MD  Dialysis Location: [facility]  Schedule: M-W-F   Duration 3.5hrs  EDW 70.2kg  LUE AVF    Interval History: UF 1.5L. NAEON. Normal appetite, no complaints    Review of patient's allergies indicates:   Allergen Reactions    Ace inhibitors      Hyperkalemia 8/2018  Other reaction(s): Unknown    Penicillins Hives     Tolerated cefepime and cefdinir previously    Tizanidine      Felt hot     Current Facility-Administered Medications   Medication Frequency    acetaminophen tablet 650 mg Q4H PRN    apixaban tablet 5 mg BID    aspirin chewable tablet 81 mg Daily    atorvastatin tablet 80 mg Daily    dextrose 50% injection 12.5 g PRN    dextrose 50% injection 25 g PRN    docusate sodium capsule 100 mg BID    ezetimibe tablet 10 mg Daily    ferrous gluconate tablet 324 mg Daily with breakfast    finasteride tablet 5 mg Daily    glucagon (human recombinant) injection 1 mg PRN    glucose chewable tablet 16 g PRN    glucose chewable tablet 24 g PRN    HYDROcodone-acetaminophen 5-325 mg per tablet 1 tablet Q6H PRN    mupirocin 2 % ointment BID    naloxone 0.4 mg/mL injection 0.02 mg PRN    ondansetron injection 4 mg Q6H PRN    oxyCODONE immediate release tablet 10 mg Q6H PRN     pantoprazole EC tablet 40 mg BID    polyethylene glycol packet 17 g BID    sevelamer carbonate tablet 800 mg TID WM    sodium chloride 0.9% flush 10 mL Q12H PRN    sodium chloride 0.9% flush 2 mL PRN    tamsulosin 24 hr capsule 0.8 mg Daily    vitamin renal formula (B-complex-vitamin c-folic acid) 1 mg per capsule 1 capsule Daily       Objective:     Vital Signs (Most Recent):  Temp: 98.4 °F (36.9 °C) (02/20/25 1109)  Pulse: 73 (02/20/25 1109)  Resp: 18 (02/20/25 1109)  BP: 116/73 (02/20/25 1109)  SpO2: 98 % (02/20/25 1109) Vital Signs (24h Range):  Temp:  [98 °F (36.7 °C)-98.7 °F (37.1 °C)] 98.4 °F (36.9 °C)  Pulse:  [] 73  Resp:  [18-20] 18  SpO2:  [94 %-100 %] 98 %  BP: ()/(42-76) 116/73     Weight: 72.9 kg (160 lb 11.5 oz) (02/15/25 2015)  Body mass index is 24.44 kg/m².  Body surface area is 1.87 meters squared.    I/O last 3 completed shifts:  In: 980 [P.O.:480; Other:500]  Out: 2000 [Other:2000]     Physical Exam  Vitals and nursing note reviewed.   Constitutional:       Appearance: Normal appearance.   HENT:      Head: Normocephalic.   Cardiovascular:      Rate and Rhythm: Normal rate.   Pulmonary:      Effort: Pulmonary effort is normal.      Breath sounds: Normal breath sounds.   Abdominal:      Palpations: Abdomen is soft.   Musculoskeletal:      Cervical back: Normal range of motion.      Right lower leg: No edema.      Left lower leg: No edema.   Skin:     General: Skin is warm.   Neurological:      General: No focal deficit present.      Mental Status: He is alert.   Psychiatric:         Mood and Affect: Mood normal.          Significant Labs:  CBC:   Recent Labs   Lab 02/20/25  0521   WBC 3.93   RBC 2.88*   HGB 8.0*   HCT 23.5*   *   MCV 82   MCH 27.8   MCHC 34.0     CMP:   Recent Labs   Lab 02/15/25  1334 02/16/25  0532 02/20/25  0521      < > 123*   CALCIUM 8.6*   < > 8.4*   ALBUMIN 3.4*  --   --    PROT 7.2  --   --       < > 136   K 4.8   < > 4.7   CO2 24   < > 25    CL 97   < > 99   BUN 41*   < > 30*   CREATININE 8.6*   < > 7.2*   ALKPHOS 76  --   --    ALT <5*  --   --    AST 12  --   --    BILITOT 0.6  --   --     < > = values in this interval not displayed.     All labs within the past 24 hours have been reviewed.     Significant Imaging:  none  Assessment/Plan:     Renal/  ESRD (end stage renal disease)  ESRD MWF    HD tomorrow  -Keep MAP > 65  -Keep hemoglobin > 7  -Strict ins and outs  -Renally dose medications  -Avoid drastic hemodynamic changes if possible      #Anemia  Hemoglobin   Date Value Ref Range Status   02/20/2025 8.0 (L) 14.0 - 18.0 g/dL Final     Iron   Date Value Ref Range Status   02/18/2025 25 (L) 45 - 160 ug/dL Final     Transferrin   Date Value Ref Range Status   02/18/2025 120 (L) 200 - 375 mg/dL Final     TIBC   Date Value Ref Range Status   02/18/2025 178 (L) 250 - 450 ug/dL Final     Saturated Iron   Date Value Ref Range Status   02/18/2025 14 (L) 20 - 50 % Final     Ferritin   Date Value Ref Range Status   02/18/2025 3,794 (H) 20.0 - 300.0 ng/mL Final   Repeat iron and ferritin. HB below goal continue to monitor  PERRY with HD tomorrow      #Secondary hyperparathyroidism  Calcium   Date Value Ref Range Status   02/20/2025 8.4 (L) 8.7 - 10.5 mg/dL Final     Phosphorus   Date Value Ref Range Status   02/18/2025 6.0 (H) 2.7 - 4.5 mg/dL Final     PTH, Intact   Date Value Ref Range Status   02/10/2025 589 (H) 16 - 80 pg/mL Final   Phos binders  Continue to monitor          Thank you for your consult. I will follow-up with patient. Please contact us if you have any additional questions.    HANNAH Huynh  Nephrology  [facility] [place] [facility]

## 2025-02-21 LAB
ANION GAP SERPL CALC-SCNC: 12 MMOL/L (ref 8–16)
BUN SERPL-MCNC: 44 MG/DL (ref 8–23)
CALCIUM SERPL-MCNC: 8.5 MG/DL (ref 8.7–10.5)
CHLORIDE SERPL-SCNC: 98 MMOL/L (ref 95–110)
CO2 SERPL-SCNC: 25 MMOL/L (ref 23–29)
CREAT SERPL-MCNC: 9.5 MG/DL (ref 0.5–1.4)
EST. GFR  (NO RACE VARIABLE): 5 ML/MIN/1.73 M^2
GLUCOSE SERPL-MCNC: 124 MG/DL (ref 70–110)
POTASSIUM SERPL-SCNC: 5 MMOL/L (ref 3.5–5.1)
SODIUM SERPL-SCNC: 135 MMOL/L (ref 136–145)

## 2025-02-21 PROCEDURE — 25000003 PHARM REV CODE 250: Mod: HCNC | Performed by: ORTHOPAEDIC SURGERY

## 2025-02-21 PROCEDURE — 21400001 HC TELEMETRY ROOM: Mod: HCNC

## 2025-02-21 PROCEDURE — 63600175 PHARM REV CODE 636 W HCPCS: Mod: JZ,TB,HCNC | Performed by: STUDENT IN AN ORGANIZED HEALTH CARE EDUCATION/TRAINING PROGRAM

## 2025-02-21 PROCEDURE — 80048 BASIC METABOLIC PNL TOTAL CA: CPT | Mod: HCNC | Performed by: HOSPITALIST

## 2025-02-21 PROCEDURE — 36415 COLL VENOUS BLD VENIPUNCTURE: CPT | Mod: HCNC | Performed by: HOSPITALIST

## 2025-02-21 PROCEDURE — 25000003 PHARM REV CODE 250: Mod: HCNC | Performed by: INTERNAL MEDICINE

## 2025-02-21 PROCEDURE — 80100016 HC MAINTENANCE HEMODIALYSIS: Mod: HCNC

## 2025-02-21 PROCEDURE — 25000003 PHARM REV CODE 250: Mod: HCNC | Performed by: STUDENT IN AN ORGANIZED HEALTH CARE EDUCATION/TRAINING PROGRAM

## 2025-02-21 PROCEDURE — 90935 HEMODIALYSIS ONE EVALUATION: CPT | Mod: HCNC,,, | Performed by: STUDENT IN AN ORGANIZED HEALTH CARE EDUCATION/TRAINING PROGRAM

## 2025-02-21 RX ADMIN — SEVELAMER CARBONATE 800 MG: 800 TABLET, FILM COATED ORAL at 08:02

## 2025-02-21 RX ADMIN — TAMSULOSIN HYDROCHLORIDE 0.8 MG: 0.4 CAPSULE ORAL at 08:02

## 2025-02-21 RX ADMIN — MUPIROCIN: 20 OINTMENT TOPICAL at 08:02

## 2025-02-21 RX ADMIN — EPOETIN ALFA-EPBX 3640 UNITS: 4000 INJECTION, SOLUTION INTRAVENOUS; SUBCUTANEOUS at 09:02

## 2025-02-21 RX ADMIN — SEVELAMER CARBONATE 800 MG: 800 TABLET, FILM COATED ORAL at 06:02

## 2025-02-21 RX ADMIN — FINASTERIDE 5 MG: 5 TABLET, FILM COATED ORAL at 08:02

## 2025-02-21 RX ADMIN — Medication 1 CAPSULE: at 08:02

## 2025-02-21 RX ADMIN — PANTOPRAZOLE SODIUM 40 MG: 40 TABLET, DELAYED RELEASE ORAL at 08:02

## 2025-02-21 RX ADMIN — APIXABAN 5 MG: 5 TABLET, FILM COATED ORAL at 08:02

## 2025-02-21 RX ADMIN — Medication 324 MG: at 08:02

## 2025-02-21 RX ADMIN — EZETIMIBE 10 MG: 10 TABLET ORAL at 08:02

## 2025-02-21 RX ADMIN — ATORVASTATIN CALCIUM 80 MG: 40 TABLET, FILM COATED ORAL at 08:02

## 2025-02-21 RX ADMIN — ASPIRIN 81 MG CHEWABLE TABLET 81 MG: 81 TABLET CHEWABLE at 08:02

## 2025-02-21 NOTE — PROGRESS NOTES
HEMODIALYSIS NOTE    Miguel Moore is a 69 y.o. male currently on hemodialysis for removal of uremic toxins and volume.   Vitals reviewed      Dialysate bath has been adjusted according to the current labs.  There are no symptoms of hypotension, chest pain, dyspnea, nausea or vomiting.    Labs:      Recent Labs   Lab 02/16/25  0532 02/17/25  0437 02/18/25  0453 02/20/25  0521 02/21/25  0535    134* 137 136 135*   K 4.5 5.0 5.1 4.7 5.0   CL 97 94* 95 99 98   CO2 24 22* 26 25 25   BUN 49* 60* 44* 30* 44*   CREATININE 10.0* 12.2* 8.6* 7.2* 9.5*   CALCIUM 8.5* 8.5* 8.5* 8.4* 8.5*   PHOS 6.2* 6.5* 6.0*  --   --        Recent Labs   Lab 02/17/25  0437 02/18/25  0453 02/20/25  0521   WBC 4.68 6.45 3.93   HGB 10.9* 9.4* 8.0*   HCT 32.9* 27.8* 23.5*   * 143* 126*       Assessment/Plan:  I personally examined the patient on hemodialysis, reviewed the labs and medication.  ESRD - tolerating HD well  Anemia - stable  Secondary hyperparathyroidism - stable

## 2025-02-21 NOTE — PT/OT/SLP PROGRESS
Occupational Therapy      Patient Name:  Miguel Moore   MRN:  [id number]    Patient not seen today secondary to Other (Comment), Patient fatigue (patient said he was too tired and blood pressure was low during HD). Will follow-up tomorrow.    2/21/2025

## 2025-02-21 NOTE — PT/OT/SLP PROGRESS
Occupational Therapy      Patient Name:  Miguel Moore   MRN:  [id number]    Patient not seen today secondary to Dialysis. Will follow-up tomorrow if able.    2/21/2025

## 2025-02-21 NOTE — NURSING
[facility], [facility]  Nurses Note -- 4 Eyes      2/20/2025       Skin assessed on: Q Shift      [] No Pressure Injuries Present    []Prevention Measures Documented    [x] Yes LDA  for Pressure Injury Previously documented     [] Yes New Pressure Injury Discovered   [] LDA for New Pressure Injury Added      Attending RN:  Teofilo Angulo RN     Second RN:  Olivia Ordonez RN

## 2025-02-21 NOTE — PLAN OF CARE
Problem: Adult Inpatient Plan of Care  Goal: Plan of Care Review  2/20/2025 1842 by Teofilo Angulo RN  Outcome: Progressing  2/20/2025 1658 by Teofilo Angulo RN  Outcome: Progressing  Goal: Patient-Specific Goal (Individualized)  Outcome: Progressing  Goal: Absence of Hospital-Acquired Illness or Injury  Outcome: Progressing  Goal: Optimal Comfort and Wellbeing  Outcome: Progressing  Goal: Readiness for Transition of Care  Outcome: Progressing     Problem: Diabetes Comorbidity  Goal: Blood Glucose Level Within Targeted Range  Outcome: Progressing     Problem: Wound  Goal: Optimal Coping  Outcome: Progressing  Goal: Optimal Functional Ability  Outcome: Progressing  Goal: Absence of Infection Signs and Symptoms  Outcome: Progressing  Goal: Improved Oral Intake  Outcome: Progressing  Goal: Optimal Pain Control and Function  Outcome: Progressing  Goal: Skin Health and Integrity  Outcome: Progressing  Goal: Optimal Wound Healing  Outcome: Progressing     Problem: Fall Injury Risk  Goal: Absence of Fall and Fall-Related Injury  Outcome: Progressing     Problem: Skin Injury Risk Increased  Goal: Skin Health and Integrity  Outcome: Progressing     Problem: Hemodialysis  Goal: Safe, Effective Therapy Delivery  Outcome: Progressing  Goal: Effective Tissue Perfusion  Outcome: Progressing  Goal: Absence of Infection Signs and Symptoms  Outcome: Progressing     Problem: Hip Arthroplasty  Goal: Optimal Coping  Outcome: Progressing  Goal: Absence of Bleeding  Outcome: Progressing  Goal: Effective Bowel Elimination  Outcome: Progressing  Goal: Fluid and Electrolyte Balance  Outcome: Progressing  Goal: Optimal Functional Ability  Outcome: Progressing  Goal: Absence of Infection Signs and Symptoms  Outcome: Progressing  Goal: Intact Neurovascular Status  Outcome: Progressing  Goal: Anesthesia/Sedation Recovery  Outcome: Progressing  Goal: Acceptable Pain Control  Outcome: Progressing  Goal: Nausea and Vomiting  Relief  Outcome: Progressing  Goal: Effective Urinary Elimination  Outcome: Progressing  Goal: Effective Oxygenation and Ventilation  Outcome: Progressing

## 2025-02-21 NOTE — NURSING
Pt sitting up in bed, pt denied any discomfort at the moment. Dressing to left heel and right foot is dry, clean and intact. Bed alarm set, call light in reach, instructed pt to call for assistance.     [facility] [place]es [place] -- 4 Eyes      2/20/2025       Skin assessed on: Q Shift      [] No Pressure Injuries Present    []Prevention Measures Documented    [x] Yes LDA  for Pressure Injury Previously documented     [] Yes New Pressure Injury Discovered   [] LDA for New Pressure Injury Added      Attending RN:  Tayla Tong RN     Second RN:  Jose

## 2025-02-21 NOTE — PLAN OF CARE
Problem: Adult Inpatient Plan of Care  Goal: Plan of Care Review  Outcome: Progressing  Goal: Optimal Comfort and Wellbeing  Outcome: Progressing     Problem: Diabetes Comorbidity  Goal: Blood Glucose Level Within Targeted Range  Outcome: Progressing     Problem: Wound  Goal: Optimal Coping  Outcome: Progressing  Goal: Absence of Infection Signs and Symptoms  Outcome: Progressing  Goal: Optimal Pain Control and Function  Outcome: Progressing  Goal: Optimal Wound Healing  Outcome: Progressing     Problem: Fall Injury Risk  Goal: Absence of Fall and Fall-Related Injury  Outcome: Progressing     Problem: Skin Injury Risk Increased  Goal: Skin Health and Integrity  Outcome: Progressing     Problem: Hip Arthroplasty  Goal: Absence of Bleeding  Outcome: Progressing  Goal: Effective Bowel Elimination  Outcome: Progressing  Goal: Effective Urinary Elimination  Outcome: Progressing

## 2025-02-21 NOTE — PT/OT/SLP PROGRESS
"Physical Therapy      Patient Name:  Miguel Moore   MRN:  [id number]    1102 First Attempt  Patient not seen today secondary to Dialysis. Will follow-up as able.    3075 2nd Attempt  Attempted to see pt post HD, however, pt stated " it is not a good day." Pt voiced being fatigue and not feeling well due to BP dropping too low in dialysis. Will re-attempt tomorrow.      "

## 2025-02-22 LAB
ANION GAP SERPL CALC-SCNC: 13 MMOL/L (ref 8–16)
BUN SERPL-MCNC: 26 MG/DL (ref 8–23)
CALCIUM SERPL-MCNC: 8.7 MG/DL (ref 8.7–10.5)
CHLORIDE SERPL-SCNC: 99 MMOL/L (ref 95–110)
CO2 SERPL-SCNC: 22 MMOL/L (ref 23–29)
CREAT SERPL-MCNC: 6.7 MG/DL (ref 0.5–1.4)
EST. GFR  (NO RACE VARIABLE): 8 ML/MIN/1.73 M^2
GLUCOSE SERPL-MCNC: 124 MG/DL (ref 70–110)
POTASSIUM SERPL-SCNC: 4.5 MMOL/L (ref 3.5–5.1)
SODIUM SERPL-SCNC: 134 MMOL/L (ref 136–145)

## 2025-02-22 PROCEDURE — 97110 THERAPEUTIC EXERCISES: CPT | Mod: HCNC

## 2025-02-22 PROCEDURE — 63600175 PHARM REV CODE 636 W HCPCS: Mod: HCNC | Performed by: INTERNAL MEDICINE

## 2025-02-22 PROCEDURE — 21400001 HC TELEMETRY ROOM: Mod: HCNC

## 2025-02-22 PROCEDURE — 25000003 PHARM REV CODE 250: Mod: HCNC | Performed by: HOSPITALIST

## 2025-02-22 PROCEDURE — 25000003 PHARM REV CODE 250: Mod: HCNC | Performed by: ORTHOPAEDIC SURGERY

## 2025-02-22 PROCEDURE — 25000003 PHARM REV CODE 250: Mod: HCNC | Performed by: INTERNAL MEDICINE

## 2025-02-22 PROCEDURE — 97530 THERAPEUTIC ACTIVITIES: CPT | Mod: HCNC

## 2025-02-22 PROCEDURE — 36415 COLL VENOUS BLD VENIPUNCTURE: CPT | Mod: HCNC | Performed by: HOSPITALIST

## 2025-02-22 PROCEDURE — 80048 BASIC METABOLIC PNL TOTAL CA: CPT | Mod: HCNC | Performed by: HOSPITALIST

## 2025-02-22 PROCEDURE — 25000003 PHARM REV CODE 250: Mod: HCNC | Performed by: STUDENT IN AN ORGANIZED HEALTH CARE EDUCATION/TRAINING PROGRAM

## 2025-02-22 RX ADMIN — SEVELAMER CARBONATE 800 MG: 800 TABLET, FILM COATED ORAL at 12:02

## 2025-02-22 RX ADMIN — SEVELAMER CARBONATE 800 MG: 800 TABLET, FILM COATED ORAL at 05:02

## 2025-02-22 RX ADMIN — ONDANSETRON 4 MG: 2 INJECTION INTRAMUSCULAR; INTRAVENOUS at 05:02

## 2025-02-22 RX ADMIN — ATORVASTATIN CALCIUM 80 MG: 40 TABLET, FILM COATED ORAL at 08:02

## 2025-02-22 RX ADMIN — TAMSULOSIN HYDROCHLORIDE 0.8 MG: 0.4 CAPSULE ORAL at 08:02

## 2025-02-22 RX ADMIN — PANTOPRAZOLE SODIUM 40 MG: 40 TABLET, DELAYED RELEASE ORAL at 08:02

## 2025-02-22 RX ADMIN — APIXABAN 5 MG: 5 TABLET, FILM COATED ORAL at 08:02

## 2025-02-22 RX ADMIN — SEVELAMER CARBONATE 800 MG: 800 TABLET, FILM COATED ORAL at 08:02

## 2025-02-22 RX ADMIN — EZETIMIBE 10 MG: 10 TABLET ORAL at 08:02

## 2025-02-22 RX ADMIN — Medication 324 MG: at 08:02

## 2025-02-22 RX ADMIN — ASPIRIN 81 MG CHEWABLE TABLET 81 MG: 81 TABLET CHEWABLE at 08:02

## 2025-02-22 RX ADMIN — FINASTERIDE 5 MG: 5 TABLET, FILM COATED ORAL at 08:02

## 2025-02-22 RX ADMIN — Medication 1 CAPSULE: at 08:02

## 2025-02-22 NOTE — NURSING
Scheduled medications given without difficulty. No complaints of pain/comfort. Dressing to right hip cdi. Heel protector boots in place. Dressing to right foot cdi. Pt on tele monitor #[id number]. Pt on room air; no distress noted. Vitals assessed. Safety measures maintained. Will cont to monitor

## 2025-02-22 NOTE — PLAN OF CARE
Problem: Adult Inpatient Plan of Care  Goal: Plan of Care Review  Outcome: Progressing     Problem: Adult Inpatient Plan of Care  Goal: Patient-Specific Goal (Individualized)  Outcome: Progressing     Problem: Adult Inpatient Plan of Care  Goal: Absence of Hospital-Acquired Illness or Injury  Outcome: Progressing     Problem: Adult Inpatient Plan of Care  Goal: Optimal Comfort and Wellbeing  Outcome: Progressing     Problem: Adult Inpatient Plan of Care  Goal: Readiness for Transition of Care  Outcome: Progressing     Problem: Diabetes Comorbidity  Goal: Blood Glucose Level Within Targeted Range  Outcome: Progressing     Problem: Fall Injury Risk  Goal: Absence of Fall and Fall-Related Injury  Outcome: Progressing     Problem: Skin Injury Risk Increased  Goal: Skin Health and Integrity  Outcome: Progressing

## 2025-02-22 NOTE — CLINICAL REVIEW
Nephrology Chart Review      Intake/Output Summary (Last 24 hours) at 2/22/2025 1250  Last data filed at 2/22/2025 0900  Gross per 24 hour   Intake 600 ml   Output --   Net 600 ml       Vitals:    02/22/25 0707 02/22/25 0710 02/22/25 1103 02/22/25 1117   BP: 102/65  128/71    BP Location: Right arm  Right arm    Patient Position: Lying  Lying    Pulse: 78 77 76 74   Resp: 16  18    Temp: 97.9 °F (36.6 °C)  98.3 °F (36.8 °C)    TempSrc: Oral  Oral    SpO2: 98%  100%    Weight:       Height:           Recent Labs   Lab 02/16/25  0532 02/17/25  0437 02/18/25  0453 02/20/25  0521 02/21/25  0535 02/22/25  0527    134* 137 136 135* 134*   K 4.5 5.0 5.1 4.7 5.0 4.5   CL 97 94* 95 99 98 99   CO2 24 22* 26 25 25 22*   BUN 49* 60* 44* 30* 44* 26*   CREATININE 10.0* 12.2* 8.6* 7.2* 9.5* 6.7*   CALCIUM 8.5* 8.5* 8.5* 8.4* 8.5* 8.7   PHOS 6.2* 6.5* 6.0*  --   --   --            No other related issues identified. Please call Nephrology as needed; We will continue to follow.      Augustine Sandhu MD  Nephrology [facility] [place]

## 2025-02-22 NOTE — NURSING
[facility], [facility] [place]  Nurses Note -- 4 Eyes      2/22/2025     Mepilex changed and intact to sacral area.     Skin assessed on: Q Shift      [] No Pressure Injuries Present    []Prevention Measures Documented    [x] Yes LDA  for Pressure Injury Previously documented     [] Yes New Pressure Injury Discovered   [] LDA for New Pressure Injury Added      Attending RN:  DARRION Jimenez    Second RN:  KAMILA Blake

## 2025-02-22 NOTE — NURSING
Pt alert able to make needs known, tolerates medications well by mouth/ crushed,   Repositioned by family q 2hrs, pain controlled by PRN pain medication, plan of care explained, diet tolerated, pt denies n,v,d, Remains free from falls and hospital acquired pressure injuries, safety maintained. Will continue following plan of care.

## 2025-02-22 NOTE — ASSESSMENT & PLAN NOTE
Creatine stable for now. BMP reviewed- noted Estimated Creatinine Clearance: 7 mL/min (A) (based on SCr of 9.5 mg/dL (H)). according to latest data. Based on current GFR, CKD stage is end stage.  Monitor UOP and serial BMP and adjust therapy as needed. Renally dose meds. Avoid nephrotoxic medications and procedures.    Dialysis Monday Wednesday Friday.  Has left arm fistula.  Nephrology consulted.

## 2025-02-22 NOTE — SUBJECTIVE & OBJECTIVE
Interval History: Patient HDS and afebile. No acute events overnight. Pt is medically stable for DC, pending placement. Undergoing HD today.       Review of Systems   Constitutional:  Positive for activity change and fatigue. Negative for fever.   Respiratory:  Negative for cough and shortness of breath.    Cardiovascular:  Negative for chest pain.   Gastrointestinal:  Positive for constipation. Negative for abdominal pain.   Neurological:  Negative for dizziness and headaches.   Psychiatric/Behavioral:  Negative for confusion.    All other systems reviewed and are negative.    Objective:     Vital Signs (Most Recent):  Temp: 98.5 °F (36.9 °C) (02/21/25 1945)  Pulse: 88 (02/21/25 1945)  Resp: 18 (02/21/25 1945)  BP: 136/86 (02/21/25 1945)  SpO2: 99 % (02/21/25 1945) Vital Signs (24h Range):  Temp:  [98 °F (36.7 °C)-98.5 °F (36.9 °C)] 98.5 °F (36.9 °C)  Pulse:  [] 88  Resp:  [18-20] 18  SpO2:  [96 %-99 %] 99 %  BP: (107-136)/(54-86) 136/86     Weight: 72.9 kg (160 lb 11.5 oz)  Body mass index is 24.44 kg/m².    Intake/Output Summary (Last 24 hours) at 2/21/2025 2138  Last data filed at 2/21/2025 1726  Gross per 24 hour   Intake 360 ml   Output 1500 ml   Net -1140 ml         Physical Exam  Vitals and nursing note reviewed.   Constitutional:       Appearance: Normal appearance.   HENT:      Head: Normocephalic and atraumatic.   Cardiovascular:      Rate and Rhythm: Normal rate and regular rhythm.   Pulmonary:      Effort: Pulmonary effort is normal. No respiratory distress.   Abdominal:      General: There is no distension.   Musculoskeletal:         General: Normal range of motion.   Neurological:      Mental Status: He is alert and oriented to person, place, and time.   Psychiatric:         Mood and Affect: Mood normal.         Behavior: Behavior normal.             Significant Labs: All pertinent labs within the past 24 hours have been reviewed.  CBC:   Recent Labs   Lab 02/20/25  0521   WBC 3.93   HGB 8.0*    HCT 23.5*   *     CMP:   Recent Labs   Lab 02/20/25  0521 02/21/25  0535    135*   K 4.7 5.0   CL 99 98   CO2 25 25   * 124*   BUN 30* 44*   CREATININE 7.2* 9.5*   CALCIUM 8.4* 8.5*   ANIONGAP 12 12       Significant Imaging: I have reviewed all pertinent imaging results/findings within the past 24 hours.

## 2025-02-22 NOTE — PT/OT/SLP PROGRESS
"Physical Therapy Treatment    Patient Name:  Miguel Moore   MRN:  [id number]    Recommendations:     Discharge Recommendations: Moderate Intensity Therapy  Discharge Equipment Recommendations: to be determined by next level of care  Barriers to discharge: None    Assessment:     Miguel Moore is a 69 y.o. male admitted with a medical diagnosis of Closed fracture of right hip.  He presents with the following impairments/functional limitations: weakness, impaired endurance, impaired self care skills, impaired functional mobility, gait instability, impaired balance, decreased coordination, decreased upper extremity function, decreased lower extremity function, decreased safety awareness, pain, decreased ROM, orthopedic precautions. Pt cooperative with PT this date with encouragement; appreciative at end of session that PT came to see him.     Rehab Prognosis: Good; patient would benefit from acute skilled PT services to address these deficits and reach maximum level of function.    Recent Surgery: Procedure(s) (LRB):  ARTHROPLASTY, HIP, TOTAL, USING COMPUTER-ASSISTED NAVIGATION (Right) 5 Days Post-Op    Plan:     During this hospitalization, patient to be seen 6 x/week to address the identified rehab impairments via gait training, therapeutic activities, therapeutic exercises, neuromuscular re-education and progress toward the following goals:    Plan of Care Expires:  03/04/25    Subjective     Chief Complaint: R hip pain with movement  Patient/Family Comments/goals: "We will see what we can do"R  Pain/Comfort:  Pain Rating 1: 5/10  Location - Side 1: Right  Location 1: hip  Pain Addressed 1: Cessation of Activity, Nurse notified  Pain Rating Post-Intervention 1: 5/10      Objective:     Communicated with RN prior to session.  Patient found HOB elevated with pressure relief boots, telemetry, central line upon PT entry to room.     General Precautions: Standard, diabetic, fall, seizure  Orthopedic Precautions: RLE " weight bearing as tolerated, RLE posterior precautions  Braces: N/A  Respiratory Status: Room air     Functional Mobility:  Bed Mobility:     Scooting: minimum assistance  Supine to Sit: minimum assistance with HOB elevated   Sit to Supine: minimum assistance  Transfers:     Sit to Stand:  moderate assistance with hand-held assist and gait belt, noted flexed posture, pt unable to attain upright standing despite cuing. Worsening RLE pain in standing.   Balance: Good at EOB, fair in standing      AM-PAC 6 CLICK MOBILITY  Turning over in bed (including adjusting bedclothes, sheets and blankets)?: 3  Sitting down on and standing up from a chair with arms (e.g., wheelchair, bedside commode, etc.): 2  Moving from lying on back to sitting on the side of the bed?: 3  Moving to and from a bed to a chair (including a wheelchair)?: 2  Need to walk in hospital room?: 1  Climbing 3-5 steps with a railing?: 1  Basic Mobility Total Score: 12       Treatment & Education:  Seated exercises: Ankle pumps x 20, LAQs x 20, glut sets x 20, hip adduction x 20    Educated pt on posterior hip precautions and weight bearing status, pt continues to need reinforcement.     Patient left HOB elevated with all lines intact, call button in reach, and RN notified.    GOALS:   Multidisciplinary Problems       Physical Therapy Goals          Problem: Physical Therapy    Goal Priority Disciplines Outcome Interventions   Physical Therapy Goal     PT, PT/OT Progressing    Description: Goals to be met by: 3/4/25     Patient will increase functional independence with mobility by performin. Supine to sit with supervision  2. Rolling to Left and Right with supervision  3. Sit to stand transfer with Minimal Assistance using Quad Cane  4. Bed to chair transfer with Minimal Assistance   5. Gait x10 feet with Minimal Assistance using Quad Cane   6. Wheelchair propulsion x50 feet with Minimal Assistance using left upper/lower extremity  7. Lower extremity  exercise program 2 sets x10 reps per handout, with supervision                         DME Justifications:  No DME recommended requiring DME justifications    Time Tracking:     PT Received On: 02/22/25  PT Start Time: 0813     PT Stop Time: 0836  PT Total Time (min): 23 min     Billable Minutes: Therapeutic Activity 15 min and Therapeutic Exercise 8 min    Treatment Type: Treatment  PT/PTA: PT     Number of PTA visits since last PT visit: 2     02/22/2025

## 2025-02-22 NOTE — PROGRESS NOTES
[facility]  Telemedicine Progress Note    Patient Name: Miguel Moore  MRN: [id number]  Patient Class: IP- Inpatient   Admission Date: 2/15/2025  Length of Stay: 6 days  Attending Physician: Rosalva Manning MD  Primary Care Provider: Raciel Raymond MD          Subjective:     Principal Problem:Closed fracture of right hip        HPI:  Is a 69-year-old male with history of CVA, hypertension, ESRD on HD, DVT on Eliquis who presents to the emergency department for evaluation after a fall.  Patient reports he gets around with a walker and he slipped and fell to his right hip.  Denies loss of consciousness, hitting of his head or other recent issues.  In the ED, Xray of right hip positive for acute right femoral neck fracture. CT confirmed. Orthopedic Surgery consulted. Plan for OR on 2/17/25. He will be admitted to hospital medicine for further management.     Overview/Hospital Course:  Mr. Moore is a 69-year-old male who was admitted with a right femoral neck fracture.  Orthopedic surgery consulted.S/P right  hip replacement arthroplasty on 02/17/2025.  Nephrology consulted for dialysis.  Wound care is doing wound care on right plantar foot and left heel.  PT,OT ,plan for SNF.    Interval History: Patient HDS and afebile. No acute events overnight. Pt is medically stable for DC, pending placement. Undergoing HD today.       Review of Systems   Constitutional:  Positive for activity change and fatigue. Negative for fever.   Respiratory:  Negative for cough and shortness of breath.    Cardiovascular:  Negative for chest pain.   Gastrointestinal:  Positive for constipation. Negative for abdominal pain.   Neurological:  Negative for dizziness and headaches.   Psychiatric/Behavioral:  Negative for confusion.    All other systems reviewed and are negative.    Objective:     Vital Signs (Most Recent):  Temp: 98.5 °F (36.9 °C) (02/21/25 1945)  Pulse: 88 (02/21/25 1945)  Resp: 18  (02/21/25 1945)  BP: 136/86 (02/21/25 1945)  SpO2: 99 % (02/21/25 1945) Vital Signs (24h Range):  Temp:  [98 °F (36.7 °C)-98.5 °F (36.9 °C)] 98.5 °F (36.9 °C)  Pulse:  [] 88  Resp:  [18-20] 18  SpO2:  [96 %-99 %] 99 %  BP: (107-136)/(54-86) 136/86     Weight: 72.9 kg (160 lb 11.5 oz)  Body mass index is 24.44 kg/m².    Intake/Output Summary (Last 24 hours) at 2/21/2025 2138  Last data filed at 2/21/2025 1726  Gross per 24 hour   Intake 360 ml   Output 1500 ml   Net -1140 ml         Physical Exam  Vitals and nursing note reviewed.   Constitutional:       Appearance: Normal appearance.   HENT:      Head: Normocephalic and atraumatic.   Cardiovascular:      Rate and Rhythm: Normal rate and regular rhythm.   Pulmonary:      Effort: Pulmonary effort is normal. No respiratory distress.   Abdominal:      General: There is no distension.   Musculoskeletal:         General: Normal range of motion.   Neurological:      Mental Status: He is alert and oriented to person, place, and time.   Psychiatric:         Mood and Affect: Mood normal.         Behavior: Behavior normal.             Significant Labs: All pertinent labs within the past 24 hours have been reviewed.  CBC:   Recent Labs   Lab 02/20/25  0521   WBC 3.93   HGB 8.0*   HCT 23.5*   *     CMP:   Recent Labs   Lab 02/20/25  0521 02/21/25  0535    135*   K 4.7 5.0   CL 99 98   CO2 25 25   * 124*   BUN 30* 44*   CREATININE 7.2* 9.5*   CALCIUM 8.4* 8.5*   ANIONGAP 12 12       Significant Imaging: I have reviewed all pertinent imaging results/findings within the past 24 hours.    Assessment/Plan:      * Closed fracture of right hip  Patient presents with a fall, subsequent right femoral neck fracture.  Orthopedic surgery consulted, plan for hip replacement on 02/17/2025.  Continue with supportive care and pain control.    - s/p hip replacement on 2/17/25  - PT/OT  - pain control    Slow transit constipation  -Bowel reg ordered      Wounds,  multiple  Wound care at home for bilateral lower extremities.  Mainly right lower extremity, wound care comes Tuesdays and Thursdays.  We will consult Wound Care for further management.    Wound care is doing wound care on right plantar foot and left heel.    Paroxysmal atrial fibrillation  Patient has paroxysmal (<7 days) atrial fibrillation. Patient is currently in sinus rhythm. [id number] Score: 3. The patients heart rate in the last 24 hours is as follows:  Pulse  Min: 85  Max: 96     Antiarrhythmics       Anticoagulants  heparin 25,000 units in dextrose 5% (100 units/ml) IV bolus from bag LOW INTENSITY nomogram - [facility], Once, Intravenous  heparin 25,000 units in dextrose 5% 250 mL (100 units/mL) infusion LOW INTENSITY nomogram - [facility], Continuous, Intravenous  heparin 25,000 units in dextrose 5% (100 units/ml) IV bolus from bag LOW INTENSITY nomogram - [facility], As needed (PRN), Intravenous  heparin 25,000 units in dextrose 5% (100 units/ml) IV bolus from bag LOW INTENSITY nomogram - [facility], As needed (PRN), Intravenous    Plan  - Replete lytes with a goal of K>4, Mg >2  - Patient is anticoagulated, see medications listed above.  - Patient's afib is currently controlled          Debility  -Patient noted to be medically stable for discharge at this time  -Has been able to work with OT/PT who recommend placement for further rehabilitation  -Patient pending placement, continue in-hospital care as stated above in the meantime  -More than 20 minutes of my time has been spent discussing and planning just her safe disposition with patient/family/CM/SW/Nursing staff (not including other time spent in clinical discussion and management)  -CM/SW following, appreciate input   -Will place DC orders once placement has been secured        Chronic deep vein thrombosis (DVT) of popliteal vein of right lower extremity 9/21/20 outside [place]; unprovoked; anticoagulation  Patient on Eliquis at home.  Transitioned to heparin prior to surgery.  -  "resume eliquis       DM type 2 without retinopathy  Patient's FSGs are controlled on current medication regimen.  Last A1c reviewed-   Lab Results   Component Value Date    HGBA1C 5.8 12/09/2024     Most recent fingerstick glucose reviewed- No results for input(s): "POCTGLUCOSE" in the last 24 hours.  Current correctional scale   none    ESRD (end stage renal disease)  Creatine stable for now. BMP reviewed- noted Estimated Creatinine Clearance: 7 mL/min (A) (based on SCr of 9.5 mg/dL (H)). according to latest data. Based on current GFR, CKD stage is end stage.  Monitor UOP and serial BMP and adjust therapy as needed. Renally dose meds. Avoid nephrotoxic medications and procedures.    Dialysis Monday Wednesday Friday.  Has left arm fistula.  Nephrology consulted.    Hemiplegia and hemiparesis following cerebral infarction affecting right dominant side  Noted      BPH (benign prostatic hyperplasia) 2/18/21 prostate bx normal  Resume home medications        VTE Risk Mitigation (From admission, onward)           Ordered     Place JJ hose  Until discontinued         02/17/25 2224     apixaban tablet 5 mg  2 times daily         02/17/25 1610     IP VTE HIGH RISK PATIENT  Once         02/15/25 1525     Place sequential compression device  Until discontinued         02/15/25 1525                          I have completed this tele-visit without the assistance of a telepresenter.    The attending portion of this evaluation, treatment, and documentation was performed per Rosalva Manning MD via Telemedicine AudioVisual using the secure [employer] software platform with 2 way audio/video. The provider was located off-site and the patient is located in the hospital. The aforementioned video software was utilized to document the relevant history and physical exam    Rosalva Manning MD  Department of Hospital Medicine   [facility] - Med Surg    "

## 2025-02-22 NOTE — NURSING
[facility] [place]  Nurses Note -- 4 Eyes      2/22/2025       Skin assessed on: Q Shift      [] No Pressure Injuries Present    []Prevention Measures Documented    [x] Yes LDA  for Pressure Injury Previously documented     [] Yes New Pressure Injury Discovered   [] LDA for New Pressure Injury Added      Attending RN:  Dain Dahl RN     Second RN:  Jessica Ordonez RN

## 2025-02-22 NOTE — PT/OT/SLP PROGRESS
"Occupational Therapy      Patient Name:  Miguel Moore   MRN:  [id number]    Patient not seen today secondary to Patient unwilling to participate. Pt found supine w/ female guest "Tracey", upon OT entry, Pt stated "Ya'll already came this morning" Pt deferred any OOB Ax @ this time. Will follow-up 2/23/25.    2/22/2025  "

## 2025-02-22 NOTE — PLAN OF CARE
Problem: Adult Inpatient Plan of Care  Goal: Plan of Care Review  Outcome: Progressing  Goal: Patient-Specific Goal (Individualized)  Outcome: Progressing  Goal: Absence of Hospital-Acquired Illness or Injury  Outcome: Progressing  Goal: Optimal Comfort and Wellbeing  Outcome: Progressing  Goal: Readiness for Transition of Care  Outcome: Progressing     Problem: Diabetes Comorbidity  Goal: Blood Glucose Level Within Targeted Range  Outcome: Progressing     Problem: Wound  Goal: Improved Oral Intake  Outcome: Progressing     Problem: Hip Arthroplasty  Goal: Acceptable Pain Control  Outcome: Progressing  Goal: Effective Oxygenation and Ventilation  Outcome: Progressing     Pt alert and oriented. Afebrile. Oxygen maintained @ room air. HD today. 1L removed. C/O fatigue after dialysis. Unable to work with PT/OT. Denies pain. Tolerating diet. Safety maintained.

## 2025-02-23 PROCEDURE — 25000003 PHARM REV CODE 250: Mod: HCNC | Performed by: ORTHOPAEDIC SURGERY

## 2025-02-23 PROCEDURE — 25000003 PHARM REV CODE 250: Mod: HCNC | Performed by: STUDENT IN AN ORGANIZED HEALTH CARE EDUCATION/TRAINING PROGRAM

## 2025-02-23 PROCEDURE — 25000003 PHARM REV CODE 250: Mod: HCNC | Performed by: HOSPITALIST

## 2025-02-23 PROCEDURE — 25000003 PHARM REV CODE 250: Mod: HCNC | Performed by: INTERNAL MEDICINE

## 2025-02-23 PROCEDURE — 21400001 HC TELEMETRY ROOM: Mod: HCNC

## 2025-02-23 RX ADMIN — SEVELAMER CARBONATE 800 MG: 800 TABLET, FILM COATED ORAL at 04:02

## 2025-02-23 RX ADMIN — TAMSULOSIN HYDROCHLORIDE 0.8 MG: 0.4 CAPSULE ORAL at 08:02

## 2025-02-23 RX ADMIN — PANTOPRAZOLE SODIUM 40 MG: 40 TABLET, DELAYED RELEASE ORAL at 08:02

## 2025-02-23 RX ADMIN — SEVELAMER CARBONATE 800 MG: 800 TABLET, FILM COATED ORAL at 12:02

## 2025-02-23 RX ADMIN — FINASTERIDE 5 MG: 5 TABLET, FILM COATED ORAL at 08:02

## 2025-02-23 RX ADMIN — APIXABAN 5 MG: 5 TABLET, FILM COATED ORAL at 08:02

## 2025-02-23 RX ADMIN — SEVELAMER CARBONATE 800 MG: 800 TABLET, FILM COATED ORAL at 08:02

## 2025-02-23 RX ADMIN — ASPIRIN 81 MG CHEWABLE TABLET 81 MG: 81 TABLET CHEWABLE at 08:02

## 2025-02-23 RX ADMIN — HYDROCODONE BITARTRATE AND ACETAMINOPHEN 1 TABLET: 5; 325 TABLET ORAL at 09:02

## 2025-02-23 RX ADMIN — APIXABAN 5 MG: 5 TABLET, FILM COATED ORAL at 09:02

## 2025-02-23 RX ADMIN — Medication 324 MG: at 08:02

## 2025-02-23 RX ADMIN — DOCUSATE SODIUM 100 MG: 100 CAPSULE, LIQUID FILLED ORAL at 09:02

## 2025-02-23 RX ADMIN — OXYCODONE 10 MG: 5 TABLET ORAL at 10:02

## 2025-02-23 RX ADMIN — Medication 1 CAPSULE: at 08:02

## 2025-02-23 RX ADMIN — PANTOPRAZOLE SODIUM 40 MG: 40 TABLET, DELAYED RELEASE ORAL at 09:02

## 2025-02-23 RX ADMIN — ATORVASTATIN CALCIUM 80 MG: 40 TABLET, FILM COATED ORAL at 08:02

## 2025-02-23 RX ADMIN — EZETIMIBE 10 MG: 10 TABLET ORAL at 08:02

## 2025-02-23 NOTE — ASSESSMENT & PLAN NOTE
Patient presents with a fall, subsequent right femoral neck fracture.  Orthopedic surgery consulted, plan for hip replacement on 02/17/2025.  Continue with supportive care and pain control.    - s/p hip replacement on 2/17/25  - PT/OT  - pain control  - pending SNF placement

## 2025-02-23 NOTE — SUBJECTIVE & OBJECTIVE
"Interval History: Patient HDS and afebile. No acute events overnight. No new complaints this morning. Pt is medically stable for DC, pending placement.       Review of Systems   Constitutional:  Positive for activity change and fatigue. Negative for fever.   Respiratory:  Negative for cough and shortness of breath.    Cardiovascular:  Negative for chest pain.   Gastrointestinal:  Positive for constipation. Negative for abdominal pain.   Neurological:  Negative for dizziness and headaches.   Psychiatric/Behavioral:  Negative for confusion.    All other systems reviewed and are negative.    Objective:     Vital Signs (Most Recent):  Temp: 97.8 °F (36.6 °C) (02/23/25 1120)  Pulse: 84 (02/23/25 1155)  Resp: (!) 21 (02/23/25 1120)  BP: (!) 145/82 (02/23/25 1120)  SpO2: 99 % (02/23/25 1120) Vital Signs (24h Range):  Temp:  [97.8 °F (36.6 °C)-98.8 °F (37.1 °C)] 97.8 °F (36.6 °C)  Pulse:  [75-92] 84  Resp:  [18-21] 21  SpO2:  [98 %-100 %] 99 %  BP: (116-145)/(56-82) 145/82     Weight: 72.9 kg (160 lb 11.5 oz)  Body mass index is 24.44 kg/m².    Intake/Output Summary (Last 24 hours) at 2/23/2025 1241  Last data filed at 2/23/2025 0813  Gross per 24 hour   Intake 600 ml   Output --   Net 600 ml         Physical Exam  Vitals and nursing note reviewed.   Constitutional:       Appearance: Normal appearance.   HENT:      Head: Normocephalic and atraumatic.   Cardiovascular:      Rate and Rhythm: Normal rate and regular rhythm.   Pulmonary:      Effort: Pulmonary effort is normal. No respiratory distress.   Abdominal:      General: There is no distension.   Musculoskeletal:         General: Normal range of motion.   Neurological:      Mental Status: He is alert and oriented to person, place, and time.   Psychiatric:         Mood and Affect: Mood normal.         Behavior: Behavior normal.             Significant Labs: All pertinent labs within the past 24 hours have been reviewed.  CBC:   No results for input(s): "WBC", "HGB", " ""HCT", "PLT" in the last 48 hours.    CMP:   Recent Labs   Lab 02/22/25  0527   *   K 4.5   CL 99   CO2 22*   *   BUN 26*   CREATININE 6.7*   CALCIUM 8.7   ANIONGAP 13       Significant Imaging: I have reviewed all pertinent imaging results/findings within the past 24 hours.  "

## 2025-02-23 NOTE — NURSING
[facility]  Nurses Note -- 4 Eyes      2/23/2025       Skin assessed on: Q Shift      [] No Pressure Injuries Present    []Prevention Measures Documented    [x] Yes LDA  for Pressure Injury Previously documented     [] Yes New Pressure Injury Discovered   [] LDA for New Pressure Injury Added      Attending RN:  DARRION Jimenez    Second RN:  KAMILA Brandt

## 2025-02-23 NOTE — PROGRESS NOTES
[facility] Medicine  Telemedicine Progress Note    Patient Name: Miguel Moore  MRN: [id number]  Patient Class: IP- Inpatient   Admission Date: 2/15/2025  Length of Stay: 8 days  Attending Physician: Rosalva Manning MD  Primary Care Provider: Raciel Raymond MD          Subjective:     Principal Problem:Closed fracture of right hip        HPI:  Is a 69-year-old male with history of CVA, hypertension, ESRD on HD, DVT on Eliquis who presents to the emergency department for evaluation after a fall.  Patient reports he gets around with a walker and he slipped and fell to his right hip.  Denies loss of consciousness, hitting of his head or other recent issues.  In the ED, Xray of right hip positive for acute right femoral neck fracture. CT confirmed. Orthopedic Surgery consulted. Plan for OR on 2/17/25. He will be admitted to hospital medicine for further management.     Overview/Hospital Course:  Mr. Moore is a 69-year-old male who was admitted with a right femoral neck fracture.  Orthopedic surgery consulted.S/P right  hip replacement arthroplasty on 02/17/2025.  Nephrology consulted for dialysis.  Wound care is doing wound care on right plantar foot and left heel.  PT,OT ,plan for SNF.    Interval History: Patient HDS and afebile. No acute events overnight. No new complaints this morning. Pt is medically stable for DC, pending placement.       Review of Systems   Constitutional:  Positive for activity change and fatigue. Negative for fever.   Respiratory:  Negative for cough and shortness of breath.    Cardiovascular:  Negative for chest pain.   Gastrointestinal:  Positive for constipation. Negative for abdominal pain.   Neurological:  Negative for dizziness and headaches.   Psychiatric/Behavioral:  Negative for confusion.    All other systems reviewed and are negative.    Objective:     Vital Signs (Most Recent):  Temp: 97.8 °F (36.6 °C) (02/23/25 1120)  Pulse: 84 (02/23/25 1155)  Resp:  "(!) 21 (02/23/25 1120)  BP: (!) 145/82 (02/23/25 1120)  SpO2: 99 % (02/23/25 1120) Vital Signs (24h Range):  Temp:  [97.8 °F (36.6 °C)-98.8 °F (37.1 °C)] 97.8 °F (36.6 °C)  Pulse:  [75-92] 84  Resp:  [18-21] 21  SpO2:  [98 %-100 %] 99 %  BP: (116-145)/(56-82) 145/82     Weight: 72.9 kg (160 lb 11.5 oz)  Body mass index is 24.44 kg/m².    Intake/Output Summary (Last 24 hours) at 2/23/2025 1241  Last data filed at 2/23/2025 0813  Gross per 24 hour   Intake 600 ml   Output --   Net 600 ml         Physical Exam  Vitals and nursing note reviewed.   Constitutional:       Appearance: Normal appearance.   HENT:      Head: Normocephalic and atraumatic.   Cardiovascular:      Rate and Rhythm: Normal rate and regular rhythm.   Pulmonary:      Effort: Pulmonary effort is normal. No respiratory distress.   Abdominal:      General: There is no distension.   Musculoskeletal:         General: Normal range of motion.   Neurological:      Mental Status: He is alert and oriented to person, place, and time.   Psychiatric:         Mood and Affect: Mood normal.         Behavior: Behavior normal.             Significant Labs: All pertinent labs within the past 24 hours have been reviewed.  CBC:   No results for input(s): "WBC", "HGB", "HCT", "PLT" in the last 48 hours.    CMP:   Recent Labs   Lab 02/22/25  0527   *   K 4.5   CL 99   CO2 22*   *   BUN 26*   CREATININE 6.7*   CALCIUM 8.7   ANIONGAP 13       Significant Imaging: I have reviewed all pertinent imaging results/findings within the past 24 hours.    Assessment/Plan:      * Closed fracture of right hip  Patient presents with a fall, subsequent right femoral neck fracture.  Orthopedic surgery consulted, plan for hip replacement on 02/17/2025.  Continue with supportive care and pain control.    - s/p hip replacement on 2/17/25  - PT/OT  - pain control  - pending SNF placement     Slow transit constipation  -Bowel reg ordered  -resolved    Wounds, multiple  Wound care at " "home for bilateral lower extremities.  Mainly right lower extremity, wound care comes Tuesdays and Thursdays.  We will consult Wound Care for further management.    Wound care is doing wound care on right plantar foot and left heel.    Paroxysmal atrial fibrillation  Patient has paroxysmal (<7 days) atrial fibrillation. Patient is currently in sinus rhythm. [id number] Score: 3. The patients heart rate in the last 24 hours is as follows:  Pulse  Min: 74  Max: 95     Antiarrhythmics       Anticoagulants  apixaban tablet 5 mg, 2 times daily, Oral    Plan  - Replete lytes with a goal of K>4, Mg >2  - Patient is anticoagulated, see medications listed above.  - Patient's afib is currently controlled          Debility  -Patient noted to be medically stable for discharge at this time  -Has been able to work with OT/PT who recommend placement for further rehabilitation  -Patient pending placement, continue in-hospital care as stated above in the meantime  -More than 20 minutes of my time has been spent discussing and planning just her safe disposition with patient/family/CM/SW/Nursing staff (not including other time spent in clinical discussion and management)  -CM/SW following, appreciate input   -Will place DC orders once placement has been secured        Chronic deep vein thrombosis (DVT) of popliteal vein of right lower extremity 9/21/20 outside [place]; unprovoked; anticoagulation  Patient on Eliquis at home.  Transitioned to heparin prior to surgery.  - resume eliquis       DM type 2 without retinopathy  Patient's FSGs are controlled on current medication regimen.  Last A1c reviewed-   Lab Results   Component Value Date    HGBA1C 5.8 12/09/2024     Most recent fingerstick glucose reviewed- No results for input(s): "POCTGLUCOSE" in the last 24 hours.  Current correctional scale   none    ESRD (end stage renal disease)  Creatine stable for now. BMP reviewed- noted Estimated Creatinine Clearance: 9.9 mL/min (A) (based on SCr " of 6.7 mg/dL (H)). according to latest data. Based on current GFR, CKD stage is end stage.  Monitor UOP and serial BMP and adjust therapy as needed. Renally dose meds. Avoid nephrotoxic medications and procedures.    Dialysis Monday Wednesday Friday.  Has left arm fistula.  Nephrology consulted.  Continue HD    Hemiplegia and hemiparesis following cerebral infarction affecting right dominant side  Noted      BPH (benign prostatic hyperplasia) 2/18/21 prostate bx normal  Resume home medications        VTE Risk Mitigation (From admission, onward)           Ordered     Place JJ hose  Until discontinued         02/17/25 2224     apixaban tablet 5 mg  2 times daily         02/17/25 1610     IP VTE HIGH RISK PATIENT  Once         02/15/25 1525     Place sequential compression device  Until discontinued         02/15/25 1525                          I have completed this tele-visit without the assistance of a telepresenter.    The attending portion of this evaluation, treatment, and documentation was performed per Rosalva Manning MD via Telemedicine AudioVisual using the secure [employer] software platform with 2 way audio/video. The provider was located off-site and the patient is located in the hospital. The aforementioned video software was utilized to document the relevant history and physical exam    Rosalva Manning MD  Department of Hospital Medicine   [facility]

## 2025-02-23 NOTE — ASSESSMENT & PLAN NOTE
Patient has paroxysmal (<7 days) atrial fibrillation. Patient is currently in sinus rhythm. [id number] Score: 3. The patients heart rate in the last 24 hours is as follows:  Pulse  Min: 74  Max: 95     Antiarrhythmics       Anticoagulants  apixaban tablet 5 mg, 2 times daily, Oral    Plan  - Replete lytes with a goal of K>4, Mg >2  - Patient is anticoagulated, see medications listed above.  - Patient's afib is currently controlled

## 2025-02-23 NOTE — NURSING
[facility] [place]  Nurses Note -- 4 Eyes      2/23/2025       Skin assessed on: Q Shift      [] No Pressure Injuries Present    [x]Prevention Measures Documented    [x] Yes LDA  for Pressure Injury Previously documented     [] Yes New Pressure Injury Discovered   [] LDA for New Pressure Injury Added      Attending RN:  Olivia Ordonez, RN     Second RN:  Tony Goldberg

## 2025-02-23 NOTE — ASSESSMENT & PLAN NOTE
Creatine stable for now. BMP reviewed- noted Estimated Creatinine Clearance: 9.9 mL/min (A) (based on SCr of 6.7 mg/dL (H)). according to latest data. Based on current GFR, CKD stage is end stage.  Monitor UOP and serial BMP and adjust therapy as needed. Renally dose meds. Avoid nephrotoxic medications and procedures.    Dialysis Monday Wednesday Friday.  Has left arm fistula.  Nephrology consulted.  Continue HD

## 2025-02-23 NOTE — SUBJECTIVE & OBJECTIVE
Interval History: Patient HDS and afebile. No acute events overnight. No new complaints this morning. Eating and drinking well, having BMs Pt is medically stable for DC, pending placement.       Review of Systems   Constitutional:  Positive for activity change and fatigue. Negative for fever.   Respiratory:  Negative for cough and shortness of breath.    Cardiovascular:  Negative for chest pain.   Gastrointestinal:  Positive for constipation. Negative for abdominal pain.   Neurological:  Negative for dizziness and headaches.   Psychiatric/Behavioral:  Negative for confusion.    All other systems reviewed and are negative.    Objective:     Vital Signs (Most Recent):  Temp: 98.7 °F (37.1 °C) (02/22/25 1935)  Pulse: 84 (02/22/25 1935)  Resp: 19 (02/22/25 1935)  BP: 131/64 (02/22/25 1935)  SpO2: 100 % (02/22/25 1935) Vital Signs (24h Range):  Temp:  [97.9 °F (36.6 °C)-98.7 °F (37.1 °C)] 98.7 °F (37.1 °C)  Pulse:  [74-95] 84  Resp:  [16-19] 19  SpO2:  [97 %-100 %] 100 %  BP: (102-131)/(64-75) 131/64     Weight: 72.9 kg (160 lb 11.5 oz)  Body mass index is 24.44 kg/m².    Intake/Output Summary (Last 24 hours) at 2/22/2025 1947  Last data filed at 2/22/2025 1700  Gross per 24 hour   Intake 720 ml   Output --   Net 720 ml         Physical Exam  Vitals and nursing note reviewed.   Constitutional:       Appearance: Normal appearance.   HENT:      Head: Normocephalic and atraumatic.   Cardiovascular:      Rate and Rhythm: Normal rate and regular rhythm.   Pulmonary:      Effort: Pulmonary effort is normal. No respiratory distress.   Abdominal:      General: There is no distension.   Musculoskeletal:         General: Normal range of motion.   Neurological:      Mental Status: He is alert and oriented to person, place, and time.   Psychiatric:         Mood and Affect: Mood normal.         Behavior: Behavior normal.             Significant Labs: All pertinent labs within the past 24 hours have been reviewed.  CBC:   No results  "for input(s): "WBC", "HGB", "HCT", "PLT" in the last 48 hours.    CMP:   Recent Labs   Lab 02/21/25  0535 02/22/25  0527   * 134*   K 5.0 4.5   CL 98 99   CO2 25 22*   * 124*   BUN 44* 26*   CREATININE 9.5* 6.7*   CALCIUM 8.5* 8.7   ANIONGAP 12 13       Significant Imaging: I have reviewed all pertinent imaging results/findings within the past 24 hours.  "

## 2025-02-23 NOTE — PROGRESS NOTES
[facility]  Telemedicine Progress Note    Patient Name: Miguel Moore  MRN: [id number]  Patient Class: IP- Inpatient   Admission Date: 2/15/2025  Length of Stay: 7 days  Attending Physician: Rosalva Manning MD  Primary Care Provider: Raciel Raymond MD          Subjective:     Principal Problem:Closed fracture of right hip        HPI:  Is a 69-year-old male with history of CVA, hypertension, ESRD on HD, DVT on Eliquis who presents to the emergency department for evaluation after a fall.  Patient reports he gets around with a walker and he slipped and fell to his right hip.  Denies loss of consciousness, hitting of his head or other recent issues.  In the ED, Xray of right hip positive for acute right femoral neck fracture. CT confirmed. Orthopedic Surgery consulted. Plan for OR on 2/17/25. He will be admitted to hospital medicine for further management.     Overview/Hospital Course:  Mr. Moore is a 69-year-old male who was admitted with a right femoral neck fracture.  Orthopedic surgery consulted.S/P right  hip replacement arthroplasty on 02/17/2025.  Nephrology consulted for dialysis.  Wound care is doing wound care on right plantar foot and left heel.  PT,OT ,plan for SNF.    Interval History: Patient HDS and afebile. No acute events overnight. No new complaints this morning. Eating and drinking well, having BMs Pt is medically stable for DC, pending placement.       Review of Systems   Constitutional:  Positive for activity change and fatigue. Negative for fever.   Respiratory:  Negative for cough and shortness of breath.    Cardiovascular:  Negative for chest pain.   Gastrointestinal:  Positive for constipation. Negative for abdominal pain.   Neurological:  Negative for dizziness and headaches.   Psychiatric/Behavioral:  Negative for confusion.    All other systems reviewed and are negative.    Objective:     Vital Signs (Most Recent):  Temp: 98.7 °F (37.1 °C) (02/22/25  "1935)  Pulse: 84 (02/22/25 1935)  Resp: 19 (02/22/25 1935)  BP: 131/64 (02/22/25 1935)  SpO2: 100 % (02/22/25 1935) Vital Signs (24h Range):  Temp:  [97.9 °F (36.6 °C)-98.7 °F (37.1 °C)] 98.7 °F (37.1 °C)  Pulse:  [74-95] 84  Resp:  [16-19] 19  SpO2:  [97 %-100 %] 100 %  BP: (102-131)/(64-75) 131/64     Weight: 72.9 kg (160 lb 11.5 oz)  Body mass index is 24.44 kg/m².    Intake/Output Summary (Last 24 hours) at 2/22/2025 1947  Last data filed at 2/22/2025 1700  Gross per 24 hour   Intake 720 ml   Output --   Net 720 ml         Physical Exam  Vitals and nursing note reviewed.   Constitutional:       Appearance: Normal appearance.   HENT:      Head: Normocephalic and atraumatic.   Cardiovascular:      Rate and Rhythm: Normal rate and regular rhythm.   Pulmonary:      Effort: Pulmonary effort is normal. No respiratory distress.   Abdominal:      General: There is no distension.   Musculoskeletal:         General: Normal range of motion.   Neurological:      Mental Status: He is alert and oriented to person, place, and time.   Psychiatric:         Mood and Affect: Mood normal.         Behavior: Behavior normal.             Significant Labs: All pertinent labs within the past 24 hours have been reviewed.  CBC:   No results for input(s): "WBC", "HGB", "HCT", "PLT" in the last 48 hours.    CMP:   Recent Labs   Lab 02/21/25  0535 02/22/25  0527   * 134*   K 5.0 4.5   CL 98 99   CO2 25 22*   * 124*   BUN 44* 26*   CREATININE 9.5* 6.7*   CALCIUM 8.5* 8.7   ANIONGAP 12 13       Significant Imaging: I have reviewed all pertinent imaging results/findings within the past 24 hours.    Assessment/Plan:      * Closed fracture of right hip  Patient presents with a fall, subsequent right femoral neck fracture.  Orthopedic surgery consulted, plan for hip replacement on 02/17/2025.  Continue with supportive care and pain control.    - s/p hip replacement on 2/17/25  - PT/OT  - pain control  - pending SNF placement     Slow " "transit constipation  -Bowel reg ordered  -resolved    Wounds, multiple  Wound care at home for bilateral lower extremities.  Mainly right lower extremity, wound care comes Tuesdays and Thursdays.  We will consult Wound Care for further management.    Wound care is doing wound care on right plantar foot and left heel.    Paroxysmal atrial fibrillation  Patient has paroxysmal (<7 days) atrial fibrillation. Patient is currently in sinus rhythm. [id number] Score: 3. The patients heart rate in the last 24 hours is as follows:  Pulse  Min: 74  Max: 95     Antiarrhythmics       Anticoagulants  apixaban tablet 5 mg, 2 times daily, Oral    Plan  - Replete lytes with a goal of K>4, Mg >2  - Patient is anticoagulated, see medications listed above.  - Patient's afib is currently controlled          Debility  -Patient noted to be medically stable for discharge at this time  -Has been able to work with OT/PT who recommend placement for further rehabilitation  -Patient pending placement, continue in-hospital care as stated above in the meantime  -More than 20 minutes of my time has been spent discussing and planning just her safe disposition with patient/family/CM/SW/Nursing staff (not including other time spent in clinical discussion and management)  -CM/SW following, appreciate input   -Will place DC orders once placement has been secured        Chronic deep vein thrombosis (DVT) of popliteal vein of right lower extremity 9/21/20 outside [place]; unprovoked; anticoagulation  Patient on Eliquis at home.  Transitioned to heparin prior to surgery.  - resume eliquis       DM type 2 without retinopathy  Patient's FSGs are controlled on current medication regimen.  Last A1c reviewed-   Lab Results   Component Value Date    HGBA1C 5.8 12/09/2024     Most recent fingerstick glucose reviewed- No results for input(s): "POCTGLUCOSE" in the last 24 hours.  Current correctional scale   none    ESRD (end stage renal disease)  Creatine stable for " now. BMP reviewed- noted Estimated Creatinine Clearance: 9.9 mL/min (A) (based on SCr of 6.7 mg/dL (H)). according to latest data. Based on current GFR, CKD stage is end stage.  Monitor UOP and serial BMP and adjust therapy as needed. Renally dose meds. Avoid nephrotoxic medications and procedures.    Dialysis Monday Wednesday Friday.  Has left arm fistula.  Nephrology consulted.  Continue HD    Hemiplegia and hemiparesis following cerebral infarction affecting right dominant side  Noted      BPH (benign prostatic hyperplasia) 2/18/21 prostate bx normal  Resume home medications        VTE Risk Mitigation (From admission, onward)           Ordered     Place JJ hose  Until discontinued         02/17/25 2224     apixaban tablet 5 mg  2 times daily         02/17/25 1610     IP VTE HIGH RISK PATIENT  Once         02/15/25 1525     Place sequential compression device  Until discontinued         02/15/25 1525                          I have completed this tele-visit without the assistance of a telepresenter.    The attending portion of this evaluation, treatment, and documentation was performed per Rosalva Manning MD via Telemedicine AudioVisual using the secure [employer] software platform with 2 way audio/video. The provider was located off-site and the patient is located in the hospital. The aforementioned video software was utilized to document the relevant history and physical exam    Rosalva Manning MD  Department of Hospital Medicine   [facility]

## 2025-02-23 NOTE — CLINICAL REVIEW
Nephrology Chart Review      Intake/Output Summary (Last 24 hours) at 2/23/2025 1228  Last data filed at 2/23/2025 0813  Gross per 24 hour   Intake 600 ml   Output --   Net 600 ml       Vitals:    02/23/25 0738 02/23/25 1025 02/23/25 1120 02/23/25 1155   BP: 131/72  (!) 145/82    BP Location: Right arm  Right arm    Patient Position: Sitting  Sitting    Pulse: 85  81 84   Resp: (!) 21 20 (!) 21    Temp: 98.1 °F (36.7 °C)  97.8 °F (36.6 °C)    TempSrc: Oral  Oral    SpO2: 98%  99%    Weight:       Height:           Recent Labs   Lab 02/17/25  0437 02/18/25  0453 02/20/25  0521 02/21/25  0535 02/22/25  0527   * 137 136 135* 134*   K 5.0 5.1 4.7 5.0 4.5   CL 94* 95 99 98 99   CO2 22* 26 25 25 22*   BUN 60* 44* 30* 44* 26*   CREATININE 12.2* 8.6* 7.2* 9.5* 6.7*   CALCIUM 8.5* 8.5* 8.4* 8.5* 8.7   PHOS 6.5* 6.0*  --   --   --        HD tomorrow    No other related issues identified. Please call Nephrology as needed; We will continue to follow.      Augustine Sandhu MD  Nephrology [facility]

## 2025-02-23 NOTE — NURSING
Scheduled medications given without difficulty. No complaints of pain/discomfort. IV saline lock. Heel protector boots remain in place. Pt on tele monitor #[id number]. Vitals assessed; no distress noted. Safety measures maintained. Will cont to monitor

## 2025-02-24 LAB
ANION GAP SERPL CALC-SCNC: 13 MMOL/L (ref 8–16)
BASOPHILS # BLD AUTO: 0.02 K/UL (ref 0–0.2)
BASOPHILS NFR BLD: 0.5 % (ref 0–1.9)
BUN SERPL-MCNC: 48 MG/DL (ref 8–23)
CALCIUM SERPL-MCNC: 8.4 MG/DL (ref 8.7–10.5)
CHLORIDE SERPL-SCNC: 99 MMOL/L (ref 95–110)
CO2 SERPL-SCNC: 21 MMOL/L (ref 23–29)
CREAT SERPL-MCNC: 10.7 MG/DL (ref 0.5–1.4)
DIFFERENTIAL METHOD BLD: ABNORMAL
EOSINOPHIL # BLD AUTO: 0.2 K/UL (ref 0–0.5)
EOSINOPHIL NFR BLD: 5.6 % (ref 0–8)
ERYTHROCYTE [DISTWIDTH] IN BLOOD BY AUTOMATED COUNT: 14.1 % (ref 11.5–14.5)
EST. GFR  (NO RACE VARIABLE): 5 ML/MIN/1.73 M^2
GLUCOSE SERPL-MCNC: 113 MG/DL (ref 70–110)
HCT VFR BLD AUTO: 22.8 % (ref 40–54)
HGB BLD-MCNC: 7.5 G/DL (ref 14–18)
IMM GRANULOCYTES # BLD AUTO: 0.03 K/UL (ref 0–0.04)
IMM GRANULOCYTES NFR BLD AUTO: 0.7 % (ref 0–0.5)
LYMPHOCYTES # BLD AUTO: 0.9 K/UL (ref 1–4.8)
LYMPHOCYTES NFR BLD: 20.5 % (ref 18–48)
MAGNESIUM SERPL-MCNC: 2.4 MG/DL (ref 1.6–2.6)
MCH RBC QN AUTO: 27.3 PG (ref 27–31)
MCHC RBC AUTO-ENTMCNC: 32.9 G/DL (ref 32–36)
MCV RBC AUTO: 83 FL (ref 82–98)
MONOCYTES # BLD AUTO: 0.4 K/UL (ref 0.3–1)
MONOCYTES NFR BLD: 9.9 % (ref 4–15)
NEUTROPHILS # BLD AUTO: 2.7 K/UL (ref 1.8–7.7)
NEUTROPHILS NFR BLD: 62.8 % (ref 38–73)
NRBC BLD-RTO: 0 /100 WBC
PHOSPHATE SERPL-MCNC: 4 MG/DL (ref 2.7–4.5)
PLATELET # BLD AUTO: 174 K/UL (ref 150–450)
PMV BLD AUTO: 9.5 FL (ref 9.2–12.9)
POTASSIUM SERPL-SCNC: 4.6 MMOL/L (ref 3.5–5.1)
RBC # BLD AUTO: 2.75 M/UL (ref 4.6–6.2)
SODIUM SERPL-SCNC: 133 MMOL/L (ref 136–145)
WBC # BLD AUTO: 4.25 K/UL (ref 3.9–12.7)

## 2025-02-24 PROCEDURE — 25000003 PHARM REV CODE 250: Mod: HCNC | Performed by: INTERNAL MEDICINE

## 2025-02-24 PROCEDURE — 25000003 PHARM REV CODE 250: Mod: HCNC | Performed by: ORTHOPAEDIC SURGERY

## 2025-02-24 PROCEDURE — 21400001 HC TELEMETRY ROOM: Mod: HCNC

## 2025-02-24 PROCEDURE — 80100016 HC MAINTENANCE HEMODIALYSIS: Mod: HCNC

## 2025-02-24 PROCEDURE — 63600175 PHARM REV CODE 636 W HCPCS: Mod: TB,HCNC | Performed by: STUDENT IN AN ORGANIZED HEALTH CARE EDUCATION/TRAINING PROGRAM

## 2025-02-24 PROCEDURE — 83735 ASSAY OF MAGNESIUM: CPT | Mod: HCNC | Performed by: HOSPITALIST

## 2025-02-24 PROCEDURE — 25000003 PHARM REV CODE 250: Mod: HCNC | Performed by: HOSPITALIST

## 2025-02-24 PROCEDURE — 25000003 PHARM REV CODE 250: Mod: HCNC | Performed by: STUDENT IN AN ORGANIZED HEALTH CARE EDUCATION/TRAINING PROGRAM

## 2025-02-24 PROCEDURE — 36415 COLL VENOUS BLD VENIPUNCTURE: CPT | Mod: HCNC | Performed by: HOSPITALIST

## 2025-02-24 PROCEDURE — 85025 COMPLETE CBC W/AUTO DIFF WBC: CPT | Mod: HCNC | Performed by: HOSPITALIST

## 2025-02-24 PROCEDURE — 84100 ASSAY OF PHOSPHORUS: CPT | Mod: HCNC | Performed by: HOSPITALIST

## 2025-02-24 PROCEDURE — 80048 BASIC METABOLIC PNL TOTAL CA: CPT | Mod: HCNC | Performed by: HOSPITALIST

## 2025-02-24 PROCEDURE — 90935 HEMODIALYSIS ONE EVALUATION: CPT | Mod: HCNC,,, | Performed by: INTERNAL MEDICINE

## 2025-02-24 RX ADMIN — FINASTERIDE 5 MG: 5 TABLET, FILM COATED ORAL at 08:02

## 2025-02-24 RX ADMIN — ASPIRIN 81 MG CHEWABLE TABLET 81 MG: 81 TABLET CHEWABLE at 08:02

## 2025-02-24 RX ADMIN — PANTOPRAZOLE SODIUM 40 MG: 40 TABLET, DELAYED RELEASE ORAL at 09:02

## 2025-02-24 RX ADMIN — Medication 1 CAPSULE: at 08:02

## 2025-02-24 RX ADMIN — EPOETIN ALFA-EPBX 3640 UNITS: 4000 INJECTION, SOLUTION INTRAVENOUS; SUBCUTANEOUS at 08:02

## 2025-02-24 RX ADMIN — APIXABAN 5 MG: 5 TABLET, FILM COATED ORAL at 08:02

## 2025-02-24 RX ADMIN — SEVELAMER CARBONATE 800 MG: 800 TABLET, FILM COATED ORAL at 06:02

## 2025-02-24 RX ADMIN — SEVELAMER CARBONATE 800 MG: 800 TABLET, FILM COATED ORAL at 03:02

## 2025-02-24 RX ADMIN — HYDROCODONE BITARTRATE AND ACETAMINOPHEN 1 TABLET: 5; 325 TABLET ORAL at 09:02

## 2025-02-24 RX ADMIN — POLYETHYLENE GLYCOL 3350 17 G: 17 POWDER, FOR SOLUTION ORAL at 08:02

## 2025-02-24 RX ADMIN — TAMSULOSIN HYDROCHLORIDE 0.8 MG: 0.4 CAPSULE ORAL at 08:02

## 2025-02-24 RX ADMIN — DOCUSATE SODIUM 100 MG: 100 CAPSULE, LIQUID FILLED ORAL at 09:02

## 2025-02-24 RX ADMIN — ATORVASTATIN CALCIUM 80 MG: 40 TABLET, FILM COATED ORAL at 08:02

## 2025-02-24 RX ADMIN — PANTOPRAZOLE SODIUM 40 MG: 40 TABLET, DELAYED RELEASE ORAL at 08:02

## 2025-02-24 RX ADMIN — OXYCODONE 10 MG: 5 TABLET ORAL at 03:02

## 2025-02-24 RX ADMIN — Medication 324 MG: at 07:02

## 2025-02-24 RX ADMIN — EZETIMIBE 10 MG: 10 TABLET ORAL at 08:02

## 2025-02-24 RX ADMIN — DOCUSATE SODIUM 100 MG: 100 CAPSULE, LIQUID FILLED ORAL at 08:02

## 2025-02-24 RX ADMIN — SEVELAMER CARBONATE 800 MG: 800 TABLET, FILM COATED ORAL at 07:02

## 2025-02-24 RX ADMIN — APIXABAN 5 MG: 5 TABLET, FILM COATED ORAL at 09:02

## 2025-02-24 NOTE — NURSING
Patient got back from HD via bed. Awake and with no complaints. Offered and warmed his lunch. Dressing was intact, dry and clean. No other unusualities

## 2025-02-24 NOTE — PLAN OF CARE
Problem: Adult Inpatient Plan of Care  Goal: Plan of Care Review  Outcome: Progressing  Goal: Patient-Specific Goal (Individualized)  Outcome: Progressing  Goal: Absence of Hospital-Acquired Illness or Injury  Outcome: Progressing  Goal: Optimal Comfort and Wellbeing  Outcome: Progressing  Goal: Readiness for Transition of Care  Outcome: Progressing     Problem: Diabetes Comorbidity  Goal: Blood Glucose Level Within Targeted Range  Outcome: Progressing     Problem: Wound  Goal: Optimal Coping  Outcome: Progressing  Goal: Optimal Functional Ability  Outcome: Progressing  Goal: Absence of Infection Signs and Symptoms  Outcome: Progressing  Goal: Improved Oral Intake  Outcome: Progressing  Goal: Optimal Pain Control and Function  Outcome: Progressing  Goal: Skin Health and Integrity  Outcome: Progressing  Goal: Optimal Wound Healing  Outcome: Progressing     Problem: Fall Injury Risk  Goal: Absence of Fall and Fall-Related Injury  Outcome: Progressing     Problem: Skin Injury Risk Increased  Goal: Skin Health and Integrity  Outcome: Progressing     Problem: Hemodialysis  Goal: Safe, Effective Therapy Delivery  Outcome: Progressing  Goal: Effective Tissue Perfusion  Outcome: Progressing  Goal: Absence of Infection Signs and Symptoms  Outcome: Progressing     Problem: Hip Arthroplasty  Goal: Optimal Coping  Outcome: Progressing  Goal: Absence of Bleeding  Outcome: Progressing  Goal: Effective Bowel Elimination  Outcome: Progressing  Goal: Fluid and Electrolyte Balance  Outcome: Progressing  Goal: Optimal Functional Ability  Outcome: Progressing  Goal: Absence of Infection Signs and Symptoms  Outcome: Progressing  Goal: Intact Neurovascular Status  Outcome: Progressing  Goal: Anesthesia/Sedation Recovery  Outcome: Progressing  Goal: Acceptable Pain Control  Outcome: Progressing  Goal: Nausea and Vomiting Relief  Outcome: Progressing  Goal: Effective Urinary Elimination  Outcome: Progressing  Goal: Effective Oxygenation  and Ventilation  Outcome: Progressing

## 2025-02-24 NOTE — PROGRESS NOTES
Patient seen and examined during hemodialysis this morning.  /76, HR 97. . AP -147,  182. LUE AVF.  Tolerating treatment well. No complaints.     ESRD - HD MWF.  Anemia of CKD - hgb 11.8 on arrival --> 7.5 today. Ferritin > 3700. Stopping oral iron. Increasing PERRY dose today.   Secondary HPTH - phos controlled; continue sevelamer.  Hyponatremia - Na 133 today; HD as above.      Thank you for allowing me to participate in the care of this patient. Please call with any questions.     Jessica Hagen MD  [facility]  [phone]

## 2025-02-24 NOTE — PLAN OF CARE
8:41am: MEET spoke to [facility] with [facility] and [facility], [phone].  She stated she will meet with patient this afternoon to complete admission paperwork.  CM will follow up with patient regarding bank statements     CM spoke with patient at bedside.  He stated he has not asked anyone to bring his [employer] card to him but spoke with a representative with [employer] who informed him it would take 7-10 business days to get his statements.  CM explained to patient that he could set up a [employer] Account online, and access his bank statements via his account.  CM urged patient to contact a family member to bring his card prior to [place] meeting with him this afternoon.  Patient verbalized understanding.

## 2025-02-24 NOTE — NURSING
[facility] [place]  Nurses Note -- 4 Eyes      2/23/2025      Skin assessed on: Q Shift      [] No Pressure Injuries Present    [x]Prevention Measures Documented    [x] Yes LDA  for Pressure Injury Previously documented     [] Yes New Pressure Injury Discovered   [] LDA for New Pressure Injury Added      Attending RN:  Cecil Durand RN     Second RN:  DARRION Baker

## 2025-02-24 NOTE — PT/OT/SLP PROGRESS
Physical Therapy      Patient Name:  Miguel Moore   MRN:  [id number]    Patient not seen today secondary to Dialysis. Will follow-up as able later hour/day .Second attempt , pt found eating dinner , patient requested to eat and stated he will be ready to participate in the morning.

## 2025-02-24 NOTE — PHYSICIAN QUERY
Please clarify if there is any clinical correlation between neck fracture  and  osteopenic. Are the conditions:   Due to or associated with each other

## 2025-02-24 NOTE — PLAN OF CARE
Changes in medical condition or discharge plan: No    Does patient need new DME? No    Follow up appts needed: Nephrology, Orthopedics, and PCP    Medically stable: Yes    Estimated Discharge Date: TBD    Per attending, patient medically stable for discharge.  Patient to discharge to SNF.  He completed paperwork with facility admissions coordinator at bedside, but does not have his bank statements still.  Per Kelsey with facility, patient's fiance was bedside and is assisting patient with getting statements.  CM to continue to assess for and until discharge.    02/24/25 1523   Discharge Reassessment   Assessment Type Discharge Planning Reassessment   Did the patient's condition or plan change since previous assessment? No   Discharge Plan discussed with: Patient   Communicated GARY with patient/caregiver Date not available/Unable to determine   Discharge Plan A Skilled Nursing Facility   Discharge Plan B Skilled Nursing Facility   DME Needed Upon Discharge  none   Transition of Care Barriers None   Why the patient remains in the hospital Placement issues   Post-Acute Status   Post-Acute Authorization Placement   Post-Acute Placement Status Pending payor review/awaiting authorization (if required)   Coverage [employer] MANAGED MEDICARE - [employer] MEDICARE [facility] PARTNER - CAPITATED   Hospital Resources/Appts/Education Provided Provided patient/caregiver with written discharge plan information   Discharge Delays None known at this time

## 2025-02-24 NOTE — PLAN OF CARE
Pt alert able to make needs known, tolerates medications well by mouth, no signs or symptoms of adverse reactions noted, pain controlled by PRN pain medication, plan of care explained, diet tolerated, pt denies n,v,d this shift, remains free from falls and hospital acquired pressure injuries, safety maintained. Will continue following plan of care.      Problem: Adult Inpatient Plan of Care  Goal: Plan of Care Review  Outcome: Progressing  Goal: Patient-Specific Goal (Individualized)  Outcome: Progressing  Goal: Absence of Hospital-Acquired Illness or Injury  Outcome: Progressing  Goal: Optimal Comfort and Wellbeing  Outcome: Progressing  Goal: Readiness for Transition of Care  Outcome: Progressing     Problem: Diabetes Comorbidity  Goal: Blood Glucose Level Within Targeted Range  Outcome: Progressing     Problem: Wound  Goal: Optimal Coping  Outcome: Progressing  Goal: Optimal Functional Ability  Outcome: Progressing  Goal: Absence of Infection Signs and Symptoms  Outcome: Progressing  Goal: Improved Oral Intake  Outcome: Progressing  Goal: Optimal Pain Control and Function  Outcome: Progressing  Goal: Skin Health and Integrity  Outcome: Progressing  Goal: Optimal Wound Healing  Outcome: Progressing     Problem: Fall Injury Risk  Goal: Absence of Fall and Fall-Related Injury  Outcome: Progressing     Problem: Skin Injury Risk Increased  Goal: Skin Health and Integrity  Outcome: Progressing     Problem: Hemodialysis  Goal: Safe, Effective Therapy Delivery  Outcome: Progressing  Goal: Effective Tissue Perfusion  Outcome: Progressing  Goal: Absence of Infection Signs and Symptoms  Outcome: Progressing     Problem: Hip Arthroplasty  Goal: Optimal Coping  Outcome: Progressing  Goal: Absence of Bleeding  Outcome: Progressing  Goal: Effective Bowel Elimination  Outcome: Progressing  Goal: Fluid and Electrolyte Balance  Outcome: Progressing  Goal: Optimal Functional Ability  Outcome: Progressing  Goal: Absence of Infection  Signs and Symptoms  Outcome: Progressing  Goal: Intact Neurovascular Status  Outcome: Progressing  Goal: Anesthesia/Sedation Recovery  Outcome: Progressing  Goal: Acceptable Pain Control  Outcome: Progressing  Goal: Nausea and Vomiting Relief  Outcome: Progressing  Goal: Effective Urinary Elimination  Outcome: Progressing  Goal: Effective Oxygenation and Ventilation  Outcome: Progressing

## 2025-02-25 LAB
POCT GLUCOSE: 126 MG/DL (ref 70–110)
POCT GLUCOSE: 169 MG/DL (ref 70–110)

## 2025-02-25 PROCEDURE — 25000003 PHARM REV CODE 250: Mod: HCNC | Performed by: STUDENT IN AN ORGANIZED HEALTH CARE EDUCATION/TRAINING PROGRAM

## 2025-02-25 PROCEDURE — 25000003 PHARM REV CODE 250: Mod: HCNC | Performed by: INTERNAL MEDICINE

## 2025-02-25 PROCEDURE — 97116 GAIT TRAINING THERAPY: CPT | Mod: HCNC,CQ

## 2025-02-25 PROCEDURE — 99232 SBSQ HOSP IP/OBS MODERATE 35: CPT | Mod: HCNC,,, | Performed by: INTERNAL MEDICINE

## 2025-02-25 PROCEDURE — 97530 THERAPEUTIC ACTIVITIES: CPT | Mod: HCNC,CQ

## 2025-02-25 PROCEDURE — 97535 SELF CARE MNGMENT TRAINING: CPT | Mod: HCNC

## 2025-02-25 PROCEDURE — 25000003 PHARM REV CODE 250: Mod: HCNC | Performed by: HOSPITALIST

## 2025-02-25 PROCEDURE — 25000003 PHARM REV CODE 250: Mod: HCNC | Performed by: ORTHOPAEDIC SURGERY

## 2025-02-25 PROCEDURE — 94761 N-INVAS EAR/PLS OXIMETRY MLT: CPT | Mod: HCNC

## 2025-02-25 PROCEDURE — 21400001 HC TELEMETRY ROOM: Mod: HCNC

## 2025-02-25 PROCEDURE — 97530 THERAPEUTIC ACTIVITIES: CPT | Mod: HCNC

## 2025-02-25 RX ORDER — INSULIN ASPART 100 [IU]/ML
0-5 INJECTION, SOLUTION INTRAVENOUS; SUBCUTANEOUS
Status: DISCONTINUED | OUTPATIENT
Start: 2025-02-25 | End: 2025-02-27 | Stop reason: HOSPADM

## 2025-02-25 RX ORDER — IBUPROFEN 200 MG
16 TABLET ORAL
Status: DISCONTINUED | OUTPATIENT
Start: 2025-02-25 | End: 2025-02-27 | Stop reason: HOSPADM

## 2025-02-25 RX ORDER — GLUCAGON 1 MG
1 KIT INJECTION
Status: DISCONTINUED | OUTPATIENT
Start: 2025-02-25 | End: 2025-02-27 | Stop reason: HOSPADM

## 2025-02-25 RX ORDER — IBUPROFEN 200 MG
24 TABLET ORAL
Status: DISCONTINUED | OUTPATIENT
Start: 2025-02-25 | End: 2025-02-27 | Stop reason: HOSPADM

## 2025-02-25 RX ADMIN — PANTOPRAZOLE SODIUM 40 MG: 40 TABLET, DELAYED RELEASE ORAL at 09:02

## 2025-02-25 RX ADMIN — OXYCODONE 10 MG: 5 TABLET ORAL at 07:02

## 2025-02-25 RX ADMIN — EZETIMIBE 10 MG: 10 TABLET ORAL at 08:02

## 2025-02-25 RX ADMIN — SEVELAMER CARBONATE 800 MG: 800 TABLET, FILM COATED ORAL at 05:02

## 2025-02-25 RX ADMIN — SEVELAMER CARBONATE 800 MG: 800 TABLET, FILM COATED ORAL at 08:02

## 2025-02-25 RX ADMIN — TAMSULOSIN HYDROCHLORIDE 0.8 MG: 0.4 CAPSULE ORAL at 08:02

## 2025-02-25 RX ADMIN — APIXABAN 5 MG: 5 TABLET, FILM COATED ORAL at 08:02

## 2025-02-25 RX ADMIN — APIXABAN 5 MG: 5 TABLET, FILM COATED ORAL at 09:02

## 2025-02-25 RX ADMIN — PANTOPRAZOLE SODIUM 40 MG: 40 TABLET, DELAYED RELEASE ORAL at 08:02

## 2025-02-25 RX ADMIN — Medication 1 CAPSULE: at 08:02

## 2025-02-25 RX ADMIN — ATORVASTATIN CALCIUM 80 MG: 40 TABLET, FILM COATED ORAL at 08:02

## 2025-02-25 RX ADMIN — HYDROCODONE BITARTRATE AND ACETAMINOPHEN 1 TABLET: 5; 325 TABLET ORAL at 03:02

## 2025-02-25 RX ADMIN — SEVELAMER CARBONATE 800 MG: 800 TABLET, FILM COATED ORAL at 12:02

## 2025-02-25 RX ADMIN — ASPIRIN 81 MG CHEWABLE TABLET 81 MG: 81 TABLET CHEWABLE at 08:02

## 2025-02-25 RX ADMIN — DOCUSATE SODIUM 100 MG: 100 CAPSULE, LIQUID FILLED ORAL at 08:02

## 2025-02-25 RX ADMIN — DOCUSATE SODIUM 100 MG: 100 CAPSULE, LIQUID FILLED ORAL at 09:02

## 2025-02-25 RX ADMIN — POLYETHYLENE GLYCOL 3350 17 G: 17 POWDER, FOR SOLUTION ORAL at 09:02

## 2025-02-25 RX ADMIN — FINASTERIDE 5 MG: 5 TABLET, FILM COATED ORAL at 08:02

## 2025-02-25 NOTE — NURSING
[facility] [place]  Nurses Note -- 4 Eyes      2/24/2025      Skin assessed on: Q Shift      [] No Pressure Injuries Present    [x]Prevention Measures Documented    [x] Yes LDA  for Pressure Injury Previously documented     [] Yes New Pressure Injury Discovered   [] LDA for New Pressure Injury Added      Attending RN:  Cecil Durand RN     Second RN:  DARRION Baker

## 2025-02-25 NOTE — SUBJECTIVE & OBJECTIVE
Interval History: Patient HDS and afebile. No acute events overnight. No new complaints this morning. Pt is medically stable for DC, pending placement. Undergoing HD today.       Review of Systems   Constitutional:  Positive for activity change and fatigue. Negative for fever.   Respiratory:  Negative for cough and shortness of breath.    Cardiovascular:  Negative for chest pain.   Gastrointestinal:  Positive for constipation. Negative for abdominal pain.   Neurological:  Negative for dizziness and headaches.   Psychiatric/Behavioral:  Negative for confusion.    All other systems reviewed and are negative.    Objective:     Vital Signs (Most Recent):  Temp: 98.5 °F (36.9 °C) (02/24/25 2317)  Pulse: 83 (02/24/25 2317)  Resp: 18 (02/24/25 2317)  BP: 110/68 (02/24/25 2317)  SpO2: 96 % (02/24/25 2317) Vital Signs (24h Range):  Temp:  [98 °F (36.7 °C)-99.1 °F (37.3 °C)] 98.5 °F (36.9 °C)  Pulse:  [] 83  Resp:  [16-20] 18  SpO2:  [96 %-100 %] 96 %  BP: (105-147)/(61-79) 110/68     Weight: 72.9 kg (160 lb 11.5 oz)  Body mass index is 24.44 kg/m².    Intake/Output Summary (Last 24 hours) at 2/25/2025 0011  Last data filed at 2/24/2025 1700  Gross per 24 hour   Intake 600 ml   Output 1800 ml   Net -1200 ml         Physical Exam  Vitals and nursing note reviewed.   Constitutional:       Appearance: Normal appearance.   HENT:      Head: Normocephalic and atraumatic.   Cardiovascular:      Rate and Rhythm: Normal rate and regular rhythm.   Pulmonary:      Effort: Pulmonary effort is normal. No respiratory distress.   Abdominal:      General: There is no distension.   Musculoskeletal:         General: Normal range of motion.   Neurological:      Mental Status: He is alert and oriented to person, place, and time.   Psychiatric:         Mood and Affect: Mood normal.         Behavior: Behavior normal.             Significant Labs: All pertinent labs within the past 24 hours have been reviewed.  CBC:   Recent Labs   Lab  02/24/25  0715   WBC 4.25   HGB 7.5*   HCT 22.8*          CMP:   Recent Labs   Lab 02/24/25  0715   *   K 4.6   CL 99   CO2 21*   *   BUN 48*   CREATININE 10.7*   CALCIUM 8.4*   ANIONGAP 13       Significant Imaging: I have reviewed all pertinent imaging results/findings within the past 24 hours.

## 2025-02-25 NOTE — PLAN OF CARE
Here for biopsy of previously noted growth    shanta vs SCC, left lower leg  Thin pink dry papule  Differential diagnosis discussed.   She opts for biopsy today rather than clinical follow up.  We also discussed doing empiric destruction of growth today along with biopsy vs waiting for biopsy results with treatment at that point if needed.   Discussed if persistent or recurrent, further tx will be recommended, including possible excisional surgery or other method of treatment. She states understanding and wanted to proceed.  Pathology showed basal cell carcinoma.   We discussed the risk of permanent scarring, infection, permanent light or dark discoloration, and the possibility of recurrence.  She stated understanding and gave verbal consent.  Prep:  Hibiclens  Anesthesia:  1% lidocaine with epinephrine.  Procedure:  1 tangential biopsy performed with Dermablade.  Destruction with C&F x 3.   Lesion size:  1.7  Hemostasis:  aluminum chloride and cautery.   Dressing:  Polysporin ointment and bandage applied.   Given routine oral and written postoperative wound care instructions.  Call if any problems with healing, excess redness, pain, swelling, and/or drainage.   Advised to call if results are not received by 2 weeks.       RTC 1 year skin exam, sooner prn.       She  stated understanding of above, had no questions.           Julia JOVEL, LPN attest that I performed the duties of a scribe for services personally performed by Dr Lee, Dr. Manzano attest that I saw and examined the patient and agree with the above documentation as scribed.        Pt alert able to make needs known, tolerates medications well by mouth, no signs or symptoms of adverse reactions noted, pain controlled by PRN pain medication, plan of care explained, diet tolerated, pt denies n,v,d, wound care completed this shift, remains free from falls and hospital acquired pressure injuries, safety maintained. Will continue following plan of care.      Problem: Adult Inpatient Plan of Care  Goal: Plan of Care Review  Outcome: Progressing  Goal: Patient-Specific Goal (Individualized)  Outcome: Progressing  Goal: Absence of Hospital-Acquired Illness or Injury  Outcome: Progressing  Goal: Optimal Comfort and Wellbeing  Outcome: Progressing  Goal: Readiness for Transition of Care  Outcome: Progressing     Problem: Diabetes Comorbidity  Goal: Blood Glucose Level Within Targeted Range  Outcome: Progressing     Problem: Wound  Goal: Optimal Coping  Outcome: Progressing  Goal: Optimal Functional Ability  Outcome: Progressing  Goal: Absence of Infection Signs and Symptoms  Outcome: Progressing  Goal: Improved Oral Intake  Outcome: Progressing  Goal: Optimal Pain Control and Function  Outcome: Progressing  Goal: Skin Health and Integrity  Outcome: Progressing  Goal: Optimal Wound Healing  Outcome: Progressing     Problem: Fall Injury Risk  Goal: Absence of Fall and Fall-Related Injury  Outcome: Progressing     Problem: Skin Injury Risk Increased  Goal: Skin Health and Integrity  Outcome: Progressing     Problem: Hemodialysis  Goal: Safe, Effective Therapy Delivery  Outcome: Progressing  Goal: Effective Tissue Perfusion  Outcome: Progressing  Goal: Absence of Infection Signs and Symptoms  Outcome: Progressing     Problem: Hip Arthroplasty  Goal: Optimal Coping  Outcome: Progressing  Goal: Absence of Bleeding  Outcome: Progressing  Goal: Effective Bowel Elimination  Outcome: Progressing  Goal: Fluid and Electrolyte Balance  Outcome: Progressing  Goal: Optimal Functional Ability  Outcome:  Progressing  Goal: Absence of Infection Signs and Symptoms  Outcome: Progressing  Goal: Intact Neurovascular Status  Outcome: Progressing  Goal: Anesthesia/Sedation Recovery  Outcome: Progressing  Goal: Acceptable Pain Control  Outcome: Progressing  Goal: Nausea and Vomiting Relief  Outcome: Progressing  Goal: Effective Urinary Elimination  Outcome: Progressing  Goal: Effective Oxygenation and Ventilation  Outcome: Progressing

## 2025-02-25 NOTE — PROGRESS NOTES
[facility]  Telemedicine Progress Note    Patient Name: Miguel Moore  MRN: [id number]  Patient Class: IP- Inpatient   Admission Date: 2/15/2025  Length of Stay: 10 days  Attending Physician: Rosalva Manning MD  Primary Care Provider: Raciel Raymond MD          Subjective:     Principal Problem:Closed fracture of right hip        HPI:  Is a 69-year-old male with history of CVA, hypertension, ESRD on HD, DVT on Eliquis who presents to the emergency department for evaluation after a fall.  Patient reports he gets around with a walker and he slipped and fell to his right hip.  Denies loss of consciousness, hitting of his head or other recent issues.  In the ED, Xray of right hip positive for acute right femoral neck fracture. CT confirmed. Orthopedic Surgery consulted. Plan for OR on 2/17/25. He will be admitted to hospital medicine for further management.     Overview/Hospital Course:  Mr. Moore is a 69-year-old male who was admitted with a right femoral neck fracture.  Orthopedic surgery consulted.S/P right  hip replacement arthroplasty on 02/17/2025.  Nephrology consulted for dialysis.  Wound care is doing wound care on right plantar foot and left heel.  PT,OT ,plan for SNF.    Interval History: Patient HDS and afebile. No acute events overnight. No new complaints this morning. Pt is medically stable for DC, pending placement. Undergoing HD today.       Review of Systems   Constitutional:  Positive for activity change and fatigue. Negative for fever.   Respiratory:  Negative for cough and shortness of breath.    Cardiovascular:  Negative for chest pain.   Gastrointestinal:  Positive for constipation. Negative for abdominal pain.   Neurological:  Negative for dizziness and headaches.   Psychiatric/Behavioral:  Negative for confusion.    All other systems reviewed and are negative.    Objective:     Vital Signs (Most Recent):  Temp: 98.5 °F (36.9 °C) (02/24/25 2317)  Pulse: 83  (02/24/25 2317)  Resp: 18 (02/24/25 2317)  BP: 110/68 (02/24/25 2317)  SpO2: 96 % (02/24/25 2317) Vital Signs (24h Range):  Temp:  [98 °F (36.7 °C)-99.1 °F (37.3 °C)] 98.5 °F (36.9 °C)  Pulse:  [] 83  Resp:  [16-20] 18  SpO2:  [96 %-100 %] 96 %  BP: (105-147)/(61-79) 110/68     Weight: 72.9 kg (160 lb 11.5 oz)  Body mass index is 24.44 kg/m².    Intake/Output Summary (Last 24 hours) at 2/25/2025 0011  Last data filed at 2/24/2025 1700  Gross per 24 hour   Intake 600 ml   Output 1800 ml   Net -1200 ml         Physical Exam  Vitals and nursing note reviewed.   Constitutional:       Appearance: Normal appearance.   HENT:      Head: Normocephalic and atraumatic.   Cardiovascular:      Rate and Rhythm: Normal rate and regular rhythm.   Pulmonary:      Effort: Pulmonary effort is normal. No respiratory distress.   Abdominal:      General: There is no distension.   Musculoskeletal:         General: Normal range of motion.   Neurological:      Mental Status: He is alert and oriented to person, place, and time.   Psychiatric:         Mood and Affect: Mood normal.         Behavior: Behavior normal.             Significant Labs: All pertinent labs within the past 24 hours have been reviewed.  CBC:   Recent Labs   Lab 02/24/25  0715   WBC 4.25   HGB 7.5*   HCT 22.8*          CMP:   Recent Labs   Lab 02/24/25  0715   *   K 4.6   CL 99   CO2 21*   *   BUN 48*   CREATININE 10.7*   CALCIUM 8.4*   ANIONGAP 13       Significant Imaging: I have reviewed all pertinent imaging results/findings within the past 24 hours.    Assessment/Plan:      * Closed fracture of right hip  Patient presents with a fall, subsequent right femoral neck fracture.  Orthopedic surgery consulted, plan for hip replacement on 02/17/2025.  Continue with supportive care and pain control.    - s/p hip replacement on 2/17/25  - PT/OT  - pain control  - pending SNF placement     Slow transit constipation  -Bowel reg  "ordered  -resolved    Wounds, multiple  Wound care at home for bilateral lower extremities.  Mainly right lower extremity, wound care comes Tuesdays and Thursdays.  We will consult Wound Care for further management.    Wound care is doing wound care on right plantar foot and left heel.    Paroxysmal atrial fibrillation  Patient has paroxysmal (<7 days) atrial fibrillation. Patient is currently in sinus rhythm. [id number] Score: 3. The patients heart rate in the last 24 hours is as follows:  Pulse  Min: 74  Max: 95     Antiarrhythmics       Anticoagulants  apixaban tablet 5 mg, 2 times daily, Oral    Plan  - Replete lytes with a goal of K>4, Mg >2  - Patient is anticoagulated, see medications listed above.  - Patient's afib is currently controlled          Debility  -Patient noted to be medically stable for discharge at this time  -Has been able to work with OT/PT who recommend placement for further rehabilitation  -Patient pending placement, continue in-hospital care as stated above in the meantime  -More than 20 minutes of my time has been spent discussing and planning just her safe disposition with patient/family/CM/SW/Nursing staff (not including other time spent in clinical discussion and management)  -CM/SW following, appreciate input   -Will place DC orders once placement has been secured        Chronic deep vein thrombosis (DVT) of popliteal vein of right lower extremity 9/21/20 outside [place]; unprovoked; anticoagulation  Patient on Eliquis at home.  Transitioned to heparin prior to surgery.  - resume eliquis       DM type 2 without retinopathy  Patient's FSGs are controlled on current medication regimen.  Last A1c reviewed-   Lab Results   Component Value Date    HGBA1C 5.8 12/09/2024     Most recent fingerstick glucose reviewed- No results for input(s): "POCTGLUCOSE" in the last 24 hours.  Current correctional scale   none    ESRD (end stage renal disease)  Creatine stable for now. BMP reviewed- noted " Estimated Creatinine Clearance: 9.9 mL/min (A) (based on SCr of 6.7 mg/dL (H)). according to latest data. Based on current GFR, CKD stage is end stage.  Monitor UOP and serial BMP and adjust therapy as needed. Renally dose meds. Avoid nephrotoxic medications and procedures.    Dialysis Monday Wednesday Friday.  Has left arm fistula.  Nephrology consulted.  Continue HD    Hemiplegia and hemiparesis following cerebral infarction affecting right dominant side  Noted      BPH (benign prostatic hyperplasia) 2/18/21 prostate bx normal  Resume home medications        VTE Risk Mitigation (From admission, onward)           Ordered     Place JJ hose  Until discontinued         02/17/25 2224     apixaban tablet 5 mg  2 times daily         02/17/25 1610     IP VTE HIGH RISK PATIENT  Once         02/15/25 1525     Place sequential compression device  Until discontinued         02/15/25 1525                          I have completed this tele-visit without the assistance of a telepresenter.    The attending portion of this evaluation, treatment, and documentation was performed per Rosalva Manning MD via Telemedicine AudioVisual using the secure XOG software platform with 2 way audio/video. The provider was located off-site and the patient is located in the hospital. The aforementioned video software was utilized to document the relevant history and physical exam    Rosalva Manning MD  Department of Hospital Medicine   [facility]

## 2025-02-25 NOTE — PROGRESS NOTES
[facility] [place] - Med Surg  Adult Nutrition  Progress Note    SUMMARY       Recommendations    Recommendation:   1. Add diabetic restirictions to current renal on dialysis diet.   2. Add Novasource Renal bid.   3. Add Darryn bid.   4. Monitor weight/labs.   5. RD to follow to monitor PO intake.    Goals:  Pt to meet 50-75% EEN/EPN by RD follow up.  Nutrition Goal Status: new  Communication of RD Recs:  (poc)    Nutrition Discharge Planning  Nutrition Discharge Planning: Therapeutic diet (comments), Oral supplement regimen (comments)  Therapeutic diet (comments): Diabetic, renal on dialysis  Oral supplement regimen (comments): Novasource Renal    Assessment and Plan  Nutrition Problem  Inadequate protein intake    Related to (etiology):   Increased demand for wound healing    Signs and Symptoms (as evidenced by):   Left heel pressure injury  Right greater trochanter vertical incision  Medial buttocks pressure injury  Right plantar foot pressure injury     Interventions/Recommendations (treatment strategy):  Commercial beverage medical food supplement therapy - Novasource Renal bid  Commercial food medical food supplement therapy - Darryn bid    Nutrition Diagnosis Status:   New    Malnutrition Assessment  Weight Loss (Malnutrition):  (9.44% x 6 months)   Upper Arm Region (Subcutaneous Fat Loss): well nourished   Taoism Region (Muscle Loss): mild depletion  Clavicle Bone Region (Muscle Loss): mild depletion     Reason for Assessment  Reason For Assessment: length of stay  Diagnosis:  (closed fracture of right hip)  General Information Comments: Pt presented to ED due to fall at home. S/p right primary total hip arthroplasty 2/17. PMH: type II DM, debility. Pt dx with closed fracture of right hip. Aiden 16- left heel pressure injury, right greater trochanter vertical incision, medial buttocks pressure injury, right plantar foot pressure injury. Pt currently on renal on dialysis diet. Noted 50% intake at lunch. Pt reports  "drinking oral supplements at home. Noted 17 lb wt loss x 6 months. NFPE conducted  - mild wasting. Pt is medically clear for d/c, pending SNF placement.    Nutrition/Diet History  Nutrition Intake History: 50%  Food Preferences: No Advent or cultural food preferences  Spiritual, Cultural Beliefs, Zoroastrianism Practices, Values that Affect Care: no  Food Allergies: NKFA  Factors Affecting Nutritional Intake: decreased appetite    Past Medical History:   Diagnosis Date    Allergy     Clotting disorder     Eliquis and Plavix    Deep vein thrombosis     Diabetes mellitus, type 2     Hypertension     Nuclear sclerosis of both eyes 2017    Renal disorder     Seizures     Stroke     Urinary tract infection      Anthropometrics  Height: 5' 8" (172.7 cm)  Height (inches): 68 in  Height Method: Stated  Weight: 72.9 kg (160 lb 11.5 oz)  Weight (lb): 160.72 lb  Weight Method: Bed Scale  Ideal Body Weight (IBW), Male: 154 lb  % Ideal Body Weight, Male (lb): 104.36 %  BMI (Calculated): 24.4  BMI Grade: 18.5-24.9 - normal  Usual Body Weight (UBW), k.5 kg ()  Weight Change Amount: 17 lb (x 6 months)  % Usual Body Weight: 90.75  % Weight Change From Usual Weight: -9.44 %    Lab/Procedures/Meds  Pertinent Labs Reviewed: reviewed  Pertinent Labs Comments: Na 133L, BUN 48H, Crea 10.7H, eGFR 5L, Glu 113H, Ca 8.4L  Pertinent Medications Reviewed: reviewed  Pertinent Medications Comments: asprin, docusate sodium, epotin marisol-apbx, pantoprazole, polyethylene glycol, renal multivitamin    Estimated/Assessed Needs  Weight Used For Calorie Calculations: 72.9 kg (160 lb 11.5 oz)  Energy Calorie Requirements (kcal): 2187 (30 kcal/kg)  Energy Need Method: Kcal/kg  Protein Requirements: 87 - 102 g (1.2 - 1.4 g/kg)  Weight Used For Protein Calculations: 72.9 kg (160 lb 11.5 oz)  Estimated Fluid Requirement Method: RDA Method  RDA Method (mL): 2187  CHO Requirement: 246 g    Nutrition Prescription Ordered  Current Diet Order: " renal on dialysis    Evaluation of Received Nutrient/Fluid Intake  I/O: 600/1800  Energy Calories Required: not meeting needs  Protein Required: not meeting needs  Fluid Required: not meeting needs  Comments: LBM 2/24  % Intake of Estimated Energy Needs: 25 - 50 %  % Meal Intake: 25 - 50 %    Nutrition Risk  Level of Risk/Frequency of Follow-up:  (1 x weekly)     Monitor and Evaluation  Monitor and Evaluation: Energy intake, Food and beverage intake, Diet order, Food and nutrition knowledge, Weight, Beliefs and attitudes, Electrolyte and renal panel, Gastrointestinal profile, Glucose/endocrine profile, Inflammatory profile, Lipid profile, Nutrition focused physical findings     Nutrition Follow-Up  RD Follow-up?: Yes

## 2025-02-25 NOTE — SUBJECTIVE & OBJECTIVE
Interval History: s/p HD yesterday with 1.3L removed. No events overnight. No labs today. Doing okay this morning. Remains with hip pain. Discharge pending rehab placement.       Review of patient's allergies indicates:   Allergen Reactions    Ace inhibitors      Hyperkalemia 8/2018  Other reaction(s): Unknown    Penicillins Hives     Tolerated cefepime and cefdinir previously    Tizanidine      Felt hot     Current Facility-Administered Medications   Medication Frequency    acetaminophen tablet 650 mg Q4H PRN    apixaban tablet 5 mg BID    aspirin chewable tablet 81 mg Daily    atorvastatin tablet 80 mg Daily    dextrose 50% injection 12.5 g PRN    dextrose 50% injection 12.5 g PRN    dextrose 50% injection 25 g PRN    dextrose 50% injection 25 g PRN    docusate sodium capsule 100 mg BID    [START ON 2/26/2025] epoetin marisol-epbx injection 10,000 Units Every Mon, Wed, Fri    ezetimibe tablet 10 mg Daily    finasteride tablet 5 mg Daily    glucagon (human recombinant) injection 1 mg PRN    glucagon (human recombinant) injection 1 mg PRN    glucose chewable tablet 16 g PRN    glucose chewable tablet 16 g PRN    glucose chewable tablet 24 g PRN    glucose chewable tablet 24 g PRN    HYDROcodone-acetaminophen 5-325 mg per tablet 1 tablet Q6H PRN    insulin aspart U-100 pen 0-5 Units QID (AC + HS) PRN    naloxone 0.4 mg/mL injection 0.02 mg PRN    ondansetron injection 4 mg Q6H PRN    oxyCODONE immediate release tablet 10 mg Q6H PRN    pantoprazole EC tablet 40 mg BID    polyethylene glycol packet 17 g BID    sevelamer carbonate tablet 800 mg TID WM    sodium chloride 0.9% flush 10 mL Q12H PRN    sodium chloride 0.9% flush 2 mL PRN    tamsulosin 24 hr capsule 0.8 mg Daily    vitamin renal formula (B-complex-vitamin c-folic acid) 1 mg per capsule 1 capsule Daily       Objective:     Vital Signs (Most Recent):  Temp: 98.3 °F (36.8 °C) (02/25/25 1142)  Pulse: 75 (02/25/25 1448)  Resp: (!) 21 (02/25/25 1142)  BP: 126/75  (02/25/25 1142)  SpO2: 100 % (02/25/25 1142) Vital Signs (24h Range):  Temp:  [98.3 °F (36.8 °C)-99.1 °F (37.3 °C)] 98.3 °F (36.8 °C)  Pulse:  [] 75  Resp:  [16-21] 21  SpO2:  [94 %-100 %] 100 %  BP: (105-126)/(68-75) 126/75     Weight: 72.9 kg (160 lb 11.5 oz) (02/25/25 0700)  Body mass index is 24.44 kg/m².  Body surface area is 1.87 meters squared.    I/O last 3 completed shifts:  In: 600 [P.O.:600]  Out: 1800 [Other:1800]     Physical Exam  Vitals and nursing note reviewed.   Constitutional:       General: He is awake. He is not in acute distress.     Appearance: Normal appearance. He is well-developed.   HENT:      Head: Normocephalic and atraumatic.      Nose: Nose normal.      Mouth/Throat:      Mouth: Mucous membranes are moist.   Eyes:      Extraocular Movements: Extraocular movements intact.      Conjunctiva/sclera: Conjunctivae normal.   Cardiovascular:      Rate and Rhythm: Normal rate and regular rhythm.   Pulmonary:      Effort: Pulmonary effort is normal.      Breath sounds: Normal breath sounds.   Abdominal:      General: There is no distension.      Palpations: Abdomen is soft.   Skin:     General: Skin is warm and dry.      Findings: No erythema or rash.   Neurological:      General: No focal deficit present.      Mental Status: He is alert. Mental status is at baseline.   Psychiatric:         Mood and Affect: Mood normal.         Behavior: Behavior normal.          Significant Labs:  CBC:   Recent Labs   Lab 02/24/25  0715   WBC 4.25   RBC 2.75*   HGB 7.5*   HCT 22.8*      MCV 83   MCH 27.3   MCHC 32.9     CMP:   Recent Labs   Lab 02/24/25  0715   *   CALCIUM 8.4*   *   K 4.6   CO2 21*   CL 99   BUN 48*   CREATININE 10.7*     All labs within the past 24 hours have been reviewed.     Significant Imaging:  Labs: Reviewed

## 2025-02-25 NOTE — PLAN OF CARE
Recommendation:   1. Add diabetic restirictions to current renal on dialysis diet.   2. Add Novasource Renal bid.   3. Add Darryn bid.   4. Monitor weight/labs.   5. RD to follow to monitor PO intake.    Goals:  Pt to meet 50-75% EEN/EPN by RD follow up.  Nutrition Goal Status: new

## 2025-02-25 NOTE — PROGRESS NOTES
[facility] Surg  Nephrology  Progress Note    Patient Name: Miguel Moore  MRN: [id number]  Admission Date: 2/15/2025  Hospital Length of Stay: 10 days  Attending Provider: Rosalva Manning MD   Primary Care Physician: Raciel Raymond MD  Principal Problem:Closed fracture of right hip    Subjective:     HPI: This is a 69 y.o. M with a PMHx of MI, seizures, T2DM, HTN, DVT , ESRD on HD presented to ED 2/15/24 via EMS with traumatic right hip pain s/p fall. Right hip xray + femoral neck fracture. Pt admitted for surgical management. Nephrology consulted for hemodialysis. Plan for right Primary Total Hip Arthroplasty 2/16/25    Prior records obtained and reviewed.  last HD session 2/14/25  Attending Nephrologist: CLEMENTE SHAHID MD  Dialysis Location: [facility]  Schedule: M-W-F   Duration 3.5hrs  EDW 70.2kg  LUE AVF    Interval History: s/p HD yesterday with 1.3L removed. No events overnight. No labs today. Doing okay this morning. Remains with hip pain. Discharge pending rehab placement.       Review of patient's allergies indicates:   Allergen Reactions    Ace inhibitors      Hyperkalemia 8/2018  Other reaction(s): Unknown    Penicillins Hives     Tolerated cefepime and cefdinir previously    Tizanidine      Felt hot     Current Facility-Administered Medications   Medication Frequency    acetaminophen tablet 650 mg Q4H PRN    apixaban tablet 5 mg BID    aspirin chewable tablet 81 mg Daily    atorvastatin tablet 80 mg Daily    dextrose 50% injection 12.5 g PRN    dextrose 50% injection 12.5 g PRN    dextrose 50% injection 25 g PRN    dextrose 50% injection 25 g PRN    docusate sodium capsule 100 mg BID    [START ON 2/26/2025] epoetin marisol-epbx injection 10,000 Units Every Mon, Wed, Fri    ezetimibe tablet 10 mg Daily    finasteride tablet 5 mg Daily    glucagon (human recombinant) injection 1 mg PRN    glucagon (human recombinant) injection 1 mg PRN    glucose chewable tablet 16 g PRN    glucose  chewable tablet 16 g PRN    glucose chewable tablet 24 g PRN    glucose chewable tablet 24 g PRN    HYDROcodone-acetaminophen 5-325 mg per tablet 1 tablet Q6H PRN    insulin aspart U-100 pen 0-5 Units QID (AC + HS) PRN    naloxone 0.4 mg/mL injection 0.02 mg PRN    ondansetron injection 4 mg Q6H PRN    oxyCODONE immediate release tablet 10 mg Q6H PRN    pantoprazole EC tablet 40 mg BID    polyethylene glycol packet 17 g BID    sevelamer carbonate tablet 800 mg TID WM    sodium chloride 0.9% flush 10 mL Q12H PRN    sodium chloride 0.9% flush 2 mL PRN    tamsulosin 24 hr capsule 0.8 mg Daily    vitamin renal formula (B-complex-vitamin c-folic acid) 1 mg per capsule 1 capsule Daily       Objective:     Vital Signs (Most Recent):  Temp: 98.3 °F (36.8 °C) (02/25/25 1142)  Pulse: 75 (02/25/25 1448)  Resp: (!) 21 (02/25/25 1142)  BP: 126/75 (02/25/25 1142)  SpO2: 100 % (02/25/25 1142) Vital Signs (24h Range):  Temp:  [98.3 °F (36.8 °C)-99.1 °F (37.3 °C)] 98.3 °F (36.8 °C)  Pulse:  [] 75  Resp:  [16-21] 21  SpO2:  [94 %-100 %] 100 %  BP: (105-126)/(68-75) 126/75     Weight: 72.9 kg (160 lb 11.5 oz) (02/25/25 0700)  Body mass index is 24.44 kg/m².  Body surface area is 1.87 meters squared.    I/O last 3 completed shifts:  In: 600 [P.O.:600]  Out: 1800 [Other:1800]     Physical Exam  Vitals and nursing note reviewed.   Constitutional:       General: He is awake. He is not in acute distress.     Appearance: Normal appearance. He is well-developed.   HENT:      Head: Normocephalic and atraumatic.      Nose: Nose normal.      Mouth/Throat:      Mouth: Mucous membranes are moist.   Eyes:      Extraocular Movements: Extraocular movements intact.      Conjunctiva/sclera: Conjunctivae normal.   Cardiovascular:      Rate and Rhythm: Normal rate and regular rhythm.   Pulmonary:      Effort: Pulmonary effort is normal.      Breath sounds: Normal breath sounds.   Abdominal:      General: There is no distension.      Palpations:  Abdomen is soft.   Skin:     General: Skin is warm and dry.      Findings: No erythema or rash.   Neurological:      General: No focal deficit present.      Mental Status: He is alert. Mental status is at baseline.   Psychiatric:         Mood and Affect: Mood normal.         Behavior: Behavior normal.          Significant Labs:  CBC:   Recent Labs   Lab 02/24/25  0715   WBC 4.25   RBC 2.75*   HGB 7.5*   HCT 22.8*      MCV 83   MCH 27.3   MCHC 32.9     CMP:   Recent Labs   Lab 02/24/25  0715   *   CALCIUM 8.4*   *   K 4.6   CO2 21*   CL 99   BUN 48*   CREATININE 10.7*     All labs within the past 24 hours have been reviewed.     Significant Imaging:  Labs: Reviewed    Assessment/Plan:     ESRD  - HD MWF; next HD tomorrow. Labs in AM    Anemia of CKD  - hgb 7.5 yesterday  - labs in AM  - PERRY with HD    Secondary HPTH   - last phos controlled; continue sevelamer.  - labs MWF    Hyponatremia   - last Na 133 today; HD as above.      Thank you for your consult. I will follow-up with patient. Please contact us if you have any additional questions.    Jessica Hagen MD  Nephrology  [facility] [place] - Med Surg

## 2025-02-25 NOTE — PROGRESS NOTES
[facility] Medicine  Telemedicine Progress Note    Patient Name: Miguel Moore  MRN: [id number]  Patient Class: IP- Inpatient   Admission Date: 2/15/2025  Length of Stay: 10 days  Attending Physician: Rosalva Manning MD  Primary Care Provider: Raciel Raymond MD          Subjective:     Principal Problem:Closed fracture of right hip        HPI:  Is a 69-year-old male with history of CVA, hypertension, ESRD on HD, DVT on Eliquis who presents to the emergency department for evaluation after a fall.  Patient reports he gets around with a walker and he slipped and fell to his right hip.  Denies loss of consciousness, hitting of his head or other recent issues.  In the ED, Xray of right hip positive for acute right femoral neck fracture. CT confirmed. Orthopedic Surgery consulted. Plan for OR on 2/17/25. He will be admitted to hospital medicine for further management.     Overview/Hospital Course:  Mr. Moore is a 69-year-old male who was admitted with a right femoral neck fracture.  Orthopedic surgery consulted.S/P right  hip replacement arthroplasty on 02/17/2025.  Nephrology consulted for dialysis.  Wound care is doing wound care on right plantar foot and left heel.  PT,OT ,plan for SNF.    Interval History: Patient HDS and afebile. No acute events overnight. No new complaints this morning. Eating and drinking well. Pain is well controlled. Having regular Bms. Pt is medically stable for DC, pending placement.       Review of Systems   Constitutional:  Positive for activity change and fatigue. Negative for fever.   Respiratory:  Negative for cough and shortness of breath.    Cardiovascular:  Negative for chest pain.   Gastrointestinal:  Negative for abdominal pain and constipation.   Neurological:  Negative for dizziness and headaches.   Psychiatric/Behavioral:  Negative for confusion.    All other systems reviewed and are negative.    Objective:     Vital Signs (Most Recent):  Temp: 98.3  °F (36.8 °C) (02/25/25 1142)  Pulse: 74 (02/25/25 1142)  Resp: (!) 21 (02/25/25 1142)  BP: 126/75 (02/25/25 1142)  SpO2: 100 % (02/25/25 1142) Vital Signs (24h Range):  Temp:  [98.3 °F (36.8 °C)-99.1 °F (37.3 °C)] 98.3 °F (36.8 °C)  Pulse:  [] 74  Resp:  [16-21] 21  SpO2:  [94 %-100 %] 100 %  BP: (105-126)/(61-75) 126/75     Weight: 72.9 kg (160 lb 11.5 oz)  Body mass index is 24.44 kg/m².    Intake/Output Summary (Last 24 hours) at 2/25/2025 1207  Last data filed at 2/25/2025 0813  Gross per 24 hour   Intake 600 ml   Output 1800 ml   Net -1200 ml         Physical Exam  Vitals and nursing note reviewed.   Constitutional:       Appearance: Normal appearance.   HENT:      Head: Normocephalic and atraumatic.   Cardiovascular:      Rate and Rhythm: Normal rate and regular rhythm.   Pulmonary:      Effort: Pulmonary effort is normal. No respiratory distress.   Abdominal:      General: There is no distension.   Musculoskeletal:         General: Normal range of motion.   Neurological:      Mental Status: He is alert and oriented to person, place, and time.   Psychiatric:         Mood and Affect: Mood normal.         Behavior: Behavior normal.             Significant Labs: All pertinent labs within the past 24 hours have been reviewed.  CBC:   Recent Labs   Lab 02/24/25  0715   WBC 4.25   HGB 7.5*   HCT 22.8*          CMP:   Recent Labs   Lab 02/24/25  0715   *   K 4.6   CL 99   CO2 21*   *   BUN 48*   CREATININE 10.7*   CALCIUM 8.4*   ANIONGAP 13       Significant Imaging: I have reviewed all pertinent imaging results/findings within the past 24 hours.    Assessment/Plan:      * Closed fracture of right hip  Patient presents with a fall, subsequent right femoral neck fracture.  Orthopedic surgery consulted, plan for hip replacement on 02/17/2025.  Continue with supportive care and pain control.    - s/p hip replacement on 2/17/25  - PT/OT  - pain control  - pending SNF placement     Slow transit  "constipation  -Bowel reg ordered  -resolved    Wounds, multiple  Wound care at home for bilateral lower extremities.  Mainly right lower extremity, wound care comes Tuesdays and Thursdays.  We will consult Wound Care for further management.    Wound care is doing wound care on right plantar foot and left heel.    Paroxysmal atrial fibrillation  Patient has paroxysmal (<7 days) atrial fibrillation. Patient is currently in sinus rhythm. [id number] Score: 3. The patients heart rate in the last 24 hours is as follows:  Pulse  Min: 74  Max: 95     Antiarrhythmics       Anticoagulants  apixaban tablet 5 mg, 2 times daily, Oral    Plan  - Replete lytes with a goal of K>4, Mg >2  - Patient is anticoagulated, see medications listed above.  - Patient's afib is currently controlled          Debility  -Patient noted to be medically stable for discharge at this time  -Has been able to work with OT/PT who recommend placement for further rehabilitation  -Patient pending placement, continue in-hospital care as stated above in the meantime  -More than 20 minutes of my time has been spent discussing and planning just her safe disposition with patient/family/CM/SW/Nursing staff (not including other time spent in clinical discussion and management)  -CM/SW following, appreciate input   -Will place DC orders once placement has been secured        Chronic deep vein thrombosis (DVT) of popliteal vein of right lower extremity 9/21/20 outside [place]; unprovoked; anticoagulation  Patient on Eliquis at home.  Transitioned to heparin prior to surgery.  - resume eliquis       DM type 2 without retinopathy  Patient's FSGs are controlled on current medication regimen.  Last A1c reviewed-   Lab Results   Component Value Date    HGBA1C 5.8 12/09/2024     Most recent fingerstick glucose reviewed- No results for input(s): "POCTGLUCOSE" in the last 24 hours.  Current correctional scale   none    ESRD (end stage renal disease)  Creatine stable for now. BMP " reviewed- noted Estimated Creatinine Clearance: 9.9 mL/min (A) (based on SCr of 6.7 mg/dL (H)). according to latest data. Based on current GFR, CKD stage is end stage.  Monitor UOP and serial BMP and adjust therapy as needed. Renally dose meds. Avoid nephrotoxic medications and procedures.    Dialysis Monday Wednesday Friday.  Has left arm fistula.  Nephrology consulted.  Continue HD    Hemiplegia and hemiparesis following cerebral infarction affecting right dominant side  Noted      BPH (benign prostatic hyperplasia) 2/18/21 prostate bx normal  Resume home medications        VTE Risk Mitigation (From admission, onward)           Ordered     Place JJ hose  Until discontinued         02/17/25 2224     apixaban tablet 5 mg  2 times daily         02/17/25 1610     IP VTE HIGH RISK PATIENT  Once         02/15/25 1525     Place sequential compression device  Until discontinued         02/15/25 1525                          I have completed this tele-visit without the assistance of a telepresenter.    The attending portion of this evaluation, treatment, and documentation was performed per Rosalva Manning MD via Telemedicine AudioVisual using the secure [employer] software platform with 2 way audio/video. The provider was located off-site and the patient is located in the hospital. The aforementioned video software was utilized to document the relevant history and physical exam    Rosalva Manning MD  Department of Hospital Medicine   [facility]

## 2025-02-25 NOTE — NURSING
Accuchecks ordered AC/HS. Dietician talked to patient and ordered renal with consistent carb diet. PT worked with patient, patient tolerated well. Patient AAOx4, VS stable, bed in the lowest position, wheels locked, side rails raised x2, call bell in reach.

## 2025-02-25 NOTE — PT/OT/SLP PROGRESS
Occupational Therapy   Treatment    Name: Miguel Moore  MRN: [id number]  Admitting Diagnosis:  Closed fracture of right hip  8 Days Post-Op    Recommendations:     Discharge Recommendations: Moderate Intensity Therapy  Discharge Equipment Recommendations:  to be determined by next level of care  Barriers to discharge:  Inaccessible home environment, Other (Comment), Decreased caregiver support    Assessment:     Miguel Moore is a 69 y.o. male with a medical diagnosis of Closed fracture of right hip.  He presents with HOB elevated and . Performance deficits affecting function are weakness, impaired endurance, impaired sensation, impaired self care skills, impaired functional mobility, gait instability, impaired balance, decreased coordination, decreased upper extremity function, decreased lower extremity function, decreased safety awareness, decreased ROM, orthopedic precautions. Patient agreed to treatment. He stood three times with rest break and took steps from left to right and remained sitting EOB once session ended.     Rehab Prognosis:  Good; patient would benefit from acute skilled OT services to address these deficits and reach maximum level of function.       Plan:     Patient to be seen 4 x/week to address the above listed problems via self-care/home management, therapeutic activities, therapeutic exercises  Plan of Care Expires: 03/18/25  Plan of Care Reviewed with: patient    Subjective     Chief Complaint: fatigue   Patient/Family Comments/goals: patient said he is still trying to go to a facility   Pain/Comfort:  Pain Rating 1: 5/10  Location - Side 1: Right  Location - Orientation 1: generalized  Location 1: hip  Pain Addressed 1: Cessation of Activity, Nurse notified  Pain Rating Post-Intervention 1: 5/10    Objective:     Communicated with: RN,  prior to session.  Patient found HOB elevated with pressure relief boots, telemetry, central line upon OT entry to room with SO present.    General  Precautions: Standard, diabetic, fall, seizure    Orthopedic Precautions:RLE weight bearing as tolerated, RLE posterior precautions  Braces:  (Darco shoe)  Respiratory Status: Room air     Occupational Performance:     Bed Mobility:    Patient completed Rolling/Turning to Left with  contact guard assistance   Patient completed Scooting/Bridging with contact guard assistance   Patient completed Supine to Sit with contact guard assistance and minimum assistance     Functional Mobility/Transfers:  Patient completed Sit <> Stand Transfer with moderate assistance and of 2  persons  with  hand-held assist   Functional Mobility: Patient took steps with max assist x 2 people with help needed to move right foot and left foot intermittently.     Activities of Daily Living:  Lower Body Dressing: maximal assistance to don sock and Darco shoe       AMPA 6 Click ADL: 12    Treatment & Education:  Occupational therapy educated patient re: LB dressing and  posterior hip precautions.     Patient left sitting edge of bed with all lines intact, call button in reach, and RN  notified    GOALS:   Multidisciplinary Problems       Occupational Therapy Goals          Problem: Occupational Therapy    Goal Priority Disciplines Outcome Interventions   Occupational Therapy Goal     OT, PT/OT Progressing    Description: Goals to be met by: 03/18/2025      Patient will increase functional independence with ADLs by performing:    UE Dressing with Minimal Assistance.  LE Dressing with Maximum Assistance.  Grooming while standing or seated with Minimal Assistance.  Toileting from bedside commode with Moderate Assistance for hygiene and clothing management.   Toilet transfer to bedside commode with Moderate Assistance.  Increased functional strength to WFL for self care LUE.  Upper extremity exercise program x10 reps per handout, with assistance as needed.                          DME Justifications:  No DME recommended requiring DME  justifications    Time Tracking:     OT Date of Treatment: 02/25/25  OT Start Time: 1541  OT Stop Time: 1610  OT Total Time (min): 29 min    Billable Minutes:Self Care/Home Management 15   Therapeutic Activity 14 co-treat with PTA     OT/GENEVA: OT     Number of GENEVA visits since last OT visit: 0    2/25/2025

## 2025-02-25 NOTE — PLAN OF CARE
8:53am: CM spoke to [facility] with [facility], [phone].  She inquired about patient being able to get a later chair time at his HD clinic, for his current time - 7:30am in [place] - is took early for their facility to accommodate.  She stated it would interfere with patient's therapy.  CM to follow up with HD clinic.     9:17am: CM spoke with a nurse at patient's HD clinic, [facility], [phone], in regards to patient being moved to a later chair time.  She stated clinic is technically closed today and advise CM to call back tomorrow to speak with the clinic manager, Joi.  CM to follow up tomorrow.     9:38am: CM spoke with patient's significant other, Tracey Devine, [phone], regarding patient's [employer] Card and bank statements.  She stated she has patient's card but has not set up an account; she stated she spoke with a rep with [employer] and can receive patient's statements in 7-10 days.  CM explained to her that patient is medically stable for discharge and accessing his statements through an online account, if possible, would be quicker.  She verbalized understanding and stated she would try to create the account today with patient at bedside.  CM to continue to assess for and until discharge.

## 2025-02-25 NOTE — NURSING
[facility]  Nurses Note -- 4 Eyes      2/24/2025       Skin assessed on: Q Shift      [] No Pressure Injuries Present    [x]Prevention Measures Documented    [x] Yes LDA  for Pressure Injury Previously documented     [] Yes New Pressure Injury Discovered   [] LDA for New Pressure Injury Added      Attending RN:  Teofilo Angulo, RN     Second RN:  Olivia Ordonez

## 2025-02-25 NOTE — SUBJECTIVE & OBJECTIVE
Interval History: Patient HDS and afebile. No acute events overnight. No new complaints this morning. Eating and drinking well. Pain is well controlled. Having regular Bms. Pt is medically stable for DC, pending placement.       Review of Systems   Constitutional:  Positive for activity change and fatigue. Negative for fever.   Respiratory:  Negative for cough and shortness of breath.    Cardiovascular:  Negative for chest pain.   Gastrointestinal:  Negative for abdominal pain and constipation.   Neurological:  Negative for dizziness and headaches.   Psychiatric/Behavioral:  Negative for confusion.    All other systems reviewed and are negative.    Objective:     Vital Signs (Most Recent):  Temp: 98.3 °F (36.8 °C) (02/25/25 1142)  Pulse: 74 (02/25/25 1142)  Resp: (!) 21 (02/25/25 1142)  BP: 126/75 (02/25/25 1142)  SpO2: 100 % (02/25/25 1142) Vital Signs (24h Range):  Temp:  [98.3 °F (36.8 °C)-99.1 °F (37.3 °C)] 98.3 °F (36.8 °C)  Pulse:  [] 74  Resp:  [16-21] 21  SpO2:  [94 %-100 %] 100 %  BP: (105-126)/(61-75) 126/75     Weight: 72.9 kg (160 lb 11.5 oz)  Body mass index is 24.44 kg/m².    Intake/Output Summary (Last 24 hours) at 2/25/2025 1207  Last data filed at 2/25/2025 0813  Gross per 24 hour   Intake 600 ml   Output 1800 ml   Net -1200 ml         Physical Exam  Vitals and nursing note reviewed.   Constitutional:       Appearance: Normal appearance.   HENT:      Head: Normocephalic and atraumatic.   Cardiovascular:      Rate and Rhythm: Normal rate and regular rhythm.   Pulmonary:      Effort: Pulmonary effort is normal. No respiratory distress.   Abdominal:      General: There is no distension.   Musculoskeletal:         General: Normal range of motion.   Neurological:      Mental Status: He is alert and oriented to person, place, and time.   Psychiatric:         Mood and Affect: Mood normal.         Behavior: Behavior normal.             Significant Labs: All pertinent labs within the past 24 hours have  been reviewed.  CBC:   Recent Labs   Lab 02/24/25  0715   WBC 4.25   HGB 7.5*   HCT 22.8*          CMP:   Recent Labs   Lab 02/24/25  0715   *   K 4.6   CL 99   CO2 21*   *   BUN 48*   CREATININE 10.7*   CALCIUM 8.4*   ANIONGAP 13       Significant Imaging: I have reviewed all pertinent imaging results/findings within the past 24 hours.

## 2025-02-25 NOTE — NURSING
[facility], [facility]  Nurses Note -- 4 Eyes      2/25/2025      Skin assessed on: Q Shift      [] No Pressure Injuries Present    [x]Prevention Measures Documented    [x] Yes LDA  for Pressure Injury Previously documented     [] Yes New Pressure Injury Discovered   [] LDA for New Pressure Injury Added      Attending RN:  Radha Tsai RN     Second RN:  DARRION Mendoza

## 2025-02-26 DIAGNOSIS — Z96.641 STATUS POST RIGHT HIP REPLACEMENT: Primary | ICD-10-CM

## 2025-02-26 LAB
ALBUMIN SERPL BCP-MCNC: 2.3 G/DL (ref 3.5–5.2)
ANION GAP SERPL CALC-SCNC: 12 MMOL/L (ref 8–16)
BASOPHILS # BLD AUTO: 0.02 K/UL (ref 0–0.2)
BASOPHILS NFR BLD: 0.4 % (ref 0–1.9)
BUN SERPL-MCNC: 43 MG/DL (ref 8–23)
CALCIUM SERPL-MCNC: 8.3 MG/DL (ref 8.7–10.5)
CHLORIDE SERPL-SCNC: 98 MMOL/L (ref 95–110)
CO2 SERPL-SCNC: 21 MMOL/L (ref 23–29)
CREAT SERPL-MCNC: 9.8 MG/DL (ref 0.5–1.4)
DIFFERENTIAL METHOD BLD: ABNORMAL
EOSINOPHIL # BLD AUTO: 0.3 K/UL (ref 0–0.5)
EOSINOPHIL NFR BLD: 5.3 % (ref 0–8)
ERYTHROCYTE [DISTWIDTH] IN BLOOD BY AUTOMATED COUNT: 14.1 % (ref 11.5–14.5)
EST. GFR  (NO RACE VARIABLE): 5 ML/MIN/1.73 M^2
GLUCOSE SERPL-MCNC: 117 MG/DL (ref 70–110)
HCT VFR BLD AUTO: 21.6 % (ref 40–54)
HGB BLD-MCNC: 7.1 G/DL (ref 14–18)
IMM GRANULOCYTES # BLD AUTO: 0.04 K/UL (ref 0–0.04)
IMM GRANULOCYTES NFR BLD AUTO: 0.7 % (ref 0–0.5)
LYMPHOCYTES # BLD AUTO: 1.4 K/UL (ref 1–4.8)
LYMPHOCYTES NFR BLD: 26.2 % (ref 18–48)
MCH RBC QN AUTO: 27.4 PG (ref 27–31)
MCHC RBC AUTO-ENTMCNC: 32.9 G/DL (ref 32–36)
MCV RBC AUTO: 83 FL (ref 82–98)
MONOCYTES # BLD AUTO: 0.6 K/UL (ref 0.3–1)
MONOCYTES NFR BLD: 11.5 % (ref 4–15)
NEUTROPHILS # BLD AUTO: 3.1 K/UL (ref 1.8–7.7)
NEUTROPHILS NFR BLD: 55.9 % (ref 38–73)
NRBC BLD-RTO: 0 /100 WBC
PHOSPHATE SERPL-MCNC: 4 MG/DL (ref 2.7–4.5)
PLATELET # BLD AUTO: ABNORMAL K/UL (ref 150–450)
PMV BLD AUTO: ABNORMAL FL (ref 9.2–12.9)
POCT GLUCOSE: 128 MG/DL (ref 70–110)
POCT GLUCOSE: 136 MG/DL (ref 70–110)
POCT GLUCOSE: 159 MG/DL (ref 70–110)
POTASSIUM SERPL-SCNC: 4.6 MMOL/L (ref 3.5–5.1)
RBC # BLD AUTO: 2.59 M/UL (ref 4.6–6.2)
SARS-COV-2 RDRP RESP QL NAA+PROBE: NEGATIVE
SODIUM SERPL-SCNC: 131 MMOL/L (ref 136–145)
WBC # BLD AUTO: 5.49 K/UL (ref 3.9–12.7)

## 2025-02-26 PROCEDURE — 36415 COLL VENOUS BLD VENIPUNCTURE: CPT | Performed by: INTERNAL MEDICINE

## 2025-02-26 PROCEDURE — 86580 TB INTRADERMAL TEST: CPT | Performed by: HOSPITALIST

## 2025-02-26 PROCEDURE — 30200315 PPD INTRADERMAL TEST REV CODE 302: Performed by: HOSPITALIST

## 2025-02-26 PROCEDURE — 25000003 PHARM REV CODE 250: Mod: HCNC | Performed by: ORTHOPAEDIC SURGERY

## 2025-02-26 PROCEDURE — 25000003 PHARM REV CODE 250: Mod: HCNC | Performed by: INTERNAL MEDICINE

## 2025-02-26 PROCEDURE — 25000003 PHARM REV CODE 250: Performed by: HOSPITALIST

## 2025-02-26 PROCEDURE — 85025 COMPLETE CBC W/AUTO DIFF WBC: CPT | Performed by: INTERNAL MEDICINE

## 2025-02-26 PROCEDURE — 63600175 PHARM REV CODE 636 W HCPCS: Mod: JZ,TB,HCNC | Performed by: INTERNAL MEDICINE

## 2025-02-26 PROCEDURE — 25000003 PHARM REV CODE 250: Performed by: STUDENT IN AN ORGANIZED HEALTH CARE EDUCATION/TRAINING PROGRAM

## 2025-02-26 PROCEDURE — 80069 RENAL FUNCTION PANEL: CPT | Performed by: INTERNAL MEDICINE

## 2025-02-26 PROCEDURE — 21400001 HC TELEMETRY ROOM

## 2025-02-26 PROCEDURE — 87635 SARS-COV-2 COVID-19 AMP PRB: CPT | Performed by: HOSPITALIST

## 2025-02-26 PROCEDURE — 80100016 HC MAINTENANCE HEMODIALYSIS

## 2025-02-26 PROCEDURE — 99232 SBSQ HOSP IP/OBS MODERATE 35: CPT | Mod: ,,, | Performed by: PHYSICIAN ASSISTANT

## 2025-02-26 RX ADMIN — ASPIRIN 81 MG CHEWABLE TABLET 81 MG: 81 TABLET CHEWABLE at 07:02

## 2025-02-26 RX ADMIN — PANTOPRAZOLE SODIUM 40 MG: 40 TABLET, DELAYED RELEASE ORAL at 10:02

## 2025-02-26 RX ADMIN — Medication 1 CAPSULE: at 07:02

## 2025-02-26 RX ADMIN — EZETIMIBE 10 MG: 10 TABLET ORAL at 07:02

## 2025-02-26 RX ADMIN — APIXABAN 5 MG: 5 TABLET, FILM COATED ORAL at 10:02

## 2025-02-26 RX ADMIN — DOCUSATE SODIUM 100 MG: 100 CAPSULE, LIQUID FILLED ORAL at 08:02

## 2025-02-26 RX ADMIN — TUBERCULIN PURIFIED PROTEIN DERIVATIVE 5 UNITS: 5 INJECTION, SOLUTION INTRADERMAL at 10:02

## 2025-02-26 RX ADMIN — DOCUSATE SODIUM 100 MG: 100 CAPSULE, LIQUID FILLED ORAL at 10:02

## 2025-02-26 RX ADMIN — SEVELAMER CARBONATE 800 MG: 800 TABLET, FILM COATED ORAL at 04:02

## 2025-02-26 RX ADMIN — FINASTERIDE 5 MG: 5 TABLET, FILM COATED ORAL at 07:02

## 2025-02-26 RX ADMIN — ATORVASTATIN CALCIUM 80 MG: 40 TABLET, FILM COATED ORAL at 07:02

## 2025-02-26 RX ADMIN — SEVELAMER CARBONATE 800 MG: 800 TABLET, FILM COATED ORAL at 07:02

## 2025-02-26 RX ADMIN — OXYCODONE 10 MG: 5 TABLET ORAL at 10:02

## 2025-02-26 RX ADMIN — EPOETIN ALFA-EPBX 10000 UNITS: 10000 INJECTION, SOLUTION INTRAVENOUS; SUBCUTANEOUS at 10:02

## 2025-02-26 RX ADMIN — APIXABAN 5 MG: 5 TABLET, FILM COATED ORAL at 08:02

## 2025-02-26 RX ADMIN — PANTOPRAZOLE SODIUM 40 MG: 40 TABLET, DELAYED RELEASE ORAL at 08:02

## 2025-02-26 RX ADMIN — TAMSULOSIN HYDROCHLORIDE 0.8 MG: 0.4 CAPSULE ORAL at 07:02

## 2025-02-26 NOTE — PT/OT/SLP PROGRESS
Occupational Therapy      Patient Name:  Miguel Moore   MRN:  [id number]    Patient not seen today secondary to Dialysis. Will follow-up later if able.    2/26/2025

## 2025-02-26 NOTE — PT/OT/SLP PROGRESS
Physical Therapy      Patient Name:  Miguel Moore   MRN:  [id number]    Patient not seen today secondary to Dialysis. Will follow-up as able later hour/day .

## 2025-02-26 NOTE — NURSING
[facility], [facility]  Nurses Note -- 4 Eyes      2/26/2025       Skin assessed on: Q Shift      [] No Pressure Injuries Present    []Prevention Measures Documented    [x] Yes LDA  for Pressure Injury Previously documented     [] Yes New Pressure Injury Discovered   [] LDA for New Pressure Injury Added      Attending RN:  Angelica Chavarria LPN     Second RN:  DARRION Phillips

## 2025-02-26 NOTE — ASSESSMENT & PLAN NOTE
Patient's FSGs are controlled on current medication regimen.  Last A1c reviewed-   Lab Results   Component Value Date    HGBA1C 5.8 12/09/2024     Most recent fingerstick glucose reviewed-   Recent Labs   Lab 02/25/25 2006 02/26/25  0734 02/26/25  1327   POCTGLUCOSE 169* 136* 128*     Current correctional scale   none

## 2025-02-26 NOTE — PROGRESS NOTES
[facility]  Telemedicine Progress Note    Patient Name: Miguel Moore  MRN: [id number]  Patient Class: IP- Inpatient   Admission Date: 2/15/2025  Length of Stay: 11 days  Attending Physician: Rosalva Manning MD  Primary Care Provider: Raciel Raymond MD          Subjective:     Principal Problem:Closed fracture of right hip        HPI:  Is a 69-year-old male with history of CVA, hypertension, ESRD on HD, DVT on Eliquis who presents to the emergency department for evaluation after a fall.  Patient reports he gets around with a walker and he slipped and fell to his right hip.  Denies loss of consciousness, hitting of his head or other recent issues.  In the ED, Xray of right hip positive for acute right femoral neck fracture. CT confirmed. Orthopedic Surgery consulted. Plan for OR on 2/17/25. He will be admitted to hospital medicine for further management.     Overview/Hospital Course:  Mr. Moore is a 69-year-old male who was admitted with a right femoral neck fracture.  Orthopedic surgery consulted.S/P right  hip replacement arthroplasty on 02/17/2025.  Nephrology consulted for dialysis.  Wound care is doing wound care on right plantar foot and left heel.  PT,OT ,plan for SNF.    Interval History: Patient HDS and afebile. No acute events overnight. No new complaints this morning. Pt is medically stable for DC, pending placement.  Undergoing HD today.      Review of Systems   Constitutional:  Positive for activity change and fatigue. Negative for fever.   Respiratory:  Negative for cough and shortness of breath.    Cardiovascular:  Negative for chest pain.   Gastrointestinal:  Negative for abdominal pain and constipation.   Neurological:  Negative for dizziness and headaches.   Psychiatric/Behavioral:  Negative for confusion.    All other systems reviewed and are negative.    Objective:     Vital Signs (Most Recent):  Temp: 98.3 °F (36.8 °C) (02/26/25 1326)  Pulse: 99 (02/26/25  1326)  Resp: 18 (02/26/25 1326)  BP: 109/61 (02/26/25 1326)  SpO2: 100 % (02/26/25 1326) Vital Signs (24h Range):  Temp:  [98.3 °F (36.8 °C)-99.1 °F (37.3 °C)] 98.3 °F (36.8 °C)  Pulse:  [73-99] 99  Resp:  [18-21] 18  SpO2:  [98 %-100 %] 100 %  BP: (109-131)/(61-83) 109/61     Weight: 72.9 kg (160 lb 11.5 oz)  Body mass index is 24.44 kg/m².    Intake/Output Summary (Last 24 hours) at 2/26/2025 1338  Last data filed at 2/25/2025 1713  Gross per 24 hour   Intake 240 ml   Output --   Net 240 ml         Physical Exam  Vitals and nursing note reviewed.   Constitutional:       Appearance: Normal appearance.   HENT:      Head: Normocephalic and atraumatic.   Cardiovascular:      Rate and Rhythm: Normal rate and regular rhythm.   Pulmonary:      Effort: Pulmonary effort is normal. No respiratory distress.   Abdominal:      General: There is no distension.   Musculoskeletal:         General: Normal range of motion.   Neurological:      Mental Status: He is alert and oriented to person, place, and time.   Psychiatric:         Mood and Affect: Mood normal.         Behavior: Behavior normal.             Significant Labs: All pertinent labs within the past 24 hours have been reviewed.  CBC:   Recent Labs   Lab 02/26/25  0449   WBC 5.49   HGB 7.1*   HCT 21.6*   PLT SEE COMMENT       CMP:   Recent Labs   Lab 02/26/25  0449   *   K 4.6   CL 98   CO2 21*   *   BUN 43*   CREATININE 9.8*   CALCIUM 8.3*   ALBUMIN 2.3*   ANIONGAP 12       Significant Imaging: I have reviewed all pertinent imaging results/findings within the past 24 hours.    Assessment/Plan:      * Closed fracture of right hip  Patient presents with a fall, subsequent right femoral neck fracture.  Orthopedic surgery consulted, plan for hip replacement on 02/17/2025.  Continue with supportive care and pain control.    - s/p hip replacement on 2/17/25  - PT/OT  - pain control  - pending SNF placement     Slow transit constipation  -Bowel reg  ordered  -resolved    Wounds, multiple  Wound care at home for bilateral lower extremities.  Mainly right lower extremity, wound care comes Tuesdays and Thursdays.  We will consult Wound Care for further management.    Wound care is doing wound care on right plantar foot and left heel.    Paroxysmal atrial fibrillation  Patient has paroxysmal (<7 days) atrial fibrillation. Patient is currently in sinus rhythm. [id number] Score: 3. The patients heart rate in the last 24 hours is as follows:  Pulse  Min: 74  Max: 95     Antiarrhythmics       Anticoagulants  apixaban tablet 5 mg, 2 times daily, Oral    Plan  - Replete lytes with a goal of K>4, Mg >2  - Patient is anticoagulated, see medications listed above.  - Patient's afib is currently controlled          Debility  -Patient noted to be medically stable for discharge at this time  -Has been able to work with OT/PT who recommend placement for further rehabilitation  -Patient pending placement, continue in-hospital care as stated above in the meantime  -More than 20 minutes of my time has been spent discussing and planning just her safe disposition with patient/family/CM/SW/Nursing staff (not including other time spent in clinical discussion and management)  -CM/SW following, appreciate input   -Will place DC orders once placement has been secured        Chronic deep vein thrombosis (DVT) of popliteal vein of right lower extremity 9/21/20 outside [place]; unprovoked; anticoagulation  Patient on Eliquis at home.  Transitioned to heparin prior to surgery.  - resume eliquis       DM type 2 without retinopathy  Patient's FSGs are controlled on current medication regimen.  Last A1c reviewed-   Lab Results   Component Value Date    HGBA1C 5.8 12/09/2024     Most recent fingerstick glucose reviewed-   Recent Labs   Lab 02/25/25 2006 02/26/25  0734 02/26/25  1327   POCTGLUCOSE 169* 136* 128*     Current correctional scale   none    ESRD (end stage renal disease)  Creatine stable  for now. BMP reviewed- noted Estimated Creatinine Clearance: 9.9 mL/min (A) (based on SCr of 6.7 mg/dL (H)). according to latest data. Based on current GFR, CKD stage is end stage.  Monitor UOP and serial BMP and adjust therapy as needed. Renally dose meds. Avoid nephrotoxic medications and procedures.    Dialysis Monday Wednesday Friday.  Has left arm fistula.  Nephrology consulted.  Continue HD    Hemiplegia and hemiparesis following cerebral infarction affecting right dominant side  Noted      BPH (benign prostatic hyperplasia) 2/18/21 prostate bx normal  Resume home medications        VTE Risk Mitigation (From admission, onward)           Ordered     Place JJ hose  Until discontinued         02/17/25 2224     apixaban tablet 5 mg  2 times daily         02/17/25 1610     IP VTE HIGH RISK PATIENT  Once         02/15/25 1525     Place sequential compression device  Until discontinued         02/15/25 1525                          I have completed this tele-visit without the assistance of a telepresenter.    The attending portion of this evaluation, treatment, and documentation was performed per Rosalva Manning MD via Telemedicine AudioVisual using the secure [employer] software platform with 2 way audio/video. The provider was located off-site and the patient is located in the hospital. The aforementioned video software was utilized to document the relevant history and physical exam    Rosalva Manning MD  Department of Hospital Medicine   [facility]

## 2025-02-26 NOTE — PT/OT/SLP PROGRESS
Physical Therapy Treatment    Patient Name:  Miguel Moore   MRN:  [id number]    Recommendations:     Discharge Recommendations: Moderate Intensity Therapy  Discharge Equipment Recommendations: to be determined by next level of care  Barriers to discharge: None    Assessment:     Miguel Moore is a 69 y.o. male admitted with a medical diagnosis of Closed fracture of right hip.  He presents with the following impairments/functional limitations: impaired balance, weakness, impaired sensation, impaired self care skills, impaired functional mobility, gait instability, decreased lower extremity function, decreased upper extremity function, decreased coordination, decreased ROM, impaired endurance, decreased safety awareness, edema, pain, orthopedic precautions, impaired coordination, impaired fine motor .    Rehab Prognosis: Fair; patient would benefit from acute skilled PT services to address these deficits and reach maximum level of function.    Recent Surgery: Procedure(s) (LRB):  ARTHROPLASTY, HIP, TOTAL, USING COMPUTER-ASSISTED NAVIGATION (Right) 8 Days Post-Op    Plan:     During this hospitalization, patient to be seen 6 x/week to address the identified rehab impairments via gait training, therapeutic activities, therapeutic exercises, neuromuscular re-education and progress toward the following goals:    Plan of Care Expires:  03/04/25    Subjective     Chief Complaint: pain   Patient/Family Comments/goals: pt is agreeable to therapy   Pain/Comfort:  Pain Rating 1: 5/10  Location - Side 1: Right  Location 1: leg  Pain Addressed 1: Reposition, Nurse notified  Pain Rating Post-Intervention 1: 5/10      Objective:     Communicated with nurse prior to session.  Patient found HOB elevated with telemetry, bed alarm, peripheral IV upon PT entry to room.     General Precautions: Standard, fall  Orthopedic Precautions: RLE weight bearing as tolerated, RLE posterior precautions  Braces: R Darco shoe  Respiratory Status:  Room air     Functional Mobility:  Bed Mobility:     Rolling Left:  contact guard assistance  Scooting: contact guard assistance  Bridging: contact guard assistance  Supine to Sit: contact guard assistance and minimum assistance  Transfers:  V/T cues for safety , proper technique and L hand placement to push off from bed .    Sit to Stand: x 4 trials bed  moderate assistance X 2 with LUE supported on bedside rail   Gait: pt ambulated 4  side steps x 2 trials with LUE supported , MAX A x 2 ( pt required R knee blocked ) .  Pt with decreased john, increased time in double stance, decreased velocity of limb motion, decreased step length, decreased swing-to-stance ratio, decreased toe-to-floor clearance and decreased weight-shifting ability.Impairments contributing to gait deviations include impaired balance, decreased flexibility, pain, impaired postural control, decreased ROM and decreased strength. V/T cues for safety technique and sequencing .   Balance:  good in sitting, poor in standing       AM-PAC 6 CLICK MOBILITY  Turning over in bed (including adjusting bedclothes, sheets and blankets)?: 3  Sitting down on and standing up from a chair with arms (e.g., wheelchair, bedside commode, etc.): 2  Moving from lying on back to sitting on the side of the bed?: 3  Moving to and from a bed to a chair (including a wheelchair)?: 2  Need to walk in hospital room?: 2  Climbing 3-5 steps with a railing?: 1  Basic Mobility Total Score: 13       Treatment & Education:  Pt performed bed mobility ,transfer and gait training as above.   Instructed / encouraged pt to perform BLE AP, GS, QS throughout the day . Pt verbalized understanding.   Educated pt on posterior hip precautions. Pt verbalized understanding .   Pt performed standing weight shifting side to side with LUE supported , max A x 2 x 4 trials from 45 's to > 1 min each trial.   Patient left sitting edge of bed with all lines intact, call button in reach, and nurse  notified..    GOALS:   Multidisciplinary Problems       Physical Therapy Goals          Problem: Physical Therapy    Goal Priority Disciplines Outcome Interventions   Physical Therapy Goal     PT, PT/OT Progressing    Description: Goals to be met by: 3/4/25     Patient will increase functional independence with mobility by performin. Supine to sit with supervision  2. Rolling to Left and Right with supervision  3. Sit to stand transfer with Minimal Assistance using Quad Cane  4. Bed to chair transfer with Minimal Assistance   5. Gait x10 feet with Minimal Assistance using Quad Cane   6. Wheelchair propulsion x50 feet with Minimal Assistance using left upper/lower extremity  7. Lower extremity exercise program 2 sets x10 reps per handout, with supervision                         DME Justifications:  No DME recommended requiring DME justifications    Time Tracking:     PT Received On: 25  PT Start Time: 1541     PT Stop Time: 1610  PT Total Time (min): 29 min     Billable Minutes: Gait Training 10, Therapeutic Activity 19, and Total Time 29 min with OT     Treatment Type: Treatment  PT/PTA: PTA     Number of PTA visits since last PT visit: 2     2025

## 2025-02-26 NOTE — PLAN OF CARE
1:30pm: MEET spoke with clinic manager, Joi, at [facility], [phone], regarding a later chair time; she stated she could patient's chair time back to 11am on MWF. CM to follow up with Kelsey with [facility], [phone].     1:31pm: CM called [facility] but did not receive an answer    2:58pm: MEET spoke with Kelsey; she stated she spoke to Joi with [facility] and was informed that patient may be moved to a closer clinic.  CM to follow up with Joi.    CM spoke with patient at bedside; he requested assistance with logging into his [employer] account to access his banking statements.  CM attempted to login to account but pt and his significant other could not remember correct username/password combination.  With patient's permission and assistance, CM retrieved pt's card and created an account for him.  CM updated patient's account with current email address on patient's phone, in order to access bank statements. Patient gave permission for bank statements to be emailed to MEET and Elsy for acceptance to facility.  CM forwarded pt's bank statements to work email, then [place].    4:20pm: MEET spoke with Kelsey; she verified facility received auth and would be able to take patient tomorrow if financials are cleared    4:25pm: MEET spoke with Joi; she confirmed that patient would be transferring to [facility], [phone], with a chair time of 12pm.  CM to follow up with [facility] clinic manager, Riana.    4:28pm: MEET spoke with [facility] manager, Riana.  She confirmed that patient will be transferring to her clinic and will have a MWF at 12pm schedule.  CM informed her that patient will be starting Friday, 2/28, if clinic is able to accommodate.  She confirmed patient will be able to start Friday.

## 2025-02-26 NOTE — SUBJECTIVE & OBJECTIVE
Interval History: TIFFANIE. Pt seen during HD today, tolerating well. /68. No complaints, normal appetite.     Review of patient's allergies indicates:   Allergen Reactions    Ace inhibitors      Hyperkalemia 8/2018  Other reaction(s): Unknown    Penicillins Hives     Tolerated cefepime and cefdinir previously    Tizanidine      Felt hot     Current Facility-Administered Medications   Medication Frequency    acetaminophen tablet 650 mg Q4H PRN    apixaban tablet 5 mg BID    aspirin chewable tablet 81 mg Daily    atorvastatin tablet 80 mg Daily    dextrose 50% injection 12.5 g PRN    dextrose 50% injection 12.5 g PRN    dextrose 50% injection 25 g PRN    dextrose 50% injection 25 g PRN    docusate sodium capsule 100 mg BID    epoetin marisol-epbx injection 10,000 Units Every Mon, Wed, Fri    ezetimibe tablet 10 mg Daily    finasteride tablet 5 mg Daily    glucagon (human recombinant) injection 1 mg PRN    glucagon (human recombinant) injection 1 mg PRN    glucose chewable tablet 16 g PRN    glucose chewable tablet 16 g PRN    glucose chewable tablet 24 g PRN    glucose chewable tablet 24 g PRN    HYDROcodone-acetaminophen 5-325 mg per tablet 1 tablet Q6H PRN    insulin aspart U-100 pen 0-5 Units QID (AC + HS) PRN    naloxone 0.4 mg/mL injection 0.02 mg PRN    ondansetron injection 4 mg Q6H PRN    oxyCODONE immediate release tablet 10 mg Q6H PRN    pantoprazole EC tablet 40 mg BID    polyethylene glycol packet 17 g BID    sevelamer carbonate tablet 800 mg TID WM    sodium chloride 0.9% flush 10 mL Q12H PRN    sodium chloride 0.9% flush 2 mL PRN    tamsulosin 24 hr capsule 0.8 mg Daily    vitamin renal formula (B-complex-vitamin c-folic acid) 1 mg per capsule 1 capsule Daily       Objective:     Vital Signs (Most Recent):  Temp: 98.6 °F (37 °C) (02/26/25 0733)  Pulse: 93 (02/26/25 1122)  Resp: 18 (02/26/25 0733)  BP: 109/61 (02/26/25 0733)  SpO2: 98 % (02/26/25 0733) Vital Signs (24h Range):  Temp:  [98.3 °F (36.8  °C)-99.1 °F (37.3 °C)] 98.6 °F (37 °C)  Pulse:  [73-99] 93  Resp:  [18-21] 18  SpO2:  [98 %-100 %] 98 %  BP: (109-131)/(61-83) 109/61     Weight: 72.9 kg (160 lb 11.5 oz) (02/25/25 0700)  Body mass index is 24.44 kg/m².  Body surface area is 1.87 meters squared.    I/O last 3 completed shifts:  In: 720 [P.O.:720]  Out: -      Physical Exam  Vitals and nursing note reviewed.   Constitutional:       Appearance: Normal appearance.   HENT:      Head: Normocephalic.      Nose: Nose normal.      Mouth/Throat:      Mouth: Mucous membranes are moist.   Eyes:      Extraocular Movements: Extraocular movements intact.      Conjunctiva/sclera: Conjunctivae normal.      Pupils: Pupils are equal, round, and reactive to light.   Cardiovascular:      Rate and Rhythm: Normal rate.   Pulmonary:      Effort: Pulmonary effort is normal.      Breath sounds: Normal breath sounds.   Abdominal:      Palpations: Abdomen is soft.   Musculoskeletal:      Cervical back: Normal range of motion.      Right lower leg: No edema.      Left lower leg: No edema.   Skin:     General: Skin is warm.   Neurological:      General: No focal deficit present.      Mental Status: He is alert.   Psychiatric:         Mood and Affect: Mood normal.          Significant Labs:  CBC:   Recent Labs   Lab 02/26/25  0449   WBC 5.49   RBC 2.59*   HGB 7.1*   HCT 21.6*   PLT SEE COMMENT   MCV 83   MCH 27.4   MCHC 32.9     CMP:   Recent Labs   Lab 02/26/25  0449   *   CALCIUM 8.3*   ALBUMIN 2.3*   *   K 4.6   CO2 21*   CL 98   BUN 43*   CREATININE 9.8*     All labs within the past 24 hours have been reviewed.     Significant Imaging:  Labs: Reviewed

## 2025-02-26 NOTE — PLAN OF CARE
Problem: Adult Inpatient Plan of Care  Goal: Plan of Care Review  Outcome: Progressing  Goal: Patient-Specific Goal (Individualized)  Outcome: Progressing     Problem: Skin Injury Risk Increased  Goal: Skin Health and Integrity  Outcome: Progressing

## 2025-02-26 NOTE — NURSING
Pt noted lying down in bed and report received from the off going nurse, Pt noted alert and oriented x 4 .  Noted good thrill and brute to the left upper arm shunt. Pt c/o pain generalized at 7 and hydrocodone 10mg given p.o for pain.  Some mild difficulty swallowing pan pill. Telemetry monitor Intact with SR at 74/min noted.  Dressing to rt. Ivett intact clean and dry and lt heel pressure callulus -like pressure float for comfort with Heel protector boats intact. Will continue to monitor.

## 2025-02-26 NOTE — SUBJECTIVE & OBJECTIVE
Interval History: Patient HDS and afebile. No acute events overnight. No new complaints this morning. Pt is medically stable for DC, pending placement.  Undergoing HD today.      Review of Systems   Constitutional:  Positive for activity change and fatigue. Negative for fever.   Respiratory:  Negative for cough and shortness of breath.    Cardiovascular:  Negative for chest pain.   Gastrointestinal:  Negative for abdominal pain and constipation.   Neurological:  Negative for dizziness and headaches.   Psychiatric/Behavioral:  Negative for confusion.    All other systems reviewed and are negative.    Objective:     Vital Signs (Most Recent):  Temp: 98.3 °F (36.8 °C) (02/26/25 1326)  Pulse: 99 (02/26/25 1326)  Resp: 18 (02/26/25 1326)  BP: 109/61 (02/26/25 1326)  SpO2: 100 % (02/26/25 1326) Vital Signs (24h Range):  Temp:  [98.3 °F (36.8 °C)-99.1 °F (37.3 °C)] 98.3 °F (36.8 °C)  Pulse:  [73-99] 99  Resp:  [18-21] 18  SpO2:  [98 %-100 %] 100 %  BP: (109-131)/(61-83) 109/61     Weight: 72.9 kg (160 lb 11.5 oz)  Body mass index is 24.44 kg/m².    Intake/Output Summary (Last 24 hours) at 2/26/2025 1338  Last data filed at 2/25/2025 1713  Gross per 24 hour   Intake 240 ml   Output --   Net 240 ml         Physical Exam  Vitals and nursing note reviewed.   Constitutional:       Appearance: Normal appearance.   HENT:      Head: Normocephalic and atraumatic.   Cardiovascular:      Rate and Rhythm: Normal rate and regular rhythm.   Pulmonary:      Effort: Pulmonary effort is normal. No respiratory distress.   Abdominal:      General: There is no distension.   Musculoskeletal:         General: Normal range of motion.   Neurological:      Mental Status: He is alert and oriented to person, place, and time.   Psychiatric:         Mood and Affect: Mood normal.         Behavior: Behavior normal.             Significant Labs: All pertinent labs within the past 24 hours have been reviewed.  CBC:   Recent Labs   Lab 02/26/25  0449   WBC  5.49   HGB 7.1*   HCT 21.6*   PLT SEE COMMENT       CMP:   Recent Labs   Lab 02/26/25  0449   *   K 4.6   CL 98   CO2 21*   *   BUN 43*   CREATININE 9.8*   CALCIUM 8.3*   ALBUMIN 2.3*   ANIONGAP 12       Significant Imaging: I have reviewed all pertinent imaging results/findings within the past 24 hours.

## 2025-02-26 NOTE — NURSING
[facility] [place] 4 [place]      2/25/2025       Skin assessed on: Q Shift      [] No Pressure Injuries Present    []Prevention Measures Documented    [x] Yes LDA  for Pressure Injury Previously documented     [] Yes New Pressure Injury Discovered   [] LDA for New Pressure Injury Added      Attending RN:  Jacqueline Khalil RN     Second RN:  DARRION Garcia

## 2025-02-26 NOTE — PROGRESS NOTES
[facility] Surg  Nephrology  Progress Note    Patient Name: Miguel Moore  MRN: [id number]  Admission Date: 2/15/2025  Hospital Length of Stay: 11 days  Attending Provider: Rosalva Manning MD   Primary Care Physician: Raciel Raymond MD  Principal Problem:Closed fracture of right hip    Subjective:     HPI: This is a 69 y.o. M with a PMHx of MI, seizures, T2DM, HTN, DVT , ESRD on HD presented to ED 2/15/24 via EMS with traumatic right hip pain s/p fall. Right hip xray + femoral neck fracture. Pt admitted for surgical management. Nephrology consulted for hemodialysis. Plan for right Primary Total Hip Arthroplasty 2/16/25    Prior records obtained and reviewed.  last HD session 2/14/25  Attending Nephrologist: CLEMENTE SHAHID MD  Dialysis Location: [facility] D[facility]YS[facility]  Schedule: M-W-F   Duration 3.5hrs  EDW 70.2kg  LUE AVF    Interval History: NAEON. Pt seen during HD today, tolerating well. /68. No complaints, normal appetite.     Review of patient's allergies indicates:   Allergen Reactions    Ace inhibitors      Hyperkalemia 8/2018  Other reaction(s): Unknown    Penicillins Hives     Tolerated cefepime and cefdinir previously    Tizanidine      Felt hot     Current Facility-Administered Medications   Medication Frequency    acetaminophen tablet 650 mg Q4H PRN    apixaban tablet 5 mg BID    aspirin chewable tablet 81 mg Daily    atorvastatin tablet 80 mg Daily    dextrose 50% injection 12.5 g PRN    dextrose 50% injection 12.5 g PRN    dextrose 50% injection 25 g PRN    dextrose 50% injection 25 g PRN    docusate sodium capsule 100 mg BID    epoetin marisol-epbx injection 10,000 Units Every Mon, Wed, Fri    ezetimibe tablet 10 mg Daily    finasteride tablet 5 mg Daily    glucagon (human recombinant) injection 1 mg PRN    glucagon (human recombinant) injection 1 mg PRN    glucose chewable tablet 16 g PRN    glucose chewable tablet 16 g PRN    glucose chewable tablet 24 g PRN    glucose chewable tablet  24 g PRN    HYDROcodone-acetaminophen 5-325 mg per tablet 1 tablet Q6H PRN    insulin aspart U-100 pen 0-5 Units QID (AC + HS) PRN    naloxone 0.4 mg/mL injection 0.02 mg PRN    ondansetron injection 4 mg Q6H PRN    oxyCODONE immediate release tablet 10 mg Q6H PRN    pantoprazole EC tablet 40 mg BID    polyethylene glycol packet 17 g BID    sevelamer carbonate tablet 800 mg TID WM    sodium chloride 0.9% flush 10 mL Q12H PRN    sodium chloride 0.9% flush 2 mL PRN    tamsulosin 24 hr capsule 0.8 mg Daily    vitamin renal formula (B-complex-vitamin c-folic acid) 1 mg per capsule 1 capsule Daily       Objective:     Vital Signs (Most Recent):  Temp: 98.6 °F (37 °C) (02/26/25 0733)  Pulse: 93 (02/26/25 1122)  Resp: 18 (02/26/25 0733)  BP: 109/61 (02/26/25 0733)  SpO2: 98 % (02/26/25 0733) Vital Signs (24h Range):  Temp:  [98.3 °F (36.8 °C)-99.1 °F (37.3 °C)] 98.6 °F (37 °C)  Pulse:  [73-99] 93  Resp:  [18-21] 18  SpO2:  [98 %-100 %] 98 %  BP: (109-131)/(61-83) 109/61     Weight: 72.9 kg (160 lb 11.5 oz) (02/25/25 0700)  Body mass index is 24.44 kg/m².  Body surface area is 1.87 meters squared.    I/O last 3 completed shifts:  In: 720 [P.O.:720]  Out: -      Physical Exam  Vitals and nursing note reviewed.   Constitutional:       Appearance: Normal appearance.   HENT:      Head: Normocephalic.      Nose: Nose normal.      Mouth/Throat:      Mouth: Mucous membranes are moist.   Eyes:      Extraocular Movements: Extraocular movements intact.      Conjunctiva/sclera: Conjunctivae normal.      Pupils: Pupils are equal, round, and reactive to light.   Cardiovascular:      Rate and Rhythm: Normal rate.   Pulmonary:      Effort: Pulmonary effort is normal.      Breath sounds: Normal breath sounds.   Abdominal:      Palpations: Abdomen is soft.   Musculoskeletal:      Cervical back: Normal range of motion.      Right lower leg: No edema.      Left lower leg: No edema.   Skin:     General: Skin is warm.   Neurological:       General: No focal deficit present.      Mental Status: He is alert.   Psychiatric:         Mood and Affect: Mood normal.          Significant Labs:  CBC:   Recent Labs   Lab 02/26/25  0449   WBC 5.49   RBC 2.59*   HGB 7.1*   HCT 21.6*   PLT SEE COMMENT   MCV 83   MCH 27.4   MCHC 32.9     CMP:   Recent Labs   Lab 02/26/25  0449   *   CALCIUM 8.3*   ALBUMIN 2.3*   *   K 4.6   CO2 21*   CL 98   BUN 43*   CREATININE 9.8*     All labs within the past 24 hours have been reviewed.     Significant Imaging:  Labs: Reviewed    Assessment/Plan:     ESRD  - HD MWF;   - HD today, UF 2L  - labs MWF     Anemia of CKD  - hgb 7.1 today, below goal  - PERRY with HD  - labs MWF     Secondary HPTH   - CCa 9.7, acceptable  - phos 4, controlled; continue sevelamer.  - labs MWF     Hyponatremia   - Na 131 today;   - HD as above      Thank you for your consult. I will follow-up with patient. Please contact us if you have any additional questions.    HANNAH uHynh  Nephrology  [facility] - Med Surg

## 2025-02-27 VITALS
RESPIRATION RATE: 18 BRPM | HEIGHT: 68 IN | OXYGEN SATURATION: 99 % | TEMPERATURE: 98 F | SYSTOLIC BLOOD PRESSURE: 131 MMHG | HEART RATE: 76 BPM | DIASTOLIC BLOOD PRESSURE: 78 MMHG | BODY MASS INDEX: 24.35 KG/M2 | WEIGHT: 160.69 LBS

## 2025-02-27 LAB
POCT GLUCOSE: 118 MG/DL (ref 70–110)
POCT GLUCOSE: 151 MG/DL (ref 70–110)
POCT GLUCOSE: 166 MG/DL (ref 70–110)

## 2025-02-27 PROCEDURE — 25000003 PHARM REV CODE 250: Performed by: INTERNAL MEDICINE

## 2025-02-27 PROCEDURE — 25000003 PHARM REV CODE 250: Performed by: HOSPITALIST

## 2025-02-27 PROCEDURE — 25000003 PHARM REV CODE 250: Performed by: ORTHOPAEDIC SURGERY

## 2025-02-27 PROCEDURE — 25000003 PHARM REV CODE 250: Performed by: STUDENT IN AN ORGANIZED HEALTH CARE EDUCATION/TRAINING PROGRAM

## 2025-02-27 PROCEDURE — 99232 SBSQ HOSP IP/OBS MODERATE 35: CPT | Mod: ,,, | Performed by: PHYSICIAN ASSISTANT

## 2025-02-27 RX ORDER — ATORVASTATIN CALCIUM 80 MG/1
80 TABLET, FILM COATED ORAL DAILY
Start: 2025-02-27 | End: 2026-02-27

## 2025-02-27 RX ORDER — FINASTERIDE 5 MG/1
5 TABLET, FILM COATED ORAL DAILY
Start: 2025-02-27 | End: 2026-02-27

## 2025-02-27 RX ORDER — HYDROCODONE BITARTRATE AND ACETAMINOPHEN 5; 325 MG/1; MG/1
1 TABLET ORAL EVERY 6 HOURS PRN
Start: 2025-02-27 | End: 2025-02-27

## 2025-02-27 RX ORDER — HYDROCODONE BITARTRATE AND ACETAMINOPHEN 5; 325 MG/1; MG/1
1 TABLET ORAL EVERY 8 HOURS PRN
Qty: 15 TABLET | Refills: 0 | Status: SHIPPED | OUTPATIENT
Start: 2025-02-27

## 2025-02-27 RX ADMIN — APIXABAN 5 MG: 5 TABLET, FILM COATED ORAL at 08:02

## 2025-02-27 RX ADMIN — Medication 1 CAPSULE: at 07:02

## 2025-02-27 RX ADMIN — SEVELAMER CARBONATE 800 MG: 800 TABLET, FILM COATED ORAL at 07:02

## 2025-02-27 RX ADMIN — FINASTERIDE 5 MG: 5 TABLET, FILM COATED ORAL at 07:02

## 2025-02-27 RX ADMIN — PANTOPRAZOLE SODIUM 40 MG: 40 TABLET, DELAYED RELEASE ORAL at 08:02

## 2025-02-27 RX ADMIN — ASPIRIN 81 MG CHEWABLE TABLET 81 MG: 81 TABLET CHEWABLE at 07:02

## 2025-02-27 RX ADMIN — DOCUSATE SODIUM 100 MG: 100 CAPSULE, LIQUID FILLED ORAL at 08:02

## 2025-02-27 RX ADMIN — POLYETHYLENE GLYCOL 3350 17 G: 17 POWDER, FOR SOLUTION ORAL at 08:02

## 2025-02-27 RX ADMIN — TAMSULOSIN HYDROCHLORIDE 0.8 MG: 0.4 CAPSULE ORAL at 07:02

## 2025-02-27 RX ADMIN — SEVELAMER CARBONATE 800 MG: 800 TABLET, FILM COATED ORAL at 12:02

## 2025-02-27 RX ADMIN — ATORVASTATIN CALCIUM 80 MG: 40 TABLET, FILM COATED ORAL at 07:02

## 2025-02-27 RX ADMIN — HYDROCODONE BITARTRATE AND ACETAMINOPHEN 1 TABLET: 5; 325 TABLET ORAL at 06:02

## 2025-02-27 RX ADMIN — EZETIMIBE 10 MG: 10 TABLET ORAL at 07:02

## 2025-02-27 NOTE — NURSING
[facility] [place]  Nurses Note -- 4 Eyes      2/26/2025       Skin assessed on: Q Shift      [] No Pressure Injuries Present    []Prevention Measures Documented    [x] Yes LDA  for Pressure Injury Previously documented     [] Yes New Pressure Injury Discovered   [] LDA for New Pressure Injury Added      Attending RN:  Stephanie Barthelemy, RN     Second RN:  Angelica Chavarria LPN

## 2025-02-27 NOTE — PLAN OF CARE
[facility] FOLLOW UP APPOINTMENT          The following appointments have been scheduled for patient by Case Management.   Post Acute Facility to arrange patient transportation to and from specialty appointments during patient stay at facility.           Contact information for follow-up providers       [facility]   Why: Resume treatments MWF at 12pm  Contact information:  [address]  [facility] [address]  [phone]      Chris Quesada MD. Go on 3/11/2025.   Specialty: Orthopedic Surgery  Why: Appointment scheduled for 1:40pm  Contact information:  [address] [place] [address]  [phone]      Dipak Lim MD. Go on 3/17/2025.   Specialty: Surgery  Why: Appointment scheduled for 3:15pm in [address]  Contact information:  [address] [place] [address]  [phone]

## 2025-02-27 NOTE — PLAN OF CARE
NURSING HOME ORDERS    02/27/2025  [facility] [place] - MED SURG  [address] [facility]  [place] [address]  Dept: [phone]  Loc: [phone]     Admit to Nursing Home:  Skilled Nursing Facility    Diagnoses:  Active Hospital Problems    Diagnosis  POA    *Closed fracture of right hip [S72.001A]  Yes    Slow transit constipation [K59.01]  Yes    Wounds, multiple [T07.XXXA]  Yes     Chronic    Paroxysmal atrial fibrillation [I48.0]  Yes     Chronic     3/20/24 TTE LV normal systolic function LVEF 55-60%.  Converted to sinus rhythm on amiodarone   Amiodarone stopped on hospital discharge due to possible contributor to transaminitis      Debility [R53.81]  Yes    Chronic deep vein thrombosis (DVT) of popliteal vein of right lower extremity 9/21/20 outside US; unprovoked; anticoagulation [I82.531]  Yes    DM type 2 without retinopathy [E11.9]  Yes    ESRD (end stage renal disease) [N18.6]  Yes     Chronic     2/27/20 renal US with dopplers no ALF.  Medical renal disease  8/2020 renal US: 1. Symmetric diffusely increased cortical echogenicity and elevated resistive indices, nonspecific indicators of chronic medical renal disease.  2. Prostatomegaly.  Some of the provided images are suggestive of a distinct hyperechoic component of the prostate gland, of uncertain etiology or significance, and potentially artifactual.  Dedicated prostate ultrasound or MRI may be of benefit for further characterization.    Started on HD while hospitalized for progression to ESRD 8/2020  -Continue dialysis per nephrology  -Outpatient HD has been arranged  -Had planned discharge 8/27 if remained afebrile and cultures negative.  Unfortunately his blood culture grew Candida parapsilosis  -Dr. Gomez with ID consulted and case discussed with him.  Started micafungin 8/27 and now on fluconazole.  -Dialysis line removed after HD 8/28 and plan line holiday over weekend.  -new line by  IR on 8/31, tolerated dialysis fluid.  -continue outpatient dialysis.      BPH (benign prostatic hyperplasia) 2/18/21 prostate bx normal [N40.0]  Yes     Chronic     6/26/2017 renal US mod prostatomegaly.      Hemiplegia and hemiparesis following cerebral infarction affecting right dominant side [I69.351]  Not Applicable     Chronic      Resolved Hospital Problems   No resolved problems to display.       Patient is homebound due to:  Closed fracture of right hip    Allergies:  Review of patient's allergies indicates:   Allergen Reactions    Ace inhibitors      Hyperkalemia 8/2018  Other reaction(s): Unknown    Penicillins Hives     Tolerated cefepime and cefdinir previously    Tizanidine      Felt hot       Vitals:  Routine    Diet: renal diet    Activities:   Activity as tolerated    Goals of Care Treatment Preferences:  Code Status: Full Code       LaPOST: Yes         Nursing Precautions:  Aspiration , Fall, and Pressure ulcer prevention    Wound Care:  Local wound care to right foot wounds every Tuesday and Friday- Cleanse wounds with Vashe. Fill wounds with Aquacel Ag hydrofiber. Cover with dry gauze and secure with Kerlix roll. Use Darco sandal over dressing for ambulation.    Consults:   PT to evaluate and treat- 5 times a week, OT to evaluate and treat- 5 times a week, and Nutrition to evaluate and recommend diet     Miscellaneous Care: Routine Skin for Bedridden Patients:  Apply moisture barrier cream to all                   Medications: Discontinue all previous medication orders, if any. See new list below.     Medication List        START taking these medications      HYDROcodone-acetaminophen 5-325 mg per tablet  Commonly known as: NORCO  Take 1 tablet by mouth every 6 (six) hours as needed for Pain.            CONTINUE taking these medications      apixaban 5 mg Tab  Commonly known as: ELIQUIS  Take 1 tablet (5 mg total) by mouth 2 (two) times daily.     aspirin 81 MG Chew  Take 81 mg by mouth once  daily.     atorvastatin 80 MG tablet  Commonly known as: LIPITOR  Take 1 tablet (80 mg total) by mouth once daily.     ezetimibe 10 mg tablet  Commonly known as: ZETIA  Take 1 tablet (10 mg total) by mouth once daily. Continue taking Atorvastatin     finasteride 5 mg tablet  Commonly known as: PROSCAR  Take 1 tablet (5 mg total) by mouth once daily.     pantoprazole 40 MG tablet  Commonly known as: PROTONIX  Take 1 tablet (40 mg total) by mouth 2 (two) times daily.     RENVELA 800 mg Tab  Generic drug: sevelamer carbonate  Take 1 tablet (800 mg total) by mouth 3 (three) times daily with meals.     tamsulosin 0.4 mg Cap  Commonly known as: FLOMAX  Take 2 capsules (0.8 mg total) by mouth once daily.     TRIPHROCAPS 1 mg Cap  Generic drug: vitamin renal formula (B-complex-vitamin c-folic acid)  Take 1 capsule by mouth once daily.                Immunizations Administered as of 2/27/2025       Name Date Dose VIS Date Route Exp Date    COVID-19, MRNA, LN-S, PF (Moderna) 11/10/2021 0.5 mL 1/1/2021 -- --    : [employer]     Lot: [id number]     Comment: Adminis     COVID-19, MRNA, LN-S, PF (Moderna) 11/10/2021 0.5 mL -- -- --    : [employer]     Lot: [id number]     COVID-19, MRNA, LN-S, PF (Moderna) 3/12/2021 0.5 mL 10/6/2009 Intramuscular --    Site: Left arm     : Modern[employer]     Lot: [id number]     Comment: Adminis     COVID-19, MRNA, LN-S, PF (Moderna) 3/12/2021 0.5 mL -- Intramuscular --    Site: Left arm     : Modern[employer]     Lot: [id number]     COVID-19, MRNA, LN-S, PF (Moderna) 2/11/2021 0.5 mL 12/1/2020 Intramuscular --    : Modern[employer]     Comment: Adminis     COVID-19, MRNA, LN-S, PF (Moderna) 2/11/2021 0.5 mL -- Intramuscular --    : Modern[employer]                 Some patients may experience side effects after vaccination.  These may include fever, headache, muscle or joint aches.  Most symptoms resolve with 24-48 hours and  do not require urgent medical evaluation unless they persist for more than 72 hours or symptoms are concerning for an unrelated medical condition.          _________________________________  Andres Isaac MD  02/27/2025

## 2025-02-27 NOTE — ASSESSMENT & PLAN NOTE
Creatine stable for now. BMP reviewed- noted Estimated Creatinine Clearance: 6.8 mL/min (A) (based on SCr of 9.8 mg/dL (H)). according to latest data. Based on current GFR, CKD stage is end stage.  Monitor UOP and serial BMP and adjust therapy as needed. Renally dose meds. Avoid nephrotoxic medications and procedures.    Dialysis Monday Wednesday Friday.  Has left arm fistula.  Nephrology consulted.  Continue HD

## 2025-02-27 NOTE — NURSING
[facility]  Nurses Note -- 4 Eyes      2/27/2025       Skin assessed on: Q Shift      [x] No Pressure Injuries Present    [x]Prevention Measures Documented    [] Yes LDA  for Pressure Injury Previously documented     [] Yes New Pressure Injury Discovered   [] LDA for New Pressure Injury Added      Attending RN:  Tamara Lange LPN     Second RN:  DARRION Hills

## 2025-02-27 NOTE — DISCHARGE SUMMARY
[facility] Medicine  Discharge Summary      Patient Name: Miguel Moore  MRN: [id number]  Patient Class: IP- Inpatient  Admission Date: 2/15/2025  Hospital Length of Stay: 12 days  Discharge Date and Time:  02/27/2025 8:24 AM  Attending Physician: Andres Peacock MD   Discharging Provider: Andres Isaac MD  Primary Care Provider: Raciel Raymond MD      HPI:   Is a 69-year-old male with history of CVA, hypertension, ESRD on HD, DVT on Eliquis who presents to the emergency department for evaluation after a fall.  Patient reports he gets around with a walker and he slipped and fell to his right hip.  Denies loss of consciousness, hitting of his head or other recent issues.  In the ED, Xray of right hip positive for acute right femoral neck fracture. CT confirmed. Orthopedic Surgery consulted. Plan for OR on 2/17/25. He will be admitted to hospital medicine for further management.     Procedure(s) (LRB):  ARTHROPLASTY, HIP, TOTAL, USING COMPUTER-ASSISTED NAVIGATION (Right)      Hospital Course:   Mr. Moore is a 69-year-old male who was admitted with a right femoral neck fracture.  Orthopedic surgery consulted.S/P right  hip replacement arthroplasty on 02/17/2025.  Nephrology consulted for dialysis.  Wound care is doing wound care on right plantar foot and left heel.  PT,OT ,plan for SNF.     Goals of Care Treatment Preferences:  Code Status: Full Code       LaPOST: Yes           Consults:   Consults (From admission, onward)          Status Ordering Provider     Inpatient virtual consult to Hospital Medicine  Once        Provider:  Andres Peacock MD    Completed ANNABEL COVARRUBIAS     Inpatient consult to Orthopedic Surgery  Once        Provider:  Ke Quesada MD    Completed KE QUESADA     Inpatient consult to Nephrology  Once        Provider:  Saranya Reid PA    Completed DANNY STEVENSON            Neuro  Hemiplegia and hemiparesis following cerebral infarction affecting  right dominant side  Noted      Cardiac/Vascular  Paroxysmal atrial fibrillation  Patient has paroxysmal (<7 days) atrial fibrillation. Patient is currently in sinus rhythm. [id number] Score: 3. The patients heart rate in the last 24 hours is as follows:  Pulse  Min: 74  Max: 103     Antiarrhythmics       Anticoagulants  apixaban tablet 5 mg, 2 times daily, Oral    Plan  - Replete lytes with a goal of K>4, Mg >2  - Patient is anticoagulated, see medications listed above.  - Patient's afib is currently controlled          Renal/  ESRD (end stage renal disease)  Creatine stable for now. BMP reviewed- noted Estimated Creatinine Clearance: 6.8 mL/min (A) (based on SCr of 9.8 mg/dL (H)). according to latest data. Based on current GFR, CKD stage is end stage.  Monitor UOP and serial BMP and adjust therapy as needed. Renally dose meds. Avoid nephrotoxic medications and procedures.    Dialysis Monday Wednesday Friday.  Has left arm fistula.  Nephrology consulted.  Continue HD    BPH (benign prostatic hyperplasia) 2/18/21 prostate bx normal  Resume home medications      Hematology  Chronic deep vein thrombosis (DVT) of popliteal vein of right lower extremity 9/21/20 outside [place]; unprovoked; anticoagulation  Patient on Eliquis at home.  Transitioned to heparin prior to surgery.  - resume eliquis       Endocrine  DM type 2 without retinopathy  Patient's FSGs are controlled on current medication regimen.  Last A1c reviewed-   Lab Results   Component Value Date    HGBA1C 5.8 12/09/2024     Most recent fingerstick glucose reviewed-   Recent Labs   Lab 02/26/25  1327 02/26/25  2036 02/27/25  0731   POCTGLUCOSE 128* 159* 118*       Current correctional scale   none    GI  Slow transit constipation  -Bowel reg ordered  -resolved    Orthopedic  * Closed fracture of right hip  Patient presents with a fall, subsequent right femoral neck fracture.  Orthopedic surgery consulted, plan for hip replacement on 02/17/2025.  Continue with  supportive care and pain control.    - s/p hip replacement on 2/17/25  - PT/OT  - pain control  - pending SNF placement     Wounds, multiple  Wound care at home for bilateral lower extremities.  Mainly right lower extremity, wound care comes Tuesdays and Thursdays.  We will consult Wound Care for further management.    Wound care is doing wound care on right plantar foot and left heel.    Other  Debility  -Patient noted to be medically stable for discharge at this time  -Has been able to work with OT/PT who recommend placement for further rehabilitation  -Patient pending placement, continue in-hospital care as stated above in the meantime  -More than 20 minutes of my time has been spent discussing and planning just her safe disposition with patient/family/CM/SW/Nursing staff (not including other time spent in clinical discussion and management)  -CM/SW following, appreciate input   -Will place DC orders once placement has been secured          Final Active Diagnoses:    Diagnosis Date Noted POA    PRINCIPAL PROBLEM:  Closed fracture of right hip [S72.001A] 02/15/2025 Yes    Slow transit constipation [K59.01] 02/20/2025 Yes    Wounds, multiple [T07.XXXA] 08/22/2024 Yes     Chronic    Paroxysmal atrial fibrillation [I48.0] 03/20/2024 Yes     Chronic    Debility [R53.81] 01/05/2021 Yes    Chronic deep vein thrombosis (DVT) of popliteal vein of right lower extremity 9/21/20 outside US; unprovoked; anticoagulation [I82.531] 09/21/2020 Yes    DM type 2 without retinopathy [E11.9] 06/09/2017 Yes    ESRD (end stage renal disease) [N18.6] 03/22/2017 Yes     Chronic    BPH (benign prostatic hyperplasia) 2/18/21 prostate bx normal [N40.0] 03/21/2017 Yes     Chronic    Hemiplegia and hemiparesis following cerebral infarction affecting right dominant side [I69.351] 03/21/2017 Not Applicable     Chronic      Problems Resolved During this Admission:       Discharged Condition: good    Disposition: Skilled Nursing  Facility    Follow Up:   Contact information for follow-up providers       [facility] Follow up.    Why: Resume treatments MWF at 12pm  [facility] information:  [address] [facility] [address] [phone]                     Contact information for after-discharge care       Destination       [facility] .    Service: Skilled Nursing  Contact information:  [address] [facility] [address]  [facility] [address] [phone]                                 Patient Instructions:      Diet Adult Regular     Notify your health care provider if you experience any of the following:  temperature >100.4     Activity as tolerated       Significant Diagnostic Studies: N/A    Pending Diagnostic Studies:       None           Medications:  Reconciled Home Medications:      Medication List        START taking these medications      HYDROcodone-acetaminophen 5-325 mg per tablet  Commonly known as: NORCO  Take 1 tablet by mouth every 6 (six) hours as needed for Pain.            CONTINUE taking these medications      apixaban 5 mg Tab  Commonly known as: ELIQUIS  Take 1 tablet (5 mg total) by mouth 2 (two) times daily.     aspirin 81 MG Chew  Take 81 mg by mouth once daily.     atorvastatin 80 MG tablet  Commonly known as: LIPITOR  Take 1 tablet (80 mg total) by mouth once daily.     ezetimibe 10 mg tablet  Commonly known as: ZETIA  Take 1 tablet (10 mg total) by mouth once daily. Continue taking Atorvastatin     finasteride 5 mg tablet  Commonly known as: PROSCAR  Take 1 tablet (5 mg total) by mouth once daily.     pantoprazole 40 MG tablet  Commonly known as: PROTONIX  Take 1 tablet (40 mg total) by mouth 2 (two) times daily.     RENVELA 800 mg Tab  Generic drug: sevelamer carbonate  Take 1 tablet (800 mg total) by mouth 3 (three) times daily with meals.     tamsulosin 0.4 mg Cap  Commonly known as: FLOMAX  Take 2 capsules (0.8 mg total) by mouth once  daily.     TRIPHROCAPS 1 mg Cap  Generic drug: vitamin renal formula (B-complex-vitamin c-folic acid)  Take 1 capsule by mouth once daily.              Indwelling Lines/Drains at time of discharge:   Lines/Drains/Airways       Drain  Duration                  Hemodialysis AV Fistula Left upper arm -- days                    Time spent on the discharge of patient: 45 minutes         The attending portion of this evaluation, treatment, and documentation was performed per Andres Isaac MD via Telemedicine AudioVisual using the secure Frame[employer] software platform with 2 way audio/video. The provider was located off-site and the patient is located in the hospital. The aforementioned video software was utilized to document the relevant history and physical exam    Andres Isaac MD  Department of Hospital Medicine  [facility] Surg

## 2025-02-27 NOTE — ASSESSMENT & PLAN NOTE
Patient has paroxysmal (<7 days) atrial fibrillation. Patient is currently in sinus rhythm. [id number] Score: 3. The patients heart rate in the last 24 hours is as follows:  Pulse  Min: 74  Max: 103     Antiarrhythmics       Anticoagulants  apixaban tablet 5 mg, 2 times daily, Oral    Plan  - Replete lytes with a goal of K>4, Mg >2  - Patient is anticoagulated, see medications listed above.  - Patient's afib is currently controlled

## 2025-02-27 NOTE — PLAN OF CARE
02/27/25 1107   Medicare Message   Important Message from Medicare regarding Discharge Appeal Rights Given to patient/caregiver;Explained to patient/caregiver;Signed/date by patient/caregiver   Date IMM was signed 02/27/25   Time IMM was signed 0910

## 2025-02-27 NOTE — PLAN OF CARE
Problem: Adult Inpatient Plan of Care  Goal: Absence of Hospital-Acquired Illness or Injury  Outcome: Progressing  Intervention: Prevent Infection  Flowsheets (Taken 2/27/2025 0327)  Infection Prevention:   cohorting utilized   environmental surveillance performed   hand hygiene promoted   rest/sleep promoted   single patient room provided  Goal: Optimal Comfort and Wellbeing  Outcome: Progressing  Intervention: Monitor Pain and Promote Comfort  Flowsheets (Taken 2/27/2025 0327)  Pain Management Interventions: medication offered  Intervention: Provide Person-Centered Care  Flowsheets (Taken 2/27/2025 0327)  Trust Relationship/Rapport:   care explained   empathic listening provided   reassurance provided   thoughts/feelings acknowledged     Problem: Diabetes Comorbidity  Goal: Blood Glucose Level Within Targeted Range  Outcome: Progressing  Intervention: Monitor and Manage Glycemia  Flowsheets (Taken 2/27/2025 0327)  Glycemic Management: blood glucose monitored     Problem: Wound  Goal: Optimal Pain Control and Function  Outcome: Progressing  Intervention: Prevent or Manage Pain  Flowsheets (Taken 2/27/2025 0327)  Pain Management Interventions: medication offered  Goal: Skin Health and Integrity  Outcome: Progressing  Intervention: Optimize Skin Protection  Flowsheets (Taken 2/27/2025 0327)  Pressure Reduction Techniques: frequent weight shift encouraged     Problem: Hip Arthroplasty  Goal: Absence of Bleeding  Outcome: Progressing  Intervention: Monitor and Manage Bleeding  Flowsheets (Taken 2/27/2025 0327)  Bleeding Management: dressing monitored

## 2025-02-27 NOTE — ASSESSMENT & PLAN NOTE
Patient's FSGs are controlled on current medication regimen.  Last A1c reviewed-   Lab Results   Component Value Date    HGBA1C 5.8 12/09/2024     Most recent fingerstick glucose reviewed-   Recent Labs   Lab 02/26/25  1327 02/26/25 2036 02/27/25  0731   POCTGLUCOSE 128* 159* 118*       Current correctional scale   none

## 2025-02-27 NOTE — PLAN OF CARE
Case Management Final Discharge Note    Discharge Disposition: Skilled nursing at [facility], [phone].     New DME ordered / company name: None    Relevant SDOH / Transition of Care Barriers:  None    Person available to provide assistance at home when needed and their contact information: Tracey Devine (Significant other) [phone]       Scheduled followup appointment: Follow up appointments with Vascular Surgery and Orthopedic Surgery    Referrals placed: None    Transportation: Wheelchair van requested to transport patient to facility         Patient and family educated on discharge services and updated on DC plan.  Patient to discharge to [facility] he will also continue to receive OP HD treatments at [facility], [phone].  Bedside RN notified, patient clear to discharge from Case Management Perspective.    02/27/25 1435   Final Note   Assessment Type Final Discharge Note   Anticipated Discharge Disposition SNF   Hospital Resources/Appts/Education Provided Provided patient/caregiver with written discharge plan information;Appointments scheduled and added to AVS;Post-Acute resouces added to AVS   Post-Acute Status   Post-Acute Authorization Placement   Post-Acute Placement Status Set-up Complete/Auth obtained   Coverage [employer] MANAGED MEDICARE - [employer] MEDICARE [employer] - CAPITATED   Discharge Delays None known at this time

## 2025-02-27 NOTE — SUBJECTIVE & OBJECTIVE
Interval History: UF 2.5L. NAEON. Normal appetite. No complaints. SNF confirmed at [facility], awaiting discharge.    Review of patient's allergies indicates:   Allergen Reactions    Ace inhibitors      Hyperkalemia 8/2018  Other reaction(s): Unknown    Penicillins Hives     Tolerated cefepime and cefdinir previously    Tizanidine      Felt hot     Current Facility-Administered Medications   Medication Frequency    acetaminophen tablet 650 mg Q4H PRN    apixaban tablet 5 mg BID    aspirin chewable tablet 81 mg Daily    atorvastatin tablet 80 mg Daily    dextrose 50% injection 12.5 g PRN    dextrose 50% injection 12.5 g PRN    dextrose 50% injection 25 g PRN    dextrose 50% injection 25 g PRN    docusate sodium capsule 100 mg BID    epoetin marisol-epbx injection 10,000 Units Every Mon, Wed, Fri    ezetimibe tablet 10 mg Daily    finasteride tablet 5 mg Daily    glucagon (human recombinant) injection 1 mg PRN    glucagon (human recombinant) injection 1 mg PRN    glucose chewable tablet 16 g PRN    glucose chewable tablet 16 g PRN    glucose chewable tablet 24 g PRN    glucose chewable tablet 24 g PRN    HYDROcodone-acetaminophen 5-325 mg per tablet 1 tablet Q6H PRN    insulin aspart U-100 pen 0-5 Units QID (AC + HS) PRN    naloxone 0.4 mg/mL injection 0.02 mg PRN    ondansetron injection 4 mg Q6H PRN    oxyCODONE immediate release tablet 10 mg Q6H PRN    pantoprazole EC tablet 40 mg BID    polyethylene glycol packet 17 g BID    sevelamer carbonate tablet 800 mg TID WM    sodium chloride 0.9% flush 10 mL Q12H PRN    sodium chloride 0.9% flush 2 mL PRN    tamsulosin 24 hr capsule 0.8 mg Daily    vitamin renal formula (B-complex-vitamin c-folic acid) 1 mg per capsule 1 capsule Daily       Objective:     Vital Signs (Most Recent):  Temp: 98 °F (36.7 °C) (02/27/25 0732)  Pulse: 77 (02/27/25 0732)  Resp: 18 (02/27/25 0732)  BP: (!) 143/80 (02/27/25 0732)  SpO2: 100 % (02/27/25 0732) Vital Signs (24h Range):  Temp:  [98 °F  (36.7 °C)-99.3 °F (37.4 °C)] 98 °F (36.7 °C)  Pulse:  [] 77  Resp:  [18] 18  SpO2:  [97 %-100 %] 100 %  BP: (106-143)/(57-80) 143/80     Weight: 72.9 kg (160 lb 11.5 oz) (02/25/25 0700)  Body mass index is 24.44 kg/m².  Body surface area is 1.87 meters squared.    I/O last 3 completed shifts:  In: 720 [P.O.:720]  Out: 2500 [Other:2500]     Physical Exam  Vitals and nursing note reviewed.   Constitutional:       Appearance: Normal appearance.   HENT:      Head: Normocephalic.      Nose: Nose normal.      Mouth/Throat:      Mouth: Mucous membranes are moist.   Eyes:      Extraocular Movements: Extraocular movements intact.      Conjunctiva/sclera: Conjunctivae normal.      Pupils: Pupils are equal, round, and reactive to light.   Cardiovascular:      Rate and Rhythm: Normal rate.   Pulmonary:      Effort: Pulmonary effort is normal.      Breath sounds: Normal breath sounds.   Abdominal:      Palpations: Abdomen is soft.   Musculoskeletal:      Cervical back: Normal range of motion.      Right lower leg: No edema.      Left lower leg: No edema.   Skin:     General: Skin is warm.   Neurological:      General: No focal deficit present.      Mental Status: He is alert.          Significant Labs:  All labs within the past 24 hours have been reviewed.  None     Significant Imaging:

## 2025-02-27 NOTE — PROGRESS NOTES
[facility]  Nephrology  Progress Note    Patient Name: Miguel Moore  MRN: [id number]  Admission Date: 2/15/2025  Hospital Length of Stay: 12 days  Attending Provider: Andres Peacock MD   Primary Care Physician: Raciel Raymond MD  Principal Problem:Closed fracture of right hip    Subjective:     HPI: This is a 69 y.o. M with a PMHx of MI, seizures, T2DM, HTN, DVT , ESRD on HD presented to ED 2/15/24 via EMS with traumatic right hip pain s/p fall. Right hip xray + femoral neck fracture. Pt admitted for surgical management. Nephrology consulted for hemodialysis. Plan for right Primary Total Hip Arthroplasty 2/16/25    Prior records obtained and reviewed.  last HD session 2/14/25  Attending Nephrologist: CLEMENTE SHAHID MD  Dialysis Location: [facility]  Schedule: M-W-F   Duration 3.5hrs  EDW 70.2kg  LUE AVF    Interval History: UF 2.5L. NAEON. Normal appetite. No complaints. SNF confirmed at [facility], awaiting discharge.    Review of patient's allergies indicates:   Allergen Reactions    Ace inhibitors      Hyperkalemia 8/2018  Other reaction(s): Unknown    Penicillins Hives     Tolerated cefepime and cefdinir previously    Tizanidine      Felt hot     Current Facility-Administered Medications   Medication Frequency    acetaminophen tablet 650 mg Q4H PRN    apixaban tablet 5 mg BID    aspirin chewable tablet 81 mg Daily    atorvastatin tablet 80 mg Daily    dextrose 50% injection 12.5 g PRN    dextrose 50% injection 12.5 g PRN    dextrose 50% injection 25 g PRN    dextrose 50% injection 25 g PRN    docusate sodium capsule 100 mg BID    epoetin marisol-epbx injection 10,000 Units Every Mon, Wed, Fri    ezetimibe tablet 10 mg Daily    finasteride tablet 5 mg Daily    glucagon (human recombinant) injection 1 mg PRN    glucagon (human recombinant) injection 1 mg PRN    glucose chewable tablet 16 g PRN    glucose chewable tablet 16 g PRN    glucose chewable tablet 24 g PRN    glucose chewable tablet  24 g PRN    HYDROcodone-acetaminophen 5-325 mg per tablet 1 tablet Q6H PRN    insulin aspart U-100 pen 0-5 Units QID (AC + HS) PRN    naloxone 0.4 mg/mL injection 0.02 mg PRN    ondansetron injection 4 mg Q6H PRN    oxyCODONE immediate release tablet 10 mg Q6H PRN    pantoprazole EC tablet 40 mg BID    polyethylene glycol packet 17 g BID    sevelamer carbonate tablet 800 mg TID WM    sodium chloride 0.9% flush 10 mL Q12H PRN    sodium chloride 0.9% flush 2 mL PRN    tamsulosin 24 hr capsule 0.8 mg Daily    vitamin renal formula (B-complex-vitamin c-folic acid) 1 mg per capsule 1 capsule Daily       Objective:     Vital Signs (Most Recent):  Temp: 98 °F (36.7 °C) (02/27/25 0732)  Pulse: 77 (02/27/25 0732)  Resp: 18 (02/27/25 0732)  BP: (!) 143/80 (02/27/25 0732)  SpO2: 100 % (02/27/25 0732) Vital Signs (24h Range):  Temp:  [98 °F (36.7 °C)-99.3 °F (37.4 °C)] 98 °F (36.7 °C)  Pulse:  [] 77  Resp:  [18] 18  SpO2:  [97 %-100 %] 100 %  BP: (106-143)/(57-80) 143/80     Weight: 72.9 kg (160 lb 11.5 oz) (02/25/25 0700)  Body mass index is 24.44 kg/m².  Body surface area is 1.87 meters squared.    I/O last 3 completed shifts:  In: 720 [P.O.:720]  Out: 2500 [Other:2500]     Physical Exam  Vitals and nursing note reviewed.   Constitutional:       Appearance: Normal appearance.   HENT:      Head: Normocephalic.      Nose: Nose normal.      Mouth/Throat:      Mouth: Mucous membranes are moist.   Eyes:      Extraocular Movements: Extraocular movements intact.      Conjunctiva/sclera: Conjunctivae normal.      Pupils: Pupils are equal, round, and reactive to light.   Cardiovascular:      Rate and Rhythm: Normal rate.   Pulmonary:      Effort: Pulmonary effort is normal.      Breath sounds: Normal breath sounds.   Abdominal:      Palpations: Abdomen is soft.   Musculoskeletal:      Cervical back: Normal range of motion.      Right lower leg: No edema.      Left lower leg: No edema.   Skin:     General: Skin is warm.    Neurological:      General: No focal deficit present.      Mental Status: He is alert.          Significant Labs:  All labs within the past 24 hours have been reviewed.  None     Significant Imaging:    Assessment/Plan:     ESRD  - HD MWF;   - HD yesterday, UF 2.5L  - labs MWF     Anemia of CKD  - hgb 7.1 yesterday, below goal  - continue PERRY with HD  - labs MWF     Secondary HPTH   - CCa 9.7 yesterday, acceptable  - phos 4 yesterday, controlled; continue sevelamer.  - labs MWF     Hyponatremia   - Na 131 yesterday;   - HD as above      Thank you for your consult. I will follow-up with patient. Please contact us if you have any additional questions.    HANNAH Huynh  Nephrology  [facility] - Med Surg

## 2025-02-28 NOTE — NURSING
Franko  here to transport pt to [facility],pt was assisted to w/c without injury,pt now leaving unit at 6:55pm,NADN,safety maintined.

## 2025-03-11 ENCOUNTER — APPOINTMENT (OUTPATIENT)
Dept: RADIOLOGY | Facility: HOSPITAL | Age: 70
End: 2025-03-11
Attending: ORTHOPAEDIC SURGERY
Payer: MEDICARE

## 2025-03-11 ENCOUNTER — OFFICE VISIT (OUTPATIENT)
Dept: ORTHOPEDICS | Facility: CLINIC | Age: 70
End: 2025-03-11
Payer: MEDICARE

## 2025-03-11 ENCOUNTER — TELEPHONE (OUTPATIENT)
Dept: VASCULAR SURGERY | Facility: CLINIC | Age: 70
End: 2025-03-11
Payer: MEDICARE

## 2025-03-11 VITALS — HEIGHT: 68 IN | BODY MASS INDEX: 24.35 KG/M2 | WEIGHT: 160.69 LBS

## 2025-03-11 DIAGNOSIS — Z96.641 STATUS POST RIGHT HIP REPLACEMENT: ICD-10-CM

## 2025-03-11 DIAGNOSIS — Z96.641 STATUS POST RIGHT HIP REPLACEMENT: Primary | ICD-10-CM

## 2025-03-11 PROCEDURE — 3288F FALL RISK ASSESSMENT DOCD: CPT | Mod: HCNC,CPTII,S$GLB, | Performed by: ORTHOPAEDIC SURGERY

## 2025-03-11 PROCEDURE — 99999 PR PBB SHADOW E&M-EST. PATIENT-LVL III: CPT | Mod: PBBFAC,HCNC,, | Performed by: ORTHOPAEDIC SURGERY

## 2025-03-11 PROCEDURE — 73502 X-RAY EXAM HIP UNI 2-3 VIEWS: CPT | Mod: 26,HCNC,RT, | Performed by: RADIOLOGY

## 2025-03-11 PROCEDURE — 73502 X-RAY EXAM HIP UNI 2-3 VIEWS: CPT | Mod: TC,HCNC,FY,PN,RT

## 2025-03-11 PROCEDURE — 1159F MED LIST DOCD IN RCRD: CPT | Mod: HCNC,CPTII,S$GLB, | Performed by: ORTHOPAEDIC SURGERY

## 2025-03-11 PROCEDURE — 3066F NEPHROPATHY DOC TX: CPT | Mod: HCNC,CPTII,S$GLB, | Performed by: ORTHOPAEDIC SURGERY

## 2025-03-11 PROCEDURE — 1157F ADVNC CARE PLAN IN RCRD: CPT | Mod: HCNC,CPTII,S$GLB, | Performed by: ORTHOPAEDIC SURGERY

## 2025-03-11 PROCEDURE — 1101F PT FALLS ASSESS-DOCD LE1/YR: CPT | Mod: HCNC,CPTII,S$GLB, | Performed by: ORTHOPAEDIC SURGERY

## 2025-03-11 PROCEDURE — 1125F AMNT PAIN NOTED PAIN PRSNT: CPT | Mod: HCNC,CPTII,S$GLB, | Performed by: ORTHOPAEDIC SURGERY

## 2025-03-11 PROCEDURE — 99024 POSTOP FOLLOW-UP VISIT: CPT | Mod: HCNC,S$GLB,, | Performed by: ORTHOPAEDIC SURGERY

## 2025-03-11 NOTE — TELEPHONE ENCOUNTER
----- Message from Zuri sent at 3/10/2025  4:32 PM CDT -----   Name of Who is Calling: What is the request in detail:  request call back in reference to reschedule appointment due to patient go to dialysis on Monday Wednesday and Friday  / caller want to know if can schedule patient on Tuesday or Thursday  morning time  Please contact to further discuss and advise  Can the clinic reply by [facility]: What Number to Call Back if not [facility]:  lesli / [phone] / [facility] [place]

## 2025-03-11 NOTE — PROGRESS NOTES
Ortho Hip Follow Up Note    PCP: Raciel Raymond MD   Referring Provider: No referring provider defined for this encounter.     Assessment:  69 y.o. male status post right Total Hip Primary completed on 2/17/25 for femoral neck fracture.  Patient continues to reside at nursing home.  But overall he is doing fairly well at this stage.  He is able to ambulate with the use of a cane about 20 ft.  Using a wheelchair for longer distances.  His pain is reasonably controlled.  He has been seen in wound care for his feet.    Plan:  Follow up 3 months   Future Radiographs Indicated at next visit: No    Pain Management: Continue current pain management and Wean narcotic medications as tolerated  Anticoagulation: Resume home anticoagulation previously managed by another provider  Wound Care: Ok to shower. No scrubbing incision. No soaking in pools or tubs for 6 weeks post operative.     Patient Reassurance:   Post-operative course discussed with patient. Patient reassured and supported. All questions answered.    ACTIVE PROBLEM LIST  Problem List[1]      HPI:  Miguel Moore presents today for a post-op visit.    STATUS POST:  right Total Hip Primary  BMI: There is no height or weight on file to calculate BMI.    Post operative recovery was complicated by: None    Patient rates his condition as improving. Pleased with surgical outcome to date.     Functional Assessment:  EVIE  Functional Difficulties:  Arising from chair and Walking  Pain Medication:  Non-Narcotic  Anticoagulation: Eloquis    EXAM: POST OP HIP    Right Lower Extremity:    Dorsalis pedis pulses palpable     Dorsi flexion 1/5 strength, same as pre-op    Plantar flexion 1/5 strength, same as pre-op     Extensor hallucis extension: 1/5 strength, same as pre-op    Sensory intact to light touch L1-S1    Dressing clean/dry/intact    Hip Imaging:  Implants are well fixed. There is no evidence of loosening. No dislocation.   Estimated Anteversion: 25  Estimate  Abduction: 42  Estimated LLD: 2mm              [1]   Patient Active Problem List  Diagnosis    Benign essential HTN    Dyslipidemia    BPH (benign prostatic hyperplasia) 2/18/21 prostate bx normal    Seizure disorder as sequela of cerebrovascular accident    Hemiplegia and hemiparesis following cerebral infarction affecting right dominant side    Microcytosis 9/2019 labs c/w AoCD and AoCKD    ESRD (end stage renal disease)    Nuclear sclerosis, left    Cortical cataract of both eyes    DM type 2 without retinopathy    Secondary hyperparathyroidism of renal origin    Vitamin D deficiency    Right sided weakness    Senile nuclear sclerosis    CME (cystoid macular edema), bilateral    Refractive error    History of fungal infection candida parasilosis hospitalization 8/2020    Chronic deep vein thrombosis (DVT) of popliteal vein of right lower extremity 9/21/20 outside [place]; unprovoked; anticoagulation    Debility    Pulmonary nodule 1/2021 4 mm nodule.    Elevated PSA 2/18/21 prostate bx normal    Anemia in chronic kidney disease    History of diabetic ulcer of foot    Pseudophakia    Bilateral posterior capsular opacification    Paroxysmal atrial fibrillation    Peripheral artery disease 5/16/24 LE angio with angioplasty popliteal artery    Abdominal aortic atherosclerosis on CT 4/1/24    Upper GI bleed 8/23/24 coil embolization L gastric artery    Wounds, multiple    Acute blood loss anemia (ABLA)    Closed fracture of right hip    Slow transit constipation

## 2025-03-11 NOTE — TELEPHONE ENCOUNTER
Called and spoke with pt. States he fell and fracture his hip and currently at rehab. Appt.with vascular provider rescheduled with ultrasound test prior.  Appt. reminder letter mailed to pt.per pt. request. Verified address prior to mailing.

## 2025-04-07 NOTE — PATIENT INSTRUCTIONS
Complete xray of finger.     F/u with rheumatology, referral placed.     Continue dialysis. Monitor for s/s of infection. Fever, increased pain, change in color to finger/discharge. Pending xray will start oral antibiotic.    [email or web address]  URL: [email or web address]  33 [phone])  Gait Training (44440)  Neuromuscular Re-Education (91516)  Therapeutic Exercise (20544)  Heat/Cold (70088)  Manual Therapy (90156)  Therapeutic Activity (78591)  Electrical Stimulation Attended (99082)  Aquatic Therapy (59527)  Performance Test or Measure (45636)    

## 2025-05-06 ENCOUNTER — OFFICE VISIT (OUTPATIENT)
Dept: FAMILY MEDICINE | Facility: CLINIC | Age: 70
End: 2025-05-06
Payer: MEDICARE

## 2025-05-06 ENCOUNTER — OFFICE VISIT (OUTPATIENT)
Dept: PODIATRY | Facility: CLINIC | Age: 70
End: 2025-05-06
Payer: MEDICARE

## 2025-05-06 VITALS
HEIGHT: 68 IN | TEMPERATURE: 98 F | BODY MASS INDEX: 23.93 KG/M2 | SYSTOLIC BLOOD PRESSURE: 110 MMHG | DIASTOLIC BLOOD PRESSURE: 80 MMHG | HEART RATE: 71 BPM | OXYGEN SATURATION: 99 % | WEIGHT: 157.88 LBS

## 2025-05-06 VITALS — WEIGHT: 160.69 LBS | HEIGHT: 68 IN | BODY MASS INDEX: 24.35 KG/M2

## 2025-05-06 DIAGNOSIS — L60.0 INGROWN NAIL: Primary | ICD-10-CM

## 2025-05-06 DIAGNOSIS — E78.5 DYSLIPIDEMIA: Chronic | ICD-10-CM

## 2025-05-06 DIAGNOSIS — N40.0 BENIGN PROSTATIC HYPERPLASIA, UNSPECIFIED WHETHER LOWER URINARY TRACT SYMPTOMS PRESENT: Chronic | ICD-10-CM

## 2025-05-06 DIAGNOSIS — I48.0 PAROXYSMAL ATRIAL FIBRILLATION: Chronic | ICD-10-CM

## 2025-05-06 DIAGNOSIS — I10 BENIGN ESSENTIAL HTN: Chronic | ICD-10-CM

## 2025-05-06 DIAGNOSIS — L03.032 PARONYCHIA OF TOE OF LEFT FOOT: ICD-10-CM

## 2025-05-06 DIAGNOSIS — R53.81 DEBILITY: ICD-10-CM

## 2025-05-06 DIAGNOSIS — Z00.00 ROUTINE MEDICAL EXAM: Primary | ICD-10-CM

## 2025-05-06 DIAGNOSIS — I69.351 HEMIPLEGIA AND HEMIPARESIS FOLLOWING CEREBRAL INFARCTION AFFECTING RIGHT DOMINANT SIDE: Chronic | ICD-10-CM

## 2025-05-06 DIAGNOSIS — N40.0 BENIGN NON-NODULAR PROSTATIC HYPERPLASIA WITHOUT LOWER URINARY TRACT SYMPTOMS: Chronic | ICD-10-CM

## 2025-05-06 DIAGNOSIS — L89.321 PRESSURE ULCER OF LEFT BUTTOCK, STAGE 1: ICD-10-CM

## 2025-05-06 DIAGNOSIS — K92.2 UPPER GI BLEED: ICD-10-CM

## 2025-05-06 DIAGNOSIS — N18.6 ESRD (END STAGE RENAL DISEASE): Chronic | ICD-10-CM

## 2025-05-06 DIAGNOSIS — E11.9 DM TYPE 2 WITHOUT RETINOPATHY: ICD-10-CM

## 2025-05-06 DIAGNOSIS — I82.531 CHRONIC DEEP VEIN THROMBOSIS (DVT) OF POPLITEAL VEIN OF RIGHT LOWER EXTREMITY: ICD-10-CM

## 2025-05-06 PROCEDURE — 1157F ADVNC CARE PLAN IN RCRD: CPT | Mod: CPTII,HCNC,S$GLB, | Performed by: PODIATRIST

## 2025-05-06 PROCEDURE — 1101F PT FALLS ASSESS-DOCD LE1/YR: CPT | Mod: CPTII,HCNC,S$GLB, | Performed by: PODIATRIST

## 2025-05-06 PROCEDURE — 3066F NEPHROPATHY DOC TX: CPT | Mod: CPTII,HCNC,S$GLB, | Performed by: PODIATRIST

## 2025-05-06 PROCEDURE — 99214 OFFICE O/P EST MOD 30 MIN: CPT | Mod: HCNC,S$GLB,, | Performed by: PODIATRIST

## 2025-05-06 PROCEDURE — 99999 PR PBB SHADOW E&M-EST. PATIENT-LVL III: CPT | Mod: PBBFAC,HCNC,, | Performed by: PODIATRIST

## 2025-05-06 PROCEDURE — 3044F HG A1C LEVEL LT 7.0%: CPT | Mod: CPTII,HCNC,S$GLB, | Performed by: PODIATRIST

## 2025-05-06 PROCEDURE — 3008F BODY MASS INDEX DOCD: CPT | Mod: CPTII,HCNC,S$GLB, | Performed by: PODIATRIST

## 2025-05-06 PROCEDURE — 99999 PR PBB SHADOW E&M-EST. PATIENT-LVL V: CPT | Mod: PBBFAC,HCNC,,

## 2025-05-06 PROCEDURE — 1126F AMNT PAIN NOTED NONE PRSNT: CPT | Mod: CPTII,HCNC,S$GLB, | Performed by: PODIATRIST

## 2025-05-06 PROCEDURE — 3288F FALL RISK ASSESSMENT DOCD: CPT | Mod: CPTII,HCNC,S$GLB, | Performed by: PODIATRIST

## 2025-05-06 RX ORDER — DOXYCYCLINE 100 MG/1
100 CAPSULE ORAL EVERY 12 HOURS
Qty: 20 CAPSULE | Refills: 0 | Status: SHIPPED | OUTPATIENT
Start: 2025-05-06 | End: 2025-05-06

## 2025-05-06 RX ORDER — TAMSULOSIN HYDROCHLORIDE 0.4 MG/1
2 CAPSULE ORAL DAILY
Qty: 180 CAPSULE | Refills: 3 | Status: SHIPPED | OUTPATIENT
Start: 2025-05-06

## 2025-05-06 RX ORDER — SEVELAMER CARBONATE 800 MG/1
800 TABLET, FILM COATED ORAL
Qty: 90 TABLET | Refills: 3 | Status: SHIPPED | OUTPATIENT
Start: 2025-05-06

## 2025-05-06 RX ORDER — TOBRAMYCIN 3 MG/ML
1 SOLUTION/ DROPS OPHTHALMIC 2 TIMES DAILY
Qty: 5 ML | Refills: 0 | Status: SHIPPED | OUTPATIENT
Start: 2025-05-06 | End: 2025-05-06

## 2025-05-06 NOTE — PATIENT INSTRUCTIONS
"Thank you for seeing me today.    Physical Therapy ---  [phone]    [facility] "DME" should reach out and/or send you the gel cushion pad for your wheelchair - please reach out in 1-2 weeks if you don't hear or receive anything from them and Dr. Raymond and I will work to get this taken care of.    Please don't hesitate to seek emergency care if you develop any new or worsening symptoms.    "

## 2025-05-06 NOTE — PROGRESS NOTES
Subjective:     Patient ID: Miguel Moore is a 69 y.o. male.    Chief Complaint: Ingrown Toenail (Left great toe), Nail Care, and Diabetic Foot Exam (5/8/25 upcoming appt Dr Raymond)    Miguel is a 69 y.o. male who presents to the clinic for evaluation and treatment of high risk feet. Miguel has a past medical history of Allergy, Clotting disorder, Deep vein thrombosis, Diabetes mellitus, type 2, Hypertension, Nuclear sclerosis of both eyes (06/09/2017), Renal disorder, Seizures, Stroke, and Urinary tract infection. The patient's chief complaint is long, thick toenails. This patient has documented high risk feet requiring routine maintenance secondary to peripheral neuropathy.    CC#2- Reports ingrown nail left great toe x several days reports noticing drainage to area.     PCP: Raciel Raymond MD    Date Last Seen by PCP: per above    Current shoe gear:  Affected Foot: Tennis shoes     Unaffected Foot: Tennis shoes    Hemoglobin A1C   Date Value Ref Range Status   04/04/2025 6.1 (H) 4.8 - 5.9 % Final   03/17/2025 5.8 4.8 - 5.9 % Final   12/09/2024 5.8 4.8 - 5.9 % Final   03/20/2024 5.8 (H) 4.0 - 5.6 % Final     Comment:     ADA Screening Guidelines:  5.7-6.4%  Consistent with prediabetes  >or=6.5%  Consistent with diabetes    High levels of fetal hemoglobin interfere with the HbA1C  assay. Heterozygous hemoglobin variants (HbS, HgC, etc)do  not significantly interfere with this assay.   However, presence of multiple variants may affect accuracy.     10/26/2023 5.9 (H) 4.0 - 5.6 % Final     Comment:     ADA Screening Guidelines:  5.7-6.4%  Consistent with prediabetes  >or=6.5%  Consistent with diabetes    High levels of fetal hemoglobin interfere with the HbA1C  assay. Heterozygous hemoglobin variants (HbS, HgC, etc)do  not significantly interfere with this assay.   However, presence of multiple variants may affect accuracy.     04/27/2023 5.9 (H) 4.0 - 5.6 % Final     Comment:     ADA Screening  Guidelines:  5.7-6.4%  Consistent with prediabetes  >or=6.5%  Consistent with diabetes    High levels of fetal hemoglobin interfere with the HbA1C  assay. Heterozygous hemoglobin variants (HbS, HgC, etc)do  not significantly interfere with this assay.   However, presence of multiple variants may affect accuracy.         Review of Systems   Constitutional: Negative for chills.   Cardiovascular:  Negative for chest pain and claudication.   Respiratory:  Negative for cough.    Skin:  Positive for color change, dry skin and nail changes.   Musculoskeletal:  Positive for joint pain.   Gastrointestinal:  Negative for nausea.   Neurological:  Positive for paresthesias. Negative for numbness.   Psychiatric/Behavioral:  The patient is not nervous/anxious.         Objective:     Physical Exam  Constitutional:       Appearance: He is well-developed.   Cardiovascular:      Comments: Dorsalis pedis and posterior tibial pulses are diminished Bilaterally. Toes are cool to touch. Feet are warm proximally.There is decreased digital hair. Skin is atrophic, slightly hyperpigmented, and mildly edematous   Pulmonary:      Effort: No respiratory distress.   Musculoskeletal:         General: Tenderness present.      Comments: Adequate joint range of motion without pain, limitation, nor crepitation Bilateral feet and ankle joints. Muscle strength is 5/5 in all groups bilaterally.   Decreased stride, station of gait.  apropulsive toe off.  Increased angle and base of gait.      Patient has hammertoes of digits 2-5 bilateral partially reducible without symptom today.     Visible and palpable bunion without pain at dorsomedial 1st metatarsal head right and left.  Hallux abducted right and left partially reducible, tracks laterally without being track bound.  No ecchymosis, erythema, edema, or cardinal signs infection or signs of trauma same foot.         Feet:      Right foot:      Skin integrity: Callus and dry skin present. No ulcer or skin  breakdown.      Left foot:      Skin integrity: Dry skin present. No ulcer.   Skin:     General: Skin is dry.      Findings: No erythema.      Comments: Nails x10 are elongated by 2-6mm's, thickened by 3-5 mm's, dystrophic, and are darkened in coloration . Xerosis Bilaterally. No open lesions noted     Lateral  hallux nail margin of left  foot with ingrown nail plate. Surrounding erythema and minimal edema is noted.          Neurological:      Mental Status: He is alert.      Comments: Waka-Alex 5.07 monofilamant testing is diminished Desmond feet. Sharp/dull sensation diminished Bilaterally. Light touch absent Bilaterally.            Assessment:      Encounter Diagnoses   Name Primary?    Ingrown nail Yes    Paronychia of toe of left foot              Plan:     Miguel was seen today for ingrown toenail, nail care and diabetic foot exam.    Diagnoses and all orders for this visit:    Ingrown nail    Paronychia of toe of left foot    Other orders  -     doxycycline (VIBRAMYCIN) 100 MG Cap; Take 1 capsule (100 mg total) by mouth every 12 (twelve) hours.  -     tobramycin sulfate 0.3% (TOBREX) 0.3 % ophthalmic solution; Place 1 drop into both eyes 2 (two) times a day. (Patient not taking: Reported on 5/6/2025)              I counseled the patient on his conditions, their implications and medical management.    - Shoe inspection. Diabetic Foot Education. Patient reminded of the importance of good nutrition and blood sugar control to help prevent podiatric complications of diabetes. Patient instructed on proper foot hygeine. We discussed wearing proper shoe gear, daily foot inspections, never walking without protective shoe gear, never putting sharp instruments to feet, routine podiatric nail visits every 2-3 months.   - With patient's permission, nails were aggressively reduced and debrided x 10 to their soft tissue attachment mechanically and with electric , removing all offending nail and debris. Patient  relates relief following the procedure. He will continue to monitor the areas daily, inspect his feet, wear protective shoe gear when ambulatory, moisturizer to maintain skin integrity and follow in this office in approximately 2-3 months, sooner p.r.n.     CC#2- Ingrown nail    Discussed treatment options with patient. Options included soaking, avulsion and matrixectomy. Risks and benefits discussed and all questions were answered. The patient wishes to proceed with  Nail avulsion at a later date      In depth conversation on the treatment of ingrown nail; partial nail avulsion vs chemical matrixectomy vs conservative treatment of soaking and nail trimming    Utilizing sterile toenail clippers I aggressively trimmed  the offending left hallux lateral   nail border approximately 3 mm from its edge and carried the nail plate incision down at an angle in order to wedge out the offending cryptotic portion of the nail plate. The offending border was then removed in total.  No blood was drawn. Patient tolerated the procedure well and related significant relief.    Rx. Augmentin    Rx. Tobrex drops BID    Already followed by home wound care.    RTC PRN

## 2025-05-06 NOTE — ASSESSMENT & PLAN NOTE
Has been taking Eliquis without any melena or hematochezia.  Still taking Protonix 40 BID.  H/H stable per HD labs.  Will continue to monitor.

## 2025-05-06 NOTE — ASSESSMENT & PLAN NOTE
Last saw PCP 11/2024.  We will perform routine medical exam today and schedule six-month follow up with PCP for annual exam

## 2025-05-06 NOTE — ASSESSMENT & PLAN NOTE
A1c hovering around 6 for years.  Diet-controlled.  Continue renal/DM diet.  No indication for further intervention at this time.

## 2025-05-06 NOTE — PROGRESS NOTES
Assessment & Plan     Routine medical exam  Last saw PCP 11/2024.  We will perform routine medical exam today and schedule six-month follow up with PCP for annual exam      ESRD (end stage renal disease)  HD MWF at [facility] in [place].  Last dialyzed yesterday.  Has left AVF, denies any issues.    Continue to renally dose meds and avoid any nephrotoxic medications.   Orders:  -     RENVELA 800 mg Tab; Take 1 tablet (800 mg total) by mouth 3 (three) times daily with meals.  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  Diet-controlled.  Continue renal/DM diet.  No indication for further intervention at this time.       Benign essential HTN  Stable.  BP today is 110/80.  Will continue to monitor.        Dyslipidemia  Doing well on Lipitor 80, most recent lipid panel 6 months ago wnl.  Will continue to monitor.       Benign prostatic hyperplasia, unspecified whether lower urinary tract symptoms present  Doing well on finasteride, Flomax.  Denies straining or hematuria.  Will continue to monitor.       Upper GI bleed 8/23/24 coil embolization L gastric artery  Has been taking Eliquis without any melena or hematochezia.  Still taking Protonix 40 BID.  H/H stable per HD labs.  Will continue to monitor.        Paroxysmal atrial fibrillation  Chronic deep vein thrombosis (DVT) of popliteal vein of right lower extremity 9/21/20 outside [place]; unprovoked; anticoagulation  Denies any chest pain, palpitations, shortness of breath, or new or unusual leg pain or swelling.  Also denies melena or hematochezia as above.  Eliquis refilled at 1 year supply as below  Orders:  -     apixaban (ELIQUIS) 5 mg Tab; Take 1 tablet (5 mg total) by mouth 2 (two) times daily.  Dispense: 180 tablet; Refill: 3      Hemiplegia and hemiparesis following cerebral infarction affecting right dominant side  -     Ambulatory Referral/Consult to Physical Therapy; Future; Expected date:  05/13/2025    Debility  Pressure ulcer of left buttock, stage 1  -     WHEELCHAIR GEL CUSHION FOR HOME USE    Benign non-nodular prostatic hyperplasia without lower urinary tract symptoms  -     tamsulosin (FLOMAX) 0.4 mg Cap; Take 2 capsules (0.8 mg total) by mouth once daily.  Dispense: 180 capsule; Refill: 3    Patient has multiple chronic conditions absolutely necessitate home health & physical therapy per the above.       HPI   Miguel Moore is a 69 y.o. male with multiple medical diagnoses as listed in the medical history and problem list who presents for routine medical exam for ESRD, T2DM,, and for various acute & chronic conditions as detailed above.    ROS:  Patient denies any CP, SOB, abdominal pain, fever, chills, N/V    Follow Up with PCP in 6 months with PCP                      Health Maintenance         Date Due Completion Date    RSV Vaccine (Age 60+ and Pregnant patients) (1 - Risk 60-74 years 1-dose series) Never done ---    Shingles Vaccine (2 of 2) 09/25/2020 7/31/2020    Diabetic Eye Exam 05/02/2024 5/2/2023    COVID-19 Vaccine (7 - 2024-25 season) 09/01/2024 11/10/2021    Colorectal Cancer Screening 10/26/2024 10/26/2023    Hemoglobin A1c 10/04/2025 4/4/2025    Lipid Panel 11/07/2025 11/7/2024    Foot Exam 05/06/2026 5/6/2025 (Done)    Override on 5/6/2025: Done    Override on 3/7/2024: Done    Override on 2/14/2023: Done    Override on 12/7/2021: Done    Override on 1/9/2020: Done    Override on 4/24/2019: Done    Override on 1/15/2018: Done    Override on 4/3/2017: Done (Gabriella Manjarrez/Podiatry)    TETANUS VACCINE 05/08/2027 5/8/2017                 Physical Exam   Vital signs reviewed.   Body mass index is 24 kg/m².  General:  Well-developed, well-nourished. NAD.   Skin:  Warm, dry.  No rashes or lesions noted.  No palmar erythema.  Cap refill <2s bilaterally  Head:  NC/AT   Eyes:  Conjunctivae w/o exudates or hemorrhage.  Non-icteric sclerae.  Ears:  External ears w/o swelling or  erythema.  Neck:  Trachea is midline.  No carotid bruit appreciated.  No cervical adenopathy appreciated.    Lungs:  CTAB without rales, rhonchi or wheezing.   Breathing comfortably on RA.  Heart:  Normal S1 & S2.  No extra heart sounds.   No pulsus alternans.  Abdomen:  Symmetric, non-distended, non-tender  Extremities:  Radial pulses 2+ and symmetric.   strength 5/5 on the left  Neuro:  A&O4.  Exam is grossly consistent with right-sided hemiparesis  Psychiatric:  Appropriate affect.          History     Past Medical History:  Past Medical History:   Diagnosis Date    Allergy     Clotting disorder     Eliquis and Plavix    Deep vein thrombosis     Diabetes mellitus, type 2     Encounter for annual health examination 5/6/2025    Hypertension     Nuclear sclerosis of both eyes 06/09/2017    Renal disorder     Seizures     Stroke     Urinary tract infection        Past Surgical History:  Past Surgical History:   Procedure Laterality Date    CATARACT EXTRACTION W/  INTRAOCULAR LENS IMPLANT Right 10/05/2017    Dr. Tay    CATARACT EXTRACTION W/  INTRAOCULAR LENS IMPLANT Left 10/19/2017    Dr. Tay    CYSTOSCOPY W/ RETROGRADES Bilateral 2/18/2021    Procedure: CYSTOSCOPY, WITH RETROGRADE PYELOGRAM;  Surgeon: JEMMA Styles MD;  Location: [facility];  Service: Urology;  Laterality: Bilateral;  REQUESTING EARLY AS POSSIBE-LO / 2/8/2021 @ 1:13PM  RN Pre Op 2-11-21, Covid NEGATIVE ON  2-17-21.  C A    ESOPHAGOGASTRODUODENOSCOPY N/A 8/17/2020    Procedure: EGD (ESOPHAGOGASTRODUODENOSCOPY);  Surgeon: Desmond Chapman MD;  Location: [facility];  Service: Endoscopy;  Laterality: N/A;    ESOPHAGOGASTRODUODENOSCOPY N/A 8/21/2024    Procedure: EGD (ESOPHAGOGASTRODUODENOSCOPY);  Surgeon: Tonie Chauhan MD;  Location: [facility] ENDO;  Service: Endoscopy;  Laterality: N/A;    ESOPHAGOGASTRODUODENOSCOPY N/A 12/5/2024    Procedure: EGD (ESOPHAGOGASTRODUODENOSCOPY);  Surgeon: Tonie Chauhan MD;  Location: [facility];   Service: Endoscopy;  Laterality: N/A;  Bren Ovalles, standard prep, mailed , Eliquis, Dialysis LAB, ASam  ok to hold Eliquis 2 days per Dr Raymond-GT  11/8/24- pc complete. DBM  11/13 pt r/s, Eliquis hold x 2 days per Dr. Raymond see t/e 10/29/24, Dialysis MWF, K+ labs in AM, mailed -ml  11/27 precall com    EYE SURGERY Bilateral     cataract    FEMORAL ARTERY STENT      FRACTURE SURGERY      INCISION AND DRAINAGE, LOWER EXTREMITY Right 4/4/2024    Procedure: INCISION AND DRAINAGE, LOWER EXTREMITY;  Surgeon: Jimbo Montilla III, MD;  Location: [facility] OR;  Service: Orthopedics;  Laterality: Right;    INCISION AND DRAINAGE, LOWER EXTREMITY Right 4/6/2024    Procedure: INCISION AND DRAINAGE, LOWER EXTREMITY;  Surgeon: Desmond Barillas MD;  Location: [facility] OR;  Service: Orthopedics;  Laterality: Right;  foot and ankle    INCISION AND DRAINAGE, LOWER EXTREMITY Right 4/9/2024    Procedure: INCISION AND DRAINAGE, LOWER EXTREMITY- FOOT & ANKLE;  Surgeon: Jimbo Montilla III, MD;  Location: [facility] OR;  Service: Orthopedics;  Laterality: Right;  CULTURES, VASCHE    INCISION AND DRAINAGE, LOWER EXTREMITY Right 5/21/2024    Procedure: INCISION AND DRAINAGE, LOWER EXTREMITY;  Surgeon: Jimbo Montilla III, MD;  Location: [facility] OR;  Service: Orthopedics;  Laterality: Right;  Pulsavac and Vashe    KNEE SURGERY      bilateral scope    TOTAL REPLACEMENT OF HIP JOINT USING COMPUTER-ASSISTED NAVIGATION Right 2/17/2025    Procedure: ARTHROPLASTY, HIP, TOTAL, USING COMPUTER-ASSISTED NAVIGATION;  Surgeon: Chris Quesada MD;  Location: [facility] OR;  Service: Orthopedics;  Laterality: Right;  Sofya. ADD ON TO FOLLOW MY CASES    WOUND DRESSING Right 4/9/2024    Procedure: WOUND VAC EXCHANGE;  Surgeon: Jimbo Montilla III, MD;  Location: [facility] OR;  Service: Orthopedics;  Laterality: Right;       Social History:  Social History[1]    Family History:  Family History   Problem Relation Name Age of Onset    Diabetes Mother      Heart disease Mother       Hypertension Mother      Cataracts Mother      No Known Problems Father      Hypertension Sister      No Known Problems Brother      No Known Problems Maternal Aunt      No Known Problems Maternal Uncle      No Known Problems Paternal Aunt      No Known Problems Paternal Uncle      No Known Problems Maternal Grandmother      No Known Problems Maternal Grandfather      No Known Problems Paternal Grandmother      No Known Problems Paternal Grandfather      No Known Problems Daughter      No Known Problems Other      Amblyopia Neg Hx      Blindness Neg Hx      Cancer Neg Hx      Glaucoma Neg Hx      Macular degeneration Neg Hx      Retinal detachment Neg Hx      Strabismus Neg Hx      Stroke Neg Hx      Thyroid disease Neg Hx         Allergies and Medications: (updated and reviewed)  Review of patient's allergies indicates:   Allergen Reactions    Ace inhibitors      Hyperkalemia 8/2018  Other reaction(s): Unknown    Penicillins Hives     Tolerated cefepime and cefdinir previously    Tizanidine      Felt hot     Current Medications[2]    Patient Care Team:  Raciel Raymond MD as PCP - General (Internal Medicine)  Gabriella Manjarrez DPM (Inactive) as Consulting Physician (Podiatry)  Mac Chambers Jr., MD as Consulting Physician (Urology)  Geovany Rucker MD as Consulting Physician (Neurology)  Bob Tay MD as Consulting Physician (Ophthalmology)  Adrian Millard MD as Consulting Physician (Nephrology)  Lizy Lange as ED Navigator        Rohit Shell PA-C  Family Medicine  [facility]         - The patient is given an After Visit Summary that lists all medications with directions, allergies, education, orders placed during this encounter and follow-up instructions.      - I have reviewed the patient's medical information including past medical, family, and social history sections including the medications and allergies.      - We discussed the patient's current  medications.     This note was created by combination of typed  and MModal dictation.  Transcription errors may be present.  If there are any questions, please contact me.                     [1]   Social History  Socioeconomic History    Marital status: Single   Occupational History    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, etc   Tobacco Use    Smoking status: Never    Smokeless tobacco: Never   Substance and Sexual Activity    Alcohol use: No    Drug use: No   Social History Narrative    Lives with daughter     Social Drivers of Health     Financial Resource Strain: Low Risk  (2/16/2025)    Overall Financial Resource Strain (CARDIA)     Difficulty of Paying Living Expenses: Not hard at all   Recent Concern: Financial Resource Strain - Medium Risk (2/15/2025)    Overall Financial Resource Strain (CARDIA)     Difficulty of Paying Living Expenses: Somewhat hard   Food Insecurity: No Food Insecurity (2/16/2025)    Hunger Vital Sign     Worried About Running Out of Food in the Last Year: Never true     Ran Out of Food in the Last Year: Never true   Recent Concern: Food Insecurity - Food Insecurity Present (2/15/2025)    Hunger Vital Sign     Worried About Running Out of Food in the Last Year: Sometimes true     Ran Out of Food in the Last Year: Sometimes true   Transportation Needs: Unmet Transportation Needs (9/10/2024)    PRAPARE - Transportation     Lack of Transportation (Medical): Yes     Lack of Transportation (Non-Medical): No   Physical Activity: Insufficiently Active (9/10/2024)    Exercise Vital Sign     Days of Exercise per Week: 4 days     Minutes of Exercise per Session: 10 min   Stress: No Stress Concern Present (2/15/2025)    [employer] [place] [employer] - Occupational Stress Questionnaire     Feeling of Stress : Not at all   Housing Stability: Low Risk  (2/16/2025)    Housing Stability Vital Sign     Unable to Pay for Housing in the Last Year: No     Homeless in  the Last Year: No   [2]   Current Outpatient Medications   Medication Sig Dispense Refill    aspirin 81 MG Chew Take 81 mg by mouth once daily.      atorvastatin (LIPITOR) 80 MG tablet Take 1 tablet (80 mg total) by mouth once daily.      ezetimibe (ZETIA) 10 mg tablet Take 1 tablet (10 mg total) by mouth once daily. Continue taking Atorvastatin 90 tablet 3    HYDROcodone-acetaminophen (NORCO) 5-325 mg per tablet Take 1 tablet by mouth every 8 (eight) hours as needed for Pain. 15 tablet 0    pantoprazole (PROTONIX) 40 MG tablet Take 1 tablet (40 mg total) by mouth 2 (two) times daily. 180 tablet 3    vitamin renal formula, B-complex-vitamin c-folic acid, (NEPHROCAP) 1 mg Cap Take 1 capsule by mouth once daily. 30 capsule 11    apixaban (ELIQUIS) 5 mg Tab Take 1 tablet (5 mg total) by mouth 2 (two) times daily. 180 tablet 3    finasteride (PROSCAR) 5 mg tablet Take 1 tablet (5 mg total) by mouth once daily.      RENVELA 800 mg Tab Take 1 tablet (800 mg total) by mouth 3 (three) times daily with meals. 90 tablet 3    tamsulosin (FLOMAX) 0.4 mg Cap Take 2 capsules (0.8 mg total) by mouth once daily. 180 capsule 3     No current facility-administered medications for this visit.

## 2025-05-06 NOTE — ASSESSMENT & PLAN NOTE
HD MWF at [facility] in [place].  Last dialyzed yesterday.  Has left AVF, denies any issues.    Continue to renally dose meds and avoid any nephrotoxic medications.

## 2025-05-07 ENCOUNTER — TELEPHONE (OUTPATIENT)
Dept: FAMILY MEDICINE | Facility: CLINIC | Age: 70
End: 2025-05-07
Payer: MEDICARE

## 2025-05-07 ENCOUNTER — TELEPHONE (OUTPATIENT)
Dept: DERMATOLOGY | Facility: CLINIC | Age: 70
End: 2025-05-07
Payer: MEDICARE

## 2025-05-07 NOTE — TELEPHONE ENCOUNTER
----- Message from Tatiana sent at 5/7/2025  8:19 AM CDT -----  Patient is requesting a sooner appointment.  Patient declined first available appointment listed as well as another facility and provider .  Patient will not accept being placed on the waitlist and is requesting a message be sent to doctor.Name of Caller: TRACI CHAO [[id number]]When is the first available appointment?: 10/6Would the patient rather a call back or a response via [facility]?: [facility]:.[phone]

## 2025-05-08 ENCOUNTER — TELEPHONE (OUTPATIENT)
Dept: FAMILY MEDICINE | Facility: CLINIC | Age: 70
End: 2025-05-08
Payer: MEDICARE

## 2025-05-08 NOTE — TELEPHONE ENCOUNTER
----- Message from Vilma sent at 5/8/2025 11:40 AM CDT -----  Type: Patient Call Back Who called: Self What is the request in detail: pt states he needs a order for home care physical therapy. Unable to travel due to inconsistent transportation.  Can the clinic reply by [facility]? no Would the patient rather a call back or a response via [facility]? Call back  [facility] call back number: [phone] Additional Information: please contact pt to assist

## 2025-05-09 ENCOUNTER — TELEPHONE (OUTPATIENT)
Dept: FAMILY MEDICINE | Facility: CLINIC | Age: 70
End: 2025-05-09
Payer: MEDICARE

## 2025-05-09 RX ORDER — TOBRAMYCIN 3 MG/ML
1 SOLUTION/ DROPS OPHTHALMIC 2 TIMES DAILY
Qty: 5 ML | Refills: 0 | Status: SHIPPED | OUTPATIENT
Start: 2025-05-09

## 2025-05-09 RX ORDER — DOXYCYCLINE 100 MG/1
100 CAPSULE ORAL EVERY 12 HOURS
Qty: 20 CAPSULE | Refills: 0 | Status: SHIPPED | OUTPATIENT
Start: 2025-05-09

## 2025-05-09 NOTE — TELEPHONE ENCOUNTER
Informed patient wheelchair cushion order faxed to [facility] and home health referral placed for physical therapy. Patient voiced understanding.

## 2025-05-09 NOTE — TELEPHONE ENCOUNTER
----- Message from Rohit Shell PA-C sent at 5/9/2025 11:08 AM CDT -----  Regarding: Wheelchair company  Good morning,I ordered a wheelchair cushion for eveline gentleman, but apparently the order has to be directed to whatever company made his wheelchair.  If someone could reach out and figure out which accompanying originally prescribed his wheelchair, we could route the order for the wheelchair cushion to that company.Thank you so much!M

## 2025-05-12 ENCOUNTER — TELEPHONE (OUTPATIENT)
Dept: FAMILY MEDICINE | Facility: CLINIC | Age: 70
End: 2025-05-12
Payer: MEDICARE

## 2025-05-12 NOTE — TELEPHONE ENCOUNTER
Spoke with patient. Order sent to [facility]. Patient was advised. Patient verbalized understanding and does not have any other questions or concerns at this time.

## 2025-05-12 NOTE — TELEPHONE ENCOUNTER
----- Message from Med Assistant Maddy sent at 5/12/2025  9:59 AM CDT -----  Type: Patient Call BackWho called: SelfWhat is the request in detail:pt. States he called [employer] znd they don't have the seat cushion he needs.. he's asking if the office can get in touch with [employer] to see if his insurance is  accepted .. Can the clinic reply by [facility]?NOWould the patient rather a call back or a response via [facility]? Yes, call [facility] call back number; [phone] (home)

## 2025-05-15 ENCOUNTER — TELEPHONE (OUTPATIENT)
Dept: FAMILY MEDICINE | Facility: CLINIC | Age: 70
End: 2025-05-15
Payer: MEDICARE

## 2025-05-15 NOTE — TELEPHONE ENCOUNTER
Called patient to check the status on the order with [employer] For his wheel cushion. I have refax the order over to them. Will call to follow up  on status

## 2025-05-15 NOTE — TELEPHONE ENCOUNTER
----- Message from Med Assistant Tinoco sent at 5/15/2025  9:23 AM CDT -----    ----- Message -----  From: Lona Rodriguez MA  Sent: 5/15/2025   8:25 AM CDT  To: Mandi Schrader Staff    Who called:Pt What is the request in detail:Has staff spoken with Eliot  regarding cushion for his chair ?Can the clinic reply by [facility]? NoWould the patient rather a call back or a response via [facility]? Call backCallback [facility] call back number:Telephone Information:mobile [phone] Additional Information:Thank you.

## 2025-05-16 ENCOUNTER — TELEPHONE (OUTPATIENT)
Dept: FAMILY MEDICINE | Facility: CLINIC | Age: 70
End: 2025-05-16
Payer: MEDICARE

## 2025-05-16 NOTE — TELEPHONE ENCOUNTER
----- Message from Vilma sent at 5/16/2025 10:34 AM CDT -----  Type: Patient Call Back Who called: Self  What is the request in detail: pt states an order to [employer] medical supplies was sent for a wheel chair and pt states they dont take his insurance. He would like to be advise if their are other options that accept his insurance plan.  Can the clinic reply by [facility]? Would the patient rather a call back or a response via [facility]? Call back  [facility] call back number: [phone] Additional Information:

## 2025-05-16 NOTE — TELEPHONE ENCOUNTER
Spoke with patient. Patient states that he did get in contact with [employer] and will receive it Wednesday. Patient does not have any other questions or concerns at this time.

## 2025-05-16 NOTE — TELEPHONE ENCOUNTER
Spoke with patient and informed him that according to his chart the script was fax to [facility] on 5/9/2025 but  re sent to [facility] on 5/15/2025. Patient informed me that the script should have been fax to [facility] due to [facility] would not have this item. Patient was given the number to [employer] to reach out to them on when cushion would be delivered.

## 2025-05-16 NOTE — TELEPHONE ENCOUNTER
----- Message from Med Assistant Frazier sent at 5/16/2025  8:15 AM CDT -----  Name of Who is Calling:TRACI CHAO [[id number]]  What is the request in detail: Pt is requesting a call back to discuss his wheelchair cushion , pt states he have not heard anything. Please assist.  Can the clinic reply by [facility]: No  What Number to Call Back if not in [facility]:[phone]

## 2025-05-27 ENCOUNTER — TELEPHONE (OUTPATIENT)
Dept: VASCULAR SURGERY | Facility: CLINIC | Age: 70
End: 2025-05-27
Payer: MEDICARE

## 2025-05-27 NOTE — TELEPHONE ENCOUNTER
Called and spoke with pt.Changed appt. with provider to sooner after he has his ultrasound test done. Pt. has transportation issues and unable to schedule appts.on the same day and pt. aware.Verbalized understanding.

## 2025-05-27 NOTE — TELEPHONE ENCOUNTER
----- Message from Becca sent at 5/27/2025  8:10 AM CDT -----  Regarding: self  Who called: selfWhat is the request in detail: pt would like a call discussing US scheduled in JuneCan the clinic reply by [facility]? NoWould the patient rather a call back or a response via [facility]? Call [facility] call back number: [phone] Information:Thank you.

## 2025-05-28 NOTE — ED NOTES
Bed: 22  Expected date:   Expected time:   Means of arrival:   Comments:  1   Jeramie Ortiz is a 67 y.o. male.    HPI: Here for complex medical visit.  Seen 4 weeks ago for widespread myalgias, given a brief supply of Norco for pain control and told to follow-up for further evaluation if no better   Widespread muscle pains persist.  At night he can barely touch his arms, has only use 1 Norco and is trying to save them  Left knee which has had previous surgery 3 times is still bothersome  Bilat tinnitus for years  Exercises regularly  Meds, vitamins and allergies reviewed with pt    ROS: No TIA's or unusual headaches, no dysphagia.  No prolonged cough. No dyspnea or chest pain on exertion.  No abdominal pain, change in bowel habits, black or bloody stools.  No urinary tract symptoms.  No new or unusual musculoskeletal symptoms.       Prior to Visit Medications    Medication Sig Taking? Authorizing Provider   metFORMIN (GLUCOPHAGE) 1000 MG tablet TAKE 1 TABLET TWICE DAILY WITH FOOD Yes Leon Valdovinos MD   fluticasone (FLONASE) 50 MCG/ACT nasal spray 2 sprays by Nasal route daily Yes Leon Valdovinos MD   atorvastatin (LIPITOR) 40 MG tablet Take 1 tablet by mouth daily Yes Leon Valdovinos MD   hydroCHLOROthiazide (HYDRODIURIL) 25 MG tablet Take 1 tablet by mouth once daily Yes Leon Valdovinos MD   traZODone (DESYREL) 50 MG tablet Take 1 tablet by mouth nightly as needed for Sleep Yes Leon Valdovinos MD   dicyclomine (BENTYL) 20 MG tablet Take 1 tablet by mouth 4 times daily Yes Leon Valdovinos MD   empagliflozin (JARDIANCE) 25 MG tablet Take 1 tablet by mouth daily Yes Leon Valdovinos MD   amLODIPine (NORVASC) 10 MG tablet TAKE 1 TABLET EVERY DAY Yes Leon Valdovinos MD   carvedilol (COREG) 6.25 MG tablet TAKE 1 TABLET TWICE DAILY WITH MEALS Yes Leon Valdovinos MD   folic acid (FOLVITE) 1 MG tablet Take 1 tablet by mouth four times a week Yes Leon Valdovinos MD   glimepiride (AMARYL) 4 MG tablet Take 1 tablet by mouth in the morning and at bedtime Yes

## 2025-06-04 PROCEDURE — G0179 MD RECERTIFICATION HHA PT: HCPCS | Mod: ,,, | Performed by: INTERNAL MEDICINE

## 2025-06-05 ENCOUNTER — HOSPITAL ENCOUNTER (OUTPATIENT)
Dept: CARDIOLOGY | Facility: HOSPITAL | Age: 70
Discharge: HOME | End: 2025-06-05
Attending: SURGERY
Payer: MEDICARE

## 2025-06-05 DIAGNOSIS — N18.6 ESRD (END STAGE RENAL DISEASE): ICD-10-CM

## 2025-06-05 LAB
HD FISTULA OUTFLOW VEIN VESSEL: NORMAL
HD INFLOW ARTERY VESSEL: NORMAL
RIGHT DIS GRAFT PSV: 149 CM/ SEC
RIGHT INFLOW PSV: 219 CM/S
RIGHT MID GRAFT PSV: 28 CM/S
RIGHT OUTFLOW VEIN PSV: 138 CM/ SEC
RIGHT PROX ANA PSV: 378 CM/S
RIGHT PROX GRAFT PSV: 106 CM/S
RIGHT VOLUME FLOW PSV: 2799 ML/MIN

## 2025-06-05 PROCEDURE — 93990 DOPPLER FLOW TESTING: CPT | Mod: 26,HCNC,, | Performed by: SURGERY

## 2025-06-05 PROCEDURE — 93990 DOPPLER FLOW TESTING: CPT | Mod: TC,HCNC

## 2025-06-10 ENCOUNTER — OFFICE VISIT (OUTPATIENT)
Dept: VASCULAR SURGERY | Facility: CLINIC | Age: 70
End: 2025-06-10
Payer: MEDICARE

## 2025-06-10 VITALS
HEIGHT: 68 IN | WEIGHT: 157.88 LBS | DIASTOLIC BLOOD PRESSURE: 75 MMHG | HEART RATE: 74 BPM | BODY MASS INDEX: 23.93 KG/M2 | SYSTOLIC BLOOD PRESSURE: 124 MMHG

## 2025-06-10 DIAGNOSIS — N18.6 ESRD (END STAGE RENAL DISEASE): Primary | ICD-10-CM

## 2025-06-10 DIAGNOSIS — I73.9 PAD (PERIPHERAL ARTERY DISEASE): ICD-10-CM

## 2025-06-10 DIAGNOSIS — T82.510D PSEUDOANEURYSM OF ARTERIOVENOUS DIALYSIS FISTULA, SUBSEQUENT ENCOUNTER: ICD-10-CM

## 2025-06-10 PROCEDURE — 99999 PR PBB SHADOW E&M-EST. PATIENT-LVL III: CPT | Mod: PBBFAC,HCNC,,

## 2025-06-10 NOTE — H&P (VIEW-ONLY)
[facility]   CLINIC NOTE    06/10/2025    PATIENT ID: Miguel Moore is a 69 y.o. male.    I. HISTORY     HPI: 69 y.o. male with ESRD on HD, hx of DVT, PAD w/ hx of CLTI of RLE (w/ hx of intervention detailed below), who was referred by his dialysis center d/t concerns with HD. Patient has LUE AVF.     HD schedule: MWF 9AM at [facility]   Nephrologist: Dr. Patel ([facility])   Relevant PMHx: CVA w/ residual r-sided hemiplegia, Afibm chronic DVT   Medication(s): Eliquis   Tobacco use: Denies      Previous vascular interventions:   5/2024 (Dr. Lim): RLE angiogram with L fem access. PTA of pop artery with 5x60 ultraverse     12/2024 (Dr. Lim): LUE fistulagram with radial access. PTA of prox fistula 6x40, cephalic arch 6x40 and 7x80, subclav vein 7x80 (ultraverse balloons)      6/10/2025: Here for follow up. Has not been seen since 12/2024 fistulagram procedure. No problems with dialysis. No pain in BLE. Wound at RLE healing.       Past Medical History[1]    Past Surgical History[2]      Current Outpatient Medications:     apixaban (ELIQUIS) 5 mg Tab, Take 1 tablet (5 mg total) by mouth 2 (two) times daily., Disp: 180 tablet, Rfl: 3    aspirin 81 MG Chew, Take 81 mg by mouth once daily., Disp: , Rfl:     atorvastatin (LIPITOR) 80 MG tablet, Take 1 tablet (80 mg total) by mouth once daily., Disp: , Rfl:     doxycycline (VIBRAMYCIN) 100 MG Cap, Take 1 capsule (100 mg total) by mouth every 12 (twelve) hours., Disp: 20 capsule, Rfl: 0    ezetimibe (ZETIA) 10 mg tablet, Take 1 tablet (10 mg total) by mouth once daily. Continue taking Atorvastatin, Disp: 90 tablet, Rfl: 3    finasteride (PROSCAR) 5 mg tablet, Take 1 tablet (5 mg total) by mouth once daily., Disp: , Rfl:     HYDROcodone-acetaminophen (NORCO) 5-325 mg per tablet, Take 1 tablet by mouth every 8 (eight) hours as needed for Pain., Disp: 15 tablet, Rfl: 0    pantoprazole (PROTONIX) 40 MG tablet, Take 1 tablet (40 mg  "total) by mouth 2 (two) times daily., Disp: 180 tablet, Rfl: 3    RENVELA 800 mg Tab, Take 1 tablet (800 mg total) by mouth 3 (three) times daily with meals., Disp: 90 tablet, Rfl: 3    tamsulosin (FLOMAX) 0.4 mg Cap, Take 2 capsules (0.8 mg total) by mouth once daily., Disp: 180 capsule, Rfl: 3    tobramycin sulfate 0.3% (TOBREX) 0.3 % ophthalmic solution, Place 1 drop into both eyes 2 (two) times a day., Disp: 5 mL, Rfl: 0    vitamin renal formula, B-complex-vitamin c-folic acid, (NEPHROCAP) 1 mg Cap, Take 1 capsule by mouth once daily., Disp: 30 capsule, Rfl: 11  Current medications reviewed.    ROS      II. PHYSICAL EXAM     General appearance:  Alert, well-appearing, and in no distress. Oriented to person, place, and time. Wheelchair bound. Hemiplegic.    Neck: Supple, no significant adenopathy.   Chest:  Clear to auscultation, no wheezes, rales or rhonchi, symmetric air entry. No use of accessory muscles. TDC at right upper chest.   Cardiac: Normal rate and regular rhythm.    Abdomen: Soft, nontender, nondistended, no masses or organomegaly, no hernia. No rebound tenderness noted.   Musculoskeletal:  Digits/nail without cyanosis/clubbing. Hemiplegia- R side.    Extremities:  2 + L radial pulse  No LUE edema  RLE with wound.    Neurological:  Normal speech, no focal findings noted; CN II - XII grossly intact. BUE with sensation to light touch.    Skin: No tissue loss, rashes.     ACCESS EXAM   Location: Left Brachial cephalic vein AVF   Appearance: Dilated, aneurysmal. Thinned skin with scab atop aneurysmal portion. **See media**   Thrill: Strong thrill throughout   Pulse: Very minimal pulsatility       III. LABS & IMAGING     LAB RESULTS:  No results found for: "CBC"  Lab Results   Component Value Date    LABPROT 11.8 02/15/2025    INR 1.1 02/15/2025     Lab Results   Component Value Date     (L) 02/26/2025    K 4.6 02/26/2025    CL 98 02/26/2025    CO2 21 (L) 02/26/2025     (H) 02/26/2025    BUN " 43 (H) 02/26/2025    CREATININE 9.8 (H) 02/26/2025    CALCIUM 8.3 (L) 02/26/2025    ANIONGAP 12 02/26/2025    EGFRNONAA 11 (A) 02/11/2021     Lab Results   Component Value Date    WBC 5.49 02/26/2025    RBC 2.59 (L) 02/26/2025    HGB 10.1 (L) 06/02/2025    HCT 21.6 (L) 02/26/2025    MCV 83 02/26/2025    MCH 27.4 02/26/2025    MCHC 32.9 02/26/2025    RDW 14.1 02/26/2025    PLT SEE COMMENT 02/26/2025    MPV SEE COMMENT 02/26/2025    GRAN 3.1 02/26/2025    GRAN 55.9 02/26/2025    LYMPH 1.4 02/26/2025    LYMPH 26.2 02/26/2025    MONO 0.6 02/26/2025    MONO 11.5 02/26/2025    EOS 0.3 02/26/2025    BASO 0.02 02/26/2025    EOSINOPHIL 5.3 02/26/2025    BASOPHIL 0.4 02/26/2025    DIFFMETHOD Automated 02/26/2025     Lab Results   Component Value Date    HGBA1C 5.8 06/09/2025       IMAGING RESULTS:  I have personally reviewed the images/studies and provided my interpretation below.     HD US: 6/4/2025  Findings include no HDS stenosis.   Peak flow: 2799 ml/min      IV. ASSESSMENT & PLAN      1. ESRD (end stage renal disease)    2. PAD (peripheral artery disease)    3. Pseudoaneurysm of arteriovenous dialysis fistula, subsequent encounter        Miguel Moore is a 69 y.o. male with ESRD on HD via left upper extremity brachialcephalic AVF presents for routine surveillance. No complaints. Good thrill. Palpable radial pulse. However, aneurysmal dilation of AVF with thinned skin and scabbing, concern for scab unroofing and hemorrhagia. Recommending plication in near future, pending discussion with Dr. Lim.     - Recommend plication of aneurysmal portion of AVF in OR. Date pending.   - Cont dialysis. Rec rotating stick sites.   - Rec vigilance with keeping AVF protected       ELIZ PereiraC  Vascular & Endovascular Surgery  [facility] I spent a total of 26 minutes on the day of the visit.This includes face to face time and non-face to face time preparing to see the patient (eg, review of  tests), obtaining and/or reviewing separately obtained history, documenting clinical information in the electronic or other health record, independently interpreting results and communicating results to the patient/family/caregiver, or care coordinator.      Addendum 6/30/2025: Discussed with Dr. Lim. Plan for LUE AVF plication with Dr. Lim Thursday, July 3, 2025. Patient agrees. Instructed to hold Eliquis 48 hrs prior.            [1]   Past Medical History:  Diagnosis Date    Allergy     Clotting disorder     Eliquis and Plavix    Deep vein thrombosis     Diabetes mellitus, type 2     Encounter for annual health examination 5/6/2025    Hypertension     Nuclear sclerosis of both eyes 06/09/2017    Renal disorder     Seizures     Stroke     Urinary tract infection    [2]   Past Surgical History:  Procedure Laterality Date    CATARACT EXTRACTION W/  INTRAOCULAR LENS IMPLANT Right 10/05/2017    Dr. Tay    CATARACT EXTRACTION W/  INTRAOCULAR LENS IMPLANT Left 10/19/2017    Dr. Tay    CYSTOSCOPY W/ RETROGRADES Bilateral 2/18/2021    Procedure: CYSTOSCOPY, WITH RETROGRADE PYELOGRAM;  Surgeon: JEMMA Styles MD;  Location: [facility] OR;  Service: Urology;  Laterality: Bilateral;  REQUESTING EARLY AS POSSIBE-LO / 2/8/2021 @ 1:13PM  RN Pre Op 2-11-21, Covid NEGATIVE ON  2-17-21.  C A    ESOPHAGOGASTRODUODENOSCOPY N/A 8/17/2020    Procedure: EGD (ESOPHAGOGASTRODUODENOSCOPY);  Surgeon: Desmond Chapman MD;  Location: [facility] ENDO;  Service: Endoscopy;  Laterality: N/A;    ESOPHAGOGASTRODUODENOSCOPY N/A 8/21/2024    Procedure: EGD (ESOPHAGOGASTRODUODENOSCOPY);  Surgeon: Tonie Chauhan MD;  Location: [facility] ENDO;  Service: Endoscopy;  Laterality: N/A;    ESOPHAGOGASTRODUODENOSCOPY N/A 12/5/2024    Procedure: EGD (ESOPHAGOGASTRODUODENOSCOPY);  Surgeon: Tonie Chauhan MD;  Location: [facility] ENDO;  Service: Endoscopy;  Laterality: N/A;  Bren Ovalles, standard prep, mailed , Eliquis, Dialysis LAB, ASam  ok to  hold Eliquis 2 days per Dr Raymond-GT  11/8/24- pc complete. DBM  11/13 pt r/s, Eliquis hold x 2 days per Dr. Raymond see t/e 10/29/24, Dialysis MWF, K+ labs in AM, mailed -ml  11/27 precall com    EYE SURGERY Bilateral     cataract    FEMORAL ARTERY STENT      FRACTURE SURGERY      INCISION AND DRAINAGE, LOWER EXTREMITY Right 4/4/2024    Procedure: INCISION AND DRAINAGE, LOWER EXTREMITY;  Surgeon: Jimbo Montilla III, MD;  Location: [facility] OR;  Service: Orthopedics;  Laterality: Right;    INCISION AND DRAINAGE, LOWER EXTREMITY Right 4/6/2024    Procedure: INCISION AND DRAINAGE, LOWER EXTREMITY;  Surgeon: Desmond Barillas MD;  Location: [facility] OR;  Service: Orthopedics;  Laterality: Right;  foot and ankle    INCISION AND DRAINAGE, LOWER EXTREMITY Right 4/9/2024    Procedure: INCISION AND DRAINAGE, LOWER EXTREMITY- FOOT & ANKLE;  Surgeon: Jimbo Montilla III, MD;  Location: [facility] OR;  Service: Orthopedics;  Laterality: Right;  CULTURES, VASCHE    INCISION AND DRAINAGE, LOWER EXTREMITY Right 5/21/2024    Procedure: INCISION AND DRAINAGE, LOWER EXTREMITY;  Surgeon: Jimbo Montilla III, MD;  Location: [facility] OR;  Service: Orthopedics;  Laterality: Right;  Pulsavac and Vashe    KNEE SURGERY      bilateral scope    TOTAL REPLACEMENT OF HIP JOINT USING COMPUTER-ASSISTED NAVIGATION Right 2/17/2025    Procedure: ARTHROPLASTY, HIP, TOTAL, USING COMPUTER-ASSISTED NAVIGATION;  Surgeon: Chris Quesada MD;  Location: [facility] OR;  Service: Orthopedics;  Laterality: Right;  getbetter!. ADD ON TO FOLLOW MY CASES    WOUND DRESSING Right 4/9/2024    Procedure: WOUND VAC EXCHANGE;  Surgeon: Jimbo Montilla III, MD;  Location: [facility] OR;  Service: Orthopedics;  Laterality: Right;

## 2025-06-10 NOTE — PROGRESS NOTES
[facility]   CLINIC NOTE    06/10/2025    PATIENT ID: Miguel Moore is a 69 y.o. male.    I. HISTORY     HPI: 69 y.o. male with ESRD on HD, hx of DVT, PAD w/ hx of CLTI of RLE (w/ hx of intervention detailed below), who was referred by his dialysis center d/t concerns with HD. Patient has LUE AVF.     HD schedule: MWF 9AM at [facility]   Nephrologist: Dr. Patel ([facility])   Relevant PMHx: CVA w/ residual r-sided hemiplegia, Afibm chronic DVT   Medication(s): Eliquis   Tobacco use: Denies      Previous vascular interventions:   5/2024 (Dr. Lim): RLE angiogram with L fem access. PTA of pop artery with 5x60 ultraverse     12/2024 (Dr. Lim): LUE fistulagram with radial access. PTA of prox fistula 6x40, cephalic arch 6x40 and 7x80, subclav vein 7x80 (ultraverse balloons)      6/10/2025: Here for follow up. Has not been seen since 12/2024 fistulagram procedure. No problems with dialysis. No pain in BLE. Wound at RLE healing.       Past Medical History[1]    Past Surgical History[2]      Current Outpatient Medications:     apixaban (ELIQUIS) 5 mg Tab, Take 1 tablet (5 mg total) by mouth 2 (two) times daily., Disp: 180 tablet, Rfl: 3    aspirin 81 MG Chew, Take 81 mg by mouth once daily., Disp: , Rfl:     atorvastatin (LIPITOR) 80 MG tablet, Take 1 tablet (80 mg total) by mouth once daily., Disp: , Rfl:     doxycycline (VIBRAMYCIN) 100 MG Cap, Take 1 capsule (100 mg total) by mouth every 12 (twelve) hours., Disp: 20 capsule, Rfl: 0    ezetimibe (ZETIA) 10 mg tablet, Take 1 tablet (10 mg total) by mouth once daily. Continue taking Atorvastatin, Disp: 90 tablet, Rfl: 3    finasteride (PROSCAR) 5 mg tablet, Take 1 tablet (5 mg total) by mouth once daily., Disp: , Rfl:     HYDROcodone-acetaminophen (NORCO) 5-325 mg per tablet, Take 1 tablet by mouth every 8 (eight) hours as needed for Pain., Disp: 15 tablet, Rfl: 0    pantoprazole (PROTONIX) 40 MG tablet, Take 1 tablet (40 mg  "total) by mouth 2 (two) times daily., Disp: 180 tablet, Rfl: 3    RENVELA 800 mg Tab, Take 1 tablet (800 mg total) by mouth 3 (three) times daily with meals., Disp: 90 tablet, Rfl: 3    tamsulosin (FLOMAX) 0.4 mg Cap, Take 2 capsules (0.8 mg total) by mouth once daily., Disp: 180 capsule, Rfl: 3    tobramycin sulfate 0.3% (TOBREX) 0.3 % ophthalmic solution, Place 1 drop into both eyes 2 (two) times a day., Disp: 5 mL, Rfl: 0    vitamin renal formula, B-complex-vitamin c-folic acid, (NEPHROCAP) 1 mg Cap, Take 1 capsule by mouth once daily., Disp: 30 capsule, Rfl: 11  Current medications reviewed.    ROS      II. PHYSICAL EXAM     General appearance:  Alert, well-appearing, and in no distress. Oriented to person, place, and time. Wheelchair bound. Hemiplegic.    Neck: Supple, no significant adenopathy.   Chest:  Clear to auscultation, no wheezes, rales or rhonchi, symmetric air entry. No use of accessory muscles. TDC at right upper chest.   Cardiac: Normal rate and regular rhythm.    Abdomen: Soft, nontender, nondistended, no masses or organomegaly, no hernia. No rebound tenderness noted.   Musculoskeletal:  Digits/nail without cyanosis/clubbing. Hemiplegia- R side.    Extremities:  2 + L radial pulse  No LUE edema  RLE with wound.    Neurological:  Normal speech, no focal findings noted; CN II - XII grossly intact. BUE with sensation to light touch.    Skin: No tissue loss, rashes.     ACCESS EXAM   Location: Left Brachial cephalic vein AVF   Appearance: Dilated, aneurysmal. Thinned skin with scab atop aneurysmal portion. **See media**   Thrill: Strong thrill throughout   Pulse: Very minimal pulsatility       III. LABS & IMAGING     LAB RESULTS:  No results found for: "CBC"  Lab Results   Component Value Date    LABPROT 11.8 02/15/2025    INR 1.1 02/15/2025     Lab Results   Component Value Date     (L) 02/26/2025    K 4.6 02/26/2025    CL 98 02/26/2025    CO2 21 (L) 02/26/2025     (H) 02/26/2025    BUN " 43 (H) 02/26/2025    CREATININE 9.8 (H) 02/26/2025    CALCIUM 8.3 (L) 02/26/2025    ANIONGAP 12 02/26/2025    EGFRNONAA 11 (A) 02/11/2021     Lab Results   Component Value Date    WBC 5.49 02/26/2025    RBC 2.59 (L) 02/26/2025    HGB 10.1 (L) 06/02/2025    HCT 21.6 (L) 02/26/2025    MCV 83 02/26/2025    MCH 27.4 02/26/2025    MCHC 32.9 02/26/2025    RDW 14.1 02/26/2025    PLT SEE COMMENT 02/26/2025    MPV SEE COMMENT 02/26/2025    GRAN 3.1 02/26/2025    GRAN 55.9 02/26/2025    LYMPH 1.4 02/26/2025    LYMPH 26.2 02/26/2025    MONO 0.6 02/26/2025    MONO 11.5 02/26/2025    EOS 0.3 02/26/2025    BASO 0.02 02/26/2025    EOSINOPHIL 5.3 02/26/2025    BASOPHIL 0.4 02/26/2025    DIFFMETHOD Automated 02/26/2025     Lab Results   Component Value Date    HGBA1C 5.8 06/09/2025       IMAGING RESULTS:  I have personally reviewed the images/studies and provided my interpretation below.     HD US: 6/4/2025  Findings include no HDS stenosis.   Peak flow: 2799 ml/min      IV. ASSESSMENT & PLAN      1. ESRD (end stage renal disease)    2. PAD (peripheral artery disease)    3. Pseudoaneurysm of arteriovenous dialysis fistula, subsequent encounter        Miguel Moore is a 69 y.o. male with ESRD on HD via left upper extremity brachialcephalic AVF presents for routine surveillance. No complaints. Good thrill. Palpable radial pulse. However, aneurysmal dilation of AVF with thinned skin and scabbing, concern for scab unroofing and hemorrhagia. Recommending plication in near future, pending discussion with Dr. Lim.     - Recommend plication of aneurysmal portion of AVF in OR. Date pending.   - Cont dialysis. Rec rotating stick sites.   - Rec vigilance with keeping AVF protected       ELIZ PereiraC  Vascular & Endovascular Surgery  [facility]    \  I spent a total of 26 minutes on the day of the visit.This includes face to face time and non-face to face time preparing to see the patient (eg, review of  tests), obtaining and/or reviewing separately obtained history, documenting clinical information in the electronic or other health record, independently interpreting results and communicating results to the patient/family/caregiver, or care coordinator.           [1]   Past Medical History:  Diagnosis Date    Allergy     Clotting disorder     Eliquis and Plavix    Deep vein thrombosis     Diabetes mellitus, type 2     Encounter for annual health examination 5/6/2025    Hypertension     Nuclear sclerosis of both eyes 06/09/2017    Renal disorder     Seizures     Stroke     Urinary tract infection    [2]   Past Surgical History:  Procedure Laterality Date    CATARACT EXTRACTION W/  INTRAOCULAR LENS IMPLANT Right 10/05/2017    Dr. Tay    CATARACT EXTRACTION W/  INTRAOCULAR LENS IMPLANT Left 10/19/2017    Dr. Tay    CYSTOSCOPY W/ RETROGRADES Bilateral 2/18/2021    Procedure: CYSTOSCOPY, WITH RETROGRADE PYELOGRAM;  Surgeon: JEMMA Styles MD;  Location: [facility] OR;  Service: Urology;  Laterality: Bilateral;  REQUESTING EARLY AS POSSIBE-LO / 2/8/2021 @ 1:13PM  RN Pre Op 2-11-21, Covid NEGATIVE ON  2-17-21.  C A    ESOPHAGOGASTRODUODENOSCOPY N/A 8/17/2020    Procedure: EGD (ESOPHAGOGASTRODUODENOSCOPY);  Surgeon: Desmond Chapman MD;  Location: [facility] ENDO;  Service: Endoscopy;  Laterality: N/A;    ESOPHAGOGASTRODUODENOSCOPY N/A 8/21/2024    Procedure: EGD (ESOPHAGOGASTRODUODENOSCOPY);  Surgeon: Tonie Chauhan MD;  Location: [facility] ENDO;  Service: Endoscopy;  Laterality: N/A;    ESOPHAGOGASTRODUODENOSCOPY N/A 12/5/2024    Procedure: EGD (ESOPHAGOGASTRODUODENOSCOPY);  Surgeon: Tonie Chauhan MD;  Location: [facility];  Service: Endoscopy;  Laterality: N/A;  Bren Ovalles, standard prep, mailed , Eliquis, Dialysis LAB, ASam  ok to hold Eliquis 2 days per Dr Raymond-GT  11/8/24- pc complete. DBM  11/13 pt r/s, Eliquis hold x 2 days per Dr. Raymond see t/e 10/29/24, Dialysis MWF, K+ labs in AM, mailed -ml  11/27  precall com    EYE SURGERY Bilateral     cataract    FEMORAL ARTERY STENT      FRACTURE SURGERY      INCISION AND DRAINAGE, LOWER EXTREMITY Right 4/4/2024    Procedure: INCISION AND DRAINAGE, LOWER EXTREMITY;  Surgeon: Jimbo Montilla III, MD;  Location: [facility] OR;  Service: Orthopedics;  Laterality: Right;    INCISION AND DRAINAGE, LOWER EXTREMITY Right 4/6/2024    Procedure: INCISION AND DRAINAGE, LOWER EXTREMITY;  Surgeon: Desmond Barillas MD;  Location: [facility] [place] OR;  Service: Orthopedics;  Laterality: Right;  foot and ankle    INCISION AND DRAINAGE, LOWER EXTREMITY Right 4/9/2024    Procedure: INCISION AND DRAINAGE, LOWER EXTREMITY- FOOT & ANKLE;  Surgeon: Jimbo Montilla III, MD;  Location: [facility] [place] OR;  Service: Orthopedics;  Laterality: Right;  CULTURES, VASCHE    INCISION AND DRAINAGE, LOWER EXTREMITY Right 5/21/2024    Procedure: INCISION AND DRAINAGE, LOWER EXTREMITY;  Surgeon: Jimbo Montilla III, MD;  Location: [facility] [place] OR;  Service: Orthopedics;  Laterality: Right;  Pulsavac and Vashe    KNEE SURGERY      bilateral scope    TOTAL REPLACEMENT OF HIP JOINT USING COMPUTER-ASSISTED NAVIGATION Right 2/17/2025    Procedure: ARTHROPLASTY, HIP, TOTAL, USING COMPUTER-ASSISTED NAVIGATION;  Surgeon: Chris Quesada MD;  Location: [facility] [place] OR;  Service: Orthopedics;  Laterality: Right;  Labadie. ADD ON TO FOLLOW MY CASES    WOUND DRESSING Right 4/9/2024    Procedure: WOUND VAC EXCHANGE;  Surgeon: Jimbo Montilla III, MD;  Location: [facility] [place] OR;  Service: Orthopedics;  Laterality: Right;

## 2025-06-24 ENCOUNTER — OFFICE VISIT (OUTPATIENT)
Dept: ORTHOPEDICS | Facility: CLINIC | Age: 70
End: 2025-06-24
Payer: MEDICARE

## 2025-06-24 VITALS — WEIGHT: 157.88 LBS | BODY MASS INDEX: 23.93 KG/M2 | HEIGHT: 68 IN

## 2025-06-24 DIAGNOSIS — Z96.641 STATUS POST RIGHT HIP REPLACEMENT: Primary | ICD-10-CM

## 2025-06-24 PROCEDURE — 3288F FALL RISK ASSESSMENT DOCD: CPT | Mod: CPTII,HCNC,S$GLB,

## 2025-06-24 PROCEDURE — 1159F MED LIST DOCD IN RCRD: CPT | Mod: CPTII,HCNC,S$GLB,

## 2025-06-24 PROCEDURE — 1126F AMNT PAIN NOTED NONE PRSNT: CPT | Mod: CPTII,HCNC,S$GLB,

## 2025-06-24 PROCEDURE — 1157F ADVNC CARE PLAN IN RCRD: CPT | Mod: CPTII,HCNC,S$GLB,

## 2025-06-24 PROCEDURE — 1101F PT FALLS ASSESS-DOCD LE1/YR: CPT | Mod: CPTII,HCNC,S$GLB,

## 2025-06-24 PROCEDURE — 3008F BODY MASS INDEX DOCD: CPT | Mod: CPTII,HCNC,S$GLB,

## 2025-06-24 PROCEDURE — 99213 OFFICE O/P EST LOW 20 MIN: CPT | Mod: HCNC,S$GLB,,

## 2025-06-24 PROCEDURE — 3044F HG A1C LEVEL LT 7.0%: CPT | Mod: CPTII,HCNC,S$GLB,

## 2025-06-24 PROCEDURE — 3066F NEPHROPATHY DOC TX: CPT | Mod: CPTII,HCNC,S$GLB,

## 2025-06-24 PROCEDURE — 99999 PR PBB SHADOW E&M-EST. PATIENT-LVL III: CPT | Mod: PBBFAC,HCNC,,

## 2025-06-24 NOTE — PROGRESS NOTES
Ortho Hip Follow Up Note    PCP: Raciel Raymond MD   Referring Provider: No referring provider defined for this encounter.     Assessment:  69 y.o. male status post right Total Hip Primary completed on 2/17/25 for femoral neck fracture.  Patient discharged from nursing home and back at his house.  But overall he is doing fairly well at this stage.  Still utilizing a wheelchair for longer distances.  Using a cane mostly at home.  He is able to do all of his activities of daily living at home.  No longer taking oral pain medications.  Continuing to do exercises at home as instructed by Physical therapy.  Currently has home health coming twice a week for wound care in regards to his foot wound.  Overall, no acute concerns regarding his hip.    Plan:  Follow up 1 year postop with Dr. Quesada  Future Radiographs Indicated at next visit:  Yes    Pain Management:  Over-the-counter as needed  Anticoagulation: Resume home anticoagulation previously managed by another provider  Wound Care:  None concerning the hip    Patient Reassurance:   Post-operative course discussed with patient. Patient reassured and supported. All questions answered.    ACTIVE PROBLEM LIST  Problem List[1]      HPI:  Miguel Moore presents today for a post-op visit.    STATUS POST:  right Total Hip Primary  BMI: Body mass index is 24 kg/m².    Post operative recovery was complicated by: None    Patient rates his condition as improving. Pleased with surgical outcome to date.     Functional Assessment:  EVIE  Functional Difficulties:  None reported - however, still utilizing wheelchair and cane.  still has difficulty ambulating long distances   Pain Medication:  Non-Narcotic  Anticoagulation: Eloquis    EXAM: POST OP HIP    Right Lower Extremity:    Dorsalis pedis pulses palpable     Dorsi flexion 1/5 strength, same as pre-op    Plantar flexion 1/5 strength, same as pre-op     Extensor hallucis extension: 1/5 strength, same as pre-op    Sensory intact  to light touch L1-S1    Incision well healed    Hip Imaging:  Implants are well fixed. There is no evidence of loosening. No dislocation.   Estimated Anteversion: 25  Estimate Abduction: 42  Estimated LLD: 2mm                [1]   Patient Active Problem List  Diagnosis    Benign essential HTN    Dyslipidemia    BPH (benign prostatic hyperplasia) 2/18/21 prostate bx normal    Seizure disorder as sequela of cerebrovascular accident    Hemiplegia and hemiparesis following cerebral infarction affecting right dominant side    Microcytosis 9/2019 labs c/w AoCD and AoCKD    ESRD (end stage renal disease)    Nuclear sclerosis, left    Cortical cataract of both eyes    DM type 2 without retinopathy    Secondary hyperparathyroidism of renal origin    Vitamin D deficiency    Right sided weakness    Senile nuclear sclerosis    CME (cystoid macular edema), bilateral    Refractive error    History of fungal infection candida parasilosis hospitalization 8/2020    Chronic deep vein thrombosis (DVT) of popliteal vein of right lower extremity 9/21/20 outside [place]; unprovoked; anticoagulation    Debility    Pulmonary nodule 1/2021 4 mm nodule.    Elevated PSA 2/18/21 prostate bx normal    Anemia in chronic kidney disease    History of diabetic ulcer of foot    Pseudophakia    Bilateral posterior capsular opacification    Paroxysmal atrial fibrillation    Peripheral artery disease 5/16/24 LE angio with angioplasty popliteal artery    Abdominal aortic atherosclerosis on CT 4/1/24    Upper GI bleed 8/23/24 coil embolization L gastric artery    Wounds, multiple    Acute blood loss anemia (ABLA)    Closed fracture of right hip    Slow transit constipation    Encounter for annual health examination    Routine medical exam

## 2025-07-01 ENCOUNTER — HOSPITAL ENCOUNTER (OUTPATIENT)
Dept: PREADMISSION TESTING | Facility: HOSPITAL | Age: 70
Discharge: HOME | End: 2025-07-01
Attending: SURGERY
Payer: MEDICARE

## 2025-07-01 ENCOUNTER — TELEPHONE (OUTPATIENT)
Dept: PREADMISSION TESTING | Facility: HOSPITAL | Age: 70
End: 2025-07-01
Payer: MEDICARE

## 2025-07-01 ENCOUNTER — TELEPHONE (OUTPATIENT)
Dept: VASCULAR SURGERY | Facility: CLINIC | Age: 70
End: 2025-07-01
Payer: MEDICARE

## 2025-07-01 ENCOUNTER — ANESTHESIA EVENT (OUTPATIENT)
Dept: SURGERY | Facility: HOSPITAL | Age: 70
End: 2025-07-01
Payer: MEDICARE

## 2025-07-01 NOTE — TELEPHONE ENCOUNTER
Spoke with pt. and confirmed his phone pre-op appt. Is tomorrow 2 #pm. Pt. verbalized understanding.

## 2025-07-01 NOTE — TELEPHONE ENCOUNTER
Received message from pre-op that unable to reach pt. regarding phone pre-op scheduled for this afternoon. I called and spoke with pt. and aware of phone pre-op appt. scheduled for today. Pt.verbalized understanding and message sent to pre-op.

## 2025-07-02 ENCOUNTER — TELEPHONE (OUTPATIENT)
Dept: SURGERY | Facility: HOSPITAL | Age: 70
End: 2025-07-02
Payer: MEDICARE

## 2025-07-02 ENCOUNTER — HOSPITAL ENCOUNTER (OUTPATIENT)
Dept: PREADMISSION TESTING | Facility: HOSPITAL | Age: 70
Discharge: HOME | End: 2025-07-02
Attending: SURGERY
Payer: MEDICARE

## 2025-07-02 VITALS — WEIGHT: 157 LBS | BODY MASS INDEX: 23.79 KG/M2 | HEIGHT: 68 IN

## 2025-07-02 NOTE — DISCHARGE INSTRUCTIONS
YOUR PROCEDURE WILL BE AT [facility] at [address] [place]. [address]                 Enter through the Main Entrance facing [address].      Your procedure  is scheduled for __________.    Call [phone] between 2pm and 5pm on _______to find out your arrival time for the day of surgery.    You may have up to three visitors.  No children under 18 years old.     You will be going to the Same Day Surgery Unit on the 2nd floor of the hospital.    Report to the Same Day Surgery Registration Desk in the hallway.(Just beside the Same Day Surgery Unit)                    DO NOT PARK IN THE GARAGE.  THERE IS NO OPEN ENTRANCE TO THE HOSPITAL BUILDING AND NO ELEVATOR.      Important instructions:  Do not eat anything after midnight.  You may have plain water, non carbonated.  You may also have Gatorade or Powerade(avoid red/blue) after midnight.    Stop all fluids 2 hours before your surgery.    It is okay to brush your teeth.  Do not have gum, candy or mints.    SEE MEDICATION SHEET.   TAKE MEDICATIONS AS DIRECTED.    Do not take any diabetic medication on the morning of surgery unless instructed to do so by your doctor or pre op nurse.    All GLP-1 weekly diabetic/weight loss medications must not be taken for one week before your surgery, or your surgery could be canceled.      STOP taking for 7 days before surgery:    Aspirin           Voltaren (Diclofenac)  Ibuprofen  (Advil, Motrin)                Indomethocin  Mobic (meloxicam, celebrex)      Etodolac   Aleve (naproxen)          Toradol (ketoralac)  Fish oil, Krill oil and Vitamin E  Headache Powders (BC Powder, Goody's Powder, Stanback)                           You may take Tylenol if needed which is not a blood thinner.    Please shower the night before and the morning of your surgery.      Follow any Prep Instructions given by your surgeon.               Use Chlorhexidine soap as instructed by your pre op nurse.    Please place clean linens on your bed the night before surgery. Please wear fresh clean clothing after each shower.    No shaving of procedural area at least 4-5 days before surgery due to increased risk of skin irritation and/or possible infection.    Female patients may be asked for a urine specimen on the morning of the surgery.  Please check with your nurse before using the restroom.    Contact lenses and removable denture work may not be worn during your procedure.    You may wear deodorant only. If you are having breast surgery, do not wear deodorant on the operative side.    Do not wear powder, body lotion, perfume/cologne or make-up.    Do not wear any jewelry or have any metal on your body.    You will be asked to remove any dentures or partials for the procedure.    If you are going home on the same day of surgery, you must arrange for a family member or a friend to drive you home.  Public transportation is prohibited.  You will not be able to drive home if you were given anesthesia or sedation.    Patients who want to have their Post-op prescriptions filled from our in-house [facility], bring a Credit/Debit Card or cash with you. A co-pay may be required.  The pharmacy closes at 5:30 pm.    Wear loose fitting clothes allowing for bandages.    Please leave money and valuables home.      You may bring your cell phone.    Call the doctor if fever or illness should occur before your surgery.    Call [phone] to contact us here if needed.                            CLOTHES ON DAY OF SURGERY    SHOULDER surgery:  you must have a very oversized shirt.  Very, Very large.  You will probably have a large sling on with your arm strapped to your chest.  You will not be able to put the arm of the operated shoulder into a sleeve.  You can put the arm of the un-operated shoulder into the sleeve, but the shirt will need to be draped over the operated shoulder.       ARM or HAND surgery:  make sure that your sleeves  are large and loose enough to pass over large dressings or cast.      BREAST or UNDERARM surgery:  wear a loose, button down shirt so that you can dress without raising your arms over your head.    ABDOMINAL surgery:  wear loose, comfortable clothing.  Nothing tight around the abdomen.  NO JEANS    PENIS or SCROTAL surgery:  loose comfortable clothing.  Large sweat pants, pajama pants or a robe.  ABSOLUTELY NO JEANS      LEG or FOOT surgery:  wear large loose pants that are able to pass over any large dressings or casts.  You could also wear loose shorts or a skirt.

## 2025-07-02 NOTE — DISCHARGE INSTRUCTIONS
[facility]   DISCHARGE INSTRUCTIONS    If you have any questions about this form, please call [phone] or send us a message on lmbang.    Once you are discharged home, please abide by the following instructions:     DAY OF POST-PROCEDURE:  The anesthesia used to numb your arm may not have fully worn off prior to discharge. You may be given a sling at discharge to protect your arm. Please be mindful of the following instructions:    Do not sleep or rest on the operative arm/hand.  Move arm/hand in sling, check color of hand, and check pulse in wrist approximately every two hours.  Rest arm on pillows when resting or asleep.  Do not allow arm to rest across bed rails or similar.  The operative arm may be numb for several hours after your surgery. It should resolve by the next day. If it has not, please call our office or go the ER.   You should not go home alone the day of the procedure. Because of the sedation you were given for your procedure, we recommend that you have someone stay with you overnight following your procedure.  Do not drive a car or operate machinery for the remainder of the day.  Do not consume any alcoholic beverages for the remainder of the day.  Postpone signing any important papers or making any important decision for the remainder of the day.  Do not take any muscle relaxants, sedatives, hypnotics, or mood-altering medication today unless ordered by your physician who is aware you are taking the medication today.    WOUND CARE:  Take your incision dressing off 2 days after your surgery and gently rinse your incision with soap and water daily. Pat the incision dry afterward.  Take a full shower daily beginning 2 days after the surgery. Allow soap and water to run over your incision. Pat the incision dry with clean towel afterwards.  When resting or sleeping, try to keep your arm elevated to shoulder level on pillows to reduce swelling.    MEDICATIONS:  Please  refer to discharge medication reconciliation form.  Continue taking all of your home medications.  You make take over the counter tylenol (acetaminophen) as needed for pain. Percocet has been prescribed to you and can be taken as needed for more severe pain.   Continue taking the aspirin 81mg daily.  Restart your Eliquis tomorrow.    ACTIVITY RESTRICTIONS:  Avoid prolonged exertion of the affected arm.  Avoid keeping your arm down below your chest for prolonged periods of time (this could lead to increased swelling).  No heavy lifting with the affected arm.  Sleep with your arm elevated on pillows at night to reduce swelling.  No swimming in pools, lakes, [employer] etc. for at least 6 weeks after your surgery.    DIET:  Resume your pre-operative home diet    FOLLOW UP:  Follow up with Dr. Lim/Germaine in 3-4 weeks with hemodialysis ultrasound.  Our clinic staff will call you within the next few days to schedule an appointment. If you have not heard from our staff within the next 3 business days, please give our office a call or send us a NuLabel message.   As a reminder, our office is located in the [facility] in [address] ([address] [place] [address]).   If you have any questions or concerns, please call our office at [phone] or message us via NuLabel.     CALL THE OFFICE ([phone]) or GO TO THE EMERGENCY ROOM:  Fever over 101F  Signs of infection at the incision (redness, pain, warmth, pus, tenderness)  Numbness, tingling, coldness, weakness, or pain in the arm/hand  Bleeding or discharge from the access site  Shortness of breath, difficulty breathing, chest pain  Any severe pain unrelieved by pain medicine.      Thank you for entrusting us with your care. If you have any questions or concerns, please do not hesitate to call our offices [phone] or message us on NuLabel.     MD Germaine Castillo PA-C  Department of Vascular & Endovascular  Surgery  [facility]            What you should know:    Call the office for and appointment if one has not already been made.    You may experience common minor surgical & anesthesia related discomforts such as: ?  muscle aches, headaches, pain/discomfort, nausea & vomiting. These should improve in   24-48 hrs.    Swelling and discoloration/bruising are common around surgical incision; a cold pack over the   wound during the first 24 hours will help to minimize swelling and bruising.     You have received sedation/anesthesia today: You may NOT drive, drink alcohol, sign legal documents for the next 24 hours.    It is important that you stay ahead of the pain. Take pain medication as prescribed by   your doctor. After 24-48 hours, you may take Acetaminophen (Tylenol) or Ibuprofen   (Advil) for minor discomfort, unless you are restricted from using these.    Rest today: A responsible adult should stay with you the first night after your procedure.  You may resume regular activities 24 hours after the procedure.    You may slowly resume your regular diet as tolerated. Drink plenty of fluids the first 48 hours and you may resume your   usual diet.     Activity: No heavy lifting (over 10 pounds), pushing or pulling until your   post op visit. Your doctor's office may have told you to limit your lifting to less weight, or even no weight.  Be sure to follow those instructions.    Call the doctor for any of the following problems:   fever above 101,   severe pain, bleeding, or abdominal distention (swelling).   If constipated you may take any stool softener you choose.     Occasionally small areas of skin numbness or an unpleasant skin sensation can result. Also, you may find that your incision is swollen and tender for a few days.  Some redness around sutures and staples is a normal reaction, but if the discomfort persists or worsens, call you doctor. Please go to nearest Emergency Room/call [phone]  if you feel your symptoms need immediate attention.

## 2025-07-03 ENCOUNTER — HOSPITAL ENCOUNTER (OUTPATIENT)
Facility: HOSPITAL | Age: 70
Discharge: HOME | End: 2025-07-03
Attending: SURGERY | Admitting: SURGERY
Payer: MEDICARE

## 2025-07-03 ENCOUNTER — TELEPHONE (OUTPATIENT)
Dept: FAMILY MEDICINE | Facility: CLINIC | Age: 70
End: 2025-07-03
Payer: MEDICARE

## 2025-07-03 ENCOUNTER — ANESTHESIA (OUTPATIENT)
Dept: SURGERY | Facility: HOSPITAL | Age: 70
End: 2025-07-03
Payer: MEDICARE

## 2025-07-03 VITALS
DIASTOLIC BLOOD PRESSURE: 80 MMHG | BODY MASS INDEX: 23.87 KG/M2 | WEIGHT: 157 LBS | TEMPERATURE: 99 F | HEART RATE: 90 BPM | OXYGEN SATURATION: 99 % | RESPIRATION RATE: 18 BRPM | SYSTOLIC BLOOD PRESSURE: 128 MMHG

## 2025-07-03 DIAGNOSIS — T82.510D PSEUDOANEURYSM OF ARTERIOVENOUS DIALYSIS FISTULA, SUBSEQUENT ENCOUNTER: Primary | ICD-10-CM

## 2025-07-03 DIAGNOSIS — N18.6 ESRD (END STAGE RENAL DISEASE): ICD-10-CM

## 2025-07-03 LAB
ABSOLUTE EOSINOPHIL (OHS): 0.1 K/UL
ABSOLUTE MONOCYTE (OHS): 0.25 K/UL (ref 0.3–1)
ABSOLUTE NEUTROPHIL COUNT (OHS): 1.83 K/UL (ref 1.8–7.7)
ANION GAP (OHS): 15 MMOL/L (ref 8–16)
BASOPHILS # BLD AUTO: 0.03 K/UL
BASOPHILS NFR BLD AUTO: 0.9 %
BUN SERPL-MCNC: 34 MG/DL (ref 8–23)
CALCIUM SERPL-MCNC: 9.3 MG/DL (ref 8.7–10.5)
CHLORIDE SERPL-SCNC: 101 MMOL/L (ref 95–110)
CO2 SERPL-SCNC: 21 MMOL/L (ref 23–29)
CREAT SERPL-MCNC: 8.8 MG/DL (ref 0.5–1.4)
ERYTHROCYTE [DISTWIDTH] IN BLOOD BY AUTOMATED COUNT: 21.1 % (ref 11.5–14.5)
GFR SERPLBLD CREATININE-BSD FMLA CKD-EPI: 6 ML/MIN/1.73/M2
GLUCOSE SERPL-MCNC: 121 MG/DL (ref 70–110)
HCT VFR BLD AUTO: 38.4 % (ref 40–54)
HGB BLD-MCNC: 12.3 GM/DL (ref 14–18)
IMM GRANULOCYTES # BLD AUTO: 0 K/UL (ref 0–0.04)
IMM GRANULOCYTES NFR BLD AUTO: 0 % (ref 0–0.5)
INDIRECT COOMBS: NORMAL
LYMPHOCYTES # BLD AUTO: 1.13 K/UL (ref 1–4.8)
MCH RBC QN AUTO: 23.9 PG (ref 27–31)
MCHC RBC AUTO-ENTMCNC: 32 G/DL (ref 32–36)
MCV RBC AUTO: 75 FL (ref 82–98)
NUCLEATED RBC (/100WBC) (OHS): 0 /100 WBC
PLATELET # BLD AUTO: 145 K/UL (ref 150–450)
PMV BLD AUTO: 9.8 FL (ref 9.2–12.9)
POCT GLUCOSE: 137 MG/DL (ref 70–110)
POTASSIUM SERPL-SCNC: 5.4 MMOL/L (ref 3.5–5.1)
RBC # BLD AUTO: 5.15 M/UL (ref 4.6–6.2)
RELATIVE EOSINOPHIL (OHS): 3 %
RELATIVE LYMPHOCYTE (OHS): 33.8 % (ref 18–48)
RELATIVE MONOCYTE (OHS): 7.5 % (ref 4–15)
RELATIVE NEUTROPHIL (OHS): 54.8 % (ref 38–73)
RH BLD: NORMAL
SODIUM SERPL-SCNC: 137 MMOL/L (ref 136–145)
SPECIMEN OUTDATE: NORMAL
WBC # BLD AUTO: 3.34 K/UL (ref 3.9–12.7)

## 2025-07-03 PROCEDURE — 63600175 PHARM REV CODE 636 W HCPCS: Mod: HCNC | Performed by: SURGERY

## 2025-07-03 PROCEDURE — 80048 BASIC METABOLIC PNL TOTAL CA: CPT | Mod: HCNC

## 2025-07-03 PROCEDURE — 37000008 HC ANESTHESIA 1ST 15 MINUTES: Mod: HCNC | Performed by: SURGERY

## 2025-07-03 PROCEDURE — 36000707: Mod: HCNC | Performed by: SURGERY

## 2025-07-03 PROCEDURE — 25000003 PHARM REV CODE 250: Mod: HCNC | Performed by: NURSE ANESTHETIST, CERTIFIED REGISTERED

## 2025-07-03 PROCEDURE — 63600175 PHARM REV CODE 636 W HCPCS: Mod: HCNC | Performed by: STUDENT IN AN ORGANIZED HEALTH CARE EDUCATION/TRAINING PROGRAM

## 2025-07-03 PROCEDURE — 71000015 HC POSTOP RECOV 1ST HR: Mod: HCNC | Performed by: SURGERY

## 2025-07-03 PROCEDURE — 86850 RBC ANTIBODY SCREEN: CPT | Mod: HCNC | Performed by: SURGERY

## 2025-07-03 PROCEDURE — 71000016 HC POSTOP RECOV ADDL HR: Mod: HCNC | Performed by: SURGERY

## 2025-07-03 PROCEDURE — 63600175 PHARM REV CODE 636 W HCPCS: Mod: HCNC

## 2025-07-03 PROCEDURE — 36000706: Mod: HCNC | Performed by: SURGERY

## 2025-07-03 PROCEDURE — 37000009 HC ANESTHESIA EA ADD 15 MINS: Mod: HCNC | Performed by: SURGERY

## 2025-07-03 PROCEDURE — 27201423 OPTIME MED/SURG SUP & DEVICES STERILE SUPPLY: Mod: HCNC | Performed by: SURGERY

## 2025-07-03 PROCEDURE — 85025 COMPLETE CBC W/AUTO DIFF WBC: CPT | Mod: HCNC | Performed by: SURGERY

## 2025-07-03 PROCEDURE — 71000033 HC RECOVERY, INTIAL HOUR: Mod: HCNC | Performed by: SURGERY

## 2025-07-03 PROCEDURE — 27200704 HC ULTRASOUND NDL/ECHOSTIM: Mod: HCNC | Performed by: STUDENT IN AN ORGANIZED HEALTH CARE EDUCATION/TRAINING PROGRAM

## 2025-07-03 PROCEDURE — 63600175 PHARM REV CODE 636 W HCPCS: Mod: HCNC | Performed by: NURSE ANESTHETIST, CERTIFIED REGISTERED

## 2025-07-03 PROCEDURE — 36832 AV FISTULA REVISION OPEN: CPT | Mod: HCNC,,, | Performed by: SURGERY

## 2025-07-03 PROCEDURE — 36832 AV FISTULA REVISION OPEN: CPT | Mod: AS,HCNC,,

## 2025-07-03 RX ORDER — VANCOMYCIN HYDROCHLORIDE 1 G/20ML
INJECTION, POWDER, LYOPHILIZED, FOR SOLUTION INTRAVENOUS
Status: DISCONTINUED | OUTPATIENT
Start: 2025-07-03 | End: 2025-07-03 | Stop reason: HOSPADM

## 2025-07-03 RX ORDER — OXYCODONE AND ACETAMINOPHEN 5; 325 MG/1; MG/1
1 TABLET ORAL EVERY 4 HOURS PRN
Qty: 15 TABLET | Refills: 0 | Status: SHIPPED | OUTPATIENT
Start: 2025-07-03

## 2025-07-03 RX ORDER — CLINDAMYCIN PHOSPHATE 900 MG/50ML
900 INJECTION, SOLUTION INTRAVENOUS
Status: COMPLETED | OUTPATIENT
Start: 2025-07-03 | End: 2025-07-03

## 2025-07-03 RX ORDER — VASOPRESSIN 20 [USP'U]/ML
INJECTION, SOLUTION INTRAMUSCULAR; SUBCUTANEOUS
Status: DISCONTINUED | OUTPATIENT
Start: 2025-07-03 | End: 2025-07-03

## 2025-07-03 RX ORDER — PROPOFOL 10 MG/ML
VIAL (ML) INTRAVENOUS CONTINUOUS PRN
Status: DISCONTINUED | OUTPATIENT
Start: 2025-07-03 | End: 2025-07-03

## 2025-07-03 RX ORDER — HEPARIN SODIUM 1000 [USP'U]/ML
INJECTION, SOLUTION INTRAVENOUS; SUBCUTANEOUS
Status: DISCONTINUED | OUTPATIENT
Start: 2025-07-03 | End: 2025-07-03 | Stop reason: HOSPADM

## 2025-07-03 RX ORDER — EPHEDRINE SULFATE 50 MG/ML
INJECTION, SOLUTION INTRAVENOUS
Status: DISCONTINUED | OUTPATIENT
Start: 2025-07-03 | End: 2025-07-03

## 2025-07-03 RX ORDER — FENTANYL CITRATE 50 UG/ML
INJECTION, SOLUTION INTRAMUSCULAR; INTRAVENOUS
Status: DISCONTINUED | OUTPATIENT
Start: 2025-07-03 | End: 2025-07-03

## 2025-07-03 RX ORDER — PHENYLEPHRINE HYDROCHLORIDE 10 MG/ML
INJECTION INTRAVENOUS
Status: DISCONTINUED | OUTPATIENT
Start: 2025-07-03 | End: 2025-07-03

## 2025-07-03 RX ADMIN — FENTANYL CITRATE 25 MCG: 50 INJECTION, SOLUTION INTRAMUSCULAR; INTRAVENOUS at 09:07

## 2025-07-03 RX ADMIN — CLINDAMYCIN IN 5 PERCENT DEXTROSE 900 MG: 18 INJECTION, SOLUTION INTRAVENOUS at 09:07

## 2025-07-03 RX ADMIN — PHENYLEPHRINE HYDROCHLORIDE 100 MCG: 10 INJECTION INTRAVENOUS at 09:07

## 2025-07-03 RX ADMIN — PHENYLEPHRINE HYDROCHLORIDE 150 MCG: 10 INJECTION INTRAVENOUS at 09:07

## 2025-07-03 RX ADMIN — EPHEDRINE SULFATE 10 MG: 50 INJECTION INTRAVENOUS at 09:07

## 2025-07-03 RX ADMIN — VASOPRESSIN 1 UNITS: 20 INJECTION, SOLUTION INTRAMUSCULAR; SUBCUTANEOUS at 09:07

## 2025-07-03 RX ADMIN — EPHEDRINE SULFATE 15 MG: 50 INJECTION INTRAVENOUS at 09:07

## 2025-07-03 RX ADMIN — PROPOFOL 25 MCG/KG/MIN: 10 INJECTION, EMULSION INTRAVENOUS at 09:07

## 2025-07-03 RX ADMIN — PHENYLEPHRINE HYDROCHLORIDE 200 MCG: 10 INJECTION INTRAVENOUS at 09:07

## 2025-07-03 RX ADMIN — SODIUM CHLORIDE: 0.9 INJECTION, SOLUTION INTRAVENOUS at 09:07

## 2025-07-03 RX ADMIN — EPHEDRINE SULFATE 5 MG: 50 INJECTION INTRAVENOUS at 09:07

## 2025-07-03 RX ADMIN — MEPIVACAINE HYDROCHLORIDE 20 ML: 15 INJECTION, SOLUTION EPIDURAL; INFILTRATION at 09:07

## 2025-07-03 NOTE — ANESTHESIA PROCEDURE NOTES
Peripheral Block    Patient location during procedure: OR    Reason for block: primary anesthetic    Diagnosis: ESRD   Start time: 7/3/2025 9:06 AM  Timeout: 7/3/2025 9:04 AM   End time: 7/3/2025 9:10 AM    Staffing  Authorizing Provider: Brock Sy MD  Performing Provider: Brock Sy MD    Staffing  Performed by: Brock Sy MD  Authorized by: Brock Sy MD    Preanesthetic Checklist  Completed: patient identified, IV checked, site marked, risks and benefits discussed, surgical consent, monitors and equipment checked, pre-op evaluation and timeout performed  Peripheral Block  Patient position: supine  Prep: ChloraPrep  Patient monitoring: heart rate, cardiac monitor, continuous pulse ox, continuous capnometry and frequent blood pressure checks  Block type: supraclavicular  Laterality: left  Injection technique: single shot  Needle  Needle type: Echogenic   Needle gauge: 20 G  Needle length: 2 in  Needle localization: anatomical landmarks and ultrasound guidance   -ultrasound image captured on disc.  Assessment  Injection assessment: negative aspiration, negative parasthesia and local visualized surrounding nerve  Paresthesia pain: none  Heart rate change: no  Slow fractionated injection: yes    Medications:    Medications: mepivacaine (CARBOCAINE) injection 15 mg/mL (1.5%) - Perineural   20 mL - 7/3/2025 9:10:00 AM    Additional Notes  Supraclavicular nerve block performed with mepivacaine 1.5% 20ml with epinephrine  Intercostal brachial field block performed with mepivacaine 1.5% 10ml for additional coverage.    VSS.  See anesthesia record.  Patient tolerated procedure well.

## 2025-07-03 NOTE — OR NURSING
In preparation for discharge, d/c'd pt's saline lock, applied pressure to site, secured gauze and coban, no redness or swelling noted. Instructions given to patient and wife.Reviewed all new meds, continued medications, follow up appointments and signs and symptoms to report to PCP or seek emergency medical care. Pt verbalized understanding to all instructions and education. Pt denies any complaints or concerns at this time. Pt was transported off the unit to car for discharge.

## 2025-07-03 NOTE — INTERVAL H&P NOTE
The patient has been examined and the H&P has been reviewed:    I concur with the findings and no changes have occurred since H&P was written.    - Plan for LUE plication with Dr. Lim     Surgery risks, benefits and alternative options discussed and understood by patient/family.          There are no hospital problems to display for this patient.

## 2025-07-03 NOTE — TELEPHONE ENCOUNTER
Left message on answering machine to return call to clinic. Need more detail on request for top mattress. Unable to send message via portal due to patient not on the portal.

## 2025-07-03 NOTE — OP NOTE
[facility] - Surgery  Operative Note     Surgery Date: 7/3/2025      Surgeons and Role:     * Dipak Lim MD - Primary     Assisting Surgeon:   JET Grimm ST     Due to unavailability of any adequately trained resident, Germaine Tong PA-C was present and assisted with the AVF plication including the anastomosis and closure.      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  The patient was taken to the operating room placed supine on the operating room table.  The patient was prepped and draped in sterile fashion a time-out performed.  Regional block was tested with Adson to confirm adequacy.  Next a marker was used to jasmin the area of ulceration an elliptical fashion.  Scalpel was used to incise the skin this was deepened with electrocautery and Metzenbaum scissors.  The fistula was freed from surrounding tissue.  An Allis clamp was placed to the ulcerated skin and the fistula was pulled upwards.  It was noted to be dilated and pseudoaneurysmal.  A Patience clamp was used to clamp the fistula confirming appropriate thrill.  Greater than 6 mm of lumen was present below the clamp.  11 blade was used to excise the fistula and skin.  2 layer closure was performed with 5 0 Prolene with horizontal mattress followed by whip stitch.  An air knot was noted for which a 2nd 2 layer closure was performed.  Clamp was released and hemostasis was confirmed.  Electrocautery was used to obtain hemostasis the surrounding tissue.  Surgicel was also  used.  Dermal layer was closed with 3-0 running Vicryl and skin was closed with 4-0 running Monocryl.  Dermabond was used to the skin and a sterile dressing was applied.  The patient was transferred to recovery room in stable condition.       Anesthesia: General/Regional     Operative Findings: ulcerated skin resected and AVF plicated, +thrill at conclusion of case, no tourniquet required.     Estimated Blood Loss: * No values recorded between 7/3/2025  8:55 AM and 7/3/2025 10:55 AM *         Specimens:   Specimen (24h ago, onward)        None                * No specimens in log *

## 2025-07-03 NOTE — BRIEF OP NOTE
[facility] - Surgery  Brief Operative Note    Surgery Date: 7/3/2025     Surgeons and Role:     * Dipak Lim MD - Primary    Assisting Surgeon:   JET Grimm ST    Due to unavailability of any adequately trained resident, Germaine Tong PA-C was present and assisted with the AVF plication including the anastomosis and closure.       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    Estimated Blood Loss: * No values recorded between 7/3/2025  8:55 AM and 7/3/2025 10:55 AM *         Specimens:   Specimen (24h ago, onward)      None            * No specimens in log *        Discharge Note    OUTCOME: Patient tolerated treatment/procedure well without complication and is now ready for discharge.    DISPOSITION: Home or Self Care    FINAL DIAGNOSIS:  <principal problem not specified>    FOLLOWUP: In clinic    DISCHARGE INSTRUCTIONS:  No discharge procedures on file.     Clinical Reference Documents Added to Patient Instructions         Document    ARTERIOVENOUS FISTULA FOR DIALYSIS DISCHARGE INSTRUCTIONS (ENGLISH)

## 2025-07-03 NOTE — TELEPHONE ENCOUNTER
Copied from CRM #[id number]. Topic: General Inquiry - Patient Advice  >> Jun 30, 2025 10:26 AM Vilma wrote:  Type: Patient Call Back    Who called: Self     What is the request in detail: pt needs orders for a top mattress . Please contact to advise.     Can the clinic reply by [facility]?    Would the patient rather a call back or a response via [facility]? Call back     Best call back number:  [phone]      Additional Information:

## 2025-07-03 NOTE — PLAN OF CARE
Pre-op plan of care reviewed with patient and significant other. Admit assessment complete. Questions encouraged and answered. Post-op education begun with pt. Pt ready to proceed.

## 2025-07-03 NOTE — TRANSFER OF CARE
Anesthesia Transfer of Care Note    Patient: Miguel Moore    Procedure(s) Performed: Procedure(s) (LRB):  CREATION, AV FISTULA (Left)  REVISION OR REPAIR,AV FISTULA (Left)    Patient location: PACU    Anesthesia Type: MAC    Transport from OR: Transported from OR on room air with adequate spontaneous ventilation    Post pain: adequate analgesia    Post assessment: no apparent anesthetic complications and tolerated procedure well    Post vital signs: stable    Level of consciousness: awake    Nausea/Vomiting: no nausea/vomiting    Complications: none    Transfer of care protocol was followed      Last vitals: Visit Vitals  /63   Pulse 92   Temp 36.6 °C (97.8 °F)   Resp (!) 22   Wt 71.2 kg (157 lb)   SpO2 100%   BMI 23.87 kg/m²

## 2025-07-03 NOTE — ANESTHESIA PREPROCEDURE EVALUATION
[facility]  Anesthesia Pre-Operative Evaluation         Patient Name: Miguel Moore  YOB: 1955  MRN: [id number]    SUBJECTIVE:     Pre-operative evaluation for Procedure(s) (LRB):  CREATION, AV FISTULA (Left)     07/03/2025    Miguel Moore is a 69 y.o. male w/ a significant PMHx of HTN, HLD, T2DM, CVA with right spastic hemiparesis, seizures, ESRD on HD MWF, afib on Eliquis, PAD.    Patient now presents for the above procedure(s).      LDA: None documented.    Prev airway: None documented.    Drips: None documented.    Problem List[1]    Review of patient's allergies indicates:   Allergen Reactions    Ace inhibitors      Hyperkalemia 8/2018  Other reaction(s): Unknown    Penicillins Hives     Tolerated cefepime and cefdinir previously    Tizanidine      Felt hot       Current Outpatient Medications:  Current Medications[2]    Past Surgical History:   Procedure Laterality Date    CATARACT EXTRACTION W/  INTRAOCULAR LENS IMPLANT Right 10/05/2017    Dr. Tay    CATARACT EXTRACTION W/  INTRAOCULAR LENS IMPLANT Left 10/19/2017    Dr. Tay    CYSTOSCOPY W/ RETROGRADES Bilateral 2/18/2021    Procedure: CYSTOSCOPY, WITH RETROGRADE PYELOGRAM;  Surgeon: JEMMA Styles MD;  Location: [facility];  Service: Urology;  Laterality: Bilateral;  REQUESTING EARLY AS POSSIBE-LO / 2/8/2021 @ 1:13PM  RN Pre Op 2-11-21, Covid NEGATIVE ON  2-17-21.  C A    ESOPHAGOGASTRODUODENOSCOPY N/A 8/17/2020    Procedure: EGD (ESOPHAGOGASTRODUODENOSCOPY);  Surgeon: Desmond Chapman MD;  Location: [facility] ENDO;  Service: Endoscopy;  Laterality: N/A;    ESOPHAGOGASTRODUODENOSCOPY N/A 8/21/2024    Procedure: EGD (ESOPHAGOGASTRODUODENOSCOPY);  Surgeon: Tonie Chauhan MD;  Location: [facility] ENDO;  Service: Endoscopy;  Laterality: N/A;    ESOPHAGOGASTRODUODENOSCOPY N/A 12/5/2024    Procedure: EGD (ESOPHAGOGASTRODUODENOSCOPY);  Surgeon: Tonie Chauhan MD;  Location: [facility];  Service: Endoscopy;   Laterality: N/A;  [place], standard prep, mailed , Eliquis, Dialysis LAB, ASam  ok to hold Eliquis 2 days per Dr Raymond-GT  11/8/24- pc complete. DBM  11/13 pt r/s, Eliquis hold x 2 days per Dr. Raymond see t/e 10/29/24, Dialysis MWF, K+ labs in AM, mailed -ml  11/27 precall com    EYE SURGERY Bilateral     cataract    FEMORAL ARTERY STENT      FRACTURE SURGERY      INCISION AND DRAINAGE, LOWER EXTREMITY Right 4/4/2024    Procedure: INCISION AND DRAINAGE, LOWER EXTREMITY;  Surgeon: Jimbo Montilla III, MD;  Location: [facility] OR;  Service: Orthopedics;  Laterality: Right;    INCISION AND DRAINAGE, LOWER EXTREMITY Right 4/6/2024    Procedure: INCISION AND DRAINAGE, LOWER EXTREMITY;  Surgeon: Desmond Barillas MD;  Location: [facility] OR;  Service: Orthopedics;  Laterality: Right;  foot and ankle    INCISION AND DRAINAGE, LOWER EXTREMITY Right 4/9/2024    Procedure: INCISION AND DRAINAGE, LOWER EXTREMITY- FOOT & ANKLE;  Surgeon: Jimbo Montilla III, MD;  Location: [facility] OR;  Service: Orthopedics;  Laterality: Right;  CULTURES, VASCHE    INCISION AND DRAINAGE, LOWER EXTREMITY Right 5/21/2024    Procedure: INCISION AND DRAINAGE, LOWER EXTREMITY;  Surgeon: Jimbo Montilla III, MD;  Location: [facility] OR;  Service: Orthopedics;  Laterality: Right;  Pulsavac and Vashe    KNEE SURGERY      bilateral scope    TOTAL REPLACEMENT OF HIP JOINT USING COMPUTER-ASSISTED NAVIGATION Right 2/17/2025    Procedure: ARTHROPLASTY, HIP, TOTAL, USING COMPUTER-ASSISTED NAVIGATION;  Surgeon: Chris Quesada MD;  Location: [facility] OR;  Service: Orthopedics;  Laterality: Right;  [place]. ADD ON TO FOLLOW MY CASES    WOUND DRESSING Right 4/9/2024    Procedure: WOUND VAC EXCHANGE;  Surgeon: Jimbo Montilla III, MD;  Location: [facility] OR;  Service: Orthopedics;  Laterality: Right;       Social History[3]    OBJECTIVE:     Vital Signs Range (Last 24H):  Temp:  [36.8 °C (98.3 °F)]   Pulse:  [73]   Resp:  [18]   BP: (139)/(76)   SpO2:  [100 %]        Significant Labs:  Lab Results   Component Value Date    WBC 3.34 (L) 07/03/2025    HGB 12.3 (L) 07/03/2025    HCT 38.4 (L) 07/03/2025     (L) 07/03/2025    CHOL 168 11/07/2024    TRIG 53 11/07/2024    HDL 44 11/07/2024    ALT <5 (L) 02/15/2025    AST 12 02/15/2025     07/03/2025    K 5.4 (H) 07/03/2025     07/03/2025    CREATININE 8.8 (H) 07/03/2025    BUN 34 (H) 07/03/2025    CO2 21 (L) 07/03/2025    TSH 2.672 03/20/2024    PSA 10.2 (H) 11/19/2020    INR 1.1 02/15/2025    HGBA1C 5.8 06/09/2025       Diagnostic Studies: No relevant studies.    EKG:   Results for orders placed or performed during the hospital encounter of 02/15/25   EKG 12-lead    Collection Time: 02/15/25  3:59 PM   Result Value Ref Range    QRS Duration 74 ms    [facility] QTC Calculation 448 ms    Narrative    Test Reason : Z01.818,    Vent. Rate : 118 BPM     Atrial Rate : 118 BPM     P-R Int : 112 ms          QRS Dur :  74 ms      QT Int : 320 ms       P-R-T Axes :  52  42 -16 degrees    QTcB Int : 448 ms    Sinus tachycardia  Cannot rule out Anterior infarct ,age undetermined  Abnormal ECG  When compared with ECG of 21-Aug-2024 09:41,  Significant changes have occurred  Confirmed by Elmer Zuniga ([id number]) on 2/18/2025 4:51:01 PM    Referred By: AAAREFERRAL SELF           Confirmed By: Elmer Zuniga       2D ECHO:  TTE:  Results for orders placed or performed during the hospital encounter of 05/10/24   Echo   Result Value Ref Range    BSA 2.08 m2    LVOT stroke volume 79.58 cm3    LVIDd 5.12 3.5 - 6.0 cm    LV Systolic Volume 53.12 mL    LV Systolic Volume Index 27.8 mL/m2    LVIDs 3.56 2.1 - 4.0 cm    LV Diastolic Volume 124.73 mL    LV Diastolic Volume Index 65.30 mL/m2    IVS 1.13 (A) 0.6 - 1.1 cm    LVOT diameter 1.99 cm    LVOT area 3.1 cm2    FS 30 28 - 44 %    Left Ventricle Relative Wall Thickness 0.45 cm    PW 1.16 (A) 0.6 - 1.1 cm    LV mass 227.45 g    LV Mass Index 119 g/m2    MV Peak E Shravan 0.79 m/s    TDI LATERAL 0.12  m/s    TDI SEPTAL 0.10 m/s    E/E' ratio 7.18 m/s    MV Peak A Shravan 0.93 m/s    TR Max Shravan 2.13 m/s    E/A ratio 0.85     IVRT 77.07 msec    E wave deceleration time 152.70 msec    LV SEPTAL E/E' RATIO 7.90 m/s    LV LATERAL E/E' RATIO 6.58 m/s    LVOT peak shravan 1.04 m/s    Left Ventricular Outflow Tract Mean Velocity 0.68 cm/s    Left Ventricular Outflow Tract Mean Gradient 2.19 mmHg    RVDD 3.82 cm    RV S' 20.64 cm/s    TAPSE 2.70 cm    RV/LV Ratio 0.75 cm    LA size 4.12 cm    Left Atrium Minor Axis 5.67 cm    Left Atrium Major Axis 6.52 cm    RA Major Axis 5.67 cm    AV mean gradient 5 mmHg    AV peak gradient 9 mmHg    Ao peak shravan 1.48 m/s    Ao VTI 31.60 cm    LVOT peak VTI 25.60 cm    AV valve area 2.52 cm²    AV Velocity Ratio 0.70     AV index (prosthetic) 0.81     GENO by Velocity Ratio 2.18 cm²    MV mean gradient 2 mmHg    MV peak gradient 5 mmHg    MV stenosis pressure 1/2 time 44.28 ms    MV valve area p 1/2 method 4.97 cm2    MV valve area by continuity eq 3.34 cm2    MV VTI 23.8 cm    Triscuspid Valve Regurgitation Peak Gradient 18 mmHg    PV PEAK VELOCITY 1.01 m/s    PV peak gradient 4 mmHg    Sinus 3.51 cm    STJ 2.66 cm    Ascending aorta 3.09 cm    IVC diameter 1.33 cm    Mean e' 0.11 m/s    ZLVIDS 0.57     ZLVIDD -0.48     GLENN 57.8 mL/m2    LA Vol 110.45 cm3    LA WIDTH 5.2 cm    RA Width 4.1 cm    TV resting pulmonary artery pressure 21 mmHg    RV TB RVSP 5 mmHg    Est. RA pres 3 mmHg    Narrative      Left Ventricle: The left ventricle is normal in size. There is mild   concentric hypertrophy. There is normal systolic function with a visually   estimated ejection fraction of 55 - 60%. Grade I diastolic dysfunction.    Right Ventricle: Normal right ventricular cavity size. Systolic   function is normal.    Left Atrium: Left atrium is mildly dilated.    Aortic Valve: There is mild aortic valve sclerosis.    Pulmonary Artery: The estimated pulmonary artery systolic pressure is   21 mmHg.     IVC/SVC: Normal venous pressure at 3 mmHg.         SONY:  No results found. However, due to the size of the patient record, not all encounters were searched. Please check Results Review for a complete set of results.    ASSESSMENT/PLAN:           Pre-op Assessment    I have reviewed the Patient Summary Reports.     I have reviewed the Nursing Notes. I have reviewed the NPO Status.   I have reviewed the Medications.     Review of Systems  Anesthesia Hx:  No problems with previous Anesthesia                Social:  Non-Smoker, Social Alcohol Use       Hematology/Oncology:                   Hematology Comments: On Eliquis                    Cardiovascular:     Hypertension    Dysrhythmias atrial fibrillation     PVD hyperlipidemia                               Renal/:  Chronic Renal Disease, ESRD                Neurological:   CVA, residual symptoms    Seizures                                Endocrine:  Diabetes, type 2               Physical Exam  General: Well nourished, Cooperative, Alert and Oriented    Airway:  Mallampati: II   Mouth Opening: Normal  TM Distance: Normal  Tongue: Normal  Neck ROM: Normal ROM    Dental:  Partial Dentures    Chest/Lungs:  Normal Respiratory Rate    Heart:  Rate: Normal  Rhythm: Regular Rhythm        Anesthesia Plan  Type of Anesthesia, risks & benefits discussed:    Anesthesia Type: Regional, Gen Natural Airway, Gen ETT  Intra-op Monitoring Plan: Standard ASA Monitors  Post Op Pain Control Plan: multimodal analgesia  Induction:  IV  Informed Consent: Informed consent signed with the Patient and all parties understand the risks and agree with anesthesia plan.  All questions answered. Patient consented to blood products? Yes  ASA Score: 3    Ready For Surgery From Anesthesia Perspective.     .           [1]   Patient Active Problem List  Diagnosis    Benign essential HTN    Dyslipidemia    BPH (benign prostatic hyperplasia) 2/18/21 prostate bx normal    Seizure disorder as sequela of  cerebrovascular accident    Hemiplegia and hemiparesis following cerebral infarction affecting right dominant side    Microcytosis 9/2019 labs c/w AoCD and AoCKD    ESRD (end stage renal disease)    Nuclear sclerosis, left    Cortical cataract of both eyes    DM type 2 without retinopathy    Secondary hyperparathyroidism of renal origin    Vitamin D deficiency    Right sided weakness    Senile nuclear sclerosis    CME (cystoid macular edema), bilateral    Refractive error    History of fungal infection candida parasilosis hospitalization 8/2020    Chronic deep vein thrombosis (DVT) of popliteal vein of right lower extremity 9/21/20 outside [place]; unprovoked; anticoagulation    Debility    Pulmonary nodule 1/2021 4 mm nodule.    Elevated PSA 2/18/21 prostate bx normal    Anemia in chronic kidney disease    History of diabetic ulcer of foot    Pseudophakia    Bilateral posterior capsular opacification    Paroxysmal atrial fibrillation    Peripheral artery disease 5/16/24 LE angio with angioplasty popliteal artery    Abdominal aortic atherosclerosis on CT 4/1/24    Upper GI bleed 8/23/24 coil embolization L gastric artery    Wounds, multiple    Acute blood loss anemia (ABLA)    Closed fracture of right hip    Slow transit constipation    Encounter for annual health examination    Routine medical exam   [2] No current facility-administered medications for this encounter.    Facility-Administered Medications Ordered in Other Encounters:     fentaNYL injection, , Intravenous, PRN, Dipak Arredondo CRNA, 25 mcg at 07/03/25 0913    propofol (DIPRIVAN) 10 mg/mL infusion, , Intravenous, Continuous PRN, Dipak Arredondo CRNA, Last Rate: 25.632 mL/hr at 07/03/25 0915, 60 mcg/kg/min at 07/03/25 0915    sodium chloride 0.9% infusion, , Intravenous, Continuous PRN, Dipak Arredondo CRNA, New Bag at 07/03/25 0900  [3]   Social History  Socioeconomic History    Marital status: Single   Occupational History    Occupation:  disabled - former  - dozers, etc   Tobacco Use    Smoking status: Never    Smokeless tobacco: Never   Vaping Use    Vaping status: Never Used   Substance and Sexual Activity    Alcohol use: No    Drug use: No   Social History Narrative    Lives with daughter     Social Drivers of Health     Financial Resource Strain: Low Risk  (2/16/2025)    Overall Financial Resource Strain (CARDIA)     Difficulty of Paying Living Expenses: Not hard at all   Recent Concern: Financial Resource Strain - Medium Risk (2/15/2025)    Overall Financial Resource Strain (CARDIA)     Difficulty of Paying Living Expenses: Somewhat hard   Food Insecurity: No Food Insecurity (2/16/2025)    Hunger Vital Sign     Worried About Running Out of Food in the Last Year: Never true     Ran Out of Food in the Last Year: Never true   Recent Concern: Food Insecurity - Food Insecurity Present (2/15/2025)    Hunger Vital Sign     Worried About Running Out of Food in the Last Year: Sometimes true     Ran Out of Food in the Last Year: Sometimes true   Transportation Needs: Unmet Transportation Needs (9/10/2024)    PRAPARE - Transportation     Lack of Transportation (Medical): Yes     Lack of Transportation (Non-Medical): No   Physical Activity: Insufficiently Active (9/10/2024)    Exercise Vital Sign     Days of Exercise per Week: 4 days     Minutes of Exercise per Session: 10 min   Stress: No Stress Concern Present (2/15/2025)    [employer] - Occupational Stress Questionnaire     Feeling of Stress : Not at all   Housing Stability: Low Risk  (2/16/2025)    Housing Stability Vital Sign     Unable to Pay for Housing in the Last Year: No     Homeless in the Last Year: No

## 2025-07-04 NOTE — PROGRESS NOTES
Certification of Assistant at Surgery        Surgery Date: 07/03/2025     Participating Surgeons:  Surgeons and Role:     Dipak Lim MD - Primary       Germaine Tong PA-C - Assisting         Procedures:  Procedure(s) (LRB): REVISION OR REPAIR,AV FISTULA (Left) WITH PLICATION       Assistant Surgeon's Certification of Necessity:  I understand that section 1842 (b) (6) (d) of the Social Security Act generally prohibits Medicare Part B reasonable charge payment for the services of assistants at surgery in teaching hospitals when qualified residents are available to furnish such services. I certify that the services for which payment is claimed were medically necessary, and that no qualified resident was available to perform the services. I further understand that these services are subject to post-payment review by the Medicare carrier.        Germaine Tong PA-C  Vascular & Endovascular Surgery

## 2025-07-04 NOTE — ANESTHESIA POSTPROCEDURE EVALUATION
Anesthesia Post Evaluation    Patient: Miguel Moore    Procedure(s) Performed: Procedure(s) (LRB):  CREATION, AV FISTULA (Left)  REVISION OR REPAIR,AV FISTULA (Left)    Final Anesthesia Type: regional      Patient location during evaluation: PACU  Patient participation: Yes- Able to Participate  Level of consciousness: awake and alert and oriented  Post-procedure vital signs: reviewed and stable  Pain management: adequate  Airway patency: patent    PONV status at discharge: No PONV  Anesthetic complications: no      Cardiovascular status: blood pressure returned to baseline and hemodynamically stable  Respiratory status: unassisted, spontaneous ventilation and room air  Hydration status: euvolemic  Follow-up not needed.              Vitals Value Taken Time   /61 07/03/25 11:40   Temp 36.3 °C (97.4 °F) 07/03/25 11:40   Pulse 92 07/03/25 11:40   Resp 18 07/03/25 11:40   SpO2 99 % 07/03/25 11:40         Event Time   Out of Recovery 11:37:00         Pain/Skyler Score: Skyler Score: 10 (7/3/2025 11:40 AM)

## 2025-07-09 ENCOUNTER — OUTPATIENT CASE MANAGEMENT (OUTPATIENT)
Dept: ADMINISTRATIVE | Facility: OTHER | Age: 70
End: 2025-07-09
Payer: MEDICARE

## 2025-07-10 ENCOUNTER — PATIENT MESSAGE (OUTPATIENT)
Dept: ORTHOPEDICS | Facility: CLINIC | Age: 70
End: 2025-07-10
Payer: MEDICARE

## 2025-07-17 ENCOUNTER — OUTPATIENT CASE MANAGEMENT (OUTPATIENT)
Dept: ADMINISTRATIVE | Facility: OTHER | Age: 70
End: 2025-07-17
Payer: MEDICARE

## 2025-07-17 NOTE — LETTER
Miguel Moore  [address] [place] [address] [address] [id number]      Dear Miguel Moore,     Welcome to [facility] Out[facility]. We are here to assist patients with multiple long-term (chronic) conditions who often need more personalized healthcare.    It was a pleasure talking with you today. My name is Maira Jacobs RN. I look forward to working with you as your Care Manager. I will be contacting you by telephone routinely to help coordinate care and resolve issues.    My goal is to help you function at the healthiest and highest level possible. You can contact me directly at [phone].    As an [facility] patient with [employer], some of the services we provide, at no cost to you, include:     Development of an individualized care plan with a Registered Nurse   Connection with a   Assistance from a Community Health Worker  Connection with available resources and services    Coordinate communication among your care team members   Provide coaching and education  Help you understand your doctor's treatment plan  Help you obtain information about your insurance coverage.    All services provided by [facility] Outpatient Care Managers and other care team members are coordinated with and communicated to your primary care team.      As part of your enrollment, you will be receiving education materials and more information about these services in your [facility] account, by phone, or through the mail. If you do not wish to participate or receive information, you can Opt Out by contacting our office at [phone].     [facility] Patient Rights and Responsibilities available upon request.    Sincerely,      Maira Jacobs RN  [facility]   Outpatient Care Management

## 2025-07-18 ENCOUNTER — PATIENT OUTREACH (OUTPATIENT)
Dept: ADMINISTRATIVE | Facility: OTHER | Age: 70
End: 2025-07-18
Payer: MEDICARE

## 2025-07-18 NOTE — PROGRESS NOTES
This Community Health Worker (CHW) completed Social Determinant of Health (SDOH)  Questionnaire with patient/caregiver via telephone today.  Notified OPCM MEET Rao RN of completion.      Patient requested help with food. Refused Meals On Wheels, but accepted Feeding [place] [employer]. Mailing pt [place]eding [place] guidelines and gave pt number on the phone. [phone]

## 2025-07-21 NOTE — PROGRESS NOTES
Outpatient Care Management  Initial Patient Assessment    Patient: Miguel Moore  MRN: [id number]  Date of Service: 07/17/2025  Completed by: Maira Jacobs RN  Referral Date: 07/03/2025  Date of Eligibility: 7/4/2025  Program:   High Risk  Status: Ongoing  Effective Dates: 7/17/2025 - present  Responsible Staff: Maira Jacobs RN        Reason for Visit   Patient presents with    OPCM RN Second Assessment Attempt    OPCM Chart Review    OPCM Enrollment Call    Plan Of Care       Brief Summary:  Miguel Moore was referred by provider for pseudoaneurysm of arteriovenous dialysis fistula. Patient qualifies for program based on risk score of 61.2%.   Active problem list, medical, surgical and social history reviewed. Active comorbidities include Seizure disorder as squela of CVA,closed fracture pf right hip, benign essential HTN, esrd, DM, and right sided weakness . Areas of need identified by patient include pseudoaneurysm of arteriovenous dialysis fistula, ESRD. Pt voiced that he would like to complete assessment at another time. Callback scheduled with pt.      Disability Status  Is the patient alert and oriented (person, place, time, and situation)?: Alert and oriented x 4  Hearing Difficulty or Deaf: no  Visual Difficulty or Blind: no  Visual and Hearing Conclusion Statement: Pt reports no difficulties at this time.  Difficulty Concentrating, Remembering or Making Decisions: no  Communication Difficulty: no  Eating/Swallowing Difficulty: no  Walking or Climbing Stairs Difficulty: yes  Walking or Climbing Stairs: ambulation difficulty, requires equipment  Dressing/Bathing Difficulty: yes  Dressing/Bathing: bathing difficulty, requires equipment  Grooming: assistance required  Transferring (e.g., getting in and out of chairs): assistive equipment  Difficulty Managing Errands Independently: no  Equipment Currently Used at Home: cane, straight  ADL Conclusion Statement: Pt reports that he requires some  assistance.  Change in Functional Status Since Onset of Current Illness/Injury: no        Spiritual Beliefs  Spiritual, Cultural Beliefs, Restoration Practices, Values that Affect Care: no      Social History     Socioeconomic History    Marital status: Single   Occupational History    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, etc   Tobacco Use    Smoking status: Never    Smokeless tobacco: Never   Vaping Use    Vaping status: Never Used   Substance and Sexual Activity    Alcohol use: No    Drug use: No   Social History Narrative    Lives with daughter     Social Drivers of Health     Financial Resource Strain: Medium Risk (7/18/2025)    Overall Financial Resource Strain (CARDIA)     Difficulty of Paying Living Expenses: Somewhat hard   Food Insecurity: Food Insecurity Present (7/18/2025)    Hunger Vital Sign     Worried About Running Out of Food in the Last Year: Sometimes true     Ran Out of Food in the Last Year: Sometimes true   Transportation Needs: No Transportation Needs (7/18/2025)    PRAPARE - Transportation     Lack of Transportation (Medical): No     Lack of Transportation (Non-Medical): No   Physical Activity: Insufficiently Active (7/18/2025)    Exercise Vital Sign     Days of Exercise per Week: 3 days     Minutes of Exercise per Session: 20 min   Stress: No Stress Concern Present (2/15/2025)    [employer] [place] [employer] - Occupational Stress Questionnaire     Feeling of Stress : Not at all   Housing Stability: Low Risk  (7/18/2025)    Housing Stability Vital Sign     Unable to Pay for Housing in the Last Year: No     Number of Times Moved in the Last Year: 0     Homeless in the Last Year: No       Roles and Relationships       Advance Directives (For Healthcare)  Advance Directive  (If Adv Dir status is received, view document under Adv Dir in header or Chart Review Media tab): Patient does not have Advance Directive, declines information.        Patient Reported  Insurance  Verified current insurance plan:: [employer]            7/17/2025     4:08 PM 7/17/2025     3:44 PM 9/10/2024     2:10 PM 7/30/2024     2:54 PM 8/1/2023     3:53 PM 1/6/2022    10:31 AM 10/22/2019     3:22 PM   Depression Patient Health Questionnaire   Over the last two weeks how often have you been bothered by little interest or pleasure in doing things Not at all  Not at all Not at all Not at all Not at all  Not at all    Over the last two weeks how often have you been bothered by feeling down, depressed or hopeless Not at all Not at all Not at all Not at all Not at all Not at all  Not at all    PHQ-2 Total Score 0  0 0 0 0 0       Data saved with a previous flowsheet row definition       Learning Assessment       02/17/2025 1722 [facility] - [facility] Surg (2/15/2025 - 2/27/2025)   Created by Kaela Connors LPN - Licensed Yu (Nurse) Status: Complete                 PRIMARY LEARNER     Primary Learner Name:  Miguel Moore [place] - 02/17/2025 1722    Relationship:  Patient [place] - 02/17/2025 1722    Does the primary learner have any barriers to learning?:  No Barriers [place] - 02/17/2025 1722    What is the preferred language of the primary learner?:  English [place] - 02/17/2025 1722    Is an  required?:  No [place] - 02/17/2025 1722    How does the primary learner prefer to learn new concepts?:  Listening [place] - 02/17/2025 1722    How often do you need to have someone help you read instructions, pamphlets, or written material from your doctor or pharmacy?:  Sometimes KS - 02/17/2025 1722        CO-LEARNER #1     No question answered        CO-LEARNER #2     No question answered        SPECIAL TOPICS     No question answered        ANSWERED BY:     -:  Patient [place] - 02/17/2025 1722        Comments         Edit History       Kaela Connors LPN - Licensed Yu (Nurse)   02/17/2025 1722

## 2025-07-25 ENCOUNTER — PATIENT OUTREACH (OUTPATIENT)
Dept: ADMINISTRATIVE | Facility: OTHER | Age: 70
End: 2025-07-25
Payer: MEDICARE

## 2025-07-25 NOTE — PROGRESS NOTES
CHW - Case Closure    This Community Health Worker spoke to patient via telephone today.   Pt/Caregiver reported: Following up to see if pt received [place] [place] [employer] guidelines in the mail and if he called to apply. Pt states he did receive [employer] [place] guidelines in the mail and will be calling them today.   Pt/Caregiver denied any additional needs at this time and agrees with episode closure at this time.  Provided patient with Community Health Worker's contact information and encouraged him/her to contact this Community Health Worker if additional needs arise.

## 2025-07-29 ENCOUNTER — OUTPATIENT CASE MANAGEMENT (OUTPATIENT)
Dept: ADMINISTRATIVE | Facility: OTHER | Age: 70
End: 2025-07-29
Payer: MEDICARE

## 2025-07-31 ENCOUNTER — OFFICE VISIT (OUTPATIENT)
Dept: VASCULAR SURGERY | Facility: CLINIC | Age: 70
End: 2025-07-31
Payer: MEDICARE

## 2025-07-31 ENCOUNTER — HOSPITAL ENCOUNTER (OUTPATIENT)
Dept: CARDIOLOGY | Facility: HOSPITAL | Age: 70
Discharge: HOME | End: 2025-07-31
Payer: MEDICARE

## 2025-07-31 VITALS
SYSTOLIC BLOOD PRESSURE: 85 MMHG | DIASTOLIC BLOOD PRESSURE: 40 MMHG | HEIGHT: 68 IN | HEART RATE: 76 BPM | BODY MASS INDEX: 23.79 KG/M2 | WEIGHT: 156.94 LBS

## 2025-07-31 DIAGNOSIS — T82.510D PSEUDOANEURYSM OF ARTERIOVENOUS DIALYSIS FISTULA, SUBSEQUENT ENCOUNTER: ICD-10-CM

## 2025-07-31 DIAGNOSIS — N18.6 ESRD (END STAGE RENAL DISEASE): Primary | ICD-10-CM

## 2025-07-31 DIAGNOSIS — I73.9 PAD (PERIPHERAL ARTERY DISEASE): ICD-10-CM

## 2025-07-31 DIAGNOSIS — I70.235 CRITICAL LIMB ISCHEMIA OF RIGHT LOWER EXTREMITY WITH ULCERATION OF FOOT: ICD-10-CM

## 2025-07-31 LAB
HD FISTULA OUTFLOW VEIN VESSEL: NORMAL
HD INFLOW ARTERY VESSEL: NORMAL
RIGHT DIS GRAFT PSV: 177 CM/ SEC
RIGHT INFLOW PSV: 208 CM/S
RIGHT MID GRAFT PSV: 29 CM/S
RIGHT OUTFLOW VEIN PSV: 93 CM/ SEC
RIGHT PROX ANA PSV: 319 CM/S
RIGHT PROX GRAFT PSV: 205 CM/S
RIGHT VOLUME FLOW DIA: 2.2 CM
RIGHT VOLUME FLOW PSV: 2416 ML/MIN

## 2025-07-31 PROCEDURE — 93990 DOPPLER FLOW TESTING: CPT | Mod: TC,HCNC

## 2025-07-31 PROCEDURE — 99999 PR PBB SHADOW E&M-EST. PATIENT-LVL III: CPT | Mod: PBBFAC,HCNC,,

## 2025-07-31 NOTE — PROGRESS NOTES
[facility]   CLINIC NOTE    07/31/2025    PATIENT ID: Miguel Moore is a 69 y.o. male.    I. HISTORY     HPI:  69 y.o. male with ESRD on HD, hx of DVT, PAD w/ hx of CLTI of RLE (w/ hx of intervention detailed below), who was referred by his dialysis center d/t concerns with HD. Patient has LUE AVF.      HD schedule: MWF 9AM at [facility]   Nephrologist: Dr. Patel ([facility])   Relevant PMHx: CVA w/ residual r-sided hemiplegia, Afibm chronic DVT   Medication(s): Eliquis   Tobacco use: Denies       Previous vascular interventions:   5/2024 (Dr. Lim): RLE angiogram with L fem access. PTA of pop artery with 5x60 ultraverse      12/2024 (Dr. Lim): LUE fistulagram with radial access. PTA of prox fistula 6x40, cephalic arch 6x40 and 7x80, subclav vein 7x80 (ultraverse balloons)    7/2025 (Dr. Lim): LUE AVF plication       6/10/2025: Here for follow up. Has not been seen since 12/2024 fistulagram procedure. No problems with dialysis. No pain in BLE. Wound at RLE healing.     7/31/2025: s/p LUE AVF plication with Dr. Lim 7/3/2025. No issues with HD. RLE wound healing w/o concern.       Past Medical History:   Diagnosis Date    Allergy     Clotting disorder     Eliquis and Plavix    Deep vein thrombosis     Diabetes mellitus, type 2     Encounter for annual health examination 5/6/2025    Hypertension     Nuclear sclerosis of both eyes 06/09/2017    Renal disorder     Seizures     Stroke     Urinary tract infection        Past Surgical History:   Procedure Laterality Date    AV FISTULA PLACEMENT Left 7/3/2025    Procedure: CREATION, AV FISTULA;  Surgeon: Dipak Lim MD;  Location: [facility] OR;  Service: Vascular;  Laterality: Left;  RN PHONE PREOP 7/2/25-----BMP, CBC ON ARRIVAL----CONSENT IN AM    CATARACT EXTRACTION W/  INTRAOCULAR LENS IMPLANT Right 10/05/2017    Dr. Tay    CATARACT EXTRACTION W/  INTRAOCULAR LENS IMPLANT Left 10/19/2017    Dr. Tay     CYSTOSCOPY W/ RETROGRADES Bilateral 2/18/2021    Procedure: CYSTOSCOPY, WITH RETROGRADE PYELOGRAM;  Surgeon: JEMMA Styles MD;  Location: [facility] OR;  Service: Urology;  Laterality: Bilateral;  REQUESTING EARLY AS POSSIBE-LO / 2/8/2021 @ 1:13PM  RN Pre Op 2-11-21, Covid NEGATIVE ON  2-17-21.  C A    ESOPHAGOGASTRODUODENOSCOPY N/A 8/17/2020    Procedure: EGD (ESOPHAGOGASTRODUODENOSCOPY);  Surgeon: Desmond Chapman MD;  Location: [facility] ENDO;  Service: Endoscopy;  Laterality: N/A;    ESOPHAGOGASTRODUODENOSCOPY N/A 8/21/2024    Procedure: EGD (ESOPHAGOGASTRODUODENOSCOPY);  Surgeon: Tonie Chauhan MD;  Location: [facility] ENDO;  Service: Endoscopy;  Laterality: N/A;    ESOPHAGOGASTRODUODENOSCOPY N/A 12/5/2024    Procedure: EGD (ESOPHAGOGASTRODUODENOSCOPY);  Surgeon: Tonie Chauhan MD;  Location: [facility] ENDO;  Service: Endoscopy;  Laterality: N/A;  Bren Ovalles, standard prep, mailed , Eliquis, Dialysis LAB, ASam  ok to hold Eliquis 2 days per Dr Raymond-GT  11/8/24- pc complete. DBM  11/13 pt r/s, Eliquis hold x 2 days per Dr. Raymond see t/e 10/29/24, Dialysis MWF, K+ labs in AM, mailed -ml  11/27 precall com    EYE SURGERY Bilateral     cataract    FEMORAL ARTERY STENT      FRACTURE SURGERY      INCISION AND DRAINAGE, LOWER EXTREMITY Right 4/4/2024    Procedure: INCISION AND DRAINAGE, LOWER EXTREMITY;  Surgeon: Jimbo Montilla III, MD;  Location: [facility] OR;  Service: Orthopedics;  Laterality: Right;    INCISION AND DRAINAGE, LOWER EXTREMITY Right 4/6/2024    Procedure: INCISION AND DRAINAGE, LOWER EXTREMITY;  Surgeon: Desmond Barillas MD;  Location: [facility] OR;  Service: Orthopedics;  Laterality: Right;  foot and ankle    INCISION AND DRAINAGE, LOWER EXTREMITY Right 4/9/2024    Procedure: INCISION AND DRAINAGE, LOWER EXTREMITY- FOOT & ANKLE;  Surgeon: Jimbo Montilla III, MD;  Location: [facility] OR;  Service: Orthopedics;  Laterality: Right;  CULTURES, VASCHE    INCISION AND DRAINAGE, LOWER EXTREMITY Right 5/21/2024     Procedure: INCISION AND DRAINAGE, LOWER EXTREMITY;  Surgeon: Jimbo Montilla III, MD;  Location: [facility] OR;  Service: Orthopedics;  Laterality: Right;  Pulsavac and Vashe    KNEE SURGERY      bilateral scope    REVISION OR REPAIR, AV FISTULA Left 7/3/2025    Procedure: REVISION OR REPAIR,AV FISTULA;  Surgeon: Dipak Lim MD;  Location: [facility] OR;  Service: Vascular;  Laterality: Left;    TOTAL REPLACEMENT OF HIP JOINT USING COMPUTER-ASSISTED NAVIGATION Right 2/17/2025    Procedure: ARTHROPLASTY, HIP, TOTAL, USING COMPUTER-ASSISTED NAVIGATION;  Surgeon: Chris Quesada MD;  Location: [facility] OR;  Service: Orthopedics;  Laterality: Right;  Sofya. ADD ON TO FOLLOW MY CASES    WOUND DRESSING Right 4/9/2024    Procedure: WOUND VAC EXCHANGE;  Surgeon: Jimbo Montilla III, MD;  Location: [facility] OR;  Service: Orthopedics;  Laterality: Right;       Current Outpatient Medications   Medication Sig    apixaban (ELIQUIS) 5 mg Tab Take 1 tablet (5 mg total) by mouth 2 (two) times daily.    aspirin 81 MG Chew Take 81 mg by mouth once daily.    atorvastatin (LIPITOR) 80 MG tablet Take 1 tablet (80 mg total) by mouth once daily.    doxycycline (VIBRAMYCIN) 100 MG Cap Take 1 capsule (100 mg total) by mouth every 12 (twelve) hours.    ezetimibe (ZETIA) 10 mg tablet Take 1 tablet (10 mg total) by mouth once daily. Continue taking Atorvastatin    finasteride (PROSCAR) 5 mg tablet Take 1 tablet (5 mg total) by mouth once daily.    HYDROcodone-acetaminophen (NORCO) 5-325 mg per tablet Take 1 tablet by mouth every 8 (eight) hours as needed for Pain.    oxyCODONE-acetaminophen (PERCOCET) 5-325 mg per tablet Take 1 tablet by mouth every 4 (four) hours as needed for Pain.    pantoprazole (PROTONIX) 40 MG tablet Take 1 tablet (40 mg total) by mouth 2 (two) times daily.    RENVELA 800 mg Tab Take 1 tablet (800 mg total) by mouth 3 (three) times daily with meals.    tamsulosin (FLOMAX) 0.4 mg Cap Take 2 capsules (0.8 mg total) by mouth  "once daily.    tobramycin sulfate 0.3% (TOBREX) 0.3 % ophthalmic solution Place 1 drop into both eyes 2 (two) times a day.    vitamin renal formula, B-complex-vitamin c-folic acid, (NEPHROCAP) 1 mg Cap Take 1 capsule by mouth once daily.     No current facility-administered medications for this visit.     Current medications reviewed.    Review of patient's allergies indicates:   Allergen Reactions    Ace inhibitors      Hyperkalemia 8/2018  Other reaction(s): Unknown    Penicillins Hives     Tolerated cefepime and cefdinir previously    Tizanidine      Felt hot       REVIEW OF SYMPTOMS:  Pertinent items are noted in HPI.      II. PHYSICAL EXAM      Pulse: 76     Body mass index is 23.87 kg/m².    General: Normal appearance. No acute distress. Wheelchair.          Chest: Effort normal. No respiratory distress.     Heart: Regular rate and rhythm.      Neuro: Alert and oriented x3. Cooperative. Mentation at baseline.      MSK: Hemiplegia- R side  RLE wound     ACCESS EXAM: Left Brachial cephalic vein AVF    Dilated, aneurysmal. Thinned skin but no scab/tissue loss atop aneurysmal portion. Small wound, almost healed proximal upper arm  Strong thrill throughout  Very minimal pulsatility       III. LABS & IMAGING     LAB RESULTS:  No results found for: "CBC"  Lab Results   Component Value Date    LABPROT 11.8 02/15/2025    INR 1.1 02/15/2025     Lab Results   Component Value Date     07/03/2025    K 5.4 (H) 07/03/2025     07/03/2025    CO2 21 (L) 07/03/2025     (H) 07/03/2025    BUN 34 (H) 07/03/2025    CREATININE 8.8 (H) 07/03/2025    CALCIUM 9.3 07/03/2025    ANIONGAP 15 07/03/2025    EGFRNONAA 11 (A) 02/11/2021     Lab Results   Component Value Date    WBC 3.34 (L) 07/03/2025    RBC 5.15 07/03/2025    HGB 12 (L) 07/28/2025    HCT 38.4 (L) 07/03/2025    MCV 75 (L) 07/03/2025    MCH 23.9 (L) 07/03/2025    MCHC 32.0 07/03/2025    RDW 21.1 (H) 07/03/2025     (L) 07/03/2025    MPV 9.8 07/03/2025 "    GRAN 3.1 02/26/2025    GRAN 55.9 02/26/2025    LYMPH 33.8 07/03/2025    LYMPH 1.13 07/03/2025    MONO 7.5 07/03/2025    MONO 0.25 (L) 07/03/2025    EOS 3.0 07/03/2025    EOS 0.10 07/03/2025    BASO 0.02 02/26/2025    EOSINOPHIL 5.3 02/26/2025    BASOPHIL 0.9 07/03/2025    BASOPHIL 0.03 07/03/2025    DIFFMETHOD Automated 02/26/2025     Lab Results   Component Value Date    HGBA1C 5.8 06/09/2025       IMAGING RESULTS:  (I have personally reviewed the images/studies and provided my interpretation below.)    HD US: 6/4/2025  Findings include no HDS stenosis.   Peak flow: 2799 ml/min     HD US 7/31/2025:   - LUE AVF w/o HDS stenosis.   - Flow volume: 2416 ml/min    IV. ASSESSMENT & PLAN      69 y.o. male with h/o CLTI with RLE wound and ESRD on HD via LUE AVF.    1. ESRD (end stage renal disease)      Pseudoaneurysm of arteriovenous dialysis fistula, subsequent encounter  S/p LUE AVF plication with Dr. Lim 7/3/2025. No issues with HD. HD US w/o hemodynamically significant stenosis.     - Cont HD via LUE AVF. Rec rotating stick sites.   - Cont routine surveillance with HD US      2. Critical limb ischemia of right lower extremity with ulceration of foot   RLE chronic wound healing w/o concerns for perfusion. 5/21/2024 LOBO with mild disease.     - Repeat LOBO/TBI   - Cont wound care      RTC in 3 mo with updated HD US and LOBO/TBI       Germaine Tong PA-C  Vascular & Endovascular Surgery  [facility]      I spent a total of 25 minutes on the day of the visit.This includes face to face time and non-face to face time preparing to see the patient (eg, review of tests), obtaining and/or reviewing separately obtained history, documenting clinical information in the electronic or other health record, independently interpreting results and communicating results to the patient/family/caregiver, or care coordinator.

## 2025-08-05 ENCOUNTER — TELEPHONE (OUTPATIENT)
Dept: PODIATRY | Facility: CLINIC | Age: 70
End: 2025-08-05
Payer: MEDICARE

## 2025-08-05 NOTE — PROGRESS NOTES
Outpatient Care Management  Plan of Care Follow Up Visit    Patient: Miguel Moore  MRN: [id number]  Date of Service: 07/29/2025  Completed by: Maira Jacobs RN  Referral Date: 07/03/2025    Reason for Visit   Patient presents with    Update Plan Of Care       Brief Summary: Pt voiced that he is trying to follow the renal diet and fluid restrictions. Pt voiced that it is harder to following the fluid restrictions during the summer. Pt voiced that he has generalized pain during the day but he tries to remain active.

## 2025-08-25 ENCOUNTER — EXTERNAL HOME HEALTH (OUTPATIENT)
Dept: HOME HEALTH SERVICES | Facility: HOSPITAL | Age: 70
End: 2025-08-25
Payer: MEDICARE

## (undated) DEVICE — CATH INTROCAN SAFETY 20GX1.75

## (undated) DEVICE — SUT MCRYL PLUS 4-0 PS2 27IN

## (undated) DEVICE — BNDG COFLEX FOAM LF2 ST 6X5YD

## (undated) DEVICE — SUT MONOCRYL 4.0 PS2 CP496G

## (undated) DEVICE — CARTRIDGE LENS D

## (undated) DEVICE — DRAPE STERI INSTRUMENT 1018

## (undated) DEVICE — BOWL STERILE LARGE 32OZ

## (undated) DEVICE — Device

## (undated) DEVICE — SYS LABLNG CORECT MED 4 FLG

## (undated) DEVICE — PAD CAST SPECIALIST STRL 6

## (undated) DEVICE — CANISTER SUCTION 2 LTR

## (undated) DEVICE — SOL NACL 0.9% IV INJ 1000ML

## (undated) DEVICE — PULSAVAC ZIMMER

## (undated) DEVICE — ELECTRODE REM PLYHSV RETURN 9

## (undated) DEVICE — BLADE SURG CARBON STEEL SZ11

## (undated) DEVICE — KIT EYE PIC PACK WB

## (undated) DEVICE — SUT LIGACLIP SMALL XTRA

## (undated) DEVICE — KIT CUSTOM BASIC EYE ST / MEA

## (undated) DEVICE — MAT QUICK 40X30 FLOOR FLUID LF

## (undated) DEVICE — ALCOHOL

## (undated) DEVICE — DRESSING AQUACEL AG 3.5X10IN

## (undated) DEVICE — KNIFE ANGLE 1MM

## (undated) DEVICE — GLOVE SENSICARE PI GRN 8

## (undated) DEVICE — KIT DRAPE RIO ONE PIECE W/POCK

## (undated) DEVICE — SEE MEDLINE ITEM 107746

## (undated) DEVICE — SOL NACL 0.9% INJ 1000ML

## (undated) DEVICE — SEE MEDLINE ITEM 154981

## (undated) DEVICE — GLOVE SIGNATURE ESSNTL LTX 7.5

## (undated) DEVICE — SPONGE GAUZE 16PLY 4X4

## (undated) DEVICE — COVER OVERHEAD SURG LT BLUE

## (undated) DEVICE — SPONGE COTTON TRAY 4X4IN

## (undated) DEVICE — CLIP MED TICALL

## (undated) DEVICE — GLOVE SIGNATURE MICRO LTX 6

## (undated) DEVICE — SUT STRATAFIX 1 PDS CT-1

## (undated) DEVICE — NDL ANES SPINAL 18X3.5ST 18G

## (undated) DEVICE — TOWEL OR DISP STRL BLUE 4/PK

## (undated) DEVICE — KIT DRSNG GRNUFM MED 18X12X5CM

## (undated) DEVICE — SOL IRR NACL .9% 3000ML

## (undated) DEVICE — TOURNIQUET SB QC DP 34X4IN

## (undated) DEVICE — KIT CHECKPOINT TIBIAL

## (undated) DEVICE — SOL IRR STRL WATER 500ML

## (undated) DEVICE — CATH COUDE 18F 5CC W/STAT LOC

## (undated) DEVICE — DRAPE U SPLIT SHEET 54X76IN

## (undated) DEVICE — COVER SNAP KAP 26IN

## (undated) DEVICE — NDL HYPO REG 25G X 1 1/2

## (undated) DEVICE — GOWN NONREINF SET-IN SLV XL

## (undated) DEVICE — GLOVE SENSICARE PI MICRO 6

## (undated) DEVICE — DRESSING AQUACEL AG ADV 3.5X12

## (undated) DEVICE — ALCOHOL 70% ANTISEPTIC ISO 4OZ

## (undated) DEVICE — SUPPORT ULNA NERVE PROTECTOR

## (undated) DEVICE — BANDAGE ELAS SOFTWRAP ST 4X5YD

## (undated) DEVICE — SOL BETADINE 5%

## (undated) DEVICE — SEE MEDLINE ITEM 157110

## (undated) DEVICE — BANDAGE ELAS SOFTWRAP ST 6X5YD

## (undated) DEVICE — SHEILD & GARTERS FOX METAL EYE

## (undated) DEVICE — COLLECTOR SPECIMEN ANAEROBIC

## (undated) DEVICE — DRAPE TOP 53X102IN

## (undated) DEVICE — SOL VASHE INSTILLATION WND 16

## (undated) DEVICE — APPLICATOR Q-TIPS BULK 3 INCH

## (undated) DEVICE — GLOVE SURG BIOGEL LATEX SZ 7.5

## (undated) DEVICE — DRESSING GAUZE XEROFORM 5X9

## (undated) DEVICE — VAC WOUND ULTRA THERAPY

## (undated) DEVICE — SYR 10CC LUER LOCK

## (undated) DEVICE — GLOVE BIOGEL ORTHOPEDIC 8

## (undated) DEVICE — BLANKET UPPER BODY 78.7X29.9IN

## (undated) DEVICE — SUT 4-0 12-18IN SILK BLACK

## (undated) DEVICE — SEE MEDLINE ITEM 157181

## (undated) DEVICE — TRAP FLUID SMOKE EVACUATOR

## (undated) DEVICE — SOL 0.9% NACL VIAFLO 1000ML

## (undated) DEVICE — SUT PROLENE 6-0 24 C-1 C-1

## (undated) DEVICE — APPLICATOR CHLORAPREP ORN 26ML

## (undated) DEVICE — SUT 7/0 24IN PROLENE BL MO

## (undated) DEVICE — SUT PROLENE 5-0 24 C-1 BL

## (undated) DEVICE — KIT TRACKING VIZADISC HIP

## (undated) DEVICE — PAD PREP CUFFED NS 24X48IN

## (undated) DEVICE — POSITIONER HEAD DONUT 9IN FOAM

## (undated) DEVICE — SUT SILK 3-0 SH 18IN BLACK

## (undated) DEVICE — TAPE SURG DURAPORE 2 X10YD

## (undated) DEVICE — GLOVE SURGICAL LATEX SZ 7

## (undated) DEVICE — KIT IRR SUCTION HND PIECE

## (undated) DEVICE — KIT PROBE COVER WITH GEL

## (undated) DEVICE — PAD PREP 50/CA

## (undated) DEVICE — SYR IRRIGATION BULB STER 60ML

## (undated) DEVICE — STOCKINET 4INX48

## (undated) DEVICE — SWAB CULTURETTE II DUAL

## (undated) DEVICE — BOOT SUTURE AID

## (undated) DEVICE — GLOVE SENSICARE PI MICRO 7

## (undated) DEVICE — SUT 3-0 12-18IN SILK

## (undated) DEVICE — UNDERGLOVES BIOGEL PI SIZE 8

## (undated) DEVICE — CANISTER INFOV.A.C WOUND 500ML

## (undated) DEVICE — SYR 3CC LUER LOC

## (undated) DEVICE — ADHESIVE DERMABOND MINI HV

## (undated) DEVICE — SEE L#120831

## (undated) DEVICE — SOL NACL IRR 3000ML

## (undated) DEVICE — HOOD T7 W/ PEEL AWAY LENS

## (undated) DEVICE — POSITIONER IV ARMBOARD FOAM

## (undated) DEVICE — SUT 2-0 12-18IN SILK

## (undated) DEVICE — SOL IRR SOD CHL .9% POUR

## (undated) DEVICE — JELLY LUBRICANT STERILE 4 OZ

## (undated) DEVICE — PENCIL SMK EVAC CONNECTOR 10FT

## (undated) DEVICE — GLOVE BIOGEL ECLIPSE SZ 7.5

## (undated) DEVICE — CANISTER SUCTION RIGID 2000CC

## (undated) DEVICE — NDL SPINAL 18GX3.5 SPINOCAN

## (undated) DEVICE — COVER TABLE 44X90 STERILE

## (undated) DEVICE — PADDING CAST SOFT-ROLL 4 X 4

## (undated) DEVICE — LINER GLOVE POWDERFREE SZ 7

## (undated) DEVICE — COVER PROXIMA MAYO STAND

## (undated) DEVICE — PACK ARTHROSCOPY W/ISO BAC

## (undated) DEVICE — DRAPE THREE-QUARTER 53X77IN

## (undated) DEVICE — DRAPE HAND STERILE

## (undated) DEVICE — BLANKET WARMING UPPER BODY

## (undated) DEVICE — SEE MEDLINE ITEM 152487

## (undated) DEVICE — GLOVE SENSICARE PI GRN 7.5

## (undated) DEVICE — CATH IV JELCO 22GAX1

## (undated) DEVICE — SUT VICRYL PLUS 2-0 CT1 18

## (undated) DEVICE — DRAPE SURG W/TWL 17 5/8X23

## (undated) DEVICE — SUT VICRYL 3-0 27 SH

## (undated) DEVICE — GOWN B1 X-LG X-LONG

## (undated) DEVICE — GLOVE SURGICAL LATEX SZ 6.5

## (undated) DEVICE — SOL NACL IRR 1000ML BTL

## (undated) DEVICE — SUT VICRYL CTD 2-0 GI 27 SH

## (undated) DEVICE — SYS SURGIPHOR STRL IRRG

## (undated) DEVICE — BLADE SAGITTAL 18 X 1.27 X 90M

## (undated) DEVICE — LOOP VESSEL BLUE MINI 2/CARD

## (undated) DEVICE — SYR ONLY LUER LOCK 20CC

## (undated) DEVICE — GEL AQUASONIC 100 STERILE20GM

## (undated) DEVICE — SEE MEDLINE ITEM 152622

## (undated) DEVICE — GLOVE SURGICAL LATEX SZ 8

## (undated) DEVICE — SUT ETHIBOND EXCEL 5-0 V-40

## (undated) DEVICE — HEMOSTAT SURGICEL 4X8IN

## (undated) DEVICE — CONTAINER SPECIMEN OR STER 4OZ

## (undated) DEVICE — GLOVE BIOGEL PI MICRO SZ 7

## (undated) DEVICE — DECANTER FLUID TRNSF WHITE 9IN

## (undated) DEVICE — GLOVE SIGNATURE ESSNTL LTX 8

## (undated) DEVICE — BONE PINS (4MM X 170MM)
Type: IMPLANTABLE DEVICE | Site: HIP | Status: NON-FUNCTIONAL
Removed: 2025-02-17

## (undated) DEVICE — CATH URTRL OPEN END STR TIP 5F

## (undated) DEVICE — CONTAINER SPECIMEN STRL 4OZ

## (undated) DEVICE — SYS CLSR DERMABOND PRINEO 22CM

## (undated) DEVICE — DRESSING TRANS 4X4 TEGADERM